# Patient Record
Sex: MALE | Race: WHITE | NOT HISPANIC OR LATINO | Employment: OTHER | URBAN - METROPOLITAN AREA
[De-identification: names, ages, dates, MRNs, and addresses within clinical notes are randomized per-mention and may not be internally consistent; named-entity substitution may affect disease eponyms.]

---

## 2017-02-21 DIAGNOSIS — I10 BENIGN ESSENTIAL HYPERTENSION: ICD-10-CM

## 2017-02-21 RX ORDER — LISINOPRIL AND HYDROCHLOROTHIAZIDE 20; 25 MG/1; MG/1
0.5 TABLET ORAL DAILY
Qty: 15 TABLET | Refills: 0 | Status: SHIPPED | OUTPATIENT
Start: 2017-02-21 | End: 2017-02-23

## 2017-02-21 RX ORDER — SIMVASTATIN 40 MG
20 TABLET ORAL AT BEDTIME
Qty: 15 TABLET | Refills: 0 | Status: SHIPPED | OUTPATIENT
Start: 2017-02-21 | End: 2017-02-23

## 2017-02-21 NOTE — TELEPHONE ENCOUNTER
Lisinopril hctz      Last Written Prescription Date: 1/22/16  Last Fill Quantity: 180, # refills: 0  Last Office Visit with [facility], [facility] or [facility] prescribing provider: 06/28/16       Potassium   Date Value Ref Range Status   01/22/2016 3.6 3.4 - 5.3 mmol/L Final     Creatinine   Date Value Ref Range Status   01/22/2016 0.90 0.66 - 1.25 mg/dL Final     BP Readings from Last 3 Encounters:   06/28/16 132/89   01/22/16 120/80       Simvastatin 40     Last Written Prescription Date: 1/22/16  Last Fill Quantity: 180, # refills: 0  Last Office Visit with [facility], [facility] or [facility] prescribing provider: 06/28/16       Lab Results   Component Value Date    CHOL 150 01/22/2016     Lab Results   Component Value Date    HDL 32 01/22/2016     Lab Results   Component Value Date    LDL 68 01/22/2016     Lab Results   Component Value Date    TRIG 248 01/22/2016     No results found for: CHOLRAMILA      Thank You!  Rowena Aponte  [facility]  P: [phone] F:[phone]

## 2017-02-21 NOTE — TELEPHONE ENCOUNTER
Due for labs and follow up, will refill x 1 and inform pt    Lucas Alvarez, Pharm D  [facility]  [phone]

## 2017-02-22 ENCOUNTER — TELEPHONE (OUTPATIENT)
Dept: FAMILY MEDICINE | Facility: CLINIC | Age: 67
End: 2017-02-22

## 2017-02-22 DIAGNOSIS — E78.5 HYPERLIPIDEMIA LDL GOAL <130: Primary | ICD-10-CM

## 2017-02-22 DIAGNOSIS — I10 BENIGN ESSENTIAL HYPERTENSION: ICD-10-CM

## 2017-02-22 NOTE — TELEPHONE ENCOUNTER
Pt has appointment tomorrow afternoon and he made apt for 8:00 tomorrow morning for fasting labs. He is asking if we can put in his lab orders. His appointment is for follow up on his BP and Cholesterol. He has not had labs since Jan 2016.  (No need to call him back)

## 2017-02-23 ENCOUNTER — OFFICE VISIT (OUTPATIENT)
Dept: FAMILY MEDICINE | Facility: CLINIC | Age: 67
End: 2017-02-23
Payer: MEDICARE

## 2017-02-23 VITALS
SYSTOLIC BLOOD PRESSURE: 127 MMHG | BODY MASS INDEX: 30.29 KG/M2 | HEIGHT: 67 IN | WEIGHT: 193 LBS | TEMPERATURE: 97.7 F | HEART RATE: 86 BPM | DIASTOLIC BLOOD PRESSURE: 87 MMHG

## 2017-02-23 DIAGNOSIS — E78.5 HYPERLIPIDEMIA LDL GOAL <130: ICD-10-CM

## 2017-02-23 DIAGNOSIS — I10 BENIGN ESSENTIAL HYPERTENSION: ICD-10-CM

## 2017-02-23 LAB
ALBUMIN SERPL-MCNC: 3.6 G/DL (ref 3.4–5)
ALP SERPL-CCNC: 68 U/L (ref 40–150)
ALT SERPL W P-5'-P-CCNC: 31 U/L (ref 0–70)
ANION GAP SERPL CALCULATED.3IONS-SCNC: 5 MMOL/L (ref 3–14)
AST SERPL W P-5'-P-CCNC: 19 U/L (ref 0–45)
BASOPHILS # BLD AUTO: 0 10E9/L (ref 0–0.2)
BASOPHILS NFR BLD AUTO: 0.6 %
BILIRUB SERPL-MCNC: 0.4 MG/DL (ref 0.2–1.3)
BUN SERPL-MCNC: 22 MG/DL (ref 7–30)
CALCIUM SERPL-MCNC: 9.4 MG/DL (ref 8.5–10.1)
CHLORIDE SERPL-SCNC: 101 MMOL/L (ref 94–109)
CHOLEST SERPL-MCNC: 152 MG/DL
CO2 SERPL-SCNC: 30 MMOL/L (ref 20–32)
CREAT SERPL-MCNC: 0.86 MG/DL (ref 0.66–1.25)
DIFFERENTIAL METHOD BLD: NORMAL
EOSINOPHIL # BLD AUTO: 0.1 10E9/L (ref 0–0.7)
EOSINOPHIL NFR BLD AUTO: 2.3 %
ERYTHROCYTE [DISTWIDTH] IN BLOOD BY AUTOMATED COUNT: 13.4 % (ref 10–15)
GFR SERPL CREATININE-BSD FRML MDRD: 88 ML/MIN/1.7M2
GLUCOSE SERPL-MCNC: 96 MG/DL (ref 70–99)
HCT VFR BLD AUTO: 42.2 % (ref 40–53)
HDLC SERPL-MCNC: 32 MG/DL
HGB BLD-MCNC: 14.6 G/DL (ref 13.3–17.7)
LDLC SERPL CALC-MCNC: 91 MG/DL
LYMPHOCYTES # BLD AUTO: 2 10E9/L (ref 0.8–5.3)
LYMPHOCYTES NFR BLD AUTO: 37.3 %
MCH RBC QN AUTO: 32.2 PG (ref 26.5–33)
MCHC RBC AUTO-ENTMCNC: 34.6 G/DL (ref 31.5–36.5)
MCV RBC AUTO: 93 FL (ref 78–100)
MONOCYTES # BLD AUTO: 0.5 10E9/L (ref 0–1.3)
MONOCYTES NFR BLD AUTO: 9.4 %
NEUTROPHILS # BLD AUTO: 2.7 10E9/L (ref 1.6–8.3)
NEUTROPHILS NFR BLD AUTO: 50.4 %
NONHDLC SERPL-MCNC: 120 MG/DL
PLATELET # BLD AUTO: 266 10E9/L (ref 150–450)
POTASSIUM SERPL-SCNC: 3.9 MMOL/L (ref 3.4–5.3)
PROT SERPL-MCNC: 7.4 G/DL (ref 6.8–8.8)
RBC # BLD AUTO: 4.54 10E12/L (ref 4.4–5.9)
SODIUM SERPL-SCNC: 136 MMOL/L (ref 133–144)
TRIGL SERPL-MCNC: 146 MG/DL
WBC # BLD AUTO: 5.3 10E9/L (ref 4–11)

## 2017-02-23 PROCEDURE — 36415 COLL VENOUS BLD VENIPUNCTURE: CPT | Performed by: PHYSICIAN ASSISTANT

## 2017-02-23 PROCEDURE — 80061 LIPID PANEL: CPT | Performed by: PHYSICIAN ASSISTANT

## 2017-02-23 PROCEDURE — 99213 OFFICE O/P EST LOW 20 MIN: CPT | Performed by: PHYSICIAN ASSISTANT

## 2017-02-23 PROCEDURE — 85025 COMPLETE CBC W/AUTO DIFF WBC: CPT | Performed by: PHYSICIAN ASSISTANT

## 2017-02-23 PROCEDURE — 80053 COMPREHEN METABOLIC PANEL: CPT | Performed by: PHYSICIAN ASSISTANT

## 2017-02-23 RX ORDER — SIMVASTATIN 40 MG
20 TABLET ORAL AT BEDTIME
Qty: 180 TABLET | Refills: 0 | Status: SHIPPED | OUTPATIENT
Start: 2017-02-23 | End: 2018-03-19

## 2017-02-23 RX ORDER — LISINOPRIL AND HYDROCHLOROTHIAZIDE 20; 25 MG/1; MG/1
0.5 TABLET ORAL DAILY
Qty: 180 TABLET | Refills: 0 | Status: SHIPPED | OUTPATIENT
Start: 2017-02-23 | End: 2018-03-19

## 2017-02-23 ASSESSMENT — ENCOUNTER SYMPTOMS
EYE DISCHARGE: 0
DYSURIA: 0
CHILLS: 0
NAUSEA: 0
EYE REDNESS: 0
HEMOPTYSIS: 0
WEIGHT LOSS: 0
EYE PAIN: 0
MYALGIAS: 0
ABDOMINAL PAIN: 0
DOUBLE VISION: 0
SENSORY CHANGE: 0
SORE THROAT: 0
DIZZINESS: 0
DIAPHORESIS: 0
PSYCHIATRIC NEGATIVE: 1
SPUTUM PRODUCTION: 0
HALLUCINATIONS: 0
CONSTIPATION: 0
DIARRHEA: 0
FREQUENCY: 0
TINGLING: 0
INSOMNIA: 0
HEARTBURN: 0
WEAKNESS: 0
FEVER: 0
NERVOUS/ANXIOUS: 0
LOSS OF CONSCIOUSNESS: 0
NECK PAIN: 0
PHOTOPHOBIA: 0
BACK PAIN: 0
VOMITING: 0
FOCAL WEAKNESS: 0
ORTHOPNEA: 0
SHORTNESS OF BREATH: 0
BLOOD IN STOOL: 0
WHEEZING: 0
SEIZURES: 0
BLURRED VISION: 0
NEUROLOGICAL NEGATIVE: 1
COUGH: 0
HEADACHES: 0
DEPRESSION: 0
PALPITATIONS: 0

## 2017-02-23 ASSESSMENT — LIFESTYLE VARIABLES: SUBSTANCE_ABUSE: 0

## 2017-02-23 NOTE — NURSING NOTE
"Chief Complaint   Patient presents with     Lipids     Hypertension       Initial /87 (BP Location: Right arm, Patient Position: Chair, Cuff Size: Adult Regular)  Pulse 86  Temp 97.7  F (36.5  C) (Tympanic)  Ht 5' 7.25\" (1.708 m)  Wt 193 lb (87.5 kg)  BMI 30 kg/m2 Estimated body mass index is 30 kg/(m^2) as calculated from the following:    Height as of this encounter: 5' 7.25\" (1.708 m).    Weight as of this encounter: 193 lb (87.5 kg).  Medication Reconciliation: complete    Health Maintenance that is potentially due pending provider review:  NONE    Naina ELLIOTT MA        "

## 2017-02-23 NOTE — MR AVS SNAPSHOT
"              After Visit Summary   2017    Duane Johnson    MRN: [id number]           Patient Information     Date Of Birth          1950        Visit Information        Provider Department      2017 1:40 PM Ronald Riley PA-C [facility]        Today's Diagnoses     Benign essential hypertension           Follow-ups after your visit        Follow-up notes from your care team     Return if symptoms worsen or fail to improve.      Who to contact     If you have questions or need follow up information about today's clinic visit or your schedule please contact [facility] directly at [phone].  Normal or non-critical lab and imaging results will be communicated to you by [employer], letter or phone within 4 business days after the clinic has received the results. If you do not hear from us within 7 days, please contact the clinic through [employer] or phone. If you have a critical or abnormal lab result, we will notify you by phone as soon as possible.  Submit refill requests through [facility] or call your pharmacy and they will forward the refill request to us. Please allow 3 business days for your refill to be completed.          Additional Information About Your Visit        MyChart Information     [facility] lets you send messages to your doctor, view your test results, renew your prescriptions, schedule appointments and more. To sign up, go to [email or web address] . Click on \"Log in\" on the left side of the screen, which will take you to the Welcome page. Then click on \"Sign up Now\" on the right side of the page.     You will be asked to enter the access code listed below, as well as some personal information. Please follow the directions to create your username and password.     Your access code is: [email or web address]  Expires: 2017  2:30 PM     Your access code will  in 90 days. If you need help or a new code, please call your [facility] or " "[phone].        Care EveryWhere ID     This is your Care EveryWhere ID. This could be used by other organizations to access your [facility] medical records  [id number]        Your Vitals Were     Pulse Temperature Height BMI (Body Mass Index)          86 97.7  F (36.5  C) (Tympanic) 5' 7.25\" (1.708 m) 30 kg/m2         Blood Pressure from Last 3 Encounters:   02/23/17 127/87   06/28/16 132/89   01/22/16 120/80    Weight from Last 3 Encounters:   02/23/17 193 lb (87.5 kg)   06/28/16 201 lb (91.2 kg)   01/22/16 211 lb (95.7 kg)              Today, you had the following     No orders found for display         Where to get your medicines      These medications were sent to [facility] [address] [address]     Phone:  [phone]     lisinopril-hydrochlorothiazide 20-25 MG per tablet    simvastatin 40 MG tablet          Primary Care Provider    None Specified       No primary provider on file.        Thank you!     Thank you for choosing [facility]  for your care. Our goal is always to provide you with excellent care. Hearing back from our patients is one way we can continue to improve our services. Please take a few minutes to complete the written survey that you may receive in the mail after your visit with us. Thank you!             Your Updated Medication List - Protect others around you: Learn how to safely use, store and throw away your medicines at www.disposemymeds.org.          This list is accurate as of: 2/23/17  2:30 PM.  Always use your most recent med list.                   Brand Name Dispense Instructions for use    lisinopril-hydrochlorothiazide 20-25 MG per tablet    PRINZIDE/ZESTORETIC    180 tablet    Take 0.5 tablets by mouth daily       simvastatin 40 MG tablet    ZOCOR    180 tablet    Take 0.5 tablets (20 mg) by mouth At Bedtime         "

## 2017-02-23 NOTE — PROGRESS NOTES
HPI    SUBJECTIVE:                                                    Duane Johnson is a 67 year old male who presents to clinic today for medication follow-up. He is taking mediation as scheduled with no side effects. He reports home blood pressures have been in normal range although he cant remember exact numbers. He endorses healthy diet and currently takes fish oils twice daily.       Hyperlipidemia Follow-Up      Rate your low fat/cholesterol diet?: good    Taking statin?  Yes, no muscle aches from statin    Other lipid medications/supplements?:  none     Hypertension Follow-up      Outpatient blood pressures are being checked at home.  Results are, can't remember them.    Low Salt Diet: no added salt       Amount of exercise or physical activity: 2-3 days/week for an average of 15-30 minutes    Problems taking medications regularly: No    Medication side effects: none  Diet: regular (no restrictions)      Problem list and histories reviewed & adjusted, as indicated.  Additional history: as documented    Patient Active Problem List   Diagnosis     Hyperlipidemia LDL goal <130     Benign essential hypertension     History reviewed. No pertinent past surgical history.    Social History   Substance Use Topics     Smoking status: Current Every Day Smoker     Packs/day: 0.25     Types: Cigarettes     Smokeless tobacco: Not on file     Alcohol use Yes     Family History   Problem Relation Age of Onset     Other Cancer Mother      Other Cancer Sister          Current Outpatient Prescriptions   Medication Sig Dispense Refill     lisinopril-hydrochlorothiazide (PRINZIDE/ZESTORETIC) 20-25 MG per tablet Take 0.5 tablets by mouth daily 180 tablet 0     simvastatin (ZOCOR) 40 MG tablet Take 0.5 tablets (20 mg) by mouth At Bedtime 180 tablet 0     [DISCONTINUED] lisinopril-hydrochlorothiazide (PRINZIDE/ZESTORETIC) 20-25 MG per tablet Take 0.5 tablets by mouth daily 15 tablet 0     [DISCONTINUED] simvastatin (ZOCOR) 40 MG  tablet Take 0.5 tablets (20 mg) by mouth At Bedtime 15 tablet 0     No Known Allergies  BP Readings from Last 3 Encounters:   02/23/17 127/87   06/28/16 132/89   01/22/16 120/80    Wt Readings from Last 3 Encounters:   02/23/17 193 lb (87.5 kg)   06/28/16 201 lb (91.2 kg)   01/22/16 211 lb (95.7 kg)            Labs reviewed in EPIC  Problem list, Medication list, Allergies, and Medical/Social/Surgical histories reviewed in [facility] and updated as appropriate.      Review of Systems   Constitutional: Negative for chills, diaphoresis, fever, malaise/fatigue and weight loss.   HENT: Negative for congestion, ear discharge, ear pain, hearing loss, nosebleeds, sore throat and tinnitus.    Eyes: Negative for blurred vision, double vision, photophobia, pain, discharge and redness.   Respiratory: Negative for cough, hemoptysis, sputum production, shortness of breath and wheezing.    Cardiovascular: Negative for chest pain, palpitations, orthopnea and leg swelling.   Gastrointestinal: Negative for abdominal pain, blood in stool, constipation, diarrhea, heartburn, melena, nausea and vomiting.   Genitourinary: Negative.  Negative for dysuria, frequency and urgency.   Musculoskeletal: Negative for back pain, joint pain, myalgias and neck pain.   Skin: Negative for itching and rash.   Neurological: Negative.  Negative for dizziness, tingling, sensory change, focal weakness, seizures, loss of consciousness, weakness and headaches.   Endo/Heme/Allergies: Negative.    Psychiatric/Behavioral: Negative.  Negative for depression, hallucinations, substance abuse and suicidal ideas. The patient is not nervous/anxious and does not have insomnia.        Physical Exam   Constitutional: He is oriented to person, place, and time and well-developed, well-nourished, and in no distress.   HENT:   Head: Normocephalic and atraumatic.   Right Ear: External ear normal.   Left Ear: External ear normal.   Nose: Nose normal.   Mouth/Throat: Oropharynx is  clear and moist.   Eyes: Conjunctivae and EOM are normal. Pupils are equal, round, and reactive to light. Right eye exhibits no discharge. Left eye exhibits no discharge. No scleral icterus.   Neck: Normal range of motion. Neck supple. No thyromegaly present.   Cardiovascular: Normal rate, regular rhythm, normal heart sounds and intact distal pulses.  Exam reveals no gallop and no friction rub.    No murmur heard.  Pulmonary/Chest: Effort normal and breath sounds normal. No respiratory distress. He has no wheezes. He has no rales. He exhibits no tenderness.   Abdominal: Soft. Bowel sounds are normal. He exhibits no distension and no mass. There is no tenderness. There is no rebound and no guarding.   Musculoskeletal: Normal range of motion. He exhibits no edema or tenderness.   Lymphadenopathy:     He has no cervical adenopathy.   Neurological: He is alert and oriented to person, place, and time. He has normal reflexes. No cranial nerve deficit. He exhibits normal muscle tone. Gait normal. Coordination normal.   Skin: Skin is warm and dry. No rash noted. No erythema.   Psychiatric: Mood, memory, affect and judgment normal.     (I10) Benign essential hypertension  Comment:   Plan: lisinopril-hydrochlorothiazide         (PRINZIDE/ZESTORETIC) 20-25 MG per tablet,         simvastatin (ZOCOR) 40 MG tablet         Labs within normal limits, continue taking medication as prescribed. Discussed exercise and heathly diet to increase HDL levels. Call or return to clinic with any questions or concerns.       Abbie Epley, PA-S     I have examined this patient and reviewed above note  Ronald Riley MS, PARUPAL

## 2018-03-19 DIAGNOSIS — I10 BENIGN ESSENTIAL HYPERTENSION: ICD-10-CM

## 2018-03-20 ENCOUNTER — OFFICE VISIT (OUTPATIENT)
Dept: FAMILY MEDICINE | Facility: CLINIC | Age: 68
End: 2018-03-20
Payer: MEDICARE

## 2018-03-20 VITALS
BODY MASS INDEX: 30.45 KG/M2 | SYSTOLIC BLOOD PRESSURE: 120 MMHG | HEART RATE: 62 BPM | WEIGHT: 194 LBS | HEIGHT: 67 IN | DIASTOLIC BLOOD PRESSURE: 82 MMHG

## 2018-03-20 DIAGNOSIS — I10 BENIGN ESSENTIAL HYPERTENSION: ICD-10-CM

## 2018-03-20 DIAGNOSIS — E78.5 HYPERLIPIDEMIA LDL GOAL <130: Primary | ICD-10-CM

## 2018-03-20 LAB
ALBUMIN SERPL-MCNC: 3.7 G/DL (ref 3.4–5)
ALP SERPL-CCNC: 82 U/L (ref 40–150)
ALT SERPL W P-5'-P-CCNC: 22 U/L (ref 0–70)
ANION GAP SERPL CALCULATED.3IONS-SCNC: 5 MMOL/L (ref 3–14)
AST SERPL W P-5'-P-CCNC: 20 U/L (ref 0–45)
BILIRUB SERPL-MCNC: 0.5 MG/DL (ref 0.2–1.3)
BUN SERPL-MCNC: 13 MG/DL (ref 7–30)
CALCIUM SERPL-MCNC: 9.1 MG/DL (ref 8.5–10.1)
CHLORIDE SERPL-SCNC: 102 MMOL/L (ref 94–109)
CHOLEST SERPL-MCNC: 126 MG/DL
CO2 SERPL-SCNC: 30 MMOL/L (ref 20–32)
CREAT SERPL-MCNC: 0.89 MG/DL (ref 0.66–1.25)
ERYTHROCYTE [DISTWIDTH] IN BLOOD BY AUTOMATED COUNT: 12.9 % (ref 10–15)
GFR SERPL CREATININE-BSD FRML MDRD: 85 ML/MIN/1.7M2
GLUCOSE SERPL-MCNC: 91 MG/DL (ref 70–99)
HCT VFR BLD AUTO: 40.1 % (ref 40–53)
HDLC SERPL-MCNC: 33 MG/DL
HGB BLD-MCNC: 13.9 G/DL (ref 13.3–17.7)
LDLC SERPL CALC-MCNC: 64 MG/DL
MCH RBC QN AUTO: 32 PG (ref 26.5–33)
MCHC RBC AUTO-ENTMCNC: 34.7 G/DL (ref 31.5–36.5)
MCV RBC AUTO: 92 FL (ref 78–100)
NONHDLC SERPL-MCNC: 93 MG/DL
PLATELET # BLD AUTO: 261 10E9/L (ref 150–450)
POTASSIUM SERPL-SCNC: 3.7 MMOL/L (ref 3.4–5.3)
PROT SERPL-MCNC: 7.2 G/DL (ref 6.8–8.8)
RBC # BLD AUTO: 4.34 10E12/L (ref 4.4–5.9)
SODIUM SERPL-SCNC: 137 MMOL/L (ref 133–144)
TRIGL SERPL-MCNC: 147 MG/DL
WBC # BLD AUTO: 6.3 10E9/L (ref 4–11)

## 2018-03-20 PROCEDURE — 85027 COMPLETE CBC AUTOMATED: CPT | Performed by: PHYSICIAN ASSISTANT

## 2018-03-20 PROCEDURE — 80053 COMPREHEN METABOLIC PANEL: CPT | Performed by: PHYSICIAN ASSISTANT

## 2018-03-20 PROCEDURE — 36415 COLL VENOUS BLD VENIPUNCTURE: CPT | Performed by: PHYSICIAN ASSISTANT

## 2018-03-20 PROCEDURE — 99214 OFFICE O/P EST MOD 30 MIN: CPT | Performed by: PHYSICIAN ASSISTANT

## 2018-03-20 PROCEDURE — 80061 LIPID PANEL: CPT | Performed by: PHYSICIAN ASSISTANT

## 2018-03-20 RX ORDER — LISINOPRIL AND HYDROCHLOROTHIAZIDE 20; 25 MG/1; MG/1
TABLET ORAL
Qty: 15 TABLET | Refills: 0 | Status: SHIPPED | OUTPATIENT
Start: 2018-03-20 | End: 2018-03-20

## 2018-03-20 RX ORDER — LISINOPRIL AND HYDROCHLOROTHIAZIDE 20; 25 MG/1; MG/1
0.5 TABLET ORAL DAILY
Qty: 183 TABLET | Refills: 0 | Status: SHIPPED | OUTPATIENT
Start: 2018-03-20 | End: 2019-04-22

## 2018-03-20 RX ORDER — SIMVASTATIN 40 MG
TABLET ORAL
Qty: 15 TABLET | Refills: 0 | Status: SHIPPED | OUTPATIENT
Start: 2018-03-20 | End: 2018-03-20

## 2018-03-20 RX ORDER — SIMVASTATIN 40 MG
TABLET ORAL
Qty: 189 TABLET | Refills: 0 | Status: SHIPPED | OUTPATIENT
Start: 2018-03-20 | End: 2019-04-22

## 2018-03-20 ASSESSMENT — ENCOUNTER SYMPTOMS
EYE PAIN: 0
MYALGIAS: 0
INSOMNIA: 0
ORTHOPNEA: 0
HEMOPTYSIS: 0
NAUSEA: 0
BLURRED VISION: 0
WEAKNESS: 0
CONSTIPATION: 0
VOMITING: 0
FEVER: 0
SEIZURES: 0
DIZZINESS: 0
WEIGHT LOSS: 0
DYSURIA: 0
PHOTOPHOBIA: 0
FOCAL WEAKNESS: 0
DEPRESSION: 0
HALLUCINATIONS: 0
COUGH: 0
DOUBLE VISION: 0
DIARRHEA: 0
HEADACHES: 0
SPUTUM PRODUCTION: 0
EYE DISCHARGE: 0
NEUROLOGICAL NEGATIVE: 1
NECK PAIN: 0
FREQUENCY: 0
SENSORY CHANGE: 0
PALPITATIONS: 0
BACK PAIN: 0
DIAPHORESIS: 0
TINGLING: 0
ABDOMINAL PAIN: 0
HEARTBURN: 0
LOSS OF CONSCIOUSNESS: 0
BLOOD IN STOOL: 0
NERVOUS/ANXIOUS: 0
SORE THROAT: 0
SHORTNESS OF BREATH: 0
WHEEZING: 0
EYE REDNESS: 0

## 2018-03-20 ASSESSMENT — LIFESTYLE VARIABLES: SUBSTANCE_ABUSE: 0

## 2018-03-20 ASSESSMENT — PAIN SCALES - GENERAL: PAINLEVEL: NO PAIN (0)

## 2018-03-20 NOTE — PROGRESS NOTES
HPI      SUBJECTIVE:   Duane Johnson is a 68 year old male who presents to clinic today for follow-up of his hypertension and hyperlipidemia.  He is doing very well his current medication and having no side effects or problems.      Hyperlipidemia Follow-Up      Rate your low fat/cholesterol diet?: not monitoring fat    Taking statin?  Yes, no muscle aches from statin    Other lipid medications/supplements?:  none    Hypertension Follow-up      Outpatient blood pressures are not being checked.    Low Salt Diet: no added salt      Amount of exercise or physical activity: 4-5 days/week for an average of 30-45 minutes    Problems taking medications regularly: No    Medication side effects: none    Diet: regular (no restrictions)            Problem list and histories reviewed & adjusted, as indicated.  Additional history: as documented    Patient Active Problem List   Diagnosis     Hyperlipidemia LDL goal <130     Benign essential hypertension     History reviewed. No pertinent surgical history.    Social History   Substance Use Topics     Smoking status: Current Every Day Smoker     Packs/day: 0.25     Types: Cigarettes     Smokeless tobacco: Never Used     Alcohol use Yes     Family History   Problem Relation Age of Onset     Other Cancer Mother      Other Cancer Sister          Current Outpatient Prescriptions   Medication Sig Dispense Refill     lisinopril-hydrochlorothiazide (PRINZIDE/ZESTORETIC) 20-25 MG per tablet Take 0.5 tablets by mouth daily 183 tablet 0     simvastatin (ZOCOR) 40 MG tablet TAKE ONE-HALF TABLET BY MOUTH EVERY NIGHT AT BEDTIME 189 tablet 0     [DISCONTINUED] lisinopril-hydrochlorothiazide (PRINZIDE/ZESTORETIC) 20-25 MG per tablet TAKE ONE-HALF TABLET BY MOUTH EVERY DAY 15 tablet 0     [DISCONTINUED] simvastatin (ZOCOR) 40 MG tablet TAKE ONE-HALF TABLET BY MOUTH EVERY NIGHT AT BEDTIME 15 tablet 0     No Known Allergies  Labs reviewed in EPIC    Reviewed and updated as needed this visit by  clinical staff       Reviewed and updated as needed this visit by Provider             Review of Systems   Constitutional: Negative for diaphoresis, fever, malaise/fatigue and weight loss.   HENT: Negative for congestion, ear discharge, ear pain, hearing loss, nosebleeds and sore throat.    Eyes: Negative for blurred vision, double vision, photophobia, pain, discharge and redness.   Respiratory: Negative for cough, hemoptysis, sputum production, shortness of breath and wheezing.    Cardiovascular: Negative for chest pain, palpitations, orthopnea and leg swelling.   Gastrointestinal: Negative for abdominal pain, blood in stool, constipation, diarrhea, heartburn, melena, nausea and vomiting.   Genitourinary: Negative.  Negative for dysuria, frequency and urgency.   Musculoskeletal: Negative for back pain, joint pain, myalgias and neck pain.   Skin: Negative for itching and rash.   Neurological: Negative.  Negative for dizziness, tingling, sensory change, focal weakness, seizures, loss of consciousness, weakness and headaches.   Endo/Heme/Allergies: Negative.    Psychiatric/Behavioral: Negative for depression, hallucinations, substance abuse and suicidal ideas. The patient is not nervous/anxious and does not have insomnia.          Physical Exam   Constitutional: He is oriented to person, place, and time and well-developed, well-nourished, and in no distress.   HENT:   Head: Normocephalic and atraumatic.   Right Ear: External ear normal.   Left Ear: External ear normal.   Nose: Nose normal.   Mouth/Throat: Oropharynx is clear and moist.   Eyes: Conjunctivae and EOM are normal. Pupils are equal, round, and reactive to light. Right eye exhibits no discharge. Left eye exhibits no discharge. No scleral icterus.   Neck: Normal range of motion. Neck supple. No thyromegaly present.   Cardiovascular: Normal rate, regular rhythm, normal heart sounds and intact distal pulses.  Exam reveals no gallop and no friction rub.    No  murmur heard.  Pulmonary/Chest: Effort normal and breath sounds normal. No respiratory distress. He has no wheezes. He has no rales. He exhibits no tenderness.   Abdominal: Soft. Bowel sounds are normal. He exhibits no distension and no mass. There is no tenderness. There is no rebound and no guarding.   Musculoskeletal: Normal range of motion. He exhibits no edema or tenderness.   Lymphadenopathy:     He has no cervical adenopathy.   Neurological: He is alert and oriented to person, place, and time. He has normal reflexes. No cranial nerve deficit. He exhibits normal muscle tone. Gait normal. Coordination normal.   Skin: Skin is warm and dry. No rash noted. No erythema.   Psychiatric: Mood, memory, affect and judgment normal.       .(E78.5) Hyperlipidemia LDL goal <130  (primary encounter diagnosis)  Comment:   Plan: Comprehensive metabolic panel (BMP + Alb, Alk         Phos, ALT, AST, Total. Bili, TP), Lipid panel         reflex to direct LDL Fasting, CBC with         platelets            (I10) Benign essential hypertension  Comment:   Plan: lisinopril-hydrochlorothiazide         (PRINZIDE/ZESTORETIC) 20-25 MG per tablet,         simvastatin (ZOCOR) 40 MG tablet, Comprehensive        metabolic panel (BMP + Alb, Alk Phos, ALT, AST,        Total. Bili, TP), Lipid panel reflex to direct         LDL Fasting, CBC with platelets             Labs were ordered and we will contact her with these results  Meds were refilled

## 2018-03-20 NOTE — MR AVS SNAPSHOT
"              After Visit Summary   3/20/2018    Duane Johnson    MRN: [id number]           Patient Information     Date Of Birth          1950        Visit Information        Provider Department      3/20/2018 11:20 AM Ronald Riley PA-C [facility]        Today's Diagnoses     Hyperlipidemia LDL goal <130    -  1    Benign essential hypertension           Follow-ups after your visit        Follow-up notes from your care team     Return if symptoms worsen or fail to improve.      Who to contact     If you have questions or need follow up information about today's clinic visit or your schedule please contact [facility] directly at [phone].  Normal or non-critical lab and imaging results will be communicated to you by [employer], letter or phone within 4 business days after the clinic has received the results. If you do not hear from us within 7 days, please contact the clinic through [employer] or phone. If you have a critical or abnormal lab result, we will notify you by phone as soon as possible.  Submit refill requests through [employer] or call your pharmacy and they will forward the refill request to us. Please allow 3 business days for your refill to be completed.          Additional Information About Your Visit        MyChart Information     [facility] [employer] lets you send messages to your doctor, view your test results, renew your prescriptions, schedule appointments and more. To sign up, go to [email or web address] . Click on \"Log in\" on the left side of the screen, which will take you to the Welcome page. Then click on \"Sign up Now\" on the right side of the page.     You will be asked to enter the access code listed below, as well as some personal information. Please follow the directions to create your username and password.     Your access code is: [email or web address]  Expires: 2018 12:34 PM     Your access code will  in 90 days. If you need help or a new code, please " "call your [facility] clinic or [phone].        Care EveryWhere ID     This is your Care EveryWhere ID. This could be used by other organizations to access your [facility] medical records  [id number]        Your Vitals Were     Pulse Height BMI (Body Mass Index)             62 5' 7.25\" (1.708 m) 30.16 kg/m2          Blood Pressure from Last 3 Encounters:   03/20/18 120/82   02/23/17 127/87   06/28/16 132/89    Weight from Last 3 Encounters:   03/20/18 194 lb (88 kg)   02/23/17 193 lb (87.5 kg)   06/28/16 201 lb (91.2 kg)              We Performed the Following     CBC with platelets     Comprehensive metabolic panel (BMP + Alb, Alk Phos, ALT, AST, Total. Bili, TP)     Lipid panel reflex to direct LDL Fasting          Today's Medication Changes          These changes are accurate as of 3/20/18 12:34 PM.  If you have any questions, ask your nurse or doctor.               These medicines have changed or have updated prescriptions.        Dose/Directions    lisinopril-hydrochlorothiazide 20-25 MG per tablet   Commonly known as:  PRINZIDE/ZESTORETIC   This may have changed:  See the new instructions.   Used for:  Benign essential hypertension   Changed by:  Ronald Riley PA-C        Dose:  0.5 tablet   Take 0.5 tablets by mouth daily   Quantity:  183 tablet   Refills:  0       simvastatin 40 MG tablet   Commonly known as:  ZOCOR   This may have changed:  See the new instructions.   Used for:  Benign essential hypertension   Changed by:  Ronald Riley PA-C        TAKE ONE-HALF TABLET BY MOUTH EVERY NIGHT AT BEDTIME   Quantity:  189 tablet   Refills:  0            Where to get your medicines      These medications were sent to [facility] [place] [id number] [place] [address]     Phone:  [phone]     lisinopril-hydrochlorothiazide 20-25 MG per tablet    simvastatin 40 MG tablet                Primary Care Provider Office Phone # Fax #    Ronald Riley, " NAZIA [phone]       [address] [address]        Equal Access to Services     MARIAN ROMAN : Nara elicia myers leobardo Islas, warosibelda stuart, adam kajhonnyda kelley, doreen tillman. So [facility] [phone].    ATENCIÓN: Si habla español, tiene a lopez disposición servicios gratuitos de asistencia lingüística. Llame al [phone].    We comply with applicable federal civil rights laws and [place] laws. We do not discriminate on the basis of race, color, national origin, age, disability, sex, sexual orientation, or gender identity.            Thank you!     Thank you for choosing [facility]  for your care. Our goal is always to provide you with excellent care. Hearing back from our patients is one way we can continue to improve our services. Please take a few minutes to complete the written survey that you may receive in the mail after your visit with us. Thank you!             Your Updated Medication List - Protect others around you: Learn how to safely use, store and throw away your medicines at www.disposemymeds.org.          This list is accurate as of 3/20/18 12:34 PM.  Always use your most recent med list.                   Brand Name Dispense Instructions for use Diagnosis    lisinopril-hydrochlorothiazide 20-25 MG per tablet    PRINZIDE/ZESTORETIC    183 tablet    Take 0.5 tablets by mouth daily    Benign essential hypertension       simvastatin 40 MG tablet    ZOCOR    189 tablet    TAKE ONE-HALF TABLET BY MOUTH EVERY NIGHT AT BEDTIME    Benign essential hypertension

## 2018-03-20 NOTE — NURSING NOTE
"Chief Complaint   Patient presents with     Hypertension     Lipids       Initial BP (!) 146/95 (BP Location: Right arm, Patient Position: Chair, Cuff Size: Adult Large)  Pulse 62  Ht 5' 7.25\" (1.708 m)  Wt 194 lb (88 kg)  BMI 30.16 kg/m2 Estimated body mass index is 30.16 kg/(m^2) as calculated from the following:    Height as of this encounter: 5' 7.25\" (1.708 m).    Weight as of this encounter: 194 lb (88 kg).      Health Maintenance that is potentially due pending provider review:  NONE        Is there anyone who you would like to be able to receive your results? No  If yes have patient fill out ANT      "

## 2019-04-22 ENCOUNTER — OFFICE VISIT (OUTPATIENT)
Dept: FAMILY MEDICINE | Facility: CLINIC | Age: 69
End: 2019-04-22
Payer: MEDICARE

## 2019-04-22 VITALS
TEMPERATURE: 97.7 F | SYSTOLIC BLOOD PRESSURE: 130 MMHG | HEART RATE: 68 BPM | DIASTOLIC BLOOD PRESSURE: 82 MMHG | BODY MASS INDEX: 29.32 KG/M2 | RESPIRATION RATE: 18 BRPM | WEIGHT: 186.8 LBS | HEIGHT: 67 IN

## 2019-04-22 DIAGNOSIS — E78.5 HYPERLIPIDEMIA LDL GOAL <130: ICD-10-CM

## 2019-04-22 DIAGNOSIS — I10 BENIGN ESSENTIAL HYPERTENSION: ICD-10-CM

## 2019-04-22 DIAGNOSIS — Z00.00 MEDICARE ANNUAL WELLNESS VISIT, SUBSEQUENT: Primary | ICD-10-CM

## 2019-04-22 DIAGNOSIS — Z12.11 SPECIAL SCREENING FOR MALIGNANT NEOPLASMS, COLON: ICD-10-CM

## 2019-04-22 LAB
ANION GAP SERPL CALCULATED.3IONS-SCNC: 4 MMOL/L (ref 3–14)
BUN SERPL-MCNC: 11 MG/DL (ref 7–30)
CALCIUM SERPL-MCNC: 9.5 MG/DL (ref 8.5–10.1)
CHLORIDE SERPL-SCNC: 100 MMOL/L (ref 94–109)
CHOLEST SERPL-MCNC: 148 MG/DL
CO2 SERPL-SCNC: 31 MMOL/L (ref 20–32)
CREAT SERPL-MCNC: 0.88 MG/DL (ref 0.66–1.25)
GFR SERPL CREATININE-BSD FRML MDRD: 87 ML/MIN/{1.73_M2}
GLUCOSE SERPL-MCNC: 92 MG/DL (ref 70–99)
HDLC SERPL-MCNC: 34 MG/DL
LDLC SERPL CALC-MCNC: 84 MG/DL
NONHDLC SERPL-MCNC: 114 MG/DL
POTASSIUM SERPL-SCNC: 4 MMOL/L (ref 3.4–5.3)
SODIUM SERPL-SCNC: 135 MMOL/L (ref 133–144)
TRIGL SERPL-MCNC: 151 MG/DL

## 2019-04-22 PROCEDURE — 80061 LIPID PANEL: CPT | Performed by: FAMILY MEDICINE

## 2019-04-22 PROCEDURE — 80048 BASIC METABOLIC PNL TOTAL CA: CPT | Performed by: FAMILY MEDICINE

## 2019-04-22 PROCEDURE — 36415 COLL VENOUS BLD VENIPUNCTURE: CPT | Performed by: FAMILY MEDICINE

## 2019-04-22 PROCEDURE — G0438 PPPS, INITIAL VISIT: HCPCS | Performed by: FAMILY MEDICINE

## 2019-04-22 RX ORDER — ASPIRIN 325 MG
325 TABLET, DELAYED RELEASE (ENTERIC COATED) ORAL 2 TIMES DAILY
Qty: 90 TABLET | Refills: 0 | COMMUNITY
Start: 2019-04-22 | End: 2023-03-13

## 2019-04-22 RX ORDER — SIMVASTATIN 40 MG
TABLET ORAL
Qty: 188 TABLET | Refills: 0 | Status: SHIPPED | OUTPATIENT
Start: 2019-04-22 | End: 2020-05-11

## 2019-04-22 RX ORDER — LISINOPRIL AND HYDROCHLOROTHIAZIDE 20; 25 MG/1; MG/1
0.5 TABLET ORAL DAILY
Qty: 188 TABLET | Refills: 0 | Status: SHIPPED | OUTPATIENT
Start: 2019-04-22 | End: 2020-05-11

## 2019-04-22 RX ORDER — SIMVASTATIN 40 MG
TABLET ORAL
Qty: 189 TABLET | Refills: 0 | Status: SHIPPED | OUTPATIENT
Start: 2019-04-22 | End: 2019-04-22

## 2019-04-22 RX ORDER — LISINOPRIL AND HYDROCHLOROTHIAZIDE 20; 25 MG/1; MG/1
0.5 TABLET ORAL DAILY
Qty: 183 TABLET | Refills: 0 | Status: SHIPPED | OUTPATIENT
Start: 2019-04-22 | End: 2019-04-22

## 2019-04-22 ASSESSMENT — ENCOUNTER SYMPTOMS
PARESTHESIAS: 0
HEMATURIA: 0
DIZZINESS: 0
MYALGIAS: 0
WEAKNESS: 0
JOINT SWELLING: 0
DYSURIA: 0
DIARRHEA: 0
CHILLS: 0
HEADACHES: 0
SHORTNESS OF BREATH: 0
FREQUENCY: 0
NAUSEA: 0
COUGH: 0
PALPITATIONS: 0
CONSTIPATION: 0
HEARTBURN: 0
HEMATOCHEZIA: 0
SORE THROAT: 0
ABDOMINAL PAIN: 0
FEVER: 0
EYE PAIN: 0
ARTHRALGIAS: 1
NERVOUS/ANXIOUS: 0

## 2019-04-22 ASSESSMENT — MIFFLIN-ST. JEOR: SCORE: 1570.95

## 2019-04-22 ASSESSMENT — ACTIVITIES OF DAILY LIVING (ADL): CURRENT_FUNCTION: NO ASSISTANCE NEEDED

## 2019-04-22 NOTE — PROGRESS NOTES
"SUBJECTIVE:   Duane Johnson is a 69 year old male who presents for Preventive Visit.  Are you in the first 12 months of your Medicare coverage?  No    Healthy Habits:     In general, how would you rate your overall health?  Excellent    Frequency of exercise:  2-3 days/week    Duration of exercise:  45-60 minutes    Do you usually eat at least 4 servings of fruit and vegetables a day, include whole grains    & fiber and avoid regularly eating high fat or \"junk\" foods?  No    Taking medications regularly:  Yes    Medication side effects:  None    Ability to successfully perform activities of daily living:  No assistance needed    Home Safety:  Lack of grab bars in the bathroom    Hearing Impairment:  No hearing concerns    In the past 6 months, have you been bothered by leaking of urine?  No    In general, how would you rate your overall mental or emotional health?  Excellent      PHQ-2 Total Score: 0    Do you feel safe in your environment? Yes    Do you have a Health Care Directive? Yes: Patient states has Advance Directive and will bring in a copy to clinic.      Fall risk - no falls       Cognitive Screening   1) Repeat 3 items (Leader, Season, Table)    2) Clock draw: NORMAL  3) 3 item recall: Recalls 2 objects   Results: NORMAL clock, 1-2 items recalled: COGNITIVE IMPAIRMENT LESS LIKELY    Mini-CogTM Copyright S Kathy. Licensed by the author for use in [facility]; reprinted with permission (sugey@.[email or web address]). All rights reserved.      Do you have sleep apnea, excessive snoring or daytime drowsiness?: no    Reviewed and updated as needed this visit by clinical staff         Reviewed and updated as needed this visit by Provider        Social History     Tobacco Use     Smoking status: Current Every Day Smoker     Packs/day: 0.25     Types: Cigarettes     Smokeless tobacco: Never Used   Substance Use Topics     Alcohol use: Yes       Alcohol Use 4/22/2019   Prescreen: >3 drinks/day or >7 drinks/week? No "   Prescreen: >3 drinks/day or >7 drinks/week? -   No flowsheet data found.      Htn: stable on meds  Hyperlipidemia: statin    Takes ASA daily.      Due for colonoscopy, willing to get.        Current providers sharing in care for this patient include:   Patient Care Team:  Jose Coles MD as PCP - General (Family Practice)  Susie Geller APRN CNP as Assigned PCP    The following health maintenance items are reviewed in Epic and correct as of today:  Health Maintenance   Topic Date Due     HEPATITIS C SCREENING  01/26/1968     DTAP/TDAP/TD IMMUNIZATION (1 - Tdap) 01/26/1975     ZOSTER IMMUNIZATION (1 of 2) 01/26/2000     ADVANCE DIRECTIVE PLANNING Q5 YRS  01/26/2005     AORTIC ANEURYSM SCREENING (SYSTEM ASSIGNED)  01/26/2015     MEDICARE ANNUAL WELLNESS VISIT  01/22/2017     FALL RISK ASSESSMENT  06/28/2017     COLON CANCER SCREEN (SYSTEM ASSIGNED)  08/19/2018     INFLUENZA VACCINE (1) 09/01/2018     PHQ-2  01/01/2019     LIPID SCREEN Q5 YR MALE (SYSTEM ASSIGNED)  03/20/2023     IPV IMMUNIZATION  Aged Out     MENINGITIS IMMUNIZATION  Aged Out         Review of Systems   Constitutional: Negative for chills and fever.   HENT: Negative for congestion, ear pain, hearing loss and sore throat.    Eyes: Negative for pain and visual disturbance.   Respiratory: Negative for cough and shortness of breath.    Cardiovascular: Negative for chest pain, palpitations and peripheral edema.   Gastrointestinal: Negative for abdominal pain, constipation, diarrhea, heartburn, hematochezia and nausea.   Genitourinary: Negative for discharge, dysuria, frequency, genital sores, hematuria, impotence and urgency.   Musculoskeletal: Positive for arthralgias. Negative for joint swelling and myalgias.   Skin: Positive for rash.   Neurological: Negative for dizziness, weakness, headaches and paresthesias.   Psychiatric/Behavioral: Negative for mood changes. The patient is not nervous/anxious.          OBJECTIVE:   /82 (BP  "Location: Right arm, Patient Position: Chair, Cuff Size: Adult Regular)   Pulse 68   Temp 97.7  F (36.5  C) (Tympanic)   Resp 18   Ht 1.702 m (5' 7\")   Wt 84.7 kg (186 lb 12.8 oz)   BMI 29.26 kg/m   Estimated body mass index is 30.16 kg/m  as calculated from the following:    Height as of 3/20/18: 1.708 m (5' 7.25\").    Weight as of 3/20/18: 88 kg (194 lb).  Physical Exam  Gen: alert and oriented, in no acute distress, affect within normal limits  Neck: supple with no masses or nodes  Throat: oropharynx clear, no exudate or tonsillar/palate asymmetry.    CV: RRR, no murmur  Lungs: clear bilaterally with good effort  Abd: nontender, no mass  Ext: no edema or lesions   Neuro: moving all extremities, gait normal, no focal deficts noted    ASSESSMENT / PLAN:   Wellness visit  Htn, stable  Hyperlipidemia, stable    Fills.  Labs.  Colonoscopy.  Doing well.       End of Life Planning:  Patient currently has an advanced directive: not addressed     COUNSELING:  Reviewed preventive health counseling, as reflected in patient instructions       Regular exercise       Healthy diet/nutrition    BP Readings from Last 1 Encounters:   03/20/18 120/82     Estimated body mass index is 30.16 kg/m  as calculated from the following:    Height as of 3/20/18: 1.708 m (5' 7.25\").    Weight as of 3/20/18: 88 kg (194 lb).           reports that he has been smoking cigarettes.  He has been smoking about 0.25 packs per day. He has never used smokeless tobacco.      Appropriate preventive services were discussed with this patient, including applicable screening as appropriate for cardiovascular disease, diabetes, osteopenia/osteoporosis, and glaucoma.  As appropriate for age/gender, discussed screening for colorectal cancer, prostate cancer, breast cancer, and cervical cancer. Checklist reviewing preventive services available has been given to the patient.    Reviewed patients plan of care and provided an AVS. The Basic Care Plan (routine " screening as documented in Health Maintenance) for Duane meets the Care Plan requirement. This Care Plan has been established and reviewed with the Patient.      Jose Coles MD  [facility]

## 2019-04-22 NOTE — PATIENT INSTRUCTIONS
Patient Education   Personalized Prevention Plan  You are due for the preventive services outlined below.  Your care team is available to assist you in scheduling these services.  If you have already completed any of these items, please share that information with your care team to update in your medical record.  Health Maintenance Due   Topic Date Due     Hepatitis C Screening  01/26/1968     Diptheria Tetanus Pertussis (DTAP/TDAP/TD) Vaccine (1 - Tdap) 01/26/1975     Zoster (Shingles) Vaccine (1 of 2) 01/26/2000     Discuss Advance Directive Planning  01/26/2005     AORTIC ANEURYSM SCREENING (SYSTEM ASSIGNED)  01/26/2015     Annual Wellness Visit  01/22/2017     FALL RISK ASSESSMENT  06/28/2017     Colon Cancer Screening - every 10 years.  08/19/2018     Flu Vaccine (1) 09/01/2018     PHQ-2  01/01/2019       Understanding [employer] MyPlate  The USDA (U.S. Department of Agriculture) has guidelines to help you make healthy food choices. These are called MyPlate. MyPlate shows the food groups that make up healthy meals using the image of a place setting. Before you eat, think about the healthiest choices for what to put onto your plate or into your cup or bowl. To learn more about building a healthy plate, visit www.choosemyplate.gov.    The food groups    Fruits. Any fruit or 100% fruit juice counts as part of the Fruit Group. Fruits may be fresh, canned, frozen, or dried, and may be whole, cut-up, or pureed. Make half your plate fruits and vegetables.    Vegetables. Any vegetable or 100% vegetable juice counts as a member of the Vegetable Group. Vegetables may be fresh, frozen, canned, or dried. They can be served raw or cooked and may be whole, cut-up, or mashed. Make half your plate fruits and vegetables.    Grains. All foods made from grains are part of the Grains Group. These include wheat, rice, oats, cornmeal, and barley such as bread, pasta, oatmeal, cereal, tortillas, and grits. Grains should be no more than a  quarter of your plate. At least half of your grains should be whole grains.    Protein. This group includes meat, poultry, seafood, beans and peas, eggs, processed soy products (like tofu), nuts (including nut butters), and seeds. Make protein choices no more than a quarter of your plate. Meat and poultry choices should be lean or low fat.    Dairy. All fluid milk products and foods made from milk that contain calcium, like yogurt and cheese, are part of the Dairy Group. (Foods that have little calcium, such as cream, butter, and cream cheese, are not part of the group.) Most dairy choices should be low-fat or fat-free.    Oils. These are fats that are liquid at room temperature. They include canola, corn, olive, soybean, and sunflower oil. Foods that are mainly oil include mayonnaise, certain salad dressings, and soft margarines. You should have only 5 to 7 teaspoons of oils a day. You probably already get this much from the food you eat.  Use [employer] to help build your meals  The [employer] can help you plan and track your meals and activity. You can look up individual foods to see or compare their nutritional value. You can get guidelines for what and how much you should eat. You can compare your food choices. And you can assess personal physical activities and see ways you can improve. Go to www.[employer] corporationmyplate.gov/supertracker/. For more eating tips and nutritional information, go to www.choosemyplate.gov/ten-tips.  Date Last Reviewed: 8/1/2017 2000-2018 [employer]. [address] [place] [address], [place] [address]. All rights reserved. This information is not intended as a substitute for professional medical care. Always follow your healthcare professional's instructions.

## 2019-04-22 NOTE — LETTER
April 24, 2019      Duane Johnson  [id number] [facility] [id number]        Dear ,    We are writing to inform you of your test results.    Labs look good.  I hope things are going well.    Resulted Orders   Basic metabolic panel  (Ca, Cl, CO2, Creat, Gluc, K, Na, BUN)   Result Value Ref Range    Sodium 135 133 - 144 mmol/L    Potassium 4.0 3.4 - 5.3 mmol/L    Chloride 100 94 - 109 mmol/L    Carbon Dioxide 31 20 - 32 mmol/L    Anion Gap 4 3 - 14 mmol/L    Glucose 92 70 - 99 mg/dL      Comment:      Fasting specimen    Urea Nitrogen 11 7 - 30 mg/dL    Creatinine 0.88 0.66 - 1.25 mg/dL    GFR Estimate 87 >60 mL/min/[1.73_m2]      Comment:      Non  GFR Calc  Starting 12/18/2018, serum creatinine based estimated GFR (eGFR) will be   calculated using the Chronic Kidney Disease Epidemiology Collaboration   (CKD-EPI) equation.      GFR Estimate If Black >90 >60 mL/min/[1.73_m2]      Comment:       GFR Calc  Starting 12/18/2018, serum creatinine based estimated GFR (eGFR) will be   calculated using the Chronic Kidney Disease Epidemiology Collaboration   (CKD-EPI) equation.      Calcium 9.5 8.5 - 10.1 mg/dL   Lipid panel reflex to direct LDL Fasting   Result Value Ref Range    Cholesterol 148 <200 mg/dL    Triglycerides 151 (H) <150 mg/dL      Comment:      Borderline high:  150-199 mg/dl  High:             200-499 mg/dl  Very high:       >499 mg/dl  Fasting specimen      HDL Cholesterol 34 (L) >39 mg/dL    LDL Cholesterol Calculated 84 <100 mg/dL      Comment:      Desirable:       <100 mg/dl    Non HDL Cholesterol 114 <130 mg/dL       If you have any questions or concerns, please call the clinic at the number listed above.       Sincerely,        Jose Coles MD/demetrio

## 2019-04-22 NOTE — NURSING NOTE
"Chief Complaint   Patient presents with     Physical       Initial /82 (BP Location: Right arm, Patient Position: Chair, Cuff Size: Adult Regular)   Pulse 68   Temp 97.7  F (36.5  C) (Tympanic)   Resp 18   Ht 1.702 m (5' 7\")   Wt 84.7 kg (186 lb 12.8 oz)   BMI 29.26 kg/m   Estimated body mass index is 29.26 kg/m  as calculated from the following:    Height as of this encounter: 1.702 m (5' 7\").    Weight as of this encounter: 84.7 kg (186 lb 12.8 oz).    Patient presents to the clinic using     Health Maintenance that is potentially due pending provider review:  Colonoscopy/FIT - pt informed and will schedule    Vania Koch MA  1:58 PM 4/22/2019  .        "

## 2019-06-29 ENCOUNTER — TELEPHONE (OUTPATIENT)
Dept: FAMILY MEDICINE | Facility: CLINIC | Age: 69
End: 2019-06-29

## 2019-06-29 NOTE — TELEPHONE ENCOUNTER
Panel Management Review      Composite cancer screening  Chart review shows that this patient is due/due soon for the following Colonoscopy  Summary:    Patient is due/failing the following:   COLONOSCOPY    Action needed:   Patient needs to complete a colonoscopy.     Type of outreach:    Phone, spoke to patient.  He has yet to schedule it. Mailing a letter of information about a colonoscopy and numbers he can call to schedule.     Questions for provider review:    None                                                                                                                                    Shanell Page, cma

## 2020-05-11 DIAGNOSIS — I10 BENIGN ESSENTIAL HYPERTENSION: ICD-10-CM

## 2020-05-11 RX ORDER — LISINOPRIL AND HYDROCHLOROTHIAZIDE 20; 25 MG/1; MG/1
TABLET ORAL
Qty: 45 TABLET | Refills: 0 | Status: SHIPPED | OUTPATIENT
Start: 2020-05-11 | End: 2023-03-13

## 2020-05-11 RX ORDER — SIMVASTATIN 40 MG
TABLET ORAL
Qty: 45 TABLET | Refills: 0 | Status: SHIPPED | OUTPATIENT
Start: 2020-05-11 | End: 2023-03-13

## 2020-12-25 NOTE — TELEPHONE ENCOUNTER
Pt states he is afraid to come in. States he knows he he is due. Has not checked his BP. Nanette Miranda RN  
s/p Lap Internal hernia reduction. POD#1

## 2021-05-26 ENCOUNTER — HOSPITAL ENCOUNTER (EMERGENCY)
Facility: CLINIC | Age: 71
Discharge: HOME | End: 2021-05-26
Attending: PHYSICIAN ASSISTANT | Admitting: PHYSICIAN ASSISTANT
Payer: MEDICARE

## 2021-05-26 ENCOUNTER — NURSE TRIAGE (OUTPATIENT)
Dept: NURSING | Facility: CLINIC | Age: 71
End: 2021-05-26

## 2021-05-26 ENCOUNTER — TELEPHONE (OUTPATIENT)
Dept: OTOLARYNGOLOGY | Facility: CLINIC | Age: 71
End: 2021-05-26

## 2021-05-26 VITALS
SYSTOLIC BLOOD PRESSURE: 121 MMHG | DIASTOLIC BLOOD PRESSURE: 76 MMHG | BODY MASS INDEX: 23.3 KG/M2 | OXYGEN SATURATION: 98 % | TEMPERATURE: 97.1 F | HEART RATE: 87 BPM | RESPIRATION RATE: 16 BRPM | HEIGHT: 66 IN | WEIGHT: 145 LBS

## 2021-05-26 DIAGNOSIS — R04.0 EPISTAXIS: ICD-10-CM

## 2021-05-26 DIAGNOSIS — R42 LIGHT HEADEDNESS: ICD-10-CM

## 2021-05-26 LAB
ANION GAP SERPL CALCULATED.3IONS-SCNC: 7 MMOL/L (ref 3–14)
BASOPHILS # BLD AUTO: 0 10E9/L (ref 0–0.2)
BASOPHILS NFR BLD AUTO: 0.3 %
BUN SERPL-MCNC: 41 MG/DL (ref 7–30)
CALCIUM SERPL-MCNC: 8.3 MG/DL (ref 8.5–10.1)
CHLORIDE SERPL-SCNC: 105 MMOL/L (ref 94–109)
CO2 SERPL-SCNC: 29 MMOL/L (ref 20–32)
CREAT SERPL-MCNC: 0.8 MG/DL (ref 0.66–1.25)
DIFFERENTIAL METHOD BLD: ABNORMAL
EOSINOPHIL # BLD AUTO: 0 10E9/L (ref 0–0.7)
EOSINOPHIL NFR BLD AUTO: 0.3 %
ERYTHROCYTE [DISTWIDTH] IN BLOOD BY AUTOMATED COUNT: 13.2 % (ref 10–15)
GFR SERPL CREATININE-BSD FRML MDRD: 90 ML/MIN/{1.73_M2}
GLUCOSE SERPL-MCNC: 125 MG/DL (ref 70–99)
HCT VFR BLD AUTO: 34.8 % (ref 40–53)
HGB BLD-MCNC: 12 G/DL (ref 13.3–17.7)
IMM GRANULOCYTES # BLD: 0.1 10E9/L (ref 0–0.4)
IMM GRANULOCYTES NFR BLD: 0.4 %
INR PPP: 1.15 (ref 0.86–1.14)
LYMPHOCYTES # BLD AUTO: 1.5 10E9/L (ref 0.8–5.3)
LYMPHOCYTES NFR BLD AUTO: 12.4 %
MCH RBC QN AUTO: 33.3 PG (ref 26.5–33)
MCHC RBC AUTO-ENTMCNC: 34.5 G/DL (ref 31.5–36.5)
MCV RBC AUTO: 97 FL (ref 78–100)
MONOCYTES # BLD AUTO: 0.5 10E9/L (ref 0–1.3)
MONOCYTES NFR BLD AUTO: 3.9 %
NEUTROPHILS # BLD AUTO: 10.1 10E9/L (ref 1.6–8.3)
NEUTROPHILS NFR BLD AUTO: 82.7 %
NRBC # BLD AUTO: 0 10*3/UL
NRBC BLD AUTO-RTO: 0 /100
PLATELET # BLD AUTO: 274 10E9/L (ref 150–450)
POTASSIUM SERPL-SCNC: 4.5 MMOL/L (ref 3.4–5.3)
RBC # BLD AUTO: 3.6 10E12/L (ref 4.4–5.9)
SODIUM SERPL-SCNC: 141 MMOL/L (ref 133–144)
WBC # BLD AUTO: 12.2 10E9/L (ref 4–11)

## 2021-05-26 PROCEDURE — 85610 PROTHROMBIN TIME: CPT | Performed by: PHYSICIAN ASSISTANT

## 2021-05-26 PROCEDURE — 30903 CONTROL OF NOSEBLEED: CPT | Mod: LT | Performed by: PHYSICIAN ASSISTANT

## 2021-05-26 PROCEDURE — 250N000013 HC RX MED GY IP 250 OP 250 PS 637: Performed by: PHYSICIAN ASSISTANT

## 2021-05-26 PROCEDURE — 85025 COMPLETE CBC W/AUTO DIFF WBC: CPT | Performed by: PHYSICIAN ASSISTANT

## 2021-05-26 PROCEDURE — 30903 CONTROL OF NOSEBLEED: CPT | Performed by: PHYSICIAN ASSISTANT

## 2021-05-26 PROCEDURE — 96360 HYDRATION IV INFUSION INIT: CPT | Performed by: PHYSICIAN ASSISTANT

## 2021-05-26 PROCEDURE — 99284 EMERGENCY DEPT VISIT MOD MDM: CPT | Mod: 25 | Performed by: PHYSICIAN ASSISTANT

## 2021-05-26 PROCEDURE — 80048 BASIC METABOLIC PNL TOTAL CA: CPT | Performed by: PHYSICIAN ASSISTANT

## 2021-05-26 PROCEDURE — 258N000003 HC RX IP 258 OP 636: Performed by: PHYSICIAN ASSISTANT

## 2021-05-26 PROCEDURE — 250N000009 HC RX 250: Performed by: PHYSICIAN ASSISTANT

## 2021-05-26 PROCEDURE — 96361 HYDRATE IV INFUSION ADD-ON: CPT | Performed by: PHYSICIAN ASSISTANT

## 2021-05-26 RX ORDER — SODIUM CHLORIDE 9 MG/ML
INJECTION, SOLUTION INTRAVENOUS CONTINUOUS
Status: DISCONTINUED | OUTPATIENT
Start: 2021-05-26 | End: 2021-05-26 | Stop reason: HOSPADM

## 2021-05-26 RX ORDER — TRANEXAMIC ACID 100 MG/ML
INJECTION, SOLUTION INTRAVENOUS ONCE
Status: COMPLETED | OUTPATIENT
Start: 2021-05-26 | End: 2021-05-26

## 2021-05-26 RX ADMIN — SODIUM CHLORIDE 1000 ML: 9 INJECTION, SOLUTION INTRAVENOUS at 08:29

## 2021-05-26 RX ADMIN — Medication 1 DROP: at 08:30

## 2021-05-26 RX ADMIN — TRANEXAMIC ACID 1000 MG: 1 INJECTION, SOLUTION INTRAVENOUS at 08:30

## 2021-05-26 ASSESSMENT — ENCOUNTER SYMPTOMS
FEVER: 0
FATIGUE: 0
LIGHT-HEADEDNESS: 1
WHEEZING: 0
SHORTNESS OF BREATH: 0
PSYCHIATRIC NEGATIVE: 1
GASTROINTESTINAL NEGATIVE: 1
COUGH: 0
RESPIRATORY NEGATIVE: 1
NAUSEA: 0
HEADACHES: 0
ABDOMINAL PAIN: 0
CONFUSION: 0
VOMITING: 0
DIZZINESS: 0
CARDIOVASCULAR NEGATIVE: 1
DIARRHEA: 0
CONSTITUTIONAL NEGATIVE: 1

## 2021-05-26 ASSESSMENT — MIFFLIN-ST. JEOR: SCORE: 1355.47

## 2021-05-26 NOTE — DISCHARGE INSTRUCTIONS
Increase fluids, rest, tylenol over the counter as needed for pain.   Keep nasal packing in until rechecked and removed by ENT or primary care provider in 2 days. If you can not get in with either return to the Emergency Room for nasal packing removal in 2 days     Follow up with ENT for recheck and nasal rocket/packing removal in 2 days.     Return to the Emergency Room if dizziness, persistent bleeding, or change/worsening of symptoms occur.

## 2021-05-26 NOTE — TELEPHONE ENCOUNTER
Reason for Call:  Other appointment    Detailed comments: Pt was just in ER for a bloody nose - needs recheck and nasal rocket removed on Fri.  No appts available.    Phone Number Patient can be reached at: Home number on file [phone] (home) Melissa - wife    Best Time:     Can we leave a detailed message on this number? Not Applicable    Call taken on 5/26/2021 at 1:11 PM by Beth Payne

## 2021-05-26 NOTE — TELEPHONE ENCOUNTER
"Patient calls in with nose bleed going on 12 hours. Wife states \"blood pouring out\". Patient intermittently able to slow bleeding down with direct pressure, but then will start bleeding after pressure gone. Patient states he feels weak/tired from \"loss of blood\". Disposition go to the ED now Patient verbalized understanding and agrees with plan.     Paulina Shepard RN  [facility] Nurse Advisor  7:09 AM 5/26/2021    Reason for Disposition    [1] Bleeding present > 30 minutes AND [2] using correct method of direct pressure    Dizziness or lightheadedness    Additional Information    Negative: Fainted or too weak to stand following large blood loss    Negative: Sounds like a life-threatening emergency to the triager    Protocols used: NOSEBLEED-A-AH      "

## 2021-05-26 NOTE — TELEPHONE ENCOUNTER
I spoke to Duane today. He was seen in the ER today for Epistaxis. He has a nasal Rocket. He was told that he could get it out on Friday. I told him that it is usually 72 hours but that I spoke to the ENT and that we will see him on Friday and do our best to remove the Nasal Rocket. If there is bleeding we will repack. He is in agreement with the plan. Joya WATERMAN Rn

## 2021-05-26 NOTE — ED PROVIDER NOTES
History     Chief Complaint   Patient presents with     Epistaxis     nose bleed started 6 pm last night after lifting heavy item. takes reg ASA daily     Generalized Weakness     HPI  Duane C Johnson is a 71 year old male with history of hyperlipidemia, hypertension and daily for aspirin use.  Patient states he was lifting a heavy item last night around 6:30 PM and shortly after he started noticing active heavy bleeding from the left nostril.  Patient states it has been constant since.  He has not been able to get it to stop bleeding for over 12 hours.  He states that at times he feels like his sinuses are full with blood and he is also noticing blood in the back of his throat.  Patient denies any shortness of breath, abdominal pain, nausea or vomiting, cough or known aspiration of the blood to the lungs.  Patient however does state that this morning he is feeling lightheaded with standing and slightly weak.  Patient denies any other blood thinners other than the full dose aspirin that he takes daily.     Allergies:  No Known Allergies    Problem List:    Patient Active Problem List    Diagnosis Date Noted     Hyperlipidemia LDL goal <130 01/26/2016     Priority: Medium     Benign essential hypertension 01/26/2016     Priority: Medium        Past Medical History:    Past Medical History:   Diagnosis Date     Hypertension        Past Surgical History:    No past surgical history on file.    Family History:    Family History   Problem Relation Age of Onset     Other Cancer Mother      Other Cancer Sister        Social History:  Marital Status:   [2]  Social History     Tobacco Use     Smoking status: Current Every Day Smoker     Packs/day: 0.25     Types: Cigarettes     Smokeless tobacco: Never Used   Substance Use Topics     Alcohol use: Yes     Drug use: No        Medications:    aspirin (ASA) 325 MG EC tablet  lisinopril-hydrochlorothiazide (ZESTORETIC) 20-25 MG tablet  simvastatin (ZOCOR) 40 MG  "tablet          Review of Systems   Constitutional: Negative.  Negative for fatigue and fever.   HENT: Positive for congestion and nosebleeds.    Respiratory: Negative.  Negative for cough, shortness of breath and wheezing.    Cardiovascular: Negative.  Negative for chest pain.   Gastrointestinal: Negative.  Negative for abdominal pain, diarrhea, nausea and vomiting.   Skin: Negative.    Neurological: Positive for light-headedness. Negative for dizziness and headaches.   Psychiatric/Behavioral: Negative.  Negative for confusion.   All other systems reviewed and are negative.      Physical Exam   BP: (!) 81/58  Pulse: 93  Temp: 97.1  F (36.2  C)  Resp: 18  Height: 167.6 cm (5' 6\")  Weight: 65.8 kg (145 lb)  SpO2: 98 %      Physical Exam  Vitals signs and nursing note reviewed.   Constitutional:       General: He is awake. He is not in acute distress.     Appearance: Normal appearance. He is well-developed, well-groomed and normal weight. He is not ill-appearing, toxic-appearing or diaphoretic.   HENT:      Head: Normocephalic and atraumatic.      Nose: No nasal deformity, septal deviation, signs of injury, laceration, nasal tenderness, mucosal edema or rhinorrhea.      Right Nostril: No foreign body, epistaxis, septal hematoma or occlusion.      Left Nostril: Epistaxis present. No foreign body, septal hematoma or occlusion.      Mouth/Throat:      Lips: Pink.      Mouth: Mucous membranes are moist. No oral lesions or angioedema.      Pharynx: Oropharynx is clear. Uvula midline. No oropharyngeal exudate or posterior oropharyngeal erythema.      Comments: Bleeding noted to the posterior pharynx when nasal clamp removed.   Eyes:      General: No scleral icterus.     Extraocular Movements: Extraocular movements intact.      Conjunctiva/sclera: Conjunctivae normal.      Pupils: Pupils are equal, round, and reactive to light.   Neck:      Musculoskeletal: Normal range of motion and neck supple.   Cardiovascular:      Rate " and Rhythm: Normal rate and regular rhythm.      Heart sounds: Normal heart sounds.   Pulmonary:      Effort: Pulmonary effort is normal.      Breath sounds: Normal breath sounds.   Skin:     General: Skin is warm.      Capillary Refill: Capillary refill takes less than 2 seconds.      Findings: No bruising, erythema or rash.   Neurological:      General: No focal deficit present.      Mental Status: He is alert and oriented to person, place, and time.      GCS: GCS eye subscore is 4. GCS verbal subscore is 5. GCS motor subscore is 6.      Cranial Nerves: Cranial nerves are intact. No cranial nerve deficit.      Sensory: Sensation is intact.      Motor: Motor function is intact. No weakness.      Gait: Gait normal.   Psychiatric:         Mood and Affect: Mood normal.         Behavior: Behavior normal. Behavior is cooperative.         Thought Content: Thought content normal.         Judgment: Judgment normal.         ED [facility]    -Epistaxis Mgmt    Date/Time: 5/26/2021 9:10 AM  Performed by: Collette Saravia PA-C  Authorized by: Collette Saravia PA-C       ANESTHESIA (see MAR for exact dosages)     Anesthesia method:  None  PROCEDURE DETAILS     Treatment site:  Unable to specify    Treatment method:  Nasal tampon (7.5cm nasal tampon placed into left nostril and balloon filled. )    Treatment complexity:  Limited    Treatment episode: no recurrence of recent bleed        POST PROCEDURE     Assessment:  Bleeding stopped  PROCEDURE   Patient Tolerance:  Patient tolerated the procedure well with no immediate complications  Describe Procedure: Nasal clamp was removed from nose and nasal passage visualized with no blood clots noted or specific anterior bleeding. Positive bleeding returned to the posterior pharynx once clamp was removed. Afrin sprayed into bilateral nostrils after patient blew nose well with no significant blot clots noted. 7.5cm nasal tampon was  soaked in TXA for 30 seconds and then placed into left nostril with no complications. Balloon inflated and taped to left cheek. On recheck of posterior pharynx bleeding had stopped. Patient rechecked several times prior to discharged with no active bleeding noted to the posterior pharynx or draining out of the left nostril.                 Critical Care time:  none                Results for orders placed or performed during the hospital encounter of 05/26/21 (from the past 24 hour(s))   CBC with platelets differential   Result Value Ref Range    WBC 12.2 (H) 4.0 - 11.0 10e9/L    RBC Count 3.60 (L) 4.4 - 5.9 10e12/L    Hemoglobin 12.0 (L) 13.3 - 17.7 g/dL    Hematocrit 34.8 (L) 40.0 - 53.0 %    MCV 97 78 - 100 fl    MCH 33.3 (H) 26.5 - 33.0 pg    MCHC 34.5 31.5 - 36.5 g/dL    RDW 13.2 10.0 - 15.0 %    Platelet Count 274 150 - 450 10e9/L    Diff Method Automated Method     % Neutrophils 82.7 %    % Lymphocytes 12.4 %    % Monocytes 3.9 %    % Eosinophils 0.3 %    % Basophils 0.3 %    % Immature Granulocytes 0.4 %    Nucleated RBCs 0 0 /100    Absolute Neutrophil 10.1 (H) 1.6 - 8.3 10e9/L    Absolute Lymphocytes 1.5 0.8 - 5.3 10e9/L    Absolute Monocytes 0.5 0.0 - 1.3 10e9/L    Absolute Eosinophils 0.0 0.0 - 0.7 10e9/L    Absolute Basophils 0.0 0.0 - 0.2 10e9/L    Abs Immature Granulocytes 0.1 0 - 0.4 10e9/L    Absolute Nucleated RBC 0.0    Basic metabolic panel   Result Value Ref Range    Sodium 141 133 - 144 mmol/L    Potassium 4.5 3.4 - 5.3 mmol/L    Chloride 105 94 - 109 mmol/L    Carbon Dioxide 29 20 - 32 mmol/L    Anion Gap 7 3 - 14 mmol/L    Glucose 125 (H) 70 - 99 mg/dL    Urea Nitrogen 41 (H) 7 - 30 mg/dL    Creatinine 0.80 0.66 - 1.25 mg/dL    GFR Estimate 90 >60 mL/min/[1.73_m2]    GFR Estimate If Black >90 >60 mL/min/[1.73_m2]    Calcium 8.3 (L) 8.5 - 10.1 mg/dL   INR   Result Value Ref Range    INR 1.15 (H) 0.86 - 1.14       Medications   0.9% sodium chloride BOLUS (0 mLs Intravenous Stopped 5/26/21 1012)      Followed by   sodium chloride 0.9% infusion (has no administration in time range)   phenylephrine (ASIA-SYNEPHRINE) 0.5 % spray 1 drop (1 drop Both Nostrils Given by Other 5/26/21 0830)   tranexamic acid (CYKLOKAPRON) TOPICAL (injection used topically) (1,000 mg Topical Given by Other 5/26/21 0830)       Assessments & Plan (with Medical Decision Making)     I have reviewed the nursing notes.    I have reviewed the findings, diagnosis, plan and need for follow up with the patient.  Duane C Johnson is a 71 year old male with history of hyperlipidemia, hypertension and daily for aspirin use.  Patient states he was lifting a heavy item last night around 6:30 PM and shortly after he started noticing active heavy bleeding from the left nostril.  Patient states it has been constant since.  He has not been able to get it to stop bleeding for over 12 hours.  He states that at times he feels like his sinuses are full with blood and he is also noticing blood in the back of his throat.  Patient denies any shortness of breath, abdominal pain, nausea or vomiting, cough or known aspiration of the blood to the lungs.  Patient however does state that this morning he is feeling lightheaded with standing and slightly weak.  Patient denies any other blood thinners other than the full dose aspirin that he takes daily.     See exam findings above. Patient cooperative and in no acute distress, but with mild hypotension noted on initial vitals. Patient was given 1 liter normal saline fluids and nasal tampon placed into the left nostril without complications. Patient's labs with no significant abnormal findings.  Platelets within normal limits.  Hemoglobin slightly low at 12.0.  WBC elevated 12.2 however no concern for infection at this time.  This is most likely reactive due to patient's nosebleed has been going on for more than 12 hours.  Patient tolerated procedure well with no complication and epistaxis stopped. Patient was feeling  much better with improvement of his light headedness after the litter of IV fluids. Patient discharged in stable condition and informed to have nasal tampon removed in 2 days by ENT. ENT referral given.  Did inform patient that if he cannot get into ENT respiratory care doctor for nasal tampon removal he can return the emergency department in 2 days.  Patient is in agreement this plan and discharged in stable condition.  Patient return the emergency department if bleeding returns, worsening lightheadedness or weakness, or change in symptoms occur.    Discharge Medication List as of 5/26/2021 10:13 AM          Final diagnoses:   Epistaxis   Light headedness       5/26/2021   [facility] EMERGENCY DEPT     Collette Saravia PA-C  05/26/21 1131

## 2021-05-27 NOTE — PROGRESS NOTES
Chief Complaint   Patient presents with     Epistaxis     seen in ER on 5/26/21 for nosebleed and left side packed- here to get the packing out     History of Present Illness   Duane C Johnson is a 71 year old male presents for nasal evaluation.  I am seeing this patient in consultation for epistaxis at the request of the provider Collette Saravia PA-C.  The patient developed left sided epistaxis requiring placement of a rapidrhinio on 5/26/2021. The patient really has not had a significant history of epistaxis until recently.  He was doing some heavy lifting and afterwards developed fairly persistent left-sided epistaxis.  He would get the bleeding to stop but then it would restart.  That is when he presented to urgent care and rapid Rhino was placed on 5/26/2021.  He does not take any blood thinners.  He does take a daily aspirin.  He is not previously required nasal packing or had to go to the ER for nosebleed prior to his most recent episode.  No prior history of nose or sinus surgery.  The patient has no personal or family history of bleeding disorders.     Past Medical History  Patient Active Problem List   Diagnosis     Hyperlipidemia LDL goal <130     Benign essential hypertension     Current Medications     Current Outpatient Medications:      aspirin (ASA) 325 MG EC tablet, Take 1 tablet (325 mg) by mouth daily, Disp: 90 tablet, Rfl: 0     lisinopril-hydrochlorothiazide (ZESTORETIC) 20-25 MG tablet, TAKE ONE-HALF TABLET BY MOUTH EVERY DAY (Patient not taking: Reported on 5/28/2021), Disp: 45 tablet, Rfl: 0     simvastatin (ZOCOR) 40 MG tablet, TAKE ONE-HALF TABLET BY MOUTH EVERY NIGHT AT BEDTIME (Patient not taking: Reported on 5/28/2021), Disp: 45 tablet, Rfl: 0    Allergies  No Known Allergies    Social History   Social History     Socioeconomic History     Marital status:      Spouse name: Not on file     Number of children: Not on file     Years of education: Not on file     Highest education  level: Not on file   Occupational History     Not on file   Social Needs     Financial resource strain: Not on file     Food insecurity     Worry: Not on file     Inability: Not on file     Transportation needs     Medical: Not on file     Non-medical: Not on file   Tobacco Use     Smoking status: Current Every Day Smoker     Packs/day: 0.25     Types: Cigarettes     Smokeless tobacco: Never Used   Substance and Sexual Activity     Alcohol use: Yes     Drug use: No     Sexual activity: Yes     Partners: Female   Lifestyle     Physical activity     Days per week: Not on file     Minutes per session: Not on file     Stress: Not on file   Relationships     Social connections     Talks on phone: Not on file     Gets together: Not on file     Attends Rastafari service: Not on file     Active member of club or organization: Not on file     Attends meetings of clubs or organizations: Not on file     Relationship status: Not on file     Intimate partner violence     Fear of current or ex partner: Not on file     Emotionally abused: Not on file     Physically abused: Not on file     Forced sexual activity: Not on file   Other Topics Concern     Parent/sibling w/ CABG, MI or angioplasty before 65F 55M? Not Asked   Social History Narrative     Not on file       Family History  Family History   Problem Relation Age of Onset     Other Cancer Mother      Cervical Cancer Sister      GI problems Father        Review of Systems  As per HPI and PMHx, otherwise 10+ comprehensive system review is negative.    Physical Exam  BP (!) 153/77 (BP Location: Right arm, Patient Position: Chair, Cuff Size: Adult Regular)   Pulse 75   Temp 97.4  F (36.3  C) (Tympanic)   Wt 68.5 kg (151 lb)   BMI 24.37 kg/m    GENERAL: Patient is a pleasant, cooperative 71 year old male in no acute distress.  HEAD: Normocephalic, atraumatic.  Hair and scalp are normal.  EYES: Pupils are equal, round, reactive to light and accommodation.  Extraocular movements  are intact.  The sclera nonicteric without injection.  The extraocular structures are normal.  EARS: Normal shape and symmetry.  No tenderness when palpating the mastoid or tragal areas bilaterally.    NOSE: There is a rapid Rhino in place on the left-hand side secured to the cheek with tape.  There is no obvious bleeding anteriorly.    NEUROLOGIC: Cranial nerves II through XII are grossly intact.  Voice is strong.  Patient is House-Brackmann I/VI bilaterally.  CARDIOVASCULAR: Extremities are warm and well-perfused.  No significant peripheral edema.  RESPIRATORY: Patient has nonlabored breathing without cough, wheeze, stridor.  PSYCHIATRIC: Patient is alert and oriented.  Mood and affect appear normal.  SKIN: Warm and dry.  No scalp, face, or neck lesions noted.    Procedure: Endoscopic control of left anterior nasal hemorrhage  Indication: Nasal packing removal, epistaxis     The patient had a rapid Rhino in place on the left-hand side.  The rapid Rhino was deflated and the nasal packing was removed.  The left nasal cavity was anesthetized and decongested with a mixture of lidocaine and neosynephrine both nasal spray and on pledgets.  The left nasal cavity was then examined using a rigid 30 degree nasal endoscope.  I was able to visualize several areas of punctate bleeding on the inferior turbinate.  I placed a sheet of Surgicel over these oozing areas with good hemostasis.  There are no other obvious areas of active bleeding to require cauterization.  Hemostasis was achieved.  Scope was removed, the patient tolerated the procedure well.      Assessment and Plan    ICD-10-CM    1. Epistaxis  R04.0 NASAL/SINUS SCOPE W CONTROL NASAL HEMORRHAGE   2. Encounter for removal of nasal packing  Z48.00 NASAL/SINUS SCOPE W CONTROL NASAL HEMORRHAGE   3. Essential hypertension  I10 NASAL/SINUS SCOPE W CONTROL NASAL HEMORRHAGE   4. Tobacco dependence syndrome  F17.200    5. Encounter for tobacco use cessation counseling  Z71.6        It was my pleasure seeing Duane C Johnson today in clinic.  The patient presented today for nasal packing removal.  We were able to remove his nasal packing.  We controlled his epistaxis endoscopically using Surgicel with good hemostatic effect.  We discussed restrictions on manipulation and picking of the nose.  Also, sleeping with the head elevated, and avoidance of strenuous activity, heavy lifting, and bending over until the septum heals. I explained using vaseline twice daily to the anterior septum, nasal saline spray 3-5 times per day, and a humidifier at the bedside at night.    We discussed using Afrin for severe nosebleeds.  I also explained applying digital pressure to the nasal tip for a minimum of 15-20 minutes to stop a nose bleed. If that doesn't stop the bleeding the patient needs to proceed to the nearest emergency department. I will see the patient back as needed with any problems or concerns.      Duane to follow up with Primary Care provider regarding elevated blood pressure.    Fantasma Moya MD  Department of Otolarygology-Head and Neck Surgery  -[facility]

## 2021-05-28 ENCOUNTER — OFFICE VISIT (OUTPATIENT)
Dept: OTOLARYNGOLOGY | Facility: CLINIC | Age: 71
End: 2021-05-28
Payer: MEDICARE

## 2021-05-28 VITALS
WEIGHT: 151 LBS | SYSTOLIC BLOOD PRESSURE: 153 MMHG | HEART RATE: 75 BPM | BODY MASS INDEX: 24.37 KG/M2 | TEMPERATURE: 97.4 F | DIASTOLIC BLOOD PRESSURE: 77 MMHG

## 2021-05-28 DIAGNOSIS — Z48.00 ENCOUNTER FOR REMOVAL OF NASAL PACKING: ICD-10-CM

## 2021-05-28 DIAGNOSIS — R04.0 EPISTAXIS: Primary | ICD-10-CM

## 2021-05-28 DIAGNOSIS — Z71.6 ENCOUNTER FOR TOBACCO USE CESSATION COUNSELING: ICD-10-CM

## 2021-05-28 DIAGNOSIS — F17.200 TOBACCO DEPENDENCE SYNDROME: ICD-10-CM

## 2021-05-28 DIAGNOSIS — I10 ESSENTIAL HYPERTENSION: ICD-10-CM

## 2021-05-28 PROCEDURE — 99203 OFFICE O/P NEW LOW 30 MIN: CPT | Mod: 25 | Performed by: OTOLARYNGOLOGY

## 2021-05-28 PROCEDURE — 31238 NSL/SINS NDSC SRG NSL HEMRRG: CPT | Mod: LT | Performed by: OTOLARYNGOLOGY

## 2021-05-28 SDOH — HEALTH STABILITY: MENTAL HEALTH: HOW OFTEN DO YOU HAVE 6 OR MORE DRINKS ON ONE OCCASION?: NOT ASKED

## 2021-05-28 SDOH — HEALTH STABILITY: MENTAL HEALTH: HOW MANY STANDARD DRINKS CONTAINING ALCOHOL DO YOU HAVE ON A TYPICAL DAY?: NOT ASKED

## 2021-05-28 SDOH — HEALTH STABILITY: MENTAL HEALTH: HOW OFTEN DO YOU HAVE A DRINK CONTAINING ALCOHOL?: NOT ASKED

## 2021-05-28 ASSESSMENT — PAIN SCALES - GENERAL: PAINLEVEL: NO PAIN (0)

## 2021-05-28 NOTE — NURSING NOTE
This laryngoscopy scope was used on this patient.    Rigid    Ab Storz Rigid #[id number] Pediatric/Adult/Post-op sinus

## 2021-05-28 NOTE — NURSING NOTE
"Initial BP (!) 153/77 (BP Location: Right arm, Patient Position: Chair, Cuff Size: Adult Regular)   Pulse 75   Temp 97.4  F (36.3  C) (Tympanic)   Wt 68.5 kg (151 lb)   BMI 24.37 kg/m   Estimated body mass index is 24.37 kg/m  as calculated from the following:    Height as of 5/26/21: 1.676 m (5' 6\").    Weight as of this encounter: 68.5 kg (151 lb). .    Eli Simon LPN    "

## 2021-05-28 NOTE — LETTER
5/28/2021         RE: Duane C Johnson  [address] [address] [place] [id number]        Dear Colleague,    Thank you for referring your patient, Duane C Johnson, to the [facility]. Please see a copy of my visit note below.    Chief Complaint   Patient presents with     Epistaxis     seen in ER on 5/26/21 for nosebleed and left side packed- here to get the packing out     History of Present Illness   Duane C Johnson is a 71 year old male presents for nasal evaluation.  I am seeing this patient in consultation for epistaxis at the request of the provider Collette Saravia PA-C.  The patient developed left sided epistaxis requiring placement of a rapidrhinio on 5/26/2021. The patient really has not had a significant history of epistaxis until recently.  He was doing some heavy lifting and afterwards developed fairly persistent left-sided epistaxis.  He would get the bleeding to stop but then it would restart.  That is when he presented to urgent care and rapid Rhino was placed on 5/26/2021.  He does not take any blood thinners.  He does take a daily aspirin.  He is not previously required nasal packing or had to go to the ER for nosebleed prior to his most recent episode.  No prior history of nose or sinus surgery.  The patient has no personal or family history of bleeding disorders.     Past Medical History  Patient Active Problem List   Diagnosis     Hyperlipidemia LDL goal <130     Benign essential hypertension     Current Medications     Current Outpatient Medications:      aspirin (ASA) 325 MG EC tablet, Take 1 tablet (325 mg) by mouth daily, Disp: 90 tablet, Rfl: 0     lisinopril-hydrochlorothiazide (ZESTORETIC) 20-25 MG tablet, TAKE ONE-HALF TABLET BY MOUTH EVERY DAY (Patient not taking: Reported on 5/28/2021), Disp: 45 tablet, Rfl: 0     simvastatin (ZOCOR) 40 MG tablet, TAKE ONE-HALF TABLET BY MOUTH EVERY NIGHT AT BEDTIME (Patient not taking: Reported on 5/28/2021), Disp: 45 tablet, Rfl:  0    Allergies  No Known Allergies    Social History   Social History     Socioeconomic History     Marital status:      Spouse name: Not on file     Number of children: Not on file     Years of education: Not on file     Highest education level: Not on file   Occupational History     Not on file   Social Needs     Financial resource strain: Not on file     Food insecurity     Worry: Not on file     Inability: Not on file     Transportation needs     Medical: Not on file     Non-medical: Not on file   Tobacco Use     Smoking status: Current Every Day Smoker     Packs/day: 0.25     Types: Cigarettes     Smokeless tobacco: Never Used   Substance and Sexual Activity     Alcohol use: Yes     Drug use: No     Sexual activity: Yes     Partners: Female   Lifestyle     Physical activity     Days per week: Not on file     Minutes per session: Not on file     Stress: Not on file   Relationships     Social connections     Talks on phone: Not on file     Gets together: Not on file     Attends Baptism service: Not on file     Active member of club or organization: Not on file     Attends meetings of clubs or organizations: Not on file     Relationship status: Not on file     Intimate partner violence     Fear of current or ex partner: Not on file     Emotionally abused: Not on file     Physically abused: Not on file     Forced sexual activity: Not on file   Other Topics Concern     Parent/sibling w/ CABG, MI or angioplasty before 65F 55M? Not Asked   Social History Narrative     Not on file       Family History  Family History   Problem Relation Age of Onset     Other Cancer Mother      Cervical Cancer Sister      GI problems Father        Review of Systems  As per HPI and PMHx, otherwise 10+ comprehensive system review is negative.    Physical Exam  BP (!) 153/77 (BP Location: Right arm, Patient Position: Chair, Cuff Size: Adult Regular)   Pulse 75   Temp 97.4  F (36.3  C) (Tympanic)   Wt 68.5 kg (151 lb)   BMI  24.37 kg/m    GENERAL: Patient is a pleasant, cooperative 71 year old male in no acute distress.  HEAD: Normocephalic, atraumatic.  Hair and scalp are normal.  EYES: Pupils are equal, round, reactive to light and accommodation.  Extraocular movements are intact.  The sclera nonicteric without injection.  The extraocular structures are normal.  EARS: Normal shape and symmetry.  No tenderness when palpating the mastoid or tragal areas bilaterally.    NOSE: There is a rapid Rhino in place on the left-hand side secured to the cheek with tape.  There is no obvious bleeding anteriorly.    NEUROLOGIC: Cranial nerves II through XII are grossly intact.  Voice is strong.  Patient is House-Brackmann I/VI bilaterally.  CARDIOVASCULAR: Extremities are warm and well-perfused.  No significant peripheral edema.  RESPIRATORY: Patient has nonlabored breathing without cough, wheeze, stridor.  PSYCHIATRIC: Patient is alert and oriented.  Mood and affect appear normal.  SKIN: Warm and dry.  No scalp, face, or neck lesions noted.    Procedure: Endoscopic control of left anterior nasal hemorrhage  Indication: Nasal packing removal, epistaxis     The patient had a rapid Rhino in place on the left-hand side.  The rapid Rhino was deflated and the nasal packing was removed.  The left nasal cavity was anesthetized and decongested with a mixture of lidocaine and neosynephrine both nasal spray and on pledgets.  The left nasal cavity was then examined using a rigid 30 degree nasal endoscope.  I was able to visualize several areas of punctate bleeding on the inferior turbinate.  I placed a sheet of Surgicel over these oozing areas with good hemostasis.  There are no other obvious areas of active bleeding to require cauterization.  Hemostasis was achieved.  Scope was removed, the patient tolerated the procedure well.      Assessment and Plan    ICD-10-CM    1. Epistaxis  R04.0 NASAL/SINUS SCOPE W CONTROL NASAL HEMORRHAGE   2. Encounter for removal  of nasal packing  Z48.00 NASAL/SINUS SCOPE W CONTROL NASAL HEMORRHAGE   3. Essential hypertension  I10 NASAL/SINUS SCOPE W CONTROL NASAL HEMORRHAGE   4. Tobacco dependence syndrome  F17.200    5. Encounter for tobacco use cessation counseling  Z71.6       It was my pleasure seeing Duane C Johnson today in clinic.  The patient presented today for nasal packing removal.  We were able to remove his nasal packing.  We controlled his epistaxis endoscopically using Surgicel with good hemostatic effect.  We discussed restrictions on manipulation and picking of the nose.  Also, sleeping with the head elevated, and avoidance of strenuous activity, heavy lifting, and bending over until the septum heals. I explained using vaseline twice daily to the anterior septum, nasal saline spray 3-5 times per day, and a humidifier at the bedside at night.    We discussed using Afrin for severe nosebleeds.  I also explained applying digital pressure to the nasal tip for a minimum of 15-20 minutes to stop a nose bleed. If that doesn't stop the bleeding the patient needs to proceed to the nearest emergency department. I will see the patient back as needed with any problems or concerns.      Duane to follow up with Primary Care provider regarding elevated blood pressure.    Fantasma Moya MD  Department of Otolarygology-Head and Neck Surgery  [facility]         Again, thank you for allowing me to participate in the care of your patient.        Sincerely,        Fantasma Moya MD     details…

## 2021-05-28 NOTE — PATIENT INSTRUCTIONS
"Per physician's instructions    Epistaxis (Nosebleed) Discharge Instructions     It is normal to have a small amount of pink/blood tinged mucous oozing after packing removal. Sleeping with the head elevated, avoidance of strenuous activity, heavy lifting, and bending over for 2-3 days until septum heals. You may use Vaseline/Aquaphor twice daily to the anterior septum and nasal saline spray 3-5 times daily to help with healing.      If you develop a nosebleed, you can spray Afrin (oxymetazoline nasal spray) in the side of his bleeding.  Apply pressure to the outside of the nose (over the flexible end of the nose) for 15 minutes and lean forward so that you do not swallow blood.  Hold constant, firm pressure for the entire duration without \"peeking\" to see if it has stopped as this will likely result in repeated bleeding.  If you continue to have a nose bleed or if the bleeding is very rapid or excessive, please present to the nearest emergency department emergently for medical evaluation.     In order to decrease your risk for future nosebleeds we recommend the following strategies:     1. Avoid aspirin or other similar medications, unless prescribed by your physician  2. If you are taking Coumadin or other blood thinners, we recommend tight management to avoid excessive blood thinning which can cause recurrent bleeding.  3. Avoid trauma to your nose.  This includes irritation from exploring digits (nose picking) and excessive nose blowing.  4. If you have elevated blood pressure, keeping your blood pressure controlled will decrease your risk for future bleeds.  5. Using a room humidifier at night, especially during the dry winter months will keep your nose moist and less susceptible to bleeding.  6. Use Vaseline or Aquaphor in the nose at nighttime to help with moisture.  7. For those with a history of many severe nose bleeds, it is helpful to irrigate the nose twice daily with normal saline (salt water rinses) to " keep the nasal mucosa healthy.  You can also use AYR saline gel in the nose.     Nosebleeds are very common and regularly occur in otherwise healthy people, however frequent or recurrent severe nose bleeds may be a sign of a serious underlying medical condition.  We recommend that you discuss this episode with your primary care physician so that, if warranted, further testing may be performed.     If you have questions or concerns on any instructions given to you by your provider today or if you need to schedule an appointment, you can reach us at [phone].

## 2023-03-13 ENCOUNTER — OFFICE VISIT (OUTPATIENT)
Dept: FAMILY MEDICINE | Facility: CLINIC | Age: 73
End: 2023-03-13
Payer: MEDICARE

## 2023-03-13 VITALS
SYSTOLIC BLOOD PRESSURE: 150 MMHG | HEIGHT: 66 IN | TEMPERATURE: 97.8 F | RESPIRATION RATE: 18 BRPM | WEIGHT: 148 LBS | HEART RATE: 59 BPM | OXYGEN SATURATION: 98 % | DIASTOLIC BLOOD PRESSURE: 98 MMHG | BODY MASS INDEX: 23.78 KG/M2

## 2023-03-13 DIAGNOSIS — D64.9 ANEMIA, UNSPECIFIED TYPE: Primary | ICD-10-CM

## 2023-03-13 DIAGNOSIS — R79.9 ABNORMAL FINDING OF BLOOD CHEMISTRY, UNSPECIFIED: ICD-10-CM

## 2023-03-13 DIAGNOSIS — I10 BENIGN ESSENTIAL HYPERTENSION: ICD-10-CM

## 2023-03-13 DIAGNOSIS — Z12.11 SPECIAL SCREENING FOR MALIGNANT NEOPLASMS, COLON: ICD-10-CM

## 2023-03-13 DIAGNOSIS — Z13.1 SCREENING FOR DIABETES MELLITUS: ICD-10-CM

## 2023-03-13 DIAGNOSIS — Z12.5 SCREENING FOR PROSTATE CANCER: ICD-10-CM

## 2023-03-13 DIAGNOSIS — F17.210 CIGARETTE NICOTINE DEPENDENCE WITHOUT COMPLICATION: ICD-10-CM

## 2023-03-13 DIAGNOSIS — R19.09 GROIN SWELLING: ICD-10-CM

## 2023-03-13 DIAGNOSIS — Z23 NEED FOR VACCINATION: ICD-10-CM

## 2023-03-13 DIAGNOSIS — Z13.6 SCREENING FOR AAA (ABDOMINAL AORTIC ANEURYSM): ICD-10-CM

## 2023-03-13 DIAGNOSIS — Z00.00 ENCOUNTER FOR MEDICARE ANNUAL WELLNESS EXAM: Primary | ICD-10-CM

## 2023-03-13 DIAGNOSIS — E78.5 HYPERLIPIDEMIA LDL GOAL <130: ICD-10-CM

## 2023-03-13 LAB
ANION GAP SERPL CALCULATED.3IONS-SCNC: 9 MMOL/L (ref 7–15)
BUN SERPL-MCNC: 7.3 MG/DL (ref 8–23)
CALCIUM SERPL-MCNC: 10.4 MG/DL (ref 8.8–10.2)
CHLORIDE SERPL-SCNC: 101 MMOL/L (ref 98–107)
CHOLEST SERPL-MCNC: 191 MG/DL
CREAT SERPL-MCNC: 1.05 MG/DL (ref 0.67–1.17)
DEPRECATED HCO3 PLAS-SCNC: 29 MMOL/L (ref 22–29)
ERYTHROCYTE [DISTWIDTH] IN BLOOD BY AUTOMATED COUNT: 12.9 % (ref 10–15)
GFR SERPL CREATININE-BSD FRML MDRD: 75 ML/MIN/1.73M2
GLUCOSE SERPL-MCNC: 100 MG/DL (ref 70–99)
HBA1C MFR BLD: 4.8 % (ref 0–5.6)
HCT VFR BLD AUTO: 37.4 % (ref 40–53)
HDLC SERPL-MCNC: 40 MG/DL
HGB BLD-MCNC: 12.6 G/DL (ref 13.3–17.7)
LDLC SERPL CALC-MCNC: 131 MG/DL
MCH RBC QN AUTO: 31.6 PG (ref 26.5–33)
MCHC RBC AUTO-ENTMCNC: 33.7 G/DL (ref 31.5–36.5)
MCV RBC AUTO: 94 FL (ref 78–100)
NONHDLC SERPL-MCNC: 151 MG/DL
PLATELET # BLD AUTO: 308 10E3/UL (ref 150–450)
POTASSIUM SERPL-SCNC: 4.8 MMOL/L (ref 3.4–5.3)
PSA SERPL DL<=0.01 NG/ML-MCNC: 4.61 NG/ML (ref 0–6.5)
RBC # BLD AUTO: 3.99 10E6/UL (ref 4.4–5.9)
SODIUM SERPL-SCNC: 139 MMOL/L (ref 136–145)
TRIGL SERPL-MCNC: 99 MG/DL
WBC # BLD AUTO: 4.9 10E3/UL (ref 4–11)

## 2023-03-13 PROCEDURE — G0009 ADMIN PNEUMOCOCCAL VACCINE: HCPCS | Performed by: FAMILY MEDICINE

## 2023-03-13 PROCEDURE — 80061 LIPID PANEL: CPT | Performed by: FAMILY MEDICINE

## 2023-03-13 PROCEDURE — 36415 COLL VENOUS BLD VENIPUNCTURE: CPT | Performed by: FAMILY MEDICINE

## 2023-03-13 PROCEDURE — G0439 PPPS, SUBSEQ VISIT: HCPCS | Performed by: FAMILY MEDICINE

## 2023-03-13 PROCEDURE — 80048 BASIC METABOLIC PNL TOTAL CA: CPT | Performed by: FAMILY MEDICINE

## 2023-03-13 PROCEDURE — 99204 OFFICE O/P NEW MOD 45 MIN: CPT | Mod: 25 | Performed by: FAMILY MEDICINE

## 2023-03-13 PROCEDURE — G0103 PSA SCREENING: HCPCS | Performed by: FAMILY MEDICINE

## 2023-03-13 PROCEDURE — 90677 PCV20 VACCINE IM: CPT | Performed by: FAMILY MEDICINE

## 2023-03-13 PROCEDURE — 83036 HEMOGLOBIN GLYCOSYLATED A1C: CPT | Performed by: FAMILY MEDICINE

## 2023-03-13 PROCEDURE — 85027 COMPLETE CBC AUTOMATED: CPT | Performed by: FAMILY MEDICINE

## 2023-03-13 RX ORDER — LISINOPRIL/HYDROCHLOROTHIAZIDE 10-12.5 MG
1 TABLET ORAL DAILY
Qty: 90 TABLET | Refills: 1 | Status: SHIPPED | OUTPATIENT
Start: 2023-03-13 | End: 2023-04-08

## 2023-03-13 RX ORDER — ATORVASTATIN CALCIUM 20 MG/1
20 TABLET, FILM COATED ORAL DAILY
Qty: 90 TABLET | Refills: 1 | Status: SHIPPED | OUTPATIENT
Start: 2023-03-13 | End: 2023-09-07

## 2023-03-13 RX ORDER — LISINOPRIL AND HYDROCHLOROTHIAZIDE 20; 25 MG/1; MG/1
0.5 TABLET ORAL DAILY
Qty: 90 TABLET | Refills: 1 | Status: SHIPPED | OUTPATIENT
Start: 2023-03-13 | End: 2023-03-13

## 2023-03-13 ASSESSMENT — ACTIVITIES OF DAILY LIVING (ADL): CURRENT_FUNCTION: NO ASSISTANCE NEEDED

## 2023-03-13 ASSESSMENT — PAIN SCALES - GENERAL: PAINLEVEL: NO PAIN (0)

## 2023-03-13 NOTE — PROGRESS NOTES
"SUBJECTIVE:   Duane is a 73 year old who presents for Preventive Visit.  Patient has been advised of split billing requirements and indicates understanding: Yes  Are you in the first 12 months of your Medicare coverage?  No    Healthy Habits:     In general, how would you rate your overall health?  Good    Frequency of exercise:  4-5 days/week    Duration of exercise:  30-45 minutes    Do you usually eat at least 4 servings of fruit and vegetables a day, include whole grains    & fiber and avoid regularly eating high fat or \"junk\" foods?  No    Taking medications regularly:  Not Applicable    Medication side effects:  Not applicable    Ability to successfully perform activities of daily living:  No assistance needed    Home Safety:  No safety concerns identified    Hearing Impairment:  No hearing concerns    In the past 6 months, have you been bothered by leaking of urine?  No    In general, how would you rate your overall mental or emotional health?  Excellent      PHQ-2 Total Score: 0    Additional concerns today:  No      Have you ever done Advance Care Planning? (For example, a Health Directive, POLST, or a discussion with a medical provider or your loved ones about your wishes): No, advance care planning information given to patient to review.  Patient plans to discuss their wishes with loved ones or provider.         Fall risk  Fallen 2 or more times in the past year?: No  Any fall with injury in the past year?: No    Cognitive Screening   1) Repeat 3 items (Leader, Season, Table)    2) Clock draw: NORMAL  3) 3 item recall: Recalls 1 object   Results: NORMAL clock, 1-2 items recalled: COGNITIVE IMPAIRMENT LESS LIKELY    Mini-CogTM Copyright ED Chavez. Licensed by the author for use in [facility]; reprinted with permission (sugey@.[facility]). All rights reserved.          Reviewed and updated as needed this visit by clinical staff   Tobacco  Allergies  Meds              Reviewed and updated as needed " "this visit by Provider                 Social History     Tobacco Use     Smoking status: Every Day     Packs/day: 0.25     Types: Cigarettes     Smokeless tobacco: Never   Substance Use Topics     Alcohol use: Yes     Comment: occas         Alcohol Use 3/13/2023   Prescreen: >3 drinks/day or >7 drinks/week? No         PROBLEMS TO ADD ON...  Blood pressure - 140-160/80-90 when he takes it at home     Current providers sharing in care for this patient include:   Patient Care Team:  Jose Coles MD as PCP - General (Family Practice)    The following health maintenance items are reviewed in Epic and correct as of today:  Health Maintenance   Topic Date Due     ADVANCE CARE PLANNING  Never done     HEPATITIS C SCREENING  Never done     DTAP/TDAP/TD IMMUNIZATION (1 - Tdap) Never done     ZOSTER IMMUNIZATION (1 of 2) Never done     LUNG CANCER SCREENING  Never done     Pneumococcal Vaccine: 65+ Years (2 - PCV) 05/02/2012     AORTIC ANEURYSM SCREENING (SYSTEM ASSIGNED)  Never done     COLORECTAL CANCER SCREENING  08/19/2018     MEDICARE ANNUAL WELLNESS VISIT  03/13/2024     ANNUAL REVIEW OF HM ORDERS  03/13/2024     FALL RISK ASSESSMENT  03/13/2024     LIPID  04/22/2024     PHQ-2 (once per calendar year)  Completed     INFLUENZA VACCINE  Completed     COVID-19 Vaccine  Completed     IPV IMMUNIZATION  Aged Out     MENINGITIS IMMUNIZATION  Aged Out           Review of Systems  Constitutional, HEENT, cardiovascular, pulmonary, GI, , musculoskeletal, neuro, skin, endocrine and psych systems are negative, except as otherwise noted.    OBJECTIVE:   BP (!) 150/98 (Cuff Size: Adult Regular)   Pulse 59   Temp 97.8  F (36.6  C) (Tympanic)   Resp 18   Ht 1.676 m (5' 6\")   Wt 67.1 kg (148 lb)   SpO2 98%   BMI 23.89 kg/m   Estimated body mass index is 23.89 kg/m  as calculated from the following:    Height as of this encounter: 1.676 m (5' 6\").    Weight as of this encounter: 67.1 kg (148 lb).  Physical Exam  GENERAL: " "healthy, alert and no distress  EYES: Eyes grossly normal to inspection, PERRL and conjunctivae and sclerae normal  HENT: nose and mouth without ulcers or lesions  NECK: no adenopathy, no asymmetry, masses, or scars and thyroid normal to palpation  RESP: lungs clear to auscultation - no rales, rhonchi or wheezes  CV: regular rate and rhythm, normal S1 S2, no S3 or S4, no murmur, click or rub, no peripheral edema and peripheral pulses strong  ABDOMEN: soft, nontender, without hepatosplenomegaly or masses, bowel sounds normal and umbilicus normal   (male): normal male genitalia without lesions or urethral discharge, no hernia palpated  MS: no gross musculoskeletal defects noted, no edema  SKIN: no suspicious lesions or rashes  NEURO:  mentation intact and speech normal  PSYCH: mentation appears normal, affect normal/bright    Please call  [phone] to schedule imaging.   Please call [phone] to schedule colonoscopy.  ASSESSMENT / PLAN:   (Z00.00) Encounter for Medicare annual wellness exam  (primary encounter diagnosis)  Comment: Seen for annual visit today. He reports L inguinal hernia present x 1.5 years. Physical exam as described with no pertinent findings. Discussed differentials and recommended US imaging, as ordered. Reviewed medications today, refills sent. He is taking 325mg Aspirin twice daily for \"heart arrhythmia,\" recommended he take 81mg once daily. Immunizations addressed today. Recommended daily walking, maintaining healthy diet, and smoking cessation.   Plan: REVIEW OF HEALTH MAINTENANCE PROTOCOL ORDERS            (R19.09) Groin swelling  Comment: Began after lifting a heavy box about a year and a half ago. No pain, urinary symptoms or bowel changes. Wears a hernia belt daily. Unable to palpate on exam today. US ordered to confirm. Will discuss need for general surgery referral at that time.   Plan: US Hernia Evaluation            (Z12.11) Special screening for malignant neoplasms, " "colon  Comment: Asymptomatic. No family history.  Plan: Colonoscopy Screening  Referral           (I10) Benign essential hypertension  Comment: Stopped taking Zestoretic \"at the time of Covid.\" Not regularly checking blood pressure at home. Restart Zestoretic today.  Plan: CBC with platelets, Basic metabolic panel  (Ca,        Cl, CO2, Creat, Gluc, K, Na, BUN), Lipid panel,        Basic metabolic panel  (Ca, Cl, CO2, Creat,         Gluc, K, Na, BUN), aspirin (ASA) 81 MG EC         tablet, lisinopril-hydrochlorothiazide         (ZESTORETIC) 10-12.5 MG tablet, DISCONTINUED:         lisinopril-hydrochlorothiazide (ZESTORETIC)         20-25 MG tablet            (Z13.1) Screening for diabetes mellitus  Comment: No family history  Plan: Hemoglobin A1c           (R79.9) Abnormal finding of blood chemistry, unspecified  Comment:   Plan: Hemoglobin A1c           (E78.5) Hyperlipidemia LDL goal <130  Comment: Recommended daily walking and healthy diet. Restart Atorvastatin today.   Plan: atorvastatin (LIPITOR) 20 MG tablet            (F17.210) Cigarette nicotine dependence without complication  Comment: Recommended quitting and referral for quit line, declines today  Plan: CT Chest Lung Cancer Scrn Low Dose wo            (Z12.5) Screening for prostate cancer  Comment: No family history. No lower urinary tract symptoms  Plan: PSA, screen          (Z13.6) Screening for AAA (abdominal aortic aneurysm)  Comment: Smokes 4-6 \"self-rolled\" cigarettes per day with no plan for quitting  Plan: US Abdominal Aorta Imaging            (Z23) Need for vaccination  Comment: Completed today  Plan: Pneumococcal 20 Valent Conjugate (Prevnar 20)           He reports that he has been smoking cigarettes. He has been smoking an average of .25 packs per day. He has never used smokeless tobacco.  Nicotine/Tobacco Cessation Plan:   Information offered: Patient not interested at this time      Appropriate preventive services were discussed with " this patient, including applicable screening as appropriate for cardiovascular disease, diabetes, osteopenia/osteoporosis, and glaucoma.  As appropriate for age/gender, discussed screening for colorectal cancer, prostate cancer, breast cancer, and cervical cancer. Checklist reviewing preventive services available has been given to the patient.        Davey Randolph MD  [facility]    Identified Health Risks:    The patient was counseled and encouraged to consider modifying their diet and eating habits. He was provided with information on recommended healthy diet options.

## 2023-03-13 NOTE — NURSING NOTE
"Chief Complaint   Patient presents with     Physical     BP (!) 150/98 (Cuff Size: Adult Regular)   Pulse 59   Temp 97.8  F (36.6  C) (Tympanic)   Resp 18   Ht 1.676 m (5' 6\")   Wt 67.1 kg (148 lb)   SpO2 98%   BMI 23.89 kg/m   Estimated body mass index is 23.89 kg/m  as calculated from the following:    Height as of this encounter: 1.676 m (5' 6\").    Weight as of this encounter: 67.1 kg (148 lb).  Patient presents to the clinic using No DME      Health Maintenance that is potentially due pending provider review:    Health Maintenance Due   Topic Date Due     ADVANCE CARE PLANNING  Never done     HEPATITIS C SCREENING  Never done     DTAP/TDAP/TD IMMUNIZATION (1 - Tdap) Never done     ZOSTER IMMUNIZATION (1 of 2) Never done     LUNG CANCER SCREENING  Never done     Pneumococcal Vaccine: 65+ Years (2 - PCV) 05/02/2012     AORTIC ANEURYSM SCREENING (SYSTEM ASSIGNED)  Never done     COLORECTAL CANCER SCREENING  08/19/2018                "

## 2023-03-13 NOTE — PATIENT INSTRUCTIONS
Patient Education   Personalized Prevention Plan  You are due for the preventive services outlined below.  Your care team is available to assist you in scheduling these services.  If you have already completed any of these items, please share that information with your care team to update in your medical record.  Health Maintenance Due   Topic Date Due     ANNUAL REVIEW OF HM ORDERS  Never done     Discuss Advance Care Planning  Never done     Hepatitis C Screening  Never done     Diptheria Tetanus Pertussis (DTAP/TDAP/TD) Vaccine (1 - Tdap) Never done     Zoster (Shingles) Vaccine (1 of 2) Never done     LUNG CANCER SCREENING  Never done     Pneumococcal Vaccine (2 - PCV) 05/02/2012     AORTIC ANEURYSM SCREENING (SYSTEM ASSIGNED)  Never done     Colorectal Cancer Screening  08/19/2018       Understanding USDA MyPlate  The USDA has guidelines to help you make healthy food choices. These are called MyPlate. MyPlate shows the food groups that make up healthy meals using the image of a place setting. Before you eat, think about the healthiest choices for what to put on your plate or in your cup or bowl. To learn more about building a healthy plate, visit www.choosemyplate.gov.    The food groups    Fruits. Any fruit or 100% fruit juice counts as part of the Fruit Group. Fruits may be fresh, canned, frozen, or dried, and may be whole, cut-up, or pureed. Make 1/2 of your plate fruits and vegetables.    Vegetables. Any vegetable or 100% vegetable juice counts as a member of the Vegetable Group. Vegetables may be fresh, frozen, canned, or dried. They can be served raw or cooked and may be whole, cut-up, or mashed. Make 1/2 of your plate fruits and vegetables.    Grains. All foods made from grains are part of the Grains Group. These include wheat, rice, oats, cornmeal, and barley. Grains are often used to make foods such as bread, pasta, oatmeal, cereal, tortillas, and grits. Grains should be no more than 1/4 of your  plate. At least half of your grains should be whole grains.    Protein. This group includes meat, poultry, seafood, beans and peas, eggs, processed soy products (such as tofu), nuts (including nut butters), and seeds. Make protein choices no more than 1/4 of your plate. Meat and poultry choices should be lean or low fat.    Dairy. The Dairy Group includes all fluid milk products and foods made from milk that contain calcium, such as yogurt and cheese. (Foods that have little calcium, such as cream, butter, and cream cheese, are not part of this group.) Most dairy choices should be low-fat or fat-free.    Oils. Oils aren't a food group, but they do contain essential nutrients. However it's important to watch your intake of oils. These are fats that are liquid at room temperature. They include canola, corn, olive, soybean, vegetable, and sunflower oil. Foods that are mainly oil include mayonnaise, certain salad dressings, and soft margarines. You likely already get your daily oil allowance from the foods you eat.  Things to limit  Eating healthy also means limiting these things in your diet:       Salt (sodium). Many processed foods have a lot of sodium. To keep sodium intake down, eat fresh vegetables, meats, poultry, and seafood when possible. Purchase low-sodium, reduced-sodium, or no-salt-added food products at the store. And don't add salt to your meals at home. Instead, season them with herbs and spices such as dill, oregano, cumin, and paprika. Or try adding flavor with lemon or lime zest and juice.    Saturated fat. Saturated fats are most often found in animal products such as beef, pork, and chicken. They are often solid at room temperature, such as butter. To reduce your saturated fat intake, choose leaner cuts of meat and poultry. And try healthier cooking methods such as grilling, broiling, roasting, or baking. For a simple lower-fat swap, use plain nonfat yogurt instead of mayonnaise when making potato  salad or macaroni salad.    Added sugars. These are sugars added to foods. They are in foods such as ice cream, candy, soda, fruit drinks, sports drinks, energy drinks, cookies, pastries, jams, and syrups. Cut down on added sugars by sharing sweet treats with a family member or friend. You can also choose fruit for dessert, and drink water or other unsweetened beverages.     [employer] last reviewed this educational content on 6/1/2020 2000-2021 [employer]. All rights reserved. This information is not intended as a substitute for professional medical care. Always follow your healthcare professional's instructions.

## 2023-03-15 ENCOUNTER — HOSPITAL ENCOUNTER (OUTPATIENT)
Dept: ULTRASOUND IMAGING | Facility: CLINIC | Age: 73
Discharge: HOME | End: 2023-03-15
Attending: FAMILY MEDICINE
Payer: MEDICARE

## 2023-03-15 ENCOUNTER — HOSPITAL ENCOUNTER (OUTPATIENT)
Dept: CT IMAGING | Facility: CLINIC | Age: 73
Discharge: HOME | End: 2023-03-15
Attending: FAMILY MEDICINE
Payer: MEDICARE

## 2023-03-15 DIAGNOSIS — R19.09 GROIN SWELLING: ICD-10-CM

## 2023-03-15 DIAGNOSIS — F17.210 CIGARETTE NICOTINE DEPENDENCE WITHOUT COMPLICATION: ICD-10-CM

## 2023-03-15 DIAGNOSIS — Z13.6 SCREENING FOR AAA (ABDOMINAL AORTIC ANEURYSM): ICD-10-CM

## 2023-03-15 PROCEDURE — 76705 ECHO EXAM OF ABDOMEN: CPT

## 2023-03-15 PROCEDURE — 76775 US EXAM ABDO BACK WALL LIM: CPT | Mod: 59

## 2023-03-15 PROCEDURE — 71271 CT THORAX LUNG CANCER SCR C-: CPT

## 2023-03-16 DIAGNOSIS — K40.90 UNILATERAL INGUINAL HERNIA WITHOUT OBSTRUCTION OR GANGRENE, RECURRENCE NOT SPECIFIED: Primary | ICD-10-CM

## 2023-03-18 ENCOUNTER — LAB (OUTPATIENT)
Dept: LAB | Facility: CLINIC | Age: 73
End: 2023-03-18
Payer: MEDICARE

## 2023-03-18 DIAGNOSIS — D64.9 ANEMIA, UNSPECIFIED TYPE: ICD-10-CM

## 2023-03-18 LAB
FERRITIN SERPL-MCNC: 19 NG/ML (ref 31–409)
FOLATE SERPL-MCNC: >40 NG/ML (ref 4.6–34.8)
IRON BINDING CAPACITY (ROCHE): 314 UG/DL (ref 240–430)
IRON SATN MFR SERPL: 13 % (ref 15–46)
IRON SERPL-MCNC: 42 UG/DL (ref 61–157)
VIT B12 SERPL-MCNC: 518 PG/ML (ref 232–1245)

## 2023-03-18 PROCEDURE — 82607 VITAMIN B-12: CPT

## 2023-03-18 PROCEDURE — 83550 IRON BINDING TEST: CPT

## 2023-03-18 PROCEDURE — 82746 ASSAY OF FOLIC ACID SERUM: CPT

## 2023-03-18 PROCEDURE — 36415 COLL VENOUS BLD VENIPUNCTURE: CPT

## 2023-03-18 PROCEDURE — 83540 ASSAY OF IRON: CPT

## 2023-03-18 PROCEDURE — 82728 ASSAY OF FERRITIN: CPT

## 2023-03-19 DIAGNOSIS — D64.9 ANEMIA, UNSPECIFIED TYPE: Primary | ICD-10-CM

## 2023-03-19 DIAGNOSIS — E61.1 IRON DEFICIENCY: ICD-10-CM

## 2023-03-19 NOTE — PROGRESS NOTES
Lab results discussed with patient, consistent with iron deficiency anemia.  Patient declined any abdominal pain, abnormal bleeding or other relevant systemic symptoms.      Recommended to schedule colonoscopy and start iron supplement regularly.  Will repeat CBC and iron stores in 2 to 3 months, order placed, patient, spouse understood and in agreement with above plan.  All questions answered.    Dr Randolph

## 2023-03-20 ENCOUNTER — TELEPHONE (OUTPATIENT)
Dept: SURGERY | Facility: CLINIC | Age: 73
End: 2023-03-20
Payer: MEDICARE

## 2023-03-20 RX ORDER — MULTIVIT WITH MINERALS/LUTEIN
1000 TABLET ORAL 2 TIMES DAILY
Status: ON HOLD | COMMUNITY
Start: 2010-05-06 | End: 2024-09-06

## 2023-03-20 RX ORDER — BISACODYL 5 MG/1
TABLET, DELAYED RELEASE ORAL
Qty: 4 TABLET | Refills: 0 | Status: SHIPPED | OUTPATIENT
Start: 2023-03-20 | End: 2023-04-06

## 2023-03-20 NOTE — TELEPHONE ENCOUNTER
Screening Questions  BLUE  KIND OF PREP RED  LOCATION [review exclusion criteria] GREEN  SEDATION TYPE        N Are you active on mychart?       Tomasa Ordering/Referring Provider?        Medicare/[employer] What type of coverage do you have?      N Have you had a positive covid test in the last 14 days?     23.89 1. BMI  [BMI 40+ - review exclusion criteria]    Y  2. Are you able to give consent for your medical care? [IF NO,RN REVIEW]          N  3. Are you taking any prescription pain medications on a routine schedule   (ex narcotics: oxycodone, roxicodone, oxycontin,  and percocet)? [RN Review]        N  3a. EXTENDED PREP What kind of prescription?     N 4. Do you have any chemical dependencies such as alcohol, street drugs, or methadone?        **If yes 3- 5 , please schedule with MAC sedation.**          IF YES TO ANY 6 - 10 - HOSPITAL SETTING ONLY.     N 6.   Do you need assistance transferring?     N 7.   Have you had a heart or lung transplant?    N 8.   Are you currently on dialysis?   N 9.   Do you use daily home oxygen?   N 10. Do you take nitroglycerin?   10a. N If yes, how often?     11. [FEMALES]  N Are you currently pregnant?    11a. N If yes, how many weeks? [ Greater than 12 weeks, OR NEEDED]    N 12. Do you have Pulmonary Hypertension? *NEED PAC APPT AT [facility] w/ MAC*     N 13. [review exclusion criteria]  Do you have any implantable devices in your body (pacemaker, defib, LVAD)?    N 14. In the past 6 months, have you had any heart related issues including cardiomyopathy or heart attack?     14a. N If yes, did it require cardiac stenting if so when?     N 15. Have you had a stroke or Transient ischemic attack (TIA - aka  mini stroke ) within 6 months?      N 16. Do you have mod to severe Obstructive Sleep Apnea?  [Hospital only]    N 17. Do you have SEVERE AND UNCONTROLLED asthma? *NEED PAC APPT AT [facility] w/MAC*     18. Are you currently taking any blood thinners?     18a. No. Continue to  "19.   18b. Yes/no Blood Thinner: No [CONTINUE TO #19]    N 19. Do you take the medication Phentermine?    19a. If yes, \"Hold for 7 days before procedure.  Please consult your prescribing provider if you have questions about holding this medication.\"     N  20. Do you have chronic kidney disease?      N  21. Do you have a diagnosis of diabetes?     N  22. On a regular basis do you go 3-5 days between bowel movements?     See below 23. Preferred LOCAL Pharmacy for Pre Prescription    [ LIST ONLY ONE PHARMACY]     [facility] [address]        - CLOSING REMINDERS -    Informed patient they will need an adult    Cannot take any type of public or medical transportation alone    Conscious Sedation- Needs  for 6 hours after the procedure       MAC/General-Needs  for 24 hours after procedure    Pre-Procedure Covid test to be completed [[facility] PCR Testing Required]    Confirmed Nurse will call to complete assessment       - SCHEDULING DETAILS -  N Hospital Setting Required? If yes, what is the exclusion?: FRANCO Faustin  Surgeon    3-23-23  Date of Procedure  Lower Endoscopy [Colonoscopy]  Type of Procedure [place]uled  [facility] [place] answer yes to questions #8, #20, #21Which Colonoscopy Prep was Sent?     GEN Sedation Type     N PAC / Pre-op Required                 "

## 2023-03-22 ENCOUNTER — ANESTHESIA EVENT (OUTPATIENT)
Dept: GASTROENTEROLOGY | Facility: CLINIC | Age: 73
End: 2023-03-22
Payer: MEDICARE

## 2023-03-22 NOTE — ANESTHESIA PREPROCEDURE EVALUATION
Anesthesia Pre-Procedure Evaluation    Patient: Duane C Johnson   MRN: [id number] : 1950        Procedure : Procedure(s):  Colonoscopy          Past Medical History:   Diagnosis Date     Hypertension       No past surgical history on file.   No Known Allergies   Social History     Tobacco Use     Smoking status: Every Day     Packs/day: 0.25     Types: Cigarettes     Smokeless tobacco: Never   Substance Use Topics     Alcohol use: Yes     Comment: occas      Wt Readings from Last 1 Encounters:   23 67.1 kg (148 lb)        Anesthesia Evaluation            ROS/MED HX  ENT/Pulmonary:  - neg pulmonary ROS   (+) tobacco use, Current use,     Neurologic:  - neg neurologic ROS     Cardiovascular:     (+) Dyslipidemia hypertension-----    METS/Exercise Tolerance:     Hematologic:  - neg hematologic  ROS     Musculoskeletal:       GI/Hepatic:  - neg GI/hepatic ROS     Renal/Genitourinary:       Endo:       Psychiatric/Substance Use:  - neg psychiatric ROS     Infectious Disease:       Malignancy:       Other:            Physical Exam    Airway  airway exam normal      Mallampati: I   TM distance: > 3 FB   Neck ROM: full     Respiratory Devices and Support         Dental       (+) Modest Abnormalities - crowns, retainers, 1 or 2 missing teeth      Cardiovascular          Rhythm and rate: regular and normal     Pulmonary           breath sounds clear to auscultation           OUTSIDE LABS:  CBC:   Lab Results   Component Value Date    WBC 4.9 2023    WBC 12.2 (H) 2021    HGB 12.6 (L) 2023    HGB 12.0 (L) 2021    HCT 37.4 (L) 2023    HCT 34.8 (L) 2021     2023     2021     BMP:   Lab Results   Component Value Date     2023     2021    POTASSIUM 4.8 2023    POTASSIUM 4.5 2021    CHLORIDE 101 2023    CHLORIDE 105 2021    CO2 29 2023    CO2 29 2021    BUN 7.3 (L) 2023    BUN 41 (H)  05/26/2021    CR 1.05 03/13/2023    CR 0.80 05/26/2021     (H) 03/13/2023     (H) 05/26/2021     COAGS:   Lab Results   Component Value Date    INR 1.15 (H) 05/26/2021     POC: No results found for: BGM, HCG, HCGS  HEPATIC:   Lab Results   Component Value Date    ALBUMIN 3.7 03/20/2018    PROTTOTAL 7.2 03/20/2018    ALT 22 03/20/2018    AST 20 03/20/2018    ALKPHOS 82 03/20/2018    BILITOTAL 0.5 03/20/2018     OTHER:   Lab Results   Component Value Date    A1C 4.8 03/13/2023    PRANAV 10.4 (H) 03/13/2023       Anesthesia Plan    ASA Status:  2   NPO Status:  NPO Appropriate    Anesthesia Type: General.     - Airway: Native airway   Induction: Propofol.   Maintenance: TIVA.        Consents    Anesthesia Plan(s) and associated risks, benefits, and realistic alternatives discussed. Questions answered and patient/representative(s) expressed understanding.     - Discussed: Risks, Benefits and Alternatives for BOTH SEDATION and the PROCEDURE were discussed     - Discussed with:  Patient      - Extended Intubation/Ventilatory Support Discussed: No.      - Patient is DNR/DNI Status: No    Use of blood products discussed: No .     Postoperative Care            Comments:                NADEGE Nevarez CRNA

## 2023-03-23 ENCOUNTER — ANESTHESIA (OUTPATIENT)
Dept: GASTROENTEROLOGY | Facility: CLINIC | Age: 73
End: 2023-03-23
Payer: MEDICARE

## 2023-03-23 ENCOUNTER — HOSPITAL ENCOUNTER (OUTPATIENT)
Facility: CLINIC | Age: 73
Discharge: HOME | End: 2023-03-23
Attending: SURGERY | Admitting: SURGERY
Payer: MEDICARE

## 2023-03-23 VITALS
RESPIRATION RATE: 16 BRPM | WEIGHT: 148 LBS | HEART RATE: 50 BPM | DIASTOLIC BLOOD PRESSURE: 94 MMHG | TEMPERATURE: 97 F | OXYGEN SATURATION: 99 % | HEIGHT: 66 IN | SYSTOLIC BLOOD PRESSURE: 162 MMHG | BODY MASS INDEX: 23.78 KG/M2

## 2023-03-23 DIAGNOSIS — I10 BENIGN ESSENTIAL HYPERTENSION: Primary | ICD-10-CM

## 2023-03-23 LAB — COLONOSCOPY: NORMAL

## 2023-03-23 PROCEDURE — 250N000009 HC RX 250: Performed by: NURSE ANESTHETIST, CERTIFIED REGISTERED

## 2023-03-23 PROCEDURE — 88305 TISSUE EXAM BY PATHOLOGIST: CPT | Mod: TC | Performed by: SURGERY

## 2023-03-23 PROCEDURE — 45385 COLONOSCOPY W/LESION REMOVAL: CPT | Performed by: SURGERY

## 2023-03-23 PROCEDURE — 258N000003 HC RX IP 258 OP 636: Performed by: NURSE ANESTHETIST, CERTIFIED REGISTERED

## 2023-03-23 PROCEDURE — 370N000017 HC ANESTHESIA TECHNICAL FEE, PER MIN: Performed by: SURGERY

## 2023-03-23 PROCEDURE — 258N000003 HC RX IP 258 OP 636: Performed by: SURGERY

## 2023-03-23 PROCEDURE — 250N000011 HC RX IP 250 OP 636: Performed by: NURSE ANESTHETIST, CERTIFIED REGISTERED

## 2023-03-23 RX ORDER — ONDANSETRON 4 MG/1
4 TABLET, ORALLY DISINTEGRATING ORAL EVERY 6 HOURS PRN
Status: DISCONTINUED | OUTPATIENT
Start: 2023-03-23 | End: 2023-03-23 | Stop reason: HOSPADM

## 2023-03-23 RX ORDER — NALOXONE HYDROCHLORIDE 0.4 MG/ML
0.2 INJECTION, SOLUTION INTRAMUSCULAR; INTRAVENOUS; SUBCUTANEOUS
Status: DISCONTINUED | OUTPATIENT
Start: 2023-03-23 | End: 2023-03-23 | Stop reason: HOSPADM

## 2023-03-23 RX ORDER — NALOXONE HYDROCHLORIDE 0.4 MG/ML
0.4 INJECTION, SOLUTION INTRAMUSCULAR; INTRAVENOUS; SUBCUTANEOUS
Status: DISCONTINUED | OUTPATIENT
Start: 2023-03-23 | End: 2023-03-23 | Stop reason: HOSPADM

## 2023-03-23 RX ORDER — OXYCODONE HYDROCHLORIDE 5 MG/1
5 TABLET ORAL
Status: CANCELLED | OUTPATIENT
Start: 2023-03-23

## 2023-03-23 RX ORDER — HYDROMORPHONE HCL IN WATER/PF 6 MG/30 ML
0.2 PATIENT CONTROLLED ANALGESIA SYRINGE INTRAVENOUS EVERY 5 MIN PRN
Status: DISCONTINUED | OUTPATIENT
Start: 2023-03-23 | End: 2023-03-23 | Stop reason: HOSPADM

## 2023-03-23 RX ORDER — HYDROMORPHONE HCL IN WATER/PF 6 MG/30 ML
0.4 PATIENT CONTROLLED ANALGESIA SYRINGE INTRAVENOUS EVERY 5 MIN PRN
Status: DISCONTINUED | OUTPATIENT
Start: 2023-03-23 | End: 2023-03-23 | Stop reason: HOSPADM

## 2023-03-23 RX ORDER — ONDANSETRON 4 MG/1
4 TABLET, ORALLY DISINTEGRATING ORAL EVERY 30 MIN PRN
Status: DISCONTINUED | OUTPATIENT
Start: 2023-03-23 | End: 2023-03-23 | Stop reason: HOSPADM

## 2023-03-23 RX ORDER — FENTANYL CITRATE 50 UG/ML
50 INJECTION, SOLUTION INTRAMUSCULAR; INTRAVENOUS EVERY 5 MIN PRN
Status: DISCONTINUED | OUTPATIENT
Start: 2023-03-23 | End: 2023-03-23 | Stop reason: HOSPADM

## 2023-03-23 RX ORDER — SODIUM CHLORIDE, SODIUM LACTATE, POTASSIUM CHLORIDE, CALCIUM CHLORIDE 600; 310; 30; 20 MG/100ML; MG/100ML; MG/100ML; MG/100ML
INJECTION, SOLUTION INTRAVENOUS CONTINUOUS
Status: DISCONTINUED | OUTPATIENT
Start: 2023-03-23 | End: 2023-03-23 | Stop reason: HOSPADM

## 2023-03-23 RX ORDER — ONDANSETRON 2 MG/ML
4 INJECTION INTRAMUSCULAR; INTRAVENOUS EVERY 30 MIN PRN
Status: DISCONTINUED | OUTPATIENT
Start: 2023-03-23 | End: 2023-03-23 | Stop reason: HOSPADM

## 2023-03-23 RX ORDER — PROPOFOL 10 MG/ML
INJECTION, EMULSION INTRAVENOUS CONTINUOUS PRN
Status: DISCONTINUED | OUTPATIENT
Start: 2023-03-23 | End: 2023-03-23

## 2023-03-23 RX ORDER — FENTANYL CITRATE 50 UG/ML
25 INJECTION, SOLUTION INTRAMUSCULAR; INTRAVENOUS EVERY 5 MIN PRN
Status: DISCONTINUED | OUTPATIENT
Start: 2023-03-23 | End: 2023-03-23 | Stop reason: HOSPADM

## 2023-03-23 RX ORDER — LIDOCAINE 40 MG/G
CREAM TOPICAL
Status: DISCONTINUED | OUTPATIENT
Start: 2023-03-23 | End: 2023-03-23 | Stop reason: HOSPADM

## 2023-03-23 RX ORDER — LIDOCAINE HYDROCHLORIDE 20 MG/ML
INJECTION, SOLUTION INFILTRATION; PERINEURAL PRN
Status: DISCONTINUED | OUTPATIENT
Start: 2023-03-23 | End: 2023-03-23

## 2023-03-23 RX ORDER — ONDANSETRON 2 MG/ML
4 INJECTION INTRAMUSCULAR; INTRAVENOUS EVERY 6 HOURS PRN
Status: DISCONTINUED | OUTPATIENT
Start: 2023-03-23 | End: 2023-03-23 | Stop reason: HOSPADM

## 2023-03-23 RX ORDER — OXYCODONE HYDROCHLORIDE 5 MG/1
10 TABLET ORAL
Status: CANCELLED | OUTPATIENT
Start: 2023-03-23

## 2023-03-23 RX ORDER — PROCHLORPERAZINE MALEATE 5 MG
5 TABLET ORAL EVERY 6 HOURS PRN
Status: DISCONTINUED | OUTPATIENT
Start: 2023-03-23 | End: 2023-03-23 | Stop reason: HOSPADM

## 2023-03-23 RX ORDER — FLUMAZENIL 0.1 MG/ML
0.2 INJECTION, SOLUTION INTRAVENOUS
Status: DISCONTINUED | OUTPATIENT
Start: 2023-03-23 | End: 2023-03-23 | Stop reason: HOSPADM

## 2023-03-23 RX ADMIN — SODIUM CHLORIDE, POTASSIUM CHLORIDE, SODIUM LACTATE AND CALCIUM CHLORIDE: 600; 310; 30; 20 INJECTION, SOLUTION INTRAVENOUS at 11:21

## 2023-03-23 RX ADMIN — LIDOCAINE HYDROCHLORIDE 50 MG: 20 INJECTION, SOLUTION INFILTRATION; PERINEURAL at 11:35

## 2023-03-23 RX ADMIN — PROPOFOL 150 MCG/KG/MIN: 10 INJECTION, EMULSION INTRAVENOUS at 11:35

## 2023-03-23 RX ADMIN — SODIUM CHLORIDE, POTASSIUM CHLORIDE, SODIUM LACTATE AND CALCIUM CHLORIDE: 600; 310; 30; 20 INJECTION, SOLUTION INTRAVENOUS at 11:34

## 2023-03-23 RX ADMIN — LIDOCAINE HYDROCHLORIDE 0.1 ML: 10 INJECTION, SOLUTION EPIDURAL; INFILTRATION; INTRACAUDAL; PERINEURAL at 11:22

## 2023-03-23 ASSESSMENT — LIFESTYLE VARIABLES: TOBACCO_USE: 1

## 2023-03-23 ASSESSMENT — ACTIVITIES OF DAILY LIVING (ADL)
ADLS_ACUITY_SCORE: 35
ADLS_ACUITY_SCORE: 35

## 2023-03-23 NOTE — LETTER
[facility]           [address] [place] [address]           Tel [phone]  Duane CYRUS Mingo  [address] [address] [place] [address]      March 28, 2023    Dear Duane,  This letter is to inform you of the results of your pathology report on your colonoscopy.  If you have questions please feel free to call:  Please call [phone], for  [facility] or  for [facility], to schedule a follow up appointment in 2 weeks.   Your pathology report was:  Normal, please follow up by having a repeat colonoscopy in 10 years.  If you do have further questions please don t hesitate to call the below number.    To make an appointment call:  Please call [phone], for  [facility] or  for [facility], to schedule a follow up appointment in 2 weeks.     Sincerely,     Jose Faustin M.D.  ___

## 2023-03-23 NOTE — ANESTHESIA POSTPROCEDURE EVALUATION
Patient: Duane C Johnson    Procedure: Procedure(s):  COLONOSCOPY, FLEXIBLE, WITH LESION REMOVAL USING SNARE       Anesthesia Type:  General    Note:  Disposition: Outpatient   Postop Pain Control: Uneventful            Sign Out: Well controlled pain   PONV: No   Neuro/Psych: Uneventful            Sign Out: Acceptable/Baseline neuro status   Airway/Respiratory: Uneventful            Sign Out: Acceptable/Baseline resp. status   CV/Hemodynamics: Uneventful            Sign Out: Acceptable CV status; No obvious hypovolemia; No obvious fluid overload   Other NRE: NONE   DID A NON-ROUTINE EVENT OCCUR? No           Last vitals:  Vitals Value Taken Time   /70 03/23/23 1215   Temp 36.1  C (97  F) 03/23/23 1203   Pulse 49 03/23/23 1215   Resp 16 03/23/23 1215   SpO2 99 % 03/23/23 1243   Vitals shown include unvalidated device data.    Electronically Signed By: NADEGE Olivarez CRNA  March 23, 2023  12:44 PM

## 2023-03-23 NOTE — ANESTHESIA CARE TRANSFER NOTE
Patient: Duane C Johnson    Procedure: Procedure(s):  COLONOSCOPY, FLEXIBLE, WITH LESION REMOVAL USING SNARE       Diagnosis: Iron deficiency [E61.1]  Diagnosis Additional Information: No value filed.    Anesthesia Type:   General     Note:    Oropharynx: oropharynx clear of all foreign objects  Level of Consciousness: awake  Oxygen Supplementation: room air    Independent Airway: airway patency satisfactory and stable  Dentition: dentition unchanged      Patient transferred to: Phase II    Handoff Report: Identifed the Patient, Identified the Reponsible Provider, Reviewed the pertinent medical history, Discussed the surgical course, Reviewed Intra-OP anesthesia mangement and issues during anesthesia, Set expectations for post-procedure period and Allowed opportunity for questions and acknowledgement of understanding      Vitals:  Vitals Value Taken Time   BP 98/62 03/23/23 1203   Temp     Pulse 51 03/23/23 1203   Resp     SpO2 97 % 03/23/23 1207   Vitals shown include unvalidated device data.    Electronically Signed By: NADEGE Olivarez CRNA  March 23, 2023  12:08 PM

## 2023-03-23 NOTE — H&P
ENDOSCOPY PRE-SEDATION H&P FOR OUTPATIENT PROCEDURES    Duane C Johnson  [id number]  1950    Procedure:   Colonoscopy possible biopsy, possible polypectomy, with MAC sedation.     Pre-procedure diagnosis: iron def and screen    Past medical history:   Past Medical History:   Diagnosis Date     Hypertension        Past surgical history: History reviewed. No pertinent surgical history.    Current Facility-Administered Medications   Medication     fentaNYL (PF) (SUBLIMAZE) injection 25 mcg     fentaNYL (PF) (SUBLIMAZE) injection 50 mcg     flumazenil (ROMAZICON) injection 0.2 mg     HYDROmorphone (DILAUDID) injection 0.2 mg     HYDROmorphone (DILAUDID) injection 0.4 mg     lactated ringers infusion     lactated ringers infusion     lactated ringers infusion     lidocaine (LMX4) kit     lidocaine (LMX4) kit     lidocaine 1 % 0.1-1 mL     lidocaine 1 % 0.1-1 mL     naloxone (NARCAN) injection 0.2 mg     naloxone (NARCAN) injection 0.2 mg     naloxone (NARCAN) injection 0.4 mg     naloxone (NARCAN) injection 0.4 mg     ondansetron (ZOFRAN ODT) ODT tab 4 mg    Or     ondansetron (ZOFRAN) injection 4 mg     ondansetron (ZOFRAN ODT) ODT tab 4 mg    Or     ondansetron (ZOFRAN) injection 4 mg     prochlorperazine (COMPAZINE) injection 5 mg    Or     prochlorperazine (COMPAZINE) tablet 5 mg     prochlorperazine (COMPAZINE) injection 5 mg     sodium chloride (PF) 0.9% PF flush 3 mL     sodium chloride (PF) 0.9% PF flush 3 mL     sodium chloride (PF) 0.9% PF flush 3 mL     sodium chloride (PF) 0.9% PF flush 3 mL       No Known Allergies    History of Anesthesia/Sedation Problems: no    Physical Exam:    Mental status: alert  Heart: Normal  Lung: Normal  Assessment of patient's airway: Normal  Other as pertinent for procedure: None     ASA Score: See Provation note    Mallampati score:  II - Faucial pillars and soft palate may be seen, but uvula is masked by the base of the tongue    Assessment/Plan:     The patient is an  appropriate candidate to receive sedation.    Informed consent was discussed with the patient/family, including the risks, benefits, potential complications and any alternative options associated with sedation.    Patient assessment completed just prior to sedation and while under constant observation by the provider. Condition determined to be adequate for proceeding with sedation.    The specific risks for the procedure were discussed with the patient at the time of informed consent and include but are not limited to perforation which could require surgery, missing significant neoplasm or lesion, hemorrhage and adverse sedative complication.      Jose Faustin MD

## 2023-03-27 LAB
PATH REPORT.COMMENTS IMP SPEC: NORMAL
PATH REPORT.COMMENTS IMP SPEC: NORMAL
PATH REPORT.FINAL DX SPEC: NORMAL
PATH REPORT.GROSS SPEC: NORMAL
PATH REPORT.MICROSCOPIC SPEC OTHER STN: NORMAL
PATH REPORT.RELEVANT HX SPEC: NORMAL
PHOTO IMAGE: NORMAL

## 2023-03-27 PROCEDURE — 88305 TISSUE EXAM BY PATHOLOGIST: CPT | Mod: 26 | Performed by: PATHOLOGY

## 2023-04-04 ENCOUNTER — OFFICE VISIT (OUTPATIENT)
Dept: SURGERY | Facility: CLINIC | Age: 73
End: 2023-04-04
Attending: FAMILY MEDICINE
Payer: MEDICARE

## 2023-04-04 VITALS
TEMPERATURE: 97 F | HEART RATE: 64 BPM | BODY MASS INDEX: 23.77 KG/M2 | SYSTOLIC BLOOD PRESSURE: 154 MMHG | WEIGHT: 147.93 LBS | HEIGHT: 66 IN | DIASTOLIC BLOOD PRESSURE: 87 MMHG

## 2023-04-04 DIAGNOSIS — K40.90 UNILATERAL INGUINAL HERNIA WITHOUT OBSTRUCTION OR GANGRENE, RECURRENCE NOT SPECIFIED: ICD-10-CM

## 2023-04-04 PROCEDURE — 99213 OFFICE O/P EST LOW 20 MIN: CPT | Performed by: SURGERY

## 2023-04-04 NOTE — LETTER
4/4/2023         RE: Duane C Johnson  [address] [address] [place] [address]        Dear Colleague,    Thank you for referring your patient, Duane C Johnson, to the [facility]. Please see a copy of my visit note below.    Surgical Consultation/History and Physical  [facility]    Duane is seen in consultation for Hernia, at the request of Jose Coles MD.    Chief Complaint:  Hernia    History of Present Illness: Duane C Johnson is a 73 year old male presents with hernia.  Patient noted a bulge while moving last year.  He is wearing a truss at this time.  It has improved with belt.  Denies change in bowel habits.  He denies history of hernias in past.  He does smoke daily.    Patient Active Problem List   Diagnosis     Hyperlipidemia LDL goal <130     Benign essential hypertension     Past Medical History:   Diagnosis Date     Hypertension      Past Surgical History:   Procedure Laterality Date     COLONOSCOPY N/A 3/23/2023    Procedure: COLONOSCOPY, FLEXIBLE, WITH LESION REMOVAL USING SNARE;  Surgeon: Jose Faustin MD;  Location: [facility]     Family History   Problem Relation Age of Onset     Other Cancer Mother      Cervical Cancer Sister      GI problems Father      Social History     Tobacco Use     Smoking status: Every Day     Packs/day: 0.25     Types: Cigarettes     Smokeless tobacco: Never   Vaping Use     Vaping status: Never Used   Substance Use Topics     Alcohol use: Yes     Comment: occas      History   Drug Use No     Current Outpatient Medications   Medication Sig Dispense Refill     aspirin (ASA) 81 MG EC tablet Take 1 tablet (81 mg) by mouth daily 90 tablet 3     atorvastatin (LIPITOR) 20 MG tablet Take 1 tablet (20 mg) by mouth daily 90 tablet 1     bisacodyl (DULCOLAX) 5 MG EC tablet Take 2 tablets at 3 pm the day before your procedure. If your procedure is before 11 am, take 2 additional tablets at 11 pm. If your procedure is after 11 am, take  "2 additional tablets at 6 am. For additional instructions refer to your colonoscopy prep instructions. 4 tablet 0     lisinopril-hydrochlorothiazide (ZESTORETIC) 10-12.5 MG tablet Take 1 tablet by mouth daily 90 tablet 1     Multiple Vitamins-Minerals (MULTIVITAMIN ADULT EXTRA C) CHEW        polyethylene glycol (GOLYTELY) 236 g suspension The night before the exam at 6 pm drink an 8-ounce glass every 15 minutes until the jug is half empty. If you arrive before 11 AM: Drink the other half of the Golytely jug at 11 PM night before procedure. If you arrive after 11 AM: Drink the other half of the Golytely jug at 6 AM day of procedure. For additional instructions refer to your colonoscopy prep instructions. 4000 mL 0     vitamin C (ASCORBIC ACID) 1000 MG TABS Take 1,000 mg by mouth       No Known Allergies    Physical Exam:  BP (!) 154/87 (BP Location: Right arm, Patient Position: Sitting, Cuff Size: Adult Regular)   Pulse 64   Temp 97  F (36.1  C) (Tympanic)   Ht 1.676 m (5' 5.98\")   Wt 67.1 kg (147 lb 14.9 oz)   BMI 23.89 kg/m      Constitutional- No acute distress, well nourished, non-toxic  Eyes: Anicteric, no injection.  PERRL  ENT:  Normocephalic, atraumatic, Nose midline, moist mucus membranes  Neck - supple, no LAD  Abdomen - Soft, non-tender, +BS, no hepatosplenomegaly, no palpable masses  Groins - Small left sided inguinal hernia.  No right inguinal hernia  Rectal - good tone, no masses, guaiac negative  Neuro - No focal neuro deficits, Alert and oriented x 3  Psych: Appropriate mood and affect  Musculoskeletal: Normal gait, symmetric strength.  FROM upper and lower extremities.  Skin: Warm, Dry    US Hernia:  INDICATION:  Groin swelling  COMPARISON: None.     TECHNIQUE: Transabdominal ultrasound of the groin during rest and Valsalva.     FINDINGS:      There is a fat-containing left groin hernia with the opening of the hernia measuring approximately 1.6 cm.                                                 "                      IMPRESSION:  1.  Fat-containing left inguinal hernia.    Assessment:  1. Unilateral inguinal hernia without obstruction or gangrene, recurrence not specified      Plan:   Duane C Johnson presents with asymptomatic left inguinal hernia.  He is wearing a truss at this time.  We discussed the indications for hernia repair including discomfort and enlarging size.  I reviewed the ultrasound showing a fat containing hernia.  We discussed that hernias generally increase in size.  We reviewed the KRAMES guide for hernias in detail.  I discussed open inguinal hernia repair in detail with patient.  I discussed the relative risk of strangulation and signs/symptoms of such.  After our discussion he elects to continue conservative observation.  Should he become more symptomatic he will return for surgical planning.  I reviewed smoking increases risk of hernia recurrence.  I advised optimal hernia repair would involve smoking cessation.    Jamal Gomes DO on 4/4/2023 at 10:05 AM        Again, thank you for allowing me to participate in the care of your patient.        Sincerely,        Jamal Gomes DO

## 2023-04-04 NOTE — NURSING NOTE
"Initial BP (!) 154/87 (BP Location: Right arm, Patient Position: Sitting, Cuff Size: Adult Regular)   Pulse 64   Temp 97  F (36.1  C) (Tympanic)   Ht 1.676 m (5' 5.98\")   Wt 67.1 kg (147 lb 14.9 oz)   BMI 23.89 kg/m   Estimated body mass index is 23.89 kg/m  as calculated from the following:    Height as of this encounter: 1.676 m (5' 5.98\").    Weight as of this encounter: 67.1 kg (147 lb 14.9 oz). .    Rafaela Hale MA    "

## 2023-04-04 NOTE — PROGRESS NOTES
Surgical Consultation/History and Physical  [facility]    Duane is seen in consultation for Hernia, at the request of Jose Coles MD.    Chief Complaint:  Hernia    History of Present Illness: Duane C Johnson is a 73 year old male presents with hernia.  Patient noted a bulge while moving last year.  He is wearing a truss at this time.  It has improved with belt.  Denies change in bowel habits.  He denies history of hernias in past.  He does smoke daily.    Patient Active Problem List   Diagnosis     Hyperlipidemia LDL goal <130     Benign essential hypertension     Past Medical History:   Diagnosis Date     Hypertension      Past Surgical History:   Procedure Laterality Date     COLONOSCOPY N/A 3/23/2023    Procedure: COLONOSCOPY, FLEXIBLE, WITH LESION REMOVAL USING SNARE;  Surgeon: Jose Faustin MD;  Location: [facility]     Family History   Problem Relation Age of Onset     Other Cancer Mother      Cervical Cancer Sister      GI problems Father      Social History     Tobacco Use     Smoking status: Every Day     Packs/day: 0.25     Types: Cigarettes     Smokeless tobacco: Never   Vaping Use     Vaping status: Never Used   Substance Use Topics     Alcohol use: Yes     Comment: occas      History   Drug Use No     Current Outpatient Medications   Medication Sig Dispense Refill     aspirin (ASA) 81 MG EC tablet Take 1 tablet (81 mg) by mouth daily 90 tablet 3     atorvastatin (LIPITOR) 20 MG tablet Take 1 tablet (20 mg) by mouth daily 90 tablet 1     bisacodyl (DULCOLAX) 5 MG EC tablet Take 2 tablets at 3 pm the day before your procedure. If your procedure is before 11 am, take 2 additional tablets at 11 pm. If your procedure is after 11 am, take 2 additional tablets at 6 am. For additional instructions refer to your colonoscopy prep instructions. 4 tablet 0     lisinopril-hydrochlorothiazide (ZESTORETIC) 10-12.5 MG tablet Take 1 tablet by mouth daily 90 tablet 1     Multiple  "Vitamins-Minerals (MULTIVITAMIN ADULT EXTRA C) CHEW        polyethylene glycol (GOLYTELY) 236 g suspension The night before the exam at 6 pm drink an 8-ounce glass every 15 minutes until the jug is half empty. If you arrive before 11 AM: Drink the other half of the Golytely jug at 11 PM night before procedure. If you arrive after 11 AM: Drink the other half of the Golytely jug at 6 AM day of procedure. For additional instructions refer to your colonoscopy prep instructions. 4000 mL 0     vitamin C (ASCORBIC ACID) 1000 MG TABS Take 1,000 mg by mouth       No Known Allergies    Physical Exam:  BP (!) 154/87 (BP Location: Right arm, Patient Position: Sitting, Cuff Size: Adult Regular)   Pulse 64   Temp 97  F (36.1  C) (Tympanic)   Ht 1.676 m (5' 5.98\")   Wt 67.1 kg (147 lb 14.9 oz)   BMI 23.89 kg/m      Constitutional- No acute distress, well nourished, non-toxic  Eyes: Anicteric, no injection.  PERRL  ENT:  Normocephalic, atraumatic, Nose midline, moist mucus membranes  Neck - supple, no LAD  Abdomen - Soft, non-tender, +BS, no hepatosplenomegaly, no palpable masses  Groins - Small left sided inguinal hernia.  No right inguinal hernia  Rectal - good tone, no masses, guaiac negative  Neuro - No focal neuro deficits, Alert and oriented x 3  Psych: Appropriate mood and affect  Musculoskeletal: Normal gait, symmetric strength.  FROM upper and lower extremities.  Skin: Warm, Dry    US Hernia:  INDICATION:  Groin swelling  COMPARISON: None.     TECHNIQUE: Transabdominal ultrasound of the groin during rest and Valsalva.     FINDINGS:      There is a fat-containing left groin hernia with the opening of the hernia measuring approximately 1.6 cm.                                                                      IMPRESSION:  1.  Fat-containing left inguinal hernia.    Assessment:  1. Unilateral inguinal hernia without obstruction or gangrene, recurrence not specified      Plan:   Duane C Johnson presents with " asymptomatic left inguinal hernia.  He is wearing a truss at this time.  We discussed the indications for hernia repair including discomfort and enlarging size.  I reviewed the ultrasound showing a fat containing hernia.  We discussed that hernias generally increase in size.  We reviewed the KRAMES guide for hernias in detail.  I discussed open inguinal hernia repair in detail with patient.  I discussed the relative risk of strangulation and signs/symptoms of such.  After our discussion he elects to continue conservative observation.  Should he become more symptomatic he will return for surgical planning.  I reviewed smoking increases risk of hernia recurrence.  I advised optimal hernia repair would involve smoking cessation.    Jamal Gomes, DO on 4/4/2023 at 10:05 AM

## 2023-04-06 ENCOUNTER — OFFICE VISIT (OUTPATIENT)
Dept: FAMILY MEDICINE | Facility: CLINIC | Age: 73
End: 2023-04-06
Payer: MEDICARE

## 2023-04-06 VITALS
HEART RATE: 75 BPM | RESPIRATION RATE: 18 BRPM | SYSTOLIC BLOOD PRESSURE: 130 MMHG | WEIGHT: 147 LBS | OXYGEN SATURATION: 99 % | TEMPERATURE: 97.2 F | HEIGHT: 66 IN | BODY MASS INDEX: 23.63 KG/M2 | DIASTOLIC BLOOD PRESSURE: 90 MMHG

## 2023-04-06 DIAGNOSIS — N42.89 PROSTATE LUMP: Primary | ICD-10-CM

## 2023-04-06 DIAGNOSIS — I10 BENIGN ESSENTIAL HYPERTENSION: ICD-10-CM

## 2023-04-06 DIAGNOSIS — D64.9 ANEMIA, UNSPECIFIED TYPE: ICD-10-CM

## 2023-04-06 LAB
ANION GAP SERPL CALCULATED.3IONS-SCNC: 8 MMOL/L (ref 7–15)
BUN SERPL-MCNC: 5.7 MG/DL (ref 8–23)
CALCIUM SERPL-MCNC: 10 MG/DL (ref 8.8–10.2)
CHLORIDE SERPL-SCNC: 91 MMOL/L (ref 98–107)
CREAT SERPL-MCNC: 0.87 MG/DL (ref 0.67–1.17)
DEPRECATED HCO3 PLAS-SCNC: 30 MMOL/L (ref 22–29)
ERYTHROCYTE [DISTWIDTH] IN BLOOD BY AUTOMATED COUNT: 12.4 % (ref 10–15)
FERRITIN SERPL-MCNC: 40 NG/ML (ref 31–409)
GFR SERPL CREATININE-BSD FRML MDRD: >90 ML/MIN/1.73M2
GLUCOSE SERPL-MCNC: 95 MG/DL (ref 70–99)
HCT VFR BLD AUTO: 37.2 % (ref 40–53)
HGB BLD-MCNC: 12.9 G/DL (ref 13.3–17.7)
IRON BINDING CAPACITY (ROCHE): 296 UG/DL (ref 240–430)
IRON SATN MFR SERPL: 32 % (ref 15–46)
IRON SERPL-MCNC: 94 UG/DL (ref 61–157)
MCH RBC QN AUTO: 31.3 PG (ref 26.5–33)
MCHC RBC AUTO-ENTMCNC: 34.7 G/DL (ref 31.5–36.5)
MCV RBC AUTO: 90 FL (ref 78–100)
PLATELET # BLD AUTO: 331 10E3/UL (ref 150–450)
POTASSIUM SERPL-SCNC: 3.9 MMOL/L (ref 3.4–5.3)
RBC # BLD AUTO: 4.12 10E6/UL (ref 4.4–5.9)
SODIUM SERPL-SCNC: 129 MMOL/L (ref 136–145)
WBC # BLD AUTO: 6.7 10E3/UL (ref 4–11)

## 2023-04-06 PROCEDURE — 85027 COMPLETE CBC AUTOMATED: CPT | Performed by: FAMILY MEDICINE

## 2023-04-06 PROCEDURE — 80048 BASIC METABOLIC PNL TOTAL CA: CPT | Performed by: FAMILY MEDICINE

## 2023-04-06 PROCEDURE — 83550 IRON BINDING TEST: CPT | Performed by: FAMILY MEDICINE

## 2023-04-06 PROCEDURE — 99213 OFFICE O/P EST LOW 20 MIN: CPT | Performed by: FAMILY MEDICINE

## 2023-04-06 PROCEDURE — 36415 COLL VENOUS BLD VENIPUNCTURE: CPT | Performed by: FAMILY MEDICINE

## 2023-04-06 PROCEDURE — 82728 ASSAY OF FERRITIN: CPT | Performed by: FAMILY MEDICINE

## 2023-04-06 PROCEDURE — 83540 ASSAY OF IRON: CPT | Performed by: FAMILY MEDICINE

## 2023-04-06 ASSESSMENT — PAIN SCALES - GENERAL: PAINLEVEL: NO PAIN (0)

## 2023-04-06 NOTE — NURSING NOTE
"Chief Complaint   Patient presents with     Prostate Problem     BP (!) 130/90 (Cuff Size: Adult Regular)   Pulse 75   Temp 97.2  F (36.2  C) (Tympanic)   Resp 18   Ht 1.676 m (5' 6\")   Wt 66.7 kg (147 lb)   SpO2 99%   BMI 23.73 kg/m   Estimated body mass index is 23.73 kg/m  as calculated from the following:    Height as of this encounter: 1.676 m (5' 6\").    Weight as of this encounter: 66.7 kg (147 lb).  Patient presents to the clinic using No DME      Health Maintenance that is potentially due pending provider review:    Health Maintenance Due   Topic Date Due     HEPATITIS C SCREENING  Never done     DTAP/TDAP/TD IMMUNIZATION (1 - Tdap) Never done     ZOSTER IMMUNIZATION (1 of 2) Never done                "

## 2023-04-06 NOTE — PROGRESS NOTES
"  Assessment & Plan     Prostate lump  Patient had colonoscopy recently, found to have prostate nodule on digital rectal exam.  Recent PSA level was normal.  No urinary symptoms currently.  Differentials discussed in detail and urology referral placed for further review and recommendations.  All questions answered.  - Adult Urology  Referral; Future    Follow-up in 3 months or earlier if needed.      Davey Randolph MD  [facility]    Subjective Duane is a 73 year old, presenting for the following health issues:  Prostate Problem      History of Present Illness       Reason for visit:  Prostate exam  Symptoms include:  A lump was found during colonscopy     He eats 0-1 servings of fruits and vegetables daily.He consumes 4 sweetened beverage(s) daily.He exercises with enough effort to increase his heart rate 9 or less minutes per day.  He exercises with enough effort to increase his heart rate 6 days per week.   He is taking medications regularly.         Review of Systems   Constitutional, HEENT, cardiovascular, pulmonary, gi and gu systems are negative, except as otherwise noted.      Objective    BP (!) 130/90 (Cuff Size: Adult Regular)   Pulse 75   Temp 97.2  F (36.2  C) (Tympanic)   Resp 18   Ht 1.676 m (5' 6\")   Wt 66.7 kg (147 lb)   SpO2 99%   BMI 23.73 kg/m    Body mass index is 23.73 kg/m .  Physical Exam   GENERAL: alert and no distress  NECK: no adenopathy, no asymmetry, masses, or scars and thyroid normal to palpation  RESP: lungs clear to auscultation - no rales, rhonchi or wheezes  CV: regular rates and rhythm, normal S1 S2, no S3 or S4 and no murmur, click or rub  ABDOMEN: soft, nontender  MS: no gross musculoskeletal defects noted, no edema  NEURO: Normal strength and tone, mentation intact and speech normal  PSYCH: mentation appears normal, affect normal/bright                "

## 2023-04-08 DIAGNOSIS — E87.1 HYPONATREMIA: ICD-10-CM

## 2023-04-08 DIAGNOSIS — I10 BENIGN ESSENTIAL HYPERTENSION: Primary | ICD-10-CM

## 2023-04-08 RX ORDER — AMLODIPINE BESYLATE 5 MG/1
5 TABLET ORAL DAILY
Qty: 90 TABLET | Refills: 1 | Status: SHIPPED | OUTPATIENT
Start: 2023-04-08 | End: 2023-09-07

## 2023-04-08 RX ORDER — LISINOPRIL 10 MG/1
10 TABLET ORAL DAILY
Qty: 90 TABLET | Refills: 1 | Status: SHIPPED | OUTPATIENT
Start: 2023-04-08 | End: 2023-10-09

## 2023-05-03 ENCOUNTER — VIRTUAL VISIT (OUTPATIENT)
Dept: UROLOGY | Facility: CLINIC | Age: 73
End: 2023-05-03
Attending: FAMILY MEDICINE
Payer: MEDICARE

## 2023-05-03 DIAGNOSIS — R97.20 ELEVATED PROSTATE SPECIFIC ANTIGEN (PSA): Primary | ICD-10-CM

## 2023-05-03 DIAGNOSIS — N40.2 PROSTATE NODULE: ICD-10-CM

## 2023-05-03 PROCEDURE — 99204 OFFICE O/P NEW MOD 45 MIN: CPT | Mod: 95 | Performed by: STUDENT IN AN ORGANIZED HEALTH CARE EDUCATION/TRAINING PROGRAM

## 2023-05-03 NOTE — PROGRESS NOTES
Chief Complaint:   Prostate nodule          History of Present Illness:   Duane C Johnson is a 73 year old male with a history of HLD and HTN who presents for evaluation of prostate nodule.     The patient had a colonoscopy on 3/23/2023 and was noted to have a prostate nodule. His PSA was 4.61 ng/mL on 3/13/2023.     He denies any bothersome urinary symptoms, including gross hematuria and dysuria. He denies a family history of prostate cancer, though does not know much of his family history.          Past Medical History:     Past Medical History:   Diagnosis Date     Hypertension             Past Surgical History:     Past Surgical History:   Procedure Laterality Date     COLONOSCOPY N/A 3/23/2023    Procedure: COLONOSCOPY, FLEXIBLE, WITH LESION REMOVAL USING SNARE;  Surgeon: Jose Faustin MD;  Location: [facility]            Medications     Current Outpatient Medications   Medication     amLODIPine (NORVASC) 5 MG tablet     aspirin (ASA) 81 MG EC tablet     atorvastatin (LIPITOR) 20 MG tablet     lisinopril (ZESTRIL) 10 MG tablet     Multiple Vitamins-Minerals (MULTIVITAMIN ADULT EXTRA C) CHEW     vitamin C (ASCORBIC ACID) 1000 MG TABS     No current facility-administered medications for this visit.            Allergies:   Patient has no known allergies.         Review of Systems:  From intake questionnaire   Negative 14 system review except as noted on HPI, nurse's note.         Physical Exam:   Patient is a 73 year old male evaluated via video visit.       Labs and Pathology:    I personally reviewed all applicable laboratory data and went over findings with patient  Significant for:    CBC RESULTS:  Recent Labs   Lab Test 04/06/23  1410 03/13/23  0905 05/26/21  0824 03/20/18  1157   WBC 6.7 4.9 12.2* 6.3   HGB 12.9* 12.6* 12.0* 13.9    308 274 261        BMP RESULTS:  Recent Labs   Lab Test 04/06/23  1410 03/13/23  0905 05/26/21  0824 04/22/19  1412 03/20/18  1157 02/23/17  0759   *  139 141 135 137 136   POTASSIUM 3.9 4.8 4.5 4.0 3.7 3.9   CHLORIDE 91* 101 105 100 102 101   CO2 30* 29 29 31 30 30   ANIONGAP 8 9 7 4 5 5   GLC 95 100* 125* 92 91 96   BUN 5.7* 7.3* 41* 11 13 22   CR 0.87 1.05 0.80 0.88 0.89 0.86   GFRESTIMATED >90 75 90 87 85 88   GFRESTBLACK  --   --  >90 >90 >90 >90  African American GFR Calc     PRANAV 10.0 10.4* 8.3* 9.5 9.1 9.4       PSA RESULTS  Prostate Specific Antigen Screen   Date Value Ref Range Status   03/13/2023 4.61 0.00 - 6.50 ng/mL Final              Assessment and Plan:     Assessment: 73 year old male seen in evaluation for a prostate nodule discovered on recent colonoscopy. His PSA was 4.61 ng/mL on 3/13/2023. We discussed that his PSA is slightly elevated, though is in a reasonable range for a patient in their 70s. Given the palpable prostate nodule, I would recommend a prostate MRI for further evaluation. Differential diagnosis includes prostate cancer or BPH nodule. The patient is in agreement. We discussed that if the prostate MRI is rated as PI-RADS 3, 4, or 5, subsequent prostate biopsy will be recommended.     Plan:  Obtain prostate MRI.     JASON DELGADO PA-C  Department of Urology      Patient requests scanned copy of his note be sent to his email. Faxed on 5/3/2023 at 11:50 AM and mailed to his home address.

## 2023-06-01 ENCOUNTER — HOSPITAL ENCOUNTER (OUTPATIENT)
Dept: MRI IMAGING | Facility: CLINIC | Age: 73
Discharge: HOME | End: 2023-06-01
Attending: STUDENT IN AN ORGANIZED HEALTH CARE EDUCATION/TRAINING PROGRAM | Admitting: STUDENT IN AN ORGANIZED HEALTH CARE EDUCATION/TRAINING PROGRAM
Payer: MEDICARE

## 2023-06-01 ENCOUNTER — TELEPHONE (OUTPATIENT)
Dept: UROLOGY | Facility: CLINIC | Age: 73
End: 2023-06-01

## 2023-06-01 DIAGNOSIS — R97.20 ELEVATED PROSTATE SPECIFIC ANTIGEN (PSA): ICD-10-CM

## 2023-06-01 DIAGNOSIS — N40.2 PROSTATE NODULE: ICD-10-CM

## 2023-06-01 PROCEDURE — G1010 CDSM STANSON: HCPCS

## 2023-06-01 PROCEDURE — A9585 GADOBUTROL INJECTION: HCPCS | Performed by: STUDENT IN AN ORGANIZED HEALTH CARE EDUCATION/TRAINING PROGRAM

## 2023-06-01 PROCEDURE — 72197 MRI PELVIS W/O & W/DYE: CPT | Mod: 26 | Performed by: RADIOLOGY

## 2023-06-01 PROCEDURE — G1010 CDSM STANSON: HCPCS | Performed by: RADIOLOGY

## 2023-06-01 PROCEDURE — 255N000002 HC RX 255 OP 636: Performed by: STUDENT IN AN ORGANIZED HEALTH CARE EDUCATION/TRAINING PROGRAM

## 2023-06-01 RX ORDER — GADOBUTROL 604.72 MG/ML
7.5 INJECTION INTRAVENOUS ONCE
Status: COMPLETED | OUTPATIENT
Start: 2023-06-01 | End: 2023-06-01

## 2023-06-01 RX ADMIN — GADOBUTROL 7.5 ML: 604.72 INJECTION INTRAVENOUS at 11:36

## 2023-06-01 NOTE — TELEPHONE ENCOUNTER
Called patient to review prostate MRI results. Message left with his wife for patient to return call. No consent to communicate with wife on file.     I will be out of clinic tomorrow, but will call the patient on Monday if he has questions. Ok to tell patient prostate MRI looks suspicious for prostate cancer and he will need a prostate biopsy for further evaluation.     Aranza Jiménez PA-C  Department of Urology

## 2023-06-02 NOTE — TELEPHONE ENCOUNTER
Patient called back and writer read note from provider below. Patient would like like a prostate biopsy scheduled asap. Please call patient back to discuss results.    Jyoti Prince  [facility]

## 2023-06-07 ENCOUNTER — TELEPHONE (OUTPATIENT)
Dept: UROLOGY | Facility: CLINIC | Age: 73
End: 2023-06-07
Payer: MEDICARE

## 2023-06-07 NOTE — TELEPHONE ENCOUNTER
----- Message from Deana Ge LPN sent at 6/6/2023  2:32 PM CDT -----  Regarding: FW: Prostate biopsy  Please help patient schedule bx with next available Ainsley/Yeyo/Marium and a week follow up for results.    Thank you.  Deana MORFIN LPN  Urology Clinic Service   [facility]  ----- Message -----  From: Aranza Jiménez PA-C  Sent: 6/5/2023   8:26 AM CDT  To: Christen Santillan LPN; Deana Ge LPN  Subject: Prostate biopsy                                  Patient with PI-RADS 4 MRI. Could you please schedule for prostate biopsy?

## 2023-09-05 ENCOUNTER — PRE VISIT (OUTPATIENT)
Dept: UROLOGY | Facility: CLINIC | Age: 73
End: 2023-09-05
Payer: MEDICARE

## 2023-09-05 NOTE — TELEPHONE ENCOUNTER
Reason for visit: bx results     Dx/Hx/Sx: bladder cancer    Records/imaging/labs/orders: in epic    At Rooming: virtual visit

## 2023-09-07 DIAGNOSIS — I10 BENIGN ESSENTIAL HYPERTENSION: ICD-10-CM

## 2023-09-07 DIAGNOSIS — E78.5 HYPERLIPIDEMIA LDL GOAL <130: ICD-10-CM

## 2023-09-07 RX ORDER — ATORVASTATIN CALCIUM 20 MG/1
20 TABLET, FILM COATED ORAL DAILY
Qty: 90 TABLET | Refills: 1 | Status: ON HOLD | OUTPATIENT
Start: 2023-09-07 | End: 2023-11-03

## 2023-09-07 NOTE — TELEPHONE ENCOUNTER
Routing refill request to provider for review/approval because:  Labs out of range:    BP Readings from Last 3 Encounters:   04/06/23 (!) 130/90   04/04/23 (!) 154/87   03/23/23 (!) 162/94     Last Written Prescription Date:  4/8/23  Last Fill Quantity: 90,  # refills: 1   Last office visit: 4/6/2023 ; last virtual visit: Visit date not found with prescribing provider:     Future Office Visit:      Julie Behrendt RN

## 2023-09-11 RX ORDER — AMLODIPINE BESYLATE 5 MG/1
5 TABLET ORAL DAILY
Qty: 90 TABLET | Refills: 1 | Status: ON HOLD | OUTPATIENT
Start: 2023-09-11 | End: 2023-11-03

## 2023-09-12 ENCOUNTER — OFFICE VISIT (OUTPATIENT)
Dept: UROLOGY | Facility: CLINIC | Age: 73
End: 2023-09-12
Payer: MEDICARE

## 2023-09-12 VITALS
WEIGHT: 135 LBS | SYSTOLIC BLOOD PRESSURE: 114 MMHG | DIASTOLIC BLOOD PRESSURE: 69 MMHG | HEART RATE: 50 BPM | BODY MASS INDEX: 21.69 KG/M2 | HEIGHT: 66 IN

## 2023-09-12 DIAGNOSIS — R97.20 ELEVATED PROSTATE SPECIFIC ANTIGEN (PSA): Primary | ICD-10-CM

## 2023-09-12 PROCEDURE — 88305 TISSUE EXAM BY PATHOLOGIST: CPT | Mod: TC | Performed by: UROLOGY

## 2023-09-12 PROCEDURE — 76872 US TRANSRECTAL: CPT | Performed by: UROLOGY

## 2023-09-12 PROCEDURE — 55700 PR BIOPSY OF PROSTATE,NEEDLE/PUNCH: CPT | Performed by: UROLOGY

## 2023-09-12 PROCEDURE — G0416 PROSTATE BIOPSY, ANY MTHD: HCPCS | Mod: 26 | Performed by: PATHOLOGY

## 2023-09-12 PROCEDURE — 76999 ECHO EXAMINATION PROCEDURE: CPT | Mod: 26 | Performed by: UROLOGY

## 2023-09-12 RX ORDER — GENTAMICIN 40 MG/ML
80 INJECTION, SOLUTION INTRAMUSCULAR; INTRAVENOUS ONCE
Status: COMPLETED | OUTPATIENT
Start: 2023-09-12 | End: 2023-09-12

## 2023-09-12 RX ADMIN — GENTAMICIN 80 MG: 40 INJECTION, SOLUTION INTRAMUSCULAR; INTRAVENOUS at 09:57

## 2023-09-12 ASSESSMENT — PAIN SCALES - GENERAL: PAINLEVEL: NO PAIN (0)

## 2023-09-12 NOTE — LETTER
9/12/2023       RE: Duane C Johnson  [id number] [address] [place] [id number]     Dear Colleague,    Thank you for referring your patient, Duane C Johnson, to the [facility] [place] at [facility]. Please see a copy of my visit note below.    PREPROCEDURE DIAGNOSIS: Elevated PSA  POSTPROCEDURE DIAGNOSIS: Elevated PSA.   PROCEDURE: Transrectal ultrasound sizing and transrectal ultrasound guided prostatic needle biopsy, including MRI fusion targeting  for Duane Johnson who is a 73 year old male.   SURGEON: Blas Causey  ANESTHESIA: 5 mL of 1% periprostatic block bilaterally   We previously obtained an MRI of the prostate and identified all PIRADS 3-5 lesions for targeting    Target Lesion #1 PIRADS 4 - right mid gland peripheral zone  Target Lesion #2 PIRADS 4 - left mid gland peripheral zone  Target Lesion #3 PIRADS 4 - left apex peripheral zone    DESCRIPTION OF PROCEDURE: The procedure, the outcome, the anesthesia, and the risks were discussed with the patient. Informed consent was obtained and signed and a timeout was completed prior to the procedure. Digital rectal examination was performed with the below findings noted. Anesthesia was administered as noted above and the transrectal ultrasound probe was inserted, sizing was performed, and the below findings were noted. Two core biopsies were taken from each of the MRI targets, and 12 core biopsies were taken as described below. The probe was then withdrawn. Patient tolerated the procedure well.   FINDINGS: Digital rectal exam reveals normal prostate. Total volume is 20 mL. Hypoechoic lesion bilaterally. US images were obtained and then fused with the MRI images.  The fused images were then used to guide the biopsy of the targeted lesions with 2 cores taken of each lesion.  We then performed random biopsies.  12 cores were taken with 6 on each side, base, mid and apex.  PLAN: Will follow-up  next week to review results.  Blas Causey MD  Urology  [facility]

## 2023-09-12 NOTE — NURSING NOTE
"Chief Complaint   Patient presents with    Prostate Biopsy       Blood pressure 114/69, pulse 50, height 1.676 m (5' 6\"), weight 61.2 kg (135 lb). Body mass index is 21.79 kg/m .    Patient Active Problem List   Diagnosis    Hyperlipidemia LDL goal <130    Benign essential hypertension       No Known Allergies    Current Outpatient Medications   Medication Sig Dispense Refill    amLODIPine (NORVASC) 5 MG tablet Take 1 tablet (5 mg) by mouth daily 90 tablet 1    aspirin (ASA) 81 MG EC tablet Take 1 tablet (81 mg) by mouth daily 90 tablet 3    atorvastatin (LIPITOR) 20 MG tablet Take 1 tablet (20 mg) by mouth daily 90 tablet 1    Multiple Vitamins-Minerals (MULTIVITAMIN ADULT EXTRA C) CHEW       vitamin C (ASCORBIC ACID) 1000 MG TABS Take 1,000 mg by mouth      lisinopril (ZESTRIL) 10 MG tablet Take 1 tablet (10 mg) by mouth daily (Patient not taking: Reported on 2023) 90 tablet 1       Social History     Tobacco Use    Smoking status: Every Day     Packs/day: 0.25     Types: Cigarettes     Passive exposure: Current    Smokeless tobacco: Never   Vaping Use    Vaping Use: Never used   Substance Use Topics    Alcohol use: Yes     Comment: occas    Drug use: No       Invasive Procedure Safety Checklist:    Procedure: MRI fusion TRUS prostate biopsy    Action: Complete sections and checkboxes as appropriate.    Pre-procedure:  1. Patient ID Verified with 2 identifiers (Paty and  or MRN) : YES    2. Procedure and site verified with patient/designee (when able) : YES    3. Accurate consent documentation in medical record : YES    4. H&P (or appropriate assessment) documented in medical record : N/A  H&P must be up to 30 days prior to procedure an updated within 24 hours of                 Procedure as applicable.     5. Relevant diagnostic and radiology test results appropriately labeled and displayed as applicable : YES    6. Blood products, implants, devices, and/or special equipment available for the procedure as " applicable : YES    7. Procedure site(s) marked with provider initials [Exclusions: none] : NO    8. Marking not required. Reason : Yes  Procedure does not require site marking    Time Out:     Time-Out performed immediately prior to starting procedure, including verbal and active participation of all team members addressing: YES    1. Correct patient identity.  2. Confirmed that the correct side and site are marked.  3. An accurate procedure to be done.  4. Agreement on the procedure to be done.  5. Correct patient position.  6. Relevant images and results are properly labeled and appropriately displayed.  7. The need to administer antibiotics or fluids for irrigation purposes during the procedure as applicable.  8. Safety precautions based on patient history or medication use.    During Procedure: Verification of correct person, site, and procedure occurs any time the responsibility for care of the patient is transferred to another member of the care team.    The following medication was given:     MEDICATION:  Lidocaine without epinephrine 1%  ROUTE: administered by physician - prostate nerve block   SITE: administered by physician - prostate nerve block    DOSE: 10 mL  LOT #: [id number]   : [employer]  EXPIRATION DATE: 12/26  NDC#: 78916-048-11   Was there drug waste? Yes  Amount of drug waste (mL): 20 mL.  Reason for waste:  Single use vial    Prior to injection, verified patient identity using patient's name and date of birth.  Due to injection administration, patient instructed to remain in clinic for 15 minutes  afterwards, and to report any adverse reaction to me immediately.    Drug Amount Wasted:  Yes: 20 mL (10 mg/ml )  Vial/Syringe: Single dose vial    Nellie Barney  9/12/2023  9:22 AM

## 2023-09-12 NOTE — PROGRESS NOTES
PREPROCEDURE DIAGNOSIS: Elevated PSA  POSTPROCEDURE DIAGNOSIS: Elevated PSA.   PROCEDURE: Transrectal ultrasound sizing and transrectal ultrasound guided prostatic needle biopsy, including MRI fusion targeting  for Duane Johnson who is a 73 year old male.   SURGEON: Blas Causey  ANESTHESIA: 5 mL of 1% periprostatic block bilaterally   We previously obtained an MRI of the prostate and identified all PIRADS 3-5 lesions for targeting    Target Lesion #1 PIRADS 4 - right mid gland peripheral zone  Target Lesion #2 PIRADS 4 - left mid gland peripheral zone  Target Lesion #3 PIRADS 4 - left apex peripheral zone    DESCRIPTION OF PROCEDURE: The procedure, the outcome, the anesthesia, and the risks were discussed with the patient. Informed consent was obtained and signed and a timeout was completed prior to the procedure. Digital rectal examination was performed with the below findings noted. Anesthesia was administered as noted above and the transrectal ultrasound probe was inserted, sizing was performed, and the below findings were noted. Two core biopsies were taken from each of the MRI targets, and 12 core biopsies were taken as described below. The probe was then withdrawn. Patient tolerated the procedure well.   FINDINGS: Digital rectal exam reveals normal prostate. Total volume is 20 mL. Hypoechoic lesion bilaterally. US images were obtained and then fused with the MRI images.  The fused images were then used to guide the biopsy of the targeted lesions with 2 cores taken of each lesion.  We then performed random biopsies.  12 cores were taken with 6 on each side, base, mid and apex.  PLAN: Will follow-up next week to review results.  Blas Causey MD  Urology  [facility]

## 2023-09-12 NOTE — PATIENT INSTRUCTIONS
[facility]  Precautions Following a Prostate Biopsy    There are four conditions that you should watch for after a prostate biopsy:    Excessive pain  Bleeding irregularities (passing numerous  dime sized  clots or if your urine looks like cranberry juice)  Fever of 100 degrees or more  If you are unable to urinate      If any of these occur, call the Urology Clinic during normal business hours (M-F, 8:00-4:30) at [phone].  If you experience a problem after normal business hours, call our 24-hour phone number at [phone] and ask for the Urology Resident on call to be paged.    If you experience any discomfort following the biopsy, you may take Tylenol.  DO NOT TAKE ASPIRIN unless specified by your physician.   If the discomfort becomes severe or uncontrolled by medication, contact the Urology Clinic or Urology Resident (after normal business hours).    Do not be alarmed if you have some blood in your stool, in your urine, or ejaculate (semen).  This occurrence is normal and may last up to three (3) or four (4) days, usually intermittently.  Blood in the ejaculate (semen) may last several weeks, up to about a dozen ejaculations.  The blood in your ejaculate may appear as brown streaks, blood tinged, and immediately following a biopsy, it may appear bright red.    If you run a fever above 100 degrees, call the Urology Clinic or Urology Resident (after normal business hours) immediately.  If you are unable to reach your physician or the Resident on call, go to the nearest emergency room.  Explain that you have had a transrectal biopsy of your prostate and what problems you are experiencing.      You should attempt to urinate following your biopsy before you leave the clinic.  If you are unable to urinate four (4) to six (6) hours after you leave the clinic, you will need to contact the Urology Clinic or the Resident on call.  If you are unable to reach your physician or the  Resident on call, go to the nearest emergency room.            If you have any questions or concerns after your biopsy, feel free to contact the Urology Clinic at [phone] during M-F, 8:00-5pm business hours.  If you need to speak with someone after normal business hours, call [phone] and ask for the Resident on call to be paged.

## 2023-09-12 NOTE — NURSING NOTE
Injection Nursing Note:  Duane presents today for a prostate biopsy.      Gentamicin   Injection Given: Left deltoid.    HowDo  Lot: [id number]  EXP: 07/24  NDC: 98973-003-31     Post Injection Assessment:  Patient tolerated injection without incident.  Site free from redness, edema or discomfort.       Discharge Plan:   Patient discharged in stable condition accompanied by: Self.  Departure Mode: Ambulatory.     Eve Sosa RN

## 2023-09-13 LAB
PATH REPORT.COMMENTS IMP SPEC: ABNORMAL
PATH REPORT.COMMENTS IMP SPEC: YES
PATH REPORT.FINAL DX SPEC: ABNORMAL
PATH REPORT.GROSS SPEC: ABNORMAL
PATH REPORT.MICROSCOPIC SPEC OTHER STN: ABNORMAL
PATH REPORT.RELEVANT HX SPEC: ABNORMAL
PHOTO IMAGE: ABNORMAL

## 2023-09-19 ENCOUNTER — VIRTUAL VISIT (OUTPATIENT)
Dept: UROLOGY | Facility: CLINIC | Age: 73
End: 2023-09-19
Payer: MEDICARE

## 2023-09-19 DIAGNOSIS — C61 PROSTATE CANCER (H): Primary | ICD-10-CM

## 2023-09-19 PROCEDURE — 99214 OFFICE O/P EST MOD 30 MIN: CPT | Mod: VID | Performed by: UROLOGY

## 2023-09-19 NOTE — PROGRESS NOTES
Duane C Johnson is a 73 year old male who is being evaluated via a billable video visit.      How would you like to obtain your AVS? MyChart  If the video visit is dropped, the invitation should be resent by: Text to cell phone: [phone]  Will anyone else be joining your video visit? No    Video Start Time: 2:44 PM    CHIEF COMPLAINT   It was my pleasure to see Duane C Johnson who is a 73 year old male for follow-up of Prostate Cancer.      HPI   Duane C Johnson is a very pleasant 73 year old male who presents with a history of Prostate Cancer.  PSA 4.61 on 3/13/2023. MRI with PIRADS 4 lesion. Now with Sarah 4+3=7 disease on fusion biopsy. No complications from biopsy.    He reports no significant urinary symptoms at baseline. He reports normal erectile function.    Fusion Biopsy 9/12/2023  Final Diagnosis   A.  Prostate, target 1, biopsy:  - Prostatic adenocarcinoma  - [place] score 6 (3+3)  - Grade Group 1  - Extent: Involves 2 of 2 cores (7 mm, 70% and 7 mm, 70%)  - Perineural invasion is present     B.  Prostate, target 2, biopsy:   - Prostatic adenocarcinoma  - Sarah score 7 (4+3)  - Grade group 3  - Extent: Involves 1 of 2 cores (3 mm, 20%)     C.  Prostate, target 3, biopsy:  - Benign prostate tissue     D.  Prostate, right base, biopsy:  - Prostatic adenocarcinoma  - Sarah score 6 (3+3)  - Grade Group 1  - Extent: Involves 1 of 2 cores (1 mm, 10%)     E.  Prostate, right mid, biopsy:  - Prostatic adenocarcinoma  - [place] score 6 (3+3)  - Grade Group 1  - Extent: Involves 2 of 2 cores (7 mm, 70% and 3 mm, 25%)     F.  Prostate, right apex, biopsy:  - Prostatic adenocarcinoma  - [place] score 6 (3+3)  - Grade Group 1  - Extent: Involves 2 of 2 cores (5 mm, 40% and 2 mm, 20%)     G.  Prostate, left base, biopsy:  - Prostatic adenocarcinoma  - [place] score 7 (4+3)  - Grade group 3  - Extent: Involves 2 of 2 cores (2 mm, 10% and 1 mm, 10%)     H.  Prostate, left mid, biopsy:  - Prostatic  adenocarcinoma  - Sarah score 7 (4+3)  - Grade group 3  - Extent: Involves 2 of 2 cores (2 mm, 20% and 3 mm, 20%)     I.  Prostate, left apex, biopsy:  - Prostatic adenocarcinoma  - [place] score 7 (4+3)  - Grade group 3  - Extent: Involves 1 of 2 cores (3 mm, 10%)     MRI Prostate 6/1/2023  Size: 4.1 x 4.2 x 2.9 cm, 26 grams   IMPRESSION:  1.  Based on the most suspicious abnormality, this exam is  characterized as PIRADS 4 - Clinically significant cancer is likely to  be present.  The most suspicious abnormality is located at the right  mid gland peripheral zone at 7:00 and there is short segment capsular  abutment with low likelihood of minimal extraprostatic extension.  2.  There are 2 additional PIRADS 4 lesions as detailed above.  3.  The lesions approach the neurovascular bundles without direct  invasion.  4.  No suspicious adenopathy or evidence of pelvic metastases.         Allergies:   Patient has no known allergies.         Review of Systems:  From intake questionnaire     Skin: negative  Eyes: negative  Ears/Nose/Throat: negative  Respiratory: No shortness of breath, dyspnea on exertion, cough, or hemoptysis  Cardiovascular: No chest pain or palpitations  Gastrointestinal: negative; no nausea/vomiting, constipation or diarrhea  Genitourinary: as per HPI  Musculoskeletal: negative  Neurologic: negative  Psychiatric: negative  Hematologic/Lymphatic/Immunologic: negative  Endocrine: negative         Physical Exam:     Vitals:  No vitals were obtained today due to virtual visit.    Physical Exam   GENERAL: Healthy, alert and no distress  EYES: Eyes grossly normal to inspection.  No discharge or erythema, or obvious scleral/conjunctival abnormalities.  RESP: No audible wheeze, cough, or visible cyanosis.  No visible retractions or increased work of breathing.    SKIN: Visible skin clear. No significant rash, abnormal pigmentation or lesions.  NEURO: Cranial nerves grossly intact.  Mentation and speech  appropriate for age.  PSYCH: Mentation appears normal, affect normal/bright, judgement and insight intact, normal speech and appearance well-groomed.      Outside and Past Medical records:    Review of the result(s) of each unique test - PSA, MRI, pathology         Assessment and Plan:     Assessment: 73 year old male with Sarah 4+3=7 prostate cancer, PIRADS 4 on MRI, PSA 4.61. We had an extensive discussion about the significance of localized, prostate cancer. We discussed the options for treatment of a localized prostate cancer including active surveillance, brachytherapy, external beam radiation therapy, and surgical removal. We discussed that based on his Sarah score he would not be an ideal candidate for active surveillance.       We discussed the relative merits of robotic assisted radical prostatectomy. We also discussed the advantages and disadvantages and roles of open surgery vs. laparoscopic (and Da Kami assisted) surgery. The anticipated post-operative course was explained, including an anticipated 1-2 day hospital stay. Catheter will remain in place 7-10 days.      We discussed that there was no clear evidence of advantage between surgery and radiation with regard to risk of recurrence. Regarding radiation, we discussed risks including but not limited to cancer recurrence, exacerbation of voiding symptoms or hematuria, urinary retention, incontinence, stricture of the urinary tract, induction of second malignancy, and risks of rectal symptoms or bleeding.  We discussed fact that rectal symptoms may be more common with radiation than with other treatment modalities. With radiation therapy, we also discussed the difficulties in diagnosing recurrent disease at an early stage due to variability in PSA levels and the fact that most patients are not candidates for local salvage therapy when biochemical recurrence is declared.  We also discussed the significant morbidity of post-radiation local salvage  therapy in terms of perioperative complications, erectile dysfunction and urinary incontinence. With surgery, we also discussed the potential advantage over radiation therapy in that biochemical recurrence can be detected at a relatively earlier stage and that salvage radiotherapy is successful in controlling recurrent disease in a substantial proportion of patients.  We also discussed that salvage radiotherapy was associated with a considerably more favorable morbidity profile compared to local salvage therapies for recurrent disease post-radiation.     We discussed option for further discussion with radiation oncology, but patient is interested in surgery and would prefer to defer at this time.      The risks, benefits, alternatives, and personnel involved with robotic prostatectomy were discussed in detail. Specifically, we discussed that risks include but are not limited to anesthetic complications including stroke, MI, DVT/PE, as well as risk of bleeding requiring transfusion, bowel injury, infection, lymphocele, ureteral injury, nerve injury,urine leak and other potentially unforseen complications. Also discussed the risk of bladder neck contracture and other urinary symptoms after procedure. In addition, we discussed risk long-term of urinary incontinence and erectile dysfunction following the procedure. Finally, we discussed risk for adverse pathologic features, including positive surgical margins and potential for post-surgical radiation, hormone ablation and long-term need for PSA monitoring.  All questions were answered in detail.  A written informed consent will be finalized on the morning of the procedure.     Plan:  Schedule for robotic prostatectomy with pelvic lymphadenectomy    Orders  Orders Placed This Encounter   Procedures    Case Request: ROBOTIC-ASSISTED LAPAROSCOPIC RADICAL PROSTATECTOMY WITH PELVIC LYMPHADENECTOMY     Video-Visit Details    Type of service:  Video Visit    Video End Time:  3:15 PM    Originating Location (pt. Location): Home    Distant Location (provider location):  On-site    Platform used for Video Visit: [employer]    31 minutes spent on the date of the encounter including direct interaction with the patient, performing chart review, history and exam, documentation and further activities as noted above.    Blas Causey MD  Urology  [facility]

## 2023-09-19 NOTE — NURSING NOTE
Is the patient currently in the state of [place]? YES    Visit mode:VIDEO    If the visit is dropped, the patient can be reconnected by: VIDEO VISIT: Send to e-mail at: [email or web address]    Will anyone else be joining the visit? NO  (If patient encounters technical issues they should call [phone]    How would you like to obtain your AVS? MyChart    Are changes needed to the allergy or medication list? No    Reason for visit: Follow Up    Geovanna JOHNSTON

## 2023-09-19 NOTE — LETTER
9/19/2023       RE: Duane C Johnson  [id number] [facility] [address]     Dear Colleague,    Thank you for referring your patient, Duane C Johnson, to the [facility] [place] at [facility]. Please see a copy of my visit note below.    Duane C Johnson is a 73 year old male who is being evaluated via a billable video visit.      How would you like to obtain your AVS? MyChart  If the video visit is dropped, the invitation should be resent by: Text to cell phone: [phone]  Will anyone else be joining your video visit? No    Video Start Time: 2:44 PM    CHIEF COMPLAINT   It was my pleasure to see Duane C Johnson who is a 73 year old male for follow-up of Prostate Cancer.      HPI   Duane C Johnson is a very pleasant 73 year old male who presents with a history of Prostate Cancer.  PSA 4.61 on 3/13/2023. MRI with PIRADS 4 lesion. Now with Sarah 4+3=7 disease on fusion biopsy. No complications from biopsy.    He reports no significant urinary symptoms at baseline. He reports normal erectile function.    Fusion Biopsy 9/12/2023  Final Diagnosis   A.  Prostate, target 1, biopsy:  - Prostatic adenocarcinoma  - [place] score 6 (3+3)  - Grade Group 1  - Extent: Involves 2 of 2 cores (7 mm, 70% and 7 mm, 70%)  - Perineural invasion is present     B.  Prostate, target 2, biopsy:   - Prostatic adenocarcinoma  - [place] score 7 (4+3)  - Grade group 3  - Extent: Involves 1 of 2 cores (3 mm, 20%)     C.  Prostate, target 3, biopsy:  - Benign prostate tissue     D.  Prostate, right base, biopsy:  - Prostatic adenocarcinoma  - [place] score 6 (3+3)  - Grade Group 1  - Extent: Involves 1 of 2 cores (1 mm, 10%)     E.  Prostate, right mid, biopsy:  - Prostatic adenocarcinoma  - [place] score 6 (3+3)  - Grade Group 1  - Extent: Involves 2 of 2 cores (7 mm, 70% and 3 mm, 25%)     F.  Prostate, right apex, biopsy:  - Prostatic adenocarcinoma  - Sarah score 6  (3+3)  - Grade Group 1  - Extent: Involves 2 of 2 cores (5 mm, 40% and 2 mm, 20%)     G.  Prostate, left base, biopsy:  - Prostatic adenocarcinoma  - [place] score 7 (4+3)  - Grade group 3  - Extent: Involves 2 of 2 cores (2 mm, 10% and 1 mm, 10%)     H.  Prostate, left mid, biopsy:  - Prostatic adenocarcinoma  - [place] score 7 (4+3)  - Grade group 3  - Extent: Involves 2 of 2 cores (2 mm, 20% and 3 mm, 20%)     I.  Prostate, left apex, biopsy:  - Prostatic adenocarcinoma  - [place] score 7 (4+3)  - Grade group 3  - Extent: Involves 1 of 2 cores (3 mm, 10%)     MRI Prostate 6/1/2023  Size: 4.1 x 4.2 x 2.9 cm, 26 grams   IMPRESSION:  1.  Based on the most suspicious abnormality, this exam is  characterized as PIRADS 4 - Clinically significant cancer is likely to  be present.  The most suspicious abnormality is located at the right  mid gland peripheral zone at 7:00 and there is short segment capsular  abutment with low likelihood of minimal extraprostatic extension.  2.  There are 2 additional PIRADS 4 lesions as detailed above.  3.  The lesions approach the neurovascular bundles without direct  invasion.  4.  No suspicious adenopathy or evidence of pelvic metastases.         Allergies:   Patient has no known allergies.         Review of Systems:  From intake questionnaire     Skin: negative  Eyes: negative  Ears/Nose/Throat: negative  Respiratory: No shortness of breath, dyspnea on exertion, cough, or hemoptysis  Cardiovascular: No chest pain or palpitations  Gastrointestinal: negative; no nausea/vomiting, constipation or diarrhea  Genitourinary: as per HPI  Musculoskeletal: negative  Neurologic: negative  Psychiatric: negative  Hematologic/Lymphatic/Immunologic: negative  Endocrine: negative         Physical Exam:     Vitals:  No vitals were obtained today due to virtual visit.    Physical Exam   GENERAL: Healthy, alert and no distress  EYES: Eyes grossly normal to inspection.  No discharge or erythema, or obvious  scleral/conjunctival abnormalities.  RESP: No audible wheeze, cough, or visible cyanosis.  No visible retractions or increased work of breathing.    SKIN: Visible skin clear. No significant rash, abnormal pigmentation or lesions.  NEURO: Cranial nerves grossly intact.  Mentation and speech appropriate for age.  PSYCH: Mentation appears normal, affect normal/bright, judgement and insight intact, normal speech and appearance well-groomed.      Outside and Past Medical records:    Review of the result(s) of each unique test - PSA, MRI, pathology         Assessment and Plan:     Assessment: 73 year old male with Sarah 4+3=7 prostate cancer, PIRADS 4 on MRI, PSA 4.61. We had an extensive discussion about the significance of localized, prostate cancer. We discussed the options for treatment of a localized prostate cancer including active surveillance, brachytherapy, external beam radiation therapy, and surgical removal. We discussed that based on his Sarah score he would not be an ideal candidate for active surveillance.       We discussed the relative merits of robotic assisted radical prostatectomy. We also discussed the advantages and disadvantages and roles of open surgery vs. laparoscopic (and Da Kami assisted) surgery. The anticipated post-operative course was explained, including an anticipated 1-2 day hospital stay. Catheter will remain in place 7-10 days.      We discussed that there was no clear evidence of advantage between surgery and radiation with regard to risk of recurrence. Regarding radiation, we discussed risks including but not limited to cancer recurrence, exacerbation of voiding symptoms or hematuria, urinary retention, incontinence, stricture of the urinary tract, induction of second malignancy, and risks of rectal symptoms or bleeding.  We discussed fact that rectal symptoms may be more common with radiation than with other treatment modalities. With radiation therapy, we also discussed the  difficulties in diagnosing recurrent disease at an early stage due to variability in PSA levels and the fact that most patients are not candidates for local salvage therapy when biochemical recurrence is declared.  We also discussed the significant morbidity of post-radiation local salvage therapy in terms of perioperative complications, erectile dysfunction and urinary incontinence. With surgery, we also discussed the potential advantage over radiation therapy in that biochemical recurrence can be detected at a relatively earlier stage and that salvage radiotherapy is successful in controlling recurrent disease in a substantial proportion of patients.  We also discussed that salvage radiotherapy was associated with a considerably more favorable morbidity profile compared to local salvage therapies for recurrent disease post-radiation.     We discussed option for further discussion with radiation oncology, but patient is interested in surgery and would prefer to defer at this time.      The risks, benefits, alternatives, and personnel involved with robotic prostatectomy were discussed in detail. Specifically, we discussed that risks include but are not limited to anesthetic complications including stroke, MI, DVT/PE, as well as risk of bleeding requiring transfusion, bowel injury, infection, lymphocele, ureteral injury, nerve injury,urine leak and other potentially unforseen complications. Also discussed the risk of bladder neck contracture and other urinary symptoms after procedure. In addition, we discussed risk long-term of urinary incontinence and erectile dysfunction following the procedure. Finally, we discussed risk for adverse pathologic features, including positive surgical margins and potential for post-surgical radiation, hormone ablation and long-term need for PSA monitoring.  All questions were answered in detail.  A written informed consent will be finalized on the morning of the procedure.      Plan:  Schedule for robotic prostatectomy with pelvic lymphadenectomy    Orders  Orders Placed This Encounter   Procedures    Case Request: ROBOTIC-ASSISTED LAPAROSCOPIC RADICAL PROSTATECTOMY WITH PELVIC LYMPHADENECTOMY     Video-Visit Details    Type of service:  Video Visit    Video End Time: 3:15 PM    Originating Location (pt. Location): Home    Distant Location (provider location):  On-site    Platform used for Video Visit: [employer]    31 minutes spent on the date of the encounter including direct interaction with the patient, performing chart review, history and exam, documentation and further activities as noted above.    Blas Causey MD  Urology  [facility]

## 2023-09-20 RX ORDER — CEFAZOLIN SODIUM 2 G/50ML
2 SOLUTION INTRAVENOUS
Status: CANCELLED | OUTPATIENT
Start: 2023-09-20

## 2023-09-20 RX ORDER — CEFAZOLIN SODIUM 2 G/50ML
2 SOLUTION INTRAVENOUS SEE ADMIN INSTRUCTIONS
Status: CANCELLED | OUTPATIENT
Start: 2023-09-20

## 2023-09-20 RX ORDER — HEPARIN SODIUM 5000 [USP'U]/.5ML
5000 INJECTION, SOLUTION INTRAVENOUS; SUBCUTANEOUS
Status: CANCELLED | OUTPATIENT
Start: 2023-09-20

## 2023-09-22 ENCOUNTER — TELEPHONE (OUTPATIENT)
Dept: UROLOGY | Facility: CLINIC | Age: 73
End: 2023-09-22
Payer: MEDICARE

## 2023-09-22 NOTE — TELEPHONE ENCOUNTER
Patient is scheduled for surgery with Dr. martel    Spoke with: duane and his wife    Date of Surgery: 1027    Location: [facility]    Informed patient they will need an adult  y    Pre op with Provider y    H&P: Scheduled with pt to sched with PMD    Additional imaging/appointments: n    Surgery packet: I mailed packet     Additional comments: post ops scheduled        Maribeth Mi on 9/22/2023 at 3:14 PM

## 2023-09-23 PROBLEM — C61 PROSTATE CANCER (H): Status: ACTIVE | Noted: 2023-09-23

## 2023-10-09 ENCOUNTER — OFFICE VISIT (OUTPATIENT)
Dept: FAMILY MEDICINE | Facility: CLINIC | Age: 73
End: 2023-10-09
Payer: MEDICARE

## 2023-10-09 VITALS
OXYGEN SATURATION: 99 % | HEIGHT: 66 IN | RESPIRATION RATE: 16 BRPM | DIASTOLIC BLOOD PRESSURE: 74 MMHG | SYSTOLIC BLOOD PRESSURE: 120 MMHG | WEIGHT: 148 LBS | HEART RATE: 76 BPM | BODY MASS INDEX: 23.78 KG/M2

## 2023-10-09 DIAGNOSIS — I10 BENIGN ESSENTIAL HYPERTENSION: ICD-10-CM

## 2023-10-09 DIAGNOSIS — E78.5 HYPERLIPIDEMIA LDL GOAL <130: ICD-10-CM

## 2023-10-09 DIAGNOSIS — C61 PROSTATE CANCER (H): ICD-10-CM

## 2023-10-09 DIAGNOSIS — E87.1 HYPONATREMIA: ICD-10-CM

## 2023-10-09 DIAGNOSIS — Z01.818 PREOP GENERAL PHYSICAL EXAM: Primary | ICD-10-CM

## 2023-10-09 DIAGNOSIS — Z23 ENCOUNTER FOR ADMINISTRATION OF VACCINE: ICD-10-CM

## 2023-10-09 LAB
ANION GAP SERPL CALCULATED.3IONS-SCNC: 10 MMOL/L (ref 7–15)
BUN SERPL-MCNC: 6.9 MG/DL (ref 8–23)
CALCIUM SERPL-MCNC: 9.8 MG/DL (ref 8.8–10.2)
CHLORIDE SERPL-SCNC: 102 MMOL/L (ref 98–107)
CREAT SERPL-MCNC: 0.92 MG/DL (ref 0.67–1.17)
DEPRECATED HCO3 PLAS-SCNC: 28 MMOL/L (ref 22–29)
EGFRCR SERPLBLD CKD-EPI 2021: 88 ML/MIN/1.73M2
ERYTHROCYTE [DISTWIDTH] IN BLOOD BY AUTOMATED COUNT: 12.4 % (ref 10–15)
GLUCOSE SERPL-MCNC: 104 MG/DL (ref 70–99)
HCT VFR BLD AUTO: 38.2 % (ref 40–53)
HGB BLD-MCNC: 13.2 G/DL (ref 13.3–17.7)
MCH RBC QN AUTO: 32.6 PG (ref 26.5–33)
MCHC RBC AUTO-ENTMCNC: 34.6 G/DL (ref 31.5–36.5)
MCV RBC AUTO: 94 FL (ref 78–100)
PLATELET # BLD AUTO: 296 10E3/UL (ref 150–450)
POTASSIUM SERPL-SCNC: 3.8 MMOL/L (ref 3.4–5.3)
RBC # BLD AUTO: 4.05 10E6/UL (ref 4.4–5.9)
SODIUM SERPL-SCNC: 140 MMOL/L (ref 135–145)
WBC # BLD AUTO: 5.8 10E3/UL (ref 4–11)

## 2023-10-09 PROCEDURE — 93000 ELECTROCARDIOGRAM COMPLETE: CPT | Performed by: FAMILY MEDICINE

## 2023-10-09 PROCEDURE — 85027 COMPLETE CBC AUTOMATED: CPT | Performed by: FAMILY MEDICINE

## 2023-10-09 PROCEDURE — 36415 COLL VENOUS BLD VENIPUNCTURE: CPT | Performed by: FAMILY MEDICINE

## 2023-10-09 PROCEDURE — 90662 IIV NO PRSV INCREASED AG IM: CPT | Performed by: FAMILY MEDICINE

## 2023-10-09 PROCEDURE — 99214 OFFICE O/P EST MOD 30 MIN: CPT | Mod: 25 | Performed by: FAMILY MEDICINE

## 2023-10-09 PROCEDURE — 80048 BASIC METABOLIC PNL TOTAL CA: CPT | Performed by: FAMILY MEDICINE

## 2023-10-09 PROCEDURE — G0008 ADMIN INFLUENZA VIRUS VAC: HCPCS | Performed by: FAMILY MEDICINE

## 2023-10-09 PROCEDURE — 91320 SARSCV2 VAC 30MCG TRS-SUC IM: CPT | Performed by: FAMILY MEDICINE

## 2023-10-09 PROCEDURE — 90480 ADMN SARSCOV2 VAC 1/ONLY CMP: CPT | Performed by: FAMILY MEDICINE

## 2023-10-09 ASSESSMENT — PAIN SCALES - GENERAL: PAINLEVEL: NO PAIN (0)

## 2023-10-09 NOTE — PROGRESS NOTES
[facility]  [address] [place] [address]  Phone: [phone]  Fax: [phone]  Primary Provider: Jose Coles  Pre-op Performing Provider: DWAYNE VILLANUEVA      PREOPERATIVE EVALUATION:  Today's date: 10/9/2023    Duane is a 73 year old male who presents for a preoperative evaluation.      10/9/2023     9:02 AM   Additional Questions   Roomed by Linh KIRBY   Accompanied by Self       Surgical Information:  Surgery/Procedure: ROBOTIC-ASSISTED LAPAROSCOPIC RADICAL PROSTATECTOMY WITH PELVIC LYMPHADENECTOMY   Surgery Location: [facility]  Surgeon:   Surgery Date: 10/27/2023  Time of Surgery: TBD  Where patient plans to recover: Other: stay 1 night  Fax number for surgical facility: Note does not need to be faxed, will be available electronically in Epic.    Assessment & Plan     The proposed surgical procedure is considered INTERMEDIATE risk.      ICD-10-CM    1. Preop general physical exam  Z01.818       2. Prostate cancer (H)  C61       3. Benign essential hypertension  I10 CBC with platelets     Basic metabolic panel  (Ca, Cl, CO2, Creat, Gluc, K, Na, BUN)     EKG 12-lead complete w/read - Clinics      4. Hyperlipidemia LDL goal <130  E78.5       5. Encounter for administration of vaccine  Z23            - No identified additional risk factors other than previously addressed    Antiplatelet or Anticoagulation Medication Instructions:   - aspirin: Discontinue aspirin 7 days prior to procedure to reduce bleeding risk. It should be resumed postoperatively.     Additional Medication Instructions:  Patient is to take all scheduled medications on the day of surgery    RECOMMENDATION:  APPROVAL GIVEN to proceed with proposed procedure, without further diagnostic evaluation.      Subjective   HPI related to upcoming procedure:   73-year-old male presents for a preop physical exam.  Patient is scheduled to have radical prostatectomy on October 27, 2023. He requires  evaluation and anesthesia risk assessment prior to undergoing surgery/procedure.  Patient denies any fever, chills, sore throat, cough, shortness of breath, chest pain, palpitation, diarrhea, constipation, abdominal pain, headache or other relevant systemic symptoms.           10/3/2023     6:21 PM   Preop Questions   1. Have you ever had a heart attack or stroke? No   2. Have you ever had surgery on your heart or blood vessels, such as a stent placement, a coronary artery bypass, or surgery on an artery in your head, neck, heart, or legs? No   3. Do you have chest pain with activity? No   4. Do you have a history of  heart failure? No   5. Do you currently have a cold, bronchitis or symptoms of other infection? No   6. Do you have a cough, shortness of breath, or wheezing? No   7. Do you or anyone in your family have previous history of blood clots? No   8. Do you or does anyone in your family have a serious bleeding problem such as prolonged bleeding following surgeries or cuts? No   9. Have you ever had problems with anemia or been told to take iron pills? YES - taking iron pills   10. Have you had any abnormal blood loss such as black, tarry or bloody stools? No   11. Have you ever had a blood transfusion? No   12. Are you willing to have a blood transfusion if it is medically needed before, during, or after your surgery? Yes   13. Have you or any of your relatives ever had problems with anesthesia? No   14. Do you have sleep apnea, excessive snoring or daytime drowsiness? No   15. Do you have any artifical heart valves or other implanted medical devices like a pacemaker, defibrillator, or continuous glucose monitor? No   16. Do you have artificial joints? No   17. Are you allergic to latex? No       Preoperative Review of :   reviewed - no record of controlled substances prescribed.      Status of Chronic Conditions:  See problem list for active medical problems.  Problems all longstanding and stable,  except as noted/documented.  See ROS for pertinent symptoms related to these conditions.    Review of Systems  CONSTITUTIONAL: NEGATIVE for fever, chills, change in weight  INTEGUMENTARY/SKIN: NEGATIVE for worrisome rashes, moles or lesions  EYES: NEGATIVE for vision changes or irritation  ENT/MOUTH: NEGATIVE for ear, mouth and throat problems  RESP: NEGATIVE for significant cough or SOB  CV: NEGATIVE for chest pain, palpitations or peripheral edema  GI: NEGATIVE for nausea, abdominal pain, heartburn, or change in bowel habits  : NEGATIVE for frequency, dysuria, or hematuria  MUSCULOSKELETAL: NEGATIVE for significant arthralgias or myalgia  NEURO: NEGATIVE for weakness, dizziness or paresthesias  ENDOCRINE: NEGATIVE for temperature intolerance, skin/hair changes  HEME: NEGATIVE for bleeding problems  PSYCHIATRIC: NEGATIVE for changes in mood or affect    Patient Active Problem List    Diagnosis Date Noted    Prostate cancer (H) 09/23/2023     Priority: Medium    Hyperlipidemia LDL goal <130 01/26/2016     Priority: Medium    Benign essential hypertension 01/26/2016     Priority: Medium      Past Medical History:   Diagnosis Date    Hypertension      Past Surgical History:   Procedure Laterality Date    COLONOSCOPY N/A 3/23/2023    Procedure: COLONOSCOPY, FLEXIBLE, WITH LESION REMOVAL USING SNARE;  Surgeon: Jose Faustin MD;  Location: [facility]     Current Outpatient Medications   Medication Sig Dispense Refill    amLODIPine (NORVASC) 5 MG tablet Take 1 tablet (5 mg) by mouth daily 90 tablet 1    aspirin (ASA) 81 MG EC tablet Take 1 tablet (81 mg) by mouth daily 90 tablet 3    atorvastatin (LIPITOR) 20 MG tablet Take 1 tablet (20 mg) by mouth daily 90 tablet 1    Multiple Vitamins-Minerals (MULTIVITAMIN ADULT EXTRA C) CHEW       vitamin C (ASCORBIC ACID) 1000 MG TABS Take 1,000 mg by mouth         No Known Allergies     Social History     Tobacco Use    Smoking status: Every Day     Packs/day: 0.25      "Types: Cigarettes     Passive exposure: Current    Smokeless tobacco: Never   Substance Use Topics    Alcohol use: Yes     Comment: occas     Family History   Problem Relation Age of Onset    Other Cancer Mother     Obesity Mother     Cervical Cancer Sister     GI problems Father     Other Cancer Sister      History   Drug Use No         Objective     /74 (BP Location: Right arm, Patient Position: Sitting, Cuff Size: Adult Regular)   Pulse 76   Resp 16   Ht 1.676 m (5' 6\")   Wt 67.1 kg (148 lb)   SpO2 99%   BMI 23.89 kg/m      Physical Exam    GENERAL APPEARANCE: alert, active, and no distress     EYES: EOMI,  PERRL     HENT: ear canals and TM's normal and nose and mouth without ulcers or lesions     NECK: no adenopathy, no asymmetry, masses, or scars and thyroid normal to palpation     RESP: lungs clear to auscultation - no rales, rhonchi or wheezes     CV: regular rates and rhythm, normal S1 S2, no S3 or S4 and no murmur, click or rub     ABDOMEN:  soft, nontender, no HSM or masses and bowel sounds normal     MS: extremities normal- no gross deformities noted, no evidence of inflammation in joints, FROM in all extremities.     SKIN: no suspicious lesions or rashes     NEURO: Normal strength and tone, sensory exam grossly normal, mentation intact and speech normal     PSYCH: mentation appears normal. and affect normal/bright     LYMPHATICS: No cervical adenopathy    Recent Labs   Lab Test 04/06/23  1410 03/13/23  0905   HGB 12.9* 12.6*    308   * 139   POTASSIUM 3.9 4.8   CR 0.87 1.05   A1C  --  4.8        Diagnostics:  Labs pending at this time.  Results will be reviewed when available.   EKG: appears normal, NSR, normal axis, normal intervals, no acute ST/T changes c/w ischemia, no LVH by voltage criteria    Revised Cardiac Risk Index (RCRI):  The patient has the following serious cardiovascular risks for perioperative complications:   - No serious cardiac risks = 0 points     RCRI " Interpretation: 0 points: Class I (very low risk - 0.4% complication rate)         Signed Electronically by: Davey Randolph MD  Copy of this evaluation report is provided to requesting physician.

## 2023-10-09 NOTE — PATIENT INSTRUCTIONS
- aspirin: Discontinue aspirin 7 days prior to procedure to reduce bleeding risk. It should be resumed postoperatively.

## 2023-10-09 NOTE — LETTER
October 16, 2023      Duane C Johnson  [address] [address] [place] [id number]        Dear ,    We are writing to inform you of your test results.    Lab results came back unremarkable.     Let us know if there are any questions.    Resulted Orders   CBC with platelets   Result Value Ref Range    WBC Count 5.8 4.0 - 11.0 10e3/uL    RBC Count 4.05 (L) 4.40 - 5.90 10e6/uL    Hemoglobin 13.2 (L) 13.3 - 17.7 g/dL    Hematocrit 38.2 (L) 40.0 - 53.0 %    MCV 94 78 - 100 fL    MCH 32.6 26.5 - 33.0 pg    MCHC 34.6 31.5 - 36.5 g/dL    RDW 12.4 10.0 - 15.0 %    Platelet Count 296 150 - 450 10e3/uL       If you have any questions or concerns, please call the clinic at the number listed above.       Sincerely,      Davey Randolph MD

## 2023-10-11 ENCOUNTER — CARE COORDINATION (OUTPATIENT)
Dept: UROLOGY | Facility: CLINIC | Age: 73
End: 2023-10-11
Payer: MEDICARE

## 2023-10-26 ENCOUNTER — HOSPITAL ENCOUNTER (INPATIENT)
Facility: CLINIC | Age: 73
LOS: 8 days | Discharge: NURSING, REHAB OR LONG-TERM CARE | DRG: 321 | End: 2023-11-03
Attending: EMERGENCY MEDICINE | Admitting: INTERNAL MEDICINE
Payer: MEDICARE

## 2023-10-26 ENCOUNTER — APPOINTMENT (OUTPATIENT)
Dept: OCCUPATIONAL THERAPY | Facility: CLINIC | Age: 73
DRG: 321 | End: 2023-10-26
Attending: STUDENT IN AN ORGANIZED HEALTH CARE EDUCATION/TRAINING PROGRAM
Payer: MEDICARE

## 2023-10-26 ENCOUNTER — APPOINTMENT (OUTPATIENT)
Dept: GENERAL RADIOLOGY | Facility: CLINIC | Age: 73
DRG: 321 | End: 2023-10-26
Attending: EMERGENCY MEDICINE
Payer: MEDICARE

## 2023-10-26 ENCOUNTER — APPOINTMENT (OUTPATIENT)
Dept: CARDIOLOGY | Facility: CLINIC | Age: 73
DRG: 321 | End: 2023-10-26
Attending: STUDENT IN AN ORGANIZED HEALTH CARE EDUCATION/TRAINING PROGRAM
Payer: MEDICARE

## 2023-10-26 DIAGNOSIS — I10 ESSENTIAL HYPERTENSION, MALIGNANT: ICD-10-CM

## 2023-10-26 DIAGNOSIS — I21.02 ST ELEVATION MYOCARDIAL INFARCTION INVOLVING LEFT ANTERIOR DESCENDING (LAD) CORONARY ARTERY (H): Primary | ICD-10-CM

## 2023-10-26 DIAGNOSIS — I46.9 CARDIAC ARREST (H): ICD-10-CM

## 2023-10-26 DIAGNOSIS — I25.750 CORONARY ARTERY DISEASE INVOLVING NATIVE ARTERY OF TRANSPLANTED HEART WITH UNSTABLE ANGINA PECTORIS (H): ICD-10-CM

## 2023-10-26 DIAGNOSIS — F17.210 CIGARETTE SMOKER: ICD-10-CM

## 2023-10-26 DIAGNOSIS — I21.3 ST ELEVATION MYOCARDIAL INFARCTION (STEMI), UNSPECIFIED ARTERY (H): ICD-10-CM

## 2023-10-26 DIAGNOSIS — I21.3 STEMI (ST ELEVATION MYOCARDIAL INFARCTION) (H): ICD-10-CM

## 2023-10-26 DIAGNOSIS — I21.3 ST ELEVATION MI (STEMI) (H): ICD-10-CM

## 2023-10-26 DIAGNOSIS — I50.21 ACUTE SYSTOLIC HEART FAILURE (H): ICD-10-CM

## 2023-10-26 DIAGNOSIS — G47.00 INSOMNIA, UNSPECIFIED TYPE: ICD-10-CM

## 2023-10-26 LAB
ACT BLD: 214 SECONDS (ref 74–150)
ACT BLD: 251 SECONDS (ref 74–150)
ALBUMIN SERPL BCG-MCNC: 3.5 G/DL (ref 3.5–5.2)
ALP SERPL-CCNC: 85 U/L (ref 40–129)
ALP SERPL-CCNC: 86 U/L (ref 40–129)
ALP SERPL-CCNC: 89 U/L (ref 40–129)
ALP SERPL-CCNC: 91 U/L (ref 40–129)
ALT SERPL W P-5'-P-CCNC: 29 U/L (ref 0–70)
ALT SERPL W P-5'-P-CCNC: 59 U/L (ref 0–70)
ALT SERPL W P-5'-P-CCNC: 70 U/L (ref 0–70)
ALT SERPL W P-5'-P-CCNC: 71 U/L (ref 0–70)
ANION GAP SERPL CALCULATED.3IONS-SCNC: 10 MMOL/L (ref 7–15)
ANION GAP SERPL CALCULATED.3IONS-SCNC: 11 MMOL/L (ref 7–15)
ANION GAP SERPL CALCULATED.3IONS-SCNC: 9 MMOL/L (ref 7–15)
APTT PPP: 29 SECONDS (ref 22–38)
AST SERPL W P-5'-P-CCNC: 211 U/L (ref 0–45)
AST SERPL W P-5'-P-CCNC: 537 U/L (ref 0–45)
AST SERPL W P-5'-P-CCNC: 559 U/L (ref 0–45)
AST SERPL W P-5'-P-CCNC: 586 U/L (ref 0–45)
ATRIAL RATE - MUSE: 78 BPM
BASOPHILS # BLD AUTO: 0.1 10E3/UL (ref 0–0.2)
BASOPHILS NFR BLD AUTO: 1 %
BI-PLANE LVEF ECHO: NORMAL
BILIRUB SERPL-MCNC: 0.3 MG/DL
BILIRUB SERPL-MCNC: 0.5 MG/DL
BILIRUB SERPL-MCNC: 0.6 MG/DL
BILIRUB SERPL-MCNC: 0.6 MG/DL
BUN SERPL-MCNC: 11.2 MG/DL (ref 8–23)
BUN SERPL-MCNC: 6.7 MG/DL (ref 8–23)
BUN SERPL-MCNC: 6.8 MG/DL (ref 8–23)
BUN SERPL-MCNC: 7.3 MG/DL (ref 8–23)
BUN SERPL-MCNC: 9.2 MG/DL (ref 8–23)
CALCIUM SERPL-MCNC: 8.4 MG/DL (ref 8.8–10.2)
CALCIUM SERPL-MCNC: 8.5 MG/DL (ref 8.8–10.2)
CALCIUM SERPL-MCNC: 8.6 MG/DL (ref 8.8–10.2)
CALCIUM SERPL-MCNC: 8.7 MG/DL (ref 8.8–10.2)
CALCIUM SERPL-MCNC: 9.5 MG/DL (ref 8.8–10.2)
CHLORIDE SERPL-SCNC: 101 MMOL/L (ref 98–107)
CHLORIDE SERPL-SCNC: 103 MMOL/L (ref 98–107)
CHLORIDE SERPL-SCNC: 103 MMOL/L (ref 98–107)
CHLORIDE SERPL-SCNC: 104 MMOL/L (ref 98–107)
CHLORIDE SERPL-SCNC: 97 MMOL/L (ref 98–107)
CHOLEST SERPL-MCNC: 117 MG/DL
CHOLEST SERPL-MCNC: 96 MG/DL
CREAT BLD-MCNC: 0.8 MG/DL (ref 0.7–1.3)
CREAT SERPL-MCNC: 0.79 MG/DL (ref 0.67–1.17)
CREAT SERPL-MCNC: 0.84 MG/DL (ref 0.67–1.17)
CREAT SERPL-MCNC: 0.86 MG/DL (ref 0.67–1.17)
CREAT SERPL-MCNC: 0.91 MG/DL (ref 0.67–1.17)
CREAT SERPL-MCNC: 0.91 MG/DL (ref 0.67–1.17)
DEPRECATED HCO3 PLAS-SCNC: 23 MMOL/L (ref 22–29)
DEPRECATED HCO3 PLAS-SCNC: 24 MMOL/L (ref 22–29)
DEPRECATED HCO3 PLAS-SCNC: 28 MMOL/L (ref 22–29)
DIASTOLIC BLOOD PRESSURE - MUSE: NORMAL MMHG
EGFRCR SERPLBLD CKD-EPI 2021: 89 ML/MIN/1.73M2
EGFRCR SERPLBLD CKD-EPI 2021: 89 ML/MIN/1.73M2
EGFRCR SERPLBLD CKD-EPI 2021: >60 ML/MIN/1.73M2
EGFRCR SERPLBLD CKD-EPI 2021: >90 ML/MIN/1.73M2
EOSINOPHIL # BLD AUTO: 0.2 10E3/UL (ref 0–0.7)
EOSINOPHIL NFR BLD AUTO: 1 %
ERYTHROCYTE [DISTWIDTH] IN BLOOD BY AUTOMATED COUNT: 12.6 % (ref 10–15)
ERYTHROCYTE [DISTWIDTH] IN BLOOD BY AUTOMATED COUNT: 12.7 % (ref 10–15)
ERYTHROCYTE [DISTWIDTH] IN BLOOD BY AUTOMATED COUNT: 12.8 % (ref 10–15)
GLUCOSE BLDC GLUCOMTR-MCNC: 125 MG/DL (ref 70–99)
GLUCOSE BLDC GLUCOMTR-MCNC: 149 MG/DL (ref 70–99)
GLUCOSE BLDC GLUCOMTR-MCNC: 161 MG/DL (ref 70–99)
GLUCOSE SERPL-MCNC: 130 MG/DL (ref 70–99)
GLUCOSE SERPL-MCNC: 131 MG/DL (ref 70–99)
GLUCOSE SERPL-MCNC: 136 MG/DL (ref 70–99)
GLUCOSE SERPL-MCNC: 158 MG/DL (ref 70–99)
GLUCOSE SERPL-MCNC: 161 MG/DL (ref 70–99)
HCT VFR BLD AUTO: 32.7 % (ref 40–53)
HCT VFR BLD AUTO: 35.8 % (ref 40–53)
HCT VFR BLD AUTO: 41.1 % (ref 40–53)
HDLC SERPL-MCNC: 30 MG/DL
HDLC SERPL-MCNC: 32 MG/DL
HGB BLD-MCNC: 11.7 G/DL (ref 13.3–17.7)
HGB BLD-MCNC: 12.5 G/DL (ref 13.3–17.7)
HGB BLD-MCNC: 14.4 G/DL (ref 13.3–17.7)
IMM GRANULOCYTES # BLD: 0 10E3/UL
IMM GRANULOCYTES NFR BLD: 0 %
INR PPP: 1.1 (ref 0.85–1.15)
INTERPRETATION ECG - MUSE: NORMAL
LACTATE SERPL-SCNC: 1 MMOL/L (ref 0.7–2)
LACTATE SERPL-SCNC: 1.3 MMOL/L (ref 0.7–2)
LACTATE SERPL-SCNC: 1.4 MMOL/L (ref 0.7–2)
LACTATE SERPL-SCNC: 1.8 MMOL/L (ref 0.7–2)
LDLC SERPL CALC-MCNC: 60 MG/DL
LDLC SERPL CALC-MCNC: 67 MG/DL
LVEF ECHO: NORMAL
LYMPHOCYTES # BLD AUTO: 2.3 10E3/UL (ref 0.8–5.3)
LYMPHOCYTES NFR BLD AUTO: 18 %
MAGNESIUM SERPL-MCNC: 1.8 MG/DL (ref 1.7–2.3)
MAGNESIUM SERPL-MCNC: 1.9 MG/DL (ref 1.7–2.3)
MCH RBC QN AUTO: 32.3 PG (ref 26.5–33)
MCH RBC QN AUTO: 32.6 PG (ref 26.5–33)
MCH RBC QN AUTO: 33.1 PG (ref 26.5–33)
MCHC RBC AUTO-ENTMCNC: 34.9 G/DL (ref 31.5–36.5)
MCHC RBC AUTO-ENTMCNC: 35 G/DL (ref 31.5–36.5)
MCHC RBC AUTO-ENTMCNC: 35.8 G/DL (ref 31.5–36.5)
MCV RBC AUTO: 92 FL (ref 78–100)
MCV RBC AUTO: 93 FL (ref 78–100)
MCV RBC AUTO: 93 FL (ref 78–100)
MONOCYTES # BLD AUTO: 0.6 10E3/UL (ref 0–1.3)
MONOCYTES NFR BLD AUTO: 5 %
NEUTROPHILS # BLD AUTO: 9.6 10E3/UL (ref 1.6–8.3)
NEUTROPHILS NFR BLD AUTO: 75 %
NONHDLC SERPL-MCNC: 66 MG/DL
NONHDLC SERPL-MCNC: 85 MG/DL
NRBC # BLD AUTO: 0 10E3/UL
NRBC BLD AUTO-RTO: 0 /100
P AXIS - MUSE: 52 DEGREES
PHOSPHATE SERPL-MCNC: 2.6 MG/DL (ref 2.5–4.5)
PHOSPHATE SERPL-MCNC: 2.8 MG/DL (ref 2.5–4.5)
PLATELET # BLD AUTO: 278 10E3/UL (ref 150–450)
PLATELET # BLD AUTO: 348 10E3/UL (ref 150–450)
PLATELET # BLD AUTO: 350 10E3/UL (ref 150–450)
POTASSIUM SERPL-SCNC: 3 MMOL/L (ref 3.4–5.3)
POTASSIUM SERPL-SCNC: 3.7 MMOL/L (ref 3.4–5.3)
POTASSIUM SERPL-SCNC: 4.2 MMOL/L (ref 3.4–5.3)
POTASSIUM SERPL-SCNC: 4.4 MMOL/L (ref 3.4–5.3)
POTASSIUM SERPL-SCNC: 4.5 MMOL/L (ref 3.4–5.3)
PR INTERVAL - MUSE: 204 MS
PROT SERPL-MCNC: 6.2 G/DL (ref 6.4–8.3)
PROT SERPL-MCNC: 6.2 G/DL (ref 6.4–8.3)
PROT SERPL-MCNC: 6.3 G/DL (ref 6.4–8.3)
PROT SERPL-MCNC: 6.6 G/DL (ref 6.4–8.3)
QRS DURATION - MUSE: 160 MS
QT - MUSE: 480 MS
QTC - MUSE: 547 MS
R AXIS - MUSE: 62 DEGREES
RBC # BLD AUTO: 3.54 10E6/UL (ref 4.4–5.9)
RBC # BLD AUTO: 3.87 10E6/UL (ref 4.4–5.9)
RBC # BLD AUTO: 4.42 10E6/UL (ref 4.4–5.9)
SODIUM SERPL-SCNC: 135 MMOL/L (ref 135–145)
SODIUM SERPL-SCNC: 136 MMOL/L (ref 135–145)
SODIUM SERPL-SCNC: 136 MMOL/L (ref 135–145)
SYSTOLIC BLOOD PRESSURE - MUSE: NORMAL MMHG
T AXIS - MUSE: -9 DEGREES
TRIGL SERPL-MCNC: 29 MG/DL
TRIGL SERPL-MCNC: 89 MG/DL
TROPONIN T SERPL HS-MCNC: 19 NG/L
TROPONIN T SERPL HS-MCNC: 2436 NG/L
TROPONIN T SERPL HS-MCNC: ABNORMAL NG/L
VENTRICULAR RATE- MUSE: 78 BPM
WBC # BLD AUTO: 12.2 10E3/UL (ref 4–11)
WBC # BLD AUTO: 12.8 10E3/UL (ref 4–11)
WBC # BLD AUTO: 15.1 10E3/UL (ref 4–11)

## 2023-10-26 PROCEDURE — 93458 L HRT ARTERY/VENTRICLE ANGIO: CPT | Mod: 26 | Performed by: INTERNAL MEDICINE

## 2023-10-26 PROCEDURE — 250N000011 HC RX IP 250 OP 636

## 2023-10-26 PROCEDURE — 250N000013 HC RX MED GY IP 250 OP 250 PS 637: Performed by: INTERNAL MEDICINE

## 2023-10-26 PROCEDURE — 84100 ASSAY OF PHOSPHORUS: CPT | Performed by: INTERNAL MEDICINE

## 2023-10-26 PROCEDURE — 80053 COMPREHEN METABOLIC PANEL: CPT | Performed by: STUDENT IN AN ORGANIZED HEALTH CARE EDUCATION/TRAINING PROGRAM

## 2023-10-26 PROCEDURE — 83735 ASSAY OF MAGNESIUM: CPT | Performed by: INTERNAL MEDICINE

## 2023-10-26 PROCEDURE — 99153 MOD SED SAME PHYS/QHP EA: CPT | Performed by: INTERNAL MEDICINE

## 2023-10-26 PROCEDURE — 36556 INSERT NON-TUNNEL CV CATH: CPT | Mod: GC | Performed by: INTERNAL MEDICINE

## 2023-10-26 PROCEDURE — 85610 PROTHROMBIN TIME: CPT | Performed by: EMERGENCY MEDICINE

## 2023-10-26 PROCEDURE — 99291 CRITICAL CARE FIRST HOUR: CPT | Mod: 25 | Performed by: EMERGENCY MEDICINE

## 2023-10-26 PROCEDURE — 71045 X-RAY EXAM CHEST 1 VIEW: CPT | Mod: 26 | Performed by: STUDENT IN AN ORGANIZED HEALTH CARE EDUCATION/TRAINING PROGRAM

## 2023-10-26 PROCEDURE — 250N000013 HC RX MED GY IP 250 OP 250 PS 637: Performed by: STUDENT IN AN ORGANIZED HEALTH CARE EDUCATION/TRAINING PROGRAM

## 2023-10-26 PROCEDURE — 85025 COMPLETE CBC W/AUTO DIFF WBC: CPT | Performed by: EMERGENCY MEDICINE

## 2023-10-26 PROCEDURE — 92941 PRQ TRLML REVSC TOT OCCL AMI: CPT | Mod: LD | Performed by: INTERNAL MEDICINE

## 2023-10-26 PROCEDURE — 99152 MOD SED SAME PHYS/QHP 5/>YRS: CPT | Performed by: INTERNAL MEDICINE

## 2023-10-26 PROCEDURE — 93458 L HRT ARTERY/VENTRICLE ANGIO: CPT | Performed by: INTERNAL MEDICINE

## 2023-10-26 PROCEDURE — 85027 COMPLETE CBC AUTOMATED: CPT | Performed by: STUDENT IN AN ORGANIZED HEALTH CARE EDUCATION/TRAINING PROGRAM

## 2023-10-26 PROCEDURE — 97165 OT EVAL LOW COMPLEX 30 MIN: CPT | Mod: GO

## 2023-10-26 PROCEDURE — C1769 GUIDE WIRE: HCPCS | Performed by: INTERNAL MEDICINE

## 2023-10-26 PROCEDURE — 84484 ASSAY OF TROPONIN QUANT: CPT | Performed by: STUDENT IN AN ORGANIZED HEALTH CARE EDUCATION/TRAINING PROGRAM

## 2023-10-26 PROCEDURE — C1751 CATH, INF, PER/CENT/MIDLINE: HCPCS | Performed by: INTERNAL MEDICINE

## 2023-10-26 PROCEDURE — 272N000001 HC OR GENERAL SUPPLY STERILE: Performed by: INTERNAL MEDICINE

## 2023-10-26 PROCEDURE — 85730 THROMBOPLASTIN TIME PARTIAL: CPT | Performed by: EMERGENCY MEDICINE

## 2023-10-26 PROCEDURE — 84450 TRANSFERASE (AST) (SGOT): CPT | Performed by: STUDENT IN AN ORGANIZED HEALTH CARE EDUCATION/TRAINING PROGRAM

## 2023-10-26 PROCEDURE — C1894 INTRO/SHEATH, NON-LASER: HCPCS | Performed by: INTERNAL MEDICINE

## 2023-10-26 PROCEDURE — 80061 LIPID PANEL: CPT | Performed by: STUDENT IN AN ORGANIZED HEALTH CARE EDUCATION/TRAINING PROGRAM

## 2023-10-26 PROCEDURE — 93306 TTE W/DOPPLER COMPLETE: CPT | Mod: 26 | Performed by: INTERNAL MEDICINE

## 2023-10-26 PROCEDURE — 93005 ELECTROCARDIOGRAM TRACING: CPT

## 2023-10-26 PROCEDURE — C1874 STENT, COATED/COV W/DEL SYS: HCPCS | Performed by: INTERNAL MEDICINE

## 2023-10-26 PROCEDURE — 85347 COAGULATION TIME ACTIVATED: CPT

## 2023-10-26 PROCEDURE — C1887 CATHETER, GUIDING: HCPCS | Performed by: INTERNAL MEDICINE

## 2023-10-26 PROCEDURE — 200N000002 HC R&B ICU UMMC

## 2023-10-26 PROCEDURE — 250N000009 HC RX 250: Performed by: INTERNAL MEDICINE

## 2023-10-26 PROCEDURE — 999N000208 ECHOCARDIOGRAM COMPLETE

## 2023-10-26 PROCEDURE — 97535 SELF CARE MNGMENT TRAINING: CPT | Mod: GO

## 2023-10-26 PROCEDURE — C1725 CATH, TRANSLUMIN NON-LASER: HCPCS | Performed by: INTERNAL MEDICINE

## 2023-10-26 PROCEDURE — 255N000002 HC RX 255 OP 636: Performed by: INTERNAL MEDICINE

## 2023-10-26 PROCEDURE — 97530 THERAPEUTIC ACTIVITIES: CPT | Mod: GO

## 2023-10-26 PROCEDURE — 84484 ASSAY OF TROPONIN QUANT: CPT | Performed by: EMERGENCY MEDICINE

## 2023-10-26 PROCEDURE — 250N000011 HC RX IP 250 OP 636: Mod: JZ | Performed by: STUDENT IN AN ORGANIZED HEALTH CARE EDUCATION/TRAINING PROGRAM

## 2023-10-26 PROCEDURE — 250N000013 HC RX MED GY IP 250 OP 250 PS 637: Performed by: EMERGENCY MEDICINE

## 2023-10-26 PROCEDURE — 250N000011 HC RX IP 250 OP 636: Performed by: EMERGENCY MEDICINE

## 2023-10-26 PROCEDURE — 36556 INSERT NON-TUNNEL CV CATH: CPT | Performed by: INTERNAL MEDICINE

## 2023-10-26 PROCEDURE — 80053 COMPREHEN METABOLIC PANEL: CPT | Performed by: EMERGENCY MEDICINE

## 2023-10-26 PROCEDURE — 250N000011 HC RX IP 250 OP 636: Performed by: INTERNAL MEDICINE

## 2023-10-26 PROCEDURE — 99152 MOD SED SAME PHYS/QHP 5/>YRS: CPT | Mod: GC | Performed by: INTERNAL MEDICINE

## 2023-10-26 PROCEDURE — C9606 PERC D-E COR REVASC W AMI S: HCPCS | Mod: LD | Performed by: INTERNAL MEDICINE

## 2023-10-26 PROCEDURE — 027034Z DILATION OF CORONARY ARTERY, ONE ARTERY WITH DRUG-ELUTING INTRALUMINAL DEVICE, PERCUTANEOUS APPROACH: ICD-10-PCS | Performed by: INTERNAL MEDICINE

## 2023-10-26 PROCEDURE — 93010 ELECTROCARDIOGRAM REPORT: CPT | Performed by: EMERGENCY MEDICINE

## 2023-10-26 PROCEDURE — 82565 ASSAY OF CREATININE: CPT

## 2023-10-26 PROCEDURE — 4A023N7 MEASUREMENT OF CARDIAC SAMPLING AND PRESSURE, LEFT HEART, PERCUTANEOUS APPROACH: ICD-10-PCS | Performed by: INTERNAL MEDICINE

## 2023-10-26 PROCEDURE — 84100 ASSAY OF PHOSPHORUS: CPT | Performed by: STUDENT IN AN ORGANIZED HEALTH CARE EDUCATION/TRAINING PROGRAM

## 2023-10-26 PROCEDURE — 84155 ASSAY OF PROTEIN SERUM: CPT | Performed by: STUDENT IN AN ORGANIZED HEALTH CARE EDUCATION/TRAINING PROGRAM

## 2023-10-26 PROCEDURE — 258N000003 HC RX IP 258 OP 636: Performed by: STUDENT IN AN ORGANIZED HEALTH CARE EDUCATION/TRAINING PROGRAM

## 2023-10-26 PROCEDURE — 83605 ASSAY OF LACTIC ACID: CPT | Performed by: STUDENT IN AN ORGANIZED HEALTH CARE EDUCATION/TRAINING PROGRAM

## 2023-10-26 PROCEDURE — 93005 ELECTROCARDIOGRAM TRACING: CPT | Performed by: EMERGENCY MEDICINE

## 2023-10-26 PROCEDURE — 83735 ASSAY OF MAGNESIUM: CPT | Performed by: STUDENT IN AN ORGANIZED HEALTH CARE EDUCATION/TRAINING PROGRAM

## 2023-10-26 PROCEDURE — 71045 X-RAY EXAM CHEST 1 VIEW: CPT

## 2023-10-26 PROCEDURE — B2111ZZ FLUOROSCOPY OF MULTIPLE CORONARY ARTERIES USING LOW OSMOLAR CONTRAST: ICD-10-PCS | Performed by: INTERNAL MEDICINE

## 2023-10-26 PROCEDURE — 36415 COLL VENOUS BLD VENIPUNCTURE: CPT | Performed by: EMERGENCY MEDICINE

## 2023-10-26 PROCEDURE — 250N000011 HC RX IP 250 OP 636: Mod: JZ | Performed by: INTERNAL MEDICINE

## 2023-10-26 DEVICE — STENT CORONARY DES SYNERGY XD MR US 2.50X28MM H7493941828250: Type: IMPLANTABLE DEVICE | Status: FUNCTIONAL

## 2023-10-26 DEVICE — CATH CENTRAL LINE MULTI LUMEN 7FRX20CM CDC-45703-XP1A: Type: IMPLANTABLE DEVICE | Status: FUNCTIONAL

## 2023-10-26 RX ORDER — POTASSIUM CHLORIDE 750 MG/1
40 TABLET, EXTENDED RELEASE ORAL ONCE
Status: COMPLETED | OUTPATIENT
Start: 2023-10-26 | End: 2023-10-26

## 2023-10-26 RX ORDER — FUROSEMIDE 10 MG/ML
INJECTION INTRAMUSCULAR; INTRAVENOUS
Status: DISCONTINUED | OUTPATIENT
Start: 2023-10-26 | End: 2023-10-26 | Stop reason: HOSPADM

## 2023-10-26 RX ORDER — NOREPINEPHRINE BITARTRATE 0.06 MG/ML
.01-.6 INJECTION, SOLUTION INTRAVENOUS CONTINUOUS
Status: DISCONTINUED | OUTPATIENT
Start: 2023-10-26 | End: 2023-10-27

## 2023-10-26 RX ORDER — SODIUM CHLORIDE 9 MG/ML
INJECTION, SOLUTION INTRAVENOUS CONTINUOUS
Status: DISCONTINUED | OUTPATIENT
Start: 2023-10-26 | End: 2023-10-26

## 2023-10-26 RX ORDER — MAGNESIUM OXIDE 400 MG/1
400 TABLET ORAL EVERY 4 HOURS
Status: COMPLETED | OUTPATIENT
Start: 2023-10-26 | End: 2023-10-26

## 2023-10-26 RX ORDER — NALOXONE HYDROCHLORIDE 0.4 MG/ML
0.2 INJECTION, SOLUTION INTRAMUSCULAR; INTRAVENOUS; SUBCUTANEOUS
Status: DISCONTINUED | OUTPATIENT
Start: 2023-10-26 | End: 2023-11-03 | Stop reason: HOSPADM

## 2023-10-26 RX ORDER — METOPROLOL TARTRATE 1 MG/ML
5 INJECTION, SOLUTION INTRAVENOUS
Status: DISCONTINUED | OUTPATIENT
Start: 2023-10-26 | End: 2023-10-27

## 2023-10-26 RX ORDER — FENTANYL CITRATE 50 UG/ML
INJECTION, SOLUTION INTRAMUSCULAR; INTRAVENOUS
Status: DISCONTINUED | OUTPATIENT
Start: 2023-10-26 | End: 2023-10-26 | Stop reason: HOSPADM

## 2023-10-26 RX ORDER — NALOXONE HYDROCHLORIDE 0.4 MG/ML
0.2 INJECTION, SOLUTION INTRAMUSCULAR; INTRAVENOUS; SUBCUTANEOUS
Status: DISCONTINUED | OUTPATIENT
Start: 2023-10-26 | End: 2023-10-26

## 2023-10-26 RX ORDER — ONDANSETRON 2 MG/ML
4 INJECTION INTRAMUSCULAR; INTRAVENOUS EVERY 6 HOURS PRN
Status: DISCONTINUED | OUTPATIENT
Start: 2023-10-26 | End: 2023-11-03 | Stop reason: HOSPADM

## 2023-10-26 RX ORDER — HYDRALAZINE HYDROCHLORIDE 20 MG/ML
10 INJECTION INTRAMUSCULAR; INTRAVENOUS EVERY 4 HOURS PRN
Status: DISCONTINUED | OUTPATIENT
Start: 2023-10-26 | End: 2023-10-26

## 2023-10-26 RX ORDER — HEPARIN SODIUM 5000 [USP'U]/.5ML
5000 INJECTION, SOLUTION INTRAVENOUS; SUBCUTANEOUS EVERY 12 HOURS
Status: DISCONTINUED | OUTPATIENT
Start: 2023-10-26 | End: 2023-10-27

## 2023-10-26 RX ORDER — NICARDIPINE HYDROCHLORIDE 2.5 MG/ML
INJECTION INTRAVENOUS
Status: DISCONTINUED | OUTPATIENT
Start: 2023-10-26 | End: 2023-10-26 | Stop reason: HOSPADM

## 2023-10-26 RX ORDER — MULTIPLE VITAMINS W/ MINERALS TAB 9MG-400MCG
1 TAB ORAL 2 TIMES DAILY
Status: ON HOLD | COMMUNITY

## 2023-10-26 RX ORDER — NALOXONE HYDROCHLORIDE 0.4 MG/ML
0.4 INJECTION, SOLUTION INTRAMUSCULAR; INTRAVENOUS; SUBCUTANEOUS
Status: DISCONTINUED | OUTPATIENT
Start: 2023-10-26 | End: 2023-10-26

## 2023-10-26 RX ORDER — NALOXONE HYDROCHLORIDE 0.4 MG/ML
0.4 INJECTION, SOLUTION INTRAMUSCULAR; INTRAVENOUS; SUBCUTANEOUS
Status: ACTIVE | OUTPATIENT
Start: 2023-10-26 | End: 2023-10-26

## 2023-10-26 RX ORDER — CETIRIZINE HYDROCHLORIDE 10 MG/1
10 TABLET ORAL DAILY
Status: ON HOLD | COMMUNITY
End: 2024-10-05

## 2023-10-26 RX ORDER — NALOXONE HYDROCHLORIDE 0.4 MG/ML
0.2 INJECTION, SOLUTION INTRAMUSCULAR; INTRAVENOUS; SUBCUTANEOUS
Status: ACTIVE | OUTPATIENT
Start: 2023-10-26 | End: 2023-10-26

## 2023-10-26 RX ORDER — MAGNESIUM OXIDE 400 MG/1
400 TABLET ORAL EVERY 4 HOURS
Status: COMPLETED | OUTPATIENT
Start: 2023-10-26 | End: 2023-10-27

## 2023-10-26 RX ORDER — ATORVASTATIN CALCIUM 80 MG/1
80 TABLET, FILM COATED ORAL DAILY
Status: DISCONTINUED | OUTPATIENT
Start: 2023-10-26 | End: 2023-11-03 | Stop reason: HOSPADM

## 2023-10-26 RX ORDER — LIDOCAINE 40 MG/G
CREAM TOPICAL
Status: DISCONTINUED | OUTPATIENT
Start: 2023-10-26 | End: 2023-11-03 | Stop reason: HOSPADM

## 2023-10-26 RX ORDER — MAGNESIUM HYDROXIDE/ALUMINUM HYDROXICE/SIMETHICONE 120; 1200; 1200 MG/30ML; MG/30ML; MG/30ML
30 SUSPENSION ORAL EVERY 4 HOURS PRN
Status: DISCONTINUED | OUTPATIENT
Start: 2023-10-26 | End: 2023-11-03 | Stop reason: HOSPADM

## 2023-10-26 RX ORDER — NALOXONE HYDROCHLORIDE 0.4 MG/ML
0.4 INJECTION, SOLUTION INTRAMUSCULAR; INTRAVENOUS; SUBCUTANEOUS
Status: DISCONTINUED | OUTPATIENT
Start: 2023-10-26 | End: 2023-11-03 | Stop reason: HOSPADM

## 2023-10-26 RX ORDER — ATORVASTATIN CALCIUM 80 MG/1
80 TABLET, FILM COATED ORAL DAILY
Qty: 90 TABLET | Refills: 3 | Status: SHIPPED | OUTPATIENT
Start: 2023-10-26 | End: 2023-11-03

## 2023-10-26 RX ORDER — ATROPINE SULFATE 0.1 MG/ML
0.5 INJECTION INTRAVENOUS
Status: DISCONTINUED | OUTPATIENT
Start: 2023-10-26 | End: 2023-10-26

## 2023-10-26 RX ORDER — OXYCODONE HYDROCHLORIDE 5 MG/1
5 TABLET ORAL EVERY 4 HOURS PRN
Status: DISCONTINUED | OUTPATIENT
Start: 2023-10-26 | End: 2023-10-26

## 2023-10-26 RX ORDER — ASPIRIN 81 MG/1
81 TABLET, CHEWABLE ORAL ONCE
Status: DISCONTINUED | OUTPATIENT
Start: 2023-10-26 | End: 2023-10-26

## 2023-10-26 RX ORDER — FLUMAZENIL 0.1 MG/ML
0.2 INJECTION, SOLUTION INTRAVENOUS
Status: DISCONTINUED | OUTPATIENT
Start: 2023-10-26 | End: 2023-10-26

## 2023-10-26 RX ORDER — METOPROLOL TARTRATE 1 MG/ML
5 INJECTION, SOLUTION INTRAVENOUS
Status: DISCONTINUED | OUTPATIENT
Start: 2023-10-26 | End: 2023-10-26

## 2023-10-26 RX ORDER — OXYCODONE HYDROCHLORIDE 10 MG/1
10 TABLET ORAL EVERY 4 HOURS PRN
Status: DISCONTINUED | OUTPATIENT
Start: 2023-10-26 | End: 2023-11-03 | Stop reason: HOSPADM

## 2023-10-26 RX ORDER — MIDODRINE HYDROCHLORIDE 5 MG/1
10 TABLET ORAL
Status: DISCONTINUED | OUTPATIENT
Start: 2023-10-26 | End: 2023-10-26

## 2023-10-26 RX ORDER — MIDODRINE HYDROCHLORIDE 5 MG/1
10 TABLET ORAL
Status: DISCONTINUED | OUTPATIENT
Start: 2023-10-26 | End: 2023-10-28

## 2023-10-26 RX ORDER — ACETAMINOPHEN 325 MG/1
650 TABLET ORAL EVERY 4 HOURS PRN
Status: DISCONTINUED | OUTPATIENT
Start: 2023-10-26 | End: 2023-11-03 | Stop reason: HOSPADM

## 2023-10-26 RX ORDER — ONDANSETRON 4 MG/1
4 TABLET, ORALLY DISINTEGRATING ORAL EVERY 6 HOURS PRN
Status: DISCONTINUED | OUTPATIENT
Start: 2023-10-26 | End: 2023-11-03 | Stop reason: HOSPADM

## 2023-10-26 RX ORDER — ONDANSETRON 2 MG/ML
4 INJECTION INTRAMUSCULAR; INTRAVENOUS EVERY 6 HOURS PRN
Status: DISCONTINUED | OUTPATIENT
Start: 2023-10-26 | End: 2023-10-26

## 2023-10-26 RX ORDER — FENTANYL CITRATE 50 UG/ML
25 INJECTION, SOLUTION INTRAMUSCULAR; INTRAVENOUS
Status: DISCONTINUED | OUTPATIENT
Start: 2023-10-26 | End: 2023-10-27

## 2023-10-26 RX ORDER — HYDRALAZINE HYDROCHLORIDE 20 MG/ML
10 INJECTION INTRAMUSCULAR; INTRAVENOUS EVERY 4 HOURS PRN
Status: DISCONTINUED | OUTPATIENT
Start: 2023-10-26 | End: 2023-10-27

## 2023-10-26 RX ORDER — ACETAMINOPHEN 650 MG/1
650 SUPPOSITORY RECTAL EVERY 4 HOURS PRN
Status: DISCONTINUED | OUTPATIENT
Start: 2023-10-26 | End: 2023-11-03 | Stop reason: HOSPADM

## 2023-10-26 RX ORDER — FLUMAZENIL 0.1 MG/ML
0.2 INJECTION, SOLUTION INTRAVENOUS
Status: ACTIVE | OUTPATIENT
Start: 2023-10-26 | End: 2023-10-26

## 2023-10-26 RX ORDER — NITROGLYCERIN 0.4 MG/1
0.4 TABLET SUBLINGUAL EVERY 5 MIN PRN
Status: DISCONTINUED | OUTPATIENT
Start: 2023-10-26 | End: 2023-10-26

## 2023-10-26 RX ORDER — POTASSIUM CHLORIDE 29.8 MG/ML
INJECTION INTRAVENOUS CONTINUOUS PRN
Status: COMPLETED | OUTPATIENT
Start: 2023-10-26 | End: 2023-10-26

## 2023-10-26 RX ORDER — NITROGLYCERIN 0.4 MG/1
0.4 TABLET SUBLINGUAL EVERY 5 MIN PRN
Status: DISCONTINUED | OUTPATIENT
Start: 2023-10-26 | End: 2023-11-03 | Stop reason: HOSPADM

## 2023-10-26 RX ORDER — ASPIRIN 81 MG/1
81 TABLET ORAL DAILY
Status: DISCONTINUED | OUTPATIENT
Start: 2023-10-27 | End: 2023-10-27

## 2023-10-26 RX ORDER — METHOCARBAMOL 750 MG/1
750 TABLET, FILM COATED ORAL 3 TIMES DAILY PRN
Status: COMPLETED | OUTPATIENT
Start: 2023-10-26 | End: 2023-10-28

## 2023-10-26 RX ORDER — ATROPINE SULFATE 0.1 MG/ML
0.5 INJECTION INTRAVENOUS
Status: ACTIVE | OUTPATIENT
Start: 2023-10-26 | End: 2023-10-26

## 2023-10-26 RX ORDER — OXYCODONE HYDROCHLORIDE 10 MG/1
10 TABLET ORAL EVERY 4 HOURS PRN
Status: DISCONTINUED | OUTPATIENT
Start: 2023-10-26 | End: 2023-10-26

## 2023-10-26 RX ORDER — OXYCODONE HYDROCHLORIDE 5 MG/1
5 TABLET ORAL EVERY 4 HOURS PRN
Status: DISCONTINUED | OUTPATIENT
Start: 2023-10-26 | End: 2023-11-03 | Stop reason: HOSPADM

## 2023-10-26 RX ORDER — ASPIRIN 81 MG/1
81 TABLET, CHEWABLE ORAL DAILY
Qty: 30 TABLET | Refills: 3 | Status: SHIPPED | OUTPATIENT
Start: 2023-10-26 | End: 2023-11-03

## 2023-10-26 RX ORDER — ASPIRIN 81 MG/1
81 TABLET ORAL DAILY
Status: DISCONTINUED | OUTPATIENT
Start: 2023-10-26 | End: 2023-10-26

## 2023-10-26 RX ORDER — FENTANYL CITRATE 50 UG/ML
25 INJECTION, SOLUTION INTRAMUSCULAR; INTRAVENOUS
Status: DISCONTINUED | OUTPATIENT
Start: 2023-10-26 | End: 2023-10-26

## 2023-10-26 RX ORDER — IOPAMIDOL 755 MG/ML
INJECTION, SOLUTION INTRAVASCULAR
Status: DISCONTINUED | OUTPATIENT
Start: 2023-10-26 | End: 2023-10-26 | Stop reason: HOSPADM

## 2023-10-26 RX ORDER — ATORVASTATIN CALCIUM 20 MG/1
20 TABLET, FILM COATED ORAL DAILY
Status: DISCONTINUED | OUTPATIENT
Start: 2023-10-26 | End: 2023-10-26

## 2023-10-26 RX ORDER — HEPARIN SODIUM 1000 [USP'U]/ML
INJECTION, SOLUTION INTRAVENOUS; SUBCUTANEOUS
Status: DISCONTINUED | OUTPATIENT
Start: 2023-10-26 | End: 2023-10-26 | Stop reason: HOSPADM

## 2023-10-26 RX ORDER — CHLORAL HYDRATE 500 MG
1 CAPSULE ORAL 2 TIMES DAILY
Status: ON HOLD | COMMUNITY
End: 2024-09-06

## 2023-10-26 RX ORDER — ONDANSETRON 4 MG/1
4 TABLET, ORALLY DISINTEGRATING ORAL EVERY 6 HOURS PRN
Status: DISCONTINUED | OUTPATIENT
Start: 2023-10-26 | End: 2023-10-26

## 2023-10-26 RX ORDER — AMLODIPINE BESYLATE 5 MG/1
5 TABLET ORAL DAILY
Status: DISCONTINUED | OUTPATIENT
Start: 2023-10-26 | End: 2023-10-28

## 2023-10-26 RX ADMIN — ACETAMINOPHEN 650 MG: 325 TABLET, FILM COATED ORAL at 18:42

## 2023-10-26 RX ADMIN — TICAGRELOR 90 MG: 90 TABLET ORAL at 20:10

## 2023-10-26 RX ADMIN — POTASSIUM CHLORIDE 40 MEQ: 750 TABLET, EXTENDED RELEASE ORAL at 05:10

## 2023-10-26 RX ADMIN — MIDODRINE HYDROCHLORIDE 10 MG: 5 TABLET ORAL at 18:42

## 2023-10-26 RX ADMIN — HEPARIN SODIUM 4500 UNITS: 1000 INJECTION INTRAVENOUS; SUBCUTANEOUS at 01:05

## 2023-10-26 RX ADMIN — SODIUM CHLORIDE: 9 INJECTION, SOLUTION INTRAVENOUS at 03:15

## 2023-10-26 RX ADMIN — SODIUM CHLORIDE 1 MG/MIN: 0.9 INJECTION, SOLUTION INTRAVENOUS at 04:36

## 2023-10-26 RX ADMIN — MIDODRINE HYDROCHLORIDE 10 MG: 5 TABLET ORAL at 16:01

## 2023-10-26 RX ADMIN — ATORVASTATIN CALCIUM 80 MG: 80 TABLET, FILM COATED ORAL at 08:08

## 2023-10-26 RX ADMIN — POTASSIUM & SODIUM PHOSPHATES POWDER PACK 280-160-250 MG 1 PACKET: 280-160-250 PACK at 08:08

## 2023-10-26 RX ADMIN — MAGNESIUM OXIDE TAB 400 MG (241.3 MG ELEMENTAL MG) 400 MG: 400 (241.3 MG) TAB at 05:10

## 2023-10-26 RX ADMIN — HEPARIN SODIUM 5000 UNITS: 5000 INJECTION, SOLUTION INTRAVENOUS; SUBCUTANEOUS at 11:55

## 2023-10-26 RX ADMIN — METHOCARBAMOL 750 MG: 750 TABLET, FILM COATED ORAL at 23:00

## 2023-10-26 RX ADMIN — MAGNESIUM OXIDE TAB 400 MG (241.3 MG ELEMENTAL MG) 400 MG: 400 (241.3 MG) TAB at 23:00

## 2023-10-26 RX ADMIN — POTASSIUM & SODIUM PHOSPHATES POWDER PACK 280-160-250 MG 1 PACKET: 280-160-250 PACK at 05:10

## 2023-10-26 RX ADMIN — NITROGLYCERIN 0.4 MG: 0.4 TABLET SUBLINGUAL at 18:31

## 2023-10-26 RX ADMIN — MAGNESIUM OXIDE TAB 400 MG (241.3 MG ELEMENTAL MG) 400 MG: 400 (241.3 MG) TAB at 08:08

## 2023-10-26 RX ADMIN — HUMAN ALBUMIN MICROSPHERES AND PERFLUTREN 5 ML: 10; .22 INJECTION, SOLUTION INTRAVENOUS at 07:44

## 2023-10-26 RX ADMIN — HEPARIN SODIUM 5000 UNITS: 5000 INJECTION, SOLUTION INTRAVENOUS; SUBCUTANEOUS at 23:00

## 2023-10-26 RX ADMIN — AMIODARONE HYDROCHLORIDE 150 MG: 1.5 INJECTION, SOLUTION INTRAVENOUS at 04:35

## 2023-10-26 RX ADMIN — TICAGRELOR 180 MG: 90 TABLET ORAL at 01:02

## 2023-10-26 ASSESSMENT — ACTIVITIES OF DAILY LIVING (ADL)
ADLS_ACUITY_SCORE: 33
ADLS_ACUITY_SCORE: 24
ADLS_ACUITY_SCORE: 33
ADLS_ACUITY_SCORE: 35
PREVIOUS_RESPONSIBILITIES: MEDICATION MANAGEMENT;FINANCES;DRIVING
ADLS_ACUITY_SCORE: 33
ADLS_ACUITY_SCORE: 33
ADLS_ACUITY_SCORE: 35
ADLS_ACUITY_SCORE: 33
ADLS_ACUITY_SCORE: 33

## 2023-10-26 NOTE — Clinical Note
Called to Carmenza MURGUIA on 4A CIVCU. All questions answered. All belongings sent with patient. Patient transported to 4A CVICU via stretcher with SALEEM RN on all monitoring equipment.

## 2023-10-26 NOTE — PLAN OF CARE
Major Shift Events: Pt received from cath lab @0300. Neuro intact. A+Ox4. TORO. PERRL. Denies pain. Afebrile. SR/VT runs (with different morphologies) w/ PACs, 1st degree block (80s), prolonged QTc. Amio bolus given and gtt started. Met MAP>65 and SBP<150 goals with titrations to levo. TR band deflated. CMS intact in RUE. 4L NC. LS clear. Last BM PTA. Lasix given in cath lab. Currently +.4L, BS for 411mL at 0600. Electrolytes: K 3.7 (40mEq R). Mag 1.8 (R). Phos 2.6 (R). Trops 2436. CICU fellow notified about critical values and arrhythmias.     Plan: Wean levo and manage arrhythmias. Continue to follow POC.    For vital signs and complete assessments, please see documentation flowsheets.     Goal Outcome Evaluation:      Plan of Care Reviewed With: patient    Overall Patient Progress: improvingOverall Patient Progress: improving

## 2023-10-26 NOTE — Clinical Note
The first balloon was inserted into the left anterior descending.Max pressure = 12 chyna.     Max pressure = 12 chyna.    Balloon reinflated a second time: Max pressure = 12 chyna. Balloon reinflated a third time: Max pressure = 12 chyna.

## 2023-10-26 NOTE — ED PROVIDER NOTES
[place] EMERGENCY DEPARTMENT ([facility])    10/26/23       ED PROVIDER NOTE    History     Chief Complaint   Patient presents with    Chest Pain     Left sided cp and left arm pain , EKG shows STEMI.      HPI  Duane C Johnson is a 73 year old male with past medical history significant for prostate cancer, tobacco use, HTN, and HLD presenting to the emergency department for evaluation of chest pain. Patient describes chest pain that radiates into his left arm. The chest pain began at around 11 PM. The patient does not report sweats or shortness of breath. Patient denies any lower extremity pain or edema. Patient does not have history of cardiac problems. EMS gave the patient 324 mg of aspirin and fentanyl. No recent fevers or illnesses. Patient rates the pain at a 3/10.     Past Medical History  Past Medical History:   Diagnosis Date    Hypertension      Past Surgical History:   Procedure Laterality Date    COLONOSCOPY N/A 3/23/2023    Procedure: COLONOSCOPY, FLEXIBLE, WITH LESION REMOVAL USING SNARE;  Surgeon: Jose Faustin MD;  Location: [facility]     amLODIPine (NORVASC) 5 MG tablet  aspirin (ASA) 81 MG EC tablet  atorvastatin (LIPITOR) 20 MG tablet  Multiple Vitamins-Minerals (MULTIVITAMIN ADULT EXTRA C) CHEW  vitamin C (ASCORBIC ACID) 1000 MG TABS      No Known Allergies  Family History  Family History   Problem Relation Age of Onset    Other Cancer Mother     Obesity Mother     Cervical Cancer Sister     GI problems Father     Other Cancer Sister      Social History   Social History     Tobacco Use    Smoking status: Every Day     Packs/day: .25     Types: Cigarettes     Passive exposure: Current    Smokeless tobacco: Never   Vaping Use    Vaping Use: Never used   Substance Use Topics    Alcohol use: Yes     Comment: occas    Drug use: No      Past medical history, past surgical history, medications, allergies, family history, and social history were reviewed with the patient. No additional  "pertinent items.      A medically appropriate review of systems was performed with pertinent positives and negatives noted in the HPI, and all other systems negative.    Physical Exam     BP: 97/70  Pulse: 72  Temp: 97.7  F (36.5  C)  Resp: 16  Height: 167.6 cm (5' 6\")  Weight: 63.5 kg (140 lb)  SpO2: 100 %    Physical Exam  Physical Exam   Constitutional: oriented to person, place, and time. appears well-developed and well-nourished.   HENT:   Head: Normocephalic and atraumatic.   Neck: Normal range of motion.   Pulmonary/Chest: Effort normal. No respiratory distress.  Clear lungs bilaterally.  Cardiac: No murmurs, rubs, gallops. RRR.  Radial pulses 2+ and symmetric.  Abdominal: Abdomen soft, nontender, nondistended. No rebound tenderness.  MSK: Long bones without deformity or evidence of trauma  Neurological: alert and oriented to person, place, and time.   Skin: Skin is warm and dry.   Psychiatric:  normal mood and affect.  behavior is normal. Thought content normal.       ED Course, Procedures, & Data        Procedures            EKG Interpretation:      Interpreted by Jamal Maguire MD  Time reviewed: 0100  Symptoms at time of EKG: STEMI   Rhythm: normal sinus   Rate: Normal  Axis: Normal  Ectopy: premature ventricular contractions (unifocal)  Conduction: right bundle branch block (complete)  ST Segments/ T Waves: ST elevation in V1, V2, 1 and aVL  Q Waves: atero-lateral leads  Comparison to prior: STEMI now present    Clinical Impression: Anterior lateral STEMI present                   Results for orders placed or performed during the hospital encounter of 10/26/23   CBC with platelets differential     Status: None ()    Narrative    The following orders were created for panel order CBC with platelets differential.  Procedure                               Abnormality         Status                     ---------                               -----------         ------                     CBC with platelets " and d...[[id number]]                                                   Please view results for these tests on the individual orders.     Medications   ticagrelor (BRILINTA) tablet 180 mg (has no administration in time range)   heparin (porcine) injection 1000 units/mL (rounds in 500 unit increments) (has no administration in time range)     Labs Ordered and Resulted from Time of ED Arrival to Time of ED Departure - No data to display  XR Chest Port 1 View    (Results Pending)          Critical care was performed.   Critical Care Addendum  My initial assessment, based on my review of prehospital provider report, focused history, physical exam, and 12 lead ECG analysis, established a high suspicion that Duane C Johnson has  STEMI , which requires immediate intervention, and therefore he is critically ill.     After the initial assessment, the care team initiated medication therapy with ticagrelor, heparin  to provide stabilization care. Due to the critical nature of this patient, I reassessed vital signs and physical exam multiple times prior to his disposition.     Time also spent performing documentation, reviewing test results, and discussion with consultants.     Critical care time (excluding teaching time and procedures): 30 minutes.     Assessment & Plan      MDM  Patient presenting with chest pain and left arm pain.  Prehospital EKG has anterior lateral STEMI, prior EKG normal sinus.  EKG here also consistent with anterolateral STEMI.  Patient is otherwise stable.  Unlikely PE, dissection or other cause for chest pain.  STEMI activated on arrival.  Apparently they tried to activate the Cath Lab prior to arrival but unfortunately this was not done potentially delaying care.  Cardiology present shortly after arrival.  Patient transferred to the catheter lab with cardiology    I have reviewed the nursing notes. I have reviewed the findings, diagnosis, plan and need for follow up with the patient.    New  Prescriptions    No medications on file       Final diagnoses:   ST elevation myocardial infarction (STEMI), unspecified artery (H)     I, Lynne Koo, am serving as a trained medical scribe to document services personally performed by Jamal Maguire MD based on the provider's statements to me on October 26, 2023.  This document has been checked and approved by the attending provider.    IJamal MD, was physically present and have reviewed and verified the accuracy of this note documented by Lynne Koo, medical scribe.      Jamal Maguire MD  [facility] EMERGENCY DEPARTMENT  10/26/2023     Jamal Maguire MD  10/26/23 0115

## 2023-10-26 NOTE — Clinical Note
dry, intact, no bleeding and no hematoma. 4fr RFA sheath removed, manual pressure applied , hemostasis achieved, bandage placed.

## 2023-10-26 NOTE — PROGRESS NOTES
CLINICAL NUTRITION SERVICES - BRIEF NOTE    Received provider consult for nutrition education with comments Dietitian to see for Heart Healthy Diet education (automatic consult). Nutrition education to be completed as able/appropriate.    RD will follow per LOS protocol or if re-consulted.     Holly Davidson MS, RD, LD  4A (CVICU) RD pager: [phone]  Ascom: [id number]  Weekend/Holiday RD pager: [phone]

## 2023-10-26 NOTE — PROGRESS NOTES
Admitted/transferred from: Cath Lab  Reason for admission/transfer: Hypotension s/p PCI  2 RN skin assessment: completed by Fabiana HARPER and Carmenza WARD RN  Result of skin assessment and interventions/actions: R rad intervention site with TR band. No other overt concerns  Height, weight, drug calc weight: Done  Patient belongings (see Flowsheet)  MDRO education added to care planN/A  Carmenza WARD RN  ?

## 2023-10-26 NOTE — Clinical Note
Family (Melissa, wife) has been updated by MD fellow, Dr. Davis. All questions and concerns were addressed.

## 2023-10-26 NOTE — LETTER
Recipient:  Stacy Palomo  ATTN: Aranza        Sender:  Carmen Webb [facility]  Ph: [phone]      Date: November 3, 2023  Patient Name:  Duane C Johnson  Patient YOB: 1950  Routing Message:    Please see discharge orders for the above patient attached. Please call or Teams with any concerns or questions.       The documents accompanying this notice contain confidential information belonging to the sender.  This information is intended only for the use of the individual or entity named above.  The authorized recipient of this information is prohibited from disclosing this information to any other party and is required to destroy the information after its stated need has been fulfilled, unless otherwise required by state law.    If you are not the intended recipient, you are hereby notified that any disclosure, copy, distribution or action taken in reliance on the contents of these documents is strictly prohibited.  If you have received this document in error, please return it by fax to [phone] with a note on the cover sheet explaining why you are returning it (e.g. not your patient, not your provider, etc.).  If you need further assistance, please call .  Documents may also be returned by mail to [employer], , [facility] [address] [place], [place] [address].

## 2023-10-26 NOTE — Clinical Note
The first balloon was inserted into the left anterior descending.Max pressure = 6 chyna.     Max pressure = 6 chyna.    Balloon reinflated a second time: Max pressure = 6 chyna.[id number]

## 2023-10-26 NOTE — PHARMACY-ADMISSION MEDICATION HISTORY
Pharmacist Admission Medication History    Admission medication history is complete. The information provided in this note is only as accurate as the sources available at the time of the update.    Information Source(s): Patient via in-person    Changes made to PTA medication list:  Added: fish oil (also contains another supplement he cannot recall), cetirizine  Deleted: None  Changed: Multivitamin from chew to tablet    Allergies reviewed with patient and updates made in EHR: yes    Medication History Completed By: Fariba Lazaro [facility] 10/26/2023 11:15 AM    PTA Med List   Medication Sig Last Dose    amLODIPine (NORVASC) 5 MG tablet Take 1 tablet (5 mg) by mouth daily 10/25/2023    aspirin (ASA) 81 MG chewable tablet Take 1 tablet (81 mg) by mouth daily Starting tomorrow.     aspirin (ASA) 81 MG EC tablet Take 1 tablet (81 mg) by mouth daily 10/25/2023    atorvastatin (LIPITOR) 20 MG tablet Take 1 tablet (20 mg) by mouth daily 10/25/2023    atorvastatin (LIPITOR) 80 MG tablet Take 1 tablet (80 mg) by mouth daily     cetirizine (ZYRTEC) 10 MG tablet Take 10 mg by mouth daily 10/25/2023    fish oil-omega-3 fatty acids 1000 MG capsule Take 1 g by mouth 2 times daily Also contains another supplement 10/25/2023    multivitamin w/minerals (THERA-VIT-M) tablet Take 1 tablet by mouth daily 10/25/2023    [START ON 10/27/2023] ticagrelor (BRILINTA) 90 MG tablet Take 1 tablet (90 mg) by mouth 2 times daily Dose to start tomorrow morning.     vitamin C (ASCORBIC ACID) 1000 MG TABS Take 1,000 mg by mouth 10/25/2023

## 2023-10-26 NOTE — Clinical Note
The first balloon was inserted into the left anterior descending and left anterior descending diagonal.Max pressure = 6 chyna.     Max pressure = 6 chyna.    Balloon reinflated a second time: Max pressure = 6 chyna. Balloon reinflated a third time: Max pressure = 12 chyna.

## 2023-10-26 NOTE — ED TRIAGE NOTES
Triage Assessment (Adult)       Row Name 10/26/23 0053          Triage Assessment    Airway WDL WDL        Respiratory WDL    Respiratory WDL WDL        Skin Circulation/Temperature WDL    Skin Circulation/Temperature WDL WDL        Cardiac WDL    Cardiac WDL WDL        Peripheral/Neurovascular WDL    Peripheral Neurovascular WDL WDL        Cognitive/Neuro/Behavioral WDL    Cognitive/Neuro/Behavioral WDL WDL

## 2023-10-26 NOTE — PROGRESS NOTES
Cardiology ICU Progress Note    Brief HPI:  Mr Duane C Johnson is a 73 year old gentleman with a history of HTN, HLD, and Tobacco Use who is admitted with acute chest pain and EKG demonstrating anterior STEMI. He is s/p PCI of proximal LAD, requiring pressors for hemodynamic support but improving.     Subjective and Interval:  -Remains chest pain free  -Still with frequent runs of non-sustained VT, still on amio drip  -TTE done, EF 25-30%, anterior and anteroseptal akinesis  -Still on levo  -Will need AC for 3-6 months given anterior wall akinesis predisposing to LV thrombus    Assessment and plan by system:    ===Neuro===  #Pain/Agitation/Sedation  Acetaminophen and Oxycodone PRN     ===Cardiovascular===  #Anterior STEMI s/p PCI  #Cardiogenic Shock  #Coronary Artery Disease  #HTN  #HLD  Onset of chest pain at rest 10/25 at approximately 2330, called 911 and EMS EKG demonstrated STEMI. Cath lab activated and angiography demonstrated diffuse coronary artery disease with occlusion of the proximal LAD. S/p PCI to the LAD. Required moderate dose vasopressor during the procedure concerning for cardiogenic shock in the setting of acute MI. LVEDP measured in cath lab notably elevated to 30 mmHg.   -TTE done, EF 25-30%, anterior and anteroseptal akinesis  - Continue DAPT ASA and Ticagrelor, plan for 12 months of therapy   - Continue home Atorvastatin  - Defer beta blocker in setting of hypotension requiring vasopressor   - Holding off GDMT while on pressors  - Cardiac Rehab referral  - Telemetry monitoring   - Wean vasopressors as able  - S/p furosemide 40mg IV, trend UOP     ===Pulmonary===  No current concerns         ===Renal===  -JIE        ===Gastroenterology===  #Nutrition  Heart Healthy Diet        ===Infectious Disease===  #Leukocytosis  Likely reactive in s/o STEMI. Trend and monitor for infectious symptoms.      ===Hematology===  #Leukocytosis  As above.      FEN: HHD  Analgesia: APAP and Oxy PRN  Sedation:  "None  Anticoagulants: None  Ulcer PPX: None  Glucose: Consider sliding scale insulin   SBT/SAT: NA  Bowel Regimen: PRN  Tubes/Lines/Drains:  - RIJ CVC  - RR arterial   - PIV    Patient seen and discussed with staff physician.    Objective:  Most recent vital signs:  BP 94/58 (BP Location: Left arm)   Pulse 67   Temp 98.5  F (36.9  C) (Oral)   Resp 19   Ht 1.676 m (5' 5.98\")   Wt 65.9 kg (145 lb 4.5 oz)   SpO2 100%   BMI 23.46 kg/m    Temp:  [97.6  F (36.4  C)-98.5  F (36.9  C)] 98.5  F (36.9  C)  Pulse:  [65-98] 67  Resp:  [13-25] 19  BP: ()/(54-96) 94/58  SpO2:  [92 %-100 %] 100 %  Wt Readings from Last 2 Encounters:   10/26/23 65.9 kg (145 lb 4.5 oz)   10/09/23 67.1 kg (148 lb)       Intake/Output Summary (Last 24 hours) at 10/26/2023 0952  Last data filed at 10/26/2023 0900  Gross per 24 hour   Intake 798.59 ml   Output --   Net 798.59 ml     Physical exam:  General: In bed, in NAD  HEENT: PERRL, no scleral icterus or injection  CARDIAC: RRR, no m/r/g appreciated. Peripheral pulses dopplered  RESP: Mechanical ventilation; CTAB, no wheezes, rhonchi or crackles appreciated.  GI: soft, BS hypoactive  : Michael  EXTREMITIES: NO LE edema, pulses 2+. Femoral access site w/o bleeding, dressing c/d/i.   SKIN: No acute lesions appreciated  NEURO: Intubated and sedated    Labs (Past three days):  CBC  Recent Labs   Lab 10/26/23  0336 10/26/23  0059   WBC 15.1* 12.8*   RBC 3.87* 4.42   HGB 12.5* 14.4   HCT 35.8* 41.1   MCV 93 93   MCH 32.3 32.6   MCHC 34.9 35.0   RDW 12.6 12.7    348     BMP  Recent Labs   Lab 10/26/23  0950 10/26/23  0341 10/26/23  0336 10/26/23  0300 10/26/23  0101 10/26/23  0059     --  135  --   --  136   POTASSIUM 4.4  --  3.7  --   --  3.0*   CHLORIDE 103  --  101  --   --  97*   CO2 23  --  24  --   --  28   ANIONGAP 9  --  10  --   --  11   * 161* 158* 125*  --  136*   BUN 7.3*  --  6.8*  --   --  6.7*   CR 0.84  --  0.79  --  0.8 0.86   GFRESTIMATED >90  --  >90  -- "  >60 >90   PRANAV 8.4*  --  8.5*  --   --  9.5   MAG  --   --  1.8  --   --   --    PHOS  --   --  2.6  --   --   --      Troponins:     INR  Recent Labs   Lab 10/26/23  0059   INR 1.10     Liver panel  Recent Labs   Lab 10/26/23  0950 10/26/23  0336   PROTTOTAL 6.3* 6.6   ALBUMIN 3.5 3.5   BILITOTAL 0.3 0.5   ALKPHOS 89 91   * 211*   ALT 59 29       Imaging/procedure results:  Echocardiogram Complete  [id number]  [id number]  [id number]  [id number]^HEMANTH^RITESH     [facility],[place]  Echocardiography Laboratory  [address] [place] [id number]     Name: JOHNSON, DUANE C  MRN: [id number]  : 1950  Study Date: 10/26/2023 07:24 AM  Age: 73 yrs  Gender: Male  Patient [facility]: [facility]  Reason For Study: CAD, Myocardial Infarction  Ordering Physician: RITESH SAXENA  Performed By: Josefina Wakefield RDCS     BSA: 1.7 m2  Height: 66 in  Weight: 140 lb  BP: 92/68 mmHg  ______________________________________________________________________________  Procedure  Echocardiogram with two-dimensional, color and spectral Doppler performed.  Contrast Optison. Optison (NDC #3218-9463-11) given intravenously. Patient was  given 5 ml mixture of 3 ml Optison and 6 ml saline. 4 ml wasted.  ______________________________________________________________________________  Interpretation Summary  Left ventricular function is decreased. Biplane LVEF is 29%. Severe  hypokinesis of all segments except basal inferior and inferolateral. Anterior  and anteroseptal segments are akinetic. No LV thrombus noted.  Global right ventricular function is borderline reduced.  No significant valve abnormalities.  IVC diameter and respiratory changes fall into an intermediate range  suggesting an RA pressure of 8 mmHg.  There is no prior study for direct comparison.  ______________________________________________________________________________  Left Ventricle  Left ventricular wall thickness is normal. Left ventricular size  is normal.  Biplane LVEF is 29%. Left ventricular function is decreased. The ejection  fraction is 25-30% (severely reduced). Diastolic function not assessed due to  tachycardia. Severe hypokinesis of all segments except basal inferior and  inferolateral. Anterior and anteroseptal segments are akinetic. No LV thrombus  noted.     Right Ventricle  The right ventricle is normal size. Global right ventricular function is  borderline reduced.     Atria  Both atria appear normal.     Mitral Valve  The mitral valve is normal. Mild mitral insufficiency is present.     Aortic Valve  Trileaflet aortic sclerosis without stenosis.     Tricuspid Valve  The tricuspid valve is normal. Mild tricuspid insufficiency is present.  Estimated pulmonary artery systolic pressure is 22 mmHg plus right atrial  pressure.     Pulmonic Valve  The pulmonic valve is normal. Trace pulmonic insufficiency is present.     Vessels  Normal diameter aortic root and proximal ascending aorta. Sinuses of Valsalva  3.5 cm. Ascending aorta 3.5 cm. IVC diameter and respiratory changes fall into  an intermediate range suggesting an RA pressure of 8 mmHg.     Pericardium  No pericardial effusion is present.     Compared to Previous Study  There is no prior study for direct comparison.     Attestation  I have personally viewed the imaging and agree with the interpretation and  report as documented by the fellow, Stanislaw Brannon, and/or edited by me.  ______________________________________________________________________________  MMode/2D Measurements & Calculations  IVSd: 0.97 cm  LVIDd: 5.2 cm  LVIDs: 3.8 cm  LVPWd: 0.90 cm  FS: 27.6 %  LV mass(C)d: 177.2 grams  LV mass(C)dI: 103.1 grams/m2  Ao root diam: 3.5 cm  asc Aorta Diam: 3.5 cm  LVOT diam: 2.0 cm  LVOT area: 3.2 cm2     EF(MOD-bp): 29.4 %  Ao root diam index Ht(cm/m): 2.1  Ao root diam index BSA (cm/m2): 2.0  Asc Ao diam index BSA (cm/m2): 2.1  Asc Ao diam index Ht(cm/m): 2.1     LA Volume (BP): 55.7  ml  LA Volume Index (BP): 32.4 ml/m2  RV Base: 3.7 cm  RWT: 0.35     Doppler Measurements & Calculations  MV E max ivan: 59.7 cm/sec  MV A max ivan: 48.2 cm/sec  MV E/A: 1.2  MV dec slope: 278.1 cm/sec2  MV dec time: 0.21 sec  LV V1 max P.1 mmHg  LV V1 max: 71.5 cm/sec  LV V1 VTI: 13.7 cm  SV(LVOT): 43.6 ml  SI(LVOT): 25.4 ml/m2     TR max ivan: 236.5 cm/sec  TR max P.4 mmHg  Medial E/e': 12.5  RV S Ivan: 9.9 cm/sec     ______________________________________________________________________________  Report approved by: Eusebio Posey 10/26/2023 10:06 AM        XR Chest Port 1 View  Narrative: Exam: XR CHEST PORT 1 VIEW, 10/26/2023 1:21 AM    Indication: stemi    Comparison: CT chest 3/15/2023    Findings:   Single portable AP 60 degrees upright view of the chest was obtained.  Defibrillator pads overlie the left hemithorax. Midline trachea.  Normal cardiomediastinal silhouette. Atherosclerotic calcifications of  the aortic arch. No appreciable pneumothorax. Clear costophrenic  angles. Mildly prominent and somewhat indistinct pulmonary  vasculature. No acute osseous abnormality.  Impression: Impression:   Mild pulmonary vascular congestion. No acute consolidation.    I have personally reviewed the examination and initial interpretation  and I agree with the findings.    KHLOE FRANCISCO DO         SYSTEM ID:  [id number]  Cardiac Catheterization    1st Mrg lesion is 20% stenosed.    Prox LAD to Mid LAD lesion is 100% stenosed.    1st Diag-1 lesion is 80% stenosed.    1st Diag-2 lesion is 50% stenosed.    Dist LAD lesion is 100% stenosed.    RPDA lesion is 70% stenosed.    Dist RCA lesion is 40% stenosed.    Anterior STEMI  Two vessel obstructive CAD (100% pLAD occlusion, 70% rPDA stenosis, 80%   diagonal 1 stenosis)  PCI of pLAD to mLAD with BRENDA x 1 (Synergy 2.50x 28 mm) post dilated to   2.75 vessel  CVC placement in RIJ  LVEDP of 26 mmHg  Hemostasis of RRA with TR band

## 2023-10-26 NOTE — Clinical Note
The first balloon was inserted into the left anterior descending and proximal left anterior descending.Max pressure = 12 chyna.     Max pressure = 12 chyna.    Balloon reinflated a second time: Max pressure = 12 chyna. Balloon reinflated a third time: Max pressure = 12 chyna. Balloon reinflated a fourth time: Max pressure = 12 chyna.

## 2023-10-27 ENCOUNTER — APPOINTMENT (OUTPATIENT)
Dept: GENERAL RADIOLOGY | Facility: CLINIC | Age: 73
DRG: 321 | End: 2023-10-27
Attending: NURSE PRACTITIONER
Payer: MEDICARE

## 2023-10-27 ENCOUNTER — APPOINTMENT (OUTPATIENT)
Dept: OCCUPATIONAL THERAPY | Facility: CLINIC | Age: 73
DRG: 321 | End: 2023-10-27
Payer: MEDICARE

## 2023-10-27 LAB
ALBUMIN SERPL BCG-MCNC: 3.3 G/DL (ref 3.5–5.2)
ALBUMIN SERPL BCG-MCNC: 3.5 G/DL (ref 3.5–5.2)
ALBUMIN SERPL BCG-MCNC: 3.5 G/DL (ref 3.5–5.2)
ALBUMIN SERPL BCG-MCNC: 3.6 G/DL (ref 3.5–5.2)
ALLEN'S TEST: NO
ALP SERPL-CCNC: 80 U/L (ref 40–129)
ALP SERPL-CCNC: 84 U/L (ref 40–129)
ALP SERPL-CCNC: 85 U/L (ref 40–129)
ALP SERPL-CCNC: 86 U/L (ref 40–129)
ALT SERPL W P-5'-P-CCNC: 66 U/L (ref 0–70)
ALT SERPL W P-5'-P-CCNC: 67 U/L (ref 0–70)
ALT SERPL W P-5'-P-CCNC: 68 U/L (ref 0–70)
ALT SERPL W P-5'-P-CCNC: 68 U/L (ref 0–70)
ANION GAP BLD CALC-SCNC: 8 MMOL/L (ref 5–18)
ANION GAP SERPL CALCULATED.3IONS-SCNC: 11 MMOL/L (ref 7–15)
ANION GAP SERPL CALCULATED.3IONS-SCNC: 11 MMOL/L (ref 7–15)
ANION GAP SERPL CALCULATED.3IONS-SCNC: 12 MMOL/L (ref 7–15)
ANION GAP SERPL CALCULATED.3IONS-SCNC: 9 MMOL/L (ref 7–15)
APTT PPP: 31 SECONDS (ref 22–38)
AST SERPL W P-5'-P-CCNC: 331 U/L (ref 0–45)
AST SERPL W P-5'-P-CCNC: 362 U/L (ref 0–45)
AST SERPL W P-5'-P-CCNC: 431 U/L (ref 0–45)
AST SERPL W P-5'-P-CCNC: 481 U/L (ref 0–45)
ATRIAL RATE - MUSE: 0 BPM
ATRIAL RATE - MUSE: 70 BPM
BASE EXCESS BLDA CALC-SCNC: 1.4 MMOL/L (ref -9–1.8)
BILIRUB SERPL-MCNC: 0.6 MG/DL
BILIRUB SERPL-MCNC: 0.6 MG/DL
BILIRUB SERPL-MCNC: 0.7 MG/DL
BILIRUB SERPL-MCNC: 0.7 MG/DL
BUN SERPL-MCNC: 15.8 MG/DL (ref 8–23)
BUN SERPL-MCNC: 16.8 MG/DL (ref 8–23)
BUN SERPL-MCNC: 18.3 MG/DL (ref 8–23)
BUN SERPL-MCNC: 18.8 MG/DL (ref 8–23)
CA-I BLD-MCNC: 4.7 MG/DL (ref 4.4–5.2)
CALCIUM SERPL-MCNC: 8.1 MG/DL (ref 8.8–10.2)
CALCIUM SERPL-MCNC: 8.6 MG/DL (ref 8.8–10.2)
CALCIUM SERPL-MCNC: 9 MG/DL (ref 8.8–10.2)
CALCIUM SERPL-MCNC: 9 MG/DL (ref 8.8–10.2)
CHLORIDE BLD-SCNC: 103 MMOL/L (ref 94–109)
CHLORIDE SERPL-SCNC: 100 MMOL/L (ref 98–107)
CHLORIDE SERPL-SCNC: 102 MMOL/L (ref 98–107)
CHLORIDE SERPL-SCNC: 103 MMOL/L (ref 98–107)
CHLORIDE SERPL-SCNC: 106 MMOL/L (ref 98–107)
CO2 SERPL-SCNC: 27 MMOL/L (ref 20–32)
CREAT SERPL-MCNC: 0.79 MG/DL (ref 0.67–1.17)
CREAT SERPL-MCNC: 0.9 MG/DL (ref 0.67–1.17)
CREAT SERPL-MCNC: 0.93 MG/DL (ref 0.67–1.17)
CREAT SERPL-MCNC: 1 MG/DL (ref 0.67–1.17)
DEPRECATED HCO3 PLAS-SCNC: 23 MMOL/L (ref 22–29)
DEPRECATED HCO3 PLAS-SCNC: 24 MMOL/L (ref 22–29)
DIASTOLIC BLOOD PRESSURE - MUSE: NORMAL MMHG
DIASTOLIC BLOOD PRESSURE - MUSE: NORMAL MMHG
EGFRCR SERPLBLD CKD-EPI 2021: 79 ML/MIN/1.73M2
EGFRCR SERPLBLD CKD-EPI 2021: 87 ML/MIN/1.73M2
EGFRCR SERPLBLD CKD-EPI 2021: 90 ML/MIN/1.73M2
EGFRCR SERPLBLD CKD-EPI 2021: >90 ML/MIN/1.73M2
ERYTHROCYTE [DISTWIDTH] IN BLOOD BY AUTOMATED COUNT: 13 % (ref 10–15)
ERYTHROCYTE [DISTWIDTH] IN BLOOD BY AUTOMATED COUNT: 13.1 % (ref 10–15)
GLUCOSE BLDC GLUCOMTR-MCNC: 113 MG/DL (ref 70–99)
GLUCOSE SERPL-MCNC: 100 MG/DL (ref 70–99)
GLUCOSE SERPL-MCNC: 109 MG/DL (ref 70–99)
GLUCOSE SERPL-MCNC: 120 MG/DL (ref 70–99)
GLUCOSE SERPL-MCNC: 121 MG/DL (ref 70–99)
HCO3 BLD-SCNC: 26 MMOL/L (ref 21–28)
HCT VFR BLD AUTO: 31.4 % (ref 40–53)
HCT VFR BLD AUTO: 33.6 % (ref 40–53)
HGB BLD-MCNC: 11 G/DL (ref 13.3–17.7)
HGB BLD-MCNC: 11.9 G/DL (ref 13.3–17.7)
HOLD SPECIMEN: NORMAL
INR PPP: 1.23 (ref 0.85–1.15)
INTERPRETATION ECG - MUSE: NORMAL
INTERPRETATION ECG - MUSE: NORMAL
LACTATE SERPL-SCNC: 0.8 MMOL/L (ref 0.7–2)
LACTATE SERPL-SCNC: 1.5 MMOL/L (ref 0.7–2)
LACTATE SERPL-SCNC: 2.1 MMOL/L (ref 0.7–2)
LACTATE SERPL-SCNC: 2.1 MMOL/L (ref 0.7–2)
LACTATE SERPL-SCNC: 2.6 MMOL/L (ref 0.7–2)
LACTATE SERPL-SCNC: 3 MMOL/L (ref 0.7–2)
MAGNESIUM SERPL-MCNC: 2.1 MG/DL (ref 1.7–2.3)
MAGNESIUM SERPL-MCNC: 2.3 MG/DL (ref 1.7–2.3)
MCH RBC QN AUTO: 32.7 PG (ref 26.5–33)
MCH RBC QN AUTO: 33 PG (ref 26.5–33)
MCHC RBC AUTO-ENTMCNC: 35 G/DL (ref 31.5–36.5)
MCHC RBC AUTO-ENTMCNC: 35.4 G/DL (ref 31.5–36.5)
MCV RBC AUTO: 93 FL (ref 78–100)
MCV RBC AUTO: 94 FL (ref 78–100)
O2/TOTAL GAS SETTING VFR VENT: 2 %
OXYHGB MFR BLD: 76 % (ref 92–100)
P AXIS - MUSE: 63 DEGREES
P AXIS - MUSE: NORMAL DEGREES
PCO2 BLD: 40 MM HG (ref 35–45)
PH BLD: 7.42 [PH] (ref 7.35–7.45)
PHOSPHATE SERPL-MCNC: 2.1 MG/DL (ref 2.5–4.5)
PHOSPHATE SERPL-MCNC: 3.1 MG/DL (ref 2.5–4.5)
PLATELET # BLD AUTO: 247 10E3/UL (ref 150–450)
PLATELET # BLD AUTO: 274 10E3/UL (ref 150–450)
PO2 BLD: 42 MM HG (ref 80–105)
POTASSIUM BLD-SCNC: 4.1 MMOL/L (ref 3.4–5.3)
POTASSIUM SERPL-SCNC: 4 MMOL/L (ref 3.4–5.3)
POTASSIUM SERPL-SCNC: 4.1 MMOL/L (ref 3.4–5.3)
POTASSIUM SERPL-SCNC: 4.2 MMOL/L (ref 3.4–5.3)
POTASSIUM SERPL-SCNC: 4.4 MMOL/L (ref 3.4–5.3)
PR INTERVAL - MUSE: 196 MS
PR INTERVAL - MUSE: NORMAL MS
PROT SERPL-MCNC: 6.2 G/DL (ref 6.4–8.3)
PROT SERPL-MCNC: 6.5 G/DL (ref 6.4–8.3)
PROT SERPL-MCNC: 6.6 G/DL (ref 6.4–8.3)
PROT SERPL-MCNC: 6.6 G/DL (ref 6.4–8.3)
QRS DURATION - MUSE: 140 MS
QRS DURATION - MUSE: 90 MS
QT - MUSE: 462 MS
QT - MUSE: 474 MS
QTC - MUSE: 511 MS
QTC - MUSE: 532 MS
R AXIS - MUSE: -20 DEGREES
R AXIS - MUSE: 113 DEGREES
RBC # BLD AUTO: 3.36 10E6/UL (ref 4.4–5.9)
RBC # BLD AUTO: 3.61 10E6/UL (ref 4.4–5.9)
SODIUM SERPL-SCNC: 136 MMOL/L (ref 135–145)
SODIUM SERPL-SCNC: 137 MMOL/L (ref 135–145)
SODIUM SERPL-SCNC: 138 MMOL/L (ref 135–145)
SYSTOLIC BLOOD PRESSURE - MUSE: NORMAL MMHG
SYSTOLIC BLOOD PRESSURE - MUSE: NORMAL MMHG
T AXIS - MUSE: 100 DEGREES
T AXIS - MUSE: 2 DEGREES
TROPONIN T SERPL HS-MCNC: 7354 NG/L
TROPONIN T SERPL HS-MCNC: 7637 NG/L
TROPONIN T SERPL HS-MCNC: 8255 NG/L
TROPONIN T SERPL HS-MCNC: 8624 NG/L
VENTRICULAR RATE- MUSE: 70 BPM
VENTRICULAR RATE- MUSE: 80 BPM
WBC # BLD AUTO: 12.2 10E3/UL (ref 4–11)
WBC # BLD AUTO: 14.4 10E3/UL (ref 4–11)

## 2023-10-27 PROCEDURE — 80051 ELECTROLYTE PANEL: CPT | Performed by: STUDENT IN AN ORGANIZED HEALTH CARE EDUCATION/TRAINING PROGRAM

## 2023-10-27 PROCEDURE — 250N000013 HC RX MED GY IP 250 OP 250 PS 637: Performed by: STUDENT IN AN ORGANIZED HEALTH CARE EDUCATION/TRAINING PROGRAM

## 2023-10-27 PROCEDURE — 93454 CORONARY ARTERY ANGIO S&I: CPT | Mod: 26 | Performed by: INTERNAL MEDICINE

## 2023-10-27 PROCEDURE — 99291 CRITICAL CARE FIRST HOUR: CPT | Mod: 25 | Performed by: INTERNAL MEDICINE

## 2023-10-27 PROCEDURE — 36415 COLL VENOUS BLD VENIPUNCTURE: CPT | Performed by: NURSE PRACTITIONER

## 2023-10-27 PROCEDURE — 71045 X-RAY EXAM CHEST 1 VIEW: CPT | Mod: 26 | Performed by: RADIOLOGY

## 2023-10-27 PROCEDURE — 999N000034 HC STATISTIC CODE BLUE ACCESS REQUIRED

## 2023-10-27 PROCEDURE — 272N000001 HC OR GENERAL SUPPLY STERILE: Performed by: INTERNAL MEDICINE

## 2023-10-27 PROCEDURE — 83605 ASSAY OF LACTIC ACID: CPT | Performed by: STUDENT IN AN ORGANIZED HEALTH CARE EDUCATION/TRAINING PROGRAM

## 2023-10-27 PROCEDURE — 84155 ASSAY OF PROTEIN SERUM: CPT | Performed by: STUDENT IN AN ORGANIZED HEALTH CARE EDUCATION/TRAINING PROGRAM

## 2023-10-27 PROCEDURE — 36591 DRAW BLOOD OFF VENOUS DEVICE: CPT

## 2023-10-27 PROCEDURE — 93010 ELECTROCARDIOGRAM REPORT: CPT | Performed by: INTERNAL MEDICINE

## 2023-10-27 PROCEDURE — 250N000011 HC RX IP 250 OP 636: Mod: JZ | Performed by: INTERNAL MEDICINE

## 2023-10-27 PROCEDURE — 250N000011 HC RX IP 250 OP 636: Mod: JZ | Performed by: NURSE PRACTITIONER

## 2023-10-27 PROCEDURE — 82330 ASSAY OF CALCIUM: CPT | Performed by: INTERNAL MEDICINE

## 2023-10-27 PROCEDURE — 80051 ELECTROLYTE PANEL: CPT | Performed by: INTERNAL MEDICINE

## 2023-10-27 PROCEDURE — 84100 ASSAY OF PHOSPHORUS: CPT | Performed by: INTERNAL MEDICINE

## 2023-10-27 PROCEDURE — 250N000009 HC RX 250: Performed by: INTERNAL MEDICINE

## 2023-10-27 PROCEDURE — 85610 PROTHROMBIN TIME: CPT | Performed by: INTERNAL MEDICINE

## 2023-10-27 PROCEDURE — 80053 COMPREHEN METABOLIC PANEL: CPT | Performed by: STUDENT IN AN ORGANIZED HEALTH CARE EDUCATION/TRAINING PROGRAM

## 2023-10-27 PROCEDURE — 85027 COMPLETE CBC AUTOMATED: CPT | Performed by: INTERNAL MEDICINE

## 2023-10-27 PROCEDURE — 97530 THERAPEUTIC ACTIVITIES: CPT | Mod: GO | Performed by: OCCUPATIONAL THERAPIST

## 2023-10-27 PROCEDURE — 93005 ELECTROCARDIOGRAM TRACING: CPT

## 2023-10-27 PROCEDURE — 83735 ASSAY OF MAGNESIUM: CPT | Performed by: INTERNAL MEDICINE

## 2023-10-27 PROCEDURE — C1894 INTRO/SHEATH, NON-LASER: HCPCS | Performed by: INTERNAL MEDICINE

## 2023-10-27 PROCEDURE — 999N000099 HC STATISTIC MODERATE SEDATION < 10 MIN: Performed by: INTERNAL MEDICINE

## 2023-10-27 PROCEDURE — 250N000011 HC RX IP 250 OP 636: Performed by: INTERNAL MEDICINE

## 2023-10-27 PROCEDURE — 83605 ASSAY OF LACTIC ACID: CPT | Performed by: INTERNAL MEDICINE

## 2023-10-27 PROCEDURE — 36415 COLL VENOUS BLD VENIPUNCTURE: CPT | Performed by: STUDENT IN AN ORGANIZED HEALTH CARE EDUCATION/TRAINING PROGRAM

## 2023-10-27 PROCEDURE — 82805 BLOOD GASES W/O2 SATURATION: CPT | Performed by: INTERNAL MEDICINE

## 2023-10-27 PROCEDURE — 5A12012 PERFORMANCE OF CARDIAC OUTPUT, SINGLE, MANUAL: ICD-10-PCS | Performed by: INTERNAL MEDICINE

## 2023-10-27 PROCEDURE — 84450 TRANSFERASE (AST) (SGOT): CPT | Performed by: STUDENT IN AN ORGANIZED HEALTH CARE EDUCATION/TRAINING PROGRAM

## 2023-10-27 PROCEDURE — 97535 SELF CARE MNGMENT TRAINING: CPT | Mod: GO | Performed by: OCCUPATIONAL THERAPIST

## 2023-10-27 PROCEDURE — 84484 ASSAY OF TROPONIN QUANT: CPT | Performed by: STUDENT IN AN ORGANIZED HEALTH CARE EDUCATION/TRAINING PROGRAM

## 2023-10-27 PROCEDURE — 250N000011 HC RX IP 250 OP 636

## 2023-10-27 PROCEDURE — 258N000003 HC RX IP 258 OP 636: Performed by: STUDENT IN AN ORGANIZED HEALTH CARE EDUCATION/TRAINING PROGRAM

## 2023-10-27 PROCEDURE — 250N000011 HC RX IP 250 OP 636: Performed by: STUDENT IN AN ORGANIZED HEALTH CARE EDUCATION/TRAINING PROGRAM

## 2023-10-27 PROCEDURE — B2111ZZ FLUOROSCOPY OF MULTIPLE CORONARY ARTERIES USING LOW OSMOLAR CONTRAST: ICD-10-PCS | Performed by: INTERNAL MEDICINE

## 2023-10-27 PROCEDURE — 96374 THER/PROPH/DIAG INJ IV PUSH: CPT | Performed by: EMERGENCY MEDICINE

## 2023-10-27 PROCEDURE — 99152 MOD SED SAME PHYS/QHP 5/>YRS: CPT | Performed by: INTERNAL MEDICINE

## 2023-10-27 PROCEDURE — 250N000013 HC RX MED GY IP 250 OP 250 PS 637: Performed by: NURSE PRACTITIONER

## 2023-10-27 PROCEDURE — 84484 ASSAY OF TROPONIN QUANT: CPT | Performed by: INTERNAL MEDICINE

## 2023-10-27 PROCEDURE — 85730 THROMBOPLASTIN TIME PARTIAL: CPT | Performed by: INTERNAL MEDICINE

## 2023-10-27 PROCEDURE — 99292 CRITICAL CARE ADDL 30 MIN: CPT | Mod: 25 | Performed by: INTERNAL MEDICINE

## 2023-10-27 PROCEDURE — 85027 COMPLETE CBC AUTOMATED: CPT | Performed by: STUDENT IN AN ORGANIZED HEALTH CARE EDUCATION/TRAINING PROGRAM

## 2023-10-27 PROCEDURE — 200N000002 HC R&B ICU UMMC

## 2023-10-27 PROCEDURE — 83605 ASSAY OF LACTIC ACID: CPT | Performed by: NURSE PRACTITIONER

## 2023-10-27 PROCEDURE — 999N000248 HC STATISTIC IV INSERT WITH US BY RN

## 2023-10-27 PROCEDURE — 93454 CORONARY ARTERY ANGIO S&I: CPT | Performed by: INTERNAL MEDICINE

## 2023-10-27 PROCEDURE — 71045 X-RAY EXAM CHEST 1 VIEW: CPT

## 2023-10-27 PROCEDURE — 250N000013 HC RX MED GY IP 250 OP 250 PS 637: Performed by: INTERNAL MEDICINE

## 2023-10-27 RX ORDER — LIDOCAINE HYDROCHLORIDE ANHYDROUS AND DEXTROSE MONOHYDRATE .8; 5 G/100ML; G/100ML
0.5 INJECTION, SOLUTION INTRAVENOUS CONTINUOUS
Status: DISCONTINUED | OUTPATIENT
Start: 2023-10-27 | End: 2023-10-29

## 2023-10-27 RX ORDER — FUROSEMIDE 10 MG/ML
20 INJECTION INTRAMUSCULAR; INTRAVENOUS ONCE
Status: COMPLETED | OUTPATIENT
Start: 2023-10-27 | End: 2023-10-27

## 2023-10-27 RX ORDER — IOPAMIDOL 755 MG/ML
INJECTION, SOLUTION INTRAVASCULAR
Status: DISCONTINUED | OUTPATIENT
Start: 2023-10-27 | End: 2023-10-27 | Stop reason: HOSPADM

## 2023-10-27 RX ORDER — ONDANSETRON 2 MG/ML
INJECTION INTRAMUSCULAR; INTRAVENOUS
Status: DISCONTINUED | OUTPATIENT
Start: 2023-10-27 | End: 2023-10-27 | Stop reason: HOSPADM

## 2023-10-27 RX ADMIN — LIDOCAINE HYDROCHLORIDE 0.5 MG/MIN: 8 INJECTION, SOLUTION INTRAVENOUS at 23:03

## 2023-10-27 RX ADMIN — ASPIRIN 81 MG CHEWABLE TABLET 81 MG: 81 TABLET CHEWABLE at 08:15

## 2023-10-27 RX ADMIN — POTASSIUM & SODIUM PHOSPHATES POWDER PACK 280-160-250 MG 1 PACKET: 280-160-250 PACK at 13:28

## 2023-10-27 RX ADMIN — APIXABAN 5 MG: 5 TABLET, FILM COATED ORAL at 22:19

## 2023-10-27 RX ADMIN — MIDODRINE HYDROCHLORIDE 10 MG: 5 TABLET ORAL at 17:18

## 2023-10-27 RX ADMIN — FUROSEMIDE 20 MG: 10 INJECTION, SOLUTION INTRAMUSCULAR; INTRAVENOUS at 04:10

## 2023-10-27 RX ADMIN — AMIODARONE HYDROCHLORIDE 1 MG/MIN: 50 INJECTION, SOLUTION INTRAVENOUS at 21:39

## 2023-10-27 RX ADMIN — NICOTINE 1 PATCH: 7 PATCH, EXTENDED RELEASE TRANSDERMAL at 08:22

## 2023-10-27 RX ADMIN — TICAGRELOR 90 MG: 90 TABLET ORAL at 08:17

## 2023-10-27 RX ADMIN — MAGNESIUM OXIDE TAB 400 MG (241.3 MG ELEMENTAL MG) 400 MG: 400 (241.3 MG) TAB at 04:10

## 2023-10-27 RX ADMIN — POTASSIUM & SODIUM PHOSPHATES POWDER PACK 280-160-250 MG 1 PACKET: 280-160-250 PACK at 08:59

## 2023-10-27 RX ADMIN — Medication 12.5 MG: at 22:19

## 2023-10-27 RX ADMIN — POTASSIUM & SODIUM PHOSPHATES POWDER PACK 280-160-250 MG 1 PACKET: 280-160-250 PACK at 05:42

## 2023-10-27 RX ADMIN — MIDODRINE HYDROCHLORIDE 10 MG: 5 TABLET ORAL at 08:15

## 2023-10-27 RX ADMIN — HEPARIN SODIUM 5000 UNITS: 5000 INJECTION, SOLUTION INTRAVENOUS; SUBCUTANEOUS at 11:53

## 2023-10-27 RX ADMIN — MIDODRINE HYDROCHLORIDE 10 MG: 5 TABLET ORAL at 11:52

## 2023-10-27 RX ADMIN — FUROSEMIDE 20 MG: 10 INJECTION, SOLUTION INTRAMUSCULAR; INTRAVENOUS at 16:21

## 2023-10-27 RX ADMIN — Medication 12.5 MG: at 14:57

## 2023-10-27 RX ADMIN — ATORVASTATIN CALCIUM 80 MG: 80 TABLET, FILM COATED ORAL at 08:17

## 2023-10-27 RX ADMIN — TICAGRELOR 90 MG: 90 TABLET ORAL at 22:19

## 2023-10-27 ASSESSMENT — ACTIVITIES OF DAILY LIVING (ADL)
ADLS_ACUITY_SCORE: 34
ADLS_ACUITY_SCORE: 33
ADLS_ACUITY_SCORE: 34
ADLS_ACUITY_SCORE: 33
ADLS_ACUITY_SCORE: 33
ADLS_ACUITY_SCORE: 34
ADLS_ACUITY_SCORE: 33
ADLS_ACUITY_SCORE: 34

## 2023-10-27 NOTE — PROGRESS NOTES
I Professor Lyles, saw and evaluated the patient as part of a shared APRN/PA visit. I have seen and examined the patient with the CSI team and reviewed the pertinent laboratory results. I agree with the assessment and plan of the progress note above. I spent 40 minutes face-to-face and/or discussing and coordinating care.   Rob Lyles MD  Interventional Cardiology  Pager: [phone]    Interventional Cardiology Progress Note    Assessment & Plan:  Mr Duane C Johnson is a 73 year old gentleman with a history of HTN, HLD, and tobacco use presented to ED 10/26 with acute chest pain and EKG demonstrating anterior STEMI.     # STEMI s/p PCI LAD  # Acute Systolic Heart Failure (25-30%) 2/2 ICM  # NSVT  # HTN  # HLD   Patient presented to ED via EMS with acute chest pain that started the evening of 10/25. ECG at that time demonstrated anterior STEMI. Cath lab was activated and patient received BRENDA x1 to LAD. Post procedure patient briefly on pressors. Patient's blood pressures continue to be soft with SBP in 80s.     - TTE 10/26: EF 25-30% with anterior and anteroseptal akinesis  - DAPT: Eliquis (given akinetic WMA and risk for thrombus), Brilinta 90 mg BID x 1 year  - Stop 81 mg ASA, does not need triple therapy  - Volume status: mildly hypervolemic on exam, xray with mild congestion, give Lasix 20 mg IV x 1  - BB: Start Lopressor 12.5 mg BID  - ACEi/ARB: not yet started given HOTN, if able start prior to discharge  - Continue Midodrine for now, expect should be able to decrease   - Statin: Continue PTA Atorvastatin 80mg daily  - Per Dr. Lyles, repeat echo in 1 month, if EF improved, and no LV thrombus can consider stopping Eliquis, but will need to resume a 81 mg ASA daily for stent  - Ok to stop Amiodarone, pt with frequent runs of NSVT, starting BB as listed above  - Cardiac Rehab as OP  - Telemetry    # Tobacco use  Patient currently a 0.25 ppd smoker  - NRT ordered with patch in place  - Encourage  "cessation    Prophylaxis: DOAC   Diet: Low Sat Fat  Activity: as tolerated  Code Status: full code  Disposition: 1-2 days pending stable BP    Patient seen and discussed w/ Dr. Lyles .    NADEGE Munoz, CNP  [facility] Structural/Interventional Cardiology   Pager [phone]      Interval History:  - No acute events overnight  - Patient continues to have frequent runs of PVC's, at times NSVT  - Pt reports intermittently feeling palpitations and \"congestion\" with movement  - His BP remains soft, but he denies any dizziness    Most recent vital signs:  BP (!) 86/66 (BP Location: Left arm, Patient Position: Supine, Cuff Size: Adult Regular)   Pulse 72   Temp 99  F (37.2  C) (Oral)   Resp 18   Ht 1.676 m (5' 5.98\")   Wt 65.4 kg (144 lb 2.9 oz)   SpO2 96%   BMI 23.28 kg/m    Temp:  [97.7  F (36.5  C)-99  F (37.2  C)] 99  F (37.2  C)  Pulse:  [50-77] 72  Resp:  [10-37] 18  BP: ()/(48-87) 86/66  SpO2:  [94 %-100 %] 96 %  Wt Readings from Last 2 Encounters:   10/27/23 65.4 kg (144 lb 2.9 oz)   10/09/23 67.1 kg (148 lb)       Intake/Output Summary (Last 24 hours) at 10/27/2023 1348  Last data filed at 10/27/2023 1328  Gross per 24 hour   Intake 583.58 ml   Output 990 ml   Net -406.42 ml       Physical exam:  General: In bed, in NAD  HEENT: EOMI, PERRLA, no scleral icterus or injection  CARDIAC: RRR, no m/r/g appreciated. Peripheral pulses 2+  RESP: CTAB, fine crackles in bases  GI: NABS, NT/ND, no guarding or rebound  EXTREMITIES: NO LE edema, pulses 2+. Radial access site w/o bleeding, dressing c/d/i. No bruits appreciated.   SKIN: No acute lesions appreciated  NEURO: Alert and oriented X3    Labs (Past three days):  CBC  Recent Labs   Lab 10/27/23  0418 10/26/23  1704 10/26/23  0336 10/26/23  0059   WBC 12.2* 12.2* 15.1* 12.8*   RBC 3.36* 3.54* 3.87* 4.42   HGB 11.0* 11.7* 12.5* 14.4   HCT 31.4* 32.7* 35.8* 41.1   MCV 94 92 93 93   MCH 32.7 33.1* 32.3 32.6   MCHC 35.0 35.8 34.9 35.0   RDW 13.0 12.8 12.6 12.7   PLT " 247 278 350 348     BMP  Recent Labs   Lab 10/27/23  1046 10/27/23  0828 10/27/23  0418 10/26/23  2015 10/26/23  2014 10/26/23  1704 10/26/23  0341 10/26/23  0336     --  138 135  --  136   < > 135   POTASSIUM 4.2  --  4.0 4.5  --  4.2   < > 3.7   CHLORIDE 103  --  106 103  --  104   < > 101   CO2 24  --  23 23  --  23   < > 24   ANIONGAP 11  --  9 9  --  9   < > 10   * 113* 100* 161*   < > 130*   < > 158*   BUN 16.8  --  15.8 11.2  --  9.2   < > 6.8*   CR 0.90  --  0.79 0.91  --  0.91   < > 0.79   GFRESTIMATED 90  --  >90 89  --  89   < > >90   PRANAV 9.0  --  8.1* 8.7*  --  8.6*   < > 8.5*   MAG  --   --  2.1 1.9  --   --   --  1.8   PHOS  --   --  2.1* 2.8  --   --   --  2.6    < > = values in this interval not displayed.     Troponins:   Lab Results   Component Value Date    CTROPT 7,637 () 10/27/2023    CTROPT 8,624 () 10/27/2023    CTROPT >10,000 () 10/26/2023    CTROPT >10,000 () 10/26/2023    CTROPT >10,000 () 10/26/2023        INR  Recent Labs   Lab 10/26/23  0059   INR 1.10     Liver panel  Recent Labs   Lab 10/27/23  1046 10/27/23  0418 10/26/23  2015 10/26/23  1704   PROTTOTAL 6.5 6.2* 6.2* 6.2*   ALBUMIN 3.6 3.3* 3.5 3.5   BILITOTAL 0.6 0.6 0.6 0.6   ALKPHOS 84 80 85 86   * 481* 559* 586*   ALT 67 68 70 71*       Imaging/procedure results:  EKG 12 Lead 10/27/2023: NSR HR 70    ECHO 10/26/23:  Interpretation Summary  Left ventricular function is decreased. Biplane LVEF is 29%. Severe  hypokinesis of all segments except basal inferior and inferolateral. Anterior  and anteroseptal segments are akinetic. No LV thrombus noted.  Global right ventricular function is borderline reduced.  No significant valve abnormalities.  IVC diameter and respiratory changes fall into an intermediate range  suggesting an RA pressure of 8 mmHg.  There is no prior study for direct comparison.    Coronary Angiogram 10/26/23:  Left Main   The vessel is large and is angiographically normal.      Left  Anterior Descending   The vessel is large.   Prox LAD to Mid LAD lesion is 100% stenosed.   Dist LAD lesion is 100% stenosed.      First Diagonal Branch   1st Diag-1 lesion is 80% stenosed.   1st Diag-2 lesion is 50% stenosed.      Lateral First Diagonal Branch   The vessel is moderate in size. The vessel exhibits minimal luminal irregularities.      Left Circumflex   The vessel is large.      First Obtuse Marginal Branch   The vessel is moderate in size and is angiographically normal.   1st Mrg lesion is 20% stenosed.      Second Obtuse Marginal Branch   The vessel is moderate in size and is angiographically normal.      Third Obtuse Marginal Branch   The vessel is small and is angiographically normal.      Right Coronary Artery   The vessel is large.   Dist RCA lesion is 40% stenosed.      Right Posterior Descending Artery   The vessel is large. The vessel exhibits minimal luminal irregularities.   RPDA lesion is 70% stenosed.      First Right Posterolateral Branch   The vessel is large. The vessel exhibits minimal luminal irregularities.      Second Right Posterolateral Branch   The vessel is large. The vessel exhibits minimal luminal irregularities.         Intervention   Prox LAD to Mid LAD lesion   Stent   Lesion length: 20 mm. There is no pre-interventional antegrade distal flow (RAIZA 0). A stent was successfully placed. The post-interventional distal flow is normal (RAIZA 3). Left main was engaged with a 6 Fr XB 3.0 GC. Heparin was given to maintain ACT > 250 s throughout the procedure. A 0.014''  50 wire was advanced across the p LAD occlusion to the diagonal 1. pLAD was predilated with an Emerge 2.50x8mm and an Emerge 2.50x20 mm balloon. A 0.014'' Runthrough wire was advanced to the distal LAD. Ostium of diagonal 1 was predilated with an Emerge 2.50x8 mm balloon.  50 wire was removed and pLAD to mLAD was stented with a Synergy 2.87l87pp BRENDA. BRENDA was post dilated with an NC Emerge 2.75x12 mm  balloon. Post intervention angiogram showed no dissection or other complication.   There is a 0% residual stenosis post intervention.             Medical Decision Making   60 MINUTES SPENT BY ME on the date of service doing chart review, history, exam, documentation & further activities per the note.

## 2023-10-27 NOTE — PLAN OF CARE
Goal Outcome Evaluation:      Plan of Care Reviewed With: patient             Neuro: Alert and oriented X 4.   Cardiac: Sinus Rhythm with intermittent NSVT. Asymptomatic  Respiratory: O2 at 2 lpm via NC.   GI/: Adequate urine output.   Diet/appetite: Consumed share with good appetite.   Activity:  Assist of 1 gait belt.   Pain: No reported pain.   Skin: No new deficits noted.  Plan: Refer if NSVT > 30 seconds or if with symptoms. Notify primary team with changes.

## 2023-10-27 NOTE — PLAN OF CARE
ICU End of Shift Summary. See flowsheets for vital signs and detailed assessment.    Changes this shift: Alert, oriented, following all commands. Weaned to 2L NC. Poor appetite. Complained of pressure on chest around 1800, EKG done, provider at bedside, nitroglycerin given.     Plan: may require lasix per CSI    Goal Outcome Evaluation:      Plan of Care Reviewed With: patient, spouse    Overall Patient Progress: improvingOverall Patient Progress: improving    Outcome Evaluation: weaned off pressors, decreased ectopy throughout shift

## 2023-10-27 NOTE — PLAN OF CARE
ICU End of Shift Summary. Please see flowsheets for vital signs and detailed assessment data.    Changes this shift: Patient remains alert and oriented to person, place, time, and situation, moving all extremities, and following commands. Patient weaned to room air from NC. Patient denies chest pain and chest pressure. Patient with frequent premature ventricular and atrial contractions. Magnesium replaced at HS, with decrease in ectopy observed thereafter. Phosphorous replaced. Methocarbamol given for subjective reports of muscle tension. Patient bladder scanned at 00:00, yielding 91 mL. Furosemide 20 mg given once, with subsequent void of 475 mL. Patient largely awake overnight--suspected to be secondary to the frequency of blood pressure monitoring.     Plan: Continue with current plan of care promoting activity as able with anticipated transfer to [facility].         Moises Valdez RN  Critical Care Flex Team

## 2023-10-27 NOTE — PROGRESS NOTES
Report given to: Sanjay Sky    Time of transfer: 1005    Transferred to: 6C    Belongings sent:Yes    Family updated:Yes    Reviewed pertinent information from EPIC (EMAR/Clinical Summary/Flowsheets):Yes    Head-to-toe assessment with receiving RN:No    Recommendations (e.g. Family needs/recent issues/things to watch for): reported that pt's ectopy needed follow up- having increasing ectopy and runs of afib. Team was updated at 0900 and pt is asymptomatic.

## 2023-10-27 NOTE — CONSULTS
Discharge Pharmacy Test Claim    Patient does not have pharmacy benefits or a Medicare Part D plan. Cash price for 30 days of DOACs listed below.    Test Claim Price Free Trial Voucher   eliquis 717.76 Yes   xarelto 694.61 Yes     Resources  [facility] pharmacy can apply a 30-day free trial voucher for eliquis or xarelto available.     Voucher Link   Eliquis https://[employer]/6822/landingPage.html?src=RADHA#   Xarelto https://Catch ResourcesrvJeNaCell.[employer]/Coupon/xareltotrialoffer     Both eliquis and xarelto have income-based patient assistance programs that ships free drug directly to pt's home if eligible. Eligibilty information and applications can be found below    Eliquis    [facility] [phone]   Application http://www.DreamsCloudpaf.org       Xarelto    [employer] [phone]  https://www.Options Awayf.org   Application http://www.Options Awayf.org/resources/jjpaf-application.pdf     For comparison, 14 syringes of enoxaparin without insurance is $293.04 and 30 tablets of jantoven/warfarin is $16.     Patient can call [place]'s [facility] Linkage Line for Medicare Part D enrollment options at [phone].      Alexa Gaona  [facility] Pharmacy Liaison  Ph: [phone]  Fax: [phone]  Available on Vocera (learn more here)

## 2023-10-27 NOTE — PROGRESS NOTES
Admitted/transferred from:  4201  Reason for admission/transfer: decreased level of care  2 RN skin assessment: completed by Sanjay RN BLADE Gaona  Result of skin assessment and interventions/actions: R rad site wdl, old scars to Leg, scattered small scabs  Patient belongings (see Flowsheet)  MDRO education added to care plan Yes  ?

## 2023-10-28 LAB
ALBUMIN SERPL BCG-MCNC: 3.3 G/DL (ref 3.5–5.2)
ALBUMIN SERPL BCG-MCNC: 3.5 G/DL (ref 3.5–5.2)
ALP SERPL-CCNC: 75 U/L (ref 40–129)
ALP SERPL-CCNC: 83 U/L (ref 40–129)
ALT SERPL W P-5'-P-CCNC: 53 U/L (ref 0–70)
ALT SERPL W P-5'-P-CCNC: 63 U/L (ref 0–70)
ANION GAP SERPL CALCULATED.3IONS-SCNC: 11 MMOL/L (ref 7–15)
ANION GAP SERPL CALCULATED.3IONS-SCNC: 11 MMOL/L (ref 7–15)
APO A-I SERPL-MCNC: 26 MG/DL
AST SERPL W P-5'-P-CCNC: 203 U/L (ref 0–45)
AST SERPL W P-5'-P-CCNC: 261 U/L (ref 0–45)
ATRIAL RATE - MUSE: 62 BPM
ATRIAL RATE - MUSE: 63 BPM
ATRIAL RATE - MUSE: 66 BPM
BILIRUB SERPL-MCNC: 0.7 MG/DL
BILIRUB SERPL-MCNC: 0.9 MG/DL
BUN SERPL-MCNC: 22.9 MG/DL (ref 8–23)
BUN SERPL-MCNC: 27.2 MG/DL (ref 8–23)
CALCIUM SERPL-MCNC: 8.5 MG/DL (ref 8.8–10.2)
CALCIUM SERPL-MCNC: 8.5 MG/DL (ref 8.8–10.2)
CHLORIDE SERPL-SCNC: 103 MMOL/L (ref 98–107)
CHLORIDE SERPL-SCNC: 104 MMOL/L (ref 98–107)
CREAT SERPL-MCNC: 0.95 MG/DL (ref 0.67–1.17)
CREAT SERPL-MCNC: 1.14 MG/DL (ref 0.67–1.17)
DEPRECATED HCO3 PLAS-SCNC: 23 MMOL/L (ref 22–29)
DEPRECATED HCO3 PLAS-SCNC: 23 MMOL/L (ref 22–29)
DIASTOLIC BLOOD PRESSURE - MUSE: NORMAL MMHG
EGFRCR SERPLBLD CKD-EPI 2021: 68 ML/MIN/1.73M2
EGFRCR SERPLBLD CKD-EPI 2021: 85 ML/MIN/1.73M2
ERYTHROCYTE [DISTWIDTH] IN BLOOD BY AUTOMATED COUNT: 13.2 % (ref 10–15)
GLUCOSE BLDC GLUCOMTR-MCNC: 113 MG/DL (ref 70–99)
GLUCOSE SERPL-MCNC: 108 MG/DL (ref 70–99)
GLUCOSE SERPL-MCNC: 91 MG/DL (ref 70–99)
HCT VFR BLD AUTO: 35.6 % (ref 40–53)
HGB BLD-MCNC: 12 G/DL (ref 13.3–17.7)
INTERPRETATION ECG - MUSE: NORMAL
LACTATE SERPL-SCNC: 1.3 MMOL/L (ref 0.7–2)
LACTATE SERPL-SCNC: 1.8 MMOL/L (ref 0.7–2)
LACTATE SERPL-SCNC: 1.9 MMOL/L (ref 0.7–2)
LACTATE SERPL-SCNC: 2 MMOL/L (ref 0.7–2)
LACTATE SERPL-SCNC: 2.1 MMOL/L (ref 0.7–2)
MAGNESIUM SERPL-MCNC: 2.2 MG/DL (ref 1.7–2.3)
MCH RBC QN AUTO: 32.2 PG (ref 26.5–33)
MCHC RBC AUTO-ENTMCNC: 33.7 G/DL (ref 31.5–36.5)
MCV RBC AUTO: 95 FL (ref 78–100)
P AXIS - MUSE: 39 DEGREES
P AXIS - MUSE: 47 DEGREES
P AXIS - MUSE: 50 DEGREES
PLATELET # BLD AUTO: 213 10E3/UL (ref 150–450)
POTASSIUM SERPL-SCNC: 4.1 MMOL/L (ref 3.4–5.3)
POTASSIUM SERPL-SCNC: 4.3 MMOL/L (ref 3.4–5.3)
PR INTERVAL - MUSE: 196 MS
PR INTERVAL - MUSE: 198 MS
PR INTERVAL - MUSE: 220 MS
PROT SERPL-MCNC: 6.2 G/DL (ref 6.4–8.3)
PROT SERPL-MCNC: 6.5 G/DL (ref 6.4–8.3)
QRS DURATION - MUSE: 86 MS
QRS DURATION - MUSE: 88 MS
QRS DURATION - MUSE: 92 MS
QT - MUSE: 422 MS
QT - MUSE: 462 MS
QT - MUSE: 472 MS
QTC - MUSE: 442 MS
QTC - MUSE: 472 MS
QTC - MUSE: 479 MS
R AXIS - MUSE: -7 DEGREES
R AXIS - MUSE: 7 DEGREES
R AXIS - MUSE: 8 DEGREES
RBC # BLD AUTO: 3.73 10E6/UL (ref 4.4–5.9)
SODIUM SERPL-SCNC: 137 MMOL/L (ref 135–145)
SODIUM SERPL-SCNC: 138 MMOL/L (ref 135–145)
SYSTOLIC BLOOD PRESSURE - MUSE: NORMAL MMHG
T AXIS - MUSE: 27 DEGREES
T AXIS - MUSE: 38 DEGREES
T AXIS - MUSE: 55 DEGREES
TROPONIN T SERPL HS-MCNC: 6006 NG/L
VENTRICULAR RATE- MUSE: 62 BPM
VENTRICULAR RATE- MUSE: 63 BPM
VENTRICULAR RATE- MUSE: 66 BPM
WBC # BLD AUTO: 12.8 10E3/UL (ref 4–11)

## 2023-10-28 PROCEDURE — 84484 ASSAY OF TROPONIN QUANT: CPT | Performed by: STUDENT IN AN ORGANIZED HEALTH CARE EDUCATION/TRAINING PROGRAM

## 2023-10-28 PROCEDURE — 83605 ASSAY OF LACTIC ACID: CPT | Performed by: STUDENT IN AN ORGANIZED HEALTH CARE EDUCATION/TRAINING PROGRAM

## 2023-10-28 PROCEDURE — 36415 COLL VENOUS BLD VENIPUNCTURE: CPT | Performed by: STUDENT IN AN ORGANIZED HEALTH CARE EDUCATION/TRAINING PROGRAM

## 2023-10-28 PROCEDURE — 250N000013 HC RX MED GY IP 250 OP 250 PS 637: Performed by: STUDENT IN AN ORGANIZED HEALTH CARE EDUCATION/TRAINING PROGRAM

## 2023-10-28 PROCEDURE — 83695 ASSAY OF LIPOPROTEIN(A): CPT | Performed by: NURSE PRACTITIONER

## 2023-10-28 PROCEDURE — 83735 ASSAY OF MAGNESIUM: CPT | Performed by: STUDENT IN AN ORGANIZED HEALTH CARE EDUCATION/TRAINING PROGRAM

## 2023-10-28 PROCEDURE — 93005 ELECTROCARDIOGRAM TRACING: CPT

## 2023-10-28 PROCEDURE — 250N000011 HC RX IP 250 OP 636: Mod: JZ | Performed by: STUDENT IN AN ORGANIZED HEALTH CARE EDUCATION/TRAINING PROGRAM

## 2023-10-28 PROCEDURE — 200N000002 HC R&B ICU UMMC

## 2023-10-28 PROCEDURE — 250N000013 HC RX MED GY IP 250 OP 250 PS 637: Performed by: NURSE PRACTITIONER

## 2023-10-28 PROCEDURE — 84450 TRANSFERASE (AST) (SGOT): CPT | Performed by: STUDENT IN AN ORGANIZED HEALTH CARE EDUCATION/TRAINING PROGRAM

## 2023-10-28 PROCEDURE — 250N000011 HC RX IP 250 OP 636: Performed by: INTERNAL MEDICINE

## 2023-10-28 PROCEDURE — 250N000013 HC RX MED GY IP 250 OP 250 PS 637

## 2023-10-28 PROCEDURE — 85027 COMPLETE CBC AUTOMATED: CPT | Performed by: NURSE PRACTITIONER

## 2023-10-28 PROCEDURE — 99291 CRITICAL CARE FIRST HOUR: CPT | Mod: GC | Performed by: INTERNAL MEDICINE

## 2023-10-28 PROCEDURE — 84155 ASSAY OF PROTEIN SERUM: CPT | Performed by: STUDENT IN AN ORGANIZED HEALTH CARE EDUCATION/TRAINING PROGRAM

## 2023-10-28 PROCEDURE — 93010 ELECTROCARDIOGRAM REPORT: CPT | Performed by: INTERNAL MEDICINE

## 2023-10-28 PROCEDURE — 258N000003 HC RX IP 258 OP 636: Performed by: STUDENT IN AN ORGANIZED HEALTH CARE EDUCATION/TRAINING PROGRAM

## 2023-10-28 RX ORDER — ONDANSETRON 4 MG/1
4 TABLET, ORALLY DISINTEGRATING ORAL EVERY 6 HOURS PRN
Status: DISCONTINUED | OUTPATIENT
Start: 2023-10-28 | End: 2023-10-28

## 2023-10-28 RX ORDER — HYDRALAZINE HYDROCHLORIDE 20 MG/ML
10 INJECTION INTRAMUSCULAR; INTRAVENOUS EVERY 4 HOURS PRN
Status: DISCONTINUED | OUTPATIENT
Start: 2023-10-28 | End: 2023-10-28

## 2023-10-28 RX ORDER — MIDODRINE HYDROCHLORIDE 5 MG/1
10 TABLET ORAL EVERY 8 HOURS SCHEDULED
Status: DISCONTINUED | OUTPATIENT
Start: 2023-10-28 | End: 2023-10-29

## 2023-10-28 RX ORDER — SENNOSIDES 8.6 MG
8.6 TABLET ORAL 2 TIMES DAILY
Status: DISCONTINUED | OUTPATIENT
Start: 2023-10-28 | End: 2023-10-28

## 2023-10-28 RX ORDER — AMOXICILLIN 250 MG
2 CAPSULE ORAL 2 TIMES DAILY
Status: DISCONTINUED | OUTPATIENT
Start: 2023-10-28 | End: 2023-11-01

## 2023-10-28 RX ORDER — NITROGLYCERIN 0.4 MG/1
0.4 TABLET SUBLINGUAL EVERY 5 MIN PRN
Status: DISCONTINUED | OUTPATIENT
Start: 2023-10-28 | End: 2023-10-28

## 2023-10-28 RX ORDER — POLYETHYLENE GLYCOL 3350 17 G/17G
17 POWDER, FOR SOLUTION ORAL DAILY
Status: DISCONTINUED | OUTPATIENT
Start: 2023-10-28 | End: 2023-10-30

## 2023-10-28 RX ORDER — FUROSEMIDE 10 MG/ML
20 INJECTION INTRAMUSCULAR; INTRAVENOUS ONCE
Status: COMPLETED | OUTPATIENT
Start: 2023-10-28 | End: 2023-10-28

## 2023-10-28 RX ORDER — HYDROXYZINE HYDROCHLORIDE 25 MG/1
25 TABLET, FILM COATED ORAL EVERY 6 HOURS PRN
Status: DISCONTINUED | OUTPATIENT
Start: 2023-10-28 | End: 2023-11-03 | Stop reason: HOSPADM

## 2023-10-28 RX ORDER — OXYCODONE HYDROCHLORIDE 10 MG/1
10 TABLET ORAL EVERY 4 HOURS PRN
Status: DISCONTINUED | OUTPATIENT
Start: 2023-10-28 | End: 2023-10-28

## 2023-10-28 RX ORDER — HYDROXYZINE HYDROCHLORIDE 50 MG/1
50 TABLET, FILM COATED ORAL EVERY 6 HOURS PRN
Status: DISCONTINUED | OUTPATIENT
Start: 2023-10-28 | End: 2023-11-03 | Stop reason: HOSPADM

## 2023-10-28 RX ORDER — ONDANSETRON 2 MG/ML
4 INJECTION INTRAMUSCULAR; INTRAVENOUS EVERY 6 HOURS PRN
Status: DISCONTINUED | OUTPATIENT
Start: 2023-10-28 | End: 2023-10-28

## 2023-10-28 RX ORDER — METOPROLOL TARTRATE 1 MG/ML
5 INJECTION, SOLUTION INTRAVENOUS
Status: DISCONTINUED | OUTPATIENT
Start: 2023-10-28 | End: 2023-10-28

## 2023-10-28 RX ORDER — SODIUM CHLORIDE 9 MG/ML
INJECTION, SOLUTION INTRAVENOUS CONTINUOUS
Status: DISCONTINUED | OUTPATIENT
Start: 2023-10-28 | End: 2023-10-28

## 2023-10-28 RX ORDER — OXYCODONE HYDROCHLORIDE 5 MG/1
5 TABLET ORAL EVERY 4 HOURS PRN
Status: DISCONTINUED | OUTPATIENT
Start: 2023-10-28 | End: 2023-10-28

## 2023-10-28 RX ORDER — FUROSEMIDE 10 MG/ML
40 INJECTION INTRAMUSCULAR; INTRAVENOUS ONCE
Status: COMPLETED | OUTPATIENT
Start: 2023-10-28 | End: 2023-10-28

## 2023-10-28 RX ORDER — DOCUSATE SODIUM 100 MG/1
100 CAPSULE, LIQUID FILLED ORAL 2 TIMES DAILY
Status: DISCONTINUED | OUTPATIENT
Start: 2023-10-28 | End: 2023-10-28

## 2023-10-28 RX ADMIN — SENNOSIDES AND DOCUSATE SODIUM 2 TABLET: 8.6; 5 TABLET ORAL at 21:02

## 2023-10-28 RX ADMIN — ACETAMINOPHEN 650 MG: 325 TABLET, FILM COATED ORAL at 10:26

## 2023-10-28 RX ADMIN — METHOCARBAMOL 750 MG: 750 TABLET, FILM COATED ORAL at 00:15

## 2023-10-28 RX ADMIN — ACETAMINOPHEN 650 MG: 325 TABLET, FILM COATED ORAL at 04:32

## 2023-10-28 RX ADMIN — Medication 12.5 MG: at 08:14

## 2023-10-28 RX ADMIN — MIDODRINE HYDROCHLORIDE 10 MG: 5 TABLET ORAL at 21:02

## 2023-10-28 RX ADMIN — TICAGRELOR 90 MG: 90 TABLET ORAL at 21:03

## 2023-10-28 RX ADMIN — APIXABAN 5 MG: 5 TABLET, FILM COATED ORAL at 08:13

## 2023-10-28 RX ADMIN — MIDODRINE HYDROCHLORIDE 10 MG: 5 TABLET ORAL at 15:28

## 2023-10-28 RX ADMIN — ATORVASTATIN CALCIUM 80 MG: 80 TABLET, FILM COATED ORAL at 08:13

## 2023-10-28 RX ADMIN — APIXABAN 5 MG: 5 TABLET, FILM COATED ORAL at 21:03

## 2023-10-28 RX ADMIN — OXYCODONE HYDROCHLORIDE 5 MG: 5 TABLET ORAL at 21:59

## 2023-10-28 RX ADMIN — OXYCODONE HYDROCHLORIDE 5 MG: 5 TABLET ORAL at 00:15

## 2023-10-28 RX ADMIN — FUROSEMIDE 40 MG: 10 INJECTION, SOLUTION INTRAVENOUS at 10:26

## 2023-10-28 RX ADMIN — TICAGRELOR 90 MG: 90 TABLET ORAL at 08:13

## 2023-10-28 RX ADMIN — Medication 12.5 MG: at 21:03

## 2023-10-28 RX ADMIN — SODIUM CHLORIDE: 9 INJECTION, SOLUTION INTRAVENOUS at 04:32

## 2023-10-28 RX ADMIN — ACETAMINOPHEN 650 MG: 325 TABLET, FILM COATED ORAL at 00:15

## 2023-10-28 RX ADMIN — AMIODARONE HYDROCHLORIDE 1 MG/MIN: 50 INJECTION, SOLUTION INTRAVENOUS at 13:26

## 2023-10-28 RX ADMIN — HYALURONIDASE (HUMAN RECOMBINANT) 150 UNITS: 150 INJECTION, SOLUTION SUBCUTANEOUS at 13:29

## 2023-10-28 RX ADMIN — FUROSEMIDE 20 MG: 10 INJECTION, SOLUTION INTRAMUSCULAR; INTRAVENOUS at 06:03

## 2023-10-28 RX ADMIN — METHOCARBAMOL 750 MG: 750 TABLET, FILM COATED ORAL at 21:59

## 2023-10-28 RX ADMIN — SENNOSIDES AND DOCUSATE SODIUM 2 TABLET: 8.6; 5 TABLET ORAL at 10:25

## 2023-10-28 RX ADMIN — MIDODRINE HYDROCHLORIDE 10 MG: 5 TABLET ORAL at 08:14

## 2023-10-28 ASSESSMENT — ACTIVITIES OF DAILY LIVING (ADL)
DEPENDENT_IADLS:: INDEPENDENT
ADLS_ACUITY_SCORE: 32
ADLS_ACUITY_SCORE: 31
ADLS_ACUITY_SCORE: 31
ADLS_ACUITY_SCORE: 32
ADLS_ACUITY_SCORE: 31
ADLS_ACUITY_SCORE: 32
ADLS_ACUITY_SCORE: 32

## 2023-10-28 NOTE — CONSULTS
"Care Management Initial Consult    General Information  Assessment completed with: Patient, Spouse or significant other, Duane and Sharon  Type of CM/SW Visit: Initial Assessment    Primary Care Provider verified and updated as needed: Yes   Readmission within the last 30 days: no previous admission in last 30 days      Reason for Consult: discharge planning  Advance Care Planning: Advance Care Planning Reviewed: no concerns identified  patient is not interested in filling out a HCD nor having a form provided. He understands that the policy in the absence of a document would be to go to his NOK - this would be his wife. Encouraged patient to have conversation with his wife about his care wishes if he were not able to communicate.     Communication Assessment  Patient's communication style: spoken language (English or Bilingual)    Hearing Difficulty or Deaf: no   Wear Glasses or Blind: yes    Cognitive  Cognitive/Neuro/Behavioral: WDL  Level of Consciousness: alert  Arousal Level: opens eyes spontaneously  Orientation: oriented x 4  Mood/Behavior: calm, cooperative  Best Language: 0 - No aphasia  Speech: clear, spontaneous    Living Environment:   People in home: spouse  Melissa  Current living Arrangements: house      Able to return to prior arrangements: yes  Living Arrangement Comments: patient spends most time in basement (he is a \"pack rat\" per wife and contains all of his belongings down there or in his 2 pole francisca. His wife has the 1st floor and the bedrooms are on the 2nd floor.    Family/Social Support:  Care provided by: self  Provides care for: no one  Marital Status:   Wife (sister  from [place]) he has friends on the internet; Melissa       Description of Support System: Supportive, Involved    Support Assessment: Adequate family and caregiver support, Adequate social supports    Current Resources:   Patient receiving home care services: No     Community Resources: None  Equipment currently " used at home: wheelchair, manual (in one of his pole francisca)  Supplies currently used at home: None    Employment/Financial:  Employment Status: retired     Employment/ Comments: wife works FT for [employer]; M/Fr [facility] [place] and [employer] at main office in [facility]  Financial Concerns: none   Referral to Financial Worker: No     Does the patient's insurance plan have a 3 day qualifying hospital stay waiver?  No    Lifestyle & Psychosocial Needs:  Social Determinants of Health     Food Insecurity: Low Risk  (10/3/2023)    Food Insecurity     Within the past 12 months, did you worry that your food would run out before you got money to buy more?: No     Within the past 12 months, did the food you bought just not last and you didn t have money to get more?: No   Depression: Not at risk (3/13/2023)    PHQ-2     PHQ-2 Score: 0   Housing Stability: Low Risk  (10/3/2023)    Housing Stability     Do you have housing? : Yes     Are you worried about losing your housing?: No   Tobacco Use: High Risk (10/26/2023)    Patient History     Smoking Tobacco Use: Every Day     Smokeless Tobacco Use: Never     Passive Exposure: Current   Financial Resource Strain: Low Risk  (10/3/2023)    Financial Resource Strain     Within the past 12 months, have you or your family members you live with been unable to get utilities (heat, electricity) when it was really needed?: No   Alcohol Use: Not on file   Transportation Needs: Low Risk  (10/3/2023)    Transportation Needs     Within the past 12 months, has lack of transportation kept you from medical appointments, getting your medicines, non-medical meetings or appointments, work, or from getting things that you need?: No   Physical Activity: Not on file   Interpersonal Safety: Low Risk  (10/9/2023)    Interpersonal Safety     Do you feel physically and emotionally safe where you currently live?: Yes     Within the past 12 months, have you been hit, slapped, kicked or otherwise physically hurt  "by someone?: No     Within the past 12 months, have you been humiliated or emotionally abused in other ways by your partner or ex-partner?: No   Stress: Not on file   Social Connections: Not on file     Functional Status:  Prior to admission patient needed assistance:   Dependent ADLs:: Independent  Dependent IADLs:: Independent     Mental Health Status:  Mental Health Status: No Current Concerns       Chemical Dependency Status:  Chemical Dependency Status: No Current Concerns       He hand rolls his cigarettes and smokes 6-10 per day.         Values/Beliefs:  Spiritual, Cultural Beliefs, Taoist Practices, Values that affect care: no          Additional Information:    Spoke with patient and wife in room - patient lives in [place] in a 2 level hours with walkout basement. PTA he used no DME (except a WC in one of his pole francisca), had no services and was independent with all IADLs and ADLs. He is not interested in any discharge services unless medical staff can provide a \"legitimate\" reason for the services and then he will consider the option. His wife can provide a ride home and can WFH for the foreseeable future (she currently works M/Fr at home).     No needs identified at this time.    Ami Mcneil, [facility] MSW  10/28/2023       Social Work and Care Management Department     SEARCHABLE in [employer] - search SOCIAL WORK     [facility] (0800 - 1630) Saturday and Sunday     Units: 4A, 4C, & 4E Pager: [phone]     Units: 5A & 5C Pager: [phone]     Units: 6A & 6B   Pager: [phone]     Units: 6C Pager: [phone]     Units: 7A & 7B  Pager: [phone]     Units: 7C Pager: [phone]     Unit: [facility] ED Pager: [phone]      [facility] [place] (5840-3754) Saturday and Sunday      Units: 5 Ortho, 5 Med/Surg & WB ED  Pager:[phone]     Units: 6 Med/Surg, 8A, & 10A ICU  Pager: [phone]        After hours (1630 - 0000) Saturday & Sunday; (0431-7425) Mon-Fri; (8615-8055) [facility] Recognized " Holidays     Units: ALL  Pager: [phone]

## 2023-10-28 NOTE — PLAN OF CARE
"Goal Outcome Evaluation:      Plan of Care Reviewed With: patient, spouse          Outcome Evaluation: patient plans to go home at discharge - he says that is a medical professional provides a \"legitimate\" reason for homecare and he would consider it. Otherwise he will discharge home with his wife as caregiver. She is able to WF M-Fr      "

## 2023-10-28 NOTE — PROGRESS NOTES
Admitted/transferred from: 6C  Reason for admission/transfer: VF arrest while on 6C  Patient status upon admission/transfer: In multi-focal VF, arrested again in ICU (2 arrests today) subsequently brought down to cath lab. Previous stent patent.  Interventions: Started on amio and lidocaine gtt. Oxy, tylenol, robaxin for chest/back soreness. Lasix x1 for low UO.  Plan: Continue antiarrythmic's, monitor elevated Qtc  2 RN skin assessment: completed by Ag Gee  Sexual Orientation and Gender Identity (SOGI) smartfom completed: Done  Result of skin assessment and interventions/actions: R groin access site, abdominal bruising  Height, weight, drug calc weight: Done  Patient belongings (see Flowsheet - Adult Profile for details): Remains with patient

## 2023-10-28 NOTE — PROGRESS NOTES
[facility]    Cardiology Transfer Acceptance Note- Cardiology        Date of Admission:  10/26/2023     Assessment & Plan: HVSL   Mr Duane C Johnson is a 73 year old gentleman with a history of HTN, HLD, and tobacco use presented to ED 10/26 with acute chest pain and EKG demonstrating anterior STEMI. His course is complicated by subsequent VF/VT cardiac arrest x2 on 10/27/23. He is now transferred to the CICU.     ===Neuro===  #Pain/Agitation/Sedation  APAP and Oxycodone for pain, minimize sedatives in setting of post-arrest    #Post-Cardiac Arrest   #Risk for Anoxic Injury  Alert and responsive after DCCV x2 for VT/VF arrests, no focal neurologic findings. Defer cooling given intact neurologic status.   - Trend neurologic examinations, no role for therapeutic hypothermia or CNS imaging at this time    ===Cardiovascular===  #Ventricular Fibrillation Cardiac Arrest  #Anterior STEMI s/p PCI LAD  #Acute Systolic Heart Failure (25-30%) 2/2 ICM  #Cardiogenic Shock  #Coronary Artery Disease  #NSVT  #HTN  #HLD   Patient presented to ED via EMS with acute chest pain that started the evening of 10/25. ECG at that time demonstrated anterior STEMI. Cath lab was activated and patient received BRENDA x1 to LAD. Post-PCI echocardiogram with LVEF 30% and akinetic anterior wall.   Frequent runs of NSVT throughout hospital course. On 10/27/23, he developed ventricular fibrillation cardiac arrest with 1 round of compressions and 1x DCCV with ROSC. He had a subsequent VT arrest which was also responsive to 1x DCCV. Notably, the patient did not miss any DAPT doses. The patient was bolused amiodarone and lidocaine and transferred to the cardiac cath lab for angiography which demonstrated patent LAD stents, no   - Femoral access, bedrest per protocol   - Continue ASA and Ticagrelor   - Amiodarone and lidocaine infusions for management of ischemic ventricular tachycardia with cardiac  arrest    > Consider VT ablation evaluation if arrhythmia non-responsive to antiarrhythmics   - Hold home antihypertensives and trend blood pressures  - Continue Apixaban 5mg BID for LV thrombus prophylaxis   - Continue metoprolol tartrate 12.5 mg BID  - Additional GDMT pending stabilization       ===Pulmonary===  #Acute Hypoxic Respiratory Failure   On 5L supplemental oxygen after cardiac arrest. Likely combination of cath lab sedation and post-compression chest pain.   - Trend SPO2 and wean O2 as able  - Daily CXR, consider diuretics if evidence of pulmonary edema (currently euvolemic)       ===Renal===  #Lactic Acidosis  Lactate elevated to 3.0 during first cardiac arrest, slow down-trend to 2.1 since resuscitation.   - Trend to clearance       #Acute Hypokalemia   Resolved     ===Gastroenterology===  #Nutrition  Heart healthy diet pending bedside swallow study     #Ischemic Liver Injury  Mild LFT elevation in setting of STEMI and cardiac arrest.  - Trend with normal blood pressures       ===Infectious Disease===  No acute concerns     ===Hematology===  #Leukocytosis  Likely reactive in setting of cardiac arrest.  - Trend routine CBC and infectious symptoms     #Anemia  Suspected 2/2 procedural blood loss. No signs of bleeding.  - Trend routine CBC    FEN: HHD pending nursing bedside swallow evaluation   Analgesia: APAP and Oxycodone PRN  Sedation: NA  Anticoagulants: Apixaban  Ulcer PPX: NA  Glucose: sliding scale insulin   SBT/SAT: NA  Bowel Regimen: PRN  Tubes/Lines/Drains:  - PIV    This patient's care was discussed with Dr Ag Solorzano, attending cardiologist, who agrees with the assessment and plan unless otherwise indicated.    Juan Diego Weaver MD [facility], PGY-5  Fellow, Cardiovascular Disease           Clinically Significant Risk Factors        # Hypokalemia: Lowest K = 3 mmol/L in last 2 days, will replace as needed   # Hypocalcemia: Lowest Ca = 8.1 mg/dL in last 2 days, will monitor and replace as  appropriate     # Hypoalbuminemia: Lowest albumin = 3.3 g/dL at 10/27/2023  4:18 AM, will monitor as appropriate  # Coagulation Defect: INR = 1.23 (Ref range: 0.85 - 1.15) and/or PTT = 31 Seconds (Ref range: 22 - 38 Seconds), will monitor for bleeding    # Hypertension: Noted on problem list  # Acute heart failure with reduced ejection fraction: last echo with EF <40% and receiving IV diuretics                  Disposition Plan   TBD  Entered: Juan Diego Weaver MD 10/27/2023, 8:14 PM     ______________________________________________________________________    Interval History   2x VT/VF cardiac arrests, likely ischemia-mediated. S/p coronary angiography with patent LAD stents. Patient alert and neurologically intact. See code documentation for details.     Physical Exam   Vital Signs: Temp: 98.5  F (36.9  C) Temp src: Oral BP: 93/75 Pulse: 83 (Readings in monitor 's, 192 bpm. Provider was informed. Asymptomatic.)   Resp: 18 SpO2: 97 % O2 Device: Nasal cannula (Complained of SOB) Oxygen Delivery: 2 LPM  Weight: 144 lbs 2.89 oz  Exam:  Constitutional: Elderly appearing, alert, and no distress  Head: Normocephalic. No masses, lesions, or abnormalities  ENT: EOMI, no scleral icterus or injection, external nose and ears are normal  Cardiovascular: Regular rate and rhythm, no murmur appreciated, no rub, radial pulses 2+ and equal bilaterally  Respiratory: Clear to auscultation bilaterally, normal work of breathing, no wheeze, no rales  Gastrointestinal: Abdomen soft, non-tender, non-distended. No masses.   : Deferred  Musculoskeletal: extremities normal with no gross deformities noted, normal muscle tone  Skin: no suspicious lesions or rashes  Neurologic: Alert and responsive, grossly non-focal, moves all extremities, sensation grossly intact.   Psychiatric: mentation appears normal and affect normal/bright        Medical Decision Making             Data   I personally reviewed all laboratory and imaging data from  the last 24 hours in addition to all available cardiology data.

## 2023-10-28 NOTE — PROGRESS NOTES
"  Cardiology ICU Progress Note    Brief HPI:  Mr Duane C Johnson is a 73 year old gentleman with a history of HTN, HLD, and tobacco use presented to ED 10/26 with acute chest pain and EKG demonstrating anterior STEMI. His course is complicated by subsequent VF/VT cardiac arrest x2 on 10/27/23. He is now transferred to the CICU.    Subjective and Interval:  -No further episodes of sustained VT overnight  -Remains on amio at 1  -Will turn lidocaine gtt off  -Will give lasix 40 mg IV    Assessment and plan by system:    ===Neuro===  #Pain/Agitation/Sedation  Acetaminophen and Oxycodone PRN     ===Cardiovascular===  #Anterior STEMI s/p PCI  #VT/VF arrest on 10/28  #Coronary Artery Disease  #Cardiomyopathy with reduced EF 30-35%  #HTN  #HLD    - Continue amio gtt  - Will turn lidocaine gtt off  - Continue DAPT ASA and Ticagrelor, plan for 12 months of therapy   - Continue home Atorvastatin  - Continue metoprolol 12.5 mg BID  - Cardiac Rehab referral  - Telemetry monitoring   - Give IV lasix 40 mg today     ===Pulmonary===  No current concerns         ===Renal===  -JIE        ===Gastroenterology===  #Nutrition  Heart Healthy Diet        ===Infectious Disease===  #Leukocytosis  Likely reactive in s/o STEMI. Trend and monitor for infectious symptoms.      ===Hematology===  #Leukocytosis  As above.      FEN: HHD  Analgesia: Oxy PRN  Sedation: None  Anticoagulants: None  Ulcer PPX: None  Glucose: Consider sliding scale insulin   SBT/SAT: NA  Bowel Regimen: PRN    Patient seen and discussed with staff physician.    Objective:  Most recent vital signs:  BP 92/71   Pulse 63   Temp 98.3  F (36.8  C) (Oral)   Resp 21   Ht 1.676 m (5' 5.98\")   Wt 67.4 kg (148 lb 9.4 oz)   SpO2 95%   BMI 23.99 kg/m    Temp:  [97.8  F (36.6  C)-98.5  F (36.9  C)] 98.3  F (36.8  C)  Pulse:  [] 63  Resp:  [16-31] 21  BP: ()/(57-93) 92/71  SpO2:  [91 %-100 %] 95 %  Wt Readings from Last 2 Encounters:   10/28/23 67.4 kg (148 lb 9.4 oz) "   10/09/23 67.1 kg (148 lb)       Intake/Output Summary (Last 24 hours) at 10/26/2023 0952  Last data filed at 10/26/2023 0900  Gross per 24 hour   Intake 798.59 ml   Output --   Net 798.59 ml     Physical exam:  General: In bed, in NAD  HEENT: PERRL, no scleral icterus or injection  CARDIAC: RRR, no m/r/g appreciated. Peripheral pulses dopplered  RESP: Mechanical ventilation; CTAB, no wheezes, rhonchi or crackles appreciated.  GI: soft, BS hypoactive  : Michael  EXTREMITIES: NO LE edema, pulses 2+. Femoral access site w/o bleeding, dressing c/d/i.   SKIN: No acute lesions appreciated  NEURO: Intubated and sedated    Labs (Past three days):  CBC  Recent Labs   Lab 10/28/23  0550 10/27/23  1943 10/27/23  0418 10/26/23  1704   WBC 12.8* 14.4* 12.2* 12.2*   RBC 3.73* 3.61* 3.36* 3.54*   HGB 12.0* 11.9* 11.0* 11.7*   HCT 35.6* 33.6* 31.4* 32.7*   MCV 95 93 94 92   MCH 32.2 33.0 32.7 33.1*   MCHC 33.7 35.4 35.0 35.8   RDW 13.2 13.1 13.0 12.8    274 247 278     BMP  Recent Labs   Lab 10/28/23  0550 10/27/23  2210 10/27/23  2209 10/27/23  1944 10/27/23  1940 10/27/23  1046 10/27/23  0828 10/27/23  0418 10/26/23  2015 10/26/23  0341 10/26/23  0336    137  --  136 138 138  --  138 135   < > 135   POTASSIUM 4.1 4.4  --  4.1 4.1 4.2  --  4.0 4.5   < > 3.7   CHLORIDE 104 102  --  100 103 103  --  106 103   < > 101   CO2 23 24  --  24 27 24  --  23 23   < > 24   ANIONGAP 11 11  --  12  --  11  --  9 9   < > 10   GLC 91 109* 113* 120*  --  121*   < > 100* 161*   < > 158*   BUN 22.9 18.8  --  18.3  --  16.8  --  15.8 11.2   < > 6.8*   CR 0.95 0.93  --  1.00  --  0.90  --  0.79 0.91   < > 0.79   GFRESTIMATED 85 87  --  79  --  90  --  >90 89   < > >90   PRAANV 8.5* 8.6*  --  9.0  --  9.0  --  8.1* 8.7*   < > 8.5*   MAG 2.2  --   --  2.3  --   --   --  2.1 1.9  --  1.8   PHOS  --   --   --  3.1  --   --   --  2.1* 2.8  --  2.6    < > = values in this interval not displayed.     Troponins:     INR  Recent Labs   Lab  10/27/23  1943 10/26/23  0059   INR 1.23* 1.10     Liver panel  Recent Labs   Lab 10/28/23  0550 10/27/23  2210 10/27/23  1944 10/27/23  1046   PROTTOTAL 6.5 6.6 6.6 6.5   ALBUMIN 3.5 3.5 3.5 3.6   BILITOTAL 0.7 0.7 0.7 0.6   ALKPHOS 83 86 85 84   * 331* 362* 431*   ALT 63 68 66 67       Imaging/procedure results:  Cardiac Catheterization  Unchanged angiogram with patient stent and good distal flow in all   arteries  Most likely evolving injury related VT   Amiodarone loaded 75 mg and continue on the floor loading and consult EP   for possible vest and or EP study  Cardiac Catheterization    1st Mrg lesion is 20% stenosed.    Prox LAD to Mid LAD lesion is 100% stenosed.    1st Diag-1 lesion is 80% stenosed.    1st Diag-2 lesion is 50% stenosed.    Dist LAD lesion is 100% stenosed.    RPDA lesion is 70% stenosed.    Dist RCA lesion is 40% stenosed.    Anterior STEMI  Two vessel obstructive CAD (100% pLAD occlusion, 70% rPDA stenosis, 80%   diagonal 1 stenosis)  PCI of pLAD to mLAD with BRENDA x 1 (Synergy 2.50x 28 mm) post dilated to   2.75 vessel  CVC placement in RIJ  LVEDP of 26 mmHg  Hemostasis of RRA with TR band   XR Chest Port 1 View  Narrative: Exam: XR CHEST PORT 1 VIEW, 10/27/2023 3:51 PM    Indication: pulm congestion    Comparison: 10/26/2023    Findings:   The cardiomediastinal silhouette is within normal limits. The  pulmonary vasculature is mildly indistinct with mild bilateral  interstitial prominence. No appreciable pleural effusion or  pneumothorax. Patchy bibasilar opacities, right vertebral left.  Impression: Impression:   1. Findings suggestive of mild pulmonary edema.  2. New patchy bibasilar opacity, atelectasis versus infection.    PIERCE FERNANDEZ DO         SYSTEM ID:  [id number]

## 2023-10-28 NOTE — PROGRESS NOTES
Notified NP at 1320 PM regarding  pt has been hypotensive with maps of 65 byt SBP downtrending and currently in the 70's. Pt is asymptomatic. .      Spoke with: Shahab Solorzano NP    Orders were obtained. Lactic and CMP ordered    Comments: pt continuing to be asymptomatic.

## 2023-10-29 ENCOUNTER — APPOINTMENT (OUTPATIENT)
Dept: GENERAL RADIOLOGY | Facility: CLINIC | Age: 73
DRG: 321 | End: 2023-10-29
Attending: NURSE PRACTITIONER
Payer: MEDICARE

## 2023-10-29 ENCOUNTER — APPOINTMENT (OUTPATIENT)
Dept: OCCUPATIONAL THERAPY | Facility: CLINIC | Age: 73
DRG: 321 | End: 2023-10-29
Payer: MEDICARE

## 2023-10-29 LAB
ALBUMIN SERPL BCG-MCNC: 3.5 G/DL (ref 3.5–5.2)
ALBUMIN SERPL BCG-MCNC: 3.6 G/DL (ref 3.5–5.2)
ALP SERPL-CCNC: 73 U/L (ref 40–129)
ALP SERPL-CCNC: 80 U/L (ref 40–129)
ALT SERPL W P-5'-P-CCNC: 54 U/L (ref 0–70)
ALT SERPL W P-5'-P-CCNC: 57 U/L (ref 0–70)
ANION GAP SERPL CALCULATED.3IONS-SCNC: 12 MMOL/L (ref 7–15)
ANION GAP SERPL CALCULATED.3IONS-SCNC: 14 MMOL/L (ref 7–15)
AST SERPL W P-5'-P-CCNC: 123 U/L (ref 0–45)
AST SERPL W P-5'-P-CCNC: 154 U/L (ref 0–45)
BACTERIA SPT CULT: NORMAL
BILIRUB SERPL-MCNC: 0.7 MG/DL
BILIRUB SERPL-MCNC: 0.8 MG/DL
BUN SERPL-MCNC: 31.4 MG/DL (ref 8–23)
BUN SERPL-MCNC: 33.6 MG/DL (ref 8–23)
CALCIUM SERPL-MCNC: 8.7 MG/DL (ref 8.8–10.2)
CALCIUM SERPL-MCNC: 8.9 MG/DL (ref 8.8–10.2)
CHLORIDE SERPL-SCNC: 104 MMOL/L (ref 98–107)
CHLORIDE SERPL-SCNC: 105 MMOL/L (ref 98–107)
CREAT SERPL-MCNC: 1.05 MG/DL (ref 0.67–1.17)
CREAT SERPL-MCNC: 1.12 MG/DL (ref 0.67–1.17)
DEPRECATED HCO3 PLAS-SCNC: 21 MMOL/L (ref 22–29)
DEPRECATED HCO3 PLAS-SCNC: 25 MMOL/L (ref 22–29)
EGFRCR SERPLBLD CKD-EPI 2021: 69 ML/MIN/1.73M2
EGFRCR SERPLBLD CKD-EPI 2021: 75 ML/MIN/1.73M2
ERYTHROCYTE [DISTWIDTH] IN BLOOD BY AUTOMATED COUNT: 13.2 % (ref 10–15)
GLUCOSE SERPL-MCNC: 115 MG/DL (ref 70–99)
GLUCOSE SERPL-MCNC: 154 MG/DL (ref 70–99)
GRAM STAIN RESULT: NORMAL
HCT VFR BLD AUTO: 34.1 % (ref 40–53)
HGB BLD-MCNC: 11.3 G/DL (ref 13.3–17.7)
LACTATE SERPL-SCNC: 1.3 MMOL/L (ref 0.7–2)
LACTATE SERPL-SCNC: 2.7 MMOL/L (ref 0.7–2)
LACTATE SERPL-SCNC: 2.9 MMOL/L (ref 0.7–2)
LACTATE SERPL-SCNC: 2.9 MMOL/L (ref 0.7–2)
MAGNESIUM SERPL-MCNC: 2.1 MG/DL (ref 1.7–2.3)
MCH RBC QN AUTO: 32.1 PG (ref 26.5–33)
MCHC RBC AUTO-ENTMCNC: 33.1 G/DL (ref 31.5–36.5)
MCV RBC AUTO: 97 FL (ref 78–100)
PHOSPHATE SERPL-MCNC: 4.5 MG/DL (ref 2.5–4.5)
PLATELET # BLD AUTO: 236 10E3/UL (ref 150–450)
POTASSIUM SERPL-SCNC: 3.9 MMOL/L (ref 3.4–5.3)
POTASSIUM SERPL-SCNC: 3.9 MMOL/L (ref 3.4–5.3)
POTASSIUM SERPL-SCNC: 4.3 MMOL/L (ref 3.4–5.3)
PROT SERPL-MCNC: 6.7 G/DL (ref 6.4–8.3)
PROT SERPL-MCNC: 6.9 G/DL (ref 6.4–8.3)
RBC # BLD AUTO: 3.52 10E6/UL (ref 4.4–5.9)
SODIUM SERPL-SCNC: 139 MMOL/L (ref 135–145)
SODIUM SERPL-SCNC: 142 MMOL/L (ref 135–145)
WBC # BLD AUTO: 16.5 10E3/UL (ref 4–11)

## 2023-10-29 PROCEDURE — 97530 THERAPEUTIC ACTIVITIES: CPT | Mod: GO

## 2023-10-29 PROCEDURE — 250N000013 HC RX MED GY IP 250 OP 250 PS 637

## 2023-10-29 PROCEDURE — 250N000013 HC RX MED GY IP 250 OP 250 PS 637: Performed by: NURSE PRACTITIONER

## 2023-10-29 PROCEDURE — 84999 UNLISTED CHEMISTRY PROCEDURE: CPT

## 2023-10-29 PROCEDURE — 258N000003 HC RX IP 258 OP 636: Performed by: STUDENT IN AN ORGANIZED HEALTH CARE EDUCATION/TRAINING PROGRAM

## 2023-10-29 PROCEDURE — 80053 COMPREHEN METABOLIC PANEL: CPT | Performed by: STUDENT IN AN ORGANIZED HEALTH CARE EDUCATION/TRAINING PROGRAM

## 2023-10-29 PROCEDURE — 999N000128 HC STATISTIC PERIPHERAL IV START W/O US GUIDANCE

## 2023-10-29 PROCEDURE — 250N000011 HC RX IP 250 OP 636: Mod: JZ | Performed by: NURSE PRACTITIONER

## 2023-10-29 PROCEDURE — 93005 ELECTROCARDIOGRAM TRACING: CPT

## 2023-10-29 PROCEDURE — 36415 COLL VENOUS BLD VENIPUNCTURE: CPT | Performed by: STUDENT IN AN ORGANIZED HEALTH CARE EDUCATION/TRAINING PROGRAM

## 2023-10-29 PROCEDURE — 999N000157 HC STATISTIC RCP TIME EA 10 MIN

## 2023-10-29 PROCEDURE — 71045 X-RAY EXAM CHEST 1 VIEW: CPT | Mod: 26 | Performed by: STUDENT IN AN ORGANIZED HEALTH CARE EDUCATION/TRAINING PROGRAM

## 2023-10-29 PROCEDURE — 94799 UNLISTED PULMONARY SVC/PX: CPT

## 2023-10-29 PROCEDURE — 83735 ASSAY OF MAGNESIUM: CPT | Performed by: INTERNAL MEDICINE

## 2023-10-29 PROCEDURE — 83605 ASSAY OF LACTIC ACID: CPT | Performed by: STUDENT IN AN ORGANIZED HEALTH CARE EDUCATION/TRAINING PROGRAM

## 2023-10-29 PROCEDURE — 80176 ASSAY OF LIDOCAINE: CPT

## 2023-10-29 PROCEDURE — 250N000013 HC RX MED GY IP 250 OP 250 PS 637: Performed by: STUDENT IN AN ORGANIZED HEALTH CARE EDUCATION/TRAINING PROGRAM

## 2023-10-29 PROCEDURE — 250N000013 HC RX MED GY IP 250 OP 250 PS 637: Performed by: INTERNAL MEDICINE

## 2023-10-29 PROCEDURE — 87205 SMEAR GRAM STAIN: CPT | Performed by: NURSE PRACTITIONER

## 2023-10-29 PROCEDURE — 200N000002 HC R&B ICU UMMC

## 2023-10-29 PROCEDURE — 85027 COMPLETE CBC AUTOMATED: CPT | Performed by: NURSE PRACTITIONER

## 2023-10-29 PROCEDURE — 36415 COLL VENOUS BLD VENIPUNCTURE: CPT

## 2023-10-29 PROCEDURE — 36415 COLL VENOUS BLD VENIPUNCTURE: CPT | Performed by: INTERNAL MEDICINE

## 2023-10-29 PROCEDURE — 84100 ASSAY OF PHOSPHORUS: CPT | Performed by: INTERNAL MEDICINE

## 2023-10-29 PROCEDURE — 84155 ASSAY OF PROTEIN SERUM: CPT | Performed by: STUDENT IN AN ORGANIZED HEALTH CARE EDUCATION/TRAINING PROGRAM

## 2023-10-29 PROCEDURE — 71045 X-RAY EXAM CHEST 1 VIEW: CPT

## 2023-10-29 PROCEDURE — 99291 CRITICAL CARE FIRST HOUR: CPT | Performed by: INTERNAL MEDICINE

## 2023-10-29 PROCEDURE — 36415 COLL VENOUS BLD VENIPUNCTURE: CPT | Performed by: NURSE PRACTITIONER

## 2023-10-29 PROCEDURE — 84132 ASSAY OF SERUM POTASSIUM: CPT | Performed by: INTERNAL MEDICINE

## 2023-10-29 PROCEDURE — 250N000011 HC RX IP 250 OP 636: Performed by: STUDENT IN AN ORGANIZED HEALTH CARE EDUCATION/TRAINING PROGRAM

## 2023-10-29 RX ORDER — FUROSEMIDE 10 MG/ML
60 INJECTION INTRAMUSCULAR; INTRAVENOUS
Status: DISCONTINUED | OUTPATIENT
Start: 2023-10-29 | End: 2023-10-30

## 2023-10-29 RX ORDER — POTASSIUM CHLORIDE 750 MG/1
40 TABLET, EXTENDED RELEASE ORAL ONCE
Status: COMPLETED | OUTPATIENT
Start: 2023-10-29 | End: 2023-10-29

## 2023-10-29 RX ORDER — POTASSIUM CHLORIDE 750 MG/1
20 TABLET, EXTENDED RELEASE ORAL ONCE
Status: COMPLETED | OUTPATIENT
Start: 2023-10-29 | End: 2023-10-29

## 2023-10-29 RX ORDER — FUROSEMIDE 10 MG/ML
60 INJECTION INTRAMUSCULAR; INTRAVENOUS ONCE
Status: COMPLETED | OUTPATIENT
Start: 2023-10-29 | End: 2023-10-29

## 2023-10-29 RX ORDER — BISACODYL 10 MG
10 SUPPOSITORY, RECTAL RECTAL DAILY PRN
Status: DISCONTINUED | OUTPATIENT
Start: 2023-10-29 | End: 2023-10-30

## 2023-10-29 RX ORDER — MIDODRINE HYDROCHLORIDE 5 MG/1
5 TABLET ORAL EVERY 8 HOURS SCHEDULED
Status: DISCONTINUED | OUTPATIENT
Start: 2023-10-29 | End: 2023-10-30

## 2023-10-29 RX ADMIN — ATORVASTATIN CALCIUM 80 MG: 80 TABLET, FILM COATED ORAL at 08:12

## 2023-10-29 RX ADMIN — TICAGRELOR 90 MG: 90 TABLET ORAL at 08:33

## 2023-10-29 RX ADMIN — SENNOSIDES AND DOCUSATE SODIUM 2 TABLET: 8.6; 5 TABLET ORAL at 08:14

## 2023-10-29 RX ADMIN — Medication 5 MG: at 20:21

## 2023-10-29 RX ADMIN — POLYETHYLENE GLYCOL 3350 17 G: 17 POWDER, FOR SOLUTION ORAL at 08:15

## 2023-10-29 RX ADMIN — POTASSIUM CHLORIDE 40 MEQ: 750 TABLET, EXTENDED RELEASE ORAL at 08:32

## 2023-10-29 RX ADMIN — HYDROXYZINE HYDROCHLORIDE 25 MG: 25 TABLET ORAL at 08:15

## 2023-10-29 RX ADMIN — ACETAMINOPHEN 650 MG: 325 TABLET, FILM COATED ORAL at 20:08

## 2023-10-29 RX ADMIN — ACETAMINOPHEN 650 MG: 325 TABLET, FILM COATED ORAL at 08:13

## 2023-10-29 RX ADMIN — FUROSEMIDE 60 MG: 10 INJECTION, SOLUTION INTRAMUSCULAR; INTRAVENOUS at 09:13

## 2023-10-29 RX ADMIN — MIDODRINE HYDROCHLORIDE 5 MG: 5 TABLET ORAL at 15:12

## 2023-10-29 RX ADMIN — FUROSEMIDE 60 MG: 10 INJECTION, SOLUTION INTRAMUSCULAR; INTRAVENOUS at 15:14

## 2023-10-29 RX ADMIN — MIDODRINE HYDROCHLORIDE 10 MG: 5 TABLET ORAL at 05:42

## 2023-10-29 RX ADMIN — APIXABAN 5 MG: 5 TABLET, FILM COATED ORAL at 08:12

## 2023-10-29 RX ADMIN — SENNOSIDES AND DOCUSATE SODIUM 2 TABLET: 8.6; 5 TABLET ORAL at 20:20

## 2023-10-29 RX ADMIN — HYDROXYZINE HYDROCHLORIDE 50 MG: 25 TABLET ORAL at 23:26

## 2023-10-29 RX ADMIN — APIXABAN 5 MG: 5 TABLET, FILM COATED ORAL at 20:09

## 2023-10-29 RX ADMIN — POTASSIUM CHLORIDE 20 MEQ: 750 TABLET, EXTENDED RELEASE ORAL at 15:12

## 2023-10-29 RX ADMIN — MIDODRINE HYDROCHLORIDE 5 MG: 5 TABLET ORAL at 21:37

## 2023-10-29 RX ADMIN — AMIODARONE HYDROCHLORIDE 1 MG/MIN: 50 INJECTION, SOLUTION INTRAVENOUS at 05:34

## 2023-10-29 RX ADMIN — TICAGRELOR 90 MG: 90 TABLET ORAL at 20:09

## 2023-10-29 RX ADMIN — HYDROXYZINE HYDROCHLORIDE 25 MG: 25 TABLET ORAL at 16:46

## 2023-10-29 ASSESSMENT — ACTIVITIES OF DAILY LIVING (ADL)
ADLS_ACUITY_SCORE: 30

## 2023-10-29 NOTE — PROGRESS NOTES
Major Shift Events:  pt having some hypotension earlier in the shift that has since resolved after midodrine. Labs rechecked and were consistent/improving. Pt also having significant anxiety. Writer encouraged pt to talk through his feelings and worked on utilizing the TV for distraction which has been helpful.    Plan:  continue to monitor anxiety and rhythm  For vital signs and complete assessments, please see documentation flowsheets.

## 2023-10-29 NOTE — PLAN OF CARE
Neuro: Intact. A+Ox4. Generalized pain-robaxin, oxy given x 1. Intermittent anxiety/panic attacks- refused atarax. Robaxin and oxy for generalized pain   CV Afebrile. MAP>65. Amio at 1  Pulm: LS clear, dim. Oxymask at 4LNC- pt refused NC  GI: BS active. Poor appetite   : Voided x2 tonight = 225cc- urinal at bedside   Skin:L rad intevention site. R groin access site, Abdominal bruising  Drains: None   Drips: Amio @1.   Labs: Lactate 2.9 today     Goal Outcome Evaluation:      Plan of Care Reviewed With: patient    Overall Patient Progress: improvingOverall Patient Progress: improving    Outcome Evaluation: Intermittently increased axiety and panic attacks. Amio gtt

## 2023-10-29 NOTE — PROGRESS NOTES
Cardiology ICU Progress Note    Brief HPI:  Mr Duane C Johnson is a 73 year old gentleman with a history of HTN, HLD, and tobacco use presented to ED 10/26 with acute chest pain and EKG demonstrating anterior STEMI for which he underwent coronary angigoram and had PCI with BRENDA to pLAD. His course is complicated by subsequent VF/VT cardiac arrest x2 on 10/27/23 for which repeat angio performed revealing patent stents He is now transferred to the CICU.    Subjective and Interval:  - NAEO.WBC up this am without fever. Inteval events include lasix given yesterday, net + 260 ml yest, net +200 ml since midnight  - Lido stopped yesterday  - No further episodes of sustained VT    Changes today:  Decrease Amio to 0.5  lasix 60 mg IV once now  Additional potassium x 1 given 3.9 pre-diuresis  CXR for new baseline post repeat arrest and occasional SOB/WBC elevation  Decrease Midodrine  Cardiac rehab  Transfer to floor  Ramp up bowel regimen  Limited echo to reassess LV function    Assessment and plan by system:    ===Neuro===  #Pain/Agitation/Sedation  Acetaminophen and Oxycodone PRN     ===Cardiovascular===  #Anterior STEMI s/p PCI  #VT/VF arrest on 10/28  #Coronary Artery Disease  #Cardiomyopathy with reduced EF 30-35%  #HTN  #HLD  - Decrease amio gtt to 0.5  - Lido stopped 10/28  - Continue Ticagrelor plus Apixa   - Continue home Atorvastatin  - Continue metoprolol 12.5 mg BID  - Cardiac Rehab referral  - Telemetry monitoring      ===Pulmonary===  No current concerns         ===Renal===  -JIE        ===Gastroenterology===  #Nutrition  Heart Healthy Diet        ===Infectious Disease===  #Leukocytosis  Likely reactive in s/o STEMI. Trend and monitor for infectious symptoms.      ===Hematology===  #Leukocytosis  As above.      FEN: HHD  Analgesia: Oxy PRN  Sedation: None  Anticoagulants: None  Ulcer PPX: None  Glucose: Consider sliding scale insulin   SBT/SAT: NA  Bowel Regimen: PRN    Patient seen and discussed with staff  "physician.    Objective:  Most recent vital signs:  /82   Pulse 71   Temp 97.8  F (36.6  C) (Oral)   Resp 17   Ht 1.676 m (5' 5.98\")   Wt 65.8 kg (145 lb 1 oz)   SpO2 98%   BMI 23.43 kg/m    Temp:  [97.8  F (36.6  C)-98.3  F (36.8  C)] 97.8  F (36.6  C)  Pulse:  [53-78] 71  Resp:  [14-27] 17  BP: ()/(46-83) 119/82  SpO2:  [91 %-100 %] 98 %  Wt Readings from Last 2 Encounters:   10/29/23 65.8 kg (145 lb 1 oz)   10/09/23 67.1 kg (148 lb)       Intake/Output Summary (Last 24 hours) at 10/26/2023 0952  Last data filed at 10/26/2023 0900  Gross per 24 hour   Intake 798.59 ml   Output --   Net 798.59 ml     Physical exam:  General: In bed, in NAD  HEENT: PERRL, no scleral icterus or injection  CARDIAC: RRR, no m/r/g appreciated. Peripheral pulses dopplered  RESP: CTAB, no wheezes, rhonchi or crackles appreciated.  GI: soft, BS hypoactive  : Michael  EXTREMITIES: NO LE edema, pulses 2+. Femoral access site w/o bleeding, dressing c/d/i.   SKIN: No acute lesions appreciated  NEURO: Alert and interactive    Labs (Past three days):  CBC  Recent Labs   Lab 10/29/23  0623 10/28/23  0550 10/27/23  1943 10/27/23  0418   WBC 16.5* 12.8* 14.4* 12.2*   RBC 3.52* 3.73* 3.61* 3.36*   HGB 11.3* 12.0* 11.9* 11.0*   HCT 34.1* 35.6* 33.6* 31.4*   MCV 97 95 93 94   MCH 32.1 32.2 33.0 32.7   MCHC 33.1 33.7 35.4 35.0   RDW 13.2 13.2 13.1 13.0    213 274 247     BMP  Recent Labs   Lab 10/29/23  0348 10/28/23  1401 10/28/23  0550 10/27/23  2210 10/27/23  2209 10/27/23  1944 10/27/23  0828 10/27/23  0418 10/26/23  2015    137 138 137  --  136   < > 138 135   POTASSIUM 3.9 4.3 4.1 4.4  --  4.1   < > 4.0 4.5   CHLORIDE 104 103 104 102  --  100   < > 106 103   CO2 21* 23 23 24  --  24   < > 23 23   ANIONGAP 14 11 11 11  --  12   < > 9 9   * 108* 91 109*   < > 120*   < > 100* 161*   BUN 31.4* 27.2* 22.9 18.8  --  18.3   < > 15.8 11.2   CR 1.05 1.14 0.95 0.93  --  1.00   < > 0.79 0.91   GFRESTIMATED 75 68 85 87  " --  79   < > >90 89   PRANAV 8.7* 8.5* 8.5* 8.6*  --  9.0   < > 8.1* 8.7*   MAG 2.1  --  2.2  --   --  2.3  --  2.1 1.9   PHOS 4.5  --   --   --   --  3.1  --  2.1* 2.8    < > = values in this interval not displayed.     Troponins:     INR  Recent Labs   Lab 10/27/23  1943 10/26/23  0059   INR 1.23* 1.10     Liver panel  Recent Labs   Lab 10/29/23  0348 10/28/23  1401 10/28/23  0550 10/27/23  2210   PROTTOTAL 6.9 6.2* 6.5 6.6   ALBUMIN 3.6 3.3* 3.5 3.5   BILITOTAL 0.7 0.9 0.7 0.7   ALKPHOS 80 75 83 86   * 203* 261* 331*   ALT 57 53 63 68       Imaging/procedure results:  Cardiac Catheterization  Unchanged angiogram with patient stent and good distal flow in all   arteries  Most likely evolving injury related VT   Amiodarone loaded 75 mg and continue on the floor loading and consult EP   for possible vest and or EP study  Cardiac Catheterization    1st Mrg lesion is 20% stenosed.    Prox LAD to Mid LAD lesion is 100% stenosed.    1st Diag-1 lesion is 80% stenosed.    1st Diag-2 lesion is 50% stenosed.    Dist LAD lesion is 100% stenosed.    RPDA lesion is 70% stenosed.    Dist RCA lesion is 40% stenosed.    Anterior STEMI  Two vessel obstructive CAD (100% pLAD occlusion, 70% rPDA stenosis, 80%   diagonal 1 stenosis)  PCI of pLAD to mLAD with BRENDA x 1 (Synergy 2.50x 28 mm) post dilated to   2.75 vessel  CVC placement in RIJ  LVEDP of 26 mmHg  Hemostasis of RRA with TR band   XR Chest Port 1 View  Narrative: Exam: XR CHEST PORT 1 VIEW, 10/27/2023 3:51 PM    Indication: pulm congestion    Comparison: 10/26/2023    Findings:   The cardiomediastinal silhouette is within normal limits. The  pulmonary vasculature is mildly indistinct with mild bilateral  interstitial prominence. No appreciable pleural effusion or  pneumothorax. Patchy bibasilar opacities, right vertebral left.  Impression: Impression:   1. Findings suggestive of mild pulmonary edema.  2. New patchy bibasilar opacity, atelectasis versus  infection.    PIERCE FERNANDEZ DO         SYSTEM ID:  [id number]

## 2023-10-30 ENCOUNTER — APPOINTMENT (OUTPATIENT)
Dept: CARDIOLOGY | Facility: CLINIC | Age: 73
DRG: 321 | End: 2023-10-30
Attending: STUDENT IN AN ORGANIZED HEALTH CARE EDUCATION/TRAINING PROGRAM
Payer: MEDICARE

## 2023-10-30 ENCOUNTER — APPOINTMENT (OUTPATIENT)
Dept: GENERAL RADIOLOGY | Facility: CLINIC | Age: 73
DRG: 321 | End: 2023-10-30
Attending: NURSE PRACTITIONER
Payer: MEDICARE

## 2023-10-30 LAB
ALBUMIN SERPL BCG-MCNC: 3.2 G/DL (ref 3.5–5.2)
ALBUMIN SERPL BCG-MCNC: 3.4 G/DL (ref 3.5–5.2)
ALP SERPL-CCNC: 71 U/L (ref 40–129)
ALP SERPL-CCNC: 78 U/L (ref 40–129)
ALT SERPL W P-5'-P-CCNC: 42 U/L (ref 0–70)
ALT SERPL W P-5'-P-CCNC: 45 U/L (ref 0–70)
ANION GAP SERPL CALCULATED.3IONS-SCNC: 11 MMOL/L (ref 7–15)
ANION GAP SERPL CALCULATED.3IONS-SCNC: 15 MMOL/L (ref 7–15)
AST SERPL W P-5'-P-CCNC: 81 U/L (ref 0–45)
AST SERPL W P-5'-P-CCNC: 89 U/L (ref 0–45)
ATRIAL RATE - MUSE: 66 BPM
ATRIAL RATE - MUSE: 85 BPM
BACTERIA SPT CULT: NORMAL
BI-PLANE LVEF ECHO: NORMAL
BILIRUB SERPL-MCNC: 0.8 MG/DL
BILIRUB SERPL-MCNC: 0.8 MG/DL
BUN SERPL-MCNC: 32.5 MG/DL (ref 8–23)
BUN SERPL-MCNC: 34.7 MG/DL (ref 8–23)
CALCIUM SERPL-MCNC: 8.6 MG/DL (ref 8.8–10.2)
CALCIUM SERPL-MCNC: 8.9 MG/DL (ref 8.8–10.2)
CHLORIDE SERPL-SCNC: 103 MMOL/L (ref 98–107)
CHLORIDE SERPL-SCNC: 104 MMOL/L (ref 98–107)
CREAT SERPL-MCNC: 0.98 MG/DL (ref 0.67–1.17)
CREAT SERPL-MCNC: 1.08 MG/DL (ref 0.67–1.17)
DEPRECATED HCO3 PLAS-SCNC: 23 MMOL/L (ref 22–29)
DEPRECATED HCO3 PLAS-SCNC: 24 MMOL/L (ref 22–29)
DIASTOLIC BLOOD PRESSURE - MUSE: NORMAL MMHG
DIASTOLIC BLOOD PRESSURE - MUSE: NORMAL MMHG
EGFRCR SERPLBLD CKD-EPI 2021: 72 ML/MIN/1.73M2
EGFRCR SERPLBLD CKD-EPI 2021: 81 ML/MIN/1.73M2
ERYTHROCYTE [DISTWIDTH] IN BLOOD BY AUTOMATED COUNT: 13.2 % (ref 10–15)
GLUCOSE BLDC GLUCOMTR-MCNC: 110 MG/DL (ref 70–99)
GLUCOSE SERPL-MCNC: 112 MG/DL (ref 70–99)
GLUCOSE SERPL-MCNC: 114 MG/DL (ref 70–99)
GRAM STAIN RESULT: NORMAL
HCT VFR BLD AUTO: 33.2 % (ref 40–53)
HGB BLD-MCNC: 11.3 G/DL (ref 13.3–17.7)
INTERPRETATION ECG - MUSE: NORMAL
INTERPRETATION ECG - MUSE: NORMAL
LACTATE SERPL-SCNC: 1.2 MMOL/L (ref 0.7–2)
LACTATE SERPL-SCNC: 1.4 MMOL/L (ref 0.7–2)
LACTATE SERPL-SCNC: 1.6 MMOL/L (ref 0.7–2)
LACTATE SERPL-SCNC: 2 MMOL/L (ref 0.7–2)
LVEF ECHO: NORMAL
MAGNESIUM SERPL-MCNC: 1.9 MG/DL (ref 1.7–2.3)
MCH RBC QN AUTO: 32.7 PG (ref 26.5–33)
MCHC RBC AUTO-ENTMCNC: 34 G/DL (ref 31.5–36.5)
MCV RBC AUTO: 96 FL (ref 78–100)
P AXIS - MUSE: 48 DEGREES
P AXIS - MUSE: 50 DEGREES
PHOSPHATE SERPL-MCNC: 1.7 MG/DL (ref 2.5–4.5)
PLATELET # BLD AUTO: 263 10E3/UL (ref 150–450)
POTASSIUM SERPL-SCNC: 3.8 MMOL/L (ref 3.4–5.3)
POTASSIUM SERPL-SCNC: 3.8 MMOL/L (ref 3.4–5.3)
PR INTERVAL - MUSE: 196 MS
PR INTERVAL - MUSE: 234 MS
PROCALCITONIN SERPL IA-MCNC: 0.12 NG/ML
PROT SERPL-MCNC: 6.3 G/DL (ref 6.4–8.3)
PROT SERPL-MCNC: 6.9 G/DL (ref 6.4–8.3)
QRS DURATION - MUSE: 82 MS
QRS DURATION - MUSE: 92 MS
QT - MUSE: 400 MS
QT - MUSE: 422 MS
QTC - MUSE: 442 MS
QTC - MUSE: 476 MS
R AXIS - MUSE: -11 DEGREES
R AXIS - MUSE: 8 DEGREES
RBC # BLD AUTO: 3.46 10E6/UL (ref 4.4–5.9)
SCANNED LAB RESULT: ABNORMAL
SODIUM SERPL-SCNC: 139 MMOL/L (ref 135–145)
SODIUM SERPL-SCNC: 141 MMOL/L (ref 135–145)
SYSTOLIC BLOOD PRESSURE - MUSE: NORMAL MMHG
SYSTOLIC BLOOD PRESSURE - MUSE: NORMAL MMHG
T AXIS - MUSE: 55 DEGREES
T AXIS - MUSE: 97 DEGREES
VENTRICULAR RATE- MUSE: 66 BPM
VENTRICULAR RATE- MUSE: 85 BPM
WBC # BLD AUTO: 14 10E3/UL (ref 4–11)

## 2023-10-30 PROCEDURE — 83735 ASSAY OF MAGNESIUM: CPT | Performed by: INTERNAL MEDICINE

## 2023-10-30 PROCEDURE — 250N000013 HC RX MED GY IP 250 OP 250 PS 637: Performed by: NURSE PRACTITIONER

## 2023-10-30 PROCEDURE — 71045 X-RAY EXAM CHEST 1 VIEW: CPT

## 2023-10-30 PROCEDURE — 84450 TRANSFERASE (AST) (SGOT): CPT | Performed by: STUDENT IN AN ORGANIZED HEALTH CARE EDUCATION/TRAINING PROGRAM

## 2023-10-30 PROCEDURE — 84100 ASSAY OF PHOSPHORUS: CPT | Performed by: INTERNAL MEDICINE

## 2023-10-30 PROCEDURE — 83605 ASSAY OF LACTIC ACID: CPT | Performed by: STUDENT IN AN ORGANIZED HEALTH CARE EDUCATION/TRAINING PROGRAM

## 2023-10-30 PROCEDURE — 85027 COMPLETE CBC AUTOMATED: CPT | Performed by: NURSE PRACTITIONER

## 2023-10-30 PROCEDURE — 255N000002 HC RX 255 OP 636: Performed by: INTERNAL MEDICINE

## 2023-10-30 PROCEDURE — 93308 TTE F-UP OR LMTD: CPT | Mod: 26 | Performed by: INTERNAL MEDICINE

## 2023-10-30 PROCEDURE — 999N000208 ECHOCARDIOGRAM LIMITED

## 2023-10-30 PROCEDURE — 93325 DOPPLER ECHO COLOR FLOW MAPG: CPT | Mod: 26 | Performed by: INTERNAL MEDICINE

## 2023-10-30 PROCEDURE — 250N000011 HC RX IP 250 OP 636: Mod: JZ | Performed by: NURSE PRACTITIONER

## 2023-10-30 PROCEDURE — 250N000013 HC RX MED GY IP 250 OP 250 PS 637: Performed by: STUDENT IN AN ORGANIZED HEALTH CARE EDUCATION/TRAINING PROGRAM

## 2023-10-30 PROCEDURE — 71045 X-RAY EXAM CHEST 1 VIEW: CPT | Mod: 26 | Performed by: RADIOLOGY

## 2023-10-30 PROCEDURE — 84145 PROCALCITONIN (PCT): CPT | Performed by: NURSE PRACTITIONER

## 2023-10-30 PROCEDURE — 99233 SBSQ HOSP IP/OBS HIGH 50: CPT | Mod: 25 | Performed by: NURSE PRACTITIONER

## 2023-10-30 PROCEDURE — 250N000013 HC RX MED GY IP 250 OP 250 PS 637

## 2023-10-30 PROCEDURE — 80053 COMPREHEN METABOLIC PANEL: CPT | Performed by: STUDENT IN AN ORGANIZED HEALTH CARE EDUCATION/TRAINING PROGRAM

## 2023-10-30 PROCEDURE — 250N000013 HC RX MED GY IP 250 OP 250 PS 637: Performed by: INTERNAL MEDICINE

## 2023-10-30 PROCEDURE — 93321 DOPPLER ECHO F-UP/LMTD STD: CPT | Mod: 26 | Performed by: INTERNAL MEDICINE

## 2023-10-30 PROCEDURE — 999N000157 HC STATISTIC RCP TIME EA 10 MIN

## 2023-10-30 PROCEDURE — 87205 SMEAR GRAM STAIN: CPT | Performed by: INTERNAL MEDICINE

## 2023-10-30 PROCEDURE — 200N000002 HC R&B ICU UMMC

## 2023-10-30 PROCEDURE — 84155 ASSAY OF PROTEIN SERUM: CPT | Performed by: STUDENT IN AN ORGANIZED HEALTH CARE EDUCATION/TRAINING PROGRAM

## 2023-10-30 PROCEDURE — 36415 COLL VENOUS BLD VENIPUNCTURE: CPT | Performed by: STUDENT IN AN ORGANIZED HEALTH CARE EDUCATION/TRAINING PROGRAM

## 2023-10-30 PROCEDURE — 99222 1ST HOSP IP/OBS MODERATE 55: CPT | Performed by: PSYCHIATRY & NEUROLOGY

## 2023-10-30 RX ORDER — FUROSEMIDE 10 MG/ML
40 INJECTION INTRAMUSCULAR; INTRAVENOUS
Status: COMPLETED | OUTPATIENT
Start: 2023-10-30 | End: 2023-10-30

## 2023-10-30 RX ORDER — POLYETHYLENE GLYCOL 3350 17 G/17G
17 POWDER, FOR SOLUTION ORAL 2 TIMES DAILY
Status: DISCONTINUED | OUTPATIENT
Start: 2023-10-30 | End: 2023-11-01

## 2023-10-30 RX ORDER — IPRATROPIUM BROMIDE AND ALBUTEROL SULFATE 2.5; .5 MG/3ML; MG/3ML
3 SOLUTION RESPIRATORY (INHALATION) 4 TIMES DAILY PRN
Status: DISCONTINUED | OUTPATIENT
Start: 2023-10-30 | End: 2023-11-03 | Stop reason: HOSPADM

## 2023-10-30 RX ORDER — BISACODYL 10 MG
10 SUPPOSITORY, RECTAL RECTAL DAILY
Status: DISCONTINUED | OUTPATIENT
Start: 2023-10-30 | End: 2023-11-01

## 2023-10-30 RX ORDER — MAGNESIUM OXIDE 400 MG/1
400 TABLET ORAL EVERY 4 HOURS
Status: COMPLETED | OUTPATIENT
Start: 2023-10-30 | End: 2023-10-30

## 2023-10-30 RX ORDER — POTASSIUM CHLORIDE 750 MG/1
20 TABLET, EXTENDED RELEASE ORAL ONCE
Status: COMPLETED | OUTPATIENT
Start: 2023-10-30 | End: 2023-10-30

## 2023-10-30 RX ORDER — IPRATROPIUM BROMIDE AND ALBUTEROL SULFATE 2.5; .5 MG/3ML; MG/3ML
3 SOLUTION RESPIRATORY (INHALATION)
Status: DISCONTINUED | OUTPATIENT
Start: 2023-10-30 | End: 2023-10-30

## 2023-10-30 RX ORDER — POTASSIUM CHLORIDE 750 MG/1
40 TABLET, EXTENDED RELEASE ORAL ONCE
Status: COMPLETED | OUTPATIENT
Start: 2023-10-30 | End: 2023-10-30

## 2023-10-30 RX ORDER — CEFTRIAXONE 1 G/1
1 INJECTION, POWDER, FOR SOLUTION INTRAMUSCULAR; INTRAVENOUS EVERY 24 HOURS
Status: COMPLETED | OUTPATIENT
Start: 2023-10-30 | End: 2023-11-03

## 2023-10-30 RX ORDER — AMIODARONE HYDROCHLORIDE 200 MG/1
400 TABLET ORAL 2 TIMES DAILY
Status: DISCONTINUED | OUTPATIENT
Start: 2023-10-30 | End: 2023-11-01

## 2023-10-30 RX ADMIN — SENNOSIDES AND DOCUSATE SODIUM 2 TABLET: 8.6; 5 TABLET ORAL at 09:13

## 2023-10-30 RX ADMIN — POLYETHYLENE GLYCOL 3350 17 G: 17 POWDER, FOR SOLUTION ORAL at 20:00

## 2023-10-30 RX ADMIN — FUROSEMIDE 40 MG: 10 INJECTION, SOLUTION INTRAVENOUS at 15:42

## 2023-10-30 RX ADMIN — HYDROXYZINE HYDROCHLORIDE 50 MG: 25 TABLET ORAL at 22:13

## 2023-10-30 RX ADMIN — POLYETHYLENE GLYCOL 3350 17 G: 17 POWDER, FOR SOLUTION ORAL at 09:12

## 2023-10-30 RX ADMIN — BISACODYL 10 MG: 10 SUPPOSITORY RECTAL at 15:52

## 2023-10-30 RX ADMIN — APIXABAN 5 MG: 5 TABLET, FILM COATED ORAL at 09:13

## 2023-10-30 RX ADMIN — AMIODARONE HYDROCHLORIDE 400 MG: 200 TABLET ORAL at 09:13

## 2023-10-30 RX ADMIN — POTASSIUM CHLORIDE 40 MEQ: 750 TABLET, EXTENDED RELEASE ORAL at 09:13

## 2023-10-30 RX ADMIN — MAGNESIUM OXIDE TAB 400 MG (241.3 MG ELEMENTAL MG) 400 MG: 400 (241.3 MG) TAB at 05:47

## 2023-10-30 RX ADMIN — TICAGRELOR 90 MG: 90 TABLET ORAL at 09:13

## 2023-10-30 RX ADMIN — HUMAN ALBUMIN MICROSPHERES AND PERFLUTREN 5 ML: 10; .22 INJECTION, SOLUTION INTRAVENOUS at 07:47

## 2023-10-30 RX ADMIN — APIXABAN 5 MG: 5 TABLET, FILM COATED ORAL at 20:00

## 2023-10-30 RX ADMIN — MAGNESIUM OXIDE TAB 400 MG (241.3 MG ELEMENTAL MG) 400 MG: 400 (241.3 MG) TAB at 09:13

## 2023-10-30 RX ADMIN — POTASSIUM CHLORIDE 20 MEQ: 750 TABLET, EXTENDED RELEASE ORAL at 20:00

## 2023-10-30 RX ADMIN — CEFTRIAXONE SODIUM 1 G: 1 INJECTION, POWDER, FOR SOLUTION INTRAMUSCULAR; INTRAVENOUS at 10:16

## 2023-10-30 RX ADMIN — HYDROXYZINE HYDROCHLORIDE 25 MG: 25 TABLET ORAL at 12:53

## 2023-10-30 RX ADMIN — AMIODARONE HYDROCHLORIDE 400 MG: 200 TABLET ORAL at 20:00

## 2023-10-30 RX ADMIN — TICAGRELOR 90 MG: 90 TABLET ORAL at 20:00

## 2023-10-30 RX ADMIN — ATORVASTATIN CALCIUM 80 MG: 80 TABLET, FILM COATED ORAL at 09:14

## 2023-10-30 RX ADMIN — SENNOSIDES AND DOCUSATE SODIUM 2 TABLET: 8.6; 5 TABLET ORAL at 19:58

## 2023-10-30 RX ADMIN — MIDODRINE HYDROCHLORIDE 5 MG: 5 TABLET ORAL at 05:47

## 2023-10-30 RX ADMIN — Medication 5 MG: at 19:59

## 2023-10-30 ASSESSMENT — ACTIVITIES OF DAILY LIVING (ADL)
ADLS_ACUITY_SCORE: 30

## 2023-10-30 NOTE — PROGRESS NOTES
Major Shift Events:  SR. Very rare PVCs noted on tele. weaned to RA. Atarax given for anxiety which seems to be helping. CXR done and Sputum sample sent and declined by lab d/t oral decontaminants- will reorder. Afebrile. Lasix given x2 with good UOP. Pt has no appetite- will discuss with team tomorrow.    Plan: send another sputum sample. Monitor rhythm.  For vital signs and complete assessments, please see documentation flowsheets.

## 2023-10-30 NOTE — PROGRESS NOTES
Cardiology ICU Progress Note    Brief HPI:  Mr Duane C Johnson is a 73 year old gentleman with a history of HTN, HLD, and tobacco use presented to ED 10/26 with acute chest pain and EKG demonstrating anterior STEMI for which he underwent coronary angigoram and had PCI with BRENDA to pLAD. His course is complicated by subsequent VF/VT cardiac arrest x2 on 10/27/23 for which repeat angio performed revealing patent stents He is now transferred to the CICU.    Subjective and Interval:  - NAEO.WBC decreased 14 (16)afebrile. Yesterday, sputum sample for potential pneumonia if able to collect, lasix given yesterday, net -323 ml, Amio decreased to 0.5 mg/mn, no further episodes of sustained VT. Lactate improved now normal    Changes today:  Transition to PO amio, stop Amio infusion  lasix 40 mg IV BID today  Additional potassium with diuresis  CXR for occasional SOB/WBC elevation  Stop Midodrine  Cardiac rehab  Transfer to floor  Ramp up bowel regimen, add suppository  Follow up limited echo to reassess LV function  Psych consult for anxiety  Duonebs for wheezing  Start abx for presumed pna  Add on procal    Assessment and plan by system:    ===Neuro===  #Pain/Agitation/Sedation  Acetaminophen and Oxycodone PRN  - psych consult for anxiety per pt request     ===Cardiovascular===  #Anterior STEMI s/p PCI  #VT/VF arrest on 10/28  #Coronary Artery Disease  #Cardiomyopathy with reduced EF 30-35%  #HTN  #HLD  - Apixaban for anterior wall hypokinesis per team  - Transition to PO amio 10/30/23  - Lido stopped 10/28  - Continue Ticagrelor (plus Apixa)   - Continue home Atorvastatin  - Continue to hold metoprolol 12.5 mg BID for now  - Cardiac Rehab referral  - Telemetry monitoring   - Intermittent diuresis     ===Pulmonary===  # Hypoxia   # Probable TABATHA  - Still requesting supplmenetal o2 for comfort.   - CXR pending      ===Renal===  -JIE  - intermittent diuresis as above      ===Gastroenterology===  #Nutrition  #  "Constipation  Heart Healthy Diet        ===Infectious Disease===  #Leukocytosis  Improving  Temp (24hrs), Av.2  F (36.8  C), Min:97.7  F (36.5  C), Max:99.1  F (37.3  C)   Likely reactive in s/o STEMI. Trend and monitor for infectious symptoms. Possible component of pneumonia  NGTD     ===Hematology===  #Leukocytosis  As above.      FEN: HHD  Analgesia: Oxy PRN  Sedation: None  Anticoagulants: None  Ulcer PPX: None  Glucose: Consider sliding scale insulin if needed  SBT/SAT: NA  Bowel Regimen: PRN    Patient seen and discussed with staff physician.    Objective:  Most recent vital signs:  /82   Pulse 83   Temp 98.1  F (36.7  C) (Oral)   Resp 23   Ht 1.676 m (5' 5.98\")   Wt 65.8 kg (145 lb 1 oz)   SpO2 96%   BMI 23.43 kg/m    Temp:  [97.7  F (36.5  C)-99.1  F (37.3  C)] 98.1  F (36.7  C)  Pulse:  [72-87] 83  Resp:  [14-23] 23  BP: ()/(43-87) 120/82  SpO2:  [88 %-100 %] 96 %  Wt Readings from Last 2 Encounters:   10/29/23 65.8 kg (145 lb 1 oz)   10/09/23 67.1 kg (148 lb)         Intake/Output Summary (Last 24 hours) at 10/30/2023 0738  Last data filed at 10/30/2023 0600  Gross per 24 hour   Intake 2053.4 ml   Output 2225 ml   Net -171.6 ml       Physical exam:  General: In bed, in NAD  HEENT: PERRL, no scleral icterus or injection  CARDIAC: RRR, no m/r/g appreciated. Peripheral pulses dopplered  RESP: CTAB, no wheezes, rhonchi or crackles appreciated.  GI: soft, BS hypoactive  EXTREMITIES: NO LE edema, pulses 2+. Femoral access site w/o bleeding, dressing c/d/i.   SKIN: No acute lesions appreciated  NEURO: Alert and interactive    Labs (Past three days):  CBC  Recent Labs   Lab 10/30/23  0407 10/29/23  0623 10/28/23  0550 10/27/23  1943   WBC 14.0* 16.5* 12.8* 14.4*   RBC 3.46* 3.52* 3.73* 3.61*   HGB 11.3* 11.3* 12.0* 11.9*   HCT 33.2* 34.1* 35.6* 33.6*   MCV 96 97 95 93   MCH 32.7 32.1 32.2 33.0   MCHC 34.0 33.1 33.7 35.4   RDW 13.2 13.2 13.2 13.1    236 213 274     [facility]  Recent Labs "   Lab 10/30/23  0407 10/29/23  2145 10/29/23  1341 10/29/23  0348 10/28/23  1401 10/28/23  0550 10/27/23  2209 10/27/23  1944 10/27/23  0828 10/27/23  0418     --  142 139 137 138   < > 136   < > 138   POTASSIUM 3.8 4.3 3.9 3.9 4.3 4.1   < > 4.1   < > 4.0   CHLORIDE 104  --  105 104 103 104   < > 100   < > 106   CO2 24  --  25 21* 23 23   < > 24   < > 23   ANIONGAP 11  --  12 14 11 11   < > 12   < > 9   *  --  154* 115* 108* 91   < > 120*   < > 100*   BUN 34.7*  --  33.6* 31.4* 27.2* 22.9   < > 18.3   < > 15.8   CR 1.08  --  1.12 1.05 1.14 0.95   < > 1.00   < > 0.79   GFRESTIMATED 72  --  69 75 68 85   < > 79   < > >90   PRANAV 8.6*  --  8.9 8.7* 8.5* 8.5*   < > 9.0   < > 8.1*   MAG 1.9  --   --  2.1  --  2.2  --  2.3  --  2.1   PHOS 1.7*  --   --  4.5  --   --   --  3.1  --  2.1*    < > = values in this interval not displayed.     Troponins:     INR  Recent Labs   Lab 10/27/23  1943 10/26/23  0059   INR 1.23* 1.10     Liver panel  Recent Labs   Lab 10/30/23  0407 10/29/23  1341 10/29/23  0348 10/28/23  1401   PROTTOTAL 6.3* 6.7 6.9 6.2*   ALBUMIN 3.2* 3.5 3.6 3.3*   BILITOTAL 0.8 0.8 0.7 0.9   ALKPHOS 71 73 80 75   AST 89* 123* 154* 203*   ALT 42 54 57 53       Imaging/procedure results:  XR Chest Port 1 View  Narrative: EXAM: XR CHEST PORT 1 VIEW 10/29/2023 12:54 PM      HISTORY: eval s/p cardiac arrest.    COMPARISON: Chest radiograph 10/27/2023.     TECHNIQUE: Frontal view of the chest.    FINDINGS: Trachea is midline. Cardiac silhouette is within normal  limits. Pulmonary vasculature is distinct. Blunted right costophrenic  angle. Left costophrenic angle is clear. No appreciable pneumothorax.  Reticular opacity of the right lung, increased compared to prior.  Aortic arch atherosclerotic calcifications. Bibasilar streaky  opacities.    No acute osseous abnormality. Visualized soft tissues and upper  abdomen are unremarkable.  Impression: IMPRESSION:   1. Increased reticular opacity particularly of the  right upper and  midlung, may represent aspiration and/or edema.  2. Bibasilar streaky opacities likely representing atelectasis.    I have personally reviewed the examination and initial interpretation  and I agree with the findings.    KHLOE FRANCISCO DO         SYSTEM ID:  [id number]

## 2023-10-30 NOTE — CONSULTS
Initial Psychiatric Consult   Consult date: October 30, 2023    Labs and imaging reviewed. Patient seen and evaluated by Pérez Johnson MD cxr with possible pneumonia     I.D. Mr. Duane Johnson is a 73-year-old male admitted with an and STEMI followed by 2 arrests.  He has developed very frequent panic attacks.  I am asked to evaluate his panic by Elizabeth LIGHT total time 65 minutes      HPI:   Prior to interviewing this patient I had an opportunity to review the electronic medical record and reviewed the case with the patient's primary nurse.  Nursing is very concerned that this patient's panic attacks have become a serious safety issue.  Nursing reports that the patient will jump up and perhaps run about the room or simply jump from his chair into his bed.  This is an issue because the patient has several lines IVs etc.    On interviewing the patient he reports he has up to 7 episodes per day in which she has difficulty breathing, feeling that he is going to die and butterflies in his stomach.  He finds these episodes very troubling and they can last from 5 minutes to 20 minutes.  He reports that he had panic attacks once before during the hospitalization but not when at home.  He thinks he has panic episodes started at around age 50 which would be quite atypical for panic disorder but not unusual if the panic is secondary to medical illness.  The patient does receive some benefit from hydroxyzine.  The patient does have an elevated QTc with recent cardiac arrests requiring CPR.  I did discuss this case with the cardiology team, and they would like to avoid medications that might increase QTc.  Typically the first line medication for panic might be an SSRI, but given the acuity and potential dangerousness of this luigi panic.  I am going to recommend a long acting benzodiazepine, Klonopin 0.5 mg p.o. twice a day.  This could be titrated as necessary.        Past Psychiatric History:   he patient  "reports he is very perfectionistic\" it is his numbers\" he reports very few friends in childhood.  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      Substance Use and History:   The patient reports that as a young man he experimented with drugs and alcohol but none for years        Past Medical History:   PAST MEDICAL HISTORY:   Past Medical History:   Diagnosis Date    Hypertension        PAST SURGICAL HISTORY:   Past Surgical History:   Procedure Laterality Date    COLONOSCOPY N/A 3/23/2023    Procedure: COLONOSCOPY, FLEXIBLE, WITH LESION REMOVAL USING SNARE;  Surgeon: Jose Faustin MD;  Location: [facility]    CV CORONARY ANGIOGRAM N/A 10/27/2023    Procedure: Coronary Angiogram;  Surgeon: Rob Lyles MD;  Location: [facility] CARDIAC CATH LAB    CV PCI N/A 10/27/2023    Procedure: Percutaneous Coronary Intervention;  Surgeon: Rob Lyles MD;  Location: [facility] CARDIAC CATH LAB             Family History:   FAMILY HISTORY:   Family History   Problem Relation Age of Onset    Other Cancer Mother     Obesity Mother     Cervical Cancer Sister     GI problems Father     Other Cancer Sister        Family Psychiatric History: Patient is unaware of a family psych history QTC       Social History:   SOCIAL HISTORY:   Social History     Tobacco Use    Smoking status: Every Day     Packs/day: .25     Types: Cigarettes     Passive exposure: Current    Smokeless tobacco: Never   Substance Use Topics    Alcohol use: Yes     Comment: occas   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  Limited urinary output, panic symptoms as mentioned above, intermittent shortness of breath           Medications:      amiodarone  400 mg Oral BID    apixaban " ANTICOAGULANT  5 mg Oral BID    atorvastatin  80 mg Oral Daily    bisacodyl  10 mg Rectal Daily    cefTRIAXone  1 g Intravenous Q24H    furosemide  40 mg Intravenous BID    ipratropium - albuterol 0.5 mg/2.5 mg/3 mL  3 mL Nebulization 4x daily    magnesium oxide  400 mg Oral Q4H    melatonin  5 mg Oral QPM    [Held by provider] metoprolol tartrate  12.5 mg Oral BID    nicotine  1 patch Transdermal Daily    nicotine   Transdermal Q8H    polyethylene glycol  17 g Oral BID    potassium chloride  40 mEq Oral Once    senna-docusate  2 tablet Oral BID    sodium chloride (PF)  3 mL Intracatheter Q8H    ticagrelor  90 mg Oral Q12H              Allergies:   No Known Allergies       Labs:     Recent Results (from the past 48 hour(s))   Lactic acid whole blood    Collection Time: 10/28/23 10:08 AM   Result Value Ref Range    Lactic Acid 2.1 (H) 0.7 - 2.0 mmol/L   Troponin T, High Sensitivity    Collection Time: 10/28/23 10:08 AM   Result Value Ref Range    Troponin T, High Sensitivity 6,006 (HH) <=22 ng/L   EKG 12-lead, tracing only    Collection Time: 10/28/23 10:21 AM   Result Value Ref Range    Systolic Blood Pressure  mmHg    Diastolic Blood Pressure  mmHg    Ventricular Rate 66 BPM    Atrial Rate 66 BPM    MO Interval 196 ms    QRS Duration 92 ms     ms    QTc 442 ms    P Axis 50 degrees    R AXIS 8 degrees    T Axis 55 degrees    Interpretation ECG       Sinus rhythm  Low voltage QRS  Cannot rule out Anteroseptal infarct (cited on or before 26-OCT-2023)  T wave abnormality, consider lateral ischemia  Abnormal ECG  When compared with ECG of 28-OCT-2023 04:30, (unconfirmed)  Nonspecific T wave abnormality now evident in Inferior leads  QT has shortened     EKG 12-lead, tracing only    Collection Time: 10/28/23 10:21 AM   Result Value Ref Range    Systolic Blood Pressure  mmHg    Diastolic Blood Pressure  mmHg    Ventricular Rate 66 BPM    Atrial Rate 66 BPM    MO Interval 196 ms    QRS Duration 92 ms     ms     QTc 442 ms    P Axis 50 degrees    R AXIS 8 degrees    T Axis 55 degrees    Interpretation ECG       Sinus rhythm  Low voltage QRS  Cannot rule out Anteroseptal infarct (cited on or before 26-OCT-2023)  T wave abnormality, consider lateral ischemia  Abnormal ECG  When compared with ECG of 28-OCT-2023 04:30, (unconfirmed)  Nonspecific T wave abnormality now evident in Inferior leads  QT has shortened     Comprehensive metabolic panel    Collection Time: 10/28/23  2:01 PM   Result Value Ref Range    Sodium 137 135 - 145 mmol/L    Potassium 4.3 3.4 - 5.3 mmol/L    Carbon Dioxide (CO2) 23 22 - 29 mmol/L    Anion Gap 11 7 - 15 mmol/L    Urea Nitrogen 27.2 (H) 8.0 - 23.0 mg/dL    Creatinine 1.14 0.67 - 1.17 mg/dL    GFR Estimate 68 >60 mL/min/1.73m2    Calcium 8.5 (L) 8.8 - 10.2 mg/dL    Chloride 103 98 - 107 mmol/L    Glucose 108 (H) 70 - 99 mg/dL    Alkaline Phosphatase 75 40 - 129 U/L     (H) 0 - 45 U/L    ALT 53 0 - 70 U/L    Protein Total 6.2 (L) 6.4 - 8.3 g/dL    Albumin 3.3 (L) 3.5 - 5.2 g/dL    Bilirubin Total 0.9 <=1.2 mg/dL   Lactic acid whole blood    Collection Time: 10/28/23  2:01 PM   Result Value Ref Range    Lactic Acid 1.3 0.7 - 2.0 mmol/L   Lipoprotein (a)    Collection Time: 10/28/23  2:01 PM   Result Value Ref Range    Lipoprotein (a) 26 <30 mg/dL   Lactic acid whole blood    Collection Time: 10/28/23  3:48 PM   Result Value Ref Range    Lactic Acid 1.8 0.7 - 2.0 mmol/L   Lactic acid whole blood    Collection Time: 10/28/23  9:49 PM   Result Value Ref Range    Lactic Acid 2.0 0.7 - 2.0 mmol/L   Comprehensive metabolic panel    Collection Time: 10/29/23  3:48 AM   Result Value Ref Range    Sodium 139 135 - 145 mmol/L    Potassium 3.9 3.4 - 5.3 mmol/L    Carbon Dioxide (CO2) 21 (L) 22 - 29 mmol/L    Anion Gap 14 7 - 15 mmol/L    Urea Nitrogen 31.4 (H) 8.0 - 23.0 mg/dL    Creatinine 1.05 0.67 - 1.17 mg/dL    GFR Estimate 75 >60 mL/min/1.73m2    Calcium 8.7 (L) 8.8 - 10.2 mg/dL    Chloride 104 98 -  107 mmol/L    Glucose 115 (H) 70 - 99 mg/dL    Alkaline Phosphatase 80 40 - 129 U/L     (H) 0 - 45 U/L    ALT 57 0 - 70 U/L    Protein Total 6.9 6.4 - 8.3 g/dL    Albumin 3.6 3.5 - 5.2 g/dL    Bilirubin Total 0.7 <=1.2 mg/dL   Lactic acid whole blood    Collection Time: 10/29/23  3:48 AM   Result Value Ref Range    Lactic Acid 2.9 (H) 0.7 - 2.0 mmol/L   Magnesium    Collection Time: 10/29/23  3:48 AM   Result Value Ref Range    Magnesium 2.1 1.7 - 2.3 mg/dL   Other Laboratory; Medtox; Lidocaine test code 60 (Laboratory Miscellaneous Order)    Collection Time: 10/29/23  3:48 AM   Result Value Ref Range    See Scanned Result LABORATORY MISCELLANEOUS ORDER-Scanned (A)    Phosphorus    Collection Time: 10/29/23  3:48 AM   Result Value Ref Range    Phosphorus 4.5 2.5 - 4.5 mg/dL   CBC with platelets    Collection Time: 10/29/23  6:23 AM   Result Value Ref Range    WBC Count 16.5 (H) 4.0 - 11.0 10e3/uL    RBC Count 3.52 (L) 4.40 - 5.90 10e6/uL    Hemoglobin 11.3 (L) 13.3 - 17.7 g/dL    Hematocrit 34.1 (L) 40.0 - 53.0 %    MCV 97 78 - 100 fL    MCH 32.1 26.5 - 33.0 pg    MCHC 33.1 31.5 - 36.5 g/dL    RDW 13.2 10.0 - 15.0 %    Platelet Count 236 150 - 450 10e3/uL   Lactic acid whole blood    Collection Time: 10/29/23  9:35 AM   Result Value Ref Range    Lactic Acid 2.7 (H) 0.7 - 2.0 mmol/L   Comprehensive metabolic panel    Collection Time: 10/29/23  1:41 PM   Result Value Ref Range    Sodium 142 135 - 145 mmol/L    Potassium 3.9 3.4 - 5.3 mmol/L    Carbon Dioxide (CO2) 25 22 - 29 mmol/L    Anion Gap 12 7 - 15 mmol/L    Urea Nitrogen 33.6 (H) 8.0 - 23.0 mg/dL    Creatinine 1.12 0.67 - 1.17 mg/dL    GFR Estimate 69 >60 mL/min/1.73m2    Calcium 8.9 8.8 - 10.2 mg/dL    Chloride 105 98 - 107 mmol/L    Glucose 154 (H) 70 - 99 mg/dL    Alkaline Phosphatase 73 40 - 129 U/L     (H) 0 - 45 U/L    ALT 54 0 - 70 U/L    Protein Total 6.7 6.4 - 8.3 g/dL    Albumin 3.5 3.5 - 5.2 g/dL    Bilirubin Total 0.8 <=1.2 mg/dL    Lactic acid whole blood    Collection Time: 10/29/23  1:41 PM   Result Value Ref Range    Lactic Acid 2.9 (H) 0.7 - 2.0 mmol/L   Respiratory Aerobic Bacterial Culture    Collection Time: 10/29/23  3:51 PM    Specimen: Expectorate; Sputum   Result Value Ref Range    Culture       >10 Squamous epithelial cells/low power field indicates oral contamination. Please recollect.    Gram Stain Result >10 Squamous epithelial cells/low power field     Gram Stain Result >25 PMNs/low power field     Gram Stain Result 3+ Mixed rakesh    EKG 12-lead, complete    Collection Time: 10/29/23  5:00 PM   Result Value Ref Range    Systolic Blood Pressure  mmHg    Diastolic Blood Pressure  mmHg    Ventricular Rate 76 BPM    Atrial Rate 76 BPM    AK Interval 172 ms    QRS Duration 92 ms     ms    QTc 551 ms    P Axis 32 degrees    R AXIS -15 degrees    T Axis -6 degrees    Interpretation ECG       Sinus rhythm  Low voltage QRS  Cannot rule out Anteroseptal infarct (cited on or before 26-OCT-2023)  Prolonged QT  Abnormal ECG  When compared with ECG of 28-OCT-2023 10:21, (unconfirmed)  Serial changes of Anteroseptal infarct Present     EKG 12-lead, complete    Collection Time: 10/29/23  9:37 PM   Result Value Ref Range    Systolic Blood Pressure  mmHg    Diastolic Blood Pressure  mmHg    Ventricular Rate 79 BPM    Atrial Rate 79 BPM    AK Interval 180 ms    QRS Duration 92 ms     ms    QTc 481 ms    P Axis 34 degrees    R AXIS -25 degrees    T Axis 10 degrees    Interpretation ECG       Sinus rhythm  Low voltage QRS  Cannot rule out Anteroseptal infarct (cited on or before 26-OCT-2023)  Abnormal ECG  When compared with ECG of 29-OCT-2023 17:00, (unconfirmed)  QT has shortened     Lactic acid whole blood    Collection Time: 10/29/23  9:45 PM   Result Value Ref Range    Lactic Acid 1.3 0.7 - 2.0 mmol/L   Potassium    Collection Time: 10/29/23  9:45 PM   Result Value Ref Range    Potassium 4.3 3.4 - 5.3 mmol/L   Respiratory Aerobic  Bacterial Culture with Gram Stain    Collection Time: 10/30/23  2:57 AM    Specimen: Expectorate; Sputum   Result Value Ref Range    Culture       >10 Squamous epithelial cells/low power field indicates oral contamination. Please recollect.    Gram Stain Result >10 Squamous epithelial cells/low power field     Gram Stain Result >25 PMNs/low power field     Gram Stain Result 4+ Mixed rakesh    Comprehensive metabolic panel    Collection Time: 10/30/23  4:07 AM   Result Value Ref Range    Sodium 139 135 - 145 mmol/L    Potassium 3.8 3.4 - 5.3 mmol/L    Carbon Dioxide (CO2) 24 22 - 29 mmol/L    Anion Gap 11 7 - 15 mmol/L    Urea Nitrogen 34.7 (H) 8.0 - 23.0 mg/dL    Creatinine 1.08 0.67 - 1.17 mg/dL    GFR Estimate 72 >60 mL/min/1.73m2    Calcium 8.6 (L) 8.8 - 10.2 mg/dL    Chloride 104 98 - 107 mmol/L    Glucose 112 (H) 70 - 99 mg/dL    Alkaline Phosphatase 71 40 - 129 U/L    AST 89 (H) 0 - 45 U/L    ALT 42 0 - 70 U/L    Protein Total 6.3 (L) 6.4 - 8.3 g/dL    Albumin 3.2 (L) 3.5 - 5.2 g/dL    Bilirubin Total 0.8 <=1.2 mg/dL   Lactic acid whole blood    Collection Time: 10/30/23  4:07 AM   Result Value Ref Range    Lactic Acid 1.2 0.7 - 2.0 mmol/L   Magnesium    Collection Time: 10/30/23  4:07 AM   Result Value Ref Range    Magnesium 1.9 1.7 - 2.3 mg/dL   CBC with platelets    Collection Time: 10/30/23  4:07 AM   Result Value Ref Range    WBC Count 14.0 (H) 4.0 - 11.0 10e3/uL    RBC Count 3.46 (L) 4.40 - 5.90 10e6/uL    Hemoglobin 11.3 (L) 13.3 - 17.7 g/dL    Hematocrit 33.2 (L) 40.0 - 53.0 %    MCV 96 78 - 100 fL    MCH 32.7 26.5 - 33.0 pg    MCHC 34.0 31.5 - 36.5 g/dL    RDW 13.2 10.0 - 15.0 %    Platelet Count 263 150 - 450 10e3/uL   Phosphorus    Collection Time: 10/30/23  4:07 AM   Result Value Ref Range    Phosphorus 1.7 (L) 2.5 - 4.5 mg/dL   Echocardiogram Limited    Collection Time: 10/30/23  8:35 AM   Result Value Ref Range    Biplane LVEF 30%     LVEF  20-30% (severely reduced)           Physical and  "Psychiatric Examination:     /82   Pulse 83   Temp 98.1  F (36.7  C) (Oral)   Resp 23   Ht 1.676 m (5' 5.98\")   Wt 65.8 kg (145 lb 1 oz)   SpO2 96%   BMI 23.43 kg/m    Weight is 145 lbs 1 oz  Body mass index is 23.43 kg/m .    Mental Status Exam:    On my interview the patient was dressed in hospital garb and appeared in no current distress. Patient was pleasant and cooperative, mood neutral, affect full, speech was coherent and goal oriented. Associations tight although nursing reports occasional tangential speech. Thought process was logical and linear. Content of thought without psychosis or suicidal ideation. Patient alert and oriented x 4.  Recent and remote memory, nursing notes occasional short-term memory deficits concentration, fund of knowledge, and use of language were intact. Insight and judgement intact.                Assessment   Panic attacks secondary to general medical condition (cardiac arrest x 2) consider obsessive-compulsive personality disorder          Plan:   Klonopin 0.5 mg p.o. twice a day    Case was discussed with cardiology team and primary nurse        Pérez Johnson MD                "

## 2023-10-30 NOTE — PLAN OF CARE
Major Shift Events:  AOx4. Occasional panic attacks. Redirection and reassurance provided. PRN atarax given. 4L NC. SR with occasional PVCs. BPs WDL. Due to void this AM. Last BM pta, bowel meds given. Amio gtt 0.5  Plan: transfer to floor when ready  For vital signs and complete assessments, please see documentation flowsheets.

## 2023-10-31 LAB
ALBUMIN SERPL BCG-MCNC: 3.1 G/DL (ref 3.5–5.2)
ALBUMIN SERPL BCG-MCNC: 3.2 G/DL (ref 3.5–5.2)
ALP SERPL-CCNC: 71 U/L (ref 40–129)
ALP SERPL-CCNC: 73 U/L (ref 40–129)
ALT SERPL W P-5'-P-CCNC: 41 U/L (ref 0–70)
ALT SERPL W P-5'-P-CCNC: 42 U/L (ref 0–70)
ANION GAP SERPL CALCULATED.3IONS-SCNC: 11 MMOL/L (ref 7–15)
ANION GAP SERPL CALCULATED.3IONS-SCNC: 14 MMOL/L (ref 7–15)
AST SERPL W P-5'-P-CCNC: 64 U/L (ref 0–45)
AST SERPL W P-5'-P-CCNC: 67 U/L (ref 0–45)
BACTERIA SPT CULT: NORMAL
BILIRUB SERPL-MCNC: 0.8 MG/DL
BILIRUB SERPL-MCNC: 1.1 MG/DL
BUN SERPL-MCNC: 31.3 MG/DL (ref 8–23)
BUN SERPL-MCNC: 31.3 MG/DL (ref 8–23)
CALCIUM SERPL-MCNC: 8.6 MG/DL (ref 8.8–10.2)
CALCIUM SERPL-MCNC: 8.7 MG/DL (ref 8.8–10.2)
CHLORIDE SERPL-SCNC: 102 MMOL/L (ref 98–107)
CHLORIDE SERPL-SCNC: 105 MMOL/L (ref 98–107)
CREAT SERPL-MCNC: 0.96 MG/DL (ref 0.67–1.17)
CREAT SERPL-MCNC: 1.05 MG/DL (ref 0.67–1.17)
DEPRECATED HCO3 PLAS-SCNC: 23 MMOL/L (ref 22–29)
DEPRECATED HCO3 PLAS-SCNC: 25 MMOL/L (ref 22–29)
EGFRCR SERPLBLD CKD-EPI 2021: 75 ML/MIN/1.73M2
EGFRCR SERPLBLD CKD-EPI 2021: 83 ML/MIN/1.73M2
ERYTHROCYTE [DISTWIDTH] IN BLOOD BY AUTOMATED COUNT: 13.1 % (ref 10–15)
GLUCOSE SERPL-MCNC: 81 MG/DL (ref 70–99)
GLUCOSE SERPL-MCNC: 92 MG/DL (ref 70–99)
GRAM STAIN RESULT: NORMAL
HCT VFR BLD AUTO: 33.4 % (ref 40–53)
HGB BLD-MCNC: 11.4 G/DL (ref 13.3–17.7)
LACTATE SERPL-SCNC: 1 MMOL/L (ref 0.7–2)
LACTATE SERPL-SCNC: 1.2 MMOL/L (ref 0.7–2)
MAGNESIUM SERPL-MCNC: 2 MG/DL (ref 1.7–2.3)
MCH RBC QN AUTO: 32.8 PG (ref 26.5–33)
MCHC RBC AUTO-ENTMCNC: 34.1 G/DL (ref 31.5–36.5)
MCV RBC AUTO: 96 FL (ref 78–100)
PLATELET # BLD AUTO: 270 10E3/UL (ref 150–450)
POTASSIUM SERPL-SCNC: 3.9 MMOL/L (ref 3.4–5.3)
POTASSIUM SERPL-SCNC: 3.9 MMOL/L (ref 3.4–5.3)
PROT SERPL-MCNC: 6.3 G/DL (ref 6.4–8.3)
PROT SERPL-MCNC: 6.6 G/DL (ref 6.4–8.3)
RBC # BLD AUTO: 3.48 10E6/UL (ref 4.4–5.9)
SODIUM SERPL-SCNC: 139 MMOL/L (ref 135–145)
SODIUM SERPL-SCNC: 141 MMOL/L (ref 135–145)
WBC # BLD AUTO: 10.7 10E3/UL (ref 4–11)

## 2023-10-31 PROCEDURE — 36415 COLL VENOUS BLD VENIPUNCTURE: CPT | Performed by: STUDENT IN AN ORGANIZED HEALTH CARE EDUCATION/TRAINING PROGRAM

## 2023-10-31 PROCEDURE — 250N000013 HC RX MED GY IP 250 OP 250 PS 637: Performed by: NURSE PRACTITIONER

## 2023-10-31 PROCEDURE — 250N000011 HC RX IP 250 OP 636: Mod: JZ | Performed by: NURSE PRACTITIONER

## 2023-10-31 PROCEDURE — 200N000002 HC R&B ICU UMMC

## 2023-10-31 PROCEDURE — 250N000013 HC RX MED GY IP 250 OP 250 PS 637: Performed by: STUDENT IN AN ORGANIZED HEALTH CARE EDUCATION/TRAINING PROGRAM

## 2023-10-31 PROCEDURE — 84155 ASSAY OF PROTEIN SERUM: CPT | Performed by: STUDENT IN AN ORGANIZED HEALTH CARE EDUCATION/TRAINING PROGRAM

## 2023-10-31 PROCEDURE — 93005 ELECTROCARDIOGRAM TRACING: CPT

## 2023-10-31 PROCEDURE — 99233 SBSQ HOSP IP/OBS HIGH 50: CPT | Mod: FS

## 2023-10-31 PROCEDURE — 250N000013 HC RX MED GY IP 250 OP 250 PS 637

## 2023-10-31 PROCEDURE — 85027 COMPLETE CBC AUTOMATED: CPT | Performed by: NURSE PRACTITIONER

## 2023-10-31 PROCEDURE — 87205 SMEAR GRAM STAIN: CPT | Performed by: INTERNAL MEDICINE

## 2023-10-31 PROCEDURE — 84450 TRANSFERASE (AST) (SGOT): CPT | Performed by: STUDENT IN AN ORGANIZED HEALTH CARE EDUCATION/TRAINING PROGRAM

## 2023-10-31 PROCEDURE — 83605 ASSAY OF LACTIC ACID: CPT

## 2023-10-31 PROCEDURE — 250N000013 HC RX MED GY IP 250 OP 250 PS 637: Performed by: INTERNAL MEDICINE

## 2023-10-31 PROCEDURE — 83605 ASSAY OF LACTIC ACID: CPT | Performed by: STUDENT IN AN ORGANIZED HEALTH CARE EDUCATION/TRAINING PROGRAM

## 2023-10-31 PROCEDURE — 83735 ASSAY OF MAGNESIUM: CPT | Performed by: INTERNAL MEDICINE

## 2023-10-31 RX ORDER — CARVEDILOL 3.12 MG/1
3.12 TABLET ORAL 2 TIMES DAILY WITH MEALS
Status: DISCONTINUED | OUTPATIENT
Start: 2023-10-31 | End: 2023-11-03

## 2023-10-31 RX ORDER — MAGNESIUM OXIDE 400 MG/1
400 TABLET ORAL EVERY 4 HOURS
Status: COMPLETED | OUTPATIENT
Start: 2023-10-31 | End: 2023-10-31

## 2023-10-31 RX ADMIN — TICAGRELOR 90 MG: 90 TABLET ORAL at 20:03

## 2023-10-31 RX ADMIN — TICAGRELOR 90 MG: 90 TABLET ORAL at 07:41

## 2023-10-31 RX ADMIN — AMIODARONE HYDROCHLORIDE 400 MG: 200 TABLET ORAL at 07:40

## 2023-10-31 RX ADMIN — APIXABAN 5 MG: 5 TABLET, FILM COATED ORAL at 20:03

## 2023-10-31 RX ADMIN — SENNOSIDES AND DOCUSATE SODIUM 2 TABLET: 8.6; 5 TABLET ORAL at 07:40

## 2023-10-31 RX ADMIN — CARVEDILOL 3.12 MG: 3.12 TABLET, FILM COATED ORAL at 18:18

## 2023-10-31 RX ADMIN — ATORVASTATIN CALCIUM 80 MG: 80 TABLET, FILM COATED ORAL at 07:40

## 2023-10-31 RX ADMIN — POLYETHYLENE GLYCOL 3350 17 G: 17 POWDER, FOR SOLUTION ORAL at 07:40

## 2023-10-31 RX ADMIN — MAGNESIUM OXIDE TAB 400 MG (241.3 MG ELEMENTAL MG) 400 MG: 400 (241.3 MG) TAB at 06:11

## 2023-10-31 RX ADMIN — Medication 5 MG: at 22:18

## 2023-10-31 RX ADMIN — NICOTINE 1 PATCH: 7 PATCH, EXTENDED RELEASE TRANSDERMAL at 07:40

## 2023-10-31 RX ADMIN — MAGNESIUM OXIDE TAB 400 MG (241.3 MG ELEMENTAL MG) 400 MG: 400 (241.3 MG) TAB at 09:57

## 2023-10-31 RX ADMIN — APIXABAN 5 MG: 5 TABLET, FILM COATED ORAL at 07:40

## 2023-10-31 RX ADMIN — CEFTRIAXONE SODIUM 1 G: 1 INJECTION, POWDER, FOR SOLUTION INTRAMUSCULAR; INTRAVENOUS at 08:59

## 2023-10-31 RX ADMIN — AMIODARONE HYDROCHLORIDE 400 MG: 200 TABLET ORAL at 20:03

## 2023-10-31 ASSESSMENT — ACTIVITIES OF DAILY LIVING (ADL)
ADLS_ACUITY_SCORE: 30
ADLS_ACUITY_SCORE: 30
ADLS_ACUITY_SCORE: 31
ADLS_ACUITY_SCORE: 31
ADLS_ACUITY_SCORE: 30
ADLS_ACUITY_SCORE: 31
ADLS_ACUITY_SCORE: 30
ADLS_ACUITY_SCORE: 31
ADLS_ACUITY_SCORE: 30
ADLS_ACUITY_SCORE: 30
ADLS_ACUITY_SCORE: 31
ADLS_ACUITY_SCORE: 30

## 2023-10-31 NOTE — PLAN OF CARE
Major Shift Events:  Alert and oriented x4, episodes of intermittent anxiety/panic attacks, PRN Atarax given. RORY OCONNOR for transfers. Sinus rhythm, 1 episode of a-fib around 0000, lasting approx 1 hour. Asymptomatic. Team notified, EKG obtained, no intervention needed. LS clear, 2L NC while sleeping, RA when awake. Uses urinal independently.   Plan: Transfer to 6th floor pending bed availability.   For vital signs and complete assessments, please see documentation flowsheets.

## 2023-10-31 NOTE — PROGRESS NOTES
Cardiology ICU Progress Note    Brief HPI:  Mr Duane C Johnson is a 73 year old gentleman with a history of HTN, HLD, and tobacco use presented to ED 10/26 with acute chest pain and EKG demonstrating anterior STEMI for which he underwent coronary angigoram and had PCI with BRENDA to pLAD. His course is complicated by subsequent VF/VT cardiac arrest x2 on 10/27/23 for which repeat angio performed revealing patent stents He is now transferred to the CICU.    Subjective and Interval:  Feeling good this morning. Slept part of the night. Denies SOB on 2 L NC, will place on RA and see if his SOB returns. LS clear and diminished, productive cough,  Sputum culture NGTD, CXR with continued opacity of R lung, ceftriaxone started yesterday due to rising WBC, BC decreasing today. ECHO continues to show apical wall akinesis, on DOAC. EF 20-30%. Eucolemic. Unable to start GDMT due to hypotension and LFT have not normalized.     Changes today:  -Start coreg 3.125 for GDMT  -PT OT eval for discharge  -FBG even, no diureses today  -At discharge switch ceftriaxone to Augmentin  -Does not have prescription coverage on Medicaid-Ticagrelor for one month with coupon card, then can switch to plavix  -Wean O2  -transfer to floor    Assessment and plan by system:    ===Neuro===  #Pain/Agitation/Sedation  Acetaminophen and Oxycodone PRN  - psych consult for anxiety per pt request     ===Cardiovascular===  #Anterior STEMI s/p PCI  #VT/VF arrest on 10/28  #Coronary Artery Disease  #Cardiomyopathy with reduced EF 30-35%  #HTN  #HLD  - Apixaban for anterior wall hypokinesis per team  - Transition to PO amio 10/30/23  - Lido stopped 10/28  - Continue Ticagrelor (plus Apixa)   - Continue home Atorvastatin  - Continue to hold metoprolol 12.5 mg BID for now  - Cardiac Rehab referral  - Telemetry monitoring   - Intermittent diuresis     ===Pulmonary===  # Hypoxia   # Probable TABATHA  - Still requesting supplmenetal o2 for comfort.   - CXR R lung opacity,  "productive cough     ===Renal===  -JIE  - intermittent diuresis as above      ===Gastroenterology===  #Nutrition  # Constipation  Heart Healthy Diet        ===Infectious Disease===  #Leukocytosis  Improving  Temp (24hrs), Av.2  F (36.8  C), Min:97.7  F (36.5  C), Max:99.1  F (37.3  C)   Likely reactive in s/o STEMI. Trend and monitor for infectious symptoms. Possible component of pneumonia  NGTD     ===Hematology===  #Leukocytosis  As above.      FEN: HHD  Analgesia: Oxy PRN  Sedation: None  Anticoagulants: Apixaban for apical wall akinesis  Ulcer PPX: None  Glucose: Consider sliding scale insulin if needed  SBT/SAT: NA  Bowel Regimen: Scheduled    Patient seen and discussed with staff physician.    Objective:  Most recent vital signs:  /65 (BP Location: Left arm, Cuff Size: Adult Small)   Pulse 82   Temp 98.3  F (36.8  C) (Oral)   Resp 22   Ht 1.676 m (5' 5.98\")   Wt 70.1 kg (154 lb 8.7 oz)   SpO2 95%   BMI 24.96 kg/m    Temp:  [97.3  F (36.3  C)-99.4  F (37.4  C)] 98.3  F (36.8  C)  Pulse:  [80-95] 82  Resp:  [14-33] 22  BP: ()/(52-81) 101/65  SpO2:  [91 %-96 %] 95 %  Wt Readings from Last 2 Encounters:   10/31/23 70.1 kg (154 lb 8.7 oz)   10/09/23 67.1 kg (148 lb)           Intake/Output Summary (Last 24 hours) at 10/31/2023 1448  Last data filed at 10/31/2023 1200  Gross per 24 hour   Intake 1160 ml   Output 1425 ml   Net -265 ml         Physical exam:  General: In bed, in NAD  HEENT: PERRL, no scleral icterus or injection  CARDIAC: RRR, no m/r/g appreciated. Peripheral pulses dopplered  RESP: CTAB, no wheezes, rhonchi or crackles appreciated.  GI: soft, BS hypoactive  EXTREMITIES: NO LE edema, pulses 2+. Femoral access site w/o bleeding, dressing c/d/i.   SKIN: No acute lesions appreciated  NEURO: Alert and interactive    Labs (Past three days):  CBC  Recent Labs   Lab 10/31/23  0431 10/30/23  0407 10/29/23  0623 10/28/23  0550   WBC 10.7 14.0* 16.5* 12.8*   RBC 3.48* 3.46* 3.52* 3.73* "   HGB 11.4* 11.3* 11.3* 12.0*   HCT 33.4* 33.2* 34.1* 35.6*   MCV 96 96 97 95   MCH 32.8 32.7 32.1 32.2   MCHC 34.1 34.0 33.1 33.7   RDW 13.1 13.2 13.2 13.2    263 236 213     BMP  Recent Labs   Lab 10/31/23  0431 10/30/23  1833 10/30/23  1550 10/30/23  0407 10/29/23  2145 10/29/23  1341 10/29/23  0348 10/28/23  1401 10/28/23  0550 10/27/23  2209 10/27/23  1944 10/27/23  0828 10/27/23  0418     --  141 139  --  142 139   < > 138   < > 136   < > 138   POTASSIUM 3.9  --  3.8 3.8 4.3 3.9 3.9   < > 4.1   < > 4.1   < > 4.0   CHLORIDE 105  --  103 104  --  105 104   < > 104   < > 100   < > 106   CO2 25  --  23 24  --  25 21*   < > 23   < > 24   < > 23   ANIONGAP 11  --  15 11  --  12 14   < > 11   < > 12   < > 9   GLC 92 110* 114* 112*  --  154* 115*   < > 91   < > 120*   < > 100*   BUN 31.3*  --  32.5* 34.7*  --  33.6* 31.4*   < > 22.9   < > 18.3   < > 15.8   CR 1.05  --  0.98 1.08  --  1.12 1.05   < > 0.95   < > 1.00   < > 0.79   GFRESTIMATED 75  --  81 72  --  69 75   < > 85   < > 79   < > >90   PRANAV 8.6*  --  8.9 8.6*  --  8.9 8.7*   < > 8.5*   < > 9.0   < > 8.1*   MAG 2.0  --   --  1.9  --   --  2.1  --  2.2  --  2.3  --  2.1   PHOS  --   --   --  1.7*  --   --  4.5  --   --   --  3.1  --  2.1*    < > = values in this interval not displayed.     Troponins:     INR  Recent Labs   Lab 10/27/23  1943 10/26/23  0059   INR 1.23* 1.10     Liver panel  Recent Labs   Lab 10/31/23  0431 10/30/23  1550 10/30/23  0407 10/29/23  1341   PROTTOTAL 6.3* 6.9 6.3* 6.7   ALBUMIN 3.1* 3.4* 3.2* 3.5   BILITOTAL 0.8 0.8 0.8 0.8   ALKPHOS 71 78 71 73   AST 67* 81* 89* 123*   ALT 41 45 42 54       Imaging/procedure results:  Echocardiogram Limited  [id number]  [id number]  [id number]  [id number]^[email or web address]     [facility],[place]  Echocardiography Laboratory  [address] [place] [id number]     Name: JOHNSON, DUANE C  MRN: [id number]  : 1950  Study Date: 10/30/2023 07:37 AM  Age: 73 yrs  Gender:  Male  Patient Location: [facility]  Reason For Study: Heart Failure  Ordering Physician: FERNANDA HALEY  Performed By: Josefina Wakefield RDCS     BSA: 1.7 m2  Height: 66 in  Weight: 145 lb  BP: 108/67 mmHg  ______________________________________________________________________________  Procedure  Limited Echocardiogram with portions of two-dimensional, color and spectral  Doppler performed. Contrast Optison. Optison (NDC #1423-1368-63) given  intravenously. Patient was given 5 ml mixture of 3 ml Optison and 6 ml saline.  4 ml wasted.  ______________________________________________________________________________  Interpretation Summary  Ischemic CM. Large LAD MI. Left ventricular function is decreased. The  ejection fraction is 20-30% (severely reduced).  Global right ventricular function is normal.  No significant valvular abnormalities present.  IVC diameter <2.1 cm collapsing >50% with sniff suggests a normal RA pressure  of 3 mmHg.  No pericardial effusion is present.  No significant changes noted.  ______________________________________________________________________________  Left Ventricle  Biplane LVEF is 30%. Moderate to severe left ventricular dilation is present.  Left ventricular function is decreased. The ejection fraction is 20-30%  (severely reduced). Apical wall akinesis is present. There is no thrombus seen  in the left ventricle.     Right Ventricle  The right ventricle is normal size. Global right ventricular function is  normal.     Mitral Valve  Mild mitral insufficiency is present.     Aortic Valve  On Doppler interrogation, there is no significant stenosis or regurgitation.     Tricuspid Valve  The tricuspid valve is normal. The peak velocity of the tricuspid regurgitant  jet is not obtainable.     Pulmonic Valve  On Doppler interrogation, there is no significant stenosis or regurgitation.     Vessels  IVC diameter <2.1 cm collapsing >50% with sniff suggests a normal RA pressure  of 3 mmHg.      Pericardium  No pericardial effusion is present.     Miscellaneous  No significant valvular abnormalities present.     Compared to Previous Study  No significant changes noted.  ______________________________________________________________________________  Report approved by: Eusebio Antonio 10/30/2023 09:25 AM     ______________________________________________________________________________     XR Chest Port 1 View  Narrative: Portable chest    INDICATION: Follow-up. Short of breath.    COMPARISON: Yesterday    FINDINGS: Diffuse patchy opacities throughout the right hemithorax and  also in the left upper lung zone appears similar. Relative left mid  and lower lung zone well-preserved aeration is unchanged. Heart size  normal. No definite pneumothorax. There is atherosclerotic  calcification again at the aortic knob.  Impression: IMPRESSION: Diffuse opacities throughout the right lung and left upper  quadrant which may represent aspiration pneumonitis or other edema.  Not significantly changed from previous.    ANGELICA PRATHER MD         SYSTEM ID:  [id number]  Cardiac Catheterization  Unchanged angiogram with patient stent and good distal flow in all   arteries  Most likely evolving injury related VT   Amiodarone loaded 75 mg and continue on the floor loading and consult EP   for possible vest and or EP study

## 2023-11-01 ENCOUNTER — APPOINTMENT (OUTPATIENT)
Dept: PHYSICAL THERAPY | Facility: CLINIC | Age: 73
DRG: 321 | End: 2023-11-01
Payer: MEDICARE

## 2023-11-01 ENCOUNTER — APPOINTMENT (OUTPATIENT)
Dept: CT IMAGING | Facility: CLINIC | Age: 73
DRG: 321 | End: 2023-11-01
Attending: NURSE PRACTITIONER
Payer: MEDICARE

## 2023-11-01 ENCOUNTER — APPOINTMENT (OUTPATIENT)
Dept: OCCUPATIONAL THERAPY | Facility: CLINIC | Age: 73
DRG: 321 | End: 2023-11-01
Payer: MEDICARE

## 2023-11-01 LAB
ALBUMIN SERPL BCG-MCNC: 2.8 G/DL (ref 3.5–5.2)
ALBUMIN UR-MCNC: 30 MG/DL
ALP SERPL-CCNC: 72 U/L (ref 40–129)
ALT SERPL W P-5'-P-CCNC: 44 U/L (ref 0–70)
ANION GAP SERPL CALCULATED.3IONS-SCNC: 10 MMOL/L (ref 7–15)
APPEARANCE UR: CLEAR
AST SERPL W P-5'-P-CCNC: 72 U/L (ref 0–45)
ATRIAL RATE - MUSE: 67 BPM
ATRIAL RATE - MUSE: 76 BPM
ATRIAL RATE - MUSE: 79 BPM
ATRIAL RATE - MUSE: 99 BPM
ATRIAL RATE - MUSE: NORMAL BPM
BILIRUB SERPL-MCNC: 0.7 MG/DL
BILIRUB UR QL STRIP: NEGATIVE
BUN SERPL-MCNC: 34.2 MG/DL (ref 8–23)
CALCIUM SERPL-MCNC: 8.4 MG/DL (ref 8.8–10.2)
CHLORIDE SERPL-SCNC: 105 MMOL/L (ref 98–107)
COLOR UR AUTO: YELLOW
CREAT SERPL-MCNC: 0.88 MG/DL (ref 0.67–1.17)
DEPRECATED HCO3 PLAS-SCNC: 23 MMOL/L (ref 22–29)
DIASTOLIC BLOOD PRESSURE - MUSE: NORMAL MMHG
EGFRCR SERPLBLD CKD-EPI 2021: >90 ML/MIN/1.73M2
ERYTHROCYTE [DISTWIDTH] IN BLOOD BY AUTOMATED COUNT: 13.2 % (ref 10–15)
GLUCOSE BLDC GLUCOMTR-MCNC: 121 MG/DL (ref 70–99)
GLUCOSE SERPL-MCNC: 105 MG/DL (ref 70–99)
GLUCOSE UR STRIP-MCNC: NEGATIVE MG/DL
HCT VFR BLD AUTO: 32.1 % (ref 40–53)
HGB BLD-MCNC: 10.9 G/DL (ref 13.3–17.7)
HGB UR QL STRIP: ABNORMAL
INTERPRETATION ECG - MUSE: NORMAL
KETONES UR STRIP-MCNC: NEGATIVE MG/DL
LACTATE SERPL-SCNC: 1 MMOL/L (ref 0.7–2)
LEUKOCYTE ESTERASE UR QL STRIP: NEGATIVE
MAGNESIUM SERPL-MCNC: 2 MG/DL (ref 1.7–2.3)
MCH RBC QN AUTO: 32.5 PG (ref 26.5–33)
MCHC RBC AUTO-ENTMCNC: 34 G/DL (ref 31.5–36.5)
MCV RBC AUTO: 96 FL (ref 78–100)
MUCOUS THREADS #/AREA URNS LPF: PRESENT /LPF
NITRATE UR QL: NEGATIVE
P AXIS - MUSE: 32 DEGREES
P AXIS - MUSE: 34 DEGREES
P AXIS - MUSE: 46 DEGREES
P AXIS - MUSE: 52 DEGREES
P AXIS - MUSE: NORMAL DEGREES
PH UR STRIP: 5.5 [PH] (ref 5–7)
PHOSPHATE SERPL-MCNC: 2.6 MG/DL (ref 2.5–4.5)
PLATELET # BLD AUTO: 289 10E3/UL (ref 150–450)
POTASSIUM SERPL-SCNC: 3.7 MMOL/L (ref 3.4–5.3)
PR INTERVAL - MUSE: 172 MS
PR INTERVAL - MUSE: 174 MS
PR INTERVAL - MUSE: 180 MS
PR INTERVAL - MUSE: 190 MS
PR INTERVAL - MUSE: NORMAL MS
PROT SERPL-MCNC: 6 G/DL (ref 6.4–8.3)
QRS DURATION - MUSE: 88 MS
QRS DURATION - MUSE: 90 MS
QRS DURATION - MUSE: 92 MS
QT - MUSE: 284 MS
QT - MUSE: 348 MS
QT - MUSE: 420 MS
QT - MUSE: 484 MS
QT - MUSE: 490 MS
QTC - MUSE: 401 MS
QTC - MUSE: 446 MS
QTC - MUSE: 481 MS
QTC - MUSE: 511 MS
QTC - MUSE: 551 MS
R AXIS - MUSE: -15 DEGREES
R AXIS - MUSE: -25 DEGREES
R AXIS - MUSE: 11 DEGREES
R AXIS - MUSE: 4 DEGREES
R AXIS - MUSE: 7 DEGREES
RBC # BLD AUTO: 3.35 10E6/UL (ref 4.4–5.9)
RBC URINE: <1 /HPF
SODIUM SERPL-SCNC: 138 MMOL/L (ref 135–145)
SP GR UR STRIP: 1.03 (ref 1–1.03)
SYSTOLIC BLOOD PRESSURE - MUSE: NORMAL MMHG
T AXIS - MUSE: -6 DEGREES
T AXIS - MUSE: 10 DEGREES
T AXIS - MUSE: 13 DEGREES
T AXIS - MUSE: 65 DEGREES
T AXIS - MUSE: 94 DEGREES
UROBILINOGEN UR STRIP-MCNC: NORMAL MG/DL
VENTRICULAR RATE- MUSE: 120 BPM
VENTRICULAR RATE- MUSE: 67 BPM
VENTRICULAR RATE- MUSE: 76 BPM
VENTRICULAR RATE- MUSE: 79 BPM
VENTRICULAR RATE- MUSE: 99 BPM
WBC # BLD AUTO: 11.5 10E3/UL (ref 4–11)
WBC URINE: 3 /HPF

## 2023-11-01 PROCEDURE — 250N000013 HC RX MED GY IP 250 OP 250 PS 637: Performed by: NURSE PRACTITIONER

## 2023-11-01 PROCEDURE — 250N000013 HC RX MED GY IP 250 OP 250 PS 637: Performed by: STUDENT IN AN ORGANIZED HEALTH CARE EDUCATION/TRAINING PROGRAM

## 2023-11-01 PROCEDURE — 83605 ASSAY OF LACTIC ACID: CPT

## 2023-11-01 PROCEDURE — 80053 COMPREHEN METABOLIC PANEL: CPT | Performed by: STUDENT IN AN ORGANIZED HEALTH CARE EDUCATION/TRAINING PROGRAM

## 2023-11-01 PROCEDURE — 250N000011 HC RX IP 250 OP 636: Mod: JZ | Performed by: NURSE PRACTITIONER

## 2023-11-01 PROCEDURE — 99233 SBSQ HOSP IP/OBS HIGH 50: CPT | Performed by: NURSE PRACTITIONER

## 2023-11-01 PROCEDURE — 97535 SELF CARE MNGMENT TRAINING: CPT | Mod: GO

## 2023-11-01 PROCEDURE — 81001 URINALYSIS AUTO W/SCOPE: CPT | Performed by: NURSE PRACTITIONER

## 2023-11-01 PROCEDURE — 85027 COMPLETE CBC AUTOMATED: CPT | Performed by: NURSE PRACTITIONER

## 2023-11-01 PROCEDURE — 83735 ASSAY OF MAGNESIUM: CPT | Performed by: INTERNAL MEDICINE

## 2023-11-01 PROCEDURE — 97162 PT EVAL MOD COMPLEX 30 MIN: CPT | Mod: GP | Performed by: PHYSICAL THERAPIST

## 2023-11-01 PROCEDURE — G1010 CDSM STANSON: HCPCS | Mod: GC | Performed by: RADIOLOGY

## 2023-11-01 PROCEDURE — 84100 ASSAY OF PHOSPHORUS: CPT | Performed by: INTERNAL MEDICINE

## 2023-11-01 PROCEDURE — 120N000003 HC R&B IMCU UMMC

## 2023-11-01 PROCEDURE — G1010 CDSM STANSON: HCPCS

## 2023-11-01 PROCEDURE — 70450 CT HEAD/BRAIN W/O DYE: CPT | Mod: 26 | Performed by: RADIOLOGY

## 2023-11-01 PROCEDURE — 250N000013 HC RX MED GY IP 250 OP 250 PS 637: Performed by: INTERNAL MEDICINE

## 2023-11-01 PROCEDURE — 97530 THERAPEUTIC ACTIVITIES: CPT | Mod: GP | Performed by: PHYSICAL THERAPIST

## 2023-11-01 PROCEDURE — 36415 COLL VENOUS BLD VENIPUNCTURE: CPT

## 2023-11-01 PROCEDURE — 97530 THERAPEUTIC ACTIVITIES: CPT | Mod: GO

## 2023-11-01 PROCEDURE — 97116 GAIT TRAINING THERAPY: CPT | Mod: GP | Performed by: PHYSICAL THERAPIST

## 2023-11-01 PROCEDURE — 93005 ELECTROCARDIOGRAM TRACING: CPT

## 2023-11-01 PROCEDURE — 93010 ELECTROCARDIOGRAM REPORT: CPT | Performed by: INTERNAL MEDICINE

## 2023-11-01 RX ORDER — CLONAZEPAM 0.5 MG/1
0.5 TABLET ORAL 2 TIMES DAILY
Status: DISCONTINUED | OUTPATIENT
Start: 2023-11-01 | End: 2023-11-02

## 2023-11-01 RX ORDER — AMOXICILLIN 250 MG
2 CAPSULE ORAL DAILY
Status: DISCONTINUED | OUTPATIENT
Start: 2023-11-02 | End: 2023-11-03 | Stop reason: HOSPADM

## 2023-11-01 RX ORDER — AMIODARONE HYDROCHLORIDE 200 MG/1
400 TABLET ORAL 2 TIMES DAILY
Status: DISCONTINUED | OUTPATIENT
Start: 2023-11-01 | End: 2023-11-03 | Stop reason: HOSPADM

## 2023-11-01 RX ORDER — POTASSIUM CHLORIDE 750 MG/1
20 TABLET, EXTENDED RELEASE ORAL ONCE
Status: COMPLETED | OUTPATIENT
Start: 2023-11-01 | End: 2023-11-01

## 2023-11-01 RX ORDER — LIDOCAINE 4 G/G
1 PATCH TOPICAL
Status: DISCONTINUED | OUTPATIENT
Start: 2023-11-01 | End: 2023-11-03 | Stop reason: HOSPADM

## 2023-11-01 RX ORDER — POLYETHYLENE GLYCOL 3350 17 G/17G
17 POWDER, FOR SOLUTION ORAL 2 TIMES DAILY PRN
Status: DISCONTINUED | OUTPATIENT
Start: 2023-11-01 | End: 2023-11-03 | Stop reason: HOSPADM

## 2023-11-01 RX ORDER — MAGNESIUM OXIDE 400 MG/1
400 TABLET ORAL EVERY 4 HOURS
Status: COMPLETED | OUTPATIENT
Start: 2023-11-01 | End: 2023-11-01

## 2023-11-01 RX ORDER — AMIODARONE HYDROCHLORIDE 200 MG/1
200 TABLET ORAL DAILY
Status: DISCONTINUED | OUTPATIENT
Start: 2023-11-08 | End: 2023-11-03 | Stop reason: HOSPADM

## 2023-11-01 RX ORDER — QUETIAPINE FUMARATE 25 MG/1
25 TABLET, FILM COATED ORAL AT BEDTIME
Status: DISCONTINUED | OUTPATIENT
Start: 2023-11-01 | End: 2023-11-03 | Stop reason: HOSPADM

## 2023-11-01 RX ORDER — BISACODYL 10 MG
10 SUPPOSITORY, RECTAL RECTAL DAILY PRN
Status: DISCONTINUED | OUTPATIENT
Start: 2023-11-01 | End: 2023-11-03 | Stop reason: HOSPADM

## 2023-11-01 RX ADMIN — APIXABAN 5 MG: 5 TABLET, FILM COATED ORAL at 19:42

## 2023-11-01 RX ADMIN — NICOTINE 1 PATCH: 7 PATCH, EXTENDED RELEASE TRANSDERMAL at 09:11

## 2023-11-01 RX ADMIN — Medication 10 MG: at 19:42

## 2023-11-01 RX ADMIN — MAGNESIUM OXIDE TAB 400 MG (241.3 MG ELEMENTAL MG) 400 MG: 400 (241.3 MG) TAB at 05:14

## 2023-11-01 RX ADMIN — CLONAZEPAM 0.5 MG: 0.5 TABLET ORAL at 09:11

## 2023-11-01 RX ADMIN — CARVEDILOL 3.12 MG: 3.12 TABLET, FILM COATED ORAL at 09:10

## 2023-11-01 RX ADMIN — AMIODARONE HYDROCHLORIDE 400 MG: 200 TABLET ORAL at 09:11

## 2023-11-01 RX ADMIN — CARVEDILOL 3.12 MG: 3.12 TABLET, FILM COATED ORAL at 18:43

## 2023-11-01 RX ADMIN — CEFTRIAXONE SODIUM 1 G: 1 INJECTION, POWDER, FOR SOLUTION INTRAMUSCULAR; INTRAVENOUS at 09:54

## 2023-11-01 RX ADMIN — TICAGRELOR 90 MG: 90 TABLET ORAL at 09:10

## 2023-11-01 RX ADMIN — QUETIAPINE FUMARATE 25 MG: 25 TABLET ORAL at 22:28

## 2023-11-01 RX ADMIN — TICAGRELOR 90 MG: 90 TABLET ORAL at 19:42

## 2023-11-01 RX ADMIN — AMIODARONE HYDROCHLORIDE 400 MG: 200 TABLET ORAL at 19:42

## 2023-11-01 RX ADMIN — POTASSIUM CHLORIDE 20 MEQ: 750 TABLET, EXTENDED RELEASE ORAL at 05:14

## 2023-11-01 RX ADMIN — ATORVASTATIN CALCIUM 80 MG: 80 TABLET, FILM COATED ORAL at 09:11

## 2023-11-01 RX ADMIN — APIXABAN 5 MG: 5 TABLET, FILM COATED ORAL at 09:11

## 2023-11-01 RX ADMIN — MAGNESIUM OXIDE TAB 400 MG (241.3 MG ELEMENTAL MG) 400 MG: 400 (241.3 MG) TAB at 09:11

## 2023-11-01 RX ADMIN — LIDOCAINE 1 PATCH: 4 PATCH TOPICAL at 11:13

## 2023-11-01 ASSESSMENT — ACTIVITIES OF DAILY LIVING (ADL)
ADLS_ACUITY_SCORE: 31

## 2023-11-01 NOTE — PLAN OF CARE
"Goal Outcome Evaluation:                 Outcome Evaluation: Vitals stable, eating is improved, increased movement throughout the shift.  Pt does have increased confusion noted by writer and his wife though.      Problem: Adult Inpatient Plan of Care  Goal: Plan of Care Review  Description: The Plan of Care Review/Shift note should be completed every shift.  The Outcome Evaluation is a brief statement about your assessment that the patient is improving, declining, or no change.  This information will be displayed automatically on your shift  note.  Outcome: Progressing  Flowsheets (Taken 11/1/2023 1838)  Outcome Evaluation: Vitals stable, eating is improved, increased movement throughout the shift.  Pt does have increased confusion noted by writer and his wife though.  Goal: Patient-Specific Goal (Individualized)  Description: You can add care plan individualizations to a care plan. Examples of Individualization might be:  \"Parent requests to be called daily at 9am for status\", \"I have a hard time hearing out of my right ear\", or \"Do not touch me to wake me up as it startles  me\".  Outcome: Progressing  Goal: Absence of Hospital-Acquired Illness or Injury  Outcome: Progressing  Intervention: Identify and Manage Fall Risk  Recent Flowsheet Documentation  Taken 11/1/2023 1600 by Dianna Joshua, RN  Safety Promotion/Fall Prevention:   activity supervised   clutter free environment maintained   increase visualization of patient   lighting adjusted   nonskid shoes/slippers when out of bed   room door open   room near nurse's station   treat underlying cause  Taken 11/1/2023 1200 by Dianna Joshua, RN  Safety Promotion/Fall Prevention:   activity supervised   clutter free environment maintained   increase visualization of patient   lighting adjusted   nonskid shoes/slippers when out of bed   room door open   room near nurse's station   treat underlying cause  Taken 11/1/2023 0800 by Dianna Joshua, RN  Safety Promotion/Fall " Prevention:   activity supervised   clutter free environment maintained   increase visualization of patient   lighting adjusted   nonskid shoes/slippers when out of bed   room door open   room near nurse's station   treat underlying cause  Intervention: Prevent Skin Injury  Recent Flowsheet Documentation  Taken 11/1/2023 1600 by Dianna Joshua RN  Body Position:   position changed independently   turned   supine   weight shifting   legs elevated   heels elevated   foot of bed elevated  Taken 11/1/2023 1522 by Dianna Joshua RN  Body Position: position changed independently  Taken 11/1/2023 1330 by Dianna Joshua RN  Body Position:   position changed independently   weight shifting   sitting up in bed  Taken 11/1/2023 1210 by Dianna Joshua RN  Body Position:   position changed independently   weight shifting  Taken 11/1/2023 1200 by Dianna Joshua RN  Body Position:   position changed independently   turned   supine, legs elevated   weight shifting   legs elevated  Taken 11/1/2023 1020 by Dianna Joshua RN  Body Position: (Pt setting off  the bed alarm multiple times, redirected, but pt is confused and making odd statements.)   position changed independently   sitting up in bed   weight shifting   foot of bed elevated   legs elevated  Taken 11/1/2023 0930 by Dianna Joshua RN  Body Position: position changed independently  Taken 11/1/2023 0920 by Dianna Joshua RN  Body Position: position changed independently  Taken 11/1/2023 0825 by iDanna Joshua RN  Body Position: position changed independently  Taken 11/1/2023 0800 by Dianna Joshua RN  Body Position:   position changed independently   turned   supine, legs elevated   weight shifting   legs elevated  Intervention: Prevent and Manage VTE (Venous Thromboembolism) Risk  Recent Flowsheet Documentation  Taken 11/1/2023 1600 by Dianna Joshua RN  VTE Prevention/Management: SCDs (sequential compression devices) off  Taken 11/1/2023 1200 by Dianna Joshua RN  VTE Prevention/Management: SCDs  (sequential compression devices) off  Taken 11/1/2023 0800 by Dianna Joshua RN  VTE Prevention/Management: SCDs (sequential compression devices) off  Intervention: Prevent Infection  Recent Flowsheet Documentation  Taken 11/1/2023 1600 by Dianna Joshua RN  Infection Prevention:   equipment surfaces disinfected   rest/sleep promoted   single patient room provided   hand hygiene promoted  Taken 11/1/2023 1200 by Dianna Joshua RN  Infection Prevention:   equipment surfaces disinfected   rest/sleep promoted   single patient room provided   hand hygiene promoted  Taken 11/1/2023 0800 by Dianna Joshua RN  Infection Prevention:   equipment surfaces disinfected   rest/sleep promoted   single patient room provided   hand hygiene promoted  Goal: Optimal Comfort and Wellbeing  Outcome: Progressing  Intervention: Provide Person-Centered Care  Recent Flowsheet Documentation  Taken 11/1/2023 1600 by Dianna Joshua RN  Trust Relationship/Rapport: care explained  Taken 11/1/2023 1200 by Dianna Joshua RN  Trust Relationship/Rapport: care explained  Taken 11/1/2023 0800 by Dianna Joshua RN  Trust Relationship/Rapport: care explained  Goal: Readiness for Transition of Care  Outcome: Progressing     Problem: Risk for Delirium  Goal: Optimal Coping  Outcome: Progressing  Intervention: Optimize Psychosocial Adjustment to Delirium  Recent Flowsheet Documentation  Taken 11/1/2023 1600 by Dianna Joshua RN  Supportive Measures:   active listening utilized   counseling provided   relaxation techniques promoted  Taken 11/1/2023 1200 by Dianna Joshua RN  Supportive Measures:   active listening utilized   counseling provided   relaxation techniques promoted  Taken 11/1/2023 0800 by Dianna Joshua RN  Supportive Measures:   active listening utilized   counseling provided   relaxation techniques promoted  Goal: Improved Behavioral Control  Outcome: Progressing  Intervention: Prevent and Manage Agitation  Recent Flowsheet Documentation  Taken 11/1/2023 1600 by  Dianna Joshua RN  Environment Familiarity/Consistency: daily routine followed  Taken 11/1/2023 1200 by Dianna Joshua RN  Environment Familiarity/Consistency: daily routine followed  Taken 11/1/2023 0800 by Dianna Joshua RN  Environment Familiarity/Consistency: daily routine followed  Intervention: Minimize Safety Risk  Recent Flowsheet Documentation  Taken 11/1/2023 1600 by Dianna Joshua RN  Communication Enhancement Strategies: call light answered in person  Enhanced Safety Measures: room near unit station  Trust Relationship/Rapport: care explained  Taken 11/1/2023 1200 by Dianna Joshua RN  Communication Enhancement Strategies: call light answered in person  Enhanced Safety Measures: room near unit station  Trust Relationship/Rapport: care explained  Taken 11/1/2023 0800 by Dianna Joshua RN  Communication Enhancement Strategies: call light answered in person  Enhanced Safety Measures: room near unit station  Trust Relationship/Rapport: care explained  Goal: Improved Attention and Thought Clarity  Outcome: Progressing  Intervention: Maximize Cognitive Function  Recent Flowsheet Documentation  Taken 11/1/2023 1600 by Dianna Joshua RN  Reorientation Measures:   calendar in view   clock in view   glasses use encouraged  Taken 11/1/2023 1200 by Dianna Joshua RN  Reorientation Measures:   calendar in view   clock in view   glasses use encouraged  Taken 11/1/2023 0800 by Dianna Joshua RN  Reorientation Measures:   calendar in view   clock in view   glasses use encouraged  Goal: Improved Sleep  Outcome: Progressing     Problem: Heart Failure  Goal: Optimal Coping  Outcome: Progressing  Intervention: Support Psychosocial Response  Recent Flowsheet Documentation  Taken 11/1/2023 1600 by Dianna Joshua RN  Supportive Measures:   active listening utilized   counseling provided   relaxation techniques promoted  Taken 11/1/2023 1200 by Dianna Joshua RN  Supportive Measures:   active listening utilized   counseling provided   relaxation  techniques promoted  Taken 11/1/2023 0800 by Dianna Joshua RN  Supportive Measures:   active listening utilized   counseling provided   relaxation techniques promoted  Goal: Optimal Cardiac Output  Outcome: Progressing  Intervention: Optimize Cardiac Output  Recent Flowsheet Documentation  Taken 11/1/2023 1600 by Dianna Joshua RN  Environmental Support: calm environment promoted  Taken 11/1/2023 1200 by Dianna Joshua RN  Environmental Support: calm environment promoted  Taken 11/1/2023 0800 by Dianna Joshua RN  Environmental Support: calm environment promoted  Goal: Stable Heart Rate and Rhythm  Outcome: Progressing  Goal: Optimal Functional Ability  Outcome: Progressing  Intervention: Optimize Functional Ability  Recent Flowsheet Documentation  Taken 11/1/2023 1650 by Dianna Joshua RN  Activity Management: (Pt working with occupational therapy)   activity adjusted per tolerance   activity encouraged   ambulated outside room  Taken 11/1/2023 1600 by Dianna Joshua RN  Activity Management:   activity adjusted per tolerance   back to bed  Taken 11/1/2023 1522 by Dianna Joshua RN  Activity Management:   activity adjusted per tolerance   activity encouraged   up in chair  Taken 11/1/2023 1512 by Dianna Joshua RN  Activity Management: (Pt up into the wheelchair)   activity adjusted per tolerance   ambulated in room   other (see comments)  Taken 11/1/2023 1330 by Dianna Joshua RN  Activity Management:   activity adjusted per tolerance   back to bed  Taken 11/1/2023 1210 by Dianna Joshua RN  Activity Management:   activity adjusted per tolerance   activity encouraged   ambulated outside room   up in chair  Taken 11/1/2023 1200 by Dianna Joshua RN  Activity Management:   activity adjusted per tolerance   activity encouraged  Taken 11/1/2023 1020 by Dianna Joshua RN  Activity Management:   activity adjusted per tolerance   activity encouraged   up in chair   back to bed  Taken 11/1/2023 0930 by Dianna Joshua RN  Activity Management:    activity adjusted per tolerance   activity encouraged   ambulated in room   ambulated outside room   up in chair  Taken 11/1/2023 0920 by Dianna Joshua RN  Activity Management: (Pt walking with physical therapy)   activity adjusted per tolerance   activity encouraged   ambulated in room   ambulated outside room  Taken 11/1/2023 0825 by Dianna Joshua RN  Activity Management:   activity adjusted per tolerance   activity encouraged   ambulated in room   up in chair  Taken 11/1/2023 0800 by Dianna Joshua RN  Activity Management:   activity adjusted per tolerance   activity encouraged  Goal: Fluid and Electrolyte Balance  Outcome: Progressing  Goal: Improved Oral Intake  Outcome: Progressing  Goal: Effective Oxygenation and Ventilation  Outcome: Progressing  Intervention: Promote Airway Secretion Clearance  Recent Flowsheet Documentation  Taken 11/1/2023 1650 by Dianna Joshua RN  Activity Management: (Pt working with occupational therapy)   activity adjusted per tolerance   activity encouraged   ambulated outside room  Taken 11/1/2023 1600 by Dianna Joshua RN  Breathing Techniques/Airway Clearance: deep/controlled cough encouraged  Cough And Deep Breathing: done independently per patient  Activity Management:   activity adjusted per tolerance   back to bed  Taken 11/1/2023 1522 by Dianna Joshua RN  Activity Management:   activity adjusted per tolerance   activity encouraged   up in chair  Taken 11/1/2023 1512 by Dianna Joshua RN  Activity Management: (Pt up into the wheelchair)   activity adjusted per tolerance   ambulated in room   other (see comments)  Taken 11/1/2023 1330 by Dianna Joshua RN  Activity Management:   activity adjusted per tolerance   back to bed  Taken 11/1/2023 1210 by Dianna Joshua RN  Activity Management:   activity adjusted per tolerance   activity encouraged   ambulated outside room   up in chair  Taken 11/1/2023 1200 by Dianna Joshua RN  Breathing Techniques/Airway Clearance: deep/controlled cough  encouraged  Cough And Deep Breathing: done independently per patient  Activity Management:   activity adjusted per tolerance   activity encouraged  Taken 11/1/2023 1020 by Dianna Joshua RN  Activity Management:   activity adjusted per tolerance   activity encouraged   up in chair   back to bed  Taken 11/1/2023 0930 by Dianna Joshua RN  Activity Management:   activity adjusted per tolerance   activity encouraged   ambulated in room   ambulated outside room   up in chair  Taken 11/1/2023 0920 by Dianna Joshua RN  Activity Management: (Pt walking with physical therapy)   activity adjusted per tolerance   activity encouraged   ambulated in room   ambulated outside room  Taken 11/1/2023 0825 by Dianna Joshua RN  Activity Management:   activity adjusted per tolerance   activity encouraged   ambulated in room   up in chair  Taken 11/1/2023 0800 by Dianna Joshua RN  Breathing Techniques/Airway Clearance: deep/controlled cough encouraged  Cough And Deep Breathing: done independently per patient  Activity Management:   activity adjusted per tolerance   activity encouraged  Intervention: Optimize Oxygenation and Ventilation  Recent Flowsheet Documentation  Taken 11/1/2023 1600 by Dianna Joshua RN  Airway/Ventilation Management:   calming measures promoted   pulmonary hygiene promoted  Head of Bed (HOB) Positioning: HOB at 60 degrees  Taken 11/1/2023 1522 by Dianna Joshua RN  Head of Bed (HOB) Positioning: HOB at 90 degrees  Taken 11/1/2023 1330 by Dianna Joshua RN  Head of Bed (HOB) Positioning: HOB at 60 degrees  Taken 11/1/2023 1210 by Dianna Joshua RN  Head of Bed (HOB) Positioning: HOB at 90 degrees  Taken 11/1/2023 1200 by Dianna Joshua RN  Airway/Ventilation Management:   calming measures promoted   pulmonary hygiene promoted  Head of Bed (HOB) Positioning: HOB at 30-45 degrees  Taken 11/1/2023 1020 by Dianna Joshua RN  Head of Bed (HOB) Positioning: HOB at 30-45 degrees  Taken 11/1/2023 0930 by Dianna Joshua RN  Head of Bed ([facility])  Positioning: HOB at 90 degrees  Taken 11/1/2023 0825 by Dianna Joshua, RN  Head of Bed (HOB) Positioning: HOB at 90 degrees  Taken 11/1/2023 0800 by Dianna Joshua, RN  Airway/Ventilation Management:   calming measures promoted   pulmonary hygiene promoted  Head of Bed (HOB) Positioning: HOB at 30-45 degrees  Goal: Effective Breathing Pattern During Sleep  Outcome: Progressing  Intervention: Monitor and Manage Obstructive Sleep Apnea  Recent Flowsheet Documentation  Taken 11/1/2023 1600 by Dianna Joshua, RN  Medication Review/Management: medications reviewed  Taken 11/1/2023 1200 by Dianna Joshua, RN  Medication Review/Management: medications reviewed  Taken 11/1/2023 0800 by Dianna Joshua, RN  Medication Review/Management: medications reviewed

## 2023-11-01 NOTE — PROGRESS NOTES
End of shift note:    Neuro: A&Ox4, PERRLA, does have anxiety, using the call light as needed   Cardiac: SR with HR 80's. VSS, but does have an increased temp to 99.3 then 99.7 and the primary temp is updated with no interventions.    Respiratory: Sating 94%-98% on RA. Wean to RA. IS and acapella are encouraged.  GI/: Urine output is low.  BM X 1  Diet/appetite: Tolerates low Na diet but having decreased appetite and refusing breakfast and lunch.   Activity:  Assist of x 1, up to chair and bathroom  Pain: At acceptable level. Denies pain most of the shift and does report some pain to the left side of the chest that is reproducible with palpation and does not radiate.    Skin: No new deficits noted. Dressing remains intact to the right groin  LDA's: PIV x 2 to the right FA, SL    Plan: Continue with POC. Notify primary team with changes.

## 2023-11-01 NOTE — PROGRESS NOTES
Interventional Cardiology Progress Note    Assessment & Plan:  Duane C Johnson is a 73 year old male with a history of HTN, HLD, and tobacco use presented to ED 10/26 with acute chest pain and EKG demonstrating anterior STEMI for which he underwent coronary angigoram and had PCI with BRENDA to pLAD. His course is complicated by subsequent VF/VT cardiac arrest x2 on 10/27/23 for which repeat angio performed revealing patent stents. Patient has now been stable on PO Amio.     Today's Changes:  - Cardiac Rehab  - Wean O2  - decrease frequency lab checks  - start Klonopin     # Anterior STEMI s/p PCI LAD 10/26  # VT/VF arrest on 10/27  # Coronary Artery Disease  # Acute Systolic Heart Failure (EF 20-30%) ischemic cardiomyopathy  # Sternal wall pain 2/2 CPR  # HTN  # HLD  Patient admitted 10/26 with chest pain that radiated to left arm, worse with movement, so came into ED, initial EKG with anterior STEMI. Cath lab activated. Patient s/p PCI pLAD.  LVEDP at that time 26. Post procedure pt briefly on pressors for HOTN, was weaned off with use of Midodrine (now also off). Transferred out of ICU 10/27. Pt also having frequent runs of NSVT, 10/27 evening, went back into sustained VT and cardiac arrest x 2. Patient started on Lidocaine and amiodarone gtt. Lido off 10/28, amio transitioned to PO. Cors repeated, patent stents, no additional intervention needed. Transferred to floor 10/31.    - Most recent Echo 10/30: EF 20-30%, large LAD territory akinesis, normal IVC  - DAPT: Eliquis (given anterior wall akinesis) x 3 months, Brilinta 90 mg BID x 1 month then change to Plavix for 1 year (cost)  - Get a cardiac MRI in 1 month, at that time, pending EF and thrombus, could consider stopping Eliquis and replacing with 81 mg ASA daily  - BB: Coreg 3.125 mg BID  - ACEi/ARB/ARNI: not started due to HOTN  - AA: not started due to HOTN  - SGLT2i: cost prohibitive   - Statin: Lipitor 80 mg daily  - Volume status: euvolemic, no diuretic  "needed at this time  - Rhythm control: Amiodarone 400 mg BID through 11/7 (for 10g load), then reduce to 200 mg daily  - SCD prophy: no ICD needed at this time given VT occurred within 48 hours of PCI, if EF remains less than 35% on GDMT in 3 months, consider referral for ICD  - Start Lidocaine patch for sternal wall pain  - Cardiac Rehab following, prior SW notes state patient uninterested in therapies or TCU at discharge unless \"medically necessary,\" this am, pt seems more open to TCU or home services if recommended  - Daily BMP, Mg, replace lytes per protocol     # Aspiration Pneumonia   # Leukocytosis, improving  # Acute respiratory failure with hypoxia, improving  # Probable TABATHA  WBC peak 14.4. CXR with increased R)lung opacity, pt with productive cough. Started on IV Rocephin for probable aspiration pneumonia. Patient afebrile  - Wean O2 as able  - IV Rocephin started 10/30, continue through 11/3 for pna treatment, can switch to PO Augmentin at time of discharge  - WBC 11.5 (10.7)  - Increased O2 needs at night, likely TABATHA, sleep study referral at time of discharge    # Panic Attacks 2/2 cardiac arrest  # Possible OCD  Psych consulted 10/29, 10/30 per patient request for increased anxiety after cardiac arrest.   - Per psych recs start Klonopin 0.5 mg BID       Prophylaxis:  DVT: PO Eliquis    Diet: Cardiac  Activity: up as tolerated  Code Status: Full  Disposition: possible discharge home tomorrow pending CR recs, stable BP on GDMT    Patient discussed w/ Dr. Braden.      Samara Wren, APRN, CNP  [facility] Structural/Interventional Cardiology   Pager [phone]      Interval History:  - No acute events overnight  - This am, pt reports feeling better this am, endorses some rib and sternal wall pain 2/2 CPR, otherwise denies chest pain  - Currently denies SOB, but reports has fears of getting SOB with activity, and needing to take breaks, states has not ambulated in hallways for several days  - O2 weaned off while in room, " "O2 sats 96% on room air      Most recent vital signs:  /73   Pulse 75   Temp 98.6  F (37  C) (Oral)   Resp 20   Ht 1.676 m (5' 5.98\")   Wt 67.3 kg (148 lb 5.9 oz)   SpO2 95%   BMI 23.96 kg/m    Temp:  [98.3  F (36.8  C)-99.8  F (37.7  C)] 98.6  F (37  C)  Pulse:  [75-90] 75  Resp:  [11-28] 20  BP: ()/(61-81) 102/73  Cuff Mean (mmHg):  [82] 82  SpO2:  [90 %-98 %] 95 %  Wt Readings from Last 2 Encounters:   11/01/23 67.3 kg (148 lb 5.9 oz)   10/09/23 67.1 kg (148 lb)       Intake/Output Summary (Last 24 hours) at 11/1/2023 0753  Last data filed at 10/31/2023 2100  Gross per 24 hour   Intake 1240 ml   Output 400 ml   Net 840 ml       Physical exam:  General: In bed, in NAD  HEENT: EOMI, PERRLA, no scleral icterus or injection  CARDIAC: RRR, no m/r/g appreciated. Peripheral pulses 2+  RESP: CTAB, no wheezes, rhonchi or crackles appreciated.  GI: NABS, NT/ND, no guarding or rebound  EXTREMITIES: NO LE edema, pulses 2+. Radial access site CDI, soft, non-tender to touch, mild bruising around site, no swelling, no hematoma. Femoral access site dressing in place, CDI, soft, non-tender to touch, no swelling around site, no bruising, no signs of hematoma.   SKIN: No acute lesions appreciated; scattered bruising on BUE  NEURO: Alert and oriented X3    Labs (Past three days):  CBC  Recent Labs   Lab 11/01/23  0356 10/31/23  0431 10/30/23  0407 10/29/23  0623   WBC 11.5* 10.7 14.0* 16.5*   RBC 3.35* 3.48* 3.46* 3.52*   HGB 10.9* 11.4* 11.3* 11.3*   HCT 32.1* 33.4* 33.2* 34.1*   MCV 96 96 96 97   MCH 32.5 32.8 32.7 32.1   MCHC 34.0 34.1 34.0 33.1   RDW 13.2 13.1 13.2 13.2    270 263 236     BMP  Recent Labs   Lab 11/01/23  0356 10/31/23  1632 10/31/23  0431 10/30/23  1833 10/30/23  1550 10/30/23  0407 10/29/23  1341 10/29/23  0348 10/27/23  2209 10/27/23  1944    139 141  --  141 139   < > 139   < > 136   POTASSIUM 3.7 3.9 3.9  --  3.8 3.8   < > 3.9   < > 4.1   CHLORIDE 105 102 105  --  103 104   < " "> 104   < > 100   CO2 23 23 25  --  23 24   < > 21*   < > 24   ANIONGAP 10 14 11  --  15 11   < > 14   < > 12   * 81 92 110* 114* 112*   < > 115*   < > 120*   BUN 34.2* 31.3* 31.3*  --  32.5* 34.7*   < > 31.4*   < > 18.3   CR 0.88 0.96 1.05  --  0.98 1.08   < > 1.05   < > 1.00   GFRESTIMATED >90 83 75  --  81 72   < > 75   < > 79   PRANAV 8.4* 8.7* 8.6*  --  8.9 8.6*   < > 8.7*   < > 9.0   MAG 2.0  --  2.0  --   --  1.9  --  2.1   < > 2.3   PHOS 2.6  --   --   --   --  1.7*  --  4.5  --  3.1    < > = values in this interval not displayed.     Troponins:   Lab Results   Component Value Date    CTROPT 6,006 () 10/28/2023    CTROPT 7,354 () 10/27/2023    CTROPT 8,255 () 10/27/2023    CTROPT 7,637 () 10/27/2023    CTROPT 8,624 () 10/27/2023        INR  Recent Labs   Lab 10/27/23  1943 10/26/23  0059   INR 1.23* 1.10     Liver panel  Recent Labs   Lab 11/01/23  0356 10/31/23  1632 10/31/23  0431 10/30/23  1550   PROTTOTAL 6.0* 6.6 6.3* 6.9   ALBUMIN 2.8* 3.2* 3.1* 3.4*   BILITOTAL 0.7 1.1 0.8 0.8   ALKPHOS 72 73 71 78   AST 72* 64* 67* 81*   ALT 44 42 41 45       Troponin T High Sensitivity No results found for: \"TROPI\", \"TROPONIN\", \"TROPR\", \"TROPN\"    Imaging/procedure results:  EKG 12 Lead 11/1/2023: NSR HR 85       ECHO:  Interpretation Summary  Ischemic CM. Large LAD MI. Left ventricular function is decreased. The  ejection fraction is 20-30% (severely reduced).  Global right ventricular function is normal.  No significant valvular abnormalities present.  IVC diameter <2.1 cm collapsing >50% with sniff suggests a normal RA pressure  of 3 mmHg.  No pericardial effusion is present.  No significant changes noted.    Coronary Angiogram 10/26/2023:  Comments/Patient Narrative  74 y/o M with PMH of HTN, HLD, prostate Ca presents with anterolateral STEMI.     Pre Procedure Diagnosis  STEMI       Conclusion      1st Mrg lesion is 20% stenosed.    Prox LAD to Mid LAD lesion is 100% stenosed.    1st Diag-1 lesion " is 80% stenosed.    1st Diag-2 lesion is 50% stenosed.    Dist LAD lesion is 100% stenosed.    RPDA lesion is 70% stenosed.    Dist RCA lesion is 40% stenosed.     Anterior STEMI  Two vessel obstructive CAD (100% pLAD occlusion, 70% rPDA stenosis, 80% diagonal 1 stenosis)  PCI of pLAD to mLAD with BRENDA x 1 (Synergy 2.50x 28 mm) post dilated to 2.75 vessel  CVC placement in RIJ  LVEDP of 26 mmHg  Hemostasis of RRA with TR band      Coronary Findings  Diagnostic  Dominance: Right  Left Main   The vessel is large and is angiographically normal.      Left Anterior Descending   The vessel is large.   Prox LAD to Mid LAD lesion is 100% stenosed.   Dist LAD lesion is 100% stenosed.      First Diagonal Branch   1st Diag-1 lesion is 80% stenosed.   1st Diag-2 lesion is 50% stenosed.      Lateral First Diagonal Branch   The vessel is moderate in size. The vessel exhibits minimal luminal irregularities.      Left Circumflex   The vessel is large.      First Obtuse Marginal Branch   The vessel is moderate in size and is angiographically normal.   1st Mrg lesion is 20% stenosed.      Second Obtuse Marginal Branch   The vessel is moderate in size and is angiographically normal.      Third Obtuse Marginal Branch   The vessel is small and is angiographically normal.      Right Coronary Artery   The vessel is large.   Dist RCA lesion is 40% stenosed.      Right Posterior Descending Artery   The vessel is large. The vessel exhibits minimal luminal irregularities.   RPDA lesion is 70% stenosed.      First Right Posterolateral Branch   The vessel is large. The vessel exhibits minimal luminal irregularities.      Second Right Posterolateral Branch   The vessel is large. The vessel exhibits minimal luminal irregularities.         Intervention   Prox LAD to Mid LAD lesion   Stent   Lesion length: 20 mm. There is no pre-interventional antegrade distal flow (RAIZA 0). A stent was successfully placed. The post-interventional distal flow is  normal (RAIZA 3). Left main was engaged with a 6 Fr XB 3.0 GC. Heparin was given to maintain ACT > 250 s throughout the procedure. A 0.014''  50 wire was advanced across the p LAD occlusion to the diagonal 1. pLAD was predilated with an Emerge 2.50x8mm and an Emerge 2.50x20 mm balloon. A 0.014'' Runthrough wire was advanced to the distal LAD. Ostium of diagonal 1 was predilated with an Emerge 2.50x8 mm balloon.  50 wire was removed and pLAD to mLAD was stented with a Synergy 2.24p75uw BRENDA. BRENDA was post dilated with an NC Emerge 2.75x12 mm balloon. Post intervention angiogram showed no dissection or other complication.   There is a 0% residual stenosis post intervention.           Hemodynamics  LVEDP of 26 mmHg Left ventricular filling pressures are moderately elevated. Hemodynamic data has been modified in [facility] per physician review.     Coronary Angiogram 10/27/2023:  Coronary Findings    Diagnostic  Dominance: Right  Left Main   The vessel was visualized by selective angiography and is moderate in size. There was 0% vessel disease. Pressure wire/FFR was not performed on the vessel. IVUS was not performed on the vessel.      Left Anterior Descending   The vessel was visualized by selective angiography. There was 20% calcified vessel disease.      Ramus Intermedius   The vessel was visualized by selective angiography and is moderate in size. There was 0% vessel disease.      Left Circumflex   The vessel was visualized by selective angiography and is moderate in size. There was 15% vessel disease.      Right Coronary Artery   The vessel was visualized by selective angiography. There was 20% vessel disease.      Intervention     No interventions have been documented.       Medical Decision Making   50 MINUTES SPENT BY ME on the date of service doing chart review, history, exam, documentation & further activities per the note.

## 2023-11-01 NOTE — CONSULTS
"73 year old male presenting with STEMI/VT arrest. CORE consult requested. Duane is currently admitted with STEMI    Duane was provided with a CHF book.  I reviewed the importance of daily weights, 2 gm sodium diet, 2L fluid restriction and compliance with medications upon hospital discharge.  CORE contact phone numbers provided and patient is encouraged to call with any questions or concerns, including any weight gain or loss of 2 or more pounds in 24 hours or 5 or more pounds in 1 week.      Duane was a bit confused during the meeting; Called pt wife Melissa and left VM with appt date/time and asked that she call back to confirm appt. Message sent to Chelsie Bocanegra to get pt in with [facility] as well.     Appointment made in CORE clinic on Jody Medina at 2pm with labs prior.   I will follow-up with the patient at that time, sooner if needed.  Thank you for the consult.    Eduarda Jeronimo RN   Cardiology Care Coordinator [facility]  Questions and [phone]  First press #1 for the [employer] and then press #3 for \"Medical Advise\" to reach us Cardiology Nurses.     "

## 2023-11-01 NOTE — PROGRESS NOTES
End of shift note:    Events:  Pt developed increased confusion during the shift. Provider Nayely Wren NP is updated at 1329 and she orders a CT of the head, glucose check (which was WNL), and an UA (which pt has not been able to void yet for). Pt's wife states that he is slurring his speech, but writer does not appreciate this.  Writer does note that the pt is impulsive, confused with his sentences when questions are asked.      Neuro: A&Ox4, PERRLA, does have anxiety, using the call light as needed  Cardiac: SR with sl ST elevation HR 80's. VSS   Respiratory: Sating 94%-98% on RA. Wean to RA. IS and acapella are encouraged  GI/: Pt voiding at the beginning of the shift and then not since. Bladder scan performed.  Large BM with void at the beginning of the shift. Pt needing to have a UA collected.   Diet/appetite: Tolerates low Na diet but having decreased appetite and taking all day to eat his breakfast tray.  Activity:  Assist of x 1, up to chair and ambulating in the hallway.  Walking x 3 in the hallway with staff assist x 1 and tolerating well with some dyspnea.   Pain: Denies any pain throughout the shift.  Lidocaine patch applied to the left chest.   Skin: No new deficits noted. Scattered bruising.   LDA's: PIV x 1 to the right FA, SL    Plan: Continue with POC. Notify primary team with changes.

## 2023-11-01 NOTE — PLAN OF CARE
Patient arrived from  via ICU bed without incident.  Two RN skin assessment completed by myself and Jacqui Weiner RN. Right groin site, scattered bruising, and a slightly reddened coccyx that was blanchable and had a mepilex on it.

## 2023-11-01 NOTE — PLAN OF CARE
ICU End of Shift Summary. See flowsheets for vital signs and detailed assessment.    Major Changes: Transferred to  at 0600, belongings sent with pt    Neuro: Aox4, TORO, follows commands, PERRLA, some soreness from CPR  Cardiac: SR 80-90, BP stable, tmax 99.5  Respiratory: 2L NC overnight, lungs clear, dry cough  GI/: Voids spontaneously, stool softeners held per pt request  Skin: No changes, redness on sacrum  Drips: NA  Labs: Replaced Mg/K    Plan: Continue plan of care      BLADE Jose  November 1, 2023        Goal Outcome Evaluation:      Plan of Care Reviewed With: patient    Overall Patient Progress: improvingOverall Patient Progress: improving    Outcome Evaluation: VSS, awaiting transfer

## 2023-11-01 NOTE — PROVIDER NOTIFICATION
Brief Cardiology Progress Note    RN called to notify patient having increased confusion, weakness this afternoon. Wife had also reported some slurred speech.     At bedside, no slurred speech, no obvious neuro deficits. Per wife, confusion is already improving. Patient did report prior hallucinations, feeling very tired, and agreed he noticed some more confusion. Was at times referencing hospitalization 40 years ago (per wife when he broke his leg).     We did start klonopin this am for panic attacks, does have common reaction of confusion. However, after talking with wife and patient, more likely delirium due to lack of sleep.     - Will check Head CT to rule out CVA given prior reported slurred speech  - UA  - Melatonin and Seroquel at bedtime  - Hold Klonopin for now  - If worsens, would re involve psychiatry     NADEGE Munoz, CNP  [facility]

## 2023-11-01 NOTE — PROGRESS NOTES
CLINICAL NUTRITION SERVICES    Reason for Assessment:  Heart-healthy nutrition education, received consult previously.     Diet History:  Pt not known to this service PTA.     Pt reports no history of receiving heart-healthy nutrition education in the past. Pt and wife in room today. Per pt, he does the majority of the cooking while his wife shops. Pt likes to watch YouTube videos for recipe ideas, and then he modifies them. Uses salt, but not in all of his cooking. Generally avoids processed foods. Uses olive oil and coconut oil. Agreeable to nutrition education.     Nutrition Diagnosis:  Food- and nutrition-related knowledge deficit r/t no previous knowledge of heart-healthy diet AEB pt report of no previous formal heart-healthy nutrition education.    Nutrition Prescription/Recs:  Continue heart-healthy diet.      Interventions:  Nutrition Education: Provided verbal instruction on a heart-healthy diet. Discussed the various types of fats (preference for olive oil over coconut oil), limiting sodium, eating adequate fruits and vegetables, limiting simple carbohydrate (and sweets), choosing whole grains, and keeping a healthy weight. Emphasized low-sodium eating and rationale. Rec avoid adding salt and instead use herbs and spices. Explained classifications of low-sodium and high-sodium foods when reading labels. Answered questions about ideal wt (pt's ideal body wt is 64.5 kg). Provided handouts: How to Read Nutrition Labels and Nutrition Care Manual Handouts on Heart-Healthy Nutrition Therapy.     Goals:    Pt will list at least two interventions to make current meal plan more heart-healthy.     Follow-up:   Patient to ask any further nutrition-related questions before discharge. In addition, pt may request outpatient RD appointment.     Opal Esposito, MS, RD, LD, [facility]   6C (beds [id number] and [id number]) RD pgr: [phone]  Saturday/Sunday/Holiday RD pgr: [phone]

## 2023-11-01 NOTE — PROGRESS NOTES
Notified NP at 1329 PM regarding  Pt having confusing and wife concerned as she feels he is having slurred speech to him .      Spoke with: Nayely Wren NP      Orders were obtained.    Comments: Nayely Wren NP is updated with pt being able to answer orientation questions this am and afternoon during assessment, but then being confused with answering other questions with odd answers.  Pt's wife stating that she feels the pt is slurring his speech, which the writer does not appreciate.     CT of the head to be ordered. UA ordered. Glucose ordered

## 2023-11-01 NOTE — PHARMACY-CONSULT NOTE
Pharmacy Delirium Chart Review    Upon chart review, the following medications may contribute to possible patient delirium: clonazepam (unsure of when delirium started as first dose was at 9am today).  No other medications that patient is taking have significant delirium side effects. Please consult unit pharmacist with further questions.    Eve Urias RPH

## 2023-11-01 NOTE — PLAN OF CARE
"Goal Outcome Evaluation:                 Outcome Evaluation: Pt meeting goals; VSS; Up in the chair throughout the shift; PT/OT evaluation; Poor appetite and intake.        Problem: Adult Inpatient Plan of Care  Goal: Plan of Care Review  Description: The Plan of Care Review/Shift note should be completed every shift.  The Outcome Evaluation is a brief statement about your assessment that the patient is improving, declining, or no change.  This information will be displayed automatically on your shift  note.  Outcome: Progressing  Flowsheets (Taken 10/31/2023 1958)  Outcome Evaluation:   Pt meeting goals   VSS   Up in the chair throughout the shift   PT/OT evaluation   Poor appetite and intake.  Goal: Patient-Specific Goal (Individualized)  Description: You can add care plan individualizations to a care plan. Examples of Individualization might be:  \"Parent requests to be called daily at 9am for status\", \"I have a hard time hearing out of my right ear\", or \"Do not touch me to wake me up as it startles  me\".  Outcome: Progressing  Goal: Absence of Hospital-Acquired Illness or Injury  Outcome: Progressing  Intervention: Identify and Manage Fall Risk  Recent Flowsheet Documentation  Taken 10/31/2023 1600 by Dianna Joshua, RN  Safety Promotion/Fall Prevention:   activity supervised   clutter free environment maintained   increase visualization of patient   lighting adjusted   nonskid shoes/slippers when out of bed   room door open   room near nurse's station   treat underlying cause  Taken 10/31/2023 1200 by Dianna Joshua, RN  Safety Promotion/Fall Prevention:   activity supervised   clutter free environment maintained   increase visualization of patient   lighting adjusted   nonskid shoes/slippers when out of bed   room door open   room near nurse's station   treat underlying cause  Intervention: Prevent Skin Injury  Recent Flowsheet Documentation  Taken 10/31/2023 1700 by Dianna Joshua, RN  Body Position:   position changed " independently   weight shifting   upper extremity elevated   heels elevated   legs elevated  Taken 10/31/2023 1500 by Dianna Joshua RN  Body Position:   position changed independently   heels elevated   legs elevated  Taken 10/31/2023 1300 by Dianna Joshua RN  Body Position:   position changed independently   weight shifting   legs elevated   heels elevated  Taken 10/31/2023 1120 by Dianna Joshua RN  Body Position:   position changed independently   weight shifting   upper extremity elevated   heels elevated   legs elevated   foot of bed elevated  Taken 10/31/2023 1000 by Dianna Joshua RN  Body Position:   position changed independently   supine   weight shifting   legs elevated   heels elevated   foot of bed elevated  Intervention: Prevent and Manage VTE (Venous Thromboembolism) Risk  Recent Flowsheet Documentation  Taken 10/31/2023 1600 by Dianna Joshua RN  VTE Prevention/Management:   SCDs (sequential compression devices) off   patient refused intervention  Taken 10/31/2023 1200 by Dianna Joshua RN  VTE Prevention/Management:   SCDs (sequential compression devices) off   patient refused intervention  Intervention: Prevent Infection  Recent Flowsheet Documentation  Taken 10/31/2023 1600 by Dianna Joshua RN  Infection Prevention:   equipment surfaces disinfected   rest/sleep promoted   single patient room provided   hand hygiene promoted  Taken 10/31/2023 1200 by Dianna Joshua RN  Infection Prevention:   equipment surfaces disinfected   rest/sleep promoted   single patient room provided   hand hygiene promoted  Goal: Optimal Comfort and Wellbeing  Outcome: Progressing  Intervention: Provide Person-Centered Care  Recent Flowsheet Documentation  Taken 10/31/2023 1600 by Dianna Joshua RN  Trust Relationship/Rapport:   care explained   choices provided   emotional support provided   empathic listening provided   questions answered   questions encouraged   reassurance provided   thoughts/feelings acknowledged  Taken 10/31/2023  1200 by Dianna Joshua RN  Trust Relationship/Rapport:   care explained   choices provided   emotional support provided   empathic listening provided   questions answered   questions encouraged   reassurance provided   thoughts/feelings acknowledged  Goal: Readiness for Transition of Care  Outcome: Progressing     Problem: Risk for Delirium  Goal: Optimal Coping  Outcome: Progressing  Intervention: Optimize Psychosocial Adjustment to Delirium  Recent Flowsheet Documentation  Taken 10/31/2023 1600 by Dianna Joshua RN  Supportive Measures:   active listening utilized   counseling provided   relaxation techniques promoted  Taken 10/31/2023 1200 by Dianna Joshua RN  Supportive Measures:   active listening utilized   counseling provided   relaxation techniques promoted  Goal: Improved Behavioral Control  Outcome: Progressing  Intervention: Prevent and Manage Agitation  Recent Flowsheet Documentation  Taken 10/31/2023 1600 by Dianna Joshua RN  Environment Familiarity/Consistency: daily routine followed  Taken 10/31/2023 1200 by Dianna Joshua RN  Environment Familiarity/Consistency: daily routine followed  Intervention: Minimize Safety Risk  Recent Flowsheet Documentation  Taken 10/31/2023 1600 by Dianna Joshua RN  Communication Enhancement Strategies: call light answered in person  Enhanced Safety Measures: room near unit station  Trust Relationship/Rapport:   care explained   choices provided   emotional support provided   empathic listening provided   questions answered   questions encouraged   reassurance provided   thoughts/feelings acknowledged  Taken 10/31/2023 1200 by Dianna Joshua RN  Communication Enhancement Strategies: call light answered in person  Enhanced Safety Measures: room near unit station  Trust Relationship/Rapport:   care explained   choices provided   emotional support provided   empathic listening provided   questions answered   questions encouraged   reassurance provided   thoughts/feelings  acknowledged  Goal: Improved Attention and Thought Clarity  Outcome: Progressing  Intervention: Maximize Cognitive Function  Recent Flowsheet Documentation  Taken 10/31/2023 1600 by Dianna Joshua RN  Reorientation Measures:   calendar in view   clock in view   glasses use encouraged  Taken 10/31/2023 1200 by Dianna Joshua RN  Reorientation Measures:   calendar in view   clock in view   glasses use encouraged  Goal: Improved Sleep  Outcome: Progressing     Problem: Heart Failure  Goal: Optimal Coping  Outcome: Progressing  Intervention: Support Psychosocial Response  Recent Flowsheet Documentation  Taken 10/31/2023 1600 by Dianna Joshua RN  Supportive Measures:   active listening utilized   counseling provided   relaxation techniques promoted  Taken 10/31/2023 1200 by Dianna Joshua RN  Supportive Measures:   active listening utilized   counseling provided   relaxation techniques promoted  Goal: Optimal Cardiac Output  Outcome: Progressing  Intervention: Optimize Cardiac Output  Recent Flowsheet Documentation  Taken 10/31/2023 1600 by Dianna Joshua RN  Environmental Support: calm environment promoted  Taken 10/31/2023 1200 by Dianna Joshua RN  Environmental Support: calm environment promoted  Goal: Stable Heart Rate and Rhythm  Outcome: Progressing  Goal: Optimal Functional Ability  Outcome: Progressing  Intervention: Optimize Functional Ability  Recent Flowsheet Documentation  Taken 10/31/2023 1700 by Dianna Joshua RN  Activity Management:   activity adjusted per tolerance   activity encouraged   up in chair  Taken 10/31/2023 1500 by Dianna Joshua RN  Activity Management:   activity adjusted per tolerance   activity encouraged   up in chair  Taken 10/31/2023 1300 by Dianna Joshua RN  Activity Management:   activity adjusted per tolerance   activity encouraged   up in chair  Taken 10/31/2023 1120 by Dianna Joshua RN  Activity Management:   activity adjusted per tolerance   activity encouraged   ambulated in room   ambulated to  bathroom   up in chair  Taken 10/31/2023 1000 by Dianna Joshua RN  Activity Management:   activity adjusted per tolerance   activity encouraged  Goal: Fluid and Electrolyte Balance  Outcome: Progressing  Goal: Improved Oral Intake  Outcome: Progressing  Goal: Effective Oxygenation and Ventilation  Outcome: Progressing  Intervention: Promote Airway Secretion Clearance  Recent Flowsheet Documentation  Taken 10/31/2023 1700 by Dianna Joshua RN  Activity Management:   activity adjusted per tolerance   activity encouraged   up in chair  Taken 10/31/2023 1600 by Dianna Joshua RN  Breathing Techniques/Airway Clearance: deep/controlled cough encouraged  Cough And Deep Breathing: done independently per patient  Taken 10/31/2023 1500 by Dianna Joshua RN  Activity Management:   activity adjusted per tolerance   activity encouraged   up in chair  Taken 10/31/2023 1300 by Dianna Joshua RN  Activity Management:   activity adjusted per tolerance   activity encouraged   up in chair  Taken 10/31/2023 1200 by Dianna Joshua RN  Breathing Techniques/Airway Clearance: deep/controlled cough encouraged  Cough And Deep Breathing: done independently per patient  Taken 10/31/2023 1120 by Dianna Joshua RN  Activity Management:   activity adjusted per tolerance   activity encouraged   ambulated in room   ambulated to bathroom   up in chair  Taken 10/31/2023 1000 by Dianna Joshua RN  Activity Management:   activity adjusted per tolerance   activity encouraged  Intervention: Optimize Oxygenation and Ventilation  Recent Flowsheet Documentation  Taken 10/31/2023 1700 by Dianna Joshua RN  Head of Bed (HOB) Positioning: HOB at 60-90 degrees  Taken 10/31/2023 1600 by Dianna Joshua RN  Airway/Ventilation Management:   calming measures promoted   pulmonary hygiene promoted  Taken 10/31/2023 1500 by Dianna Joshua RN  Head of Bed (HOB) Positioning: HOB at 60-90 degrees  Taken 10/31/2023 1300 by Dianna Joshua RN  Head of Bed (HOB) Positioning: HOB at 60-90  degrees  Taken 10/31/2023 1200 by Dianna Joshua RN  Airway/Ventilation Management:   calming measures promoted   pulmonary hygiene promoted  Taken 10/31/2023 1120 by Dianna Joshua RN  Head of Bed (HOB) Positioning: HOB at 60-90 degrees  Taken 10/31/2023 1000 by Dianna Joshua RN  Head of Bed (HOB) Positioning: HOB at 30-45 degrees  Goal: Effective Breathing Pattern During Sleep  Outcome: Progressing  Intervention: Monitor and Manage Obstructive Sleep Apnea  Recent Flowsheet Documentation  Taken 10/31/2023 1600 by Dianna Joshua RN  Medication Review/Management: medications reviewed  Taken 10/31/2023 1200 by Dianna Joshua RN  Medication Review/Management: medications reviewed

## 2023-11-01 NOTE — DISCHARGE SUMMARY
[address] [address]  p: [phone]    Discharge Summary: Cardiology Service    Duane C Johnson MRN# [id number]   YOB: 1950 Age: 73 year old       Admission Date: 10/26/2023  Discharge Date: 11/03/2023    Discharge Diagnoses:  # Anterior STEMI s/p PCI LAD 10/26  # VT/VF arrest on 10/27  # Coronary Artery Disease  # Acute Systolic Heart Failure (EF 20-30%) ischemic cardiomyopathy  # Sternal wall pain 2/2 CPR  # HTN  # HLD  # Aspiration Pneumonia   # Leukocytosis, resolved  # Acute respiratory failure with hypoxia, resolved  # Probable TABATHA  # Panic Attacks 2/2 cardiac arrest  # Possible OCD    Brief HPI:  Duane C Johnson is a 73 year old male with a history of HTN, HLD, and tobacco use presented to ED 10/26 with acute chest pain and EKG demonstrating anterior STEMI for which he underwent coronary angigoram and had PCI with BRENDA to pLAD. His course is complicated by subsequent VF/VT cardiac arrest x2 on 10/27/23 for which repeat angio performed revealing patent stents. Patient has now been stable on PO Amio     Hospital Course by Diagnosis:  # Anterior STEMI s/p PCI LAD 10/26  # VT/VF arrest on 10/27  # Coronary Artery Disease  # Acute Systolic Heart Failure (EF 20-30%) ischemic cardiomyopathy  # Sternal wall pain 2/2 CPR  # HTN  # HLD  Patient admitted 10/26 with chest pain that radiated to left arm, worse with movement, so came into ED, initial EKG with anterior STEMI. Cath lab activated. Patient s/p PCI pLAD.  LVEDP at that time 26. Post procedure pt briefly on pressors for HOTN, was weaned off with use of Midodrine (now also off). Transferred out of ICU 10/27. Pt also having frequent runs of NSVT, 10/27 evening, went back into sustained VT and cardiac arrest x 2. Patient started on Lidocaine and amiodarone gtt. Lido off 10/28, amio transitioned to PO. Cors repeated, patent stents, no additional intervention needed. Transferred to  floor 10/31.    - Most recent Echo 10/30: EF 20-30%, large LAD territory akinesis, normal IVC  - DAPT: Eliquis (given anterior wall akinesis) x 3 months, Brilinta 90 mg BID x 1 month then change to Plavix for 1 year (cost)  - Cardiac MRI in 1 month, pending EF and no thrombus, consider stopping Eliquis and replacing with 81 mg ASA daily  - BB: Toprol 25 mg daily  - ACEi/ARB/ARNI: Losartan 12.5 mg every evening  - AA: not started due to HOTN; consider starting as OP  - SGLT2i: cost prohibitive   - Statin: Lipitor 80 mg daily  - Volume status: euvolemic, no diuretic needed at this time  - Rhythm control: Amiodarone 400 mg BID through 11/7 (for 10g load), then reduce to 200 mg daily  - SCD prophylaxis: no ICD needed at this time given VT occurred within 48 hours of PCI, if EF remains less than 35% on GDMT in 3 months, consider referral for ICD  - Cardiology follow up 1 week     # Aspiration Pneumonia   # Leukocytosis, resolved  # Acute respiratory failure with hypoxia, resolved  # Probable TABATHA  WBC peak 14.4. CXR with increased R)lung opacity, pt with productive cough. Started on IV Rocephin for probable aspiration pneumonia. Patient afebrile. Patient likely has TABATHA given O2 needs at night, during daytime O2 sats > 94% on room air  - IV Rocephin started 10/30, continued through 11/3 for pna treatment  - WBC 9.9 on day of discharge  - Increased O2 needs at night, likely TABATHA, sleep study referral at time of discharge     # Panic Attacks 2/2 cardiac arrest  # Possible OCD  Psych consulted 10/29, 10/30 per patient request for increased anxiety after cardiac arrest.   - Per psych recs start Klonopin 0.5 mg BID     # Acute on Chronic Anemia  - No overt signs of bleeding  - Hgb 9.5    Pertinent Procedures:  1. Coronary Angiogram 10/26 PCI LAD  2. Coronary Angiogram 10/27 no intervention    Consults:  PT/OT    Medication Changes:  START Eliquis 5 mg BID  START Brilinta 90 mg BID  START Toprol 25 mg daily  START Losartan 12.5  mg daily  START Amiodarone 400 mg BID through 11/7, then decrease to 200 mg daily  START Melatonin 10 mg at bedtime  INCREASE Lipitor 80 mg daily  STOP Norvasc 5 mg daily  STOP 81 mg ASA daily    Discharge medications:   Current Discharge Medication List        START taking these medications    Details   !! amiodarone (PACERONE) 200 MG tablet Take 1 tablet (200 mg) by mouth daily    Associated Diagnoses: Cardiac arrest (H)      !! amiodarone (PACERONE) 400 MG tablet Take 1 tablet (400 mg) by mouth 2 times daily for 4 days    Associated Diagnoses: Cardiac arrest (H)      apixaban ANTICOAGULANT (ELIQUIS) 5 MG tablet Take 1 tablet (5 mg) by mouth 2 times daily    Associated Diagnoses: ST elevation myocardial infarction involving left anterior descending (LAD) coronary artery (H); Acute systolic heart failure (H)      losartan (COZAAR) 25 MG tablet Take 0.5 tablets (12.5 mg) by mouth every evening    Associated Diagnoses: Acute systolic heart failure (H)      melatonin 10 MG TABS tablet Take 1 tablet (10 mg) by mouth at bedtime    Associated Diagnoses: Insomnia, unspecified type      metoprolol succinate ER (TOPROL XL) 25 MG 24 hr tablet Take 1 tablet (25 mg) by mouth daily    Associated Diagnoses: Acute systolic heart failure (H)      ticagrelor (BRILINTA) 90 MG tablet Take 1 tablet (90 mg) by mouth every 12 hours    Associated Diagnoses: ST elevation myocardial infarction involving left anterior descending (LAD) coronary artery (H)       !! - Potential duplicate medications found. Please discuss with provider.        CONTINUE these medications which have CHANGED    Details   atorvastatin (LIPITOR) 80 MG tablet Take 1 tablet (80 mg) by mouth daily  Qty: 90 tablet, Refills: 3    Associated Diagnoses: ST elevation myocardial infarction involving left anterior descending (LAD) coronary artery (H)           CONTINUE these medications which have NOT CHANGED    Details   cetirizine (ZYRTEC) 10 MG tablet Take 10 mg by mouth  daily      fish oil-omega-3 fatty acids 1000 MG capsule Take 1 g by mouth 2 times daily Also contains another supplement      multivitamin w/minerals (THERA-VIT-M) tablet Take 1 tablet by mouth daily      vitamin C (ASCORBIC ACID) 1000 MG TABS Take 1,000 mg by mouth           STOP taking these medications       amLODIPine (NORVASC) 5 MG tablet Comments:   Reason for Stopping:         aspirin (ASA) 81 MG EC tablet Comments:   Reason for Stopping:               Follow-up:  - PCP 7-10 days for post hospitalization visit  - Cardiology [facility] 1 week for STEMI follow up  - [facility] 2-3 weeks to establish care for HFrEF  - Sleep Medicine Referral     Labs or imaging requiring follow-up after discharge:  cMRI in 1 month    Code status:  Full    Condition on discharge  Temp:  [98  F (36.7  C)-99.4  F (37.4  C)] 99.2  F (37.3  C)  Pulse:  [64-81] 66  Resp:  [15-24] 19  BP: ()/(59-70) 99/66  SpO2:  [93 %-99 %] 99 %    General: Alert, interactive, NAD  Eyes: sclera anicteric, EOMI  Neck: no JVD, carotid 2+ bilaterally  Cardiovascular: regular rate and rhythm, normal S1 and S2, no murmurs, gallops, or rubs  Resp: clear to auscultation bilaterally, no rales, wheezes, or rhonchi  GI: Soft, nontender, nondistended. +BS.  No HSM or masses, no rebound or guarding.  Extremities: no edema, no cyanosis or clubbing, dorsalis pedis and posterior tibialis pulses 2+ bilaterally, right radial and right femoral access site CDI, soft, non-tender to touch, no swelling, no bruising  Skin: Warm and dry, no jaundice or rash; scattered bruising from IV lines  Neuro: CN 2-12 intact, moves all extremities equally  Psych: Alert & oriented x 3    Imaging with results:  EKG 12 Lead 11/1/2023: NSR HR 85        ECHO:  Interpretation Summary  Ischemic CM. Large LAD MI. Left ventricular function is decreased. The  ejection fraction is 20-30% (severely reduced).  Global right ventricular function is normal.  No significant valvular abnormalities  present.  IVC diameter <2.1 cm collapsing >50% with sniff suggests a normal RA pressure  of 3 mmHg.  No pericardial effusion is present.  No significant changes noted.     Coronary Angiogram 10/26/2023:  Comments/Patient Narrative  74 y/o M with PMH of HTN, HLD, prostate Ca presents with anterolateral STEMI.      Pre Procedure Diagnosis  STEMI        Conclusion       1st Mrg lesion is 20% stenosed.    Prox LAD to Mid LAD lesion is 100% stenosed.    1st Diag-1 lesion is 80% stenosed.    1st Diag-2 lesion is 50% stenosed.    Dist LAD lesion is 100% stenosed.    RPDA lesion is 70% stenosed.    Dist RCA lesion is 40% stenosed.     Anterior STEMI  Two vessel obstructive CAD (100% pLAD occlusion, 70% rPDA stenosis, 80% diagonal 1 stenosis)  PCI of pLAD to mLAD with BRENDA x 1 (Synergy 2.50x 28 mm) post dilated to 2.75 vessel  CVC placement in RIJ  LVEDP of 26 mmHg  Hemostasis of RRA with TR band      Coronary Findings  Diagnostic  Dominance: Right  Left Main   The vessel is large and is angiographically normal.      Left Anterior Descending   The vessel is large.   Prox LAD to Mid LAD lesion is 100% stenosed.   Dist LAD lesion is 100% stenosed.      First Diagonal Branch   1st Diag-1 lesion is 80% stenosed.   1st Diag-2 lesion is 50% stenosed.      Lateral First Diagonal Branch   The vessel is moderate in size. The vessel exhibits minimal luminal irregularities.      Left Circumflex   The vessel is large.      First Obtuse Marginal Branch   The vessel is moderate in size and is angiographically normal.   1st Mrg lesion is 20% stenosed.      Second Obtuse Marginal Branch   The vessel is moderate in size and is angiographically normal.      Third Obtuse Marginal Branch   The vessel is small and is angiographically normal.      Right Coronary Artery   The vessel is large.   Dist RCA lesion is 40% stenosed.      Right Posterior Descending Artery   The vessel is large. The vessel exhibits minimal luminal irregularities.   RPDA  lesion is 70% stenosed.      First Right Posterolateral Branch   The vessel is large. The vessel exhibits minimal luminal irregularities.      Second Right Posterolateral Branch   The vessel is large. The vessel exhibits minimal luminal irregularities.         Intervention   Prox LAD to Mid LAD lesion   Stent   Lesion length: 20 mm. There is no pre-interventional antegrade distal flow (RAIZA 0). A stent was successfully placed. The post-interventional distal flow is normal (RAIZA 3). Left main was engaged with a 6 Fr XB 3.0 GC. Heparin was given to maintain ACT > 250 s throughout the procedure. A 0.014''  50 wire was advanced across the p LAD occlusion to the diagonal 1. pLAD was predilated with an Emerge 2.50x8mm and an Emerge 2.50x20 mm balloon. A 0.014'' Runthrough wire was advanced to the distal LAD. Ostium of diagonal 1 was predilated with an Emerge 2.50x8 mm balloon.  50 wire was removed and pLAD to mLAD was stented with a Synergy [id number] BRENDA. BRENDA was post dilated with an NC Emerge 2.75x12 mm balloon. Post intervention angiogram showed no dissection or other complication.   There is a 0% residual stenosis post intervention.            Hemodynamics  LVEDP of 26 mmHg Left ventricular filling pressures are moderately elevated. Hemodynamic data has been modified in [facility] per physician review.      Coronary Angiogram 10/27/2023:  Coronary Findings  Diagnostic  Dominance: Right  Left Main   The vessel was visualized by selective angiography and is moderate in size. There was 0% vessel disease. Pressure wire/FFR was not performed on the vessel. IVUS was not performed on the vessel.      Left Anterior Descending   The vessel was visualized by selective angiography. There was 20% calcified vessel disease.      Ramus Intermedius   The vessel was visualized by selective angiography and is moderate in size. There was 0% vessel disease.      Left Circumflex   The vessel was visualized by selective angiography and  is moderate in size. There was 15% vessel disease.      Right Coronary Artery   The vessel was visualized by selective angiography. There was 20% vessel disease.      Intervention      No interventions have been documented.       Patient Care Team:  Jose Coles MD as PCP - General (Family Practice)  Davey Randolph MD as Assigned PCP  Aranza Jiménez PA-C as Physician Assistant (Urology)  Aranza Jiménez PA-C as Assigned Surgical Provider  Blas Causey MD as Assigned Cancer Care Provider  Davey Randolph MD as Assigned Pain Medication Provider    NADEGE Munoz CNP  [facility] Card[facility]  Patient discussed with staff cardiologist, Dr. Graham, who agrees with the above documentation and plan. Documentation represents joint decision making.     Time Spent on this Encounter   I, Nayely Wren CNP, personally saw the patient today and spent greater than 30 minutes discharging this patient.   Physician Attestation   I have reviewed and discussed with the advanced practice provider their history, physical and plan for Duane C Johnson. I did not participate in a shared visit by interviewing or examining the patient and this should be billed as an advanced practice provider only visit.    Grant Graham MD  Interventional Cardiology

## 2023-11-01 NOTE — PROGRESS NOTES
11/01/23 0840   Appointment Info   Signing Clinician's Name / Credentials (PT) Chelsey Esparza, PT, DPT   Living Environment   People in Home spouse   Current Living Arrangements house   Home Accessibility stairs within home;stairs to enter home   Number of Stairs, Main Entrance 5   Stair Railings, Main Entrance railings safe and in good condition   Number of Stairs, Within Home, Primary greater than 10 stairs   Stair Railings, Within Home, Primary railings safe and in good condition   Transportation Anticipated family or friend will provide   Living Environment Comments Pt lives in multi-level home with stairs and railings. Has a walk-in shower, needs met on multiple floors.   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 Pt likes cars, electronics, welding etc. Notes he can normally go up stairs taking 2-3 at a time.   General Information   Onset of Illness/Injury or Date of Surgery 10/26/23   Referring Physician Maye Valentine, APRN CNP   Patient/Family Therapy Goals Statement (PT) Return home.   Pertinent History of Current Problem (include personal factors and/or comorbidities that impact the POC) Mr Duane C Johnson is a 73 year old gentleman with a history of HTN, HLD, and tobacco use presented to ED 10/26 with acute chest pain and EKG demonstrating anterior STEMI for which he underwent coronary angigoram and [facility]. His course is complicated by subsequent VF/VT cardiac arrest x2 on 10/27/23 for which repeat angio performed revealing patent stents He is now transferred to the CICU.   Existing Precautions/Restrictions fall;cardiac   Weight-Bearing Status - RUE    (per provider no restriction for R UE WB presently given several days after PCI)   General Observations activity: ambulate as tolerated, per nursing discretion   Cognition   Cognitive Status Comments cooperative   Pain Assessment   Patient Currently in Pain Yes, see Vital Sign flowsheet  (sternal, from chest compressions)   Posture    Posture Comments slightly reduced upright posture   Range of Motion (ROM)   ROM Comment no concerns noted   Bed Mobility   Comment, (Bed Mobility) SBA-IND supine<>sit   Transfers   Comment, (Transfers) SBA sit<>stand, upon sitting down from standing 1x, pt reports feeling quite SOB   Gait/Stairs (Locomotion)   Comment, (Gait/Stairs) CGA to brief GORAN for gait no AD, pt with slowed pace, SOB and fatigue x 20 ft; occasionally reaching for wall railing to steady self. SBA with FWW, but reported dislike; step-to gait on stairs   Balance   Balance Comments below baseline standing static and dynamic balance, benefits from CGA for standing dynamic balance   Clinical Impression   Criteria for Skilled Therapeutic Intervention Yes, treatment indicated   PT Diagnosis (PT) impaired mobility   Influenced by the following impairments fatigue, pain, reduced activity tolerance, balance, strength, ROM   Functional limitations due to impairments below baseline bed mobility, transfers, gait, stairs   Clinical Presentation (PT Evaluation Complexity) evolving   Clinical Presentation Rationale clinical judgement, [facility]   Clinical Decision Making (Complexity) moderate complexity   Planned Therapy Interventions (PT) balance training;bed mobility training;gait training;home exercise program;neuromuscular re-education;patient/family education;postural re-education;ROM (range of motion);stair training;strengthening;stretching;transfer training;risk factor education;home program guidelines;progressive activity/exercise   Risk & Benefits of therapy have been explained evaluation/treatment results reviewed;care  "plan/treatment goals reviewed;risks/benefits reviewed;current/potential barriers reviewed;participants voiced agreement with care plan;participants included;patient   PT Total Evaluation Time   PT Marbin, Moderate Complexity Minutes (67880) 7   Physical Therapy Goals   PT Frequency Daily   PT Predicted Duration/Target Date for Goal Attainment 11/08/23   PT Goals Bed Mobility;Gait;Transfers;Stairs   PT: Bed Mobility Independent;Supine to/from sit;Rolling;Bridging;Within precautions   PT: Transfers Independent;Modified independent;Bed to/from chair;Sit to/from stand;Assistive device;Within precautions   PT: Gait Modified independent;Independent;Within precautions;150 feet   PT Discharge Planning   PT Plan progress mobility, focus on energy conservation, re-assess gait, if needed trial 4WW; revisit stairs   PT Discharge Recommendation (DC Rec) Transitional Care Facility   PT Rationale for DC Rec At this time, advise TCU stay (likely short term) to progress pt's activity tolerance, improve gait stability and IND and reduce his fall risk. Pending progress and LOS, may progress to home discharge w/OP CR. Anticipate pt would tend to overdo initial activities at home so would need to be pretty IND to be discharging home safely to reduce fall risk and allow him to perform daily activities. Would also likely benefit from a walker (pt likely to prefer 4WW with brakes). Pt unsure how much spouse would be able to \"keep up\" with him and assist at home per her status. Pt however may refuse TCU based on discussion.   PT Brief overview of current status Ax1 to mobilize, bed/chair alarms to encourage staff assistance due to increased fall risk   PT Equipment Needed at Discharge walker, rolling  (4WW)   Total Session Time   Total Session Time (sum of timed and untimed services) 7       "

## 2023-11-02 ENCOUNTER — APPOINTMENT (OUTPATIENT)
Dept: PHYSICAL THERAPY | Facility: CLINIC | Age: 73
DRG: 321 | End: 2023-11-02
Payer: MEDICARE

## 2023-11-02 ENCOUNTER — APPOINTMENT (OUTPATIENT)
Dept: OCCUPATIONAL THERAPY | Facility: CLINIC | Age: 73
DRG: 321 | End: 2023-11-02
Payer: MEDICARE

## 2023-11-02 LAB
ANION GAP SERPL CALCULATED.3IONS-SCNC: 11 MMOL/L (ref 7–15)
ANION GAP SERPL CALCULATED.3IONS-SCNC: 9 MMOL/L (ref 7–15)
ATRIAL RATE - MUSE: 85 BPM
BUN SERPL-MCNC: 35.5 MG/DL (ref 8–23)
BUN SERPL-MCNC: 39.4 MG/DL (ref 8–23)
CALCIUM SERPL-MCNC: 8.5 MG/DL (ref 8.8–10.2)
CALCIUM SERPL-MCNC: 8.5 MG/DL (ref 8.8–10.2)
CHLORIDE SERPL-SCNC: 101 MMOL/L (ref 98–107)
CHLORIDE SERPL-SCNC: 104 MMOL/L (ref 98–107)
CREAT SERPL-MCNC: 0.96 MG/DL (ref 0.67–1.17)
CREAT SERPL-MCNC: 0.98 MG/DL (ref 0.67–1.17)
DEPRECATED HCO3 PLAS-SCNC: 22 MMOL/L (ref 22–29)
DEPRECATED HCO3 PLAS-SCNC: 23 MMOL/L (ref 22–29)
DIASTOLIC BLOOD PRESSURE - MUSE: NORMAL MMHG
EGFRCR SERPLBLD CKD-EPI 2021: 81 ML/MIN/1.73M2
EGFRCR SERPLBLD CKD-EPI 2021: 83 ML/MIN/1.73M2
ERYTHROCYTE [DISTWIDTH] IN BLOOD BY AUTOMATED COUNT: 13.2 % (ref 10–15)
GLUCOSE SERPL-MCNC: 106 MG/DL (ref 70–99)
GLUCOSE SERPL-MCNC: 94 MG/DL (ref 70–99)
HCT VFR BLD AUTO: 30.2 % (ref 40–53)
HGB BLD-MCNC: 10.2 G/DL (ref 13.3–17.7)
INTERPRETATION ECG - MUSE: NORMAL
MAGNESIUM SERPL-MCNC: 2.1 MG/DL (ref 1.7–2.3)
MCH RBC QN AUTO: 32.7 PG (ref 26.5–33)
MCHC RBC AUTO-ENTMCNC: 33.8 G/DL (ref 31.5–36.5)
MCV RBC AUTO: 97 FL (ref 78–100)
P AXIS - MUSE: 44 DEGREES
PHOSPHATE SERPL-MCNC: 2.9 MG/DL (ref 2.5–4.5)
PLATELET # BLD AUTO: 264 10E3/UL (ref 150–450)
POTASSIUM SERPL-SCNC: 3.8 MMOL/L (ref 3.4–5.3)
POTASSIUM SERPL-SCNC: 4 MMOL/L (ref 3.4–5.3)
PR INTERVAL - MUSE: 180 MS
QRS DURATION - MUSE: 88 MS
QT - MUSE: 348 MS
QTC - MUSE: 414 MS
R AXIS - MUSE: -19 DEGREES
RBC # BLD AUTO: 3.12 10E6/UL (ref 4.4–5.9)
SODIUM SERPL-SCNC: 133 MMOL/L (ref 135–145)
SODIUM SERPL-SCNC: 137 MMOL/L (ref 135–145)
SYSTOLIC BLOOD PRESSURE - MUSE: NORMAL MMHG
T AXIS - MUSE: 27 DEGREES
VENTRICULAR RATE- MUSE: 85 BPM
WBC # BLD AUTO: 8.3 10E3/UL (ref 4–11)

## 2023-11-02 PROCEDURE — 36415 COLL VENOUS BLD VENIPUNCTURE: CPT

## 2023-11-02 PROCEDURE — 97129 THER IVNTJ 1ST 15 MIN: CPT | Mod: GO

## 2023-11-02 PROCEDURE — 250N000013 HC RX MED GY IP 250 OP 250 PS 637: Performed by: NURSE PRACTITIONER

## 2023-11-02 PROCEDURE — 93005 ELECTROCARDIOGRAM TRACING: CPT

## 2023-11-02 PROCEDURE — 99233 SBSQ HOSP IP/OBS HIGH 50: CPT

## 2023-11-02 PROCEDURE — 250N000011 HC RX IP 250 OP 636: Mod: JZ | Performed by: NURSE PRACTITIONER

## 2023-11-02 PROCEDURE — 97116 GAIT TRAINING THERAPY: CPT | Mod: GP

## 2023-11-02 PROCEDURE — 97130 THER IVNTJ EA ADDL 15 MIN: CPT | Mod: GO

## 2023-11-02 PROCEDURE — 82310 ASSAY OF CALCIUM: CPT | Performed by: NURSE PRACTITIONER

## 2023-11-02 PROCEDURE — 97530 THERAPEUTIC ACTIVITIES: CPT | Mod: GP

## 2023-11-02 PROCEDURE — 85027 COMPLETE CBC AUTOMATED: CPT | Performed by: NURSE PRACTITIONER

## 2023-11-02 PROCEDURE — 250N000013 HC RX MED GY IP 250 OP 250 PS 637: Performed by: INTERNAL MEDICINE

## 2023-11-02 PROCEDURE — 80048 BASIC METABOLIC PNL TOTAL CA: CPT

## 2023-11-02 PROCEDURE — 36415 COLL VENOUS BLD VENIPUNCTURE: CPT | Performed by: NURSE PRACTITIONER

## 2023-11-02 PROCEDURE — 84100 ASSAY OF PHOSPHORUS: CPT

## 2023-11-02 PROCEDURE — 93010 ELECTROCARDIOGRAM REPORT: CPT | Performed by: INTERNAL MEDICINE

## 2023-11-02 PROCEDURE — 250N000013 HC RX MED GY IP 250 OP 250 PS 637: Performed by: STUDENT IN AN ORGANIZED HEALTH CARE EDUCATION/TRAINING PROGRAM

## 2023-11-02 PROCEDURE — 83735 ASSAY OF MAGNESIUM: CPT

## 2023-11-02 PROCEDURE — 120N000003 HC R&B IMCU UMMC

## 2023-11-02 RX ORDER — POTASSIUM CHLORIDE 750 MG/1
20 TABLET, EXTENDED RELEASE ORAL ONCE
Status: COMPLETED | OUTPATIENT
Start: 2023-11-02 | End: 2023-11-02

## 2023-11-02 RX ADMIN — CARVEDILOL 3.12 MG: 3.12 TABLET, FILM COATED ORAL at 18:35

## 2023-11-02 RX ADMIN — CARVEDILOL 3.12 MG: 3.12 TABLET, FILM COATED ORAL at 08:57

## 2023-11-02 RX ADMIN — TICAGRELOR 90 MG: 90 TABLET ORAL at 08:59

## 2023-11-02 RX ADMIN — QUETIAPINE FUMARATE 25 MG: 25 TABLET ORAL at 22:23

## 2023-11-02 RX ADMIN — AMIODARONE HYDROCHLORIDE 400 MG: 200 TABLET ORAL at 08:58

## 2023-11-02 RX ADMIN — TICAGRELOR 90 MG: 90 TABLET ORAL at 19:50

## 2023-11-02 RX ADMIN — CEFTRIAXONE SODIUM 1 G: 1 INJECTION, POWDER, FOR SOLUTION INTRAMUSCULAR; INTRAVENOUS at 09:06

## 2023-11-02 RX ADMIN — APIXABAN 5 MG: 5 TABLET, FILM COATED ORAL at 08:57

## 2023-11-02 RX ADMIN — ATORVASTATIN CALCIUM 80 MG: 80 TABLET, FILM COATED ORAL at 08:59

## 2023-11-02 RX ADMIN — Medication 10 MG: at 19:50

## 2023-11-02 RX ADMIN — APIXABAN 5 MG: 5 TABLET, FILM COATED ORAL at 19:50

## 2023-11-02 RX ADMIN — POTASSIUM CHLORIDE 20 MEQ: 750 TABLET, EXTENDED RELEASE ORAL at 03:14

## 2023-11-02 RX ADMIN — AMIODARONE HYDROCHLORIDE 400 MG: 200 TABLET ORAL at 19:50

## 2023-11-02 RX ADMIN — LIDOCAINE 1 PATCH: 4 PATCH TOPICAL at 08:59

## 2023-11-02 ASSESSMENT — ACTIVITIES OF DAILY LIVING (ADL)
ADLS_ACUITY_SCORE: 31
ADLS_ACUITY_SCORE: 30
ADLS_ACUITY_SCORE: 31
ADLS_ACUITY_SCORE: 30
ADLS_ACUITY_SCORE: 31

## 2023-11-02 NOTE — PLAN OF CARE
Pt is a 73 year old male with a history of HTN, HLD, and tobacco use presented to ED 10/26 with acute chest pain and EKG demonstrating anterior STEMI. Hospitalization complicated by VT/Vfib arrest.     Activity: Standby assist  Pain: Lidocaine patch on check  Neuro: wnl  Cardiac: sinus rhythm   Respiratory: wnl  GI/: needs encouragement with PO intake  Diet: Cardiac  Lines: PIV on the R    Continue to assess. Changes and concerns will be reported to provider.         Plan of Care Reviewed With: patient    Overall Patient Progress: improvingOverall Patient Progress: improving

## 2023-11-02 NOTE — PROGRESS NOTES
Interventional Cardiology Progress Note      Assessment & Plan:  Duane C Johnson is a 73 year old male with a history of HTN, HLD, and tobacco use presented to ED 10/26 with acute chest pain and EKG demonstrating anterior STEMI for which he underwent coronary angigoram and had PCI with BRENDA to pLAD. His course is complicated by subsequent VF/VT cardiac arrest x2 on 10/27/23 for which repeat angio performed revealing patent stents. Patient has now been stable on PO Amio.      Today's Changes:  - Stop Klonopin due to increased confusion  - PT to see for additional discharge recs     # Anterior STEMI s/p PCI LAD 10/26  # VT/VF arrest on 10/27  # Coronary Artery Disease  # Acute Systolic Heart Failure (EF 20-30%) ischemic cardiomyopathy  # Sternal wall pain 2/2 CPR  # HTN  # HLD  Patient admitted 10/26 with chest pain that radiated to left arm, worse with movement, so came into ED, initial EKG with anterior STEMI. Cath lab activated. Patient s/p PCI pLAD.  LVEDP at that time 26. Post procedure pt briefly on pressors for HOTN, was weaned off with use of Midodrine (now also off). Transferred out of ICU 10/27. Pt also having frequent runs of NSVT, 10/27 evening, went back into sustained VT and cardiac arrest x 2. Patient started on Lidocaine and amiodarone gtt. Lido off 10/28, amio transitioned to PO. Cors repeated, patent stents, no additional intervention needed. Transferred to floor 10/31.  - Most recent Echo 10/30: EF 20-30%, large LAD territory akinesis, normal IVC  - DAPT: Eliquis (given anterior wall akinesis) x 3 months, Brilinta 90 mg BID x 1 month then change to Plavix for 1 year (cost)  - Get cardiac MRI in 1 month, at that time, pending EF and thrombus, could consider stopping Eliquis and replacing with 81 mg ASA daily  - BB: Coreg 3.125 mg BID  - ACEi/ARB/ARNI: not started due to HOTN; consider starting as OP  - AA: not started due to HOTN; Consider starting as OP  - SGLT2i: cost prohibitive   - Statin:  "Lipitor 80 mg daily  - Volume status: euvolemic, no diuretic needed at this time  - Rhythm control: Amiodarone 400 mg BID through 11/7 (for 10g load), then reduce to 200 mg daily  - SCD prophy: no ICD needed at this time given VT occurred within 48 hours of PCI, if EF remains less than 35% on GDMT in 3 months, consider referral for ICD  - Lidocaine patches available for sternal wall pain  - Cardiac Rehab following, pt seems more open to TCU or home services if recommended  - Daily BMP, Mg, replace lytes per protocol     # Aspiration Pneumonia   # Leukocytosis, resolved  # Acute respiratory failure with hypoxia, improving  # Probable TABATHA  WBC peak 14.4. CXR with increased R lung opacity, pt with productive cough. Started on IV Rocephin for probable aspiration pneumonia. Patient afebrile.  - Wean O2 as able  - IV Rocephin started 10/30, continue through 11/3 for pna treatment, can switch to PO Augmentin at time of discharge  - WBC 8.3 (11.5)  - Increased O2 needs at night, likely TABATHA, sleep study referral at time of discharge     # Panic Attacks 2/2 cardiac arrest  # Possible OCD  Psych consulted 10/29, 10/30 per patient request for increased anxiety after cardiac arrest.   - Per psych recs, Klonopin 0.5 mg given x1 yesterday with increased confusion/delirium  - Discontinue Klonopin     Prophylaxis:  DVT: PO Eliquis  Diet: Cardiac  Activity: up as tolerated  Code Status: Full  Disposition: possible discharge home tomorrow vs TCU pending CR recs     Patient discussed w/ Dr. Braden.    Nanette Aleman MS, PA-C  [facility] Structural/Interventional Cardiology   Pager [phone]    Interval History:  9-10 beat run of NSVT overnight, ECG without changes and patient asymptomatic. Patient reports feeling well and \"more feisty\" today. He is eager to be discharged, but appears more open to TCU if recommended by PT/OT.     Most recent vital signs:  BP 93/66 (BP Location: Left arm)   Pulse 73   Temp 98.7  F (37.1  C) (Oral)   " "Resp 18   Ht 1.676 m (5' 5.98\")   Wt 66.5 kg (146 lb 11.2 oz)   SpO2 93%   BMI 23.69 kg/m    Temp:  [97.5  F (36.4  C)-98.7  F (37.1  C)] 98.7  F (37.1  C)  Pulse:  [72-86] 73  Resp:  [16-31] 18  BP: (90-99)/(58-72) 93/66  SpO2:  [93 %-99 %] 93 %  Wt Readings from Last 2 Encounters:   11/02/23 66.5 kg (146 lb 11.2 oz)   10/09/23 67.1 kg (148 lb)       Intake/Output Summary (Last 24 hours) at 11/2/2023 0958  Last data filed at 11/1/2023 2231  Gross per 24 hour   Intake 1340 ml   Output 280 ml   Net 1060 ml       Physical exam:  General: In bed, in NAD; easily distracted  HEENT: EOMI, PERRLA, no scleral icterus or injection  CARDIAC: RRR, no m/r/g appreciated. Peripheral pulses 2+  RESP: CTAB, no wheezes, rhonchi or crackles appreciated.  GI: NABS, NT/ND, no guarding or rebound  EXTREMITIES: NO LE edema, pulses 2+.   SKIN: No acute lesions appreciated      Labs (Past three days):  CBC  Recent Labs   Lab 11/02/23  0653 11/01/23  0356 10/31/23  0431 10/30/23  0407   WBC 8.3 11.5* 10.7 14.0*   RBC 3.12* 3.35* 3.48* 3.46*   HGB 10.2* 10.9* 11.4* 11.3*   HCT 30.2* 32.1* 33.4* 33.2*   MCV 97 96 96 96   MCH 32.7 32.5 32.8 32.7   MCHC 33.8 34.0 34.1 34.0   RDW 13.2 13.2 13.1 13.2    289 270 263     BMP  Recent Labs   Lab 11/02/23  0653 11/02/23  0039 11/01/23  1402 11/01/23  0356 10/31/23  1632 10/31/23  0431 10/30/23  1550 10/30/23  0407 10/29/23  1341 10/29/23  0348    133*  --  138 139 141   < > 139   < > 139   POTASSIUM 4.0 3.8  --  3.7 3.9 3.9   < > 3.8   < > 3.9   CHLORIDE 104 101  --  105 102 105   < > 104   < > 104   CO2 22 23  --  23 23 25   < > 24   < > 21*   ANIONGAP 11 9  --  10 14 11   < > 11   < > 14   GLC 94 106* 121* 105* 81 92   < > 112*   < > 115*   BUN 35.5* 39.4*  --  34.2* 31.3* 31.3*   < > 34.7*   < > 31.4*   CR 0.96 0.98  --  0.88 0.96 1.05   < > 1.08   < > 1.05   GFRESTIMATED 83 81  --  >90 83 75   < > 72   < > 75   PRANAV 8.5* 8.5*  --  8.4* 8.7* 8.6*   < > 8.6*   < > 8.7*   MAG  --  2.1 "  --  2.0  --  2.0  --  1.9  --  2.1   PHOS  --  2.9  --  2.6  --   --   --  1.7*  --  4.5    < > = values in this interval not displayed.     Troponins:   Lab Results   Component Value Date    CTROPT 6,006 () 10/28/2023    CTROPT 7,354 () 10/27/2023    CTROPT 8,255 () 10/27/2023    CTROPT 7,637 () 10/27/2023    CTROPT 8,624 () 10/27/2023        INR  Recent Labs   Lab 10/27/23  1943   INR 1.23*     Liver panel  Recent Labs   Lab 11/01/23  0356 10/31/23  1632 10/31/23  0431 10/30/23  1550   PROTTOTAL 6.0* 6.6 6.3* 6.9   ALBUMIN 2.8* 3.2* 3.1* 3.4*   BILITOTAL 0.7 1.1 0.8 0.8   ALKPHOS 72 73 71 78   AST 72* 64* 67* 81*   ALT 44 42 41 45       Imaging/procedure results:       ECHO:  Interpretation Summary  Ischemic CM. Large LAD MI. Left ventricular function is decreased. The  ejection fraction is 20-30% (severely reduced).  Global right ventricular function is normal.  No significant valvular abnormalities present.  IVC diameter <2.1 cm collapsing >50% with sniff suggests a normal RA pressure  of 3 mmHg.  No pericardial effusion is present.  No significant changes noted.     Coronary Angiogram 10/26/2023:  Comments/Patient Narrative  74 y/o M with PMH of HTN, HLD, prostate Ca presents with anterolateral STEMI.      Pre Procedure Diagnosis  STEMI        Conclusion       1st Mrg lesion is 20% stenosed.    Prox LAD to Mid LAD lesion is 100% stenosed.    1st Diag-1 lesion is 80% stenosed.    1st Diag-2 lesion is 50% stenosed.    Dist LAD lesion is 100% stenosed.    RPDA lesion is 70% stenosed.    Dist RCA lesion is 40% stenosed.     Anterior STEMI  Two vessel obstructive CAD (100% pLAD occlusion, 70% rPDA stenosis, 80% diagonal 1 stenosis)  PCI of pLAD to mLAD with BRENDA x 1 (Synergy 2.50x 28 mm) post dilated to 2.75 vessel  [facility] placement in [facility]  LVEDP of 26 mmHg  Hemostasis of RRA with TR band      Coronary Findings  Diagnostic  Dominance: Right  Left Main   The vessel is large and is angiographically normal.       Left Anterior Descending   The vessel is large.   Prox LAD to Mid LAD lesion is 100% stenosed.   Dist LAD lesion is 100% stenosed.      First Diagonal Branch   1st Diag-1 lesion is 80% stenosed.   1st Diag-2 lesion is 50% stenosed.      Lateral First Diagonal Branch   The vessel is moderate in size. The vessel exhibits minimal luminal irregularities.      Left Circumflex   The vessel is large.      First Obtuse Marginal Branch   The vessel is moderate in size and is angiographically normal.   1st Mrg lesion is 20% stenosed.      Second Obtuse Marginal Branch   The vessel is moderate in size and is angiographically normal.      Third Obtuse Marginal Branch   The vessel is small and is angiographically normal.      Right Coronary Artery   The vessel is large.   Dist RCA lesion is 40% stenosed.      Right Posterior Descending Artery   The vessel is large. The vessel exhibits minimal luminal irregularities.   RPDA lesion is 70% stenosed.      First Right Posterolateral Branch   The vessel is large. The vessel exhibits minimal luminal irregularities.      Second Right Posterolateral Branch   The vessel is large. The vessel exhibits minimal luminal irregularities.         Intervention   Prox LAD to Mid LAD lesion   Stent   Lesion length: 20 mm. There is no pre-interventional antegrade distal flow (RAIZA 0). A stent was successfully placed. The post-interventional distal flow is normal (RAIZA 3). Left main was engaged with a 6 Fr XB 3.0 GC. Heparin was given to maintain ACT > 250 s throughout the procedure. A 0.014''  50 wire was advanced across the p LAD occlusion to the diagonal 1. pLAD was predilated with an Emerge 2.50x8mm and an Emerge 2.50x20 mm balloon. A 0.014'' Runthrough wire was advanced to the distal LAD. Ostium of diagonal 1 was predilated with an Emerge 2.50x8 mm balloon.  50 wire was removed and pLAD to mLAD was stented with a Synergy 2.84a40ev BRENDA. BRENDA was post dilated with an NC Emerge 2.75x12 mm  balloon. Post intervention angiogram showed no dissection or other complication.   There is a 0% residual stenosis post intervention.            Hemodynamics  LVEDP of 26 mmHg Left ventricular filling pressures are moderately elevated. Hemodynamic data has been modified in [facility] per physician review.      Coronary Angiogram 10/27/2023:  Coronary Findings  Diagnostic  Dominance: Right  Left Main   The vessel was visualized by selective angiography and is moderate in size. There was 0% vessel disease. Pressure wire/FFR was not performed on the vessel. IVUS was not performed on the vessel.      Left Anterior Descending   The vessel was visualized by selective angiography. There was 20% calcified vessel disease.      Ramus Intermedius   The vessel was visualized by selective angiography and is moderate in size. There was 0% vessel disease.      Left Circumflex   The vessel was visualized by selective angiography and is moderate in size. There was 15% vessel disease.      Right Coronary Artery   The vessel was visualized by selective angiography. There was 20% vessel disease          Medical Decision Making       60 MINUTES SPENT BY ME on the date of service doing chart review, history, exam, documentation & further activities per the note.

## 2023-11-02 NOTE — PROGRESS NOTES
"Hours of Care:  1900 - 0700    Events: One incidence of significant confusion/agitation. Pt requested to be walked around unit to \"prove to him that this was a legitimate facility\" and that he was not being \"held against his will for organ harvesting.\" After walk, pt calmed down and even apologized to writer for the behavior.     Neuro: A/O x 4. Pt had incident of significant confusion, agitation, and mistrustful behavior. Calmed pt with walk around unit and listening to concerns.   Respiratory: 1-2 L NC overnight. Lung sounds clear. DIOP.   Cardiac: VSS. One 10 beat run of VT/Afib overnight. Pt asymptomatic, EKG unchanged, Mg and Phos within range, K replaced.      GI: Last BM 11/1. No report of nausea or vomiting.  : total UOP: 280, bladder scan q4h if not voiding  Skin: See PCS for assessment and treatment of wounds and surgical incisions.  LDA: R PIV   Antibiotics: q24h ceftriaxone   Electrolytes: RN managed Mg, K.   Pain: Denies   Diet: Low saturated fat, Na <2400 mg     P: Continue to follow POC and report changes to [facility].         "

## 2023-11-02 NOTE — PROGRESS NOTES
Care Management Follow Up    Length of Stay (days): 7    Expected Discharge Date: 11/4/23     Concerns to be Addressed: Discharge planning  Patient plan of care discussed at interdisciplinary rounds: Yes    Anticipated Discharge Disposition: TCU     Anticipated Discharge Services: Therapies  Anticipated Discharge DME: TBD    Patient/family educated on Medicare website which has current facility and service quality ratings: Yes  Education Provided on the Discharge Plan: Yes  Patient/Family in Agreement with the Plan:  Yes    Referrals Placed by CM/SW:    [facility]  [address] [place]  [address]  P: [phone]  P: [phone]  F: [phone]     [facility]  [address] [place]  [address]  P: [phone]  P: [phone] - Admissions  F: [phone]     [facility]  [address] [place]  [address]  P: [phone]  F: [phone]     Private pay costs discussed: Not applicable    Additional Information:  SW met with Pt to provide TCU list from Medicare.gov. Pt is open to TCU and Pt and family identified agreement with the above three TCU's. SW sent initial referrals. SW will continue to follow for safe discharge planning and placement.     ________________    MONTRELL Jacinto, LICSW  [address]  [facility]  Phone: [phone]  Pager: [phone]  Fax: [phone]

## 2023-11-03 ENCOUNTER — APPOINTMENT (OUTPATIENT)
Dept: OCCUPATIONAL THERAPY | Facility: CLINIC | Age: 73
DRG: 321 | End: 2023-11-03
Payer: MEDICARE

## 2023-11-03 VITALS
RESPIRATION RATE: 19 BRPM | OXYGEN SATURATION: 99 % | HEIGHT: 66 IN | DIASTOLIC BLOOD PRESSURE: 66 MMHG | HEART RATE: 66 BPM | TEMPERATURE: 99.2 F | SYSTOLIC BLOOD PRESSURE: 99 MMHG | BODY MASS INDEX: 23.63 KG/M2 | WEIGHT: 147.05 LBS

## 2023-11-03 LAB
ANION GAP SERPL CALCULATED.3IONS-SCNC: 9 MMOL/L (ref 7–15)
BUN SERPL-MCNC: 26.8 MG/DL (ref 8–23)
CALCIUM SERPL-MCNC: 8.5 MG/DL (ref 8.8–10.2)
CHLORIDE SERPL-SCNC: 104 MMOL/L (ref 98–107)
CREAT SERPL-MCNC: 1 MG/DL (ref 0.67–1.17)
DEPRECATED HCO3 PLAS-SCNC: 23 MMOL/L (ref 22–29)
EGFRCR SERPLBLD CKD-EPI 2021: 79 ML/MIN/1.73M2
ERYTHROCYTE [DISTWIDTH] IN BLOOD BY AUTOMATED COUNT: 13.2 % (ref 10–15)
GLUCOSE SERPL-MCNC: 95 MG/DL (ref 70–99)
HCT VFR BLD AUTO: 28.8 % (ref 40–53)
HGB BLD-MCNC: 9.5 G/DL (ref 13.3–17.7)
MAGNESIUM SERPL-MCNC: 2.2 MG/DL (ref 1.7–2.3)
MCH RBC QN AUTO: 32 PG (ref 26.5–33)
MCHC RBC AUTO-ENTMCNC: 33 G/DL (ref 31.5–36.5)
MCV RBC AUTO: 97 FL (ref 78–100)
PLATELET # BLD AUTO: 300 10E3/UL (ref 150–450)
POTASSIUM SERPL-SCNC: 4 MMOL/L (ref 3.4–5.3)
RBC # BLD AUTO: 2.97 10E6/UL (ref 4.4–5.9)
SODIUM SERPL-SCNC: 136 MMOL/L (ref 135–145)
WBC # BLD AUTO: 9.9 10E3/UL (ref 4–11)

## 2023-11-03 PROCEDURE — 80048 BASIC METABOLIC PNL TOTAL CA: CPT | Performed by: NURSE PRACTITIONER

## 2023-11-03 PROCEDURE — 250N000013 HC RX MED GY IP 250 OP 250 PS 637: Performed by: STUDENT IN AN ORGANIZED HEALTH CARE EDUCATION/TRAINING PROGRAM

## 2023-11-03 PROCEDURE — 36415 COLL VENOUS BLD VENIPUNCTURE: CPT | Performed by: NURSE PRACTITIONER

## 2023-11-03 PROCEDURE — 85027 COMPLETE CBC AUTOMATED: CPT | Performed by: NURSE PRACTITIONER

## 2023-11-03 PROCEDURE — 83735 ASSAY OF MAGNESIUM: CPT | Performed by: INTERNAL MEDICINE

## 2023-11-03 PROCEDURE — 999N000248 HC STATISTIC IV INSERT WITH US BY RN

## 2023-11-03 PROCEDURE — 250N000011 HC RX IP 250 OP 636: Mod: JZ | Performed by: NURSE PRACTITIONER

## 2023-11-03 PROCEDURE — 97535 SELF CARE MNGMENT TRAINING: CPT | Mod: GO

## 2023-11-03 PROCEDURE — 250N000013 HC RX MED GY IP 250 OP 250 PS 637: Performed by: NURSE PRACTITIONER

## 2023-11-03 PROCEDURE — 99239 HOSP IP/OBS DSCHRG MGMT >30: CPT | Performed by: NURSE PRACTITIONER

## 2023-11-03 RX ORDER — ATORVASTATIN CALCIUM 80 MG/1
80 TABLET, FILM COATED ORAL DAILY
Qty: 90 TABLET | Refills: 3 | DISCHARGE
Start: 2023-11-04 | End: 2023-12-08

## 2023-11-03 RX ORDER — AMIODARONE HYDROCHLORIDE 400 MG/1
400 TABLET ORAL 2 TIMES DAILY
Status: ON HOLD | DISCHARGE
Start: 2023-11-03 | End: 2023-11-12

## 2023-11-03 RX ORDER — METOPROLOL SUCCINATE 25 MG/1
25 TABLET, EXTENDED RELEASE ORAL DAILY
Status: ON HOLD | DISCHARGE
Start: 2023-11-04 | End: 2023-11-12

## 2023-11-03 RX ORDER — PHENOL 1.4 %
10 AEROSOL, SPRAY (ML) MUCOUS MEMBRANE AT BEDTIME
DISCHARGE
Start: 2023-11-03 | End: 2023-12-25

## 2023-11-03 RX ORDER — METOPROLOL SUCCINATE 25 MG/1
25 TABLET, EXTENDED RELEASE ORAL DAILY
Status: DISCONTINUED | OUTPATIENT
Start: 2023-11-03 | End: 2023-11-03 | Stop reason: HOSPADM

## 2023-11-03 RX ORDER — AMIODARONE HYDROCHLORIDE 200 MG/1
200 TABLET ORAL DAILY
DISCHARGE
Start: 2023-11-08 | End: 2023-11-12

## 2023-11-03 RX ORDER — LOSARTAN POTASSIUM 25 MG/1
12.5 TABLET ORAL EVERY EVENING
Status: ON HOLD | DISCHARGE
Start: 2023-11-03 | End: 2023-11-12

## 2023-11-03 RX ADMIN — CEFTRIAXONE SODIUM 1 G: 1 INJECTION, POWDER, FOR SOLUTION INTRAMUSCULAR; INTRAVENOUS at 09:39

## 2023-11-03 RX ADMIN — ATORVASTATIN CALCIUM 80 MG: 80 TABLET, FILM COATED ORAL at 07:47

## 2023-11-03 RX ADMIN — LIDOCAINE 1 PATCH: 4 PATCH TOPICAL at 07:48

## 2023-11-03 RX ADMIN — SENNOSIDES AND DOCUSATE SODIUM 2 TABLET: 8.6; 5 TABLET ORAL at 07:47

## 2023-11-03 RX ADMIN — TICAGRELOR 90 MG: 90 TABLET ORAL at 07:47

## 2023-11-03 RX ADMIN — METOPROLOL SUCCINATE 25 MG: 25 TABLET, EXTENDED RELEASE ORAL at 09:48

## 2023-11-03 RX ADMIN — AMIODARONE HYDROCHLORIDE 400 MG: 200 TABLET ORAL at 07:47

## 2023-11-03 RX ADMIN — APIXABAN 5 MG: 5 TABLET, FILM COATED ORAL at 07:46

## 2023-11-03 ASSESSMENT — ACTIVITIES OF DAILY LIVING (ADL)
ADLS_ACUITY_SCORE: 30

## 2023-11-03 NOTE — PROGRESS NOTES
Interventional Cardiology Progress Note    Assessment & Plan:  Duane C Johnson is a 73 year old male with a history of HTN, HLD, and tobacco use presented to ED 10/26 with acute chest pain and EKG demonstrating anterior STEMI for which he underwent coronary angigoram and had PCI with BRENDA to pLAD. His course is complicated by subsequent VF/VT cardiac arrest x2 on 10/27/23 for which repeat angio performed revealing patent stents. Patient has now been stable on PO Amio.      Today's Changes:  - Change Coreg to Toprol  - Start Losartan 12.5 mg every evening (night time dosing due to soft BP)  - ongoing therapies      # Anterior STEMI s/p PCI LAD 10/26  # VT/VF arrest on 10/27  # Coronary Artery Disease  # Acute Systolic Heart Failure (EF 20-30%) ischemic cardiomyopathy  # Sternal wall pain 2/2 CPR  # HTN  # HLD  Patient admitted 10/26 with chest pain that radiated to left arm, worse with movement, so came into ED, initial EKG with anterior STEMI. Cath lab activated. Patient s/p PCI pLAD.  LVEDP at that time 26. Post procedure pt briefly on pressors for HOTN, was weaned off with use of Midodrine (now also off). Transferred out of ICU 10/27. Pt also having frequent runs of NSVT, 10/27 evening, went back into sustained VT and cardiac arrest x 2. Patient started on Lidocaine and amiodarone gtt. Lido off 10/28, amio transitioned to PO. Cors repeated, patent stents, no additional intervention needed. Transferred to floor 10/31.    - Most recent Echo 10/30: EF 20-30%, large LAD territory akinesis, normal IVC  - DAPT: Eliquis (given anterior wall akinesis) x 3 months, Brilinta 90 mg BID x 1 month then change to Plavix for 1 year (cost)  - Get cardiac MRI in 1 month, at that time, pending EF and thrombus, could consider stopping Eliquis and replacing with 81 mg ASA daily  - BB: Switch Coreg to Toprol for more BP room  - ACEi/ARB/ARNI: Start 12.5 mg Losartan at bedtime (given HOTN), holding parameters in place  - AA: not  started due to HOTN; Consider starting as OP  - SGLT2i: cost prohibitive   - Statin: Lipitor 80 mg daily  - Volume status: euvolemic, no diuretic needed at this time  - Rhythm control: Amiodarone 400 mg BID through 11/7 (for 10g load), then reduce to 200 mg daily  - SCD prophy: no ICD needed at this time given VT occurred within 48 hours of PCI, if EF remains less than 35% on GDMT in 3 months, consider referral for ICD  - Lidocaine patches available for sternal wall pain  - Cardiac Rehab following, pt seems more open to TCU or home services if recommended  - Daily BMP, Mg, replace lytes per protocol     # Aspiration Pneumonia   # Leukocytosis, resolved  # Acute respiratory failure with hypoxia, improving  # Probable TABATHA  WBC peak 14.4. CXR with increased R lung opacity, pt with productive cough. Started on IV Rocephin for probable aspiration pneumonia. Patient afebrile.  - IV Rocephin started 10/30, continue through 11/3 for pna treatment  - WBC 9.9 (8.3)  - Increased O2 needs at night, likely TABATHA, sleep study referral at time of discharge     # Delirium, improving  # Panic Attacks 2/2 cardiac arrest  # Possible OCD  Psych consulted 10/29, 10/30 per patient request for increased anxiety after cardiac arrest.   - Per psych recs, Klonopin 0.5 mg BID; however after 1 dose, patient with increased confusion, ?medication related vs hospital [facility]  - Klonopin discontinued 11/2  - delirium prevention protocol in place    Prophylaxis:  DVT: PO Eliquis    Diet: Low Saturated Fat  Activity: Up as tolerated  Code Status: Full  Disposition: Medically ready for discharge to TCU    Patient discussed w/ Dr. Graham.      Samara Wren, APRN, CNP  [facility] Structural/Interventional Cardiology   Pager [phone]      Interval History:  - No acute events overnight  - patient reports slept well overnight, feels that he is getting stronger as he is allowed to move around in room more  - He denies any chest pain, does have some sternal wall  "pain when coughing  - He also denies any lightheadedness, dizziness, SOB, or DIOP  - Supplemental O2 taken off while in room with O2 sats 94-97% on room air    Most recent vital signs:  /63   Pulse 66   Temp 98.2  F (36.8  C) (Oral)   Resp 20   Ht 1.676 m (5' 5.98\")   Wt 66.7 kg (147 lb 0.8 oz)   SpO2 99%   BMI 23.75 kg/m    Temp:  [98  F (36.7  C)-99.4  F (37.4  C)] 98.2  F (36.8  C)  Pulse:  [64-81] 66  Resp:  [15-24] 20  BP: ()/(59-70) 106/63  SpO2:  [93 %-99 %] 99 %  Wt Readings from Last 2 Encounters:   11/03/23 66.7 kg (147 lb 0.8 oz)   10/09/23 67.1 kg (148 lb)       Intake/Output Summary (Last 24 hours) at 11/3/2023 0953  Last data filed at 11/3/2023 0451  Gross per 24 hour   Intake 650 ml   Output 675 ml   Net -25 ml       Physical exam:  General: In bed, in NAD  HEENT: EOMI, PERRLA, no scleral icterus or injection  CARDIAC: RRR, no m/r/g appreciated. Peripheral pulses 2+  RESP: CTAB, no wheezes, rhonchi or crackles appreciated.  GI: NABS, NT/ND, no guarding or rebound  EXTREMITIES: NO LE edema, pulses 2+. Radial and Femoral access site w/o bleeding, dressing c/d/i. No swelling, no signs of hematoma.   SKIN: No acute lesions appreciated  NEURO: Alert and oriented X3    Labs (Past three days):  CBC  Recent Labs   Lab 11/03/23  0604 11/02/23  0653 11/01/23  0356 10/31/23  0431   WBC 9.9 8.3 11.5* 10.7   RBC 2.97* 3.12* 3.35* 3.48*   HGB 9.5* 10.2* 10.9* 11.4*   HCT 28.8* 30.2* 32.1* 33.4*   MCV 97 97 96 96   MCH 32.0 32.7 32.5 32.8   MCHC 33.0 33.8 34.0 34.1   RDW 13.2 13.2 13.2 13.1    264 289 270     BMP  Recent Labs   Lab 11/03/23  0604 11/02/23  0653 11/02/23  0039 11/01/23  1402 11/01/23  0356 10/31/23  1632 10/31/23  0431 10/30/23  1550 10/30/23  0407 10/29/23  1341 10/29/23  0348    137 133*  --  138   < > 141   < > 139   < > 139   POTASSIUM 4.0 4.0 3.8  --  3.7   < > 3.9   < > 3.8   < > 3.9   CHLORIDE 104 104 101  --  105   < > 105   < > 104   < > 104   CO2 23 22 23  --  " "23   < > 25   < > 24   < > 21*   ANIONGAP 9 11 9  --  10   < > 11   < > 11   < > 14   GLC 95 94 106* 121* 105*   < > 92   < > 112*   < > 115*   BUN 26.8* 35.5* 39.4*  --  34.2*   < > 31.3*   < > 34.7*   < > 31.4*   CR 1.00 0.96 0.98  --  0.88   < > 1.05   < > 1.08   < > 1.05   GFRESTIMATED 79 83 81  --  >90   < > 75   < > 72   < > 75   PRANAV 8.5* 8.5* 8.5*  --  8.4*   < > 8.6*   < > 8.6*   < > 8.7*   MAG 2.2  --  2.1  --  2.0  --  2.0  --  1.9  --  2.1   PHOS  --   --  2.9  --  2.6  --   --   --  1.7*  --  4.5    < > = values in this interval not displayed.     Troponins:   Lab Results   Component Value Date    CTROPT 6,006 () 10/28/2023    CTROPT 7,354 () 10/27/2023    CTROPT 8,255 () 10/27/2023    CTROPT 7,637 () 10/27/2023    CTROPT 8,624 () 10/27/2023        INR  Recent Labs   Lab 10/27/23  1943   INR 1.23*     Liver panel  Recent Labs   Lab 11/01/23  0356 10/31/23  1632 10/31/23  0431 10/30/23  1550   PROTTOTAL 6.0* 6.6 6.3* 6.9   ALBUMIN 2.8* 3.2* 3.1* 3.4*   BILITOTAL 0.7 1.1 0.8 0.8   ALKPHOS 72 73 71 78   AST 72* 64* 67* 81*   ALT 44 42 41 45       Troponin T High Sensitivity No results found for: \"TROPI\", \"TROPONIN\", \"TROPR\", \"TROPN\"    Imaging/procedure results:  EKG 12 Lead 11/2/2023: NSR HR 74       ECHO 10/30/2023:  Interpretation Summary  Ischemic CM. Large LAD MI. Left ventricular function is decreased. The  ejection fraction is 20-30% (severely reduced).  Global right ventricular function is normal.  No significant valvular abnormalities present.  IVC diameter <2.1 cm collapsing >50% with sniff suggests a normal RA pressure  of 3 mmHg.  No pericardial effusion is present.  No significant changes noted.     Coronary Angiogram 10/26/2023:  Comments/Patient Narrative  74 y/o M with PMH of HTN, HLD, prostate Ca presents with anterolateral STEMI.      Pre Procedure Diagnosis  STEMI        Conclusion       1st Mrg lesion is 20% stenosed.    Prox LAD to Mid LAD lesion is 100% stenosed.    1st " Diag-1 lesion is 80% stenosed.    1st Diag-2 lesion is 50% stenosed.    Dist LAD lesion is 100% stenosed.    RPDA lesion is 70% stenosed.    Dist RCA lesion is 40% stenosed.     Anterior STEMI  Two vessel obstructive CAD (100% pLAD occlusion, 70% rPDA stenosis, 80% diagonal 1 stenosis)  PCI of pLAD to mLAD with BRENDA x 1 (Synergy 2.50x 28 mm) post dilated to 2.75 vessel  CVC placement in RIJ  LVEDP of 26 mmHg  Hemostasis of RRA with TR band      Coronary Findings  Diagnostic  Dominance: Right  Left Main   The vessel is large and is angiographically normal.      Left Anterior Descending   The vessel is large.   Prox LAD to Mid LAD lesion is 100% stenosed.   Dist LAD lesion is 100% stenosed.      First Diagonal Branch   1st Diag-1 lesion is 80% stenosed.   1st Diag-2 lesion is 50% stenosed.      Lateral First Diagonal Branch   The vessel is moderate in size. The vessel exhibits minimal luminal irregularities.      Left Circumflex   The vessel is large.      First Obtuse Marginal Branch   The vessel is moderate in size and is angiographically normal.   1st Mrg lesion is 20% stenosed.      Second Obtuse Marginal Branch   The vessel is moderate in size and is angiographically normal.      Third Obtuse Marginal Branch   The vessel is small and is angiographically normal.      Right Coronary Artery   The vessel is large.   Dist RCA lesion is 40% stenosed.      Right Posterior Descending Artery   The vessel is large. The vessel exhibits minimal luminal irregularities.   RPDA lesion is 70% stenosed.      First Right Posterolateral Branch   The vessel is large. The vessel exhibits minimal luminal irregularities.      Second Right Posterolateral Branch   The vessel is large. The vessel exhibits minimal luminal irregularities.         Intervention   Prox LAD to Mid LAD lesion   Stent   Lesion length: 20 mm. There is no pre-interventional antegrade distal flow (RAIZA 0). A stent was successfully placed. The post-interventional  distal flow is normal (RAIZA 3). Left main was engaged with a 6 Fr XB 3.0 GC. Heparin was given to maintain ACT > 250 s throughout the procedure. A 0.014''  50 wire was advanced across the p LAD occlusion to the diagonal 1. pLAD was predilated with an Emerge 2.50x8mm and an Emerge 2.50x20 mm balloon. A 0.014'' Runthrough wire was advanced to the distal LAD. Ostium of diagonal 1 was predilated with an Emerge 2.50x8 mm balloon.  50 wire was removed and pLAD to mLAD was stented with a Synergy 2.98m37gw BRENDA. BRENDA was post dilated with an NC Emerge 2.75x12 mm balloon. Post intervention angiogram showed no dissection or other complication.   There is a 0% residual stenosis post intervention.            Hemodynamics  LVEDP of 26 mmHg Left ventricular filling pressures are moderately elevated. Hemodynamic data has been modified in [facility] per physician review.      Coronary Angiogram 10/27/2023:  Coronary Findings  Diagnostic  Dominance: Right  Left Main   The vessel was visualized by selective angiography and is moderate in size. There was 0% vessel disease. Pressure wire/FFR was not performed on the vessel. IVUS was not performed on the vessel.      Left Anterior Descending   The vessel was visualized by selective angiography. There was 20% calcified vessel disease.      Ramus Intermedius   The vessel was visualized by selective angiography and is moderate in size. There was 0% vessel disease.      Left Circumflex   The vessel was visualized by selective angiography and is moderate in size. There was 15% vessel disease.      Right Coronary Artery   The vessel was visualized by selective angiography. There was 20% vessel disease           Medical Decision Making   50 MINUTES SPENT BY ME on the date of service doing chart review, history, exam, documentation & further activities per the note.

## 2023-11-03 NOTE — PROGRESS NOTES
Care Management Discharge Note    Discharge Date: 11/03/2023       Discharge Disposition: TCU    Discharge Services: None    Discharge DME: None    Discharge Transportation: family or friend will provide    Private pay costs discussed: Not applicable    Does the patient's insurance plan have a 3 day qualifying hospital stay waiver?  Yes     Which insurance plan 3 day waiver is available? [employer] REACH    Will the waiver be used for post-acute placement? Undetermined at this time    PAS Confirmation Code: [id number]  Patient/family educated on Medicare website which has current facility and service quality ratings: no (n/a)    Education Provided on the Discharge Plan: Yes  Persons Notified of Discharge Plans: patient, wife and nurse  Patient/Family in Agreement with the Plan:      Handoff Referral Completed: Yes    Additional Information:     Patient has discharge order in place.   Called wife for update on location, address and phone number of the facility. Wife was appreciative. She mentioned she will be coming around 2 pm to  patient.     Met with patient patient at beside to discuss discharge planning. Patient asked if writer can trust him and his wife at home.  Writer ask if he is open to go to the TCU and explain the reasons why he is going to the TCU and it is ment for a short stay. 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 Writer mentioned options for a chair lift at home and staying on the main level until he is stronger. Writer reinforced that the goal is for him to get stronger at the TCU with daily rehab so he gets to go home.  Patient  is in agreement with the plan.  Patient is currently on room air and will not need oxygen for the ride.      Updated bedside Nurse as well.   No further discharge need identified.    Destination to TCU   [facility]  [address] [facility] [address]    Nurse to Nurse #  [phone]    Hand off referral sent.     Elizabeth Cooper RN, PHN, BSN   [facility] Nurse Care Coordinator  Covering for [address]  Phone [phone]

## 2023-11-03 NOTE — PROGRESS NOTES
Care Management Follow Up    Length of Stay (days): 8    Expected Discharge Date: 11/03/2023     Concerns to be Addressed: discharge planning   Patient plan of care discussed at interdisciplinary rounds: Yes    Anticipated Discharge Disposition: TCU    [facility]  [address] [place]  [address]  P: [phone]  P: [phone] - Admissions  F: [phone]   nurse to nurse # [phone]     Anticipated Discharge Services: post-acute rehab services    Anticipated Discharge DME: None    Patient/family educated on Medicare website which has current facility and service quality ratings: no (n/a)  Education Provided on the Discharge Plan: Yes  Patient/Family in Agreement with the Plan:  Yes    Referrals Placed by CM/SW:     ACCEPTED:   [facility]  [address]  [place]  [address]  P: [phone]  P: [phone] - Admissions  F: [phone]   11/3: SW spoke with [facility]. They can accept pt today. Ideally would like pt there by 1600.      PENDING:  [facility]  [address] [place]  [address]  P: [phone]  P: [phone]  F: [phone]   11/3: SW left VM with Anne in Admissions requesting a call back.     [facility]  [address] [place]  [address]  P: [phone]  F: [phone]   11/3: SW left VM for  requesting call back.       Private pay costs discussed: Not applicable    Additional Information:  SW following for discharge planning. PT/OT currently recommending TCU. Per Cards CSI, pt remains medically ready for placement.     SW met with pt at bedside to discuss discharge plan. Pt's spouse (Melissa) was on speaker phone and involved during the conversation. SW shared that [facility] able [facility] pt. Pt agreeable. Pt and Melissa confirmed that Melissa could transport pt to [facility]. Pt wearing O2 during the visit and asking questions about how he would transport with O2.  STACIA to look into this. Melissa plans to be at hospital around 1400 and leave with pt around 1500 so pt will be at TCU around 1600. STACIA provided Melissa with address for [facility]. Melissa also requested phone number for facility and this was also provided to Melissa. STACIA to keep Melissa and pt updated should anything change.    STACIA messaged [facility] ([facility] with [facility] [address]) on discharge plan. [facility] agreeable and requesting discharge paperwork two hours prior to pt arrival.     CHW (Angi) completed PAS: [id number]. RNCC (Elizabeth) also assisted with discharge. Please see RNCC note from today for more details. Cards CSI (Samara Wren, TEMITOPE) notified about discharge plan via Vocera and reported that pt does not need O2 at discharge. Bedside RN notified by phone call of discharge plan.    STACIA faxed discharge orders to [facility] at 1327. Confirmed with [facility] that they were received.    Carmen Webb, MARTIN  [facility]   [facility]   6C  Ph: [phone]

## 2023-11-03 NOTE — PLAN OF CARE
Occupational Therapy Discharge Summary    Reason for therapy discharge:    Discharged to home.    Progress towards therapy goal(s). See goals on Care Plan in [facility] electronic health record for goal details.  Goals partially met.  Barriers to achieving goals:   limited tolerance for therapy.    Therapy recommendation(s):    Continued therapy is recommended.  Rationale/Recommendations:  [facility] OT.

## 2023-11-03 NOTE — PROGRESS NOTES
[facility] GERIATRICS  INITIAL VISIT NOTE      PRIMARY CARE PROVIDER AND CLINIC: Jose Coles [address]  / [facility] [address]    [facility] Medical Record Number: [id number]  [facility] of [facility] where encounter took [facility]) [[id number]]    Chief Complaint   Patient presents with    Hospital F/U     [facility] 10/26/2023 - 11/3/2023     HPI:    Duane C Johnson is a 73 year old (1950) male was admitted to the above facility from [facility]. Hospital stay 10/26/23 through 11/3/23 where they were admitted for NSTEMI. Now admitted to this facility for rehab, medical management, and nursing care.      History obtained from: facility chart records, facility staff, patient report, and [facility] chart review.      Brief Hospital Course: PMH of HTN, HLD, tobacco use, who presented with chest pain. EKG consistent with STEMI, underwent coronary angiogram with placement of BRENDA to LAD on 10/26. Developed VF/VT with cardiac x2 on 10/27. Had repeat angiogram on 10/27 with patent stents. Was started on amiodarone gtt, changed to PO. Echo on 10/30 showed EF of 20-30%, large LAD akinesis, normal IVC. Was started on Eliquis x 3 months, Brilinta x 1 month then change to plavix 1 year. Also started on metoprolol and losartan, Lipitor increased. ASA and Norvasc stopped. Developed productive cough, leukocytosis. Started on Rocephin 10/30-11/3 for probable aspiration PNA. Some concern for underlying TABATHA. Psych consulted for possible OCD/panic attacks, started on Klonopin BID and melatonin for sleep. When medically stable was discharged to TCU for further rehab and medical management.        TCU Course: Staff report has been having lower BP 90-100s since admission, from chart review appears this is consistent to BP while in hospital. Has been asymptomatic with this.  During visit he was drowsy, frequently falling asleep. Conversation was  appropriate. He did receive Klonipin about an hour before provider visit. Some concern that this may have caused some confusion while IP. Staff report he had some anxiety yesterday, and has not been sleeping well. Was apparently asking for Ativan. He report some chest discomfort form in his sternum and ribs from CPR. No SOB. Feels like he is eating ok. Is having bowel movements. SLUMS done and was 20/30.     CODE STATUS/ADVANCE DIRECTIVES: CPR/Full code     ALLERGIES:  No Known Allergies    PAST MEDICAL HISTORY:   Past Medical History:   Diagnosis Date    Hypertension      PAST SURGICAL HISTORY:   Past Surgical History:   Procedure Laterality Date    COLONOSCOPY N/A 3/23/2023    Procedure: COLONOSCOPY, FLEXIBLE, WITH LESION REMOVAL USING SNARE;  Surgeon: Jose Faustin MD;  Location: [facility]    CV CENTRAL VENOUS CATHETER PLACEMENT N/A 10/26/2023    Procedure: Central Venous Catheter Placement;  Surgeon: Rob Lyles MD;  Location:  HEART CARDIAC CATH LAB    CV CORONARY ANGIOGRAM N/A 10/27/2023    Procedure: Coronary Angiogram;  Surgeon: Rob Lyles MD;  Location:  HEART CARDIAC CATH LAB    CV CORONARY ANGIOGRAM N/A 10/26/2023    Procedure: Coronary Angiogram;  Surgeon: Rob Lyles MD;  Location:  HEART CARDIAC CATH LAB    CV LEFT HEART CATH N/A 10/26/2023    Procedure: Left Heart Catheterization;  Surgeon: Rob Lyles MD;  Location:  HEART CARDIAC CATH LAB    CV PCI N/A 10/27/2023    Procedure: Percutaneous Coronary Intervention;  Surgeon: Rob Lyles MD;  Location:  HEART CARDIAC CATH LAB    CV PCI STENT DRUG ELUTING N/A 10/26/2023    Procedure: Percutaneous Coronary Intervention Stent;  Surgeon: Rob Lyles MD;  Location:  HEART CARDIAC CATH LAB     FAMILY HISTORY:   Family History   Problem Relation Age of Onset    Other Cancer Mother     Obesity Mother     Cervical Cancer Sister     GI problems Father     Other Cancer Sister   "        SOCIAL HISTORY:   Patient's living condition: lives with spouse    MEDICATIONS  Post Discharge Medication Reconciliation Status: discharge medications reconciled and changed, per note/orders.  Current Outpatient Medications   Medication Sig Dispense Refill    acetaminophen (TYLENOL) 325 MG tablet Take 2 tablets (650 mg) by mouth every 6 hours as needed      clonazePAM (KLONOPIN) 0.5 MG tablet Take 1 tablet (0.5 mg) by mouth at bedtime. May also take 1 tablet (0.5 mg) daily as needed for anxiety.      [START ON 11/8/2023] amiodarone (PACERONE) 200 MG tablet Take 1 tablet (200 mg) by mouth daily      amiodarone (PACERONE) 400 MG tablet Take 1 tablet (400 mg) by mouth 2 times daily for 4 days      apixaban ANTICOAGULANT (ELIQUIS) 5 MG tablet Take 1 tablet (5 mg) by mouth 2 times daily      atorvastatin (LIPITOR) 80 MG tablet Take 1 tablet (80 mg) by mouth daily 90 tablet 3    cetirizine (ZYRTEC) 10 MG tablet Take 10 mg by mouth daily      fish oil-omega-3 fatty acids 1000 MG capsule Take 1 g by mouth 2 times daily Also contains another supplement      losartan (COZAAR) 25 MG tablet Take 0.5 tablets (12.5 mg) by mouth every evening      melatonin 10 MG TABS tablet Take 1 tablet (10 mg) by mouth at bedtime      metoprolol succinate ER (TOPROL XL) 25 MG 24 hr tablet Take 1 tablet (25 mg) by mouth daily      multivitamin w/minerals (THERA-VIT-M) tablet Take 1 tablet by mouth daily      ticagrelor (BRILINTA) 90 MG tablet Take 1 tablet (90 mg) by mouth every 12 hours      vitamin C (ASCORBIC ACID) 1000 MG TABS Take 1,000 mg by mouth       ROS:  10 point ROS neg other than the symptoms noted above in the HPI.      PHYSICAL EXAM:  /63   Pulse 64   Temp 98.3  F (36.8  C)   Resp 18   Ht 1.676 m (5' 6\")   Wt 67.1 kg (148 lb)   SpO2 95%   BMI 23.89 kg/m    Physical Exam  Cardiovascular:      Rate and Rhythm: Normal rate and regular rhythm.      Heart sounds: Normal heart sounds.   Pulmonary:      Effort: " Pulmonary effort is normal.      Breath sounds: Normal breath sounds.      Comments: On 1L NC, occasional cough  Musculoskeletal:      Right lower leg: No edema.      Left lower leg: No edema.   Neurological:      General: No focal deficit present.      Mental Status: He is alert.   Psychiatric:         Mood and Affect: Mood normal.      Comments: Drowsy; memory and judgement fair          LABORATORY/IMAGING DATA:  Reviewed as per [facility] and/or [facility]    ASSESSMENT/PLAN:  ST elevation myocardial infarction involving left anterior descending (LAD) coronary artery (H)  S/P drug eluting coronary stent placement  Acute respiratory failure  S/p BRENDA to LAD, Vfib/Vtach arrest x2; Was weaned to RA during PT today. Denies any chest pain.  Discussed with patient, nursing staff.   - continue amiodarone 400mg daily through 11/7, then start 200mg daily  - continue metoprolol, atorvastatin, apixaban, Brilinta  - follow-up with cardiology on 11/20    Primary hypertension  Hyperlipidemia LDL goal <130  Systolic heart failure, unspecified HF chronicity (H)  Echo showed EF of 20-30%, new on metoprolol, losartan.  Discussed with patient, nursing staff. BP 90s-100s.   - continue metoprolol and losartan will add hold parameters;   - low salt diet  - daily weights  - follow-up with [facility] on 11/8    Panic attack  Anxiety  Drowsy on visit today, suspect this is due to Klonipin, which is new from the hospital. VSS.  Discussed with patient, nursing staff.   - decreased Klonipin to at 0.5mg bedtime and 0.5mg daily PRN     Acute on chronic anemia  - check Hgb for stability on 11/9    Insomnia, unspecified type  Sleep variable since TCU admission; could be contributing to drowsiness this AM  - encourage normal sleep patterns, melatonin at bedtime   - continue Klonipin just at HS  - monitor and adjust     Physical deconditioning  Was able to do stairs with therapy today but needs safety cues. Discussed with therapy.   - PT/OT  -   to assist with discharge planning.         Orders:   Hold metoprolol for SBP <95, HR <55  Hold losartan for SBP <100  BMP, Hgb on 11/9 DX CHF, anemia  When O2 to RA  Change Klonipin to 0.5mg at bedtime and 0.5mg daily Prn x 14 days    Total time spent with patient visit at the skilled nursing facility was 50 minutes including patient visit and review of past records. Total time spent reviewing records from hospitalization within my organization including review of labs, review of TCU facility records, medication reconciliation discussion of plan of care with nursing staff and therapy, time spent on documentation as well as discussion with patient including review of medications, discussion of plan of care and patient education as stated above.       Electronically signed by:  NADEGE Haq CNP

## 2023-11-03 NOTE — PROGRESS NOTES
Physical Therapy Discharge Summary    Reason for therapy discharge:    Discharged to transitional care facility.    Progress towards therapy goal(s). See goals on Care Plan in [facility] electronic health record for goal details.  Goals not met.  Barriers to achieving goals:   discharge from facility.    Therapy recommendation(s):    Continued therapy is recommended.  Rationale/Recommendations:  progress functional I, activity tolerance and safety.

## 2023-11-03 NOTE — PROGRESS NOTES
CHW completed [id number].    Angi Simmons   [address] Community Health Worker   Phone: [phone]

## 2023-11-03 NOTE — PLAN OF CARE
Neuro: A&Ox4. Make calls appropriately  Cardiac: NSR   Respiratory: RA  GI/: Voiding spontaneously. BM x1  Diet/appetite: Tolerating low sat fat, low Na diet. Fair appetite.   Activity: Up with standby assist.   Pain: midsternum with exertion  Skin: No new deficits noted.  Lines: RPIV Saline locked.   Plan: TCU placement once room is available.   Cont to monitor and update Primary Team with changes.

## 2023-11-03 NOTE — PLAN OF CARE
Goal Outcome Evaluation:      Plan of Care Reviewed With: patient    Overall Patient Progress: improvingOverall Patient Progress: improving    Pain: Denies pain  Neuro: A&Ox3, disoriented to time.    Cardiac: SR. HR and BP stable.   Respiratory: On 2L NC at overnight. Dyspnea upon exertion and SOB with activities.    Diet/Appetite: Low saturated fat, Na <2400 mg.   /GI: Total UOP: 275, bladder scan q4h if not voiding. Pt refused bladder scan this morning. No BM this shift.    LDA's: Left forearm PIV saline locked.   Skin: No new deficits noted.    Activity: Up with stand by assist     Plan: TCU placement

## 2023-11-05 ENCOUNTER — LAB REQUISITION (OUTPATIENT)
Dept: LAB | Facility: CLINIC | Age: 73
End: 2023-11-05

## 2023-11-05 ENCOUNTER — TELEPHONE (OUTPATIENT)
Dept: GERIATRICS | Facility: CLINIC | Age: 73
End: 2023-11-05
Payer: MEDICARE

## 2023-11-05 DIAGNOSIS — F41.9 ANXIETY: Primary | ICD-10-CM

## 2023-11-05 DIAGNOSIS — R76.12 NONSPECIFIC REACTION TO CELL MEDIATED IMMUNITY MEASUREMENT OF GAMMA INTERFERON ANTIGEN RESPONSE WITHOUT ACTIVE TUBERCULOSIS: ICD-10-CM

## 2023-11-05 RX ORDER — CLONAZEPAM 0.5 MG/1
0.5 TABLET ORAL 2 TIMES DAILY
Qty: 4 TABLET | Refills: 0 | Status: SHIPPED | OUTPATIENT
Start: 2023-11-05 | End: 2023-11-06

## 2023-11-05 NOTE — CONFIDENTIAL NOTE
Patient having extreme anxiety  Was evaluated by psych in hospital and started on Klonopin 0.5 mg BID. No reason for discontinuing at time of discharge.     Plan:  start Klonopin 0.5 mg BID

## 2023-11-06 ENCOUNTER — TRANSITIONAL CARE UNIT VISIT (OUTPATIENT)
Dept: GERIATRICS | Facility: CLINIC | Age: 73
End: 2023-11-06
Payer: MEDICARE

## 2023-11-06 ENCOUNTER — PATIENT OUTREACH (OUTPATIENT)
Dept: CARE COORDINATION | Facility: CLINIC | Age: 73
End: 2023-11-06

## 2023-11-06 ENCOUNTER — HOSPITAL ENCOUNTER (INPATIENT)
Facility: CLINIC | Age: 73
LOS: 5 days | Discharge: HOME HEALTH | DRG: 196 | End: 2023-11-12
Attending: EMERGENCY MEDICINE | Admitting: INTERNAL MEDICINE
Payer: MEDICARE

## 2023-11-06 ENCOUNTER — DOCUMENTATION ONLY (OUTPATIENT)
Dept: CARDIOLOGY | Facility: CLINIC | Age: 73
End: 2023-11-06

## 2023-11-06 ENCOUNTER — APPOINTMENT (OUTPATIENT)
Dept: GENERAL RADIOLOGY | Facility: CLINIC | Age: 73
DRG: 196 | End: 2023-11-06
Attending: EMERGENCY MEDICINE
Payer: MEDICARE

## 2023-11-06 ENCOUNTER — APPOINTMENT (OUTPATIENT)
Dept: CT IMAGING | Facility: CLINIC | Age: 73
DRG: 196 | End: 2023-11-06
Attending: EMERGENCY MEDICINE
Payer: MEDICARE

## 2023-11-06 VITALS
RESPIRATION RATE: 18 BRPM | DIASTOLIC BLOOD PRESSURE: 63 MMHG | OXYGEN SATURATION: 95 % | BODY MASS INDEX: 23.78 KG/M2 | SYSTOLIC BLOOD PRESSURE: 101 MMHG | TEMPERATURE: 98.3 F | WEIGHT: 148 LBS | HEIGHT: 66 IN | HEART RATE: 64 BPM

## 2023-11-06 DIAGNOSIS — Z95.5 S/P DRUG ELUTING CORONARY STENT PLACEMENT: ICD-10-CM

## 2023-11-06 DIAGNOSIS — I21.02 ST ELEVATION MYOCARDIAL INFARCTION INVOLVING LEFT ANTERIOR DESCENDING (LAD) CORONARY ARTERY (H): ICD-10-CM

## 2023-11-06 DIAGNOSIS — R53.81 PHYSICAL DECONDITIONING: ICD-10-CM

## 2023-11-06 DIAGNOSIS — J18.9 MULTIFOCAL PNEUMONIA: Primary | ICD-10-CM

## 2023-11-06 DIAGNOSIS — G47.00 INSOMNIA, UNSPECIFIED TYPE: ICD-10-CM

## 2023-11-06 DIAGNOSIS — I50.21 ACUTE SYSTOLIC HEART FAILURE (H): ICD-10-CM

## 2023-11-06 DIAGNOSIS — J96.01 ACUTE RESPIRATORY FAILURE WITH HYPOXIA (H): ICD-10-CM

## 2023-11-06 DIAGNOSIS — R06.02 SOB (SHORTNESS OF BREATH): ICD-10-CM

## 2023-11-06 DIAGNOSIS — I25.10 CORONARY ARTERY DISEASE DUE TO CALCIFIED CORONARY LESION: ICD-10-CM

## 2023-11-06 DIAGNOSIS — D64.9 ACUTE ON CHRONIC ANEMIA: ICD-10-CM

## 2023-11-06 DIAGNOSIS — J18.9 PNEUMONIA OF LEFT UPPER LOBE DUE TO INFECTIOUS ORGANISM: ICD-10-CM

## 2023-11-06 DIAGNOSIS — F41.9 ANXIETY: ICD-10-CM

## 2023-11-06 DIAGNOSIS — I46.9 CARDIAC ARREST (H): ICD-10-CM

## 2023-11-06 DIAGNOSIS — I10 PRIMARY HYPERTENSION: ICD-10-CM

## 2023-11-06 DIAGNOSIS — I50.9 CONGESTIVE HEART FAILURE, UNSPECIFIED HF CHRONICITY, UNSPECIFIED HEART FAILURE TYPE (H): ICD-10-CM

## 2023-11-06 DIAGNOSIS — E78.5 HYPERLIPIDEMIA LDL GOAL <130: ICD-10-CM

## 2023-11-06 DIAGNOSIS — I25.84 CORONARY ARTERY DISEASE DUE TO CALCIFIED CORONARY LESION: ICD-10-CM

## 2023-11-06 DIAGNOSIS — F41.0 PANIC ATTACK: ICD-10-CM

## 2023-11-06 DIAGNOSIS — I10 BENIGN ESSENTIAL HYPERTENSION: ICD-10-CM

## 2023-11-06 DIAGNOSIS — I21.02 ST ELEVATION MYOCARDIAL INFARCTION INVOLVING LEFT ANTERIOR DESCENDING (LAD) CORONARY ARTERY (H): Primary | ICD-10-CM

## 2023-11-06 DIAGNOSIS — F17.200 TOBACCO DEPENDENCE SYNDROME: ICD-10-CM

## 2023-11-06 DIAGNOSIS — I50.20 SYSTOLIC HEART FAILURE, UNSPECIFIED HF CHRONICITY (H): ICD-10-CM

## 2023-11-06 DIAGNOSIS — I21.3 ST ELEVATION MYOCARDIAL INFARCTION (STEMI), UNSPECIFIED ARTERY (H): Primary | ICD-10-CM

## 2023-11-06 LAB
ANION GAP SERPL CALCULATED.3IONS-SCNC: 11 MMOL/L (ref 7–15)
ATRIAL RATE - MUSE: 74 BPM
BASE EXCESS BLDV CALC-SCNC: 0.6 MMOL/L (ref -7.7–1.9)
BASOPHILS # BLD AUTO: 0.1 10E3/UL (ref 0–0.2)
BASOPHILS NFR BLD AUTO: 0 %
BUN SERPL-MCNC: 16.6 MG/DL (ref 8–23)
CALCIUM SERPL-MCNC: 8.3 MG/DL (ref 8.8–10.2)
CHLORIDE SERPL-SCNC: 102 MMOL/L (ref 98–107)
CREAT SERPL-MCNC: 0.9 MG/DL (ref 0.67–1.17)
DEPRECATED HCO3 PLAS-SCNC: 21 MMOL/L (ref 22–29)
DIASTOLIC BLOOD PRESSURE - MUSE: NORMAL MMHG
EGFRCR SERPLBLD CKD-EPI 2021: 90 ML/MIN/1.73M2
EOSINOPHIL # BLD AUTO: 0.2 10E3/UL (ref 0–0.7)
EOSINOPHIL NFR BLD AUTO: 1 %
ERYTHROCYTE [DISTWIDTH] IN BLOOD BY AUTOMATED COUNT: 12.7 % (ref 10–15)
GLUCOSE SERPL-MCNC: 122 MG/DL (ref 70–99)
HCO3 BLDV-SCNC: 26 MMOL/L (ref 21–28)
HCT VFR BLD AUTO: 32.2 % (ref 40–53)
HGB BLD-MCNC: 10.8 G/DL (ref 13.3–17.7)
IMM GRANULOCYTES # BLD: 0.1 10E3/UL
IMM GRANULOCYTES NFR BLD: 0 %
INTERPRETATION ECG - MUSE: NORMAL
LYMPHOCYTES # BLD AUTO: 1.1 10E3/UL (ref 0.8–5.3)
LYMPHOCYTES NFR BLD AUTO: 8 %
MCH RBC QN AUTO: 32.5 PG (ref 26.5–33)
MCHC RBC AUTO-ENTMCNC: 33.5 G/DL (ref 31.5–36.5)
MCV RBC AUTO: 97 FL (ref 78–100)
MONOCYTES # BLD AUTO: 0.7 10E3/UL (ref 0–1.3)
MONOCYTES NFR BLD AUTO: 5 %
NEUTROPHILS # BLD AUTO: 11.6 10E3/UL (ref 1.6–8.3)
NEUTROPHILS NFR BLD AUTO: 86 %
NRBC # BLD AUTO: 0 10E3/UL
NRBC BLD AUTO-RTO: 0 /100
NT-PROBNP SERPL-MCNC: ABNORMAL PG/ML (ref 0–900)
O2/TOTAL GAS SETTING VFR VENT: 36 %
P AXIS - MUSE: 42 DEGREES
PCO2 BLDV: 47 MM HG (ref 40–50)
PH BLDV: 7.36 [PH] (ref 7.32–7.43)
PLATELET # BLD AUTO: 406 10E3/UL (ref 150–450)
PO2 BLDV: 19 MM HG (ref 25–47)
POTASSIUM SERPL-SCNC: 4.6 MMOL/L (ref 3.4–5.3)
PR INTERVAL - MUSE: 180 MS
QRS DURATION - MUSE: 92 MS
QT - MUSE: 386 MS
QTC - MUSE: 428 MS
R AXIS - MUSE: -18 DEGREES
RBC # BLD AUTO: 3.32 10E6/UL (ref 4.4–5.9)
SODIUM SERPL-SCNC: 134 MMOL/L (ref 135–145)
SYSTOLIC BLOOD PRESSURE - MUSE: NORMAL MMHG
T AXIS - MUSE: -21 DEGREES
VENTRICULAR RATE- MUSE: 74 BPM
WBC # BLD AUTO: 13.6 10E3/UL (ref 4–11)

## 2023-11-06 PROCEDURE — 36415 COLL VENOUS BLD VENIPUNCTURE: CPT | Performed by: EMERGENCY MEDICINE

## 2023-11-06 PROCEDURE — G1010 CDSM STANSON: HCPCS

## 2023-11-06 PROCEDURE — 86481 TB AG RESPONSE T-CELL SUSP: CPT | Performed by: NURSE PRACTITIONER

## 2023-11-06 PROCEDURE — 99285 EMERGENCY DEPT VISIT HI MDM: CPT | Mod: 25 | Performed by: EMERGENCY MEDICINE

## 2023-11-06 PROCEDURE — 99310 SBSQ NF CARE HIGH MDM 45: CPT | Performed by: NURSE PRACTITIONER

## 2023-11-06 PROCEDURE — 99285 EMERGENCY DEPT VISIT HI MDM: CPT | Mod: 25

## 2023-11-06 PROCEDURE — P9604 ONE-WAY ALLOW PRORATED TRIP: HCPCS | Performed by: NURSE PRACTITIONER

## 2023-11-06 PROCEDURE — 96365 THER/PROPH/DIAG IV INF INIT: CPT

## 2023-11-06 PROCEDURE — 80048 BASIC METABOLIC PNL TOTAL CA: CPT | Performed by: EMERGENCY MEDICINE

## 2023-11-06 PROCEDURE — 85025 COMPLETE CBC W/AUTO DIFF WBC: CPT | Performed by: EMERGENCY MEDICINE

## 2023-11-06 PROCEDURE — 93010 ELECTROCARDIOGRAM REPORT: CPT | Performed by: EMERGENCY MEDICINE

## 2023-11-06 PROCEDURE — 93005 ELECTROCARDIOGRAM TRACING: CPT

## 2023-11-06 PROCEDURE — 82803 BLOOD GASES ANY COMBINATION: CPT | Performed by: EMERGENCY MEDICINE

## 2023-11-06 PROCEDURE — 83880 ASSAY OF NATRIURETIC PEPTIDE: CPT | Performed by: EMERGENCY MEDICINE

## 2023-11-06 PROCEDURE — 96366 THER/PROPH/DIAG IV INF ADDON: CPT

## 2023-11-06 PROCEDURE — 71046 X-RAY EXAM CHEST 2 VIEWS: CPT

## 2023-11-06 PROCEDURE — 36415 COLL VENOUS BLD VENIPUNCTURE: CPT | Performed by: NURSE PRACTITIONER

## 2023-11-06 RX ORDER — CLONAZEPAM 0.5 MG/1
TABLET ORAL
Status: ON HOLD
Start: 2023-11-06 | End: 2023-11-12

## 2023-11-06 RX ORDER — ACETAMINOPHEN 325 MG/1
650 TABLET ORAL EVERY 6 HOURS PRN
COMMUNITY
Start: 2023-11-06 | End: 2024-04-04

## 2023-11-06 ASSESSMENT — ACTIVITIES OF DAILY LIVING (ADL)
ADLS_ACUITY_SCORE: 35
DEPENDENT_IADLS:: INDEPENDENT

## 2023-11-06 NOTE — LETTER
11/6/2023        RE: Duane C Johnson  [address] [facility] [address]        [facility] GERIATRICS  INITIAL VISIT NOTE      PRIMARY CARE PROVIDER AND CLINIC: Jose Coles [address] [facility] [address]    [facility] Medical Record Number: [id number]  Place of Service where encounter took place: [facility]) [[id number]]    Chief Complaint   Patient presents with     Hospital F/U     [facility] 10/26/2023 - 11/3/2023     HPI:    Duane C Johnson is a 73 year old (1950) male was admitted to the above facility from [facility]. Hospital stay 10/26/23 through 11/3/23 where they were admitted for NSTEMI. Now admitted to this facility for rehab, medical management, and nursing care.      History obtained from: facility chart records, facility staff, patient report, and [facility] chart review.      Brief Hospital Course: PMH of HTN, HLD, tobacco use, who presented with chest pain. EKG consistent with STEMI, underwent coronary angiogram with placement of BRENDA to LAD on 10/26. Developed VF/VT with cardiac x2 on 10/27. Had repeat angiogram on 10/27 with patent stents. Was started on amiodarone gtt, changed to PO. Echo on 10/30 showed EF of 20-30%, large LAD akinesis, normal IVC. Was started on Eliquis x 3 months, Brilinta x 1 month then change to plavix 1 year. Also started on metoprolol and losartan, Lipitor increased. ASA and Norvasc stopped. Developed productive cough, leukocytosis. Started on Rocephin 10/30-11/3 for probable aspiration PNA. Some concern for underlying TABATHA. Psych consulted for possible OCD/panic attacks, started on Klonopin BID and melatonin for sleep. When medically stable was discharged to TCU for further rehab and medical management.        TCU Course: Staff report has been having lower BP 90-100s since admission, from chart review appears this is consistent to BP while in hospital. Has been  asymptomatic with this.  During visit he was drowsy, frequently falling asleep. Conversation was appropriate. He did receive Klonipin about an hour before provider visit. Some concern that this may have caused some confusion while IP. Staff report he had some anxiety yesterday, and has not been sleeping well. Was apparently asking for Ativan. He report some chest discomfort form in his sternum and ribs from CPR. No SOB. Feels like he is eating ok. Is having bowel movements. SLUMS done and was 20/30.     CODE STATUS/ADVANCE DIRECTIVES: CPR/Full code     ALLERGIES:  No Known Allergies    PAST MEDICAL HISTORY:   Past Medical History:   Diagnosis Date     Hypertension      PAST SURGICAL HISTORY:   Past Surgical History:   Procedure Laterality Date     COLONOSCOPY N/A 3/23/2023    Procedure: COLONOSCOPY, FLEXIBLE, WITH LESION REMOVAL USING SNARE;  Surgeon: Jose Faustin MD;  Location: [facility]     CV CENTRAL VENOUS CATHETER PLACEMENT N/A 10/26/2023    Procedure: Central Venous Catheter Placement;  Surgeon: Rob Lyles MD;  Location: [facility] CARDIAC CATH LAB     CV CORONARY ANGIOGRAM N/A 10/27/2023    Procedure: Coronary Angiogram;  Surgeon: Rob Lyles MD;  Location: [facility] CARDIAC CATH LAB     CV CORONARY ANGIOGRAM N/A 10/26/2023    Procedure: Coronary Angiogram;  Surgeon: Rob Lyles MD;  Location: [facility] CARDIAC CATH LAB     CV LEFT HEART CATH N/A 10/26/2023    Procedure: Left Heart Catheterization;  Surgeon: Rob Lyles MD;  Location: [facility] CARDIAC CATH LAB     CV PCI N/A 10/27/2023    Procedure: Percutaneous Coronary Intervention;  Surgeon: Rob Lyles MD;  Location: [facility] CARDIAC CATH LAB     CV PCI STENT DRUG ELUTING N/A 10/26/2023    Procedure: Percutaneous Coronary Intervention Stent;  Surgeon: Rob Lyles MD;  Location: [facility] CARDIAC CATH LAB     FAMILY HISTORY:   Family History   Problem Relation Age of Onset     Other  "Cancer Mother      Obesity Mother      Cervical Cancer Sister      GI problems Father      Other Cancer Sister          SOCIAL HISTORY:   Patient's living condition: lives with spouse    MEDICATIONS  Post Discharge Medication Reconciliation Status: discharge medications reconciled and changed, per note/orders.  Current Outpatient Medications   Medication Sig Dispense Refill     acetaminophen (TYLENOL) 325 MG tablet Take 2 tablets (650 mg) by mouth every 6 hours as needed       clonazePAM (KLONOPIN) 0.5 MG tablet Take 1 tablet (0.5 mg) by mouth at bedtime. May also take 1 tablet (0.5 mg) daily as needed for anxiety.       [START ON 11/8/2023] amiodarone (PACERONE) 200 MG tablet Take 1 tablet (200 mg) by mouth daily       amiodarone (PACERONE) 400 MG tablet Take 1 tablet (400 mg) by mouth 2 times daily for 4 days       apixaban ANTICOAGULANT (ELIQUIS) 5 MG tablet Take 1 tablet (5 mg) by mouth 2 times daily       atorvastatin (LIPITOR) 80 MG tablet Take 1 tablet (80 mg) by mouth daily 90 tablet 3     cetirizine (ZYRTEC) 10 MG tablet Take 10 mg by mouth daily       fish oil-omega-3 fatty acids 1000 MG capsule Take 1 g by mouth 2 times daily Also contains another supplement       losartan (COZAAR) 25 MG tablet Take 0.5 tablets (12.5 mg) by mouth every evening       melatonin 10 MG TABS tablet Take 1 tablet (10 mg) by mouth at bedtime       metoprolol succinate ER (TOPROL XL) 25 MG 24 hr tablet Take 1 tablet (25 mg) by mouth daily       multivitamin w/minerals (THERA-VIT-M) tablet Take 1 tablet by mouth daily       ticagrelor (BRILINTA) 90 MG tablet Take 1 tablet (90 mg) by mouth every 12 hours       vitamin C (ASCORBIC ACID) 1000 MG TABS Take 1,000 mg by mouth       ROS:  10 point ROS neg other than the symptoms noted above in the HPI.      PHYSICAL EXAM:  /63   Pulse 64   Temp 98.3  F (36.8  C)   Resp 18   Ht 1.676 m (5' 6\")   Wt 67.1 kg (148 lb)   SpO2 95%   BMI 23.89 kg/m    Physical Exam  Cardiovascular: "      Rate and Rhythm: Normal rate and regular rhythm.      Heart sounds: Normal heart sounds.   Pulmonary:      Effort: Pulmonary effort is normal.      Breath sounds: Normal breath sounds.      Comments: On 1L NC, occasional cough  Musculoskeletal:      Right lower leg: No edema.      Left lower leg: No edema.   Neurological:      General: No focal deficit present.      Mental Status: He is alert.   Psychiatric:         Mood and Affect: Mood normal.      Comments: Drowsy; memory and judgement fair          LABORATORY/IMAGING DATA:  Reviewed as per [facility] and/or [facility]    ASSESSMENT/PLAN:  ST elevation myocardial infarction involving left anterior descending (LAD) coronary artery (H)  S/P drug eluting coronary stent placement  Acute respiratory failure  S/p BRENDA to LAD, Vfib/Vtach arrest x2; Was weaned to RA during PT today. Denies any chest pain.  Discussed with patient, nursing staff.   - continue amiodarone 400mg daily through 11/7, then start 200mg daily  - continue metoprolol, atorvastatin, apixaban, Brilinta  - follow-up with cardiology on 11/20    Primary hypertension  Hyperlipidemia LDL goal <130  Systolic heart failure, unspecified HF chronicity (H)  Echo showed EF of 20-30%, new on metoprolol, losartan.  Discussed with patient, nursing staff. BP 90s-100s.   - continue metoprolol and losartan will add hold parameters;   - low salt diet  - daily weights  - follow-up with [facility] on 11/8    Panic attack  Anxiety  Drowsy on visit today, suspect this is due to Klonipin, which is new from the hospital. VSS.  Discussed with patient, nursing staff.   - decreased Klonipin to at 0.5mg bedtime and 0.5mg daily PRN     Acute on chronic anemia  - check Hgb for stability on 11/9    Insomnia, unspecified type  Sleep variable since TCU admission; could be contributing to drowsiness this AM  - encourage normal sleep patterns, melatonin at bedtime   - continue Klonipin just at HS  - monitor and adjust     Physical  deconditioning  Was able to do stairs with therapy today but needs safety cues. Discussed with therapy.   - PT/OT  -  to assist with discharge planning.         Orders:   Hold metoprolol for SBP <95, HR <55  Hold losartan for SBP <100  BMP, Hgb on 11/9 DX CHF, anemia  When O2 to RA  Change Klonipin to 0.5mg at bedtime and 0.5mg daily Prn x 14 days    Total time spent with patient visit at the skilled nursing facility was 50 minutes including patient visit and review of past records. Total time spent reviewing records from hospitalization within my organization including review of labs, review of TCU facility records, medication reconciliation discussion of plan of care with nursing staff and therapy, time spent on documentation as well as discussion with patient including review of medications, discussion of plan of care and patient education as stated above.       Electronically signed by:  NADEGE Haq CNP       Sincerely,        NADEGE Haq CNP

## 2023-11-06 NOTE — PROGRESS NOTES
Clinic Care Coordination Contact  Care Coordination Transition Communication    Clinical Data: Patient was hospitalized at [facility] from 10/26/23 to 11/3/23 with diagnosis of heart attack, cardiac arrest.     Assessment: Patient has transitioned to TCU for short term rehabilitation:    [facility]  Transition Communication:  Notified facility of Primary Care- Care Coordination support via TCU hand in email.    Plan: RN Care Coordinator will await notification from facility staff informing of patient's discharge plans/needs. RN Care Coordinator will review chart and outreach to facility staff every 2-4 weeks and as needed.     DMITRY Cagle   Social Work Primary Care Clinic Care Coordinator   [facility]  Covering for RN ALVIN Paiz

## 2023-11-06 NOTE — PROGRESS NOTES
Hello,   In order to offer our patients the best possible experience, the lab schedule is reviewed to ensure patients have lab orders in Epic prior to arriving at the lab.    Please have one of your team members place a lab order in Epic for this patient prior to the lab appointment date.     Thank you for your attention,   [facility] [phone]

## 2023-11-07 ENCOUNTER — PATIENT OUTREACH (OUTPATIENT)
Dept: CARE COORDINATION | Facility: CLINIC | Age: 73
End: 2023-11-07
Payer: MEDICARE

## 2023-11-07 ENCOUNTER — APPOINTMENT (OUTPATIENT)
Dept: PHYSICAL THERAPY | Facility: CLINIC | Age: 73
DRG: 196 | End: 2023-11-07
Payer: MEDICARE

## 2023-11-07 ENCOUNTER — APPOINTMENT (OUTPATIENT)
Dept: CT IMAGING | Facility: CLINIC | Age: 73
DRG: 196 | End: 2023-11-07
Payer: MEDICARE

## 2023-11-07 ENCOUNTER — APPOINTMENT (OUTPATIENT)
Dept: OCCUPATIONAL THERAPY | Facility: CLINIC | Age: 73
DRG: 196 | End: 2023-11-07
Payer: MEDICARE

## 2023-11-07 PROBLEM — R79.89 ELEVATED BRAIN NATRIURETIC PEPTIDE (BNP) LEVEL: Status: ACTIVE | Noted: 2023-11-07

## 2023-11-07 PROBLEM — J18.9 MULTIFOCAL PNEUMONIA: Status: ACTIVE | Noted: 2023-11-07

## 2023-11-07 PROBLEM — I10 PRIMARY HYPERTENSION: Status: ACTIVE | Noted: 2023-11-07

## 2023-11-07 PROBLEM — I21.3 ST ELEVATION MYOCARDIAL INFARCTION (STEMI), UNSPECIFIED ARTERY (H): Status: RESOLVED | Noted: 2023-10-26 | Resolved: 2023-11-07

## 2023-11-07 PROBLEM — J96.01 ACUTE RESPIRATORY FAILURE WITH HYPOXIA (H): Status: ACTIVE | Noted: 2023-11-07

## 2023-11-07 PROBLEM — I25.10 CORONARY ARTERY DISEASE DUE TO CALCIFIED CORONARY LESION: Status: ACTIVE | Noted: 2023-11-07

## 2023-11-07 PROBLEM — R06.02 SOB (SHORTNESS OF BREATH): Status: ACTIVE | Noted: 2023-11-07

## 2023-11-07 PROBLEM — I25.84 CORONARY ARTERY DISEASE DUE TO CALCIFIED CORONARY LESION: Status: ACTIVE | Noted: 2023-11-07

## 2023-11-07 PROBLEM — J18.9 PNEUMONIA OF LEFT UPPER LOBE DUE TO INFECTIOUS ORGANISM: Status: ACTIVE | Noted: 2023-11-07

## 2023-11-07 PROBLEM — I50.20 SYSTOLIC CHF (H): Status: ACTIVE | Noted: 2023-11-07

## 2023-11-07 PROBLEM — I50.9 CONGESTIVE HEART FAILURE, UNSPECIFIED HF CHRONICITY, UNSPECIFIED HEART FAILURE TYPE (H): Status: ACTIVE | Noted: 2023-11-07

## 2023-11-07 LAB
ALBUMIN UR-MCNC: NEGATIVE MG/DL
ANION GAP SERPL CALCULATED.3IONS-SCNC: 6 MMOL/L (ref 7–15)
APPEARANCE UR: CLEAR
BILIRUB UR QL STRIP: NEGATIVE
BUN SERPL-MCNC: 14.3 MG/DL (ref 8–23)
CALCIUM SERPL-MCNC: 8.5 MG/DL (ref 8.8–10.2)
CHLORIDE SERPL-SCNC: 106 MMOL/L (ref 98–107)
COLOR UR AUTO: COLORLESS
CREAT SERPL-MCNC: 0.88 MG/DL (ref 0.67–1.17)
DEPRECATED HCO3 PLAS-SCNC: 26 MMOL/L (ref 22–29)
EGFRCR SERPLBLD CKD-EPI 2021: >90 ML/MIN/1.73M2
ERYTHROCYTE [DISTWIDTH] IN BLOOD BY AUTOMATED COUNT: 12.8 % (ref 10–15)
GLUCOSE SERPL-MCNC: 102 MG/DL (ref 70–99)
GLUCOSE UR STRIP-MCNC: NEGATIVE MG/DL
HCT VFR BLD AUTO: 31.3 % (ref 40–53)
HGB BLD-MCNC: 10.5 G/DL (ref 13.3–17.7)
HGB UR QL STRIP: NEGATIVE
HOLD SPECIMEN: NORMAL
INR PPP: 1.39 (ref 0.85–1.15)
KETONES UR STRIP-MCNC: NEGATIVE MG/DL
L PNEUMO1 AG UR QL IA: NEGATIVE
LEUKOCYTE ESTERASE UR QL STRIP: NEGATIVE
MAGNESIUM SERPL-MCNC: 1.9 MG/DL (ref 1.7–2.3)
MCH RBC QN AUTO: 32.4 PG (ref 26.5–33)
MCHC RBC AUTO-ENTMCNC: 33.5 G/DL (ref 31.5–36.5)
MCV RBC AUTO: 97 FL (ref 78–100)
MRSA DNA SPEC QL NAA+PROBE: NEGATIVE
MUCOUS THREADS #/AREA URNS LPF: PRESENT /LPF
NITRATE UR QL: NEGATIVE
PH UR STRIP: 5 [PH] (ref 5–7)
PLATELET # BLD AUTO: 368 10E3/UL (ref 150–450)
POTASSIUM SERPL-SCNC: 4 MMOL/L (ref 3.4–5.3)
RBC # BLD AUTO: 3.24 10E6/UL (ref 4.4–5.9)
RBC URINE: <1 /HPF
S PNEUM AG SPEC QL: NEGATIVE
SA TARGET DNA: NEGATIVE
SODIUM SERPL-SCNC: 138 MMOL/L (ref 135–145)
SP GR UR STRIP: 1 (ref 1–1.03)
TROPONIN T SERPL HS-MCNC: 1570 NG/L
TROPONIN T SERPL HS-MCNC: 1697 NG/L
UROBILINOGEN UR STRIP-MCNC: NORMAL MG/DL
WBC # BLD AUTO: 12.3 10E3/UL (ref 4–11)
WBC URINE: <1 /HPF

## 2023-11-07 PROCEDURE — 250N000011 HC RX IP 250 OP 636: Mod: JZ | Performed by: EMERGENCY MEDICINE

## 2023-11-07 PROCEDURE — 250N000013 HC RX MED GY IP 250 OP 250 PS 637: Performed by: HOSPITALIST

## 2023-11-07 PROCEDURE — 84484 ASSAY OF TROPONIN QUANT: CPT | Performed by: EMERGENCY MEDICINE

## 2023-11-07 PROCEDURE — G1010 CDSM STANSON: HCPCS

## 2023-11-07 PROCEDURE — 250N000011 HC RX IP 250 OP 636: Mod: JZ | Performed by: INTERNAL MEDICINE

## 2023-11-07 PROCEDURE — 84484 ASSAY OF TROPONIN QUANT: CPT | Mod: 91 | Performed by: EMERGENCY MEDICINE

## 2023-11-07 PROCEDURE — 83735 ASSAY OF MAGNESIUM: CPT | Performed by: HOSPITALIST

## 2023-11-07 PROCEDURE — 97530 THERAPEUTIC ACTIVITIES: CPT | Mod: GO

## 2023-11-07 PROCEDURE — 250N000011 HC RX IP 250 OP 636: Performed by: HOSPITALIST

## 2023-11-07 PROCEDURE — 71250 CT THORAX DX C-: CPT | Mod: MG

## 2023-11-07 PROCEDURE — 97165 OT EVAL LOW COMPLEX 30 MIN: CPT | Mod: GO

## 2023-11-07 PROCEDURE — 258N000003 HC RX IP 258 OP 636: Performed by: HOSPITALIST

## 2023-11-07 PROCEDURE — 36415 COLL VENOUS BLD VENIPUNCTURE: CPT | Performed by: INTERNAL MEDICINE

## 2023-11-07 PROCEDURE — 99207 PR APP CREDIT; MD BILLING SHARED VISIT: CPT | Mod: FS

## 2023-11-07 PROCEDURE — 85027 COMPLETE CBC AUTOMATED: CPT | Performed by: INTERNAL MEDICINE

## 2023-11-07 PROCEDURE — 85610 PROTHROMBIN TIME: CPT | Performed by: INTERNAL MEDICINE

## 2023-11-07 PROCEDURE — 97161 PT EVAL LOW COMPLEX 20 MIN: CPT | Mod: GP | Performed by: PHYSICAL THERAPIST

## 2023-11-07 PROCEDURE — 87899 AGENT NOS ASSAY W/OPTIC: CPT | Performed by: HOSPITALIST

## 2023-11-07 PROCEDURE — 250N000013 HC RX MED GY IP 250 OP 250 PS 637

## 2023-11-07 PROCEDURE — 120N000001 HC R&B MED SURG/OB

## 2023-11-07 PROCEDURE — 87641 MR-STAPH DNA AMP PROBE: CPT

## 2023-11-07 PROCEDURE — 81001 URINALYSIS AUTO W/SCOPE: CPT | Performed by: HOSPITALIST

## 2023-11-07 PROCEDURE — 99233 SBSQ HOSP IP/OBS HIGH 50: CPT | Performed by: HOSPITALIST

## 2023-11-07 PROCEDURE — 36415 COLL VENOUS BLD VENIPUNCTURE: CPT | Performed by: EMERGENCY MEDICINE

## 2023-11-07 PROCEDURE — 80048 BASIC METABOLIC PNL TOTAL CA: CPT | Performed by: INTERNAL MEDICINE

## 2023-11-07 PROCEDURE — 99207 PR NOT IN PERSON INPATIENT CONSULT STATISTICAL MARKER: CPT | Performed by: INTERNAL MEDICINE

## 2023-11-07 RX ORDER — PIPERACILLIN SODIUM, TAZOBACTAM SODIUM 4; .5 G/20ML; G/20ML
4.5 INJECTION, POWDER, LYOPHILIZED, FOR SOLUTION INTRAVENOUS EVERY 6 HOURS
Status: DISCONTINUED | OUTPATIENT
Start: 2023-11-07 | End: 2023-11-07

## 2023-11-07 RX ORDER — CETIRIZINE HYDROCHLORIDE 10 MG/1
10 TABLET ORAL DAILY
Status: DISCONTINUED | OUTPATIENT
Start: 2023-11-07 | End: 2023-11-12 | Stop reason: HOSPADM

## 2023-11-07 RX ORDER — AMPICILLIN AND SULBACTAM 2; 1 G/1; G/1
3 INJECTION, POWDER, FOR SOLUTION INTRAMUSCULAR; INTRAVENOUS EVERY 6 HOURS
Status: DISCONTINUED | OUTPATIENT
Start: 2023-11-07 | End: 2023-11-12 | Stop reason: HOSPADM

## 2023-11-07 RX ORDER — AMIODARONE HYDROCHLORIDE 200 MG/1
400 TABLET ORAL 2 TIMES DAILY
Status: COMPLETED | OUTPATIENT
Start: 2023-11-07 | End: 2023-11-07

## 2023-11-07 RX ORDER — ACETAMINOPHEN 325 MG/1
650 TABLET ORAL EVERY 6 HOURS PRN
Status: DISCONTINUED | OUTPATIENT
Start: 2023-11-07 | End: 2023-11-12 | Stop reason: HOSPADM

## 2023-11-07 RX ORDER — FUROSEMIDE 10 MG/ML
20 INJECTION INTRAMUSCULAR; INTRAVENOUS EVERY 12 HOURS
Status: DISCONTINUED | OUTPATIENT
Start: 2023-11-08 | End: 2023-11-10

## 2023-11-07 RX ORDER — CLONAZEPAM 0.5 MG/1
0.5 TABLET ORAL DAILY PRN
Status: DISCONTINUED | OUTPATIENT
Start: 2023-11-07 | End: 2023-11-07

## 2023-11-07 RX ORDER — POLYETHYLENE GLYCOL 3350 17 G/17G
17 POWDER, FOR SOLUTION ORAL DAILY
Status: DISCONTINUED | OUTPATIENT
Start: 2023-11-07 | End: 2023-11-07

## 2023-11-07 RX ORDER — AMIODARONE HYDROCHLORIDE 200 MG/1
200 TABLET ORAL DAILY
Status: DISCONTINUED | OUTPATIENT
Start: 2023-11-08 | End: 2023-11-12 | Stop reason: HOSPADM

## 2023-11-07 RX ORDER — ASCORBIC ACID 500 MG
1000 TABLET ORAL DAILY
Status: DISCONTINUED | OUTPATIENT
Start: 2023-11-07 | End: 2023-11-12 | Stop reason: HOSPADM

## 2023-11-07 RX ORDER — AMIODARONE HYDROCHLORIDE 200 MG/1
200 TABLET ORAL DAILY
Status: DISCONTINUED | OUTPATIENT
Start: 2023-11-08 | End: 2023-11-07 | Stop reason: DRUGHIGH

## 2023-11-07 RX ORDER — ACETAMINOPHEN 325 MG/1
650 TABLET ORAL EVERY 6 HOURS PRN
Status: DISCONTINUED | OUTPATIENT
Start: 2023-11-07 | End: 2023-11-07

## 2023-11-07 RX ORDER — BISACODYL 10 MG
10 SUPPOSITORY, RECTAL RECTAL DAILY PRN
Status: DISCONTINUED | OUTPATIENT
Start: 2023-11-07 | End: 2023-11-12 | Stop reason: HOSPADM

## 2023-11-07 RX ORDER — HYDROXYZINE HYDROCHLORIDE 10 MG/1
10 TABLET, FILM COATED ORAL EVERY 4 HOURS PRN
Status: DISCONTINUED | OUTPATIENT
Start: 2023-11-07 | End: 2023-11-12 | Stop reason: HOSPADM

## 2023-11-07 RX ORDER — ONDANSETRON 4 MG/1
4 TABLET, ORALLY DISINTEGRATING ORAL EVERY 6 HOURS PRN
Status: DISCONTINUED | OUTPATIENT
Start: 2023-11-07 | End: 2023-11-07

## 2023-11-07 RX ORDER — ATORVASTATIN CALCIUM 80 MG/1
80 TABLET, FILM COATED ORAL AT BEDTIME
Status: DISCONTINUED | OUTPATIENT
Start: 2023-11-07 | End: 2023-11-12 | Stop reason: HOSPADM

## 2023-11-07 RX ORDER — ONDANSETRON 2 MG/ML
4 INJECTION INTRAMUSCULAR; INTRAVENOUS EVERY 6 HOURS PRN
Status: DISCONTINUED | OUTPATIENT
Start: 2023-11-07 | End: 2023-11-07

## 2023-11-07 RX ORDER — FUROSEMIDE 10 MG/ML
40 INJECTION INTRAMUSCULAR; INTRAVENOUS EVERY 12 HOURS
Status: DISCONTINUED | OUTPATIENT
Start: 2023-11-07 | End: 2023-11-07

## 2023-11-07 RX ORDER — METOPROLOL SUCCINATE 25 MG/1
25 TABLET, EXTENDED RELEASE ORAL DAILY
Status: DISCONTINUED | OUTPATIENT
Start: 2023-11-07 | End: 2023-11-07

## 2023-11-07 RX ORDER — IPRATROPIUM BROMIDE AND ALBUTEROL SULFATE 2.5; .5 MG/3ML; MG/3ML
3 SOLUTION RESPIRATORY (INHALATION) EVERY 4 HOURS PRN
Status: DISCONTINUED | OUTPATIENT
Start: 2023-11-07 | End: 2023-11-12 | Stop reason: HOSPADM

## 2023-11-07 RX ORDER — CLONAZEPAM 0.5 MG/1
0.5 TABLET ORAL AT BEDTIME
Status: DISCONTINUED | OUTPATIENT
Start: 2023-11-07 | End: 2023-11-07

## 2023-11-07 RX ADMIN — TICAGRELOR 90 MG: 90 TABLET ORAL at 20:49

## 2023-11-07 RX ADMIN — PIPERACILLIN AND TAZOBACTAM 4.5 G: 4; .5 INJECTION, POWDER, FOR SOLUTION INTRAVENOUS at 06:45

## 2023-11-07 RX ADMIN — AMPICILLIN SODIUM AND SULBACTAM SODIUM 3 G: 2; 1 INJECTION, POWDER, FOR SOLUTION INTRAMUSCULAR; INTRAVENOUS at 18:02

## 2023-11-07 RX ADMIN — ATORVASTATIN CALCIUM 80 MG: 80 TABLET, FILM COATED ORAL at 21:22

## 2023-11-07 RX ADMIN — AMIODARONE HYDROCHLORIDE 400 MG: 200 TABLET ORAL at 22:53

## 2023-11-07 RX ADMIN — FUROSEMIDE 40 MG: 10 INJECTION, SOLUTION INTRAMUSCULAR; INTRAVENOUS at 06:10

## 2023-11-07 RX ADMIN — APIXABAN 5 MG: 5 TABLET, FILM COATED ORAL at 20:49

## 2023-11-07 RX ADMIN — VANCOMYCIN HYDROCHLORIDE 750 MG: 5 INJECTION, POWDER, LYOPHILIZED, FOR SOLUTION INTRAVENOUS at 11:45

## 2023-11-07 RX ADMIN — CETIRIZINE HYDROCHLORIDE 10 MG: 10 TABLET, FILM COATED ORAL at 14:29

## 2023-11-07 RX ADMIN — AMPICILLIN SODIUM AND SULBACTAM SODIUM 3 G: 2; 1 INJECTION, POWDER, FOR SOLUTION INTRAMUSCULAR; INTRAVENOUS at 15:00

## 2023-11-07 RX ADMIN — METOPROLOL SUCCINATE 25 MG: 25 TABLET, EXTENDED RELEASE ORAL at 14:29

## 2023-11-07 RX ADMIN — HYDROXYZINE HYDROCHLORIDE 10 MG: 10 TABLET, FILM COATED ORAL at 21:22

## 2023-11-07 RX ADMIN — AMIODARONE HYDROCHLORIDE 400 MG: 200 TABLET ORAL at 14:28

## 2023-11-07 RX ADMIN — TICAGRELOR 90 MG: 90 TABLET ORAL at 14:30

## 2023-11-07 RX ADMIN — OXYCODONE HYDROCHLORIDE AND ACETAMINOPHEN 1000 MG: 500 TABLET ORAL at 14:29

## 2023-11-07 RX ADMIN — PIPERACILLIN AND TAZOBACTAM 4.5 G: 4; .5 INJECTION, POWDER, FOR SOLUTION INTRAVENOUS at 01:02

## 2023-11-07 RX ADMIN — APIXABAN 5 MG: 5 TABLET, FILM COATED ORAL at 14:30

## 2023-11-07 ASSESSMENT — ACTIVITIES OF DAILY LIVING (ADL)
ADLS_ACUITY_SCORE: 20
ADLS_ACUITY_SCORE: 20
DEPENDENT_IADLS:: INDEPENDENT
ADLS_ACUITY_SCORE: 22
PREVIOUS_RESPONSIBILITIES: DRIVING;MEAL PREP
ADLS_ACUITY_SCORE: 22
DEPENDENT_IADLS:: INDEPENDENT
ADLS_ACUITY_SCORE: 35
ADLS_ACUITY_SCORE: 20
ADLS_ACUITY_SCORE: 22
ADLS_ACUITY_SCORE: 35

## 2023-11-07 NOTE — CONSULTS
Care Management Initial Consult    General Information  Assessment completed with: Patient,    Type of CM/SW Visit: Initial Assessment    Primary Care Provider verified and updated as needed: Yes   Readmission within the last 30 days: unable to assess      Reason for Consult: discharge planning  Advance Care Planning: Advance Care Planning Reviewed: no concerns identified        Communication Assessment  Patient's communication style: spoken language (English or Bilingual)    Hearing Difficulty or Deaf: no   Wear Glasses or Blind: yes    Cognitive  Cognitive/Neuro/Behavioral: WDL                      Living Environment:   People in home: spouse     Current living Arrangements: house      Able to return to prior arrangements: yes     Family/Social Support:  Care provided by: self  Provides care for: no one  Marital Status:   Wife          Description of Support System: Supportive, Involved    Support Assessment: Adequate social supports    Current Resources:   Patient receiving home care services: No     Community Resources: None  Equipment currently used at home: wheelchair, manual (in one of his pole [place])  Supplies currently used at home: None    Employment/Financial:  Employment Status: retired     Financial Concerns: none   Does the patient's insurance plan have a 3 day qualifying hospital stay waiver?  No    Lifestyle & Psychosocial Needs:  Social Determinants of Health     Food Insecurity: Low Risk  (10/3/2023)    Food Insecurity     Within the past 12 months, did you worry that your food would run out before you got money to buy more?: No     Within the past 12 months, did the food you bought just not last and you didn t have money to get more?: No   Depression: Not at risk (3/13/2023)    PHQ-2     PHQ-2 Score: 0   Housing Stability: Low Risk  (10/3/2023)    Housing Stability     Do you have housing? : Yes     Are you worried about losing your housing?: No   Tobacco Use: High Risk (11/7/2023)     Patient History     Smoking Tobacco Use: Every Day     Smokeless Tobacco Use: Never     Passive Exposure: Current   Financial Resource Strain: Low Risk  (10/3/2023)    Financial Resource Strain     Within the past 12 months, have you or your family members you live with been unable to get utilities (heat, electricity) when it was really needed?: No   Alcohol Use: Not on file   Transportation Needs: Low Risk  (10/3/2023)    Transportation Needs     Within the past 12 months, has lack of transportation kept you from medical appointments, getting your medicines, non-medical meetings or appointments, work, or from getting things that you need?: No   Physical Activity: Not on file   Interpersonal Safety: Low Risk  (10/9/2023)    Interpersonal Safety     Do you feel physically and emotionally safe where you currently live?: Yes     Within the past 12 months, have you been hit, slapped, kicked or otherwise physically hurt by someone?: No     Within the past 12 months, have you been humiliated or emotionally abused in other ways by your partner or ex-partner?: No   Stress: Not on file   Social Connections: Not on file     Functional Status:  Prior to admission patient needed assistance:   Dependent ADLs:: Independent  Dependent IADLs:: Independent    Mental Health Status:  Mental Health Status: No Current Concerns       Chemical Dependency Status:  Chemical Dependency Status: No Current Concerns           Values/Beliefs:  Spiritual, Cultural Beliefs, Bahai Practices, Values that affect care: no             Additional Information:    CM received referral to assist with discharge planning. Met with patient at bedside and introduced self and role. Patient lives with spouse in a multilevel level home in [place]. At baseline patient is independent with ADLs and IADLs. 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     Patient discharged to [facility]  TCU (Phone: [phone] Fax: [phone]) on 11/3/23 from a hospital stay at the  of . He discharged AMA from [facility] yesterday morning and by yesterday evening he was back in the ED with shortness of breath. CM discussed these events with patient. He reports that he left [facility] because he has not slept in 8 days. He appears to be a confused as to how long he was at [facility] for.  He also reported having a lot of anxiety while at [facility] and would prefer to not return.     Spoke with Aranza @ [facility] to confirm the above. Aranza did confirm that they likely would not accept him back into their TCU as he was moving with A and appears to need more psych support then TCU cares.     Discussed potential home care services with patient. He is unsure at this time if he is interested. CM will re approach tomorrow.     PLAN: Home with possible HC    LACI MELO RN

## 2023-11-07 NOTE — PROGRESS NOTES
"[facility]    Medicine Progress Note - Hospitalist Service    Date of Admission:  11/6/2023    Assessment & Plan      Duane C Johnson is a 73 year old male admitted on 11/6/2023. He presented to the ED with shortness of breath and is being admitted for further evaluation and treatment.     Acute respiratory failure with hypoxia        Bilateral upper lobe hospital acquired pneumonia        Possible COPD  Patient presented on 11/6 with SOB and was found to be hypoxic down to 81% in EMS. Improved to high 90s with 4L NC. CXR on 11/6 shows \"Upper lobe predominant bilateral pulmonary infiltrates. Mild interstitial prominence in the remainder of lungs. Infiltrate right upper lobe improved with worsening infiltrate left upper lobe. Normal heart size. Tiny effusions.\" He also has elevated ProBNP to 11k. No fever in ER or on floor. Low concern for sepsis.   Current O2 high 90s without supplemental oxygen.  - Start on Vancomyicn for HCAP  - Switch from Zosyn to Unasyn due to risk of renal injury with Zosyn and Vancomycin  - MRSA, strep, legionella, sputum cx pending  - Supplemental oxygen as needed  - Continue PRN duonebs  - Chest CT pending  - Consider starting methylprednisolone    HFrEF  His last LVEF on 10/30 was 20-30% in the aftermath of VT/VF arrest on 10/27. Will probably improve but he possibly might have some pulmonary vascular congestion due to this that is contributing to his hypoxia. BNP in ED 11k. Furosemide started in ED. EKG in ED shows NSR without acute changes. Taking Metoprolol, Losartan, and Atorvastatin outpatient.   - I/O, daily weights, cardiac monitoring, fluid and salt restriction  - Continue Atorvastatin, Metoprolol, Furosemide  - Hold Losartan due to BP in 100s    CAD s/p PCI         History of Ventricular Fibrillation and Cardiac Arrest  He was admitted for a STEMI from 10/26/23 to 11/3/23 and had a stent placed to his LAD on 10/27. He had a complicated course with VT/VF " arrest  on 10/27. Troponin in ED 1697 and 1570, but trending down from >10k 12 days ago and 6k 10 days ago. Taking Ticagrelor, Apixaban, Atorvastatin outpatient.  - Continue Ticagrelor, Apixaban, Atorvastatin    4. Prostate Cancer  PSA 4.61 on 3/13/23. MRI with PIRADS 4 lesion. Sarah 4+3=7. Planned for robotic prostatectomy with pelvic lymphadenopathy.     5. Anxiety  History of panic attacks. Wants to see a therapist or psychiatrist.  - Continue clonazepam    6. Hypertension  - Continue Metoprolol  - Hold Losartan due to BP in 100s and giving Furosemide    7. Tobacco Dependence  - Patient is interested in stopping smoking. Declines patch today.        Diet: Combination Diet 2 gm NA Diet; No Caffeine Diet (and additional linked orders)  Fluid restriction 1200 ML FLUID (and additional linked orders)    DVT Prophylaxis: Enoxaparin (Lovenox) SQ  Michael Catheter: Not present  Lines: Peripheral IV     Cardiac Monitoring: ACTIVE order. Indication: Acute decompensated heart failure (48 hours)  Code Status: Full Code      Clinically Significant Risk Factors Present on Admission               # Drug Induced Coagulation Defect: home medication list includes an anticoagulant medication  # Drug Induced Platelet Defect: home medication list includes an antiplatelet medication   # Hypertension: Noted on problem list  # Acute heart failure with reduced ejection fraction: last echo with EF <40% and receiving IV diuretics         # Financial/Environmental Concerns:           Disposition Plan      Expected Discharge Date: 11/09/2023                The patient's care was discussed with the Attending Physician, Dr. Drew .    Elgin Madrigal PA-C  Hospitalist Service  [facility]  Securely message with [employer] (more info)  Text page via [facility] Paging/Directory   ______________________________________________________________________    Interval History   Feels well this morning. Less shortness of breath than  "when he arrived in the ED. States he has issues sleeping in hospitals. States he wants to see a psychiatrist due to the panic attacks, and he \"can't keep living like this.\" Endorses non-productive cough, mild shortness of breath without nasal cannula, occasional chills, mild dysuria. Denies abdominal pain, chest pain, n/v, edema, confusion, fatigue, suicidal ideations.    Physical Exam   Vital Signs: Temp: 98.3  F (36.8  C) Temp src: Oral BP: 117/68 Pulse: 82   Resp: 18 SpO2: 97 % O2 Device: Nasal cannula Oxygen Delivery: 3 LPM  Weight: 146 lbs 0 oz    Constitutional: awake, alert, cooperative, no apparent distress lying in bed  Eyes: pupils equal, round and reactive to light  Respiratory: No increased work of breathing. Scattered crackles in all lung fields  Cardiovascular: regular rate and rhythm, no murmurs, rubs, or gallops  GI: normal bowel sounds, non-distended, non-tender, and no masses palpated  Skin: no redness, warmth, or swelling  Musculoskeletal: no lower extremity edema present. Full range of motion noted  Neurologic: Awake, alert, oriented.  Cranial nerves II-XII are grossly intact.  Neuropsychiatric: Memory and insight: normal, memory for past and recent events intact, and thought process normal    Medical Decision Making       60 MINUTES SPENT BY ME on the date of service doing chart review, history, exam, documentation & further activities per the note.  Tests ORDERED & REVIEWED in the past 24 hours:  Tests ORDERED in the past 24 hours:   Tests REVIEWED in the past 24 hours:  Tests personally interpreted in the past 24 hours:  - EKG tracing showing NSR with no acute changes  - CHEST XRAY showing Upper lobe predominant bilateral pulmonary infiltrates. Mild interstitial prominence in the remainder of lungs. Infiltrate right upper lobe improved with worsening infiltrate left upper lobe. Normal heart size. Tiny effusions.  - CHEST CT showing preliminary result of \"Interval development of large right and " "moderate left pleural effusions. Likely combination of volume overload and pneumonia through both lungs.\"   SUPPLEMENTAL HISTORY, in addition to the patient's history, over the past 24 hours obtained from:   - Spouse or significant other  Medical complexity over the past 24 hours:  - Prescription DRUG MANAGEMENT performed      Data     I have personally reviewed the following data over the past 24 hrs:    12.3 (H)  \   10.5 (L)   / 368     138 106 14.3 /  102 (H)   4.0 26 0.88 \     Trop: 1,570 (HH) BNP: 11,149 (H)     INR:  1.39 (H) PTT:  N/A   D-dimer:  N/A Fibrinogen:  N/A       Imaging results reviewed over the past 24 hrs:   Recent Results (from the past 24 hour(s))   Head CT w/o contrast    Narrative    EXAM: CT HEAD W/O CONTRAST  LOCATION: [facility]  DATE: 11/6/2023    INDICATION: confusion.  recent cardiac stenting on blood thinners.  COMPARISON: 11/01/2023  TECHNIQUE: Routine CT Head without IV contrast. Multiplanar reformats. Dose reduction techniques were used.    FINDINGS:  INTRACRANIAL CONTENTS: No intracranial hemorrhage, extraaxial collection, or mass effect.  No CT evidence of acute infarct. Mild presumed chronic small vessel ischemic changes. Chronic lacunar infarct left internal capsule. Mild generalized volume loss.   No hydrocephalus.     VISUALIZED ORBITS/SINUSES/MASTOIDS: No intraorbital abnormality. Chronic opacification left maxillary sinus. Moderate mucosal thickening right maxillary sinus. Scattered fluid/membrane thickening in the left mastoid air cells. No apparent mass in the   posterior nasopharynx or skull base.    BONES/SOFT TISSUES: No acute abnormality.      Impression    IMPRESSION:  1.  No acute intracranial process.  2.  Chronic maxillary sinusitis.     Chest XR,  PA & LAT    Narrative    EXAM: XR CHEST 2 VIEWS  LOCATION: [facility]  DATE: 11/6/2023    INDICATION: Recent pneumonia. Recent stemi requiring stent.  COMPARISON: " 10/30/2023.      Impression    IMPRESSION: Upper lobe predominant bilateral pulmonary infiltrates. Mild interstitial prominence in the remainder of the lungs. Infiltrate right upper lobe improved with worsening infiltrate left upper lobe. Normal heart size. Tiny effusions.

## 2023-11-07 NOTE — PROGRESS NOTES
"Occupational Therapy     11/07/23 4830   Appointment Info   Signing Clinician's Name / Credentials (OT) Fabiana Widman, OTR/L   Living Environment   People in Home spouse   Current Living Arrangements house   Home Accessibility stairs within home;stairs to enter home   Number of Stairs, Main Entrance 5   Stair Railings, Main Entrance railings safe and in good condition   Number of Stairs, Within Home, Primary greater than 10 stairs   Stair Railings, Within Home, Primary railings safe and in good condition   Self-Care   Fall history within last six months yes  (Reports some falls \"tripping in the woods\")   Activity/Exercise/Self-Care Comment Pt reports indep with ADLs, does not use walking device at baseline.   Instrumental Activities of Daily Living (IADL)   Previous Responsibilities driving;meal prep   IADL Comments Pt reports can drives but doesn't much these days. Pt's wife completes his med set-up, pt administers indep. Pt's wife completes housekeeping tasks.   General Information   Onset of Illness/Injury or Date of Surgery 11/06/23   Referring Physician Thomas Flanagan MD   Patient/Family Therapy Goal Statement (OT) To breathe better at night   Additional Occupational Profile Info/Pertinent History of Current Problem Per H&P: Duane C Johnson is a 73 year old male who presented to the ED with SOB. He was recently admitted for a STEMI and had V-fib arrests during a complicated course. He was eventually discharged and and went to a TCU for rehabilitation. He states that he left because he did not feel like he was receiving the type of care he needed and he started to have worsening SOB after leaving. He notes that it feels like he cannot breath the air out of his lungs and take another breath. He also has coughing fits with some wheezing and reports scant sputum production. He has decreased exercise tolerance as well.   Existing Precautions/Restrictions fall;oxygen therapy device and L/min   General Observations " and Info Pt currently on 1L O2 via nasal cannula, reports not on oxygen at baseline.   Cognitive Status Examination   Orientation Status orientation to person, place and time   Cognitive Status Comments Pt alert and oriented though requires reorientation to specific hospital he is in. Pt able to follow 1-2 step directions, conversation and communicate needs. Throughout OT eval, pt expressing concern about his sleep terrors as they are causing elevated anxiety and breathing issues.   Pain Assessment   Patient Currently in Pain No   Range of Motion Comprehensive   General Range of Motion no range of motion deficits identified   Strength Comprehensive (MMT)   General Manual Muscle Testing (MMT) Assessment no strength deficits identified   Bed Mobility   Bed Mobility supine-sit   Supine-Sit [place] (Bed Mobility) supervision   Transfers   Transfers sit-stand transfer   Sit-Stand Transfer   Sit-Stand [place] (Transfers) supervision   Assistive Device (Sit-Stand Transfers) walker, front-wheeled   Balance   Balance Assessment other (see comments)   Balance Comments Pt ambulates in room w/ FWW ~30 ft SBA, sturdy with no LOB, denies dizziness.   Toileting   [place] Level (Toileting) supervision   Position (Toileting) unsupported sitting   Comment, (Toileting) Pt demonstrates ability to complete toilet transfer SBA.   Clinical Impression   Criteria for Skilled Therapeutic Interventions Met (OT) Yes, treatment indicated   OT Diagnosis Decreased ADL indep/safety   Influenced by the following impairments Multifocal pneumonia, SOB   OT Problem List-Impairments impacting ADL problems related to;activity tolerance impaired;strength   Assessment of Occupational Performance 3-5 Performance Deficits   Identified Performance Deficits transfers, functional mobility, standing tolerance, cognition   Planned Therapy Interventions (OT) ADL retraining;strengthening;home program guidelines;progressive  activity/exercise;cognition   Clinical Decision Making Complexity (OT) problem focused assessment/low complexity   Risk & Benefits of therapy have been explained evaluation/treatment results reviewed;care plan/treatment goals reviewed;risks/benefits reviewed;current/potential barriers reviewed;participants voiced agreement with care plan;participants included;patient   OT Total Evaluation Time   OT Eval, Low Complexity Minutes (05724) 15   OT Goals   Therapy Frequency (OT) 3 times/week   OT Predicted Duration/Target Date for Goal Attainment 11/14/23   OT Goals Hygiene/Grooming;Toilet Transfer/Toileting;Cognition;OT Goal 1   OT: Hygiene/Grooming supervision/stand-by assist;while standing  (10 minutes)   OT: Toilet Transfer/Toileting Supervision/stand-by assist;toilet transfer;cleaning and garment management   OT: Cognitive Patient/caregiver will verbalize understanding of cognitive assessment results/recommendations as needed for safe discharge planning   OT: Goal 1 Pt will demonstrate understanding of UB HEP for maintenance/improvement of UB functional strength by discharge.   Therapeutic Activities   Therapeutic Activity Minutes (52390) 10   Symptoms noted during/after treatment fatigue;shortness of breath   Treatment Detail/Skilled Intervention Education provided on role of OT while in hospital and within discharge planning. Education provided on importance of exercise on cardiovascular health with pt verbalizing understanding. Pt provided with handout of walking and UB HEPs. Education provided on home care OT recommendation with pt and spouse in agreement. Pt remains seated EOB with call light and needs in reach, wife in room.   OT Discharge Planning   OT Plan POC Tues 1/3: standing g/h, toileting, UB HEP, monitor cognition - possible SLUMS   OT Discharge Recommendation (DC Rec) home with home care occupational therapy;home with assist   OT Rationale for DC Rec Pt presenting near baseline funcitoning though with  some decreased strength, lives with wife in house with many stairs. Recommend home care occupational therapy to ensure I/ADL indep/safety within home environment. Pt and spouse in agreement.   OT Brief overview of current status SBA supine<>sit, sit<>stand, ambulating in room ~30 ft w/ FWW   Total Session Time   Timed Code Treatment Minutes 10   Total Session Time (sum of timed and untimed services) 25

## 2023-11-07 NOTE — PROGRESS NOTES
11/07/23 1231   Appointment Info   Signing Clinician's Name / Credentials (PT) Nanette Duran, PT   Living Environment   People in Home spouse   Current Living Arrangements house   Home Accessibility stairs within home;stairs to enter home   Number of Stairs, Main Entrance 5   Stair Railings, Main Entrance railings safe and in good condition   Number of Stairs, Within Home, Primary greater than 10 stairs   Stair Railings, Within Home, Primary railings safe and in good condition   Transportation Anticipated family or friend will provide   Living Environment Comments multilevel home, walk in shower, was planning to get stair lifts eventually for him and his wife   Self-Care   Activity/Exercise/Self-Care Comment uses a manual wc when out in his pole barn doing things otherwise no AD and tries to walk daily, reports since being home was not moving much and went up the stairs and then was not able to move around or go down the stairs   General Information   Onset of Illness/Injury or Date of Surgery 11/06/23   Referring Physician Elgin Madrigal, NAZIA   Patient/Family Therapy Goals Statement (PT) to return home   Pertinent History of Current Problem (include personal factors and/or comorbidities that impact the POC) left TCU and was having increased SOB and difficulty moving around at home   Existing Precautions/Restrictions oxygen therapy device and L/min  (0.5L via NC)   Cognition   Follows Commands (Cognition) follows one-step commands   Pain Assessment   Patient Currently in Pain No   Range of Motion (ROM)   Range of Motion ROM is WFL   Strength (Manual Muscle Testing)   Strength (Manual Muscle Testing) Deficits observed during functional mobility   Bed Mobility   Comment, (Bed Mobility) indep supine to sit   Transfers   Comment, (Transfers) S sit to stand   Gait/Stairs (Locomotion)   [place] Level (Gait) supervision   Assistive Device (Gait)   (no AD)   Distance in Feet (Gait) 20'   Pattern (Gait) step-through    Clinical Impression   Criteria for Skilled Therapeutic Intervention Yes, treatment indicated   PT Diagnosis (PT) impaired functional mobility   Influenced by the following impairments fatigue, DIOP   Functional limitations due to impairments decreased activity tolerance   Clinical Presentation (PT Evaluation Complexity) stable   Clinical Presentation Rationale clinical judgement   Clinical Decision Making (Complexity) low complexity   Planned Therapy Interventions (PT) strengthening;progressive activity/exercise;stair training   Risk & Benefits of therapy have been explained evaluation/treatment results reviewed;care plan/treatment goals reviewed;risks/benefits reviewed;current/potential barriers reviewed;participants voiced agreement with care plan;patient   PT Total Evaluation Time   PT Eval, Low Complexity Minutes (59963) 8   Physical Therapy Goals   PT Frequency 6x/week   PT Predicted Duration/Target Date for Goal Attainment 11/11/23   PT Goals Stairs   PT: Transfers Independent;Sit to/from stand;Bed to/from chair   PT: Gait Independent;Modified independent;150 feet   PT: Stairs Modified independent;Greater than 10 stairs   PT Discharge Planning   PT Plan Tues 1/6- increase gait distance without AD as able monitor O2, stairs   PT Discharge Recommendation (DC Rec) home with assist;home with home care physical therapy   PT Rationale for DC Rec moving with supervision but limited eval today, will further assess but anticipate home PT would be appropriate   PT Brief overview of current status S to stand and ambulate 20'

## 2023-11-07 NOTE — ED PROVIDER NOTES
ED Provider Note  [facility]      History     Chief Complaint   Patient presents with    Shortness of Breath     Left AMA from [facility] today.      HPI  Duane C Johnson is a 73 year old male who has medical history significant for recent hospitalization from October 26 through November 3 after patient had been admitted for ST elevation myocardial infarction.  Patient also has history of hypertension, hyperlipidemia, tobacco abuse, and underwent coronary angiography and had stenting that was placed to the LAD on October 26.  Patient did have V-fib arrest x2.  Had repeat angiogram performed on October 27 that showed patent stents.  Started on amiodarone drip, ultimately found to have decreased ejection fraction, and started on Eliquis x3 months, Brilinta x1 month which was then changed to Plavix x1 year.  Patient treated with Rocephin for probable aspiration pneumonia.  There was some concerns regarding sleep apnea.  Started on Klonopin twice daily, in addition to melatonin for sleep.  Had been discharged to his transitional care unit.    Patient reportedly left his transitional care unit from [facility] earlier today.  He had been on oxygen therapy, however uncertain exact amount of oxygen.  Later, wife clarifies that he was on 1 L nasal cannula oxygen, however he had been told reportedly that this was not necessary.    Patient presents to the emergency department this evening after his wife called ambulance after patient was complaining of increased amounts of shortness of breath.  He was given duo nebulizer after  EMS reported low oxygen saturations.  With duo nebulizer, lung sounds had reportedly improved, and oxygen saturation had improved as well.    Patient denies any chest pain.  However, difficult historian, with some confusion noted      Independent Historian:    Wife states that patient had severe shortness of breath, and was actually saying his goodbyes to his wife feeling as if he  would not make it this afternoon.    Review of External Notes:  I did review transitional care unit note.  I also reviewed recent hospitalization discharge summary.  Notably, did have Klonopin amount decreased.  Also had borderline low blood pressures typically around  systolic.      Allergies:  No Known Allergies    Problem List:    Patient Active Problem List    Diagnosis Date Noted    Acute respiratory failure with hypoxia (H) 11/07/2023     Priority: Medium    Multifocal pneumonia 11/07/2023     Priority: Medium    Elevated brain natriuretic peptide (BNP) level 11/07/2023     Priority: Medium    Primary hypertension 11/07/2023     Priority: Medium    SOB (shortness of breath) 11/07/2023     Priority: Medium    Coronary artery disease due to calcified coronary lesion 11/07/2023     Priority: Medium    Pneumonia of left upper lobe due to infectious organism 11/07/2023     Priority: Medium    Congestive heart failure, unspecified HF chronicity, unspecified heart failure type (H) 11/07/2023     Priority: Medium    ST elevation MI (STEMI) (H) 10/26/2023     Priority: Medium    ST elevation myocardial infarction (STEMI), unspecified artery (H) 10/26/2023     Priority: Medium    Prostate cancer (H) 09/23/2023     Priority: Medium    Hyperlipidemia LDL goal <130 01/26/2016     Priority: Medium    Benign essential hypertension 01/26/2016     Priority: Medium    Adiposity 05/06/2010     Priority: Medium    Tobacco dependence syndrome 01/23/2008     Priority: Medium    Hypertension 01/23/2008     Priority: Medium        Past Medical History:    Past Medical History:   Diagnosis Date    Hypertension        Past Surgical History:    Past Surgical History:   Procedure Laterality Date    COLONOSCOPY N/A 3/23/2023    Procedure: COLONOSCOPY, FLEXIBLE, WITH LESION REMOVAL USING SNARE;  Surgeon: Jose Faustin MD;  Location: [facility]    CV CENTRAL VENOUS CATHETER PLACEMENT N/A 10/26/2023    Procedure: Central Venous  Catheter Placement;  Surgeon: Rob Lyles MD;  Location:  HEART CARDIAC CATH LAB    CV CORONARY ANGIOGRAM N/A 10/27/2023    Procedure: Coronary Angiogram;  Surgeon: Rob Lyles MD;  Location: [facility] CARDIAC CATH LAB    CV CORONARY ANGIOGRAM N/A 10/26/2023    Procedure: Coronary Angiogram;  Surgeon: Rob Lyles MD;  Location: [facility] CARDIAC CATH LAB    CV LEFT HEART CATH N/A 10/26/2023    Procedure: Left Heart Catheterization;  Surgeon: Rob Lyles MD;  Location: [facility] CARDIAC CATH LAB    CV PCI N/A 10/27/2023    Procedure: Percutaneous Coronary Intervention;  Surgeon: Rob Lyles MD;  Location: [facility] CARDIAC CATH LAB    CV PCI STENT DRUG ELUTING N/A 10/26/2023    Procedure: Percutaneous Coronary Intervention Stent;  Surgeon: Rob Lyles MD;  Location: [facility] CARDIAC CATH LAB       Family History:    Family History   Problem Relation Age of Onset    Other Cancer Mother     Obesity Mother     Cervical Cancer Sister     GI problems Father     Other Cancer Sister        Social History:  Marital Status:   [2]  Social History     Tobacco Use    Smoking status: Every Day     Packs/day: .25     Types: Cigarettes     Passive exposure: Current    Smokeless tobacco: Never   Vaping Use    Vaping Use: Never used   Substance Use Topics    Alcohol use: Yes     Comment: occas    Drug use: No        Medications:    acetaminophen (TYLENOL) 325 MG tablet  [START ON 11/8/2023] amiodarone (PACERONE) 200 MG tablet  amiodarone (PACERONE) 400 MG tablet  apixaban ANTICOAGULANT (ELIQUIS) 5 MG tablet  atorvastatin (LIPITOR) 80 MG tablet  cetirizine (ZYRTEC) 10 MG tablet  clonazePAM (KLONOPIN) 0.5 MG tablet  fish oil-omega-3 fatty acids 1000 MG capsule  losartan (COZAAR) 25 MG tablet  melatonin 10 MG TABS tablet  metoprolol succinate ER (TOPROL XL) 25 MG 24 hr tablet  multivitamin w/minerals (THERA-VIT-M) tablet  ticagrelor (BRILINTA) 90 MG tablet  vitamin C  "(ASCORBIC ACID) 1000 MG TABS          Review of Systems  A medically appropriate review of systems was performed with pertinent positives and negatives noted in the HPI, and all other systems negative.    Physical Exam   Patient Vitals for the past 24 hrs:   BP Temp Temp src Pulse Resp SpO2 Height Weight   11/07/23 0300 109/74 -- -- 74 21 97 % -- --   11/07/23 0245 109/64 -- -- 71 16 97 % -- --   11/07/23 0237 106/74 -- -- 75 24 97 % -- --   11/07/23 0204 106/69 -- -- 71 21 97 % -- --   11/07/23 0149 101/60 -- -- 70 25 97 % -- --   11/07/23 0125 91/63 -- -- 65 18 97 % -- --   11/07/23 0110 101/59 -- -- 75 23 95 % -- --   11/07/23 0104 97/62 -- -- 71 26 96 % -- --   11/07/23 0049 96/65 -- -- 71 22 96 % -- --   11/07/23 0034 106/60 -- -- 72 26 95 % -- --   11/07/23 0019 93/59 -- -- 70 26 95 % -- --   11/07/23 0015 99/65 -- -- 77 18 95 % -- --   11/06/23 2249 120/76 98.3  F (36.8  C) Oral 85 22 96 % 1.702 m (5' 7\") 66.2 kg (146 lb)          Physical Exam  General: alert and in no acute distress on arrival  Head: atraumatic, normocephalic  Lungs:  nonlabored  CV:  extremities warm and perfused  Abd: nondistended  Skin: no rashes, no diaphoresis and skin color normal  Neuro: Patient awake, alert, confused.  Oriented to person.  Psychiatric: affect/mood normal,        ED Course                 Procedures         EKG, reviewed by myself shows normal sinus rhythm.  Rate 78 bpm.  Nonspecific FZ-B-ymwslex changes.  No acute ischemic appearing changes.                 Results for orders placed or performed during the hospital encounter of 11/06/23 (from the past 24 hour(s))   [place] Draw    Narrative    The following orders were created for panel order [place] Draw.  Procedure                               Abnormality         Status                     ---------                               -----------         ------                     Extra Blue Top Tube[[id number]]                              Final result             "   Extra Red Top Tube[[id number]]                               Final result               Extra Green Top (Lithium...[[id number]]                      Final result               Extra Purple Top Tube[[id number]]                            Final result                 Please view results for these tests on the individual orders.   Blood gas venous   Result Value Ref Range    pH Venous 7.36 7.32 - 7.43    pCO2 Venous 47 40 - 50 mm Hg    pO2 Venous 19 (L) 25 - 47 mm Hg    Bicarbonate Venous 26 21 - 28 mmol/L    Base Excess/Deficit 0.6 -7.7 - 1.9 mmol/L    FIO2 36    Extra Blue Top Tube   Result Value Ref Range    Hold Specimen JIC    Extra Red Top Tube   Result Value Ref Range    Hold Specimen JIC    Extra Green Top (Lithium Heparin) Tube   Result Value Ref Range    Hold Specimen JIC    Extra Purple Top Tube   Result Value Ref Range    Hold Specimen JIC    CBC with platelets differential    Narrative    The following orders were created for panel order CBC with platelets differential.  Procedure                               Abnormality         Status                     ---------                               -----------         ------                     CBC with platelets and d...[[id number]]  Abnormal            Final result                 Please view results for these tests on the individual orders.   Basic metabolic panel   Result Value Ref Range    Sodium 134 (L) 135 - 145 mmol/L    Potassium 4.6 3.4 - 5.3 mmol/L    Chloride 102 98 - 107 mmol/L    Carbon Dioxide (CO2) 21 (L) 22 - 29 mmol/L    Anion Gap 11 7 - 15 mmol/L    Urea Nitrogen 16.6 8.0 - 23.0 mg/dL    Creatinine 0.90 0.67 - 1.17 mg/dL    GFR Estimate 90 >60 mL/min/1.73m2    Calcium 8.3 (L) 8.8 - 10.2 mg/dL    Glucose 122 (H) 70 - 99 mg/dL   Nt probnp inpatient (BNP)   Result Value Ref Range    N terminal Pro BNP Inpatient 11,149 (H) 0 - 900 pg/mL   CBC with platelets and differential   Result Value Ref Range    WBC Count 13.6 (H) 4.0 - 11.0 10e3/uL    RBC  Count 3.32 (L) 4.40 - 5.90 10e6/uL    Hemoglobin 10.8 (L) 13.3 - 17.7 g/dL    Hematocrit 32.2 (L) 40.0 - 53.0 %    MCV 97 78 - 100 fL    MCH 32.5 26.5 - 33.0 pg    MCHC 33.5 31.5 - 36.5 g/dL    RDW 12.7 10.0 - 15.0 %    Platelet Count 406 150 - 450 10e3/uL    % Neutrophils 86 %    % Lymphocytes 8 %    % Monocytes 5 %    % Eosinophils 1 %    % Basophils 0 %    % Immature Granulocytes 0 %    NRBCs per 100 WBC 0 <1 /100    Absolute Neutrophils 11.6 (H) 1.6 - 8.3 10e3/uL    Absolute Lymphocytes 1.1 0.8 - 5.3 10e3/uL    Absolute Monocytes 0.7 0.0 - 1.3 10e3/uL    Absolute Eosinophils 0.2 0.0 - 0.7 10e3/uL    Absolute Basophils 0.1 0.0 - 0.2 10e3/uL    Absolute Immature Granulocytes 0.1 <=0.4 10e3/uL    Absolute NRBCs 0.0 10e3/uL   Head CT w/o contrast    Narrative    EXAM: CT HEAD W/O CONTRAST  LOCATION: [facility]  DATE: 11/6/2023    INDICATION: confusion.  recent cardiac stenting on blood thinners.  COMPARISON: 11/01/2023  TECHNIQUE: Routine CT Head without IV contrast. Multiplanar reformats. Dose reduction techniques were used.    FINDINGS:  INTRACRANIAL CONTENTS: No intracranial hemorrhage, extraaxial collection, or mass effect.  No CT evidence of acute infarct. Mild presumed chronic small vessel ischemic changes. Chronic lacunar infarct left internal capsule. Mild generalized volume loss.   No hydrocephalus.     VISUALIZED ORBITS/SINUSES/MASTOIDS: No intraorbital abnormality. Chronic opacification left maxillary sinus. Moderate mucosal thickening right maxillary sinus. Scattered fluid/membrane thickening in the left mastoid air cells. No apparent mass in the   posterior nasopharynx or skull base.    BONES/SOFT TISSUES: No acute abnormality.      Impression    IMPRESSION:  1.  No acute intracranial process.  2.  Chronic maxillary sinusitis.     Chest XR,  PA & LAT    Narrative    EXAM: XR CHEST 2 VIEWS  LOCATION: [facility]  DATE: 11/6/2023    INDICATION: Recent  pneumonia. Recent stemi requiring stent.  COMPARISON: 10/30/2023.      Impression    IMPRESSION: Upper lobe predominant bilateral pulmonary infiltrates. Mild interstitial prominence in the remainder of the lungs. Infiltrate right upper lobe improved with worsening infiltrate left upper lobe. Normal heart size. Tiny effusions.   Troponin T, High Sensitivity   Result Value Ref Range    Troponin T, High Sensitivity 1,697 (HH) <=22 ng/L   Troponin T, High Sensitivity   Result Value Ref Range    Troponin T, High Sensitivity 1,570 (HH) <=22 ng/L       MEDICATIONS GIVEN IN THE EMERGENCY DEPARTMENT:  Medications   piperacillin-tazobactam (ZOSYN) 4.5 g vial to attach to  mL bag (4.5 g Intravenous $New Bag 11/7/23 0102)           Independent Interpretation (X-rays, CTs, rhythm strip):  CT head without acute findings.  Images personally reviewed  Chest x-ray with slight bilateral upper infiltrates present based on my personal review    Consultations/Discussion of Management or Tests:  None       Social Determinants of Health affecting care:         Assessments & Plan (with Medical Decision Making)  73 year old male who presents to the Emergency Department for evaluation of increased amounts of shortness of breath.  Upon EMS arrival, patient had oxygen saturations of 81% on room air.  This improved with duo nebulizer, in addition to oxygen therapy.  Arrived on 4 L nasal cannula oxygen.  Did have recent cardiac arrest x2 at the end of October, October 26, and October 27.  Patient did require repeat angiogram that showed patent stents on October 27.  Did have subsequent decreased ejection fraction as a result of the anterior myocardial infarction.  Patient also does have underlying tobacco use, with likely COPD, and sleep apnea.  Has anxiety as well.  Wife reports patient has had increased amounts of confusion, especially at night.  Patient has been on dual antiplatelet therapy secondary to his recent myocardial infarction,  and therefore CT scan of the head is performed to rule out other potential causes such as head bleed.  CT scan unremarkable.  Chest x-ray does show slight worsening of left upper lobe infiltrate.  Based on elevated white blood cell count, with increased oxygen requirements, I will treat as hospital-acquired pneumonia with Zosyn.  Patient has been on approximately 2 to 3 L nasal cannula oxygen during ED course in order to maintain oxygen saturations.  He does have some waxing and waning confusion.  This was also noted during his last TCU visit note.  Likely medication side effect.    Patient remains hemodynamically stable on supplemental nasal cannula oxygen, and therefore requires hospitalization.  I discussed with hospitalist, Dr. Flanagan, who accepted patient for hospitalization.       I have reviewed the nursing notes.    I have reviewed the findings, diagnosis, plan and need for follow up with the patient.       Critical Care time:  none      NEW PRESCRIPTIONS STARTED AT TODAY'S ER VISIT  New Prescriptions    No medications on file       Final diagnoses:   Pneumonia of left upper lobe due to infectious organism   SOB (shortness of breath)   Acute respiratory failure with hypoxia (H)   Tobacco dependence syndrome   Benign essential hypertension   Primary hypertension   Coronary artery disease due to calcified coronary lesion   Congestive heart failure, unspecified HF chronicity, unspecified heart failure type (H)       11/6/2023   [facility] EMERGENCY DEPT       Guero Vivar MD  11/07/23 [id number]

## 2023-11-07 NOTE — MEDICATION SCRIBE - ADMISSION MEDICATION HISTORY
Medication Scribe Admission Medication History    Admission medication history is complete. The information provided in this note is only as accurate as the sources available at the time of the update.    Information Source(s): [facility] ([facility] medication list/MAR and    via  by phone with spouse.    Pertinent Information: Wife states that patient reacted to Clonazepam where he acted like a zombie and he was shaky and could barely talk. He does not want to take that med again.  Amiodarone 200 mg was not on MAR.  It was increased to 400 mg for 4 days.  He did not have any medicines after he left [facility] yesterday.    Changes made to PTA medication list:  Added: None  Deleted: None  Changed: Atorvastatin from daily to bedtime    Medication Affordability:  Not including over the counter (OTC) medications, was there a time in the past 3 months when you did not take your medications as prescribed because of cost?: Unable to Assess (nursing home patient)    Allergies reviewed with patient [facility] MAR and updates made in EHR: yes, added Clonazepam.    Medication History Completed By: Brisa Pyle 11/7/2023 12:19 PM    PTA Med List   Medication Sig Last Dose    acetaminophen (TYLENOL) 325 MG tablet Take 2 tablets (650 mg) by mouth every 6 hours as needed 11/5/2023 at 0230    amiodarone (PACERONE) 400 MG tablet Take 1 tablet (400 mg) by mouth 2 times daily for 4 days 11/6/2023 at am    apixaban ANTICOAGULANT (ELIQUIS) 5 MG tablet Take 1 tablet (5 mg) by mouth 2 times daily 11/6/2023 at am    atorvastatin (LIPITOR) 80 MG tablet Take 1 tablet (80 mg) by mouth daily 11/5/2023 at hs    cetirizine (ZYRTEC) 10 MG tablet Take 10 mg by mouth daily 11/6/2023 at am    clonazePAM (KLONOPIN) 0.5 MG tablet Take 1 tablet (0.5 mg) by mouth at bedtime. May also take 1 tablet (0.5 mg) daily as needed for anxiety. 11/6/2023 at am, patient does not want to take this anymore    fish oil-omega-3 fatty acids 1000 MG capsule Take  1 g by mouth 2 times daily Also contains another supplement 11/6/2023 at am    losartan (COZAAR) 25 MG tablet Take 0.5 tablets (12.5 mg) by mouth every evening 11/5/2023 at pm    melatonin 10 MG TABS tablet Take 1 tablet (10 mg) by mouth at bedtime 11/5/2023 at hs    metoprolol succinate ER (TOPROL XL) 25 MG 24 hr tablet Take 1 tablet (25 mg) by mouth daily 11/6/2023 at am    multivitamin w/minerals (THERA-VIT-M) tablet Take 1 tablet by mouth daily 11/6/2023 at am    ticagrelor (BRILINTA) 90 MG tablet Take 1 tablet (90 mg) by mouth every 12 hours 11/6/2023 at 0800    vitamin C (ASCORBIC ACID) 1000 MG TABS Take 1,000 mg by mouth daily 11/6/2023 at am

## 2023-11-07 NOTE — ED NOTES
"[facility]   Admission Handoff    The patient is Duane C Johnson, 73 year old who arrived in the ED by AMBULANCE from home with a complaint of Shortness of Breath (Left AMA from jasso today. )  . The patient's current symptoms are a recurrence of a past episode and during this time the symptoms have decreased. In the ED, patient was diagnosed with   Final diagnoses:   None         Needed?: No    Allergies:  No Known Allergies    Past Medical Hx:   Past Medical History:   Diagnosis Date    Hypertension        Initial vitals were: BP: 120/76  Pulse: 85  Temp: 98.3  F (36.8  C)  Resp: 22  Height: 170.2 cm (5' 7\")  Weight: 66.2 kg (146 lb)  SpO2: 96 %   Recent vital Signs: BP 97/62   Pulse 71   Temp 98.3  F (36.8  C) (Oral)   Resp 26   Ht 1.702 m (5' 7\")   Wt 66.2 kg (146 lb)   SpO2 96%   BMI 22.87 kg/m      Elimination Status: Continent: Yes     Activity Level: 1 assist and walker    Fall Status: Reason for falls risk:  Mobility  bed/chair alarm on, activity supervised, and room door open    Baseline Mental status: WDL- possible STML   Current Mental Status changes: at basesline    Infection present or suspected this encounter: yes respiratory  Sepsis suspected: No    Isolation type: droplet    Bariatric equipment needed?: No    In the ED these meds were given:   Medications   piperacillin-tazobactam (ZOSYN) 4.5 g vial to attach to  mL bag (4.5 g Intravenous $New Bag 11/7/23 0102)       Drips running?  No    Home pump  No    Current LDAs: Peripheral IV: Site Left AC; Gauge 20g  none     Results:   Labs/Imaging  Ordered and Resulted from Time of ED Arrival Up to the Time of Departure from the ED  Results for orders placed or performed during the hospital encounter of 11/06/23 (from the past 24 hour(s))   Gilman Draw    Narrative    The following orders were created for panel order [place]man Draw.  Procedure                               Abnormality         Status      "                ---------                               -----------         ------                     Extra Blue Top Tube[[id number]]                              Final result               Extra Red Top Tube[[id number]]                               Final result               Extra Green Top (Lithium...[[id number]]                      Final result               Extra Purple Top Tube[[id number]]                            Final result                 Please view results for these tests on the individual orders.   Blood gas venous   Result Value Ref Range    pH Venous 7.36 7.32 - 7.43    pCO2 Venous 47 40 - 50 mm Hg    pO2 Venous 19 (L) 25 - 47 mm Hg    Bicarbonate Venous 26 21 - 28 mmol/L    Base Excess/Deficit 0.6 -7.7 - 1.9 mmol/L    FIO2 36    Extra Blue Top Tube   Result Value Ref Range    Hold Specimen JIC    Extra Red Top Tube   Result Value Ref Range    Hold Specimen JIC    Extra Green Top (Lithium Heparin) Tube   Result Value Ref Range    Hold Specimen JIC    Extra Purple Top Tube   Result Value Ref Range    Hold Specimen JIC    CBC with platelets differential    Narrative    The following orders were created for panel order CBC with platelets differential.  Procedure                               Abnormality         Status                     ---------                               -----------         ------                     CBC with platelets and d...[[id number]]  Abnormal            Final result                 Please view results for these tests on the individual orders.   Basic metabolic panel   Result Value Ref Range    Sodium 134 (L) 135 - 145 mmol/L    Potassium 4.6 3.4 - 5.3 mmol/L    Chloride 102 98 - 107 mmol/L    Carbon Dioxide (CO2) 21 (L) 22 - 29 mmol/L    Anion Gap 11 7 - 15 mmol/L    Urea Nitrogen 16.6 8.0 - 23.0 mg/dL    Creatinine 0.90 0.67 - 1.17 mg/dL    GFR Estimate 90 >60 mL/min/1.73m2    Calcium 8.3 (L) 8.8 - 10.2 mg/dL    Glucose 122 (H) 70 - 99 mg/dL   Nt probnp inpatient (BNP)    Result Value Ref Range    N terminal Pro BNP Inpatient 11,149 (H) 0 - 900 pg/mL   CBC with platelets and differential   Result Value Ref Range    WBC Count 13.6 (H) 4.0 - 11.0 10e3/uL    RBC Count 3.32 (L) 4.40 - 5.90 10e6/uL    Hemoglobin 10.8 (L) 13.3 - 17.7 g/dL    Hematocrit 32.2 (L) 40.0 - 53.0 %    MCV 97 78 - 100 fL    MCH 32.5 26.5 - 33.0 pg    MCHC 33.5 31.5 - 36.5 g/dL    RDW 12.7 10.0 - 15.0 %    Platelet Count 406 150 - 450 10e3/uL    % Neutrophils 86 %    % Lymphocytes 8 %    % Monocytes 5 %    % Eosinophils 1 %    % Basophils 0 %    % Immature Granulocytes 0 %    NRBCs per 100 WBC 0 <1 /100    Absolute Neutrophils 11.6 (H) 1.6 - 8.3 10e3/uL    Absolute Lymphocytes 1.1 0.8 - 5.3 10e3/uL    Absolute Monocytes 0.7 0.0 - 1.3 10e3/uL    Absolute Eosinophils 0.2 0.0 - 0.7 10e3/uL    Absolute Basophils 0.1 0.0 - 0.2 10e3/uL    Absolute Immature Granulocytes 0.1 <=0.4 10e3/uL    Absolute NRBCs 0.0 10e3/uL   Head CT w/o contrast    Narrative    EXAM: CT HEAD W/O CONTRAST  LOCATION: [facility]  DATE: 11/6/2023    INDICATION: confusion.  recent cardiac stenting on blood thinners.  COMPARISON: 11/01/2023  TECHNIQUE: Routine CT Head without IV contrast. Multiplanar reformats. Dose reduction techniques were used.    FINDINGS:  INTRACRANIAL CONTENTS: No intracranial hemorrhage, extraaxial collection, or mass effect.  No CT evidence of acute infarct. Mild presumed chronic small vessel ischemic changes. Chronic lacunar infarct left internal capsule. Mild generalized volume loss.   No hydrocephalus.     VISUALIZED ORBITS/SINUSES/MASTOIDS: No intraorbital abnormality. Chronic opacification left maxillary sinus. Moderate mucosal thickening right maxillary sinus. Scattered fluid/membrane thickening in the left mastoid air cells. No apparent mass in the   posterior nasopharynx or skull base.    BONES/SOFT TISSUES: No acute abnormality.      Impression    IMPRESSION:  1.  No acute intracranial  process.  2.  Chronic maxillary sinusitis.     Chest XR,  PA & LAT    Narrative    EXAM: XR CHEST 2 VIEWS  LOCATION: [facility]  DATE: 11/6/2023    INDICATION: Recent pneumonia. Recent stemi requiring stent.  COMPARISON: 10/30/2023.      Impression    IMPRESSION: Upper lobe predominant bilateral pulmonary infiltrates. Mild interstitial prominence in the remainder of the lungs. Infiltrate right upper lobe improved with worsening infiltrate left upper lobe. Normal heart size. Tiny effusions.   Troponin T, High Sensitivity   Result Value Ref Range    Troponin T, High Sensitivity 1,697 () <=22 ng/L       For the majority of the shift this patient's behavior was Green     Cardiac Rhythm: Normal Sinus  Pt needs tele? Yes  Skin/wound Issues: None    Code Status: Full Code    Pain control: good    Nausea control: good    Abnormal labs/tests/findings requiring intervention: Trop elevated - repeat at 0230    Patient tested for COVID 19 prior to admission: NO     OBS brochure/video discussed/provided to patient/family: No     Family present during ED course? Yes     Family Comments/Social Situation comments: spouse at bedside    Tasks needing completion: None    Deana Moreno RN

## 2023-11-07 NOTE — CONSULTS
Pharmacy Vancomycin Initial Note  Date of Service 2023  Patient's  1950  73 year old, male    Indication: Healthcare-Associated Pneumonia    Current estimated CrCl = Estimated Creatinine Clearance: 70 mL/min (based on SCr of 0.88 mg/dL).    Creatinine for last 3 days  2023: 10:49 PM Creatinine 0.90 mg/dL  2023:  6:06 AM Creatinine 0.88 mg/dL    Recent Vancomycin Level(s) for last 3 days  No results found for requested labs within last 3 days.      Vancomycin IV Administrations (past 72 hours)        No vancomycin orders with administrations in past 72 hours.                    Nephrotoxins and other renal medications (From now, onward)      Start     Dose/Rate Route Frequency Ordered Stop    23 1000  vancomycin (VANCOCIN) 750 mg in 0.9% NaCl 250 mL intermittent infusion         750 mg  over 60 Minutes Intravenous EVERY 12 HOURS 23 0929      23 0930  ampicillin-sulbactam (UNASYN) 3 g vial to attach to  mL bag         3 g  over 15-30 Minutes Intravenous EVERY 6 HOURS 23 0920      23 0500  furosemide (LASIX) injection 40 mg         40 mg  over 1-3 Minutes Intravenous EVERY 12 HOURS 23 0430              Contrast Orders - past 72 hours (72h ago, onward)      None            Insight[employer] Prediction of Planned Initial Vancomycin Regimen  Loading dose: N/A  Regimen: 750 mg IV every 12 hours.  Start time: 10:08 on 2023  Exposure target: AUC24 (range)400-600 mg/L.hr   AUC24,ss: 487 mg/L.hr  Probability of AUC24 > 400: 72 %  Ctrough,ss: 15.9 mg/L  Probability of Ctrough,ss > 20: 28 %  Probability of nephrotoxicity (Lodise KARLA ): 11 %        Plan:  Start vancomycin  750 mg IV q12h.   Vancomycin monitoring method: AUC  Vancomycin therapeutic monitoring goal: 400-600 mg*h/L  Pharmacy will check vancomycin levels as appropriate in 1-3 Days.    Serum creatinine levels will be ordered daily for the first week of therapy and at least twice weekly for  subsequent weeks.      Chelsey Aguila, PharmD

## 2023-11-07 NOTE — ED TRIAGE NOTES
Patient was at [facility] for transitional care, left AMA today and became very SOA, spouse called 911.  On arrival EMS reports sats 81%  given duoneb and placed on 4L02 - sats 98%.  Patient had recent bout of pneumonia (abx completed), also reports having recent stent placement post cardiac arrest. (States the stents that were placed are experimental).  Patient has been on oxygen at [facility], is unsure how much.  Is on Apixiban since stent placement.   Triage Assessment (Adult)       Row Name 11/06/23 0649          Triage Assessment    Airway WDL X     Additional Documentation Breath Sounds (Group);Heart Sounds (Row)        Respiratory WDL    Respiratory WDL rhythm/pattern     Rhythm/Pattern, Respiratory shallow;shortness of breath;tachypneic        Breath Sounds    Breath Sounds All Fields     All Lung Fields Breath Sounds Anterior:;diminished;clear        Skin Circulation/Temperature WDL    Skin Circulation/Temperature WDL WDL        Cardiac WDL    Cardiac WDL WDL        Peripheral/Neurovascular WDL    Peripheral Neurovascular WDL WDL        Cognitive/Neuro/Behavioral WDL    Cognitive/Neuro/Behavioral WDL WDL

## 2023-11-07 NOTE — H&P
[facility]    History and Physical - Hospitalist Service       Date of Admission:  11/6/2023    Assessment & Plan      Duane C Johnson is a 73 year old male admitted on 11/6/2023. He presented to the ED with shortness of breath.     Acute respiratory failure with hypoxia        Bilateral upper lobe pneumonia        Possible COPD  The patient presented with SOB and was found to be hypoxic and imaging shows bilateral upper lobe infiltrates. He also has elevated ProBNP. His symptoms sound like air trapping from COPD/emphysema and he is a long-time smoker.   - admit to hospitalist service  - start on cefepime and vancomyicn for HCAP  - blood cultures and sputum culture  - supplemental oxygen  - c/w duonebs  - consider starting methylprednisolone    HFrEF  His last EF was 20-30% in the aftermath of V-fib arrest. Will probably improve but he possibly might have some pulmonary vascular congestion due to this that is contributing to his hypoxia.   - I/O, daily weights  - c/w atorvastatin, metoprolol  - consider starting furosemide    CAD s/p PCI   He had a STEMI on 10/26/2023 and had a stent placed to his LAD on the same day. He had a complicated course with V-fib arrest.   - c/w aspirin and ticagrelor  - c/w apixaban  - c/w atorvastatin  - patient is interested in stopping smoking    History of Ventricular Fibrillation   - c/w amiodarone and metoprolol    Anxiety  - c/w clonazepam    - c/w amiodarone and metoprolol       Diet: Combination Diet 2 gm NA Diet; No Caffeine Diet (and additional linked orders)  Fluid restriction 1800 ML FLUID (and additional linked orders)    DVT Prophylaxis: DOAC  Michael Catheter: Not present  Lines: None     Code Status: Full Code      Clinically Significant Risk Factors Present on Admission               # Drug Induced Coagulation Defect: home medication list includes an anticoagulant medication  # Drug Induced Platelet Defect: home medication list includes an  antiplatelet medication   # Hypertension: Noted on problem list  # Chronic heart failure with reduced ejection fraction: last echo with EF <40%         # Financial/Environmental Concerns:           Disposition Plan      Expected Discharge Date: 11/09/2023                The patient's care was discussed with the Bedside Nurse, Patient, and Patient's Family.        Thmoas Flanagan MD  [facility]  Securely message with the Vocera Web Console (learn more here)  Text page via trivago Paging/Directory      Visit/Communication Style   Virtual (Video) communication was used to evaluate Duane.  Duane consented to the use of video communication: yes  Video START time: 0605, 11/7/2023  Video STOP time: 0635, 11/7/2023   Patient's location: [facility]   Provider's location during the visit: [facility] Tele-medicine site        ______________________________________________________________________    Chief Complaint   Shortness of breath    History is obtained from the patient    History of Present Illness   Duane C Johnson is a 73 year old male who presented to the ED with SOB. He was recently admitted for a STEMI and had V-fib arrests during a complicated course. He was eventually discharged and and went to a TCU for rehabilitation. He states that he left because he did not feel like he was receiving the type of care he needed and he started to have worsening SOB after leaving. He notes that it feels like he cannot breath the air out of his lungs and take another breath. He also has coughing fits with some wheezing and reports scant sputum production. He has decreased exercise tolerance as well. He denies any chest pain, pleuritic pain, fevers, chills, N/V/D/C, abdominal pain, LE edema. He does have orthopnea, however. He has been compliant with his medications. He is very much interested in knowing what he should do to prevent a future heart attack or cardiac arrest. He  smokes roughly 1ppd and is interested in stopping.      Review of Systems    General: negative for fever, chills, sweats, weakness  Eyes: negative for blurred vision, loss of vision  Ear Nose and Throat: negative for pharyngitis, speech or swallowing difficulties  Respiratory:  Positive for cough, wheezing, SOB and DIOP. Negative for pleuritic pain.   Cardiology:  negative for chest pain, palpitations, PND, edema, syncope . Positive for orthopnea  Gastrointestinal: negative for abdominal pain, nausea, vomiting, diarrhea, constipation, hematemesis, melena or hematochezia  Genitourinary: negative for frequency, urgency, dysuria, hematuria   Neurological: negative for focal weakness, paresthesia    Past Medical History    I have reviewed this patient's medical history and updated it with pertinent information if needed.   Past Medical History:   Diagnosis Date    Hypertension        Past Surgical History   I have reviewed this patient's surgical history and updated it with pertinent information if needed.  Past Surgical History:   Procedure Laterality Date    COLONOSCOPY N/A 3/23/2023    Procedure: COLONOSCOPY, FLEXIBLE, WITH LESION REMOVAL USING SNARE;  Surgeon: Jose Faustin MD;  Location: [facility]    CV CENTRAL VENOUS CATHETER PLACEMENT N/A 10/26/2023    Procedure: Central Venous Catheter Placement;  Surgeon: Rob Lyles MD;  Location: [facility] CARDIAC CATH LAB    CV CORONARY ANGIOGRAM N/A 10/27/2023    Procedure: Coronary Angiogram;  Surgeon: Rob Lyles MD;  Location: [facility] CARDIAC CATH LAB    CV CORONARY ANGIOGRAM N/A 10/26/2023    Procedure: Coronary Angiogram;  Surgeon: Rob Lyles MD;  Location: [facility] CARDIAC CATH LAB    CV LEFT HEART CATH N/A 10/26/2023    Procedure: Left Heart Catheterization;  Surgeon: Rob Lyles MD;  Location: [facility] CARDIAC CATH LAB    CV PCI N/A 10/27/2023    Procedure: Percutaneous Coronary Intervention;  Surgeon: Trupti  MD Rob;  Location: [facility] CARDIAC CATH LAB    CV PCI STENT DRUG ELUTING N/A 10/26/2023    Procedure: Percutaneous Coronary Intervention Stent;  Surgeon: Rob Lyles MD;  Location: [facility] CARDIAC CATH LAB       Social History   I have reviewed this patient's social history and updated it with pertinent information if needed.  Social History     Tobacco Use    Smoking status: Every Day     Packs/day: .25     Types: Cigarettes     Passive exposure: Current    Smokeless tobacco: Never   Vaping Use    Vaping Use: Never used   Substance Use Topics    Alcohol use: Yes     Comment: occas    Drug use: No       Family History   I have reviewed this patient's family history and updated it with pertinent information if needed.  Family History   Problem Relation Age of Onset    Other Cancer Mother     Obesity Mother     Cervical Cancer Sister     GI problems Father     Other Cancer Sister        Prior to Admission Medications   Prior to Admission Medications   Prescriptions Last Dose Informant Patient Reported? Taking?   acetaminophen (TYLENOL) 325 MG tablet   Yes No   Sig: Take 2 tablets (650 mg) by mouth every 6 hours as needed   amiodarone (PACERONE) 200 MG tablet   No No   Sig: Take 1 tablet (200 mg) by mouth daily   amiodarone (PACERONE) 400 MG tablet   No No   Sig: Take 1 tablet (400 mg) by mouth 2 times daily for 4 days   apixaban ANTICOAGULANT (ELIQUIS) 5 MG tablet   No No   Sig: Take 1 tablet (5 mg) by mouth 2 times daily   atorvastatin (LIPITOR) 80 MG tablet   No No   Sig: Take 1 tablet (80 mg) by mouth daily   cetirizine (ZYRTEC) 10 MG tablet   Yes No   Sig: Take 10 mg by mouth daily   clonazePAM (KLONOPIN) 0.5 MG tablet   No No   Sig: Take 1 tablet (0.5 mg) by mouth at bedtime. May also take 1 tablet (0.5 mg) daily as needed for anxiety.   fish oil-omega-3 fatty acids 1000 MG capsule   Yes No   Sig: Take 1 g by mouth 2 times daily Also contains another supplement   losartan (COZAAR) 25 MG tablet    No No   Sig: Take 0.5 tablets (12.5 mg) by mouth every evening   melatonin 10 MG TABS tablet   No No   Sig: Take 1 tablet (10 mg) by mouth at bedtime   metoprolol succinate ER (TOPROL XL) 25 MG 24 hr tablet   No No   Sig: Take 1 tablet (25 mg) by mouth daily   multivitamin w/minerals (THERA-VIT-M) tablet   Yes No   Sig: Take 1 tablet by mouth daily   ticagrelor (BRILINTA) 90 MG tablet   No No   Sig: Take 1 tablet (90 mg) by mouth every 12 hours   vitamin C (ASCORBIC ACID) 1000 MG TABS   Yes No   Sig: Take 1,000 mg by mouth      Facility-Administered Medications: None     Allergies   No Known Allergies    Physical Exam   Vital Signs: Temp: 98.3  F (36.8  C) Temp src: Oral BP: 117/68 Pulse: 82   Resp: 18 SpO2: 97 % O2 Device: Nasal cannula Oxygen Delivery: 3 LPM  Weight: 146 lbs 0 oz    Gen:  Well-developed, well-nourished, in no acute distress, lying semi-supine in hospital stretcher  HEENT:  Anicteric sclera, PER, hearing intact to voice  Resp:  Expiratory wheezing with some rhonchi in the upper lobes bilaterally.   Card:  No murmur, normal S1, S2, no LE edema  Abd:  Soft per RN exam, no TTP, non-distended, normoactive bowel sounds are present  Musc:  Normal strength and movement of the major muscle groups without obvious deformity  Psych:  Good insight, oriented to person, place and time, not anxious, not agitated    Data     Recent Labs   Lab 11/06/23  2249 11/03/23  0604 11/02/23  0653 11/01/23  1402 11/01/23  0356 10/31/23  1632 10/31/23  1632   WBC 13.6* 9.9 8.3  --  11.5*   < >  --    HGB 10.8* 9.5* 10.2*  --  10.9*   < >  --    MCV 97 97 97  --  96   < >  --     300 264  --  289   < >  --    * 136 137   < > 138  --  139   POTASSIUM 4.6 4.0 4.0   < > 3.7  --  3.9   CHLORIDE 102 104 104   < > 105  --  102   CO2 21* 23 22   < > 23  --  23   BUN 16.6 26.8* 35.5*   < > 34.2*  --  31.3*   CR 0.90 1.00 0.96   < > 0.88  --  0.96   ANIONGAP 11 9 11   < > 10  --  14   PRANAV 8.3* 8.5* 8.5*   < > 8.4*  --   8.7*   * 95 94   < > 105*  --  81   ALBUMIN  --   --   --   --  2.8*  --  3.2*   PROTTOTAL  --   --   --   --  6.0*  --  6.6   BILITOTAL  --   --   --   --  0.7  --  1.1   ALKPHOS  --   --   --   --  72  --  73   ALT  --   --   --   --  44  --  42   AST  --   --   --   --  72*  --  64*    < > = values in this interval not displayed.         Recent Results (from the past 24 hour(s))   Head CT w/o contrast    Narrative    EXAM: CT HEAD W/O CONTRAST  LOCATION: [facility]  DATE: 11/6/2023    INDICATION: confusion.  recent cardiac stenting on blood thinners.  COMPARISON: 11/01/2023  TECHNIQUE: Routine CT Head without IV contrast. Multiplanar reformats. Dose reduction techniques were used.    FINDINGS:  INTRACRANIAL CONTENTS: No intracranial hemorrhage, extraaxial collection, or mass effect.  No CT evidence of acute infarct. Mild presumed chronic small vessel ischemic changes. Chronic lacunar infarct left internal capsule. Mild generalized volume loss.   No hydrocephalus.     VISUALIZED ORBITS/SINUSES/MASTOIDS: No intraorbital abnormality. Chronic opacification left maxillary sinus. Moderate mucosal thickening right maxillary sinus. Scattered fluid/membrane thickening in the left mastoid air cells. No apparent mass in the   posterior nasopharynx or skull base.    BONES/SOFT TISSUES: No acute abnormality.      Impression    IMPRESSION:  1.  No acute intracranial process.  2.  Chronic maxillary sinusitis.     Chest XR,  PA & LAT    Narrative    EXAM: XR CHEST 2 VIEWS  LOCATION: [facility]  DATE: 11/6/2023    INDICATION: Recent pneumonia. Recent stemi requiring stent.  COMPARISON: 10/30/2023.      Impression    IMPRESSION: Upper lobe predominant bilateral pulmonary infiltrates. Mild interstitial prominence in the remainder of the lungs. Infiltrate right upper lobe improved with worsening infiltrate left upper lobe. Normal heart size. Tiny effusions.

## 2023-11-07 NOTE — PROGRESS NOTES
Clinic Care Coordination Contact  Ambulatory Care Coordination to Inpatient Care Management   Hand-In Communication    Date:  November 7, 2023  Name: Duane C Johnson is enrolled in Ambulatory Care Coordination program and I am the Lead Care Coordinator.  CC Contact Information: Epic InEnteGreatsket + phone  Payor Source: Payor: MEDICARE / Plan: MEDICARE / Product Type: Medicare /   Current services in place: None   Please see the CC Snaphot and Care Management Flowsheets for specific  details of this Duane C Johnson care plan.   Additional details/specific concerns r/t this admission:    New to clinic care coordination  No additional information to share     I will follow this admission in Epic. Please feel free to contact me with questions or for further collaboration in discharge planning.   [facility]   Ruthy Paiz RN, Care Coordinator   [facility]'s   E-mail [email or web address]   [phone]

## 2023-11-07 NOTE — PROGRESS NOTES
"[facility] [place]    Patient admitted to room 2315 at approximately 0430 via cart from emergency room. Patient was accompanied by spouse.     Verbal SBAR report received from BLADE Murrell prior to patient arrival.     Patient ambulated to bed with stand-by assist. Patient alert and oriented X 1. The patient is not having any pain.  . Admission vital signs: Blood pressure 117/68, pulse 82, temperature 98.3  F (36.8  C), temperature source Oral, resp. rate 18, height 1.702 m (5' 7\"), weight 66.2 kg (146 lb), SpO2 97%. Patient and spouse were oriented to plan of care, bed controls, tv, telephone, bathroom, and visiting hours.     Risk Assessment    The following safety risks were identified during admission: fall. Yellow risk band applied: YES.     Skin Initial Assessment    This writer admitted this patient and completed a full skin assessment and Mehdi score in the Adult PCS flowsheet. Appropriate interventions initiated as needed.     Secondary skin check completed by BLADE Arnold.    Mehdi Risk Assessment  Sensory Perception: 4-->no impairment  Moisture: 4-->rarely moist  Activity: 4-->walks frequently  Mobility: 4-->no limitation  Nutrition: 3-->adequate  Friction and Shear: 2-->potential problem  Mehdi Score: 21  Friction/Shear Interventions: HOB 30 degrees or less  Mattress: Standard gel/foam mattress (IsoFlex, Atmos Air, etc.)  Bed Frame: Standard width and length    Education    Patient has a [employer] to Observation order: No  Observation education completed and documented: N/A      Nelly García RN      "

## 2023-11-07 NOTE — PROGRESS NOTES
Internal Medicine Progress Note     Service Date: 11/07/2023  [facility] - Admission Date: 11/6/2023     Problems:     Patient Active Problem List   Diagnosis    Hyperlipidemia LDL goal <130    Prostate cancer (H)    ST elevation MI (STEMI) (H)    Tobacco dependence syndrome    Adiposity    Acute respiratory failure with hypoxia (H)    Multifocal pneumonia    Elevated brain natriuretic peptide (BNP) level    Primary hypertension    SOB (shortness of breath)    Coronary artery disease due to calcified coronary lesion    Pneumonia of left upper lobe due to infectious organism    Systolic CHF (H)    Hx of cardiac arrest       Assessments and Plans:     A 74 yo male with a h/o HTN, HLD and recent admission to the [facility] (10/26-11/3) with VF/VT cardiac arrest X2, anterior STEMI s/p PCI to LAD, acute systolic CHF and respiratory failure 2/2 aspiration pneumonia (weaned off 02 prior to discharge) who p/w SOB, found to have respiratory failure 2/2 pneumonia and acute on chronic systolic CHF.    Acute hypoxic respiratory failure 2/2 pneumonia and acute on chronic systolic CHF: Likely has undiagnosed COPD given heavy smoking hx , but no e/o COPD exacerbation currently. Patient was suspected to have aspiration pneumonia during his recent admission and completed a course of ceftriaxone for same. BNP 11,149. CXR showed upper lobe predominant b/l pulmonary infiltrates (RUL infiltrate has improved compared to prior, but LILA infiltrate has worsened compared to prior). CT chest showed large right and moderate left pleural effusions, likely 2/2 combination of volume OL and pneumonia per radiology read. Echo from 10/30/23 showed EF 20-30% and apical wall akinesis; no LV thrombus. Afebrile but has leukocytosis which is improving.    -Cont lasix at 20mg BID from tmrw (received lasix 40mg IV this AM and has dropped BP as a result, so will hold off on further lasix diuresis until tmrw AM)  -Cont toprol (dose  reduced as below)  -Hold losartan for now as below  -Switch zosyn to unasyn and start vancomycin pending MRSA PCR  -Strict I/O  -Fluid restriction  -Daily weights  -IS   -Sputum cx if able to expectorate  -Urine legionella/strep pneumo ag  -MRSA swab - will stop vanc if this is neg  -Bcx X 2 if patient spikes a fever  -SLP consulted to evaluate for possible aspiration  -02 prn to maintains sats >88%  -May need thoracentesis if effusions are not improving with diuresis and if so, will need to speak with cardiology to see if we can transiently hold his eliquis + brilinta and place patient on heparin gtt in place of these temporarily    Borderline hypotension: Patient asymptomatic. SBP low 90's currently.   -Hold losartan  -Reduce toprol to 12.5mg daily  -Reduce lasix from 40mg IV BID to 20mg IV BID  -Monitor BP    ?Dysuria: UA neg for infection.     CAD, recent anterior STEMI s/p PCI/BRENDA to LAD (10/26/23):  -Cont brilinta (plan for 90D of brilinta, then plavix thereafter to complete the 1 year given cost of brilinta), toprol, statin   -Plan for cardiac MRI in 1 month    Recent VT/VF cardiac arrest (10/27/23), NSVT: S/p amio gtt at [facility].  -Cont amiodarone 400mg BID today, then continue at 200mg daily from tmrw  -Cont toprol    Apical wall akinesis: Echo with apical wall akinesis as above.   -Per cardiology at [facility], willl need AC for 3 months  -Cont eliquis    Chest wall pain 2/2 recent CPR for VT/VF cardiac arrest as below: Troponin is trending down from recent admission. Per patient, his chest discomfort has been improving since his recent admission.     Suspected TABATHA: Patient was noted to have nocturnal hypoxia at [facility] during his recent hospitalization.  -Needs o/p sleep study    Anxiety, panic d/o, possible OCD:  -Patient does not like the way klonipin makes him feel, so this was discontinued  -Start prn atarax    Nicotine dependence:  -Patient declined nicotine replacement therapy  -Encourage smoking  "cessation    Chronic anemia:  -Trend H/H    DVT Prophylaxis: Eliquis     Code Status: Full      Subjective:      Patient denies SOB at rest but is SOB with minimal activity. States that he is unsure if he's been having orthopnea. Productive cough continues, but patient unclear on color of sputum as he has been swallowing this. Reports that he's been having chest wall pain since his recent admission due to CPR at that time and states that this has been improving. States that he may have had some mild dysuria this AM. No c/o abd pain, N/V/D, leg swelling, chills, diaphoresis or PND.    Objective:      Vitals: /68 (BP Location: Right arm, Patient Position: Supine, Cuff Size: Adult Regular)   Pulse 82   Temp 98.3  F (36.8  C) (Oral)   Resp 18   Ht 1.702 m (5' 7\")   Wt 66.2 kg (146 lb)   SpO2 95%   BMI 22.87 kg/m  Temp (24hrs), Av.3  F (36.8  C), Min:98.3  F (36.8  C), Max:98.3  F (36.8  C)    Gen: A+Ox3, no acute distress  Eyes: No scleral icterus  Neck: +JVD  ENT: MMM  Heart: RRR, clear S1S2, no rubs or gallops, no murmurs  Lungs: Reduced a/e to midzones b/l, no wheeze/rhonchi  Abdomen: Normal bowel sounds, soft, no tenderness to palpation  Extremities: No lower extremity edema  Skin: No rash  Neuro: Cranial nerves grossly intact, no focal deficits    I have reviewed all labs, imaging and other investigations.     Labs/Imaging:     Results for orders placed or performed during the hospital encounter of 23 (from the past 24 hour(s))   [place] Draw    Narrative    The following orders were created for panel order [place] Draw.  Procedure                               Abnormality         Status                     ---------                               -----------         ------                     Extra Blue Top Tube[[id number]]                              Final result               Extra Red Top Tube[[id number]]                               Final result               Extra Green Top " (Lithium...[[id number]]                      Final result               Extra Purple Top Tube[[id number]]                            Final result                 Please view results for these tests on the individual orders.   Blood gas venous   Result Value Ref Range    pH Venous 7.36 7.32 - 7.43    pCO2 Venous 47 40 - 50 mm Hg    pO2 Venous 19 (L) 25 - 47 mm Hg    Bicarbonate Venous 26 21 - 28 mmol/L    Base Excess/Deficit 0.6 -7.7 - 1.9 mmol/L    FIO2 36    Extra Blue Top Tube   Result Value Ref Range    Hold Specimen JIC    Extra Red Top Tube   Result Value Ref Range    Hold Specimen JIC    Extra Green Top (Lithium Heparin) Tube   Result Value Ref Range    Hold Specimen JIC    Extra Purple Top Tube   Result Value Ref Range    Hold Specimen JIC    CBC with platelets differential    Narrative    The following orders were created for panel order CBC with platelets differential.  Procedure                               Abnormality         Status                     ---------                               -----------         ------                     CBC with platelets and d...[[id number]]  Abnormal            Final result                 Please view results for these tests on the individual orders.   Basic metabolic panel   Result Value Ref Range    Sodium 134 (L) 135 - 145 mmol/L    Potassium 4.6 3.4 - 5.3 mmol/L    Chloride 102 98 - 107 mmol/L    Carbon Dioxide (CO2) 21 (L) 22 - 29 mmol/L    Anion Gap 11 7 - 15 mmol/L    Urea Nitrogen 16.6 8.0 - 23.0 mg/dL    Creatinine 0.90 0.67 - 1.17 mg/dL    GFR Estimate 90 >60 mL/min/1.73m2    Calcium 8.3 (L) 8.8 - 10.2 mg/dL    Glucose 122 (H) 70 - 99 mg/dL   Nt probnp inpatient (BNP)   Result Value Ref Range    N terminal Pro BNP Inpatient 11,149 (H) 0 - 900 pg/mL   CBC with platelets and differential   Result Value Ref Range    WBC Count 13.6 (H) 4.0 - 11.0 10e3/uL    RBC Count 3.32 (L) 4.40 - 5.90 10e6/uL    Hemoglobin 10.8 (L) 13.3 - 17.7 g/dL    Hematocrit 32.2 (L) 40.0 -  53.0 %    MCV 97 78 - 100 fL    MCH 32.5 26.5 - 33.0 pg    MCHC 33.5 31.5 - 36.5 g/dL    RDW 12.7 10.0 - 15.0 %    Platelet Count 406 150 - 450 10e3/uL    % Neutrophils 86 %    % Lymphocytes 8 %    % Monocytes 5 %    % Eosinophils 1 %    % Basophils 0 %    % Immature Granulocytes 0 %    NRBCs per 100 WBC 0 <1 /100    Absolute Neutrophils 11.6 (H) 1.6 - 8.3 10e3/uL    Absolute Lymphocytes 1.1 0.8 - 5.3 10e3/uL    Absolute Monocytes 0.7 0.0 - 1.3 10e3/uL    Absolute Eosinophils 0.2 0.0 - 0.7 10e3/uL    Absolute Basophils 0.1 0.0 - 0.2 10e3/uL    Absolute Immature Granulocytes 0.1 <=0.4 10e3/uL    Absolute NRBCs 0.0 10e3/uL   Head CT w/o contrast    Narrative    EXAM: CT HEAD W/O CONTRAST  LOCATION: [facility]  DATE: 11/6/2023    INDICATION: confusion.  recent cardiac stenting on blood thinners.  COMPARISON: 11/01/2023  TECHNIQUE: Routine CT Head without IV contrast. Multiplanar reformats. Dose reduction techniques were used.    FINDINGS:  INTRACRANIAL CONTENTS: No intracranial hemorrhage, extraaxial collection, or mass effect.  No CT evidence of acute infarct. Mild presumed chronic small vessel ischemic changes. Chronic lacunar infarct left internal capsule. Mild generalized volume loss.   No hydrocephalus.     VISUALIZED ORBITS/SINUSES/MASTOIDS: No intraorbital abnormality. Chronic opacification left maxillary sinus. Moderate mucosal thickening right maxillary sinus. Scattered fluid/membrane thickening in the left mastoid air cells. No apparent mass in the   posterior nasopharynx or skull base.    BONES/SOFT TISSUES: No acute abnormality.      Impression    IMPRESSION:  1.  No acute intracranial process.  2.  Chronic maxillary sinusitis.     Chest XR,  PA & LAT    Narrative    EXAM: XR CHEST 2 VIEWS  LOCATION: [facility]  DATE: 11/6/2023    INDICATION: Recent pneumonia. Recent stemi requiring stent.  COMPARISON: 10/30/2023.      Impression    IMPRESSION: Upper  lobe predominant bilateral pulmonary infiltrates. Mild interstitial prominence in the remainder of the lungs. Infiltrate right upper lobe improved with worsening infiltrate left upper lobe. Normal heart size. Tiny effusions.   Troponin T, High Sensitivity   Result Value Ref Range    Troponin T, High Sensitivity 1,697 (HH) <=22 ng/L   Troponin T, High Sensitivity   Result Value Ref Range    Troponin T, High Sensitivity 1,570 (HH) <=22 ng/L   Basic metabolic panel   Result Value Ref Range    Sodium 138 135 - 145 mmol/L    Potassium 4.0 3.4 - 5.3 mmol/L    Chloride 106 98 - 107 mmol/L    Carbon Dioxide (CO2) 26 22 - 29 mmol/L    Anion Gap 6 (L) 7 - 15 mmol/L    Urea Nitrogen 14.3 8.0 - 23.0 mg/dL    Creatinine 0.88 0.67 - 1.17 mg/dL    GFR Estimate >90 >60 mL/min/1.73m2    Calcium 8.5 (L) 8.8 - 10.2 mg/dL    Glucose 102 (H) 70 - 99 mg/dL   CBC with platelets   Result Value Ref Range    WBC Count 12.3 (H) 4.0 - 11.0 10e3/uL    RBC Count 3.24 (L) 4.40 - 5.90 10e6/uL    Hemoglobin 10.5 (L) 13.3 - 17.7 g/dL    Hematocrit 31.3 (L) 40.0 - 53.0 %    MCV 97 78 - 100 fL    MCH 32.4 26.5 - 33.0 pg    MCHC 33.5 31.5 - 36.5 g/dL    RDW 12.8 10.0 - 15.0 %    Platelet Count 368 150 - 450 10e3/uL   INR   Result Value Ref Range    INR 1.39 (H) 0.85 - 1.15   Magnesium   Result Value Ref Range    Magnesium 1.9 1.7 - 2.3 mg/dL   UA with Microscopic reflex to Culture    Specimen: Urine, Midstream   Result Value Ref Range    Color Urine Colorless Colorless, Straw, Light Yellow, Yellow    Appearance Urine Clear Clear    Glucose Urine Negative Negative mg/dL    Bilirubin Urine Negative Negative    Ketones Urine Negative Negative mg/dL    Specific Gravity Urine 1.005 1.003 - 1.035    Blood Urine Negative Negative    pH Urine 5.0 5.0 - 7.0    Protein Albumin Urine Negative Negative mg/dL    Urobilinogen Urine Normal Normal, 2.0 mg/dL    Nitrite Urine Negative Negative    Leukocyte Esterase Urine Negative Negative    Mucus Urine Present (A)  None Seen /LPF    RBC Urine <1 <=2 /HPF    WBC Urine <1 <=5 /HPF    Narrative    Urine Culture not indicated   CT Chest w/o contrast    Narrative    CT CHEST WITHOUT CONTRAST  11/7/2023 11:13 AM    CLINICAL HISTORY: Recent STEMI with PCI and resulting cardiac arrest  and aspiration pneumonia 10/26. Worsening PNA and worsening SOB 11/6.    TECHNIQUE: CT chest without IV contrast. Multiplanar reformats were  obtained. Dose reduction techniques were used.  CONTRAST: None.    COMPARISON: 3/15/2023    FINDINGS:   LUNGS AND PLEURA: Since the previous exam, large right and moderate  left pleural effusions have developed. There is interstitial  thickening and groundglass infiltrate seen through both upper lobes  and to a lesser extent the lower lobes. No dense consolidation. Some  of this could be related to fluid overload, but likely infectious or  inflammatory process is present as well. The central airways are  patent.    MEDIASTINUM/AXILLAE: The aorta is normal in caliber. There are several  prominent, but still normal-sized mediastinal lymph nodes. No  pericardial effusion.    CORONARY ARTERY CALCIFICATION: Previous intervention.    UPPER ABDOMEN: Multiple calcified stones are seen within the  gallbladder.    MUSCULOSKELETAL: Degenerative changes are noted through the spine.      Impression    IMPRESSION:   1.  Interval development of large right and moderate left pleural  effusions.  2.  Likely combination of volume overload and pneumonia through both  lungs.  3.  Cholelithiasis.         Adeline Drew MD  11/07/2023 1:37 PM

## 2023-11-07 NOTE — PROGRESS NOTES
Patient alert and oriented x3. Standby Assist with transfers and cares. Used commode and urinal at bedside. Patient has order for Zosyn every 6 hours for possible pneumonia. Has new orders for inhalers. Patient denies pain or discomfort at this time. Patient noted he has a small hernia to left groin that pops out from time to time.Will continue to monitor.    Nelly García RN

## 2023-11-08 ENCOUNTER — APPOINTMENT (OUTPATIENT)
Dept: SPEECH THERAPY | Facility: CLINIC | Age: 73
DRG: 196 | End: 2023-11-08
Payer: MEDICARE

## 2023-11-08 ENCOUNTER — APPOINTMENT (OUTPATIENT)
Dept: PHYSICAL THERAPY | Facility: CLINIC | Age: 73
DRG: 196 | End: 2023-11-08
Payer: MEDICARE

## 2023-11-08 ENCOUNTER — APPOINTMENT (OUTPATIENT)
Dept: OCCUPATIONAL THERAPY | Facility: CLINIC | Age: 73
DRG: 196 | End: 2023-11-08
Payer: MEDICARE

## 2023-11-08 LAB
ANION GAP SERPL CALCULATED.3IONS-SCNC: 10 MMOL/L (ref 7–15)
BUN SERPL-MCNC: 15.8 MG/DL (ref 8–23)
CALCIUM SERPL-MCNC: 8.7 MG/DL (ref 8.8–10.2)
CHLORIDE SERPL-SCNC: 105 MMOL/L (ref 98–107)
CREAT SERPL-MCNC: 0.98 MG/DL (ref 0.67–1.17)
DEPRECATED HCO3 PLAS-SCNC: 25 MMOL/L (ref 22–29)
EGFRCR SERPLBLD CKD-EPI 2021: 81 ML/MIN/1.73M2
ERYTHROCYTE [DISTWIDTH] IN BLOOD BY AUTOMATED COUNT: 13.1 % (ref 10–15)
GAMMA INTERFERON BACKGROUND BLD IA-ACNC: 0 IU/ML
GLUCOSE SERPL-MCNC: 101 MG/DL (ref 70–99)
HCT VFR BLD AUTO: 33.8 % (ref 40–53)
HGB BLD-MCNC: 11.3 G/DL (ref 13.3–17.7)
M TB IFN-G BLD-IMP: ABNORMAL
M TB IFN-G CD4+ BCKGRND COR BLD-ACNC: 0.16 IU/ML
MAGNESIUM SERPL-MCNC: 1.9 MG/DL (ref 1.7–2.3)
MCH RBC QN AUTO: 32.5 PG (ref 26.5–33)
MCHC RBC AUTO-ENTMCNC: 33.4 G/DL (ref 31.5–36.5)
MCV RBC AUTO: 97 FL (ref 78–100)
MITOGEN IGNF BCKGRD COR BLD-ACNC: 0 IU/ML
MITOGEN IGNF BCKGRD COR BLD-ACNC: 0 IU/ML
PLATELET # BLD AUTO: 441 10E3/UL (ref 150–450)
POTASSIUM SERPL-SCNC: 3.9 MMOL/L (ref 3.4–5.3)
QUANTIFERON MITOGEN: 0.16 IU/ML
QUANTIFERON NIL TUBE: 0 IU/ML
QUANTIFERON TB1 TUBE: 0 IU/ML
QUANTIFERON TB2 TUBE: 0
RBC # BLD AUTO: 3.48 10E6/UL (ref 4.4–5.9)
SODIUM SERPL-SCNC: 140 MMOL/L (ref 135–145)
WBC # BLD AUTO: 12.6 10E3/UL (ref 4–11)

## 2023-11-08 PROCEDURE — 36415 COLL VENOUS BLD VENIPUNCTURE: CPT | Performed by: HOSPITALIST

## 2023-11-08 PROCEDURE — 250N000013 HC RX MED GY IP 250 OP 250 PS 637

## 2023-11-08 PROCEDURE — 120N000001 HC R&B MED SURG/OB

## 2023-11-08 PROCEDURE — 97110 THERAPEUTIC EXERCISES: CPT | Mod: GP

## 2023-11-08 PROCEDURE — 92610 EVALUATE SWALLOWING FUNCTION: CPT | Mod: GN | Performed by: SPEECH-LANGUAGE PATHOLOGIST

## 2023-11-08 PROCEDURE — 85027 COMPLETE CBC AUTOMATED: CPT | Performed by: HOSPITALIST

## 2023-11-08 PROCEDURE — 80048 BASIC METABOLIC PNL TOTAL CA: CPT | Performed by: HOSPITALIST

## 2023-11-08 PROCEDURE — 250N000011 HC RX IP 250 OP 636: Performed by: HOSPITALIST

## 2023-11-08 PROCEDURE — 97530 THERAPEUTIC ACTIVITIES: CPT | Mod: GP

## 2023-11-08 PROCEDURE — 99232 SBSQ HOSP IP/OBS MODERATE 35: CPT | Performed by: HOSPITALIST

## 2023-11-08 PROCEDURE — 97530 THERAPEUTIC ACTIVITIES: CPT | Mod: GO

## 2023-11-08 PROCEDURE — 97116 GAIT TRAINING THERAPY: CPT | Mod: GP

## 2023-11-08 PROCEDURE — 83735 ASSAY OF MAGNESIUM: CPT | Performed by: HOSPITALIST

## 2023-11-08 PROCEDURE — 250N000013 HC RX MED GY IP 250 OP 250 PS 637: Performed by: HOSPITALIST

## 2023-11-08 RX ADMIN — CETIRIZINE HYDROCHLORIDE 10 MG: 10 TABLET, FILM COATED ORAL at 08:47

## 2023-11-08 RX ADMIN — AMPICILLIN SODIUM AND SULBACTAM SODIUM 3 G: 2; 1 INJECTION, POWDER, FOR SOLUTION INTRAMUSCULAR; INTRAVENOUS at 20:47

## 2023-11-08 RX ADMIN — METOPROLOL SUCCINATE 12.5 MG: 25 TABLET, FILM COATED, EXTENDED RELEASE ORAL at 08:47

## 2023-11-08 RX ADMIN — AMPICILLIN SODIUM AND SULBACTAM SODIUM 3 G: 2; 1 INJECTION, POWDER, FOR SOLUTION INTRAMUSCULAR; INTRAVENOUS at 15:02

## 2023-11-08 RX ADMIN — AMPICILLIN SODIUM AND SULBACTAM SODIUM 3 G: 2; 1 INJECTION, POWDER, FOR SOLUTION INTRAMUSCULAR; INTRAVENOUS at 01:56

## 2023-11-08 RX ADMIN — APIXABAN 5 MG: 5 TABLET, FILM COATED ORAL at 08:47

## 2023-11-08 RX ADMIN — HYDROXYZINE HYDROCHLORIDE 10 MG: 10 TABLET, FILM COATED ORAL at 12:57

## 2023-11-08 RX ADMIN — OXYCODONE HYDROCHLORIDE AND ACETAMINOPHEN 1000 MG: 500 TABLET ORAL at 08:47

## 2023-11-08 RX ADMIN — ATORVASTATIN CALCIUM 80 MG: 80 TABLET, FILM COATED ORAL at 21:59

## 2023-11-08 RX ADMIN — FUROSEMIDE 20 MG: 10 INJECTION, SOLUTION INTRAMUSCULAR; INTRAVENOUS at 08:48

## 2023-11-08 RX ADMIN — APIXABAN 5 MG: 5 TABLET, FILM COATED ORAL at 20:46

## 2023-11-08 RX ADMIN — AMIODARONE HYDROCHLORIDE 200 MG: 200 TABLET ORAL at 08:47

## 2023-11-08 RX ADMIN — TICAGRELOR 90 MG: 90 TABLET ORAL at 20:55

## 2023-11-08 RX ADMIN — AMPICILLIN SODIUM AND SULBACTAM SODIUM 3 G: 2; 1 INJECTION, POWDER, FOR SOLUTION INTRAMUSCULAR; INTRAVENOUS at 08:48

## 2023-11-08 RX ADMIN — TICAGRELOR 90 MG: 90 TABLET ORAL at 08:48

## 2023-11-08 RX ADMIN — FUROSEMIDE 20 MG: 10 INJECTION, SOLUTION INTRAMUSCULAR; INTRAVENOUS at 20:45

## 2023-11-08 ASSESSMENT — ACTIVITIES OF DAILY LIVING (ADL)
ADLS_ACUITY_SCORE: 22

## 2023-11-08 NOTE — PLAN OF CARE
Problem: Risk for Delirium  Goal: Optimal Coping  Outcome: Progressing  Goal: Improved Behavioral Control  Outcome: Progressing  Intervention: Minimize Safety Risk  Recent Flowsheet Documentation  Taken 11/7/2023 1700 by Burton Cr, RN  Enhanced Safety Measures: room near unit station  Taken 11/7/2023 0955 by Burton Cr, RN  Enhanced Safety Measures: room near unit station  Goal: Improved Attention and Thought Clarity  Outcome: Progressing   Goal Outcome Evaluation: Patient participating with cares.

## 2023-11-08 NOTE — PLAN OF CARE
"Goal Outcome Evaluation:      Plan of Care Reviewed With: patient    Overall Patient Progress: improvingOverall Patient Progress: improving         Patient is up with stand by assist.  Pt oxygen saturations remain greater than 90% on room air but pt will intermittent request oxygen for comfort.  Occasional dyspnea with exertion.  Has harsh productive cough but keeps forgetting to collect sputum in cup.  Pt is alert and oriented but forgetful.  Speech is rambling and illogical at times.  IV saline locked.  On telemetry.  Denying pain.  Pt has been anxious stating \"I have no control of anything and even the remote and table won't move how I want them to\".  Pt was given prn hydroxyzine and has remained calm since.  On 1200mL fluid restriction.    "

## 2023-11-08 NOTE — PLAN OF CARE
Goal Outcome Evaluation: Readiness for Transition of Care:    Inability to care for self: Patient up through out most of nite with a few lon naps here and there.  Patient talking nonsense, asking to see  @ 0300 to see if he was better  or worse in his lung capacity. Patient on lite frequently, does not always follow commands, patient told numerous times to pull BATHROOM cord when he was done, not to get up without us, etc  1x he pulled cord and walked back to bed himself.

## 2023-11-08 NOTE — CONSULTS
CLINICAL NUTRITION SERVICES - EDUCATION NOTE    Received consult for 2 gm Na diet    Currently on same diet, at 100% of breakfast.     NUTRITION HISTORY:  Information obtained from patient:    Pt reports he cooks and creates new foods. He received heart healthy education at [facility] 11/123 but, says he has not had time to read.  He says his wife will not have any questions.  He says the food here is very bland. Pt asked if there is a twin to salt that would have no affect on the body (biologic molecular twin). Writer is not aware of anything like this on the market.  ?  NUTRITION DIAGNOSIS:  Food- and nutrition-related knowledge deficit related to low sodium diet as evidenced by CHF    INTERVENTIONS:  Provided instruction on 2 gm Sodium diet, we reviewed label reading for sodium content, pt encouraged to avoid the salt shaker and eat fresh foods as able, otherwise consider sodium content of processed foods purchased and choose lower sodium.    We discussed rationale for low sodium when eating well and for fluid restriction.    Provided  the following handouts: Low Sodium Nutrition Therapy    Pt offered magic cup as he says he is not eating well. He would like to try. Writer will order with dinner.    Goals:  Patient verbalizes understanding of diet by stating his a high level of understanding.     Follow Up:  Patient to ask any further nutrition-related questions before discharge. In addition, pt may request outpatient RD appointment.     RD contact information provided.

## 2023-11-08 NOTE — PROGRESS NOTES
Internal Medicine Progress Note     Service Date: 11/08/2023  [facility] - Admission Date: 11/6/2023     Problems:     Patient Active Problem List   Diagnosis    Hyperlipidemia LDL goal <130    Prostate cancer (H)    ST elevation MI (STEMI) (H)    Tobacco dependence syndrome    Adiposity    Acute respiratory failure with hypoxia (H)    Multifocal pneumonia    Elevated brain natriuretic peptide (BNP) level    Primary hypertension    SOB (shortness of breath)    Coronary artery disease due to calcified coronary lesion    Pneumonia of left upper lobe due to infectious organism    Systolic CHF (H)    Hx of cardiac arrest       Assessments and Plans:     A 72 yo male with a h/o HTN, HLD and recent admission to the [facility] (10/26-11/3) with VF/VT cardiac arrest X2, anterior STEMI s/p PCI to LAD, acute systolic CHF and respiratory failure 2/2 aspiration pneumonia (weaned off 02 prior to discharge) who p/w SOB, found to have respiratory failure 2/2 pneumonia and acute on chronic systolic CHF.    Acute hypoxic respiratory failure 2/2 pneumonia and acute on chronic systolic CHF: Likely has undiagnosed COPD given heavy smoking hx, but no e/o COPD exacerbation currently. Patient was suspected to have aspiration pneumonia during his recent admission and completed a course of ceftriaxone for same. BNP 11,149. CXR showed upper lobe predominant b/l pulmonary infiltrates (RUL infiltrate has improved compared to prior, but LILA infiltrate has worsened compared to prior). CT chest showed large right and moderate left pleural effusions, likely 2/2 combination of volume OL and pneumonia per radiology read. Echo from 10/30/23 showed EF 20-30% and apical wall akinesis; no LV thrombus. Urine legionella and strep pneumo ag neg. MRSA swab neg. Afebrile but has persistent, stable leukocytosis.    -Cont lasix at 20mg IV BID   -Cont toprol  -Hold losartan for now as below  -Cont unasyn  -Stopped vancomycin given neg  MRSA PCR  -Strict I/O  -Fluid restriction  -Daily weights  -IS   -Sputum cx if able to expectorate  -Bcx X 2 if patient spikes a fever  -SLP consulted appreciated. No e/o aspiration on bedside swallow eval.   -02 prn to maintains sats >88%  -May need thoracentesis if effusions are not improving with diuresis. Will plan for repeat CXR in AM with plan for thoracentesis if he still has significant effusions. Per d/w radiology, no need to hold either eliquis or brilinta for thoracentesis.     Borderline hypotension: Patient asymptomatic. SBP high 90's currently.  -Hold losartan  -Cont toprol at reduced dose of 12.5mg daily  -Monitor BP    ?Dysuria: UA neg for infection.     CAD, recent anterior STEMI s/p PCI/BRENDA to LAD (10/26/23):  -Cont brilinta (plan for 90D of brilinta, then plavix thereafter to complete the 1 year given cost of brilinta), toprol, statin   -Plan for cardiac MRI in 1 month    Recent VT/VF cardiac arrest (10/27/23), NSVT: S/p amio gtt at [facility].  -Cont amiodarone, toprol    Apical wall akinesis: Echo with apical wall akinesis as above.   -Per cardiology at [facility], willl need AC for 3 months  -Cont eliquis    Chest wall pain 2/2 recent CPR for VT/VF cardiac arrest as below: Troponin is trending down from recent admission. Per patient, his chest discomfort has been improving since his recent admission.     Suspected TABATHA: Patient was noted to have nocturnal hypoxia at [facility] during his recent hospitalization.  -Needs o/p sleep study    Anxiety, panic d/o, possible OCD:  -Patient does not like the way klonipin makes him feel, so this was discontinued  -Cont prn atarax    Nicotine dependence:  -Patient declined nicotine replacement therapy  -Encourage smoking cessation    Chronic anemia:  -Trend H/H    DVT Prophylaxis: Eliquis     Code Status: Full      Subjective:      Patient denies SOB at rest but is SOB with minimal activity. States that he slept sitting upright due to SOB with laying down. Cough  "continues. Reports that he's been having chest wall pain since his recent admission due to CPR at that time and states that this continues to improve.No c/o abd pain, N/V/D, urinary symptoms, leg swelling, chills, diaphoresis or PND.    Objective:      Vitals: BP 97/61 (BP Location: Left arm)   Pulse 76   Temp 97.3  F (36.3  C) (Oral)   Resp 20   Ht 1.702 m (5' 7\")   Wt 66.5 kg (146 lb 11.2 oz)   SpO2 100%   BMI 22.98 kg/m  Temp (24hrs), Av.3  F (36.8  C), Min:98.3  F (36.8  C), Max:98.3  F (36.8  C)    Gen: A+Ox3, no acute distress  Eyes: No scleral icterus  Neck: +JVD  ENT: MMM  Heart: RRR, clear S1S2, no rubs or gallops, no murmurs  Lungs: Reduced a/e at b/l bases, no wheeze/rhonchi noted this AM  Abdomen: Normal bowel sounds, soft, no tenderness to palpation  Extremities: No lower extremity edema  Skin: No rash  Neuro: Cranial nerves grossly intact, no focal deficits    I have reviewed all labs, imaging and other investigations.     Labs/Imaging:     Results for orders placed or performed during the hospital encounter of 23 (from the past 24 hour(s))   CBC with platelets   Result Value Ref Range    WBC Count 12.6 (H) 4.0 - 11.0 10e3/uL    RBC Count 3.48 (L) 4.40 - 5.90 10e6/uL    Hemoglobin 11.3 (L) 13.3 - 17.7 g/dL    Hematocrit 33.8 (L) 40.0 - 53.0 %    MCV 97 78 - 100 fL    MCH 32.5 26.5 - 33.0 pg    MCHC 33.4 31.5 - 36.5 g/dL    RDW 13.1 10.0 - 15.0 %    Platelet Count 441 150 - 450 10e3/uL   Basic metabolic panel   Result Value Ref Range    Sodium 140 135 - 145 mmol/L    Potassium 3.9 3.4 - 5.3 mmol/L    Chloride 105 98 - 107 mmol/L    Carbon Dioxide (CO2) 25 22 - 29 mmol/L    Anion Gap 10 7 - 15 mmol/L    Urea Nitrogen 15.8 8.0 - 23.0 mg/dL    Creatinine 0.98 0.67 - 1.17 mg/dL    GFR Estimate 81 >60 mL/min/1.73m2    Calcium 8.7 (L) 8.8 - 10.2 mg/dL    Glucose 101 (H) 70 - 99 mg/dL   Magnesium   Result Value Ref Range    Magnesium 1.9 1.7 - 2.3 mg/dL         Adeline Drew MD  2023 " 11:52 AM

## 2023-11-08 NOTE — PROGRESS NOTES
Impression:   Oral and pharyngeal phase appear WNL.  Pt has poor dentition but states he manages regular solids well with extra time.  Plan: continue regular diet consistency.         Bedside Swallow Evaluation  11/08/23    Appointment Info   Signing Clinician's Name / Credentials (SLP) Sophia Vazquez MA, CCC/SLP   General Information   Onset of Illness/Injury or Date of Surgery 11/07/23   Referring Physician Adeline Drew MD   Patient/Family Therapy Goal Statement (SLP) Did not state when asked.   Pertinent History of Current Problem Per H&P: Duane C Johnson is a 73 year old male who presented to the ED with SOB. He was recently admitted for a STEMI and had V-fib arrests during a complicated course. He was eventually discharged and and went to a TCU for rehabilitation. He states that he left because he did not feel like he was receiving the type of care he needed and he started to have worsening SOB after leaving. He notes that it feels like he cannot breath the air out of his lungs and take another breath. He also has coughing fits with some wheezing and reports scant sputum production. He has decreased exercise tolerance as well.  Pt referred to ST to assess for possible dysphagia.   General Observations Pt seen during breakfast with a regular consistency diet.  Pt is very talkative.  Reports occasional gag with solids but feels due to 'quality of the food'.   Pain Assessment   Patient Currently in Pain No   Type of Evaluation   Type of Evaluation Swallow Evaluation   Oral Motor   Oral Musculature generally intact   Structural Abnormalities none present   Mucosal Quality good   Dentition (Oral Motor)   Dentition (Oral Motor) natural dentition;significant number of missing teeth   Facial Symmetry (Oral Motor)   Facial Symmetry (Oral Motor) WNL   Lip Function (Oral Motor)   Lip Range of Motion (Oral Motor) WNL   Lip Strength (Oral Motor) WNL   Tongue Function (Oral Motor)   Tongue Strength (Oral Motor) WNL    Tongue Coordination/Speed (Oral Motor) WNL   Tongue ROM (Oral Motor) WNL   Jaw Function (Oral Motor)   Jaw Function (Oral Motor) WNL   Cough/Swallow/Gag Reflex (Oral Motor)   Volitional Throat Clear/Cough (Oral Motor) WNL   Volitional Swallow (Oral Motor) WNL   Vocal Quality/Secretion Management (Oral Motor)   Vocal Quality (Oral Motor) WNL   General Swallowing Observations   Past History of Dysphagia No   Comment, General Swallowing Observations Appropriate pace and feeding behaviors.   Respiratory Support nasal cannula   Current Diet/Method of Nutritional Intake (General Swallowing Observations, NIS) regular diet   Swallowing Evaluation Clinical swallow evaluation   Clinical Swallow Evaluation   Feeding Assistance no assistance needed   Clinical Swallow Evaluation Textures Trialed thin liquids;pureed   Clinical Swallow Eval: Thin Liquid Texture Trial   Mode of Presentation, Thin Liquids straw;self-fed   Volume of Liquid or Food Presented single and consecutive sips   Oral Phase of Swallow WFL   Pharyngeal Phase of Swallow intact   Diagnostic Statement WNL   Clinical Swallow Evaluation: Puree Solid Texture Trial   Mode of Presentation, Puree self-fed   Volume of Puree Presented appropriate bite sizes, slow pace   Oral Phase, Puree WFL   Pharyngeal Phase, Puree intact   Diagnostic Statement WNL   Clinical Swallow Evaluation: Solid Food Texture Trial   Mode of Presentation self-fed   Oral Phase WFL   Pharyngeal Phase intact   Diagnostic Statement WNL   Esophageal Phase of Swallow   Patient reports or presents with symptoms of esophageal dysphagia No   Swallowing Recommendations   Diet Consistency Recommendations regular diet   Comment, Swallowing Recommendations Oral and pharyngeal phase appear WNL.  Pt has poor dentition but states he manages regular solids well with extra time.  Plan: continue regular diet consistency.   Clinical Impression   Criteria for Skilled Therapeutic Interventions Met (SLP Eval)  Evaluation only   SLP Total Evaluation Time   Eval: oral/pharyngeal swallow function, clinical swallow Minutes (36800) 30   Total Session Time   Total Session Time (sum of timed and untimed services) 30

## 2023-11-09 ENCOUNTER — APPOINTMENT (OUTPATIENT)
Dept: GENERAL RADIOLOGY | Facility: CLINIC | Age: 73
DRG: 196 | End: 2023-11-09
Attending: HOSPITALIST
Payer: MEDICARE

## 2023-11-09 ENCOUNTER — APPOINTMENT (OUTPATIENT)
Dept: PHYSICAL THERAPY | Facility: CLINIC | Age: 73
DRG: 196 | End: 2023-11-09
Payer: MEDICARE

## 2023-11-09 ENCOUNTER — APPOINTMENT (OUTPATIENT)
Dept: OCCUPATIONAL THERAPY | Facility: CLINIC | Age: 73
DRG: 196 | End: 2023-11-09
Payer: MEDICARE

## 2023-11-09 LAB
ANION GAP SERPL CALCULATED.3IONS-SCNC: 9 MMOL/L (ref 7–15)
BUN SERPL-MCNC: 18.6 MG/DL (ref 8–23)
CALCIUM SERPL-MCNC: 8.3 MG/DL (ref 8.8–10.2)
CHLORIDE SERPL-SCNC: 104 MMOL/L (ref 98–107)
CREAT SERPL-MCNC: 1.08 MG/DL (ref 0.67–1.17)
DEPRECATED HCO3 PLAS-SCNC: 26 MMOL/L (ref 22–29)
EGFRCR SERPLBLD CKD-EPI 2021: 72 ML/MIN/1.73M2
ERYTHROCYTE [DISTWIDTH] IN BLOOD BY AUTOMATED COUNT: 13.2 % (ref 10–15)
GLUCOSE SERPL-MCNC: 99 MG/DL (ref 70–99)
HCT VFR BLD AUTO: 30.8 % (ref 40–53)
HGB BLD-MCNC: 10 G/DL (ref 13.3–17.7)
MAGNESIUM SERPL-MCNC: 1.8 MG/DL (ref 1.7–2.3)
MCH RBC QN AUTO: 31.7 PG (ref 26.5–33)
MCHC RBC AUTO-ENTMCNC: 32.5 G/DL (ref 31.5–36.5)
MCV RBC AUTO: 98 FL (ref 78–100)
PLATELET # BLD AUTO: 401 10E3/UL (ref 150–450)
POTASSIUM SERPL-SCNC: 3.5 MMOL/L (ref 3.4–5.3)
RBC # BLD AUTO: 3.15 10E6/UL (ref 4.4–5.9)
SODIUM SERPL-SCNC: 139 MMOL/L (ref 135–145)
WBC # BLD AUTO: 10 10E3/UL (ref 4–11)

## 2023-11-09 PROCEDURE — 97110 THERAPEUTIC EXERCISES: CPT | Mod: GP

## 2023-11-09 PROCEDURE — 85027 COMPLETE CBC AUTOMATED: CPT | Performed by: HOSPITALIST

## 2023-11-09 PROCEDURE — 97530 THERAPEUTIC ACTIVITIES: CPT | Mod: GO

## 2023-11-09 PROCEDURE — 120N000001 HC R&B MED SURG/OB

## 2023-11-09 PROCEDURE — 250N000013 HC RX MED GY IP 250 OP 250 PS 637: Performed by: HOSPITALIST

## 2023-11-09 PROCEDURE — 97116 GAIT TRAINING THERAPY: CPT | Mod: GP

## 2023-11-09 PROCEDURE — 80048 BASIC METABOLIC PNL TOTAL CA: CPT | Performed by: HOSPITALIST

## 2023-11-09 PROCEDURE — 97129 THER IVNTJ 1ST 15 MIN: CPT | Mod: GO

## 2023-11-09 PROCEDURE — 83735 ASSAY OF MAGNESIUM: CPT | Performed by: HOSPITALIST

## 2023-11-09 PROCEDURE — 250N000013 HC RX MED GY IP 250 OP 250 PS 637: Performed by: INTERNAL MEDICINE

## 2023-11-09 PROCEDURE — 36415 COLL VENOUS BLD VENIPUNCTURE: CPT | Performed by: HOSPITALIST

## 2023-11-09 PROCEDURE — 99232 SBSQ HOSP IP/OBS MODERATE 35: CPT | Performed by: HOSPITALIST

## 2023-11-09 PROCEDURE — 250N000013 HC RX MED GY IP 250 OP 250 PS 637

## 2023-11-09 PROCEDURE — 71045 X-RAY EXAM CHEST 1 VIEW: CPT

## 2023-11-09 PROCEDURE — 250N000011 HC RX IP 250 OP 636: Mod: JZ | Performed by: HOSPITALIST

## 2023-11-09 RX ADMIN — FUROSEMIDE 20 MG: 10 INJECTION, SOLUTION INTRAMUSCULAR; INTRAVENOUS at 08:15

## 2023-11-09 RX ADMIN — HYDROXYZINE HYDROCHLORIDE 10 MG: 10 TABLET, FILM COATED ORAL at 03:10

## 2023-11-09 RX ADMIN — AMIODARONE HYDROCHLORIDE 200 MG: 200 TABLET ORAL at 08:15

## 2023-11-09 RX ADMIN — FUROSEMIDE 20 MG: 10 INJECTION, SOLUTION INTRAMUSCULAR; INTRAVENOUS at 21:38

## 2023-11-09 RX ADMIN — APIXABAN 5 MG: 5 TABLET, FILM COATED ORAL at 08:15

## 2023-11-09 RX ADMIN — OXYCODONE HYDROCHLORIDE AND ACETAMINOPHEN 1000 MG: 500 TABLET ORAL at 08:15

## 2023-11-09 RX ADMIN — METOPROLOL SUCCINATE 12.5 MG: 25 TABLET, FILM COATED, EXTENDED RELEASE ORAL at 08:15

## 2023-11-09 RX ADMIN — AMPICILLIN SODIUM AND SULBACTAM SODIUM 3 G: 2; 1 INJECTION, POWDER, FOR SOLUTION INTRAMUSCULAR; INTRAVENOUS at 01:58

## 2023-11-09 RX ADMIN — APIXABAN 5 MG: 5 TABLET, FILM COATED ORAL at 21:39

## 2023-11-09 RX ADMIN — AMPICILLIN SODIUM AND SULBACTAM SODIUM 3 G: 2; 1 INJECTION, POWDER, FOR SOLUTION INTRAMUSCULAR; INTRAVENOUS at 13:57

## 2023-11-09 RX ADMIN — AMPICILLIN SODIUM AND SULBACTAM SODIUM 3 G: 2; 1 INJECTION, POWDER, FOR SOLUTION INTRAMUSCULAR; INTRAVENOUS at 21:37

## 2023-11-09 RX ADMIN — CETIRIZINE HYDROCHLORIDE 10 MG: 10 TABLET, FILM COATED ORAL at 08:15

## 2023-11-09 RX ADMIN — AMPICILLIN SODIUM AND SULBACTAM SODIUM 3 G: 2; 1 INJECTION, POWDER, FOR SOLUTION INTRAMUSCULAR; INTRAVENOUS at 08:14

## 2023-11-09 RX ADMIN — Medication 3 MG: at 21:46

## 2023-11-09 RX ADMIN — ATORVASTATIN CALCIUM 80 MG: 80 TABLET, FILM COATED ORAL at 21:39

## 2023-11-09 RX ADMIN — TICAGRELOR 90 MG: 90 TABLET ORAL at 21:40

## 2023-11-09 RX ADMIN — TICAGRELOR 90 MG: 90 TABLET ORAL at 09:25

## 2023-11-09 RX ADMIN — APIXABAN 5 MG: 5 TABLET, FILM COATED ORAL at 21:40

## 2023-11-09 ASSESSMENT — ACTIVITIES OF DAILY LIVING (ADL)
ADLS_ACUITY_SCORE: 22
ADLS_ACUITY_SCORE: 23
ADLS_ACUITY_SCORE: 22

## 2023-11-09 NOTE — PROGRESS NOTES
Internal Medicine Progress Note     Service Date: 11/09/2023  [facility] - Admission Date: 11/6/2023     Problems:     Patient Active Problem List   Diagnosis    Hyperlipidemia LDL goal <130    Prostate cancer (H)    ST elevation MI (STEMI) (H)    Tobacco dependence syndrome    Adiposity    Acute respiratory failure with hypoxia (H)    Multifocal pneumonia    Elevated brain natriuretic peptide (BNP) level    Primary hypertension    SOB (shortness of breath)    Coronary artery disease due to calcified coronary lesion    Pneumonia of left upper lobe due to infectious organism    Systolic CHF (H)    Hx of cardiac arrest       Assessments and Plans:     A 74 yo male with a h/o HTN, HLD and recent admission to the [facility] (10/26-11/3) with VF/VT cardiac arrest X2, anterior STEMI s/p PCI to LAD, acute systolic CHF and respiratory failure 2/2 aspiration pneumonia (weaned off 02 prior to discharge) who p/w SOB, found to have respiratory failure 2/2 pneumonia and acute on chronic systolic CHF.    Acute hypoxic respiratory failure 2/2 pneumonia and acute on chronic systolic CHF: Likely has undiagnosed COPD given heavy smoking hx, but no e/o COPD exacerbation currently. Patient was suspected to have aspiration pneumonia during his recent admission and completed a course of ceftriaxone for same. BNP 11,149. CXR showed upper lobe predominant b/l pulmonary infiltrates (RUL infiltrate has improved compared to prior, but LILA infiltrate has worsened compared to prior). CT chest showed large right and moderate left pleural effusions, likely 2/2 combination of volume OL and pneumonia per radiology read. Echo from 10/30/23 showed EF 20-30% and apical wall akinesis; no LV thrombus. Urine legionella and strep pneumo ag neg. MRSA swab neg. Repeat CXR 11/9 with pulmonary edema, possible superimposed interstitial pneumonitis, and small volume pleural effusions. Afebrile and leukocytosis has resolved. Output is  not accurate per nursing as patient is frequently urinating in the toilet despite repeated education.    -Cont lasix at 20mg IV BID   -Cont toprol  -Hold losartan for now as below  -Cont unasyn  -Strict I/O  -Fluid restriction  -Daily weights  -IS   -Sputum cx if able to expectorate  -Bcx X 2 if patient spikes a fever  -SLP consulted appreciated. No e/o aspiration on bedside swallow eval.   -02 prn to maintains sats >88%    Borderline hypotension: Patient asymptomatic.    -Hold losartan  -Cont toprol at reduced dose of 12.5mg daily  -Monitor BP    ?Dysuria: UA neg for infection.     CAD, recent anterior STEMI s/p PCI/BRENDA to LAD (10/26/23):  -Cont brilinta (plan for 90D of brilinta, then plavix thereafter to complete the 1 year given cost of brilinta), toprol, statin   -Plan for cardiac MRI in 1 month    Recent VT/VF cardiac arrest (10/27/23), NSVT: S/p amio gtt at [facility].  -Cont amiodarone, toprol    Apical wall akinesis: Echo with apical wall akinesis as above.   -Per cardiology at [facility], willl need AC for 3 months  -Cont eliquis    Chest wall pain 2/2 recent CPR for VT/VF cardiac arrest as below: Troponin is trending down from recent admission. Per patient, his chest discomfort has been improving since his recent admission.     Suspected TABATHA: Patient was noted to have nocturnal hypoxia at [facility] during his recent hospitalization.  -Needs o/p sleep study    Anxiety, panic d/o, possible OCD, insomnia: Klonopin was discontinued previously as patient did not like the way this medication makes him feel.   -Cont prn atarax  -Start qhs prn trazodone     Nicotine dependence:  -Patient declined nicotine replacement therapy  -Encourage smoking cessation    Chronic anemia: H/H stable overall.   -Trend H/H    DVT Prophylaxis: Eliquis     Code Status: Full      Subjective:      Patient denies SOB at rest but is SOB with minimal activity and laying flat. Cough is a little better per patient. Reports that he's been having  "chest wall pain since his recent admission due to CPR at that time and states that this continues to improve.No c/o abd pain, N/V/D, urinary symptoms, leg swelling, chills, diaphoresis or PND.    Objective:      Vitals: /69 (BP Location: Left arm)   Pulse 70   Temp 98.6  F (37  C) (Oral)   Resp 20   Ht 1.702 m (5' 7\")   Wt 66.5 kg (146 lb 11.2 oz)   SpO2 95%   BMI 22.98 kg/m  Temp (24hrs), Av.3  F (36.8  C), Min:98.3  F (36.8  C), Max:98.3  F (36.8  C)    Gen: A+Ox3, no acute distress  Eyes: No scleral icterus  Neck: No JVD  ENT: MMM  Heart: RRR, clear S1S2, no rubs or gallops, no murmurs  Lungs: Reduced a/e at b/l bases   Abdomen: Normal bowel sounds, soft, no tenderness to palpation  Extremities: No lower extremity edema  Skin: No rash  Neuro: Cranial nerves grossly intact, no focal deficits    I have reviewed all labs, imaging and other investigations.     Labs/Imaging:     Results for orders placed or performed during the hospital encounter of 23 (from the past 24 hour(s))   CBC with platelets   Result Value Ref Range    WBC Count 10.0 4.0 - 11.0 10e3/uL    RBC Count 3.15 (L) 4.40 - 5.90 10e6/uL    Hemoglobin 10.0 (L) 13.3 - 17.7 g/dL    Hematocrit 30.8 (L) 40.0 - 53.0 %    MCV 98 78 - 100 fL    MCH 31.7 26.5 - 33.0 pg    MCHC 32.5 31.5 - 36.5 g/dL    RDW 13.2 10.0 - 15.0 %    Platelet Count 401 150 - 450 10e3/uL   Basic metabolic panel   Result Value Ref Range    Sodium 139 135 - 145 mmol/L    Potassium 3.5 3.4 - 5.3 mmol/L    Chloride 104 98 - 107 mmol/L    Carbon Dioxide (CO2) 26 22 - 29 mmol/L    Anion Gap 9 7 - 15 mmol/L    Urea Nitrogen 18.6 8.0 - 23.0 mg/dL    Creatinine 1.08 0.67 - 1.17 mg/dL    GFR Estimate 72 >60 mL/min/1.73m2    Calcium 8.3 (L) 8.8 - 10.2 mg/dL    Glucose 99 70 - 99 mg/dL   Magnesium   Result Value Ref Range    Magnesium 1.8 1.7 - 2.3 mg/dL   XR Chest Port 1 View    Narrative    CHEST ONE VIEW PORTABLE   2023 9:02 AM    HISTORY: Reevaluate pleural " effusions.    COMPARISON: 11/17/2023, 11/6/2023.    FINDINGS/    Impression    IMPRESSION: There is central vascular prominence and diffuse  interstitial thickening, suggestive of pulmonary edema with possible  superimposed interstitial pneumonitis. There is blunting of the  costophrenic angles, suggestive of small volume pleural effusions,  left greater than right. No sign of pneumothorax. The heart size is  within normal limits. Moderate thoracic aortic atherosclerosis is  present. No acute osseous abnormality.    JOANNE REID MD         SYSTEM ID:  [id number]         Adeline Drew MD  11/09/2023 12:17 PM

## 2023-11-09 NOTE — PLAN OF CARE
Goal Outcome Evaluation:      Plan of Care Reviewed With: patient    Overall Patient Progress: improvingOverall Patient Progress: improving         Patient maintaining oxygen saturations greater than 90% on room air.  Pt has an infrequent congested cough.  Pt does experience some shortness of breath with exertion but denies any shortness of breath at rest.  Continuing to educate and encourage use of incentive spirometer.  Pt does place oxygen on for comfort at times due to his anxiety.  IV saline locked receiving IV antibiotics.  On telemetry.  Pt has intermittent confusion and carries on illogical conversations.  Encouraging ambulation and weight shifting as patient prefers to sit in the chair most of the day.  Attempting to keep track of I& O but pt continually forgetting to use urinal so unable to measure output accurately.

## 2023-11-09 NOTE — PROGRESS NOTES
Care Management Follow Up    Length of Stay (days): 2    Expected Discharge Date: 11/10/2023     Concerns to be Addressed: discharge planning     Patient plan of care discussed at interdisciplinary rounds: Yes    Anticipated Discharge Disposition: Home Care     Anticipated Discharge Services: None  Anticipated Discharge DME: None    Patient/family educated on Medicare website which has current facility and service quality ratings: yes (n/a)  Education Provided on the Discharge Plan: Yes  Patient/Family in Agreement with the Plan: yes    Referrals Placed by CM/SW: Internal Clinic Care Coordination, Homecare (n/a)  Private pay costs discussed: Not applicable    Additional Information:  Per MD at IDT rounds pt is not medically stable for discharge today. Per therapy notes, recommending homecare. CM met bedside with pt to discuss discharge planning and informed pt therapy recommending homecare. Pt in agreement with homecare, educated about home bound status, and pt given choice. Pt denied preference on agency. Referral for homecare sent through the [facility]. Pt concerned about the steps in his home, CM relayed message to PT. PT to meet with pt to work on the steps with anticipation of pt going home in the next few days.    CM received call from pts ophelia Torres at [phone] who asked about a hospital bed as wife is concerned with pt doing the steps at home. CM educated that PT is working with pt on the steps and would not recommend pt going home with homecare if pt unable to do steps. CM educated wife on medicare criteria for paying for hospital bed, wife interested in paying for bed privately. CM provided wife with name of DME companies nearby. Wife to contact DME companies about pricing and availability for DME. CM updated wife regarding homecare upon discharge, wife in agreement and CM informed her that pt was accepted with [facility] (Phone: [phone]) RN,PT,OT. Wife educated that homecare agency  will reach out to pt. Wife verified she will provide transportation on day of discharge.    [facility] (Phone: [phone])  RN,PT,OT    Transport: wife    Pallavi Carlos RNCM  Care Transitions Registered Nurse  Tele: [phone]

## 2023-11-09 NOTE — PLAN OF CARE
Patient is constantly changing his mind, talking illogically, asking hypothetical questions, patient not sleeping, asking to go see all sides and parts of this building, giving patient choices with no positive outcomes. Administered as needed Vistaril with no change in behavior, patient is on lite non-stop.

## 2023-11-09 NOTE — PLAN OF CARE
Problem: Adult Inpatient Plan of Care  Goal: Plan of Care Review  Description: The Plan of Care Review/Shift note should be completed every shift.  The Outcome Evaluation is a brief statement about your assessment that the patient is improving, declining, or no change.  This information will be displayed automatically on your shift  note.  Goal: Patient-Specific Goal (Individualized)  Outcome: Not Progressing  Goal: Absence of Hospital-Acquired Illness or Injury  Outcome: Not Progressing  Goal: Optimal Comfort and Wellbeing  Outcome: Not Progressing  Intervention: Provide Person-Centered Care  Recent Flowsheet Documentation  Taken 11/8/2023 2240 by Brigid Tovar RN  Trust Relationship/Rapport:   care explained   choices provided  Goal: Readiness for Transition of Care  Outcome: Not Progressing     Problem: Risk for Delirium  Goal: Optimal Coping  Outcome: Not Progressing  Intervention: Optimize Psychosocial Adjustment to Delirium  Flowsheets (Taken 11/8/2023 2240)  Supportive Measures:   active listening utilized   positive reinforcement provided   decision-making supported  Family/Support System Care:   caregiver stress acknowledged   involvement promoted   presence promoted   self-care encouraged   support provided  Goal: Improved Behavioral Control  Outcome: Not Progressing  Intervention: Prevent and Manage Agitation  Flowsheets (Taken 11/8/2023 2240)  Environment Familiarity/Consistency: daily routine followed  Intervention: Minimize Safety Risk  Flowsheets (Taken 11/8/2023 2240)  Communication Enhancement Strategies:   repetition utilized   call light answered in person   extra time allowed for response  Enhanced Safety Measures: room near unit station  Trust Relationship/Rapport:   care explained   choices provided  Goal: Improved Attention and Thought Clarity  Outcome: Not Progressing  Goal: Improved Sleep  Outcome: Not Progressing  Intervention: Promote Sleep  Flowsheets (Taken 11/8/2023  8609)  Sleep/Rest Enhancement:   noise level reduced   room darkened     Problem: Gas Exchange Impaired  Goal: Optimal Gas Exchange  Outcome: Not Progressing   Goal Outcome Evaluation:      Plan of Care Reviewed With: patient    Overall Patient Progress: no changeOverall Patient Progress: no change    Outcome Evaluation: Patient remains confused with paranoia at times. Oxygenation needs remain the same as well as lack of pain.

## 2023-11-10 ENCOUNTER — APPOINTMENT (OUTPATIENT)
Dept: PHYSICAL THERAPY | Facility: CLINIC | Age: 73
DRG: 196 | End: 2023-11-10
Payer: MEDICARE

## 2023-11-10 LAB
ANION GAP SERPL CALCULATED.3IONS-SCNC: 8 MMOL/L (ref 7–15)
BUN SERPL-MCNC: 21 MG/DL (ref 8–23)
CALCIUM SERPL-MCNC: 8.1 MG/DL (ref 8.8–10.2)
CHLORIDE SERPL-SCNC: 105 MMOL/L (ref 98–107)
CREAT SERPL-MCNC: 1.21 MG/DL (ref 0.67–1.17)
DEPRECATED HCO3 PLAS-SCNC: 27 MMOL/L (ref 22–29)
EGFRCR SERPLBLD CKD-EPI 2021: 63 ML/MIN/1.73M2
ERYTHROCYTE [DISTWIDTH] IN BLOOD BY AUTOMATED COUNT: 13.1 % (ref 10–15)
GLUCOSE SERPL-MCNC: 93 MG/DL (ref 70–99)
HCT VFR BLD AUTO: 29.8 % (ref 40–53)
HGB BLD-MCNC: 10 G/DL (ref 13.3–17.7)
MAGNESIUM SERPL-MCNC: 1.8 MG/DL (ref 1.7–2.3)
MCH RBC QN AUTO: 32.7 PG (ref 26.5–33)
MCHC RBC AUTO-ENTMCNC: 33.6 G/DL (ref 31.5–36.5)
MCV RBC AUTO: 97 FL (ref 78–100)
PLATELET # BLD AUTO: 370 10E3/UL (ref 150–450)
POTASSIUM SERPL-SCNC: 3.8 MMOL/L (ref 3.4–5.3)
RBC # BLD AUTO: 3.06 10E6/UL (ref 4.4–5.9)
SODIUM SERPL-SCNC: 140 MMOL/L (ref 135–145)
WBC # BLD AUTO: 8.1 10E3/UL (ref 4–11)

## 2023-11-10 PROCEDURE — 120N000001 HC R&B MED SURG/OB

## 2023-11-10 PROCEDURE — 99232 SBSQ HOSP IP/OBS MODERATE 35: CPT | Performed by: HOSPITALIST

## 2023-11-10 PROCEDURE — 97116 GAIT TRAINING THERAPY: CPT | Mod: GP | Performed by: PHYSICAL THERAPIST

## 2023-11-10 PROCEDURE — 93005 ELECTROCARDIOGRAM TRACING: CPT

## 2023-11-10 PROCEDURE — 36415 COLL VENOUS BLD VENIPUNCTURE: CPT | Performed by: HOSPITALIST

## 2023-11-10 PROCEDURE — 250N000013 HC RX MED GY IP 250 OP 250 PS 637: Performed by: HOSPITALIST

## 2023-11-10 PROCEDURE — 250N000013 HC RX MED GY IP 250 OP 250 PS 637

## 2023-11-10 PROCEDURE — 83735 ASSAY OF MAGNESIUM: CPT | Performed by: HOSPITALIST

## 2023-11-10 PROCEDURE — 250N000011 HC RX IP 250 OP 636: Mod: JZ | Performed by: HOSPITALIST

## 2023-11-10 PROCEDURE — 85027 COMPLETE CBC AUTOMATED: CPT | Performed by: HOSPITALIST

## 2023-11-10 PROCEDURE — 80048 BASIC METABOLIC PNL TOTAL CA: CPT | Performed by: HOSPITALIST

## 2023-11-10 PROCEDURE — 250N000013 HC RX MED GY IP 250 OP 250 PS 637: Performed by: INTERNAL MEDICINE

## 2023-11-10 RX ORDER — FUROSEMIDE 40 MG
40 TABLET ORAL DAILY
Status: DISCONTINUED | OUTPATIENT
Start: 2023-11-11 | End: 2023-11-12

## 2023-11-10 RX ORDER — FUROSEMIDE 10 MG/ML
20 INJECTION INTRAMUSCULAR; INTRAVENOUS EVERY 12 HOURS
Status: COMPLETED | OUTPATIENT
Start: 2023-11-10 | End: 2023-11-10

## 2023-11-10 RX ORDER — MAGNESIUM OXIDE 400 MG/1
400 TABLET ORAL EVERY 4 HOURS
Status: COMPLETED | OUTPATIENT
Start: 2023-11-10 | End: 2023-11-10

## 2023-11-10 RX ADMIN — AMPICILLIN SODIUM AND SULBACTAM SODIUM 3 G: 2; 1 INJECTION, POWDER, FOR SOLUTION INTRAMUSCULAR; INTRAVENOUS at 20:52

## 2023-11-10 RX ADMIN — AMIODARONE HYDROCHLORIDE 200 MG: 200 TABLET ORAL at 08:05

## 2023-11-10 RX ADMIN — Medication 400 MG: at 11:46

## 2023-11-10 RX ADMIN — Medication 400 MG: at 08:05

## 2023-11-10 RX ADMIN — APIXABAN 5 MG: 5 TABLET, FILM COATED ORAL at 20:53

## 2023-11-10 RX ADMIN — APIXABAN 5 MG: 5 TABLET, FILM COATED ORAL at 08:05

## 2023-11-10 RX ADMIN — TICAGRELOR 90 MG: 90 TABLET ORAL at 20:53

## 2023-11-10 RX ADMIN — OXYCODONE HYDROCHLORIDE AND ACETAMINOPHEN 1000 MG: 500 TABLET ORAL at 08:04

## 2023-11-10 RX ADMIN — AMPICILLIN SODIUM AND SULBACTAM SODIUM 3 G: 2; 1 INJECTION, POWDER, FOR SOLUTION INTRAMUSCULAR; INTRAVENOUS at 02:40

## 2023-11-10 RX ADMIN — TRAZODONE HYDROCHLORIDE 25 MG: 50 TABLET ORAL at 23:11

## 2023-11-10 RX ADMIN — TICAGRELOR 90 MG: 90 TABLET ORAL at 10:09

## 2023-11-10 RX ADMIN — AMPICILLIN SODIUM AND SULBACTAM SODIUM 3 G: 2; 1 INJECTION, POWDER, FOR SOLUTION INTRAMUSCULAR; INTRAVENOUS at 14:56

## 2023-11-10 RX ADMIN — Medication 3 MG: at 23:11

## 2023-11-10 RX ADMIN — FUROSEMIDE 20 MG: 10 INJECTION, SOLUTION INTRAMUSCULAR; INTRAVENOUS at 20:52

## 2023-11-10 RX ADMIN — AMPICILLIN SODIUM AND SULBACTAM SODIUM 3 G: 2; 1 INJECTION, POWDER, FOR SOLUTION INTRAMUSCULAR; INTRAVENOUS at 08:04

## 2023-11-10 RX ADMIN — ATORVASTATIN CALCIUM 80 MG: 80 TABLET, FILM COATED ORAL at 22:25

## 2023-11-10 RX ADMIN — METOPROLOL SUCCINATE 12.5 MG: 25 TABLET, FILM COATED, EXTENDED RELEASE ORAL at 08:05

## 2023-11-10 RX ADMIN — CETIRIZINE HYDROCHLORIDE 10 MG: 10 TABLET, FILM COATED ORAL at 08:05

## 2023-11-10 RX ADMIN — FUROSEMIDE 20 MG: 10 INJECTION, SOLUTION INTRAMUSCULAR; INTRAVENOUS at 08:05

## 2023-11-10 ASSESSMENT — ACTIVITIES OF DAILY LIVING (ADL)
ADLS_ACUITY_SCORE: 22

## 2023-11-10 NOTE — PROVIDER NOTIFICATION
Paged Dr. Drew patient has new onset A.flutter, asymptomatic at this time. ECG released per orders.

## 2023-11-10 NOTE — PLAN OF CARE
Goal Outcome Evaluation:  Patient expressed concern about anxiety during sleep, melatonin given to help with sleep, verbalizes anxiety and questions answered, patient seemed calmer and less anxious, slept well.

## 2023-11-10 NOTE — PROGRESS NOTES
Internal Medicine Progress Note     Service Date: 11/10/2023  [facility] - Admission Date: 11/6/2023     Problems:     Patient Active Problem List   Diagnosis    Hyperlipidemia LDL goal <130    Prostate cancer (H)    ST elevation MI (STEMI) (H)    Tobacco dependence syndrome    Adiposity    Acute respiratory failure with hypoxia (H)    Multifocal pneumonia    Elevated brain natriuretic peptide (BNP) level    Primary hypertension    SOB (shortness of breath)    Coronary artery disease due to calcified coronary lesion    Pneumonia of left upper lobe due to infectious organism    Systolic CHF (H)    Hx of cardiac arrest       Assessments and Plans:     A 74 yo male with a h/o HTN, HLD and recent admission to the [facility] (10/26-11/3) with VF/VT cardiac arrest X2, anterior STEMI s/p PCI to LAD, acute systolic CHF and respiratory failure 2/2 aspiration pneumonia (weaned off 02 prior to discharge) who p/w SOB, found to have respiratory failure 2/2 pneumonia and acute on chronic systolic CHF.    Acute hypoxic respiratory failure 2/2 pneumonia and acute on chronic systolic CHF: Likely has undiagnosed COPD given heavy smoking hx, but no e/o COPD exacerbation currently. Patient was suspected to have aspiration pneumonia during his recent admission and completed a course of ceftriaxone for same. BNP 11,149. CXR showed upper lobe predominant b/l pulmonary infiltrates (RUL infiltrate has improved compared to prior, but LILA infiltrate has worsened compared to prior). CT chest showed large right and moderate left pleural effusions, likely 2/2 combination of volume OL and pneumonia per radiology read. Echo from 10/30/23 showed EF 20-30% and apical wall akinesis; no LV thrombus. Urine legionella and strep pneumo ag neg. MRSA swab neg. Repeat CXR 11/9 with pulmonary edema, possible superimposed interstitial pneumonitis, and small volume pleural effusions. Afebrile and leukocytosis has resolved. Output is  not accurate per nursing as patient is frequently urinating in the toilet despite repeated education. Currently on 1L NC.    -Cont lasix at 20mg IV BID today, will transition to po in AM  -Cont toprol  -Hold losartan for now as below  -Cont unasyn  -Strict I/O  -Fluid restriction  -Daily weights  -IS   -Sputum cx if able to expectorate  -Bcx X 2 if patient spikes a fever  -SLP consulted appreciated. No e/o aspiration on bedside swallow eval.   -02 prn to maintains sats >88%    Borderline hypotension: Patient asymptomatic with SBP in 90's.    -Hold losartan  -Cont toprol at reduced dose of 12.5mg daily  -Transitioning lasix to po in AM as above  -Monitor BP    Very mild DOMENICA:  -Holding losartan  -Transition lasix to po in AM  -Trend cr    ?Dysuria: UA neg for infection.     CAD, recent anterior STEMI s/p PCI/BRENDA to LAD (10/26/23):  -Cont brilinta (plan for 90D of brilinta, then plavix thereafter to complete the 1 year given cost of brilinta), toprol, statin   -Plan for cardiac MRI in 1 month  -Starting cardiac rehab on Monday per patient    Recent VT/VF cardiac arrest (10/27/23), NSVT: S/p amio gtt at [facility].  -Cont amiodarone, toprol    Apical wall akinesis: Echo with apical wall akinesis as above.   -Per cardiology at [facility], willl need AC for 3 months  -Cont eliquis    Chest wall pain 2/2 recent CPR for VT/VF cardiac arrest as below: Troponin is trending down from recent admission. Per patient, his chest discomfort has been improving since his recent admission.     Suspected TABATHA: Patient was noted to have nocturnal hypoxia at [facility] during his recent hospitalization.  -Needs o/p sleep study    Anxiety, panic d/o, possible OCD, insomnia: Klonopin was discontinued previously as patient did not like the way this medication makes him feel.   -Cont prn atarax  -Cont prn trazodone qhs    Nicotine dependence:  -Patient declined nicotine replacement therapy  -Encourage smoking cessation    Chronic anemia: H/H stable.  "  -Trend H/H    DVT Prophylaxis: Eliquis     Code Status: Full      Subjective:      Patient denies SOB and SOB with activity is improving though has not resolved. Cough is resolving. Chest wall pain since his recent admission due to CPR at that time is getting better. No c/o abd pain, N/V/D, urinary symptoms, leg swelling, chills, diaphoresis or PND. States that he slept well last night.     Objective:      Vitals: BP 93/55 (BP Location: Right arm)   Pulse 61   Temp 98.3  F (36.8  C) (Oral)   Resp 18   Ht 1.702 m (5' 7\")   Wt 66.5 kg (146 lb 11.2 oz)   SpO2 98%   BMI 22.98 kg/m  Temp (24hrs), Av.3  F (36.8  C), Min:98.3  F (36.8  C), Max:98.3  F (36.8  C)    Gen: A+Ox3, no acute distress  Eyes: No scleral icterus  Neck: No JVD  ENT: MMM  Heart: RRR, clear S1S2, no rubs or gallops, no murmurs  Lungs: Reduced a/e at b/l bases   Abdomen: Normal bowel sounds, soft, no tenderness to palpation  Extremities: No lower extremity edema  Skin: No rash  Neuro: Cranial nerves grossly intact, no focal deficits    I have reviewed all labs, imaging and other investigations.     Labs/Imaging:     Results for orders placed or performed during the hospital encounter of 23 (from the past 24 hour(s))   CBC with platelets   Result Value Ref Range    WBC Count 8.1 4.0 - 11.0 10e3/uL    RBC Count 3.06 (L) 4.40 - 5.90 10e6/uL    Hemoglobin 10.0 (L) 13.3 - 17.7 g/dL    Hematocrit 29.8 (L) 40.0 - 53.0 %    MCV 97 78 - 100 fL    MCH 32.7 26.5 - 33.0 pg    MCHC 33.6 31.5 - 36.5 g/dL    RDW 13.1 10.0 - 15.0 %    Platelet Count 370 150 - 450 10e3/uL   Basic metabolic panel   Result Value Ref Range    Sodium 140 135 - 145 mmol/L    Potassium 3.8 3.4 - 5.3 mmol/L    Chloride 105 98 - 107 mmol/L    Carbon Dioxide (CO2) 27 22 - 29 mmol/L    Anion Gap 8 7 - 15 mmol/L    Urea Nitrogen 21.0 8.0 - 23.0 mg/dL    Creatinine 1.21 (H) 0.67 - 1.17 mg/dL    GFR Estimate 63 >60 mL/min/1.73m2    Calcium 8.1 (L) 8.8 - 10.2 mg/dL    Glucose 93 70 " - 99 mg/dL   Magnesium   Result Value Ref Range    Magnesium 1.8 1.7 - 2.3 mg/dL         Adeline Drew MD  11/10/2023 4:54 PM

## 2023-11-10 NOTE — PLAN OF CARE
"  Problem: Adult Inpatient Plan of Care  Goal: Optimal Comfort and Wellbeing  Intervention: Provide Person-Centered Care  Recent Flowsheet Documentation  Taken 11/9/2023 1723 by Doug Morrissey RN  Trust Relationship/Rapport:   care explained   choices provided   Patient is alert, but confused. He often rambles on when speaking and is a poor historian. Patient has expressed concern about the his diet and what foods he should eat when he discharges from the hospital. Writer covered some dieting options for seasoning his food and which foods to avoid.     Patient reports a concern stating that when around bed time and sometimes into the night he can wake up and experience shortness of breath and states that he is afraid he will not be able to catch his breath again. He reports that this had happened leading to one of his previous heart attacks. Writer explained that this could be anxiety that he is experiencing and that he has PRN medication ordered to manage this.     He remains on one liter of oxygen in the mid nineties for oxygen saturation.    BP 94/56 (BP Location: Left arm)   Pulse 72   Temp 98.2  F (36.8  C) (Oral)   Resp 18   Ht 1.702 m (5' 7\")   Wt 66.5 kg (146 lb 11.2 oz)   SpO2 95%   BMI 22.98 kg/m     "

## 2023-11-11 ENCOUNTER — APPOINTMENT (OUTPATIENT)
Dept: PHYSICAL THERAPY | Facility: CLINIC | Age: 73
DRG: 196 | End: 2023-11-11
Payer: MEDICARE

## 2023-11-11 LAB
ANION GAP SERPL CALCULATED.3IONS-SCNC: 8 MMOL/L (ref 7–15)
BUN SERPL-MCNC: 25.6 MG/DL (ref 8–23)
CALCIUM SERPL-MCNC: 8.2 MG/DL (ref 8.8–10.2)
CHLORIDE SERPL-SCNC: 105 MMOL/L (ref 98–107)
CREAT SERPL-MCNC: 1.25 MG/DL (ref 0.67–1.17)
DEPRECATED HCO3 PLAS-SCNC: 26 MMOL/L (ref 22–29)
EGFRCR SERPLBLD CKD-EPI 2021: 61 ML/MIN/1.73M2
GLUCOSE SERPL-MCNC: 85 MG/DL (ref 70–99)
HOLD SPECIMEN: NORMAL
MAGNESIUM SERPL-MCNC: 1.9 MG/DL (ref 1.7–2.3)
POTASSIUM SERPL-SCNC: 3.7 MMOL/L (ref 3.4–5.3)
SODIUM SERPL-SCNC: 139 MMOL/L (ref 135–145)

## 2023-11-11 PROCEDURE — 250N000013 HC RX MED GY IP 250 OP 250 PS 637

## 2023-11-11 PROCEDURE — 97116 GAIT TRAINING THERAPY: CPT | Mod: GP

## 2023-11-11 PROCEDURE — 97110 THERAPEUTIC EXERCISES: CPT | Mod: GP

## 2023-11-11 PROCEDURE — 82374 ASSAY BLOOD CARBON DIOXIDE: CPT | Performed by: HOSPITALIST

## 2023-11-11 PROCEDURE — 250N000013 HC RX MED GY IP 250 OP 250 PS 637: Performed by: INTERNAL MEDICINE

## 2023-11-11 PROCEDURE — 250N000011 HC RX IP 250 OP 636: Mod: JZ | Performed by: HOSPITALIST

## 2023-11-11 PROCEDURE — 36415 COLL VENOUS BLD VENIPUNCTURE: CPT | Performed by: HOSPITALIST

## 2023-11-11 PROCEDURE — 250N000013 HC RX MED GY IP 250 OP 250 PS 637: Performed by: HOSPITALIST

## 2023-11-11 PROCEDURE — 83735 ASSAY OF MAGNESIUM: CPT | Performed by: HOSPITALIST

## 2023-11-11 PROCEDURE — 99232 SBSQ HOSP IP/OBS MODERATE 35: CPT | Performed by: HOSPITALIST

## 2023-11-11 PROCEDURE — 120N000001 HC R&B MED SURG/OB

## 2023-11-11 RX ADMIN — APIXABAN 5 MG: 5 TABLET, FILM COATED ORAL at 08:28

## 2023-11-11 RX ADMIN — METOPROLOL SUCCINATE 12.5 MG: 25 TABLET, FILM COATED, EXTENDED RELEASE ORAL at 08:26

## 2023-11-11 RX ADMIN — AMPICILLIN SODIUM AND SULBACTAM SODIUM 3 G: 2; 1 INJECTION, POWDER, FOR SOLUTION INTRAMUSCULAR; INTRAVENOUS at 21:48

## 2023-11-11 RX ADMIN — Medication 3 MG: at 21:47

## 2023-11-11 RX ADMIN — TICAGRELOR 90 MG: 90 TABLET ORAL at 08:31

## 2023-11-11 RX ADMIN — AMPICILLIN SODIUM AND SULBACTAM SODIUM 3 G: 2; 1 INJECTION, POWDER, FOR SOLUTION INTRAMUSCULAR; INTRAVENOUS at 02:09

## 2023-11-11 RX ADMIN — TRAZODONE HYDROCHLORIDE 25 MG: 50 TABLET ORAL at 22:43

## 2023-11-11 RX ADMIN — CETIRIZINE HYDROCHLORIDE 10 MG: 10 TABLET, FILM COATED ORAL at 08:26

## 2023-11-11 RX ADMIN — HYDROXYZINE HYDROCHLORIDE 10 MG: 10 TABLET, FILM COATED ORAL at 12:55

## 2023-11-11 RX ADMIN — ATORVASTATIN CALCIUM 80 MG: 80 TABLET, FILM COATED ORAL at 21:46

## 2023-11-11 RX ADMIN — OXYCODONE HYDROCHLORIDE AND ACETAMINOPHEN 1000 MG: 500 TABLET ORAL at 08:26

## 2023-11-11 RX ADMIN — AMPICILLIN SODIUM AND SULBACTAM SODIUM 3 G: 2; 1 INJECTION, POWDER, FOR SOLUTION INTRAMUSCULAR; INTRAVENOUS at 08:25

## 2023-11-11 RX ADMIN — AMIODARONE HYDROCHLORIDE 200 MG: 200 TABLET ORAL at 08:28

## 2023-11-11 RX ADMIN — APIXABAN 5 MG: 5 TABLET, FILM COATED ORAL at 19:33

## 2023-11-11 RX ADMIN — AMPICILLIN SODIUM AND SULBACTAM SODIUM 3 G: 2; 1 INJECTION, POWDER, FOR SOLUTION INTRAMUSCULAR; INTRAVENOUS at 16:37

## 2023-11-11 RX ADMIN — TICAGRELOR 90 MG: 90 TABLET ORAL at 20:50

## 2023-11-11 ASSESSMENT — ACTIVITIES OF DAILY LIVING (ADL)
ADLS_ACUITY_SCORE: 22

## 2023-11-11 NOTE — PROGRESS NOTES
Internal Medicine Progress Note     Service Date: 11/11/2023  [facility] - Admission Date: 11/6/2023     Problems:     Patient Active Problem List   Diagnosis    Hyperlipidemia LDL goal <130    Prostate cancer (H)    ST elevation MI (STEMI) (H)    Tobacco dependence syndrome    Adiposity    Acute respiratory failure with hypoxia (H)    Multifocal pneumonia    Elevated brain natriuretic peptide (BNP) level    Primary hypertension    SOB (shortness of breath)    Coronary artery disease due to calcified coronary lesion    Pneumonia of left upper lobe due to infectious organism    Systolic CHF (H)    Hx of cardiac arrest       Assessments and Plans:     A 74 yo male with a h/o HTN, HLD and recent admission to the [facility] (10/26-11/3) with VF/VT cardiac arrest X2, anterior STEMI s/p PCI to LAD, acute systolic CHF and respiratory failure 2/2 aspiration pneumonia (weaned off 02 prior to discharge) who p/w SOB, found to have respiratory failure 2/2 pneumonia and acute on chronic systolic CHF.    Acute hypoxic respiratory failure 2/2 pneumonia and acute on chronic systolic CHF: Likely has undiagnosed COPD given heavy smoking hx, but no e/o COPD exacerbation currently. Patient was suspected to have aspiration pneumonia during his recent admission and completed a course of ceftriaxone for same. BNP 11,149. CXR showed upper lobe predominant b/l pulmonary infiltrates (RUL infiltrate has improved compared to prior, but LILA infiltrate has worsened compared to prior). CT chest showed large right and moderate left pleural effusions, likely 2/2 combination of volume OL and pneumonia per radiology read. Echo from 10/30/23 showed EF 20-30% and apical wall akinesis; no LV thrombus. Urine legionella and strep pneumo ag neg. MRSA swab neg. Repeat CXR 11/9 with pulmonary edema, possible superimposed interstitial pneumonitis, and small volume pleural effusions. Afebrile and leukocytosis resolved. Output is not  accurate per nursing as patient is frequently urinating in the toilet despite repeated education. Currently on 1L NC.    -Hold lasix today as below  -Cont toprol  -Hold losartan for now as below  -Cont unasyn  -Strict I/O  -Fluid restriction  -Daily weights  -IS   -Sputum cx if able to expectorate  -Bcx X 2 if patient spikes a fever  -SLP consulted appreciated. No e/o aspiration on bedside swallow eval.   -02 prn to maintains sats >88%    Borderline hypotension:   -Hold losartan  -Cont toprol at reduced dose of 12.5mg daily  -Monitor BP    Lightheadedness:  -Check OVS    Mild DOMENICA: Cr trending up a little further this AM.   -Holding losartan  -Hold lasix today  -Trend cr    ?A-flutter: Possible a-flutter on tele yesterday, but not captured on EKG.  -Patient is already on toprol and eliquis   -Will plan for 30 day cardiac event monitor at discharge    ?Dysuria: UA neg for infection.     CAD, recent anterior STEMI s/p PCI/BRENDA to LAD (10/26/23):  -Cont brilinta (plan for 90D of brilinta, then plavix thereafter to complete the 1 year given cost of brilinta), toprol, statin   -Plan for cardiac MRI in 1 month  -Starting cardiac rehab on Monday per patient    Recent VT/VF cardiac arrest (10/27/23), NSVT: S/p amio gtt at [facility].  -Cont amiodarone, toprol    Apical wall akinesis: Echo with apical wall akinesis as above.   -Per cardiology at [facility], willl need AC for 3 months  -Cont eliquis    Chest wall pain 2/2 recent CPR for VT/VF cardiac arrest as below: Troponin is trending down from recent admission. Per patient, his chest discomfort has been improving since his recent admission.     Suspected TABATHA: Patient was noted to have nocturnal hypoxia at [facility] during his recent hospitalization.  -Needs o/p sleep study    Anxiety, panic d/o, possible OCD, insomnia: Klonopin was discontinued previously as patient did not like the way this medication makes him feel.   -Cont prn atarax  -Cont prn trazodone qhs    Nicotine  "dependence:  -Patient declined nicotine replacement therapy  -Encourage smoking cessation    Chronic anemia: H/H stable.   -Trend H/H periodically    DVT Prophylaxis: Eliquis     Code Status: Full      Subjective:      Patient denied SOB this AM. Cough has resolved. Chest wall pain since his recent admission due to CPR at that time is getting better. No c/o abd pain, N/V/D, urinary symptoms, leg swelling, chills, diaphoresis or PND. Reported having some lightheadedness to nursing.     Objective:      Vitals: BP 90/51 (BP Location: Right arm)   Pulse 70   Temp 97.6  F (36.4  C) (Oral)   Resp 18   Ht 1.702 m (5' 7\")   Wt 66.5 kg (146 lb 11.2 oz)   SpO2 95%   BMI 22.98 kg/m  Temp (24hrs), Av.3  F (36.8  C), Min:98.3  F (36.8  C), Max:98.3  F (36.8  C)    Gen: A+Ox3, no acute distress  Eyes: No scleral icterus  Neck: No JVD  ENT: MMM  Heart: RRR, clear S1S2, no rubs or gallops, no murmurs  Lungs: CTAB  Abdomen: Normal bowel sounds, soft, no tenderness to palpation  Extremities: No lower extremity edema  Skin: No rash  Neuro: Cranial nerves grossly intact, no focal deficits    I have reviewed all labs, imaging and other investigations.     Labs/Imaging:     Results for orders placed or performed during the hospital encounter of 23 (from the past 24 hour(s))   Basic metabolic panel   Result Value Ref Range    Sodium 139 135 - 145 mmol/L    Potassium 3.7 3.4 - 5.3 mmol/L    Chloride 105 98 - 107 mmol/L    Carbon Dioxide (CO2) 26 22 - 29 mmol/L    Anion Gap 8 7 - 15 mmol/L    Urea Nitrogen 25.6 (H) 8.0 - 23.0 mg/dL    Creatinine 1.25 (H) 0.67 - 1.17 mg/dL    GFR Estimate 61 >60 mL/min/1.73m2    Calcium 8.2 (L) 8.8 - 10.2 mg/dL    Glucose 85 70 - 99 mg/dL   Magnesium   Result Value Ref Range    Magnesium 1.9 1.7 - 2.3 mg/dL   Extra Tube    Narrative    The following orders were created for panel order Extra Tube.  Procedure                               Abnormality         Status                   "   ---------                               -----------         ------                     Extra Purple Top Tube[[id number]]                            Final result                 Please view results for these tests on the individual orders.   Extra Purple Top Tube   Result Value Ref Range    Hold Specimen [facility]          Adeline Drew MD  11/11/2023 1:40 PM

## 2023-11-11 NOTE — PROGRESS NOTES
Patient is restless and anxious pulling at heart monitor and electrodes.  Writer talked with patient about taking Atarax for anxiety and patient agreed and then refused medication when brought into room.  Denies any chest discomfort, does report some dyspnea with exertion and some dizziness.  Advised patient to call for assistance when getting out of bed.  Patient agreed.

## 2023-11-11 NOTE — PLAN OF CARE
Problem: Gas Exchange Impaired  Goal: Optimal Gas Exchange  Outcome: Progressing   Goal Outcome Evaluation:      Plan of Care Reviewed With: patient    Overall Patient Progress: improvingOverall Patient Progress: improving    Outcome Evaluation: Patient is A&O with forgetfulness. dnies pain, 1l/min via NC. Denies pain. intermittent cough.

## 2023-11-11 NOTE — PLAN OF CARE
Problem: Adult Inpatient Plan of Care  Goal: Plan of Care Review  Description: The Plan of Care Review/Shift note should be completed every shift.  The Outcome Evaluation is a brief statement about your assessment that the patient is improving, declining, or no change.  This information will be displayed automatically on your shift  note.  Outcome: Progressing  Flowsheets (Taken 11/11/2023 0412)  Outcome Evaluation: Patient A/Ox4, with intermittent confusion, denies pain throoughout shift, pt on O2 at 1L via NC.  Plan of Care Reviewed With: patient  Overall Patient Progress: improving  Goal: Readiness for Transition of Care  Outcome: Progressing     Problem: Risk for Delirium  Goal: Improved Sleep  Outcome: Progressing  Intervention: Promote Sleep  Flowsheets (Taken 11/11/2023 0412)  Sleep/Rest Enhancement:   medication   noise level reduced     Problem: Gas Exchange Impaired  Goal: Optimal Gas Exchange  Outcome: Progressing  Intervention: Optimize Oxygenation and Ventilation  Flowsheets  Taken 11/11/2023 0412  Airway/Ventilation Management: airway patency maintained  Head of Bed (HOB) Positioning: HOB at 20-30 degrees  Taken 11/11/2023 0213  Head of Bed (HOB) Positioning: HOB at 15 degrees

## 2023-11-11 NOTE — PLAN OF CARE
Problem: Adult Inpatient Plan of Care  Goal: Absence of Hospital-Acquired Illness or Injury  Intervention: Prevent and Manage VTE (Venous Thromboembolism) Risk  Recent Flowsheet Documentation  Taken 11/11/2023 0830 by Sirisha Vang RN  VTE Prevention/Management: SCDs (sequential compression devices) off     Problem: Adult Inpatient Plan of Care  Goal: Absence of Hospital-Acquired Illness or Injury  Intervention: Identify and Manage Fall Risk  Recent Flowsheet Documentation  Taken 11/11/2023 0830 by Sirisha Vang RN  Safety Promotion/Fall Prevention:   activity supervised   assistive device/personal items within reach   clutter free environment maintained   nonskid shoes/slippers when out of bed   patient and family education   mobility aid in reach     Problem: Gas Exchange Impaired  Goal: Optimal Gas Exchange  Outcome: Progressing   Goal Outcome Evaluation:      Plan of Care Reviewed With: patient    Overall Patient Progress: improvingOverall Patient Progress: improving    Outcome Evaluation: pt A&O x4 with intermittent forgetfulness. Denies pain. on RA. SpO2 %. Atarax given once for anxiety with relief. Tele monitor on.

## 2023-11-11 NOTE — PROVIDER NOTIFICATION
Paged Dr. Drew, patient reports feeling like he can't take a good breath. SpO2 at % lung sounds are clear. No palor.

## 2023-11-12 ENCOUNTER — HOSPITAL ENCOUNTER (INPATIENT)
Facility: CLINIC | Age: 73
LOS: 2 days | Discharge: HOME | DRG: 291 | End: 2023-11-15
Attending: EMERGENCY MEDICINE | Admitting: STUDENT IN AN ORGANIZED HEALTH CARE EDUCATION/TRAINING PROGRAM
Payer: MEDICARE

## 2023-11-12 ENCOUNTER — APPOINTMENT (OUTPATIENT)
Dept: PHYSICAL THERAPY | Facility: CLINIC | Age: 73
DRG: 196 | End: 2023-11-12
Payer: MEDICARE

## 2023-11-12 ENCOUNTER — APPOINTMENT (OUTPATIENT)
Dept: GENERAL RADIOLOGY | Facility: CLINIC | Age: 73
DRG: 291 | End: 2023-11-12
Attending: EMERGENCY MEDICINE
Payer: MEDICARE

## 2023-11-12 VITALS
SYSTOLIC BLOOD PRESSURE: 100 MMHG | WEIGHT: 146.7 LBS | TEMPERATURE: 97.3 F | HEIGHT: 67 IN | HEART RATE: 66 BPM | OXYGEN SATURATION: 96 % | BODY MASS INDEX: 23.02 KG/M2 | DIASTOLIC BLOOD PRESSURE: 65 MMHG | RESPIRATION RATE: 18 BRPM

## 2023-11-12 DIAGNOSIS — F41.9 ANXIETY: ICD-10-CM

## 2023-11-12 DIAGNOSIS — I25.10 CORONARY ARTERY DISEASE DUE TO CALCIFIED CORONARY LESION: ICD-10-CM

## 2023-11-12 DIAGNOSIS — R06.02 SOB (SHORTNESS OF BREATH): ICD-10-CM

## 2023-11-12 DIAGNOSIS — I21.02 ST ELEVATION MYOCARDIAL INFARCTION INVOLVING LEFT ANTERIOR DESCENDING (LAD) CORONARY ARTERY (H): ICD-10-CM

## 2023-11-12 DIAGNOSIS — I50.9 CONGESTIVE HEART FAILURE, UNSPECIFIED HF CHRONICITY, UNSPECIFIED HEART FAILURE TYPE (H): ICD-10-CM

## 2023-11-12 DIAGNOSIS — Z86.74 HX OF CARDIAC ARREST: Primary | ICD-10-CM

## 2023-11-12 DIAGNOSIS — I50.23 ACUTE ON CHRONIC SYSTOLIC CONGESTIVE HEART FAILURE (H): ICD-10-CM

## 2023-11-12 DIAGNOSIS — I25.84 CORONARY ARTERY DISEASE DUE TO CALCIFIED CORONARY LESION: ICD-10-CM

## 2023-11-12 DIAGNOSIS — I50.21 ACUTE SYSTOLIC HEART FAILURE (H): ICD-10-CM

## 2023-11-12 LAB
ALBUMIN SERPL BCG-MCNC: 3.7 G/DL (ref 3.5–5.2)
ALP SERPL-CCNC: 121 U/L (ref 40–129)
ALT SERPL W P-5'-P-CCNC: 69 U/L (ref 0–70)
ANION GAP SERPL CALCULATED.3IONS-SCNC: 11 MMOL/L (ref 7–15)
ANION GAP SERPL CALCULATED.3IONS-SCNC: 13 MMOL/L (ref 7–15)
AST SERPL W P-5'-P-CCNC: 53 U/L (ref 0–45)
BASE EXCESS BLDV CALC-SCNC: 1.5 MMOL/L (ref -7.7–1.9)
BASOPHILS # BLD AUTO: 0.1 10E3/UL (ref 0–0.2)
BASOPHILS NFR BLD AUTO: 1 %
BILIRUB SERPL-MCNC: 0.4 MG/DL
BUN SERPL-MCNC: 19.3 MG/DL (ref 8–23)
BUN SERPL-MCNC: 21.8 MG/DL (ref 8–23)
CALCIUM SERPL-MCNC: 8.4 MG/DL (ref 8.8–10.2)
CALCIUM SERPL-MCNC: 9.1 MG/DL (ref 8.8–10.2)
CHLORIDE SERPL-SCNC: 103 MMOL/L (ref 98–107)
CHLORIDE SERPL-SCNC: 106 MMOL/L (ref 98–107)
CREAT SERPL-MCNC: 1.14 MG/DL (ref 0.67–1.17)
CREAT SERPL-MCNC: 1.31 MG/DL (ref 0.67–1.17)
DEPRECATED HCO3 PLAS-SCNC: 24 MMOL/L (ref 22–29)
DEPRECATED HCO3 PLAS-SCNC: 24 MMOL/L (ref 22–29)
EGFRCR SERPLBLD CKD-EPI 2021: 57 ML/MIN/1.73M2
EGFRCR SERPLBLD CKD-EPI 2021: 68 ML/MIN/1.73M2
EOSINOPHIL # BLD AUTO: 0.1 10E3/UL (ref 0–0.7)
EOSINOPHIL NFR BLD AUTO: 1 %
ERYTHROCYTE [DISTWIDTH] IN BLOOD BY AUTOMATED COUNT: 13.2 % (ref 10–15)
GLUCOSE SERPL-MCNC: 101 MG/DL (ref 70–99)
GLUCOSE SERPL-MCNC: 147 MG/DL (ref 70–99)
HCO3 BLDV-SCNC: 28 MMOL/L (ref 21–28)
HCT VFR BLD AUTO: 34.7 % (ref 40–53)
HGB BLD-MCNC: 11.5 G/DL (ref 13.3–17.7)
IMM GRANULOCYTES # BLD: 0.1 10E3/UL
IMM GRANULOCYTES NFR BLD: 0 %
LACTATE SERPL-SCNC: 1.6 MMOL/L (ref 0.7–2)
LYMPHOCYTES # BLD AUTO: 1.3 10E3/UL (ref 0.8–5.3)
LYMPHOCYTES NFR BLD AUTO: 8 %
MAGNESIUM SERPL-MCNC: 1.9 MG/DL (ref 1.7–2.3)
MCH RBC QN AUTO: 32.2 PG (ref 26.5–33)
MCHC RBC AUTO-ENTMCNC: 33.1 G/DL (ref 31.5–36.5)
MCV RBC AUTO: 97 FL (ref 78–100)
MONOCYTES # BLD AUTO: 0.6 10E3/UL (ref 0–1.3)
MONOCYTES NFR BLD AUTO: 4 %
NEUTROPHILS # BLD AUTO: 13.3 10E3/UL (ref 1.6–8.3)
NEUTROPHILS NFR BLD AUTO: 86 %
NRBC # BLD AUTO: 0 10E3/UL
NRBC BLD AUTO-RTO: 0 /100
NT-PROBNP SERPL-MCNC: ABNORMAL PG/ML (ref 0–900)
O2/TOTAL GAS SETTING VFR VENT: 0 %
PCO2 BLDV: 49 MM HG (ref 40–50)
PH BLDV: 7.36 [PH] (ref 7.32–7.43)
PLATELET # BLD AUTO: 511 10E3/UL (ref 150–450)
PO2 BLDV: 42 MM HG (ref 25–47)
POTASSIUM SERPL-SCNC: 3.7 MMOL/L (ref 3.4–5.3)
POTASSIUM SERPL-SCNC: 3.8 MMOL/L (ref 3.4–5.3)
PROT SERPL-MCNC: 7.4 G/DL (ref 6.4–8.3)
RBC # BLD AUTO: 3.57 10E6/UL (ref 4.4–5.9)
SODIUM SERPL-SCNC: 140 MMOL/L (ref 135–145)
SODIUM SERPL-SCNC: 141 MMOL/L (ref 135–145)
TROPONIN T SERPL HS-MCNC: 317 NG/L
WBC # BLD AUTO: 15.5 10E3/UL (ref 4–11)

## 2023-11-12 PROCEDURE — 99285 EMERGENCY DEPT VISIT HI MDM: CPT | Mod: 25 | Performed by: EMERGENCY MEDICINE

## 2023-11-12 PROCEDURE — 250N000013 HC RX MED GY IP 250 OP 250 PS 637: Performed by: HOSPITALIST

## 2023-11-12 PROCEDURE — 93010 ELECTROCARDIOGRAM REPORT: CPT | Performed by: EMERGENCY MEDICINE

## 2023-11-12 PROCEDURE — 71045 X-RAY EXAM CHEST 1 VIEW: CPT

## 2023-11-12 PROCEDURE — 250N000013 HC RX MED GY IP 250 OP 250 PS 637

## 2023-11-12 PROCEDURE — 36415 COLL VENOUS BLD VENIPUNCTURE: CPT | Performed by: HOSPITALIST

## 2023-11-12 PROCEDURE — 93005 ELECTROCARDIOGRAM TRACING: CPT

## 2023-11-12 PROCEDURE — 97116 GAIT TRAINING THERAPY: CPT | Mod: GP

## 2023-11-12 PROCEDURE — 250N000011 HC RX IP 250 OP 636: Performed by: HOSPITALIST

## 2023-11-12 PROCEDURE — 83880 ASSAY OF NATRIURETIC PEPTIDE: CPT | Performed by: EMERGENCY MEDICINE

## 2023-11-12 PROCEDURE — 250N000011 HC RX IP 250 OP 636: Performed by: EMERGENCY MEDICINE

## 2023-11-12 PROCEDURE — 80053 COMPREHEN METABOLIC PANEL: CPT | Performed by: HOSPITALIST

## 2023-11-12 PROCEDURE — 83605 ASSAY OF LACTIC ACID: CPT | Performed by: EMERGENCY MEDICINE

## 2023-11-12 PROCEDURE — 99239 HOSP IP/OBS DSCHRG MGMT >30: CPT | Performed by: HOSPITALIST

## 2023-11-12 PROCEDURE — 85025 COMPLETE CBC W/AUTO DIFF WBC: CPT | Performed by: EMERGENCY MEDICINE

## 2023-11-12 PROCEDURE — 250N000009 HC RX 250: Performed by: INTERNAL MEDICINE

## 2023-11-12 PROCEDURE — 97110 THERAPEUTIC EXERCISES: CPT | Mod: GP

## 2023-11-12 PROCEDURE — 999N000157 HC STATISTIC RCP TIME EA 10 MIN

## 2023-11-12 PROCEDURE — 94660 CPAP INITIATION&MGMT: CPT

## 2023-11-12 PROCEDURE — 87637 SARSCOV2&INF A&B&RSV AMP PRB: CPT | Performed by: EMERGENCY MEDICINE

## 2023-11-12 PROCEDURE — 82803 BLOOD GASES ANY COMBINATION: CPT | Performed by: EMERGENCY MEDICINE

## 2023-11-12 PROCEDURE — 99291 CRITICAL CARE FIRST HOUR: CPT | Mod: 25

## 2023-11-12 PROCEDURE — 83735 ASSAY OF MAGNESIUM: CPT | Performed by: HOSPITALIST

## 2023-11-12 PROCEDURE — 36415 COLL VENOUS BLD VENIPUNCTURE: CPT | Performed by: EMERGENCY MEDICINE

## 2023-11-12 PROCEDURE — 96374 THER/PROPH/DIAG INJ IV PUSH: CPT

## 2023-11-12 PROCEDURE — 84484 ASSAY OF TROPONIN QUANT: CPT | Performed by: EMERGENCY MEDICINE

## 2023-11-12 RX ORDER — LOSARTAN POTASSIUM 25 MG/1
12.5 TABLET ORAL DAILY
Start: 2023-11-13 | End: 2023-11-12

## 2023-11-12 RX ORDER — AMIODARONE HYDROCHLORIDE 200 MG/1
200 TABLET ORAL DAILY
Qty: 90 TABLET | Refills: 0 | Status: SHIPPED | OUTPATIENT
Start: 2023-11-12 | End: 2023-11-30

## 2023-11-12 RX ORDER — MAGNESIUM OXIDE 400 MG/1
400 TABLET ORAL EVERY 4 HOURS
Status: COMPLETED | OUTPATIENT
Start: 2023-11-12 | End: 2023-11-12

## 2023-11-12 RX ORDER — FUROSEMIDE 20 MG
20 TABLET ORAL DAILY
Status: DISCONTINUED | OUTPATIENT
Start: 2023-11-12 | End: 2023-11-12 | Stop reason: HOSPADM

## 2023-11-12 RX ORDER — LOSARTAN POTASSIUM 25 MG/1
12.5 TABLET ORAL DAILY
Qty: 90 TABLET | Refills: 0 | Status: SHIPPED | OUTPATIENT
Start: 2023-11-13 | End: 2023-11-20

## 2023-11-12 RX ORDER — METOPROLOL SUCCINATE 25 MG/1
12.5 TABLET, EXTENDED RELEASE ORAL DAILY
Qty: 60 TABLET | Refills: 0 | Status: SHIPPED | OUTPATIENT
Start: 2023-11-13 | End: 2023-11-20

## 2023-11-12 RX ORDER — TRAZODONE HYDROCHLORIDE 50 MG/1
25 TABLET, FILM COATED ORAL
Qty: 30 TABLET | Refills: 0 | Status: SHIPPED | OUTPATIENT
Start: 2023-11-12 | End: 2023-11-20

## 2023-11-12 RX ORDER — FUROSEMIDE 20 MG
20 TABLET ORAL DAILY
Qty: 90 TABLET | Refills: 0 | Status: ON HOLD | OUTPATIENT
Start: 2023-11-13 | End: 2023-11-15

## 2023-11-12 RX ORDER — HYDROXYZINE HYDROCHLORIDE 10 MG/1
10 TABLET, FILM COATED ORAL EVERY 8 HOURS PRN
Qty: 30 TABLET | Refills: 0 | Status: SHIPPED | OUTPATIENT
Start: 2023-11-12 | End: 2023-11-20

## 2023-11-12 RX ORDER — METOPROLOL SUCCINATE 25 MG/1
12.5 TABLET, EXTENDED RELEASE ORAL DAILY
Start: 2023-11-13 | End: 2023-11-12

## 2023-11-12 RX ADMIN — MIDAZOLAM 1.5 MG: 1 INJECTION INTRAMUSCULAR; INTRAVENOUS at 22:51

## 2023-11-12 RX ADMIN — APIXABAN 5 MG: 5 TABLET, FILM COATED ORAL at 09:11

## 2023-11-12 RX ADMIN — HYDROXYZINE HYDROCHLORIDE 10 MG: 10 TABLET, FILM COATED ORAL at 00:07

## 2023-11-12 RX ADMIN — METOPROLOL SUCCINATE 12.5 MG: 25 TABLET, FILM COATED, EXTENDED RELEASE ORAL at 09:12

## 2023-11-12 RX ADMIN — AMPICILLIN SODIUM AND SULBACTAM SODIUM 3 G: 2; 1 INJECTION, POWDER, FOR SOLUTION INTRAMUSCULAR; INTRAVENOUS at 04:12

## 2023-11-12 RX ADMIN — AMIODARONE HYDROCHLORIDE 200 MG: 200 TABLET ORAL at 09:12

## 2023-11-12 RX ADMIN — CETIRIZINE HYDROCHLORIDE 10 MG: 10 TABLET, FILM COATED ORAL at 09:11

## 2023-11-12 RX ADMIN — OXYCODONE HYDROCHLORIDE AND ACETAMINOPHEN 1000 MG: 500 TABLET ORAL at 09:08

## 2023-11-12 RX ADMIN — FUROSEMIDE 20 MG: 20 TABLET ORAL at 09:10

## 2023-11-12 RX ADMIN — IPRATROPIUM BROMIDE AND ALBUTEROL SULFATE 3 ML: 2.5; .5 SOLUTION RESPIRATORY (INHALATION) at 00:07

## 2023-11-12 RX ADMIN — TICAGRELOR 90 MG: 90 TABLET ORAL at 09:10

## 2023-11-12 RX ADMIN — Medication 400 MG: at 09:09

## 2023-11-12 RX ADMIN — LOSARTAN POTASSIUM 12.5 MG: 25 TABLET, FILM COATED ORAL at 09:13

## 2023-11-12 RX ADMIN — AMPICILLIN SODIUM AND SULBACTAM SODIUM 3 G: 2; 1 INJECTION, POWDER, FOR SOLUTION INTRAMUSCULAR; INTRAVENOUS at 09:19

## 2023-11-12 ASSESSMENT — ENCOUNTER SYMPTOMS
PALPITATIONS: 0
WEAKNESS: 0
APPETITE CHANGE: 0
CHILLS: 0
MYALGIAS: 0
FEVER: 0
FATIGUE: 0
NUMBNESS: 0
SHORTNESS OF BREATH: 1
COUGH: 0
HEADACHES: 0
LIGHT-HEADEDNESS: 0
NERVOUS/ANXIOUS: 1
CHEST TIGHTNESS: 0
DIAPHORESIS: 0
ABDOMINAL PAIN: 0

## 2023-11-12 ASSESSMENT — ACTIVITIES OF DAILY LIVING (ADL)
ADLS_ACUITY_SCORE: 22
ADLS_ACUITY_SCORE: 35
ADLS_ACUITY_SCORE: 22
ADLS_ACUITY_SCORE: 22

## 2023-11-12 NOTE — PROGRESS NOTES
Patient continues to be anxious and unable to sleep this shift. Reports dyspnea. Given PRN duoneb which was effective. Also given PRN Atarax. Forgetful at times but overall pleasant. Continues with ABX. Lung sounds diminished in left lower lobe. Frequent congested cough.

## 2023-11-12 NOTE — PROGRESS NOTES
Care Management Discharge Note    Discharge Date: 11/12/23     Discharge Disposition: Home Care; [facility] (Phone: [phone])  RN, PT/OT & HHA    Discharge Services: None    Discharge DME: None    Discharge Transportation: family or friend will provide    Private pay costs discussed: Not applicable    Does the patient's insurance plan have a 3 day qualifying hospital stay waiver?  No    PAS Confirmation Code:    Patient/family educated on Medicare website which has current facility and service quality ratings: yes (n/a)    Education Provided on the Discharge Plan: Yes  Persons Notified of Discharge Plans: Patient & RN called the wife  Patient/Family in Agreement with the Plan: yes    Handoff Referral Completed: Yes    Additional Information:  Per IDT rounds, MD states that pt is medically stable for discharge today.    PT/OT recommends home with home care RN, PT/OT & HHA services.  Pt & wife are in agreement. Pt is accepted for cares with [facility] (Phone: [phone]).    Transportation discussed and pt's wife to transport.       DMITRY Wright

## 2023-11-12 NOTE — PROGRESS NOTES
[facility] DISCHARGE NOTE    Patient discharged to home at 12:47 PM via wheel chair. Accompanied by spouse and staff. Discharge instructions reviewed with patient and spouse, opportunity offered to ask questions. Prescriptions filled and sent with patient upon discharge. All belongings sent with patient.    Burton Cr RN

## 2023-11-12 NOTE — DISCHARGE SUMMARY
Internal Medicine Discharge Summary     Name: Duane C Johnson  YOB: 1950 (Age: 73 year old)  [facility]  Admitting physician: Thomas Flanagan MD   Discharging Physician: Adeline Drew MD   Date of Admission: 11/6/2023  Date of Discharge: 11/12/2023     Primary Discharge Diagnosis:      Acute hypoxic respiratory failure 2/2 pneumonia and acute on chronic systolic CHF    HPI (per admitting physician):      Duane C Johnson is a 73 year old male who presented to the ED with SOB. He was recently admitted for a STEMI and had V-fib arrests during a complicated course. He was eventually discharged and and went to a TCU for rehabilitation. He states that he left because he did not feel like he was receiving the type of care he needed and he started to have worsening SOB after leaving. He notes that it feels like he cannot breath the air out of his lungs and take another breath. He also has coughing fits with some wheezing and reports scant sputum production. He has decreased exercise tolerance as well. He denies any chest pain, pleuritic pain, fevers, chills, N/V/D/C, abdominal pain, LE edema. He does have orthopnea, however. He has been compliant with his medications. He is very much interested in knowing what he should do to prevent a future heart attack or cardiac arrest. He smokes roughly 1ppd and is interested in stopping.        Hospital Course by Main Problems:      A 74 yo male with a h/o HTN, HLD and recent admission to the [facility] (10/26-11/3) with VF/VT cardiac arrest X2, anterior STEMI s/p PCI to LAD, acute systolic CHF and respiratory failure 2/2 aspiration pneumonia (weaned off 02 prior to discharge) who p/w SOB, found to have respiratory failure 2/2 pneumonia and acute on chronic systolic CHF.     Acute hypoxic respiratory failure 2/2 pneumonia and acute on chronic systolic CHF: Likely has undiagnosed COPD given heavy smoking hx, but there was no e/o COPD exacerbation  during this admission. Patient was suspected to have aspiration pneumonia during his recent admission and completed a course of ceftriaxone for same. BNP was 11,149 on admission. CXR showed upper lobe predominant b/l pulmonary infiltrates (RUL infiltrate has improved compared to prior, but LILA infiltrate has worsened compared to prior). CT chest showed large right and moderate left pleural effusions, likely 2/2 combination of volume OL and pneumonia per radiology read. Echo from 10/30/23 showed EF 20-30% and apical wall akinesis; no LV thrombus. Urine legionella and strep pneumo ag neg. MRSA swab neg. Repeat CXR 11/9 following some lasix diuresis showed persistent pulmonary edema, possible superimposed interstitial pneumonitis, and small volume pleural effusions. The patient was diuresed with IV lasix until he was euvolemic and his pneumonia was treated with unasyn. The patient improved considerably with these measures and he was weaned off of supplemental oxygen prior to discharge home. He was continued on lasix 20mg daily and will complete the antibiotic course with augmentin at discharge. The patient was educated on the importance of compliance with medications and fluid/salt restrictions at home. He will need a repeat CXR with his PCP in 4-6 weeks and he will f/u with cardiology as an outpatient.      Hypotension: The patient's SBP was running in the high 80's to low 90's earlier in his hospital course and he c/o mild lightheadedness with this although his OVS were normal. The patient's SBP improved to 100 after his toprol dose was reduced to 12.5mg daily from 25mg daily PTA. The patient was asymptomatic while ambulating the guo prior to discharge home.      Mild DOMENICA: The patient developed mild DOMENICA while hospitalized and this was likely 2/2 IV lasix diuresis. Lasix and losartan were transiently held with resolution of his DOMENICA.      ?A-flutter: Possible rate controlled a-flutter on tele earlier in his hospital  "course, but this could not be captured on EKG. A 30 day cardiac event monitor was arranged for patient at discharge. He was continued on his PTA toprol and eliquis.     CAD, recent anterior STEMI s/p PCI/BRENDA to LAD (10/26/23): Cont brilinta (plan for 90D of brilinta, then plavix thereafter to complete the 1 year given cost of brilinta), toprol, statin. The patient will f/ with cardiology at discharge with plan for cardiac MRI in 1 month. The patient was referred for cardiac rehab during this recent hospitalization.      Recent VT/VF cardiac arrest (10/27/23), NSVT: The patient completed the amiodarone load prior to discharge. He was continued on amiodarone and toprol at discharge.      Apical wall akinesis: Echo with apical wall akinesis as above. Per cardiology at [facility], he willl need AC for 3 months and his PTA eliquis was continued at discharge.      Chest wall pain 2/2 recent CPR for VT/VF cardiac arrest as below: Troponin is trending down from recent admission. Per patient, his chest discomfort has been improving since his recent admission and had nearly resolved at the time of discharge.      Suspected TABATHA: Patient was noted to have nocturnal hypoxia at [facility] during his recent hospitalization and o/p sleep study was recommended.      Anxiety, panic d/o, possible OCD, insomnia: Klonopin was discontinued previously as patient did not like the way this medication makes him feel. The patient was started on prn trazodone and atarax.      Nicotine dependence: Patient declined nicotine replacement therapy during this admission. He was strongly encouraged to stop smoking.      Chronic anemia: H/H was stable at the time of discharge home.     Discharge Exam:     /65 (BP Location: Left arm)   Pulse 66   Temp 97.3  F (36.3  C) (Oral)   Resp 18   Ht 1.702 m (5' 7\")   Wt 66.5 kg (146 lb 11.2 oz)   SpO2 96%   BMI 22.98 kg/m    Gen: A+Ox3, no acute distress  Eyes: No scleral icterus  Neck: No JVD  ENT: " MMM  Heart: RRR, clear S1S2, no rubs or gallops, no murmurs  Lungs: CTAB  Abdomen: Normal bowel sounds, soft, no tenderness to palpation  Extremities: No lower extremity edema  Skin: No rash  Neuro: Cranial nerves grossly intact, no focal deficits      Discharge/Recent Laboratory Results:     Results for orders placed or performed during the hospital encounter of 11/06/23 (from the past 24 hour(s))   Basic metabolic panel   Result Value Ref Range    Sodium 141 135 - 145 mmol/L    Potassium 3.7 3.4 - 5.3 mmol/L    Chloride 106 98 - 107 mmol/L    Carbon Dioxide (CO2) 24 22 - 29 mmol/L    Anion Gap 11 7 - 15 mmol/L    Urea Nitrogen 21.8 8.0 - 23.0 mg/dL    Creatinine 1.14 0.67 - 1.17 mg/dL    GFR Estimate 68 >60 mL/min/1.73m2    Calcium 8.4 (L) 8.8 - 10.2 mg/dL    Glucose 101 (H) 70 - 99 mg/dL   Magnesium   Result Value Ref Range    Magnesium 1.9 1.7 - 2.3 mg/dL       Condition on Discharge:   -Good    Discharge Medications:      Current Discharge Medication List        START taking these medications    Details   amoxicillin-clavulanate (AUGMENTIN) 875-125 MG tablet Take 1 tablet by mouth 2 times daily for 2 days  Qty: 4 tablet, Refills: 0    Associated Diagnoses: Multifocal pneumonia      furosemide (LASIX) 20 MG tablet Take 1 tablet (20 mg) by mouth daily  Qty: 90 tablet, Refills: 0    Associated Diagnoses: Acute systolic heart failure (H)      hydrOXYzine (ATARAX) 10 MG tablet Take 1 tablet (10 mg) by mouth every 8 hours as needed for anxiety  Qty: 30 tablet, Refills: 0    Associated Diagnoses: Anxiety      traZODone (DESYREL) 50 MG tablet Take 0.5 tablets (25 mg) by mouth nightly as needed for sleep  Qty: 30 tablet, Refills: 0    Associated Diagnoses: Insomnia, unspecified type           CONTINUE these medications which have CHANGED    Details   losartan (COZAAR) 25 MG tablet Take 0.5 tablets (12.5 mg) by mouth daily    Associated Diagnoses: Acute systolic heart failure (H)      metoprolol succinate ER (TOPROL XL)  25 MG 24 hr tablet Take 0.5 tablets (12.5 mg) by mouth daily    Associated Diagnoses: Acute systolic heart failure (H)           CONTINUE these medications which have NOT CHANGED    Details   acetaminophen (TYLENOL) 325 MG tablet Take 2 tablets (650 mg) by mouth every 6 hours as needed      apixaban ANTICOAGULANT (ELIQUIS) 5 MG tablet Take 1 tablet (5 mg) by mouth 2 times daily    Associated Diagnoses: ST elevation myocardial infarction involving left anterior descending (LAD) coronary artery (H); Acute systolic heart failure (H)      atorvastatin (LIPITOR) 80 MG tablet Take 1 tablet (80 mg) by mouth daily  Qty: 90 tablet, Refills: 3    Associated Diagnoses: ST elevation myocardial infarction involving left anterior descending (LAD) coronary artery (H)      cetirizine (ZYRTEC) 10 MG tablet Take 10 mg by mouth daily      fish oil-omega-3 fatty acids 1000 MG capsule Take 1 g by mouth 2 times daily Also contains another supplement      melatonin 10 MG TABS tablet Take 1 tablet (10 mg) by mouth at bedtime    Associated Diagnoses: Insomnia, unspecified type      multivitamin w/minerals (THERA-VIT-M) tablet Take 1 tablet by mouth daily      ticagrelor (BRILINTA) 90 MG tablet Take 1 tablet (90 mg) by mouth every 12 hours    Associated Diagnoses: ST elevation myocardial infarction involving left anterior descending (LAD) coronary artery (H)      vitamin C (ASCORBIC ACID) 1000 MG TABS Take 1,000 mg by mouth daily      amiodarone (PACERONE) 200 MG tablet Take 1 tablet (200 mg) by mouth daily    Associated Diagnoses: Cardiac arrest (H)           STOP taking these medications       clonazePAM (KLONOPIN) 0.5 MG tablet Comments:   Reason for Stopping:               Discharge Instructions:        Primary Care - Care Coordination Referral      Reason for your hospital stay    Congestive heart failure and pneumonia     Follow-up and recommended labs and tests     Follow up with your PCP within 1 week if possible; please call for an  appointment if you do not already have one. You will need to have labs at your follow up PCP appointment (called BMP and magnesium level) given that we have started you on lasix. Please also ask your PCP to refer you for an outpatient sleep study. You will also need to have a repeat xray of your chest in 4-6 weeks and this can be ordered by your PCP. We have sent a referral for you to have a heart monitor at home and the office that deals with this will call you this week with a time to get this set up. Follow up with cardiology and cardiac rehab.     Activity    Your activity upon discharge: activity as tolerated     Discharge Instructions    Please ensure that all medications are taken as prescribed. It is very important that you follow the salt and fluid restrictions as outlined in your paperwork. Please check your weight at the same time every day and bring these readings to your follow up cardiology appointment. If you develop intractable pain / vomiting, shortness of breath, fever or if you have any other concerning symptoms, please seek medical attention.     Adult Cardiac Event Monitor     Diet    Follow this diet upon discharge: Cardiac, low fat, no more than 2000 milligrams of sodium/salt per day, fluid restriction of no more than 1.2 liters (40 ounces) per day        Dispo:   -Home    Recommended PCP Follow Up:   -Needs BMP and mg level at follow up  -Needs sleep study  -Needs CXR in 4-6 weeks      Signed:     Adeline Drew MD  11/12/2023 11:42 AM      40 minutes was spent evaluating the patient, writing orders, discharge planning, and providing patient discharge education and counseling in preparation for discharge.

## 2023-11-12 NOTE — PLAN OF CARE
Occupational Therapy Discharge Summary    Reason for therapy discharge:    Discharged to home.    Progress towards therapy goal(s). See goals on Care Plan in [facility] electronic health record for goal details.  Goals met    Therapy recommendation(s):    No further therapy is recommended.    Goal Outcome Evaluation:

## 2023-11-13 ENCOUNTER — APPOINTMENT (OUTPATIENT)
Dept: OCCUPATIONAL THERAPY | Facility: CLINIC | Age: 73
DRG: 291 | End: 2023-11-13
Payer: MEDICARE

## 2023-11-13 ENCOUNTER — PATIENT OUTREACH (OUTPATIENT)
Dept: CARE COORDINATION | Facility: CLINIC | Age: 73
End: 2023-11-13

## 2023-11-13 ENCOUNTER — APPOINTMENT (OUTPATIENT)
Dept: GENERAL RADIOLOGY | Facility: CLINIC | Age: 73
DRG: 291 | End: 2023-11-13
Payer: MEDICARE

## 2023-11-13 ENCOUNTER — APPOINTMENT (OUTPATIENT)
Dept: SPEECH THERAPY | Facility: CLINIC | Age: 73
DRG: 291 | End: 2023-11-13
Payer: MEDICARE

## 2023-11-13 PROBLEM — I50.23 ACUTE ON CHRONIC SYSTOLIC CONGESTIVE HEART FAILURE (H): Status: ACTIVE | Noted: 2023-11-13

## 2023-11-13 PROBLEM — I50.9 CONGESTIVE HEART FAILURE, UNSPECIFIED HF CHRONICITY, UNSPECIFIED HEART FAILURE TYPE (H): Status: ACTIVE | Noted: 2023-11-13

## 2023-11-13 PROBLEM — F41.9 ANXIETY: Status: ACTIVE | Noted: 2023-11-13

## 2023-11-13 PROBLEM — I21.02 ST ELEVATION MYOCARDIAL INFARCTION INVOLVING LEFT ANTERIOR DESCENDING (LAD) CORONARY ARTERY (H): Status: ACTIVE | Noted: 2023-11-13

## 2023-11-13 LAB
ALBUMIN SERPL BCG-MCNC: 3 G/DL (ref 3.5–5.2)
ALP SERPL-CCNC: 99 U/L (ref 40–129)
ALT SERPL W P-5'-P-CCNC: 56 U/L (ref 0–70)
ANION GAP SERPL CALCULATED.3IONS-SCNC: 9 MMOL/L (ref 7–15)
AST SERPL W P-5'-P-CCNC: 46 U/L (ref 0–45)
BASOPHILS # BLD AUTO: 0 10E3/UL (ref 0–0.2)
BASOPHILS NFR BLD AUTO: 0 %
BILIRUB SERPL-MCNC: 0.3 MG/DL
BUN SERPL-MCNC: 21.2 MG/DL (ref 8–23)
CALCIUM SERPL-MCNC: 8.5 MG/DL (ref 8.8–10.2)
CHLORIDE SERPL-SCNC: 106 MMOL/L (ref 98–107)
CREAT SERPL-MCNC: 1.19 MG/DL (ref 0.67–1.17)
DEPRECATED HCO3 PLAS-SCNC: 25 MMOL/L (ref 22–29)
EGFRCR SERPLBLD CKD-EPI 2021: 64 ML/MIN/1.73M2
EOSINOPHIL # BLD AUTO: 0 10E3/UL (ref 0–0.7)
EOSINOPHIL NFR BLD AUTO: 0 %
ERYTHROCYTE [DISTWIDTH] IN BLOOD BY AUTOMATED COUNT: 13.2 % (ref 10–15)
FLUAV RNA SPEC QL NAA+PROBE: NEGATIVE
FLUBV RNA RESP QL NAA+PROBE: NEGATIVE
GLUCOSE SERPL-MCNC: 96 MG/DL (ref 70–99)
HCT VFR BLD AUTO: 29.2 % (ref 40–53)
HGB BLD-MCNC: 9.6 G/DL (ref 13.3–17.7)
HOLD SPECIMEN: NORMAL
IMM GRANULOCYTES # BLD: 0 10E3/UL
IMM GRANULOCYTES NFR BLD: 0 %
LYMPHOCYTES # BLD AUTO: 0.8 10E3/UL (ref 0.8–5.3)
LYMPHOCYTES NFR BLD AUTO: 8 %
MCH RBC QN AUTO: 32.1 PG (ref 26.5–33)
MCHC RBC AUTO-ENTMCNC: 32.9 G/DL (ref 31.5–36.5)
MCV RBC AUTO: 98 FL (ref 78–100)
MONOCYTES # BLD AUTO: 0.4 10E3/UL (ref 0–1.3)
MONOCYTES NFR BLD AUTO: 4 %
NEUTROPHILS # BLD AUTO: 9.3 10E3/UL (ref 1.6–8.3)
NEUTROPHILS NFR BLD AUTO: 88 %
NRBC # BLD AUTO: 0 10E3/UL
NRBC BLD AUTO-RTO: 0 /100
NT-PROBNP SERPL-MCNC: ABNORMAL PG/ML (ref 0–900)
PLATELET # BLD AUTO: 398 10E3/UL (ref 150–450)
POTASSIUM SERPL-SCNC: 3.7 MMOL/L (ref 3.4–5.3)
PROT SERPL-MCNC: 6.1 G/DL (ref 6.4–8.3)
RBC # BLD AUTO: 2.99 10E6/UL (ref 4.4–5.9)
RSV RNA SPEC NAA+PROBE: NEGATIVE
SARS-COV-2 RNA RESP QL NAA+PROBE: NEGATIVE
SODIUM SERPL-SCNC: 140 MMOL/L (ref 135–145)
TROPONIN T SERPL HS-MCNC: 285 NG/L
TROPONIN T SERPL HS-MCNC: 330 NG/L
TROPONIN T SERPL HS-MCNC: 358 NG/L
WBC # BLD AUTO: 10.6 10E3/UL (ref 4–11)

## 2023-11-13 PROCEDURE — 96375 TX/PRO/DX INJ NEW DRUG ADDON: CPT

## 2023-11-13 PROCEDURE — 250N000013 HC RX MED GY IP 250 OP 250 PS 637: Performed by: INTERNAL MEDICINE

## 2023-11-13 PROCEDURE — 120N000001 HC R&B MED SURG/OB

## 2023-11-13 PROCEDURE — 84484 ASSAY OF TROPONIN QUANT: CPT

## 2023-11-13 PROCEDURE — 250N000013 HC RX MED GY IP 250 OP 250 PS 637

## 2023-11-13 PROCEDURE — 80053 COMPREHEN METABOLIC PANEL: CPT | Performed by: INTERNAL MEDICINE

## 2023-11-13 PROCEDURE — 92611 MOTION FLUOROSCOPY/SWALLOW: CPT | Mod: GN | Performed by: SPEECH-LANGUAGE PATHOLOGIST

## 2023-11-13 PROCEDURE — 36415 COLL VENOUS BLD VENIPUNCTURE: CPT | Performed by: EMERGENCY MEDICINE

## 2023-11-13 PROCEDURE — 83880 ASSAY OF NATRIURETIC PEPTIDE: CPT | Performed by: INTERNAL MEDICINE

## 2023-11-13 PROCEDURE — 36415 COLL VENOUS BLD VENIPUNCTURE: CPT | Performed by: INTERNAL MEDICINE

## 2023-11-13 PROCEDURE — 99232 SBSQ HOSP IP/OBS MODERATE 35: CPT

## 2023-11-13 PROCEDURE — 74230 X-RAY XM SWLNG FUNCJ C+: CPT

## 2023-11-13 PROCEDURE — 36415 COLL VENOUS BLD VENIPUNCTURE: CPT

## 2023-11-13 PROCEDURE — G0378 HOSPITAL OBSERVATION PER HR: HCPCS

## 2023-11-13 PROCEDURE — 250N000011 HC RX IP 250 OP 636: Mod: JZ

## 2023-11-13 PROCEDURE — 84484 ASSAY OF TROPONIN QUANT: CPT | Performed by: EMERGENCY MEDICINE

## 2023-11-13 PROCEDURE — 85025 COMPLETE CBC W/AUTO DIFF WBC: CPT | Performed by: INTERNAL MEDICINE

## 2023-11-13 PROCEDURE — 97530 THERAPEUTIC ACTIVITIES: CPT | Mod: GO

## 2023-11-13 PROCEDURE — 84484 ASSAY OF TROPONIN QUANT: CPT | Performed by: STUDENT IN AN ORGANIZED HEALTH CARE EDUCATION/TRAINING PROGRAM

## 2023-11-13 PROCEDURE — 96376 TX/PRO/DX INJ SAME DRUG ADON: CPT

## 2023-11-13 PROCEDURE — 97165 OT EVAL LOW COMPLEX 30 MIN: CPT | Mod: GO

## 2023-11-13 PROCEDURE — 250N000011 HC RX IP 250 OP 636: Performed by: EMERGENCY MEDICINE

## 2023-11-13 RX ORDER — FUROSEMIDE 10 MG/ML
20 INJECTION INTRAMUSCULAR; INTRAVENOUS ONCE
Status: COMPLETED | OUTPATIENT
Start: 2023-11-13 | End: 2023-11-13

## 2023-11-13 RX ORDER — ONDANSETRON 2 MG/ML
4 INJECTION INTRAMUSCULAR; INTRAVENOUS EVERY 6 HOURS PRN
Status: DISCONTINUED | OUTPATIENT
Start: 2023-11-13 | End: 2023-11-15 | Stop reason: HOSPADM

## 2023-11-13 RX ORDER — AMIODARONE HYDROCHLORIDE 200 MG/1
200 TABLET ORAL DAILY
Status: DISCONTINUED | OUTPATIENT
Start: 2023-11-13 | End: 2023-11-15 | Stop reason: HOSPADM

## 2023-11-13 RX ORDER — ACETAMINOPHEN 325 MG/1
650 TABLET ORAL EVERY 6 HOURS PRN
Status: DISCONTINUED | OUTPATIENT
Start: 2023-11-13 | End: 2023-11-15 | Stop reason: HOSPADM

## 2023-11-13 RX ORDER — FUROSEMIDE 40 MG
40 TABLET ORAL DAILY
Status: DISCONTINUED | OUTPATIENT
Start: 2023-11-14 | End: 2023-11-15 | Stop reason: HOSPADM

## 2023-11-13 RX ORDER — ONDANSETRON 4 MG/1
4 TABLET, ORALLY DISINTEGRATING ORAL EVERY 6 HOURS PRN
Status: DISCONTINUED | OUTPATIENT
Start: 2023-11-13 | End: 2023-11-15 | Stop reason: HOSPADM

## 2023-11-13 RX ORDER — MULTIPLE VITAMINS W/ MINERALS TAB 9MG-400MCG
1 TAB ORAL DAILY
Status: DISCONTINUED | OUTPATIENT
Start: 2023-11-13 | End: 2023-11-15 | Stop reason: HOSPADM

## 2023-11-13 RX ORDER — CETIRIZINE HYDROCHLORIDE 10 MG/1
10 TABLET ORAL DAILY
Status: DISCONTINUED | OUTPATIENT
Start: 2023-11-13 | End: 2023-11-15 | Stop reason: HOSPADM

## 2023-11-13 RX ORDER — ALBUTEROL SULFATE 0.83 MG/ML
2.5 SOLUTION RESPIRATORY (INHALATION) EVERY 4 HOURS PRN
Status: DISCONTINUED | OUTPATIENT
Start: 2023-11-13 | End: 2023-11-15 | Stop reason: HOSPADM

## 2023-11-13 RX ORDER — BARIUM SULFATE 400 MG/ML
SUSPENSION ORAL ONCE
Status: COMPLETED | OUTPATIENT
Start: 2023-11-13 | End: 2023-11-13

## 2023-11-13 RX ORDER — FUROSEMIDE 10 MG/ML
60 INJECTION INTRAMUSCULAR; INTRAVENOUS EVERY 12 HOURS
Status: COMPLETED | OUTPATIENT
Start: 2023-11-13 | End: 2023-11-13

## 2023-11-13 RX ORDER — FUROSEMIDE 10 MG/ML
80 INJECTION INTRAMUSCULAR; INTRAVENOUS EVERY 8 HOURS
Status: DISCONTINUED | OUTPATIENT
Start: 2023-11-13 | End: 2023-11-13

## 2023-11-13 RX ORDER — HYDROXYZINE HYDROCHLORIDE 10 MG/1
10 TABLET, FILM COATED ORAL EVERY 8 HOURS PRN
Status: DISCONTINUED | OUTPATIENT
Start: 2023-11-13 | End: 2023-11-15 | Stop reason: HOSPADM

## 2023-11-13 RX ORDER — LORAZEPAM 2 MG/ML
1 INJECTION INTRAMUSCULAR ONCE
Status: COMPLETED | OUTPATIENT
Start: 2023-11-13 | End: 2023-11-13

## 2023-11-13 RX ORDER — ESCITALOPRAM OXALATE 10 MG/1
10 TABLET ORAL DAILY
Status: DISCONTINUED | OUTPATIENT
Start: 2023-11-13 | End: 2023-11-15 | Stop reason: HOSPADM

## 2023-11-13 RX ORDER — ATORVASTATIN CALCIUM 80 MG/1
80 TABLET, FILM COATED ORAL DAILY
Status: DISCONTINUED | OUTPATIENT
Start: 2023-11-13 | End: 2023-11-15 | Stop reason: HOSPADM

## 2023-11-13 RX ORDER — FUROSEMIDE 10 MG/ML
60 INJECTION INTRAMUSCULAR; INTRAVENOUS EVERY 12 HOURS
Status: DISCONTINUED | OUTPATIENT
Start: 2023-11-13 | End: 2023-11-13

## 2023-11-13 RX ORDER — ASCORBIC ACID 500 MG
1000 TABLET ORAL DAILY
Status: DISCONTINUED | OUTPATIENT
Start: 2023-11-13 | End: 2023-11-15 | Stop reason: HOSPADM

## 2023-11-13 RX ADMIN — FUROSEMIDE 60 MG: 10 INJECTION, SOLUTION INTRAMUSCULAR; INTRAVENOUS at 15:39

## 2023-11-13 RX ADMIN — LOSARTAN POTASSIUM 12.5 MG: 25 TABLET, FILM COATED ORAL at 10:05

## 2023-11-13 RX ADMIN — AMIODARONE HYDROCHLORIDE 200 MG: 200 TABLET ORAL at 09:57

## 2023-11-13 RX ADMIN — APIXABAN 5 MG: 5 TABLET, FILM COATED ORAL at 09:36

## 2023-11-13 RX ADMIN — ATORVASTATIN CALCIUM 80 MG: 80 TABLET, FILM COATED ORAL at 20:57

## 2023-11-13 RX ADMIN — BARIUM SULFATE 50 ML: 400 SUSPENSION ORAL at 12:40

## 2023-11-13 RX ADMIN — MULTIPLE VITAMINS W/ MINERALS TAB 1 TABLET: TAB at 09:36

## 2023-11-13 RX ADMIN — TICAGRELOR 90 MG: 90 TABLET ORAL at 10:05

## 2023-11-13 RX ADMIN — CETIRIZINE HYDROCHLORIDE 10 MG: 10 TABLET, FILM COATED ORAL at 09:36

## 2023-11-13 RX ADMIN — AMOXICILLIN AND CLAVULANATE POTASSIUM 1 TABLET: 875; 125 TABLET, COATED ORAL at 09:36

## 2023-11-13 RX ADMIN — ESCITALOPRAM OXALATE 10 MG: 10 TABLET ORAL at 18:05

## 2023-11-13 RX ADMIN — OXYCODONE HYDROCHLORIDE AND ACETAMINOPHEN 1000 MG: 500 TABLET ORAL at 09:36

## 2023-11-13 RX ADMIN — FUROSEMIDE 60 MG: 10 INJECTION, SOLUTION INTRAMUSCULAR; INTRAVENOUS at 09:40

## 2023-11-13 RX ADMIN — TICAGRELOR 90 MG: 90 TABLET ORAL at 20:46

## 2023-11-13 RX ADMIN — FUROSEMIDE 20 MG: 10 INJECTION, SOLUTION INTRAMUSCULAR; INTRAVENOUS at 02:30

## 2023-11-13 RX ADMIN — METOPROLOL SUCCINATE 12.5 MG: 25 TABLET, EXTENDED RELEASE ORAL at 09:36

## 2023-11-13 RX ADMIN — AMOXICILLIN AND CLAVULANATE POTASSIUM 1 TABLET: 875; 125 TABLET, COATED ORAL at 17:59

## 2023-11-13 RX ADMIN — APIXABAN 5 MG: 5 TABLET, FILM COATED ORAL at 17:59

## 2023-11-13 RX ADMIN — LORAZEPAM 1 MG: 2 INJECTION INTRAMUSCULAR; INTRAVENOUS at 02:28

## 2023-11-13 ASSESSMENT — ACTIVITIES OF DAILY LIVING (ADL)
ADLS_ACUITY_SCORE: 22
DEPENDENT_IADLS:: INDEPENDENT
PREVIOUS_RESPONSIBILITIES: DRIVING;MEAL PREP
DEPENDENT_IADLS:: INDEPENDENT
ADLS_ACUITY_SCORE: 22
ADLS_ACUITY_SCORE: 35
ADLS_ACUITY_SCORE: 35
ADLS_ACUITY_SCORE: 20
ADLS_ACUITY_SCORE: 22

## 2023-11-13 NOTE — PROGRESS NOTES
[facility] Hospital Medicine Progress Note  Date of Service: 11/13/2023    Assessment & Plan           A 74 yo male with a history of HTN, HLD and multiple recent admissions: 10/26-11/3 with VF/VT cardiac arrest X2, anterior STEMI s/p PCI to LAD; readmission 11/6-11/12/23 with systolic CHF and respiratory failure 2/2 aspiration pneumonia. Now presents later on 11/12 for ongoing dyspnea.     Acute hypoxic respiratory failure  Multifactorial. Ongoing issue since MI Oct 26, 2023. EF is newly reduced following MI which is probably contributing. Long time smoker so probably a component of COPD although not previously diagnosed. TABATHA suspected based on nocturnal hypoxemia noted during previous admission. Also recently treated for aspiration pneumonia (currently on Augmentin) and probably has ongoing aspiration / related pneumonitis based on imaging.   Notably, CXR does look worse on presentation 11/12 compared to previous: increasing bilateral groundglass density infiltrates more apparent on right, likely representing infectious or inflammatory process. Increasing venous hypertension. Small bilateral pleural effusions, more apparent on left.   -Put back on 2L O2 NC on admission (not on O2 at home), but weaned off AM 11/13/23  -OT consult for eval for need for home O2   -Management of contributing factors, below    Heart failure with reduced ejection fraction  Apical wall akinesis  BNP elevated from previous (02810 on 11/6, now 80604 on 11/12). CXR shows increasing venous hypertension and bilateral pleural effusions. Mild JVD but no edema. Near baseline weight (148lbs).  Echo from 10/30/23 showed EF 20-30% and apical wall akinesis; no LV thrombus. Per cardiology at [facility], he willl need anticoagulation for 3 months. Managed PTA with furosemide 20mg daily, metoprolol, losartan.   -Furosemide 60mg [id number]  -Continue PTA metoprolol, losartan with hold parameters  -Would probably benefit from  increased dose home furosemide on discharge (something around 40mg daily compared to current 20mg daily).     Recent pneumonia  Possible aspiration pneumonitis  Patient was suspected to have aspiration pneumonia during his Oct 2023 admission and completed a course of ceftriaxone. During admission 11/6-11/12 he was treated with Ampicillin-sulbactam, then discharged on Augmentin. Urine legionella, strep pneumo ag neg, MRSA swab neg 11/7/23. Bedside swallow without signs of aspiration, cannot find formal swallow eval. Covid, RSV, influenza A&B negative on admission 11/12/23.   CXR 11/12 shows worsening groundglass infiltrates concerning for inflammatory process (infectious less likely as WBC is improving, no other signs of infection & infectious workup is unremarkable).   -Continue Augmentin  -Speech eval for swallow     Mild DOMENICA  Baseline creatinine around 0.95, creatinine on presentation 1.31. Improved with diuresis in ER suggesting elevation was due to vascular congestion likely related to CHF.   -Continue diuresis as per CHF, above  -BMP in AM if remains in hospital     CAD, recent anterior STEMI s/p PCI/BRENDA to LAD (10/26/23)  Recent VT/VF cardiac arrest (10/27/23), NSV   Continue brilinta (plan for 90D of brilinta, then plavix thereafter to complete the 1 year given cost of brilinta), metoprolol, statin. Hospital course for MI complicated by VT/VF arrest, but completed amiodarone load for this prior to discharge & now in sinus rhythm. The patient will f/u with cardiology at discharge with plan for cardiac MRI in 1 month. The patient was referred for cardiac rehab during this recent hospitalization.  There was concern regarding possible a-flutter during hospitalization so arranged for 30 day event monitor. Auscultated to be sinus rhythm on admission 11/13.  -Continue PTA amiodarone  -Continue PTA metoprolol  -Continue PTA Brilinta    Elevated troponin  Overall downtrending from MI 10/26/23, but now plateaued; since  admission 358 <-- 330 <-- 317.   Suspect plateau elated to cardiac stress from increasing venous congestion & volume overload. No chest pain or other symptoms of ACS.   -Management of CHF, above    Suspected COPD  Nicotine dependence  Probably has a component of COPD given long time smoking history, but not previously diagnosed. Lungs with mild expiratory wheezing AM 11/13. Declines nicotine replacement. Strongly encouraged tobacco cessation.   -Albuterol available Q4hrs PRN     Anxiety  Panic disorder   Possible OCD  Insomnia  Psych consulted during [facility] admission 10/29 for increased anxiety after MI, recommended Klonopin; this has since been discontinued since patient did not like how it made him feel. Currently on prn trazodone and atarax.   Making comments 11/13 about 'enemies everywhere' and appears anxious.   -Continue trazodone & atarax    Moderate cognitive impairment  SLUMS 20/30 during recent previous hospitalization. Patient is a generally poor historian.     Intermittent hypotension  Asymptomatic, improved since metoprolol dose reduced from 25mg to 12.5mg daily during last hospitalization.     Suspected TABATHA  Patient was noted to have nocturnal hypoxia at [facility] during his recent hospitalization and o/p sleep study was recommended.     Chest wall pain 2/2 recent CPR for VT/VF cardiac arrest  Pain is only with palpation of sternum & is improving. Does not sound like cardiac pain (no radiation, isolated to chest wall exacerbated with palpation).      Chronic anemia  Chronic, normocytic; baseline hemoglobin around 10.5, since admission 9.6 <-- 11.5. No signs of acute brisk bleeding.   -CBC in AM if remains in hospital overnight    Diet: Consistent Carbohydrate Diet Moderate Consistent Carb (60 g CHO per Meal) Diet  Fluid restriction 2000 ML FLUID (and additional linked orders)    DVT Prophylaxis: DOAC  Michael Catheter: Not present  Lines: None     Code Status: Full Code       Discussion: denies complaints  today and was weaned off O2 this AM. Lungs continue to sound a bit coarse. Is near baseline weight.     Disposition: Anticipate discharge likely this afternoon or tomorrow morning if able to continue weaning off oxygen.     Attestation:  I have reviewed today's vital signs, notes, medications, labs and imaging.    I have discussed, or will be discussing, the patient with hospitalist physician, Dr. Bearden.     Verónica Leija PA-C     Interval History   Multiple recent hospital admissions:   -Admitted 10/26-11/3/23 to [facility] for STEMI now s/p PCI to LAD 10/26. Course complicated by recurrent VT & cardiac arrest x2 on 10/27.   -Discharged to [facility] 11/3  -Admitted to [facility] 11/6-11/12/23  after presenting following leaving [facility] AMA because he felt he was not receiving the care he needed. Treated for pneumonia & acute exacerbation of CHF, then discharged 11/12 on course of Augmentin & lasix 20mg daily.     Patient is a poor historian and cannot recall why he returned to the hospital after discharge. Says he felt like he 'lost control' and 'was not in charge of decisions'. Upon further questioning he endorses feeling a little lightheaded in ER.     AM 11/13 patient initially has no complaints. Denies chest pain aside from some chest wall pain when he pushes hard on his sternum which he says is improving. No deeper chest pain or pain radiating to neck, back, or shoulders. Denies fever, chills, abdominal pain, nausea, vomiting. Denies LE edema. Feels his fingers are stiff but no other discomfort.   Initially feels breathing is ok, but then begins hyperventilating & noting the wheezing he feels with expending his expiratory reserve volume.   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 Reassured patient that we are here to help him.    Upon arrival to ER patient received lab and imaging workup. Received 20mg IV  furosemide, 1mg IV lorazepam, and 1.5mg midazolam.     Physical Exam   Temp:  [97.3  F (36.3  C)-98  F (36.7  C)] 98  F (36.7  C)  Pulse:  [64-92] 75  Resp:  [18-50] 27  BP: (100-160)/() 117/69  SpO2:  [90 %-100 %] 97 %    Weights:   Vitals:    11/13/23 0400   Weight: 67.4 kg (148 lb 9.4 oz)    Body mass index is 23.27 kg/m .    Constitutional: alert, laying in bed, appears comfortable, nontoxic  CV: regular rate & rhythm, no murmurs, rubs, or gallops. Radial & dorsalis pedis pulses 2+ bilaterally. No pitting LE edema. JVP is mildly elevated.   Respiratory: turned O2 off during interview & patient remained >90% SPO2. Respirations unlabored. Course lung sounds in peripheral lung fields. Mild expiratory wheezing with deep breaths. No rhonchi.   GI: Bowel sounds present throughout. Abdomen soft, nontender to palpation, non-distended.   Skin: Warm and dry  Musculoskeletal: muscle bulk as expected, no obvious joint deformities.   Neuro: distal sensation intact to lower extremities bilaterally, no tremor.     Data   Recent Labs   Lab 11/13/23  0505 11/12/23  2245 11/12/23  0436 11/11/23  0500 11/10/23  0502 11/08/23  0642 11/07/23  0606   WBC 10.6 15.5*  --   --  8.1   < > 12.3*   HGB 9.6* 11.5*  --   --  10.0*   < > 10.5*   MCV 98 97  --   --  97   < > 97    511*  --   --  370   < > 368   INR  --   --   --   --   --   --  1.39*    140 141   < > 140   < > 138   POTASSIUM 3.7 3.8 3.7   < > 3.8   < > 4.0   CHLORIDE 106 103 106   < > 105   < > 106   CO2 25 24 24   < > 27   < > 26   BUN 21.2 19.3 21.8   < > 21.0   < > 14.3   CR 1.19* 1.31* 1.14   < > 1.21*   < > 0.88   ANIONGAP 9 13 11   < > 8   < > 6*   PRANAV 8.5* 9.1 8.4*   < > 8.1*   < > 8.5*   GLC 96 147* 101*   < > 93   < > 102*   ALBUMIN 3.0* 3.7  --   --   --   --   --    PROTTOTAL 6.1* 7.4  --   --   --   --   --    BILITOTAL 0.3 0.4  --   --   --   --   --    ALKPHOS 99 121  --   --   --   --   --    ALT 56 69  --   --   --   --   --    AST 46* 53*   --   --   --   --   --     < > = values in this interval not displayed.     Imaging  Recent Results (from the past 24 hour(s))   XR Chest Port 1 View    Narrative    EXAM: XR CHEST PORT 1 VIEW  LOCATION: [facility]  DATE: 11/12/2023    INDICATION: Shortness of breath.  COMPARISON: Portable chest single view 11/09/2023 at 0853 hours.      Impression    IMPRESSION: Increasing bilateral groundglass density infiltrates, more apparent on the right, likely representing an infectious or an inflammatory process. Differential diagnostic possibilities would include COVID-19 infection. Cardiac size approaches   upper limits of normal. Increasing venous hypertension. Small bilateral pleural effusions, more apparent on the left. Mildly atherosclerotic thoracic aorta. Degenerative changes both shoulders and the spine. Monitoring leads have been placed overlying   the chest.      I reviewed all new labs and imaging results over the last 24 hours.    Medications    - MEDICATION INSTRUCTIONS -        amiodarone  200 mg Oral Daily    amoxicillin-clavulanate  1 tablet Oral BID    apixaban ANTICOAGULANT  5 mg Oral BID    atorvastatin  80 mg Oral Daily    cetirizine  10 mg Oral Daily    furosemide  80 mg Intravenous Q8H    losartan  12.5 mg Oral Daily    metoprolol succinate ER  12.5 mg Oral Daily    multivitamin w/minerals  1 tablet Oral Daily    ticagrelor  90 mg Oral Q12H    vitamin C  1,000 mg Oral Daily     Verónica Leija PA-C

## 2023-11-13 NOTE — H&P
"CC : Shortness of breath     HPI : Duane C Johnson is an 73 year old male who was just released from the hospital after admission for treatment of congestive heart failure exacerbation.  Patient stated that it was some systemic with his medications, he became very short of breath to come back to emergency room for evaluation.  Patient recently had a stimulation myocardial infarction complicated by V-fib arrest.  Patient undergo PCI to LAD.  He also developed aspiration pneumonia.  Patient has a history of COPD.  Echocardiogram done on October 30 this year showed ejection fraction of 20-30% with apical wall akinesis.  No thrombus identified.    Past Medical History:   Diagnosis Date    Benign essential hypertension     CAD (coronary artery disease)     Chronic systolic heart failure (H)     Hypertension     ST elevation myocardial infarction (STEMI), unspecified artery (H)     Ventricular fibrillation (H)        Allergies:   Allergies   Allergen Reactions    Clonazepam Other (See Comments)     Per spouse, acted like a zombie and he was shaky, could barely talk.       Principal Problem:    Anxiety  Active Problems:    Congestive heart failure, unspecified HF chronicity, unspecified heart failure type (H)    Blood pressure 117/69, pulse 75, temperature 98  F (36.7  C), temperature source Oral, resp. rate 27, height 1.702 m (5' 7.01\"), weight 67.4 kg (148 lb 9.4 oz), SpO2 97%.    Review of Systems  ROS:    1.General: no fever, chills, not in distress  2.HEENT: no HA, no blurry vision, no swallow problems, no nasal congestion, no sore throat  3.Pulmonary: no cough, SOB, wheezes  4.CVS: See above  5.GI: no nausea, vomiting or diarrhea, no abdominal pain, no constipation, no hematemesis or hematochezia  6.: no renal colic, no hematuria, urinary frequency or urgency  7.Extremities: no edema  8.Neurological: no dizziness, vertigo, double or blurry vision, no no focal weakness, no paresthesia, no swallow or speech " problems  9.Musculosceletal: no joint pains, no joint swelling, no back pain  10.Dermatological: no skin rashes, no lesions, no pruritus  11.Hematological: no bleeding, no hx/o clots  12.Endocrinological: no heat/cold intolerance, no hx/o diabetes  13.Psychiatric: no suicidal or homicidal thoughts  Physical Exam  Physical Exam:    Not in distress, pleasant, lucid, cooperative,  Head - atraumatic, eyes - pupils equal, round, reactive to light, extra ocular movement intact, MMM  Neck - supple, thyroid not enlarged, LN not palpated  Lungs - clear to auscultation, no dullness on percussion  CVS - heart sounds S1, S2, no additional murmurs gallop, regular rate and rhythm  Gastrointestinal-abdomen is soft, non-tender, non-distended, no organomegaly, positive bowel sounds  Extremities no clubbing, cyanosis or edema  Neurological-cranial nerve II-XII grossly intact, no meningeal signs, no cerebellar signs, no sensory deficit  Musculoskeletal - joints, no effusions, ROM preserved  Dermatological - the skin dry, warm, no rashes  Psychiatric-patient is AAO X3, patient has normal affect    Lab work results and results of ancillary studies reviewed independently by me    Assessment:  Shortness of breath  CHF exacerbation  Heart failure with reduced ejection fraction (EF 20 to 30%)  CAD  Status post PCI to LAD  Recent ST elevation myocardial infarction  History of V. Tach  Anxiety  COPD    Plan:  Monitor on medical floor  Strict I's and O's and daily weight  Home regiment including Lasix and beta-blockers were started  Currently pain-free  Wean off oxygen  Hopefully can be released (again) in the next 24 hours    END:    As the provider for the telehealth service, I attest that I introduced myself to the patient, provided my credentials, disclosed by location and determined that based on a review of the patient's chart and discussion with members of the patient's treatment team, telemedicine via real-time, 2 way, and interactive  audio and video platform is an appropriate and effective means of providing the service.  The patient and I mutually agree this visit is appropriate for telemedicine.  The virtual encounter was taken place from  [place], [place].  The encounter took approximately 35 minutes.  The nurse was present during the entire time and I was able to move the stethoscope in appropriate directions.  The patient was evaluated at the Hospital         Portions of this note may be dictated using Dragon voice recognition software. Variances in spelling and vocabulary are possible and unintentional. Not all errors may be caught and/or corrected. Please notify the author if any discrepancies are noted and/or if the meaning of any statement is unclear.          Patient verbally consented for treatment via video visit with patient currently located at [facility] and provider located in [place].         Time Spent: 40 minutes     Bert Marinelli MD  11/13/2023

## 2023-11-13 NOTE — PROGRESS NOTES
Clinic Care Coordination Contact  Ambulatory Care Coordination to Inpatient Care Management   Hand-In Communication    Date:  November 13, 2023  Name: Duane C Johnson is enrolled in Ambulatory Care Coordination program and I am the Lead Care Coordinator.  CC Contact Information: Epic InBasket + phone  Payor Source: Payor: MEDICARE / Plan: MEDICARE / Product Type: Medicare /   Current services in place: None   Please see the CC Snaphot and Care Management Flowsheets for specific  details of this Duane C Johnson care plan.   Additional details/specific concerns r/t this admission:   Patient was hospitalized at [facility] from 10/26/23 to 11/3/23 with diagnosis of heart attack, cardiac arrest.      Assessment: Patient has transitioned to TCU for short term rehabilitation:     Facility Name: [facility]   Patient is new to clinic care coordination   No additional information to share     I will follow this admission in Epic. Please feel free to contact me with questions or for further collaboration in discharge planning.     [facility]   Ruthy Paiz RN, Care Coordinator   [facility]'s   E-mail [email or web address]   [phone]

## 2023-11-13 NOTE — PROGRESS NOTES
Impression:  Oral and pharyngeal stage of swallow WFL for regular consistency diet.  Pt noted to have mild pharyngeal dysphagia characterized by reduced laryngeal vestibular closure and mild pharyngeal residue after the swallow.  There was no penetration or aspiration across consistencies noted.  Pt able to effectively clear all pharyngeal residue with a dry swallow.   Recommend continue regular diet consistency.       Video Swallow Study  11/13/23    Appointment Info   Signing Clinician's Name / Credentials (SLP) Sophia Vazquez MA, CCC/SLP   General Information   Onset of Illness/Injury or Date of Surgery 11/13/23   Referring Physician Verónica Leija PA-C   Patient/Family Therapy Goal Statement (SLP) Pt reports no significant dysphagia in regards to goals.   Pertinent History of Current Problem A 74 yo male with a history of HTN, HLD and multiple recent admissions: 10/26-11/3 with VF/VT cardiac arrest X2, anterior STEMI s/p PCI to LAD; readmission 11/6-11/12/23 with systolic CHF and respiratory failure 2/2 aspiration pneumonia. Now presents later on 11/12 for ongoing dyspnea. Referred for ST evaluation due to concern for worsening aspiration pneumonitis   General Observations Pt alert and very talkative.  Feels swallow may be slow sometimes but feels any difficulty is related to his MI.   Pain Assessment   Patient Currently in Pain No   Type of Evaluation   Type of Evaluation Swallow Evaluation   Oral Motor   Oral Musculature generally intact   Structural Abnormalities none present   Mucosal Quality good   Dentition (Oral Motor)   Dentition (Oral Motor) natural dentition;significant number of missing teeth   Facial Symmetry (Oral Motor)   Facial Symmetry (Oral Motor) WNL   Lip Function (Oral Motor)   Lip Range of Motion (Oral Motor) WNL   Tongue Function (Oral Motor)   Tongue ROM (Oral Motor) WNL   Jaw Function (Oral Motor)   Jaw Function (Oral Motor) WNL   Vocal Quality/Secretion Management (Oral Motor)    Vocal Quality (Oral Motor) WNL   General Swallowing Observations   Current Diet/Method of Nutritional Intake (General Swallowing Observations, NIS) regular diet   Past History of Dysphagia No   Swallowing Evaluation Videofluoroscopic swallow study (VFSS)   Clinical Swallow Evaluation   Feeding Assistance no assistance needed   VFSS Evaluation   Radiologist Dr. Tsai   Views Taken left lateral;A/P   Physical Location of Procedure [facility]   VFSS Textures Trialed thin liquids;mildly thick liquids;pureed;solid foods   VFSS Eval: Thin Liquid Texture Trial   Mode of Presentation, Thin Liquid cup;straw;self-fed   Order of Presentation 1,2,3   Preparatory Phase WFL   Oral Phase, Thin Liquid WFL   Bolus Location When Swallow Triggered posterior angle of ramus   Pharyngeal Phase, Thin Liquid WFL;residue in vallecula   Rosenbek's Penetration Aspiration Scale: Thin Liquid Trial Results 1 - no aspiration, contrast does not enter airway   Diagnostic Statement WFL.  Mild post swallow residue cleared by dry swallow.  Mildly incomplee laryngeal vestibular closure.   VFSS Eval: Mildly Thick Liquids   Mode of Presentation self-fed;cup   Order of Presentation 4   Preparatory Phase WFL   Oral Phase WFL   Bolus Location When Swallow Triggered posterior angle of ramus   Pharyngeal Phase WFL;residue in vallecula   Rosenbek's Penetration Aspiration Scale 1 - no aspiration, contrast does not enter airway   Diagnostic Statement WFL. Mild post swallow residue cleared by dry swallow. Mildly incomplee laryngeal vestibular closure.   VFSS Evaluation: Puree Solid Texture Trial   Mode of Presentation, Puree spoon;self-fed   Order of Presentation 5, 7 (AP)   Preparatory Phase WFL   Oral Phase, Puree WFL   Bolus Location When Swallow Triggered posterior angle of ramus   Pharyngeal Phase, Puree WFL   Rosenbek's Penetration Aspiration Scale: Puree Food Trial Results 1 - no aspiration, contrast does not enter airway    Diagnostic Statement WFL, on AP view More contraction on left side.   VFSS Evaluation: Solid Food Texture Trial   Mode of Presentation, Solid self-fed   Order of Presentation 6   Preparatory Phase WFL   Oral Phase, Solid WFL   Bolus Location When Swallow Triggered posterior angle of ramus   Pharyngeal Phase, Solid WFL;residue in vallecula   Rosenbek's Penetration Aspiration Scale: Solid Food Trial Results 1 - no aspiration, contrast does not enter airway   Diagnostic Statement WFL.  No penetration or aspiration.  Pt swallows 2x/bite. Mild mod pharyngeal residue prior to dry swallow to clear.   Esophageal Phase of Swallow   Patient reports or presents with symptoms of esophageal dysphagia No   Esophageal sweep performed during today s vidofluoroscopic exam  Yes   Swallowing Recommendations   Diet Consistency Recommendations regular diet   Clinical Impression   Criteria for Skilled Therapeutic Interventions Met (SLP Eval) Evaluation only   Clinical Impression Comments Oral and pharyngeal stage of swallow WFL for regular consistency diet.  Pt noted to have mild pharyngeal dysphagia characterized by reduced laryngeal vestibular closure and mild pharyngeal residue after the swallow.  There was no penetration or aspiration across consistencies noted.  Pt able to effectively clear all pharyngeal residue with a dry swallow.   Recommend continue regular diet consistency.   SLP Total Evaluation Time   Evaluation, videofluoroscopic eval of swallow function Minutes (07186) 20   Total Session Time   Total Session Time (sum of timed and untimed services) 20

## 2023-11-13 NOTE — ED NOTES
Pt sating 100% on Bipap O2 30%. Writer removed bipap and placed pt on RA. Currently sating at 95% RA. Pt is tolerating well and no respiratory distress noted.

## 2023-11-13 NOTE — PROGRESS NOTES
"Occupational Therapy     11/13/23 1047   Appointment Info   Signing Clinician's Name / Credentials (OT) Fabiana Calvillo, OTR/L   Quick Adds   Quick Adds Certification   Living Environment   People in Home spouse   Current Living Arrangements house   Home Accessibility stairs within home;stairs to enter home   Number of Stairs, Main Entrance 5   Stair Railings, Main Entrance railings safe and in good condition   Number of Stairs, Within Home, Primary greater than 10 stairs   Stair Railings, Within Home, Primary railings safe and in good condition   Self-Care   Fall history within last six months yes   Activity/Exercise/Self-Care Comment Pt reports indep with ADLs, does not use walking device at baseline though does have walker and manual wheelchair at home.   Instrumental Activities of Daily Living (IADL)   Previous Responsibilities driving;meal prep   IADL Comments Pt's wife completes his med set-up, pt administers indep. Pt's wife completes housekeeping tasks.   General Information   Onset of Illness/Injury or Date of Surgery 11/12/23   Referring Physician Bert Marinelli MD   Patient/Family Therapy Goal Statement (OT) Pt would like an \"oxygen concentrator\" to return home with so he does not experience a panic attack again.   Additional Occupational Profile Info/Pertinent History of Current Problem Per HPI: Duane C Johnson is an 73 year old male who was just released from the hospital after admission for treatment of congestive heart failure exacerbation.  Patient stated that it was some systemic with his medications, he became very short of breath to come back to emergency room for evaluation.  Patient recently had a stimulation myocardial infarction complicated by V-fib arrest.  Patient undergo PCI to LAD.  He also developed aspiration pneumonia.  Patient has a history of COPD.  Echocardiogram done on October 30 this year showed ejection fraction of 20-30% with apical wall akinesis.  No thrombus identified. "   Existing Precautions/Restrictions fall;oxygen therapy device and L/min   General Observations and Info Pt currently on 0.5L O2 via nasal cannula, does not use at baseline.   Cognitive Status Examination   Orientation Status orientation to person, place and time   Affect/Mental Status (Cognitive) anxious   Cognitive Status Comments Pt A&O x4, able to follow 1-2 step directions, conversation and communicate needs. Pt perseverating on need for home oxygen throughout OT eval/treatment today.   Pain Assessment   Patient Currently in Pain No   Range of Motion Comprehensive   General Range of Motion no range of motion deficits identified   Strength Comprehensive (MMT)   General Manual Muscle Testing (MMT) Assessment no strength deficits identified   Transfers   Transfers sit-stand transfer   Sit-Stand Transfer   Sit-Stand [place] (Transfers) supervision   Sit/Stand Transfer Comments Pt completes sit>stand from chair SBA, sturdy upon standing, denies dizziness.   Clinical Impression   Criteria for Skilled Therapeutic Interventions Met (OT) Yes, treatment indicated   OT Diagnosis Decreased ADL indep/safety   Influenced by the following impairments CHF, anxiety   OT Problem List-Impairments impacting ADL problems related to;activity tolerance impaired;cognition   Assessment of Occupational Performance 1-3 Performance Deficits   Identified Performance Deficits functional mobility, standing tolerance, cognition   Planned Therapy Interventions (OT) ADL retraining;cognition;home program guidelines;progressive activity/exercise   Clinical Decision Making Complexity (OT) problem focused assessment/low complexity   Risk & Benefits of therapy have been explained evaluation/treatment results reviewed;care plan/treatment goals reviewed;risks/benefits reviewed;current/potential barriers reviewed;participants voiced agreement with care plan;participants included;patient   OT Total Evaluation Time   OT Eval, Low Complexity Minutes  (46094) 8   Therapy Certification   Medical Diagnosis CHF, anxiety   Start of Care Date 11/13/23   Certification date from 11/13/23   Certification date to 11/13/23   OT Goals   Therapy Frequency (OT) One time eval and treatment   OT Predicted Duration/Target Date for Goal Attainment 11/13/23   OT Goals OT Goal 1   OT: Goal 1 Pt will verbalize/demonstrate understanding of CHF home mgmt strategies including daily symptom checks and HEP by discharge.  (Goal Met)   Therapeutic Activities   Therapeutic Activity Minutes (46145) 15   Symptoms noted during/after treatment fatigue   Treatment Detail/Skilled Intervention Education provided on CHF home mgmt strategies including daily symptom checks using stoplight tool and HEP (walking and UB). Pt verbalizes understanding. Pt completes UB calesthenics while standing with demo provided. Education and demo of breathing techniques during exercises with pt demonstrating understanding. Rest breaks provided as needed. Pt oxygen sats remain between 94 and 96% throughout exercises while pt on .5L O2. Pt remains seated in chair with call light and needs in reach, chair alarm activated.   OT Discharge Planning   OT Plan One time eval and treatment   OT Discharge Recommendation (DC Rec) home with home care occupational therapy;home with assist   OT Rationale for DC Rec Pt presenting near baseline level of functioning completing mobility SBA. Pt verbalizes/demonstrates understanding of CHF home mgmt strategies as well as breathing techniques to use when experiencing anxiety or SOB.   OT Brief overview of current status SBA ADLs; completes UB exercises while standing with oxygen 94-96%   Total Session Time   Timed Code Treatment Minutes 15   Total Session Time (sum of timed and untimed services) 23      [facility]  OUTPATIENT OCCUPATIONAL THERAPY  EVALUATION  PLAN OF TREATMENT FOR OUTPATIENT REHABILITATION  (COMPLETE FOR INITIAL CLAIMS ONLY)  Patient's Last  Name, First Name, MPhilipI.  YOB: 1950  Johnson,Duane C                          Provider's Name  [facility] Medical Record No.  [id number]                             Onset Date:  11/12/23   Start of Care Date:  11/13/23   Type:     ___PT   _X_OT   ___SLP Medical Diagnosis:  CHF, anxiety                    OT Diagnosis:  Decreased ADL indep/safety Visits from SOC:  1     See note for plan of treatment, functional goals and certification details    I CERTIFY THE NEED FOR THESE SERVICES FURNISHED UNDER        THIS PLAN OF TREATMENT AND WHILE UNDER MY CARE     (Physician co-signature of this document indicates review and certification of the therapy plan).

## 2023-11-13 NOTE — PLAN OF CARE
Problem: Heart Failure  Goal: Optimal Coping  Intervention: Support Psychosocial Response  Recent Flowsheet Documentation  Taken 11/13/2023 1654 by Vania Reyes RN  Supportive Measures:   active listening utilized   goal-setting facilitated  Family/Support System Care: support provided     Problem: Heart Failure  Goal: Effective Oxygenation and Ventilation  Intervention: Promote Airway Secretion Clearance  Recent Flowsheet Documentation  Taken 11/13/2023 1100 by Vania Reyes RN  Cough And Deep Breathing: done independently per patient    Outcome: Progressing    Goal Outcome Evaluation:  Patient has been on 0.5 mL of supplemental oxygen via nasal cannula this shift, although patient's oxygen saturation has been 94-95% without supplemental oxygen. Patient has displayed persistence regarding the need for at home oxygen. Per Chetna MOSHER home oxygen assessment should be completed and pulse oximetry throughout the night. Patient's lung sounds are diminished but otherwise clear, patient has infrequent, dry non-productive cough. Patient has adequate intake and output and is on 1,800 mL fluid restriction. Patient received 2 doses of 60 mg of IV lasix, tolerated well.

## 2023-11-13 NOTE — PROVIDER NOTIFICATION
Pt placed on BiPAP 18/8, 30% in ED for support. Upon placing BiPAP on pts RR decreased and was less anxious.  Pt leak is 70 due to facial hair. VBG was done prior to BiPAP.    BiPAP settings/Parameters:        11/12/23 2302   Tech Time   $Tech Time (10 minute increments) 2   Mode: CPAP/ BiPAP/ AVAPS/ AVAPS AE   CPAP/BiPAP/ AVAPS/ AVAPS AE Mode BiPAP S/T   Equipment   Device V-60   CPAP/BiPAP/Settings   $CPAP/BiPAP Initial completed   BIPAP/CPAP On Standby On   IPAP/EPAP (cmH2O) 18/8   Rate (breaths/min) 14   Oxygen (%) 30   Timed Inspiration (sec) 0.8   IPAP RISE  Settings (V60) 2   CPAP/BiPAP Patient Parameters   IPAP (cm H2O) 18 cmH2O   EPAP (cm H2O) 8 cmH2O   Pressure Support (cm H2O) 10 cmH2O   RR Total (breaths/min) 27 breaths/min   Vt (mL) 403 mL   Minute Ventilation (L/min) 11.4 L/min   Peak Inspiratory Pressure (cm H2O) 24 cmH2O   Pt.  Leak (L/min) 70 L/min   CPAP/BiPAP/AVAPS/AVAPS AE Alarms   High Pressure (cm H2O) 25 cmH2O   Low Pressure (cm H2O) 5   Low Pressure Delay (sec) 20 sec   Lo Min Vent 2   High Rate (breaths/min) 50 breaths/min   Low Rate (breaths/min) 12   RT Device Skin Assessment   Oxygen Delivery Device CPAP/BiPAP Mask   Interface Face Mask - Medium   Site Appearance neck circumference Clean and dry   Site Appearance bridge of nose Clean and dry   Site Appearance occiput Clean and dry   Strap Tightness Finger Allowance between head and device strap   Device Skin Interventions Taken No adjustments needed

## 2023-11-13 NOTE — ED NOTES
[facility]   Admission Handoff    The patient is Duane C Johnson, 73 year old who arrived in the ED by AMBULANCE from home with a complaint of Shortness of Breath  . The patient's current symptoms are a recurrence of a past episode and during this time the symptoms have decreased. In the ED, patient was diagnosed with   Final diagnoses:   Congestive heart failure, unspecified HF chronicity, unspecified heart failure type (H)   Anxiety         Needed?: No    Allergies:    Allergies   Allergen Reactions    Clonazepam Other (See Comments)     Per spouse, acted like a zombie and he was shaky, could barely talk.       Past Medical Hx:   Past Medical History:   Diagnosis Date    Benign essential hypertension     CAD (coronary artery disease)     Chronic systolic heart failure (H)     Hypertension     ST elevation myocardial infarction (STEMI), unspecified artery (H)     Ventricular fibrillation (H)        Initial vitals were: BP: (!) 160/113  Pulse: 92  Temp: 97.8  F (36.6  C)  Resp: 27  SpO2: 90 %   Recent vital Signs: /83   Pulse 84   Temp 97.8  F (36.6  C) (Axillary)   Resp 21   SpO2 90%     Elimination Status: Continent: Yes     Activity Level: SBA    Fall Status: Reason for falls risk: High Risk Medications  nonskid shoes/slippers when out of bed, arm band in place, patient and family education, assistive device/personal items within reach, and activity supervised    Baseline Mental status: WDL  Current Mental Status changes: at basesline    Infection present or suspected this encounter: no  Sepsis suspected: Yes    Isolation type: NA.    Bariatric equipment needed?: No    In the ED these meds were given:   Medications   furosemide (LASIX) injection 20 mg (has no administration in time range)   LORazepam (ATIVAN) injection 1 mg (has no administration in time range)   midazolam (VERSED) injection 1.5 mg (1.5 mg Intravenous $Given 11/12/23 9810)       Drips running?   No    Home pump  No    Current LDAs: Peripheral IV: Site right ac; Gauge 20  none     Results:   Labs/Imaging  Ordered and Resulted from Time of ED Arrival Up to the Time of Departure from the ED  Results for orders placed or performed during the hospital encounter of 11/12/23 (from the past 24 hour(s))   Blood gas venous   Result Value Ref Range    pH Venous 7.36 7.32 - 7.43    pCO2 Venous 49 40 - 50 mm Hg    pO2 Venous 42 25 - 47 mm Hg    Bicarbonate Venous 28 21 - 28 mmol/L    Base Excess/Deficit 1.5 -7.7 - 1.9 mmol/L    FIO2 0    CBC with platelets, differential    Narrative    The following orders were created for panel order CBC with platelets, differential.  Procedure                               Abnormality         Status                     ---------                               -----------         ------                     CBC with platelets and d...[[id number]]  Abnormal            Final result                 Please view results for these tests on the individual orders.   Comprehensive metabolic panel   Result Value Ref Range    Sodium 140 135 - 145 mmol/L    Potassium 3.8 3.4 - 5.3 mmol/L    Carbon Dioxide (CO2) 24 22 - 29 mmol/L    Anion Gap 13 7 - 15 mmol/L    Urea Nitrogen 19.3 8.0 - 23.0 mg/dL    Creatinine 1.31 (H) 0.67 - 1.17 mg/dL    GFR Estimate 57 (L) >60 mL/min/1.73m2    Calcium 9.1 8.8 - 10.2 mg/dL    Chloride 103 98 - 107 mmol/L    Glucose 147 (H) 70 - 99 mg/dL    Alkaline Phosphatase 121 40 - 129 U/L    AST 53 (H) 0 - 45 U/L    ALT 69 0 - 70 U/L    Protein Total 7.4 6.4 - 8.3 g/dL    Albumin 3.7 3.5 - 5.2 g/dL    Bilirubin Total 0.4 <=1.2 mg/dL   [place] Draw    Narrative    The following orders were created for panel order [place] Draw.  Procedure                               Abnormality         Status                     ---------                               -----------         ------                     Extra Blue Top Tube[[id number]]                              Final result                  Please view results for these tests on the individual orders.   CBC with platelets and differential   Result Value Ref Range    WBC Count 15.5 (H) 4.0 - 11.0 10e3/uL    RBC Count 3.57 (L) 4.40 - 5.90 10e6/uL    Hemoglobin 11.5 (L) 13.3 - 17.7 g/dL    Hematocrit 34.7 (L) 40.0 - 53.0 %    MCV 97 78 - 100 fL    MCH 32.2 26.5 - 33.0 pg    MCHC 33.1 31.5 - 36.5 g/dL    RDW 13.2 10.0 - 15.0 %    Platelet Count 511 (H) 150 - 450 10e3/uL    % Neutrophils 86 %    % Lymphocytes 8 %    % Monocytes 4 %    % Eosinophils 1 %    % Basophils 1 %    % Immature Granulocytes 0 %    NRBCs per 100 WBC 0 <1 /100    Absolute Neutrophils 13.3 (H) 1.6 - 8.3 10e3/uL    Absolute Lymphocytes 1.3 0.8 - 5.3 10e3/uL    Absolute Monocytes 0.6 0.0 - 1.3 10e3/uL    Absolute Eosinophils 0.1 0.0 - 0.7 10e3/uL    Absolute Basophils 0.1 0.0 - 0.2 10e3/uL    Absolute Immature Granulocytes 0.1 <=0.4 10e3/uL    Absolute NRBCs 0.0 10e3/uL   Extra Blue Top Tube   Result Value Ref Range    Hold Specimen JIC    Troponin T, High Sensitivity   Result Value Ref Range    Troponin T, High Sensitivity 317 (HH) <=22 ng/L   Nt probnp inpatient (BNP)   Result Value Ref Range    N terminal Pro BNP Inpatient 16,038 (H) 0 - 900 pg/mL   Lactic Acid STAT   Result Value Ref Range    Lactic Acid 1.6 0.7 - 2.0 mmol/L   XR Chest Port 1 View    Narrative    EXAM: XR CHEST PORT 1 VIEW  LOCATION: [facility]  DATE: 11/12/2023    INDICATION: Shortness of breath.  COMPARISON: Portable chest single view 11/09/2023 at 0853 hours.      Impression    IMPRESSION: Increasing bilateral groundglass density infiltrates, more apparent on the right, likely representing an infectious or an inflammatory process. Differential diagnostic possibilities would include COVID-19 infection. Cardiac size approaches   upper limits of normal. Increasing venous hypertension. Small bilateral pleural effusions, more apparent on the left. Mildly atherosclerotic thoracic  aorta. Degenerative changes both shoulders and the spine. Monitoring leads have been placed overlying   the chest.   Symptomatic Influenza A/B, RSV, & SARS-CoV2 PCR (COVID-19) Nasopharyngeal    Specimen: Nasopharyngeal; Swab   Result Value Ref Range    Influenza A PCR Negative Negative    Influenza B PCR Negative Negative    RSV PCR Negative Negative    SARS CoV2 PCR Negative Negative    Narrative    Testing was performed using the Xpert Xpress CoV2/Flu/RSV Assay on the [employer] GeneXpert Instrument. This test should be ordered for the detection of SARS-CoV-2, influenza, and RSV viruses in individuals who meet clinical and/or epidemiological criteria. Test performance is unknown in asymptomatic patients. This test is for in vitro diagnostic use under the FDA EUA for laboratories certified under CLIA to perform high or moderate complexity testing. This test has not been FDA cleared or approved. A negative result does not rule out the presence of PCR inhibitors in the specimen or target RNA in concentration below the limit of detection for the assay. If only one viral target is positive but coinfection with multiple targets is suspected, the sample should be re-tested with another FDA cleared, approved, or authorized test, if coinfection would change clinical management. This test was validated by the [facility]. These laboratories are certified under the Clinical Laboratory Improvement Amendments of 1988 (CLIA-88) as qualified to perform high complexity laboratory testing.   Troponin T, High Sensitivity   Result Value Ref Range    Troponin T, High Sensitivity 330 (HH) <=22 ng/L       For the majority of the shift this patient's behavior was Green     Cardiac Rhythm: Normal Sinus  Pt needs tele? Yes  Skin/wound Issues:  None noted in limited ER focused assessment.     Code Status: Full Code    Pain control: good    Nausea control: pt had none    Abnormal labs/tests/findings requiring intervention:  see results. Placed on 2L NC.     Patient tested for COVID 19 prior to admission: YES     OBS brochure/video discussed/provided to patient/family: Yes     Family present during ED course? Yes     Family Comments/Social Situation comments: None.     Tasks needing completion: None    Hortencia Zendejas RN

## 2023-11-13 NOTE — PROGRESS NOTES
Skin affirmation note    Admitting nurse completed full skin assessment, Mehdi score and Mehdi interventions. This writer agrees with the initial skin assessment findings.    Holly Geller RN on 11/13/2023 at 4:43 AM

## 2023-11-13 NOTE — ED PROVIDER NOTES
History     Chief Complaint   Patient presents with    Shortness of Breath     HPI  Duane C Johnson is a 73 year old male  with history of coronary artery disease and recent STEMI who was just hospitalized for CHF exacerbation presenting for evaluation of dyspnea tonight.  He was discharged earlier today and was feeling relatively well although not back to normal.  Tonight around 10:30 PM he reports relatively abrupt onset of dyspnea.  Eventually called 911 who brought him in for evaluation.  Patient denies any chest pain with this.  Patient admits to feeling anxious and tried a dose of hydroxyzine that was prescribed for his anxiety.  Reports occasional cough.  No fevers or chills.  Denies nausea or sweatiness.    Allergies:  Allergies   Allergen Reactions    Clonazepam Other (See Comments)     Per spouse, acted like a zombie and he was shaky, could barely talk.       Problem List:    Patient Active Problem List    Diagnosis Date Noted    Acute respiratory failure with hypoxia (H) 11/07/2023     Priority: Medium    Multifocal pneumonia 11/07/2023     Priority: Medium    Elevated brain natriuretic peptide (BNP) level 11/07/2023     Priority: Medium    Primary hypertension 11/07/2023     Priority: Medium    SOB (shortness of breath) 11/07/2023     Priority: Medium    Coronary artery disease due to calcified coronary lesion 11/07/2023     Priority: Medium    Pneumonia of left upper lobe due to infectious organism 11/07/2023     Priority: Medium    Systolic CHF (H) 11/07/2023     Priority: Medium    Hx of cardiac arrest 10/27/2023     Priority: Medium    ST elevation MI (STEMI) (H) 10/26/2023     Priority: Medium    Prostate cancer (H) 09/23/2023     Priority: Medium    Hyperlipidemia LDL goal <130 01/26/2016     Priority: Medium    Adiposity 05/06/2010     Priority: Medium    Tobacco dependence syndrome 01/23/2008     Priority: Medium        Past Medical History:    Past Medical History:   Diagnosis Date    Benign  essential hypertension     CAD (coronary artery disease)     Chronic systolic heart failure (H)     Hypertension     ST elevation myocardial infarction (STEMI), unspecified artery (H)     Ventricular fibrillation (H)        Past Surgical History:    Past Surgical History:   Procedure Laterality Date    COLONOSCOPY N/A 3/23/2023    Procedure: COLONOSCOPY, FLEXIBLE, WITH LESION REMOVAL USING SNARE;  Surgeon: Jose Faustin MD;  Location: [facility]    CV CENTRAL VENOUS CATHETER PLACEMENT N/A 10/26/2023    Procedure: Central Venous Catheter Placement;  Surgeon: Rob Lyles MD;  Location: [facility] CARDIAC CATH LAB    CV CORONARY ANGIOGRAM N/A 10/27/2023    Procedure: Coronary Angiogram;  Surgeon: Rob Lyles MD;  Location:  HEART CARDIAC CATH LAB    CV CORONARY ANGIOGRAM N/A 10/26/2023    Procedure: Coronary Angiogram;  Surgeon: Rob Lyles MD;  Location:  HEART CARDIAC CATH LAB    CV LEFT HEART CATH N/A 10/26/2023    Procedure: Left Heart Catheterization;  Surgeon: Rob Lyles MD;  Location: [facility] CARDIAC CATH LAB    CV PCI N/A 10/27/2023    Procedure: Percutaneous Coronary Intervention;  Surgeon: Rob Lyles MD;  Location:  HEART CARDIAC CATH LAB    CV PCI STENT DRUG ELUTING N/A 10/26/2023    Procedure: Percutaneous Coronary Intervention Stent;  Surgeon: Rob Lyles MD;  Location:  HEART CARDIAC CATH LAB       Family History:    Family History   Problem Relation Age of Onset    Other Cancer Mother     Obesity Mother     Cervical Cancer Sister     GI problems Father     Other Cancer Sister        Social History:  Marital Status:   [2]  Social History     Tobacco Use    Smoking status: Every Day     Packs/day: .25     Types: Cigarettes     Passive exposure: Current    Smokeless tobacco: Never   Vaping Use    Vaping Use: Never used   Substance Use Topics    Alcohol use: Yes     Comment: occas    Drug use: No        Medications:     acetaminophen (TYLENOL) 325 MG tablet  amiodarone (PACERONE) 200 MG tablet  amoxicillin-clavulanate (AUGMENTIN) 875-125 MG tablet  apixaban ANTICOAGULANT (ELIQUIS) 5 MG tablet  atorvastatin (LIPITOR) 80 MG tablet  cetirizine (ZYRTEC) 10 MG tablet  fish oil-omega-3 fatty acids 1000 MG capsule  furosemide (LASIX) 20 MG tablet  hydrOXYzine (ATARAX) 10 MG tablet  losartan (COZAAR) 25 MG tablet  melatonin 10 MG TABS tablet  metoprolol succinate ER (TOPROL XL) 25 MG 24 hr tablet  multivitamin w/minerals (THERA-VIT-M) tablet  ticagrelor (BRILINTA) 90 MG tablet  traZODone (DESYREL) 50 MG tablet  vitamin C (ASCORBIC ACID) 1000 MG TABS          Review of Systems   Constitutional:  Negative for appetite change, chills, diaphoresis, fatigue and fever.   HENT:  Negative for congestion.    Respiratory:  Positive for shortness of breath. Negative for cough and chest tightness.    Cardiovascular:  Negative for chest pain, palpitations and leg swelling.   Gastrointestinal:  Negative for abdominal pain.   Musculoskeletal:  Negative for myalgias.   Skin:  Negative for rash.   Neurological:  Negative for weakness, light-headedness, numbness and headaches.   Psychiatric/Behavioral:  The patient is nervous/anxious.    All other systems reviewed and are negative.      Physical Exam   BP: (!) 160/113  Pulse: 92  Temp: 97.8  F (36.6  C)  Resp: 27  SpO2: 90 %      Physical Exam  Vitals and nursing note reviewed.   Constitutional:       Appearance: He is well-developed. He is diaphoretic (slight). He is not ill-appearing.   HENT:      Head: Atraumatic.      Mouth/Throat:      Mouth: Mucous membranes are moist.   Cardiovascular:      Rate and Rhythm: Normal rate and regular rhythm.   Pulmonary:      Breath sounds: No decreased breath sounds, wheezing or rhonchi.      Comments: Tachypneic with some increased work of breathing  Abdominal:      Palpations: Abdomen is soft.      Comments: Protuberant   Musculoskeletal:      Cervical back: Normal  range of motion.      Right lower leg: No tenderness. No edema.      Left lower leg: No tenderness. No edema.   Skin:     General: Skin is warm.      Capillary Refill: Capillary refill takes less than 2 seconds.   Neurological:      Mental Status: He is alert and oriented to person, place, and time.   Psychiatric:         Mood and Affect: Mood normal.         ED Course             EKG Interpretation:      Interpreted by Raymond Oden MD  Time reviewed:1053   Symptoms at time of EKG: dyspnea   Rhythm: sinus rhythm with PVCs and motion artifact  Rate: Normal  Axis: Normal  Ectopy: PVCs  Conduction: Low voltage QRS  ST Segments/ T Waves: No acute ischemic changes  Q Waves: v1, v2, and v3  Comparison to prior: New motion artifact, otherwise similar to previous    Clinical Impression: Sinus rhythm with motion artifact, old anterior infarct similar to previous        ED Course as of 11/13/23 0204   Sun Nov 12, 2023   2318 Troponin T, High Sensitivity(!!): 317  Noted elevated troponin, appears to be trending down from recent. WIll trend here to be sure this is not acute     Procedures                Results for orders placed or performed during the hospital encounter of 11/12/23 (from the past 24 hour(s))   Blood gas venous   Result Value Ref Range    pH Venous 7.36 7.32 - 7.43    pCO2 Venous 49 40 - 50 mm Hg    pO2 Venous 42 25 - 47 mm Hg    Bicarbonate Venous 28 21 - 28 mmol/L    Base Excess/Deficit 1.5 -7.7 - 1.9 mmol/L    FIO2 0    CBC with platelets, differential    Narrative    The following orders were created for panel order CBC with platelets, differential.  Procedure                               Abnormality         Status                     ---------                               -----------         ------                     CBC with platelets and d...[[id number]]  Abnormal            Final result                 Please view results for these tests on the individual orders.   Comprehensive metabolic  panel   Result Value Ref Range    Sodium 140 135 - 145 mmol/L    Potassium 3.8 3.4 - 5.3 mmol/L    Carbon Dioxide (CO2) 24 22 - 29 mmol/L    Anion Gap 13 7 - 15 mmol/L    Urea Nitrogen 19.3 8.0 - 23.0 mg/dL    Creatinine 1.31 (H) 0.67 - 1.17 mg/dL    GFR Estimate 57 (L) >60 mL/min/1.73m2    Calcium 9.1 8.8 - 10.2 mg/dL    Chloride 103 98 - 107 mmol/L    Glucose 147 (H) 70 - 99 mg/dL    Alkaline Phosphatase 121 40 - 129 U/L    AST 53 (H) 0 - 45 U/L    ALT 69 0 - 70 U/L    Protein Total 7.4 6.4 - 8.3 g/dL    Albumin 3.7 3.5 - 5.2 g/dL    Bilirubin Total 0.4 <=1.2 mg/dL   [place] Draw    Narrative    The following orders were created for panel order [place] Draw.  Procedure                               Abnormality         Status                     ---------                               -----------         ------                     Extra Blue Top Tube[[id number]]                              Final result                 Please view results for these tests on the individual orders.   CBC with platelets and differential   Result Value Ref Range    WBC Count 15.5 (H) 4.0 - 11.0 10e3/uL    RBC Count 3.57 (L) 4.40 - 5.90 10e6/uL    Hemoglobin 11.5 (L) 13.3 - 17.7 g/dL    Hematocrit 34.7 (L) 40.0 - 53.0 %    MCV 97 78 - 100 fL    MCH 32.2 26.5 - 33.0 pg    MCHC 33.1 31.5 - 36.5 g/dL    RDW 13.2 10.0 - 15.0 %    Platelet Count 511 (H) 150 - 450 10e3/uL    % Neutrophils 86 %    % Lymphocytes 8 %    % Monocytes 4 %    % Eosinophils 1 %    % Basophils 1 %    % Immature Granulocytes 0 %    NRBCs per 100 WBC 0 <1 /100    Absolute Neutrophils 13.3 (H) 1.6 - 8.3 10e3/uL    Absolute Lymphocytes 1.3 0.8 - 5.3 10e3/uL    Absolute Monocytes 0.6 0.0 - 1.3 10e3/uL    Absolute Eosinophils 0.1 0.0 - 0.7 10e3/uL    Absolute Basophils 0.1 0.0 - 0.2 10e3/uL    Absolute Immature Granulocytes 0.1 <=0.4 10e3/uL    Absolute NRBCs 0.0 10e3/uL   Extra Blue Top Tube   Result Value Ref Range    Hold Specimen JIC    Troponin T, High Sensitivity    Result Value Ref Range    Troponin T, High Sensitivity 317 (HH) <=22 ng/L   Nt probnp inpatient (BNP)   Result Value Ref Range    N terminal Pro BNP Inpatient 16,038 (H) 0 - 900 pg/mL   Lactic Acid STAT   Result Value Ref Range    Lactic Acid 1.6 0.7 - 2.0 mmol/L   XR Chest Port 1 View    Narrative    EXAM: XR CHEST PORT 1 VIEW  LOCATION: [facility]  DATE: 11/12/2023    INDICATION: Shortness of breath.  COMPARISON: Portable chest single view 11/09/2023 at 0853 hours.      Impression    IMPRESSION: Increasing bilateral groundglass density infiltrates, more apparent on the right, likely representing an infectious or an inflammatory process. Differential diagnostic possibilities would include COVID-19 infection. Cardiac size approaches   upper limits of normal. Increasing venous hypertension. Small bilateral pleural effusions, more apparent on the left. Mildly atherosclerotic thoracic aorta. Degenerative changes both shoulders and the spine. Monitoring leads have been placed overlying   the chest.   Symptomatic Influenza A/B, RSV, & SARS-CoV2 PCR (COVID-19) Nasopharyngeal    Specimen: Nasopharyngeal; Swab   Result Value Ref Range    Influenza A PCR Negative Negative    Influenza B PCR Negative Negative    RSV PCR Negative Negative    SARS CoV2 PCR Negative Negative    Narrative    Testing was performed using the Xpert Xpress CoV2/Flu/RSV Assay on the [employer] Gene[employer]pert Instrument. This test should be ordered for the detection of SARS-CoV-2, influenza, and RSV viruses in individuals who meet clinical and/or epidemiological criteria. Test performance is unknown in asymptomatic patients. This test is for in vitro diagnostic use under the FDA EUA for laboratories certified under CLIA to perform high or moderate complexity testing. This test has not been FDA cleared or approved. A negative result does not rule out the presence of PCR inhibitors in the specimen or target RNA in concentration  below the limit of detection for the assay. If only one viral target is positive but coinfection with multiple targets is suspected, the sample should be re-tested with another FDA cleared, approved, or authorized test, if coinfection would change clinical management. This test was validated by the [facility] [employer]. These laboratories are certified under the Clinical Laboratory Improvement Amendments of 1988 (CLIA-88) as qualified to perform high complexity laboratory testing.   Troponin T, High Sensitivity   Result Value Ref Range    Troponin T, High Sensitivity 330 (HH) <=22 ng/L       Medications   furosemide (LASIX) injection 20 mg (has no administration in time range)   LORazepam (ATIVAN) injection 1 mg (has no administration in time range)   midazolam (VERSED) injection 1.5 mg (1.5 mg Intravenous $Given 11/12/23 6785)     12:34 AM; patient able be titrated off of BiPAP and now is on room air breathing comfortably.  Awaiting second troponin which will be drawn shortly.    1:33 AM Patient re-assessed: Patient resting relatively comfortably but breathing is slightly increased.  Oxygen saturation is 89 to 91% on room air.  Patient advised of findings so far.  BNP has gone up and his second opponent is slightly increased.  He does appear to have some degree of mild fluid overload especially given his tenuous heart, recommended observation overnight and further diuresis.  We will also treat his anxiety as he has been dealing with significant anxiety/PTSD after his recent cardiac arrest.    2:04 AM: Discussed with Dr Marinelli. Accepts for obs admission.      Assessments & Plan (with Medical Decision Making)  73-year-old with recent STEMI and cardiac arrest who was recently hospitalized for CHF and pneumonia just discharged today presenting for evaluation of increasing difficulty breathing and anxiety.  Very anxious upon arrival.  Symptoms subsided quickly with BiPAP.  Patient was able to be transition  back to home oxygen but did have lower normal oxygen levels from 89 to 91% on room air.  Put on nasal cannula oxygen.  Work-up showed an increasing BNP compared to his most recent admission from several days ago.  Troponin also remains elevated and did trend slightly upwards on repeat although clinically does not appear to be an acute coronary event.  Admitted for observation for diuresis and further treatment of his anxiety.     I have reviewed the nursing notes.    I have reviewed the findings, diagnosis, plan and need for follow up with the patient.        New Prescriptions    No medications on file       Final diagnoses:   Congestive heart failure, unspecified HF chronicity, unspecified heart failure type (H)   Anxiety       11/12/2023   [facility] EMERGENCY DEPT       Oden, Raymond Vogel MD  11/13/23 9745

## 2023-11-13 NOTE — MEDICATION SCRIBE - ADMISSION MEDICATION HISTORY
Medication Scribe Admission Medication History    Admission medication history is complete. The information provided in this note is only as accurate as the sources available at the time of the update.    Information Source(s): Patient, Family member, and CareEverywhere/SureScripts via  With spouse by phone and with hospital med dispense record .    Pertinent Information: Patient took 1 dose of each of these last night before coming into hospital, Augmentin, Apixaban, Brilinta.  Had no other medicines while at home yesterday.  The rest of the PTA last doses are recorded from hospital discharge paperwork.    Changes made to PTA medication list:  Added: None  Deleted: None  Changed: None    Medication Affordability:  Not including over the counter (OTC) medications, was there a time in the past 3 months when you did not take your medications as prescribed because of cost?: No      Allergies reviewed with patient's spouse and updates made in EHR: yes    Medication History Completed By: Brisa Pyle 11/13/2023 12:23 PM    PTA Med List   Medication Sig Last Dose    acetaminophen (TYLENOL) 325 MG tablet Take 2 tablets (650 mg) by mouth every 6 hours as needed Unknown    amiodarone (PACERONE) 200 MG tablet Take 1 tablet (200 mg) by mouth daily 11/12/2023 at am    amoxicillin-clavulanate (AUGMENTIN) 875-125 MG tablet Take 1 tablet by mouth 2 times daily for 2 days 11/12/2023 at pm    apixaban ANTICOAGULANT (ELIQUIS) 5 MG tablet Take 1 tablet (5 mg) by mouth 2 times daily 11/12/2023 at pm    atorvastatin (LIPITOR) 80 MG tablet Take 1 tablet (80 mg) by mouth daily (Patient taking differently: Take 80 mg by mouth at bedtime) 11/11/2023 at 2146    cetirizine (ZYRTEC) 10 MG tablet Take 10 mg by mouth daily 11/12/2023 at 0911    fish oil-omega-3 fatty acids 1000 MG capsule Take 1 g by mouth 2 times daily Also contains another supplement 10/25/2023 at am    furosemide (LASIX) 20 MG tablet Take 1 tablet (20 mg) by mouth daily  11/12/2023 at am    hydrOXYzine (ATARAX) 10 MG tablet Take 1 tablet (10 mg) by mouth every 8 hours as needed for anxiety 11/12/2023 at 0007    losartan (COZAAR) 25 MG tablet Take 0.5 tablets (12.5 mg) by mouth daily new, not on yet at new med    melatonin 10 MG TABS tablet Take 1 tablet (10 mg) by mouth at bedtime (Patient taking differently: Take 10 mg by mouth nightly as needed for sleep) 11/11/2023 at 2147    metoprolol succinate ER (TOPROL XL) 25 MG 24 hr tablet Take 0.5 tablets (12.5 mg) by mouth daily new med at not on yet    multivitamin w/minerals (THERA-VIT-M) tablet Take 1 tablet by mouth daily 10/25/2023 at am    ticagrelor (BRILINTA) 90 MG tablet Take 1 tablet (90 mg) by mouth every 12 hours 11/12/2023 at pm    traZODone (DESYREL) 50 MG tablet Take 0.5 tablets (25 mg) by mouth nightly as needed for sleep not on yet at new med    vitamin C (ASCORBIC ACID) 1000 MG TABS Take 1,000 mg by mouth daily 11/12/2023 at 0908

## 2023-11-13 NOTE — CONSULTS
Care Management Initial Consult    General Information  Assessment completed with: Spouse or significant other, VM-chart review,    Type of CM/SW Visit: Initial Assessment    Primary Care Provider verified and updated as needed: Yes   Readmission within the last 30 days: previous discharge plan unsuccessful      Reason for Consult: discharge planning  Advance Care Planning: Advance Care Planning Reviewed: no concerns identified          Communication Assessment  Patient's communication style: spoken language (English or Bilingual)    Hearing Difficulty or Deaf: no   Wear Glasses or Blind: yes    Cognitive  Cognitive/Neuro/Behavioral: WDL  Level of Consciousness: alert  Arousal Level: opens eyes spontaneously  Orientation: oriented x 4             Living Environment:   People in home: spouse     Current living Arrangements: house      Able to return to prior arrangements: yes       Family/Social Support:  Care provided by: self  Provides care for: no one                Description of Support System:           Current Resources:   Patient receiving home care services: Yes  Skilled Home Care Services: Skilled Nursing, Home Health Aid, Physical Therapy, Occupational Therapy  Community Resources: Home Care  Equipment currently used at home: walker, standard, wheelchair, manual  Supplies currently used at home: None    Employment/Financial:  Employment Status: retired        Financial Concerns: none           Does the patient's insurance plan have a 3 day qualifying hospital stay waiver?  No    Lifestyle & Psychosocial Needs:  Social Determinants of Health     Food Insecurity: Low Risk  (10/3/2023)    Food Insecurity     Within the past 12 months, did you worry that your food would run out before you got money to buy more?: No     Within the past 12 months, did the food you bought just not last and you didn t have money to get more?: No   Depression: Not at risk (3/13/2023)    PHQ-2     PHQ-2 Score: 0   Housing Stability:  Low Risk  (10/3/2023)    Housing Stability     Do you have housing? : Yes     Are you worried about losing your housing?: No   Tobacco Use: High Risk (11/7/2023)    Patient History     Smoking Tobacco Use: Every Day     Smokeless Tobacco Use: Never     Passive Exposure: Current   Financial Resource Strain: Low Risk  (10/3/2023)    Financial Resource Strain     Within the past 12 months, have you or your family members you live with been unable to get utilities (heat, electricity) when it was really needed?: No   Alcohol Use: Not on file   Transportation Needs: Low Risk  (10/3/2023)    Transportation Needs     Within the past 12 months, has lack of transportation kept you from medical appointments, getting your medicines, non-medical meetings or appointments, work, or from getting things that you need?: No   Physical Activity: Not on file   Interpersonal Safety: Low Risk  (10/9/2023)    Interpersonal Safety     Do you feel physically and emotionally safe where you currently live?: Yes     Within the past 12 months, have you been hit, slapped, kicked or otherwise physically hurt by someone?: No     Within the past 12 months, have you been humiliated or emotionally abused in other ways by your partner or ex-partner?: No   Stress: Not on file   Social Connections: Not on file       Functional Status:  Prior to admission patient needed assistance:   Dependent ADLs:: Independent  Dependent IADLs:: Independent       Mental Health Status:  Mental Health Status: No Current Concerns       Chemical Dependency Status:  Chemical Dependency Status: No Current Concerns             Values/Beliefs:  Spiritual, Cultural Beliefs, Worship Practices, Values that affect care: no                 Care Management Discharge Note    Discharge Date: 11/13/2023       Discharge Disposition: Home Care    Discharge Services: None    Discharge DME: Oxygen, Other (see comment) (Pt private pay O2)    Discharge Transportation: family or friend will  provide    Private pay costs discussed: Not applicable    Does the patient's insurance plan have a 3 day qualifying hospital stay waiver?  No    PAS Confirmation Code:    Patient/family educated on Medicare website which has current facility and service quality ratings: no    Education Provided on the Discharge Plan: Yes  Persons Notified of Discharge Plans: Patient, wife melissa, HC ACFV  Patient/Family in Agreement with the Plan: yes    Handoff Referral Completed: Yes    Additional Information:  Per MD at IDT rounds pt is receiving a swallow study and then is likely ready for discharge today. CM consulted due to elevated risk score and discharge planning. Pt is a readmission to the hospital, pt was discharged 11/12 AM and was readmitted 11/12 PM.     CM received call from pts wife melissa regarding discharge planning. Mleissa inquiring about getting home O2, CM informed Melissa CM would share her request with the MD. MD updated during rounds with this information, MD to have OT assess for O2 qualification and needs, but pt has been on RA per clinical team and may not qualify for home O2. Wife to discuss clinical needs with clinical team. CM discussed resuming [facility] (Phone: [phone]) RN,PT,OT,HHA orders, melissa in agreement with this for discharge plan.    CM met bedside with pt to discuss discharge planning, pt also in agreement to resume HC orders, CM provided pt with brochure and educated about homebound status and homecare services. Pt in agreement. Pt inquired to CM about home O2 and stairlift, CM informed pt that CM does not write orders for either, pt insistent that CM talk with his wife. Pt called his wife Melissa on speaker phone and CM discussed both with Osman Torres understanding that chair lift will not be paid for by medicare and will be private pay, CM informed her that CM does not write orders for O2. Pt stated he was going to privately pay for his O2 and has been speaking  with O2 companies. CM updated pts Bedside RN. Wife and pt denied further needs regarding discharge planning and home care needs.     CM discussed transport with wife who is available to transport pt. CM discussed with pts bedside RN that wife will provide transport, RN to contact wife when MD discharges pt.    Plan: Home with homecare [facility] (Phone: [phone])  RN,PT,OT,HHA    Transport: wife       Pallavi Carlos RNCM  Care Transitions Registered Nurse  Tele: [phone]

## 2023-11-13 NOTE — UTILIZATION REVIEW
Admission Status; Secondary Review Determination       Under the authority of the Utilization Management Committee, the utilization review process indicated a secondary review on the above patient. The review outcome is based on review of the medical records, discussions with staff, and applying clinical experience noted on the date of the review.     (x) Inpatient Status Appropriate - This patient's medical care is consistent with medical management for inpatient care and reasonable inpatient medical practice.     RATIONALE FOR DETERMINATION     Patient is a 74 yo male with a history of HTN, HLD and multiple recent admissions: 10/26-11/3 with VF/VT cardiac arrest X2, anterior STEMI s/p PCI to LAD and 11/6-11/12/23 with systolic CHF and respiratory failure 2/2 aspiration pneumonia.  He presented to the emergency department on 11/12/2023 after having been discharged earlier in the day.  He was short of breath and anxious.  Emergency department evaluation showed hypoxia with need of supplemental oxygen at 8 L/min.  This escalated to requiring BiPAP at 100%, per nursing notes.  Laboratory evaluation showed BNP 16,038, creatinine 1.31, troponin 317 (330 and 358 on repeat), white blood cells 15.5, hemoglobin 11.5, negative testing for COVID/influenza/RSV, and chest x-ray suggestive of pulmonary edema.  He was started on IV diuresis with Lasix 60 mg IV.  He was admitted for further cares.  He was in quite a bit of distress for a while, requiring BiPAP as discussed above.  He remains in the hospital being diuresed with Lasix 60 mg IV twice daily.  Inpatient admission is appropriate for acute hypoxic respiratory failure and respiratory distress due to congestive heart failure exacerbation with need of IV diuresis.    At the time of admission with the information available to the attending physician more than 2 nights Hospital complex care was anticipated, based on patient risk of adverse outcome if treated as outpatient  and complex care required. Inpatient admission is appropriate based on the Medicare guidelines.     This document was produced using voice recognition software       The information on this document is developed by the utilization review team in order for the business office to ensure compliance. This only denotes the appropriateness of proper admission status and does not reflect the quality of care rendered.   The definitions of Inpatient Status and Observation Status used in making the determination above are those provided in the CMS Coverage Manual, Chapter 1 and Chapter 6, section 70.4.   Sincerely,     Cristobal Foley MD    Utilization Review  Physician Advisor  [facility].

## 2023-11-13 NOTE — ED TRIAGE NOTES
Pt arrived via EMS for SOB. Pt was discharged from [facility] for pneumonia today. Pt was placed on 3L NC by EMS. Per EMS pt was 95% on RA at home and decomp upon arrival. O2 sats 90% on 3L. Pt placed on 8L oxymask. O2 sats 97%. Pt has labored breathing, tachypnea. Pt appears very anxious. Breathing exercises performed with little success. Pt has hx of recent cardiac arrest x2 and stent placement for MI in 10/26-27th. Writer paged RT for bipap and MD Oden to assess pt. Pt denied CP.     Triage Assessment (Adult)       Row Name 11/12/23 0139          Triage Assessment    Airway WDL WDL        Respiratory WDL    Respiratory WDL X  SOB, hyperventilation        Skin Circulation/Temperature WDL    Skin Circulation/Temperature WDL WDL        Cardiac WDL    Cardiac WDL WDL        Peripheral/Neurovascular WDL    Peripheral Neurovascular WDL WDL        Cognitive/Neuro/Behavioral WDL    Cognitive/Neuro/Behavioral WDL WDL

## 2023-11-13 NOTE — CONSULTS
CLINICAL NUTRITION SERVICES NOTE    Received consult to provide 2 gm Sodium diet education as part of standard order set.  Pt received 2 gm Sodium diet education during prior admission last week 11/8/23.

## 2023-11-13 NOTE — ED NOTES
Pt placed on bipap. O2 30%, Ipap 18, Epap 8, rate 14. Pt is tolerating Bipap well and has an improvement in breathing.

## 2023-11-14 DIAGNOSIS — I21.3 ST ELEVATION MYOCARDIAL INFARCTION (STEMI), UNSPECIFIED ARTERY (H): Primary | ICD-10-CM

## 2023-11-14 LAB
ANION GAP SERPL CALCULATED.3IONS-SCNC: 12 MMOL/L (ref 7–15)
BUN SERPL-MCNC: 28.2 MG/DL (ref 8–23)
CALCIUM SERPL-MCNC: 8.7 MG/DL (ref 8.8–10.2)
CHLORIDE SERPL-SCNC: 105 MMOL/L (ref 98–107)
CREAT SERPL-MCNC: 1.34 MG/DL (ref 0.67–1.17)
DEPRECATED HCO3 PLAS-SCNC: 26 MMOL/L (ref 22–29)
EGFRCR SERPLBLD CKD-EPI 2021: 56 ML/MIN/1.73M2
ERYTHROCYTE [DISTWIDTH] IN BLOOD BY AUTOMATED COUNT: 13.4 % (ref 10–15)
GLUCOSE SERPL-MCNC: 90 MG/DL (ref 70–99)
HCT VFR BLD AUTO: 30.6 % (ref 40–53)
HGB BLD-MCNC: 10.2 G/DL (ref 13.3–17.7)
MAGNESIUM SERPL-MCNC: 2.1 MG/DL (ref 1.7–2.3)
MCH RBC QN AUTO: 32.5 PG (ref 26.5–33)
MCHC RBC AUTO-ENTMCNC: 33.3 G/DL (ref 31.5–36.5)
MCV RBC AUTO: 98 FL (ref 78–100)
PLATELET # BLD AUTO: 429 10E3/UL (ref 150–450)
POTASSIUM SERPL-SCNC: 3.4 MMOL/L (ref 3.4–5.3)
POTASSIUM SERPL-SCNC: 3.8 MMOL/L (ref 3.4–5.3)
RBC # BLD AUTO: 3.14 10E6/UL (ref 4.4–5.9)
SODIUM SERPL-SCNC: 143 MMOL/L (ref 135–145)
WBC # BLD AUTO: 7 10E3/UL (ref 4–11)

## 2023-11-14 PROCEDURE — 85027 COMPLETE CBC AUTOMATED: CPT

## 2023-11-14 PROCEDURE — 36415 COLL VENOUS BLD VENIPUNCTURE: CPT | Performed by: STUDENT IN AN ORGANIZED HEALTH CARE EDUCATION/TRAINING PROGRAM

## 2023-11-14 PROCEDURE — 250N000013 HC RX MED GY IP 250 OP 250 PS 637: Performed by: STUDENT IN AN ORGANIZED HEALTH CARE EDUCATION/TRAINING PROGRAM

## 2023-11-14 PROCEDURE — 250N000013 HC RX MED GY IP 250 OP 250 PS 637

## 2023-11-14 PROCEDURE — 84132 ASSAY OF SERUM POTASSIUM: CPT | Performed by: STUDENT IN AN ORGANIZED HEALTH CARE EDUCATION/TRAINING PROGRAM

## 2023-11-14 PROCEDURE — 99233 SBSQ HOSP IP/OBS HIGH 50: CPT | Performed by: STUDENT IN AN ORGANIZED HEALTH CARE EDUCATION/TRAINING PROGRAM

## 2023-11-14 PROCEDURE — 83735 ASSAY OF MAGNESIUM: CPT | Performed by: STUDENT IN AN ORGANIZED HEALTH CARE EDUCATION/TRAINING PROGRAM

## 2023-11-14 PROCEDURE — 250N000013 HC RX MED GY IP 250 OP 250 PS 637: Performed by: INTERNAL MEDICINE

## 2023-11-14 PROCEDURE — 120N000001 HC R&B MED SURG/OB

## 2023-11-14 PROCEDURE — 80048 BASIC METABOLIC PNL TOTAL CA: CPT

## 2023-11-14 PROCEDURE — 36415 COLL VENOUS BLD VENIPUNCTURE: CPT

## 2023-11-14 RX ORDER — POTASSIUM CHLORIDE 1500 MG/1
40 TABLET, EXTENDED RELEASE ORAL ONCE
Status: COMPLETED | OUTPATIENT
Start: 2023-11-14 | End: 2023-11-14

## 2023-11-14 RX ORDER — CLOPIDOGREL BISULFATE 75 MG/1
300 TABLET ORAL ONCE
Status: COMPLETED | OUTPATIENT
Start: 2023-11-14 | End: 2023-11-14

## 2023-11-14 RX ORDER — CLOPIDOGREL BISULFATE 75 MG/1
75 TABLET ORAL DAILY
Status: DISCONTINUED | OUTPATIENT
Start: 2023-11-15 | End: 2023-11-15 | Stop reason: HOSPADM

## 2023-11-14 RX ADMIN — AMOXICILLIN AND CLAVULANATE POTASSIUM 1 TABLET: 875; 125 TABLET, COATED ORAL at 08:41

## 2023-11-14 RX ADMIN — MULTIPLE VITAMINS W/ MINERALS TAB 1 TABLET: TAB at 08:42

## 2023-11-14 RX ADMIN — CLOPIDOGREL BISULFATE 300 MG: 75 TABLET ORAL at 08:42

## 2023-11-14 RX ADMIN — ESCITALOPRAM OXALATE 10 MG: 10 TABLET ORAL at 08:41

## 2023-11-14 RX ADMIN — AMIODARONE HYDROCHLORIDE 200 MG: 200 TABLET ORAL at 08:41

## 2023-11-14 RX ADMIN — FUROSEMIDE 40 MG: 40 TABLET ORAL at 08:41

## 2023-11-14 RX ADMIN — POTASSIUM CHLORIDE 40 MEQ: 1500 TABLET, EXTENDED RELEASE ORAL at 08:41

## 2023-11-14 RX ADMIN — OXYCODONE HYDROCHLORIDE AND ACETAMINOPHEN 1000 MG: 500 TABLET ORAL at 08:41

## 2023-11-14 RX ADMIN — APIXABAN 5 MG: 5 TABLET, FILM COATED ORAL at 18:20

## 2023-11-14 RX ADMIN — CETIRIZINE HYDROCHLORIDE 10 MG: 10 TABLET, FILM COATED ORAL at 08:41

## 2023-11-14 RX ADMIN — APIXABAN 5 MG: 5 TABLET, FILM COATED ORAL at 08:42

## 2023-11-14 RX ADMIN — ATORVASTATIN CALCIUM 80 MG: 80 TABLET, FILM COATED ORAL at 20:01

## 2023-11-14 ASSESSMENT — ACTIVITIES OF DAILY LIVING (ADL)
ADLS_ACUITY_SCORE: 22

## 2023-11-14 NOTE — PLAN OF CARE
Goal Outcome Evaluation:    Plan of Care Reviewed With: patient    Overall Patient Progress: no change Overall Patient Progress: no change    Outcome Evaluation: Patient's O2 sats have been in the mid 90s on 1/2 L O2 via nasal cannula. He denies shortness of breath. No dyspnea noted on exertion. He has multiple nonverbal indictors of anxiety, which per partially relieved with discussion and reassurance. He ambulated frequently in his room this evening with SBA.    Problem: Anxiety  Goal: Anxiety Reduction or Resolution  Outcome: Not Progressing     Problem: Heart Failure  Goal: Optimal Functional Ability  Outcome: Progressing    Problem: Heart Failure  Goal: Effective Oxygenation and Ventilation  Outcome: Progressing  Intervention: Promote Airway Secretion Clearance  Recent Flowsheet Documentation  Taken 11/13/2023 1640 by Verónica Lebron RN  Cough And Deep Breathing: done independently per patient  Activity Management: ambulated to bathroom

## 2023-11-14 NOTE — PLAN OF CARE
Problem: Adult Inpatient Plan of Care  Goal: Plan of Care Review  Description: The Plan of Care Review/Shift note should be completed every shift.  The Outcome Evaluation is a brief statement about your assessment that the patient is improving, declining, or no change.  This information will be displayed automatically on your shift  note.  Outcome: Progressing  Flowsheets (Taken 11/14/2023 1208)  Outcome Evaluation: Patients O2 saturations have been in the high 90's on room air. He denies any shortness of breath or dyspnea with exertion. Wife at bedside.  Plan of Care Reviewed With: patient  Overall Patient Progress: improving  Outcome: Progressing  Flowsheets (Taken 11/14/2023 1208)  Anxieties, Fears or Concerns: Anxiety related to oxygenation  Goal: Absence of Hospital-Acquired Illness or Injury  Outcome: Progressing  Intervention: Identify and Manage Fall Risk  Flowsheets  Taken 11/14/2023 1208  Safety Promotion/Fall Prevention:   clutter free environment maintained   lighting adjusted   mobility aid in reach   nonskid shoes/slippers when out of bed   patient and family education  Taken 11/14/2023 1133  Safety Promotion/Fall Prevention:   clutter free environment maintained   lighting adjusted   mobility aid in reach   nonskid shoes/slippers when out of bed   patient and family education  Intervention: Prevent Skin Injury  Flowsheets (Taken 11/14/2023 1208)  Body Position: position changed independently  Intervention: Prevent and Manage VTE (Venous Thromboembolism) Risk  Flowsheets (Taken 11/14/2023 1208)  VTE Prevention/Management: (Independently ambulating)   SCDs (sequential compression devices) off   other (see comments)  Intervention: Prevent Infection  Flowsheets (Taken 11/14/2023 1208)  Infection Prevention:   hand hygiene promoted   rest/sleep promoted  Goal: Optimal Comfort and Wellbeing  Outcome: Progressing  Intervention: Monitor Pain and Promote Comfort  Flowsheets (Taken 11/14/2023 1208)  Pain  Management Interventions: declines  Intervention: Provide Person-Centered Care  Flowsheets (Taken 11/14/2023 1208)  Trust Relationship/Rapport:   care explained   choices provided   emotional support provided   empathic listening provided   questions answered  Goal: Readiness for Transition of Care  Outcome: Progressing   Goal Outcome Evaluation:      Plan of Care Reviewed With: patient    Overall Patient Progress: improving    Outcome Evaluation: Patients O2 saturations have been in the high 90's on room air. He denies any shortness of breath or dyspnea with exertion. Wife at bedside.

## 2023-11-14 NOTE — PLAN OF CARE
"  Problem: Adult Inpatient Plan of Care  Goal: Plan of Care Review  Outcome: Progressing   Goal Outcome Evaluation:    Reason for Admission: Acute hypoxic respiratory failure and anxiety  Activity: SBA  Neuro: Alert to self, place, and situation. Disorientated to time.  Cardiac: Denies any chest pain or palpations.   Respiratory: Still has dyspnea with activity. Lung sounds are diminished. Pt's O2 sat 90 -94% on 0.5 lpm via NC, pt noted to frequently being taking off oxygen. Pt refused to test trial on room air during the night.   GI/: Pt used urinal at bedside.   Skin: Has scattered bruises and scabs.   Diet: 1800 ml fluid restriction; Pt has consumed approximately 280 ml as of 0600.  IV Access: Saline locked; 20 gauge right forearm.   Vitals: /62 (BP Location: Left arm, Patient Position: Semi-Velázquez's, Cuff Size: Adult Regular)   Pulse 72   Temp 98.3  F (36.8  C) (Oral)   Resp 18   Ht 1.702 m (5' 7.01\")   Wt 67.4 kg (148 lb 9.4 oz)   SpO2 92%   BMI 23.27 kg/m    Pain: Denies any pain.   Plan: Possible discharge home.       "

## 2023-11-14 NOTE — PROGRESS NOTES
[facility] Medicine Progress Note  Date of Service: 11/14/2023    Assessment & Plan           A 72 yo male with a history of HTN, HLD and multiple recent admissions: 10/26-11/3 with VF/VT cardiac arrest X2, anterior STEMI s/p PCI to LAD; readmission 11/6-11/12/23 with systolic CHF and respiratory failure 2/2 aspiration pneumonia. Now presents later on 11/12 for ongoing dyspnea.     Acute hypoxic respiratory failure, resolved  Multifactorial. Ongoing issue since MI Oct 26, 2023. EF is newly reduced following MI which is probably contributing. Long time smoker so probably a component of COPD although not previously diagnosed. TABATHA suspected based on nocturnal hypoxemia noted during previous admission. Also recently treated for aspiration pneumonia (currently on Augmentin) and probably has ongoing aspiration / related pneumonitis based on imaging.   Notably, CXR does look worse on presentation 11/12 compared to previous: increasing bilateral groundglass density infiltrates more apparent on right, likely representing infectious or inflammatory process. Increasing venous hypertension. Small bilateral pleural effusions, more apparent on left.   -No supplemental oxygen today and overnight, patient likely have sensation of dyspnea with   Brilinta. Discussed switching to Plavix, increasing PO lasix, and also starting medications to address underlying anxiety.   -Patient did not qualify for home O2 and nocturnal oximetry was hard to determine given 1/2 L O2 supplementation. Please avoid O2 even if patient requesting it.     Heart failure with reduced ejection fraction  Apical wall akinesis  BNP elevated from previous (92018 on 11/6, now 96830 on 11/12). CXR shows increasing venous hypertension and bilateral pleural effusions. Mild JVD but no edema. Near baseline weight (148lbs).  Echo from 10/30/23 showed EF 20-30% and apical wall akinesis; no LV thrombus. Per cardiology at [facility], he  willl need anticoagulation for 3 months. Managed PTA with furosemide 20mg daily, metoprolol, losartan. Patient with -2.6 L UOP yesterday.   -Started patient on increased dose of PO furosemide 40 mg daily (previously 20 mg)  -Continue PTA metoprolol, losartan with hold parameters  -Planning on C, RN, PT, OT with transition to cardiac rehab at a later date.     Recent pneumonia  Possible aspiration pneumonitis  Patient was suspected to have aspiration pneumonia during his Oct 2023 admission and completed a course of ceftriaxone. During admission 11/6-11/12 he was treated with Ampicillin-sulbactam, then discharged on Augmentin. Urine legionella, strep pneumo ag neg, MRSA swab neg 11/7/23. Bedside swallow without signs of aspiration, cannot find formal swallow eval. Covid, RSV, influenza A&B negative on admission 11/12/23. CXR 11/12 shows worsening groundglass infiltrates concerning for inflammatory process (infectious less likely as WBC is improving, no other signs of infection & infectious workup is unremarkable). Patient's swallow study with evidence of mild pharyngeal dysphagia characterized by reduced laryngeal vestibular closure and mild pharyngeal residue after the swallow, however no aspiration risk with any consistency.   -Continue Augmentin for 5 day course, improved leukocytosis and symptoms during hospitalization.     Mild DOMENICA  Baseline creatinine around 0.95, creatinine on presentation 1.31. Improved with diuresis in ER suggesting elevation was due to vascular congestion likely related to CHF. Slight bump in Cr with aggressive diuresis. Plan to switch to maintenance dosing today.   -Continue diuresis as per CHF, above  -BMP in AM     CAD, recent anterior STEMI s/p PCI/BRENDA to LAD (10/26/23)  Recent VT/VF cardiac arrest (10/27/23), NSV   Continue metoprolol and statin. Hospital course for MI complicated by VT/VF arrest, but completed amiodarone load for this prior to discharge & now in sinus rhythm. The  patient will f/u with cardiology at discharge with plan for cardiac MRI in 1 month. The patient was referred for cardiac rehab during this recent hospitalization.  There was concern regarding possible a-flutter during hospitalization so arranged for 30 day event monitor. Auscultated to be sinus rhythm on admission 11/13.  -Continue PTA amiodarone  -Continue PTA metoprolol  -Switching Brilinta to Plavix given side effect of dyspnea (~20% experience this side effect and is severe enough to be intolerable). Loaded with 300 mg today and then transition to 75 mg daily for total duration from PCI of 1 year.    Elevated troponin  Overall downtrending from MI 10/26/23, but now plateaued; since admission 358 <-- 330 <-- 317.   Suspect plateau elated to cardiac stress from increasing venous congestion & volume overload. No chest pain or other symptoms of ACS.   -Management of CHF, above    Suspected COPD  Nicotine dependence  Probably has a component of COPD given long time smoking history, but not previously diagnosed. Lungs with mild expiratory wheezing AM 11/13. Declines nicotine replacement. Strongly encouraged tobacco cessation.   -Albuterol available Q4hrs PRN     Anxiety  Panic disorder   Possible OCD  Insomnia  Psych consulted during [facility] admission 10/29 for increased anxiety after MI, recommended Klonopin; this has since been discontinued since patient did not like how it made him feel. Currently on prn trazodone and atarax.   Making comments 11/13 about 'enemies everywhere' and appears anxious.   -Continue trazodone & atarax  -Initiated on Lexapro 10 mg, discussed timing of maximal efficacy of 2-4 weeks prior to titration.     Moderate cognitive impairment  SLUMS 20/30 during recent previous hospitalization. Patient is a generally poor historian.     Intermittent hypotension  Asymptomatic, improved since metoprolol dose reduced from 25mg to 12.5mg daily during last hospitalization.     Suspected TABATHA  Patient was  noted to have nocturnal hypoxia at [facility] during his recent hospitalization and o/p sleep study was recommended.     Chest wall pain 2/2 recent CPR for VT/VF cardiac arrest  Pain is only with palpation of sternum & is improving. Does not sound like cardiac pain (no radiation, isolated to chest wall exacerbated with palpation).      Chronic anemia  Chronic, normocytic; baseline hemoglobin around 10.5, since admission 9.6 <-- 11.5. No signs of acute brisk bleeding.   -CBC in AM if remains in hospital overnight    Diet: Consistent Carbohydrate Diet Moderate Consistent Carb (60 g CHO per Meal) Diet  Fluid restriction 1800 ML FLUID (and additional linked orders)    DVT Prophylaxis: DOAC  Michael Catheter: Not present  Lines: None     Code Status: Full Code         Disposition: Patient's wife concerned since patient was placed on 0.5 L supplemental O2 that he may experience the anxiety and dyspnea again at discharge. Discussed additional night with recent changes to reassure patient and his wife that he does not require supplemental O2. Patient with 2 recent hospitalizations so given high risks of re-hospitalization would be reasonable to observe one more night.     Interval History   No acute events overnight. Patient was on 0.5 L NC this morning and satting 100%. Discussed changes made to his medications. Discussed that he should get up and walk around more today. Plan to discharge tomorrow morning.     Physical Exam   Temp:  [98.1  F (36.7  C)-99  F (37.2  C)] 98.3  F (36.8  C)  Pulse:  [67-78] 67  Resp:  [18-21] 18  BP: ()/(52-69) 94/57  SpO2:  [92 %-96 %] 92 %    Weights:   Vitals:    11/13/23 0400 11/14/23 0608   Weight: 67.4 kg (148 lb 9.4 oz) 67.4 kg (148 lb 9.4 oz)    Body mass index is 23.27 kg/m .    Constitutional: alert, laying in bed, appears comfortable, nontoxic  CV: regular rate & rhythm, no murmurs, rubs, or gallops.   Respiratory: Respirations unlabored. Course lung sounds in peripheral lung  fields. Mild expiratory wheezing with deep breaths. No rhonchi.   GI: Bowel sounds present throughout. Abdomen soft, nontender to palpation, non-distended.   Skin: Warm and dry  Musculoskeletal: muscle bulk as expected, no obvious joint deformities.   Neuro: distal sensation intact to lower extremities bilaterally, no tremor.     Data   Recent Labs   Lab 11/14/23  0454 11/13/23  0505 11/12/23  2245   WBC 7.0 10.6 15.5*   HGB 10.2* 9.6* 11.5*   MCV 98 98 97    398 511*    140 140   POTASSIUM 3.4 3.7 3.8   CHLORIDE 105 106 103   CO2 26 25 24   BUN 28.2* 21.2 19.3   CR 1.34* 1.19* 1.31*   ANIONGAP 12 9 13   PRANAV 8.7* 8.5* 9.1   GLC 90 96 147*   ALBUMIN  --  3.0* 3.7   PROTTOTAL  --  6.1* 7.4   BILITOTAL  --  0.3 0.4   ALKPHOS  --  99 121   ALT  --  56 69   AST  --  46* 53*     Imaging  No results found for this or any previous visit (from the past 24 hour(s)).     I reviewed all new labs and imaging results over the last 24 hours.    Medications    - MEDICATION INSTRUCTIONS -        amiodarone  200 mg Oral Daily    apixaban ANTICOAGULANT  5 mg Oral BID    atorvastatin  80 mg Oral Daily    cetirizine  10 mg Oral Daily    [START ON 11/15/2023] clopidogrel  75 mg Oral Daily    escitalopram  10 mg Oral Daily    furosemide  40 mg Oral Daily    losartan  12.5 mg Oral Daily    metoprolol succinate ER  12.5 mg Oral Daily    multivitamin w/minerals  1 tablet Oral Daily    vitamin C  1,000 mg Oral Daily     Vincent Bearden, [facility] Medicine

## 2023-11-14 NOTE — PROGRESS NOTES
Care Management Discharge Note    Discharge Date: 11/14/2023     Discharge Disposition: Home Care; [facility] (Phone: [phone]) RN, PT/OT & HHA    Discharge Services: None    Discharge DME:  None    Discharge Transportation: family or friend will provide    Private pay costs discussed: Not applicable    Does the patient's insurance plan have a 3 day qualifying hospital stay waiver?  No    PAS Confirmation Code:  NA  Patient/family educated on Medicare website which has current facility and service quality ratings: no    Education Provided on the Discharge Plan: Yes  Persons Notified of Discharge Plans: Patient  Patient/Family in Agreement with the Plan: yes    Handoff Referral Completed: Yes    Additional Information:  Per IDT rounds, MD states that pt is medically stable for discharge today.    Plan remains unchanged & pt will return to home with his spouse & [facility] (Phone: [phone]) for RN, PT/OT & HHA services.    Pt shared that his wife is having someone install a stair lift installed today or this week, but until then, he will remain on the main level of the home.     Transportation discussed and pt's spouse will transport home.    Follow up with PCP has been made & will show in AVS.      DMITRY Wright

## 2023-11-15 VITALS
DIASTOLIC BLOOD PRESSURE: 57 MMHG | TEMPERATURE: 98.5 F | SYSTOLIC BLOOD PRESSURE: 100 MMHG | BODY MASS INDEX: 23.32 KG/M2 | HEART RATE: 65 BPM | WEIGHT: 148.59 LBS | OXYGEN SATURATION: 96 % | RESPIRATION RATE: 16 BRPM | HEIGHT: 67 IN

## 2023-11-15 LAB
HOLD SPECIMEN: NORMAL
MAGNESIUM SERPL-MCNC: 2.2 MG/DL (ref 1.7–2.3)
POTASSIUM SERPL-SCNC: 3.9 MMOL/L (ref 3.4–5.3)

## 2023-11-15 PROCEDURE — 83735 ASSAY OF MAGNESIUM: CPT | Performed by: STUDENT IN AN ORGANIZED HEALTH CARE EDUCATION/TRAINING PROGRAM

## 2023-11-15 PROCEDURE — 36415 COLL VENOUS BLD VENIPUNCTURE: CPT | Performed by: STUDENT IN AN ORGANIZED HEALTH CARE EDUCATION/TRAINING PROGRAM

## 2023-11-15 PROCEDURE — 99239 HOSP IP/OBS DSCHRG MGMT >30: CPT | Performed by: STUDENT IN AN ORGANIZED HEALTH CARE EDUCATION/TRAINING PROGRAM

## 2023-11-15 PROCEDURE — 250N000013 HC RX MED GY IP 250 OP 250 PS 637

## 2023-11-15 PROCEDURE — 84132 ASSAY OF SERUM POTASSIUM: CPT | Performed by: STUDENT IN AN ORGANIZED HEALTH CARE EDUCATION/TRAINING PROGRAM

## 2023-11-15 PROCEDURE — 250N000013 HC RX MED GY IP 250 OP 250 PS 637: Performed by: STUDENT IN AN ORGANIZED HEALTH CARE EDUCATION/TRAINING PROGRAM

## 2023-11-15 PROCEDURE — 250N000013 HC RX MED GY IP 250 OP 250 PS 637: Performed by: INTERNAL MEDICINE

## 2023-11-15 RX ORDER — CLOPIDOGREL BISULFATE 75 MG/1
75 TABLET ORAL DAILY
Qty: 30 TABLET | Refills: 0 | Status: SHIPPED | OUTPATIENT
Start: 2023-11-15 | End: 2023-11-20

## 2023-11-15 RX ORDER — ESCITALOPRAM OXALATE 10 MG/1
10 TABLET ORAL DAILY
Qty: 30 TABLET | Refills: 0 | Status: SHIPPED | OUTPATIENT
Start: 2023-11-15 | End: 2023-12-04

## 2023-11-15 RX ORDER — FUROSEMIDE 40 MG
40 TABLET ORAL DAILY
Qty: 30 TABLET | Refills: 0 | Status: SHIPPED | OUTPATIENT
Start: 2023-11-15 | End: 2023-11-20

## 2023-11-15 RX ADMIN — CLOPIDOGREL BISULFATE 75 MG: 75 TABLET ORAL at 08:37

## 2023-11-15 RX ADMIN — APIXABAN 5 MG: 5 TABLET, FILM COATED ORAL at 08:37

## 2023-11-15 RX ADMIN — FUROSEMIDE 40 MG: 40 TABLET ORAL at 08:37

## 2023-11-15 RX ADMIN — METOPROLOL SUCCINATE 12.5 MG: 25 TABLET, EXTENDED RELEASE ORAL at 08:44

## 2023-11-15 RX ADMIN — AMIODARONE HYDROCHLORIDE 200 MG: 200 TABLET ORAL at 08:37

## 2023-11-15 RX ADMIN — CETIRIZINE HYDROCHLORIDE 10 MG: 10 TABLET, FILM COATED ORAL at 08:37

## 2023-11-15 RX ADMIN — ESCITALOPRAM OXALATE 10 MG: 10 TABLET ORAL at 08:37

## 2023-11-15 RX ADMIN — MULTIPLE VITAMINS W/ MINERALS TAB 1 TABLET: TAB at 08:37

## 2023-11-15 RX ADMIN — OXYCODONE HYDROCHLORIDE AND ACETAMINOPHEN 1000 MG: 500 TABLET ORAL at 08:37

## 2023-11-15 ASSESSMENT — ACTIVITIES OF DAILY LIVING (ADL)
ADLS_ACUITY_SCORE: 22

## 2023-11-15 NOTE — PLAN OF CARE
Problem: Risk for Delirium  Goal: Optimal Coping  Intervention: Optimize Psychosocial Adjustment to Delirium  Recent Flowsheet Documentation  Taken 11/14/2023 2359 by Vania Reyes RN  Supportive Measures:   active listening utilized   verbalization of feelings encouraged  Family/Support System Care: support provided     Problem: Anxiety  Goal: Anxiety Reduction or Resolution  Outcome: Progressing  Intervention: Promote Anxiety Reduction  Flowsheets (Taken 11/14/2023 2356)  Supportive Measures:   active listening utilized   verbalization of feelings encouraged  Complementary Therapy: other (see comments)  Family/Support System Care: support provided     Goal Outcome Evaluation:      Plan of Care Reviewed With: patient    Overall Patient Progress: improving    Patient's overall mood has improved by the end of this shift. Patient exhibited more understanding and less anxiety regarding his situation. This author sat down and spoke with patient throughout this shift to explain to him different ways he can manage his anxiety; educating on use of incentive spirometry, PRN medications, pursed-lip breathing, and aromatherapy. Patient voiced acceptance of all these things, aside from the aromatherapy as he says it does not work for him. By end of shift, patient was resting peacefully in his bed.

## 2023-11-15 NOTE — PLAN OF CARE
"  Problem: Anxiety  Goal: Anxiety Reduction or Resolution  Intervention: Promote Anxiety Reduction  Recent Flowsheet Documentation  Taken 11/15/2023 0100 by Schirmers, Mary, RN  Supportive Measures: active listening utilized   Goal Outcome Evaluation: Not progressing  Problem: Anxiety  Goal: Anxiety Reduction or Resolution  Intervention: Promote Anxiety Reduction  Recent Flowsheet Documentation  Taken 11/15/2023 0100 by Schirmers, Mary, RN  Supportive Measures: active listening utilized     Patient asked for \"something for anxiety.\"  Writer/Nurse brought in immediately Atarax ((ON MAR for \"ANXIETY\") Patient decided he did not want to take further pills.                      "

## 2023-11-15 NOTE — PROGRESS NOTES
Care Management Discharge Note    Discharge Date: 11/15/2023       Discharge Disposition: Home Care    Discharge Services: None    Discharge DME:  None    Discharge Transportation: family or friend will provide    Private pay costs discussed: Not applicable    Does the patient's insurance plan have a 3 day qualifying hospital stay waiver?  No    PAS Confirmation Code:  No  Patient/family educated on Medicare website which has current facility and service quality ratings: no    Education Provided on the Discharge Plan: Yes  Persons Notified of Discharge Plans: Patient  Patient/Family in Agreement with the Plan: yes    Handoff Referral Completed: Yes    Additional Information:  Per IDT rounds, MD states that pt is medically stable for discharge today.     Plan remains unchanged & pt will return to home with his spouse & [facility] (Phone: [phone]) for RN, PT/OT & HHA services.     Transportation discussed and pt's spouse will transport home.     Follow up with PCP has been made & will show in AVS.    DMITRY Wright

## 2023-11-15 NOTE — PLAN OF CARE
[place] [facility] DISCHARGE NOTE    Patient discharged to home at 11:34 AM via wheel chair. Accompanied by staff. Discharge instructions reviewed with patient, opportunity offered to ask questions. Prescriptions sent to patients preferred pharmacy. All belongings sent with patient.    Nellie Grant RN

## 2023-11-16 ENCOUNTER — PATIENT OUTREACH (OUTPATIENT)
Dept: CARE COORDINATION | Facility: CLINIC | Age: 73
End: 2023-11-16
Payer: MEDICARE

## 2023-11-16 ENCOUNTER — TELEPHONE (OUTPATIENT)
Dept: FAMILY MEDICINE | Facility: CLINIC | Age: 73
End: 2023-11-16
Payer: MEDICARE

## 2023-11-16 SDOH — HEALTH STABILITY: PHYSICAL HEALTH: ON AVERAGE, HOW MANY MINUTES DO YOU ENGAGE IN EXERCISE AT THIS LEVEL?: 0 MIN

## 2023-11-16 SDOH — HEALTH STABILITY: PHYSICAL HEALTH: ON AVERAGE, HOW MANY DAYS PER WEEK DO YOU ENGAGE IN MODERATE TO STRENUOUS EXERCISE (LIKE A BRISK WALK)?: 0 DAYS

## 2023-11-16 ASSESSMENT — ACTIVITIES OF DAILY LIVING (ADL): DEPENDENT_IADLS:: INDEPENDENT

## 2023-11-16 NOTE — TELEPHONE ENCOUNTER
Home Care is calling regarding an established patient with [facility].       Requesting orders from: Jose Coles  Provider is following patient: Yes  Is this a 60-day recertification request?  No    Orders Requested    Skilled Nursing  Request for initial certification (first set of orders)   Frequency:  1x/wk for 3 wks, then every other week x5 weeks    Physical Therapy  Request for initial evaluation and treatment (one time)   Verbal order given for the evaluation only    Social Work  Request for initial evaluation and treatment (one time)   For community resources, advance directive & long term planning    Verbal orders given for PT to evaluate.  Information was gathered for .  Provider review needed.  RN will contact Home Care with information after provider review.  Confirmed ok to leave a detailed message with call back.  Contact information confirmed and updated as needed.    Hayde Carvalho RN

## 2023-11-16 NOTE — PROGRESS NOTES
Clinic Care Coordination Contact  Clinic Care Coordination Contact  OUTREACH    Referral Information:  Referral Source: IP Handoff    Primary Diagnosis: CHF    Chief Complaint   Patient presents with    Clinic Care Coordination - Post Hospital     Clinic Care Coordination RN         Universal Utilization: Hospital admission 11/12-11/15/2023  Reason for your hospital stay  Dyspnea (likely medication side effect), however patient with  recent MI and resulting ischemic cardiomyopathy  Clinic Utilization  Difficulty keeping appointments:: No  Compliance Concerns: No  No-Show Concerns: No  No PCP office visit in Past Year: No  Utilization      No Show Count (past year)  1             ED Visits  3             Hospital Admissions  4                    Current as of: 11/16/2023  9:04 AM                Clinical Concerns:  Current Medical Concerns:  CC RN spoke to wife and she states the patient is having a home care RN visit right now.  Ml RN will follow up later today and see if there is any further questions     Current Behavioral Concerns: No    Education Provided to patient: CC introductory letter care plan and Heart action plan mailed       Health Maintenance Reviewed: Not assessed  Clinical Pathway: Clinic Care Coordination CHF Assessment    Discharge:      Day of hospital discharge: 11/15/23  What recommendations were made for follow up after your recent hospitalization? Added Plavix and Lexapro  Have the follow up appointments been scheduled? Yes  If not, can I help you set up these appointments? No       CHF:    Home scale available:  Yes  Home scale weight this morning: Didn't check weight today but will start checking daily   Heart Failure Zones sheet on refrigerator or available: No Mailed to patient   Any increased SOB since hospital discharge:  No  Any increased edema since hospital discharge:  No  What number to call for YELLOW zones: [phone]    Symptom Review:   Respiratory  Shortness of breath::  "Yes  Shortness of breath symptom course:: Stable  Waking up at night short of breath?: No  Shortness of breath with:: Activities of daily living  Prop up on pillows to breathe easier? : No  Wheezing or noisy breathing?: No  Cough: Yes  Is your cough:: Dry  Increased sputum: No  Fever  Fever: No  Cardiovascular  Chest pain: : No  Heartbeat: Regular  Heartbeat: Regular  Dizzy or Lightheaded: No  Swelling:: No  Bloating:: None  Fatigue: Yes  Weakness (Heaviness in limbs):: No  Diet/Education/Medication  Checking weight daily? : No  Medication adherence problem (GOAL):: No  Knowledgeable about how to use meds:: Yes  Medication side effects suspected:: No  Does the patient have understanding of Diuretic self-management?: Yes  Diet:: No added salt  GI/  Appetite:: Normal  Appetite:: Normal  Urination:: Normal  Urination:: Normal     Patient Perception of Heart Failure  What Heart Failure zone are you currently in?: Green  Overall your CHF symptoms are (GOAL):: Stable  How confident are you with the plan we have identified?: Somewhat confident    Medications:  \"How many new medications are you on since your hospitalization?\"  2 or more -- [facility] MTM referral needed  \"How many of your current medicines changed (dose, timing, name, etc.) while you were in the hospital?\"  2 or more -- [facility] MTM referral needed  \"Do you have questions about your medications?\"  No  Is Ejection Fraction <40%: No               Medication Management:  Medication review status: Medications reviewed.  Changes noted per patient report.         Living Situation:  Current living arrangement:: I live in a private home with spouse    Lifestyle & Psychosocial Needs:    Social Determinants of Health     Food Insecurity: Low Risk  (10/3/2023)    Food Insecurity     Within the past 12 months, did you worry that your food would run out before you got money to buy more?: No     Within the past 12 months, did the food you bought just not last and you didn t have " money to get more?: No   Depression: Not at risk (3/13/2023)    PHQ-2     PHQ-2 Score: 0   Housing Stability: Low Risk  (10/3/2023)    Housing Stability     Do you have housing? : Yes     Are you worried about losing your housing?: No   Tobacco Use: High Risk (11/7/2023)    Patient History     Smoking Tobacco Use: Every Day     Smokeless Tobacco Use: Never     Passive Exposure: Current   Financial Resource Strain: Low Risk  (10/3/2023)    Financial Resource Strain     Within the past 12 months, have you or your family members you live with been unable to get utilities (heat, electricity) when it was really needed?: No   Alcohol Use: Not on file   Transportation Needs: Low Risk  (10/3/2023)    Transportation Needs     Within the past 12 months, has lack of transportation kept you from medical appointments, getting your medicines, non-medical meetings or appointments, work, or from getting things that you need?: No   Physical Activity: Not on file   Interpersonal Safety: Low Risk  (10/9/2023)    Interpersonal Safety     Do you feel physically and emotionally safe where you currently live?: Yes     Within the past 12 months, have you been hit, slapped, kicked or otherwise physically hurt by someone?: No     Within the past 12 months, have you been humiliated or emotionally abused in other ways by your partner or ex-partner?: No   Stress: Not on file   Social Connections: Not on file              Mental health DX:: Yes  Mental health DX how managed:: Medication  Mental health management concern (GOAL):: No  Chemical Dependency Status: No Current Concerns  Informal Support system:: Spouse           Resources and Interventions:  Current Resources:   Skilled Home Care Services: Skilled Nursing, Home Health Aid, Physical Therapy, Occupational Therapy  Community Resources: Home Care                       Referrals Placed: None         Care Plan:  Care Plan: Heart Failure       Problem: Heart Failure Management Needs  Improvement       Goal: Demonstrate improved heart failure management       Start Date: 11/16/2023 Expected End Date: 1/16/2024    This Visit's Progress: 30%    Priority: High    Note:     Barriers: Deconditioned   Strengths: supportive wife   Patient expressed understanding of goal: Yes   Action steps to achieve this goal:  1. I will check my weight daily and call if weight gain is 2-3# in a day or 5# in a week,increased shortness of breath episodes or leg swelling   2. I will take my medications as directed and keep future provider appointments   3. I will keep future Cardiac Rehab appointments   4. I will continue home care services                                Patient/Caregiver understanding: Patient and wife express good understanding of discharge instructions     Outreach Frequency: weekly, more frequently as needed  Future Appointments                In 4 days  CRITICAL CARE [facility]    In 1 week Katiana Reyes APRN CNP [facility]    In 2 weeks [facility] 2 [facility]    In 3 months Steve Dixon DO [facility]            Plan:   Patient will keep hospital follow up 11/24/2023 and CR 12/6/2023  CC RN will follow up in 3-5 business days     [facility]   Ruthy Paiz RN, Care Coordinator   [facility]'s   E-mail [email or web address]   [phone]

## 2023-11-16 NOTE — LETTER
[facility] CARE COORDINATION  [address] [facility] [address]     November 16, 2023    Duane C Johnson  [address] [facility] [address]      Dear Duane,    I am a clinic care coordinator who works with Jose Coles MD with the [facility]. I wanted to thank you for spending the time to talk with me.  Below is a description of clinic care coordination and how I can further assist you.       The clinic care coordination team is made up of a registered nurse, , financial resource worker and community health worker who understand the health care system. The goal of clinic care coordination is to help you manage your health and improve access to the health care system. Our team works alongside your provider to assist you in determining your health and social needs. We can help you obtain health care and community resources, providing you with necessary information and education. We can work with you through any barriers and develop a care plan that helps coordinate and strengthen the communication between you and your care team.  Our services are voluntary and are offered without charge to you personally.    Please feel free to contact me with any questions or concerns regarding care coordination and what we can offer.      We are focused on providing you with the highest-quality healthcare experience possible.    Sincerely,     [facility]   Ruthy Paiz RN, Care Coordinator   [facility]'s   E-mail [email or web address]   [phone]     Enclosed: I have enclosed a copy of the Patient Centered Plan of Care. This has helpful information and goals that we have talked about. Please keep this in an easy to access place to use as needed. and Heart Failure Action Plan

## 2023-11-16 NOTE — LETTER
[facility]  Patient Centered Plan of Care  About Me:        Patient Name:  Duane C Johnson    YOB: 1950  Age:         73 year old   Stacia MRN:    [id number] Telephone Information:  Home Phone [phone]   Mobile [phone]       Address:  [address] [address] [place] [address] Email address:  [email or web address]      Emergency Contact(s)    Name Relationship Lgl Grd Work Phone Home Phone Mobile Phone   PARADISE TAVARES Spouse   [phone]            Primary language:  English     needed? No   [employer] [employer]:  [phone] op. 1  Other communication barriers:None    Preferred Method of Communication:  Mail  Current living arrangement: I live in a private home with spouse    Mobility Status/ Medical Equipment: Independent        Health Maintenance  Health Maintenance Reviewed:   Health Maintenance Due   Topic Date Due    HF ACTION PLAN  Never done    TSH W/FREE T4 REFLEX  Never done    HEPATITIS C SCREENING  Never done    ZOSTER IMMUNIZATION (1 of 2) Never done    RSV VACCINE (Pregnancy & 60+) (1 - 1-dose 60+ series) Never done    DTAP/TDAP/TD IMMUNIZATION (2 - Td or Tdap) 01/23/2018          My Access Plan  Medical Emergency 911   Primary Clinic Line [facility] - [phone]   24 Hour Appointment Line [phone] or  [phone]) (toll-free)   24 Hour Nurse Line [phone] (toll-free)   Preferred Urgent Care [facility], [phone]     Preferred Hospital [place], [place]  [phone]     Preferred Pharmacy [facility] [place], [place] - [address] [place] [address]     Behavioral Health Crisis Line The National Suicide Prevention Lifeline at 1-223.576.9826 or Text/Call 510           My Care Team Members  Patient Care Team         Relationship Specialty Notifications Start End    Jose Coles MD PCP - General Family Practice  4/22/19     Phone:  [phone] Fax: [phone]         [address] [place] [address]    Davey Randolph MD Assigned PCP   3/25/23     Phone: [phone] Fax: [phone]         [address] [place] [address]    Aranza Jiménez PA-C Physician Assistant Urology  4/7/23     Phone: [phone] Fax: [phone]         [address] [facility] [address]    Aranza Jiménez PA-C Assigned Surgical Provider   5/6/23     Phone: [phone] Fax: [phone]         [address] [facility] [address]    Blas Causey MD Assigned Cancer Care Provider   9/23/23     Phone: [phone] Pager: [phone] Fax: [phone]        [address] [place] [address]    Davey Randolph MD Assigned Pain Medication Provider   10/28/23     Phone: [phone] Fax: [phone]         [address] [place] [address]    Ruthy Paiz RN Lead Care Coordinator Primary Care - CC Admissions 11/6/23     Phone: [phone]                     My Care Plans  Self Management and Treatment Plan    Care Plan  Care Plan: Heart Failure       Problem: Heart Failure Management Needs Improvement       Goal: Demonstrate improved heart failure management       Start Date: 11/16/2023 Expected End Date: 1/16/2024    This Visit's Progress: 30%    Priority: High    Note:     Barriers: Deconditioned   Strengths: supportive wife   Patient expressed understanding of goal: Yes   Action steps to achieve this goal:  1. I will check my weight daily and call if weight gain is 2-3# in a day or 5# in a week,increased shortness of breath episodes or leg swelling   2. I will take my medications as directed and keep future provider appointments   3. I will keep future Cardiac Rehab appointments   4. I will continue home care services                                Action Plans on File:                       Advance Care Plans/Directives:   Advanced Care Plan/Directives on file:   No    Discussed with patient/caregiver(s): No  data recorded           My Medical and Care Information  Problem List   Patient Active Problem List   Diagnosis    Hyperlipidemia LDL goal <130    Prostate cancer (H)    ST elevation MI (STEMI) (H)    Tobacco dependence syndrome    Adiposity    Acute respiratory failure with hypoxia (H)    Multifocal pneumonia    Elevated brain natriuretic peptide (BNP) level    Primary hypertension    SOB (shortness of breath)    Coronary artery disease due to calcified coronary lesion    Pneumonia of left upper lobe due to infectious organism    Systolic CHF (H)    Hx of cardiac arrest    Anxiety    Congestive heart failure, unspecified HF chronicity, unspecified heart failure type (H)    Acute on chronic systolic congestive heart failure (H)    ST elevation myocardial infarction involving left anterior descending (LAD) coronary artery (H)      Current Medications and Allergies:    Current Outpatient Medications   Medication    acetaminophen (TYLENOL) 325 MG tablet    amiodarone (PACERONE) 200 MG tablet    apixaban ANTICOAGULANT (ELIQUIS) 5 MG tablet    atorvastatin (LIPITOR) 80 MG tablet    cetirizine (ZYRTEC) 10 MG tablet    clopidogrel (PLAVIX) 75 MG tablet    escitalopram (LEXAPRO) 10 MG tablet    fish oil-omega-3 fatty acids 1000 MG capsule    furosemide (LASIX) 40 MG tablet    hydrOXYzine (ATARAX) 10 MG tablet    losartan (COZAAR) 25 MG tablet    melatonin 10 MG TABS tablet    metoprolol succinate ER (TOPROL XL) 25 MG 24 hr tablet    multivitamin w/minerals (THERA-VIT-M) tablet    traZODone (DESYREL) 50 MG tablet    vitamin C (ASCORBIC ACID) 1000 MG TABS     No current facility-administered medications for this visit.        Care Coordination Start Date: 11/6/2023   Frequency of Care Coordination: weekly, more frequently as needed     Form Last Updated: 11/16/2023

## 2023-11-20 ENCOUNTER — OFFICE VISIT (OUTPATIENT)
Dept: CARDIOLOGY | Facility: CLINIC | Age: 73
End: 2023-11-20
Attending: INTERNAL MEDICINE
Payer: MEDICARE

## 2023-11-20 ENCOUNTER — PRE VISIT (OUTPATIENT)
Dept: CARDIOLOGY | Facility: CLINIC | Age: 73
End: 2023-11-20
Payer: MEDICARE

## 2023-11-20 ENCOUNTER — TELEPHONE (OUTPATIENT)
Dept: CARDIOLOGY | Facility: CLINIC | Age: 73
End: 2023-11-20

## 2023-11-20 ENCOUNTER — LAB (OUTPATIENT)
Dept: LAB | Facility: CLINIC | Age: 73
End: 2023-11-20
Payer: MEDICARE

## 2023-11-20 VITALS
HEIGHT: 67 IN | HEART RATE: 54 BPM | WEIGHT: 143.5 LBS | OXYGEN SATURATION: 97 % | DIASTOLIC BLOOD PRESSURE: 60 MMHG | SYSTOLIC BLOOD PRESSURE: 96 MMHG | BODY MASS INDEX: 22.52 KG/M2

## 2023-11-20 DIAGNOSIS — I46.9 CARDIAC ARREST (H): ICD-10-CM

## 2023-11-20 DIAGNOSIS — Z11.59 NEED FOR HEPATITIS C SCREENING TEST: Primary | ICD-10-CM

## 2023-11-20 DIAGNOSIS — I50.20 HEART FAILURE WITH REDUCED EJECTION FRACTION, NYHA CLASS I (H): Primary | ICD-10-CM

## 2023-11-20 DIAGNOSIS — I50.20 HEART FAILURE WITH REDUCED EJECTION FRACTION, NYHA CLASS I (H): ICD-10-CM

## 2023-11-20 DIAGNOSIS — I21.02 ST ELEVATION MYOCARDIAL INFARCTION INVOLVING LEFT ANTERIOR DESCENDING (LAD) CORONARY ARTERY (H): ICD-10-CM

## 2023-11-20 DIAGNOSIS — I50.21 ACUTE SYSTOLIC HEART FAILURE (H): ICD-10-CM

## 2023-11-20 DIAGNOSIS — I21.3 ST ELEVATION MYOCARDIAL INFARCTION (STEMI), UNSPECIFIED ARTERY (H): ICD-10-CM

## 2023-11-20 LAB
ANION GAP SERPL CALCULATED.3IONS-SCNC: 8 MMOL/L (ref 7–15)
BUN SERPL-MCNC: 16.9 MG/DL (ref 8–23)
CALCIUM SERPL-MCNC: 8.9 MG/DL (ref 8.8–10.2)
CHLORIDE SERPL-SCNC: 100 MMOL/L (ref 98–107)
CREAT SERPL-MCNC: 1.12 MG/DL (ref 0.67–1.17)
DEPRECATED HCO3 PLAS-SCNC: 29 MMOL/L (ref 22–29)
EGFRCR SERPLBLD CKD-EPI 2021: 69 ML/MIN/1.73M2
ERYTHROCYTE [DISTWIDTH] IN BLOOD BY AUTOMATED COUNT: 13.9 % (ref 10–15)
GLUCOSE SERPL-MCNC: 91 MG/DL (ref 70–99)
HCT VFR BLD AUTO: 36.7 % (ref 40–53)
HGB BLD-MCNC: 12.3 G/DL (ref 13.3–17.7)
MCH RBC QN AUTO: 32.3 PG (ref 26.5–33)
MCHC RBC AUTO-ENTMCNC: 33.5 G/DL (ref 31.5–36.5)
MCV RBC AUTO: 96 FL (ref 78–100)
NT-PROBNP SERPL-MCNC: 8049 PG/ML (ref 0–900)
PLATELET # BLD AUTO: 329 10E3/UL (ref 150–450)
POTASSIUM SERPL-SCNC: 4.1 MMOL/L (ref 3.4–5.3)
RBC # BLD AUTO: 3.81 10E6/UL (ref 4.4–5.9)
SODIUM SERPL-SCNC: 137 MMOL/L (ref 135–145)
WBC # BLD AUTO: 6.4 10E3/UL (ref 4–11)

## 2023-11-20 PROCEDURE — 83880 ASSAY OF NATRIURETIC PEPTIDE: CPT | Performed by: PATHOLOGY

## 2023-11-20 PROCEDURE — 85027 COMPLETE CBC AUTOMATED: CPT | Performed by: PATHOLOGY

## 2023-11-20 PROCEDURE — 80048 BASIC METABOLIC PNL TOTAL CA: CPT | Performed by: PATHOLOGY

## 2023-11-20 PROCEDURE — 99205 OFFICE O/P NEW HI 60 MIN: CPT

## 2023-11-20 PROCEDURE — 36415 COLL VENOUS BLD VENIPUNCTURE: CPT | Performed by: PATHOLOGY

## 2023-11-20 PROCEDURE — 99000 SPECIMEN HANDLING OFFICE-LAB: CPT | Performed by: PATHOLOGY

## 2023-11-20 PROCEDURE — G0463 HOSPITAL OUTPT CLINIC VISIT: HCPCS

## 2023-11-20 PROCEDURE — 86803 HEPATITIS C AB TEST: CPT | Performed by: FAMILY MEDICINE

## 2023-11-20 RX ORDER — METOPROLOL SUCCINATE 25 MG/1
12.5 TABLET, EXTENDED RELEASE ORAL DAILY
Qty: 60 TABLET | Refills: 3 | Status: SHIPPED | OUTPATIENT
Start: 2023-11-20 | End: 2024-01-15

## 2023-11-20 RX ORDER — FUROSEMIDE 40 MG
40 TABLET ORAL DAILY
Qty: 30 TABLET | Refills: 0 | Status: SHIPPED | OUTPATIENT
Start: 2023-11-20 | End: 2023-11-21

## 2023-11-20 RX ORDER — CLOPIDOGREL BISULFATE 75 MG/1
75 TABLET ORAL DAILY
Qty: 30 TABLET | Refills: 3 | Status: SHIPPED | OUTPATIENT
Start: 2023-11-20 | End: 2024-04-08

## 2023-11-20 RX ORDER — LOSARTAN POTASSIUM 25 MG/1
12.5 TABLET ORAL DAILY
Qty: 90 TABLET | Refills: 3 | Status: SHIPPED | OUTPATIENT
Start: 2023-11-20 | End: 2024-01-10 | Stop reason: ALTCHOICE

## 2023-11-20 ASSESSMENT — PAIN SCALES - GENERAL: PAINLEVEL: NO PAIN (0)

## 2023-11-20 NOTE — TELEPHONE ENCOUNTER
[facility] Call [facility]    Phone Message    May a detailed message be left on voicemail: yes     Reason for Call: Other: Please call pt to schedule appt. There is a hold spot for Dec 15. Thank you     Action Taken: Message routed to:  Other: Cardiology    Travel Screening: Not Applicable      Thank you!  Specialty Access Center

## 2023-11-20 NOTE — DISCHARGE SUMMARY
[facility]  Hospitalist Discharge Summary      Date of Admission:  11/12/2023  Date of Discharge:  11/15/2023 11:48 AM  Discharging Provider: Vincent Mcbride DO  Discharge Service: Hospitalist Service    Discharge Diagnoses   Acute hypoxic respiratory failure, resolved  Heart failure with reduced ejection fraction (EF 20-30%)  Recent hx of CAP  DOMENICA, KDIGO stage 1  CAD s/p PCI to LAD (10/26/23)  Recent VT/VF cardiac arrest (10/27/23)  NSTEMI type II  COPD  Tobacco use disorder   Anxiety  Medication side effect  Insomnia   Moderate cognitive impairment  Chronic anemia     Clinically Significant Risk Factors          Follow-ups Needed After Discharge   Follow-up Appointments     Follow-up and recommended labs and tests       Follow up with primary care provider, Jose Coles, within 7 days to   evaluate medication change and for hospital follow- up.  No follow up labs   or test are needed.          Discharge Disposition   Discharged to home  Condition at discharge: Stable    Hospital Course   Duane Johnson is a 72 yo male with a history of HTN, HLD and multiple recent admissions: 10/26-11/3 with VF/VT cardiac arrest X2, anterior STEMI s/p PCI to LAD; readmission 11/6-11/12/23 with systolic CHF and respiratory failure 2/2 aspiration pneumonia. Now presents later on 11/12 for ongoing dyspnea.      Acute hypoxic respiratory failure, resolved  Multifactorial. Ongoing issue since MI Oct 26, 2023. EF is newly reduced following MI which is probably contributing. Long time smoker so probably a component of COPD although not previously diagnosed. TABATHA suspected based on nocturnal hypoxemia noted during previous admission. Also recently treated for aspiration pneumonia (finished course of Augmentin).  Notably, CXR does look worse on presentation 11/12 compared to previous: increasing bilateral groundglass density infiltrates more apparent on right, likely representing infectious or inflammatory process.  Increasing venous hypertension. Small bilateral pleural effusions, more apparent on left.   Patient likely experiencing dyspnea from Brilinta (known side effect that can affect ~20% of patients). Patient was switched off Brilinta to Plavix and patient's symptoms resolved. We continued to observe the patient overnight given recent admissions and patient had no additional hypoxia or sensations of hypoxia. Exercise and nocturnal oximetry were negative for requiring supplemental oxygen.   - Continue Plavix 75 mg daily  - Increased Furosemide dose from 20 mg to 40 mg, discussed weight gain of greater than 5 lbs and would recommend patient call his cardiology or PCP office for additional instructions for diuretic adjustments.      Heart failure with reduced ejection fraction  Apical wall akinesis  BNP elevated from previous (11,149 on 11/6, now 16,038 on 11/12). CXR shows increasing venous hypertension and bilateral pleural effusions. Mild JVD but no edema. Near baseline weight (148lbs).  Echo from 10/30/23 showed EF 20-30% and apical wall akinesis; no LV thrombus. Per cardiology at [facility], he willl need anticoagulation for 3 months. Managed PTA with furosemide 20mg daily, metoprolol, losartan. Patient with -2.6 L UOP yesterday.   -Continue on increased dose of PO furosemide 40 mg daily (previously 20 mg)  -Continue PTA metoprolol, losartan with hold parameters  -Continue Apixaban 5 mg for 3 months and will need follow-up with cardiology for apical wall hypokinesis.   -Planning on [facility], RN, PT, OT with transition to cardiac rehab at a later date.      Recent pneumonia  Possible aspiration pneumonitis  Patient was suspected to have aspiration pneumonia during his Oct 2023 admission and completed a course of ceftriaxone. During admission 11/6-11/12 he was treated with Ampicillin-sulbactam, then discharged on Augmentin. Urine legionella, strep pneumo ag neg, MRSA swab neg 11/7/23. Bedside swallow without signs of aspiration,  cannot find formal swallow eval. Covid, RSV, influenza A&B negative on admission 11/12/23. CXR 11/12 shows worsening groundglass infiltrates concerning for inflammatory process (infectious less likely as WBC is improving, no other signs of infection & infectious workup is unremarkable). Patient's swallow study with evidence of mild pharyngeal dysphagia characterized by reduced laryngeal vestibular closure and mild pharyngeal residue after the swallow, however no aspiration risk with any consistency.   -Finished course of Augmentin (5 day course), improved leukocytosis and symptoms during hospitalization.      Mild DOMENICA, resolved  Baseline creatinine around 0.95, creatinine on presentation 1.31. Improved with diuresis in ER suggesting elevation was due to vascular congestion likely related to CHF. Slight bump in Cr with aggressive diuresis. Plan to switch to maintenance dosing today.      CAD, recent anterior STEMI s/p PCI/BRENDA to LAD (10/26/23)  Recent VT/VF cardiac arrest (10/27/23), NSV   Continue metoprolol and statin. Hospital course for MI complicated by VT/VF arrest, but completed amiodarone load for this prior to discharge & now in sinus rhythm. The patient will f/u with cardiology at discharge with plan for cardiac MRI in 1 month. The patient was referred for cardiac rehab during this recent hospitalization.  There was concern regarding possible a-flutter during hospitalization so arranged for 30 day event monitor. Auscultated to be sinus rhythm on admission 11/13.  -Continue PTA amiodarone  -Continue PTA metoprolol  -See above for full details of switching Brilinta to Plavix.     NSTEMI type II  Overall downtrending from MI 10/26/23, but now plateaued; since admission 358 <-- 330 <-- 317.   Suspect plateau elated to cardiac stress from increasing venous congestion & volume overload. No chest pain or other symptoms of ACS.      Suspected COPD  Nicotine dependence  Probably has a component of COPD given long  time smoking history, but not previously diagnosed. Lungs with mild expiratory wheezing AM 11/13. Declines nicotine replacement. Strongly encouraged tobacco cessation.      Anxiety  Panic disorder   Possible OCD  Insomnia  Psych consulted during [facility] admission 10/29 for increased anxiety after MI, recommended Klonopin; this has since been discontinued since patient did not like how it made him feel. Currently on prn trazodone and atarax.   Making comments 11/13 about 'enemies everywhere' and appears anxious.   -Continue trazodone & atarax  -Initiated on Lexapro 10 mg, discussed timing of maximal efficacy of 2-4 weeks prior to titration. Will need ongoing management as an outpatient to maximize efficacy and dosage.     Moderate cognitive impairment  SLUMS 20/30 during recent previous hospitalization. Patient is a generally poor historian.      Intermittent hypotension  Asymptomatic, improved since metoprolol dose reduced from 25mg to 12.5mg daily during last hospitalization.      Suspected TABATHA  Patient was noted to have nocturnal hypoxia at [facility] during his recent hospitalization and o/p sleep study was recommended.      Chest wall pain 2/2 recent CPR for VT/VF cardiac arrest  Pain is only with palpation of sternum & is improving. Does not sound like cardiac pain (no radiation, isolated to chest wall exacerbated with palpation).      Chronic anemia  Chronic, normocytic; baseline hemoglobin around 10.5, since admission 9.6 <-- 11.5. No signs of acute brisk bleeding. Continued apixaban 5 mg BID.  - Recommend repeat CBC at PCP follow-up    Consultations This Hospital Stay   CORE CLINIC EVALUATION IP CONSULT  OCCUPATIONAL THERAPY ADULT IP CONSULT  NUTRITION SERVICES ADULT IP CONSULT  CARE MANAGEMENT / SOCIAL WORK IP CONSULT  SPEECH LANGUAGE PATH ADULT IP CONSULT  CARE MANAGEMENT / SOCIAL WORK IP CONSULT    Code Status   Prior    Time Spent on this Encounter   Vincent CURIEL DO, personally saw the patient today and  spent greater than 30 minutes discharging this patient.       Vincent Mcbride [facility] MEDICAL SURGICAL  [address] [facility] [address]  Phone: [phone]  Fax: [phone]  ______________________________________________________________________    Physical Exam   Vital Signs:                    Weight: 148 lbs 9.44 oz    Constitutional: alert, laying in bed, appears comfortable, nontoxic  CV: regular rate & rhythm, no murmurs, rubs, or gallops.   Respiratory: Respirations unlabored. Course lung sounds in peripheral lung fields. Mild expiratory wheezing with deep breaths. No rhonchi.   GI: Bowel sounds present throughout. Abdomen soft, nontender to palpation, non-distended.   Skin: Warm and dry  Musculoskeletal: muscle bulk as expected, no obvious joint deformities.   Neuro: distal sensation intact to lower extremities bilaterally, no tremor.    Primary Care Physician   Jose Coles    Discharge Orders      Home Care Referral      Primary Care - Care Coordination Referral      Cardiac Rehab Referral      Home Care Referral      Primary Care - Care Coordination Referral      Resume Home Care Services     Reason for your hospital stay    Dyspnea (likely medication side effect), however patient with recent MI and resulting ischemic cardiomyopathy.     Follow-up and recommended labs and tests     Follow up with primary care provider, Jose Coles, within 7 days to evaluate medication change and for hospital follow- up.  No follow up labs or test are needed.     Activity    Your activity upon discharge: activity as tolerated     Diet    Follow this diet upon discharge:     Fluid restriction 1800 ML FLUID      Consistent Carbohydrate Diet Moderate Consistent Carb (60 g CHO per Meal) Diet       Significant Results and Procedures   Most Recent 3 CBC's:  Recent Labs   Lab Test 11/14/23  0454 11/13/23  0505 11/12/23  2245   WBC 7.0 10.6 15.5*   HGB 10.2* 9.6* 11.5*   MCV 98 98 97    398  511*     Most Recent 3 BMP's:  Recent Labs   Lab Test 11/15/23  0458 11/14/23  1223 11/14/23  0454 11/13/23  0505 11/12/23  2245   NA  --   --  143 140 140   POTASSIUM 3.9 3.8 3.4 3.7 3.8   CHLORIDE  --   --  105 106 103   CO2  --   --  26 25 24   BUN  --   --  28.2* 21.2 19.3   CR  --   --  1.34* 1.19* 1.31*   ANIONGAP  --   --  12 9 13   PRANAV  --   --  8.7* 8.5* 9.1   GLC  --   --  90 96 147*       Discharge Medications   Discharge Medication List as of 11/15/2023  9:59 AM        START taking these medications    Details   clopidogrel (PLAVIX) 75 MG tablet Take 1 tablet (75 mg) by mouth daily for 30 days, Disp-30 tablet, R-0, E-Prescribe      escitalopram (LEXAPRO) 10 MG tablet Take 1 tablet (10 mg) by mouth daily for 30 days, Disp-30 tablet, R-0, E-Prescribe           CONTINUE these medications which have CHANGED    Details   furosemide (LASIX) 40 MG tablet Take 1 tablet (40 mg) by mouth daily for 30 days, Disp-30 tablet, R-0, E-Prescribe           CONTINUE these medications which have NOT CHANGED    Details   acetaminophen (TYLENOL) 325 MG tablet Take 2 tablets (650 mg) by mouth every 6 hours as needed, Historical      amiodarone (PACERONE) 200 MG tablet Take 1 tablet (200 mg) by mouth daily, Disp-90 tablet, R-0, E-Prescribe      apixaban ANTICOAGULANT (ELIQUIS) 5 MG tablet Take 1 tablet (5 mg) by mouth 2 times daily, Disp-180 tablet, R-0, E-Prescribe      atorvastatin (LIPITOR) 80 MG tablet Take 1 tablet (80 mg) by mouth daily, Disp-90 tablet, R-3, Transitional      cetirizine (ZYRTEC) 10 MG tablet Take 10 mg by mouth daily, Historical      fish oil-omega-3 fatty acids 1000 MG capsule Take 1 g by mouth 2 times daily Also contains another supplement, Historical      hydrOXYzine (ATARAX) 10 MG tablet Take 1 tablet (10 mg) by mouth every 8 hours as needed for anxiety, Disp-30 tablet, R-0, E-Prescribe      losartan (COZAAR) 25 MG tablet Take 0.5 tablets (12.5 mg) by mouth daily, Disp-90 tablet, R-0, E-Prescribe       melatonin 10 MG TABS tablet Take 1 tablet (10 mg) by mouth at bedtime, Transitional      metoprolol succinate ER (TOPROL XL) 25 MG 24 hr tablet Take 0.5 tablets (12.5 mg) by mouth daily, Disp-60 tablet, R-0, E-Prescribe      multivitamin w/minerals (THERA-VIT-M) tablet Take 1 tablet by mouth daily, Historical      traZODone (DESYREL) 50 MG tablet Take 0.5 tablets (25 mg) by mouth nightly as needed for sleep, Disp-30 tablet, R-0, E-Prescribe      vitamin C (ASCORBIC ACID) 1000 MG TABS Take 1,000 mg by mouth daily, Historical           STOP taking these medications       amoxicillin-clavulanate (AUGMENTIN) 875-125 MG tablet Comments:   Reason for Stopping:         ticagrelor (BRILINTA) 90 MG tablet Comments:   Reason for Stopping:             Allergies   Allergies   Allergen Reactions    Clonazepam Other (See Comments)     Per spouse, acted like a zombie and he was shaky, could barely talk.

## 2023-11-20 NOTE — PROGRESS NOTES
[facility]  11/20/2023        History of Presenting Illness:  Duane C Johnson is a  74 yo  with a hx of anterior STEMI s.p PCI to the pLAD on 10/26 with a VT/VF arrest the next day (10/27) with patent stents and unchanged anatomy on repeat angiogram. Placed on amiodarone and discharged 11/03/23, ICD was not indicated as this was within 48h of his MI. His EF prior do discharge was 20-30% with a large area of akinesis in the LAD placed on apixaban due to this.     Since then he has been admitted 11/6-20 with AHFD and treated with IV lasix and unasyn. ?Aflutter in that hospitalization discharged with an event monitor that has not been completed. He had some lightheadedness and his metoprolol dose was adjusted. He represented and they changed his brillinta to the plavix. Since then he has had no further issues with shortness of breath. He tells me that he does not taste buds is eating but less than usual and tells me he has been losing weight.     He has been walking twice a day. He has not tolerated his metoprolol and his losartan given his SBP is <110mmHg and therefore has not taken it. They take his bp at least once a day bp~/60's with hr in the 50's. He has not had lightheadedness or dizziness.     He stopped smoking oct 27th. His labs today show a bicarb of 29 and SCr stable 1.12 normal CBC.    Home medications:   Eliquis 5 mg BID  Clopidogrel 75mg/d  toprol 12.5 mg daily-is only given when SBP>110mmHg   Losartan 12.5 mg daily- is only given when SBP>110mmHg   Amiodarone 200 mg daily  Melatonin 10 mg at bedtime  lipitor 80 mg daily  Lasix 40mg/d       Past Medical History:  Prostate ca- was going to have prostatectomy the day after the STEMI-   Hernia    Family History:  No family hx of CAD or SCD    Social History:  Ex smoker     ROS:  A complete 10-point ROS was negative except as above.    Physical Exam:  BP 96/60 (BP Location: Right arm, Patient Position: Sitting, Cuff Size:  "Adult Regular)   Pulse 54   Ht 1.702 m (5' 7.01\")   Wt 65.1 kg (143 lb 8 oz)   SpO2 97%   BMI 22.47 kg/m      Gen: alert, interactive, NAD, wlaking with a cane (new since his STEMI)  HEENT: atraumatic, EOMI, MMM  Neck: supple, no JVP elevation appreciated.  CV: RRR, no murmurs, rubs.  Chest: CTAB, no wheezes or crackles  Abd: soft, NT, ND, +BS  Ext: no LE edema, 2+ peripheral pulses  Skin: warm and dry, no rashes on exposed surfaces  Neuro: alert and oriented, no focal deficits    Labs:   Labs and cardiac data reviewed personally in clinic.    CMP:  Sodium   Date Value Ref Range Status   11/14/2023 143 135 - 145 mmol/L Final     Comment:     Reference intervals for this test were updated on 09/26/2023 to more accurately reflect our healthy population. There may be differences in the flagging of prior results with similar values performed with this method. Interpretation of those prior results can be made in the context of the updated reference intervals.    05/26/2021 141 133 - 144 mmol/L Final     Sodium Whole Blood   Date Value Ref Range Status   10/27/2023 138 135 - 145 mmol/L Final     Potassium   Date Value Ref Range Status   11/15/2023 3.9 3.4 - 5.3 mmol/L Final   05/26/2021 4.5 3.4 - 5.3 mmol/L Final     Potassium Whole Blood   Date Value Ref Range Status   10/27/2023 4.1 3.4 - 5.3 mmol/L Final     Chloride   Date Value Ref Range Status   11/14/2023 105 98 - 107 mmol/L Final   05/26/2021 105 94 - 109 mmol/L Final     Chloride Whole Blood   Date Value Ref Range Status   10/27/2023 103 94 - 109 mmol/L Final     Carbon Dioxide   Date Value Ref Range Status   05/26/2021 29 20 - 32 mmol/L Final     Carbon Dioxide (CO2)   Date Value Ref Range Status   11/14/2023 26 22 - 29 mmol/L Final     Carbon Dioxide Whole Blood   Date Value Ref Range Status   10/27/2023 27 20 - 32 mmol/L Final     Anion Gap   Date Value Ref Range Status   11/14/2023 12 7 - 15 mmol/L Final   05/26/2021 7 3 - 14 mmol/L Final     Glucose "   Date Value Ref Range Status   11/14/2023 90 70 - 99 mg/dL Final   05/26/2021 125 (H) 70 - 99 mg/dL Final     GLUCOSE BY METER POCT   Date Value Ref Range Status   11/01/2023 121 (H) 70 - 99 mg/dL Final     Urea Nitrogen   Date Value Ref Range Status   11/14/2023 28.2 (H) 8.0 - 23.0 mg/dL Final   05/26/2021 41 (H) 7 - 30 mg/dL Final     Creatinine   Date Value Ref Range Status   11/14/2023 1.34 (H) 0.67 - 1.17 mg/dL Final   05/26/2021 0.80 0.66 - 1.25 mg/dL Final     GFR Estimate   Date Value Ref Range Status   11/14/2023 56 (L) >60 mL/min/1.73m2 Final   05/26/2021 90 >60 mL/min/[1.73_m2] Final     Comment:     Non  GFR Calc  Starting 12/18/2018, serum creatinine based estimated GFR (eGFR) will be   calculated using the Chronic Kidney Disease Epidemiology Collaboration   (CKD-EPI) equation.       GFR, ESTIMATED POCT   Date Value Ref Range Status   10/26/2023 >60 >60 mL/min/1.73m2 Final     Calcium   Date Value Ref Range Status   11/14/2023 8.7 (L) 8.8 - 10.2 mg/dL Final   05/26/2021 8.3 (L) 8.5 - 10.1 mg/dL Final     Bilirubin Total   Date Value Ref Range Status   11/13/2023 0.3 <=1.2 mg/dL Final   03/20/2018 0.5 0.2 - 1.3 mg/dL Final     Alkaline Phosphatase   Date Value Ref Range Status   11/13/2023 99 40 - 129 U/L Final   03/20/2018 82 40 - 150 U/L Final     ALT   Date Value Ref Range Status   11/13/2023 56 0 - 70 U/L Final     Comment:     Reference intervals for this test were updated on 6/12/2023 to more accurately reflect our healthy population. There may be differences in the flagging of prior results with similar values performed with this method. Interpretation of those prior results can be made in the context of the updated reference intervals.     03/20/2018 22 0 - 70 U/L Final     AST   Date Value Ref Range Status   11/13/2023 46 (H) 0 - 45 U/L Final     Comment:     Reference intervals for this test were updated on 6/12/2023 to more accurately reflect our healthy population. There may  "be differences in the flagging of prior results with similar values performed with this method. Interpretation of those prior results can be made in the context of the updated reference intervals.   03/20/2018 20 0 - 45 U/L Final       CBC  CBC RESULTS:   Recent Labs   Lab Test 11/14/23  0454   WBC 7.0   RBC 3.14*   HGB 10.2*   HCT 30.6*   MCV 98   MCH 32.5   MCHC 33.3   RDW 13.4          LIPIDS  Recent Labs   Lab Test 10/26/23  0336 10/26/23  0059   CHOL 96 117   HDL 30* 32*   LDL 60 67   TRIG 29 89       TSH  No results found for: \"TSH\"    HBA1C  Lab Results   Component Value Date    A1C 4.8 03/13/2023       Cardiac Data:   Echo 10/29/23  Ischemic CM. Large LAD MI. Left ventricular function is decreased. The  ejection fraction is 20-30% (severely reduced).  Global right ventricular function is normal.  No significant valvular abnormalities present.  IVC diameter <2.1 cm collapsing >50% with sniff suggests a normal RA pressure  of 3 mmHg.  No pericardial effusion is present.  No significant changes noted.    Cath 10/26/23 10/27/23    1st Mrg lesion is 20% stenosed.    Prox LAD to Mid LAD lesion is 100% stenosed.    1st Diag-1 lesion is 80% stenosed.    1st Diag-2 lesion is 50% stenosed.    Dist LAD lesion is 100% stenosed.    RPDA lesion is 70% stenosed.    Dist RCA lesion is 40% stenosed.     Anterior STEMI  Two vessel obstructive CAD (100% pLAD occlusion, 70% rPDA stenosis, 80% diagonal 1 stenosis)  PCI of pLAD to mLAD with BRENDA x 1 (Synergy 2.50x 28 mm) post dilated to 2.75 vessel      Assessment and plan    74 yo  with a hx of anterior STEMI s.p PCI to the pLAD on 10/26 with a VT/VF arrest the next day (10/27) with patent stents and unchanged anatomy on repeat angiogram. Placed on amiodarone and discharged 11/03/23    Refractory VF VT  cardiac arrest date 10/27/23    Etiology: Ischemic s/p PCI to LAD (arrest was within 48h post revasc)  Fully revasc:  no- residual 70% rPDA stenosis, 80% diagonal 1 " stenosis  LVEF:  TTE 10/29/23 LVEF: 20-30%,  RV function: normal  ICD:  Not indicated     Neuropsych referral (send message to neuro scheduling pool: P [facility] [id number] )  Behavioral health/psychology referral- offered and info given - they will think about this   Send [place] sudden cardiac arrest  group details ([email or web address]; [phone]).  Ordered cardiac rehab.  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   Reviewed med list; eliminated unnecessary meds as mentioned above/below-will assess reduction/elimination of lasix given he looks dry on exam and has no bp room for GDMT    Ischemic cardiomyopathy   Stage C  NYHA Class II  ACEi/ARB ordered with holding parameters currently not being given- losartan 12.5mg/d  BB being held given hypotension- toprol 12.5mg d  Aldosterone antagonist deferred while other medical therapy is prioritized  SCD prophylaxis decision deferred during medication uptitration  % BiV pacing: N/A  Fluid status hypovolemic- decrease lasix from 40mg/d to 20mg/d  - Cardiac MRI has been ordered to determine whether there is apical thrombi - is currently on apixaban further   - not tolerating GDMT - labs today, decreasing diuretic to allow room for meds  - core clinic referral for uptitration    History of possible AF during prior hospitalization  - no clear record  - will follow up on discharge event monitor - if unavailable -consider zio for evaluation of further arrhythmia determination of AF burden and discontinuation of amio which was prescribed for 3m post event   - is currently on apixaban given low EF which we will continue for now     CAD  - STEMI- s/p LAD PCI  - denies chest pain  - on plavix and apixaban     Sharon Meza MD

## 2023-11-20 NOTE — LETTER
11/20/2023      RE: Duane C Johnson  [address] [address] [place] [id number]       Dear Colleague,    Thank you for the opportunity to participate in the care of your patient, Duane C Johnson, at the [facility] [place] [facility]. Please see a copy of my visit note below.    [facility]iology [facility]  11/20/2023        History of Presenting Illness:  Duane C Johnson is a  72 yo  with a hx of anterior STEMI s.p PCI to the pLAD on 10/26 with a VT/VF arrest the next day (10/27) with patent stents and unchanged anatomy on repeat angiogram. Placed on amiodarone and discharged 11/03/23, ICD was not indicated as this was within 48h of his MI. His EF prior do discharge was 20-30% with a large area of akinesis in the LAD placed on apixaban due to this.     Since then he has been admitted 11/6-20 with AHFD and treated with IV lasix and unasyn. ?Aflutter in that hospitalization discharged with an event monitor that has not been completed. He had some lightheadedness and his metoprolol dose was adjusted. He represented and they changed his brillinta to the plavix. Since then he has had no further issues with shortness of breath. He tells me that he does not taste buds is eating but less than usual and tells me he has been losing weight.     He has been walking twice a day. He has not tolerated his metoprolol and his losartan given his SBP is <110mmHg and therefore has not taken it. They take his bp at least once a day bp~/60's with hr in the 50's. He has not had lightheadedness or dizziness.     He stopped smoking oct 27th. His labs today show a bicarb of 29 and SCr stable 1.12 normal CBC.    Home medications:   Eliquis 5 mg BID  Clopidogrel 75mg/d  toprol 12.5 mg daily-is only given when SBP>110mmHg   Losartan 12.5 mg daily- is only given when SBP>110mmHg   Amiodarone 200 mg daily  Melatonin 10 mg at bedtime  lipitor 80 mg daily  Lasix  "40mg/d       Past Medical History:  Prostate ca- was going to have prostatectomy the day after the STEMI-   Hernia    Family History:  No family hx of CAD or SCD    Social History:  Ex smoker     ROS:  A complete 10-point ROS was negative except as above.    Physical Exam:  BP 96/60 (BP Location: Right arm, Patient Position: Sitting, Cuff Size: Adult Regular)   Pulse 54   Ht 1.702 m (5' 7.01\")   Wt 65.1 kg (143 lb 8 oz)   SpO2 97%   BMI 22.47 kg/m      Gen: alert, interactive, NAD, wlaking with a cane (new since his STEMI)  HEENT: atraumatic, EOMI, MMM  Neck: supple, no JVP elevation appreciated.  CV: RRR, no murmurs, rubs.  Chest: CTAB, no wheezes or crackles  Abd: soft, NT, ND, +BS  Ext: no LE edema, 2+ peripheral pulses  Skin: warm and dry, no rashes on exposed surfaces  Neuro: alert and oriented, no focal deficits    Labs:   Labs and cardiac data reviewed personally in clinic.    CMP:  Sodium   Date Value Ref Range Status   11/14/2023 143 135 - 145 mmol/L Final     Comment:     Reference intervals for this test were updated on 09/26/2023 to more accurately reflect our healthy population. There may be differences in the flagging of prior results with similar values performed with this method. Interpretation of those prior results can be made in the context of the updated reference intervals.    05/26/2021 141 133 - 144 mmol/L Final     Sodium Whole Blood   Date Value Ref Range Status   10/27/2023 138 135 - 145 mmol/L Final     Potassium   Date Value Ref Range Status   11/15/2023 3.9 3.4 - 5.3 mmol/L Final   05/26/2021 4.5 3.4 - 5.3 mmol/L Final     Potassium Whole Blood   Date Value Ref Range Status   10/27/2023 4.1 3.4 - 5.3 mmol/L Final     Chloride   Date Value Ref Range Status   11/14/2023 105 98 - 107 mmol/L Final   05/26/2021 105 94 - 109 mmol/L Final     Chloride Whole Blood   Date Value Ref Range Status   10/27/2023 103 94 - 109 mmol/L Final     Carbon Dioxide   Date Value Ref Range Status "   05/26/2021 29 20 - 32 mmol/L Final     Carbon Dioxide (CO2)   Date Value Ref Range Status   11/14/2023 26 22 - 29 mmol/L Final     Carbon Dioxide Whole Blood   Date Value Ref Range Status   10/27/2023 27 20 - 32 mmol/L Final     Anion Gap   Date Value Ref Range Status   11/14/2023 12 7 - 15 mmol/L Final   05/26/2021 7 3 - 14 mmol/L Final     Glucose   Date Value Ref Range Status   11/14/2023 90 70 - 99 mg/dL Final   05/26/2021 125 (H) 70 - 99 mg/dL Final     GLUCOSE BY METER POCT   Date Value Ref Range Status   11/01/2023 121 (H) 70 - 99 mg/dL Final     Urea Nitrogen   Date Value Ref Range Status   11/14/2023 28.2 (H) 8.0 - 23.0 mg/dL Final   05/26/2021 41 (H) 7 - 30 mg/dL Final     Creatinine   Date Value Ref Range Status   11/14/2023 1.34 (H) 0.67 - 1.17 mg/dL Final   05/26/2021 0.80 0.66 - 1.25 mg/dL Final     GFR Estimate   Date Value Ref Range Status   11/14/2023 56 (L) >60 mL/min/1.73m2 Final   05/26/2021 90 >60 mL/min/[1.73_m2] Final     Comment:     Non  GFR Calc  Starting 12/18/2018, serum creatinine based estimated GFR (eGFR) will be   calculated using the Chronic Kidney Disease Epidemiology Collaboration   (CKD-EPI) equation.       GFR, ESTIMATED POCT   Date Value Ref Range Status   10/26/2023 >60 >60 mL/min/1.73m2 Final     Calcium   Date Value Ref Range Status   11/14/2023 8.7 (L) 8.8 - 10.2 mg/dL Final   05/26/2021 8.3 (L) 8.5 - 10.1 mg/dL Final     Bilirubin Total   Date Value Ref Range Status   11/13/2023 0.3 <=1.2 mg/dL Final   03/20/2018 0.5 0.2 - 1.3 mg/dL Final     Alkaline Phosphatase   Date Value Ref Range Status   11/13/2023 99 40 - 129 U/L Final   03/20/2018 82 40 - 150 U/L Final     ALT   Date Value Ref Range Status   11/13/2023 56 0 - 70 U/L Final     Comment:     Reference intervals for this test were updated on 6/12/2023 to more accurately reflect our healthy population. There may be differences in the flagging of prior results with similar values performed with this  "method. Interpretation of those prior results can be made in the context of the updated reference intervals.     03/20/2018 22 0 - 70 U/L Final     AST   Date Value Ref Range Status   11/13/2023 46 (H) 0 - 45 U/L Final     Comment:     Reference intervals for this test were updated on 6/12/2023 to more accurately reflect our healthy population. There may be differences in the flagging of prior results with similar values performed with this method. Interpretation of those prior results can be made in the context of the updated reference intervals.   03/20/2018 20 0 - 45 U/L Final       CBC  CBC RESULTS:   Recent Labs   Lab Test 11/14/23  0454   WBC 7.0   RBC 3.14*   HGB 10.2*   HCT 30.6*   MCV 98   MCH 32.5   MCHC 33.3   RDW 13.4          LIPIDS  Recent Labs   Lab Test 10/26/23  0336 10/26/23  0059   CHOL 96 117   HDL 30* 32*   LDL 60 67   TRIG 29 89       TSH  No results found for: \"TSH\"    HBA1C  Lab Results   Component Value Date    A1C 4.8 03/13/2023       Cardiac Data:   Echo 10/29/23  Ischemic CM. Large LAD MI. Left ventricular function is decreased. The  ejection fraction is 20-30% (severely reduced).  Global right ventricular function is normal.  No significant valvular abnormalities present.  IVC diameter <2.1 cm collapsing >50% with sniff suggests a normal RA pressure  of 3 mmHg.  No pericardial effusion is present.  No significant changes noted.    Cath 10/26/23 10/27/23    1st Mrg lesion is 20% stenosed.    Prox LAD to Mid LAD lesion is 100% stenosed.    1st Diag-1 lesion is 80% stenosed.    1st Diag-2 lesion is 50% stenosed.    Dist LAD lesion is 100% stenosed.    RPDA lesion is 70% stenosed.    Dist RCA lesion is 40% stenosed.     Anterior STEMI  Two vessel obstructive CAD (100% pLAD occlusion, 70% rPDA stenosis, 80% diagonal 1 stenosis)  PCI of pLAD to mLAD with BRENDA x 1 (Synergy 2.50x 28 mm) post dilated to 2.75 vessel      Assessment and plan    74 yo  with a hx of anterior STEMI s.p PCI to " the pLAD on 10/26 with a VT/VF arrest the next day (10/27) with patent stents and unchanged anatomy on repeat angiogram. Placed on amiodarone and discharged 11/03/23    Refractory VF VT  cardiac arrest date 10/27/23    Etiology: Ischemic s/p PCI to LAD (arrest was within 48h post revasc)  Fully revasc:  no- residual 70% rPDA stenosis, 80% diagonal 1 stenosis  LVEF:  TTE 10/29/23 LVEF: 20-30%,  RV function: normal  ICD:  Not indicated     Neuropsych referral (send message to neuro scheduling pool: P CLINIC [id number] )  Behavioral health/psychology referral- offered and info given - they will think about this   Send [place] sudden cardiac arrest  group details ([email or web address]; [phone]).  Ordered cardiac rehab.  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   Reviewed med list; eliminated unnecessary meds as mentioned above/below-will assess reduction/elimination of lasix given he looks dry on exam and has no bp room for GDMT    Ischemic cardiomyopathy   Stage C  NYHA Class II  ACEi/ARB ordered with holding parameters currently not being given- losartan 12.5mg/d  BB being held given hypotension- toprol 12.5mg d  Aldosterone antagonist deferred while other medical therapy is prioritized  SCD prophylaxis decision deferred during medication uptitration  % BiV pacing: N/A  Fluid status hypovolemic- decrease lasix from 40mg/d to 20mg/d  - Cardiac MRI has been ordered to determine whether there is apical thrombi - is currently on apixaban further   - not tolerating GDMT - labs today, decreasing diuretic to allow room for meds  - core clinic referral for uptitration    History of possible AF during prior hospitalization  - no clear record  - will follow up on discharge event monitor - if unavailable -consider zio for evaluation of further arrhythmia determination of AF burden and discontinuation of amio which  was prescribed for 3m post event   - is currently on apixaban given low EF which we will continue for now     CAD  - STEMI- s/p LAD PCI  - denies chest pain  - on plavix and apixaban     Sharon Meza MD        Please do not hesitate to contact me if you have any questions/concerns.     Sincerely,     Critical Care

## 2023-11-20 NOTE — PATIENT INSTRUCTIONS
Critical Care Cardiology Survivor Clinic                                                                You were seen today in the Critical Care Cardiology Survivor Clinic at the [facility]:       Dr. Ag Mcintyre        Your visit summary and instructions are as follows:    HOLD LASIX  Labs today  CORE clinic referral, see in one month  Cardiac rehab  Neuropsych referral   Cardiac MRI  Continue to work toward the recommendation of 150 minutes of moderate intensity exercise/week and maintain a healthy lifestyle (avoid illicit drugs, smoking and moderate alcohol consumption)    Return to Critical Care Cardiology Survivor Clinic with device check prior (if applicable) in three months     -Support group: This is not required but can be helpful to connect to other survivors.   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     -What is Health Psychology?  Health Psychology is a specialty that helps people cope with the stress and anxiety that often occurs with illness, emotional and psychological issues, and preparation for medical treatment including surgical procedures.    Please contact our psychologists directly with questions or to make an appointment.    Ruby Lindsey, Ph.D.,                 [phone]  Ashley White, Ph.D.                      [phone]  Tamiko Alonso, Ph.D.,                    [phone]          Pérez Faustin, Ph.D., [facility],   [phone]       Thank you for your visit today!     Please MyChart message me or call Chelsie Bocanegra RN or Angi Schaefer RN if you have any questions or concerns.      During Business Hours:  [phone], option # 1 (University) then option # 4 (medical questions) and ask to speak with my nurse.     After hours, weekends or holidays:   [phone], Option #4  Ask to speak to  the On-Call Cardiologist. Inform them you are a heart failure patient at the [place].

## 2023-11-20 NOTE — NURSING NOTE
Chief Complaint   Patient presents with    New Patient     NEW cardiac arrest       Vitals were taken, medications reviewed.    Sirisha Tolliver, EMT   12:30 PM

## 2023-11-21 ENCOUNTER — TELEPHONE (OUTPATIENT)
Dept: CARDIOLOGY | Facility: CLINIC | Age: 73
End: 2023-11-21
Payer: MEDICARE

## 2023-11-21 DIAGNOSIS — I50.21 ACUTE SYSTOLIC HEART FAILURE (H): ICD-10-CM

## 2023-11-21 LAB — HCV AB SERPL QL IA: NONREACTIVE

## 2023-11-21 RX ORDER — FUROSEMIDE 20 MG
20 TABLET ORAL DAILY
Qty: 90 TABLET | Refills: 1 | Status: SHIPPED | OUTPATIENT
Start: 2023-11-21 | End: 2024-01-12

## 2023-11-21 NOTE — CONFIDENTIAL NOTE
Date: 11/21/2023    Time of Call: 3:07 PM     Diagnosis:  systolic CHF     [ TORB ] Ordering provider: Dr. Sweeney  Order: reduce lasix to 20 mg every day     Order received by: Chelsie Bocanegra RN     Follow-up/additional notes: wife and Duane understand and agree to the plan.

## 2023-11-22 ENCOUNTER — HOSPITAL ENCOUNTER (EMERGENCY)
Facility: CLINIC | Age: 73
Discharge: HOME | End: 2023-11-22
Attending: FAMILY MEDICINE | Admitting: FAMILY MEDICINE
Payer: MEDICARE

## 2023-11-22 ENCOUNTER — HOSPITAL ENCOUNTER (OUTPATIENT)
Dept: CARDIOLOGY | Facility: CLINIC | Age: 73
Discharge: HOME | End: 2023-11-22
Attending: HOSPITALIST | Admitting: HOSPITALIST
Payer: MEDICARE

## 2023-11-22 ENCOUNTER — TELEPHONE (OUTPATIENT)
Dept: FAMILY MEDICINE | Facility: CLINIC | Age: 73
End: 2023-11-22
Payer: MEDICARE

## 2023-11-22 ENCOUNTER — NURSE TRIAGE (OUTPATIENT)
Dept: CARDIOLOGY | Facility: CLINIC | Age: 73
End: 2023-11-22

## 2023-11-22 ENCOUNTER — DOCUMENTATION ONLY (OUTPATIENT)
Dept: FAMILY MEDICINE | Facility: CLINIC | Age: 73
End: 2023-11-22
Payer: MEDICARE

## 2023-11-22 VITALS
WEIGHT: 138 LBS | DIASTOLIC BLOOD PRESSURE: 74 MMHG | RESPIRATION RATE: 10 BRPM | BODY MASS INDEX: 21.66 KG/M2 | SYSTOLIC BLOOD PRESSURE: 114 MMHG | HEIGHT: 67 IN | HEART RATE: 58 BPM | TEMPERATURE: 98.6 F | OXYGEN SATURATION: 93 %

## 2023-11-22 DIAGNOSIS — I50.9 CONGESTIVE HEART FAILURE, UNSPECIFIED HF CHRONICITY, UNSPECIFIED HEART FAILURE TYPE (H): ICD-10-CM

## 2023-11-22 DIAGNOSIS — I95.9 TRANSIENT HYPOTENSION: ICD-10-CM

## 2023-11-22 LAB
ALBUMIN SERPL BCG-MCNC: 3.9 G/DL (ref 3.5–5.2)
ALP SERPL-CCNC: 123 U/L (ref 40–150)
ALT SERPL W P-5'-P-CCNC: 63 U/L (ref 0–70)
ANION GAP SERPL CALCULATED.3IONS-SCNC: 12 MMOL/L (ref 7–15)
AST SERPL W P-5'-P-CCNC: 40 U/L (ref 0–45)
BASOPHILS # BLD AUTO: 0.1 10E3/UL (ref 0–0.2)
BASOPHILS NFR BLD AUTO: 1 %
BILIRUB DIRECT SERPL-MCNC: <0.2 MG/DL (ref 0–0.3)
BILIRUB SERPL-MCNC: 0.4 MG/DL
BUN SERPL-MCNC: 14.9 MG/DL (ref 8–23)
CALCIUM SERPL-MCNC: 9.2 MG/DL (ref 8.8–10.2)
CHLORIDE SERPL-SCNC: 96 MMOL/L (ref 98–107)
CREAT SERPL-MCNC: 1.06 MG/DL (ref 0.67–1.17)
DEPRECATED HCO3 PLAS-SCNC: 27 MMOL/L (ref 22–29)
EGFRCR SERPLBLD CKD-EPI 2021: 74 ML/MIN/1.73M2
EOSINOPHIL # BLD AUTO: 0.1 10E3/UL (ref 0–0.7)
EOSINOPHIL NFR BLD AUTO: 2 %
ERYTHROCYTE [DISTWIDTH] IN BLOOD BY AUTOMATED COUNT: 13.9 % (ref 10–15)
GLUCOSE SERPL-MCNC: 86 MG/DL (ref 70–99)
HCT VFR BLD AUTO: 36.8 % (ref 40–53)
HGB BLD-MCNC: 12.5 G/DL (ref 13.3–17.7)
HOLD SPECIMEN: NORMAL
HOLD SPECIMEN: NORMAL
IMM GRANULOCYTES # BLD: 0 10E3/UL
IMM GRANULOCYTES NFR BLD: 0 %
LIPASE SERPL-CCNC: 30 U/L (ref 13–60)
LYMPHOCYTES # BLD AUTO: 1.8 10E3/UL (ref 0.8–5.3)
LYMPHOCYTES NFR BLD AUTO: 32 %
MCH RBC QN AUTO: 33.1 PG (ref 26.5–33)
MCHC RBC AUTO-ENTMCNC: 34 G/DL (ref 31.5–36.5)
MCV RBC AUTO: 97 FL (ref 78–100)
MONOCYTES # BLD AUTO: 0.4 10E3/UL (ref 0–1.3)
MONOCYTES NFR BLD AUTO: 8 %
NEUTROPHILS # BLD AUTO: 3.3 10E3/UL (ref 1.6–8.3)
NEUTROPHILS NFR BLD AUTO: 57 %
NRBC # BLD AUTO: 0 10E3/UL
NRBC BLD AUTO-RTO: 0 /100
PLATELET # BLD AUTO: 309 10E3/UL (ref 150–450)
POTASSIUM SERPL-SCNC: 3.8 MMOL/L (ref 3.4–5.3)
PROT SERPL-MCNC: 6.9 G/DL (ref 6.4–8.3)
RBC # BLD AUTO: 3.78 10E6/UL (ref 4.4–5.9)
SODIUM SERPL-SCNC: 135 MMOL/L (ref 135–145)
TROPONIN T SERPL HS-MCNC: 57 NG/L
TROPONIN T SERPL HS-MCNC: 58 NG/L
WBC # BLD AUTO: 5.7 10E3/UL (ref 4–11)

## 2023-11-22 PROCEDURE — 93270 REMOTE 30 DAY ECG REV/REPORT: CPT

## 2023-11-22 PROCEDURE — 93010 ELECTROCARDIOGRAM REPORT: CPT | Performed by: FAMILY MEDICINE

## 2023-11-22 PROCEDURE — 93228 REMOTE 30 DAY ECG REV/REPORT: CPT | Performed by: INTERNAL MEDICINE

## 2023-11-22 PROCEDURE — 258N000003 HC RX IP 258 OP 636: Performed by: FAMILY MEDICINE

## 2023-11-22 PROCEDURE — 84484 ASSAY OF TROPONIN QUANT: CPT | Performed by: FAMILY MEDICINE

## 2023-11-22 PROCEDURE — 96360 HYDRATION IV INFUSION INIT: CPT | Performed by: FAMILY MEDICINE

## 2023-11-22 PROCEDURE — 93005 ELECTROCARDIOGRAM TRACING: CPT | Performed by: FAMILY MEDICINE

## 2023-11-22 PROCEDURE — 93005 ELECTROCARDIOGRAM TRACING: CPT | Mod: 76 | Performed by: FAMILY MEDICINE

## 2023-11-22 PROCEDURE — 99284 EMERGENCY DEPT VISIT MOD MDM: CPT | Mod: 25 | Performed by: FAMILY MEDICINE

## 2023-11-22 PROCEDURE — 85004 AUTOMATED DIFF WBC COUNT: CPT | Performed by: FAMILY MEDICINE

## 2023-11-22 PROCEDURE — 80053 COMPREHEN METABOLIC PANEL: CPT | Performed by: FAMILY MEDICINE

## 2023-11-22 PROCEDURE — 83690 ASSAY OF LIPASE: CPT | Performed by: FAMILY MEDICINE

## 2023-11-22 PROCEDURE — 82248 BILIRUBIN DIRECT: CPT | Performed by: FAMILY MEDICINE

## 2023-11-22 PROCEDURE — 36415 COLL VENOUS BLD VENIPUNCTURE: CPT | Performed by: FAMILY MEDICINE

## 2023-11-22 RX ADMIN — SODIUM CHLORIDE 500 ML: 9 INJECTION, SOLUTION INTRAVENOUS at 17:55

## 2023-11-22 ASSESSMENT — ACTIVITIES OF DAILY LIVING (ADL): ADLS_ACUITY_SCORE: 35

## 2023-11-22 NOTE — PROGRESS NOTES
Discussed with patient via phone.  Hepatitis C screening test negative.  Patient going to ER for low blood pressure.  Clinically feeling well.  Has an appointment with Katiana on Friday.  All questions answered.    Dr Randolph  
none

## 2023-11-22 NOTE — ED TRIAGE NOTES
Pt arrives via private car with spouse with concerns  for low BP. Pt had Zio patch placed today, pt's home health care nurse was out for visit, was told his BP and HR was low, called cardiology clinic who told pt to come to ED for eval.      Triage Assessment (Adult)       Row Name 11/22/23 0857          Triage Assessment    Airway WDL WDL        Respiratory WDL    Respiratory WDL WDL        Skin Circulation/Temperature WDL    Skin Circulation/Temperature WDL WDL        Cardiac WDL    Cardiac WDL X;rhythm     Pulse Rate & Regularity bradycardic     Cardiac Rhythm SB        Peripheral/Neurovascular WDL    Peripheral Neurovascular WDL WDL        Cognitive/Neuro/Behavioral WDL    Cognitive/Neuro/Behavioral WDL WDL

## 2023-11-22 NOTE — TELEPHONE ENCOUNTER
"Call from BLADE Arroyo with [facility]. Patient and his wife are there on speaker with her. She called his primary clinic and they told her to call Cardiology. Patient's first Home Care visit. Patient has very low BP and pulse. He is very tired. Wife says he probably isn't drinking enough. Drinking decaffeinated coffee. Skin tents when pinched. Denies headache, dizziness, lightheadedness, chest pain. Color is very pale. BP 78/60 in left arm, 80/56 in right arm with HR 53. I told them he needs to go back to the hospital. Patient and his wife are in agreement and will return to the hospital.    Reason for Disposition   Systolic BP < 80 and NOT dizzy, lightheaded or weak (feels normal)   Drinking very little and dehydration suspected (e.g., no urine > 12 hours, very dry mouth, very lightheaded)    Additional Information   Negative: Systolic BP < 90 and feeling dizzy, lightheaded, or weak   Negative: Started suddenly after an allergic medicine, an allergic food, or bee sting   Negative: Shock suspected (e.g., cold/pale/clammy skin, too weak to stand, low BP, rapid pulse)   Negative: Difficult to awaken or acting confused (e.g., disoriented, slurred speech)   Negative: Fainted   Negative: Chest pain   Negative: Bleeding (e.g., vomiting blood, rectal bleeding or tarry stools, severe vaginal bleeding)   Negative: Extra heartbeats, irregular heart beating, or heart is beating very fast (i.e., 'palpitations')   Negative: Sounds like a life-threatening emergency to the triager   Negative: Abdominal pain   Negative: Major surgery in the past month   Negative: Fever > 100.4 F (38.0 C)    Answer Assessment - Initial Assessment Questions  1. BLOOD PRESSURE: \"What is your blood pressure?\" \"Did you take at least two measurements 5 minutes apart?\"      BP 80/56 right arm; 78/60 Left arm at 4:23 PM with manual BP cuff by [facility].   2. ONSET: \"When did you take your blood pressure?\"      4:23 PM  3. HOW: \"How did you " "take your blood pressure?\" (e.g., visiting nurse, automatic home BP monitor)      Manual cuff  4. HISTORY: \"Do you have a history of low blood pressure?\" \"What is your blood pressure normally?\"      Don't know.   5. MEDICINES: \"Are you taking any medicines for blood pressure?\" If Yes, ask: \"Have they been changed recently?\"      Has not been taking his BP meds because BP is too low.   6. PULSE RATE: \"Do you know what your pulse rate is?\"       53  7. OTHER SYMPTOMS: \"Have you been sick recently?\" \"Have you had a recent injury?\"      Multiple admissions: 10/26-11/3 VT/VF arrest x 2, angiogram with stents 10/26; 11/6-11/12 admitted for CHF, aspiration pneumonia-acute hypoxic respiratory failure.   8. PREGNANCY: \"Is there any chance you are pregnant?\" \"When was your last menstrual period?\"      N/A    Protocols used: Blood Pressure - Low-A-OH    "

## 2023-11-22 NOTE — ED PROVIDER NOTES
History     Chief Complaint   Patient presents with    Hypotension   Low heart rate, low blood pressure, dehydration concerns  HPI  Duane C Johnson is a 73 year old male, past medical history is significant for anxiety, congestive heart failure, history of ST elevation MI, multifocal pneumonia, history of respiratory failure, hypertension, shortness of breath, ASCVD, history of cardiac arrest, prostate cancer, hyperlipidemia, tobacco dependence syndrome, presents to the emergency department concerns of low heart rate, low blood pressure and dehydration concerns.  History is obtained from the patient and his wife as they present at the direction apparently of both home health nurse and his cardiology nurse with concerns of a low heart rate and low blood pressure and possible dehydration concerns today.  History from the patient's wife raises the concerns of MI x 1 about a month ago and cardiac arrest x 2 subsequently, he had a Zio patch monitor put on today and specialty services at the direction of cardiology.  The patient states he knows he does not eat or drink very well, he has felt otherwise his usual self he has not been lightheaded or dizzy or weak feeling he has had no chest pain tightness shortness of breath.  When he had his ST elevation MI on 10/26/2023 he had significant chest pain.  The patient is currently asymptomatic and has had no symptoms during the course of today with low blood pressures as discussed.  His wife reports blood pressures at home of 78/60 in the left arm and 80/56 on the right arm with a heart rate of 53 bpm.  They were directed to the emergency department.    Reviewed the EHR findings ST elevation MI record from 10/26/2023 with PTCA with BRENDA to LAD, his course was complicated by subsequent V-fib/V. tach arrest x 2 on 10/27/2020 3 repeat angio performed revealing patent stents    Allergies:  Allergies   Allergen Reactions    Brilinta [Ticagrelor]      Per pt and spouse,  hyperventilation, difficulty breathing    Clonazepam Other (See Comments)     Per spouse, acted like a zombie and he was shaky, could barely talk.       Problem List:    Patient Active Problem List    Diagnosis Date Noted    Anxiety 11/13/2023     Priority: Medium    Congestive heart failure, unspecified HF chronicity, unspecified heart failure type (H) 11/13/2023     Priority: Medium    Acute on chronic systolic congestive heart failure (H) 11/13/2023     Priority: Medium    ST elevation myocardial infarction involving left anterior descending (LAD) coronary artery (H) 11/13/2023     Priority: Medium    Acute respiratory failure with hypoxia (H) 11/07/2023     Priority: Medium    Multifocal pneumonia 11/07/2023     Priority: Medium    Elevated brain natriuretic peptide (BNP) level 11/07/2023     Priority: Medium    Primary hypertension 11/07/2023     Priority: Medium    SOB (shortness of breath) 11/07/2023     Priority: Medium    Coronary artery disease due to calcified coronary lesion 11/07/2023     Priority: Medium    Pneumonia of left upper lobe due to infectious organism 11/07/2023     Priority: Medium    Systolic CHF (H) 11/07/2023     Priority: Medium    Hx of cardiac arrest 10/27/2023     Priority: Medium    ST elevation MI (STEMI) (H) 10/26/2023     Priority: Medium    Prostate cancer (H) 09/23/2023     Priority: Medium    Hyperlipidemia LDL goal <130 01/26/2016     Priority: Medium    Adiposity 05/06/2010     Priority: Medium    Tobacco dependence syndrome 01/23/2008     Priority: Medium        Past Medical History:    Past Medical History:   Diagnosis Date    Benign essential hypertension     CAD (coronary artery disease)     Chronic systolic heart failure (H)     Hypertension     ST elevation myocardial infarction (STEMI), unspecified artery (H)     Ventricular fibrillation (H)        Past Surgical History:    Past Surgical History:   Procedure Laterality Date    COLONOSCOPY N/A 3/23/2023    Procedure:  COLONOSCOPY, FLEXIBLE, WITH LESION REMOVAL USING SNARE;  Surgeon: Jose Faustin MD;  Location: [facility]    CV CENTRAL VENOUS CATHETER PLACEMENT N/A 10/26/2023    Procedure: Central Venous Catheter Placement;  Surgeon: Rob Lyles MD;  Location: [facility] CARDIAC CATH LAB    CV CORONARY ANGIOGRAM N/A 10/27/2023    Procedure: Coronary Angiogram;  Surgeon: Rob Lyles MD;  Location: [facility] CARDIAC CATH LAB    CV CORONARY ANGIOGRAM N/A 10/26/2023    Procedure: Coronary Angiogram;  Surgeon: Rob Lyles MD;  Location: [facility] CARDIAC CATH LAB    CV LEFT HEART CATH N/A 10/26/2023    Procedure: Left Heart Catheterization;  Surgeon: Rob Lyles MD;  Location: [facility] CARDIAC CATH LAB    CV PCI N/A 10/27/2023    Procedure: Percutaneous Coronary Intervention;  Surgeon: Rob Lyles MD;  Location: [facility] CARDIAC CATH LAB    CV PCI STENT DRUG ELUTING N/A 10/26/2023    Procedure: Percutaneous Coronary Intervention Stent;  Surgeon: Rob Lyles MD;  Location:  HEART CARDIAC CATH LAB       Family History:    Family History   Problem Relation Age of Onset    Other Cancer Mother     Obesity Mother     Cervical Cancer Sister     GI problems Father     Other Cancer Sister        Social History:  Marital Status:   [2]  Social History     Tobacco Use    Smoking status: Former     Packs/day: .25     Types: Cigarettes     Passive exposure: Current    Smokeless tobacco: Never    Tobacco comments:     Quit 10/26/2023   Vaping Use    Vaping Use: Never used   Substance Use Topics    Alcohol use: Yes     Comment: occas    Drug use: No        Medications:    acetaminophen (TYLENOL) 325 MG tablet  amiodarone (PACERONE) 200 MG tablet  apixaban ANTICOAGULANT (ELIQUIS) 5 MG tablet  atorvastatin (LIPITOR) 80 MG tablet  cetirizine (ZYRTEC) 10 MG tablet  clopidogrel (PLAVIX) 75 MG tablet  escitalopram (LEXAPRO) 10 MG tablet  fish oil-omega-3 fatty acids 1000 MG  "capsule  furosemide (LASIX) 20 MG tablet  losartan (COZAAR) 25 MG tablet  melatonin 10 MG TABS tablet  metoprolol succinate ER (TOPROL XL) 25 MG 24 hr tablet  multivitamin w/minerals (THERA-VIT-M) tablet  vitamin C (ASCORBIC ACID) 1000 MG TABS          Review of Systems   All other systems reviewed and are negative.      Physical Exam   BP: 115/75  Pulse: 56  Temp: 98.6  F (37  C)  Resp: 20  Height: 170.2 cm (5' 7\")  Weight: 62.6 kg (138 lb)  SpO2: 95 %      Physical Exam  Vitals and nursing note reviewed.   Constitutional:       General: He is not in acute distress.     Appearance: Normal appearance. He is normal weight. He is not ill-appearing.   HENT:      Head: Normocephalic and atraumatic.      Right Ear: Tympanic membrane normal.      Left Ear: Tympanic membrane normal.      Nose: Nose normal.      Mouth/Throat:      Mouth: Mucous membranes are dry.      Pharynx: Oropharynx is clear.   Eyes:      Extraocular Movements: Extraocular movements intact.      Conjunctiva/sclera: Conjunctivae normal.      Pupils: Pupils are equal, round, and reactive to light.   Cardiovascular:      Rate and Rhythm: Normal rate and regular rhythm.      Pulses: Normal pulses.      Heart sounds: Normal heart sounds.   Pulmonary:      Effort: Pulmonary effort is normal.      Breath sounds: Normal breath sounds.   Abdominal:      General: Bowel sounds are normal.      Palpations: Abdomen is soft.   Musculoskeletal:         General: Normal range of motion.      Cervical back: Normal range of motion and neck supple.   Skin:     General: Skin is warm and dry.      Capillary Refill: Capillary refill takes less than 2 seconds.   Neurological:      General: No focal deficit present.      Mental Status: He is alert and oriented to person, place, and time.   Psychiatric:         Mood and Affect: Mood normal.         Behavior: Behavior normal.         ED Course                 Procedures              EKG Interpretation:      Interpreted by Chava" Eddy Emmanuel MD  Time reviewed: Time obtained 520 time interpreted same sinus bradycardia 55 bpm concave up ST segments V2 V3 which are comparable to elevation seen on cardiogram dated 11/7/2023 however there is T wave inversion in V1 to V3 and V4 which was not present on cardiogram reviewed previously.  The patient has no symptoms at the time of this EKG.    Critical Care time:  none               Results for orders placed or performed during the hospital encounter of 11/22/23 (from the past 24 hour(s))   CBC with platelets differential    Narrative    The following orders were created for panel order CBC with platelets differential.  Procedure                               Abnormality         Status                     ---------                               -----------         ------                     CBC with platelets and d...[[id number]]  Abnormal            Final result                 Please view results for these tests on the individual orders.   Basic metabolic panel   Result Value Ref Range    Sodium 135 135 - 145 mmol/L    Potassium 3.8 3.4 - 5.3 mmol/L    Chloride 96 (L) 98 - 107 mmol/L    Carbon Dioxide (CO2) 27 22 - 29 mmol/L    Anion Gap 12 7 - 15 mmol/L    Urea Nitrogen 14.9 8.0 - 23.0 mg/dL    Creatinine 1.06 0.67 - 1.17 mg/dL    GFR Estimate 74 >60 mL/min/1.73m2    Calcium 9.2 8.8 - 10.2 mg/dL    Glucose 86 70 - 99 mg/dL   Troponin T, High Sensitivity   Result Value Ref Range    Troponin T, High Sensitivity 58 (H) <=22 ng/L   Elizabeth Draw    Narrative    The following orders were created for panel order Elizabeth Draw.  Procedure                               Abnormality         Status                     ---------                               -----------         ------                     Extra Blue Top Tube[[id number]]                              Final result               Extra Red Top Tube[[id number]]                               Final result                 Please view results for these  tests on the individual orders.   CBC with platelets and differential   Result Value Ref Range    WBC Count 5.7 4.0 - 11.0 10e3/uL    RBC Count 3.78 (L) 4.40 - 5.90 10e6/uL    Hemoglobin 12.5 (L) 13.3 - 17.7 g/dL    Hematocrit 36.8 (L) 40.0 - 53.0 %    MCV 97 78 - 100 fL    MCH 33.1 (H) 26.5 - 33.0 pg    MCHC 34.0 31.5 - 36.5 g/dL    RDW 13.9 10.0 - 15.0 %    Platelet Count 309 150 - 450 10e3/uL    % Neutrophils 57 %    % Lymphocytes 32 %    % Monocytes 8 %    % Eosinophils 2 %    % Basophils 1 %    % Immature Granulocytes 0 %    NRBCs per 100 WBC 0 <1 /100    Absolute Neutrophils 3.3 1.6 - 8.3 10e3/uL    Absolute Lymphocytes 1.8 0.8 - 5.3 10e3/uL    Absolute Monocytes 0.4 0.0 - 1.3 10e3/uL    Absolute Eosinophils 0.1 0.0 - 0.7 10e3/uL    Absolute Basophils 0.1 0.0 - 0.2 10e3/uL    Absolute Immature Granulocytes 0.0 <=0.4 10e3/uL    Absolute NRBCs 0.0 10e3/uL   Extra Blue Top Tube   Result Value Ref Range    Hold Specimen Critical [facility] hospital    Extra Red Top Tube   Result Value Ref Range    Hold Specimen Critical [facility] hospital    Hepatic panel   Result Value Ref Range    Protein Total 6.9 6.4 - 8.3 g/dL    Albumin 3.9 3.5 - 5.2 g/dL    Bilirubin Total 0.4 <=1.2 mg/dL    Alkaline Phosphatase 123 40 - 150 U/L    AST 40 0 - 45 U/L    ALT 63 0 - 70 U/L    Bilirubin Direct <0.20 0.00 - 0.30 mg/dL   Lipase   Result Value Ref Range    Lipase 30 13 - 60 U/L   Troponin T, High Sensitivity   Result Value Ref Range    Troponin T, High Sensitivity 57 (H) <=22 ng/L   8:44 PM  Troponin steady every 2 hourly interval, significantly decreased from the patient's recent hospitalization which is reassuring.  He has no ischemic features at presentation.  I suspect that he was likely over diuresed while in hospital hence the hypotension.  I very cautiously gave him 500 cc of normal saline here and he has not been hypotensive either prior to this and he remains comfortable afterwards with acceptable blood pressure and heart rate.  His wife informs me that he has not taken  his beta-blocker in a week because the parameters that they gave him at discharge were not to take it if his heart rate was below 60 or if his systolic blood pressure was below 110.  I think this is reasonable.  For now.  In the long-term this gentleman should be on a beta-blocker in addition to his ACE/ARB.  Fluid restrict to 1.5 L/day.  Continue all other medications with the exception of the beta-blocker with the provisions above.  I spoke with Dr. Thomas Pat for cardiology at [facility] he recommended the above and close follow-up with cardiology i.e. telemetry, early next week.  The patient already has an appointment this coming Friday with his primary which I have encouraged him to keep.  They have already celebrated Thanksgiving and so it might dietary recommendations with salt restriction and avoidance of salty ham and turkey and associated side dishes is somewhat irrelevant as they will not be celebrating Thanksgiving and he is very careful about his salt intake.  Disposition is to home.      Medications   sodium chloride 0.9% BOLUS 500 mL (0 mLs Intravenous Stopped 11/22/23 1924)       Assessments & Plan (with Medical Decision Making)   Assessment and plan with medical decision making at the time stamp above.    Disclaimer: This note consists of symbols derived from keyboarding, dictation and/or voice recognition software. As a result, there may be errors in the script that have gone undetected. Please consider this when interpreting information found in this chart.    I have reviewed the nursing notes.    I have reviewed the findings, diagnosis, plan and need for follow up with the patient.             New Prescriptions    No medications on file       Final diagnoses:   Transient hypotension - Resolved; suspect related to overdiuresis with recent hospitalization.       11/22/2023   [facility] EMERGENCY DEPT       Chava Emmanuel MD  11/22/23 2048

## 2023-11-22 NOTE — TELEPHONE ENCOUNTER
Called Duane and wife to let them know that the cMRI has been moved up to 12/6 on WEdnesday ,  December 6, arrive at noon.    Pre-procedure instructions - Cardiac MRI with Contrast, Stress, and Flow  Patient Education    Your arrival time is December 6 at 12 noon.  Location is [address] Waiting Room    How do I prepare for my exam? (Food and drink instructions)  Stop all caffeine 12 hours before the test. This includes coffee, tea, soda, chocolate and certain medicines (such as Anacin, Excedrin and NoDoz). Also avoid decaf coffee and tea, as these contain small amounts of caffeine.  You may drink water and take your morning medicines.  (Other Instructions)  You may need to stop some medicines before the test. Follow your doctor's orders.  You will need to arrive 1 hour early if you will be taking an anxiety medication for the test.     *2 days before:  Stop taking dipyridamole (Aggrenox, Persantine, Permole)  Stop taking Sildenafil (Viagra), Tadalafil (Cialis) and Vardenafil (Levitra).  If you are taking the medication for Pulmonary Hypertesnion DO NOT hold.     12 hours before:  Stop taking Theophylline (Theolair) & Aminophylline     What Should I wear: The MRI machine uses a strong magnet. Due to increased risk of metallic materials/threading in clothing, for your safety, you will be requested to change into a hospital gown. Please remove any body piercings and hair or magnetic eyelash extensions before you arrive. You will also remove watches, jewelry, hairpins, wallets, dentures, partial dental plates and hearing aids. You may wear contact lenses, and you may be able to wear your rings. We have a safe place to keep your personal items, but it is safer to leave them at home.     How long does the Exam Take: Most tests take up to 90 minutes.       What Should I Bring: If you have any implants please bring a card or surgical  report with the implant information.  We may be unable to scan you if we do not have this information. Bring the results of similar scans you may have had previously. If you are a minor (under age 18) you will need to bring a parent or legal.     Do I need a : No     What Should I do after the Exam:  No restrictions, you may resume normal activities.     What is this test:  MRI (magnetic resonance imaging) uses a strong magnet and radio waves to look inside the body. An MRA (magnetic resonance angiogram) does the same thing, but it lets us look at your blood vessels. A computer turns the radio waves into pictures showing cross sections of the body, much like slices of bread. This helps us see any problems more clearly. You may receive fluid (called  contrast ) before or during your scan. The fluid helps us see the pictures better. We give the fluid through an IV (small needle in your arm).

## 2023-11-22 NOTE — TELEPHONE ENCOUNTER
"  Home Care is calling regarding an established patient with [facility].       Requesting orders from: Jose Coles  Provider is following patient: Patient has seen Dr Randolph recently and scheduled to see Katiana Reyes 11/24/23.   Is this a 60-day recertification request?  No    Orders Requested  Home Care RN Dina calling to report low BP readings.   He has parameters to call his provider if SBP <90 or >160 and heart rate <60 OR > 110.   Home readings in AM prior to taking meds: 11/12/23: 102/61, 11/18/23: 90/50, 11/20/23: 96/60, 11/21/23: 102/66,   11/22/23: 88/62 . He has parameters to hold his metoprolol and losartan if SBP < 110 which has has been doing. Now per home care nurse via manual cuff: 80/56 P 54 and vitals machine: 106/63 P 54. Duane states he \"feels pretty good\", Not really lightheaded or dizzy or short of breath. Does not feel different with position changes. He has some shortness of breath with activity. He is speaking in full sentences on speaker phone, says he does feel a little sluggish and sleepy the past couple of days. Unsure if he is drinking enough, skin turgor per home care nurse is slow.Is also on furosemide 20 mg daily. No lower extremity swelling.   Has had multiple recent hospitalizations for MI, reduced EF, Cardiac arrest, CHF, Respiratory failure and pneumonia. Had Event Monitor placed today.   Advised to reach out to cardiology.  Regarding medications and BP readings. Recheck another BP while home care nurse there. ER for continued low BP. His wife asked what they would do. That would be determined after he is evaluated, vitals are taken. I cannot say if he would needs labs, IV fluids? Encouraged to increase his fluid intake in the meantime as well.   Will route to cardiology pool as well.    Cici Saavedra, BLADE    "

## 2023-11-23 NOTE — DISCHARGE INSTRUCTIONS
Fluid restrict to less than 1.5 L/day.  Continue all current medications with the exception of your beta-blocker as we discussed and has been discussed with you previously with cardiology; do not take this medication if you are heart rate is less than 60 or your systolic (top number) is less than 110.  Cardiology follow-up, telemetry, early next week.  Please keep the appointment with your primary care provider.  Return to the emergency department if any concerns over the holiday weekend.  I hope you have a wonderful Thanksgiving.

## 2023-11-24 ENCOUNTER — OFFICE VISIT (OUTPATIENT)
Dept: FAMILY MEDICINE | Facility: CLINIC | Age: 73
End: 2023-11-24
Payer: MEDICARE

## 2023-11-24 ENCOUNTER — PATIENT OUTREACH (OUTPATIENT)
Dept: CARE COORDINATION | Facility: CLINIC | Age: 73
End: 2023-11-24

## 2023-11-24 VITALS
RESPIRATION RATE: 14 BRPM | DIASTOLIC BLOOD PRESSURE: 62 MMHG | TEMPERATURE: 97 F | SYSTOLIC BLOOD PRESSURE: 98 MMHG | WEIGHT: 142 LBS | HEART RATE: 57 BPM | BODY MASS INDEX: 22.29 KG/M2 | HEIGHT: 67 IN | OXYGEN SATURATION: 97 %

## 2023-11-24 DIAGNOSIS — I95.9 TRANSIENT HYPOTENSION: Primary | ICD-10-CM

## 2023-11-24 DIAGNOSIS — I50.20 HEART FAILURE WITH REDUCED EJECTION FRACTION, NYHA CLASS I (H): ICD-10-CM

## 2023-11-24 PROBLEM — I50.23 ACUTE ON CHRONIC SYSTOLIC CONGESTIVE HEART FAILURE (H): Status: RESOLVED | Noted: 2023-11-13 | Resolved: 2023-11-24

## 2023-11-24 PROCEDURE — 99214 OFFICE O/P EST MOD 30 MIN: CPT | Performed by: NURSE PRACTITIONER

## 2023-11-24 RX ORDER — RESPIRATORY SYNCYTIAL VIRUS VACCINE 120MCG/0.5
0.5 KIT INTRAMUSCULAR ONCE
Qty: 1 EACH | Refills: 0 | Status: CANCELLED | OUTPATIENT
Start: 2023-11-24 | End: 2023-11-24

## 2023-11-24 ASSESSMENT — PATIENT HEALTH QUESTIONNAIRE - PHQ9
SUM OF ALL RESPONSES TO PHQ QUESTIONS 1-9: 1
10. IF YOU CHECKED OFF ANY PROBLEMS, HOW DIFFICULT HAVE THESE PROBLEMS MADE IT FOR YOU TO DO YOUR WORK, TAKE CARE OF THINGS AT HOME, OR GET ALONG WITH OTHER PEOPLE: NOT DIFFICULT AT ALL
SUM OF ALL RESPONSES TO PHQ QUESTIONS 1-9: 1

## 2023-11-24 ASSESSMENT — ACTIVITIES OF DAILY LIVING (ADL): DEPENDENT_IADLS:: INDEPENDENT

## 2023-11-24 ASSESSMENT — ANXIETY QUESTIONNAIRES
3. WORRYING TOO MUCH ABOUT DIFFERENT THINGS: NOT AT ALL
6. BECOMING EASILY ANNOYED OR IRRITABLE: NOT AT ALL
5. BEING SO RESTLESS THAT IT IS HARD TO SIT STILL: NOT AT ALL
1. FEELING NERVOUS, ANXIOUS, OR ON EDGE: NOT AT ALL
GAD7 TOTAL SCORE: 0
2. NOT BEING ABLE TO STOP OR CONTROL WORRYING: NOT AT ALL
GAD7 TOTAL SCORE: 0
IF YOU CHECKED OFF ANY PROBLEMS ON THIS QUESTIONNAIRE, HOW DIFFICULT HAVE THESE PROBLEMS MADE IT FOR YOU TO DO YOUR WORK, TAKE CARE OF THINGS AT HOME, OR GET ALONG WITH OTHER PEOPLE: NOT DIFFICULT AT ALL
7. FEELING AFRAID AS IF SOMETHING AWFUL MIGHT HAPPEN: NOT AT ALL
4. TROUBLE RELAXING: NOT AT ALL

## 2023-11-24 ASSESSMENT — PAIN SCALES - GENERAL: PAINLEVEL: NO PAIN (0)

## 2023-11-24 NOTE — PROGRESS NOTES
Clinic Care Coordination Contact    Situation: Patient chart reviewed by care coordinator.    Background: 11/22/2023  ED visit Low blood pressure and pulse dehydration due to being overduresis     Assessment: Patient had an appointment with PCP this morning    Plan/Recommendations: No outreach done today  Will follow up in 1-2 business days     [facility]   Ruthy Paiz RN, Care Coordinator   [facility]'s   E-mail [email or web address]   [phone]

## 2023-11-24 NOTE — TELEPHONE ENCOUNTER
Called Duane and Sharon. He was seen by his PCP today. BP 98/62 with a HR of 57. He is feeling better today but doesn't know what to do for his medications. Currently no dizziness, lightheadedness. Blood pressure's still a little bit lower. Still hasn't taken Metoprolol or Losartan given parameters, Advised by ER to hold Furosemide for a week. He is sticking to his 1500mL fluid restriction. Will reach out to Dr. Sweeney for recommendations.

## 2023-11-24 NOTE — PATIENT INSTRUCTIONS
Looking and feeling better today    Continue to hold Metoprolol and Losartan as advised by Cardiology for parameters     Hold Furosemide as advised until next week    Stay hydrated ~ 1500 ml/day     Call Cardiology clinic regarding medications ~ due to not taking them at all ~ guidance/visit?      During Business Hours:  [phone], option # 1 (University) then option # 4 (medical questions) and ask to speak with my nurse.

## 2023-11-24 NOTE — PROGRESS NOTES
Assessment & Plan     Transient hypotension  Patient recently seen in the emergency room 2 days ago with low blood pressures, significant recent history of cardiac arrest, ST elevation MI and congestive heart failure.  Being managed by cardiology.  Recent visit on 11/20/2023.  Was given 500 cc bolus of fluids in ER, low blood pressures thought to be secondary to dehydration.  Patient has not been taking at home metoprolol or losartan due to blood pressure and heart rate parameters given by cardiology.  Blood pressures back within previous range, still soft.  Patient denies any symptoms of dizziness or lightheadedness.  Recommend continued management by cardiology regarding blood pressures, advised to reach out to determine further medication management as patient has not been taking metoprolol or losartan since discharge due to blood pressures.  Encouraged to continue 1500 mL fluid restriction at home to maintain hydration.    Heart failure with reduced ejection fraction, NYHA class I (H)  Recent hospitalization with cardiac arrest and ST elevation MI.  Followed by cardiology.  Follow-up as above.           MED REC REQUIRED  Post Medication Reconciliation Status: discharge medications reconciled, continue medications without change  See Patient Instructions    Katiana Reyes DNP, APRN-CNP   [facility]    Subjective Duane is a 73 year old, presenting for the following health issues:  ER F/U        11/24/2023     8:36 AM   Additional Questions   Roomed by Fariba LYNNE CMA       [facility]       ED/UC Followup:    Facility:  [facility] ED  Date of visit: 11/22/2023  Reason for visit: Hypotension  Current Status: Feels to be doing better. Checking BP every day to manage medications.    No dizziness, lightheadedness   Blood pressure's still a little bit lower   Still hasn't taken Metoprolol or Losartan given parameters   Advised by ER to hold Furosemide for a week  Is staying  "hydrated       Review of Systems   Constitutional, HEENT, cardiovascular, pulmonary, gi and gu systems are negative, except as otherwise noted.      Objective    BP 98/62 (BP Location: Right arm, Patient Position: Sitting, Cuff Size: Adult Regular)   Pulse 57   Temp 97  F (36.1  C) (Tympanic)   Resp 14   Ht 1.702 m (5' 7\")   Wt 64.4 kg (142 lb)   SpO2 97%   BMI 22.24 kg/m    Body mass index is 22.24 kg/m .  Physical Exam   GENERAL APPEARANCE: healthy, alert, and no distress  NECK: no adenopathy, no asymmetry, masses, or scars, and thyroid normal to palpation  RESP: lungs clear to auscultation - no rales, rhonchi or wheezes  CV: regular rates and rhythm, normal S1 S2, no S3 or S4, and no murmur, click or rub  ABDOMEN: soft, nontender, without hepatosplenomegaly or masses and bowel sounds normal  NEURO: Normal strength and tone, mentation intact, and speech normal  PSYCH: mentation appears normal and affect normal/bright    Diagnostic Test Results:  Labs reviewed in Epic  none          Chart documentation with Dragon Voice recognition Software. Although reviewed after completion, some words and grammatical errors may remain.         "

## 2023-11-27 ENCOUNTER — TELEPHONE (OUTPATIENT)
Dept: CARDIOLOGY | Facility: CLINIC | Age: 73
End: 2023-11-27
Payer: MEDICARE

## 2023-11-27 NOTE — TELEPHONE ENCOUNTER
Called Duane and Melissa. Duane felt pretty good over the weekend with some fatigue. Today HR 59, /65. Took his losartan and metoprolol but continuning to hold the lasix. Sent message to schedulers to get Duane seen with [facility] first available for continued close monitoring with medications. Duane on board with plan. They will call with any concerns in the meantime.

## 2023-11-28 ENCOUNTER — PATIENT OUTREACH (OUTPATIENT)
Dept: CARE COORDINATION | Facility: CLINIC | Age: 73
End: 2023-11-28
Payer: MEDICARE

## 2023-11-28 ASSESSMENT — ACTIVITIES OF DAILY LIVING (ADL): DEPENDENT_IADLS:: INDEPENDENT

## 2023-11-28 NOTE — PROGRESS NOTES
Clinic Care Coordination Contact  Clinic Care Coordination Contact  OUTREACH    Referral Information:  Referral Source: IP Handoff    Primary Diagnosis: CHF    Chief Complaint   Patient presents with    Clinic Care Coordination - Post Hospital     Clinic Care Coordination RN         Universal Utilization: 11/22/2023 ED visit Hypotension PCP visit 11/24/2023  Clinic Utilization  Difficulty keeping appointments:: No  Compliance Concerns: No  No-Show Concerns: No  No PCP office visit in Past Year: No  Utilization      No Show Count (past year)  1             ED Visits  4             Hospital Admissions  4                    Current as of: 11/28/2023  8:55 AM                Clinical Concerns:  Current Medical Concerns:  Patient and wife on the phone.  Patient is doing well and reports he walks around his property outside  First CR scheduled 12/13   weight stable within 1# and no leg swelling  Fluid restriction to 1500 cc a day CORE clinic appointment 11/30  Wife will ask when he can be released for his future prostrate surgery Patient continues home care services   Current Behavioral Concerns: No    Education Provided to patient: Continue to hydrate by drinking plenty of water    Pain  Pain (GOAL):: No  Health Maintenance Reviewed: Not assessed  Clinical Pathway: None    Medication Management:  Medication review status:   Not discussed today      Functional Status:  Dependent ADLs:: Independent  Dependent IADLs:: Independent  Bed or wheelchair confined:: No  Mobility Status: Independent    Living Situation:  Current living arrangement:: I live in a private home with spouse    Lifestyle & Psychosocial Needs:    Social Determinants of Health     Food Insecurity: Low Risk  (11/23/2023)    Food Insecurity     Within the past 12 months, did you worry that your food would run out before you got money to buy more?: No     Within the past 12 months, did the food you bought just not last and you didn t have money to get more?:  No   Depression: Not at risk (11/24/2023)    PHQ-2     PHQ-2 Score: 0   Housing Stability: Low Risk  (11/23/2023)    Housing Stability     Do you have housing? : Yes     Are you worried about losing your housing?: No   Tobacco Use: Medium Risk (11/24/2023)    Patient History     Smoking Tobacco Use: Former     Smokeless Tobacco Use: Never     Passive Exposure: Current   Financial Resource Strain: Low Risk  (11/23/2023)    Financial Resource Strain     Within the past 12 months, have you or your family members you live with been unable to get utilities (heat, electricity) when it was really needed?: No   Alcohol Use: Not on file   Transportation Needs: Low Risk  (11/23/2023)    Transportation Needs     Within the past 12 months, has lack of transportation kept you from medical appointments, getting your medicines, non-medical meetings or appointments, work, or from getting things that you need?: No   Physical Activity: Inactive (11/16/2023)    Exercise Vital Sign     Days of Exercise per Week: 0 days     Minutes of Exercise per Session: 0 min   Interpersonal Safety: Low Risk  (10/9/2023)    Interpersonal Safety     Do you feel physically and emotionally safe where you currently live?: Yes     Within the past 12 months, have you been hit, slapped, kicked or otherwise physically hurt by someone?: No     Within the past 12 months, have you been humiliated or emotionally abused in other ways by your partner or ex-partner?: No   Stress: Not on file   Social Connections: Unknown (11/16/2023)    Social Connection and Isolation Panel [NHANES]     Frequency of Communication with Friends and Family: Not on file     Frequency of Social Gatherings with Friends and Family: Not on file     Attends Mandaen Services: Not on file     Active Member of Clubs or Organizations: Not on file     Attends Club or Organization Meetings: Not on file     Marital Status:      Diet:: No added salt  Inadequate nutrition (GOAL):: No  Tube  Feeding: No  Inadequate activity/exercise (GOAL):: No  Significant changes in sleep pattern (GOAL): No  Transportation means:: Family     Yazidi or spiritual beliefs that impact treatment:: No  Mental health DX:: Yes  Mental health DX how managed:: Medication  Mental health management concern (GOAL):: No  Chemical Dependency Status: No Current Concerns  Informal Support system:: Spouse             Resources and Interventions:  Current Resources:   Skilled Home Care Services: Skilled Nursing, Home Health Aid, Physical Therapy, Occupational Therapy  Community Resources: Home Care  Supplies Currently Used at Home: None                    Referrals Placed: None         Care Plan:  Care Plan: Heart Failure       Problem: Heart Failure Management Needs Improvement       Goal: Demonstrate improved heart failure management       Start Date: 11/16/2023 Expected End Date: 1/16/2024    This Visit's Progress: 40% Recent Progress: 30%    Priority: High    Note:     Barriers: Deconditioned   Strengths: supportive wife   Patient expressed understanding of goal: Yes   Action steps to achieve this goal:  1. I will check my weight daily and call if weight gain is 2-3# in a day or 5# in a week,increased shortness of breath episodes or leg swelling   2. I will take my medications as directed and keep future provider appointments   3. I will keep future Cardiac Rehab appointments   4. I will continue home care services                                Patient/Caregiver understanding: Patient expresses good understanding of discharge instructions     Outreach Frequency: 2 weeks, more frequently as needed  Future Appointments                In 2 days  LAB [facility]    In 2 days Sirisha Arias, NADEGE CNP M [facility]    In 1 week UUM[facility]4 M [facility], [facility]    In 2 weeks [facility] 2 [facility] Cardiac and Pulmonary Rehabilitation [place],  [place] DEONNA    In 1 month Blas Causey MD [facility] Urology Clinic [place], [facility] GENESISY DMITRI    In 2 months [facility] CRITICAL CARE [facility] Heart [facility]    In 3 months Steve Dixon DO [facility] Specialty [facility] [place], [facility]    In 4 months Sedrick Lehman, PhD [facility] Neuropsychology [facility]            Plan: CC RN will follow up in 2-4  weeks         [facility]   Ruthy Paiz RN, Care Coordinator   [facility]'s   E-mail [email or web address]   [phone]

## 2023-11-30 ENCOUNTER — LAB (OUTPATIENT)
Dept: LAB | Facility: CLINIC | Age: 73
End: 2023-11-30
Attending: INTERNAL MEDICINE
Payer: MEDICARE

## 2023-11-30 ENCOUNTER — OFFICE VISIT (OUTPATIENT)
Dept: CARDIOLOGY | Facility: CLINIC | Age: 73
End: 2023-11-30
Attending: INTERNAL MEDICINE
Payer: MEDICARE

## 2023-11-30 VITALS
HEIGHT: 66 IN | WEIGHT: 141.1 LBS | DIASTOLIC BLOOD PRESSURE: 72 MMHG | SYSTOLIC BLOOD PRESSURE: 111 MMHG | OXYGEN SATURATION: 96 % | BODY MASS INDEX: 22.68 KG/M2 | HEART RATE: 50 BPM

## 2023-11-30 DIAGNOSIS — I50.20 HEART FAILURE WITH REDUCED EJECTION FRACTION, NYHA CLASS I (H): ICD-10-CM

## 2023-11-30 DIAGNOSIS — I50.21 ACUTE SYSTOLIC HEART FAILURE (H): ICD-10-CM

## 2023-11-30 DIAGNOSIS — I21.02 ST ELEVATION MYOCARDIAL INFARCTION INVOLVING LEFT ANTERIOR DESCENDING (LAD) CORONARY ARTERY (H): ICD-10-CM

## 2023-11-30 DIAGNOSIS — I50.9 CONGESTIVE HEART FAILURE, UNSPECIFIED HF CHRONICITY, UNSPECIFIED HEART FAILURE TYPE (H): ICD-10-CM

## 2023-11-30 DIAGNOSIS — I50.9 CONGESTIVE HEART FAILURE, UNSPECIFIED HF CHRONICITY, UNSPECIFIED HEART FAILURE TYPE (H): Primary | ICD-10-CM

## 2023-11-30 DIAGNOSIS — I46.9 CARDIAC ARREST (H): ICD-10-CM

## 2023-11-30 LAB
ANION GAP SERPL CALCULATED.3IONS-SCNC: 9 MMOL/L (ref 7–15)
BUN SERPL-MCNC: 10.7 MG/DL (ref 8–23)
CALCIUM SERPL-MCNC: 9 MG/DL (ref 8.8–10.2)
CHLORIDE SERPL-SCNC: 100 MMOL/L (ref 98–107)
CREAT SERPL-MCNC: 1.07 MG/DL (ref 0.67–1.17)
DEPRECATED HCO3 PLAS-SCNC: 26 MMOL/L (ref 22–29)
EGFRCR SERPLBLD CKD-EPI 2021: 73 ML/MIN/1.73M2
GLUCOSE SERPL-MCNC: 86 MG/DL (ref 70–99)
NT-PROBNP SERPL-MCNC: 5864 PG/ML (ref 0–900)
POTASSIUM SERPL-SCNC: 4.7 MMOL/L (ref 3.4–5.3)
SODIUM SERPL-SCNC: 135 MMOL/L (ref 135–145)

## 2023-11-30 PROCEDURE — 99214 OFFICE O/P EST MOD 30 MIN: CPT | Performed by: NURSE PRACTITIONER

## 2023-11-30 PROCEDURE — 36415 COLL VENOUS BLD VENIPUNCTURE: CPT | Performed by: PATHOLOGY

## 2023-11-30 PROCEDURE — 80048 BASIC METABOLIC PNL TOTAL CA: CPT | Performed by: PATHOLOGY

## 2023-11-30 PROCEDURE — 83880 ASSAY OF NATRIURETIC PEPTIDE: CPT | Performed by: PATHOLOGY

## 2023-11-30 PROCEDURE — G0463 HOSPITAL OUTPT CLINIC VISIT: HCPCS | Performed by: NURSE PRACTITIONER

## 2023-11-30 RX ORDER — AMIODARONE HYDROCHLORIDE 200 MG/1
200 TABLET ORAL DAILY
Qty: 90 TABLET | Refills: 3 | Status: ON HOLD | OUTPATIENT
Start: 2023-11-30

## 2023-11-30 ASSESSMENT — PAIN SCALES - GENERAL: PAINLEVEL: NO PAIN (0)

## 2023-11-30 NOTE — LETTER
11/30/2023      RE: Duane C Johnson  [id number] [address] [id number]       Dear Colleague,    Thank you for the opportunity to participate in the care of your patient, Duane C Johnson, at the [facility] [place] at [facility]. Please see a copy of my visit note below.      [facility] Cardiology   [facility]      HPI:   Mr. Aguila is a 73 year old male with medical history pertinent for anterior STEMI s/p PCI to the pLAD on 10/26/23 with a VT/VF arrest the next day (10/27) with patent stents and unchanged anatomy on repeat angiogram. Placed on amiodarone and discharged 11/03/23, ICD was not indicated as this was within 48h of his MI. His EF prior do discharge was 20-30% with a large area of akinesis in the LAD placed on apixaban due to this. He presents to CORE clinic for enrollment.      Mr. Aguila was readmitted 11/6-11/20/23 with ADHF and treated with IV lasix. He had some lightheadedness and his metoprolol dose was adjusted. Brillinta changed to the plavix due to SOB. Seen in the ED on 11/22/23 for hypotension with BPs 80s/60s. Holding lasix, metoprolol, and losartan. Of note, he was asymptomatic with hypotension.      Today, Mr. Aguila presents to clinic with his wife. He reports feeling well. Since hospital discharge he has been gradually increasing his activity. [place] and takes daily walks. He has a short loop and a long loop that he walks. He estimates that he can walk 20-25 minutes continuously with only mild DIOP at the end. Denies CP, palpitations, lightheadedness, syncope. Denies orthopnea, PND, LE edema. Does note that he's sleeping more. Naps anywhere from 1-3 hours and sleeps well throughout the night.   Wife closely montioors sodium and fluid intake.     Monitors daily BPs, 100-112/60s  Holding losartan for SBP < 110   Holding Metoprolol for SBP < 100  Weight 138-140 lb    Cardiac Medications   - Amiodarone 200  mg daily   - Eliquis 5 mg BID  - Atorvastatin 80 mg daily   - Plavix 75 mg daily   - Lasix 20 mg daily-holding  - Losartan 12.5 mg daily-holding   - Metoprolol succinate 12.5 mg daily-holding       PAST MEDICAL HISTORY:  Past Medical History:   Diagnosis Date    Benign essential hypertension     CAD (coronary artery disease)     Chronic systolic heart failure (H)     Hypertension     ST elevation myocardial infarction (STEMI), unspecified artery (H)     Ventricular fibrillation (H)        FAMILY HISTORY:  Family History   Problem Relation Age of Onset    Other Cancer Mother     Obesity Mother     Cervical Cancer Sister     GI problems Father     Other Cancer Sister        SOCIAL HISTORY:  Social History     Socioeconomic History    Marital status:      Spouse name: None    Number of children: None    Years of education: None    Highest education level: None   Tobacco Use    Smoking status: Former     Packs/day: .25     Types: Cigarettes     Passive exposure: Current    Smokeless tobacco: Never    Tobacco comments:     Quit 10/26/2023   Vaping Use    Vaping Use: Never used   Substance and Sexual Activity    Alcohol use: Yes     Comment: occas    Drug use: No    Sexual activity: Yes     Partners: Female     Birth control/protection: None   Other Topics Concern    Parent/sibling w/ CABG, MI or angioplasty before 65F 55M? No     Social Determinants of Health     Financial Resource Strain: Low Risk  (11/23/2023)    Financial Resource Strain     Within the past 12 months, have you or your family members you live with been unable to get utilities (heat, electricity) when it was really needed?: No   Food Insecurity: Low Risk  (11/23/2023)    Food Insecurity     Within the past 12 months, did you worry that your food would run out before you got money to buy more?: No     Within the past 12 months, did the food you bought just not last and you didn t have money to get more?: No   Transportation Needs: Low Risk   (11/23/2023)    Transportation Needs     Within the past 12 months, has lack of transportation kept you from medical appointments, getting your medicines, non-medical meetings or appointments, work, or from getting things that you need?: No   Physical Activity: Inactive (11/16/2023)    Exercise Vital Sign     Days of Exercise per Week: 0 days     Minutes of Exercise per Session: 0 min   Social Connections: Unknown (11/16/2023)    Social Connection and Isolation Panel [NHANES]     Marital Status:    Interpersonal Safety: Low Risk  (10/9/2023)    Interpersonal Safety     Do you feel physically and emotionally safe where you currently live?: Yes     Within the past 12 months, have you been hit, slapped, kicked or otherwise physically hurt by someone?: No     Within the past 12 months, have you been humiliated or emotionally abused in other ways by your partner or ex-partner?: No   Housing Stability: Low Risk  (11/23/2023)    Housing Stability     Do you have housing? : Yes     Are you worried about losing your housing?: No       CURRENT MEDICATIONS:  losartan (COZAAR) 25 MG tablet, Take 0.5 tablets (12.5 mg) by mouth daily  metoprolol succinate ER (TOPROL XL) 25 MG 24 hr tablet, Take 0.5 tablets (12.5 mg) by mouth daily  acetaminophen (TYLENOL) 325 MG tablet, Take 2 tablets (650 mg) by mouth every 6 hours as needed (Patient not taking: Reported on 11/24/2023)  amiodarone (PACERONE) 200 MG tablet, Take 1 tablet (200 mg) by mouth daily  apixaban ANTICOAGULANT (ELIQUIS) 5 MG tablet, Take 1 tablet (5 mg) by mouth 2 times daily  atorvastatin (LIPITOR) 80 MG tablet, Take 1 tablet (80 mg) by mouth daily (Patient taking differently: Take 80 mg by mouth at bedtime)  cetirizine (ZYRTEC) 10 MG tablet, Take 10 mg by mouth daily  clopidogrel (PLAVIX) 75 MG tablet, Take 1 tablet (75 mg) by mouth daily  escitalopram (LEXAPRO) 10 MG tablet, Take 1 tablet (10 mg) by mouth daily for 30 days  fish oil-omega-3 fatty acids 1000 MG  "capsule, Take 1 g by mouth 2 times daily Also contains another supplement  furosemide (LASIX) 20 MG tablet, Take 1 tablet (20 mg) by mouth daily (Patient not taking: Reported on 11/24/2023)  melatonin 10 MG TABS tablet, Take 1 tablet (10 mg) by mouth at bedtime  multivitamin w/minerals (THERA-VIT-M) tablet, Take 1 tablet by mouth daily  vitamin C (ASCORBIC ACID) 1000 MG TABS, Take 1,000 mg by mouth daily    No current facility-administered medications on file prior to visit.      ROS:   Refer to HPI    EXAM:  Ht 1.671 m (5' 5.79\")   Wt 64 kg (141 lb 1.6 oz)   BMI 22.92 kg/m    /72 (BP Location: Right arm, Patient Position: Sitting, Cuff Size: Adult Small)   Pulse 50   Ht 1.671 m (5' 5.79\")   Wt 64 kg (141 lb 1.6 oz)   SpO2 96%   BMI 22.92 kg/m     GENERAL: Appears comfortable, in no acute distress.   HEENT: Eye symmetrical, no discharge or icterus bilaterally. Mucous membranes moist and without lesions.  CV: RRR, +S1S2, no murmur, rub, or gallop. JVP < 6cm.   RESPIRATORY: Respirations regular, even, and unlabored. Lungs CTA throughout.   GI: Soft and non distended with normoactive bowel sounds present in all quadrants. No tenderness, rebound, guarding. No hepatomegaly.   EXTREMITIES: no peripheral edema. 2+ bilateral pedal pulses.   NEUROLOGIC: Alert and oriented x 3. No focal deficits.   MUSCULOSKELETAL: No joint swelling or tenderness.   SKIN: No jaundice. No rashes or lesions.     Labs, reviewed with patient in clinic today:  CBC RESULTS:  Lab Results   Component Value Date    WBC 5.7 11/22/2023    WBC 12.2 (H) 05/26/2021    RBC 3.78 (L) 11/22/2023    RBC 3.60 (L) 05/26/2021    HGB 12.5 (L) 11/22/2023    HGB 12.0 (L) 05/26/2021    HCT 36.8 (L) 11/22/2023    HCT 34.8 (L) 05/26/2021    MCV 97 11/22/2023    MCV 97 05/26/2021    MCH 33.1 (H) 11/22/2023    MCH 33.3 (H) 05/26/2021    MCHC 34.0 11/22/2023    MCHC 34.5 05/26/2021    RDW 13.9 11/22/2023    RDW 13.2 05/26/2021     11/22/2023     " 05/26/2021       CMP RESULTS:  Lab Results   Component Value Date     11/22/2023     10/27/2023     05/26/2021    POTASSIUM 3.8 11/22/2023    POTASSIUM 4.1 10/27/2023    POTASSIUM 4.5 05/26/2021    CHLORIDE 96 (L) 11/22/2023    CHLORIDE 103 10/27/2023    CHLORIDE 105 05/26/2021    CO2 27 11/22/2023    CO2 27 10/27/2023    CO2 29 05/26/2021    ANIONGAP 12 11/22/2023    ANIONGAP 7 05/26/2021    GLC 86 11/22/2023     (H) 11/01/2023     (H) 05/26/2021    BUN 14.9 11/22/2023    BUN 41 (H) 05/26/2021    CR 1.06 11/22/2023    CR 0.80 05/26/2021    GFRESTIMATED 74 11/22/2023    GFRESTIMATED >60 10/26/2023    GFRESTIMATED 90 05/26/2021    GFRESTBLACK >90 05/26/2021    PRANAV 9.2 11/22/2023    PRNAAV 8.3 (L) 05/26/2021    BILITOTAL 0.4 11/22/2023    BILITOTAL 0.5 03/20/2018    ALBUMIN 3.9 11/22/2023    ALBUMIN 3.7 03/20/2018    ALKPHOS 123 11/22/2023    ALKPHOS 82 03/20/2018    ALT 63 11/22/2023    ALT 22 03/20/2018    AST 40 11/22/2023    AST 20 03/20/2018        INR RESULTS:  Lab Results   Component Value Date    INR 1.39 (H) 11/07/2023    INR 1.15 (H) 05/26/2021       Lab Results   Component Value Date    MAG 2.2 11/15/2023     Lab Results   Component Value Date    NTBNPI 14,566 (H) 11/13/2023     Lab Results   Component Value Date    NTBNP 8,049 (H) 11/20/2023       Cardiac Diagnostics:    10/30/2023 Echo  Interpretation Summary  Ischemic CM. Large LAD MI. Left ventricular function is decreased. The  ejection fraction is 20-30% (severely reduced).  Global right ventricular function is normal.  No significant valvular abnormalities present.  IVC diameter <2.1 cm collapsing >50% with sniff suggests a normal RA pressure  of 3 mmHg.  No pericardial effusion is present.  No significant changes noted.      Assessment and Plan:   Mr. Aguila is a 73 year old male with medical history pertinent for anterior STEMI s/p PCI to the pLAD on 10/26/23 with a VT/VF arrest the next day (10/27) with patent stents  and unchanged anatomy on repeat angiogram. Placed on amiodarone and discharged 11/03/23, ICD was not indicated as this was within 48h of his MI. His EF prior do discharge was 20-30% with a large area of akinesis in the LAD placed on apixaban due to this. He presents to [facility] clinic for enrollment.     Appearing and feeling well. Euvolemic by exam. Blood pressures are low stable with SBPs ranging mid-90s to 110s. Has been following hold parameters for losartan and metoprolol. Often he does not take losartan for SBP < 110 and some days he will also hold metoprolol for SBP < 100. Despite low BPs, he feels completely fine. Labs are stable with normal lytes and renal function. NTproBNP still elevated at 5k, however trending down. We discussed the goals of CORE clinic, as well as the goals of GDMT. Due to low BP, we have been limited in up-titration of his GDMT. For today, I will liberalize hold parameters for losartan and metoprolol. Plan to hold losartan for SBP < 100 and hold metoprolol for SBP < 90. Hopeful that with liberalization we may be able to get Duane to take meds more consistently.       # Chronic systolic heart failure/HFrEF secondary to ICM    Stage C. NYHA Class II.    Fluid status: euvolemic  ACEi/ARB/ARNi/afterload reduction: losartan 12.5 mg daily, adjusted hold parameter for SBP < 100  BB: metoprolol succinate 12.5 mg daily, adjust hold parameters for SBP < 90, HR < 50  Aldosterone antagonist: deferred while other medical therapy is prioritized  SGLT2i: deferred while other medical therapy is prioritized  SCD prophylaxis: decision deferred during medication uptitration  NSAID use: contraindicated  Sleep apnea evaluation:  eval with sleep medicine 3/12/24    # CAD s/p PCI to LAD  # STEMI  # VT/VF arrest  - on plavix and apixaban   - cardiac MRI; pending EF and no thrombus can consider stopping eliquis and replacing with ASA 81 mg daily         Follow up:  - CORE in 4-6 weeks         Sirisha Arias DNP,  NP-C  Advance Heart Failure  11/30/23

## 2023-11-30 NOTE — PROGRESS NOTES
[facility]   [facility] Clinic      HPI:   Mr. Aguila is a 73 year old male with medical history pertinent for anterior STEMI s/p PCI to the pLAD on 10/26/23 with a VT/VF arrest the next day (10/27) with patent stents and unchanged anatomy on repeat angiogram. Placed on amiodarone and discharged 11/03/23, ICD was not indicated as this was within 48h of his MI. His EF prior do discharge was 20-30% with a large area of akinesis in the LAD placed on apixaban due to this. He presents to CORE clinic for enrollment.      Mr. Aguila was readmitted 11/6-11/20/23 with ADHF and treated with IV lasix. He had some lightheadedness and his metoprolol dose was adjusted. Brillinta changed to the plavix due to SOB. Seen in the ED on 11/22/23 for hypotension with BPs 80s/60s. Holding lasix, metoprolol, and losartan. Of note, he was asymptomatic with hypotension.      Today, Mr. Aguila presents to clinic with his wife. He reports feeling well. Since hospital discharge he has been gradually increasing his activity. [place] and takes daily walks. He has a short loop and a long loop that he walks. He estimates that he can walk 20-25 minutes continuously with only mild DIOP at the end. Denies CP, palpitations, lightheadedness, syncope. Denies orthopnea, PND, LE edema. Does note that he's sleeping more. Naps anywhere from 1-3 hours and sleeps well throughout the night.   Wife closely montioors sodium and fluid intake.     Monitors daily BPs, 100-112/60s  Holding losartan for SBP < 110   Holding Metoprolol for SBP < 100  Weight 138-140 lb    Cardiac Medications   - Amiodarone 200 mg daily   - Eliquis 5 mg BID  - Atorvastatin 80 mg daily   - Plavix 75 mg daily   - Lasix 20 mg daily-holding  - Losartan 12.5 mg daily-holding   - Metoprolol succinate 12.5 mg daily-holding       PAST MEDICAL HISTORY:  Past Medical History:   Diagnosis Date    Benign essential hypertension     CAD (coronary artery disease)     Chronic  systolic heart failure (H)     Hypertension     ST elevation myocardial infarction (STEMI), unspecified artery (H)     Ventricular fibrillation (H)        FAMILY HISTORY:  Family History   Problem Relation Age of Onset    Other Cancer Mother     Obesity Mother     Cervical Cancer Sister     GI problems Father     Other Cancer Sister        SOCIAL HISTORY:  Social History     Socioeconomic History    Marital status:      Spouse name: None    Number of children: None    Years of education: None    Highest education level: None   Tobacco Use    Smoking status: Former     Packs/day: .25     Types: Cigarettes     Passive exposure: Current    Smokeless tobacco: Never    Tobacco comments:     Quit 10/26/2023   Vaping Use    Vaping Use: Never used   Substance and Sexual Activity    Alcohol use: Yes     Comment: occas    Drug use: No    Sexual activity: Yes     Partners: Female     Birth control/protection: None   Other Topics Concern    Parent/sibling w/ CABG, MI or angioplasty before 65F 55M? No     Social Determinants of Health     Financial Resource Strain: Low Risk  (11/23/2023)    Financial Resource Strain     Within the past 12 months, have you or your family members you live with been unable to get utilities (heat, electricity) when it was really needed?: No   Food Insecurity: Low Risk  (11/23/2023)    Food Insecurity     Within the past 12 months, did you worry that your food would run out before you got money to buy more?: No     Within the past 12 months, did the food you bought just not last and you didn t have money to get more?: No   Transportation Needs: Low Risk  (11/23/2023)    Transportation Needs     Within the past 12 months, has lack of transportation kept you from medical appointments, getting your medicines, non-medical meetings or appointments, work, or from getting things that you need?: No   Physical Activity: Inactive (11/16/2023)    Exercise Vital Sign     Days of Exercise per Week: 0 days      Minutes of Exercise per Session: 0 min   Social Connections: Unknown (11/16/2023)    Social Connection and Isolation Panel [NHANES]     Marital Status:    Interpersonal Safety: Low Risk  (10/9/2023)    Interpersonal Safety     Do you feel physically and emotionally safe where you currently live?: Yes     Within the past 12 months, have you been hit, slapped, kicked or otherwise physically hurt by someone?: No     Within the past 12 months, have you been humiliated or emotionally abused in other ways by your partner or ex-partner?: No   Housing Stability: Low Risk  (11/23/2023)    Housing Stability     Do you have housing? : Yes     Are you worried about losing your housing?: No       CURRENT MEDICATIONS:  losartan (COZAAR) 25 MG tablet, Take 0.5 tablets (12.5 mg) by mouth daily  metoprolol succinate ER (TOPROL XL) 25 MG 24 hr tablet, Take 0.5 tablets (12.5 mg) by mouth daily  acetaminophen (TYLENOL) 325 MG tablet, Take 2 tablets (650 mg) by mouth every 6 hours as needed (Patient not taking: Reported on 11/24/2023)  amiodarone (PACERONE) 200 MG tablet, Take 1 tablet (200 mg) by mouth daily  apixaban ANTICOAGULANT (ELIQUIS) 5 MG tablet, Take 1 tablet (5 mg) by mouth 2 times daily  atorvastatin (LIPITOR) 80 MG tablet, Take 1 tablet (80 mg) by mouth daily (Patient taking differently: Take 80 mg by mouth at bedtime)  cetirizine (ZYRTEC) 10 MG tablet, Take 10 mg by mouth daily  clopidogrel (PLAVIX) 75 MG tablet, Take 1 tablet (75 mg) by mouth daily  escitalopram (LEXAPRO) 10 MG tablet, Take 1 tablet (10 mg) by mouth daily for 30 days  fish oil-omega-3 fatty acids 1000 MG capsule, Take 1 g by mouth 2 times daily Also contains another supplement  furosemide (LASIX) 20 MG tablet, Take 1 tablet (20 mg) by mouth daily (Patient not taking: Reported on 11/24/2023)  melatonin 10 MG TABS tablet, Take 1 tablet (10 mg) by mouth at bedtime  multivitamin w/minerals (THERA-VIT-M) tablet, Take 1 tablet by mouth daily  vitamin  "C (ASCORBIC ACID) 1000 MG TABS, Take 1,000 mg by mouth daily    No current facility-administered medications on file prior to visit.      ROS:   Refer to HPI    EXAM:  Ht 1.671 m (5' 5.79\")   Wt 64 kg (141 lb 1.6 oz)   BMI 22.92 kg/m    /72 (BP Location: Right arm, Patient Position: Sitting, Cuff Size: Adult Small)   Pulse 50   Ht 1.671 m (5' 5.79\")   Wt 64 kg (141 lb 1.6 oz)   SpO2 96%   BMI 22.92 kg/m     GENERAL: Appears comfortable, in no acute distress.   HEENT: Eye symmetrical, no discharge or icterus bilaterally. Mucous membranes moist and without lesions.  CV: RRR, +S1S2, no murmur, rub, or gallop. JVP < 6cm.   RESPIRATORY: Respirations regular, even, and unlabored. Lungs CTA throughout.   GI: Soft and non distended with normoactive bowel sounds present in all quadrants. No tenderness, rebound, guarding. No hepatomegaly.   EXTREMITIES: no peripheral edema. 2+ bilateral pedal pulses.   NEUROLOGIC: Alert and oriented x 3. No focal deficits.   MUSCULOSKELETAL: No joint swelling or tenderness.   SKIN: No jaundice. No rashes or lesions.     Labs, reviewed with patient in clinic today:  CBC RESULTS:  Lab Results   Component Value Date    WBC 5.7 11/22/2023    WBC 12.2 (H) 05/26/2021    RBC 3.78 (L) 11/22/2023    RBC 3.60 (L) 05/26/2021    HGB 12.5 (L) 11/22/2023    HGB 12.0 (L) 05/26/2021    HCT 36.8 (L) 11/22/2023    HCT 34.8 (L) 05/26/2021    MCV 97 11/22/2023    MCV 97 05/26/2021    MCH 33.1 (H) 11/22/2023    MCH 33.3 (H) 05/26/2021    MCHC 34.0 11/22/2023    MCHC 34.5 05/26/2021    RDW 13.9 11/22/2023    RDW 13.2 05/26/2021     11/22/2023     05/26/2021       CMP RESULTS:  Lab Results   Component Value Date     11/22/2023     10/27/2023     05/26/2021    POTASSIUM 3.8 11/22/2023    POTASSIUM 4.1 10/27/2023    POTASSIUM 4.5 05/26/2021    CHLORIDE 96 (L) 11/22/2023    CHLORIDE 103 10/27/2023    CHLORIDE 105 05/26/2021    CO2 27 11/22/2023    CO2 27 10/27/2023    CO2 " 29 05/26/2021    ANIONGAP 12 11/22/2023    ANIONGAP 7 05/26/2021    GLC 86 11/22/2023     (H) 11/01/2023     (H) 05/26/2021    BUN 14.9 11/22/2023    BUN 41 (H) 05/26/2021    CR 1.06 11/22/2023    CR 0.80 05/26/2021    GFRESTIMATED 74 11/22/2023    GFRESTIMATED >60 10/26/2023    GFRESTIMATED 90 05/26/2021    GFRESTBLACK >90 05/26/2021    PRANAV 9.2 11/22/2023    PRANAV 8.3 (L) 05/26/2021    BILITOTAL 0.4 11/22/2023    BILITOTAL 0.5 03/20/2018    ALBUMIN 3.9 11/22/2023    ALBUMIN 3.7 03/20/2018    ALKPHOS 123 11/22/2023    ALKPHOS 82 03/20/2018    ALT 63 11/22/2023    ALT 22 03/20/2018    AST 40 11/22/2023    AST 20 03/20/2018        INR RESULTS:  Lab Results   Component Value Date    INR 1.39 (H) 11/07/2023    INR 1.15 (H) 05/26/2021       Lab Results   Component Value Date    MAG 2.2 11/15/2023     Lab Results   Component Value Date    NTBNPI 14,566 (H) 11/13/2023     Lab Results   Component Value Date    NTBNP 8,049 (H) 11/20/2023       Cardiac Diagnostics:    10/30/2023 Echo  Interpretation Summary  Ischemic CM. Large LAD MI. Left ventricular function is decreased. The  ejection fraction is 20-30% (severely reduced).  Global right ventricular function is normal.  No significant valvular abnormalities present.  IVC diameter <2.1 cm collapsing >50% with sniff suggests a normal RA pressure  of 3 mmHg.  No pericardial effusion is present.  No significant changes noted.      Assessment and Plan:   Mr. Aguila is a 73 year old male with medical history pertinent for anterior STEMI s/p PCI to the pLAD on 10/26/23 with a VT/VF arrest the next day (10/27) with patent stents and unchanged anatomy on repeat angiogram. Placed on amiodarone and discharged 11/03/23, ICD was not indicated as this was within 48h of his MI. His EF prior do discharge was 20-30% with a large area of akinesis in the LAD placed on apixaban due to this. He presents to C[facility] clinic for enrollment.     Appearing and feeling well. Euvolemic by  exam. Blood pressures are low stable with SBPs ranging mid-90s to 110s. Has been following hold parameters for losartan and metoprolol. Often he does not take losartan for SBP < 110 and some days he will also hold metoprolol for SBP < 100. Despite low BPs, he feels completely fine. Labs are stable with normal lytes and renal function. NTproBNP still elevated at 5k, however trending down. We discussed the goals of CORE clinic, as well as the goals of GDMT. Due to low BP, we have been limited in up-titration of his GDMT. For today, I will liberalize hold parameters for losartan and metoprolol. Plan to hold losartan for SBP < 100 and hold metoprolol for SBP < 90. Hopeful that with liberalization we may be able to get Duane to take meds more consistently.       # Chronic systolic heart failure/HFrEF secondary to ICM    Stage C. NYHA Class II.    Fluid status: euvolemic  ACEi/ARB/ARNi/afterload reduction: losartan 12.5 mg daily, adjusted hold parameter for SBP < 100  BB: metoprolol succinate 12.5 mg daily, adjust hold parameters for SBP < 90, HR < 50  Aldosterone antagonist: deferred while other medical therapy is prioritized  SGLT2i: deferred while other medical therapy is prioritized  SCD prophylaxis: decision deferred during medication uptitration  NSAID use: contraindicated  Sleep apnea evaluation:  eval with sleep medicine 3/12/24    # CAD s/p PCI to LAD  # STEMI  # VT/VF arrest  - on plavix and apixaban   - cardiac MRI; pending EF and no thrombus can consider stopping eliquis and replacing with ASA 81 mg daily         Follow up:  - CORE in 4-6 weeks         Sirisha Arias DNP, NP-C  Advance Heart Failure  11/30/23

## 2023-11-30 NOTE — NURSING NOTE
Chief Complaint   Patient presents with    New Patient     NEW core enrollment for HF with severely reduced EF (25-30%) for medical management of medications with labs prior.       Vitals were taken, medications reviewed.    Sirisha Tolliver, EMT   3:36 PM

## 2023-11-30 NOTE — PATIENT INSTRUCTIONS
Take your medicines every day, as directed    Changes made today:  Hold metoprolol succinate for systolic blood pressure < 90 and or HR < 45, switch metoprolol to bedtime  Hold losartan for systolic blood pressure < 100   Monitor Your Weight and Symptoms    Contact us if you:    Gain 2 pounds in one day or 5 pounds in one week  Feel more short of breath  Notice more leg swelling  Feel lightheadeded   Change your lifestyle    Limit Salt or Sodium:  2000 mg  Limit Fluids:  2000 mL or approximately 64 ounces  Eat a Heart Healthy Diet  Low in saturated fats  Stay Active:  Aim to move at least 150 minutes every  week         To Contact us    During Business Hours:  [phone], option # 1      After hours, weekends or holidays:   [phone], Option #4  Ask to speak to the On-Call Cardiologist. Inform them you are a CORE/heart failure patient at the [facility].     Use [employer] allows you to communicate directly with your heart team through secure messaging.  [employer] can be accessed any time on your phone, computer, or tablet.  If you need assistance, we'd be happy to help!         Keep your Heart Appointments:    CORE in 4-6 weeks         Please consider attending our virtual support group which is held monthly. Please reach out to Rajesh at [phone] for more information if you are interested in attending.       2023 dates:      - Monday, December 4th, 1-2pm

## 2023-12-04 DIAGNOSIS — F41.9 ANXIETY: ICD-10-CM

## 2023-12-06 ENCOUNTER — HOSPITAL ENCOUNTER (OUTPATIENT)
Dept: MRI IMAGING | Facility: CLINIC | Age: 73
Discharge: HOME | End: 2023-12-06
Attending: NURSE PRACTITIONER | Admitting: NURSE PRACTITIONER
Payer: MEDICARE

## 2023-12-06 DIAGNOSIS — I50.21 ACUTE SYSTOLIC HEART FAILURE (H): ICD-10-CM

## 2023-12-06 PROCEDURE — A9585 GADOBUTROL INJECTION: HCPCS | Mod: JZ | Performed by: NURSE PRACTITIONER

## 2023-12-06 PROCEDURE — 75561 CARDIAC MRI FOR MORPH W/DYE: CPT | Mod: 26 | Performed by: INTERNAL MEDICINE

## 2023-12-06 PROCEDURE — 255N000002 HC RX 255 OP 636: Mod: JZ | Performed by: NURSE PRACTITIONER

## 2023-12-06 PROCEDURE — G1010 CDSM STANSON: HCPCS

## 2023-12-06 PROCEDURE — G1010 CDSM STANSON: HCPCS | Performed by: INTERNAL MEDICINE

## 2023-12-06 RX ORDER — ESCITALOPRAM OXALATE 10 MG/1
10 TABLET ORAL DAILY
Qty: 30 TABLET | Refills: 1 | Status: SHIPPED | OUTPATIENT
Start: 2023-12-06 | End: 2024-01-02

## 2023-12-06 RX ORDER — GADOBUTROL 604.72 MG/ML
10 INJECTION INTRAVENOUS ONCE
Status: COMPLETED | OUTPATIENT
Start: 2023-12-06 | End: 2023-12-06

## 2023-12-06 RX ADMIN — GADOBUTROL 9 ML: 604.72 INJECTION INTRAVENOUS at 13:22

## 2023-12-08 DIAGNOSIS — I21.02 ST ELEVATION MYOCARDIAL INFARCTION INVOLVING LEFT ANTERIOR DESCENDING (LAD) CORONARY ARTERY (H): ICD-10-CM

## 2023-12-11 ENCOUNTER — TELEPHONE (OUTPATIENT)
Dept: CARDIOLOGY | Facility: CLINIC | Age: 73
End: 2023-12-11
Payer: MEDICARE

## 2023-12-11 RX ORDER — ATORVASTATIN CALCIUM 80 MG/1
80 TABLET, FILM COATED ORAL DAILY
Qty: 90 TABLET | Refills: 3 | Status: ON HOLD | OUTPATIENT
Start: 2023-12-11

## 2023-12-11 NOTE — TELEPHONE ENCOUNTER
"Spoke with Duane and Melissa and let them know that Dr. Hobbs has reviewed the cMRI and states. \" Looked at the MRI and that segment looks almost aneurysmal to me so I would err on keeping him on the apixaban.\"    Duane and Shanita are in agreement with the plan to keep him on apixaban.  "

## 2023-12-13 ENCOUNTER — HOSPITAL ENCOUNTER (OUTPATIENT)
Dept: CARDIAC REHAB | Facility: CLINIC | Age: 73
Discharge: HOME | End: 2023-12-13
Attending: STUDENT IN AN ORGANIZED HEALTH CARE EDUCATION/TRAINING PROGRAM
Payer: MEDICARE

## 2023-12-13 DIAGNOSIS — I46.9 CARDIAC ARREST (H): ICD-10-CM

## 2023-12-13 DIAGNOSIS — I50.20 HEART FAILURE WITH REDUCED EJECTION FRACTION, NYHA CLASS I (H): ICD-10-CM

## 2023-12-13 DIAGNOSIS — I25.750 CORONARY ARTERY DISEASE INVOLVING NATIVE ARTERY OF TRANSPLANTED HEART WITH UNSTABLE ANGINA PECTORIS (H): ICD-10-CM

## 2023-12-13 PROCEDURE — 93797 PHYS/QHP OP CAR RHAB WO ECG: CPT | Mod: 59

## 2023-12-13 PROCEDURE — 93798 PHYS/QHP OP CAR RHAB W/ECG: CPT

## 2023-12-18 ENCOUNTER — HOSPITAL ENCOUNTER (EMERGENCY)
Facility: CLINIC | Age: 73
Discharge: HOME | End: 2023-12-18
Attending: EMERGENCY MEDICINE | Admitting: EMERGENCY MEDICINE
Payer: MEDICARE

## 2023-12-18 ENCOUNTER — HOSPITAL ENCOUNTER (OUTPATIENT)
Dept: CARDIAC REHAB | Facility: CLINIC | Age: 73
Discharge: HOME | End: 2023-12-18
Attending: INTERNAL MEDICINE
Payer: MEDICARE

## 2023-12-18 ENCOUNTER — APPOINTMENT (OUTPATIENT)
Dept: GENERAL RADIOLOGY | Facility: CLINIC | Age: 73
End: 2023-12-18
Attending: EMERGENCY MEDICINE
Payer: MEDICARE

## 2023-12-18 VITALS
DIASTOLIC BLOOD PRESSURE: 79 MMHG | OXYGEN SATURATION: 92 % | TEMPERATURE: 97.8 F | HEART RATE: 60 BPM | RESPIRATION RATE: 18 BRPM | SYSTOLIC BLOOD PRESSURE: 123 MMHG | HEIGHT: 65 IN | WEIGHT: 141.1 LBS | BODY MASS INDEX: 23.51 KG/M2

## 2023-12-18 DIAGNOSIS — R06.02 SOB (SHORTNESS OF BREATH): ICD-10-CM

## 2023-12-18 LAB
ANION GAP SERPL CALCULATED.3IONS-SCNC: 12 MMOL/L (ref 7–15)
BASOPHILS # BLD AUTO: 0.1 10E3/UL (ref 0–0.2)
BASOPHILS NFR BLD AUTO: 1 %
BUN SERPL-MCNC: 13 MG/DL (ref 8–23)
CALCIUM SERPL-MCNC: 8.9 MG/DL (ref 8.8–10.2)
CHLORIDE SERPL-SCNC: 94 MMOL/L (ref 98–107)
CREAT SERPL-MCNC: 0.92 MG/DL (ref 0.67–1.17)
DEPRECATED HCO3 PLAS-SCNC: 24 MMOL/L (ref 22–29)
EGFRCR SERPLBLD CKD-EPI 2021: 88 ML/MIN/1.73M2
EOSINOPHIL # BLD AUTO: 0.1 10E3/UL (ref 0–0.7)
EOSINOPHIL NFR BLD AUTO: 2 %
ERYTHROCYTE [DISTWIDTH] IN BLOOD BY AUTOMATED COUNT: 13.4 % (ref 10–15)
GLUCOSE SERPL-MCNC: 107 MG/DL (ref 70–99)
HCT VFR BLD AUTO: 39.2 % (ref 40–53)
HGB BLD-MCNC: 13.1 G/DL (ref 13.3–17.7)
HOLD SPECIMEN: NORMAL
IMM GRANULOCYTES # BLD: 0 10E3/UL
IMM GRANULOCYTES NFR BLD: 0 %
LYMPHOCYTES # BLD AUTO: 0.7 10E3/UL (ref 0.8–5.3)
LYMPHOCYTES NFR BLD AUTO: 10 %
MCH RBC QN AUTO: 31.4 PG (ref 26.5–33)
MCHC RBC AUTO-ENTMCNC: 33.4 G/DL (ref 31.5–36.5)
MCV RBC AUTO: 94 FL (ref 78–100)
MONOCYTES # BLD AUTO: 0.4 10E3/UL (ref 0–1.3)
MONOCYTES NFR BLD AUTO: 6 %
NEUTROPHILS # BLD AUTO: 5.3 10E3/UL (ref 1.6–8.3)
NEUTROPHILS NFR BLD AUTO: 81 %
NRBC # BLD AUTO: 0 10E3/UL
NRBC BLD AUTO-RTO: 0 /100
NT-PROBNP SERPL-MCNC: 8310 PG/ML (ref 0–900)
PLATELET # BLD AUTO: 288 10E3/UL (ref 150–450)
POTASSIUM SERPL-SCNC: 4 MMOL/L (ref 3.4–5.3)
RBC # BLD AUTO: 4.17 10E6/UL (ref 4.4–5.9)
SODIUM SERPL-SCNC: 130 MMOL/L (ref 135–145)
TROPONIN T SERPL HS-MCNC: 43 NG/L
WBC # BLD AUTO: 6.6 10E3/UL (ref 4–11)

## 2023-12-18 PROCEDURE — 80048 BASIC METABOLIC PNL TOTAL CA: CPT | Performed by: EMERGENCY MEDICINE

## 2023-12-18 PROCEDURE — 93010 ELECTROCARDIOGRAM REPORT: CPT | Performed by: EMERGENCY MEDICINE

## 2023-12-18 PROCEDURE — 99285 EMERGENCY DEPT VISIT HI MDM: CPT | Mod: 25 | Performed by: EMERGENCY MEDICINE

## 2023-12-18 PROCEDURE — 83880 ASSAY OF NATRIURETIC PEPTIDE: CPT | Performed by: EMERGENCY MEDICINE

## 2023-12-18 PROCEDURE — 93005 ELECTROCARDIOGRAM TRACING: CPT | Performed by: EMERGENCY MEDICINE

## 2023-12-18 PROCEDURE — 85025 COMPLETE CBC W/AUTO DIFF WBC: CPT | Performed by: EMERGENCY MEDICINE

## 2023-12-18 PROCEDURE — 93798 PHYS/QHP OP CAR RHAB W/ECG: CPT

## 2023-12-18 PROCEDURE — 36415 COLL VENOUS BLD VENIPUNCTURE: CPT | Performed by: EMERGENCY MEDICINE

## 2023-12-18 PROCEDURE — 71046 X-RAY EXAM CHEST 2 VIEWS: CPT

## 2023-12-18 PROCEDURE — 84484 ASSAY OF TROPONIN QUANT: CPT | Performed by: EMERGENCY MEDICINE

## 2023-12-18 PROCEDURE — 99284 EMERGENCY DEPT VISIT MOD MDM: CPT | Mod: 25 | Performed by: EMERGENCY MEDICINE

## 2023-12-18 ASSESSMENT — ACTIVITIES OF DAILY LIVING (ADL): ADLS_ACUITY_SCORE: 35

## 2023-12-18 NOTE — DISCHARGE INSTRUCTIONS
Continue home medications.    Labs and x-ray looked okay.    Follow-up in clinic as previously planned.

## 2023-12-18 NOTE — ED TRIAGE NOTES
Patient reports increasing shortness of breath for the past week. Hx of cardiac arrest and mi in October.     Triage Assessment (Adult)       Row Name 12/18/23 0141          Triage Assessment    Airway WDL WDL        Respiratory WDL    Respiratory WDL X;rhythm/pattern     Rhythm/Pattern, Respiratory shortness of breath        Skin Circulation/Temperature WDL    Skin Circulation/Temperature WDL WDL        Cardiac WDL    Cardiac WDL WDL        Peripheral/Neurovascular WDL    Peripheral Neurovascular WDL WDL        Cognitive/Neuro/Behavioral WDL    Cognitive/Neuro/Behavioral WDL WDL

## 2023-12-18 NOTE — ED PROVIDER NOTES
ED Provider Note  [facility]      History     Chief Complaint   Patient presents with    Shortness of Breath     HPI  Duane C Johnson is a 73 year old male who has medical history significant for coronary artery disease with STEMI on October 26, 2023 with V-fib/V. tach arrest the following day.  His ejection fraction prior to discharge was 20 to 30%, and patient was placed on Eliquis due to a large area of akinesis in the LAD.  Patient has been referred to the core clinic.  He does have cardiac rehab starting this week.    Patient has been increasing his activity, however is concerned because he has ongoing shortness of breath.  Upon chart review, on November 30, 2023, patient had estimated that he can walk 20 to 25 minutes continuously with only mild dyspnea on exertion at the end.  No orthopnea.  Patient reports that he is shortness of breath is not significantly changed compared to prior.  However, does have some ongoing shortness of breath, and slightly different type symptoms, and therefore he presents to the emergency department this evening.  No severe chest pains.  No lower extremity swelling.  No lightheadedness, dizziness.        Independent Historian:    Wife stating that patient's symptoms were slightly different this evening, and given the recent happenings requests evaluation.    Review of External Notes:  As above      Allergies:  Allergies   Allergen Reactions    Brilinta [Ticagrelor]      Per pt and spouse, hyperventilation, difficulty breathing    Clonazepam Other (See Comments)     Per spouse, acted like a zombie and he was shaky, could barely talk.       Problem List:    Patient Active Problem List    Diagnosis Date Noted    Anxiety 11/13/2023     Priority: Medium    Congestive heart failure, unspecified HF chronicity, unspecified heart failure type (H) 11/13/2023     Priority: Medium    ST elevation myocardial infarction involving left anterior descending (LAD)  coronary artery (H) 11/13/2023     Priority: Medium    Acute respiratory failure with hypoxia (H) 11/07/2023     Priority: Medium    Multifocal pneumonia 11/07/2023     Priority: Medium    Elevated brain natriuretic peptide (BNP) level 11/07/2023     Priority: Medium    Primary hypertension 11/07/2023     Priority: Medium    SOB (shortness of breath) 11/07/2023     Priority: Medium    Coronary artery disease due to calcified coronary lesion 11/07/2023     Priority: Medium    Pneumonia of left upper lobe due to infectious organism 11/07/2023     Priority: Medium    Systolic CHF (H) 11/07/2023     Priority: Medium    Hx of cardiac arrest 10/27/2023     Priority: Medium    ST elevation MI (STEMI) (H) 10/26/2023     Priority: Medium    Prostate cancer (H) 09/23/2023     Priority: Medium    Hyperlipidemia LDL goal <130 01/26/2016     Priority: Medium    Adiposity 05/06/2010     Priority: Medium    Tobacco dependence syndrome 01/23/2008     Priority: Medium        Past Medical History:    Past Medical History:   Diagnosis Date    Benign essential hypertension     CAD (coronary artery disease)     Chronic systolic heart failure (H)     Hypertension     ST elevation myocardial infarction (STEMI), unspecified artery (H)     Ventricular fibrillation (H)        Past Surgical History:    Past Surgical History:   Procedure Laterality Date    COLONOSCOPY N/A 3/23/2023    Procedure: COLONOSCOPY, FLEXIBLE, WITH LESION REMOVAL USING SNARE;  Surgeon: Jose Faustin MD;  Location: [facility]    CV CENTRAL VENOUS CATHETER PLACEMENT N/A 10/26/2023    Procedure: Central Venous Catheter Placement;  Surgeon: Rob Lyles MD;  Location: [facility] CARDIAC CATH LAB    CV CORONARY ANGIOGRAM N/A 10/27/2023    Procedure: Coronary Angiogram;  Surgeon: Rob Lyles MD;  Location: [facility] CARDIAC CATH LAB    CV CORONARY ANGIOGRAM N/A 10/26/2023    Procedure: Coronary Angiogram;  Surgeon: Rob Lyles MD;  Location:   HEART CARDIAC CATH LAB    CV LEFT HEART CATH N/A 10/26/2023    Procedure: Left Heart Catheterization;  Surgeon: Rob Lyles MD;  Location:  HEART CARDIAC CATH LAB    CV PCI N/A 10/27/2023    Procedure: Percutaneous Coronary Intervention;  Surgeon: Rob Lyles MD;  Location: [facility] CARDIAC CATH LAB    CV PCI STENT DRUG ELUTING N/A 10/26/2023    Procedure: Percutaneous Coronary Intervention Stent;  Surgeon: Rob Lyles MD;  Location:  HEART CARDIAC CATH LAB       Family History:    Family History   Problem Relation Age of Onset    Other Cancer Mother     Obesity Mother     Cervical Cancer Sister     GI problems Father     Other Cancer Sister        Social History:  Marital Status:   [2]  Social History     Tobacco Use    Smoking status: Former     Packs/day: .25     Types: Cigarettes     Passive exposure: Current    Smokeless tobacco: Never    Tobacco comments:     Quit 10/26/2023   Vaping Use    Vaping Use: Never used   Substance Use Topics    Alcohol use: Yes     Comment: occas    Drug use: No        Medications:    acetaminophen (TYLENOL) 325 MG tablet  amiodarone (PACERONE) 200 MG tablet  apixaban ANTICOAGULANT (ELIQUIS) 5 MG tablet  atorvastatin (LIPITOR) 80 MG tablet  cetirizine (ZYRTEC) 10 MG tablet  clopidogrel (PLAVIX) 75 MG tablet  escitalopram (LEXAPRO) 10 MG tablet  fish oil-omega-3 fatty acids 1000 MG capsule  furosemide (LASIX) 20 MG tablet  losartan (COZAAR) 25 MG tablet  melatonin 10 MG TABS tablet  metoprolol succinate ER (TOPROL XL) 25 MG 24 hr tablet  multivitamin w/minerals (THERA-VIT-M) tablet  vitamin C (ASCORBIC ACID) 1000 MG TABS          Review of Systems  A medically appropriate review of systems was performed with pertinent positives and negatives noted in the HPI, and all other systems negative.    Physical Exam   Patient Vitals for the past 24 hrs:   BP Temp Temp src Pulse Resp SpO2 Height Weight   12/18/23 0230 123/79 -- -- 60 -- 92 % -- --  "  12/18/23 0200 131/89 -- -- 66 -- 92 % -- --   12/18/23 0141 137/88 97.8  F (36.6  C) Oral 71 18 91 % 1.651 m (5' 5\") 64 kg (141 lb 1.6 oz)          Physical Exam  General: alert and in no acute distress on arrival  Head: atraumatic, normocephalic  Lungs:  nonlabored  CV:  extremities warm and perfused  Abd: nondistended  Skin: no rashes, no diaphoresis and skin color normal  Neuro: Patient awake, alert, speech is fluent,   Psychiatric: affect/mood normal,        ED Course                 Procedures         EKG, reviewed by myself shows normal sinus rhythm.  Rate 65 bpm.  Q waves anteriorly.  Nonspecific ST elevation in anterior leads.  No ectopy.  Normal intervals.  Unchanged compared to November 22, 2023                 Results for orders placed or performed during the hospital encounter of 12/18/23 (from the past 24 hour(s))   [place] Draw    Narrative    The following orders were created for panel order [place] Draw.  Procedure                               Abnormality         Status                     ---------                               -----------         ------                     Extra Blue Top Tube[[id number]]                              Final result               Extra Red Top Tube[[id number]]                               Final result               Extra Green Top (Lithium...[[id number]]                      Final result               Extra Purple Top Tube[[id number]]                            Final result                 Please view results for these tests on the individual orders.   Extra Blue Top Tube   Result Value Ref Range    Hold Specimen JIC    Extra Red Top Tube   Result Value Ref Range    Hold Specimen JIC    Extra Green Top (Lithium Heparin) Tube   Result Value Ref Range    Hold Specimen JIC    Extra Purple Top Tube   Result Value Ref Range    Hold Specimen JIC    CBC with platelets, differential    Narrative    The following orders were created for panel order CBC with platelets, " differential.  Procedure                               Abnormality         Status                     ---------                               -----------         ------                     CBC with platelets and d...[[id number]]  Abnormal            Final result                 Please view results for these tests on the individual orders.   Basic metabolic panel   Result Value Ref Range    Sodium 130 (L) 135 - 145 mmol/L    Potassium 4.0 3.4 - 5.3 mmol/L    Chloride 94 (L) 98 - 107 mmol/L    Carbon Dioxide (CO2) 24 22 - 29 mmol/L    Anion Gap 12 7 - 15 mmol/L    Urea Nitrogen 13.0 8.0 - 23.0 mg/dL    Creatinine 0.92 0.67 - 1.17 mg/dL    GFR Estimate 88 >60 mL/min/1.73m2    Calcium 8.9 8.8 - 10.2 mg/dL    Glucose 107 (H) 70 - 99 mg/dL   Troponin T, High Sensitivity   Result Value Ref Range    Troponin T, High Sensitivity 43 (H) <=22 ng/L   Nt probnp inpatient   Result Value Ref Range    N terminal Pro BNP Inpatient 8,310 (H) 0 - 900 pg/mL   CBC with platelets and differential   Result Value Ref Range    WBC Count 6.6 4.0 - 11.0 10e3/uL    RBC Count 4.17 (L) 4.40 - 5.90 10e6/uL    Hemoglobin 13.1 (L) 13.3 - 17.7 g/dL    Hematocrit 39.2 (L) 40.0 - 53.0 %    MCV 94 78 - 100 fL    MCH 31.4 26.5 - 33.0 pg    MCHC 33.4 31.5 - 36.5 g/dL    RDW 13.4 10.0 - 15.0 %    Platelet Count 288 150 - 450 10e3/uL    % Neutrophils 81 %    % Lymphocytes 10 %    % Monocytes 6 %    % Eosinophils 2 %    % Basophils 1 %    % Immature Granulocytes 0 %    NRBCs per 100 WBC 0 <1 /100    Absolute Neutrophils 5.3 1.6 - 8.3 10e3/uL    Absolute Lymphocytes 0.7 (L) 0.8 - 5.3 10e3/uL    Absolute Monocytes 0.4 0.0 - 1.3 10e3/uL    Absolute Eosinophils 0.1 0.0 - 0.7 10e3/uL    Absolute Basophils 0.1 0.0 - 0.2 10e3/uL    Absolute Immature Granulocytes 0.0 <=0.4 10e3/uL    Absolute NRBCs 0.0 10e3/uL   Chest XR,  PA & LAT    Narrative    EXAM: XR CHEST 2 VIEWS  LOCATION: [facility]  DATE: 12/18/2023    INDICATION:  sob  COMPARISON: 11/12/2023      Impression    IMPRESSION: Again noted bilateral interstitial and groundglass density infiltrates. Pulmonary vascular congestion. Small bilateral pleural effusions posteriorly, more apparent on the left. Mildly atherosclerotic thoracic aorta. Degenerative changes both   shoulders and the spine.        MEDICATIONS GIVEN IN THE EMERGENCY DEPARTMENT:  Medications - No data to display        Independent Interpretation (X-rays, CTs, rhythm strip):  X-ray with no infiltrate    Consultations/Discussion of Management or Tests:  None       Social Determinants of Health affecting care:         Assessments & Plan (with Medical Decision Making)  73 year old male who presents to the Emergency Department for evaluation of shortness of breath.  Patient has had some slight increase shortness of breath over the past 1 week.  Notable history significant for cardiac arrest after patient had STEMI in October of this year.  Had anterior wall akinesis upon discharge, with decreased ejection fraction and therefore patient had been started on Eliquis.  Therefore, I do feel that this is less likely pulmonary embolism since patient is currently anticoagulated with dual antiplatelet therapy.  X-ray is otherwise clear, with no signs of pneumonia.  Laboratory workup does have elevated BNP, however this is actually improved compared to prior.  Basic metabolic panel with slight hyponatremia, and otherwise normal electrolytes.  CBC without significant anemia to contribute to his shortness of breath.    Patient has acute exacerbation of his chronic shortness of breath.  Consideration for additional hospitalization for observation, however patient is feeling somewhat improved, and feels like he can manage at home.  He does begin with cardiac rehab tomorrow, and I feel that patient is recovering from his new norm given his severe cardiac arrest and complications from a couple months ago.  Patient encouraged to return  or be seen if new or worsening symptoms develop.       I have reviewed the nursing notes.    I have reviewed the findings, diagnosis, plan and need for follow up with the patient.       Critical Care time:  none      NEW PRESCRIPTIONS STARTED AT TODAY'S ER VISIT  Discharge Medication List as of 12/18/2023  3:32 AM          Final diagnoses:   SOB (shortness of breath)       12/18/2023   [facility] EMERGENCY DEPT       Guero Vivar MD  12/18/23 0354

## 2023-12-19 ENCOUNTER — PATIENT OUTREACH (OUTPATIENT)
Dept: CARDIOLOGY | Facility: CLINIC | Age: 73
End: 2023-12-19
Payer: MEDICARE

## 2023-12-19 ENCOUNTER — PATIENT OUTREACH (OUTPATIENT)
Dept: CARE COORDINATION | Facility: CLINIC | Age: 73
End: 2023-12-19
Payer: MEDICARE

## 2023-12-19 NOTE — TELEPHONE ENCOUNTER
"Called Duane to follow up on his ED visit for being short of breath.   He was worked up in the ER and sent home with reassuring testing.      He woke up at 1 am \"quite short of breath and with cold sweats.\"    He took a lexapro last night and slept great and had no trouble breathing.      Denies weight gain, swelling, PND and describes shortness of breath just as a need to take a deeper breath.  O2 Sats are generally \"in the 90's\"- measured at 90-91 while talking to me.      He was able to go to cardiac rehab- although was quite tired and unable to finish.  He also reports sleeping a lot during the day.      Duane thinks this was likely anxiety.        "

## 2023-12-19 NOTE — PROGRESS NOTES
Clinic Care Coordination Contact    Situation: Patient chart reviewed by care coordinator.    Background: 12/18/2023 (2 hours)  [facility] Emergency Dept     Guero Vivar MD  Last attending  Treatment team SOB (shortness of breath)  Clinical impression Shortness of Breath  Chief complaint   Patient has acute exacerbation of his chronic shortness of breath.  Consideration for additional hospitalization for observation, however patient is feeling somewhat improved, and feels like he can manage at home.  He does begin with cardiac rehab tomorrow, and I feel that patient is recovering from his new norm given his severe cardiac arrest and complications from a couple months ago.  Patient encouraged to return or be seen if new or worsening symptoms develop.       Assessment: ED follow up call made by Cardiology today   Patient reports reassuring testing results Admits to some anxiety    Plan/Recommendations: No outreach made     [facility]   Ruthy Paiz RN, Care Coordinator   [facility]'s   E-mail [email or web address]   [phone]

## 2023-12-20 ENCOUNTER — TELEPHONE (OUTPATIENT)
Dept: CARDIOLOGY | Facility: CLINIC | Age: 73
End: 2023-12-20
Payer: MEDICARE

## 2023-12-20 ENCOUNTER — NURSE TRIAGE (OUTPATIENT)
Dept: CARDIOLOGY | Facility: CLINIC | Age: 73
End: 2023-12-20

## 2023-12-20 ENCOUNTER — APPOINTMENT (OUTPATIENT)
Dept: GENERAL RADIOLOGY | Facility: CLINIC | Age: 73
End: 2023-12-20
Attending: EMERGENCY MEDICINE
Payer: MEDICARE

## 2023-12-20 ENCOUNTER — HOSPITAL ENCOUNTER (EMERGENCY)
Facility: CLINIC | Age: 73
Discharge: HOME | End: 2023-12-20
Attending: EMERGENCY MEDICINE | Admitting: EMERGENCY MEDICINE
Payer: MEDICARE

## 2023-12-20 VITALS
SYSTOLIC BLOOD PRESSURE: 121 MMHG | HEART RATE: 60 BPM | OXYGEN SATURATION: 94 % | BODY MASS INDEX: 22.02 KG/M2 | DIASTOLIC BLOOD PRESSURE: 85 MMHG | HEIGHT: 66 IN | WEIGHT: 137 LBS | TEMPERATURE: 97.6 F | RESPIRATION RATE: 18 BRPM

## 2023-12-20 DIAGNOSIS — I50.9 CONGESTIVE HEART FAILURE, UNSPECIFIED HF CHRONICITY, UNSPECIFIED HEART FAILURE TYPE (H): ICD-10-CM

## 2023-12-20 DIAGNOSIS — Z86.74 HX OF CARDIAC ARREST: ICD-10-CM

## 2023-12-20 DIAGNOSIS — I50.9 CONGESTIVE HEART FAILURE, UNSPECIFIED HF CHRONICITY, UNSPECIFIED HEART FAILURE TYPE (H): Primary | ICD-10-CM

## 2023-12-20 DIAGNOSIS — I21.02 ST ELEVATION MYOCARDIAL INFARCTION INVOLVING LEFT ANTERIOR DESCENDING (LAD) CORONARY ARTERY (H): ICD-10-CM

## 2023-12-20 DIAGNOSIS — R07.9 ACUTE CHEST PAIN: ICD-10-CM

## 2023-12-20 DIAGNOSIS — I50.20 HEART FAILURE WITH REDUCED EJECTION FRACTION, NYHA CLASS I (H): ICD-10-CM

## 2023-12-20 LAB
ALBUMIN SERPL BCG-MCNC: 3.6 G/DL (ref 3.5–5.2)
ALP SERPL-CCNC: 115 U/L (ref 40–150)
ALT SERPL W P-5'-P-CCNC: 20 U/L (ref 0–70)
ANION GAP SERPL CALCULATED.3IONS-SCNC: 14 MMOL/L (ref 7–15)
AST SERPL W P-5'-P-CCNC: 23 U/L (ref 0–45)
BASOPHILS # BLD AUTO: 0.1 10E3/UL (ref 0–0.2)
BASOPHILS NFR BLD AUTO: 1 %
BILIRUB DIRECT SERPL-MCNC: <0.2 MG/DL (ref 0–0.3)
BILIRUB SERPL-MCNC: 0.6 MG/DL
BUN SERPL-MCNC: 9 MG/DL (ref 8–23)
CALCIUM SERPL-MCNC: 9.2 MG/DL (ref 8.8–10.2)
CHLORIDE SERPL-SCNC: 97 MMOL/L (ref 98–107)
CREAT SERPL-MCNC: 0.89 MG/DL (ref 0.67–1.17)
DEPRECATED HCO3 PLAS-SCNC: 22 MMOL/L (ref 22–29)
EGFRCR SERPLBLD CKD-EPI 2021: 90 ML/MIN/1.73M2
EOSINOPHIL # BLD AUTO: 0.1 10E3/UL (ref 0–0.7)
EOSINOPHIL NFR BLD AUTO: 1 %
ERYTHROCYTE [DISTWIDTH] IN BLOOD BY AUTOMATED COUNT: 13.4 % (ref 10–15)
GLUCOSE SERPL-MCNC: 94 MG/DL (ref 70–99)
HCT VFR BLD AUTO: 37.6 % (ref 40–53)
HGB BLD-MCNC: 12.9 G/DL (ref 13.3–17.7)
HOLD SPECIMEN: NORMAL
HOLD SPECIMEN: NORMAL
IMM GRANULOCYTES # BLD: 0 10E3/UL
IMM GRANULOCYTES NFR BLD: 0 %
LIPASE SERPL-CCNC: 21 U/L (ref 13–60)
LYMPHOCYTES # BLD AUTO: 1 10E3/UL (ref 0.8–5.3)
LYMPHOCYTES NFR BLD AUTO: 17 %
MCH RBC QN AUTO: 32 PG (ref 26.5–33)
MCHC RBC AUTO-ENTMCNC: 34.3 G/DL (ref 31.5–36.5)
MCV RBC AUTO: 93 FL (ref 78–100)
MONOCYTES # BLD AUTO: 0.6 10E3/UL (ref 0–1.3)
MONOCYTES NFR BLD AUTO: 10 %
NEUTROPHILS # BLD AUTO: 4.1 10E3/UL (ref 1.6–8.3)
NEUTROPHILS NFR BLD AUTO: 71 %
NRBC # BLD AUTO: 0 10E3/UL
NRBC BLD AUTO-RTO: 0 /100
PLATELET # BLD AUTO: 296 10E3/UL (ref 150–450)
POTASSIUM SERPL-SCNC: 3.9 MMOL/L (ref 3.4–5.3)
PROT SERPL-MCNC: 7.1 G/DL (ref 6.4–8.3)
RBC # BLD AUTO: 4.03 10E6/UL (ref 4.4–5.9)
SODIUM SERPL-SCNC: 133 MMOL/L (ref 135–145)
TROPONIN T SERPL HS-MCNC: 44 NG/L
TROPONIN T SERPL HS-MCNC: 44 NG/L
WBC # BLD AUTO: 5.7 10E3/UL (ref 4–11)

## 2023-12-20 PROCEDURE — 85025 COMPLETE CBC W/AUTO DIFF WBC: CPT | Performed by: EMERGENCY MEDICINE

## 2023-12-20 PROCEDURE — 93005 ELECTROCARDIOGRAM TRACING: CPT | Performed by: EMERGENCY MEDICINE

## 2023-12-20 PROCEDURE — 99285 EMERGENCY DEPT VISIT HI MDM: CPT | Mod: 25 | Performed by: EMERGENCY MEDICINE

## 2023-12-20 PROCEDURE — 36415 COLL VENOUS BLD VENIPUNCTURE: CPT | Performed by: EMERGENCY MEDICINE

## 2023-12-20 PROCEDURE — 82248 BILIRUBIN DIRECT: CPT | Performed by: EMERGENCY MEDICINE

## 2023-12-20 PROCEDURE — 82310 ASSAY OF CALCIUM: CPT | Performed by: EMERGENCY MEDICINE

## 2023-12-20 PROCEDURE — 71046 X-RAY EXAM CHEST 2 VIEWS: CPT

## 2023-12-20 PROCEDURE — 93010 ELECTROCARDIOGRAM REPORT: CPT | Performed by: EMERGENCY MEDICINE

## 2023-12-20 PROCEDURE — 83690 ASSAY OF LIPASE: CPT | Performed by: EMERGENCY MEDICINE

## 2023-12-20 PROCEDURE — 84484 ASSAY OF TROPONIN QUANT: CPT | Performed by: EMERGENCY MEDICINE

## 2023-12-20 ASSESSMENT — ACTIVITIES OF DAILY LIVING (ADL)
ADLS_ACUITY_SCORE: 35
ADLS_ACUITY_SCORE: 35

## 2023-12-20 NOTE — TELEPHONE ENCOUNTER
Called Duane back.   Had been on Lipitor 20 mg prior to hospitalization for a couple years. When he got home from the hospital on 11/16 he didn't realize he should increase to 80 mg daily, which is new dose.  Just realized now that he should be on 80 mg daily when he went to get a refill. Will clarify if needs any follow up with this increase.     He also wanted to let us know he went to charge his cardiac monitor he's wearing and it won't charge, and has stopped transmitting. Is a 30 day monitor put on by [facility] after a hospitalization there from 11/6 - 11/13. He called [facility], the , and they aren't much help. He has 2 days left to wear it. I told him it has a good amount of data on it and to just mail it in per the instructions.

## 2023-12-20 NOTE — TELEPHONE ENCOUNTER
Reviewed with Sirisha Arias. Ok to take lipitor 80 mg daily. Will plan to check lipids in February. Order placed.

## 2023-12-20 NOTE — TELEPHONE ENCOUNTER
Health Call Center    Phone Message    May a detailed message be left on voicemail: yes     Reason for Call: Medication Question or concern regarding medication   Prescription Clarification  Name of Medication: atorvastatin (LIPITOR) 80 MG tablet   Prescribing Provider: Dr Jose Coles PMD   Pharmacy:    What on the order needs clarification? Since November 11/16 th the patient has only been taking 20 MG of this medication and he now knows he should be taking 80 MG But concerned if he needs to do anything else? Please call to discuss.      Action Taken: Other: Cardiology    Travel Screening: Not Applicable    Thank you!  [facility]

## 2023-12-20 NOTE — ED TRIAGE NOTES
Onset of CP x 1 hour ago. Pt was diaphoretic.  Pain has lessened in severity and no longer diaphoretic. H/o MI and PTCA w stent x 1 in October 2023     Triage Assessment (Adult)       Row Name 12/20/23 1713          Triage Assessment    Airway WDL WDL        Cognitive/Neuro/Behavioral WDL    Cognitive/Neuro/Behavioral WDL WDL

## 2023-12-20 NOTE — TELEPHONE ENCOUNTER
"Received a call from the call center to discuss chest pain and shortness of breath.  Spoke to Duane, who says he had a heart attack about 2 months ago and has had others in the past. He says he was sitting \"doing nothing\" and had chest pain to the left of the sternum that goes into his back. He says he is also sweating and having shortness of breath. He says this has been going on for 15-30 minutes. He says he does not know if it gets worse with exertion, because he has not tried to do any exertional activity. He says he tries to take deep breaths, but feels like he can't get in any more of a deep breath. He denies dizziness. He says he has had a few heart attacks in the past. He expresses worry and concern about his symptoms.   Advised it is recommended to go to the ED for an evaluation. He verbalized agreement and understanding.  Will send a message to [facility] for an update.  1. LOCATION: \"Where does it hurt?\" Sternal area  2. RADIATION: \"Does the pain go anywhere else?\" (e.g., into neck, jaw, arms, back) back  3. ONSET: \"When did the chest pain begin?\" (Minutes, hours or days) 15-30 minutes  4. PATTERN: \"Does the pain come and go, or has it been constant since it started?\" \"Does it get worse with exertion?\" Having it now  5. DURATION: \"How long does it last\" (e.g., seconds, minutes, hours)  6. SEVERITY: \"How bad is the pain?\" Moderate(e.g., Scale 1-10; mild, moderate, or severe)  - MILD (1-3): doesn't interfere with normal activities  - MODERATE (4-7): interferes with normal activities or awakens from sleep  - SEVERE (8-10): excruciating pain, unable to do any normal activities  7. CARDIAC RISK FACTORS: \"Do you have any history of heart problems or risk factors for heart disease?\" (e.g., angina, prior heart attack; diabetes, high blood pressure, high cholesterol, smoker, or strong family history of heart disease)Hx of STEMI  8. PULMONARY RISK FACTORS: \"Do you have any history of lung disease?\" (e.g., " "blood clots in lung, asthma, emphysema, birth control pills)  9. CAUSE: \"What do you think is causing the chest pain?\"  10. OTHER SYMPTOMS: \"Do you have any other symptoms?\" (e.g., dizziness, nausea, vomiting, sweating, fever, difficulty breathing, cough) sweating, short of breath  11. PREGNANCY: \"Is there any chance you are pregnant?\" \"When was your last menstrual period?\"  Reason for Disposition   [1] Chest pain (or \"angina\") comes and goes AND [2] is happening more often (increasing in frequency) or getting worse (increasing in severity)  (Exception: Chest pains that last only a few seconds.)   Difficulty breathing   [1] Chest pain lasts > 5 minutes AND [2] occurred in past 3 days (72 hours) (Exception: Feels exactly the same as previously diagnosed heartburn and has accompanying sour taste in mouth.)    Protocols used: Chest Pain-A-AH    "

## 2023-12-21 ENCOUNTER — PATIENT OUTREACH (OUTPATIENT)
Dept: CARE COORDINATION | Facility: CLINIC | Age: 73
End: 2023-12-21
Payer: MEDICARE

## 2023-12-21 ENCOUNTER — CARE COORDINATION (OUTPATIENT)
Dept: CARDIOLOGY | Facility: CLINIC | Age: 73
End: 2023-12-21
Payer: MEDICARE

## 2023-12-21 ASSESSMENT — ACTIVITIES OF DAILY LIVING (ADL): DEPENDENT_IADLS:: INDEPENDENT

## 2023-12-21 NOTE — PROGRESS NOTES
Clinic Care Coordination Contact  Clinic Care Coordination Contact  OUTREACH    Referral Information:  Referral Source: ED Follow-Up    Primary Diagnosis: CHF    Chief Complaint   Patient presents with    Clinic Care Coordination - Post Hospital     Clinic Care Coordination RN         Universal Utilization: ED visit 12/20/2023 Chest pain   Clinic Utilization  Difficulty keeping appointments:: No  Compliance Concerns: No  No-Show Concerns: No  No PCP office visit in Past Year: No  Utilization      No Show Count (past year)  1             ED Visits  6             Hospital Admissions  4                    Current as of: 12/21/2023  8:17 AM                Clinical Concerns:  Current Medical Concerns:  Patient reports chest pain has resolved  patient was informed by the ED provider the pain was Possible irritation between rib and spine .  Patient has several future CR appointments scheduled   Current Behavioral Concerns: No    Education Provided to patient: Continue to monitor chest pain reoccurrence    Pain  Pain (GOAL):: No  Health Maintenance Reviewed: Not assessed  Clinical Pathway: None    Medication Management:  Medication review status: Medications reviewed and no changes reported per patient.             Functional Status:  Dependent ADLs:: Independent  Dependent IADLs:: Independent  Bed or wheelchair confined:: No  Mobility Status: Independent    Living Situation:  Current living arrangement:: I live in a private home with spouse    Lifestyle & Psychosocial Needs:    Social Determinants of Health     Food Insecurity: Low Risk  (11/23/2023)    Food Insecurity     Within the past 12 months, did you worry that your food would run out before you got money to buy more?: No     Within the past 12 months, did the food you bought just not last and you didn t have money to get more?: No   Depression: Not at risk (11/24/2023)    PHQ-2     PHQ-2 Score: 0   Housing Stability: Low Risk  (11/23/2023)    Housing Stability      Do you have housing? : Yes     Are you worried about losing your housing?: No   Tobacco Use: Medium Risk (12/20/2023)    Patient History     Smoking Tobacco Use: Former     Smokeless Tobacco Use: Never     Passive Exposure: Current   Financial Resource Strain: Low Risk  (11/23/2023)    Financial Resource Strain     Within the past 12 months, have you or your family members you live with been unable to get utilities (heat, electricity) when it was really needed?: No   Alcohol Use: Not on file   Transportation Needs: Low Risk  (11/23/2023)    Transportation Needs     Within the past 12 months, has lack of transportation kept you from medical appointments, getting your medicines, non-medical meetings or appointments, work, or from getting things that you need?: No   Physical Activity: Inactive (11/16/2023)    Exercise Vital Sign     Days of Exercise per Week: 0 days     Minutes of Exercise per Session: 0 min   Interpersonal Safety: Low Risk  (10/9/2023)    Interpersonal Safety     Do you feel physically and emotionally safe where you currently live?: Yes     Within the past 12 months, have you been hit, slapped, kicked or otherwise physically hurt by someone?: No     Within the past 12 months, have you been humiliated or emotionally abused in other ways by your partner or ex-partner?: No   Stress: Not on file   Social Connections: Unknown (11/16/2023)    Social Connection and Isolation Panel [NHANES]     Frequency of Communication with Friends and Family: Not on file     Frequency of Social Gatherings with Friends and Family: Not on file     Attends Rastafari Services: Not on file     Active Member of Clubs or Organizations: Not on file     Attends Club or Organization Meetings: Not on file     Marital Status:      Diet:: No added salt  Inadequate nutrition (GOAL):: No  Tube Feeding: No  Inadequate activity/exercise (GOAL):: No  Significant changes in sleep pattern (GOAL): No  Transportation means:: Family      Nondenominational or spiritual beliefs that impact treatment:: No  Mental health DX:: Yes  Mental health DX how managed:: Medication  Mental health management concern (GOAL):: No  Chemical Dependency Status: No Current Concerns  Informal Support system:: Spouse             Resources and Interventions:  Current Resources:   Skilled Home Care Services: Skilled Nursing, Home Health Aid, Physical Therapy, Occupational Therapy  Community Resources: Home Care  Supplies Currently Used at Home: None                    Referrals Placed: None         Care Plan:  Care Plan: Heart Failure       Problem: Heart Failure Management Needs Improvement       Goal: Demonstrate improved heart failure management       Start Date: 11/16/2023 Expected End Date: 1/16/2024    This Visit's Progress: 50% Recent Progress: 40%    Priority: High    Note:     Barriers: Deconditioned   Strengths: supportive wife   Patient expressed understanding of goal: Yes   Action steps to achieve this goal:  1. I will check my weight daily and call if weight gain is 2-3# in a day or 5# in a week,increased shortness of breath episodes or leg swelling   2. I will take my medications as directed and keep future provider appointments   3. I will keep future Cardiac Rehab appointments   4. I will continue home care services                                Patient/Caregiver understanding: Patient expresses good understanding of discharge instructions     Outreach Frequency: 2 weeks, more frequently as needed  Future Appointments                Tomorrow [place] CARD RHB 1 M [facility] Cardiac and Pulmonary Rehabilitation [facility]    In 6 days [place] C[place] RHB 1 M [facility] and Pulmonary Rehabilitation [facility]    In 1 week [place] CARD RHB 1 M [facility] Cardiac and Pulmonary Rehabilitation [facility]    In 1 week [place] C[place] RHB 1 M [facility] Cardiac and Pulmonary Rehabilitation [facility]    In 2 weeks [place] CARD RHB  1 M [facility] Cardiac and Pulmonary Rehabilitation [place], [place] [facility]    In 2 weeks [place] CARD RHB 1 M [facility] Rehabilitation [place] [place] [facility]    In 2 weeks UC LAB [facility]    In 2 weeks [place] CARD RHB 1 M [facility] [place] [place] [facility]    In 2 weeks Sirisha Arias, NADEGE CNP [facility] Heart [facility]    In 3 weeks [place] CARD RHB 1 [facility] [place] [place] [facility]    In 3 weeks [place] C[place] RHB 1 M [facility] [place] [place] [facility]    In 3 weeks [place] CARD RHB 1 M [facility]monary Rehabilitation [place] [place] [facility]    In 4 weeks [place] C[place] RHB 1 M [facility] [place] [place] [facility]    In 1 month [place] CARD RHB 1 M [facility] [place] [place] [facility]    In 1 month [place] CARD RHB 1 M [facility] [place] [place] [facility]    In 1 month Blas Causey MD [facility] [place]    In 1 month [place] CARD RHB 1 M [facility] [place] [place]    In 1 month [place] CARD RHB 1 M [facility] [place] [facility]    In 1 month [place] CARD RHB 1 M [facility] [place] [place] [facility]    In 1 month [place] RHB 1 M [facility] [place] [place] [facility]    In 1 month [place] CARD RHB 1 M [facility] [place] [place] [facility]    In 1 month [place] C[place] RHB 1 M [facility] [place] [place] [facility]    In 1 month [place] CARD RHB 1 M [facility] Rehabilitation [place]    In 1 month [place] C[place] RHB 1 M [facility]  Cardiac and Pulmonary Rehabilitation [facility]    In 1 month [place] [facility] RHB 1 [facility] Cardiac and Pulmonary Rehabilitation [facility]    In 2 months  CRITICAL CARE [facility] Heart Clinic [facility]    In 2 months Steve Dixon DO [facility] Specialty Clinic [facility]    In 3 months Sedrick Lehman, PhD [facility] Neuropsychology [facility]            Plan: CC RN will follow up in 1-2 weeks     [facility]   Ruthy Paiz RN, Care Coordinator   [facility]'s   E-mail [email or web address]   [phone]

## 2023-12-21 NOTE — ED PROVIDER NOTES
"  History     Chief Complaint   Patient presents with    Chest Pain     HPI  History per patient, review of [facility] EMR and Care Everywhere EMR, including extensive chart review of multiple prior imaging studies, multiple clinic notes and multiple prior laboratory evaluations.   Duane C Johnson is a 73 year old male with history of CAD and anterior ST elevation MI with PCI and LAD stenting with VF/VT cardiac arrest x 2 in late October of this year followed by admission in early November of this year for CHF and respiratory failure secondary to aspiration pneumonia who presents emergency department for evaluation of chest pain which began at rest at approximately 3 PM this afternoon, approximately 2-1/2 hours ago.  He was at rest, sitting on the couch, when he started feeling short of breath, intermittently occurs on a chronic basis, and was \"trying to control my breathing\" and then developed left chest pain just left of midline which radiates towards the back and was initially associated with feeling mildly sweaty.  Pain is mild, \"it's not really a pain\", a vague discomfort. He has been having intermittent chest pains since CPR and rib fractures after cardiac arrest and chest compressions following STEMI in late October/this fall.  No recent SOA, URI symptoms, cough, hemoptysis or leg pain or leg swelling.  No abdominal or flank pain.  He is chronically anticoagulated on Eliquis and Plavix, didn't tolerate Brilinta and he has maintained compliance with these.  Echocardiogram 10/30/2023 showed EF 20-30% and apical wall akinesis.    Previous Records Reviewed:  10/26/2023 repeat coronary angiogram after post ST elevation MI followed by cardiac arrest:    Indications    Cardiac arrest (H) [I46.9 (ICD-10-CM)]     Comments/Patient Narrative    Patient with a recent AMI ofhte LAD and PCI two nights ago has had two VT/VF arrests over the last 2 hours with successful ROSC with discontinue shocks. ECG had ST elevation and q " waves likely from evolution of the infarct but an angiogram was needed to exclude ASThrombosis.     Conclusion    Unchanged angiogram with patient stent and good distal flow in all arteries  Most likely evolving injury related VT   Amiodarone loaded 75 mg and continue on the floor loading and consult EP for possible vest and or EP study         Coronary Findings    Diagnostic  Dominance: Right  Left Main   The vessel was visualized by selective angiography and is moderate in size. There was 0% vessel disease. Pressure wire/FFR was not performed on the vessel. IVUS was not performed on the vessel.      Left Anterior Descending   The vessel was visualized by selective angiography. There was 20% calcified vessel disease.      Ramus Intermedius   The vessel was visualized by selective angiography and is moderate in size. There was 0% vessel disease.      Left Circumflex   The vessel was visualized by selective angiography and is moderate in size. There was 15% vessel disease.      Right Coronary Artery   The vessel was visualized by selective angiography. There was 20% vessel disease.      Intervention     No interventions have been documented.       [facility]  Hospitalist Discharge Summary       Date of Admission:  11/12/2023  Date of Discharge:  11/15/2023 11:48 AM     Discharge Diagnoses  Acute hypoxic respiratory failure, resolved  Heart failure with reduced ejection fraction (EF 20-30%)  Recent hx of CAP  DOMENICA, KDIGO stage 1  CAD s/p PCI to LAD (10/26/23)  Recent VT/VF cardiac arrest (10/27/23)  NSTEMI type II  COPD  Tobacco use disorder   Anxiety  Medication side effect  Insomnia   Moderate cognitive impairment  Chronic anemia     Hospital Course  Duane Johnson is a 72 yo male with a history of HTN, HLD and multiple recent admissions: 10/26-11/3 with VF/VT cardiac arrest X2, anterior STEMI s/p PCI to LAD; readmission 11/6-11/12/23 with systolic CHF and respiratory failure 2/2 aspiration  pneumonia. Now presents later on 11/12 for ongoing dyspnea.      Acute hypoxic respiratory failure, resolved  Multifactorial. Ongoing issue since MI Oct 26, 2023. EF is newly reduced following MI which is probably contributing. Long time smoker so probably a component of COPD although not previously diagnosed. TABATHA suspected based on nocturnal hypoxemia noted during previous admission. Also recently treated for aspiration pneumonia (finished course of Augmentin).  Notably, CXR does look worse on presentation 11/12 compared to previous: increasing bilateral groundglass density infiltrates more apparent on right, likely representing infectious or inflammatory process. Increasing venous hypertension. Small bilateral pleural effusions, more apparent on left.   Patient likely experiencing dyspnea from Brilinta (known side effect that can affect ~20% of patients). Patient was switched off Brilinta to Plavix and patient's symptoms resolved. We continued to observe the patient overnight given recent admissions and patient had no additional hypoxia or sensations of hypoxia. Exercise and nocturnal oximetry were negative for requiring supplemental oxygen.   - Continue Plavix 75 mg daily  - Increased Furosemide dose from 20 mg to 40 mg, discussed weight gain of greater than 5 lbs and would recommend patient call his cardiology or PCP office for additional instructions for diuretic adjustments.      Heart failure with reduced ejection fraction  Apical wall akinesis  BNP elevated from previous (11,149 on 11/6, now 16,038 on 11/12). CXR shows increasing venous hypertension and bilateral pleural effusions. Mild JVD but no edema. Near baseline weight (148lbs).  Echo from 10/30/23 showed EF 20-30% and apical wall akinesis; no LV thrombus. Per cardiology at [facility], he willl need anticoagulation for 3 months. Managed PTA with furosemide 20mg daily, metoprolol, losartan. Patient with -2.6 L UOP yesterday.   -Continue on increased dose  of PO furosemide 40 mg daily (previously 20 mg)  -Continue PTA metoprolol, losartan with hold parameters  -Continue Apixaban 5 mg for 3 months and will need follow-up with cardiology for apical wall hypokinesis.   -Planning on [facility], RN, PT, OT with transition to cardiac rehab at a later date.   Allergies:  Allergies   Allergen Reactions    Brilinta [Ticagrelor]      Per pt and spouse, hyperventilation, difficulty breathing    Clonazepam Other (See Comments)     Per spouse, acted like a zombie and he was shaky, could barely talk.       Problem List:    Patient Active Problem List    Diagnosis Date Noted    Anxiety 11/13/2023     Priority: Medium    Congestive heart failure, unspecified HF chronicity, unspecified heart failure type (H) 11/13/2023     Priority: Medium    ST elevation myocardial infarction involving left anterior descending (LAD) coronary artery (H) 11/13/2023     Priority: Medium    Acute respiratory failure with hypoxia (H) 11/07/2023     Priority: Medium    Multifocal pneumonia 11/07/2023     Priority: Medium    Elevated brain natriuretic peptide (BNP) level 11/07/2023     Priority: Medium    Primary hypertension 11/07/2023     Priority: Medium    SOB (shortness of breath) 11/07/2023     Priority: Medium    Coronary artery disease due to calcified coronary lesion 11/07/2023     Priority: Medium    Pneumonia of left upper lobe due to infectious organism 11/07/2023     Priority: Medium    Systolic CHF (H) 11/07/2023     Priority: Medium    Hx of cardiac arrest 10/27/2023     Priority: Medium    ST elevation MI (STEMI) (H) 10/26/2023     Priority: Medium    Prostate cancer (H) 09/23/2023     Priority: Medium    Hyperlipidemia LDL goal <130 01/26/2016     Priority: Medium    Adiposity 05/06/2010     Priority: Medium    Tobacco dependence syndrome 01/23/2008     Priority: Medium        Past Medical History:    Past Medical History:   Diagnosis Date    Benign essential hypertension     CAD (coronary artery  disease)     Chronic systolic heart failure (H)     Hypertension     ST elevation myocardial infarction (STEMI), unspecified artery (H)     Ventricular fibrillation (H)        Past Surgical History:    Past Surgical History:   Procedure Laterality Date    COLONOSCOPY N/A 3/23/2023    Procedure: COLONOSCOPY, FLEXIBLE, WITH LESION REMOVAL USING SNARE;  Surgeon: Jose Faustin MD;  Location: [facility]    CV CENTRAL VENOUS CATHETER PLACEMENT N/A 10/26/2023    Procedure: Central Venous Catheter Placement;  Surgeon: Rob Lyles MD;  Location:  HEART CARDIAC CATH LAB    CV CORONARY ANGIOGRAM N/A 10/27/2023    Procedure: Coronary Angiogram;  Surgeon: Rob Lyles MD;  Location:  HEART CARDIAC CATH LAB    CV CORONARY ANGIOGRAM N/A 10/26/2023    Procedure: Coronary Angiogram;  Surgeon: Rob Lyles MD;  Location:  HEART CARDIAC CATH LAB    CV LEFT HEART CATH N/A 10/26/2023    Procedure: Left Heart Catheterization;  Surgeon: Rob Lyles MD;  Location: [facility] CARDIAC CATH LAB    CV PCI N/A 10/27/2023    Procedure: Percutaneous Coronary Intervention;  Surgeon: Rob Lyles MD;  Location:  HEART CARDIAC CATH LAB    CV PCI STENT DRUG ELUTING N/A 10/26/2023    Procedure: Percutaneous Coronary Intervention Stent;  Surgeon: Rob Lyles MD;  Location:  HEART CARDIAC CATH LAB       Family History:    Family History   Problem Relation Age of Onset    Other Cancer Mother     Obesity Mother     Cervical Cancer Sister     GI problems Father     Other Cancer Sister        Social History:  Marital Status:   [2]  Social History     Tobacco Use    Smoking status: Former     Packs/day: .25     Types: Cigarettes     Passive exposure: Current    Smokeless tobacco: Never    Tobacco comments:     Quit 10/26/2023   Vaping Use    Vaping Use: Never used   Substance Use Topics    Alcohol use: Yes     Comment: occas    Drug use: No        Medications:    acetaminophen  "(TYLENOL) 325 MG tablet  amiodarone (PACERONE) 200 MG tablet  apixaban ANTICOAGULANT (ELIQUIS) 5 MG tablet  atorvastatin (LIPITOR) 80 MG tablet  cetirizine (ZYRTEC) 10 MG tablet  clopidogrel (PLAVIX) 75 MG tablet  escitalopram (LEXAPRO) 10 MG tablet  fish oil-omega-3 fatty acids 1000 MG capsule  furosemide (LASIX) 20 MG tablet  losartan (COZAAR) 25 MG tablet  melatonin 10 MG TABS tablet  metoprolol succinate ER (TOPROL XL) 25 MG 24 hr tablet  multivitamin w/minerals (THERA-VIT-M) tablet  vitamin C (ASCORBIC ACID) 1000 MG TABS        Review of Systems  As mentioned in the HPI, in addition focused review of systems was negative.    Physical Exam   BP: 104/65  Pulse: 62  Temp: 97.6  F (36.4  C)  Resp: 18  Height: 167.6 cm (5' 6\")  Weight: 62.1 kg (137 lb)  SpO2: 94 %      Physical Exam  Vitals and nursing note reviewed.   Constitutional:       General: He is not in acute distress.     Appearance: Normal appearance. He is well-developed. He is not ill-appearing or diaphoretic.   Eyes:      General: No scleral icterus.     Extraocular Movements: Extraocular movements intact.      Conjunctiva/sclera: Conjunctivae normal.   Neck:      Trachea: No tracheal deviation.   Cardiovascular:      Rate and Rhythm: Normal rate and regular rhythm.      Pulses: Normal pulses.      Heart sounds: Normal heart sounds. No murmur heard.     No friction rub. No gallop.   Pulmonary:      Effort: Pulmonary effort is normal. No tachypnea or respiratory distress.      Breath sounds: Normal breath sounds. No stridor. No decreased breath sounds, wheezing, rhonchi or rales.   Chest:      Chest wall: Tenderness (Somewhat reproducible chest wall tenderness in the area of his chest pain) present. No deformity, swelling, crepitus or edema.       Abdominal:      General: There is no distension.      Palpations: Abdomen is soft.      Tenderness: There is no abdominal tenderness.   Musculoskeletal:         General: No swelling or tenderness. Normal range " of motion.      Cervical back: Normal range of motion and neck supple.      Right lower leg: No edema.      Left lower leg: No edema.   Skin:     General: Skin is warm and dry.      Coloration: Skin is not pale.      Findings: No erythema or rash.   Neurological:      General: No focal deficit present.      Mental Status: He is alert and oriented to person, place, and time.   Psychiatric:         Mood and Affect: Mood normal.         Behavior: Behavior normal.         ED Course                 Procedures              EKG Interpretation:      Interpreted by Frederick Chapin MD  Time reviewed: Upon completion  Symptoms at time of EKG: Chest pain  Rhythm: normal sinus   Rate: normal  Axis: normal  Ectopy: PVCs 6 2  Conduction: normal  ST Segments/ T Waves: Non-specific ST-T wave changes  Q Waves: Old anterior infarct  Comparison to prior: Unchanged from 12/18/2023  Clinical Impression: normal EKG           Results for orders placed or performed during the hospital encounter of 12/20/23 (from the past 24 hour(s))   CBC with Platelets & Differential    Narrative    The following orders were created for panel order CBC with Platelets & Differential.  Procedure                               Abnormality         Status                     ---------                               -----------         ------                     CBC with platelets and d...[[id number]]  Abnormal            Final result                 Please view results for these tests on the individual orders.   Basic metabolic panel   Result Value Ref Range    Sodium 133 (L) 135 - 145 mmol/L    Potassium 3.9 3.4 - 5.3 mmol/L    Chloride 97 (L) 98 - 107 mmol/L    Carbon Dioxide (CO2) 22 22 - 29 mmol/L    Anion Gap 14 7 - 15 mmol/L    Urea Nitrogen 9.0 8.0 - 23.0 mg/dL    Creatinine 0.89 0.67 - 1.17 mg/dL    GFR Estimate 90 >60 mL/min/1.73m2    Calcium 9.2 8.8 - 10.2 mg/dL    Glucose 94 70 - 99 mg/dL   Troponin T, High Sensitivity   Result Value Ref Range     Troponin T, High Sensitivity 44 (H) <=22 ng/L   [place] Draw    Narrative    The following orders were created for panel order [place] Draw.  Procedure                               Abnormality         Status                     ---------                               -----------         ------                     Extra Blue Top Tube[[id number]]                              Final result               Extra Red Top Tube[[id number]]                               Final result                 Please view results for these tests on the individual orders.   CBC with platelets and differential   Result Value Ref Range    WBC Count 5.7 4.0 - 11.0 10e3/uL    RBC Count 4.03 (L) 4.40 - 5.90 10e6/uL    Hemoglobin 12.9 (L) 13.3 - 17.7 g/dL    Hematocrit 37.6 (L) 40.0 - 53.0 %    MCV 93 78 - 100 fL    MCH 32.0 26.5 - 33.0 pg    MCHC 34.3 31.5 - 36.5 g/dL    RDW 13.4 10.0 - 15.0 %    Platelet Count 296 150 - 450 10e3/uL    % Neutrophils 71 %    % Lymphocytes 17 %    % Monocytes 10 %    % Eosinophils 1 %    % Basophils 1 %    % Immature Granulocytes 0 %    NRBCs per 100 WBC 0 <1 /100    Absolute Neutrophils 4.1 1.6 - 8.3 10e3/uL    Absolute Lymphocytes 1.0 0.8 - 5.3 10e3/uL    Absolute Monocytes 0.6 0.0 - 1.3 10e3/uL    Absolute Eosinophils 0.1 0.0 - 0.7 10e3/uL    Absolute Basophils 0.1 0.0 - 0.2 10e3/uL    Absolute Immature Granulocytes 0.0 <=0.4 10e3/uL    Absolute NRBCs 0.0 10e3/uL   Extra Blue Top Tube   Result Value Ref Range    Hold Specimen [facility]    Extra Red Top Tube   Result Value Ref Range    Hold Specimen [facility]    Hepatic panel   Result Value Ref Range    Protein Total 7.1 6.4 - 8.3 g/dL    Albumin 3.6 3.5 - 5.2 g/dL    Bilirubin Total 0.6 <=1.2 mg/dL    Alkaline Phosphatase 115 40 - 150 U/L    AST 23 0 - 45 U/L    ALT 20 0 - 70 U/L    Bilirubin Direct <0.20 0.00 - 0.30 mg/dL   Lipase   Result Value Ref Range    Lipase 21 13 - 60 U/L   Troponin T, High Sensitivity   Result Value Ref Range    Troponin T, High Sensitivity 44  "(H) <=22 ng/L     Previous Records Reviewed:  Last troponin 2 days ago, 12/20/2023: 44.    Medications - No data to display        Assessments & Plan (with Medical Decision Making)   73 year old male with history of CAD and anterior ST elevation MI with PCI and LAD stenting with VF/VT cardiac arrest x 2 in late October of this year with chest pain which began at rest at approximately 3 PM this afternoon, approximately 2-1/2 hours ago.  He was at rest, sitting on the couch, when he started feeling short of breath, intermittently occurs on a chronic basis, and was \"trying to control my breathing\" and then developed left chest pain just left of midline which radiates towards the back and was initially associated with feeling mildly sweaty.  Pain is mild, \"it's not really a pain\", a vague discomfort. He has been having intermittent chest pains since CPR and rib fractures after cardiac arrest and chest compressions following STEMI in late October/this fall.  He is chronically anticoagulated on Eliquis and Plavix.  Echocardiogram 10/30/2023 showed EF 20-30% and apical wall akinesis. Post cardiac arrest CPR caused rib fractures and I suspect his CP is non-cardiac and musculoskeletal in etiology. A post cardiac arrest repeat coronary angiogram 10/26/23 was reassuring. Doubt ACS/atypical ACS, stent occlusion, aneurysm/dissection, pericarditis, PE/DVT, PTX, pneumonia or emergent GI or hepatobiliary disease process as a cause of the pain.   No acute EKG changes, initial troponin is essentially unchanged from most recent troponin at 44, versus 43 when he was seen in the ED 2 days ago complaining of dyspnea and had an unremarkable evaluation. Repeat troponin unchanged, 44. I will make a Cardiology referral to help get him in before his scheduled appointment 1/10/24. He understands need to continue strict compliance with his anticoagulation therapy. He and his wife were provided instructions for supportive care and will return as " needed for worsened condition or worsening symptoms, or new problems or concerns.      I have reviewed the nursing notes.    I have reviewed the findings, diagnosis, plan and need for follow up with the patient and his significant other.    Medical Decision Making: High Complexity  Hospitalization for observation, cardiac monitoring and serial troponins was considered and deferred. Currently appears stable and appropriate for outpatient management with close f/u.       New Prescriptions    No medications on file       Final diagnoses:   Acute chest pain   ST elevation myocardial infarction involving left anterior descending (LAD) coronary artery (H) - 10/26/2023   Hx of cardiac arrest - After ST elevation MI   Congestive heart failure, unspecified HF chronicity, unspecified heart failure type (H)       12/20/2023   [facility] EMERGENCY DEPT       Frederick Chapin MD  12/21/23 [id number]

## 2023-12-21 NOTE — PROGRESS NOTES
"Duane went back in for some pain in his chest- he was worked up again- troponin was stable from previous values.  He was having some shortness of breath- but could settle it down on his own but still having a little pain.  Had a cold sweat, but nothing since.      Has not been on lasix for \"quite awhile,\"  weight remains stable, swelling stable.       He is sending his heart monitor back.      Reviewed with Sirisha SANDHU, who recommended following up with PCP for anxiety management.  Duane and Melissa stated understanding and will try that.    "

## 2023-12-22 ENCOUNTER — HOSPITAL ENCOUNTER (OUTPATIENT)
Dept: CARDIAC REHAB | Facility: CLINIC | Age: 73
Discharge: HOME | End: 2023-12-22
Attending: INTERNAL MEDICINE
Payer: MEDICARE

## 2023-12-22 PROCEDURE — 93798 PHYS/QHP OP CAR RHAB W/ECG: CPT | Performed by: REHABILITATION PRACTITIONER

## 2023-12-25 ENCOUNTER — APPOINTMENT (OUTPATIENT)
Dept: CT IMAGING | Facility: CLINIC | Age: 73
DRG: 291 | End: 2023-12-25
Attending: EMERGENCY MEDICINE
Payer: MEDICARE

## 2023-12-25 ENCOUNTER — HOSPITAL ENCOUNTER (INPATIENT)
Facility: CLINIC | Age: 73
LOS: 3 days | Discharge: HOME HEALTH | DRG: 291 | End: 2023-12-28
Attending: EMERGENCY MEDICINE | Admitting: STUDENT IN AN ORGANIZED HEALTH CARE EDUCATION/TRAINING PROGRAM
Payer: MEDICARE

## 2023-12-25 ENCOUNTER — TELEPHONE (OUTPATIENT)
Dept: CARDIOLOGY | Facility: CLINIC | Age: 73
End: 2023-12-25
Payer: MEDICARE

## 2023-12-25 DIAGNOSIS — E83.42 HYPOMAGNESEMIA: ICD-10-CM

## 2023-12-25 DIAGNOSIS — F41.9 ANXIETY: Primary | ICD-10-CM

## 2023-12-25 DIAGNOSIS — I50.9 ACUTE DECOMPENSATED HEART FAILURE (H): ICD-10-CM

## 2023-12-25 DIAGNOSIS — I50.9 CONGESTIVE HEART FAILURE, UNSPECIFIED HF CHRONICITY, UNSPECIFIED HEART FAILURE TYPE (H): ICD-10-CM

## 2023-12-25 DIAGNOSIS — I50.9 ACUTE ON CHRONIC CONGESTIVE HEART FAILURE, UNSPECIFIED HEART FAILURE TYPE (H): ICD-10-CM

## 2023-12-25 LAB
ALBUMIN SERPL BCG-MCNC: 3.4 G/DL (ref 3.5–5.2)
ALP SERPL-CCNC: 107 U/L (ref 40–150)
ALT SERPL W P-5'-P-CCNC: 23 U/L (ref 0–70)
ANION GAP SERPL CALCULATED.3IONS-SCNC: 9 MMOL/L (ref 7–15)
AST SERPL W P-5'-P-CCNC: 25 U/L (ref 0–45)
BASOPHILS # BLD AUTO: 0.1 10E3/UL (ref 0–0.2)
BASOPHILS NFR BLD AUTO: 1 %
BILIRUB SERPL-MCNC: 0.7 MG/DL
BUN SERPL-MCNC: 6.6 MG/DL (ref 8–23)
CALCIUM SERPL-MCNC: 8.7 MG/DL (ref 8.8–10.2)
CHLORIDE SERPL-SCNC: 99 MMOL/L (ref 98–107)
CREAT SERPL-MCNC: 0.85 MG/DL (ref 0.67–1.17)
D DIMER PPP FEU-MCNC: 0.87 UG/ML FEU (ref 0–0.5)
DEPRECATED HCO3 PLAS-SCNC: 25 MMOL/L (ref 22–29)
EGFRCR SERPLBLD CKD-EPI 2021: >90 ML/MIN/1.73M2
EOSINOPHIL # BLD AUTO: 0.2 10E3/UL (ref 0–0.7)
EOSINOPHIL NFR BLD AUTO: 3 %
ERYTHROCYTE [DISTWIDTH] IN BLOOD BY AUTOMATED COUNT: 13.5 % (ref 10–15)
FERRITIN SERPL-MCNC: 61 NG/ML (ref 31–409)
GLUCOSE SERPL-MCNC: 92 MG/DL (ref 70–99)
HCT VFR BLD AUTO: 34.4 % (ref 40–53)
HGB BLD-MCNC: 11.6 G/DL (ref 13.3–17.7)
IMM GRANULOCYTES # BLD: 0 10E3/UL
IMM GRANULOCYTES NFR BLD: 0 %
IRON BINDING CAPACITY (ROCHE): 193 UG/DL (ref 240–430)
IRON SATN MFR SERPL: 16 % (ref 15–46)
IRON SERPL-MCNC: 30 UG/DL (ref 61–157)
LYMPHOCYTES # BLD AUTO: 0.9 10E3/UL (ref 0.8–5.3)
LYMPHOCYTES NFR BLD AUTO: 16 %
MCH RBC QN AUTO: 31.7 PG (ref 26.5–33)
MCHC RBC AUTO-ENTMCNC: 33.7 G/DL (ref 31.5–36.5)
MCV RBC AUTO: 94 FL (ref 78–100)
MONOCYTES # BLD AUTO: 0.5 10E3/UL (ref 0–1.3)
MONOCYTES NFR BLD AUTO: 10 %
NEUTROPHILS # BLD AUTO: 3.8 10E3/UL (ref 1.6–8.3)
NEUTROPHILS NFR BLD AUTO: 70 %
NRBC # BLD AUTO: 0 10E3/UL
NRBC BLD AUTO-RTO: 0 /100
NT-PROBNP SERPL-MCNC: 7970 PG/ML (ref 0–900)
PLATELET # BLD AUTO: 271 10E3/UL (ref 150–450)
POTASSIUM SERPL-SCNC: 3.6 MMOL/L (ref 3.4–5.3)
PROT SERPL-MCNC: 6.4 G/DL (ref 6.4–8.3)
RBC # BLD AUTO: 3.66 10E6/UL (ref 4.4–5.9)
RETICS # AUTO: 0.06 10E6/UL (ref 0.03–0.1)
RETICS/RBC NFR AUTO: 1.6 % (ref 0.5–2)
SODIUM SERPL-SCNC: 133 MMOL/L (ref 135–145)
TROPONIN T SERPL HS-MCNC: 35 NG/L
TROPONIN T SERPL HS-MCNC: 36 NG/L
VIT B12 SERPL-MCNC: 414 PG/ML (ref 232–1245)
WBC # BLD AUTO: 5.5 10E3/UL (ref 4–11)

## 2023-12-25 PROCEDURE — 93010 ELECTROCARDIOGRAM REPORT: CPT | Performed by: EMERGENCY MEDICINE

## 2023-12-25 PROCEDURE — 84484 ASSAY OF TROPONIN QUANT: CPT | Performed by: EMERGENCY MEDICINE

## 2023-12-25 PROCEDURE — 250N000011 HC RX IP 250 OP 636: Mod: JZ | Performed by: EMERGENCY MEDICINE

## 2023-12-25 PROCEDURE — 82040 ASSAY OF SERUM ALBUMIN: CPT | Performed by: EMERGENCY MEDICINE

## 2023-12-25 PROCEDURE — 36415 COLL VENOUS BLD VENIPUNCTURE: CPT | Performed by: EMERGENCY MEDICINE

## 2023-12-25 PROCEDURE — 250N000011 HC RX IP 250 OP 636: Performed by: EMERGENCY MEDICINE

## 2023-12-25 PROCEDURE — 36415 COLL VENOUS BLD VENIPUNCTURE: CPT | Performed by: STUDENT IN AN ORGANIZED HEALTH CARE EDUCATION/TRAINING PROGRAM

## 2023-12-25 PROCEDURE — 250N000013 HC RX MED GY IP 250 OP 250 PS 637: Performed by: STUDENT IN AN ORGANIZED HEALTH CARE EDUCATION/TRAINING PROGRAM

## 2023-12-25 PROCEDURE — 120N000001 HC R&B MED SURG/OB

## 2023-12-25 PROCEDURE — 99285 EMERGENCY DEPT VISIT HI MDM: CPT | Mod: 25 | Performed by: EMERGENCY MEDICINE

## 2023-12-25 PROCEDURE — 85379 FIBRIN DEGRADATION QUANT: CPT | Performed by: EMERGENCY MEDICINE

## 2023-12-25 PROCEDURE — 82607 VITAMIN B-12: CPT | Performed by: STUDENT IN AN ORGANIZED HEALTH CARE EDUCATION/TRAINING PROGRAM

## 2023-12-25 PROCEDURE — 250N000011 HC RX IP 250 OP 636: Performed by: STUDENT IN AN ORGANIZED HEALTH CARE EDUCATION/TRAINING PROGRAM

## 2023-12-25 PROCEDURE — 85025 COMPLETE CBC W/AUTO DIFF WBC: CPT | Performed by: EMERGENCY MEDICINE

## 2023-12-25 PROCEDURE — 258N000003 HC RX IP 258 OP 636: Performed by: STUDENT IN AN ORGANIZED HEALTH CARE EDUCATION/TRAINING PROGRAM

## 2023-12-25 PROCEDURE — G0378 HOSPITAL OBSERVATION PER HR: HCPCS

## 2023-12-25 PROCEDURE — 85045 AUTOMATED RETICULOCYTE COUNT: CPT | Performed by: STUDENT IN AN ORGANIZED HEALTH CARE EDUCATION/TRAINING PROGRAM

## 2023-12-25 PROCEDURE — 99221 1ST HOSP IP/OBS SF/LOW 40: CPT | Mod: AI | Performed by: STUDENT IN AN ORGANIZED HEALTH CARE EDUCATION/TRAINING PROGRAM

## 2023-12-25 PROCEDURE — 82728 ASSAY OF FERRITIN: CPT | Performed by: STUDENT IN AN ORGANIZED HEALTH CARE EDUCATION/TRAINING PROGRAM

## 2023-12-25 PROCEDURE — 250N000009 HC RX 250: Performed by: EMERGENCY MEDICINE

## 2023-12-25 PROCEDURE — 83550 IRON BINDING TEST: CPT | Performed by: STUDENT IN AN ORGANIZED HEALTH CARE EDUCATION/TRAINING PROGRAM

## 2023-12-25 PROCEDURE — 71275 CT ANGIOGRAPHY CHEST: CPT | Mod: ME

## 2023-12-25 PROCEDURE — 93005 ELECTROCARDIOGRAM TRACING: CPT | Performed by: EMERGENCY MEDICINE

## 2023-12-25 PROCEDURE — 96374 THER/PROPH/DIAG INJ IV PUSH: CPT | Mod: 59 | Performed by: EMERGENCY MEDICINE

## 2023-12-25 PROCEDURE — 83880 ASSAY OF NATRIURETIC PEPTIDE: CPT | Performed by: EMERGENCY MEDICINE

## 2023-12-25 PROCEDURE — 250N000013 HC RX MED GY IP 250 OP 250 PS 637: Performed by: INTERNAL MEDICINE

## 2023-12-25 RX ORDER — ATORVASTATIN CALCIUM 80 MG/1
80 TABLET, FILM COATED ORAL DAILY
Status: DISCONTINUED | OUTPATIENT
Start: 2023-12-25 | End: 2023-12-28 | Stop reason: HOSPADM

## 2023-12-25 RX ORDER — ESCITALOPRAM OXALATE 10 MG/1
10 TABLET ORAL DAILY
Status: DISCONTINUED | OUTPATIENT
Start: 2023-12-25 | End: 2023-12-28 | Stop reason: HOSPADM

## 2023-12-25 RX ORDER — HYDROXYZINE HYDROCHLORIDE 10 MG/1
10 TABLET, FILM COATED ORAL 3 TIMES DAILY PRN
Status: ON HOLD | COMMUNITY
End: 2023-12-28

## 2023-12-25 RX ORDER — AMIODARONE HYDROCHLORIDE 200 MG/1
200 TABLET ORAL DAILY
Status: DISCONTINUED | OUTPATIENT
Start: 2023-12-25 | End: 2023-12-28 | Stop reason: HOSPADM

## 2023-12-25 RX ORDER — METHYLPREDNISOLONE SODIUM SUCCINATE 125 MG/2ML
125 INJECTION, POWDER, LYOPHILIZED, FOR SOLUTION INTRAMUSCULAR; INTRAVENOUS
Status: DISCONTINUED | OUTPATIENT
Start: 2023-12-25 | End: 2023-12-28 | Stop reason: HOSPADM

## 2023-12-25 RX ORDER — CALCIUM CARBONATE 500 MG/1
1000 TABLET, CHEWABLE ORAL 4 TIMES DAILY PRN
Status: DISCONTINUED | OUTPATIENT
Start: 2023-12-25 | End: 2023-12-28 | Stop reason: HOSPADM

## 2023-12-25 RX ORDER — CLOPIDOGREL BISULFATE 75 MG/1
75 TABLET ORAL DAILY
Status: DISCONTINUED | OUTPATIENT
Start: 2023-12-25 | End: 2023-12-28 | Stop reason: HOSPADM

## 2023-12-25 RX ORDER — LIDOCAINE 40 MG/G
CREAM TOPICAL
Status: DISCONTINUED | OUTPATIENT
Start: 2023-12-25 | End: 2023-12-28 | Stop reason: HOSPADM

## 2023-12-25 RX ORDER — TRAZODONE HYDROCHLORIDE 50 MG/1
25 TABLET, FILM COATED ORAL
COMMUNITY
End: 2024-02-02

## 2023-12-25 RX ORDER — FUROSEMIDE 10 MG/ML
40 INJECTION INTRAMUSCULAR; INTRAVENOUS ONCE
Status: COMPLETED | OUTPATIENT
Start: 2023-12-25 | End: 2023-12-25

## 2023-12-25 RX ORDER — IOPAMIDOL 755 MG/ML
67 INJECTION, SOLUTION INTRAVASCULAR ONCE
Status: COMPLETED | OUTPATIENT
Start: 2023-12-25 | End: 2023-12-25

## 2023-12-25 RX ORDER — AMOXICILLIN 250 MG
2 CAPSULE ORAL 2 TIMES DAILY PRN
Status: DISCONTINUED | OUTPATIENT
Start: 2023-12-25 | End: 2023-12-28 | Stop reason: HOSPADM

## 2023-12-25 RX ORDER — DIPHENHYDRAMINE HYDROCHLORIDE 50 MG/ML
50 INJECTION INTRAMUSCULAR; INTRAVENOUS
Status: DISCONTINUED | OUTPATIENT
Start: 2023-12-25 | End: 2023-12-28 | Stop reason: HOSPADM

## 2023-12-25 RX ORDER — AMOXICILLIN 250 MG
1 CAPSULE ORAL 2 TIMES DAILY PRN
Status: DISCONTINUED | OUTPATIENT
Start: 2023-12-25 | End: 2023-12-28 | Stop reason: HOSPADM

## 2023-12-25 RX ADMIN — IRON SUCROSE 300 MG: 20 INJECTION, SOLUTION INTRAVENOUS at 17:53

## 2023-12-25 RX ADMIN — Medication 10 MG: at 22:24

## 2023-12-25 RX ADMIN — IOPAMIDOL 67 ML: 755 INJECTION, SOLUTION INTRAVENOUS at 09:47

## 2023-12-25 RX ADMIN — FUROSEMIDE 40 MG: 10 INJECTION, SOLUTION INTRAMUSCULAR; INTRAVENOUS at 17:10

## 2023-12-25 RX ADMIN — APIXABAN 5 MG: 5 TABLET, FILM COATED ORAL at 20:50

## 2023-12-25 RX ADMIN — SODIUM CHLORIDE 94 ML: 9 INJECTION, SOLUTION INTRAVENOUS at 09:47

## 2023-12-25 RX ADMIN — FUROSEMIDE 40 MG: 10 INJECTION, SOLUTION INTRAMUSCULAR; INTRAVENOUS at 10:42

## 2023-12-25 ASSESSMENT — ACTIVITIES OF DAILY LIVING (ADL)
ADLS_ACUITY_SCORE: 20
ADLS_ACUITY_SCORE: 35
ADLS_ACUITY_SCORE: 20
ADLS_ACUITY_SCORE: 35
ADLS_ACUITY_SCORE: 20
ADLS_ACUITY_SCORE: 35

## 2023-12-25 NOTE — MEDICATION SCRIBE - ADMISSION MEDICATION HISTORY
Medication Scribe Admission Medication History    Admission medication history is complete. The information provided in this note is only as accurate as the sources available at the time of the update.    Information Source(s): Patient, Family member, Clinic records, and CareEverywhere/SureScripts via  in patient room with spouse and finished at desk.    Pertinent Information: Patient had Trazodone 25 mg prn for sleep and Hydroxyzine 10 mg prn for anxiety on PTA med list.  It was recently removed as patient stated that it didn't work that well.  He took both of them last night.  Will take Losartan if BP is 100 or over.  Will take Metoprolol if BP is 100 or over.     Changes made to PTA medication list:  Added: Trazodone 25 mg, Hydroxyzine 10 mg, Melatonin 5 mg.  Deleted: Melatonin 10 mg.  Changed: None    Medication Affordability:  Not including over the counter (OTC) medications, was there a time in the past 3 months when you did not take your medications as prescribed because of cost?: No    Allergies reviewed with patient and spouse and updates made in EHR: yes, no change.    Medication History Completed By: Brisa Pyle 12/25/2023 2:35 PM    PTA Med List   Medication Sig Note Last Dose    acetaminophen (TYLENOL) 325 MG tablet Take 650 mg by mouth every 6 hours as needed  More than a month at Unknown    amiodarone (PACERONE) 200 MG tablet Take 1 tablet (200 mg) by mouth daily  12/25/2023 at am    apixaban ANTICOAGULANT (ELIQUIS) 5 MG tablet Take 1 tablet (5 mg) by mouth 2 times daily  12/25/2023 at am    atorvastatin (LIPITOR) 80 MG tablet Take 1 tablet (80 mg) by mouth daily  12/24/2023 at pm    cetirizine (ZYRTEC) 10 MG tablet Take 10 mg by mouth daily  12/25/2023 at am    clopidogrel (PLAVIX) 75 MG tablet Take 1 tablet (75 mg) by mouth daily  12/25/2023 at am    escitalopram (LEXAPRO) 10 MG tablet Take 1 tablet (10 mg) by mouth daily  12/25/2023 at am    fish oil-omega-3 fatty acids 1000 MG capsule Take 1 g  by mouth 2 times daily Also contains another supplement  12/25/2023 at am    furosemide (LASIX) 20 MG tablet Take 1 tablet (20 mg) by mouth daily  More than a month at on hold    hydrOXYzine HCl (ATARAX) 10 MG tablet Take 10 mg by mouth 3 times daily as needed for anxiety  12/24/2023 at pm    losartan (COZAAR) 25 MG tablet Take 0.5 tablets (12.5 mg) by mouth daily 12/25/2023: Only takes if BP is 100 or over. 12/25/2023 at am    melatonin 5 MG tablet Take 5 mg by mouth at bedtime  12/24/2023 at hs    metoprolol succinate ER (TOPROL XL) 25 MG 24 hr tablet Take 0.5 tablets (12.5 mg) by mouth daily 12/25/2023: Only takes if BP is 90 or above. 12/24/2023 at pm    multivitamin w/minerals (THERA-VIT-M) tablet Take 1 tablet by mouth daily  12/25/2023 at am    traZODone (DESYREL) 50 MG tablet Take 25 mg by mouth nightly as needed for sleep  12/24/2023 at hs    vitamin C (ASCORBIC ACID) 1000 MG TABS Take 1,000 mg by mouth daily  12/25/2023 at am

## 2023-12-25 NOTE — ED TRIAGE NOTES
Pt here with SOB for 5 day, pt is unable to sleep because he can't breathe. Pt has a strong cardiac hx. Pt had a recent cardiac arrest with cracked ribs from the denisha device.   O2 sats 95% on RA. Denies chest pain.    Triage Assessment (Adult)       Row Name 12/25/23 0736          Triage Assessment    Airway WDL WDL        Respiratory WDL    Respiratory WDL X;rhythm/pattern     Rhythm/Pattern, Respiratory shortness of breath        Cardiac WDL    Cardiac WDL WDL        Cognitive/Neuro/Behavioral WDL    Cognitive/Neuro/Behavioral WDL WDL

## 2023-12-25 NOTE — ED PROVIDER NOTES
History     Chief Complaint   Patient presents with    Shortness of Breath     SOB 5 days, can't sleep because he can't breathe     HPI  Duane C Johnson is a 73 year old male with past medical history significant for recent stenting on 2 9/26/2023 for ST elevation MI followed by cardiac arrest on 1027 with history of heart failure COPD abuse anxiety who presents emergency department complaining of shortness of breath and difficulty sleeping.  Patient states over the past 4-5 nights he has not been able to sleep because he has to concentrate on his breathing to breathe well.  States when he stops trying to concentrate he becomes short of breath.  States it does not matter whether he sitting up or lying down he just cannot get sleep.  Denies any recent fevers or chills has not had any headache or visual changes.  He is not having any chest pain at this time.  Denies any nausea vomiting diarrhea or abdominal pain has not had any bowel or bladder dysfunction denies any leg swelling has not had any focal numbness weakness in extremity.  He denies any chest pain or back pain.  Denies any swelling in his legs.  He is on Plavix and Eliquis.    Allergies:  Allergies   Allergen Reactions    Brilinta [Ticagrelor]      Per pt and spouse, hyperventilation, difficulty breathing    Clonazepam Other (See Comments)     Per spouse, acted like a zombie and he was shaky, could barely talk.       Problem List:    Patient Active Problem List    Diagnosis Date Noted    Anxiety 11/13/2023     Priority: Medium    Congestive heart failure, unspecified HF chronicity, unspecified heart failure type (H) 11/13/2023     Priority: Medium    ST elevation myocardial infarction involving left anterior descending (LAD) coronary artery (H) 11/13/2023     Priority: Medium    Acute respiratory failure with hypoxia (H) 11/07/2023     Priority: Medium    Multifocal pneumonia 11/07/2023     Priority: Medium    Elevated brain natriuretic peptide (BNP)  level 11/07/2023     Priority: Medium    Primary hypertension 11/07/2023     Priority: Medium    SOB (shortness of breath) 11/07/2023     Priority: Medium    Coronary artery disease due to calcified coronary lesion 11/07/2023     Priority: Medium    Pneumonia of left upper lobe due to infectious organism 11/07/2023     Priority: Medium    Systolic CHF (H) 11/07/2023     Priority: Medium    Hx of cardiac arrest 10/27/2023     Priority: Medium    ST elevation MI (STEMI) (H) 10/26/2023     Priority: Medium    Prostate cancer (H) 09/23/2023     Priority: Medium    Hyperlipidemia LDL goal <130 01/26/2016     Priority: Medium    Adiposity 05/06/2010     Priority: Medium    Tobacco dependence syndrome 01/23/2008     Priority: Medium        Past Medical History:    Past Medical History:   Diagnosis Date    Benign essential hypertension     CAD (coronary artery disease)     Chronic systolic heart failure (H)     Hypertension     ST elevation myocardial infarction (STEMI), unspecified artery (H)     Ventricular fibrillation (H)        Past Surgical History:    Past Surgical History:   Procedure Laterality Date    COLONOSCOPY N/A 3/23/2023    Procedure: COLONOSCOPY, FLEXIBLE, WITH LESION REMOVAL USING SNARE;  Surgeon: Jose Faustin MD;  Location: [facility]    CV CENTRAL VENOUS CATHETER PLACEMENT N/A 10/26/2023    Procedure: Central Venous Catheter Placement;  Surgeon: Rob Lyles MD;  Location: [facility] CARDIAC CATH LAB    CV CORONARY ANGIOGRAM N/A 10/27/2023    Procedure: Coronary Angiogram;  Surgeon: Rob Lyles MD;  Location: [facility] CARDIAC CATH LAB    CV CORONARY ANGIOGRAM N/A 10/26/2023    Procedure: Coronary Angiogram;  Surgeon: Rob Lyles MD;  Location: [facility] CARDIAC CATH LAB    CV LEFT HEART CATH N/A 10/26/2023    Procedure: Left Heart Catheterization;  Surgeon: Rob Lyles MD;  Location: [facility] CARDIAC CATH LAB    CV PCI N/A 10/27/2023    Procedure:  "Percutaneous Coronary Intervention;  Surgeon: Rob Lyles MD;  Location:  HEART CARDIAC CATH LAB    CV PCI STENT DRUG ELUTING N/A 10/26/2023    Procedure: Percutaneous Coronary Intervention Stent;  Surgeon: Rob Lyles MD;  Location:  HEART CARDIAC CATH LAB       Family History:    Family History   Problem Relation Age of Onset    Other Cancer Mother     Obesity Mother     Cervical Cancer Sister     GI problems Father     Other Cancer Sister        Social History:  Marital Status:   [2]  Social History     Tobacco Use    Smoking status: Former     Packs/day: .25     Types: Cigarettes     Passive exposure: Current    Smokeless tobacco: Never    Tobacco comments:     Quit 10/26/2023   Vaping Use    Vaping Use: Never used   Substance Use Topics    Alcohol use: Yes     Comment: occas    Drug use: No        Medications:    acetaminophen (TYLENOL) 325 MG tablet  amiodarone (PACERONE) 200 MG tablet  apixaban ANTICOAGULANT (ELIQUIS) 5 MG tablet  atorvastatin (LIPITOR) 80 MG tablet  cetirizine (ZYRTEC) 10 MG tablet  clopidogrel (PLAVIX) 75 MG tablet  escitalopram (LEXAPRO) 10 MG tablet  fish oil-omega-3 fatty acids 1000 MG capsule  furosemide (LASIX) 20 MG tablet  losartan (COZAAR) 25 MG tablet  melatonin 10 MG TABS tablet  metoprolol succinate ER (TOPROL XL) 25 MG 24 hr tablet  multivitamin w/minerals (THERA-VIT-M) tablet  vitamin C (ASCORBIC ACID) 1000 MG TABS          Review of Systems  As per HPI.  Physical Exam   BP: 121/78  Pulse: 52  Temp: 97.6  F (36.4  C)  Resp: 21  Height: 167.6 cm (5' 6\")  Weight: 62.6 kg (138 lb)  SpO2: 95 %      Physical Exam  Vitals and nursing note reviewed.   Constitutional:       Appearance: He is not ill-appearing, toxic-appearing or diaphoretic.      Comments: Thin frail male who appears older than stated age.   HENT:      Head: Normocephalic and atraumatic.      Nose: Nose normal.      Mouth/Throat:      Mouth: Mucous membranes are moist.      Pharynx: " Oropharynx is clear.   Eyes:      Conjunctiva/sclera: Conjunctivae normal.   Cardiovascular:      Rate and Rhythm: Normal rate and regular rhythm.      Pulses: Normal pulses.      Heart sounds: Normal heart sounds. No murmur heard.     No friction rub.   Pulmonary:      Comments: Breath sounds slightly decreased at bases with faint crackles bilateral lower lung fields.  No rhonchi wheezes or rales are heard.  Abdominal:      General: Abdomen is flat.      Palpations: Abdomen is soft.      Tenderness: There is no abdominal tenderness. There is no right CVA tenderness or left CVA tenderness.   Musculoskeletal:         General: No swelling or tenderness. Normal range of motion.      Cervical back: Normal range of motion and neck supple.      Right lower leg: No edema.      Left lower leg: No edema.   Skin:     General: Skin is warm and dry.      Capillary Refill: Capillary refill takes less than 2 seconds.      Findings: No rash.   Neurological:      General: No focal deficit present.      Mental Status: He is alert and oriented to person, place, and time.      Sensory: No sensory deficit.      Motor: No weakness.      Coordination: Coordination normal.   Psychiatric:         Mood and Affect: Mood normal.         ED Course                 Procedures              EKG Interpretation:      Interpreted by Guero Gardner MD  Rhythm: sinus bradycardia  Rate: 50-60  Axis: Normal  Ectopy: none  Conduction: normal low voltage in precordial leads  ST Segments/ T Waves: Non-specific ST-T wave changes  Q Waves: v1 and v2  Comparison to prior: Unchanged from 12/20/23 except frequent PVCs probably 3    Clinical Impression: Sinus bradycardia  with decreased voltage in precordial leads with nonspecific ST-T changes.    Critical Care time:  none               Results for orders placed or performed during the hospital encounter of 12/25/23 (from the past 24 hour(s))   CBC with platelets differential    Narrative    The following  orders were created for panel order CBC with platelets differential.  Procedure                               Abnormality         Status                     ---------                               -----------         ------                     CBC with platelets and d...[[id number]]  Abnormal            Final result                 Please view results for these tests on the individual orders.   Comprehensive metabolic panel   Result Value Ref Range    Sodium 133 (L) 135 - 145 mmol/L    Potassium 3.6 3.4 - 5.3 mmol/L    Carbon Dioxide (CO2) 25 22 - 29 mmol/L    Anion Gap 9 7 - 15 mmol/L    Urea Nitrogen 6.6 (L) 8.0 - 23.0 mg/dL    Creatinine 0.85 0.67 - 1.17 mg/dL    GFR Estimate >90 >60 mL/min/1.73m2    Calcium 8.7 (L) 8.8 - 10.2 mg/dL    Chloride 99 98 - 107 mmol/L    Glucose 92 70 - 99 mg/dL    Alkaline Phosphatase 107 40 - 150 U/L    AST 25 0 - 45 U/L    ALT 23 0 - 70 U/L    Protein Total 6.4 6.4 - 8.3 g/dL    Albumin 3.4 (L) 3.5 - 5.2 g/dL    Bilirubin Total 0.7 <=1.2 mg/dL   Troponin T, High Sensitivity   Result Value Ref Range    Troponin T, High Sensitivity 36 (H) <=22 ng/L   D dimer quantitative   Result Value Ref Range    D-Dimer Quantitative 0.87 (H) 0.00 - 0.50 ug/mL FEU    Narrative    This D-dimer assay is intended for use in conjunction with a clinical pretest probability assessment model to exclude pulmonary embolism (PE) and deep venous thrombosis (DVT) in outpatients suspected of PE or DVT. The cut-off value is 0.50 ug/mL FEU.    For patients 50 years of age or older, the application of age-adjusted cut-off values for D-Dimer may increase the specificity without significant effect on sensitivity. The literature suggested calculation age adjusted cut-off in ug/L = age in years x 10 ug/L. The results in this laboratory are reported as ug/mL rather than ug/L. The calculation for age adjusted cut off in ug/mL= age in years x 0.01 ug/mL. For example, the cut off for a 76 year old male is 76 x 0.01 ug/mL =  0.76 ug/mL (760 ug/L).    M Jose Carlos et al. Age adjusted D-dimer cut-off levels to rule out pulmonary embolism: The ADJUST-PE Study. RONNELL 2014;311:8045-2598.; HJ Chel et al. Diagnostic accuracy of conventional or age adjusted D-dimer cutoff values in older patients with suspected venous thromboembolism. Systemic review and meta-analysis. BMJ 2013:346:f2492.   Nt probnp inpatient (BNP)   Result Value Ref Range    N terminal Pro BNP Inpatient 7,970 (H) 0 - 900 pg/mL   CBC with platelets and differential   Result Value Ref Range    WBC Count 5.5 4.0 - 11.0 10e3/uL    RBC Count 3.66 (L) 4.40 - 5.90 10e6/uL    Hemoglobin 11.6 (L) 13.3 - 17.7 g/dL    Hematocrit 34.4 (L) 40.0 - 53.0 %    MCV 94 78 - 100 fL    MCH 31.7 26.5 - 33.0 pg    MCHC 33.7 31.5 - 36.5 g/dL    RDW 13.5 10.0 - 15.0 %    Platelet Count 271 150 - 450 10e3/uL    % Neutrophils 70 %    % Lymphocytes 16 %    % Monocytes 10 %    % Eosinophils 3 %    % Basophils 1 %    % Immature Granulocytes 0 %    NRBCs per 100 WBC 0 <1 /100    Absolute Neutrophils 3.8 1.6 - 8.3 10e3/uL    Absolute Lymphocytes 0.9 0.8 - 5.3 10e3/uL    Absolute Monocytes 0.5 0.0 - 1.3 10e3/uL    Absolute Eosinophils 0.2 0.0 - 0.7 10e3/uL    Absolute Basophils 0.1 0.0 - 0.2 10e3/uL    Absolute Immature Granulocytes 0.0 <=0.4 10e3/uL    Absolute NRBCs 0.0 10e3/uL   CT Chest Pulmonary Embolism w Contrast    Narrative    EXAM: CT CHEST PULMONARY EMBOLISM WITH CONTRAST  LOCATION: [facility]  DATE: 12/25/2023    INDICATION: Shortness of breath. History of recent stenting. Cardiac arrest.  COMPARISON: None.  TECHNIQUE: CT chest pulmonary angiogram during arterial phase injection of IV contrast. Multiplanar reformats and MIP reconstructions were performed. Dose reduction techniques were used.   CONTRAST: 67 mL Isovue 370.    FINDINGS:  ANGIOGRAM CHEST: Pulmonary arteries are normal caliber and negative for pulmonary emboli. Thoracic aorta is negative for dissection. No  CT evidence of right heart strain.    LUNGS AND PLEURA: Moderate bilateral layering pleural effusions. Increased interstitial markings, particularly at the lung apices likely reflecting underlying interstitial pulmonary edema. Mild perihilar groundglass opacity also likely reflecting   interstitial pulmonary edema. Bilateral dependent atelectasis.    MEDIASTINUM/AXILLAE: Mildly enlarged heart. No pericardial effusion. No hilar or mediastinal lymphadenopathy.    CORONARY ARTERY CALCIFICATION: Moderate with a stent.    UPPER ABDOMEN: Cholelithiasis.    MUSCULOSKELETAL: Mild multilevel discogenic degenerative change.      Impression    IMPRESSION:  1.  No pulmonary embolus, aortic dissection, or aneurysm.  2.  Mildly enlarged heart with coronary artery disease and pulmonary edema.  3.  Moderate bilateral layering pleural effusions with associated compressive atelectasis.  4.  Cholelithiasis.     Troponin T, High Sensitivity   Result Value Ref Range    Troponin T, High Sensitivity 35 (H) <=22 ng/L   Iron & Iron Binding Capacity   Result Value Ref Range    Iron 30 (L) 61 - 157 ug/dL    Iron Binding Capacity 193 (L) 240 - 430 ug/dL    Iron Sat Index 16 15 - 46 %       Medications - No data to display    Assessments & Plan (with Medical Decision Making) records were reviewed.  Past medical history medications and allergies were reviewed.  ED visit from 12/20/2023 was reviewed extensively as well as ED visit from 12/18/2023 admission from 11/12/2023.  EKG revealed a normal sinus rhythm with nonspecific ST-T wave abnormalities no significant change from previous.  Labs were obtained I independently reviewed and interpreted labs.  Patient's white count was 5.5 hemoglobin this 11.6 platelet count 271.  Comprehensive metabolic panel significant for sodium of 133 otherwise unremarkable.  D-dimer was elevated 0.87 proBNP was elevated at 7970 which is actually improved from previous.  Initial troponin was 86-second troponin was  35.  Due to elevated D-dimer CT PE protocol was obtained.  I independently reviewed this.  This revealed no pulmonary embolism aortic dissection or aneurysm.  Mildly enlarged heart with coronary artery disease and pulmonary edema.  There was moderate bilateral layering pleural effusions with associated compressive atelectatic changes.  Due to these findings patient was given IV Lasix.  He had been at on Lasix prior to being discharged from the hospital but has been off of this since then.  He did had a large amount of urine but still felt short of breath with ambulation although his sats remained between 91 and 95%.  When he was lying down he felt short of breath also I feel patient would best off diuresing over the next evening and tomorrow and seeing if his symptoms improved if not improving an echo may be warranted.  Findings discussed with Dr. Rg with hospitalist service and he is agree with admission.  Patient feels comfortable being admitted at this time.     I have reviewed the nursing notes.    I have reviewed the findings, diagnosis, plan and need for follow up with the patient.         Current Discharge Medication List          Final diagnoses:   Acute on chronic congestive heart failure, unspecified heart failure type (H)       12/25/2023   [facility] EMERGENCY DEPT       Guero Gardner MD  12/26/23 [id number]

## 2023-12-25 NOTE — ED NOTES
Pt was able to ambulate hallway and maintained O2 saturation above 94% RA and HR in 60's. MD notified on how pt tolerated ambulation.

## 2023-12-25 NOTE — TELEPHONE ENCOUNTER
I was paged by the patient due to concern for new onset dyspnea. Has been occurring for the past five days. Mostly stable over that time period. Dyspnea is predominantly noticed while trying to lay flat at night. Has been having to use a recliner to try and get some sleep though this has remained difficult due to the profound nature of his dyspnea. He does also note requiring some amount of pursed lip breathing which he states helps his symptoms. Denies other associated symptoms such as chest pain or exertional intolerance beyond his baseline. Also reports checking weights and while these have only had mild fluctuation he does report softer than normal blood pressures (90s systolics) and a pulse ox reading that can vary between 85 - 95. Given his constellation of vital sign changes in addition to his described orthopnea in the setting of known ischemic cardiomyopathy with a recent STEMI and VT/VF arrest I advised he present to the ER for formal evaluation particularly given the holiday which would likely prolong his evaluation on an outpatient basis. This would enable rule out of a cardiopulmonary process such as ADHF, ACS, or COPD exacerbation. The patient was agreeable to this plan.     Maxwell Green MD  Cardiology Fellow

## 2023-12-25 NOTE — PLAN OF CARE
"[facility] ADMISSION NOTE    Patient admitted to room 2308 at approximately 1545 via wheel chair from emergency room. Patient was accompanied by transport tech.     Verbal SBAR report received from ED RN prior to patient arrival.     Patient trasferred to bed via self. Patient alert and oriented X 3. The patient is not having any pain.  . Admission vital signs: Blood pressure 115/77, pulse 57, temperature 98.7  F (37.1  C), temperature source Oral, resp. rate 20, height 1.676 m (5' 6\"), weight 61.1 kg (134 lb 11.2 oz), SpO2 96%. Patient was oriented to plan of care, call light, bed controls, tv, telephone, bathroom, and visiting hours.     Risk Assessment    The following safety risks were identified during admission: none. Yellow risk band applied: NO.     Skin Initial Assessment    This writer admitted this patient and completed a full skin assessment and Mehdi score in the Adult PCS flowsheet. Appropriate interventions initiated as needed.     Secondary skin check completed by Fabiana.    Mehdi Risk Assessment  Sensory Perception: 4-->no impairment  Moisture: 4-->rarely moist  Activity: 3-->walks occasionally  Mobility: 4-->no limitation  Nutrition: 3-->adequate  Friction and Shear: 3-->no apparent problem  Mehdi Score: 21  Mattress: Standard gel/foam mattress (IsoFlex, Atmos Air, etc.)  Bed Frame: Standard width and length    Education    Patient has a [place] to Observation order: Yes  Observation education completed and documented: Yes      Josefina Kirkland RN                          "

## 2023-12-25 NOTE — ED NOTES
"[facility]   Admission Handoff    The patient is Duane C Johnson, 73 year old who arrived in the ED by CAR from home with a complaint of Shortness of Breath (SOB 5 days, can't sleep because he can't breathe)  . The patient's current symptoms are new and during this time the symptoms have remained the same. In the ED, patient was diagnosed with   Final diagnoses:   Acute on chronic congestive heart failure, unspecified heart failure type (H)         Needed?: No    Allergies:    Allergies   Allergen Reactions    Brilinta [Ticagrelor] Other (See Comments) and Difficulty breathing     Per pt and spouse, hyperventilation.    Clonazepam Other (See Comments)     Per spouse, acted like a zombie and he was shaky, could barely talk.       Past Medical Hx:   Past Medical History:   Diagnosis Date    Benign essential hypertension     CAD (coronary artery disease)     Chronic systolic heart failure (H)     Hypertension     ST elevation myocardial infarction (STEMI), unspecified artery (H)     Ventricular fibrillation (H)        Initial vitals were: BP: 121/78  Pulse: 52  Temp: 97.6  F (36.4  C)  Resp: 21  Height: 167.6 cm (5' 6\")  Weight: 62.6 kg (138 lb)  SpO2: 95 %   Recent vital Signs: /72   Pulse 55   Temp 97.6  F (36.4  C) (Tympanic)   Resp 17   Ht 1.676 m (5' 6\")   Wt 62.6 kg (138 lb)   SpO2 91%   BMI 22.27 kg/m      Elimination Status: Continent: Yes     Activity Level: Independent    Fall Status: Reason for falls risk: Elimination  nonskid shoes/slippers when out of bed and patient and family education    Baseline Mental status: WDL  Current Mental Status changes: at basesline    Infection present or suspected this encounter: no  Sepsis suspected: No    Isolation type: NA    Bariatric equipment needed?: No    In the ED these meds were given:   Medications   iopamidol (ISOVUE-370) solution 67 mL (67 mLs Intravenous $Given 12/25/23 0682)   sodium chloride 0.9 % bag 500mL " for CT scan flush use (94 mLs Intravenous $Given 12/25/23 3202)   furosemide (LASIX) injection 40 mg (40 mg Intravenous $Given 12/25/23 1046)       Drips running?  No    Home pump  No    Current LDAs: Peripheral IV: Site L AC; Gauge 20  none     Results:   Labs/Imaging  Ordered and Resulted from Time of ED Arrival Up to the Time of Departure from the ED  Results for orders placed or performed during the hospital encounter of 12/25/23 (from the past 24 hour(s))   CBC with platelets differential    Narrative    The following orders were created for panel order CBC with platelets differential.  Procedure                               Abnormality         Status                     ---------                               -----------         ------                     CBC with platelets and d...[[id number]]  Abnormal            Final result                 Please view results for these tests on the individual orders.   Comprehensive metabolic panel   Result Value Ref Range    Sodium 133 (L) 135 - 145 mmol/L    Potassium 3.6 3.4 - 5.3 mmol/L    Carbon Dioxide (CO2) 25 22 - 29 mmol/L    Anion Gap 9 7 - 15 mmol/L    Urea Nitrogen 6.6 (L) 8.0 - 23.0 mg/dL    Creatinine 0.85 0.67 - 1.17 mg/dL    GFR Estimate >90 >60 mL/min/1.73m2    Calcium 8.7 (L) 8.8 - 10.2 mg/dL    Chloride 99 98 - 107 mmol/L    Glucose 92 70 - 99 mg/dL    Alkaline Phosphatase 107 40 - 150 U/L    AST 25 0 - 45 U/L    ALT 23 0 - 70 U/L    Protein Total 6.4 6.4 - 8.3 g/dL    Albumin 3.4 (L) 3.5 - 5.2 g/dL    Bilirubin Total 0.7 <=1.2 mg/dL   Troponin T, High Sensitivity   Result Value Ref Range    Troponin T, High Sensitivity 36 (H) <=22 ng/L   D dimer quantitative   Result Value Ref Range    D-Dimer Quantitative 0.87 (H) 0.00 - 0.50 ug/mL FEU    Narrative    This D-dimer assay is intended for use in conjunction with a clinical pretest probability assessment model to exclude pulmonary embolism (PE) and deep venous thrombosis (DVT) in outpatients suspected of  PE or DVT. The cut-off value is 0.50 ug/mL FEU.    For patients 50 years of age or older, the application of age-adjusted cut-off values for D-Dimer may increase the specificity without significant effect on sensitivity. The literature suggested calculation age adjusted cut-off in ug/L = age in years x 10 ug/L. The results in this laboratory are reported as ug/mL rather than ug/L. The calculation for age adjusted cut off in ug/mL= age in years x 0.01 ug/mL. For example, the cut off for a 76 year old male is 76 x 0.01 ug/mL = 0.76 ug/mL (760 ug/L).    M Jose Carlos et al. Age adjusted D-dimer cut-off levels to rule out pulmonary embolism: The ADJUST-PE Study. RONNELL 2014;311:5116-1995.; HJ Chel et al. Diagnostic accuracy of conventional or age adjusted D-dimer cutoff values in older patients with suspected venous thromboembolism. Systemic review and meta-analysis. BMJ 2013:346:f2492.   Nt probnp inpatient (BNP)   Result Value Ref Range    N terminal Pro BNP Inpatient 7,970 (H) 0 - 900 pg/mL   CBC with platelets and differential   Result Value Ref Range    WBC Count 5.5 4.0 - 11.0 10e3/uL    RBC Count 3.66 (L) 4.40 - 5.90 10e6/uL    Hemoglobin 11.6 (L) 13.3 - 17.7 g/dL    Hematocrit 34.4 (L) 40.0 - 53.0 %    MCV 94 78 - 100 fL    MCH 31.7 26.5 - 33.0 pg    MCHC 33.7 31.5 - 36.5 g/dL    RDW 13.5 10.0 - 15.0 %    Platelet Count 271 150 - 450 10e3/uL    % Neutrophils 70 %    % Lymphocytes 16 %    % Monocytes 10 %    % Eosinophils 3 %    % Basophils 1 %    % Immature Granulocytes 0 %    NRBCs per 100 WBC 0 <1 /100    Absolute Neutrophils 3.8 1.6 - 8.3 10e3/uL    Absolute Lymphocytes 0.9 0.8 - 5.3 10e3/uL    Absolute Monocytes 0.5 0.0 - 1.3 10e3/uL    Absolute Eosinophils 0.2 0.0 - 0.7 10e3/uL    Absolute Basophils 0.1 0.0 - 0.2 10e3/uL    Absolute Immature Granulocytes 0.0 <=0.4 10e3/uL    Absolute NRBCs 0.0 10e3/uL   CT Chest Pulmonary Embolism w Contrast    Narrative    EXAM: CT CHEST PULMONARY EMBOLISM WITH  CONTRAST  LOCATION: [facility]  DATE: 12/25/2023    INDICATION: Shortness of breath. History of recent stenting. Cardiac arrest.  COMPARISON: None.  TECHNIQUE: CT chest pulmonary angiogram during arterial phase injection of IV contrast. Multiplanar reformats and MIP reconstructions were performed. Dose reduction techniques were used.   CONTRAST: 67 mL Isovue 370.    FINDINGS:  ANGIOGRAM CHEST: Pulmonary arteries are normal caliber and negative for pulmonary emboli. Thoracic aorta is negative for dissection. No CT evidence of right heart strain.    LUNGS AND PLEURA: Moderate bilateral layering pleural effusions. Increased interstitial markings, particularly at the lung apices likely reflecting underlying interstitial pulmonary edema. Mild perihilar groundglass opacity also likely reflecting   interstitial pulmonary edema. Bilateral dependent atelectasis.    MEDIASTINUM/AXILLAE: Mildly enlarged heart. No pericardial effusion. No hilar or mediastinal lymphadenopathy.    CORONARY ARTERY CALCIFICATION: Moderate with a stent.    UPPER ABDOMEN: Cholelithiasis.    MUSCULOSKELETAL: Mild multilevel discogenic degenerative change.      Impression    IMPRESSION:  1.  No pulmonary embolus, aortic dissection, or aneurysm.  2.  Mildly enlarged heart with coronary artery disease and pulmonary edema.  3.  Moderate bilateral layering pleural effusions with associated compressive atelectasis.  4.  Cholelithiasis.     Troponin T, High Sensitivity   Result Value Ref Range    Troponin T, High Sensitivity 35 (H) <=22 ng/L       For the majority of the shift this patient's behavior was Green     Cardiac Rhythm: N/A  Pt needs tele? No  Skin/wound Issues: None    Code Status: Full Code    Pain control: pt had none    Nausea control: pt had none    Abnormal labs/tests/findings requiring intervention: BNP 7970    Patient tested for COVID 19 prior to admission: NO     OBS brochure/video discussed/provided to  patient/family: Yes     Family present during ED course? Yes     Family Comments/Social Situation comments: home ind with wife    Tasks needing completion: None    Aby Walls, RN

## 2023-12-25 NOTE — PROGRESS NOTES
Skin affirmation note    Admitting nurse completed full skin assessment, Mehdi score and Mehdi interventions. This writer agrees with the initial skin assessment findings.

## 2023-12-25 NOTE — ED NOTES
Pt states he was seen 12/20 with same s/s, not sure what he was dx with , was told at one point he had a cracked rib. Pt cont with SOB unable to sleep, unable to lie flat. Pt spouse called cardiologist who told him to come in for fluid in lungs. No edema.

## 2023-12-25 NOTE — H&P
[facility]    History and Physical - Hospitalist Service       Date of Admission:  12/25/2023    Assessment & Plan      Duane C Johnson is a 73 year old male admitted on 12/25/2023. He Has past medical history significant for recent NSTEMI status post BRENDA on dual therapy (Eliquis plus Plavix) was complicated by cardiac arrest.  He presents with shortness of breath onset since October when he had his coronary event and has progressively gotten worse.  This is the patient's 3rd ER visit for similar complaints. Initial workup revealed pleural effusions patient was admitted for diuresis for presumable acute decompensated heart failure.    SOB  Bilateral Pleural Effusions  Acute Decompensated Heart Failure  Shortness of breath likely related to acute decompensated heart failure with pulmonary edema and bilateral pleural effusions although due to patient habitus does not look grossly volume overload  Patient endorses shortness of breath that he thinks is related to anxiety and states it only occurs at night when he lays flat  CTPE: moderate bilateral layering pleural effusions. Interstitial pulmonary edema. (Effusions present in 11/2023 after AMI but on 03/15/23 before AMI had CT without effusions and small nodules in lungs)  Exam: Bibasilar Rales and diminished breath sounds bilaterally, no appreciable JVD (1 cm above clavicle with hepatojugular reflux), no lower extremity edema or abdominal distention  ER Course: Gave 1 dose of 40mg IV lasix with patient reporting 3 episodes of urination  EKG: sinus bradycardia. No acute ischemic changes and unchanged from prior grossly.  NTproBNP: 7.9k (was 8.3k 12/18_  Not requiring supplemental O2    Cardiology Heart Failure focused order set utilized  Will give single repeat dose of 40 mg IV Lasix redose as needed  Continue GDMT: ARB, beta-blocker with hold parameters  Consider PT/OT    CAD  History of AMI status post BRENDA (10/2023)  History of cardiac  arrest (VT/VF arrest around time of heart attack)  [facility] (10/26/2023): BRENDA of proximal to mid LAD.  100% distal LAD lesion, 80% first diagonal, 70% RPDA, 40% distal RCA  TTE: LVEF 30%  [facility] (11/3/2023): Enlarged LV with wall thinning and akinesis.  LGE in LAD territory consistent with large infarction.  Bilateral pleural effusions.  Consistent with ischemic cardiomyopathy and severely reduced LVEF.  Home meds: Plavix and Eliquis, Lipitor 80 mg, metoprolol 12.5 mg daily (Toprol-XL), Amiodarone    Patient on Eliquis due to large area of akinesis in the LAD territory (presumably to prevent LV thrombus formation)    Continue dual therapy and statin  Continue beta-blocker with hold parameters and continue amiodarone    Pelvic LAD  Prostate Cancer  Subjective left inguinal enlarged lymph node patient noticed today (I am unable to feel anything on exam)  Endorses prostate cancer diagnosis that was going to be surgically treated but the day before surgery was when patient had AMI    OP urology follow up recommended  Monitor of LAD          Diet:  Cardiac Diet 2g Na restriction  DVT Prophylaxis: DOAC  Michael Catheter: Not present  Lines: None     Cardiac Monitoring: None  Code Status:  Full Code- Discussed with patient and wife    Clinically Significant Risk Factors Present on Admission              # Hypoalbuminemia: Lowest albumin = 3.4 g/dL at 12/25/2023  8:07 AM, will monitor as appropriate  # Drug Induced Coagulation Defect: home medication list includes an anticoagulant medication  # Drug Induced Platelet Defect: home medication list includes an antiplatelet medication   # Hypertension: Noted on problem list  # Acute heart failure with reduced ejection fraction: last echo with EF <40% and receiving IV diuretics         # Financial/Environmental Concerns:           Disposition Plan      Expected Discharge Date: 12/26/2023                  Alton Rg DO  Hospitalist Service  [facility]  Securely  "message with Rhett (more info)  Text page via [facility] Paging/Directory     ______________________________________________________________________    Chief Complaint   SOB    History is obtained from the patient    History of Present Illness   Duane C Johnson is a 73 year old male who presents to [facility] ER with shortness of breath. PMHX prostate cancer not treated, CAD s/p BRENDA, Cardiac arrest, HTN, HLD. Patient states shortness of breath started in October of this year when patient had a acute myocardial infarction requiring drug-eluting stent and complicated by cardiac arrest with ventricular fib.  Patient states that after that hospitalization he continued to have shortness of breath that progressively got worse and resulted in him coming to the ER today.  Denies weight gain, lower extremity edema, decrease in urination however does endorse shortness of breath that primarily bothers him at night that he thinks is secondary to anxiety and only occurs when he lays flat.  Patient states that he was on a diuretic but after \"losing too much water\" he was taken off of this.  States that he has chronic chest pain since then however denies chest pain at this time.  Has been engaging in cardiac rehab and recently completed his second visit but endorses his exertion is limited by dyspnea.      Per chart review this is patient's third visit to the ER for similar complaints (12/18 and 12/20).    During visit patient is tangential and focuses on unrelated problems such as how he felt an enlarged lymph node in his right inguinal region that wasn't there the day before because \"they told me to check\" but is unable to tell me who asks him to check. He then proceeded to talk about different breathing strategies he uses at home (purse lip vs prolong exhale) and states he can increase his O2 sat from 95 to 96% while on room air. He is redirectable but easily goes off track.     Social:  Tobacco: former, quit the day he had his " heart attack  Alcohol: occasional  Illicit drugs: Denies    ER Course: CTPE showing moderate bilateral pleural effusions and pulmonary edema. Given 1 dose of 40mg IV lasix and patient reported going to urinate 3 times.    Patient admitted for SOB and volume overload suspicious for acute decompensated heart failure.       Past Medical History    Past Medical History:   Diagnosis Date    Benign essential hypertension     CAD (coronary artery disease)     Chronic systolic heart failure (H)     Hypertension     ST elevation myocardial infarction (STEMI), unspecified artery (H)     Ventricular fibrillation (H)        Past Surgical History   Past Surgical History:   Procedure Laterality Date    COLONOSCOPY N/A 3/23/2023    Procedure: COLONOSCOPY, FLEXIBLE, WITH LESION REMOVAL USING SNARE;  Surgeon: Jose Faustin MD;  Location: [facility]    CV CENTRAL VENOUS CATHETER PLACEMENT N/A 10/26/2023    Procedure: Central Venous Catheter Placement;  Surgeon: Rob Lyles MD;  Location: [facility] CARDIAC CATH LAB    CV CORONARY ANGIOGRAM N/A 10/27/2023    Procedure: Coronary Angiogram;  Surgeon: Rob Lyles MD;  Location: [facility] CARDIAC CATH LAB    CV CORONARY ANGIOGRAM N/A 10/26/2023    Procedure: Coronary Angiogram;  Surgeon: Rob Lyles MD;  Location: [facility] CARDIAC CATH LAB    CV LEFT HEART CATH N/A 10/26/2023    Procedure: Left Heart Catheterization;  Surgeon: Rob Lyles MD;  Location: [facility] CARDIAC CATH LAB    CV PCI N/A 10/27/2023    Procedure: Percutaneous Coronary Intervention;  Surgeon: Rob Lyles MD;  Location: [facility] CARDIAC CATH LAB    CV PCI STENT DRUG ELUTING N/A 10/26/2023    Procedure: Percutaneous Coronary Intervention Stent;  Surgeon: Rob Lyles MD;  Location: [facility] CARDIAC CATH LAB       Prior to Admission Medications   Prior to Admission Medications   Prescriptions Last Dose Informant Patient Reported? Taking?   acetaminophen  (TYLENOL) 325 MG tablet More than a month at Unknown Spouse/Significant Other Yes Yes   Sig: Take 650 mg by mouth every 6 hours as needed   amiodarone (PACERONE) 200 MG tablet 12/25/2023 at am Spouse/Significant Other No Yes   Sig: Take 1 tablet (200 mg) by mouth daily   apixaban ANTICOAGULANT (ELIQUIS) 5 MG tablet 12/25/2023 at am Spouse/Significant Other No Yes   Sig: Take 1 tablet (5 mg) by mouth 2 times daily   atorvastatin (LIPITOR) 80 MG tablet 12/24/2023 at pm Spouse/Significant Other No Yes   Sig: Take 1 tablet (80 mg) by mouth daily   cetirizine (ZYRTEC) 10 MG tablet 12/25/2023 at am Spouse/Significant Other Yes Yes   Sig: Take 10 mg by mouth daily   clopidogrel (PLAVIX) 75 MG tablet 12/25/2023 at am Spouse/Significant Other No Yes   Sig: Take 1 tablet (75 mg) by mouth daily   escitalopram (LEXAPRO) 10 MG tablet 12/25/2023 at am Spouse/Significant Other No Yes   Sig: Take 1 tablet (10 mg) by mouth daily   fish oil-omega-3 fatty acids 1000 MG capsule 12/25/2023 at am Spouse/Significant Other Yes Yes   Sig: Take 1 g by mouth 2 times daily Also contains another supplement   furosemide (LASIX) 20 MG tablet More than a month at on hold Spouse/Significant Other No Yes   Sig: Take 1 tablet (20 mg) by mouth daily   hydrOXYzine HCl (ATARAX) 10 MG tablet 12/24/2023 at pm Spouse/Significant Other Yes Yes   Sig: Take 10 mg by mouth 3 times daily as needed for anxiety   losartan (COZAAR) 25 MG tablet 12/25/2023 at am Spouse/Significant Other No Yes   Sig: Take 0.5 tablets (12.5 mg) by mouth daily   melatonin 5 MG tablet 12/24/2023 at hs Spouse/Significant Other Yes Yes   Sig: Take 5 mg by mouth at bedtime   metoprolol succinate ER (TOPROL XL) 25 MG 24 hr tablet 12/24/2023 at pm Spouse/Significant Other No Yes   Sig: Take 0.5 tablets (12.5 mg) by mouth daily   multivitamin w/minerals (THERA-VIT-M) tablet 12/25/2023 at am Spouse/Significant Other Yes Yes   Sig: Take 1 tablet by mouth daily   traZODone (DESYREL) 50 MG  tablet 12/24/2023 at hs Spouse/Significant Other Yes Yes   Sig: Take 25 mg by mouth nightly as needed for sleep   vitamin C (ASCORBIC ACID) 1000 MG TABS 12/25/2023 at am Spouse/Significant Other Yes Yes   Sig: Take 1,000 mg by mouth daily      Facility-Administered Medications: None        Review of Systems    The 10 point Review of Systems is negative other than noted in the HPI or here.     Social History   I have reviewed this patient's social history and updated it with pertinent information if needed.  Social History     Tobacco Use    Smoking status: Former     Packs/day: .25     Types: Cigarettes     Passive exposure: Current    Smokeless tobacco: Never    Tobacco comments:     Quit 10/26/2023   Vaping Use    Vaping Use: Never used   Substance Use Topics    Alcohol use: Yes     Comment: occas    Drug use: No         Family History   I have reviewed this patient's family history and updated it with pertinent information if needed.  Family History   Problem Relation Age of Onset    Other Cancer Mother     Obesity Mother     Cervical Cancer Sister     GI problems Father     Other Cancer Sister          Allergies   Allergies   Allergen Reactions    Brilinta [Ticagrelor] Other (See Comments) and Difficulty breathing     Per pt and spouse, hyperventilation.    Clonazepam Other (See Comments)     Per spouse, acted like a zombie and he was shaky, could barely talk.        Physical Exam   Vital Signs: Temp: 97.6  F (36.4  C) Temp src: Tympanic BP: 115/72 Pulse: 55   Resp: 17 SpO2: 91 % O2 Device: None (Room air)    Weight: 138 lbs 0 oz    Physical Exam  Constitutional:       Comments: Thin appearing   HENT:      Head: Normocephalic.      Nose: Nose normal.   Eyes:      General: No scleral icterus.  Cardiovascular:      Rate and Rhythm: Regular rhythm. Bradycardia present.      Pulses: Normal pulses.      Heart sounds: Normal heart sounds. No murmur heard.     No friction rub. No gallop.      Comments: No JVD  Negative  HJR  Arm veins collapse when raised to level of heart  Pulmonary:      Effort: Pulmonary effort is normal.      Breath sounds: Rales present. Wheezes: bibasilar.     Comments: Diminished bilaterally at bases  Abdominal:      General: Abdomen is flat. Bowel sounds are normal.      Palpations: Abdomen is soft.      Tenderness: There is no abdominal tenderness.   Musculoskeletal:      Right lower leg: No edema.      Left lower leg: No edema.   Skin:     General: Skin is warm.   Neurological:      Mental Status: He is alert.           Medical Decision Making       45 MINUTES SPENT BY ME on the date of service doing chart review, history, exam, documentation & further activities per the note.      Data     I have personally reviewed the following data over the past 24 hrs:    5.5  \   11.6 (L)   / 271     133 (L) 99 6.6 (L) /  92   3.6 25 0.85 \     ALT: 23 AST: 25 AP: 107 TBILI: 0.7   ALB: 3.4 (L) TOT PROTEIN: 6.4 LIPASE: N/A     Trop: 35 (H) BNP: 7,970 (H)     INR:  N/A PTT:  N/A   D-dimer:  0.87 (H) Fibrinogen:  N/A       Imaging results reviewed over the past 24 hrs:   Recent Results (from the past 24 hour(s))   CT Chest Pulmonary Embolism w Contrast    Narrative    EXAM: CT CHEST PULMONARY EMBOLISM WITH CONTRAST  LOCATION: [facility]  DATE: 12/25/2023    INDICATION: Shortness of breath. History of recent stenting. Cardiac arrest.  COMPARISON: None.  TECHNIQUE: CT chest pulmonary angiogram during arterial phase injection of IV contrast. Multiplanar reformats and MIP reconstructions were performed. Dose reduction techniques were used.   CONTRAST: 67 mL Isovue 370.    FINDINGS:  ANGIOGRAM CHEST: Pulmonary arteries are normal caliber and negative for pulmonary emboli. Thoracic aorta is negative for dissection. No CT evidence of right heart strain.    LUNGS AND PLEURA: Moderate bilateral layering pleural effusions. Increased interstitial markings, particularly at the lung apices likely  reflecting underlying interstitial pulmonary edema. Mild perihilar groundglass opacity also likely reflecting   interstitial pulmonary edema. Bilateral dependent atelectasis.    MEDIASTINUM/AXILLAE: Mildly enlarged heart. No pericardial effusion. No hilar or mediastinal lymphadenopathy.    CORONARY ARTERY CALCIFICATION: Moderate with a stent.    UPPER ABDOMEN: Cholelithiasis.    MUSCULOSKELETAL: Mild multilevel discogenic degenerative change.      Impression    IMPRESSION:  1.  No pulmonary embolus, aortic dissection, or aneurysm.  2.  Mildly enlarged heart with coronary artery disease and pulmonary edema.  3.  Moderate bilateral layering pleural effusions with associated compressive atelectasis.  4.  Cholelithiasis.

## 2023-12-26 ENCOUNTER — PATIENT OUTREACH (OUTPATIENT)
Dept: CARE COORDINATION | Facility: CLINIC | Age: 73
End: 2023-12-26
Payer: MEDICARE

## 2023-12-26 LAB
ALBUMIN SERPL BCG-MCNC: 3.2 G/DL (ref 3.5–5.2)
ANION GAP SERPL CALCULATED.3IONS-SCNC: 13 MMOL/L (ref 7–15)
BASOPHILS # BLD AUTO: 0.1 10E3/UL (ref 0–0.2)
BASOPHILS NFR BLD AUTO: 1 %
BUN SERPL-MCNC: 10.1 MG/DL (ref 8–23)
CALCIUM SERPL-MCNC: 8.3 MG/DL (ref 8.8–10.2)
CHLORIDE SERPL-SCNC: 96 MMOL/L (ref 98–107)
CREAT SERPL-MCNC: 0.92 MG/DL (ref 0.67–1.17)
DEPRECATED HCO3 PLAS-SCNC: 23 MMOL/L (ref 22–29)
EGFRCR SERPLBLD CKD-EPI 2021: 88 ML/MIN/1.73M2
EOSINOPHIL # BLD AUTO: 0.2 10E3/UL (ref 0–0.7)
EOSINOPHIL NFR BLD AUTO: 4 %
ERYTHROCYTE [DISTWIDTH] IN BLOOD BY AUTOMATED COUNT: 13.3 % (ref 10–15)
FOLATE SERPL-MCNC: 15.2 NG/ML (ref 4.6–34.8)
GLUCOSE SERPL-MCNC: 76 MG/DL (ref 70–99)
HCT VFR BLD AUTO: 34.9 % (ref 40–53)
HGB BLD-MCNC: 11.8 G/DL (ref 13.3–17.7)
IMM GRANULOCYTES # BLD: 0 10E3/UL
IMM GRANULOCYTES NFR BLD: 0 %
LYMPHOCYTES # BLD AUTO: 0.9 10E3/UL (ref 0.8–5.3)
LYMPHOCYTES NFR BLD AUTO: 18 %
MCH RBC QN AUTO: 31.3 PG (ref 26.5–33)
MCHC RBC AUTO-ENTMCNC: 33.8 G/DL (ref 31.5–36.5)
MCV RBC AUTO: 93 FL (ref 78–100)
MONOCYTES # BLD AUTO: 0.5 10E3/UL (ref 0–1.3)
MONOCYTES NFR BLD AUTO: 10 %
NEUTROPHILS # BLD AUTO: 3.3 10E3/UL (ref 1.6–8.3)
NEUTROPHILS NFR BLD AUTO: 67 %
NRBC # BLD AUTO: 0 10E3/UL
NRBC BLD AUTO-RTO: 0 /100
PHOSPHATE SERPL-MCNC: 3.8 MG/DL (ref 2.5–4.5)
PLATELET # BLD AUTO: 265 10E3/UL (ref 150–450)
POTASSIUM SERPL-SCNC: 3.2 MMOL/L (ref 3.4–5.3)
POTASSIUM SERPL-SCNC: 3.5 MMOL/L (ref 3.4–5.3)
RBC # BLD AUTO: 3.77 10E6/UL (ref 4.4–5.9)
SODIUM SERPL-SCNC: 132 MMOL/L (ref 135–145)
WBC # BLD AUTO: 5 10E3/UL (ref 4–11)

## 2023-12-26 PROCEDURE — 99233 SBSQ HOSP IP/OBS HIGH 50: CPT | Performed by: STUDENT IN AN ORGANIZED HEALTH CARE EDUCATION/TRAINING PROGRAM

## 2023-12-26 PROCEDURE — 999N000111 HC STATISTIC OT IP EVAL DEFER

## 2023-12-26 PROCEDURE — 120N000001 HC R&B MED SURG/OB

## 2023-12-26 PROCEDURE — 80069 RENAL FUNCTION PANEL: CPT | Performed by: STUDENT IN AN ORGANIZED HEALTH CARE EDUCATION/TRAINING PROGRAM

## 2023-12-26 PROCEDURE — 84132 ASSAY OF SERUM POTASSIUM: CPT | Performed by: STUDENT IN AN ORGANIZED HEALTH CARE EDUCATION/TRAINING PROGRAM

## 2023-12-26 PROCEDURE — 82746 ASSAY OF FOLIC ACID SERUM: CPT | Performed by: STUDENT IN AN ORGANIZED HEALTH CARE EDUCATION/TRAINING PROGRAM

## 2023-12-26 PROCEDURE — G0378 HOSPITAL OBSERVATION PER HR: HCPCS

## 2023-12-26 PROCEDURE — 250N000013 HC RX MED GY IP 250 OP 250 PS 637: Performed by: STUDENT IN AN ORGANIZED HEALTH CARE EDUCATION/TRAINING PROGRAM

## 2023-12-26 PROCEDURE — 250N000011 HC RX IP 250 OP 636: Performed by: STUDENT IN AN ORGANIZED HEALTH CARE EDUCATION/TRAINING PROGRAM

## 2023-12-26 PROCEDURE — 36415 COLL VENOUS BLD VENIPUNCTURE: CPT | Performed by: STUDENT IN AN ORGANIZED HEALTH CARE EDUCATION/TRAINING PROGRAM

## 2023-12-26 PROCEDURE — 85025 COMPLETE CBC W/AUTO DIFF WBC: CPT | Performed by: STUDENT IN AN ORGANIZED HEALTH CARE EDUCATION/TRAINING PROGRAM

## 2023-12-26 RX ORDER — HYDROXYZINE HYDROCHLORIDE 50 MG/1
50 TABLET, FILM COATED ORAL EVERY 6 HOURS PRN
Status: DISCONTINUED | OUTPATIENT
Start: 2023-12-26 | End: 2023-12-28 | Stop reason: HOSPADM

## 2023-12-26 RX ORDER — HYDROXYZINE HYDROCHLORIDE 25 MG/1
25 TABLET, FILM COATED ORAL EVERY 6 HOURS PRN
Status: DISCONTINUED | OUTPATIENT
Start: 2023-12-26 | End: 2023-12-28 | Stop reason: HOSPADM

## 2023-12-26 RX ORDER — FUROSEMIDE 10 MG/ML
40 INJECTION INTRAMUSCULAR; INTRAVENOUS ONCE
Status: COMPLETED | OUTPATIENT
Start: 2023-12-26 | End: 2023-12-26

## 2023-12-26 RX ORDER — POTASSIUM CHLORIDE 1500 MG/1
40 TABLET, EXTENDED RELEASE ORAL ONCE
Status: COMPLETED | OUTPATIENT
Start: 2023-12-26 | End: 2023-12-26

## 2023-12-26 RX ADMIN — METOPROLOL SUCCINATE 12.5 MG: 25 TABLET, EXTENDED RELEASE ORAL at 08:43

## 2023-12-26 RX ADMIN — HYDROXYZINE HYDROCHLORIDE 25 MG: 25 TABLET, FILM COATED ORAL at 22:25

## 2023-12-26 RX ADMIN — LOSARTAN POTASSIUM 12.5 MG: 25 TABLET, FILM COATED ORAL at 08:43

## 2023-12-26 RX ADMIN — Medication 10 MG: at 22:25

## 2023-12-26 RX ADMIN — FUROSEMIDE 40 MG: 10 INJECTION, SOLUTION INTRAMUSCULAR; INTRAVENOUS at 11:46

## 2023-12-26 RX ADMIN — POTASSIUM CHLORIDE 40 MEQ: 1500 TABLET, EXTENDED RELEASE ORAL at 12:30

## 2023-12-26 RX ADMIN — CLOPIDOGREL BISULFATE 75 MG: 75 TABLET ORAL at 08:43

## 2023-12-26 RX ADMIN — APIXABAN 5 MG: 5 TABLET, FILM COATED ORAL at 08:43

## 2023-12-26 RX ADMIN — ATORVASTATIN CALCIUM 80 MG: 80 TABLET, FILM COATED ORAL at 08:42

## 2023-12-26 RX ADMIN — AMIODARONE HYDROCHLORIDE 200 MG: 200 TABLET ORAL at 08:43

## 2023-12-26 ASSESSMENT — ACTIVITIES OF DAILY LIVING (ADL)
DEPENDENT_IADLS:: INDEPENDENT
ADLS_ACUITY_SCORE: 20

## 2023-12-26 NOTE — CONSULTS
Care Management Initial Consult    General Information  Assessment completed with: VM-chart review, Patient,  Spouse Melissa     Primary Care Provider verified and updated as needed:     Readmission within the last 30 days: previous discharge plan unsuccessful     11/12/2023 - 11/15/2023 (3 days)  [facility]       Reason for Consult: discharge planning    Advance Care Planning: Advance Care Planning Reviewed: no concerns identified        Communication Assessment  Patient's communication style: spoken language (English or Bilingual)    Hearing Difficulty or Deaf: no   Wear Glasses or Blind: yes    Cognitive  Cognitive/Neuro/Behavioral: WDL                      Living Environment:   People in home: spouse     Current living Arrangements: house      Able to return to prior arrangements: yes       Family/Social Support:  Care provided by: self  Provides care for: no one  Marital Status:              Description of Support System:       Supportive and involved in care needs    Current Resources:   Patient receiving home care services: Yes  Skilled Home Care Services: Skilled Nursing, Physical Therapy  Community Resources: Home Care  Equipment currently used at home: O2  Supplies currently used at home: None    Employment/Financial:  Employment Status: retired        Financial Concerns: none      Does the patient's insurance plan have a 3 day qualifying hospital stay waiver?  No    Lifestyle & Psychosocial Needs:  Social Determinants of Health     Food Insecurity: Low Risk  (11/23/2023)    Food Insecurity     Within the past 12 months, did you worry that your food would run out before you got money to buy more?: No     Within the past 12 months, did the food you bought just not last and you didn t have money to get more?: No   Depression: Not at risk (11/24/2023)    PHQ-2     PHQ-2 Score: 0   Housing Stability: Low Risk  (11/23/2023)    Housing Stability     Do you have housing? : Yes      Are you worried about losing your housing?: No   Tobacco Use: Medium Risk (12/20/2023)    Patient History     Smoking Tobacco Use: Former     Smokeless Tobacco Use: Never     Passive Exposure: Current   Financial Resource Strain: Low Risk  (11/23/2023)    Financial Resource Strain     Within the past 12 months, have you or your family members you live with been unable to get utilities (heat, electricity) when it was really needed?: No   Alcohol Use: Not on file   Transportation Needs: Low Risk  (11/23/2023)    Transportation Needs     Within the past 12 months, has lack of transportation kept you from medical appointments, getting your medicines, non-medical meetings or appointments, work, or from getting things that you need?: No   Physical Activity: Inactive (11/16/2023)    Exercise Vital Sign     Days of Exercise per Week: 0 days     Minutes of Exercise per Session: 0 min   Interpersonal Safety: Low Risk  (10/9/2023)    Interpersonal Safety     Do you feel physically and emotionally safe where you currently live?: Yes     Within the past 12 months, have you been hit, slapped, kicked or otherwise physically hurt by someone?: No     Within the past 12 months, have you been humiliated or emotionally abused in other ways by your partner or ex-partner?: No   Stress: Not on file   Social Connections: Unknown (11/16/2023)    Social Connection and Isolation Panel [NHANES]     Frequency of Communication with Friends and Family: Not on file     Frequency of Social Gatherings with Friends and Family: Not on file     Attends Pentec[employer]al Services: Not on file     Active Member of Clubs or Organizations: Not on file     Attends Club or Organization Meetings: Not on file     Marital Status:        Functional Status:  Prior to admission patient needed assistance:   Dependent ADLs:: Independent  Dependent IADLs:: Independent       Mental Health Status:  Mental Health Status: No Current Concerns       Chemical Dependency  "Status:  Chemical Dependency Status: No Current Concerns             Values/Beliefs:  Spiritual, Cultural Beliefs, Jainism Practices, Values that affect care: no             Additional Information:  Referral received due to hospital re-admission and current  [facility] client.     Pt is currently open to [facility] for RN and PT services.       Was recently hospitalized at [facility] 11/12/2023 - 11/15/2023 (3 days)  [facility]    CM placed call to the Pt to introduce self/role, complete assessment and offer support as needed. Pt states he feels equipped to return back home when medically stable. Receives support from spouse Melissa and has all needed equipment at home. Pt acknowledged that he currently receives  [facility] but states \"they do need to do much for me.\"     Pt requested to have CM also place call to his spouse Melissa to confirm no concerns/questions.   Call made to wife who voiced no questions at this time.     States the Pt will return home when medically stable and resume services through  [facility] as previous.       PLAN: Return back to home with resumption of [facility]  RN and PT    [facility] referral placed for follow up PCP visit  CM to send Handoff to Clinic Care Coordination    Spouse Melissa to assist with transportation         DMITRY Olmstead  [facility] [phone]   [facility]  [phone]      "

## 2023-12-26 NOTE — CONSULTS
Nutrition Note Brief    RD consulted for Heart failure diet education. Per chart review patient has received multiple educations over the last 2 months. Patient is not a new dx of HF. RD deferred consult at this time. Please re consult if further needs arise.    Alexa Remy RDN, LD  Clinical Dietitian  Office: [phone]  Weekend pager: [phone]\

## 2023-12-26 NOTE — PLAN OF CARE
"  Problem: Adult Inpatient Plan of Care  Goal: Plan of Care Review  Description: The Plan of Care Review/Shift note should be completed every shift.  The Outcome Evaluation is a brief statement about your assessment that the patient is improving, declining, or no change.  This information will be displayed automatically on your shift  note.  Outcome: Progressing  Flowsheets (Taken 12/26/2023 1457)  Outcome Evaluation: Pt continues to be stable on room air. Staff will continue to monitor oxygenation needs and fluid restriction.  Plan of Care Reviewed With: patient  Overall Patient Progress: improving  Goal: Patient-Specific Goal (Individualized)  Description: You can add care plan individualizations to a care plan. Examples of Individualization might be:  \"Parent requests to be called daily at 9am for status\", \"I have a hard time hearing out of my right ear\", or \"Do not touch me to wake me up as it startles  me\".  Outcome: Progressing  Goal: Absence of Hospital-Acquired Illness or Injury  Outcome: Progressing  Intervention: Identify and Manage Fall Risk  Flowsheets  Taken 12/26/2023 1457  Safety Promotion/Fall Prevention:   clutter free environment maintained   lighting adjusted   nonskid shoes/slippers when out of bed   patient and family education  Taken 12/26/2023 0845  Safety Promotion/Fall Prevention:   clutter free environment maintained   lighting adjusted   nonskid shoes/slippers when out of bed   patient and family education  Intervention: Prevent Skin Injury  Flowsheets (Taken 12/26/2023 1457)  Body Position: position changed independently  Intervention: Prevent and Manage VTE (Venous Thromboembolism) Risk  Flowsheets (Taken 12/26/2023 1457)  VTE Prevention/Management: (Ambulating independently)   SCDs (sequential compression devices) off   other (see comments)  Intervention: Prevent Infection  Flowsheets (Taken 12/26/2023 1457)  Infection Prevention:   hand hygiene promoted   rest/sleep promoted  Goal: " Optimal Comfort and Wellbeing  Outcome: Progressing  Intervention: Monitor Pain and Promote Comfort  Flowsheets (Taken 12/26/2023 5897)  Pain Management Interventions: care clustered  Intervention: Provide Person-Centered Care  Flowsheets (Taken 12/26/2023 7447)  Trust Relationship/Rapport:   care explained   choices provided  Goal: Readiness for Transition of Care  Outcome: Progressing   Goal Outcome Evaluation:      Plan of Care Reviewed With: patient    Overall Patient Progress: improving    Outcome Evaluation: Pt continues to be stable on room air. Staff will continue to monitor oxygenation needs and fluid restriction.

## 2023-12-26 NOTE — PROGRESS NOTES
[facility]    Medicine Progress Note - Hospitalist Service    Date of Admission:  12/25/2023    Assessment & Plan      Duane C Johnson is a 73 year old male admitted on 12/25/2023. He Has past medical history significant for recent NSTEMI status post BRENDA on dual therapy (Eliquis plus Plavix) was complicated by cardiac arrest.  He presents with shortness of breath onset since October when he had his coronary event and has progressively gotten worse. His initial dyspnea was resolved secondary to Brillinta usage, but has recurred since with persistent bilateral pleural effusions and HFrEF. This is the patient's 3rd ER visit for similar complaints. Initial workup revealed pleural effusions patient was admitted for diuresis for presumable acute decompensated heart failure.     SOB  Orthopnea/PND  Bilateral Pleural Effusions  Acute Decompensated Heart Failure  Shortness of breath likely related t/o acute decompensated heart failure with pulmonary edema and bilateral pleural effusions although due to patient habitus does not look grossly volume overload. Patient endorses shortness of breath that he thinks is related to anxiety and states it only occurs at night when he lays flat. CTPE: moderate bilateral layering pleural effusions. Interstitial pulmonary edema. (Effusions present in 11/2023 after AMI but on 03/15/23 before AMI had CT without effusions and small nodules in lungs) so have been chronic despite diuretic therapy. Diuretics were stopped shortly after discharging from the hospital 11/22/23. He has not resumed diuretics and has improved BNP on admission (8310 -> 7970). Exam: Bibasilar Rales and diminished breath sounds bilaterally, no appreciable JVD (1 cm above clavicle with hepatojugular reflux), no lower extremity edema or abdominal distention. ER Course: Gave 1 dose of 40mg IV lasix with patient reporting 3 episodes of urination. EKG: sinus bradycardia. No acute ischemic changes and  unchanged from prior grossly.  - Will plan for thoracentesis, given apixaban is prescribed due to LV thrombus/BRENDA (DAPT alternative) prevention and previously did not have one present will hold given risk vs benefit of procedural bleeding/complication. Will require 2 doses to be held prior to thoracentesis so planning on 12/27. Will likely require additional thoracentesis of contralateral side in the future.   - Cardiology Heart Failure focused order set utilized  - Give single repeat dose of 40 mg IV Lasix redose   - Continue GDMT: ARB, beta-blocker with hold parameters    CAD  History of AMI status post BRENDA (10/2023)  History of cardiac arrest (VT/VF arrest around time of heart attack)  [facility] (10/26/2023): BRENDA of proximal to mid LAD.  100% distal LAD lesion, 80% first diagonal, 70% RPDA, 40% distal RCA  TTE: LVEF 30%  [facility] (11/3/2023): Enlarged LV with wall thinning and akinesis (LVEF 28%).  LGE in LAD territory consistent with large infarction.  Bilateral pleural effusions.  Consistent with ischemic cardiomyopathy and severely reduced LVEF.  Home meds: Plavix and Eliquis, Lipitor 80 mg, metoprolol 12.5 mg daily (Toprol-XL), Amiodarone    Patient on Eliquis due to large area of akinesis in the LAD territory (presumably to prevent LV thrombus formation)  - Continue Plavix and statin  - Holding eliquis per above  - Continue beta-blocker with hold parameters and continue amiodarone    Pelvic LAD  Prostate Cancer  Subjective left inguinal enlarged lymph node patient noticed today (I am unable to feel anything on exam)  Endorses prostate cancer diagnosis that was going to be surgically treated but the day before surgery was when patient had AMI  - OP urology follow up recommended  - Monitor of LAD    Generalized anxiety disorder  Previously started on escitalopram 10 mg during previous hospitalization for MAULIK. He also was taking trazodone for insomnia and hydroxyzine for PRN anxiety. On admission he reportedly stopped  both trazodone and hydroxyzine due to ineffectiveness.    - Continue Lexapro 10 mg daily and hydroxyzine PRN  - Melatonin 10 mg nightly for insomnia          Diet: Combination Diet 2 gm NA Diet; No Caffeine Diet (and additional linked orders)  Fluid restriction 2000 ML FLUID (and additional linked orders)    DVT Prophylaxis: DOAC  Michael Catheter: Not present  Lines: None     Cardiac Monitoring: ACTIVE order. Indication: Acute decompensated heart failure (48 hours)  Code Status: Full Code      Clinically Significant Risk Factors Present on Admission        # Hypokalemia: Lowest K = 3.2 mmol/L in last 2 days, will replace as needed   # Hypocalcemia: Lowest Ca = 8.3 mg/dL in last 2 days, will monitor and replace as appropriate     # Hypoalbuminemia: Lowest albumin = 3.2 g/dL at 12/26/2023  5:18 AM, will monitor as appropriate  # Drug Induced Coagulation Defect: home medication list includes an anticoagulant medication  # Drug Induced Platelet Defect: home medication list includes an antiplatelet medication   # Hypertension: Noted on problem list  # Acute heart failure with reduced ejection fraction: last echo with EF <40% and receiving IV diuretics         # Financial/Environmental Concerns: none         Disposition Plan      Expected Discharge Date: 12/27/2023      Destination: home with help/services              Vincent Mcbride DO  Hospitalist Service  [facility]  Securely message with [employer] (more info)  Text page via [employer] Paging/Directory   ______________________________________________________________________    Interval History   No acute events overnight. Patient report restful sleep and improved dyspnea. He was sleeping at an incline and thinks some benefit was partially from the angle. Discussed thoracentesis and logistics of anticoagulation with procedure.     Physical Exam   Vital Signs: Temp: 97.6  F (36.4  C) Temp src: Oral BP: 93/57 Pulse: 54   Resp: 18 SpO2: 93 % O2 Device:  None (Room air)    Weight: 134 lbs 11.22 oz    Constitutional: awake, alert, cooperative, no apparent distress, and appears stated age  Respiratory: No increased work of breathing, diminished breath sounds bibasilar, no crackles or wheezing  Cardiovascular: Normal apical impulse, regular rate and rhythm, normal S1 and S2, no S3 or S4, and no murmur noted  GI: No scars, normal bowel sounds, soft, non-distended, non-tender, no masses palpated, no hepatosplenomegally  Skin: normal skin color, texture, turgor  Musculoskeletal: There is no redness, warmth, or swelling of the joints.  Full range of motion noted.  Motor strength is 5 out of 5 all extremities bilaterally.  Tone is normal.    Medical Decision Making       50 MINUTES SPENT BY ME on the date of service doing chart review, history, exam, documentation & further activities per the note.      Data     I have personally reviewed the following data over the past 24 hrs:    5.0  \   11.8 (L)   / 265     132 (L) 96 (L) 10.1 /  76   3.5 23 0.92 \     ALT: N/A AST: N/A AP: N/A TBILI: N/A   ALB: 3.2 (L) TOT PROTEIN: N/A LIPASE: N/A       Imaging results reviewed over the past 24 hrs:   No results found for this or any previous visit (from the past 24 hour(s)).

## 2023-12-26 NOTE — PLAN OF CARE
OT: CHF orders received. CHF is not a new diagnosis for patient and worked with OT on 11/2023 for CHF protocol. Based on chart review no new IP OT needs identified; MD in agreement. Will discontinue IP OT orders at this time.

## 2023-12-26 NOTE — PROGRESS NOTES
[facility]  Patient is currently open to home care services with [facility]. The patient has  RN PT services.  [facility]  and team have been notified of patient admission.  [facility] liaison will continue to follow patient during stay.  If appropriate provide orders to resume home care at time of discharge.

## 2023-12-26 NOTE — PLAN OF CARE
Problem: Adult Inpatient Plan of Care  Goal: Plan of Care Review  Description: The Plan of Care Review/Shift note should be completed every shift.  The Outcome Evaluation is a brief statement about your assessment that the patient is improving, declining, or no change.  This information will be displayed automatically on your shift  note.  Flowsheets (Taken 12/26/2023 0604)  Outcome Evaluation: Patient alert and oriented, able to make needs known. Independent in room, Dyspnea on exertion.is concerned with night terrors that he has been having since his MI around october.  Following fluid restiction of 2000cc.  Fluid yesterday was 1900cc and so far overnight he has had 150cc in  Plan of Care Reviewed With: patient  Overall Patient Progress: improving     Problem: Adult Inpatient Plan of Care  Goal: Absence of Hospital-Acquired Illness or Injury  Intervention: Identify and Manage Fall Risk  Recent Flowsheet Documentation  Taken 12/26/2023 0003 by Holly Geller RN  Safety Promotion/Fall Prevention:   assistive device/personal items within reach   clutter free environment maintained   increased rounding and observation   increase visualization of patient   lighting adjusted   mobility aid in reach   nonskid shoes/slippers when out of bed   room door open   room near nurse's station   safety round/check completed     Problem: Heart Failure  Goal: Fluid and Electrolyte Balance  Intervention: Monitor and Manage Fluid and Electrolyte Balance  Recent Flowsheet Documentation  Taken 12/26/2023 0003 by Holly Geller RN  Fluid/Electrolyte Management: fluids restricted     Problem: Risk for Delirium  Goal: Improved Behavioral Control  Intervention: Minimize Safety Risk  Recent Flowsheet Documentation  Taken 12/26/2023 0003 by Holly Geller, RN  Enhanced Safety Measures: room near unit station     Problem: Heart Failure  Goal: Effective Oxygenation and Ventilation  Intervention: Promote Airway Secretion  Clearance  Recent Flowsheet Documentation  Taken 12/26/2023 0003 by Holly Geller, RN  Cough And Deep Breathing: done independently per patient   Goal Outcome Evaluation:      Plan of Care Reviewed With: patient    Overall Patient Progress: improvingOverall Patient Progress: improving    Outcome Evaluation: Patient alert and oriented, able to make needs known. Independent in room, Dyspnea on exertion.is concerned with night terrors that he has been having since his MI around october.  Following fluid restiction of 2000cc.  Fluid yesterday was 1900cc and so far overnight he has had 150cc in

## 2023-12-26 NOTE — PROGRESS NOTES
Clinic Care Coordination Contact  Ambulatory Care Coordination to Inpatient Care Management   Hand-In Communication    Date:  December 26, 2023  Name: Duane C Johnson is enrolled in Ambulatory Care Coordination program and I am the Lead Care Coordinator.  CC Contact Information: Epic InBasket + phone  Payor Source: Payor: MEDICARE / Plan: MEDICARE / Product Type: Medicare /   Current services in place:     Please see the CC Snaphot and Care Management Flowsheets for specific  details of this Duane C Johnson care plan.   Additional details/specific concerns r/t this admission:    Goals of Care Demonstrate improved heart failure management  Barriers: Deconditioned   Strengths: supportive wife   Patient expressed understanding of goal: Yes   Action steps to achieve this goal:  1. I will check my weight daily and call if weight gain is 2-3# in a day or 5# in a week,increased shortness of breath episodes or leg swelling   2. I will take my medications as directed and keep future provider appointments   3. I will keep future Cardiac Rehab appointments   4. I will continue home care services      I will follow this admission in Epic. Please feel free to contact me with questions or for further collaboration in discharge planning.   [facility]   Ruthy Paiz RN, Care Coordinator   [facility]   E-mail [email or web address]   [phone]

## 2023-12-27 ENCOUNTER — APPOINTMENT (OUTPATIENT)
Dept: ULTRASOUND IMAGING | Facility: CLINIC | Age: 73
DRG: 291 | End: 2023-12-27
Attending: STUDENT IN AN ORGANIZED HEALTH CARE EDUCATION/TRAINING PROGRAM
Payer: MEDICARE

## 2023-12-27 LAB
ANION GAP SERPL CALCULATED.3IONS-SCNC: 13 MMOL/L (ref 7–15)
APPEARANCE FLD: CLEAR
BUN SERPL-MCNC: 10.4 MG/DL (ref 8–23)
CALCIUM SERPL-MCNC: 8.9 MG/DL (ref 8.8–10.2)
CELL COUNT BODY FLUID SOURCE: NORMAL
CHLORIDE SERPL-SCNC: 95 MMOL/L (ref 98–107)
COLOR FLD: YELLOW
CREAT SERPL-MCNC: 0.91 MG/DL (ref 0.67–1.17)
DEPRECATED HCO3 PLAS-SCNC: 23 MMOL/L (ref 22–29)
EGFRCR SERPLBLD CKD-EPI 2021: 89 ML/MIN/1.73M2
ERYTHROCYTE [DISTWIDTH] IN BLOOD BY AUTOMATED COUNT: 13.2 % (ref 10–15)
GLUCOSE BODY FLUID SOURCE: NORMAL
GLUCOSE FLD-MCNC: 107 MG/DL
GLUCOSE SERPL-MCNC: 93 MG/DL (ref 70–99)
HCT VFR BLD AUTO: 34.6 % (ref 40–53)
HGB BLD-MCNC: 11.8 G/DL (ref 13.3–17.7)
INR PPP: 1.44 (ref 0.85–1.15)
LD BODY BODY FLUID SOURCE: NORMAL
LDH FLD L TO P-CCNC: 84 U/L
LDH SERPL L TO P-CCNC: 212 U/L (ref 0–250)
MAGNESIUM SERPL-MCNC: 1.6 MG/DL (ref 1.7–2.3)
MCH RBC QN AUTO: 31.6 PG (ref 26.5–33)
MCHC RBC AUTO-ENTMCNC: 34.1 G/DL (ref 31.5–36.5)
MCV RBC AUTO: 93 FL (ref 78–100)
PLATELET # BLD AUTO: 264 10E3/UL (ref 150–450)
POTASSIUM SERPL-SCNC: 3.9 MMOL/L (ref 3.4–5.3)
PROT FLD-MCNC: 2.7 G/DL
PROT SERPL-MCNC: 6.5 G/DL (ref 6.4–8.3)
PROTEIN BODY FLUID SOURCE: NORMAL
RBC # BLD AUTO: 3.74 10E6/UL (ref 4.4–5.9)
SODIUM SERPL-SCNC: 131 MMOL/L (ref 135–145)
WBC # BLD AUTO: 6.2 10E3/UL (ref 4–11)
WBC COUNT FROM INSTRUMENT (BODY FLUID): 575 /UL

## 2023-12-27 PROCEDURE — 120N000001 HC R&B MED SURG/OB

## 2023-12-27 PROCEDURE — 83615 LACTATE (LD) (LDH) ENZYME: CPT | Performed by: STUDENT IN AN ORGANIZED HEALTH CARE EDUCATION/TRAINING PROGRAM

## 2023-12-27 PROCEDURE — 250N000013 HC RX MED GY IP 250 OP 250 PS 637: Performed by: STUDENT IN AN ORGANIZED HEALTH CARE EDUCATION/TRAINING PROGRAM

## 2023-12-27 PROCEDURE — 32555 ASPIRATE PLEURA W/ IMAGING: CPT

## 2023-12-27 PROCEDURE — 250N000009 HC RX 250: Performed by: RADIOLOGY

## 2023-12-27 PROCEDURE — 36415 COLL VENOUS BLD VENIPUNCTURE: CPT | Performed by: STUDENT IN AN ORGANIZED HEALTH CARE EDUCATION/TRAINING PROGRAM

## 2023-12-27 PROCEDURE — 85610 PROTHROMBIN TIME: CPT | Performed by: STUDENT IN AN ORGANIZED HEALTH CARE EDUCATION/TRAINING PROGRAM

## 2023-12-27 PROCEDURE — 84155 ASSAY OF PROTEIN SERUM: CPT | Performed by: STUDENT IN AN ORGANIZED HEALTH CARE EDUCATION/TRAINING PROGRAM

## 2023-12-27 PROCEDURE — 89050 BODY FLUID CELL COUNT: CPT | Performed by: STUDENT IN AN ORGANIZED HEALTH CARE EDUCATION/TRAINING PROGRAM

## 2023-12-27 PROCEDURE — 84157 ASSAY OF PROTEIN OTHER: CPT | Performed by: STUDENT IN AN ORGANIZED HEALTH CARE EDUCATION/TRAINING PROGRAM

## 2023-12-27 PROCEDURE — 83735 ASSAY OF MAGNESIUM: CPT | Performed by: STUDENT IN AN ORGANIZED HEALTH CARE EDUCATION/TRAINING PROGRAM

## 2023-12-27 PROCEDURE — 0W993ZZ DRAINAGE OF RIGHT PLEURAL CAVITY, PERCUTANEOUS APPROACH: ICD-10-PCS | Performed by: RADIOLOGY

## 2023-12-27 PROCEDURE — 85027 COMPLETE CBC AUTOMATED: CPT | Performed by: STUDENT IN AN ORGANIZED HEALTH CARE EDUCATION/TRAINING PROGRAM

## 2023-12-27 PROCEDURE — 99233 SBSQ HOSP IP/OBS HIGH 50: CPT | Performed by: STUDENT IN AN ORGANIZED HEALTH CARE EDUCATION/TRAINING PROGRAM

## 2023-12-27 PROCEDURE — 80048 BASIC METABOLIC PNL TOTAL CA: CPT | Performed by: STUDENT IN AN ORGANIZED HEALTH CARE EDUCATION/TRAINING PROGRAM

## 2023-12-27 PROCEDURE — 82945 GLUCOSE OTHER FLUID: CPT | Performed by: STUDENT IN AN ORGANIZED HEALTH CARE EDUCATION/TRAINING PROGRAM

## 2023-12-27 RX ORDER — LIDOCAINE 40 MG/G
CREAM TOPICAL
Status: DISCONTINUED | OUTPATIENT
Start: 2023-12-27 | End: 2023-12-28 | Stop reason: HOSPADM

## 2023-12-27 RX ADMIN — AMIODARONE HYDROCHLORIDE 200 MG: 200 TABLET ORAL at 08:24

## 2023-12-27 RX ADMIN — LIDOCAINE HYDROCHLORIDE 7.5 ML: 10 INJECTION, SOLUTION EPIDURAL; INFILTRATION; INTRACAUDAL; PERINEURAL at 14:58

## 2023-12-27 RX ADMIN — LOSARTAN POTASSIUM 12.5 MG: 25 TABLET, FILM COATED ORAL at 08:27

## 2023-12-27 RX ADMIN — CLOPIDOGREL BISULFATE 75 MG: 75 TABLET ORAL at 08:26

## 2023-12-27 RX ADMIN — METOPROLOL SUCCINATE 12.5 MG: 25 TABLET, EXTENDED RELEASE ORAL at 08:24

## 2023-12-27 RX ADMIN — ATORVASTATIN CALCIUM 80 MG: 80 TABLET, FILM COATED ORAL at 08:24

## 2023-12-27 RX ADMIN — HYDROXYZINE HYDROCHLORIDE 25 MG: 25 TABLET, FILM COATED ORAL at 22:31

## 2023-12-27 RX ADMIN — ESCITALOPRAM OXALATE 10 MG: 10 TABLET ORAL at 08:26

## 2023-12-27 RX ADMIN — Medication 10 MG: at 22:31

## 2023-12-27 ASSESSMENT — ACTIVITIES OF DAILY LIVING (ADL)
ADLS_ACUITY_SCORE: 20

## 2023-12-27 NOTE — PROGRESS NOTES
Right sided thoracentesis performed by radiologist. 1200 ml of clear yellow fluid removed. Pressure held at site after catheter removed.  Band aid applied. No bleeding noted. Band aide can be removed after 24 hours. The pts BP was a bit low before and after the procedure. The pt states that it is his normal BP and denies any complaints of lightheadedness.     The pt was returned to M/S room 2308 via wheel chair. Report was given to the floor RN.

## 2023-12-27 NOTE — PROGRESS NOTES
PRIMARY DIAGNOSIS: CONGESTIVE HEART FAILURE  OUTPATIENT/OBSERVATION GOALS TO BE MET BEFORE DISCHARGE:  Dyspnea improved and O2 sats >88% at RA or at prior home O2 therapy level: Yes        SpO2: 93 %, O2 Device: None (Room air)  Vitals:    12/25/23 0734 12/25/23 1554   Weight: 62.6 kg (138 lb) 61.1 kg (134 lb 11.2 oz)        ECHO and other diagnostic testing complete (if applicable):  tests and procedures in process    Return to near baseline physical activity: Yes    Discharge Planner Nurse   Safe discharge environment identified: Yes  Barriers to discharge: Yes. Pending procedures and test completion.       Entered by: Milena Gutierrez RN 12/27/2023 5:29 AM

## 2023-12-27 NOTE — PLAN OF CARE
Goal Outcome Evaluation:      Plan of Care Reviewed With: patient    Overall Patient Progress: improvingOverall Patient Progress: improving.  Patient SpO2 >90% on room air.  Patient tolerates ambulating around med/surg unit.

## 2023-12-27 NOTE — PROGRESS NOTES
[facility]    Medicine Progress Note - Hospitalist Service    Date of Admission:  12/25/2023    Assessment & Plan      Duane C Johnson is a 73 year old male admitted on 12/25/2023. He Has past medical history significant for recent NSTEMI status post BRENDA on dual therapy (Eliquis plus Plavix) was complicated by cardiac arrest.  He presents with shortness of breath onset since October when he had his coronary event and has progressively gotten worse. His initial dyspnea was resolved secondary to Brillinta usage, but has recurred since with persistent bilateral pleural effusions and HFrEF. This is the patient's 3rd ER visit for similar complaints. Initial workup revealed pleural effusions patient was admitted for diuresis for presumable acute decompensated heart failure.     SOB  Orthopnea/PND  Bilateral Pleural Effusions  Acute Decompensated Heart Failure  Shortness of breath likely related t/o acute decompensated heart failure with pulmonary edema and bilateral pleural effusions although due to patient habitus does not look grossly volume overload. Patient endorses shortness of breath that he thinks is related to anxiety and states it only occurs at night when he lays flat. CTPE: moderate bilateral layering pleural effusions. Interstitial pulmonary edema. (Effusions present in 11/2023 after AMI but on 03/15/23 before AMI had CT without effusions and small nodules in lungs) so have been chronic despite diuretic therapy. Diuretics were stopped shortly after discharging from the hospital 11/22/23. He has not resumed diuretics and has improved BNP on admission (8310 -> 7970). Exam: Bibasilar Rales and diminished breath sounds bilaterally, no appreciable JVD (1 cm above clavicle with hepatojugular reflux), no lower extremity edema or abdominal distention. ER Course: Gave 1 dose of 40mg IV lasix with patient reporting 3 episodes of urination. EKG: sinus bradycardia. No acute ischemic changes and  unchanged from prior grossly.  - Will plan for thoracentesis, given apixaban is prescribed due to LV thrombus/BRENDA (DAPT alternative) prevention and previously did not have one present will hold given risk vs benefit of procedural bleeding/complication. Will require 2 doses to be held prior to thoracentesis so planning on 12/27. Will likely require additional thoracentesis of contralateral side in the future after discharge.   - Cardiology Heart Failure focused order set utilized  - Recommended obtaining wedge pillow at home to place patient about 30 degrees for symptom relief  - US Thoracentesis today, studies pending  - Holding lasix dose given thora  - Planning for contralateral thoracentesis in the morning since his anticoagulation has been stopped and will likely monitor patient following the procedure for a complications and likely discharge home with close follow-up  - Continue GDMT: ARB, beta-blocker with hold parameters    CAD  History of AMI status post BRENDA (10/2023)  History of cardiac arrest (VT/VF arrest around time of heart attack)  [facility] (10/26/2023): BRENDA of proximal to mid LAD.  100% distal LAD lesion, 80% first diagonal, 70% RPDA, 40% distal RCA  TTE: LVEF 30%  [facility] (11/3/2023): Enlarged LV with wall thinning and akinesis (LVEF 28%).  LGE in LAD territory consistent with large infarction.  Bilateral pleural effusions.  Consistent with ischemic cardiomyopathy and severely reduced LVEF.  Home meds: Plavix and Eliquis, Lipitor 80 mg, metoprolol 12.5 mg daily (Toprol-XL), Amiodarone    Patient on Eliquis due to large area of akinesis in the LAD territory (presumably to prevent LV thrombus formation)  - Continue Plavix and statin  - Holding eliquis per above  - Continue beta-blocker with hold parameters and continue amiodarone    Pelvic LAD  Prostate Cancer  Subjective left inguinal enlarged lymph node patient noticed today (I am unable to feel anything on exam)  Endorses prostate cancer diagnosis that was  going to be surgically treated but the day before surgery was when patient had AMI  - OP urology follow up recommended  - Monitor of LAD    Generalized anxiety disorder  Previously started on escitalopram 10 mg during previous hospitalization for MAULIK. He also was taking trazodone for insomnia and hydroxyzine for PRN anxiety. On admission he reportedly stopped both trazodone and hydroxyzine due to ineffectiveness.    - Continue Lexapro 10 mg daily and hydroxyzine PRN  - Melatonin 10 mg nightly for insomnia    Hypomagnesemia  Mg 1.6, likely from diuretics.  - RN replacement protocol ordered    Hyponatremia  Likely hypervolemic hyponatremia secondary to congestive heart failure and current diuretic usage.   - Will check urine chloride for assessment of dehydration since patient has been off diuretics today.  Holding diuretics and additional IVF since patient has adequate oral intake.  - Repeat BMP a.m.          Diet: Combination Diet 2 gm NA Diet; No Caffeine Diet (and additional linked orders)  Fluid restriction 2000 ML FLUID (and additional linked orders)    DVT Prophylaxis: DOAC  Michael Catheter: Not present  Lines: None     Cardiac Monitoring: ACTIVE order. Indication: Acute decompensated heart failure (48 hours)  Code Status: Full Code      Clinically Significant Risk Factors Present on Admission        # Hypokalemia: Lowest K = 3.2 mmol/L in last 2 days, will replace as needed   # Hypocalcemia: Lowest Ca = 8.3 mg/dL in last 2 days, will monitor and replace as appropriate   # Hypomagnesemia: Lowest Mg = 1.6 mg/dL in last 2 days, will replace as needed   # Hypoalbuminemia: Lowest albumin = 3.2 g/dL at 12/26/2023  5:18 AM, will monitor as appropriate    # Drug Induced Coagulation Defect: home medication list includes an anticoagulant medication  # Drug Induced Platelet Defect: home medication list includes an antiplatelet medication   # Hypertension: Noted on problem list  # Acute heart failure with reduced ejection  fraction: last echo with EF <40% and receiving IV diuretics         # Financial/Environmental Concerns: none         Disposition Plan      Expected Discharge Date: 12/28/2023      Destination: home with help/services              Vincent Mcbride DO  Hospitalist Service  [facility]  Securely message with [employer] (more info)  Text page via [facility] Paging/Directory   ______________________________________________________________________    Interval History   No acute events overnight. Patient reported sleeping well again last night with bed at an incline.  Patient with continued perception of shortness of breath without any recorded hypoxic events lower than 88% SpO2.    Physical Exam   Vital Signs: Temp: 97.4  F (36.3  C) Temp src: Oral BP: 96/61 Pulse: 77   Resp: 15 SpO2: 94 % O2 Device: None (Room air)    Weight: 135 lbs 5.8 oz    Constitutional: awake, alert, cooperative, no apparent distress, and appears stated age  Respiratory: Diminished breath sounds bibasilar, no crackles or wheezing  Cardiovascular: Normal apical impulse, regular rate and rhythm, normal S1 and S2, no S3 or S4, and no murmur noted  GI: No scars, normal bowel sounds, soft, non-distended, non-tender, no masses palpated, no hepatosplenomegally  Skin: normal skin color, texture, turgor    Medical Decision Making       50 MINUTES SPENT BY ME on the date of service doing chart review, history, exam, documentation & further activities per the note.      Data     I have personally reviewed the following data over the past 24 hrs:    6.2  \   11.8 (L)   / 264     131 (L) 95 (L) 10.4 /  93   3.9 23 0.91 \     ALT: N/A AST: N/A AP: N/A TBILI: N/A   ALB: N/A TOT PROTEIN: 6.5 LIPASE: N/A     INR:  1.44 (H) PTT:  N/A   D-dimer:  N/A Fibrinogen:  N/A       Imaging results reviewed over the past 24 hrs:   No results found for this or any previous visit (from the past 24 hour(s)).

## 2023-12-27 NOTE — PLAN OF CARE
Problem: Adult Inpatient Plan of Care  Goal: Optimal Comfort and Wellbeing  Outcome: Progressing  Intervention: Monitor Pain and Promote Comfort  Recent Flowsheet Documentation  Taken 12/26/2023 1700 by Angi Maciel RN  Pain Management Interventions: care clustered  Intervention: Provide Person-Centered Care  Recent Flowsheet Documentation  Taken 12/26/2023 1725 by Angi Maciel, RN  Trust Relationship/Rapport:   care explained   choices provided  Goal: Readiness for Transition of Care  Outcome: Progressing     Problem: Risk for Delirium  Goal: Improved Sleep  Outcome: Progressing   Goal Outcome Evaluation:         Pt A & O x 4, denies pain, on room air, able to make needs known, anxious about being able to sleep, had writer call wife to clarify HS medications. Steady gait, ate 100 % of evening meal, fluid restriction, PIV saline locked, flushed and capped, decline HS cares.

## 2023-12-27 NOTE — UTILIZATION REVIEW
Admission Status; Secondary Review Determination    Under the authority of the Utilization Management Committee, the utilization review process indicated a secondary review on the above patient. The review outcome is based on review of the medical records, discussions with staff, and applying clinical experience noted on the date of the review.    (x) Inpatient Status Appropriate - This patient's medical care is consistent with medical management for inpatient care and reasonable inpatient medical practice.    RATIONALE FOR DETERMINATION: 73-year-old male with significant medical history of CAD including ST segment elevated MI with cardiac arrest September 2023, heart failure, COPD, anxiety who now presents with worsening shortness of breath and orthopnea.  Patient with decreased breath sounds at the bases with bilateral crackles with significant elevated N-terminal proBNP level of 8000 and CT imaging of the chest revealing mildly enlarged heart with pulmonary edema and moderate bilateral layering pleural effusions associated with compressive atelectasis.  Due to patient's acute heart failure complicated by significant bilateral pleural effusions and need for patient to hold anticoagulation prior to thoracentesis.  Patient will require multiple nights in the hospital for acute management appropriate for inpatient care.    At the time of admission with the information available to the attending physician more than 2 nights Hospital complex care was anticipated, based on patient risk of adverse outcome if treated as outpatient and complex care required. Inpatient admission is appropriate based on the Medicare guidelines.    This document was produced using voice recognition software    The information on this document is developed by the utilization review team in order for the business office to ensure compliance. This only denotes the appropriateness of proper admission status and does not reflect the quality of  care rendered.    The definitions of Inpatient Status and Observation Status used in making the determination above are those provided in the CMS Coverage Manual, Chapter 1 and Chapter 6, section 70.4.    Sincerely,    Jose Antonio Gonzalez MD  Utilization Review  Physician Advisor  [facility].

## 2023-12-27 NOTE — PROGRESS NOTES
PRIMARY DIAGNOSIS: CONGESTIVE HEART FAILURE  OUTPATIENT/OBSERVATION GOALS TO BE MET BEFORE DISCHARGE:  Dyspnea improved and O2 sats >88% at RA or at prior home O2 therapy level: Yes        SpO2: 93 %, O2 Device: None (Room air)  Vitals:    12/25/23 0734 12/25/23 1554   Weight: 62.6 kg (138 lb) 61.1 kg (134 lb 11.2 oz)        ECHO and other diagnostic testing complete (if applicable):  tests and procedures in process    Return to near baseline physical activity: Yes    Discharge Planner Nurse   Safe discharge environment identified: Yes  Barriers to discharge: Yes. Pending procedures and test completion.       Entered by: Milena Gutierrez RN 12/27/2023 2:37 AM

## 2023-12-27 NOTE — PROGRESS NOTES
Patient transported to US for thoracentesis via wheel chair by BLADE Davison.  Spouse Melissa waiting at bedside.

## 2023-12-28 ENCOUNTER — APPOINTMENT (OUTPATIENT)
Dept: ULTRASOUND IMAGING | Facility: CLINIC | Age: 73
DRG: 291 | End: 2023-12-28
Attending: STUDENT IN AN ORGANIZED HEALTH CARE EDUCATION/TRAINING PROGRAM
Payer: MEDICARE

## 2023-12-28 VITALS
RESPIRATION RATE: 18 BRPM | DIASTOLIC BLOOD PRESSURE: 72 MMHG | OXYGEN SATURATION: 93 % | BODY MASS INDEX: 21.75 KG/M2 | WEIGHT: 135.36 LBS | SYSTOLIC BLOOD PRESSURE: 106 MMHG | HEIGHT: 66 IN | TEMPERATURE: 98.3 F | HEART RATE: 56 BPM

## 2023-12-28 LAB
ANION GAP SERPL CALCULATED.3IONS-SCNC: 12 MMOL/L (ref 7–15)
BUN SERPL-MCNC: 10.2 MG/DL (ref 8–23)
CALCIUM SERPL-MCNC: 8.9 MG/DL (ref 8.8–10.2)
CHLORIDE SERPL-SCNC: 93 MMOL/L (ref 98–107)
CHLORIDE UR-SCNC: <20 MMOL/L
CREAT SERPL-MCNC: 0.88 MG/DL (ref 0.67–1.17)
DEPRECATED HCO3 PLAS-SCNC: 24 MMOL/L (ref 22–29)
EGFRCR SERPLBLD CKD-EPI 2021: >90 ML/MIN/1.73M2
ERYTHROCYTE [DISTWIDTH] IN BLOOD BY AUTOMATED COUNT: 13.2 % (ref 10–15)
GLUCOSE SERPL-MCNC: 96 MG/DL (ref 70–99)
HCT VFR BLD AUTO: 36.5 % (ref 40–53)
HGB BLD-MCNC: 12.4 G/DL (ref 13.3–17.7)
MAGNESIUM SERPL-MCNC: 1.6 MG/DL (ref 1.7–2.3)
MCH RBC QN AUTO: 31.1 PG (ref 26.5–33)
MCHC RBC AUTO-ENTMCNC: 34 G/DL (ref 31.5–36.5)
MCV RBC AUTO: 92 FL (ref 78–100)
PLATELET # BLD AUTO: 297 10E3/UL (ref 150–450)
POTASSIUM SERPL-SCNC: 4.2 MMOL/L (ref 3.4–5.3)
RBC # BLD AUTO: 3.99 10E6/UL (ref 4.4–5.9)
SODIUM SERPL-SCNC: 129 MMOL/L (ref 135–145)
WBC # BLD AUTO: 8 10E3/UL (ref 4–11)

## 2023-12-28 PROCEDURE — 32555 ASPIRATE PLEURA W/ IMAGING: CPT

## 2023-12-28 PROCEDURE — 250N000013 HC RX MED GY IP 250 OP 250 PS 637: Performed by: STUDENT IN AN ORGANIZED HEALTH CARE EDUCATION/TRAINING PROGRAM

## 2023-12-28 PROCEDURE — 85027 COMPLETE CBC AUTOMATED: CPT | Performed by: STUDENT IN AN ORGANIZED HEALTH CARE EDUCATION/TRAINING PROGRAM

## 2023-12-28 PROCEDURE — 82436 ASSAY OF URINE CHLORIDE: CPT | Performed by: STUDENT IN AN ORGANIZED HEALTH CARE EDUCATION/TRAINING PROGRAM

## 2023-12-28 PROCEDURE — 250N000009 HC RX 250: Performed by: STUDENT IN AN ORGANIZED HEALTH CARE EDUCATION/TRAINING PROGRAM

## 2023-12-28 PROCEDURE — 80048 BASIC METABOLIC PNL TOTAL CA: CPT | Performed by: STUDENT IN AN ORGANIZED HEALTH CARE EDUCATION/TRAINING PROGRAM

## 2023-12-28 PROCEDURE — 36415 COLL VENOUS BLD VENIPUNCTURE: CPT | Performed by: STUDENT IN AN ORGANIZED HEALTH CARE EDUCATION/TRAINING PROGRAM

## 2023-12-28 PROCEDURE — 0W9B3ZZ DRAINAGE OF LEFT PLEURAL CAVITY, PERCUTANEOUS APPROACH: ICD-10-PCS | Performed by: RADIOLOGY

## 2023-12-28 PROCEDURE — 83735 ASSAY OF MAGNESIUM: CPT | Performed by: STUDENT IN AN ORGANIZED HEALTH CARE EDUCATION/TRAINING PROGRAM

## 2023-12-28 PROCEDURE — 99239 HOSP IP/OBS DSCHRG MGMT >30: CPT | Performed by: STUDENT IN AN ORGANIZED HEALTH CARE EDUCATION/TRAINING PROGRAM

## 2023-12-28 RX ORDER — PHENOL 1.4 %
10 AEROSOL, SPRAY (ML) MUCOUS MEMBRANE AT BEDTIME
Qty: 30 TABLET | Refills: 0 | Status: SHIPPED | OUTPATIENT
Start: 2023-12-28 | End: 2023-12-28

## 2023-12-28 RX ORDER — MAGNESIUM OXIDE 400 MG/1
400 TABLET ORAL DAILY
Qty: 30 TABLET | Refills: 0 | Status: SHIPPED | OUTPATIENT
Start: 2023-12-28 | End: 2024-01-25

## 2023-12-28 RX ORDER — HYDROXYZINE HYDROCHLORIDE 25 MG/1
25 TABLET, FILM COATED ORAL EVERY 8 HOURS PRN
Qty: 30 TABLET | Refills: 0 | Status: SHIPPED | OUTPATIENT
Start: 2023-12-28 | End: 2024-04-04

## 2023-12-28 RX ORDER — LIDOCAINE 40 MG/G
CREAM TOPICAL
Status: CANCELLED | OUTPATIENT
Start: 2023-12-28

## 2023-12-28 RX ADMIN — LIDOCAINE HYDROCHLORIDE 10 ML: 10 INJECTION, SOLUTION EPIDURAL; INFILTRATION; INTRACAUDAL; PERINEURAL at 08:26

## 2023-12-28 RX ADMIN — METOPROLOL SUCCINATE 12.5 MG: 25 TABLET, EXTENDED RELEASE ORAL at 08:48

## 2023-12-28 RX ADMIN — ATORVASTATIN CALCIUM 80 MG: 80 TABLET, FILM COATED ORAL at 08:48

## 2023-12-28 RX ADMIN — ESCITALOPRAM OXALATE 10 MG: 10 TABLET ORAL at 08:48

## 2023-12-28 RX ADMIN — AMIODARONE HYDROCHLORIDE 200 MG: 200 TABLET ORAL at 08:48

## 2023-12-28 RX ADMIN — CLOPIDOGREL BISULFATE 75 MG: 75 TABLET ORAL at 08:48

## 2023-12-28 RX ADMIN — LOSARTAN POTASSIUM 12.5 MG: 25 TABLET, FILM COATED ORAL at 08:52

## 2023-12-28 RX ADMIN — APIXABAN 5 MG: 5 TABLET, FILM COATED ORAL at 13:46

## 2023-12-28 ASSESSMENT — ACTIVITIES OF DAILY LIVING (ADL)
ADLS_ACUITY_SCORE: 20

## 2023-12-28 NOTE — DISCHARGE SUMMARY
[facility]  Hospitalist Discharge Summary      Date of Admission:  12/25/2023  Date of Discharge:  12/28/2023  Discharging Provider: Vincent Mcbride DO  Discharge Service: Hospitalist Service    Discharge Diagnoses   SOB  Orthopnea/PND  Bilateral Pleural Effusions  Acute Decompensated Congestive Heart Failure with reduced ejection fraction  CAD  History of AMI status post BRENDA (10/2023)  History of cardiac arrest (VT/VF arrest around time of heart attack)  Ischemic cardiomyopathy  Pelvic LAD  Prostate Cancer  Generalized anxiety disorder   Hypomagnesemia   Hyponatremia     Clinically Significant Risk Factors          Follow-ups Needed After Discharge   Follow-up Appointments     Follow-up and recommended labs and tests       Follow up with primary care provider, Jose Coles, within 7 days for   hospital follow- up.  The following labs/tests are recommended: BMP.            Discharge Disposition   Discharged to home  Condition at discharge: Stable    Hospital Course   Duane C Johnson is a 73 year old male admitted on 12/25/2023. He Has past medical history significant for recent NSTEMI status post BRENDA on dual therapy (Eliquis plus Plavix) was complicated by cardiac arrest.  He presents with shortness of breath onset since October when he had his coronary event and has progressively gotten worse. His initial dyspnea was resolved secondary to Brillinta usage, but has recurred since with persistent bilateral pleural effusions and HFrEF. This is the patient's 3rd ER visit for similar complaints. Initial workup revealed pleural effusions patient was admitted for diuresis for presumable acute decompensated heart failure.     SOB  Orthopnea/PND  Bilateral Pleural Effusions  Acute Decompensated Heart Failure  Shortness of breath likely related t/o acute decompensated heart failure with pulmonary edema and bilateral pleural effusions although due to patient habitus does not look grossly volume  overload. Patient endorses shortness of breath that he thinks is related to anxiety and states it only occurs at night when he lays flat. CTPE: moderate bilateral layering pleural effusions. Interstitial pulmonary edema. (Effusions present in 11/2023 after AMI but on 03/15/23 before AMI had CT without effusions and small nodules in lungs) so have been chronic despite diuretic therapy. Diuretics were stopped shortly after discharging from the hospital 11/22/23. He has not resumed diuretics and has improved BNP on admission (8310 -> 7970). Exam: Bibasilar Rales and diminished breath sounds bilaterally, no appreciable JVD (1 cm above clavicle with hepatojugular reflux), no lower extremity edema or abdominal distention. ER Course: Gave 1 dose of 40mg IV lasix with patient reporting 3 episodes of urination. EKG: sinus bradycardia. No acute ischemic changes and unchanged from prior grossly. Patient had bilateral thoracentesis right (12/27) and left (12/28) with 1200 mL and 800 mL removed respectively. Patient with improved shortness of breath following procedures.   - Cardiology Heart Failure focused order set utilized  - Recommended obtaining wedge pillow at home to place patient about 30 degrees for symptom relief  - Continue Lasix 20 mg daily for maintenance diuresis, patient appears to not show overt signs of hypervolemia but has had chronic bilateral pleural effusions that have contributed to multiple ER visits and likely needs to daily Lasix to reduce re accumulation. Discussed with patient and his wife that diuresis is a moving target and to pay attention to daily weight and other symptoms (orthopnea, PND, DIOP)  - Continue GDMT: ARB, beta-blocker   - Follow up with PCP 01/02 and cardiology 01/10    CAD  History of AMI status post BRENDA (10/2023)  History of cardiac arrest (VT/VF arrest around time of heart attack)  [facility] (10/26/2023): BRENDA of proximal to mid LAD.  100% distal LAD lesion, 80% first diagonal, 70% RPDA,  40% distal RCA  TTE: LVEF 30%  [facility] (11/3/2023): Enlarged LV with wall thinning and akinesis (LVEF 28%).  LGE in LAD territory consistent with large infarction.  Bilateral pleural effusions.  Consistent with ischemic cardiomyopathy and severely reduced LVEF.  Home meds: Plavix and Eliquis, Lipitor 80 mg, metoprolol 12.5 mg daily (Toprol-XL), Amiodarone    Patient on Eliquis due to large area of akinesis in the LAD territory (presumably to prevent LV thrombus formation)  - Continue Plavix and statin  - Continue eliquis 5 mg twice daily  - Continue PTA metoprolol 12.5 mg and continue amiodarone    Pelvic LAD  Prostate Cancer  Subjective left inguinal enlarged lymph node patient noticed today (I am unable to feel anything on exam)  Endorses prostate cancer diagnosis that was going to be surgically treated but the day before surgery was when patient had AMI  - OP urology follow up recommended    Generalized anxiety disorder  Previously started on escitalopram 10 mg during previous hospitalization for MAULIK. He also was taking trazodone for insomnia and hydroxyzine for PRN anxiety. On admission he reportedly stopped both trazodone and hydroxyzine due to ineffectiveness.    - Continue Lexapro 10 mg daily and hydroxyzine 25 mg PRN  - Melatonin 5-10 mg and trazodone nightly for insomnia    Hypomagnesemia  Mg 1.6, likely from diuretics.  - RN replacement protocol ordered    Hyponatremia, stable  Likely hypervolemic hyponatremia secondary to congestive heart failure and current diuretic usage. Stable on discharge 129. Urine chloride <20 so possibly intravascularly dry but also harder to interpret with recent diuretic usage.   - Recommend repeat BMP in outpatient setting    Consultations This Hospital Stay   CORE CLINIC EVALUATION IP CONSULT  OCCUPATIONAL THERAPY ADULT IP CONSULT  NUTRITION SERVICES ADULT IP CONSULT  CARE MANAGEMENT / SOCIAL WORK IP CONSULT    Code Status   Full Code    Time Spent on this Encounter   Vincent CURIEL  DO Rae, personally saw the patient today and spent greater than 30 minutes discharging this patient.       Vincent Mcbride DO  [facility] SURGICAL  [address] [facility] [address]  Phone: [phone]  Fax: [phone]  ______________________________________________________________________    Physical Exam   Vital Signs: Temp: 98.3  F (36.8  C) Temp src: Tympanic BP: 106/72 Pulse: 56   Resp: 18 SpO2: 93 % O2 Device: None (Room air)    Weight: 135 lbs 5.8 oz    Constitutional: awake, alert, cooperative, no apparent distress, and appears stated age  Respiratory: No increased work of breathing, good air exchange, clear to auscultation bilaterally, no crackles or wheezing  Cardiovascular: Normal apical impulse, regular rate and rhythm, normal S1 and S2, no S3 or S4, and no murmur noted  GI: No scars, normal bowel sounds, soft, non-distended, non-tender, no masses palpated, no hepatosplenomegally  Skin: normal skin color, texture, turgor  Musculoskeletal: There is no redness, warmth, or swelling of the joints.  Full range of motion noted.  Motor strength is 5 out of 5 all extremities bilaterally.  Tone is normal.       Primary Care Physician   Jose Coles    Discharge Orders      Home Care Referral      Medication Therapy Management Referral      Reason for your hospital stay    Acute on chronic congestive heart failure with bilateral pleural effusions     Follow-up and recommended labs and tests     Follow up with primary care provider, Jose Coles, within 7 days for hospital follow- up.  The following labs/tests are recommended: BMP.     Activity    Your activity upon discharge: activity as tolerated     Diet    Follow this diet upon discharge:  Fluid restriction 2000 ML FLUID      Combination Diet 2 gm NA Diet; No Caffeine Diet       Significant Results and Procedures   Most Recent 3 CBC's:  Recent Labs   Lab Test 12/28/23  0542 12/27/23  0543 12/26/23  0518   WBC 8.0 6.2 5.0    HGB 12.4* 11.8* 11.8*   MCV 92 93 93    264 265     Most Recent 3 BMP's:  Recent Labs   Lab Test 12/28/23  0542 12/27/23  0543 12/26/23  1725 12/26/23  0518   * 131*  --  132*   POTASSIUM 4.2 3.9 3.5 3.2*   CHLORIDE 93* 95*  --  96*   CO2 24 23  --  23   BUN 10.2 10.4  --  10.1   CR 0.88 0.91  --  0.92   ANIONGAP 12 13  --  13   PRANAV 8.9 8.9  --  8.3*   GLC 96 93  --  76   ,   Results for orders placed or performed during the hospital encounter of 12/25/23   CT Chest Pulmonary Embolism w Contrast    Narrative    EXAM: CT CHEST PULMONARY EMBOLISM WITH CONTRAST  LOCATION: [facility]  DATE: 12/25/2023    INDICATION: Shortness of breath. History of recent stenting. Cardiac arrest.  COMPARISON: None.  TECHNIQUE: CT chest pulmonary angiogram during arterial phase injection of IV contrast. Multiplanar reformats and MIP reconstructions were performed. Dose reduction techniques were used.   CONTRAST: 67 mL Isovue 370.    FINDINGS:  ANGIOGRAM CHEST: Pulmonary arteries are normal caliber and negative for pulmonary emboli. Thoracic aorta is negative for dissection. No CT evidence of right heart strain.    LUNGS AND PLEURA: Moderate bilateral layering pleural effusions. Increased interstitial markings, particularly at the lung apices likely reflecting underlying interstitial pulmonary edema. Mild perihilar groundglass opacity also likely reflecting   interstitial pulmonary edema. Bilateral dependent atelectasis.    MEDIASTINUM/AXILLAE: Mildly enlarged heart. No pericardial effusion. No hilar or mediastinal lymphadenopathy.    CORONARY ARTERY CALCIFICATION: Moderate with a stent.    UPPER ABDOMEN: Cholelithiasis.    MUSCULOSKELETAL: Mild multilevel discogenic degenerative change.      Impression    IMPRESSION:  1.  No pulmonary embolus, aortic dissection, or aneurysm.  2.  Mildly enlarged heart with coronary artery disease and pulmonary edema.  3.  Moderate bilateral layering pleural  effusions with associated compressive atelectasis.  4.  Cholelithiasis.     US Thoracentesis    Narrative    US THORACENTESIS 12/27/2023 3:30 PM    CLINICAL HISTORY: Bilateral pleural effusions    PROCEDURE: Informed consent obtained. Time out performed. The chest  was prepped and draped in sterile fashion. Less than 10 mL of 1%  lidocaine was infused into the local soft tissues. Under direct  ultrasound guidance, a 5 Latvian catheter system was placed into the  pleural effusion.     1.2 liters of clear yellow fluid were removed and sent to lab, if  requested.    Patient tolerated procedure well.    Ultrasound imaging was obtained and placed in the patient's permanent  medical record.      Impression    IMPRESSION:  1.  Status post right ultrasound-guided thoracentesis.    Reference CPT Code: 93574    JOANNE REID MD         SYSTEM ID:  [id number]   US Thoracentesis    Narrative    US THORACENTESIS 12/28/2023 8:57 AM    CLINICAL HISTORY: Pleural effusion.    PROCEDURE: Informed consent obtained. Time out performed. The chest  was prepped and draped in sterile fashion. 10 mL of 1% Xylocaine was  infused into the local soft tissues. Under direct ultrasound guidance,  a 5 Latvian catheter system was placed into the pleural effusion.     ______ 0.8 liters of clear yellow fluid were removed and sent to lab,  if requested.    Patient tolerated procedure well.    Ultrasound imaging was obtained and placed in the patient's permanent  medical record.      Impression    IMPRESSION:  Status post left ultrasound-guided thoracentesis.    Reference CPT Code: 03587       Discharge Medications   Current Discharge Medication List        START taking these medications    Details   magnesium oxide (MAG-OX) 400 MG tablet Take 1 tablet (400 mg) by mouth daily for 30 days  Qty: 30 tablet, Refills: 0    Associated Diagnoses: Hypomagnesemia           CONTINUE these medications which have CHANGED    Details   hydrOXYzine HCl (ATARAX) 25 MG  tablet Take 1 tablet (25 mg) by mouth every 8 hours as needed for anxiety  Qty: 30 tablet, Refills: 0    Associated Diagnoses: Anxiety      melatonin 5 MG tablet Take 1-2 tablets (5-10 mg) by mouth at bedtime  Qty: 60 tablet, Refills: 0    Associated Diagnoses: Anxiety           CONTINUE these medications which have NOT CHANGED    Details   acetaminophen (TYLENOL) 325 MG tablet Take 650 mg by mouth every 6 hours as needed      amiodarone (PACERONE) 200 MG tablet Take 1 tablet (200 mg) by mouth daily  Qty: 90 tablet, Refills: 3    Associated Diagnoses: Cardiac arrest (H)      apixaban ANTICOAGULANT (ELIQUIS) 5 MG tablet Take 1 tablet (5 mg) by mouth 2 times daily  Qty: 180 tablet, Refills: 3    Associated Diagnoses: Acute systolic heart failure (H); ST elevation myocardial infarction involving left anterior descending (LAD) coronary artery (H)      atorvastatin (LIPITOR) 80 MG tablet Take 1 tablet (80 mg) by mouth daily  Qty: 90 tablet, Refills: 3    Associated Diagnoses: ST elevation myocardial infarction involving left anterior descending (LAD) coronary artery (H)      cetirizine (ZYRTEC) 10 MG tablet Take 10 mg by mouth daily      clopidogrel (PLAVIX) 75 MG tablet Take 1 tablet (75 mg) by mouth daily  Qty: 30 tablet, Refills: 3    Associated Diagnoses: ST elevation myocardial infarction involving left anterior descending (LAD) coronary artery (H)      escitalopram (LEXAPRO) 10 MG tablet Take 1 tablet (10 mg) by mouth daily  Qty: 30 tablet, Refills: 1    Comments: Due for visit  Associated Diagnoses: Anxiety      fish oil-omega-3 fatty acids 1000 MG capsule Take 1 g by mouth 2 times daily Also contains another supplement      furosemide (LASIX) 20 MG tablet Take 1 tablet (20 mg) by mouth daily  Qty: 90 tablet, Refills: 1    Associated Diagnoses: Acute systolic heart failure (H)      losartan (COZAAR) 25 MG tablet Take 0.5 tablets (12.5 mg) by mouth daily  Qty: 90 tablet, Refills: 3    Associated Diagnoses: Acute  systolic heart failure (H)      metoprolol succinate ER (TOPROL XL) 25 MG 24 hr tablet Take 0.5 tablets (12.5 mg) by mouth daily  Qty: 60 tablet, Refills: 3    Associated Diagnoses: Acute systolic heart failure (H)      multivitamin w/minerals (THERA-VIT-M) tablet Take 1 tablet by mouth daily      traZODone (DESYREL) 50 MG tablet Take 25 mg by mouth nightly as needed for sleep      vitamin C (ASCORBIC ACID) 1000 MG TABS Take 1,000 mg by mouth daily           Allergies   Allergies   Allergen Reactions    Brilinta [Ticagrelor] Other (See Comments) and Difficulty breathing     Per pt and spouse, hyperventilation.    Clonazepam Other (See Comments)     Per spouse, acted like a zombie and he was shaky, could barely talk.

## 2023-12-28 NOTE — PROGRESS NOTES
[facility] [facility] DISCHARGE NOTE    Patient discharged to home at 1400 via wheel chair. Accompanied by spouse and staff. Discharge instructions reviewed with patient and spouse, opportunity offered to ask questions. Prescriptions sent to patients preferred pharmacy. All belongings sent with patient.    Vania Reyes RN

## 2023-12-28 NOTE — PLAN OF CARE
Goal Outcome Evaluation:      Plan of Care Reviewed With: patient, spouse    Patient improving overall. Patient states he is able to breath easier after having thoracentesis. Plan is to return home with wife and home care  Problem: Adult Inpatient Plan of Care  Goal: Plan of Care Review  Description: The Plan of Care Review/Shift note should be completed every shift.  The Outcome Evaluation is a brief statement about your assessment that the patient is improving, declining, or no change.  This information will be displayed automatically on your shift  note.  Outcome: Progressing  Flowsheets (Taken 12/27/2023 2241)  Plan of Care Reviewed With:   patient   spouse    tomorrow.

## 2023-12-28 NOTE — PROGRESS NOTES
Right sided thoracentesis performed by radiologist.    800 ml of clear yellow fluid removed. Pressure held at site after catheter removed.  Band aid applied. No bleeding noted.

## 2023-12-28 NOTE — PLAN OF CARE
Problem: Adult Inpatient Plan of Care  Goal: Optimal Comfort and Wellbeing  Outcome: Progressing  Intervention: Provide Person-Centered Care  Recent Flowsheet Documentation  Taken 12/27/2023 9849 by Stacie Salinas RN  Trust Relationship/Rapport:   care explained   choices provided   questions answered   thoughts/feelings acknowledged   reassurance provided   Goal Outcome Evaluation:      Plan of Care Reviewed With: patient    Overall Patient Progress: improvingOverall Patient Progress: improving         Patient is alert and oriented.  Pt reports feeling anxious due to frequent hospitalizations and recent health issues.  Pt requested 1L of oxygen via nasal cannula for comfort due to anxiety.  IV saline locked.  Pt reports his breathing has improved since thoracentesis yesterday.

## 2023-12-28 NOTE — PROGRESS NOTES
Care Management Discharge Note    Discharge Date: 12/28/2023       Discharge Disposition: Home, Home Care    Discharge Services: None    Discharge DME: Oxygen    Discharge Transportation: family or friend will provide    Private pay costs discussed: Not applicable    Does the patient's insurance plan have a 3 day qualifying hospital stay waiver?  No    PAS Confirmation Code:  N/A  Patient/family educated on Medicare website which has current facility and service quality ratings: no    Education Provided on the Discharge Plan: Yes  Persons Notified of Discharge Plans: Patient  Patient/Family in Agreement with the Plan: yes    Handoff Referral Completed: Yes    Additional Information:    Plan:  Home with resumption of [facility] RN and PT.      Stephanie Prince, RN

## 2023-12-29 ENCOUNTER — PATIENT OUTREACH (OUTPATIENT)
Dept: CARE COORDINATION | Facility: CLINIC | Age: 73
End: 2023-12-29
Payer: MEDICARE

## 2023-12-29 LAB
LYMPHOCYTES NFR FLD MANUAL: 48 %
MONOS+MACROS NFR FLD MANUAL: 28 %
NEUTS BAND NFR FLD MANUAL: 8 %
OTHER CELLS FLD MANUAL: 16 %

## 2023-12-29 ASSESSMENT — ACTIVITIES OF DAILY LIVING (ADL): DEPENDENT_IADLS:: INDEPENDENT

## 2023-12-29 NOTE — PROGRESS NOTES
Clinic Care Coordination Contact  Clinic Care Coordination Contact  OUTREACH    Referral Information:  Referral Source: IP Handoff    Primary Diagnosis: CHF    Chief Complaint   Patient presents with    Clinic Care Coordination - Post Hospital     Clinic care Coordination RN         [place] Utilization: [facility]  Hospitalist Discharge Summary       Date of Admission:  12/25/2023  Date of Discharge:  12/28/2023  Discharging Provider: Vincent Mcbride DO  Discharge Service: Hospitalist Service        Discharge Diagnoses  SOB  Orthopnea/PND  Bilateral Pleural Effusions  Acute Decompensated Congestive Heart Failure with reduced ejection fraction  CAD  History of AMI status post BRENDA (10/2023)  History of cardiac arrest (VT/VF arrest around time of heart attack)  Ischemic cardiomyopathy  Pelvic LAD  Prostate Cancer  Generalized anxiety disorder   Hypomagnesemia   Hyponatremia   Clinic Utilization  Difficulty keeping appointments:: No  Compliance Concerns: No  No-Show Concerns: No  No PCP office visit in Past Year: No  Utilization      No Show Count (past year)  1             ED Visits  7             Hospital Admissions  5                    Current as of: 12/29/2023  8:36 AM                Clinical Concerns:  Current Medical Concerns:  CC Rn spoke to Melissa C2 communicate on file . Melissa reports the patient is napping.  Patient slept well last nigh  Went to CR appointment this morning and he was informed they will resume CR after PCP appointment .  Patient has home care services     Current Behavioral Concerns: No    Education Provided to patient: call with increased symptoms of concern    Pain  Pain (GOAL):: No  Health Maintenance Reviewed: Not assessed  Clinical Pathway: Clinic Care Coordination CHF Assessment    Discharge:      Day of hospital discharge: 12/28/23  What recommendations were made for follow up after your recent hospitalization? PCP and Cardiology follow up  Have the follow up  "appointments been scheduled? Yes  If not, can I help you set up these appointments? No       CHF:    Home scale available:  Yes  Home scale weight this mornin was 138 had 2 liters of fluid removed in the hospital  Heart Failure Zones sheet on refrigerator or available: No  Any increased SOB since hospital discharge:  No  Any increased edema since hospital discharge:  No  What number to call for YELLOW zones: [phone]    Symptom Review:   Respiratory  Shortness of breath:: No  Shortness of breath symptom course:: Stable  Waking up at night short of breath?: No  Prop up on pillows to breathe easier? : No  Wheezing or noisy breathing?: No  Cough: No  Increased sputum: No  Fever  Fever: No  Cardiovascular  Chest pain: : No  Does patient have an implanted device?: No  Is patient on remote monitoring?: No  Dizzy or Lightheaded: No  Fatigue: Yes  Fatigue:: Yes, at baseline  Weakness (Heaviness in limbs):: No  Diet/Education/Medication  Checking weight daily? : Yes  Weight?: Loss  Today's Weight?:  (132#)  Weight increase more than 2 lbs in 24 hours?: No  Weight Increase more than 5 lbs in 1 week? : No  Medication adherence problem (GOAL):: No  Knowledgeable about how to use meds:: Yes  Medication side effects suspected:: No  Does the patient have understanding of Diuretic self-management?: Yes  Diet:: No added salt (fluid restriction)  GI/  Appetite:: Normal  Appetite:: Normal  Urination:: Normal  Urination:: Normal     Patient Perception of Heart Failure  What Heart Failure zone are you currently in?: Green  Overall your CHF symptoms are (GOAL):: Stable    Medications:  \"How many new medications are you on since your hospitalization?\"  2 or more -- Epic MTM referral needed  \"How many of your current medicines changed (dose, timing, name, etc.) while you were in the hospital?\"  2 or more -- Epic MTM referral needed  \"Do you have questions about your medications?\"  No    Is patient on Warfarin?  No  Is Ejection " Fraction <40%: Yes:   Patient on ARB and one of the following beta blockers: Metoprolol succinate    When is the first appointment with the CHF clinic: 1/10/2024               Medication Management:  Medication review status: Medications reviewed.  Changes noted per patient report.       Functional Status:  Dependent ADLs:: Independent  Dependent IADLs:: Independent  Bed or wheelchair confined:: No  Mobility Status: Independent    Living Situation:  Current living arrangement:: I live in a private home with spouse    Lifestyle & Psychosocial Needs:    Social Determinants of Health     Food Insecurity: Low Risk  (11/23/2023)    Food Insecurity     Within the past 12 months, did you worry that your food would run out before you got money to buy more?: No     Within the past 12 months, did the food you bought just not last and you didn t have money to get more?: No   Depression: Not at risk (11/24/2023)    PHQ-2     PHQ-2 Score: 0   Housing Stability: Low Risk  (11/23/2023)    Housing Stability     Do you have housing? : Yes     Are you worried about losing your housing?: No   Tobacco Use: Medium Risk (12/20/2023)    Patient History     Smoking Tobacco Use: Former     Smokeless Tobacco Use: Never     Passive Exposure: Current   Financial Resource Strain: Low Risk  (11/23/2023)    Financial Resource Strain     Within the past 12 months, have you or your family members you live with been unable to get utilities (heat, electricity) when it was really needed?: No   Alcohol Use: Not on file   Transportation Needs: Low Risk  (11/23/2023)    Transportation Needs     Within the past 12 months, has lack of transportation kept you from medical appointments, getting your medicines, non-medical meetings or appointments, work, or from getting things that you need?: No   Physical Activity: Inactive (11/16/2023)    Exercise Vital Sign     Days of Exercise per Week: 0 days     Minutes of Exercise per Session: 0 min   Interpersonal  Safety: Low Risk  (10/9/2023)    Interpersonal Safety     Do you feel physically and emotionally safe where you currently live?: Yes     Within the past 12 months, have you been hit, slapped, kicked or otherwise physically hurt by someone?: No     Within the past 12 months, have you been humiliated or emotionally abused in other ways by your partner or ex-partner?: No   Stress: Not on file   Social Connections: Unknown (11/16/2023)    Social Connection and Isolation Panel [NHANES]     Frequency of Communication with Friends and Family: Not on file     Frequency of Social Gatherings with Friends and Family: Not on file     Attends Baptism Services: Not on file     Active Member of Clubs or Organizations: Not on file     Attends Club or Organization Meetings: Not on file     Marital Status:      Diet:: No added salt  Inadequate nutrition (GOAL):: No  Tube Feeding: No  Inadequate activity/exercise (GOAL):: No  Significant changes in sleep pattern (GOAL): No  Transportation means:: Family     Baptism or spiritual beliefs that impact treatment:: No  Mental health DX:: Yes  Mental health DX how managed:: Medication  Mental health management concern (GOAL):: No  Chemical Dependency Status: No Current Concerns  Informal Support system:: Spouse             Resources and Interventions:  Current Resources:   Skilled Home Care Services: Skilled Nursing, Home Health Aid, Physical Therapy, Occupational Therapy  Community Resources: Home Care  Supplies Currently Used at Home: None                    Referrals Placed: None         Care Plan:  Care Plan: Heart Failure       Problem: Heart Failure Management Needs Improvement       Goal: Demonstrate improved heart failure management       Start Date: 11/16/2023 Expected End Date: 2/23/2024    This Visit's Progress: 60% Recent Progress: 50%    Priority: High    Note:     Barriers: Deconditioned   Strengths: supportive wife   Patient expressed understanding of goal: Yes    Action steps to achieve this goal:  1. I will check my weight daily and call if weight gain is 2-3# in a day or 5# in a week,increased shortness of breath episodes or leg swelling   2. I will take my medications as directed and keep future provider appointments   3. I will keep future Cardiac Rehab appointments   4. I will continue home care services                                Patient/Caregiver understanding: Melissa wife expresses good understanding of discharge instructions     Outreach Frequency: weekly, more frequently as needed  Future Appointments                In 4 days Jacqui Nicole MD [facility]    In 5 days [place] CARD RHB 1 M [facility] [place] [place] [facility]    In 1 week [place] CARD RHB 1 M [facility] [place] [facility]    In 1 week [place] C[place] RHB 1 M [facility] [place] [facility]    In 1 week  LAB [facility]    In 1 week [place] C[place] RHB 1 M [facility]    In 1 week Sirisha Arias, NADEGE CNP [facility]    In 2 weeks [place] CARD RHB 1 M [facility] [place] [facility]    In 2 weeks [place] CARD RHB 1 M [facility]    In 2 weeks [place] C[place] RHB 1 M [facility] [place] [facility]    In 3 weeks [place] C[place] RHB 1 M [facility] [place] [facility]    In 3 weeks [place] CARD RHB 1 M [facility] [place] [facility]    In 3 weeks [place] CARD RHB 1 M [facility] [place] [facility]    In 3 weeks Blas Causey MD [facility] Estela  ALLEN RIZVI     In 4 weeks [place] CARD RHB 1 M [facility] [place] [place] [facility]    In 1 month [place] CARD RHB 1 M [facility] [place] [facility]    In 1 month [place] CARD RHB 1 M [facility] [place] [facility]    In 1 month [place] CARD RHB 1 M [facility] [place] [facility]    In 1 month [place] CARD RHB 1 M [facility] [place] [facility]    In 1 month [place] CARD RHB 1 M [facility] [place] [facility]    In 1 month [place] CARD RHB 1 M [facility] [place] [facility]    In 1 month [place] C[place] RHB 1 M [facility] [place] [facility]    In 1 month [place] C[place] RHB 1 M [facility] [place] [facility]    In 1 month  CRITICAL CARE [facility] Heart [facility]    In 2 months Steve Dixon DO [facility] Son[facility]    In 3 months Sedrick Lehman, PhD [facility] Neuropsychology [facility]            Plan:   Patient will start home care services   Patient will keep hospital follow up scheduled 1/2/2024  CC RN will follow up in 3-5 business days      [facility]   Ruthy Paiz RN, Care Coordinator   [facility]'s   E-mail [email or web address]   [phone]

## 2023-12-30 ENCOUNTER — APPOINTMENT (OUTPATIENT)
Dept: GENERAL RADIOLOGY | Facility: CLINIC | Age: 73
End: 2023-12-30
Attending: EMERGENCY MEDICINE
Payer: MEDICARE

## 2023-12-30 ENCOUNTER — TELEPHONE (OUTPATIENT)
Dept: CARDIOLOGY | Facility: CLINIC | Age: 73
End: 2023-12-30
Payer: MEDICARE

## 2023-12-30 ENCOUNTER — HOSPITAL ENCOUNTER (EMERGENCY)
Facility: CLINIC | Age: 73
Discharge: HOME | End: 2023-12-30
Attending: EMERGENCY MEDICINE | Admitting: EMERGENCY MEDICINE
Payer: MEDICARE

## 2023-12-30 VITALS
HEART RATE: 54 BPM | RESPIRATION RATE: 19 BRPM | WEIGHT: 132 LBS | TEMPERATURE: 98.3 F | SYSTOLIC BLOOD PRESSURE: 107 MMHG | OXYGEN SATURATION: 96 % | BODY MASS INDEX: 21.21 KG/M2 | DIASTOLIC BLOOD PRESSURE: 65 MMHG | HEIGHT: 66 IN

## 2023-12-30 DIAGNOSIS — R06.02 SHORTNESS OF BREATH: ICD-10-CM

## 2023-12-30 LAB
ALBUMIN SERPL BCG-MCNC: 3.6 G/DL (ref 3.5–5.2)
ALBUMIN UR-MCNC: NEGATIVE MG/DL
ALP SERPL-CCNC: 107 U/L (ref 40–150)
ALT SERPL W P-5'-P-CCNC: 24 U/L (ref 0–70)
ANION GAP SERPL CALCULATED.3IONS-SCNC: 10 MMOL/L (ref 7–15)
APPEARANCE UR: CLEAR
AST SERPL W P-5'-P-CCNC: 24 U/L (ref 0–45)
BASE EXCESS BLDV CALC-SCNC: 4.2 MMOL/L (ref -7.7–1.9)
BILIRUB SERPL-MCNC: 0.5 MG/DL
BILIRUB UR QL STRIP: NEGATIVE
BUN SERPL-MCNC: 13.8 MG/DL (ref 8–23)
CALCIUM SERPL-MCNC: 9 MG/DL (ref 8.8–10.2)
CHLORIDE SERPL-SCNC: 97 MMOL/L (ref 98–107)
COLOR UR AUTO: YELLOW
CREAT SERPL-MCNC: 1 MG/DL (ref 0.67–1.17)
DEPRECATED HCO3 PLAS-SCNC: 27 MMOL/L (ref 22–29)
EGFRCR SERPLBLD CKD-EPI 2021: 79 ML/MIN/1.73M2
FLUAV RNA SPEC QL NAA+PROBE: NEGATIVE
FLUBV RNA RESP QL NAA+PROBE: NEGATIVE
GLUCOSE SERPL-MCNC: 86 MG/DL (ref 70–99)
GLUCOSE UR STRIP-MCNC: NEGATIVE MG/DL
HCO3 BLDV-SCNC: 30 MMOL/L (ref 21–28)
HGB UR QL STRIP: NEGATIVE
HOLD SPECIMEN: NORMAL
KETONES UR STRIP-MCNC: NEGATIVE MG/DL
LACTATE SERPL-SCNC: 0.7 MMOL/L (ref 0.7–2)
LEUKOCYTE ESTERASE UR QL STRIP: NEGATIVE
LIPASE SERPL-CCNC: 28 U/L (ref 13–60)
MAGNESIUM SERPL-MCNC: 1.9 MG/DL (ref 1.7–2.3)
NITRATE UR QL: NEGATIVE
NT-PROBNP SERPL-MCNC: 6246 PG/ML (ref 0–900)
O2/TOTAL GAS SETTING VFR VENT: 0 %
PCO2 BLDV: 49 MM HG (ref 40–50)
PH BLDV: 7.39 [PH] (ref 7.32–7.43)
PH UR STRIP: 5 [PH] (ref 5–7)
PO2 BLDV: 25 MM HG (ref 25–47)
POTASSIUM SERPL-SCNC: 3.7 MMOL/L (ref 3.4–5.3)
PROCALCITONIN SERPL IA-MCNC: 0.07 NG/ML
PROT SERPL-MCNC: 6.8 G/DL (ref 6.4–8.3)
RBC URINE: 1 /HPF
RSV RNA SPEC NAA+PROBE: NEGATIVE
SARS-COV-2 RNA RESP QL NAA+PROBE: NEGATIVE
SODIUM SERPL-SCNC: 134 MMOL/L (ref 135–145)
SP GR UR STRIP: 1.01 (ref 1–1.03)
T4 FREE SERPL-MCNC: 1.49 NG/DL (ref 0.9–1.7)
TROPONIN T SERPL HS-MCNC: 43 NG/L
TROPONIN T SERPL HS-MCNC: 47 NG/L
TSH SERPL DL<=0.005 MIU/L-ACNC: 5.24 UIU/ML (ref 0.3–4.2)
UROBILINOGEN UR STRIP-MCNC: NORMAL MG/DL
WBC URINE: <1 /HPF

## 2023-12-30 PROCEDURE — 84443 ASSAY THYROID STIM HORMONE: CPT | Performed by: EMERGENCY MEDICINE

## 2023-12-30 PROCEDURE — 96361 HYDRATE IV INFUSION ADD-ON: CPT | Performed by: EMERGENCY MEDICINE

## 2023-12-30 PROCEDURE — 83690 ASSAY OF LIPASE: CPT | Performed by: EMERGENCY MEDICINE

## 2023-12-30 PROCEDURE — 83880 ASSAY OF NATRIURETIC PEPTIDE: CPT | Performed by: EMERGENCY MEDICINE

## 2023-12-30 PROCEDURE — 87637 SARSCOV2&INF A&B&RSV AMP PRB: CPT | Performed by: EMERGENCY MEDICINE

## 2023-12-30 PROCEDURE — 81001 URINALYSIS AUTO W/SCOPE: CPT | Performed by: EMERGENCY MEDICINE

## 2023-12-30 PROCEDURE — 84484 ASSAY OF TROPONIN QUANT: CPT | Performed by: EMERGENCY MEDICINE

## 2023-12-30 PROCEDURE — 80053 COMPREHEN METABOLIC PANEL: CPT | Performed by: EMERGENCY MEDICINE

## 2023-12-30 PROCEDURE — 99285 EMERGENCY DEPT VISIT HI MDM: CPT | Mod: 25 | Performed by: EMERGENCY MEDICINE

## 2023-12-30 PROCEDURE — 71046 X-RAY EXAM CHEST 2 VIEWS: CPT

## 2023-12-30 PROCEDURE — 93308 TTE F-UP OR LMTD: CPT | Performed by: EMERGENCY MEDICINE

## 2023-12-30 PROCEDURE — 96374 THER/PROPH/DIAG INJ IV PUSH: CPT | Performed by: EMERGENCY MEDICINE

## 2023-12-30 PROCEDURE — 93005 ELECTROCARDIOGRAM TRACING: CPT | Performed by: EMERGENCY MEDICINE

## 2023-12-30 PROCEDURE — 83605 ASSAY OF LACTIC ACID: CPT | Performed by: EMERGENCY MEDICINE

## 2023-12-30 PROCEDURE — 83735 ASSAY OF MAGNESIUM: CPT | Performed by: EMERGENCY MEDICINE

## 2023-12-30 PROCEDURE — 258N000003 HC RX IP 258 OP 636: Performed by: EMERGENCY MEDICINE

## 2023-12-30 PROCEDURE — 36415 COLL VENOUS BLD VENIPUNCTURE: CPT | Performed by: EMERGENCY MEDICINE

## 2023-12-30 PROCEDURE — 93010 ELECTROCARDIOGRAM REPORT: CPT | Performed by: EMERGENCY MEDICINE

## 2023-12-30 PROCEDURE — 93308 TTE F-UP OR LMTD: CPT | Mod: 26 | Performed by: EMERGENCY MEDICINE

## 2023-12-30 PROCEDURE — 82803 BLOOD GASES ANY COMBINATION: CPT | Performed by: EMERGENCY MEDICINE

## 2023-12-30 PROCEDURE — 250N000011 HC RX IP 250 OP 636: Performed by: EMERGENCY MEDICINE

## 2023-12-30 PROCEDURE — 84145 PROCALCITONIN (PCT): CPT | Performed by: EMERGENCY MEDICINE

## 2023-12-30 PROCEDURE — 84439 ASSAY OF FREE THYROXINE: CPT | Performed by: EMERGENCY MEDICINE

## 2023-12-30 RX ORDER — BUMETANIDE 1 MG/1
1 TABLET ORAL DAILY
Qty: 30 TABLET | Refills: 3 | Status: SHIPPED | OUTPATIENT
Start: 2023-12-30 | End: 2024-05-01

## 2023-12-30 RX ORDER — MAGNESIUM SULFATE HEPTAHYDRATE 40 MG/ML
2 INJECTION, SOLUTION INTRAVENOUS ONCE
Status: COMPLETED | OUTPATIENT
Start: 2023-12-30 | End: 2023-12-30

## 2023-12-30 RX ORDER — BUMETANIDE 0.25 MG/ML
1 INJECTION INTRAMUSCULAR; INTRAVENOUS ONCE
Status: COMPLETED | OUTPATIENT
Start: 2023-12-30 | End: 2023-12-30

## 2023-12-30 RX ADMIN — SODIUM CHLORIDE, POTASSIUM CHLORIDE, SODIUM LACTATE AND CALCIUM CHLORIDE 250 ML: 600; 310; 30; 20 INJECTION, SOLUTION INTRAVENOUS at 15:40

## 2023-12-30 RX ADMIN — MAGNESIUM SULFATE HEPTAHYDRATE 2 G: 2 INJECTION, SOLUTION INTRAVENOUS at 18:19

## 2023-12-30 RX ADMIN — BUMETANIDE 1 MG: 0.25 INJECTION INTRAMUSCULAR; INTRAVENOUS at 20:24

## 2023-12-30 ASSESSMENT — ACTIVITIES OF DAILY LIVING (ADL)
ADLS_ACUITY_SCORE: 35

## 2023-12-30 NOTE — TELEPHONE ENCOUNTER
Telephone Visit Note:    Called by Mr. Aguila given low blood pressure. Had three consecutive readings with SBP in the mid 70s. Is feeling increasingly short of breath today. I instructed him to stop taking his losartan (already took it this morning) and be evaluated at either [facility] ED or a local ED. He and his wife were agreeable with this plan.     Jamal Mcduffie MD  Cardiology Fellow

## 2023-12-30 NOTE — ED PROVIDER NOTES
History     Chief Complaint   Patient presents with    Shortness of Breath    Hypotension     HPI  Duane C Johnson is a 73 year old male who is with shortness of breath and low blood pressure.    Patient was discharged from this hospital yesterday. I reviewed the ED note and the discharge summary.     From that discharge summary:    Duane C Johnson is a 73 year old male admitted on 12/25/2023. He Has past medical history significant for recent NSTEMI status post BRENDA on dual therapy (Eliquis plus Plavix) was complicated by cardiac arrest.  He presents with shortness of breath onset since October when he had his coronary event and has progressively gotten worse. His initial dyspnea was resolved secondary to Brillinta usage, but has recurred since with persistent bilateral pleural effusions and HFrEF. This is the patient's 3rd ER visit for similar complaints. Initial workup revealed pleural effusions patient was admitted for diuresis for presumable acute decompensated heart failure.     Pleural effusions were drained on both sides during that admission.    Has no fevers but this morning he felt short of breath and his wife checked his blood pressure.  He has systolic pressure in the 70s and on arrival it was 54/37.    No new cough or chest pain.    He is on blood thinners.    Was diuresed while admitted and he was discharged on Lasix 20 mg daily.  I reviewed he was not hypervolemic on exam.     No new leg swelling.  He is taking Lexapro 10 mg and hydroxyzine 25 mg as needed for anxiety.  Magnesium was 1.6 during his time in the hospital.  He had some hyponatremia as well.  This has been stable.    Wife is at bedside and relays that she called the triage line and was told to come to the ER.    Says she is on metoprolol.  He is taking the Lasix but he has not been urinating very much per his wife.    Allergies:  Allergies   Allergen Reactions    Brilinta [Ticagrelor] Other (See Comments) and Difficulty breathing      Per pt and spouse, hyperventilation.    Clonazepam Other (See Comments)     Per spouse, acted like a zombie and he was shaky, could barely talk.       Problem List:    Patient Active Problem List    Diagnosis Date Noted    Acute on chronic congestive heart failure, unspecified heart failure type (H) 12/25/2023     Priority: Medium    Acute decompensated heart failure (H) 12/25/2023     Priority: Medium    Anxiety 11/13/2023     Priority: Medium    Congestive heart failure, unspecified HF chronicity, unspecified heart failure type (H) 11/13/2023     Priority: Medium    ST elevation myocardial infarction involving left anterior descending (LAD) coronary artery (H) 11/13/2023     Priority: Medium    Acute respiratory failure with hypoxia (H) 11/07/2023     Priority: Medium    Multifocal pneumonia 11/07/2023     Priority: Medium    Elevated brain natriuretic peptide (BNP) level 11/07/2023     Priority: Medium    Primary hypertension 11/07/2023     Priority: Medium    SOB (shortness of breath) 11/07/2023     Priority: Medium    Coronary artery disease due to calcified coronary lesion 11/07/2023     Priority: Medium    Pneumonia of left upper lobe due to infectious organism 11/07/2023     Priority: Medium    Systolic CHF (H) 11/07/2023     Priority: Medium    Hx of cardiac arrest 10/27/2023     Priority: Medium    ST elevation MI (STEMI) (H) 10/26/2023     Priority: Medium    Prostate cancer (H) 09/23/2023     Priority: Medium    Hyperlipidemia LDL goal <130 01/26/2016     Priority: Medium    Adiposity 05/06/2010     Priority: Medium    Tobacco dependence syndrome 01/23/2008     Priority: Medium        Past Medical History:    Past Medical History:   Diagnosis Date    Benign essential hypertension     CAD (coronary artery disease)     Chronic systolic heart failure (H)     Hypertension     ST elevation myocardial infarction (STEMI), unspecified artery (H)     Ventricular fibrillation (H)        Past Surgical History:     Past Surgical History:   Procedure Laterality Date    COLONOSCOPY N/A 3/23/2023    Procedure: COLONOSCOPY, FLEXIBLE, WITH LESION REMOVAL USING SNARE;  Surgeon: Jose Faustin MD;  Location: [facility]    CV CENTRAL VENOUS CATHETER PLACEMENT N/A 10/26/2023    Procedure: Central Venous Catheter Placement;  Surgeon: Rob Lyles MD;  Location:  [facility]    CV CORONARY ANGIOGRAM N/A 10/27/2023    Procedure: Coronary Angiogram;  Surgeon: Rob Lyles MD;  Location:  [facility]    CV CORONARY ANGIOGRAM N/A 10/26/2023    Procedure: Coronary Angiogram;  Surgeon: Rob Lyles MD;  Location:  [facility]    CV LEFT HEART CATH N/A 10/26/2023    Procedure: Left Heart Catheterization;  Surgeon: Rob Lyles MD;  Location:  [facility]    CV PCI N/A 10/27/2023    Procedure: Percutaneous Coronary Intervention;  Surgeon: Rob Lyles MD;  Location:  [facility]    CV PCI STENT DRUG ELUTING N/A 10/26/2023    Procedure: Percutaneous Coronary Intervention Stent;  Surgeon: Rob Lyles MD;  Location:  [facility]       Family History:    Family History   Problem Relation Age of Onset    Other Cancer Mother     Obesity Mother     Cervical Cancer Sister     GI problems Father     Other Cancer Sister        Social History:  Marital Status:   [2]  Social History     Tobacco Use    Smoking status: Former     Packs/day: .25     Types: Cigarettes     Passive exposure: Current    Smokeless tobacco: Never    Tobacco comments:     Quit 10/26/2023   Vaping Use    Vaping Use: Never used   Substance Use Topics    Alcohol use: Yes     Comment: occas    Drug use: No        Medications:    bumetanide (BUMEX) 1 MG tablet  acetaminophen (TYLENOL) 325 MG tablet  amiodarone (PACERONE) 200 MG tablet  apixaban ANTICOAGULANT (ELIQUIS) 5 MG tablet  atorvastatin (LIPITOR) 80 MG tablet  cetirizine (ZYRTEC) 10  "MG tablet  clopidogrel (PLAVIX) 75 MG tablet  escitalopram (LEXAPRO) 10 MG tablet  fish oil-omega-3 fatty acids 1000 MG capsule  furosemide (LASIX) 20 MG tablet  hydrOXYzine HCl (ATARAX) 25 MG tablet  losartan (COZAAR) 25 MG tablet  magnesium oxide (MAG-OX) 400 MG tablet  melatonin 5 MG tablet  metoprolol succinate ER (TOPROL XL) 25 MG 24 hr tablet  multivitamin w/minerals (THERA-VIT-M) tablet  traZODone (DESYREL) 50 MG tablet  vitamin C (ASCORBIC ACID) 1000 MG TABS          Review of Systems    Physical Exam   BP: (!) 54/37  Pulse: 51  Temp: 98.3  F (36.8  C)  Resp: 20  Height: 167.6 cm (5' 6\")  Weight: 59.9 kg (132 lb)  SpO2: 96 %      Physical Exam  Constitutional:       General: He is not in acute distress.     Appearance: He is not toxic-appearing.   HENT:      Head: Normocephalic and atraumatic.      Right Ear: External ear normal.      Nose: No congestion.      Mouth/Throat:      Mouth: Mucous membranes are moist.      Comments: Lips appear dry  Eyes:      General:         Right eye: No discharge.         Left eye: No discharge.   Neck:      Comments: No JVD, unable to appreciate JVP  Cardiovascular:      Rate and Rhythm: Bradycardia present.      Comments: Bradycardic in the 50s  S1, S2, no appreciable S3  Pulmonary:      Comments: Crackles at base of lungs, bilateral breath sounds  Thoracentesis sites are dressed bilaterally, no surrounding erythema  Abdominal:      Tenderness: There is no right CVA tenderness or left CVA tenderness.   Skin:     Capillary Refill: Capillary refill takes less than 2 seconds.      Coloration: Skin is not jaundiced.   Neurological:      General: No focal deficit present.      Cranial Nerves: No cranial nerve deficit.      Comments: Awake and alert   Psychiatric:      Comments: Very nice         ED Course              ED Course as of 12/31/23 1320   Sat Dec 30, 2023   1603 Gases reassuring.  Sodium is 134.  Creatinine is within normal limits.  His TSH is rather high at 5.24.  " Pending free T4.  Procalcitonin is reassuring at 0.7.  Troponin slightly high at 47 and I will recheck this so the patient does have heart disease and I doubt he is having an ischemic event.   1604 The patient's EKG is reassuring for ischemia and shows low voltage and bradycardia at 48 BPM.    1713 BP: 94/62  Patient's BNP is 6246   1713 Free T4 is within normal limits.   1757 Pressure has improved greatly here.   1757 Based on my chart review, the patient's BNP is only slightly less than it was when he was admitted 25th.   1842 I updated the patient and his wife.  The patient would like to go home if possible.  I think this is all due to fluid shifts after his thoracenteses.   1923 I independently interpreted the chest x-ray, the pleural effusions are resolved.   1959 Chest x-ray was read as having a trace effusion and some small amount off pulmonary edema.   2019 I spoke with the patient his wife.  They would like to go home.  I think this is probably reasonable.  He has an appointment on Tuesday.  It sounds like he is not urinating very much and his wife does not think the furosemide is working.  Given the chest x-ray and his elevated BNP, I think that my initial assessment that he was over diuresed was incorrect.  I think that he needs to be shifted on the Constantino-Starling curve optimize his hemodynamics.  I am going to give him IV Bumex here.  I prescribed some oral Bumex, which is better absorbed in the setting of gut edema, to his preferred pharmacy.  Will continue the Lasix tomorrow and  the Bumex on Tuesday when he has an appointment.  Been stable here and feels well.  He says his breathing is improved.  He says that the sensation of feeling short of breath was similar to when he was admitted for CHF.  I do not think he needs admission right now and he does not either.  His procalcitonin is reassuring.  Gave ER precautions and they expressed understanding.  I will discharge him at their request at this  time.     Procedures    Results for orders placed during the hospital encounter of 12/30/23    POC US ECHO LIMITED    Impression  Poor ef, ?small effusion, ?small IVC            EKG Interpretation:      Interpreted by Craig Gale MD  Time reviewed: 404  Symptoms at time of EKG: hypotension   Rhythm: sinus bradycardia  Rate: 48  Axis: Left Axis Deviation  Ectopy: none  Conduction: 1st degree AV block, low voltage  ST Segments/ T Waves: Non-specific ST-T wave changes  Q Waves: v1, v2, v3, v4, v5, and v6  Comparison to prior: no ischemic changes from 12/25    Clinical Impression: non-specific EKG                 Limited Bedside Cardiac Ultrasound, performed and interpreted by me.   Indication: Shortness of Breath.  Parasternal long axis and subcostal views were acquired.   Image quality was satisfactory.    Findings:    Poor EF  No large effusion  Slightly flat IVC    IMPRESSION:   Poor EF  No large effusion  Slightly flat IVC        Results for orders placed or performed during the hospital encounter of 12/30/23 (from the past 24 hour(s))   [place] Draw    Narrative    The following orders were created for panel order [place] Draw.  Procedure                               Abnormality         Status                     ---------                               -----------         ------                     Extra Blue Top Tube[[id number]]                              Final result               Extra Red Top Tube[[id number]]                               Final result               Extra Green Top (Lithium...[[id number]]                      Final result               Extra Blood Bank Purple ...[[id number]]                      Final result                 Please view results for these tests on the individual orders.   Extra Blue Top Tube   Result Value Ref Range    Hold Specimen [facility]    Extra Red Top Tube   Result Value Ref Range    Hold Specimen [facility]    Extra Green Top (Lithium Heparin) Tube   Result Value Ref Range    Hold  Specimen [facility]    Extra Blood Bank Purple Top Tube   Result Value Ref Range    Hold Specimen [facility]    Comprehensive metabolic panel   Result Value Ref Range    Sodium 134 (L) 135 - 145 mmol/L    Potassium 3.7 3.4 - 5.3 mmol/L    Carbon Dioxide (CO2) 27 22 - 29 mmol/L    Anion Gap 10 7 - 15 mmol/L    Urea Nitrogen 13.8 8.0 - 23.0 mg/dL    Creatinine 1.00 0.67 - 1.17 mg/dL    GFR Estimate 79 >60 mL/min/1.73m2    Calcium 9.0 8.8 - 10.2 mg/dL    Chloride 97 (L) 98 - 107 mmol/L    Glucose 86 70 - 99 mg/dL    Alkaline Phosphatase 107 40 - 150 U/L    AST 24 0 - 45 U/L    ALT 24 0 - 70 U/L    Protein Total 6.8 6.4 - 8.3 g/dL    Albumin 3.6 3.5 - 5.2 g/dL    Bilirubin Total 0.5 <=1.2 mg/dL   Lipase   Result Value Ref Range    Lipase 28 13 - 60 U/L   Procalcitonin   Result Value Ref Range    Procalcitonin 0.07 <0.50 ng/mL   Troponin T, High Sensitivity   Result Value Ref Range    Troponin T, High Sensitivity 47 (H) <=22 ng/L   Magnesium   Result Value Ref Range    Magnesium 1.9 1.7 - 2.3 mg/dL   TSH with free T4 reflex   Result Value Ref Range    TSH 5.24 (H) 0.30 - 4.20 uIU/mL   Nt probnp inpatient (BNP)   Result Value Ref Range    N terminal Pro BNP Inpatient 6,246 (H) 0 - 900 pg/mL   T4 free   Result Value Ref Range    Free T4 1.49 0.90 - 1.70 ng/dL   POC US ECHO LIMITED    Impression    Poor ef, ?small effusion, ?small IVC   CBC with platelets differential *Canceled*    Narrative    The following orders were created for panel order CBC with platelets differential.  Procedure                               Abnormality         Status                     ---------                               -----------         ------                       Please view results for these tests on the individual orders.   Lactic acid whole blood   Result Value Ref Range    Lactic Acid 0.7 0.7 - 2.0 mmol/L   Blood gas venous   Result Value Ref Range    pH Venous 7.39 7.32 - 7.43    pCO2 Venous 49 40 - 50 mm Hg    pO2 Venous 25 25 - 47 mm Hg     Bicarbonate Venous 30 (H) 21 - 28 mmol/L    Base Excess/Deficit 4.2 (H) -7.7 - 1.9 mmol/L    FIO2 0    Asymptomatic Influenza A/B, RSV, & SARS-CoV2 PCR (COVID-19) Nose    Specimen: Nose; Swab   Result Value Ref Range    Influenza A PCR Negative Negative    Influenza B PCR Negative Negative    RSV PCR Negative Negative    SARS CoV2 PCR Negative Negative    Narrative    Testing was performed using the Xpert Xpress CoV2/Flu/RSV Assay on the [employer] GeneXpert Instrument. This test should be ordered for the detection of SARS-CoV-2, influenza, and RSV viruses in individuals who meet clinical and/or epidemiological criteria. Test performance is unknown in asymptomatic patients. This test is for in vitro diagnostic use under the FDA EUA for laboratories certified under CLIA to perform high or moderate complexity testing. This test has not been FDA cleared or approved. A negative result does not rule out the presence of PCR inhibitors in the specimen or target RNA in concentration below the limit of detection for the assay. If only one viral target is positive but coinfection with multiple targets is suspected, the sample should be re-tested with another FDA cleared, approved, or authorized test, if coinfection would change clinical management. This test was validated by the [facility]. These laboratories are certified under the Clinical Laboratory Improvement Amendments of 1988 (CLIA-88) as qualified to perform high complexity laboratory testing.   UA with Microscopic reflex to Culture    Specimen: Urine, NOS   Result Value Ref Range    Color Urine Yellow Colorless, Straw, Light Yellow, Yellow    Appearance Urine Clear Clear    Glucose Urine Negative Negative mg/dL    Bilirubin Urine Negative Negative    Ketones Urine Negative Negative mg/dL    Specific Gravity Urine 1.009 1.003 - 1.035    Blood Urine Negative Negative    pH Urine 5.0 5.0 - 7.0    Protein Albumin Urine Negative Negative mg/dL     Urobilinogen Urine Normal Normal, 2.0 mg/dL    Nitrite Urine Negative Negative    Leukocyte Esterase Urine Negative Negative    RBC Urine 1 <=2 /HPF    WBC Urine <1 <=5 /HPF    Narrative    Urine Culture not indicated   XR Chest 2 Views    Narrative    EXAM: XR CHEST 2 VIEWS  LOCATION: [facility]  DATE: 12/30/2023    INDICATION: Shortness of breath. Low blood pressures.  COMPARISON: 12/20/2023      Impression    IMPRESSION:     Unchanged diffuse interstitial prominence, likely mild interstitial pulmonary edema.    No focal airspace consolidation. Trace left pleural effusion. No right pleural effusion. No pneumothorax.    The cardiomediastinal silhouette is unremarkable. Aortic calcifications.    Multilevel degenerative changes of the spine.   Troponin T, High Sensitivity   Result Value Ref Range    Troponin T, High Sensitivity 43 (H) <=22 ng/L       Medications   lactated ringers BOLUS 250 mL (0 mLs Intravenous Stopped 12/30/23 1644)   magnesium sulfate 2 g in 50 mL sterile water intermittent infusion (0 g Intravenous Stopped 12/30/23 1947)   bumetanide (BUMEX) injection 1 mg (1 mg Intravenous $Given 12/30/23 2024)       Assessments & Plan (with Medical Decision Making)     Right now I think the patient is over diuresed.  He looks dry.  His IVC is slightly flat ultrasound.  He may have had some fluid shifts as well after the pleural effusions were drained.  They may have reaccumulated and this could be causing his shortness of breath.  I will get a chest x-ray.  Has a poor EF but I am going to give him a small amount of fluid and see how he responds.  At this time is heart rate is in the 40s and 50s.  He says he has history of low heart rates.  pulse rate on the day of discharge yesterday was 56.  I will check a TSH.  I will send blood cultures as well.  Will check a urinalysis, CBC, LFTs, BMP, magnesium.  Will check a procalcitonin and COVID swab as well.  The differential is broad for  this patient and I would have a low threshold to escalate care and start antibiotics.    I have reviewed the nursing notes.    I have reviewed the findings, diagnosis, plan and need for follow up with the patient.        Medical Decision Making  The patient's presentation was of high complexity (an acute health issue posing potential threat to life or bodily function).    The patient's evaluation involved:  an assessment requiring an independent historian (see separate area of note for details)  review of external note(s) from 2 sources (see separate area of note for details)  review of 1 test result(s) ordered prior to this encounter (see separate area of note for details)  ordering and/or review of 3+ test(s) in this encounter (see separate area of note for details)  independent interpretation of testing performed by another health professional (see separate area of note for details)    The patient's management necessitated moderate risk (prescription drug management including medications given in the ED) and high risk (a decision regarding hospitalization).        Discharge Medication List as of 12/30/2023  8:31 PM        START taking these medications    Details   bumetanide (BUMEX) 1 MG tablet Take 1 tablet (1 mg) by mouth daily, Disp-30 tablet, R-3, E-Prescribe             Final diagnoses:   Shortness of breath       12/30/2023   [facility] EMERGENCY DEPT       Craig Gale MD  12/31/23 9710

## 2023-12-31 ENCOUNTER — TELEPHONE (OUTPATIENT)
Dept: FAMILY MEDICINE | Facility: CLINIC | Age: 73
End: 2023-12-31
Payer: MEDICARE

## 2023-12-31 NOTE — DISCHARGE INSTRUCTIONS
I think you needed some additional diuresis.  I prescribed a diuretic that I want you to take instead of Lasix.  Can take the Lasix tomorrow and Monday.  I would double the dose.    After that, switch to the Bumex that I prescribed.    Hopefully, this will help your blood pressure stay normal.    Please follow-up with your clinic appointment on Tuesday.    If you feel worse, please come back.

## 2023-12-31 NOTE — TELEPHONE ENCOUNTER
orders    Who is Calling: nurse, [facility]    Update: calling in for orders.     Does caller want a call/response back: Yes.     Resumption of care skilled nursing once a week for two weeks.     Could we send this information to you in [employer] or would you prefer to receive a phone call?:   Patient would prefer a phone call   Okay to leave a detailed message?: Yes at Other phone number:      Call jodi- [phone]

## 2023-12-31 NOTE — ED NOTES
"[facility]   Admission Handoff    The patient is Duane C Johnson, 73 year old who arrived in the ED by CAR from home with a complaint of Shortness of Breath and Hypotension  . The patient's current symptoms are a recurrence of a past episode and during this time the symptoms have remained the same. In the ED, patient was diagnosed with   Final diagnoses:   None         Needed?: No    Allergies:    Allergies   Allergen Reactions    Brilinta [Ticagrelor] Other (See Comments) and Difficulty breathing     Per pt and spouse, hyperventilation.    Clonazepam Other (See Comments)     Per spouse, acted like a zombie and he was shaky, could barely talk.       Past Medical Hx:   Past Medical History:   Diagnosis Date    Benign essential hypertension     CAD (coronary artery disease)     Chronic systolic heart failure (H)     Hypertension     ST elevation myocardial infarction (STEMI), unspecified artery (H)     Ventricular fibrillation (H)        Initial vitals were: BP: (!) 54/37  Pulse: 51  Temp: 98.3  F (36.8  C)  Resp: 20  Height: 167.6 cm (5' 6\")  Weight: 59.9 kg (132 lb)  SpO2: 96 %   Recent vital Signs: /72   Pulse 53   Temp 98.3  F (36.8  C) (Oral)   Resp 12   Ht 1.676 m (5' 6\")   Wt 59.9 kg (132 lb)   SpO2 94%   BMI 21.31 kg/m      Elimination Status: Continent: Yes     Activity Level: SBA    Fall Status: Reason for falls risk: Other- n/a  nonskid shoes/slippers when out of bed    Baseline Mental status: WDL  Current Mental Status changes: at basesline    Infection present or suspected this encounter: no  Sepsis suspected: No    Isolation type: none    Bariatric equipment needed?: No    In the ED these meds were given:   Medications   magnesium sulfate 2 g in 50 mL sterile water intermittent infusion (2 g Intravenous $New Bag 12/30/23 4109)   lactated ringers BOLUS 250 mL (0 mLs Intravenous Stopped 12/30/23 1644)       Drips running?  Yes - mag replacement should be " completed shortly    Home pump  No    Current LDAs: Peripheral IV: Site JAJA; Gauge 18g  none     Results:   Labs/Imaging  Ordered and Resulted from Time of ED Arrival Up to the Time of Departure from the ED  Results for orders placed or performed during the hospital encounter of 12/30/23 (from the past 24 hour(s))   Scott Draw    Narrative    The following orders were created for panel order Scott Draw.  Procedure                               Abnormality         Status                     ---------                               -----------         ------                     Extra Blue Top Tube[[id number]]                              Final result               Extra Red Top Tube[[id number]]                               Final result               Extra Green Top (Lithium...[[id number]]                      Final result               Extra Blood Bank Purple ...[[id number]]                      Final result                 Please view results for these tests on the individual orders.   Extra Blue Top Tube   Result Value Ref Range    Hold Specimen JIC    Extra Red Top Tube   Result Value Ref Range    Hold Specimen JIC    Extra Green Top (Lithium Heparin) Tube   Result Value Ref Range    Hold Specimen JIC    Extra Blood Bank Purple Top Tube   Result Value Ref Range    Hold Specimen JIC    Comprehensive metabolic panel   Result Value Ref Range    Sodium 134 (L) 135 - 145 mmol/L    Potassium 3.7 3.4 - 5.3 mmol/L    Carbon Dioxide (CO2) 27 22 - 29 mmol/L    Anion Gap 10 7 - 15 mmol/L    Urea Nitrogen 13.8 8.0 - 23.0 mg/dL    Creatinine 1.00 0.67 - 1.17 mg/dL    GFR Estimate 79 >60 mL/min/1.73m2    Calcium 9.0 8.8 - 10.2 mg/dL    Chloride 97 (L) 98 - 107 mmol/L    Glucose 86 70 - 99 mg/dL    Alkaline Phosphatase 107 40 - 150 U/L    AST 24 0 - 45 U/L    ALT 24 0 - 70 U/L    Protein Total 6.8 6.4 - 8.3 g/dL    Albumin 3.6 3.5 - 5.2 g/dL    Bilirubin Total 0.5 <=1.2 mg/dL   Lipase   Result Value Ref Range    Lipase 28 13 - 60  U/L   Procalcitonin   Result Value Ref Range    Procalcitonin 0.07 <0.50 ng/mL   Troponin T, High Sensitivity   Result Value Ref Range    Troponin T, High Sensitivity 47 (H) <=22 ng/L   Magnesium   Result Value Ref Range    Magnesium 1.9 1.7 - 2.3 mg/dL   TSH with free T4 reflex   Result Value Ref Range    TSH 5.24 (H) 0.30 - 4.20 uIU/mL   Nt probnp inpatient (BNP)   Result Value Ref Range    N terminal Pro BNP Inpatient 6,246 (H) 0 - 900 pg/mL   T4 free   Result Value Ref Range    Free T4 1.49 0.90 - 1.70 ng/dL   POC US ECHO LIMITED    Impression    Poor ef, ?small effusion, ?small IVC   Lactic acid whole blood   Result Value Ref Range    Lactic Acid 0.7 0.7 - 2.0 mmol/L   Blood gas venous   Result Value Ref Range    pH Venous 7.39 7.32 - 7.43    pCO2 Venous 49 40 - 50 mm Hg    pO2 Venous 25 25 - 47 mm Hg    Bicarbonate Venous 30 (H) 21 - 28 mmol/L    Base Excess/Deficit 4.2 (H) -7.7 - 1.9 mmol/L    FIO2 0    Asymptomatic Influenza A/B, RSV, & SARS-CoV2 PCR (COVID-19) Nose    Specimen: Nose; Swab   Result Value Ref Range    Influenza A PCR Negative Negative    Influenza B PCR Negative Negative    RSV PCR Negative Negative    SARS CoV2 PCR Negative Negative    Narrative    Testing was performed using the Xpert Xpress CoV2/Flu/RSV Assay on the [employer] GeneXpert Instrument. This test should be ordered for the detection of SARS-CoV-2, influenza, and RSV viruses in individuals who meet clinical and/or epidemiological criteria. Test performance is unknown in asymptomatic patients. This test is for in vitro diagnostic use under the FDA EUA for laboratories certified under CLIA to perform high or moderate complexity testing. This test has not been FDA cleared or approved. A negative result does not rule out the presence of PCR inhibitors in the specimen or target RNA in concentration below the limit of detection for the assay. If only one viral target is positive but coinfection with multiple targets is suspected, the  sample should be re-tested with another FDA cleared, approved, or authorized test, if coinfection would change clinical management. This test was validated by the [facility]. These laboratories are certified under the Clinical Laboratory Improvement Amendments of 1988 (CLIA-88) as qualified to perform high complexity laboratory testing.   UA with Microscopic reflex to Culture    Specimen: Urine, NOS   Result Value Ref Range    Color Urine Yellow Colorless, Straw, Light Yellow, Yellow    Appearance Urine Clear Clear    Glucose Urine Negative Negative mg/dL    Bilirubin Urine Negative Negative    Ketones Urine Negative Negative mg/dL    Specific Gravity Urine 1.009 1.003 - 1.035    Blood Urine Negative Negative    pH Urine 5.0 5.0 - 7.0    Protein Albumin Urine Negative Negative mg/dL    Urobilinogen Urine Normal Normal, 2.0 mg/dL    Nitrite Urine Negative Negative    Leukocyte Esterase Urine Negative Negative    RBC Urine 1 <=2 /HPF    WBC Urine <1 <=5 /HPF    Narrative    Urine Culture not indicated       For the majority of the shift this patient's behavior was Green     Cardiac Rhythm: Bradycardia  Pt needs tele? Yes  Skin/wound Issues: None    Code Status: Full Code    Pain control: good    Nausea control: good    Abnormal labs/tests/findings requiring intervention:     Patient tested for COVID 19 prior to admission: YES     OBS brochure/video discussed/provided to patient/family: No     Family present during ED course? Yes     Family Comments/Social Situation comments: spouse pleasant, at bedside    Tasks needing completion: None    Deana Moreno, RN

## 2024-01-02 ENCOUNTER — PATIENT OUTREACH (OUTPATIENT)
Dept: CARE COORDINATION | Facility: CLINIC | Age: 74
End: 2024-01-02

## 2024-01-02 ENCOUNTER — TELEPHONE (OUTPATIENT)
Dept: FAMILY MEDICINE | Facility: CLINIC | Age: 74
End: 2024-01-02

## 2024-01-02 ENCOUNTER — OFFICE VISIT (OUTPATIENT)
Dept: FAMILY MEDICINE | Facility: CLINIC | Age: 74
End: 2024-01-02
Payer: MEDICARE

## 2024-01-02 VITALS
WEIGHT: 136 LBS | RESPIRATION RATE: 20 BRPM | HEIGHT: 67 IN | TEMPERATURE: 97.7 F | SYSTOLIC BLOOD PRESSURE: 102 MMHG | BODY MASS INDEX: 21.35 KG/M2 | OXYGEN SATURATION: 93 % | HEART RATE: 54 BPM | DIASTOLIC BLOOD PRESSURE: 64 MMHG

## 2024-01-02 DIAGNOSIS — R06.02 SOB (SHORTNESS OF BREATH): ICD-10-CM

## 2024-01-02 DIAGNOSIS — I95.9 TRANSIENT HYPOTENSION: ICD-10-CM

## 2024-01-02 DIAGNOSIS — I50.20 HEART FAILURE WITH REDUCED EJECTION FRACTION, NYHA CLASS I (H): ICD-10-CM

## 2024-01-02 DIAGNOSIS — F41.9 ANXIETY: ICD-10-CM

## 2024-01-02 DIAGNOSIS — J90 BILATERAL PLEURAL EFFUSION: ICD-10-CM

## 2024-01-02 DIAGNOSIS — Z09 HOSPITAL DISCHARGE FOLLOW-UP: Primary | ICD-10-CM

## 2024-01-02 PROBLEM — J96.01 ACUTE RESPIRATORY FAILURE WITH HYPOXIA (H): Status: RESOLVED | Noted: 2023-11-07 | Resolved: 2024-01-02

## 2024-01-02 PROBLEM — I25.119 ATHEROSCLEROSIS OF NATIVE CORONARY ARTERY OF NATIVE HEART WITH ANGINA PECTORIS (H): Status: ACTIVE | Noted: 2024-01-02

## 2024-01-02 PROCEDURE — 99495 TRANSJ CARE MGMT MOD F2F 14D: CPT | Performed by: FAMILY MEDICINE

## 2024-01-02 RX ORDER — RESPIRATORY SYNCYTIAL VIRUS VACCINE 120MCG/0.5
0.5 KIT INTRAMUSCULAR ONCE
Qty: 1 EACH | Refills: 0 | Status: CANCELLED | OUTPATIENT
Start: 2024-01-02 | End: 2024-01-02

## 2024-01-02 RX ORDER — ESCITALOPRAM OXALATE 10 MG/1
10 TABLET ORAL DAILY
Qty: 90 TABLET | Refills: 0 | Status: SHIPPED | OUTPATIENT
Start: 2024-01-02 | End: 2024-02-02

## 2024-01-02 ASSESSMENT — PAIN SCALES - GENERAL: PAINLEVEL: NO PAIN (0)

## 2024-01-02 ASSESSMENT — ACTIVITIES OF DAILY LIVING (ADL): DEPENDENT_IADLS:: INDEPENDENT

## 2024-01-02 NOTE — TELEPHONE ENCOUNTER
Noted - thank you!    Sindy Akins, PharmD, [facility]  Medication Therapy Management Pharmacist

## 2024-01-02 NOTE — TELEPHONE ENCOUNTER
Joya from [facility] is calling to check if provider responded to phone encounter.   Paulette can be reached at [phone].    Will route to Dr. Amaral.    Jacqui Dyson RN

## 2024-01-02 NOTE — PROGRESS NOTES
Clinic Care Coordination Contact    Situation: Patient chart reviewed by care coordinator.    Background: 12/30/2023   ED visit SOB    Assessment: Patient has a follow up with PCP today    Plan/Recommendations: CC RN will follow up in 1-2 business days     [facility]   Ruthy Paiz RN, Care Coordinator   [facility]'s   E-mail [email or web address]   [phone]

## 2024-01-02 NOTE — PROGRESS NOTES
Assessment & Plan     Hospital discharge follow-up  improved    Transient hypotension  Improved, they will continue to monitor blood pressures    Heart failure with reduced ejection fraction, NYHA class I (H)  Followed by [facility]  New referral given for cardiac rehab  - Cardiac Rehab Referral; Future    Bilateral pleural effusion  S/P thoracentesis. Will  Bumex planned  Will monitor    Anxiety  We had a discussion about his anxiety, he doesn't think her really is anxious, just when he can't breath  Uses hydroxyzine prn. We decided to keep on the Lexapro for now  - escitalopram (LEXAPRO) 10 MG tablet; Take 1 tablet (10 mg) by mouth daily    SOB (shortness of breath)  improved  - Cardiac Rehab Referral; Future    Patient Instructions   Ok to resume cardiac rehab    Follow up with cardiology as planned            MED REC REQUIRED  Post Medication Reconciliation Status: discharge medications reconciled, continue medications without change      Jacqui Bahena MD  [facility]    Subjective Duane is a 73 year old, presenting for the following health issues:  Hospital F/U        1/2/2024     8:56 AM   Additional Questions   Roomed by Jacque   Accompanied by wife - Melissa       \[facility]\""       Hospital Follow-up Visit:    Hospital/Nursing Home/IP Rehab Facility: [facility]  Date of Admission: 12/25/2023  Date of Discharge: 12/28/2023  Reason(s) for Admission: SOB  Orthopnea/PND  Bilateral Pleural Effusions  Acute Decompensated Heart Failure    Was your hospitalization related to COVID-19? No   Problems taking medications regularly:  None  Medication changes since discharge: None  Problems adhering to non-medication therapy:  None    Summary of hospitalization:  [facility] discharge summary reviewed  Diagnostic Tests/Treatments reviewed.  Follow up needed: BMP (done in emergency department a few days later)  Other Healthcare Providers Involved in  Patient s Care:         Specialist appointment - cardiology and cardiac rehab  Update since discharge: improved.         Plan of care communicated with patient and family         From the Hospital note:   Duane C Johnson is a 73 year old male admitted on 12/25/2023. He Has past medical history significant for recent NSTEMI status post BRENDA on dual therapy (Eliquis plus Plavix) was complicated by cardiac arrest.  He presents with shortness of breath onset since October when he had his coronary event and has progressively gotten worse. His initial dyspnea was resolved secondary to Brillinta usage, but has recurred since with persistent bilateral pleural effusions and HFrEF. This is the patient's 3rd ER visit for similar complaints. Initial workup revealed pleural effusions patient was admitted for diuresis for presumable acute decompensated heart failure.     CAD  History of AMI status post BRENDA (10/2023)  History of cardiac arrest (VT/VF arrest around time of heart attack)  [facility] (10/26/2023): BRENDA of proximal to mid LAD.  100% distal LAD lesion, 80% first diagonal, 70% RPDA, 40% distal RCA  TTE: LVEF 30%  [facility] (11/3/2023): Enlarged LV with wall thinning and akinesis (LVEF 28%).  LGE in LAD territory consistent with large infarction.  Bilateral pleural effusions.  Consistent with ischemic cardiomyopathy and severely reduced LVEF.  Home meds: Plavix and Eliquis, Lipitor 80 mg, metoprolol 12.5 mg daily (Toprol-XL), Amiodarone    Patient on Eliquis due to large area of akinesis in the LAD territory (presumably to prevent LV thrombus formation)    He had thoracentesis 2 liters removed  He went back in to the emergency department 12/30/23 with shortness of breath. His blood pressure was low at 54/37  Thought maybe from all the fluid shifts.   They started Bumex to help with diuresis instead of lasix, but was not in stock, and magnesium was not either, so will start today  Has apt with cardiology 1/10/23  Goes to [facility]  He does  "cardiac rehab MWF, they had stopped it due to his hospitalization, and will need to have a doctors ok to resume.     Brilinta causes worsening of his shortness of breath and it was stopped. Got worse again, but much improved after bilateral thoracentesis     He feels better, still some shortness of breath. Feels like limited amount of volume exchange.       Review of Systems   shortness of breath better        Objective    /64 (BP Location: Right arm)   Pulse 54   Temp 97.7  F (36.5  C) (Tympanic)   Resp 20   Ht 1.702 m (5' 7\")   Wt 61.7 kg (136 lb)   SpO2 93%   BMI 21.30 kg/m    Body mass index is 21.3 kg/m .  Physical Exam   GENERAL: thin, pleasant white male, alert and no distress  NECK: no adenopathy, masses, and thyroid normal to palpation  RESP: lungs clear to auscultation with reduced breath sounds - no rales, rhonchi or wheezes  CV: regular rate and rhythm, normal S1 S2, no S3 or S4, no murmur, click or rub  ABDOMEN: soft, nontender, no hepatosplenomegaly, no masses and bowel sounds normal  MS: no gross musculoskeletal defects noted, no edema                  "

## 2024-01-02 NOTE — TELEPHONE ENCOUNTER
MTM referral from: Transitions of Care (recent hospital discharge or ED visit)    MTM referral outreach attempt #1 on January 2, 2024 at 11:32 AM      Outcome: Patient is not interested at this time because he just went over all his meds with his pcp, will route to MTM Pharmacist/Provider as an FYI. Thank you for the referral.     Use vbc ethan for the carrier/Plan on the flowsheet          Nanette Cabral CPhT  MTM

## 2024-01-03 ENCOUNTER — HOSPITAL ENCOUNTER (OUTPATIENT)
Dept: CARDIAC REHAB | Facility: CLINIC | Age: 74
Discharge: HOME | End: 2024-01-03
Attending: INTERNAL MEDICINE
Payer: MEDICARE

## 2024-01-03 DIAGNOSIS — I50.20 HEART FAILURE WITH REDUCED EJECTION FRACTION, NYHA CLASS I (H): Primary | ICD-10-CM

## 2024-01-03 PROCEDURE — 93798 PHYS/QHP OP CAR RHAB W/ECG: CPT

## 2024-01-03 ASSESSMENT — ACTIVITIES OF DAILY LIVING (ADL): DEPENDENT_IADLS:: INDEPENDENT

## 2024-01-03 NOTE — PROGRESS NOTES
Clinic Care Coordination Contact  Clinic Care Coordination Contact  OUTREACH    Referral Information:  Referral Source: IP Handoff    Primary Diagnosis: CHF    Chief Complaint   Patient presents with    Chart Review Please     Clinic Care Coordination RN     Clinic Care Coordination - Post Hospital     Clinic Care Coordination RN         Universal Utilization: 12/30/2023   ED visit SOB   Clinic Utilization  Difficulty keeping appointments:: No  Compliance Concerns: No  No-Show Concerns: No  No PCP office visit in Past Year: No  Utilization      No Show Count (past year)  1             ED Visits  8             Hospital Admissions  5                    Current as of: 1/3/2024  8:03 AM                Clinical Concerns:  Current Medical Concerns:  Patient had a hospital follow up with PCP yesterday.  A wedge pillow was recommended.  Patient usually uses a bedroll for a pillow and so it will take sometime to get adjusted to a Wedge.   Patient has Cardiac Rehabilitation for the next 3 weeks     Current Behavioral Concerns: No    Education Provided to patient: Call with increased symptoms of concern   Pain  Pain (GOAL):: No  Health Maintenance Reviewed: Not assessed  Clinical Pathway: None    Medication Management:  Medication review status: Medications reviewed and no changes reported per patient.             Functional Status:  Dependent ADLs:: Independent  Dependent IADLs:: Independent  Bed or wheelchair confined:: No  Mobility Status: Independent    Living Situation:  Current living arrangement:: I live in a private home with spouse    Lifestyle & Psychosocial Needs:    Social Determinants of Health     Food Insecurity: Low Risk  (1/2/2024)    Food Insecurity     Within the past 12 months, did you worry that your food would run out before you got money to buy more?: No     Within the past 12 months, did the food you bought just not last and you didn t have money to get more?: No   Depression: Not at risk (1/2/2024)     PHQ-2     PHQ-2 Score: 0   Housing Stability: Low Risk  (1/2/2024)    Housing Stability     Do you have housing? : Yes     Are you worried about losing your housing?: No   Tobacco Use: Medium Risk (1/2/2024)    Patient History     Smoking Tobacco Use: Former     Smokeless Tobacco Use: Never     Passive Exposure: Current   Financial Resource Strain: Low Risk  (1/2/2024)    Financial Resource Strain     Within the past 12 months, have you or your family members you live with been unable to get utilities (heat, electricity) when it was really needed?: No   Alcohol Use: Not on file   Transportation Needs: Low Risk  (1/2/2024)    Transportation Needs     Within the past 12 months, has lack of transportation kept you from medical appointments, getting your medicines, non-medical meetings or appointments, work, or from getting things that you need?: No   Physical Activity: Inactive (11/16/2023)    Exercise Vital Sign     Days of Exercise per Week: 0 days     Minutes of Exercise per Session: 0 min   Interpersonal Safety: Low Risk  (10/9/2023)    Interpersonal Safety     Do you feel physically and emotionally safe where you currently live?: Yes     Within the past 12 months, have you been hit, slapped, kicked or otherwise physically hurt by someone?: No     Within the past 12 months, have you been humiliated or emotionally abused in other ways by your partner or ex-partner?: No   Stress: Not on file   Social Connections: Unknown (11/16/2023)    Social Connection and Isolation Panel [NHANES]     Frequency of Communication with Friends and Family: Not on file     Frequency of Social Gatherings with Friends and Family: Not on file     Attends Christian Services: Not on file     Active Member of Clubs or Organizations: Not on file     Attends Club or Organization Meetings: Not on file     Marital Status:      Diet:: No added salt  Inadequate nutrition (GOAL):: No  Tube Feeding: No  Inadequate activity/exercise (GOAL)::  No  Significant changes in sleep pattern (GOAL): No  Transportation means:: Family     Yazidism or spiritual beliefs that impact treatment:: No  Mental health DX:: Yes  Mental health DX how managed:: Medication  Mental health management concern (GOAL):: No  Chemical Dependency Status: No Current Concerns  Informal Support system:: Spouse             Resources and Interventions:  Current Resources:   Skilled Home Care Services: Skilled Nursing, Home Health Aid, Physical Therapy, Occupational Therapy  Community Resources: Home Care  Supplies Currently Used at Home: None               Advance Care Plan/Directive  Advanced Care Plans/Directives on file:: No    Referrals Placed: None         Care Plan:  Care Plan: Heart Failure       Problem: Heart Failure Management Needs Improvement       Goal: Demonstrate improved heart failure management       Start Date: 11/16/2023 Expected End Date: 2/23/2024    This Visit's Progress: 60% Recent Progress: 60%    Priority: High    Note:     Barriers: Deconditioned   Strengths: supportive wife   Patient expressed understanding of goal: Yes   Action steps to achieve this goal:  1. I will check my weight daily and call if weight gain is 2-3# in a day or 5# in a week,increased shortness of breath episodes or leg swelling   2. I will take my medications as directed and keep future provider appointments   3. I will keep future Cardiac Rehab appointments   4. I will continue home care services                                Patient/Caregiver understanding: Patient expresses good understanding of discharge instructions     Outreach Frequency: 2 weeks, more frequently as needed  Future Appointments                Today [place] CARD RHB 1 M [facility] Cardiac and Pulmonary Rehabilitation [facility]    In 2 days [place] CARD RHB 1 M [facility] Cardiac and Pulmonary Rehabilitation [facility]    In 5 days [place] CARD RHB 1 M [facility] Cardiac and Pulmonary Rehabilitation  [place], [place]    In 1 week UC LAB M [facility] Lab [facility]    In 1 week [place] CARD RHB 1 M [facility] Cardiac and Pulmonary Rehabilitation [place], [place] [facility]    In 1 week Sirisha Arias APRN CNP M [facility]    In 1 week [place] CARD RHB 1 M [facility] [place] [facility]    In 1 week [place] CARD RHB 1 M [facility] [place] [facility]    In 2 weeks [place] CARD RHB 1 M [facility] Cardiac and Pulmonary Rehabilitation [place], [place] [facility]    In 2 weeks [place] CARD RHB 1 M [facility] Cardiac and Pulmonary Rehabilitation [place], [place] [facility]    In 2 weeks [place] CARD RHB 1 M [facility] Cardiac and Pulmonary Rehabilitation [place], [place] [facility]    In 3 weeks [place] CARD RHB 1 M [facility] Cardiac and Pulmonary Rehabilitation [place], [place] [facility]    In 3 weeks Blas Causey MD M [facility] [place],  [place]    In 3 weeks [place] CARD RHB 1 M [facility] Cardiac and Pulmonary Rehabilitation [place], [place] [facility]    In 3 weeks [place] CARD RHB 1 M [facility] Cardiac and Pulmonary Rehabilitation [place], [place] [facility]    In 4 weeks [place] CARD RHB 1 M [facility] Cardiac and Pulmonary Rehabilitation [place], [place] [facility]    In 1 month [place] CARD RHB 1 M [facility] [place] [facility]    In 1 month [place] CARD RHB 1 M [facility] Cardiac and Pulmonary Rehabilitation [place], [place] [facility]    In 1 month [place] CARD RHB 1 M [facility] Cardiac and Pulmonary Rehabilitation [place], [place] [facility]    In 1 month [place] CARD RHB 1 M [facility] Cardiac and Pulmonary Rehabilitation [place], [place] [facility]    In 1 month [place] CARD RHB 1 M [facility] Cardiac and Pulmonary Rehabilitation [place], [place] [facility]    In 1 month [place] CARD RHB 1 M [facility] Cardiac and Pulmonary Rehabilitation [place], [place] [facility]     In 1 month  CRITICAL CARE [facility]    In 2 months Steve Dixon DO [facility]    In 3 months Sedrick Lehman, PhD [facility] Neuropsychology [facility]            Plan:   Patient will keep future Cardiac Rehabilitation appointments   CC RN will follow up in 1-2 weeks   [facility]   Ruthy Paiz RN, Care Coordinator   [facility]'s   E-mail [email or web address]   [phone]

## 2024-01-05 ENCOUNTER — HOSPITAL ENCOUNTER (OUTPATIENT)
Dept: CARDIAC REHAB | Facility: CLINIC | Age: 74
Discharge: HOME | End: 2024-01-05
Attending: INTERNAL MEDICINE
Payer: MEDICARE

## 2024-01-05 PROCEDURE — 93798 PHYS/QHP OP CAR RHAB W/ECG: CPT | Performed by: REHABILITATION PRACTITIONER

## 2024-01-08 ENCOUNTER — HOSPITAL ENCOUNTER (OUTPATIENT)
Dept: CARDIAC REHAB | Facility: CLINIC | Age: 74
Discharge: HOME | End: 2024-01-08
Attending: INTERNAL MEDICINE
Payer: MEDICARE

## 2024-01-08 PROCEDURE — 93798 PHYS/QHP OP CAR RHAB W/ECG: CPT

## 2024-01-10 ENCOUNTER — LAB (OUTPATIENT)
Dept: LAB | Facility: CLINIC | Age: 74
End: 2024-01-10
Attending: NURSE PRACTITIONER
Payer: MEDICARE

## 2024-01-10 ENCOUNTER — OFFICE VISIT (OUTPATIENT)
Dept: CARDIOLOGY | Facility: CLINIC | Age: 74
End: 2024-01-10
Attending: NURSE PRACTITIONER
Payer: MEDICARE

## 2024-01-10 VITALS
HEART RATE: 61 BPM | SYSTOLIC BLOOD PRESSURE: 103 MMHG | BODY MASS INDEX: 21.07 KG/M2 | WEIGHT: 134.5 LBS | OXYGEN SATURATION: 95 % | DIASTOLIC BLOOD PRESSURE: 69 MMHG

## 2024-01-10 DIAGNOSIS — I50.20 HEART FAILURE WITH REDUCED EJECTION FRACTION, NYHA CLASS I (H): ICD-10-CM

## 2024-01-10 DIAGNOSIS — I50.21 ACUTE SYSTOLIC HEART FAILURE (H): ICD-10-CM

## 2024-01-10 DIAGNOSIS — I46.9 CARDIAC ARREST (H): ICD-10-CM

## 2024-01-10 LAB
ANION GAP SERPL CALCULATED.3IONS-SCNC: 8 MMOL/L (ref 7–15)
BUN SERPL-MCNC: 7.8 MG/DL (ref 8–23)
CALCIUM SERPL-MCNC: 9.1 MG/DL (ref 8.8–10.2)
CHLORIDE SERPL-SCNC: 94 MMOL/L (ref 98–107)
CREAT SERPL-MCNC: 0.95 MG/DL (ref 0.67–1.17)
DEPRECATED HCO3 PLAS-SCNC: 32 MMOL/L (ref 22–29)
EGFRCR SERPLBLD CKD-EPI 2021: 85 ML/MIN/1.73M2
GLUCOSE SERPL-MCNC: 92 MG/DL (ref 70–99)
NT-PROBNP SERPL-MCNC: 4886 PG/ML (ref 0–900)
POTASSIUM SERPL-SCNC: 4 MMOL/L (ref 3.4–5.3)
SODIUM SERPL-SCNC: 134 MMOL/L (ref 135–145)

## 2024-01-10 PROCEDURE — 99214 OFFICE O/P EST MOD 30 MIN: CPT | Performed by: NURSE PRACTITIONER

## 2024-01-10 PROCEDURE — 36415 COLL VENOUS BLD VENIPUNCTURE: CPT | Performed by: PATHOLOGY

## 2024-01-10 PROCEDURE — 80048 BASIC METABOLIC PNL TOTAL CA: CPT | Performed by: PATHOLOGY

## 2024-01-10 PROCEDURE — 83880 ASSAY OF NATRIURETIC PEPTIDE: CPT | Performed by: PATHOLOGY

## 2024-01-10 PROCEDURE — G0463 HOSPITAL OUTPT CLINIC VISIT: HCPCS | Performed by: NURSE PRACTITIONER

## 2024-01-10 RX ORDER — LISINOPRIL 2.5 MG/1
2.5 TABLET ORAL DAILY
Qty: 30 TABLET | Refills: 11 | Status: SHIPPED | OUTPATIENT
Start: 2024-01-10 | End: 2024-01-15

## 2024-01-10 ASSESSMENT — PAIN SCALES - GENERAL: PAINLEVEL: NO PAIN (0)

## 2024-01-10 NOTE — NURSING NOTE
Chief Complaint   Patient presents with    Follow Up     [facility] Return for HF with severely reduced EF (25-30%) for medical management of medications with labs prior     Vitals were taken and medications reconciled.    Raymond Velasquez, EMT  3:04 PM

## 2024-01-10 NOTE — NURSING NOTE
New Medication:  -Stop losartan  -Start lisinopril 2.5 mg daily, hold for systolic BP < 100  -Resume metoprolol 12.5 mg at bedtime   Patient was educated regarding newly prescribed medication, including discussion of  the indication, administration, side effects, and when to report to MD or RN. Patient demonstrated understanding of this information and agreed to call with further questions or concerns.    Return Appointment:   -Referral to Heart Failure Cardiology   -[facility] in ~ 4 weeks   Patient given instructions regarding scheduling next clinic visit. Patient demonstrated understanding of this information and agreed to call with further questions or concerns.    Patient stated he understood all health information given and agreed to call with further questions or concerns.     Eduarda Jeronimo RN

## 2024-01-10 NOTE — PROGRESS NOTES
[facility] Cardiology   [facility]      HPI:   Mr. Aguila is a 73 year old male with medical history pertinent for anterior STEMI s/p PCI to the pLAD on 10/26/23 with a VT/VF arrest the next day (10/27) with patent stents and unchanged anatomy on repeat angiogram. Placed on amiodarone and discharged 11/03/23, ICD was not indicated as this was within 48h of his MI. His EF prior do discharge was 20-30% with a large area of akinesis in the LAD placed on apixaban due to this. He presents to [facility] for routine follow up.      Mr. Aguila was readmitted 11/6-11/20/23 with ADHF and treated with IV lasix. He had some lightheadedness and his metoprolol dose was adjusted. Brillinta changed to the plavix due to SOB. Seen in the ED on 11/22/23 for hypotension with BPs 80s/60s. Holding lasix, metoprolol, and losartan. Of note, he was asymptomatic with hypotension.     At initial [facility] visit in 11/2023, reported feeling well. Had been frequently holding losartan and metoprolol because of prescribed hold parameters. While BPs were lower with systolic pressures in the 90s-100s, he was asymptomatic. We liberalized hold parameters in order to allow for more consistency with taking GDMT.      Since our initial visit, Mr. Aguila was seen in the ED on 12/20 for chest pain with unremarkable work-up, and then admitted 12/25-12/28 for shortness of breath with bilateral pleural effusions s/p thoracentesis and ADHF exacerbation. He returned to the ED on 12/30 with SOB and was IV diuresed with bumex and his outpatient regimen was switched to PO bumex. He was instructed to hold losartan and metoprolol for hypotension.     Today, Mr. Aguila presents to clinic with his wife. He reports feeling well. Since hospital discharge he has been gradually increasing his activity. [place] and takes daily walks. He has also been attending cardiac rehab. He finds that he is able to exercise for longer periods of time and is recovering more  quickly. Denies CP, palpitations, lightheadedness, syncope. Denies orthopnea, PND, LE edema. Breathing has been much improve since thora and denies any acute SOB. DIOP is in proportion to exertion. He has continued to hold losartan and lisinopril.    Wife closely monitors sodium and fluid intake. They have been adhering to a 1.5 L FR and 1500 mg sodium restriction.     Monitors daily BPs, /60s, HR 50-65  Holding Metoprolol for SBP < 90  Weight 138-140 lb    Cardiac Medications   - Amiodarone 200 mg daily   - Eliquis 5 mg BID  - Atorvastatin 80 mg daily   - Plavix 75 mg daily   - Bumex 1 mg daily   - Losartan 12.5 mg daily  - Metoprolol succinate 12.5 mg daily      PAST MEDICAL HISTORY:  Past Medical History:   Diagnosis Date    Benign essential hypertension     CAD (coronary artery disease)     Chronic systolic heart failure (H)     Hypertension     ST elevation myocardial infarction (STEMI), unspecified artery (H)     Ventricular fibrillation (H)        FAMILY HISTORY:  Family History   Problem Relation Age of Onset    Other Cancer Mother     Obesity Mother     Cervical Cancer Sister     GI problems Father     Other Cancer Sister        SOCIAL HISTORY:  Social History     Socioeconomic History    Marital status:      Spouse name: None    Number of children: None    Years of education: None    Highest education level: None   Tobacco Use    Smoking status: Former     Packs/day: .25     Types: Cigarettes     Passive exposure: Current    Smokeless tobacco: Never    Tobacco comments:     Quit 10/26/2023   Vaping Use    Vaping Use: Never used   Substance and Sexual Activity    Alcohol use: Yes     Comment: occas    Drug use: No    Sexual activity: Yes     Partners: Female     Birth control/protection: None   Other Topics Concern    Parent/sibling w/ CABG, MI or angioplasty before 65F 55M? No     Social Determinants of Health     Financial Resource Strain: Low Risk  (11/23/2023)    Financial Resource Strain      Within the past 12 months, have you or your family members you live with been unable to get utilities (heat, electricity) when it was really needed?: No   Food Insecurity: Low Risk  (11/23/2023)    Food Insecurity     Within the past 12 months, did you worry that your food would run out before you got money to buy more?: No     Within the past 12 months, did the food you bought just not last and you didn t have money to get more?: No   Transportation Needs: Low Risk  (11/23/2023)    Transportation Needs     Within the past 12 months, has lack of transportation kept you from medical appointments, getting your medicines, non-medical meetings or appointments, work, or from getting things that you need?: No   Physical Activity: Inactive (11/16/2023)    Exercise Vital Sign     Days of Exercise per Week: 0 days     Minutes of Exercise per Session: 0 min   Social Connections: Unknown (11/16/2023)    Social Connection and Isolation Panel [NHANES]     Marital Status:    Interpersonal Safety: Low Risk  (10/9/2023)    Interpersonal Safety     Do you feel physically and emotionally safe where you currently live?: Yes     Within the past 12 months, have you been hit, slapped, kicked or otherwise physically hurt by someone?: No     Within the past 12 months, have you been humiliated or emotionally abused in other ways by your partner or ex-partner?: No   Housing Stability: Low Risk  (11/23/2023)    Housing Stability     Do you have housing? : Yes     Are you worried about losing your housing?: No       CURRENT MEDICATIONS:  acetaminophen (TYLENOL) 325 MG tablet, Take 650 mg by mouth every 6 hours as needed  amiodarone (PACERONE) 200 MG tablet, Take 1 tablet (200 mg) by mouth daily  apixaban ANTICOAGULANT (ELIQUIS) 5 MG tablet, Take 1 tablet (5 mg) by mouth 2 times daily  atorvastatin (LIPITOR) 80 MG tablet, Take 1 tablet (80 mg) by mouth daily  bumetanide (BUMEX) 1 MG tablet, Take 1 tablet (1 mg) by mouth  daily  cetirizine (ZYRTEC) 10 MG tablet, Take 10 mg by mouth daily  clopidogrel (PLAVIX) 75 MG tablet, Take 1 tablet (75 mg) by mouth daily  escitalopram (LEXAPRO) 10 MG tablet, Take 1 tablet (10 mg) by mouth daily  fish oil-omega-3 fatty acids 1000 MG capsule, Take 1 g by mouth 2 times daily Also contains another supplement  furosemide (LASIX) 20 MG tablet, Take 1 tablet (20 mg) by mouth daily  hydrOXYzine HCl (ATARAX) 25 MG tablet, Take 1 tablet (25 mg) by mouth every 8 hours as needed for anxiety  losartan (COZAAR) 25 MG tablet, Take 0.5 tablets (12.5 mg) by mouth daily  magnesium oxide (MAG-OX) 400 MG tablet, Take 1 tablet (400 mg) by mouth daily for 30 days  melatonin 5 MG tablet, Take 1-2 tablets (5-10 mg) by mouth at bedtime  metoprolol succinate ER (TOPROL XL) 25 MG 24 hr tablet, Take 0.5 tablets (12.5 mg) by mouth daily  multivitamin w/minerals (THERA-VIT-M) tablet, Take 1 tablet by mouth daily  traZODone (DESYREL) 50 MG tablet, Take 25 mg by mouth nightly as needed for sleep (Patient not taking: Reported on 1/2/2024)  vitamin C (ASCORBIC ACID) 1000 MG TABS, Take 1,000 mg by mouth daily    No current facility-administered medications on file prior to visit.      ROS:   Refer to HPI    EXAM:  /69 (BP Location: Right arm, Patient Position: Chair, Cuff Size: Adult Small)   Pulse 61   Wt 61 kg (134 lb 8 oz)   SpO2 95%   BMI 21.07 kg/m       GENERAL: Appears comfortable, in no acute distress.   HEENT: Eye symmetrical, no discharge or icterus bilaterally. Mucous membranes moist and without lesions.  CV: RRR, +S1S2, no murmur, rub, or gallop. JVP < 6cm.   RESPIRATORY: Respirations regular, even, and unlabored. Lungs CTA throughout.   GI: Soft and non distended with normoactive bowel sounds present in all quadrants. No tenderness, rebound, guarding. No hepatomegaly.   EXTREMITIES: no peripheral edema. 2+ bilateral pedal pulses.   NEUROLOGIC: Alert and oriented x 3. No focal deficits.   MUSCULOSKELETAL: No  joint swelling or tenderness.   SKIN: No jaundice. No rashes or lesions.     Labs, reviewed with patient in clinic today:  CBC RESULTS:  Lab Results   Component Value Date    WBC 8.0 12/28/2023    WBC 12.2 (H) 05/26/2021    RBC 3.99 (L) 12/28/2023    RBC 3.60 (L) 05/26/2021    HGB 12.4 (L) 12/28/2023    HGB 12.0 (L) 05/26/2021    HCT 36.5 (L) 12/28/2023    HCT 34.8 (L) 05/26/2021    MCV 92 12/28/2023    MCV 97 05/26/2021    MCH 31.1 12/28/2023    MCH 33.3 (H) 05/26/2021    MCHC 34.0 12/28/2023    MCHC 34.5 05/26/2021    RDW 13.2 12/28/2023    RDW 13.2 05/26/2021     12/28/2023     05/26/2021       CMP RESULTS:  Lab Results   Component Value Date     (L) 12/30/2023     10/27/2023     05/26/2021    POTASSIUM 3.7 12/30/2023    POTASSIUM 4.1 10/27/2023    POTASSIUM 4.5 05/26/2021    CHLORIDE 97 (L) 12/30/2023    CHLORIDE 103 10/27/2023    CHLORIDE 105 05/26/2021    CO2 27 12/30/2023    CO2 27 10/27/2023    CO2 29 05/26/2021    ANIONGAP 10 12/30/2023    ANIONGAP 7 05/26/2021    GLC 86 12/30/2023     (H) 11/01/2023     (H) 05/26/2021    BUN 13.8 12/30/2023    BUN 41 (H) 05/26/2021    CR 1.00 12/30/2023    CR 0.80 05/26/2021    GFRESTIMATED 79 12/30/2023    GFRESTIMATED >60 10/26/2023    GFRESTIMATED 90 05/26/2021    GFRESTBLACK >90 05/26/2021    PRANAV 9.0 12/30/2023    PRANAV 8.3 (L) 05/26/2021    BILITOTAL 0.5 12/30/2023    BILITOTAL 0.5 03/20/2018    ALBUMIN 3.6 12/30/2023    ALBUMIN 3.7 03/20/2018    ALKPHOS 107 12/30/2023    ALKPHOS 82 03/20/2018    ALT 24 12/30/2023    ALT 22 03/20/2018    AST 24 12/30/2023    AST 20 03/20/2018        INR RESULTS:  Lab Results   Component Value Date    INR 1.44 (H) 12/27/2023    INR 1.15 (H) 05/26/2021       Lab Results   Component Value Date    MAG 1.9 12/30/2023     Lab Results   Component Value Date    NTBNPI 6,246 (H) 12/30/2023     Lab Results   Component Value Date    NTBNP 5,864 (H) 11/30/2023       Cardiac Diagnostics:    12/6/2023  CMR  Clinical history: 73 year old with anterior STEMI, cardiac arrest in Oct 2023     Comparison CMR: none     1. The left ventricle is severely enlarged. There is wall thinning and akinesis of the mid-distal anterior,  mid anteroseptum, distal septum and the apex  The global systolic function is severely reduced. The LVEF is  28%.     2. The right ventricle is normal in cavity size. The global systolic function is normal. The RVEF is 52%.      3. The right atrium is normal and the left atrium is severely enlarged.     4. There is mild mitral regurgitation.      5. There is transmural late gadolinium enhancement in mid-distal anterior, mid anteroseptum, distal  sepum  and the apex consistent with a large infarction in the LAD territory.      6. There is no pericardial effusion.     7. There is no intracardiac thrombus.     8. There are bilateral pleural effusions.        CONCLUSIONS:   Ischemic cardiomyopathy with a large anteroapical infarction.   Severely reduced left ventricular function and normal right ventricular function, LVEF 28% and RVEF 52%.       10/30/2023 Echo  Interpretation Summary  Ischemic CM. Large LAD MI. Left ventricular function is decreased. The  ejection fraction is 20-30% (severely reduced).  Global right ventricular function is normal.  No significant valvular abnormalities present.  IVC diameter <2.1 cm collapsing >50% with sniff suggests a normal RA pressure  of 3 mmHg.  No pericardial effusion is present.  No significant changes noted.      Assessment and Plan:   Mr. Aguila is a 73 year old male with medical history pertinent for anterior STEMI s/p PCI to the pLAD on 10/26/23 with a VT/VF arrest the next day (10/27) with patent stents and unchanged anatomy on repeat angiogram. Placed on amiodarone and discharged 11/03/23, ICD was not indicated as this was within 48h of his MI. His EF prior to discharge was 20-30% with a large area of akinesis in the LAD placed on apixaban due to this.  He presents to [facility] clinic for routine follow up.     Appearing and feeling well. Euvolemic by exam. Blood pressures are low stable with SBPs ranging mid-90s to 110s. He has been holding losartan and metoprolol. For today we will try switching losartan to lisinopril 2.5 mg daily. Resume metoprolol at bedtime. We will continue bumex at 1 mg daily. Review of today's labs demonstrate normal lytes and stable end organ function. Continue plavix and eliquis.       # Chronic systolic heart failure/HFrEF secondary to ICM (EF 20-30% by TTE 10/23)  Stage C. NYHA Class II.    Fluid status: euvolemic  ACEi/ARB/ARNi/afterload reduction: start lisinopril 2.5 mg daily, hold for SBP < 20  BB: metoprolol succinate 12.5 mg HS, hold for SBP < 90, HR < 50  Aldosterone antagonist: deferred while other medical therapy is prioritized  SGLT2i: deferred while other medical therapy is prioritized  SCD prophylaxis: decision deferred during medication uptitration  NSAID use: contraindicated  Sleep apnea evaluation: eval with sleep medicine 3/12/24    # CAD s/p PCI to LAD  # STEMI  # VT/VF arrest  - on plavix and apixaban (akinesis of the mid-distal anterior,mid anteroseptum, distal septum and the apex)      Follow up:  - [facility] 1 month   - Critical Care 2/19/24      Sirisha Arias DNP, NP-C  Advance Heart Failure  1/10/2024

## 2024-01-10 NOTE — LETTER
1/10/2024      RE: Duane C Johnson  [id number] [address] [place] [id number]       Dear Colleague,    Thank you for the opportunity to participate in the care of your patient, Duane C Johnson, at the [facility] [place] at [facility]. Please see a copy of my visit note below.      [facility] Cardiology   CORE Clinic      HPI:   Mr. Aguila is a 73 year old male with medical history pertinent for anterior STEMI s/p PCI to the pLAD on 10/26/23 with a VT/VF arrest the next day (10/27) with patent stents and unchanged anatomy on repeat angiogram. Placed on amiodarone and discharged 11/03/23, ICD was not indicated as this was within 48h of his MI. His EF prior do discharge was 20-30% with a large area of akinesis in the LAD placed on apixaban due to this. He presents to CORE for routine follow up.      Mr. Aguila was readmitted 11/6-11/20/23 with ADHF and treated with IV lasix. He had some lightheadedness and his metoprolol dose was adjusted. Brillinta changed to the plavix due to SOB. Seen in the ED on 11/22/23 for hypotension with BPs 80s/60s. Holding lasix, metoprolol, and losartan. Of note, he was asymptomatic with hypotension.     At initial CORE visit in 11/2023, reported feeling well. Had been frequently holding losartan and metoprolol because of prescribed hold parameters. While BPs were lower with systolic pressures in the 90s-100s, he was asymptomatic. We liberalized hold parameters in order to allow for more consistency with taking GDMT.      Since our initial visit, Mr. Aguila was seen in the ED on 12/20 for chest pain with unremarkable work-up, and then admitted 12/25-12/28 for shortness of breath with bilateral pleural effusions s/p thoracentesis and ADHF exacerbation. He returned to the ED on 12/30 with SOB and was IV diuresed with bumex and his outpatient regimen was switched to PO bumex. He was instructed to hold losartan and metoprolol for  hypotension.     Today, Mr. Aguila presents to clinic with his wife. He reports feeling well. Since hospital discharge he has been gradually increasing his activity. [place] and takes daily walks. He has also been attending cardiac rehab. He finds that he is able to exercise for longer periods of time and is recovering more quickly. Denies CP, palpitations, lightheadedness, syncope. Denies orthopnea, PND, LE edema. Breathing has been much improve since thora and denies any acute SOB. DIOP is in proportion to exertion. He has continued to hold losartan and lisinopril.    Wife closely monitors sodium and fluid intake. They have been adhering to a 1.5 L FR and 1500 mg sodium restriction.     Monitors daily BPs, /60s, HR 50-65  Holding Metoprolol for SBP < 90  Weight 138-140 lb    Cardiac Medications   - Amiodarone 200 mg daily   - Eliquis 5 mg BID  - Atorvastatin 80 mg daily   - Plavix 75 mg daily   - Bumex 1 mg daily   - Losartan 12.5 mg daily  - Metoprolol succinate 12.5 mg daily      PAST MEDICAL HISTORY:  Past Medical History:   Diagnosis Date    Benign essential hypertension     CAD (coronary artery disease)     Chronic systolic heart failure (H)     Hypertension     ST elevation myocardial infarction (STEMI), unspecified artery (H)     Ventricular fibrillation (H)        FAMILY HISTORY:  Family History   Problem Relation Age of Onset    Other Cancer Mother     Obesity Mother     Cervical Cancer Sister     GI problems Father     Other Cancer Sister        SOCIAL HISTORY:  Social History     Socioeconomic History    Marital status:      Spouse name: None    Number of children: None    Years of education: None    Highest education level: None   Tobacco Use    Smoking status: Former     Packs/day: .25     Types: Cigarettes     Passive exposure: Current    Smokeless tobacco: Never    Tobacco comments:     Quit 10/26/2023   Vaping Use    Vaping Use: Never used   Substance and Sexual  Activity    Alcohol use: Yes     Comment: occas    Drug use: No    Sexual activity: Yes     Partners: Female     Birth control/protection: None   Other Topics Concern    Parent/sibling w/ CABG, MI or angioplasty before 65F 55M? No     Social Determinants of Health     Financial Resource Strain: Low Risk  (11/23/2023)    Financial Resource Strain     Within the past 12 months, have you or your family members you live with been unable to get utilities (heat, electricity) when it was really needed?: No   Food Insecurity: Low Risk  (11/23/2023)    Food Insecurity     Within the past 12 months, did you worry that your food would run out before you got money to buy more?: No     Within the past 12 months, did the food you bought just not last and you didn t have money to get more?: No   Transportation Needs: Low Risk  (11/23/2023)    Transportation Needs     Within the past 12 months, has lack of transportation kept you from medical appointments, getting your medicines, non-medical meetings or appointments, work, or from getting things that you need?: No   Physical Activity: Inactive (11/16/2023)    Exercise Vital Sign     Days of Exercise per Week: 0 days     Minutes of Exercise per Session: 0 min   Social Connections: Unknown (11/16/2023)    Social Connection and Isolation Panel [NHANES]     Marital Status:    Interpersonal Safety: Low Risk  (10/9/2023)    Interpersonal Safety     Do you feel physically and emotionally safe where you currently live?: Yes     Within the past 12 months, have you been hit, slapped, kicked or otherwise physically hurt by someone?: No     Within the past 12 months, have you been humiliated or emotionally abused in other ways by your partner or ex-partner?: No   Housing Stability: Low Risk  (11/23/2023)    Housing Stability     Do you have housing? : Yes     Are you worried about losing your housing?: No       CURRENT MEDICATIONS:  acetaminophen (TYLENOL) 325 MG tablet, Take 650 mg by  mouth every 6 hours as needed  amiodarone (PACERONE) 200 MG tablet, Take 1 tablet (200 mg) by mouth daily  apixaban ANTICOAGULANT (ELIQUIS) 5 MG tablet, Take 1 tablet (5 mg) by mouth 2 times daily  atorvastatin (LIPITOR) 80 MG tablet, Take 1 tablet (80 mg) by mouth daily  bumetanide (BUMEX) 1 MG tablet, Take 1 tablet (1 mg) by mouth daily  cetirizine (ZYRTEC) 10 MG tablet, Take 10 mg by mouth daily  clopidogrel (PLAVIX) 75 MG tablet, Take 1 tablet (75 mg) by mouth daily  escitalopram (LEXAPRO) 10 MG tablet, Take 1 tablet (10 mg) by mouth daily  fish oil-omega-3 fatty acids 1000 MG capsule, Take 1 g by mouth 2 times daily Also contains another supplement  furosemide (LASIX) 20 MG tablet, Take 1 tablet (20 mg) by mouth daily  hydrOXYzine HCl (ATARAX) 25 MG tablet, Take 1 tablet (25 mg) by mouth every 8 hours as needed for anxiety  losartan (COZAAR) 25 MG tablet, Take 0.5 tablets (12.5 mg) by mouth daily  magnesium oxide (MAG-OX) 400 MG tablet, Take 1 tablet (400 mg) by mouth daily for 30 days  melatonin 5 MG tablet, Take 1-2 tablets (5-10 mg) by mouth at bedtime  metoprolol succinate ER (TOPROL XL) 25 MG 24 hr tablet, Take 0.5 tablets (12.5 mg) by mouth daily  multivitamin w/minerals (THERA-VIT-M) tablet, Take 1 tablet by mouth daily  traZODone (DESYREL) 50 MG tablet, Take 25 mg by mouth nightly as needed for sleep (Patient not taking: Reported on 1/2/2024)  vitamin C (ASCORBIC ACID) 1000 MG TABS, Take 1,000 mg by mouth daily    No current facility-administered medications on file prior to visit.      ROS:   Refer to HPI    EXAM:  /69 (BP Location: Right arm, Patient Position: Chair, Cuff Size: Adult Small)   Pulse 61   Wt 61 kg (134 lb 8 oz)   SpO2 95%   BMI 21.07 kg/m       GENERAL: Appears comfortable, in no acute distress.   HEENT: Eye symmetrical, no discharge or icterus bilaterally. Mucous membranes moist and without lesions.  CV: RRR, +S1S2, no murmur, rub, or gallop. JVP < 6cm.   RESPIRATORY:  Respirations regular, even, and unlabored. Lungs CTA throughout.   GI: Soft and non distended with normoactive bowel sounds present in all quadrants. No tenderness, rebound, guarding. No hepatomegaly.   EXTREMITIES: no peripheral edema. 2+ bilateral pedal pulses.   NEUROLOGIC: Alert and oriented x 3. No focal deficits.   MUSCULOSKELETAL: No joint swelling or tenderness.   SKIN: No jaundice. No rashes or lesions.     Labs, reviewed with patient in clinic today:  CBC RESULTS:  Lab Results   Component Value Date    WBC 8.0 12/28/2023    WBC 12.2 (H) 05/26/2021    RBC 3.99 (L) 12/28/2023    RBC 3.60 (L) 05/26/2021    HGB 12.4 (L) 12/28/2023    HGB 12.0 (L) 05/26/2021    HCT 36.5 (L) 12/28/2023    HCT 34.8 (L) 05/26/2021    MCV 92 12/28/2023    MCV 97 05/26/2021    MCH 31.1 12/28/2023    MCH 33.3 (H) 05/26/2021    MCHC 34.0 12/28/2023    MCHC 34.5 05/26/2021    RDW 13.2 12/28/2023    RDW 13.2 05/26/2021     12/28/2023     05/26/2021       CMP RESULTS:  Lab Results   Component Value Date     (L) 12/30/2023     10/27/2023     05/26/2021    POTASSIUM 3.7 12/30/2023    POTASSIUM 4.1 10/27/2023    POTASSIUM 4.5 05/26/2021    CHLORIDE 97 (L) 12/30/2023    CHLORIDE 103 10/27/2023    CHLORIDE 105 05/26/2021    CO2 27 12/30/2023    CO2 27 10/27/2023    CO2 29 05/26/2021    ANIONGAP 10 12/30/2023    ANIONGAP 7 05/26/2021    GLC 86 12/30/2023     (H) 11/01/2023     (H) 05/26/2021    BUN 13.8 12/30/2023    BUN 41 (H) 05/26/2021    CR 1.00 12/30/2023    CR 0.80 05/26/2021    GFRESTIMATED 79 12/30/2023    GFRESTIMATED >60 10/26/2023    GFRESTIMATED 90 05/26/2021    GFRESTBLACK >90 05/26/2021    PRANAV 9.0 12/30/2023    PRANAV 8.3 (L) 05/26/2021    BILITOTAL 0.5 12/30/2023    BILITOTAL 0.5 03/20/2018    ALBUMIN 3.6 12/30/2023    ALBUMIN 3.7 03/20/2018    ALKPHOS 107 12/30/2023    ALKPHOS 82 03/20/2018    ALT 24 12/30/2023    ALT 22 03/20/2018    AST 24 12/30/2023    AST 20 03/20/2018        INR  RESULTS:  Lab Results   Component Value Date    INR 1.44 (H) 12/27/2023    INR 1.15 (H) 05/26/2021       Lab Results   Component Value Date    MAG 1.9 12/30/2023     Lab Results   Component Value Date    NTBNPI 6,246 (H) 12/30/2023     Lab Results   Component Value Date    NTBNP 5,864 (H) 11/30/2023       Cardiac Diagnostics:    12/6/2023 CMR  Clinical history: 73 year old with anterior STEMI, cardiac arrest in Oct 2023     Comparison CMR: none     1. The left ventricle is severely enlarged. There is wall thinning and akinesis of the mid-distal anterior,  mid anteroseptum, distal septum and the apex  The global systolic function is severely reduced. The LVEF is  28%.     2. The right ventricle is normal in cavity size. The global systolic function is normal. The RVEF is 52%.      3. The right atrium is normal and the left atrium is severely enlarged.     4. There is mild mitral regurgitation.      5. There is transmural late gadolinium enhancement in mid-distal anterior, mid anteroseptum, distal  sepum  and the apex consistent with a large infarction in the LAD territory.      6. There is no pericardial effusion.     7. There is no intracardiac thrombus.     8. There are bilateral pleural effusions.        CONCLUSIONS:   Ischemic cardiomyopathy with a large anteroapical infarction.   Severely reduced left ventricular function and normal right ventricular function, LVEF 28% and RVEF 52%.       10/30/2023 Echo  Interpretation Summary  Ischemic CM. Large LAD MI. Left ventricular function is decreased. The  ejection fraction is 20-30% (severely reduced).  Global right ventricular function is normal.  No significant valvular abnormalities present.  IVC diameter <2.1 cm collapsing >50% with sniff suggests a normal RA pressure  of 3 mmHg.  No pericardial effusion is present.  No significant changes noted.      Assessment and Plan:   Mr. Aguila is a 73 year old male with medical history pertinent for anterior STEMI s/p PCI  to the pLAD on 10/26/23 with a VT/VF arrest the next day (10/27) with patent stents and unchanged anatomy on repeat angiogram. Placed on amiodarone and discharged 11/03/23, ICD was not indicated as this was within 48h of his MI. His EF prior to discharge was 20-30% with a large area of akinesis in the LAD placed on apixaban due to this. He presents to CORE clinic for routine follow up.     Appearing and feeling well. Euvolemic by exam. Blood pressures are low stable with SBPs ranging mid-90s to 110s. He has been holding losartan and metoprolol. For today we will try switching losartan to lisinopril 2.5 mg daily. Resume metoprolol at bedtime. We will continue bumex at 1 mg daily. Review of today's labs demonstrate normal lytes and stable end organ function. Continue plavix and eliquis.       # Chronic systolic heart failure/HFrEF secondary to ICM (EF 20-30% by TTE 10/23)  Stage C. NYHA Class II.    Fluid status: euvolemic  ACEi/ARB/ARNi/afterload reduction: start lisinopril 2.5 mg daily, hold for SBP < 20  BB: metoprolol succinate 12.5 mg HS, hold for SBP < 90, HR < 50  Aldosterone antagonist: deferred while other medical therapy is prioritized  SGLT2i: deferred while other medical therapy is prioritized  SCD prophylaxis: decision deferred during medication uptitration  NSAID use: contraindicated  Sleep apnea evaluation: eval with sleep medicine 3/12/24    # CAD s/p PCI to LAD  # STEMI  # VT/VF arrest  - on plavix and apixaban (akinesis of the mid-distal anterior,mid anteroseptum, distal septum and the apex)      Follow up:  - CORE 1 month   - Critical Care 2/19/24      Sirisha Arias DNP, NP-C  Advance Heart Failure  1/10/2024

## 2024-01-10 NOTE — PATIENT INSTRUCTIONS
Take your medicines every day, as directed    Changes made today:  Stop losartan  Start lisinopril 2.5 mg daily, hold for systolic BP < 100  Resume metoprolol 12.5 mg at bedtime   Monitor Your Weight and Symptoms    Contact us if you:    Gain 2 pounds in one day or 5 pounds in one week  Feel more short of breath  Notice more leg swelling  Feel lightheadeded   Change your lifestyle    Limit Salt or Sodium:  2000 mg  Limit Fluids:  2000 mL or approximately 64 ounces  Eat a Heart Healthy Diet  Low in saturated fats  Stay Active:  Aim to move at least 150 minutes every  week         To Contact us    During Business Hours:  [phone], option # 1      After hours, weekends or holidays:   [phone], Option #4  Ask to speak to the On-Call Cardiologist. Inform them you are a CORE/heart failure patient at the [facility].     Use [facility] allows you to communicate directly with your heart team through secure messaging.  [place] can be accessed any time on your phone, computer, or tablet.  If you need assistance, we'd be happy to help!         Keep your Heart Appointments:    Referral to Heart Failure Cardiology     CORE in ~ 4 weeks      Please consider attending our virtual support group which is held monthly. Please reach out to Rajesh at [phone] for more information if you are interested in attending.     2024 dates:    Monday, January 8th, 1-2pm     Monday, February 5th , 1-2pm     Monday, March 4th , 1-2pm     Monday, April 1st, 1-2pm     Monday, May 6th, 1-2pm     Monday, June 3rd, 1-2pm     Monday, July 1st, 1-2pm     Monday, August 5th, 1-2pm     Monday, September 9th, 1-2pm     Monday, October 7th, 1-2pm     Monday, November 4th, 1-2pm     Monday, December 2nd, 1-2pm

## 2024-01-11 ENCOUNTER — CARE COORDINATION (OUTPATIENT)
Dept: CARDIOLOGY | Facility: CLINIC | Age: 74
End: 2024-01-11
Payer: MEDICARE

## 2024-01-11 NOTE — PROGRESS NOTES
New Heart Failure Referral     Demographics:  [address] [address] [facility] [address]   Home Phone [phone]   Mobile [phone]        Referred By:   Sirisha Arias CNP    Background:  Was seen in [facility] yesterday 1/10/24. Didn't see a note stating being referred to [facility] HF but there is a referral for HF in [facility].   Sending to Heart Failure RN to Review.    Plan:   Patient contacted and appointment made with Dr. Cruz on 2/7 with labs prior

## 2024-01-12 ENCOUNTER — TELEPHONE (OUTPATIENT)
Dept: CARDIOLOGY | Facility: CLINIC | Age: 74
End: 2024-01-12
Payer: MEDICARE

## 2024-01-12 ENCOUNTER — PATIENT OUTREACH (OUTPATIENT)
Dept: CARDIOLOGY | Facility: CLINIC | Age: 74
End: 2024-01-12
Payer: MEDICARE

## 2024-01-12 ENCOUNTER — HOSPITAL ENCOUNTER (EMERGENCY)
Facility: CLINIC | Age: 74
Discharge: HOME | End: 2024-01-13
Attending: FAMILY MEDICINE | Admitting: FAMILY MEDICINE
Payer: MEDICARE

## 2024-01-12 ENCOUNTER — HOSPITAL ENCOUNTER (OUTPATIENT)
Dept: CARDIAC REHAB | Facility: CLINIC | Age: 74
Discharge: HOME | End: 2024-01-12
Attending: INTERNAL MEDICINE
Payer: MEDICARE

## 2024-01-12 DIAGNOSIS — I25.5 ISCHEMIC CARDIOMYOPATHY: ICD-10-CM

## 2024-01-12 DIAGNOSIS — E87.6 HYPOKALEMIA: ICD-10-CM

## 2024-01-12 DIAGNOSIS — I50.20 HEART FAILURE WITH REDUCED EJECTION FRACTION, NYHA CLASS II (H): ICD-10-CM

## 2024-01-12 LAB
BASOPHILS # BLD AUTO: 0.1 10E3/UL (ref 0–0.2)
BASOPHILS NFR BLD AUTO: 1 %
EOSINOPHIL # BLD AUTO: 0.1 10E3/UL (ref 0–0.7)
EOSINOPHIL NFR BLD AUTO: 2 %
ERYTHROCYTE [DISTWIDTH] IN BLOOD BY AUTOMATED COUNT: 13.8 % (ref 10–15)
HCT VFR BLD AUTO: 36.3 % (ref 40–53)
HGB BLD-MCNC: 12 G/DL (ref 13.3–17.7)
IMM GRANULOCYTES # BLD: 0 10E3/UL
IMM GRANULOCYTES NFR BLD: 0 %
LYMPHOCYTES # BLD AUTO: 1.4 10E3/UL (ref 0.8–5.3)
LYMPHOCYTES NFR BLD AUTO: 27 %
MCH RBC QN AUTO: 30.9 PG (ref 26.5–33)
MCHC RBC AUTO-ENTMCNC: 33.1 G/DL (ref 31.5–36.5)
MCV RBC AUTO: 94 FL (ref 78–100)
MONOCYTES # BLD AUTO: 0.5 10E3/UL (ref 0–1.3)
MONOCYTES NFR BLD AUTO: 9 %
NEUTROPHILS # BLD AUTO: 3.2 10E3/UL (ref 1.6–8.3)
NEUTROPHILS NFR BLD AUTO: 61 %
NRBC # BLD AUTO: 0 10E3/UL
NRBC BLD AUTO-RTO: 0 /100
PLATELET # BLD AUTO: 304 10E3/UL (ref 150–450)
RBC # BLD AUTO: 3.88 10E6/UL (ref 4.4–5.9)
WBC # BLD AUTO: 5.3 10E3/UL (ref 4–11)

## 2024-01-12 PROCEDURE — 99284 EMERGENCY DEPT VISIT MOD MDM: CPT | Mod: 25 | Performed by: FAMILY MEDICINE

## 2024-01-12 PROCEDURE — 93798 PHYS/QHP OP CAR RHAB W/ECG: CPT

## 2024-01-12 PROCEDURE — 99284 EMERGENCY DEPT VISIT MOD MDM: CPT

## 2024-01-12 PROCEDURE — 80053 COMPREHEN METABOLIC PANEL: CPT | Performed by: FAMILY MEDICINE

## 2024-01-12 PROCEDURE — 93005 ELECTROCARDIOGRAM TRACING: CPT

## 2024-01-12 PROCEDURE — 93010 ELECTROCARDIOGRAM REPORT: CPT | Performed by: FAMILY MEDICINE

## 2024-01-12 PROCEDURE — 83880 ASSAY OF NATRIURETIC PEPTIDE: CPT | Performed by: FAMILY MEDICINE

## 2024-01-12 PROCEDURE — 85025 COMPLETE CBC W/AUTO DIFF WBC: CPT | Performed by: FAMILY MEDICINE

## 2024-01-12 PROCEDURE — 36415 COLL VENOUS BLD VENIPUNCTURE: CPT | Performed by: FAMILY MEDICINE

## 2024-01-12 ASSESSMENT — ACTIVITIES OF DAILY LIVING (ADL): ADLS_ACUITY_SCORE: 35

## 2024-01-12 NOTE — TELEPHONE ENCOUNTER
Received message from Cardiac that Duane's blood pressure was low (78/48).  He's feeling fine and continuing to work out.      He reports that he is feeling pretty good- no dizziness, not more tired than usual    Reports that at home is blood pressure is around 80-90 or a bit higher/50-60's but asked that I call his wife for exact numbers:     1/12   A 102/64 48    1/11   P 93/59 57  A 88/57 56    1/10  P 87/45 58  A 103/69 61    1/9  P 102/61 57  A 107/63 58      He hasn't started lisinopril, did take the metoprolol last night  but hadn't taken it since 1/4 before that.  Is not taking losartan - last dose on 12/30.      On 1/5 his blood pressure was erratic  and was a low as 74/50/59

## 2024-01-12 NOTE — PROGRESS NOTES
Patient is referred by Sirisha Arias NP/CORE. Pt has h/o AMI with BRENDA in 10/2023.  Readmitted 12/2023 in acute heart failure.  EF 28%.  All notes/imaging in chart.    1/12/24 - Spoke with patient and wife this morning.  Offered appt.

## 2024-01-13 VITALS
HEIGHT: 66 IN | BODY MASS INDEX: 21.23 KG/M2 | HEART RATE: 47 BPM | DIASTOLIC BLOOD PRESSURE: 56 MMHG | RESPIRATION RATE: 16 BRPM | SYSTOLIC BLOOD PRESSURE: 89 MMHG | WEIGHT: 132.1 LBS | TEMPERATURE: 98 F | OXYGEN SATURATION: 96 %

## 2024-01-13 LAB
ALBUMIN SERPL BCG-MCNC: 3.3 G/DL (ref 3.5–5.2)
ALP SERPL-CCNC: 98 U/L (ref 40–150)
ALT SERPL W P-5'-P-CCNC: 33 U/L (ref 0–70)
ANION GAP SERPL CALCULATED.3IONS-SCNC: 9 MMOL/L (ref 7–15)
AST SERPL W P-5'-P-CCNC: 32 U/L (ref 0–45)
BILIRUB SERPL-MCNC: 0.4 MG/DL
BUN SERPL-MCNC: 15 MG/DL (ref 8–23)
CALCIUM SERPL-MCNC: 8.6 MG/DL (ref 8.8–10.2)
CHLORIDE SERPL-SCNC: 94 MMOL/L (ref 98–107)
CREAT SERPL-MCNC: 1.05 MG/DL (ref 0.67–1.17)
DEPRECATED HCO3 PLAS-SCNC: 28 MMOL/L (ref 22–29)
EGFRCR SERPLBLD CKD-EPI 2021: 75 ML/MIN/1.73M2
GLUCOSE SERPL-MCNC: 121 MG/DL (ref 70–99)
NT-PROBNP SERPL-MCNC: 4102 PG/ML (ref 0–900)
POTASSIUM SERPL-SCNC: 3.3 MMOL/L (ref 3.4–5.3)
PROT SERPL-MCNC: 6.1 G/DL (ref 6.4–8.3)
SODIUM SERPL-SCNC: 131 MMOL/L (ref 135–145)

## 2024-01-13 RX ORDER — POTASSIUM CHLORIDE 1500 MG/1
20 TABLET, EXTENDED RELEASE ORAL DAILY
Qty: 30 TABLET | Refills: 0 | Status: SHIPPED | OUTPATIENT
Start: 2024-01-13 | End: 2024-02-07

## 2024-01-13 NOTE — ED TRIAGE NOTES
Pt arrives with concerns for ongoing hypotension since his heart attacks in October. Pt has been checking his BP at home and has had SBP in the 70's and 80's. Pts cardiologist wanted him to come in to be evaluated. Pt denies sx, no chest pain, SOB, dizziness, headache, or vision changes.     Triage Assessment (Adult)       Row Name 01/12/24 [id number]          Triage Assessment    Airway WDL WDL        Respiratory WDL    Respiratory WDL WDL        Skin Circulation/Temperature WDL    Skin Circulation/Temperature WDL WDL        Cardiac WDL    Cardiac WDL WDL        Peripheral/Neurovascular WDL    Peripheral Neurovascular WDL WDL        Cognitive/Neuro/Behavioral WDL    Cognitive/Neuro/Behavioral WDL WDL

## 2024-01-13 NOTE — ED PROVIDER NOTES
History     Chief Complaint   Patient presents with    Hypotension     HPI  Duane C Johnson is a 73 year old male who comes into the emergency department due to low blood pressure readings.  He has been measuring his blood pressure with a wrist cuff.  His lowest reading was 77/44 with a heart rate of about 50.  He has been variably using his medications because of intolerance or because of low blood pressure readings.  He had not taken metoprolol for a week but then took a dose yesterday of 12.5 mg but did not take it today.  He has not had his losartan today and has not started the lisinopril.  He went to cardiac rehab today where he had readings of 78 systolic but completed rehab.  He is asymptomatic with the apparently low readings.  He is on a 1500 cc fluid restriction.  He is on a 1500 mg sodium restriction.  He says he is adhering to this.  His appetite is poor because he has no taste.  He is not having any irritative or obstructive voiding symptoms.  He has troubles with constipation but no diarrhea.    His weight fluctuates around 131 pounds.  He weighs himself at least once daily.  He has not had any consistent increase or decrease in his weight.  He does not smoke and rarely consumes alcohol and has not done so recently.    He had an anterior ST segment elevation MI in October 2023 complicated by V-fib arrest on the day following percutaneous coronary intervention and was found to have patent stents and unchanged anatomy.  He was placed on amiodarone and discharged.  An ICD was not indicated.  His EF is 25 to 30% and he has a large area of akinesis in the LAD distribution and was put on apixaban due to this.  He has had numerous recurrent visits for acute decompensated heart failure and episodes of hypotension.  He was seen in the cardiology clinic 2 days ago and advised to stop losartan and start lisinopril, resume metoprolol 12.5 mg once daily.  He was advised to continue on bumetanide 1 mg daily and  continue Plavix and Eliquis.     He was hospitalized December 25 to 28 for shortness of breath and orthopnea with bilateral pleural effusions and acute decompensated congestive heart failure.  Pleural effusions were drained December 27 and December 28 during that hospitalization    Allergies:  Allergies   Allergen Reactions    Brilinta [Ticagrelor] Other (See Comments) and Difficulty breathing     Per pt and spouse, hyperventilation.    Clonazepam Other (See Comments)     Per spouse, acted like a zombie and he was shaky, could barely talk.       Problem List:    Patient Active Problem List    Diagnosis Date Noted    Atherosclerosis of native coronary artery of native heart with angina pectoris (H24) 01/02/2024     Priority: Medium    Acute on chronic congestive heart failure, unspecified heart failure type (H) 12/25/2023     Priority: Medium    Acute decompensated heart failure (H) 12/25/2023     Priority: Medium    Anxiety 11/13/2023     Priority: Medium    Congestive heart failure, unspecified HF chronicity, unspecified heart failure type (H) 11/13/2023     Priority: Medium    ST elevation myocardial infarction involving left anterior descending (LAD) coronary artery (H) 11/13/2023     Priority: Medium    Multifocal pneumonia 11/07/2023     Priority: Medium    Elevated brain natriuretic peptide (BNP) level 11/07/2023     Priority: Medium    Primary hypertension 11/07/2023     Priority: Medium    SOB (shortness of breath) 11/07/2023     Priority: Medium    Coronary artery disease due to calcified coronary lesion 11/07/2023     Priority: Medium    Pneumonia of left upper lobe due to infectious organism 11/07/2023     Priority: Medium    Systolic CHF (H) 11/07/2023     Priority: Medium    Hx of cardiac arrest 10/27/2023     Priority: Medium    ST elevation MI (STEMI) (H) 10/26/2023     Priority: Medium    Prostate cancer (H) 09/23/2023     Priority: Medium    Hyperlipidemia LDL goal <130 01/26/2016     Priority:  Medium    Adiposity 05/06/2010     Priority: Medium    Tobacco dependence syndrome 01/23/2008     Priority: Medium        Past Medical History:    Past Medical History:   Diagnosis Date    Benign essential hypertension     CAD (coronary artery disease)     Chronic systolic heart failure (H)     Hypertension     ST elevation myocardial infarction (STEMI), unspecified artery (H)     Ventricular fibrillation (H)        Past Surgical History:    Past Surgical History:   Procedure Laterality Date    COLONOSCOPY N/A 3/23/2023    Procedure: COLONOSCOPY, FLEXIBLE, WITH LESION REMOVAL USING SNARE;  Surgeon: Jose Faustin MD;  Location: [facility]    CV CENTRAL VENOUS CATHETER PLACEMENT N/A 10/26/2023    Procedure: Central Venous Catheter Placement;  Surgeon: Rob Lyles MD;  Location:  [facility]    CV CORONARY ANGIOGRAM N/A 10/27/2023    Procedure: Coronary Angiogram;  Surgeon: Rob Lyles MD;  Location:  [facility]    CV CORONARY ANGIOGRAM N/A 10/26/2023    Procedure: Coronary Angiogram;  Surgeon: Rob Lyles MD;  [facility]    CV LEFT HEART CATH N/A 10/26/2023    Procedure: Left Heart Catheterization;  Surgeon: Rob Lyles MD;  [facility]    CV PCI N/A 10/27/2023    Procedure: Percutaneous Coronary Intervention;  Surgeon: Rob Lyles MD;  [facility]    CV PCI STENT DRUG ELUTING N/A 10/26/2023    Procedure: Percutaneous Coronary Intervention Stent;  Surgeon: Rob Lyles MD;  [facility]       Family History:    Family History   Problem Relation Age of Onset    Other Cancer Mother     Obesity Mother     Cervical Cancer Sister     GI problems Father     Other Cancer Sister        Social History:  Marital Status:   [2]  Social History     Tobacco Use    Smoking status: Former     Packs/day: .25     Types: Cigarettes      "Passive exposure: Current    Smokeless tobacco: Never    Tobacco comments:     Quit 10/26/2023   Vaping Use    Vaping Use: Never used   Substance Use Topics    Alcohol use: Yes     Comment: occas    Drug use: No        Medications:    amiodarone (PACERONE) 200 MG tablet  apixaban ANTICOAGULANT (ELIQUIS) 5 MG tablet  atorvastatin (LIPITOR) 80 MG tablet  bumetanide (BUMEX) 1 MG tablet  potassium chloride ER (KLOR-CON M) 20 MEQ CR tablet  acetaminophen (TYLENOL) 325 MG tablet  cetirizine (ZYRTEC) 10 MG tablet  clopidogrel (PLAVIX) 75 MG tablet  escitalopram (LEXAPRO) 10 MG tablet  fish oil-omega-3 fatty acids 1000 MG capsule  hydrOXYzine HCl (ATARAX) 25 MG tablet  lisinopril (ZESTRIL) 2.5 MG tablet  magnesium oxide (MAG-OX) 400 MG tablet  melatonin 5 MG tablet  metoprolol succinate ER (TOPROL XL) 25 MG 24 hr tablet  multivitamin w/minerals (THERA-VIT-M) tablet  traZODone (DESYREL) 50 MG tablet  vitamin C (ASCORBIC ACID) 1000 MG TABS          Review of Systems  All other systems are reviewed and are negative    Physical Exam   BP: 90/56  Pulse: 51  Temp: 98  F (36.7  C)  Resp: 12  Height: 167.6 cm (5' 6\")  Weight: 59.9 kg (132 lb 1.6 oz)  SpO2: 96 %      Physical Exam    Nursing note and vitals were reviewed.  Constitutional: Awake and alert, chronically ill and poorly nourished, pale appearing 73-year-old in no apparent discomfort, who does not appear acutely ill, and who answers questions appropriately and cooperates with examination.  HEENT: Voice quality is normal.  PERRL.  EOMI.   Neck: Freely mobile.  Cardiovascular: Cardiac examination reveals bradycardic heart rate and regular rhythm without murmur.  Pulmonary/Chest: Breathing is unlabored.  Breath sounds are clear and equal bilaterally.  There no retractions, tachypnea, rales, wheezes, or rhonchi.  Abdomen: Soft, nontender, no HSM or masses rebound or guarding.  Musculoskeletal: Extremities are warm and well-perfused and without edema  Neurological: Alert, " oriented, thought content logical, coherent   Skin: Warm, dry, no rashes.  Psychiatric: Affect broad and appropriate.    ED Course                 Procedures              EKG Interpretation:      Interpreted by Blas Betts MD  Time reviewed: 22:13  Symptoms at time of EKG: none   Rhythm: sinus   Rate: 49  Axis: normal  Ectopy: none  Conduction: Diffusely low voltage; first-degree AV block  ST Segments/ T Waves: No ST-T wave changes  Q Waves: none  Comparison to prior: Unchanged from 12/30/23    Clinical Impression: Sinus bradycardia with first-degree AV block unchanged from previous EKG and without signs of acute ischemia      Critical Care time:  none               Results for orders placed or performed during the hospital encounter of 01/12/24 (from the past 24 hour(s))   [place] Draw *Canceled*    Narrative    The following orders were created for panel order [place] Draw.  Procedure                               Abnormality         Status                     ---------                               -----------         ------                       Please view results for these tests on the individual orders.   CBC with platelets differential    Narrative    The following orders were created for panel order CBC with platelets differential.  Procedure                               Abnormality         Status                     ---------                               -----------         ------                     CBC with platelets and d...[[id number]]  Abnormal            Final result                 Please view results for these tests on the individual orders.   CBC with platelets and differential   Result Value Ref Range    WBC Count 5.3 4.0 - 11.0 10e3/uL    RBC Count 3.88 (L) 4.40 - 5.90 10e6/uL    Hemoglobin 12.0 (L) 13.3 - 17.7 g/dL    Hematocrit 36.3 (L) 40.0 - 53.0 %    MCV 94 78 - 100 fL    MCH 30.9 26.5 - 33.0 pg    MCHC 33.1 31.5 - 36.5 g/dL    RDW 13.8 10.0 - 15.0 %    Platelet Count 304 150 - 450  10e3/uL    % Neutrophils 61 %    % Lymphocytes 27 %    % Monocytes 9 %    % Eosinophils 2 %    % Basophils 1 %    % Immature Granulocytes 0 %    NRBCs per 100 WBC 0 <1 /100    Absolute Neutrophils 3.2 1.6 - 8.3 10e3/uL    Absolute Lymphocytes 1.4 0.8 - 5.3 10e3/uL    Absolute Monocytes 0.5 0.0 - 1.3 10e3/uL    Absolute Eosinophils 0.1 0.0 - 0.7 10e3/uL    Absolute Basophils 0.1 0.0 - 0.2 10e3/uL    Absolute Immature Granulocytes 0.0 <=0.4 10e3/uL    Absolute NRBCs 0.0 10e3/uL   Comprehensive metabolic panel   Result Value Ref Range    Sodium 131 (L) 135 - 145 mmol/L    Potassium 3.3 (L) 3.4 - 5.3 mmol/L    Carbon Dioxide (CO2) 28 22 - 29 mmol/L    Anion Gap 9 7 - 15 mmol/L    Urea Nitrogen 15.0 8.0 - 23.0 mg/dL    Creatinine 1.05 0.67 - 1.17 mg/dL    GFR Estimate 75 >60 mL/min/1.73m2    Calcium 8.6 (L) 8.8 - 10.2 mg/dL    Chloride 94 (L) 98 - 107 mmol/L    Glucose 121 (H) 70 - 99 mg/dL    Alkaline Phosphatase 98 40 - 150 U/L    AST 32 0 - 45 U/L    ALT 33 0 - 70 U/L    Protein Total 6.1 (L) 6.4 - 8.3 g/dL    Albumin 3.3 (L) 3.5 - 5.2 g/dL    Bilirubin Total 0.4 <=1.2 mg/dL   Nt probnp inpatient (BNP)   Result Value Ref Range    N terminal Pro BNP Inpatient 4,102 (H) 0 - 900 pg/mL       Medications - No data to display    Assessments & Plan (with Medical Decision Making)     73-year-old male with a severe ischemic cardiomyopathy with chronic congestive heart failure presented because of low blood pressure readings on his wrist cuff as in the telephone notes.  His blood pressure in the emergency department was typical of his recent readings over the past several months hovering around 90/60.  He has been entirely asymptomatic with this.  He has been persistently bradycardic despite not being on rate lowering drugs.  He is having only 1 dose of his metoprolol in the last week.  His laboratory studies are reassuring other than mildly low potassium and sodium likely related to his Bumex therapy.  His NT proBNP is  improved from his baseline.  His albumin is low and I suspect his nutrition is poor and he admits that he does not eat much because he has no taste.  Low albumin is causing calcium to look low but the corrected calcium is normal.  I have asked him to work on improving his nutrition and he should follow-up in primary care for further assistance in this regard.  I have asked him to take a potassium supplement 20 mill equivalents once daily until he is seen next week for recheck of all this.  I have asked him not to take metoprolol and to hold off on his transition from losartan to lisinopril until he follows up in primary care and cardiology.  He may continue his Bumex, amiodarone, Eliquis, Plavix.  He should be seen if he develops symptoms of lightheadedness, dyspnea, or chest pain.  He and his wife expressed understanding of the recommendations and their questions were answered.    I have reviewed the nursing notes.    I have reviewed the findings, diagnosis, plan and need for follow up with the patient.         New Prescriptions    POTASSIUM CHLORIDE ER (KLOR-CON M) 20 MEQ CR TABLET    Take 1 tablet (20 mEq) by mouth daily       Final diagnoses:   Heart failure with reduced ejection fraction, NYHA class II (H)   Ischemic cardiomyopathy   Hypokalemia       1/12/2024   [facility] EMERGENCY DEPT       Blas Betts MD  01/13/24 0026

## 2024-01-13 NOTE — DISCHARGE INSTRUCTIONS
Do not initiate lisinopril therapy and stay off of your metoprolol until you see cardiology or primary care next week.  Follow-up next week in primary care for recheck of your laboratory studies and symptoms.  Return to the emergency department if you develop symptoms of lightheadedness, breathing difficulty, chest pain or other worrisome symptoms.  Begin taking potassium supplements 20 mill equivalents once daily.  Work on improving your nutrition and consider taking a nutrition supplement such as Ensure  You may continue your Bumex, Eliquis, Plavix, amiodarone.

## 2024-01-13 NOTE — TELEPHONE ENCOUNTER
I was paged by the pt about a blood pressure of 77/44 mmHg and a heart rate of 50 bpm at 10:18pm. These numbers were obtained with a wrist BP cuff and the pt repeated his measurements 6 times (all were consistent). He feels asymptomatic though (no dizziness, dyspnea, cp, palpitations). I explained to the pt that he should obtain an upper arm BP cuff, the pt verbalized understanding and dismissed my recommendation. The pt then wondered if he should go to the [facility] ED for further eval. I agreed with his plan. The pt then reported that his wife will be driving him to the [facility] ED soon.     Stanislaw Mendoza MD, PhD  Cardiology Fellow  Click to text page [phone]

## 2024-01-15 ENCOUNTER — HOSPITAL ENCOUNTER (OUTPATIENT)
Dept: CARDIAC REHAB | Facility: CLINIC | Age: 74
Discharge: HOME | End: 2024-01-15
Attending: INTERNAL MEDICINE
Payer: MEDICARE

## 2024-01-15 PROCEDURE — 93798 PHYS/QHP OP CAR RHAB W/ECG: CPT

## 2024-01-15 NOTE — PROGRESS NOTES
Reviewed with Sirisha Arias NP. Will hold off on GDMT for now. Has follow up with [facility] provider Dr Cruz 2/7.   Took Metoprolol and Lisinopril off medication list for now.   Called Duane and updated his wife with this information.   Will continue with cardiac rehab and follow up 2/7

## 2024-01-15 NOTE — PROGRESS NOTES
Reviewed with Sirisha Arias NP.     Date: 1/15/2024    Time of Call: 11:08 AM     Diagnosis:  HF     [ VORB ] Ordering provider: Sirisha Arias NP    Order: Hold off on Losartan or Lisinopril. Continue Metoprolol 12.5 mg at bedtime.      Order received by: Chelsey Castillo RN        Called Duane to review. As we were talking noted he went to the ER on Friday night for low BP with systolic in 70s. Had called fellow with low BP readings and instructed to go to ER. Per notes they sent him home with instructions to hold Metoprolol and not take Losartan or Lisinopril.   Reports the whole time he never had any symptoms, no dizziness or lightheadedness.   Reports he checked his BP this morning and was 111/69 with pulse 59.   Reports it was a little lower still over the weekend.   He has cardiac rehab this afternoon so will follow up on those numbers.     Will update provider.

## 2024-01-16 ENCOUNTER — PATIENT OUTREACH (OUTPATIENT)
Dept: CARE COORDINATION | Facility: CLINIC | Age: 74
End: 2024-01-16

## 2024-01-16 ENCOUNTER — OFFICE VISIT (OUTPATIENT)
Dept: FAMILY MEDICINE | Facility: CLINIC | Age: 74
End: 2024-01-16
Payer: MEDICARE

## 2024-01-16 VITALS
HEIGHT: 66 IN | WEIGHT: 135 LBS | HEART RATE: 59 BPM | OXYGEN SATURATION: 97 % | TEMPERATURE: 97.3 F | BODY MASS INDEX: 21.69 KG/M2 | RESPIRATION RATE: 20 BRPM | SYSTOLIC BLOOD PRESSURE: 120 MMHG | DIASTOLIC BLOOD PRESSURE: 80 MMHG

## 2024-01-16 DIAGNOSIS — K40.90 LEFT INGUINAL HERNIA: ICD-10-CM

## 2024-01-16 DIAGNOSIS — I50.20 HEART FAILURE WITH REDUCED EJECTION FRACTION, NYHA CLASS I (H): Primary | ICD-10-CM

## 2024-01-16 DIAGNOSIS — I25.119 ATHEROSCLEROSIS OF NATIVE CORONARY ARTERY OF NATIVE HEART WITH ANGINA PECTORIS (H): ICD-10-CM

## 2024-01-16 DIAGNOSIS — C61 PROSTATE CANCER (H): ICD-10-CM

## 2024-01-16 LAB
ANION GAP SERPL CALCULATED.3IONS-SCNC: 11 MMOL/L (ref 7–15)
BUN SERPL-MCNC: 14.8 MG/DL (ref 8–23)
CALCIUM SERPL-MCNC: 9.3 MG/DL (ref 8.8–10.2)
CHLORIDE SERPL-SCNC: 96 MMOL/L (ref 98–107)
CREAT SERPL-MCNC: 0.99 MG/DL (ref 0.67–1.17)
DEPRECATED HCO3 PLAS-SCNC: 27 MMOL/L (ref 22–29)
EGFRCR SERPLBLD CKD-EPI 2021: 80 ML/MIN/1.73M2
GLUCOSE SERPL-MCNC: 88 MG/DL (ref 70–99)
POTASSIUM SERPL-SCNC: 4.7 MMOL/L (ref 3.4–5.3)
SODIUM SERPL-SCNC: 134 MMOL/L (ref 135–145)

## 2024-01-16 PROCEDURE — 80048 BASIC METABOLIC PNL TOTAL CA: CPT | Performed by: FAMILY MEDICINE

## 2024-01-16 PROCEDURE — 36415 COLL VENOUS BLD VENIPUNCTURE: CPT | Performed by: FAMILY MEDICINE

## 2024-01-16 PROCEDURE — 99215 OFFICE O/P EST HI 40 MIN: CPT | Performed by: FAMILY MEDICINE

## 2024-01-16 RX ORDER — LISINOPRIL 2.5 MG/1
1.25 TABLET ORAL DAILY
Start: 2024-01-16 | End: 2024-02-07

## 2024-01-16 RX ORDER — RESPIRATORY SYNCYTIAL VIRUS VACCINE 120MCG/0.5
0.5 KIT INTRAMUSCULAR ONCE
Qty: 1 EACH | Refills: 0 | Status: CANCELLED | OUTPATIENT
Start: 2024-01-16 | End: 2024-01-16

## 2024-01-16 ASSESSMENT — ACTIVITIES OF DAILY LIVING (ADL): DEPENDENT_IADLS:: INDEPENDENT

## 2024-01-16 ASSESSMENT — PAIN SCALES - GENERAL: PAINLEVEL: NO PAIN (0)

## 2024-01-16 NOTE — PATIENT INSTRUCTIONS
Restart lisinopril, can start with 1/2 tab of the 2.5 mg (let me know if you need any ordered)  Then can report how that is going with the cardiologist  Continue holding off on the metoprolol, carvedilol may be preferred, we'll defer to cardiology    I like the idea of protein powders, protein drinks    Talk to the urologist about the hernia

## 2024-01-16 NOTE — PROGRESS NOTES
Assessment & Plan     Heart failure with reduced ejection fraction, NYHA class I (H)  Well compensated  Continue diuretics, recommend he restart lisinopril, smaller dose ok  - Basic metabolic panel  (Ca, Cl, CO2, Creat, Gluc, K, Na, BUN); Future  - lisinopril (ZESTRIL) 2.5 MG tablet; Take 0.5 tablets (1.25 mg) by mouth daily HOLD for systolic blood pressure < 100  - Basic metabolic panel  (Ca, Cl, CO2, Creat, Gluc, K, Na, BUN)    Prostate cancer (H)  Will contact urology to see when he should reschedule his surgery    Atherosclerosis of native coronary artery of native heart with angina pectoris (H24)  Stable  Will follow up with cardiology    Left inguinal hernia  Discuss with urology    Patient Instructions   Restart lisinopril, can start with 1/2 tab of the 2.5 mg (let me know if you need any ordered)  Then can report how that is going with the cardiologist  Continue holding off on the metoprolol, carvedilol may be preferred, we'll defer to cardiology    I like the idea of protein powders, protein drinks    Talk to the urologist about the hernia             I spent a total of 46 minutes on the day of the visit.   Time spent by me doing chart review, history and exam, documentation and further activities per the note    MED REC REQUIRED  Post Medication Reconciliation Status: discharge medications reconciled and changed, per note/orders      Subjective Duane is a 73 year old, presenting for the following health issues:  ER F/U      1/16/2024    12:59 PM   Additional Questions   Roomed by Jacque       [facility]       ED/UC Followup:    Facility:  [place]  Date of visit: 1/12/2024  Reason for visit: heart failure  Current Status: feels good    73 year old male with medical history pertinent for anterior STEMI s/p PCI to the pLAD on 10/26/23 with a VT/VF arrest the next day (10/27) with patent stents and unchanged anatomy on repeat angiogram. Placed on amiodarone and discharged 11/03/23, ICD was not indicated as this  "was within 48h of his MI. His EF prior do discharge was 20-30% with a large area of akinesis in the LAD placed on apixaban due to this.    He is followed [facility]. They stopped the losartan and started lisinopril and wanted him to resume metoprolol 12.5 mg daily, and keep on bumetanide 1 mg, Plavix and Eliquis.     Had low blood pressure, and went in to emergency department. They recommended he wait to transition to lisinopril and resume metoprolol until he sees me or the cardiologist. Rate there was 47. He did not restart either.       Wants to discuss hernia   Has a small left inguinal hernia, can pop out now and then. Is sore.     Prostate follow up  Has prostate cancer  Was scheduled to have prostate surgery, but had his MI and that was postponed.     Wt Readings from Last 5 Encounters:   01/16/24 61.2 kg (135 lb)   01/12/24 59.9 kg (132 lb 1.6 oz)   01/10/24 61 kg (134 lb 8 oz)   01/02/24 61.7 kg (136 lb)   12/30/23 59.9 kg (132 lb)        Review of Systems   ROS: 5 point ROS negative except as noted above in HPI, including Gen., Resp., CV, GI &  system review.       Objective    /80 (BP Location: Right arm)   Pulse 59   Temp 97.3  F (36.3  C) (Tympanic)   Resp 20   Ht 1.676 m (5' 6\")   Wt 61.2 kg (135 lb)   SpO2 97%   BMI 21.79 kg/m    Body mass index is 21.79 kg/m .  Physical Exam   GENERAL: alert and no distress  NECK: no adenopathy, no mass, no JVD  RESP: lungs clear to auscultation - no rales, rhonchi or wheezes  CV: regular rate and rhythm, normal S1 S2, no S3 or S4, no murmur, click or rub, no peripheral edema  ABDOMEN: soft, nontender, 3 cm soft tissue mass left inguinal area that is reducible  MS: no gross musculoskeletal defects noted, no edema                  "

## 2024-01-17 ENCOUNTER — HOSPITAL ENCOUNTER (OUTPATIENT)
Dept: CARDIAC REHAB | Facility: CLINIC | Age: 74
Discharge: HOME | End: 2024-01-17
Attending: INTERNAL MEDICINE
Payer: MEDICARE

## 2024-01-17 PROBLEM — K40.90 LEFT INGUINAL HERNIA: Status: ACTIVE | Noted: 2024-01-17

## 2024-01-17 PROCEDURE — 93798 PHYS/QHP OP CAR RHAB W/ECG: CPT

## 2024-01-18 ENCOUNTER — PATIENT OUTREACH (OUTPATIENT)
Dept: CARE COORDINATION | Facility: CLINIC | Age: 74
End: 2024-01-18
Payer: MEDICARE

## 2024-01-18 ASSESSMENT — ACTIVITIES OF DAILY LIVING (ADL): DEPENDENT_IADLS:: INDEPENDENT

## 2024-01-18 NOTE — PROGRESS NOTES
Clinic Care Coordination Contact  Follow Up Progress Note     [facility] Emergency Dept     Blas Betts MD  Last attending  Treatment team Heart failure with reduced ejection fraction, NYHA class II (H) +2 more  Clinical impression Hypotension  Chief complaint        Assessment: Patient reports blood pressure this morning 106/57 and pulse 56 (wrist monitor getting an arm blood pressure monitor soon)    Taking Lisinopril 2.5 mg half a tablet daily Metoprolol on hold     Care Gaps:    Health Maintenance Due   Topic Date Due    HF ACTION PLAN  Never done    ZOSTER IMMUNIZATION (1 of 2) Never done    RSV VACCINE (Pregnancy & 60+) (1 - 1-dose 60+ series) Never done    DTAP/TDAP/TD IMMUNIZATION (2 - Td or Tdap) 01/23/2018       Will discuss at next PCP visit     Care Plans  Care Plan: Heart Failure       Problem: Heart Failure Management Needs Improvement       Goal: Demonstrate improved heart failure management       Start Date: 11/16/2023 Expected End Date: 2/23/2024    This Visit's Progress: 70% Recent Progress: 60%    Priority: High    Note:     Barriers: Deconditioned   Strengths: supportive wife   Patient expressed understanding of goal: Yes   Action steps to achieve this goal:  1. I will check my weight daily and call if weight gain is 2-3# in a day or 5# in a week,increased shortness of breath episodes or leg swelling   2. I will take my medications as directed and keep future provider appointments   3. I will keep future Cardiac Rehab appointments   4. I will continue home care services                                Intervention/Education provided during outreach: Continue daily blood pressure checks      Outreach Frequency: 2 weeks, more frequently as needed        Plan:     Care Coordinator will follow up in 1-2 weeks   [facility]   Ruthy Paiz RN, Care Coordinator   [facility]   E-mail [email or web address]   [phone]

## 2024-01-19 ENCOUNTER — HOSPITAL ENCOUNTER (OUTPATIENT)
Dept: CARDIAC REHAB | Facility: CLINIC | Age: 74
Discharge: HOME | End: 2024-01-19
Attending: INTERNAL MEDICINE
Payer: MEDICARE

## 2024-01-19 PROCEDURE — 93798 PHYS/QHP OP CAR RHAB W/ECG: CPT | Performed by: REHABILITATION PRACTITIONER

## 2024-01-22 ENCOUNTER — HOSPITAL ENCOUNTER (OUTPATIENT)
Dept: CARDIAC REHAB | Facility: CLINIC | Age: 74
Discharge: HOME | End: 2024-01-22
Attending: INTERNAL MEDICINE
Payer: MEDICARE

## 2024-01-22 PROCEDURE — 93798 PHYS/QHP OP CAR RHAB W/ECG: CPT

## 2024-01-23 NOTE — TELEPHONE ENCOUNTER
Called Duane to check in on his blood pressures.  Taking lisinopril 1.25 mg, most days, hold SBP if <110 (but maybe it is SBP <100, he wasn't sure, his wife manages that).      1/23- 109/69 HR 48  1/22- 102/63 59  1/21- 95/58 52    Feeling lethargic, but go to the gym three days/week.    Will review with provider and call him back if changes are recommended otherwise he is aware of his appointment with Dr Cruz on 2/7

## 2024-01-24 ENCOUNTER — HOSPITAL ENCOUNTER (OUTPATIENT)
Dept: CARDIAC REHAB | Facility: CLINIC | Age: 74
Discharge: HOME | End: 2024-01-24
Attending: INTERNAL MEDICINE
Payer: MEDICARE

## 2024-01-24 PROCEDURE — 93798 PHYS/QHP OP CAR RHAB W/ECG: CPT

## 2024-01-25 ENCOUNTER — PATIENT OUTREACH (OUTPATIENT)
Dept: ONCOLOGY | Facility: CLINIC | Age: 74
End: 2024-01-25

## 2024-01-25 ENCOUNTER — OFFICE VISIT (OUTPATIENT)
Dept: UROLOGY | Facility: CLINIC | Age: 74
End: 2024-01-25
Payer: MEDICARE

## 2024-01-25 VITALS
BODY MASS INDEX: 21.24 KG/M2 | OXYGEN SATURATION: 96 % | DIASTOLIC BLOOD PRESSURE: 60 MMHG | HEART RATE: 57 BPM | WEIGHT: 132.2 LBS | HEIGHT: 66 IN | SYSTOLIC BLOOD PRESSURE: 90 MMHG

## 2024-01-25 DIAGNOSIS — C61 PROSTATE CANCER (H): Primary | ICD-10-CM

## 2024-01-25 DIAGNOSIS — E83.42 HYPOMAGNESEMIA: ICD-10-CM

## 2024-01-25 LAB — PSA SERPL-MCNC: 3.8 UG/L (ref 0–4)

## 2024-01-25 PROCEDURE — 99213 OFFICE O/P EST LOW 20 MIN: CPT | Performed by: UROLOGY

## 2024-01-25 PROCEDURE — 84153 ASSAY OF PSA TOTAL: CPT | Performed by: UROLOGY

## 2024-01-25 PROCEDURE — 36415 COLL VENOUS BLD VENIPUNCTURE: CPT | Performed by: UROLOGY

## 2024-01-25 NOTE — Clinical Note
1/25/2024       RE: Duane C Johnson  [address] [address] [place] [id number]     Dear Colleague,    Thank you for referring your patient, Duane C Johnson, to the [facility] at [facility]. Please see a copy of my visit note below.      CHIEF COMPLAINT   It was my pleasure to see Duane C Johnson who is a 73 year old male for follow-up of ***.      HPI  Duane C Johnson is a very pleasant 73 year old male who presents with a history of Prostate Cancer.  PSA 4.61 on 3/13/2023. MRI with PIRADS 4 lesion. Now with Sarah 4+3=7 disease on fusion biopsy. No complications from biopsy.     He reports no significant urinary symptoms at baseline. He reports normal erectile function.    PSA  3/13/2023 - 4.61     Fusion Biopsy 9/12/2023  Final Diagnosis   A.  Prostate, target 1, biopsy:  - Prostatic adenocarcinoma  - Aurora score 6 (3+3)  - Grade Group 1  - Extent: Involves 2 of 2 cores (7 mm, 70% and 7 mm, 70%)  - Perineural invasion is present     B.  Prostate, target 2, biopsy:   - Prostatic adenocarcinoma  - Aurora score 7 (4+3)  - Grade group 3  - Extent: Involves 1 of 2 cores (3 mm, 20%)     C.  Prostate, target 3, biopsy:  - Benign prostate tissue     D.  Prostate, right base, biopsy:  - Prostatic adenocarcinoma  - Sarah score 6 (3+3)  - Grade Group 1  - Extent: Involves 1 of 2 cores (1 mm, 10%)     E.  Prostate, right mid, biopsy:  - Prostatic adenocarcinoma  - Sarah score 6 (3+3)  - Grade Group 1  - Extent: Involves 2 of 2 cores (7 mm, 70% and 3 mm, 25%)     F.  Prostate, right apex, biopsy:  - Prostatic adenocarcinoma  - Sarah score 6 (3+3)  - Grade Group 1  - Extent: Involves 2 of 2 cores (5 mm, 40% and 2 mm, 20%)     G.  Prostate, left base, biopsy:  - Prostatic adenocarcinoma  - Sarah score 7 (4+3)  - Grade group 3  - Extent: Involves 2 of 2 cores (2 mm, 10% and 1 mm, 10%)     H.  Prostate, left mid, biopsy:  - Prostatic  "adenocarcinoma  - Sarah score 7 (4+3)  - Grade group 3  - Extent: Involves 2 of 2 cores (2 mm, 20% and 3 mm, 20%)     I.  Prostate, left apex, biopsy:  - Prostatic adenocarcinoma  - Sarah score 7 (4+3)  - Grade group 3  - Extent: Involves 1 of 2 cores (3 mm, 10%)      MRI Prostate 6/1/2023  Size: 4.1 x 4.2 x 2.9 cm, 26 grams   IMPRESSION:  1.  Based on the most suspicious abnormality, this exam is  characterized as PIRADS 4 - Clinically significant cancer is likely to  be present.  The most suspicious abnormality is located at the right  mid gland peripheral zone at 7:00 and there is short segment capsular  abutment with low likelihood of minimal extraprostatic extension.  2.  There are 2 additional PIRADS 4 lesions as detailed above.  3.  The lesions approach the neurovascular bundles without direct  invasion.  4.  No suspicious adenopathy or evidence of pelvic metastases.    PHYSICAL EXAM  Patient is a 73 year old  male   Vitals: Blood pressure 90/60, pulse 57, height 1.676 m (5' 6\"), weight 60 kg (132 lb 3.2 oz), SpO2 96%.  General Appearance Adult: Body mass index is 21.34 kg/m .  Alert, no acute distress, oriented  Lungs: no respiratory distress, or pursed lip breathing  Abdomen: soft, nontender, no organomegaly or masses; ***  Back: *** CVAT  Neuro: Alert, oriented, speech and mentation normal  Psych: affect and mood normal  : {DELMY EXAM MALE GENITAL:811916}    Outside and Past Medical records:  Assessment requiring an independent historian(s) - {:943948}  Review of prior external note(s) from - {note reviewed source:382755}  Review of the result(s) of each unique test - ***    ASSESSMENT and PLAN  73 year old male with Sarah 4+3=7 prostate cancer, PIRADS 4 on MRI, PSA 4.61.       *** minutes spent on the date of the encounter doing chart review, history and exam, documentation and further activities as noted above.    Blas Causey MD  Urology  [facility]        CHIEF " COMPLAINT   It was my pleasure to see Duane C Johnson who is a 73 year old male for follow-up of prostate cancer.      HPI  Duane C Johnson is a very pleasant 73 year old male who presents with a history of Prostate Cancer.  PSA 4.61 on 3/13/2023. MRI with PIRADS 4 lesion. Now with Nash 4+3=7 disease on fusion biopsy.      He initially elected prostatectomy, however had MI on the day before surgery. Thus, he has yet to be treated and returns to discuss options. He has continued to have CHF with multiple hospitalizations and ED visits since.     PSA  1/25/2024 - 3.80  3/13/2023 - 4.61     Fusion Biopsy 9/12/2023  Final Diagnosis   A.  Prostate, target 1, biopsy:  - Prostatic adenocarcinoma  - Sarah score 6 (3+3)  - Grade Group 1  - Extent: Involves 2 of 2 cores (7 mm, 70% and 7 mm, 70%)  - Perineural invasion is present     B.  Prostate, target 2, biopsy:   - Prostatic adenocarcinoma  - Sarah score 7 (4+3)  - Grade group 3  - Extent: Involves 1 of 2 cores (3 mm, 20%)     C.  Prostate, target 3, biopsy:  - Benign prostate tissue     D.  Prostate, right base, biopsy:  - Prostatic adenocarcinoma  - Sarah score 6 (3+3)  - Grade Group 1  - Extent: Involves 1 of 2 cores (1 mm, 10%)     E.  Prostate, right mid, biopsy:  - Prostatic adenocarcinoma  - Sarah score 6 (3+3)  - Grade Group 1  - Extent: Involves 2 of 2 cores (7 mm, 70% and 3 mm, 25%)     F.  Prostate, right apex, biopsy:  - Prostatic adenocarcinoma  - Sarah score 6 (3+3)  - Grade Group 1  - Extent: Involves 2 of 2 cores (5 mm, 40% and 2 mm, 20%)     G.  Prostate, left base, biopsy:  - Prostatic adenocarcinoma  - Nash score 7 (4+3)  - Grade group 3  - Extent: Involves 2 of 2 cores (2 mm, 10% and 1 mm, 10%)     H.  Prostate, left mid, biopsy:  - Prostatic adenocarcinoma  - Sarah score 7 (4+3)  - Grade group 3  - Extent: Involves 2 of 2 cores (2 mm, 20% and 3 mm, 20%)     I.  Prostate, left apex, biopsy:  - Prostatic adenocarcinoma  - Nash score  "7 (4+3)  - Grade group 3  - Extent: Involves 1 of 2 cores (3 mm, 10%)      MRI Prostate 6/1/2023  Size: 4.1 x 4.2 x 2.9 cm, 26 grams   IMPRESSION:  1.  Based on the most suspicious abnormality, this exam is  characterized as PIRADS 4 - Clinically significant cancer is likely to  be present.  The most suspicious abnormality is located at the right  mid gland peripheral zone at 7:00 and there is short segment capsular  abutment with low likelihood of minimal extraprostatic extension.  2.  There are 2 additional PIRADS 4 lesions as detailed above.  3.  The lesions approach the neurovascular bundles without direct  invasion.  4.  No suspicious adenopathy or evidence of pelvic metastases.    PHYSICAL EXAM  Patient is a 73 year old  male   Vitals: Blood pressure 90/60, pulse 57, height 1.676 m (5' 6\"), weight 60 kg (132 lb 3.2 oz), SpO2 96%.  General Appearance Adult: Body mass index is 21.34 kg/m .  Alert, no acute distress, oriented  Lungs: no respiratory distress, or pursed lip breathing  Abdomen: soft, nontender, no organomegaly or masses  Back: no CVAT  Neuro: Alert, oriented, speech and mentation normal  Psych: affect and mood normal    Outside and Past Medical records:  Review of the result(s) of each unique test - PSA, pathology    ASSESSMENT and PLAN  73 year old male with Sarah 4+3=7 prostate cancer, PIRADS 4 on MRI, PSA 4.61 at diagnosis. We reviewed his diagnosis and treatment options. Given his recent cardiac history and risks of surgery, I would not advise prostatectomy at this point. With that in mind, we reviewed the option of radiotherapy and that overall this provides excellent disease control rates with lower risk of up-front complications. Will plan radiation oncology consultation and he will contact me if he has questions after this has been completed.    - Radiation oncology referral    20 minutes spent on the date of the encounter doing chart review, history and exam, documentation and further " activities as noted above.    Blas Causey MD  Urology  [facility]        Again, thank you for allowing me to participate in the care of your patient.      Sincerely,    Blas Causey MD

## 2024-01-25 NOTE — PROGRESS NOTES
CHIEF COMPLAINT   It was my pleasure to see Duane C Johnson who is a 73 year old male for follow-up of prostate cancer.      HPI  Duane C Johnson is a very pleasant 73 year old male who presents with a history of Prostate Cancer.  PSA 4.61 on 3/13/2023. MRI with PIRADS 4 lesion. Now with Sarah 4+3=7 disease on fusion biopsy.      He initially elected prostatectomy, however had MI on the day before surgery. Thus, he has yet to be treated and returns to discuss options. He has continued to have CHF with multiple hospitalizations and ED visits since.     PSA  1/25/2024 - 3.80  3/13/2023 - 4.61     Fusion Biopsy 9/12/2023  Final Diagnosis   A.  Prostate, target 1, biopsy:  - Prostatic adenocarcinoma  - Sarah score 6 (3+3)  - Grade Group 1  - Extent: Involves 2 of 2 cores (7 mm, 70% and 7 mm, 70%)  - Perineural invasion is present     B.  Prostate, target 2, biopsy:   - Prostatic adenocarcinoma  - Sarah score 7 (4+3)  - Grade group 3  - Extent: Involves 1 of 2 cores (3 mm, 20%)     C.  Prostate, target 3, biopsy:  - Benign prostate tissue     D.  Prostate, right base, biopsy:  - Prostatic adenocarcinoma  - [place] score 6 (3+3)  - Grade Group 1  - Extent: Involves 1 of 2 cores (1 mm, 10%)     E.  Prostate, right mid, biopsy:  - Prostatic adenocarcinoma  - [place] score 6 (3+3)  - Grade Group 1  - Extent: Involves 2 of 2 cores (7 mm, 70% and 3 mm, 25%)     F.  Prostate, right apex, biopsy:  - Prostatic adenocarcinoma  - Sarah score 6 (3+3)  - Grade Group 1  - Extent: Involves 2 of 2 cores (5 mm, 40% and 2 mm, 20%)     G.  Prostate, left base, biopsy:  - Prostatic adenocarcinoma  - [place] score 7 (4+3)  - Grade group 3  - Extent: Involves 2 of 2 cores (2 mm, 10% and 1 mm, 10%)     H.  Prostate, left mid, biopsy:  - Prostatic adenocarcinoma  - [place] score 7 (4+3)  - Grade group 3  - Extent: Involves 2 of 2 cores (2 mm, 20% and 3 mm, 20%)     I.  Prostate, left apex, biopsy:  - Prostatic adenocarcinoma  -  "Joes score 7 (4+3)  - Grade group 3  - Extent: Involves 1 of 2 cores (3 mm, 10%)      MRI Prostate 6/1/2023  Size: 4.1 x 4.2 x 2.9 cm, 26 grams   IMPRESSION:  1.  Based on the most suspicious abnormality, this exam is  characterized as PIRADS 4 - Clinically significant cancer is likely to  be present.  The most suspicious abnormality is located at the right  mid gland peripheral zone at 7:00 and there is short segment capsular  abutment with low likelihood of minimal extraprostatic extension.  2.  There are 2 additional PIRADS 4 lesions as detailed above.  3.  The lesions approach the neurovascular bundles without direct  invasion.  4.  No suspicious adenopathy or evidence of pelvic metastases.    PHYSICAL EXAM  Patient is a 73 year old  male   Vitals: Blood pressure 90/60, pulse 57, height 1.676 m (5' 6\"), weight 60 kg (132 lb 3.2 oz), SpO2 96%.  General Appearance Adult: Body mass index is 21.34 kg/m .  Alert, no acute distress, oriented  Lungs: no respiratory distress, or pursed lip breathing  Abdomen: soft, nontender, no organomegaly or masses  Back: no CVAT  Neuro: Alert, oriented, speech and mentation normal  Psych: affect and mood normal    Outside and Past Medical records:  Review of the result(s) of each unique test - PSA, pathology    ASSESSMENT and PLAN  73 year old male with Sarah 4+3=7 prostate cancer, PIRADS 4 on MRI, PSA 4.61 at diagnosis. We reviewed his diagnosis and treatment options. Given his recent cardiac history and risks of surgery, I would not advise prostatectomy at this point. With that in mind, we reviewed the option of radiotherapy and that overall this provides excellent disease control rates with lower risk of up-front complications. Will plan radiation oncology consultation and he will contact me if he has questions after this has been completed.    - Radiation oncology referral    20 minutes spent on the date of the encounter doing chart review, history and exam, documentation " and further activities as noted above.    Blas Causey MD  Urology  [facility]

## 2024-01-25 NOTE — PROGRESS NOTES
New Patient Radiation Oncology Nurse Navigator Note     Referring provider:   Blas Causey MD  [facility] [place] [phone]     Referred to (specialty): Radiation Oncology    Requested provider (if applicable):   [facility] [phone]     Date Referral Received:   24     Evaluation for :   prostate cancer     Clinical History (per Nurse review of records provided):    Patient with PSA 4.61 on 3/13/2023. MRI with PIRADS 4 lesion. Now with Sarah 4+3=7 disease on fusion biopsy.    Prior plan had been made for Schedule for robotic prostatectomy with pelvic lymphadenectomy, however this was never done due to cardiac issues/arrest and hospitalization just prior to planned surgery in 2023.    Pertinent urology notes, pathology/labs & imaging bookmarked**        Records Location (Care Everywhere, Media, etc.):   Epic     Previous radiation treatment:   No    I called Duane, to discuss referral to oncology.  I introduced my role and reviewed what this consult visit will entail/what to expect.  I reviewed the location and gave contact numbers including new patient scheduling and clinic phone numbers.   Duane, has no other questions at this time.    I forwarded on referral with scheduling instructions for the following     PLAN: SCHEDULE: First available with rad onc for prostate cancer @ [facility], in person    I transferred call to our new patient scheduling to finalize appointment.    Dee Dee Gutierrez, RN, BSN  Oncology New Patient Nurse Navigator   [facility]  [phone]

## 2024-01-26 ENCOUNTER — HOSPITAL ENCOUNTER (OUTPATIENT)
Dept: CARDIAC REHAB | Facility: CLINIC | Age: 74
Discharge: HOME | End: 2024-01-26
Attending: INTERNAL MEDICINE
Payer: MEDICARE

## 2024-01-26 PROCEDURE — 93798 PHYS/QHP OP CAR RHAB W/ECG: CPT

## 2024-01-26 RX ORDER — MAGNESIUM OXIDE 400 MG/1
400 TABLET ORAL DAILY
Qty: 90 TABLET | Refills: 3 | Status: ON HOLD | OUTPATIENT
Start: 2024-01-26

## 2024-01-29 ENCOUNTER — HOSPITAL ENCOUNTER (OUTPATIENT)
Dept: CARDIAC REHAB | Facility: CLINIC | Age: 74
Discharge: HOME | End: 2024-01-29
Attending: INTERNAL MEDICINE
Payer: MEDICARE

## 2024-01-29 PROCEDURE — 93798 PHYS/QHP OP CAR RHAB W/ECG: CPT

## 2024-01-29 NOTE — TELEPHONE ENCOUNTER
MEDICAL RECORDS REQUEST   Radiation Oncology  [address] [place] [address]  Fax: [phone]          FUTURE VISIT INFORMATION                                                   Duane C Johnson, : 1950 scheduled for future visit at [facility]    RECORDS REQUESTED FOR VISIT                                                     PROSTATE     OFFICE NOTE from urologist [facility] 24: Dr. Blas Causey   COLONOSCOPY [facility] 23   OPERATIVE/BIOPSY REPORTS (include if prostate was removed) Epic 23: prostate biopsy   PSA LABS (within 5 years) Epic Most recent 24   MEDICATION LIST [facility]    LABS     PATHOLOGY REPORTS Report in EPIC 23: [id number]   ANYTHING RELATED TO DIAGNOSIS Epic Most recent 24   IMAGING (NEED IMAGES & REPORT)     MRI PACS 23: MR Prostate

## 2024-01-31 ENCOUNTER — HOSPITAL ENCOUNTER (OUTPATIENT)
Dept: CARDIAC REHAB | Facility: CLINIC | Age: 74
Discharge: HOME | End: 2024-01-31
Attending: INTERNAL MEDICINE
Payer: MEDICARE

## 2024-01-31 PROCEDURE — 93797 PHYS/QHP OP CAR RHAB WO ECG: CPT | Mod: 59

## 2024-01-31 PROCEDURE — 93798 PHYS/QHP OP CAR RHAB W/ECG: CPT

## 2024-02-01 DIAGNOSIS — I50.20 HEART FAILURE WITH REDUCED EJECTION FRACTION, NYHA CLASS I (H): Primary | ICD-10-CM

## 2024-02-02 ENCOUNTER — OFFICE VISIT (OUTPATIENT)
Dept: RADIATION THERAPY | Facility: OUTPATIENT CENTER | Age: 74
End: 2024-02-02
Attending: UROLOGY
Payer: MEDICARE

## 2024-02-02 ENCOUNTER — PRE VISIT (OUTPATIENT)
Dept: RADIATION THERAPY | Facility: OUTPATIENT CENTER | Age: 74
End: 2024-02-02

## 2024-02-02 VITALS
TEMPERATURE: 97.8 F | HEIGHT: 66 IN | SYSTOLIC BLOOD PRESSURE: 88 MMHG | BODY MASS INDEX: 21.89 KG/M2 | WEIGHT: 136.2 LBS | DIASTOLIC BLOOD PRESSURE: 60 MMHG | RESPIRATION RATE: 18 BRPM | HEART RATE: 54 BPM | OXYGEN SATURATION: 97 %

## 2024-02-02 DIAGNOSIS — C61 PROSTATE CANCER (H): ICD-10-CM

## 2024-02-02 RX ORDER — ESCITALOPRAM OXALATE 10 MG/1
10 TABLET ORAL DAILY
Status: ON HOLD | COMMUNITY
End: 2024-09-06

## 2024-02-02 ASSESSMENT — PAIN SCALES - GENERAL: PAINLEVEL: NO PAIN (0)

## 2024-02-02 NOTE — NURSING NOTE
"REASON FOR APPOINTMENT   Type of Cancer: Prostate Cancer  Location: Prostate  Date of Symptom Onset: 3/13/23     TREATMENT TO-DATE FOR THIS CANCER  Surgery ? no   Chemotherapy ? no   Other Treatments for this Cancer ? no    PERSONAL HISTORY OF CANCER   Previous Cancer ? no   Prior Radiation ? no   Prior Chemotherapy ? no   Prior Hormonal Therapy ? no     REFERRALS NEEDED  no    VITALS  BP (!) 88/60   Pulse 54   Temp 97.8  F (36.6  C) (Oral)   Resp 18   Ht 1.676 m (5' 6\")   Wt 61.8 kg (136 lb 3.2 oz)   SpO2 97%   BMI 21.98 kg/m      PACEMAKER/IMPLANTED CARDIAC DEVICE no    PAIN  Denies    PSYCHOSOCIAL  Marital Status:   Patient lives in home with spouse.  Number of children: unknown  Working status: Retired  Do you feel safe in your home? Yes    REVIEW OF SYSTEMS  Skin: negative  Eyes: positive for reading glasses  Ears/Nose/Throat: negative  Respiratory: Dyspnea on exertion, denies cough  Cardiovascular: positive for hypotension  Gastrointestinal: negative  Genitourinary: negative  Musculoskeletal: negative  Neurologic: negative  Psychiatric: negative  Hematologic/Lymphatic/Immunologic: negative  Endocrine: negative    Radiation Oncology Patient Teaching    Current Concern: Prostate Cancer    Person involved with teaching: Patient and Wife  Patient asked Questions: Yes  Patient was cooperative: Yes  Patient was receptive (willing to accept information given): Yes    Education Assessment  Comprehension ability: High  Knowledge level: Medium  Factors affecting teaching: None    Education Materials Given  Radiation Therapy and You, bowel/bladder prep, nutrition/diet, fiducial markers, spaceOAR, side effects-fatigue, diarrhea    Educational Topics Discussed  Side effects- see above    Response To Teaching  Verbalizes understanding    Do you have an advanced directive or living will? No  Are you DNR/DNI? No    Latoya Vuong RN   "

## 2024-02-02 NOTE — PROGRESS NOTES
IDENTIFICATION: Duane C Johnson is a 74 year old gentleman with significant cardiovascular disease diagnosed with [place] 4+3, PSA 4.61 unfavorable intermediate risk prostate cancer referred for definitive management with radiation therapy.     HISTORY OF PRESENT ILLNESS:  Duane C Johnson is a 74 year old gentleman with significant smoking history (.25ppd x 30years, quit 10/26/23) and multiple cardiac comorbidities, recently diagnosed with high volume intermediate risk prostate cancer referred to us by Dr. Ruiz.  He was initially seen by Dr. Ruiz back in 2023 for having an slightly elevated PSA.  Relevant PSA findings include the following:     Prostate Specific Antigen Screen   Date Value Ref Range Status   03/13/2023 4.61 0.00 - 6.50 ng/mL Final     PSA Tumor Marker   Date Value Ref Range Status   01/25/2024 3.80 0.00 - 4.00 ug/L Final       On June 1, 2023 prostate MRI showed 26 gram prostate. PIRADS 4 lesion located at the right mid gland peripheral zone at 7:00 and there is short segment capsular abutment with low likelihood of minimal extraprostatic extension. There are 2 additional PIRADS 4 lesions.  The lesions approach the neurovascular bundles without direct invasion. No suspicious adenopathy or evidence of pelvic metastases.    On September 12, 2023, Dr. Ruiz completed a biopsy revealing Sarah 4+3 high-volume disease. Per Urology NATHALIA revealed normal prostate 20ml prostate.     He initially elected prostatectomy, however had MI on 10/26/23, the day before scheduled surgery.  His surgery was canceled and he has continued to have CHF with multiple hospitalizations and ED visits since. He also has had aspiration pneumonia. Currently he is going to cardiac rehab. En light of recent events deemed to be a poor surgical candidate and recommended to have external beam radiation therapy.     Today he states that he is feeling in his normal state of health.  AUA score is 1; frequency x 1.  Hanna score is 3.  He  denies any pain. He has normal bowel movements without blood or diarrhea. He had a colonoscopy March 2023.      REVIEW OF SYSTEMS: A 10-point review of systems was obtained. Pertinent findings are noted in the HPI and are otherwise unremarkable.      CHEMOTHERAPY HISTORY: none     RADIATION THERAPY HISTORY:  none     IBD/PACEMAKER/CTD: none.     Blood disorders/thinners:  On Eliquis      Past Medical History:   Diagnosis Date    Benign essential hypertension     CAD (coronary artery disease)     Chronic systolic heart failure (H)     Hypertension     ST elevation myocardial infarction (STEMI), unspecified artery (H)     Ventricular fibrillation (H)        Past Surgical History:   Procedure Laterality Date    COLONOSCOPY N/A 3/23/2023    Procedure: COLONOSCOPY, FLEXIBLE, WITH LESION REMOVAL USING SNARE;  Surgeon: Jose Faustin MD;  [facility]: [facility]    CV CENTRAL VENOUS CATHETER PLACEMENT N/A 10/26/2023    Procedure: Central Venous Catheter Placement;  Surgeon: Rob Lyles MD;  Location:  [facility] CARDIAC CATH LAB    CV CORONARY ANGIOGRAM N/A 10/27/2023    Procedure: Coronary Angiogram;  Surgeon: Rob Lyles MD;  Location:  HEART CARDIAC CATH LAB    CV CORONARY ANGIOGRAM N/A 10/26/2023    Procedure: Coronary Angiogram;  Surgeon: Rob Lyles MD;  Location:  HEART CARDIAC CATH LAB    CV LEFT HEART CATH N/A 10/26/2023    Procedure: Left Heart Catheterization;  Surgeon: Rob Lyles MD;  Location:  HEART CARDIAC CATH LAB    CV PCI N/A 10/27/2023    Procedure: Percutaneous Coronary Intervention;  Surgeon: Rob Lyles MD;  Location:  HEART CARDIAC CATH LAB    CV PCI STENT DRUG ELUTING N/A 10/26/2023    Procedure: Percutaneous Coronary Intervention Stent;  Surgeon: Rob Lyles MD;  Location:  HEART CARDIAC CATH LAB       Family History   Problem Relation Age of Onset    Other Cancer Mother     Obesity Mother     GI problems Father     Uterine  "Cancer Sister        Social History     Tobacco Use    Smoking status: Former     Packs/day: 0.25     Years: 30.00     Additional pack years: 0.00     Total pack years: 7.50     Types: Cigarettes     Quit date: 10/26/2023     Years since quittin.2     Passive exposure: Current    Smokeless tobacco: Never    Tobacco comments:     Quit 10/26/2023   Substance Use Topics    Alcohol use: Yes     Comment: 1-2 beers per day     Current Outpatient Medications   Medication    amiodarone (PACERONE) 200 MG tablet    apixaban ANTICOAGULANT (ELIQUIS) 5 MG tablet    atorvastatin (LIPITOR) 80 MG tablet    bumetanide (BUMEX) 1 MG tablet    cetirizine (ZYRTEC) 10 MG tablet    clopidogrel (PLAVIX) 75 MG tablet    escitalopram (LEXAPRO) 10 MG tablet    fish oil-omega-3 fatty acids 1000 MG capsule    lisinopril (ZESTRIL) 2.5 MG tablet    melatonin 5 MG tablet    multivitamin w/minerals (THERA-VIT-M) tablet    potassium chloride ER (KLOR-CON M) 20 MEQ CR tablet    vitamin C (ASCORBIC ACID) 1000 MG TABS    acetaminophen (TYLENOL) 325 MG tablet    hydrOXYzine HCl (ATARAX) 25 MG tablet    magnesium oxide (MAG-OX) 400 MG tablet     No current facility-administered medications for this visit.        Allergies   Allergen Reactions    Brilinta [Ticagrelor] Other (See Comments) and Difficulty breathing     Per pt and spouse, hyperventilation.    Clonazepam Other (See Comments)     Per spouse, acted like a zombie and he was shaky, could barely talk.     PHYSICAL EXAM:  BP (!) 88/60   Pulse 54   Temp 97.8  F (36.6  C) (Oral)   Resp 18   Ht 1.676 m (5' 6\")   Wt 61.8 kg (136 lb 3.2 oz)   SpO2 97%   BMI 21.98 kg/m    GEN: appears well, in no acute distress  HEENT: normocephalic and atraumatic, EOMI, anicteric sclerae  CV: RRR  RESP: normal respiration on room air, no stridor, CTAB  ABDOMEN: soft,  SKIN: normal color and turgor  MSK: moving all extremities well  RECTAL: Per Urology 20cc prostate, NATHALIA revealed normal prostate.   LYMPH: No " supraclavicular, infraclavicular, or inguinal lymphadenopathy appreciated bilaterally  NEURO: no gross neurologic deficit  PSYCH: appropriate mood, affect, and judgment     ECOG PERFORMANCE STATUS: 0     All pertinent laboratory, imaging, and pathology findings have been reviewed.      IMPRESSION AND RECOMMENDATIONS: Duane C Johnson is a 74 year old gentleman with significant cardiovascular disease diagnosed with Sarah 4+3, PSA 4.61 high volume unfavorable intermediate risk prostate cancer referred for definitive management with radiation therapy.  [facility] nomogram estimates organ confined disease 20%, extracapsular extension 78%, lymph node involvement 24%, seminal vesicle invasion 31%.    He is no longer surgical candidate given his MI and comorbidities. Therefore today I thoroughly discussed the logistics, risks, and benefits of EBRT, which would involve approximately 5.5 to 8 weeks of daily treatments, with the possibility of fiducial marker placement prior to treatment. I also discussed ADT and the potential length of ADT.  I will refer him to medical oncology for further discussion given his cardiovascular disease.  As he has high risk features, I did not recommend surveillance.      He was previously seen by urology, and deemed to be a poor candidate for surgery. I agree with EBRT   ADT is an appropriate treatment option for him. I explained that having gold seed fiducial markers helps us greatly in the delivery of radiation and enables us to use high quality image guided radiation therapy and smaller margins of error to decrease the dose of radiation to normal tissue, which should translate to a better side effects profile. Space OAR helps to decrease dose to the rectum which would be particularly helpful in him as he is on Eloquis.  Anticoagulation tends to increase the chance of radiation proctitis.     The risks, benefits, alternatives, and logistics to radiation therapy were discussed in detail with the  patient and his wife. He is aware that the side effects of radiation therapy may be severe and permanent, although we expect that such risks would be low and that they are outweighed by the benefit of treatment. He was given the opportunity to ask questions, which were answered. After this discussion, he felt comfortable proceeding with EBRT.  Informed consent was obtained    Additional problem list to be addressed in the following manner:   Patient also needs further staging which will include abdomen pelvic MRI, bone scan.  Ordered today  Will also refer to medical oncology for consideration of ADT.  Will refer back to urology for fiducial and SpaceOAR when appropriate.  CT simulation to be completed 7 to 10 days after fiducial placement.      There was ample time for questions and all were answered to the patient's satisfaction. Thank you for allowing me to participate in the care of this pleasant patient. If you have any questions, please do not hesitate to contact my office.    Sincerely,  Ryan Martínez MD

## 2024-02-02 NOTE — LETTER
2/2/2024         RE: Duane C Johnson  [address] [address] [place] [address]        Dear Colleague,    Thank you for referring your patient, Duane C Johnson, to the [facility] RADI[facility]. Please see a copy of my visit note below.    IDENTIFICATION: Duane C Johnson is a 74 year old gentleman with significant cardiovascular disease diagnosed with [place] 4+3, PSA 4.61 unfavorable intermediate risk prostate cancer referred for definitive management with radiation therapy.     HISTORY OF PRESENT ILLNESS:  Duane C Johnson is a 74 year old gentleman with significant smoking history (.25ppd x 30years, quit 10/26/23) and multiple cardiac comorbidities, recently diagnosed with high volume intermediate risk prostate cancer referred to us by Dr. Ruiz.  He was initially seen by Dr. Ruiz back in 2023 for having an slightly elevated PSA.  Relevant PSA findings include the following:     Prostate Specific Antigen Screen   Date Value Ref Range Status   03/13/2023 4.61 0.00 - 6.50 ng/mL Final     PSA Tumor Marker   Date Value Ref Range Status   01/25/2024 3.80 0.00 - 4.00 ug/L Final       On June 1, 2023 prostate MRI showed 26 gram prostate. PIRADS 4 lesion located at the right mid gland peripheral zone at 7:00 and there is short segment capsular abutment with low likelihood of minimal extraprostatic extension. There are 2 additional PIRADS 4 lesions.  The lesions approach the neurovascular bundles without direct invasion. No suspicious adenopathy or evidence of pelvic metastases.    On September 12, 2023, Dr. Ruiz completed a biopsy revealing Sarah 4+3 high-volume disease. Per Urology NATHALIA revealed normal prostate 20ml prostate.     He initially elected prostatectomy, however had MI on 10/26/23, the day before scheduled surgery.  His surgery was canceled and he has continued to have CHF with multiple hospitalizations and ED visits since. He also has had aspiration pneumonia. Currently he is going to cardiac  rehab. En light of recent events deemed to be a poor surgical candidate and recommended to have external beam radiation therapy.     Today he states that he is feeling in his normal state of health.  AUA score is 1; frequency x 1.  Hanna score is 3.  He denies any pain. He has normal bowel movements without blood or diarrhea. He had a colonoscopy March 2023.      REVIEW OF SYSTEMS: A 10-point review of systems was obtained. Pertinent findings are noted in the HPI and are otherwise unremarkable.      CHEMOTHERAPY HISTORY: none     RADIATION THERAPY HISTORY:  none     IBD/PACEMAKER/CTD: none.     Blood disorders/thinners:  On Eliquis      Past Medical History:   Diagnosis Date     Benign essential hypertension      CAD (coronary artery disease)      Chronic systolic heart failure (H)      Hypertension      ST elevation myocardial infarction (STEMI), unspecified artery (H)      Ventricular fibrillation (H)        Past Surgical History:   Procedure Laterality Date     COLONOSCOPY N/A 3/23/2023    Procedure: COLONOSCOPY, FLEXIBLE, WITH LESION REMOVAL USING SNARE;  Surgeon: Jose Faustin MD;  [facility]: [facility]     CV CENTRAL VENOUS CATHETER PLACEMENT N/A 10/26/2023    Procedure: Central Venous Catheter Placement;  Surgeon: Rob Lyles MD;  Location: [facility] CARDIAC CATH LAB     CV CORONARY ANGIOGRAM N/A 10/27/2023    Procedure: Coronary Angiogram;  Surgeon: Rob Lyles MD;  Location: [facility] CARDIAC CATH LAB     CV CORONARY ANGIOGRAM N/A 10/26/2023    Procedure: Coronary Angiogram;  Surgeon: Rob Lyles MD;  Location: [facility] CARDIAC CATH LAB     CV LEFT HEART CATH N/A 10/26/2023    Procedure: Left Heart Catheterization;  Surgeon: Rob Lyles MD;  Location: [facility] CARDIAC CATH LAB     CV PCI N/A 10/27/2023    Procedure: Percutaneous Coronary Intervention;  Surgeon: Rob Lyles MD;  Location: [facility] CARDIAC CATH LAB     CV PCI STENT DRUG ELUTING N/A  "10/26/2023    Procedure: Percutaneous Coronary Intervention Stent;  Surgeon: Rob Lyles MD;  Location:  HEART CARDIAC CATH LAB       Family History   Problem Relation Age of Onset     Other Cancer Mother      Obesity Mother      GI problems Father      Uterine Cancer Sister        Social History     Tobacco Use     Smoking status: Former     Packs/day: 0.25     Years: 30.00     Additional pack years: 0.00     Total pack years: 7.50     Types: Cigarettes     Quit date: 10/26/2023     Years since quittin.2     Passive exposure: Current     Smokeless tobacco: Never     Tobacco comments:     Quit 10/26/2023   Substance Use Topics     Alcohol use: Yes     Comment: 1-2 beers per day     Current Outpatient Medications   Medication     amiodarone (PACERONE) 200 MG tablet     apixaban ANTICOAGULANT (ELIQUIS) 5 MG tablet     atorvastatin (LIPITOR) 80 MG tablet     bumetanide (BUMEX) 1 MG tablet     cetirizine (ZYRTEC) 10 MG tablet     clopidogrel (PLAVIX) 75 MG tablet     escitalopram (LEXAPRO) 10 MG tablet     fish oil-omega-3 fatty acids 1000 MG capsule     lisinopril (ZESTRIL) 2.5 MG tablet     melatonin 5 MG tablet     multivitamin w/minerals (THERA-VIT-M) tablet     potassium chloride ER (KLOR-CON M) 20 MEQ CR tablet     vitamin C (ASCORBIC ACID) 1000 MG TABS     acetaminophen (TYLENOL) 325 MG tablet     hydrOXYzine HCl (ATARAX) 25 MG tablet     magnesium oxide (MAG-OX) 400 MG tablet     No current facility-administered medications for this visit.        Allergies   Allergen Reactions     Brilinta [Ticagrelor] Other (See Comments) and Difficulty breathing     Per pt and spouse, hyperventilation.     Clonazepam Other (See Comments)     Per spouse, acted like a zombie and he was shaky, could barely talk.     PHYSICAL EXAM:  BP (!) 88/60   Pulse 54   Temp 97.8  F (36.6  C) (Oral)   Resp 18   Ht 1.676 m (5' 6\")   Wt 61.8 kg (136 lb 3.2 oz)   SpO2 97%   BMI 21.98 kg/m    GEN: appears well, in no acute " distress  HEENT: normocephalic and atraumatic, EOMI, anicteric sclerae  CV: RRR  RESP: normal respiration on room air, no stridor, CTAB  ABDOMEN: soft,  SKIN: normal color and turgor  MSK: moving all extremities well  RECTAL: Per Urology 20cc prostate, NATHALIA revealed normal prostate.   LYMPH: No supraclavicular, infraclavicular, or inguinal lymphadenopathy appreciated bilaterally  NEURO: no gross neurologic deficit  PSYCH: appropriate mood, affect, and judgment     ECOG PERFORMANCE STATUS: 0     All pertinent laboratory, imaging, and pathology findings have been reviewed.      IMPRESSION AND RECOMMENDATIONS: Duane C Johnson is a 74 year old gentleman with significant cardiovascular disease diagnosed with Sarah 4+3, PSA 4.61 high volume unfavorable intermediate risk prostate cancer referred for definitive management with radiation therapy.  [facility] nomogram estimates organ confined disease 20%, extracapsular extension 78%, lymph node involvement 24%, seminal vesicle invasion 31%.    He is no longer surgical candidate given his MI and comorbidities. Therefore today I thoroughly discussed the logistics, risks, and benefits of EBRT, which would involve approximately 5.5 to 8 weeks of daily treatments, with the possibility of fiducial marker placement prior to treatment. I also discussed ADT and the potential length of ADT.  I will refer him to medical oncology for further discussion given his cardiovascular disease.  As he has high risk features, I did not recommend surveillance.      He was previously seen by urology, and deemed to be a poor candidate for surgery. I agree with EBRT   ADT is an appropriate treatment option for him. I explained that having gold seed fiducial markers helps us greatly in the delivery of radiation and enables us to use high quality image guided radiation therapy and smaller margins of error to decrease the dose of radiation to normal tissue, which should translate to a better side effects  profile. Space OAR helps to decrease dose to the rectum which would be particularly helpful in him as he is on Eloquis.  Anticoagulation tends to increase the chance of radiation proctitis.     The risks, benefits, alternatives, and logistics to radiation therapy were discussed in detail with the patient and his wife. He is aware that the side effects of radiation therapy may be severe and permanent, although we expect that such risks would be low and that they are outweighed by the benefit of treatment. He was given the opportunity to ask questions, which were answered. After this discussion, he felt comfortable proceeding with EBRT.  Informed consent was obtained    Additional problem list to be addressed in the following manner:   Patient also needs further staging which will include abdomen pelvic MRI, bone scan.  Ordered today  Will also refer to medical oncology for consideration of ADT.  Will refer back to urology for fiducial and SpaceOAR when appropriate.  CT simulation to be completed 7 to 10 days after fiducial placement.      There was ample time for questions and all were answered to the patient's satisfaction. Thank you for allowing me to participate in the care of this pleasant patient. If you have any questions, please do not hesitate to contact my office.    Sincerely,  Ryan Martínez MD

## 2024-02-05 ENCOUNTER — HOSPITAL ENCOUNTER (OUTPATIENT)
Dept: CARDIAC REHAB | Facility: CLINIC | Age: 74
Discharge: HOME | End: 2024-02-05
Attending: INTERNAL MEDICINE
Payer: MEDICARE

## 2024-02-05 PROCEDURE — 93798 PHYS/QHP OP CAR RHAB W/ECG: CPT

## 2024-02-06 ENCOUNTER — PATIENT OUTREACH (OUTPATIENT)
Dept: ONCOLOGY | Facility: CLINIC | Age: 74
End: 2024-02-06
Payer: MEDICARE

## 2024-02-06 NOTE — PROGRESS NOTES
[facility]: Cancer Care                                                                   Hem/Onc  Referral reviewed    Adult Oncology/Hematology  Referral [[id number]]  Ordering user: Nanette Martínez MD 02/02/24 1321     Referral Details    Referred By  Referred To   Nanette Martínez MD   [address]   [facility] [address]   Phone: [phone]   Fax: [phone]    Diagnoses: Prostate cancer (H)   Order: Adult Oncology/Hematology  Referral    Medical Oncology     Order Questions    Question Answer   My Clinical Question Is: 75yo M w/ significant cardiovascular disease diagnosed with Sarah 4+3, PSA 4.61 high volume unfavorable intermediate risk prostate cancer referred for ADT        ASSESSMENT      Clinical History (per Nurse review of records provided):    74 year old male patient referred as above  Records Location: Epic   Referring provider note(s)-- BOOKMARKED    INTERVENTION(S)                                                      -Referral Triage Scheduling Instructions added to Hem/Onc  referral for Patient Access rep    February 8, 2024 OUTGOING CALL to pt:  Introduced my role as nurse navigator [email or web address] and that we have recd the referral  from Dr Martínez  Pt confirms they are aware of the referral and ready to schedule    PLAN                                                      Medical Oncology consult after upcoming MRI and total bone scan, scheduled later this month.    Future Appointments   Date Time Provider Department Center   2/9/2024  3:00 PM [facility] 1 [facility]   2/12/2024  3:00 PM [facility]   2/14/2024  3:00 PM [facility]   2/15/2024  8:15 PM [facility]   2/15/2024  9:00 PM [facility]   2/16/2024  3:00 PM [facility] 1 [facility]   2/19/2024  1:30 PM  CRITICAL CARE [facility]   2/19/2024  3:00 PM [facility]   2/21/2024  3:00 PM [facility] 1 [facility]   2/22/2024  8:00 AM [id number] [facility] [place] [facility]   2/23/2024  3:00 PM [facility] 1 [facility]   2/26/2024  3:00 PM [facility]   3/5/2024  1:00 PM Adama Youngblood MD [facility]   3/6/2024  1:15 PM  LAB [facility]   3/6/2024  1:45 PM Sirisha Arias APRN [facility]   3/12/2024 10:00 AM Steve Dixon DO [facility]   4/12/2024 11:00 AM [facility]   4/15/2024  4:15 PM Ham Cruz MD [facility]   4/18/2024  8:00 AM Sedrick Lehman, PhD [facility]         See records team's Pre-Visit encounter documentation for additional records retrieval information.    Allison Thomson, RN, BSN, OCN  Hematology/Oncology New Patient Nurse Navigator   [facility] Cancer [facility]  [phone]

## 2024-02-07 ENCOUNTER — PATIENT OUTREACH (OUTPATIENT)
Dept: CARE COORDINATION | Facility: CLINIC | Age: 74
End: 2024-02-07

## 2024-02-07 ENCOUNTER — OFFICE VISIT (OUTPATIENT)
Dept: CARDIOLOGY | Facility: CLINIC | Age: 74
End: 2024-02-07
Attending: INTERNAL MEDICINE
Payer: MEDICARE

## 2024-02-07 ENCOUNTER — LAB (OUTPATIENT)
Dept: LAB | Facility: CLINIC | Age: 74
End: 2024-02-07
Payer: MEDICARE

## 2024-02-07 VITALS
BODY MASS INDEX: 22.37 KG/M2 | HEART RATE: 61 BPM | WEIGHT: 138.6 LBS | DIASTOLIC BLOOD PRESSURE: 60 MMHG | SYSTOLIC BLOOD PRESSURE: 92 MMHG | OXYGEN SATURATION: 96 %

## 2024-02-07 DIAGNOSIS — I10 PRIMARY HYPERTENSION: Primary | ICD-10-CM

## 2024-02-07 DIAGNOSIS — E78.2 MIXED HYPERLIPIDEMIA: ICD-10-CM

## 2024-02-07 DIAGNOSIS — I50.20 HEART FAILURE WITH REDUCED EJECTION FRACTION, NYHA CLASS I (H): ICD-10-CM

## 2024-02-07 DIAGNOSIS — I25.10 CORONARY ARTERY DISEASE INVOLVING NATIVE CORONARY ARTERY OF NATIVE HEART WITHOUT ANGINA PECTORIS: ICD-10-CM

## 2024-02-07 DIAGNOSIS — I50.42 CHRONIC COMBINED SYSTOLIC AND DIASTOLIC HEART FAILURE (H): Primary | ICD-10-CM

## 2024-02-07 DIAGNOSIS — I25.5 ISCHEMIC CARDIOMYOPATHY: ICD-10-CM

## 2024-02-07 DIAGNOSIS — I95.9 HYPOTENSION, UNSPECIFIED HYPOTENSION TYPE: ICD-10-CM

## 2024-02-07 DIAGNOSIS — I46.9 CARDIAC ARREST (H): ICD-10-CM

## 2024-02-07 LAB
ALBUMIN SERPL BCG-MCNC: 4.1 G/DL (ref 3.5–5.2)
ALP SERPL-CCNC: 95 U/L (ref 40–150)
ALT SERPL W P-5'-P-CCNC: 29 U/L (ref 0–70)
ANION GAP SERPL CALCULATED.3IONS-SCNC: 9 MMOL/L (ref 7–15)
AST SERPL W P-5'-P-CCNC: 35 U/L (ref 0–45)
BILIRUB SERPL-MCNC: 0.6 MG/DL
BUN SERPL-MCNC: 20 MG/DL (ref 8–23)
CALCIUM SERPL-MCNC: 9.3 MG/DL (ref 8.8–10.2)
CHLORIDE SERPL-SCNC: 96 MMOL/L (ref 98–107)
CHOLEST SERPL-MCNC: 110 MG/DL
CREAT SERPL-MCNC: 1.01 MG/DL (ref 0.67–1.17)
DEPRECATED HCO3 PLAS-SCNC: 29 MMOL/L (ref 22–29)
EGFRCR SERPLBLD CKD-EPI 2021: 78 ML/MIN/1.73M2
ERYTHROCYTE [DISTWIDTH] IN BLOOD BY AUTOMATED COUNT: 14.4 % (ref 10–15)
FASTING STATUS PATIENT QL REPORTED: YES
GLUCOSE SERPL-MCNC: 104 MG/DL (ref 70–99)
HCT VFR BLD AUTO: 37.1 % (ref 40–53)
HDLC SERPL-MCNC: 47 MG/DL
HGB BLD-MCNC: 12.4 G/DL (ref 13.3–17.7)
LDLC SERPL CALC-MCNC: 51 MG/DL
MAGNESIUM SERPL-MCNC: 2.1 MG/DL (ref 1.7–2.3)
MCH RBC QN AUTO: 30.6 PG (ref 26.5–33)
MCHC RBC AUTO-ENTMCNC: 33.4 G/DL (ref 31.5–36.5)
MCV RBC AUTO: 92 FL (ref 78–100)
NONHDLC SERPL-MCNC: 63 MG/DL
NT-PROBNP SERPL-MCNC: 3994 PG/ML (ref 0–900)
PLATELET # BLD AUTO: 289 10E3/UL (ref 150–450)
POTASSIUM SERPL-SCNC: 4.5 MMOL/L (ref 3.4–5.3)
PROT SERPL-MCNC: 7.4 G/DL (ref 6.4–8.3)
RBC # BLD AUTO: 4.05 10E6/UL (ref 4.4–5.9)
SODIUM SERPL-SCNC: 134 MMOL/L (ref 135–145)
TRIGL SERPL-MCNC: 60 MG/DL
WBC # BLD AUTO: 5.3 10E3/UL (ref 4–11)

## 2024-02-07 PROCEDURE — 93010 ELECTROCARDIOGRAM REPORT: CPT | Performed by: INTERNAL MEDICINE

## 2024-02-07 PROCEDURE — 83880 ASSAY OF NATRIURETIC PEPTIDE: CPT | Performed by: PATHOLOGY

## 2024-02-07 PROCEDURE — 93005 ELECTROCARDIOGRAM TRACING: CPT

## 2024-02-07 PROCEDURE — 80053 COMPREHEN METABOLIC PANEL: CPT | Performed by: PATHOLOGY

## 2024-02-07 PROCEDURE — 85027 COMPLETE CBC AUTOMATED: CPT | Performed by: PATHOLOGY

## 2024-02-07 PROCEDURE — 99215 OFFICE O/P EST HI 40 MIN: CPT | Performed by: INTERNAL MEDICINE

## 2024-02-07 PROCEDURE — 36415 COLL VENOUS BLD VENIPUNCTURE: CPT | Performed by: PATHOLOGY

## 2024-02-07 PROCEDURE — G0463 HOSPITAL OUTPT CLINIC VISIT: HCPCS | Performed by: INTERNAL MEDICINE

## 2024-02-07 PROCEDURE — 83735 ASSAY OF MAGNESIUM: CPT | Performed by: PATHOLOGY

## 2024-02-07 PROCEDURE — 80061 LIPID PANEL: CPT | Performed by: PATHOLOGY

## 2024-02-07 RX ORDER — POTASSIUM CHLORIDE 1500 MG/1
20 TABLET, EXTENDED RELEASE ORAL DAILY
Qty: 30 TABLET | Refills: 5 | Status: SHIPPED | OUTPATIENT
Start: 2024-02-07 | End: 2024-08-07

## 2024-02-07 RX ORDER — LISINOPRIL 2.5 MG/1
1.25 TABLET ORAL DAILY
Qty: 45 TABLET | Refills: 1 | Status: SHIPPED | OUTPATIENT
Start: 2024-02-07 | End: 2024-05-20

## 2024-02-07 RX ORDER — METOPROLOL SUCCINATE 25 MG/1
12.5 TABLET, EXTENDED RELEASE ORAL DAILY
Qty: 45 TABLET | Refills: 1 | Status: SHIPPED | OUTPATIENT
Start: 2024-02-07 | End: 2024-07-05

## 2024-02-07 ASSESSMENT — PAIN SCALES - GENERAL: PAINLEVEL: NO PAIN (0)

## 2024-02-07 ASSESSMENT — ACTIVITIES OF DAILY LIVING (ADL): DEPENDENT_IADLS:: INDEPENDENT

## 2024-02-07 NOTE — PATIENT INSTRUCTIONS
Cardiology Providers you saw during your visit:  Dr. Cruz     Medication changes:  1- No changes right now. Will call your wife to get your current med list and blood pressures.         Follow up:  1- Critical Care Clinic 2/19/24 as scheduled  2- CORE Clinic with Sriisha Arias 3/6/24 as scheduled   3- Dr. Cruz in 2 months - will call your wife to schedule        Please call if you have:  1. Weight gain of more than 2 pounds in a day or 5 pounds in a week  2. Increased shortness of breath, swelling or bloating  3. Dizziness, lightheadedness   4. Any questions or concerns.        Follow the American Heart Association Diet and Lifestyle recommendations:  Limit saturated fat, trans fat, sodium, red meat, sweets and sugar-sweetened beverages. If you choose to eat red meat, compare labels and select the leanest cuts available.  Aim for at least 150 minutes of moderate physical activity or 75 minutes of vigorous physical activity - or an equal combination of both - each week.     During business hours: [phone], press option # 1 to schedule an appointment or send a message to your care team     After hours, weekends or holidays: On Call Cardiologist- [phone]   option #4 and ask to speak to the on-call Cardiologist. Inform them you are a CORE/heart failure patient at the [place] [facility].     Chelsey Castillo RN BSN CHFN  Cardiology Nurse Care Coordinator (Heart Failure / C.O.R.E.)

## 2024-02-07 NOTE — TELEPHONE ENCOUNTER
Received fax request for the following.     Potassium Chloride TITI ER 20 MEQ tablet      Preferred Pharmacy:   [facility] [id number] [place] [address]  Phone: [phone] Fax: [phone]        Could we send this information to you in [employer] or would you prefer to receive a phone call?:   Patient would prefer a phone call   Okay to leave a detailed message?: Yes at Home number on file [phone] (home)

## 2024-02-07 NOTE — PROGRESS NOTES
[facility]  Advanced Heart Failure Clinic    Patient Name: Duane C Johnson   : 1950   Date of Visit: 2024    Primary Care Physician: Jose Coles MD     Referring Physician: NADEGE Grier, CNP  Reason for Visit: ischemic cardiomyopathy    Dear Sirisha LIGHT, and Dr. Coles,     I had the pleasure of seeing Duane C Johnson today in the [facility] Advanced Heart Failure Clinic. As you know Duane C Johnson is a pleasant 74 year old year old male, who presents today for further evaluation of ischemic cardiomyopathy.    Mr. Aguila is a 73 year old male with medical history pertinent for anterior STEMI s/p PCI to the pLAD on 10/26/23 with a VT/VF arrest the next day (10/27) with patent stents and unchanged anatomy on repeat angiogram. Placed on amiodarone and discharged 23, ICD was not indicated as this was within 48h of his MI. His EF prior do discharge was 20-30% with a large area of akinesis in the LAD placed on apixaban due to this. He has since been seen in critical care clinic and CORE clinic and presents to establish HF care with me.    Mr. Aguila was readmitted -23 with ADHF and treated with IV lasix. He had some lightheadedness and his metoprolol dose was adjusted. Brillinta changed to the plavix due to SOB. Seen in the ED on 23 for hypotension with BPs 80s/60s. Holding lasix, metoprolol, and losartan. Of note, he was asymptomatic with hypotension. He was seen in the ED on  for chest pain with unremarkable work-up,  admitted - for shortness of breath with bilateral pleural effusions s/p thoracentesis and ADHF exacerbation. He returned to the ED on  with SOB and was IV diuresed with bumex and his outpatient regimen was switched to PO bumex. He was instructed to hold losartan and metoprolol for hypotension.      He was last seen in CORE clinic in January when he was doing better. His losartan was changed to lisinopril 2.5 mg  once daily. Since December, no ER visits or hospitalization for HF.     Today he reports feeling well. He goes to cardiac rehab 3 times per week - exercising for about an hour there, on treadmill, stair climber, arm ergometer, weights. He also goes for walks - walking from the post [place] to his house is uphill and was getting short of breath. He has stopped doing that, and walks through the woods to his basement. He uses a stair lift at home, and does not take stairs, as it makes him out of breath. He holds medications for low BP but is unable to tell me more - his wife manages them for him. He tells me he ends up holding medications many days.    He denies chest pain, PND/orthopnea, palpitations, lightheadedness, syncope, leg swelling.      Wife closely monitors sodium and fluid intake. They have been adhering to a 1.5 L FR and 1500 mg sodium restriction.      Home BPs, /60s, HR 50-65  Weight 138-140 lb      ROS:  A complete 10-point ROS was negative except as above.    PAST MEDICAL HISTORY:  Past Medical History:   Diagnosis Date    Benign essential hypertension     CAD (coronary artery disease)     Chronic systolic heart failure (H)     Hypertension     ST elevation myocardial infarction (STEMI), unspecified artery (H)     Ventricular fibrillation (H)        PAST SURGICAL HISTORY:  Past Surgical History:   Procedure Laterality Date    COLONOSCOPY N/A 3/23/2023    Procedure: COLONOSCOPY, FLEXIBLE, WITH LESION REMOVAL USING SNARE;  Surgeon: Jose Faustin MD;  [facility]    CV CENTRAL VENOUS CATHETER PLACEMENT N/A 10/26/2023    Procedure: Central Venous Catheter Placement;  Surgeon: Rob Lyles MD;  Location: [facility] CARDIAC CATH LAB    CV CORONARY ANGIOGRAM N/A 10/27/2023    Procedure: Coronary Angiogram;  Surgeon: Rob Lyles MD;  Location: [facility] CARDIAC CATH LAB    CV CORONARY ANGIOGRAM N/A 10/26/2023    Procedure: Coronary Angiogram;  Surgeon: Rob Lyles,  MD;  Location: [facility] CARDIAC CATH LAB    CV LEFT HEART CATH N/A 10/26/2023    Procedure: Left Heart Catheterization;  Surgeon: Rob Lyles MD;  Location: [facility] CARDIAC CATH LAB    CV PCI N/A 10/27/2023    Procedure: Percutaneous Coronary Intervention;  Surgeon: Rob Lyles MD;  Location: [facility] CARDIAC CATH LAB    CV PCI STENT DRUG ELUTING N/A 10/26/2023    Procedure: Percutaneous Coronary Intervention Stent;  Surgeon: Rob Lyles MD;  Location: [facility] CARDIAC CATH LAB       FAMILY HISTORY:  Family History   Problem Relation Age of Onset    Other Cancer Mother     Obesity Mother     GI problems Father     Uterine Cancer Sister        SOCIAL HISTORY:  Social History     Socioeconomic History    Marital status:      Spouse name: None    Number of children: None    Years of education: None    Highest education level: None   Tobacco Use    Smoking status: Former     Packs/day: 0.25     Years: 30.00     Additional pack years: 0.00     Total pack years: 7.50     Types: Cigarettes     Quit date: 10/26/2023     Years since quittin.2     Passive exposure: Current    Smokeless tobacco: Never    Tobacco comments:     Quit 10/26/2023   Vaping Use    Vaping Use: Never used   Substance and Sexual Activity    Alcohol use: Yes     Comment: 1-2 beers per day    Drug use: No    Sexual activity: Yes     Partners: Female     Birth control/protection: None   Other Topics Concern    Parent/sibling w/ CABG, MI or angioplasty before 65F 55M? No     Social Determinants of Health     Financial Resource Strain: Low Risk  (2024)    Financial Resource Strain     Within the past 12 months, have you or your family members you live with been unable to get utilities (heat, electricity) when it was really needed?: No   Food Insecurity: Low Risk  (2024)    Food Insecurity     Within the past 12 months, did you worry that your food would run out before you got money to buy more?: No      Within the past 12 months, did the food you bought just not last and you didn t have money to get more?: No   Transportation Needs: Low Risk  (1/2/2024)    Transportation Needs     Within the past 12 months, has lack of transportation kept you from medical appointments, getting your medicines, non-medical meetings or appointments, work, or from getting things that you need?: No   Physical Activity: Inactive (11/16/2023)    Exercise Vital Sign     Days of Exercise per Week: 0 days     Minutes of Exercise per Session: 0 min   Social Connections: Unknown (11/16/2023)    Social Connection and Isolation Panel [NHANES]     Marital Status:    Interpersonal Safety: Low Risk  (10/9/2023)    Interpersonal Safety     Do you feel physically and emotionally safe where you currently live?: Yes     Within the past 12 months, have you been hit, slapped, kicked or otherwise physically hurt by someone?: No     Within the past 12 months, have you been humiliated or emotionally abused in other ways by your partner or ex-partner?: No   Housing Stability: Low Risk  (1/2/2024)    Housing Stability     Do you have housing? : Yes     Are you worried about losing your housing?: No       MEDICATIONS:  Current Outpatient Medications   Medication Sig    amiodarone (PACERONE) 200 MG tablet Take 1 tablet (200 mg) by mouth daily    apixaban ANTICOAGULANT (ELIQUIS) 5 MG tablet Take 1 tablet (5 mg) by mouth 2 times daily    atorvastatin (LIPITOR) 80 MG tablet Take 1 tablet (80 mg) by mouth daily    bumetanide (BUMEX) 1 MG tablet Take 1 tablet (1 mg) by mouth daily    cetirizine (ZYRTEC) 10 MG tablet Take 10 mg by mouth daily    clopidogrel (PLAVIX) 75 MG tablet Take 1 tablet (75 mg) by mouth daily    escitalopram (LEXAPRO) 10 MG tablet Take 10 mg by mouth daily    fish oil-omega-3 fatty acids 1000 MG capsule Take 1 g by mouth 2 times daily Also contains another supplement    lisinopril (ZESTRIL) 2.5 MG tablet Take 0.5 tablets (1.25 mg) by  mouth daily HOLD for systolic blood pressure < 100    magnesium oxide (MAG-OX) 400 MG tablet Take 1 tablet (400 mg) by mouth daily    melatonin 5 MG tablet Take 1-2 tablets (5-10 mg) by mouth at bedtime    multivitamin w/minerals (THERA-VIT-M) tablet Take 1 tablet by mouth daily    potassium chloride ER (KLOR-CON M) 20 MEQ CR tablet Take 1 tablet (20 mEq) by mouth daily    vitamin C (ASCORBIC ACID) 1000 MG TABS Take 1,000 mg by mouth daily    acetaminophen (TYLENOL) 325 MG tablet Take 650 mg by mouth every 6 hours as needed (Patient not taking: Reported on 2/2/2024)    hydrOXYzine HCl (ATARAX) 25 MG tablet Take 1 tablet (25 mg) by mouth every 8 hours as needed for anxiety (Patient not taking: Reported on 2/2/2024)     No current facility-administered medications for this visit.        ALLERGIES:     Allergies   Allergen Reactions    Brilinta [Ticagrelor] Other (See Comments) and Difficulty breathing     Per pt and spouse, hyperventilation.    Clonazepam Other (See Comments)     Per spouse, acted like a zombie and he was shaky, could barely talk.       PHYSICAL EXAM:  BP 92/60 (BP Location: Right arm, Patient Position: Sitting, Cuff Size: Adult Regular)   Pulse 61   Wt 62.9 kg (138 lb 9.6 oz)   SpO2 96%   BMI 22.37 kg/m    Gen: alert, interactive, NAD  HEENT: atraumatic, EOMI, MMM  Neck: supple, no JVD  CV: RRR, no m/r/g  Chest: CTAB, no wheezes or crackles  Abd: soft, NT, ND, +BS  Ext: no LE edema, 2+ peripheral pulses  Skin: warm and dry, no rashes on exposed surfaces  Neuro: alert and oriented, no focal deficits    LABS:    CMP  Last Comprehensive Metabolic Panel:  Sodium   Date Value Ref Range Status   02/07/2024 134 (L) 135 - 145 mmol/L Final     Comment:     Reference intervals for this test were updated on 09/26/2023 to more accurately reflect our healthy population. There may be differences in the flagging of prior results with similar values performed with this method. Interpretation of those prior  results can be made in the context of the updated reference intervals.    05/26/2021 141 133 - 144 mmol/L Final     Sodium Whole Blood   Date Value Ref Range Status   10/27/2023 138 135 - 145 mmol/L Final     Potassium   Date Value Ref Range Status   02/07/2024 4.5 3.4 - 5.3 mmol/L Final   05/26/2021 4.5 3.4 - 5.3 mmol/L Final     Potassium Whole Blood   Date Value Ref Range Status   10/27/2023 4.1 3.4 - 5.3 mmol/L Final     Chloride   Date Value Ref Range Status   02/07/2024 96 (L) 98 - 107 mmol/L Final   05/26/2021 105 94 - 109 mmol/L Final     Chloride Whole Blood   Date Value Ref Range Status   10/27/2023 103 94 - 109 mmol/L Final     Carbon Dioxide   Date Value Ref Range Status   05/26/2021 29 20 - 32 mmol/L Final     Carbon Dioxide (CO2)   Date Value Ref Range Status   02/07/2024 29 22 - 29 mmol/L Final     Carbon Dioxide Whole Blood   Date Value Ref Range Status   10/27/2023 27 20 - 32 mmol/L Final     Anion Gap   Date Value Ref Range Status   02/07/2024 9 7 - 15 mmol/L Final   05/26/2021 7 3 - 14 mmol/L Final     Glucose   Date Value Ref Range Status   02/07/2024 104 (H) 70 - 99 mg/dL Final   05/26/2021 125 (H) 70 - 99 mg/dL Final     GLUCOSE BY METER POCT   Date Value Ref Range Status   11/01/2023 121 (H) 70 - 99 mg/dL Final     Urea Nitrogen   Date Value Ref Range Status   02/07/2024 20.0 8.0 - 23.0 mg/dL Final   05/26/2021 41 (H) 7 - 30 mg/dL Final     Creatinine   Date Value Ref Range Status   02/07/2024 1.01 0.67 - 1.17 mg/dL Final   05/26/2021 0.80 0.66 - 1.25 mg/dL Final     GFR Estimate   Date Value Ref Range Status   02/07/2024 78 >60 mL/min/1.73m2 Final   05/26/2021 90 >60 mL/min/[1.73_m2] Final     Comment:     Non  GFR Calc  Starting 12/18/2018, serum creatinine based estimated GFR (eGFR) will be   calculated using the Chronic Kidney Disease Epidemiology Collaboration   (CKD-EPI) equation.       GFR, ESTIMATED POCT   Date Value Ref Range Status   10/26/2023 >60 >60 mL/min/1.73m2  "Final     Calcium   Date Value Ref Range Status   02/07/2024 9.3 8.8 - 10.2 mg/dL Final   05/26/2021 8.3 (L) 8.5 - 10.1 mg/dL Final     Bilirubin Total   Date Value Ref Range Status   02/07/2024 0.6 <=1.2 mg/dL Final   03/20/2018 0.5 0.2 - 1.3 mg/dL Final     Alkaline Phosphatase   Date Value Ref Range Status   02/07/2024 95 40 - 150 U/L Final     Comment:     Reference intervals for this test were updated on 11/14/2023 to more accurately reflect our healthy population. There may be differences in the flagging of prior results with similar values performed with this method. Interpretation of those prior results can be made in the context of the updated reference intervals.   03/20/2018 82 40 - 150 U/L Final     ALT   Date Value Ref Range Status   02/07/2024 29 0 - 70 U/L Final     Comment:     Reference intervals for this test were updated on 6/12/2023 to more accurately reflect our healthy population. There may be differences in the flagging of prior results with similar values performed with this method. Interpretation of those prior results can be made in the context of the updated reference intervals.     03/20/2018 22 0 - 70 U/L Final     AST   Date Value Ref Range Status   02/07/2024 35 0 - 45 U/L Final     Comment:     Reference intervals for this test were updated on 6/12/2023 to more accurately reflect our healthy population. There may be differences in the flagging of prior results with similar values performed with this method. Interpretation of those prior results can be made in the context of the updated reference intervals.   03/20/2018 20 0 - 45 U/L Final       NT-proBNP       TROP  No results found for: \"TROPI\", \"TROPONIN\", \"TROPR\", \"TROPN\"    CBC  CBC RESULTS:   Recent Labs   Lab Test 02/07/24  1122   WBC 5.3   RBC 4.05*   HGB 12.4*   HCT 37.1*   MCV 92   MCH 30.6   MCHC 33.4   RDW 14.4          LIPIDS  Recent Labs   Lab Test 10/26/23  0336 10/26/23  0059   CHOL 96 117   HDL 30* 32*   LDL 60 " 67   TRIG 29 89       TSH  TSH   Date Value Ref Range Status   12/30/2023 5.24 (H) 0.30 - 4.20 uIU/mL Final       HBA1C  Lab Results   Component Value Date    A1C 4.8 03/13/2023         CARDIAC DATA:    EKG:  Today, 2/7/2024: Heart rate 56 bpm, sinus bradycardia, anteroseptal infarct age-indeterminate, prolonged QT    Echo:  10/30/2023  Interpretation Summary  Ischemic CM. Large LAD MI. Left ventricular function is decreased. The  ejection fraction is 20-30% (severely reduced).  Global right ventricular function is normal.  No significant valvular abnormalities present.  IVC diameter <2.1 cm collapsing >50% with sniff suggests a normal RA pressure  of 3 mmHg.  No pericardial effusion is present.  No significant changes noted.    Stress Test:  None recent    Cardiac MRI:  12/6/2023 CMR  Clinical history: 73 year old with anterior STEMI, cardiac arrest in Oct 2023  Comparison CMR: none  1. The left ventricle is severely enlarged. There is wall thinning and akinesis of the mid-distal anterior,  mid anteroseptum, distal septum and the apex  The global systolic function is severely reduced. The LVEF is  28%.  2. The right ventricle is normal in cavity size. The global systolic function is normal. The RVEF is 52%.   3. The right atrium is normal and the left atrium is severely enlarged.  4. There is mild mitral regurgitation.   5. There is transmural late gadolinium enhancement in mid-distal anterior, mid anteroseptum, distal  sepum  and the apex consistent with a large infarction in the LAD territory.   6. There is no pericardial effusion.  7. There is no intracardiac thrombus.  8. There are bilateral pleural effusions.  CONCLUSIONS:   Ischemic cardiomyopathy with a large anteroapical infarction.   Severely reduced left ventricular function and normal right ventricular function, LVEF 28% and RVEF 52%.    Cardiac Catheterization:  10/26/23    1st Mrg lesion is 20% stenosed.    Prox LAD to Mid LAD lesion is 100%  stenosed.    1st Diag-1 lesion is 80% stenosed.    1st Diag-2 lesion is 50% stenosed.    Dist LAD lesion is 100% stenosed.    RPDA lesion is 70% stenosed.    Dist RCA lesion is 40% stenosed.     Anterior STEMI  Two vessel obstructive CAD (100% pLAD occlusion, 70% rPDA stenosis, 80% diagonal 1 stenosis)  PCI of pLAD to mLAD with BRENDA x 1 (Synergy 2.50x 28 mm) post dilated to 2.75 vessel  CVC placement in RIJ  LVEDP of 26 mmHg  Hemostasis of RRA with TR band     10/26/23:   Unchanged angiogram with patient stent and good distal flow in all arteries  Most likely evolving injury related VT   Amiodarone loaded 75 mg and continue on the floor loading and consult EP for possible vest and or EP study    ASSESSMENT/PLAN:  In summary, Duane C Johnson is here today with the following -      Chronic combined systolic diastolic heart failure, ACC/AHA stage C, NYHA class III, EF 28%  2.  Ischemic cardiomyopathy  3.  Coronary artery disease, history of anterior STEMI in October 2023, s/p PCI of LAD with drug-eluting stents  4. History of VT within 48 hours of his MI, did not receive an ICD  5.  Ongoing issues with hypotension and hospitalizations for heart failure, most recently in December 2023    Plans -  Duane has ischemic cardiomyopathy after an anterior STEMI.  His LVEF has been quite low at 28%.  He also has limited blood pressure at home although is asymptomatic from this.    Goal Directed Medical Therapies for Heart Failure:     These are indicated irrespective of the etiology of heart failure.  We discussed the primary medications, their side effects, the care plan including frequent follow-ups  for optimization of therapies with monitoring of blood pressures, weights and lab results       Beta blocker: he is not taking one, stopped during recent ER visit. Will start metoprolol XL 12.5 mg once daily at bedtime, hold for SBP < 90.  Will also monitor his heart rate on this  ACE/ARB/ARNI: on lisinopril 2.5 mg once daily with  instructions to hold for SBP <90  MRA: yet to start it with significant hypotension  SGLT2 inhibitor: yet to start it with significant hypotension    Will plan to repeat echocardiogram now since it has been several months since his family and there has been limited room and optimizing goal-directed medical therapies due to hypotension.  If his EF remains poor, can consider further risk stratification with right heart cath and cardiopulmonary stress test.  I did not discuss advanced heart failure therapies evaluation with him today but if needed in the future we can discuss durable LVAD evaluation.  He would not be a heart transplant candidate currently due to his age.    Diuretics: Continue Bumex  Cardiac Rehab: Ongoing  ICD/ CRT-D: Will repeat echo now to determine candidacy for ICD  Labs: At follow-up  Follow up: 2 to 3 weeks with core clinic and after that with me  CV Genetic testing: Deferred in the setting of ischemic cardiomyopathy  Sleep medicine: evaluation with sleep medicine scheduled for 3/12/24     Advised on daily home BP and weight monitoring  Advised on observing caution with salt and fluid intake    # CAD s/p PCI to LAD  # STEMI  # VT/VF arrest  - on plavix and apixaban (akinesis of the mid-distal anterior,mid anteroseptum, distal septum and the apex)      I spent 61 minutes in care of the patient today including obtaining recent medical history, personally reviewing recent cardiac testing and/or lab results, today's examination, discussion of testing results and care recommendations with patient.       Thank you for the opportunity to participate in this patient's care. Please feel free to reach out with any questions or concerns.      Ham Cruz MD, [facility]  Associate Professor of Medicine  Advanced Heart Failure, LVAD & Cardiac Transplant  Director, Cardio-Obstetrics  Cardio-Oncology  [facility]

## 2024-02-07 NOTE — PROGRESS NOTES
Clinic Care Coordination Contact  Follow Up Progress Note     1/12/2024 - 1/13/2024 (1 hours)  [facility] Emergency Dept     Blas Betts MD  Last attending  Treatment team Heart failure with reduced ejection fraction, NYHA class II (H) +2 more  Clinical impression Hypotension  Chief complain        Assessment: CC RN spoke to patients wife and she states the patient is not available   Wife reports the patient is doing well with Cardiac Rehabilitation and looks forward to it.  CC Informed wife to let the patient know RN CC called and will make another follow up call in 2 weeks     Care Gaps:    Health Maintenance Due   Topic Date Due    HF ACTION PLAN  Never done    ZOSTER IMMUNIZATION (1 of 2) Never done    RSV VACCINE (Pregnancy & 60+) (1 - 1-dose 60+ series) Never done    DTAP/TDAP/TD IMMUNIZATION (2 - Td or Tdap) 01/23/2018       Not discussed today     Care Plans  Care Plan: Heart Failure       Problem: Heart Failure Management Needs Improvement       Goal: Demonstrate improved heart failure management       Start Date: 11/16/2023 Expected End Date: 2/29/2024    This Visit's Progress: 80% Recent Progress: 70%    Priority: High    Note:     Barriers: Deconditioned   Strengths: supportive wife   Patient expressed understanding of goal: Yes   Action steps to achieve this goal:  1. I will check my weight daily and call if weight gain is 2-3# in a day or 5# in a week,increased shortness of breath episodes or leg swelling   2. I will take my medications as directed and keep future provider appointments   3. I will keep future Cardiac Rehab appointments   4. I will continue home care services                                Intervention/Education provided during outreach: Not discussed      Outreach Frequency: 2 weeks, more frequently as needed        Plan:     Care Coordinator will follow up in 1-2 weeks   [facility]   Ruthy Paiz RN, Care Coordinator   [facility]  [facility]'s   E-mail [email or web address]   [phone]

## 2024-02-07 NOTE — LETTER
2024      RE: Duane C Johnson  [id number] [address] [address]       Dear Colleague,    Thank you for the opportunity to participate in the care of your patient, Duane C Johnson, at the [facility] HEART [facility]IN[facility] [place] at [facility]. Please see a copy of my visit note below.    [facility]  Advanced Heart Failure Clinic    Patient Name: Duane C Johnson   : 1950   Date of Visit: 2024    Primary Care Physician: Jose Coles MD     Referring Physician: NADEGE Grier, CNP  Reason for Visit: ischemic cardiomyopathy    Dear Sirisha LIGHT, and Dr. Coles,     I had the pleasure of seeing Duane C Johnson today in the [facility] Advanced Heart Failure Clinic. As you know Duane C Johnson is a pleasant 74 year old year old male, who presents today for further evaluation of ischemic cardiomyopathy.    Mr. Aguila is a 73 year old male with medical history pertinent for anterior STEMI s/p PCI to the pLAD on 10/26/23 with a VT/VF arrest the next day (10/27) with patent stents and unchanged anatomy on repeat angiogram. Placed on amiodarone and discharged 23, ICD was not indicated as this was within 48h of his MI. His EF prior do discharge was 20-30% with a large area of akinesis in the LAD placed on apixaban due to this. He has since been seen in critical care clinic and CORE clinic and presents to establish HF care with me.    Mr. Aguila was readmitted -23 with ADHF and treated with IV lasix. He had some lightheadedness and his metoprolol dose was adjusted. Brillinta changed to the plavix due to SOB. Seen in the ED on 23 for hypotension with BPs 80s/60s. Holding lasix, metoprolol, and losartan. Of note, he was asymptomatic with hypotension. He was seen in the ED on  for chest pain with unremarkable work-up,  admitted - for shortness of breath with bilateral pleural effusions s/p  thoracentesis and ADHF exacerbation. He returned to the ED on 12/30 with SOB and was IV diuresed with bumex and his outpatient regimen was switched to PO bumex. He was instructed to hold losartan and metoprolol for hypotension.      He was last seen in CORE clinic in January when he was doing better. His losartan was changed to lisinopril 2.5 mg once daily. Since December, no ER visits or hospitalization for HF.     Today he reports feeling well. He goes to cardiac rehab 3 times per week - exercising for about an hour there, on treadmill, stair climber, arm ergometer, weights. He also goes for walks - walking from the post [place] to his house is uphill and was getting short of breath. He has stopped doing that, and walks through the woods to his basement. He uses a stair lift at home, and does not take stairs, as it makes him out of breath. He holds medications for low BP but is unable to tell me more - his wife manages them for him. He tells me he ends up holding medications many days.    He denies chest pain, PND/orthopnea, palpitations, lightheadedness, syncope, leg swelling.      Wife closely monitors sodium and fluid intake. They have been adhering to a 1.5 L FR and 1500 mg sodium restriction.      Home BPs, /60s, HR 50-65  Weight 138-140 lb      ROS:  A complete 10-point ROS was negative except as above.    PAST MEDICAL HISTORY:  Past Medical History:   Diagnosis Date     Benign essential hypertension      CAD (coronary artery disease)      Chronic systolic heart failure (H)      Hypertension      ST elevation myocardial infarction (STEMI), unspecified artery (H)      Ventricular fibrillation (H)        PAST SURGICAL HISTORY:  Past Surgical History:   Procedure Laterality Date     COLONOSCOPY N/A 3/23/2023    Procedure: COLONOSCOPY, FLEXIBLE, WITH LESION REMOVAL USING SNARE;  Surgeon: Jose Faustin MD;  Location: [facility]     CV CENTRAL VENOUS CATHETER PLACEMENT N/A 10/26/2023    Procedure: Central  Venous Catheter Placement;  Surgeon: Rob Lyles MD;  Location:  HEART CARDIAC CATH LAB     CV CORONARY ANGIOGRAM N/A 10/27/2023    Procedure: Coronary Angiogram;  Surgeon: Rob Lyles MD;  [facility]     CV CORONARY ANGIOGRAM N/A 10/26/2023    Procedure: Coronary Angiogram;  Surgeon: Rob Lyles MD;  Location: [facility] CARDIAC CATH LAB     CV LEFT HEART CATH N/A 10/26/2023    Procedure: Left Heart Catheterization;  Surgeon: Rob Lyles MD;  Location: [facility] CARDIAC CATH LAB     CV PCI N/A 10/27/2023    Procedure: Percutaneous Coronary Intervention;  Surgeon: Rob Lyles MD;  Location: [facility] CARDIAC CATH LAB     CV PCI STENT DRUG ELUTING N/A 10/26/2023    Procedure: Percutaneous Coronary Intervention Stent;  Surgeon: Rob Lyles MD;  Location: [facility] CARDIAC CATH LAB       FAMILY HISTORY:  Family History   Problem Relation Age of Onset     Other Cancer Mother      Obesity Mother      GI problems Father      Uterine Cancer Sister        SOCIAL HISTORY:  Social History     Socioeconomic History     Marital status:      Spouse name: None     Number of children: None     Years of education: None     Highest education level: None   Tobacco Use     Smoking status: Former     Packs/day: 0.25     Years: 30.00     Additional pack years: 0.00     Total pack years: 7.50     Types: Cigarettes     Quit date: 10/26/2023     Years since quittin.2     Passive exposure: Current     Smokeless tobacco: Never     Tobacco comments:     Quit 10/26/2023   Vaping Use     Vaping Use: Never used   Substance and Sexual Activity     Alcohol use: Yes     Comment: 1-2 beers per day     Drug use: No     Sexual activity: Yes     Partners: Female     Birth control/protection: None   Other Topics Concern     Parent/sibling w/ CABG, MI or angioplasty before 65F 55M? No     Social Determinants of Health     Financial Resource Strain: Low Risk   (1/2/2024)    Financial Resource Strain      Within the past 12 months, have you or your family members you live with been unable to get utilities (heat, electricity) when it was really needed?: No   Food Insecurity: Low Risk  (1/2/2024)    Food Insecurity      Within the past 12 months, did you worry that your food would run out before you got money to buy more?: No      Within the past 12 months, did the food you bought just not last and you didn t have money to get more?: No   Transportation Needs: Low Risk  (1/2/2024)    Transportation Needs      Within the past 12 months, has lack of transportation kept you from medical appointments, getting your medicines, non-medical meetings or appointments, work, or from getting things that you need?: No   Physical Activity: Inactive (11/16/2023)    Exercise Vital Sign      Days of Exercise per Week: 0 days      Minutes of Exercise per Session: 0 min   Social Connections: Unknown (11/16/2023)    Social Connection and Isolation Panel [NHANES]      Marital Status:    Interpersonal Safety: Low Risk  (10/9/2023)    Interpersonal Safety      Do you feel physically and emotionally safe where you currently live?: Yes      Within the past 12 months, have you been hit, slapped, kicked or otherwise physically hurt by someone?: No      Within the past 12 months, have you been humiliated or emotionally abused in other ways by your partner or ex-partner?: No   Housing Stability: Low Risk  (1/2/2024)    Housing Stability      Do you have housing? : Yes      Are you worried about losing your housing?: No       MEDICATIONS:  Current Outpatient Medications   Medication Sig     amiodarone (PACERONE) 200 MG tablet Take 1 tablet (200 mg) by mouth daily     apixaban ANTICOAGULANT (ELIQUIS) 5 MG tablet Take 1 tablet (5 mg) by mouth 2 times daily     atorvastatin (LIPITOR) 80 MG tablet Take 1 tablet (80 mg) by mouth daily     bumetanide (BUMEX) 1 MG tablet Take 1 tablet (1 mg) by mouth  daily     cetirizine (ZYRTEC) 10 MG tablet Take 10 mg by mouth daily     clopidogrel (PLAVIX) 75 MG tablet Take 1 tablet (75 mg) by mouth daily     escitalopram (LEXAPRO) 10 MG tablet Take 10 mg by mouth daily     fish oil-omega-3 fatty acids 1000 MG capsule Take 1 g by mouth 2 times daily Also contains another supplement     lisinopril (ZESTRIL) 2.5 MG tablet Take 0.5 tablets (1.25 mg) by mouth daily HOLD for systolic blood pressure < 100     magnesium oxide (MAG-OX) 400 MG tablet Take 1 tablet (400 mg) by mouth daily     melatonin 5 MG tablet Take 1-2 tablets (5-10 mg) by mouth at bedtime     multivitamin w/minerals (THERA-VIT-M) tablet Take 1 tablet by mouth daily     potassium chloride ER (KLOR-CON M) 20 MEQ CR tablet Take 1 tablet (20 mEq) by mouth daily     vitamin C (ASCORBIC ACID) 1000 MG TABS Take 1,000 mg by mouth daily     acetaminophen (TYLENOL) 325 MG tablet Take 650 mg by mouth every 6 hours as needed (Patient not taking: Reported on 2/2/2024)     hydrOXYzine HCl (ATARAX) 25 MG tablet Take 1 tablet (25 mg) by mouth every 8 hours as needed for anxiety (Patient not taking: Reported on 2/2/2024)     No current facility-administered medications for this visit.        ALLERGIES:     Allergies   Allergen Reactions     Brilinta [Ticagrelor] Other (See Comments) and Difficulty breathing     Per pt and spouse, hyperventilation.     Clonazepam Other (See Comments)     Per spouse, acted like a zombie and he was shaky, could barely talk.       PHYSICAL EXAM:  BP 92/60 (BP Location: Right arm, Patient Position: Sitting, Cuff Size: Adult Regular)   Pulse 61   Wt 62.9 kg (138 lb 9.6 oz)   SpO2 96%   BMI 22.37 kg/m    Gen: alert, interactive, NAD  HEENT: atraumatic, EOMI, MMM  Neck: supple, no JVD  CV: RRR, no m/r/g  Chest: CTAB, no wheezes or crackles  Abd: soft, NT, ND, +BS  Ext: no LE edema, 2+ peripheral pulses  Skin: warm and dry, no rashes on exposed surfaces  Neuro: alert and oriented, no focal  deficits    LABS:    CMP  Last Comprehensive Metabolic Panel:  Sodium   Date Value Ref Range Status   02/07/2024 134 (L) 135 - 145 mmol/L Final     Comment:     Reference intervals for this test were updated on 09/26/2023 to more accurately reflect our healthy population. There may be differences in the flagging of prior results with similar values performed with this method. Interpretation of those prior results can be made in the context of the updated reference intervals.    05/26/2021 141 133 - 144 mmol/L Final     Sodium Whole Blood   Date Value Ref Range Status   10/27/2023 138 135 - 145 mmol/L Final     Potassium   Date Value Ref Range Status   02/07/2024 4.5 3.4 - 5.3 mmol/L Final   05/26/2021 4.5 3.4 - 5.3 mmol/L Final     Potassium Whole Blood   Date Value Ref Range Status   10/27/2023 4.1 3.4 - 5.3 mmol/L Final     Chloride   Date Value Ref Range Status   02/07/2024 96 (L) 98 - 107 mmol/L Final   05/26/2021 105 94 - 109 mmol/L Final     Chloride Whole Blood   Date Value Ref Range Status   10/27/2023 103 94 - 109 mmol/L Final     Carbon Dioxide   Date Value Ref Range Status   05/26/2021 29 20 - 32 mmol/L Final     Carbon Dioxide (CO2)   Date Value Ref Range Status   02/07/2024 29 22 - 29 mmol/L Final     Carbon Dioxide Whole Blood   Date Value Ref Range Status   10/27/2023 27 20 - 32 mmol/L Final     Anion Gap   Date Value Ref Range Status   02/07/2024 9 7 - 15 mmol/L Final   05/26/2021 7 3 - 14 mmol/L Final     Glucose   Date Value Ref Range Status   02/07/2024 104 (H) 70 - 99 mg/dL Final   05/26/2021 125 (H) 70 - 99 mg/dL Final     GLUCOSE BY METER POCT   Date Value Ref Range Status   11/01/2023 121 (H) 70 - 99 mg/dL Final     Urea Nitrogen   Date Value Ref Range Status   02/07/2024 20.0 8.0 - 23.0 mg/dL Final   05/26/2021 41 (H) 7 - 30 mg/dL Final     Creatinine   Date Value Ref Range Status   02/07/2024 1.01 0.67 - 1.17 mg/dL Final   05/26/2021 0.80 0.66 - 1.25 mg/dL Final     GFR Estimate   Date Value  Ref Range Status   02/07/2024 78 >60 mL/min/1.73m2 Final   05/26/2021 90 >60 mL/min/[1.73_m2] Final     Comment:     Non  GFR Calc  Starting 12/18/2018, serum creatinine based estimated GFR (eGFR) will be   calculated using the Chronic Kidney Disease Epidemiology Collaboration   (CKD-EPI) equation.       GFR, ESTIMATED POCT   Date Value Ref Range Status   10/26/2023 >60 >60 mL/min/1.73m2 Final     Calcium   Date Value Ref Range Status   02/07/2024 9.3 8.8 - 10.2 mg/dL Final   05/26/2021 8.3 (L) 8.5 - 10.1 mg/dL Final     Bilirubin Total   Date Value Ref Range Status   02/07/2024 0.6 <=1.2 mg/dL Final   03/20/2018 0.5 0.2 - 1.3 mg/dL Final     Alkaline Phosphatase   Date Value Ref Range Status   02/07/2024 95 40 - 150 U/L Final     Comment:     Reference intervals for this test were updated on 11/14/2023 to more accurately reflect our healthy population. There may be differences in the flagging of prior results with similar values performed with this method. Interpretation of those prior results can be made in the context of the updated reference intervals.   03/20/2018 82 40 - 150 U/L Final     ALT   Date Value Ref Range Status   02/07/2024 29 0 - 70 U/L Final     Comment:     Reference intervals for this test were updated on 6/12/2023 to more accurately reflect our healthy population. There may be differences in the flagging of prior results with similar values performed with this method. Interpretation of those prior results can be made in the context of the updated reference intervals.     03/20/2018 22 0 - 70 U/L Final     AST   Date Value Ref Range Status   02/07/2024 35 0 - 45 U/L Final     Comment:     Reference intervals for this test were updated on 6/12/2023 to more accurately reflect our healthy population. There may be differences in the flagging of prior results with similar values performed with this method. Interpretation of those prior results can be made in the context of the updated  "reference intervals.   03/20/2018 20 0 - 45 U/L Final       NT-proBNP       TROP  No results found for: \"TROPI\", \"TROPONIN\", \"TROPR\", \"TROPN\"    CBC  CBC RESULTS:   Recent Labs   Lab Test 02/07/24  1122   WBC 5.3   RBC 4.05*   HGB 12.4*   HCT 37.1*   MCV 92   MCH 30.6   MCHC 33.4   RDW 14.4          LIPIDS  Recent Labs   Lab Test 10/26/23  0336 10/26/23  0059   CHOL 96 117   HDL 30* 32*   LDL 60 67   TRIG 29 89       TSH  TSH   Date Value Ref Range Status   12/30/2023 5.24 (H) 0.30 - 4.20 uIU/mL Final       HBA1C  Lab Results   Component Value Date    A1C 4.8 03/13/2023         CARDIAC DATA:    EKG:  Today, 2/7/2024: Heart rate 56 bpm, sinus bradycardia, anteroseptal infarct age-indeterminate, prolonged QT    Echo:  10/30/2023  Interpretation Summary  Ischemic CM. Large LAD MI. Left ventricular function is decreased. The  ejection fraction is 20-30% (severely reduced).  Global right ventricular function is normal.  No significant valvular abnormalities present.  IVC diameter <2.1 cm collapsing >50% with sniff suggests a normal RA pressure  of 3 mmHg.  No pericardial effusion is present.  No significant changes noted.    Stress Test:  None recent    Cardiac MRI:  12/6/2023 CMR  Clinical history: 73 year old with anterior STEMI, cardiac arrest in Oct 2023  Comparison CMR: none  1. The left ventricle is severely enlarged. There is wall thinning and akinesis of the mid-distal anterior,  mid anteroseptum, distal septum and the apex  The global systolic function is severely reduced. The LVEF is  28%.  2. The right ventricle is normal in cavity size. The global systolic function is normal. The RVEF is 52%.   3. The right atrium is normal and the left atrium is severely enlarged.  4. There is mild mitral regurgitation.   5. There is transmural late gadolinium enhancement in mid-distal anterior, mid anteroseptum, distal  sepum  and the apex consistent with a large infarction in the LAD territory.   6. There is no " pericardial effusion.  7. There is no intracardiac thrombus.  8. There are bilateral pleural effusions.  CONCLUSIONS:   Ischemic cardiomyopathy with a large anteroapical infarction.   Severely reduced left ventricular function and normal right ventricular function, LVEF 28% and RVEF 52%.    Cardiac Catheterization:  10/26/23     1st Mrg lesion is 20% stenosed.     Prox LAD to Mid LAD lesion is 100% stenosed.     1st Diag-1 lesion is 80% stenosed.     1st Diag-2 lesion is 50% stenosed.     Dist LAD lesion is 100% stenosed.     RPDA lesion is 70% stenosed.     Dist RCA lesion is 40% stenosed.     Anterior STEMI  Two vessel obstructive CAD (100% pLAD occlusion, 70% rPDA stenosis, 80% diagonal 1 stenosis)  PCI of pLAD to mLAD with BRENDA x 1 (Synergy 2.50x 28 mm) post dilated to 2.75 vessel  CVC placement in RIJ  LVEDP of 26 mmHg  Hemostasis of RRA with TR band     10/26/23:   Unchanged angiogram with patient stent and good distal flow in all arteries  Most likely evolving injury related VT   Amiodarone loaded 75 mg and continue on the floor loading and consult EP for possible vest and or EP study    ASSESSMENT/PLAN:  In summary, Duane C Johnson is here today with the following -      Chronic combined systolic diastolic heart failure, ACC/AHA stage C, NYHA class III, EF 28%  2.  Ischemic cardiomyopathy  3.  Coronary artery disease, history of anterior STEMI in October 2023, s/p PCI of LAD with drug-eluting stents  4. History of VT within 48 hours of his MI, did not receive an ICD  5.  Ongoing issues with hypotension and hospitalizations for heart failure, most recently in December 2023    Plans -  Duane has ischemic cardiomyopathy after an anterior STEMI.  His LVEF has been quite low at 28%.  He also has limited blood pressure at home although is asymptomatic from this.    Goal Directed Medical Therapies for Heart Failure:     These are indicated irrespective of the etiology of heart failure.  We discussed the primary  medications, their side effects, the care plan including frequent follow-ups  for optimization of therapies with monitoring of blood pressures, weights and lab results       Beta blocker: he is not taking one, stopped during recent ER visit. Will start metoprolol XL 12.5 mg once daily at bedtime, hold for SBP < 90.  Will also monitor his heart rate on this  ACE/ARB/ARNI: on lisinopril 2.5 mg once daily with instructions to hold for SBP <90  MRA: yet to start it with significant hypotension  SGLT2 inhibitor: yet to start it with significant hypotension    Will plan to repeat echocardiogram now since it has been several months since his family and there has been limited room and optimizing goal-directed medical therapies due to hypotension.  If his EF remains poor, can consider further risk stratification with right heart cath and cardiopulmonary stress test.  I did not discuss advanced heart failure therapies evaluation with him today but if needed in the future we can discuss durable LVAD evaluation.  He would not be a heart transplant candidate currently due to his age.    Diuretics: Continue Bumex  Cardiac Rehab: Ongoing  ICD/ CRT-D: Will repeat echo now to determine candidacy for ICD  Labs: At follow-up  Follow up: 2 to 3 weeks with core clinic and after that with me  CV Genetic testing: Deferred in the setting of ischemic cardiomyopathy  Sleep medicine: evaluation with sleep medicine scheduled for 3/12/24     Advised on daily home BP and weight monitoring  Advised on observing caution with salt and fluid intake    # CAD s/p PCI to LAD  # STEMI  # VT/VF arrest  - on plavix and apixaban (akinesis of the mid-distal anterior,mid anteroseptum, distal septum and the apex)      I spent 61 minutes in care of the patient today including obtaining recent medical history, personally reviewing recent cardiac testing and/or lab results, today's examination, discussion of testing results and care recommendations with patient.        Thank you for the opportunity to participate in this patient's care. Please feel free to reach out with any questions or concerns.      Ham Cruz MD, [facility]  Associate Professor of Medicine  Advanced Heart Failure, LVAD & Cardiac Transplant  Director, Cardio-Obstetrics  Cardio-Oncology  [facility]    Please do not hesitate to contact me if you have any questions/concerns.     Sincerely,     Ham Cruz MD

## 2024-02-07 NOTE — NURSING NOTE
Called Duane's wife to clarify medications he is currently taking, blood pressures at home and how often meds are help due to blood pressure.     Amiodarone   When BP greater than 100. Holding it almost all the time. Since January 29th has only been above 100 twice       2/3 - 92/56 62 and 105/60 61  2/4 - 93/58 59 and 91/55 61  2/5 - 93/55 61 and 87/59 67  2/6 - 91/55 63 and 107/52 55     No complaints of dizziness or lightheadedness    Date: 2/7/2024    Time of Call: 12:30 PM     Diagnosis:  HF     [ VORB ] Ordering provider: Dr Cruz  Order: Start Metoprolol XL 12.5 mg at bedtime. Hold for systolic less than 90.      Order received by: Chelsey Castillo RN      Follow-up/additional notes: if pressures are low but not symptomatic, does not need to go to ER, instructed to call us.   Should also change parameters of lisinopril to hold for systolic less than 90.     Reviewed plan with wife who verbalized understanding.

## 2024-02-07 NOTE — NURSING NOTE
Chief Complaint   Patient presents with    New Patient     New HF; 74 year old with HFrEF here for initial visit with labs prior     Vitals were taken, medications reconciled, and EKG was performed.    Mary James CNA  11:36 AM

## 2024-02-08 ENCOUNTER — TELEPHONE (OUTPATIENT)
Dept: CARDIOLOGY | Facility: CLINIC | Age: 74
End: 2024-02-08
Payer: MEDICARE

## 2024-02-08 LAB
ATRIAL RATE - MUSE: 56 BPM
DIASTOLIC BLOOD PRESSURE - MUSE: NORMAL MMHG
INTERPRETATION ECG - MUSE: NORMAL
P AXIS - MUSE: 21 DEGREES
PR INTERVAL - MUSE: 206 MS
QRS DURATION - MUSE: 86 MS
QT - MUSE: 544 MS
QTC - MUSE: 524 MS
R AXIS - MUSE: -22 DEGREES
SYSTOLIC BLOOD PRESSURE - MUSE: NORMAL MMHG
T AXIS - MUSE: -30 DEGREES
VENTRICULAR RATE- MUSE: 56 BPM

## 2024-02-09 ENCOUNTER — PATIENT OUTREACH (OUTPATIENT)
Dept: CARDIOLOGY | Facility: CLINIC | Age: 74
End: 2024-02-09
Payer: MEDICARE

## 2024-02-09 ENCOUNTER — HOSPITAL ENCOUNTER (OUTPATIENT)
Dept: CARDIAC REHAB | Facility: CLINIC | Age: 74
Discharge: HOME | End: 2024-02-09
Attending: INTERNAL MEDICINE
Payer: MEDICARE

## 2024-02-09 ENCOUNTER — TELEPHONE (OUTPATIENT)
Dept: CARDIOLOGY | Facility: CLINIC | Age: 74
End: 2024-02-09
Payer: MEDICARE

## 2024-02-09 DIAGNOSIS — I50.42 CHRONIC COMBINED SYSTOLIC AND DIASTOLIC HEART FAILURE (H): Primary | ICD-10-CM

## 2024-02-09 PROCEDURE — 93798 PHYS/QHP OP CAR RHAB W/ECG: CPT

## 2024-02-09 NOTE — PROGRESS NOTES
Date: 2/9/2024    Time of Call: 9:21 AM     Diagnosis:  HF     [ VORB ] Ordering provider: Dr Cruz  Order: echo complete     Order received by: Chelsey Castillo RN      Follow-up/additional notes: will get sometime in next few weeks. Message sent to schedulers. Updated duane and wife.

## 2024-02-10 NOTE — TELEPHONE ENCOUNTER
RECORDS STATUS - ALL OTHER DIAGNOSIS      RECORDS RECEIVED FROM: Epic   DATE RECEIVED:    NOTES STATUS DETAILS   OFFICE NOTE from referring provider Epic 02/02/24: Dr. Nanette Martínez   OFFICE NOTE from other specialist Epic 01/25/24: Dr. Blas Causey   OPERATIVE REPORT [facility] 09/12/23: prostate biopsy   MEDICATION LIST [facility]    LABS     PATHOLOGY REPORTS Report in EPIC 09/12/23: [id number]    ANYTHING RELATED TO DIAGNOSIS Epic Most recent 02/07/24   IMAGING (NEED IMAGES & REPORT)     MRI PACS 06/01/23: MR Prostate

## 2024-02-12 ENCOUNTER — HOSPITAL ENCOUNTER (OUTPATIENT)
Dept: CARDIAC REHAB | Facility: CLINIC | Age: 74
Discharge: HOME | End: 2024-02-12
Attending: INTERNAL MEDICINE
Payer: MEDICARE

## 2024-02-12 PROCEDURE — 93798 PHYS/QHP OP CAR RHAB W/ECG: CPT

## 2024-02-14 ENCOUNTER — HOSPITAL ENCOUNTER (OUTPATIENT)
Dept: CARDIAC REHAB | Facility: CLINIC | Age: 74
Discharge: HOME | End: 2024-02-14
Attending: INTERNAL MEDICINE
Payer: MEDICARE

## 2024-02-14 PROCEDURE — 93798 PHYS/QHP OP CAR RHAB W/ECG: CPT

## 2024-02-15 ENCOUNTER — HOSPITAL ENCOUNTER (OUTPATIENT)
Dept: MRI IMAGING | Facility: CLINIC | Age: 74
Discharge: HOME | End: 2024-02-15
Attending: RADIOLOGY
Payer: MEDICARE

## 2024-02-15 DIAGNOSIS — C61 PROSTATE CANCER (H): ICD-10-CM

## 2024-02-15 PROCEDURE — 74183 MRI ABD W/O CNTR FLWD CNTR: CPT | Mod: MG

## 2024-02-15 PROCEDURE — 72197 MRI PELVIS W/O & W/DYE: CPT | Mod: MG

## 2024-02-15 PROCEDURE — 255N000002 HC RX 255 OP 636: Performed by: RADIOLOGY

## 2024-02-15 PROCEDURE — A9585 GADOBUTROL INJECTION: HCPCS | Performed by: RADIOLOGY

## 2024-02-15 RX ORDER — GADOBUTROL 604.72 MG/ML
6 INJECTION INTRAVENOUS ONCE
Status: COMPLETED | OUTPATIENT
Start: 2024-02-15 | End: 2024-02-15

## 2024-02-15 RX ADMIN — GADOBUTROL 6 ML: 604.72 INJECTION INTRAVENOUS at 20:47

## 2024-02-16 ENCOUNTER — HOSPITAL ENCOUNTER (OUTPATIENT)
Dept: CARDIAC REHAB | Facility: CLINIC | Age: 74
Discharge: HOME | End: 2024-02-16
Attending: FAMILY MEDICINE
Payer: MEDICARE

## 2024-02-16 PROCEDURE — 93798 PHYS/QHP OP CAR RHAB W/ECG: CPT

## 2024-02-18 NOTE — PROGRESS NOTES
[facility]  02/19/2024        History of Presenting Illness:  Duane C Johnson is a  72 yo with a hx of anterior STEMI s/p PCI to the pLAD on 10/26 with a VT/VF arrest the next day (10/27) with patent stents and unchanged anatomy on repeat angiogram. Placed on amiodarone and discharged 11/03/23, ICD was not indicated as this was within 48h of his MI. His EF prior do discharge was 20-30% with a large area of akinesis in the LAD placed on apixaban due to this. He presents to clinic today for follow up.    Since last visit (11/20/23), the patient has followed with HF (Dr. Cruz) and [facility] clinic. CMR 12/6/23 with dilated LV and LVEF of 28% as well as akinesis of mid-distal anterior, mid anteroseptum, distal septum. Normal RV function. and the apex. Admitted to [facility] on 12/25/23 with ADHF and was treated with IV diuresis. He has been participating in cardiac rehab.     Today, patient presents alone. He is doing well from a CV standpoint. No chest pain ever. He has some fatigue and SOB after walking various distances, but no symptoms ever doing his stairs at home. He denies any orthopnea or leg swelling. He notes that he will often have lower blood pressures, but he almost never has symptoms of lightheadedness, dizziness, pre syncope. He is taking medications as prescribed. He has not seen Neuropsych or [facility] due to patient preference. Sleep has been adequate, but notes that his memory (short term) has been somewhat limited.     Home medications:   - Eliquis 5 mg BID  - Clopidogrel 75mg/d  - Metoprolol succinate 12.5 mg daily  - Lisinopirl 1.25 mg daily  - Amiodarone 200 mg daily  - Atorvastatin 80 mg daily  - Bumex 1 mg daily  - Melatonin 5 mg nightly    Past Medical History:  Prostate ca- was going to have prostatectomy the day after the STEMI-   Hernia    Family History:  No family hx of CAD or SCD    Social History:  Ex smoker     ROS:  A complete 10-point ROS was negative except as  above.    Physical Exam:  BP 90/59 (BP Location: Left arm, Patient Position: Chair, Cuff Size: Adult Small)   Pulse 50   Wt 62 kg (136 lb 11.2 oz)   SpO2 94%   BMI 22.06 kg/m      Gen: alert, interactive, NAD  HEENT: atraumatic, EOMI, MMM  Neck: supple, no JVP elevation appreciated.  CV: RRR, no murmurs, rubs.  Chest: CTAB, no wheezes or crackles  Abd: soft, NT, ND, +BS  Ext: no LE edema, 2+ peripheral pulses  Skin: warm and dry, no rashes on exposed surfaces  Neuro: alert and oriented, no focal deficits    Labs:   Labs and cardiac data reviewed personally in clinic.    CMP:  Sodium   Date Value Ref Range Status   02/07/2024 134 (L) 135 - 145 mmol/L Final     Comment:     Reference intervals for this test were updated on 09/26/2023 to more accurately reflect our healthy population. There may be differences in the flagging of prior results with similar values performed with this method. Interpretation of those prior results can be made in the context of the updated reference intervals.    05/26/2021 141 133 - 144 mmol/L Final     Sodium Whole Blood   Date Value Ref Range Status   10/27/2023 138 135 - 145 mmol/L Final     Potassium   Date Value Ref Range Status   02/07/2024 4.5 3.4 - 5.3 mmol/L Final   05/26/2021 4.5 3.4 - 5.3 mmol/L Final     Potassium Whole Blood   Date Value Ref Range Status   10/27/2023 4.1 3.4 - 5.3 mmol/L Final     Chloride   Date Value Ref Range Status   02/07/2024 96 (L) 98 - 107 mmol/L Final   05/26/2021 105 94 - 109 mmol/L Final     Chloride Whole Blood   Date Value Ref Range Status   10/27/2023 103 94 - 109 mmol/L Final     Carbon Dioxide   Date Value Ref Range Status   05/26/2021 29 20 - 32 mmol/L Final     Carbon Dioxide (CO2)   Date Value Ref Range Status   02/07/2024 29 22 - 29 mmol/L Final     Carbon Dioxide Whole Blood   Date Value Ref Range Status   10/27/2023 27 20 - 32 mmol/L Final     Anion Gap   Date Value Ref Range Status   02/07/2024 9 7 - 15 mmol/L Final   05/26/2021 7 3 -  14 mmol/L Final     Glucose   Date Value Ref Range Status   02/07/2024 104 (H) 70 - 99 mg/dL Final   05/26/2021 125 (H) 70 - 99 mg/dL Final     GLUCOSE BY METER POCT   Date Value Ref Range Status   11/01/2023 121 (H) 70 - 99 mg/dL Final     Urea Nitrogen   Date Value Ref Range Status   02/07/2024 20.0 8.0 - 23.0 mg/dL Final   05/26/2021 41 (H) 7 - 30 mg/dL Final     Creatinine   Date Value Ref Range Status   02/07/2024 1.01 0.67 - 1.17 mg/dL Final   05/26/2021 0.80 0.66 - 1.25 mg/dL Final     GFR Estimate   Date Value Ref Range Status   02/07/2024 78 >60 mL/min/1.73m2 Final   05/26/2021 90 >60 mL/min/[1.73_m2] Final     Comment:     Non  GFR Calc  Starting 12/18/2018, serum creatinine based estimated GFR (eGFR) will be   calculated using the Chronic Kidney Disease Epidemiology Collaboration   (CKD-EPI) equation.       GFR, ESTIMATED POCT   Date Value Ref Range Status   10/26/2023 >60 >60 mL/min/1.73m2 Final     Calcium   Date Value Ref Range Status   02/07/2024 9.3 8.8 - 10.2 mg/dL Final   05/26/2021 8.3 (L) 8.5 - 10.1 mg/dL Final     Bilirubin Total   Date Value Ref Range Status   02/07/2024 0.6 <=1.2 mg/dL Final   03/20/2018 0.5 0.2 - 1.3 mg/dL Final     Alkaline Phosphatase   Date Value Ref Range Status   02/07/2024 95 40 - 150 U/L Final     Comment:     Reference intervals for this test were updated on 11/14/2023 to more accurately reflect our healthy population. There may be differences in the flagging of prior results with similar values performed with this method. Interpretation of those prior results can be made in the context of the updated reference intervals.   03/20/2018 82 40 - 150 U/L Final     ALT   Date Value Ref Range Status   02/07/2024 29 0 - 70 U/L Final     Comment:     Reference intervals for this test were updated on 6/12/2023 to more accurately reflect our healthy population. There may be differences in the flagging of prior results with similar values performed with this  method. Interpretation of those prior results can be made in the context of the updated reference intervals.     03/20/2018 22 0 - 70 U/L Final     AST   Date Value Ref Range Status   02/07/2024 35 0 - 45 U/L Final     Comment:     Reference intervals for this test were updated on 6/12/2023 to more accurately reflect our healthy population. There may be differences in the flagging of prior results with similar values performed with this method. Interpretation of those prior results can be made in the context of the updated reference intervals.   03/20/2018 20 0 - 45 U/L Final     CBC  CBC RESULTS:   Recent Labs   Lab Test 11/14/23  0454   WBC 7.0   RBC 3.14*   HGB 10.2*   HCT 30.6*   MCV 98   MCH 32.5   MCHC 33.3   RDW 13.4        LIPIDS  Recent Labs   Lab Test 10/26/23  0336 10/26/23  0059   CHOL 96 117   HDL 30* 32*   LDL 60 67   TRIG 29 89       TSH  TSH   Date Value Ref Range Status   12/30/2023 5.24 (H) 0.30 - 4.20 uIU/mL Final       HBA1C  Lab Results   Component Value Date    A1C 4.8 03/13/2023       Cardiac Data:   CMR 12/6/23:  Clinical history: 73 year old with anterior STEMI, cardiac arrest in Oct 2023     Comparison CMR: none     1. The left ventricle is severely enlarged. There is wall thinning and akinesis of the mid-distal anterior,  mid anteroseptum, distal septum and the apex  The global systolic function is severely reduced. The LVEF is  28%.  2. The right ventricle is normal in cavity size. The global systolic function is normal. The RVEF is 52%.   3. The right atrium is normal and the left atrium is severely enlarged.  4. There is mild mitral regurgitation.   5. There is transmural late gadolinium enhancement in mid-distal anterior, mid anteroseptum, distal  sepum  and the apex consistent with a large infarction in the LAD territory.   6. There is no pericardial effusion.  7. There is no intracardiac thrombus.  8. There are bilateral pleural effusions.     CONCLUSIONS:   Ischemic  cardiomyopathy with a large anteroapical infarction.   Severely reduced left ventricular function and normal right ventricular function, LVEF 28% and RVEF 52%.    Echo 2/19/24:  Interpretation Summary     1. The left ventricle is moderately dilated. Left ventricular hypertrophy is  noted by two-dimensional echocardiography. Proximal septal thickening is  noted. Left ventricular systolic function is moderate to severely reduced. The  visual ejection fraction is 25-30%. Biplane LVEF is 25%. Diastolic Doppler  findings (E/E' ratio and/or other parameters) suggest left ventricular filling  pressures are increased. There is severe hypokinesis to akinesis of the mid  anterior/anterolateral/anteroseptal/inferoseptal walls and all apical segments  of the left ventricle. There is no thrombus seen in the left ventricle.  2. The right ventricle is normal size. The right ventricular systolic function  is normal.  3. The left atrium is moderately dilated. Right atrial size is normal. There  is no color Doppler evidence of an atrial shunt.  4. There is moderate to mod-severe (2-3+) mitral regurgitation.  5. There is mild to moderate (1-2+) tricuspid regurgitation.Right ventricular  systolic pressure is elevated, consistent with moderate pulmonary  hypertension.  6. No pericardial effusion.  7. In direct comparison to the previous study dated 10/30/2023, there has been  an interval increase in the degree of mitral regurgitation. The other findings  are similar.    Echo 10/29/23  Ischemic CM. Large LAD MI. Left ventricular function is decreased. The  ejection fraction is 20-30% (severely reduced).  Global right ventricular function is normal.  No significant valvular abnormalities present.  IVC diameter <2.1 cm collapsing >50% with sniff suggests a normal RA pressure  of 3 mmHg.  No pericardial effusion is present.  No significant changes noted.    Cath 10/26/23 10/27/23    1st Mrg lesion is 20% stenosed.    Prox LAD to Mid LAD  lesion is 100% stenosed.    1st Diag-1 lesion is 80% stenosed.    1st Diag-2 lesion is 50% stenosed.    Dist LAD lesion is 100% stenosed.    RPDA lesion is 70% stenosed.    Dist RCA lesion is 40% stenosed.     Anterior STEMI  Two vessel obstructive CAD (100% pLAD occlusion, 70% rPDA stenosis, 80% diagonal 1 stenosis)  PCI of pLAD to mLAD with BRENDA x 1 (Synergy 2.50x 28 mm) post dilated to 2.75 vessel    Assessment and plan    72 yo  with a hx of anterior STEMI s.p PCI to the pLAD on 10/26 with a VT/VF arrest the next day (10/27) with patent stents and unchanged anatomy on repeat angiogram. Placed on amiodarone and discharged 11/03/23. Has been following with HF/CORE clinic. He has required hospital evaluations/admissions for hypotension and ADHF.     Refractory VF VT  cardiac arrest date 10/27/23    Etiology: Ischemic s/p PCI to LAD (arrest was within 48h post revasc)  Fully revasc:  no- residual 70% rPDA stenosis, 80% diagonal 1 stenosis  LVEF:  TTE 10/29/23 LVEF: 20-30%,  RV function: normal. CMR with LVEF 28%, RVEF 52%  ICD:  Indicated given LVEF <35% while on maximally tolerated GDMT    Neuropsych referral, patient referred today  Behavioral health/psychology referral- offered and info given, patient currently not interested  Send [place] sudden cardiac arrest  group details ([email or web address]; [phone]).  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   Reviewed med list; no changes at this time    #Ischemic cardiomyopathy, LVEF 28% on CMR  - Stage C  - NYHA Class II  - ACEi/ARB: Lisinopril 1.25 mg daily  - BB: Metoprolol succinate 12.5 mg daily  - Aldosterone antagonist: Limited due to hypotension  - SGLT2i: Limited due to hypotension  - SCD prophylaxis: Referred to EP for primary prevent ICD  - Fluid status: Euvolemic  - Following with HF/CORE  clinic. Repeat TTE was ordered. Consideration of further mgmt per HF including cardiopulmoanry stress testing, RHC, and or ICD placement for secondary prevention. QRS is <120 ms.     #History of possible AF during prior hospitalization  - no clear record. Prior EKGs and ziopatches have failed to demonstrated AFib  - is currently on apixaban given low EF which we will continue for now     #CAD  - STEMI- s/p LAD PCI  - denies chest pain  - on plavix and apixaban     Follow up in 6 months.     Seen and discussed with Dr. Sweeney.    Eric Dixon, PGY-5  Cardiovascular Disease Fellow

## 2024-02-19 ENCOUNTER — OFFICE VISIT (OUTPATIENT)
Dept: CARDIOLOGY | Facility: CLINIC | Age: 74
End: 2024-02-19
Attending: INTERNAL MEDICINE
Payer: MEDICARE

## 2024-02-19 ENCOUNTER — HOSPITAL ENCOUNTER (OUTPATIENT)
Dept: CARDIOLOGY | Facility: CLINIC | Age: 74
Discharge: HOME | End: 2024-02-19
Attending: INTERNAL MEDICINE | Admitting: INTERNAL MEDICINE
Payer: MEDICARE

## 2024-02-19 VITALS
SYSTOLIC BLOOD PRESSURE: 90 MMHG | DIASTOLIC BLOOD PRESSURE: 59 MMHG | WEIGHT: 136.7 LBS | OXYGEN SATURATION: 94 % | BODY MASS INDEX: 22.06 KG/M2 | HEART RATE: 50 BPM

## 2024-02-19 DIAGNOSIS — I46.9 CARDIAC ARREST (H): ICD-10-CM

## 2024-02-19 DIAGNOSIS — I50.20 HEART FAILURE WITH REDUCED EJECTION FRACTION, NYHA CLASS I (H): ICD-10-CM

## 2024-02-19 DIAGNOSIS — I50.42 CHRONIC COMBINED SYSTOLIC AND DIASTOLIC HEART FAILURE (H): ICD-10-CM

## 2024-02-19 LAB
BI-PLANE LVEF ECHO: NORMAL
LVEF ECHO: NORMAL

## 2024-02-19 PROCEDURE — 99215 OFFICE O/P EST HI 40 MIN: CPT | Mod: GC | Performed by: STUDENT IN AN ORGANIZED HEALTH CARE EDUCATION/TRAINING PROGRAM

## 2024-02-19 PROCEDURE — 255N000002 HC RX 255 OP 636: Performed by: INTERNAL MEDICINE

## 2024-02-19 PROCEDURE — C8929 TTE W OR WO FOL WCON,DOPPLER: HCPCS

## 2024-02-19 PROCEDURE — 93306 TTE W/DOPPLER COMPLETE: CPT | Mod: 26 | Performed by: INTERNAL MEDICINE

## 2024-02-19 PROCEDURE — G0463 HOSPITAL OUTPT CLINIC VISIT: HCPCS | Performed by: STUDENT IN AN ORGANIZED HEALTH CARE EDUCATION/TRAINING PROGRAM

## 2024-02-19 PROCEDURE — 999N000208 ECHOCARDIOGRAM COMPLETE

## 2024-02-19 RX ADMIN — HUMAN ALBUMIN MICROSPHERES AND PERFLUTREN 2 ML: 10; .22 INJECTION, SOLUTION INTRAVENOUS at 11:18

## 2024-02-19 ASSESSMENT — PAIN SCALES - GENERAL: PAINLEVEL: NO PAIN (0)

## 2024-02-19 NOTE — LETTER
2/19/2024      RE: Duane C Johnson  [address] [address] [address]       Dear Colleague,    Thank you for the opportunity to participate in the care of your patient, Duane C Johnson, at the [facility]. Please see a copy of my visit note below.    Critical Care Cardiology Survivor Clinic  02/19/2024        History of Presenting Illness:  Duane C Johnson is a  74 yo with a hx of anterior STEMI s/p PCI to the pLAD on 10/26 with a VT/VF arrest the next day (10/27) with patent stents and unchanged anatomy on repeat angiogram. Placed on amiodarone and discharged 11/03/23, ICD was not indicated as this was within 48h of his MI. His EF prior do discharge was 20-30% with a large area of akinesis in the LAD placed on apixaban due to this. He presents to clinic today for follow up.    Since last visit (11/20/23), the patient has followed with HF (Dr. Cruz) and CORE clinic. CMR 12/6/23 with dilated LV and LVEF of 28% as well as akinesis of mid-distal anterior, mid anteroseptum, distal septum. Normal RV function. and the apex. Admitted to [facility] on 12/25/23 with ADHF and was treated with IV diuresis. He has been participating in cardiac rehab.     Today, patient presents alone. He is doing well from a CV standpoint. No chest pain ever. He has some fatigue and SOB after walking various distances, but no symptoms ever doing his stairs at home. He denies any orthopnea or leg swelling. He notes that he will often have lower blood pressures, but he almost never has symptoms of lightheadedness, dizziness, pre syncope. He is taking medications as prescribed. He has not seen Neuropsych or Health Psych due to patient preference. Sleep has been adequate, but notes that his memory (short term) has been somewhat limited.     Home medications:   - Eliquis 5 mg BID  - Clopidogrel 75mg/d  - Metoprolol succinate 12.5 mg daily  - Lisinopirl 1.25 mg  daily  - Amiodarone 200 mg daily  - Atorvastatin 80 mg daily  - Bumex 1 mg daily  - Melatonin 5 mg nightly    Past Medical History:  Prostate ca- was going to have prostatectomy the day after the STEMI-   Hernia    Family History:  No family hx of CAD or SCD    Social History:  Ex smoker     ROS:  A complete 10-point ROS was negative except as above.    Physical Exam:  BP 90/59 (BP Location: Left arm, Patient Position: Chair, Cuff Size: Adult Small)   Pulse 50   Wt 62 kg (136 lb 11.2 oz)   SpO2 94%   BMI 22.06 kg/m      Gen: alert, interactive, NAD  HEENT: atraumatic, EOMI, MMM  Neck: supple, no JVP elevation appreciated.  CV: RRR, no murmurs, rubs.  Chest: CTAB, no wheezes or crackles  Abd: soft, NT, ND, +BS  Ext: no LE edema, 2+ peripheral pulses  Skin: warm and dry, no rashes on exposed surfaces  Neuro: alert and oriented, no focal deficits    Labs:   Labs and cardiac data reviewed personally in clinic.    CMP:  Sodium   Date Value Ref Range Status   02/07/2024 134 (L) 135 - 145 mmol/L Final     Comment:     Reference intervals for this test were updated on 09/26/2023 to more accurately reflect our healthy population. There may be differences in the flagging of prior results with similar values performed with this method. Interpretation of those prior results can be made in the context of the updated reference intervals.    05/26/2021 141 133 - 144 mmol/L Final     Sodium Whole Blood   Date Value Ref Range Status   10/27/2023 138 135 - 145 mmol/L Final     Potassium   Date Value Ref Range Status   02/07/2024 4.5 3.4 - 5.3 mmol/L Final   05/26/2021 4.5 3.4 - 5.3 mmol/L Final     Potassium Whole Blood   Date Value Ref Range Status   10/27/2023 4.1 3.4 - 5.3 mmol/L Final     Chloride   Date Value Ref Range Status   02/07/2024 96 (L) 98 - 107 mmol/L Final   05/26/2021 105 94 - 109 mmol/L Final     Chloride Whole Blood   Date Value Ref Range Status   10/27/2023 103 94 - 109 mmol/L Final     Carbon Dioxide   Date  Value Ref Range Status   05/26/2021 29 20 - 32 mmol/L Final     Carbon Dioxide (CO2)   Date Value Ref Range Status   02/07/2024 29 22 - 29 mmol/L Final     Carbon Dioxide Whole Blood   Date Value Ref Range Status   10/27/2023 27 20 - 32 mmol/L Final     Anion Gap   Date Value Ref Range Status   02/07/2024 9 7 - 15 mmol/L Final   05/26/2021 7 3 - 14 mmol/L Final     Glucose   Date Value Ref Range Status   02/07/2024 104 (H) 70 - 99 mg/dL Final   05/26/2021 125 (H) 70 - 99 mg/dL Final     GLUCOSE BY METER POCT   Date Value Ref Range Status   11/01/2023 121 (H) 70 - 99 mg/dL Final     Urea Nitrogen   Date Value Ref Range Status   02/07/2024 20.0 8.0 - 23.0 mg/dL Final   05/26/2021 41 (H) 7 - 30 mg/dL Final     Creatinine   Date Value Ref Range Status   02/07/2024 1.01 0.67 - 1.17 mg/dL Final   05/26/2021 0.80 0.66 - 1.25 mg/dL Final     GFR Estimate   Date Value Ref Range Status   02/07/2024 78 >60 mL/min/1.73m2 Final   05/26/2021 90 >60 mL/min/[1.73_m2] Final     Comment:     Non  GFR Calc  Starting 12/18/2018, serum creatinine based estimated GFR (eGFR) will be   calculated using the Chronic Kidney Disease Epidemiology Collaboration   (CKD-EPI) equation.       GFR, ESTIMATED POCT   Date Value Ref Range Status   10/26/2023 >60 >60 mL/min/1.73m2 Final     Calcium   Date Value Ref Range Status   02/07/2024 9.3 8.8 - 10.2 mg/dL Final   05/26/2021 8.3 (L) 8.5 - 10.1 mg/dL Final     Bilirubin Total   Date Value Ref Range Status   02/07/2024 0.6 <=1.2 mg/dL Final   03/20/2018 0.5 0.2 - 1.3 mg/dL Final     Alkaline Phosphatase   Date Value Ref Range Status   02/07/2024 95 40 - 150 U/L Final     Comment:     Reference intervals for this test were updated on 11/14/2023 to more accurately reflect our healthy population. There may be differences in the flagging of prior results with similar values performed with this method. Interpretation of those prior results can be made in the context of the updated reference  intervals.   03/20/2018 82 40 - 150 U/L Final     ALT   Date Value Ref Range Status   02/07/2024 29 0 - 70 U/L Final     Comment:     Reference intervals for this test were updated on 6/12/2023 to more accurately reflect our healthy population. There may be differences in the flagging of prior results with similar values performed with this method. Interpretation of those prior results can be made in the context of the updated reference intervals.     03/20/2018 22 0 - 70 U/L Final     AST   Date Value Ref Range Status   02/07/2024 35 0 - 45 U/L Final     Comment:     Reference intervals for this test were updated on 6/12/2023 to more accurately reflect our healthy population. There may be differences in the flagging of prior results with similar values performed with this method. Interpretation of those prior results can be made in the context of the updated reference intervals.   03/20/2018 20 0 - 45 U/L Final     CBC  CBC RESULTS:   Recent Labs   Lab Test 11/14/23  0454   WBC 7.0   RBC 3.14*   HGB 10.2*   HCT 30.6*   MCV 98   MCH 32.5   MCHC 33.3   RDW 13.4        LIPIDS  Recent Labs   Lab Test 10/26/23  0336 10/26/23  0059   CHOL 96 117   HDL 30* 32*   LDL 60 67   TRIG 29 89       TSH  TSH   Date Value Ref Range Status   12/30/2023 5.24 (H) 0.30 - 4.20 uIU/mL Final       HBA1C  Lab Results   Component Value Date    A1C 4.8 03/13/2023       Cardiac Data:   CMR 12/6/23:  Clinical history: 73 year old with anterior STEMI, cardiac arrest in Oct 2023     Comparison CMR: none     1. The left ventricle is severely enlarged. There is wall thinning and akinesis of the mid-distal anterior,  mid anteroseptum, distal septum and the apex  The global systolic function is severely reduced. The LVEF is  28%.  2. The right ventricle is normal in cavity size. The global systolic function is normal. The RVEF is 52%.   3. The right atrium is normal and the left atrium is severely enlarged.  4. There is mild mitral  regurgitation.   5. There is transmural late gadolinium enhancement in mid-distal anterior, mid anteroseptum, distal  sepum  and the apex consistent with a large infarction in the LAD territory.   6. There is no pericardial effusion.  7. There is no intracardiac thrombus.  8. There are bilateral pleural effusions.     CONCLUSIONS:   Ischemic cardiomyopathy with a large anteroapical infarction.   Severely reduced left ventricular function and normal right ventricular function, LVEF 28% and RVEF 52%.    Echo 2/19/24:  Interpretation Summary     1. The left ventricle is moderately dilated. Left ventricular hypertrophy is  noted by two-dimensional echocardiography. Proximal septal thickening is  noted. Left ventricular systolic function is moderate to severely reduced. The  visual ejection fraction is 25-30%. Biplane LVEF is 25%. Diastolic Doppler  findings (E/E' ratio and/or other parameters) suggest left ventricular filling  pressures are increased. There is severe hypokinesis to akinesis of the mid  anterior/anterolateral/anteroseptal/inferoseptal walls and all apical segments  of the left ventricle. There is no thrombus seen in the left ventricle.  2. The right ventricle is normal size. The right ventricular systolic function  is normal.  3. The left atrium is moderately dilated. Right atrial size is normal. There  is no color Doppler evidence of an atrial shunt.  4. There is moderate to mod-severe (2-3+) mitral regurgitation.  5. There is mild to moderate (1-2+) tricuspid regurgitation.Right ventricular  systolic pressure is elevated, consistent with moderate pulmonary  hypertension.  6. No pericardial effusion.  7. In direct comparison to the previous study dated 10/30/2023, there has been  an interval increase in the degree of mitral regurgitation. The other findings  are similar.    Echo 10/29/23  Ischemic CM. Large LAD MI. Left ventricular function is decreased. The  ejection fraction is 20-30% (severely  reduced).  Global right ventricular function is normal.  No significant valvular abnormalities present.  IVC diameter <2.1 cm collapsing >50% with sniff suggests a normal RA pressure  of 3 mmHg.  No pericardial effusion is present.  No significant changes noted.    Cath 10/26/23 10/27/23     1st Mrg lesion is 20% stenosed.     Prox LAD to Mid LAD lesion is 100% stenosed.     1st Diag-1 lesion is 80% stenosed.     1st Diag-2 lesion is 50% stenosed.     Dist LAD lesion is 100% stenosed.     RPDA lesion is 70% stenosed.     Dist RCA lesion is 40% stenosed.     Anterior STEMI  Two vessel obstructive CAD (100% pLAD occlusion, 70% rPDA stenosis, 80% diagonal 1 stenosis)  PCI of pLAD to mLAD with BRENDA x 1 (Synergy 2.50x 28 mm) post dilated to 2.75 vessel    Assessment and plan    72 yo  with a hx of anterior STEMI s.p PCI to the pLAD on 10/26 with a VT/VF arrest the next day (10/27) with patent stents and unchanged anatomy on repeat angiogram. Placed on amiodarone and discharged 11/03/23. Has been following with HF/CORE clinic. He has required hospital evaluations/admissions for hypotension and ADHF.     Refractory VF VT  cardiac arrest date 10/27/23    Etiology: Ischemic s/p PCI to LAD (arrest was within 48h post revasc)  Fully revasc:  no- residual 70% rPDA stenosis, 80% diagonal 1 stenosis  LVEF:  TTE 10/29/23 LVEF: 20-30%,  RV function: normal. CMR with LVEF 28%, RVEF 52%  ICD:  Indicated given LVEF <35% while on maximally tolerated GDMT    Neuropsych referral, patient referred today  Behavioral health/psychology referral- offered and info given, patient currently not interested  Send [place] sudden cardiac arrest  group details ([email or web address]; [phone]).  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   Reviewed med list; no changes at this  time    #Ischemic cardiomyopathy, LVEF 28% on CMR  - Stage C  - NYHA Class II  - ACEi/ARB: Lisinopril 1.25 mg daily  - BB: Metoprolol succinate 12.5 mg daily  - Aldosterone antagonist: Limited due to hypotension  - SGLT2i: Limited due to hypotension  - SCD prophylaxis: Referred to EP for primary prevent ICD  - Fluid status: Euvolemic  - Following with HF/CORE clinic. Repeat TTE was ordered. Consideration of further mgmt per HF including cardiopulmoanry stress testing, RHC, and or ICD placement for secondary prevention. QRS is <120 ms.     #History of possible AF during prior hospitalization  - no clear record. Prior EKGs and ziopatches have failed to demonstrated AFib  - is currently on apixaban given low EF which we will continue for now     #CAD  - STEMI- s/p LAD PCI  - denies chest pain  - on plavix and apixaban     Follow up in 6 months.     Seen and discussed with Dr. Sweeney.    Eric Dixon, PGY-5  Cardiovascular Disease Fellow      Attestation signed by Sharon Conner MD at 2/19/2024  4:19 PM:      Physician Attestation  I saw this patient with the resident and agree with the resident/fellow's findings and plan of care as documented in the note.      Regalado findings: Briefly Duane is a 74 yo  with a hx of anterior STEMI s.p PCI to the pLAD on 10/26 with a VT/VF arrest the next day (10/27) with patent stents and unchanged anatomy on repeat angiogram. Placed on amiodarone and discharged 11/03/23  His EF prior to discharge was 20-30% with a large area of akinesis in the LAD and therefore discharged on apixaban. He saw us and CORE clinic and had a hospital admission 11/6-20 with ADHF requiring IV diuretics he went back to the ED 11/22 with hypotension and they held his GDMT he was readmitted in december with ADHF. His losartan was switched to lisinopril which he is only taking at very limited doses due to hypotension. He established care with our HF team (Dr Cheek) on 2/7/24. Plan for reapeat  echo which was performed today showed no change in  EF sent referral to EP for ICD (QRS is <120ms).   He discussed he is having some episodes at night where he forgets where he is at and it takes him a wile to figure it out. We discussed neuropsych referral which he is open to do.   No changes were made to his medications.       Sharon Meza MD  Date of Service (when I saw the patient): 02/19/24      Please do not hesitate to contact me if you have any questions/concerns.     Sincerely,     Sharon Meza MD

## 2024-02-19 NOTE — PATIENT INSTRUCTIONS
Critical Care Cardiology Survivor Clinic                                                                You were seen today in the Critical Care Cardiology Survivor Clinic at the [facility]:       Dr. Ag Mcintyre        Your visit summary and instructions are as follows:    Ep referral to discuss ICD  Referral for neuropsychology to assess whether there are issues with memory or cognition.   Continue to work toward the recommendation of 150 minutes of moderate intensity exercise/week and maintain a healthy lifestyle (avoid illicit drugs, smoking and moderate alcohol consumption)    Return to Critical Care Cardiology Survivor Clinic with device check prior (if applicable) in 6 months      -Support group: This is not required but can be helpful to connect to other survivors.   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     -What is Health Psychology?  Health Psychology is a specialty that helps people cope with the stress and anxiety that often occurs with illness, emotional and psychological issues, and preparation for medical treatment including surgical procedures.    Please contact our psychologists directly with questions or to make an appointment.    Ruby Lindsey, Ph.D.,                 [phone]  Ashley White, Ph.D.                      [phone]  Tamiko Alonso, Ph.D.,                    [phone]          Pérez Faustin, Ph.D., [facility],   [phone]       Thank you for your visit today!     Please MyChart message me or call Chelsie Bocanegra RN or Angi Schaefer RN if you have any questions or concerns.      During Business Hours:  [phone], option # 1 (University) then option # 4 (medical questions) and ask to speak with my nurse.     After hours, weekends or holidays:   [phone], Option #4  Ask to speak  to the On-Call Cardiologist. Inform them you are a heart failure patient at the [facility].

## 2024-02-19 NOTE — NURSING NOTE
Chief Complaint   Patient presents with    Follow Up     02/19/24: reason for visit: follow-up for cardiac arrest       Vitals were taken and medications reconciled.    William Gonzalez, EMT  12:42 PM

## 2024-02-21 ENCOUNTER — HOSPITAL ENCOUNTER (OUTPATIENT)
Dept: CARDIAC REHAB | Facility: CLINIC | Age: 74
Discharge: HOME | End: 2024-02-21
Attending: FAMILY MEDICINE
Payer: MEDICARE

## 2024-02-21 PROCEDURE — 93798 PHYS/QHP OP CAR RHAB W/ECG: CPT

## 2024-02-22 ENCOUNTER — HOSPITAL ENCOUNTER (OUTPATIENT)
Dept: NUCLEAR MEDICINE | Facility: CLINIC | Age: 74
Setting detail: NUCLEAR MEDICINE
Discharge: HOME | End: 2024-02-22
Attending: RADIOLOGY
Payer: MEDICARE

## 2024-02-22 ENCOUNTER — PATIENT OUTREACH (OUTPATIENT)
Dept: CARE COORDINATION | Facility: CLINIC | Age: 74
End: 2024-02-22
Payer: MEDICARE

## 2024-02-22 DIAGNOSIS — C61 PROSTATE CANCER (H): ICD-10-CM

## 2024-02-22 PROCEDURE — 343N000001 HC RX 343: Performed by: RADIOLOGY

## 2024-02-22 PROCEDURE — A9561 TC99M OXIDRONATE: HCPCS | Performed by: RADIOLOGY

## 2024-02-22 PROCEDURE — 78306 BONE IMAGING WHOLE BODY: CPT | Mod: MG

## 2024-02-22 RX ADMIN — Medication 25 MILLICURIE: at 08:05

## 2024-02-22 ASSESSMENT — ACTIVITIES OF DAILY LIVING (ADL): DEPENDENT_IADLS:: INDEPENDENT

## 2024-02-22 NOTE — LETTER
[facility] COORDIN[facility]  [address] [address]     February 22, 2024    Duane C Johnson  [address] [address] [place] [address]    Dear Duane,  Your Care Team congratulates you on your journey to maintain wellness. This document will help guide you on your journey to maintain a healthy lifestyle.  You can use this to help you overcome any barriers you may encounter.  If you should have any questions or concerns, you can contact the members of your Care Team or contact your Primary Care Clinic for assistance.     Health Maintenance  Health Maintenance Reviewed: Not assessed    My Access Plan  Medical Emergency 911   Primary Clinic Line [facility] - [phone]   24 Hour Appointment Line [phone] or  [phone]) (toll-free)   24 Hour Nurse Line [phone] (toll-free)   Preferred Urgent Care [facility], [phone]   Preferred Hospital [place], [place]  [phone]   Preferred Pharmacy [facility] [address] Behavioral Health Crisis Line The National Suicide Prevention Lifeline at 1-362.584.4778 or 911     My Care Team Members  Patient Care Team         Relationship Specialty Notifications Start End    Jose Coles MD PCP - General Family Practice  4/22/19     Phone: [phone] Fax: [phone]         [address] [place] [address]    Davey Randolph MD Assigned PCP   3/25/23     Phone: [phone] Fax: [phone]         [address] [place] [address]    Aranza Jiménez PA-C Physician Assistant Urology  4/7/23     Phone: [phone] Fax: [phone] [address] [facility] [address]    Blas Causey MD Assigned Cancer Care Provider   9/23/23     Phone: [phone] Pager: [phone] Fax: [phone]        [address] [place] [address]    Ruthy Paiz RN Lead Care  Coordinator Primary Care - CC Admissions 11/6/23 2/22/24    Phone: [phone]         Dai Aguila, RN Specialty Care Coordinator Cardiology Admissions 11/28/23     Sirisha Arias APRN CNP Assigned Surgical Provider   12/9/23     Phone: [phone] Fax: [phone]         [address] [facility] [address]    Nanette Martínez MD MD Radiation Oncology  1/25/24     Phone: [phone] Fax: [phone]         [facility] [address] [facility] [address] [facility] [address]    Sharon Conner MD Assigned Heart and Vascular Provider   1/25/24     Phone: [phone] Fax: [phone]         [address] [facility] [address]    Nanette Martínez MD MD Radiation Oncology  2/7/24     Phone: [phone] Fax: [phone]         [address] [place] [address] [address] [facility] [address]    Adama Youngblood MD MD Medical Oncology  2/7/24     Phone: [phone] Fax: [phone]         [address] [facility] [address]    Guero Black MD MD Cardiovascular Disease  2/20/24     Phone: [phone] Fax: [phone]         [address] [facility] [address] [facility] [place] [address]                  Advance Care Plans/Directives Type:      We notice that you do not have an Advance Directive on file. Upon completion of your Health Care Directive, please bring a copy with you to your next office visit.    It has been your Clinic Care Team's pleasure to work with you on your goals.    Regards,  [facility]   Ruthy Paiz RN, Care Coordinator   [facility]'s   E-mail [email or web address]   [phone]   Your Clinic Care Team

## 2024-02-22 NOTE — PROGRESS NOTES
Clinic Care Coordination Contact  Follow Up Progress Note     1/12/2024 - 1/13/2024 (1 hours)  [facility] Emergency Dept     Blas Betts MD  Last attending  Treatment team Heart failure with reduced ejection fraction, NYHA class II (H) +2 more  Clinical impression Hypotension  Chief complain         Assessment: Patient reports he continues Cardiac Rehabilitation Mon-Wednesday and Friday and is getting stronger .  Patient will continue to follow Cardiology CORE clinic and call with concerns     Care Gaps:    Health Maintenance Due   Topic Date Due    HF ACTION PLAN  Never done    ZOSTER IMMUNIZATION (1 of 2) Never done    RSV VACCINE (Pregnancy & 60+) (1 - 1-dose 60+ series) Never done    DTAP/TDAP/TD IMMUNIZATION (2 - Td or Tdap) 01/23/2018    MEDICARE ANNUAL WELLNESS VISIT  03/13/2024    FALL RISK ASSESSMENT  03/13/2024       We discuss at next PCP visit       Intervention/Education provided during outreach: Graduation letter sent via My Chart               Plan:   No unmet needs, no further care coordination is needed at this time     [facility]   Ruthy Paiz RN, Care Coordinator   [facility]'s   E-mail [email or web address]   [phone]

## 2024-02-23 ENCOUNTER — HOSPITAL ENCOUNTER (OUTPATIENT)
Dept: CARDIAC REHAB | Facility: CLINIC | Age: 74
Discharge: HOME | End: 2024-02-23
Attending: FAMILY MEDICINE
Payer: MEDICARE

## 2024-02-23 ENCOUNTER — TELEPHONE (OUTPATIENT)
Dept: RADIATION ONCOLOGY | Facility: CLINIC | Age: 74
End: 2024-02-23

## 2024-02-23 ENCOUNTER — TELEPHONE (OUTPATIENT)
Dept: RADIATION ONCOLOGY | Facility: CLINIC | Age: 74
End: 2024-02-23
Payer: MEDICARE

## 2024-02-23 PROCEDURE — 93798 PHYS/QHP OP CAR RHAB W/ECG: CPT

## 2024-02-23 PROCEDURE — 99207 PR NO CHARGE LOS: CPT | Performed by: RADIOLOGY

## 2024-02-23 NOTE — TELEPHONE ENCOUNTER
74 year old gentleman with significant cardiovascular disease diagnosed with Sarah 4+3, PSA 4.61 high volume unfavorable intermediate risk prostate cancer referred for definitive management with radiation therapy. He was seen by me in consultation on 2/2/24. [facility] nomogram estimates organ confined disease 20%, extracapsular extension 78%, lymph node involvement 24%, seminal vesicle invasion 31%. Today I called the patient and informed him of his bone scan and abdomen/pelvic MRI results.  He will continue to follow up with Dr. Youngblood on 3/5/24, Urology for fiducials and spaceOAR and then us for definitive XRT.  I also informed him that given the timing he will likely be seen by my colleague Dr. Vallejo moving forward. He was in agreement with this plan. There was ample time for questions and all were answered to the patient (and wifes) satisfaction.    Ryan Martínez MD

## 2024-02-26 ENCOUNTER — HOSPITAL ENCOUNTER (OUTPATIENT)
Dept: CARDIAC REHAB | Facility: CLINIC | Age: 74
Discharge: HOME | End: 2024-02-26
Attending: FAMILY MEDICINE
Payer: MEDICARE

## 2024-02-26 PROCEDURE — 93798 PHYS/QHP OP CAR RHAB W/ECG: CPT

## 2024-02-28 ENCOUNTER — HOSPITAL ENCOUNTER (OUTPATIENT)
Dept: CARDIAC REHAB | Facility: CLINIC | Age: 74
Discharge: HOME | End: 2024-02-28
Attending: INTERNAL MEDICINE
Payer: MEDICARE

## 2024-02-28 DIAGNOSIS — I50.20 HEART FAILURE WITH REDUCED EJECTION FRACTION, NYHA CLASS I (H): Primary | ICD-10-CM

## 2024-02-28 PROCEDURE — 93798 PHYS/QHP OP CAR RHAB W/ECG: CPT

## 2024-03-05 ENCOUNTER — ONCOLOGY VISIT (OUTPATIENT)
Dept: ONCOLOGY | Facility: CLINIC | Age: 74
End: 2024-03-05
Attending: RADIOLOGY
Payer: MEDICARE

## 2024-03-05 ENCOUNTER — PRE VISIT (OUTPATIENT)
Dept: ONCOLOGY | Facility: CLINIC | Age: 74
End: 2024-03-05
Payer: MEDICARE

## 2024-03-05 VITALS
HEART RATE: 66 BPM | BODY MASS INDEX: 21.86 KG/M2 | SYSTOLIC BLOOD PRESSURE: 102 MMHG | WEIGHT: 136 LBS | RESPIRATION RATE: 12 BRPM | HEIGHT: 66 IN | OXYGEN SATURATION: 96 % | TEMPERATURE: 97.6 F | DIASTOLIC BLOOD PRESSURE: 59 MMHG

## 2024-03-05 DIAGNOSIS — C61 PROSTATE CANCER (H): Primary | ICD-10-CM

## 2024-03-05 PROCEDURE — 99204 OFFICE O/P NEW MOD 45 MIN: CPT | Performed by: INTERNAL MEDICINE

## 2024-03-05 PROCEDURE — G0463 HOSPITAL OUTPT CLINIC VISIT: HCPCS | Performed by: INTERNAL MEDICINE

## 2024-03-05 ASSESSMENT — PAIN SCALES - GENERAL: PAINLEVEL: NO PAIN (0)

## 2024-03-05 NOTE — LETTER
3/5/2024         RE: Duane C Johnson  [address] [address] [place] [id number]        Dear Colleague,    Thank you for referring your patient, Duane C Johnson, to the [facility]. Please see a copy of my visit note below.    [facility] Hematology and Oncology Consult Note    Patient: Duane C Johnson  MRN: [id number]  Date of Service: 03/05/2024      Reason for Visit    Chief Complaint   Patient presents with     Oncology Clinic Visit     Prostate cancer - New consult         Assessment/Plan    Problem List Items Addressed This Visit          Urinary    Prostate cancer (H)     Prostate cancer  Sarah 4+3 = 7  Grade group 3  Unfavorable intermediate risk  I reviewed his pathology report and MRI/CT scan images and report personally.  I also independently interpreted the results to my ability.     Recently diagnosed localized prostate cancer.  PSA is not significantly elevated.  On the biopsy his [place] score was 4+3=7 which belongs to grade group 3.  No evidence of any distant mets on the available imaging studies so far.  So he falls into unfavorable intermediate risk category.  Not a candidate for surgery due to significant cardiovascular disease history.  Plan is to proceed with EBRT.  I think in this setting there is a role for ADT which has shown to decrease risk of local and distant recurrence when combined with the radiation therapy.  Reviewed the rationale behind this.  Reviewed the potential side effects and complications associated with it.  Total duration of treatment will be up to 6 months.  I will touch base with radiation oncology.  I provided him the information regarding Lupron.  He still needs fiducials and SpaceOAR placement by urology.  Will be starting treatment pretty soon.    He did mention to me that at the time of bone scan technetium 99 contrast did spill into the soft tissue in his left cubital fossa.  He has a small bump there.  No significant pain but it  is a bit tender to touch.  He thinks the swelling has come down but has become more harder now.  No discoloration noted.  He wanted us to be aware of this.    ECOG Performance    1 - Can't do physically strenuous work, but fully ambyulatory and can do light sedentary work    Problem List    Patient Active Problem List   Diagnosis     Hyperlipidemia LDL goal <130     Prostate cancer (H)     ST elevation MI (STEMI) (H)     Tobacco dependence syndrome     Adiposity     Multifocal pneumonia     Elevated brain natriuretic peptide (BNP) level     Primary hypertension     SOB (shortness of breath)     Coronary artery disease due to calcified coronary lesion     Pneumonia of left upper lobe due to infectious organism     Systolic CHF (H)     Hx of cardiac arrest     Anxiety     Congestive heart failure, unspecified HF chronicity, unspecified heart failure type (H)     ST elevation myocardial infarction involving left anterior descending (LAD) coronary artery (H)     Acute on chronic congestive heart failure, unspecified heart failure type (H)     Acute decompensated heart failure (H)     Atherosclerosis of native coronary artery of native heart with angina pectoris (H24)     Left inguinal hernia     ______________________________________________________________________________    Staging History     Cancer Staging   No matching staging information was found for the patient.        History of presenting illness:  Duane C Johnson is a 74-year-old male with history of coronary artery disease, congestive heart failure, hypertension, STEMI among other medical problems with a new diagnosis of locally advanced prostate cancer is seen in the oncology clinic to discuss role of ADT in this setting.    He had elevated PSA at 4.61 last year for which she was seen in the urology clinic.  He had an MRI of the prostate in June 2023 which showed a PI RADS 4 lesion located in the right mid gland peripheral zone at the 7 o'clock position.   There was low likelihood of minimal extraprostatic extension minimal extraprostatic extension.  There were 2 other additional PI RADS 4 lesions.  No evidence of direct invasion of the neurovascular bundle.  No evidence of any pelvic adenopathy.  He had a biopsy in September 2023 which showed prostatic adenocarcinoma [place] 4+3 equal 7.  He was initially scheduled to undergo prostatectomy but this before the surgery he had a STEMI and had complications with CHF.  Has had multiple hospitalizations since then.  He was deemed not a candidate for any surgical resection and was referred to radiation oncology who has already met.  Based on the documentation from rad unk it looks like the plan was to proceed with EBRT and we have been asked to evaluate for the role of ADT in this setting.    He had a repeat MRI and bone scan done recently.  Bone scan negative for any metastatic disease.  No evidence of distant soft tissue metastatic disease either.    Denies any chest pain.  Does have some shortness of with on exertion.    Previous smoker but quit in 2023.  Has a 30-pack-year smoking history.  Family history of uterine cancer in his sister.    Pathology:  Final Diagnosis   A.  Prostate, target 1, biopsy:  - Prostatic adenocarcinoma  - Sarah score 6 (3+3)  - Grade Group 1  - Extent: Involves 2 of 2 cores (7 mm, 70% and 7 mm, 70%)  - Perineural invasion is present     B.  Prostate, target 2, biopsy:   - Prostatic adenocarcinoma  - Sarah score 7 (4+3)  - Grade group 3  - Extent: Involves 1 of 2 cores (3 mm, 20%)     C.  Prostate, target 3, biopsy:  - Benign prostate tissue     D.  Prostate, right base, biopsy:  - Prostatic adenocarcinoma  - Sarah score 6 (3+3)  - Grade Group 1  - Extent: Involves 1 of 2 cores (1 mm, 10%)     E.  Prostate, right mid, biopsy:  - Prostatic adenocarcinoma  - Sarah score 6 (3+3)  - Grade Group 1  - Extent: Involves 2 of 2 cores (7 mm, 70% and 3 mm, 25%)     F.  Prostate, right apex,  biopsy:  - Prostatic adenocarcinoma  - Sarah score 6 (3+3)  - Grade Group 1  - Extent: Involves 2 of 2 cores (5 mm, 40% and 2 mm, 20%)     G.  Prostate, left base, biopsy:  - Prostatic adenocarcinoma  - Sarah score 7 (4+3)  - Grade group 3  - Extent: Involves 2 of 2 cores (2 mm, 10% and 1 mm, 10%)     H.  Prostate, left mid, biopsy:  - Prostatic adenocarcinoma  - [place] score 7 (4+3)  - Grade group 3  - Extent: Involves 2 of 2 cores (2 mm, 20% and 3 mm, 20%)     I.  Prostate, left apex, biopsy:  - Prostatic adenocarcinoma  - [place] score 7 (4+3)  - Grade group 3  - Extent: Involves 1 of 2 cores (3 mm, 10%)         Past History    Past Medical History:   Diagnosis Date     Benign essential hypertension      CAD (coronary artery disease)      Chronic systolic heart failure (H)      Hypertension      ST elevation myocardial infarction (STEMI), unspecified artery (H)      Ventricular fibrillation (H)     Family History   Problem Relation Age of Onset     Other Cancer Mother      Obesity Mother      GI problems Father      Uterine Cancer Sister       Past Surgical History:   Procedure Laterality Date     COLONOSCOPY N/A 3/23/2023    Procedure: COLONOSCOPY, FLEXIBLE, WITH LESION REMOVAL USING SNARE;  Surgeon: Jose Faustin MD;  Location: [facility]     CV CENTRAL VENOUS CATHETER PLACEMENT N/A 10/26/2023    Procedure: Central Venous Catheter Placement;  Surgeon: Rob Lyles MD;  Location: [facility] CARDIAC CATH LAB     CV CORONARY ANGIOGRAM N/A 10/27/2023    Procedure: Coronary Angiogram;  Surgeon: Rob Lyles MD;  Location: [facility] CARDIAC CATH LAB     CV CORONARY ANGIOGRAM N/A 10/26/2023    Procedure: Coronary Angiogram;  Surgeon: Rob Lyles MD;  Location: [facility] CARDIAC CATH LAB     CV LEFT HEART CATH N/A 10/26/2023    Procedure: Left Heart Catheterization;  Surgeon: Rob Lyles MD;  Location: [facility] CARDIAC CATH LAB     CV PCI N/A 10/27/2023    Procedure:  Percutaneous Coronary Intervention;  Surgeon: Rob Lyles MD;  Location: [facility] CARDIAC CATH LAB     CV PCI STENT DRUG ELUTING N/A 10/26/2023    Procedure: Percutaneous Coronary Intervention Stent;  Surgeon: Rob Lyles MD;  Location: [facility] CARDIAC CATH LAB    Social History     Socioeconomic History     Marital status:      Spouse name: Not on file     Number of children: Not on file     Years of education: Not on file     Highest education level: Not on file   Occupational History     Not on file   Tobacco Use     Smoking status: Former     Packs/day: 0.25     Years: 30.00     Additional pack years: 0.00     Total pack years: 7.50     Types: Cigarettes     Quit date: 10/26/2023     Years since quittin.3     Passive exposure: Current     Smokeless tobacco: Never     Tobacco comments:     Quit 10/26/2023   Vaping Use     Vaping Use: Never used   Substance and Sexual Activity     Alcohol use: Yes     Comment: 1-2 beers per day     Drug use: No     Sexual activity: Yes     Partners: Female     Birth control/protection: None   Other Topics Concern     Parent/sibling w/ CABG, MI or angioplasty before 65F 55M? No   Social History Narrative     Not on file     Social Determinants of Health     Financial Resource Strain: Low Risk  (2024)    Financial Resource Strain      Within the past 12 months, have you or your family members you live with been unable to get utilities (heat, electricity) when it was really needed?: No   Food Insecurity: Low Risk  (2024)    Food Insecurity      Within the past 12 months, did you worry that your food would run out before you got money to buy more?: No      Within the past 12 months, did the food you bought just not last and you didn t have money to get more?: No   Transportation Needs: Low Risk  (2024)    Transportation Needs      Within the past 12 months, has lack of transportation kept you from medical appointments, getting your  medicines, non-medical meetings or appointments, work, or from getting things that you need?: No   Physical Activity: Inactive (11/16/2023)    Exercise Vital Sign      Days of Exercise per Week: 0 days      Minutes of Exercise per Session: 0 min   Stress: Not on file   Social Connections: Unknown (11/16/2023)    Social Connection and Isolation Panel [NHANES]      Frequency of Communication with Friends and Family: Not on file      Frequency of Social Gatherings with Friends and Family: Not on file      Attends [employer] Services: Not on file      Active Member of Clubs or Organizations: Not on file      Attends Club or Organization Meetings: Not on file      Marital Status:    Interpersonal Safety: Low Risk  (10/9/2023)    Interpersonal Safety      Do you feel physically and emotionally safe where you currently live?: Yes      Within the past 12 months, have you been hit, slapped, kicked or otherwise physically hurt by someone?: No      Within the past 12 months, have you been humiliated or emotionally abused in other ways by your partner or ex-partner?: No   Housing Stability: Low Risk  (1/2/2024)    Housing Stability      Do you have housing? : Yes      Are you worried about losing your housing?: No        Allergies    Allergies   Allergen Reactions     Brilinta [Ticagrelor] Other (See Comments) and Difficulty breathing     Per pt and spouse, hyperventilation.     Clonazepam Other (See Comments)     Per spouse, acted like a zombie and he was shaky, could barely talk.       Review of Systems    Pertinent items are noted in HPI.      Physical Exam        3/5/2024    12:57 PM   Oncology Vitals   Height 168 cm   Weight 61.689 kg   BSA (m2) 1.7 m2   /59   Pulse 66   Temp 97.6  F (36.4  C)   Temp src Tympanic   SpO2 96 %   Pain Score 0 (None)       General: alert and cooperative  HEENT: Head: Normal, normocephalic, atraumatic.  Eye: Normal external eye, conjunctiva, lids cornea, DOMINICK.  Chest: Clear to  auscultation bilaterally  Cardiac: S1, S2 normal, regular rate and rhythm  Abdomen: abdomen is soft without significant tenderness, masses, organomegaly or guarding  Extremities: atraumatic, no peripheral edema  CNS: Alert and oriented x3, neurologic exam grossly normal.  Lymphatics: No bilateral cervical, axillary, supraclavicular or inguinal adenopathy noted      Lab Results    No results found for this or any previous visit (from the past 168 hour(s)).    Imaging Results    NM Bone Scan Whole Body    Result Date: 2024  EXAM: NM BONE SCAN WHOLE BODY LOCATION: [facility] DATE: 2024 INDICATION: 75yo M w  significant cardiovascular disease diagnosed with [place] 4+3, PSA 4.61 high volume unfavorable intermediate risk prostate cancer COMPARISON: CT chest 2023. TECHNIQUE: 25.0 mCi technetium-99m MDP, IV. Anterior and posterior delayed whole-body images at 3 hours with additional spot images of the ribs. FINDINGS: No specific scintigraphic findings for osteoblastic metastatic disease. Uptake within the bilateral anterior third, fourth, fifth, and sixth ribs corresponds to sites of prior fractures on prior CTs and is compatible with posttraumatic uptake. Degenerative uptake of the right greater than left shoulders as well as the bilateral wrists, knees, and right greater than left feet.     IMPRESSION: No evidence of osteoblastic metastatic disease.    Echocardiogram Complete    Result Date: 2024  [id number]^[email or web address]^VALDO  [facility] [address] [place] [address]  Name: JOHNSON, DUANE C MRN: [id number] : 1950 Study Date: 2024 10:34 AM Age: 74 yrs Gender: Male Patient Location: [facility] Reason For Study: Chronic combined systolic and diastolic heart failure (H) Ordering Physician: VALDO BOONE Referring Physician: Jose Coles Performed By: Annabelle Shin RDCS  BSA: 1.7 m2  Height: 66 in Weight: 138 lb HR: 51 BP: 95/60 mmHg ______________________________________________________________________________ Procedure Complete Echo Adult. Optison (NDC #[id number]) given intravenously. ______________________________________________________________________________ Interpretation Summary  1. The left ventricle is moderately dilated. Left ventricular hypertrophy is noted by two-dimensional echocardiography. Proximal septal thickening is noted. Left ventricular systolic function is moderate to severely reduced. The visual ejection fraction is 25-30%. Biplane LVEF is 25%. Diastolic Doppler findings (E/E' ratio and/or other parameters) suggest left ventricular filling pressures are increased. There is severe hypokinesis to akinesis of the mid anterior/anterolateral/anteroseptal/inferoseptal walls and all apical segments of the left ventricle. There is no thrombus seen in the left ventricle. 2. The right ventricle is normal size. The right ventricular systolic function is normal. 3. The left atrium is moderately dilated. Right atrial size is normal. There is no color Doppler evidence of an atrial shunt. 4. There is moderate to mod-severe (2-3+) mitral regurgitation. 5. There is mild to moderate (1-2+) tricuspid regurgitation.Right ventricular systolic pressure is elevated, consistent with moderate pulmonary hypertension. 6. No pericardial effusion. 7. In direct comparison to the previous study dated 10/30/2023, there has been an interval increase in the degree of mitral regurgitation. The other findings are similar. ______________________________________________________________________________ Left Ventricle The left ventricle is moderately dilated. Left ventricular hypertrophy is noted by two-dimensional echocardiography. Proximal septal thickening is noted. Left ventricular systolic function is moderate to severely reduced. The visual ejection fraction is 25-30%. Biplane LVEF is 25%. Diastolic  Doppler findings (E/E' ratio and/or other parameters) suggest left ventricular filling pressures are increased. There is severe hypokinesis to akinesis of the mid anterior/anterolateral/anteroseptal/inferoseptal walls and all apical segments of the left ventricle. There is no thrombus seen in the left ventricle.  Right Ventricle The right ventricle is normal size. The right ventricular systolic function is normal.  Atria The left atrium is moderately dilated. Right atrial size is normal. There is no color Doppler evidence of an atrial shunt.  Mitral Valve There is moderate to mod-severe (2-3+) mitral regurgitation.  Tricuspid Valve There is mild to moderate (1-2+) tricuspid regurgitation. The right ventricular systolic pressure is approximated at 51.6 mmHg plus the right atrial pressure. IVC diameter <2.1 cm collapsing >50% with sniff suggests a normal RA pressure of 3 mmHg. Right ventricular systolic pressure is elevated, consistent with moderate pulmonary hypertension.  Aortic Valve There is mild trileaflet aortic sclerosis. No aortic regurgitation is present. No aortic stenosis is present.  Pulmonic Valve There is mild (1+) pulmonic valvular regurgitation. There is no pulmonic valvular stenosis.  Vessels The aortic root is normal size. Normal size ascending aorta. The inferior vena cava is normal.  Pericardium There is no pericardial effusion. Moderate left pleural effusion.  Rhythm Sinus rhythm was noted. ______________________________________________________________________________ MMode/2D Measurements & Calculations IVSd: 1.3 cm  LVIDd: 6.8 cm LVIDs: 5.8 cm LVPWd: 0.83 cm FS: 14.4 % LV mass(C)d: 333.5 grams LV mass(C)dI: 195.2 grams/m2 Ao root diam: 3.3 cm asc Aorta Diam: 3.5 cm Ao root diam index Ht(cm/m): 2.0 Ao root diam index BSA (cm/m2): 1.9 Asc Ao diam index BSA (cm/m2): 2.0 Asc Ao diam index Ht(cm/m): 2.1 LA Volume (BP): 78.2 ml  LA Volume Index (BP): 45.7 ml/m2 RV Base: 3.5 cm RWT: 0.24 TAPSE: 1.9 cm   Doppler Measurements & Calculations MV E max ivan: 83.3 cm/sec MV A max ivan: 28.7 cm/sec MV E/A: 2.9 MV dec time: 0.15 sec MR PISA: 1.6 cm2 MR ERO: 0.16 cm2 MR volume: 23.9 ml PA acc time: 0.15 sec TR max ivan: 359.0 cm/sec TR max P.6 mmHg E/E' av.5 Lateral E/e': 7.5 Medial E/e': 33.6 RV S Ivan: 14.1 cm/sec  ______________________________________________________________________________ Report approved by: Eusebio Maria 2024 12:39 PM       MR Pelvis (Intrapelvic Organs) wo&w Contrast    Result Date: 2024  EXAM: MR ABDOMEN W/O and W CONTRAST, MR PELVIS (INTRAPELVIC ORGANS) W/O and W CONTRAST LOCATION: [facility] DATE: 2/15/2024 INDICATION: 73 yo male with significant cardiovascular disease diagnosed with Sarah 4+3, PSA 4.61 high volume unfavorable intermediate risk prostate cancer.  This is a staging MRI. COMPARISON: MRI prostate 2023. TECHNIQUE: Routine MRI abdomen and pelvis protocol including T1 in/out phase, diffusion, multiplane T2, and dynamic T1 without and with IV contrast. CONTRAST: 6mL Gadavist. FINDINGS: LOWER CHEST: Small bibasilar pleural effusions. HEPATOBILIARY: No fat or iron deposition identified. No worrisome hepatic observation. Cholelithiasis. No biliary ductal dilatation. PANCREAS: Normal. SPLEEN: Normal. ADRENAL GLANDS: 1.4 cm left adrenal nodule. This appears to show signal dropout on out-of-phase series 9 image 43 suggestive of an adenoma. Normal right adrenal. KIDNEYS/BLADDER: No hydronephrosis. No significant renal abnormality. A few bladder diverticula. BOWEL: No acute abnormality. Right inguinal hernia contains a herniated small bowel loop. Hernia sac is approximately 3.9 cm series 18 image 100. No associated obstruction or inflammation at this time. Fat-containing left inguinal hernia measuring 4.1 cm. Colonic diverticula. LYMPH NODES: No visualized enlarged lymph nodes. VASCULATURE: Unremarkable. PELVIC ORGANS: This is not a formal  prostate specific MRI. However, again seen is an 11 mm right posterior 7:00 prostate low T2 signal lesion series 40 image 28. This appeared to represent a high-grade lesion on the prior prostate MRI from 06/01/2023. The additional previously described 5:00 and 1:00 lesions on the prior exam are not able to be well-visualized on this exam. No clear mass effect from the prostate. MUSCULOSKELETAL: No visible lesion, but correlate with bone scanning for more sensitive assessment.     IMPRESSION: 1.  No convincing metastatic disease within the limits of the exam. 2.  The previously described prostate lesions on the dedicated prostate MRI from 06/01/2023 are limited in view. The right posterior 7:00 position nodule appears stable in size while the other lesions cannot be visualized. Please note that this is not a dedicated prostate specific MRI examination. 3.  Small bibasilar pleural effusions. 4.  Right inguinal hernia contains a herniated small bowel loop, but there is no bowel obstruction at this time. There is also fat herniating into the left inguinal canal.    MR Abdomen w/o & w Contrast    Result Date: 2/16/2024  EXAM: MR ABDOMEN W/O and W CONTRAST, MR PELVIS (INTRAPELVIC ORGANS) W/O and W CONTRAST LOCATION: [facility] DATE: 2/15/2024 INDICATION: 73 yo male with significant cardiovascular disease diagnosed with [place] 4+3, PSA 4.61 high volume unfavorable intermediate risk prostate cancer.  This is a staging MRI. COMPARISON: MRI prostate 06/01/2023. TECHNIQUE: Routine MRI abdomen and pelvis protocol including T1 in/out phase, diffusion, multiplane T2, and dynamic T1 without and with IV contrast. CONTRAST: 6mL Gadavist. FINDINGS: LOWER CHEST: Small bibasilar pleural effusions. HEPATOBILIARY: No fat or iron deposition identified. No worrisome hepatic observation. Cholelithiasis. No biliary ductal dilatation. PANCREAS: Normal. SPLEEN: Normal. ADRENAL GLANDS: 1.4 cm left adrenal nodule.  This appears to show signal dropout on out-of-phase series 9 image 43 suggestive of an adenoma. Normal right adrenal. KIDNEYS/BLADDER: No hydronephrosis. No significant renal abnormality. A few bladder diverticula. BOWEL: No acute abnormality. Right inguinal hernia contains a herniated small bowel loop. Hernia sac is approximately 3.9 cm series 18 image 100. No associated obstruction or inflammation at this time. Fat-containing left inguinal hernia measuring 4.1 cm. Colonic diverticula. LYMPH NODES: No visualized enlarged lymph nodes. VASCULATURE: Unremarkable. PELVIC ORGANS: This is not a formal prostate specific MRI. However, again seen is an 11 mm right posterior 7:00 prostate low T2 signal lesion series 40 image 28. This appeared to represent a high-grade lesion on the prior prostate MRI from 06/01/2023. The additional previously described 5:00 and 1:00 lesions on the prior exam are not able to be well-visualized on this exam. No clear mass effect from the prostate. MUSCULOSKELETAL: No visible lesion, but correlate with bone scanning for more sensitive assessment.     IMPRESSION: 1.  No convincing metastatic disease within the limits of the exam. 2.  The previously described prostate lesions on the dedicated prostate MRI from 06/01/2023 are limited in view. The right posterior 7:00 position nodule appears stable in size while the other lesions cannot be visualized. Please note that this is not a dedicated prostate specific MRI examination. 3.  Small bibasilar pleural effusions. 4.  Right inguinal hernia contains a herniated small bowel loop, but there is no bowel obstruction at this time. There is also fat herniating into the left inguinal canal.     A total of 45 minutes was spent today on this visit including face to face conversation with the patient, EMR review (labs, imaging studies, pathology reports and outside records), counseling and care co-ordination and documentation.    Signed by: Adama  "MD Ford      Oncology Rooming Note    March 5, 2024 12:57 PM   Duane C Johnson is a 74 year old male who presents for:    Chief Complaint   Patient presents with     Oncology Clinic Visit     Prostate cancer - New consult     Initial Vitals: /59 (BP Location: Right arm, Patient Position: Sitting, Cuff Size: Adult Regular)   Pulse 66   Temp 97.6  F (36.4  C) (Tympanic)   Resp 12   Ht 1.683 m (5' 6.25\")   Wt 61.7 kg (136 lb)   SpO2 96%   BMI 21.79 kg/m   Estimated body mass index is 21.79 kg/m  as calculated from the following:    Height as of this encounter: 1.683 m (5' 6.25\").    Weight as of this encounter: 61.7 kg (136 lb). Body surface area is 1.7 meters squared.  No Pain (0) Comment: Data Unavailable   No LMP for male patient.  Allergies reviewed: Yes  Medications reviewed: No, wife takes care of medication    Medications: Medication refills not needed today.  Pharmacy name entered into [facility]: [facility], [place] - [address] [place] [address]    Frailty Screening:   Is the patient here for a new oncology consult visit in cancer care? 2. No      Clinical concerns:  None      Anna Marie Velez CMA                Again, thank you for allowing me to participate in the care of your patient.        Sincerely,        Adama Youngblood MD  "

## 2024-03-05 NOTE — PROGRESS NOTES
[facility] Cardiology   [facility]      HPI:   Mr. Aguila is a 74 year old male with medical history pertinent for anterior STEMI s/p PCI to the pLAD on 10/26/23 with a VT/VF arrest the next day (10/27) with patent stents and unchanged anatomy on repeat angiogram. Placed on amiodarone and discharged 11/03/23, ICD was not indicated as this was within 48h of his MI. His EF prior do discharge was 20-30% with a large area of akinesis in the LAD placed on apixaban due to this. He presents to CORE for routine follow up.      Mr. Aguila was readmitted 11/6-11/20/23 with ADHF and treated with IV lasix. He had some lightheadedness and his metoprolol dose was adjusted. Brillinta changed to the plavix due to SOB. Seen in the ED on 11/22/23 for hypotension with BPs 80s/60s. Holding lasix, metoprolol, and losartan. Of note, he was asymptomatic with hypotension.     At initial [facility] visit in 11/2023, reported feeling well. Had been frequently holding losartan and metoprolol because of prescribed hold parameters. While BPs were lower with systolic pressures in the 90s-100s, he was asymptomatic. We liberalized hold parameters in order to allow for more consistency with taking GDMT.      Since our initial visit, Mr. Aguila was seen in the ED on 12/20 for chest pain with unremarkable work-up, and then admitted 12/25-12/28 for shortness of breath with bilateral pleural effusions s/p thoracentesis and ADHF exacerbation. He returned to the ED on 12/30 with SOB and was IV diuresed with bumex and his outpatient regimen was switched to PO bumex. He was instructed to hold losartan and metoprolol for hypotension.     Established with Dr. Cruz on 2/7/24. At this visit he was re-started on metoprolol succinate 12.5 mg daily. Only tolerating very small dose of lisinopril 1.25 mg daily. GDMT limited d/t hypotension. Most recently seen in the critical care survivor clinic on 2/19/24. No medication changes. TTE 2/19 showed persistently low EF  25-30%, mod-severe MR, mild-mod TR. Referred to EP. At this visit Duane also reported some forgetfulness. Referred to neuropysch.    Today, Mr. Aguila presents to clinic alone. He reports feeling well. He has complete cardiac rehab. [place] and takes daily walks. Will have occasional lightheadedness, finds it improves with activity. Denies CP, palpitations, lightheadedness, syncope. Denies orthopnea, PND, LE edema. Breathing has been much improve since thora and denies any acute SOB. DIOP is in proportion to exertion.     Wife closely monitors sodium and fluid intake. They have been adhering to a 1.5 L FR and 1500 mg sodium restriction.     Monitors daily BPs, /50s-60s, HR 45-60s  Holding Metoprolol for SBP < 90  Weight 138-140 lb    Cardiac Medications   - Amiodarone 200 mg daily   - Eliquis 5 mg BID  - Atorvastatin 80 mg daily   - Plavix 75 mg daily   - Bumex 1 mg daily   - Lisinopril 1.25 mg daily   - Metoprolol succinate 12.5 mg daily      PAST MEDICAL HISTORY:  Past Medical History:   Diagnosis Date    Benign essential hypertension     CAD (coronary artery disease)     Chronic systolic heart failure (H)     Hypertension     ST elevation myocardial infarction (STEMI), unspecified artery (H)     Ventricular fibrillation (H)        FAMILY HISTORY:  Family History   Problem Relation Age of Onset    Other Cancer Mother     Obesity Mother     GI problems Father     Uterine Cancer Sister        SOCIAL HISTORY:  Social History     Socioeconomic History    Marital status:      Spouse name: None    Number of children: None    Years of education: None    Highest education level: None   Tobacco Use    Smoking status: Former     Packs/day: .25     Types: Cigarettes     Passive exposure: Current    Smokeless tobacco: Never    Tobacco comments:     Quit 10/26/2023   Vaping Use    Vaping Use: Never used   Substance and Sexual Activity    Alcohol use: Yes     Comment: occas    Drug use: No     Sexual activity: Yes     Partners: Female     Birth control/protection: None   Other Topics Concern    Parent/sibling w/ CABG, MI or angioplasty before 65F 55M? No     Social Determinants of Health     Financial Resource Strain: Low Risk  (11/23/2023)    Financial Resource Strain     Within the past 12 months, have you or your family members you live with been unable to get utilities (heat, electricity) when it was really needed?: No   Food Insecurity: Low Risk  (11/23/2023)    Food Insecurity     Within the past 12 months, did you worry that your food would run out before you got money to buy more?: No     Within the past 12 months, did the food you bought just not last and you didn t have money to get more?: No   Transportation Needs: Low Risk  (11/23/2023)    Transportation Needs     Within the past 12 months, has lack of transportation kept you from medical appointments, getting your medicines, non-medical meetings or appointments, work, or from getting things that you need?: No   Physical Activity: Inactive (11/16/2023)    Exercise Vital Sign     Days of Exercise per Week: 0 days     Minutes of Exercise per Session: 0 min   Social Connections: Unknown (11/16/2023)    Social Connection and Isolation Panel [NHANES]     Marital Status:    Interpersonal Safety: Low Risk  (10/9/2023)    Interpersonal Safety     Do you feel physically and emotionally safe where you currently live?: Yes     Within the past 12 months, have you been hit, slapped, kicked or otherwise physically hurt by someone?: No     Within the past 12 months, have you been humiliated or emotionally abused in other ways by your partner or ex-partner?: No   Housing Stability: Low Risk  (11/23/2023)    Housing Stability     Do you have housing? : Yes     Are you worried about losing your housing?: No       CURRENT MEDICATIONS:  acetaminophen (TYLENOL) 325 MG tablet, Take 650 mg by mouth every 6 hours as needed  amiodarone (PACERONE) 200 MG tablet,  Take 1 tablet (200 mg) by mouth daily  apixaban ANTICOAGULANT (ELIQUIS) 5 MG tablet, Take 1 tablet (5 mg) by mouth 2 times daily  atorvastatin (LIPITOR) 80 MG tablet, Take 1 tablet (80 mg) by mouth daily  bumetanide (BUMEX) 1 MG tablet, Take 1 tablet (1 mg) by mouth daily  cetirizine (ZYRTEC) 10 MG tablet, Take 10 mg by mouth daily  clopidogrel (PLAVIX) 75 MG tablet, Take 1 tablet (75 mg) by mouth daily  escitalopram (LEXAPRO) 10 MG tablet, Take 10 mg by mouth daily  fish oil-omega-3 fatty acids 1000 MG capsule, Take 1 g by mouth 2 times daily Also contains another supplement  lisinopril (ZESTRIL) 2.5 MG tablet, Take 0.5 tablets (1.25 mg) by mouth daily HOLD for systolic blood pressure < 90  magnesium oxide (MAG-OX) 400 MG tablet, Take 1 tablet (400 mg) by mouth daily  melatonin 5 MG tablet, Take 1-2 tablets (5-10 mg) by mouth at bedtime  metoprolol succinate ER (TOPROL XL) 25 MG 24 hr tablet, Take 0.5 tablets (12.5 mg) by mouth daily Hold for systolic blood pressure less than 90.  multivitamin w/minerals (THERA-VIT-M) tablet, Take 1 tablet by mouth daily  potassium chloride ER (KLOR-CON M) 20 MEQ CR tablet, Take 1 tablet (20 mEq) by mouth daily  vitamin C (ASCORBIC ACID) 1000 MG TABS, Take 1,000 mg by mouth daily  hydrOXYzine HCl (ATARAX) 25 MG tablet, Take 1 tablet (25 mg) by mouth every 8 hours as needed for anxiety (Patient not taking: Reported on 2/2/2024)    No current facility-administered medications on file prior to visit.      ROS:   Refer to HPI    EXAM:  BP 99/67 (BP Location: Right arm, Patient Position: Chair, Cuff Size: Adult Regular)   Pulse (!) 48   Wt 63.1 kg (139 lb 1.6 oz)   SpO2 99%   BMI 22.28 kg/m       GENERAL: Appears comfortable, in no acute distress.   HEENT: Eye symmetrical, no discharge or icterus bilaterally. Mucous membranes moist and without lesions.  CV: RRR, +S1S2, no murmur, rub, or gallop. JVP < 6cm.   RESPIRATORY: Respirations regular, even, and unlabored. Lungs CTA  throughout.   GI: Soft and non distended with normoactive bowel sounds present in all quadrants. No tenderness, rebound, guarding. No hepatomegaly.   EXTREMITIES: no peripheral edema. 2+ bilateral pedal pulses.   NEUROLOGIC: Alert and oriented x 3. No focal deficits.   MUSCULOSKELETAL: No joint swelling or tenderness.   SKIN: No jaundice. No rashes or lesions.     Labs, reviewed with patient in clinic today:  CBC RESULTS:  Lab Results   Component Value Date    WBC 5.3 02/07/2024    WBC 12.2 (H) 05/26/2021    RBC 4.05 (L) 02/07/2024    RBC 3.60 (L) 05/26/2021    HGB 12.4 (L) 02/07/2024    HGB 12.0 (L) 05/26/2021    HCT 37.1 (L) 02/07/2024    HCT 34.8 (L) 05/26/2021    MCV 92 02/07/2024    MCV 97 05/26/2021    MCH 30.6 02/07/2024    MCH 33.3 (H) 05/26/2021    MCHC 33.4 02/07/2024    MCHC 34.5 05/26/2021    RDW 14.4 02/07/2024    RDW 13.2 05/26/2021     02/07/2024     05/26/2021       CMP RESULTS:  Lab Results   Component Value Date     (L) 03/06/2024     10/27/2023     05/26/2021    POTASSIUM 3.9 03/06/2024    POTASSIUM 4.1 10/27/2023    POTASSIUM 4.5 05/26/2021    CHLORIDE 95 (L) 03/06/2024    CHLORIDE 103 10/27/2023    CHLORIDE 105 05/26/2021    CO2 30 (H) 03/06/2024    CO2 27 10/27/2023    CO2 29 05/26/2021    ANIONGAP 8 03/06/2024    ANIONGAP 7 05/26/2021     (H) 03/06/2024     (H) 11/01/2023     (H) 05/26/2021    BUN 25.0 (H) 03/06/2024    BUN 41 (H) 05/26/2021    CR 1.01 03/06/2024    CR 0.80 05/26/2021    GFRESTIMATED 78 03/06/2024    GFRESTIMATED >60 10/26/2023    GFRESTIMATED 90 05/26/2021    GFRESTBLACK >90 05/26/2021    PRANAV 9.2 03/06/2024    PRANAV 8.3 (L) 05/26/2021    BILITOTAL 0.6 02/07/2024    BILITOTAL 0.5 03/20/2018    ALBUMIN 4.1 02/07/2024    ALBUMIN 3.7 03/20/2018    ALKPHOS 95 02/07/2024    ALKPHOS 82 03/20/2018    ALT 29 02/07/2024    ALT 22 03/20/2018    AST 35 02/07/2024    AST 20 03/20/2018        INR RESULTS:  Lab Results   Component Value  Date    INR 1.44 (H) 12/27/2023    INR 1.15 (H) 05/26/2021       Lab Results   Component Value Date    MAG 2.1 02/07/2024     Lab Results   Component Value Date    NTBNPI 4,102 (H) 01/12/2024     Lab Results   Component Value Date    NTBNP 3,994 (H) 02/07/2024       Cardiac Diagnostics:    2/19/2024 Echo  Interpretation Summary     1. The left ventricle is moderately dilated. Left ventricular hypertrophy is  noted by two-dimensional echocardiography. Proximal septal thickening is  noted. Left ventricular systolic function is moderate to severely reduced. The  visual ejection fraction is 25-30%. Biplane LVEF is 25%. Diastolic Doppler  findings (E/E' ratio and/or other parameters) suggest left ventricular filling  pressures are increased. There is severe hypokinesis to akinesis of the mid  anterior/anterolateral/anteroseptal/inferoseptal walls and all apical segments  of the left ventricle. There is no thrombus seen in the left ventricle.  2. The right ventricle is normal size. The right ventricular systolic function  is normal.  3. The left atrium is moderately dilated. Right atrial size is normal. There  is no color Doppler evidence of an atrial shunt.  4. There is moderate to mod-severe (2-3+) mitral regurgitation.  5. There is mild to moderate (1-2+) tricuspid regurgitation.Right ventricular  systolic pressure is elevated, consistent with moderate pulmonary  hypertension.  6. No pericardial effusion.  7. In direct comparison to the previous study dated 10/30/2023, there has been  an interval increase in the degree of mitral regurgitation. The other findings  are similar.    12/6/2023 CMR  Clinical history: 73 year old with anterior STEMI, cardiac arrest in Oct 2023     Comparison CMR: none     1. The left ventricle is severely enlarged. There is wall thinning and akinesis of the mid-distal anterior,  mid anteroseptum, distal septum and the apex  The global systolic function is severely reduced. The LVEF is  28%.      2. The right ventricle is normal in cavity size. The global systolic function is normal. The RVEF is 52%.      3. The right atrium is normal and the left atrium is severely enlarged.     4. There is mild mitral regurgitation.      5. There is transmural late gadolinium enhancement in mid-distal anterior, mid anteroseptum, distal  sepum  and the apex consistent with a large infarction in the LAD territory.      6. There is no pericardial effusion.     7. There is no intracardiac thrombus.     8. There are bilateral pleural effusions.        CONCLUSIONS:   Ischemic cardiomyopathy with a large anteroapical infarction.   Severely reduced left ventricular function and normal right ventricular function, LVEF 28% and RVEF 52%.       10/30/2023 Echo  Interpretation Summary  Ischemic CM. Large LAD MI. Left ventricular function is decreased. The  ejection fraction is 20-30% (severely reduced).  Global right ventricular function is normal.  No significant valvular abnormalities present.  IVC diameter <2.1 cm collapsing >50% with sniff suggests a normal RA pressure  of 3 mmHg.  No pericardial effusion is present.  No significant changes noted.      Assessment and Plan:   Mr. Aguila is a 73 year old male with medical history pertinent for anterior STEMI s/p PCI to the pLAD on 10/26/23 with a VT/VF arrest the next day (10/27) with patent stents and unchanged anatomy on repeat angiogram. Placed on amiodarone and discharged 11/03/23, ICD was not indicated as this was within 48h of his MI. His EF prior to discharge was 20-30% with a large area of akinesis in the LAD placed on apixaban due to this. He presents to [facility] clinic for routine follow up.     Euvolemic by exam. BPs are low stable. No room for further up titration of GDMT. Review of labs are stable. Chronic hyponatremia, Na 133. Renal function normal. By most recent echo, EF remains poor. Will defer to Dr. Cruz about further risk stratification with right heart cath and  cardiopulmonary stress test. He has already been referred to EP for consideration of ICD for SCD prevention. No medication changes today.    # Chronic systolic heart failure/HFrEF secondary to ICM (EF 20-30% by TTE 10/23)  Stage C. NYHA Class II-III.    Fluid status: euvolemic  ACEi/ARB/ARNi/afterload reduction: lisinopril 1.25 mg daily, hold for SBP < 90  BB: metoprolol succinate 12.5 mg HS, hold for SBP < 90, HR < 50  Aldosterone antagonist: deferred while other medical therapy is prioritized, unable to start d/t hypotension   SGLT2i: deferred while other medical therapy is prioritized  SCD prophylaxis: persistently reduced EF <35%, referred to EP   NSAID use: contraindicated  Sleep apnea evaluation: eval with sleep medicine 3/12/24    # CAD s/p PCI to LAD  # STEMI  # VT/VF arrest  - on plavix and apixaban (akinesis of the mid-distal anterior,mid anteroseptum, distal septum and the apex)      Follow up:  - EP with Dr. Black 4/4/24  - Dr. Cruz 4/15/24  - C[facility] 3 months       Sirisha Arias DNP, NP-C  Advance Heart Failure  3/6/24

## 2024-03-05 NOTE — PROGRESS NOTES
[facility] Hematology and Oncology Consult Note    Patient: Duane C Johnson  MRN: [id number]  Date of Service: 03/05/2024      Reason for Visit    Chief Complaint   Patient presents with    Oncology Clinic Visit     Prostate cancer - New consult         Assessment/Plan    Problem List Items Addressed This Visit          Urinary    Prostate cancer (H)     Prostate cancer  [place] 4+3 = 7  Grade group 3  Unfavorable intermediate risk  I reviewed his pathology report and MRI/CT scan images and report personally.  I also independently interpreted the results to my ability.     Recently diagnosed localized prostate cancer.  PSA is not significantly elevated.  On the biopsy his [place] score was 4+3=7 which belongs to grade group 3.  No evidence of any distant mets on the available imaging studies so far.  So he falls into unfavorable intermediate risk category.  Not a candidate for surgery due to significant cardiovascular disease history.  Plan is to proceed with EBRT.  I think in this setting there is a role for ADT which has shown to decrease risk of local and distant recurrence when combined with the radiation therapy.  Reviewed the rationale behind this.  Reviewed the potential side effects and complications associated with it.  Total duration of treatment will be up to 6 months.  I will touch base with radiation oncology.  I provided him the information regarding Lupron.  He still needs fiducials and SpaceOAR placement by urology.  Will be starting treatment pretty soon.    He did mention to me that at the time of bone scan technetium 99 contrast did spill into the soft tissue in his left cubital fossa.  He has a small bump there.  No significant pain but it is a bit tender to touch.  He thinks the swelling has come down but has become more harder now.  No discoloration noted.  He wanted us to be aware of this.    ECOG Performance    1 - Can't do physically strenuous work, but fully ambyulatory and can do  light sedentary work    Problem List    Patient Active Problem List   Diagnosis    Hyperlipidemia LDL goal <130    Prostate cancer (H)    ST elevation MI (STEMI) (H)    Tobacco dependence syndrome    Adiposity    Multifocal pneumonia    Elevated brain natriuretic peptide (BNP) level    Primary hypertension    SOB (shortness of breath)    Coronary artery disease due to calcified coronary lesion    Pneumonia of left upper lobe due to infectious organism    Systolic CHF (H)    Hx of cardiac arrest    Anxiety    Congestive heart failure, unspecified HF chronicity, unspecified heart failure type (H)    ST elevation myocardial infarction involving left anterior descending (LAD) coronary artery (H)    Acute on chronic congestive heart failure, unspecified heart failure type (H)    Acute decompensated heart failure (H)    Atherosclerosis of native coronary artery of native heart with angina pectoris (H24)    Left inguinal hernia     ______________________________________________________________________________    Staging History     Cancer Staging   No matching staging information was found for the patient.        History of presenting illness:  Duane C Johnson is a 74-year-old male with history of coronary artery disease, congestive heart failure, hypertension, STEMI among other medical problems with a new diagnosis of locally advanced prostate cancer is seen in the oncology clinic to discuss role of ADT in this setting.    He had elevated PSA at 4.61 last year for which she was seen in the urology clinic.  He had an MRI of the prostate in June 2023 which showed a PI RADS 4 lesion located in the right mid gland peripheral zone at the 7 o'clock position.  There was low likelihood of minimal extraprostatic extension minimal extraprostatic extension.  There were 2 other additional PI RADS 4 lesions.  No evidence of direct invasion of the neurovascular bundle.  No evidence of any pelvic adenopathy.  He had a biopsy in  September 2023 which showed prostatic adenocarcinoma Sarah 4+3 equal 7.  He was initially scheduled to undergo prostatectomy but this before the surgery he had a STEMI and had complications with CHF.  Has had multiple hospitalizations since then.  He was deemed not a candidate for any surgical resection and was referred to radiation oncology who has already met.  Based on the documentation from rad unk it looks like the plan was to proceed with EBRT and we have been asked to evaluate for the role of ADT in this setting.    He had a repeat MRI and bone scan done recently.  Bone scan negative for any metastatic disease.  No evidence of distant soft tissue metastatic disease either.    Denies any chest pain.  Does have some shortness of with on exertion.    Previous smoker but quit in 2023.  Has a 30-pack-year smoking history.  Family history of uterine cancer in his sister.    Pathology:  Final Diagnosis   A.  Prostate, target 1, biopsy:  - Prostatic adenocarcinoma  - Ashley score 6 (3+3)  - Grade Group 1  - Extent: Involves 2 of 2 cores (7 mm, 70% and 7 mm, 70%)  - Perineural invasion is present     B.  Prostate, target 2, biopsy:   - Prostatic adenocarcinoma  - Sarah score 7 (4+3)  - Grade group 3  - Extent: Involves 1 of 2 cores (3 mm, 20%)     C.  Prostate, target 3, biopsy:  - Benign prostate tissue     D.  Prostate, right base, biopsy:  - Prostatic adenocarcinoma  - Sarah score 6 (3+3)  - Grade Group 1  - Extent: Involves 1 of 2 cores (1 mm, 10%)     E.  Prostate, right mid, biopsy:  - Prostatic adenocarcinoma  - Ashley score 6 (3+3)  - Grade Group 1  - Extent: Involves 2 of 2 cores (7 mm, 70% and 3 mm, 25%)     F.  Prostate, right apex, biopsy:  - Prostatic adenocarcinoma  - Sarah score 6 (3+3)  - Grade Group 1  - Extent: Involves 2 of 2 cores (5 mm, 40% and 2 mm, 20%)     G.  Prostate, left base, biopsy:  - Prostatic adenocarcinoma  - Sarah score 7 (4+3)  - Grade group 3  - Extent: Involves 2 of 2  cores (2 mm, 10% and 1 mm, 10%)     H.  Prostate, left mid, biopsy:  - Prostatic adenocarcinoma  - Sarah score 7 (4+3)  - Grade group 3  - Extent: Involves 2 of 2 cores (2 mm, 20% and 3 mm, 20%)     I.  Prostate, left apex, biopsy:  - Prostatic adenocarcinoma  - [place] score 7 (4+3)  - Grade group 3  - Extent: Involves 1 of 2 cores (3 mm, 10%)         Past History    Past Medical History:   Diagnosis Date    Benign essential hypertension     CAD (coronary artery disease)     Chronic systolic heart failure (H)     Hypertension     ST elevation myocardial infarction (STEMI), unspecified artery (H)     Ventricular fibrillation (H)     Family History   Problem Relation Age of Onset    Other Cancer Mother     Obesity Mother     GI problems Father     Uterine Cancer Sister       Past Surgical History:   Procedure Laterality Date    COLONOSCOPY N/A 3/23/2023    Procedure: COLONOSCOPY, FLEXIBLE, WITH LESION REMOVAL USING SNARE;  Surgeon: Jose Faustin MD;  Location: [facility]    CV CENTRAL VENOUS CATHETER PLACEMENT N/A 10/26/2023    Procedure: Central Venous Catheter Placement;  Surgeon: Rob Lyles MD;  Location: [facility] CARDIAC CATH LAB    CV CORONARY ANGIOGRAM N/A 10/27/2023    Procedure: Coronary Angiogram;  Surgeon: Rob Lyles MD;  Location: [facility] CARDIAC CATH LAB    CV CORONARY ANGIOGRAM N/A 10/26/2023    Procedure: Coronary Angiogram;  Surgeon: Rob Lyles MD;  Location: [facility] CARDIAC CATH LAB    CV LEFT HEART CATH N/A 10/26/2023    Procedure: Left Heart Catheterization;  Surgeon: Rob Lyles MD;  Location: [facility] CARDIAC CATH LAB    CV PCI N/A 10/27/2023    Procedure: Percutaneous Coronary Intervention;  Surgeon: Rob Lyles MD;  Location: [facility] CARDIAC CATH LAB    CV PCI STENT DRUG ELUTING N/A 10/26/2023    Procedure: Percutaneous Coronary Intervention Stent;  Surgeon: Rob Lyles MD;  Location: [facility] CARDIAC CATH LAB     Social History     Socioeconomic History    Marital status:      Spouse name: Not on file    Number of children: Not on file    Years of education: Not on file    Highest education level: Not on file   Occupational History    Not on file   Tobacco Use    Smoking status: Former     Packs/day: 0.25     Years: 30.00     Additional pack years: 0.00     Total pack years: 7.50     Types: Cigarettes     Quit date: 10/26/2023     Years since quittin.3     Passive exposure: Current    Smokeless tobacco: Never    Tobacco comments:     Quit 10/26/2023   Vaping Use    Vaping Use: Never used   Substance and Sexual Activity    Alcohol use: Yes     Comment: 1-2 beers per day    Drug use: No    Sexual activity: Yes     Partners: Female     Birth control/protection: None   Other Topics Concern    Parent/sibling w/ CABG, MI or angioplasty before 65F 55M? No   Social History Narrative    Not on file     Social Determinants of Health     Financial Resource Strain: Low Risk  (2024)    Financial Resource Strain     Within the past 12 months, have you or your family members you live with been unable to get utilities (heat, electricity) when it was really needed?: No   Food Insecurity: Low Risk  (2024)    Food Insecurity     Within the past 12 months, did you worry that your food would run out before you got money to buy more?: No     Within the past 12 months, did the food you bought just not last and you didn t have money to get more?: No   Transportation Needs: Low Risk  (2024)    Transportation Needs     Within the past 12 months, has lack of transportation kept you from medical appointments, getting your medicines, non-medical meetings or appointments, work, or from getting things that you need?: No   Physical Activity: Inactive (2023)    Exercise Vital Sign     Days of Exercise per Week: 0 days     Minutes of Exercise per Session: 0 min   Stress: Not on file   Social Connections: Unknown (2023)     Social Connection and Isolation Panel [NHANES]     Frequency of Communication with Friends and Family: Not on file     Frequency of Social Gatherings with Friends and Family: Not on file     Attends Sabianist Services: Not on file     Active Member of Clubs or Organizations: Not on file     Attends Club or Organization Meetings: Not on file     Marital Status:    Interpersonal Safety: Low Risk  (10/9/2023)    Interpersonal Safety     Do you feel physically and emotionally safe where you currently live?: Yes     Within the past 12 months, have you been hit, slapped, kicked or otherwise physically hurt by someone?: No     Within the past 12 months, have you been humiliated or emotionally abused in other ways by your partner or ex-partner?: No   Housing Stability: Low Risk  (1/2/2024)    Housing Stability     Do you have housing? : Yes     Are you worried about losing your housing?: No        Allergies    Allergies   Allergen Reactions    Brilinta [Ticagrelor] Other (See Comments) and Difficulty breathing     Per pt and spouse, hyperventilation.    Clonazepam Other (See Comments)     Per spouse, acted like a zombie and he was shaky, could barely talk.       Review of Systems    Pertinent items are noted in HPI.      Physical Exam        3/5/2024    12:57 PM   Oncology Vitals   Height 168 cm   Weight 61.689 kg   BSA (m2) 1.7 m2   /59   Pulse 66   Temp 97.6  F (36.4  C)   Temp src Tympanic   SpO2 96 %   Pain Score 0 (None)       General: alert and cooperative  HEENT: Head: Normal, normocephalic, atraumatic.  Eye: Normal external eye, conjunctiva, lids cornea, DOMINICK.  Chest: Clear to auscultation bilaterally  Cardiac: S1, S2 normal, regular rate and rhythm  Abdomen: abdomen is soft without significant tenderness, masses, organomegaly or guarding  Extremities: atraumatic, no peripheral edema  CNS: Alert and oriented x3, neurologic exam grossly normal.  Lymphatics: No bilateral cervical, axillary,  supraclavicular or inguinal adenopathy noted      Lab Results    No results found for this or any previous visit (from the past 168 hour(s)).    Imaging Results    NM Bone Scan Whole Body    Result Date: 2024  EXAM: NM BONE SCAN WHOLE BODY LOCATION: [facility] DATE: 2024 INDICATION: 73yo M w  significant cardiovascular disease diagnosed with [place] 4+3, PSA 4.61 high volume unfavorable intermediate risk prostate cancer COMPARISON: CT chest 2023. TECHNIQUE: 25.0 mCi technetium-99m MDP, IV. Anterior and posterior delayed whole-body images at 3 hours with additional spot images of the ribs. FINDINGS: No specific scintigraphic findings for osteoblastic metastatic disease. Uptake within the bilateral anterior third, fourth, fifth, and sixth ribs corresponds to sites of prior fractures on prior CTs and is compatible with posttraumatic uptake. Degenerative uptake of the right greater than left shoulders as well as the bilateral wrists, knees, and right greater than left feet.     IMPRESSION: No evidence of osteoblastic metastatic disease.    Echocardiogram Complete    Result Date: 2024  [id number]^[id number]^VALDO  [facility] [address] [place] [address]  Name: JOHNSON, DUANE C MRN: [id number] : 1950 Study Date: 2024 10:34 AM Age: 74 yrs Gender: Male Patient Location: [facility] Reason For Study: Chronic combined systolic and diastolic heart failure (H) Ordering Physician: VALDO BOONE Referring Physician: Jose Coles Performed By: Annabelle Shin RDCS  BSA: 1.7 m2 Height: 66 in Weight: 138 lb HR: 51 BP: 95/60 mmHg ______________________________________________________________________________ Procedure Complete Echo Adult. Optison (NDC #1700-7162) given intravenously. ______________________________________________________________________________ Interpretation Summary  1. The left  ventricle is moderately dilated. Left ventricular hypertrophy is noted by two-dimensional echocardiography. Proximal septal thickening is noted. Left ventricular systolic function is moderate to severely reduced. The visual ejection fraction is 25-30%. Biplane LVEF is 25%. Diastolic Doppler findings (E/E' ratio and/or other parameters) suggest left ventricular filling pressures are increased. There is severe hypokinesis to akinesis of the mid anterior/anterolateral/anteroseptal/inferoseptal walls and all apical segments of the left ventricle. There is no thrombus seen in the left ventricle. 2. The right ventricle is normal size. The right ventricular systolic function is normal. 3. The left atrium is moderately dilated. Right atrial size is normal. There is no color Doppler evidence of an atrial shunt. 4. There is moderate to mod-severe (2-3+) mitral regurgitation. 5. There is mild to moderate (1-2+) tricuspid regurgitation.Right ventricular systolic pressure is elevated, consistent with moderate pulmonary hypertension. 6. No pericardial effusion. 7. In direct comparison to the previous study dated 10/30/2023, there has been an interval increase in the degree of mitral regurgitation. The other findings are similar. ______________________________________________________________________________ Left Ventricle The left ventricle is moderately dilated. Left ventricular hypertrophy is noted by two-dimensional echocardiography. Proximal septal thickening is noted. Left ventricular systolic function is moderate to severely reduced. The visual ejection fraction is 25-30%. Biplane LVEF is 25%. Diastolic Doppler findings (E/E' ratio and/or other parameters) suggest left ventricular filling pressures are increased. There is severe hypokinesis to akinesis of the mid anterior/anterolateral/anteroseptal/inferoseptal walls and all apical segments of the left ventricle. There is no thrombus seen in the left ventricle.  Right  Ventricle The right ventricle is normal size. The right ventricular systolic function is normal.  Atria The left atrium is moderately dilated. Right atrial size is normal. There is no color Doppler evidence of an atrial shunt.  Mitral Valve There is moderate to mod-severe (2-3+) mitral regurgitation.  Tricuspid Valve There is mild to moderate (1-2+) tricuspid regurgitation. The right ventricular systolic pressure is approximated at 51.6 mmHg plus the right atrial pressure. IVC diameter <2.1 cm collapsing >50% with sniff suggests a normal RA pressure of 3 mmHg. Right ventricular systolic pressure is elevated, consistent with moderate pulmonary hypertension.  Aortic Valve There is mild trileaflet aortic sclerosis. No aortic regurgitation is present. No aortic stenosis is present.  Pulmonic Valve There is mild (1+) pulmonic valvular regurgitation. There is no pulmonic valvular stenosis.  Vessels The aortic root is normal size. Normal size ascending aorta. The inferior vena cava is normal.  Pericardium There is no pericardial effusion. Moderate left pleural effusion.  Rhythm Sinus rhythm was noted. ______________________________________________________________________________ MMode/2D Measurements & Calculations IVSd: 1.3 cm  LVIDd: 6.8 cm LVIDs: 5.8 cm LVPWd: 0.83 cm FS: 14.4 % LV mass(C)d: 333.5 grams LV mass(C)dI: 195.2 grams/m2 Ao root diam: 3.3 cm asc Aorta Diam: 3.5 cm Ao root diam index Ht(cm/m): 2.0 Ao root diam index BSA (cm/m2): 1.9 Asc Ao diam index BSA (cm/m2): 2.0 Asc Ao diam index Ht(cm/m): 2.1 LA Volume (BP): 78.2 ml  LA Volume Index (BP): 45.7 ml/m2 RV Base: 3.5 cm RWT: 0.24 TAPSE: 1.9 cm  Doppler Measurements & Calculations MV E max ivan: 83.3 cm/sec MV A max ivan: 28.7 cm/sec MV E/A: 2.9 MV dec time: 0.15 sec MR PISA: 1.6 cm2 MR ERO: 0.16 cm2 MR volume: 23.9 ml PA acc time: 0.15 sec TR max ivan: 359.0 cm/sec TR max P.6 mmHg E/E' av.5 Lateral E/e': 7.5 Medial E/e': 33.6 RV S Ivan: 14.1 cm/sec   ______________________________________________________________________________ Report approved by: Eusebio Maria 02/19/2024 12:39 PM       MR Pelvis (Intrapelvic Organs) wo&w Contrast    Result Date: 2/16/2024  EXAM: MR ABDOMEN W/O and W CONTRAST, MR PELVIS (INTRAPELVIC ORGANS) W/O and W CONTRAST LOCATION: [facility] DATE: 2/15/2024 INDICATION: 75 yo male with significant cardiovascular disease diagnosed with [place] 4+3, PSA 4.61 high volume unfavorable intermediate risk prostate cancer.  This is a staging MRI. COMPARISON: MRI prostate 06/01/2023. TECHNIQUE: Routine MRI abdomen and pelvis protocol including T1 in/out phase, diffusion, multiplane T2, and dynamic T1 without and with IV contrast. CONTRAST: 6mL Gadavist. FINDINGS: LOWER CHEST: Small bibasilar pleural effusions. HEPATOBILIARY: No fat or iron deposition identified. No worrisome hepatic observation. Cholelithiasis. No biliary ductal dilatation. PANCREAS: Normal. SPLEEN: Normal. ADRENAL GLANDS: 1.4 cm left adrenal nodule. This appears to show signal dropout on out-of-phase series 9 image 43 suggestive of an adenoma. Normal right adrenal. KIDNEYS/BLADDER: No hydronephrosis. No significant renal abnormality. A few bladder diverticula. BOWEL: No acute abnormality. Right inguinal hernia contains a herniated small bowel loop. Hernia sac is approximately 3.9 cm series 18 image 100. No associated obstruction or inflammation at this time. Fat-containing left inguinal hernia measuring 4.1 cm. Colonic diverticula. LYMPH NODES: No visualized enlarged lymph nodes. VASCULATURE: Unremarkable. PELVIC ORGANS: This is not a formal prostate specific MRI. However, again seen is an 11 mm right posterior 7:00 prostate low T2 signal lesion series 40 image 28. This appeared to represent a high-grade lesion on the prior prostate MRI from 06/01/2023. The additional previously described 5:00 and 1:00 lesions on the prior exam are not able to be  well-visualized on this exam. No clear mass effect from the prostate. MUSCULOSKELETAL: No visible lesion, but correlate with bone scanning for more sensitive assessment.     IMPRESSION: 1.  No convincing metastatic disease within the limits of the exam. 2.  The previously described prostate lesions on the dedicated prostate MRI from 06/01/2023 are limited in view. The right posterior 7:00 position nodule appears stable in size while the other lesions cannot be visualized. Please note that this is not a dedicated prostate specific MRI examination. 3.  Small bibasilar pleural effusions. 4.  Right inguinal hernia contains a herniated small bowel loop, but there is no bowel obstruction at this time. There is also fat herniating into the left inguinal canal.    MR Abdomen w/o & w Contrast    Result Date: 2/16/2024  EXAM: MR ABDOMEN W/O and W CONTRAST, MR PELVIS (INTRAPELVIC ORGANS) W/O and W CONTRAST LOCATION: [facility] DATE: 2/15/2024 INDICATION: 75 yo male with significant cardiovascular disease diagnosed with [place] 4+3, PSA 4.61 high volume unfavorable intermediate risk prostate cancer.  This is a staging MRI. COMPARISON: MRI prostate 06/01/2023. TECHNIQUE: Routine MRI abdomen and pelvis protocol including T1 in/out phase, diffusion, multiplane T2, and dynamic T1 without and with IV contrast. CONTRAST: 6mL Gadavist. FINDINGS: LOWER CHEST: Small bibasilar pleural effusions. HEPATOBILIARY: No fat or iron deposition identified. No worrisome hepatic observation. Cholelithiasis. No biliary ductal dilatation. PANCREAS: Normal. SPLEEN: Normal. ADRENAL GLANDS: 1.4 cm left adrenal nodule. This appears to show signal dropout on out-of-phase series 9 image 43 suggestive of an adenoma. Normal right adrenal. KIDNEYS/BLADDER: No hydronephrosis. No significant renal abnormality. A few bladder diverticula. BOWEL: No acute abnormality. Right inguinal hernia contains a herniated small bowel loop. Hernia  sac is approximately 3.9 cm series 18 image 100. No associated obstruction or inflammation at this time. Fat-containing left inguinal hernia measuring 4.1 cm. Colonic diverticula. LYMPH NODES: No visualized enlarged lymph nodes. VASCULATURE: Unremarkable. PELVIC ORGANS: This is not a formal prostate specific MRI. However, again seen is an 11 mm right posterior 7:00 prostate low T2 signal lesion series 40 image 28. This appeared to represent a high-grade lesion on the prior prostate MRI from 06/01/2023. The additional previously described 5:00 and 1:00 lesions on the prior exam are not able to be well-visualized on this exam. No clear mass effect from the prostate. MUSCULOSKELETAL: No visible lesion, but correlate with bone scanning for more sensitive assessment.     IMPRESSION: 1.  No convincing metastatic disease within the limits of the exam. 2.  The previously described prostate lesions on the dedicated prostate MRI from 06/01/2023 are limited in view. The right posterior 7:00 position nodule appears stable in size while the other lesions cannot be visualized. Please note that this is not a dedicated prostate specific MRI examination. 3.  Small bibasilar pleural effusions. 4.  Right inguinal hernia contains a herniated small bowel loop, but there is no bowel obstruction at this time. There is also fat herniating into the left inguinal canal.     A total of 45 minutes was spent today on this visit including face to face conversation with the patient, EMR review (labs, imaging studies, pathology reports and outside records), counseling and care co-ordination and documentation.    Signed by: Adama Youngblood MD

## 2024-03-05 NOTE — PROGRESS NOTES
"Oncology Rooming Note    March 5, 2024 12:57 PM   Duane C Johnson is a 74 year old male who presents for:    Chief Complaint   Patient presents with    Oncology Clinic Visit     Prostate cancer - New consult     Initial Vitals: /59 (BP Location: Right arm, Patient Position: Sitting, Cuff Size: Adult Regular)   Pulse 66   Temp 97.6  F (36.4  C) (Tympanic)   Resp 12   Ht 1.683 m (5' 6.25\")   Wt 61.7 kg (136 lb)   SpO2 96%   BMI 21.79 kg/m   Estimated body mass index is 21.79 kg/m  as calculated from the following:    Height as of this encounter: 1.683 m (5' 6.25\").    Weight as of this encounter: 61.7 kg (136 lb). Body surface area is 1.7 meters squared.  No Pain (0) Comment: Data Unavailable   No LMP for male patient.  Allergies reviewed: Yes  Medications reviewed: No, wife takes care of medication    Medications: Medication refills not needed today.  Pharmacy name entered into [facility]: [facility] [place], [place] - [address] [place] [address]    Frailty Screening:   Is the patient here for a new oncology consult visit in cancer care? 2. No      Clinical concerns:  None      Anna Marie Velez CMA              "

## 2024-03-06 ENCOUNTER — OFFICE VISIT (OUTPATIENT)
Dept: CARDIOLOGY | Facility: CLINIC | Age: 74
End: 2024-03-06
Attending: INTERNAL MEDICINE
Payer: MEDICARE

## 2024-03-06 ENCOUNTER — LAB (OUTPATIENT)
Dept: LAB | Facility: CLINIC | Age: 74
End: 2024-03-06
Attending: INTERNAL MEDICINE
Payer: MEDICARE

## 2024-03-06 VITALS
DIASTOLIC BLOOD PRESSURE: 67 MMHG | SYSTOLIC BLOOD PRESSURE: 99 MMHG | BODY MASS INDEX: 22.28 KG/M2 | WEIGHT: 139.1 LBS | OXYGEN SATURATION: 99 % | HEART RATE: 48 BPM

## 2024-03-06 DIAGNOSIS — I50.20 HEART FAILURE WITH REDUCED EJECTION FRACTION, NYHA CLASS I (H): ICD-10-CM

## 2024-03-06 LAB
ANION GAP SERPL CALCULATED.3IONS-SCNC: 8 MMOL/L (ref 7–15)
BUN SERPL-MCNC: 25 MG/DL (ref 8–23)
CALCIUM SERPL-MCNC: 9.2 MG/DL (ref 8.8–10.2)
CHLORIDE SERPL-SCNC: 95 MMOL/L (ref 98–107)
CREAT SERPL-MCNC: 1.01 MG/DL (ref 0.67–1.17)
DEPRECATED HCO3 PLAS-SCNC: 30 MMOL/L (ref 22–29)
EGFRCR SERPLBLD CKD-EPI 2021: 78 ML/MIN/1.73M2
GLUCOSE SERPL-MCNC: 100 MG/DL (ref 70–99)
POTASSIUM SERPL-SCNC: 3.9 MMOL/L (ref 3.4–5.3)
SODIUM SERPL-SCNC: 133 MMOL/L (ref 135–145)

## 2024-03-06 PROCEDURE — 36415 COLL VENOUS BLD VENIPUNCTURE: CPT | Performed by: PATHOLOGY

## 2024-03-06 PROCEDURE — 99214 OFFICE O/P EST MOD 30 MIN: CPT | Performed by: NURSE PRACTITIONER

## 2024-03-06 PROCEDURE — G0463 HOSPITAL OUTPT CLINIC VISIT: HCPCS | Performed by: NURSE PRACTITIONER

## 2024-03-06 PROCEDURE — 80048 BASIC METABOLIC PNL TOTAL CA: CPT | Performed by: PATHOLOGY

## 2024-03-06 ASSESSMENT — PAIN SCALES - GENERAL: PAINLEVEL: NO PAIN (0)

## 2024-03-06 NOTE — NURSING NOTE
Chief Complaint   Patient presents with    Follow Up     Return CORE, HFrEF, labs prior       Vitals were taken, medications reconciled.    Broderick Ibrahim, Facilitator   1:36 PM

## 2024-03-06 NOTE — PROGRESS NOTES
Optimal Vascular Metrics    Blood Pressure   BP < 140/90 Yes    On Aspirin  No: Contraindicated due to: currently on eliquis and plavix    On Statin  Yes    Tobacco use  No

## 2024-03-06 NOTE — LETTER
3/6/2024      RE: Duane C Johnson  [id number] [address] [place] [id number]       Dear Colleague,    Thank you for the opportunity to participate in the care of your patient, Duane C Johnson, at the [facility] [place] [facility]. Please see a copy of my visit note below.      [facility] Cardiology   CORE Clinic      HPI:   Mr. Aguila is a 74 year old male with medical history pertinent for anterior STEMI s/p PCI to the pLAD on 10/26/23 with a VT/VF arrest the next day (10/27) with patent stents and unchanged anatomy on repeat angiogram. Placed on amiodarone and discharged 11/03/23, ICD was not indicated as this was within 48h of his MI. His EF prior do discharge was 20-30% with a large area of akinesis in the LAD placed on apixaban due to this. He presents to CORE for routine follow up.      Mr. Aguila was readmitted 11/6-11/20/23 with ADHF and treated with IV lasix. He had some lightheadedness and his metoprolol dose was adjusted. Brillinta changed to the plavix due to SOB. Seen in the ED on 11/22/23 for hypotension with BPs 80s/60s. Holding lasix, metoprolol, and losartan. Of note, he was asymptomatic with hypotension.     At initial CORE visit in 11/2023, reported feeling well. Had been frequently holding losartan and metoprolol because of prescribed hold parameters. While BPs were lower with systolic pressures in the 90s-100s, he was asymptomatic. We liberalized hold parameters in order to allow for more consistency with taking GDMT.      Since our initial visit, Mr. Aguila was seen in the ED on 12/20 for chest pain with unremarkable work-up, and then admitted 12/25-12/28 for shortness of breath with bilateral pleural effusions s/p thoracentesis and ADHF exacerbation. He returned to the ED on 12/30 with SOB and was IV diuresed with bumex and his outpatient regimen was switched to PO bumex. He was instructed to hold losartan and metoprolol for  hypotension.     Established with Dr. Cruz on 2/7/24. At this visit he was re-started on metoprolol succinate 12.5 mg daily. Only tolerating very small dose of lisinopril 1.25 mg daily. GDMT limited d/t hypotension. Most recently seen in the critical care survivor clinic on 2/19/24. No medication changes. TTE 2/19 showed persistently low EF 25-30%, mod-severe MR, mild-mod TR. Referred to EP. At this visit Duane also reported some forgetfulness. Referred to neuropysch.    Today, Mr. Aguila presents to clinic alone. He reports feeling well. He has complete cardiac rehab. [place] and takes daily walks. Will have occasional lightheadedness, finds it improves with activity. Denies CP, palpitations, lightheadedness, syncope. Denies orthopnea, PND, LE edema. Breathing has been much improve since thora and denies any acute SOB. DIOP is in proportion to exertion.     Wife closely monitors sodium and fluid intake. They have been adhering to a 1.5 L FR and 1500 mg sodium restriction.     Monitors daily BPs, /50s-60s, HR 45-60s  Holding Metoprolol for SBP < 90  Weight 138-140 lb    Cardiac Medications   - Amiodarone 200 mg daily   - Eliquis 5 mg BID  - Atorvastatin 80 mg daily   - Plavix 75 mg daily   - Bumex 1 mg daily   - Lisinopril 1.25 mg daily   - Metoprolol succinate 12.5 mg daily      PAST MEDICAL HISTORY:  Past Medical History:   Diagnosis Date    Benign essential hypertension     CAD (coronary artery disease)     Chronic systolic heart failure (H)     Hypertension     ST elevation myocardial infarction (STEMI), unspecified artery (H)     Ventricular fibrillation (H)        FAMILY HISTORY:  Family History   Problem Relation Age of Onset    Other Cancer Mother     Obesity Mother     GI problems Father     Uterine Cancer Sister        SOCIAL HISTORY:  Social History     Socioeconomic History    Marital status:      Spouse name: None    Number of children: None    Years of education: None     Highest education level: None   Tobacco Use    Smoking status: Former     Packs/day: .25     Types: Cigarettes     Passive exposure: Current    Smokeless tobacco: Never    Tobacco comments:     Quit 10/26/2023   Vaping Use    Vaping Use: Never used   Substance and Sexual Activity    Alcohol use: Yes     Comment: occas    Drug use: No    Sexual activity: Yes     Partners: Female     Birth control/protection: None   Other Topics Concern    Parent/sibling w/ CABG, MI or angioplasty before 65F 55M? No     Social Determinants of Health     Financial Resource Strain: Low Risk  (11/23/2023)    Financial Resource Strain     Within the past 12 months, have you or your family members you live with been unable to get utilities (heat, electricity) when it was really needed?: No   Food Insecurity: Low Risk  (11/23/2023)    Food Insecurity     Within the past 12 months, did you worry that your food would run out before you got money to buy more?: No     Within the past 12 months, did the food you bought just not last and you didn t have money to get more?: No   Transportation Needs: Low Risk  (11/23/2023)    Transportation Needs     Within the past 12 months, has lack of transportation kept you from medical appointments, getting your medicines, non-medical meetings or appointments, work, or from getting things that you need?: No   Physical Activity: Inactive (11/16/2023)    Exercise Vital Sign     Days of Exercise per Week: 0 days     Minutes of Exercise per Session: 0 min   Social Connections: Unknown (11/16/2023)    Social Connection and Isolation Panel [NHANES]     Marital Status:    Interpersonal Safety: Low Risk  (10/9/2023)    Interpersonal Safety     Do you feel physically and emotionally safe where you currently live?: Yes     Within the past 12 months, have you been hit, slapped, kicked or otherwise physically hurt by someone?: No     Within the past 12 months, have you been humiliated or emotionally abused in  other ways by your partner or ex-partner?: No   Housing Stability: Low Risk  (11/23/2023)    Housing Stability     Do you have housing? : Yes     Are you worried about losing your housing?: No       CURRENT MEDICATIONS:  acetaminophen (TYLENOL) 325 MG tablet, Take 650 mg by mouth every 6 hours as needed  amiodarone (PACERONE) 200 MG tablet, Take 1 tablet (200 mg) by mouth daily  apixaban ANTICOAGULANT (ELIQUIS) 5 MG tablet, Take 1 tablet (5 mg) by mouth 2 times daily  atorvastatin (LIPITOR) 80 MG tablet, Take 1 tablet (80 mg) by mouth daily  bumetanide (BUMEX) 1 MG tablet, Take 1 tablet (1 mg) by mouth daily  cetirizine (ZYRTEC) 10 MG tablet, Take 10 mg by mouth daily  clopidogrel (PLAVIX) 75 MG tablet, Take 1 tablet (75 mg) by mouth daily  escitalopram (LEXAPRO) 10 MG tablet, Take 10 mg by mouth daily  fish oil-omega-3 fatty acids 1000 MG capsule, Take 1 g by mouth 2 times daily Also contains another supplement  lisinopril (ZESTRIL) 2.5 MG tablet, Take 0.5 tablets (1.25 mg) by mouth daily HOLD for systolic blood pressure < 90  magnesium oxide (MAG-OX) 400 MG tablet, Take 1 tablet (400 mg) by mouth daily  melatonin 5 MG tablet, Take 1-2 tablets (5-10 mg) by mouth at bedtime  metoprolol succinate ER (TOPROL XL) 25 MG 24 hr tablet, Take 0.5 tablets (12.5 mg) by mouth daily Hold for systolic blood pressure less than 90.  multivitamin w/minerals (THERA-VIT-M) tablet, Take 1 tablet by mouth daily  potassium chloride ER (KLOR-CON M) 20 MEQ CR tablet, Take 1 tablet (20 mEq) by mouth daily  vitamin C (ASCORBIC ACID) 1000 MG TABS, Take 1,000 mg by mouth daily  hydrOXYzine HCl (ATARAX) 25 MG tablet, Take 1 tablet (25 mg) by mouth every 8 hours as needed for anxiety (Patient not taking: Reported on 2/2/2024)    No current facility-administered medications on file prior to visit.      ROS:   Refer to HPI    EXAM:  BP 99/67 (BP Location: Right arm, Patient Position: Chair, Cuff Size: Adult Regular)   Pulse (!) 48   Wt 63.1 kg  (139 lb 1.6 oz)   SpO2 99%   BMI 22.28 kg/m       GENERAL: Appears comfortable, in no acute distress.   HEENT: Eye symmetrical, no discharge or icterus bilaterally. Mucous membranes moist and without lesions.  CV: RRR, +S1S2, no murmur, rub, or gallop. JVP < 6cm.   RESPIRATORY: Respirations regular, even, and unlabored. Lungs CTA throughout.   GI: Soft and non distended with normoactive bowel sounds present in all quadrants. No tenderness, rebound, guarding. No hepatomegaly.   EXTREMITIES: no peripheral edema. 2+ bilateral pedal pulses.   NEUROLOGIC: Alert and oriented x 3. No focal deficits.   MUSCULOSKELETAL: No joint swelling or tenderness.   SKIN: No jaundice. No rashes or lesions.     Labs, reviewed with patient in clinic today:  CBC RESULTS:  Lab Results   Component Value Date    WBC 5.3 02/07/2024    WBC 12.2 (H) 05/26/2021    RBC 4.05 (L) 02/07/2024    RBC 3.60 (L) 05/26/2021    HGB 12.4 (L) 02/07/2024    HGB 12.0 (L) 05/26/2021    HCT 37.1 (L) 02/07/2024    HCT 34.8 (L) 05/26/2021    MCV 92 02/07/2024    MCV 97 05/26/2021    MCH 30.6 02/07/2024    MCH 33.3 (H) 05/26/2021    MCHC 33.4 02/07/2024    MCHC 34.5 05/26/2021    RDW 14.4 02/07/2024    RDW 13.2 05/26/2021     02/07/2024     05/26/2021       CMP RESULTS:  Lab Results   Component Value Date     (L) 03/06/2024     10/27/2023     05/26/2021    POTASSIUM 3.9 03/06/2024    POTASSIUM 4.1 10/27/2023    POTASSIUM 4.5 05/26/2021    CHLORIDE 95 (L) 03/06/2024    CHLORIDE 103 10/27/2023    CHLORIDE 105 05/26/2021    CO2 30 (H) 03/06/2024    CO2 27 10/27/2023    CO2 29 05/26/2021    ANIONGAP 8 03/06/2024    ANIONGAP 7 05/26/2021     (H) 03/06/2024     (H) 11/01/2023     (H) 05/26/2021    BUN 25.0 (H) 03/06/2024    BUN 41 (H) 05/26/2021    CR 1.01 03/06/2024    CR 0.80 05/26/2021    GFRESTIMATED 78 03/06/2024    GFRESTIMATED >60 10/26/2023    GFRESTIMATED 90 05/26/2021    GFRESTBLACK >90 05/26/2021    PRANAV 9.2  03/06/2024    PRANAV 8.3 (L) 05/26/2021    BILITOTAL 0.6 02/07/2024    BILITOTAL 0.5 03/20/2018    ALBUMIN 4.1 02/07/2024    ALBUMIN 3.7 03/20/2018    ALKPHOS 95 02/07/2024    ALKPHOS 82 03/20/2018    ALT 29 02/07/2024    ALT 22 03/20/2018    AST 35 02/07/2024    AST 20 03/20/2018        INR RESULTS:  Lab Results   Component Value Date    INR 1.44 (H) 12/27/2023    INR 1.15 (H) 05/26/2021       Lab Results   Component Value Date    MAG 2.1 02/07/2024     Lab Results   Component Value Date    NTBNPI 4,102 (H) 01/12/2024     Lab Results   Component Value Date    NTBNP 3,994 (H) 02/07/2024       Cardiac Diagnostics:    2/19/2024 Echo  Interpretation Summary     1. The left ventricle is moderately dilated. Left ventricular hypertrophy is  noted by two-dimensional echocardiography. Proximal septal thickening is  noted. Left ventricular systolic function is moderate to severely reduced. The  visual ejection fraction is 25-30%. Biplane LVEF is 25%. Diastolic Doppler  findings (E/E' ratio and/or other parameters) suggest left ventricular filling  pressures are increased. There is severe hypokinesis to akinesis of the mid  anterior/anterolateral/anteroseptal/inferoseptal walls and all apical segments  of the left ventricle. There is no thrombus seen in the left ventricle.  2. The right ventricle is normal size. The right ventricular systolic function  is normal.  3. The left atrium is moderately dilated. Right atrial size is normal. There  is no color Doppler evidence of an atrial shunt.  4. There is moderate to mod-severe (2-3+) mitral regurgitation.  5. There is mild to moderate (1-2+) tricuspid regurgitation.Right ventricular  systolic pressure is elevated, consistent with moderate pulmonary  hypertension.  6. No pericardial effusion.  7. In direct comparison to the previous study dated 10/30/2023, there has been  an interval increase in the degree of mitral regurgitation. The other findings  are similar.    12/6/2023  CMR  Clinical history: 73 year old with anterior STEMI, cardiac arrest in Oct 2023     Comparison CMR: none     1. The left ventricle is severely enlarged. There is wall thinning and akinesis of the mid-distal anterior,  mid anteroseptum, distal septum and the apex  The global systolic function is severely reduced. The LVEF is  28%.     2. The right ventricle is normal in cavity size. The global systolic function is normal. The RVEF is 52%.      3. The right atrium is normal and the left atrium is severely enlarged.     4. There is mild mitral regurgitation.      5. There is transmural late gadolinium enhancement in mid-distal anterior, mid anteroseptum, distal  sepum  and the apex consistent with a large infarction in the LAD territory.      6. There is no pericardial effusion.     7. There is no intracardiac thrombus.     8. There are bilateral pleural effusions.        CONCLUSIONS:   Ischemic cardiomyopathy with a large anteroapical infarction.   Severely reduced left ventricular function and normal right ventricular function, LVEF 28% and RVEF 52%.       10/30/2023 Echo  Interpretation Summary  Ischemic CM. Large LAD MI. Left ventricular function is decreased. The  ejection fraction is 20-30% (severely reduced).  Global right ventricular function is normal.  No significant valvular abnormalities present.  IVC diameter <2.1 cm collapsing >50% with sniff suggests a normal RA pressure  of 3 mmHg.  No pericardial effusion is present.  No significant changes noted.      Assessment and Plan:   Mr. Aguila is a 73 year old male with medical history pertinent for anterior STEMI s/p PCI to the pLAD on 10/26/23 with a VT/VF arrest the next day (10/27) with patent stents and unchanged anatomy on repeat angiogram. Placed on amiodarone and discharged 11/03/23, ICD was not indicated as this was within 48h of his MI. His EF prior to discharge was 20-30% with a large area of akinesis in the LAD placed on apixaban due to this.  He presents to CORE clinic for routine follow up.     Euvolemic by exam. BPs are low stable. No room for further up titration of GDMT. Review of labs are stable. Chronic hyponatremia, Na 133. Renal function normal. By most recent echo, EF remains poor. Will defer to Dr. Cruz about further risk stratification with right heart cath and cardiopulmonary stress test. He has already been referred to EP for consideration of ICD for SCD prevention. No medication changes today.    # Chronic systolic heart failure/HFrEF secondary to ICM (EF 20-30% by TTE 10/23)  Stage C. NYHA Class II-III.    Fluid status: euvolemic  ACEi/ARB/ARNi/afterload reduction: lisinopril 1.25 mg daily, hold for SBP < 90  BB: metoprolol succinate 12.5 mg HS, hold for SBP < 90, HR < 50  Aldosterone antagonist: deferred while other medical therapy is prioritized, unable to start d/t hypotension   SGLT2i: deferred while other medical therapy is prioritized  SCD prophylaxis: persistently reduced EF <35%, referred to EP   NSAID use: contraindicated  Sleep apnea evaluation: eval with sleep medicine 3/12/24    # CAD s/p PCI to LAD  # STEMI  # VT/VF arrest  - on plavix and apixaban (akinesis of the mid-distal anterior,mid anteroseptum, distal septum and the apex)      Follow up:  - EP with Dr. Black 4/4/24  - Dr. Cruz 4/15/24  - CORE 3 months           Optimal Vascular Metrics    Blood Pressure   BP < 140/90 Yes    On Aspirin  No: Contraindicated due to: currently on eliquis and plavix    On Statin  Yes    Tobacco use  No      Please do not hesitate to contact me if you have any questions/concerns.     Sincerely,     NADEGE Putnam CNP

## 2024-03-06 NOTE — PATIENT INSTRUCTIONS
CORE: Cardiomyopathy, Optimization, Rehabilitation, Education      Take your medicines every day, as directed    Changes/Recommendations made today:  No medication changes today     Monitor Your Weight and Symptoms    Contact us if you:    Gain 2 pounds in one day or 5 pounds in one week  Feel more short of breath  Notice more leg swelling  Feel lightheadeded   Change your lifestyle    Limit Salt or Sodium:  2000 mg  Limit Fluids:  2000 mL or approximately 64 ounces  Eat a Heart Healthy Diet  Low in saturated fats  Stay Active:  Aim to move at least 150 minutes every  week         To Contact us    During Business Hours:  [phone], option # 1      After hours, weekends or holidays:   [phone], Option #4  Ask to speak to the On-Call Cardiologist. Inform them you are a CORE/heart failure patient at the [facility].     Use [employer] allows you to communicate directly with your heart team through secure messaging.  [employer] can be accessed any time on your phone, computer, or tablet.  If you need assistance, we'd be happy to help!         Keep your Heart Appointments:    Dr. Black 4/4/24    Dr. Cruz 4/15/24    CORE in 3 months

## 2024-03-19 ENCOUNTER — INFUSION THERAPY VISIT (OUTPATIENT)
Dept: INFUSION THERAPY | Facility: CLINIC | Age: 74
End: 2024-03-19
Attending: INTERNAL MEDICINE
Payer: MEDICARE

## 2024-03-19 VITALS — SYSTOLIC BLOOD PRESSURE: 101 MMHG | DIASTOLIC BLOOD PRESSURE: 62 MMHG

## 2024-03-19 DIAGNOSIS — C61 PROSTATE CANCER (H): Primary | ICD-10-CM

## 2024-03-19 PROCEDURE — 250N000011 HC RX IP 250 OP 636: Mod: JZ | Performed by: INTERNAL MEDICINE

## 2024-03-19 PROCEDURE — 96402 CHEMO HORMON ANTINEOPL SQ/IM: CPT

## 2024-03-19 RX ADMIN — LEUPROLIDE ACETATE 22.5 MG: KIT at 14:05

## 2024-03-19 NOTE — PROGRESS NOTES
Infusion Nursing Note:  Duane C Johnson presents today for Lupron.    Patient seen by provider today: No   present during visit today: Not Applicable.    Note: [facility] handout given to pt on side effects of Lupron.      Intravenous Access:  No Intravenous access/labs at this visit.    Treatment Conditions:  Not Applicable.      Post Infusion Assessment:  Patient tolerated injection without incident.  No evidence of extravasations.  Access discontinued per protocol.       Discharge Plan:   Copy of AVS reviewed with patient and/or family.  Patient will return 6/11/24 for next appointment.  Patient discharged in stable condition accompanied by: self.  Departure Mode: Ambulatory.      GREG GARAY RN

## 2024-03-22 ENCOUNTER — TELEPHONE (OUTPATIENT)
Dept: UROLOGY | Facility: CLINIC | Age: 74
End: 2024-03-22

## 2024-03-22 ENCOUNTER — PREP FOR PROCEDURE (OUTPATIENT)
Dept: UROLOGY | Facility: CLINIC | Age: 74
End: 2024-03-22

## 2024-03-22 ENCOUNTER — VIRTUAL VISIT (OUTPATIENT)
Dept: UROLOGY | Facility: CLINIC | Age: 74
End: 2024-03-22
Payer: MEDICARE

## 2024-03-22 DIAGNOSIS — C61 PROSTATE CANCER (H): Primary | ICD-10-CM

## 2024-03-22 PROCEDURE — 99213 OFFICE O/P EST LOW 20 MIN: CPT | Mod: 95 | Performed by: UROLOGY

## 2024-03-22 NOTE — TELEPHONE ENCOUNTER
I spoke with patient to schedule surgery, spouse noted that Dr. Barros wanted patient to wait 6 weeks from today to schedule surgery because of a shot. Nothing is noted in the surgery order. Please advise if we need to wait to schedule 6 weeks. Thank you

## 2024-03-22 NOTE — TELEPHONE ENCOUNTER
----- Message from Adela Olvera RN sent at 3/22/2024 10:20 AM CDT -----  Good morning,    We have another patient we would like to connect with your team.  I talked with Duane and his wife Melissa this morning about a visit with Dr. Barros to discuss the spaceOAR and fiducial placement procedure.    He had his lupron/eligard shot on 3/19/24.  We are hoping your team could aim for the week of 4/22/24 for the same day surgery procedure.    Please let me know if I can help with anything else.    Thank you very much  Adela

## 2024-03-22 NOTE — TELEPHONE ENCOUNTER
Forwarding to surgery schedulers on date.    Kayla WARD RN   [facility], Urology   3/22/2024 3:31 PM

## 2024-03-22 NOTE — PROGRESS NOTES
Duane is a 74 year old who is being evaluated via a billable video visit.    What phone number would you like to be contacted at?    How would you like to obtain your AVS? [facility]      Assessment & Plan   Problem List Items Addressed This Visit       Prostate cancer (H) - Primary        Review of external notes as documented elsewhere in note  Review of the result(s) of each unique test - MEDICAL RECORDS/MRI  Ordering of each unique test  20 minutes spent by me on the date of the encounter doing chart review, history and exam, documentation and further activities per the note         No follow-ups on file.      Subjective   Duane is a 74 year old, presenting for the following health issues:  No chief complaint on file.    Video Start Time: 1200    HPI     Patient is a pleasant 74-year-old gentleman with history of group 3 prostate cancer plan to undergo radiation treatment.  Patient is here to discuss fiducial marker and SpaceOAR placement.      Review of Systems  Constitutional, neuro, ENT, endocrine, pulmonary, cardiac, gastrointestinal, genitourinary, musculoskeletal, integument and psychiatric systems are negative, except as otherwise noted.      Objective           Vitals:  No vitals were obtained today due to virtual visit.    Physical Exam   GENERAL: alert and no distress     RESP: No audible wheeze, cough, or visible cyanosis.       NEURO: Cranial nerves grossly intact.  Mentation and speech appropriate for age.  PSYCH: Appropriate affect, tone, and pace of words    Prostate cancer: Discussed SpaceOAR and fiducial marker placement.  Pros and cons discussed.  Will send patient information about SpaceOAR placement.  Schedule elective procedure in near future.      Video-Visit Details    Type of service:  Video Visit   Video End Time: 1210  Originating Location (pt. Location): Home    Distant Location (provider location):  On-site  Platform used for Video Visit: Telephone  Signed Electronically by: Stanislaw Lemon  MD Fiorella

## 2024-03-25 NOTE — TELEPHONE ENCOUNTER
Type of surgery: INJECTION, HYDROGEL SPACER AND FIDUCIAL MARKER PLACEMENT (N/A)   Location of surgery: [facility] OR  Date and time of surgery: 04/22/2024  Surgeon: EDGAR  Pre-Op Appt Date: 04/15/2024  Post-Op Appt Date: NONE   Packet sent out: Yes  Pre-cert/Authorization completed:  No  Date:

## 2024-03-28 DIAGNOSIS — C61 PROSTATE CANCER (H): Primary | ICD-10-CM

## 2024-04-04 ENCOUNTER — VIRTUAL VISIT (OUTPATIENT)
Dept: CARDIOLOGY | Facility: CLINIC | Age: 74
End: 2024-04-04
Attending: STUDENT IN AN ORGANIZED HEALTH CARE EDUCATION/TRAINING PROGRAM
Payer: MEDICARE

## 2024-04-04 VITALS
BODY MASS INDEX: 21.53 KG/M2 | SYSTOLIC BLOOD PRESSURE: 103 MMHG | HEART RATE: 52 BPM | HEIGHT: 66 IN | DIASTOLIC BLOOD PRESSURE: 65 MMHG | WEIGHT: 134 LBS

## 2024-04-04 DIAGNOSIS — I46.9 CARDIAC ARREST (H): ICD-10-CM

## 2024-04-04 DIAGNOSIS — I50.20 HEART FAILURE WITH REDUCED EJECTION FRACTION, NYHA CLASS I (H): ICD-10-CM

## 2024-04-04 DIAGNOSIS — I25.5 ISCHEMIC CARDIOMYOPATHY: Primary | ICD-10-CM

## 2024-04-04 DIAGNOSIS — Z95.810 ICD (IMPLANTABLE CARDIOVERTER-DEFIBRILLATOR) IN PLACE: ICD-10-CM

## 2024-04-04 PROCEDURE — 99215 OFFICE O/P EST HI 40 MIN: CPT | Mod: 95 | Performed by: INTERNAL MEDICINE

## 2024-04-04 RX ORDER — SODIUM CHLORIDE 9 MG/ML
INJECTION, SOLUTION INTRAVENOUS CONTINUOUS
Status: CANCELLED | OUTPATIENT
Start: 2024-04-04

## 2024-04-04 RX ORDER — CEFAZOLIN SODIUM 2 G/50ML
2 SOLUTION INTRAVENOUS
Status: CANCELLED | OUTPATIENT
Start: 2024-04-04

## 2024-04-04 RX ORDER — LIDOCAINE 40 MG/G
CREAM TOPICAL
Status: CANCELLED | OUTPATIENT
Start: 2024-04-04

## 2024-04-04 ASSESSMENT — PAIN SCALES - GENERAL: PAINLEVEL: NO PAIN (0)

## 2024-04-04 NOTE — PATIENT INSTRUCTIONS
You were seen in the Electrophysiology Clinic today by: Dr Black    Plan:     ICD implant- procedure  to contact patient  Follow up 3 months afterwards     If you have further questions, please utilize [employer] to contact us.     Your Care Team:    EP Cardiology   Telephone Number     Nurse Line  Perla Ralph, RN   Dina Lynch, RN  Deric Guerra, BLADE   [phone]     For scheduling appointments:   Jay   [phone]   For procedure scheduling:    Felisha Nicolas     [phone]   For the Device Clinic (Pacemakers, ICDs, Loop Recorders)    During business hours: [phone]  After business hours:   [phone]- select option 4 and ask for job code [id number].       On-call cardiologist for after hours or on weekends:   [phone], option #4, and ask to speak to the on-call cardiologist.     Cardiovascular Clinic:   [address] [place] [address]. [place] [address]      As always, Thank you for trusting us with your health care needs!

## 2024-04-04 NOTE — NURSING NOTE
Is the patient currently in the state of [place]? YES    Visit mode:VIDEO    If the visit is dropped, the patient can be reconnected by: VIDEO VISIT: Text to cell phone:   Telephone Information:   Mobile [phone]       Will anyone else be joining the visit? Pts spouse  (If patient encounters technical issues they should call [phone])    How would you like to obtain your AVS? MyChart    Are changes needed to the allergy or medication list? No    Patient denies any changes since echeck-in completion and states all information entered during echeck-in remains accurate.    Are refills needed on medications prescribed by this physician? NA    Reason for visit: Consult    PATTI BolesF

## 2024-04-04 NOTE — LETTER
4/4/2024      RE: Duane C Johnson  [address] [address] [place] [id number]       Dear Colleague,    Thank you for the opportunity to participate in the care of your patient, Duane C Johnson, at the [facility] HEART [facility]. Please see a copy of my visit note below.    Virtual Visit Details    Type of service:  Video Visit     HPI:   Mr. Aguila is a 74-year-old male who is previously seen by the cardiology consult service at [facility] in October 2023.  At that time he had undergone coronary revascularization procedure and sustained a cardiac arrest with documented rapid ventricular tachycardia.  Resuscitation was successful.  Subsequent evaluation called for his reassessing left ventricular function after several months in order to ascertain whether he was a candidate for ICD.    Patient did undergo echocardiography in February which showed persistent ejection fraction lowering of 25 to 30%..  He is being treated with beta-blocker and ACE inhibitor along with diuretics and amiodarone.    Patient has no current cardiac complaints.  He has not been experiencing any exertional chest pain or shortness of breath or palpitations.  He is being treated for prostate cancer and will be undergoing radiation in a month or so.  I indicated that this should not be a contraindication to proceeding with ICD implant.    At today's visit Mr. Aguila indicated that he had been doing a lot of reading regarding ICDs and was familiar with the device and the procedure.  We did discuss the nature of the procedure including risks of bleeding, infection, pulmonary injury, and even death.  Patient was in the clinic with his spouse and they both agreed that given his low ejection fraction they would like to proceed with ICD implant.  We did discuss whether he had a preference and he indicated that it was not strong but he would prefer a [employer] device.   We will arrange that.    I indicated to  and Mrs. Aguila that they would receive a call from scheduling.  They voiced understanding and agreement.        From Discharge summary 10/23  Brief HPI:  Duane C Johnson is a 73 year old male with a history of HTN, HLD, and tobacco use presented to ED 10/26 with acute chest pain and EKG demonstrating anterior STEMI for which he underwent coronary angigoram and had PCI with BRENDA to pLAD. His course is complicated by subsequent VF/VT cardiac arrest x2 on 10/27/23 for which repeat angio performed revealing patent stents. Patient has now been stable on PO Amio        PAST MEDICAL HISTORY:  Past Medical History:   Diagnosis Date     Benign essential hypertension      CAD (coronary artery disease)      Chronic systolic heart failure (H)      Hypertension      ST elevation myocardial infarction (STEMI), unspecified artery (H)      Ventricular fibrillation (H)        CURRENT MEDICATIONS:  Current Outpatient Medications   Medication Sig Dispense Refill     acetaminophen (TYLENOL) 325 MG tablet Take 650 mg by mouth every 6 hours as needed (Patient not taking: Reported on 3/22/2024)       amiodarone (PACERONE) 200 MG tablet Take 1 tablet (200 mg) by mouth daily 90 tablet 3     apixaban ANTICOAGULANT (ELIQUIS) 5 MG tablet Take 1 tablet (5 mg) by mouth 2 times daily 180 tablet 3     atorvastatin (LIPITOR) 80 MG tablet Take 1 tablet (80 mg) by mouth daily 90 tablet 3     bumetanide (BUMEX) 1 MG tablet Take 1 tablet (1 mg) by mouth daily 30 tablet 3     cetirizine (ZYRTEC) 10 MG tablet Take 10 mg by mouth daily       clopidogrel (PLAVIX) 75 MG tablet Take 1 tablet (75 mg) by mouth daily 30 tablet 3     escitalopram (LEXAPRO) 10 MG tablet Take 10 mg by mouth daily       fish oil-omega-3 fatty acids 1000 MG capsule Take 1 g by mouth 2 times daily Also contains another supplement       hydrOXYzine HCl (ATARAX) 25 MG tablet Take 1 tablet (25 mg) by mouth every 8 hours as needed for anxiety  (Patient not taking: Reported on 2/2/2024) 30 tablet 0     lisinopril (ZESTRIL) 2.5 MG tablet Take 0.5 tablets (1.25 mg) by mouth daily HOLD for systolic blood pressure < 90 45 tablet 1     magnesium oxide (MAG-OX) 400 MG tablet Take 1 tablet (400 mg) by mouth daily 90 tablet 3     melatonin 5 MG tablet Take 1-2 tablets (5-10 mg) by mouth at bedtime 60 tablet 0     metoprolol succinate ER (TOPROL XL) 25 MG 24 hr tablet Take 0.5 tablets (12.5 mg) by mouth daily Hold for systolic blood pressure less than 90. 45 tablet 1     multivitamin w/minerals (THERA-VIT-M) tablet Take 1 tablet by mouth daily       potassium chloride ER (KLOR-CON M) 20 MEQ CR tablet Take 1 tablet (20 mEq) by mouth daily 30 tablet 5     vitamin C (ASCORBIC ACID) 1000 MG TABS Take 1,000 mg by mouth daily         PAST SURGICAL HISTORY:  Past Surgical History:   Procedure Laterality Date     COLONOSCOPY N/A 3/23/2023    Procedure: COLONOSCOPY, FLEXIBLE, WITH LESION REMOVAL USING SNARE;  Surgeon: Jose Faustin MD;  Location: [facility]     CV CENTRAL VENOUS CATHETER PLACEMENT N/A 10/26/2023    Procedure: Central Venous Catheter Placement;  Surgeon: Rob Lyles MD;  Location: [facility] CARDIAC CATH LAB     CV CORONARY ANGIOGRAM N/A 10/27/2023    Procedure: Coronary Angiogram;  Surgeon: Rob Lyles MD;  Location: [facility] CARDIAC CATH LAB     CV CORONARY ANGIOGRAM N/A 10/26/2023    Procedure: Coronary Angiogram;  Surgeon: Rob Lyles MD;  Location: [facility] CARDIAC CATH LAB     CV LEFT HEART CATH N/A 10/26/2023    Procedure: Left Heart Catheterization;  Surgeon: Rob Lyles MD;  Location: [facility] CARDIAC CATH LAB     CV PCI N/A 10/27/2023    Procedure: Percutaneous Coronary Intervention;  Surgeon: Rob Lyles MD;  Location: [facility] CARDIAC CATH LAB     CV PCI STENT DRUG ELUTING N/A 10/26/2023    Procedure: Percutaneous Coronary Intervention Stent;  Surgeon: Rob Lyles MD;  Location:  [facility] CARD[facility]       ALLERGIES:     Allergies   Allergen Reactions     Brilinta [Ticagrelor] Other (See Comments) and Difficulty breathing     Per pt and spouse, hyperventilation.     Clonazepam Other (See Comments)     Per spouse, acted like a zombie and he was shaky, could barely talk.       FAMILY HISTORY:  Family History   Problem Relation Age of Onset     Other Cancer Mother      Obesity Mother      GI problems Father      Uterine Cancer Sister      SOCIAL HISTORY:  Social History     Tobacco Use     Smoking status: Former     Packs/day: 0.25     Years: 30.00     Additional pack years: 0.00     Total pack years: 7.50     Types: Cigarettes     Quit date: 10/26/2023     Years since quittin.4     Passive exposure: Current     Smokeless tobacco: Never     Tobacco comments:     Quit 10/26/2023   Vaping Use     Vaping Use: Never used   Substance Use Topics     Alcohol use: Yes     Comment: 1-2 beers per day     Drug use: No       ROS:   Constitutional: No fever, chills, or sweats. Weight stable.   ENT: No visual disturbance,    Cardiovascular: As per HPI.   Respiratory: No cough, hemoptysis.    GI: No nausea, vomiting,    : No hematuria.   Integument: Negative.   Psychiatric: Negative.   Hematologic:   no easy bleeding.  Neuro: Negative.   Endocrinology: No significant heat or cold intolerance   Musculoskeletal: No myalgia.    Exam:  /65   Pulse 52   Ht 1.676 m (5' 6\")   Wt 60.8 kg (134 lb)   BMI 21.63 kg/m    GENERAL APPEARANCE: healthy, alert and no distress  HEENT: no icterus,  no central cyanosis  NECK: , JVP not elevated  RESPIRATORY:  no rales, rhonchi or wheezes, no use of accessory muscles, no retractions, respirations are unlabored, normal respiratory rate  NEURO: alert and oriented to person/place/time, normal speech, gait and affect  SKIN: no ecchymoses, no rashes    Labs:  CBC RESULTS:   Lab Results   Component Value Date    WBC 5.3 2024    WBC 12.2 (H) 2021    RBC " 4.05 (L) 02/07/2024    RBC 3.60 (L) 05/26/2021    HGB 12.4 (L) 02/07/2024    HGB 12.0 (L) 05/26/2021    HCT 37.1 (L) 02/07/2024    HCT 34.8 (L) 05/26/2021    MCV 92 02/07/2024    MCV 97 05/26/2021    MCH 30.6 02/07/2024    MCH 33.3 (H) 05/26/2021    MCHC 33.4 02/07/2024    MCHC 34.5 05/26/2021    RDW 14.4 02/07/2024    RDW 13.2 05/26/2021     02/07/2024     05/26/2021       BMP RESULTS:  Lab Results   Component Value Date     (L) 03/06/2024     10/27/2023     05/26/2021    POTASSIUM 3.9 03/06/2024    POTASSIUM 4.1 10/27/2023    POTASSIUM 4.5 05/26/2021    CHLORIDE 95 (L) 03/06/2024    CHLORIDE 103 10/27/2023    CHLORIDE 105 05/26/2021    CO2 30 (H) 03/06/2024    CO2 27 10/27/2023    CO2 29 05/26/2021    ANIONGAP 8 03/06/2024    ANIONGAP 7 05/26/2021     (H) 03/06/2024     (H) 11/01/2023     (H) 05/26/2021    BUN 25.0 (H) 03/06/2024    BUN 41 (H) 05/26/2021    CR 1.01 03/06/2024    CR 0.80 05/26/2021    GFRESTIMATED 78 03/06/2024    GFRESTIMATED >60 10/26/2023    GFRESTIMATED 90 05/26/2021    GFRESTBLACK >90 05/26/2021    PRANAV 9.2 03/06/2024    PRANAV 8.3 (L) 05/26/2021        INR RESULTS:  Lab Results   Component Value Date    INR 1.44 (H) 12/27/2023    INR 1.39 (H) 11/07/2023    INR 1.23 (H) 10/27/2023    INR 1.10 10/26/2023    INR 1.15 (H) 05/26/2021       Procedures:  PULMONARY FUNCTION TESTS:        No data to display                  ECHOCARDIOGRAM:   No results found for this or any previous visit (from the past 8760 hour(s)).      Assessment and Plan:   1.  Coronary artery disease status postcardiac arrest after PCI.  2.  Persistent low ejection fraction (25-30%)  3.  Clinically stable from symptomatic perspective    Plan  1.  Schedule patient for dual-chamber ICD ([employer]) in the next couple weeks.  Patient will have to hold Eliquis 2 days before procedure.  He is also taking Plavix but we will not be able to hold that adequately.  2.  Arrange for  routine ICD follow-up  3.  Clinic follow-up video 6 months    Total elapsed time today with chart review, clinic visit and documentation 60 minutes    Video on 11: 30 AM off 12: 05  Platform Doximity  Patient at home; clinic [facility] heart    I very much appreciated the opportunity to see and assess Duane C Johnson in the clinic today. Please do not hesitate to contact my office if you have any questions or concerns.      Guero Black MD  Cardiac Arrhythmia Service  [facility]  [phone]       CC  ANIYAH CARBONE

## 2024-04-04 NOTE — PROGRESS NOTES
Virtual Visit Details    Type of service:  Video Visit     HPI:   Mr. Aguila is a 74-year-old male who is previously seen by the cardiology consult service at [facility] in October 2023.  At that time he had undergone coronary revascularization procedure and sustained a cardiac arrest with documented rapid ventricular tachycardia.  Resuscitation was successful.  Subsequent evaluation called for his reassessing left ventricular function after several months in order to ascertain whether he was a candidate for ICD.    Patient did undergo echocardiography in February which showed persistent ejection fraction lowering of 25 to 30%..  He is being treated with beta-blocker and ACE inhibitor along with diuretics and amiodarone.    Patient has no current cardiac complaints.  He has not been experiencing any exertional chest pain or shortness of breath or palpitations.  He is being treated for prostate cancer and will be undergoing radiation in a month or so.  I indicated that this should not be a contraindication to proceeding with ICD implant.    At today's visit Mr. Aguila indicated that he had been doing a lot of reading regarding ICDs and was familiar with the device and the procedure.  We did discuss the nature of the procedure including risks of bleeding, infection, pulmonary injury, and even death.  Patient was in the clinic with his spouse and they both agreed that given his low ejection fraction they would like to proceed with ICD implant.  We did discuss whether he had a preference and he indicated that it was not strong but he would prefer a [employer] device.  We will arrange that.    I indicated to  and Mrs. Aguila that they would receive a call from scheduling.  They voiced understanding and agreement.        From Discharge summary 10/23  Brief HPI:  Duane C Johnson is a 73 year old male with a history of HTN, HLD, and tobacco use presented to ED 10/26 with acute chest pain and EKG  demonstrating anterior STEMI for which he underwent coronary angigoram and had PCI with BRENDA to pLAD. His course is complicated by subsequent VF/VT cardiac arrest x2 on 10/27/23 for which repeat angio performed revealing patent stents. Patient has now been stable on PO Amio        PAST MEDICAL HISTORY:  Past Medical History:   Diagnosis Date    Benign essential hypertension     CAD (coronary artery disease)     Chronic systolic heart failure (H)     Hypertension     ST elevation myocardial infarction (STEMI), unspecified artery (H)     Ventricular fibrillation (H)        CURRENT MEDICATIONS:  Current Outpatient Medications   Medication Sig Dispense Refill    acetaminophen (TYLENOL) 325 MG tablet Take 650 mg by mouth every 6 hours as needed (Patient not taking: Reported on 3/22/2024)      amiodarone (PACERONE) 200 MG tablet Take 1 tablet (200 mg) by mouth daily 90 tablet 3    apixaban ANTICOAGULANT (ELIQUIS) 5 MG tablet Take 1 tablet (5 mg) by mouth 2 times daily 180 tablet 3    atorvastatin (LIPITOR) 80 MG tablet Take 1 tablet (80 mg) by mouth daily 90 tablet 3    bumetanide (BUMEX) 1 MG tablet Take 1 tablet (1 mg) by mouth daily 30 tablet 3    cetirizine (ZYRTEC) 10 MG tablet Take 10 mg by mouth daily      clopidogrel (PLAVIX) 75 MG tablet Take 1 tablet (75 mg) by mouth daily 30 tablet 3    escitalopram (LEXAPRO) 10 MG tablet Take 10 mg by mouth daily      fish oil-omega-3 fatty acids 1000 MG capsule Take 1 g by mouth 2 times daily Also contains another supplement      hydrOXYzine HCl (ATARAX) 25 MG tablet Take 1 tablet (25 mg) by mouth every 8 hours as needed for anxiety (Patient not taking: Reported on 2/2/2024) 30 tablet 0    lisinopril (ZESTRIL) 2.5 MG tablet Take 0.5 tablets (1.25 mg) by mouth daily HOLD for systolic blood pressure < 90 45 tablet 1    magnesium oxide (MAG-OX) 400 MG tablet Take 1 tablet (400 mg) by mouth daily 90 tablet 3    melatonin 5 MG tablet Take 1-2 tablets (5-10 mg) by mouth at bedtime  60 tablet 0    metoprolol succinate ER (TOPROL XL) 25 MG 24 hr tablet Take 0.5 tablets (12.5 mg) by mouth daily Hold for systolic blood pressure less than 90. 45 tablet 1    multivitamin w/minerals (THERA-VIT-M) tablet Take 1 tablet by mouth daily      potassium chloride ER (KLOR-CON M) 20 MEQ CR tablet Take 1 tablet (20 mEq) by mouth daily 30 tablet 5    vitamin C (ASCORBIC ACID) 1000 MG TABS Take 1,000 mg by mouth daily         PAST SURGICAL HISTORY:  Past Surgical History:   Procedure Laterality Date    COLONOSCOPY N/A 3/23/2023    Procedure: COLONOSCOPY, FLEXIBLE, WITH LESION REMOVAL USING SNARE;  Surgeon: Jose Faustin MD;  Location: [facility]    CV CENTRAL VENOUS CATHETER PLACEMENT N/A 10/26/2023    Procedure: Central Venous Catheter Placement;  Surgeon: Rob Lyles MD;  Location: [facility] CARDIAC CATH LAB    CV CORONARY ANGIOGRAM N/A 10/27/2023    Procedure: Coronary Angiogram;  Surgeon: Rob Lyles MD;  Location: [facility] CARDIAC CATH LAB    CV CORONARY ANGIOGRAM N/A 10/26/2023    Procedure: Coronary Angiogram;  Surgeon: Rob Lyles MD;  Location: [facility] CARDIAC CATH LAB    CV LEFT HEART CATH N/A 10/26/2023    Procedure: Left Heart Catheterization;  Surgeon: Rob Lyles MD;  Location: [facility] CARDIAC CATH LAB    CV PCI N/A 10/27/2023    Procedure: Percutaneous Coronary Intervention;  Surgeon: Rob Lyles MD;  Location: [facility] CARDIAC CATH LAB    CV PCI STENT DRUG ELUTING N/A 10/26/2023    Procedure: Percutaneous Coronary Intervention Stent;  Surgeon: Rob Lyles MD;  Location: [facility] CARDIAC CATH LAB       ALLERGIES:     Allergies   Allergen Reactions    Brilinta [Ticagrelor] Other (See Comments) and Difficulty breathing     Per pt and spouse, hyperventilation.    Clonazepam Other (See Comments)     Per spouse, acted like a zombie and he was shaky, could barely talk.       FAMILY HISTORY:  Family History   Problem Relation Age of  "Onset    Other Cancer Mother     Obesity Mother     GI problems Father     Uterine Cancer Sister      SOCIAL HISTORY:  Social History     Tobacco Use    Smoking status: Former     Packs/day: 0.25     Years: 30.00     Additional pack years: 0.00     Total pack years: 7.50     Types: Cigarettes     Quit date: 10/26/2023     Years since quittin.4     Passive exposure: Current    Smokeless tobacco: Never    Tobacco comments:     Quit 10/26/2023   Vaping Use    Vaping Use: Never used   Substance Use Topics    Alcohol use: Yes     Comment: 1-2 beers per day    Drug use: No       ROS:   Constitutional: No fever, chills, or sweats. Weight stable.   ENT: No visual disturbance,    Cardiovascular: As per HPI.   Respiratory: No cough, hemoptysis.    GI: No nausea, vomiting,    : No hematuria.   Integument: Negative.   Psychiatric: Negative.   Hematologic:   no easy bleeding.  Neuro: Negative.   Endocrinology: No significant heat or cold intolerance   Musculoskeletal: No myalgia.    Exam:  /65   Pulse 52   Ht 1.676 m (5' 6\")   Wt 60.8 kg (134 lb)   BMI 21.63 kg/m    GENERAL APPEARANCE: healthy, alert and no distress  HEENT: no icterus,  no central cyanosis  NECK: , JVP not elevated  RESPIRATORY:  no rales, rhonchi or wheezes, no use of accessory muscles, no retractions, respirations are unlabored, normal respiratory rate  NEURO: alert and oriented to person/place/time, normal speech, gait and affect  SKIN: no ecchymoses, no rashes    Labs:  CBC RESULTS:   Lab Results   Component Value Date    WBC 5.3 2024    WBC 12.2 (H) 2021    RBC 4.05 (L) 2024    RBC 3.60 (L) 2021    HGB 12.4 (L) 2024    HGB 12.0 (L) 2021    HCT 37.1 (L) 2024    HCT 34.8 (L) 2021    MCV 92 2024    MCV 97 2021    MCH 30.6 2024    MCH 33.3 (H) 2021    MCHC 33.4 2024    MCHC 34.5 2021    RDW 14.4 2024    RDW 13.2 2021     2024     " 05/26/2021       BMP RESULTS:  Lab Results   Component Value Date     (L) 03/06/2024     10/27/2023     05/26/2021    POTASSIUM 3.9 03/06/2024    POTASSIUM 4.1 10/27/2023    POTASSIUM 4.5 05/26/2021    CHLORIDE 95 (L) 03/06/2024    CHLORIDE 103 10/27/2023    CHLORIDE 105 05/26/2021    CO2 30 (H) 03/06/2024    CO2 27 10/27/2023    CO2 29 05/26/2021    ANIONGAP 8 03/06/2024    ANIONGAP 7 05/26/2021     (H) 03/06/2024     (H) 11/01/2023     (H) 05/26/2021    BUN 25.0 (H) 03/06/2024    BUN 41 (H) 05/26/2021    CR 1.01 03/06/2024    CR 0.80 05/26/2021    GFRESTIMATED 78 03/06/2024    GFRESTIMATED >60 10/26/2023    GFRESTIMATED 90 05/26/2021    GFRESTBLACK >90 05/26/2021    PRANAV 9.2 03/06/2024    PRANAV 8.3 (L) 05/26/2021        INR RESULTS:  Lab Results   Component Value Date    INR 1.44 (H) 12/27/2023    INR 1.39 (H) 11/07/2023    INR 1.23 (H) 10/27/2023    INR 1.10 10/26/2023    INR 1.15 (H) 05/26/2021       Procedures:  PULMONARY FUNCTION TESTS:        No data to display                  ECHOCARDIOGRAM:   No results found for this or any previous visit (from the past 8760 hour(s)).      Assessment and Plan:   1.  Coronary artery disease status postcardiac arrest after PCI.  2.  Persistent low ejection fraction (25-30%)  3.  Clinically stable from symptomatic perspective    Plan  1.  Schedule patient for dual-chamber ICD ([employer]) in the next couple weeks.  Patient will have to hold Eliquis 2 days before procedure.  He is also taking Plavix but we will not be able to hold that adequately.  2.  Arrange for routine ICD follow-up  3.  Clinic follow-up video 6 months    Total elapsed time today with chart review, clinic visit and documentation 60 minutes    Video on 11: 30 AM off 12: 05  Platform Doximity  Patient at home; clinic [facility] heart    I very much appreciated the opportunity to see and assess Duane C Johnson in the clinic today. Please do not hesitate to contact my  office if you have any questions or concerns.      Guero Black MD  Cardiac Arrhythmia Service  [facility]  [phone]       CC  ANIYAH CARBONE

## 2024-04-08 DIAGNOSIS — I21.02 ST ELEVATION MYOCARDIAL INFARCTION INVOLVING LEFT ANTERIOR DESCENDING (LAD) CORONARY ARTERY (H): ICD-10-CM

## 2024-04-09 RX ORDER — CLOPIDOGREL BISULFATE 75 MG/1
75 TABLET ORAL DAILY
Qty: 30 TABLET | Refills: 3 | Status: SHIPPED | OUTPATIENT
Start: 2024-04-09 | End: 2024-08-06

## 2024-04-09 NOTE — PROGRESS NOTES
Hello, Duane has an upcoming appointment with us. He doesn't have any orders. Please advise.  Thank you,  [facility]

## 2024-04-10 DIAGNOSIS — I50.20 HEART FAILURE WITH REDUCED EJECTION FRACTION, NYHA CLASS I (H): Primary | ICD-10-CM

## 2024-04-12 ENCOUNTER — LAB (OUTPATIENT)
Dept: LAB | Facility: CLINIC | Age: 74
End: 2024-04-12
Payer: MEDICARE

## 2024-04-12 DIAGNOSIS — I50.20 HEART FAILURE WITH REDUCED EJECTION FRACTION, NYHA CLASS I (H): ICD-10-CM

## 2024-04-12 LAB
ANION GAP SERPL CALCULATED.3IONS-SCNC: 10 MMOL/L (ref 7–15)
BUN SERPL-MCNC: 33.1 MG/DL (ref 8–23)
CALCIUM SERPL-MCNC: 9.3 MG/DL (ref 8.8–10.2)
CHLORIDE SERPL-SCNC: 96 MMOL/L (ref 98–107)
CREAT SERPL-MCNC: 1.05 MG/DL (ref 0.67–1.17)
DEPRECATED HCO3 PLAS-SCNC: 30 MMOL/L (ref 22–29)
EGFRCR SERPLBLD CKD-EPI 2021: 74 ML/MIN/1.73M2
GLUCOSE SERPL-MCNC: 90 MG/DL (ref 70–99)
MAGNESIUM SERPL-MCNC: 2 MG/DL (ref 1.7–2.3)
NT-PROBNP SERPL-MCNC: 2606 PG/ML (ref 0–900)
POTASSIUM SERPL-SCNC: 3.9 MMOL/L (ref 3.4–5.3)
SODIUM SERPL-SCNC: 136 MMOL/L (ref 135–145)

## 2024-04-12 PROCEDURE — 83880 ASSAY OF NATRIURETIC PEPTIDE: CPT | Performed by: INTERNAL MEDICINE

## 2024-04-12 PROCEDURE — 80048 BASIC METABOLIC PNL TOTAL CA: CPT | Performed by: INTERNAL MEDICINE

## 2024-04-12 PROCEDURE — 36415 COLL VENOUS BLD VENIPUNCTURE: CPT | Performed by: INTERNAL MEDICINE

## 2024-04-12 PROCEDURE — 83735 ASSAY OF MAGNESIUM: CPT | Performed by: INTERNAL MEDICINE

## 2024-04-15 ENCOUNTER — OFFICE VISIT (OUTPATIENT)
Dept: CARDIOLOGY | Facility: CLINIC | Age: 74
End: 2024-04-15
Attending: INTERNAL MEDICINE
Payer: MEDICARE

## 2024-04-15 ENCOUNTER — OFFICE VISIT (OUTPATIENT)
Dept: FAMILY MEDICINE | Facility: CLINIC | Age: 74
End: 2024-04-15
Payer: MEDICARE

## 2024-04-15 VITALS
OXYGEN SATURATION: 96 % | HEART RATE: 54 BPM | DIASTOLIC BLOOD PRESSURE: 66 MMHG | WEIGHT: 137 LBS | BODY MASS INDEX: 22.11 KG/M2 | SYSTOLIC BLOOD PRESSURE: 107 MMHG

## 2024-04-15 VITALS
OXYGEN SATURATION: 97 % | HEART RATE: 48 BPM | RESPIRATION RATE: 14 BRPM | WEIGHT: 138 LBS | BODY MASS INDEX: 22.18 KG/M2 | DIASTOLIC BLOOD PRESSURE: 52 MMHG | SYSTOLIC BLOOD PRESSURE: 88 MMHG | HEIGHT: 66 IN | TEMPERATURE: 97.5 F

## 2024-04-15 DIAGNOSIS — E78.2 MIXED HYPERLIPIDEMIA: ICD-10-CM

## 2024-04-15 DIAGNOSIS — I25.119 ATHEROSCLEROSIS OF NATIVE CORONARY ARTERY OF NATIVE HEART WITH ANGINA PECTORIS (H): ICD-10-CM

## 2024-04-15 DIAGNOSIS — Z79.01 ANTICOAGULATED: ICD-10-CM

## 2024-04-15 DIAGNOSIS — I25.5 ISCHEMIC CARDIOMYOPATHY: ICD-10-CM

## 2024-04-15 DIAGNOSIS — I25.10 CORONARY ARTERY DISEASE INVOLVING NATIVE CORONARY ARTERY OF NATIVE HEART WITHOUT ANGINA PECTORIS: ICD-10-CM

## 2024-04-15 DIAGNOSIS — I95.9 HYPOTENSION, UNSPECIFIED HYPOTENSION TYPE: ICD-10-CM

## 2024-04-15 DIAGNOSIS — I50.42 CHRONIC COMBINED SYSTOLIC AND DIASTOLIC HEART FAILURE (H): Primary | ICD-10-CM

## 2024-04-15 DIAGNOSIS — I50.22 CHRONIC SYSTOLIC CONGESTIVE HEART FAILURE (H): ICD-10-CM

## 2024-04-15 DIAGNOSIS — I51.89 OTHER ILL-DEFINED HEART DISEASES: ICD-10-CM

## 2024-04-15 DIAGNOSIS — Z01.818 PRE-OP EXAM: Primary | ICD-10-CM

## 2024-04-15 DIAGNOSIS — C61 PROSTATE CANCER (H): ICD-10-CM

## 2024-04-15 PROBLEM — I21.3 ST ELEVATION MI (STEMI) (H): Status: RESOLVED | Noted: 2023-10-26 | Resolved: 2024-04-15

## 2024-04-15 PROBLEM — I50.9 CONGESTIVE HEART FAILURE, UNSPECIFIED HF CHRONICITY, UNSPECIFIED HEART FAILURE TYPE (H): Status: RESOLVED | Noted: 2023-11-13 | Resolved: 2024-04-15

## 2024-04-15 PROBLEM — I50.9 ACUTE DECOMPENSATED HEART FAILURE (H): Status: RESOLVED | Noted: 2023-12-25 | Resolved: 2024-04-15

## 2024-04-15 PROBLEM — J18.9 MULTIFOCAL PNEUMONIA: Status: RESOLVED | Noted: 2023-11-07 | Resolved: 2024-04-15

## 2024-04-15 PROBLEM — I50.9 ACUTE ON CHRONIC CONGESTIVE HEART FAILURE, UNSPECIFIED HEART FAILURE TYPE (H): Status: RESOLVED | Noted: 2023-12-25 | Resolved: 2024-04-15

## 2024-04-15 PROBLEM — I10 PRIMARY HYPERTENSION: Status: RESOLVED | Noted: 2023-11-07 | Resolved: 2024-04-15

## 2024-04-15 PROBLEM — R79.89 ELEVATED BRAIN NATRIURETIC PEPTIDE (BNP) LEVEL: Status: RESOLVED | Noted: 2023-11-07 | Resolved: 2024-04-15

## 2024-04-15 PROBLEM — I25.84 CORONARY ARTERY DISEASE DUE TO CALCIFIED CORONARY LESION: Status: RESOLVED | Noted: 2023-11-07 | Resolved: 2024-04-15

## 2024-04-15 PROBLEM — J18.9 PNEUMONIA OF LEFT UPPER LOBE DUE TO INFECTIOUS ORGANISM: Status: RESOLVED | Noted: 2023-11-07 | Resolved: 2024-04-15

## 2024-04-15 PROBLEM — Z86.74 HX OF CARDIAC ARREST: Status: ACTIVE | Noted: 2023-10-27

## 2024-04-15 PROBLEM — R06.02 SOB (SHORTNESS OF BREATH): Status: RESOLVED | Noted: 2023-11-07 | Resolved: 2024-04-15

## 2024-04-15 PROBLEM — I21.02 ST ELEVATION MYOCARDIAL INFARCTION INVOLVING LEFT ANTERIOR DESCENDING (LAD) CORONARY ARTERY (H): Status: RESOLVED | Noted: 2023-11-13 | Resolved: 2024-04-15

## 2024-04-15 PROBLEM — Z86.74 HX OF CARDIAC ARREST: Status: RESOLVED | Noted: 2023-10-27 | Resolved: 2024-04-15

## 2024-04-15 PROCEDURE — 99215 OFFICE O/P EST HI 40 MIN: CPT | Mod: 25 | Performed by: INTERNAL MEDICINE

## 2024-04-15 PROCEDURE — G0463 HOSPITAL OUTPT CLINIC VISIT: HCPCS | Performed by: INTERNAL MEDICINE

## 2024-04-15 PROCEDURE — 99214 OFFICE O/P EST MOD 30 MIN: CPT | Performed by: FAMILY MEDICINE

## 2024-04-15 RX ORDER — RESPIRATORY SYNCYTIAL VIRUS VACCINE 120MCG/0.5
0.5 KIT INTRAMUSCULAR ONCE
Qty: 1 EACH | Refills: 0 | Status: CANCELLED | OUTPATIENT
Start: 2024-04-15 | End: 2024-04-15

## 2024-04-15 RX ORDER — LIDOCAINE 40 MG/G
CREAM TOPICAL
Status: CANCELLED | OUTPATIENT
Start: 2024-04-15

## 2024-04-15 ASSESSMENT — PAIN SCALES - GENERAL
PAINLEVEL: NO PAIN (0)
PAINLEVEL: NO PAIN (0)

## 2024-04-15 NOTE — PROGRESS NOTES
"[employer] [facility] Metrics    Blood Pressure   {BP < 140/90:0591505::\"BP < 140/90 Yes\"}    On Aspirin  {On Aspirin Yes/No:7757008::\"Yes\"}    On Statin  {On Statin Yes/No:1832069::\"Yes\"}    Tobacco use  {Tobacco use Yes/No:9413324::\"No\"},  "

## 2024-04-15 NOTE — NURSING NOTE
"Chief Complaint   Patient presents with    Pre-Op Exam       Initial BP (!) 88/52   Pulse (!) 48   Temp 97.5  F (36.4  C) (Tympanic)   Resp 14   Ht 1.676 m (5' 6\")   Wt 62.6 kg (138 lb)   SpO2 97%   BMI 22.27 kg/m   Estimated body mass index is 22.27 kg/m  as calculated from the following:    Height as of this encounter: 1.676 m (5' 6\").    Weight as of this encounter: 62.6 kg (138 lb).    Patient presents to the clinic using No DME    Is there anyone who you would like to be able to receive your results? No  If yes have patient fill out ANT      "

## 2024-04-15 NOTE — PROGRESS NOTES
[facility]  Advanced Heart Failure Clinic    Patient Name: Duane C Johnson   : 1950   Date of Visit: 04/15/2024    Primary Care Physician: Jose Coles MD     Referring Physician: NADEGE Grier, CNP  Reason for Visit: ischemic cardiomyopathy    Dear Sirisha LIGHT, and Dr. Coles,     I had the pleasure of seeing Duane C Johnson today in the [facility]. As you know Duane C Johnson is a pleasant 74 year old year old male, who presents today for further evaluation of ischemic cardiomyopathy.    Mr. Aguila is a 73 year old male with medical history pertinent for anterior STEMI s/p PCI to the pLAD on 10/26/23 with a VT/VF arrest the next day (10/27) with patent stents and unchanged anatomy on repeat angiogram. Placed on amiodarone and discharged 23, ICD was not indicated as this was within 48h of his MI. His EF prior do discharge was 20-30% with a large area of akinesis in the LAD placed on apixaban due to this. He has since been seen in critical care clinic and CORE clinic and established HF care with me in 2024.    Mr. Aguila was readmitted -23 with ADHF and treated with IV lasix. He had some lightheadedness and his metoprolol dose was adjusted. Brillinta changed to the plavix due to SOB. Seen in the ED on 23 for hypotension with BPs 80s/60s. Holding lasix, metoprolol, and losartan. Of note, he was asymptomatic with hypotension. He was seen in the ED on  for chest pain with unremarkable work-up,  admitted - for shortness of breath with bilateral pleural effusions s/p thoracentesis and ADHF exacerbation. He returned to the ED on  with SOB and was IV diuresed with bumex and his outpatient regimen was switched to PO bumex. He was instructed to hold losartan and metoprolol for hypotension.      He was last seen in CORE clinic in March when no changes were made due to low BP. Since December, no ER visits or  hospitalization for HF.     Today he reports feeling well. He completed cardiac rehab. He has a large property and goes for walks - walking from the post [place] to his house is 0.25 miles and if walking up a path that  is uphill - he was getting short of breath but can do so on a different path that is flatter. He uses a stair lift at home, and does not take stairs, as it makes him out of breath. He gets lightheaded if he gets up quickly and this resolves quickly. He holds medications for low BP but is unable to tell me more - his wife manages them for him. He tells me he ends up holding medications many days.    He denies chest pain, PND/orthopnea, palpitations, lightheadedness, syncope, leg swelling.      Wife closely monitors sodium and fluid intake. They have been adhering to a 1.5 L FR and 1500 mg sodium restriction.      Home Bps -  / 50-60s, HR 50-65  Weight 138-140 lb      ROS:  A complete 10-point ROS was negative except as above.    PAST MEDICAL HISTORY:  Past Medical History:   Diagnosis Date    Benign essential hypertension     CAD (coronary artery disease)     Chronic systolic heart failure (H)     Hypertension     ST elevation myocardial infarction (STEMI), unspecified artery (H)     Ventricular fibrillation (H)        PAST SURGICAL HISTORY:  Past Surgical History:   Procedure Laterality Date    COLONOSCOPY N/A 3/23/2023    Procedure: COLONOSCOPY, FLEXIBLE, WITH LESION REMOVAL USING SNARE;  Surgeon: Jose Faustin MD;  Location: [facility]    CV CENTRAL VENOUS CATHETER PLACEMENT N/A 10/26/2023    Procedure: Central Venous Catheter Placement;  Surgeon: Rob Lyles MD;  Location: [facility] CARDIAC CATH LAB    CV CORONARY ANGIOGRAM N/A 10/27/2023    Procedure: Coronary Angiogram;  Surgeon: Rob Lyles MD;  Location: [facility] CARDIAC CATH LAB    CV CORONARY ANGIOGRAM N/A 10/26/2023    Procedure: Coronary Angiogram;  Surgeon: Rob Lyles MD;  Location: [facility] CARDIAC  CATH LAB    CV LEFT HEART CATH N/A 10/26/2023    Procedure: Left Heart Catheterization;  Surgeon: Rob Lylse MD;  Location:  HEART CARDIAC CATH LAB    CV PCI N/A 10/27/2023    Procedure: Percutaneous Coronary Intervention;  Surgeon: Rob Lyles MD;  Location: [facility] CARDIAC CATH LAB    CV PCI STENT DRUG ELUTING N/A 10/26/2023    Procedure: Percutaneous Coronary Intervention Stent;  Surgeon: Rob Lyles MD;  Location: [facility] CARDIAC CATH LAB       FAMILY HISTORY:  Family History   Problem Relation Age of Onset    Other Cancer Mother     Obesity Mother     GI problems Father     Uterine Cancer Sister        SOCIAL HISTORY:  Social History     Socioeconomic History    Marital status:      Spouse name: None    Number of children: None    Years of education: None    Highest education level: None   Tobacco Use    Smoking status: Former     Packs/day: 0.25     Years: 30.00     Additional pack years: 0.00     Total pack years: 7.50     Types: Cigarettes     Quit date: 10/26/2023     Years since quittin.2     Passive exposure: Current    Smokeless tobacco: Never    Tobacco comments:     Quit 10/26/2023   Vaping Use    Vaping Use: Never used   Substance and Sexual Activity    Alcohol use: Yes     Comment: 1-2 beers per day    Drug use: No    Sexual activity: Yes     Partners: Female     Birth control/protection: None   Other Topics Concern    Parent/sibling w/ CABG, MI or angioplasty before 65F 55M? No     Social Determinants of Health     Financial Resource Strain: Low Risk  (2024)    Financial Resource Strain     Within the past 12 months, have you or your family members you live with been unable to get utilities (heat, electricity) when it was really needed?: No   Food Insecurity: Low Risk  (2024)    Food Insecurity     Within the past 12 months, did you worry that your food would run out before you got money to buy more?: No     Within the past 12 months, did the  food you bought just not last and you didn t have money to get more?: No   Transportation Needs: Low Risk  (1/2/2024)    Transportation Needs     Within the past 12 months, has lack of transportation kept you from medical appointments, getting your medicines, non-medical meetings or appointments, work, or from getting things that you need?: No   Physical Activity: Inactive (11/16/2023)    Exercise Vital Sign     Days of Exercise per Week: 0 days     Minutes of Exercise per Session: 0 min   Social Connections: Unknown (11/16/2023)    Social Connection and Isolation Panel [NHANES]     Marital Status:    Interpersonal Safety: Low Risk  (10/9/2023)    Interpersonal Safety     Do you feel physically and emotionally safe where you currently live?: Yes     Within the past 12 months, have you been hit, slapped, kicked or otherwise physically hurt by someone?: No     Within the past 12 months, have you been humiliated or emotionally abused in other ways by your partner or ex-partner?: No   Housing Stability: Low Risk  (1/2/2024)    Housing Stability     Do you have housing? : Yes     Are you worried about losing your housing?: No       MEDICATIONS:  Current Outpatient Medications   Medication Sig Dispense Refill    amiodarone (PACERONE) 200 MG tablet Take 1 tablet (200 mg) by mouth daily 90 tablet 3    apixaban ANTICOAGULANT (ELIQUIS) 5 MG tablet Take 1 tablet (5 mg) by mouth 2 times daily 180 tablet 3    atorvastatin (LIPITOR) 80 MG tablet Take 1 tablet (80 mg) by mouth daily 90 tablet 3    bumetanide (BUMEX) 1 MG tablet Take 1 tablet (1 mg) by mouth daily 30 tablet 3    cetirizine (ZYRTEC) 10 MG tablet Take 10 mg by mouth daily      clopidogrel (PLAVIX) 75 MG tablet Take 1 tablet (75 mg) by mouth daily 30 tablet 3    escitalopram (LEXAPRO) 10 MG tablet Take 10 mg by mouth daily      fish oil-omega-3 fatty acids 1000 MG capsule Take 1 g by mouth 2 times daily Also contains another supplement      lisinopril (ZESTRIL)  2.5 MG tablet Take 0.5 tablets (1.25 mg) by mouth daily HOLD for systolic blood pressure < 90 45 tablet 1    magnesium oxide (MAG-OX) 400 MG tablet Take 1 tablet (400 mg) by mouth daily 90 tablet 3    melatonin 5 MG tablet Take 1-2 tablets (5-10 mg) by mouth at bedtime 60 tablet 0    metoprolol succinate ER (TOPROL XL) 25 MG 24 hr tablet Take 0.5 tablets (12.5 mg) by mouth daily Hold for systolic blood pressure less than 90. 45 tablet 1    multivitamin w/minerals (THERA-VIT-M) tablet Take 1 tablet by mouth daily      potassium chloride ER (KLOR-CON M) 20 MEQ CR tablet Take 1 tablet (20 mEq) by mouth daily 30 tablet 5    vitamin C (ASCORBIC ACID) 1000 MG TABS Take 1,000 mg by mouth daily       No current facility-administered medications for this visit.        ALLERGIES:     Allergies   Allergen Reactions    Brilinta [Ticagrelor] Other (See Comments) and Difficulty breathing     Per pt and spouse, hyperventilation.    Clonazepam Other (See Comments)     Per spouse, acted like a zombie and he was shaky, could barely talk.       PHYSICAL EXAM:  /66 (BP Location: Right arm, Patient Position: Sitting, Cuff Size: Adult Small)   Pulse 54   Wt 62.1 kg (137 lb)   SpO2 96%   BMI 22.11 kg/m    Gen: alert, interactive, NAD  Neck: supple, no JVD  CV: RRR, no m/r/g  Chest: CTAB, no wheezes or crackles  Abd: soft, NT, ND, +BS  Ext: no LE edema, 2+ peripheral pulses      LABS:    CMP  Last Comprehensive Metabolic Panel:  Sodium   Date Value Ref Range Status   04/12/2024 136 135 - 145 mmol/L Final     Comment:     Reference intervals for this test were updated on 09/26/2023 to more accurately reflect our healthy population. There may be differences in the flagging of prior results with similar values performed with this method. Interpretation of those prior results can be made in the context of the updated reference intervals.    05/26/2021 141 133 - 144 mmol/L Final     Sodium Whole Blood   Date Value Ref Range Status    10/27/2023 138 135 - 145 mmol/L Final     Potassium   Date Value Ref Range Status   04/12/2024 3.9 3.4 - 5.3 mmol/L Final   05/26/2021 4.5 3.4 - 5.3 mmol/L Final     Potassium Whole Blood   Date Value Ref Range Status   10/27/2023 4.1 3.4 - 5.3 mmol/L Final     Chloride   Date Value Ref Range Status   04/12/2024 96 (L) 98 - 107 mmol/L Final   05/26/2021 105 94 - 109 mmol/L Final     Chloride Whole Blood   Date Value Ref Range Status   10/27/2023 103 94 - 109 mmol/L Final     Carbon Dioxide   Date Value Ref Range Status   05/26/2021 29 20 - 32 mmol/L Final     Carbon Dioxide (CO2)   Date Value Ref Range Status   04/12/2024 30 (H) 22 - 29 mmol/L Final     Carbon Dioxide Whole Blood   Date Value Ref Range Status   10/27/2023 27 20 - 32 mmol/L Final     Anion Gap   Date Value Ref Range Status   04/12/2024 10 7 - 15 mmol/L Final   05/26/2021 7 3 - 14 mmol/L Final     Glucose   Date Value Ref Range Status   04/12/2024 90 70 - 99 mg/dL Final   05/26/2021 125 (H) 70 - 99 mg/dL Final     GLUCOSE BY METER POCT   Date Value Ref Range Status   11/01/2023 121 (H) 70 - 99 mg/dL Final     Urea Nitrogen   Date Value Ref Range Status   04/12/2024 33.1 (H) 8.0 - 23.0 mg/dL Final   05/26/2021 41 (H) 7 - 30 mg/dL Final     Creatinine   Date Value Ref Range Status   04/12/2024 1.05 0.67 - 1.17 mg/dL Final   05/26/2021 0.80 0.66 - 1.25 mg/dL Final     GFR Estimate   Date Value Ref Range Status   04/12/2024 74 >60 mL/min/1.73m2 Final   05/26/2021 90 >60 mL/min/[1.73_m2] Final     Comment:     Non  GFR Calc  Starting 12/18/2018, serum creatinine based estimated GFR (eGFR) will be   calculated using the Chronic Kidney Disease Epidemiology Collaboration   (CKD-EPI) equation.       GFR, ESTIMATED POCT   Date Value Ref Range Status   10/26/2023 >60 >60 mL/min/1.73m2 Final     Calcium   Date Value Ref Range Status   04/12/2024 9.3 8.8 - 10.2 mg/dL Final   05/26/2021 8.3 (L) 8.5 - 10.1 mg/dL Final     Bilirubin Total   Date  "Value Ref Range Status   02/07/2024 0.6 <=1.2 mg/dL Final   03/20/2018 0.5 0.2 - 1.3 mg/dL Final     Alkaline Phosphatase   Date Value Ref Range Status   02/07/2024 95 40 - 150 U/L Final     Comment:     Reference intervals for this test were updated on 11/14/2023 to more accurately reflect our healthy population. There may be differences in the flagging of prior results with similar values performed with this method. Interpretation of those prior results can be made in the context of the updated reference intervals.   03/20/2018 82 40 - 150 U/L Final     ALT   Date Value Ref Range Status   02/07/2024 29 0 - 70 U/L Final     Comment:     Reference intervals for this test were updated on 6/12/2023 to more accurately reflect our healthy population. There may be differences in the flagging of prior results with similar values performed with this method. Interpretation of those prior results can be made in the context of the updated reference intervals.     03/20/2018 22 0 - 70 U/L Final     AST   Date Value Ref Range Status   02/07/2024 35 0 - 45 U/L Final     Comment:     Reference intervals for this test were updated on 6/12/2023 to more accurately reflect our healthy population. There may be differences in the flagging of prior results with similar values performed with this method. Interpretation of those prior results can be made in the context of the updated reference intervals.   03/20/2018 20 0 - 45 U/L Final       NT-proBNP       TROP  No results found for: \"TROPI\", \"TROPONIN\", \"TROPR\", \"TROPN\"    CBC  CBC RESULTS:   Recent Labs   Lab Test 02/07/24  1122   WBC 5.3   RBC 4.05*   HGB 12.4*   HCT 37.1*   MCV 92   MCH 30.6   MCHC 33.4   RDW 14.4          LIPIDS  Recent Labs   Lab Test 10/26/23  0336 10/26/23  0059   CHOL 96 117   HDL 30* 32*   LDL 60 67   TRIG 29 89       TSH  TSH   Date Value Ref Range Status   12/30/2023 5.24 (H) 0.30 - 4.20 uIU/mL Final       HBA1C  Lab Results   Component Value Date    " A1C 4.8 2023         CARDIAC DATA:    EK2024: Heart rate 56 bpm, sinus bradycardia, anteroseptal infarct age-indeterminate, prolonged QT    Echo:  10/30/2023  Interpretation Summary  Ischemic CM. Large LAD MI. Left ventricular function is decreased. The  ejection fraction is 20-30% (severely reduced).  Global right ventricular function is normal.  No significant valvular abnormalities present.  IVC diameter <2.1 cm collapsing >50% with sniff suggests a normal RA pressure  of 3 mmHg.  No pericardial effusion is present.  No significant changes noted.    2024  1. The left ventricle is moderately dilated. Left ventricular hypertrophy is noted by two-dimensional echocardiography. Proximal septal thickening is noted. Left ventricular systolic function is moderate to severely reduced. The visual ejection fraction is 25-30%. Biplane LVEF is 25%. Diastolic Doppler findings (E/E' ratio and/or other parameters) suggest left ventricular filling pressures are increased. There is severe hypokinesis to akinesis of the mid anterior/anterolateral/anteroseptal/inferoseptal walls and all apical segments of the left ventricle. There is no thrombus seen in the left ventricle.  2. The right ventricle is normal size. The right ventricular systolic function is normal.  3. The left atrium is moderately dilated. Right atrial size is normal. There is no color Doppler evidence of an atrial shunt.  4. There is moderate to mod-severe (2-3+) mitral regurgitation.  5. There is mild to moderate (1-2+) tricuspid regurgitation.Right ventricular systolic pressure is elevated, consistent with moderate pulmonary hypertension.  6. No pericardial effusion.  7. In direct comparison to the previous study dated 10/30/2023, there has been an interval increase in the degree of mitral regurgitation. The other findings are similar.    Stress Test:  None recent    Cardiac MRI:  2023 CMR  Clinical history: 73 year old with anterior STEMI,  cardiac arrest in Oct 2023  Comparison CMR: none  1. The left ventricle is severely enlarged. There is wall thinning and akinesis of the mid-distal anterior,  mid anteroseptum, distal septum and the apex  The global systolic function is severely reduced. The LVEF is  28%.  2. The right ventricle is normal in cavity size. The global systolic function is normal. The RVEF is 52%.   3. The right atrium is normal and the left atrium is severely enlarged.  4. There is mild mitral regurgitation.   5. There is transmural late gadolinium enhancement in mid-distal anterior, mid anteroseptum, distal  sepum  and the apex consistent with a large infarction in the LAD territory.   6. There is no pericardial effusion.  7. There is no intracardiac thrombus.  8. There are bilateral pleural effusions.  CONCLUSIONS:   Ischemic cardiomyopathy with a large anteroapical infarction.   Severely reduced left ventricular function and normal right ventricular function, LVEF 28% and RVEF 52%.    Cardiac Catheterization:  10/26/23    1st Mrg lesion is 20% stenosed.    Prox LAD to Mid LAD lesion is 100% stenosed.    1st Diag-1 lesion is 80% stenosed.    1st Diag-2 lesion is 50% stenosed.    Dist LAD lesion is 100% stenosed.    RPDA lesion is 70% stenosed.    Dist RCA lesion is 40% stenosed.     Anterior STEMI  Two vessel obstructive CAD (100% pLAD occlusion, 70% rPDA stenosis, 80% diagonal 1 stenosis)  PCI of pLAD to mLAD with BRENDA x 1 (Synergy 2.50x 28 mm) post dilated to 2.75 vessel  CVC placement in RIJ  LVEDP of 26 mmHg  Hemostasis of RRA with TR band     10/26/23:   Unchanged angiogram with patient stent and good distal flow in all arteries  Most likely evolving injury related VT   Amiodarone loaded 75 mg and continue on the floor loading and consult EP for possible vest and or EP study    ASSESSMENT/PLAN:  In summary, Duane C Johnson is here today with the following -      Chronic combined systolic diastolic heart failure, ACC/AHA stage C,  NYHA class III, EF 25%  2.  Ischemic cardiomyopathy  3.  Coronary artery disease, history of anterior STEMI in October 2023, s/p PCI of LAD with drug-eluting stents  4. History of VT within 48 hours of his MI, did not receive an ICD, now being scheduled for primary prevention ICD  5.  Ongoing issues with hypotension and hospitalizations for heart failure, most recently in December 2023    Plans - Duane has ischemic cardiomyopathy after an anterior STEMI.  His LVEF has been quite low at 25%.  He also has limited blood pressure although is asymptomatic from this.    Goal Directed Medical Therapies for Heart Failure:     These are indicated irrespective of the etiology of heart failure.  We discussed the primary medications, their side effects, the care plan including frequent follow-ups  for optimization of therapies with monitoring of blood pressures, weights and lab results     No changes today  Beta blocker: he is not taking one, stopped during recent ER visit. Will start metoprolol XL 12.5 mg once daily at bedtime, hold for SBP < 90.  Will also monitor his heart rate on this  ACE/ARB/ARNI: on lisinopril 1.25 mg once daily with instructions to hold for SBP <90  MRA: yet to start it with significant hypotension  SGLT2 inhibitor: yet to start it with significant hypotension    With persistently reduced ejection fraction and significant hypotension limiting GDMT, can consider further risk stratification with right heart cath and cardiopulmonary stress test.  I discussed advanced heart failure therapies evaluation with him today - durable LVAD as an option if there is objective evidence that he is sick enough for it.  He would not be a heart transplant candidate currently due to his age.  He is amenable to getting this testing done now.    Diuretics: Continue Bumex  Cardiac Rehab: completed  ICD/ CRT-D: With persistently reduced EF despite greater than 3 months since revascularization and with limited blood pressure  room for heart failure therapies, he has been referred to EP for consideration of ICD for primary prevention of sudden cardiac death.  He saw Dr. Black recently and will be scheduled for ICD implantation in May 2024  Labs: At follow-up  Follow up: After RHC/CPTX  CV Genetic testing: Deferred in the setting of ischemic cardiomyopathy  Sleep medicine: evaluation with sleep medicine in March 2024     Advised on daily home BP and weight monitoring  Advised on observing caution with salt intake    # CAD s/p PCI to LAD  # STEMI  # VT/VF arrest  - on plavix and apixaban (akinesis of the mid-distal anterior,mid anteroseptum, distal septum and the apex)      I spent 41 minutes in care of the patient today including obtaining recent medical history, personally reviewing recent cardiac testing and/or lab results, today's examination, discussion of testing results and care recommendations with patient.       Thank you for the opportunity to participate in this patient's care. Please feel free to reach out with any questions or concerns.      Ham Cruz MD, FACC  Associate Professor of Medicine  Advanced Heart Failure, LVAD & Cardiac Transplant  Director, Cardio-Obstetrics  Cardio-Oncology  [facility]

## 2024-04-15 NOTE — PROGRESS NOTES
Preoperative Evaluation  [facility]  [address] [place] [id number]  Phone: [phone]  Fax: [phone]  Primary Provider: Jose Phillips  Pre-op Performing Provider: JOSE PHILLIPS  Apr 15, 2024       Duane is a 74 year old, presenting for the following:  Pre-Op Exam    Ischemic cardiomyopathy.  25% EF.  Having implantable defibrillator placed  CAD: stent  to LAD 2023  Systolic CHF: currently asymptomatic.  On medical therapy  Anticoagulated: eliquis and plavix.  Eliquis due to poor anterior wall motion          4/15/2024    10:36 AM   Additional Questions   Roomed by Dee Dee EPPS   Accompanied by self     Surgical Information  Surgery/Procedure: INJECTION, HYDROGEL SPACER AND FIDUCIAL MARKER PLACEMENT   Surgery Location:  OR  Surgeon: Dr. Barros  Surgery Date: 04/22/2024  Time of Surgery:   Where patient plans to recover: At home with family  Fax number for surgical facility: Note does not need to be faxed, will be available electronically in Epic.    Assessment & Plan   Pre op  Ischemic cardiomyopathy.  25% EF.  CAD: stent  to LAD 2023  Systolic CHF: currently asymptomatic.    Anticoagulated: eliquis and plavix    Stop eliquis 2days before.  Stay on plavix per cardiology.  Continue other meds.    The proposed surgical procedure is considered LOW risk.                - No identified additional risk factors other than previously addressed    Antiplatelet or Anticoagulation Medication Instructions   - apixaban (Eliquis), edoxaban (Savaysa), rivaroxaban (Xarelto): Bleeding risk is moderate or high for this procedure AND CrCl  (>=) 50 mL/min. HOLD 2 days before surgery.     Additional Medication Instructions  Patient is to take all scheduled medications on the day of surgery except eliquis     Recommendation  APPROVAL GIVEN to proceed with proposed procedure, without further diagnostic evaluation.                4/15/2024    10:32 AM   Preop Questions   1. Have you ever had a  heart attack or stroke? YES - LAD stent 10/23   2. Have you ever had surgery on your heart or blood vessels, such as a stent placement, a coronary artery bypass, or surgery on an artery in your head, neck, heart, or legs? YES - see above    3. Do you have chest pain with activity? No   4. Do you have a history of  heart failure? No   5. Do you currently have a cold, bronchitis or symptoms of other infection? No   6. Do you have a cough, shortness of breath, or wheezing? No   7. Do you or anyone in your family have previous history of blood clots? No   8. Do you or does anyone in your family have a serious bleeding problem such as prolonged bleeding following surgeries or cuts? No   9. Have you ever had problems with anemia or been told to take iron pills? No   10. Have you had any abnormal blood loss such as black, tarry or bloody stools? No   11. Have you ever had a blood transfusion? YES - in past    11a. Have you ever had a transfusion reaction? No   12. Are you willing to have a blood transfusion if it is medically needed before, during, or after your surgery? Yes   13. Have you or any of your relatives ever had problems with anesthesia? No   14. Do you have sleep apnea, excessive snoring or daytime drowsiness? No   15. Do you have any artifical heart valves or other implanted medical devices like a pacemaker, defibrillator, or continuous glucose monitor? No   16. Do you have artificial joints? No   17. Are you allergic to latex? No     Health Care Directive  Patient does not have a Health Care Directive or Living Will: Discussed advance care planning with patient; however, patient declined at this time.    Preoperative Review of    reviewed - no record of controlled substances prescribed.          Patient Active Problem List    Diagnosis Date Noted    Ischemic cardiomyopathy 04/15/2024     Priority: Medium     25% EF      Anticoagulated 04/15/2024     Priority: Medium     Due to large akinetic area  anterior heart       Left inguinal hernia 01/17/2024     Priority: Medium    Atherosclerosis of native coronary artery of native heart with angina pectoris (H24) 01/02/2024     Priority: Medium     anterior STEMI s/p PCI to the pLAD on 10/26/23 with a VT/VF arrest the next day (10/27) with patent stents and unchanged anatomy on repeat angiogram       Anxiety 11/13/2023     Priority: Medium    Primary hypertension 11/07/2023     Priority: Medium    Systolic CHF (H) 11/07/2023     Priority: Medium    Prostate cancer (H) 09/23/2023     Priority: Medium    Hyperlipidemia LDL goal <130 01/26/2016     Priority: Medium    Tobacco dependence syndrome 01/23/2008     Priority: Medium      Past Medical History:   Diagnosis Date    Benign essential hypertension     CAD (coronary artery disease)     Chronic systolic heart failure (H)     Hypertension     ST elevation myocardial infarction (STEMI), unspecified artery (H)     Ventricular fibrillation (H)      Past Surgical History:   Procedure Laterality Date    COLONOSCOPY N/A 3/23/2023    Procedure: COLONOSCOPY, FLEXIBLE, WITH LESION REMOVAL USING SNARE;  Surgeon: Jose Faustin MD;  Location: [facility]    CV CENTRAL VENOUS CATHETER PLACEMENT N/A 10/26/2023    Procedure: Central Venous Catheter Placement;  Surgeon: Rob Lyles MD;  Location: [facility] CARDIAC CATH LAB    CV CORONARY ANGIOGRAM N/A 10/27/2023    Procedure: Coronary Angiogram;  Surgeon: Rob Lyles MD;  Location: [facility] CARDIAC CATH LAB    CV CORONARY ANGIOGRAM N/A 10/26/2023    Procedure: Coronary Angiogram;  Surgeon: Rob Lyles MD;  Location: [facility] CARDIAC CATH LAB    CV LEFT HEART CATH N/A 10/26/2023    Procedure: Left Heart Catheterization;  Surgeon: Rob Lyles MD;  Location: [facility] CARDIAC CATH LAB    CV PCI N/A 10/27/2023    Procedure: Percutaneous Coronary Intervention;  Surgeon: Rob Lyles MD;  Location: [facility] CARDIAC CATH LAB    CV PCI  STENT DRUG ELUTING N/A 10/26/2023    Procedure: Percutaneous Coronary Intervention Stent;  Surgeon: Rob Lyles MD;  Location:  [facility] CARDIAC CATH LAB     Current Outpatient Medications   Medication Sig Dispense Refill    amiodarone (PACERONE) 200 MG tablet Take 1 tablet (200 mg) by mouth daily 90 tablet 3    apixaban ANTICOAGULANT (ELIQUIS) 5 MG tablet Take 1 tablet (5 mg) by mouth 2 times daily 180 tablet 3    atorvastatin (LIPITOR) 80 MG tablet Take 1 tablet (80 mg) by mouth daily 90 tablet 3    bumetanide (BUMEX) 1 MG tablet Take 1 tablet (1 mg) by mouth daily 30 tablet 3    cetirizine (ZYRTEC) 10 MG tablet Take 10 mg by mouth daily      clopidogrel (PLAVIX) 75 MG tablet Take 1 tablet (75 mg) by mouth daily 30 tablet 3    escitalopram (LEXAPRO) 10 MG tablet Take 10 mg by mouth daily      fish oil-omega-3 fatty acids 1000 MG capsule Take 1 g by mouth 2 times daily Also contains another supplement      lisinopril (ZESTRIL) 2.5 MG tablet Take 0.5 tablets (1.25 mg) by mouth daily HOLD for systolic blood pressure < 90 45 tablet 1    magnesium oxide (MAG-OX) 400 MG tablet Take 1 tablet (400 mg) by mouth daily 90 tablet 3    melatonin 5 MG tablet Take 1-2 tablets (5-10 mg) by mouth at bedtime 60 tablet 0    metoprolol succinate ER (TOPROL XL) 25 MG 24 hr tablet Take 0.5 tablets (12.5 mg) by mouth daily Hold for systolic blood pressure less than 90. 45 tablet 1    multivitamin w/minerals (THERA-VIT-M) tablet Take 1 tablet by mouth daily      potassium chloride ER (KLOR-CON M) 20 MEQ CR tablet Take 1 tablet (20 mEq) by mouth daily 30 tablet 5    vitamin C (ASCORBIC ACID) 1000 MG TABS Take 1,000 mg by mouth daily         Allergies   Allergen Reactions    Brilinta [Ticagrelor] Other (See Comments) and Difficulty breathing     Per pt and spouse, hyperventilation.    Clonazepam Other (See Comments)     Per spouse, acted like a zombie and he was shaky, could barely talk.        Social History     Tobacco Use     "Smoking status: Former     Current packs/day: 0.00     Average packs/day: 0.3 packs/day for 30.0 years (7.5 ttl pk-yrs)     Types: Cigarettes     Start date: 10/26/1993     Quit date: 10/26/2023     Years since quittin.4     Passive exposure: Current    Smokeless tobacco: Never    Tobacco comments:     Quit 10/26/2023   Substance Use Topics    Alcohol use: Yes     Comment: 1-2 beers per day     History   Drug Use No         Review of Systems    Review of Systems  Constitutional, HEENT, cardiovascular, pulmonary, gi and gu systems are negative, except as otherwise noted.    Objective    BP (!) 88/52   Pulse (!) 48   Temp 97.5  F (36.4  C) (Tympanic)   Resp 14   Ht 1.676 m (5' 6\")   Wt 62.6 kg (138 lb)   SpO2 97%   BMI 22.27 kg/m     Estimated body mass index is 22.27 kg/m  as calculated from the following:    Height as of this encounter: 1.676 m (5' 6\").    Weight as of this encounter: 62.6 kg (138 lb).  Physical Exam  GEN: Alert and oriented, in no acute distress  Bharathi, regular, no murmur  EXT: no edema or lesions noted in lower extremities  Neck: neck supple without mass or lymphadenopathy  ENT: oropharynx clear, no exudate or palate/tonsil asymmetry, poor dentition.  RESP: lungs clear bilaterally, good effort  Normal gait      Recent Labs   Lab Test 24  1110 24  1316 24  1122 24  2343 24  2313 23  0542 23  0942 23  0642 23  0606 23  1410 23  0905   HGB  --   --  12.4*  --  12.0*   < >  --    < > 10.5*   < > 12.6*   PLT  --   --  289  --  304   < >  --    < > 368   < > 308   INR  --   --   --   --   --   --  1.44*  --  1.39*   < >  --     133* 134*   < >  --    < >  --    < > 138   < > 139   POTASSIUM 3.9 3.9 4.5   < >  --    < >  --    < > 4.0   < > 4.8   CR 1.05 1.01 1.01   < >  --    < >  --    < > 0.88   < > 1.05   A1C  --   --   --   --   --   --   --   --   --   --  4.8    < > = values in this interval not displayed.    "     Diagnostics  EKG on hold per cardiology day of implantation  Other labs as above     Revised Cardiac Risk Index (RCRI)  The patient has the following serious cardiovascular risks for perioperative complications:   - Coronary Artery Disease (MI, positive stress test, angina, Qs on EKG) = 1 point   - Congestive Heart Failure (pulmonary edema, PND, s3 loki, CXR with pulmonary congestion, basilar rales) = 1 point     RCRI Interpretation: 2 points: Class III (moderate risk - 6.6% complication rate)     Estimated Functional Capacity: Performs 4 METS exercise without symptoms (e.g., light housework, stairs, 4 mph walk, 7 mph bike, slow step dance)           Signed Electronically by: Jose Coles MD  Copy of this evaluation report is provided to requesting physician.

## 2024-04-15 NOTE — PATIENT INSTRUCTIONS
Cardiology Providers you saw during your visit:  Dr. Cruz     Medication changes:  1- None         Follow up:  1- We will schedule you for a cardiopulmonary stress test and Right Heart Cath. This heart cath should be scheduled with a heart failure doctor and should be done before your ICD implant.     2- Follow up with Dr. Cruz after testing     CardioPulmonary Stress Test (CPX)  CPX is a maximal (meaning you exercise to exhaustion, not to achieve a heart rate) exercise test where we measure how well your heart/body uses oxygen or energy. It is the gold standard for measuring functional capacity and helps us differentiate limitations due to lungs, heart, or fitness.   A CPX is NOT a typical stress test. You will NOT be asked to hold your Beta Blocker medication.      Pre Procedure Instructions - CardioPulmonary Stress Test     Follow these instructions:    1. Report to the [facility] waiting room in the [facility] hospital    2. Nothing to eat for 3 hours prior to your test. You may have clear liquids up to the time of your test    3. Please wear loose, two-piece clothing and comfortable, rubber soled shoes for walking     Pre-procedure instructions - Right heart catheterization  Patient Education    Your arrival time is TBD.  [place] [address] - [facility] Waiting Room  Please plan on being at the hospital all day.  At any time, emergencies and/or urgent cases may come up which could delay the start of your procedure.      No solid food for 8 hours prior  Nothing to drink 2 hours prior to arrival time  You can take your morning medications (except diabetic and blood thinners) with sips of water  We recommend you arrange for a ride to drop you off and pick you up, in the instance, you are unable to drive home, however you should be able to function as you normally would after the procedure              Anticoagulation Medication Instructions    apixaban (ELIQUIS) - Hold 48 hours prior to procedure    You will need to follow up with one of our cardiology APPs 1-2 weeks after your procedure. If you need help scheduling or rescheduling your appointment, please call [phone]        Please call if you have:  1. Weight gain of more than 2 pounds in a day or 5 pounds in a week  2. Increased shortness of breath, swelling or bloating  3. Dizziness, lightheadedness   4. Any questions or concerns.        Follow the American Heart Association Diet and Lifestyle recommendations:  Limit saturated fat, trans fat, sodium, red meat, sweets and sugar-sweetened beverages. If you choose to eat red meat, compare labels and select the leanest cuts available.  Aim for at least 150 minutes of moderate physical activity or 75 minutes of vigorous physical activity - or an equal combination of both - each week.     During business hours: [phone], press option # 1 to schedule an appointment or send a message to your care team     After hours, weekends or holidays: On Call Cardiologist- [phone]   option #4 and ask to speak to the on-call Cardiologist. Inform them you are a CORE/heart failure patient at the [facility].     Chelsey Castillo RN BSN CHFN  Cardiology Nurse Care Coordinator (Heart Failure / C.O.R.E.)

## 2024-04-15 NOTE — LETTER
4/15/2024      RE: Duane C Johnson  [id number] [address] [address]       Dear Colleague,    Thank you for the opportunity to participate in the care of your patient, Duane C Johnson, at the [facility] [place] [facility]. Please see a copy of my visit note below.    [facility]  Advanced Heart Failure Clinic    Patient Name: Duane C Johnson   : 1950   Date of Visit: 04/15/2024    Primary Care Physician: Jose Coles MD     Referring Physician: NADEGE Grier, CNP  Reason for Visit: ischemic cardiomyopathy    Dear Sirisha LIGHT, and Dr. Coles,     I had the pleasure of seeing Duane C Johnson today in the [facility] Advanced Heart Failure Clinic. As you know Duane C Johnson is a pleasant 74 year old year old male, who presents today for further evaluation of ischemic cardiomyopathy.    Mr. Aguila is a 73 year old male with medical history pertinent for anterior STEMI s/p PCI to the pLAD on 10/26/23 with a VT/VF arrest the next day (10/27) with patent stents and unchanged anatomy on repeat angiogram. Placed on amiodarone and discharged 23, ICD was not indicated as this was within 48h of his MI. His EF prior do discharge was 20-30% with a large area of akinesis in the LAD placed on apixaban due to this. He has since been seen in critical care clinic and CORE clinic and established HF care with me in 2024.    Mr. Aguila was readmitted -23 with ADHF and treated with IV lasix. He had some lightheadedness and his metoprolol dose was adjusted. Brillinta changed to the plavix due to SOB. Seen in the ED on 23 for hypotension with BPs 80s/60s. Holding lasix, metoprolol, and losartan. Of note, he was asymptomatic with hypotension. He was seen in the ED on  for chest pain with unremarkable work-up,  admitted - for shortness of breath with bilateral pleural effusions s/p  thoracentesis and ADHF exacerbation. He returned to the ED on 12/30 with SOB and was IV diuresed with bumex and his outpatient regimen was switched to PO bumex. He was instructed to hold losartan and metoprolol for hypotension.      He was last seen in CORE clinic in March when no changes were made due to low BP. Since December, no ER visits or hospitalization for HF.     Today he reports feeling well. He completed cardiac rehab. He has a large property and goes for walks - walking from the post box to his house is 0.25 miles and if walking up a path that  is uphill - he was getting short of breath but can do so on a different path that is flatter. He uses a stair lift at home, and does not take stairs, as it makes him out of breath. He gets lightheaded if he gets up quickly and this resolves quickly. He holds medications for low BP but is unable to tell me more - his wife manages them for him. He tells me he ends up holding medications many days.    He denies chest pain, PND/orthopnea, palpitations, lightheadedness, syncope, leg swelling.      Wife closely monitors sodium and fluid intake. They have been adhering to a 1.5 L FR and 1500 mg sodium restriction.      Home Bps -  / 50-60s, HR 50-65  Weight 138-140 lb      ROS:  A complete 10-point ROS was negative except as above.    PAST MEDICAL HISTORY:  Past Medical History:   Diagnosis Date    Benign essential hypertension     CAD (coronary artery disease)     Chronic systolic heart failure (H)     Hypertension     ST elevation myocardial infarction (STEMI), unspecified artery (H)     Ventricular fibrillation (H)        PAST SURGICAL HISTORY:  Past Surgical History:   Procedure Laterality Date    COLONOSCOPY N/A 3/23/2023    Procedure: COLONOSCOPY, FLEXIBLE, WITH LESION REMOVAL USING SNARE;  Surgeon: Jose Faustin MD;  Location: [facility]    CV CENTRAL VENOUS CATHETER PLACEMENT N/A 10/26/2023    Procedure: Central Venous Catheter Placement;  Surgeon:  Rob Lyles MD;  [facility]    CV CORONARY ANGIOGRAM N/A 10/27/2023    Procedure: Coronary Angiogram;  Surgeon: Rob Lyles MD;  [facility]    CV CORONARY ANGIOGRAM N/A 10/26/2023    Procedure: Coronary Angiogram;  Surgeon: Rob Lyles MD;  [facility]    CV LEFT HEART CATH N/A 10/26/2023    Procedure: Left Heart Catheterization;  Surgeon: Rob Lyles MD;  [facility]    CV PCI N/A 10/27/2023    Procedure: Percutaneous Coronary Intervention;  Surgeon: Rob Lyles MD;  Location:  HEART CARDIAC CATH LAB    CV PCI STENT DRUG ELUTING N/A 10/26/2023    Procedure: Percutaneous Coronary Intervention Stent;  Surgeon: Rob Lyles MD;  Location:  HEART CARDIAC CATH LAB       FAMILY HISTORY:  Family History   Problem Relation Age of Onset    Other Cancer Mother     Obesity Mother     GI problems Father     Uterine Cancer Sister        SOCIAL HISTORY:  Social History     Socioeconomic History    Marital status:      Spouse name: None    Number of children: None    Years of education: None    Highest education level: None   Tobacco Use    Smoking status: Former     Packs/day: 0.25     Years: 30.00     Additional pack years: 0.00     Total pack years: 7.50     Types: Cigarettes     Quit date: 10/26/2023     Years since quittin.2     Passive exposure: Current    Smokeless tobacco: Never    Tobacco comments:     Quit 10/26/2023   Vaping Use    Vaping Use: Never used   Substance and Sexual Activity    Alcohol use: Yes     Comment: 1-2 beers per day    Drug use: No    Sexual activity: Yes     Partners: Female     Birth control/protection: None   Other Topics Concern    Parent/sibling w/ CABG, MI or angioplasty before 65F 55M? No     Social Determinants of Health     Financial Resource Strain: Low Risk  (2024)    Financial Resource Strain     Within the  past 12 months, have you or your family members you live with been unable to get utilities (heat, electricity) when it was really needed?: No   Food Insecurity: Low Risk  (1/2/2024)    Food Insecurity     Within the past 12 months, did you worry that your food would run out before you got money to buy more?: No     Within the past 12 months, did the food you bought just not last and you didn t have money to get more?: No   Transportation Needs: Low Risk  (1/2/2024)    Transportation Needs     Within the past 12 months, has lack of transportation kept you from medical appointments, getting your medicines, non-medical meetings or appointments, work, or from getting things that you need?: No   Physical Activity: Inactive (11/16/2023)    Exercise Vital Sign     Days of Exercise per Week: 0 days     Minutes of Exercise per Session: 0 min   Social Connections: Unknown (11/16/2023)    Social Connection and Isolation Panel [NHANES]     Marital Status:    Interpersonal Safety: Low Risk  (10/9/2023)    Interpersonal Safety     Do you feel physically and emotionally safe where you currently live?: Yes     Within the past 12 months, have you been hit, slapped, kicked or otherwise physically hurt by someone?: No     Within the past 12 months, have you been humiliated or emotionally abused in other ways by your partner or ex-partner?: No   Housing Stability: Low Risk  (1/2/2024)    Housing Stability     Do you have housing? : Yes     Are you worried about losing your housing?: No       MEDICATIONS:  Current Outpatient Medications   Medication Sig Dispense Refill    amiodarone (PACERONE) 200 MG tablet Take 1 tablet (200 mg) by mouth daily 90 tablet 3    apixaban ANTICOAGULANT (ELIQUIS) 5 MG tablet Take 1 tablet (5 mg) by mouth 2 times daily 180 tablet 3    atorvastatin (LIPITOR) 80 MG tablet Take 1 tablet (80 mg) by mouth daily 90 tablet 3    bumetanide (BUMEX) 1 MG tablet Take 1 tablet (1 mg) by mouth daily 30 tablet 3     cetirizine (ZYRTEC) 10 MG tablet Take 10 mg by mouth daily      clopidogrel (PLAVIX) 75 MG tablet Take 1 tablet (75 mg) by mouth daily 30 tablet 3    escitalopram (LEXAPRO) 10 MG tablet Take 10 mg by mouth daily      fish oil-omega-3 fatty acids 1000 MG capsule Take 1 g by mouth 2 times daily Also contains another supplement      lisinopril (ZESTRIL) 2.5 MG tablet Take 0.5 tablets (1.25 mg) by mouth daily HOLD for systolic blood pressure < 90 45 tablet 1    magnesium oxide (MAG-OX) 400 MG tablet Take 1 tablet (400 mg) by mouth daily 90 tablet 3    melatonin 5 MG tablet Take 1-2 tablets (5-10 mg) by mouth at bedtime 60 tablet 0    metoprolol succinate ER (TOPROL XL) 25 MG 24 hr tablet Take 0.5 tablets (12.5 mg) by mouth daily Hold for systolic blood pressure less than 90. 45 tablet 1    multivitamin w/minerals (THERA-VIT-M) tablet Take 1 tablet by mouth daily      potassium chloride ER (KLOR-CON M) 20 MEQ CR tablet Take 1 tablet (20 mEq) by mouth daily 30 tablet 5    vitamin C (ASCORBIC ACID) 1000 MG TABS Take 1,000 mg by mouth daily       No current facility-administered medications for this visit.        ALLERGIES:     Allergies   Allergen Reactions    Brilinta [Ticagrelor] Other (See Comments) and Difficulty breathing     Per pt and spouse, hyperventilation.    Clonazepam Other (See Comments)     Per spouse, acted like a zombie and he was shaky, could barely talk.       PHYSICAL EXAM:  /66 (BP Location: Right arm, Patient Position: Sitting, Cuff Size: Adult Small)   Pulse 54   Wt 62.1 kg (137 lb)   SpO2 96%   BMI 22.11 kg/m    Gen: alert, interactive, NAD  Neck: supple, no JVD  CV: RRR, no m/r/g  Chest: CTAB, no wheezes or crackles  Abd: soft, NT, ND, +BS  Ext: no LE edema, 2+ peripheral pulses      LABS:    CMP  Last Comprehensive Metabolic Panel:  Sodium   Date Value Ref Range Status   04/12/2024 136 135 - 145 mmol/L Final     Comment:     Reference intervals for this test were updated on 09/26/2023 to  more accurately reflect our healthy population. There may be differences in the flagging of prior results with similar values performed with this method. Interpretation of those prior results can be made in the context of the updated reference intervals.    05/26/2021 141 133 - 144 mmol/L Final     Sodium Whole Blood   Date Value Ref Range Status   10/27/2023 138 135 - 145 mmol/L Final     Potassium   Date Value Ref Range Status   04/12/2024 3.9 3.4 - 5.3 mmol/L Final   05/26/2021 4.5 3.4 - 5.3 mmol/L Final     Potassium Whole Blood   Date Value Ref Range Status   10/27/2023 4.1 3.4 - 5.3 mmol/L Final     Chloride   Date Value Ref Range Status   04/12/2024 96 (L) 98 - 107 mmol/L Final   05/26/2021 105 94 - 109 mmol/L Final     Chloride Whole Blood   Date Value Ref Range Status   10/27/2023 103 94 - 109 mmol/L Final     Carbon Dioxide   Date Value Ref Range Status   05/26/2021 29 20 - 32 mmol/L Final     Carbon Dioxide (CO2)   Date Value Ref Range Status   04/12/2024 30 (H) 22 - 29 mmol/L Final     Carbon Dioxide Whole Blood   Date Value Ref Range Status   10/27/2023 27 20 - 32 mmol/L Final     Anion Gap   Date Value Ref Range Status   04/12/2024 10 7 - 15 mmol/L Final   05/26/2021 7 3 - 14 mmol/L Final     Glucose   Date Value Ref Range Status   04/12/2024 90 70 - 99 mg/dL Final   05/26/2021 125 (H) 70 - 99 mg/dL Final     GLUCOSE BY METER POCT   Date Value Ref Range Status   11/01/2023 121 (H) 70 - 99 mg/dL Final     Urea Nitrogen   Date Value Ref Range Status   04/12/2024 33.1 (H) 8.0 - 23.0 mg/dL Final   05/26/2021 41 (H) 7 - 30 mg/dL Final     Creatinine   Date Value Ref Range Status   04/12/2024 1.05 0.67 - 1.17 mg/dL Final   05/26/2021 0.80 0.66 - 1.25 mg/dL Final     GFR Estimate   Date Value Ref Range Status   04/12/2024 74 >60 mL/min/1.73m2 Final   05/26/2021 90 >60 mL/min/[1.73_m2] Final     Comment:     Non  GFR Calc  Starting 12/18/2018, serum creatinine based estimated GFR (eGFR) will be  "  calculated using the Chronic Kidney Disease Epidemiology Collaboration   (CKD-EPI) equation.       GFR, ESTIMATED POCT   Date Value Ref Range Status   10/26/2023 >60 >60 mL/min/1.73m2 Final     Calcium   Date Value Ref Range Status   04/12/2024 9.3 8.8 - 10.2 mg/dL Final   05/26/2021 8.3 (L) 8.5 - 10.1 mg/dL Final     Bilirubin Total   Date Value Ref Range Status   02/07/2024 0.6 <=1.2 mg/dL Final   03/20/2018 0.5 0.2 - 1.3 mg/dL Final     Alkaline Phosphatase   Date Value Ref Range Status   02/07/2024 95 40 - 150 U/L Final     Comment:     Reference intervals for this test were updated on 11/14/2023 to more accurately reflect our healthy population. There may be differences in the flagging of prior results with similar values performed with this method. Interpretation of those prior results can be made in the context of the updated reference intervals.   03/20/2018 82 40 - 150 U/L Final     ALT   Date Value Ref Range Status   02/07/2024 29 0 - 70 U/L Final     Comment:     Reference intervals for this test were updated on 6/12/2023 to more accurately reflect our healthy population. There may be differences in the flagging of prior results with similar values performed with this method. Interpretation of those prior results can be made in the context of the updated reference intervals.     03/20/2018 22 0 - 70 U/L Final     AST   Date Value Ref Range Status   02/07/2024 35 0 - 45 U/L Final     Comment:     Reference intervals for this test were updated on 6/12/2023 to more accurately reflect our healthy population. There may be differences in the flagging of prior results with similar values performed with this method. Interpretation of those prior results can be made in the context of the updated reference intervals.   03/20/2018 20 0 - 45 U/L Final       NT-proBNP       TROP  No results found for: \"TROPI\", \"TROPONIN\", \"TROPR\", \"TROPN\"    CBC  CBC RESULTS:   Recent Labs   Lab Test 02/07/24  1122   WBC 5.3   RBC " 4.05*   HGB 12.4*   HCT 37.1*   MCV 92   MCH 30.6   MCHC 33.4   RDW 14.4          LIPIDS  Recent Labs   Lab Test 10/26/23  0336 10/26/23  0059   CHOL 96 117   HDL 30* 32*   LDL 60 67   TRIG 29 89       TSH  TSH   Date Value Ref Range Status   2023 5.24 (H) 0.30 - 4.20 uIU/mL Final       HBA1C  Lab Results   Component Value Date    A1C 4.8 2023         CARDIAC DATA:    EK2024: Heart rate 56 bpm, sinus bradycardia, anteroseptal infarct age-indeterminate, prolonged QT    Echo:  10/30/2023  Interpretation Summary  Ischemic CM. Large LAD MI. Left ventricular function is decreased. The  ejection fraction is 20-30% (severely reduced).  Global right ventricular function is normal.  No significant valvular abnormalities present.  IVC diameter <2.1 cm collapsing >50% with sniff suggests a normal RA pressure  of 3 mmHg.  No pericardial effusion is present.  No significant changes noted.    2024  1. The left ventricle is moderately dilated. Left ventricular hypertrophy is noted by two-dimensional echocardiography. Proximal septal thickening is noted. Left ventricular systolic function is moderate to severely reduced. The visual ejection fraction is 25-30%. Biplane LVEF is 25%. Diastolic Doppler findings (E/E' ratio and/or other parameters) suggest left ventricular filling pressures are increased. There is severe hypokinesis to akinesis of the mid anterior/anterolateral/anteroseptal/inferoseptal walls and all apical segments of the left ventricle. There is no thrombus seen in the left ventricle.  2. The right ventricle is normal size. The right ventricular systolic function is normal.  3. The left atrium is moderately dilated. Right atrial size is normal. There is no color Doppler evidence of an atrial shunt.  4. There is moderate to mod-severe (2-3+) mitral regurgitation.  5. There is mild to moderate (1-2+) tricuspid regurgitation.Right ventricular systolic pressure is elevated, consistent with  moderate pulmonary hypertension.  6. No pericardial effusion.  7. In direct comparison to the previous study dated 10/30/2023, there has been an interval increase in the degree of mitral regurgitation. The other findings are similar.    Stress Test:  None recent    Cardiac MRI:  12/6/2023 [facility]  Clinical history: 73 year old with anterior STEMI, cardiac arrest in Oct 2023  Comparison CMR: none  1. The left ventricle is severely enlarged. There is wall thinning and akinesis of the mid-distal anterior,  mid anteroseptum, distal septum and the apex  The global systolic function is severely reduced. The LVEF is  28%.  2. The right ventricle is normal in cavity size. The global systolic function is normal. The RVEF is 52%.   3. The right atrium is normal and the left atrium is severely enlarged.  4. There is mild mitral regurgitation.   5. There is transmural late gadolinium enhancement in mid-distal anterior, mid anteroseptum, distal  sepum  and the apex consistent with a large infarction in the LAD territory.   6. There is no pericardial effusion.  7. There is no intracardiac thrombus.  8. There are bilateral pleural effusions.  CONCLUSIONS:   Ischemic cardiomyopathy with a large anteroapical infarction.   Severely reduced left ventricular function and normal right ventricular function, LVEF 28% and RVEF 52%.    Cardiac Catheterization:  10/26/23    1st Mrg lesion is 20% stenosed.    Prox LAD to Mid LAD lesion is 100% stenosed.    1st Diag-1 lesion is 80% stenosed.    1st Diag-2 lesion is 50% stenosed.    Dist LAD lesion is 100% stenosed.    RPDA lesion is 70% stenosed.    Dist RCA lesion is 40% stenosed.     Anterior STEMI  Two vessel obstructive CAD (100% pLAD occlusion, 70% rPDA stenosis, 80% diagonal 1 stenosis)  PCI of pLAD to mLAD with BRENDA x 1 (Synergy 2.50x 28 mm) post dilated to 2.75 vessel  CVC placement in RIJ  LVEDP of 26 mmHg  Hemostasis of RRA with TR band     10/26/23:   Unchanged angiogram with patient  stent and good distal flow in all arteries  Most likely evolving injury related VT   Amiodarone loaded 75 mg and continue on the floor loading and consult EP for possible vest and or EP study    ASSESSMENT/PLAN:  In summary, Duane C Johnson is here today with the following -      Chronic combined systolic diastolic heart failure, ACC/AHA stage C, NYHA class III, EF 25%  2.  Ischemic cardiomyopathy  3.  Coronary artery disease, history of anterior STEMI in October 2023, s/p PCI of LAD with drug-eluting stents  4. History of VT within 48 hours of his MI, did not receive an ICD, now being scheduled for primary prevention ICD  5.  Ongoing issues with hypotension and hospitalizations for heart failure, most recently in December 2023    Plans -  Duane has ischemic cardiomyopathy after an anterior STEMI.  His LVEF has been quite low at 25%.  He also has limited blood pressure although is asymptomatic from this.    Goal Directed Medical Therapies for Heart Failure:     These are indicated irrespective of the etiology of heart failure.  We discussed the primary medications, their side effects, the care plan including frequent follow-ups  for optimization of therapies with monitoring of blood pressures, weights and lab results     No changes today  Beta blocker: he is not taking one, stopped during recent ER visit. Will start metoprolol XL 12.5 mg once daily at bedtime, hold for SBP < 90.  Will also monitor his heart rate on this  ACE/ARB/ARNI: on lisinopril 1.25 mg once daily with instructions to hold for SBP <90  MRA: yet to start it with significant hypotension  SGLT2 inhibitor: yet to start it with significant hypotension    With persistently reduced ejection fraction and significant hypotension limiting GDMT, can consider further risk stratification with right heart cath and cardiopulmonary stress test.  I discussed advanced heart failure therapies evaluation with him today - durable LVAD as an option if there is  objective evidence that he is sick enough for it.  He would not be a heart transplant candidate currently due to his age.  He is amenable to getting this testing done now.    Diuretics: Continue Bumex  Cardiac Rehab: completed  ICD/ CRT-D: With persistently reduced EF despite greater than 3 months since revascularization and with limited blood pressure room for heart failure therapies, he has been referred to EP for consideration of ICD for primary prevention of sudden cardiac death.  He saw Dr. Black recently and will be scheduled for ICD implantation in May 2024  Labs: At follow-up  Follow up: After RHC/CPTX  CV Genetic testing: Deferred in the setting of ischemic cardiomyopathy  Sleep medicine: evaluation with sleep medicine in March 2024     Advised on daily home BP and weight monitoring  Advised on observing caution with salt intake    # CAD s/p PCI to LAD  # STEMI  # VT/VF arrest  - on plavix and apixaban (akinesis of the mid-distal anterior,mid anteroseptum, distal septum and the apex)      I spent 41 minutes in care of the patient today including obtaining recent medical history, personally reviewing recent cardiac testing and/or lab results, today's examination, discussion of testing results and care recommendations with patient.       Thank you for the opportunity to participate in this patient's care. Please feel free to reach out with any questions or concerns.      Ham Cruz MD, FACC  Associate Professor of Medicine  Advanced Heart Failure, LVAD & Cardiac Transplant  Director, Cardio-Obstetrics  Cardio-Oncology  [facility]

## 2024-04-15 NOTE — NURSING NOTE
Chief Complaint   Patient presents with    Follow Up     Return HF; 74 year old with chronic systolic heart failure here for follow up with labs prior     Vitals were taken and medications reconciled.    Mary James CNA  4:06 PM

## 2024-04-16 ENCOUNTER — ONCOLOGY VISIT (OUTPATIENT)
Dept: ONCOLOGY | Facility: CLINIC | Age: 74
End: 2024-04-16
Attending: NURSE PRACTITIONER
Payer: MEDICARE

## 2024-04-16 VITALS
HEIGHT: 66 IN | BODY MASS INDEX: 22.02 KG/M2 | HEART RATE: 50 BPM | RESPIRATION RATE: 12 BRPM | SYSTOLIC BLOOD PRESSURE: 102 MMHG | DIASTOLIC BLOOD PRESSURE: 61 MMHG | OXYGEN SATURATION: 98 % | TEMPERATURE: 97.6 F | WEIGHT: 137 LBS

## 2024-04-16 DIAGNOSIS — C61 PROSTATE CANCER (H): Primary | ICD-10-CM

## 2024-04-16 PROCEDURE — 99214 OFFICE O/P EST MOD 30 MIN: CPT | Performed by: NURSE PRACTITIONER

## 2024-04-16 PROCEDURE — G2211 COMPLEX E/M VISIT ADD ON: HCPCS | Performed by: NURSE PRACTITIONER

## 2024-04-16 PROCEDURE — G0463 HOSPITAL OUTPT CLINIC VISIT: HCPCS | Performed by: NURSE PRACTITIONER

## 2024-04-16 ASSESSMENT — PAIN SCALES - GENERAL: PAINLEVEL: NO PAIN (0)

## 2024-04-16 NOTE — LETTER
4/16/2024         RE: Duane C Johnson  [address] [address] [place] [id number]        Dear Colleague,    Thank you for referring your patient, Duane C Johnson, to the [facility]. Please see a copy of my visit note below.    [facility] Hematology and Oncology Consult Note    Patient: Duane C Johnson  MRN: [id number]  Date of Service: Apr 16, 2024        Reason for Visit    Prostate cancer    Primary Oncologist: Dr. Youngblood      Assessment/Plan    # Localized prostate cancer, [place] 4+3 = 7, Grade group 3, Unfavorable intermediate risk  Not a surgical candidate with underlying cardiac comorbidities.     Initiated Lupron (every 3 mth depot) last month and will start concurrent RT next month.     Tolerating Lupron very well, no side effects.    Plan:  -Will start definitive RT soon (Dr. Vallejo)  -Return ~6/11 with Deana/Dr. Youngblood for next Lupron injection. Planning a total of 6 mths duration, so this will be last planned injection  -Plan baseline PSA post-RT for surveillance purposes    # Hx CAD, STEMI, ischemic cardiomyopathy with CHF (EF 25%), HTN  Followed in Cardiology. S/p stent to LAD 2023, on Eliquis and Plavix. Will have an implantable defibrillator placed.     ______________________________________________________________________________    Oncologic History     Cancer Staging   No matching staging information was found for the patient.    9/2023: Stage IIC (zY1w-kD4- cM0) prostate cancer, unfavorable intermediate risk  -Elevated PSA 4.61  -6/2023 MRI prostate: PI RADS 4 lesion in R mid gland peripheral zone; low likelihood of minimal extraprostatic extension. 2 other additional PI RADS 4 lesions.  No evidence of direct invasion of the neurovascular bundle.  No evidence of any pelvic adenopathy.   -9/2023 prostate biopsy: prostatic adenocarcinoma Sarah 4+3 equal 7. Cores from bilateral prostate sites.     -2/2024 restaging MR abd/pelvis and bone scan: no mets. PSA  3.8    Treatment:  -initial plan was for prostatectomy, but he had STEMI/CHF complications ahead of surgery delaying treatment and was then deemed a poor surgical candidate.     -3/19/2024: Lupron every 12 weeks (planning 6 mths). Concurrent RT to start 5/2024    History of presenting illness:  Duane is a 75 yo with localized prostate cancer, poor surgical candidate (cardiac history). He initiated Lupron last month and will be preparing to start concurrent RT in May.     Tolerating Lupron well, has noted no side effects at all.    No  symptoms. No new pain.       ECOG Performance  1 - Can't do physically strenuous work, but fully ambyulatory and can do light sedentary work      Physical Exam        4/15/2024     4:03 PM   Oncology Vitals   Weight 62.143 kg   BSA (m2) 1.7 m2   /66   Pulse 54   SpO2 96 %   Pain Score 0 (None)       General: alert and cooperative. Alone.   HEENT: Head: Normal, normocephalic, atraumatic.  Eye: Normal external eye, conjunctiva, lids cornea, DOMINICK.  Chest: Regular respiratory rate  Extremities: atraumatic, no peripheral edema  CNS: Alert and oriented x3, neurologic exam grossly normal.    Lab Results    Recent Results (from the past 168 hour(s))   Basic metabolic panel   Result Value Ref Range    Sodium 136 135 - 145 mmol/L    Potassium 3.9 3.4 - 5.3 mmol/L    Chloride 96 (L) 98 - 107 mmol/L    Carbon Dioxide (CO2) 30 (H) 22 - 29 mmol/L    Anion Gap 10 7 - 15 mmol/L    Urea Nitrogen 33.1 (H) 8.0 - 23.0 mg/dL    Creatinine 1.05 0.67 - 1.17 mg/dL    GFR Estimate 74 >60 mL/min/1.73m2    Calcium 9.3 8.8 - 10.2 mg/dL    Glucose 90 70 - 99 mg/dL   N terminal pro BNP outpatient   Result Value Ref Range    N Terminal Pro BNP Outpatient 2,606 (H) 0 - 900 pg/mL   Magnesium   Result Value Ref Range    Magnesium 2.0 1.7 - 2.3 mg/dL       Imaging Results    No results found.    Total time 25 minutes, to include face to face visit, review of EMR, ordering, documentation and coordination of care  "on date of service.    complexity modifier for longitudinal care.       Signed by: Deana Schuster NP      Oncology Rooming Note    April 16, 2024 2:53 PM   Duane C Johnson is a 74 year old male who presents for:    Chief Complaint   Patient presents with     Oncology Clinic Visit     Prostate cancer - provider visit only     Initial Vitals: /61 (BP Location: Right arm, Patient Position: Sitting, Cuff Size: Adult Regular)   Pulse 50   Temp 97.6  F (36.4  C) (Tympanic)   Resp 12   Ht 1.676 m (5' 6\")   Wt 62.1 kg (137 lb)   SpO2 98%   BMI 22.11 kg/m   Estimated body mass index is 22.11 kg/m  as calculated from the following:    Height as of this encounter: 1.676 m (5' 6\").    Weight as of this encounter: 62.1 kg (137 lb). Body surface area is 1.7 meters squared.  No Pain (0) Comment: Data Unavailable   No LMP for male patient.  Allergies reviewed: Yes  Medications reviewed: No, Patient states his wife takes care of his medications.    Medications: Medication refills not needed today.  Pharmacy name entered into [employer]: [facility] [place], [place] - [address] [place] [address]    Frailty Screening:   Is the patient here for a new oncology consult visit in cancer care? 2. No      Clinical concerns:  None      Anna Marie Velez CMA                Again, thank you for allowing me to participate in the care of your patient.        Sincerely,        Deana Schuster NP  "

## 2024-04-16 NOTE — PROGRESS NOTES
"Oncology Rooming Note    April 16, 2024 2:53 PM   Duane C Johnson is a 74 year old male who presents for:    Chief Complaint   Patient presents with    Oncology Clinic Visit     Prostate cancer - provider visit only     Initial Vitals: /61 (BP Location: Right arm, Patient Position: Sitting, Cuff Size: Adult Regular)   Pulse 50   Temp 97.6  F (36.4  C) (Tympanic)   Resp 12   Ht 1.676 m (5' 6\")   Wt 62.1 kg (137 lb)   SpO2 98%   BMI 22.11 kg/m   Estimated body mass index is 22.11 kg/m  as calculated from the following:    Height as of this encounter: 1.676 m (5' 6\").    Weight as of this encounter: 62.1 kg (137 lb). Body surface area is 1.7 meters squared.  No Pain (0) Comment: Data Unavailable   No LMP for male patient.  Allergies reviewed: Yes  Medications reviewed: No, Patient states his wife takes care of his medications.    Medications: Medication refills not needed today.  Pharmacy name entered into [facility]: [facility] [place], [place] - [address] [place] [address]    Frailty Screening:   Is the patient here for a new oncology consult visit in cancer care? 2. No      Clinical concerns:  None      Anna Marie Velez CMA              "

## 2024-04-16 NOTE — NURSING NOTE
Diet: Patient instructed regarding a heart healthy diet, including discussion of reduced fat and sodium intake. Patient demonstrated understanding of this information and agreed to call with further questions or concerns.  Labs: Patient was given results of the laboratory testing obtained today. Patient was instructed to return for the next laboratory testing with right heart cath. Patient demonstrated understanding of this information and agreed to call with further questions or concerns.   Return Appointment: Patient given instructions regarding scheduling next clinic visit. Patient demonstrated understanding of this information and agreed to call with further questions or concerns.  Needs CPX and RHC with Dr. Anthony ashford after this. Testing needs to be done prior to ICD implant. Message sent to  to call wife directly as she does the scheduling.     I spoke with Duane's wife over phone and reviewed the indications for these test. I also told her that we really recommend that she be at his follow up visit to go through his test results and next steps. She verbalized understanding.     Patient stated he understood all health information given and agreed to call with further questions or concerns.

## 2024-04-16 NOTE — PROGRESS NOTES
[facility] Hematology and Oncology Consult Note    Patient: Duane C Johnson  MRN: [id number]  Date of Service: Apr 16, 2024        Reason for Visit    Prostate cancer    Primary Oncologist: Dr. Youngblood      Assessment/Plan    # Localized prostate cancer, [place] 4+3 = 7, Grade group 3, Unfavorable intermediate risk  Not a surgical candidate with underlying cardiac comorbidities.     Initiated Lupron (every 3 mth depot) last month and will start concurrent RT next month.     Tolerating Lupron very well, no side effects.    Plan:  -Will start definitive RT soon (Dr. Vallejo)  -Return ~6/11 with Deana/Dr. Youngblood for next Lupron injection. Planning a total of 6 mths duration, so this will be last planned injection  -Plan baseline PSA post-RT for surveillance purposes    # Hx CAD, STEMI, ischemic cardiomyopathy with CHF (EF 25%), HTN  Followed in Cardiology. S/p stent to LAD 2023, on Eliquis and Plavix. Will have an implantable defibrillator placed.     ______________________________________________________________________________    Oncologic History     Cancer Staging   No matching staging information was found for the patient.    9/2023: Stage IIC (fU9d-pR4- cM0) prostate cancer, unfavorable intermediate risk  -Elevated PSA 4.61  -6/2023 MRI prostate: PI RADS 4 lesion in R mid gland peripheral zone; low likelihood of minimal extraprostatic extension. 2 other additional PI RADS 4 lesions.  No evidence of direct invasion of the neurovascular bundle.  No evidence of any pelvic adenopathy.   -9/2023 prostate biopsy: prostatic adenocarcinoma [place] 4+3 equal 7. Cores from bilateral prostate sites.     -2/2024 restaging MR abd/pelvis and bone scan: no mets. PSA 3.8    Treatment:  -initial plan was for prostatectomy, but he had STEMI/CHF complications ahead of surgery delaying treatment and was then deemed a poor surgical candidate.     -3/19/2024: Lupron every 12 weeks (planning 6 mths). Concurrent RT to start  [___] : [unfilled] 5/2024    History of presenting illness:  Duane is a 73 yo with localized prostate cancer, poor surgical candidate (cardiac history). He initiated Lupron last month and will be preparing to start concurrent RT in May.     Tolerating Lupron well, has noted no side effects at all.    No  symptoms. No new pain.       ECOG Performance  1 - Can't do physically strenuous work, but fully ambyulatory and can do light sedentary work      Physical Exam        4/15/2024     4:03 PM   Oncology Vitals   Weight 62.143 kg   BSA (m2) 1.7 m2   /66   Pulse 54   SpO2 96 %   Pain Score 0 (None)       General: alert and cooperative. Alone.   HEENT: Head: Normal, normocephalic, atraumatic.  Eye: Normal external eye, conjunctiva, lids cornea, DOMINICK.  Chest: Regular respiratory rate  Extremities: atraumatic, no peripheral edema  CNS: Alert and oriented x3, neurologic exam grossly normal.    Lab Results    Recent Results (from the past 168 hour(s))   Basic metabolic panel   Result Value Ref Range    Sodium 136 135 - 145 mmol/L    Potassium 3.9 3.4 - 5.3 mmol/L    Chloride 96 (L) 98 - 107 mmol/L    Carbon Dioxide (CO2) 30 (H) 22 - 29 mmol/L    Anion Gap 10 7 - 15 mmol/L    Urea Nitrogen 33.1 (H) 8.0 - 23.0 mg/dL    Creatinine 1.05 0.67 - 1.17 mg/dL    GFR Estimate 74 >60 mL/min/1.73m2    Calcium 9.3 8.8 - 10.2 mg/dL    Glucose 90 70 - 99 mg/dL   N terminal pro BNP outpatient   Result Value Ref Range    N Terminal Pro BNP Outpatient 2,606 (H) 0 - 900 pg/mL   Magnesium   Result Value Ref Range    Magnesium 2.0 1.7 - 2.3 mg/dL       Imaging Results    No results found.    Total time 25 minutes, to include face to face visit, review of EMR, ordering, documentation and coordination of care on date of service.    complexity modifier for longitudinal care.       Signed by: Deana Schuster NP

## 2024-04-17 ENCOUNTER — CARE COORDINATION (OUTPATIENT)
Dept: NEUROPSYCHOLOGY | Facility: CLINIC | Age: 74
End: 2024-04-17
Payer: MEDICARE

## 2024-04-17 NOTE — PROGRESS NOTES
Patient arrived mistakenly arrived this morning for his 4/18/2024 appointment with Dr. Sedrick Lehman.  He was asking why he needed to come for this appointment and could it be done today or cancelled.  I explained that his appointment was part of his surgical team's protocol for his heart issues.  He did not want to come back tomorrow due to the distance of his drive.  I explained that he should contact his transplant coordinator to try scheduling an appointment when he would be in town for another appointment, or he would need to come in tomorrow for the appointment.  He stated his wife takes care of his appointments.  He seemed satisfied with the options I had given him.  Dr. Sedrick Lehman will be notified of this encounter.

## 2024-04-21 ENCOUNTER — ANESTHESIA EVENT (OUTPATIENT)
Dept: SURGERY | Facility: CLINIC | Age: 74
End: 2024-04-21
Payer: MEDICARE

## 2024-04-21 ASSESSMENT — LIFESTYLE VARIABLES: TOBACCO_USE: 1

## 2024-04-21 NOTE — ANESTHESIA PREPROCEDURE EVALUATION
Anesthesia Pre-Procedure Evaluation    Patient: Duane C Johnson   MRN: [id number] : 1950        Procedure : Procedure(s):  INJECTION, HYDROGEL SPACER AND FIDUCIAL MARKER PLACEMENT (Rectum)           Past Medical History:   Diagnosis Date     Benign essential hypertension      CAD (coronary artery disease)      Chronic systolic heart failure (H)      Hypertension      ST elevation myocardial infarction (STEMI), unspecified artery (H)      Ventricular fibrillation (H)       Past Surgical History:   Procedure Laterality Date     COLONOSCOPY N/A 3/23/2023    Procedure: COLONOSCOPY, FLEXIBLE, WITH LESION REMOVAL USING SNARE;  Surgeon: Jose Faustin MD;  Location: [facility]     CV CENTRAL VENOUS CATHETER PLACEMENT N/A 10/26/2023    Procedure: Central Venous Catheter Placement;  Surgeon: Rob Lyles MD;  Location: [facility] CARDIAC CATH LAB     CV CORONARY ANGIOGRAM N/A 10/27/2023    Procedure: Coronary Angiogram;  Surgeon: Rob Lyles MD;  Location: [facility] CARDIAC CATH LAB     CV CORONARY ANGIOGRAM N/A 10/26/2023    Procedure: Coronary Angiogram;  Surgeon: Rob Lyles MD;  Location: [facility] CARDIAC CATH LAB     CV LEFT HEART CATH N/A 10/26/2023    Procedure: Left Heart Catheterization;  Surgeon: Rob Lyles MD;  Location: [facility] CARDIAC CATH LAB     CV PCI N/A 10/27/2023    Procedure: Percutaneous Coronary Intervention;  Surgeon: Rob Lyles MD;  Location: [facility] CARDIAC CATH LAB     CV PCI STENT DRUG ELUTING N/A 10/26/2023    Procedure: Percutaneous Coronary Intervention Stent;  Surgeon: Rob Lyles MD;  Location: [facility] CARDIAC CATH LAB      Allergies   Allergen Reactions     Brilinta [Ticagrelor] Other (See Comments) and Difficulty breathing     Per pt and spouse, hyperventilation.     Clonazepam Other (See Comments)     Per spouse, acted like a zombie and he was shaky, could barely talk.      Social History     Tobacco Use      Smoking status: Former     Current packs/day: 0.00     Average packs/day: 0.3 packs/day for 30.0 years (7.5 ttl pk-yrs)     Types: Cigarettes     Start date: 10/26/1993     Quit date: 10/26/2023     Years since quittin.4     Passive exposure: Current     Smokeless tobacco: Never     Tobacco comments:     Quit 10/26/2023   Substance Use Topics     Alcohol use: Yes     Comment: 1-2 beers per day      Wt Readings from Last 1 Encounters:   24 62.1 kg (137 lb)        Anesthesia Evaluation   Pt has had prior anesthetic. Type: MAC.        ROS/MED HX  ENT/Pulmonary:  - neg pulmonary ROS   (+)                tobacco use, Current use,                       Neurologic:  - neg neurologic ROS     Cardiovascular: Comment: anterior STEMI s/p PCI to the pLAD on 10/26/23 with a VT/VF arrest the next day    Ischemic cardiomyopathy.  25% EF.  CAD: stent  to LAD   Systolic CHF: currently asymptomatic.    Anticoagulated: eliquis and plavix    2024 Implantable Cardioverter Defibrillator Device & Lead Implant Dual          (+) Dyslipidemia hypertension- -  CAD angina- past MI - stent-. 1  Taking blood thinners   CHF etiology: Chronic combined systolic and diastolic heart failure                    valvular problems/murmurs type: MR     Previous cardiac testing   Echo: Date: 2024 Results:   The left ventricle is moderately dilated. Left ventricular hypertrophy is  noted by two-dimensional echocardiography. Proximal septal thickening is  noted. Left ventricular systolic function is moderate to severely reduced. The  visual ejection fraction is 25-30%. Biplane LVEF is 25%. Diastolic Doppler  findings (E/E' ratio and/or other parameters) suggest left ventricular filling  pressures are increased. There is severe hypokinesis to akinesis of the mid  anterior/anterolateral/anteroseptal/inferoseptal walls and all apical segments  of the left ventricle. There is no thrombus seen in the left ventricle.  2. The right  ventricle is normal size. The right ventricular systolic function  is normal.  3. The left atrium is moderately dilated. Right atrial size is normal. There  is no color Doppler evidence of an atrial shunt.  4. There is moderate to mod-severe (2-3+) mitral regurgitation.  5. There is mild to moderate (1-2+) tricuspid regurgitation.Right ventricular  systolic pressure is elevated, consistent with moderate pulmonary  hypertension.  6. No pericardial effusion.  7. In direct comparison to the previous study dated 10/30/2023, there has been  an interval increase in the degree of mitral regurgitation. The other findings  are similar.  ______________________________________________________________________________  Left Ventricle  The left ventricle is moderately dilated. Left ventricular hypertrophy is  noted by two-dimensional echocardiography. Proximal septal thickening is  noted. Left ventricular systolic function is moderate to severely reduced. The  visual ejection fraction is 25-30%. Biplane LVEF is 25%. Diastolic Doppler  findings (E/E' ratio and/or other parameters) suggest left ventricular filling  pressures are increased. There is severe hypokinesis to akinesis of the mid  anterior/anterolateral/anteroseptal/inferoseptal walls and all apical segments  of the left ventricle. There is no thrombus seen in the left ventricle.     Right Ventricle  The right ventricle is normal size. The right ventricular systolic function is  normal.     Atria  The left atrium is moderately dilated. Right atrial size is normal. There is  no color Doppler evidence of an atrial shunt.     Mitral Valve  There is moderate to mod-severe (2-3+) mitral regurgitation.     Tricuspid Valve  There is mild to moderate (1-2+) tricuspid regurgitation. The right  ventricular systolic pressure is approximated at 51.6 mmHg plus the right  atrial pressure. IVC diameter <2.1 cm collapsing >50% with sniff suggests a  normal RA pressure of 3 mmHg. Right  ventricular systolic pressure is elevated,  consistent with moderate pulmonary hypertension.     Aortic Valve  There is mild trileaflet aortic sclerosis. No aortic regurgitation is present.  No aortic stenosis is present.     Pulmonic Valve  There is mild (1+) pulmonic valvular regurgitation. There is no pulmonic  valvular stenosis.     Vessels  The aortic root is normal size. Normal size ascending aorta. The inferior vena  cava is normal.     Pericardium  There is no pericardial effusion. Moderate left pleural effusion.     Rhythm  Sinus rhythm was noted.    Stress Test:  Date: Results:    ECG Reviewed:  Date: 2/9/2024 Results:  SR  Cath:  Date: 4/16/2024 Results:      METS/Exercise Tolerance:     Hematologic:  - neg hematologic  ROS     Musculoskeletal:       GI/Hepatic:  - neg GI/hepatic ROS     Renal/Genitourinary: Comment: Prostate cancer       Endo:  - neg endo ROS     Psychiatric/Substance Use:     (+) psychiatric history anxiety       Infectious Disease:  - neg infectious disease ROS     Malignancy:   (+) Malignancy, History of Prostate.Prostate CA Active status post.      Other:            Physical Exam    Airway  airway exam normal      Mallampati: I   TM distance: > 3 FB   Neck ROM: full     Respiratory Devices and Support         Dental       (+) Modest Abnormalities - crowns, retainers, 1 or 2 missing teeth      Cardiovascular          Rhythm and rate: regular and normal     Pulmonary           breath sounds clear to auscultation         OUTSIDE LABS:  CBC:   Lab Results   Component Value Date    WBC 5.3 02/07/2024    WBC 5.3 01/12/2024    HGB 12.4 (L) 02/07/2024    HGB 12.0 (L) 01/12/2024    HCT 37.1 (L) 02/07/2024    HCT 36.3 (L) 01/12/2024     02/07/2024     01/12/2024     BMP:   Lab Results   Component Value Date     04/12/2024     (L) 03/06/2024    POTASSIUM 3.9 04/12/2024    POTASSIUM 3.9 03/06/2024    CHLORIDE 96 (L) 04/12/2024    CHLORIDE 95 (L) 03/06/2024    CO2 30 (H)  "04/12/2024    CO2 30 (H) 03/06/2024    BUN 33.1 (H) 04/12/2024    BUN 25.0 (H) 03/06/2024    CR 1.05 04/12/2024    CR 1.01 03/06/2024    GLC 90 04/12/2024     (H) 03/06/2024     COAGS:   Lab Results   Component Value Date    PTT 31 10/27/2023    INR 1.44 (H) 12/27/2023     POC: No results found for: \"BGM\", \"HCG\", \"HCGS\"  HEPATIC:   Lab Results   Component Value Date    ALBUMIN 4.1 02/07/2024    PROTTOTAL 7.4 02/07/2024    ALT 29 02/07/2024    AST 35 02/07/2024    ALKPHOS 95 02/07/2024    BILITOTAL 0.6 02/07/2024     OTHER:   Lab Results   Component Value Date    PH 7.42 10/27/2023    LACT 0.7 12/30/2023    A1C 4.8 03/13/2023    PRANAV 9.3 04/12/2024    PHOS 3.8 12/26/2023    MAG 2.0 04/12/2024    LIPASE 28 12/30/2023    TSH 5.24 (H) 12/30/2023    T4 1.49 12/30/2023       Anesthesia Plan    ASA Status:  3    NPO Status:  NPO Appropriate    Anesthesia Type: MAC.   Induction: Propofol, Intravenous.   Maintenance: TIVA.        Consents    Anesthesia Plan(s) and associated risks, benefits, and realistic alternatives discussed. Questions answered and patient/representative(s) expressed understanding.     - Discussed: Risks, Benefits and Alternatives for BOTH SEDATION and the PROCEDURE were discussed     - Discussed with:  Patient      - Extended Intubation/Ventilatory Support Discussed: No.      - Patient is DNR/DNI Status: No     Use of blood products discussed: No .     Postoperative Care       PONV prophylaxis: Ondansetron (or other 5HT-3), Background Propofol Infusion     Comments:    Other Comments: The risks and benefits of anesthesia, and the alternatives where applicable, have been discussed with the patient, and they wish to proceed.               NADEGE Haider CRNA  "

## 2024-04-22 ENCOUNTER — HOSPITAL ENCOUNTER (OUTPATIENT)
Facility: CLINIC | Age: 74
Discharge: HOME | End: 2024-04-22
Attending: UROLOGY | Admitting: UROLOGY
Payer: MEDICARE

## 2024-04-22 ENCOUNTER — ANESTHESIA (OUTPATIENT)
Dept: SURGERY | Facility: CLINIC | Age: 74
End: 2024-04-22
Payer: MEDICARE

## 2024-04-22 VITALS
RESPIRATION RATE: 18 BRPM | SYSTOLIC BLOOD PRESSURE: 95 MMHG | BODY MASS INDEX: 21.58 KG/M2 | DIASTOLIC BLOOD PRESSURE: 63 MMHG | WEIGHT: 133.7 LBS | OXYGEN SATURATION: 94 % | TEMPERATURE: 97.8 F | HEART RATE: 49 BPM

## 2024-04-22 DIAGNOSIS — C61 PROSTATE CANCER (H): Primary | ICD-10-CM

## 2024-04-22 PROCEDURE — 250N000011 HC RX IP 250 OP 636: Performed by: NURSE ANESTHETIST, CERTIFIED REGISTERED

## 2024-04-22 PROCEDURE — 370N000017 HC ANESTHESIA TECHNICAL FEE, PER MIN: Performed by: UROLOGY

## 2024-04-22 PROCEDURE — 250N000009 HC RX 250: Performed by: UROLOGY

## 2024-04-22 PROCEDURE — 76942 ECHO GUIDE FOR BIOPSY: CPT | Mod: 26 | Performed by: UROLOGY

## 2024-04-22 PROCEDURE — 258N000003 HC RX IP 258 OP 636: Performed by: NURSE ANESTHETIST, CERTIFIED REGISTERED

## 2024-04-22 PROCEDURE — 55876 PLACE RT DEVICE/MARKER PROS: CPT | Performed by: UROLOGY

## 2024-04-22 PROCEDURE — A4648 IMPLANTABLE TISSUE MARKER: HCPCS | Performed by: UROLOGY

## 2024-04-22 PROCEDURE — 250N000011 HC RX IP 250 OP 636: Performed by: UROLOGY

## 2024-04-22 PROCEDURE — 360N000074 HC SURGERY LEVEL 1, PER MIN: Performed by: UROLOGY

## 2024-04-22 PROCEDURE — 250N000009 HC RX 250: Performed by: NURSE ANESTHETIST, CERTIFIED REGISTERED

## 2024-04-22 PROCEDURE — 272N000001 HC OR GENERAL SUPPLY STERILE: Performed by: UROLOGY

## 2024-04-22 PROCEDURE — 999N000141 HC STATISTIC PRE-PROCEDURE NURSING ASSESSMENT: Performed by: UROLOGY

## 2024-04-22 PROCEDURE — 710N000012 HC RECOVERY PHASE 2, PER MINUTE: Performed by: UROLOGY

## 2024-04-22 DEVICE — IMPLANTABLE DEVICE: Type: IMPLANTABLE DEVICE | Site: PROSTATE | Status: FUNCTIONAL

## 2024-04-22 RX ORDER — FENTANYL CITRATE 50 UG/ML
INJECTION, SOLUTION INTRAMUSCULAR; INTRAVENOUS PRN
Status: DISCONTINUED | OUTPATIENT
Start: 2024-04-22 | End: 2024-04-22

## 2024-04-22 RX ORDER — EPHEDRINE SULFATE 50 MG/ML
INJECTION, SOLUTION INTRAMUSCULAR; INTRAVENOUS; SUBCUTANEOUS PRN
Status: DISCONTINUED | OUTPATIENT
Start: 2024-04-22 | End: 2024-04-22

## 2024-04-22 RX ORDER — CEFAZOLIN SODIUM/WATER 2 G/20 ML
2 SYRINGE (ML) INTRAVENOUS SEE ADMIN INSTRUCTIONS
Status: DISCONTINUED | OUTPATIENT
Start: 2024-04-22 | End: 2024-04-22 | Stop reason: HOSPADM

## 2024-04-22 RX ORDER — SODIUM CHLORIDE, SODIUM LACTATE, POTASSIUM CHLORIDE, CALCIUM CHLORIDE 600; 310; 30; 20 MG/100ML; MG/100ML; MG/100ML; MG/100ML
INJECTION, SOLUTION INTRAVENOUS CONTINUOUS
Status: DISCONTINUED | OUTPATIENT
Start: 2024-04-22 | End: 2024-04-22 | Stop reason: HOSPADM

## 2024-04-22 RX ORDER — CEPHALEXIN 500 MG/1
500 CAPSULE ORAL 3 TIMES DAILY
Qty: 9 CAPSULE | Refills: 0 | Status: SHIPPED | OUTPATIENT
Start: 2024-04-22 | End: 2024-04-25

## 2024-04-22 RX ORDER — CEFAZOLIN SODIUM/WATER 2 G/20 ML
2 SYRINGE (ML) INTRAVENOUS
Status: COMPLETED | OUTPATIENT
Start: 2024-04-22 | End: 2024-04-22

## 2024-04-22 RX ORDER — PROPOFOL 10 MG/ML
INJECTION, EMULSION INTRAVENOUS CONTINUOUS PRN
Status: DISCONTINUED | OUTPATIENT
Start: 2024-04-22 | End: 2024-04-22

## 2024-04-22 RX ORDER — SODIUM CHLORIDE, SODIUM LACTATE, POTASSIUM CHLORIDE, CALCIUM CHLORIDE 600; 310; 30; 20 MG/100ML; MG/100ML; MG/100ML; MG/100ML
INJECTION, SOLUTION INTRAVENOUS CONTINUOUS PRN
Status: DISCONTINUED | OUTPATIENT
Start: 2024-04-22 | End: 2024-04-22

## 2024-04-22 RX ORDER — LIDOCAINE HYDROCHLORIDE 20 MG/ML
INJECTION, SOLUTION INFILTRATION; PERINEURAL PRN
Status: DISCONTINUED | OUTPATIENT
Start: 2024-04-22 | End: 2024-04-22

## 2024-04-22 RX ADMIN — PROPOFOL 100 MCG/KG/MIN: 10 INJECTION, EMULSION INTRAVENOUS at 12:49

## 2024-04-22 RX ADMIN — Medication 2 G: at 12:48

## 2024-04-22 RX ADMIN — LIDOCAINE HYDROCHLORIDE 40 MG: 20 INJECTION, SOLUTION INFILTRATION; PERINEURAL at 12:48

## 2024-04-22 RX ADMIN — Medication 5 MG: at 13:10

## 2024-04-22 RX ADMIN — MIDAZOLAM 1 MG: 1 INJECTION INTRAMUSCULAR; INTRAVENOUS at 12:46

## 2024-04-22 RX ADMIN — SODIUM CHLORIDE, POTASSIUM CHLORIDE, SODIUM LACTATE AND CALCIUM CHLORIDE: 600; 310; 30; 20 INJECTION, SOLUTION INTRAVENOUS at 12:18

## 2024-04-22 RX ADMIN — FENTANYL CITRATE 50 MCG: 50 INJECTION INTRAMUSCULAR; INTRAVENOUS at 12:53

## 2024-04-22 RX ADMIN — SODIUM CHLORIDE, POTASSIUM CHLORIDE, SODIUM LACTATE AND CALCIUM CHLORIDE: 600; 310; 30; 20 INJECTION, SOLUTION INTRAVENOUS at 12:48

## 2024-04-22 RX ADMIN — Medication 10 MG: at 13:18

## 2024-04-22 ASSESSMENT — ACTIVITIES OF DAILY LIVING (ADL)
ADLS_ACUITY_SCORE: 37

## 2024-04-22 NOTE — OP NOTE
Preop diagnosis: Prostate cancer    Postop diagnosis: Same    Procedure done:  #1 attempted SpaceOAR placement  #2 fiducial marker placement.    Procedure    Patient brought to the open room and placed in a dorsolithotomy position after sedation has been induced.  He was draped and prepped in the usual surgical fashion.  Patient received preop antibiotics.  Prostate ultrasound was placed.  Prostate identified.  Fiducial marker was then placed at the right base, right apex, and left mid gland.  After this is done SpaceOAR solution was then prepared.  Attempted to place the needle in the appropriate space for SpaceOAR injection with not possible due to patient's anatomy.  SpaceOAR was not placed.

## 2024-04-22 NOTE — BRIEF OP NOTE
[facility]    Brief Operative Note    Pre-operative diagnosis: Prostate cancer (H) [C61]  Post-operative diagnosis Same as pre-operative diagnosis    Procedure: INJECTION, HYDROGEL SPACER AND FIDUCIAL MARKER PLACEMENT, N/A - Rectum    Surgeon: Surgeons and Role:     * Stanislaw Barros MD - Primary  Anesthesia: MAC   Estimated Blood Loss: 0 mL from 4/22/2024 12:47 PM to 4/22/2024  1:32 PM      Drains: None  Specimens: * No specimens in log *  Findings:   None.  Complications: None  .  Implants:   Implant Name Type Inv. Item Serial No.  Lot No. LRB No. Used Action   KIT MARKER FIDUCIAL [id number] - [id number] Other KIT MARKER FIDUCIAL [id number]  [employer] [id number] N/A 1 Implanted

## 2024-04-22 NOTE — ANESTHESIA POSTPROCEDURE EVALUATION
Patient: Duane C Johnson    Procedure: Procedure(s):  INJECTION, HYDROGEL SPACER AND FIDUCIAL MARKER PLACEMENT       Anesthesia Type:  MAC    Note:  Disposition: Outpatient   Postop Pain Control: Uneventful            Sign Out: Well controlled pain   PONV: No   Neuro/Psych: Uneventful            Sign Out: Acceptable/Baseline neuro status   Airway/Respiratory: Uneventful            Sign Out: Acceptable/Baseline resp. status   CV/Hemodynamics: Uneventful            Sign Out: Acceptable CV status   Other NRE: NONE   DID A NON-ROUTINE EVENT OCCUR? No    Event details/Postop Comments:  Pt was happy with anesthesia care.  No complications.  I will follow up with the pt if needed.       Last vitals:  Vitals Value Taken Time   BP 99/64 04/22/24 1415   Temp     Pulse 54 04/22/24 1415   Resp 18 04/22/24 1345   SpO2 93 % 04/22/24 1415   Vitals shown include unfiled device data.    Electronically Signed By: NADEGE Haider CRNA  April 22, 2024  2:16 PM

## 2024-04-22 NOTE — ANESTHESIA CARE TRANSFER NOTE
Patient: Duane C Johnson    Procedure: Procedure(s):  INJECTION, HYDROGEL SPACER AND FIDUCIAL MARKER PLACEMENT       Diagnosis: Prostate cancer (H) [C61]  Diagnosis Additional Information: No value filed.    Anesthesia Type:   MAC     Note:    Oropharynx: oropharynx clear of all foreign objects and spontaneously breathing  Level of Consciousness: drowsy  Oxygen Supplementation: room air    Independent Airway: airway patency satisfactory and stable  Dentition: dentition unchanged  Vital Signs Stable: post-procedure vital signs reviewed and stable  Report to RN Given: handoff report given  Patient transferred to: Phase II    Handoff Report: Identifed the Patient, Identified the Reponsible Provider, Reviewed the pertinent medical history, Discussed the surgical course, Reviewed Intra-OP anesthesia mangement and issues during anesthesia, Set expectations for post-procedure period and Allowed opportunity for questions and acknowledgement of understanding  Vitals:  Vitals Value Taken Time   BP     Temp     Pulse     Resp     SpO2         Electronically Signed By: NADEGE Haider CRNA  April 22, 2024  1:36 PM

## 2024-04-29 ENCOUNTER — OFFICE VISIT (OUTPATIENT)
Dept: RADIATION THERAPY | Facility: OUTPATIENT CENTER | Age: 74
End: 2024-04-29
Payer: MEDICARE

## 2024-04-29 DIAGNOSIS — C61 PROSTATE CANCER (H): Primary | ICD-10-CM

## 2024-04-29 NOTE — PROGRESS NOTES
The patient presents for CT simulation.     In the interval, the patient underwent prostate fiducial placement.  SpaceOAR gel was attempted but unable to be placed due to anatomy.    Consent already obtained.    Jsaper Vallejo M.D.  Department of Radiation Oncology  [facility]

## 2024-04-29 NOTE — LETTER
4/29/2024         RE: Duane C Johnson  [address] [address] [place] [id number]        Dear Colleague,    Thank you for referring your patient, Duane C Johnson, to the [facility]ATION [facility]. Please see a copy of my visit note below.    The patient presents for CT simulation.     In the interval, the patient underwent prostate fiducial placement.  SpaceOAR gel was attempted but unable to be placed due to anatomy.    Consent already obtained.    Jasper Vallejo M.D.  Department of Radiation Oncology  [facility]

## 2024-04-30 DIAGNOSIS — I50.22 CHRONIC SYSTOLIC CONGESTIVE HEART FAILURE (H): Primary | ICD-10-CM

## 2024-05-01 ENCOUNTER — TELEPHONE (OUTPATIENT)
Dept: CARDIOLOGY | Facility: CLINIC | Age: 74
End: 2024-05-01
Payer: MEDICARE

## 2024-05-01 RX ORDER — BUMETANIDE 1 MG/1
1 TABLET ORAL DAILY
Qty: 30 TABLET | Refills: 3 | Status: SHIPPED | OUTPATIENT
Start: 2024-05-01 | End: 2024-07-05

## 2024-05-01 NOTE — TELEPHONE ENCOUNTER
Manpreet Meza,     Our mutual patient (Duane Johnson) arrived for his scheduled neuropsychological evaluation a day early, and he spoke with one of our technicians (Riky Freeman). Our technician asked him to come back tomorrow for his scheduled appointment (8 AM on 4/18), as there was no availability to see him today. The patient stated that he no longer wanted to participate in testing, noting that his thinking has improved. 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 The patient was encouraged to return for testing tomorrow, and our technician specifically stated that this evaluation is a required component of a work-up for LVAD/transplant. The patient was encouraged to reach out to you or his other providers with questions. He apparently canceled his appointment later this morning.         S-(situation): Dr. Patel Meza had asked that we call Duane and educate him about the need for a neuro psych evaluation. Duane was in good spirits when I called him and we revisited his event (VT/VF arrest) and his recovery. Duane states he is a very positive person and that being evaluated by neuro psych isn't necessary for him. I let him know that the testing isn't about his mood or emotional status. It is about gauging his ability for understanding, mental concentration and processing information.  Duane informed me that he understands quite a bit and is very well read. He then went on to tell me he is prepared and waiting to get his ICD.    B-(background): Briefly Duane is a 72 yo  with a hx of anterior STEMI s.p PCI to the pLAD on 10/26 with a VT/VF arrest the next day (10/27) with patent stents and unchanged anatomy on repeat angiogram. Placed on amiodarone and discharged 11/03/23  His EF prior to discharge was 20-30% with a large area of akinesis in the LAD and therefore discharged on apixaban. He saw us and [facility] and had a hospital admission 11/6-20 with ADHF  requiring IV diuretics he went back to the ED 11/22 with hypotension and they held his GDMT he was readmitted in december with ADHF. His losartan was switched to lisinopril which he is only taking at very limited doses due to hypotension. He established care with our HF team (Dr Cheek) on 2/7/24. Plan for reapeat echo which was performed today showed no change in  EF sent referral to EP for ICD (QRS is <120ms).   He discussed he is having some episodes at night where he forgets where he is at and it takes him a wile to figure it out. We discussed neuropsych referral which he is open to do.   No changes were made to his medications    A-(assessment): not yet willing to take in information    R-(recommendations): will need to reintroduce at future meetings.

## 2024-05-02 ENCOUNTER — TELEPHONE (OUTPATIENT)
Dept: CARDIOLOGY | Facility: CLINIC | Age: 74
End: 2024-05-02
Payer: MEDICARE

## 2024-05-02 NOTE — TELEPHONE ENCOUNTER
Called Duane and his wife Melissa to review prep instructions for CPX and Right heart cath next week on Monday. Reviewed all instructions below with them over the phone, and sent in a [employer] message. They both verbalized understanding.       CardioPulmonary Stress Test (CPX)  CPX is a maximal (meaning you exercise to exhaustion, not to achieve a heart rate) exercise test where we measure how well your heart/body uses oxygen or energy. It is the gold standard for measuring functional capacity and helps us differentiate limitations due to lungs, heart, or fitness.   A CPX is NOT a typical stress test. You will NOT be asked to hold your Beta Blocker medication.       Pre Procedure Instructions - CardioPulmonary Stress Test      Follow these instructions:     1. Report to the [facility] waiting room in the [facility] hospital     2. Nothing to eat for 3 hours prior to your test. You may have clear liquids up to the time of your test    3. Please wear loose, two-piece clothing and comfortable, rubber soled shoes for walking      Pre-procedure instructions - Right heart catheterization  Patient Education     Your arrival time is 8:00 on Monday May 6th.  Location is [address] Waiting Room  Please plan on being at the hospital all day.  At any time, emergencies and/or urgent cases may come up which could delay the start of your procedure.        No solid food for 8 hours prior  Nothing to drink 2 hours prior to arrival time  You can take your morning medications (except diabetic and blood thinners) with sips of water  We recommend you arrange for a ride to drop you off and pick you up, in the instance, you are unable to drive home, however you should be able to function as you normally would after the procedure              Anticoagulation Medication Instructions   apixaban (ELIQUIS) - Hold 48 hours prior to procedure

## 2024-05-04 ENCOUNTER — HEALTH MAINTENANCE LETTER (OUTPATIENT)
Age: 74
End: 2024-05-04

## 2024-05-06 ENCOUNTER — APPOINTMENT (OUTPATIENT)
Dept: MEDSURG UNIT | Facility: CLINIC | Age: 74
End: 2024-05-06
Attending: INTERNAL MEDICINE
Payer: MEDICARE

## 2024-05-06 ENCOUNTER — APPOINTMENT (OUTPATIENT)
Dept: LAB | Facility: CLINIC | Age: 74
End: 2024-05-06
Attending: INTERNAL MEDICINE
Payer: MEDICARE

## 2024-05-06 ENCOUNTER — HOSPITAL ENCOUNTER (OUTPATIENT)
Facility: CLINIC | Age: 74
Discharge: HOME | End: 2024-05-06
Attending: INTERNAL MEDICINE | Admitting: INTERNAL MEDICINE
Payer: MEDICARE

## 2024-05-06 ENCOUNTER — HOSPITAL ENCOUNTER (OUTPATIENT)
Dept: CARDIOLOGY | Facility: CLINIC | Age: 74
Discharge: HOME | End: 2024-05-06
Attending: INTERNAL MEDICINE | Admitting: INTERNAL MEDICINE
Payer: MEDICARE

## 2024-05-06 VITALS
RESPIRATION RATE: 18 BRPM | WEIGHT: 135 LBS | BODY MASS INDEX: 21.79 KG/M2 | HEART RATE: 57 BPM | DIASTOLIC BLOOD PRESSURE: 72 MMHG | TEMPERATURE: 97.9 F | OXYGEN SATURATION: 99 % | SYSTOLIC BLOOD PRESSURE: 107 MMHG

## 2024-05-06 VITALS — WEIGHT: 136.47 LBS | BODY MASS INDEX: 22.03 KG/M2

## 2024-05-06 DIAGNOSIS — I51.89 OTHER ILL-DEFINED HEART DISEASES: ICD-10-CM

## 2024-05-06 DIAGNOSIS — I50.42 CHRONIC COMBINED SYSTOLIC AND DIASTOLIC HEART FAILURE (H): ICD-10-CM

## 2024-05-06 LAB
ANION GAP SERPL CALCULATED.3IONS-SCNC: 11 MMOL/L (ref 7–15)
BASOPHILS # BLD AUTO: 0.1 10E3/UL (ref 0–0.2)
BASOPHILS NFR BLD AUTO: 1 %
BUN SERPL-MCNC: 22.5 MG/DL (ref 8–23)
CALCIUM SERPL-MCNC: 9.3 MG/DL (ref 8.8–10.2)
CHLORIDE SERPL-SCNC: 97 MMOL/L (ref 98–107)
CREAT SERPL-MCNC: 0.97 MG/DL (ref 0.67–1.17)
DEPRECATED HCO3 PLAS-SCNC: 27 MMOL/L (ref 22–29)
EGFRCR SERPLBLD CKD-EPI 2021: 82 ML/MIN/1.73M2
EOSINOPHIL # BLD AUTO: 0.2 10E3/UL (ref 0–0.7)
EOSINOPHIL NFR BLD AUTO: 3 %
ERYTHROCYTE [DISTWIDTH] IN BLOOD BY AUTOMATED COUNT: 14.6 % (ref 10–15)
GLUCOSE SERPL-MCNC: 76 MG/DL (ref 70–99)
HCT VFR BLD AUTO: 34.3 % (ref 40–53)
HGB BLD-MCNC: 11.2 G/DL (ref 13.3–17.7)
HGB BLD-MCNC: 11.6 G/DL (ref 13.3–17.7)
IMM GRANULOCYTES # BLD: 0 10E3/UL
IMM GRANULOCYTES NFR BLD: 0 %
LYMPHOCYTES # BLD AUTO: 0.8 10E3/UL (ref 0.8–5.3)
LYMPHOCYTES NFR BLD AUTO: 18 %
MCH RBC QN AUTO: 31.5 PG (ref 26.5–33)
MCHC RBC AUTO-ENTMCNC: 33.8 G/DL (ref 31.5–36.5)
MCV RBC AUTO: 93 FL (ref 78–100)
MONOCYTES # BLD AUTO: 0.4 10E3/UL (ref 0–1.3)
MONOCYTES NFR BLD AUTO: 9 %
NEUTROPHILS # BLD AUTO: 3.1 10E3/UL (ref 1.6–8.3)
NEUTROPHILS NFR BLD AUTO: 69 %
NRBC # BLD AUTO: 0 10E3/UL
NRBC BLD AUTO-RTO: 0 /100
PLATELET # BLD AUTO: 282 10E3/UL (ref 150–450)
POTASSIUM SERPL-SCNC: 3.7 MMOL/L (ref 3.4–5.3)
RBC # BLD AUTO: 3.68 10E6/UL (ref 4.4–5.9)
SODIUM SERPL-SCNC: 135 MMOL/L (ref 135–145)
WBC # BLD AUTO: 4.6 10E3/UL (ref 4–11)

## 2024-05-06 PROCEDURE — 85018 HEMOGLOBIN: CPT

## 2024-05-06 PROCEDURE — 93451 RIGHT HEART CATH: CPT | Mod: 26 | Performed by: INTERNAL MEDICINE

## 2024-05-06 PROCEDURE — 999N000132 HC STATISTIC PP CARE STAGE 1

## 2024-05-06 PROCEDURE — 94621 CARDIOPULM EXERCISE TESTING: CPT | Mod: 26 | Performed by: INTERNAL MEDICINE

## 2024-05-06 PROCEDURE — 93451 RIGHT HEART CATH: CPT | Performed by: INTERNAL MEDICINE

## 2024-05-06 PROCEDURE — 85025 COMPLETE CBC W/AUTO DIFF WBC: CPT | Performed by: INTERNAL MEDICINE

## 2024-05-06 PROCEDURE — 250N000009 HC RX 250: Performed by: INTERNAL MEDICINE

## 2024-05-06 PROCEDURE — C1751 CATH, INF, PER/CENT/MIDLINE: HCPCS | Performed by: INTERNAL MEDICINE

## 2024-05-06 PROCEDURE — 272N000001 HC OR GENERAL SUPPLY STERILE: Performed by: INTERNAL MEDICINE

## 2024-05-06 PROCEDURE — 999N000142 HC STATISTIC PROCEDURE PREP ONLY

## 2024-05-06 PROCEDURE — 94621 CARDIOPULM EXERCISE TESTING: CPT

## 2024-05-06 PROCEDURE — 36415 COLL VENOUS BLD VENIPUNCTURE: CPT | Performed by: INTERNAL MEDICINE

## 2024-05-06 PROCEDURE — 80048 BASIC METABOLIC PNL TOTAL CA: CPT | Performed by: INTERNAL MEDICINE

## 2024-05-06 RX ORDER — LIDOCAINE 40 MG/G
CREAM TOPICAL
Status: COMPLETED | OUTPATIENT
Start: 2024-05-06 | End: 2024-05-06

## 2024-05-06 RX ADMIN — LIDOCAINE: 40 CREAM TOPICAL at 10:34

## 2024-05-06 ASSESSMENT — ACTIVITIES OF DAILY LIVING (ADL)
ADLS_ACUITY_SCORE: 37

## 2024-05-06 NOTE — PROGRESS NOTES
2A prep for RHC. Pt alert and oriented. VSS> Denies pain. Appropriately NPO. Lidocaine cream on right neck. Wife Melissa at bedside.   Held Eliquis x 48 hrs

## 2024-05-06 NOTE — PRE-PROCEDURE
GENERAL PRE-PROCEDURE:   Procedure:  Right heart catheterization  Date/Time:  5/6/2024 10:30 AM    Written consent obtained?: Yes    Risks and benefits: Risks, benefits and alternatives were discussed    DC Plan: Appropriate discharge home plan in place for patients who are going home after procedure   Consent given by:  Patient  Patient states understanding of procedure being performed: Yes    Patient's understanding of procedure matches consent: Yes    Procedure consent matches procedure scheduled: Yes    Expected level of sedation:  Minimal  Appropriately NPO:  Yes  ASA Class:  2  Mallampati  :  Grade 1- soft palate, uvula, tonsillar pillars, and posterior pharyngeal wall visible  Lungs:  Lungs clear with good breath sounds bilaterally  Heart:  Normal heart sounds and rate  History & Physical reviewed:  History and physical reviewed and no updates needed  Statement of review:  I have reviewed the lab findings, diagnostic data, medications, and the plan for sedation

## 2024-05-06 NOTE — PROGRESS NOTES
Returned from CCL s/p right heart catheterization.  VSS.  Denies pain.  RIJ site flat, clean and dry.  Declined to eat or drink.  Reviewed discharge instructions with Duane and his wife.  Discharge to home with wife.

## 2024-05-06 NOTE — DISCHARGE INSTRUCTIONS
[facility]                        Interventional Cardiology  Discharge Instructions   Post Right Heart Cath      AFTER YOU GO HOME:  DO drink plenty of fluids  DO resume your regular diet and medications unless otherwise instructed by your Primary Physician  Do Not scrub the procedure site vigorously  No lotion or powder to the puncture site for 3 days    CALL YOUR PRIMARY PHYSICIAN IF: You may resume all normal activity.  Monitor neck site for bleeding, swelling, or voice changes. If you notice bleeding or swelling immediately apply pressure to the site and call number below to speak with Cardiology Fellow.  If you experience any changes in your breathing you should call your doctor immediately or come to the closest Emergency Department.  Do not drive yourself.    ADDITIONAL INSTRUCTIONS: Medications: You are to resume all home medications including anticoagulation therapy unless otherwise advised by your primary cardiologist or nurse coordinator.    Follow Up: Per your primary cardiology team    If you have any questions or concerns regarding your procedure site please call [phone] at anytime and ask for Cardiology Fellow on call.  They are available 24 hours a day.  You may also contact the Cardiology Clinic after hours number at [phone].                                                       Telephone Numbers [phone] Monday-Friday 8:00 am to 4:30 pm    [phone] After 4:30 pm Monday-Friday, Weekends & Holidays  Ask for Interventional Cardiologist on call. Someone is on call 24 hours/day   [facility] toll free number [phone] Monday-Friday 8:00 am to 4:30 pm   [facility] Emergency Dept [phone]

## 2024-05-07 LAB
CARDIOPULMONARY ANAEROBIC THRESHOLD PREDICTED PEAK: 62 %
CARDIOPULMONARY ANAEROBIC THRESHOLD VO2: 16.6 ML/KG/MIN
CARDIOPULMONARY BLOOD PRESSURE REST: NORMAL MMHG
CARDIOPULMONARY BREATHING RESERVE REST: 81.9
CARDIOPULMONARY BREATHING RESERVE V02MAX: 20
CARDIOPULMONARY CO2 OUTPUT REST: 260 ML/MIN
CARDIOPULMONARY CO2 OUTPUT VO2MAX: 1528 ML/MIN
CARDIOPULMONARY FEV 1.0 (L) ACTUAL: 1.88
CARDIOPULMONARY FEV 1.0 (L) PRECENT: 72 %
CARDIOPULMONARY FEV 1.0 (L) PREDICTED: 2.61
CARDIOPULMONARY FEV 1.0 FVC (%) ACTUAL: 71.6
CARDIOPULMONARY FEV 1.0 FVC (%) PERCENT: 98 %
CARDIOPULMONARY FEV 1.0 FVC (%) PREDICTED: 72.8
CARDIOPULMONARY FUNCTIONAL CAPACITY MAX ML/KG/MIN: 18.5 ML/KG/MIN
CARDIOPULMONARY FUNCTIONAL CAPACITY PERCENT: 69 %
CARDIOPULMONARY FUNCTIONAL CAPACITY PREDICTED: 26.8 ML/KG/MIN
CARDIOPULMONARY FVC (L) ACTUAL: 2.63
CARDIOPULMONARY FVC (L) PERCENT: 73 %
CARDIOPULMONARY FVC (L) PREDICTED: 3.62
CARDIOPULMONARY HEART RATE REST: 60 BPM
CARDIOPULMONARY MET'S REST: 1.4
CARDIOPULMONARY MINUTE VENTILATION REST: 11.9 L/MIN
CARDIOPULMONARY MINUTE VENTILATION VO2MAX: 52.7 L/MIN
CARDIOPULMONARY MYOCARDIAC O2 DEMAND MAX: NORMAL
CARDIOPULMONARY OXYGEN CONSUMPTION REST: 5 ML/KG/MIN
CARDIOPULMONARY OXYGEN CONSUMPTION VO2MAX: 18.5 ML/KG/MIN
CARDIOPULMONARY OXYGEN PULSE REST: 5 ML/BEAT
CARDIOPULMONARY OXYGEN PULSE VO2MAX: 10.5 ML/BEAT
CARDIOPULMONARY OXYGEN SATURATION- OXIMETRY REST: 99 %
CARDIOPULMONARY OXYGEN SATURATION- OXIMETRY VO2MAX: 98 %
CARDIOPULMONARY PET C02 REST: 32
CARDIOPULMONARY PET C02 VO2MAX: 34
CARDIOPULMONARY PET02 REST: 109
CARDIOPULMONARY PET02 V02 MAX: 116
CARDIOPULMONARY RER: 1.31
CARDIOPULMONARY RESPIRALORY EXCHANGE RATIO VO2MAX: 1.31
CARDIOPULMONARY RESPIRALORY EXCHANGE RATIO: 0.83
CARDIOPULMONARY RESPIRATORY RATE REST: 24 BR/MIN
CARDIOPULMONARY RESPIRATORY RATE VO2MAX: 34 BR/MIN
CARDIOPULMONARY STRESS BASE 1 BP MMHG: NORMAL MMHG
CARDIOPULMONARY STRESS BASE 1 BPA: 89 BPM
CARDIOPULMONARY STRESS BASE 1 SPO2: 98 % SPO2
CARDIOPULMONARY STRESS BASE 1 TIME SEC: 0 SEC
CARDIOPULMONARY STRESS BASE 1 TIME: 1 MINS
CARDIOPULMONARY STRESS BASE 2 BP MMHG: NORMAL MMHG
CARDIOPULMONARY STRESS BASE 2 BPA: 63 BPM
CARDIOPULMONARY STRESS BASE 2 SPO2: 100 % SPO2
CARDIOPULMONARY STRESS BASE 2 TIME SEC: 0 SEC
CARDIOPULMONARY STRESS BASE 2 TIME: 3 MINS
CARDIOPULMONARY STRESS BASE 3 BP MMHG: NORMAL MMHG
CARDIOPULMONARY STRESS BASE 3 BPA: 60 BPM
CARDIOPULMONARY STRESS BASE 3 SPO2: 100 % SPO2
CARDIOPULMONARY STRESS BASE 3 TIME SEC: 0 SEC
CARDIOPULMONARY STRESS BASE 3 TIME: 5 MINS
CARDIOPULMONARY STRESS PHASE 1 BP MMHG: NORMAL MMHG
CARDIOPULMONARY STRESS PHASE 1 BPM: 77 BPM
CARDIOPULMONARY STRESS PHASE 1 SPO2: 98 % SPO2
CARDIOPULMONARY STRESS PHASE 1 TIME SEC: 0 SEC
CARDIOPULMONARY STRESS PHASE 1 TIME: 3 MINS
CARDIOPULMONARY STRESS PHASE 2 BP MMHG: NORMAL MMHG
CARDIOPULMONARY STRESS PHASE 2 BPM: 93 BPM
CARDIOPULMONARY STRESS PHASE 2 SPO2: 98 % SPO2
CARDIOPULMONARY STRESS PHASE 2 TIME SEC: 0 SEC
CARDIOPULMONARY STRESS PHASE 2 TIME: 6 MINS
CARDIOPULMONARY STRESS PHASE 3 BPM: 109 BPM
CARDIOPULMONARY STRESS PHASE 3 TIME SEC: 15 SEC
CARDIOPULMONARY STRESS PHASE 3 TIME: 6 MINS
CARDIOPULMONARY SVC (L) ACTUAL: 2.84
CARDIOPULMONARY SVC (L) PERCENT: 78 %
CARDIOPULMONARY SVC (L) PREDICTED: 3.62
CARDIOPULMONARY TIDAL VOLUME REST: 491 ML
CARDIOPULMONARY TIDAL VOLUME VO2MAX: 1554 ML
CARDIOPULMONARY VE/VCO2 SLOPE: 33.98
CARDIOPULMONARY VENTILATORY EQUIVALENT 02 REST: 38
CARDIOPULMONARY VENTILATORY EQUIVALENT 02 V02: 43
CARDIOPULMONARY VENTILATORY EQUIVALENT C02 REST: 46
CARDIOPULMONARY VENTILATORY EQUIVALENT C02 SLOPE VO2MAX: 33.98
CARDIOPULMONARY VENTILATORY EQUIVALENT C02 VO2MAX: 34
CV STRESS MAX HR HE: 109
PREDICTED VO2MAX: 26.8
RATED PERCEIVED EXERTION: 17
STRESS ECHO BASELINE BP: NORMAL MMHG
STRESS ECHO BASELINE HR: 60 BPM
STRESS ECHO CALCULATED PERCENT HR: 75 %
STRESS ECHO LAST STRESS BP: NORMAL MMHG
STRESS ECHO POST ESTIMATED WORKLOAD: 5.3 METS
STRESS ECHO POST EXERCISE DUR MIN: 6 MIN
STRESS ECHO POST EXERCISE DUR SEC: 15 SEC
STRESS ECHO TARGET HR: 146

## 2024-05-08 ENCOUNTER — APPOINTMENT (OUTPATIENT)
Dept: LAB | Facility: CLINIC | Age: 74
End: 2024-05-08
Attending: INTERNAL MEDICINE
Payer: MEDICARE

## 2024-05-08 ENCOUNTER — HOSPITAL ENCOUNTER (OUTPATIENT)
Facility: CLINIC | Age: 74
Discharge: HOME | End: 2024-05-08
Attending: INTERNAL MEDICINE | Admitting: INTERNAL MEDICINE
Payer: MEDICARE

## 2024-05-08 ENCOUNTER — ANCILLARY PROCEDURE (OUTPATIENT)
Dept: CARDIOLOGY | Facility: CLINIC | Age: 74
End: 2024-05-08
Payer: MEDICARE

## 2024-05-08 ENCOUNTER — APPOINTMENT (OUTPATIENT)
Dept: GENERAL RADIOLOGY | Facility: CLINIC | Age: 74
End: 2024-05-08
Payer: MEDICARE

## 2024-05-08 ENCOUNTER — APPOINTMENT (OUTPATIENT)
Dept: MEDSURG UNIT | Facility: CLINIC | Age: 74
End: 2024-05-08
Attending: INTERNAL MEDICINE
Payer: MEDICARE

## 2024-05-08 VITALS
SYSTOLIC BLOOD PRESSURE: 95 MMHG | HEART RATE: 60 BPM | BODY MASS INDEX: 21.86 KG/M2 | DIASTOLIC BLOOD PRESSURE: 62 MMHG | WEIGHT: 136.02 LBS | RESPIRATION RATE: 17 BRPM | OXYGEN SATURATION: 94 % | TEMPERATURE: 97.9 F | HEIGHT: 66 IN

## 2024-05-08 DIAGNOSIS — I50.20 HEART FAILURE WITH REDUCED EJECTION FRACTION, NYHA CLASS I (H): ICD-10-CM

## 2024-05-08 DIAGNOSIS — I25.5 ISCHEMIC CARDIOMYOPATHY: ICD-10-CM

## 2024-05-08 DIAGNOSIS — I46.9 CARDIAC ARREST (H): ICD-10-CM

## 2024-05-08 DIAGNOSIS — Z95.810 ICD (IMPLANTABLE CARDIOVERTER-DEFIBRILLATOR) IN PLACE: Primary | ICD-10-CM

## 2024-05-08 LAB
ANION GAP SERPL CALCULATED.3IONS-SCNC: 11 MMOL/L (ref 7–15)
BUN SERPL-MCNC: 28.8 MG/DL (ref 8–23)
CALCIUM SERPL-MCNC: 9.6 MG/DL (ref 8.8–10.2)
CHLORIDE SERPL-SCNC: 100 MMOL/L (ref 98–107)
CREAT SERPL-MCNC: 0.93 MG/DL (ref 0.67–1.17)
DEPRECATED HCO3 PLAS-SCNC: 23 MMOL/L (ref 22–29)
EGFRCR SERPLBLD CKD-EPI 2021: 86 ML/MIN/1.73M2
ERYTHROCYTE [DISTWIDTH] IN BLOOD BY AUTOMATED COUNT: 14.6 % (ref 10–15)
GLUCOSE SERPL-MCNC: 91 MG/DL (ref 70–99)
HCT VFR BLD AUTO: 34.4 % (ref 40–53)
HGB BLD-MCNC: 11.4 G/DL (ref 13.3–17.7)
MCH RBC QN AUTO: 30.7 PG (ref 26.5–33)
MCHC RBC AUTO-ENTMCNC: 33.1 G/DL (ref 31.5–36.5)
MCV RBC AUTO: 93 FL (ref 78–100)
PLATELET # BLD AUTO: 289 10E3/UL (ref 150–450)
POTASSIUM SERPL-SCNC: 4.9 MMOL/L (ref 3.4–5.3)
RBC # BLD AUTO: 3.71 10E6/UL (ref 4.4–5.9)
SODIUM SERPL-SCNC: 134 MMOL/L (ref 135–145)
WBC # BLD AUTO: 5.6 10E3/UL (ref 4–11)

## 2024-05-08 PROCEDURE — 36415 COLL VENOUS BLD VENIPUNCTURE: CPT | Performed by: INTERNAL MEDICINE

## 2024-05-08 PROCEDURE — C1894 INTRO/SHEATH, NON-LASER: HCPCS | Performed by: INTERNAL MEDICINE

## 2024-05-08 PROCEDURE — 99152 MOD SED SAME PHYS/QHP 5/>YRS: CPT | Mod: GC | Performed by: INTERNAL MEDICINE

## 2024-05-08 PROCEDURE — 250N000011 HC RX IP 250 OP 636: Performed by: INTERNAL MEDICINE

## 2024-05-08 PROCEDURE — 71045 X-RAY EXAM CHEST 1 VIEW: CPT | Mod: 26 | Performed by: RADIOLOGY

## 2024-05-08 PROCEDURE — 80048 BASIC METABOLIC PNL TOTAL CA: CPT | Performed by: INTERNAL MEDICINE

## 2024-05-08 PROCEDURE — 33249 INSJ/RPLCMT DEFIB W/LEAD(S): CPT | Mod: GC | Performed by: INTERNAL MEDICINE

## 2024-05-08 PROCEDURE — 999N000142 HC STATISTIC PROCEDURE PREP ONLY

## 2024-05-08 PROCEDURE — C1898 LEAD, PMKR, OTHER THAN TRANS: HCPCS | Performed by: INTERNAL MEDICINE

## 2024-05-08 PROCEDURE — C1777 LEAD, AICD, ENDO SINGLE COIL: HCPCS | Performed by: INTERNAL MEDICINE

## 2024-05-08 PROCEDURE — 999N000054 HC STATISTIC EKG NON-CHARGEABLE

## 2024-05-08 PROCEDURE — 93283 PRGRMG EVAL IMPLANTABLE DFB: CPT

## 2024-05-08 PROCEDURE — 99152 MOD SED SAME PHYS/QHP 5/>YRS: CPT | Performed by: INTERNAL MEDICINE

## 2024-05-08 PROCEDURE — C1721 AICD, DUAL CHAMBER: HCPCS | Performed by: INTERNAL MEDICINE

## 2024-05-08 PROCEDURE — 999N000133 HC STATISTIC PP CARE STAGE 2

## 2024-05-08 PROCEDURE — 999N000065 XR CHEST PORT 1 VIEW

## 2024-05-08 PROCEDURE — 93005 ELECTROCARDIOGRAM TRACING: CPT

## 2024-05-08 PROCEDURE — 250N000009 HC RX 250: Performed by: INTERNAL MEDICINE

## 2024-05-08 PROCEDURE — 99207 CARDIAC DEVICE CHECK - INPATIENT: CPT | Mod: 26 | Performed by: INTERNAL MEDICINE

## 2024-05-08 PROCEDURE — 258N000003 HC RX IP 258 OP 636: Performed by: INTERNAL MEDICINE

## 2024-05-08 PROCEDURE — 85014 HEMATOCRIT: CPT | Performed by: INTERNAL MEDICINE

## 2024-05-08 PROCEDURE — 93010 ELECTROCARDIOGRAM REPORT: CPT | Performed by: INTERNAL MEDICINE

## 2024-05-08 PROCEDURE — 33249 INSJ/RPLCMT DEFIB W/LEAD(S): CPT | Performed by: INTERNAL MEDICINE

## 2024-05-08 PROCEDURE — 99153 MOD SED SAME PHYS/QHP EA: CPT | Performed by: INTERNAL MEDICINE

## 2024-05-08 PROCEDURE — 272N000001 HC OR GENERAL SUPPLY STERILE: Performed by: INTERNAL MEDICINE

## 2024-05-08 DEVICE — ICD LEAD RLNC 4-FRONT DF4 SGL COIL 59CM L ACTFX 0672: Type: IMPLANTABLE DEVICE | Site: RIGHT VENTRICLE | Status: FUNCTIONAL

## 2024-05-08 DEVICE — ICD RESONATE HF DR DF4 D533: Type: IMPLANTABLE DEVICE | Site: CHEST | Status: FUNCTIONAL

## 2024-05-08 DEVICE — LEAD INGEVITY+ AF IS1 7841 52CM: Type: IMPLANTABLE DEVICE | Site: RIGHT ATRIUM | Status: FUNCTIONAL

## 2024-05-08 RX ORDER — FENTANYL CITRATE 50 UG/ML
INJECTION, SOLUTION INTRAMUSCULAR; INTRAVENOUS
Status: DISCONTINUED | OUTPATIENT
Start: 2024-05-08 | End: 2024-05-08 | Stop reason: HOSPADM

## 2024-05-08 RX ORDER — NALOXONE HYDROCHLORIDE 0.4 MG/ML
0.4 INJECTION, SOLUTION INTRAMUSCULAR; INTRAVENOUS; SUBCUTANEOUS
Status: DISCONTINUED | OUTPATIENT
Start: 2024-05-08 | End: 2024-05-08 | Stop reason: HOSPADM

## 2024-05-08 RX ORDER — LIDOCAINE HYDROCHLORIDE 20 MG/ML
INJECTION, SOLUTION INFILTRATION; PERINEURAL
Status: DISCONTINUED | OUTPATIENT
Start: 2024-05-08 | End: 2024-05-08 | Stop reason: HOSPADM

## 2024-05-08 RX ORDER — CEFAZOLIN SODIUM 2 G/100ML
2 INJECTION, SOLUTION INTRAVENOUS
Status: COMPLETED | OUTPATIENT
Start: 2024-05-08 | End: 2024-05-08

## 2024-05-08 RX ORDER — SODIUM CHLORIDE 9 MG/ML
INJECTION, SOLUTION INTRAVENOUS CONTINUOUS
Status: DISCONTINUED | OUTPATIENT
Start: 2024-05-08 | End: 2024-05-08 | Stop reason: HOSPADM

## 2024-05-08 RX ORDER — NALOXONE HYDROCHLORIDE 0.4 MG/ML
0.2 INJECTION, SOLUTION INTRAMUSCULAR; INTRAVENOUS; SUBCUTANEOUS
Status: DISCONTINUED | OUTPATIENT
Start: 2024-05-08 | End: 2024-05-08 | Stop reason: HOSPADM

## 2024-05-08 RX ORDER — CEPHALEXIN 500 MG/1
500 TABLET ORAL 3 TIMES DAILY
Qty: 15 TABLET | Refills: 0 | Status: SHIPPED | OUTPATIENT
Start: 2024-05-08 | End: 2024-05-13

## 2024-05-08 RX ORDER — LIDOCAINE 40 MG/G
CREAM TOPICAL
Status: DISCONTINUED | OUTPATIENT
Start: 2024-05-08 | End: 2024-05-08 | Stop reason: HOSPADM

## 2024-05-08 RX ADMIN — SODIUM CHLORIDE: 9 INJECTION, SOLUTION INTRAVENOUS at 09:58

## 2024-05-08 RX ADMIN — CEFAZOLIN SODIUM 2 G: 10 INJECTION, POWDER, FOR SOLUTION INTRAVENOUS at 10:33

## 2024-05-08 ASSESSMENT — ACTIVITIES OF DAILY LIVING (ADL)
ADLS_ACUITY_SCORE: 37

## 2024-05-08 NOTE — DISCHARGE INSTRUCTIONS
Home Care after an ICD implant  Wound care:  Keep your incision (surgery wound) dry for 3 days.  After 3 days, you may remove the outer bandage.  Keep the strips of tape on.  They will be removed at your clinic visit.  Check for signs of infection each day.  These include increased redness, swelling, drainage or a fever over 101 F (38.3 C).  Call us immediately if you see any of these signs.  If there are no signs of infection, you may shower in 3 days.  Do not submerge the incision (in a bath tub, hot tub, or swimming pool) until fully healed.  Pain:   You may have mild to moderate pain for 3 to 5 days.  Take acetaminophen (Tylenol) or ibuprofen (Advil) for the pain.  Call us if the pain is severe or lasts more than 5 days.  Activity:  You should slowly go back to your normal activities after 24 hours.  Healing will take 4 to 6 weeks.  No driving for 3 days  Avoid climbing a ladder alone.  It is best to stay within 4 feet of the ground.  Avoid anything that may cause rough contact or a hard hit to your chest.  This includes football, hockey, and other contact sports.  Do not go swimming or boating alone.    For at least 4 weeks:  Do not raise your affected arm above your shoulder.  Do not use your affected arm to push, pull, or lift anything over 10 pounds.  Avoid repetitive upper body activities for 6 weeks (ie: golf, swimming, and weight lifting)    Telling others about your device:  Before you have any medical tests or treatments, tell the doctors, dentists, and other care providers about your device.  There are a few tests and treatments that may interfere with your device.  (These include MRI, radiation therapy, electrocautery, and others.)  Your care team may need to take special steps to keep you safe.  Before you leave the hospital, you will receive a temporary ID card.  A permanent card will be mailed to you about 6 to 8 weeks later.  Always carry the ID card with you.  It has important details about  your device.  You should also get a MedicAlert ID.  Please ask us for a MedicAlert brochure, or go to [email or web address].  Safety near electrical equipment:  All of these are safe to use when in good repair:  Microwaves  Radios  Cordless phone  Remote controls  Small electrical tools  Cell phones: Keep cell phones at least 6 inches from your device.  Do not carry the phone in a pocket near your device.  Security srinivasan: It is okay to walk through security srinivasan at the airports and department stores.  Tell airport security that you have a defibrillator.  They should keep the screening wand at least 6 inches from your device.  Full-body scanners are safe.  Avoid the following:   MRI tests in the hospital unless you have a MRI safe defibrillator.   Arc welding, chain saws and high-powered industrial or commercial tools.   Power lines, power plants and large power generators.   Electric body fat scales.   Magnetic mattress pads or pillow.    What to do after a shock from your ICD:  Stop what you re doing and rest.  If you feel fine before and after the shock, call the device clinic.  If you feel unwell or receive more than one shock, call 911 or go to the emergency room.  A shock could mean that your condition has changed and you may need to see a doctor.    Follow-Up Visits:  Return to the clinic in 7 to 10 days to have your device and wound checked.    Device follow-up after your initial clinic visit will take place every 3 months and as needed until your battery reaches end of service. The device follow up will take place in person or remotely utilizing your home monitor.    Questions?  Please call [facility]   Device Nurse:          Business Hours:  [phone]    After Hours:  [phone]   Choose option 4, then ask for the on-call device nurse at job code [id number].    Your next device clinic appointment is scheduled on:    Thursday, May 16th, 2024 at 1:30 PM.                                                  [facility]  [facility] - [address]  [address] [place]  [address]

## 2024-05-08 NOTE — H&P
Electrophysiology Pre-Procedure History and Physical    Duane C Johnson MRN# [id number]   Age: 74 year old YOB: 1950      Date of Procedure: 5/8/24 [facility]      Date of Exam 5/8/2024 Facility Same day       HPI:  Duane C Johnson is a 74 year old male with past medical history significant for anterior STEMI s/p PCI to pLAD 10/26/23 with a VT/VF arrest the next day, repeat angiogram with patent stents. He was started on amiodarone and discharged 11/33/23. ICD was not indicated since VT/VF occurred within 48 hours of revascularization. Echo done prior to discharge with LVEF 20-30%, with a large area of akinesis in the LAD placed on apixaban due to this. He was referred to EP and seen by Dr Black 4/4/24. He had repeat echo done 2/19/24, LVEF remains 25-30%. He is on lisinopril and metoprolol, has had significant hypotension limiting further titration of GDMT. Discussed indication for primary prevention ICD, he was agreeable to proceeding.          Active problem list:     Patient Active Problem List    Diagnosis Date Noted    Ischemic cardiomyopathy 04/15/2024     Priority: Medium     25% EF      Anticoagulated 04/15/2024     Priority: Medium     Due to large akinetic area anterior heart       Left inguinal hernia 01/17/2024     Priority: Medium    Atherosclerosis of native coronary artery of native heart with angina pectoris (H24) 01/02/2024     Priority: Medium     anterior STEMI s/p PCI to the pLAD on 10/26/23 with a VT/VF arrest the next day (10/27) with patent stents and unchanged anatomy on repeat angiogram       Anxiety 11/13/2023     Priority: Medium    Systolic CHF (H) 11/07/2023     Priority: Medium    Prostate cancer (H) 09/23/2023     Priority: Medium    Hyperlipidemia LDL goal <130 01/26/2016     Priority: Medium            Medications (include herbals and vitamins):      No current facility-administered medications for this encounter.      Current Outpatient Medications   Medication Sig Dispense Refill    amiodarone (PACERONE) 200 MG tablet Take 1 tablet (200 mg) by mouth daily 90 tablet 3    apixaban ANTICOAGULANT (ELIQUIS) 5 MG tablet Take 1 tablet (5 mg) by mouth 2 times daily 180 tablet 3    atorvastatin (LIPITOR) 80 MG tablet Take 1 tablet (80 mg) by mouth daily 90 tablet 3    bumetanide (BUMEX) 1 MG tablet TAKE 1 TABLET BY MOUTH ONCE A DAY 30 tablet 3    cetirizine (ZYRTEC) 10 MG tablet Take 10 mg by mouth daily      clopidogrel (PLAVIX) 75 MG tablet Take 1 tablet (75 mg) by mouth daily 30 tablet 3    escitalopram (LEXAPRO) 10 MG tablet Take 10 mg by mouth daily      fish oil-omega-3 fatty acids 1000 MG capsule Take 1 g by mouth 2 times daily Also contains another supplement      lisinopril (ZESTRIL) 2.5 MG tablet Take 0.5 tablets (1.25 mg) by mouth daily HOLD for systolic blood pressure < 90 45 tablet 1    magnesium oxide (MAG-OX) 400 MG tablet Take 1 tablet (400 mg) by mouth daily 90 tablet 3    melatonin 5 MG tablet Take 1-2 tablets (5-10 mg) by mouth at bedtime 60 tablet 0    metoprolol succinate ER (TOPROL XL) 25 MG 24 hr tablet Take 0.5 tablets (12.5 mg) by mouth daily Hold for systolic blood pressure less than 90. 45 tablet 1    multivitamin w/minerals (THERA-VIT-M) tablet Take 1 tablet by mouth daily      potassium chloride ER (KLOR-CON M) 20 MEQ CR tablet Take 1 tablet (20 mEq) by mouth daily 30 tablet 5    vitamin C (ASCORBIC ACID) 1000 MG TABS Take 1,000 mg by mouth daily             Medication List         Notice    Cannot display discharge medications because the patient has not yet been admitted.              Allergies:      Allergies   Allergen Reactions    Brilinta [Ticagrelor] Other (See Comments) and Difficulty breathing     Per pt and spouse, hyperventilation.    Clonazepam Other (See Comments)     Per spouse, acted like a zombie and he was shaky, could barely talk.             Social History:     Social History      Tobacco Use    Smoking status: Former     Current packs/day: 0.00     Average packs/day: 0.3 packs/day for 30.0 years (7.5 ttl pk-yrs)     Types: Cigarettes     Start date: 10/26/1993     Quit date: 10/26/2023     Years since quittin.5     Passive exposure: Current    Smokeless tobacco: Never    Tobacco comments:     Quit 10/26/2023   Substance Use Topics    Alcohol use: Yes     Comment: 1-2 beers per day            Physical Exam:   All vitals have been reviewed    Airway assessment:   Patient is able to open mouth wide  Patient is able to stick out tongue}      ENT:   normocephalic, without obvious abnormality     Neck:   supple, symmetrical, trachea midline     Lungs:   No increased work of breathing, good air exchange, clear to auscultation bilaterally, no crackles or wheezing     Cardiovascular:   normal S1 and S2 and no edema             Lab / Radiology Results:     Lab Results   Component Value Date    WBC 4.6 2024    WBC 12.2 2021    RBC 3.68 2024    RBC 3.60 2021    HGB 11.2 2024    HGB 11.6 2024    HGB 12.0 2021    HCT 34.3 2024    HCT 34.8 2021    MCV 93 2024    MCV 97 2021    RDW 14.6 2024    RDW 13.2 2021     2024     2021      Lab Results   Component Value Date    WBC 4.6 2024    WBC 12.2 2021     Lab Results   Component Value Date     2024     2021     Lab Results   Component Value Date    HGB 11.2 2024    HGB 11.6 2024    HGB 12.0 2021    HCT 34.3 2024    HCT 34.8 2021     Lab Results   Component Value Date     2024     10/27/2023     2021    CO2 27 2024    CO2 27 10/27/2023    CO2 29 2021    BUN 22.5 2024    BUN 41 2021     Lab Results   Component Value Date     2024     10/27/2023     2021    CO2 27 2024    CO2 27 10/27/2023    CO2 29  05/26/2021    BUN 22.5 05/06/2024    BUN 41 05/26/2021     Lab Results   Component Value Date    TSH 5.24 12/30/2023            Plan:   Patient's active problems diagnostically and therapeutically optimized for the planned procedure. Patient here for ICD implant. We discussed with the patient the rationale for ICD placement, alternative therapies,  technical aspects of the surgical procedure, risks/benefits of therapy and need for long-term follow-up in the Device Clinic.  The risk of defibrillator placement include: over sedation, reaction to local anesthetic, reaction to narcotics or benzodiazipines used for moderate secation, localized bleeding, internal bleeding, collapsed lung, and acute or late infections. There is the possibilty of unforseen complications as well such as device or lead failure, lead dislodgement, and inappropriate shocks from the defibrillator. Patient states understanding and wishes to procced.       Hawa Keita PA-C  [facility]  Electrophysiology Consult Service  Pager: [phone]

## 2024-05-08 NOTE — PROGRESS NOTES
Duane has ambulated, voided, and tolerated a regular diet. He is alert and oriented x 4 and able to make needs known. Left upper chest incision is covered with bandage that is CDI. EKG and chest x-ray completed and resulted. Device nurse and device rep both saw patient and wife. Patient and wife have no questions. PIV removed. He was assisted to the main entrance via wheelchair with all belongings, wife picked up prescription from pharmacy. Patient discharged to home with wife.

## 2024-05-08 NOTE — PROGRESS NOTES
Pt prepped for ICD placement. PIV infusing fluids per MAR. Labs in process. Consent currently being signed. IV abx hung on IV pole but not infusing. No shaving of chest needed.

## 2024-05-08 NOTE — PROGRESS NOTES
Arrived to 3C post ICD. Awake and alert. Food and fluids provided. Family at bedside. No further needs.

## 2024-05-09 LAB
ATRIAL RATE - MUSE: 53 BPM
ATRIAL RATE - MUSE: 60 BPM
DIASTOLIC BLOOD PRESSURE - MUSE: NORMAL MMHG
DIASTOLIC BLOOD PRESSURE - MUSE: NORMAL MMHG
INTERPRETATION ECG - MUSE: NORMAL
INTERPRETATION ECG - MUSE: NORMAL
P AXIS - MUSE: -8 DEGREES
P AXIS - MUSE: 43 DEGREES
PR INTERVAL - MUSE: 238 MS
PR INTERVAL - MUSE: 302 MS
QRS DURATION - MUSE: 90 MS
QRS DURATION - MUSE: 94 MS
QT - MUSE: 486 MS
QT - MUSE: 552 MS
QTC - MUSE: 486 MS
QTC - MUSE: 517 MS
R AXIS - MUSE: -25 DEGREES
R AXIS - MUSE: 6 DEGREES
SYSTOLIC BLOOD PRESSURE - MUSE: NORMAL MMHG
SYSTOLIC BLOOD PRESSURE - MUSE: NORMAL MMHG
T AXIS - MUSE: -20 DEGREES
T AXIS - MUSE: 264 DEGREES
VENTRICULAR RATE- MUSE: 53 BPM
VENTRICULAR RATE- MUSE: 60 BPM

## 2024-05-10 ENCOUNTER — CARE COORDINATION (OUTPATIENT)
Dept: CARDIOLOGY | Facility: CLINIC | Age: 74
End: 2024-05-10
Payer: MEDICARE

## 2024-05-10 ENCOUNTER — HOSPITAL ENCOUNTER (EMERGENCY)
Facility: CLINIC | Age: 74
Discharge: HOME | End: 2024-05-10
Attending: STUDENT IN AN ORGANIZED HEALTH CARE EDUCATION/TRAINING PROGRAM | Admitting: STUDENT IN AN ORGANIZED HEALTH CARE EDUCATION/TRAINING PROGRAM
Payer: MEDICARE

## 2024-05-10 ENCOUNTER — ANCILLARY PROCEDURE (OUTPATIENT)
Dept: CARDIOLOGY | Facility: CLINIC | Age: 74
End: 2024-05-10
Attending: INTERNAL MEDICINE
Payer: MEDICARE

## 2024-05-10 VITALS
WEIGHT: 134.2 LBS | RESPIRATION RATE: 13 BRPM | HEIGHT: 66 IN | SYSTOLIC BLOOD PRESSURE: 115 MMHG | TEMPERATURE: 97.9 F | BODY MASS INDEX: 21.57 KG/M2 | DIASTOLIC BLOOD PRESSURE: 68 MMHG | HEART RATE: 60 BPM | OXYGEN SATURATION: 94 %

## 2024-05-10 DIAGNOSIS — I50.20 HEART FAILURE WITH REDUCED EJECTION FRACTION, NYHA CLASS I (H): ICD-10-CM

## 2024-05-10 DIAGNOSIS — Z95.810 ICD (IMPLANTABLE CARDIOVERTER-DEFIBRILLATOR) IN PLACE: ICD-10-CM

## 2024-05-10 DIAGNOSIS — I46.9 CARDIAC ARREST (H): ICD-10-CM

## 2024-05-10 DIAGNOSIS — I25.5 ISCHEMIC CARDIOMYOPATHY: ICD-10-CM

## 2024-05-10 DIAGNOSIS — Z71.1 FEARED COMPLAINT WITHOUT DIAGNOSIS: ICD-10-CM

## 2024-05-10 PROCEDURE — 99283 EMERGENCY DEPT VISIT LOW MDM: CPT | Performed by: STUDENT IN AN ORGANIZED HEALTH CARE EDUCATION/TRAINING PROGRAM

## 2024-05-10 PROCEDURE — 99283 EMERGENCY DEPT VISIT LOW MDM: CPT | Mod: 25 | Performed by: STUDENT IN AN ORGANIZED HEALTH CARE EDUCATION/TRAINING PROGRAM

## 2024-05-10 PROCEDURE — 93296 REM INTERROG EVL PM/IDS: CPT | Performed by: STUDENT IN AN ORGANIZED HEALTH CARE EDUCATION/TRAINING PROGRAM

## 2024-05-10 ASSESSMENT — COLUMBIA-SUICIDE SEVERITY RATING SCALE - C-SSRS
2. HAVE YOU ACTUALLY HAD ANY THOUGHTS OF KILLING YOURSELF IN THE PAST MONTH?: NO
6. HAVE YOU EVER DONE ANYTHING, STARTED TO DO ANYTHING, OR PREPARED TO DO ANYTHING TO END YOUR LIFE?: NO
1. IN THE PAST MONTH, HAVE YOU WISHED YOU WERE DEAD OR WISHED YOU COULD GO TO SLEEP AND NOT WAKE UP?: NO

## 2024-05-10 ASSESSMENT — ACTIVITIES OF DAILY LIVING (ADL)
ADLS_ACUITY_SCORE: 37
ADLS_ACUITY_SCORE: 37

## 2024-05-10 NOTE — PROGRESS NOTES
Called Duane to follow up on ER visit last night. Went to ER as BP was in 140s, typically has been low 100s or lower.     Per Duane, prior to this ER vist BP had been running 110-120, BP was up to 142 systolic last night.     Did update device nurse who advised can reassure patient he is not the first one that this has happened to where BP can come up a little after implant. Reviewed this with Duane.     Now this morning is back around 110 systolic.   Reviewed with him that he had previously been a lot lower than this. Has parameters to hold his metoprolol and lisinopril for SBP less than 90. Per his wife, he is able to take his metoprolol and lisinopril pretty much every day now.    Will update Dr. Cruz

## 2024-05-10 NOTE — ED TRIAGE NOTES
"Pt states he had a defibrillator placed Wednesday and was sleeping and \"something woke him up out of sleep to tell him to check his blood pressure\". When checking he noticed the systolic to be in the 140's and states his hands feel cold and just feels \"his blood pressure his high\".      Triage Assessment (Adult)       Row Name 05/10/24 0438          Triage Assessment    Airway WDL WDL        Respiratory WDL    Respiratory WDL WDL        Peripheral/Neurovascular WDL    Peripheral Neurovascular WDL WDL        Cognitive/Neuro/Behavioral WDL    Cognitive/Neuro/Behavioral WDL WDL                     "

## 2024-05-10 NOTE — DISCHARGE INSTRUCTIONS
Your blood pressure was normal here.  The interrogation of your device was reassuring.  Call your cardiologist later to discuss and see if they want to change any of your blood pressure medications.  Everything was reassuring here.  Return to the ER with new or worsening symptoms.

## 2024-05-10 NOTE — ED PROVIDER NOTES
"  History     Chief Complaint   Patient presents with    Hypertension     Pt states he had a defibrillator placed Wednesday and was sleeping and \"something woke him up out of sleep to tell him to check his blood pressure\". When checking he noticed the systolic to be in the 140's and states his hands feel cold and just feels \"his blood pressure his high\".      HPI  Duane C Johnson is a 74 year old male who is a history of coronary artery disease with history of STEMI status post PCI and V-fib arrest back in October 2023, 2 days status post AICD placement, history of CHF, hyperlipidemia, history of prostate cancer, who presents to the emergency department for evaluation of his blood pressure.  Patient states that when he woke up this morning he felt \"off\" and something was telling him he needed to check his blood pressure.  He noted his systolic to be in the 140s and he stated that this was quite high for him and he became concerned.  He states normally his blood pressure runs in the low 100s.  He was worried so he came into the ER for evaluation.  He currently denies having any complaints.  States he feels normal.  Denies chest pain or trouble breathing.  No headache.  No vision issues.  He denies feeling any shocks from his AICD.  He has been taking all medications as prescribed.    Allergies:  Allergies   Allergen Reactions    Brilinta [Ticagrelor] Other (See Comments) and Difficulty breathing     Per pt and spouse, hyperventilation.    Clonazepam Other (See Comments)     Per spouse, acted like a zombie and he was shaky, could barely talk.       Problem List:    Patient Active Problem List    Diagnosis Date Noted    Ischemic cardiomyopathy 04/15/2024     Priority: Medium     25% EF      Anticoagulated 04/15/2024     Priority: Medium     Due to large akinetic area anterior heart       Left inguinal hernia 01/17/2024     Priority: Medium    Atherosclerosis of native coronary artery of native heart with angina " pectoris (H24) 01/02/2024     Priority: Medium     anterior STEMI s/p PCI to the pLAD on 10/26/23 with a VT/VF arrest the next day (10/27) with patent stents and unchanged anatomy on repeat angiogram       Anxiety 11/13/2023     Priority: Medium    Systolic CHF (H) 11/07/2023     Priority: Medium    Prostate cancer (H) 09/23/2023     Priority: Medium    Hyperlipidemia LDL goal <130 01/26/2016     Priority: Medium        Past Medical History:    Past Medical History:   Diagnosis Date    Benign essential hypertension     CAD (coronary artery disease)     Chronic systolic heart failure (H)     Hypertension     ST elevation myocardial infarction (STEMI), unspecified artery (H)     Ventricular fibrillation (H)        Past Surgical History:    Past Surgical History:   Procedure Laterality Date    COLONOSCOPY N/A 3/23/2023    Procedure: COLONOSCOPY, FLEXIBLE, WITH LESION REMOVAL USING SNARE;  Surgeon: Jose Faustin MD;  Location: [facility]    CV CENTRAL VENOUS CATHETER PLACEMENT N/A 10/26/2023    Procedure: Central Venous Catheter Placement;  Surgeon: Rob Lyles MD;  Location:  HEART CARDIAC CATH LAB    CV CORONARY ANGIOGRAM N/A 10/27/2023    Procedure: Coronary Angiogram;  Surgeon: Rob Lyles MD;  Location:  HEART CARDIAC CATH LAB    CV CORONARY ANGIOGRAM N/A 10/26/2023    Procedure: Coronary Angiogram;  Surgeon: Rob Lyles MD;  Location:  HEART CARDIAC CATH LAB    CV LEFT HEART CATH N/A 10/26/2023    Procedure: Left Heart Catheterization;  Surgeon: Rob Lyles MD;  Location:  HEART CARDIAC CATH LAB    CV PCI N/A 10/27/2023    Procedure: Percutaneous Coronary Intervention;  Surgeon: Rob Lyles MD;  Location: [facility] CARDIAC CATH LAB    CV PCI STENT DRUG ELUTING N/A 10/26/2023    Procedure: Percutaneous Coronary Intervention Stent;  Surgeon: Rob Lyles MD;  Location: [facility] CARDIAC CATH LAB    CV RIGHT HEART CATH MEASUREMENTS RECORDED  N/A 2024    Procedure: Heart Cath Right Heart Cath;  Surgeon: Rob Lyles MD;  Location:  HEART CARDIAC CATH LAB    EP ICD INSERT SINGLE N/A 2024    Procedure: Implantable Cardioverter Defibrillator Device & Lead Implant Dual;  Surgeon: Guero Black MD;  Location:  HEART CARDIAC CATH LAB    INJECTION, HYDROGEL SPACER N/A 2024    Procedure: INJECTION, HYDROGEL SPACER AND FIDUCIAL MARKER PLACEMENT;  Surgeon: Stanislaw Barros MD;  Location: [facility]       Family History:    Family History   Problem Relation Age of Onset    Other Cancer Mother     Obesity Mother     GI problems Father     Uterine Cancer Sister        Social History:  Marital Status:   [2]  Social History     Tobacco Use    Smoking status: Former     Current packs/day: 0.00     Average packs/day: 0.3 packs/day for 30.0 years (7.5 ttl pk-yrs)     Types: Cigarettes     Start date: 10/26/1993     Quit date: 10/26/2023     Years since quittin.5     Passive exposure: Current    Smokeless tobacco: Never    Tobacco comments:     Quit 10/26/2023   Vaping Use    Vaping status: Never Used   Substance Use Topics    Alcohol use: Yes     Comment: 1-2 beers per day    Drug use: No        Medications:    amiodarone (PACERONE) 200 MG tablet  apixaban ANTICOAGULANT (ELIQUIS) 5 MG tablet  atorvastatin (LIPITOR) 80 MG tablet  bumetanide (BUMEX) 1 MG tablet  cephalexin 500 MG tablet  cetirizine (ZYRTEC) 10 MG tablet  clopidogrel (PLAVIX) 75 MG tablet  escitalopram (LEXAPRO) 10 MG tablet  fish oil-omega-3 fatty acids 1000 MG capsule  lisinopril (ZESTRIL) 2.5 MG tablet  magnesium oxide (MAG-OX) 400 MG tablet  melatonin 5 MG tablet  metoprolol succinate ER (TOPROL XL) 25 MG 24 hr tablet  multivitamin w/minerals (THERA-VIT-M) tablet  potassium chloride ER (KLOR-CON M) 20 MEQ CR tablet  vitamin C (ASCORBIC ACID) 1000 MG TABS          Review of Systems  See HPI  Physical Exam   BP: 126/88  Pulse: 61  Temp: 97.9  F (36.6  C)  Resp:  "16  Height: 167.6 cm (5' 6\")  Weight: 60.9 kg (134 lb 3.2 oz)  SpO2: 98 %      Physical Exam  /88   Pulse 60   Temp 97.9  F (36.6  C) (Oral)   Resp 15   Ht 1.676 m (5' 6\")   Wt 60.9 kg (134 lb 3.2 oz)   SpO2 97%   BMI 21.66 kg/m    General: alert, interactive, in no apparent distress  Head: atraumatic  Nose: no rhinorrhea or epistaxis  Ears: no external auditory canal discharge or bleeding.    Eyes: Sclera nonicteric. Conjunctiva noninjected. PERRL, EOMI  Mouth: no tonsillar erythema, edema, or exudate.  Moist mucous membranes  Neck: supple, moving spontaneously no midline cervical tenderness  Lungs: No increased work of breathing.  Clear to auscultation bilaterally.  CV: RRR, peripheral pulses palpable and symmetric.  Pacemaker noted in the left upper chest.  Incision is clean and dry and appears to be healing well  Abdomen: soft, nt, nd, no guarding or rebound.   Extremities: Warm and well-perfused.  No edema or calf tenderness bilaterally.  Skin: no rash or diaphoresis  Neuro: CN II-XII grossly intact, strength 5/5 in UE and LEs bilaterally, sensation intact to light touch in UE and LEs bilaterally;     ED Course        Procedures             Critical Care time:  none             No results found for this or any previous visit (from the past 24 hour(s)).    Medications - No data to display    Assessments & Plan (with Medical Decision Making)     I have reviewed the nursing notes.    I have reviewed the findings, diagnosis, plan and need for follow up with the patient.          Medical Decision Making  Duane C Johnson is a 74 year old male who is a history of coronary artery disease with history of STEMI status post PCI and V-fib arrest back in October 2023, 2 days status post AICD placement, history of CHF, hyperlipidemia, history of prostate cancer, who presents to the emergency department for evaluation of his blood pressure.  Vital signs reviewed and are reassuring here.  Blood pressure is " reassuring and normal here.  He is concerned about systolic blood pressure in the 140s at home.  He is asymptomatic at this time and not having any symptoms.  Doubt this has any significance with his recent AICD placement.  Will interrogated to ensure he did not receive any shocks or any other concerning findings.    Patient's device was interrogated and there were no shocks or other concerning findings.  Patient's blood pressure remains within normal limits.  He still does not have any complaints.  I provided the patient reassurance that I do not think his elevated blood pressure at home is anything that he needs to worry about.  He can call his regular doctor or cardiologist to discuss and see if there is any medication changes they want to make.  He is in agreement with this plan.  Return precautions were discussed.        New Prescriptions    No medications on file       Final diagnoses:   Feared complaint without diagnosis       5/10/2024   [facility] EMERGENCY DEPT       Vincent Ramos MD  05/10/24 0614

## 2024-05-13 ENCOUNTER — HOSPITAL ENCOUNTER (EMERGENCY)
Facility: CLINIC | Age: 74
Discharge: HOME | End: 2024-05-13
Attending: FAMILY MEDICINE | Admitting: FAMILY MEDICINE
Payer: MEDICARE

## 2024-05-13 ENCOUNTER — TELEPHONE (OUTPATIENT)
Dept: CARDIOLOGY | Facility: CLINIC | Age: 74
End: 2024-05-13
Payer: MEDICARE

## 2024-05-13 VITALS
HEART RATE: 60 BPM | WEIGHT: 132.7 LBS | HEIGHT: 66 IN | DIASTOLIC BLOOD PRESSURE: 52 MMHG | SYSTOLIC BLOOD PRESSURE: 83 MMHG | BODY MASS INDEX: 21.33 KG/M2 | RESPIRATION RATE: 16 BRPM | TEMPERATURE: 97 F | OXYGEN SATURATION: 97 %

## 2024-05-13 DIAGNOSIS — T14.8XXA HEMATOMA: ICD-10-CM

## 2024-05-13 PROCEDURE — 99283 EMERGENCY DEPT VISIT LOW MDM: CPT | Performed by: FAMILY MEDICINE

## 2024-05-13 RX ORDER — CEPHALEXIN 500 MG/1
500 CAPSULE ORAL 2 TIMES DAILY
Qty: 10 CAPSULE | Refills: 0 | Status: SHIPPED | OUTPATIENT
Start: 2024-05-13 | End: 2024-05-18

## 2024-05-13 ASSESSMENT — COLUMBIA-SUICIDE SEVERITY RATING SCALE - C-SSRS
6. HAVE YOU EVER DONE ANYTHING, STARTED TO DO ANYTHING, OR PREPARED TO DO ANYTHING TO END YOUR LIFE?: NO
1. IN THE PAST MONTH, HAVE YOU WISHED YOU WERE DEAD OR WISHED YOU COULD GO TO SLEEP AND NOT WAKE UP?: NO
2. HAVE YOU ACTUALLY HAD ANY THOUGHTS OF KILLING YOURSELF IN THE PAST MONTH?: NO

## 2024-05-13 ASSESSMENT — ACTIVITIES OF DAILY LIVING (ADL): ADLS_ACUITY_SCORE: 37

## 2024-05-13 NOTE — ED TRIAGE NOTES
Pt had defibrillator implanted last Wednesday.  States he is concerned for infection now, has swelling near site. Redness and warmth.       Triage Assessment (Adult)       Row Name 05/13/24 1014          Triage Assessment    Airway WDL WDL        Respiratory WDL    Respiratory WDL WDL        Skin Circulation/Temperature WDL    Skin Circulation/Temperature WDL WDL        Cardiac WDL    Cardiac WDL WDL        Peripheral/Neurovascular WDL    Peripheral Neurovascular WDL WDL        Cognitive/Neuro/Behavioral WDL    Cognitive/Neuro/Behavioral WDL WDL

## 2024-05-13 NOTE — TELEPHONE ENCOUNTER
Patient called reporting his incision area for his implanted [employer] ICD is red, hot to the touch and swollen. [facility] to have the incision looked at. Patient prefers to go to [place] and reports he is headed that way now.

## 2024-05-13 NOTE — DISCHARGE INSTRUCTIONS
RETURN TO THE EMERGENCY ROOM FOR THE FOLLOWING:    Severely worsened pain as discussed, increasing pain with fever, increasing local tenderness/redness/swelling, or at anytime for any concern.    FOLLOW UP:    With your surgeon as scheduled.    TREATMENT RECOMMENDATIONS:    Keflex 500 mg twice a day for 5 more days.  Prescription sent to the pharmacy.    NURSE ADVICE LINE:  [phone] or [phone]

## 2024-05-13 NOTE — ED PROVIDER NOTES
HPI   Patient is a 74-year-old male presenting with concern for swelling at the site of his ICD placement.  Per my chart review, the patient had an ICD placement performed on 5/8, 5 days ago.  He has a known history of ischemic cardiomyopathy, systolic heart failure, atrial fibrillation.  He takes amiodarone, Eliquis, Lipitor, Bumex, lisinopril, Toprol.  No recent alcohol.  He has been using Keflex twice a day over the past 5 days.    The patient awoke this morning with new pressure and obvious swelling at the site of his ICD placement in the left upper chest wall.  No increasing pain or tenderness.  No fever or systemic illness.  No drainage from the surgical site.  Eating and drinking normally.  No shortness of breath.  No peripheral edema or abdominal distention/swelling.      Allergies:  Allergies   Allergen Reactions    Brilinta [Ticagrelor] Other (See Comments) and Difficulty breathing     Per pt and spouse, hyperventilation.    Clonazepam Other (See Comments)     Per spouse, acted like a zombie and he was shaky, could barely talk.     Problem List:    Patient Active Problem List    Diagnosis Date Noted    Ischemic cardiomyopathy 04/15/2024     Priority: Medium     25% EF      Anticoagulated 04/15/2024     Priority: Medium     Due to large akinetic area anterior heart       Left inguinal hernia 01/17/2024     Priority: Medium    Atherosclerosis of native coronary artery of native heart with angina pectoris (H24) 01/02/2024     Priority: Medium     anterior STEMI s/p PCI to the pLAD on 10/26/23 with a VT/VF arrest the next day (10/27) with patent stents and unchanged anatomy on repeat angiogram       Anxiety 11/13/2023     Priority: Medium    Systolic CHF (H) 11/07/2023     Priority: Medium    Prostate cancer (H) 09/23/2023     Priority: Medium    Hyperlipidemia LDL goal <130 01/26/2016     Priority: Medium      Past Medical History:    Past Medical History:   Diagnosis Date    Benign essential hypertension      CAD (coronary artery disease)     Chronic systolic heart failure (H)     Hypertension     ST elevation myocardial infarction (STEMI), unspecified artery (H)     Ventricular fibrillation (H)      Past Surgical History:    Past Surgical History:   Procedure Laterality Date    COLONOSCOPY N/A 3/23/2023    Procedure: COLONOSCOPY, FLEXIBLE, WITH LESION REMOVAL USING SNARE;  Surgeon: Jose Faustin MD;  Location: [facility]    CV CENTRAL VENOUS CATHETER PLACEMENT N/A 10/26/2023    Procedure: Central Venous Catheter Placement;  Surgeon: Rob Lyles MD;  Location: [facility] CARDIAC CATH LAB    CV CORONARY ANGIOGRAM N/A 10/27/2023    Procedure: Coronary Angiogram;  Surgeon: Rob Lyles MD;  Location: [facility] CARDIAC CATH LAB    CV CORONARY ANGIOGRAM N/A 10/26/2023    Procedure: Coronary Angiogram;  Surgeon: Rob Lyles MD;  Location: [facility] CARDIAC CATH LAB    CV LEFT HEART CATH N/A 10/26/2023    Procedure: Left Heart Catheterization;  Surgeon: Rob Lyles MD;  Location: [facility] CARDIAC CATH LAB    CV PCI N/A 10/27/2023    Procedure: Percutaneous Coronary Intervention;  Surgeon: Rob Lyles MD;  Location: [facility] CARDIAC CATH LAB    CV PCI STENT DRUG ELUTING N/A 10/26/2023    Procedure: Percutaneous Coronary Intervention Stent;  Surgeon: Rob Lyles MD;  Location: [facility] CARDIAC CATH LAB    CV RIGHT HEART CATH MEASUREMENTS RECORDED N/A 5/6/2024    Procedure: Heart Cath Right Heart Cath;  Surgeon: Rob Lyles MD;  Location: [facility] CARDIAC CATH LAB    EP ICD INSERT SINGLE N/A 5/8/2024    Procedure: Implantable Cardioverter Defibrillator Device & Lead Implant Dual;  Surgeon: Guero Black MD;  Location: [facility] CARDIAC CATH LAB    INJECTION, HYDROGEL SPACER N/A 4/22/2024    Procedure: INJECTION, HYDROGEL SPACER AND FIDUCIAL MARKER PLACEMENT;  Surgeon: Stanislaw Barros MD;  Location: [facility]     Family History:    Family History  "  Problem Relation Age of Onset    Other Cancer Mother     Obesity Mother     GI problems Father     Uterine Cancer Sister      Social History:  Marital Status:   [2]  Social History     Tobacco Use    Smoking status: Former     Current packs/day: 0.00     Average packs/day: 0.3 packs/day for 30.0 years (7.5 ttl pk-yrs)     Types: Cigarettes     Start date: 10/26/1993     Quit date: 10/26/2023     Years since quittin.5     Passive exposure: Current    Smokeless tobacco: Never    Tobacco comments:     Quit 10/26/2023   Vaping Use    Vaping status: Never Used   Substance Use Topics    Alcohol use: Yes     Comment: 1-2 beers per day    Drug use: No      Medications:    cephALEXin (KEFLEX) 500 MG capsule  amiodarone (PACERONE) 200 MG tablet  apixaban ANTICOAGULANT (ELIQUIS) 5 MG tablet  atorvastatin (LIPITOR) 80 MG tablet  bumetanide (BUMEX) 1 MG tablet  cetirizine (ZYRTEC) 10 MG tablet  clopidogrel (PLAVIX) 75 MG tablet  escitalopram (LEXAPRO) 10 MG tablet  fish oil-omega-3 fatty acids 1000 MG capsule  lisinopril (ZESTRIL) 2.5 MG tablet  magnesium oxide (MAG-OX) 400 MG tablet  melatonin 5 MG tablet  metoprolol succinate ER (TOPROL XL) 25 MG 24 hr tablet  multivitamin w/minerals (THERA-VIT-M) tablet  potassium chloride ER (KLOR-CON M) 20 MEQ CR tablet  vitamin C (ASCORBIC ACID) 1000 MG TABS      Review of Systems   All other systems reviewed and are negative.      PE   BP: (!) 83/52  Pulse: 60  Temp: 97  F (36.1  C)  Resp: 16  Height: 167.6 cm (5' 6\")  Weight: 60.2 kg (132 lb 11.2 oz)  SpO2: 97 %  Physical Exam  Vitals and nursing note reviewed.   Constitutional:       General: He is not in acute distress.  HENT:      Head: Atraumatic.      Right Ear: External ear normal.      Left Ear: External ear normal.      Nose: Nose normal.      Mouth/Throat:      Mouth: Mucous membranes are moist.      Pharynx: Oropharynx is clear.   Eyes:      General: No scleral icterus.     Extraocular Movements: Extraocular " movements intact.      Conjunctiva/sclera: Conjunctivae normal.      Pupils: Pupils are equal, round, and reactive to light.   Cardiovascular:      Rate and Rhythm: Normal rate.   Pulmonary:      Effort: Pulmonary effort is normal. No respiratory distress.   Musculoskeletal:         General: Normal range of motion.      Cervical back: Normal range of motion.   Skin:     General: Skin is warm and dry.      Comments: His ICD site is swollen.  It is minimally warm to the touch compared to surrounding skin.  No local redness.  No local drainage.  The incision wound is intact and without dehiscence.   Neurological:      Mental Status: He is alert and oriented to person, place, and time.   Psychiatric:         Behavior: Behavior normal.         ED COURSE and MDM   1134.  The patient has local swelling at the ICD site.  I suspect this is a hematoma with bleeding into the surgical space.  Low concern for complicating infection as he is without local tenderness, redness of the skin, increasing pain, fever or other systemic symptoms.  He does not have tachycardia.  His blood pressure on arrival is in question but I think this is in context with his other medical problems and not related to septic shock.  No further workup at this time.  The patient is animated and asking many questions.  I am reassuring him on most of these.  I did suggest that his healing time may be prolonged given the local bleeding.  Because of his concern for infection and because of his increased risk of infection given the delayed healing time I will provide another 5 days of Keflex.    Electronic medical chart reviewed, including medical problems, medications, medical allergies, social history.  Recent hospitalizations and surgical procedures reviewed.  Recent clinic visits and consultations reviewed.  Recent labs and test results reviewed.  Nursing notes reviewed.    The patient, their parent if applicable, and/or their medical decision maker(s) and  I have reviewed all of the available historical information, applicable PMH, physical exam findings, and objective diagnostic data gathered during this ED visit.  We then discussed all work-up options and then together agreed upon the course taken during this visit.  The ultimate disposition and plan was a cooperative decision made between myself and the patient, their parent if applicable, and/or their legal decision maker(s).  The risks and benefits of all decisions made during this visit were discussed to the best of my abilities given the circumstances, and all parties are understanding of the pertinent ramifications of these decisions.      LABS  Labs Ordered and Resulted from Time of ED Arrival to Time of ED Departure - No data to display    IMAGING  Images reviewed by me.  Radiology report also reviewed.  No orders to display       Procedures    Medications - No data to display      IMPRESSION       ICD-10-CM    1. Hematoma  T14.8XXA     postoperative hematoma at the surgical site               Medication List        Started      cephALEXin 500 MG capsule  Commonly known as: KEFLEX  500 mg, Oral, 2 TIMES DAILY  Replaces: cephalexin 500 MG tablet            Discontinued      cephalexin 500 MG tablet  Replaced by: cephALEXin 500 MG capsule                                  Rowdy Olvera MD  05/13/24 1136

## 2024-05-14 ENCOUNTER — APPOINTMENT (OUTPATIENT)
Dept: RADIATION THERAPY | Facility: OUTPATIENT CENTER | Age: 74
End: 2024-05-14
Payer: MEDICARE

## 2024-05-14 ENCOUNTER — PATIENT OUTREACH (OUTPATIENT)
Dept: CARE COORDINATION | Facility: CLINIC | Age: 74
End: 2024-05-14
Payer: MEDICARE

## 2024-05-14 ENCOUNTER — TELEPHONE (OUTPATIENT)
Dept: CARDIOLOGY | Facility: CLINIC | Age: 74
End: 2024-05-14
Payer: MEDICARE

## 2024-05-14 NOTE — TELEPHONE ENCOUNTER
Pt called again today reporting 'the bump is getting bigger'. Yesterday he was encouraged to come to the [facility] device clinic for evaluation of the site. He declined, instead going to the [facility] ER. Pt currently has daily radiation treatments at [facility]. ER note denies redness and drainage. Pt states this is still true today, but that it's bigger than yesterday. He will come to clinic 5/15/24 at 1400 for hematoma assessment. Pt was instructed to hold his apixaban until this is evaluated tomorrow, per Lu Gonzalez NP's orders. Pt and wife verbalized understanding.

## 2024-05-14 NOTE — PROGRESS NOTES
[facility] Care Resource [place]  Community Health Worker Initial Outreach    CHW Initial Information Gathering:  Referral Source: ED Follow-Up  CHW Additional Questions  If ED/Hospital discharge, follow-up appointment scheduled as recommended?: Other (Pt has specialist follow-up tomorrow)  Patient agreeable to assistance with scheduling?: No  Patient declined (specify): Patient declined to schedule follow-up with PCP at this time.  MyChart active?: Yes    Patient accepts CC: No, patient declined at this time. Patient will be sent Care Coordination introduction letter for future reference.       Nellie Mariscal  Community Health Worker  Connected Care Resource [place], [facility]    *Connected Care Resource Team does NOT follow patient ongoing. Referrals are identified based on internal discharge reports and the outreach is to ensure patient has an understanding of their discharge instructions.

## 2024-05-14 NOTE — LETTER
Duane C Johnson  [id number] [address] [place] [id number]    Dear Duane C Johnson,      I am a team member within the [facility] Care Resource Center with [facility]. I recently contacted you to ensure you are doing well following a visit within our health system. I also wanted to take this chance to introduce Clinic Care Coordination should you have any interest in this program in the future.    Below is a description of Clinic Care Coordination and how this team can further assist you:       The Clinic Care Coordination team is made up of a Registered Nurse, , Financial Resource Worker, and a Community Health Worker who understand and can help navigate the health care system. The goal of clinic care coordination is to help you manage your health, improve access to care, and achieve optimal health outcomes. They work alongside your provider to assist you in determining your health and social needs, obtain health care and community resources, and provide you with necessary information and education. Clinic Care Coordination can work with you through any barriers and develop a care plan that helps coordinate and strengthen the relationship between you and your care team.    If you wish to connect with the Clinic Care Coordination Team, please let your [facility] Primary Care Provider or Clinic Care Team know and they can place a referral. The Clinic Care Coordination team will then reach out by phone to further support you.    We are focused on providing you with the highest-quality healthcare experience possible.    Sincerely,   [facility]

## 2024-05-15 ENCOUNTER — APPOINTMENT (OUTPATIENT)
Dept: RADIATION THERAPY | Facility: OUTPATIENT CENTER | Age: 74
End: 2024-05-15
Payer: MEDICARE

## 2024-05-15 ENCOUNTER — ANCILLARY PROCEDURE (OUTPATIENT)
Dept: CARDIOLOGY | Facility: CLINIC | Age: 74
End: 2024-05-15
Attending: INTERNAL MEDICINE
Payer: MEDICARE

## 2024-05-15 ENCOUNTER — OFFICE VISIT (OUTPATIENT)
Dept: RADIATION THERAPY | Facility: OUTPATIENT CENTER | Age: 74
End: 2024-05-15
Payer: MEDICARE

## 2024-05-15 VITALS
WEIGHT: 139.4 LBS | SYSTOLIC BLOOD PRESSURE: 94 MMHG | DIASTOLIC BLOOD PRESSURE: 56 MMHG | BODY MASS INDEX: 22.5 KG/M2 | HEART RATE: 60 BPM

## 2024-05-15 DIAGNOSIS — I50.20 HEART FAILURE WITH REDUCED EJECTION FRACTION, NYHA CLASS I (H): ICD-10-CM

## 2024-05-15 DIAGNOSIS — Z95.810 ICD (IMPLANTABLE CARDIOVERTER-DEFIBRILLATOR) IN PLACE: ICD-10-CM

## 2024-05-15 DIAGNOSIS — I25.5 ISCHEMIC CARDIOMYOPATHY: ICD-10-CM

## 2024-05-15 DIAGNOSIS — I46.9 CARDIAC ARREST (H): ICD-10-CM

## 2024-05-15 DIAGNOSIS — C61 PROSTATE CANCER (H): Primary | ICD-10-CM

## 2024-05-15 PROCEDURE — 93283 PRGRMG EVAL IMPLANTABLE DFB: CPT | Performed by: INTERNAL MEDICINE

## 2024-05-15 NOTE — PROGRESS NOTES
[facility]  SPECIALIZING IN BREAKTHROUGHS  Radiation Oncology    On Treatment Visit Note      Duane C Johnson      Date: 5/15/2024   MRN: [id number]   : 1950  Diagnosis: prostate cancer      Reason for Visit:  On Radiation Treatment Visit     Treatment Summary to Date  Treatment Site: pelvis Current Dose: 500/7000 cGy Fractions:       Chemotherapy  Chemo concurrent with radx?: No    Subjective:   Doing okay with respect to radiation treatment thus far.  No  bother.  No  bother.  Energy is okay.      Will meet cardiac team today due to noted swelling/hematoma at defibrillator placement site.    Nursing ROS:   Nutrition Alteration  Diet Type: Patient's Preference  Skin  Skin Reaction: 0 - No changes     ENT and Mouth Exam  ENT/Mouth Note: no oral issues  Cardiovascular  Cardio/Resp Note: ? infected defibrillator?  f/u today with cardiology  Gastrointestinal  GI Note: bowels regular, no issues  Genitourinary   Note: no gu concerns,  rare nocturia     Pain Assessment  Pain Note: no  pain issues      Objective:   BP 94/56   Pulse 60   Wt 63.2 kg (139 lb 6.4 oz)   BMI 22.50 kg/m    No apparent distress    Labs:  CBC RESULTS:   Recent Labs   Lab Test 24  0909   WBC 5.6   RBC 3.71*   HGB 11.4*   HCT 34.4*   MCV 93   MCH 30.7   MCHC 33.1   RDW 14.6        ELECTROLYTES:  Recent Labs   Lab Test 24  0909   *   POTASSIUM 4.9   CHLORIDE 100   PRANAV 9.6   CO2 23   BUN 28.8*   CR 0.93   GLC 91       Assessment:  Mr. Aguila is a 74 year old male with significant cardiovascular disease diagnosed with [place] 4+3, PSA 4.61 unfavorable intermediate risk prostate cancer. He is undergoing definitive radiation therapy.    Tolerating radiation therapy well.  All questions and concerns addressed.    Plan:   Continue current therapy.        Mosaiq chart and setup information reviewed  Ports checked    Medication Review  Med list reviewed with patient?: Yes  Med Note: Lupron # 1 6 month  dose, given 3/19/2024           Jasper Vallejo MD

## 2024-05-15 NOTE — LETTER
5/15/2024         RE: Duane C Johnson  [id number] [address] [place] [address]        Dear Colleague,    Thank you for referring your patient, Duane C Johnson, to the  [facility]. Please see a copy of my visit note below.    [facility]  SPECIALIZING IN BREAKTHROUGHS  Radiation Oncology    On Treatment Visit Note      Duane C Johnson      Date: 5/15/2024   MRN: [id number]   : 1950  Diagnosis: prostate cancer      Reason for Visit:  On Radiation Treatment Visit     Treatment Summary to Date  Treatment Site: pelvis Current Dose: 500/7000 cGy Fractions:       Chemotherapy  Chemo concurrent with radx?: No    Subjective:   Doing okay with respect to radiation treatment thus far.  No  bother.  No  bother.  Energy is okay.      Will meet cardiac team today due to noted swelling/hematoma at defibrillator placement site.    Nursing ROS:   Nutrition Alteration  Diet Type: Patient's Preference  Skin  Skin Reaction: 0 - No changes     ENT and Mouth Exam  ENT/Mouth Note: no oral issues  Cardiovascular  Cardio/Resp Note: ? infected defibrillator?  f/u today with cardiology  Gastrointestinal  GI Note: bowels regular, no issues  Genitourinary   Note: no gu concerns,  rare nocturia     Pain Assessment  Pain Note: no  pain issues      Objective:   BP 94/56   Pulse 60   Wt 63.2 kg (139 lb 6.4 oz)   BMI 22.50 kg/m    No apparent distress    Labs:  CBC RESULTS:   Recent Labs   Lab Test 24  0909   WBC 5.6   RBC 3.71*   HGB 11.4*   HCT 34.4*   MCV 93   MCH 30.7   MCHC 33.1   RDW 14.6        ELECTROLYTES:  Recent Labs   Lab Test 24  0909   *   POTASSIUM 4.9   CHLORIDE 100   PRANAV 9.6   CO2 23   BUN 28.8*   CR 0.93   GLC 91       Assessment:  Mr. Aguila is a 74 year old male with significant cardiovascular disease diagnosed with [place] 4+3, PSA 4.61 unfavorable intermediate risk prostate cancer. He is undergoing definitive radiation therapy.    Tolerating  radiation therapy well.  All questions and concerns addressed.    Plan:   Continue current therapy.        Mosaiq chart and setup information reviewed  Ports checked    Medication Review  Med list reviewed with patient?: Yes  Med Note: Lupron # 1 6 month dose, given 3/19/2024           Jasper Vallejo MD

## 2024-05-15 NOTE — PATIENT INSTRUCTIONS
It was a pleasure to see you in clinic today, please do not hesitate to call us for questions or concerns.  Thank you for coming to clinic today, we look forward to seeing you back in clinic on 5/21/24 at 11:30 AM for an incision re-check.    Lu Conteh RN    Electrophysiology Nurse Clinician  [facility]    During Business Hours Please Call:  [phone]  After Hours Please Call:  [phone] - select option #[phone] and ask for job code [phone]

## 2024-05-16 ENCOUNTER — APPOINTMENT (OUTPATIENT)
Dept: RADIATION THERAPY | Facility: OUTPATIENT CENTER | Age: 74
End: 2024-05-16
Payer: MEDICARE

## 2024-05-16 LAB
MDC_IDC_LEAD_CONNECTION_STATUS: NORMAL
MDC_IDC_LEAD_CONNECTION_STATUS: NORMAL
MDC_IDC_LEAD_IMPLANT_DT: NORMAL
MDC_IDC_LEAD_IMPLANT_DT: NORMAL
MDC_IDC_LEAD_LOCATION: NORMAL
MDC_IDC_LEAD_LOCATION: NORMAL
MDC_IDC_LEAD_LOCATION_DETAIL_1: NORMAL
MDC_IDC_LEAD_LOCATION_DETAIL_1: NORMAL
MDC_IDC_LEAD_MFG: NORMAL
MDC_IDC_LEAD_MFG: NORMAL
MDC_IDC_LEAD_MODEL: NORMAL
MDC_IDC_LEAD_MODEL: NORMAL
MDC_IDC_LEAD_POLARITY_TYPE: NORMAL
MDC_IDC_LEAD_POLARITY_TYPE: NORMAL
MDC_IDC_LEAD_SERIAL: NORMAL
MDC_IDC_LEAD_SERIAL: NORMAL
MDC_IDC_LEAD_SPECIAL_FUNCTION: NORMAL
MDC_IDC_LEAD_SPECIAL_FUNCTION: NORMAL
MDC_IDC_MSMT_BATTERY_DTM: NORMAL
MDC_IDC_MSMT_BATTERY_REMAINING_LONGEVITY: 156 MO
MDC_IDC_MSMT_BATTERY_STATUS: NORMAL
MDC_IDC_MSMT_CAP_CHARGE_DTM: NORMAL
MDC_IDC_MSMT_CAP_CHARGE_ENERGY: 0 J
MDC_IDC_MSMT_CAP_CHARGE_TIME: 0 S
MDC_IDC_MSMT_CAP_CHARGE_TIME: 9.39
MDC_IDC_MSMT_CAP_CHARGE_TYPE: NORMAL
MDC_IDC_MSMT_CAP_CHARGE_TYPE: NORMAL
MDC_IDC_MSMT_LEADCHNL_RA_IMPEDANCE_VALUE: 590 OHM
MDC_IDC_MSMT_LEADCHNL_RA_PACING_THRESHOLD_AMPLITUDE: 1 V
MDC_IDC_MSMT_LEADCHNL_RA_PACING_THRESHOLD_PULSEWIDTH: 0.4 MS
MDC_IDC_MSMT_LEADCHNL_RA_SENSING_INTR_AMPL: 4.3 MV
MDC_IDC_MSMT_LEADCHNL_RV_IMPEDANCE_VALUE: 327 OHM
MDC_IDC_MSMT_LEADCHNL_RV_PACING_THRESHOLD_AMPLITUDE: 0.8 V
MDC_IDC_MSMT_LEADCHNL_RV_PACING_THRESHOLD_PULSEWIDTH: 0.4 MS
MDC_IDC_MSMT_LEADCHNL_RV_SENSING_INTR_AMPL: 12.5 MV
MDC_IDC_PG_IMPLANT_DTM: NORMAL
MDC_IDC_PG_MFG: NORMAL
MDC_IDC_PG_MODEL: NORMAL
MDC_IDC_PG_SERIAL: NORMAL
MDC_IDC_PG_TYPE: NORMAL
MDC_IDC_SESS_CLINIC_NAME: NORMAL
MDC_IDC_SESS_DTM: NORMAL
MDC_IDC_SESS_TYPE: NORMAL
MDC_IDC_SET_BRADY_AT_MODE_SWITCH_MODE: NORMAL
MDC_IDC_SET_BRADY_AT_MODE_SWITCH_RATE: 170 {BEATS}/MIN
MDC_IDC_SET_BRADY_HYSTRATE: NORMAL
MDC_IDC_SET_BRADY_LOWRATE: 60 {BEATS}/MIN
MDC_IDC_SET_BRADY_MAX_SENSOR_RATE: 130 {BEATS}/MIN
MDC_IDC_SET_BRADY_MAX_TRACKING_RATE: 130 {BEATS}/MIN
MDC_IDC_SET_BRADY_MODE: NORMAL
MDC_IDC_SET_BRADY_PAV_DELAY_HIGH: 200 MS
MDC_IDC_SET_BRADY_PAV_DELAY_LOW: 300 MS
MDC_IDC_SET_BRADY_SAV_DELAY_HIGH: 200 MS
MDC_IDC_SET_BRADY_SAV_DELAY_LOW: 300 MS
MDC_IDC_SET_LEADCHNL_RA_PACING_AMPLITUDE: 2 V
MDC_IDC_SET_LEADCHNL_RA_PACING_CAPTURE_MODE: NORMAL
MDC_IDC_SET_LEADCHNL_RA_PACING_POLARITY: NORMAL
MDC_IDC_SET_LEADCHNL_RA_PACING_PULSEWIDTH: 0.4 MS
MDC_IDC_SET_LEADCHNL_RA_SENSING_ADAPTATION_MODE: NORMAL
MDC_IDC_SET_LEADCHNL_RA_SENSING_POLARITY: NORMAL
MDC_IDC_SET_LEADCHNL_RA_SENSING_SENSITIVITY: 0.25 MV
MDC_IDC_SET_LEADCHNL_RV_PACING_AMPLITUDE: 2 V
MDC_IDC_SET_LEADCHNL_RV_PACING_CAPTURE_MODE: NORMAL
MDC_IDC_SET_LEADCHNL_RV_PACING_POLARITY: NORMAL
MDC_IDC_SET_LEADCHNL_RV_PACING_PULSEWIDTH: 0.4 MS
MDC_IDC_SET_LEADCHNL_RV_SENSING_ADAPTATION_MODE: NORMAL
MDC_IDC_SET_LEADCHNL_RV_SENSING_POLARITY: NORMAL
MDC_IDC_SET_LEADCHNL_RV_SENSING_SENSITIVITY: 0.6 MV
MDC_IDC_SET_ZONE_DETECTION_INTERVAL: 250 MS
MDC_IDC_SET_ZONE_DETECTION_INTERVAL: 300 MS
MDC_IDC_SET_ZONE_DETECTION_INTERVAL: 353 MS
MDC_IDC_SET_ZONE_STATUS: NORMAL
MDC_IDC_SET_ZONE_TYPE: NORMAL
MDC_IDC_SET_ZONE_VENDOR_TYPE: NORMAL
MDC_IDC_STAT_AT_BURDEN_PERCENT: 0 %
MDC_IDC_STAT_BRADY_DTM_END: NORMAL
MDC_IDC_STAT_BRADY_DTM_START: NORMAL
MDC_IDC_STAT_BRADY_RA_PERCENT_PACED: 47 %
MDC_IDC_STAT_BRADY_RV_PERCENT_PACED: 1 %
MDC_IDC_STAT_EPISODE_RECENT_COUNT: 0
MDC_IDC_STAT_EPISODE_RECENT_COUNT_DTM_END: NORMAL
MDC_IDC_STAT_EPISODE_RECENT_COUNT_DTM_START: NORMAL
MDC_IDC_STAT_EPISODE_TOTAL_COUNT: 0
MDC_IDC_STAT_EPISODE_TOTAL_COUNT_DTM_END: NORMAL
MDC_IDC_STAT_EPISODE_TYPE: NORMAL
MDC_IDC_STAT_EPISODE_VENDOR_TYPE: NORMAL
MDC_IDC_STAT_TACHYTHERAPY_ATP_DELIVERED_RECENT: 0
MDC_IDC_STAT_TACHYTHERAPY_ATP_DELIVERED_TOTAL: 0
MDC_IDC_STAT_TACHYTHERAPY_RECENT_DTM_END: NORMAL
MDC_IDC_STAT_TACHYTHERAPY_RECENT_DTM_START: NORMAL
MDC_IDC_STAT_TACHYTHERAPY_SHOCKS_ABORTED_RECENT: 0
MDC_IDC_STAT_TACHYTHERAPY_SHOCKS_ABORTED_TOTAL: 0
MDC_IDC_STAT_TACHYTHERAPY_SHOCKS_DELIVERED_RECENT: 0
MDC_IDC_STAT_TACHYTHERAPY_SHOCKS_DELIVERED_TOTAL: 0
MDC_IDC_STAT_TACHYTHERAPY_TOTAL_DTM_END: NORMAL

## 2024-05-17 ENCOUNTER — LAB (OUTPATIENT)
Dept: LAB | Facility: CLINIC | Age: 74
End: 2024-05-17
Payer: MEDICARE

## 2024-05-17 ENCOUNTER — APPOINTMENT (OUTPATIENT)
Dept: RADIATION THERAPY | Facility: OUTPATIENT CENTER | Age: 74
End: 2024-05-17
Payer: MEDICARE

## 2024-05-17 DIAGNOSIS — I50.20 HEART FAILURE WITH REDUCED EJECTION FRACTION, NYHA CLASS I (H): Primary | ICD-10-CM

## 2024-05-17 DIAGNOSIS — I50.20 HEART FAILURE WITH REDUCED EJECTION FRACTION, NYHA CLASS I (H): ICD-10-CM

## 2024-05-17 LAB
ANION GAP SERPL CALCULATED.3IONS-SCNC: 14 MMOL/L (ref 7–15)
BUN SERPL-MCNC: 29.1 MG/DL (ref 8–23)
CALCIUM SERPL-MCNC: 9.2 MG/DL (ref 8.8–10.2)
CHLORIDE SERPL-SCNC: 94 MMOL/L (ref 98–107)
CREAT SERPL-MCNC: 0.92 MG/DL (ref 0.67–1.17)
DEPRECATED HCO3 PLAS-SCNC: 25 MMOL/L (ref 22–29)
EGFRCR SERPLBLD CKD-EPI 2021: 87 ML/MIN/1.73M2
GLUCOSE SERPL-MCNC: 112 MG/DL (ref 70–99)
MAGNESIUM SERPL-MCNC: 2 MG/DL (ref 1.7–2.3)
NT-PROBNP SERPL-MCNC: 2045 PG/ML (ref 0–900)
POTASSIUM SERPL-SCNC: 4.2 MMOL/L (ref 3.4–5.3)
SODIUM SERPL-SCNC: 133 MMOL/L (ref 135–145)

## 2024-05-17 PROCEDURE — 36415 COLL VENOUS BLD VENIPUNCTURE: CPT

## 2024-05-17 PROCEDURE — 83880 ASSAY OF NATRIURETIC PEPTIDE: CPT

## 2024-05-17 PROCEDURE — 80048 BASIC METABOLIC PNL TOTAL CA: CPT

## 2024-05-17 PROCEDURE — 83735 ASSAY OF MAGNESIUM: CPT

## 2024-05-17 NOTE — PROGRESS NOTES
Called to schedule labs prior to video visit on Monday. Duane can go to [place] this afternoon to get labs, which should result for Monday's appt.

## 2024-05-20 ENCOUNTER — MYC MEDICAL ADVICE (OUTPATIENT)
Dept: CARDIOLOGY | Facility: CLINIC | Age: 74
End: 2024-05-20
Payer: MEDICARE

## 2024-05-20 ENCOUNTER — VIRTUAL VISIT (OUTPATIENT)
Dept: CARDIOLOGY | Facility: CLINIC | Age: 74
End: 2024-05-20
Attending: INTERNAL MEDICINE
Payer: MEDICARE

## 2024-05-20 VITALS
WEIGHT: 132.6 LBS | BODY MASS INDEX: 21.31 KG/M2 | DIASTOLIC BLOOD PRESSURE: 73 MMHG | SYSTOLIC BLOOD PRESSURE: 107 MMHG | HEIGHT: 66 IN | HEART RATE: 61 BPM

## 2024-05-20 DIAGNOSIS — Z95.810 ICD (IMPLANTABLE CARDIOVERTER-DEFIBRILLATOR) IN PLACE: ICD-10-CM

## 2024-05-20 DIAGNOSIS — E78.2 MIXED HYPERLIPIDEMIA: ICD-10-CM

## 2024-05-20 DIAGNOSIS — I25.10 CORONARY ARTERY DISEASE INVOLVING NATIVE CORONARY ARTERY OF NATIVE HEART WITHOUT ANGINA PECTORIS: ICD-10-CM

## 2024-05-20 DIAGNOSIS — I25.5 ISCHEMIC CARDIOMYOPATHY: ICD-10-CM

## 2024-05-20 DIAGNOSIS — I50.42 CHRONIC COMBINED SYSTOLIC AND DIASTOLIC HEART FAILURE (H): Primary | ICD-10-CM

## 2024-05-20 PROCEDURE — 99214 OFFICE O/P EST MOD 30 MIN: CPT | Mod: 95 | Performed by: INTERNAL MEDICINE

## 2024-05-20 RX ORDER — LISINOPRIL 2.5 MG/1
2.5 TABLET ORAL DAILY
Qty: 90 TABLET | Refills: 1 | Status: ON HOLD | OUTPATIENT
Start: 2024-05-20 | End: 2024-10-05

## 2024-05-20 ASSESSMENT — PAIN SCALES - GENERAL: PAINLEVEL: NO PAIN (0)

## 2024-05-20 NOTE — PROGRESS NOTES
[facility]  Advanced Heart Failure Clinic    Patient Name: Duane C Johnson   : 1950   Date of Visit: 2024    Primary Care Physician: Jose Coles MD     Referring Physician: NADEGE Grier, CNP  Reason for Visit: ischemic cardiomyopathy    Virtual Visit Details     Type of service:  Video Visit   Video Start Time: 4:15 pm  Video End Time:4:34 PM    Originating Location (pt. Location): Home    Distant Location (provider location):  On-site  Platform used for Video Visit: AmWell       Dear Sirisha LIGHT, and Dr. Coles,     I had the pleasure of seeing Duane C Johnson today in the [facility]. As you know Duane C Johnson is a pleasant 74 year old year old male, who presents today for further evaluation of ischemic cardiomyopathy.    Mr. Aguila is a 73 year old male with medical history pertinent for anterior STEMI s/p PCI to the pLAD on 10/26/23 with a VT/VF arrest the next day (10/27) with patent stents and unchanged anatomy on repeat angiogram. Placed on amiodarone and discharged 23, ICD was not indicated as this was within 48h of his MI. His EF prior do discharge was 20-30% with a large area of akinesis in the LAD placed on apixaban due to this. He has since been seen in critical care clinic and CORE clinic and established HF care with me in 2024.    Mr. Aguila was readmitted -23 with ADHF and treated with IV lasix. He had some lightheadedness and his metoprolol dose was adjusted. Brillinta changed to the plavix due to SOB. Seen in the ED on 23 for hypotension with BPs 80s/60s. Holding lasix, metoprolol, and losartan. Of note, he was asymptomatic with hypotension. He was seen in the ED on  for chest pain with unremarkable work-up,  admitted - for shortness of breath with bilateral pleural effusions s/p thoracentesis and ADHF exacerbation. He returned to the ED on  with SOB and was IV diuresed  with bumex and his outpatient regimen was switched to PO bumex. He was instructed to hold losartan and metoprolol for hypotension.      Since December, no ER visits or hospitalization for HF. He underwent ICD implantation recently and is healing well. He reports no worsening symptoms and feels things are stable. He went to ER for ICD site concerns that have resolved.    Today he reports feeling well. He completed cardiac rehab. He reports no worsening symptoms and feels things are stable.  He has a large property and goes for walks - walking from the post box to his house is 0.25 miles and if walking up a path that is uphill - he was getting short of breath but can do so on a different path that is flatter. He uses a stair lift at home, and does not take stairs, as it makes him out of breath. He denies lightheadedness unless he gets up quickly. Overall he feels like he is doing okay    They have not had to hold medications for low BP -  since March 2024    He denies chest pain, PND/orthopnea, palpitations, lightheadedness, syncope, leg swelling.      Wife closely monitors sodium and fluid intake. They have been adhering to a 1.5 L FR and 1500 mg sodium restriction.      Home BPs - 90s-100s / 50s-60s (once was in the 120s)  HR 50-65  Weight 132-133 lbs      ROS:  A complete 10-point ROS was negative except as above.    PAST MEDICAL HISTORY:  Past Medical History:   Diagnosis Date    Benign essential hypertension     CAD (coronary artery disease)     Chronic systolic heart failure (H)     Hypertension     ST elevation myocardial infarction (STEMI), unspecified artery (H)     Ventricular fibrillation (H)        PAST SURGICAL HISTORY:  Past Surgical History:   Procedure Laterality Date    COLONOSCOPY N/A 3/23/2023    Procedure: COLONOSCOPY, FLEXIBLE, WITH LESION REMOVAL USING SNARE;  Surgeon: Jose Faustin MD;  Location: [facility]    CV CENTRAL VENOUS CATHETER PLACEMENT N/A 10/26/2023    Procedure: Central Venous  Catheter Placement;  Surgeon: Rob Lyles MD;  Location:  HEART CARDIAC CATH LAB    CV CORONARY ANGIOGRAM N/A 10/27/2023    Procedure: Coronary Angiogram;  Surgeon: Rob Lyles MD;  Location:  HEART CARDIAC CATH LAB    CV CORONARY ANGIOGRAM N/A 10/26/2023    Procedure: Coronary Angiogram;  Surgeon: Rob Lyles MD;  Location:  HEART CARDIAC CATH LAB    CV LEFT HEART CATH N/A 10/26/2023    Procedure: Left Heart Catheterization;  Surgeon: Rob Lyles MD;  Location:  HEART CARDIAC CATH LAB    CV PCI N/A 10/27/2023    Procedure: Percutaneous Coronary Intervention;  Surgeon: Rob Lyles MD;  Location:  HEART CARDIAC CATH LAB    CV PCI STENT DRUG ELUTING N/A 10/26/2023    Procedure: Percutaneous Coronary Intervention Stent;  Surgeon: Rob Lyles MD;  Location:  HEART CARDIAC CATH LAB    CV RIGHT HEART CATH MEASUREMENTS RECORDED N/A 5/6/2024    Procedure: Heart Cath Right Heart Cath;  Surgeon: Rob Lyles MD;  Location:  HEART CARDIAC CATH LAB    EP ICD INSERT SINGLE N/A 5/8/2024    Procedure: Implantable Cardioverter Defibrillator Device & Lead Implant Dual;  Surgeon: Guero Black MD;  Location:  HEART CARDIAC CATH LAB    INJECTION, HYDROGEL SPACER N/A 4/22/2024    Procedure: INJECTION, HYDROGEL SPACER AND FIDUCIAL MARKER PLACEMENT;  Surgeon: Stanislaw Barros MD;  Location: [facility]       FAMILY HISTORY:  Family History   Problem Relation Age of Onset    Other Cancer Mother     Obesity Mother     GI problems Father     Uterine Cancer Sister        SOCIAL HISTORY:  Social History     Socioeconomic History    Marital status:      Spouse name: None    Number of children: None    Years of education: None    Highest education level: None   Tobacco Use    Smoking status: Former     Packs/day: 0.25     Years: 30.00     Additional pack years: 0.00     Total pack years: 7.50     Types: Cigarettes     Quit date: 10/26/2023      Years since quittin.2     Passive exposure: Current    Smokeless tobacco: Never    Tobacco comments:     Quit 10/26/2023   Vaping Use    Vaping Use: Never used   Substance and Sexual Activity    Alcohol use: Yes     Comment: 1-2 beers per day    Drug use: No    Sexual activity: Yes     Partners: Female     Birth control/protection: None   Other Topics Concern    Parent/sibling w/ CABG, MI or angioplasty before 65F 55M? No     Social Determinants of Health     Financial Resource Strain: Low Risk  (2024)    Financial Resource Strain     Within the past 12 months, have you or your family members you live with been unable to get utilities (heat, electricity) when it was really needed?: No   Food Insecurity: Low Risk  (2024)    Food Insecurity     Within the past 12 months, did you worry that your food would run out before you got money to buy more?: No     Within the past 12 months, did the food you bought just not last and you didn t have money to get more?: No   Transportation Needs: Low Risk  (2024)    Transportation Needs     Within the past 12 months, has lack of transportation kept you from medical appointments, getting your medicines, non-medical meetings or appointments, work, or from getting things that you need?: No   Physical Activity: Inactive (2023)    Exercise Vital Sign     Days of Exercise per Week: 0 days     Minutes of Exercise per Session: 0 min   Social Connections: Unknown (2023)    Social Connection and Isolation Panel [NHANES]     Marital Status:    Interpersonal Safety: Low Risk  (10/9/2023)    Interpersonal Safety     Do you feel physically and emotionally safe where you currently live?: Yes     Within the past 12 months, have you been hit, slapped, kicked or otherwise physically hurt by someone?: No     Within the past 12 months, have you been humiliated or emotionally abused in other ways by your partner or ex-partner?: No   Housing Stability: Low Risk   (1/2/2024)    Housing Stability     Do you have housing? : Yes     Are you worried about losing your housing?: No       MEDICATIONS:  Current Outpatient Medications   Medication Sig Dispense Refill    amiodarone (PACERONE) 200 MG tablet Take 1 tablet (200 mg) by mouth daily 90 tablet 3    apixaban ANTICOAGULANT (ELIQUIS) 5 MG tablet Take 1 tablet (5 mg) by mouth 2 times daily 180 tablet 3    atorvastatin (LIPITOR) 80 MG tablet Take 1 tablet (80 mg) by mouth daily 90 tablet 3    bumetanide (BUMEX) 1 MG tablet TAKE 1 TABLET BY MOUTH ONCE A DAY 30 tablet 3    cetirizine (ZYRTEC) 10 MG tablet Take 10 mg by mouth daily      clopidogrel (PLAVIX) 75 MG tablet Take 1 tablet (75 mg) by mouth daily 30 tablet 3    escitalopram (LEXAPRO) 10 MG tablet Take 10 mg by mouth daily      fish oil-omega-3 fatty acids 1000 MG capsule Take 1 g by mouth 2 times daily Also contains another supplement      lisinopril (ZESTRIL) 2.5 MG tablet Take 0.5 tablets (1.25 mg) by mouth daily HOLD for systolic blood pressure < 90 45 tablet 1    magnesium oxide (MAG-OX) 400 MG tablet Take 1 tablet (400 mg) by mouth daily 90 tablet 3    melatonin 5 MG tablet Take 1-2 tablets (5-10 mg) by mouth at bedtime 60 tablet 0    metoprolol succinate ER (TOPROL XL) 25 MG 24 hr tablet Take 0.5 tablets (12.5 mg) by mouth daily Hold for systolic blood pressure less than 90. 45 tablet 1    multivitamin w/minerals (THERA-VIT-M) tablet Take 1 tablet by mouth daily      potassium chloride ER (KLOR-CON M) 20 MEQ CR tablet Take 1 tablet (20 mEq) by mouth daily 30 tablet 5    vitamin C (ASCORBIC ACID) 1000 MG TABS Take 1,000 mg by mouth daily       No current facility-administered medications for this visit.        ALLERGIES:     Allergies   Allergen Reactions    Brilinta [Ticagrelor] Other (See Comments) and Difficulty breathing     Per pt and spouse, hyperventilation.    Clonazepam Other (See Comments)     Per spouse, acted like a zombie and he was shaky, could barely  "talk.       PHYSICAL EXAM:  /73   Pulse 61   Ht 1.676 m (5' 6\")   Wt 60.1 kg (132 lb 9.6 oz)   BMI 21.40 kg/m    Exam - limited by virtual visit      LABS:    CMP  Last Comprehensive Metabolic Panel:  Sodium   Date Value Ref Range Status   05/17/2024 133 (L) 135 - 145 mmol/L Final     Comment:     Reference intervals for this test were updated on 09/26/2023 to more accurately reflect our healthy population. There may be differences in the flagging of prior results with similar values performed with this method. Interpretation of those prior results can be made in the context of the updated reference intervals.    05/26/2021 141 133 - 144 mmol/L Final     Sodium Whole Blood   Date Value Ref Range Status   10/27/2023 138 135 - 145 mmol/L Final     Potassium   Date Value Ref Range Status   05/17/2024 4.2 3.4 - 5.3 mmol/L Final   05/26/2021 4.5 3.4 - 5.3 mmol/L Final     Potassium Whole Blood   Date Value Ref Range Status   10/27/2023 4.1 3.4 - 5.3 mmol/L Final     Chloride   Date Value Ref Range Status   05/17/2024 94 (L) 98 - 107 mmol/L Final   05/26/2021 105 94 - 109 mmol/L Final     Chloride Whole Blood   Date Value Ref Range Status   10/27/2023 103 94 - 109 mmol/L Final     Carbon Dioxide   Date Value Ref Range Status   05/26/2021 29 20 - 32 mmol/L Final     Carbon Dioxide (CO2)   Date Value Ref Range Status   05/17/2024 25 22 - 29 mmol/L Final     Carbon Dioxide Whole Blood   Date Value Ref Range Status   10/27/2023 27 20 - 32 mmol/L Final     Anion Gap   Date Value Ref Range Status   05/17/2024 14 7 - 15 mmol/L Final   05/26/2021 7 3 - 14 mmol/L Final     Glucose   Date Value Ref Range Status   05/17/2024 112 (H) 70 - 99 mg/dL Final   05/26/2021 125 (H) 70 - 99 mg/dL Final     GLUCOSE BY METER POCT   Date Value Ref Range Status   11/01/2023 121 (H) 70 - 99 mg/dL Final     Urea Nitrogen   Date Value Ref Range Status   05/17/2024 29.1 (H) 8.0 - 23.0 mg/dL Final   05/26/2021 41 (H) 7 - 30 mg/dL Final "     Creatinine   Date Value Ref Range Status   05/17/2024 0.92 0.67 - 1.17 mg/dL Final   05/26/2021 0.80 0.66 - 1.25 mg/dL Final     GFR Estimate   Date Value Ref Range Status   05/17/2024 87 >60 mL/min/1.73m2 Final   05/26/2021 90 >60 mL/min/[1.73_m2] Final     Comment:     Non  GFR Calc  Starting 12/18/2018, serum creatinine based estimated GFR (eGFR) will be   calculated using the Chronic Kidney Disease Epidemiology Collaboration   (CKD-EPI) equation.       GFR, ESTIMATED POCT   Date Value Ref Range Status   10/26/2023 >60 >60 mL/min/1.73m2 Final     Calcium   Date Value Ref Range Status   05/17/2024 9.2 8.8 - 10.2 mg/dL Final   05/26/2021 8.3 (L) 8.5 - 10.1 mg/dL Final     Bilirubin Total   Date Value Ref Range Status   02/07/2024 0.6 <=1.2 mg/dL Final   03/20/2018 0.5 0.2 - 1.3 mg/dL Final     Alkaline Phosphatase   Date Value Ref Range Status   02/07/2024 95 40 - 150 U/L Final     Comment:     Reference intervals for this test were updated on 11/14/2023 to more accurately reflect our healthy population. There may be differences in the flagging of prior results with similar values performed with this method. Interpretation of those prior results can be made in the context of the updated reference intervals.   03/20/2018 82 40 - 150 U/L Final     ALT   Date Value Ref Range Status   02/07/2024 29 0 - 70 U/L Final     Comment:     Reference intervals for this test were updated on 6/12/2023 to more accurately reflect our healthy population. There may be differences in the flagging of prior results with similar values performed with this method. Interpretation of those prior results can be made in the context of the updated reference intervals.     03/20/2018 22 0 - 70 U/L Final     AST   Date Value Ref Range Status   02/07/2024 35 0 - 45 U/L Final     Comment:     Reference intervals for this test were updated on 6/12/2023 to more accurately reflect our healthy population. There may be differences  "in the flagging of prior results with similar values performed with this method. Interpretation of those prior results can be made in the context of the updated reference intervals.   2018 20 0 - 45 U/L Final       NT-proBNP       TROP  No results found for: \"TROPI\", \"TROPONIN\", \"TROPR\", \"TROPN\"    CBC  CBC RESULTS:   Recent Labs   Lab Test 24  1122   WBC 5.3   RBC 4.05*   HGB 12.4*   HCT 37.1*   MCV 92   MCH 30.6   MCHC 33.4   RDW 14.4          LIPIDS  Recent Labs   Lab Test 10/26/23  0336 10/26/23  0059   CHOL 96 117   HDL 30* 32*   LDL 60 67   TRIG 29 89       TSH  TSH   Date Value Ref Range Status   2023 5.24 (H) 0.30 - 4.20 uIU/mL Final       HBA1C  Lab Results   Component Value Date    A1C 4.8 2023         CARDIAC DATA:    EK2024: Heart rate 56 bpm, sinus bradycardia, anteroseptal infarct age-indeterminate, prolonged QT    Echo:  10/30/2023  Interpretation Summary  Ischemic CM. Large LAD MI. Left ventricular function is decreased. The  ejection fraction is 20-30% (severely reduced).  Global right ventricular function is normal.  No significant valvular abnormalities present.  IVC diameter <2.1 cm collapsing >50% with sniff suggests a normal RA pressure  of 3 mmHg.  No pericardial effusion is present.  No significant changes noted.    2024  1. The left ventricle is moderately dilated. Left ventricular hypertrophy is noted by two-dimensional echocardiography. Proximal septal thickening is noted. Left ventricular systolic function is moderate to severely reduced. The visual ejection fraction is 25-30%. Biplane LVEF is 25%. Diastolic Doppler findings (E/E' ratio and/or other parameters) suggest left ventricular filling pressures are increased. There is severe hypokinesis to akinesis of the mid anterior/anterolateral/anteroseptal/inferoseptal walls and all apical segments of the left ventricle. There is no thrombus seen in the left ventricle.  2. The right ventricle is " normal size. The right ventricular systolic function is normal.  3. The left atrium is moderately dilated. Right atrial size is normal. There is no color Doppler evidence of an atrial shunt.  4. There is moderate to mod-severe (2-3+) mitral regurgitation.  5. There is mild to moderate (1-2+) tricuspid regurgitation.Right ventricular systolic pressure is elevated, consistent with moderate pulmonary hypertension.  6. No pericardial effusion.  7. In direct comparison to the previous study dated 10/30/2023, there has been an interval increase in the degree of mitral regurgitation. The other findings are similar.    Stress Test:  None recent    Cardiac MRI:  12/6/2023 C[facility]  Clinical history: 73 year old with anterior STEMI, cardiac arrest in Oct 2023  Comparison CMR: none  1. The left ventricle is severely enlarged. There is wall thinning and akinesis of the mid-distal anterior,  mid anteroseptum, distal septum and the apex  The global systolic function is severely reduced. The LVEF is  28%.  2. The right ventricle is normal in cavity size. The global systolic function is normal. The RVEF is 52%.   3. The right atrium is normal and the left atrium is severely enlarged.  4. There is mild mitral regurgitation.   5. There is transmural late gadolinium enhancement in mid-distal anterior, mid anteroseptum, distal  sepum  and the apex consistent with a large infarction in the LAD territory.   6. There is no pericardial effusion.  7. There is no intracardiac thrombus.  8. There are bilateral pleural effusions.  CONCLUSIONS:   Ischemic cardiomyopathy with a large anteroapical infarction.   Severely reduced left ventricular function and normal right ventricular function, LVEF 28% and RVEF 52%.    Cardiac Catheterization:  10/26/23    1st Mrg lesion is 20% stenosed.    Prox LAD to Mid LAD lesion is 100% stenosed.    1st Diag-1 lesion is 80% stenosed.    1st Diag-2 lesion is 50% stenosed.    Dist LAD lesion is 100% stenosed.    RPDA  lesion is 70% stenosed.    Dist RCA lesion is 40% stenosed.     Anterior STEMI  Two vessel obstructive CAD (100% pLAD occlusion, 70% rPDA stenosis, 80% diagonal 1 stenosis)  PCI of pLAD to mLAD with BRENDA x 1 (Synergy 2.50x 28 mm) post dilated to 2.75 vessel  CVC placement in RIJ  LVEDP of 26 mmHg  Hemostasis of RRA with TR band     10/26/23:   Unchanged angiogram with patient stent and good distal flow in all arteries  Most likely evolving injury related VT   Amiodarone loaded 75 mg and continue on the floor loading and consult EP for possible vest and or EP study    RHC 5/6/24  RA: 10/9/6 mmHg  RV: 61/11 mmHg  PA: 63/24/38 mmHg  PCWP: Unable to obtain accurate measurement  CO/CI (Goldy): 3.89/2.3 L/min/m2    PA sat: 64%  MAP: 85 mmHg      Right sided filling pressures are normal.    Mild elevated pulmonary hypertension.    Normal cardiac output level.    CPTX May 2024    ASSESSMENT/PLAN:  In summary, Duane C Johnson is here today with the following -      Chronic combined systolic diastolic heart failure, ACC/AHA stage C, NYHA class III, EF 25%  2.  Ischemic cardiomyopathy  3.  Coronary artery disease, history of anterior STEMI in October 2023, s/p PCI of LAD with drug-eluting stents  4. History of VT within 48 hours of his MI, did not receive an ICD, now being scheduled for primary prevention ICD  5.  Ongoing issues with hypotension and hospitalizations for heart failure, most recently in December 2023    Plans -  Duane has ischemic cardiomyopathy after an anterior STEMI.  His LVEF has been quite low at 25%.  He also has limited blood pressure although is asymptomatic from this.    His right heart cath and cardiopulmonary stress test -  look reasonable and does not need consideration for LVAD now, but can consider repeating 6 months - 1 year to monitor his cardiac status.    Goal Directed Medical Therapies for Heart Failure:     These are indicated irrespective of the etiology of heart failure.  We discussed the  primary medications, their side effects, the care plan including frequent follow-ups  for optimization of therapies with monitoring of blood pressures, weights and lab results     He has previously been on minimal doses of GDMT due to significant hypotension.  With recent slight improvement in blood pressure, can consider slowly uptitrating these as tolerated.    Beta blocker: Continue metoprolol XL 12.5 mg once daily at bedtime, hold for SBP < 90.  Will also monitor his heart rate on this  ACE/ARB/ARNI: on lisinopril 1.25 mg once daily with instructions to hold for SBP <90 --> since he is tolerating this now without need to hold doses, we can increase to 2.5 mg once daily - can reduce it back to 1.25 mg if he has SBP < 90 consistently or symptoms of hypotension  MRA: yet to start it with significant hypotension  SGLT2 inhibitor: yet to start it with significant hypotension      Diuretics: Continue Bumex  Cardiac Rehab: completed  ICD/ CRT-D: Status post ICD implantation in May 2024  Labs: At follow-up  Follow up: CORE clinic or me in 1-2 months  CV Genetic testing: Deferred in the setting of ischemic cardiomyopathy  Sleep medicine: evaluation with sleep medicine in March 2024     Advised on daily home BP and weight monitoring  Advised on observing caution with salt intake    # CAD s/p PCI to LAD  # STEMI  # VT/VF arrest  - on plavix and apixaban (akinesis of the mid-distal anterior,mid anteroseptum, distal septum and the apex)      I spent 31 minutes in care of the patient today including obtaining recent medical history, personally reviewing recent cardiac testing and/or lab results, today's examination, discussion of testing results and care recommendations with patient.       Thank you for the opportunity to participate in this patient's care. Please feel free to reach out with any questions or concerns.      Ham Cruz MD, [facility]  Associate Professor of Medicine  Advanced Heart Failure, LVAD & Cardiac  Transplant  Director, Cardio-Obstetrics  Cardio-Oncology  [facility]

## 2024-05-20 NOTE — PATIENT INSTRUCTIONS
Cardiology Providers you saw during your visit:  Dr. Cruz     Medication changes:  1- Increase Lisinopril to 2.5 mg once daily. Can hold for systolic blood pressure less than 90.       Follow up:  1- [facility] or Dr. Cruz in 6-8 weeks - we will call to schedule this   2- CORE again in August        Please call if you have:  1. Weight gain of more than 2 pounds in a day or 5 pounds in a week  2. Increased shortness of breath, swelling or bloating  3. Dizziness, lightheadedness   4. Any questions or concerns.        Follow the American Heart Association Diet and Lifestyle recommendations:  Limit saturated fat, trans fat, sodium, red meat, sweets and sugar-sweetened beverages. If you choose to eat red meat, compare labels and select the leanest cuts available.  Aim for at least 150 minutes of moderate physical activity or 75 minutes of vigorous physical activity - or an equal combination of both - each week.     During business hours: [phone], press option # 1 to schedule an appointment or send a message to your care team     After hours, weekends or holidays: On Call Cardiologist- [phone]   option #4 and ask to speak to the on-call Cardiologist. Inform them you are a CORE/heart failure patient at the [facility].     Chelsey Castillo RN BSN CHFN  Cardiology Nurse Care Coordinator (Heart Failure / C.O.R.E.)

## 2024-05-20 NOTE — LETTER
2024      RE: Duane C Johnson  [id number] [facility] [address]       Dear Colleague,    Thank you for the opportunity to participate in the care of your patient, Duane C Johnson, at the [facility] HEART [facility]. Please see a copy of my visit note below.    [facility]  Advanced Heart Failure Clinic    Patient Name: Duane C Johnson   : 1950   Date of Visit: 2024    Primary Care Physician: Jose Coles MD     Referring Physician: NADEGE Grier, CNP  Reason for Visit: ischemic cardiomyopathy    Virtual Visit Details     Type of service:  Video Visit   Video Start Time: 4:15 pm  Video End Time:4:34 PM    Originating Location (pt. Location): Home    Distant Location (provider location):  On-site  Platform used for Video Visit: Liev       Dear Sirisha LIGHT, and Dr. Coles,     I had the pleasure of seeing Duane C Johnson today in the [facility] Advanced Heart Failure Clinic. As you know Duane C Johnson is a pleasant 74 year old year old male, who presents today for further evaluation of ischemic cardiomyopathy.    Mr. Aguila is a 73 year old male with medical history pertinent for anterior STEMI s/p PCI to the pLAD on 10/26/23 with a VT/VF arrest the next day (10/27) with patent stents and unchanged anatomy on repeat angiogram. Placed on amiodarone and discharged 23, ICD was not indicated as this was within 48h of his MI. His EF prior do discharge was 20-30% with a large area of akinesis in the LAD placed on apixaban due to this. He has since been seen in critical care clinic and CORE clinic and established HF care with me in 2024.    Mr. Aguila was readmitted -23 with ADHF and treated with IV lasix. He had some lightheadedness and his metoprolol dose was adjusted. Brillinta changed to the plavix due to SOB. Seen in the ED on 23 for hypotension with BPs 80s/60s.  Holding lasix, metoprolol, and losartan. Of note, he was asymptomatic with hypotension. He was seen in the ED on 12/20 for chest pain with unremarkable work-up,  admitted 12/25-12/28 for shortness of breath with bilateral pleural effusions s/p thoracentesis and ADHF exacerbation. He returned to the ED on 12/30 with SOB and was IV diuresed with bumex and his outpatient regimen was switched to PO bumex. He was instructed to hold losartan and metoprolol for hypotension.      Since December, no ER visits or hospitalization for HF. He underwent ICD implantation recently and is healing well. He reports no worsening symptoms and feels things are stable. He went to ER for ICD site concerns that have resolved.    Today he reports feeling well. He completed cardiac rehab. He reports no worsening symptoms and feels things are stable.  He has a large property and goes for walks - walking from the post box to his house is 0.25 miles and if walking up a path that is uphill - he was getting short of breath but can do so on a different path that is flatter. He uses a stair lift at home, and does not take stairs, as it makes him out of breath. He denies lightheadedness unless he gets up quickly. Overall he feels like he is doing okay    They have not had to hold medications for low BP -  since March 2024    He denies chest pain, PND/orthopnea, palpitations, lightheadedness, syncope, leg swelling.      Wife closely monitors sodium and fluid intake. They have been adhering to a 1.5 L FR and 1500 mg sodium restriction.      Home BPs - 90s-100s / 50s-60s (once was in the 120s)  HR 50-65  Weight 132-133 lbs      ROS:  A complete 10-point ROS was negative except as above.    PAST MEDICAL HISTORY:  Past Medical History:   Diagnosis Date     Benign essential hypertension      CAD (coronary artery disease)      Chronic systolic heart failure (H)      Hypertension      ST elevation myocardial infarction (STEMI), unspecified artery (H)       Ventricular fibrillation (H)        PAST SURGICAL HISTORY:  Past Surgical History:   Procedure Laterality Date     COLONOSCOPY N/A 3/23/2023    Procedure: COLONOSCOPY, FLEXIBLE, WITH LESION REMOVAL USING SNARE;  Surgeon: Jose Faustin MD;  Location: [facility]     CV CENTRAL VENOUS CATHETER PLACEMENT N/A 10/26/2023    Procedure: Central Venous Catheter Placement;  Surgeon: Rob Lyles MD;  Location:  HEART CARDIAC CATH LAB     CV CORONARY ANGIOGRAM N/A 10/27/2023    Procedure: Coronary Angiogram;  Surgeon: Rob Lyles MD;  Location:  HEART CARDIAC CATH LAB     CV CORONARY ANGIOGRAM N/A 10/26/2023    Procedure: Coronary Angiogram;  Surgeon: Rob Lyles MD;  Location:  HEART CARDIAC CATH LAB     CV LEFT HEART CATH N/A 10/26/2023    Procedure: Left Heart Catheterization;  Surgeon: Rob Lyles MD;  Location:  HEART CARDIAC CATH LAB     CV PCI N/A 10/27/2023    Procedure: Percutaneous Coronary Intervention;  Surgeon: Rob yLles MD;  Location:  HEART CARDIAC CATH LAB     CV PCI STENT DRUG ELUTING N/A 10/26/2023    Procedure: Percutaneous Coronary Intervention Stent;  Surgeon: Rob Lyles MD;  Location:  HEART CARDIAC CATH LAB     CV RIGHT HEART CATH MEASUREMENTS RECORDED N/A 5/6/2024    Procedure: Heart Cath Right Heart Cath;  Surgeon: Rob Lyles MD;  Location:  HEART CARDIAC CATH LAB     EP ICD INSERT SINGLE N/A 5/8/2024    Procedure: Implantable Cardioverter Defibrillator Device & Lead Implant Dual;  Surgeon: Guero Black MD;  Location:  HEART CARDIAC CATH LAB     INJECTION, HYDROGEL SPACER N/A 4/22/2024    Procedure: INJECTION, HYDROGEL SPACER AND FIDUCIAL MARKER PLACEMENT;  Surgeon: Stanislaw Barros MD;  Location: [facility]       FAMILY HISTORY:  Family History   Problem Relation Age of Onset     Other Cancer Mother      Obesity Mother      GI problems Father      Uterine Cancer Sister        SOCIAL  HISTORY:  Social History     Socioeconomic History     Marital status:      Spouse name: None     Number of children: None     Years of education: None     Highest education level: None   Tobacco Use     Smoking status: Former     Packs/day: 0.25     Years: 30.00     Additional pack years: 0.00     Total pack years: 7.50     Types: Cigarettes     Quit date: 10/26/2023     Years since quittin.2     Passive exposure: Current     Smokeless tobacco: Never     Tobacco comments:     Quit 10/26/2023   Vaping Use     Vaping Use: Never used   Substance and Sexual Activity     Alcohol use: Yes     Comment: 1-2 beers per day     Drug use: No     Sexual activity: Yes     Partners: Female     Birth control/protection: None   Other Topics Concern     Parent/sibling w/ CABG, MI or angioplasty before 65F 55M? No     Social Determinants of Health     Financial Resource Strain: Low Risk  (2024)    Financial Resource Strain      Within the past 12 months, have you or your family members you live with been unable to get utilities (heat, electricity) when it was really needed?: No   Food Insecurity: Low Risk  (2024)    Food Insecurity      Within the past 12 months, did you worry that your food would run out before you got money to buy more?: No      Within the past 12 months, did the food you bought just not last and you didn t have money to get more?: No   Transportation Needs: Low Risk  (2024)    Transportation Needs      Within the past 12 months, has lack of transportation kept you from medical appointments, getting your medicines, non-medical meetings or appointments, work, or from getting things that you need?: No   Physical Activity: Inactive (2023)    Exercise Vital Sign      Days of Exercise per Week: 0 days      Minutes of Exercise per Session: 0 min   Social Connections: Unknown (2023)    Social Connection and Isolation Panel [NHANES]      Marital Status:    Interpersonal Safety: Low  Risk  (10/9/2023)    Interpersonal Safety      Do you feel physically and emotionally safe where you currently live?: Yes      Within the past 12 months, have you been hit, slapped, kicked or otherwise physically hurt by someone?: No      Within the past 12 months, have you been humiliated or emotionally abused in other ways by your partner or ex-partner?: No   Housing Stability: Low Risk  (1/2/2024)    Housing Stability      Do you have housing? : Yes      Are you worried about losing your housing?: No       MEDICATIONS:  Current Outpatient Medications   Medication Sig Dispense Refill     amiodarone (PACERONE) 200 MG tablet Take 1 tablet (200 mg) by mouth daily 90 tablet 3     apixaban ANTICOAGULANT (ELIQUIS) 5 MG tablet Take 1 tablet (5 mg) by mouth 2 times daily 180 tablet 3     atorvastatin (LIPITOR) 80 MG tablet Take 1 tablet (80 mg) by mouth daily 90 tablet 3     bumetanide (BUMEX) 1 MG tablet TAKE 1 TABLET BY MOUTH ONCE A DAY 30 tablet 3     cetirizine (ZYRTEC) 10 MG tablet Take 10 mg by mouth daily       clopidogrel (PLAVIX) 75 MG tablet Take 1 tablet (75 mg) by mouth daily 30 tablet 3     escitalopram (LEXAPRO) 10 MG tablet Take 10 mg by mouth daily       fish oil-omega-3 fatty acids 1000 MG capsule Take 1 g by mouth 2 times daily Also contains another supplement       lisinopril (ZESTRIL) 2.5 MG tablet Take 0.5 tablets (1.25 mg) by mouth daily HOLD for systolic blood pressure < 90 45 tablet 1     magnesium oxide (MAG-OX) 400 MG tablet Take 1 tablet (400 mg) by mouth daily 90 tablet 3     melatonin 5 MG tablet Take 1-2 tablets (5-10 mg) by mouth at bedtime 60 tablet 0     metoprolol succinate ER (TOPROL XL) 25 MG 24 hr tablet Take 0.5 tablets (12.5 mg) by mouth daily Hold for systolic blood pressure less than 90. 45 tablet 1     multivitamin w/minerals (THERA-VIT-M) tablet Take 1 tablet by mouth daily       potassium chloride ER (KLOR-CON M) 20 MEQ CR tablet Take 1 tablet (20 mEq) by mouth daily 30 tablet 5  "    vitamin C (ASCORBIC ACID) 1000 MG TABS Take 1,000 mg by mouth daily       No current facility-administered medications for this visit.        ALLERGIES:     Allergies   Allergen Reactions     Brilinta [Ticagrelor] Other (See Comments) and Difficulty breathing     Per pt and spouse, hyperventilation.     Clonazepam Other (See Comments)     Per spouse, acted like a zombie and he was shaky, could barely talk.       PHYSICAL EXAM:  /73   Pulse 61   Ht 1.676 m (5' 6\")   Wt 60.1 kg (132 lb 9.6 oz)   BMI 21.40 kg/m    Exam - limited by virtual visit      LABS:    CMP  Last Comprehensive Metabolic Panel:  Sodium   Date Value Ref Range Status   05/17/2024 133 (L) 135 - 145 mmol/L Final     Comment:     Reference intervals for this test were updated on 09/26/2023 to more accurately reflect our healthy population. There may be differences in the flagging of prior results with similar values performed with this method. Interpretation of those prior results can be made in the context of the updated reference intervals.    05/26/2021 141 133 - 144 mmol/L Final     Sodium Whole Blood   Date Value Ref Range Status   10/27/2023 138 135 - 145 mmol/L Final     Potassium   Date Value Ref Range Status   05/17/2024 4.2 3.4 - 5.3 mmol/L Final   05/26/2021 4.5 3.4 - 5.3 mmol/L Final     Potassium Whole Blood   Date Value Ref Range Status   10/27/2023 4.1 3.4 - 5.3 mmol/L Final     Chloride   Date Value Ref Range Status   05/17/2024 94 (L) 98 - 107 mmol/L Final   05/26/2021 105 94 - 109 mmol/L Final     Chloride Whole Blood   Date Value Ref Range Status   10/27/2023 103 94 - 109 mmol/L Final     Carbon Dioxide   Date Value Ref Range Status   05/26/2021 29 20 - 32 mmol/L Final     Carbon Dioxide (CO2)   Date Value Ref Range Status   05/17/2024 25 22 - 29 mmol/L Final     Carbon Dioxide Whole Blood   Date Value Ref Range Status   10/27/2023 27 20 - 32 mmol/L Final     Anion Gap   Date Value Ref Range Status   05/17/2024 14 7 - " 15 mmol/L Final   05/26/2021 7 3 - 14 mmol/L Final     Glucose   Date Value Ref Range Status   05/17/2024 112 (H) 70 - 99 mg/dL Final   05/26/2021 125 (H) 70 - 99 mg/dL Final     GLUCOSE BY METER POCT   Date Value Ref Range Status   11/01/2023 121 (H) 70 - 99 mg/dL Final     Urea Nitrogen   Date Value Ref Range Status   05/17/2024 29.1 (H) 8.0 - 23.0 mg/dL Final   05/26/2021 41 (H) 7 - 30 mg/dL Final     Creatinine   Date Value Ref Range Status   05/17/2024 0.92 0.67 - 1.17 mg/dL Final   05/26/2021 0.80 0.66 - 1.25 mg/dL Final     GFR Estimate   Date Value Ref Range Status   05/17/2024 87 >60 mL/min/1.73m2 Final   05/26/2021 90 >60 mL/min/[1.73_m2] Final     Comment:     Non  GFR Calc  Starting 12/18/2018, serum creatinine based estimated GFR (eGFR) will be   calculated using the Chronic Kidney Disease Epidemiology Collaboration   (CKD-EPI) equation.       GFR, ESTIMATED POCT   Date Value Ref Range Status   10/26/2023 >60 >60 mL/min/1.73m2 Final     Calcium   Date Value Ref Range Status   05/17/2024 9.2 8.8 - 10.2 mg/dL Final   05/26/2021 8.3 (L) 8.5 - 10.1 mg/dL Final     Bilirubin Total   Date Value Ref Range Status   02/07/2024 0.6 <=1.2 mg/dL Final   03/20/2018 0.5 0.2 - 1.3 mg/dL Final     Alkaline Phosphatase   Date Value Ref Range Status   02/07/2024 95 40 - 150 U/L Final     Comment:     Reference intervals for this test were updated on 11/14/2023 to more accurately reflect our healthy population. There may be differences in the flagging of prior results with similar values performed with this method. Interpretation of those prior results can be made in the context of the updated reference intervals.   03/20/2018 82 40 - 150 U/L Final     ALT   Date Value Ref Range Status   02/07/2024 29 0 - 70 U/L Final     Comment:     Reference intervals for this test were updated on 6/12/2023 to more accurately reflect our healthy population. There may be differences in the flagging of prior results with  "similar values performed with this method. Interpretation of those prior results can be made in the context of the updated reference intervals.     2018 22 0 - 70 U/L Final     AST   Date Value Ref Range Status   2024 35 0 - 45 U/L Final     Comment:     Reference intervals for this test were updated on 2023 to more accurately reflect our healthy population. There may be differences in the flagging of prior results with similar values performed with this method. Interpretation of those prior results can be made in the context of the updated reference intervals.   2018 20 0 - 45 U/L Final       NT-proBNP       TROP  No results found for: \"TROPI\", \"TROPONIN\", \"TROPR\", \"TROPN\"    CBC  CBC RESULTS:   Recent Labs   Lab Test 24  1122   WBC 5.3   RBC 4.05*   HGB 12.4*   HCT 37.1*   MCV 92   MCH 30.6   MCHC 33.4   RDW 14.4          LIPIDS  Recent Labs   Lab Test 10/26/23  0336 10/26/23  0059   CHOL 96 117   HDL 30* 32*   LDL 60 67   TRIG 29 89       TSH  TSH   Date Value Ref Range Status   2023 5.24 (H) 0.30 - 4.20 uIU/mL Final       HBA1C  Lab Results   Component Value Date    A1C 4.8 2023         CARDIAC DATA:    EK2024: Heart rate 56 bpm, sinus bradycardia, anteroseptal infarct age-indeterminate, prolonged QT    Echo:  10/30/2023  Interpretation Summary  Ischemic CM. Large LAD MI. Left ventricular function is decreased. The  ejection fraction is 20-30% (severely reduced).  Global right ventricular function is normal.  No significant valvular abnormalities present.  IVC diameter <2.1 cm collapsing >50% with sniff suggests a normal RA pressure  of 3 mmHg.  No pericardial effusion is present.  No significant changes noted.    2024  1. The left ventricle is moderately dilated. Left ventricular hypertrophy is noted by two-dimensional echocardiography. Proximal septal thickening is noted. Left ventricular systolic function is moderate to severely reduced. The visual " ejection fraction is 25-30%. Biplane LVEF is 25%. Diastolic Doppler findings (E/E' ratio and/or other parameters) suggest left ventricular filling pressures are increased. There is severe hypokinesis to akinesis of the mid anterior/anterolateral/anteroseptal/inferoseptal walls and all apical segments of the left ventricle. There is no thrombus seen in the left ventricle.  2. The right ventricle is normal size. The right ventricular systolic function is normal.  3. The left atrium is moderately dilated. Right atrial size is normal. There is no color Doppler evidence of an atrial shunt.  4. There is moderate to mod-severe (2-3+) mitral regurgitation.  5. There is mild to moderate (1-2+) tricuspid regurgitation.Right ventricular systolic pressure is elevated, consistent with moderate pulmonary hypertension.  6. No pericardial effusion.  7. In direct comparison to the previous study dated 10/30/2023, there has been an interval increase in the degree of mitral regurgitation. The other findings are similar.    Stress Test:  None recent    Cardiac MRI:  12/6/2023 CMR  Clinical history: 73 year old with anterior STEMI, cardiac arrest in Oct 2023  Comparison CMR: none  1. The left ventricle is severely enlarged. There is wall thinning and akinesis of the mid-distal anterior,  mid anteroseptum, distal septum and the apex  The global systolic function is severely reduced. The LVEF is  28%.  2. The right ventricle is normal in cavity size. The global systolic function is normal. The RVEF is 52%.   3. The right atrium is normal and the left atrium is severely enlarged.  4. There is mild mitral regurgitation.   5. There is transmural late gadolinium enhancement in mid-distal anterior, mid anteroseptum, distal  sepum  and the apex consistent with a large infarction in the LAD territory.   6. There is no pericardial effusion.  7. There is no intracardiac thrombus.  8. There are bilateral pleural effusions.  CONCLUSIONS:   Ischemic  cardiomyopathy with a large anteroapical infarction.   Severely reduced left ventricular function and normal right ventricular function, LVEF 28% and RVEF 52%.    Cardiac Catheterization:  10/26/23     1st Mrg lesion is 20% stenosed.     Prox LAD to Mid LAD lesion is 100% stenosed.     1st Diag-1 lesion is 80% stenosed.     1st Diag-2 lesion is 50% stenosed.     Dist LAD lesion is 100% stenosed.     RPDA lesion is 70% stenosed.     Dist RCA lesion is 40% stenosed.     Anterior STEMI  Two vessel obstructive CAD (100% pLAD occlusion, 70% rPDA stenosis, 80% diagonal 1 stenosis)  PCI of pLAD to mLAD with BRENDA x 1 (Synergy 2.50x 28 mm) post dilated to 2.75 vessel  CVC placement in RIJ  LVEDP of 26 mmHg  Hemostasis of RRA with TR band     10/26/23:   Unchanged angiogram with patient stent and good distal flow in all arteries  Most likely evolving injury related VT   Amiodarone loaded 75 mg and continue on the floor loading and consult EP for possible vest and or EP study    RHC 5/6/24  RA: 10/9/6 mmHg  RV: 61/11 mmHg  PA: 63/24/38 mmHg  PCWP: Unable to obtain accurate measurement  CO/CI (Goldy): 3.89/2.3 L/min/m2    PA sat: 64%  MAP: 85 mmHg       Right sided filling pressures are normal.     Mild elevated pulmonary hypertension.     Normal cardiac output level.    CPTX May 2024    ASSESSMENT/PLAN:  In summary, Duane C Johnson is here today with the following -      Chronic combined systolic diastolic heart failure, ACC/AHA stage C, NYHA class III, EF 25%  2.  Ischemic cardiomyopathy  3.  Coronary artery disease, history of anterior STEMI in October 2023, s/p PCI of LAD with drug-eluting stents  4. History of VT within 48 hours of his MI, did not receive an ICD, now being scheduled for primary prevention ICD  5.  Ongoing issues with hypotension and hospitalizations for heart failure, most recently in December 2023    Plans -  Duane has ischemic cardiomyopathy after an anterior STEMI.  His LVEF has been quite low at 25%.  He  also has limited blood pressure although is asymptomatic from this.    His right heart cath and cardiopulmonary stress test -  look reasonable and does not need consideration for LVAD now, but can consider repeating 6 months - 1 year to monitor his cardiac status.    Goal Directed Medical Therapies for Heart Failure:     These are indicated irrespective of the etiology of heart failure.  We discussed the primary medications, their side effects, the care plan including frequent follow-ups  for optimization of therapies with monitoring of blood pressures, weights and lab results     He has previously been on minimal doses of GDMT due to significant hypotension.  With recent slight improvement in blood pressure, can consider slowly uptitrating these as tolerated.    Beta blocker: Continue metoprolol XL 12.5 mg once daily at bedtime, hold for SBP < 90.  Will also monitor his heart rate on this  ACE/ARB/ARNI: on lisinopril 1.25 mg once daily with instructions to hold for SBP <90 --> since he is tolerating this now without need to hold doses, we can increase to 2.5 mg once daily - can reduce it back to 1.25 mg if he has SBP < 90 consistently or symptoms of hypotension  MRA: yet to start it with significant hypotension  SGLT2 inhibitor: yet to start it with significant hypotension      Diuretics: Continue Bumex  Cardiac Rehab: completed  ICD/ CRT-D: Status post ICD implantation in May 2024  Labs: At follow-up  Follow up: CORE clinic or me in 1-2 months  CV Genetic testing: Deferred in the setting of ischemic cardiomyopathy  Sleep medicine: evaluation with sleep medicine in March 2024     Advised on daily home BP and weight monitoring  Advised on observing caution with salt intake    # CAD s/p PCI to LAD  # STEMI  # VT/VF arrest  - on plavix and apixaban (akinesis of the mid-distal anterior,mid anteroseptum, distal septum and the apex)      I spent 31 minutes in care of the patient today including obtaining recent medical  history, personally reviewing recent cardiac testing and/or lab results, today's examination, discussion of testing results and care recommendations with patient.       Thank you for the opportunity to participate in this patient's care. Please feel free to reach out with any questions or concerns.      Ham Cruz MD, [facility]  Associate Professor of Medicine  Advanced Heart Failure, LVAD & Cardiac Transplant  Director, Cardio-Obstetrics  Cardio-Oncology  [facility]

## 2024-05-20 NOTE — PROGRESS NOTES
"Virtual Visit Details    Type of service:  Video Visit   Video Start Time: {video visit start/end time for provider to select:400962}  Video End Time:{video visit start/end time for provider to select:699351}    Originating Location (pt. Location): {video visit patient location:452712::\"Home\"}  {PROVIDER LOCATION On-site should be selected for visits conducted from your clinic location or adjoining [facility] hospital, academic office, or other nearby [facility] building. Off-site should be selected for all other provider locations, including home:537271}  Distant Location (provider location):  {virtual location provider:[id number]}  Platform used for Video Visit: {Virtual Visit Platforms:070120::\"[employer]\"}  "

## 2024-05-20 NOTE — NURSING NOTE
Is the patient currently in the state of [place]? YES    Visit mode:VIDEO    If the visit is dropped, the patient can be reconnected by: VIDEO VISIT: Text to cell phone:   Telephone Information:   Mobile [phone]      Will anyone else be joining the visit? Pts spouse Melissa  (If patient encounters technical issues they should call [phone])    How would you like to obtain your AVS? MyChart    Are changes needed to the allergy or medication list?  States Eliquis is on hold, no other changes since last reveiwed    Are refills needed on medications prescribed by this physician?  will need Eliquis when given the ok to resume     Reason for visit: SANTANA Machado MA VVF

## 2024-05-21 ENCOUNTER — APPOINTMENT (OUTPATIENT)
Dept: RADIATION THERAPY | Facility: OUTPATIENT CENTER | Age: 74
End: 2024-05-21
Payer: MEDICARE

## 2024-05-21 ENCOUNTER — ANCILLARY PROCEDURE (OUTPATIENT)
Dept: CARDIOLOGY | Facility: CLINIC | Age: 74
End: 2024-05-21
Attending: INTERNAL MEDICINE
Payer: MEDICARE

## 2024-05-21 DIAGNOSIS — I25.5 ISCHEMIC CARDIOMYOPATHY: ICD-10-CM

## 2024-05-21 DIAGNOSIS — I50.20 HEART FAILURE WITH REDUCED EJECTION FRACTION, NYHA CLASS I (H): ICD-10-CM

## 2024-05-21 DIAGNOSIS — Z95.810 ICD (IMPLANTABLE CARDIOVERTER-DEFIBRILLATOR) IN PLACE: ICD-10-CM

## 2024-05-21 DIAGNOSIS — I46.9 CARDIAC ARREST (H): ICD-10-CM

## 2024-05-21 NOTE — TELEPHONE ENCOUNTER
Yolanda not read. Called patient to confirm med changes from visit 5/20 and spoke with him and wife. They have already started the increased Lisinopril this morning and verbalized understanding. 6-8 week follow up scheduled.

## 2024-05-22 ENCOUNTER — APPOINTMENT (OUTPATIENT)
Dept: RADIATION THERAPY | Facility: OUTPATIENT CENTER | Age: 74
End: 2024-05-22
Payer: MEDICARE

## 2024-05-22 ENCOUNTER — OFFICE VISIT (OUTPATIENT)
Dept: RADIATION THERAPY | Facility: OUTPATIENT CENTER | Age: 74
End: 2024-05-22
Payer: MEDICARE

## 2024-05-22 VITALS
OXYGEN SATURATION: 98 % | DIASTOLIC BLOOD PRESSURE: 54 MMHG | RESPIRATION RATE: 18 BRPM | HEART RATE: 61 BPM | BODY MASS INDEX: 21.89 KG/M2 | WEIGHT: 135.6 LBS | SYSTOLIC BLOOD PRESSURE: 98 MMHG

## 2024-05-22 DIAGNOSIS — C61 PROSTATE CANCER (H): Primary | ICD-10-CM

## 2024-05-22 LAB
MDC_IDC_EPISODE_DTM: NORMAL
MDC_IDC_EPISODE_DURATION: 540 S
MDC_IDC_EPISODE_DURATION: 57 S
MDC_IDC_EPISODE_DURATION: 58 S
MDC_IDC_EPISODE_DURATION: 96 S
MDC_IDC_EPISODE_ID: NORMAL
MDC_IDC_EPISODE_TYPE: NORMAL
MDC_IDC_LEAD_CONNECTION_STATUS: NORMAL
MDC_IDC_LEAD_IMPLANT_DT: NORMAL
MDC_IDC_LEAD_LOCATION: NORMAL
MDC_IDC_LEAD_LOCATION_DETAIL_1: NORMAL
MDC_IDC_LEAD_MFG: NORMAL
MDC_IDC_LEAD_MODEL: NORMAL
MDC_IDC_LEAD_POLARITY_TYPE: NORMAL
MDC_IDC_LEAD_SERIAL: NORMAL
MDC_IDC_LEAD_SPECIAL_FUNCTION: NORMAL
MDC_IDC_MSMT_BATTERY_DTM: NORMAL
MDC_IDC_MSMT_BATTERY_REMAINING_LONGEVITY: 120 MO
MDC_IDC_MSMT_BATTERY_REMAINING_PERCENTAGE: 100 %
MDC_IDC_MSMT_BATTERY_STATUS: NORMAL
MDC_IDC_MSMT_BATTERY_STATUS: NORMAL
MDC_IDC_MSMT_CAP_CHARGE_DTM: NORMAL
MDC_IDC_MSMT_CAP_CHARGE_DTM: NORMAL
MDC_IDC_MSMT_CAP_CHARGE_ENERGY: 0 J
MDC_IDC_MSMT_CAP_CHARGE_TIME: 0 S
MDC_IDC_MSMT_CAP_CHARGE_TIME: 9.39
MDC_IDC_MSMT_CAP_CHARGE_TIME: 9.4 S
MDC_IDC_MSMT_CAP_CHARGE_TYPE: NORMAL
MDC_IDC_MSMT_LEADCHNL_RA_IMPEDANCE_VALUE: 552 OHM
MDC_IDC_MSMT_LEADCHNL_RA_PACING_THRESHOLD_AMPLITUDE: 0.4 V
MDC_IDC_MSMT_LEADCHNL_RA_PACING_THRESHOLD_AMPLITUDE: 0.5 V
MDC_IDC_MSMT_LEADCHNL_RA_PACING_THRESHOLD_PULSEWIDTH: 0.4 MS
MDC_IDC_MSMT_LEADCHNL_RA_PACING_THRESHOLD_PULSEWIDTH: 0.4 MS
MDC_IDC_MSMT_LEADCHNL_RA_SENSING_INTR_AMPL: 4 MV
MDC_IDC_MSMT_LEADCHNL_RV_IMPEDANCE_VALUE: 440 OHM
MDC_IDC_MSMT_LEADCHNL_RV_PACING_THRESHOLD_AMPLITUDE: 0.4 V
MDC_IDC_MSMT_LEADCHNL_RV_PACING_THRESHOLD_AMPLITUDE: 0.4 V
MDC_IDC_MSMT_LEADCHNL_RV_PACING_THRESHOLD_PULSEWIDTH: 0.4 MS
MDC_IDC_MSMT_LEADCHNL_RV_PACING_THRESHOLD_PULSEWIDTH: 0.4 MS
MDC_IDC_MSMT_LEADCHNL_RV_SENSING_INTR_AMPL: 19.7 MV
MDC_IDC_PG_IMPLANT_DTM: NORMAL
MDC_IDC_PG_MFG: NORMAL
MDC_IDC_PG_MODEL: NORMAL
MDC_IDC_PG_SERIAL: NORMAL
MDC_IDC_PG_TYPE: NORMAL
MDC_IDC_SESS_CLINIC_NAME: NORMAL
MDC_IDC_SESS_DTM: NORMAL
MDC_IDC_SESS_TYPE: NORMAL
MDC_IDC_SET_BRADY_AT_MODE_SWITCH_MODE: NORMAL
MDC_IDC_SET_BRADY_AT_MODE_SWITCH_MODE: NORMAL
MDC_IDC_SET_BRADY_AT_MODE_SWITCH_RATE: 170 {BEATS}/MIN
MDC_IDC_SET_BRADY_AT_MODE_SWITCH_RATE: 170 {BEATS}/MIN
MDC_IDC_SET_BRADY_HYSTRATE: NORMAL
MDC_IDC_SET_BRADY_LOWRATE: 60 {BEATS}/MIN
MDC_IDC_SET_BRADY_LOWRATE: 60 {BEATS}/MIN
MDC_IDC_SET_BRADY_MAX_SENSOR_RATE: 130 {BEATS}/MIN
MDC_IDC_SET_BRADY_MAX_SENSOR_RATE: 130 {BEATS}/MIN
MDC_IDC_SET_BRADY_MAX_TRACKING_RATE: 130 {BEATS}/MIN
MDC_IDC_SET_BRADY_MAX_TRACKING_RATE: 130 {BEATS}/MIN
MDC_IDC_SET_BRADY_MODE: NORMAL
MDC_IDC_SET_BRADY_MODE: NORMAL
MDC_IDC_SET_BRADY_PAV_DELAY_HIGH: 200 MS
MDC_IDC_SET_BRADY_PAV_DELAY_HIGH: 200 MS
MDC_IDC_SET_BRADY_PAV_DELAY_LOW: 300 MS
MDC_IDC_SET_BRADY_PAV_DELAY_LOW: 300 MS
MDC_IDC_SET_BRADY_SAV_DELAY_HIGH: 200 MS
MDC_IDC_SET_BRADY_SAV_DELAY_HIGH: 200 MS
MDC_IDC_SET_BRADY_SAV_DELAY_LOW: 300 MS
MDC_IDC_SET_BRADY_SAV_DELAY_LOW: 300 MS
MDC_IDC_SET_LEADCHNL_RA_PACING_AMPLITUDE: 2 V
MDC_IDC_SET_LEADCHNL_RA_PACING_AMPLITUDE: 2 V
MDC_IDC_SET_LEADCHNL_RA_PACING_CAPTURE_MODE: NORMAL
MDC_IDC_SET_LEADCHNL_RA_PACING_CAPTURE_MODE: NORMAL
MDC_IDC_SET_LEADCHNL_RA_PACING_POLARITY: NORMAL
MDC_IDC_SET_LEADCHNL_RA_PACING_POLARITY: NORMAL
MDC_IDC_SET_LEADCHNL_RA_PACING_PULSEWIDTH: 0.4 MS
MDC_IDC_SET_LEADCHNL_RA_PACING_PULSEWIDTH: 0.4 MS
MDC_IDC_SET_LEADCHNL_RA_SENSING_ADAPTATION_MODE: NORMAL
MDC_IDC_SET_LEADCHNL_RA_SENSING_ADAPTATION_MODE: NORMAL
MDC_IDC_SET_LEADCHNL_RA_SENSING_POLARITY: NORMAL
MDC_IDC_SET_LEADCHNL_RA_SENSING_POLARITY: NORMAL
MDC_IDC_SET_LEADCHNL_RA_SENSING_SENSITIVITY: 0.25 MV
MDC_IDC_SET_LEADCHNL_RA_SENSING_SENSITIVITY: 0.25 MV
MDC_IDC_SET_LEADCHNL_RV_PACING_AMPLITUDE: 2 V
MDC_IDC_SET_LEADCHNL_RV_PACING_AMPLITUDE: 2 V
MDC_IDC_SET_LEADCHNL_RV_PACING_CAPTURE_MODE: NORMAL
MDC_IDC_SET_LEADCHNL_RV_PACING_CAPTURE_MODE: NORMAL
MDC_IDC_SET_LEADCHNL_RV_PACING_POLARITY: NORMAL
MDC_IDC_SET_LEADCHNL_RV_PACING_POLARITY: NORMAL
MDC_IDC_SET_LEADCHNL_RV_PACING_PULSEWIDTH: 0.4 MS
MDC_IDC_SET_LEADCHNL_RV_PACING_PULSEWIDTH: 0.4 MS
MDC_IDC_SET_LEADCHNL_RV_SENSING_ADAPTATION_MODE: NORMAL
MDC_IDC_SET_LEADCHNL_RV_SENSING_ADAPTATION_MODE: NORMAL
MDC_IDC_SET_LEADCHNL_RV_SENSING_POLARITY: NORMAL
MDC_IDC_SET_LEADCHNL_RV_SENSING_POLARITY: NORMAL
MDC_IDC_SET_LEADCHNL_RV_SENSING_SENSITIVITY: 0.6 MV
MDC_IDC_SET_LEADCHNL_RV_SENSING_SENSITIVITY: 0.6 MV
MDC_IDC_SET_ZONE_DETECTION_INTERVAL: 250 MS
MDC_IDC_SET_ZONE_DETECTION_INTERVAL: 250 MS
MDC_IDC_SET_ZONE_DETECTION_INTERVAL: 300 MS
MDC_IDC_SET_ZONE_DETECTION_INTERVAL: 300 MS
MDC_IDC_SET_ZONE_DETECTION_INTERVAL: 353 MS
MDC_IDC_SET_ZONE_DETECTION_INTERVAL: 353 MS
MDC_IDC_SET_ZONE_STATUS: NORMAL
MDC_IDC_SET_ZONE_TYPE: NORMAL
MDC_IDC_SET_ZONE_VENDOR_TYPE: NORMAL
MDC_IDC_STAT_AT_BURDEN_PERCENT: 0 %
MDC_IDC_STAT_AT_DTM_START: NORMAL
MDC_IDC_STAT_BRADY_DTM_END: NORMAL
MDC_IDC_STAT_BRADY_DTM_START: NORMAL
MDC_IDC_STAT_BRADY_RA_PERCENT_PACED: 31 %
MDC_IDC_STAT_BRADY_RV_PERCENT_PACED: 1 %
MDC_IDC_STAT_EPISODE_RECENT_COUNT: 0
MDC_IDC_STAT_EPISODE_RECENT_COUNT_DTM_END: NORMAL
MDC_IDC_STAT_EPISODE_RECENT_COUNT_DTM_START: NORMAL
MDC_IDC_STAT_EPISODE_TOTAL_COUNT: 0
MDC_IDC_STAT_EPISODE_TOTAL_COUNT_DTM_END: NORMAL
MDC_IDC_STAT_EPISODE_TYPE: NORMAL
MDC_IDC_STAT_EPISODE_VENDOR_TYPE: NORMAL
MDC_IDC_STAT_TACHYTHERAPY_ATP_DELIVERED_RECENT: 0
MDC_IDC_STAT_TACHYTHERAPY_ATP_DELIVERED_RECENT: 0
MDC_IDC_STAT_TACHYTHERAPY_ATP_DELIVERED_TOTAL: 0
MDC_IDC_STAT_TACHYTHERAPY_ATP_DELIVERED_TOTAL: 0
MDC_IDC_STAT_TACHYTHERAPY_RECENT_DTM_END: NORMAL
MDC_IDC_STAT_TACHYTHERAPY_RECENT_DTM_END: NORMAL
MDC_IDC_STAT_TACHYTHERAPY_RECENT_DTM_START: NORMAL
MDC_IDC_STAT_TACHYTHERAPY_RECENT_DTM_START: NORMAL
MDC_IDC_STAT_TACHYTHERAPY_SHOCKS_ABORTED_RECENT: 0
MDC_IDC_STAT_TACHYTHERAPY_SHOCKS_ABORTED_RECENT: 0
MDC_IDC_STAT_TACHYTHERAPY_SHOCKS_ABORTED_TOTAL: 0
MDC_IDC_STAT_TACHYTHERAPY_SHOCKS_ABORTED_TOTAL: 0
MDC_IDC_STAT_TACHYTHERAPY_SHOCKS_DELIVERED_RECENT: 0
MDC_IDC_STAT_TACHYTHERAPY_SHOCKS_DELIVERED_RECENT: 0
MDC_IDC_STAT_TACHYTHERAPY_SHOCKS_DELIVERED_TOTAL: 0
MDC_IDC_STAT_TACHYTHERAPY_SHOCKS_DELIVERED_TOTAL: 0
MDC_IDC_STAT_TACHYTHERAPY_TOTAL_DTM_END: NORMAL
MDC_IDC_STAT_TACHYTHERAPY_TOTAL_DTM_END: NORMAL
MDC_IDC_STAT_TACHYTHERAPY_TOTAL_DTM_START: NORMAL

## 2024-05-22 ASSESSMENT — PAIN SCALES - GENERAL: PAINLEVEL: NO PAIN (0)

## 2024-05-22 NOTE — LETTER
2024         RE: Duane C Johnson  [address] [address] [place] [id number]        Dear Colleague,    Thank you for referring your patient, Duane C Johnson, to the  [facility]. Please see a copy of my visit note below.    [facility]  SPECIALIZING IN BREAKTHROUGHS  Radiation Oncology    On Treatment Visit Note      Duane C Johnson      Date: 2024   MRN: [id number]   : 1950  Diagnosis: prostate cancer      Reason for Visit:  On Radiation Treatment Visit     Treatment Summary to Date  Treatment Site: pelvis Current Dose: 1500/7000 cGy Fractions:       Chemotherapy  Chemo concurrent with radx?: No    Subjective:   Doing okay with respect to radiation treatment thus far.  No  bother.  No  bother.  Energy is okay.       Nursing ROS:   Nutrition Alteration  Diet Type: Patient's Preference  Skin  Skin Reaction: 0 - No changes     ENT and Mouth Exam  ENT/Mouth Note: no oral issues  Cardiovascular  Cardio/Resp Note: ? infected defibrillator?  f/u today with cardiology  Gastrointestinal  GI Note: bowels regular, no issues  Genitourinary   Note: no gu concerns,  rare nocturia     Pain Assessment  Pain Note: no  pain issues      Objective:   BP 98/54   Pulse 61   Resp 18   Wt 61.5 kg (135 lb 9.6 oz)   SpO2 98%   BMI 21.89 kg/m    No apparent distress    Labs:  CBC RESULTS:   Recent Labs   Lab Test 24  0909   WBC 5.6   RBC 3.71*   HGB 11.4*   HCT 34.4*   MCV 93   MCH 30.7   MCHC 33.1   RDW 14.6        ELECTROLYTES:  Recent Labs   Lab Test 24  0909   *   POTASSIUM 4.9   CHLORIDE 100   PRANAV 9.6   CO2 23   BUN 28.8*   CR 0.93   GLC 91       Assessment:  Mr. Aguila is a 74 year old male with significant cardiovascular disease diagnosed with [place] 4+3, PSA 4.61 unfavorable intermediate risk prostate cancer. He is undergoing definitive radiation therapy.    Tolerating radiation therapy well.  All questions and concerns addressed.    Plan:    Continue current therapy.        Mosaiq chart and setup information reviewed  Ports checked    Medication Review  Med list reviewed with patient?: Yes  Med Note: Lupron # 1 6 month dose, given 3/19/2024           Jasper Vallejo MD

## 2024-05-22 NOTE — PROGRESS NOTES
[facility]  SPECIALIZING IN BREAKTHROUGHS  Radiation Oncology    On Treatment Visit Note      Duane C Johnson      Date: 2024   MRN: [id number]   : 1950  Diagnosis: prostate cancer      Reason for Visit:  On Radiation Treatment Visit     Treatment Summary to Date  Treatment Site: pelvis Current Dose: 1500/7000 cGy Fractions:       Chemotherapy  Chemo concurrent with radx?: No    Subjective:   Doing okay with respect to radiation treatment thus far.  No  bother.  No  bother.  Energy is okay.       Nursing ROS:   Nutrition Alteration  Diet Type: Patient's Preference  Skin  Skin Reaction: 0 - No changes     ENT and Mouth Exam  ENT/Mouth Note: no oral issues  Cardiovascular  Cardio/Resp Note: ? infected defibrillator?  f/u today with cardiology  Gastrointestinal  GI Note: bowels regular, no issues  Genitourinary   Note: no gu concerns,  rare nocturia     Pain Assessment  Pain Note: no  pain issues      Objective:   BP 98/54   Pulse 61   Resp 18   Wt 61.5 kg (135 lb 9.6 oz)   SpO2 98%   BMI 21.89 kg/m    No apparent distress    Labs:  CBC RESULTS:   Recent Labs   Lab Test 24  0909   WBC 5.6   RBC 3.71*   HGB 11.4*   HCT 34.4*   MCV 93   MCH 30.7   MCHC 33.1   RDW 14.6        ELECTROLYTES:  Recent Labs   Lab Test 24  0909   *   POTASSIUM 4.9   CHLORIDE 100   PRANAV 9.6   CO2 23   BUN 28.8*   CR 0.93   GLC 91       Assessment:  Mr. Aguila is a 74 year old male with significant cardiovascular disease diagnosed with [place] 4+3, PSA 4.61 unfavorable intermediate risk prostate cancer. He is undergoing definitive radiation therapy.    Tolerating radiation therapy well.  All questions and concerns addressed.    Plan:   Continue current therapy.        Mosaiq chart and setup information reviewed  Ports checked    Medication Review  Med list reviewed with patient?: Yes  Med Note: Lupron # 1 6 month dose, given 3/19/2024           Jasper Vallejo MD

## 2024-05-23 ENCOUNTER — APPOINTMENT (OUTPATIENT)
Dept: RADIATION THERAPY | Facility: OUTPATIENT CENTER | Age: 74
End: 2024-05-23
Payer: MEDICARE

## 2024-05-24 ENCOUNTER — APPOINTMENT (OUTPATIENT)
Dept: RADIATION THERAPY | Facility: OUTPATIENT CENTER | Age: 74
End: 2024-05-24
Payer: MEDICARE

## 2024-05-30 ENCOUNTER — ANCILLARY PROCEDURE (OUTPATIENT)
Dept: CARDIOLOGY | Facility: CLINIC | Age: 74
End: 2024-05-30
Attending: INTERNAL MEDICINE
Payer: MEDICARE

## 2024-05-30 DIAGNOSIS — Z95.810 ICD (IMPLANTABLE CARDIOVERTER-DEFIBRILLATOR) IN PLACE: ICD-10-CM

## 2024-05-30 DIAGNOSIS — I46.9 CARDIAC ARREST (H): ICD-10-CM

## 2024-05-30 DIAGNOSIS — I50.20 HEART FAILURE WITH REDUCED EJECTION FRACTION, NYHA CLASS I (H): ICD-10-CM

## 2024-05-30 DIAGNOSIS — I25.5 ISCHEMIC CARDIOMYOPATHY: ICD-10-CM

## 2024-05-31 ENCOUNTER — APPOINTMENT (OUTPATIENT)
Dept: RADIATION THERAPY | Facility: OUTPATIENT CENTER | Age: 74
End: 2024-05-31
Payer: MEDICARE

## 2024-06-03 ENCOUNTER — APPOINTMENT (OUTPATIENT)
Dept: RADIATION THERAPY | Facility: OUTPATIENT CENTER | Age: 74
End: 2024-06-03
Payer: MEDICARE

## 2024-06-03 LAB
MDC_IDC_LEAD_CONNECTION_STATUS: NORMAL
MDC_IDC_LEAD_CONNECTION_STATUS: NORMAL
MDC_IDC_LEAD_IMPLANT_DT: NORMAL
MDC_IDC_LEAD_IMPLANT_DT: NORMAL
MDC_IDC_LEAD_LOCATION: NORMAL
MDC_IDC_LEAD_LOCATION: NORMAL
MDC_IDC_LEAD_LOCATION_DETAIL_1: NORMAL
MDC_IDC_LEAD_LOCATION_DETAIL_1: NORMAL
MDC_IDC_LEAD_MFG: NORMAL
MDC_IDC_LEAD_MFG: NORMAL
MDC_IDC_LEAD_MODEL: NORMAL
MDC_IDC_LEAD_MODEL: NORMAL
MDC_IDC_LEAD_POLARITY_TYPE: NORMAL
MDC_IDC_LEAD_POLARITY_TYPE: NORMAL
MDC_IDC_LEAD_SERIAL: NORMAL
MDC_IDC_LEAD_SERIAL: NORMAL
MDC_IDC_LEAD_SPECIAL_FUNCTION: NORMAL
MDC_IDC_LEAD_SPECIAL_FUNCTION: NORMAL
MDC_IDC_PG_IMPLANT_DTM: NORMAL
MDC_IDC_PG_MFG: NORMAL
MDC_IDC_PG_MODEL: NORMAL
MDC_IDC_PG_SERIAL: NORMAL
MDC_IDC_PG_TYPE: NORMAL
MDC_IDC_SESS_CLINIC_NAME: NORMAL
MDC_IDC_SESS_DTM: NORMAL
MDC_IDC_SESS_TYPE: NORMAL

## 2024-06-04 ENCOUNTER — APPOINTMENT (OUTPATIENT)
Dept: RADIATION THERAPY | Facility: OUTPATIENT CENTER | Age: 74
End: 2024-06-04
Payer: MEDICARE

## 2024-06-05 ENCOUNTER — APPOINTMENT (OUTPATIENT)
Dept: RADIATION THERAPY | Facility: OUTPATIENT CENTER | Age: 74
End: 2024-06-05
Payer: MEDICARE

## 2024-06-05 ENCOUNTER — OFFICE VISIT (OUTPATIENT)
Dept: RADIATION THERAPY | Facility: OUTPATIENT CENTER | Age: 74
End: 2024-06-05
Payer: MEDICARE

## 2024-06-05 VITALS
HEART RATE: 61 BPM | BODY MASS INDEX: 22.27 KG/M2 | SYSTOLIC BLOOD PRESSURE: 93 MMHG | DIASTOLIC BLOOD PRESSURE: 56 MMHG | WEIGHT: 138 LBS

## 2024-06-05 DIAGNOSIS — C61 PROSTATE CANCER (H): Primary | ICD-10-CM

## 2024-06-05 NOTE — LETTER
2024      Duane C Johnson  [address] [address] [place] [address]      Dear Colleague,    Thank you for referring your patient, Duane C Johnson, to the  [facility]. Please see a copy of my visit note below.    [facility]  SPECIALIZING IN BREAKTHROUGHS  Radiation Oncology    On Treatment Visit Note      Duane C Johnson      Date: 2024   MRN: [id number]   : 1950  Diagnosis: prostate cancer      Reason for Visit:  On Radiation Treatment Visit     Treatment Summary to Date  Treatment Site: pelvis Current Dose: 3000/7000 cGy Fractions:       Chemotherapy  Chemo concurrent with radx?: No    Subjective:   Doing okay with respect to radiation treatment thus far.  No  bother.  No  bother.  Energy is okay.       Nursing ROS:   Nutrition Alteration  Diet Type: Patient's Preference  Skin  Skin Reaction: 0 - No changes     ENT and Mouth Exam  ENT/Mouth Note: no oral issues  Cardiovascular  Cardio/Resp Note: ? infected defibrillator?  f/u today with cardiology  Gastrointestinal  GI Note: bowels regular, no issues  Genitourinary   Note: no gu concerns,  rare nocturia     Pain Assessment  Pain Note: no  pain issues      Objective:   BP 93/56   Pulse 61   Wt 62.6 kg (138 lb)   BMI 22.27 kg/m    No apparent distress    Labs:  CBC RESULTS:   Recent Labs   Lab Test 24  0909   WBC 5.6   RBC 3.71*   HGB 11.4*   HCT 34.4*   MCV 93   MCH 30.7   MCHC 33.1   RDW 14.6        ELECTROLYTES:  Recent Labs   Lab Test 24  0909   *   POTASSIUM 4.9   CHLORIDE 100   PRANAV 9.6   CO2 23   BUN 28.8*   CR 0.93   GLC 91       Assessment:  Mr. Aguila is a 74 year old male with significant cardiovascular disease diagnosed with [place] 4+3, PSA 4.61 unfavorable intermediate risk prostate cancer. He is undergoing definitive radiation therapy.    Tolerating radiation therapy well.  All questions and concerns addressed.    Plan:   Continue current therapy.        Mosaiq chart  and setup information reviewed  Ports checked    Medication Review  Med list reviewed with patient?: Yes  Med Note: Lupron # 1 6 month dose, given 3/19/2024           Jasper Vallejo MD

## 2024-06-05 NOTE — PROGRESS NOTES
[facility]  SPECIALIZING IN BREAKTHROUGHS  Radiation Oncology    On Treatment Visit Note      Duane C Johnson      Date: 2024   MRN: [id number]   : 1950  Diagnosis: prostate cancer      Reason for Visit:  On Radiation Treatment Visit     Treatment Summary to Date  Treatment Site: pelvis Current Dose: 3000/7000 cGy Fractions:       Chemotherapy  Chemo concurrent with radx?: No    Subjective:   Doing okay with respect to radiation treatment thus far.  No  bother.  No  bother.  Energy is okay.       Nursing ROS:   Nutrition Alteration  Diet Type: Patient's Preference  Skin  Skin Reaction: 0 - No changes     ENT and Mouth Exam  ENT/Mouth Note: no oral issues  Cardiovascular  Cardio/Resp Note: ? infected defibrillator?  f/u today with cardiology  Gastrointestinal  GI Note: bowels regular, no issues  Genitourinary   Note: no gu concerns,  rare nocturia     Pain Assessment  Pain Note: no  pain issues      Objective:   BP 93/56   Pulse 61   Wt 62.6 kg (138 lb)   BMI 22.27 kg/m    No apparent distress    Labs:  CBC RESULTS:   Recent Labs   Lab Test 24  0909   WBC 5.6   RBC 3.71*   HGB 11.4*   HCT 34.4*   MCV 93   MCH 30.7   MCHC 33.1   RDW 14.6        ELECTROLYTES:  Recent Labs   Lab Test 24  0909   *   POTASSIUM 4.9   CHLORIDE 100   PRANAV 9.6   CO2 23   BUN 28.8*   CR 0.93   GLC 91       Assessment:  Mr. Aguila is a 74 year old male with significant cardiovascular disease diagnosed with [place] 4+3, PSA 4.61 unfavorable intermediate risk prostate cancer. He is undergoing definitive radiation therapy.    Tolerating radiation therapy well.  All questions and concerns addressed.    Plan:   Continue current therapy.        Mosaiq chart and setup information reviewed  Ports checked    Medication Review  Med list reviewed with patient?: Yes  Med Note: Lupron # 1 6 month dose, given 3/19/2024           Jasper Vallejo MD

## 2024-06-06 ENCOUNTER — APPOINTMENT (OUTPATIENT)
Dept: RADIATION THERAPY | Facility: OUTPATIENT CENTER | Age: 74
End: 2024-06-06
Payer: MEDICARE

## 2024-06-07 ENCOUNTER — APPOINTMENT (OUTPATIENT)
Dept: RADIATION THERAPY | Facility: OUTPATIENT CENTER | Age: 74
End: 2024-06-07
Payer: MEDICARE

## 2024-06-10 ENCOUNTER — APPOINTMENT (OUTPATIENT)
Dept: RADIATION THERAPY | Facility: OUTPATIENT CENTER | Age: 74
End: 2024-06-10
Payer: MEDICARE

## 2024-06-11 ENCOUNTER — APPOINTMENT (OUTPATIENT)
Dept: RADIATION THERAPY | Facility: OUTPATIENT CENTER | Age: 74
End: 2024-06-11
Payer: MEDICARE

## 2024-06-11 ENCOUNTER — ONCOLOGY VISIT (OUTPATIENT)
Dept: ONCOLOGY | Facility: CLINIC | Age: 74
End: 2024-06-11
Attending: NURSE PRACTITIONER
Payer: MEDICARE

## 2024-06-11 ENCOUNTER — INFUSION THERAPY VISIT (OUTPATIENT)
Dept: INFUSION THERAPY | Facility: CLINIC | Age: 74
End: 2024-06-11
Attending: INTERNAL MEDICINE
Payer: MEDICARE

## 2024-06-11 VITALS
DIASTOLIC BLOOD PRESSURE: 47 MMHG | TEMPERATURE: 97.8 F | BODY MASS INDEX: 22.28 KG/M2 | SYSTOLIC BLOOD PRESSURE: 82 MMHG | HEART RATE: 60 BPM | HEIGHT: 66 IN | OXYGEN SATURATION: 99 % | WEIGHT: 138.6 LBS

## 2024-06-11 DIAGNOSIS — C61 PROSTATE CANCER (H): Primary | ICD-10-CM

## 2024-06-11 DIAGNOSIS — I95.89 OTHER SPECIFIED HYPOTENSION: ICD-10-CM

## 2024-06-11 PROBLEM — I95.9 HYPOTENSION: Status: ACTIVE | Noted: 2024-06-11

## 2024-06-11 PROCEDURE — 96402 CHEMO HORMON ANTINEOPL SQ/IM: CPT

## 2024-06-11 PROCEDURE — G0463 HOSPITAL OUTPT CLINIC VISIT: HCPCS | Mod: 25 | Performed by: NURSE PRACTITIONER

## 2024-06-11 PROCEDURE — 99214 OFFICE O/P EST MOD 30 MIN: CPT | Performed by: NURSE PRACTITIONER

## 2024-06-11 PROCEDURE — G2211 COMPLEX E/M VISIT ADD ON: HCPCS | Performed by: NURSE PRACTITIONER

## 2024-06-11 PROCEDURE — 250N000011 HC RX IP 250 OP 636: Mod: JZ | Performed by: NURSE PRACTITIONER

## 2024-06-11 RX ADMIN — LEUPROLIDE ACETATE 22.5 MG: KIT at 14:19

## 2024-06-11 ASSESSMENT — PAIN SCALES - GENERAL: PAINLEVEL: NO PAIN (0)

## 2024-06-11 NOTE — PROGRESS NOTES
"Oncology Rooming Note    June 11, 2024 1:40 PM   Duane C Johnson is a 74 year old male who presents for:    Chief Complaint   Patient presents with    Oncology Clinic Visit     Prostate cancer - provider and infusion visit.     Initial Vitals: BP (!) 82/47 (BP Location: Right arm, Patient Position: Sitting, Cuff Size: Adult Regular)   Pulse 60   Temp 97.8  F (36.6  C) (Tympanic)   Ht 1.676 m (5' 6\")   Wt 62.9 kg (138 lb 9.6 oz)   SpO2 99%   BMI 22.37 kg/m   Estimated body mass index is 22.37 kg/m  as calculated from the following:    Height as of this encounter: 1.676 m (5' 6\").    Weight as of this encounter: 62.9 kg (138 lb 9.6 oz). Body surface area is 1.71 meters squared.  No Pain (0) Comment: Data Unavailable   No LMP for male patient.  Allergies reviewed: Yes  Medications reviewed: Yes    Medications: Medication refills not needed today.  Pharmacy name entered into [facility]: [facility] [place], [place] - [address] [place] [address]    Frailty Screening:   Is the patient here for a new oncology consult visit in cancer care? 2. No      Clinical concerns: None today.       Chelsey Steward MA              "

## 2024-06-11 NOTE — PROGRESS NOTES
[facility] H[facility]ology and Oncology Consult Note    Patient: Duane C Johnson  MRN: [id number]  Date of Service: Jun 11, 2024        Reason for Visit    Prostate cancer    Primary Oncologist: Dr. Youngblood      Assessment/Plan    # Localized prostate cancer, [place] 4+3 = 7, Grade group 3, Unfavorable intermediate risk  Not a surgical candidate with underlying cardiac comorbidities.     Initiated Lupron (every 3 mth depot) in March and receiving concurrent RT.    Tolerating Lupron very well, no side effects aside from some fatigue/slight brain fog.    Plan:  -Proceed with next Lupron injection. This is last planned injection to complete 6-mths duration.  -Continue surveillance with Dr. Vallejo in Rad Onc upon completion of radiation.  We can see as needed    # Hx CAD, STEMI, ischemic cardiomyopathy with CHF (EF 25%), HTN  # Hypotension  Followed in Cardiology. S/p stent to LAD 2023, on Eliquis and Plavix. Had implantable defibrillator placed.     BP 82/47 today, intermittently dizzy. Well-hydrated.   Base has been 90-100s/50-60s.     Plan:  -Monitor at home today. If BP<90, he will need to hold his metoprolol and lisinopril and update Cardiology (per the 5/20 visit recs)    ______________________________________________________________________________    Oncologic History     Cancer Staging   Prostate cancer (H)  Staging form: Prostate, AJCC 8th Edition  - Clinical: Stage IIC (cT2c, cN0, cM0, PSA: 4.6, Grade Group: 3) - Signed by Deana Schuster NP on 4/16/2024 9/2023: Stage IIC ([id number]-gW6- cM0) prostate cancer, unfavorable intermediate risk  -Elevated PSA 4.61  -6/2023 MRI prostate: PI RADS 4 lesion in R mid gland peripheral zone; low likelihood of minimal extraprostatic extension. 2 other additional PI RADS 4 lesions.  No evidence of direct invasion of the neurovascular bundle.  No evidence of any pelvic adenopathy.   -9/2023 prostate biopsy: prostatic adenocarcinoma [place] 4+3 equal 7. Cores from bilateral  prostate sites.     -2/2024 restaging MR abd/pelvis and bone scan: no mets. PSA 3.8    Treatment:  -initial plan was for prostatectomy, but he had STEMI/CHF complications ahead of surgery delaying treatment and was then deemed a poor surgical candidate.     -3/19/2024: Lupron every 12 weeks (planning 6 mths). Concurrent RT to start 5/2024    History of presenting illness:  Duane is a 75 yo with localized prostate cancer, poor surgical candidate (cardiac history). He initiated Lupron in March and returns ahead of next injection.   He's in the middle of concurrent RT.     Tolerating Lupron well, has noted no side effects at all. No hot flashes.   Mild fatigue.     Some mild urinary incontinence since on radiation. No other  symptoms.  No new pain.     Intermittent dizziness. BP at home has typically been 100s/60s. Drinking fluids well.       ECOG Performance  1 - Can't do physically strenuous work, but fully ambyulatory and can do light sedentary work      Physical Exam        6/11/2024     1:47 PM   Oncology Vitals   Height 168 cm   Weight 62.869 kg   BSA (m2) 1.71 m2   BP 82/47   Pulse 60   Temp 97.8  F (36.6  C)   Temp src Tympanic   SpO2 99 %   Pain Score 0 (None)       General: alert and cooperative. Alone.   HEENT: Head: Normal, normocephalic, atraumatic.  Eye: Normal external eye, conjunctiva, lids cornea, DOMINICK.  Chest: Regular respiratory rate  Extremities: atraumatic, no peripheral edema  CNS: Alert and oriented x3, neurologic exam grossly normal.    Lab Results    No results found for this or any previous visit (from the past 168 hour(s)).      Imaging Results    Cardiac Device Check - In Clinic    Result Date: 6/3/2024  Patient came to clinic for a 1 week follow up after restarting his anticoagulant. Hematoma measured the same size as before and has not grown. Coloring is also similar. Please see attached picture in EPIC. Patient told to call the clinic if he notices the hematoma grow in size. EH.      Cardiac Device Check - In Clinic    Result Date: 5/22/2024  [place] [facility] [place] to assess incision site. No drainage or redness, incision site intact. Hematoma still present, soft to the touch, and has not changed in size since last assessment on 5/15/2024. Patient was instructed to restarted his anticoagulant and agreed to return to clinic on 5/30/2024 for follow-up w/ a Device RN to ensure hematoma is not growing while on his anticoagulant.     Cardiac Device Check - In Clinic    Result Date: 5/16/2024  [place] [facility] [place] evaluation and iterative programming of their ICD per MD orders. Patient is s/p ICD implant on 5/8/2024. Steristrips removed without difficulty. Dermabond is intact. Incision is well approximated without redness or drainage, he does however have a moderate hematoma measuring 11 cm x 9.5 cm x 4 cm.  He denies drainage, fevers, or pain.  The hematoma is taught but the edges are soft and non tender to the touch.  Oral temp today 98.6 F. Device: [employer] D533 RESONATE HF ICD Normal Device Function Mode: DDD  bpm AP: 47% : 1% Intrinsic rhythm: AS-VS 55 bpm Lead Trends Appear Stable. Estimated battery longevity to RRT = 13 years. Atrial Arrhythmia: None Ventricular Arrhythmia: None Setting Changes: Eliquis Patient presented to the [place] ED on 5/13/24 to have his hematoma checked, his antibiotics were extended for 5 more days at that visit.  He called yesterday, wondering what he should do next, Jacqui Snell RN/ Lu Parrish, had him hold his Eliquis until his appt today.  EP fellow, Renee Reynolds MD agrees with the plan. Plan: RTC 5/21/24 @ 11:30 AM, he will come directly from his radiation therapy appointment in [place] that day.  We will have him hold his Eliquis until we see him back in clinic on 5/21/24. Lu Conteh RN Dual lead ICD I have reviewed and interpreted the device interrogation, settings, programming and nurse's summary. The device is functioning  within normal device parameters. I agree with the current findings, assessment and plan.     Total time 30 minutes, to include face to face visit, review of EMR, ordering, documentation and coordination of care on date of service.    complexity modifier for longitudinal care.       Signed by: Deana Schuster, NP

## 2024-06-11 NOTE — LETTER
6/11/2024      Duane C Johnson  [address] [address] [place] [address]      Dear Colleague,    Thank you for referring your patient, Duane C Johnson, to the [facility] [place]. Please see a copy of my visit note below.    [facility] Hematology and Oncology Consult Note    Patient: Duane C Johnson  MRN: [id number]  Date of Service: Jun 11, 2024        Reason for Visit    Prostate cancer    Primary Oncologist: Dr. Youngblood      Assessment/Plan    # Localized prostate cancer, [place] 4+3 = 7, Grade group 3, Unfavorable intermediate risk  Not a surgical candidate with underlying cardiac comorbidities.     Initiated Lupron (every 3 mth depot) in March and receiving concurrent RT.    Tolerating Lupron very well, no side effects aside from some fatigue/slight brain fog.    Plan:  -Proceed with next Lupron injection. This is last planned injection to complete 6-mths duration.  -Continue surveillance with Dr. Vallejo in Rad Onc upon completion of radiation.  We can see as needed    # Hx CAD, STEMI, ischemic cardiomyopathy with CHF (EF 25%), HTN  # Hypotension  Followed in Cardiology. S/p stent to LAD 2023, on Eliquis and Plavix. Had implantable defibrillator placed.     BP 82/47 today, intermittently dizzy. Well-hydrated.   Base has been 90-100s/50-60s.     Plan:  -Monitor at home today. If BP<90, he will need to hold his metoprolol and lisinopril and update Cardiology (per the 5/20 visit recs)    ______________________________________________________________________________    Oncologic History     Cancer Staging   Prostate cancer (H)  Staging form: Prostate, AJCC 8th Edition  - Clinical: Stage IIC (cT2c, cN0, cM0, PSA: 4.6, Grade Group: 3) - Signed by Deana Schuster NP on 4/16/2024 9/2023: Stage IIC ([id number]-zO0- cM0) prostate cancer, unfavorable intermediate risk  -Elevated PSA 4.61  -6/2023 MRI prostate: PI RADS 4 lesion in R mid gland peripheral zone; low likelihood of minimal extraprostatic  extension. 2 other additional PI RADS 4 lesions.  No evidence of direct invasion of the neurovascular bundle.  No evidence of any pelvic adenopathy.   -9/2023 prostate biopsy: prostatic adenocarcinoma [place] 4+3 equal 7. Cores from bilateral prostate sites.     -2/2024 restaging MR abd/pelvis and bone scan: no mets. PSA 3.8    Treatment:  -initial plan was for prostatectomy, but he had STEMI/CHF complications ahead of surgery delaying treatment and was then deemed a poor surgical candidate.     -3/19/2024: Lupron every 12 weeks (planning 6 mths). Concurrent RT to start 5/2024    History of presenting illness:  Duane is a 75 yo with localized prostate cancer, poor surgical candidate (cardiac history). He initiated Lupron in March and returns ahead of next injection.   He's in the middle of concurrent RT.     Tolerating Lupron well, has noted no side effects at all. No hot flashes.   Mild fatigue.     Some mild urinary incontinence since on radiation. No other  symptoms.  No new pain.     Intermittent dizziness. BP at home has typically been 100s/60s. Drinking fluids well.       ECOG Performance  1 - Can't do physically strenuous work, but fully ambyulatory and can do light sedentary work      Physical Exam        6/11/2024     1:47 PM   Oncology Vitals   Height 168 cm   Weight 62.869 kg   BSA (m2) 1.71 m2   BP 82/47   Pulse 60   Temp 97.8  F (36.6  C)   Temp src Tympanic   SpO2 99 %   Pain Score 0 (None)       General: alert and cooperative. Alone.   HEENT: Head: Normal, normocephalic, atraumatic.  Eye: Normal external eye, conjunctiva, lids cornea, DOMINICK.  Chest: Regular respiratory rate  Extremities: atraumatic, no peripheral edema  CNS: Alert and oriented x3, neurologic exam grossly normal.    Lab Results    No results found for this or any previous visit (from the past 168 hour(s)).      Imaging Results    Cardiac Device Check - In Clinic    Result Date: 6/3/2024  Patient came to clinic for a 1 week follow up  after restarting his anticoagulant. Hematoma measured the same size as before and has not grown. Coloring is also similar. Please see attached picture in EPIC. Patient told to call the clinic if he notices the hematoma grow in size. EH.     Cardiac Device Check - In Clinic    Result Date: 5/22/2024  Pt seen in [facility] clinic to assess incision site. No drainage or redness, incision site intact. Hematoma still present, soft to the touch, and has not changed in size since last assessment on 5/15/2024. Patient was instructed to restarted his anticoagulant and agreed to return to clinic on 5/30/2024 for follow-up w/ a Device RN to ensure hematoma is not growing while on his anticoagulant.     Cardiac Device Check - In Clinic    Result Date: 5/16/2024  Patient seen in [facility] for evaluation and iterative programming of their ICD per MD orders. Patient is s/p ICD implant on 5/8/2024. Steristrips removed without difficulty. Dermabond is intact. Incision is well approximated without redness or drainage, he does however have a moderate hematoma measuring 11 cm x 9.5 cm x 4 cm.  He denies drainage, fevers, or pain.  The hematoma is taught but the edges are soft and non tender to the touch.  Oral temp today 98.6 F. Device: [employer] D533 RESONATE HF ICD Normal Device Function Mode: DDD  bpm AP: 47% : 1% Intrinsic rhythm: AS-VS 55 bpm Lead Trends Appear Stable. Estimated battery longevity to RRT = 13 years. Atrial Arrhythmia: None Ventricular Arrhythmia: None Setting Changes: Eliquis Patient presented to the [place] ED on 5/13/24 to have his hematoma checked, his antibiotics were extended for 5 more days at that visit.  He called yesterday, wondering what he should do next, Jacqui Snell RN/ Lu Gonzalez APRN, had him hold his Eliquis until his appt today.  EP fellow, Renee Reynolds MD agrees with the plan. Plan: RTC 5/21/24 @ 11:30 AM, he will come directly from his radiation therapy appointment in [place] that day.   "We will have him hold his Eliquis until we see him back in clinic on 5/21/24. Lu Conteh, RN Dual lead ICD I have reviewed and interpreted the device interrogation, settings, programming and nurse's summary. The device is functioning within normal device parameters. I agree with the current findings, assessment and plan.     Total time 30 minutes, to include face to face visit, review of EMR, ordering, documentation and coordination of care on date of service.    complexity modifier for longitudinal care.       Signed by: Deana Schuster NP      Oncology Rooming Note    June 11, 2024 1:40 PM   Duane C Johnson is a 74 year old male who presents for:    Chief Complaint   Patient presents with     Oncology Clinic Visit     Prostate cancer - provider and infusion visit.     Initial Vitals: BP (!) 82/47 (BP Location: Right arm, Patient Position: Sitting, Cuff Size: Adult Regular)   Pulse 60   Temp 97.8  F (36.6  C) (Tympanic)   Ht 1.676 m (5' 6\")   Wt 62.9 kg (138 lb 9.6 oz)   SpO2 99%   BMI 22.37 kg/m   Estimated body mass index is 22.37 kg/m  as calculated from the following:    Height as of this encounter: 1.676 m (5' 6\").    Weight as of this encounter: 62.9 kg (138 lb 9.6 oz). Body surface area is 1.71 meters squared.  No Pain (0) Comment: Data Unavailable   No LMP for male patient.  Allergies reviewed: Yes  Medications reviewed: Yes    Medications: Medication refills not needed today.  Pharmacy name entered into [facility]: [facility] [place], [place] - [address] [place] [address]    Frailty Screening:   Is the patient here for a new oncology consult visit in cancer care? 2. No      Clinical concerns: None today.       Chelsey Steward MA                Again, thank you for allowing me to participate in the care of your patient.        Sincerely,        Deana Schuster NP  "

## 2024-06-11 NOTE — PROGRESS NOTES
Infusion Nursing Note:  Duane C Johnson presents today for Lupron injection.    Patient seen by provider today: Yes: Deana Schuster NP   present during visit today: Not Applicable.    Note: N/A.      Intravenous Access:  No Intravenous access/labs at this visit.    Treatment Conditions:  Not Applicable.      Post Infusion Assessment:  Patient tolerated injection without incident.       Discharge Plan:   Patient discharged in stable condition accompanied by: self.  Departure Mode: Ambulatory.      Namrata Vaca RN

## 2024-06-12 ENCOUNTER — APPOINTMENT (OUTPATIENT)
Dept: RADIATION THERAPY | Facility: OUTPATIENT CENTER | Age: 74
End: 2024-06-12
Payer: MEDICARE

## 2024-06-12 ENCOUNTER — OFFICE VISIT (OUTPATIENT)
Dept: RADIATION THERAPY | Facility: OUTPATIENT CENTER | Age: 74
End: 2024-06-12
Payer: MEDICARE

## 2024-06-12 VITALS
WEIGHT: 140 LBS | BODY MASS INDEX: 22.6 KG/M2 | DIASTOLIC BLOOD PRESSURE: 59 MMHG | HEART RATE: 99 BPM | SYSTOLIC BLOOD PRESSURE: 102 MMHG

## 2024-06-12 DIAGNOSIS — C61 PROSTATE CANCER (H): Primary | ICD-10-CM

## 2024-06-12 NOTE — PROGRESS NOTES
[facility]  SPECIALIZING IN BREAKTHROUGHS  Radiation Oncology    On Treatment Visit Note      Duane C Johnson      Date: 2024   MRN: [id number]   : 1950  Diagnosis: prostate cancer      Reason for Visit:  On Radiation Treatment Visit     Treatment Summary to Date  Treatment Site: pelvis Current Dose: 6250/7000 cGy Fractions:       Chemotherapy  Chemo concurrent with radx?: No    Subjective:   Doing okay with respect to radiation treatment thus far.  No  bother.  No  bother.  Energy is okay.       Nursing ROS:   Nutrition Alteration  Diet Type: Patient's Preference  Skin  Skin Reaction: 0 - No changes     ENT and Mouth Exam  ENT/Mouth Note: no oral issues  Cardiovascular  Cardio/Resp Note: ? infected defibrillator?  f/u today with cardiology  Gastrointestinal  GI Note: bowels regular, no issues  Genitourinary   Note: no gu concerns,  rare nocturia     Pain Assessment  Pain Note: no  pain issues      Objective:   /59   Pulse 99   Wt 63.5 kg (140 lb)   BMI 22.60 kg/m    No apparent distress    Labs:  CBC RESULTS:   Recent Labs   Lab Test 24  0909   WBC 5.6   RBC 3.71*   HGB 11.4*   HCT 34.4*   MCV 93   MCH 30.7   MCHC 33.1   RDW 14.6        ELECTROLYTES:  Recent Labs   Lab Test 24  0909   *   POTASSIUM 4.9   CHLORIDE 100   PRANAV 9.6   CO2 23   BUN 28.8*   CR 0.93   GLC 91       Assessment:  Mr. Aguila is a 74 year old male with significant cardiovascular disease diagnosed with [place] 4+3, PSA 4.61 unfavorable intermediate risk prostate cancer. He is undergoing definitive radiation therapy.    Tolerating radiation therapy well.  All questions and concerns addressed.    Plan:   Continue current therapy.        Mosaiq chart and setup information reviewed  Ports checked    Medication Review  Med list reviewed with patient?: Yes  Med Note: Lupron # 1 6 month dose, given 3/19/2024           Jasper Vallejo MD

## 2024-06-12 NOTE — LETTER
2024      Duane C Johnson  [address] [address] [place] [id number]      Dear Colleague,    Thank you for referring your patient, Duane C Johnson, to the  [facility]. Please see a copy of my visit note below.    [facility]  SPECIALIZING IN BREAKTHROUGHS  Radiation Oncology    On Treatment Visit Note      Duane C Johnson      Date: 2024   MRN: [id number]   : 1950  Diagnosis: prostate cancer      Reason for Visit:  On Radiation Treatment Visit     Treatment Summary to Date  Treatment Site: pelvis Current Dose: 6250/7000 cGy Fractions:       Chemotherapy  Chemo concurrent with radx?: No    Subjective:   Doing okay with respect to radiation treatment thus far.  No  bother.  No  bother.  Energy is okay.       Nursing ROS:   Nutrition Alteration  Diet Type: Patient's Preference  Skin  Skin Reaction: 0 - No changes     ENT and Mouth Exam  ENT/Mouth Note: no oral issues  Cardiovascular  Cardio/Resp Note: ? infected defibrillator?  f/u today with cardiology  Gastrointestinal  GI Note: bowels regular, no issues  Genitourinary   Note: no gu concerns,  rare nocturia     Pain Assessment  Pain Note: no  pain issues      Objective:   /59   Pulse 99   Wt 63.5 kg (140 lb)   BMI 22.60 kg/m    No apparent distress    Labs:  CBC RESULTS:   Recent Labs   Lab Test 24  0909   WBC 5.6   RBC 3.71*   HGB 11.4*   HCT 34.4*   MCV 93   MCH 30.7   MCHC 33.1   RDW 14.6        ELECTROLYTES:  Recent Labs   Lab Test 24  0909   *   POTASSIUM 4.9   CHLORIDE 100   PRANAV 9.6   CO2 23   BUN 28.8*   CR 0.93   GLC 91       Assessment:  Mr. Aguila is a 74 year old male with significant cardiovascular disease diagnosed with [place] 4+3, PSA 4.61 unfavorable intermediate risk prostate cancer. He is undergoing definitive radiation therapy.    Tolerating radiation therapy well.  All questions and concerns addressed.    Plan:   Continue current therapy.        Kaz  chart and setup information reviewed  Ports checked    Medication Review  Med list reviewed with patient?: Yes  Med Note: Lupron # 1 6 month dose, given 3/19/2024           Jasper Vallejo MD

## 2024-06-13 ENCOUNTER — APPOINTMENT (OUTPATIENT)
Dept: RADIATION THERAPY | Facility: OUTPATIENT CENTER | Age: 74
End: 2024-06-13
Payer: MEDICARE

## 2024-06-14 ENCOUNTER — APPOINTMENT (OUTPATIENT)
Dept: RADIATION THERAPY | Facility: OUTPATIENT CENTER | Age: 74
End: 2024-06-14
Payer: MEDICARE

## 2024-06-17 ENCOUNTER — APPOINTMENT (OUTPATIENT)
Dept: RADIATION THERAPY | Facility: OUTPATIENT CENTER | Age: 74
End: 2024-06-17
Payer: MEDICARE

## 2024-06-18 ENCOUNTER — APPOINTMENT (OUTPATIENT)
Dept: RADIATION THERAPY | Facility: OUTPATIENT CENTER | Age: 74
End: 2024-06-18
Payer: MEDICARE

## 2024-06-19 ENCOUNTER — APPOINTMENT (OUTPATIENT)
Dept: RADIATION THERAPY | Facility: OUTPATIENT CENTER | Age: 74
End: 2024-06-19
Payer: MEDICARE

## 2024-06-20 ENCOUNTER — OFFICE VISIT (OUTPATIENT)
Dept: RADIATION THERAPY | Facility: OUTPATIENT CENTER | Age: 74
End: 2024-06-20
Payer: MEDICARE

## 2024-06-20 ENCOUNTER — APPOINTMENT (OUTPATIENT)
Dept: RADIATION THERAPY | Facility: OUTPATIENT CENTER | Age: 74
End: 2024-06-20
Payer: MEDICARE

## 2024-06-20 DIAGNOSIS — C61 PROSTATE CANCER (H): Primary | ICD-10-CM

## 2024-06-20 NOTE — PROGRESS NOTES
[facility]  SPECIALIZING IN BREAKTHROUGHS  Radiation Oncology    On Treatment Visit Note      Duane C Johnson      Date: 2024   MRN: [id number]   : 1950  Diagnosis: prostate cancer      Reason for Visit:  On Radiation Treatment Visit     Treatment Summary to Date  Treatment Site: pelvis Current Dose: 5750/7000 cGy Fractions:       Chemotherapy  Chemo concurrent with radx?: No    Subjective:   Doing okay with respect to radiation treatment thus far.  Mild GI  bother.  Mild  bother.  Energy is okay.       Nursing ROS:   Nutrition Alteration  Diet Type: Patient's Preference  Skin  Skin Reaction: 0 - No changes     ENT and Mouth Exam  ENT/Mouth Note: no oral issues  Cardiovascular  Cardio/Resp Note: ? infected defibrillator?  f/u today with cardiology  Gastrointestinal  GI Note: bowels regular, no issues  Genitourinary   Note: no gu concerns,  rare nocturia     Pain Assessment  Pain Note: no  pain issues      Objective:   There were no vitals taken for this visit.  No apparent distress    Labs:  CBC RESULTS:   Recent Labs   Lab Test 24  0909   WBC 5.6   RBC 3.71*   HGB 11.4*   HCT 34.4*   MCV 93   MCH 30.7   MCHC 33.1   RDW 14.6        ELECTROLYTES:  Recent Labs   Lab Test 24  0909   *   POTASSIUM 4.9   CHLORIDE 100   PRANAV 9.6   CO2 23   BUN 28.8*   CR 0.93   GLC 91       Assessment:  Mr. Aguila is a 74 year old male with significant cardiovascular disease diagnosed with [place] 4+3, PSA 4.61 unfavorable intermediate risk prostate cancer. He is undergoing definitive radiation therapy.    Tolerating radiation therapy well.  All questions and concerns addressed.    Plan:   Continue current therapy.    2.   GI  bother. Low fiber diet. Imodium as needed.    Mosaiq chart and setup information reviewed  Ports checked    Medication Review  Med list reviewed with patient?: Yes  Med Note: Lupron # 1 6 month dose, given 3/19/2024           Jasper Vallejo MD

## 2024-06-20 NOTE — LETTER
2024      Duane C Johnson  [address] [address] [place] [id number]      Dear Colleague,    Thank you for referring your patient, Duane C Johnson, to the  [facility]. Please see a copy of my visit note below.    [facility]  SPECIALIZING IN BREAKTHROUGHS  Radiation Oncology    On Treatment Visit Note      Duane C Johnson      Date: 2024   MRN: [id number]   : 1950  Diagnosis: prostate cancer      Reason for Visit:  On Radiation Treatment Visit     Treatment Summary to Date  Treatment Site: pelvis Current Dose: 5750/7000 cGy Fractions:       Chemotherapy  Chemo concurrent with radx?: No    Subjective:   Doing okay with respect to radiation treatment thus far.  Mild GI  bother.  Mild  bother.  Energy is okay.       Nursing ROS:   Nutrition Alteration  Diet Type: Patient's Preference  Skin  Skin Reaction: 0 - No changes     ENT and Mouth Exam  ENT/Mouth Note: no oral issues  Cardiovascular  Cardio/Resp Note: ? infected defibrillator?  f/u today with cardiology  Gastrointestinal  GI Note: bowels regular, no issues  Genitourinary   Note: no gu concerns,  rare nocturia     Pain Assessment  Pain Note: no  pain issues      Objective:   There were no vitals taken for this visit.  No apparent distress    Labs:  CBC RESULTS:   Recent Labs   Lab Test 24  0909   WBC 5.6   RBC 3.71*   HGB 11.4*   HCT 34.4*   MCV 93   MCH 30.7   MCHC 33.1   RDW 14.6        ELECTROLYTES:  Recent Labs   Lab Test 24  0909   *   POTASSIUM 4.9   CHLORIDE 100   PRANAV 9.6   CO2 23   BUN 28.8*   CR 0.93   GLC 91       Assessment:  Mr. Aguila is a 74 year old male with significant cardiovascular disease diagnosed with [place] 4+3, PSA 4.61 unfavorable intermediate risk prostate cancer. He is undergoing definitive radiation therapy.    Tolerating radiation therapy well.  All questions and concerns addressed.    Plan:   Continue current therapy.    2.   GI  bother. Low fiber  diet. Imodium as needed.    Mosaiq chart and setup information reviewed  Ports checked    Medication Review  Med list reviewed with patient?: Yes  Med Note: Lupron # 1 6 month dose, given 3/19/2024          Again, thank you for allowing me to participate in the care of your patient.      Sincerely,    Jasper Vallejo MD

## 2024-06-21 ENCOUNTER — APPOINTMENT (OUTPATIENT)
Dept: RADIATION THERAPY | Facility: OUTPATIENT CENTER | Age: 74
End: 2024-06-21
Payer: MEDICARE

## 2024-06-24 ENCOUNTER — APPOINTMENT (OUTPATIENT)
Dept: RADIATION THERAPY | Facility: OUTPATIENT CENTER | Age: 74
End: 2024-06-24
Payer: MEDICARE

## 2024-06-25 ENCOUNTER — APPOINTMENT (OUTPATIENT)
Dept: RADIATION THERAPY | Facility: OUTPATIENT CENTER | Age: 74
End: 2024-06-25
Payer: MEDICARE

## 2024-06-26 ENCOUNTER — APPOINTMENT (OUTPATIENT)
Dept: RADIATION THERAPY | Facility: OUTPATIENT CENTER | Age: 74
End: 2024-06-26
Payer: MEDICARE

## 2024-06-26 ENCOUNTER — OFFICE VISIT (OUTPATIENT)
Dept: RADIATION THERAPY | Facility: OUTPATIENT CENTER | Age: 74
End: 2024-06-26
Payer: MEDICARE

## 2024-06-26 VITALS
RESPIRATION RATE: 18 BRPM | BODY MASS INDEX: 22.53 KG/M2 | HEART RATE: 59 BPM | WEIGHT: 139.6 LBS | OXYGEN SATURATION: 99 % | SYSTOLIC BLOOD PRESSURE: 105 MMHG | DIASTOLIC BLOOD PRESSURE: 67 MMHG

## 2024-06-26 DIAGNOSIS — I50.20 HEART FAILURE WITH REDUCED EJECTION FRACTION, NYHA CLASS I (H): Primary | ICD-10-CM

## 2024-06-26 DIAGNOSIS — C61 PROSTATE CANCER (H): Primary | ICD-10-CM

## 2024-06-26 ASSESSMENT — PAIN SCALES - GENERAL: PAINLEVEL: NO PAIN (0)

## 2024-06-26 NOTE — PROGRESS NOTES
[facility]  SPECIALIZING IN BREAKTHROUGHS  Radiation Oncology    On Treatment Visit Note      Duane C Johnson      Date: 2024   MRN: [id number]   : 1950  Diagnosis: prostate cancer      Reason for Visit:  On Radiation Treatment Visit     Treatment Summary to Date  Treatment Site: pelvis Current Dose: 6750/7000 cGy Fractions:       Chemotherapy  Chemo concurrent with radx?: No    Subjective:   Doing okay with respect to radiation treatment thus far.  Mild GI  bother.  Mild  bother.  Energy is okay.       Nursing ROS:   Nutrition Alteration  Diet Type: Patient's Preference  Skin  Skin Reaction: 0 - No changes     ENT and Mouth Exam  ENT/Mouth Note: no oral issues  Cardiovascular  Cardio/Resp Note: ? infected defibrillator?  f/u today with cardiology  Gastrointestinal  GI Note: bowels regular, no issues  Genitourinary   Note: no gu concerns,  rare nocturia     Pain Assessment  Pain Note: no  pain issues      Objective:   There were no vitals taken for this visit.  No apparent distress    Labs:  CBC RESULTS:   Recent Labs   Lab Test 24  0909   WBC 5.6   RBC 3.71*   HGB 11.4*   HCT 34.4*   MCV 93   MCH 30.7   MCHC 33.1   RDW 14.6        ELECTROLYTES:  Recent Labs   Lab Test 24  0909   *   POTASSIUM 4.9   CHLORIDE 100   PRANAV 9.6   CO2 23   BUN 28.8*   CR 0.93   GLC 91       Assessment:  Mr. Aguila is a 74 year old male with significant cardiovascular disease diagnosed with [place] 4+3, PSA 4.61 unfavorable intermediate risk prostate cancer. He is undergoing definitive radiation therapy.    Tolerating radiation therapy well.  All questions and concerns addressed.    Plan:   Continue current therapy.  EOT tomorrow.  Return to clinic in 1 month with PSA.  Will see PA.  2.   GI  bother. Low fiber diet. Imodium as needed.    Mosaiq chart and setup information reviewed  Ports checked    Medication Review  Med list reviewed with patient?: Yes  Med Note: Lupron # 1 6 month  dose, given 3/19/2024           Jasper Vallejo MD

## 2024-06-26 NOTE — LETTER
2024      Duane C Johnson  [address] [address] [place] [id number]      Dear Colleague,    Thank you for referring your patient, Duane C Johnson, to the  [facility]. Please see a copy of my visit note below.    [facility]  SPECIALIZING IN BREAKTHROUGHS  Radiation Oncology    On Treatment Visit Note      Duane C Johnson      Date: 2024   MRN: [id number]   : 1950  Diagnosis: prostate cancer      Reason for Visit:  On Radiation Treatment Visit     Treatment Summary to Date  Treatment Site: pelvis Current Dose: 6750/7000 cGy Fractions:       Chemotherapy  Chemo concurrent with radx?: No    Subjective:   Doing okay with respect to radiation treatment thus far.  Mild GI  bother.  Mild  bother.  Energy is okay.       Nursing ROS:   Nutrition Alteration  Diet Type: Patient's Preference  Skin  Skin Reaction: 0 - No changes     ENT and Mouth Exam  ENT/Mouth Note: no oral issues  Cardiovascular  Cardio/Resp Note: ? infected defibrillator?  f/u today with cardiology  Gastrointestinal  GI Note: bowels regular, no issues  Genitourinary   Note: no gu concerns,  rare nocturia     Pain Assessment  Pain Note: no  pain issues      Objective:   There were no vitals taken for this visit.  No apparent distress    Labs:  CBC RESULTS:   Recent Labs   Lab Test 24  0909   WBC 5.6   RBC 3.71*   HGB 11.4*   HCT 34.4*   MCV 93   MCH 30.7   MCHC 33.1   RDW 14.6        ELECTROLYTES:  Recent Labs   Lab Test 24  0909   *   POTASSIUM 4.9   CHLORIDE 100   PRANAV 9.6   CO2 23   BUN 28.8*   CR 0.93   GLC 91       Assessment:  Mr. Aguila is a 74 year old male with significant cardiovascular disease diagnosed with [place] 4+3, PSA 4.61 unfavorable intermediate risk prostate cancer. He is undergoing definitive radiation therapy.    Tolerating radiation therapy well.  All questions and concerns addressed.    Plan:   Continue current therapy.  EOT tomorrow.  Return to clinic  in 1 month with PSA.  Will see PA.  2.   GI  bother. Low fiber diet. Imodium as needed.    Mosaiq chart and setup information reviewed  Ports checked    Medication Review  Med list reviewed with patient?: Yes  Med Note: Lupron # 1 6 month dose, given 3/19/2024           Jasper Vallejo MD

## 2024-06-27 ENCOUNTER — TELEPHONE (OUTPATIENT)
Dept: CARDIOLOGY | Facility: CLINIC | Age: 74
End: 2024-06-27
Payer: MEDICARE

## 2024-06-27 ENCOUNTER — APPOINTMENT (OUTPATIENT)
Dept: RADIATION THERAPY | Facility: OUTPATIENT CENTER | Age: 74
End: 2024-06-27
Payer: MEDICARE

## 2024-06-27 NOTE — TELEPHONE ENCOUNTER
6/27/2024 5:43PM Eve Maguire  Patient confirmed scheduled appointment:  Date: 6/29/2024  Time: 4:15PM  Visit type: labs  Provider: per Dr. Cruz  Location: [address] [place] [id number]   Testing/imaging: NA  Additional notes: 6/27 Scheduled labs in [place] 6/29 per Dr. Cruz. JEAN PIERRE Maguire 6/27/2024 5:43PM

## 2024-06-27 NOTE — TELEPHONE ENCOUNTER
----- Message from Chelsey WATERMAN sent at 6/26/2024  8:53 AM CDT -----  Regarding: lab appt  Hi - patient has a virtual appt with Dr Cruz 7/5, can you call patient or wife (she does most the scheduling, but calling the home line usually gets both of them) to schedule a lab appt a day or two prior? Either [place] or [place]. If [place], could be that same morning, they result labs the same day.   Thanks,   Nette

## 2024-06-29 ENCOUNTER — LAB (OUTPATIENT)
Dept: LAB | Facility: CLINIC | Age: 74
End: 2024-06-29
Payer: MEDICARE

## 2024-06-29 DIAGNOSIS — I50.20 HEART FAILURE WITH REDUCED EJECTION FRACTION, NYHA CLASS I (H): ICD-10-CM

## 2024-06-29 LAB
ANION GAP SERPL CALCULATED.3IONS-SCNC: 9 MMOL/L (ref 7–15)
BUN SERPL-MCNC: 28.5 MG/DL (ref 8–23)
CALCIUM SERPL-MCNC: 9.4 MG/DL (ref 8.8–10.2)
CHLORIDE SERPL-SCNC: 100 MMOL/L (ref 98–107)
CREAT SERPL-MCNC: 0.98 MG/DL (ref 0.67–1.17)
DEPRECATED HCO3 PLAS-SCNC: 27 MMOL/L (ref 22–29)
EGFRCR SERPLBLD CKD-EPI 2021: 81 ML/MIN/1.73M2
GLUCOSE SERPL-MCNC: 96 MG/DL (ref 70–99)
MAGNESIUM SERPL-MCNC: 1.9 MG/DL (ref 1.7–2.3)
NT-PROBNP SERPL-MCNC: 2451 PG/ML (ref 0–900)
POTASSIUM SERPL-SCNC: 3.8 MMOL/L (ref 3.4–5.3)
SODIUM SERPL-SCNC: 136 MMOL/L (ref 135–145)

## 2024-06-29 PROCEDURE — 83880 ASSAY OF NATRIURETIC PEPTIDE: CPT

## 2024-06-29 PROCEDURE — 36415 COLL VENOUS BLD VENIPUNCTURE: CPT

## 2024-06-29 PROCEDURE — 80048 BASIC METABOLIC PNL TOTAL CA: CPT

## 2024-06-29 PROCEDURE — 83735 ASSAY OF MAGNESIUM: CPT

## 2024-07-05 ENCOUNTER — VIRTUAL VISIT (OUTPATIENT)
Dept: CARDIOLOGY | Facility: CLINIC | Age: 74
End: 2024-07-05
Attending: INTERNAL MEDICINE
Payer: MEDICARE

## 2024-07-05 VITALS — WEIGHT: 132.1 LBS | BODY MASS INDEX: 21.32 KG/M2

## 2024-07-05 DIAGNOSIS — I25.5 ISCHEMIC CARDIOMYOPATHY: ICD-10-CM

## 2024-07-05 DIAGNOSIS — I50.42 CHRONIC COMBINED SYSTOLIC AND DIASTOLIC HEART FAILURE (H): Primary | ICD-10-CM

## 2024-07-05 DIAGNOSIS — Z95.810 ICD (IMPLANTABLE CARDIOVERTER-DEFIBRILLATOR) IN PLACE: ICD-10-CM

## 2024-07-05 PROCEDURE — 99215 OFFICE O/P EST HI 40 MIN: CPT | Mod: 24 | Performed by: INTERNAL MEDICINE

## 2024-07-05 RX ORDER — METOPROLOL SUCCINATE 25 MG/1
12.5 TABLET, EXTENDED RELEASE ORAL DAILY
Qty: 45 TABLET | Refills: 1 | Status: ON HOLD | OUTPATIENT
Start: 2024-07-05 | End: 2024-10-05

## 2024-07-05 RX ORDER — BUMETANIDE 1 MG/1
1 TABLET ORAL DAILY PRN
Qty: 30 TABLET | Refills: 3 | Status: SHIPPED | OUTPATIENT
Start: 2024-07-05 | End: 2024-08-27

## 2024-07-05 ASSESSMENT — PAIN SCALES - GENERAL: PAINLEVEL: NO PAIN (0)

## 2024-07-05 NOTE — LETTER
2024      RE: Duane C Johnson  [id number] [id number]       Dear Colleague,    Thank you for the opportunity to participate in the care of your patient, Duane C Johnson, at the [facility] [place] at [facility]. Please see a copy of my visit note below.    [facility]  Advanced Heart Failure Clinic    Patient Name: Duane C Johnson   : 1950   Date of Visit: 2024    Primary Care Physician: Jose Coles MD     Referring Physician: NADEGE Grier, CNP  Reason for Visit: ischemic cardiomyopathy    Dear Sirisha LIGHT, and Dr. Coles,     I had the pleasure of seeing Duane C Johnson today in the [facility] Advanced Heart Failure Clinic. As you know Duane C Johnson is a pleasant 74 year old year old male, who presents today for further evaluation of ischemic cardiomyopathy.    Mr. Aguila is a 73 year old male with medical history pertinent for anterior STEMI s/p PCI to the pLAD on 10/26/23 with a VT/VF arrest the next day (10/27) with patent stents and unchanged anatomy on repeat angiogram. Placed on amiodarone and discharged 23, ICD was not indicated as this was within 48h of his MI. His EF prior do discharge was 20-30% with a large area of akinesis in the LAD placed on apixaban due to this. He has since been seen in critical care clinic and CORE clinic and established HF care with me in 2024.    Mr. Aguila was readmitted -23 with ADHF and treated with IV lasix. He had some lightheadedness and his metoprolol dose was adjusted. Brillinta changed to the plavix due to SOB. Seen in the ED on 23 for hypotension with BPs 80s/60s. Holding lasix, metoprolol, and losartan. Of note, he was asymptomatic with hypotension. He was seen in the ED on  for chest pain with unremarkable work-up,  admitted - for shortness of breath with bilateral pleural effusions s/p  thoracentesis and ADHF exacerbation. He returned to the ED on 12/30 with SOB and was IV diuresed with bumex and his outpatient regimen was switched to PO bumex. He was instructed to hold losartan and metoprolol for hypotension.      Since December, no ER visits or hospitalization for HF. He underwent ICD implantation in May 2024. He completed cardiac rehab.    I last saw him as a virtual visit in June 2024 when I increased the dose of his lisinopril from 1.25 mg daily to 2.5 mg daily.  He presents today for follow-up as a virtual visit.    Today he reports feeling well.  He is tolerating this change well. Once on 7/1/24 his SBP was 76 mm Hg and he did not take the metoprolol that day. Otherwise he has taken all doses. He takes lisinopril 2.5 mg in the morning, and the metoprolol in the PM.   He reports no worsening symptoms and feels things are stable. He feels more lethargic the last couple of days, having to take a nap in the afternoon, and slept in the morning as well, which he says is unusual. His BP has not changed over these few days.     He has had diarrhea for a few weeks, but this is improving.     They have not had to hold medications for low BP -  since March 2024 except for once on 7/1/24.     He denies chest pain, shortness of breath, PND/orthopnea, palpitations, syncope, leg swelling.      Wife closely monitors sodium and fluid intake. They have been adhering to a 1.5 L FR and 1500 mg sodium restriction.      Home BPs - 90s / 50s-60s (once was in the 70s)  Home heart rates - 60-61  Weight 132-133 lbs      ROS:  A complete 10-point ROS was negative except as above.    PAST MEDICAL HISTORY:  Past Medical History:   Diagnosis Date    Benign essential hypertension     CAD (coronary artery disease)     Chronic systolic heart failure (H)     Hypertension     ST elevation myocardial infarction (STEMI), unspecified artery (H)     Ventricular fibrillation (H)        PAST SURGICAL HISTORY:  Past Surgical  History:   Procedure Laterality Date    COLONOSCOPY N/A 3/23/2023    Procedure: COLONOSCOPY, FLEXIBLE, WITH LESION REMOVAL USING SNARE;  Surgeon: Jose Faustin MD;  Location: [facility]    CV CENTRAL VENOUS CATHETER PLACEMENT N/A 10/26/2023    Procedure: Central Venous Catheter Placement;  Surgeon: Rob Lyles MD;  Location:  HEART CARDIAC CATH LAB    CV CORONARY ANGIOGRAM N/A 10/27/2023    Procedure: Coronary Angiogram;  Surgeon: Rob Lyles MD;  Location:  HEART CARDIAC CATH LAB    CV CORONARY ANGIOGRAM N/A 10/26/2023    Procedure: Coronary Angiogram;  Surgeon: Rob Lyles MD;  Location:  HEART CARDIAC CATH LAB    CV LEFT HEART CATH N/A 10/26/2023    Procedure: Left Heart Catheterization;  Surgeon: Rob Lyles MD;  Location:  HEART CARDIAC CATH LAB    CV PCI N/A 10/27/2023    Procedure: Percutaneous Coronary Intervention;  Surgeon: Rob Lyles MD;  Location:  HEART CARDIAC CATH LAB    CV PCI STENT DRUG ELUTING N/A 10/26/2023    Procedure: Percutaneous Coronary Intervention Stent;  Surgeon: Rob Lyles MD;  Location:  HEART CARDIAC CATH LAB    CV RIGHT HEART CATH MEASUREMENTS RECORDED N/A 5/6/2024    Procedure: Heart Cath Right Heart Cath;  Surgeon: Rob Lyles MD;  Location:  HEART CARDIAC CATH LAB    EP ICD INSERT SINGLE N/A 5/8/2024    Procedure: Implantable Cardioverter Defibrillator Device & Lead Implant Dual;  Surgeon: Guero Black MD;  Location:  HEART CARDIAC CATH LAB    INJECTION, HYDROGEL SPACER N/A 4/22/2024    Procedure: INJECTION, HYDROGEL SPACER AND FIDUCIAL MARKER PLACEMENT;  Surgeon: Stanislaw Barros MD;  Location: [facility]       FAMILY HISTORY:  Family History   Problem Relation Age of Onset    Other Cancer Mother     Obesity Mother     GI problems Father     Uterine Cancer Sister        SOCIAL HISTORY:  Social History     Socioeconomic History    Marital status:      Spouse name: None     Number of children: None    Years of education: None    Highest education level: None   Tobacco Use    Smoking status: Former     Packs/day: 0.25     Years: 30.00     Additional pack years: 0.00     Total pack years: 7.50     Types: Cigarettes     Quit date: 10/26/2023     Years since quittin.2     Passive exposure: Current    Smokeless tobacco: Never    Tobacco comments:     Quit 10/26/2023   Vaping Use    Vaping Use: Never used   Substance and Sexual Activity    Alcohol use: Yes     Comment: 1-2 beers per day    Drug use: No    Sexual activity: Yes     Partners: Female     Birth control/protection: None   Other Topics Concern    Parent/sibling w/ CABG, MI or angioplasty before 65F 55M? No     Social Determinants of Health     Financial Resource Strain: Low Risk  (2024)    Financial Resource Strain     Within the past 12 months, have you or your family members you live with been unable to get utilities (heat, electricity) when it was really needed?: No   Food Insecurity: Low Risk  (2024)    Food Insecurity     Within the past 12 months, did you worry that your food would run out before you got money to buy more?: No     Within the past 12 months, did the food you bought just not last and you didn t have money to get more?: No   Transportation Needs: Low Risk  (2024)    Transportation Needs     Within the past 12 months, has lack of transportation kept you from medical appointments, getting your medicines, non-medical meetings or appointments, work, or from getting things that you need?: No   Physical Activity: Inactive (2023)    Exercise Vital Sign     Days of Exercise per Week: 0 days     Minutes of Exercise per Session: 0 min   Social Connections: Unknown (2023)    Social Connection and Isolation Panel [NHANES]     Marital Status:    Interpersonal Safety: Low Risk  (10/9/2023)    Interpersonal Safety     Do you feel physically and emotionally safe where you currently live?:  Yes     Within the past 12 months, have you been hit, slapped, kicked or otherwise physically hurt by someone?: No     Within the past 12 months, have you been humiliated or emotionally abused in other ways by your partner or ex-partner?: No   Housing Stability: Low Risk  (1/2/2024)    Housing Stability     Do you have housing? : Yes     Are you worried about losing your housing?: No       MEDICATIONS:  Current Outpatient Medications   Medication Sig Dispense Refill    amiodarone (PACERONE) 200 MG tablet Take 1 tablet (200 mg) by mouth daily 90 tablet 3    apixaban ANTICOAGULANT (ELIQUIS) 5 MG tablet Take 1 tablet (5 mg) by mouth 2 times daily 180 tablet 3    atorvastatin (LIPITOR) 80 MG tablet Take 1 tablet (80 mg) by mouth daily 90 tablet 3    bumetanide (BUMEX) 1 MG tablet TAKE 1 TABLET BY MOUTH ONCE A DAY 30 tablet 3    cetirizine (ZYRTEC) 10 MG tablet Take 10 mg by mouth daily      clopidogrel (PLAVIX) 75 MG tablet Take 1 tablet (75 mg) by mouth daily 30 tablet 3    escitalopram (LEXAPRO) 10 MG tablet Take 10 mg by mouth daily      fish oil-omega-3 fatty acids 1000 MG capsule Take 1 g by mouth 2 times daily Also contains another supplement      lisinopril (ZESTRIL) 2.5 MG tablet Take 1 tablet (2.5 mg) by mouth daily HOLD for systolic blood pressure < 90 90 tablet 1    magnesium oxide (MAG-OX) 400 MG tablet Take 1 tablet (400 mg) by mouth daily 90 tablet 3    melatonin 5 MG tablet Take 1-2 tablets (5-10 mg) by mouth at bedtime 60 tablet 0    metoprolol succinate ER (TOPROL XL) 25 MG 24 hr tablet Take 0.5 tablets (12.5 mg) by mouth daily Hold for systolic blood pressure less than 90. 45 tablet 1    multivitamin w/minerals (THERA-VIT-M) tablet Take 1 tablet by mouth 2 times daily      potassium chloride ER (KLOR-CON M) 20 MEQ CR tablet Take 1 tablet (20 mEq) by mouth daily 30 tablet 5    vitamin C (ASCORBIC ACID) 1000 MG TABS Take 1,000 mg by mouth daily       No current facility-administered medications for this  visit.        ALLERGIES:     Allergies   Allergen Reactions    Brilinta [Ticagrelor] Other (See Comments) and Difficulty breathing     Per pt and spouse, hyperventilation.    Clonazepam Other (See Comments)     Per spouse, acted like a zombie and he was shaky, could barely talk.       PHYSICAL EXAM:  Wt 59.9 kg (132 lb 1.6 oz)   BMI 21.32 kg/m    Exam - limited by virtual visit      LABS:    CMP  Last Comprehensive Metabolic Panel:  Sodium   Date Value Ref Range Status   06/29/2024 136 135 - 145 mmol/L Final   05/26/2021 141 133 - 144 mmol/L Final     Sodium Whole Blood   Date Value Ref Range Status   10/27/2023 138 135 - 145 mmol/L Final     Potassium   Date Value Ref Range Status   06/29/2024 3.8 3.4 - 5.3 mmol/L Final   05/26/2021 4.5 3.4 - 5.3 mmol/L Final     Potassium Whole Blood   Date Value Ref Range Status   10/27/2023 4.1 3.4 - 5.3 mmol/L Final     Chloride   Date Value Ref Range Status   06/29/2024 100 98 - 107 mmol/L Final   05/26/2021 105 94 - 109 mmol/L Final     Chloride Whole Blood   Date Value Ref Range Status   10/27/2023 103 94 - 109 mmol/L Final     Carbon Dioxide   Date Value Ref Range Status   05/26/2021 29 20 - 32 mmol/L Final     Carbon Dioxide (CO2)   Date Value Ref Range Status   06/29/2024 27 22 - 29 mmol/L Final     Carbon Dioxide Whole Blood   Date Value Ref Range Status   10/27/2023 27 20 - 32 mmol/L Final     Anion Gap   Date Value Ref Range Status   06/29/2024 9 7 - 15 mmol/L Final   05/26/2021 7 3 - 14 mmol/L Final     Glucose   Date Value Ref Range Status   06/29/2024 96 70 - 99 mg/dL Final   05/26/2021 125 (H) 70 - 99 mg/dL Final     GLUCOSE BY METER POCT   Date Value Ref Range Status   11/01/2023 121 (H) 70 - 99 mg/dL Final     Urea Nitrogen   Date Value Ref Range Status   06/29/2024 28.5 (H) 8.0 - 23.0 mg/dL Final   05/26/2021 41 (H) 7 - 30 mg/dL Final     Creatinine   Date Value Ref Range Status   06/29/2024 0.98 0.67 - 1.17 mg/dL Final   05/26/2021 0.80 0.66 - 1.25 mg/dL Final      GFR Estimate   Date Value Ref Range Status   06/29/2024 81 >60 mL/min/1.73m2 Final     Comment:     eGFR calculated using 2021 CKD-EPI equation.   05/26/2021 90 >60 mL/min/[1.73_m2] Final     Comment:     Non  GFR Calc  Starting 12/18/2018, serum creatinine based estimated GFR (eGFR) will be   calculated using the Chronic Kidney Disease Epidemiology Collaboration   (CKD-EPI) equation.       GFR, ESTIMATED POCT   Date Value Ref Range Status   10/26/2023 >60 >60 mL/min/1.73m2 Final     Calcium   Date Value Ref Range Status   06/29/2024 9.4 8.8 - 10.2 mg/dL Final   05/26/2021 8.3 (L) 8.5 - 10.1 mg/dL Final     Bilirubin Total   Date Value Ref Range Status   02/07/2024 0.6 <=1.2 mg/dL Final   03/20/2018 0.5 0.2 - 1.3 mg/dL Final     Alkaline Phosphatase   Date Value Ref Range Status   02/07/2024 95 40 - 150 U/L Final     Comment:     Reference intervals for this test were updated on 11/14/2023 to more accurately reflect our healthy population. There may be differences in the flagging of prior results with similar values performed with this method. Interpretation of those prior results can be made in the context of the updated reference intervals.   03/20/2018 82 40 - 150 U/L Final     ALT   Date Value Ref Range Status   02/07/2024 29 0 - 70 U/L Final     Comment:     Reference intervals for this test were updated on 6/12/2023 to more accurately reflect our healthy population. There may be differences in the flagging of prior results with similar values performed with this method. Interpretation of those prior results can be made in the context of the updated reference intervals.     03/20/2018 22 0 - 70 U/L Final     AST   Date Value Ref Range Status   02/07/2024 35 0 - 45 U/L Final     Comment:     Reference intervals for this test were updated on 6/12/2023 to more accurately reflect our healthy population. There may be differences in the flagging of prior results with similar values performed with  "this method. Interpretation of those prior results can be made in the context of the updated reference intervals.   2018 20 0 - 45 U/L Final       NT-proBNP       TROP  No results found for: \"TROPI\", \"TROPONIN\", \"TROPR\", \"TROPN\"    CBC  CBC RESULTS:   Recent Labs   Lab Test 24  1122   WBC 5.3   RBC 4.05*   HGB 12.4*   HCT 37.1*   MCV 92   MCH 30.6   MCHC 33.4   RDW 14.4          LIPIDS  Recent Labs   Lab Test 10/26/23  0336 10/26/23  0059   CHOL 96 117   HDL 30* 32*   LDL 60 67   TRIG 29 89       TSH  TSH   Date Value Ref Range Status   2023 5.24 (H) 0.30 - 4.20 uIU/mL Final       HBA1C  Lab Results   Component Value Date    A1C 4.8 2023         CARDIAC DATA:    EK2024: Heart rate 56 bpm, sinus bradycardia, anteroseptal infarct age-indeterminate, prolonged QT    Echo:  10/30/2023  Interpretation Summary  Ischemic CM. Large LAD MI. Left ventricular function is decreased. The ejection fraction is 20-30% (severely reduced).  Global right ventricular function is normal.  No significant valvular abnormalities present.  IVC diameter <2.1 cm collapsing >50% with sniff suggests a normal RA pressure  of 3 mmHg.  No pericardial effusion is present.  No significant changes noted.    2024  1. The left ventricle is moderately dilated. Left ventricular hypertrophy is noted by two-dimensional echocardiography. Proximal septal thickening is noted. Left ventricular systolic function is moderate to severely reduced. The visual ejection fraction is 25-30%. Biplane LVEF is 25%. Diastolic Doppler findings (E/E' ratio and/or other parameters) suggest left ventricular filling pressures are increased. There is severe hypokinesis to akinesis of the mid anterior/anterolateral/anteroseptal/inferoseptal walls and all apical segments of the left ventricle. There is no thrombus seen in the left ventricle.  2. The right ventricle is normal size. The right ventricular systolic function is normal.  3. The " left atrium is moderately dilated. Right atrial size is normal. There is no color Doppler evidence of an atrial shunt.  4. There is moderate to mod-severe (2-3+) mitral regurgitation.  5. There is mild to moderate (1-2+) tricuspid regurgitation.Right ventricular systolic pressure is elevated, consistent with moderate pulmonary hypertension.  6. No pericardial effusion.  7. In direct comparison to the previous study dated 10/30/2023, there has been an interval increase in the degree of mitral regurgitation. The other findings are similar.    Stress Test:  None recent    Cardiac MRI:  12/6/2023 CMR  Clinical history: 73 year old with anterior STEMI, cardiac arrest in Oct 2023  Comparison CMR: none  1. The left ventricle is severely enlarged. There is wall thinning and akinesis of the mid-distal anterior,  mid anteroseptum, distal septum and the apex  The global systolic function is severely reduced. The LVEF is 28%.  2. The right ventricle is normal in cavity size. The global systolic function is normal. The RVEF is 52%.   3. The right atrium is normal and the left atrium is severely enlarged.  4. There is mild mitral regurgitation.   5. There is transmural late gadolinium enhancement in mid-distal anterior, mid anteroseptum, distal  septum and the apex consistent with a large infarction in the LAD territory.   6. There is no pericardial effusion.  7. There is no intracardiac thrombus.  8. There are bilateral pleural effusions.  CONCLUSIONS:   Ischemic cardiomyopathy with a large anteroapical infarction.   Severely reduced left ventricular function and normal right ventricular function, LVEF 28% and RVEF 52%.    Cardiac Catheterization:  10/26/23    1st Mrg lesion is 20% stenosed.    Prox LAD to Mid LAD lesion is 100% stenosed.    1st Diag-1 lesion is 80% stenosed.    1st Diag-2 lesion is 50% stenosed.    Dist LAD lesion is 100% stenosed.    RPDA lesion is 70% stenosed.    Dist RCA lesion is 40% stenosed.     Anterior  STEMI  Two vessel obstructive CAD (100% pLAD occlusion, 70% rPDA stenosis, 80% diagonal 1 stenosis)  PCI of pLAD to mLAD with BRENDA x 1 (Synergy 2.50x 28 mm) post dilated to 2.75 vessel  CVC placement in RIJ  LVEDP of 26 mmHg  Hemostasis of RRA with TR band     10/26/23:   Unchanged angiogram with patient stent and good distal flow in all arteries  Most likely evolving injury related VT   Amiodarone loaded 75 mg and continue on the floor loading and consult EP for possible vest and or EP study    RHC 5/6/24  RA: 10/9/6 mmHg  RV: 61/11 mmHg  PA: 63/24/38 mmHg  PCWP: Unable to obtain accurate measurement  CO/CI (Goldy): 3.89/2.3 L/min/m2    PA sat: 64%  MAP: 85 mmHg      Right sided filling pressures are normal.    Mild elevated pulmonary hypertension.    Normal cardiac output level.    CPTX May 2024    ASSESSMENT/PLAN:  In summary, Duane C Johnson is here today with the following -      Chronic combined systolic diastolic heart failure, ACC/AHA stage C, NYHA class III, EF 25%  2.  Ischemic cardiomyopathy  3.  Coronary artery disease, history of anterior STEMI in October 2023, s/p PCI of LAD with drug-eluting stents  4. History of VT within 48 hours of his MI, did not receive an ICD, now s/p primary prevention ICD  5.  Ongoing issues with hypotension and hospitalizations for heart failure, most recently in December 2023  6.  Prostate cancer - completed radiation therapy    Plans -  Duane has ischemic cardiomyopathy after an anterior STEMI.  His LVEF has been quite low at 25%.  He also has limited blood pressure although is asymptomatic from this.    His right heart cath and cardiopulmonary stress test -  look reasonable and does not need consideration for LVAD now, but can consider repeating 6 months - 1 year to monitor his cardiac status.    Goal Directed Medical Therapies for Heart Failure:     These are indicated irrespective of the etiology of heart failure.  We discussed the primary medications, their side effects,  the care plan including frequent follow-ups  for optimization of therapies with monitoring of blood pressures, weights and lab results     He has previously been on minimal doses of GDMT due to significant hypotension.  With recent slight improvement in blood pressure, can consider slowly uptitrating these as tolerated and not intentionally being very slow with this.    Beta blocker: Continue metoprolol XL 12.5 mg once daily at bedtime, hold for SBP < 90.  Will also monitor his heart rate on this  ACE/ARB/ARNI: on lisinopril 2.5 mg once daily with instructions to hold for SBP <90   MRA: yet to start it with significant hypotension  SGLT2 inhibitor: no h/o UTIs. Yet to start it with significant hypotension --> check costs, if affordable can start empagliflozin 10 mg once daily and at the same time have him stop scheduled Bumex and changed to as needed.     Diuretics: On Bumex 1 mg once daily --> changed to as needed with starting SGLT2 inhibitor  Cardiac Rehab: completed  ICD/ CRT-D: Status post ICD implantation in May 2024  Labs: At follow-up  Follow up: CORE clinic in August and see me in Sept/Oct  CV Genetic testing: Deferred in the setting of ischemic cardiomyopathy  Sleep medicine: evaluation with sleep medicine in March 2024     Advised on daily home BP and weight monitoring  Advised on observing caution with salt intake, encourage fluid intake    # CAD s/p PCI to LAD  # STEMI  # VT/VF arrest  - on plavix and apixaban (akinesis of the mid-distal anterior,mid anteroseptum, distal septum and the apex)  - on atorvastatin 80 mg daily    #History of possible AF during prior hospitalization  - no clear record. Prior EKGs and ziopatches have failed to demonstrated AFib  - he is currently on apixaban given low EF.  However his EF has been reduced since October 2023 and in the absence of documented LV thrombus by both cardiac MRI (December 2023) and echocardiogram (February 2024) - there is no current indication to  continue apixaban and will thus stop apixaban.      I spent 46 minutes in care of the patient today including obtaining recent medical history, personally reviewing recent cardiac testing and/or lab results, today's examination, discussion of testing results and care recommendations with patient.       Thank you for the opportunity to participate in this patient's care. Please feel free to reach out with any questions or concerns.      Ham Cruz MD, [facility]  Associate Professor of Medicine  Advanced Heart Failure, LVAD & Cardiac Transplant  Director, Cardio-Obstetrics  Cardio-Oncology  [facility]      Please do not hesitate to contact me if you have any questions/concerns.     Sincerely,     Ham Cruz MD

## 2024-07-05 NOTE — PATIENT INSTRUCTIONS
Cardiology Providers you saw during your visit:  Dr. Cruz     Medication changes:  1- Stop DAILY Bumex  2-Stop Abixiban   3-Bumex 1 mg as needed for increase in weight, increased shortness of breath or swelling. Please let us  know if you are using it more than twice per week.   4-Start Jardiance 10 mg daily; this will be about $70 for a 3 month supply.   5-Increase Fluid intake to 70 oz per day to help maintain blood pressures.     Please check your BP daily, about 90 minutes after taking your AM meds. If BP are consistently less than 90/50 or you have persistent lightheadedness or dizziness please let us know. Some brief lightheadedness with position changes (going from sitting to standing) is expected and should improve.         Follow up:  1- Follow-up with Dr. Curz in September, can be virtual if pt prefers. Needs to have labs prior.        Please call if you have:  1. Weight gain of more than 2 pounds in a day or 5 pounds in a week  2. Increased shortness of breath, swelling or bloating  3. Dizziness, lightheadedness   4. Any questions or concerns.        Follow the American Heart Association Diet and Lifestyle recommendations:  Limit saturated fat, trans fat, sodium, red meat, sweets and sugar-sweetened beverages. If you choose to eat red meat, compare labels and select the leanest cuts available.  Aim for at least 150 minutes of moderate physical activity or 75 minutes of vigorous physical activity - or an equal combination of both - each week.     During business hours: [phone], press option # 1 to schedule an appointment or send a message to your care team     After hours, weekends or holidays: On Call Cardiologist- [phone]   option #4 and ask to speak to the on-call Cardiologist. Inform them you are a CORE/heart failure patient at the [facility].     Chelsey Castillo RN BSN CHFN  Cardiology Nurse Care Coordinator (Heart Failure / C.O.R.E.)

## 2024-07-05 NOTE — PROGRESS NOTES
"Virtual Visit Details    Type of service:  Video Visit   Video Start Time: {video visit start/end time for provider to select:990281}  Video End Time:{video visit start/end time for provider to select:497567}    Originating Location (pt. Location): {video visit patient location:321831::\"Home\"}  {PROVIDER LOCATION On-site should be selected for visits conducted from your clinic location or adjoining [facility] hospital, academic office, or other nearby [facility] building. Off-site should be selected for all other provider locations, including home:[id number]}  Distant Location (provider location):  {virtual location provider:573790}  Platform used for Video Visit: {Virtual Visit Platforms:872899::\"[email or web address]\"}  "

## 2024-07-05 NOTE — PROGRESS NOTES
[facility]  Advanced Heart Failure Clinic    Patient Name: Duane C Johnson   : 1950   Date of Visit: 2024    Primary Care Physician: Jose Coles MD     Referring Physician: NADEGE Grier, CNP  Reason for Visit: ischemic cardiomyopathy    Virtual Visit Details     Type of service:  Video Visit   Video Start Time: 1:48 PM  Video End Time:2:07 PM    Originating Location (pt. Location): Home    Distant Location (provider location):  On-site  Platform used for Video Visit: AmWell    Dear Sirisha LIGHT, and Dr. Coles,     I had the pleasure of seeing Duane C Johnson today in the [facility] Advanced Heart Failure Clinic. As you know Duane C Johnson is a pleasant 74 year old year old male, who presents today for further evaluation of ischemic cardiomyopathy.    Mr. Aguila is a 73 year old male with medical history pertinent for anterior STEMI s/p PCI to the pLAD on 10/26/23 with a VT/VF arrest the next day (10/27) with patent stents and unchanged anatomy on repeat angiogram. Placed on amiodarone and discharged 23, ICD was not indicated as this was within 48h of his MI. His EF prior do discharge was 20-30% with a large area of akinesis in the LAD placed on apixaban due to this. He has since been seen in critical care clinic and CORE clinic and established HF care with me in 2024.    Mr. Aguila was readmitted -23 with ADHF and treated with IV lasix. He had some lightheadedness and his metoprolol dose was adjusted. Brillinta changed to the plavix due to SOB. Seen in the ED on 23 for hypotension with BPs 80s/60s. Holding lasix, metoprolol, and losartan. Of note, he was asymptomatic with hypotension. He was seen in the ED on  for chest pain with unremarkable work-up,  admitted - for shortness of breath with bilateral pleural effusions s/p thoracentesis and ADHF exacerbation. He returned to the ED on  with SOB and was IV diuresed with  bumex and his outpatient regimen was switched to PO bumex. He was instructed to hold losartan and metoprolol for hypotension.      Since December, no ER visits or hospitalization for HF. He underwent ICD implantation in May 2024. He completed cardiac rehab.    I last saw him as a virtual visit in June 2024 when I increased the dose of his lisinopril from 1.25 mg daily to 2.5 mg daily.  He presents today for follow-up as a virtual visit.    Today he reports feeling well.  He is tolerating this change well. Once on 7/1/24 his SBP was 76 mm Hg and he did not take the metoprolol that day. Otherwise he has taken all doses. He takes lisinopril 2.5 mg in the morning, and the metoprolol in the PM.   He reports no worsening symptoms and feels things are stable. He feels more lethargic the last couple of days, having to take a nap in the afternoon, and slept in the morning as well, which he says is unusual. His BP has not changed over these few days.     He has had diarrhea for a few weeks, but this is improving.     They have not had to hold medications for low BP -  since March 2024 except for once on 7/1/24.     He denies chest pain, shortness of breath, PND/orthopnea, palpitations, syncope, leg swelling.      Wife closely monitors sodium and fluid intake. They have been adhering to a 1.5 L FR and 1500 mg sodium restriction.      Home BPs - 90s / 50s-60s (once was in the 70s)  Home heart rates - 60-61  Weight 132-133 lbs      ROS:  A complete 10-point ROS was negative except as above.    PAST MEDICAL HISTORY:  Past Medical History:   Diagnosis Date    Benign essential hypertension     CAD (coronary artery disease)     Chronic systolic heart failure (H)     Hypertension     ST elevation myocardial infarction (STEMI), unspecified artery (H)     Ventricular fibrillation (H)        PAST SURGICAL HISTORY:  Past Surgical History:   Procedure Laterality Date    COLONOSCOPY N/A 3/23/2023    Procedure: COLONOSCOPY, FLEXIBLE, WITH  LESION REMOVAL USING SNARE;  Surgeon: Jose Faustin MD;  Location: [facility]    CV CENTRAL VENOUS CATHETER PLACEMENT N/A 10/26/2023    Procedure: Central Venous Catheter Placement;  Surgeon: Rob Lyles MD;  Location:  HEART CARDIAC CATH LAB    CV CORONARY ANGIOGRAM N/A 10/27/2023    Procedure: Coronary Angiogram;  Surgeon: Rob Lyles MD;  Location:  HEART CARDIAC CATH LAB    CV CORONARY ANGIOGRAM N/A 10/26/2023    Procedure: Coronary Angiogram;  Surgeon: Rob Lyles MD;  Location:  HEART CARDIAC CATH LAB    CV LEFT HEART CATH N/A 10/26/2023    Procedure: Left Heart Catheterization;  Surgeon: Rob Lyles MD;  Location: [facility] CARDIAC CATH LAB    CV PCI N/A 10/27/2023    Procedure: Percutaneous Coronary Intervention;  Surgeon: Rbo Lyles MD;  Location:  HEART CARDIAC CATH LAB    CV PCI STENT DRUG ELUTING N/A 10/26/2023    Procedure: Percutaneous Coronary Intervention Stent;  Surgeon: Rob Lyles MD;  Location: [facility] CARDIAC CATH LAB    CV RIGHT HEART CATH MEASUREMENTS RECORDED N/A 5/6/2024    Procedure: Heart Cath Right Heart Cath;  Surgeon: Rob Lyles MD;  Location: [facility] CARDIAC CATH LAB    EP ICD INSERT SINGLE N/A 5/8/2024    Procedure: Implantable Cardioverter Defibrillator Device & Lead Implant Dual;  Surgeon: Guero Black MD;  Location: [facility] CARDIAC CATH LAB    INJECTION, HYDROGEL SPACER N/A 4/22/2024    Procedure: INJECTION, HYDROGEL SPACER AND FIDUCIAL MARKER PLACEMENT;  Surgeon: Stanislaw Barros MD;  Location: [facility]       FAMILY HISTORY:  Family History   Problem Relation Age of Onset    Other Cancer Mother     Obesity Mother     GI problems Father     Uterine Cancer Sister        SOCIAL HISTORY:  Social History     Socioeconomic History    Marital status:      Spouse name: None    Number of children: None    Years of education: None    Highest education level: None   Tobacco Use     Smoking status: Former     Packs/day: 0.25     Years: 30.00     Additional pack years: 0.00     Total pack years: 7.50     Types: Cigarettes     Quit date: 10/26/2023     Years since quittin.2     Passive exposure: Current    Smokeless tobacco: Never    Tobacco comments:     Quit 10/26/2023   Vaping Use    Vaping Use: Never used   Substance and Sexual Activity    Alcohol use: Yes     Comment: 1-2 beers per day    Drug use: No    Sexual activity: Yes     Partners: Female     Birth control/protection: None   Other Topics Concern    Parent/sibling w/ CABG, MI or angioplasty before 65F 55M? No     Social Determinants of Health     Financial Resource Strain: Low Risk  (2024)    Financial Resource Strain     Within the past 12 months, have you or your family members you live with been unable to get utilities (heat, electricity) when it was really needed?: No   Food Insecurity: Low Risk  (2024)    Food Insecurity     Within the past 12 months, did you worry that your food would run out before you got money to buy more?: No     Within the past 12 months, did the food you bought just not last and you didn t have money to get more?: No   Transportation Needs: Low Risk  (2024)    Transportation Needs     Within the past 12 months, has lack of transportation kept you from medical appointments, getting your medicines, non-medical meetings or appointments, work, or from getting things that you need?: No   Physical Activity: Inactive (2023)    Exercise Vital Sign     Days of Exercise per Week: 0 days     Minutes of Exercise per Session: 0 min   Social Connections: Unknown (2023)    Social Connection and Isolation Panel [NHANES]     Marital Status:    Interpersonal Safety: Low Risk  (10/9/2023)    Interpersonal Safety     Do you feel physically and emotionally safe where you currently live?: Yes     Within the past 12 months, have you been hit, slapped, kicked or otherwise physically hurt by  someone?: No     Within the past 12 months, have you been humiliated or emotionally abused in other ways by your partner or ex-partner?: No   Housing Stability: Low Risk  (1/2/2024)    Housing Stability     Do you have housing? : Yes     Are you worried about losing your housing?: No       MEDICATIONS:  Current Outpatient Medications   Medication Sig Dispense Refill    amiodarone (PACERONE) 200 MG tablet Take 1 tablet (200 mg) by mouth daily 90 tablet 3    apixaban ANTICOAGULANT (ELIQUIS) 5 MG tablet Take 1 tablet (5 mg) by mouth 2 times daily 180 tablet 3    atorvastatin (LIPITOR) 80 MG tablet Take 1 tablet (80 mg) by mouth daily 90 tablet 3    bumetanide (BUMEX) 1 MG tablet TAKE 1 TABLET BY MOUTH ONCE A DAY 30 tablet 3    cetirizine (ZYRTEC) 10 MG tablet Take 10 mg by mouth daily      clopidogrel (PLAVIX) 75 MG tablet Take 1 tablet (75 mg) by mouth daily 30 tablet 3    escitalopram (LEXAPRO) 10 MG tablet Take 10 mg by mouth daily      fish oil-omega-3 fatty acids 1000 MG capsule Take 1 g by mouth 2 times daily Also contains another supplement      lisinopril (ZESTRIL) 2.5 MG tablet Take 1 tablet (2.5 mg) by mouth daily HOLD for systolic blood pressure < 90 90 tablet 1    magnesium oxide (MAG-OX) 400 MG tablet Take 1 tablet (400 mg) by mouth daily 90 tablet 3    melatonin 5 MG tablet Take 1-2 tablets (5-10 mg) by mouth at bedtime 60 tablet 0    metoprolol succinate ER (TOPROL XL) 25 MG 24 hr tablet Take 0.5 tablets (12.5 mg) by mouth daily Hold for systolic blood pressure less than 90. 45 tablet 1    multivitamin w/minerals (THERA-VIT-M) tablet Take 1 tablet by mouth 2 times daily      potassium chloride ER (KLOR-CON M) 20 MEQ CR tablet Take 1 tablet (20 mEq) by mouth daily 30 tablet 5    vitamin C (ASCORBIC ACID) 1000 MG TABS Take 1,000 mg by mouth daily       No current facility-administered medications for this visit.        ALLERGIES:     Allergies   Allergen Reactions    Brilinta [Ticagrelor] Other (See  Comments) and Difficulty breathing     Per pt and spouse, hyperventilation.    Clonazepam Other (See Comments)     Per spouse, acted like a zombie and he was shaky, could barely talk.       PHYSICAL EXAM:  Wt 59.9 kg (132 lb 1.6 oz)   BMI 21.32 kg/m    Exam - limited by virtual visit      LABS:    CMP  Last Comprehensive Metabolic Panel:  Sodium   Date Value Ref Range Status   06/29/2024 136 135 - 145 mmol/L Final   05/26/2021 141 133 - 144 mmol/L Final     Sodium Whole Blood   Date Value Ref Range Status   10/27/2023 138 135 - 145 mmol/L Final     Potassium   Date Value Ref Range Status   06/29/2024 3.8 3.4 - 5.3 mmol/L Final   05/26/2021 4.5 3.4 - 5.3 mmol/L Final     Potassium Whole Blood   Date Value Ref Range Status   10/27/2023 4.1 3.4 - 5.3 mmol/L Final     Chloride   Date Value Ref Range Status   06/29/2024 100 98 - 107 mmol/L Final   05/26/2021 105 94 - 109 mmol/L Final     Chloride Whole Blood   Date Value Ref Range Status   10/27/2023 103 94 - 109 mmol/L Final     Carbon Dioxide   Date Value Ref Range Status   05/26/2021 29 20 - 32 mmol/L Final     Carbon Dioxide (CO2)   Date Value Ref Range Status   06/29/2024 27 22 - 29 mmol/L Final     Carbon Dioxide Whole Blood   Date Value Ref Range Status   10/27/2023 27 20 - 32 mmol/L Final     Anion Gap   Date Value Ref Range Status   06/29/2024 9 7 - 15 mmol/L Final   05/26/2021 7 3 - 14 mmol/L Final     Glucose   Date Value Ref Range Status   06/29/2024 96 70 - 99 mg/dL Final   05/26/2021 125 (H) 70 - 99 mg/dL Final     GLUCOSE BY METER POCT   Date Value Ref Range Status   11/01/2023 121 (H) 70 - 99 mg/dL Final     Urea Nitrogen   Date Value Ref Range Status   06/29/2024 28.5 (H) 8.0 - 23.0 mg/dL Final   05/26/2021 41 (H) 7 - 30 mg/dL Final     Creatinine   Date Value Ref Range Status   06/29/2024 0.98 0.67 - 1.17 mg/dL Final   05/26/2021 0.80 0.66 - 1.25 mg/dL Final     GFR Estimate   Date Value Ref Range Status   06/29/2024 81 >60 mL/min/1.73m2 Final      Comment:     eGFR calculated using 2021 CKD-EPI equation.   05/26/2021 90 >60 mL/min/[1.73_m2] Final     Comment:     Non  GFR Calc  Starting 12/18/2018, serum creatinine based estimated GFR (eGFR) will be   calculated using the Chronic Kidney Disease Epidemiology Collaboration   (CKD-EPI) equation.       GFR, ESTIMATED POCT   Date Value Ref Range Status   10/26/2023 >60 >60 mL/min/1.73m2 Final     Calcium   Date Value Ref Range Status   06/29/2024 9.4 8.8 - 10.2 mg/dL Final   05/26/2021 8.3 (L) 8.5 - 10.1 mg/dL Final     Bilirubin Total   Date Value Ref Range Status   02/07/2024 0.6 <=1.2 mg/dL Final   03/20/2018 0.5 0.2 - 1.3 mg/dL Final     Alkaline Phosphatase   Date Value Ref Range Status   02/07/2024 95 40 - 150 U/L Final     Comment:     Reference intervals for this test were updated on 11/14/2023 to more accurately reflect our healthy population. There may be differences in the flagging of prior results with similar values performed with this method. Interpretation of those prior results can be made in the context of the updated reference intervals.   03/20/2018 82 40 - 150 U/L Final     ALT   Date Value Ref Range Status   02/07/2024 29 0 - 70 U/L Final     Comment:     Reference intervals for this test were updated on 6/12/2023 to more accurately reflect our healthy population. There may be differences in the flagging of prior results with similar values performed with this method. Interpretation of those prior results can be made in the context of the updated reference intervals.     03/20/2018 22 0 - 70 U/L Final     AST   Date Value Ref Range Status   02/07/2024 35 0 - 45 U/L Final     Comment:     Reference intervals for this test were updated on 6/12/2023 to more accurately reflect our healthy population. There may be differences in the flagging of prior results with similar values performed with this method. Interpretation of those prior results can be made in the context of the  "updated reference intervals.   2018 20 0 - 45 U/L Final       NT-proBNP       TROP  No results found for: \"TROPI\", \"TROPONIN\", \"TROPR\", \"TROPN\"    CBC  CBC RESULTS:   Recent Labs   Lab Test 24  1122   WBC 5.3   RBC 4.05*   HGB 12.4*   HCT 37.1*   MCV 92   MCH 30.6   MCHC 33.4   RDW 14.4          LIPIDS  Recent Labs   Lab Test 10/26/23  0336 10/26/23  0059   CHOL 96 117   HDL 30* 32*   LDL 60 67   TRIG 29 89       TSH  TSH   Date Value Ref Range Status   2023 5.24 (H) 0.30 - 4.20 uIU/mL Final       HBA1C  Lab Results   Component Value Date    A1C 4.8 2023         CARDIAC DATA:    EK2024: Heart rate 56 bpm, sinus bradycardia, anteroseptal infarct age-indeterminate, prolonged QT    Echo:  10/30/2023  Interpretation Summary  Ischemic CM. Large LAD MI. Left ventricular function is decreased. The ejection fraction is 20-30% (severely reduced).  Global right ventricular function is normal.  No significant valvular abnormalities present.  IVC diameter <2.1 cm collapsing >50% with sniff suggests a normal RA pressure  of 3 mmHg.  No pericardial effusion is present.  No significant changes noted.    2024  1. The left ventricle is moderately dilated. Left ventricular hypertrophy is noted by two-dimensional echocardiography. Proximal septal thickening is noted. Left ventricular systolic function is moderate to severely reduced. The visual ejection fraction is 25-30%. Biplane LVEF is 25%. Diastolic Doppler findings (E/E' ratio and/or other parameters) suggest left ventricular filling pressures are increased. There is severe hypokinesis to akinesis of the mid anterior/anterolateral/anteroseptal/inferoseptal walls and all apical segments of the left ventricle. There is no thrombus seen in the left ventricle.  2. The right ventricle is normal size. The right ventricular systolic function is normal.  3. The left atrium is moderately dilated. Right atrial size is normal. There is no color " Doppler evidence of an atrial shunt.  4. There is moderate to mod-severe (2-3+) mitral regurgitation.  5. There is mild to moderate (1-2+) tricuspid regurgitation.Right ventricular systolic pressure is elevated, consistent with moderate pulmonary hypertension.  6. No pericardial effusion.  7. In direct comparison to the previous study dated 10/30/2023, there has been an interval increase in the degree of mitral regurgitation. The other findings are similar.    Stress Test:  None recent    Cardiac MRI:  12/6/2023 CMR  Clinical history: 73 year old with anterior STEMI, cardiac arrest in Oct 2023  Comparison CMR: none  1. The left ventricle is severely enlarged. There is wall thinning and akinesis of the mid-distal anterior,  mid anteroseptum, distal septum and the apex  The global systolic function is severely reduced. The LVEF is 28%.  2. The right ventricle is normal in cavity size. The global systolic function is normal. The RVEF is 52%.   3. The right atrium is normal and the left atrium is severely enlarged.  4. There is mild mitral regurgitation.   5. There is transmural late gadolinium enhancement in mid-distal anterior, mid anteroseptum, distal  septum and the apex consistent with a large infarction in the LAD territory.   6. There is no pericardial effusion.  7. There is no intracardiac thrombus.  8. There are bilateral pleural effusions.  CONCLUSIONS:   Ischemic cardiomyopathy with a large anteroapical infarction.   Severely reduced left ventricular function and normal right ventricular function, LVEF 28% and RVEF 52%.    Cardiac Catheterization:  10/26/23    1st Mrg lesion is 20% stenosed.    Prox LAD to Mid LAD lesion is 100% stenosed.    1st Diag-1 lesion is 80% stenosed.    1st Diag-2 lesion is 50% stenosed.    Dist LAD lesion is 100% stenosed.    RPDA lesion is 70% stenosed.    Dist RCA lesion is 40% stenosed.     Anterior STEMI  Two vessel obstructive CAD (100% pLAD occlusion, 70% rPDA stenosis, 80%  diagonal 1 stenosis)  PCI of pLAD to mLAD with BRENDA x 1 (Synergy 2.50x 28 mm) post dilated to 2.75 vessel  CVC placement in RIJ  LVEDP of 26 mmHg  Hemostasis of RRA with TR band     10/26/23:   Unchanged angiogram with patient stent and good distal flow in all arteries  Most likely evolving injury related VT   Amiodarone loaded 75 mg and continue on the floor loading and consult EP for possible vest and or EP study    RHC 5/6/24  RA: 10/9/6 mmHg  RV: 61/11 mmHg  PA: 63/24/38 mmHg  PCWP: Unable to obtain accurate measurement  CO/CI (Goldy): 3.89/2.3 L/min/m2    PA sat: 64%  MAP: 85 mmHg      Right sided filling pressures are normal.    Mild elevated pulmonary hypertension.    Normal cardiac output level.    CPTX May 2024    ASSESSMENT/PLAN:  In summary, Duane C Johnson is here today with the following -      Chronic combined systolic diastolic heart failure, ACC/AHA stage C, NYHA class III, EF 25%  2.  Ischemic cardiomyopathy  3.  Coronary artery disease, history of anterior STEMI in October 2023, s/p PCI of LAD with drug-eluting stents  4. History of VT within 48 hours of his MI, did not receive an ICD, now s/p primary prevention ICD  5.  Ongoing issues with hypotension and hospitalizations for heart failure, most recently in December 2023  6.  Prostate cancer - completed radiation therapy    Plans -  Duane has ischemic cardiomyopathy after an anterior STEMI.  His LVEF has been quite low at 25%.  He also has limited blood pressure although is asymptomatic from this.    His right heart cath and cardiopulmonary stress test -  look reasonable and does not need consideration for LVAD now, but can consider repeating 6 months - 1 year to monitor his cardiac status.    Goal Directed Medical Therapies for Heart Failure:     These are indicated irrespective of the etiology of heart failure.  We discussed the primary medications, their side effects, the care plan including frequent follow-ups  for optimization of therapies with  monitoring of blood pressures, weights and lab results     He has previously been on minimal doses of GDMT due to significant hypotension.  With recent slight improvement in blood pressure, can consider slowly uptitrating these as tolerated and not intentionally being very slow with this.    Beta blocker: Continue metoprolol XL 12.5 mg once daily at bedtime, hold for SBP < 90.  Will also monitor his heart rate on this  ACE/ARB/ARNI: on lisinopril 2.5 mg once daily with instructions to hold for SBP <90   MRA: yet to start it with significant hypotension  SGLT2 inhibitor: no h/o UTIs. Yet to start it with significant hypotension --> check costs, if affordable can start empagliflozin 10 mg once daily and at the same time have him stop scheduled Bumex and changed to as needed.     Diuretics: On Bumex 1 mg once daily --> changed to as needed with starting SGLT2 inhibitor  Cardiac Rehab: completed  ICD/ CRT-D: Status post ICD implantation in May 2024  Labs: At follow-up  Follow up: CORE clinic in August and see me in Sept/Oct  CV Genetic testing: Deferred in the setting of ischemic cardiomyopathy  Sleep medicine: evaluation with sleep medicine in March 2024     Advised on daily home BP and weight monitoring  Advised on observing caution with salt intake, encourage fluid intake    # CAD s/p PCI to LAD  # STEMI  # VT/VF arrest  - on plavix and apixaban (akinesis of the mid-distal anterior,mid anteroseptum, distal septum and the apex)  - on atorvastatin 80 mg daily    #History of possible AF during prior hospitalization  - no clear record. Prior EKGs and ziopatches have failed to demonstrated AFib  - he is currently on apixaban given low EF.  However his EF has been reduced since October 2023 and in the absence of documented LV thrombus by both cardiac MRI (December 2023) and echocardiogram (February 2024) - there is no current indication to continue apixaban and will thus stop apixaban.      I spent 46 minutes in care of  the patient today including obtaining recent medical history, personally reviewing recent cardiac testing and/or lab results, today's examination, discussion of testing results and care recommendations with patient.       Thank you for the opportunity to participate in this patient's care. Please feel free to reach out with any questions or concerns.      Ham Cruz MD, [facility]  Associate Professor of Medicine  Advanced Heart Failure, LVAD & Cardiac Transplant  Director, Cardio-Obstetrics  Cardio-Oncology  [facility]

## 2024-07-05 NOTE — NURSING NOTE
New Medication:   1- Stop DAILY Bumex  2-Stop Abixiban   3-Bumex 1 mg as needed for increase in weight, increased shortness of breath or swelling. Please let us  know if you are using it more than twice per week.   4-Start Jardiance 10 mg daily; this will be about $70 for a 3 month supply.   5-Increase Fluid intake to 70 oz per day to help maintain blood pressures.   Patient was educated regarding newly prescribed medication, including discussion of  the indication, administration, side effects, and when to report to MD or RN. Patient demonstrated understanding of this information and agreed to call with further questions or concerns.    Return Appointment:   1- Follow-up with Dr. Cruz in September, can be virtual if pt prefers. Needs to have labs prior.   Patient given instructions regarding scheduling next clinic visit. Patient demonstrated understanding of this information and agreed to call with further questions or concerns.    Patient stated he understood all health information given and agreed to call with further questions or concerns.     Eduarda Jeronimo RN

## 2024-07-05 NOTE — NURSING NOTE
Current patient location: [address] [place] [address]    Is the patient currently in the state of [place]? YES    Visit mode:VIDEO    If the visit is dropped, the patient can be reconnected by: VIDEO VISIT: Text to cell phone:   Telephone Information:   Mobile [phone]       Will anyone else be joining the visit? Yes  (If patient encounters technical issues they should call [phone]    How would you like to obtain your AVS? MyChart    Are changes needed to the allergy or medication list? No    Are refills needed on medications prescribed by this physician? YES    Reason for visit: SANTANA JOHNSTON

## 2024-07-08 ENCOUNTER — TELEPHONE (OUTPATIENT)
Dept: CARDIOLOGY | Facility: CLINIC | Age: 74
End: 2024-07-08
Payer: MEDICARE

## 2024-07-08 NOTE — TELEPHONE ENCOUNTER
Patient confirmed scheduled appointment:  Date: 09/16/24  Time: 215pm  Visit type: rt heart failure   Provider: david  Location: [facility]  Testing/imaging: labs   Additional notes: n/a

## 2024-07-09 ENCOUNTER — DOCUMENTATION ONLY (OUTPATIENT)
Dept: RADIATION THERAPY | Facility: OUTPATIENT CENTER | Age: 74
End: 2024-07-09

## 2024-07-09 NOTE — PROGRESS NOTES
Radiotherapy Treatment Summary              PATIENT: Duane C Johnson  MEDICAL RECORD NO: [id number]   : 1950    DIAGNOSIS: Localized prostate adenocarcinoma, [place] 4+3, PSA 4.61, unfavorable intermediate risk, grade group 3  INTENT OF RADIOTHERAPY: Definitive  PATHOLOGY:    Prostate, target 2, biopsy:   - Prostatic adenocarcinoma  - [place] score 7 (4+3)  - Grade group 3  - Extent: Involves 1 of 2 cores (3 mm, 20%)                                 CONCURRENT SYSTEMIC THERAPY:    No     ONCOLOGIC HISTORY:          Duane C Johnson is a 74 year old gentleman with significant cardiovascular disease diagnosed with [place] 4+3, PSA 4.61 unfavorable intermediate risk prostate cancer referred for definitive management with radiation therapy.     HISTORY OF PRESENT ILLNESS:  Duane C Johnson is a 74 year old gentleman with significant smoking history (.25ppd x 30years, quit 10/26/23) and multiple cardiac comorbidities, recently diagnosed with high volume intermediate risk prostate cancer referred to us by Dr. Ruiz.  He was initially seen by Dr. Ruiz back in  for having an slightly elevated PSA.  Relevant PSA findings of 4.61 on 3/13/23 and  of 3.80 on 24. On 2023 prostate MRI showed 26 gram prostate. PIRADS 4 lesion located at the right mid gland peripheral zone at 7:00 and there is short segment capsular abutment with low likelihood of minimal extraprostatic extension. There are 2 additional PIRADS 4 lesions.  The lesions approach the neurovascular bundles without direct invasion. No suspicious adenopathy or evidence of pelvic metastases.     On 2023, Dr. Ruiz completed a biopsy revealing [place] 4+3 high-volume disease. Per Urology NATHALIA revealed normal prostate 20ml prostate.     He initially elected prostatectomy, however had MI on 10/26/23, the day before scheduled surgery.  His surgery was canceled and he has continued to have CHF with multiple hospitalizations and ED visits  since. He also has had aspiration pneumonia. Currently he is going to cardiac rehab. In light of recent events deemed to be a poor surgical candidate and recommended to have external beam radiation therapy.    Seen by Dr Youngblood  on 3/5/24. Initiated ADT every 3 months on 3/19/24, second dose 6/11/24.    He completed radiation to the  prostate on 6/27/24    SITE OF TREATMENT: Prostate     DATES  OF TREATMENT: 5/14/24 to 6/27/24     TOTAL DOSE OF TREATMENT: 7, 000 cGy    DOSE PER FRACTION OF TREATMENT: 250 cGy x 28 fractions       COMMENT/TOXICITY:   mild GI bother, low fiber diet, Imodium as needed                 PAIN MANAGEMENT:  None                           FOLLOW UP PLAN:One month    CC  Patient Care Team:  Jose Coles MD as PCP - General (Family Practice)  Davey Randolph MD as Assigned PCP  Aranza Jiménez PA-C as Physician Assistant (Urology)  Dai Aguila RN as Specialty Care Coordinator (Cardiology)  Sirisha Arias, APRN CNP as Assigned Surgical Provider  Nanette Martínez MD as MD (Radiation Oncology)  Nanette Martínez MD as MD (Radiation Oncology)  Adama Youngblood MD as MD (Medical Oncology)  Guero Black MD as MD (Cardiovascular Disease)  Ham Cruz MD as Assigned Heart and Vascular Provider  Chelsey Castillo, RN as Specialty Care Coordinator (Cardiology)  Jasper Vallejo MD as Assigned Cancer Care Provider       VIDHI Flores  Department of Radiation Oncology  [facility]

## 2024-07-10 ENCOUNTER — PATIENT OUTREACH (OUTPATIENT)
Dept: CARDIOLOGY | Facility: CLINIC | Age: 74
End: 2024-07-10
Payer: MEDICARE

## 2024-07-10 NOTE — PROGRESS NOTES
Duane was seen in clinic virtually last week and following changes made:      1- Stop DAILY Bumex. Take Bumex 1 mg as needed for increase in weight, increased shortness of breath or swelling. Please let us  know if you are using it more than twice per week.   2-Stop Abixiban   3-Start Jardiance 10 mg daily; this will be about $70 for a 3 month supply-this was affordable for him  5-Increase Fluid intake to 70 oz per day to help maintain blood pressures.     RN to call him sometime next week and see how he is feeling and how BP's are with starting Jardiance.    Called to check in, no voicemail. Will send [employer] message.

## 2024-07-10 NOTE — PROGRESS NOTES
Tallked to duane. He's on just his second day of the jardiance. Confirmed the rest of the med changes. He has been working on drinking more fluids.   Reviewed to call for lower BP or worsening dizziness.   Lately has been consistently around 90-99 systolic. Closer to 100 than 90. 58-61 diastolic.   I told him would give him some more time to adjust to the Jardiance and check in early next week.   He verbalized understanding

## 2024-07-16 NOTE — PROGRESS NOTES
"Called Duane to follow up on blood pressures since starting Jardiance. He is feeling pretty well.   BP - maybe \"a tad lower\" that what he was. Almost no readings below 90 systolic. One reading in the last week at 88. Never above 110. Seldom above 105.   Notices maybe a little dizziness and lightheadedness, maybe noticing it more in the last week. Gave me an example that he used to be able to go out in the dark in his yard, look up at the stars and be stable with his balance. Now, he goes out and tries to look up at the stars and feels a little more unsteady.   Advised to be careful with doing this, and make positional changes slowly.   He has been drinking around 1500 ml. Reminded him that he should try to increase this fluid intake a bit to closer to 70 oz (2070 ml). He will work on this.           "

## 2024-07-29 ENCOUNTER — LAB (OUTPATIENT)
Dept: LAB | Facility: CLINIC | Age: 74
End: 2024-07-29
Payer: MEDICARE

## 2024-07-29 DIAGNOSIS — C61 PROSTATE CANCER (H): ICD-10-CM

## 2024-07-29 LAB — PSA SERPL DL<=0.01 NG/ML-MCNC: <0.01 NG/ML (ref 0–6.5)

## 2024-07-29 PROCEDURE — 36415 COLL VENOUS BLD VENIPUNCTURE: CPT | Performed by: INTERNAL MEDICINE

## 2024-07-29 PROCEDURE — 84153 ASSAY OF PSA TOTAL: CPT | Performed by: INTERNAL MEDICINE

## 2024-07-30 ENCOUNTER — OFFICE VISIT (OUTPATIENT)
Dept: RADIATION THERAPY | Facility: OUTPATIENT CENTER | Age: 74
End: 2024-07-30
Payer: MEDICARE

## 2024-07-30 DIAGNOSIS — C61 PROSTATE CANCER (H): Primary | ICD-10-CM

## 2024-07-30 NOTE — PROGRESS NOTES
Department of Radiation Oncology  Radiation Therapy Center  [facility] [place] [address]  [phone]       Radiation Oncology Follow-up Visit  2024      Duane C Johnson  MRN: [id number]   : 1950     Radiation Oncologist: Jasper Vallejo MD  Provider: VIDHI Flores    DIAGNOSIS: Localized prostate adenocarcinoma, [place] 4+3, PSA 4.61, unfavorable intermediate risk, grade group 3  INTENT OF RADIOTHERAPY: Definitive  PATHOLOGY:    Prostate, target 2, biopsy:   - Prostatic adenocarcinoma  - [place] score 7 (4+3)  - Grade group 3  - Extent: Involves 1 of 2 cores (3 mm, 20%)                                 CONCURRENT SYSTEMIC THERAPY:    No      ONCOLOGIC HISTORY:          Duane C Johnson is a 74 year old gentleman with significant cardiovascular disease diagnosed with [place] 4+3, PSA 4.61 unfavorable intermediate risk prostate cancer referred for definitive management with radiation therapy.     HISTORY OF PRESENT ILLNESS:  Duane C Johnson is a 74 year old gentleman with significant smoking history (.25ppd x 30years, quit 10/26/23) and multiple cardiac comorbidities, recently diagnosed with high volume intermediate risk prostate cancer referred to us by Dr. Ruiz.  He was initially seen by Dr. Ruiz back in  for having an slightly elevated PSA.  Relevant PSA findings of 4.61 on 3/13/23 and  of 3.80 on 24. On 2023 prostate MRI showed 26 gram prostate. PIRADS 4 lesion located at the right mid gland peripheral zone at 7:00 and there is short segment capsular abutment with low likelihood of minimal extraprostatic extension. There are 2 additional PIRADS 4 lesions.  The lesions approach the neurovascular bundles without direct invasion. No suspicious adenopathy or evidence of pelvic metastases.     On 2023, Dr. Ruiz completed a biopsy revealing Sarah 4+3 high-volume disease. Per Urology NATHALIA revealed normal prostate 20ml prostate.     He  "initially elected prostatectomy, however had MI on 10/26/23, the day before scheduled surgery.  His surgery was canceled and he has continued to have CHF with multiple hospitalizations and ED visits since. He also has had aspiration pneumonia. Currently he is going to cardiac rehab. In light of recent events deemed to be a poor surgical candidate and recommended to have external beam radiation therapy.     Seen by Dr Youngblood  on 3/5/24. Initiated ADT every 3 months on 3/19/24, second dose 24. He completed short term ADT. Will see Dr Youngblood prn.      He completed radiation to the  prostate on 24     SITE OF TREATMENT: Prostate      DATES  OF TREATMENT: 24 to 24      TOTAL DOSE OF TREATMENT: 7, 000 cGy     DOSE PER FRACTION OF TREATMENT: 250 cGy x 28 fractions    INTERVAL SINCE COMPLETION OF RADIATION THERAPY:   One month    SUBJECTIVE:   Duane returns for one month follow up after completing radiation therapy. No hot flashes or night sweats. No blood in urine. Has loose stool, but getting better. No blood in stool. Energy is good. Feels \"great\"    PHYSICAL EXAM:  There were no vitals taken for this visit.  Gen: Alert, in NAD  Eyes: PERRL, EOMI, sclera anicteric  HENT     Head: NC/AT     Ears: No external auricular lesions     Nose/sinus: No rhinorrhea or epistaxis     Oral Cavity/Oropharynx: MMM  Neck: Supple, full ROM, no LAD  Pulm: No wheezing, stridor or respiratory distress  CV: Well-perfused, no cyanosis, no pedal edema  Abdominal: Soft, nontender, nondistended, no hepatomegaly  Back: No step-offs or pain to palpation along the thoracolumbar spine, no CVA tenderness  Musculoskeletal: Normal bulk and tone   Skin: Normal color and turgor  Neurologic: A/Ox3, CN II-XII intact  Psychiatric: Appropriate mood and affect    LABS AND IMAGIN24  <0.01  24   Lupron #2  3/19/24  Lupron #1  24  3.80  3/13/23  4.61      IMPRESSION:   Mr. Aguila is a 74 year old male with a localized " prostate adenocarcinoma, Sarah 4+3, PSA 4.61, unfavorable intermediate risk, grade group 3, was scheduled for prostatectomy but had an MI and thus was deemed a poor surgical candidate. Underwent RT and ST ADT. PSA is undetectable. Tolerated radiation well. Here for one month follow up.     PLAN:   6 months with me with PSA    VIDHI Flores  Department of Radiation Oncology  [facility]

## 2024-07-30 NOTE — LETTER
2024      Duane C Johnson  [address] [address] [place] [address]      Dear Colleague,    Thank you for referring your patient, Duane C Johnson, to the [facility] RADIATION THERAPY CLINIC. Please see a copy of my visit note below.       Department of Radiation Oncology  Radiation Therapy Center  [facility]  [place] [address]  [phone]       Radiation Oncology Follow-up Visit  2024      Duane C Johnson  MRN: [id number]   : 1950     Radiation Oncologist: Jasper Vallejo MD  Provider: VIDHI Flores    DIAGNOSIS: Localized prostate adenocarcinoma, [place] 4+3, PSA 4.61, unfavorable intermediate risk, grade group 3  INTENT OF RADIOTHERAPY: Definitive  PATHOLOGY:    Prostate, target 2, biopsy:   - Prostatic adenocarcinoma  - Sarah score 7 (4+3)  - Grade group 3  - Extent: Involves 1 of 2 cores (3 mm, 20%)                                 CONCURRENT SYSTEMIC THERAPY:    No      ONCOLOGIC HISTORY:          Duane C Johnson is a 74 year old gentleman with significant cardiovascular disease diagnosed with Sarah 4+3, PSA 4.61 unfavorable intermediate risk prostate cancer referred for definitive management with radiation therapy.     HISTORY OF PRESENT ILLNESS:  Duane C Johnson is a 74 year old gentleman with significant smoking history (.25ppd x 30years, quit 10/26/23) and multiple cardiac comorbidities, recently diagnosed with high volume intermediate risk prostate cancer referred to us by Dr. Ruiz.  He was initially seen by Dr. Ruiz back in  for having an slightly elevated PSA.  Relevant PSA findings of 4.61 on 3/13/23 and  of 3.80 on 24. On 2023 prostate MRI showed 26 gram prostate. PIRADS 4 lesion located at the right mid gland peripheral zone at 7:00 and there is short segment capsular abutment with low likelihood of minimal extraprostatic extension. There are 2 additional PIRADS 4 lesions.  The lesions approach the neurovascular bundles without  "direct invasion. No suspicious adenopathy or evidence of pelvic metastases.     On September 12, 2023, Dr. Ruiz completed a biopsy revealing [place] 4+3 high-volume disease. Per Urology NATHALIA revealed normal prostate 20ml prostate.     He initially elected prostatectomy, however had MI on 10/26/23, the day before scheduled surgery.  His surgery was canceled and he has continued to have CHF with multiple hospitalizations and ED visits since. He also has had aspiration pneumonia. Currently he is going to cardiac rehab. In light of recent events deemed to be a poor surgical candidate and recommended to have external beam radiation therapy.     Seen by Dr Youngblood  on 3/5/24. Initiated ADT every 3 months on 3/19/24, second dose 6/11/24. He completed short term ADT. Will see Dr Youngblood prn.      He completed radiation to the  prostate on 6/27/24     SITE OF TREATMENT: Prostate      DATES  OF TREATMENT: 5/14/24 to 6/27/24      TOTAL DOSE OF TREATMENT: 7, 000 cGy     DOSE PER FRACTION OF TREATMENT: 250 cGy x 28 fractions    INTERVAL SINCE COMPLETION OF RADIATION THERAPY:   One month    SUBJECTIVE:   Duane returns for one month follow up after completing radiation therapy. No hot flashes or night sweats. No blood in urine. Has loose stool, but getting better. No blood in stool. Energy is good. Feels \"great\"    PHYSICAL EXAM:  There were no vitals taken for this visit.  Gen: Alert, in NAD  Eyes: PERRL, EOMI, sclera anicteric  HENT     Head: NC/AT     Ears: No external auricular lesions     Nose/sinus: No rhinorrhea or epistaxis     Oral Cavity/Oropharynx: MMM  Neck: Supple, full ROM, no LAD  Pulm: No wheezing, stridor or respiratory distress  CV: Well-perfused, no cyanosis, no pedal edema  Abdominal: Soft, nontender, nondistended, no hepatomegaly  Back: No step-offs or pain to palpation along the thoracolumbar spine, no CVA tenderness  Musculoskeletal: Normal bulk and tone   Skin: Normal color and turgor  Neurologic: A/Ox3, CN " II-XII intact  Psychiatric: Appropriate mood and affect    LABS AND IMAGIN24  <0.01  24   Lupron #2  3/19/24  Lupron #1  24  3.80  3/13/23  4.61      IMPRESSION:   Mr. Aguila is a 74 year old male with a localized prostate adenocarcinoma, [place] 4+3, PSA 4.61, unfavorable intermediate risk, grade group 3, was scheduled for prostatectomy but had an MI and thus was deemed a poor surgical candidate. Underwent RT and ST ADT. PSA is undetectable. Tolerated radiation well. Here for one month follow up.     PLAN:   6 months with me with PSA    VIDHI Flores  Department of Radiation Oncology  [facility]          Again, thank you for allowing me to participate in the care of your patient.        Sincerely,        Yara Nicolas PA-C

## 2024-08-06 DIAGNOSIS — I21.02 ST ELEVATION MYOCARDIAL INFARCTION INVOLVING LEFT ANTERIOR DESCENDING (LAD) CORONARY ARTERY (H): ICD-10-CM

## 2024-08-06 DIAGNOSIS — I10 PRIMARY HYPERTENSION: ICD-10-CM

## 2024-08-07 RX ORDER — POTASSIUM CHLORIDE 1500 MG/1
20 TABLET, EXTENDED RELEASE ORAL DAILY
Qty: 30 TABLET | Refills: 2 | Status: ON HOLD | OUTPATIENT
Start: 2024-08-07 | End: 2024-10-05

## 2024-08-07 NOTE — TELEPHONE ENCOUNTER
Prescription approved per [facility] Refill Protocol.  Julie Behrendt RN

## 2024-08-08 ENCOUNTER — OFFICE VISIT (OUTPATIENT)
Dept: CARDIOLOGY | Facility: CLINIC | Age: 74
End: 2024-08-08
Attending: NURSE PRACTITIONER
Payer: MEDICARE

## 2024-08-08 ENCOUNTER — ANCILLARY PROCEDURE (OUTPATIENT)
Dept: CARDIOLOGY | Facility: CLINIC | Age: 74
End: 2024-08-08
Attending: INTERNAL MEDICINE
Payer: MEDICARE

## 2024-08-08 VITALS
OXYGEN SATURATION: 97 % | SYSTOLIC BLOOD PRESSURE: 95 MMHG | BODY MASS INDEX: 22.89 KG/M2 | HEART RATE: 60 BPM | WEIGHT: 141.8 LBS | DIASTOLIC BLOOD PRESSURE: 63 MMHG

## 2024-08-08 DIAGNOSIS — I46.9 CARDIAC ARREST (H): ICD-10-CM

## 2024-08-08 DIAGNOSIS — Z95.810 ICD (IMPLANTABLE CARDIOVERTER-DEFIBRILLATOR) IN PLACE: Primary | ICD-10-CM

## 2024-08-08 DIAGNOSIS — Z95.810 ICD (IMPLANTABLE CARDIOVERTER-DEFIBRILLATOR) IN PLACE: ICD-10-CM

## 2024-08-08 DIAGNOSIS — I25.5 ISCHEMIC CARDIOMYOPATHY: ICD-10-CM

## 2024-08-08 DIAGNOSIS — I50.20 HEART FAILURE WITH REDUCED EJECTION FRACTION, NYHA CLASS I (H): ICD-10-CM

## 2024-08-08 PROCEDURE — 93283 PRGRMG EVAL IMPLANTABLE DFB: CPT | Performed by: INTERNAL MEDICINE

## 2024-08-08 PROCEDURE — 99214 OFFICE O/P EST MOD 30 MIN: CPT | Performed by: NURSE PRACTITIONER

## 2024-08-08 PROCEDURE — G0463 HOSPITAL OUTPT CLINIC VISIT: HCPCS | Performed by: NURSE PRACTITIONER

## 2024-08-08 ASSESSMENT — PAIN SCALES - GENERAL: PAINLEVEL: NO PAIN (0)

## 2024-08-08 NOTE — NURSING NOTE
Chief Complaint   Patient presents with    Follow Up     Return EP post ICD implant     Vitals were taken and medications reconciled.    Raymond Velasquez, EMT  11:35 AM

## 2024-08-08 NOTE — PATIENT INSTRUCTIONS
You were seen in the Electrophysiology Clinic today by: Sirisha Arias NP    Plan:   Medication Changes: No medication changes    Follow up Visit: Dr. Cruz 9/16/24, EP URIAH in 1 year    Device Follow up: routine device checks every 3 months      If you have further questions, please utilize [employer] to contact us.     Your Care Team:    EP Cardiology   Telephone Number     Nurse Line  Perla Ralph, RN   Dina Lynch, RN  Deric Guerra RN   [phone]     For scheduling appointments:   Jay   [phone]   For procedure scheduling:    Felisha Nicolas     [phone]   For the Device Clinic (Pacemakers, ICDs, Loop Recorders)    During business hours: [phone]  After business hours:   [phone]- select option 4 and ask for job code [id number].       On-call cardiologist for after hours or on weekends:   [phone], option #4, and ask to speak to the on-call cardiologist.     Cardiovascular Clinic:   [address] [place] [address]. [place] [address]      As always, Thank you for trusting us with your health care needs!

## 2024-08-08 NOTE — LETTER
8/8/2024      RE: Duane C Johnson  [id number] [address] [place] [id number]       Dear Colleague,    Thank you for the opportunity to participate in the care of your patient, Duane C Johnson, at the [facility] [place] at . Please see a copy of my visit note below.        ELECTROPHYSIOLOGY CLINIC VISIT    Assessment/Recommendations   Assessment/Plan:  Mr. Duane C Johnson is a 74 year old male who comes in today for EP follow-up of ICD.    # Chronic systolic heart failure/HFrEF secondary to ICM (EF 20-30% by TTE 10/23)  # s/p primary prevention ICD  Stage C. NYHA Class II-III.     Fluid status: euvolemic,   ACEi/ARB/ARNi/afterload reduction: lisinopril 1.25 mg daily, hold for SBP < 90  BB: metoprolol succinate 12.5 mg HS, hold for SBP < 90, HR < 50  Aldosterone antagonist: deferred while other medical therapy is prioritized, unable to start d/t hypotension   SGLT2i: deferred while other medical therapy is prioritized  SCD prophylaxis: s/p ICD 5/2024  NSAID use: contraindicated  Sleep apnea evaluation: eval with sleep medicine 3/12/24     # CAD s/p PCI to LAD  # STEMI  # VT/VF arrest  - on plavix and apixaban (akinesis of the mid-distal anterior,mid anteroseptum, distal septum and the apex)       Follow up with EP [facility] in 6 months, sooner if needed        History of Present Illness/Subjective    Mr. Duane C Johnson is a 74 year old male who comes in today for EP follow-up of ICD.    Mr. Aguila is a 74 year old male with medical history pertinent for anterior STEMI s/p PCI to the pLAD on 10/26/23 with a VT/VF arrest the next day (10/27) with patent stents and unchanged anatomy on repeat angiogram. Placed on amiodarone and discharged 11/03/23, ICD was not indicated as this was within 48h of his MI. His EF prior do discharge was 20-30% with a large area of akinesis in the LAD placed on apixaban due to this.      Mr. Aguila was readmitted  11/6-11/20/23 with ADHF and treated with IV lasix. He had some lightheadedness and his metoprolol dose was adjusted. Brillinta changed to the plavix due to SOB. Seen in the ED on 11/22/23 for hypotension with BPs 80s/60s. Holding lasix, metoprolol, and losartan. Of note, he was asymptomatic with hypotension.      At initial CORE visit in 11/2023, reported feeling well. Had been frequently holding losartan and metoprolol because of prescribed hold parameters. While BPs were lower with systolic pressures in the 90s-100s, he was asymptomatic. We liberalized hold parameters in order to allow for more consistency with taking GDMT.      Since our initial visit, Mr. Aguila was seen in the ED on 12/20 for chest pain with unremarkable work-up, and then admitted 12/25-12/28 for shortness of breath with bilateral pleural effusions s/p thoracentesis and ADHF exacerbation. He returned to the ED on 12/30 with SOB and was IV diuresed with bumex and his outpatient regimen was switched to PO bumex. He was instructed to hold losartan and metoprolol for hypotension.      Established with Dr. Cruz on 2/7/24. At this visit he was re-started on metoprolol succinate 12.5 mg daily. Only tolerating very small dose of lisinopril 1.25 mg daily. GDMT limited d/t hypotension. Most recently seen in the critical care survivor clinic on 2/19/24. No medication changes. TTE 2/19 showed persistently low EF 25-30%, mod-severe MR, mild-mod TR. Referred to EP for ICD evaluation and ultimately underwent ICD placement 5/2024.     Seen by Dr. Cruz 7/2024. At that time feeling well. Continues to have variably low BPs and occasionally will hold lisinopril.      Today, Mr. Aguila reports feeling well. No acute concerns. Staying active. Goes for daily walks, estimates he walks ~ 1/3 mile without stopping. Also doing some woodworking and mowed his 3 acre lawn on his riding mower. He denies chest discomfort, palpitations, abdominal fullness/bloating or  peripheral edema, shortness of breath, paroxysmal nocturnal dyspnea, orthopnea. Will have occasional episodes of lightheadedness, dizziness, pre-syncope, or syncope.     Device interrogation shows AP: 80% : 6%, Intrinsic rhythm: SB 50 bpm, Estimated battery longevity to RRT = 13 years.   Atrial Arrhythmia: None.  Anticoagulant: None.  Ventricular Arrhythmia: None.  Setting Changes: None.    Cardiac Medications   - Amiodarone 200 mg daily   - Eliquis 5 mg BID  - Atorvastatin 80 mg daily   - Plavix 75 mg daily   - Bumex 1 mg daily PRN  - Lisinopril 2.5 mg daily   - Metoprolol succinate 12.5 mg daily    I have reviewed and updated the patient's Past Medical History, Social History, Family History and Medication List.     Cardiographics (Personally Reviewed) :   2/19/2024 Echo  Interpretation Summary     1. The left ventricle is moderately dilated. Left ventricular hypertrophy is  noted by two-dimensional echocardiography. Proximal septal thickening is  noted. Left ventricular systolic function is moderate to severely reduced. The  visual ejection fraction is 25-30%. Biplane LVEF is 25%. Diastolic Doppler  findings (E/E' ratio and/or other parameters) suggest left ventricular filling  pressures are increased. There is severe hypokinesis to akinesis of the mid  anterior/anterolateral/anteroseptal/inferoseptal walls and all apical segments  of the left ventricle. There is no thrombus seen in the left ventricle.  2. The right ventricle is normal size. The right ventricular systolic function  is normal.  3. The left atrium is moderately dilated. Right atrial size is normal. There  is no color Doppler evidence of an atrial shunt.  4. There is moderate to mod-severe (2-3+) mitral regurgitation.  5. There is mild to moderate (1-2+) tricuspid regurgitation.Right ventricular  systolic pressure is elevated, consistent with moderate pulmonary  hypertension.  6. No pericardial effusion.  7. In direct comparison to the previous  study dated 10/30/2023, there has been  an interval increase in the degree of mitral regurgitation. The other findings  are similar.     12/6/2023 CMR  Clinical history: 73 year old with anterior STEMI, cardiac arrest in Oct 2023     Comparison CMR: none     1. The left ventricle is severely enlarged. There is wall thinning and akinesis of the mid-distal anterior,  mid anteroseptum, distal septum and the apex  The global systolic function is severely reduced. The LVEF is  28%.     2. The right ventricle is normal in cavity size. The global systolic function is normal. The RVEF is 52%.      3. The right atrium is normal and the left atrium is severely enlarged.     4. There is mild mitral regurgitation.      5. There is transmural late gadolinium enhancement in mid-distal anterior, mid anteroseptum, distal  sepum  and the apex consistent with a large infarction in the LAD territory.      6. There is no pericardial effusion.     7. There is no intracardiac thrombus.     8. There are bilateral pleural effusions.        CONCLUSIONS:   Ischemic cardiomyopathy with a large anteroapical infarction.   Severely reduced left ventricular function and normal right ventricular function, LVEF 28% and RVEF 52%.        10/30/2023 Echo  Interpretation Summary  Ischemic CM. Large LAD MI. Left ventricular function is decreased. The  ejection fraction is 20-30% (severely reduced).  Global right ventricular function is normal.  No significant valvular abnormalities present.  IVC diameter <2.1 cm collapsing >50% with sniff suggests a normal RA pressure  of 3 mmHg.  No pericardial effusion is present.  No significant changes noted.          Physical Examination   There were no vitals taken for this visit.  Wt Readings from Last 3 Encounters:   07/05/24 59.9 kg (132 lb 1.6 oz)   06/26/24 63.3 kg (139 lb 9.6 oz)   06/19/24 59.9 kg (132 lb)     General Appearance:   Alert, well-appearing and in no acute distress.   HEENT: Atraumatic,  normocephalic. PERRL.  MMM.   Chest/Lungs:   Respirations unlabored.  Lungs are clear to auscultation.   Cardiovascular:   Regular rate and rhythm.  S1/S2. No murmur.    Abdomen:  Soft, nontender, nondistended.   Extremities: No cyanosis or clubbing. No edema.    Musculoskeletal: Moves all extremities.  Gait wnl.   Skin: Warm, dry, intact.    Neurologic: Mood and affect are appropriate.  Alert and oriented to person, place, time, and situation.          Medications  Allergies   Current Outpatient Medications   Medication Sig Dispense Refill     amiodarone (PACERONE) 200 MG tablet Take 1 tablet (200 mg) by mouth daily 90 tablet 3     atorvastatin (LIPITOR) 80 MG tablet Take 1 tablet (80 mg) by mouth daily 90 tablet 3     bumetanide (BUMEX) 1 MG tablet Take 1 tablet (1 mg) by mouth daily as needed (weight gain, swelling or increased shortness of breath.) 30 tablet 3     cetirizine (ZYRTEC) 10 MG tablet Take 10 mg by mouth daily       clopidogrel (PLAVIX) 75 MG tablet Take 1 tablet (75 mg) by mouth daily 30 tablet 3     empagliflozin (JARDIANCE) 10 MG TABS tablet Take 1 tablet (10 mg) by mouth daily 90 tablet 3     escitalopram (LEXAPRO) 10 MG tablet Take 10 mg by mouth daily       fish oil-omega-3 fatty acids 1000 MG capsule Take 1 g by mouth 2 times daily Also contains another supplement       lisinopril (ZESTRIL) 2.5 MG tablet Take 1 tablet (2.5 mg) by mouth daily HOLD for systolic blood pressure < 90 90 tablet 1     magnesium oxide (MAG-OX) 400 MG tablet Take 1 tablet (400 mg) by mouth daily 90 tablet 3     melatonin 5 MG tablet Take 1-2 tablets (5-10 mg) by mouth at bedtime 60 tablet 0     metoprolol succinate ER (TOPROL XL) 25 MG 24 hr tablet Take 0.5 tablets (12.5 mg) by mouth daily Hold for systolic blood pressure less than 90. 45 tablet 1     multivitamin w/minerals (THERA-VIT-M) tablet Take 1 tablet by mouth 2 times daily       potassium chloride tray ER (KLOR-CON M20) 20 MEQ CR tablet TAKE ONE TABLET BY  MOUTH ONCE DAILY 30 tablet 2     vitamin C (ASCORBIC ACID) 1000 MG TABS Take 1,000 mg by mouth daily      Allergies   Allergen Reactions     Brilinta [Ticagrelor] Other (See Comments) and Difficulty breathing     Per pt and spouse, hyperventilation.     Clonazepam Other (See Comments)     Per spouse, acted like a zombie and he was shaky, could barely talk.         Lab Results (Personally Reviewed)    Chemistry/lipid CBC Cardiac Enzymes/BNP/TSH/INR   Lab Results   Component Value Date    BUN 28.5 (H) 06/29/2024     06/29/2024    CO2 27 06/29/2024     Creatinine   Date Value Ref Range Status   06/29/2024 0.98 0.67 - 1.17 mg/dL Final   05/26/2021 0.80 0.66 - 1.25 mg/dL Final       Lab Results   Component Value Date    CHOL 110 02/07/2024    HDL 47 02/07/2024    LDL 51 02/07/2024      Lab Results   Component Value Date    WBC 5.6 05/08/2024    HGB 11.4 (L) 05/08/2024    HCT 34.4 (L) 05/08/2024    MCV 93 05/08/2024     05/08/2024    Lab Results   Component Value Date    TSH 5.24 (H) 12/30/2023    INR 1.44 (H) 12/27/2023        The patient states understanding and is agreeable with the plan.   Sirisha Arias DNP, NP-C  Cardiac Electrophysiology   08/08/24      Total time spent on patient visit, reviewing notes, imaging, labs, orders, and completing necessary documentation: 30 minutes.       Please do not hesitate to contact me if you have any questions/concerns.     Sincerely,     NADEGE Putnam CNP

## 2024-08-08 NOTE — PATIENT INSTRUCTIONS
It was a pleasure to see you in clinic today.  Please do not hesitate to call with any questions or concerns.  You are scheduled for a remote transmission from home on 11/18/2024.      Adia Hawley RN.  Electrophysiology Nurse Clinician  [facility]    During Business Hours Please Call:  [phone]  After Hours Please Call:  [phone] - select option #[phone] and ask for job code [phone]

## 2024-08-08 NOTE — PROGRESS NOTES
ELECTROPHYSIOLOGY CLINIC VISIT    Assessment/Recommendations   Assessment/Plan:  Mr. Duane C Johnson is a 74 year old male who comes in today for EP follow-up of ICD.    # Chronic systolic heart failure/HFrEF secondary to ICM (EF 20-30% by TTE 10/23)  # s/p primary prevention ICD  Stage C. NYHA Class II-III.     Fluid status: euvolemic,   ACEi/ARB/ARNi/afterload reduction: lisinopril 1.25 mg daily, hold for SBP < 90  BB: metoprolol succinate 12.5 mg HS, hold for SBP < 90, HR < 50  Aldosterone antagonist: deferred while other medical therapy is prioritized, unable to start d/t hypotension   SGLT2i: deferred while other medical therapy is prioritized  SCD prophylaxis: s/p ICD 5/2024  NSAID use: contraindicated  Sleep apnea evaluation: eval with sleep medicine 3/12/24     # CAD s/p PCI to LAD  # STEMI  # VT/VF arrest  - on plavix and apixaban (akinesis of the mid-distal anterior,mid anteroseptum, distal septum and the apex)       Follow up with EP [facility] in 6 months, sooner if needed        History of Present Illness/Subjective    Mr. Duane C Johnson is a 74 year old male who comes in today for EP follow-up of ICD.    Mr. Aguila is a 74 year old male with medical history pertinent for anterior STEMI s/p PCI to the pLAD on 10/26/23 with a VT/VF arrest the next day (10/27) with patent stents and unchanged anatomy on repeat angiogram. Placed on amiodarone and discharged 11/03/23, ICD was not indicated as this was within 48h of his MI. His EF prior do discharge was 20-30% with a large area of akinesis in the LAD placed on apixaban due to this.      Mr. Aguila was readmitted 11/6-11/20/23 with ADHF and treated with IV lasix. He had some lightheadedness and his metoprolol dose was adjusted. Brillinta changed to the plavix due to SOB. Seen in the ED on 11/22/23 for hypotension with BPs 80s/60s. Holding lasix, metoprolol, and losartan. Of note, he was asymptomatic with hypotension.      At initial CORE visit in 11/2023,  reported feeling well. Had been frequently holding losartan and metoprolol because of prescribed hold parameters. While BPs were lower with systolic pressures in the 90s-100s, he was asymptomatic. We liberalized hold parameters in order to allow for more consistency with taking GDMT.      Since our initial visit, Mr. Aguila was seen in the ED on 12/20 for chest pain with unremarkable work-up, and then admitted 12/25-12/28 for shortness of breath with bilateral pleural effusions s/p thoracentesis and ADHF exacerbation. He returned to the ED on 12/30 with SOB and was IV diuresed with bumex and his outpatient regimen was switched to PO bumex. He was instructed to hold losartan and metoprolol for hypotension.      Established with Dr. Cruz on 2/7/24. At this visit he was re-started on metoprolol succinate 12.5 mg daily. Only tolerating very small dose of lisinopril 1.25 mg daily. GDMT limited d/t hypotension. Most recently seen in the critical care survivor clinic on 2/19/24. No medication changes. TTE 2/19 showed persistently low EF 25-30%, mod-severe MR, mild-mod TR. Referred to EP for ICD evaluation and ultimately underwent ICD placement 5/2024.     Seen by Dr. Cruz 7/2024. At that time feeling well. Continues to have variably low BPs and occasionally will hold lisinopril.      Today, Mr. Aguila reports feeling well. No acute concerns. Staying active. Goes for daily walks, estimates he walks ~ 1/3 mile without stopping. Also doing some woodworking and mowed his 3 acre lawn on his riding mower. He denies chest discomfort, palpitations, abdominal fullness/bloating or peripheral edema, shortness of breath, paroxysmal nocturnal dyspnea, orthopnea. Will have occasional episodes of lightheadedness, dizziness, pre-syncope, or syncope.     Device interrogation shows AP: 80% : 6%, Intrinsic rhythm: SB 50 bpm, Estimated battery longevity to RRT = 13 years.   Atrial Arrhythmia: None.  Anticoagulant: None.  Ventricular  Arrhythmia: None.  Setting Changes: None.    Cardiac Medications   - Amiodarone 200 mg daily   - Eliquis 5 mg BID  - Atorvastatin 80 mg daily   - Plavix 75 mg daily   - Bumex 1 mg daily PRN  - Lisinopril 2.5 mg daily   - Metoprolol succinate 12.5 mg daily    I have reviewed and updated the patient's Past Medical History, Social History, Family History and Medication List.     Cardiographics (Personally Reviewed) :   2/19/2024 Echo  Interpretation Summary     1. The left ventricle is moderately dilated. Left ventricular hypertrophy is  noted by two-dimensional echocardiography. Proximal septal thickening is  noted. Left ventricular systolic function is moderate to severely reduced. The  visual ejection fraction is 25-30%. Biplane LVEF is 25%. Diastolic Doppler  findings (E/E' ratio and/or other parameters) suggest left ventricular filling  pressures are increased. There is severe hypokinesis to akinesis of the mid  anterior/anterolateral/anteroseptal/inferoseptal walls and all apical segments  of the left ventricle. There is no thrombus seen in the left ventricle.  2. The right ventricle is normal size. The right ventricular systolic function  is normal.  3. The left atrium is moderately dilated. Right atrial size is normal. There  is no color Doppler evidence of an atrial shunt.  4. There is moderate to mod-severe (2-3+) mitral regurgitation.  5. There is mild to moderate (1-2+) tricuspid regurgitation.Right ventricular  systolic pressure is elevated, consistent with moderate pulmonary  hypertension.  6. No pericardial effusion.  7. In direct comparison to the previous study dated 10/30/2023, there has been  an interval increase in the degree of mitral regurgitation. The other findings  are similar.     12/6/2023 CMR  Clinical history: 73 year old with anterior STEMI, cardiac arrest in Oct 2023     Comparison CMR: none     1. The left ventricle is severely enlarged. There is wall thinning and akinesis of the  mid-distal anterior,  mid anteroseptum, distal septum and the apex  The global systolic function is severely reduced. The LVEF is  28%.     2. The right ventricle is normal in cavity size. The global systolic function is normal. The RVEF is 52%.      3. The right atrium is normal and the left atrium is severely enlarged.     4. There is mild mitral regurgitation.      5. There is transmural late gadolinium enhancement in mid-distal anterior, mid anteroseptum, distal  sepum  and the apex consistent with a large infarction in the LAD territory.      6. There is no pericardial effusion.     7. There is no intracardiac thrombus.     8. There are bilateral pleural effusions.        CONCLUSIONS:   Ischemic cardiomyopathy with a large anteroapical infarction.   Severely reduced left ventricular function and normal right ventricular function, LVEF 28% and RVEF 52%.        10/30/2023 Echo  Interpretation Summary  Ischemic CM. Large LAD MI. Left ventricular function is decreased. The  ejection fraction is 20-30% (severely reduced).  Global right ventricular function is normal.  No significant valvular abnormalities present.  IVC diameter <2.1 cm collapsing >50% with sniff suggests a normal RA pressure  of 3 mmHg.  No pericardial effusion is present.  No significant changes noted.          Physical Examination   There were no vitals taken for this visit.  Wt Readings from Last 3 Encounters:   07/05/24 59.9 kg (132 lb 1.6 oz)   06/26/24 63.3 kg (139 lb 9.6 oz)   06/19/24 59.9 kg (132 lb)     General Appearance:   Alert, well-appearing and in no acute distress.   HEENT: Atraumatic, normocephalic. PERRL.  MMM.   Chest/Lungs:   Respirations unlabored.  Lungs are clear to auscultation.   Cardiovascular:   Regular rate and rhythm.  S1/S2. No murmur.    Abdomen:  Soft, nontender, nondistended.   Extremities: No cyanosis or clubbing. No edema.    Musculoskeletal: Moves all extremities.  Gait wnl.   Skin: Warm, dry, intact.     Neurologic: Mood and affect are appropriate.  Alert and oriented to person, place, time, and situation.          Medications  Allergies   Current Outpatient Medications   Medication Sig Dispense Refill    amiodarone (PACERONE) 200 MG tablet Take 1 tablet (200 mg) by mouth daily 90 tablet 3    atorvastatin (LIPITOR) 80 MG tablet Take 1 tablet (80 mg) by mouth daily 90 tablet 3    bumetanide (BUMEX) 1 MG tablet Take 1 tablet (1 mg) by mouth daily as needed (weight gain, swelling or increased shortness of breath.) 30 tablet 3    cetirizine (ZYRTEC) 10 MG tablet Take 10 mg by mouth daily      clopidogrel (PLAVIX) 75 MG tablet Take 1 tablet (75 mg) by mouth daily 30 tablet 3    empagliflozin (JARDIANCE) 10 MG TABS tablet Take 1 tablet (10 mg) by mouth daily 90 tablet 3    escitalopram (LEXAPRO) 10 MG tablet Take 10 mg by mouth daily      fish oil-omega-3 fatty acids 1000 MG capsule Take 1 g by mouth 2 times daily Also contains another supplement      lisinopril (ZESTRIL) 2.5 MG tablet Take 1 tablet (2.5 mg) by mouth daily HOLD for systolic blood pressure < 90 90 tablet 1    magnesium oxide (MAG-OX) 400 MG tablet Take 1 tablet (400 mg) by mouth daily 90 tablet 3    melatonin 5 MG tablet Take 1-2 tablets (5-10 mg) by mouth at bedtime 60 tablet 0    metoprolol succinate ER (TOPROL XL) 25 MG 24 hr tablet Take 0.5 tablets (12.5 mg) by mouth daily Hold for systolic blood pressure less than 90. 45 tablet 1    multivitamin w/minerals (THERA-VIT-M) tablet Take 1 tablet by mouth 2 times daily      potassium chloride tray ER (KLOR-CON M20) 20 MEQ CR tablet TAKE ONE TABLET BY MOUTH ONCE DAILY 30 tablet 2    vitamin C (ASCORBIC ACID) 1000 MG TABS Take 1,000 mg by mouth daily      Allergies   Allergen Reactions    Brilinta [Ticagrelor] Other (See Comments) and Difficulty breathing     Per pt and spouse, hyperventilation.    Clonazepam Other (See Comments)     Per spouse, acted like a zombie and he was shaky, could barely talk.          Lab Results (Personally Reviewed)    Chemistry/lipid CBC Cardiac Enzymes/BNP/TSH/INR   Lab Results   Component Value Date    BUN 28.5 (H) 06/29/2024     06/29/2024    CO2 27 06/29/2024     Creatinine   Date Value Ref Range Status   06/29/2024 0.98 0.67 - 1.17 mg/dL Final   05/26/2021 0.80 0.66 - 1.25 mg/dL Final       Lab Results   Component Value Date    CHOL 110 02/07/2024    HDL 47 02/07/2024    LDL 51 02/07/2024      Lab Results   Component Value Date    WBC 5.6 05/08/2024    HGB 11.4 (L) 05/08/2024    HCT 34.4 (L) 05/08/2024    MCV 93 05/08/2024     05/08/2024    Lab Results   Component Value Date    TSH 5.24 (H) 12/30/2023    INR 1.44 (H) 12/27/2023        The patient states understanding and is agreeable with the plan.   Sirisha Arias DNP, NP-C  Cardiac Electrophysiology   08/08/24      Total time spent on patient visit, reviewing notes, imaging, labs, orders, and completing necessary documentation: 30 minutes.

## 2024-08-12 RX ORDER — CLOPIDOGREL BISULFATE 75 MG/1
75 TABLET ORAL DAILY
Qty: 30 TABLET | Refills: 3 | Status: ON HOLD | OUTPATIENT
Start: 2024-08-12 | End: 2024-10-05

## 2024-08-21 LAB
MDC_IDC_LEAD_CONNECTION_STATUS: NORMAL
MDC_IDC_LEAD_CONNECTION_STATUS: NORMAL
MDC_IDC_LEAD_IMPLANT_DT: NORMAL
MDC_IDC_LEAD_IMPLANT_DT: NORMAL
MDC_IDC_LEAD_LOCATION: NORMAL
MDC_IDC_LEAD_LOCATION: NORMAL
MDC_IDC_LEAD_LOCATION_DETAIL_1: NORMAL
MDC_IDC_LEAD_LOCATION_DETAIL_1: NORMAL
MDC_IDC_LEAD_MFG: NORMAL
MDC_IDC_LEAD_MFG: NORMAL
MDC_IDC_LEAD_MODEL: NORMAL
MDC_IDC_LEAD_MODEL: NORMAL
MDC_IDC_LEAD_POLARITY_TYPE: NORMAL
MDC_IDC_LEAD_POLARITY_TYPE: NORMAL
MDC_IDC_LEAD_SERIAL: NORMAL
MDC_IDC_LEAD_SERIAL: NORMAL
MDC_IDC_LEAD_SPECIAL_FUNCTION: NORMAL
MDC_IDC_LEAD_SPECIAL_FUNCTION: NORMAL
MDC_IDC_MSMT_BATTERY_DTM: NORMAL
MDC_IDC_MSMT_BATTERY_REMAINING_LONGEVITY: 150 MO
MDC_IDC_MSMT_BATTERY_REMAINING_PERCENTAGE: 100 %
MDC_IDC_MSMT_BATTERY_STATUS: NORMAL
MDC_IDC_MSMT_CAP_CHARGE_DTM: NORMAL
MDC_IDC_MSMT_CAP_CHARGE_TIME: 9.4 S
MDC_IDC_MSMT_CAP_CHARGE_TYPE: NORMAL
MDC_IDC_MSMT_LEADCHNL_RA_IMPEDANCE_VALUE: 640 OHM
MDC_IDC_MSMT_LEADCHNL_RA_PACING_THRESHOLD_AMPLITUDE: 0.9 V
MDC_IDC_MSMT_LEADCHNL_RA_PACING_THRESHOLD_PULSEWIDTH: 0.4 MS
MDC_IDC_MSMT_LEADCHNL_RV_IMPEDANCE_VALUE: 340 OHM
MDC_IDC_MSMT_LEADCHNL_RV_PACING_THRESHOLD_AMPLITUDE: 0.7 V
MDC_IDC_MSMT_LEADCHNL_RV_PACING_THRESHOLD_PULSEWIDTH: 0.4 MS
MDC_IDC_PG_IMPLANT_DTM: NORMAL
MDC_IDC_PG_MFG: NORMAL
MDC_IDC_PG_MODEL: NORMAL
MDC_IDC_PG_SERIAL: NORMAL
MDC_IDC_PG_TYPE: NORMAL
MDC_IDC_SESS_CLINIC_NAME: NORMAL
MDC_IDC_SESS_DTM: NORMAL
MDC_IDC_SESS_TYPE: NORMAL
MDC_IDC_SET_BRADY_AT_MODE_SWITCH_MODE: NORMAL
MDC_IDC_SET_BRADY_AT_MODE_SWITCH_RATE: 170 {BEATS}/MIN
MDC_IDC_SET_BRADY_LOWRATE: 60 {BEATS}/MIN
MDC_IDC_SET_BRADY_MAX_SENSOR_RATE: 130 {BEATS}/MIN
MDC_IDC_SET_BRADY_MAX_TRACKING_RATE: 130 {BEATS}/MIN
MDC_IDC_SET_BRADY_MODE: NORMAL
MDC_IDC_SET_BRADY_PAV_DELAY_HIGH: 200 MS
MDC_IDC_SET_BRADY_PAV_DELAY_LOW: 300 MS
MDC_IDC_SET_BRADY_SAV_DELAY_HIGH: 200 MS
MDC_IDC_SET_BRADY_SAV_DELAY_LOW: 300 MS
MDC_IDC_SET_LEADCHNL_RA_PACING_AMPLITUDE: 2 V
MDC_IDC_SET_LEADCHNL_RA_PACING_CAPTURE_MODE: NORMAL
MDC_IDC_SET_LEADCHNL_RA_PACING_POLARITY: NORMAL
MDC_IDC_SET_LEADCHNL_RA_PACING_PULSEWIDTH: 0.4 MS
MDC_IDC_SET_LEADCHNL_RA_SENSING_ADAPTATION_MODE: NORMAL
MDC_IDC_SET_LEADCHNL_RA_SENSING_POLARITY: NORMAL
MDC_IDC_SET_LEADCHNL_RA_SENSING_SENSITIVITY: 0.25 MV
MDC_IDC_SET_LEADCHNL_RV_PACING_AMPLITUDE: 2 V
MDC_IDC_SET_LEADCHNL_RV_PACING_CAPTURE_MODE: NORMAL
MDC_IDC_SET_LEADCHNL_RV_PACING_POLARITY: NORMAL
MDC_IDC_SET_LEADCHNL_RV_PACING_PULSEWIDTH: 0.4 MS
MDC_IDC_SET_LEADCHNL_RV_SENSING_ADAPTATION_MODE: NORMAL
MDC_IDC_SET_LEADCHNL_RV_SENSING_POLARITY: NORMAL
MDC_IDC_SET_LEADCHNL_RV_SENSING_SENSITIVITY: 0.6 MV
MDC_IDC_SET_ZONE_DETECTION_INTERVAL: 250 MS
MDC_IDC_SET_ZONE_DETECTION_INTERVAL: 300 MS
MDC_IDC_SET_ZONE_DETECTION_INTERVAL: 353 MS
MDC_IDC_SET_ZONE_STATUS: NORMAL
MDC_IDC_SET_ZONE_TYPE: NORMAL
MDC_IDC_SET_ZONE_VENDOR_TYPE: NORMAL
MDC_IDC_STAT_AT_BURDEN_PERCENT: 0 %
MDC_IDC_STAT_AT_DTM_END: NORMAL
MDC_IDC_STAT_AT_DTM_START: NORMAL
MDC_IDC_STAT_BRADY_DTM_END: NORMAL
MDC_IDC_STAT_BRADY_DTM_START: NORMAL
MDC_IDC_STAT_BRADY_RA_PERCENT_PACED: 80 %
MDC_IDC_STAT_BRADY_RV_PERCENT_PACED: 6 %
MDC_IDC_STAT_EPISODE_RECENT_COUNT: 0
MDC_IDC_STAT_EPISODE_RECENT_COUNT_DTM_END: NORMAL
MDC_IDC_STAT_EPISODE_RECENT_COUNT_DTM_START: NORMAL
MDC_IDC_STAT_EPISODE_TYPE: NORMAL
MDC_IDC_STAT_EPISODE_VENDOR_TYPE: NORMAL
MDC_IDC_STAT_TACHYTHERAPY_ATP_DELIVERED_RECENT: 0
MDC_IDC_STAT_TACHYTHERAPY_ATP_DELIVERED_TOTAL: 0
MDC_IDC_STAT_TACHYTHERAPY_RECENT_DTM_END: NORMAL
MDC_IDC_STAT_TACHYTHERAPY_RECENT_DTM_START: NORMAL
MDC_IDC_STAT_TACHYTHERAPY_SHOCKS_ABORTED_RECENT: 0
MDC_IDC_STAT_TACHYTHERAPY_SHOCKS_ABORTED_TOTAL: 0
MDC_IDC_STAT_TACHYTHERAPY_SHOCKS_DELIVERED_RECENT: 0
MDC_IDC_STAT_TACHYTHERAPY_SHOCKS_DELIVERED_TOTAL: 0
MDC_IDC_STAT_TACHYTHERAPY_TOTAL_DTM_END: NORMAL
MDC_IDC_STAT_TACHYTHERAPY_TOTAL_DTM_START: NORMAL

## 2024-08-24 ENCOUNTER — HOSPITAL ENCOUNTER (EMERGENCY)
Facility: CLINIC | Age: 74
Discharge: HOME | End: 2024-08-24
Attending: FAMILY MEDICINE | Admitting: FAMILY MEDICINE
Payer: MEDICARE

## 2024-08-24 ENCOUNTER — APPOINTMENT (OUTPATIENT)
Dept: GENERAL RADIOLOGY | Facility: CLINIC | Age: 74
End: 2024-08-24
Attending: FAMILY MEDICINE
Payer: MEDICARE

## 2024-08-24 VITALS
HEIGHT: 66 IN | OXYGEN SATURATION: 96 % | RESPIRATION RATE: 16 BRPM | HEART RATE: 65 BPM | TEMPERATURE: 100.3 F | BODY MASS INDEX: 22.89 KG/M2 | DIASTOLIC BLOOD PRESSURE: 58 MMHG | SYSTOLIC BLOOD PRESSURE: 99 MMHG

## 2024-08-24 DIAGNOSIS — R53.83 FATIGUE, UNSPECIFIED TYPE: ICD-10-CM

## 2024-08-24 LAB
ALBUMIN SERPL BCG-MCNC: 3.4 G/DL (ref 3.5–5.2)
ALBUMIN UR-MCNC: 100 MG/DL
ALP SERPL-CCNC: 85 U/L (ref 40–150)
ALT SERPL W P-5'-P-CCNC: 36 U/L (ref 0–70)
ANION GAP SERPL CALCULATED.3IONS-SCNC: 8 MMOL/L (ref 7–15)
APPEARANCE UR: ABNORMAL
AST SERPL W P-5'-P-CCNC: 51 U/L (ref 0–45)
BACTERIA #/AREA URNS HPF: ABNORMAL /HPF
BASOPHILS # BLD AUTO: 0 10E3/UL (ref 0–0.2)
BASOPHILS NFR BLD AUTO: 0 %
BILIRUB SERPL-MCNC: 0.6 MG/DL
BILIRUB UR QL STRIP: NEGATIVE
BUN SERPL-MCNC: 29.1 MG/DL (ref 8–23)
CALCIUM SERPL-MCNC: 8.5 MG/DL (ref 8.8–10.4)
CHLORIDE SERPL-SCNC: 100 MMOL/L (ref 98–107)
COLOR UR AUTO: YELLOW
CREAT SERPL-MCNC: 1.55 MG/DL (ref 0.67–1.17)
EGFRCR SERPLBLD CKD-EPI 2021: 47 ML/MIN/1.73M2
EOSINOPHIL # BLD AUTO: 0 10E3/UL (ref 0–0.7)
EOSINOPHIL NFR BLD AUTO: 0 %
ERYTHROCYTE [DISTWIDTH] IN BLOOD BY AUTOMATED COUNT: 14.7 % (ref 10–15)
FLUAV RNA SPEC QL NAA+PROBE: NEGATIVE
FLUBV RNA RESP QL NAA+PROBE: NEGATIVE
GLUCOSE SERPL-MCNC: 124 MG/DL (ref 70–99)
GLUCOSE UR STRIP-MCNC: >499 MG/DL
GRANULAR CAST: 5 /LPF
HCO3 SERPL-SCNC: 26 MMOL/L (ref 22–29)
HCT VFR BLD AUTO: 32.3 % (ref 40–53)
HGB BLD-MCNC: 11.1 G/DL (ref 13.3–17.7)
HGB UR QL STRIP: ABNORMAL
HOLD SPECIMEN: NORMAL
IMM GRANULOCYTES # BLD: 0 10E3/UL
IMM GRANULOCYTES NFR BLD: 0 %
KETONES UR STRIP-MCNC: NEGATIVE MG/DL
LACTATE SERPL-SCNC: 1.3 MMOL/L (ref 0.7–2)
LEUKOCYTE ESTERASE UR QL STRIP: NEGATIVE
LYMPHOCYTES # BLD AUTO: 0.3 10E3/UL (ref 0.8–5.3)
LYMPHOCYTES NFR BLD AUTO: 6 %
MAGNESIUM SERPL-MCNC: 2.2 MG/DL (ref 1.7–2.3)
MCH RBC QN AUTO: 32.5 PG (ref 26.5–33)
MCHC RBC AUTO-ENTMCNC: 34.4 G/DL (ref 31.5–36.5)
MCV RBC AUTO: 94 FL (ref 78–100)
MONOCYTES # BLD AUTO: 0.6 10E3/UL (ref 0–1.3)
MONOCYTES NFR BLD AUTO: 11 %
MUCOUS THREADS #/AREA URNS LPF: PRESENT /LPF
NEUTROPHILS # BLD AUTO: 4.4 10E3/UL (ref 1.6–8.3)
NEUTROPHILS NFR BLD AUTO: 83 %
NITRATE UR QL: NEGATIVE
NRBC # BLD AUTO: 0 10E3/UL
NRBC BLD AUTO-RTO: 0 /100
PH UR STRIP: 5 [PH] (ref 5–7)
PLATELET # BLD AUTO: 156 10E3/UL (ref 150–450)
POTASSIUM SERPL-SCNC: 4.1 MMOL/L (ref 3.4–5.3)
PROT SERPL-MCNC: 6.8 G/DL (ref 6.4–8.3)
RBC # BLD AUTO: 3.42 10E6/UL (ref 4.4–5.9)
RBC URINE: 12 /HPF
RSV RNA SPEC NAA+PROBE: NEGATIVE
SARS-COV-2 RNA RESP QL NAA+PROBE: NEGATIVE
SODIUM SERPL-SCNC: 134 MMOL/L (ref 135–145)
SP GR UR STRIP: 1.03 (ref 1–1.03)
TSH SERPL DL<=0.005 MIU/L-ACNC: 1.61 UIU/ML (ref 0.3–4.2)
UROBILINOGEN UR STRIP-MCNC: NORMAL MG/DL
WBC # BLD AUTO: 5.3 10E3/UL (ref 4–11)
WBC URINE: 2 /HPF

## 2024-08-24 PROCEDURE — 87040 BLOOD CULTURE FOR BACTERIA: CPT | Performed by: FAMILY MEDICINE

## 2024-08-24 PROCEDURE — 83605 ASSAY OF LACTIC ACID: CPT | Performed by: FAMILY MEDICINE

## 2024-08-24 PROCEDURE — 80053 COMPREHEN METABOLIC PANEL: CPT | Performed by: FAMILY MEDICINE

## 2024-08-24 PROCEDURE — 85004 AUTOMATED DIFF WBC COUNT: CPT | Performed by: FAMILY MEDICINE

## 2024-08-24 PROCEDURE — 36415 COLL VENOUS BLD VENIPUNCTURE: CPT | Performed by: FAMILY MEDICINE

## 2024-08-24 PROCEDURE — 87637 SARSCOV2&INF A&B&RSV AMP PRB: CPT | Performed by: FAMILY MEDICINE

## 2024-08-24 PROCEDURE — 71046 X-RAY EXAM CHEST 2 VIEWS: CPT

## 2024-08-24 PROCEDURE — 96361 HYDRATE IV INFUSION ADD-ON: CPT

## 2024-08-24 PROCEDURE — 93010 ELECTROCARDIOGRAM REPORT: CPT | Performed by: FAMILY MEDICINE

## 2024-08-24 PROCEDURE — 258N000003 HC RX IP 258 OP 636: Performed by: FAMILY MEDICINE

## 2024-08-24 PROCEDURE — 99285 EMERGENCY DEPT VISIT HI MDM: CPT | Mod: 25

## 2024-08-24 PROCEDURE — 93005 ELECTROCARDIOGRAM TRACING: CPT

## 2024-08-24 PROCEDURE — 99284 EMERGENCY DEPT VISIT MOD MDM: CPT | Performed by: FAMILY MEDICINE

## 2024-08-24 PROCEDURE — 81001 URINALYSIS AUTO W/SCOPE: CPT | Performed by: FAMILY MEDICINE

## 2024-08-24 PROCEDURE — 83735 ASSAY OF MAGNESIUM: CPT | Performed by: FAMILY MEDICINE

## 2024-08-24 PROCEDURE — 84443 ASSAY THYROID STIM HORMONE: CPT | Performed by: FAMILY MEDICINE

## 2024-08-24 PROCEDURE — 96365 THER/PROPH/DIAG IV INF INIT: CPT

## 2024-08-24 RX ORDER — DEXTROSE MONOHYDRATE AND SODIUM CHLORIDE 5; .9 G/100ML; G/100ML
INJECTION, SOLUTION INTRAVENOUS CONTINUOUS
Status: DISCONTINUED | OUTPATIENT
Start: 2024-08-24 | End: 2024-08-24 | Stop reason: HOSPADM

## 2024-08-24 RX ADMIN — SODIUM CHLORIDE 1000 ML: 9 INJECTION, SOLUTION INTRAVENOUS at 13:06

## 2024-08-24 RX ADMIN — DEXTROSE AND SODIUM CHLORIDE: 5; 900 INJECTION, SOLUTION INTRAVENOUS at 14:02

## 2024-08-24 ASSESSMENT — ACTIVITIES OF DAILY LIVING (ADL)
ADLS_ACUITY_SCORE: 37

## 2024-08-24 ASSESSMENT — COLUMBIA-SUICIDE SEVERITY RATING SCALE - C-SSRS
1. IN THE PAST MONTH, HAVE YOU WISHED YOU WERE DEAD OR WISHED YOU COULD GO TO SLEEP AND NOT WAKE UP?: NO
2. HAVE YOU ACTUALLY HAD ANY THOUGHTS OF KILLING YOURSELF IN THE PAST MONTH?: NO
6. HAVE YOU EVER DONE ANYTHING, STARTED TO DO ANYTHING, OR PREPARED TO DO ANYTHING TO END YOUR LIFE?: NO

## 2024-08-24 NOTE — ED PROVIDER NOTES
My assessment, based on my focused history, established that Duane C Johnson has a potential emergent condition, which requires further testing and/or treatment beyond the capabilities of the current urgent care setting.  This condition was discussed with the patient as being several days of increasing fatigue and weakness. Patient has a hx of BESSIE and stent placement. Vitals significant for hypotension and fever.  Testing may require imaging, and/or blood testing.  As such, I have recommended that the patient be evaluated and treated in the  ED.      Disclaimer: This note consists of symbols derived from keyboarding, dictation, and/or voice recognition software. As a result, there may be errors in the script that have gone undetected.  Please consider this when interpreting information found in the chart.         Ester Abarca PA-C  08/24/24 [id number]

## 2024-08-24 NOTE — DISCHARGE INSTRUCTIONS
RETURN TO THE EMERGENCY ROOM FOR THE FOLLOWING:    New trouble with breathing, new chest pain, fever greater than 101, fainting, or at anytime for any concern.    FOLLOW UP:    Consider follow-up with your primary clinic for further discussion.  Referral order was placed at the time of discharge, expect a phone call within the next few days to help with scheduling.    TREATMENT RECOMMENDATIONS:    There have been no new medications provided and there are no prescription medication changes recommended.     NURSE ADVICE LINE:  [phone] or [phone]

## 2024-08-24 NOTE — ED PROVIDER NOTES
"  HPI   Patient is a 74-year-old male presenting with his wife by private car for fatigue and change in blood pressure.  Per my chart review, the patient has a known history of ischemic cardiomyopathy, EF 25%.  He has a typical systolic blood pressure of 90s-low 100s.  Known history of prostate cancer and a designation of, \"being cancer free,\" per his wife.  No recent alcohol.  Taking medications as directed.  His wife is acting as an independent historian.    The patient describes generalized weakness over the past week or so.  It has worsened with time.  He has had a decreased appetite.  He has been sleeping 12+ hours a day.  He denies fever symptoms.  No headache.  No sore throat.  No nasal congestion or facial pain/pressure.  No dental pain.  No neck pain or stiffness.  No chest pain.  No trouble with breathing.  No abdominal, pelvic, or flank pain.  No nausea or vomiting.  He endorses decreased appetite.  No dysuria, urgency, frequency that is new or different.  He does have some loose stool now and again but he denies any changes recently.  No hematochezia or melena.  No joint pain, redness, swelling.  No tick bites.  No rash or open sores.  No other known sick contacts.      Allergies:  Allergies   Allergen Reactions    Brilinta [Ticagrelor] Other (See Comments) and Difficulty breathing     Per pt and spouse, hyperventilation.    Clonazepam Other (See Comments)     Per spouse, acted like a zombie and he was shaky, could barely talk.     Problem List:    Patient Active Problem List    Diagnosis Date Noted    Hypotension 06/11/2024     Priority: Medium    Ischemic cardiomyopathy 04/15/2024     Priority: Medium     25% EF      Anticoagulated 04/15/2024     Priority: Medium     Due to large akinetic area anterior heart       Left inguinal hernia 01/17/2024     Priority: Medium    Atherosclerosis of native coronary artery of native heart with angina pectoris (H24) 01/02/2024     Priority: Medium     anterior STEMI " s/p PCI to the pLAD on 10/26/23 with a VT/VF arrest the next day (10/27) with patent stents and unchanged anatomy on repeat angiogram       Anxiety 11/13/2023     Priority: Medium    Systolic CHF (H) 11/07/2023     Priority: Medium    Prostate cancer (H) 09/23/2023     Priority: Medium    Hyperlipidemia LDL goal <130 01/26/2016     Priority: Medium      Past Medical History:    Past Medical History:   Diagnosis Date    Benign essential hypertension     CAD (coronary artery disease)     Chronic systolic heart failure (H)     Hypertension     ST elevation myocardial infarction (STEMI), unspecified artery (H)     Ventricular fibrillation (H)      Past Surgical History:    Past Surgical History:   Procedure Laterality Date    COLONOSCOPY N/A 3/23/2023    Procedure: COLONOSCOPY, FLEXIBLE, WITH LESION REMOVAL USING SNARE;  Surgeon: Jose Faustin MD;  Location: [facility]    CV CENTRAL VENOUS CATHETER PLACEMENT N/A 10/26/2023    Procedure: Central Venous Catheter Placement;  Surgeon: Rob Lyles MD;  Location:  [facility]    CV CORONARY ANGIOGRAM N/A 10/27/2023    Procedure: Coronary Angiogram;  Surgeon: Rob Lyles MD;  Location:  [facility]    CV CORONARY ANGIOGRAM N/A 10/26/2023    Procedure: Coronary Angiogram;  Surgeon: Rob Lyles MD;  Location:  [facility]    CV LEFT HEART CATH N/A 10/26/2023    Procedure: Left Heart Catheterization;  Surgeon: Rob Lyles MD;  Location:  [facility]ATH [facility]    CV PCI N/A 10/27/2023    Procedure: Percutaneous Coronary Intervention;  Surgeon: Rob Lyles MD;  Location:  [facility]ATH [facility]    CV PCI STENT DRUG ELUTING N/A 10/26/2023    Procedure: Percutaneous Coronary Intervention Stent;  Surgeon: Rob Lyles MD;  Location:  [facility]ARD[facility] CATH [facility]    CV RIGHT HEART CATH MEASUREMENTS RECORDED N/A 5/6/2024    Procedure: Heart Cath Right Heart Cath;  Surgeon:  "Rob Lyles MD;  Location:  [facility] CARDI[facility] CATH LAB    EP ICD INSERT SINGLE N/A 2024    Procedure: Implantable Cardioverter Defibrillator Device & Lead Implant Dual;  Surgeon: Guero Black MD;  Location:  [facility] CARDI[facility] CATH LAB    INJECTION, HYDROGEL SPACER N/A 2024    Procedure: INJECTION, HYDROGEL SPACER AND FIDUCIAL MARKER PLACEMENT;  Surgeon: Stanislaw Barros MD;  Location: [facility]     Family History:    Family History   Problem Relation Age of Onset    Other Cancer Mother     Obesity Mother     GI problems Father     Uterine Cancer Sister      Social History:  Marital Status:   [2]  Social History     Tobacco Use    Smoking status: Former     Current packs/day: 0.00     Average packs/day: 0.3 packs/day for 30.0 years (7.5 ttl pk-yrs)     Types: Cigarettes     Start date: 10/26/1993     Quit date: 10/26/2023     Years since quittin.8     Passive exposure: Past    Smokeless tobacco: Never    Tobacco comments:     Quit 10/26/2023   Vaping Use    Vaping status: Never Used   Substance Use Topics    Alcohol use: Yes     Comment: 1-2 beers per day    Drug use: No      Medications:    amiodarone (PACERONE) 200 MG tablet  atorvastatin (LIPITOR) 80 MG tablet  bumetanide (BUMEX) 1 MG tablet  cetirizine (ZYRTEC) 10 MG tablet  clopidogrel (PLAVIX) 75 MG tablet  empagliflozin (JARDIANCE) 10 MG TABS tablet  escitalopram (LEXAPRO) 10 MG tablet  fish oil-omega-3 fatty acids 1000 MG capsule  lisinopril (ZESTRIL) 2.5 MG tablet  magnesium oxide (MAG-OX) 400 MG tablet  melatonin 5 MG tablet  metoprolol succinate ER (TOPROL XL) 25 MG 24 hr tablet  multivitamin w/minerals (THERA-VIT-M) tablet  potassium chloride tray ER (KLOR-CON M20) 20 MEQ CR tablet  vitamin C (ASCORBIC ACID) 1000 MG TABS      Review of Systems   All other systems reviewed and are negative.      PE   BP: (!) 89/55  Pulse: 72  Temp: 100.3  F (37.9  C)  Resp: 16  Height: 167.6 cm (5' 6\")  SpO2: 95 %  Physical Exam  Vitals " and nursing note reviewed.   Constitutional:       General: He is not in acute distress.     Comments: Cachectic.  Cooperative, answering questions well.   HENT:      Head: Atraumatic.      Right Ear: External ear normal.      Left Ear: External ear normal.      Nose: Nose normal.      Mouth/Throat:      Mouth: Mucous membranes are dry.      Pharynx: Oropharynx is clear.   Eyes:      General: No scleral icterus.     Extraocular Movements: Extraocular movements intact.      Conjunctiva/sclera: Conjunctivae normal.      Pupils: Pupils are equal, round, and reactive to light.   Cardiovascular:      Rate and Rhythm: Normal rate.   Pulmonary:      Effort: Pulmonary effort is normal. No respiratory distress.   Abdominal:      Palpations: Abdomen is soft.      Tenderness: There is no abdominal tenderness.   Musculoskeletal:         General: No swelling, tenderness or signs of injury. Normal range of motion.      Cervical back: Normal range of motion.      Right lower leg: No edema.      Left lower leg: No edema.   Skin:     General: Skin is warm and dry.   Neurological:      Mental Status: He is alert and oriented to person, place, and time.   Psychiatric:         Behavior: Behavior normal.         ED COURSE and MDM   1320.  Patient with generalized weakness, fatigue, sleepiness, decreased appetite.  The patient's wife tells me that his blood pressure has been in the 80s off-and-on over the past week.  His blood pressure has normalized here but it was low on presentation.  Pulse normal throughout.  EKG pending.  Lab values pending.  Chest x-ray.  Viral swab.    1513.  Right heart catheterization performed in May, 2024.  This showed normal filling pressures.  There was mild pulmonary hypertension.  There was normal right sided cardiac output reported.  This is in bernal contrast to the EF of 25 to 30% on echocardiogram in February, 2024.  Recent cardiac visit from 7/5/2024 reviewed.    1525.  Patient with mostly unremarkable  workup here in the ED.  He does have some DOMENICA, likely related to dehydration.  The patient admits that he has not been drinking fluid over the past few days.  When asked why he did not have a very good answer.  He admits to sleeping most of the day.  He admits to weight loss.  He admits to decreased appetite.  He does report staying active with his computers.  He denies feeling sad or depressed.  Still, I suspect depression may play a role in his presentation today.  Primary care follow-up recommended.  No evidence for heart failure.  He has not been using his Bumex as there has been no shortness of breath, swelling, etc.  I reeducated him on when to use the Bumex.  He is understanding.  His wife is present throughout.  No emergent need for hospitalization.    EKG  (1352)   Interpretation performed by me.  Rate: 69     Rhythm: ns     Axis: nl  Intervals: DE (12-2) 224, QRS (<12) 101, QTc (>5) 457  P wave: nl     QRS complex: poor R wave progression   ST segment / T-wave: J-pt elevation  Conclusion: PVC, first-degree AV block, poor R wave progression.    Electronic medical chart reviewed, including medical problems, medications, medical allergies, social history.  Recent hospitalizations and surgical procedures reviewed.  Recent clinic visits and consultations reviewed.  Recent labs and test results reviewed.  Nursing notes reviewed.    The patient, their parent if applicable, and/or their medical decision maker(s) and I have reviewed all of the available historical information, applicable PMH, physical exam findings, and objective diagnostic data gathered during this ED visit.  We then discussed all work-up options and then together agreed upon the course taken during this visit.  The ultimate disposition and plan was a cooperative decision made between myself and the patient, their parent if applicable, and/or their legal decision maker(s).  The risks and benefits of all decisions made during this visit were  discussed to the best of my abilities given the circumstances, and all parties are understanding of the pertinent ramifications of these decisions.      LABS  Labs Ordered and Resulted from Time of ED Arrival to Time of ED Departure   COMPREHENSIVE METABOLIC PANEL - Abnormal       Result Value    Sodium 134 (*)     Potassium 4.1      Carbon Dioxide (CO2) 26      Anion Gap 8      Urea Nitrogen 29.1 (*)     Creatinine 1.55 (*)     GFR Estimate 47 (*)     Calcium 8.5 (*)     Chloride 100      Glucose 124 (*)     Alkaline Phosphatase 85      AST 51 (*)     ALT 36      Protein Total 6.8      Albumin 3.4 (*)     Bilirubin Total 0.6     ROUTINE UA WITH MICROSCOPIC REFLEX TO CULTURE - Abnormal    Color Urine Yellow      Appearance Urine Slightly Cloudy (*)     Glucose Urine >499 (*)     Bilirubin Urine Negative      Ketones Urine Negative      Specific Gravity Urine 1.027      Blood Urine Moderate (*)     pH Urine 5.0      Protein Albumin Urine 100 (*)     Urobilinogen Urine Normal      Nitrite Urine Negative      Leukocyte Esterase Urine Negative      Bacteria Urine Few (*)     Mucus Urine Present (*)     RBC Urine 12 (*)     WBC Urine 2      Granular Casts Urine 5 (*)    CBC WITH PLATELETS AND DIFFERENTIAL - Abnormal    WBC Count 5.3      RBC Count 3.42 (*)     Hemoglobin 11.1 (*)     Hematocrit 32.3 (*)     MCV 94      MCH 32.5      MCHC 34.4      RDW 14.7      Platelet Count 156      % Neutrophils 83      % Lymphocytes 6      % Monocytes 11      % Eosinophils 0      % Basophils 0      % Immature Granulocytes 0      NRBCs per 100 WBC 0      Absolute Neutrophils 4.4      Absolute Lymphocytes 0.3 (*)     Absolute Monocytes 0.6      Absolute Eosinophils 0.0      Absolute Basophils 0.0      Absolute Immature Granulocytes 0.0      Absolute NRBCs 0.0     LACTIC ACID WHOLE BLOOD - Normal    Lactic Acid 1.3     MAGNESIUM - Normal    Magnesium 2.2     TSH WITH FREE T4 REFLEX - Normal    TSH 1.61     INFLUENZA A/B, RSV, &  SARS-COV2 PCR - Normal    Influenza A PCR Negative      Influenza B PCR Negative      RSV PCR Negative      SARS CoV2 PCR Negative     BLOOD CULTURE       IMAGING  Images reviewed by me.  Radiology report also reviewed.  XR Chest 2 Views   Final Result   IMPRESSION:       Left subclavian approach pacer defibrillator has right atrial appendage and right ventricular leads. The cardiac silhouette remains normal in size. Unchanged aortic atheromatous calcifications.      The lungs are symmetrically inflated and clear. No interstitial or alveolar opacities. No pleural effusion or pneumothorax.          Procedures    Medications   dextrose 5% and 0.9% NaCl infusion (0 mLs Intravenous Stopped 8/24/24 1526)   sodium chloride 0.9% BOLUS 1,000 mL (0 mLs Intravenous Stopped 8/24/24 1402)         IMPRESSION       ICD-10-CM    1. Fatigue, unspecified type  R53.83 Primary Care Referral               Medication List      There are no discharge medications for this visit.                             Rowdy Olvera MD  08/24/24 1524

## 2024-08-24 NOTE — ED TRIAGE NOTES
Pt has been tried for the last few days, gets off balance at times     Triage Assessment (Adult)       Row Name 08/24/24 1234          Triage Assessment    Airway WDL WDL        Respiratory WDL    Respiratory WDL WDL        Peripheral/Neurovascular WDL    Peripheral Neurovascular WDL WDL

## 2024-08-26 ENCOUNTER — HOSPITAL ENCOUNTER (EMERGENCY)
Facility: CLINIC | Age: 74
Discharge: HOME | End: 2024-08-26
Attending: FAMILY MEDICINE | Admitting: FAMILY MEDICINE
Payer: MEDICARE

## 2024-08-26 ENCOUNTER — NURSE TRIAGE (OUTPATIENT)
Dept: CARDIOLOGY | Facility: CLINIC | Age: 74
End: 2024-08-26
Payer: MEDICARE

## 2024-08-26 VITALS
SYSTOLIC BLOOD PRESSURE: 99 MMHG | RESPIRATION RATE: 14 BRPM | TEMPERATURE: 99.1 F | WEIGHT: 142 LBS | BODY MASS INDEX: 22.92 KG/M2 | OXYGEN SATURATION: 100 % | DIASTOLIC BLOOD PRESSURE: 76 MMHG | HEART RATE: 70 BPM

## 2024-08-26 DIAGNOSIS — R53.83 FATIGUE, UNSPECIFIED TYPE: ICD-10-CM

## 2024-08-26 LAB
ALBUMIN SERPL BCG-MCNC: 3.3 G/DL (ref 3.5–5.2)
ALBUMIN UR-MCNC: 100 MG/DL
ALP SERPL-CCNC: 106 U/L (ref 40–150)
ALT SERPL W P-5'-P-CCNC: 62 U/L (ref 0–70)
ANION GAP SERPL CALCULATED.3IONS-SCNC: 8 MMOL/L (ref 7–15)
APPEARANCE UR: ABNORMAL
AST SERPL W P-5'-P-CCNC: 91 U/L (ref 0–45)
BACTERIA #/AREA URNS HPF: ABNORMAL /HPF
BASOPHILS # BLD AUTO: 0 10E3/UL (ref 0–0.2)
BASOPHILS NFR BLD AUTO: 0 %
BILIRUB SERPL-MCNC: 1.1 MG/DL
BILIRUB UR QL STRIP: NEGATIVE
BUN SERPL-MCNC: 26.1 MG/DL (ref 8–23)
CALCIUM SERPL-MCNC: 8.6 MG/DL (ref 8.8–10.4)
CHLORIDE SERPL-SCNC: 104 MMOL/L (ref 98–107)
COLOR UR AUTO: YELLOW
CREAT SERPL-MCNC: 1.36 MG/DL (ref 0.67–1.17)
EGFRCR SERPLBLD CKD-EPI 2021: 55 ML/MIN/1.73M2
EOSINOPHIL # BLD AUTO: 0 10E3/UL (ref 0–0.7)
EOSINOPHIL NFR BLD AUTO: 0 %
ERYTHROCYTE [DISTWIDTH] IN BLOOD BY AUTOMATED COUNT: 15.1 % (ref 10–15)
GLUCOSE SERPL-MCNC: 110 MG/DL (ref 70–99)
GLUCOSE UR STRIP-MCNC: >499 MG/DL
HCO3 SERPL-SCNC: 24 MMOL/L (ref 22–29)
HCT VFR BLD AUTO: 32.7 % (ref 40–53)
HGB BLD-MCNC: 11.2 G/DL (ref 13.3–17.7)
HGB UR QL STRIP: ABNORMAL
HOLD SPECIMEN: NORMAL
IMM GRANULOCYTES # BLD: 0 10E3/UL
IMM GRANULOCYTES NFR BLD: 1 %
KETONES UR STRIP-MCNC: NEGATIVE MG/DL
LACTATE SERPL-SCNC: 0.8 MMOL/L (ref 0.7–2)
LEUKOCYTE ESTERASE UR QL STRIP: NEGATIVE
LYMPHOCYTES # BLD AUTO: 0.4 10E3/UL (ref 0.8–5.3)
LYMPHOCYTES NFR BLD AUTO: 6 %
MCH RBC QN AUTO: 32.1 PG (ref 26.5–33)
MCHC RBC AUTO-ENTMCNC: 34.3 G/DL (ref 31.5–36.5)
MCV RBC AUTO: 94 FL (ref 78–100)
MONOCYTES # BLD AUTO: 0.3 10E3/UL (ref 0–1.3)
MONOCYTES NFR BLD AUTO: 5 %
MUCOUS THREADS #/AREA URNS LPF: PRESENT /LPF
NEUTROPHILS # BLD AUTO: 5.6 10E3/UL (ref 1.6–8.3)
NEUTROPHILS NFR BLD AUTO: 88 %
NITRATE UR QL: NEGATIVE
NRBC # BLD AUTO: 0 10E3/UL
NRBC BLD AUTO-RTO: 0 /100
PH UR STRIP: 5 [PH] (ref 5–7)
PLATELET # BLD AUTO: 165 10E3/UL (ref 150–450)
POTASSIUM SERPL-SCNC: 4.3 MMOL/L (ref 3.4–5.3)
PROT SERPL-MCNC: 6.7 G/DL (ref 6.4–8.3)
RBC # BLD AUTO: 3.49 10E6/UL (ref 4.4–5.9)
RBC URINE: 3 /HPF
SARS-COV-2 RNA RESP QL NAA+PROBE: NEGATIVE
SODIUM SERPL-SCNC: 136 MMOL/L (ref 135–145)
SP GR UR STRIP: 1.02 (ref 1–1.03)
SQUAMOUS EPITHELIAL: 1 /HPF
TROPONIN T SERPL HS-MCNC: 31 NG/L
UROBILINOGEN UR STRIP-MCNC: NORMAL MG/DL
WBC # BLD AUTO: 6.4 10E3/UL (ref 4–11)
WBC URINE: 3 /HPF

## 2024-08-26 PROCEDURE — 258N000003 HC RX IP 258 OP 636: Performed by: FAMILY MEDICINE

## 2024-08-26 PROCEDURE — 87635 SARS-COV-2 COVID-19 AMP PRB: CPT | Performed by: FAMILY MEDICINE

## 2024-08-26 PROCEDURE — 99283 EMERGENCY DEPT VISIT LOW MDM: CPT | Performed by: FAMILY MEDICINE

## 2024-08-26 PROCEDURE — 99284 EMERGENCY DEPT VISIT MOD MDM: CPT | Mod: 25 | Performed by: FAMILY MEDICINE

## 2024-08-26 PROCEDURE — 96361 HYDRATE IV INFUSION ADD-ON: CPT | Performed by: FAMILY MEDICINE

## 2024-08-26 PROCEDURE — 93005 ELECTROCARDIOGRAM TRACING: CPT | Performed by: FAMILY MEDICINE

## 2024-08-26 PROCEDURE — 93010 ELECTROCARDIOGRAM REPORT: CPT | Performed by: FAMILY MEDICINE

## 2024-08-26 PROCEDURE — 96360 HYDRATION IV INFUSION INIT: CPT | Performed by: FAMILY MEDICINE

## 2024-08-26 PROCEDURE — 83605 ASSAY OF LACTIC ACID: CPT | Performed by: FAMILY MEDICINE

## 2024-08-26 PROCEDURE — 84484 ASSAY OF TROPONIN QUANT: CPT | Performed by: FAMILY MEDICINE

## 2024-08-26 PROCEDURE — 85025 COMPLETE CBC W/AUTO DIFF WBC: CPT | Performed by: FAMILY MEDICINE

## 2024-08-26 PROCEDURE — 82040 ASSAY OF SERUM ALBUMIN: CPT | Performed by: FAMILY MEDICINE

## 2024-08-26 PROCEDURE — 81001 URINALYSIS AUTO W/SCOPE: CPT | Performed by: FAMILY MEDICINE

## 2024-08-26 PROCEDURE — 36415 COLL VENOUS BLD VENIPUNCTURE: CPT | Performed by: FAMILY MEDICINE

## 2024-08-26 RX ADMIN — SODIUM CHLORIDE 1000 ML: 9 INJECTION, SOLUTION INTRAVENOUS at 12:00

## 2024-08-26 RX ADMIN — SODIUM CHLORIDE 1000 ML: 9 INJECTION, SOLUTION INTRAVENOUS at 14:52

## 2024-08-26 ASSESSMENT — ACTIVITIES OF DAILY LIVING (ADL)
ADLS_ACUITY_SCORE: 37

## 2024-08-26 ASSESSMENT — COLUMBIA-SUICIDE SEVERITY RATING SCALE - C-SSRS
1. IN THE PAST MONTH, HAVE YOU WISHED YOU WERE DEAD OR WISHED YOU COULD GO TO SLEEP AND NOT WAKE UP?: NO
6. HAVE YOU EVER DONE ANYTHING, STARTED TO DO ANYTHING, OR PREPARED TO DO ANYTHING TO END YOUR LIFE?: NO
2. HAVE YOU ACTUALLY HAD ANY THOUGHTS OF KILLING YOURSELF IN THE PAST MONTH?: NO

## 2024-08-26 NOTE — ED NOTES
Walked patient around ER doing two laps. PT started with a 72 bpm and 99% O2, throughout the walk Heart rate was from 72-81bpm and oxygen stayed about 96%. Pt was not dizzy or off balance.

## 2024-08-26 NOTE — ED PROVIDER NOTES
"     Emergency Department Patient Sign-out     ED Summary and Plan at Sign Out:  Patient is a 74-year-old male presenting with fatigue, poor appetite, weakness.  Per my chart review, the patient has a known history of ischemic cardiomyopathy, EF 25%. He has a typical systolic blood pressure of 90s-low 100s. Known history of prostate cancer and a designation of, \"being cancer free,\" per his wife. No recent alcohol. Taking medications as directed.  Visits with GI workup thus far has been unremarkable compared to recent.  When blood pressure found to be on the low side, 85/60.  Presumably related to hydration.  He has not yet able to provide a urine sample for study.    ED MDM:  1454.  Patient with poor appetite, weakness, fatigue.  Patient thought to have a component of depression during his last visit.  His workup today is similar, relatively unremarkable.  Patient still unable to provide a urine.  He drank a large cup of water.  He had a liter of fluid.  A second liter added and a second cup of water provided.  Regular diet will be ordered.    1701.  Patient has no evidence of fatigue here now.  He is able to get up and ambulate around the department quickly and without assist.  No hypoxemia.  No clinical shortness of breath.  Workup was unremarkable/similar to previous.  No indication for CT head, chest, abdomen, or pelvis.  He denies medication changes.  He denies caffeine changes.  He denies shortness of breath as in the source of symptoms.  He again repeats that his fatigue, that he feels tired and wants to sleep.  He apparently snores at night, not a typical presentation for sleep apnea.  Primary care referral order placed.      IMPRESSION:    ICD-10-CM    1. Fatigue, unspecified type  R53.83 Primary Care Referral               Rowdy Olvera MD  08/26/24 1703    "

## 2024-08-26 NOTE — DISCHARGE INSTRUCTIONS
RETURN TO THE EMERGENCY ROOM FOR THE FOLLOWING:    Severely worsened breathing, fever greater than 101, or at anytime for any concern.    FOLLOW UP:    Primary care referral order placed at the time of discharge, expect a phone call within the next few days to help with scheduling.    TREATMENT RECOMMENDATIONS:    There have been no new medications provided and there are no prescription medication changes recommended.     NURSE ADVICE LINE:  [phone] or [phone]

## 2024-08-26 NOTE — ED PROVIDER NOTES
History     Chief Complaint   Patient presents with    Fatigue     HPI  Duane C Johnson is a 74 year old male, past medical history significant for ischemic cardiomyopathy, ASCVD, anxiety, systolic congestive heart failure, prostate cancer, hyperlipidemia, benign essential tremor, hypertension, presents to the emergency department by EMS with concerns of fatigue, poor appetite, lethargy.  History is obtained from the patient who I note upon reviewing the EHR was here 2 days ago with similar concerns.  The patient did not remember the visit.  He identifies 1 or 2 weeks perhaps longer of fatigue, no appetite, poor intake for fluids and solids.  Apparently his blood pressure has been a little bit lower than usual at home he typically runs in the  range systolic with his ischemic cardiomyopathy and 25% EF.  He notes no fever chills or sweats.  No cough.  He denies any chest pain or abdominal pain no nausea or vomiting.  No change in bowel habit and his last bowel movement was this morning.  He notes his urine appears more concentrated than usual but he denies any urinary frequency, urgency or dysuria.      Allergies:  Allergies   Allergen Reactions    Brilinta [Ticagrelor] Other (See Comments) and Difficulty breathing     Per pt and spouse, hyperventilation.    Clonazepam Other (See Comments)     Per spouse, acted like a zombie and he was shaky, could barely talk.       Problem List:    Patient Active Problem List    Diagnosis Date Noted    Hypotension 06/11/2024     Priority: Medium    Ischemic cardiomyopathy 04/15/2024     Priority: Medium     25% EF      Anticoagulated 04/15/2024     Priority: Medium     Due to large akinetic area anterior heart       Left inguinal hernia 01/17/2024     Priority: Medium    Atherosclerosis of native coronary artery of native heart with angina pectoris (H24) 01/02/2024     Priority: Medium     anterior STEMI s/p PCI to the pLAD on 10/26/23 with a VT/VF arrest the next day  (10/27) with patent stents and unchanged anatomy on repeat angiogram       Anxiety 11/13/2023     Priority: Medium    Systolic CHF (H) 11/07/2023     Priority: Medium    Prostate cancer (H) 09/23/2023     Priority: Medium    Hyperlipidemia LDL goal <130 01/26/2016     Priority: Medium        Past Medical History:    Past Medical History:   Diagnosis Date    Benign essential hypertension     CAD (coronary artery disease)     Chronic systolic heart failure (H)     Hypertension     ST elevation myocardial infarction (STEMI), unspecified artery (H)     Ventricular fibrillation (H)        Past Surgical History:    Past Surgical History:   Procedure Laterality Date    COLONOSCOPY N/A 3/23/2023    Procedure: COLONOSCOPY, FLEXIBLE, WITH LESION REMOVAL USING SNARE;  Surgeon: Jose Faustin MD;  Location: [facility]    CV CENTRAL VENOUS CATHETER PLACEMENT N/A 10/26/2023    Procedure: Central Venous Catheter Placement;  Surgeon: Rob Lyles MD;  Location: [facility] CARDIAC CATH LAB    CV CORONARY ANGIOGRAM N/A 10/27/2023    Procedure: Coronary Angiogram;  Surgeon: Rob Lyles MD;  Location: [facility] CARDIAC CATH LAB    CV CORONARY ANGIOGRAM N/A 10/26/2023    Procedure: Coronary Angiogram;  Surgeon: Rob Lyles MD;  Location: [facility] CARDIAC CATH LAB    CV LEFT HEART CATH N/A 10/26/2023    Procedure: Left Heart Catheterization;  Surgeon: Rob Lyles MD;  Location: [facility] CARDIAC CATH LAB    CV PCI N/A 10/27/2023    Procedure: Percutaneous Coronary Intervention;  Surgeon: Rob Lyles MD;  Location:  HEART CARDIAC CATH LAB    CV PCI STENT DRUG ELUTING N/A 10/26/2023    Procedure: Percutaneous Coronary Intervention Stent;  Surgeon: Rob Lyles MD;  Location: [facility] CARDIAC CATH LAB    CV RIGHT HEART CATH MEASUREMENTS RECORDED N/A 5/6/2024    Procedure: Heart Cath Right Heart Cath;  Surgeon: Rob Lyles MD;  Location: [facility] CARDIAC CATH LAB     EP ICD INSERT SINGLE N/A 2024    Procedure: Implantable Cardioverter Defibrillator Device & Lead Implant Dual;  Surgeon: Guero Black MD;  Location:  HEART CARDIAC CATH LAB    INJECTION, HYDROGEL SPACER N/A 2024    Procedure: INJECTION, HYDROGEL SPACER AND FIDUCIAL MARKER PLACEMENT;  Surgeon: Stanislaw Barros MD;  Location: [facility]       Family History:    Family History   Problem Relation Age of Onset    Other Cancer Mother     Obesity Mother     GI problems Father     Uterine Cancer Sister        Social History:  Marital Status:   [2]  Social History     Tobacco Use    Smoking status: Former     Current packs/day: 0.00     Average packs/day: 0.3 packs/day for 30.0 years (7.5 ttl pk-yrs)     Types: Cigarettes     Start date: 10/26/1993     Quit date: 10/26/2023     Years since quittin.8     Passive exposure: Past    Smokeless tobacco: Never    Tobacco comments:     Quit 10/26/2023   Vaping Use    Vaping status: Never Used   Substance Use Topics    Alcohol use: Yes     Comment: 1-2 beers per day    Drug use: No        Medications:    amiodarone (PACERONE) 200 MG tablet  atorvastatin (LIPITOR) 80 MG tablet  cetirizine (ZYRTEC) 10 MG tablet  clopidogrel (PLAVIX) 75 MG tablet  empagliflozin (JARDIANCE) 10 MG TABS tablet  escitalopram (LEXAPRO) 10 MG tablet  fish oil-omega-3 fatty acids 1000 MG capsule  ibuprofen (ADVIL/MOTRIN) 400 MG tablet  lisinopril (ZESTRIL) 2.5 MG tablet  magnesium oxide (MAG-OX) 400 MG tablet  melatonin 5 MG tablet  metoprolol succinate ER (TOPROL XL) 25 MG 24 hr tablet  multivitamin w/minerals (THERA-VIT-M) tablet  potassium chloride tray ER (KLOR-CON M20) 20 MEQ CR tablet  vitamin C (ASCORBIC ACID) 1000 MG TABS          Review of Systems   All other systems reviewed and are negative.      Physical Exam   BP: 101/62  Pulse: 74  Temp: 99.1  F (37.3  C)  Resp: 14  Weight: 64.4 kg (142 lb)  SpO2: 96 %      Physical Exam  Vitals and nursing note reviewed.    Constitutional:       General: He is not in acute distress.     Appearance: Normal appearance. He is not ill-appearing.   HENT:      Head: Normocephalic and atraumatic.      Right Ear: Tympanic membrane, ear canal and external ear normal.      Left Ear: Tympanic membrane, ear canal and external ear normal.      Nose: Nose normal.      Mouth/Throat:      Mouth: Mucous membranes are dry.      Pharynx: Oropharynx is clear.   Eyes:      Extraocular Movements: Extraocular movements intact.      Conjunctiva/sclera: Conjunctivae normal.      Pupils: Pupils are equal, round, and reactive to light.   Cardiovascular:      Rate and Rhythm: Normal rate and regular rhythm.      Pulses: Normal pulses.      Heart sounds: Normal heart sounds.   Pulmonary:      Effort: Pulmonary effort is normal.      Breath sounds: Normal breath sounds.   Abdominal:      General: Bowel sounds are normal.      Palpations: Abdomen is soft.   Musculoskeletal:         General: Normal range of motion.      Cervical back: Normal range of motion and neck supple.   Skin:     General: Skin is warm and dry.      Capillary Refill: Capillary refill takes less than 2 seconds.   Neurological:      General: No focal deficit present.      Mental Status: He is alert and oriented to person, place, and time.         ED Course        Procedures              EKG Interpretation:      Interpreted by Chava Emmanuel MD  Time reviewed: Time obtained 1235 time interpreted same 70 bpm sinus rhythm nonspecific T wave abnormality, no definite acute ischemia or infarct.  Poor R wave progression.         No results found for this or any previous visit (from the past 24 hour(s)).  2:24 PM  Patient was discussed at shift change with Dr. Rowdy Cervantes with diagnostic evaluation pending.  Please see Dr. Cervantes's note for disposition.    Medications   sodium chloride 0.9% BOLUS 1,000 mL (0 mLs Intravenous Stopped 8/26/24 1329)   sodium chloride 0.9% BOLUS 1,000 mL (0 mLs  Intravenous Stopped 8/26/24 1545)       Assessments & Plan (with Medical Decision Making)     I have reviewed the nursing notes.    I have reviewed the findings, diagnosis, plan and need for follow up with the patient.        Discharge Medication List as of 8/26/2024  5:02 PM          Final diagnoses:   Fatigue, unspecified type       8/26/2024   [facility] EMERGENCY DEPT       Chava Emmanuel MD  08/30/24 1542

## 2024-08-26 NOTE — ED TRIAGE NOTES
"Feeling \"lethargic\" for last week, poor appetite and not drinking enough fluids, denies any illness recently, urine is dark and has some urgency but only small amounts     Triage Assessment (Adult)       Row Name 08/26/24 1100          Triage Assessment    Airway WDL WDL        Respiratory WDL    Respiratory WDL WDL        Peripheral/Neurovascular WDL    Peripheral Neurovascular WDL WDL        Cognitive/Neuro/Behavioral WDL    Cognitive/Neuro/Behavioral WDL WDL                     "

## 2024-08-26 NOTE — TELEPHONE ENCOUNTER
"Received a call from the [facility] to discuss  husbands inability to get out of bed concern.    [facility] ED visit 8/24/24 for fatigue. Please review notes.    HX: ICD, chronic systolic CHF, HFrEF, CAD, STEMI s/p PCI to LAD, V tach/V fib arrest    Spoke to wife Melissa, who says Duane has been lethargic and weak since last week. He was seen in the ED on 8/24/24 and \"didn't find anything\".  She says this morning he is \"not able to get out of bed\", and if he is able to get out of bed, he barely can make it to the bathroom because he is so shaky.  Advised it is recommended to call 911.  She verbalized agreement and understanding.  Will send a message to [place] [facility] for an update.    1. DESCRIPTION: \"Describe how you are feeling.\" weak  2. SEVERITY: \"How bad is it?\" \"Can you stand and walk?\" Moderate to severe  - MILD (0-3): Feels weak or tired, but does not interfere with work, school or normal activities.  - MODERATE (4-7): Able to stand and walk; weakness interferes with work, school, or normal activities.  - SEVERE (8-10): Unable to stand or walk; unable to do usual activities.  3. ONSET: \"When did these symptoms begin?\" (e.g., hours, days, weeks, months) since last week  4. CAUSE: \"What do you think is causing the weakness or fatigue?\" (e.g., not drinking enough fluids, medical problem, trouble sleeping)  5. NEW MEDICINES: \"Have you started on any new medicines recently?\" (e.g., opioid pain medicines, benzodiazepines, muscle relaxants, antidepressants, antihistamines, neuroleptics, beta blockers)  6. OTHER SYMPTOMS: \"Do you have any other symptoms?\" (e.g., chest pain, fever, cough, SOB, vomiting, diarrhea, bleeding, other areas of pain) lethargic  7. PREGNANCY: \"Is there any chance you are pregnant?\" \"When was your last menstrual period?\"  Reason for Disposition   SEVERE weakness (i.e., unable to walk or barely able to walk, requires support) and new-onset or worsening    Protocols used: Weakness " (Generalized) and Fatigue-A-OH

## 2024-08-27 ENCOUNTER — PATIENT OUTREACH (OUTPATIENT)
Dept: CARE COORDINATION | Facility: CLINIC | Age: 74
End: 2024-08-27
Payer: MEDICARE

## 2024-08-27 ENCOUNTER — PATIENT OUTREACH (OUTPATIENT)
Dept: CARDIOLOGY | Facility: CLINIC | Age: 74
End: 2024-08-27
Payer: MEDICARE

## 2024-08-27 ENCOUNTER — MYC MEDICAL ADVICE (OUTPATIENT)
Dept: CARDIOLOGY | Facility: CLINIC | Age: 74
End: 2024-08-27
Payer: MEDICARE

## 2024-08-27 DIAGNOSIS — I50.20 HEART FAILURE WITH REDUCED EJECTION FRACTION, NYHA CLASS I (H): Primary | ICD-10-CM

## 2024-08-27 NOTE — PROGRESS NOTES
Called Duane to follow up. Seen twice in ER in last 3 days for increased fatigue.   At first ER visit 8/24, wife reports BP has been on and off n the 80s over last week, had not been drinking much fluds and sleeping most of day.    BP at second visit is 85/60 (has historically run low). Given some IV fluids and referred to primary care.   Last seen in heart failure clinic 7/5 and started Jardiance.     Spoke with Duane. He's feeling better this morning. He's had a lot of fatigue and lethargy the last few weeks and doesn't want to do anything. That's unusual for him. Melissa confirms his blood pressure has been high 80s the last few weeks which is a little lower than it had been.    He thought he was drinking enough fluids. He tells me he has a measured amount that he is supposed to drink. I asked him what that amount is and he told me he drinks 1500 ml daily (wife wanted us to know Mountain Dew is maybe a third of it).   I reminded him that when we started the Jardiance, we advised to increase his fluid intake to around 70 oz, which is about 2100 ml.     I asked him to really try and focus on increasing fluid. Told him specifically to add 1 cup water in the AM and 1 cup in the afternoon. I will call him next week to make sure he's doing this and feeling better.   He does confirm he is not taking any Bumex currently. Is ordered as PRN.   Advised to call us in the meantime if symptoms worsen again.   Has follow up 9/16 with provider.   Will update provider.     Reviewed with provider, confirmed patient not taking any bumex and will remove from med list. Updated patient via [employer].

## 2024-08-27 NOTE — PROGRESS NOTES
[facility] Care [facility]  Community Health Worker Initial Outreach    Background: Primary Care - Care Coordination program identified per system criteria based on ED discharge report and reviewed for possible outreach.    CHW will not proceed with patient outreach due to the following reason:    Cardiology has contacted patient for post ED care coordination. Please see Clinic Care Coordination - Post Hospital encounter on 08/27/2024.    Plan: Primary Care - Care Coordination episode addressed appropriately per reason noted above.        Nellie Mariscal  Community Health Worker  [facility] Care Resource [place], [facility]    *Connected Care Resource Team does NOT follow patient ongoing. Referrals are identified based on internal discharge reports and the outreach is to ensure patient has an understanding of their discharge instructions.

## 2024-08-28 ENCOUNTER — TELEPHONE (OUTPATIENT)
Dept: FAMILY MEDICINE | Facility: CLINIC | Age: 74
End: 2024-08-28
Payer: MEDICARE

## 2024-08-28 NOTE — TELEPHONE ENCOUNTER
Reason for Call:  Appointment Request    Patient requesting this type of appt:  Hospital/ED Follow-Up     Requested provider: Jose Coles    Reason patient unable to be scheduled: Not within requested timeframe    When does patient want to be seen/preferred time: 3-7 days    Comments: Need a hospital follow up appointment, was seen at [facility] ED 8/24/24 & 8/26/24. heart issues. Appointments are too far out.  Was told by ED to schedule as soon as possible. Would like PCP.    Could we send this information to you in [facility]Chart or would you prefer to receive a phone call?:   Patient would prefer a phone call and a [facility]Chart.   Okay to leave a detailed message?: Yes at Cell number on file:    Telephone Information:   Mobile [phone]       Call taken on 8/28/2024 at 5:33 PM by Audrey Morgan

## 2024-08-29 ENCOUNTER — VIRTUAL VISIT (OUTPATIENT)
Dept: FAMILY MEDICINE | Facility: CLINIC | Age: 74
End: 2024-08-29
Payer: MEDICARE

## 2024-08-29 ENCOUNTER — LAB (OUTPATIENT)
Dept: LAB | Facility: CLINIC | Age: 74
End: 2024-08-29
Payer: MEDICARE

## 2024-08-29 ENCOUNTER — TELEPHONE (OUTPATIENT)
Dept: CARDIOLOGY | Facility: CLINIC | Age: 74
End: 2024-08-29

## 2024-08-29 DIAGNOSIS — I50.20 HEART FAILURE WITH REDUCED EJECTION FRACTION, NYHA CLASS I (H): ICD-10-CM

## 2024-08-29 DIAGNOSIS — I50.22 CHRONIC SYSTOLIC CONGESTIVE HEART FAILURE (H): ICD-10-CM

## 2024-08-29 DIAGNOSIS — Z79.01 ANTICOAGULATED: ICD-10-CM

## 2024-08-29 DIAGNOSIS — R53.83 FATIGUE, UNSPECIFIED TYPE: Primary | ICD-10-CM

## 2024-08-29 LAB
ALBUMIN SERPL BCG-MCNC: 3.1 G/DL (ref 3.5–5.2)
ALP SERPL-CCNC: 120 U/L (ref 40–150)
ALT SERPL W P-5'-P-CCNC: 42 U/L (ref 0–70)
ANION GAP SERPL CALCULATED.3IONS-SCNC: 7 MMOL/L (ref 7–15)
AST SERPL W P-5'-P-CCNC: 56 U/L (ref 0–45)
BACTERIA BLD CULT: NO GROWTH
BILIRUB SERPL-MCNC: 0.4 MG/DL
BUN SERPL-MCNC: 21.7 MG/DL (ref 8–23)
CALCIUM SERPL-MCNC: 8.5 MG/DL (ref 8.8–10.4)
CHLORIDE SERPL-SCNC: 104 MMOL/L (ref 98–107)
CREAT SERPL-MCNC: 1.21 MG/DL (ref 0.67–1.17)
EGFRCR SERPLBLD CKD-EPI 2021: 63 ML/MIN/1.73M2
GLUCOSE SERPL-MCNC: 105 MG/DL (ref 70–99)
HCO3 SERPL-SCNC: 24 MMOL/L (ref 22–29)
NT-PROBNP SERPL-MCNC: ABNORMAL PG/ML (ref 0–900)
POTASSIUM SERPL-SCNC: 4.3 MMOL/L (ref 3.4–5.3)
PROT SERPL-MCNC: 6.4 G/DL (ref 6.4–8.3)
SODIUM SERPL-SCNC: 135 MMOL/L (ref 135–145)

## 2024-08-29 PROCEDURE — 36415 COLL VENOUS BLD VENIPUNCTURE: CPT

## 2024-08-29 PROCEDURE — 80053 COMPREHEN METABOLIC PANEL: CPT

## 2024-08-29 PROCEDURE — 99213 OFFICE O/P EST LOW 20 MIN: CPT | Mod: 95 | Performed by: PHYSICIAN ASSISTANT

## 2024-08-29 PROCEDURE — 83880 ASSAY OF NATRIURETIC PEPTIDE: CPT

## 2024-08-29 NOTE — TELEPHONE ENCOUNTER
8/29/2024 3:48PM Eve Maguire  Patient confirmed scheduled appointment:  Date: 9/30/2024  Time: 2PM  Visit type: Echo  Provider: Keon Cruz  Location: [address], [place] [address]  Testing/imaging: NA  Additional notes: 8/29 Scheduled ECHO 9/30 in [facility]. JEAN PIERRE Maguire 8/29/2024 3:48PM

## 2024-08-29 NOTE — TELEPHONE ENCOUNTER
Date: 8/29/2024    Time of Call: 3:19 PM     Diagnosis:  HF      [ TORB ] Ordering provider: Dr. Cruz   Order:   -CMP, BNP  -Echo  -Follow-up with PCP regarding UA results   -Hold Jardiance until pt speaks to PCP about UA results      Order received by: Eduarda Jeronimo RN       Follow-up/additional notes: Called and talked to pt and his wife Melissa; relayed recs. Pt will hold Jardiance and reach out to PCP now to review UA and make recommendations. Pt will go today to alk in lab at [facility] to have labs drawn. Message sent to schedulers to call pt to schedule Echo next available.

## 2024-08-29 NOTE — TELEPHONE ENCOUNTER
Scheduled      Sep 11, 2024 9:00 AM  (Arrive by 8:40 AM)  Pre-Operative Physical with Jose Coles MD  [facility] ([facility] ) [address] [place] [id number] [phone]

## 2024-08-29 NOTE — TELEPHONE ENCOUNTER
----- Message from Eduarda BENITEZ sent at 8/29/2024  3:29 PM CDT -----  Hello-    Can you please call pt wife Sasha today and get pt scheduled for Echo next available?     Thanks,   Eduarda

## 2024-08-29 NOTE — PROGRESS NOTES
Duane is a 74 year old who is being evaluated via a billable video visit.    How would you like to obtain your AVS? MyChart  If the video visit is dropped, the invitation should be resent by: Text to cell phone: [phone]  Will anyone else be joining your video visit? No      Assessment & Plan     Fatigue, unspecified type  Very well could be multifactorial.  Seemed quite dry on ER visit 8/24 and hydration has been helping.  Wife was concerned about URINARY TRACT INFECTION and I do not think the case with neg leuk esterase, min WBC.      Chronic systolic congestive heart failure (H)  Getting bnp, comp today from cardio along with echo tomorrow    Anticoagulated          MED REC REQUIRED  Post Medication Reconciliation Status:  Patient was not discharged from an inpatient facility or TCU        Subjective   Duane is a 74 year old, presenting for the following health issues:  Results (Review lab results - particularly urine tests) and Emergency Department Follow Up        4/15/2024    10:36 AM   Additional Questions   Roomed by Dee Dee EPPS   Accompanied by self       Video Start Time: 4:25 PM    HPI     ED/UC Followup:    Facility:  [facility] Emergency Dept   Date of visit: 8/26/2024  Reason for visit: Fatigue  Current Status: Today he is feeling improved. Achiness continues.     Duane was not present for visit, as he was off getting labs done that cardiology ordered today.  Wife admits that he is actually quite a bit better today, better than he has been in about 2 weeks.  Mind more clear, more steady and more energy.  They have been increasing fluids.  They did stop jardiance on recommendation from cardio.      Review of Systems  Constitutional, HEENT, cardiovascular, pulmonary, gi and gu systems are negative, except as otherwise noted.      Objective           Vitals:  No vitals were obtained today due to virtual visit.    Physical Exam   No exam, patient not present          Video-Visit  Details    Type of service:  Video Visit   Video End Time:4:49 PM  Originating Location (pt. Location): Home    Distant Location (provider location):  On-site  Platform used for Video Visit: Ynes  Signed Electronically by: Juan C Wakefield PA-C

## 2024-08-30 ENCOUNTER — ANCILLARY PROCEDURE (OUTPATIENT)
Dept: CARDIOLOGY | Facility: CLINIC | Age: 74
DRG: 001 | End: 2024-08-30
Attending: INTERNAL MEDICINE
Payer: MEDICARE

## 2024-08-30 ENCOUNTER — APPOINTMENT (OUTPATIENT)
Dept: GENERAL RADIOLOGY | Facility: CLINIC | Age: 74
DRG: 001 | End: 2024-08-30
Attending: INTERNAL MEDICINE
Payer: MEDICARE

## 2024-08-30 ENCOUNTER — HOSPITAL ENCOUNTER (INPATIENT)
Facility: CLINIC | Age: 74
LOS: 36 days | Discharge: NURSING, REHAB OR LONG-TERM CARE | DRG: 001 | End: 2024-10-05
Attending: INTERNAL MEDICINE | Admitting: INTERNAL MEDICINE
Payer: MEDICARE

## 2024-08-30 DIAGNOSIS — I50.9 ACUTE EXACERBATION OF CHF (CONGESTIVE HEART FAILURE) (H): Primary | ICD-10-CM

## 2024-08-30 DIAGNOSIS — Z95.810 ICD (IMPLANTABLE CARDIOVERTER-DEFIBRILLATOR) IN PLACE: ICD-10-CM

## 2024-08-30 DIAGNOSIS — Z85.46 PERSONAL HISTORY OF MALIGNANT NEOPLASM OF PROSTATE: ICD-10-CM

## 2024-08-30 DIAGNOSIS — I25.5 ISCHEMIC CARDIOMYOPATHY: ICD-10-CM

## 2024-08-30 DIAGNOSIS — Z87.891 HISTORY OF TOBACCO USE: ICD-10-CM

## 2024-08-30 DIAGNOSIS — I50.23 ACUTE ON CHRONIC SYSTOLIC CONGESTIVE HEART FAILURE (H): ICD-10-CM

## 2024-08-30 DIAGNOSIS — I50.22 CHRONIC SYSTOLIC CONGESTIVE HEART FAILURE (H): ICD-10-CM

## 2024-08-30 DIAGNOSIS — I46.9 CARDIAC ARREST (H): ICD-10-CM

## 2024-08-30 DIAGNOSIS — I50.20 HEART FAILURE WITH REDUCED EJECTION FRACTION, NYHA CLASS I (H): ICD-10-CM

## 2024-08-30 DIAGNOSIS — Z95.811 LVAD (LEFT VENTRICULAR ASSIST DEVICE) PRESENT (H): ICD-10-CM

## 2024-08-30 DIAGNOSIS — Z95.810 AICD (AUTOMATIC CARDIOVERTER/DEFIBRILLATOR) PRESENT: ICD-10-CM

## 2024-08-30 DIAGNOSIS — J90 PLEURAL EFFUSION: ICD-10-CM

## 2024-08-30 DIAGNOSIS — I25.5 ISCHEMIC CARDIOMYOPATHY: Primary | ICD-10-CM

## 2024-08-30 DIAGNOSIS — I48.91 ATRIAL FIBRILLATION, UNSPECIFIED TYPE (H): ICD-10-CM

## 2024-08-30 LAB
ALBUMIN MFR UR ELPH: <6 MG/DL
ALBUMIN SERPL BCG-MCNC: 3.2 G/DL (ref 3.5–5.2)
ALP SERPL-CCNC: 113 U/L (ref 40–150)
ALT SERPL W P-5'-P-CCNC: 39 U/L (ref 0–70)
ANION GAP SERPL CALCULATED.3IONS-SCNC: 8 MMOL/L (ref 7–15)
AST SERPL W P-5'-P-CCNC: 43 U/L (ref 0–45)
ATRIAL RATE - MUSE: 267 BPM
BASOPHILS # BLD AUTO: 0 10E3/UL (ref 0–0.2)
BASOPHILS NFR BLD AUTO: 0 %
BILIRUB SERPL-MCNC: 0.4 MG/DL
BUN SERPL-MCNC: 21.9 MG/DL (ref 8–23)
CALCIUM SERPL-MCNC: 8.7 MG/DL (ref 8.8–10.4)
CHLORIDE SERPL-SCNC: 104 MMOL/L (ref 98–107)
CREAT SERPL-MCNC: 1.28 MG/DL (ref 0.67–1.17)
CREAT UR-MCNC: 6.7 MG/DL
DIASTOLIC BLOOD PRESSURE - MUSE: NORMAL MMHG
EGFRCR SERPLBLD CKD-EPI 2021: 59 ML/MIN/1.73M2
EOSINOPHIL # BLD AUTO: 0.1 10E3/UL (ref 0–0.7)
EOSINOPHIL NFR BLD AUTO: 1 %
ERYTHROCYTE [DISTWIDTH] IN BLOOD BY AUTOMATED COUNT: 15.8 % (ref 10–15)
GLUCOSE SERPL-MCNC: 100 MG/DL (ref 70–99)
HCO3 SERPL-SCNC: 24 MMOL/L (ref 22–29)
HCT VFR BLD AUTO: 31.5 % (ref 40–53)
HGB BLD-MCNC: 10.2 G/DL (ref 13.3–17.7)
IMM GRANULOCYTES # BLD: 0.1 10E3/UL
IMM GRANULOCYTES NFR BLD: 1 %
INR PPP: 1.24 (ref 0.85–1.15)
INTERPRETATION ECG - MUSE: NORMAL
LYMPHOCYTES # BLD AUTO: 0.6 10E3/UL (ref 0.8–5.3)
LYMPHOCYTES NFR BLD AUTO: 6 %
MAGNESIUM SERPL-MCNC: 2.2 MG/DL (ref 1.7–2.3)
MCH RBC QN AUTO: 31.3 PG (ref 26.5–33)
MCHC RBC AUTO-ENTMCNC: 32.4 G/DL (ref 31.5–36.5)
MCV RBC AUTO: 97 FL (ref 78–100)
MONOCYTES # BLD AUTO: 0.2 10E3/UL (ref 0–1.3)
MONOCYTES NFR BLD AUTO: 2 %
NEUTROPHILS # BLD AUTO: 8.8 10E3/UL (ref 1.6–8.3)
NEUTROPHILS NFR BLD AUTO: 90 %
NRBC # BLD AUTO: 0 10E3/UL
NRBC BLD AUTO-RTO: 0 /100
NT-PROBNP SERPL-MCNC: ABNORMAL PG/ML (ref 0–900)
P AXIS - MUSE: NORMAL DEGREES
PLATELET # BLD AUTO: 256 10E3/UL (ref 150–450)
POTASSIUM SERPL-SCNC: 4.3 MMOL/L (ref 3.4–5.3)
PR INTERVAL - MUSE: NORMAL MS
PROT SERPL-MCNC: 6.6 G/DL (ref 6.4–8.3)
PROT/CREAT 24H UR: NORMAL MG/G{CREAT}
QRS DURATION - MUSE: 96 MS
QT - MUSE: 396 MS
QTC - MUSE: 481 MS
R AXIS - MUSE: -26 DEGREES
RBC # BLD AUTO: 3.26 10E6/UL (ref 4.4–5.9)
SODIUM SERPL-SCNC: 136 MMOL/L (ref 135–145)
SYSTOLIC BLOOD PRESSURE - MUSE: NORMAL MMHG
T AXIS - MUSE: 173 DEGREES
TROPONIN T SERPL HS-MCNC: 24 NG/L
URATE SERPL-MCNC: 2.2 MG/DL (ref 3.4–7)
VENTRICULAR RATE- MUSE: 89 BPM
WBC # BLD AUTO: 9.8 10E3/UL (ref 4–11)

## 2024-08-30 PROCEDURE — 84156 ASSAY OF PROTEIN URINE: CPT

## 2024-08-30 PROCEDURE — 93283 PRGRMG EVAL IMPLANTABLE DFB: CPT

## 2024-08-30 PROCEDURE — 250N000011 HC RX IP 250 OP 636

## 2024-08-30 PROCEDURE — 93010 ELECTROCARDIOGRAM REPORT: CPT | Performed by: INTERNAL MEDICINE

## 2024-08-30 PROCEDURE — 99285 EMERGENCY DEPT VISIT HI MDM: CPT | Mod: 25 | Performed by: INTERNAL MEDICINE

## 2024-08-30 PROCEDURE — 120N000005 HC R&B MS OVERFLOW UMMC

## 2024-08-30 PROCEDURE — 83735 ASSAY OF MAGNESIUM: CPT | Performed by: INTERNAL MEDICINE

## 2024-08-30 PROCEDURE — 84550 ASSAY OF BLOOD/URIC ACID: CPT

## 2024-08-30 PROCEDURE — 83880 ASSAY OF NATRIURETIC PEPTIDE: CPT | Performed by: INTERNAL MEDICINE

## 2024-08-30 PROCEDURE — 71046 X-RAY EXAM CHEST 2 VIEWS: CPT | Mod: 26 | Performed by: RADIOLOGY

## 2024-08-30 PROCEDURE — 93283 PRGRMG EVAL IMPLANTABLE DFB: CPT | Mod: 26 | Performed by: INTERNAL MEDICINE

## 2024-08-30 PROCEDURE — 82040 ASSAY OF SERUM ALBUMIN: CPT | Performed by: INTERNAL MEDICINE

## 2024-08-30 PROCEDURE — 36415 COLL VENOUS BLD VENIPUNCTURE: CPT | Performed by: INTERNAL MEDICINE

## 2024-08-30 PROCEDURE — 84484 ASSAY OF TROPONIN QUANT: CPT | Performed by: INTERNAL MEDICINE

## 2024-08-30 PROCEDURE — 99285 EMERGENCY DEPT VISIT HI MDM: CPT | Performed by: INTERNAL MEDICINE

## 2024-08-30 PROCEDURE — 250N000013 HC RX MED GY IP 250 OP 250 PS 637

## 2024-08-30 PROCEDURE — 85025 COMPLETE CBC W/AUTO DIFF WBC: CPT | Performed by: INTERNAL MEDICINE

## 2024-08-30 PROCEDURE — 71046 X-RAY EXAM CHEST 2 VIEWS: CPT

## 2024-08-30 PROCEDURE — 85610 PROTHROMBIN TIME: CPT | Performed by: INTERNAL MEDICINE

## 2024-08-30 PROCEDURE — 93005 ELECTROCARDIOGRAM TRACING: CPT | Performed by: INTERNAL MEDICINE

## 2024-08-30 PROCEDURE — 99223 1ST HOSP IP/OBS HIGH 75: CPT | Mod: 25 | Performed by: INTERNAL MEDICINE

## 2024-08-30 PROCEDURE — 93295 DEV INTERROG REMOTE 1/2/MLT: CPT | Performed by: INTERNAL MEDICINE

## 2024-08-30 PROCEDURE — 93296 REM INTERROG EVL PM/IDS: CPT

## 2024-08-30 RX ORDER — POTASSIUM CHLORIDE 750 MG/1
20 TABLET, EXTENDED RELEASE ORAL DAILY
Status: DISCONTINUED | OUTPATIENT
Start: 2024-08-30 | End: 2024-09-13

## 2024-08-30 RX ORDER — MAGNESIUM OXIDE 400 MG/1
400 TABLET ORAL DAILY
Status: DISCONTINUED | OUTPATIENT
Start: 2024-08-31 | End: 2024-09-18

## 2024-08-30 RX ORDER — IBUPROFEN 400 MG/1
400 TABLET, FILM COATED ORAL EVERY 6 HOURS PRN
Status: ON HOLD | COMMUNITY
End: 2024-09-06

## 2024-08-30 RX ORDER — CETIRIZINE HYDROCHLORIDE 10 MG/1
10 TABLET ORAL DAILY PRN
Status: DISCONTINUED | OUTPATIENT
Start: 2024-08-30 | End: 2024-09-18

## 2024-08-30 RX ORDER — FUROSEMIDE 10 MG/ML
40 INJECTION INTRAMUSCULAR; INTRAVENOUS ONCE
Status: COMPLETED | OUTPATIENT
Start: 2024-08-30 | End: 2024-08-30

## 2024-08-30 RX ORDER — ATORVASTATIN CALCIUM 80 MG/1
80 TABLET, FILM COATED ORAL DAILY
Status: DISCONTINUED | OUTPATIENT
Start: 2024-08-31 | End: 2024-09-18

## 2024-08-30 RX ORDER — CLOPIDOGREL BISULFATE 75 MG/1
75 TABLET ORAL DAILY
Status: DISCONTINUED | OUTPATIENT
Start: 2024-08-31 | End: 2024-09-11

## 2024-08-30 RX ORDER — MULTIVIT WITH MINERALS/LUTEIN
1000 TABLET ORAL DAILY
Status: DISCONTINUED | OUTPATIENT
Start: 2024-08-31 | End: 2024-09-18

## 2024-08-30 RX ORDER — ESCITALOPRAM OXALATE 10 MG/1
10 TABLET ORAL DAILY
Status: DISCONTINUED | OUTPATIENT
Start: 2024-08-31 | End: 2024-09-18

## 2024-08-30 RX ORDER — LISINOPRIL 2.5 MG/1
2.5 TABLET ORAL DAILY
Status: DISCONTINUED | OUTPATIENT
Start: 2024-08-30 | End: 2024-09-04

## 2024-08-30 RX ORDER — MULTIPLE VITAMINS W/ MINERALS TAB 9MG-400MCG
1 TAB ORAL 2 TIMES DAILY
Status: DISCONTINUED | OUTPATIENT
Start: 2024-08-30 | End: 2024-09-18

## 2024-08-30 RX ORDER — AMIODARONE HYDROCHLORIDE 200 MG/1
200 TABLET ORAL DAILY
Status: DISCONTINUED | OUTPATIENT
Start: 2024-08-31 | End: 2024-09-18

## 2024-08-30 RX ADMIN — FUROSEMIDE 40 MG: 10 INJECTION, SOLUTION INTRAVENOUS at 18:58

## 2024-08-30 RX ADMIN — POTASSIUM CHLORIDE 20 MEQ: 750 TABLET, EXTENDED RELEASE ORAL at 18:58

## 2024-08-30 RX ADMIN — Medication 1 TABLET: at 18:59

## 2024-08-30 ASSESSMENT — ACTIVITIES OF DAILY LIVING (ADL)
ADLS_ACUITY_SCORE: 37
ADLS_ACUITY_SCORE: 35
ADLS_ACUITY_SCORE: 37
ADLS_ACUITY_SCORE: 37

## 2024-08-30 NOTE — LETTER
"  Mechanical Circulatory Support Program  [place] [address], [facility] [address] [place] [address]  [phone] Office Phone  [phone] Fax Number  VAD EDUCATION PLAN for Duane C Johnson  Caregiver: Melissa    Pre-Study  Please go to this website to watch the HeartMate 3 education videos. There are 17 short videos. Watching these videos give you an excellent foundation for your VAD knowledge and expedites your VAD education.   Go to: HeartCloudJay  Click:  I am a patient   Click:  Living with a left ventricular heart device   Scroll down to  Heartmate 3 LVAD Patient Education Program   Watch each of the HeartMate 3 videos. (Do not watch the HeartMate 2 videos.)  You may also sign up for the TEXT videos. Text the word  LIFE    to [phone]. A new short video will be sent to your phone daily for 2 weeks, with reminder videos at 6 months. These short videos mirror the videos above, and are delivered directly to your mobile device each day.  The VAD binder and the caregiver folder will be delivered to Duane C Johnson's room once they are out of the ICU. Please review these materials   Please review the large patient manual. There are two of these manuals in Duane C Johnson's room.   Please watch the dressing change video by scanning the QR code below or by visiting [email or web address]          VAD Education Session  We will plan to do 5-7 in person, 2 hour education sessions once Duane C Johnson is transferred to [address]. Depending on how education is going we may need to add sessions to ensure everyone understands the information.  We will set up times for education during normal business hours (Monday- Friday, 8am-3:30pm).   In-Person, we will educate up to two people to be trained caregivers. If other people want to participate they can join virtually through the E[employer] Video or another video phone courtney, but will not be \"trained caregivers\".     Who Will Learn What:  All of the VAD Material " including the Emergency Controller Change Procedure: the patient and the two 30 day caregivers.  The Sterile Dressing Change: Melissa  Who will take the test: the patient and the 30 day caregivers     Franchesca Haddad RN  RN VAD Coordinator  [email or web address]   [phone]    24/7 On-Call VAD Coordinator  [phone], option 4, ask to talk to the VAD Coordinator on-Call. The  will page the on-Call VAD Coordinator. The On-call VAD Coordinator will call you back within 5 or 10 minutes.

## 2024-08-30 NOTE — TELEPHONE ENCOUNTER
Message from Dr Cruz: Eduarda had connected with patient and wife yesterday and we ordered more labs - CMP which is quite stable. BNP is higher. He has had low BP and has not been taking diuretics. He has known severe HF and it maybe best to get hospitalized to cards2 team to work on diuresis and further management. Can either go to ER or direct admit. With low BP, would be difficult to diurese effectively as out patient. UA was abnormal in ER and unclear if further work up was done for UTI   Dr Sloan is on service and I will connect with him once we have spoken to patient/ wife.     Called Duane and Melissa and reviewed above recommendations with them.  After some discussion, they were willing to head to the ER now to start treatment/diuresis.  Canceled scheduled echo at  per discussion- can be done inpatient if needed.  Updated Dr Cruz that they were heading into the UER.

## 2024-08-30 NOTE — LETTER
"Faxed to:  [facility]  Fax Number:  [phone]                                                                                                                                                                        [email or web address]   Email Order Form to [email or web address]  Phone: [phone] Fax: [phone]  Patient Name: Duane C Johnson Date: 09/20/24   Facility Name: [facility]  Fax Number: [phone]    VAD Coordinator/ :   Franchesca Haddad RN Phone: [phone]    Email Address:         [email or web address]   Patient's Primary Insurance Upon Receiving the VAD unit:          Dressing Change Frequency: Daily X Every Other Day   Other (Specify)              Duration of Need:  DT (Lifetime) X BTT (until transplant)   Other (Specify)      NEW ORDERS ONLY: PLEASE SUBMIT PATIENT DEMOGRAPHICS, POST OP NOTES & NOTES FROM MOST RECENT CLINICAL VISIT WITH ORDER FORM.                  Fax: [phone]  Or Email Order Form to   [email or web address]     ck Product Size/ Description Quantity    Kit Options     X Medline Driveline Management Tray [id number]    Daily Up to 30    Medline Driveline Management Tray [id number]  Sensitive Up to 30    Medline Driveline Management Tray [id number]  Weekly Up to 10          [place] Options     X [facility] [place] /  Michael [place] [id number] / [id number] Up to 30   X CathGrip / CathGrip Low Profile Securement [place] Sm/1 strap  Med/2 Strap    Large/2 Strap Up to 30    [facility] [place] Cath Secure Dual Tab [place] [id number] Up to 30    Abdominal Binder Sm              Med              Lg   Up to 1          Adhesive Options     X Medipore Tape 1\"     2             cloth Up to 5 rolls    3M Micropore S Surgical Tape (Kind Removal Silicone Tape) 1\"     2\" Up to 5 rolls    Safe N Simple Blue silicone tape 1\"     2\" Up to 5 rolls    Blenderm Surgical Tape / Transpore Tape 1\"     " "2\" Up to 5 rolls    Simpurity Silicone Transparent Film 4\" x 5\" Up to 30    Smith and Nephew IV 3000 Transparent Film 4\" x 5.5\" Up to 30          Alternate Cleansers      Betadine Swabsticks (Povidone Iodine) 3/pack Up to 30          Individual Supplies      Aquaguard/Shower Shield 7 x 7       9  x 9      10  x 12  Up to 35   X FreeDerm Adhesive Remover Wipes Wipes Up to 60    FreeDerm Adhesive Remover Spray 1 oz  3oz  Spray             Up to 4    Uni-Solve Adhesive Remover Wipes Box Up to 2    BioPlus Skin Barrier Wipes Box  Up to 2    3M Cavilon No-sting Barrier Swabstick Swabstick Up to 60   X Sterile Vinyl PF gloves Sizes   6   7   8   9                Pair Up to 30    Non Sterile Gloves 100/box        Size: S    M    L   XL Up to 3    Biopatch 1       1.5\" Up to 30    Silverlon 1.5cm Square disc [id number] Up to 30    Alcohol Prep Pads Box Up to 1 box     My signature below certifies the medical necessity of the above approved products and I certify the patient has been trained in the use of all products. The patient is aware that [facility] will contact him/her regarding the shipment of ordered dressing supplies.     Provider Name: Ham Cruz [facility]#: [id number]   Provider Signature:  Digitally signed by   Ham Cruz 09/20/24 11:22 AM Provider Number: [phone]   [facility] will confirm receipt for all initial orders by sending a fax to the provider listed above.      "

## 2024-08-30 NOTE — LETTER
"  Mechanical Circulatory Support Program  [place] [address], [facility] [address]  [address] [place] [address]  [phone] Office Phone  [phone] Fax Number  Faxed to:  [facility]  Fax Number:  [phone]        [facility]  DOPPLER / BP CUFF ORDER FORM  Please submit this document along with patient's supply order. one doppler and one BP cuff per patient  Date: 10/02/24  Facility Name: [facility]      Patient: Duane C Johnson  YOB: 1950    VAD Team Member:  Franchesca Haddad, RN  Phone Number:  [phone]  Fax Number: [phone]    Check products desired:  _x___        Louis Sonotrax Vascular Doppler (w/ 8 MHz probe)  _x___       Clear Ultrasound Gel  _x___        [id number] Blood Pressure Cuff w/ lifetime calibration  Please measure to ensure correct size as equipment cannot be returned.   __x__    Adult circumference 9.8\" x 15.7\" (24.9 cm x 39.8 cm) overall length 20.9\" (53.9 cm)          ____    Pediatric Circumference 7.5\" X 9.8\" (19 cm x 24.9 cm) overall length 15\" (38 cm)          ____    Adult XL circumference 13.4\" X 19.7\" (34 cm x 50 cm) overall length 25.6\" (65 cm)      Signed,      Ham Cruz MD  Heart Failure, Mechanical Circulatory Support and Transplant Cardiology   of Medicine, Division of Cardiology, [facility]    "

## 2024-08-30 NOTE — ED TRIAGE NOTES
74 year old male with history of heart failure and AICD, presents to ED after being told to come in for further assessment of elevated BMP (03851) which was obtained yesterday. Patient denies any edema or shortness of breath at rest, but does note increasing fatigue.      Triage Assessment (Adult)       Row Name 08/30/24 1359          Triage Assessment    Airway WDL WDL        Respiratory WDL    Respiratory WDL WDL        Skin Circulation/Temperature WDL    Skin Circulation/Temperature WDL WDL        Cardiac WDL    Cardiac WDL X  heart failure with ICD        Peripheral/Neurovascular WDL    Peripheral Neurovascular WDL WDL

## 2024-08-30 NOTE — H&P
Cardiology    History and Physical         Date of Admission:  8/30/2024  Date of Service (when I saw the patient): 8/30/24    Assessment & Plan   Duane C Johnson is a 74 year old male pmhx of anterior STEMI s/p PCI to the pLAD on 10/26/23 with a VT/VF arrest the next day (10/27) with patent stents and unchanged anatomy on repeat angiogram. Placed on amiodarone and discharged 11/03/23, ICD was not indicated as this was within 48h of his MI. His EF prior do discharge was 20-30% with a large area of akinesis in the LAD placed on apixaban due to this. Has had multiple admissions for HF exacerbation last year.  He presents for 2 weeks of worsening fatigue; found to have BNP of 16k and L pleural effusion.    # Acute heart failure exacerbation  # HFrEf 2/2 ICM (EF 20-30% by TTE 10/23)  #mod-severe MR   #Afib  Fluid status: Hypervolemic,   ACEi/ARB/ARNi/afterload reduction: lisinopril 2.5 mg daily, hold for SBP < 90  BB: metoprolol succinate 12.5 mg HS, hold for SBP < 90, HR < 50  Aldosterone antagonist: unable to start d/t hypotension   SGLT2i: Empagliflozin 10mg  SCD prophylaxis: s/p ICD 5/2024  NSAID use: contraindicated  Antiarrhythmic: Amiodarone 200mg   Diuretic: Was on Bumex 1mg daily; this was changed to PRN on 7/5/24 when Empagliflozin was started   - BNP of 16k on admission with L sided pleural effusion   - IV lasix 40mg ordered; evaluate response and redose as needed   - Holding GDMT while diuresing because of soft pressures.    - low dose heparin for anticoagulation   - TTE in the morning; if severe MR plan for MELBA and RHC on Tuesday to evaluate if candidate for mitral clip.     # CAD s/p PCI to LAD  # STEMI  # VT/VF arrest  # ICD placed on 5/24  - on plavix   - Was on apixaban for low EF (akinesis of the mid-distal anterior,mid anteroseptum, distal septum and the apex) and questionable history of Afib. Since there was no episode of Afib captured apixaban was stopped on 7/5/24.   - Has been     #L inguinal  hernia  Pt reports this has been present for some time. Not causing any pain. Not a surgical candidate with current HF status so will not consult surgery at this time.     FEN: 2L fluid and 2G sodium restriction.  DVT Prophylaxis: Heparin SQ  Code Status: Full Code  Disposition: Expected discharge in 2-3 days once HF exacerbation is controlled and he has been evaluated for treatments for probable severe MR.    Pt seen and discussed with the staff attending Dr. Sloan, who agrees with the assessment and plan.    Cristobal Fisher MD  IM PGY 1    Primary Care Physician   Jose Coles    Primary Cardiologist Dr. Cruz    Chief Complaint   Fatigue    History is obtained from the patient    History of Present Illness   Duane C Johnson is a 74 year old male with pmhx of anterior STEMI s/p PCI to the pLAD on 10/26/23 with a VT/VF arrest the next day (10/27) with patent stents and unchanged anatomy on repeat angiogram. Placed on amiodarone and discharged 11/03/23, ICD was not indicated as this was within 48h of his MI. His EF prior do discharge was 20-30% with a large area of akinesis in the LAD placed on apixaban due to this. Has had multiple admissions for HF exacerbation last year. 2 ED visits in the last 2 weeks for fatigue.      He presents with 2 week history of fatigue and an elevated BNP in clinic (16k). Pt reports that he has Denies any SOB at rest or with exertion but states that whenever he exerts himself at all he gets extremely tired. To the point that walking from the bedroom to the living room means that he has to lay down on the couch to nap. Denies any LE edema, SOB, DIOP, or othopnea.     Past Medical History    I have reviewed this patient's medical history and updated it with pertinent information if needed.   Past Medical History:   Diagnosis Date    Benign essential hypertension     CAD (coronary artery disease)     Chronic systolic heart failure (H)     Hypertension     ST elevation myocardial  infarction (STEMI), unspecified artery (H)     Ventricular fibrillation (H)        Past Surgical History   I have reviewed this patient's surgical history and updated it with pertinent information if needed.  Past Surgical History:   Procedure Laterality Date    COLONOSCOPY N/A 3/23/2023    Procedure: COLONOSCOPY, FLEXIBLE, WITH LESION REMOVAL USING SNARE;  Surgeon: Jose Faustin MD;  Location: [facility]    CV CENTRAL VENOUS CATHETER PLACEMENT N/A 10/26/2023    Procedure: Central Venous Catheter Placement;  Surgeon: Rob Lyles MD;  Location: [facility] CARDIAC CATH LAB    CV CORONARY ANGIOGRAM N/A 10/27/2023    Procedure: Coronary Angiogram;  Surgeon: Rob Lyles MD;  Location: [facility] CARDIAC CATH LAB    CV CORONARY ANGIOGRAM N/A 10/26/2023    Procedure: Coronary Angiogram;  Surgeon: Rob Lyles MD;  Location: [facility] CARDIAC CATH LAB    CV LEFT HEART CATH N/A 10/26/2023    Procedure: Left Heart Catheterization;  Surgeon: Rob Lyles MD;  Location: [facility] CARDIAC CATH LAB    CV PCI N/A 10/27/2023    Procedure: Percutaneous Coronary Intervention;  Surgeon: Rob Lyles MD;  Location: [facility] CARDIAC CATH LAB    CV PCI STENT DRUG ELUTING N/A 10/26/2023    Procedure: Percutaneous Coronary Intervention Stent;  Surgeon: Rob Lyles MD;  Location: [facility] CARDIAC CATH LAB    CV RIGHT HEART CATH MEASUREMENTS RECORDED N/A 5/6/2024    Procedure: Heart Cath Right Heart Cath;  Surgeon: Rob Lyles MD;  Location: [facility] CARDIAC CATH LAB    EP ICD INSERT SINGLE N/A 5/8/2024    Procedure: Implantable Cardioverter Defibrillator Device & Lead Implant Dual;  Surgeon: Guero Black MD;  Location: [facility] CARDIAC CATH LAB    INJECTION, HYDROGEL SPACER N/A 4/22/2024    Procedure: INJECTION, HYDROGEL SPACER AND FIDUCIAL MARKER PLACEMENT;  Surgeon: Stanislaw Barros MD;  Location: [facility]       Prior to Admission Medications   Prior to  Admission Medications   Prescriptions Last Dose Informant Patient Reported? Taking?   amiodarone (PACERONE) 200 MG tablet 8/30/2024 Spouse/Significant Other No Yes   Sig: Take 1 tablet (200 mg) by mouth daily   atorvastatin (LIPITOR) 80 MG tablet 8/30/2024 Spouse/Significant Other No Yes   Sig: Take 1 tablet (80 mg) by mouth daily   cetirizine (ZYRTEC) 10 MG tablet 8/30/2024 Spouse/Significant Other Yes Yes   Sig: Take 10 mg by mouth daily   clopidogrel (PLAVIX) 75 MG tablet 8/30/2024  No Yes   Sig: Take 1 tablet (75 mg) by mouth daily   empagliflozin (JARDIANCE) 10 MG TABS tablet 8/29/2024  No Yes   Sig: Take 1 tablet (10 mg) by mouth daily   escitalopram (LEXAPRO) 10 MG tablet Unknown  Yes Yes   Sig: Take 10 mg by mouth daily   fish oil-omega-3 fatty acids 1000 MG capsule 8/30/2024 Spouse/Significant Other Yes Yes   Sig: Take 1 g by mouth 2 times daily Also contains another supplement   ibuprofen (ADVIL/MOTRIN) 400 MG tablet 8/30/2024  Yes Yes   Sig: Take 400 mg by mouth every 6 hours as needed for moderate pain.   lisinopril (ZESTRIL) 2.5 MG tablet 8/30/2024  No Yes   Sig: Take 1 tablet (2.5 mg) by mouth daily HOLD for systolic blood pressure < 90   magnesium oxide (MAG-OX) 400 MG tablet 8/30/2024  No Yes   Sig: Take 1 tablet (400 mg) by mouth daily   melatonin 5 MG tablet 8/29/2024  No Yes   Sig: Take 1-2 tablets (5-10 mg) by mouth at bedtime   metoprolol succinate ER (TOPROL XL) 25 MG 24 hr tablet 8/29/2024  No Yes   Sig: Take 0.5 tablets (12.5 mg) by mouth daily Hold for systolic blood pressure less than 90.   multivitamin w/minerals (THERA-VIT-M) tablet 8/30/2024 Spouse/Significant Other Yes Yes   Sig: Take 1 tablet by mouth 2 times daily   potassium chloride tray ER (KLOR-CON M20) 20 MEQ CR tablet 8/29/2024  No Yes   Sig: TAKE ONE TABLET BY MOUTH ONCE DAILY   vitamin C (ASCORBIC ACID) 1000 MG TABS 8/30/2024 Spouse/Significant Other Yes Yes   Sig: Take 1,000 mg by mouth daily      Facility-Administered  Medications: None     Allergies   Allergies   Allergen Reactions    Brilinta [Ticagrelor] Other (See Comments) and Difficulty breathing     Per pt and spouse, hyperventilation.    Clonazepam Other (See Comments)     Per spouse, acted like a zombie and he was shaky, could barely talk.       Social History   I have reviewed this patient's social history and updated it with pertinent information if needed. Duane C Johnson  reports that he quit smoking about 10 months ago. His smoking use included cigarettes. He started smoking about 30 years ago. He has a 7.5 pack-year smoking history. He has been exposed to tobacco smoke. He has never used smokeless tobacco. He reports current alcohol use. He reports that he does not use drugs.    Family History   I have reviewed this patient's family history and updated it with pertinent information if needed.   Family History   Problem Relation Age of Onset    Other Cancer Mother     Obesity Mother     GI problems Father     Uterine Cancer Sister        Review of Systems   The 5 point Review of Systems is negative other than noted in the HPI or here.     Physical Exam   Temp: 97.2  F (36.2  C) Temp src: Oral BP: 94/59 Pulse: 80   Resp: 16 SpO2: 98 % O2 Device: None (Room air)    Vital Signs with Ranges  Temp:  [97.2  F (36.2  C)] 97.2  F (36.2  C)  Pulse:  [80] 80  Resp:  [16] 16  BP: (94)/(59) 94/59  SpO2:  [98 %] 98 %  145 lbs 8 oz    GEN:  Alert, interactive, appears comfortable, NAD.  HEENT:  Normocephalic/atraumatic, no scleral icterus, no nasal discharge, OP clear with MMM.  CV:  Regular rate and rhythm, no murmur or JVD.   LUNGS:  Crackles in left base. Otherwise clear to auscultation.   ABD:  Active bowel sounds, soft, non-tender/non-distended.  No rebound/guarding/rigidity.  EXT:  No edema or cyanosis.  Hands/feet warm to touch with good signs of peripheral perfusion.   SKIN:  Dry to touch, no exanthems noted in the visualized areas.  NEURO:  Alert and oriented, no new  focal deficits appreciated.  PSCYH: Normal mood and affect    Data   Data reviewed today:  I personally reviewed:    Examination: XR CHEST 2 VIEWS 8/30/2024 3:06 PM    Indication: Shortness of breath    Comparison: Radiograph 8/24/2024. CT 12/25/2023.    Findings:  PA and lateral views of the chest. Left chest wall implantable cardiac  defibrillator with grossly intact leads/shock coil. Coronary stenting.  Trachea is midline. Stable mild cardiomegaly. Mild blunting of the  left costophrenic angle, likely representing small pleural effusion.  No pneumothorax. No focal consolidation or any definite abnormal  airspace opacities. No acute or suspicious osseous abnormality.  Unremarkable visualized abdomen.     Impression   Impression:   1. Stable mild cardiomegaly with implantable cardiac defibrillator and  coronary stenting.  2. Small left pleural effusion.    I have personally reviewed the examination and initial interpretation  and I agree with the findings.    PANCHITO EID MD         SYSTEM ID:  [id number]     12/6/2023 CMR  Clinical history: 73 year old with anterior STEMI, cardiac arrest in Oct 2023  Comparison CMR: none  1. The left ventricle is severely enlarged. There is wall thinning and akinesis of the mid-distal anterior,  mid anteroseptum, distal septum and the apex  The global systolic function is severely reduced. The LVEF is 28%.  2. The right ventricle is normal in cavity size. The global systolic function is normal. The RVEF is 52%.   3. The right atrium is normal and the left atrium is severely enlarged.  4. There is mild mitral regurgitation.   5. There is transmural late gadolinium enhancement in mid-distal anterior, mid anteroseptum, distal  septum and the apex consistent with a large infarction in the LAD territory.   6. There is no pericardial effusion.  7. There is no intracardiac thrombus.  8. There are bilateral pleural effusions.  CONCLUSIONS:   Ischemic cardiomyopathy with a large  anteroapical infarction.   Severely reduced left ventricular function and normal right ventricular function, LVEF 28% and RVEF 52%.    Cardiac Catheterization:  10/26/23    1st Mrg lesion is 20% stenosed.    Prox LAD to Mid LAD lesion is 100% stenosed.    1st Diag-1 lesion is 80% stenosed.    1st Diag-2 lesion is 50% stenosed.    Dist LAD lesion is 100% stenosed.    RPDA lesion is 70% stenosed.    Dist RCA lesion is 40% stenosed.     Anterior STEMI  Two vessel obstructive CAD (100% pLAD occlusion, 70% rPDA stenosis, 80% diagonal 1 stenosis)  PCI of pLAD to mLAD with BRENDA x 1 (Synergy 2.50x 28 mm) post dilated to 2.75 vessel  CVC placement in RIJ  LVEDP of 26 mmHg  Hemostasis of RRA with TR band     2/19/2024 Echo  Interpretation Summary     1. The left ventricle is moderately dilated. Left ventricular hypertrophy is  noted by two-dimensional echocardiography. Proximal septal thickening is  noted. Left ventricular systolic function is moderate to severely reduced. The  visual ejection fraction is 25-30%. Biplane LVEF is 25%. Diastolic Doppler  findings (E/E' ratio and/or other parameters) suggest left ventricular filling  pressures are increased. There is severe hypokinesis to akinesis of the mid  anterior/anterolateral/anteroseptal/inferoseptal walls and all apical segments  of the left ventricle. There is no thrombus seen in the left ventricle.  2. The right ventricle is normal size. The right ventricular systolic function  is normal.  3. The left atrium is moderately dilated. Right atrial size is normal. There  is no color Doppler evidence of an atrial shunt.  4. There is moderate to mod-severe (2-3+) mitral regurgitation.  5. There is mild to moderate (1-2+) tricuspid regurgitation.Right ventricular  systolic pressure is elevated, consistent with moderate pulmonary  hypertension.  6. No pericardial effusion.  7. In direct comparison to the previous study dated 10/30/2023, there has been  an interval increase in  the degree of mitral regurgitation. The other findings  are similar.    RHC 5/6/24  RA: 10/9/6 mmHg  RV: 61/11 mmHg  PA: 63/24/38 mmHg  PCWP: Unable to obtain accurate measurement  CO/CI (Goldy): 3.89/2.3 L/min/m2    PA sat: 64%  MAP: 85 mmHg       Right sided filling pressures are normal.    Mild elevated pulmonary hypertension.    Normal cardiac output level.      Device interrogated on 8/30/24  Device: SofGenie [id number] RESONATE HF ICD  Normal device function.  Mode: DDDR  bpm  AP: 33%  : 3%  Intrinsic rhythm: Afib w/ VS  bpm  Thoracic Impedance: Below reference line, suggests possible intrathoracic fluid accumulation.  Lead Trends Appear Stable.  Estimated battery longevity to RRT = 12.5 years.    Atrial Arrhythmia: AF episode began on 8/28/24  AF [place]: 11%  Anticoagulant: Eliquis  Ventricular Arrhythmia: None  Setting Changes: Rate response turned ON in device, pacing mode changed to DDDR from DDD, fallback rate during AT/AF episodes 70 bpm.      Recent Labs   Lab 08/30/24  1455 08/29/24  1632 08/26/24  1112 08/24/24  1302   WBC 9.8  --  6.4 5.3   HGB 10.2*  --  11.2* 11.1*   MCV 97  --  94 94     --  165 156   INR 1.24*  --   --   --     135 136 134*   POTASSIUM 4.3 4.3 4.3 4.1   CHLORIDE 104 104 104 100   CO2 24 24 24 26   BUN 21.9 21.7 26.1* 29.1*   CR 1.28* 1.21* 1.36* 1.55*   ANIONGAP 8 7 8 8   PRANAV 8.7* 8.5* 8.6* 8.5*   * 105* 110* 124*   ALBUMIN 3.2* 3.1* 3.3* 3.4*   PROTTOTAL 6.6 6.4 6.7 6.8   BILITOTAL 0.4 0.4 1.1 0.6   ALKPHOS 113 120 106 85   ALT 39 42 62 36   AST 43 56* 91* 51*

## 2024-08-30 NOTE — MEDICATION SCRIBE - ADMISSION MEDICATION HISTORY
Medication Scribe Admission Medication History    Admission medication history is complete. The information provided in this note is only as accurate as the sources available at the time of the update.    Information Source(s): Patient and Spouse  via in-person    Pertinent Information: N/A    Changes made to PTA medication list:  Added: None  Deleted: None  Changed: None    Allergies reviewed with patient and updates made in EHR: no    Medication History Completed By: Octavia Simpson 8/30/2024 3:26 PM    PTA Med List   Medication Sig Last Dose    amiodarone (PACERONE) 200 MG tablet Take 1 tablet (200 mg) by mouth daily 8/30/2024    atorvastatin (LIPITOR) 80 MG tablet Take 1 tablet (80 mg) by mouth daily 8/30/2024    cetirizine (ZYRTEC) 10 MG tablet Take 10 mg by mouth daily 8/30/2024    clopidogrel (PLAVIX) 75 MG tablet Take 1 tablet (75 mg) by mouth daily 8/30/2024    empagliflozin (JARDIANCE) 10 MG TABS tablet Take 1 tablet (10 mg) by mouth daily 8/29/2024    escitalopram (LEXAPRO) 10 MG tablet Take 10 mg by mouth daily Unknown    fish oil-omega-3 fatty acids 1000 MG capsule Take 1 g by mouth 2 times daily Also contains another supplement 8/30/2024    ibuprofen (ADVIL/MOTRIN) 400 MG tablet Take 400 mg by mouth every 6 hours as needed for moderate pain. 8/30/2024    lisinopril (ZESTRIL) 2.5 MG tablet Take 1 tablet (2.5 mg) by mouth daily HOLD for systolic blood pressure < 90 8/30/2024    magnesium oxide (MAG-OX) 400 MG tablet Take 1 tablet (400 mg) by mouth daily 8/30/2024    melatonin 5 MG tablet Take 1-2 tablets (5-10 mg) by mouth at bedtime 8/29/2024    metoprolol succinate ER (TOPROL XL) 25 MG 24 hr tablet Take 0.5 tablets (12.5 mg) by mouth daily Hold for systolic blood pressure less than 90. 8/29/2024    multivitamin w/minerals (THERA-VIT-M) tablet Take 1 tablet by mouth 2 times daily 8/30/2024    potassium chloride tray ER (KLOR-CON M20) 20 MEQ CR tablet TAKE ONE TABLET BY MOUTH ONCE DAILY 8/29/2024     vitamin C (ASCORBIC ACID) 1000 MG TABS Take 1,000 mg by mouth daily 8/30/2024

## 2024-08-30 NOTE — ED PROVIDER NOTES
[facility] [place] EMERGENCY DEPARTMENT ([facility])    8/30/24       ED PROVIDER NOTE    History     Chief Complaint   Patient presents with    Abnormal Labs     HPI  Duane C Johnson is a 74 year old male with a past medical history of CAD, ischemic cardiomyopathy, systolic CHF, AICD, pneumonia, hypotension, prostate cancer, and who presents to the ED for further evaluation [place] [address] as well as worsening fatigue over the past 1.5 weeks.     Patient denies any edema or shortness of breath at rest.  Denies abnormal weight gain. Patient was taken off Jardiance yesterday.     Patient had been seen yesterday and had labs ordered which came back today. UA was abnormal in ER and unclear if further work up was done for UTI. CMP which is quite stable. BNP is higher. He has had low BP and has not been taking diuretics. He has known severe HF.    Per Chart Review:  IMPRESSION XR CHEST 2 VIEWS 8/24/2024: Left subclavian approach pacer defibrillator has right atrial appendage and right ventricular leads. The cardiac silhouette remains normal in size. Unchanged aortic atheromatous calcifications.    The lungs are symmetrically inflated and clear. No interstitial or alveolar opacities. No pleural effusion or pneumothorax.    Past Medical History  Past Medical History:   Diagnosis Date    Benign essential hypertension     CAD (coronary artery disease)     Chronic systolic heart failure (H)     Hypertension     ST elevation myocardial infarction (STEMI), unspecified artery (H)     Ventricular fibrillation (H)      Past Surgical History:   Procedure Laterality Date    COLONOSCOPY N/A 3/23/2023    Procedure: COLONOSCOPY, FLEXIBLE, WITH LESION REMOVAL USING SNARE;  Surgeon: Jose Faustin MD;  Location: [facility]    CV CENTRAL VENOUS CATHETER PLACEMENT N/A 10/26/2023    Procedure: Central Venous Catheter Placement;  Surgeon: Rob Lyles MD;  Location:  HEART CARDIAC CATH LAB    CV CORONARY ANGIOGRAM  N/A 10/27/2023    Procedure: Coronary Angiogram;  Surgeon: Rob Lyles MD;  [facility]    CV CORONARY ANGIOGRAM N/A 10/26/2023    Procedure: Coronary Angiogram;  Surgeon: Rob Lyles MD;  Location: [facility] C[facility]    CV LEFT HEART CATH N/A 10/26/2023    Procedure: Left Heart Catheterization;  Surgeon: Rob Lyles MD;  Location:  HEART CARDIAC CATH LAB    CV PCI N/A 10/27/2023    Procedure: Percutaneous Coronary Intervention;  Surgeon: Rob Lyles MD;  Location: [facility]    CV PCI STENT DRUG ELUTING N/A 10/26/2023    Procedure: Percutaneous Coronary Intervention Stent;  Surgeon: Rob Lyles MD;  Location:  HEART CARDIAC CATH LAB    CV RIGHT HEART CATH MEASUREMENTS RECORDED N/A 5/6/2024    Procedure: Heart Cath Right Heart Cath;  Surgeon: Rob Lyles MD;  Location:  HEART CARDIAC CATH LAB    EP ICD INSERT SINGLE N/A 5/8/2024    Procedure: Implantable Cardioverter Defibrillator Device & Lead Implant Dual;  Surgeon: Guero Black MD;  Location:  HEART CARDIAC CATH LAB    INJECTION, HYDROGEL SPACER N/A 4/22/2024    Procedure: INJECTION, HYDROGEL SPACER AND FIDUCIAL MARKER PLACEMENT;  Surgeon: Stanislaw Barros MD;  Location: [facility]     amiodarone (PACERONE) 200 MG tablet  atorvastatin (LIPITOR) 80 MG tablet  cetirizine (ZYRTEC) 10 MG tablet  clopidogrel (PLAVIX) 75 MG tablet  empagliflozin (JARDIANCE) 10 MG TABS tablet  escitalopram (LEXAPRO) 10 MG tablet  fish oil-omega-3 fatty acids 1000 MG capsule  ibuprofen (ADVIL/MOTRIN) 400 MG tablet  lisinopril (ZESTRIL) 2.5 MG tablet  magnesium oxide (MAG-OX) 400 MG tablet  melatonin 5 MG tablet  metoprolol succinate ER (TOPROL XL) 25 MG 24 hr tablet  multivitamin w/minerals (THERA-VIT-M) tablet  potassium chloride tray ER (KLOR-CON M20) 20 MEQ CR tablet  vitamin C (ASCORBIC ACID) 1000 MG TABS      Allergies   Allergen Reactions    Brilinta  "[Ticagrelor] Other (See Comments) and Difficulty breathing     Per pt and spouse, hyperventilation.    Clonazepam Other (See Comments)     Per spouse, acted like a zombie and he was shaky, could barely talk.     Family History  Family History   Problem Relation Age of Onset    Other Cancer Mother     Obesity Mother     GI problems Father     Uterine Cancer Sister      Social History   Social History     Tobacco Use    Smoking status: Former     Current packs/day: 0.00     Average packs/day: 0.3 packs/day for 30.0 years (7.5 ttl pk-yrs)     Types: Cigarettes     Start date: 10/26/1993     Quit date: 10/26/2023     Years since quittin.8     Passive exposure: Past    Smokeless tobacco: Never    Tobacco comments:     Quit 10/26/2023   Vaping Use    Vaping status: Never Used   Substance Use Topics    Alcohol use: Yes     Comment: 1-2 beers per day    Drug use: No      A complete review of systems was performed with pertinent positives and negatives noted in the HPI, and all other systems negative.    Physical Exam   BP: 94/59  Pulse: 80  Temp: 97.2  F (36.2  C)  Resp: 16  Height: 167.6 cm (5' 6\")  Weight: 66 kg (145 lb 8 oz)  SpO2: 98 %  Physical Exam  Constitutional:       General: He is not in acute distress.     Appearance: Normal appearance. He is not toxic-appearing or diaphoretic.   HENT:      Head: Atraumatic.   Eyes:      General: No scleral icterus.     Conjunctiva/sclera: Conjunctivae normal.      Pupils: Pupils are equal, round, and reactive to light.   Cardiovascular:      Rate and Rhythm: Normal rate and regular rhythm.      Heart sounds: Normal heart sounds. No murmur heard.     No friction rub. No gallop.   Pulmonary:      Effort: Pulmonary effort is normal. No respiratory distress.      Breath sounds: Normal breath sounds. No stridor. No wheezing, rhonchi or rales.   Chest:      Chest wall: No tenderness.   Abdominal:      General: Abdomen is flat. Bowel sounds are normal. There is no distension.      " Palpations: Abdomen is soft. There is no mass.      Tenderness: There is no abdominal tenderness. There is no right CVA tenderness, left CVA tenderness, guarding or rebound.      Hernia: No hernia is present.   Musculoskeletal:         General: No tenderness or deformity.      Cervical back: Neck supple.      Right lower leg: No edema.      Left lower leg: No edema.   Skin:     General: Skin is warm.      Findings: No rash.   Neurological:      General: No focal deficit present.      Mental Status: He is alert.      Cranial Nerves: No cranial nerve deficit.      Motor: Weakness (diffuse) present.      Gait: Gait normal.           ED Course, Procedures, & Data      Procedures            EKG Interpretation:      Interpreted by Brian George MD, MD  Time reviewed: on arrival  Symptoms at time of EKG: weakness   Rhythm: atrial fibrillation - controlled paced  Rate: Normal  Axis: Normal  Ectopy: none  Conduction: normal  ST Segments/ T Waves: No ST-T wave changes  Q Waves: none  Comparison to prior: Unchanged from 5/2024    Clinical Impression: no acute changes                  Results for orders placed or performed during the hospital encounter of 08/30/24   XR Chest 2 Views     Status: None    Narrative    Examination: XR CHEST 2 VIEWS 8/30/2024 3:06 PM    Indication: Shortness of breath    Comparison: Radiograph 8/24/2024. CT 12/25/2023.    Findings:  PA and lateral views of the chest. Left chest wall implantable cardiac  defibrillator with grossly intact leads/shock coil. Coronary stenting.  Trachea is midline. Stable mild cardiomegaly. Mild blunting of the  left costophrenic angle, likely representing small pleural effusion.  No pneumothorax. No focal consolidation or any definite abnormal  airspace opacities. No acute or suspicious osseous abnormality.  Unremarkable visualized abdomen.      Impression    Impression:   1. Stable mild cardiomegaly with implantable cardiac defibrillator and  coronary stenting.  2.  Small left pleural effusion.    I have personally reviewed the examination and initial interpretation  and I agree with the findings.    PANCHITO EID MD         SYSTEM ID:  [id number]   INR     Status: Abnormal   Result Value Ref Range    INR 1.24 (H) 0.85 - 1.15   Comprehensive metabolic panel     Status: Abnormal   Result Value Ref Range    Sodium 136 135 - 145 mmol/L    Potassium 4.3 3.4 - 5.3 mmol/L    Carbon Dioxide (CO2) 24 22 - 29 mmol/L    Anion Gap 8 7 - 15 mmol/L    Urea Nitrogen 21.9 8.0 - 23.0 mg/dL    Creatinine 1.28 (H) 0.67 - 1.17 mg/dL    GFR Estimate 59 (L) >60 mL/min/1.73m2    Calcium 8.7 (L) 8.8 - 10.4 mg/dL    Chloride 104 98 - 107 mmol/L    Glucose 100 (H) 70 - 99 mg/dL    Alkaline Phosphatase 113 40 - 150 U/L    AST 43 0 - 45 U/L    ALT 39 0 - 70 U/L    Protein Total 6.6 6.4 - 8.3 g/dL    Albumin 3.2 (L) 3.5 - 5.2 g/dL    Bilirubin Total 0.4 <=1.2 mg/dL   Troponin T, High Sensitivity     Status: Abnormal   Result Value Ref Range    Troponin T, High Sensitivity 24 (H) <=22 ng/L   Magnesium     Status: Normal   Result Value Ref Range    Magnesium 2.2 1.7 - 2.3 mg/dL   Nt probnp inpatient (BNP)     Status: Abnormal   Result Value Ref Range    N terminal Pro BNP Inpatient 17,448 (H) 0 - 900 pg/mL   CBC with platelets and differential     Status: Abnormal   Result Value Ref Range    WBC Count 9.8 4.0 - 11.0 10e3/uL    RBC Count 3.26 (L) 4.40 - 5.90 10e6/uL    Hemoglobin 10.2 (L) 13.3 - 17.7 g/dL    Hematocrit 31.5 (L) 40.0 - 53.0 %    MCV 97 78 - 100 fL    MCH 31.3 26.5 - 33.0 pg    MCHC 32.4 31.5 - 36.5 g/dL    RDW 15.8 (H) 10.0 - 15.0 %    Platelet Count 256 150 - 450 10e3/uL    % Neutrophils 90 %    % Lymphocytes 6 %    % Monocytes 2 %    % Eosinophils 1 %    % Basophils 0 %    % Immature Granulocytes 1 %    NRBCs per 100 WBC 0 <1 /100    Absolute Neutrophils 8.8 (H) 1.6 - 8.3 10e3/uL    Absolute Lymphocytes 0.6 (L) 0.8 - 5.3 10e3/uL    Absolute Monocytes 0.2 0.0 - 1.3 10e3/uL    Absolute  Eosinophils 0.1 0.0 - 0.7 10e3/uL    Absolute Basophils 0.0 0.0 - 0.2 10e3/uL    Absolute Immature Granulocytes 0.1 <=0.4 10e3/uL    Absolute NRBCs 0.0 10e3/uL   EKG 12-lead, tracing only     Status: None   Result Value Ref Range    Systolic Blood Pressure  mmHg    Diastolic Blood Pressure  mmHg    Ventricular Rate 89 BPM    Atrial Rate 267 BPM    ME Interval  ms    QRS Duration 96 ms     ms    QTc 481 ms    P Axis  degrees    R AXIS -26 degrees    T Axis 173 degrees    Interpretation ECG       Atrial fibrillation with occasional ventricular-paced complexes and with premature ventricular or aberrantly conducted complexes  Anterolateral infarct , age undetermined  Abnormal ECG  Unconfirmed report - interpretation of this ECG is computer generated - see medical record for final interpretation    Confirmed by - EMERGENCY ROOM, PHYSICIAN ([id number]),  BUDDY ARREDONDO ([id number]) on 8/30/2024 3:18:22 PM     CBC with platelets differential     Status: Abnormal    Narrative    The following orders were created for panel order CBC with platelets differential.  Procedure                               Abnormality         Status                     ---------                               -----------         ------                     CBC with platelets and d...[[id number]]  Abnormal            Final result                 Please view results for these tests on the individual orders.   Results for orders placed or performed in visit on 08/30/24   Cardiac Device Check - Inpatient     Status: None (In process)   Result Value Ref Range    Date Time Interrogation Session [id number]     Implantable Pulse Generator  [employer]     Implantable Pulse Generator Model D533 RESONATE HF ICD     Implantable Pulse Generator Serial Number [id number]     Type Interrogation Session In Clinic     Clinic Name [facility]     Implantable Pulse Generator Type Defibrillator     Implantable Pulse Generator  Implant Date 20240508     Implantable Lead  [employer]     Implantable Lead Model 0672 [place] 4-Front S     Implantable Lead Serial Number [id number]     Implantable Lead Implant Date 20240508     Implantable Lead Polarity Type Bipolar Lead     Implantable Lead Location Detail 1 UNKNOWN     Implantable Lead Special Function 59cm length     Implantable Lead Location Right Ventricle     Implantable Lead Connection Status Connected     Implantable Lead  [employer]     Implantable Lead Model 7841 Ingevity+ MRI     Implantable Lead Serial Number [id number]     Implantable Lead Implant Date 20240508     Implantable Lead Polarity Type Bipolar Lead     Implantable Lead Location Detail 1 UNKNOWN     Implantable Lead Special Function 52cm length     Implantable Lead Location Right Atrium     Implantable Lead Connection Status Connected     Bharathi Setting Mode (NBG Code) DDDR     Bharathi Setting Lower Rate Limit 60 [beats]/min    Bharathi Setting Maximum Tracking Rate 130 [beats]/min    Bharathi Setting Maximum Sensor Rate 130 [beats]/min    Bharathi Setting JUANITO Delay Low 300 ms    Bharathi Setting PAV Delay Low 300 ms    Bharathi Setting PAV Delay High 200 ms    Bharathi Setting JUANITO Delay High 200 ms    Bharathi Setting AT Mode Switch Rate 170 [beats]/min    Bharathi Setting AT Mode Switch Mode VDIR     Lead Channel Setting Sensing Polarity Bipolar     Lead Channel Setting Sensing Sensitivity 0.25 mV    Lead Channel Setting Sensing Adaptation Mode Adaptive     Lead Channel Setting Sensing Polarity Bipolar     Lead Channel Setting Sensing Sensitivity 0.6 mV    Lead Channel Setting Sensing Adaptation Mode Adaptive     Lead Channel Setting Pacing Polarity Bipolar     Lead Channel Setting Pacing Pulse Width 0.4 ms    Lead Channel Setting Pacing Amplitude 2.0 V    Lead Channel Setting Pacing Capture Mode Adaptive     Lead Channel Setting Pacing Polarity Bipolar     Lead Channel Setting Pacing Pulse Width 0.4 ms    Lead  Channel Setting Pacing Amplitude 2.0 V    Lead Channel Setting Pacing Capture Mode Adaptive     Zone Setting Type Category VF     Zone Setting Vendor Type Category VF     Zone Setting Status Active     Zone Setting Detection Interval 250 ms    Zone Setting Type Category VT     Zone Setting Vendor Type Category VT     Zone Setting Status Active     Zone Setting Detection Interval 300 ms    Zone Setting Type Category VT     Zone Setting Vendor Type Category VT-1     Zone Setting Status Monitor     Zone Setting Detection Interval 353 ms    Lead Channel Impedance Value 604 ohm    Lead Channel Impedance Value 320 ohm    Lead Channel Pacing Threshold Amplitude 0.7 V    Lead Channel Pacing Threshold Pulse Width 0.4 ms    Battery Date Time of Measurements [id number]     Battery Status Beginning of Service     Battery Remaining Longevity 150 mo    Battery Remaining Percentage 100 %    Capacitor Charge Type Reformation     Capacitor Last Charge Date Time 20240508112200     Capacitor Charge Time 9.4 s    Bharathi Statistic Date Time Start 20240808000000     Bharathi Statistic Date Time End 21013648602838     Bharathi Statistic RA Percent Paced 33 %    Bharathi Statistic RV Percent Paced 3 %    Atrial Tachy Statistic Date Time Start 30468868673179     Atrial Tachy Statistic Date Time End 76509758227465     Atrial Tachy Statistic AT/AF [place] Percent 11 %    Therapy Statistic Recent Shocks Delivered 0     Therapy Statistic Recent Shocks Aborted 0     Therapy Statistic Recent ATP Delivered 0     Therapy Statistic Recent Date Time Start 35257156216481     Therapy Statistic Recent Date Time End 04774824984643     Therapy Statistic Total Shocks Delivered 0     Therapy Statistic Total Shocks Aborted 0     Therapy Statistic Total ATP Delivered 0     Therapy Statistic Total  Date Time Start 46466287815502     Therapy Statistic Total  Date Time End 74002515174963     Episode Statistic Recent Count 0     Episode Statistic Type Category Other      Episode Statistic Recent Count 0     Episode Statistic Type Category VT     Episode Statistic Vendor Type Category NSVT     Episode Statistic Recent Count 0     Episode Statistic Type Category VT     Episode Statistic Vendor Type Category VT     Episode Statistic Recent Count 0     Episode Statistic Type Category VT     Episode Statistic Vendor Type Category VT-1     Episode Statistic Recent Date Time Start 03920803911723     Episode Statistic Recent Date Time End 91452370550222     Episode Statistic Recent Date Time Start 96999445473474     Episode Statistic Recent Date Time End 07465721729795     Episode Statistic Recent Date Time Start 55091513824091     Episode Statistic Recent Date Time End 72169555166675     Episode Statistic Recent Date Time Start 86409767255644     Episode Statistic Recent Date Time End 96899114817362     Narrative    Patient seen in ED 4 [facility] for evaluation and iterative programming of their ICD per MD orders.     Device: [employer] D533 RESONATE HF ICD  Normal device function.  Mode: DDDR  bpm  AP: 33%  : 3%  Intrinsic rhythm: Afib w/ VS  bpm  Thoracic Impedance: Below reference line, suggests possible intrathoracic fluid accumulation.  Lead Trends Appear Stable.  Estimated battery longevity to RRT = 12.5 years.    Atrial Arrhythmia: AF episode began on 8/28/24  AF [place]: 11%  Anticoagulant: Eliquis  Ventricular Arrhythmia: None  Setting Changes: Rate response turned ON in device, pacing mode changed to DDDR from DDD, fallback rate during AT/AF episodes 70 bpm.     Plan: Follow patient PRN during hospitalization.   Device follow-up every 3 months.    Lu Conteh RN    Dual lead ICD    I have reviewed and interpreted the device interrogation, settings, programming and nurse's summary. The device is functioning within normal device parameters. I agree with the current findings, assessment and plan.   Results for orders placed or performed in visit on 08/30/24    Cardiac Device Check - Remote     Status: None (In process)   Result Value Ref Range    Date Time Interrogation Session [id number]     Implantable Pulse Generator  [employer]     Implantable Pulse Generator Model D533 RESONATE HF ICD     Implantable Pulse Generator Serial Number [id number]     Type Interrogation Session Remote Device Initiated     Clinic Name [facility]     Implantable Pulse Generator Type Defibrillator     Implantable Pulse Generator Implant Date 20240508     Implantable Lead  [employer]     Implantable Lead Model 0672 [place] 4-Front S     Implantable Lead Serial Number [id number]     Implantable Lead Implant Date 20240508     Implantable Lead Polarity Type Bipolar Lead     Implantable Lead Location Detail 1 UNKNOWN     Implantable Lead Special Function 59cm length     Implantable Lead Location Right Ventricle     Implantable Lead Connection Status Connected     Implantable Lead  [employer]     Implantable Lead Model 7841 Ingevity+ MRI     Implantable Lead Serial Number [id number]     Implantable Lead Implant Date 20240508     Implantable Lead Polarity Type Bipolar Lead     Implantable Lead Location Detail 1 UNKNOWN     Implantable Lead Special Function 52cm length     Implantable Lead Location Right Atrium     Implantable Lead Connection Status Connected     Bharathi Setting Mode (NBG Code) DDD     Bharathi Setting Lower Rate Limit 60 [beats]/min    Bharathi Setting Maximum Tracking Rate 130 [beats]/min    Bharathi Setting Maximum Sensor Rate 130 [beats]/min    Bharathi Setting JUANITO Delay Low 300 ms    Bharathi Setting PAV Delay Low 300 ms    Bharathi Setting PAV Delay High 200 ms    Bharathi Setting JUANITO Delay High 200 ms    Bharathi Setting AT Mode Switch Rate 170 [beats]/min    Bharathi Setting AT Mode Switch Mode VDIR     Lead Channel Setting Sensing Polarity Bipolar     Lead Channel Setting Sensing Sensitivity 0.25 mV    Lead Channel Setting  Sensing Adaptation Mode Adaptive     Lead Channel Setting Sensing Polarity Bipolar     Lead Channel Setting Sensing Sensitivity 0.6 mV    Lead Channel Setting Sensing Adaptation Mode Adaptive     Lead Channel Setting Pacing Polarity Bipolar     Lead Channel Setting Pacing Pulse Width 0.4 ms    Lead Channel Setting Pacing Amplitude 2.0 V    Lead Channel Setting Pacing Capture Mode Adaptive     Lead Channel Setting Pacing Polarity Bipolar     Lead Channel Setting Pacing Pulse Width 0.4 ms    Lead Channel Setting Pacing Amplitude 2.0 V    Lead Channel Setting Pacing Capture Mode Adaptive     Zone Setting Type Category VF     Zone Setting Vendor Type Category VF     Zone Setting Status Active     Zone Setting Detection Interval 250 ms    Zone Setting Type Category VT     Zone Setting Vendor Type Category VT     Zone Setting Status Active     Zone Setting Detection Interval 300 ms    Zone Setting Type Category VT     Zone Setting Vendor Type Category VT-1     Zone Setting Status Monitor     Zone Setting Detection Interval 353 ms    Lead Channel Impedance Value 570 ohm    Lead Channel Impedance Value 318 ohm    Lead Channel Pacing Threshold Amplitude 0.7 V    Lead Channel Pacing Threshold Pulse Width 0.4 ms    Battery Date Time of Measurements 76155430701943     Battery Status Beginning of Service     Battery Remaining Longevity 156 mo    Battery Remaining Percentage 100 %    Capacitor Charge Type Reformation     Capacitor Last Charge Date Time 63831267288953     Capacitor Charge Time 9.4 s    Bharathi Statistic Date Time Start 20240808000000     Bharathi Statistic Date Time End 20240830000000     Bharathi Statistic RA Percent Paced 36 %    Bharathi Statistic RV Percent Paced 3 %    Atrial Tachy Statistic Date Time Start 20240809000000     Atrial Tachy Statistic Date Time End 20240829000000     Atrial Tachy Statistic AT/AF [place] Percent 7 %    Therapy Statistic Recent Shocks Delivered 0     Therapy Statistic Recent Shocks Aborted 0      Therapy Statistic Recent ATP Delivered 0     Therapy Statistic Recent Date Time Start 07231586925615     Therapy Statistic Recent Date Time End 13744690840725     Therapy Statistic Total Shocks Delivered 0     Therapy Statistic Total Shocks Aborted 0     Therapy Statistic Total ATP Delivered 0     Therapy Statistic Total  Date Time Start 78353455854550     Therapy Statistic Total  Date Time End 59550440658000     Episode Statistic Recent Count 0     Episode Statistic Type Category Other     Episode Statistic Recent Count 0     Episode Statistic Type Category VT     Episode Statistic Vendor Type Category NSVT     Episode Statistic Recent Count 0     Episode Statistic Type Category VT     Episode Statistic Vendor Type Category VT     Episode Statistic Recent Count 0     Episode Statistic Type Category VT     Episode Statistic Vendor Type Category VT-1     Episode Statistic Recent Date Time Start 26535845455999     Episode Statistic Recent Date Time End 32178618338796     Episode Statistic Recent Date Time Start 10980443412994     Episode Statistic Recent Date Time End 13069024860299     Episode Statistic Recent Date Time Start 47895493517792     Episode Statistic Recent Date Time End 93464928741515     Episode Statistic Recent Date Time Start 22870822969842     Episode Statistic Recent Date Time End 30203043401964     Episode Identifier [email or web address]     Episode Type Category Periodic EGM     Episode Date Time [id number]     Episode Identifier [email or web address]     Episode Type Category Other     Episode Date Time [id number]     Episode Identifier ATR-11     Episode Type Category AT/AF     Episode Date Time [id number]     Episode Identifier ATR-10     Episode Type Category AT/AF     Episode Date Time [id number]     Episode Duration 41 s    Episode Identifier ATR-9     Episode Type Category AT/AF     Episode Date Time [id number]     Episode Duration 1 s    Episode Identifier ATR-8     Episode Type Category AT/AF      Episode Date Time [id number]     Episode Duration 8 s    Episode Identifier ATR-7     Episode Type Category AT/AF     Episode Date Time [id number]     Episode Duration 1 s    Episode Identifier ATR-6     Episode Type Category AT/AF     Episode Date Time [id number]     Episode Duration 5 s    Episode Identifier ATR-5     Episode Type Category AT/AF     Episode Date Time [id number]     Episode Duration 2 s    Episode Identifier ATR-4     Episode Type Category AT/AF     Episode Date Time [id number]     Episode Duration 5 s    Episode Identifier ATR-3     Episode Type Category AT/AF     Episode Date Time [id number]     Episode Duration 3 s    Episode Identifier ATR-2     Episode Type Category AT/AF     Episode Date Time [id number]     Episode Duration 3 s    Episode Identifier RAAT-127     Episode Type Category Other     Episode Date Time [id number]     Episode Identifier ATR-1     Episode Type Category AT/AF     Episode Date Time [id number]     Episode Identifier [id number]-76740     Episode Type Category Other     Episode Date Time [id number]     Episode Duration 51 s    Episode Identifier RYTHMIQ-60938     Episode Type Category Other     Episode Date Time [id number]     Episode Duration 52 s    Episode Identifier RYTHMIQ-11920     Episode Type Category Other     Episode Date Time [id number]     Episode Duration 50 s    Episode Identifier RYTHMIQ-20584     Episode Type Category Other     Episode Date Time [id number]     Episode Duration 58 s    Episode Identifier RYTHMIQ-42509     Episode Type Category Other     Episode Date Time [id number]     Episode Duration 57 s    Episode Identifier RYTHMIQ-53858     Episode Type Category Other     Episode Date Time [id number]     Episode Duration 58 s    Episode Identifier RYTHMIQ-83354     Episode Type Category Other     Episode Date Time [id number]     Episode Duration 58 s    Episode Identifier RYTHMIQ-71824      Episode Type Category Other     Episode Date Time [id number]     Episode Duration 58 s    Episode Identifier RYTHMIQ-92754     Episode Type Category Other     Episode Date Time [id number]     Episode Duration 57 s    Episode Identifier RYTHMIQ-46206     Episode Type Category Other     Episode Date Time [id number]     Episode Duration 58 s    Episode Identifier APM-20     Episode Type Category Periodic EGM     Episode Date Time [id number]     Episode Identifier RAAT-122     Episode Type Category Other     Episode Date Time [id number]     Episode Identifier RVAT-122     Episode Type Category Other     Episode Date Time [id number]     Episode Identifier RYTHMIQ-07175     Episode Type Category Other     Episode Date Time [id number]     Episode Duration 51 s    Episode Identifier RYTHMIQ-52772     Episode Type Category Other     Episode Date Time [id number]     Episode Duration 56 s    Episode Identifier RYTHMIQ-08931     Episode Type Category Other     Episode Date Time [id number]     Episode Duration 55 s    Episode Identifier RYTHMIQ-02447     Episode Type Category Other     Episode Date Time [id number]     Episode Duration 55 s    Episode Identifier RYTHMIQ-93223     Episode Type Category Other     Episode Date Time [id number]     Episode Duration 55 s    Episode Identifier RYTHMIQ-59760     Episode Type Category Other     Episode Date Time [id number]     Episode Duration 54 s    Episode Identifier RYTHMIQ-33293     Episode Type Category Other     Episode Date Time [id number]     Episode Duration 53 s    Episode Identifier RYTHMIQ-40548     Episode Type Category Other     Episode Date Time [id number]     Episode Duration 52 s    Episode Identifier RYTHMIQ-08848     Episode Type Category Other     Episode Date Time [id number]     Episode Duration 57 s    Episode Identifier RYTHMIQ-16718     Episode Type Category Other     Episode Date Time [id number]      Episode Duration 53 s    Episode Identifier APM-18     Episode Type Category Periodic EGM     Episode Date Time [id number]     Episode Identifier RYTHMIQ-24814     Episode Type Category Other     Episode Date Time [id number]     Episode Duration 57 s    [facility]     Episode Type Category Other     Episode Date Time [id number]     Episode Duration 58 s    Episode Identifier RYTHMIQ-91271     Episode Type Category Other     Episode Date Time [id number]     Episode Duration 58 s    Episode Identifier RYMIQ-17990     Episode Type Category Other     Episode Date Time [id number]     Episode Duration 58 s    Episode Identifier RYMIQ-28822     Episode Type Category Other     Episode Date Time [id number]     Episode Duration 58 s    Episode Identifier RAAT-114     Episode Type Category Other     Episode Date Time [id number]     Episode Identifier RVAT-114     Episode Type Category Other     Episode Date Time [id number]     Episode Identifier RYMIQ-50627     Episode Type Category Other     Episode Date Time [id number]     Episode Duration 58 s    Episode Identifier RYMIQ-18251     Episode Type Category Other     Episode Date Time [id number]     Episode Duration 58 s    Episode Identifier RYMIQ-15091     Episode Type Category Other     Episode Date Time [id number]     Episode Duration 103 s    Episode Identifier RYTHMIQ-69369     Episode Type Category Other     Episode Date Time [id number]     Episode Duration 55 s    [facility]     Episode Type Category Other     Episode Date Time [id number]     Episode Duration 55 s    Episode Identifier APM-17     Episode Type Category Periodic EGM     Episode Date Time [id number]     Episode Identifier RYTHMIQ-66976     Episode Type Category Other     Episode Date Time [id number]     Episode Duration 98 s    Episode Identifier RYTHMIQ-22236     Episode Type Category Other      Episode Date Time [id number]     Episode Duration 58 s    Episode Identifier RYTHMIQ-43287     Episode Type Category Other     Episode Date Time 20240809035700     Episode Duration 58 s    Episode Identifier RYTHMIQ-71460     Episode Type Category Other     Episode Date Time 20240809035100     Episode Duration 58 s    Episode Identifier RYTHMIQ-94130     Episode Type Category Other     Episode Date Time [id number]     Episode Duration 59 s    Episode Identifier RYSOMMIQ-86300     Episode Type Category Other     Episode Date Time 20240809025600     Episode Duration 58 s    Episode Identifier RYSOMMIQ-39685     Episode Type Category Other     Episode Date Time 20240809022500     Episode Duration 58 s    Episode Identifier RAAT-106     Episode Type Category Other     Episode Date Time 20240809022200     Episode Identifier RYTHMIQ-09779     Episode Type Category Other     Episode Date Time 20240809021800     Episode Duration 58 s    Episode Identifier RVAT-106     Episode Type Category Other     Episode Date Time 20240809021700     Episode Identifier RYTHMIQ-09752     Episode Type Category Other     Episode Date Time 20240809020400     Episode Duration 57 s    Episode Identifier RYSOMMIQ-36532     Episode Type Category Other     Episode Date Time 20240809020000     Episode Duration 58 s    Narrative    Remote ICD transmission received and reviewed. Device transmission sent per MD orders.     Device: [employer] D533 RESONATE HF ICD  Normal Device Function  Mode: DDD  bpm  AP: 36%  : 3%  Presenting EGM: Afib w/ VS 70-95 bpm  Thoracic Impedance: Below reference line, suggesting possible intrathoracic fluid accumulation.  Also worsening respiratory rate.  Lead Trends Appear Stable.  Estimated battery longevity to RRT = 13 years.   Atrial Arrhythmia: AF episode began on 8/28/24  AF [place]: 7%  Anticoagulant: Eliquis  Ventricular Arrhythmia: None  Pt Notified of Transmission Results: Patient was called to  see how he is feeling, he has been in the ED twice in the past week for severe fatigue.  According to his device diagnostics, it appears he has worsening heart failure.  His heart rate histograms also show that he may benefit from having rate response turned ON.     Plan: Results will be sent to [facility].  Device follow-up every 3 months.  Lu Conteh RN    Remote ICD Transmission    I have reviewed and interpreted the device interrogation, settings, programming and nurse's summary. The device is functioning within normal device parameters. I agree with the current findings, assessment and plan.     Medications - No data to display  Labs Ordered and Resulted from Time of ED Arrival to Time of ED Departure   INR - Abnormal       Result Value    INR 1.24 (*)    COMPREHENSIVE METABOLIC PANEL - Abnormal    Sodium 136      Potassium 4.3      Carbon Dioxide (CO2) 24      Anion Gap 8      Urea Nitrogen 21.9      Creatinine 1.28 (*)     GFR Estimate 59 (*)     Calcium 8.7 (*)     Chloride 104      Glucose 100 (*)     Alkaline Phosphatase 113      AST 43      ALT 39      Protein Total 6.6      Albumin 3.2 (*)     Bilirubin Total 0.4     TROPONIN T, HIGH SENSITIVITY - Abnormal    Troponin T, High Sensitivity 24 (*)    NT PROBNP INPATIENT - Abnormal    N terminal Pro BNP Inpatient 17,448 (*)    CBC WITH PLATELETS AND DIFFERENTIAL - Abnormal    WBC Count 9.8      RBC Count 3.26 (*)     Hemoglobin 10.2 (*)     Hematocrit 31.5 (*)     MCV 97      MCH 31.3      MCHC 32.4      RDW 15.8 (*)     Platelet Count 256      % Neutrophils 90      % Lymphocytes 6      % Monocytes 2      % Eosinophils 1      % Basophils 0      % Immature Granulocytes 1      NRBCs per 100 WBC 0      Absolute Neutrophils 8.8 (*)     Absolute Lymphocytes 0.6 (*)     Absolute Monocytes 0.2      Absolute Eosinophils 0.1      Absolute Basophils 0.0      Absolute Immature Granulocytes 0.1      Absolute NRBCs 0.0     MAGNESIUM - Normal    Magnesium 2.2        XR Chest 2 Views   Final Result   Impression:    1. Stable mild cardiomegaly with implantable cardiac defibrillator and   coronary stenting.   2. Small left pleural effusion.      I have personally reviewed the examination and initial interpretation   and I agree with the findings.      PANCHITO EID MD            SYSTEM ID:  [id number]             Critical care was not performed.     Medical Decision Making  The patient's presentation was of high complexity (a chronic illness severe exacerbation, progression, or side effect of treatment).    The patient's evaluation involved:  ordering and/or review of 3+ test(s) in this encounter (see separate area of note for details)    The patient's management necessitated high risk (a decision regarding hospitalization).    Assessment & Plan    Acute on chronic systolic CHF, not hypoxic and not much leg edema but very elevated BNP from baseline, weakness and soft BP, EKG and CXR without acute change, D/W Cards 2-admit as planned.    I have reviewed the nursing notes. I have reviewed the findings, diagnosis, plan and need for follow up with the patient.    New Prescriptions    No medications on file       Final diagnoses:   Acute on chronic systolic congestive heart failure (H)       IRao, am serving as a trained medical scribe to document services personally performed by Brian George MD based on the provider's statements to me on August 30, 2024.  This document has been checked and approved by the attending provider.    Brian CURIEL MD, was physically present and have reviewed and verified the accuracy of this note documented by Rao Zhou medical scribe.      Brian George MD  [facility] EMERGENCY DEPARTMENT  8/30/2024     Brian George MD  08/30/24 1745

## 2024-08-30 NOTE — LETTER
Mechanical Circulatory Support Program  [place] [address], [facility] [address] [place] [address]  [phone] Office Phone  [phone] Fax Number    NOTIFICATION OF SPECIAL MEDICAL EQUIPMENT  To Whom it May Concern,    I attest to the existence of life sustaining equipment at the home of the below individual. I recommend that power never be interrupted from the below residence and that special attention be given to this customer in the event of disruption of power service. Lack of power could result in patient injury or death. If additional information or paperwork is needed, please contact the Ventricular Assist Device (VAD) office: phone [phone], fax [phone].     Name of Patient:   Duane C Johnson    Address:   [address] [address] [place] [address]    Type of medical equipment:  Ventricular Assist Device  Length of time equipment will be necessary: Indefinitely   Is this equipment life supporting?   Yes   Physician Name:  Ham Cruz MD     Thank you for your consideration in this important matter,    Dr. Cruz        For the purpose of the form and its possible verification, I hereby authorize release of medical information to [facility] or its representative.     Signature of patient:         ______________________________________________Date:__________

## 2024-08-30 NOTE — LETTER
Mechanical Circulatory Support Program  [place] [address], [facility] [address] [place] [address]  [phone] Office Phone  [phone] Fax Number    NOTIFICATION OF SPECIAL MEDICAL EQUIPMENT  To Whom it May Concern,    I attest to the existence of life sustaining equipment at the home of the below individual. I recommend that power never be interrupted from the below residence and that special attention be given to this customer in the event of disruption of power service. Lack of power could result in patient injury or death. If additional information or paperwork is needed, please contact the Ventricular Assist Device (VAD) office: phone [phone], fax [phone].     Name of Patient:   Duane C Johnson    Address:   [address] [address] [place] [address]    Type of medical equipment:  Ventricular Assist Device  Length of time equipment will be necessary: Indefinitely   Is this equipment life supporting?   Yes   Physician Name:  Ham Cruz MD     Thank you for your consideration in this important matter,    Dr. Cruz    For the purpose of the form and its possible verification, I hereby authorize release of medical information to [facility] or its representative.     Signature of patient:         ______________________________________________Date:__________

## 2024-08-31 ENCOUNTER — APPOINTMENT (OUTPATIENT)
Dept: CARDIOLOGY | Facility: CLINIC | Age: 74
DRG: 001 | End: 2024-08-31
Payer: MEDICARE

## 2024-08-31 LAB
ANION GAP SERPL CALCULATED.3IONS-SCNC: 10 MMOL/L (ref 7–15)
BASOPHILS # BLD AUTO: 0 10E3/UL (ref 0–0.2)
BASOPHILS NFR BLD AUTO: 0 %
BUN SERPL-MCNC: 23.8 MG/DL (ref 8–23)
CALCIUM SERPL-MCNC: 8.4 MG/DL (ref 8.8–10.4)
CHLORIDE SERPL-SCNC: 104 MMOL/L (ref 98–107)
CHOLEST SERPL-MCNC: 83 MG/DL
CREAT SERPL-MCNC: 1.28 MG/DL (ref 0.67–1.17)
EGFRCR SERPLBLD CKD-EPI 2021: 59 ML/MIN/1.73M2
EOSINOPHIL # BLD AUTO: 0.1 10E3/UL (ref 0–0.7)
EOSINOPHIL NFR BLD AUTO: 1 %
ERYTHROCYTE [DISTWIDTH] IN BLOOD BY AUTOMATED COUNT: 15.9 % (ref 10–15)
GLUCOSE SERPL-MCNC: 116 MG/DL (ref 70–99)
HCO3 SERPL-SCNC: 21 MMOL/L (ref 22–29)
HCT VFR BLD AUTO: 30.2 % (ref 40–53)
HDLC SERPL-MCNC: 25 MG/DL
HGB BLD-MCNC: 9.8 G/DL (ref 13.3–17.7)
IMM GRANULOCYTES # BLD: 0.1 10E3/UL
IMM GRANULOCYTES NFR BLD: 1 %
INR PPP: 1.26 (ref 0.85–1.15)
LDLC SERPL CALC-MCNC: 46 MG/DL
LVEF ECHO: NORMAL
LYMPHOCYTES # BLD AUTO: 0.4 10E3/UL (ref 0.8–5.3)
LYMPHOCYTES NFR BLD AUTO: 4 %
MAGNESIUM SERPL-MCNC: 1.9 MG/DL (ref 1.7–2.3)
MCH RBC QN AUTO: 30.6 PG (ref 26.5–33)
MCHC RBC AUTO-ENTMCNC: 32.5 G/DL (ref 31.5–36.5)
MCV RBC AUTO: 94 FL (ref 78–100)
MDC_IDC_EPISODE_DTM: NORMAL
MDC_IDC_EPISODE_DURATION: 1 S
MDC_IDC_EPISODE_DURATION: 1 S
MDC_IDC_EPISODE_DURATION: 103 S
MDC_IDC_EPISODE_DURATION: 2 S
MDC_IDC_EPISODE_DURATION: 3 S
MDC_IDC_EPISODE_DURATION: 3 S
MDC_IDC_EPISODE_DURATION: 41 S
MDC_IDC_EPISODE_DURATION: 5 S
MDC_IDC_EPISODE_DURATION: 5 S
MDC_IDC_EPISODE_DURATION: 50 S
MDC_IDC_EPISODE_DURATION: 51 S
MDC_IDC_EPISODE_DURATION: 51 S
MDC_IDC_EPISODE_DURATION: 52 S
MDC_IDC_EPISODE_DURATION: 52 S
MDC_IDC_EPISODE_DURATION: 53 S
MDC_IDC_EPISODE_DURATION: 53 S
MDC_IDC_EPISODE_DURATION: 54 S
MDC_IDC_EPISODE_DURATION: 55 S
MDC_IDC_EPISODE_DURATION: 56 S
MDC_IDC_EPISODE_DURATION: 57 S
MDC_IDC_EPISODE_DURATION: 58 S
MDC_IDC_EPISODE_DURATION: 59 S
MDC_IDC_EPISODE_DURATION: 8 S
MDC_IDC_EPISODE_DURATION: 98 S
MDC_IDC_EPISODE_ID: NORMAL
MDC_IDC_EPISODE_TYPE: NORMAL
MDC_IDC_LEAD_CONNECTION_STATUS: NORMAL
MDC_IDC_LEAD_IMPLANT_DT: NORMAL
MDC_IDC_LEAD_LOCATION: NORMAL
MDC_IDC_LEAD_LOCATION_DETAIL_1: NORMAL
MDC_IDC_LEAD_MFG: NORMAL
MDC_IDC_LEAD_MODEL: NORMAL
MDC_IDC_LEAD_POLARITY_TYPE: NORMAL
MDC_IDC_LEAD_SERIAL: NORMAL
MDC_IDC_LEAD_SPECIAL_FUNCTION: NORMAL
MDC_IDC_MSMT_BATTERY_DTM: NORMAL
MDC_IDC_MSMT_BATTERY_DTM: NORMAL
MDC_IDC_MSMT_BATTERY_REMAINING_LONGEVITY: 150 MO
MDC_IDC_MSMT_BATTERY_REMAINING_LONGEVITY: 156 MO
MDC_IDC_MSMT_BATTERY_REMAINING_PERCENTAGE: 100 %
MDC_IDC_MSMT_BATTERY_REMAINING_PERCENTAGE: 100 %
MDC_IDC_MSMT_BATTERY_STATUS: NORMAL
MDC_IDC_MSMT_BATTERY_STATUS: NORMAL
MDC_IDC_MSMT_CAP_CHARGE_DTM: NORMAL
MDC_IDC_MSMT_CAP_CHARGE_DTM: NORMAL
MDC_IDC_MSMT_CAP_CHARGE_TIME: 9.4 S
MDC_IDC_MSMT_CAP_CHARGE_TIME: 9.4 S
MDC_IDC_MSMT_CAP_CHARGE_TYPE: NORMAL
MDC_IDC_MSMT_CAP_CHARGE_TYPE: NORMAL
MDC_IDC_MSMT_LEADCHNL_RA_IMPEDANCE_VALUE: 570 OHM
MDC_IDC_MSMT_LEADCHNL_RA_IMPEDANCE_VALUE: 604 OHM
MDC_IDC_MSMT_LEADCHNL_RV_IMPEDANCE_VALUE: 318 OHM
MDC_IDC_MSMT_LEADCHNL_RV_IMPEDANCE_VALUE: 320 OHM
MDC_IDC_MSMT_LEADCHNL_RV_PACING_THRESHOLD_AMPLITUDE: 0.7 V
MDC_IDC_MSMT_LEADCHNL_RV_PACING_THRESHOLD_AMPLITUDE: 0.7 V
MDC_IDC_MSMT_LEADCHNL_RV_PACING_THRESHOLD_PULSEWIDTH: 0.4 MS
MDC_IDC_MSMT_LEADCHNL_RV_PACING_THRESHOLD_PULSEWIDTH: 0.4 MS
MDC_IDC_PG_IMPLANT_DTM: NORMAL
MDC_IDC_PG_IMPLANT_DTM: NORMAL
MDC_IDC_PG_MFG: NORMAL
MDC_IDC_PG_MFG: NORMAL
MDC_IDC_PG_MODEL: NORMAL
MDC_IDC_PG_MODEL: NORMAL
MDC_IDC_PG_SERIAL: NORMAL
MDC_IDC_PG_SERIAL: NORMAL
MDC_IDC_PG_TYPE: NORMAL
MDC_IDC_PG_TYPE: NORMAL
MDC_IDC_SESS_CLINIC_NAME: NORMAL
MDC_IDC_SESS_CLINIC_NAME: NORMAL
MDC_IDC_SESS_DTM: NORMAL
MDC_IDC_SESS_DTM: NORMAL
MDC_IDC_SESS_TYPE: NORMAL
MDC_IDC_SESS_TYPE: NORMAL
MDC_IDC_SET_BRADY_AT_MODE_SWITCH_MODE: NORMAL
MDC_IDC_SET_BRADY_AT_MODE_SWITCH_MODE: NORMAL
MDC_IDC_SET_BRADY_AT_MODE_SWITCH_RATE: 170 {BEATS}/MIN
MDC_IDC_SET_BRADY_AT_MODE_SWITCH_RATE: 170 {BEATS}/MIN
MDC_IDC_SET_BRADY_LOWRATE: 60 {BEATS}/MIN
MDC_IDC_SET_BRADY_LOWRATE: 60 {BEATS}/MIN
MDC_IDC_SET_BRADY_MAX_SENSOR_RATE: 130 {BEATS}/MIN
MDC_IDC_SET_BRADY_MAX_SENSOR_RATE: 130 {BEATS}/MIN
MDC_IDC_SET_BRADY_MAX_TRACKING_RATE: 130 {BEATS}/MIN
MDC_IDC_SET_BRADY_MAX_TRACKING_RATE: 130 {BEATS}/MIN
MDC_IDC_SET_BRADY_MODE: NORMAL
MDC_IDC_SET_BRADY_MODE: NORMAL
MDC_IDC_SET_BRADY_PAV_DELAY_HIGH: 200 MS
MDC_IDC_SET_BRADY_PAV_DELAY_HIGH: 200 MS
MDC_IDC_SET_BRADY_PAV_DELAY_LOW: 300 MS
MDC_IDC_SET_BRADY_PAV_DELAY_LOW: 300 MS
MDC_IDC_SET_BRADY_SAV_DELAY_HIGH: 200 MS
MDC_IDC_SET_BRADY_SAV_DELAY_HIGH: 200 MS
MDC_IDC_SET_BRADY_SAV_DELAY_LOW: 300 MS
MDC_IDC_SET_BRADY_SAV_DELAY_LOW: 300 MS
MDC_IDC_SET_LEADCHNL_RA_PACING_AMPLITUDE: 2 V
MDC_IDC_SET_LEADCHNL_RA_PACING_AMPLITUDE: 2 V
MDC_IDC_SET_LEADCHNL_RA_PACING_CAPTURE_MODE: NORMAL
MDC_IDC_SET_LEADCHNL_RA_PACING_CAPTURE_MODE: NORMAL
MDC_IDC_SET_LEADCHNL_RA_PACING_POLARITY: NORMAL
MDC_IDC_SET_LEADCHNL_RA_PACING_POLARITY: NORMAL
MDC_IDC_SET_LEADCHNL_RA_PACING_PULSEWIDTH: 0.4 MS
MDC_IDC_SET_LEADCHNL_RA_PACING_PULSEWIDTH: 0.4 MS
MDC_IDC_SET_LEADCHNL_RA_SENSING_ADAPTATION_MODE: NORMAL
MDC_IDC_SET_LEADCHNL_RA_SENSING_ADAPTATION_MODE: NORMAL
MDC_IDC_SET_LEADCHNL_RA_SENSING_POLARITY: NORMAL
MDC_IDC_SET_LEADCHNL_RA_SENSING_POLARITY: NORMAL
MDC_IDC_SET_LEADCHNL_RA_SENSING_SENSITIVITY: 0.25 MV
MDC_IDC_SET_LEADCHNL_RA_SENSING_SENSITIVITY: 0.25 MV
MDC_IDC_SET_LEADCHNL_RV_PACING_AMPLITUDE: 2 V
MDC_IDC_SET_LEADCHNL_RV_PACING_AMPLITUDE: 2 V
MDC_IDC_SET_LEADCHNL_RV_PACING_CAPTURE_MODE: NORMAL
MDC_IDC_SET_LEADCHNL_RV_PACING_CAPTURE_MODE: NORMAL
MDC_IDC_SET_LEADCHNL_RV_PACING_POLARITY: NORMAL
MDC_IDC_SET_LEADCHNL_RV_PACING_POLARITY: NORMAL
MDC_IDC_SET_LEADCHNL_RV_PACING_PULSEWIDTH: 0.4 MS
MDC_IDC_SET_LEADCHNL_RV_PACING_PULSEWIDTH: 0.4 MS
MDC_IDC_SET_LEADCHNL_RV_SENSING_ADAPTATION_MODE: NORMAL
MDC_IDC_SET_LEADCHNL_RV_SENSING_ADAPTATION_MODE: NORMAL
MDC_IDC_SET_LEADCHNL_RV_SENSING_POLARITY: NORMAL
MDC_IDC_SET_LEADCHNL_RV_SENSING_POLARITY: NORMAL
MDC_IDC_SET_LEADCHNL_RV_SENSING_SENSITIVITY: 0.6 MV
MDC_IDC_SET_LEADCHNL_RV_SENSING_SENSITIVITY: 0.6 MV
MDC_IDC_SET_ZONE_DETECTION_INTERVAL: 250 MS
MDC_IDC_SET_ZONE_DETECTION_INTERVAL: 250 MS
MDC_IDC_SET_ZONE_DETECTION_INTERVAL: 300 MS
MDC_IDC_SET_ZONE_DETECTION_INTERVAL: 300 MS
MDC_IDC_SET_ZONE_DETECTION_INTERVAL: 353 MS
MDC_IDC_SET_ZONE_DETECTION_INTERVAL: 353 MS
MDC_IDC_SET_ZONE_STATUS: NORMAL
MDC_IDC_SET_ZONE_TYPE: NORMAL
MDC_IDC_SET_ZONE_VENDOR_TYPE: NORMAL
MDC_IDC_STAT_AT_BURDEN_PERCENT: 11 %
MDC_IDC_STAT_AT_BURDEN_PERCENT: 7 %
MDC_IDC_STAT_AT_DTM_END: NORMAL
MDC_IDC_STAT_AT_DTM_END: NORMAL
MDC_IDC_STAT_AT_DTM_START: NORMAL
MDC_IDC_STAT_AT_DTM_START: NORMAL
MDC_IDC_STAT_BRADY_DTM_END: NORMAL
MDC_IDC_STAT_BRADY_DTM_END: NORMAL
MDC_IDC_STAT_BRADY_DTM_START: NORMAL
MDC_IDC_STAT_BRADY_DTM_START: NORMAL
MDC_IDC_STAT_BRADY_RA_PERCENT_PACED: 33 %
MDC_IDC_STAT_BRADY_RA_PERCENT_PACED: 36 %
MDC_IDC_STAT_BRADY_RV_PERCENT_PACED: 3 %
MDC_IDC_STAT_BRADY_RV_PERCENT_PACED: 3 %
MDC_IDC_STAT_EPISODE_RECENT_COUNT: 0
MDC_IDC_STAT_EPISODE_RECENT_COUNT_DTM_END: NORMAL
MDC_IDC_STAT_EPISODE_RECENT_COUNT_DTM_START: NORMAL
MDC_IDC_STAT_EPISODE_TYPE: NORMAL
MDC_IDC_STAT_EPISODE_VENDOR_TYPE: NORMAL
MDC_IDC_STAT_TACHYTHERAPY_ATP_DELIVERED_RECENT: 0
MDC_IDC_STAT_TACHYTHERAPY_ATP_DELIVERED_RECENT: 0
MDC_IDC_STAT_TACHYTHERAPY_ATP_DELIVERED_TOTAL: 0
MDC_IDC_STAT_TACHYTHERAPY_ATP_DELIVERED_TOTAL: 0
MDC_IDC_STAT_TACHYTHERAPY_RECENT_DTM_END: NORMAL
MDC_IDC_STAT_TACHYTHERAPY_RECENT_DTM_END: NORMAL
MDC_IDC_STAT_TACHYTHERAPY_RECENT_DTM_START: NORMAL
MDC_IDC_STAT_TACHYTHERAPY_RECENT_DTM_START: NORMAL
MDC_IDC_STAT_TACHYTHERAPY_SHOCKS_ABORTED_RECENT: 0
MDC_IDC_STAT_TACHYTHERAPY_SHOCKS_ABORTED_RECENT: 0
MDC_IDC_STAT_TACHYTHERAPY_SHOCKS_ABORTED_TOTAL: 0
MDC_IDC_STAT_TACHYTHERAPY_SHOCKS_ABORTED_TOTAL: 0
MDC_IDC_STAT_TACHYTHERAPY_SHOCKS_DELIVERED_RECENT: 0
MDC_IDC_STAT_TACHYTHERAPY_SHOCKS_DELIVERED_RECENT: 0
MDC_IDC_STAT_TACHYTHERAPY_SHOCKS_DELIVERED_TOTAL: 0
MDC_IDC_STAT_TACHYTHERAPY_SHOCKS_DELIVERED_TOTAL: 0
MDC_IDC_STAT_TACHYTHERAPY_TOTAL_DTM_END: NORMAL
MDC_IDC_STAT_TACHYTHERAPY_TOTAL_DTM_END: NORMAL
MDC_IDC_STAT_TACHYTHERAPY_TOTAL_DTM_START: NORMAL
MDC_IDC_STAT_TACHYTHERAPY_TOTAL_DTM_START: NORMAL
MONOCYTES # BLD AUTO: 0.2 10E3/UL (ref 0–1.3)
MONOCYTES NFR BLD AUTO: 2 %
NEUTROPHILS # BLD AUTO: 9.7 10E3/UL (ref 1.6–8.3)
NEUTROPHILS NFR BLD AUTO: 92 %
NONHDLC SERPL-MCNC: 58 MG/DL
NRBC # BLD AUTO: 0 10E3/UL
NRBC BLD AUTO-RTO: 0 /100
PLATELET # BLD AUTO: 288 10E3/UL (ref 150–450)
POTASSIUM SERPL-SCNC: 4.6 MMOL/L (ref 3.4–5.3)
RBC # BLD AUTO: 3.2 10E6/UL (ref 4.4–5.9)
SODIUM SERPL-SCNC: 135 MMOL/L (ref 135–145)
TRIGL SERPL-MCNC: 59 MG/DL
UFH PPP CHRO-ACNC: 0.17 IU/ML
WBC # BLD AUTO: 10.6 10E3/UL (ref 4–11)

## 2024-08-31 PROCEDURE — 255N000002 HC RX 255 OP 636: Performed by: INTERNAL MEDICINE

## 2024-08-31 PROCEDURE — 85610 PROTHROMBIN TIME: CPT

## 2024-08-31 PROCEDURE — 85025 COMPLETE CBC W/AUTO DIFF WBC: CPT

## 2024-08-31 PROCEDURE — 99233 SBSQ HOSP IP/OBS HIGH 50: CPT | Mod: 25 | Performed by: INTERNAL MEDICINE

## 2024-08-31 PROCEDURE — 80048 BASIC METABOLIC PNL TOTAL CA: CPT

## 2024-08-31 PROCEDURE — 36415 COLL VENOUS BLD VENIPUNCTURE: CPT

## 2024-08-31 PROCEDURE — 999N000208 ECHOCARDIOGRAM COMPLETE

## 2024-08-31 PROCEDURE — 250N000011 HC RX IP 250 OP 636

## 2024-08-31 PROCEDURE — 80061 LIPID PANEL: CPT

## 2024-08-31 PROCEDURE — 120N000005 HC R&B MS OVERFLOW UMMC

## 2024-08-31 PROCEDURE — 250N000013 HC RX MED GY IP 250 OP 250 PS 637

## 2024-08-31 PROCEDURE — 82310 ASSAY OF CALCIUM: CPT

## 2024-08-31 PROCEDURE — 83735 ASSAY OF MAGNESIUM: CPT

## 2024-08-31 PROCEDURE — 93306 TTE W/DOPPLER COMPLETE: CPT | Mod: 26 | Performed by: INTERNAL MEDICINE

## 2024-08-31 PROCEDURE — 85520 HEPARIN ASSAY: CPT

## 2024-08-31 RX ORDER — MAGNESIUM SULFATE HEPTAHYDRATE 40 MG/ML
2 INJECTION, SOLUTION INTRAVENOUS ONCE
Status: COMPLETED | OUTPATIENT
Start: 2024-08-31 | End: 2024-08-31

## 2024-08-31 RX ORDER — HEPARIN SODIUM 10000 [USP'U]/100ML
0-5000 INJECTION, SOLUTION INTRAVENOUS CONTINUOUS
Status: DISCONTINUED | OUTPATIENT
Start: 2024-08-31 | End: 2024-09-18

## 2024-08-31 RX ORDER — ACETAMINOPHEN 325 MG/1
975 TABLET ORAL EVERY 6 HOURS PRN
Status: DISCONTINUED | OUTPATIENT
Start: 2024-08-31 | End: 2024-09-05

## 2024-08-31 RX ORDER — FUROSEMIDE 10 MG/ML
10 INJECTION INTRAMUSCULAR; INTRAVENOUS DAILY
Status: DISCONTINUED | OUTPATIENT
Start: 2024-08-31 | End: 2024-09-01

## 2024-08-31 RX ADMIN — FUROSEMIDE 10 MG: 10 INJECTION, SOLUTION INTRAVENOUS at 14:28

## 2024-08-31 RX ADMIN — Medication 1 TABLET: at 07:26

## 2024-08-31 RX ADMIN — HEPARIN SODIUM 800 UNITS/HR: 10000 INJECTION, SOLUTION INTRAVENOUS at 13:31

## 2024-08-31 RX ADMIN — ESCITALOPRAM OXALATE 10 MG: 10 TABLET ORAL at 07:26

## 2024-08-31 RX ADMIN — CLOPIDOGREL BISULFATE 75 MG: 75 TABLET ORAL at 07:27

## 2024-08-31 RX ADMIN — MAGNESIUM SULFATE HEPTAHYDRATE 2 G: 2 INJECTION, SOLUTION INTRAVENOUS at 11:57

## 2024-08-31 RX ADMIN — POTASSIUM CHLORIDE 20 MEQ: 750 TABLET, EXTENDED RELEASE ORAL at 07:27

## 2024-08-31 RX ADMIN — AMIODARONE HYDROCHLORIDE 200 MG: 200 TABLET ORAL at 07:28

## 2024-08-31 RX ADMIN — OXYCODONE HYDROCHLORIDE AND ACETAMINOPHEN 1000 MG: 500 TABLET ORAL at 07:27

## 2024-08-31 RX ADMIN — ACETAMINOPHEN 975 MG: 325 TABLET ORAL at 03:49

## 2024-08-31 RX ADMIN — HUMAN ALBUMIN MICROSPHERES AND PERFLUTREN 6 ML: 10; .22 INJECTION, SOLUTION INTRAVENOUS at 08:33

## 2024-08-31 RX ADMIN — MAGNESIUM OXIDE TAB 400 MG (241.3 MG ELEMENTAL MG) 400 MG: 400 (241.3 MG) TAB at 07:26

## 2024-08-31 RX ADMIN — Medication 1 TABLET: at 19:00

## 2024-08-31 RX ADMIN — ATORVASTATIN CALCIUM 80 MG: 80 TABLET, FILM COATED ORAL at 07:27

## 2024-08-31 ASSESSMENT — ACTIVITIES OF DAILY LIVING (ADL)
ADLS_ACUITY_SCORE: 37

## 2024-08-31 NOTE — PROGRESS NOTES
CLINICAL NUTRITION SERVICES     Acknowledging provider consult: Congestive Heart Failure (CHF) - Dietitian to instruct patient on 2 gram sodium diet     Pt currently boarding in the ED. Will address when pt transfers to inpatient unit.  Per chart review, patient has had HF diet education in the past.     Unit RD will follow  Maia Anton RD/LIOR  Weekend/Holiday: Vocera -> (Weekend Clinical Dietitian)

## 2024-08-31 NOTE — PROGRESS NOTES
[facility]  Cardiology II Service / Advanced Heart Failure  Daily Progress Note    Patient: Duane C Johnson      : 1950      MRN: [id number]    Assessment/Plan:   Duane C Johnson is a 74 year old male pmhx of anterior STEMI s/p PCI to the pLAD on 10/26/23 with a VT/VF arrest the next day (10/27) with patent stents and unchanged anatomy on repeat angiogram. Placed on amiodarone and discharged 23, ICD was not indicated as this was within 48h of his MI. His EF prior to discharge was 20-30% with a large area of akinesis in the LAD, placed on apixaban due to this (Apixaban dcd on 24). Has had multiple admissions for HF exacerbation last year.  Admitted on 24. He presents for 2 weeks of worsening fatigue; found to have BNP of 16k and L pleural effusion.     Today's changes:   -- Started heparin gtt   -- Lasix 10mg IV daily   -- Continue to hold GDMT   -- Plan for RHC and cardiopulmonary stress test (CPX) on 9/3    # Acute heart failure exacerbation  #Possible ambulatory shock  # HFrEf 2/2 ICM (EF 20-30% by TTE 10/23)  #mod-severe MR   #Afib  Fluid status: Hypervolemic  ACEi/ARB/ARNi/afterload reduction: lisinopril 2.5 mg daily, hold for SBP < 90  BB: metoprolol succinate 12.5 mg HS, hold for SBP < 90, HR < 50  Aldosterone antagonist: unable to start d/t hypotension   SGLT2i: Empagliflozin 10mg  SCD prophylaxis: s/p ICD 2024  NSAID use: contraindicated  Antiarrhythmic: Amiodarone 200mg   Diuretic: Was on Bumex 1mg daily; this was changed to PRN on 24 when Empagliflozin was started    Echo from  with EF 15-20% and trace MR. See below for full report.    BP as low as 84/62 (MAP 69), has remained asymptomatic other than fatigue.   Reports having 2L of urine output yesterday after 40mg IV lasix, nothing charted.    - BNP of 16k on admission with L sided pleural effusion   - Holding GDMT while diuresing because of soft pressures.    - Low intensity  heparin gtt    - Lasix 10mg IV daily   - Plan for RHC and CPX on 9/3       # CAD s/p PCI to LAD  # STEMI  # VT/VF arrest  # ICD placed on 5/24  - PTA Plavix, Amio 200, Magnesium 400, Potassium 20  - Mag and Potassium replacement per nursing protocol  - Was on apixaban for low EF (akinesis of the mid-distal anterior,mid anteroseptum, distal septum and the apex) and questionable history of Afib. Since there was no episode of Afib captured apixaban was stopped on 7/5/24.     #DOMENICA  Baseline Cr normal at ~1. Cr of 1.55 on 8/24 with this acute episode of HF. Concern for type 1 cardiorenal syndrome. uPCR negative. Improving.    - Diuresis as above  - avoid nephrotoxins     ================================  Chronic Problems:    #Prostate Cancer  Diagnosed in March 2023. Underwent Leuprolide on 3/19/24 and 6/11/24. Completed radiation treatment on 6/27/24     #L inguinal hernia  Pt reports this has been present for some time. Not causing any pain. Not a surgical candidate with current HF status so will not consult surgery at this time.     Hospital Checklist:  DVT Prophylaxis: Heparin gtt  Code Status: Full Code  Bowel regimen: PRN  Diet/Nutrition: 2L fluid and 2G sodium restriction  Lines: Left PIV    Disposition Planning:  Pending RHC results on 9/3    Staffed w/ Dr. Sloan.    Cristobal Fisher MD  Internal Medicine PGY-1  Cardiology II Service     08/31/2024  ==================================    Subjective:   NAEO. Pt reports urinating 2L yesterday without much oral intake. He reports feeling unchanged from a fatigue standpoint. Continuing to have no significant SOB at rest. Has not gotten up much. Discussed staying until 9/3 for evaluation with a RHC to determine if advanced therapies might be needed. He is comfortable with this plan.     Objective:     Vitals:    08/30/24 1358   Weight: 66 kg (145 lb 8 oz)     Vital Signs with Ranges  Temp:  [99  F (37.2  C)] 99  F (37.2  C)  Pulse:  [] 103  Resp:  [15-19]  19  BP: ()/(60-84) 102/84  SpO2:  [95 %-100 %] 99 %  No intake/output data recorded.    Exam:  General: No acute distress,   HEENT: MMM  Neck: No apparent JVD visualized  CV: Irregularly irregular, no murmurs,   Lungs: On RA, Minimal crackles in L base. Otherwise clear w/o wheezes or crackles, no increased WOB  Abd: Soft, non-tender, non-distended  Ext: Warm and well-perfused, no lower extremity edema  Neuro: Awake, alert, conversational, moving all extremities spontaneously throughout examination    Medications   Current Facility-Administered Medications   Medication Dose Route Frequency Provider Last Rate Last Admin    heparin 25,000 units in 0.45% NaCl 250 mL ANTICOAGULANT infusion  0-5,000 Units/hr Intravenous Continuous Cristobal Fisher MD 8 mL/hr at 08/31/24 1331 800 Units/hr at 08/31/24 1331     Current Facility-Administered Medications   Medication Dose Route Frequency Provider Last Rate Last Admin    amiodarone (PACERONE) tablet 200 mg  200 mg Oral Daily Cristobal Fisher MD   200 mg at 08/31/24 0728    atorvastatin (LIPITOR) tablet 80 mg  80 mg Oral Daily Cristobal Fisher MD   80 mg at 08/31/24 0727    clopidogrel (PLAVIX) tablet 75 mg  75 mg Oral Daily Cristobal Fisher MD   75 mg at 08/31/24 0727    empagliflozin (JARDIANCE) tablet 10 mg  10 mg Oral Daily Cristobal Fisher MD        escitalopram (LEXAPRO) tablet 10 mg  10 mg Oral Daily Cristobal Fisher MD   10 mg at 08/31/24 0726    furosemide (LASIX) injection 10 mg  10 mg Intravenous Daily Cristobal Fisher MD   10 mg at 08/31/24 1428    [Held by provider] lisinopril (ZESTRIL) tablet 2.5 mg  2.5 mg Oral Daily Cristobal Fisher MD        magnesium oxide (MAG-OX) tablet 400 mg  400 mg Oral Daily Cristobal Fisher MD   400 mg at 08/31/24 0726    [Held by provider] metoprolol succinate ER (TOPROL-XL) 24 hr half-tab 12.5 mg  12.5 mg Oral Daily Cristobal Fisher MD        multivitamin w/minerals (THERA-VIT-M) tablet 1 tablet  1 tablet Oral BID Cristobal Fisher MD   1  tablet at 24 0726    potassium chloride tray ER (KLOR-CON M10) CR tablet 20 mEq  20 mEq Oral Daily Cristobal Fisher MD   20 mEq at 24 0727    vitamin C (ASCORBIC ACID) tablet 1,000 mg  1,000 mg Oral Daily Cristobal Fisher MD   1,000 mg at 24 0727       Data   Recent Labs   Lab 24  0414 24  1455 24  1632 24  1112   WBC 10.6 9.8  --  6.4   HGB 9.8* 10.2*  --  11.2*   MCV 94 97  --  94    256  --  165   INR 1.26* 1.24*  --   --     136 135 136   POTASSIUM 4.6 4.3 4.3 4.3   CHLORIDE 104 104 104 104   CO2 * 24 24 24   BUN 23.8* 21.9 21.7 26.1*   CR 1.28* 1.28* 1.21* 1.36*   ANIONGAP 10 8 7 8   PRANAV 8.4* 8.7* 8.5* 8.6*   * 100* 105* 110*   ALBUMIN  --  3.2* 3.1* 3.3*   PROTTOTAL  --  6.6 6.4 6.7   BILITOTAL  --  0.4 0.4 1.1   ALKPHOS  --  113 120 106   ALT  --  39 42 62   AST  --  43 56* 91*       Echocardiogram Complete   Result Value    LVEF  15-20% (severely reduced)    Narrative    [id number]  [id number]  [id number]  [id number]^ROXANA^CRISTOBAL     [facility],[place]  Echocardiography Laboratory  [address] [place] [id number]     Name: JOHNSON, DUANE C  MRN: [id number]  : 1950  Study Date: 2024 08:20 AM  Age: 74 yrs  Gender: Male  Patient Location: [facility]  Reason For Study: CHF  Ordering Physician: CRISTOBAL FISHER  Performed By: Yessenia Cano MEGHANA     BSA: 1.7 m2  Height: 66 in  Weight: 145 lb  HR: 71  BP: 94/73 mmHg  ______________________________________________________________________________  Procedure  Complete Portable Echo Adult. Contrast Optison. Optison (NDC #4735-1670-25)  given intravenously. Patient was given 6 ml mixture of 3 ml Optison and 6 ml  saline. 3 ml wasted.  ______________________________________________________________________________  Interpretation Summary  Left ventricular function is decreased. The ejection fraction is 15-20%  (severely reduced).  Moderate left ventricular dilation is  present.  Apical wall akinesis is present.  Severe diffuse hypokinesis is present.  There is no thrombus seen in the left ventricle.  The right ventricle is normal size.  Global right ventricular function is normal.  Mild functional mitral insufficiency is present.  Mild to moderate tricuspid functional insufficiency is present.  Pulmonary hypertension is present.The right ventricular systolic pressure is  40mmHg above the right atrial pressure.  IVC diameter and respiratory changes fall into an intermediate range  suggesting an RA pressure of 8 mmHg.     This study was compared with the study from 2/19/2024 .MR, TR improved. LVEF  similar in both studies compared side to side. So overall no change in LVEF  ______________________________________________________________________________  Left Ventricle  Left ventricular wall thickness is normal. Moderate left ventricular dilation  is present. Left ventricular diastolic function is not assessable. Left  ventricular function is decreased. The ejection fraction is 15-20% (severely  reduced). Apical wall akinesis is present. Severe diffuse hypokinesis is  present. There is no thrombus seen in the left ventricle.     Right Ventricle  The right ventricle is normal size. Global right ventricular function is  normal. A pacemaker lead is noted in the right ventricle.     Atria  Moderate left atrial enlargement is present. Moderate right atrial enlargement  is present.     Mitral Valve  Mild mitral insufficiency is present.     Aortic Valve  Trileaflet aortic sclerosis without stenosis. On Doppler interrogation, there  is no significant stenosis or regurgitation.     Tricuspid Valve  Mild to moderate tricuspid insufficiency is present. The right ventricular  systolic pressure is approximated at 39.7 mmHg plus the right atrial pressure.  Pulmonary hypertension is present. The right ventricular systolic pressure is  40mmHg above the right atrial pressure.     Pulmonic Valve  On  Doppler interrogation, there is no significant stenosis or regurgitation.     Vessels  The aorta root is normal. IVC diameter and respiratory changes fall into an  intermediate range suggesting an RA pressure of 8 mmHg.     Pericardium  No pericardial effusion is present.     Compared to Previous Study  This study was compared with the study from 2024 . MR, TR improved. LVEF  similar in both studies compared side to side. So overall no change in LVEF.  ______________________________________________________________________________  MMode/2D Measurements & Calculations     IVSd: 0.89 cm  LVIDd: 6.0 cm  LVIDs: 5.5 cm  LVPWd: 0.79 cm  FS: 7.1 %  LV mass(C)d: 197.0 grams  LV mass(C)dI: 112.9 grams/m2  LVOT diam: 2.0 cm  LVOT area: 3.2 cm2  EF Biplane: 16.8 %  LA Volume (BP): 71.3 ml  LA Volume Index (BP): 41.0 ml/m2  RV Base: 4.6 cm  RWT: 0.27     TAPSE: 1.0 cm     Doppler Measurements & Calculations  MV E max ivan: 82.7 cm/sec  LV V1 max P.9 mmHg  LV V1 max: 84.2 cm/sec  LV V1 VTI: 13.8 cm  MR PISA: 4.2 cm2  MR ERO: 0.33 cm2  MR volume: 35.7 ml  SV(LVOT): 43.8 ml  SI(LVOT): 25.1 ml/m2  PA acc time: 0.08 sec  TR max ivan: 315.1 cm/sec  TR max P.7 mmHg  RV S Ivan: 10.7 cm/sec     ______________________________________________________________________________  Report approved by: Jeanine MEJIA 2024 11:05 AM            Examination: XR CHEST 2 VIEWS 2024 3:06 PM     Indication: Shortness of breath     Comparison: Radiograph 2024. CT 2023.     Findings:  PA and lateral views of the chest. Left chest wall implantable cardiac  defibrillator with grossly intact leads/shock coil. Coronary stenting.  Trachea is midline. Stable mild cardiomegaly. Mild blunting of the  left costophrenic angle, likely representing small pleural effusion.  No pneumothorax. No focal consolidation or any definite abnormal  airspace opacities. No acute or suspicious osseous abnormality.  Unremarkable visualized  abdomen.      Impression   Impression:   1. Stable mild cardiomegaly with implantable cardiac defibrillator and  coronary stenting.  2. Small left pleural effusion.     I have personally reviewed the examination and initial interpretation  and I agree with the findings.     PANCHITO EID MD         SYSTEM ID:  [id number]      12/6/2023 CMR  Clinical history: 73 year old with anterior STEMI, cardiac arrest in Oct 2023  Comparison CMR: none  1. The left ventricle is severely enlarged. There is wall thinning and akinesis of the mid-distal anterior,  mid anteroseptum, distal septum and the apex  The global systolic function is severely reduced. The LVEF is 28%.  2. The right ventricle is normal in cavity size. The global systolic function is normal. The RVEF is 52%.   3. The right atrium is normal and the left atrium is severely enlarged.  4. There is mild mitral regurgitation.   5. There is transmural late gadolinium enhancement in mid-distal anterior, mid anteroseptum, distal  septum and the apex consistent with a large infarction in the LAD territory.   6. There is no pericardial effusion.  7. There is no intracardiac thrombus.  8. There are bilateral pleural effusions.  CONCLUSIONS:   Ischemic cardiomyopathy with a large anteroapical infarction.   Severely reduced left ventricular function and normal right ventricular function, LVEF 28% and RVEF 52%.     Cardiac Catheterization:  10/26/23    1st Mrg lesion is 20% stenosed.    Prox LAD to Mid LAD lesion is 100% stenosed.    1st Diag-1 lesion is 80% stenosed.    1st Diag-2 lesion is 50% stenosed.    Dist LAD lesion is 100% stenosed.    RPDA lesion is 70% stenosed.    Dist RCA lesion is 40% stenosed.     Anterior STEMI  Two vessel obstructive CAD (100% pLAD occlusion, 70% rPDA stenosis, 80% diagonal 1 stenosis)  PCI of pLAD to mLAD with BRENDA x 1 (Synergy 2.50x 28 mm) post dilated to 2.75 vessel  CVC placement in RIJ  LVEDP of 26 mmHg  Hemostasis of RRA with TR band       2/19/2024 Echo  Interpretation Summary     1. The left ventricle is moderately dilated. Left ventricular hypertrophy is  noted by two-dimensional echocardiography. Proximal septal thickening is  noted. Left ventricular systolic function is moderate to severely reduced. The  visual ejection fraction is 25-30%. Biplane LVEF is 25%. Diastolic Doppler  findings (E/E' ratio and/or other parameters) suggest left ventricular filling  pressures are increased. There is severe hypokinesis to akinesis of the mid  anterior/anterolateral/anteroseptal/inferoseptal walls and all apical segments  of the left ventricle. There is no thrombus seen in the left ventricle.  2. The right ventricle is normal size. The right ventricular systolic function  is normal.  3. The left atrium is moderately dilated. Right atrial size is normal. There  is no color Doppler evidence of an atrial shunt.  4. There is moderate to mod-severe (2-3+) mitral regurgitation.  5. There is mild to moderate (1-2+) tricuspid regurgitation.Right ventricular  systolic pressure is elevated, consistent with moderate pulmonary  hypertension.  6. No pericardial effusion.  7. In direct comparison to the previous study dated 10/30/2023, there has been  an interval increase in the degree of mitral regurgitation. The other findings  are similar.     RHC 5/6/24  RA: 10/9/6 mmHg  RV: 61/11 mmHg  PA: 63/24/38 mmHg  PCWP: Unable to obtain accurate measurement  CO/CI (Goldy): 3.89/2.3 L/min/m2    PA sat: 64%  MAP: 85 mmHg       Right sided filling pressures are normal.    Mild elevated pulmonary hypertension.    Normal cardiac output level.        Device interrogated on 8/30/24  Device: [email or web address] [id number] RESONATE HF ICD  Normal device function.  Mode: DDDR  bpm  AP: 33%  : 3%  Intrinsic rhythm: Afib w/ VS  bpm  Thoracic Impedance: Below reference line, suggests possible intrathoracic fluid accumulation.  Lead Trends Appear Stable.  Estimated battery  longevity to RRT = 12.5 years.    Atrial Arrhythmia: AF episode began on 8/28/24  AF [place]: 11%  Anticoagulant: Eliquis  Ventricular Arrhythmia: None  Setting Changes: Rate response turned ON in device, pacing mode changed to DDDR from DDD, fallback rate during AT/AF episodes 70 bpm.

## 2024-09-01 ENCOUNTER — APPOINTMENT (OUTPATIENT)
Dept: OCCUPATIONAL THERAPY | Facility: CLINIC | Age: 74
DRG: 001 | End: 2024-09-01
Payer: MEDICARE

## 2024-09-01 LAB
ALBUMIN SERPL BCG-MCNC: 3 G/DL (ref 3.5–5.2)
ALP SERPL-CCNC: 101 U/L (ref 40–150)
ALT SERPL W P-5'-P-CCNC: 31 U/L (ref 0–70)
ANION GAP SERPL CALCULATED.3IONS-SCNC: 9 MMOL/L (ref 7–15)
AST SERPL W P-5'-P-CCNC: 88 U/L (ref 0–45)
BILIRUB SERPL-MCNC: 0.4 MG/DL
BUN SERPL-MCNC: 30.7 MG/DL (ref 8–23)
CALCIUM SERPL-MCNC: 8.4 MG/DL (ref 8.8–10.4)
CHLORIDE SERPL-SCNC: 104 MMOL/L (ref 98–107)
CREAT SERPL-MCNC: 1.21 MG/DL (ref 0.67–1.17)
EGFRCR SERPLBLD CKD-EPI 2021: 63 ML/MIN/1.73M2
GLUCOSE SERPL-MCNC: 95 MG/DL (ref 70–99)
HCO3 SERPL-SCNC: 24 MMOL/L (ref 22–29)
INR PPP: 1.3 (ref 0.85–1.15)
MAGNESIUM SERPL-MCNC: 2.3 MG/DL (ref 1.7–2.3)
POTASSIUM SERPL-SCNC: 4.6 MMOL/L (ref 3.4–5.3)
PROT SERPL-MCNC: 6.5 G/DL (ref 6.4–8.3)
SODIUM SERPL-SCNC: 137 MMOL/L (ref 135–145)
UFH PPP CHRO-ACNC: 0.2 IU/ML
UFH PPP CHRO-ACNC: 0.23 IU/ML
UFH PPP CHRO-ACNC: 0.42 IU/ML
UFH PPP CHRO-ACNC: 0.46 IU/ML

## 2024-09-01 PROCEDURE — 85520 HEPARIN ASSAY: CPT | Performed by: FAMILY MEDICINE

## 2024-09-01 PROCEDURE — 36415 COLL VENOUS BLD VENIPUNCTURE: CPT | Performed by: FAMILY MEDICINE

## 2024-09-01 PROCEDURE — 83735 ASSAY OF MAGNESIUM: CPT

## 2024-09-01 PROCEDURE — 36415 COLL VENOUS BLD VENIPUNCTURE: CPT

## 2024-09-01 PROCEDURE — 250N000013 HC RX MED GY IP 250 OP 250 PS 637

## 2024-09-01 PROCEDURE — 85610 PROTHROMBIN TIME: CPT

## 2024-09-01 PROCEDURE — 97165 OT EVAL LOW COMPLEX 30 MIN: CPT | Mod: GO

## 2024-09-01 PROCEDURE — 85520 HEPARIN ASSAY: CPT

## 2024-09-01 PROCEDURE — 97530 THERAPEUTIC ACTIVITIES: CPT | Mod: GO

## 2024-09-01 PROCEDURE — 84460 ALANINE AMINO (ALT) (SGPT): CPT

## 2024-09-01 PROCEDURE — 99233 SBSQ HOSP IP/OBS HIGH 50: CPT | Mod: GC | Performed by: INTERNAL MEDICINE

## 2024-09-01 PROCEDURE — 120N000005 HC R&B MS OVERFLOW UMMC

## 2024-09-01 PROCEDURE — 250N000011 HC RX IP 250 OP 636

## 2024-09-01 RX ORDER — FUROSEMIDE 10 MG/ML
20 INJECTION INTRAMUSCULAR; INTRAVENOUS DAILY
Status: DISCONTINUED | OUTPATIENT
Start: 2024-09-02 | End: 2024-09-02

## 2024-09-01 RX ADMIN — ACETAMINOPHEN 975 MG: 325 TABLET ORAL at 15:25

## 2024-09-01 RX ADMIN — ATORVASTATIN CALCIUM 80 MG: 80 TABLET, FILM COATED ORAL at 07:58

## 2024-09-01 RX ADMIN — MAGNESIUM OXIDE TAB 400 MG (241.3 MG ELEMENTAL MG) 400 MG: 400 (241.3 MG) TAB at 07:57

## 2024-09-01 RX ADMIN — ESCITALOPRAM OXALATE 10 MG: 10 TABLET ORAL at 07:58

## 2024-09-01 RX ADMIN — OXYCODONE HYDROCHLORIDE AND ACETAMINOPHEN 1000 MG: 500 TABLET ORAL at 07:57

## 2024-09-01 RX ADMIN — AMIODARONE HYDROCHLORIDE 200 MG: 200 TABLET ORAL at 07:58

## 2024-09-01 RX ADMIN — Medication 1 TABLET: at 19:56

## 2024-09-01 RX ADMIN — CLOPIDOGREL BISULFATE 75 MG: 75 TABLET ORAL at 07:58

## 2024-09-01 RX ADMIN — POTASSIUM CHLORIDE 20 MEQ: 750 TABLET, EXTENDED RELEASE ORAL at 07:57

## 2024-09-01 RX ADMIN — Medication 1 TABLET: at 07:57

## 2024-09-01 RX ADMIN — EMPAGLIFLOZIN 10 MG: 10 TABLET, FILM COATED ORAL at 07:58

## 2024-09-01 RX ADMIN — HEPARIN SODIUM 1100 UNITS/HR: 10000 INJECTION, SOLUTION INTRAVENOUS at 15:11

## 2024-09-01 RX ADMIN — FUROSEMIDE 10 MG: 10 INJECTION, SOLUTION INTRAVENOUS at 07:58

## 2024-09-01 ASSESSMENT — ACTIVITIES OF DAILY LIVING (ADL)
ADLS_ACUITY_SCORE: 38
ADLS_ACUITY_SCORE: 37
ADLS_ACUITY_SCORE: 23
ADLS_ACUITY_SCORE: 38
ADLS_ACUITY_SCORE: 38
DEPENDENT_IADLS:: INDEPENDENT
ADLS_ACUITY_SCORE: 37
ADLS_ACUITY_SCORE: 38
ADLS_ACUITY_SCORE: 38
ADLS_ACUITY_SCORE: 37
ADLS_ACUITY_SCORE: 37
ADLS_ACUITY_SCORE: 23
PREVIOUS_RESPONSIBILITIES: MEAL PREP;HOUSEKEEPING;LAUNDRY;SHOPPING;YARDWORK;MEDICATION MANAGEMENT;FINANCES;DRIVING
ADLS_ACUITY_SCORE: 38
ADLS_ACUITY_SCORE: 38
ADLS_ACUITY_SCORE: 23
ADLS_ACUITY_SCORE: 23
ADLS_ACUITY_SCORE: 38
ADLS_ACUITY_SCORE: 37
ADLS_ACUITY_SCORE: 23
ADLS_ACUITY_SCORE: 38

## 2024-09-01 NOTE — PROGRESS NOTES
[facility]  Cardiology II Service / Advanced Heart Failure  Daily Progress Note    Patient: Duane C Johnson      : 1950      MRN: [id number]    Assessment/Plan:   Duane C Johnson is a 74 year old male pmhx of anterior STEMI s/p PCI to the pLAD on 10/26/23 with a VT/VF arrest the next day (10/27) with patent stents and unchanged anatomy on repeat angiogram. Placed on amiodarone and discharged 23, ICD was not indicated as this was within 48h of his MI. His EF prior to discharge was 20-30% with a large area of akinesis in the LAD, placed on apixaban due to this (Apixaban dcd on 24). Has had multiple admissions for HF exacerbation last year.  Admitted on 24. He presents for 2 weeks of worsening fatigue; found to have BNP of 16k and L pleural effusion.     Today's changes:   -- Continue heparin gtt   -- Increase to Lasix 20mg IV daily   -- Continue to hold GDMT   -- Plan for RHC and cardiopulmonary stress test (CPX) on 9/3    # Acute heart failure exacerbation  #Possible ambulatory shock  # HFrEf 2/2 ICM (EF 20-30% by TTE 10/23)  #mod-severe MR   #Afib  Fluid status: Hypervolemic  ACEi/ARB/ARNi/afterload reduction: lisinopril 2.5 mg daily, hold for SBP < 90  BB: metoprolol succinate 12.5 mg HS, hold for SBP < 90, HR < 50  Aldosterone antagonist: unable to start d/t hypotension   SGLT2i: Empagliflozin 10mg  SCD prophylaxis: s/p ICD 2024  NSAID use: contraindicated  Antiarrhythmic: Amiodarone 200mg   Diuretic: Was on Bumex 1mg daily; this was changed to PRN on 24 when Empagliflozin was started    Echo from  with EF 15-20% and trace MR. See below for full report.    BP as low as 84/62 (MAP 69), has remained asymptomatic other than fatigue.   Reports having 2L of urine output yesterday after 40mg IV lasix, nothing charted.    - BNP of 16k on admission with L sided pleural effusion   - Holding GDMT while diuresing because of soft pressures.    -  Low intensity heparin gtt    - Reporting 500mL of output yesterday, increasing Lasix to 20mg IV daily   - Plan for RHC and CPX on 9/3       # CAD s/p PCI to LAD  # STEMI  # VT/VF arrest  # ICD placed on 5/24  - PTA Plavix, Amio 200, Magnesium 400, Potassium 20  - Mag and Potassium replacement per nursing protocol  - Was on apixaban for low EF (akinesis of the mid-distal anterior,mid anteroseptum, distal septum and the apex) and questionable history of Afib. Since there was no episode of Afib captured apixaban was stopped on 7/5/24.     #DOMENICA  Baseline Cr normal at ~1. Cr of 1.55 on 8/24 with this acute episode of HF. Concern for type 1 cardiorenal syndrome. uPCR negative. Improving.    - Diuresis as above  - avoid nephrotoxins     ================================  Chronic Problems:    #Prostate Cancer  Diagnosed in March 2023. Underwent Leuprolide on 3/19/24 and 6/11/24. Completed radiation treatment on 6/27/24     #L inguinal hernia  Pt reports this has been present for some time. Not causing any pain. Not a surgical candidate with current HF status so will not consult surgery at this time.     Hospital Checklist:  DVT Prophylaxis: Heparin gtt  Code Status: Full Code  Bowel regimen: PRN  Diet/Nutrition: 2L fluid and 2G sodium restriction  Lines: Left PIV    Disposition Planning:  Pending RHC results on 9/3    Staffed w/ Dr. Sloan.    Cristobal Fisher MD  Internal Medicine PGY-1  Cardiology II Service     09/01/2024  ==================================    Subjective:   NAEO. Pt believes that he urinated ~500mL yesterday. Has been able to walk to and from restroom without assistance. Denies SOB at rest but is requesting 1L of O2 via NC for comfort. While walking did not feel any dizziness, chest pain, increased SOB.     Objective:     Vitals:    08/30/24 1358 09/01/24 1009   Weight: 66 kg (145 lb 8 oz) 61.9 kg (136 lb 6.4 oz)     Vital Signs with Ranges  Temp:  [98.8  F (37.1  C)-99.7  F (37.6  C)] 99.7  F (37.6   C)  Pulse:  [] 106  Resp:  [16-18] 16  BP: ()/() 95/70  SpO2:  [95 %-100 %] 95 %  I/O last 3 completed shifts:  In: 240 [P.O.:240]  Out: 700 [Urine:700]    Exam:  General: No acute distress,   HEENT: MMM  Neck: No apparent JVD visualized  CV: Irregularly irregular, no murmurs,   Lungs: On RA, Minimal crackles in bases. Otherwise clear w/o wheezes or crackles, no increased WOB  Abd: Soft, non-tender, non-distended  Ext: Warm and well-perfused, no lower extremity edema  Neuro: Awake, alert, conversational, moving all extremities spontaneously throughout examination    Medications   Current Facility-Administered Medications   Medication Dose Route Frequency Provider Last Rate Last Admin    heparin 25,000 units in 0.45% NaCl 250 mL ANTICOAGULANT infusion  0-5,000 Units/hr Intravenous Continuous Cristobal Fisher MD 11 mL/hr at 09/01/24 1012 1,100 Units/hr at 09/01/24 1012     Current Facility-Administered Medications   Medication Dose Route Frequency Provider Last Rate Last Admin    amiodarone (PACERONE) tablet 200 mg  200 mg Oral Daily Cristobal Fisher MD   200 mg at 09/01/24 0758    atorvastatin (LIPITOR) tablet 80 mg  80 mg Oral Daily Cristobal Fisher MD   80 mg at 09/01/24 0758    clopidogrel (PLAVIX) tablet 75 mg  75 mg Oral Daily Cristobal Fisher MD   75 mg at 09/01/24 0758    empagliflozin (JARDIANCE) tablet 10 mg  10 mg Oral Daily Cristobal Fisher MD   10 mg at 09/01/24 0758    escitalopram (LEXAPRO) tablet 10 mg  10 mg Oral Daily Cristobal Fisher MD   10 mg at 09/01/24 0758    furosemide (LASIX) injection 10 mg  10 mg Intravenous Daily Cristobal Fisher MD   10 mg at 09/01/24 0758    [Held by provider] lisinopril (ZESTRIL) tablet 2.5 mg  2.5 mg Oral Daily Cristobal Fisher MD        magnesium oxide (MAG-OX) tablet 400 mg  400 mg Oral Daily Cristobal Fisher MD   400 mg at 09/01/24 0757    [Held by provider] metoprolol succinate ER (TOPROL-XL) 24 hr half-tab 12.5 mg  12.5 mg Oral Daily Cristobal Fisher MD         multivitamin w/minerals (THERA-VIT-M) tablet 1 tablet  1 tablet Oral BID Cristobal Fisher MD   1 tablet at 24 0757    potassium chloride tray ER (KLOR-CON M10) CR tablet 20 mEq  20 mEq Oral Daily Cristobal Fisher MD   20 mEq at 24 0757    vitamin C (ASCORBIC ACID) tablet 1,000 mg  1,000 mg Oral Daily Cristobal Fisher MD   1,000 mg at 24 0757       Data   Recent Labs   Lab 24  0709 24  0414 24  1455 24  1632 24  1112   WBC  --  10.6 9.8  --  6.4   HGB  --  9.8* 10.2*  --  11.2*   MCV  --  94 97  --  94   PLT  --  288 256  --  165   INR 1.30* 1.26* 1.24*  --   --     135 136   < > 136   POTASSIUM 4.6 4.6 4.3   < > 4.3   CHLORIDE 104 104 104   < > 104   CO2 24 21* 24   < > 24   BUN 30.7* 23.8* 21.9   < > 26.1*   CR 1.21* 1.28* 1.28*   < > 1.36*   ANIONGAP 9 10 8   < > 8   PRANAV 8.4* 8.4* 8.7*   < > 8.6*   GLC 95 116* 100*   < > 110*   ALBUMIN 3.0*  --  3.2*   < > 3.3*   PROTTOTAL 6.5  --  6.6   < > 6.7   BILITOTAL 0.4  --  0.4   < > 1.1   ALKPHOS 101  --  113   < > 106   ALT 31  --  39   < > 62   AST 88*  --  43   < > 91*    < > = values in this interval not displayed.       Echocardiogram Complete   Result Value    LVEF  15-20% (severely reduced)    [facility]    [id number]  [id number]  [id number]  [id number]^ROXANA^CRISTOBAL     [facility],[place]  Echocardiography Laboratory  [address] [place] [id number]     Name: JOHNSON, DUANE C  MRN: [id number]  : 1950  Study Date: 2024 08:20 AM  Age: 74 yrs  Gender: Male  Patient Location: [facility]  Reason For Study: CHF  Ordering Physician: CRISTOBAL FISHER  Performed By: Yessenia Cano RDCS     BSA: 1.7 m2  Height: 66 in  Weight: 145 lb  HR: 71  BP: 94/73 mmHg  ______________________________________________________________________________  Procedure  Complete Portable Echo Adult. Contrast Optison. Optison (NDC #0525-5169-23)  given intravenously. Patient was given 6 ml mixture of 3 ml  Optison and 6 ml  saline. 3 ml wasted.  ______________________________________________________________________________  Interpretation Summary  Left ventricular function is decreased. The ejection fraction is 15-20%  (severely reduced).  Moderate left ventricular dilation is present.  Apical wall akinesis is present.  Severe diffuse hypokinesis is present.  There is no thrombus seen in the left ventricle.  The right ventricle is normal size.  Global right ventricular function is normal.  Mild functional mitral insufficiency is present.  Mild to moderate tricuspid functional insufficiency is present.  Pulmonary hypertension is present.The right ventricular systolic pressure is  40mmHg above the right atrial pressure.  IVC diameter and respiratory changes fall into an intermediate range  suggesting an RA pressure of 8 mmHg.     This study was compared with the study from 2/19/2024 .MR, TR improved. LVEF  similar in both studies compared side to side. So overall no change in LVEF  ______________________________________________________________________________  Left Ventricle  Left ventricular wall thickness is normal. Moderate left ventricular dilation  is present. Left ventricular diastolic function is not assessable. Left  ventricular function is decreased. The ejection fraction is 15-20% (severely  reduced). Apical wall akinesis is present. Severe diffuse hypokinesis is  present. There is no thrombus seen in the left ventricle.     Right Ventricle  The right ventricle is normal size. Global right ventricular function is  normal. A pacemaker lead is noted in the right ventricle.     Atria  Moderate left atrial enlargement is present. Moderate right atrial enlargement  is present.     Mitral Valve  Mild mitral insufficiency is present.     Aortic Valve  Trileaflet aortic sclerosis without stenosis. On Doppler interrogation, there  is no significant stenosis or regurgitation.     Tricuspid Valve  Mild to moderate  tricuspid insufficiency is present. The right ventricular  systolic pressure is approximated at 39.7 mmHg plus the right atrial pressure.  Pulmonary hypertension is present. The right ventricular systolic pressure is  40mmHg above the right atrial pressure.     Pulmonic Valve  On Doppler interrogation, there is no significant stenosis or regurgitation.     Vessels  The aorta root is normal. IVC diameter and respiratory changes fall into an  intermediate range suggesting an RA pressure of 8 mmHg.     Pericardium  No pericardial effusion is present.     Compared to Previous Study  This study was compared with the study from 2024 . MR, TR improved. LVEF  similar in both studies compared side to side. So overall no change in LVEF.  ______________________________________________________________________________  MMode/2D Measurements & Calculations     IVSd: 0.89 cm  LVIDd: 6.0 cm  LVIDs: 5.5 cm  LVPWd: 0.79 cm  FS: 7.1 %  LV mass(C)d: 197.0 grams  LV mass(C)dI: 112.9 grams/m2  LVOT diam: 2.0 cm  LVOT area: 3.2 cm2  EF Biplane: 16.8 %  LA Volume (BP): 71.3 ml  LA Volume Index (BP): 41.0 ml/m2  RV Base: 4.6 cm  RWT: 0.27     TAPSE: 1.0 cm     Doppler Measurements & Calculations  MV E max ivan: 82.7 cm/sec  LV V1 max P.9 mmHg  LV V1 max: 84.2 cm/sec  LV V1 VTI: 13.8 cm  MR PISA: 4.2 cm2  MR ERO: 0.33 cm2  MR volume: 35.7 ml  SV(LVOT): 43.8 ml  SI(LVOT): 25.1 ml/m2  PA acc time: 0.08 sec  TR max ivan: 315.1 cm/sec  TR max P.7 mmHg  RV S Ivan: 10.7 cm/sec     ______________________________________________________________________________  Report approved by: Jeanine MEJIA 2024 11:05 AM            Examination: XR CHEST 2 VIEWS 2024 3:06 PM     Indication: Shortness of breath     Comparison: Radiograph 2024. CT 2023.     Findings:  PA and lateral views of the chest. Left chest wall implantable cardiac  defibrillator with grossly intact leads/shock coil. Coronary stenting.  Trachea is midline.  Stable mild cardiomegaly. Mild blunting of the  left costophrenic angle, likely representing small pleural effusion.  No pneumothorax. No focal consolidation or any definite abnormal  airspace opacities. No acute or suspicious osseous abnormality.  Unremarkable visualized abdomen.      Impression   Impression:   1. Stable mild cardiomegaly with implantable cardiac defibrillator and  coronary stenting.  2. Small left pleural effusion.     I have personally reviewed the examination and initial interpretation  and I agree with the findings.     PANCHITO EID MD         SYSTEM ID:  [id number]      12/6/2023 CMR  Clinical history: 73 year old with anterior STEMI, cardiac arrest in Oct 2023  Comparison CMR: none  1. The left ventricle is severely enlarged. There is wall thinning and akinesis of the mid-distal anterior,  mid anteroseptum, distal septum and the apex  The global systolic function is severely reduced. The LVEF is 28%.  2. The right ventricle is normal in cavity size. The global systolic function is normal. The RVEF is 52%.   3. The right atrium is normal and the left atrium is severely enlarged.  4. There is mild mitral regurgitation.   5. There is transmural late gadolinium enhancement in mid-distal anterior, mid anteroseptum, distal  septum and the apex consistent with a large infarction in the LAD territory.   6. There is no pericardial effusion.  7. There is no intracardiac thrombus.  8. There are bilateral pleural effusions.  CONCLUSIONS:   Ischemic cardiomyopathy with a large anteroapical infarction.   Severely reduced left ventricular function and normal right ventricular function, LVEF 28% and RVEF 52%.     Cardiac Catheterization:  10/26/23    1st Mrg lesion is 20% stenosed.    Prox LAD to Mid LAD lesion is 100% stenosed.    1st Diag-1 lesion is 80% stenosed.    1st Diag-2 lesion is 50% stenosed.    Dist LAD lesion is 100% stenosed.    RPDA lesion is 70% stenosed.    Dist RCA lesion is 40%  stenosed.     Anterior STEMI  Two vessel obstructive CAD (100% pLAD occlusion, 70% rPDA stenosis, 80% diagonal 1 stenosis)  PCI of pLAD to mLAD with BRENDA x 1 (Synergy 2.50x 28 mm) post dilated to 2.75 vessel  CVC placement in RIJ  LVEDP of 26 mmHg  Hemostasis of RRA with TR band      2/19/2024 Echo  Interpretation Summary     1. The left ventricle is moderately dilated. Left ventricular hypertrophy is  noted by two-dimensional echocardiography. Proximal septal thickening is  noted. Left ventricular systolic function is moderate to severely reduced. The  visual ejection fraction is 25-30%. Biplane LVEF is 25%. Diastolic Doppler  findings (E/E' ratio and/or other parameters) suggest left ventricular filling  pressures are increased. There is severe hypokinesis to akinesis of the mid  anterior/anterolateral/anteroseptal/inferoseptal walls and all apical segments  of the left ventricle. There is no thrombus seen in the left ventricle.  2. The right ventricle is normal size. The right ventricular systolic function  is normal.  3. The left atrium is moderately dilated. Right atrial size is normal. There  is no color Doppler evidence of an atrial shunt.  4. There is moderate to mod-severe (2-3+) mitral regurgitation.  5. There is mild to moderate (1-2+) tricuspid regurgitation.Right ventricular  systolic pressure is elevated, consistent with moderate pulmonary  hypertension.  6. No pericardial effusion.  7. In direct comparison to the previous study dated 10/30/2023, there has been  an interval increase in the degree of mitral regurgitation. The other findings  are similar.     RHC 5/6/24  RA: 10/9/6 mmHg  RV: 61/11 mmHg  PA: 63/24/38 mmHg  PCWP: Unable to obtain accurate measurement  CO/CI (Goldy): 3.89/2.3 L/min/m2    PA sat: 64%  MAP: 85 mmHg       Right sided filling pressures are normal.    Mild elevated pulmonary hypertension.    Normal cardiac output level.        Device interrogated on 8/30/24  Device: [email or web address]  Scientific D533 RESONATE HF ICD  Normal device function.  Mode: DDDR  bpm  AP: 33%  : 3%  Intrinsic rhythm: Afib w/ VS  bpm  Thoracic Impedance: Below reference line, suggests possible intrathoracic fluid accumulation.  Lead Trends Appear Stable.  Estimated battery longevity to RRT = 12.5 years.    Atrial Arrhythmia: AF episode began on 8/28/24  AF [place]: 11%  Anticoagulant: Eliquis  Ventricular Arrhythmia: None  Setting Changes: Rate response turned ON in device, pacing mode changed to DDDR from DDD, fallback rate during AT/AF episodes 70 bpm.

## 2024-09-01 NOTE — PLAN OF CARE
Highlights/changes today:   Pt sitting upright in bed and eating breakfast this am.   Up to bathroom with 1 BM.   1 L NC for comfort per pt request, pt sating in the high 90's.   Worked with therapy this am, walked hallway x 4.     Neuro: A&Ox4. Calm, cooperative, and pleasant.   Cardiac: SR/ST, HR 's. Afebrile and soft BP, team aware per previous RN.   Respiratory: Sating in the high 90's on 1 L NC for comfort per pt request.  GI/: Urinal bedside with adequate urine output and 1 BM during shift.   Diet/appetite: Tolerating 2 g Na diet with 2000 ml fluid restriction, eating well.  Activity: Up ad mackenzie, up to bathroom. Worked with therapy, walked hallway, currently back in bed.   Pain: Denies.   Skin: No new deficits noted.  LDA's: L PIV, infusing heparin gtt 1100 units/hr.     Plan: Continue with POC. Notify primary team with changes.

## 2024-09-01 NOTE — PROGRESS NOTES
"   09/01/24 1250   Appointment Info   Signing Clinician's Name / Credentials (OT) KUSHAL Newberry   Student Supervision Line of sight supervision provided   Living Environment   People in Home spouse   Current Living Arrangements house   Home Accessibility stairs within home   Number of Stairs, Within Home, Primary greater than 10 stairs   Stair Railings, Within Home, Primary railings safe and in good condition   Transportation Anticipated car, drives self;family or friend will provide   Living Environment Comments Pt lives with wife on \"[place]\" with 2 cats. Uses [employer] cart for transportation around land. Has walk-in shower, no grab bars.   Self-Care   Usual Activity Tolerance moderate   Current Activity Tolerance fair   Regular Exercise No   Equipment Currently Used at Home other (see comments)  (2 stair lifts)   Fall history within last six months no   Activity/Exercise/Self-Care Comment Pt goes on walks in the woods. Pt has a manual wchr at home that he could use if needed. IND in ADLs/IADLs.   Instrumental Activities of Daily Living (IADL)   Previous Responsibilities meal prep;housekeeping;laundry;shopping;yardwork;medication management;finances;driving   IADL Comments IND in ADLs/IADLs. Pt responsible for most home management.   General Information   Onset of Illness/Injury or Date of Surgery 08/30/24   Referring Physician Cristobal Fisher MD   Patient/Family Therapy Goal Statement (OT) return home   Additional Occupational Profile Info/Pertinent History of Current Problem per chart: Duane C Johnson is a 74 year old male pmhx of anterior STEMI s/p PCI to the pLAD on 10/26/23 with a VT/VF arrest the next day (10/27) with patent stents and unchanged anatomy on repeat angiogram. Placed on amiodarone and discharged 11/03/23, ICD was not indicated as this was within 48h of his MI. His EF prior to discharge was 20-30% with a large area of akinesis in the LAD, placed on apixaban due to this (Apixaban dcd on " 7/5/24). Has had multiple admissions for HF exacerbation last year.  Admitted on 8/30/24. He presents for 2 weeks of worsening fatigue; found to have BNP of 16k and L pleural effusion.   Existing Precautions/Restrictions cardiac   Left Upper Extremity (Weight-bearing Status) full weight-bearing (FWB)   Right Upper Extremity (Weight-bearing Status) full weight-bearing (FWB)   Left Lower Extremity (Weight-bearing Status) full weight-bearing (FWB)   Right Lower Extremity (Weight-bearing Status) full weight-bearing (FWB)   Cognitive Status Examination   Orientation Status orientation to person, place and time   Visual Perception   Visual Impairment/Limitations WFL;corrective lenses for reading   Sensory   Sensory Quick Adds sensation intact   Pain Assessment   Patient Currently in Pain No   Posture   Posture protracted shoulders   Range of Motion Comprehensive   General Range of Motion no range of motion deficits identified   Strength Comprehensive (MMT)   General Manual Muscle Testing (MMT) Assessment no strength deficits identified   Muscle Tone Assessment   Muscle Tone Quick Adds No deficits were identified   Coordination   Upper Extremity Coordination No deficits were identified   Gross Motor Coordination No deficits identified   Bed Mobility   Bed Mobility No deficits identified   Transfers   Transfers sit-stand transfer   Sit-Stand Transfer   Sit-Stand [place] (Transfers) independent   Balance   Balance Comments Pt with occasional path deviations. Pt reported this is how he walks at baseline   Clinical Impression   Criteria for Skilled Therapeutic Interventions Met (OT) Yes, treatment indicated   OT Diagnosis increased risk for deconditioning   OT Problem List-Impairments impacting ADL problems related to;activity tolerance impaired;strength   Assessment of Occupational Performance 1-3 Performance Deficits   Identified Performance Deficits community mobility, home management   Planned Therapy Interventions  (OT) strengthening;progressive activity/exercise   Clinical Decision Making Complexity (OT) problem focused assessment/low complexity   Risk & Benefits of therapy have been explained evaluation/treatment results reviewed;care plan/treatment goals reviewed;risks/benefits reviewed;current/potential barriers reviewed;participants voiced agreement with care plan;participants included;patient   OT Total Evaluation Time   OT Eval, Low Complexity Minutes (34425) 5   OT Goals   Therapy Frequency (OT) 3 times/week   OT Predicted Duration/Target Date for Goal Attainment 09/08/24   OT Goals Cardiac Phase 1   OT: Understanding of cardiac education to maximize quality of life, condition management, and health outcomes Patient;Verbalize   OT: Perform aerobic activity with stable cardiovascular response ambulation;NuStep;20 minutes   OT: Functional/aerobic ambulation tolerance with stable cardiovascular response in order to return to home and community environment Independent   OT: Navigation of stairs simulating home set up with stable cardiovascular response in order to return to home and community environment Modified independent;Greater than 10 stairs   Interventions   Interventions Quick Adds Therapeutic Activity   Therapeutic Activities   Therapeutic Activity Minutes (95985) 21   Symptoms noted during/after treatment none   Treatment Detail/Skilled Intervention OT: Pt greeted supine in bed, pleasant and agreeable to therapy. Pt performed bed mobility IND, and STS IND. Pt ambulated ~500' with SBA and unilateral grasp of IV pole.  Th challenged pt to ambulate without IV pole.  Pt completed additional ~500' with SBA only.  Pt with keller gait and occasional path deviations which were self-corrected.  Pt reports this is how he walks at baseline. Pt returned to room, completed STS and bed mobility with SBA. Pt very conversational and somewhat tangential. Additional time needed for vital sign monitoring and evironmental set-up to  ensure safety with all task completion. Pt left supine in bed with all needs met, VSS, on RA.   OT Discharge Planning   OT Plan ambulation to prog activity duong, educ on cardiac condition management   OT Discharge Recommendation (DC Rec) home with assist;home with outpatient cardiac rehab   OT Rationale for DC Rec Pt IND with I/ADLs. Anticipate pt will progress with IP therapies to safely d/c home with assist once medically ready.   OT Brief overview of current status SBA ambulation, STS, and bed mobility   Total Session Time   Timed Code Treatment Minutes 21   Total Session Time (sum of timed and untimed services) 26

## 2024-09-01 NOTE — CONSULTS
Care Management Initial Consult    General Information  Assessment completed with: Patient, VM-chart review,    Type of CM/SW Visit: Initial Assessment    Primary Care Provider verified and updated as needed:     Readmission within the last 30 days: no previous admission in last 30 days (ED visits 8/24 and 8/26/2024 for fatigue)      Reason for Consult: utilization management concerns (elevated readmission risk score)  Advance Care Planning: Advance Care Planning Reviewed: no concerns identified (per chart review pt is not interested in completing an health care directive)        Communication Assessment  Patient's communication style: spoken language (English or Bilingual) (Simultaneous filing. User may not have seen previous data.)    Hearing Difficulty or Deaf: no   Wear Glasses or Blind: yes    Cognitive  Cognitive/Neuro/Behavioral: WDL                      Living Environment:   People in home: spouse  Duane and Melissa  Current living Arrangements: house      Able to return to prior arrangements: yes  Living Arrangement Comments: Residence is a 2-story home with a walk-out basement. Bedrooms are on the 2nd story and there are bathrooms on eac level    Family/Social Support:  Care provided by: self  Provides care for: no one  Marital Status:   Support system: Wife, Friend  Melissa       Description of Support System: Supportive, Involved    Support Assessment: Adequate family and caregiver support, Adequate social supports    Current Resources:   Patient receiving home care services: No      Community Resources:    Equipment currently used at home: other (see comments) (2 chair lifts for the stairs)  Supplies currently used at home: None    Employment/Financial:  Employment Status: retired        Financial Concerns: none   Referral to Financial Worker: No    Does the patient's insurance plan have a 3 day qualifying hospital stay waiver?  No    Lifestyle & Psychosocial Needs:  Social Determinants of Health      Food Insecurity: Low Risk  (9/1/2024)    Food Insecurity     Within the past 12 months, did you worry that your food would run out before you got money to buy more?: No     Within the past 12 months, did the food you bought just not last and you didn t have money to get more?: No   Depression: Not at risk (7/5/2024)    PHQ-2     PHQ-2 Score: 0   Housing Stability: High Risk (9/1/2024)    Housing Stability     Do you have housing? : No     Are you worried about losing your housing?: No   Tobacco Use: Medium Risk (8/30/2024)    Patient History     Smoking Tobacco Use: Former     Smokeless Tobacco Use: Never     Passive Exposure: Past   Financial Resource Strain: Low Risk  (9/1/2024)    Financial Resource Strain     Within the past 12 months, have you or your family members you live with been unable to get utilities (heat, electricity) when it was really needed?: No   Alcohol Use: Not on file   Transportation Needs: Low Risk  (9/1/2024)    Transportation Needs     Within the past 12 months, has lack of transportation kept you from medical appointments, getting your medicines, non-medical meetings or appointments, work, or from getting things that you need?: No   Physical Activity: Inactive (11/16/2023)    Exercise Vital Sign     Days of Exercise per Week: 0 days     Minutes of Exercise per Session: 0 min   Interpersonal Safety: Low Risk  (9/1/2024)    Interpersonal Safety     Do you feel physically and emotionally safe where you currently live?: Yes     Within the past 12 months, have you been hit, slapped, kicked or otherwise physically hurt by someone?: No     Within the past 12 months, have you been humiliated or emotionally abused in other ways by your partner or ex-partner?: No   Stress: Not on file   Social Connections: Unknown (11/16/2023)    Social Connection and Isolation Panel [NHANES]     Frequency of Communication with Friends and Family: Not on file     Frequency of Social Gatherings with Friends and  "Family: Not on file     Attends Restorationism Services: Not on file     Active Member of Clubs or Organizations: Not on file     Attends Club or Organization Meetings: Not on file     Marital Status:    Health Literacy: Not on file     Functional Status:  Prior to admission patient needed assistance:   Dependent ADLs:: Independent  Dependent IADLs:: Independent    Mental Health Status:  Mental Health Status: No Current Concerns       Chemical Dependency Status:  Chemical Dependency Status: No Current Concerns        Values/Beliefs:  Spiritual, Cultural Beliefs, Restorationism Practices, Values that affect care: no               Discussed  Partnership in Safe Discharge Planning  document with patient/family: Not at this time    Additional Information:    Per chart review: \"Duane C Johnson is a 74 year old male pmhx of anterior STEMI s/p PCI to the pLAD on 10/26/23 with a VT/VF arrest the next day (10/27) with patent stents and unchanged anatomy on repeat angiogram. Placed on amiodarone and discharged 11/03/23, ICD was not indicated as this was within 48h of his MI. His EF prior do discharge was 20-30% with a large area of akinesis in the LAD placed on apixaban due to this. Has had multiple admissions for HF exacerbation last year.  He presents for 2 weeks of worsening fatigue; found to have BNP of 16k and L pleural effusion.\" (Cristobal Fisher MD; 8/30/2024)    Care Management/Social Work Consult placed due to patient's complex medical history and elevated unplanned readmission risk score and for discharge planning. STACIA performed chart review to begin assessment.     1700 SW met with Duane at bedside to complete assessment and confirm/update information in previous assessment (10/28/2023). STACIA introduced self and explained the reason for the visit. Duane agreed to speak with SW.    Duane lives with his wife Melissa in a 2-story [place] There is a bathroom on each level and the " "bedrooms are on the 2nd story. He reports that after one of his admissions he had a chair lift installed in his home to assist with going up and down the stairs. He said he used it for quite a while and was eventually able to discontinue using it. He said that he has had to use it again for the last month or so.    Duane uses a 2nd-hand manual wheelchair for getting around his pole barn. He said \"I can scoot around faster in it than walking\". He also uses a golf cart to navigate his 5 acre property. He said that he used to walk all the time and has multiple walking paths [place], but is currently unable to walk as much as previously. He is otherwise independent in his I/ADLs. He anticipates no needs at discharge and that his wife will provide discharge transportation. No additional questions or concerns were reported. SW provided availability and contact information and excused self from Duane's bedside.    Next Steps:    TBD  STACIA will continue to follow as needed.    MONTRELL Hansen, LG  ED/OBS   [facility]  Phone: [phone]  Pager: [phone]  Fax: [phone]     After hours [employer] and After Hours [employer] [place]     On-call pager, [phone], 4:00 pm to midnight                "

## 2024-09-01 NOTE — PROGRESS NOTES
Soft BP, afebrile, lower back pain 1/10, requested and was given Tylenol, on RA, on cardiac monitor, denies nausea/vomiting, up ad mackenzie, voiding spontaneously, Hep Xa 0.20, rate adjusted per order, next draw at 2239, pt transferred to 6C

## 2024-09-02 LAB
ALBUMIN SERPL BCG-MCNC: 2.8 G/DL (ref 3.5–5.2)
ALP SERPL-CCNC: 110 U/L (ref 40–150)
ALT SERPL W P-5'-P-CCNC: 29 U/L (ref 0–70)
ANION GAP SERPL CALCULATED.3IONS-SCNC: 9 MMOL/L (ref 7–15)
AST SERPL W P-5'-P-CCNC: 63 U/L (ref 0–45)
BASOPHILS # BLD AUTO: 0 10E3/UL (ref 0–0.2)
BASOPHILS NFR BLD AUTO: 0 %
BILIRUB SERPL-MCNC: 0.4 MG/DL
BUN SERPL-MCNC: 36.3 MG/DL (ref 8–23)
CALCIUM SERPL-MCNC: 8.4 MG/DL (ref 8.8–10.4)
CHLORIDE SERPL-SCNC: 104 MMOL/L (ref 98–107)
CREAT SERPL-MCNC: 1.19 MG/DL (ref 0.67–1.17)
EGFRCR SERPLBLD CKD-EPI 2021: 64 ML/MIN/1.73M2
EOSINOPHIL # BLD AUTO: 0.1 10E3/UL (ref 0–0.7)
EOSINOPHIL NFR BLD AUTO: 1 %
ERYTHROCYTE [DISTWIDTH] IN BLOOD BY AUTOMATED COUNT: 15.9 % (ref 10–15)
GLUCOSE SERPL-MCNC: 108 MG/DL (ref 70–99)
HCO3 SERPL-SCNC: 21 MMOL/L (ref 22–29)
HCT VFR BLD AUTO: 29.7 % (ref 40–53)
HGB BLD-MCNC: 9.6 G/DL (ref 13.3–17.7)
IMM GRANULOCYTES # BLD: 0.1 10E3/UL
IMM GRANULOCYTES NFR BLD: 1 %
INR PPP: 1.26 (ref 0.85–1.15)
LYMPHOCYTES # BLD AUTO: 0.5 10E3/UL (ref 0.8–5.3)
LYMPHOCYTES NFR BLD AUTO: 4 %
MAGNESIUM SERPL-MCNC: 2.2 MG/DL (ref 1.7–2.3)
MCH RBC QN AUTO: 31.1 PG (ref 26.5–33)
MCHC RBC AUTO-ENTMCNC: 32.3 G/DL (ref 31.5–36.5)
MCV RBC AUTO: 96 FL (ref 78–100)
MONOCYTES # BLD AUTO: 0.2 10E3/UL (ref 0–1.3)
MONOCYTES NFR BLD AUTO: 2 %
NEUTROPHILS # BLD AUTO: 11.8 10E3/UL (ref 1.6–8.3)
NEUTROPHILS NFR BLD AUTO: 92 %
NRBC # BLD AUTO: 0 10E3/UL
NRBC BLD AUTO-RTO: 0 /100
PLATELET # BLD AUTO: 329 10E3/UL (ref 150–450)
POTASSIUM SERPL-SCNC: 4.7 MMOL/L (ref 3.4–5.3)
PROT SERPL-MCNC: 6.3 G/DL (ref 6.4–8.3)
RBC # BLD AUTO: 3.09 10E6/UL (ref 4.4–5.9)
SODIUM SERPL-SCNC: 134 MMOL/L (ref 135–145)
UFH PPP CHRO-ACNC: 0.2 IU/ML
UFH PPP CHRO-ACNC: 0.31 IU/ML
UFH PPP CHRO-ACNC: 0.53 IU/ML
WBC # BLD AUTO: 12.7 10E3/UL (ref 4–11)

## 2024-09-02 PROCEDURE — 250N000013 HC RX MED GY IP 250 OP 250 PS 637

## 2024-09-02 PROCEDURE — 36415 COLL VENOUS BLD VENIPUNCTURE: CPT

## 2024-09-02 PROCEDURE — 85520 HEPARIN ASSAY: CPT

## 2024-09-02 PROCEDURE — 85025 COMPLETE CBC W/AUTO DIFF WBC: CPT

## 2024-09-02 PROCEDURE — 83735 ASSAY OF MAGNESIUM: CPT

## 2024-09-02 PROCEDURE — 82040 ASSAY OF SERUM ALBUMIN: CPT

## 2024-09-02 PROCEDURE — 250N000011 HC RX IP 250 OP 636

## 2024-09-02 PROCEDURE — 85610 PROTHROMBIN TIME: CPT

## 2024-09-02 PROCEDURE — 120N000005 HC R&B MS OVERFLOW UMMC

## 2024-09-02 RX ORDER — FUROSEMIDE 10 MG/ML
20 INJECTION INTRAMUSCULAR; INTRAVENOUS ONCE
Status: COMPLETED | OUTPATIENT
Start: 2024-09-02 | End: 2024-09-02

## 2024-09-02 RX ORDER — FUROSEMIDE 10 MG/ML
40 INJECTION INTRAMUSCULAR; INTRAVENOUS DAILY
Status: DISCONTINUED | OUTPATIENT
Start: 2024-09-03 | End: 2024-09-04

## 2024-09-02 RX ADMIN — HEPARIN SODIUM 1050 UNITS/HR: 10000 INJECTION, SOLUTION INTRAVENOUS at 13:34

## 2024-09-02 RX ADMIN — AMIODARONE HYDROCHLORIDE 200 MG: 200 TABLET ORAL at 09:05

## 2024-09-02 RX ADMIN — ATORVASTATIN CALCIUM 80 MG: 80 TABLET, FILM COATED ORAL at 09:05

## 2024-09-02 RX ADMIN — EMPAGLIFLOZIN 10 MG: 10 TABLET, FILM COATED ORAL at 09:04

## 2024-09-02 RX ADMIN — MAGNESIUM OXIDE TAB 400 MG (241.3 MG ELEMENTAL MG) 400 MG: 400 (241.3 MG) TAB at 09:05

## 2024-09-02 RX ADMIN — OXYCODONE HYDROCHLORIDE AND ACETAMINOPHEN 1000 MG: 500 TABLET ORAL at 09:04

## 2024-09-02 RX ADMIN — FUROSEMIDE 20 MG: 10 INJECTION, SOLUTION INTRAMUSCULAR; INTRAVENOUS at 13:34

## 2024-09-02 RX ADMIN — Medication 1 TABLET: at 19:31

## 2024-09-02 RX ADMIN — FUROSEMIDE 20 MG: 10 INJECTION, SOLUTION INTRAMUSCULAR; INTRAVENOUS at 09:05

## 2024-09-02 RX ADMIN — CLOPIDOGREL BISULFATE 75 MG: 75 TABLET ORAL at 09:05

## 2024-09-02 RX ADMIN — ACETAMINOPHEN 975 MG: 325 TABLET ORAL at 09:04

## 2024-09-02 RX ADMIN — Medication 5 MG: at 22:00

## 2024-09-02 RX ADMIN — ESCITALOPRAM OXALATE 10 MG: 10 TABLET ORAL at 09:04

## 2024-09-02 RX ADMIN — Medication 1 TABLET: at 09:04

## 2024-09-02 RX ADMIN — POTASSIUM CHLORIDE 20 MEQ: 750 TABLET, EXTENDED RELEASE ORAL at 09:05

## 2024-09-02 ASSESSMENT — ACTIVITIES OF DAILY LIVING (ADL)
ADLS_ACUITY_SCORE: 23

## 2024-09-02 NOTE — PROGRESS NOTES
Focus:  Admission  Diagnosis: CHF  Admitted from: [facility]   Via: stretcher   Accompanied by:transport.  Belongings: Placed in closet; valuables sent home with family.   Admission paperwork: complete.   Teaching: Call don't fall, use of console, meal times, visiting hours, orientation to unit, when to call for the RN (angina/sob/dizzyness, etc.), and stressed the importance of safety.   Access: PIV   Telemetry: Placed on pt   Ht./Wt.: complete

## 2024-09-02 NOTE — PLAN OF CARE
Goal Outcome Evaluation:      Plan of Care Reviewed With: patient    Overall Patient Progress: no changeOverall Patient Progress: no change    Outcome Evaluation: discharge home when medically stable    SW will continue to follow as needed.    MONTRELL Hansen, [facility]  ED/OBS   [facility]  Phone: [phone]  Pager: [phone]  Fax: [phone]     After hours [employer] and After Hours [employer] [place]     On-call pager, [phone], 4:00 pm to midnight

## 2024-09-02 NOTE — PLAN OF CARE
Goal Outcome Evaluation:      Plan of Care Reviewed With: patient    Overall Patient Progress: improvingOverall Patient Progress: improving     NURSING PROGRESS NOTE  Shift Summary      Date: September 2, 2024     Neuro/Musculoskeletal:  A&Ox4.   Cardiac:  A-fib.  VSS.     Respiratory:  Sating in the 90s on RA.  GI/:  Adequate urine output.  LBM: Yesterday  Diet/Appetite:  Tolerating Cardiac diet.  Activity: SBA   Pain:  Denies.   Skin:  No new deficits noted.   LDAs + Drips/IVF:  L PIV/ Heparin at 1250 units/hr  Protocols/Labs:  Anti Xa    Pertinent Shift Updates:  Uneventful night with no acute changes.       Plan:  [facility] 09/03      Derrick Taveras RN  .................................................... September 2, 2024   6:07 AM  [facility] [facility] [employer]): [facility] ICU (Unit 6D)

## 2024-09-02 NOTE — PROGRESS NOTES
[facility]  Cardiology II Service / Advanced Heart Failure  Daily Progress Note    Patient: Duane C Johnson      : 1950      MRN: [id number]    Assessment/Plan:   Duane C Johnson is a 74 year old male pmhx of anterior STEMI s/p PCI to the pLAD on 10/26/23 with a VT/VF arrest the next day (10/27) with patent stents and unchanged anatomy on repeat angiogram. Placed on amiodarone and discharged 23, ICD was not indicated as this was within 48h of his MI. His EF prior to discharge was 20-30% with a large area of akinesis in the LAD, placed on apixaban due to this (Apixaban dcd on 24). Has had multiple admissions for HF exacerbation last year.  Admitted on 24. He presents for 2 weeks of worsening fatigue; found to have BNP of 16k and L pleural effusion. Admitted for diuresis and RHC.     Today's changes:   -- Continue heparin gtt   -- Increase to Lasix 40mg IV daily   -- Continue to hold GDMT   -- Plan for RHC and cardiopulmonary stress test (CPX) on 9/3   - NPO at midnight     # Acute heart failure exacerbation  #Possible ambulatory shock  # HFrEf 2/2 ICM (EF 20-30% by TTE 10/23)  #Afib  Fluid status: Hypervolemic  ACEi/ARB/ARNi/afterload reduction: lisinopril 2.5 mg daily, hold for SBP < 90  BB: metoprolol succinate 12.5 mg HS, hold for SBP < 90, HR < 50  Aldosterone antagonist: unable to start d/t hypotension   SGLT2i: Empagliflozin 10mg  SCD prophylaxis: s/p ICD 2024  NSAID use: contraindicated  Antiarrhythmic: Amiodarone 200mg   Diuretic: Was on Bumex 1mg daily; this was changed to PRN on 24 when Empagliflozin was started    Echo from  with EF 15-20% and trace MR. See below for full report.    BP as low as 84/62 (MAP 69), has remained asymptomatic other than fatigue and mild SOB.    - BNP of 16k on admission with L sided pleural effusion   - Holding GDMT while diuresing because of soft pressures.    - Low intensity heparin gtt    -  Increasing to 40mg IV Lasix   - Plan for RHC and CPX on 9/3       # CAD s/p PCI to LAD  # STEMI  # VT/VF arrest  # ICD placed on 5/24  - PTA Plavix, Amio 200, Magnesium 400, Potassium 20  - Mag and Potassium replacement per nursing protocol  - Was on apixaban for low EF (akinesis of the mid-distal anterior,mid anteroseptum, distal septum and the apex) and questionable history of Afib. Since there was no episode of Afib captured apixaban was stopped on 7/5/24.     #DOMENICA  Baseline Cr normal at ~1. Cr of 1.55 on 8/24 with this acute episode of HF. Concern for type 1 cardiorenal syndrome. uPCR negative. Improving.    - Diuresis as above  - avoid nephrotoxins     ================================  Chronic Problems:    #Prostate Cancer  Diagnosed in March 2023. Underwent Leuprolide on 3/19/24 and 6/11/24. Completed radiation treatment on 6/27/24     #L inguinal hernia  Pt reports this has been present for some time. Not causing any pain. Not a surgical candidate with current HF status so will not consult surgery at this time.     Hospital Checklist:  DVT Prophylaxis: Heparin gtt  Code Status: Full Code  Bowel regimen: PRN  Diet/Nutrition: 2L fluid and 2G sodium restriction  Lines: Left PIV    Disposition Planning:  Pending RHC results on 9/3    Staffed w/ Dr. Sloan.    Cristobal Fisher MD  Internal Medicine PGY-1  Cardiology II Service     09/02/2024  ==================================    Subjective:   ZULLY Feels like his energy levels and SOB are unchanged today compared to admission.     Objective:     Vitals:    08/30/24 1358 09/01/24 1009 09/02/24 0533   Weight: 66 kg (145 lb 8 oz) 61.9 kg (136 lb 6.4 oz) 62.2 kg (137 lb 3.2 oz)     Vital Signs with Ranges  Temp:  [98.1  F (36.7  C)-98.5  F (36.9  C)] 98.2  F (36.8  C)  Pulse:  [] 95  Resp:  [16-20] 16  BP: ()/() 90/60  SpO2:  [95 %-98 %] 98 %  I/O last 3 completed shifts:  In: 960 [P.O.:960]  Out: 1000 [Urine:1000]    Exam:  General: No acute  distress,   HEENT: MMM  Neck: No apparent JVD visualized  CV: Irregular rhythm, regular rate, no murmurs,   Lungs: On RA, Crackles in bases. No wheezes, no increased WOB  Abd: Soft, non-tender, non-distended  Ext: Warm and well-perfused, no lower extremity edema  Neuro: Awake, alert, conversational, moving all extremities spontaneously throughout examination    Medications   Current Facility-Administered Medications   Medication Dose Route Frequency Provider Last Rate Last Admin    heparin 25,000 units in 0.45% NaCl 250 mL ANTICOAGULANT infusion  0-5,000 Units/hr Intravenous Continuous Cristobal Fisher MD 10.5 mL/hr at 09/02/24 1334 1,050 Units/hr at 09/02/24 1334     Current Facility-Administered Medications   Medication Dose Route Frequency Provider Last Rate Last Admin    amiodarone (PACERONE) tablet 200 mg  200 mg Oral Daily Cristobal Fisher MD   200 mg at 09/02/24 0905    atorvastatin (LIPITOR) tablet 80 mg  80 mg Oral Daily Cristobal Fisher MD   80 mg at 09/02/24 0905    clopidogrel (PLAVIX) tablet 75 mg  75 mg Oral Daily Cristobal Fisher MD   75 mg at 09/02/24 0905    empagliflozin (JARDIANCE) tablet 10 mg  10 mg Oral Daily Cristobal Fisher MD   10 mg at 09/02/24 0904    escitalopram (LEXAPRO) tablet 10 mg  10 mg Oral Daily Cristobal Fisher MD   10 mg at 09/02/24 0904    [START ON 9/3/2024] furosemide (LASIX) injection 40 mg  40 mg Intravenous Daily Danial Fofana MD        [Held by provider] lisinopril (ZESTRIL) tablet 2.5 mg  2.5 mg Oral Daily Cristobal Fisher MD        magnesium oxide (MAG-OX) tablet 400 mg  400 mg Oral Daily Cristobal Fisher MD   400 mg at 09/02/24 0905    [Held by provider] metoprolol succinate ER (TOPROL-XL) 24 hr half-tab 12.5 mg  12.5 mg Oral Daily Cristobal Fisher MD        multivitamin w/minerals (THERA-VIT-M) tablet 1 tablet  1 tablet Oral BID Cristobal Fisher MD   1 tablet at 09/02/24 0904    potassium chloride tray ER (KLOR-CON M10) CR tablet 20 mEq  20 mEq Oral Daily Cristobal Fisher MD    20 mEq at 24 0905    vitamin C (ASCORBIC ACID) tablet 1,000 mg  1,000 mg Oral Daily Cristobal Fisher MD   1,000 mg at 24 0904       Data   Recent Labs   Lab 24  0525 24  0709 24  0414 24  1455   WBC 12.7*  --  10.6 9.8   HGB 9.6*  --  9.8* 10.2*   MCV 96  --  94 97     --  288 256   INR 1.26* 1.30* 1.26* 1.24*   * 137 135 136   POTASSIUM 4.7 4.6 4.6 4.3   CHLORIDE 104 104 104 104   CO2 21* 24 21* 24   BUN 36.3* 30.7* 23.8* 21.9   CR 1.19* 1.21* 1.28* 1.28*   ANIONGAP 9 9 10 8   PRANAV 8.4* 8.4* 8.4* 8.7*   * 95 116* 100*   ALBUMIN 2.8* 3.0*  --  3.2*   PROTTOTAL 6.3* 6.5  --  6.6   BILITOTAL 0.4 0.4  --  0.4   ALKPHOS 110 101  --  113   ALT 29 31  --  39   AST 63* 88*  --  43       Echocardiogram Complete   Result Value    LVEF  15-20% (severely reduced)    Narrative    [id number]  [id number]  [id number]  [id number]^ROXANA^CRISTOBAL     [facility],[place]  Echocardiography Laboratory  [address] [place] [id number]     Name: JOHNSON, DUANE C  MRN: [id number]  : 1950  Study Date: 2024 08:20 AM  Age: 74 yrs  Gender: Male  Patient Location: [facility]  Reason For Study: CHF  Ordering Physician: CRISTOBAL FISHER  Performed By: Yessenia Cano RDCS     BSA: 1.7 m2  Height: 66 in  Weight: 145 lb  HR: 71  BP: 94/73 mmHg  ______________________________________________________________________________  Procedure  Complete Portable Echo Adult. Contrast Optison. Optison (NDC #5515-8288-66)  given intravenously. Patient was given 6 ml mixture of 3 ml Optison and 6 ml  saline. 3 ml wasted.  ______________________________________________________________________________  Interpretation Summary  Left ventricular function is decreased. The ejection fraction is 15-20%  (severely reduced).  Moderate left ventricular dilation is present.  Apical wall akinesis is present.  Severe diffuse hypokinesis is present.  There is no thrombus seen in the left  ventricle.  The right ventricle is normal size.  Global right ventricular function is normal.  Mild functional mitral insufficiency is present.  Mild to moderate tricuspid functional insufficiency is present.  Pulmonary hypertension is present.The right ventricular systolic pressure is  40mmHg above the right atrial pressure.  IVC diameter and respiratory changes fall into an intermediate range  suggesting an RA pressure of 8 mmHg.     This study was compared with the study from 2/19/2024 .MR, TR improved. LVEF  similar in both studies compared side to side. So overall no change in LVEF  ______________________________________________________________________________  Left Ventricle  Left ventricular wall thickness is normal. Moderate left ventricular dilation  is present. Left ventricular diastolic function is not assessable. Left  ventricular function is decreased. The ejection fraction is 15-20% (severely  reduced). Apical wall akinesis is present. Severe diffuse hypokinesis is  present. There is no thrombus seen in the left ventricle.     Right Ventricle  The right ventricle is normal size. Global right ventricular function is  normal. A pacemaker lead is noted in the right ventricle.     Atria  Moderate left atrial enlargement is present. Moderate right atrial enlargement  is present.     Mitral Valve  Mild mitral insufficiency is present.     Aortic Valve  Trileaflet aortic sclerosis without stenosis. On Doppler interrogation, there  is no significant stenosis or regurgitation.     Tricuspid Valve  Mild to moderate tricuspid insufficiency is present. The right ventricular  systolic pressure is approximated at 39.7 mmHg plus the right atrial pressure.  Pulmonary hypertension is present. The right ventricular systolic pressure is  40mmHg above the right atrial pressure.     Pulmonic Valve  On Doppler interrogation, there is no significant stenosis or regurgitation.     Vessels  The aorta root is normal. IVC  diameter and respiratory changes fall into an  intermediate range suggesting an RA pressure of 8 mmHg.     Pericardium  No pericardial effusion is present.     Compared to Previous Study  This study was compared with the study from 2024 . MR, TR improved. LVEF  similar in both studies compared side to side. So overall no change in LVEF.  ______________________________________________________________________________  MMode/2D Measurements & Calculations     IVSd: 0.89 cm  LVIDd: 6.0 cm  LVIDs: 5.5 cm  LVPWd: 0.79 cm  FS: 7.1 %  LV mass(C)d: 197.0 grams  LV mass(C)dI: 112.9 grams/m2  LVOT diam: 2.0 cm  LVOT area: 3.2 cm2  EF Biplane: 16.8 %  LA Volume (BP): 71.3 ml  LA Volume Index (BP): 41.0 ml/m2  RV Base: 4.6 cm  RWT: 0.27     TAPSE: 1.0 cm     Doppler Measurements & Calculations  MV E max ivan: 82.7 cm/sec  LV V1 max P.9 mmHg  LV V1 max: 84.2 cm/sec  LV V1 VTI: 13.8 cm  MR PISA: 4.2 cm2  MR ERO: 0.33 cm2  MR volume: 35.7 ml  SV(LVOT): 43.8 ml  SI(LVOT): 25.1 ml/m2  PA acc time: 0.08 sec  TR max ivan: 315.1 cm/sec  TR max P.7 mmHg  RV S Ivan: 10.7 cm/sec     ______________________________________________________________________________  Report approved by: Jeanine MEJIA 2024 11:05 AM            Examination: XR CHEST 2 VIEWS 2024 3:06 PM     Indication: Shortness of breath     Comparison: Radiograph 2024. CT 2023.     Findings:  PA and lateral views of the chest. Left chest wall implantable cardiac  defibrillator with grossly intact leads/shock coil. Coronary stenting.  Trachea is midline. Stable mild cardiomegaly. Mild blunting of the  left costophrenic angle, likely representing small pleural effusion.  No pneumothorax. No focal consolidation or any definite abnormal  airspace opacities. No acute or suspicious osseous abnormality.  Unremarkable visualized abdomen.      Impression   Impression:   1. Stable mild cardiomegaly with implantable cardiac defibrillator and  coronary  stenting.  2. Small left pleural effusion.     I have personally reviewed the examination and initial interpretation  and I agree with the findings.     PANCHITO EID MD         SYSTEM ID:  [id number]      12/6/2023 CMR  Clinical history: 73 year old with anterior STEMI, cardiac arrest in Oct 2023  Comparison CMR: none  1. The left ventricle is severely enlarged. There is wall thinning and akinesis of the mid-distal anterior,  mid anteroseptum, distal septum and the apex  The global systolic function is severely reduced. The LVEF is 28%.  2. The right ventricle is normal in cavity size. The global systolic function is normal. The RVEF is 52%.   3. The right atrium is normal and the left atrium is severely enlarged.  4. There is mild mitral regurgitation.   5. There is transmural late gadolinium enhancement in mid-distal anterior, mid anteroseptum, distal  septum and the apex consistent with a large infarction in the LAD territory.   6. There is no pericardial effusion.  7. There is no intracardiac thrombus.  8. There are bilateral pleural effusions.  CONCLUSIONS:   Ischemic cardiomyopathy with a large anteroapical infarction.   Severely reduced left ventricular function and normal right ventricular function, LVEF 28% and RVEF 52%.     Cardiac Catheterization:  10/26/23    1st Mrg lesion is 20% stenosed.    Prox LAD to Mid LAD lesion is 100% stenosed.    1st Diag-1 lesion is 80% stenosed.    1st Diag-2 lesion is 50% stenosed.    Dist LAD lesion is 100% stenosed.    RPDA lesion is 70% stenosed.    Dist RCA lesion is 40% stenosed.     Anterior STEMI  Two vessel obstructive CAD (100% pLAD occlusion, 70% rPDA stenosis, 80% diagonal 1 stenosis)  PCI of pLAD to mLAD with BRENDA x 1 (Synergy 2.50x 28 mm) post dilated to 2.75 vessel  CVC placement in RIJ  LVEDP of 26 mmHg  Hemostasis of RRA with TR band      2/19/2024 Echo  Interpretation Summary     1. The left ventricle is moderately dilated. Left ventricular hypertrophy  is  noted by two-dimensional echocardiography. Proximal septal thickening is  noted. Left ventricular systolic function is moderate to severely reduced. The  visual ejection fraction is 25-30%. Biplane LVEF is 25%. Diastolic Doppler  findings (E/E' ratio and/or other parameters) suggest left ventricular filling  pressures are increased. There is severe hypokinesis to akinesis of the mid  anterior/anterolateral/anteroseptal/inferoseptal walls and all apical segments  of the left ventricle. There is no thrombus seen in the left ventricle.  2. The right ventricle is normal size. The right ventricular systolic function  is normal.  3. The left atrium is moderately dilated. Right atrial size is normal. There  is no color Doppler evidence of an atrial shunt.  4. There is moderate to mod-severe (2-3+) mitral regurgitation.  5. There is mild to moderate (1-2+) tricuspid regurgitation.Right ventricular  systolic pressure is elevated, consistent with moderate pulmonary  hypertension.  6. No pericardial effusion.  7. In direct comparison to the previous study dated 10/30/2023, there has been  an interval increase in the degree of mitral regurgitation. The other findings  are similar.     RHC 5/6/24  RA: 10/9/6 mmHg  RV: 61/11 mmHg  PA: 63/24/38 mmHg  PCWP: Unable to obtain accurate measurement  CO/CI (Goldy): 3.89/2.3 L/min/m2    PA sat: 64%  MAP: 85 mmHg       Right sided filling pressures are normal.    Mild elevated pulmonary hypertension.    Normal cardiac output level.        Device interrogated on 8/30/24  Device: [employer] [id number] RESONATE HF ICD  Normal device function.  Mode: DDDR  bpm  AP: 33%  : 3%  Intrinsic rhythm: Afib w/ VS  bpm  Thoracic Impedance: Below reference line, suggests possible intrathoracic fluid accumulation.  Lead Trends Appear Stable.  Estimated battery longevity to RRT = 12.5 years.    Atrial Arrhythmia: AF episode began on 8/28/24  AF [place]: 11%  Anticoagulant:  Eliquis  Ventricular Arrhythmia: None  Setting Changes: Rate response turned ON in device, pacing mode changed to DDDR from DDD, fallback rate during AT/AF episodes 70 bpm.

## 2024-09-02 NOTE — PROGRESS NOTES
D: Pt admitted on 8/30/24 with 2 weeks of worsening fatigue; found to have BNP of 16k and L pleural effusion.      I/A:   Neuro: A&O x4.   Cardiac: afib, HR 90-100s.   Respiratory: Sats >90% on RA, denied SOB  GI/: Minimal UOP. No BM this shift. Good appetite.   IV/Drips: PIV x1. Heparin gtt infusing at 1,050 units/hr. Xa to be checked this evening.   PRNs: Tylenol x1 for back pain, which was effective.   Activity: Up ad mackenzie, steady gait. Used wheelchair to move around unit.        P: NPO at midnight for RHC and stress test tomorrow. Notifying Cards 2 team of changes.

## 2024-09-03 ENCOUNTER — APPOINTMENT (OUTPATIENT)
Dept: CT IMAGING | Facility: CLINIC | Age: 74
DRG: 001 | End: 2024-09-03
Payer: MEDICARE

## 2024-09-03 ENCOUNTER — APPOINTMENT (OUTPATIENT)
Dept: CARDIOLOGY | Facility: CLINIC | Age: 74
DRG: 001 | End: 2024-09-03
Payer: MEDICARE

## 2024-09-03 ENCOUNTER — APPOINTMENT (OUTPATIENT)
Dept: ULTRASOUND IMAGING | Facility: CLINIC | Age: 74
DRG: 001 | End: 2024-09-03
Payer: MEDICARE

## 2024-09-03 LAB
ALBUMIN SERPL BCG-MCNC: 2.9 G/DL (ref 3.5–5.2)
ALBUMIN UR-MCNC: 10 MG/DL
ALP SERPL-CCNC: 98 U/L (ref 40–150)
ALT SERPL W P-5'-P-CCNC: 30 U/L (ref 0–70)
AMPHETAMINES UR QL SCN: NORMAL
ANION GAP SERPL CALCULATED.3IONS-SCNC: 9 MMOL/L (ref 7–15)
APPEARANCE UR: CLEAR
AST SERPL W P-5'-P-CCNC: 44 U/L (ref 0–45)
ATRIAL RATE - MUSE: 68 BPM
BARBITURATES UR QL SCN: NORMAL
BASOPHILS # BLD AUTO: 0 10E3/UL (ref 0–0.2)
BASOPHILS NFR BLD AUTO: 0 %
BENZODIAZ UR QL SCN: NORMAL
BILIRUB SERPL-MCNC: 0.3 MG/DL
BILIRUB UR QL STRIP: NEGATIVE
BUN SERPL-MCNC: 35.4 MG/DL (ref 8–23)
BZE UR QL SCN: NORMAL
CALCIUM SERPL-MCNC: 8.1 MG/DL (ref 8.8–10.4)
CANNABINOIDS UR QL SCN: NORMAL
CHLORIDE SERPL-SCNC: 102 MMOL/L (ref 98–107)
COLOR UR AUTO: YELLOW
CREAT SERPL-MCNC: 1.19 MG/DL (ref 0.67–1.17)
DIASTOLIC BLOOD PRESSURE - MUSE: NORMAL MMHG
EGFRCR SERPLBLD CKD-EPI 2021: 64 ML/MIN/1.73M2
EOSINOPHIL # BLD AUTO: 0 10E3/UL (ref 0–0.7)
EOSINOPHIL NFR BLD AUTO: 0 %
ERYTHROCYTE [DISTWIDTH] IN BLOOD BY AUTOMATED COUNT: 15.7 % (ref 10–15)
FENTANYL UR QL: NORMAL
GLUCOSE SERPL-MCNC: 105 MG/DL (ref 70–99)
GLUCOSE UR STRIP-MCNC: >=1000 MG/DL
HCO3 SERPL-SCNC: 21 MMOL/L (ref 22–29)
HCT VFR BLD AUTO: 28 % (ref 40–53)
HGB BLD-MCNC: 9 G/DL (ref 13.3–17.7)
HGB BLD-MCNC: 9.4 G/DL (ref 13.3–17.7)
HGB UR QL STRIP: NEGATIVE
HOLD SPECIMEN: NORMAL
IMM GRANULOCYTES # BLD: 0.1 10E3/UL
IMM GRANULOCYTES NFR BLD: 1 %
INR PPP: 1.3 (ref 0.85–1.15)
INTERPRETATION ECG - MUSE: NORMAL
KETONES UR STRIP-MCNC: NEGATIVE MG/DL
LEUKOCYTE ESTERASE UR QL STRIP: NEGATIVE
LYMPHOCYTES # BLD AUTO: 0.5 10E3/UL (ref 0.8–5.3)
LYMPHOCYTES NFR BLD AUTO: 4 %
MAGNESIUM SERPL-MCNC: 2.2 MG/DL (ref 1.7–2.3)
MCH RBC QN AUTO: 30.7 PG (ref 26.5–33)
MCHC RBC AUTO-ENTMCNC: 32.1 G/DL (ref 31.5–36.5)
MCV RBC AUTO: 96 FL (ref 78–100)
MONOCYTES # BLD AUTO: 0.2 10E3/UL (ref 0–1.3)
MONOCYTES NFR BLD AUTO: 2 %
MUCOUS THREADS #/AREA URNS LPF: PRESENT /LPF
NEUTROPHILS # BLD AUTO: 11.9 10E3/UL (ref 1.6–8.3)
NEUTROPHILS NFR BLD AUTO: 93 %
NITRATE UR QL: NEGATIVE
NRBC # BLD AUTO: 0 10E3/UL
NRBC BLD AUTO-RTO: 0 /100
OPIATES UR QL SCN: NORMAL
OXYHGB MFR BLDV: 48 % (ref 70–75)
P AXIS - MUSE: NORMAL DEGREES
PCP QUAL URINE (ROCHE): NORMAL
PH UR STRIP: 5.5 [PH] (ref 5–7)
PLATELET # BLD AUTO: 290 10E3/UL (ref 150–450)
POTASSIUM SERPL-SCNC: 4.5 MMOL/L (ref 3.4–5.3)
POTASSIUM SERPL-SCNC: 4.5 MMOL/L (ref 3.4–5.3)
PR INTERVAL - MUSE: NORMAL MS
PROT SERPL-MCNC: 6.1 G/DL (ref 6.4–8.3)
QRS DURATION - MUSE: 168 MS
QT - MUSE: 414 MS
QTC - MUSE: 570 MS
R AXIS - MUSE: -87 DEGREES
RBC # BLD AUTO: 2.93 10E6/UL (ref 4.4–5.9)
RBC URINE: <1 /HPF
SODIUM SERPL-SCNC: 132 MMOL/L (ref 135–145)
SP GR UR STRIP: 1.02 (ref 1–1.03)
SYSTOLIC BLOOD PRESSURE - MUSE: NORMAL MMHG
T AXIS - MUSE: -7 DEGREES
TRANSITIONAL EPI: <1 /HPF
UFH PPP CHRO-ACNC: 0.35 IU/ML
UFH PPP CHRO-ACNC: 0.38 IU/ML
UROBILINOGEN UR STRIP-MCNC: NORMAL MG/DL
VENTRICULAR RATE- MUSE: 114 BPM
WBC # BLD AUTO: 12.7 10E3/UL (ref 4–11)
WBC URINE: 2 /HPF

## 2024-09-03 PROCEDURE — 84132 ASSAY OF SERUM POTASSIUM: CPT

## 2024-09-03 PROCEDURE — 93925 LOWER EXTREMITY STUDY: CPT | Mod: 26 | Performed by: STUDENT IN AN ORGANIZED HEALTH CARE EDUCATION/TRAINING PROGRAM

## 2024-09-03 PROCEDURE — 250N000011 HC RX IP 250 OP 636

## 2024-09-03 PROCEDURE — 70450 CT HEAD/BRAIN W/O DYE: CPT | Mod: 26 | Performed by: RADIOLOGY

## 2024-09-03 PROCEDURE — 250N000009 HC RX 250: Performed by: INTERNAL MEDICINE

## 2024-09-03 PROCEDURE — C1751 CATH, INF, PER/CENT/MIDLINE: HCPCS | Performed by: INTERNAL MEDICINE

## 2024-09-03 PROCEDURE — 93925 LOWER EXTREMITY STUDY: CPT

## 2024-09-03 PROCEDURE — 85520 HEPARIN ASSAY: CPT | Performed by: INTERNAL MEDICINE

## 2024-09-03 PROCEDURE — 250N000013 HC RX MED GY IP 250 OP 250 PS 637

## 2024-09-03 PROCEDURE — 4A023N6 MEASUREMENT OF CARDIAC SAMPLING AND PRESSURE, RIGHT HEART, PERCUTANEOUS APPROACH: ICD-10-PCS | Performed by: INTERNAL MEDICINE

## 2024-09-03 PROCEDURE — 71250 CT THORAX DX C-: CPT | Mod: 26 | Performed by: RADIOLOGY

## 2024-09-03 PROCEDURE — 94621 CARDIOPULM EXERCISE TESTING: CPT

## 2024-09-03 PROCEDURE — 71250 CT THORAX DX C-: CPT | Mod: MG

## 2024-09-03 PROCEDURE — G1010 CDSM STANSON: HCPCS

## 2024-09-03 PROCEDURE — 93451 RIGHT HEART CATH: CPT | Performed by: INTERNAL MEDICINE

## 2024-09-03 PROCEDURE — 93451 RIGHT HEART CATH: CPT | Mod: 26 | Performed by: INTERNAL MEDICINE

## 2024-09-03 PROCEDURE — 94621 CARDIOPULM EXERCISE TESTING: CPT | Mod: 26 | Performed by: INTERNAL MEDICINE

## 2024-09-03 PROCEDURE — 99233 SBSQ HOSP IP/OBS HIGH 50: CPT | Mod: 25 | Performed by: INTERNAL MEDICINE

## 2024-09-03 PROCEDURE — 70486 CT MAXILLOFACIAL W/O DYE: CPT | Mod: MG

## 2024-09-03 PROCEDURE — 74176 CT ABD & PELVIS W/O CONTRAST: CPT | Mod: 26 | Performed by: RADIOLOGY

## 2024-09-03 PROCEDURE — 83735 ASSAY OF MAGNESIUM: CPT

## 2024-09-03 PROCEDURE — 272N000001 HC OR GENERAL SUPPLY STERILE: Performed by: INTERNAL MEDICINE

## 2024-09-03 PROCEDURE — 80307 DRUG TEST PRSMV CHEM ANLYZR: CPT

## 2024-09-03 PROCEDURE — 36415 COLL VENOUS BLD VENIPUNCTURE: CPT

## 2024-09-03 PROCEDURE — 120N000005 HC R&B MS OVERFLOW UMMC

## 2024-09-03 PROCEDURE — 82810 BLOOD GASES O2 SAT ONLY: CPT

## 2024-09-03 PROCEDURE — G1010 CDSM STANSON: HCPCS | Performed by: RADIOLOGY

## 2024-09-03 PROCEDURE — 81001 URINALYSIS AUTO W/SCOPE: CPT

## 2024-09-03 PROCEDURE — 85610 PROTHROMBIN TIME: CPT

## 2024-09-03 PROCEDURE — 85520 HEPARIN ASSAY: CPT

## 2024-09-03 PROCEDURE — 70486 CT MAXILLOFACIAL W/O DYE: CPT | Mod: 26 | Performed by: RADIOLOGY

## 2024-09-03 PROCEDURE — 85025 COMPLETE CBC W/AUTO DIFF WBC: CPT

## 2024-09-03 PROCEDURE — 85018 HEMOGLOBIN: CPT

## 2024-09-03 RX ORDER — DIGOXIN 125 MCG
250 TABLET ORAL ONCE
Status: COMPLETED | OUTPATIENT
Start: 2024-09-03 | End: 2024-09-03

## 2024-09-03 RX ORDER — DIGOXIN 125 MCG
250 TABLET ORAL DAILY
Status: DISCONTINUED | OUTPATIENT
Start: 2024-09-04 | End: 2024-09-05

## 2024-09-03 RX ORDER — DIGOXIN 125 MCG
500 TABLET ORAL ONCE
Status: COMPLETED | OUTPATIENT
Start: 2024-09-03 | End: 2024-09-03

## 2024-09-03 RX ORDER — DIGOXIN 125 MCG
125 TABLET ORAL ONCE
Status: COMPLETED | OUTPATIENT
Start: 2024-09-04 | End: 2024-09-04

## 2024-09-03 RX ADMIN — ESCITALOPRAM OXALATE 10 MG: 10 TABLET ORAL at 09:51

## 2024-09-03 RX ADMIN — ATORVASTATIN CALCIUM 80 MG: 80 TABLET, FILM COATED ORAL at 09:00

## 2024-09-03 RX ADMIN — OXYCODONE HYDROCHLORIDE AND ACETAMINOPHEN 1000 MG: 500 TABLET ORAL at 09:00

## 2024-09-03 RX ADMIN — CLOPIDOGREL BISULFATE 75 MG: 75 TABLET ORAL at 09:51

## 2024-09-03 RX ADMIN — DIGOXIN 500 MCG: 125 TABLET ORAL at 17:59

## 2024-09-03 RX ADMIN — FUROSEMIDE 40 MG: 10 INJECTION, SOLUTION INTRAVENOUS at 08:59

## 2024-09-03 RX ADMIN — DIGOXIN 250 MCG: 125 TABLET ORAL at 23:22

## 2024-09-03 RX ADMIN — AMIODARONE HYDROCHLORIDE 200 MG: 200 TABLET ORAL at 09:51

## 2024-09-03 RX ADMIN — Medication 1 TABLET: at 20:29

## 2024-09-03 RX ADMIN — EMPAGLIFLOZIN 10 MG: 10 TABLET, FILM COATED ORAL at 09:50

## 2024-09-03 RX ADMIN — HEPARIN SODIUM 1200 UNITS/HR: 10000 INJECTION, SOLUTION INTRAVENOUS at 09:49

## 2024-09-03 RX ADMIN — MAGNESIUM OXIDE TAB 400 MG (241.3 MG ELEMENTAL MG) 400 MG: 400 (241.3 MG) TAB at 09:00

## 2024-09-03 RX ADMIN — Medication 6.25 MG: at 21:07

## 2024-09-03 RX ADMIN — Medication 1 TABLET: at 09:00

## 2024-09-03 RX ADMIN — Medication 5 MG: at 23:22

## 2024-09-03 RX ADMIN — POTASSIUM CHLORIDE 20 MEQ: 750 TABLET, EXTENDED RELEASE ORAL at 09:00

## 2024-09-03 ASSESSMENT — ACTIVITIES OF DAILY LIVING (ADL)
ADLS_ACUITY_SCORE: 24

## 2024-09-03 NOTE — PLAN OF CARE
Heart Failure Care Map  GOALS TO BE MET BEFORE DISCHARGE:    1. Decrease congestion and/or edema with diuretic therapy to achieve near optimal volume status.     Dyspnea improved: Yes, satisfactory for discharge.   Edema improved: Yes, satisfactory for discharge.        Last 24 hour I/O:   Intake/Output Summary (Last 24 hours) at 9/3/2024 1059  Last data filed at 9/3/2024 0949  Gross per 24 hour   Intake 1190.76 ml   Output 1625 ml   Net -434.24 ml           Net I/O and Weights since admission:   08/04 1500 - 09/03 1459  In: 2696.31 [P.O.:2156; I.V.:540.31]  Out: 3375 [Urine:3375]  Net: -678.69     Vitals:    08/30/24 1358 09/01/24 1009 09/02/24 0533 09/03/24 0600   Weight: 66 kg (145 lb 8 oz) 61.9 kg (136 lb 6.4 oz) 62.2 kg (137 lb 3.2 oz) 62.1 kg (136 lb 12.8 oz)       2.  O2 sats > 90% on room air, or at prior home O2 therapy level.      Able to wean O2 this shift to keep sats above 90%?: Yes, satisfactory for discharge.   Does patient use Home O2? No          Current oxygenation status:   SpO2: 98 %     O2 Device: None (Room air),      3.  Tolerates ambulation and mobility near baseline.     Ambulation: No, further care required to meet this goal. Please explain patient uses wheelchair to go to the toilet   Times patient ambulated with staff this shift: 0, patient has 2 test/procedures scheduled.     Please review the Heart Failure Care Map for additional HF goal outcomes.    Sara Tenorio RN  9/3/2024      Goal Outcome Evaluation:      Plan of Care Reviewed With: patient    Overall Patient Progress: no changeOverall Patient Progress: no change    Outcome Evaluation: Remains in Afib, denies chest pain and SOB, test/procedures to be done today

## 2024-09-03 NOTE — TELEPHONE ENCOUNTER
Called [facility] with the following message:        Please let them know that I am in touch with the inpatient cardiologist Dr. Sloan regarding Mr Aguila. Our mutual goal is to get fluid off, get RHC and reevaluate candidacy for next options. I will meet with them in hospital on Friday     She stated understanding and appreciation of the message.

## 2024-09-03 NOTE — PROGRESS NOTES
[facility]  Cardiology II Service / Advanced Heart Failure  Daily Progress Note    Patient: Duane C Johnson      : 1950      MRN: [id number]    Assessment/Plan:   Duane C Johnson is a 74 year old male pmhx of anterior STEMI s/p PCI to the pLAD on 10/26/23 with a VT/VF arrest the next day (10/27) with patent stents and unchanged anatomy on repeat angiogram. Placed on amiodarone and discharged 23, ICD was not indicated as this was within 48h of his MI. His EF prior to discharge was 20-30% with a large area of akinesis in the LAD, placed on apixaban due to this (Apixaban dcd on 24). Has had multiple admissions for HF exacerbation last year.  Admitted on 24. He presents for 2 weeks of worsening fatigue; found to have BNP of 16k and L pleural effusion. Admitted for diuresis and RHC.     Today's changes:   -Continue heparin gtt   - Continue Lasix 40mg IV daily   - RHC and CPX done today (see results)   - Starting Captopril 6.25 TID and Digoxin loading with 875mcg, then 250mcg a day   - Starting evaluation for LVAD   - EP consulted for possible cardioversion    # Acute heart failure exacerbation  #Possible ambulatory shock  # HFrEf 2/2 ICM (EF 20-30% by TTE 10/23)  #Afib  Fluid status: Hypervolemic  ACEi/ARB/ARNi/afterload reduction: lisinopril 2.5 mg daily, hold for SBP < 90  BB: metoprolol succinate 12.5 mg HS, hold for SBP < 90, HR < 50  Aldosterone antagonist: unable to start d/t hypotension   SGLT2i: Empagliflozin 10mg  SCD prophylaxis: s/p ICD 2024  NSAID use: contraindicated  Antiarrhythmic: Amiodarone 200mg   Diuretic: Was on Bumex 1mg daily; this was changed to PRN on 24 when Empagliflozin was started    Echo from  with EF 15-20% and trace MR. See below for full report.    BP as low as 84/62 (MAP 69), has remained asymptomatic other than fatigue and mild SOB.    - BNP of 16k on admission with L sided pleural effusion   - Holding GDMT  while diuresing because of soft pressures.    - Low intensity heparin gtt    - Continue 40mg IV Lasix   - RHC and CPX performed today    - Starting Captopril 6.25 TID, Digoxin loading with 875mcg    - Starting evaluation for LVAD      # CAD s/p PCI to LAD  # STEMI  # VT/VF arrest  # ICD placed on 5/24  - PTA Plavix, Amio 200, Magnesium 400, Potassium 20  - Mag and Potassium replacement per nursing protocol  - Was on apixaban for low EF (akinesis of the mid-distal anterior,mid anteroseptum, distal septum and the apex) and questionable history of Afib. Since there was no episode of Afib captured apixaban was stopped on 7/5/24.     #DOMENICA  Baseline Cr normal at ~1. Cr of 1.55 on 8/24 with this acute episode of HF. Concern for type 1 cardiorenal syndrome. uPCR negative. Improving.    - Diuresis as above  - avoid nephrotoxins     ================================  Chronic Problems:    #Prostate Cancer  Diagnosed in March 2023. Underwent Leuprolide on 3/19/24 and 6/11/24. Completed radiation treatment on 6/27/24     #L inguinal hernia  Pt reports this has been present for some time. Not causing any pain. Not a surgical candidate with current HF status so will not consult surgery at this time.     Hospital Checklist:  DVT Prophylaxis: Heparin gtt  Code Status: Full Code  Bowel regimen: PRN  Diet/Nutrition: 2L fluid and 2G sodium restriction  Lines: Left PIV    Disposition Planning:  3-5 days for LVAD evaluation    Staffed w/ Dr. Sloan.    Cristobal Fisher MD  Internal Medicine PGY-1  Cardiology II Service     09/03/2024  ==================================    Subjective:   ZULLY Feels like his energy levels and SOB are unchanged today compared to admission.     Objective:     Vitals:    09/02/24 0533 09/03/24 0600 09/03/24 1356   Weight: 62.2 kg (137 lb 3.2 oz) 62.1 kg (136 lb 12.8 oz) 62.1 kg (136 lb 14.5 oz)     Vital Signs with Ranges  Temp:  [97.7  F (36.5  C)-99.1  F (37.3  C)] 98.8  F (37.1  C)  Pulse:  []  96  Resp:  [16-21] 20  BP: (87-99)/(61-80) 99/61  SpO2:  [91 %-99 %] 91 %  I/O last 3 completed shifts:  In: 770.96 [P.O.:420; I.V.:350.96]  Out: 1625 [Urine:1625]    Exam:  General: No acute distress,   HEENT: MMM  Neck: No apparent JVD visualized  CV: Irregular rhythm, regular rate, no murmurs,   Lungs: On RA, Crackles improved. No wheezes, no increased WOB  Abd: Soft, non-tender, non-distended  Ext: Warm and well-perfused, no lower extremity edema  Neuro: Awake, alert, conversational, moving all extremities spontaneously throughout examination    Medications   Current Facility-Administered Medications   Medication Dose Route Frequency Provider Last Rate Last Admin    heparin 25,000 units in 0.45% NaCl 250 mL ANTICOAGULANT infusion  0-5,000 Units/hr Intravenous Continuous Cristobal Fisher MD 12 mL/hr at 09/03/24 1600 1,200 Units/hr at 09/03/24 1600     Current Facility-Administered Medications   Medication Dose Route Frequency Provider Last Rate Last Admin    amiodarone (PACERONE) tablet 200 mg  200 mg Oral Daily Cristobal Fisher MD   200 mg at 09/03/24 0951    atorvastatin (LIPITOR) tablet 80 mg  80 mg Oral Daily Cristobal Fisher MD   80 mg at 09/03/24 0900    captopril (CAPOTEN) half-tab 6.25 mg  6.25 mg Oral TID AC Cristobal Fisher MD        clopidogrel (PLAVIX) tablet 75 mg  75 mg Oral Daily Cristobal Fisher MD   75 mg at 09/03/24 0951    [START ON 9/4/2024] digoxin (LANOXIN) tablet 125 mcg  125 mcg Oral Once Cristobal Fisher MD        digoxin (LANOXIN) tablet 250 mcg  250 mcg Oral Once Cristobal Fisher MD        digoxin (LANOXIN) tablet 500 mcg  500 mcg Oral Once Cristobal Fisher MD        empagliflozin (JARDIANCE) tablet 10 mg  10 mg Oral Daily Cristobal Fisher MD   10 mg at 09/03/24 0950    escitalopram (LEXAPRO) tablet 10 mg  10 mg Oral Daily Cristobal Fisher MD   10 mg at 09/03/24 0951    furosemide (LASIX) injection 40 mg  40 mg Intravenous Daily Danial Fofana MD   40 mg at 09/03/24 0859    [Held by provider]  lisinopril (ZESTRIL) tablet 2.5 mg  2.5 mg Oral Daily Cristobal Fisher MD        magnesium oxide (MAG-OX) tablet 400 mg  400 mg Oral Daily Cristobal Fisher MD   400 mg at 24 0900    [Held by provider] metoprolol succinate ER (TOPROL-XL) 24 hr half-tab 12.5 mg  12.5 mg Oral Daily Cristobal Fisher MD        multivitamin w/minerals (THERA-VIT-M) tablet 1 tablet  1 tablet Oral BID Cristobal Fisher MD   1 tablet at 24 0900    potassium chloride tray ER (KLOR-CON M10) CR tablet 20 mEq  20 mEq Oral Daily Cristobal Fisher MD   20 mEq at 24 0900    vitamin C (ASCORBIC ACID) tablet 1,000 mg  1,000 mg Oral Daily Cristobal Fisher MD   1,000 mg at 24 0900       Data   Recent Labs   Lab 24  1458 24  0552 24  0440 24  0525 24  0709 24  0414   WBC  --  12.7*  --  12.7*  --  10.6   HGB 9.4* 9.0*  --  9.6*  --  9.8*   MCV  --  96  --  96  --  94   PLT  --  290  --  329  --  288   INR  --   --  1.30* 1.26* 1.30* 1.26*   NA  --  132*  --  134* 137 135   POTASSIUM  --  4.5 4.5 4.7 4.6 4.6   CHLORIDE  --  102  --  104 104 104   CO2  --  21*  --  21* 24 21*   BUN  --  35.4*  --  36.3* 30.7* 23.8*   CR  --  1.19*  --  1.19* 1.21* 1.28*   ANIONGAP  --  9  --  9 9 10   PRANAV  --  8.1*  --  8.4* 8.4* 8.4*   GLC  --  105*  --  108* 95 116*   ALBUMIN  --  2.9*  --  2.8* 3.0*  --    PROTTOTAL  --  6.1*  --  6.3* 6.5  --    BILITOTAL  --  0.3  --  0.4 0.4  --    ALKPHOS  --  98  --  110 101  --    ALT  --  30  --  29 31  --    AST  --  44  --  63* 88*  --        Echocardiogram Complete   Result Value    LVEF  15-20% (severely reduced)    Narrative    [id number]  [id number]  [id number]  [id number]^ROXANA^CRISTOBAL     [facility],[place]  Echocardiography Laboratory  [address] [place] [id number]     Name: JOHNSON, DUANE C  MRN: [id number]  : 1950  Study Date: 2024 08:20 AM  Age: 74 yrs  Gender: Male  Patient Location: [facility]  Reason For Study: CHF  Ordering  Physician: SONIA SCHMIDT  Performed By: Yessenia Caon RDCS     BSA: 1.7 m2  Height: 66 in  Weight: 145 lb  HR: 71  BP: 94/73 mmHg  ______________________________________________________________________________  Procedure  Complete Portable Echo Adult. Contrast Optison. Optison (NDC #4445-8467-86)  given intravenously. Patient was given 6 ml mixture of 3 ml Optison and 6 ml  saline. 3 ml wasted.  ______________________________________________________________________________  Interpretation Summary  Left ventricular function is decreased. The ejection fraction is 15-20%  (severely reduced).  Moderate left ventricular dilation is present.  Apical wall akinesis is present.  Severe diffuse hypokinesis is present.  There is no thrombus seen in the left ventricle.  The right ventricle is normal size.  Global right ventricular function is normal.  Mild functional mitral insufficiency is present.  Mild to moderate tricuspid functional insufficiency is present.  Pulmonary hypertension is present.The right ventricular systolic pressure is  40mmHg above the right atrial pressure.  IVC diameter and respiratory changes fall into an intermediate range  suggesting an RA pressure of 8 mmHg.     This study was compared with the study from 2/19/2024 .MR, TR improved. LVEF  similar in both studies compared side to side. So overall no change in LVEF  ______________________________________________________________________________  Left Ventricle  Left ventricular wall thickness is normal. Moderate left ventricular dilation  is present. Left ventricular diastolic function is not assessable. Left  ventricular function is decreased. The ejection fraction is 15-20% (severely  reduced). Apical wall akinesis is present. Severe diffuse hypokinesis is  present. There is no thrombus seen in the left ventricle.     Right Ventricle  The right ventricle is normal size. Global right ventricular function is  normal. A pacemaker lead is noted in the  right ventricle.     Atria  Moderate left atrial enlargement is present. Moderate right atrial enlargement  is present.     Mitral Valve  Mild mitral insufficiency is present.     Aortic Valve  Trileaflet aortic sclerosis without stenosis. On Doppler interrogation, there  is no significant stenosis or regurgitation.     Tricuspid Valve  Mild to moderate tricuspid insufficiency is present. The right ventricular  systolic pressure is approximated at 39.7 mmHg plus the right atrial pressure.  Pulmonary hypertension is present. The right ventricular systolic pressure is  40mmHg above the right atrial pressure.     Pulmonic Valve  On Doppler interrogation, there is no significant stenosis or regurgitation.     Vessels  The aorta root is normal. IVC diameter and respiratory changes fall into an  intermediate range suggesting an RA pressure of 8 mmHg.     Pericardium  No pericardial effusion is present.     Compared to Previous Study  This study was compared with the study from 2024 . MR, TR improved. LVEF  similar in both studies compared side to side. So overall no change in LVEF.  ______________________________________________________________________________  MMode/2D Measurements & Calculations     IVSd: 0.89 cm  LVIDd: 6.0 cm  LVIDs: 5.5 cm  LVPWd: 0.79 cm  FS: 7.1 %  LV mass(C)d: 197.0 grams  LV mass(C)dI: 112.9 grams/m2  LVOT diam: 2.0 cm  LVOT area: 3.2 cm2  EF Biplane: 16.8 %  LA Volume (BP): 71.3 ml  LA Volume Index (BP): 41.0 ml/m2  RV Base: 4.6 cm  RWT: 0.27     TAPSE: 1.0 cm     Doppler Measurements & Calculations  MV E max ivan: 82.7 cm/sec  LV V1 max P.9 mmHg  LV V1 max: 84.2 cm/sec  LV V1 VTI: 13.8 cm  MR PISA: 4.2 cm2  MR ERO: 0.33 cm2  MR volume: 35.7 ml  SV(LVOT): 43.8 ml  SI(LVOT): 25.1 ml/m2  PA acc time: 0.08 sec  TR max ivan: 315.1 cm/sec  TR max P.7 mmHg  RV S Ivan: 10.7 cm/sec     ______________________________________________________________________________  Report approved by: JAMES  Jeanine NEVAREZ 08/31/2024 11:05 AM            Examination: XR CHEST 2 VIEWS 8/30/2024 3:06 PM     Indication: Shortness of breath     Comparison: Radiograph 8/24/2024. CT 12/25/2023.     Findings:  PA and lateral views of the chest. Left chest wall implantable cardiac  defibrillator with grossly intact leads/shock coil. Coronary stenting.  Trachea is midline. Stable mild cardiomegaly. Mild blunting of the  left costophrenic angle, likely representing small pleural effusion.  No pneumothorax. No focal consolidation or any definite abnormal  airspace opacities. No acute or suspicious osseous abnormality.  Unremarkable visualized abdomen.      Impression   Impression:   1. Stable mild cardiomegaly with implantable cardiac defibrillator and  coronary stenting.  2. Small left pleural effusion.     I have personally reviewed the examination and initial interpretation  and I agree with the findings.     PANCHITO EID MD         SYSTEM ID:  [id number]      12/6/2023 CMR  Clinical history: 73 year old with anterior STEMI, cardiac arrest in Oct 2023  Comparison CMR: none  1. The left ventricle is severely enlarged. There is wall thinning and akinesis of the mid-distal anterior,  mid anteroseptum, distal septum and the apex  The global systolic function is severely reduced. The LVEF is 28%.  2. The right ventricle is normal in cavity size. The global systolic function is normal. The RVEF is 52%.   3. The right atrium is normal and the left atrium is severely enlarged.  4. There is mild mitral regurgitation.   5. There is transmural late gadolinium enhancement in mid-distal anterior, mid anteroseptum, distal  septum and the apex consistent with a large infarction in the LAD territory.   6. There is no pericardial effusion.  7. There is no intracardiac thrombus.  8. There are bilateral pleural effusions.  CONCLUSIONS:   Ischemic cardiomyopathy with a large anteroapical infarction.   Severely reduced left ventricular function and  normal right ventricular function, LVEF 28% and RVEF 52%.     Cardiac Catheterization:  10/26/23    1st Mrg lesion is 20% stenosed.    Prox LAD to Mid LAD lesion is 100% stenosed.    1st Diag-1 lesion is 80% stenosed.    1st Diag-2 lesion is 50% stenosed.    Dist LAD lesion is 100% stenosed.    RPDA lesion is 70% stenosed.    Dist RCA lesion is 40% stenosed.     Anterior STEMI  Two vessel obstructive CAD (100% pLAD occlusion, 70% rPDA stenosis, 80% diagonal 1 stenosis)  PCI of pLAD to mLAD with BRENDA x 1 (Synergy 2.50x 28 mm) post dilated to 2.75 vessel  CVC placement in RIJ  LVEDP of 26 mmHg  Hemostasis of RRA with TR band      2/19/2024 Echo  Interpretation Summary     1. The left ventricle is moderately dilated. Left ventricular hypertrophy is  noted by two-dimensional echocardiography. Proximal septal thickening is  noted. Left ventricular systolic function is moderate to severely reduced. The  visual ejection fraction is 25-30%. Biplane LVEF is 25%. Diastolic Doppler  findings (E/E' ratio and/or other parameters) suggest left ventricular filling  pressures are increased. There is severe hypokinesis to akinesis of the mid  anterior/anterolateral/anteroseptal/inferoseptal walls and all apical segments  of the left ventricle. There is no thrombus seen in the left ventricle.  2. The right ventricle is normal size. The right ventricular systolic function  is normal.  3. The left atrium is moderately dilated. Right atrial size is normal. There  is no color Doppler evidence of an atrial shunt.  4. There is moderate to mod-severe (2-3+) mitral regurgitation.  5. There is mild to moderate (1-2+) tricuspid regurgitation.Right ventricular  systolic pressure is elevated, consistent with moderate pulmonary  hypertension.  6. No pericardial effusion.  7. In direct comparison to the previous study dated 10/30/2023, there has been  an interval increase in the degree of mitral regurgitation. The other findings  are similar.      RHC 5/6/24  RA: 10/9/6 mmHg  RV: 61/11 mmHg  PA: 63/24/38 mmHg  PCWP: Unable to obtain accurate measurement  CO/CI (Goldy): 3.89/2.3 L/min/m2    PA sat: 64%  MAP: 85 mmHg       Right sided filling pressures are normal.    Mild elevated pulmonary hypertension.    Normal cardiac output level.        Device interrogated on 8/30/24  Device: [employer] [id number] RESONATE HF ICD  Normal device function.  Mode: DDDR  bpm  AP: 33%  : 3%  Intrinsic rhythm: Afib w/ VS  bpm  Thoracic Impedance: Below reference line, suggests possible intrathoracic fluid accumulation.  Lead Trends Appear Stable.  Estimated battery longevity to RRT = 12.5 years.    Atrial Arrhythmia: AF episode began on 8/28/24  AF [place]: 11%  Anticoagulant: Eliquis  Ventricular Arrhythmia: None  Setting Changes: Rate response turned ON in device, pacing mode changed to DDDR from DDD, fallback rate during AT/AF episodes 70 bpm.

## 2024-09-03 NOTE — PLAN OF CARE
Shift: [id number]  Neuro: A&O x4. Able to make needs known.   Respiratory: SpO2 >95% on RA. No cough. Denies SOB.   Cardiac: Afebrile. A-FIB HR 90'S-100's. Denies chest pain.   GI/: Voids spontaneously. No BM this shift.   Diet: 2gm Na, 2L FR. NPO @ midnight for RHC and Stress Test.  Pain:Denies.   IV Access: L arm PIV infusing heparin gtt at 1200 units/hr.   Labs: Next Anti Xa @ 0359  Activity: Independent/SBA.    New changes this shift:None.   Plan: Continue POC. Contact CARDS 2 with any concerns/questions.

## 2024-09-03 NOTE — PROGRESS NOTES
"I: Monitored vitals and assessed pt status.   Changes during this shift: stress test and RHC completed today, pending dietician consult, work up for LVAD, pending abdominal ultrasound    Running: Heparin 1200 unit(s)/hr     Vitals: BP 93/63 (BP Location: Left arm)   Pulse 98   Temp 98.8  F (37.1  C) (Oral)   Resp 20   Ht 1.676 m (5' 5.98\")   Wt 62.1 kg (136 lb 14.5 oz)   SpO2 (!) 85%   BMI 22.11 kg/m      A:   Neuro: Ax4 denies headache, dizziness, lightheadedness, numbness and tingling. calls appropriately   Cardiac: Afib 90-110s, SBPs 90  Afebrile, VSS. Denies chest pain.   Respiratory: sating >95 on room air. denies SOB. LS diminished on bases  Diet/appetite: NPO for procedures - 2 Gm Na diet 2L FR. Poor appetite, poor dentition. Placed nutritional consult. Encouraged to eat/order meal but patient wanted to wait for dietician to see him, explained about need for diet and eventually ordered food.   GI/:  No BM this shift. Denies abdominal pain. Adequate urine output.   Activity: SBA-A1  Skin: WNL, Warm, dry, normal color  Line:  PIV L forearm running heparin drip    "

## 2024-09-03 NOTE — PLAN OF CARE
[id number]:     Neuro: A/Ox4.  Cardiac: A Fib with HR . BP 95/62   Respiratory: O2 sats stable on RA  GI/: Voiding in urinal at bedside, no BM x 3 days  Diet/appetite: Fair po intake, pt states not hungry, but c/o not getting enough food. NPO after MN  Activity:  Up with SBA  Pain: Denies pain.   Skin: Intact, no new deficits noted.  LDA's: PIV- Heparin gtt at 1200 units/hr. Xa pending.     Plan: RHC and stress test today. Continue with POC. Notify primary team with changes.

## 2024-09-04 ENCOUNTER — APPOINTMENT (OUTPATIENT)
Dept: OCCUPATIONAL THERAPY | Facility: CLINIC | Age: 74
DRG: 001 | End: 2024-09-04
Payer: MEDICARE

## 2024-09-04 ENCOUNTER — APPOINTMENT (OUTPATIENT)
Dept: ULTRASOUND IMAGING | Facility: CLINIC | Age: 74
DRG: 001 | End: 2024-09-04
Payer: MEDICARE

## 2024-09-04 ENCOUNTER — PREP FOR PROCEDURE (OUTPATIENT)
Dept: CARDIOLOGY | Facility: CLINIC | Age: 74
End: 2024-09-04

## 2024-09-04 DIAGNOSIS — I25.5 ISCHEMIC CARDIOMYOPATHY: Primary | ICD-10-CM

## 2024-09-04 LAB
ABO/RH(D): NORMAL
ALBUMIN SERPL BCG-MCNC: 2.6 G/DL (ref 3.5–5.2)
ALP SERPL-CCNC: 91 U/L (ref 40–150)
ALT SERPL W P-5'-P-CCNC: 22 U/L (ref 0–70)
ANION GAP SERPL CALCULATED.3IONS-SCNC: 12 MMOL/L (ref 7–15)
ANTIBODY SCREEN: NEGATIVE
APTT PPP: 57 SECONDS (ref 22–38)
AST SERPL W P-5'-P-CCNC: 32 U/L (ref 0–45)
BASOPHILS # BLD AUTO: 0 10E3/UL (ref 0–0.2)
BASOPHILS NFR BLD AUTO: 0 %
BILIRUB SERPL-MCNC: 0.4 MG/DL
BUN SERPL-MCNC: 41.1 MG/DL (ref 8–23)
CALCIUM SERPL-MCNC: 8.4 MG/DL (ref 8.8–10.4)
CARDIOPULMONARY BLOOD PRESSURE REST: NORMAL MMHG
CARDIOPULMONARY BREATHING RESERVE REST: 82.5
CARDIOPULMONARY BREATHING RESERVE V02MAX: 41
CARDIOPULMONARY CO2 OUTPUT REST: 202 ML/MIN
CARDIOPULMONARY CO2 OUTPUT VO2MAX: 853 ML/MIN
CARDIOPULMONARY FEV 1.0 (L) ACTUAL: 2.04
CARDIOPULMONARY FEV 1.0 (L) PRECENT: 78 %
CARDIOPULMONARY FEV 1.0 (L) PREDICTED: 2.61
CARDIOPULMONARY FEV 1.0 FVC (%) ACTUAL: 69.3
CARDIOPULMONARY FEV 1.0 FVC (%) PERCENT: 95 %
CARDIOPULMONARY FEV 1.0 FVC (%) PREDICTED: 72.8
CARDIOPULMONARY FUNCTIONAL CAPACITY MAX ML/KG/MIN: 11.6 ML/KG/MIN
CARDIOPULMONARY FUNCTIONAL CAPACITY PERCENT: 43 %
CARDIOPULMONARY FUNCTIONAL CAPACITY PREDICTED: 26.8 ML/KG/MIN
CARDIOPULMONARY FVC (L) ACTUAL: 2.94
CARDIOPULMONARY FVC (L) PERCENT: 81 %
CARDIOPULMONARY FVC (L) PREDICTED: 3.62
CARDIOPULMONARY HEART RATE REST: 97 BPM
CARDIOPULMONARY MET'S REST: 1.1
CARDIOPULMONARY MINUTE VENTILATION REST: 12.5 L/MIN
CARDIOPULMONARY MINUTE VENTILATION VO2MAX: 41.6 L/MIN
CARDIOPULMONARY MYOCARDIAC O2 DEMAND MAX: NORMAL
CARDIOPULMONARY OXYGEN CONSUMPTION REST: 3.8 ML/KG/MIN
CARDIOPULMONARY OXYGEN CONSUMPTION VO2MAX: 11.16 ML/KG/MIN
CARDIOPULMONARY OXYGEN PULSE REST: 2 ML/BEAT
CARDIOPULMONARY OXYGEN PULSE VO2MAX: 5.9 ML/BEAT
CARDIOPULMONARY OXYGEN SATURATION- OXIMETRY REST: 99 %
CARDIOPULMONARY OXYGEN SATURATION- OXIMETRY VO2MAX: 98 %
CARDIOPULMONARY PET C02 REST: 25
CARDIOPULMONARY PET C02 VO2MAX: 25
CARDIOPULMONARY PET02 REST: 116
CARDIOPULMONARY PET02 V02 MAX: 119
CARDIOPULMONARY RER: 1.16
CARDIOPULMONARY RESPIRALORY EXCHANGE RATIO VO2MAX: 1.16
CARDIOPULMONARY RESPIRALORY EXCHANGE RATIO: 0.85
CARDIOPULMONARY RESPIRATORY RATE REST: 24 BR/MIN
CARDIOPULMONARY RESPIRATORY RATE VO2MAX: 34 BR/MIN
CARDIOPULMONARY STRESS BASE 1 BP MMHG: NORMAL MMHG
CARDIOPULMONARY STRESS BASE 1 BPA: 120 BPM
CARDIOPULMONARY STRESS BASE 1 SPO2: 98 % SPO2
CARDIOPULMONARY STRESS BASE 1 TIME SEC: 0 SEC
CARDIOPULMONARY STRESS BASE 1 TIME: 1 MINS
CARDIOPULMONARY STRESS BASE 2 BP MMHG: NORMAL MMHG
CARDIOPULMONARY STRESS BASE 2 BPA: 115 BPM
CARDIOPULMONARY STRESS BASE 2 SPO2: 100 % SPO2
CARDIOPULMONARY STRESS BASE 2 TIME SEC: 0 SEC
CARDIOPULMONARY STRESS BASE 2 TIME: 3 MINS
CARDIOPULMONARY STRESS BASE 3 BP MMHG: NORMAL MMHG
CARDIOPULMONARY STRESS BASE 3 BPA: 100 BPM
CARDIOPULMONARY STRESS BASE 3 SPO2: 100 % SPO2
CARDIOPULMONARY STRESS BASE 3 TIME SEC: 0 SEC
CARDIOPULMONARY STRESS BASE 3 TIME: 5 MINS
CARDIOPULMONARY STRESS PHASE 1 BP MMHG: NORMAL MMHG
CARDIOPULMONARY STRESS PHASE 1 BPM: 111 BPM
CARDIOPULMONARY STRESS PHASE 1 SPO2: 94 % SPO2
CARDIOPULMONARY STRESS PHASE 1 TIME SEC: 0 SEC
CARDIOPULMONARY STRESS PHASE 1 TIME: 3 MINS
CARDIOPULMONARY STRESS PHASE 2 BPM: 122 BPM
CARDIOPULMONARY STRESS PHASE 2 SPO2: 98 % SPO2
CARDIOPULMONARY STRESS PHASE 2 TIME SEC: 10 SEC
CARDIOPULMONARY STRESS PHASE 2 TIME: 3 MINS
CARDIOPULMONARY SVC (L) ACTUAL: 2.9
CARDIOPULMONARY SVC (L) PERCENT: 80 %
CARDIOPULMONARY SVC (L) PREDICTED: 3.62
CARDIOPULMONARY TIDAL VOLUME REST: 522 ML
CARDIOPULMONARY TIDAL VOLUME VO2MAX: 1230 ML
CARDIOPULMONARY VE/VCO2 SLOPE: 48.99
CARDIOPULMONARY VENTILATORY EQUIVALENT 02 REST: 53
CARDIOPULMONARY VENTILATORY EQUIVALENT 02 V02: 49
CARDIOPULMONARY VENTILATORY EQUIVALENT C02 REST: 62
CARDIOPULMONARY VENTILATORY EQUIVALENT C02 SLOPE VO2MAX: 48.99
CARDIOPULMONARY VENTILATORY EQUIVALENT C02 VO2MAX: 49
CHLORIDE SERPL-SCNC: 103 MMOL/L (ref 98–107)
CHOLEST SERPL-MCNC: 85 MG/DL
CREAT SERPL-MCNC: 1.35 MG/DL (ref 0.67–1.17)
CV STRESS MAX HR HE: 122
CYSTATIN C (ROCHE): 2.3 MG/L (ref 0.6–1)
EGFRCR SERPLBLD CKD-EPI 2021: 55 ML/MIN/1.73M2
EOSINOPHIL # BLD AUTO: 0 10E3/UL (ref 0–0.7)
EOSINOPHIL NFR BLD AUTO: 0 %
ERYTHROCYTE [DISTWIDTH] IN BLOOD BY AUTOMATED COUNT: 15.9 % (ref 10–15)
FERRITIN SERPL-MCNC: 391 NG/ML (ref 31–409)
GFR/BSA.PRED SERPLBLD CYS-BASED-ARV: 24 ML/MIN/1.73M2
GLUCOSE SERPL-MCNC: 95 MG/DL (ref 70–99)
HCO3 SERPL-SCNC: 19 MMOL/L (ref 22–29)
HCT VFR BLD AUTO: 28.8 % (ref 40–53)
HDLC SERPL-MCNC: 29 MG/DL
HGB BLD-MCNC: 9.1 G/DL (ref 13.3–17.7)
IMM GRANULOCYTES # BLD: 0.1 10E3/UL
IMM GRANULOCYTES NFR BLD: 1 %
INR PPP: 1.31 (ref 0.85–1.15)
IRON BINDING CAPACITY (ROCHE): 157 UG/DL (ref 240–430)
IRON SATN MFR SERPL: 22 % (ref 15–46)
IRON SERPL-MCNC: 34 UG/DL (ref 61–157)
LDLC SERPL CALC-MCNC: 45 MG/DL
LYMPHOCYTES # BLD AUTO: 0.5 10E3/UL (ref 0.8–5.3)
LYMPHOCYTES NFR BLD AUTO: 4 %
MAGNESIUM SERPL-MCNC: 2.2 MG/DL (ref 1.7–2.3)
MCH RBC QN AUTO: 30.4 PG (ref 26.5–33)
MCHC RBC AUTO-ENTMCNC: 31.6 G/DL (ref 31.5–36.5)
MCV RBC AUTO: 96 FL (ref 78–100)
MONOCYTES # BLD AUTO: 0.2 10E3/UL (ref 0–1.3)
MONOCYTES NFR BLD AUTO: 2 %
NEUTROPHILS # BLD AUTO: 11.9 10E3/UL (ref 1.6–8.3)
NEUTROPHILS NFR BLD AUTO: 93 %
NONHDLC SERPL-MCNC: 56 MG/DL
NRBC # BLD AUTO: 0 10E3/UL
NRBC BLD AUTO-RTO: 0 /100
NT-PROBNP SERPL-MCNC: ABNORMAL PG/ML (ref 0–900)
PHOSPHATE SERPL-MCNC: 3.5 MG/DL (ref 2.5–4.5)
PLATELET # BLD AUTO: 329 10E3/UL (ref 150–450)
POTASSIUM SERPL-SCNC: 4.4 MMOL/L (ref 3.4–5.3)
PREALB SERPL-MCNC: 7.3 MG/DL (ref 20–40)
PREDICTED VO2MAX: 26.8
PROT SERPL-MCNC: 6.2 G/DL (ref 6.4–8.3)
RBC # BLD AUTO: 2.99 10E6/UL (ref 4.4–5.9)
SODIUM SERPL-SCNC: 134 MMOL/L (ref 135–145)
SPECIMEN EXPIRATION DATE: NORMAL
STRESS ANGINA INDEX: 0
STRESS ECHO BASELINE BP: NORMAL MMHG
STRESS ECHO BASELINE HR: 100 BPM
STRESS ECHO CALCULATED PERCENT HR: 84 %
STRESS ECHO LAST STRESS BP: NORMAL MMHG
STRESS ECHO POST ESTIMATED WORKLOAD: 3.3 METS
STRESS ECHO POST EXERCISE DUR MIN: 3 MIN
STRESS ECHO POST EXERCISE DUR SEC: 10 SEC
STRESS ECHO TARGET HR: 146
TRANSFERRIN SERPL-MCNC: 125 MG/DL (ref 200–360)
TRIGL SERPL-MCNC: 56 MG/DL
TSH SERPL DL<=0.005 MIU/L-ACNC: 2.45 UIU/ML (ref 0.3–4.2)
UFH PPP CHRO-ACNC: 0.22 IU/ML
UFH PPP CHRO-ACNC: 0.4 IU/ML
UFH PPP CHRO-ACNC: 0.42 IU/ML
WBC # BLD AUTO: 12.7 10E3/UL (ref 4–11)

## 2024-09-04 PROCEDURE — 76700 US EXAM ABDOM COMPLETE: CPT | Mod: 26 | Performed by: RADIOLOGY

## 2024-09-04 PROCEDURE — 82728 ASSAY OF FERRITIN: CPT

## 2024-09-04 PROCEDURE — 85390 FIBRINOLYSINS SCREEN I&R: CPT | Mod: 26 | Performed by: PATHOLOGY

## 2024-09-04 PROCEDURE — 250N000011 HC RX IP 250 OP 636

## 2024-09-04 PROCEDURE — 99233 SBSQ HOSP IP/OBS HIGH 50: CPT | Mod: 25 | Performed by: INTERNAL MEDICINE

## 2024-09-04 PROCEDURE — 80323 ALKALOIDS NOS: CPT

## 2024-09-04 PROCEDURE — 250N000013 HC RX MED GY IP 250 OP 250 PS 637

## 2024-09-04 PROCEDURE — 85520 HEPARIN ASSAY: CPT

## 2024-09-04 PROCEDURE — 36415 COLL VENOUS BLD VENIPUNCTURE: CPT

## 2024-09-04 PROCEDURE — 83880 ASSAY OF NATRIURETIC PEPTIDE: CPT

## 2024-09-04 PROCEDURE — 80321 ALCOHOLS BIOMARKERS 1OR 2: CPT

## 2024-09-04 PROCEDURE — 97530 THERAPEUTIC ACTIVITIES: CPT | Mod: GO

## 2024-09-04 PROCEDURE — 93922 UPR/L XTREMITY ART 2 LEVELS: CPT

## 2024-09-04 PROCEDURE — 99222 1ST HOSP IP/OBS MODERATE 55: CPT

## 2024-09-04 PROCEDURE — 93005 ELECTROCARDIOGRAM TRACING: CPT

## 2024-09-04 PROCEDURE — 99222 1ST HOSP IP/OBS MODERATE 55: CPT | Mod: 25

## 2024-09-04 PROCEDURE — 80061 LIPID PANEL: CPT

## 2024-09-04 PROCEDURE — 82610 CYSTATIN C: CPT

## 2024-09-04 PROCEDURE — 84443 ASSAY THYROID STIM HORMONE: CPT

## 2024-09-04 PROCEDURE — 84466 ASSAY OF TRANSFERRIN: CPT

## 2024-09-04 PROCEDURE — 86900 BLOOD TYPING SEROLOGIC ABO: CPT

## 2024-09-04 PROCEDURE — 93880 EXTRACRANIAL BILAT STUDY: CPT

## 2024-09-04 PROCEDURE — 99223 1ST HOSP IP/OBS HIGH 75: CPT | Mod: FS | Performed by: NURSE PRACTITIONER

## 2024-09-04 PROCEDURE — 85610 PROTHROMBIN TIME: CPT

## 2024-09-04 PROCEDURE — 85730 THROMBOPLASTIN TIME PARTIAL: CPT

## 2024-09-04 PROCEDURE — 97535 SELF CARE MNGMENT TRAINING: CPT | Mod: GO

## 2024-09-04 PROCEDURE — 84100 ASSAY OF PHOSPHORUS: CPT

## 2024-09-04 PROCEDURE — 85520 HEPARIN ASSAY: CPT | Performed by: NURSE PRACTITIONER

## 2024-09-04 PROCEDURE — 83735 ASSAY OF MAGNESIUM: CPT

## 2024-09-04 PROCEDURE — 93010 ELECTROCARDIOGRAM REPORT: CPT | Mod: XE | Performed by: INTERNAL MEDICINE

## 2024-09-04 PROCEDURE — 120N000005 HC R&B MS OVERFLOW UMMC

## 2024-09-04 PROCEDURE — 84134 ASSAY OF PREALBUMIN: CPT

## 2024-09-04 PROCEDURE — 85025 COMPLETE CBC W/AUTO DIFF WBC: CPT

## 2024-09-04 PROCEDURE — 93922 UPR/L XTREMITY ART 2 LEVELS: CPT | Mod: 26 | Performed by: RADIOLOGY

## 2024-09-04 PROCEDURE — 93880 EXTRACRANIAL BILAT STUDY: CPT | Mod: 26 | Performed by: RADIOLOGY

## 2024-09-04 PROCEDURE — 86901 BLOOD TYPING SEROLOGIC RH(D): CPT

## 2024-09-04 PROCEDURE — 80053 COMPREHEN METABOLIC PANEL: CPT

## 2024-09-04 PROCEDURE — 83550 IRON BINDING TEST: CPT

## 2024-09-04 PROCEDURE — 85613 RUSSELL VIPER VENOM DILUTED: CPT

## 2024-09-04 PROCEDURE — 76700 US EXAM ABDOM COMPLETE: CPT

## 2024-09-04 PROCEDURE — 36415 COLL VENOUS BLD VENIPUNCTURE: CPT | Performed by: NURSE PRACTITIONER

## 2024-09-04 RX ORDER — POTASSIUM CHLORIDE 750 MG/1
20 TABLET, EXTENDED RELEASE ORAL
Status: DISCONTINUED | OUTPATIENT
Start: 2024-09-05 | End: 2024-09-09

## 2024-09-04 RX ORDER — LIDOCAINE 40 MG/G
CREAM TOPICAL
Status: DISCONTINUED | OUTPATIENT
Start: 2024-09-05 | End: 2024-09-18 | Stop reason: HOSPADM

## 2024-09-04 RX ORDER — SODIUM CHLORIDE 9 MG/ML
INJECTION, SOLUTION INTRAVENOUS CONTINUOUS
Status: DISCONTINUED | OUTPATIENT
Start: 2024-09-04 | End: 2024-09-07

## 2024-09-04 RX ORDER — POTASSIUM CHLORIDE 750 MG/1
40 TABLET, EXTENDED RELEASE ORAL
Status: DISCONTINUED | OUTPATIENT
Start: 2024-09-05 | End: 2024-09-09

## 2024-09-04 RX ORDER — MAGNESIUM SULFATE HEPTAHYDRATE 40 MG/ML
2 INJECTION, SOLUTION INTRAVENOUS
Status: DISCONTINUED | OUTPATIENT
Start: 2024-09-05 | End: 2024-09-09

## 2024-09-04 RX ADMIN — Medication 6.25 MG: at 13:16

## 2024-09-04 RX ADMIN — Medication 6.25 MG: at 10:10

## 2024-09-04 RX ADMIN — ESCITALOPRAM OXALATE 10 MG: 10 TABLET ORAL at 10:09

## 2024-09-04 RX ADMIN — OXYCODONE HYDROCHLORIDE AND ACETAMINOPHEN 1000 MG: 500 TABLET ORAL at 10:10

## 2024-09-04 RX ADMIN — HEPARIN SODIUM 1200 UNITS/HR: 10000 INJECTION, SOLUTION INTRAVENOUS at 07:01

## 2024-09-04 RX ADMIN — MAGNESIUM OXIDE TAB 400 MG (241.3 MG ELEMENTAL MG) 400 MG: 400 (241.3 MG) TAB at 10:10

## 2024-09-04 RX ADMIN — Medication 6.25 MG: at 17:36

## 2024-09-04 RX ADMIN — DIGOXIN 250 MCG: 125 TABLET ORAL at 12:05

## 2024-09-04 RX ADMIN — EMPAGLIFLOZIN 10 MG: 10 TABLET, FILM COATED ORAL at 10:10

## 2024-09-04 RX ADMIN — POTASSIUM CHLORIDE 20 MEQ: 750 TABLET, EXTENDED RELEASE ORAL at 10:10

## 2024-09-04 RX ADMIN — FUROSEMIDE 40 MG: 10 INJECTION, SOLUTION INTRAVENOUS at 10:11

## 2024-09-04 RX ADMIN — AMIODARONE HYDROCHLORIDE 200 MG: 200 TABLET ORAL at 10:09

## 2024-09-04 RX ADMIN — ATORVASTATIN CALCIUM 80 MG: 80 TABLET, FILM COATED ORAL at 10:10

## 2024-09-04 RX ADMIN — ACETAMINOPHEN 975 MG: 325 TABLET ORAL at 02:30

## 2024-09-04 RX ADMIN — ACETAMINOPHEN 650 MG: 325 TABLET ORAL at 16:58

## 2024-09-04 RX ADMIN — Medication 5 MG: at 21:18

## 2024-09-04 RX ADMIN — DIGOXIN 125 MCG: 125 TABLET ORAL at 05:11

## 2024-09-04 RX ADMIN — CLOPIDOGREL BISULFATE 75 MG: 75 TABLET ORAL at 10:39

## 2024-09-04 RX ADMIN — Medication 1 TABLET: at 10:09

## 2024-09-04 RX ADMIN — Medication 1 TABLET: at 21:18

## 2024-09-04 ASSESSMENT — ACTIVITIES OF DAILY LIVING (ADL)
ADLS_ACUITY_SCORE: 24

## 2024-09-04 NOTE — CONSULTS
Urology Consult History and Physical    Name: Duane C Johnson    MRN: [id number]   YOB: 1950       We were asked to see Duane C Johnson at the request of Dr. Gonzalez  for evaluation and treatment of the following chief complaint:          Chief Complaint:     Evaluation for LVAD placement, history of prostate cancer  History is obtained from chart review and patient report          History of Present Illness:     Duane C Johnson is a 74 year old male with a past medical history of CAD, ischemic cardiomyopathy, systolic CHF, ICD placed 5/24, pneumonia, hypotension, prostate cancer, and who was admitted for worsening fatigue, BNP of 16k and left pleural effusion. He is getting evaluated for LVAD.    Urologic history  Patient had Sarah 4+3=7 prostate cancer diagnosed by Dr Causey in Sep 2023. PIRADS 4 on MRI. PSA 4.61. he initially elected prostatectomy, but he had MI on the day before surgery. Therefore, he was no longer a good candidate for prostatectomy. In April 2024,  patient had fiducial marker placed by Dr aBrros. Attemped to place [employer] at the same time but not successful due to patient's anatomy. On 6/11, he had his last Leuprolide. He completed radiation treatment on 6/27/24. His PSA was <0.01 on 7/29/24.    Today, he reports no urinary concerns. No freq/urgency, no gross hematuria, no straining, no flank pain or abdominal pain. Sense of complete bladder emptying. UOP is good. He has left inguinal hernia for at least 2-3 years.         Baseline cr ~1. Today's creatinine is 1.35, improved from 1.55 on 8/24/24.            Past Medical History:     Past Medical History:   Diagnosis Date    Benign essential hypertension     CAD (coronary artery disease)     Chronic systolic heart failure (H)     Hypertension     ST elevation myocardial infarction (STEMI), unspecified artery (H)     Ventricular fibrillation (H)             Past Surgical History:     Past Surgical History:   Procedure Laterality  Date    COLONOSCOPY N/A 3/23/2023    Procedure: COLONOSCOPY, FLEXIBLE, WITH LESION REMOVAL USING SNARE;  Surgeon: Jose Faustin MD;  Location: [facility]    CV CENTRAL VENOUS CATHETER PLACEMENT N/A 10/26/2023    Procedure: Central Venous Catheter Placement;  Surgeon: Rob Lyles MD;  Location:  [facility]    CV CORONARY ANGIOGRAM N/A 10/27/2023    Procedure: Coronary Angiogram;  Surgeon: Rob Lyles MD;  Location:  [facility]    CV CORONARY ANGIOGRAM N/A 10/26/2023    Procedure: Coronary Angiogram;  Surgeon: oRb Lyles MD;  Location:  [facility]    CV LEFT HEART CATH N/A 10/26/2023    Procedure: Left Heart Catheterization;  Surgeon: Rob Lyles MD;  Location:  [facility]    CV PCI N/A 10/27/2023    Procedure: Percutaneous Coronary Intervention;  Surgeon: Rob Lyles MD;  Location:  [facility]    CV PCI STENT DRUG ELUTING N/A 10/26/2023    Procedure: Percutaneous Coronary Intervention Stent;  Surgeon: Rob Lyles MD;  Location:  [facility]    CV RIGHT HEART CATH MEASUREMENTS RECORDED N/A 5/6/2024    Procedure: Heart Cath Right Heart Cath;  Surgeon: Rob Lyles MD;  Location:  [facility]    CV RIGHT HEART CATH MEASUREMENTS RECORDED N/A 9/3/2024    Procedure: Right Heart Catheterization;  Surgeon: Aaron Mesa MD;  Location: [facility] CATH [facility]    EP ICD INSERT SINGLE N/A 5/8/2024    Procedure: Implantable Cardioverter Defibrillator Device & Lead Implant Dual;  Surgeon: Guero Black MD;  Location: [facility] CARDIAC CATH LAB    INJECTION, HYDROGEL SPACER N/A 4/22/2024    Procedure: INJECTION, HYDROGEL SPACER AND FIDUCIAL MARKER PLACEMENT;  Surgeon: Stanislaw Barros MD;  Location: [facility]            Social History:     Social History     Tobacco Use    Smoking status: Former     Current packs/day: 0.00     Average  packs/day: 0.3 packs/day for 30.0 years (7.5 ttl pk-yrs)     Types: Cigarettes     Start date: 10/26/1993     Quit date: 10/26/2023     Years since quittin.8     Passive exposure: Past    Smokeless tobacco: Never    Tobacco comments:     Quit 10/26/2023   Substance Use Topics    Alcohol use: Yes     Comment: 1-2 beers per day            Family History:     Family History   Problem Relation Age of Onset    Other Cancer Mother     Obesity Mother     GI problems Father     Uterine Cancer Sister             Allergies:     Allergies   Allergen Reactions    Brilinta [Ticagrelor] Other (See Comments) and Difficulty breathing     Per pt and spouse, hyperventilation.    Clonazepam Other (See Comments)     Per spouse, acted like a zombie and he was shaky, could barely talk.            Medications:     Current Facility-Administered Medications   Medication Dose Route Frequency Provider Last Rate Last Admin    acetaminophen (TYLENOL) tablet 975 mg  975 mg Oral Q6H PRN Cristobal Fisher MD   975 mg at 24 0230    amiodarone (PACERONE) tablet 200 mg  200 mg Oral Daily Cristobal Fisher MD   200 mg at 24 1009    atorvastatin (LIPITOR) tablet 80 mg  80 mg Oral Daily Cristobal Fisher MD   80 mg at 24 1010    captopril (CAPOTEN) half-tab 6.25 mg  6.25 mg Oral TID AC Cristobal Fisher MD   6.25 mg at 24 1010    cetirizine (zyrTEC) tablet 10 mg  10 mg Oral Daily PRN Cristobal Fisher MD        clopidogrel (PLAVIX) tablet 75 mg  75 mg Oral Daily Cristobal Fisher MD   75 mg at 24 1039    digoxin (LANOXIN) tablet 250 mcg  250 mcg Oral Daily Cristobal Fisher MD   250 mcg at 24 1205    empagliflozin (JARDIANCE) tablet 10 mg  10 mg Oral Daily Cristobal Fisher MD   10 mg at 24 1010    escitalopram (LEXAPRO) tablet 10 mg  10 mg Oral Daily Cristobal Fisher MD   10 mg at 24 1009    heparin 25,000 units in 0.45% NaCl 250 mL ANTICOAGULANT infusion  0-5,000 Units/hr Intravenous Continuous Cristobal Fisher MD  13.5 mL/hr at 09/04/24 0808 1,350 Units/hr at 09/04/24 0808    HOLD nitroGLYcerin IF   Does not apply HOLD Cristobal Fisher MD        [Held by provider] lisinopril (ZESTRIL) tablet 2.5 mg  2.5 mg Oral Daily Cristobal Fisher MD        magnesium oxide (MAG-OX) tablet 400 mg  400 mg Oral Daily Cristobal Fisher MD   400 mg at 09/04/24 1010    melatonin tablet 5 mg  5 mg Oral At Bedtime PRN Cesia Park MD   5 mg at 09/03/24 2322    [Held by provider] metoprolol succinate ER (TOPROL-XL) 24 hr half-tab 12.5 mg  12.5 mg Oral Daily Cristobal Fisher MD        multivitamin w/minerals (THERA-VIT-M) tablet 1 tablet  1 tablet Oral BID Cristobal Fisher MD   1 tablet at 09/04/24 1009    potassium chloride tray ER (KLOR-CON M10) CR tablet 20 mEq  20 mEq Oral Daily Cristobal Fisher MD   20 mEq at 09/04/24 1010    vitamin C (ASCORBIC ACID) tablet 1,000 mg  1,000 mg Oral Daily Cristobal Fisher MD   1,000 mg at 09/04/24 1010             Review of Systems:    ROS: See HPI for pertinent details.  Remainder of 10-point ROS negative.   REVIEW OF SYSTEMS:  Skin: negative  Eyes: negative  Ears/Nose/Throat: negative  Respiratory: No shortness of breath, dyspnea on exertion, cough, or hemoptysis  Cardiovascular: negative  Gastrointestinal: negative  Genitourinary: as above  Musculoskeletal: negative  Neurologic: negative  Psychiatric: negative  Hematologic/Lymphatic/Immunologic: negative  Endocrine: negative           Physical Exam:   VS:  T: 99.7    HR: 70    BP: 94/58    RR: 16   GEN:  AOx3.  NAD.  Pleasant.  HEENT:  Sclerae anicteric.  Conjunctivae pink.  Moist mucous membranes  NECK:  Supple.  No lymphadenopathy.  CV:  RRR  LUNGS: Non-labored breathing.  BACK:  No costoverterbral tenderness.  ABD:  Soft.  NT.  ND.  No rebound or guarding.  No surgical scars. No masses.    :  Phallus uncircumcised.  Meatus patent. Normal penile shaft without plaques.  Testicles descended bilaterally, no nodules or tenderness.  Epididymes non-tender. Left  inguinal hernia  EXT:  Warm, well perfused.  no lower extremity edema bilaterally  SKIN:  Warm.  Dry.  No rashes.  NEURO:  CN grossly intact.                Data:   All laboratory data reviewed:    Lab Results   Component Value Date    PSA <0.01 07/29/2024    PSA 3.80 01/25/2024    PSA 4.61 03/13/2023     Recent Labs   Lab 09/04/24  0626 09/03/24  1458 09/03/24  0552 09/02/24  0525 08/31/24  0414   WBC 12.7*  --  12.7* 12.7* 10.6   HGB 9.1* 9.4* 9.0* 9.6* 9.8*     --  290 329 288       Recent Labs   Lab 09/04/24  0626 09/03/24  0552 09/03/24  0440 09/02/24  0525 09/01/24  0709   * 132*  --  134* 137   POTASSIUM 4.4 4.5 4.5 4.7 4.6   CHLORIDE 103 102  --  104 104   CO2 19* 21*  --  21* 24   BUN 41.1* 35.4*  --  36.3* 30.7*   CR 1.35* 1.19*  --  1.19* 1.21*   GLC 95 105*  --  108* 95   PRANAV 8.4* 8.1*  --  8.4* 8.4*   MAG 2.2  --  2.2 2.2 2.3   PHOS 3.5  --   --   --   --        Recent Labs   Lab 09/03/24  2325   COLOR Yellow   APPEARANCE Clear   URINEGLC >=1000*   URINEBILI Negative   URINEKETONE Negative   SG 1.024   URINEPH 5.5   PROTEIN 10*   NITRITE Negative   LEUKEST Negative   RBCU <1   WBCU 2     No results found for this or any previous visit.    All pertinent imaging reviewed:  CTCAP yesterday  Chest:   Device: Left chest implantable cardiac defibrillator with leads in the  right atrium and right ventricle.     Mediastinum: Mildly enlarged heart. No pericardial effusion.  Multivessel coronary artery calcific disease. No mediastinal  lymphadenopathy by short axis size criteria. Thoracic aorta  calcifications. Normal caliber of the ascending thoracic aorta and  main pulmonary artery. Normal esophagus. Normal thyroid.     Lungs: Central airways are patent. Small bilateral pleural effusions  and dependent atelectasis bilaterally. Scattered subcentimeter  pulmonary nodules, some which are not definitely seen on prior  imaging, for example a 3 mm nodule in the right lower lobe, which may  have been  obscured by pulmonary opacity on prior CT scan (series 6  image 211). No pneumothorax.     Abdomen and Pelvis:  Liver: No mass. No intrahepatic biliary ductal dilation.     Biliary System: Cholelithiasis without evidence of cholecystitis.     Pancreas: No mass or pancreatic ductal dilation.     Adrenal glands: No mass or nodules     Spleen: Normal.     Kidneys: No suspicious mass, obstructing calculus or hydronephrosis.     Gastrointestinal tract: Normal appendix. No abnormally dilated loops  of bowel. Colonic diverticulosis without evidence of acute  diverticulitis.     Mesentery/peritoneum/retroperitoneum: Bowel containing left inguinal  hernia without evidence of inflammation/ischemia.     Lymph nodes: No significant lymphadenopathy.     Vasculature: The abdominal aorta is nonaneurysmal. Atherosclerotic  calcification of the abdominal aorta and its branches. Patency cannot  be assessed on this noncontrast exam.     Pelvis: Urinary bladder is normal.  Prostate fiducials.     Osseous structures: No aggressive or acute osseous lesion.  Mild  multilevel degenerative changes of the spine.      Soft tissues: Within normal limits.                                                                      IMPRESSION:   1. Mild cardiomegaly and multivessel coronary calcifications.  2. Small bilateral pleural effusions.  3. Scattered pulmonary nodules, some of which are not definitely seen  on prior imaging as described above. In this patient with prostate  cancer, metastatic disease cannot be entirely excluded. Recommend  attention on follow-up.  4. Cholelithiasis.  5. Bowel-containing left inguinal hernia without evidence of  inflammation/ischemia.           Impression and Plan:   Impression / Plan:   Duane C Johnson is a 74 year old male with systolic CHF, history of prostate cancer s/p radiation therapy and ADT who is getting evaluated for LVAD.     - given that his PSA is <0.01, okay to proceed with LVAD  - recommends  testosterone lab. The lab results will not affect the evaluation for LVAD, but it will show the status of testosterone recovery after ADT.        Urology will sign off    Discussed with Dr. Downey    Thank you for the opportunity to participate in the care of Duane C Johnson.     Audrey Acosta  Department of Urology      Please call Job Code:   [phone] to reach the Urology resident or PA on call - Weekdays  [phone] to reach the Urology resident or PA on call - Weeknights and weekends

## 2024-09-04 NOTE — CONSULTS
Electrophysiology Consultation Note   EP Attending: Dr. Paredes.   Reason for consultation: cardioversion for afib.   Provider requesting consultation: Cards II  Date of Service: 9/4/2024      HPI:   Duane C Johnson is a 74 year old male with past medical history significant for anterior STEMI s/p PCI to pLAD 10/26/23, with VT/VF arrest 10/27/23, angiogram with patent stents, he was started on amiodarone. ICD not indicated given VT/VF within 48 hours of stent placement. LVEF prior to discharge 20-30%. He was discharged 11/3/23. Since discharge, he had couple hospital visit for HF exacerbations. Up titration of GDMT limited by hypotension, referred to EP for consideration of ICD implant. Had primary prevention ICD placed 5/8/24.   Patient more recently presented 8/30/24 with two weeks of worsening SOB, [place] [address], CKR with left pleural effusion. Echo done 8/31/2024 with LVEF 15-20%, apical wall akinesis, normal RV function, mild MR, mild-moderate TR, moderate biatrial enlargement. RHC done yesterday with mildly increased left sided pressures, normal right sided pressures and reduced CO. Primary team starting LVAD evaluation.   Per device interrogation patient with AF episode starting 8/28/24. No prior history of AF noted in chart. He has remained in afib with adequate control of rates. He denies any palpitations or heart racing. He has remained on amiodarone 200 mg daily since prior discharge for VT/VF. EP consulted this admission for consideration of cardioversion.   Past Medical History:   Past Medical History:   Diagnosis Date    Benign essential hypertension     CAD (coronary artery disease)     Chronic systolic heart failure (H)     Hypertension     ST elevation myocardial infarction (STEMI), unspecified artery (H)     Ventricular fibrillation (H)      Past Surgical History:   Past Surgical History:   Procedure Laterality Date    COLONOSCOPY N/A 3/23/2023    Procedure: COLONOSCOPY, FLEXIBLE, WITH  LESION REMOVAL USING SNARE;  Surgeon: Jose Faustin MD;  Location: [facility]    CV CENTRAL VENOUS CATHETER PLACEMENT N/A 10/26/2023    Procedure: Central Venous Catheter Placement;  Surgeon: Rob Lyles MD;  Location: [facility]    CV CORONARY ANGIOGRAM N/A 10/27/2023    Procedure: Coronary Angiogram;  Surgeon: Rob Lyles MD;  Location: [facility]    CV CORONARY ANGIOGRAM N/A 10/26/2023    Procedure: Coronary Angiogram;  Surgeon: Rob Lyles MD;  Location: [facility]    CV LEFT HEART CATH N/A 10/26/2023    Procedure: Left Heart Catheterization;  Surgeon: Rob Lyles MD;  Location: [facility]    CV PCI N/A 10/27/2023    Procedure: Percutaneous Coronary Intervention;  Surgeon: Rob Lyles MD;  Location: [facility]    CV PCI STENT DRUG ELUTING N/A 10/26/2023    Procedure: Percutaneous Coronary Intervention Stent;  Surgeon: Rob Lyles MD;  Location: [facility]    CV RIGHT HEART CATH MEASUREMENTS RECORDED N/A 5/6/2024    Procedure: Heart Cath Right Heart Cath;  Surgeon: Rob Lyles MD;  Location: [facility]    CV RIGHT HEART CATH MEASUREMENTS RECORDED N/A 9/3/2024    Procedure: Right Heart Catheterization;  Surgeon: Aaron Mesa MD;  Location: [facility]    EP ICD INSERT SINGLE N/A 5/8/2024    Procedure: Implantable Cardioverter Defibrillator Device & Lead Implant Dual;  Surgeon: Guero Black MD;  Location: [facility]    INJECTION, HYDROGEL SPACER N/A 4/22/2024    Procedure: INJECTION, HYDROGEL SPACER AND FIDUCIAL MARKER PLACEMENT;  Surgeon: Stanislaw Barros MD;  Location: [facility]     Allergies: Per MAR     Allergies   Allergen Reactions    Brilinta [Ticagrelor] Other (See Comments) and Difficulty breathing     Per pt and spouse, hyperventilation.    Clonazepam Other (See Comments)     Per  spouse, acted like a zombie and he was shaky, could barely talk.     Medications:   Per MAR current outpatient cardiovascular medications include:   Medications Prior to Admission   Medication Sig Dispense Refill Last Dose    amiodarone (PACERONE) 200 MG tablet Take 1 tablet (200 mg) by mouth daily 90 tablet 3 8/30/2024    atorvastatin (LIPITOR) 80 MG tablet Take 1 tablet (80 mg) by mouth daily 90 tablet 3 8/30/2024    cetirizine (ZYRTEC) 10 MG tablet Take 10 mg by mouth daily   8/30/2024    clopidogrel (PLAVIX) 75 MG tablet Take 1 tablet (75 mg) by mouth daily 30 tablet 3 8/30/2024    empagliflozin (JARDIANCE) 10 MG TABS tablet Take 1 tablet (10 mg) by mouth daily 90 tablet 3 8/29/2024    escitalopram (LEXAPRO) 10 MG tablet Take 10 mg by mouth daily   Unknown    fish oil-omega-3 fatty acids 1000 MG capsule Take 1 g by mouth 2 times daily Also contains another supplement   8/30/2024    ibuprofen (ADVIL/MOTRIN) 400 MG tablet Take 400 mg by mouth every 6 hours as needed for moderate pain.   8/30/2024    lisinopril (ZESTRIL) 2.5 MG tablet Take 1 tablet (2.5 mg) by mouth daily HOLD for systolic blood pressure < 90 90 tablet 1 8/30/2024    magnesium oxide (MAG-OX) 400 MG tablet Take 1 tablet (400 mg) by mouth daily 90 tablet 3 8/30/2024    melatonin 5 MG tablet Take 1-2 tablets (5-10 mg) by mouth at bedtime 60 tablet 0 8/29/2024    metoprolol succinate ER (TOPROL XL) 25 MG 24 hr tablet Take 0.5 tablets (12.5 mg) by mouth daily Hold for systolic blood pressure less than 90. 45 tablet 1 8/29/2024    multivitamin w/minerals (THERA-VIT-M) tablet Take 1 tablet by mouth 2 times daily   8/30/2024    potassium chloride tray ER (KLOR-CON M20) 20 MEQ CR tablet TAKE ONE TABLET BY MOUTH ONCE DAILY 30 tablet 2 8/29/2024    vitamin C (ASCORBIC ACID) 1000 MG TABS Take 1,000 mg by mouth daily   8/30/2024     No current outpatient medications on file.     Current Facility-Administered Medications   Medication Dose Route Frequency  Provider Last Rate Last Admin    acetaminophen (TYLENOL) tablet 975 mg  975 mg Oral Q6H PRN Cristobal Fisher MD   975 mg at 09/04/24 0230    amiodarone (PACERONE) tablet 200 mg  200 mg Oral Daily Cristobal Fisher MD   200 mg at 09/04/24 1009    atorvastatin (LIPITOR) tablet 80 mg  80 mg Oral Daily Cristobal Fisher MD   80 mg at 09/04/24 1010    captopril (CAPOTEN) half-tab 6.25 mg  6.25 mg Oral TID AC Cristobal Fisher MD   6.25 mg at 09/04/24 1010    cetirizine (zyrTEC) tablet 10 mg  10 mg Oral Daily PRN Cristobal Fisher MD        clopidogrel (PLAVIX) tablet 75 mg  75 mg Oral Daily Cristobal Fisher MD   75 mg at 09/04/24 1039    digoxin (LANOXIN) tablet 250 mcg  250 mcg Oral Daily Cristobal Fisher MD        empagliflozin (JARDIANCE) tablet 10 mg  10 mg Oral Daily Cristobal Fisher MD   10 mg at 09/04/24 1010    escitalopram (LEXAPRO) tablet 10 mg  10 mg Oral Daily Cristobal Fisher MD   10 mg at 09/04/24 1009    furosemide (LASIX) injection 40 mg  40 mg Intravenous Daily Danial Fofana MD   40 mg at 09/04/24 1011    heparin 25,000 units in 0.45% NaCl 250 mL ANTICOAGULANT infusion  0-5,000 Units/hr Intravenous Continuous Cristobal Fisher MD 13.5 mL/hr at 09/04/24 0808 1,350 Units/hr at 09/04/24 0808    HOLD nitroGLYcerin IF   Does not apply HOLD Cristobal Fisher MD        [Held by provider] lisinopril (ZESTRIL) tablet 2.5 mg  2.5 mg Oral Daily Cristobal Fisher MD        magnesium oxide (MAG-OX) tablet 400 mg  400 mg Oral Daily Cristobal Fisher MD   400 mg at 09/04/24 1010    melatonin tablet 5 mg  5 mg Oral At Bedtime PRN Cesia Park MD   5 mg at 09/03/24 1022    [Held by provider] metoprolol succinate ER (TOPROL-XL) 24 hr half-tab 12.5 mg  12.5 mg Oral Daily Cristobal Fisher MD        multivitamin w/minerals (THERA-VIT-M) tablet 1 tablet  1 tablet Oral BID Cristobal Fisher MD   1 tablet at 09/04/24 1009    potassium chloride tray ER (KLOR-CON M10) CR tablet 20 mEq  20 mEq Oral Daily Cristobal Fisher MD   20 mEq at 09/04/24 1010     "vitamin C (ASCORBIC ACID) tablet 1,000 mg  1,000 mg Oral Daily Cristobal Fisher MD   1,000 mg at 24 1010     Family History:   Family History   Problem Relation Age of Onset    Other Cancer Mother     Obesity Mother     GI problems Father     Uterine Cancer Sister      Social History:   Social History     Tobacco Use    Smoking status: Former     Current packs/day: 0.00     Average packs/day: 0.3 packs/day for 30.0 years (7.5 ttl pk-yrs)     Types: Cigarettes     Start date: 10/26/1993     Quit date: 10/26/2023     Years since quittin.8     Passive exposure: Past    Smokeless tobacco: Never    Tobacco comments:     Quit 10/26/2023   Substance Use Topics    Alcohol use: Yes     Comment: 1-2 beers per day       ROS:   A comprehensive 10 point ROS was negative other than as mentioned in HPI.    Physical Examination:   VITALS: /69 (BP Location: Right arm)   Pulse 79   Temp 98.1  F (36.7  C) (Oral)   Resp 21   Ht 1.676 m (5' 5.98\")   Wt 61.8 kg (136 lb 3.2 oz)   SpO2 96%   BMI 21.99 kg/m      GENERAL APPEARANCE: AxO, NAD   HEENT: NCAT, MMM.   CHEST: CTAB   CARDIOVASCULAR: S1S2, Regular rate, irregular rhythm, No m/r/g.   ABDOMEN: soft, nontender, nondistended   EXTREMITIES: No pedal edema. Distal pulses intact.   NEURO: Grossly nonfocal.   PSYCH: Normal affect.  SKIN: Warm and dry.   Data:   Labs:  BMP  Recent Labs   Lab 24  0626 24  0552 24  0440 24  0525 24  0709   * 132*  --  134* 137   POTASSIUM 4.4 4.5 4.5 4.7 4.6   CHLORIDE 103 102  --  104 104   PRANAV 8.4* 8.1*  --  8.4* 8.4*   CO2 19* 21*  --  * 24   BUN 41.1* 35.4*  --  36.3* 30.7*   CR 1.35* 1.19*  --  1.19* 1.21*   GLC 95 105*  --  108* 95     CBC  Recent Labs   Lab 24  0626 24  1458 24  0552 24  0525 24  0414   WBC 12.7*  --  12.7* 12.7* 10.6   RBC 2.99*  --  2.93* 3.09* 3.20*   HGB 9.1* 9.4* 9.0* 9.6* 9.8*   HCT 28.8*  --  28.0* 29.7* 30.2*   MCV 96  --  96 96 94   MCH " "30.4  --  30.7 31.1 30.6   MCHC 31.6  --  32.1 32.3 32.5   RDW 15.9*  --  15.7* 15.9* 15.9*     --  290 329 288     INR  Recent Labs   Lab 24  0626 24  0440 24  0525 24  0709   INR 1.31* 1.30* 1.26* 1.30*     No results found for: \"CKTOTAL\", \"CKMB\", \"TROPN\"  Cholesterol (mg/dL)   Date Value   2024 85   2024 83   2024 110   10/26/2023 96   2019 148   2018 126   2017 152   2016 150     HDL Cholesterol (mg/dL)   Date Value   2019 34 (L)   2018 33 (L)   2017 32 (L)   2016 32 (L)     Direct Measure HDL (mg/dL)   Date Value   2024 29 (L)   2024 25 (L)   2024 47   10/26/2023 30 (L)     LDL Cholesterol Calculated (mg/dL)   Date Value   2024 45   2024 46   2024 51   10/26/2023 60   2019 84   2018 64   2017 91   2016 68   [id number]     RHC: 9/3/24    Right sided filling pressures are normal.    Left sided filling pressures are mildly elevated.    Mild elevated pulmonary hypertension.    Reduced cardiac output level.  Post capillary pulmonary hypertension   Reduced cardiac output.   ECHO: 2024   Left ventricular function is decreased. The ejection fraction is 15-20% (severely reduced). Moderate left ventricular dilation is present. Apical wall akinesis is present. Severe diffuse hypokinesis is present. There is no thrombus seen in the left ventricle.  The right ventricle is normal size. Global right ventricular function is normal.  Mild functional mitral insufficiency is present.  Mild to moderate tricuspid functional insufficiency is present.  Pulmonary hypertension is present.The right ventricular systolic pressure is 40mmHg above the right atrial pressure.  IVC diameter and respiratory changes fall into an intermediate range suggesting an RA pressure of 8 mmHg.    Assessment:   Duane C Johnson is a 74 year old male with past medical history significant for " anterior STEMI s/p PCI to pLAD 10/26/23, with VT/VF arrest 10/27/23, angiogram with patent stents, he was started on amiodarone. ICD not indicated given VT/VF within 48 hours of stent placement. LVEF prior to discharge 20-30%. He was discharged 11/3/23. Since discharge, he had couple hospital visit for HF exacerbations. Up titration of GDMT limited by hypotension, referred to EP for consideration of ICD implant. Had primary prevention ICD placed 5/8/24. Patient more recently presented 8/30/24 with two weeks of worsening SOB, [place] [address], CKR with left pleural effusion. Echo done 8/31/2024 with LVEF 15-20%, apical wall akinesis, normal RV function, mild MR, mild-moderate TR, moderate biatrial enlargement. RHC done yesterday with mildly increased left sided pressures, normal right sided pressures and reduced CO. Primary team starting LVAD evaluation.   Per device interrogation patient with AF episode starting 8/28/24. No prior history of AF noted in chart. He has remained in afib with adequate control of rates. He denies any palpitations or heart racing. He has remained on amiodarone 200 mg daily since prior discharge for VT/VF. EP consulted this admission for consideration of cardioversion.   EP Recommendations:  Atrial Fibrillation, new, persistent   1. Stroke Prophylaxis: CHADSVASC 3 +age, +HF, +CAD 3, corresponding to a 3.2% annual stroke / systemic embolism event rate. Continue heparin gtt, with transition to DOAC vs warfarin prior to discharge.   2. Rate Control: Continue metoprolol succinate.   3. Rhythm Control: Cardioversion, Antiarrhythmics and/or ablation are options for rhythm control. Patient has remained on amiodarone 200 mg daily since admission in Oct/Nov 2023. Now with persistent atrial fibrillation noted on device, recommend cardioversion attempt given this his initial known occurrence. It appears eliquis was stopped 7/5, and heparin was started on admission. Due to interruption, he would  need MELBA prior to cardioversion.     ICM LVEF 15-20%   1. ACEi/ARB/ARNi: Continue lisinopril.  2. BB: continue metoprolol  3. Aldosterone antagonist: limited d/t hypotension.  4. SGLT2i: continue jardiance  5. Statin: continue lipitor   6.  SCD prophylaxis: s/p ICD implant 5/8/24. Device with normal device function, stable lead trends.   7. Fluid status: euvolemic, lasix stopped management per primary team.       The patient states understanding and is agreeable with plan.   Thank you for allowing us to participate in the care of this patient.     The patient was discussed w/ Dr. Paredes.  The above note reflects our joint plan.    Hawa Keita PA-C  [facility]  Electrophysiology Consult Service  Pager: [phone]    EP STAFF NOTE  I have seen and examined the patient as part of a shared visit with .  I agree with the note above. I reviewed today's vital signs and medications. I have reviewed and discussed with the advanced practice provider their physical examination, assessment, and plan   Briefly, persistent AF,   My key history/exam findings are: AF.   The key management decisions made by me: DCCV.  Total time spent on patient visit, reviewing notes, imaging, labs, orders, and completing necessary documentation: 60 minutes.  >50% of visit spent on counseling patient and/or coordination of care.     Romaine Paredes MD [facility]  Cardiology - Electrophysiology

## 2024-09-04 NOTE — PROGRESS NOTES
CLINICAL NUTRITION SERVICES - ASSESSMENT NOTE      RECOMMENDATIONS FOR MDs/PROVIDERS TO ORDER:  None at this time    Malnutrition Status:    Unable to determine due to pt not in room during multiple attempts    Recommendations already ordered by Registered Dietitian (RD):  -Oral supplements  -Room service appropriate with assist    Future/Additional Recommendations:  -Diet order/advancement as per provider. Monitor need for sodium and fluid restrictions. Encourage pt to self-select tolerated foods/beverages (altered dentition). Rec small, frequent meals and use of oral supplements. Pt with increased protein needs.    -Continue multivitamin with minerals to help meet micronutrient needs.  -Consider iron supplementation if warranted.   -If pt needs TFs if/when post-op Tx, initiate TFs, Osmolite1.5 TF + Prosource TF 20, 2 pkts daily (concentrated, maintenance TF, or equivalent). Initiate TFs at 15 mL/hr, advancing rate by 10 mL Q 8 hrs (or per provider discretion, pending hemodynamic stability) to goal rate of 45 mL/hr. Osmolite 1.5 Vance (or equivalent) @ goal of 45ml/hr  (1080ml/day) + 2 pkts Prosource TF 20 provides: 1780 kcals, 107 g PRO, 822 ml free H20, 219 g CHO, and 0 g fiber daily.                            If begin tube feeds:                         - Flush FT with 30 mL water Q 4 hrs for patency. Rec provider adjust free water flushes as needed, pending fluid status.                        - Ensure K+/Mg++/Phos labs are ordered daily until TFs advance to goal infusion to evaluate for sx of refeeding with nutrition received. If lytes trend low, aggressively replace lytes.                            - If not already ordered, order a multivitamin/mineral (certavite 15 mL/day via FT) to help ensure micronutrient needs being met with suspected hypermetabolic demands and potential interruptions to TF infusions.                        - Monitor TF and possible need to adjust nutrition support regimen if necessary,  "pending medical course and nutrition status.        REASON FOR ASSESSMENT  Duane C Johnson is a/an 74 year old male assessed by the dietitian for Provider order for Heart Failure pre Ventricular Assist Device (VAD) planning, Frailty assessment. RN consult for poor appetite and limited food choices. patient currently on NPO for C     Malnutrition screening tool/admission nutrition risk screen on admit:     Have you recently lost weight without trying?: no   Have you been eating poorly because of a decreased appetite?: no    NUTRITION/ADDITIONAL HISTORY  Per RD note 11/8/2023, \" Pt reports he cooks and creates new foods. He received heart healthy education at [facility] 11/123 but, says he has not had time to read. He says his wife will not have any questions. He says the food here is very bland. Pt asked if there is a twin to salt that would have no affect on the body (biologic molecular twin). Writer is not aware of anything like this on the market. Provided instruction on 2 gm Sodium diet, we reviewed label reading for sodium content, pt encouraged to avoid the salt shaker and eat fresh foods as able, otherwise consider sodium content of processed foods purchased and choose lower sodium. We discussed rationale for low sodium when eating well and for fluid restriction. Provided  the following handouts: Low Sodium Nutrition Therapy. Pt offered magic cup as he says he is not eating well. He would like to try. Writer will order with dinner.\"    Per H & P, \"Pmhx of anterior STEMI s/p PCI to the pLAD on 10/26/23 with a VT/VF arrest the next day (10/27) with patent stents and unchanged anatomy on repeat angiogram. Placed on amiodarone and discharged 11/03/23, ICD was not indicated as this was within 48h of his MI. His EF prior do discharge was 20-30% with a large area of akinesis in the LAD placed on apixaban due to this. Has had multiple admissions for HF exacerbation last year.  He presents for 2 weeks of worsening fatigue; " "found to have BNP of 16k and L pleural effusion.Has had multiple admissions for HF exacerbation last year. 2 ED visits in the last 2 weeks for fatigue.\" Pt was ordered to take, noting, jardiance, fish oil, mag-ox, thera-vit-M, potassium, and vitamin C PTA.     Unable to obtain nutrition history. Pt not in room during multiple attempts to see.     CURRENT NUTRITION ORDERS  Diet: NPO for tests/procedures. Previously on a 2 g sodium diet and more recently on a level 7 easy to chew diet.   Intake/Tolerance: Tolerating diet. Per nursing flowsheets (% intake), pt consuming % so far this admission. However, NPO at times for tests/procedures    LABS  Labs reviewed    MEDICATIONS  Medications reviewed    ANTHROPOMETRICS  Height: 167.6 cm (5' 5.984\")  Most Recent Weight: 61.8 kg (136 lb 3.2 oz)    IBW: 64.5 kg. Pt currently at 96% of IBW  BMI: Normal BMI, but at the lower end of rec range due to 75 yo male.  Weight History: 84.7 kg (4/22/19), 61.2 kg (9/12/2023), 63.1 kg (3/6/2024), 62.6 kg (6/5/2024), 64.3 kg (8/8/2024), 66 kg (8/30/2024), 61.8 kg (9/4) - No significant/severe wt loss. Per provider, hypervolemic on admit. Wt likely affected by fluid status (diuresis this admission)   Dosing Weight: 62 kg (based on lowest wt so far this admission of 61.8 kg on 9/4)     ASSESSED NUTRITION NEEDS (for inpatient hospital stay)  Estimated Energy Needs: 3739-6711+ kcals/day (25 - 30+ kcals/kg)  Justification: Maintenance  Estimated Protein Needs: 68-87 grams protein/day (1.1 - 1.4 grams of pro/kg)  Justification: Increased needs with cardiac status. Estimated needs increase to 1.5-2 g protein/kg if/when LVAD is placed  Estimated Fluid Needs: 5388-9657 mL/day (25 - 30 mL/kg)   Justification: Maintenance and Per provider pending fluid status    PHYSICAL FINDINGS/OTHER FINDINGS  See malnutrition section below.  Regarding frailty, see malnutrition section below.   Resp: No O2  GI: Having zero to one stool/s daily on average. Last " "Bowel Movement: 09/01/24.   HEENT: Per CT dental 9/3, \" Extensive dental caries and periapical lucencies involving essentially all dentition. Chronic maxillary sinusitis.\"  WOC: Not following at this time     MALNUTRITION  % Intake: Decreased intake does not meet criteria  % Weight Loss: Weight loss does not meet criteria  Subcutaneous Fat Loss: Unable to assess  Muscle Loss: Unable to assess  Fluid Accumulation/Edema: None noted  Malnutrition Diagnosis: Unable to determine due to pt not in room during multiple attempts    NUTRITION DIAGNOSIS  Predicted inadequate nutrient intake (protein-energy) related to NPO for tests/procedures and per RN consult, poor appetite.     INTERVENTIONS  Implementation  Collaboration with other providers: Discussed pt with RN.  Medical food supplement therapy: Gave oral supplement menu. Ordered Ensure Enlive at 14:00.   Modify composition of meals/snacks: Gave saturated fat/sodium and easy to chew menus. Ordered room service appropriate with assist.  Nutrition education for nutrition relationship to health/disease: Unable to fully assess LVAD candidacy from a nutrition standpoint but nutrition education to be provided if/when able pending plan. Provided the following handouts: How to Read Nutrition Labels, Low-Sodium Foods and Drinks, Tips for a Low-Sodium Diet, Seasoning Your Foods Without Adding Salt, and Managing Fluid Restriction.      Goals  Patient to consume % of nutritionally adequate meal trays TID, or the equivalent with supplements/snacks.     Monitoring/Evaluation  Progress toward goals will be monitored and evaluated per protocol.       Opal Esposito, MS, RD, LD, CNSC      No longer available via pager  6C (beds 6950-6433 and [phone]): Vocera \"6C Clinical Dietitian\"   Weekend/Holiday: Vocera \"Weekend Clinical Dietitian\"   "

## 2024-09-04 NOTE — PLAN OF CARE
"I: Monitored vitals and assessed pt status.   Changes during this shift: sharp chest pain reported 1/10 reported by patient after ambulating with therapy in [facility], updated MD, EKG done: no acute changes, tylenol given for his back pain.    Running: Heparin 1350 units/hr, Anti-Xa recheck 2040     Vitals: BP 98/61 (BP Location: Right arm)   Pulse 89   Temp 98.7  F (37.1  C) (Oral)   Resp 20   Ht 1.676 m (5' 5.98\")   Wt 61.8 kg (136 lb 3.2 oz)   SpO2 97%   BMI 21.99 kg/m      A:   Neuro: Ax4 denies headache, dizziness, lightheadedness, numbness and tingling. calls appropriately   Cardiac: Afib 80-90s, SBP 80-90s, afebrile, VSS.  Respiratory: sating >95 on room air. denies SOB. LS diminished on bases.  Diet/appetite: Easy to chew, level 7, poor appetite. Patient only ate one meal today.  GI/:  No BM this shift. Denies abdominal pain. Adequate urine output.   Activity: independent - SBA  Skin: WNL, Warm, dry, normal color  Line:  PIV L arm running heparin drip  Goal Outcome Evaluation:      Plan of Care Reviewed With: patient, spouse    Overall Patient Progress: no changeOverall Patient Progress: no change    Outcome Evaluation: Continued evaluation for LVAD, denies chest pain and SOB      "

## 2024-09-04 NOTE — PROGRESS NOTES
Ultrasound requested patient made NPO @ 00:00 for anticipated abdominal ultrasound in the morning.  If ultrasound can't get to him in the morning, okay for patient to eat and then will be made NPO again @ 12:00 noon with anticipation of the abdominal ultrasound in the afternoon on 9/4.  Okay for patient to travel off monitor per order but should still be done with portable because of his heparin drip.  Wife requesting to be notified if the ultrasound will happen in the morning or afternoon.  It's their 48th wedding anniversary and she wants to bring him a Frosty from [facility].

## 2024-09-04 NOTE — CONSULTS
"Dental Hygiene Consult Service        Duane C Johnson MRN# [id number]   YOB: 1950 Age: 74 year old     Date of Admission: 8/30/2024  PCP is Jose Coles  Date of Service: 9/4/2024           Reason for Consult:   Referring MD & Reason for Visit: I was asked by Luther Sloan MD, to see Duane C Johnson for a COMPREHENSIVE oral assessment regarding Heart failure pre Ventricular Assist Device (VAD) planning, dental clearance for cardiac surgery     *Please note: dental hygiene services, including oral assessment, are not a replacement for examination by a licensed dentist. The patient has been informed of the oral assessment procedures and has provided verbal consent.       History of Present Illness:   This patient is a 74 year old male with a history of anterior STEMI s/p PCI to the pLAD on 10/26/23 with a VT/VF arrest the next day (10/27) with patent stents and unchanged anatomy on repeat angiogram. Placed on amiodarone and discharged 11/03/23, ICD was not indicated as this was within 48h of his MI. His EF prior to discharge was 20-30% with a large area of akinesis in the LAD, placed on apixaban due to this (Apixaban dcd on 7/5/24). Has had multiple admissions for HF exacerbation last year.  Admitted on 8/30/24. He presents for 2 weeks of worsening fatigue; found to have BNP of 16k and L pleural effusion. Admitted for diuresis and RHC.  Dental and oropharyngeal history is pertinent for ongoing VAD workup, requiring dental clearance - dental CT completed 9/3; pt does not have an established dental home - pt unable to recall when last dental visit was; social hx significant for previous tobacco smoking, alcohol use.  The patient reports no current oral/dental pain or concerns. Pt endorses occasional intraoral swelling, scattered t/o the dentition - when this occurs pt states, \"I take a sharp X-acto knife and take care of it myself.\" Pt expresses interest in having remaining dentition extracted and " "eventually replace them with dental implants.    EXAM: CT DENTAL WO CONTRAST 9/3/2024 6:06 PM     HISTORY:  Heart failure pre Ventricular Assist Device (VAD) planning,  dental clearance for cardiac surgery.     TECHNIQUE: 3-D reconstruction by the technologists, with curved  multiplanar reformat of thin section imaging through the mandible and  maxilla obtained without intravenous contrast.     FINDINGS:  No significant soft tissue swelling or mass. Normal facial bone  alignment. Extensive dental caries and periapical lucencies involving  essentially all dentition. Multiple missing and broken teeth.     Chronic circumferential maxillary osseous wall thickening bilaterally.  Chronic opacification of the left maxillary sinus including erosion of  the medial wall. Normal temporomandibular joints.                                                                      IMPRESSION:   1. Extensive dental caries and periapical lucencies involving  essentially all dentition.  2. Chronic maxillary sinusitis.     General Observations   Level of support Patient is fully independent   Patient currently in pain No   Patient wears dentures No   Current oral care routine Pt states \"that rarely happens\"   Patient has oral care products Patient does not have oral care products   Extraoral assessment   General appearance Symmetrical, Collapsed, and TMJ dysfuncction - severe popping of bilateral TMJ, pt states this does not cause pain or lock up   Lymph nodes Symmetrical, no abnormalities, non-tender   Oral Health Assessment Tool (OHAT)   Lips 1=Changes: (ie. dry, chapped, or red at corners )- dry, chapped   Tongue 1=Changes: (ie. patchy, fissured, red, coated ) - slight white coating on dorsal surface (consistent with dental biofilm)   Gums and Tissues 1=Changes: (ie. dry, shiny, rough, red, swollen, one ulcer/sore spot (under dentures)) - dry, mild gum inflammation localized to mandibular posterior remaining dentition/root tips   Saliva " 1=Changes: (ie. dry, sticky tissues, little saliva present, resident thinks they have dry mouth )- dry, sticky tissues (pt currently NPO); pt denies dry mouth   Natural Teeth Yes/No 2=Unhealthy: (ie. 4+ decayed or broken teeth/roots, or very worn down teeth, or less than 4 teeth) - remaining dentition with suspected severe caries, retained root tips    Dentures Yes/No Patient does not wear dentures   Oral Cleanliness 2=Unhealthy: ( ie. food particles/tartar/plaque in most areas of the mouth or on most of dentures or severe halitosis (bad breath)) - moderate dental biofilm covering remaining dentition   Dental Pain 0=Healthy: no behavioural, verbal, or physical signs of dental pain   OHAT Total Score (0-16) 8   Other Dental Concerns Suspicious for periapical lesions - see dental CT results, Suspicious for periodontitis, Patient does not have dental home, and Infrequent professional dental care   Care provided during assessment Oral Assessment, Connect with other provider, Oral care, and Patient education   Follow up DH assessments required? No   Connect with [facility] or [place] care? Yes: [facility] dental team will review   For Dental Team Service Requesting Consult:  [facility] 2 - HF  Clearance/Reason:  VAD planning  Dental CT status:  completed 9/3  Upcoming Sx date(s), if known:  TBD  Expected discharge date, if known:  TBD pending workup  Ability to transfer to [facility]:  yes  Pain reported, by pt:  none  Swelling present:  none  Current dental Rx regimen:  none   Oral Care Plan To optimize oral health during hospitalization and reduce risk of HAIs:  Brushing oral hard and soft tissues with soft-bristled toothbrush and fluoridated toothpaste at least BID (ideally up to QID)  Swish-and-spit with alcohol-free antiseptic rinse BID to reduce oral bacterial load  Continue to monitor dry mouth - start with frequent hydration using water and sucking on ice chips. If symptoms increase, consider Biotene (pt currently denies dry  mouth)  Frequent application of Vaseline to lips to prevent dryness/chapping    Following dental treatment, pt may need to follow a soft food/liquid diet.     will connect pt with hospital dental team; however, emphasized that follow-up care will need to occur at an established dental home. The hospital dental team does not fabricate dentures or place dental implants.             Assessment and Plan:   Assessment:  This patient is a 74 year old male with a history of anterior STEMI s/p PCI to the pLAD on 10/26/23 with a VT/VF arrest the next day (10/27) with patent stents and unchanged anatomy on repeat angiogram. Placed on amiodarone and discharged 11/03/23, ICD was not indicated as this was within 48h of his MI. His EF prior to discharge was 20-30% with a large area of akinesis in the LAD, placed on apixaban due to this (Apixaban dcd on 7/5/24). Has had multiple admissions for HF exacerbation last year.  Admitted on 8/30/24. He presents for 2 weeks of worsening fatigue; found to have BNP of 16k and L pleural effusion. Admitted for diuresis and RHC, oral exam concerning for poor dentition - remaining dentition with suspected severe caries, retained root tips; CT confirms generalized PARLs, dental caries - pt denies current pain, no swelling or drainage present; poor oral hygiene, suspected chronic periodontitis, dry mouth (which pt denies).      Plan:   [facility] will review DH note and dental CT results to provide further recommendations for patient's dental needs and dental clearance status.   Implement oral care plan as described above. Pt is currently independent in oral cares.    TARA Reardon  Message on [employer]  Pager: [phone]      Past Medical History   I have reviewed this patient's medical history and updated it with pertinent information if needed.  Past Medical History:   Diagnosis Date    Benign essential hypertension     CAD (coronary artery  disease)     Chronic systolic heart failure (H)     Hypertension     ST elevation myocardial infarction (STEMI), unspecified artery (H)     Ventricular fibrillation (H)        Past Surgical History   I have reviewed this patient's surgical history and updated it with pertinent information if needed.  Past Surgical History:   Procedure Laterality Date    COLONOSCOPY N/A 3/23/2023    Procedure: COLONOSCOPY, FLEXIBLE, WITH LESION REMOVAL USING SNARE;  Surgeon: Jose Faustin MD;  Location: [facility]    CV CENTRAL VENOUS CATHETER PLACEMENT N/A 10/26/2023    Procedure: Central Venous Catheter Placement;  Surgeon: Rob Lyles MD;  Location: [facility] CARDIAC CATH LAB    CV CORONARY ANGIOGRAM N/A 10/27/2023    Procedure: Coronary Angiogram;  Surgeon: Rob Lyles MD;  Location:  HEART CARDIAC CATH LAB    CV CORONARY ANGIOGRAM N/A 10/26/2023    Procedure: Coronary Angiogram;  Surgeon: Rob Lyles MD;  Location:  HEART CARDIAC CATH LAB    CV LEFT HEART CATH N/A 10/26/2023    Procedure: Left Heart Catheterization;  Surgeon: Rob Lyles MD;  Location: [facility] CARDIAC CATH LAB    CV PCI N/A 10/27/2023    Procedure: Percutaneous Coronary Intervention;  Surgeon: Rob Lyles MD;  Location:  HEART CARDIAC CATH LAB    CV PCI STENT DRUG ELUTING N/A 10/26/2023    Procedure: Percutaneous Coronary Intervention Stent;  Surgeon: Rob Lyles MD;  Location:  HEART CARDIAC CATH LAB    CV RIGHT HEART CATH MEASUREMENTS RECORDED N/A 5/6/2024    Procedure: Heart Cath Right Heart Cath;  Surgeon: Rob Lyles MD;  Location: [facility] CARDIAC CATH LAB    EP ICD INSERT SINGLE N/A 5/8/2024    Procedure: Implantable Cardioverter Defibrillator Device & Lead Implant Dual;  Surgeon: Guero Black MD;  Location: [facility] CARDIAC CATH LAB    INJECTION, HYDROGEL SPACER N/A 4/22/2024    Procedure: INJECTION, HYDROGEL SPACER AND FIDUCIAL MARKER PLACEMENT;  Surgeon:  Stanislaw Barros MD;  Location:  OR       Social History   I have reviewed this patient's social history and updated it with pertinent information if needed.  Social History     Tobacco Use    Smoking status: Former     Current packs/day: 0.00     Average packs/day: 0.3 packs/day for 30.0 years (7.5 ttl pk-yrs)     Types: Cigarettes     Start date: 10/26/1993     Quit date: 10/26/2023     Years since quittin.8     Passive exposure: Past    Smokeless tobacco: Never    Tobacco comments:     Quit 10/26/2023   Vaping Use    Vaping status: Never Used   Substance Use Topics    Alcohol use: Yes     Comment: 1-2 beers per day    Drug use: No     Prior to Admission Medications   Prior to Admission Medications   Prescriptions Last Dose Informant Patient Reported? Taking?   amiodarone (PACERONE) 200 MG tablet 2024 Spouse/Significant Other No Yes   Sig: Take 1 tablet (200 mg) by mouth daily   atorvastatin (LIPITOR) 80 MG tablet 2024 Spouse/Significant Other No Yes   Sig: Take 1 tablet (80 mg) by mouth daily   cetirizine (ZYRTEC) 10 MG tablet 2024 Spouse/Significant Other Yes Yes   Sig: Take 10 mg by mouth daily   clopidogrel (PLAVIX) 75 MG tablet 2024  No Yes   Sig: Take 1 tablet (75 mg) by mouth daily   empagliflozin (JARDIANCE) 10 MG TABS tablet 2024  No Yes   Sig: Take 1 tablet (10 mg) by mouth daily   escitalopram (LEXAPRO) 10 MG tablet Unknown  Yes Yes   Sig: Take 10 mg by mouth daily   fish oil-omega-3 fatty acids 1000 MG capsule 2024 Spouse/Significant Other Yes Yes   Sig: Take 1 g by mouth 2 times daily Also contains another supplement   ibuprofen (ADVIL/MOTRIN) 400 MG tablet 2024  Yes Yes   Sig: Take 400 mg by mouth every 6 hours as needed for moderate pain.   lisinopril (ZESTRIL) 2.5 MG tablet 2024  No Yes   Sig: Take 1 tablet (2.5 mg) by mouth daily HOLD for systolic blood pressure < 90   magnesium oxide (MAG-OX) 400 MG tablet 2024  No Yes   Sig: Take 1 tablet  (400 mg) by mouth daily   melatonin 5 MG tablet 8/29/2024  No Yes   Sig: Take 1-2 tablets (5-10 mg) by mouth at bedtime   metoprolol succinate ER (TOPROL XL) 25 MG 24 hr tablet 8/29/2024  No Yes   Sig: Take 0.5 tablets (12.5 mg) by mouth daily Hold for systolic blood pressure less than 90.   multivitamin w/minerals (THERA-VIT-M) tablet 8/30/2024 Spouse/Significant Other Yes Yes   Sig: Take 1 tablet by mouth 2 times daily   potassium chloride tray ER (KLOR-CON M20) 20 MEQ CR tablet 8/29/2024  No Yes   Sig: TAKE ONE TABLET BY MOUTH ONCE DAILY   vitamin C (ASCORBIC ACID) 1000 MG TABS 8/30/2024 Spouse/Significant Other Yes Yes   Sig: Take 1,000 mg by mouth daily      Facility-Administered Medications: None     Allergies   Allergies   Allergen Reactions    Brilinta [Ticagrelor] Other (See Comments) and Difficulty breathing     Per pt and spouse, hyperventilation.    Clonazepam Other (See Comments)     Per spouse, acted like a zombie and he was shaky, could barely talk.     Physical Exam

## 2024-09-04 NOTE — PROGRESS NOTES
[facility]  Cardiology II Service / Advanced Heart Failure  Daily Progress Note    Patient: Duane C Johnson      : 1950      MRN: [id number]    Assessment/Plan:   Duane C Johnson is a 74 year old male pmhx of anterior STEMI s/p PCI to the pLAD on 10/26/23 with a VT/VF arrest the next day (10/27) with patent stents and unchanged anatomy on repeat angiogram. Placed on amiodarone and discharged 23, ICD was not indicated as this was within 48h of his MI. His EF prior to discharge was 20-30% with a large area of akinesis in the LAD, placed on apixaban due to this (Apixaban dcd on 24). Has had multiple admissions for HF exacerbation last year.  Admitted on 24. He presents for 2 weeks of worsening fatigue; found to have BNP of 16k and L pleural effusion. Admitted for diuresis and RHC.     Today's changes:   - Continue heparin gtt. Hold per EP   - discontinue lasix   - Continuing Captopril at 6.25 TID. Tolerating this dose without dizziness   - Continue Digoxin 250mcg   - Continue evaluation for LVAD   - EP consulted for possible TTE and cardioversion tomorrow    # Acute heart failure exacerbation  #Possible ambulatory shock  # HFrEf 2/2 ICM (EF 15-20% by TTE 24)  Fluid status: Euvolemic  ACEi/ARB/ARNi/afterload reduction: lisinopril 2.5 mg daily, hold for SBP < 90  BB: metoprolol succinate 12.5 mg HS, hold for SBP < 90, HR < 50  Aldosterone antagonist: unable to start d/t hypotension   SGLT2i: Empagliflozin 10mg  SCD prophylaxis: s/p ICD 2024  NSAID use: contraindicated  Antiarrhythmic: Amiodarone 200mg   Diuretic: Was on Bumex 1mg daily; this was changed to PRN on 24 when Empagliflozin was started    Echo from  with EF 15-20% and trace MR. See below for full report.    RHC from 9/3 showing normal R sided pressures, mildly increased L sided pressures, reduced CO. See below for full report.  BP as low as 84/62 (MAP 69), has remained  asymptomatic other than fatigue and mild SOB.    - BNP of 16k on admission with L sided pleural effusion   - Continuing to hold Metoprolol, Jardiance was restarted   - Lasix Dcd as pt appears euvolemic and RHC showed normal right sided pressure   - Low intensity heparin gtt     - Continue Captopril 6.25 TID    - Continue Digoxin 250mcg    #AFib  # h/o VT/VF arrest in 2023  # ICD placed on 5/8/24  Was on apixaban for low EF (akinesis of the mid-distal anterior,mid anteroseptum, distal septum and the apex) and questionable history of Afib. Since there was no episode of Afib captured apixaban was stopped on 7/5/24. Device interrogation showing AF episode started on 8/28/24.  - PTA Amio 200, Magnesium 400, Potassium 20  - Holding PTA Metoprolol   - Mag and Potassium replacement per nursing protocol  - EP consulted for MELBA cardioversion    #LVAD evaluation   Pt has receiving financial clearance and LVAD evaluation is proceeding.  - Dental evaluation shows carries in all remaining teeth. Will need extraction  - Urology consulted for history of prostate cancer. Has completed therapy and last PSA was undetectable. This is not an obstacle to LVAD  - Abd US without evidence of chronic liver disease.   - No significant carotid stenosis   - ABIs normal    # CAD s/p PCI to LAD  # STEMI  Anterior STEMI on 10/26/23 with a VT/VF arrest the next day. Repeat angio showed patent stents and unchanged anatomy.   - PTA plavix, Atorvastatin 80    #DOMENICA  Baseline Cr normal at ~1. Cr of 1.55 on 8/24 with this acute episode of HF. Concern for type 1 cardiorenal syndrome. uPCR negative. Improving.    - Diuresis as above  - avoid nephrotoxins     ================================  Chronic Problems:    #Prostate Cancer  Diagnosed in March 2023. Underwent Leuprolide on 3/19/24 and 6/11/24. Completed radiation treatment on 6/27/24     #L inguinal hernia  Pt reports this has been present for some time. Not causing any pain. Not a surgical candidate  with current HF status so will not consult surgery at this time.     Hospital Checklist:  DVT Prophylaxis: Heparin gtt  Code Status: Full Code  Bowel regimen: PRN  Diet/Nutrition: 2L fluid and 2G sodium restriction. NPO at MN  Lines: Left PIV    Disposition Planning:  3-5 days for LVAD evaluation    Staffed w/ Dr. Sloan.    Cristobal Fisher MD  Internal Medicine PGY-1  Cardiology II Service     09/04/2024  ==================================    Subjective:   NAEO. Having some back pain from poor sleep position. Still feels tired from the CPX yesterday. States that he normally experiences palpitations but that has stopped since starting the digoxin yesterday. Has not been moving around much but is not having significant SOB at rest. Denies any chest pain, dizziness, headaches.     Objective:     Vitals:    09/03/24 0600 09/03/24 1356 09/04/24 0511   Weight: 62.1 kg (136 lb 12.8 oz) 62.1 kg (136 lb 14.5 oz) 61.8 kg (136 lb 3.2 oz)     Vital Signs with Ranges  Temp:  [97.7  F (36.5  C)-98.8  F (37.1  C)] 98.3  F (36.8  C)  Pulse:  [] 79  Resp:  [17-21] 18  BP: ()/(61-80) 97/67  SpO2:  [85 %-100 %] 100 %  I/O last 3 completed shifts:  In: 867.75 [P.O.:480; I.V.:387.75]  Out: 1250 [Urine:1250]    Exam:  General: No acute distress,   HEENT: MMM  Neck: No apparent JVD visualized  CV: Irregular rhythm, regular rate, no murmurs,   Lungs: On RA, Crackles improved. No wheezes, no increased WOB  Abd: Soft, non-tender, non-distended  Ext: Warm and well-perfused, no lower extremity edema  Neuro: Awake, alert, conversational, moving all extremities spontaneously throughout examination    Medications   Current Facility-Administered Medications   Medication Dose Route Frequency Provider Last Rate Last Admin    heparin 25,000 units in 0.45% NaCl 250 mL ANTICOAGULANT infusion  0-5,000 Units/hr Intravenous Continuous Cristobal Fisher MD 12 mL/hr at 09/04/24 0701 1,200 Units/hr at 09/04/24 0701     Current  Facility-Administered Medications   Medication Dose Route Frequency Provider Last Rate Last Admin    amiodarone (PACERONE) tablet 200 mg  200 mg Oral Daily Cristobal Fisher MD   200 mg at 09/03/24 0951    atorvastatin (LIPITOR) tablet 80 mg  80 mg Oral Daily Cristobal Fihser MD   80 mg at 09/03/24 0900    captopril (CAPOTEN) half-tab 6.25 mg  6.25 mg Oral TID AC Cristobal Fisher MD   6.25 mg at 09/03/24 2107    clopidogrel (PLAVIX) tablet 75 mg  75 mg Oral Daily Cristobal Fisher MD   75 mg at 09/03/24 0951    digoxin (LANOXIN) tablet 250 mcg  250 mcg Oral Daily Cristobal Fisher MD        empagliflozin (JARDIANCE) tablet 10 mg  10 mg Oral Daily Cristobal Fisher MD   10 mg at 09/03/24 0950    escitalopram (LEXAPRO) tablet 10 mg  10 mg Oral Daily Cristobal Fisher MD   10 mg at 09/03/24 0951    furosemide (LASIX) injection 40 mg  40 mg Intravenous Daily Danial Fofana MD   40 mg at 09/03/24 0859    [Held by provider] lisinopril (ZESTRIL) tablet 2.5 mg  2.5 mg Oral Daily Cristobal Fisher MD        magnesium oxide (MAG-OX) tablet 400 mg  400 mg Oral Daily Cristobal Fisher MD   400 mg at 09/03/24 0900    [Held by provider] metoprolol succinate ER (TOPROL-XL) 24 hr half-tab 12.5 mg  12.5 mg Oral Daily Cristobal Fisher MD        multivitamin w/minerals (THERA-VIT-M) tablet 1 tablet  1 tablet Oral BID Cristobal Fisher MD   1 tablet at 09/03/24 2029    potassium chloride tray ER (KLOR-CON M10) CR tablet 20 mEq  20 mEq Oral Daily Cristobal Fisher MD   20 mEq at 09/03/24 0900    vitamin C (ASCORBIC ACID) tablet 1,000 mg  1,000 mg Oral Daily Cristobal Fisher MD   1,000 mg at 09/03/24 0900       Data   Recent Labs   Lab 09/04/24  0626 09/03/24  1458 09/03/24  0552 09/03/24  0440 09/02/24  0525 09/01/24  0709   WBC 12.7*  --  12.7*  --  12.7*  --    HGB 9.1* 9.4* 9.0*  --  9.6*  --    MCV 96  --  96  --  96  --      --  290  --  329  --    INR  --   --   --  1.30* 1.26* 1.30*   NA  --   --  132*  --  134* 137   POTASSIUM  --   --  4.5  4.5 4.7 4.6   CHLORIDE  --   --  102  --  104 104   CO2  --   --  21*  --  21* 24   BUN  --   --  35.4*  --  36.3* 30.7*   CR  --   --  1.19*  --  1.19* 1.21*   ANIONGAP  --   --  9  --  9 9   PRANAV  --   --  8.1*  --  8.4* 8.4*   GLC  --   --  105*  --  108* 95   ALBUMIN  --   --  2.9*  --  2.8* 3.0*   PROTTOTAL  --   --  6.1*  --  6.3* 6.5   BILITOTAL  --   --  0.3  --  0.4 0.4   ALKPHOS  --   --  98  --  110 101   ALT  --   --  30  --  29 31   AST  --   --  44  --  63* 88*     RHC 9/3/24    RA:   RV:39/  PA:  PCWP:16/22/15    Pa Sat:48.2%  Goldy; CO/CI: 2.91/1.71  Thermodilution; CO/CI:3.53/2.08   Right sided filling pressures are normal.   Left sided filling pressures are mildly elevated. Mild elevated pulmonary hypertension.   Reduced cardiac output level.         Echocardiogram Complete   Result Value    LVEF  15-20% (severely reduced)    [facility]    [id number]  [id number]  [id number]  [id number]^ROXANA^SONIA     [facility],[place]  Echocardiography Laboratory  [address] [place] [id number]     Name: JOHNSON, DUANE C  MRN: [id number]  : 1950  Study Date: 2024 08:20 AM  Age: 74 yrs  Gender: Male  Patient Location: [facility]  Reason For Study: CHF  Ordering Physician: SONIA SCHMIDT  Performed By: Yessenia Cano RDCS     BSA: 1.7 m2  Height: 66 in  Weight: 145 lb  HR: 71  BP: 94/73 mmHg  ______________________________________________________________________________  Procedure  Complete Portable Echo Adult. Contrast Optison. Optison (NDC #2921-2409-78)  given intravenously. Patient was given 6 ml mixture of 3 ml Optison and 6 ml  saline. 3 ml wasted.  ______________________________________________________________________________  Interpretation Summary  Left ventricular function is decreased. The ejection fraction is 15-20%  (severely reduced).  Moderate left ventricular dilation is present.  Apical wall akinesis is present.  Severe diffuse hypokinesis is  present.  There is no thrombus seen in the left ventricle.  The right ventricle is normal size.  Global right ventricular function is normal.  Mild functional mitral insufficiency is present.  Mild to moderate tricuspid functional insufficiency is present.  Pulmonary hypertension is present.The right ventricular systolic pressure is  40mmHg above the right atrial pressure.  IVC diameter and respiratory changes fall into an intermediate range  suggesting an RA pressure of 8 mmHg.     This study was compared with the study from 2/19/2024 .MR, TR improved. LVEF  similar in both studies compared side to side. So overall no change in LVEF  ______________________________________________________________________________  Left Ventricle  Left ventricular wall thickness is normal. Moderate left ventricular dilation  is present. Left ventricular diastolic function is not assessable. Left  ventricular function is decreased. The ejection fraction is 15-20% (severely  reduced). Apical wall akinesis is present. Severe diffuse hypokinesis is  present. There is no thrombus seen in the left ventricle.     Right Ventricle  The right ventricle is normal size. Global right ventricular function is  normal. A pacemaker lead is noted in the right ventricle.     Atria  Moderate left atrial enlargement is present. Moderate right atrial enlargement  is present.     Mitral Valve  Mild mitral insufficiency is present.     Aortic Valve  Trileaflet aortic sclerosis without stenosis. On Doppler interrogation, there  is no significant stenosis or regurgitation.     Tricuspid Valve  Mild to moderate tricuspid insufficiency is present. The right ventricular  systolic pressure is approximated at 39.7 mmHg plus the right atrial pressure.  Pulmonary hypertension is present. The right ventricular systolic pressure is  40mmHg above the right atrial pressure.     Pulmonic Valve  On Doppler interrogation, there is no significant stenosis or regurgitation.      Vessels  The aorta root is normal. IVC diameter and respiratory changes fall into an  intermediate range suggesting an RA pressure of 8 mmHg.     Pericardium  No pericardial effusion is present.     Compared to Previous Study  This study was compared with the study from 2024 . MR, TR improved. LVEF  similar in both studies compared side to side. So overall no change in LVEF.  ______________________________________________________________________________  MMode/2D Measurements & Calculations     IVSd: 0.89 cm  LVIDd: 6.0 cm  LVIDs: 5.5 cm  LVPWd: 0.79 cm  FS: 7.1 %  LV mass(C)d: 197.0 grams  LV mass(C)dI: 112.9 grams/m2  LVOT diam: 2.0 cm  LVOT area: 3.2 cm2  EF Biplane: 16.8 %  LA Volume (BP): 71.3 ml  LA Volume Index (BP): 41.0 ml/m2  RV Base: 4.6 cm  RWT: 0.27     TAPSE: 1.0 cm     Doppler Measurements & Calculations  MV E max ivan: 82.7 cm/sec  LV V1 max P.9 mmHg  LV V1 max: 84.2 cm/sec  LV V1 VTI: 13.8 cm  MR PISA: 4.2 cm2  MR ERO: 0.33 cm2  MR volume: 35.7 ml  SV(LVOT): 43.8 ml  SI(LVOT): 25.1 ml/m2  PA acc time: 0.08 sec  TR max ivan: 315.1 cm/sec  TR max P.7 mmHg  RV S Ivan: 10.7 cm/sec     ______________________________________________________________________________  Report approved by: Jeanine MEJIA 2024 11:05 AM            Examination: XR CHEST 2 VIEWS 2024 3:06 PM     Indication: Shortness of breath     Comparison: Radiograph 2024. CT 2023.     Findings:  PA and lateral views of the chest. Left chest wall implantable cardiac  defibrillator with grossly intact leads/shock coil. Coronary stenting.  Trachea is midline. Stable mild cardiomegaly. Mild blunting of the  left costophrenic angle, likely representing small pleural effusion.  No pneumothorax. No focal consolidation or any definite abnormal  airspace opacities. No acute or suspicious osseous abnormality.  Unremarkable visualized abdomen.      Impression   Impression:   1. Stable mild cardiomegaly with  implantable cardiac defibrillator and  coronary stenting.  2. Small left pleural effusion.     I have personally reviewed the examination and initial interpretation  and I agree with the findings.     PANCHITO EID MD         SYSTEM ID:  [id number]      12/6/2023 CMR  Clinical history: 73 year old with anterior STEMI, cardiac arrest in Oct 2023  Comparison CMR: none  1. The left ventricle is severely enlarged. There is wall thinning and akinesis of the mid-distal anterior,  mid anteroseptum, distal septum and the apex  The global systolic function is severely reduced. The LVEF is 28%.  2. The right ventricle is normal in cavity size. The global systolic function is normal. The RVEF is 52%.   3. The right atrium is normal and the left atrium is severely enlarged.  4. There is mild mitral regurgitation.   5. There is transmural late gadolinium enhancement in mid-distal anterior, mid anteroseptum, distal  septum and the apex consistent with a large infarction in the LAD territory.   6. There is no pericardial effusion.  7. There is no intracardiac thrombus.  8. There are bilateral pleural effusions.  CONCLUSIONS:   Ischemic cardiomyopathy with a large anteroapical infarction.   Severely reduced left ventricular function and normal right ventricular function, LVEF 28% and RVEF 52%.     Cardiac Catheterization:  10/26/23    1st Mrg lesion is 20% stenosed.    Prox LAD to Mid LAD lesion is 100% stenosed.    1st Diag-1 lesion is 80% stenosed.    1st Diag-2 lesion is 50% stenosed.    Dist LAD lesion is 100% stenosed.    RPDA lesion is 70% stenosed.    Dist RCA lesion is 40% stenosed.     Anterior STEMI  Two vessel obstructive CAD (100% pLAD occlusion, 70% rPDA stenosis, 80% diagonal 1 stenosis)  PCI of pLAD to mLAD with BRENDA x 1 (Synergy 2.50x 28 mm) post dilated to 2.75 vessel  CVC placement in RIJ  LVEDP of 26 mmHg  Hemostasis of RRA with TR band      2/19/2024 Echo  Interpretation Summary     1. The left ventricle is  moderately dilated. Left ventricular hypertrophy is  noted by two-dimensional echocardiography. Proximal septal thickening is  noted. Left ventricular systolic function is moderate to severely reduced. The  visual ejection fraction is 25-30%. Biplane LVEF is 25%. Diastolic Doppler  findings (E/E' ratio and/or other parameters) suggest left ventricular filling  pressures are increased. There is severe hypokinesis to akinesis of the mid  anterior/anterolateral/anteroseptal/inferoseptal walls and all apical segments  of the left ventricle. There is no thrombus seen in the left ventricle.  2. The right ventricle is normal size. The right ventricular systolic function  is normal.  3. The left atrium is moderately dilated. Right atrial size is normal. There  is no color Doppler evidence of an atrial shunt.  4. There is moderate to mod-severe (2-3+) mitral regurgitation.  5. There is mild to moderate (1-2+) tricuspid regurgitation.Right ventricular  systolic pressure is elevated, consistent with moderate pulmonary  hypertension.  6. No pericardial effusion.  7. In direct comparison to the previous study dated 10/30/2023, there has been  an interval increase in the degree of mitral regurgitation. The other findings  are similar.     RHC 5/6/24  RA: 10/9/6 mmHg  RV: 61/11 mmHg  PA: 63/24/38 mmHg  PCWP: Unable to obtain accurate measurement  CO/CI (Goldy): 3.89/2.3 L/min/m2    PA sat: 64%  MAP: 85 mmHg       Right sided filling pressures are normal.    Mild elevated pulmonary hypertension.    Normal cardiac output level.        Device interrogated on 8/30/24  Device: [employer] D533 RESONATE HF ICD  Normal device function.  Mode: DDDR  bpm  AP: 33%  : 3%  Intrinsic rhythm: Afib w/ VS  bpm  Thoracic Impedance: Below reference line, suggests possible intrathoracic fluid accumulation.  Lead Trends Appear Stable.  Estimated battery longevity to RRT = 12.5 years.    Atrial Arrhythmia: AF episode began on  8/28/24  AF Wood: 11%  Anticoagulant: Eliquis  Ventricular Arrhythmia: None  Setting Changes: Rate response turned ON in device, pacing mode changed to DDDR from DDD, fallback rate during AT/AF episodes 70 bpm.

## 2024-09-05 ENCOUNTER — APPOINTMENT (OUTPATIENT)
Dept: OCCUPATIONAL THERAPY | Facility: CLINIC | Age: 74
DRG: 001 | End: 2024-09-05
Attending: STUDENT IN AN ORGANIZED HEALTH CARE EDUCATION/TRAINING PROGRAM
Payer: MEDICARE

## 2024-09-05 ENCOUNTER — APPOINTMENT (OUTPATIENT)
Dept: CARDIOLOGY | Facility: CLINIC | Age: 74
DRG: 001 | End: 2024-09-05
Attending: STUDENT IN AN ORGANIZED HEALTH CARE EDUCATION/TRAINING PROGRAM
Payer: MEDICARE

## 2024-09-05 ENCOUNTER — ANESTHESIA (OUTPATIENT)
Dept: SURGERY | Facility: CLINIC | Age: 74
DRG: 001 | End: 2024-09-05
Payer: MEDICARE

## 2024-09-05 ENCOUNTER — ANESTHESIA EVENT (OUTPATIENT)
Dept: SURGERY | Facility: CLINIC | Age: 74
DRG: 001 | End: 2024-09-05
Payer: MEDICARE

## 2024-09-05 LAB
ALBUMIN SERPL BCG-MCNC: 2.7 G/DL (ref 3.5–5.2)
ALP SERPL-CCNC: 132 U/L (ref 40–150)
ALT SERPL W P-5'-P-CCNC: 22 U/L (ref 0–70)
ANION GAP SERPL CALCULATED.3IONS-SCNC: 8 MMOL/L (ref 7–15)
AST SERPL W P-5'-P-CCNC: 27 U/L (ref 0–45)
BASOPHILS # BLD AUTO: 0 10E3/UL (ref 0–0.2)
BASOPHILS NFR BLD AUTO: 0 %
BILIRUB SERPL-MCNC: 0.3 MG/DL
BUN SERPL-MCNC: 42.1 MG/DL (ref 8–23)
CALCIUM SERPL-MCNC: 8.2 MG/DL (ref 8.8–10.4)
CHLORIDE SERPL-SCNC: 101 MMOL/L (ref 98–107)
CREAT SERPL-MCNC: 1.25 MG/DL (ref 0.67–1.17)
DIGOXIN SERPL-MCNC: 1.7 NG/ML (ref 0.6–2)
DRVVT CONFIRM NORMALIZED RATIO: 1.1
DRVVT SCREEN MIX RATIO: 0.99
DRVVT SCREEN RATIO: 1.13
EGFRCR SERPLBLD CKD-EPI 2021: 60 ML/MIN/1.73M2
EOSINOPHIL # BLD AUTO: 0 10E3/UL (ref 0–0.7)
EOSINOPHIL NFR BLD AUTO: 0 %
ERYTHROCYTE [DISTWIDTH] IN BLOOD BY AUTOMATED COUNT: 15.5 % (ref 10–15)
GLUCOSE SERPL-MCNC: 111 MG/DL (ref 70–99)
HCO3 SERPL-SCNC: 24 MMOL/L (ref 22–29)
HCT VFR BLD AUTO: 29.6 % (ref 40–53)
HEPZYMED PTT RATIO: 1.11
HEPZYMED PTT-LA: 40 SECONDS (ref 31–45)
HEPZYMED THROMBIN TIME: 21.1 SECONDS (ref 15.7–21.7)
HGB BLD-MCNC: 9.6 G/DL (ref 13.3–17.7)
IMM GRANULOCYTES # BLD: 0.1 10E3/UL
IMM GRANULOCYTES NFR BLD: 1 %
INR PPP: 1.35 (ref 0.85–1.15)
LA PPP-IMP: NEGATIVE
LOCATION OF TASK: ABNORMAL
LUPUS INTERPRETATION: ABNORMAL
LVEF ECHO: NORMAL
LYMPHOCYTES # BLD AUTO: 0.5 10E3/UL (ref 0.8–5.3)
LYMPHOCYTES NFR BLD AUTO: 3 %
MAGNESIUM SERPL-MCNC: 2.1 MG/DL (ref 1.7–2.3)
MAGNESIUM SERPL-MCNC: 2.1 MG/DL (ref 1.7–2.3)
MCH RBC QN AUTO: 31 PG (ref 26.5–33)
MCHC RBC AUTO-ENTMCNC: 32.4 G/DL (ref 31.5–36.5)
MCV RBC AUTO: 96 FL (ref 78–100)
MONOCYTES # BLD AUTO: 0.2 10E3/UL (ref 0–1.3)
MONOCYTES NFR BLD AUTO: 2 %
NEUTROPHILS # BLD AUTO: 12.6 10E3/UL (ref 1.6–8.3)
NEUTROPHILS NFR BLD AUTO: 94 %
NRBC # BLD AUTO: 0 10E3/UL
NRBC BLD AUTO-RTO: 0 /100
PATIENT PTT-LA: 72 SECONDS (ref 31–45)
PLATELET # BLD AUTO: 318 10E3/UL (ref 150–450)
POTASSIUM SERPL-SCNC: 4.4 MMOL/L (ref 3.4–5.3)
POTASSIUM SERPL-SCNC: 4.5 MMOL/L (ref 3.4–5.3)
PROT SERPL-MCNC: 6 G/DL (ref 6.4–8.3)
RBC # BLD AUTO: 3.1 10E6/UL (ref 4.4–5.9)
SODIUM SERPL-SCNC: 133 MMOL/L (ref 135–145)
THROMBIN TIME: 49.1 SECONDS (ref 13–19)
UFH PPP CHRO-ACNC: 0.43 IU/ML
WBC # BLD AUTO: 13.4 10E3/UL (ref 4–11)

## 2024-09-05 PROCEDURE — 250N000011 HC RX IP 250 OP 636

## 2024-09-05 PROCEDURE — 370N000017 HC ANESTHESIA TECHNICAL FEE, PER MIN

## 2024-09-05 PROCEDURE — 80162 ASSAY OF DIGOXIN TOTAL: CPT

## 2024-09-05 PROCEDURE — 93005 ELECTROCARDIOGRAM TRACING: CPT

## 2024-09-05 PROCEDURE — 84132 ASSAY OF SERUM POTASSIUM: CPT

## 2024-09-05 PROCEDURE — 97129 THER IVNTJ 1ST 15 MIN: CPT | Mod: GO

## 2024-09-05 PROCEDURE — 258N000003 HC RX IP 258 OP 636: Performed by: INTERNAL MEDICINE

## 2024-09-05 PROCEDURE — 85025 COMPLETE CBC W/AUTO DIFF WBC: CPT

## 2024-09-05 PROCEDURE — 83735 ASSAY OF MAGNESIUM: CPT | Performed by: STUDENT IN AN ORGANIZED HEALTH CARE EDUCATION/TRAINING PROGRAM

## 2024-09-05 PROCEDURE — 250N000013 HC RX MED GY IP 250 OP 250 PS 637

## 2024-09-05 PROCEDURE — 36415 COLL VENOUS BLD VENIPUNCTURE: CPT

## 2024-09-05 PROCEDURE — 85610 PROTHROMBIN TIME: CPT | Performed by: STUDENT IN AN ORGANIZED HEALTH CARE EDUCATION/TRAINING PROGRAM

## 2024-09-05 PROCEDURE — 93325 DOPPLER ECHO COLOR FLOW MAPG: CPT

## 2024-09-05 PROCEDURE — 93320 DOPPLER ECHO COMPLETE: CPT | Mod: 26 | Performed by: INTERNAL MEDICINE

## 2024-09-05 PROCEDURE — 92960 CARDIOVERSION ELECTRIC EXT: CPT | Performed by: NURSE ANESTHETIST, CERTIFIED REGISTERED

## 2024-09-05 PROCEDURE — 120N000005 HC R&B MS OVERFLOW UMMC

## 2024-09-05 PROCEDURE — 93325 DOPPLER ECHO COLOR FLOW MAPG: CPT | Mod: 26 | Performed by: INTERNAL MEDICINE

## 2024-09-05 PROCEDURE — 93312 ECHO TRANSESOPHAGEAL: CPT | Mod: 26 | Performed by: INTERNAL MEDICINE

## 2024-09-05 PROCEDURE — 85520 HEPARIN ASSAY: CPT | Performed by: INTERNAL MEDICINE

## 2024-09-05 PROCEDURE — 82040 ASSAY OF SERUM ALBUMIN: CPT

## 2024-09-05 PROCEDURE — 83735 ASSAY OF MAGNESIUM: CPT

## 2024-09-05 PROCEDURE — 92960 CARDIOVERSION ELECTRIC EXT: CPT | Performed by: NURSE PRACTITIONER

## 2024-09-05 PROCEDURE — 99233 SBSQ HOSP IP/OBS HIGH 50: CPT | Mod: 25 | Performed by: INTERNAL MEDICINE

## 2024-09-05 PROCEDURE — 92960 CARDIOVERSION ELECTRIC EXT: CPT

## 2024-09-05 PROCEDURE — 250N000009 HC RX 250: Performed by: INTERNAL MEDICINE

## 2024-09-05 PROCEDURE — 99152 MOD SED SAME PHYS/QHP 5/>YRS: CPT | Performed by: INTERNAL MEDICINE

## 2024-09-05 PROCEDURE — 99100 ANES PT EXTEME AGE<1 YR&>70: CPT | Performed by: NURSE ANESTHETIST, CERTIFIED REGISTERED

## 2024-09-05 PROCEDURE — 250N000009 HC RX 250: Performed by: NURSE ANESTHETIST, CERTIFIED REGISTERED

## 2024-09-05 PROCEDURE — 250N000011 HC RX IP 250 OP 636: Performed by: INTERNAL MEDICINE

## 2024-09-05 PROCEDURE — 999N000147 HC STATISTIC PT IP EVAL DEFER

## 2024-09-05 PROCEDURE — 93010 ELECTROCARDIOGRAM REPORT: CPT | Mod: XU | Performed by: INTERNAL MEDICINE

## 2024-09-05 RX ORDER — POTASSIUM CHLORIDE 750 MG/1
20 TABLET, EXTENDED RELEASE ORAL
Status: ACTIVE | OUTPATIENT
Start: 2024-09-05 | End: 2024-09-05

## 2024-09-05 RX ORDER — SODIUM CHLORIDE 9 MG/ML
1000 INJECTION, SOLUTION INTRAVENOUS CONTINUOUS
Status: DISCONTINUED | OUTPATIENT
Start: 2024-09-05 | End: 2024-09-07

## 2024-09-05 RX ORDER — FUROSEMIDE 40 MG
40 TABLET ORAL DAILY
Status: DISCONTINUED | OUTPATIENT
Start: 2024-09-05 | End: 2024-09-11

## 2024-09-05 RX ORDER — LIDOCAINE HYDROCHLORIDE 20 MG/ML
15 SOLUTION OROPHARYNGEAL ONCE
Status: COMPLETED | OUTPATIENT
Start: 2024-09-05 | End: 2024-09-05

## 2024-09-05 RX ORDER — FENTANYL CITRATE 50 UG/ML
25 INJECTION, SOLUTION INTRAMUSCULAR; INTRAVENOUS
Status: DISCONTINUED | OUTPATIENT
Start: 2024-09-05 | End: 2024-09-07

## 2024-09-05 RX ORDER — NALOXONE HYDROCHLORIDE 0.4 MG/ML
0.4 INJECTION, SOLUTION INTRAMUSCULAR; INTRAVENOUS; SUBCUTANEOUS
Status: ACTIVE | OUTPATIENT
Start: 2024-09-05 | End: 2024-09-07

## 2024-09-05 RX ORDER — MAGNESIUM HYDROXIDE/ALUMINUM HYDROXICE/SIMETHICONE 120; 1200; 1200 MG/30ML; MG/30ML; MG/30ML
30 SUSPENSION ORAL EVERY 8 HOURS PRN
Status: ACTIVE | OUTPATIENT
Start: 2024-09-05 | End: 2024-09-07

## 2024-09-05 RX ORDER — FLUMAZENIL 0.1 MG/ML
0.2 INJECTION, SOLUTION INTRAVENOUS
Status: ACTIVE | OUTPATIENT
Start: 2024-09-05 | End: 2024-09-07

## 2024-09-05 RX ORDER — METHOHEXITAL IN WATER/PF 100MG/10ML
SYRINGE (ML) INTRAVENOUS PRN
Status: DISCONTINUED | OUTPATIENT
Start: 2024-09-05 | End: 2024-09-05

## 2024-09-05 RX ORDER — ACETAMINOPHEN 325 MG/1
650 TABLET ORAL EVERY 4 HOURS PRN
Status: DISCONTINUED | OUTPATIENT
Start: 2024-09-05 | End: 2024-09-05

## 2024-09-05 RX ORDER — ACETAMINOPHEN 325 MG/1
650 TABLET ORAL EVERY 4 HOURS PRN
Status: DISCONTINUED | OUTPATIENT
Start: 2024-09-05 | End: 2024-09-13

## 2024-09-05 RX ORDER — LIDOCAINE 40 MG/G
CREAM TOPICAL
Status: DISCONTINUED | OUTPATIENT
Start: 2024-09-05 | End: 2024-09-07

## 2024-09-05 RX ORDER — MAGNESIUM SULFATE HEPTAHYDRATE 40 MG/ML
2 INJECTION, SOLUTION INTRAVENOUS
Status: DISCONTINUED | OUTPATIENT
Start: 2024-09-05 | End: 2024-09-07

## 2024-09-05 RX ORDER — NALOXONE HYDROCHLORIDE 0.4 MG/ML
0.2 INJECTION, SOLUTION INTRAMUSCULAR; INTRAVENOUS; SUBCUTANEOUS
Status: ACTIVE | OUTPATIENT
Start: 2024-09-05 | End: 2024-09-07

## 2024-09-05 RX ORDER — BENZOCAINE/MENTHOL 6 MG-10 MG
1 LOZENGE MUCOUS MEMBRANE 3 TIMES DAILY PRN
Status: ACTIVE | OUTPATIENT
Start: 2024-09-05 | End: 2024-09-07

## 2024-09-05 RX ORDER — DIGOXIN 125 MCG
125 TABLET ORAL DAILY
Status: DISCONTINUED | OUTPATIENT
Start: 2024-09-06 | End: 2024-09-17

## 2024-09-05 RX ORDER — ACYCLOVIR 200 MG/1
9.5 CAPSULE ORAL
Status: DISCONTINUED | OUTPATIENT
Start: 2024-09-05 | End: 2024-09-07

## 2024-09-05 RX ADMIN — POTASSIUM CHLORIDE 20 MEQ: 750 TABLET, EXTENDED RELEASE ORAL at 08:06

## 2024-09-05 RX ADMIN — ESCITALOPRAM OXALATE 10 MG: 10 TABLET ORAL at 08:00

## 2024-09-05 RX ADMIN — Medication 6.25 MG: at 17:32

## 2024-09-05 RX ADMIN — MIDAZOLAM 2 MG: 1 INJECTION INTRAMUSCULAR; INTRAVENOUS at 08:54

## 2024-09-05 RX ADMIN — ACETAMINOPHEN 975 MG: 325 TABLET ORAL at 00:11

## 2024-09-05 RX ADMIN — Medication 6.25 MG: at 08:09

## 2024-09-05 RX ADMIN — MAGNESIUM OXIDE TAB 400 MG (241.3 MG ELEMENTAL MG) 400 MG: 400 (241.3 MG) TAB at 08:01

## 2024-09-05 RX ADMIN — CLOPIDOGREL BISULFATE 75 MG: 75 TABLET ORAL at 08:00

## 2024-09-05 RX ADMIN — ACETAMINOPHEN 975 MG: 325 TABLET ORAL at 06:45

## 2024-09-05 RX ADMIN — FENTANYL CITRATE 25 MCG: 50 INJECTION, SOLUTION INTRAMUSCULAR; INTRAVENOUS at 08:55

## 2024-09-05 RX ADMIN — Medication 50 MG: at 09:45

## 2024-09-05 RX ADMIN — AMIODARONE HYDROCHLORIDE 200 MG: 200 TABLET ORAL at 08:00

## 2024-09-05 RX ADMIN — FUROSEMIDE 40 MG: 40 TABLET ORAL at 13:30

## 2024-09-05 RX ADMIN — OXYCODONE HYDROCHLORIDE AND ACETAMINOPHEN 1000 MG: 500 TABLET ORAL at 13:30

## 2024-09-05 RX ADMIN — ATORVASTATIN CALCIUM 80 MG: 80 TABLET, FILM COATED ORAL at 08:01

## 2024-09-05 RX ADMIN — Medication 6.25 MG: at 13:31

## 2024-09-05 RX ADMIN — LIDOCAINE HYDROCHLORIDE 30 ML: 20 SOLUTION ORAL at 08:40

## 2024-09-05 RX ADMIN — Medication 1 TABLET: at 20:27

## 2024-09-05 RX ADMIN — HEPARIN SODIUM 1350 UNITS/HR: 10000 INJECTION, SOLUTION INTRAVENOUS at 02:11

## 2024-09-05 RX ADMIN — SODIUM CHLORIDE 400 ML: 9 INJECTION, SOLUTION INTRAVENOUS at 08:35

## 2024-09-05 RX ADMIN — HEPARIN SODIUM 1350 UNITS/HR: 10000 INJECTION, SOLUTION INTRAVENOUS at 20:08

## 2024-09-05 RX ADMIN — EMPAGLIFLOZIN 10 MG: 10 TABLET, FILM COATED ORAL at 08:01

## 2024-09-05 RX ADMIN — Medication 1 TABLET: at 13:30

## 2024-09-05 RX ADMIN — TOPICAL ANESTHETIC 0.5 ML: 200 SPRAY DENTAL; PERIODONTAL at 08:46

## 2024-09-05 ASSESSMENT — ACTIVITIES OF DAILY LIVING (ADL)
ADLS_ACUITY_SCORE: 24

## 2024-09-05 ASSESSMENT — LIFESTYLE VARIABLES: TOBACCO_USE: 1

## 2024-09-05 NOTE — CONSULTS
Patient was seen inpatient for psychosocial assessment.   60 minutes spent with patient. 100% of visit consisted of counseling related to issues surrounding VAD.    Psychosocial Assessment   Name: Duane C Johnson     MRN:  [id number]        Patient Name / Age / Race: Duane C Johnson 74 year old    Source of Information: Patient   : Jessi Almendarez   Interview Date: 2024   Reason for Referral: Mechanical Circulatory Support     Current Living Situation   Location (City/State): [address] [place] [address]   With Whom: Living arrangements - the patient lives with their spouse.   Type of Home: single family   Working Phone? Yes    Three Pronged Outlet? Yes    Distance from Hospital: 48 miles - about 60-70 minutes pending traffic.   Need to Relocate Post Surgery? Yes    Discussed? Yes ; Pt reported finances are not an issue. Discussed relocating, Pt and wife open to relocation if needed. Requested discussion on if traffic interferes with time.     Vocational/Employment/Financial   Employment: Retired   Occupation: Working with computers at [employer]   Education: Graduated High school and 1 year of [employer]   [place]? No   Income: pension, spouse's income, and other: inheritance and stocks   Insurance: Medicare   Name of Private Insurance: [employer] Supplement   Medication Coverage: Yes    In current situation, pt. can afford medication and supply costs:  Yes    Other Financial Concerns/Issues: Patient denied financial concerns and indicated they are doing quite well.     Family/Social Support   Marital Status:    Partner Name: Melissa   Length of Time : 48 years   Partner s Employment: Senior Executive for [employer]   Relationship: stable/supportive   Children: None   Parents: Patient's parents are .   Siblings: None; spouse has a brother   Other Family or Friends Close by: Pt reports spouse has nieces and nephews within the  area.   Support System: available, helpful   Primary Support Person: Wife - Melissa   Issues: Patient uncertain if wife will be able to support 24/7 30-day caregiver plan. SW spoke with wife, Melissa, and she confirmed she will be able to support 24/7 for 30 day plan.     Activities/Functional Ability   Current Level: ambulatory   Daily Activities: Patient reports continuing to do research, go to his pull barn, collect items, walking, driving   Transportation: self      Medical Status   Patient s understanding of need for surgical intervention: Patient verbalized understanding rationale for VAD.   Advanced Directive? No;     Details: Patient felt he had started to work on this with his  and wife. Was receptive to SW leaving blank copy with him. NOK would be wife - Pt verbalized this being okay.   Adherence History: Patient reports following things asked of him, however noted occasionally being off an hour or two with meds. Per Chart Review, patient was referred to get neuropsych testing done 2x and did not get this completed.   Ability to Adhere to Complex Medical Regime: Yes     Behavioral   Chemical Dependency Issues: No - AUDIT-C: 1   Smoker? No; Patient reports smoking for about 30 years and reports stopping October 2023 following his heart attack.   Psychiatric impairment: No - MAULIK-7: 2, PHQ-9: 4   Coping Style/Strategies: Patient reported tinkering, relaxing, and problem solving.   Recent Legal History: No   Teaching Completed During Assessment Related To Mechanical Circulatory Support: Housing and relocation needs post surgery.  Caregiver needs post surgery.  Financial issues related to surgery.  Risks of alcohol use post surgery.  Common psychosocial stressors pre/post surgery.  Support group availability.   Psychosocial Risks Reviewed Related To Mechanical Circulatory Support: Increased stress related to your emotional, family, social, employment, or financial situation.  Affect on work and/or  disability benefits.  Affect on future health and life insurance.  Outcome expectations may not be met.  Mental Health risks: anxiety, depression, PTSD, guilt, grief and chronic fatigue.     Observed Behavior   Well informed? Yes  Angry? No   Process info well? Yes  Hostile? No   Evasive? No Oriented X3? Yes    Cautious/Suspicious? No Motivated? Yes    Appropriate Behavior? Yes  Depressed? No   Appropriate Affect? Yes  Anxious? No   Interview Observations: Patient was interactive and forthcoming throughout the assessment. He indicated wanting to share his knowledge with the world and presented motivated for VAD. He was observed to be frequently tangential throughout discussion.     Selection Criteria Met   Plan for Support Yes     Chemical Dependence Yes    Smoking Yes    Mental Health Yes    Adequate Finances Yes      Risk Issues:    -Pt reports he quit smoking in October 2023.   -No concerns with VAD implant    Final Evaluation/Assessment:     Social Work evaluation as part of LVAD evaluation. Pt endorses understanding of rationale for LVAD evaluation. Patient and wife reside in [place], [place] and have no children. Patient endorsed he and his wife discussing having their nephew come to the house to support on occasion with cooking meals, housework, etc. Pt is currently retired and enjoys tinkering in his pull barn and working on projects for a group he is a part of ([employer]). Pt had been independent and fairly active until recently and feeling more fatigued.    Pt's wife, Melissa, has been identified as the primary caregiver. SW discussed need to attend LVAD education and short and long term caregiver expectations and potential temporary relocation for 30 days w/24hr caregiver support during that period. Pt and wife denied questions and wife confirmed ability to provide 24hr care.    Social Work does not have concerns in the areas of chemical dependency, mental health, finances, or insurance. Pt  previously smoked, however quit in October of 2023.     From a psychosocial perspective, Pt appears to be an appropriate candidate to proceed with LVAD.    Frailty Score: 4    Patient understands risks and benefits of Mechanical Circulatory Support: Yes     Recommendation:    acceptable    Signature: Jessi Almendarez     Title: Licensed Clinical                Interview Date: September 5, 2024     Audit C Alcohol Screening Tool  AUDIT   Questions 0 1 2 3 4 Score   How often do you have a drink  containing alcohol? Never Monthly or less 2 to 4  times a  month 2 to 3  times a  week 4 or more  times a  week 1   How many drinks containing alcohol  do you have on a typical day when you are drinking? 1 or 2 3 or 4 5 or 6 7 to 9 10 or more 0   How often do you have more than five  or more drinks on one occasion? Never Less  than  monthly Monthly Weekly Daily or  almost  daily 0     Scoring: A score of 4 for adult men or 3 for adult women is an indication of hazardous drinking (risk for  physical or physiological harm). A score of 5 or more for adult men or 4 or more for adult women is an  indication of an alcohol use disorder.     MAULIK-7  0= Not at all  1=Several Days   2=Over half the days  3=Nearly every day    Feeling nervous, anxious, or on edge [ 0 ]  Not able to stop or control worrying [ 0 ]  Worrying too much about different things [ 0 ]  Trouble relaxing [ 1 ]  Being so restless that it's hard to sit still [ 0 ]  Becoming easily annoyed or irritable [ 1 ]  Feeling afraid as if something awful might happen [ 0 ]    Total Score    If you checked off any problems, how difficult have these made it for you to do your work, take care of things at home, or get along with other people?    Not difficult at all____X____  Somewhat difficult_______  Very difficult_______  Extremely difficult_______    Scoring  Scores of 5, 10, and 15 are taken as the cut-off points for mild, moderate and severe anxiety,  respectively. When using as a screening tool, further evaluation is recommended when the score is 10 or greater.       Source: Fredrick RL, Marge K, Guillermo ROMEOW, Hai B. A brief measure for assessing generalized anxiety  disorder. Arch Inern Med. 2006;166:3668-9957.    PATIENT HEALTH QUESTIONNAIRE-9 (PHQ - 9)      Over the last two weeks, how often have you been bothered by any the following symptoms?  Please use the following scale;  0 = Not at all  1 = Several days but less than half  2 = More than half of the days  3 = Nearly every day    1) Little interest or pleasure in doing things [  0  ]  2) Feeling down, depressed, or hopeless.[  0  ]  3) Trouble falling or staying asleep, or sleeping too much [  2  ]  4) Feeling tired or having little energy.[  1  ]  5) Poor appetite or overeating [  1  ]  6) Feeling bad about yourself - or that you are a failure or have let yourself or your family down [  0  ]  7) Trouble concentrating on things, such as reading the newspaper or watching television [  0  ]  8) Moving or speaking so slowly that other people could have noticed. Or the opposite-being fidgety or restless that you have been moving around a lot more than usual [  0  ]  9) Thoughts that you would be better off dead, or of hurting yourself in some way [  0  ]    Finally, how difficult have these problems made it for you to do your work, take care of things at home, or get along with other people?  Not difficult at all, somewhat difficult, very difficult or extremely difficult?  Somewhat difficult    Total Score: 4      Interpreting PHQ-9 Scores Actions Based on PH9 Score   Score Action  Minimal depression 0-4          < 4         The score suggests the patient may not need depression treatment   Mild depression 5-9                > 5 - 14  Physician uses clinical judgment about treatment, based on patient's duration of symptoms and functional impairment  Moderate depression 10-14   Moderately severe depression  15-19   > 15      Warrants treatment for depression, using antidepressant, psychotherapy and/or a combination of treatment.  Severe depression 20-27       * PHQ-9 is described in more detail at the [facility] on [facility] Primary [facility] website   [email or web address]/clinicians/toolkits/materials/forms/phq9/      MONTRELL Baig, LGSW, APSW  Heart Transplant/[facility]   [place]  Ph. [phone]

## 2024-09-05 NOTE — PROCEDURES
[facility]    Procedure: Cardioversion    Date/Time: 9/5/2024 10:03 AM    Performed by: Lu Arvizu APRN CNP  Authorized by: Vladimir Johnson MD      UNIVERSAL PROTOCOL   Site Marked: NA  Prior Images Obtained and Reviewed:  NA  Required items: Required blood products, implants, devices and special equipment available    Patient identity confirmed:  Arm band and verbally with patient  NA - No sedation, light sedation, or local anesthesia  Confirmation Checklist:  Patient's identity using two indicators and relevant allergies  Time out: Immediately prior to the procedure a time out was called    Universal Protocol: the Joint Commission Universal Protocol was followed       ANESTHESIA    Anesthesia was administered and monitored by anesthesiology.  See anesthesia documentation for details.    PROCEDURE DETAILS  Pre-procedure rhythm: atrial fibrillation  Patient position: patient was placed in a supine position  Chest area: chest area exposed  Electrodes: pads  Electrodes placed: anterior-posterior  Number of attempts: 1    Details of Attempts:  Echo staff performed MELBA prior and reported no intracardiac thrombus. 120J biphasic synchronized shock delivered with successful conversion.     Post-procedure rhythm: paced- a-v sequential  Complications: no complications      PROCEDURE    Patient Tolerance:  Patient tolerated the procedure well with no immediate complications      NADEGE Steiner CNP  Electrophysiology Consult Service  Securely message with [employer]   Text page via [facility] Paging/Directory

## 2024-09-05 NOTE — PLAN OF CARE
"[id number]  Patient remains hospitalized for acute CHF exacerbation. Cardioverted earlier today, remains in a-paced rhythm. Remains on hep gtt - Xa scheduled for tomorrow AM. Initiated on PO diuretics today. Complained of back pain - declined Tylenol this shift. Plan for LVAD education tomorrow with spouse. VSS. Ambulating with assist of 1.     Problem: Adult Inpatient Plan of Care  Goal: Plan of Care Review  Description: The Plan of Care Review/Shift note should be completed every shift.  The Outcome Evaluation is a brief statement about your assessment that the patient is improving, declining, or no change.  This information will be displayed automatically on your shift  note.  Outcome: Progressing  Goal: Patient-Specific Goal (Individualized)  Description: You can add care plan individualizations to a care plan. Examples of Individualization might be:  \"Parent requests to be called daily at 9am for status\", \"I have a hard time hearing out of my right ear\", or \"Do not touch me to wake me up as it startles  me\".  Outcome: Progressing  Goal: Absence of Hospital-Acquired Illness or Injury  Outcome: Progressing  Intervention: Identify and Manage Fall Risk  Recent Flowsheet Documentation  Taken 9/5/2024 1600 by Diane Aguila RN  Safety Promotion/Fall Prevention:   activity supervised   assistive device/personal items within reach   clutter free environment maintained   lighting adjusted   nonskid shoes/slippers when out of bed   patient and family education   room organization consistent   safety round/check completed   supervised activity  Intervention: Prevent and Manage VTE (Venous Thromboembolism) Risk  Recent Flowsheet Documentation  Taken 9/5/2024 1600 by Diane Aguila RN  VTE Prevention/Management: SCDs off (sequential compression devices)  Goal: Optimal Comfort and Wellbeing  Outcome: Progressing  Intervention: Monitor Pain and Promote Comfort  Recent Flowsheet Documentation  Taken 9/5/2024 1600 by " Mingo, Diane, RN  Pain Management Interventions: medication offered but refused  Goal: Readiness for Transition of Care  Outcome: Progressing     Problem: Risk for Delirium  Goal: Optimal Coping  Outcome: Progressing  Goal: Improved Behavioral Control  Outcome: Progressing  Goal: Improved Attention and Thought Clarity  Outcome: Progressing  Goal: Improved Sleep  Outcome: Progressing     Problem: Heart Failure  Goal: Optimal Coping  Outcome: Progressing  Goal: Optimal Cardiac Output  Outcome: Progressing  Goal: Stable Heart Rate and Rhythm  Outcome: Progressing  Goal: Optimal Functional Ability  Outcome: Progressing  Goal: Fluid and Electrolyte Balance  Outcome: Progressing  Goal: Improved Oral Intake  Outcome: Progressing  Goal: Effective Oxygenation and Ventilation  Outcome: Progressing  Intervention: Promote Airway Secretion Clearance  Recent Flowsheet Documentation  Taken 9/5/2024 1600 by Diane Aguila RN  Cough And Deep Breathing: done independently per patient  Goal: Effective Breathing Pattern During Sleep  Outcome: Progressing  Intervention: Monitor and Manage Obstructive Sleep Apnea  Recent Flowsheet Documentation  Taken 9/5/2024 1600 by Diane Aguila RN  Medication Review/Management:   medications reviewed   high-risk medications identified     Problem: Cardiovascular Surgery  Goal: Improved Activity Tolerance  Outcome: Progressing  Goal: Optimal Coping with Heart Surgery  Outcome: Progressing  Goal: Absence of Bleeding  Outcome: Progressing  Goal: Effective Bowel Elimination  Outcome: Progressing  Goal: Effective Cardiac Function  Outcome: Progressing  Goal: Optimal Cerebral Tissue Perfusion  Outcome: Progressing  Goal: Fluid and Electrolyte Balance  Outcome: Progressing  Goal: Blood Glucose Level Within Targeted Range  Outcome: Progressing  Goal: Absence of Infection Signs and Symptoms  Outcome: Progressing  Goal: Anesthesia/Sedation Recovery  Outcome: Progressing  Intervention: Optimize  Anesthesia Recovery  Recent Flowsheet Documentation  Taken 9/5/2024 1600 by Diane Aguila, RN  Safety Promotion/Fall Prevention:   activity supervised   assistive device/personal items within reach   clutter free environment maintained   lighting adjusted   nonskid shoes/slippers when out of bed   patient and family education   room organization consistent   safety round/check completed   supervised activity  Goal: Acceptable Pain Control  Outcome: Progressing  Intervention: Prevent or Manage Pain  Recent Flowsheet Documentation  Taken 9/5/2024 1600 by Diane Aguila RN  Pain Management Interventions: medication offered but refused  Goal: Nausea and Vomiting Relief  Outcome: Progressing  Goal: Effective Urinary Elimination  Outcome: Progressing  Goal: Effective Oxygenation and Ventilation  Outcome: Progressing  Intervention: Promote Airway Secretion Clearance  Recent Flowsheet Documentation  Taken 9/5/2024 1600 by Diane Aguila, RN  Cough And Deep Breathing: done independently per patient   Goal Outcome Evaluation:

## 2024-09-05 NOTE — PROGRESS NOTES
.Major Shift Events:  Successful MELBA/Cardioversion. Back in A paced rhythm. Soft BP's. Tolerating diet   Plan: Heparin therapeutic; Xa tomorrow   LVAD education tomorrow at 1000  For vital signs and complete assessments, please see documentation flowsheets

## 2024-09-05 NOTE — PLAN OF CARE
6C Physical Therapy - Per chart review and discussion with occupational therapy, Pt ambulating with SBA with SBA without AD, no LOB or unsteadiness, has been ambulating 500-700' at a time without rest rest breaks. Plan for OT to continue to follow to address functional mobility and cardiac rehab as needed. Pt with no skilled inpatient PT needs, Will complete consult and defer evaluation, please reconsult as appropriate if patient has decline in functional mobility requiring further skilled inpatient PT needs. Defer Discharge recommendations to occupational therapy.

## 2024-09-05 NOTE — CARE PLAN
[facility]     The SLUMS ([facility] Mental Status Exam) is a 30-point standardized cognitive screen used to identify the presence of cognitive deficits and/or to identify a change in cognition over time.  This screen assesses cognitive abilities in various domains.  (aging@\[facility]\"".[facility])     Patient's performance was as follows:     Total Score: 22/30        Scoring If High School Educated If Less than High School Educated   Normal 27-30 25-30   Mild Neurocognitive Disorder 21-26 20-24   Dementia 1-20 1-19         Score Interpretation: Please note that this examination is used to screen individuals to look for the presence of cognitive deficits and to identify changes in cognition over time.  This is not a diagnosis.  This examination can be followed by further cognitive assessments if appropriate and deemed necessary.    Patient had greatest difficulty on items assessing delayed recall with interference, visuospatial skills, and executive function plus extrapolation.

## 2024-09-05 NOTE — ANESTHESIA PREPROCEDURE EVALUATION
Anesthesia Pre-Procedure Evaluation    Patient: Duane C Johnson   MRN: [id number] : 1950        Procedure : Procedure(s):  Anesthesia cardioversion          Past Medical History:   Diagnosis Date    Benign essential hypertension     CAD (coronary artery disease)     Chronic systolic heart failure (H)     Hypertension     ST elevation myocardial infarction (STEMI), unspecified artery (H)     Ventricular fibrillation (H)       Past Surgical History:   Procedure Laterality Date    COLONOSCOPY N/A 3/23/2023    Procedure: COLONOSCOPY, FLEXIBLE, WITH LESION REMOVAL USING SNARE;  Surgeon: Jose Faustin MD;  Location: [facility]    CV CENTRAL VENOUS CATHETER PLACEMENT N/A 10/26/2023    Procedure: Central Venous Catheter Placement;  Surgeon: Rob Lyles MD;  Location:  [facility]    CV CORONARY ANGIOGRAM N/A 10/27/2023    Procedure: Coronary Angiogram;  Surgeon: Rob Lyles MD;  Location:  [facility]    CV CORONARY ANGIOGRAM N/A 10/26/2023    Procedure: Coronary Angiogram;  Surgeon: Rob Lyles MD;  Location:  [facility]    CV LEFT HEART CATH N/A 10/26/2023    Procedure: Left Heart Catheterization;  Surgeon: Rob Lyles MD;  Location:  [facility]    CV PCI N/A 10/27/2023    Procedure: Percutaneous Coronary Intervention;  Surgeon: Rob Lyles MD;  Location:  [facility]    CV PCI STENT DRUG ELUTING N/A 10/26/2023    Procedure: Percutaneous Coronary Intervention Stent;  Surgeon: Rob Lyles MD;  Location:  [facility]    CV RIGHT HEART CATH MEASUREMENTS RECORDED N/A 2024    Procedure: Heart Cath Right Heart Cath;  Surgeon: Rob Lyles MD;  Location:  [facility]    CV RIGHT HEART CATH MEASUREMENTS RECORDED N/A 9/3/2024    Procedure: Right Heart Catheterization;  Surgeon: Aaron Mesa MD;  Location:  [facility]    EP ICD  INSERT SINGLE N/A 2024    Procedure: Implantable Cardioverter Defibrillator Device & Lead Implant Dual;  Surgeon: Guero Black MD;  Location:  [facility]    INJECTION, HYDROGEL SPACER N/A 2024    Procedure: INJECTION, HYDROGEL SPACER AND FIDUCIAL MARKER PLACEMENT;  Surgeon: Stanislaw Barros MD;  Location: [facility]      Allergies   Allergen Reactions    Brilinta [Ticagrelor] Other (See Comments) and Difficulty breathing     Per pt and spouse, hyperventilation.    Clonazepam Other (See Comments)     Per spouse, acted like a zombie and he was shaky, could barely talk.      Social History     Tobacco Use    Smoking status: Former     Current packs/day: 0.00     Average packs/day: 0.3 packs/day for 30.0 years (7.5 ttl pk-yrs)     Types: Cigarettes     Start date: 10/26/1993     Quit date: 10/26/2023     Years since quittin.8     Passive exposure: Past    Smokeless tobacco: Never    Tobacco comments:     Quit 10/26/2023   Substance Use Topics    Alcohol use: Yes     Comment: 1-2 beers per day      Wt Readings from Last 1 Encounters:   24 61.9 kg (136 lb 8 oz)        Anesthesia Evaluation   Pt has had prior anesthetic. Type: MAC.        ROS/MED HX  ENT/Pulmonary:  - neg pulmonary ROS   (+)                tobacco use, Current use,                       Neurologic:  - neg neurologic ROS     Cardiovascular: Comment: anterior STEMI s/p PCI to the pLAD on 10/26/23 with a VT/VF arrest the next day    Ischemic cardiomyopathy.  25% EF.  CAD: stent  to LAD   Systolic CHF: currently asymptomatic.    Anticoagulated: eliquis and plavix    2024 Implantable Cardioverter Defibrillator Device & Lead Implant Dual          (+) Dyslipidemia hypertension- -  CAD angina- past MI - stent-. 1  Taking blood thinners   CHF etiology: Chronic combined systolic and diastolic heart failure                    valvular problems/murmurs type: MR     Previous cardiac testing   Echo: Date: 2024  Results:   The left ventricle is moderately dilated. Left ventricular hypertrophy is  noted by two-dimensional echocardiography. Proximal septal thickening is  noted. Left ventricular systolic function is moderate to severely reduced. The  visual ejection fraction is 25-30%. Biplane LVEF is 25%. Diastolic Doppler  findings (E/E' ratio and/or other parameters) suggest left ventricular filling  pressures are increased. There is severe hypokinesis to akinesis of the mid  anterior/anterolateral/anteroseptal/inferoseptal walls and all apical segments  of the left ventricle. There is no thrombus seen in the left ventricle.  2. The right ventricle is normal size. The right ventricular systolic function  is normal.  3. The left atrium is moderately dilated. Right atrial size is normal. There  is no color Doppler evidence of an atrial shunt.  4. There is moderate to mod-severe (2-3+) mitral regurgitation.  5. There is mild to moderate (1-2+) tricuspid regurgitation.Right ventricular  systolic pressure is elevated, consistent with moderate pulmonary  hypertension.  6. No pericardial effusion.  7. In direct comparison to the previous study dated 10/30/2023, there has been  an interval increase in the degree of mitral regurgitation. The other findings  are similar.  ______________________________________________________________________________  Left Ventricle  The left ventricle is moderately dilated. Left ventricular hypertrophy is  noted by two-dimensional echocardiography. Proximal septal thickening is  noted. Left ventricular systolic function is moderate to severely reduced. The  visual ejection fraction is 25-30%. Biplane LVEF is 25%. Diastolic Doppler  findings (E/E' ratio and/or other parameters) suggest left ventricular filling  pressures are increased. There is severe hypokinesis to akinesis of the mid  anterior/anterolateral/anteroseptal/inferoseptal walls and all apical segments  of the left ventricle. There is no  thrombus seen in the left ventricle.     Right Ventricle  The right ventricle is normal size. The right ventricular systolic function is  normal.     Atria  The left atrium is moderately dilated. Right atrial size is normal. There is  no color Doppler evidence of an atrial shunt.     Mitral Valve  There is moderate to mod-severe (2-3+) mitral regurgitation.     Tricuspid Valve  There is mild to moderate (1-2+) tricuspid regurgitation. The right  ventricular systolic pressure is approximated at 51.6 mmHg plus the right  atrial pressure. IVC diameter <2.1 cm collapsing >50% with sniff suggests a  normal RA pressure of 3 mmHg. Right ventricular systolic pressure is elevated,  consistent with moderate pulmonary hypertension.     Aortic Valve  There is mild trileaflet aortic sclerosis. No aortic regurgitation is present.  No aortic stenosis is present.     Pulmonic Valve  There is mild (1+) pulmonic valvular regurgitation. There is no pulmonic  valvular stenosis.     Vessels  The aortic root is normal size. Normal size ascending aorta. The inferior vena  cava is normal.     Pericardium  There is no pericardial effusion. Moderate left pleural effusion.     Rhythm  Sinus rhythm was noted.    Stress Test:  Date: Results:    ECG Reviewed:  Date: 2/9/2024 Results:  SR  Cath:  Date: 4/16/2024 Results:      METS/Exercise Tolerance:     Hematologic:  - neg hematologic  ROS     Musculoskeletal:       GI/Hepatic:  - neg GI/hepatic ROS     Renal/Genitourinary: Comment: Prostate cancer       Endo:  - neg endo ROS     Psychiatric/Substance Use:     (+) psychiatric history anxiety       Infectious Disease:  - neg infectious disease ROS     Malignancy:   (+) Malignancy, History of Prostate.Prostate CA Active status post.      Other:            Physical Exam    Airway        Mallampati: I   TM distance: > 3 FB   Neck ROM: full   Mouth opening: > 3 cm    Respiratory Devices and Support         Dental       (+) Multiple visibly decayed,  "broken teeth      Cardiovascular   cardiovascular exam normal          Pulmonary   pulmonary exam normal                OUTSIDE LABS:  CBC:   Lab Results   Component Value Date    WBC 13.4 (H) 09/05/2024    WBC 12.7 (H) 09/04/2024    HGB 9.6 (L) 09/05/2024    HGB 9.1 (L) 09/04/2024    HCT 29.6 (L) 09/05/2024    HCT 28.8 (L) 09/04/2024     09/05/2024     09/04/2024     BMP:   Lab Results   Component Value Date     (L) 09/05/2024     (L) 09/04/2024    POTASSIUM 4.4 09/05/2024    POTASSIUM 4.4 09/04/2024    CHLORIDE 101 09/05/2024    CHLORIDE 103 09/04/2024    CO2 24 09/05/2024    CO2 19 (L) 09/04/2024    BUN 42.1 (H) 09/05/2024    BUN 41.1 (H) 09/04/2024    CR 1.25 (H) 09/05/2024    CR 1.35 (H) 09/04/2024     (H) 09/05/2024    GLC 95 09/04/2024     COAGS:   Lab Results   Component Value Date    PTT 57 (H) 09/04/2024    INR 1.35 (H) 09/05/2024     POC: No results found for: \"BGM\", \"HCG\", \"HCGS\"  HEPATIC:   Lab Results   Component Value Date    ALBUMIN 2.7 (L) 09/05/2024    PROTTOTAL 6.0 (L) 09/05/2024    ALT 22 09/05/2024    AST 27 09/05/2024    ALKPHOS 132 09/05/2024    BILITOTAL 0.3 09/05/2024     OTHER:   Lab Results   Component Value Date    PH 7.42 10/27/2023    LACT 0.8 08/26/2024    A1C 4.8 03/13/2023    PRANAV 8.2 (L) 09/05/2024    PHOS 3.5 09/04/2024    MAG 2.1 09/05/2024    LIPASE 28 12/30/2023    TSH 2.45 09/04/2024    T4 1.49 12/30/2023       Anesthesia Plan    ASA Status:  3    NPO Status:  NPO Appropriate    Anesthesia Type: General.     - Airway: Native airway              Consents            Postoperative Care            Comments:               Marcelo Morales MD    I have reviewed the pertinent notes and labs in the chart from the past 30 days and (re)examined the patient.  Any updates or changes from those notes are reflected in this note.             "

## 2024-09-05 NOTE — PROGRESS NOTES
"CLINICAL NUTRITION SERVICES       Nutrition Prescription    RECOMMENDATIONS FOR MDs/PROVIDERS TO ORDER:  See prior RD note 9/4 for full assessment note    Malnutrition Status:    Moderate malnutrition (minimally) in the context of chronic illness    Recommendations already ordered by Registered Dietitian (RD):  None additionally at this time, see prior notes    Future/Additional Recommendations:  See prior RD notes     Updating malnutrition     Pt on the phone, then sleeping, busy with RN, and now busy with social work during attempts on 9/5    MALNUTRITION  % Intake: Decreased intake does not meet criteria   % Weight Loss: Weight loss does not meet criteria -Per previous nutrition note.   Subcutaneous Fat Loss: Facial region, Upper arms: Mild minimally per visualization while pt sleeping  Muscle Loss: Temporal, Thoracic region (clavicle, acromium bone, deltoid, trapezius, pectoral), Dorsal hand: Mild minimally per visualization while pt sleeping  Fluid Accumulation/Edema: None noted -Per previous nutrition note.   Malnutrition Diagnosis: Moderate malnutrition (minimally) in the context of chronic illness    INTERVENTIONS:  Implementation:  See prior RD notes  Message sent to [facility] coordinator (who requested RD revisit pt).     Monitoring/Evaluation  Progress toward goals will be monitored and evaluated per protocol. Pt may also have nutrition follow-up outpatient.     Nutrition will continue to follow inpatient.      Opal Esposito, MS, RD, LD, CNSC      No longer available via pager  6C (beds [id number] and [phone]): Vocera \"6C Clinical Dietitian\"   Weekend/Holiday: Vocera \"Weekend Clinical Dietitian\"   "

## 2024-09-05 NOTE — PROGRESS NOTES
[facility]  Cardiology II Service / Advanced Heart Failure  Daily Progress Note    Patient: Duane C Johnson      : 1950      MRN: [id number]    Assessment/Plan:   Duane C Johnson is a 74 year old male pmhx of anterior STEMI s/p PCI to the pLAD on 10/26/23 with a VT/VF arrest the next day (10/27) with patent stents and unchanged anatomy on repeat angiogram. Placed on amiodarone and discharged 23, ICD was not indicated as this was within 48h of his MI. His EF prior to discharge was 20-30% with a large area of akinesis in the LAD, placed on apixaban due to this (Apixaban dcd on 24). Has had multiple admissions for HF exacerbation last year.  Admitted on 24. He presents for 2 weeks of worsening fatigue; found to have BNP of 16k and L pleural effusion. Admitted for diuresis and RHC.     Today's changes:   - Successful cardioversion, now in A-paced rhythm   - MELBA without thrombus   - Start Lasix 40 mg daily, trial this as maintenance dose   - Continuing Captopril at 6.25 TID. Tolerating this dose without dizziness   - Continue Digoxin 250mcg   - Continue evaluation for LVAD   - Neuropsych inpatient eval   - Discontinued metoprolol due to low cardiac output    # Acute heart failure exacerbation  #Possible ambulatory shock  # HFrEf 2/2 ICM (EF 15-20% by TTE 24)  Fluid status: Euvolemic  ACEi/ARB/ARNi/afterload reduction: Captopril 6.25 mg TID, hold for SBP < 90  BB: Hold metoprolol due to reduced CO and borderline BP  Aldosterone antagonist: unable to start d/t hypotension   SGLT2i: Empagliflozin 10mg  SCD prophylaxis: s/p ICD 2024  NSAID use: contraindicated  Antiarrhythmic: Amiodarone 200mg   Diuretic: Start 40 mg daily Lasix for maintenance on     Echo from  with EF 15-20% and trace MR. See below for full report.    RHC from 9/3 showing normal R sided pressures, mildly increased L sided pressures, reduced CO. See below for full report.  BP  as low as 84/62 (MAP 69), has remained asymptomatic other than fatigue and mild SOB.    - BNP of 16k on admission with L sided pleural effusion   - Continuing to hold Metoprolol, Jardiance was restarted   - Lasix 40 mg daily for maintenance, started 9/5   - Low intensity heparin gtt     - Continue Captopril 6.25 TID    - Continue Digoxin 250mcg    #AFib  # h/o VT/VF arrest in 2023  # ICD placed on 5/8/24  Was on apixaban for low EF (akinesis of the mid-distal anterior,mid anteroseptum, distal septum and the apex) and questionable history of Afib. Since there was no episode of Afib captured apixaban was stopped on 7/5/24. Device interrogation showing AF episode started on 8/28/24. Converted to A-paced rhythm after cardioversion on 9/5.   - PTA Amio 200, Magnesium 400, Potassium 20  - Holding PTA Metoprolol   - Mag and Potassium replacement per nursing protocol  - Had successful cardioversion on 9/5    #LVAD evaluation   Pt has receiving financial clearance and LVAD evaluation is proceeding.  - Dental evaluation shows carries in all remaining teeth. Will need extraction  - Urology consulted for history of prostate cancer. Has completed therapy and last PSA was undetectable. This is not an obstacle to LVAD  - Abd US without evidence of chronic liver disease.   - No significant carotid stenosis   - ABIs normal   - Needs inpatient neuropsych eval (team contacted)    # CAD s/p PCI to LAD  # STEMI  Anterior STEMI on 10/26/23 with a VT/VF arrest the next day. Repeat angio showed patent stents and unchanged anatomy.   - PTA plavix, Atorvastatin 80    #DOMENICA  Baseline Cr normal at ~1. Cr of 1.55 on 8/24 with this acute episode of HF. Concern for type 1 cardiorenal syndrome. uPCR negative. Improving.    - Diuresis as above  - avoid nephrotoxins     ================================  Chronic Problems:    #Prostate Cancer  Diagnosed in March 2023. Underwent Leuprolide on 3/19/24 and 6/11/24. Completed radiation treatment on 6/27/24    - Consider testosterone level as outpatient to assess for recovery after ADT    #L inguinal hernia  Pt reports this has been present for some time. Not causing any pain. Not a surgical candidate with current HF status so will not consult surgery at this time.     Hospital Checklist:  DVT Prophylaxis: Heparin gtt  Code Status: Full Code  Bowel regimen: PRN  Diet/Nutrition: 2L fluid and 2G sodium restriction.   Lines: Left PIV    Disposition Planning:  3-5 days for LVAD evaluation    Staffed w/ Dr. Sloan.    Danial Fofana MD  PGY3, Internal Medicine  Cardiology 2 Service     09/05/2024  ==================================    Subjective:   No acute events. Went for cardioversion this AM. No acute concerns.     Objective:     Vitals:    09/03/24 1356 09/04/24 0511 09/05/24 0400   Weight: 62.1 kg (136 lb 14.5 oz) 61.8 kg (136 lb 3.2 oz) 61.9 kg (136 lb 8 oz)     Vital Signs with Ranges  Temp:  [97.6  F (36.4  C)-98.9  F (37.2  C)] 97.6  F (36.4  C)  Pulse:  [60-95] 65  Resp:  [7-26] 16  BP: ()/(47-72) 96/70  SpO2:  [90 %-100 %] 98 %  I/O last 3 completed shifts:  In: 1134.53 [P.O.:840; I.V.:294.53]  Out: 1500 [Urine:1500]    Exam:  General: No acute distress  HEENT: Normocephalic, atraumatic  CV: RRR, no murmurs   Lungs: On RA, minimal crackles. No wheezes, no increased WOB  Abd: Soft, non-tender, non-distended  Ext: Warm and well-perfused, no lower extremity edema  Neuro: Awake, alert, conversational, moving all extremities spontaneously throughout examination    Medications   Current Facility-Administered Medications   Medication Dose Route Frequency Provider Last Rate Last Admin    Give   of usual dose of LONG ACTING insulin AM of procedure IF diabetic   Does not apply Continuous PRN Vladimir Johnson MD        heparin 25,000 units in 0.45% NaCl 250 mL ANTICOAGULANT infusion  0-5,000 Units/hr Intravenous Continuous Cristobal Fisher MD 13.5 mL/hr at 09/05/24 0754 1,350 Units/hr at 09/05/24 0754    May take  oral meds with a sip of water, the morning of Cardioversion procedure.       Does not apply Continuous PRN Vladimir Johnson MD        sodium chloride 0.9 % infusion  1,000 mL Intravenous Continuous Narciso Liu  mL/hr at 09/05/24 0835 400 mL at 09/05/24 0835    sodium chloride 0.9 % infusion   Intravenous Continuous Hawa Keita PA-C         Current Facility-Administered Medications   Medication Dose Route Frequency Provider Last Rate Last Admin    amiodarone (PACERONE) tablet 200 mg  200 mg Oral Daily Cristobal Fisher MD   200 mg at 09/05/24 0800    atorvastatin (LIPITOR) tablet 80 mg  80 mg Oral Daily Cristobal Fisher MD   80 mg at 09/05/24 0801    captopril (CAPOTEN) half-tab 6.25 mg  6.25 mg Oral TID AC Cristobal Fisher MD   6.25 mg at 09/05/24 1331    clopidogrel (PLAVIX) tablet 75 mg  75 mg Oral Daily Cristobal Fisher MD   75 mg at 09/05/24 0800    digoxin (LANOXIN) tablet 250 mcg  250 mcg Oral Daily Cristobal Fisher MD   250 mcg at 09/04/24 1205    empagliflozin (JARDIANCE) tablet 10 mg  10 mg Oral Daily Cristobal Fisher MD   10 mg at 09/05/24 0801    escitalopram (LEXAPRO) tablet 10 mg  10 mg Oral Daily Cristobal Fisher MD   10 mg at 09/05/24 0800    furosemide (LASIX) tablet 40 mg  40 mg Oral Daily Danial Fofana MD   40 mg at 09/05/24 1330    magnesium oxide (MAG-OX) tablet 400 mg  400 mg Oral Daily Cristobal Fisher MD   400 mg at 09/05/24 0801    multivitamin w/minerals (THERA-VIT-M) tablet 1 tablet  1 tablet Oral BID Cristobal Fisher MD   1 tablet at 09/05/24 1330    potassium chloride tray ER (KLOR-CON M10) CR tablet 20 mEq  20 mEq Oral Daily Cristobal Fisher MD   20 mEq at 09/05/24 0806    sodium chloride (PF) 0.9% PF flush 3 mL  3 mL Intravenous Q8H Hawa Keita PA-C        sodium chloride (PF) 0.9% PF flush 3 mL  3 mL Intravenous Q8H Vladimir Johnson MD        vitamin C (ASCORBIC ACID) tablet 1,000 mg  1,000 mg Oral Daily Cristobal Fisher MD   1,000 mg at 09/05/24 1330       Data   Recent Labs    Lab 24  1047 24  0422 24  0626 24  1458 24  0552 24  0440   WBC  --  13.4* 12.7*  --  12.7*  --    HGB  --  9.6* 9.1* 9.4* 9.0*  --    MCV  --  96 96  --  96  --    PLT  --  318 329  --  290  --    INR  --  1.35* 1.31*  --   --  1.30*   NA  --  133* 134*  --  132*  --    POTASSIUM 4.5 4.4 4.4  --  4.5 4.5   CHLORIDE  --  101 103  --  102  --    CO2  --  24 19*  --  21*  --    BUN  --  42.1* 41.1*  --  35.4*  --    CR  --  1.25* 1.35*  --  1.19*  --    ANIONGAP  --  8 12  --  9  --    PRANAV  --  8.2* 8.4*  --  8.1*  --    GLC  --  111* 95  --  105*  --    ALBUMIN  --  2.7* 2.6*  --  2.9*  --    PROTTOTAL  --  6.0* 6.2*  --  6.1*  --    BILITOTAL  --  0.3 0.4  --  0.3  --    ALKPHOS  --  132 91  --  98  --    ALT  --  22 22  --  30  --    AST  --  27 32  --  44  --      RHC 9/3/24    RA:   RV:39/9  PA:  PCWP:16/22/15    Pa Sat:48.2%  Goldy; CO/CI: 2.91/1.71  Thermodilution; CO/CI:3.53/2.08   Right sided filling pressures are normal.   Left sided filling pressures are mildly elevated. Mild elevated pulmonary hypertension.   Reduced cardiac output level.         Echocardiogram Complete   Result Value    LVEF  15-20% (severely reduced)    [facility]    [id number]  [id number]  [id number]  [id number]^ROXANA^SONIA     [facility]  Echocardiography Laboratory  [address] [place] [id number]     Name: JOHNSON, DUANE C  MRN: [id number]  : 1950  Study Date: 2024 08:20 AM  Age: 74 yrs  Gender: Male  Patient Location: [facility]  Reason For Study: CHF  Ordering Physician: SONIA SCHMIDT  Performed By: Yessenia Cano RDCS     BSA: 1.7 m2  Height: 66 in  Weight: 145 lb  HR: 71  BP: 94/73 mmHg  ______________________________________________________________________________  Procedure  Complete Portable Echo Adult. Contrast Optison. Optison (NDC #7413-8248-00)  given intravenously. Patient was given 6 ml mixture of 3 ml Optison and 6  ml  saline. 3 ml wasted.  ______________________________________________________________________________  Interpretation Summary  Left ventricular function is decreased. The ejection fraction is 15-20%  (severely reduced).  Moderate left ventricular dilation is present.  Apical wall akinesis is present.  Severe diffuse hypokinesis is present.  There is no thrombus seen in the left ventricle.  The right ventricle is normal size.  Global right ventricular function is normal.  Mild functional mitral insufficiency is present.  Mild to moderate tricuspid functional insufficiency is present.  Pulmonary hypertension is present.The right ventricular systolic pressure is  40mmHg above the right atrial pressure.  IVC diameter and respiratory changes fall into an intermediate range  suggesting an RA pressure of 8 mmHg.     This study was compared with the study from 2/19/2024 .MR, TR improved. LVEF  similar in both studies compared side to side. So overall no change in LVEF  ______________________________________________________________________________  Left Ventricle  Left ventricular wall thickness is normal. Moderate left ventricular dilation  is present. Left ventricular diastolic function is not assessable. Left  ventricular function is decreased. The ejection fraction is 15-20% (severely  reduced). Apical wall akinesis is present. Severe diffuse hypokinesis is  present. There is no thrombus seen in the left ventricle.     Right Ventricle  The right ventricle is normal size. Global right ventricular function is  normal. A pacemaker lead is noted in the right ventricle.     Atria  Moderate left atrial enlargement is present. Moderate right atrial enlargement  is present.     Mitral Valve  Mild mitral insufficiency is present.     Aortic Valve  Trileaflet aortic sclerosis without stenosis. On Doppler interrogation, there  is no significant stenosis or regurgitation.     Tricuspid Valve  Mild to moderate tricuspid  insufficiency is present. The right ventricular  systolic pressure is approximated at 39.7 mmHg plus the right atrial pressure.  Pulmonary hypertension is present. The right ventricular systolic pressure is  40mmHg above the right atrial pressure.     Pulmonic Valve  On Doppler interrogation, there is no significant stenosis or regurgitation.     Vessels  The aorta root is normal. IVC diameter and respiratory changes fall into an  intermediate range suggesting an RA pressure of 8 mmHg.     Pericardium  No pericardial effusion is present.     Compared to Previous Study  This study was compared with the study from 2024 . MR, TR improved. LVEF  similar in both studies compared side to side. So overall no change in LVEF.  ______________________________________________________________________________  MMode/2D Measurements & Calculations     IVSd: 0.89 cm  LVIDd: 6.0 cm  LVIDs: 5.5 cm  LVPWd: 0.79 cm  FS: 7.1 %  LV mass(C)d: 197.0 grams  LV mass(C)dI: 112.9 grams/m2  LVOT diam: 2.0 cm  LVOT area: 3.2 cm2  EF Biplane: 16.8 %  LA Volume (BP): 71.3 ml  LA Volume Index (BP): 41.0 ml/m2  RV Base: 4.6 cm  RWT: 0.27     TAPSE: 1.0 cm     Doppler Measurements & Calculations  MV E max ivan: 82.7 cm/sec  LV V1 max P.9 mmHg  LV V1 max: 84.2 cm/sec  LV V1 VTI: 13.8 cm  MR PISA: 4.2 cm2  MR ERO: 0.33 cm2  MR volume: 35.7 ml  SV(LVOT): 43.8 ml  SI(LVOT): 25.1 ml/m2  PA acc time: 0.08 sec  TR max ivan: 315.1 cm/sec  TR max P.7 mmHg  RV S Ivan: 10.7 cm/sec     ______________________________________________________________________________  Report approved by: Jeanine MEJIA 2024 11:05 AM            Examination: XR CHEST 2 VIEWS 2024 3:06 PM     Indication: Shortness of breath     Comparison: Radiograph 2024. CT 2023.     Findings:  PA and lateral views of the chest. Left chest wall implantable cardiac  defibrillator with grossly intact leads/shock coil. Coronary stenting.  Trachea is midline. Stable  mild cardiomegaly. Mild blunting of the  left costophrenic angle, likely representing small pleural effusion.  No pneumothorax. No focal consolidation or any definite abnormal  airspace opacities. No acute or suspicious osseous abnormality.  Unremarkable visualized abdomen.      Impression   Impression:   1. Stable mild cardiomegaly with implantable cardiac defibrillator and  coronary stenting.  2. Small left pleural effusion.     I have personally reviewed the examination and initial interpretation  and I agree with the findings.     PANCHITO EID MD         SYSTEM ID:  [id number]      12/6/2023 CMR  Clinical history: 73 year old with anterior STEMI, cardiac arrest in Oct 2023  Comparison CMR: none  1. The left ventricle is severely enlarged. There is wall thinning and akinesis of the mid-distal anterior,  mid anteroseptum, distal septum and the apex  The global systolic function is severely reduced. The LVEF is 28%.  2. The right ventricle is normal in cavity size. The global systolic function is normal. The RVEF is 52%.   3. The right atrium is normal and the left atrium is severely enlarged.  4. There is mild mitral regurgitation.   5. There is transmural late gadolinium enhancement in mid-distal anterior, mid anteroseptum, distal  septum and the apex consistent with a large infarction in the LAD territory.   6. There is no pericardial effusion.  7. There is no intracardiac thrombus.  8. There are bilateral pleural effusions.  CONCLUSIONS:   Ischemic cardiomyopathy with a large anteroapical infarction.   Severely reduced left ventricular function and normal right ventricular function, LVEF 28% and RVEF 52%.     Cardiac Catheterization:  10/26/23    1st Mrg lesion is 20% stenosed.    Prox LAD to Mid LAD lesion is 100% stenosed.    1st Diag-1 lesion is 80% stenosed.    1st Diag-2 lesion is 50% stenosed.    Dist LAD lesion is 100% stenosed.    RPDA lesion is 70% stenosed.    Dist RCA lesion is 40% stenosed.      Anterior STEMI  Two vessel obstructive CAD (100% pLAD occlusion, 70% rPDA stenosis, 80% diagonal 1 stenosis)  PCI of pLAD to mLAD with BRENDA x 1 (Synergy 2.50x 28 mm) post dilated to 2.75 vessel  CVC placement in RIJ  LVEDP of 26 mmHg  Hemostasis of RRA with TR band      2/19/2024 Echo  Interpretation Summary     1. The left ventricle is moderately dilated. Left ventricular hypertrophy is  noted by two-dimensional echocardiography. Proximal septal thickening is  noted. Left ventricular systolic function is moderate to severely reduced. The  visual ejection fraction is 25-30%. Biplane LVEF is 25%. Diastolic Doppler  findings (E/E' ratio and/or other parameters) suggest left ventricular filling  pressures are increased. There is severe hypokinesis to akinesis of the mid  anterior/anterolateral/anteroseptal/inferoseptal walls and all apical segments  of the left ventricle. There is no thrombus seen in the left ventricle.  2. The right ventricle is normal size. The right ventricular systolic function  is normal.  3. The left atrium is moderately dilated. Right atrial size is normal. There  is no color Doppler evidence of an atrial shunt.  4. There is moderate to mod-severe (2-3+) mitral regurgitation.  5. There is mild to moderate (1-2+) tricuspid regurgitation.Right ventricular  systolic pressure is elevated, consistent with moderate pulmonary  hypertension.  6. No pericardial effusion.  7. In direct comparison to the previous study dated 10/30/2023, there has been  an interval increase in the degree of mitral regurgitation. The other findings  are similar.     RHC 5/6/24  RA: 10/9/6 mmHg  RV: 61/11 mmHg  PA: 63/24/38 mmHg  PCWP: Unable to obtain accurate measurement  CO/CI (Goldy): 3.89/2.3 L/min/m2    PA sat: 64%  MAP: 85 mmHg       Right sided filling pressures are normal.    Mild elevated pulmonary hypertension.    Normal cardiac output level.        Device interrogated on 8/30/24  Device: Mogotest [id number]  RESONATE HF ICD  Normal device function.  Mode: DDDR  bpm  AP: 33%  : 3%  Intrinsic rhythm: Afib w/ VS  bpm  Thoracic Impedance: Below reference line, suggests possible intrathoracic fluid accumulation.  Lead Trends Appear Stable.  Estimated battery longevity to RRT = 12.5 years.    Atrial Arrhythmia: AF episode began on 8/28/24  AF Avenel: 11%  Anticoagulant: Eliquis  Ventricular Arrhythmia: None  Setting Changes: Rate response turned ON in device, pacing mode changed to DDDR from DDD, fallback rate during AT/AF episodes 70 bpm.

## 2024-09-05 NOTE — PROGRESS NOTES
Spoke with patient and spouse Melissa (P: [phone]) will plan for pre vad education tomorrow at 10am.   Franchesca Haddad RN BSN   VAD Coordinator     24/7 On-Call VAD Pager: [phone] opt 4, ask to page VAD coordinator on call

## 2024-09-05 NOTE — PRE-PROCEDURE
GENERAL PRE-PROCEDURE:   Procedure:  Transesophageal echocardiogram with conscious sedation, cardioversion with anesthesia  Date/Time:  9/5/2024 8:33 AM    Verbal consent obtained?: Yes    Risks and benefits: Risks, benefits and alternatives were discussed    Consent given by:  Patient  Patient states understanding of procedure being performed: Yes    Patient's understanding of procedure matches consent: Yes    Procedure consent matches procedure scheduled: Yes    Expected level of sedation:  Moderate  Appropriately NPO:  Yes  ASA Class:  4  Mallampati  :  Grade 2- soft palate, base of uvula, tonsillar pillars, and portion of posterior pharyngeal wall visible  Lungs:  Lungs clear with good breath sounds bilaterally  Heart:  A-fib  History & Physical reviewed:  History and physical reviewed and no updates needed  Statement of review:  I have reviewed the lab findings, diagnostic data, medications, and the plan for sedation    Eric Dixon, PGY-6  Cardiovascular Disease Fellow

## 2024-09-05 NOTE — ANESTHESIA CARE TRANSFER NOTE
Patient: Duane C Johnson    Procedure: Procedure(s):  Anesthesia cardioversion       Diagnosis: Atrial fibrillation, unspecified type (H) [I48.91]  Diagnosis Additional Information: No value filed.    Anesthesia Type:   No value filed.     Note:    Oropharynx: oropharynx clear of all foreign objects  Level of Consciousness: drowsy  Oxygen Supplementation: nasal cannula    Independent Airway: airway patency satisfactory and stable  Dentition: dentition unchanged  Vital Signs Stable: post-procedure vital signs reviewed and stable  Report to RN Given: handoff report given  Patient transferred to: Telemetry/Step Down Unit    Handoff Report: Identifed the Patient, Identified the Reponsible Provider, Reviewed the pertinent medical history, Discussed the surgical course, Reviewed Intra-OP anesthesia mangement and issues during anesthesia, Set expectations for post-procedure period and Allowed opportunity for questions and acknowledgement of understanding    Vitals:  Vitals Value Taken Time   BP 76/55 09/05/24 0952   Temp     Pulse 61 09/05/24 0953   Resp 13 09/05/24 0953   SpO2 100 % 09/05/24 0953   Vitals shown include unfiled device data.    Electronically Signed By: NADEGE Jalloh CRNA  September 5, 2024  9:54 AM

## 2024-09-06 LAB
ALBUMIN SERPL BCG-MCNC: 2.7 G/DL (ref 3.5–5.2)
ALP SERPL-CCNC: 84 U/L (ref 40–150)
ALT SERPL W P-5'-P-CCNC: 17 U/L (ref 0–70)
ANION GAP SERPL CALCULATED.3IONS-SCNC: 9 MMOL/L (ref 7–15)
AST SERPL W P-5'-P-CCNC: 22 U/L (ref 0–45)
BASOPHILS # BLD AUTO: 0 10E3/UL (ref 0–0.2)
BASOPHILS NFR BLD AUTO: 0 %
BILIRUB SERPL-MCNC: 0.3 MG/DL
BUN SERPL-MCNC: 31.2 MG/DL (ref 8–23)
CALCIUM SERPL-MCNC: 8.4 MG/DL (ref 8.8–10.4)
CHLORIDE SERPL-SCNC: 103 MMOL/L (ref 98–107)
CREAT SERPL-MCNC: 1.1 MG/DL (ref 0.67–1.17)
CREAT SERPL-MCNC: 1.11 MG/DL (ref 0.67–1.17)
EGFRCR SERPLBLD CKD-EPI 2021: 70 ML/MIN/1.73M2
EGFRCR SERPLBLD CKD-EPI 2021: 70 ML/MIN/1.73M2
EOSINOPHIL # BLD AUTO: 0 10E3/UL (ref 0–0.7)
EOSINOPHIL NFR BLD AUTO: 0 %
ERYTHROCYTE [DISTWIDTH] IN BLOOD BY AUTOMATED COUNT: 15.8 % (ref 10–15)
GLUCOSE SERPL-MCNC: 104 MG/DL (ref 70–99)
HCO3 SERPL-SCNC: 23 MMOL/L (ref 22–29)
HCT VFR BLD AUTO: 29 % (ref 40–53)
HGB BLD-MCNC: 9.1 G/DL (ref 13.3–17.7)
IMM GRANULOCYTES # BLD: 0.1 10E3/UL
IMM GRANULOCYTES NFR BLD: 1 %
INR PPP: 1.3 (ref 0.85–1.15)
LABORATORY REPORT: NORMAL
LYMPHOCYTES # BLD AUTO: 0.5 10E3/UL (ref 0.8–5.3)
LYMPHOCYTES NFR BLD AUTO: 4 %
MAGNESIUM SERPL-MCNC: 2.1 MG/DL (ref 1.7–2.3)
MCH RBC QN AUTO: 30.5 PG (ref 26.5–33)
MCHC RBC AUTO-ENTMCNC: 31.4 G/DL (ref 31.5–36.5)
MCV RBC AUTO: 97 FL (ref 78–100)
MONOCYTES # BLD AUTO: 0.2 10E3/UL (ref 0–1.3)
MONOCYTES NFR BLD AUTO: 2 %
NEUTROPHILS # BLD AUTO: 10.9 10E3/UL (ref 1.6–8.3)
NEUTROPHILS NFR BLD AUTO: 93 %
NRBC # BLD AUTO: 0 10E3/UL
NRBC BLD AUTO-RTO: 0 /100
PETH INTERPRETATION: NORMAL
PLATELET # BLD AUTO: 318 10E3/UL (ref 150–450)
PLPETH BLD-MCNC: <10 NG/ML
POPETH BLD-MCNC: <10 NG/ML
POTASSIUM SERPL-SCNC: 4.3 MMOL/L (ref 3.4–5.3)
PROT SERPL-MCNC: 6.2 G/DL (ref 6.4–8.3)
RBC # BLD AUTO: 2.98 10E6/UL (ref 4.4–5.9)
SODIUM SERPL-SCNC: 135 MMOL/L (ref 135–145)
UFH PPP CHRO-ACNC: 0.31 IU/ML
UFH PPP CHRO-ACNC: 0.64 IU/ML
WBC # BLD AUTO: 11.8 10E3/UL (ref 4–11)

## 2024-09-06 PROCEDURE — 250N000013 HC RX MED GY IP 250 OP 250 PS 637

## 2024-09-06 PROCEDURE — 80053 COMPREHEN METABOLIC PANEL: CPT

## 2024-09-06 PROCEDURE — 85520 HEPARIN ASSAY: CPT

## 2024-09-06 PROCEDURE — 85520 HEPARIN ASSAY: CPT | Performed by: INTERNAL MEDICINE

## 2024-09-06 PROCEDURE — 99222 1ST HOSP IP/OBS MODERATE 55: CPT | Mod: AI | Performed by: INTERNAL MEDICINE

## 2024-09-06 PROCEDURE — 99233 SBSQ HOSP IP/OBS HIGH 50: CPT | Mod: GC | Performed by: INTERNAL MEDICINE

## 2024-09-06 PROCEDURE — 82565 ASSAY OF CREATININE: CPT | Performed by: STUDENT IN AN ORGANIZED HEALTH CARE EDUCATION/TRAINING PROGRAM

## 2024-09-06 PROCEDURE — 36415 COLL VENOUS BLD VENIPUNCTURE: CPT | Performed by: STUDENT IN AN ORGANIZED HEALTH CARE EDUCATION/TRAINING PROGRAM

## 2024-09-06 PROCEDURE — 85610 PROTHROMBIN TIME: CPT

## 2024-09-06 PROCEDURE — 85025 COMPLETE CBC W/AUTO DIFF WBC: CPT

## 2024-09-06 PROCEDURE — 36415 COLL VENOUS BLD VENIPUNCTURE: CPT

## 2024-09-06 PROCEDURE — 120N000005 HC R&B MS OVERFLOW UMMC

## 2024-09-06 PROCEDURE — 250N000011 HC RX IP 250 OP 636

## 2024-09-06 PROCEDURE — 83735 ASSAY OF MAGNESIUM: CPT | Performed by: INTERNAL MEDICINE

## 2024-09-06 RX ORDER — MULTIVIT WITH MINERALS/LUTEIN
1000 TABLET ORAL 2 TIMES DAILY
Status: ON HOLD | COMMUNITY
End: 2024-10-05

## 2024-09-06 RX ORDER — BUMETANIDE 1 MG/1
1 TABLET ORAL DAILY PRN
Status: ON HOLD | COMMUNITY
End: 2024-10-05

## 2024-09-06 RX ORDER — SENNOSIDES 8.6 MG
650-1300 CAPSULE ORAL EVERY 8 HOURS PRN
Status: ON HOLD | COMMUNITY
End: 2024-10-05

## 2024-09-06 RX ORDER — LIDOCAINE 4 G/G
1 PATCH TOPICAL
Status: DISCONTINUED | OUTPATIENT
Start: 2024-09-06 | End: 2024-09-18

## 2024-09-06 RX ADMIN — FUROSEMIDE 40 MG: 40 TABLET ORAL at 08:49

## 2024-09-06 RX ADMIN — ACETAMINOPHEN 650 MG: 325 TABLET ORAL at 22:45

## 2024-09-06 RX ADMIN — Medication 6.25 MG: at 08:54

## 2024-09-06 RX ADMIN — HEPARIN SODIUM 1150 UNITS/HR: 10000 INJECTION, SOLUTION INTRAVENOUS at 18:33

## 2024-09-06 RX ADMIN — Medication 6.25 MG: at 13:09

## 2024-09-06 RX ADMIN — ACETAMINOPHEN 650 MG: 325 TABLET ORAL at 09:03

## 2024-09-06 RX ADMIN — Medication 1 TABLET: at 08:50

## 2024-09-06 RX ADMIN — Medication 5 MG: at 00:17

## 2024-09-06 RX ADMIN — ACETAMINOPHEN 650 MG: 325 TABLET ORAL at 18:19

## 2024-09-06 RX ADMIN — MAGNESIUM OXIDE TAB 400 MG (241.3 MG ELEMENTAL MG) 400 MG: 400 (241.3 MG) TAB at 08:50

## 2024-09-06 RX ADMIN — LIDOCAINE 1 PATCH: 4 PATCH TOPICAL at 20:40

## 2024-09-06 RX ADMIN — ACETAMINOPHEN 650 MG: 325 TABLET ORAL at 00:17

## 2024-09-06 RX ADMIN — ESCITALOPRAM OXALATE 10 MG: 10 TABLET ORAL at 08:51

## 2024-09-06 RX ADMIN — Medication 1 TABLET: at 20:40

## 2024-09-06 RX ADMIN — ACETAMINOPHEN 650 MG: 325 TABLET ORAL at 13:08

## 2024-09-06 RX ADMIN — AMIODARONE HYDROCHLORIDE 200 MG: 200 TABLET ORAL at 08:51

## 2024-09-06 RX ADMIN — DIGOXIN 125 MCG: 125 TABLET ORAL at 08:52

## 2024-09-06 RX ADMIN — Medication 6.25 MG: at 18:19

## 2024-09-06 RX ADMIN — CLOPIDOGREL BISULFATE 75 MG: 75 TABLET ORAL at 08:50

## 2024-09-06 RX ADMIN — POTASSIUM CHLORIDE 20 MEQ: 750 TABLET, EXTENDED RELEASE ORAL at 08:49

## 2024-09-06 RX ADMIN — ACETAMINOPHEN 650 MG: 325 TABLET ORAL at 04:43

## 2024-09-06 RX ADMIN — ATORVASTATIN CALCIUM 80 MG: 80 TABLET, FILM COATED ORAL at 08:51

## 2024-09-06 RX ADMIN — EMPAGLIFLOZIN 10 MG: 10 TABLET, FILM COATED ORAL at 08:49

## 2024-09-06 RX ADMIN — OXYCODONE HYDROCHLORIDE AND ACETAMINOPHEN 1000 MG: 500 TABLET ORAL at 08:48

## 2024-09-06 ASSESSMENT — ACTIVITIES OF DAILY LIVING (ADL)
ADLS_ACUITY_SCORE: 24

## 2024-09-06 NOTE — PHARMACY-ADMISSION MEDICATION HISTORY
Pharmacy Intern Admission Medication History    Admission medication history is complete. The information provided in this note is only as accurate as the sources available at the time of the update.    Information Source(s): Patient, Family member, Patient's pharmacy, and CareEverywhere/SureScripts via in-person    Pertinent Information:  Jardiance - patient and wife report medication was discontinued by provider, chart review indicates medication was held for suspected UTI.  Left on PTA list and marked as not taking with note.  No Surescript fill records found in the past year for amiodarone or atorvastatin, but patient and wife report patient still taking both and has adequate supply at home.  Verified fill records with patient's pharmacy over the phone ([facility] [phone]) - 90-day supply for amiodarone filled 8/8/24 and 90-day supply for atorvastatin filled 6/7/24.    Changes made to PTA medication list:  Added: bumetanide, Tylenol.  Deleted:  Escitalopram - 90-day supply last filled 1/2/24; patient reports taking as needed for anxiety, with only 2-3 doses taken total.  Removed from PTA list for now.  Ibuprofen - patient reports taking Tylenol as needed for pain, not ibuprofen.  Removed from PTA list.  Changed: None    Allergies reviewed with patient and updates made in EHR: no    Medication History Completed By: Ruby Pompa 9/6/2024 3:17 PM    PTA Med List   Medication Sig Last Dose    acetaminophen (TYLENOL 8 HOUR ARTHRITIS PAIN) 650 MG CR tablet Take 650-1,300 mg by mouth every 8 hours as needed for pain. PRN    amiodarone (PACERONE) 200 MG tablet Take 1 tablet (200 mg) by mouth daily 8/30/2024    atorvastatin (LIPITOR) 80 MG tablet Take 1 tablet (80 mg) by mouth daily 8/30/2024    bumetanide (BUMEX) 1 MG tablet Take 1 mg by mouth daily as needed (for weight increase or leg swelling). PRN    cetirizine (ZYRTEC) 10 MG tablet Take 10 mg by mouth daily 8/30/2024    clopidogrel (PLAVIX) 75  MG tablet Take 1 tablet (75 mg) by mouth daily 8/30/2024    FISH OIL-VITAMIN D PO Take 1 capsule by mouth 2 times daily. 8/30/2024    lisinopril (ZESTRIL) 2.5 MG tablet Take 1 tablet (2.5 mg) by mouth daily HOLD for systolic blood pressure < 90 8/30/2024    magnesium oxide (MAG-OX) 400 MG tablet Take 1 tablet (400 mg) by mouth daily 8/30/2024    melatonin 5 MG tablet Take 1-2 tablets (5-10 mg) by mouth at bedtime 8/29/2024    metoprolol succinate ER (TOPROL XL) 25 MG 24 hr tablet Take 0.5 tablets (12.5 mg) by mouth daily Hold for systolic blood pressure less than 90. 8/29/2024    multivitamin w/minerals (THERA-VIT-M) tablet Take 1 tablet by mouth 2 times daily 8/30/2024    potassium chloride tray ER (KLOR-CON M20) 20 MEQ CR tablet TAKE ONE TABLET BY MOUTH ONCE DAILY 8/29/2024    vitamin C (ASCORBIC ACID) 1000 MG TABS Take 1,000 mg by mouth 2 times daily. 8/30/2024

## 2024-09-06 NOTE — PROGRESS NOTES
"Dental Consult: Dental Clearance for VAD Planning     Referring MD & Reason for Visit: [facility] was asked by Luther Sloan MD, to see Duane C Johnson for a COMPREHENSIVE oral assessment regarding Heart failure pre Ventricular Assist Device (VAD) planning, dental clearance for cardiac surgery      HPI:  This patient is a 74 year old male with a history of anterior STEMI s/p PCI to the pLAD on 10/26/23 with a VT/VF arrest the next day (10/27) with patent stents and unchanged anatomy on repeat angiogram. Placed on amiodarone and discharged 11/03/23, ICD was not indicated as this was within 48h of his MI. His EF prior to discharge was 20-30% with a large area of akinesis in the LAD, placed on apixaban due to this (Apixaban dcd on 7/5/24). Has had multiple admissions for HF exacerbation last year.  Admitted on 8/30/24. He presents for 2 weeks of worsening fatigue; found to have BNP of 16k and L pleural effusion. Admitted for diuresis and RHC.     Dental History and Findings:  Dental and oropharyngeal history is pertinent for ongoing VAD workup, requiring dental clearance - dental CT completed 9/3; pt does not have an established dental home - pt unable to recall when last dental visit was; social hx significant for previous tobacco smoking, alcohol use.  The patient reports no current oral/dental pain or concerns. Pt endorses occasional intraoral swelling, scattered t/o the dentition - when this occurs pt states, \"I take a sharp X-acto knife and take care of it myself.\" Pt expresses interest in having remaining dentition extracted and eventually replace them with dental implants.     Xerostomia with rampant dental caries and periodontal disease    EXAM: CT DENTAL WO CONTRAST 9/3/2024 6:06 PM     HISTORY:  Heart failure pre Ventricular Assist Device (VAD) planning,  dental clearance for cardiac surgery.     TECHNIQUE: 3-D reconstruction by the technologists, with curved  multiplanar reformat of thin " section imaging through the mandible and  maxilla obtained without intravenous contrast.     FINDINGS:  No significant soft tissue swelling or mass. Normal facial bone  alignment. Extensive dental caries and periapical lucencies involving  essentially all dentition. Multiple missing and broken teeth.     Chronic circumferential maxillary osseous wall thickening bilaterally.  Chronic opacification of the left maxillary sinus including erosion of  the medial wall. Normal temporomandibular joints.                                                                      IMPRESSION:   1. Extensive dental caries and periapical lucencies involving  essentially all dentition.  2. Chronic maxillary sinusitis.    Dental Service Review of Dental CT:   Poor dentition noted with no teeth restorable. 19-20 remaining teeth mostly just root tips remain with multiple periapical findings on CT.    Assessment:  Poor dentition - remaining dentition with severe caries, retained root tips; CT confirms generalized PARLs, dental caries - pt denies current pain, no swelling or drainage present; poor oral hygiene, suspected chronic periodontitis, dry mouth (which pt denies).      Dental Service Recommendations:  Total odontectomy extract the remaining 19-20 teeth in the Hospital dental clinic or at a clinic of his choosing. This will take multiple appointments.  Consider chlorhexidine oral rinse twice daily until the VAD surgery is done.  Continue to follow the dental hygiene oral care plan.    Maxwell Jean DDS  Staff Attending Dentist

## 2024-09-06 NOTE — PROGRESS NOTES
CLINICAL NUTRITION SERVICES       Nutrition Prescription    RECOMMENDATIONS FOR MDs/PROVIDERS TO ORDER:  -See prior RD notes.  -If pt consuming less than 1050 kcals and 45 g protein daily on average, rec TFs pre-op. See TF recs in nutrition assessment note 9/4 for all details.     Malnutrition Status:    Severe malnutrition    Recommendations already ordered by Registered Dietitian (RD):  Modifying oral supplements, added snack    Future/Additional Recommendations:  See prior RD notes     Updating malnutrition and checking nutrition questions, LVAD pre-op nutrition education.    Pt seen on second attempt today after ~10 failed prior attempts to see.     Nutrition hx: Pt states over the past 1.5 years, he has been eating differently (along with his wife) to be healthier. He is the  in the family and watches YouTube videos. States he weighed 220# and lost wt; ~145# is his usual wt by eating healthier. Typically has two scoops (one strawberry, one chocolate) in ~2 cups of milk as a shake in the morning. He skips lunch, and then cooks a larger meal at supper. He cooks his supper a little longer to be softer as pt states he has no teeth.     MALNUTRITION  % Intake: </=75% for >/= 1 month (severe)   % Weight Loss: Weight loss does not meet criteria -Per previous nutrition note.   Subcutaneous Fat Loss: Facial region: Moderate, Arms: Severe  Muscle Loss: Temporal:  Severe, Thoracic region (clavicle, acromium bone, deltoid, trapezius, pectoral):  Moderate, and Dorsal hand:  Mild  Fluid Accumulation/Edema: None noted -Per previous nutrition note.   Malnutrition Diagnosis: Severe malnutrition in the context of chronic illness    INTERVENTIONS:  Implementation:  -Nutrition education for nutrition relationship to health/disease: Discussed nutrition education for potential upcoming LVAD surgery (pt in the LVAD workup process, now scheduled for 9/11). Discussed the importance of adequate oral intake now and post-op.  "Emphasized protein intake and gave examples of high protein foods/beverages. Discussed and encouraged oral supplements post-op. Explained rec to avoid excessive salt/sodium. Pt seemed agreeable to nutrition plan if LVAD is placed in the future. In the short-term and long-term, rec pt try to eat more food (especially lunch or a snack for lunch if able). Rec goal is for weight gain. Commended him on his choices for breakfast (yogurt, Beneprotein, and Ensure Enlive) along with an omelet.  -Showed level 7 menu (left in room earlier) and explained these options are easier to chew.   -Pt mentioned he may want the same meals daily to be scheduled. Modified room service order.   -Modified oral supplements: Ensure Enlive @ 10 and 2, strawberry (preferred) or chocolate. Provided oral supplement list earlier this admission.   -Ordered HS snack, chocolate ice cream.     Monitoring/Evaluation  Progress toward goals will be monitored and evaluated per protocol.     Nutrition will continue to follow.      Opal Esposito, MS, RD, LD, CNSC      No longer available via pager  6C (beds [id number] and [id number]): Vocera \"6C Clinical Dietitian\"   Weekend/Holiday: Vocera \"Weekend Clinical Dietitian\"     "

## 2024-09-06 NOTE — PROGRESS NOTES
[facility]  Cardiology II Service / Advanced Heart Failure  Daily Progress Note    Patient: Duane C Johnson      : 1950      MRN: [id number]    Assessment/Plan:   Duane C Johnson is a 74 year old male pmhx of anterior STEMI s/p PCI to the pLAD on 10/26/23 with a VT/VF arrest the next day (10/27) with patent stents and unchanged anatomy on repeat angiogram. Placed on amiodarone and discharged 23, ICD was not indicated as this was within 48h of his MI. His EF prior to discharge was 20-30% with a large area of akinesis in the LAD, placed on apixaban due to this (Apixaban dcd on 24). Has had multiple admissions for HF exacerbation last year.  Admitted on 24. He presents for 2 weeks of worsening fatigue; found to have BNP of 16k and L pleural effusion. Admitted for diuresis and RHC.     Today's changes:   - Start Lasix 40 mg daily, trial this as maintenance dose   - Continuing Captopril at 6.25 TID. Tolerating this dose without dizziness   - Continue Digoxin 250mcg   - Ongoing evaluation for LVAD   > Awaiting dental eval   > Optimizing nutritional status   > cMRI likely next week to definitively rule out thrombus   - Neuropsych inpatient eval   - Discontinued metoprolol due to low cardiac output    # Acute heart failure exacerbation  #Possible ambulatory shock  # HFrEf 2/2 ICM (EF 15-20% by TTE 24)  Fluid status: Euvolemic  ACEi/ARB/ARNi/afterload reduction: Captopril 6.25 mg TID, hold for SBP < 90  BB: Hold metoprolol due to reduced CO and borderline BP  Aldosterone antagonist: unable to start d/t hypotension   SGLT2i: Empagliflozin 10mg  SCD prophylaxis: s/p ICD 2024  NSAID use: contraindicated  Antiarrhythmic: Amiodarone 200mg   Diuretic: Started 40 mg daily Lasix for maintenance on     Echo from  with EF 15-20% and trace MR. See below for full report.    RHC from 9/3 showing normal R sided pressures, mildly increased L sided pressures,  reduced CO. See below for full report.  BP as low as 84/62 (MAP 69), has remained asymptomatic other than fatigue and mild SOB.    - BNP of 16k on admission with L sided pleural effusion   - Continuing to hold Metoprolol, Jardiance was restarted   - Lasix 40 mg daily for maintenance, started 9/5   - Low intensity heparin gtt     - Continue Captopril 6.25 TID    - Continue Digoxin 250mcg    - cMRI as below for LVAD eval    #AFib  # h/o VT/VF arrest in 2023  # ICD placed on 5/8/24  Was on apixaban for low EF (akinesis of the mid-distal anterior,mid anteroseptum, distal septum and the apex) and questionable history of Afib. Since there was no episode of Afib captured apixaban was stopped on 7/5/24. Device interrogation showing AF episode started on 8/28/24. Converted to A-paced rhythm after cardioversion on 9/5.   - PTA Amio 200, Magnesium 400, Potassium 20  - Holding PTA Metoprolol   - Mag and Potassium replacement per nursing protocol  - Had successful cardioversion on 9/5    #LVAD evaluation   Pt has received financial clearance and LVAD evaluation is proceeding.  - Dental evaluation shows carries in all remaining teeth. Will need extraction   > Awaiting dental eval  - Urology consulted for history of prostate cancer. Has completed therapy and last PSA was undetectable. This is not an obstacle to LVAD  - Abd US without evidence of chronic liver disease.   - No significant carotid stenosis   - ABIs normal   - Inpatient neuropsych eval requested (not absolutely necessary)   - Optimizing nutritional status   - cMRI to definitively rule out thrombus given recent afib    # CAD s/p PCI to LAD  # STEMI  Anterior STEMI on 10/26/23 with a VT/VF arrest the next day. Repeat angio showed patent stents and unchanged anatomy.   - PTA plavix, Atorvastatin 80    #DOMENICA  Baseline Cr normal at ~1. Cr of 1.55 on 8/24 with this acute episode of HF. Concern for type 1 cardiorenal syndrome. uPCR negative. Improving.    - Diuresis as  above  - avoid nephrotoxins    # Moderate malnutrition  - Nutrition consulted to help manage  - Encourage high protein drinks     ================================  Chronic Problems:    #Prostate Cancer  Diagnosed in 2023. Underwent Leuprolide on 3/19/24 and 24. Completed radiation treatment on 24   - Consider testosterone level as outpatient to assess for recovery after ADT    #L inguinal hernia  Pt reports this has been present for some time. Not causing any pain. Not a surgical candidate with current HF status so will not consult surgery at this time.     Hospital Checklist:  DVT Prophylaxis: Heparin gtt  Code Status: Full Code  Bowel regimen: PRN  Diet/Nutrition: 2L fluid and 2G sodium restriction.   Lines: Left PIV    Disposition Plannin+ days for LVAD evaluation    Staffed w/ Dr. Cruz.    Danial Fofana MD  PGY3, Internal Medicine  Cardiology 2 Service     2024  ==================================    Subjective:   No acute events. Felt well this AM overall but has ongoing back pain due to his bed. He uses a different mattress at home and as a result has not been sleeping well here. Working on improving his diet with nutrition.     Objective:     Vitals:    24 0511 24 0400 24 0443   Weight: 61.8 kg (136 lb 3.2 oz) 61.9 kg (136 lb 8 oz) 61.7 kg (136 lb 1.6 oz)     Vital Signs with Ranges  Temp:  [97.6  F (36.4  C)-99  F (37.2  C)] 98.4  F (36.9  C)  Pulse:  [60-77] 74  Resp:  [7-26] 16  BP: ()/(47-72) 113/68  SpO2:  [96 %-100 %] 96 %  I/O last 3 completed shifts:  In: 439.78 [P.O.:170; I.V.:269.78]  Out: 550 [Urine:550]    Exam:  General: No acute distress  HEENT: Normocephalic, atraumatic  CV: RRR, no murmurs   Lungs: On RA, no crackles. No wheezes, no increased WOB  Abd: Soft, non-tender, non-distended  Ext: Warm and well-perfused, no lower extremity edema  Neuro: Awake, alert, conversational, moving all extremities spontaneously throughout  examination    Medications   Current Facility-Administered Medications   Medication Dose Route Frequency Provider Last Rate Last Admin    heparin 25,000 units in 0.45% NaCl 250 mL ANTICOAGULANT infusion  0-5,000 Units/hr Intravenous Continuous Cristobal Fisher MD 13.5 mL/hr at 09/06/24 0400 1,350 Units/hr at 09/06/24 0400    May take oral meds with a sip of water, the morning of Cardioversion procedure.       Does not apply Continuous PRN Vladimir Johnson MD        sodium chloride 0.9 % infusion  1,000 mL Intravenous Continuous Siddhartha'Narciso Navarro  mL/hr at 09/05/24 0835 400 mL at 09/05/24 0835    sodium chloride 0.9 % infusion   Intravenous Continuous Hawa Keita PA-C         Current Facility-Administered Medications   Medication Dose Route Frequency Provider Last Rate Last Admin    amiodarone (PACERONE) tablet 200 mg  200 mg Oral Daily Cristobal Fisher MD   200 mg at 09/05/24 0800    atorvastatin (LIPITOR) tablet 80 mg  80 mg Oral Daily Cristobal Fisher MD   80 mg at 09/05/24 0801    captopril (CAPOTEN) half-tab 6.25 mg  6.25 mg Oral TID AC Cristobal Fisher MD   6.25 mg at 09/05/24 1732    clopidogrel (PLAVIX) tablet 75 mg  75 mg Oral Daily Cristobal Fisher MD   75 mg at 09/05/24 0800    digoxin (LANOXIN) tablet 125 mcg  125 mcg Oral Daily Danial Fofana MD        empagliflozin (JARDIANCE) tablet 10 mg  10 mg Oral Daily Cristobal Fisher MD   10 mg at 09/05/24 0801    escitalopram (LEXAPRO) tablet 10 mg  10 mg Oral Daily Cristobal Fisher MD   10 mg at 09/05/24 0800    furosemide (LASIX) tablet 40 mg  40 mg Oral Daily Danial Fofana MD   40 mg at 09/05/24 1330    magnesium oxide (MAG-OX) tablet 400 mg  400 mg Oral Daily Cristobal Fisher MD   400 mg at 09/05/24 0801    multivitamin w/minerals (THERA-VIT-M) tablet 1 tablet  1 tablet Oral BID Cristobal Fisher MD   1 tablet at 09/05/24 2027    potassium chloride tray ER (KLOR-CON M10) CR tablet 20 mEq  20 mEq Oral Daily Cristobal Fisher MD   20 mEq at 09/05/24 0806     sodium chloride (PF) 0.9% PF flush 3 mL  3 mL Intravenous Q8H Hawa Keita PA-C        sodium chloride (PF) 0.9% PF flush 3 mL  3 mL Intravenous Q8H Vladimir Johnson MD        vitamin C (ASCORBIC ACID) tablet 1,000 mg  1,000 mg Oral Daily Cristobal Schmidt MD   1,000 mg at 24 1330       Data   Recent Labs   Lab 24  0548 24  1047 24  0422 24  0626   WBC 11.8*  --  13.4* 12.7*   HGB 9.1*  --  9.6* 9.1*   MCV 97  --  96 96     --  318 329   INR 1.30*  --  1.35* 1.31*     --  133* 134*   POTASSIUM 4.3 4.5 4.4 4.4   CHLORIDE 103  --  101 103   CO2 23  --  24 19*   BUN 31.2*  --  42.1* 41.1*   CR 1.11  --  1.25* 1.35*   ANIONGAP 9  --  8 12   PRANAV 8.4*  --  8.2* 8.4*   *  --  111* 95   ALBUMIN 2.7*  --  2.7* 2.6*   PROTTOTAL 6.2*  --  6.0* 6.2*   BILITOTAL 0.3  --  0.3 0.4   ALKPHOS 84  --  132 91   ALT 17  --  22 22   AST 22  --  27 32     RHC 9/3/24    RA:   RV:39/9  PA:  PCWP:16/22/15    Pa Sat:48.2%  Goldy; CO/CI: 2.91/1.71  Thermodilution; CO/CI:3.53/2.08   Right sided filling pressures are normal.   Left sided filling pressures are mildly elevated. Mild elevated pulmonary hypertension.   Reduced cardiac output level.         Echocardiogram Complete   Result Value    LVEF  15-20% (severely reduced)    [facility]    [id number]  [id number]  [id number]  [id number]^ROXANA^CRISTOBAL     [facility],[place]  Echocardiography Laboratory  [address] [place] [id number]     Name: JOHNSON, DUANE C  MRN: [id number]  : 1950  Study Date: 2024 08:20 AM  Age: 74 yrs  Gender: Male  Patient Location: [facility]  Reason For Study: CHF  Ordering Physician: CRISTOBAL SCHMIDT  Performed By: Yessenia Cano RDCS     BSA: 1.7 m2  Height: 66 in  Weight: 145 lb  HR: 71  BP: 94/73 mmHg  ______________________________________________________________________________  Procedure  Complete Portable Echo Adult. Contrast Optison. Optison (NDC  #1606-4991-97)  given intravenously. Patient was given 6 ml mixture of 3 ml Optison and 6 ml  saline. 3 ml wasted.  ______________________________________________________________________________  Interpretation Summary  Left ventricular function is decreased. The ejection fraction is 15-20%  (severely reduced).  Moderate left ventricular dilation is present.  Apical wall akinesis is present.  Severe diffuse hypokinesis is present.  There is no thrombus seen in the left ventricle.  The right ventricle is normal size.  Global right ventricular function is normal.  Mild functional mitral insufficiency is present.  Mild to moderate tricuspid functional insufficiency is present.  Pulmonary hypertension is present.The right ventricular systolic pressure is  40mmHg above the right atrial pressure.  IVC diameter and respiratory changes fall into an intermediate range  suggesting an RA pressure of 8 mmHg.     This study was compared with the study from 2/19/2024 .MR, TR improved. LVEF  similar in both studies compared side to side. So overall no change in LVEF  ______________________________________________________________________________  Left Ventricle  Left ventricular wall thickness is normal. Moderate left ventricular dilation  is present. Left ventricular diastolic function is not assessable. Left  ventricular function is decreased. The ejection fraction is 15-20% (severely  reduced). Apical wall akinesis is present. Severe diffuse hypokinesis is  present. There is no thrombus seen in the left ventricle.     Right Ventricle  The right ventricle is normal size. Global right ventricular function is  normal. A pacemaker lead is noted in the right ventricle.     Atria  Moderate left atrial enlargement is present. Moderate right atrial enlargement  is present.     Mitral Valve  Mild mitral insufficiency is present.     Aortic Valve  Trileaflet aortic sclerosis without stenosis. On Doppler interrogation, there  is no  significant stenosis or regurgitation.     Tricuspid Valve  Mild to moderate tricuspid insufficiency is present. The right ventricular  systolic pressure is approximated at 39.7 mmHg plus the right atrial pressure.  Pulmonary hypertension is present. The right ventricular systolic pressure is  40mmHg above the right atrial pressure.     Pulmonic Valve  On Doppler interrogation, there is no significant stenosis or regurgitation.     Vessels  The aorta root is normal. IVC diameter and respiratory changes fall into an  intermediate range suggesting an RA pressure of 8 mmHg.     Pericardium  No pericardial effusion is present.     Compared to Previous Study  This study was compared with the study from 2024 . MR, TR improved. LVEF  similar in both studies compared side to side. So overall no change in LVEF.  ______________________________________________________________________________  MMode/2D Measurements & Calculations     IVSd: 0.89 cm  LVIDd: 6.0 cm  LVIDs: 5.5 cm  LVPWd: 0.79 cm  FS: 7.1 %  LV mass(C)d: 197.0 grams  LV mass(C)dI: 112.9 grams/m2  LVOT diam: 2.0 cm  LVOT area: 3.2 cm2  EF Biplane: 16.8 %  LA Volume (BP): 71.3 ml  LA Volume Index (BP): 41.0 ml/m2  RV Base: 4.6 cm  RWT: 0.27     TAPSE: 1.0 cm     Doppler Measurements & Calculations  MV E max ivan: 82.7 cm/sec  LV V1 max P.9 mmHg  LV V1 max: 84.2 cm/sec  LV V1 VTI: 13.8 cm  MR PISA: 4.2 cm2  MR ERO: 0.33 cm2  MR volume: 35.7 ml  SV(LVOT): 43.8 ml  SI(LVOT): 25.1 ml/m2  PA acc time: 0.08 sec  TR max ivan: 315.1 cm/sec  TR max P.7 mmHg  RV S Ivan: 10.7 cm/sec     ______________________________________________________________________________  Report approved by: Jeanine MEJIA 2024 11:05 AM            Examination: XR CHEST 2 VIEWS 2024 3:06 PM     Indication: Shortness of breath     Comparison: Radiograph 2024. CT 2023.     Findings:  PA and lateral views of the chest. Left chest wall implantable cardiac  defibrillator  with grossly intact leads/shock coil. Coronary stenting.  Trachea is midline. Stable mild cardiomegaly. Mild blunting of the  left costophrenic angle, likely representing small pleural effusion.  No pneumothorax. No focal consolidation or any definite abnormal  airspace opacities. No acute or suspicious osseous abnormality.  Unremarkable visualized abdomen.      Impression   Impression:   1. Stable mild cardiomegaly with implantable cardiac defibrillator and  coronary stenting.  2. Small left pleural effusion.     I have personally reviewed the examination and initial interpretation  and I agree with the findings.     PANCHITO EID MD         SYSTEM ID:  [id number]      12/6/2023 CMR  Clinical history: 73 year old with anterior STEMI, cardiac arrest in Oct 2023  Comparison CMR: none  1. The left ventricle is severely enlarged. There is wall thinning and akinesis of the mid-distal anterior,  mid anteroseptum, distal septum and the apex  The global systolic function is severely reduced. The LVEF is 28%.  2. The right ventricle is normal in cavity size. The global systolic function is normal. The RVEF is 52%.   3. The right atrium is normal and the left atrium is severely enlarged.  4. There is mild mitral regurgitation.   5. There is transmural late gadolinium enhancement in mid-distal anterior, mid anteroseptum, distal  septum and the apex consistent with a large infarction in the LAD territory.   6. There is no pericardial effusion.  7. There is no intracardiac thrombus.  8. There are bilateral pleural effusions.  CONCLUSIONS:   Ischemic cardiomyopathy with a large anteroapical infarction.   Severely reduced left ventricular function and normal right ventricular function, LVEF 28% and RVEF 52%.     Cardiac Catheterization:  10/26/23    1st Mrg lesion is 20% stenosed.    Prox LAD to Mid LAD lesion is 100% stenosed.    1st Diag-1 lesion is 80% stenosed.    1st Diag-2 lesion is 50% stenosed.    Dist LAD lesion is  100% stenosed.    RPDA lesion is 70% stenosed.    Dist RCA lesion is 40% stenosed.     Anterior STEMI  Two vessel obstructive CAD (100% pLAD occlusion, 70% rPDA stenosis, 80% diagonal 1 stenosis)  PCI of pLAD to mLAD with BRENDA x 1 (Synergy 2.50x 28 mm) post dilated to 2.75 vessel  CVC placement in RIJ  LVEDP of 26 mmHg  Hemostasis of RRA with TR band      2/19/2024 Echo  Interpretation Summary     1. The left ventricle is moderately dilated. Left ventricular hypertrophy is  noted by two-dimensional echocardiography. Proximal septal thickening is  noted. Left ventricular systolic function is moderate to severely reduced. The  visual ejection fraction is 25-30%. Biplane LVEF is 25%. Diastolic Doppler  findings (E/E' ratio and/or other parameters) suggest left ventricular filling  pressures are increased. There is severe hypokinesis to akinesis of the mid  anterior/anterolateral/anteroseptal/inferoseptal walls and all apical segments  of the left ventricle. There is no thrombus seen in the left ventricle.  2. The right ventricle is normal size. The right ventricular systolic function  is normal.  3. The left atrium is moderately dilated. Right atrial size is normal. There  is no color Doppler evidence of an atrial shunt.  4. There is moderate to mod-severe (2-3+) mitral regurgitation.  5. There is mild to moderate (1-2+) tricuspid regurgitation.Right ventricular  systolic pressure is elevated, consistent with moderate pulmonary  hypertension.  6. No pericardial effusion.  7. In direct comparison to the previous study dated 10/30/2023, there has been  an interval increase in the degree of mitral regurgitation. The other findings  are similar.     RHC 5/6/24  RA: 10/9/6 mmHg  RV: 61/11 mmHg  PA: 63/24/38 mmHg  PCWP: Unable to obtain accurate measurement  CO/CI (Goldy): 3.89/2.3 L/min/m2    PA sat: 64%  MAP: 85 mmHg       Right sided filling pressures are normal.    Mild elevated pulmonary hypertension.    Normal cardiac  output level.        Device interrogated on 8/30/24  Device: [employer] D533 RESONATE HF ICD  Normal device function.  Mode: DDDR  bpm  AP: 33%  : 3%  Intrinsic rhythm: Afib w/ VS  bpm  Thoracic Impedance: Below reference line, suggests possible intrathoracic fluid accumulation.  Lead Trends Appear Stable.  Estimated battery longevity to RRT = 12.5 years.    Atrial Arrhythmia: AF episode began on 8/28/24  AF Disney: 11%  Anticoagulant: Eliquis  Ventricular Arrhythmia: None  Setting Changes: Rate response turned ON in device, pacing mode changed to DDDR from DDD, fallback rate during AT/AF episodes 70 bpm.

## 2024-09-06 NOTE — PLAN OF CARE
"[id number]    /68 (BP Location: Left arm, Cuff Size: Adult Small)   Pulse 74   Temp 98.4  F (36.9  C) (Oral)   Resp 16   Ht 1.676 m (5' 5.98\")   Wt 61.7 kg (136 lb 1.6 oz)   SpO2 96%   BMI 21.98 kg/m       Goal Outcome Evaluation:     Plan of Care Reviewed With: patient    Overall Patient Progress: no change  Problem: Risk for Delirium  Goal: Optimal Coping  Intervention: Optimize Psychosocial Adjustment to Delirium   active listening utilized   self-care encouraged     Problem: Risk for Delirium  Goal: Improved Sleep  Outcome: Progressing     Problem: Heart Failure  Goal: Optimal Coping  Outcome: Progressing  Intervention: Support Psychosocial Response   active listening utilized   self-care encouraged     Problem: Heart Failure  Goal: Fluid and Electrolyte Balance  Outcome: Progressing      Neuro: A&Ox4.   Cardiac: Afebrile, VSS.   Respiratory: RA   GI/: Voiding spontaneously. No BM this shift.   Diet/appetite: Tolerating 2g Na+ diet. Denies nausea   Activity: Up with assist x1  Pain: chronic back pain 3-4/10 Tylenol Q4h PRN  Skin: No new deficits noted.  Lines: 1 PIV infusing hep @1350 units/hour  Drains: None  Replacement: None    LVAD Training scheduled for today       "

## 2024-09-06 NOTE — ANESTHESIA POSTPROCEDURE EVALUATION
Patient: Duane C Johnson    Procedure: Procedure(s):  Anesthesia cardioversion       Anesthesia Type:  General    Note:  Disposition: Inpatient   Postop Pain Control: Uneventful            Sign Out: Well controlled pain   PONV: No   Neuro/Psych: Uneventful            Sign Out: Acceptable/Baseline neuro status   Airway/Respiratory: Uneventful            Sign Out: Acceptable/Baseline resp. status   CV/Hemodynamics: Uneventful            Sign Out: Acceptable CV status; No obvious hypovolemia; No obvious fluid overload   Other NRE: NONE   DID A NON-ROUTINE EVENT OCCUR? No           Last vitals:  Vitals:    09/05/24 2028 09/06/24 0007 09/06/24 0733   BP: 94/66 113/68 116/73   Pulse: 74 74 63   Resp: 16 16    Temp: 36.9  C (98.5  F) 36.9  C (98.4  F) 36.7  C (98  F)   SpO2: 97% 96% 98%       Electronically Signed By: Marcelo Morales MD  September 6, 2024  11:08 AM

## 2024-09-06 NOTE — PROGRESS NOTES
"Met with patient and wife to present the HM3 as a possible treatment option.      Discussed patient and caregiver responsibilities before and after VAD implant. Clarified that only 2 caregivers may be trained. Clarified the need for a caregiver to be present for education and to assist patient for at least first 30 days after a patient returns home.  Patient's designated caregiver is wife, Melissa .     Discussed basic overview of VAD equipment, on going management, need for anticoagulation, regular dressing change, grounded three-pronged outlet and functioning telephone. Also discussed frequency of follow-up clinic appointments and the need to stay locally for at least 2-4 weeks.  Reviewed restrictions of having a VAD such as no swimming, bathing, boats, MRI's, or arc welding.     Provided and discussed the VAD educational brochure, information regarding the VAD and transplant support groups, discussed the 4/2024 EOGO recall, and \"VAD What You Should Know\" with additional details. Patient and wife signed \"VAD What You Should Know\" document (or agreed to have VAD Coordinator sign on their behalf). VAD coordinator contact information provided.  Encouraged patient and wife to call with questions. Learners verbalized understanding of the above information.    "

## 2024-09-06 NOTE — CONSULTS
"  Palliative Care Consultation Note  [facility]      Patient: Duane C Johnson  Date of Admission:  8/30/2024    Requesting Clinician / Team: Prasanth 2  Reason for consult: LVAD assessment       Recommendations & Counseling     No concerns with proceeding with LVAD implantation at this time.  Patient had specific questions about machinery and tools that he may or may not be able to use with an LVAD in place, these were passed on to primary team for further discussion.    GOALS OF CARE:   Life-prolonging  See below under \"assessment\" for detailed answers to LVAD questionnaire    ADVANCE CARE PLANNING:  No health care directive on file. Per system policy, Surrogate Decision-makers for Patients With Diminished Decision-making Capacity offers guidance on possible decision-makers.  Patient's wife Melissa has been identified as a surrogate decision maker.  Patient notes that he is working on a healthcare directive, encouraged patient to complete this and have it notarized at the hospital.  There is no POLST form on file, defer to patient and/or next of kin for decisions   Code status: Full Code    MEDICAL MANAGEMENT:   We are not actively managing symptoms at this time.    PSYCHOSOCIAL/SPIRITUAL SUPPORT:  Family : Spouse Melissa, patient identifies extended family including a nephew that would be able to provide assistance and support to he and his spouse.    Palliative Care will sign off after today's visit. Thank you for the consult and allowing us to aid in the care of Duane C Johnson.    These recommendations have been discussed with primary team via Rhett franz.    Alexa Kumari MD  [facility], Palliative Care  Securely message with the Sp[employer] Web Console (learn more here) or  Text page via [facility] Paging/Directory         Assessment      Duane C Johnson is a 74 year old male with a past medical history that includes anterior STEMI status post PCI in October 2023 with " subsequent VT/VF arrest the following day, ultimately stabilized and discharged in November 2023 with EF prior to discharge at 20-30% with large area of akinesis in the LAD region.  Patient has had several readmissions for heart failure exacerbations over the last year, currently admitted with a couple weeks of worsening fatigue and found to have acute heart failure exacerbation, possible ambulatory shock and HFrEF secondary to ischemic cardiomyopathy with EF 15% by MELBA 9/5/2024.  Patient currently being evaluated for advanced cardiac therapies including LVAD.  Patient was seen today by palliative care as part of LVAD assessment.    Today, the patient was seen for:  Acute heart failure exacerbation  HFrEF due to ischemic cardiomyopathy (EF 15 to 20%)  Possible ambulatory shock  Consideration of advanced cardiac therapies  Goals of care  Advance care planning  Support with coping    Heart transplantation and/or LVAD preparedness plan    Patient's legally designated health care agent: Melissa Aguila (spouse)      Patient's description of how they make decisions (by themselves, in consultation with certain close loved ones, based on advice from medical professionals, etc): In consultation with spouse Melissa      Patient's thoughts about what constitutes a 'good' quality of life/what makes life worth living: Duane very much enjoys being busy, both mentally busy with things that he finds to be challenging puzzles to solve as well as with hobbies that require fabrication/repair/restoration work on things like cars, dwellings.  Patient very much values his cognition and current mentation and state of mind, and he would not find a baseline mental status that is far from his current mental status to be acceptable. Duane loves learning new things and notes that he reads up on lots of different topics simply for the curiosity of wanting to know more about them.      Patient's personal goals/hopes for receiving the LVAD and/or  "transplant and how they anticipate future with LVAD and/or transplant to be like: Patient is hoping to be able to have more time to continue doing activities he enjoys.  He is hopeful that he is able to do what he calls \"light duty work\" such as using a riding mower or a tractor to do work on his property, and that he is able to continue doing work on cars and a motorcycle that he owns, and continues to be active in an online science group that he is a part of that he describes as working on [employer]-inspired science questions/puzzles.      Patient's worries/concerns when considering receiving the LVAD and/or transplant: Patient did not express specific worries for me today about receiving LVAD, but more had questions about what types of machinery and building activities he may need to stop doing.  These questions were forwarded to his cardiology team.      Patient's thoughts about what health conditions would be unacceptable (situations the patient would want her/his doctors and loved ones to know that she/he would not want life prolonged)? Duane noted that if he were in a \"vegetative state\", or a state where he were not wakeful and able to interact with loved ones, that this would not be an acceptable quality of life for him.  He would not find a life where he were confined to a bed and bed ridden continuously for the rest of his life to be an acceptable quality of life for him.  He is not sure if he would be okay with living for the rest of his life in a care facility, but would be okay with a care facility short-term.      Serious surgeries like LVAD and/or Heart transplantation carries a small risk of perioperative stroke/brain injury, which for some people, may limit their ability to communicate their wishes to others.  After a stroke, what sort of functional outcomes would be acceptable vs unacceptable to the patient?  For Duane, his current cognition and mental status is very important to him, he enjoys " challenging his mind with puzzles and problems to solve.  He notes that if he were not near his current baseline cognition and mental status, that this would not be an unacceptable quality of life for him. He is not sure if he would be okay with living for the rest of his life in a care facility, but he would be okay with a TCU and/or care facility short-term.       The need to be on a ventilator/breathing machine through a tracheostomy (a tube in your neck) is a risk. What are with circumstances you would want your medical providers and family to keep you alive with long term mechanical ventilation? Duane thinks he would be okay with tracheostomy and ventilation by tracheostomy for a short time, and discharge to LTACH, if it were a step toward clinical improvement and eventual ability to wean from ventilator and decannulation from tracheostomy.  He is not sure that ventilation by tracheostomy would be okay if it were for the rest of his life.      There are risks for kidney failure in patients with advancing heart disease who need LVAD support.  The places/locations that offer dialysis and accept patients with LVADs may be limited in your community.  What do you know about dialysis? Would you be open to doing dialysis and if so what quality of life concerns would you have? Duane really does not like the idea of having to go to a dialysis center 3 times a week to complete hemodialysis, and notes that he would be okay with indefinite hemodialysis if he were able to set this up at home with a private-pay home dialysis set up (we discussed that this may be unlikely).  Duane would be okay with short-term dialysis.      LVAD's are sometimes placed with the possibility of later pursuing heart transplant. Some of our patients are determined not to be heart transplant candidates, or choose to forego heart transplant surgery for personal reasons.  If you had an LVAD placed inside of you for the remainder of your life, would  that be acceptable to you and what would be your concerns?  Duane did not express any concerns with living the remainder of his life with an LVAD in place.      What circumstances or events would lead you to decide or ask your health care agent to decide that you would want the LVAD turned off and be allowed to die a natural death?  Duane notes that he would want his LVAD turned off if he were in a vegetative state, or in a state where he were permanently not wakeful and unable to interact with loved ones, or if he were bed ridden continuously for the remainder of his life.      History of Present Illness   Met with patient bedside.  He did not report any uncontrolled symptoms at time of my visit.   I introduced our role as an extra layer of support and how we help patients and families dealing with serious, potentially life-limiting illnesses. I explained the composition of the palliative care team.  Palliative care helps patients and families navigate their care while focusing on the whole person; providing emotional, social and spiritual support  Palliative care often assists with symptom management, information sharing about what to expect from the illness, available treatment options and what effect those options may have on the disease course, and provide effective communication and caring support.    Prognosis, Goals, & Planning:   Functional Status just prior to this current hospitalization:  Patient was living independently in the community with his spouse    Prognosis, Goals, and/or Advance Care Planning:  See above for details    Code Status was addressed today:   No      Patient's decision making preferences: shared with support from loved ones        Patient has decision-making capacity today for complex decisions: Intact          Coping, Meaning, & Spirituality:   Mood, coping, and/or meaning in the context of serious illness were addressed today: Yes    Social:   Living situation:lives with  significant other/spouse  Important relationships/caregivers: Spouse Melissa  Areas of fulfillment/laury: Patient enjoys being busy with things that challenge him mentally and cognitively, he enjoys puzzles and learning new things and keeps busy with things like automobile repair and restoration.    Medications:  Reviewed this patient's medication profile and medications from this hospitalization. N    ROS:  Comprehensive ROS is reviewed and is negative except as here & per HPI:     Physical Exam   Vital Signs with Ranges  Temp:  [97.9  F (36.6  C)-99  F (37.2  C)] 97.9  F (36.6  C)  Pulse:  [63-74] 65  Resp:  [16] 16  BP: ()/(62-73) 102/66  SpO2:  [96 %-100 %] 96 %  Wt Readings from Last 10 Encounters:   09/06/24 61.7 kg (136 lb 1.6 oz)   08/26/24 64.4 kg (142 lb)   08/08/24 64.3 kg (141 lb 12.8 oz)   07/05/24 59.9 kg (132 lb 1.6 oz)   06/26/24 63.3 kg (139 lb 9.6 oz)   06/19/24 59.9 kg (132 lb)   06/12/24 63.5 kg (140 lb)   06/11/24 62.9 kg (138 lb 9.6 oz)   06/05/24 62.6 kg (138 lb)   05/22/24 61.5 kg (135 lb 9.6 oz)     136 lbs 1.6 oz    PHYSICAL EXAM:  Gen: NAD, sitting up comfortably in bed, pleasant and cooperative with interview and exam  HEENT: normocephalic, no scleral icterus, no perioral lesions, oral mucosa moist  CV: RRR at time of exam  Pulm: good air movement bilaterally without wheezing  Abd: soft, +bs, nontender to palpation diffusely, nondistended  Skin: no rash or jaundice on limited exam  Neuro: AOx3, no tremor  Psych: mood-affect congruent      Data reviewed:  Last Comprehensive Metabolic Panel:  Lab Results   Component Value Date     09/06/2024    POTASSIUM 4.3 09/06/2024    CHLORIDE 103 09/06/2024    CO2 23 09/06/2024    ANIONGAP 9 09/06/2024     (H) 09/06/2024    BUN 31.2 (H) 09/06/2024    CR 1.10 09/06/2024    GFRESTIMATED 70 09/06/2024    PRANAV 8.4 (L) 09/06/2024 9/5/24 MELBA  Interpretation Summary (per Cardiology):  Left ventricular function is severely reduced. The  ejection fraction is 10-  15%. Severe diffuse hypokinesis.  Global right ventricular function is mildly reduced.  Moderate biatrial enlargement.  The left atrial appendage is normal. It is free of spontaneous echo contrast  and thrombus.  No pericardial effusion.    Medical Decision Making       60 MINUTES SPENT BY ME on the date of service doing chart review, history, exam, documentation & further activities per the note.

## 2024-09-07 LAB
ALBUMIN SERPL BCG-MCNC: 2.8 G/DL (ref 3.5–5.2)
ALP SERPL-CCNC: 89 U/L (ref 40–150)
ALT SERPL W P-5'-P-CCNC: 23 U/L (ref 0–70)
ANION GAP SERPL CALCULATED.3IONS-SCNC: 11 MMOL/L (ref 7–15)
AST SERPL W P-5'-P-CCNC: 32 U/L (ref 0–45)
BASOPHILS # BLD AUTO: 0 10E3/UL (ref 0–0.2)
BASOPHILS NFR BLD AUTO: 0 %
BILIRUB SERPL-MCNC: 0.3 MG/DL
BUN SERPL-MCNC: 30.8 MG/DL (ref 8–23)
CALCIUM SERPL-MCNC: 8.4 MG/DL (ref 8.8–10.4)
CHLORIDE SERPL-SCNC: 102 MMOL/L (ref 98–107)
COTININE SERPL-MCNC: <5 NG/ML
CREAT SERPL-MCNC: 0.99 MG/DL (ref 0.67–1.17)
EGFRCR SERPLBLD CKD-EPI 2021: 80 ML/MIN/1.73M2
EOSINOPHIL # BLD AUTO: 0 10E3/UL (ref 0–0.7)
EOSINOPHIL NFR BLD AUTO: 0 %
ERYTHROCYTE [DISTWIDTH] IN BLOOD BY AUTOMATED COUNT: 15.7 % (ref 10–15)
GLUCOSE SERPL-MCNC: 118 MG/DL (ref 70–99)
HCO3 SERPL-SCNC: 22 MMOL/L (ref 22–29)
HCT VFR BLD AUTO: 30.8 % (ref 40–53)
HGB BLD-MCNC: 9.7 G/DL (ref 13.3–17.7)
IMM GRANULOCYTES # BLD: 0.1 10E3/UL
IMM GRANULOCYTES NFR BLD: 1 %
INR PPP: 1.28 (ref 0.85–1.15)
LYMPHOCYTES # BLD AUTO: 0.4 10E3/UL (ref 0.8–5.3)
LYMPHOCYTES NFR BLD AUTO: 4 %
MAGNESIUM SERPL-MCNC: 2.1 MG/DL (ref 1.7–2.3)
MCH RBC QN AUTO: 30.4 PG (ref 26.5–33)
MCHC RBC AUTO-ENTMCNC: 31.5 G/DL (ref 31.5–36.5)
MCV RBC AUTO: 97 FL (ref 78–100)
MONOCYTES # BLD AUTO: 0.2 10E3/UL (ref 0–1.3)
MONOCYTES NFR BLD AUTO: 2 %
NEUTROPHILS # BLD AUTO: 11.2 10E3/UL (ref 1.6–8.3)
NEUTROPHILS NFR BLD AUTO: 93 %
NICOTINE SERPL-MCNC: <5 NG/ML
NRBC # BLD AUTO: 0 10E3/UL
NRBC BLD AUTO-RTO: 0 /100
PLATELET # BLD AUTO: 324 10E3/UL (ref 150–450)
POTASSIUM SERPL-SCNC: 4.5 MMOL/L (ref 3.4–5.3)
PROT SERPL-MCNC: 6.4 G/DL (ref 6.4–8.3)
RBC # BLD AUTO: 3.19 10E6/UL (ref 4.4–5.9)
SODIUM SERPL-SCNC: 135 MMOL/L (ref 135–145)
UFH PPP CHRO-ACNC: 0.33 IU/ML
WBC # BLD AUTO: 12 10E3/UL (ref 4–11)

## 2024-09-07 PROCEDURE — 120N000003 HC R&B IMCU UMMC

## 2024-09-07 PROCEDURE — 85025 COMPLETE CBC W/AUTO DIFF WBC: CPT

## 2024-09-07 PROCEDURE — 250N000013 HC RX MED GY IP 250 OP 250 PS 637

## 2024-09-07 PROCEDURE — 36415 COLL VENOUS BLD VENIPUNCTURE: CPT

## 2024-09-07 PROCEDURE — 85520 HEPARIN ASSAY: CPT

## 2024-09-07 PROCEDURE — 99233 SBSQ HOSP IP/OBS HIGH 50: CPT | Mod: GC | Performed by: INTERNAL MEDICINE

## 2024-09-07 PROCEDURE — 83735 ASSAY OF MAGNESIUM: CPT

## 2024-09-07 PROCEDURE — 85610 PROTHROMBIN TIME: CPT

## 2024-09-07 PROCEDURE — 80053 COMPREHEN METABOLIC PANEL: CPT

## 2024-09-07 PROCEDURE — 250N000011 HC RX IP 250 OP 636

## 2024-09-07 RX ORDER — CAPSAICIN 0.025 %
CREAM (GRAM) TOPICAL 3 TIMES DAILY PRN
Status: DISCONTINUED | OUTPATIENT
Start: 2024-09-07 | End: 2024-09-18

## 2024-09-07 RX ORDER — GABAPENTIN 100 MG/1
100 CAPSULE ORAL AT BEDTIME
Status: DISCONTINUED | OUTPATIENT
Start: 2024-09-07 | End: 2024-09-18

## 2024-09-07 RX ADMIN — DIGOXIN 125 MCG: 125 TABLET ORAL at 07:52

## 2024-09-07 RX ADMIN — Medication 1 TABLET: at 20:00

## 2024-09-07 RX ADMIN — EMPAGLIFLOZIN 10 MG: 10 TABLET, FILM COATED ORAL at 07:52

## 2024-09-07 RX ADMIN — ACETAMINOPHEN 650 MG: 325 TABLET ORAL at 02:49

## 2024-09-07 RX ADMIN — POTASSIUM CHLORIDE 20 MEQ: 750 TABLET, EXTENDED RELEASE ORAL at 07:51

## 2024-09-07 RX ADMIN — ACETAMINOPHEN 650 MG: 325 TABLET ORAL at 07:51

## 2024-09-07 RX ADMIN — ESCITALOPRAM OXALATE 10 MG: 10 TABLET ORAL at 07:52

## 2024-09-07 RX ADMIN — MAGNESIUM OXIDE TAB 400 MG (241.3 MG ELEMENTAL MG) 400 MG: 400 (241.3 MG) TAB at 07:52

## 2024-09-07 RX ADMIN — Medication 1 TABLET: at 07:53

## 2024-09-07 RX ADMIN — FUROSEMIDE 40 MG: 40 TABLET ORAL at 07:53

## 2024-09-07 RX ADMIN — Medication 6.25 MG: at 17:13

## 2024-09-07 RX ADMIN — CLOPIDOGREL BISULFATE 75 MG: 75 TABLET ORAL at 07:52

## 2024-09-07 RX ADMIN — Medication 6.25 MG: at 07:52

## 2024-09-07 RX ADMIN — HEPARIN SODIUM 1150 UNITS/HR: 10000 INJECTION, SOLUTION INTRAVENOUS at 18:17

## 2024-09-07 RX ADMIN — Medication 5 MG: at 00:40

## 2024-09-07 RX ADMIN — LIDOCAINE 1 PATCH: 4 PATCH TOPICAL at 20:00

## 2024-09-07 RX ADMIN — ACETAMINOPHEN 650 MG: 325 TABLET ORAL at 13:04

## 2024-09-07 RX ADMIN — ACETAMINOPHEN 650 MG: 325 TABLET ORAL at 22:10

## 2024-09-07 RX ADMIN — Medication 5 MG: at 22:13

## 2024-09-07 RX ADMIN — Medication 6.25 MG: at 13:04

## 2024-09-07 RX ADMIN — OXYCODONE HYDROCHLORIDE AND ACETAMINOPHEN 1000 MG: 500 TABLET ORAL at 07:51

## 2024-09-07 RX ADMIN — GABAPENTIN 100 MG: 100 CAPSULE ORAL at 22:09

## 2024-09-07 RX ADMIN — ATORVASTATIN CALCIUM 80 MG: 80 TABLET, FILM COATED ORAL at 07:53

## 2024-09-07 RX ADMIN — AMIODARONE HYDROCHLORIDE 200 MG: 200 TABLET ORAL at 07:53

## 2024-09-07 RX ADMIN — ACETAMINOPHEN 650 MG: 325 TABLET ORAL at 17:13

## 2024-09-07 ASSESSMENT — ACTIVITIES OF DAILY LIVING (ADL)
ADLS_ACUITY_SCORE: 26
ADLS_ACUITY_SCORE: 24
ADLS_ACUITY_SCORE: 26

## 2024-09-07 NOTE — PLAN OF CARE
Neuro: A&Ox4.   Cardiac: A paced w/ underlying SR, prolonged AV conduction, BBB. VSS.   Respiratory: Sating upper 90s on RA.  GI/: Adequate urine output.   Diet/appetite: Tolerating regular diet. Eating well.  Activity:  SBA, up to chair and in halls w/ IV pole push  Pain: 650mg tylenol given about every 4hrs for chronic back pain, lidocaine patch applied at 2000.   Skin: No new deficits noted.  LDA's: L PIV infusing heparin @ 1150u/hr, next 10a ~0015    Plan: Continue with POC. Notify primary team with changes.     Ileana Norwood RN

## 2024-09-07 NOTE — PLAN OF CARE
"6C OT/Cardiac Rehab    Cardiac Rehab orders from 9/6 received and reviewed for consult reason: \"aggressive therapies, access to gym over weekend wtih tech.\"     Rehab department does not staff cardiac rehab technician over weekends for [facility] patients. Patient is scheduled for 3x/wk cardiac rehab and [facility] to perform walking/hallway HEP outside of therapy times. Pt is ambulating >1,000ft. Next appointment scheduled for Monday, 9/9.    Appreciate updated orders and please consider the above when ordering over weekend. Thank you.    Lluvia Solorzano, OTR/L  Available via Fanzter  "

## 2024-09-07 NOTE — PROGRESS NOTES
[facility]  Cardiology II Service / Advanced Heart Failure  Daily Progress Note    Patient: Duane C Johnson      : 1950      MRN: [id number]    Assessment/Plan:   Duane C Johnson is a 74 year old male pmhx of anterior STEMI s/p PCI to the pLAD on 10/26/23 with a VT/VF arrest the next day (10/27) with patent stents and unchanged anatomy on repeat angiogram. Placed on amiodarone and discharged 23, ICD was not indicated as this was within 48h of his MI. His EF prior to discharge was 20-30% with a large area of akinesis in the LAD, placed on apixaban due to this (Apixaban dcd on 24). Has had multiple admissions for HF exacerbation last year.  Admitted on 24. He presents for 2 weeks of worsening fatigue; found to have BNP of 16k and L pleural effusion. Admitted for diuresis and RHC.     Today's changes:   - Back in Afib today (). Rate controlled and asymptomatic   - Continuing to have back pain, Gabapentin 100mg at bedtime and capsicin cream ordered.   - Continue Lasix 40mg   - Continuing Captopril at 6.25 TID. Tolerating this dose without dizziness   - Continue Digoxin 125mcg   - Ongoing evaluation for LVAD   > Schedule for teeth extractions expected on Monday    > Optimizing nutritional status   > cMRI likely next week to definitively rule out thrombus   - Neuropsych inpatient eval    # Acute heart failure exacerbation  #Possible ambulatory shock  # HFrEf 2/2 ICM (EF 15-20% by TTE 24)  Fluid status: Euvolemic  ACEi/ARB/ARNi/afterload reduction: Captopril 6.25 mg TID, hold for SBP < 90  BB: Hold metoprolol due to reduced CO and borderline BP  Aldosterone antagonist: unable to start d/t hypotension   SGLT2i: Empagliflozin 10mg  SCD prophylaxis: s/p ICD 2024  NSAID use: contraindicated  Antiarrhythmic: Amiodarone 200mg   Diuretic: Started 40 mg daily Lasix for maintenance on     Echo from  with EF 15-20% and trace MR. See below for full  report.    RHC from 9/3 showing normal R sided pressures, mildly increased L sided pressures, reduced CO. See below for full report.  BP as low as 84/62 (MAP 69), has remained asymptomatic other than fatigue and mild SOB.    - BNP of 16k on admission with L sided pleural effusion   - Continuing to hold Metoprolol, Jardiance was restarted   - Lasix 40 mg daily for maintenance, started 9/5   - Low intensity heparin gtt     - Continue Captopril 6.25 TID    - Continue Digoxin 125mcg    - cMRI as below for LVAD eval    #AFib  # h/o VT/VF arrest in 2023  # ICD placed on 5/8/24  Was on apixaban for low EF (akinesis of the mid-distal anterior,mid anteroseptum, distal septum and the apex) and questionable history of Afib. Since there was no episode of Afib captured apixaban was stopped on 7/5/24. Device interrogation showing AF episode started on 8/28/24. Converted to A-paced rhythm after cardioversion on 9/5.   - PTA Amio 200, Magnesium 400, Potassium 20  - Holding PTA Metoprolol   - Mag and Potassium replacement per nursing protocol  - Had successful cardioversion on 9/5    #LVAD evaluation   Pt has received financial clearance and LVAD evaluation is proceeding.  - Dental evaluation shows carries in all remaining teeth. Will need extraction   > Awaiting dental eval  - Urology consulted for history of prostate cancer. Has completed therapy and last PSA was undetectable. This is not an obstacle to LVAD  - Abd US without evidence of chronic liver disease.   - No significant carotid stenosis   - ABIs normal   - Inpatient neuropsych eval requested (not absolutely necessary)   - Optimizing nutritional status   - cMRI to definitively rule out thrombus given recent afib    # CAD s/p PCI to LAD  # STEMI  Anterior STEMI on 10/26/23 with a VT/VF arrest the next day. Repeat angio showed patent stents and unchanged anatomy.   - PTA plavix, Atorvastatin 80    #DOMENICA  Baseline Cr normal at ~1. Cr of 1.55 on 8/24 with this acute episode of  HF. Concern for type 1 cardiorenal syndrome. uPCR negative. Improving.    - Diuresis as above  - avoid nephrotoxins    # Moderate malnutrition  - Nutrition consulted to help manage  - Encourage high protein drinks     ================================  Chronic Problems:    #Prostate Cancer  Diagnosed in 2023. Underwent Leuprolide on 3/19/24 and 24. Completed radiation treatment on 24   - Consider testosterone level as outpatient to assess for recovery after ADT    #L inguinal hernia  Pt reports this has been present for some time. Not causing any pain. Not a surgical candidate with current HF status so will not consult surgery at this time.     Hospital Checklist:  DVT Prophylaxis: Heparin gtt  Code Status: Full Code  Bowel regimen: PRN  Diet/Nutrition: 2L fluid and 2G sodium restriction.   Lines: Left PIV    Disposition Plannin+ days for LVAD evaluation    Staffed w/ Dr. Cruz.    Danial Fofana MD  PGY3, Internal Medicine  Cardiology 2 Service     2024  ==================================    Subjective:   No acute events. Felt well this AM overall but has ongoing back pain due to his bed. States that ylneol helps and is requesting more but is maxed on his dose. States that the Lidocaine patch doesn't help. Reports walking around the halls quite a bit with rehab yesterday. His wife plans on bringing some of their home protein powder today. Denies chest pain, SOB, dizziness, palpitations, abd pain, n/v/d, difficulty with meals.     Objective:     Vitals:    24 0511 24 0400 24 0443   Weight: 61.8 kg (136 lb 3.2 oz) 61.9 kg (136 lb 8 oz) 61.7 kg (136 lb 1.6 oz)     Vital Signs with Ranges  Temp:  [97.6  F (36.4  C)-98.7  F (37.1  C)] 98.1  F (36.7  C)  Pulse:  [63-66] 64  Resp:  [16-17] 16  BP: (102-116)/(64-73) 114/64  SpO2:  [96 %-100 %] 96 %  I/O last 3 completed shifts:  In: 50 [P.O.:50]  Out: 1350 [Urine:1100; Emesis/NG output:250]    Exam:  General: No acute  distress  HEENT: Normocephalic, atraumatic  CV: Irregular rhythm, regular rate, no murmurs   Lungs: On RA, no crackles. No wheezes, no increased WOB  Abd: Soft, non-tender, non-distended  Back: No midline or paraspinal tenderness.  Ext: Warm and well-perfused, no lower extremity edema  Neuro: Awake, alert, conversational, moving all extremities spontaneously throughout examination    Medications   Current Facility-Administered Medications   Medication Dose Route Frequency Provider Last Rate Last Admin    heparin 25,000 units in 0.45% NaCl 250 mL ANTICOAGULANT infusion  0-5,000 Units/hr Intravenous Continuous Cristobal Fisher MD 11.5 mL/hr at 09/07/24 0000 1,150 Units/hr at 09/07/24 0000    May take oral meds with a sip of water, the morning of Cardioversion procedure.       Does not apply Continuous PRN Vladimir Johnson MD        sodium chloride 0.9 % infusion  1,000 mL Intravenous Continuous Narciso Liu  mL/hr at 09/05/24 0835 400 mL at 09/05/24 0835    sodium chloride 0.9 % infusion   Intravenous Continuous Hawa Keita PA-C         Current Facility-Administered Medications   Medication Dose Route Frequency Provider Last Rate Last Admin    amiodarone (PACERONE) tablet 200 mg  200 mg Oral Daily Cristobal Fisehr MD   200 mg at 09/06/24 0851    atorvastatin (LIPITOR) tablet 80 mg  80 mg Oral Daily Cristobal Fisher MD   80 mg at 09/06/24 0851    captopril (CAPOTEN) half-tab 6.25 mg  6.25 mg Oral TID AC Cristobal Fisher MD   6.25 mg at 09/06/24 1819    clopidogrel (PLAVIX) tablet 75 mg  75 mg Oral Daily Cristobal Fisher MD   75 mg at 09/06/24 0850    digoxin (LANOXIN) tablet 125 mcg  125 mcg Oral Daily Danial Fofana MD   125 mcg at 09/06/24 0852    empagliflozin (JARDIANCE) tablet 10 mg  10 mg Oral Daily Cristobal Fisher MD   10 mg at 09/06/24 0849    escitalopram (LEXAPRO) tablet 10 mg  10 mg Oral Daily Cristobal Fisher MD   10 mg at 09/06/24 0851    furosemide (LASIX) tablet 40 mg  40 mg Oral Daily Ct,  MD Danial   40 mg at 09/06/24 0849    Lidocaine (LIDOCARE) 4 % Patch 1 patch  1 patch Transdermal Q24h Danial Fofana MD   1 patch at 09/06/24 2040    magnesium oxide (MAG-OX) tablet 400 mg  400 mg Oral Daily Cristobal Fisher MD   400 mg at 09/06/24 0850    multivitamin w/minerals (THERA-VIT-M) tablet 1 tablet  1 tablet Oral BID Cristobal Fisher MD   1 tablet at 09/06/24 2040    potassium chloride tray ER (KLOR-CON M10) CR tablet 20 mEq  20 mEq Oral Daily Cristobal Fisher MD   20 mEq at 09/06/24 0849    sodium chloride (PF) 0.9% PF flush 10 mL  10 mL Intravenous Once Vladimir Johnson MD        sodium chloride (PF) 0.9% PF flush 3 mL  3 mL Intravenous Q8H Hawa Keita PA-C   3 mL at 09/06/24 1310    sodium chloride (PF) 0.9% PF flush 3 mL  3 mL Intravenous Q8H Vladimir Johnson MD        vitamin C (ASCORBIC ACID) tablet 1,000 mg  1,000 mg Oral Daily Cristobal Fisher MD   1,000 mg at 09/06/24 0848       Data   Recent Labs   Lab 09/07/24  0628 09/06/24  0931 09/06/24  0548 09/05/24  1047 09/05/24  0422   WBC 12.0*  --  11.8*  --  13.4*   HGB 9.7*  --  9.1*  --  9.6*   MCV 97  --  97  --  96     --  318  --  318   INR 1.28*  --  1.30*  --  1.35*     --  135  --  133*   POTASSIUM 4.5  --  4.3 4.5 4.4   CHLORIDE 102  --  103  --  101   CO2 22  --  23  --  24   BUN 30.8*  --  31.2*  --  42.1*   CR 0.99 1.10 1.11  --  1.25*   ANIONGAP 11  --  9  --  8   PRANAV 8.4*  --  8.4*  --  8.2*   *  --  104*  --  111*   ALBUMIN 2.8*  --  2.7*  --  2.7*   PROTTOTAL 6.4  --  6.2*  --  6.0*   BILITOTAL 0.3  --  0.3  --  0.3   ALKPHOS 89  --  84  --  132   ALT 23  --  17  --  22   AST 32  --  22  --  27     RHC 9/3/24    RA: 11/11/9  RV:39/9  PA:39/16/26  PCWP:16/22/15    Pa Sat:48.2%  Goldy; CO/CI: 2.91/1.71  Thermodilution; CO/CI:3.53/2.08   Right sided filling pressures are normal.   Left sided filling pressures are mildly elevated. Mild elevated pulmonary hypertension.   Reduced cardiac output level.          Echocardiogram Complete   Result Value    LVEF  15-20% (severely reduced)    [facility]    [id number]  [id number]  [id number]  [id number]^ROXANA^SONIA     [facility]  Echocardiography Laboratory  [address] [place] [id number]     Name: JOHNSON, DUANE C  MRN: [id number]  : 1950  Study Date: 2024 08:20 AM  Age: 74 yrs  Gender: Male  Patient Location: [facility]  Reason For Study: CHF  Ordering Physician: SONIA SCHMIDT  Performed By: Yessenia Cano RDCS     BSA: 1.7 m2  Height: 66 in  Weight: 145 lb  HR: 71  BP: 94/73 mmHg  ______________________________________________________________________________  Procedure  Complete Portable Echo Adult. Contrast Optison. Optison (NDC #9760-8045-35)  given intravenously. Patient was given 6 ml mixture of 3 ml Optison and 6 ml  saline. 3 ml wasted.  ______________________________________________________________________________  Interpretation Summary  Left ventricular function is decreased. The ejection fraction is 15-20%  (severely reduced).  Moderate left ventricular dilation is present.  Apical wall akinesis is present.  Severe diffuse hypokinesis is present.  There is no thrombus seen in the left ventricle.  The right ventricle is normal size.  Global right ventricular function is normal.  Mild functional mitral insufficiency is present.  Mild to moderate tricuspid functional insufficiency is present.  Pulmonary hypertension is present.The right ventricular systolic pressure is  40mmHg above the right atrial pressure.  IVC diameter and respiratory changes fall into an intermediate range  suggesting an RA pressure of 8 mmHg.     This study was compared with the study from 2024 .MR, TR improved. LVEF  similar in both studies compared side to side. So overall no change in LVEF  ______________________________________________________________________________  Left Ventricle  Left ventricular wall thickness is normal.  Moderate left ventricular dilation  is present. Left ventricular diastolic function is not assessable. Left  ventricular function is decreased. The ejection fraction is 15-20% (severely  reduced). Apical wall akinesis is present. Severe diffuse hypokinesis is  present. There is no thrombus seen in the left ventricle.     Right Ventricle  The right ventricle is normal size. Global right ventricular function is  normal. A pacemaker lead is noted in the right ventricle.     Atria  Moderate left atrial enlargement is present. Moderate right atrial enlargement  is present.     Mitral Valve  Mild mitral insufficiency is present.     Aortic Valve  Trileaflet aortic sclerosis without stenosis. On Doppler interrogation, there  is no significant stenosis or regurgitation.     Tricuspid Valve  Mild to moderate tricuspid insufficiency is present. The right ventricular  systolic pressure is approximated at 39.7 mmHg plus the right atrial pressure.  Pulmonary hypertension is present. The right ventricular systolic pressure is  40mmHg above the right atrial pressure.     Pulmonic Valve  On Doppler interrogation, there is no significant stenosis or regurgitation.     Vessels  The aorta root is normal. IVC diameter and respiratory changes fall into an  intermediate range suggesting an RA pressure of 8 mmHg.     Pericardium  No pericardial effusion is present.     Compared to Previous Study  This study was compared with the study from 2/19/2024 . MR, TR improved. LVEF  similar in both studies compared side to side. So overall no change in LVEF.  ______________________________________________________________________________  MMode/2D Measurements & Calculations     IVSd: 0.89 cm  LVIDd: 6.0 cm  LVIDs: 5.5 cm  LVPWd: 0.79 cm  FS: 7.1 %  LV mass(C)d: 197.0 grams  LV mass(C)dI: 112.9 grams/m2  LVOT diam: 2.0 cm  LVOT area: 3.2 cm2  EF Biplane: 16.8 %  LA Volume (BP): 71.3 ml  LA Volume Index (BP): 41.0 ml/m2  RV Base: 4.6 cm  RWT: 0.27      TAPSE: 1.0 cm     Doppler Measurements & Calculations  MV E max ivan: 82.7 cm/sec  LV V1 max P.9 mmHg  LV V1 max: 84.2 cm/sec  LV V1 VTI: 13.8 cm  MR PISA: 4.2 cm2  MR ERO: 0.33 cm2  MR volume: 35.7 ml  SV(LVOT): 43.8 ml  SI(LVOT): 25.1 ml/m2  PA acc time: 0.08 sec  TR max ivan: 315.1 cm/sec  TR max P.7 mmHg  RV S Ivan: 10.7 cm/sec     ______________________________________________________________________________  Report approved by: Jeanine MEJIA 2024 11:05 AM            Examination: XR CHEST 2 VIEWS 2024 3:06 PM     Indication: Shortness of breath     Comparison: Radiograph 2024. CT 2023.     Findings:  PA and lateral views of the chest. Left chest wall implantable cardiac  defibrillator with grossly intact leads/shock coil. Coronary stenting.  Trachea is midline. Stable mild cardiomegaly. Mild blunting of the  left costophrenic angle, likely representing small pleural effusion.  No pneumothorax. No focal consolidation or any definite abnormal  airspace opacities. No acute or suspicious osseous abnormality.  Unremarkable visualized abdomen.      Impression   Impression:   1. Stable mild cardiomegaly with implantable cardiac defibrillator and  coronary stenting.  2. Small left pleural effusion.     I have personally reviewed the examination and initial interpretation  and I agree with the findings.     PANCHITO EID MD         SYSTEM ID:  [id number]      2023 CMR  Clinical history: 73 year old with anterior STEMI, cardiac arrest in Oct 2023  Comparison CMR: none  1. The left ventricle is severely enlarged. There is wall thinning and akinesis of the mid-distal anterior,  mid anteroseptum, distal septum and the apex  The global systolic function is severely reduced. The LVEF is 28%.  2. The right ventricle is normal in cavity size. The global systolic function is normal. The RVEF is 52%.   3. The right atrium is normal and the left atrium is severely enlarged.  4. There is  mild mitral regurgitation.   5. There is transmural late gadolinium enhancement in mid-distal anterior, mid anteroseptum, distal  septum and the apex consistent with a large infarction in the LAD territory.   6. There is no pericardial effusion.  7. There is no intracardiac thrombus.  8. There are bilateral pleural effusions.  CONCLUSIONS:   Ischemic cardiomyopathy with a large anteroapical infarction.   Severely reduced left ventricular function and normal right ventricular function, LVEF 28% and RVEF 52%.     Cardiac Catheterization:  10/26/23    1st Mrg lesion is 20% stenosed.    Prox LAD to Mid LAD lesion is 100% stenosed.    1st Diag-1 lesion is 80% stenosed.    1st Diag-2 lesion is 50% stenosed.    Dist LAD lesion is 100% stenosed.    RPDA lesion is 70% stenosed.    Dist RCA lesion is 40% stenosed.     Anterior STEMI  Two vessel obstructive CAD (100% pLAD occlusion, 70% rPDA stenosis, 80% diagonal 1 stenosis)  PCI of pLAD to mLAD with BRENDA x 1 (Synergy 2.50x 28 mm) post dilated to 2.75 vessel  CVC placement in RIJ  LVEDP of 26 mmHg  Hemostasis of RRA with TR band      2/19/2024 Echo  Interpretation Summary     1. The left ventricle is moderately dilated. Left ventricular hypertrophy is  noted by two-dimensional echocardiography. Proximal septal thickening is  noted. Left ventricular systolic function is moderate to severely reduced. The  visual ejection fraction is 25-30%. Biplane LVEF is 25%. Diastolic Doppler  findings (E/E' ratio and/or other parameters) suggest left ventricular filling  pressures are increased. There is severe hypokinesis to akinesis of the mid  anterior/anterolateral/anteroseptal/inferoseptal walls and all apical segments  of the left ventricle. There is no thrombus seen in the left ventricle.  2. The right ventricle is normal size. The right ventricular systolic function  is normal.  3. The left atrium is moderately dilated. Right atrial size is normal. There  is no color Doppler evidence  of an atrial shunt.  4. There is moderate to mod-severe (2-3+) mitral regurgitation.  5. There is mild to moderate (1-2+) tricuspid regurgitation.Right ventricular  systolic pressure is elevated, consistent with moderate pulmonary  hypertension.  6. No pericardial effusion.  7. In direct comparison to the previous study dated 10/30/2023, there has been  an interval increase in the degree of mitral regurgitation. The other findings  are similar.     RHC 5/6/24  RA: 10/9/6 mmHg  RV: 61/11 mmHg  PA: 63/24/38 mmHg  PCWP: Unable to obtain accurate measurement  CO/CI (Goldy): 3.89/2.3 L/min/m2    PA sat: 64%  MAP: 85 mmHg       Right sided filling pressures are normal.    Mild elevated pulmonary hypertension.    Normal cardiac output level.        Device interrogated on 8/30/24  Device: [employer] D533 RESONATE HF ICD  Normal device function.  Mode: DDDR  bpm  AP: 33%  : 3%  Intrinsic rhythm: Afib w/ VS  bpm  Thoracic Impedance: Below reference line, suggests possible intrathoracic fluid accumulation.  Lead Trends Appear Stable.  Estimated battery longevity to RRT = 12.5 years.    Atrial Arrhythmia: AF episode began on 8/28/24  AF [place]: 11%  Anticoagulant: Eliquis  Ventricular Arrhythmia: None  Setting Changes: Rate response turned ON in device, pacing mode changed to DDDR from DDD, fallback rate during AT/AF episodes 70 bpm.

## 2024-09-07 NOTE — PLAN OF CARE
"D: Acute exacerbation of CHF (congestive heart failure) (H)  (primary encounter diagnosis)  Acute on chronic systolic congestive heart failure (H)  Pleural effusion  Atrial fibrillation, unspecified type (H)  AICD (automatic cardioverter/defibrillator) present  Personal history of malignant neoplasm of prostate  History of tobacco use    I: Monitored vitals and assessed pt status.   Changed: Unchanged  Running: Heparin running @1150 unit(s)/kg/h and next Xa in AM  PRN: Tylenol prn for back pain and Aqua heating system  Neuro: A&Ox4.   Cardiac: A-Paced, Afebrile, VSS.   Respiratory: Lungs sound clear but diminished, denies cough, dyspnea with exercise present, Kelly>96% on RA  GI/: Active bowel sound, passing flatus, last BM on 9/7 and Voiding spontaneously.   Diet/appetite: Tolerating regular, 2LFR diet. Denies nausea.  Activity: Up independently in the room   Pain: Pain is well managed with prn Tylenol and aqua heating system  Skin: No new deficits noted.  Lines: Piv with heparin running @ 1150, next Xa in AM    I/O this shift:  In: 50 [P.O.:50]  Out: 250 [Emesis/NG output:250]    Temp:  [97.6  F (36.4  C)-98.7  F (37.1  C)] 98.1  F (36.7  C)  Pulse:  [63-66] 64  Resp:  [16-17] 16  BP: (102-116)/(64-73) 114/64  SpO2:  [96 %-100 %] 96 %      P: Continue to monitor Pt status and report changes to treatment team.  Problem: Adult Inpatient Plan of Care  Goal: Plan of Care Review  Description: The Plan of Care Review/Shift note should be completed every shift.  The Outcome Evaluation is a brief statement about your assessment that the patient is improving, declining, or no change.  This information will be displayed automatically on your shift  note.  Outcome: Progressing  Goal: Patient-Specific Goal (Individualized)  Description: You can add care plan individualizations to a care plan. Examples of Individualization might be:  \"Parent requests to be called daily at 9am for status\", \"I have a hard time hearing out of my " "right ear\", or \"Do not touch me to wake me up as it startles  me\".  Outcome: Progressing  Goal: Absence of Hospital-Acquired Illness or Injury  Outcome: Progressing  Intervention: Identify and Manage Fall Risk  Recent Flowsheet Documentation  Taken 9/7/2024 0400 by Rodney Sheehan RN  Safety Promotion/Fall Prevention:   activity supervised   clutter free environment maintained  Taken 9/7/2024 0000 by Rodney Sheehan RN  Safety Promotion/Fall Prevention:   activity supervised   clutter free environment maintained  Intervention: Prevent Skin Injury  Recent Flowsheet Documentation  Taken 9/7/2024 0400 by Rodney Sheehan RN  Body Position: position changed independently  Skin Protection: adhesive use limited  Device Skin Pressure Protection:   absorbent pad utilized/changed   adhesive use limited  Taken 9/7/2024 0000 by Rodney Sheehan RN  Body Position: position changed independently  Skin Protection: adhesive use limited  Device Skin Pressure Protection:   absorbent pad utilized/changed   adhesive use limited  Intervention: Prevent and Manage VTE (Venous Thromboembolism) Risk  Recent Flowsheet Documentation  Taken 9/7/2024 0400 by Rodney Sheehan RN  VTE Prevention/Management: SCDs off (sequential compression devices)  Taken 9/7/2024 0000 by Rodney Sheehan RN  VTE Prevention/Management: SCDs off (sequential compression devices)  Intervention: Prevent Infection  Recent Flowsheet Documentation  Taken 9/7/2024 0400 by Rodney Sheehan RN  Infection Prevention: cohorting utilized  Taken 9/7/2024 0000 by Rodney Sheehan RN  Infection Prevention: cohorting utilized  Goal: Optimal Comfort and Wellbeing  Outcome: Progressing  Intervention: Monitor Pain and Promote Comfort  Recent Flowsheet Documentation  Taken 9/7/2024 0515 by Rodney Sheehan RN  Pain Management Interventions: heat applied  Taken 9/7/2024 0336 by Rodney Sheehan RN  Pain Management Interventions: " heat applied  Taken 9/7/2024 0000 by Rodney Sheehan RN  Pain Management Interventions: heat applied  Taken 9/6/2024 2327 by Rodney Sheehan RN  Pain Management Interventions: declines  Goal: Readiness for Transition of Care  Outcome: Progressing     Problem: Risk for Delirium  Goal: Optimal Coping  Outcome: Progressing  Intervention: Optimize Psychosocial Adjustment to Delirium  Recent Flowsheet Documentation  Taken 9/7/2024 0400 by Rodney Sheehan RN  Supportive Measures: active listening utilized  Taken 9/7/2024 0000 by Rodney Sheehan RN  Supportive Measures: active listening utilized  Goal: Improved Behavioral Control  Outcome: Progressing  Intervention: Prevent and Manage Agitation  Recent Flowsheet Documentation  Taken 9/7/2024 0400 by Rodney Sheehan RN  Environment Familiarity/Consistency: daily routine followed  Taken 9/7/2024 0000 by Rodney Sheehan RN  Environment Familiarity/Consistency: daily routine followed  Intervention: Minimize Safety Risk  Recent Flowsheet Documentation  Taken 9/7/2024 0400 by Rodney Sheehan RN  Communication Enhancement Strategies: call light answered in person  Enhanced Safety Measures: monitor patients coagulation values  Taken 9/7/2024 0000 by Rodney Sheehan RN  Communication Enhancement Strategies: call light answered in person  Enhanced Safety Measures: monitor patients coagulation values  Goal: Improved Attention and Thought Clarity  Outcome: Progressing  Intervention: Maximize Cognitive Function  Recent Flowsheet Documentation  Taken 9/7/2024 0400 by Rodney Sheehan RN  Sensory Stimulation Regulation: care clustered  Reorientation Measures: clock in view  Taken 9/7/2024 0000 by Rodney Sheehan RN  Sensory Stimulation Regulation: care clustered  Reorientation Measures: clock in view  Goal: Improved Sleep  Outcome: Progressing     Problem: Heart Failure  Goal: Optimal Coping  Outcome: Progressing  Intervention:  Support Psychosocial Response  Recent Flowsheet Documentation  Taken 9/7/2024 0400 by Rodney Sheehan RN  Supportive Measures: active listening utilized  Taken 9/7/2024 0000 by Rodney Sheehan RN  Supportive Measures: active listening utilized  Goal: Optimal Cardiac Output  Outcome: Progressing  Intervention: Optimize Cardiac Output  Recent Flowsheet Documentation  Taken 9/7/2024 0400 by Rodney Sheehan RN  Stabilization Measures: legs elevated  Environmental Support: calm environment promoted  Taken 9/7/2024 0000 by Rodney Sheehan RN  Stabilization Measures: legs elevated  Environmental Support: calm environment promoted  Goal: Stable Heart Rate and Rhythm  Outcome: Progressing  Goal: Optimal Functional Ability  Outcome: Progressing  Intervention: Optimize Functional Ability  Recent Flowsheet Documentation  Taken 9/7/2024 0400 by Rodney Sheehan RN  Activity Management:   up ad mackenzie   activity adjusted per tolerance   activity encouraged  Taken 9/7/2024 0000 by Rodney Sheehan RN  Activity Management:   up ad mackenzie   activity adjusted per tolerance   activity encouraged  Goal: Fluid and Electrolyte Balance  Outcome: Progressing  Intervention: Monitor and Manage Fluid and Electrolyte Balance  Recent Flowsheet Documentation  Taken 9/7/2024 0400 by Rodney Sheehan RN  Fluid/Electrolyte Management: fluids provided  Taken 9/7/2024 0000 by Rodney Sheehan RN  Fluid/Electrolyte Management: fluids provided  Goal: Improved Oral Intake  Outcome: Progressing  Intervention: Promote and Optimize Nutrition Intake  Recent Flowsheet Documentation  Taken 9/7/2024 0400 by Rodney Sheehan RN  Oral Nutrition Promotion: physical activity promoted  Taken 9/7/2024 0000 by Rodney Sheehan RN  Oral Nutrition Promotion: physical activity promoted  Goal: Effective Oxygenation and Ventilation  Outcome: Progressing  Intervention: Promote Airway Secretion Clearance  Recent Flowsheet  Documentation  Taken 9/7/2024 0400 by Rodney Sheehan RN  Cough And Deep Breathing: done independently per patient  Activity Management:   up ad mackenzie   activity adjusted per tolerance   activity encouraged  Taken 9/7/2024 0000 by Rodney Sheehan RN  Cough And Deep Breathing: done independently per patient  Activity Management:   up ad mackenzie   activity adjusted per tolerance   activity encouraged  Intervention: Optimize Oxygenation and Ventilation  Recent Flowsheet Documentation  Taken 9/7/2024 0400 by Rodney Sheehan RN  Head of Bed (HOB) Positioning: HOB at 20 degrees  Taken 9/7/2024 0000 by Rodney Sheehan RN  Head of Bed (HOB) Positioning: HOB at 20 degrees  Goal: Effective Breathing Pattern During Sleep  Outcome: Progressing  Intervention: Monitor and Manage Obstructive Sleep Apnea  Recent Flowsheet Documentation  Taken 9/7/2024 0400 by Rodney Sheehan RN  Medication Review/Management: medications reviewed  Taken 9/7/2024 0000 by Rodney Sheehan RN  Medication Review/Management: medications reviewed     Problem: Cardiovascular Surgery  Goal: Improved Activity Tolerance  Outcome: Progressing  Intervention: Optimize Tolerance for Activity  Recent Flowsheet Documentation  Taken 9/7/2024 0400 by Rodney Sheehan RN  Environmental Support: calm environment promoted  Taken 9/7/2024 0000 by Rodney Sheehan RN  Environmental Support: calm environment promoted  Goal: Optimal Coping with Heart Surgery  Outcome: Progressing  Intervention: Support Psychosocial Response to Surgery  Recent Flowsheet Documentation  Taken 9/7/2024 0400 by Rodney Sheehan RN  Supportive Measures: active listening utilized  Taken 9/7/2024 0000 by Rodney Sheehan RN  Supportive Measures: active listening utilized  Goal: Absence of Bleeding  Outcome: Progressing  Goal: Effective Bowel Elimination  Outcome: Progressing  Goal: Effective Cardiac Function  Outcome: Progressing  Intervention:  Optimize Cardiac Output and Blood Flow  Recent Flowsheet Documentation  Taken 9/7/2024 0400 by Rodney Sheehan RN  Stabilization Measures: legs elevated  Taken 9/7/2024 0000 by Rodney Sheehan RN  Stabilization Measures: legs elevated  Goal: Optimal Cerebral Tissue Perfusion  Outcome: Progressing  Intervention: Protect and Optimize Cerebral Perfusion  Recent Flowsheet Documentation  Taken 9/7/2024 0400 by Rodney Sheehan RN  Sensory Stimulation Regulation: care clustered  Cerebral Perfusion Promotion: blood pressure monitored  Head of Bed (HOB) Positioning: HOB at 20 degrees  Taken 9/7/2024 0000 by Rodney Sheehan RN  Sensory Stimulation Regulation: care clustered  Cerebral Perfusion Promotion: blood pressure monitored  Head of Bed (HOB) Positioning: HOB at 20 degrees  Goal: Fluid and Electrolyte Balance  Outcome: Progressing  Intervention: Monitor and Manage Fluid and Electrolyte Balance  Recent Flowsheet Documentation  Taken 9/7/2024 0400 by Rodney Sheehan RN  Fluid/Electrolyte Management: fluids provided  Taken 9/7/2024 0000 by Rodney Sheehan RN  Fluid/Electrolyte Management: fluids provided  Goal: Blood Glucose Level Within Targeted Range  Outcome: Progressing  Goal: Absence of Infection Signs and Symptoms  Outcome: Progressing  Intervention: Prevent or Manage Infection  Recent Flowsheet Documentation  Taken 9/7/2024 0400 by Rodney Sheehan RN  Infection Prevention: cohorting utilized  Taken 9/7/2024 0000 by Rodney Sheehan RN  Infection Prevention: cohorting utilized  Goal: Anesthesia/Sedation Recovery  Outcome: Progressing  Intervention: Optimize Anesthesia Recovery  Recent Flowsheet Documentation  Taken 9/7/2024 0400 by Rodney Sheehan RN  Safety Promotion/Fall Prevention:   activity supervised   clutter free environment maintained  Reorientation Measures: clock in view  Stabilization Measures: legs elevated  Taken 9/7/2024 0000 by Rodney Sheehan  RN  Safety Promotion/Fall Prevention:   activity supervised   clutter free environment maintained  Reorientation Measures: clock in view  Stabilization Measures: legs elevated  Goal: Acceptable Pain Control  Outcome: Progressing  Intervention: Prevent or Manage Pain  Recent Flowsheet Documentation  Taken 9/7/2024 0515 by Rodney Sheehan RN  Pain Management Interventions: heat applied  Taken 9/7/2024 0336 by Rodney Sheehan RN  Pain Management Interventions: heat applied  Taken 9/7/2024 0000 by Rodney Sheehan RN  Pain Management Interventions: heat applied  Taken 9/6/2024 2327 by Rodney Sheehan RN  Pain Management Interventions: declines  Goal: Nausea and Vomiting Relief  Outcome: Progressing  Intervention: Prevent or Manage Nausea and Vomiting  Recent Flowsheet Documentation  Taken 9/7/2024 0400 by Rodney Sheehan RN  Nausea/Vomiting Interventions: (denies) other (see comments)  Taken 9/7/2024 0000 by Rodney Sheehan RN  Nausea/Vomiting Interventions: (denies) other (see comments)  Goal: Effective Urinary Elimination  Outcome: Progressing  Goal: Effective Oxygenation and Ventilation  Outcome: Progressing  Intervention: Promote Airway Secretion Clearance  Recent Flowsheet Documentation  Taken 9/7/2024 0400 by Rodney Sheehan RN  Cough And Deep Breathing: done independently per patient  Taken 9/7/2024 0000 by Rodney Sheehan RN  Cough And Deep Breathing: done independently per patient   Goal Outcome Evaluation: Progressing

## 2024-09-07 NOTE — PROGRESS NOTES
Calorie Count  Intake recorded for: 9/6  Total Kcals: 0 Total Protein: 0g  Kcals from Hospital Food: 0   Protein: 0g  Kcals from Outside Food (average):0 Protein: 0g  # Meals Ordered from Kitchen: 3  # Meals Recorded: 0  # Supplements Recorded: 0

## 2024-09-07 NOTE — PLAN OF CARE
"D: Pt presents for 2 weeks of worsening fatigue; found to have BNP of 16k and L pleural effusion. Admitted for diuresis and RHC. Now undergoing LVAD workup.    I: Monitored vitals and assessed pt status. Encouraged activity and PO intake. Repositioning and heat for back discomfort.      Running: Heparin @1150units/hr. Next Xa recheck tomorrow AM.   PRN: Tylenol q 4 hours for back pain  Tele: Converted to A-fib at 0758. A-fib with v-pacing.  O2: RA  Mobility: Up in room ad mackenzie.      A: A&Ox4. VSS. Continues to complain of chronic back pain, controlled with Tylenol. Up in halls ad mackenzie. Complained of some SOB/lethargy this afternoon, felt \"rejuvenated\" after walk in halls.      P: Patient to have teeth removed next week in OR, LVAD implant the following week (dates TBD). Continue to encourage PO intake and activity to improve strength. Continue to monitor pt status and report changes to Cards 2.     "

## 2024-09-08 LAB
ALBUMIN SERPL BCG-MCNC: 2.9 G/DL (ref 3.5–5.2)
ALP SERPL-CCNC: 94 U/L (ref 40–150)
ALT SERPL W P-5'-P-CCNC: 27 U/L (ref 0–70)
ANION GAP SERPL CALCULATED.3IONS-SCNC: 9 MMOL/L (ref 7–15)
AST SERPL W P-5'-P-CCNC: 34 U/L (ref 0–45)
ATRIAL RATE - MUSE: 375 BPM
ATRIAL RATE - MUSE: 60 BPM
ATRIAL RATE - MUSE: 72 BPM
BASOPHILS # BLD AUTO: 0 10E3/UL (ref 0–0.2)
BASOPHILS NFR BLD AUTO: 0 %
BILIRUB SERPL-MCNC: 0.3 MG/DL
BUN SERPL-MCNC: 31.7 MG/DL (ref 8–23)
CALCIUM SERPL-MCNC: 8.6 MG/DL (ref 8.8–10.4)
CHLORIDE SERPL-SCNC: 104 MMOL/L (ref 98–107)
CREAT SERPL-MCNC: 0.89 MG/DL (ref 0.67–1.17)
DIASTOLIC BLOOD PRESSURE - MUSE: NORMAL MMHG
EGFRCR SERPLBLD CKD-EPI 2021: 90 ML/MIN/1.73M2
EOSINOPHIL # BLD AUTO: 0 10E3/UL (ref 0–0.7)
EOSINOPHIL NFR BLD AUTO: 0 %
ERYTHROCYTE [DISTWIDTH] IN BLOOD BY AUTOMATED COUNT: 16.1 % (ref 10–15)
GLUCOSE SERPL-MCNC: 130 MG/DL (ref 70–99)
HCO3 SERPL-SCNC: 24 MMOL/L (ref 22–29)
HCT VFR BLD AUTO: 31.1 % (ref 40–53)
HGB BLD-MCNC: 9.8 G/DL (ref 13.3–17.7)
IMM GRANULOCYTES # BLD: 0.2 10E3/UL
IMM GRANULOCYTES NFR BLD: 1 %
INR PPP: 1.26 (ref 0.85–1.15)
INTERPRETATION ECG - MUSE: NORMAL
LYMPHOCYTES # BLD AUTO: 0.3 10E3/UL (ref 0.8–5.3)
LYMPHOCYTES NFR BLD AUTO: 2 %
MAGNESIUM SERPL-MCNC: 2 MG/DL (ref 1.7–2.3)
MCH RBC QN AUTO: 31 PG (ref 26.5–33)
MCHC RBC AUTO-ENTMCNC: 31.5 G/DL (ref 31.5–36.5)
MCV RBC AUTO: 98 FL (ref 78–100)
MONOCYTES # BLD AUTO: 0.2 10E3/UL (ref 0–1.3)
MONOCYTES NFR BLD AUTO: 2 %
NEUTROPHILS # BLD AUTO: 15.1 10E3/UL (ref 1.6–8.3)
NEUTROPHILS NFR BLD AUTO: 95 %
NRBC # BLD AUTO: 0 10E3/UL
NRBC BLD AUTO-RTO: 0 /100
P AXIS - MUSE: 35 DEGREES
P AXIS - MUSE: NORMAL DEGREES
P AXIS - MUSE: NORMAL DEGREES
PLATELET # BLD AUTO: 331 10E3/UL (ref 150–450)
POTASSIUM SERPL-SCNC: 4.3 MMOL/L (ref 3.4–5.3)
PR INTERVAL - MUSE: 280 MS
PR INTERVAL - MUSE: NORMAL MS
PR INTERVAL - MUSE: NORMAL MS
PROT SERPL-MCNC: 6.3 G/DL (ref 6.4–8.3)
QRS DURATION - MUSE: 172 MS
QRS DURATION - MUSE: 174 MS
QRS DURATION - MUSE: 174 MS
QT - MUSE: 446 MS
QT - MUSE: 456 MS
QT - MUSE: 494 MS
QTC - MUSE: 494 MS
QTC - MUSE: 511 MS
QTC - MUSE: 519 MS
R AXIS - MUSE: -76 DEGREES
R AXIS - MUSE: -88 DEGREES
R AXIS - MUSE: 265 DEGREES
RBC # BLD AUTO: 3.16 10E6/UL (ref 4.4–5.9)
SODIUM SERPL-SCNC: 137 MMOL/L (ref 135–145)
SYSTOLIC BLOOD PRESSURE - MUSE: NORMAL MMHG
T AXIS - MUSE: -20 DEGREES
T AXIS - MUSE: -20 DEGREES
T AXIS - MUSE: 10 DEGREES
UFH PPP CHRO-ACNC: 0.32 IU/ML
VENTRICULAR RATE- MUSE: 60 BPM
VENTRICULAR RATE- MUSE: 78 BPM
VENTRICULAR RATE- MUSE: 79 BPM
WBC # BLD AUTO: 15.8 10E3/UL (ref 4–11)

## 2024-09-08 PROCEDURE — 36415 COLL VENOUS BLD VENIPUNCTURE: CPT

## 2024-09-08 PROCEDURE — 85520 HEPARIN ASSAY: CPT | Performed by: INTERNAL MEDICINE

## 2024-09-08 PROCEDURE — 250N000013 HC RX MED GY IP 250 OP 250 PS 637

## 2024-09-08 PROCEDURE — 120N000003 HC R&B IMCU UMMC

## 2024-09-08 PROCEDURE — 250N000011 HC RX IP 250 OP 636

## 2024-09-08 PROCEDURE — 80053 COMPREHEN METABOLIC PANEL: CPT

## 2024-09-08 PROCEDURE — 83735 ASSAY OF MAGNESIUM: CPT | Performed by: INTERNAL MEDICINE

## 2024-09-08 PROCEDURE — 85025 COMPLETE CBC W/AUTO DIFF WBC: CPT

## 2024-09-08 PROCEDURE — 99233 SBSQ HOSP IP/OBS HIGH 50: CPT | Mod: GC | Performed by: INTERNAL MEDICINE

## 2024-09-08 PROCEDURE — 85610 PROTHROMBIN TIME: CPT

## 2024-09-08 RX ORDER — MAGNESIUM OXIDE 400 MG/1
400 TABLET ORAL EVERY 4 HOURS
Status: COMPLETED | OUTPATIENT
Start: 2024-09-08 | End: 2024-09-08

## 2024-09-08 RX ORDER — METHOCARBAMOL 500 MG/1
500 TABLET, FILM COATED ORAL
Status: COMPLETED | OUTPATIENT
Start: 2024-09-08 | End: 2024-09-09

## 2024-09-08 RX ADMIN — HEPARIN SODIUM 1150 UNITS/HR: 10000 INJECTION, SOLUTION INTRAVENOUS at 13:07

## 2024-09-08 RX ADMIN — ACETAMINOPHEN 650 MG: 325 TABLET ORAL at 03:51

## 2024-09-08 RX ADMIN — ACETAMINOPHEN 650 MG: 325 TABLET ORAL at 09:14

## 2024-09-08 RX ADMIN — EMPAGLIFLOZIN 10 MG: 10 TABLET, FILM COATED ORAL at 09:07

## 2024-09-08 RX ADMIN — Medication 1 TABLET: at 20:17

## 2024-09-08 RX ADMIN — OXYCODONE HYDROCHLORIDE AND ACETAMINOPHEN 1000 MG: 500 TABLET ORAL at 09:07

## 2024-09-08 RX ADMIN — ATORVASTATIN CALCIUM 80 MG: 80 TABLET, FILM COATED ORAL at 09:08

## 2024-09-08 RX ADMIN — GABAPENTIN 100 MG: 100 CAPSULE ORAL at 20:17

## 2024-09-08 RX ADMIN — Medication 6.25 MG: at 12:19

## 2024-09-08 RX ADMIN — DIGOXIN 125 MCG: 125 TABLET ORAL at 09:08

## 2024-09-08 RX ADMIN — ESCITALOPRAM OXALATE 10 MG: 10 TABLET ORAL at 09:08

## 2024-09-08 RX ADMIN — MAGNESIUM OXIDE TAB 400 MG (241.3 MG ELEMENTAL MG) 400 MG: 400 (241.3 MG) TAB at 09:09

## 2024-09-08 RX ADMIN — POTASSIUM CHLORIDE 20 MEQ: 750 TABLET, EXTENDED RELEASE ORAL at 09:07

## 2024-09-08 RX ADMIN — CLOPIDOGREL BISULFATE 75 MG: 75 TABLET ORAL at 09:09

## 2024-09-08 RX ADMIN — AMIODARONE HYDROCHLORIDE 200 MG: 200 TABLET ORAL at 09:07

## 2024-09-08 RX ADMIN — Medication 5 MG: at 20:17

## 2024-09-08 RX ADMIN — Medication 6.25 MG: at 09:14

## 2024-09-08 RX ADMIN — FUROSEMIDE 40 MG: 40 TABLET ORAL at 09:09

## 2024-09-08 RX ADMIN — Medication 1 TABLET: at 09:08

## 2024-09-08 RX ADMIN — Medication 6.25 MG: at 16:43

## 2024-09-08 RX ADMIN — MAGNESIUM OXIDE TAB 400 MG (241.3 MG ELEMENTAL MG) 400 MG: 400 (241.3 MG) TAB at 09:08

## 2024-09-08 RX ADMIN — ACETAMINOPHEN 650 MG: 325 TABLET ORAL at 21:06

## 2024-09-08 RX ADMIN — ACETAMINOPHEN 650 MG: 325 TABLET ORAL at 17:41

## 2024-09-08 RX ADMIN — MAGNESIUM OXIDE TAB 400 MG (241.3 MG ELEMENTAL MG) 400 MG: 400 (241.3 MG) TAB at 12:18

## 2024-09-08 ASSESSMENT — ACTIVITIES OF DAILY LIVING (ADL)
ADLS_ACUITY_SCORE: 26
ADLS_ACUITY_SCORE: 30
ADLS_ACUITY_SCORE: 26
ADLS_ACUITY_SCORE: 26
ADLS_ACUITY_SCORE: 30
ADLS_ACUITY_SCORE: 26
ADLS_ACUITY_SCORE: 26
ADLS_ACUITY_SCORE: 30
ADLS_ACUITY_SCORE: 26
ADLS_ACUITY_SCORE: 30
ADLS_ACUITY_SCORE: 26
ADLS_ACUITY_SCORE: 30
ADLS_ACUITY_SCORE: 26
ADLS_ACUITY_SCORE: 30
ADLS_ACUITY_SCORE: 27
ADLS_ACUITY_SCORE: 30
ADLS_ACUITY_SCORE: 26
ADLS_ACUITY_SCORE: 26
ADLS_ACUITY_SCORE: 30
ADLS_ACUITY_SCORE: 26
ADLS_ACUITY_SCORE: 26

## 2024-09-08 NOTE — PROGRESS NOTES
Calorie Count  Intake recorded for: 9/7  Total Kcals: 1160 Total Protein: 71g  Kcals from Hospital Food: 1160   Protein: 71g  Kcals from Outside Food (average):0 Protein: 0g  # Meals Ordered from Kitchen: 3  # Meals Recorded: 2  1: 100% greek yogurt, 75% chicken noodle soup, mashed potatoes   2: 100% puree chicken with gravy, puree peas, ice cream, 4 oz whole milk with beneprotein  # Supplements Recorded: 1  1: 100% ensure HP

## 2024-09-08 NOTE — PROGRESS NOTES
[facility]  Cardiology II Service / Advanced Heart Failure  Daily Progress Note    Patient: Duane C Johnson      : 1950      MRN: [id number]    Assessment/Plan:   Duane C Johnson is a 74 year old male pmhx of anterior STEMI s/p PCI to the pLAD on 10/26/23 with a VT/VF arrest the next day (10/27) with patent stents and unchanged anatomy on repeat angiogram. Placed on amiodarone and discharged 23, ICD was not indicated as this was within 48h of his MI. His EF prior to discharge was 20-30% with a large area of akinesis in the LAD, placed on apixaban due to this (Apixaban dcd on 24). Has had multiple admissions for HF exacerbation last year.  Admitted on 24. He presents for 2 weeks of worsening fatigue; found to have BNP of 16k and L pleural effusion. Admitted for diuresis and RHC. CPX and RHC showed significant functional limitations due to heart failue. Plan for DT VAD while inpatient.     Today's changes:   - Remains in Afib, rate controlled, asymptomatic   - Back pain improved with Aqua heating system   - Held Jardiance for possible dental surgeries this week   - Continue Lasix 40mg   - Continuing Captopril at 6.25 TID. Tolerating this dose without dizziness   - Continue Digoxin 125mcg   - Ongoing evaluation for LVAD   > Will have more information about plan for teeth extractions on Monday   > Optimizing nutritional status. Wife has brought home protein powder and shakes.    > cMRI likely this week to definitively rule out thrombus   - Neuropsych inpatient eval    # Acute heart failure exacerbation  #Possible ambulatory shock  # HFrEf 2/2 ICM (EF 15-20% by TTE 24)  Fluid status: Euvolemic  ACEi/ARB/ARNi/afterload reduction: Captopril 6.25 mg TID, hold for SBP < 90  BB: Hold metoprolol due to reduced CO and borderline BP  Aldosterone antagonist: unable to start d/t hypotension   SGLT2i: Empagliflozin 10mg (held on  for possible  surgeries)  SCD prophylaxis: s/p ICD 5/2024  NSAID use: contraindicated  Antiarrhythmic: Amiodarone 200mg   Diuretic: Started 40 mg daily Lasix for maintenance on 9/5    Echo from 8/31 with EF 15-20% and trace MR. See below for full report.    RHC from 9/3 showing normal R sided pressures, mildly increased L sided pressures, reduced CO. See below for full report.  BP as low as 84/62 (MAP 69), has remained asymptomatic other than fatigue and mild SOB.    - BNP of 16k on admission with L sided pleural effusion   - Continuing to hold Metoprolol   - Lasix 40 mg daily for maintenance, started 9/5   - Low intensity heparin gtt     - Continue Captopril 6.25 TID    - Continue Digoxin 125mcg    - cMRI as below for LVAD eval    #AFib  # h/o VT/VF arrest in 2023  # ICD placed on 5/8/24  Was on apixaban for low EF (akinesis of the mid-distal anterior,mid anteroseptum, distal septum and the apex) and questionable history of Afib. Since there was no episode of Afib captured apixaban was stopped on 7/5/24. Device interrogation showing AF episode started on 8/28/24. Converted to A-paced rhythm after cardioversion on 9/5. Back in AF on 9/7.   - PTA Amio 200, Magnesium 400, Potassium 20  - Holding PTA Metoprolol   - Mag and Potassium replacement per nursing protocol  - Had successful cardioversion on 9/5   > Back in Afib on 9/7. Maintaining regular rates and asymptomatic    #LVAD evaluation   Pt has received financial clearance and LVAD evaluation is proceeding.  - Dental evaluation shows carries in all remaining teeth. Will need extraction   > Awaiting schedule for extractions  - Urology consulted for history of prostate cancer. Has completed therapy and last PSA was undetectable. This is not an obstacle to LVAD  - Abd US without evidence of chronic liver disease.   - No significant carotid stenosis   - ABIs normal   - Inpatient neuropsych eval requested (not absolutely necessary)   - Optimizing nutritional status   - cMRI to  definitively rule out thrombus given recent afib    # CAD s/p PCI to LAD  # STEMI  Anterior STEMI on 10/26/23 with a VT/VF arrest the next day. Repeat angio showed patent stents and unchanged anatomy.   - PTA plavix, Atorvastatin 80    #DOMENICA  Baseline Cr normal at ~1. Cr of 1.55 on  with this acute episode of HF. Concern for type 1 cardiorenal syndrome. uPCR negative. Improving.    - Diuresis as above  - avoid nephrotoxins    # Moderate malnutrition  - Nutrition consulted to help manage  - Encourage high protein drinks     ================================  Chronic Problems:    #Prostate Cancer  Diagnosed in 2023. Underwent Leuprolide on 3/19/24 and 24. Completed radiation treatment on 24   - Consider testosterone level as outpatient to assess for recovery after ADT    #L inguinal hernia  Pt reports this has been present for some time. Not causing any pain. Not a surgical candidate with current HF status so will not consult surgery at this time.     Hospital Checklist:  DVT Prophylaxis: Heparin gtt  Code Status: Full Code  Bowel regimen: PRN  Diet/Nutrition: 2L fluid and 2G sodium restriction.   Lines: Left PIV    Disposition Plannin+ days for LVAD evaluation    Staffed w/ Dr. Cruz.    Cristobal Fisher MD  PGY1, Internal Medicine  Cardiology 2 Service     2024  ==================================    Subjective:   No acute events overnight. Reports that the aqua heating system helped significantly with his back pain last night. Slept great. Unfortunately it began leaking sometime this morning and soaked his clothes and the bed. A replacement was ordered.     His wife brought in some additional protein shakes and protein powders yesterday. Calorie count from yesterday only showed 1160 unfortunately. Continuing to encourage PO intake and ambulation. Pt reports that he took three trips around the unit yesterday; walked for ~10 minutes each time until his legs became too fatigued. Denies  experiencing any chest pain, dizziness, or SOB with walking.     Objective:     Vitals:    09/05/24 0400 09/06/24 0443 09/08/24 0400   Weight: 61.9 kg (136 lb 8 oz) 61.7 kg (136 lb 1.6 oz) 62 kg (136 lb 11.2 oz)     Vital Signs with Ranges  Temp:  [97.9  F (36.6  C)-98.6  F (37  C)] 98.3  F (36.8  C)  Pulse:  [69-84] 79  Resp:  [16] 16  BP: ()/(52-84) 91/62  SpO2:  [96 %-99 %] 97 %  I/O last 3 completed shifts:  In: 1045.67 [P.O.:660; I.V.:385.67]  Out: 1000 [Urine:1000]    Exam:  General: No acute distress  HEENT: Normocephalic, atraumatic  CV: Irregular rhythm, regular rate, no murmurs   Lungs: On RA, no crackles. No wheezes, no increased WOB  Abd: Soft, non-tender, non-distended  Back: No midline or paraspinal tenderness.  Ext: Warm and well-perfused, no lower extremity edema  Neuro: Awake, alert, conversational, moving all extremities spontaneously throughout examination    Medications   Current Facility-Administered Medications   Medication Dose Route Frequency Provider Last Rate Last Admin    heparin 25,000 units in 0.45% NaCl 250 mL ANTICOAGULANT infusion  0-5,000 Units/hr Intravenous Continuous Cristobal Fisher MD 11.5 mL/hr at 09/08/24 0000 1,150 Units/hr at 09/08/24 0000    May take oral meds with a sip of water, the morning of Cardioversion procedure.       Does not apply Continuous PRN Vladimir Johnson MD         Current Facility-Administered Medications   Medication Dose Route Frequency Provider Last Rate Last Admin    amiodarone (PACERONE) tablet 200 mg  200 mg Oral Daily Cristobal Fisher MD   200 mg at 09/07/24 0753    atorvastatin (LIPITOR) tablet 80 mg  80 mg Oral Daily Cristobal Fisher MD   80 mg at 09/07/24 0753    captopril (CAPOTEN) half-tab 6.25 mg  6.25 mg Oral TID AC Cristobal Fisher MD   6.25 mg at 09/07/24 1713    clopidogrel (PLAVIX) tablet 75 mg  75 mg Oral Daily Cristobal Fisher MD   75 mg at 09/07/24 0752    digoxin (LANOXIN) tablet 125 mcg  125 mcg Oral Daily Danial Fofana MD   125  mcg at 09/07/24 0752    empagliflozin (JARDIANCE) tablet 10 mg  10 mg Oral Daily Cristobal Fisher MD   10 mg at 09/07/24 0752    escitalopram (LEXAPRO) tablet 10 mg  10 mg Oral Daily Cristobal Fisher MD   10 mg at 09/07/24 0752    furosemide (LASIX) tablet 40 mg  40 mg Oral Daily Danial Fofana MD   40 mg at 09/07/24 0753    gabapentin (NEURONTIN) capsule 100 mg  100 mg Oral At Bedtime Cristobal Fisher MD   100 mg at 09/07/24 2209    Lidocaine (LIDOCARE) 4 % Patch 1 patch  1 patch Transdermal Q24h Danial Fofana MD   1 patch at 09/07/24 2000    magnesium oxide (MAG-OX) tablet 400 mg  400 mg Oral Daily Cristobal Fisher MD   400 mg at 09/07/24 0752    multivitamin w/minerals (THERA-VIT-M) tablet 1 tablet  1 tablet Oral BID Cristobal Fisher MD   1 tablet at 09/07/24 2000    potassium chloride tray ER (KLOR-CON M10) CR tablet 20 mEq  20 mEq Oral Daily Cristobal Fisher MD   20 mEq at 09/07/24 0751    sodium chloride (PF) 0.9% PF flush 10 mL  10 mL Intravenous Once Vladimir Johnson MD        sodium chloride (PF) 0.9% PF flush 3 mL  3 mL Intravenous Q8H Vladimir Johnson MD        vitamin C (ASCORBIC ACID) tablet 1,000 mg  1,000 mg Oral Daily Cristobal Fisher MD   1,000 mg at 09/07/24 0751       Data   Recent Labs   Lab 09/08/24  0536 09/07/24  0628 09/06/24  0931 09/06/24  0548   WBC 15.8* 12.0*  --  11.8*   HGB 9.8* 9.7*  --  9.1*   MCV 98 97  --  97    324  --  318   INR 1.26* 1.28*  --  1.30*    135  --  135   POTASSIUM 4.3 4.5  --  4.3   CHLORIDE 104 102  --  103   CO2 24 22  --  23   BUN 31.7* 30.8*  --  31.2*   CR 0.89 0.99 1.10 1.11   ANIONGAP 9 11  --  9   PRANAV 8.6* 8.4*  --  8.4*   * 118*  --  104*   ALBUMIN 2.9* 2.8*  --  2.7*   PROTTOTAL 6.3* 6.4  --  6.2*   BILITOTAL 0.3 0.3  --  0.3   ALKPHOS 94 89  --  84   ALT 27 23  --  17   AST 34 32  --  22     RHC 9/3/24    RA: 11/11/9  RV:39/9  PA:39/16/26  PCWP:16/22/15    Pa Sat:48.2%  Goldy; CO/CI: 2.91/1.71  Thermodilution; CO/CI:3.53/2.08   Right sided  filling pressures are normal.   Left sided filling pressures are mildly elevated. Mild elevated pulmonary hypertension.   Reduced cardiac output level.         Echocardiogram Complete   Result Value    LVEF  15-20% (severely reduced)    [facility]    [id number]  [facility]  [id number]  [id number]^ROXANA^SONIA     [facility]  Echocardiography Laboratory  [address] [place] [address]     Name: JOHNSON, DUANE C  MRN: [id number]  : 1950  Study Date: 2024 08:20 AM  Age: 74 yrs  Gender: Male  Patient Location: [facility]  Reason For Study: CHF  Ordering Physician: SONIA SCHMIDT  Performed By: Yessenia Cano RDCS     BSA: 1.7 m2  Height: 66 in  Weight: 145 lb  HR: 71  BP: 94/73 mmHg  ______________________________________________________________________________  Procedure  Complete Portable Echo Adult. Contrast Optison. Optison (NDC #9391-6937-98)  given intravenously. Patient was given 6 ml mixture of 3 ml Optison and 6 ml  saline. 3 ml wasted.  ______________________________________________________________________________  Interpretation Summary  Left ventricular function is decreased. The ejection fraction is 15-20%  (severely reduced).  Moderate left ventricular dilation is present.  Apical wall akinesis is present.  Severe diffuse hypokinesis is present.  There is no thrombus seen in the left ventricle.  The right ventricle is normal size.  Global right ventricular function is normal.  Mild functional mitral insufficiency is present.  Mild to moderate tricuspid functional insufficiency is present.  Pulmonary hypertension is present.The right ventricular systolic pressure is  40mmHg above the right atrial pressure.  IVC diameter and respiratory changes fall into an intermediate range  suggesting an RA pressure of 8 mmHg.     This study was compared with the study from 2024 .MR, TR improved. LVEF  similar in both studies compared side to side. So overall no change  in LVEF  ______________________________________________________________________________  Left Ventricle  Left ventricular wall thickness is normal. Moderate left ventricular dilation  is present. Left ventricular diastolic function is not assessable. Left  ventricular function is decreased. The ejection fraction is 15-20% (severely  reduced). Apical wall akinesis is present. Severe diffuse hypokinesis is  present. There is no thrombus seen in the left ventricle.     Right Ventricle  The right ventricle is normal size. Global right ventricular function is  normal. A pacemaker lead is noted in the right ventricle.     Atria  Moderate left atrial enlargement is present. Moderate right atrial enlargement  is present.     Mitral Valve  Mild mitral insufficiency is present.     Aortic Valve  Trileaflet aortic sclerosis without stenosis. On Doppler interrogation, there  is no significant stenosis or regurgitation.     Tricuspid Valve  Mild to moderate tricuspid insufficiency is present. The right ventricular  systolic pressure is approximated at 39.7 mmHg plus the right atrial pressure.  Pulmonary hypertension is present. The right ventricular systolic pressure is  40mmHg above the right atrial pressure.     Pulmonic Valve  On Doppler interrogation, there is no significant stenosis or regurgitation.     Vessels  The aorta root is normal. IVC diameter and respiratory changes fall into an  intermediate range suggesting an RA pressure of 8 mmHg.     Pericardium  No pericardial effusion is present.     Compared to Previous Study  This study was compared with the study from 2/19/2024 . MR, TR improved. LVEF  similar in both studies compared side to side. So overall no change in LVEF.  ______________________________________________________________________________  MMode/2D Measurements & Calculations     IVSd: 0.89 cm  LVIDd: 6.0 cm  LVIDs: 5.5 cm  LVPWd: 0.79 cm  FS: 7.1 %  LV mass(C)d: 197.0 grams  LV mass(C)dI: 112.9  grams/m2  LVOT diam: 2.0 cm  LVOT area: 3.2 cm2  EF Biplane: 16.8 %  LA Volume (BP): 71.3 ml  LA Volume Index (BP): 41.0 ml/m2  RV Base: 4.6 cm  RWT: 0.27     TAPSE: 1.0 cm     Doppler Measurements & Calculations  MV E max ivan: 82.7 cm/sec  LV V1 max P.9 mmHg  LV V1 max: 84.2 cm/sec  LV V1 VTI: 13.8 cm  MR PISA: 4.2 cm2  MR ERO: 0.33 cm2  MR volume: 35.7 ml  SV(LVOT): 43.8 ml  SI(LVOT): 25.1 ml/m2  PA acc time: 0.08 sec  TR max ivan: 315.1 cm/sec  TR max P.7 mmHg  RV S Ivan: 10.7 cm/sec     ______________________________________________________________________________  Report approved by: Jeanine MEJIA 2024 11:05 AM            Examination: XR CHEST 2 VIEWS 2024 3:06 PM     Indication: Shortness of breath     Comparison: Radiograph 2024. CT 2023.     Findings:  PA and lateral views of the chest. Left chest wall implantable cardiac  defibrillator with grossly intact leads/shock coil. Coronary stenting.  Trachea is midline. Stable mild cardiomegaly. Mild blunting of the  left costophrenic angle, likely representing small pleural effusion.  No pneumothorax. No focal consolidation or any definite abnormal  airspace opacities. No acute or suspicious osseous abnormality.  Unremarkable visualized abdomen.      Impression   Impression:   1. Stable mild cardiomegaly with implantable cardiac defibrillator and  coronary stenting.  2. Small left pleural effusion.     I have personally reviewed the examination and initial interpretation  and I agree with the findings.     PANCHITO EID MD         SYSTEM ID:  [id number]      2023 CMR  Clinical history: 73 year old with anterior STEMI, cardiac arrest in Oct 2023  Comparison CMR: none  1. The left ventricle is severely enlarged. There is wall thinning and akinesis of the mid-distal anterior,  mid anteroseptum, distal septum and the apex  The global systolic function is severely reduced. The LVEF is 28%.  2. The right ventricle is normal in  cavity size. The global systolic function is normal. The RVEF is 52%.   3. The right atrium is normal and the left atrium is severely enlarged.  4. There is mild mitral regurgitation.   5. There is transmural late gadolinium enhancement in mid-distal anterior, mid anteroseptum, distal  septum and the apex consistent with a large infarction in the LAD territory.   6. There is no pericardial effusion.  7. There is no intracardiac thrombus.  8. There are bilateral pleural effusions.  CONCLUSIONS:   Ischemic cardiomyopathy with a large anteroapical infarction.   Severely reduced left ventricular function and normal right ventricular function, LVEF 28% and RVEF 52%.     Cardiac Catheterization:  10/26/23    1st Mrg lesion is 20% stenosed.    Prox LAD to Mid LAD lesion is 100% stenosed.    1st Diag-1 lesion is 80% stenosed.    1st Diag-2 lesion is 50% stenosed.    Dist LAD lesion is 100% stenosed.    RPDA lesion is 70% stenosed.    Dist RCA lesion is 40% stenosed.     Anterior STEMI  Two vessel obstructive CAD (100% pLAD occlusion, 70% rPDA stenosis, 80% diagonal 1 stenosis)  PCI of pLAD to mLAD with BRENDA x 1 (Synergy 2.50x 28 mm) post dilated to 2.75 vessel  CVC placement in RIJ  LVEDP of 26 mmHg  Hemostasis of RRA with TR band      2/19/2024 Echo  Interpretation Summary     1. The left ventricle is moderately dilated. Left ventricular hypertrophy is  noted by two-dimensional echocardiography. Proximal septal thickening is  noted. Left ventricular systolic function is moderate to severely reduced. The  visual ejection fraction is 25-30%. Biplane LVEF is 25%. Diastolic Doppler  findings (E/E' ratio and/or other parameters) suggest left ventricular filling  pressures are increased. There is severe hypokinesis to akinesis of the mid  anterior/anterolateral/anteroseptal/inferoseptal walls and all apical segments  of the left ventricle. There is no thrombus seen in the left ventricle.  2. The right ventricle is normal size.  The right ventricular systolic function  is normal.  3. The left atrium is moderately dilated. Right atrial size is normal. There  is no color Doppler evidence of an atrial shunt.  4. There is moderate to mod-severe (2-3+) mitral regurgitation.  5. There is mild to moderate (1-2+) tricuspid regurgitation.Right ventricular  systolic pressure is elevated, consistent with moderate pulmonary  hypertension.  6. No pericardial effusion.  7. In direct comparison to the previous study dated 10/30/2023, there has been  an interval increase in the degree of mitral regurgitation. The other findings  are similar.     RHC 5/6/24  RA: 10/9/6 mmHg  RV: 61/11 mmHg  PA: 63/24/38 mmHg  PCWP: Unable to obtain accurate measurement  CO/CI (Goldy): 3.89/2.3 L/min/m2    PA sat: 64%  MAP: 85 mmHg       Right sided filling pressures are normal.    Mild elevated pulmonary hypertension.    Normal cardiac output level.        Device interrogated on 8/30/24  Device: [employer] D533 RESONATE HF ICD  Normal device function.  Mode: DDDR  bpm  AP: 33%  : 3%  Intrinsic rhythm: Afib w/ VS  bpm  Thoracic Impedance: Below reference line, suggests possible intrathoracic fluid accumulation.  Lead Trends Appear Stable.  Estimated battery longevity to RRT = 12.5 years.    Atrial Arrhythmia: AF episode began on 8/28/24  AF Cushman: 11%  Anticoagulant: Eliquis  Ventricular Arrhythmia: None  Setting Changes: Rate response turned ON in device, pacing mode changed to DDDR from DDD, fallback rate during AT/AF episodes 70 bpm.

## 2024-09-08 NOTE — PROGRESS NOTES
"D: Acute exacerbation of CHF (congestive heart failure) (H)  (primary encounter diagnosis)  Acute on chronic systolic congestive heart failure (H)  Pleural effusion  Atrial fibrillation, unspecified type (H)  AICD (automatic cardioverter/defibrillator) present  Personal history of malignant neoplasm of prostate  History of tobacco use     I: Monitored vitals and assessed pt status.   Changed: Unchanged  Running: Heparin running @1150 unit(s)/kg/h and next Xa in AM  PRN: Tylenol prn, Lidocaine patch, and Aqua heating system for back pain  Neuro: A&Ox4, able to verbalize needs, and calls appropriately.   Cardiac: A-Paced, Afebrile, VSS.       Respiratory: Lungs sound clear but diminished, denies cough, dyspnea with exercise present, Kelly>96% on RA  GI/: Active bowel sound, passing flatus, last BM on 9/7 and Voiding spontaneously.   Diet/appetite: Tolerating regular, 2LFR diet. Denies nausea.  Activity: Up independently in the room   Pain: Pain is well managed with prn Tylenol and aqua heating system  Skin: No new deficits noted.  Lines: Piv with heparin running @ 1150, next Xa in AM     I/O this shift:  In: 50 [P.O.:50]  Out: 250 [Emesis/NG output:250]     Temp:  [97.6  F (36.4  C)-98.7  F (37.1  C)] 98.1  F (36.7  C)  Pulse:  [63-66] 64  Resp:  [16-17] 16  BP: (102-116)/(64-73) 114/64  SpO2:  [96 %-100 %] 96 %       P: Continue to monitor Pt status and report changes to treatment team.  Problem: Adult Inpatient Plan of Care  Goal: Plan of Care Review  Description: The Plan of Care Review/Shift note should be completed every shift.  The Outcome Evaluation is a brief statement about your assessment that the patient is improving, declining, or no change.  This information will be displayed automatically on your shift  note.  Outcome: Progressing  Goal: Patient-Specific Goal (Individualized)  Description: You can add care plan individualizations to a care plan. Examples of Individualization might be:  \"Parent requests " "to be called daily at 9am for status\", \"I have a hard time hearing out of my right ear\", or \"Do not touch me to wake me up as it startles  me\".  Outcome: Progressing  Goal: Absence of Hospital-Acquired Illness or Injury  Outcome: Progressing  Intervention: Identify and Manage Fall Risk  Recent Flowsheet Documentation  Taken 9/7/2024 0400 by Rodney Sheehan RN  Safety Promotion/Fall Prevention:   activity supervised   clutter free environment maintained  Taken 9/7/2024 0000 by Rodney Sheehan RN  Safety Promotion/Fall Prevention:   activity supervised   clutter free environment maintained  Intervention: Prevent Skin Injury  Recent Flowsheet Documentation  Taken 9/7/2024 0400 by Rodney Sheehan RN  Body Position: position changed independently  Skin Protection: adhesive use limited  Device Skin Pressure Protection:   absorbent pad utilized/changed   adhesive use limited  Taken 9/7/2024 0000 by Rodney Sheehan RN  Body Position: position changed independently  Skin Protection: adhesive use limited  Device Skin Pressure Protection:   absorbent pad utilized/changed   adhesive use limited  Intervention: Prevent and Manage VTE (Venous Thromboembolism) Risk  Recent Flowsheet Documentation  Taken 9/7/2024 0400 by Rodney Sheehan RN  VTE Prevention/Management: SCDs off (sequential compression devices)  Taken 9/7/2024 0000 by Rodney Sheehan RN  VTE Prevention/Management: SCDs off (sequential compression devices)  Intervention: Prevent Infection  Recent Flowsheet Documentation  Taken 9/7/2024 0400 by Rodney Sheehan RN  Infection Prevention: cohorting utilized  Taken 9/7/2024 0000 by Rodney Sheehan RN  Infection Prevention: cohorting utilized  Goal: Optimal Comfort and Wellbeing  Outcome: Progressing  Intervention: Monitor Pain and Promote Comfort  Recent Flowsheet Documentation  Taken 9/7/2024 0515 by Rodney Sheehan RN  Pain Management Interventions: heat applied  Taken " 9/7/2024 0336 by Rodney Sheehan RN  Pain Management Interventions: heat applied  Taken 9/7/2024 0000 by Rodney Sheehan RN  Pain Management Interventions: heat applied  Taken 9/6/2024 2327 by Rodney Sheehan RN  Pain Management Interventions: declines  Goal: Readiness for Transition of Care  Outcome: Progressing     Problem: Risk for Delirium  Goal: Optimal Coping  Outcome: Progressing  Intervention: Optimize Psychosocial Adjustment to Delirium  Recent Flowsheet Documentation  Taken 9/7/2024 0400 by Rodney Sheehan RN  Supportive Measures: active listening utilized  Taken 9/7/2024 0000 by Rodney Sheehan RN  Supportive Measures: active listening utilized  Goal: Improved Behavioral Control  Outcome: Progressing  Intervention: Prevent and Manage Agitation  Recent Flowsheet Documentation  Taken 9/7/2024 0400 by Rodney Sheehan RN  Environment Familiarity/Consistency: daily routine followed  Taken 9/7/2024 0000 by Rodney Sheehan RN  Environment Familiarity/Consistency: daily routine followed  Intervention: Minimize Safety Risk  Recent Flowsheet Documentation  Taken 9/7/2024 0400 by Rodney Sheehan RN  Communication Enhancement Strategies: call light answered in person  Enhanced Safety Measures: monitor patients coagulation values  Taken 9/7/2024 0000 by oRdney Sheehan RN  Communication Enhancement Strategies: call light answered in person  Enhanced Safety Measures: monitor patients coagulation values  Goal: Improved Attention and Thought Clarity  Outcome: Progressing  Intervention: Maximize Cognitive Function  Recent Flowsheet Documentation  Taken 9/7/2024 0400 by Rodney Sheehan RN  Sensory Stimulation Regulation: care clustered  Reorientation Measures: clock in view  Taken 9/7/2024 0000 by Rodney Sheehan RN  Sensory Stimulation Regulation: care clustered  Reorientation Measures: clock in view  Goal: Improved Sleep  Outcome: Progressing     Problem:  Heart Failure  Goal: Optimal Coping  Outcome: Progressing  Intervention: Support Psychosocial Response  Recent Flowsheet Documentation  Taken 9/7/2024 0400 by Rodney Sheehan RN  Supportive Measures: active listening utilized  Taken 9/7/2024 0000 by Rodney Sheehan RN  Supportive Measures: active listening utilized  Goal: Optimal Cardiac Output  Outcome: Progressing  Intervention: Optimize Cardiac Output  Recent Flowsheet Documentation  Taken 9/7/2024 0400 by Rodney Sheehan RN  Stabilization Measures: legs elevated  Environmental Support: calm environment promoted  Taken 9/7/2024 0000 by Rodney Sheehan RN  Stabilization Measures: legs elevated  Environmental Support: calm environment promoted  Goal: Stable Heart Rate and Rhythm  Outcome: Progressing  Goal: Optimal Functional Ability  Outcome: Progressing  Intervention: Optimize Functional Ability  Recent Flowsheet Documentation  Taken 9/7/2024 0400 by Rodney Sheehan RN  Activity Management:   up ad mackenzie   activity adjusted per tolerance   activity encouraged  Taken 9/7/2024 0000 by Rodney Sheehan RN  Activity Management:   up ad mackenzie   activity adjusted per tolerance   activity encouraged  Goal: Fluid and Electrolyte Balance  Outcome: Progressing  Intervention: Monitor and Manage Fluid and Electrolyte Balance  Recent Flowsheet Documentation  Taken 9/7/2024 0400 by Rodney Sheehan RN  Fluid/Electrolyte Management: fluids provided  Taken 9/7/2024 0000 by Rodney Sheehan RN  Fluid/Electrolyte Management: fluids provided  Goal: Improved Oral Intake  Outcome: Progressing  Intervention: Promote and Optimize Nutrition Intake  Recent Flowsheet Documentation  Taken 9/7/2024 0400 by Rodney Sheehan RN  Oral Nutrition Promotion: physical activity promoted  Taken 9/7/2024 0000 by Rodney Sheehan RN  Oral Nutrition Promotion: physical activity promoted  Goal: Effective Oxygenation and Ventilation  Outcome:  Progressing  Intervention: Promote Airway Secretion Clearance  Recent Flowsheet Documentation  Taken 9/7/2024 0400 by Rodney Sheehan RN  Cough And Deep Breathing: done independently per patient  Activity Management:   up ad mackenzie   activity adjusted per tolerance   activity encouraged  Taken 9/7/2024 0000 by Rodney Sheehan RN  Cough And Deep Breathing: done independently per patient  Activity Management:   up ad mackenzie   activity adjusted per tolerance   activity encouraged  Intervention: Optimize Oxygenation and Ventilation  Recent Flowsheet Documentation  Taken 9/7/2024 0400 by Rodney Sheehan RN  Head of Bed (HOB) Positioning: HOB at 20 degrees  Taken 9/7/2024 0000 by Rodney Sheehan RN  Head of Bed (HOB) Positioning: HOB at 20 degrees  Goal: Effective Breathing Pattern During Sleep  Outcome: Progressing  Intervention: Monitor and Manage Obstructive Sleep Apnea  Recent Flowsheet Documentation  Taken 9/7/2024 0400 by Rodney Sheehan RN  Medication Review/Management: medications reviewed  Taken 9/7/2024 0000 by Rodney Sheehan RN  Medication Review/Management: medications reviewed     Problem: Cardiovascular Surgery  Goal: Improved Activity Tolerance  Outcome: Progressing  Intervention: Optimize Tolerance for Activity  Recent Flowsheet Documentation  Taken 9/7/2024 0400 by Rodney Sheehan RN  Environmental Support: calm environment promoted  Taken 9/7/2024 0000 by Rodney Sheehan RN  Environmental Support: calm environment promoted  Goal: Optimal Coping with Heart Surgery  Outcome: Progressing  Intervention: Support Psychosocial Response to Surgery  Recent Flowsheet Documentation  Taken 9/7/2024 0400 by Rodney Sheehan RN  Supportive Measures: active listening utilized  Taken 9/7/2024 0000 by Rodney Sheehan RN  Supportive Measures: active listening utilized  Goal: Absence of Bleeding  Outcome: Progressing  Goal: Effective Bowel Elimination  Outcome:  Progressing  Goal: Effective Cardiac Function  Outcome: Progressing  Intervention: Optimize Cardiac Output and Blood Flow  Recent Flowsheet Documentation  Taken 9/7/2024 0400 by Rodney Sheehan RN  Stabilization Measures: legs elevated  Taken 9/7/2024 0000 by Rodney Sheehan RN  Stabilization Measures: legs elevated  Goal: Optimal Cerebral Tissue Perfusion  Outcome: Progressing  Intervention: Protect and Optimize Cerebral Perfusion  Recent Flowsheet Documentation  Taken 9/7/2024 0400 by Rodney Sheehan RN  Sensory Stimulation Regulation: care clustered  Cerebral Perfusion Promotion: blood pressure monitored  Head of Bed (HOB) Positioning: HOB at 20 degrees  Taken 9/7/2024 0000 by Rodney Sheehan RN  Sensory Stimulation Regulation: care clustered  Cerebral Perfusion Promotion: blood pressure monitored  Head of Bed (HOB) Positioning: HOB at 20 degrees  Goal: Fluid and Electrolyte Balance  Outcome: Progressing  Intervention: Monitor and Manage Fluid and Electrolyte Balance  Recent Flowsheet Documentation  Taken 9/7/2024 0400 by Rodney Sheehan RN  Fluid/Electrolyte Management: fluids provided  Taken 9/7/2024 0000 by Rodney Sheehan RN  Fluid/Electrolyte Management: fluids provided  Goal: Blood Glucose Level Within Targeted Range  Outcome: Progressing  Goal: Absence of Infection Signs and Symptoms  Outcome: Progressing  Intervention: Prevent or Manage Infection  Recent Flowsheet Documentation  Taken 9/7/2024 0400 by Rodney Sheehan RN  Infection Prevention: cohorting utilized  Taken 9/7/2024 0000 by Rodney Sheehan RN  Infection Prevention: cohorting utilized  Goal: Anesthesia/Sedation Recovery  Outcome: Progressing  Intervention: Optimize Anesthesia Recovery  Recent Flowsheet Documentation  Taken 9/7/2024 0400 by Rodney Sheehan RN  Safety Promotion/Fall Prevention:   activity supervised   clutter free environment maintained  Reorientation Measures: clock in  view  Stabilization Measures: legs elevated  Taken 9/7/2024 0000 by Rodney Sheehan RN  Safety Promotion/Fall Prevention:   activity supervised   clutter free environment maintained  Reorientation Measures: clock in view  Stabilization Measures: legs elevated  Goal: Acceptable Pain Control  Outcome: Progressing  Intervention: Prevent or Manage Pain  Recent Flowsheet Documentation  Taken 9/7/2024 0515 by Rodney Sheehan RN  Pain Management Interventions: heat applied  Taken 9/7/2024 0336 by Rodney Sheehan RN  Pain Management Interventions: heat applied  Taken 9/7/2024 0000 by Rodney Sheehan RN  Pain Management Interventions: heat applied  Taken 9/6/2024 2327 by Rodney Sheehan RN  Pain Management Interventions: declines  Goal: Nausea and Vomiting Relief  Outcome: Progressing  Intervention: Prevent or Manage Nausea and Vomiting  Recent Flowsheet Documentation  Taken 9/7/2024 0400 by Rodney Sheehan RN  Nausea/Vomiting Interventions: (denies) other (see comments)  Taken 9/7/2024 0000 by Rodney Sheehan RN  Nausea/Vomiting Interventions: (denies) other (see comments)  Goal: Effective Urinary Elimination  Outcome: Progressing  Goal: Effective Oxygenation and Ventilation  Outcome: Progressing  Intervention: Promote Airway Secretion Clearance  Recent Flowsheet Documentation  Taken 9/7/2024 0400 by Rodney Sheehan RN  Cough And Deep Breathing: done independently per patient  Taken 9/7/2024 0000 by Rodney Sheehan RN  Cough And Deep Breathing: done independently per patient   Goal Outcome Evaluation: Progressing

## 2024-09-09 ENCOUNTER — APPOINTMENT (OUTPATIENT)
Dept: OCCUPATIONAL THERAPY | Facility: CLINIC | Age: 74
DRG: 001 | End: 2024-09-09
Attending: STUDENT IN AN ORGANIZED HEALTH CARE EDUCATION/TRAINING PROGRAM
Payer: MEDICARE

## 2024-09-09 ENCOUNTER — ANCILLARY PROCEDURE (OUTPATIENT)
Dept: CARDIOLOGY | Facility: CLINIC | Age: 74
DRG: 001 | End: 2024-09-09
Attending: INTERNAL MEDICINE
Payer: MEDICARE

## 2024-09-09 ENCOUNTER — PATIENT OUTREACH (OUTPATIENT)
Dept: CARE COORDINATION | Facility: CLINIC | Age: 74
End: 2024-09-09
Payer: MEDICARE

## 2024-09-09 ENCOUNTER — APPOINTMENT (OUTPATIENT)
Dept: MRI IMAGING | Facility: CLINIC | Age: 74
DRG: 001 | End: 2024-09-09
Attending: STUDENT IN AN ORGANIZED HEALTH CARE EDUCATION/TRAINING PROGRAM
Payer: MEDICARE

## 2024-09-09 DIAGNOSIS — I50.23 ACUTE ON CHRONIC SYSTOLIC CONGESTIVE HEART FAILURE (H): ICD-10-CM

## 2024-09-09 DIAGNOSIS — I50.23 ACUTE ON CHRONIC SYSTOLIC CONGESTIVE HEART FAILURE (H): Primary | ICD-10-CM

## 2024-09-09 LAB
ALBUMIN SERPL BCG-MCNC: 3.1 G/DL (ref 3.5–5.2)
ALP SERPL-CCNC: 94 U/L (ref 40–150)
ALT SERPL W P-5'-P-CCNC: 33 U/L (ref 0–70)
ANION GAP SERPL CALCULATED.3IONS-SCNC: 8 MMOL/L (ref 7–15)
AST SERPL W P-5'-P-CCNC: 35 U/L (ref 0–45)
BASOPHILS # BLD AUTO: 0 10E3/UL (ref 0–0.2)
BASOPHILS NFR BLD AUTO: 0 %
BILIRUB SERPL-MCNC: 0.3 MG/DL
BUN SERPL-MCNC: 36.4 MG/DL (ref 8–23)
CALCIUM SERPL-MCNC: 8.7 MG/DL (ref 8.8–10.4)
CHLORIDE SERPL-SCNC: 101 MMOL/L (ref 98–107)
CREAT SERPL-MCNC: 0.88 MG/DL (ref 0.67–1.17)
EGFRCR SERPLBLD CKD-EPI 2021: 90 ML/MIN/1.73M2
EOSINOPHIL # BLD AUTO: 0 10E3/UL (ref 0–0.7)
EOSINOPHIL NFR BLD AUTO: 0 %
ERYTHROCYTE [DISTWIDTH] IN BLOOD BY AUTOMATED COUNT: 16.4 % (ref 10–15)
GLUCOSE SERPL-MCNC: 113 MG/DL (ref 70–99)
HCO3 SERPL-SCNC: 26 MMOL/L (ref 22–29)
HCT VFR BLD AUTO: 29.6 % (ref 40–53)
HGB BLD-MCNC: 9.5 G/DL (ref 13.3–17.7)
IMM GRANULOCYTES # BLD: 0.2 10E3/UL
IMM GRANULOCYTES NFR BLD: 2 %
INR PPP: 1.17 (ref 0.85–1.15)
LYMPHOCYTES # BLD AUTO: 0.8 10E3/UL (ref 0.8–5.3)
LYMPHOCYTES NFR BLD AUTO: 6 %
MAGNESIUM SERPL-MCNC: 2 MG/DL (ref 1.7–2.3)
MCH RBC QN AUTO: 31 PG (ref 26.5–33)
MCHC RBC AUTO-ENTMCNC: 32.1 G/DL (ref 31.5–36.5)
MCV RBC AUTO: 97 FL (ref 78–100)
MONOCYTES # BLD AUTO: 0.3 10E3/UL (ref 0–1.3)
MONOCYTES NFR BLD AUTO: 2 %
NEUTROPHILS # BLD AUTO: 10.5 10E3/UL (ref 1.6–8.3)
NEUTROPHILS NFR BLD AUTO: 90 %
NRBC # BLD AUTO: 0 10E3/UL
NRBC BLD AUTO-RTO: 0 /100
PLATELET # BLD AUTO: 320 10E3/UL (ref 150–450)
POTASSIUM SERPL-SCNC: 4.5 MMOL/L (ref 3.4–5.3)
PREALB SERPL-MCNC: 13.6 MG/DL (ref 20–40)
PROT SERPL-MCNC: 6.7 G/DL (ref 6.4–8.3)
RBC # BLD AUTO: 3.06 10E6/UL (ref 4.4–5.9)
SODIUM SERPL-SCNC: 135 MMOL/L (ref 135–145)
UFH PPP CHRO-ACNC: 0.39 IU/ML
WBC # BLD AUTO: 11.7 10E3/UL (ref 4–11)

## 2024-09-09 PROCEDURE — 94060 EVALUATION OF WHEEZING: CPT

## 2024-09-09 PROCEDURE — 93287 PERI-PX DEVICE EVAL & PRGR: CPT

## 2024-09-09 PROCEDURE — 85610 PROTHROMBIN TIME: CPT

## 2024-09-09 PROCEDURE — 120N000003 HC R&B IMCU UMMC

## 2024-09-09 PROCEDURE — 80053 COMPREHEN METABOLIC PANEL: CPT

## 2024-09-09 PROCEDURE — 85520 HEPARIN ASSAY: CPT | Performed by: INTERNAL MEDICINE

## 2024-09-09 PROCEDURE — 99233 SBSQ HOSP IP/OBS HIGH 50: CPT | Mod: 25 | Performed by: INTERNAL MEDICINE

## 2024-09-09 PROCEDURE — 250N000011 HC RX IP 250 OP 636

## 2024-09-09 PROCEDURE — 250N000013 HC RX MED GY IP 250 OP 250 PS 637

## 2024-09-09 PROCEDURE — 75561 CARDIAC MRI FOR MORPH W/DYE: CPT | Mod: 26 | Performed by: RADIOLOGY

## 2024-09-09 PROCEDURE — 83735 ASSAY OF MAGNESIUM: CPT

## 2024-09-09 PROCEDURE — A9585 GADOBUTROL INJECTION: HCPCS | Performed by: STUDENT IN AN ORGANIZED HEALTH CARE EDUCATION/TRAINING PROGRAM

## 2024-09-09 PROCEDURE — 93283 PRGRMG EVAL IMPLANTABLE DFB: CPT

## 2024-09-09 PROCEDURE — 93287 PERI-PX DEVICE EVAL & PRGR: CPT | Mod: 26 | Performed by: INTERNAL MEDICINE

## 2024-09-09 PROCEDURE — 999N000157 HC STATISTIC RCP TIME EA 10 MIN

## 2024-09-09 PROCEDURE — 94618 PULMONARY STRESS TESTING: CPT

## 2024-09-09 PROCEDURE — G1010 CDSM STANSON: HCPCS

## 2024-09-09 PROCEDURE — G1010 CDSM STANSON: HCPCS | Performed by: RADIOLOGY

## 2024-09-09 PROCEDURE — 255N000002 HC RX 255 OP 636: Performed by: STUDENT IN AN ORGANIZED HEALTH CARE EDUCATION/TRAINING PROGRAM

## 2024-09-09 PROCEDURE — 36415 COLL VENOUS BLD VENIPUNCTURE: CPT

## 2024-09-09 PROCEDURE — 85025 COMPLETE CBC W/AUTO DIFF WBC: CPT

## 2024-09-09 PROCEDURE — 84134 ASSAY OF PREALBUMIN: CPT | Performed by: NURSE PRACTITIONER

## 2024-09-09 RX ORDER — GADOBUTROL 604.72 MG/ML
7.5 INJECTION INTRAVENOUS ONCE
Status: COMPLETED | OUTPATIENT
Start: 2024-09-09 | End: 2024-09-09

## 2024-09-09 RX ADMIN — Medication 6.25 MG: at 18:04

## 2024-09-09 RX ADMIN — OXYCODONE HYDROCHLORIDE AND ACETAMINOPHEN 1000 MG: 500 TABLET ORAL at 08:53

## 2024-09-09 RX ADMIN — ESCITALOPRAM OXALATE 10 MG: 10 TABLET ORAL at 08:53

## 2024-09-09 RX ADMIN — ACETAMINOPHEN 650 MG: 325 TABLET ORAL at 01:20

## 2024-09-09 RX ADMIN — MAGNESIUM OXIDE TAB 400 MG (241.3 MG ELEMENTAL MG) 400 MG: 400 (241.3 MG) TAB at 08:53

## 2024-09-09 RX ADMIN — Medication 1 TABLET: at 08:53

## 2024-09-09 RX ADMIN — DIGOXIN 125 MCG: 125 TABLET ORAL at 08:53

## 2024-09-09 RX ADMIN — POTASSIUM CHLORIDE 20 MEQ: 750 TABLET, EXTENDED RELEASE ORAL at 08:53

## 2024-09-09 RX ADMIN — Medication 5 MG: at 20:59

## 2024-09-09 RX ADMIN — Medication 1 TABLET: at 20:50

## 2024-09-09 RX ADMIN — GABAPENTIN 100 MG: 100 CAPSULE ORAL at 23:06

## 2024-09-09 RX ADMIN — FUROSEMIDE 40 MG: 40 TABLET ORAL at 08:53

## 2024-09-09 RX ADMIN — AMIODARONE HYDROCHLORIDE 200 MG: 200 TABLET ORAL at 08:53

## 2024-09-09 RX ADMIN — Medication 6.25 MG: at 08:53

## 2024-09-09 RX ADMIN — ACETAMINOPHEN 650 MG: 325 TABLET ORAL at 05:50

## 2024-09-09 RX ADMIN — ACETAMINOPHEN 650 MG: 325 TABLET ORAL at 20:50

## 2024-09-09 RX ADMIN — HEPARIN SODIUM 1150 UNITS/HR: 10000 INJECTION, SOLUTION INTRAVENOUS at 10:58

## 2024-09-09 RX ADMIN — CLOPIDOGREL BISULFATE 75 MG: 75 TABLET ORAL at 08:53

## 2024-09-09 RX ADMIN — Medication 6.25 MG: at 13:47

## 2024-09-09 RX ADMIN — ATORVASTATIN CALCIUM 80 MG: 80 TABLET, FILM COATED ORAL at 08:53

## 2024-09-09 RX ADMIN — GADOBUTROL 7.5 ML: 604.72 INJECTION INTRAVENOUS at 11:46

## 2024-09-09 RX ADMIN — METHOCARBAMOL 500 MG: 500 TABLET ORAL at 01:20

## 2024-09-09 RX ADMIN — METHOCARBAMOL 500 MG: 500 TABLET ORAL at 20:50

## 2024-09-09 ASSESSMENT — ACTIVITIES OF DAILY LIVING (ADL)
ADLS_ACUITY_SCORE: 26

## 2024-09-09 NOTE — PROGRESS NOTES
Calorie Count  Intake recorded for: 9/8  Total Kcals: 564 Total Protein: 41g  Kcals from Hospital Food: 564   Protein: 41g  Kcals from Outside Food (average): 0  Protein: 0g  # Meals Ordered from Kitchen: 3 meals  # Meals Recorded: 1 meal   Meal 1: 100% scrambled eggs, vanilla yogurt  # Supplements Recorded: 100% 1 Ensure Enlive   Note: per epic food report, at 11am pt consumed all scrambled eggs, one yogurt, and all of the Ensure Enlive.

## 2024-09-09 NOTE — PLAN OF CARE
"SIX MINUTE WALK TEST  Cardiac [facility]hab  9/9/2024    Duane C Johnson MRN# [id number]   YOB: 1950 Age: 74 year old     Height: 5' 5.984\"  Weight (lbs): 138 lbs 9.6 oz Weight (kg): 62.9 kg (actual weight)    Supplemental oxygen during the test: No    Oximetry: Finger probe    Gait Aid: IV pole     Pre-test Post-test   Time 1410 1421   Blood Pressure (mm Hg) 131/81 121/69   Heart rate (bpm) 78 87   Rated Perceived Dyspnea ([place] Scale) 1 6   Rated Perceived Exertion (R[place] Scale) 1 4    SpO2 (%) 98% 93%     Total distance walked in 6 minutes: 1200 feet    Paused during test? No    Stopped test before 6 minutes? No    Did the patient experience any pain or discomfort during the test? No    Oxygen Titration Required: No, pt not on oxygen during test    Performing Staff: OLIVIA Abraham       "

## 2024-09-09 NOTE — PLAN OF CARE
Shift Events:   MRI complete. 6 minute walk test done with PT. Up walking hallway with SBA multiple times. Calorie counts continue. Needs encouragement with eating but then pt eats well. Friend dropped off mattress pad to help pt with sleeping.     For vital signs and complete assessments, please see documentation flowsheets.     Goal Outcome Evaluation:  Plan of Care Reviewed With: patient  Overall Patient Progress: no change  Overall Patient Progress: no change

## 2024-09-09 NOTE — PROGRESS NOTES
[facility]  Cardiology II Service / Advanced Heart Failure  Daily Progress Note    Patient: Duane C Johnson      : 1950      MRN: [id number]    Assessment/Plan:   Duane C Johnson is a 74 year old male pmhx of anterior STEMI s/p PCI to the pLAD on 10/26/23 with a VT/VF arrest the next day (10/27) with patent stents and unchanged anatomy on repeat angiogram. Placed on amiodarone and discharged 23, ICD was not indicated as this was within 48h of his MI. His EF prior to discharge was 20-30% with a large area of akinesis in the LAD, placed on apixaban due to this (Apixaban dcd on 24). Has had multiple admissions for HF exacerbation last year.  Admitted on 24. He presents for 2 weeks of worsening fatigue; found to have BNP of 16k and L pleural effusion. Admitted for diuresis and RHC. CPX and RHC showed significant functional limitations due to heart failue. Plan for DT VAD while inpatient.     Today's changes:   - Worse back pain last night. Diclofenac cream ordered and PT consulted for TENS eval.    - Continue Lasix 40mg PO   - Continuing Captopril at 6.25 TID. Tolerating this dose without dizziness   - Continue Digoxin 125mcg   - Ongoing evaluation for LVAD  >  Reached out to Dental Clinic coordinator (Anne) to determine when he will have surgeries.   > Optimizing nutritional status. Wife has brought home protein powder and shakes. Kcal counts remain low, not sure if accurate. Will reach out to dietician to see if any additional optimization can be made.    > cMRI today   - Neuropsych inpatient eval    # Acute heart failure exacerbation  #Possible ambulatory shock  # HFrEf 2/2 ICM (EF 15-20% by TTE 24)  Fluid status: Euvolemic  ACEi/ARB/ARNi/afterload reduction: Captopril 6.25 mg TID, hold for SBP < 90  BB: Hold metoprolol due to reduced CO and borderline BP  Aldosterone antagonist: unable to start d/t hypotension   SGLT2i: Empagliflozin 10mg  (held on 9/8 for possible surgeries)  SCD prophylaxis: s/p ICD 5/2024  NSAID use: contraindicated  Antiarrhythmic: Amiodarone 200mg   Diuretic: Started 40 mg daily Lasix for maintenance on 9/5    Echo from 8/31 with EF 15-20% and trace MR. See below for full report.    RHC from 9/3 showing normal R sided pressures, mildly increased L sided pressures, reduced CO. See below for full report.  BP as low as 84/62 (MAP 69), has remained asymptomatic other than fatigue and mild SOB.    - BNP of 16k on admission with L sided pleural effusion   - Continuing to hold Metoprolol   - Lasix 40 mg daily for maintenance, started 9/5   - Low intensity heparin gtt     - Continue Captopril 6.25 TID    - Continue Digoxin 125mcg    - cMRI as below for LVAD eval    #AFib  # h/o VT/VF arrest in 2023  # ICD placed on 5/8/24  Was on apixaban for low EF (akinesis of the mid-distal anterior,mid anteroseptum, distal septum and the apex) and questionable history of Afib. Since there was no episode of Afib captured apixaban was stopped on 7/5/24. Device interrogation showing AF episode started on 8/28/24. Converted to A-paced rhythm after cardioversion on 9/5. Back in AF on 9/7.   - PTA Amio 200, Magnesium 400, Potassium 20  - Holding PTA Metoprolol   - Mag and Potassium replacement per nursing protocol  - Had successful cardioversion on 9/5   > Back in Afib on 9/7. Maintaining regular rates and asymptomatic    #LVAD evaluation   Pt has received financial clearance and LVAD evaluation is proceeding.  - Dental evaluation shows carries in all remaining teeth. Will need extraction   > Awaiting schedule for extractions  - Urology consulted for history of prostate cancer. Has completed therapy and last PSA was undetectable. This is not an obstacle to LVAD  - Abd US without evidence of chronic liver disease.   - No significant carotid stenosis   - ABIs normal   - Inpatient neuropsych eval requested (not absolutely necessary)   - Optimizing nutritional  status   - cMRI to definitively rule out thrombus given recent afib    # CAD s/p PCI to LAD  # STEMI  Anterior STEMI on 10/26/23 with a VT/VF arrest the next day. Repeat angio showed patent stents and unchanged anatomy.   - PTA plavix, Atorvastatin 80    #DOMENICA - Resolved  Baseline Cr normal at ~1. Cr of 1.55 on  with this acute episode of HF. Concern for type 1 cardiorenal syndrome. uPCR negative.   - Diuresis as above  - avoid nephrotoxins    # Moderate malnutrition  - Nutrition consulted to help manage  - Encourage high protein drinks     ================================  Chronic Problems:    #Prostate Cancer  Diagnosed in 2023. Underwent Leuprolide on 3/19/24 and 24. Completed radiation treatment on 24   - Consider testosterone level as outpatient to assess for recovery after ADT    #L inguinal hernia  Pt reports this has been present for some time. Not causing any pain. Not a surgical candidate with current HF status so will not consult surgery at this time.     Hospital Checklist:  DVT Prophylaxis: Heparin gtt  Code Status: Full Code  Bowel regimen: PRN  Diet/Nutrition: 2L fluid and 2G sodium restriction.   Lines: Left PIV    Disposition Plannin+ days for LVAD evaluation    Staffed w/ Dr. Cruz.    Cristobal Fisher MD  PGY1, Internal Medicine  Cardiology 2 Service     2024  ==================================    Subjective:   No acute events overnight. Pt states that he slept horribly last night, the worst since he was admitted. Was having significant mid and lower back pain. Got some Robaxin and Icy-Hot patches but doesn't feel like that did anything. Is concerned about going through the LVAD process and being stuck in bed for multiple days/weeks if he is unable to be comfortable in bed or get good sleep. He remains nontender on exam. Will pursue additional comfort and pain management strategies.     Attempted to reach out to his wife multiple times to see if there are any home  cushions or other comfort things that he may use at home that could be brought in but was unable to reach her.   Objective:     Vitals:    09/06/24 0443 09/08/24 0400 09/09/24 0415   Weight: 61.7 kg (136 lb 1.6 oz) 62 kg (136 lb 11.2 oz) 62.9 kg (138 lb 9.6 oz)     Vital Signs with Ranges  Temp:  [97.7  F (36.5  C)-98.7  F (37.1  C)] 97.7  F (36.5  C)  Pulse:  [71-84] 84  Resp:  [16] 16  BP: (100-128)/(63-87) 120/87  SpO2:  [95 %-98 %] 98 %  I/O last 3 completed shifts:  In: 1157 [P.O.:720; I.V.:437]  Out: 1100 [Urine:1100]    Exam:  General: No acute distress. Sitting up in chair.   HEENT: Normocephalic, atraumatic  CV: Irregular rhythm, regular rate, no murmurs   Lungs: On RA, no crackles. No wheezes, no increased WOB  Abd: Soft, non-tender, non-distended  Back: No midline or paraspinal tenderness.  Ext: Warm and well-perfused, no lower extremity edema  Neuro: Awake, alert, conversational, moving all extremities spontaneously throughout examination    Medications   Current Facility-Administered Medications   Medication Dose Route Frequency Provider Last Rate Last Admin    heparin 25,000 units in 0.45% NaCl 250 mL ANTICOAGULANT infusion  0-5,000 Units/hr Intravenous Continuous Cristobal Fisher MD 11.5 mL/hr at 09/09/24 0800 1,150 Units/hr at 09/09/24 0800    May take oral meds with a sip of water, the morning of Cardioversion procedure.       Does not apply Continuous PRN Vladimir Johnson MD         Current Facility-Administered Medications   Medication Dose Route Frequency Provider Last Rate Last Admin    amiodarone (PACERONE) tablet 200 mg  200 mg Oral Daily Cristobal Fisher MD   200 mg at 09/09/24 0853    atorvastatin (LIPITOR) tablet 80 mg  80 mg Oral Daily Cristobal Fisher MD   80 mg at 09/09/24 0853    captopril (CAPOTEN) half-tab 6.25 mg  6.25 mg Oral TID AC Cristobal Fisher MD   6.25 mg at 09/09/24 0853    clopidogrel (PLAVIX) tablet 75 mg  75 mg Oral Daily Cristobal Fisher MD   75 mg at 09/09/24 0853     digoxin (LANOXIN) tablet 125 mcg  125 mcg Oral Daily Danial Fofana MD   125 mcg at 09/09/24 0853    [Held by provider] empagliflozin (JARDIANCE) tablet 10 mg  10 mg Oral Daily Cristobal Fisher MD   10 mg at 09/08/24 0907    escitalopram (LEXAPRO) tablet 10 mg  10 mg Oral Daily Cristobal Fisher MD   10 mg at 09/09/24 0853    furosemide (LASIX) tablet 40 mg  40 mg Oral Daily Danial Fofana MD   40 mg at 09/09/24 0853    gabapentin (NEURONTIN) capsule 100 mg  100 mg Oral At Bedtime Cristobal Fisher MD   100 mg at 09/08/24 2017    Lidocaine (LIDOCARE) 4 % Patch 1 patch  1 patch Transdermal Q24h Danial Fofana MD   1 patch at 09/07/24 2000    magnesium oxide (MAG-OX) tablet 400 mg  400 mg Oral Daily Cristobal Fisher MD   400 mg at 09/09/24 0853    multivitamin w/minerals (THERA-VIT-M) tablet 1 tablet  1 tablet Oral BID Cristobal Fisher MD   1 tablet at 09/09/24 0853    potassium chloride tray ER (KLOR-CON M10) CR tablet 20 mEq  20 mEq Oral Daily Cristobal Fisher MD   20 mEq at 09/09/24 0853    sodium chloride (PF) 0.9% PF flush 10 mL  10 mL Intravenous Once Vladimir Johnson MD        sodium chloride (PF) 0.9% PF flush 3 mL  3 mL Intravenous Q8H Vladimir Johnson MD        vitamin C (ASCORBIC ACID) tablet 1,000 mg  1,000 mg Oral Daily Cristobal Fisher MD   1,000 mg at 09/09/24 0853       Data   Recent Labs   Lab 09/09/24  0545 09/08/24  0536 09/07/24  0628   WBC 11.7* 15.8* 12.0*   HGB 9.5* 9.8* 9.7*   MCV 97 98 97    331 324   INR 1.17* 1.26* 1.28*    137 135   POTASSIUM 4.5 4.3 4.5   CHLORIDE 101 104 102   CO2 26 24 22   BUN 36.4* 31.7* 30.8*   CR 0.88 0.89 0.99   ANIONGAP 8 9 11   PRANAV 8.7* 8.6* 8.4*   * 130* 118*   ALBUMIN 3.1* 2.9* 2.8*   PROTTOTAL 6.7 6.3* 6.4   BILITOTAL 0.3 0.3 0.3   ALKPHOS 94 94 89   ALT 33 27 23   AST 35 34 32     RHC 9/3/24    RA: 11/11/9  RV:39/9  PA:39/16/26  PCWP:16/22/15    Pa Sat:48.2%  Goldy; CO/CI: 2.91/1.71  Thermodilution; CO/CI:3.53/2.08   Right sided filling pressures  are normal.   Left sided filling pressures are mildly elevated. Mild elevated pulmonary hypertension.   Reduced cardiac output level.         Echocardiogram Complete   Result Value    LVEF  15-20% (severely reduced)    [facility]    [id number]  [facility]  [id number]  [id number]^ROXANA^SONIA     [facility],[place]  Echocardiography Laboratory  [address] [place] [address]     Name: JOHNSON, DUANE C  MRN: [id number]  : 1950  Study Date: 2024 08:20 AM  Age: 74 yrs  Gender: Male  Patient Location: [facility]  Reason For Study: CHF  Ordering Physician: SONIA SCHMIDT  Performed By: Yessenia Cano RDCS     BSA: 1.7 m2  Height: 66 in  Weight: 145 lb  HR: 71  BP: 94/73 mmHg  ______________________________________________________________________________  Procedure  Complete Portable Echo Adult. Contrast Optison. Optison (NDC #4087-6796-60)  given intravenously. Patient was given 6 ml mixture of 3 ml Optison and 6 ml  saline. 3 ml wasted.  ______________________________________________________________________________  Interpretation Summary  Left ventricular function is decreased. The ejection fraction is 15-20%  (severely reduced).  Moderate left ventricular dilation is present.  Apical wall akinesis is present.  Severe diffuse hypokinesis is present.  There is no thrombus seen in the left ventricle.  The right ventricle is normal size.  Global right ventricular function is normal.  Mild functional mitral insufficiency is present.  Mild to moderate tricuspid functional insufficiency is present.  Pulmonary hypertension is present.The right ventricular systolic pressure is  40mmHg above the right atrial pressure.  IVC diameter and respiratory changes fall into an intermediate range  suggesting an RA pressure of 8 mmHg.     This study was compared with the study from 2024 .MR, TR improved. LVEF  similar in both studies compared side to side. So overall no change in  LVEF  ______________________________________________________________________________  Left Ventricle  Left ventricular wall thickness is normal. Moderate left ventricular dilation  is present. Left ventricular diastolic function is not assessable. Left  ventricular function is decreased. The ejection fraction is 15-20% (severely  reduced). Apical wall akinesis is present. Severe diffuse hypokinesis is  present. There is no thrombus seen in the left ventricle.     Right Ventricle  The right ventricle is normal size. Global right ventricular function is  normal. A pacemaker lead is noted in the right ventricle.     Atria  Moderate left atrial enlargement is present. Moderate right atrial enlargement  is present.     Mitral Valve  Mild mitral insufficiency is present.     Aortic Valve  Trileaflet aortic sclerosis without stenosis. On Doppler interrogation, there  is no significant stenosis or regurgitation.     Tricuspid Valve  Mild to moderate tricuspid insufficiency is present. The right ventricular  systolic pressure is approximated at 39.7 mmHg plus the right atrial pressure.  Pulmonary hypertension is present. The right ventricular systolic pressure is  40mmHg above the right atrial pressure.     Pulmonic Valve  On Doppler interrogation, there is no significant stenosis or regurgitation.     Vessels  The aorta root is normal. IVC diameter and respiratory changes fall into an  intermediate range suggesting an RA pressure of 8 mmHg.     Pericardium  No pericardial effusion is present.     Compared to Previous Study  This study was compared with the study from 2/19/2024 . MR, TR improved. LVEF  similar in both studies compared side to side. So overall no change in LVEF.  ______________________________________________________________________________  MMode/2D Measurements & Calculations     IVSd: 0.89 cm  LVIDd: 6.0 cm  LVIDs: 5.5 cm  LVPWd: 0.79 cm  FS: 7.1 %  LV mass(C)d: 197.0 grams  LV mass(C)dI: 112.9  grams/m2  LVOT diam: 2.0 cm  LVOT area: 3.2 cm2  EF Biplane: 16.8 %  LA Volume (BP): 71.3 ml  LA Volume Index (BP): 41.0 ml/m2  RV Base: 4.6 cm  RWT: 0.27     TAPSE: 1.0 cm     Doppler Measurements & Calculations  MV E max ivan: 82.7 cm/sec  LV V1 max P.9 mmHg  LV V1 max: 84.2 cm/sec  LV V1 VTI: 13.8 cm  MR PISA: 4.2 cm2  MR ERO: 0.33 cm2  MR volume: 35.7 ml  SV(LVOT): 43.8 ml  SI(LVOT): 25.1 ml/m2  PA acc time: 0.08 sec  TR max ivan: 315.1 cm/sec  TR max P.7 mmHg  RV S Ivan: 10.7 cm/sec     ______________________________________________________________________________  Report approved by: Jeanine MEJIA 2024 11:05 AM            Examination: XR CHEST 2 VIEWS 2024 3:06 PM     Indication: Shortness of breath     Comparison: Radiograph 2024. CT 2023.     Findings:  PA and lateral views of the chest. Left chest wall implantable cardiac  defibrillator with grossly intact leads/shock coil. Coronary stenting.  Trachea is midline. Stable mild cardiomegaly. Mild blunting of the  left costophrenic angle, likely representing small pleural effusion.  No pneumothorax. No focal consolidation or any definite abnormal  airspace opacities. No acute or suspicious osseous abnormality.  Unremarkable visualized abdomen.      Impression   Impression:   1. Stable mild cardiomegaly with implantable cardiac defibrillator and  coronary stenting.  2. Small left pleural effusion.     I have personally reviewed the examination and initial interpretation  and I agree with the findings.     PANCHITO EID MD         SYSTEM ID:  [id number]      2023 CMR  Clinical history: 73 year old with anterior STEMI, cardiac arrest in Oct 2023  Comparison CMR: none  1. The left ventricle is severely enlarged. There is wall thinning and akinesis of the mid-distal anterior,  mid anteroseptum, distal septum and the apex  The global systolic function is severely reduced. The LVEF is 28%.  2. The right ventricle is normal in  cavity size. The global systolic function is normal. The RVEF is 52%.   3. The right atrium is normal and the left atrium is severely enlarged.  4. There is mild mitral regurgitation.   5. There is transmural late gadolinium enhancement in mid-distal anterior, mid anteroseptum, distal  septum and the apex consistent with a large infarction in the LAD territory.   6. There is no pericardial effusion.  7. There is no intracardiac thrombus.  8. There are bilateral pleural effusions.  CONCLUSIONS:   Ischemic cardiomyopathy with a large anteroapical infarction.   Severely reduced left ventricular function and normal right ventricular function, LVEF 28% and RVEF 52%.     Cardiac Catheterization:  10/26/23    1st Mrg lesion is 20% stenosed.    Prox LAD to Mid LAD lesion is 100% stenosed.    1st Diag-1 lesion is 80% stenosed.    1st Diag-2 lesion is 50% stenosed.    Dist LAD lesion is 100% stenosed.    RPDA lesion is 70% stenosed.    Dist RCA lesion is 40% stenosed.     Anterior STEMI  Two vessel obstructive CAD (100% pLAD occlusion, 70% rPDA stenosis, 80% diagonal 1 stenosis)  PCI of pLAD to mLAD with BRENDA x 1 (Synergy 2.50x 28 mm) post dilated to 2.75 vessel  CVC placement in RIJ  LVEDP of 26 mmHg  Hemostasis of RRA with TR band      2/19/2024 Echo  Interpretation Summary     1. The left ventricle is moderately dilated. Left ventricular hypertrophy is  noted by two-dimensional echocardiography. Proximal septal thickening is  noted. Left ventricular systolic function is moderate to severely reduced. The  visual ejection fraction is 25-30%. Biplane LVEF is 25%. Diastolic Doppler  findings (E/E' ratio and/or other parameters) suggest left ventricular filling  pressures are increased. There is severe hypokinesis to akinesis of the mid  anterior/anterolateral/anteroseptal/inferoseptal walls and all apical segments  of the left ventricle. There is no thrombus seen in the left ventricle.  2. The right ventricle is normal size.  The right ventricular systolic function  is normal.  3. The left atrium is moderately dilated. Right atrial size is normal. There  is no color Doppler evidence of an atrial shunt.  4. There is moderate to mod-severe (2-3+) mitral regurgitation.  5. There is mild to moderate (1-2+) tricuspid regurgitation.Right ventricular  systolic pressure is elevated, consistent with moderate pulmonary  hypertension.  6. No pericardial effusion.  7. In direct comparison to the previous study dated 10/30/2023, there has been  an interval increase in the degree of mitral regurgitation. The other findings  are similar.     RHC 5/6/24  RA: 10/9/6 mmHg  RV: 61/11 mmHg  PA: 63/24/38 mmHg  PCWP: Unable to obtain accurate measurement  CO/CI (Goldy): 3.89/2.3 L/min/m2    PA sat: 64%  MAP: 85 mmHg       Right sided filling pressures are normal.    Mild elevated pulmonary hypertension.    Normal cardiac output level.        Device interrogated on 8/30/24  Device: [employer]-IT D533 RESONATE HF ICD  Normal device function.  Mode: DDDR  bpm  AP: 33%  : 3%  Intrinsic rhythm: Afib w/ VS  bpm  Thoracic Impedance: Below reference line, suggests possible intrathoracic fluid accumulation.  Lead Trends Appear Stable.  Estimated battery longevity to RRT = 12.5 years.    Atrial Arrhythmia: AF episode began on 8/28/24  AF [place]: 11%  Anticoagulant: Eliquis  Ventricular Arrhythmia: None  Setting Changes: Rate response turned ON in device, pacing mode changed to DDDR from DDD, fallback rate during AT/AF episodes 70 bpm.

## 2024-09-09 NOTE — PROGRESS NOTES
Patient YOB: 1950    Patient age: 74    Patient gender: M    Patient height: 167 cm          Predicted values:     FEV1: 2.43    FVC: 3.11    PEF: 7.12    FEV1/FVC: 78%    Patient values:    FEV1: 1.95    FVC: 2.61    PEF: 4.02    FEV1/FVC: .74          Patient effort:    Good effort

## 2024-09-09 NOTE — PLAN OF CARE
Goal Outcome Evaluation:      Plan of Care Reviewed With: patient    Overall Patient Progress: no changeOverall Patient Progress: no change    Outcome Evaluation: Patient remains on room air with stable blood pressures. Continues to tolerate ambulating in hallways. LVAD workup in progress.    Major shift events: Patient A/Ox4, breathing comfortably on room air at rest with mild dyspnea on exertion. Ambulated in hallways x1 overnight. Remains in afib rates 70-80 with occasional paced beats. Permanent pacer DDDR . MAP > 65. Adequate urine output. No BM. Tolerating regular diet. Added robaxin overnight due to hip pain preventing patient from sleeping without full success. Plan for potential dental visit today. Continue to monitor patient and update team with further changes. See flowsheet for details and assessment.

## 2024-09-09 NOTE — PLAN OF CARE
Neuro: A&Ox4.   Cardiac: A paced w/ underlying afib. VSS.              Respiratory: Sating upper 90s on RA.  GI/: Adequate urine output.   Diet/appetite: Tolerating regular diet. Eating well.  Activity:  SBA, up to chair and in halls w/ IV pole push  Pain: Decreased pain today! Tylenol given for prevention x2.    Skin: No new deficits noted.  LDA's: L PIV infusing therapeutic heparin @ 1150u/hr     Plan: Continue with POC. Notify primary team with changes.      Ileana Norwood, R

## 2024-09-10 ENCOUNTER — TEAM CONFERENCE (OUTPATIENT)
Dept: CARDIOLOGY | Facility: CLINIC | Age: 74
End: 2024-09-10

## 2024-09-10 ENCOUNTER — APPOINTMENT (OUTPATIENT)
Dept: PHYSICAL THERAPY | Facility: CLINIC | Age: 74
DRG: 001 | End: 2024-09-10
Payer: MEDICARE

## 2024-09-10 ENCOUNTER — APPOINTMENT (OUTPATIENT)
Dept: OCCUPATIONAL THERAPY | Facility: CLINIC | Age: 74
DRG: 001 | End: 2024-09-10
Payer: MEDICARE

## 2024-09-10 LAB
ALBUMIN SERPL BCG-MCNC: 2.9 G/DL (ref 3.5–5.2)
ALP SERPL-CCNC: 107 U/L (ref 40–150)
ALT SERPL W P-5'-P-CCNC: 35 U/L (ref 0–70)
ANION GAP SERPL CALCULATED.3IONS-SCNC: 9 MMOL/L (ref 7–15)
AST SERPL W P-5'-P-CCNC: 38 U/L (ref 0–45)
BASOPHILS # BLD AUTO: 0 10E3/UL (ref 0–0.2)
BASOPHILS NFR BLD AUTO: 0 %
BILIRUB SERPL-MCNC: 0.3 MG/DL
BUN SERPL-MCNC: 41.7 MG/DL (ref 8–23)
CALCIUM SERPL-MCNC: 8.7 MG/DL (ref 8.8–10.4)
CHLORIDE SERPL-SCNC: 98 MMOL/L (ref 98–107)
CREAT SERPL-MCNC: 0.88 MG/DL (ref 0.67–1.17)
DIGOXIN SERPL-MCNC: 1.2 NG/ML (ref 0.6–2)
EGFRCR SERPLBLD CKD-EPI 2021: 90 ML/MIN/1.73M2
EOSINOPHIL # BLD AUTO: 0 10E3/UL (ref 0–0.7)
EOSINOPHIL NFR BLD AUTO: 0 %
ERYTHROCYTE [DISTWIDTH] IN BLOOD BY AUTOMATED COUNT: 16.7 % (ref 10–15)
GLUCOSE SERPL-MCNC: 117 MG/DL (ref 70–99)
HCO3 SERPL-SCNC: 26 MMOL/L (ref 22–29)
HCT VFR BLD AUTO: 29.9 % (ref 40–53)
HGB BLD-MCNC: 9.6 G/DL (ref 13.3–17.7)
IMM GRANULOCYTES # BLD: 0.2 10E3/UL
IMM GRANULOCYTES NFR BLD: 1 %
INR PPP: 1.15 (ref 0.85–1.15)
LYMPHOCYTES # BLD AUTO: 0.7 10E3/UL (ref 0.8–5.3)
LYMPHOCYTES NFR BLD AUTO: 7 %
MAGNESIUM SERPL-MCNC: 2 MG/DL (ref 1.7–2.3)
MCH RBC QN AUTO: 31 PG (ref 26.5–33)
MCHC RBC AUTO-ENTMCNC: 32.1 G/DL (ref 31.5–36.5)
MCV RBC AUTO: 97 FL (ref 78–100)
MDC_IDC_LEAD_CONNECTION_STATUS: NORMAL
MDC_IDC_LEAD_IMPLANT_DT: NORMAL
MDC_IDC_LEAD_LOCATION: NORMAL
MDC_IDC_LEAD_LOCATION_DETAIL_1: NORMAL
MDC_IDC_LEAD_MFG: NORMAL
MDC_IDC_LEAD_MODEL: NORMAL
MDC_IDC_LEAD_POLARITY_TYPE: NORMAL
MDC_IDC_LEAD_SERIAL: NORMAL
MDC_IDC_LEAD_SPECIAL_FUNCTION: NORMAL
MDC_IDC_MSMT_BATTERY_DTM: NORMAL
MDC_IDC_MSMT_BATTERY_DTM: NORMAL
MDC_IDC_MSMT_BATTERY_REMAINING_LONGEVITY: 132 MO
MDC_IDC_MSMT_BATTERY_REMAINING_LONGEVITY: 132 MO
MDC_IDC_MSMT_BATTERY_REMAINING_PERCENTAGE: 100 %
MDC_IDC_MSMT_BATTERY_REMAINING_PERCENTAGE: 100 %
MDC_IDC_MSMT_BATTERY_STATUS: NORMAL
MDC_IDC_MSMT_BATTERY_STATUS: NORMAL
MDC_IDC_MSMT_CAP_CHARGE_DTM: NORMAL
MDC_IDC_MSMT_CAP_CHARGE_DTM: NORMAL
MDC_IDC_MSMT_CAP_CHARGE_TIME: 9.4 S
MDC_IDC_MSMT_CAP_CHARGE_TIME: 9.4 S
MDC_IDC_MSMT_CAP_CHARGE_TYPE: NORMAL
MDC_IDC_MSMT_CAP_CHARGE_TYPE: NORMAL
MDC_IDC_MSMT_LEADCHNL_RA_IMPEDANCE_VALUE: 689 OHM
MDC_IDC_MSMT_LEADCHNL_RA_IMPEDANCE_VALUE: 692 OHM
MDC_IDC_MSMT_LEADCHNL_RA_LEAD_CHANNEL_STATUS: NORMAL
MDC_IDC_MSMT_LEADCHNL_RV_IMPEDANCE_VALUE: 338 OHM
MDC_IDC_MSMT_LEADCHNL_RV_IMPEDANCE_VALUE: 343 OHM
MDC_IDC_MSMT_LEADCHNL_RV_PACING_THRESHOLD_AMPLITUDE: 0.8 V
MDC_IDC_MSMT_LEADCHNL_RV_PACING_THRESHOLD_AMPLITUDE: 1 V
MDC_IDC_MSMT_LEADCHNL_RV_PACING_THRESHOLD_PULSEWIDTH: 0.4 MS
MDC_IDC_MSMT_LEADCHNL_RV_PACING_THRESHOLD_PULSEWIDTH: 0.4 MS
MDC_IDC_PG_IMPLANT_DTM: NORMAL
MDC_IDC_PG_IMPLANT_DTM: NORMAL
MDC_IDC_PG_MFG: NORMAL
MDC_IDC_PG_MFG: NORMAL
MDC_IDC_PG_MODEL: NORMAL
MDC_IDC_PG_MODEL: NORMAL
MDC_IDC_PG_SERIAL: NORMAL
MDC_IDC_PG_SERIAL: NORMAL
MDC_IDC_PG_TYPE: NORMAL
MDC_IDC_PG_TYPE: NORMAL
MDC_IDC_SESS_CLINIC_NAME: NORMAL
MDC_IDC_SESS_CLINIC_NAME: NORMAL
MDC_IDC_SESS_DTM: NORMAL
MDC_IDC_SESS_DTM: NORMAL
MDC_IDC_SESS_TYPE: NORMAL
MDC_IDC_SESS_TYPE: NORMAL
MDC_IDC_SET_BRADY_AT_MODE_SWITCH_MODE: NORMAL
MDC_IDC_SET_BRADY_AT_MODE_SWITCH_MODE: NORMAL
MDC_IDC_SET_BRADY_AT_MODE_SWITCH_RATE: 170 {BEATS}/MIN
MDC_IDC_SET_BRADY_AT_MODE_SWITCH_RATE: 170 {BEATS}/MIN
MDC_IDC_SET_BRADY_LOWRATE: 60 {BEATS}/MIN
MDC_IDC_SET_BRADY_LOWRATE: 60 {BEATS}/MIN
MDC_IDC_SET_BRADY_MAX_SENSOR_RATE: 130 {BEATS}/MIN
MDC_IDC_SET_BRADY_MAX_SENSOR_RATE: 130 {BEATS}/MIN
MDC_IDC_SET_BRADY_MAX_TRACKING_RATE: 130 {BEATS}/MIN
MDC_IDC_SET_BRADY_MAX_TRACKING_RATE: 130 {BEATS}/MIN
MDC_IDC_SET_BRADY_MODE: NORMAL
MDC_IDC_SET_BRADY_MODE: NORMAL
MDC_IDC_SET_BRADY_PAV_DELAY_HIGH: 200 MS
MDC_IDC_SET_BRADY_PAV_DELAY_HIGH: 200 MS
MDC_IDC_SET_BRADY_PAV_DELAY_LOW: 300 MS
MDC_IDC_SET_BRADY_PAV_DELAY_LOW: 300 MS
MDC_IDC_SET_BRADY_SAV_DELAY_HIGH: 200 MS
MDC_IDC_SET_BRADY_SAV_DELAY_HIGH: 200 MS
MDC_IDC_SET_BRADY_SAV_DELAY_LOW: 300 MS
MDC_IDC_SET_BRADY_SAV_DELAY_LOW: 300 MS
MDC_IDC_SET_LEADCHNL_RA_PACING_AMPLITUDE: 2 V
MDC_IDC_SET_LEADCHNL_RA_PACING_AMPLITUDE: 2 V
MDC_IDC_SET_LEADCHNL_RA_PACING_CAPTURE_MODE: NORMAL
MDC_IDC_SET_LEADCHNL_RA_PACING_CAPTURE_MODE: NORMAL
MDC_IDC_SET_LEADCHNL_RA_PACING_POLARITY: NORMAL
MDC_IDC_SET_LEADCHNL_RA_PACING_POLARITY: NORMAL
MDC_IDC_SET_LEADCHNL_RA_PACING_PULSEWIDTH: 0.4 MS
MDC_IDC_SET_LEADCHNL_RA_PACING_PULSEWIDTH: 0.4 MS
MDC_IDC_SET_LEADCHNL_RA_SENSING_ADAPTATION_MODE: NORMAL
MDC_IDC_SET_LEADCHNL_RA_SENSING_ADAPTATION_MODE: NORMAL
MDC_IDC_SET_LEADCHNL_RA_SENSING_POLARITY: NORMAL
MDC_IDC_SET_LEADCHNL_RA_SENSING_POLARITY: NORMAL
MDC_IDC_SET_LEADCHNL_RA_SENSING_SENSITIVITY: 0.25 MV
MDC_IDC_SET_LEADCHNL_RA_SENSING_SENSITIVITY: 0.25 MV
MDC_IDC_SET_LEADCHNL_RV_PACING_AMPLITUDE: 3.4 V
MDC_IDC_SET_LEADCHNL_RV_PACING_AMPLITUDE: 3.4 V
MDC_IDC_SET_LEADCHNL_RV_PACING_CAPTURE_MODE: NORMAL
MDC_IDC_SET_LEADCHNL_RV_PACING_CAPTURE_MODE: NORMAL
MDC_IDC_SET_LEADCHNL_RV_PACING_POLARITY: NORMAL
MDC_IDC_SET_LEADCHNL_RV_PACING_POLARITY: NORMAL
MDC_IDC_SET_LEADCHNL_RV_PACING_PULSEWIDTH: 0.4 MS
MDC_IDC_SET_LEADCHNL_RV_PACING_PULSEWIDTH: 0.4 MS
MDC_IDC_SET_LEADCHNL_RV_SENSING_ADAPTATION_MODE: NORMAL
MDC_IDC_SET_LEADCHNL_RV_SENSING_ADAPTATION_MODE: NORMAL
MDC_IDC_SET_LEADCHNL_RV_SENSING_POLARITY: NORMAL
MDC_IDC_SET_LEADCHNL_RV_SENSING_POLARITY: NORMAL
MDC_IDC_SET_LEADCHNL_RV_SENSING_SENSITIVITY: 0.6 MV
MDC_IDC_SET_LEADCHNL_RV_SENSING_SENSITIVITY: 0.6 MV
MDC_IDC_SET_ZONE_DETECTION_INTERVAL: 250 MS
MDC_IDC_SET_ZONE_DETECTION_INTERVAL: 250 MS
MDC_IDC_SET_ZONE_DETECTION_INTERVAL: 300 MS
MDC_IDC_SET_ZONE_DETECTION_INTERVAL: 300 MS
MDC_IDC_SET_ZONE_DETECTION_INTERVAL: 353 MS
MDC_IDC_SET_ZONE_DETECTION_INTERVAL: 353 MS
MDC_IDC_SET_ZONE_STATUS: NORMAL
MDC_IDC_SET_ZONE_TYPE: NORMAL
MDC_IDC_SET_ZONE_VENDOR_TYPE: NORMAL
MDC_IDC_STAT_AT_BURDEN_PERCENT: 76 %
MDC_IDC_STAT_AT_BURDEN_PERCENT: 76 %
MDC_IDC_STAT_AT_DTM_END: NORMAL
MDC_IDC_STAT_AT_DTM_END: NORMAL
MDC_IDC_STAT_AT_DTM_START: NORMAL
MDC_IDC_STAT_AT_DTM_START: NORMAL
MDC_IDC_STAT_BRADY_DTM_END: NORMAL
MDC_IDC_STAT_BRADY_DTM_END: NORMAL
MDC_IDC_STAT_BRADY_DTM_START: NORMAL
MDC_IDC_STAT_BRADY_DTM_START: NORMAL
MDC_IDC_STAT_BRADY_RA_PERCENT_PACED: 1 %
MDC_IDC_STAT_BRADY_RA_PERCENT_PACED: 1 %
MDC_IDC_STAT_BRADY_RV_PERCENT_PACED: 17 %
MDC_IDC_STAT_BRADY_RV_PERCENT_PACED: 17 %
MDC_IDC_STAT_EPISODE_RECENT_COUNT: 0
MDC_IDC_STAT_EPISODE_RECENT_COUNT: 1
MDC_IDC_STAT_EPISODE_RECENT_COUNT_DTM_END: NORMAL
MDC_IDC_STAT_EPISODE_RECENT_COUNT_DTM_START: NORMAL
MDC_IDC_STAT_EPISODE_TYPE: NORMAL
MDC_IDC_STAT_EPISODE_VENDOR_TYPE: NORMAL
MDC_IDC_STAT_TACHYTHERAPY_ATP_DELIVERED_RECENT: 0
MDC_IDC_STAT_TACHYTHERAPY_ATP_DELIVERED_RECENT: 0
MDC_IDC_STAT_TACHYTHERAPY_ATP_DELIVERED_TOTAL: 0
MDC_IDC_STAT_TACHYTHERAPY_ATP_DELIVERED_TOTAL: 0
MDC_IDC_STAT_TACHYTHERAPY_RECENT_DTM_END: NORMAL
MDC_IDC_STAT_TACHYTHERAPY_RECENT_DTM_END: NORMAL
MDC_IDC_STAT_TACHYTHERAPY_RECENT_DTM_START: NORMAL
MDC_IDC_STAT_TACHYTHERAPY_RECENT_DTM_START: NORMAL
MDC_IDC_STAT_TACHYTHERAPY_SHOCKS_ABORTED_RECENT: 0
MDC_IDC_STAT_TACHYTHERAPY_SHOCKS_ABORTED_RECENT: 0
MDC_IDC_STAT_TACHYTHERAPY_SHOCKS_ABORTED_TOTAL: 0
MDC_IDC_STAT_TACHYTHERAPY_SHOCKS_ABORTED_TOTAL: 0
MDC_IDC_STAT_TACHYTHERAPY_SHOCKS_DELIVERED_RECENT: 0
MDC_IDC_STAT_TACHYTHERAPY_SHOCKS_DELIVERED_RECENT: 0
MDC_IDC_STAT_TACHYTHERAPY_SHOCKS_DELIVERED_TOTAL: 0
MDC_IDC_STAT_TACHYTHERAPY_SHOCKS_DELIVERED_TOTAL: 0
MDC_IDC_STAT_TACHYTHERAPY_TOTAL_DTM_END: NORMAL
MDC_IDC_STAT_TACHYTHERAPY_TOTAL_DTM_END: NORMAL
MDC_IDC_STAT_TACHYTHERAPY_TOTAL_DTM_START: NORMAL
MDC_IDC_STAT_TACHYTHERAPY_TOTAL_DTM_START: NORMAL
MONOCYTES # BLD AUTO: 0.3 10E3/UL (ref 0–1.3)
MONOCYTES NFR BLD AUTO: 3 %
NEUTROPHILS # BLD AUTO: 9.3 10E3/UL (ref 1.6–8.3)
NEUTROPHILS NFR BLD AUTO: 89 %
NRBC # BLD AUTO: 0 10E3/UL
NRBC BLD AUTO-RTO: 0 /100
PLATELET # BLD AUTO: 303 10E3/UL (ref 150–450)
POTASSIUM SERPL-SCNC: 4.5 MMOL/L (ref 3.4–5.3)
PROT SERPL-MCNC: 6.5 G/DL (ref 6.4–8.3)
RBC # BLD AUTO: 3.1 10E6/UL (ref 4.4–5.9)
SODIUM SERPL-SCNC: 133 MMOL/L (ref 135–145)
UFH PPP CHRO-ACNC: 0.24 IU/ML
UFH PPP CHRO-ACNC: <0.1 IU/ML
WBC # BLD AUTO: 10.5 10E3/UL (ref 4–11)

## 2024-09-10 PROCEDURE — 83735 ASSAY OF MAGNESIUM: CPT

## 2024-09-10 PROCEDURE — 80162 ASSAY OF DIGOXIN TOTAL: CPT

## 2024-09-10 PROCEDURE — 250N000013 HC RX MED GY IP 250 OP 250 PS 637

## 2024-09-10 PROCEDURE — 85520 HEPARIN ASSAY: CPT

## 2024-09-10 PROCEDURE — 99233 SBSQ HOSP IP/OBS HIGH 50: CPT | Mod: GC | Performed by: INTERNAL MEDICINE

## 2024-09-10 PROCEDURE — 97530 THERAPEUTIC ACTIVITIES: CPT | Mod: GP | Performed by: PHYSICAL THERAPIST

## 2024-09-10 PROCEDURE — 97110 THERAPEUTIC EXERCISES: CPT | Mod: GP | Performed by: PHYSICAL THERAPIST

## 2024-09-10 PROCEDURE — 250N000011 HC RX IP 250 OP 636

## 2024-09-10 PROCEDURE — 85610 PROTHROMBIN TIME: CPT

## 2024-09-10 PROCEDURE — 36415 COLL VENOUS BLD VENIPUNCTURE: CPT

## 2024-09-10 PROCEDURE — 80053 COMPREHEN METABOLIC PANEL: CPT

## 2024-09-10 PROCEDURE — 97116 GAIT TRAINING THERAPY: CPT | Mod: GP | Performed by: PHYSICAL THERAPIST

## 2024-09-10 PROCEDURE — 97530 THERAPEUTIC ACTIVITIES: CPT | Mod: GO

## 2024-09-10 PROCEDURE — 85025 COMPLETE CBC W/AUTO DIFF WBC: CPT

## 2024-09-10 PROCEDURE — 120N000003 HC R&B IMCU UMMC

## 2024-09-10 PROCEDURE — 97161 PT EVAL LOW COMPLEX 20 MIN: CPT | Mod: GP | Performed by: PHYSICAL THERAPIST

## 2024-09-10 PROCEDURE — 85520 HEPARIN ASSAY: CPT | Performed by: INTERNAL MEDICINE

## 2024-09-10 RX ORDER — METHOCARBAMOL 500 MG/1
500 TABLET, FILM COATED ORAL
Status: DISCONTINUED | OUTPATIENT
Start: 2024-09-10 | End: 2024-09-12

## 2024-09-10 RX ORDER — SENNOSIDES 8.6 MG
8.6 TABLET ORAL 2 TIMES DAILY PRN
Status: DISCONTINUED | OUTPATIENT
Start: 2024-09-10 | End: 2024-09-18

## 2024-09-10 RX ORDER — MAGNESIUM OXIDE 400 MG/1
400 TABLET ORAL EVERY 4 HOURS
Status: COMPLETED | OUTPATIENT
Start: 2024-09-10 | End: 2024-09-10

## 2024-09-10 RX ORDER — POLYETHYLENE GLYCOL 3350 17 G/17G
17 POWDER, FOR SOLUTION ORAL DAILY PRN
Status: DISCONTINUED | OUTPATIENT
Start: 2024-09-10 | End: 2024-09-18

## 2024-09-10 RX ADMIN — Medication 6.25 MG: at 17:40

## 2024-09-10 RX ADMIN — Medication 1 TABLET: at 21:06

## 2024-09-10 RX ADMIN — OXYCODONE HYDROCHLORIDE AND ACETAMINOPHEN 1000 MG: 500 TABLET ORAL at 08:18

## 2024-09-10 RX ADMIN — ATORVASTATIN CALCIUM 80 MG: 80 TABLET, FILM COATED ORAL at 08:18

## 2024-09-10 RX ADMIN — Medication 5 MG: at 22:09

## 2024-09-10 RX ADMIN — HEPARIN SODIUM 1150 UNITS/HR: 10000 INJECTION, SOLUTION INTRAVENOUS at 05:34

## 2024-09-10 RX ADMIN — Medication 1 TABLET: at 08:19

## 2024-09-10 RX ADMIN — MAGNESIUM OXIDE TAB 400 MG (241.3 MG ELEMENTAL MG) 400 MG: 400 (241.3 MG) TAB at 11:26

## 2024-09-10 RX ADMIN — MAGNESIUM OXIDE TAB 400 MG (241.3 MG ELEMENTAL MG) 400 MG: 400 (241.3 MG) TAB at 09:16

## 2024-09-10 RX ADMIN — GABAPENTIN 100 MG: 100 CAPSULE ORAL at 22:09

## 2024-09-10 RX ADMIN — LIDOCAINE 1 PATCH: 4 PATCH TOPICAL at 06:54

## 2024-09-10 RX ADMIN — CLOPIDOGREL BISULFATE 75 MG: 75 TABLET ORAL at 08:19

## 2024-09-10 RX ADMIN — POTASSIUM CHLORIDE 20 MEQ: 750 TABLET, EXTENDED RELEASE ORAL at 08:19

## 2024-09-10 RX ADMIN — ACETAMINOPHEN 650 MG: 325 TABLET ORAL at 05:28

## 2024-09-10 RX ADMIN — MAGNESIUM OXIDE TAB 400 MG (241.3 MG ELEMENTAL MG) 400 MG: 400 (241.3 MG) TAB at 08:18

## 2024-09-10 RX ADMIN — METHOCARBAMOL 500 MG: 500 TABLET ORAL at 21:06

## 2024-09-10 RX ADMIN — ACETAMINOPHEN 650 MG: 325 TABLET ORAL at 11:29

## 2024-09-10 RX ADMIN — Medication 6.25 MG: at 11:26

## 2024-09-10 RX ADMIN — ESCITALOPRAM OXALATE 10 MG: 10 TABLET ORAL at 08:21

## 2024-09-10 RX ADMIN — Medication 6.25 MG: at 08:19

## 2024-09-10 RX ADMIN — AMIODARONE HYDROCHLORIDE 200 MG: 200 TABLET ORAL at 08:18

## 2024-09-10 RX ADMIN — ACETAMINOPHEN 650 MG: 325 TABLET ORAL at 21:06

## 2024-09-10 RX ADMIN — FUROSEMIDE 40 MG: 40 TABLET ORAL at 08:19

## 2024-09-10 RX ADMIN — ACETAMINOPHEN 650 MG: 325 TABLET ORAL at 01:16

## 2024-09-10 RX ADMIN — DIGOXIN 125 MCG: 125 TABLET ORAL at 08:18

## 2024-09-10 ASSESSMENT — ACTIVITIES OF DAILY LIVING (ADL)
ADLS_ACUITY_SCORE: 26

## 2024-09-10 ASSESSMENT — NEW YORK HEART ASSOCIATION (NYHA) CLASSIFICATION: NYHA FUNCTIONAL CLASS: IV

## 2024-09-10 ASSESSMENT — PULMONARY FUNCTION TESTS: FEV1 (%PREDICTED): 2

## 2024-09-10 NOTE — PROGRESS NOTES
Pre-VAD Social Work Services Progress Note      Date of Initial Social Work Evaluation: 9/5/24  Collaborated with: Patient    Data: Duane was evaluated for LVAD and is remaining inpatient until he receives his VAD implant. He continues to work with therapies and working towards increasing his nutrition. Duane was sitting in his chair eating breakfast upon SW arrival. Duane reported struggling to sleep and having lower back pain. He indicated getting foam pad on bed and utilizing heat pad to support with pain. He indicates this has been helpful.    Intervention: SW met with patient for supportive visit and to inquire about questions or concerns. Provided information on LVAD support group this afternoon.    Assessment: Duane was pleasant and interactive in discussion. He indicated previously utilizing zoom, so indicated he knows how to log on. He expressed uncertainty on logging on in his phone and may need tablet from home. Duane denied having questions or concerns at this time.   Education provided by SW: Ongoing SW availability and support group  Plan:    Discharge Plans in Progress: TBD    Barriers to d/c plan: VAD implant & medical condition    Follow up Plan: SW to continue to follow for any potential psychosocial concerns pre and post VAD implant.    Jessi Almendarez, MSW, LGSW, APSW  [facility] Transplant/[facility]   [facility]  Ph. [phone]

## 2024-09-10 NOTE — PROGRESS NOTES
Calorie Count  Intake recorded for: 9/9  Total Kcals: 1566 Total Protein: 95g  Kcals from Hospital Food: 1566  Protein: 95g  Kcals from Outside Food (average):0 Protein: 0g  # Meals Ordered from Kitchen: 3 meals + snack from nursing unit   # Meals Recorded: 2 meals (First - 100% whole milk, 50% puree beef, puree peas, mashed potatoes w. Gravy, less than 25% ice cream, puree mixed berries)      (Second - 50% 1% milk, ice cream - from nursing unit)   # Supplements Recorded: 100% 3 Ensure Enlives, 2 Beneprotein packets      Wife called checking status on this med. She stated his yeast infection has gotten worse and is requesting more of this med

## 2024-09-10 NOTE — PLAN OF CARE
Neuro: A&Ox4; reports improved sleep overnight since using foam mattress topper from home. Called several times overnight.  Cardiac: Afib 70s-80s; occasionally V-paced.   Respiratory: Sating well on RA. LS clear.   GI/: Good UOP via urinal. LBM 9/9/24.  Diet/Appetite: Regular diet; calorie counts.  Skin: Scattered ecchymosis.   LDA: L PIV infusing heparin.  Activity: SBA.  Pain: Back pain 2-3/10. Using heat pad, PRN acetaminophen. Woke up with worsened back pain, aching. Applied Lidocaine patch.  Lab: Mg, K protocols. RN managed heparin.

## 2024-09-10 NOTE — PROGRESS NOTES
[facility]  Cardiology II Service / Advanced Heart Failure  Daily Progress Note    Patient: Duane C Johnson      : 1950      MRN: [id number]    Assessment/Plan:   Duane C Johnson is a 74 year old male pmhx of anterior STEMI s/p PCI to the pLAD on 10/26/23 with a VT/VF arrest the next day (10/27) with patent stents and unchanged anatomy on repeat angiogram. Placed on amiodarone and discharged 23, ICD was not indicated as this was within 48h of his MI. His EF prior to discharge was 20-30% with a large area of akinesis in the LAD, placed on apixaban due to this (Apixaban dcd on 24). Has had multiple admissions for HF exacerbation last year.  Admitted on 24. He presents for 2 weeks of worsening fatigue; found to have BNP of 16k and L pleural effusion. Admitted for diuresis and RHC. CPX and RHC showed significant functional limitations due to heart failue. Plan for DT VAD while inpatient.     Today's changes:   - Back pain improved after friend brought a foam pad for him to use last night. Slept better. Is requesting Robaxin be available if continuing to have pain at night.     - Continue Lasix 40mg PO   - Continuing Captopril at 6.25 TID. Tolerating this dose without dizziness   - Continue Digoxin 125mcg   - Ongoing evaluation for LVAD  >  Pt went today for some extractions at 1300 and plan for remainder of extractions at 1300 on . Holding heparin until extractions are completed. Greatly appreciate Dent[employer] assistance in setting this up on short notice.   > Optimizing nutritional status. Pt eats all of his meals and drinks his protein supplements if somebody reminds him to.    > cMRI results pending   - LVAD tentatively scheduled for    - Neuropsych inpatient eval    # Acute heart failure exacerbation  #Possible ambulatory shock  # HFrEf 2/2 ICM (EF 15-20% by TTE 24)  Fluid status: Euvolemic  ACEi/ARB/ARNi/afterload reduction: Captopril  6.25 mg TID, hold for SBP < 90  BB: Hold metoprolol due to reduced CO and borderline BP  Aldosterone antagonist: unable to start d/t hypotension   SGLT2i: Empagliflozin 10mg (held on 9/8 for possible surgeries)  SCD prophylaxis: s/p ICD 5/2024  NSAID use: contraindicated  Antiarrhythmic: Amiodarone 200mg   Diuretic: Started 40 mg daily Lasix for maintenance on 9/5    Echo from 8/31 with EF 15-20% and trace MR. See below for full report.    RHC from 9/3 showing normal R sided pressures, mildly increased L sided pressures, reduced CO. See below for full report.  BP as low as 84/62 (MAP 69), has remained asymptomatic other than fatigue and mild SOB.    - BNP of 16k on admission with L sided pleural effusion   - Continuing to hold Metoprolol   - Lasix 40 mg daily for maintenance, started 9/5   - Low intensity heparin gtt.    - Holding for dental procedures    - Continue Captopril 6.25 TID    - Continue Digoxin 125mcg    - cMRI as below for LVAD eval    #AFib  # h/o VT/VF arrest in 2023  # ICD placed on 5/8/24  Was on apixaban for low EF (akinesis of the mid-distal anterior,mid anteroseptum, distal septum and the apex) and questionable history of Afib. Since there was no episode of Afib captured apixaban was stopped on 7/5/24. Device interrogation showing AF episode started on 8/28/24. Converted to A-paced rhythm after cardioversion on 9/5. Back in AF on 9/7.   - PTA Amio 200, Magnesium 400, Potassium 20  - Holding PTA Metoprolol   - Mag and Potassium replacement per nursing protocol  - Had successful cardioversion on 9/5   > Back in Afib on 9/7. Maintaining regular rates and asymptomatic    #LVAD evaluation   Pt has received financial clearance and LVAD evaluation is proceeding.  - Dental evaluation shows carries in all remaining teeth. Will need extraction   > Awaiting schedule for extractions  - Urology consulted for history of prostate cancer. Has completed therapy and last PSA was undetectable. This is not an  obstacle to LVAD  - Abd US without evidence of chronic liver disease.   - No significant carotid stenosis   - ABIs normal   - Inpatient neuropsych eval requested (not absolutely necessary)   - Optimizing nutritional status   - cMRI to definitively rule out thrombus given recent afib    # CAD s/p PCI to LAD  # STEMI  Anterior STEMI on 10/26/23 with a VT/VF arrest the next day. Repeat angio showed patent stents and unchanged anatomy.   - PTA plavix, Atorvastatin 80    #DOMENICA - Resolved  Baseline Cr normal at ~1. Cr of 1.55 on  with this acute episode of HF. Concern for type 1 cardiorenal syndrome. uPCR negative.   - Diuresis as above  - avoid nephrotoxins    # Moderate malnutrition  - Nutrition consulted to help manage  - Encourage high protein drinks     ================================  Chronic Problems:    #Prostate Cancer  Diagnosed in 2023. Underwent Leuprolide on 3/19/24 and 24. Completed radiation treatment on 24   - Consider testosterone level as outpatient to assess for recovery after ADT    #L inguinal hernia  Pt reports this has been present for some time. Not causing any pain. Not a surgical candidate with current HF status so will not consult surgery at this time.     Hospital Checklist:  DVT Prophylaxis: Heparin gtt  Code Status: Full Code  Bowel regimen: PRN  Diet/Nutrition: 2L fluid and 2G sodium restriction.   Lines: Left PIV    Disposition Plannin+ days for LVAD evaluation    Staffed w/ Dr. Cruz.    Cristobal Fisher MD  PGY1, Internal Medicine  Cardiology 2 Service     09/10/2024  ==================================    Subjective:   No acute events overnight. States that his back pain was better last night and he slept better due to having a foam pad brought in. Still having some amount of mid thoracic pain. Feels like the topical treatments aren't helping much. Walking around the halls multiple times a day and not having any chest pain, dizziness, or SOB with this. Also not  having any back pain or problems while walking.   Objective:     Vitals:    09/08/24 0400 09/09/24 0415 09/10/24 0400   Weight: 62 kg (136 lb 11.2 oz) 62.9 kg (138 lb 9.6 oz) 62.6 kg (137 lb 14.4 oz)     Vital Signs with Ranges  Temp:  [97.6  F (36.4  C)-98.9  F (37.2  C)] 98  F (36.7  C)  Pulse:  [70-83] 72  Resp:  [16] 16  BP: ()/(62-89) 104/74  SpO2:  [97 %-100 %] 100 %  I/O last 3 completed shifts:  In: 962 [P.O.:870; I.V.:92]  Out: 400 [Urine:400]    Exam:  General: No acute distress. Sitting up in chair.   HEENT: Normocephalic, atraumatic  CV: Irregular rhythm, regular rate, no murmurs   Lungs: On RA, no crackles. No wheezes, no increased WOB  Abd: Soft, non-tender, non-distended  Back: No midline or paraspinal tenderness.  Ext: Warm and well-perfused, no lower extremity edema  Neuro: Awake, alert, conversational, moving all extremities spontaneously throughout examination    Medications   Current Facility-Administered Medications   Medication Dose Route Frequency Provider Last Rate Last Admin    [Held by provider] heparin 25,000 units in 0.45% NaCl 250 mL ANTICOAGULANT infusion  0-5,000 Units/hr Intravenous Continuous Cristobal Fisher MD   Stopped at 09/10/24 1240     Current Facility-Administered Medications   Medication Dose Route Frequency Provider Last Rate Last Admin    amiodarone (PACERONE) tablet 200 mg  200 mg Oral Daily Cristobal Fisher MD   200 mg at 09/10/24 0818    atorvastatin (LIPITOR) tablet 80 mg  80 mg Oral Daily Cristobal Fisher MD   80 mg at 09/10/24 0818    captopril (CAPOTEN) half-tab 6.25 mg  6.25 mg Oral TID AC Cristobal Fisher MD   6.25 mg at 09/10/24 1126    clopidogrel (PLAVIX) tablet 75 mg  75 mg Oral Daily Cristobal Fisher MD   75 mg at 09/10/24 0819    digoxin (LANOXIN) tablet 125 mcg  125 mcg Oral Daily Danial Fofana MD   125 mcg at 09/10/24 0818    [Held by provider] empagliflozin (JARDIANCE) tablet 10 mg  10 mg Oral Daily Cristobal Fisher MD   10 mg at 09/08/24 0907     escitalopram (LEXAPRO) tablet 10 mg  10 mg Oral Daily Cristobal Fisher MD   10 mg at 09/10/24 0821    furosemide (LASIX) tablet 40 mg  40 mg Oral Daily Danial Fofana MD   40 mg at 09/10/24 0819    gabapentin (NEURONTIN) capsule 100 mg  100 mg Oral At Bedtime Cristobal Fisher MD   100 mg at 09/09/24 2306    Lidocaine (LIDOCARE) 4 % Patch 1 patch  1 patch Transdermal Q24h Danial Fofana MD   1 patch at 09/10/24 0654    magnesium oxide (MAG-OX) tablet 400 mg  400 mg Oral Daily Cristobal Fisher MD   400 mg at 09/10/24 0916    multivitamin w/minerals (THERA-VIT-M) tablet 1 tablet  1 tablet Oral BID Cristobal Fisher MD   1 tablet at 09/10/24 0819    potassium chloride tray ER (KLOR-CON M10) CR tablet 20 mEq  20 mEq Oral Daily Cristobal Fisher MD   20 mEq at 09/10/24 0819    sodium chloride (PF) 0.9% PF flush 10 mL  10 mL Intravenous Once Vladimir Johnson MD        vitamin C (ASCORBIC ACID) tablet 1,000 mg  1,000 mg Oral Daily Cristobal Fisher MD   1,000 mg at 09/10/24 0818       Data   Recent Labs   Lab 09/10/24  0530 09/09/24  0545 09/08/24  0536   WBC 10.5 11.7* 15.8*   HGB 9.6* 9.5* 9.8*   MCV 97 97 98    320 331   INR 1.15 1.17* 1.26*   * 135 137   POTASSIUM 4.5 4.5 4.3   CHLORIDE 98 101 104   CO2 26 26 24   BUN 41.7* 36.4* 31.7*   CR 0.88 0.88 0.89   ANIONGAP 9 8 9   PRANAV 8.7* 8.7* 8.6*   * 113* 130*   ALBUMIN 2.9* 3.1* 2.9*   PROTTOTAL 6.5 6.7 6.3*   BILITOTAL 0.3 0.3 0.3   ALKPHOS 107 94 94   ALT 35 33 27   AST 38 35 34     RHC 9/3/24    RA: 11/11/9  RV:39/9  PA:39/16/26  PCWP:16/22/15    Pa Sat:48.2%  Goldy; CO/CI: 2.91/1.71  Thermodilution; CO/CI:3.53/2.08   Right sided filling pressures are normal.   Left sided filling pressures are mildly elevated. Mild elevated pulmonary hypertension.   Reduced cardiac output level.         Echocardiogram Complete   Result Value    LVEF  15-20% (severely reduced)    [facility]    [id number]  [facility]  [id number]  [id number]^ROXANA^CRISTOBAL     [facility]  [facility]  Echocardiography Laboratory  [address] [place] [address]     Name: JOHNSON, DUANE C  MRN: [id number]  : 1950  Study Date: 2024 08:20 AM  Age: 74 yrs  Gender: Male  Patient Location: [facility]  Reason For Study: CHF  Ordering Physician: SONIA SCHMIDT  Performed By: Yessenia Cano MEGHANA     BSA: 1.7 m2  Height: 66 in  Weight: 145 lb  HR: 71  BP: 94/73 mmHg  ______________________________________________________________________________  Procedure  Complete Portable Echo Adult. Contrast Optison. Optison (NDC #2296-1514-73)  given intravenously. Patient was given 6 ml mixture of 3 ml Optison and 6 ml  saline. 3 ml wasted.  ______________________________________________________________________________  Interpretation Summary  Left ventricular function is decreased. The ejection fraction is 15-20%  (severely reduced).  Moderate left ventricular dilation is present.  Apical wall akinesis is present.  Severe diffuse hypokinesis is present.  There is no thrombus seen in the left ventricle.  The right ventricle is normal size.  Global right ventricular function is normal.  Mild functional mitral insufficiency is present.  Mild to moderate tricuspid functional insufficiency is present.  Pulmonary hypertension is present.The right ventricular systolic pressure is  40mmHg above the right atrial pressure.  IVC diameter and respiratory changes fall into an intermediate range  suggesting an RA pressure of 8 mmHg.     This study was compared with the study from 2024 .MR, TR improved. LVEF  similar in both studies compared side to side. So overall no change in LVEF  ______________________________________________________________________________  Left Ventricle  Left ventricular wall thickness is normal. Moderate left ventricular dilation  is present. Left ventricular diastolic function is not assessable. Left  ventricular function is decreased. The ejection fraction is 15-20%  (severely  reduced). Apical wall akinesis is present. Severe diffuse hypokinesis is  present. There is no thrombus seen in the left ventricle.     Right Ventricle  The right ventricle is normal size. Global right ventricular function is  normal. A pacemaker lead is noted in the right ventricle.     Atria  Moderate left atrial enlargement is present. Moderate right atrial enlargement  is present.     Mitral Valve  Mild mitral insufficiency is present.     Aortic Valve  Trileaflet aortic sclerosis without stenosis. On Doppler interrogation, there  is no significant stenosis or regurgitation.     Tricuspid Valve  Mild to moderate tricuspid insufficiency is present. The right ventricular  systolic pressure is approximated at 39.7 mmHg plus the right atrial pressure.  Pulmonary hypertension is present. The right ventricular systolic pressure is  40mmHg above the right atrial pressure.     Pulmonic Valve  On Doppler interrogation, there is no significant stenosis or regurgitation.     Vessels  The aorta root is normal. IVC diameter and respiratory changes fall into an  intermediate range suggesting an RA pressure of 8 mmHg.     Pericardium  No pericardial effusion is present.     Compared to Previous Study  This study was compared with the study from 2024 . MR, TR improved. LVEF  similar in both studies compared side to side. So overall no change in LVEF.  ______________________________________________________________________________  MMode/2D Measurements & Calculations     IVSd: 0.89 cm  LVIDd: 6.0 cm  LVIDs: 5.5 cm  LVPWd: 0.79 cm  FS: 7.1 %  LV mass(C)d: 197.0 grams  LV mass(C)dI: 112.9 grams/m2  LVOT diam: 2.0 cm  LVOT area: 3.2 cm2  EF Biplane: 16.8 %  LA Volume (BP): 71.3 ml  LA Volume Index (BP): 41.0 ml/m2  RV Base: 4.6 cm  RWT: 0.27     TAPSE: 1.0 cm     Doppler Measurements & Calculations  MV E max francisco j: 82.7 cm/sec  LV V1 max P.9 mmHg  LV V1 max: 84.2 cm/sec  LV V1 VTI: 13.8 cm  MR PISA: 4.2 cm2  MR  ERO: 0.33 cm2  MR volume: 35.7 ml  SV(LVOT): 43.8 ml  SI(LVOT): 25.1 ml/m2  PA acc time: 0.08 sec  TR max ivan: 315.1 cm/sec  TR max P.7 mmHg  RV S Ivan: 10.7 cm/sec     ______________________________________________________________________________  Report approved by: Jeanine MEJIA 2024 11:05 AM            Examination: XR CHEST 2 VIEWS 2024 3:06 PM     Indication: Shortness of breath     Comparison: Radiograph 2024. CT 2023.     Findings:  PA and lateral views of the chest. Left chest wall implantable cardiac  defibrillator with grossly intact leads/shock coil. Coronary stenting.  Trachea is midline. Stable mild cardiomegaly. Mild blunting of the  left costophrenic angle, likely representing small pleural effusion.  No pneumothorax. No focal consolidation or any definite abnormal  airspace opacities. No acute or suspicious osseous abnormality.  Unremarkable visualized abdomen.      Impression   Impression:   1. Stable mild cardiomegaly with implantable cardiac defibrillator and  coronary stenting.  2. Small left pleural effusion.     I have personally reviewed the examination and initial interpretation  and I agree with the findings.     PANCHITO EID MD         SYSTEM ID:  [id number]      2023 CMR  Clinical history: 73 year old with anterior STEMI, cardiac arrest in Oct 2023  Comparison CMR: none  1. The left ventricle is severely enlarged. There is wall thinning and akinesis of the mid-distal anterior,  mid anteroseptum, distal septum and the apex  The global systolic function is severely reduced. The LVEF is 28%.  2. The right ventricle is normal in cavity size. The global systolic function is normal. The RVEF is 52%.   3. The right atrium is normal and the left atrium is severely enlarged.  4. There is mild mitral regurgitation.   5. There is transmural late gadolinium enhancement in mid-distal anterior, mid anteroseptum, distal  septum and the apex consistent with a large  infarction in the LAD territory.   6. There is no pericardial effusion.  7. There is no intracardiac thrombus.  8. There are bilateral pleural effusions.  CONCLUSIONS:   Ischemic cardiomyopathy with a large anteroapical infarction.   Severely reduced left ventricular function and normal right ventricular function, LVEF 28% and RVEF 52%.     Cardiac Catheterization:  10/26/23    1st Mrg lesion is 20% stenosed.    Prox LAD to Mid LAD lesion is 100% stenosed.    1st Diag-1 lesion is 80% stenosed.    1st Diag-2 lesion is 50% stenosed.    Dist LAD lesion is 100% stenosed.    RPDA lesion is 70% stenosed.    Dist RCA lesion is 40% stenosed.     Anterior STEMI  Two vessel obstructive CAD (100% pLAD occlusion, 70% rPDA stenosis, 80% diagonal 1 stenosis)  PCI of pLAD to mLAD with BRENDA x 1 (Synergy 2.50x 28 mm) post dilated to 2.75 vessel  CVC placement in RIJ  LVEDP of 26 mmHg  Hemostasis of RRA with TR band      2/19/2024 Echo  Interpretation Summary     1. The left ventricle is moderately dilated. Left ventricular hypertrophy is  noted by two-dimensional echocardiography. Proximal septal thickening is  noted. Left ventricular systolic function is moderate to severely reduced. The  visual ejection fraction is 25-30%. Biplane LVEF is 25%. Diastolic Doppler  findings (E/E' ratio and/or other parameters) suggest left ventricular filling  pressures are increased. There is severe hypokinesis to akinesis of the mid  anterior/anterolateral/anteroseptal/inferoseptal walls and all apical segments  of the left ventricle. There is no thrombus seen in the left ventricle.  2. The right ventricle is normal size. The right ventricular systolic function  is normal.  3. The left atrium is moderately dilated. Right atrial size is normal. There  is no color Doppler evidence of an atrial shunt.  4. There is moderate to mod-severe (2-3+) mitral regurgitation.  5. There is mild to moderate (1-2+) tricuspid regurgitation.Right ventricular  systolic  pressure is elevated, consistent with moderate pulmonary  hypertension.  6. No pericardial effusion.  7. In direct comparison to the previous study dated 10/30/2023, there has been  an interval increase in the degree of mitral regurgitation. The other findings  are similar.     RHC 5/6/24  RA: 10/9/6 mmHg  RV: 61/11 mmHg  PA: 63/24/38 mmHg  PCWP: Unable to obtain accurate measurement  CO/CI (Goldy): 3.89/2.3 L/min/m2    PA sat: 64%  MAP: 85 mmHg       Right sided filling pressures are normal.    Mild elevated pulmonary hypertension.    Normal cardiac output level.        Device interrogated on 8/30/24  Device: [employer] D533 RESONATE HF ICD  Normal device function.  Mode: DDDR  bpm  AP: 33%  : 3%  Intrinsic rhythm: Afib w/ VS  bpm  Thoracic Impedance: Below reference line, suggests possible intrathoracic fluid accumulation.  Lead Trends Appear Stable.  Estimated battery longevity to RRT = 12.5 years.    Atrial Arrhythmia: AF episode began on 8/28/24  AF [place]: 11%  Anticoagulant: Eliquis  Ventricular Arrhythmia: None  Setting Changes: Rate response turned ON in device, pacing mode changed to DDDR from DDD, fallback rate during AT/AF episodes 70 bpm.

## 2024-09-10 NOTE — PROGRESS NOTES
CLINICAL NUTRITION SERVICES - BRIEF NOTE    9/7         Total Kcals: 1160     Total Protein: 71g   9/8         Total Kcals: 564       Total Protein: 41g   9/9         Total Kcals: 1566     Total Protein: 95g   On average pt consumed ~1097 kcals, 69 g protein  (~71% of estimated kcal needs and 100% of low end of estimated protein needs)  Please see note 9/4 for most recent full assessment. RD to monitor and continue to follow up per protocol.  INTERVENTIONS  Recommendations / Nutrition Prescription  If pt consuming less than 1050 kcals and 45 g protein daily on average, rec TFs pre-op.  -Continue oral supplements  -Room service appropriate with assist  -continue daily MVI     Future/Additional Recommendations:  -Monitor need for sodium and fluid restrictions. Encourage pt to self-select tolerated foods/beverages (altered dentition). Rec small, frequent meals and use of oral supplements. Pt with increased protein needs.    -Consider iron supplementation if warranted.   -If pt needs TFs if/when post-op Tx, initiate TFs, Osmolite1.5 TF + Prosource TF 20, 2 pkts daily (concentrated, maintenance TF, or equivalent). Initiate TFs at 15 mL/hr, advancing rate by 10 mL Q 8 hrs (or per provider discretion, pending hemodynamic stability) to goal rate of 45 mL/hr. Osmolite 1.5 Vance (or equivalent) @ goal of 45ml/hr  (1080ml/day) + 2 pkts Prosource TF 20 provides: 1780 kcals, 107 g PRO, 822 ml free H20, 219 g CHO, and 0 g fiber daily.                            If begin tube feeds:                         - Flush FT with 30 mL water Q 4 hrs for patency. Rec provider adjust free water flushes as needed, pending fluid status.                        - Ensure K+/Mg++/Phos labs are ordered daily until TFs advance to goal infusion to evaluate for sx of refeeding with nutrition received. If lytes trend low, aggressively replace lytes.                            - If not already ordered, order a multivitamin/mineral (certavite 15 mL/day  via FT) to help ensure micronutrient needs being met with suspected hypermetabolic demands and potential interruptions to TF infusions.                        - Monitor TF and possible need to adjust nutrition support regimen if necessary, pending medical course and nutrition status.       Implementation  Monitoring kcal counts    Malina Suresh MS, RD, LD, CNSC    6C (beds [id number]) + 7C (beds [id number]) + ED   Available in [facility] by name or unit dietitian

## 2024-09-10 NOTE — PLAN OF CARE
"%BP 95/79 (BP Location: Left arm, Cuff Size: Adult Small)   Pulse 72   Temp 97.7  F (36.5  C) (Axillary)   Resp 16   Ht 1.676 m (5' 5.98\")   Wt 62.6 kg (137 lb 14.4 oz)   SpO2 99%   BMI 22.27 kg/m       Neuro: A&Ox4. Forgetful.   Cardiac: V paced, occasional Afib and PVCs . Soft BP w/o symptoms   Respiratory: Sating  on RA.  GI/: Adequate urine output. No BM this shift.   Diet/appetite: Dental soft diet and calorie counts. Fair diet.   Activity:  SBA , up to chair and in halls.  Pain: At acceptable level on current regimen. Voltaren cream and prn robaxin available.   Skin: No new deficits noted.  LDA's: L PIV- SL (Hep gtt on hold- pt needs dental visit tmr)     Plan: Pt got 7 teeth removed today, will need to remove more tmr- Hep gtt is on hold. Continue with POC. Notify primary team with changes.       Problem: Adult Inpatient Plan of Care  Goal: Plan of Care Review  Description: The Plan of Care Review/Shift note should be completed every shift.  The Outcome Evaluation is a brief statement about your assessment that the patient is improving, declining, or no change.  This information will be displayed automatically on your shift  note.  Outcome: Progressing  Flowsheets (Taken 9/10/2024 1309)  Plan of Care Reviewed With: patient  Goal: Patient-Specific Goal (Individualized)  Description: You can add care plan individualizations to a care plan. Examples of Individualization might be:  \"Parent requests to be called daily at 9am for status\", \"I have a hard time hearing out of my right ear\", or \"Do not touch me to wake me up as it startles  me\".  Outcome: Progressing  Goal: Absence of Hospital-Acquired Illness or Injury  Outcome: Progressing  Intervention: Identify and Manage Fall Risk  Recent Flowsheet Documentation  Taken 9/10/2024 1211 by Fariba Johns, RN  Safety Promotion/Fall Prevention: activity supervised  Taken 9/10/2024 0807 by Fariba Johns, RN  Safety Promotion/Fall Prevention: activity " supervised  Intervention: Prevent Skin Injury  Recent Flowsheet Documentation  Taken 9/10/2024 1211 by Fariba Johns RN  Body Position: position changed independently  Taken 9/10/2024 0807 by Fariba Johns RN  Body Position: position changed independently  Intervention: Prevent and Manage VTE (Venous Thromboembolism) Risk  Recent Flowsheet Documentation  Taken 9/10/2024 1211 by Fariba Johns RN  VTE Prevention/Management: SCDs off (sequential compression devices)  Taken 9/10/2024 0807 by Fariba Johns RN  VTE Prevention/Management: SCDs off (sequential compression devices)  Intervention: Prevent Infection  Recent Flowsheet Documentation  Taken 9/10/2024 1211 by Fariba Johns RN  Infection Prevention:   environmental surveillance performed   equipment surfaces disinfected   hand hygiene promoted   rest/sleep promoted   single patient room provided  Taken 9/10/2024 0807 by Fariba Johns RN  Infection Prevention:   environmental surveillance performed   equipment surfaces disinfected   hand hygiene promoted   rest/sleep promoted   single patient room provided  Goal: Optimal Comfort and Wellbeing  Outcome: Progressing  Intervention: Monitor Pain and Promote Comfort  Recent Flowsheet Documentation  Taken 9/10/2024 1129 by Fariba Johns RN  Pain Management Interventions: medication (see MAR)  Taken 9/10/2024 0807 by Fariba Johns RN  Pain Management Interventions:   rest   repositioned  Goal: Readiness for Transition of Care  Outcome: Progressing     Problem: Risk for Delirium  Goal: Optimal Coping  Outcome: Progressing  Intervention: Optimize Psychosocial Adjustment to Delirium  Recent Flowsheet Documentation  Taken 9/10/2024 1211 by Fariba Johns RN  Supportive Measures:   active listening utilized   goal-setting facilitated   decision-making supported   positive reinforcement provided   problem-solving facilitated   relaxation techniques promoted  Taken 9/10/2024 0807 by Fariba Johns RN  Supportive Measures:   active  listening utilized   goal-setting facilitated   decision-making supported   positive reinforcement provided   problem-solving facilitated   relaxation techniques promoted  Goal: Improved Behavioral Control  Outcome: Progressing  Intervention: Prevent and Manage Agitation  Recent Flowsheet Documentation  Taken 9/10/2024 1211 by Fariba Johns RN  Environment Familiarity/Consistency: daily routine followed  Taken 9/10/2024 0807 by Fariba Johns RN  Environment Familiarity/Consistency: daily routine followed  Intervention: Minimize Safety Risk  Recent Flowsheet Documentation  Taken 9/10/2024 1211 by Fariba Johns RN  Communication Enhancement Strategies: call light answered in person  Enhanced Safety Measures: monitor patients coagulation values  Taken 9/10/2024 0807 by Fariba Johns RN  Communication Enhancement Strategies: call light answered in person  Enhanced Safety Measures: monitor patients coagulation values  Goal: Improved Attention and Thought Clarity  Outcome: Progressing  Goal: Improved Sleep  Outcome: Progressing  Intervention: Promote Sleep  Recent Flowsheet Documentation  Taken 9/10/2024 1211 by Fariba Johns RN  Sleep/Rest Enhancement:   comfort measures   consistent schedule promoted  Taken 9/10/2024 0807 by Fariba Johns RN  Sleep/Rest Enhancement:   comfort measures   consistent schedule promoted     Problem: Heart Failure  Goal: Optimal Coping  Outcome: Progressing  Intervention: Support Psychosocial Response  Recent Flowsheet Documentation  Taken 9/10/2024 1211 by Fariba Johns RN  Supportive Measures:   active listening utilized   goal-setting facilitated   decision-making supported   positive reinforcement provided   problem-solving facilitated   relaxation techniques promoted  Taken 9/10/2024 0807 by Fariba Johns RN  Supportive Measures:   active listening utilized   goal-setting facilitated   decision-making supported   positive reinforcement provided   problem-solving facilitated   relaxation  techniques promoted  Goal: Optimal Cardiac Output  Outcome: Progressing  Intervention: Optimize Cardiac Output  Recent Flowsheet Documentation  Taken 9/10/2024 1211 by Fariba Johns RN  Stabilization Measures: legs elevated  Environmental Support:   calm environment promoted   caregiver consistency promoted   rest periods encouraged  Taken 9/10/2024 0807 by Fariba Johns RN  Stabilization Measures: legs elevated  Environmental Support:   calm environment promoted   caregiver consistency promoted   rest periods encouraged  Goal: Stable Heart Rate and Rhythm  Outcome: Progressing  Goal: Optimal Functional Ability  Outcome: Progressing  Intervention: Optimize Functional Ability  Recent Flowsheet Documentation  Taken 9/10/2024 1211 by Fariba Johns RN  Activity Management: activity adjusted per tolerance  Taken 9/10/2024 0807 by Fariba Johns RN  Activity Management: activity adjusted per tolerance  Goal: Fluid and Electrolyte Balance  Outcome: Progressing  Intervention: Monitor and Manage Fluid and Electrolyte Balance  Recent Flowsheet Documentation  Taken 9/10/2024 1211 by Fariba Johns RN  Fluid/Electrolyte Management: fluids provided  Taken 9/10/2024 0807 by Fariba Johns RN  Fluid/Electrolyte Management: fluids provided  Goal: Improved Oral Intake  Outcome: Progressing  Intervention: Promote and Optimize Nutrition Intake  Recent Flowsheet Documentation  Taken 9/10/2024 1211 by Fariba Johns RN  Oral Nutrition Promotion:   calorie-dense liquids provided   rest periods promoted  Taken 9/10/2024 0807 by Fariba Johns RN  Oral Nutrition Promotion:   calorie-dense liquids provided   rest periods promoted  Goal: Effective Oxygenation and Ventilation  Outcome: Progressing  Intervention: Promote Airway Secretion Clearance  Recent Flowsheet Documentation  Taken 9/10/2024 1211 by Fariba Johns RN  Cough And Deep Breathing: done independently per patient  Activity Management: activity adjusted per tolerance  Taken 9/10/2024  0807 by Fariba Johns RN  Cough And Deep Breathing: done independently per patient  Activity Management: activity adjusted per tolerance  Intervention: Optimize Oxygenation and Ventilation  Recent Flowsheet Documentation  Taken 9/10/2024 1211 by Fariba Johns RN  Airway/Ventilation Management: pulmonary hygiene promoted  Head of Bed (HOB) Positioning: HOB at 20 degrees  Taken 9/10/2024 0807 by Fariba Johns RN  Airway/Ventilation Management: pulmonary hygiene promoted  Head of Bed (HOB) Positioning: HOB at 20 degrees  Goal: Effective Breathing Pattern During Sleep  Outcome: Progressing  Intervention: Monitor and Manage Obstructive Sleep Apnea  Recent Flowsheet Documentation  Taken 9/10/2024 1211 by Fariba Johns RN  Medication Review/Management: medications reviewed  Taken 9/10/2024 0807 by Fariba Johns RN  Medication Review/Management: medications reviewed     Problem: Cardiovascular Surgery  Goal: Improved Activity Tolerance  Outcome: Progressing  Intervention: Optimize Tolerance for Activity  Recent Flowsheet Documentation  Taken 9/10/2024 1211 by Fariba Johns RN  Environmental Support:   calm environment promoted   caregiver consistency promoted   rest periods encouraged  Taken 9/10/2024 0807 by Fariba Johns RN  Environmental Support:   calm environment promoted   caregiver consistency promoted   rest periods encouraged  Goal: Optimal Coping with Heart Surgery  Outcome: Progressing  Intervention: Support Psychosocial Response to Surgery  Recent Flowsheet Documentation  Taken 9/10/2024 1211 by Fariba Johns RN  Supportive Measures:   active listening utilized   goal-setting facilitated   decision-making supported   positive reinforcement provided   problem-solving facilitated   relaxation techniques promoted  Taken 9/10/2024 0807 by Fariba Johns RN  Supportive Measures:   active listening utilized   goal-setting facilitated   decision-making supported   positive reinforcement provided   problem-solving  facilitated   relaxation techniques promoted  Goal: Absence of Bleeding  Outcome: Progressing  Goal: Effective Bowel Elimination  Outcome: Progressing  Goal: Effective Cardiac Function  Outcome: Progressing  Intervention: Optimize Cardiac Output and Blood Flow  Recent Flowsheet Documentation  Taken 9/10/2024 1211 by Fariba Johns RN  Stabilization Measures: legs elevated  Taken 9/10/2024 0807 by Fariba Johns RN  Stabilization Measures: legs elevated  Goal: Optimal Cerebral Tissue Perfusion  Outcome: Progressing  Intervention: Protect and Optimize Cerebral Perfusion  Recent Flowsheet Documentation  Taken 9/10/2024 1211 by Fariba Johns RN  Head of Bed (HOB) Positioning: HOB at 20 degrees  Taken 9/10/2024 0807 by Fariba Johns RN  Head of Bed (HOB) Positioning: HOB at 20 degrees  Goal: Fluid and Electrolyte Balance  Outcome: Progressing  Intervention: Monitor and Manage Fluid and Electrolyte Balance  Recent Flowsheet Documentation  Taken 9/10/2024 1211 by Fariba Johns RN  Fluid/Electrolyte Management: fluids provided  Taken 9/10/2024 0807 by Fariba Johns RN  Fluid/Electrolyte Management: fluids provided  Goal: Blood Glucose Level Within Targeted Range  Outcome: Progressing  Goal: Absence of Infection Signs and Symptoms  Outcome: Progressing  Intervention: Prevent or Manage Infection  Recent Flowsheet Documentation  Taken 9/10/2024 1211 by Fariba Johns RN  Infection Prevention:   environmental surveillance performed   equipment surfaces disinfected   hand hygiene promoted   rest/sleep promoted   single patient room provided  Taken 9/10/2024 0807 by Fariba Johns RN  Infection Prevention:   environmental surveillance performed   equipment surfaces disinfected   hand hygiene promoted   rest/sleep promoted   single patient room provided  Goal: Anesthesia/Sedation Recovery  Outcome: Progressing  Intervention: Optimize Anesthesia Recovery  Recent Flowsheet Documentation  Taken 9/10/2024 1211 by Fariba Johns RN  Safety  Promotion/Fall Prevention: activity supervised  Stabilization Measures: legs elevated  Taken 9/10/2024 0807 by Fariba Johns RN  Safety Promotion/Fall Prevention: activity supervised  Stabilization Measures: legs elevated  Goal: Acceptable Pain Control  Outcome: Progressing  Intervention: Prevent or Manage Pain  Recent Flowsheet Documentation  Taken 9/10/2024 1129 by Fariba Johns RN  Pain Management Interventions: medication (see MAR)  Taken 9/10/2024 0807 by Fariba Johns RN  Pain Management Interventions:   rest   repositioned  Goal: Nausea and Vomiting Relief  Outcome: Progressing  Intervention: Prevent or Manage Nausea and Vomiting  Recent Flowsheet Documentation  Taken 9/10/2024 1211 by Fariba Johns RN  Nausea/Vomiting Interventions: (denies) other (see comments)  Taken 9/10/2024 0807 by Fariba Johns RN  Nausea/Vomiting Interventions: (denies) other (see comments)  Goal: Effective Urinary Elimination  Outcome: Progressing  Intervention: Monitor and Manage Urinary Retention  Recent Flowsheet Documentation  Taken 9/10/2024 1211 by Fariba Johns RN  Urinary Elimination Promotion: toileting device within reach  Taken 9/10/2024 0807 by Fariba Johns RN  Urinary Elimination Promotion: toileting device within reach  Goal: Effective Oxygenation and Ventilation  Outcome: Progressing  Intervention: Promote Airway Secretion Clearance  Recent Flowsheet Documentation  Taken 9/10/2024 1211 by Fariba Johns RN  Cough And Deep Breathing: done independently per patient  Airway/Ventilation Management: pulmonary hygiene promoted  Taken 9/10/2024 0807 by Fariba Johns RN  Cough And Deep Breathing: done independently per patient  Airway/Ventilation Management: pulmonary hygiene promoted         Plan of Care Reviewed With: patient

## 2024-09-10 NOTE — TELEPHONE ENCOUNTER
VAD Multidisciplinary Review  Multidisciplinary review date: 9/6/24  Inclusion Criteria  Discussed VAD with patient and/or decision makers. Reviewed anticipated survival benefit, anticipated functional improvement, risks, and benefits. Patient and/or decision maker verbalize understanding and agree to VAD implant?: Yes  VAD is elective procedure?: Yes  NYHA class: IV  INTERMACS score: 3  Patient has: failed to respond to optimal medical management for at least 45 of the last 60 days  Cardiopulmonary stress testing completed?: Yes  Cardiopulmonary stress test detail: MVO2 < 14 ml/kg/min, < 50% of predicted when RER is > 1.05, VE/VCO2 slope > 43  LVEF is: < 25%  Exclusion Criteria  Has condition, other than heart failure, which would limit survival to < 2 years?: No  Cardiomyopathy with restrictive physiology, unless severe LV systolic failure and LV dilation present?: No  Technical obstacles that pose and inordinately high surgical risk in the judgment of the MCS surgeon?: No  Active systemic infection? (unless ALL of following criteria met: negative blood cultures x 2 days, antibiotic treatment x 7 days and Infectious Disease clearance pre-operatively): No  Presence of active malignancy AND life expectancy < 2 years?: No  Stroke within the last six weeks, unless cleared by neurology team: No  Diagnosed with severe, irreversible pulmonary disease (supplemental O2 usage, FEV1 < 50% predicted): No  Pulmonary hypertension as a primary diagnosis: No  Chronic dialysis: No  Evidence of significant peripheral vascular disease accompanied by resting leg pain or ulceration unless treatment plan is in place: No  Carotid artery disease (>80% extracranial stenosis on Doppler) that could result in an adverse neurological event if left untreated: No  Evidence of an untreated abdominal aortic aneurysm >5 cm as measured by abdominal ultrasounds within the last 180 days unless treatment plan in place: No  Severe or irreversible  hepatic disease, evidence of shock liver, and/o biopsy-proven cirrhosis: No  Active contraindication to anticoagulation, INR > 2.5 in absence of concurrent anticoagulation therapy, platelet < 50,000, history of intolerance to anticoagulation, or other coagulopathy unless Hematology consulted and plan is in place: No  Acute valvular infective endocarditis with bacteremia: No  Neuromuscular disease, psychiatric diagnosis, dementia or other process that severely compromises ability to use and care for external system components or to ambulate/exercise: No  Inability to understand the procedure, risk involved, or to comply with follow-up evaluation by VAD team: No  For menstruating females: Current pregnancy or unable/unwilling to follow contraception plan: Not applicable  Relative Contraindications  Alcohol and/or recreational drug abuse within past six months: No  Significant history of depression or other mental illness, refractory to therapy or thought to be a risk to successful outcome with MCS, as per assessment of trained professional: No  Demonstrated on-going non-compliance with demonstrated inability to comply with medical recommendations on multiple occasions: No  Unsafe living environment or lack of adequate support system: No  Lack of adequate insurance coverage or waiver by hospital administration: No  Evidence of other ongoing physical, financial, or psychosocial issues that will preclude the intended goal of safety and improvements in quality and duration of life, unless a plan for resolution and support is in process: No  Multidisciplinary Decision  Decision: Approved Comment: Will plan for LVAD this admission once a little more optimized from nutrition and rehabing standpoint.   Implant as: Destination Therapy  Ineligible for transplant reason: Age

## 2024-09-11 ENCOUNTER — APPOINTMENT (OUTPATIENT)
Dept: OCCUPATIONAL THERAPY | Facility: CLINIC | Age: 74
DRG: 001 | End: 2024-09-11
Payer: MEDICARE

## 2024-09-11 LAB
ALBUMIN SERPL BCG-MCNC: 3.1 G/DL (ref 3.5–5.2)
ALP SERPL-CCNC: 109 U/L (ref 40–150)
ALT SERPL W P-5'-P-CCNC: 40 U/L (ref 0–70)
ANION GAP SERPL CALCULATED.3IONS-SCNC: 8 MMOL/L (ref 7–15)
AST SERPL W P-5'-P-CCNC: 37 U/L (ref 0–45)
BASOPHILS # BLD AUTO: 0 10E3/UL (ref 0–0.2)
BASOPHILS NFR BLD AUTO: 0 %
BILIRUB SERPL-MCNC: 0.3 MG/DL
BUN SERPL-MCNC: 43.9 MG/DL (ref 8–23)
CALCIUM SERPL-MCNC: 8.8 MG/DL (ref 8.8–10.4)
CHLORIDE SERPL-SCNC: 99 MMOL/L (ref 98–107)
CREAT SERPL-MCNC: 0.81 MG/DL (ref 0.67–1.17)
EGFRCR SERPLBLD CKD-EPI 2021: >90 ML/MIN/1.73M2
EOSINOPHIL # BLD AUTO: 0.1 10E3/UL (ref 0–0.7)
EOSINOPHIL NFR BLD AUTO: 1 %
ERYTHROCYTE [DISTWIDTH] IN BLOOD BY AUTOMATED COUNT: 17.1 % (ref 10–15)
GLUCOSE SERPL-MCNC: 106 MG/DL (ref 70–99)
HCO3 SERPL-SCNC: 27 MMOL/L (ref 22–29)
HCT VFR BLD AUTO: 31.4 % (ref 40–53)
HGB BLD-MCNC: 9.8 G/DL (ref 13.3–17.7)
IMM GRANULOCYTES # BLD: 0.1 10E3/UL
IMM GRANULOCYTES NFR BLD: 1 %
INR PPP: 1.15 (ref 0.85–1.15)
LYMPHOCYTES # BLD AUTO: 0.6 10E3/UL (ref 0.8–5.3)
LYMPHOCYTES NFR BLD AUTO: 7 %
MAGNESIUM SERPL-MCNC: 2 MG/DL (ref 1.7–2.3)
MCH RBC QN AUTO: 30.7 PG (ref 26.5–33)
MCHC RBC AUTO-ENTMCNC: 31.2 G/DL (ref 31.5–36.5)
MCV RBC AUTO: 98 FL (ref 78–100)
MONOCYTES # BLD AUTO: 0.3 10E3/UL (ref 0–1.3)
MONOCYTES NFR BLD AUTO: 4 %
NEUTROPHILS # BLD AUTO: 8 10E3/UL (ref 1.6–8.3)
NEUTROPHILS NFR BLD AUTO: 87 %
NRBC # BLD AUTO: 0 10E3/UL
NRBC BLD AUTO-RTO: 0 /100
PLATELET # BLD AUTO: 304 10E3/UL (ref 150–450)
POTASSIUM SERPL-SCNC: 4.6 MMOL/L (ref 3.4–5.3)
PROT SERPL-MCNC: 6.9 G/DL (ref 6.4–8.3)
RBC # BLD AUTO: 3.19 10E6/UL (ref 4.4–5.9)
SODIUM SERPL-SCNC: 134 MMOL/L (ref 135–145)
WBC # BLD AUTO: 9.2 10E3/UL (ref 4–11)

## 2024-09-11 PROCEDURE — 120N000003 HC R&B IMCU UMMC

## 2024-09-11 PROCEDURE — 85025 COMPLETE CBC W/AUTO DIFF WBC: CPT

## 2024-09-11 PROCEDURE — 250N000013 HC RX MED GY IP 250 OP 250 PS 637

## 2024-09-11 PROCEDURE — 80053 COMPREHEN METABOLIC PANEL: CPT

## 2024-09-11 PROCEDURE — 36415 COLL VENOUS BLD VENIPUNCTURE: CPT

## 2024-09-11 PROCEDURE — 97110 THERAPEUTIC EXERCISES: CPT | Mod: GO

## 2024-09-11 PROCEDURE — 85610 PROTHROMBIN TIME: CPT

## 2024-09-11 PROCEDURE — 99233 SBSQ HOSP IP/OBS HIGH 50: CPT | Mod: GC | Performed by: INTERNAL MEDICINE

## 2024-09-11 PROCEDURE — 83735 ASSAY OF MAGNESIUM: CPT

## 2024-09-11 RX ORDER — ASPIRIN 81 MG/1
81 TABLET ORAL DAILY
Status: DISCONTINUED | OUTPATIENT
Start: 2024-09-12 | End: 2024-09-18

## 2024-09-11 RX ORDER — MAGNESIUM OXIDE 400 MG/1
400 TABLET ORAL EVERY 4 HOURS
Status: DISPENSED | OUTPATIENT
Start: 2024-09-11 | End: 2024-09-11

## 2024-09-11 RX ORDER — FUROSEMIDE 20 MG
20 TABLET ORAL DAILY
Status: DISCONTINUED | OUTPATIENT
Start: 2024-09-12 | End: 2024-09-15

## 2024-09-11 RX ADMIN — ESCITALOPRAM OXALATE 10 MG: 10 TABLET ORAL at 08:20

## 2024-09-11 RX ADMIN — MAGNESIUM OXIDE TAB 400 MG (241.3 MG ELEMENTAL MG) 400 MG: 400 (241.3 MG) TAB at 08:20

## 2024-09-11 RX ADMIN — ACETAMINOPHEN 650 MG: 325 TABLET ORAL at 22:56

## 2024-09-11 RX ADMIN — POTASSIUM CHLORIDE 20 MEQ: 750 TABLET, EXTENDED RELEASE ORAL at 08:19

## 2024-09-11 RX ADMIN — DIGOXIN 125 MCG: 125 TABLET ORAL at 08:20

## 2024-09-11 RX ADMIN — LIDOCAINE 1 PATCH: 4 PATCH TOPICAL at 19:54

## 2024-09-11 RX ADMIN — CLOPIDOGREL BISULFATE 75 MG: 75 TABLET ORAL at 08:20

## 2024-09-11 RX ADMIN — ACETAMINOPHEN 650 MG: 325 TABLET ORAL at 05:17

## 2024-09-11 RX ADMIN — Medication 6.25 MG: at 15:58

## 2024-09-11 RX ADMIN — Medication 5 MG: at 19:54

## 2024-09-11 RX ADMIN — FUROSEMIDE 40 MG: 40 TABLET ORAL at 08:20

## 2024-09-11 RX ADMIN — ACETAMINOPHEN 650 MG: 325 TABLET ORAL at 09:27

## 2024-09-11 RX ADMIN — ATORVASTATIN CALCIUM 80 MG: 80 TABLET, FILM COATED ORAL at 08:20

## 2024-09-11 RX ADMIN — Medication 1 TABLET: at 19:54

## 2024-09-11 RX ADMIN — ACETAMINOPHEN 650 MG: 325 TABLET ORAL at 17:25

## 2024-09-11 RX ADMIN — Medication 1 TABLET: at 08:19

## 2024-09-11 RX ADMIN — MAGNESIUM OXIDE TAB 400 MG (241.3 MG ELEMENTAL MG) 400 MG: 400 (241.3 MG) TAB at 10:30

## 2024-09-11 RX ADMIN — OXYCODONE HYDROCHLORIDE AND ACETAMINOPHEN 1000 MG: 500 TABLET ORAL at 08:20

## 2024-09-11 RX ADMIN — GABAPENTIN 100 MG: 100 CAPSULE ORAL at 19:58

## 2024-09-11 RX ADMIN — Medication 6.25 MG: at 08:21

## 2024-09-11 RX ADMIN — Medication 6.25 MG: at 12:10

## 2024-09-11 RX ADMIN — ACETAMINOPHEN 650 MG: 325 TABLET ORAL at 01:03

## 2024-09-11 RX ADMIN — AMIODARONE HYDROCHLORIDE 200 MG: 200 TABLET ORAL at 08:20

## 2024-09-11 ASSESSMENT — ACTIVITIES OF DAILY LIVING (ADL)
ADLS_ACUITY_SCORE: 26
ADLS_ACUITY_SCORE: 25
ADLS_ACUITY_SCORE: 26
ADLS_ACUITY_SCORE: 26
ADLS_ACUITY_SCORE: 25
ADLS_ACUITY_SCORE: 26
ADLS_ACUITY_SCORE: 25
ADLS_ACUITY_SCORE: 26
ADLS_ACUITY_SCORE: 25
ADLS_ACUITY_SCORE: 26
ADLS_ACUITY_SCORE: 25
ADLS_ACUITY_SCORE: 26
ADLS_ACUITY_SCORE: 25

## 2024-09-11 NOTE — PROGRESS NOTES
Calorie Count  Intake recorded for: 9/10  Total Kcals: 2420 Total Protein: 195g  Kcals from Hospital Food: 1960  Protein: 111g  Kcals from Outside Food (average):460 Protein: 84g  # Meals Ordered from Kitchen: 3 meals   # Meals Recorded: 3 meals (First - 100% scrambled eggs, 2 Greek yogurts, whole milk)       (Second - 100% 2 puddings, fruit ice)       (Third - 100% Greek yogurt, ice cream)  # Supplements Recorded: 100% 2.75 Ensure Enlives; 2 [employer] protein shakes from outside the hospital

## 2024-09-11 NOTE — PROGRESS NOTES
[facility]  Cardiology II Service / Advanced Heart Failure  Daily Progress Note    Patient: Duane C Johnson      : 1950      MRN: [id number]    Assessment/Plan:   Duane C Johnson is a 74 year old male pmhx of anterior STEMI s/p PCI to the pLAD on 10/26/23 with a VT/VF arrest the next day (10/27) with patent stents and unchanged anatomy on repeat angiogram. Placed on amiodarone and discharged 23, ICD was not indicated as this was within 48h of his MI. His EF prior to discharge was 20-30% with a large area of akinesis in the LAD, placed on apixaban due to this (Apixaban dcd on 24). Has had multiple admissions for HF exacerbation last year.  Admitted on 24. He presents for 2 weeks of worsening fatigue; found to have BNP of 16k and L pleural effusion. Admitted for diuresis and RHC. CPX and RHC showed significant functional limitations due to heart failue. Plan for DT VAD while inpatient.     Today's changes:  - Discontinue clopidogrel   - Start aspirin 81mg   - Decrease diuresis to lasix 20mg daily based on euvolemia/uptrending BUN    # Acute heart failure exacerbation  #Possible ambulatory shock  # HFrEf 2/2 ICM (EF 15-20% by TTE 24)  Fluid status: Euvolemic  ACEi/ARB/ARNi/afterload reduction: Captopril 6.25 mg TID, hold for SBP < 90  BB: Hold metoprolol due to reduced CO and borderline BP  Aldosterone antagonist: unable to start d/t hypotension   SGLT2i: Empagliflozin 10mg (held on  for possible surgeries)  SCD prophylaxis: s/p ICD 2024  NSAID use: contraindicated  Antiarrhythmic: Amiodarone 200mg   Diuretic: Started 40 mg daily Lasix for maintenance on     Echo from  with EF 15-20% and trace MR. See below for full report.    RHC from 9/3 showing normal R sided pressures, mildly increased L sided pressures, reduced CO. See below for full report.  BP as low as 84/62 (MAP 69), has remained asymptomatic other than fatigue and mild SOB.     - BNP of 16k on admission with L sided pleural effusion   - Continuing to hold Metoprolol   - Reduce to PO Lasix 20 mg daily for maintenance   - Low intensity heparin gtt.    - Restarted after dental extractions    - Continue Captopril 6.25 TID    - Continue Digoxin 125mcg    - cMRI as below for LVAD eval    #AFib  # h/o VT/VF arrest in 2023  # ICD placed on 5/8/24  Was on apixaban for low EF (akinesis of the mid-distal anterior,mid anteroseptum, distal septum and the apex) and questionable history of Afib. Since there was no episode of Afib captured apixaban was stopped on 7/5/24. Device interrogation showing AF episode started on 8/28/24. Converted to A-paced rhythm after cardioversion on 9/5. Back in AF on 9/7.   - PTA Amio 200, Magnesium 400, Potassium 20  - Holding PTA Metoprolol   - Mag and Potassium replacement per nursing protocol  - Had successful cardioversion on 9/5   > Back in Afib on 9/7. Maintaining regular rates and asymptomatic    #LVAD evaluation   Pt has received financial clearance and LVAD evaluation is proceeding.  - Dental evaluation shows carries in all remaining teeth. Has completed extraction.  - Urology consulted for history of prostate cancer. Has completed therapy and last PSA was undetectable. This is not an obstacle to LVAD  - Abd US without evidence of chronic liver disease.   - No significant carotid stenosis   - ABIs normal   - Inpatient neuropsych eval requested (not absolutely necessary)   - Optimizing nutritional status   - cMRI to definitively rule out thrombus given recent afib    # CAD s/p PCI to LAD  # STEMI  Anterior STEMI on 10/26/23 with a VT/VF arrest the next day. Repeat angio showed patent stents and unchanged anatomy.   - Stop clopidogrel (finished course)  - Start aspirin 81mg  - Continue PTA Atorvastatin 80    #DOMENICA - Resolved  Baseline Cr normal at ~1. Cr of 1.55 on 8/24 with this acute episode of HF. Concern for type 1 cardiorenal syndrome. uPCR negative.   -  Diuresis as above  - avoid nephrotoxins    # Moderate malnutrition  - Nutrition consulted to help manage  - Encourage high protein drinks     ================================  Chronic Problems:    #Prostate Cancer  Diagnosed in 2023. Underwent Leuprolide on 3/19/24 and 24. Completed radiation treatment on 24   - Consider testosterone level as outpatient to assess for recovery after ADT    #L inguinal hernia  Pt reports this has been present for some time. Not causing any pain. Not a surgical candidate with current HF status so will not consult surgery at this time.     Hospital Checklist:  DVT Prophylaxis: Heparin gtt  Code Status: Full Code  Bowel regimen: PRN  Diet/Nutrition: 2L fluid and 2G sodium restriction.   Lines: Left PIV    Disposition Plannin+ days for LVAD evaluation    Staffed w/ Dr. Cruz.    Cesia Park MD   PGY-2, Internal Medicine  Cards 2 Service  [facility]    2024  ==================================    Subjective:   Feeling well today. Looking forward to visit to dental clinic today. Awaiting LVAD procedure.    Objective:     Vitals:    24 0415 09/10/24 0400 24 0517   Weight: 62.9 kg (138 lb 9.6 oz) 62.6 kg (137 lb 14.4 oz) 63.4 kg (139 lb 11.2 oz)     Vital Signs with Ranges  Temp:  [97.5  F (36.4  C)-98.9  F (37.2  C)] 98.8  F (37.1  C)  Pulse:  [63-76] 76  Resp:  [16-20] 18  BP: ()/(66-75) 119/75  SpO2:  [94 %-100 %] 100 %  I/O last 3 completed shifts:  In: 1076 [P.O.:1076]  Out: 1255 [Urine:1255]    GENERAL: Sitting in chair, no acute distress  HEENT: Atraumatic, moist mucus membranes, PERRL  RESP: Unlabored breathing on room air, no wheezes  CARDIO: Regular rate and rhythm, extremities warm and well perfused, no peripheral edema  ABD: Non-distended, non-tender, bowel sounds active  NEURO: AAOx3, cranial nerves grossly intact, no focal deficits      Medications   Current Facility-Administered Medications   Medication Dose Route  Frequency Provider Last Rate Last Admin    [Held by provider] heparin 25,000 units in 0.45% NaCl 250 mL ANTICOAGULANT infusion  0-5,000 Units/hr Intravenous Continuous Cristobal Fisher MD   Stopped at 09/10/24 1240     Current Facility-Administered Medications   Medication Dose Route Frequency Provider Last Rate Last Admin    amiodarone (PACERONE) tablet 200 mg  200 mg Oral Daily Cristobal Fisher MD   200 mg at 09/11/24 0820    [START ON 9/12/2024] aspirin EC tablet 81 mg  81 mg Oral Daily Vladimir Johnson MD        atorvastatin (LIPITOR) tablet 80 mg  80 mg Oral Daily Cristobal Fisher MD   80 mg at 09/11/24 0820    captopril (CAPOTEN) half-tab 6.25 mg  6.25 mg Oral TID AC Cristobal Fisher MD   6.25 mg at 09/11/24 1558    digoxin (LANOXIN) tablet 125 mcg  125 mcg Oral Daily Danial Fofana MD   125 mcg at 09/11/24 0820    escitalopram (LEXAPRO) tablet 10 mg  10 mg Oral Daily Cristobal Fisher MD   10 mg at 09/11/24 0820    [START ON 9/12/2024] furosemide (LASIX) tablet 20 mg  20 mg Oral Daily Vladimir Johnson MD        gabapentin (NEURONTIN) capsule 100 mg  100 mg Oral At Bedtime Cristobal Fisher MD   100 mg at 09/10/24 2209    Lidocaine (LIDOCARE) 4 % Patch 1 patch  1 patch Transdermal Q24h Danial Fofana MD   1 patch at 09/10/24 0654    magnesium oxide (MAG-OX) tablet 400 mg  400 mg Oral Q4H Cristobal Fisher MD   400 mg at 09/11/24 1030    magnesium oxide (MAG-OX) tablet 400 mg  400 mg Oral Daily Cristobal Fisher MD   400 mg at 09/11/24 0820    multivitamin w/minerals (THERA-VIT-M) tablet 1 tablet  1 tablet Oral BID Cristobal Fisher MD   1 tablet at 09/11/24 0819    potassium chloride tray ER (KLOR-CON M10) CR tablet 20 mEq  20 mEq Oral Daily Cristobal Fisher MD   20 mEq at 09/11/24 0819    sodium chloride (PF) 0.9% PF flush 10 mL  10 mL Intravenous Once Vladimir Johnson MD        vitamin C (ASCORBIC ACID) tablet 1,000 mg  1,000 mg Oral Daily Cristobal Fisher MD   1,000 mg at 09/11/24 0820       Data   Recent Labs   Lab  24  0539 09/10/24  0530 24  0545   WBC 9.2 10.5 11.7*   HGB 9.8* 9.6* 9.5*   MCV 98 97 97    303 320   INR 1.15 1.15 1.17*   * 133* 135   POTASSIUM 4.6 4.5 4.5   CHLORIDE 99 98 101   CO2 27 26 26   BUN 43.9* 41.7* 36.4*   CR 0.81 0.88 0.88   ANIONGAP 8 9 8   PRANAV 8.8 8.7* 8.7*   * 117* 113*   ALBUMIN 3.1* 2.9* 3.1*   PROTTOTAL 6.9 6.5 6.7   BILITOTAL 0.3 0.3 0.3   ALKPHOS 109 107 94   ALT 40 35 33   AST 37 38 35     RHC 9/3/24    RA:   RV:39/9  PA:  PCWP:16/22/15    Pa Sat:48.2%  Goldy; CO/CI: 2.91/1.71  Thermodilution; CO/CI:3.53/2.08   Right sided filling pressures are normal.   Left sided filling pressures are mildly elevated. Mild elevated pulmonary hypertension.   Reduced cardiac output level.         Echocardiogram Complete   Result Value    LVEF  15-20% (severely reduced)    [facility]    [id number]  [id number]  [id number]  [id number]^ROXANA^SONIA     [facility],[place]  Echocardiography Laboratory  [address] [place] [id number]     Name: JOHNSON, DUANE C  MRN: [id number]  : 1950  Study Date: 2024 08:20 AM  Age: 74 yrs  Gender: Male  Patient Location: [facility]  Reason For Study: CHF  Ordering Physician: SONIA SCHMIDT  Performed By: Yessenia Cano RDCS     BSA: 1.7 m2  Height: 66 in  Weight: 145 lb  HR: 71  BP: 94/73 mmHg  ______________________________________________________________________________  Procedure  Complete Portable Echo Adult. Contrast Optison. Optison (NDC #3714-2315-25)  given intravenously. Patient was given 6 ml mixture of 3 ml Optison and 6 ml  saline. 3 ml wasted.  ______________________________________________________________________________  Interpretation Summary  Left ventricular function is decreased. The ejection fraction is 15-20%  (severely reduced).  Moderate left ventricular dilation is present.  Apical wall akinesis is present.  Severe diffuse hypokinesis is present.  There is no thrombus  seen in the left ventricle.  The right ventricle is normal size.  Global right ventricular function is normal.  Mild functional mitral insufficiency is present.  Mild to moderate tricuspid functional insufficiency is present.  Pulmonary hypertension is present.The right ventricular systolic pressure is  40mmHg above the right atrial pressure.  IVC diameter and respiratory changes fall into an intermediate range  suggesting an RA pressure of 8 mmHg.     This study was compared with the study from 2/19/2024 .MR, TR improved. LVEF  similar in both studies compared side to side. So overall no change in LVEF  ______________________________________________________________________________  Left Ventricle  Left ventricular wall thickness is normal. Moderate left ventricular dilation  is present. Left ventricular diastolic function is not assessable. Left  ventricular function is decreased. The ejection fraction is 15-20% (severely  reduced). Apical wall akinesis is present. Severe diffuse hypokinesis is  present. There is no thrombus seen in the left ventricle.     Right Ventricle  The right ventricle is normal size. Global right ventricular function is  normal. A pacemaker lead is noted in the right ventricle.     Atria  Moderate left atrial enlargement is present. Moderate right atrial enlargement  is present.     Mitral Valve  Mild mitral insufficiency is present.     Aortic Valve  Trileaflet aortic sclerosis without stenosis. On Doppler interrogation, there  is no significant stenosis or regurgitation.     Tricuspid Valve  Mild to moderate tricuspid insufficiency is present. The right ventricular  systolic pressure is approximated at 39.7 mmHg plus the right atrial pressure.  Pulmonary hypertension is present. The right ventricular systolic pressure is  40mmHg above the right atrial pressure.     Pulmonic Valve  On Doppler interrogation, there is no significant stenosis or regurgitation.     Vessels  The aorta root is  normal. IVC diameter and respiratory changes fall into an  intermediate range suggesting an RA pressure of 8 mmHg.     Pericardium  No pericardial effusion is present.     Compared to Previous Study  This study was compared with the study from 2024 . MR, TR improved. LVEF  similar in both studies compared side to side. So overall no change in LVEF.  ______________________________________________________________________________  MMode/2D Measurements & Calculations     IVSd: 0.89 cm  LVIDd: 6.0 cm  LVIDs: 5.5 cm  LVPWd: 0.79 cm  FS: 7.1 %  LV mass(C)d: 197.0 grams  LV mass(C)dI: 112.9 grams/m2  LVOT diam: 2.0 cm  LVOT area: 3.2 cm2  EF Biplane: 16.8 %  LA Volume (BP): 71.3 ml  LA Volume Index (BP): 41.0 ml/m2  RV Base: 4.6 cm  RWT: 0.27     TAPSE: 1.0 cm     Doppler Measurements & Calculations  MV E max ivan: 82.7 cm/sec  LV V1 max P.9 mmHg  LV V1 max: 84.2 cm/sec  LV V1 VTI: 13.8 cm  MR PISA: 4.2 cm2  MR ERO: 0.33 cm2  MR volume: 35.7 ml  SV(LVOT): 43.8 ml  SI(LVOT): 25.1 ml/m2  PA acc time: 0.08 sec  TR max ivan: 315.1 cm/sec  TR max P.7 mmHg  RV S Ivan: 10.7 cm/sec     ______________________________________________________________________________  Report approved by: Jeanine MEJIA 2024 11:05 AM            Examination: XR CHEST 2 VIEWS 2024 3:06 PM     Indication: Shortness of breath     Comparison: Radiograph 2024. CT 2023.     Findings:  PA and lateral views of the chest. Left chest wall implantable cardiac  defibrillator with grossly intact leads/shock coil. Coronary stenting.  Trachea is midline. Stable mild cardiomegaly. Mild blunting of the  left costophrenic angle, likely representing small pleural effusion.  No pneumothorax. No focal consolidation or any definite abnormal  airspace opacities. No acute or suspicious osseous abnormality.  Unremarkable visualized abdomen.      Impression   Impression:   1. Stable mild cardiomegaly with implantable cardiac defibrillator  and  coronary stenting.  2. Small left pleural effusion.     I have personally reviewed the examination and initial interpretation  and I agree with the findings.     PANCHITO EID MD         SYSTEM ID:  [id number]      12/6/2023 CMR  Clinical history: 73 year old with anterior STEMI, cardiac arrest in Oct 2023  Comparison CMR: none  1. The left ventricle is severely enlarged. There is wall thinning and akinesis of the mid-distal anterior,  mid anteroseptum, distal septum and the apex  The global systolic function is severely reduced. The LVEF is 28%.  2. The right ventricle is normal in cavity size. The global systolic function is normal. The RVEF is 52%.   3. The right atrium is normal and the left atrium is severely enlarged.  4. There is mild mitral regurgitation.   5. There is transmural late gadolinium enhancement in mid-distal anterior, mid anteroseptum, distal  septum and the apex consistent with a large infarction in the LAD territory.   6. There is no pericardial effusion.  7. There is no intracardiac thrombus.  8. There are bilateral pleural effusions.  CONCLUSIONS:   Ischemic cardiomyopathy with a large anteroapical infarction.   Severely reduced left ventricular function and normal right ventricular function, LVEF 28% and RVEF 52%.     Cardiac Catheterization:  10/26/23    1st Mrg lesion is 20% stenosed.    Prox LAD to Mid LAD lesion is 100% stenosed.    1st Diag-1 lesion is 80% stenosed.    1st Diag-2 lesion is 50% stenosed.    Dist LAD lesion is 100% stenosed.    RPDA lesion is 70% stenosed.    Dist RCA lesion is 40% stenosed.     Anterior STEMI  Two vessel obstructive CAD (100% pLAD occlusion, 70% rPDA stenosis, 80% diagonal 1 stenosis)  PCI of pLAD to mLAD with BRENDA x 1 (Synergy 2.50x 28 mm) post dilated to 2.75 vessel  CVC placement in RIJ  LVEDP of 26 mmHg  Hemostasis of RRA with TR band      2/19/2024 Echo  Interpretation Summary     1. The left ventricle is moderately dilated. Left ventricular  hypertrophy is  noted by two-dimensional echocardiography. Proximal septal thickening is  noted. Left ventricular systolic function is moderate to severely reduced. The  visual ejection fraction is 25-30%. Biplane LVEF is 25%. Diastolic Doppler  findings (E/E' ratio and/or other parameters) suggest left ventricular filling  pressures are increased. There is severe hypokinesis to akinesis of the mid  anterior/anterolateral/anteroseptal/inferoseptal walls and all apical segments  of the left ventricle. There is no thrombus seen in the left ventricle.  2. The right ventricle is normal size. The right ventricular systolic function  is normal.  3. The left atrium is moderately dilated. Right atrial size is normal. There  is no color Doppler evidence of an atrial shunt.  4. There is moderate to mod-severe (2-3+) mitral regurgitation.  5. There is mild to moderate (1-2+) tricuspid regurgitation.Right ventricular  systolic pressure is elevated, consistent with moderate pulmonary  hypertension.  6. No pericardial effusion.  7. In direct comparison to the previous study dated 10/30/2023, there has been  an interval increase in the degree of mitral regurgitation. The other findings  are similar.     RHC 5/6/24  RA: 10/9/6 mmHg  RV: 61/11 mmHg  PA: 63/24/38 mmHg  PCWP: Unable to obtain accurate measurement  CO/CI (Goldy): 3.89/2.3 L/min/m2    PA sat: 64%  MAP: 85 mmHg       Right sided filling pressures are normal.    Mild elevated pulmonary hypertension.    Normal cardiac output level.        Device interrogated on 8/30/24  Device: [employer] D533 RESONATE HF ICD  Normal device function.  Mode: DDDR  bpm  AP: 33%  : 3%  Intrinsic rhythm: Afib w/ VS  bpm  Thoracic Impedance: Below reference line, suggests possible intrathoracic fluid accumulation.  Lead Trends Appear Stable.  Estimated battery longevity to RRT = 12.5 years.    Atrial Arrhythmia: AF episode began on 8/28/24  AF [place]: 11%  Anticoagulant:  Eliquis  Ventricular Arrhythmia: None  Setting Changes: Rate response turned ON in device, pacing mode changed to DDDR from DDD, fallback rate during AT/AF episodes 70 bpm.

## 2024-09-11 NOTE — PLAN OF CARE
Neuro: A&Ox4. Slept well overnight. Forgetful at times.  Cardiac: Afib 70s-80s; occasionally V-paced.   Respiratory: Sating well on RA. LS clear.   GI/: Good UOP via urinal. LBM 9/9/24.  Diet/Appetite: Regular diet; calorie counts.  Skin: Scattered ecchymosis.   LDA: L PIV, SL.  Activity: SBA.  Pain: Back pain 2-3/10. Using heat pad, PRN acetaminophen x 3.   Lab: Mg, K protocols. RN managed heparin - stopped for tooth extraction.    Plan: tooth extraction planned 9/11

## 2024-09-11 NOTE — PLAN OF CARE
"[id number] Goal Outcome Evaluation:    Overall Patient Progress: no change       VS:  BP 97/66 (BP Location: Right arm, Patient Position: Sitting, Cuff Size: Adult Small)   Pulse 71   Temp 97.8  F (36.6  C) (Oral)   Resp 20   Ht 1.676 m (5' 5.98\")   Wt 63.4 kg (139 lb 11.2 oz)   SpO2 100%   BMI 22.56 kg/m      Cardiac:  V-paced 75%, HR 70s, BP 90s-100s/60s-70s, denied CP or palpitations   Respiratory:  Sating >95% on RA, denied SOB, some DIOP w/ activity, LSCTA   GI/:  1 medium, formed, brown stool this shift; up to bathroom,  mL   Neuro:  A&O x4, denied new numbness or tingling, used call light appropriately   Pain:  Chronic lower back pain managed w/ PRN Tylenol Q4H, pt reported of new LUE arm pain 2/10 described as intermittent, aching pain   Activity:  SBA-Ind, steady on feet, walked becker this AM x2 w/ circulator RN & therapy   Skin:  WDL, no further skin issues noted   Dressing:  L PIV dressing CDI   Diet:  Mechanical/Dental soft w/ thin; denied N/V; 100% intake for breakfast;   PO intake 236 mL this shift   LDA:  L PIV SL   Equipment:  IV pole, IV pump, & personal belongings   Plan:  Con POC;   Per provider's note, LVAD tentatively scheduled for 9/18   Additional Info:  Pt had another teeth extraction scheduled for 1300 today w/ hep gtt held prior to procedure       "

## 2024-09-11 NOTE — PROGRESS NOTES
D/He insists the curved part of the straw goes in bottom of water cup. He is awake and alert. He says his mouth is okay, able to take sips of fluids and pills without difficulty  I/another RN gave him tylenol for reported back pain Heparin restarted without an initial bolus at previous rate post teeth extraction earlier today  P/recheck XA, monitor for changes, keep him and team updated

## 2024-09-12 ENCOUNTER — APPOINTMENT (OUTPATIENT)
Dept: PHYSICAL THERAPY | Facility: CLINIC | Age: 74
DRG: 001 | End: 2024-09-12
Payer: MEDICARE

## 2024-09-12 LAB
ALBUMIN SERPL BCG-MCNC: 3 G/DL (ref 3.5–5.2)
ALP SERPL-CCNC: 97 U/L (ref 40–150)
ALT SERPL W P-5'-P-CCNC: 38 U/L (ref 0–70)
ANION GAP SERPL CALCULATED.3IONS-SCNC: 8 MMOL/L (ref 7–15)
AST SERPL W P-5'-P-CCNC: 34 U/L (ref 0–45)
BASOPHILS # BLD AUTO: 0 10E3/UL (ref 0–0.2)
BASOPHILS NFR BLD AUTO: 0 %
BILIRUB SERPL-MCNC: 0.3 MG/DL
BUN SERPL-MCNC: 38.1 MG/DL (ref 8–23)
CALCIUM SERPL-MCNC: 8.7 MG/DL (ref 8.8–10.4)
CHLORIDE SERPL-SCNC: 97 MMOL/L (ref 98–107)
CREAT SERPL-MCNC: 0.79 MG/DL (ref 0.67–1.17)
EGFRCR SERPLBLD CKD-EPI 2021: >90 ML/MIN/1.73M2
EOSINOPHIL # BLD AUTO: 0 10E3/UL (ref 0–0.7)
EOSINOPHIL NFR BLD AUTO: 0 %
ERYTHROCYTE [DISTWIDTH] IN BLOOD BY AUTOMATED COUNT: 17.2 % (ref 10–15)
GLUCOSE SERPL-MCNC: 114 MG/DL (ref 70–99)
HCO3 SERPL-SCNC: 27 MMOL/L (ref 22–29)
HCT VFR BLD AUTO: 29.7 % (ref 40–53)
HGB BLD-MCNC: 9.4 G/DL (ref 13.3–17.7)
IMM GRANULOCYTES # BLD: 0.1 10E3/UL
IMM GRANULOCYTES NFR BLD: 1 %
INR PPP: 1.21 (ref 0.85–1.15)
LYMPHOCYTES # BLD AUTO: 0.5 10E3/UL (ref 0.8–5.3)
LYMPHOCYTES NFR BLD AUTO: 5 %
MAGNESIUM SERPL-MCNC: 1.9 MG/DL (ref 1.7–2.3)
MCH RBC QN AUTO: 30.7 PG (ref 26.5–33)
MCHC RBC AUTO-ENTMCNC: 31.6 G/DL (ref 31.5–36.5)
MCV RBC AUTO: 97 FL (ref 78–100)
MONOCYTES # BLD AUTO: 0.5 10E3/UL (ref 0–1.3)
MONOCYTES NFR BLD AUTO: 5 %
NEUTROPHILS # BLD AUTO: 8 10E3/UL (ref 1.6–8.3)
NEUTROPHILS NFR BLD AUTO: 89 %
NRBC # BLD AUTO: 0 10E3/UL
NRBC BLD AUTO-RTO: 0 /100
PLATELET # BLD AUTO: 250 10E3/UL (ref 150–450)
POTASSIUM SERPL-SCNC: 4.8 MMOL/L (ref 3.4–5.3)
PREALB SERPL-MCNC: 18 MG/DL (ref 20–40)
PROT SERPL-MCNC: 6.5 G/DL (ref 6.4–8.3)
RBC # BLD AUTO: 3.06 10E6/UL (ref 4.4–5.9)
SODIUM SERPL-SCNC: 132 MMOL/L (ref 135–145)
UFH PPP CHRO-ACNC: 0.25 IU/ML
UFH PPP CHRO-ACNC: 0.37 IU/ML
WBC # BLD AUTO: 9.1 10E3/UL (ref 4–11)

## 2024-09-12 PROCEDURE — 85520 HEPARIN ASSAY: CPT

## 2024-09-12 PROCEDURE — 272N000202 HC AEROBIKA WITH MANOMETER

## 2024-09-12 PROCEDURE — 36415 COLL VENOUS BLD VENIPUNCTURE: CPT

## 2024-09-12 PROCEDURE — 83735 ASSAY OF MAGNESIUM: CPT

## 2024-09-12 PROCEDURE — 97530 THERAPEUTIC ACTIVITIES: CPT | Mod: GP | Performed by: REHABILITATION PRACTITIONER

## 2024-09-12 PROCEDURE — 84134 ASSAY OF PREALBUMIN: CPT

## 2024-09-12 PROCEDURE — 80053 COMPREHEN METABOLIC PANEL: CPT

## 2024-09-12 PROCEDURE — 250N000013 HC RX MED GY IP 250 OP 250 PS 637

## 2024-09-12 PROCEDURE — 85025 COMPLETE CBC W/AUTO DIFF WBC: CPT

## 2024-09-12 PROCEDURE — 250N000011 HC RX IP 250 OP 636

## 2024-09-12 PROCEDURE — 94799 UNLISTED PULMONARY SVC/PX: CPT

## 2024-09-12 PROCEDURE — 97110 THERAPEUTIC EXERCISES: CPT | Mod: GP | Performed by: REHABILITATION PRACTITIONER

## 2024-09-12 PROCEDURE — 99233 SBSQ HOSP IP/OBS HIGH 50: CPT | Mod: GC | Performed by: INTERNAL MEDICINE

## 2024-09-12 PROCEDURE — 120N000003 HC R&B IMCU UMMC

## 2024-09-12 PROCEDURE — 250N000013 HC RX MED GY IP 250 OP 250 PS 637: Performed by: STUDENT IN AN ORGANIZED HEALTH CARE EDUCATION/TRAINING PROGRAM

## 2024-09-12 PROCEDURE — 85610 PROTHROMBIN TIME: CPT

## 2024-09-12 RX ORDER — METHOCARBAMOL 750 MG/1
750 TABLET, FILM COATED ORAL 3 TIMES DAILY PRN
Status: DISCONTINUED | OUTPATIENT
Start: 2024-09-12 | End: 2024-09-18

## 2024-09-12 RX ORDER — MAGNESIUM OXIDE 400 MG/1
400 TABLET ORAL EVERY 4 HOURS
Status: COMPLETED | OUTPATIENT
Start: 2024-09-12 | End: 2024-09-12

## 2024-09-12 RX ORDER — CHLORHEXIDINE GLUCONATE ORAL RINSE 1.2 MG/ML
15 SOLUTION DENTAL 2 TIMES DAILY
Status: DISCONTINUED | OUTPATIENT
Start: 2024-09-12 | End: 2024-09-18

## 2024-09-12 RX ADMIN — ATORVASTATIN CALCIUM 80 MG: 80 TABLET, FILM COATED ORAL at 08:11

## 2024-09-12 RX ADMIN — Medication 1 TABLET: at 08:12

## 2024-09-12 RX ADMIN — DIGOXIN 125 MCG: 125 TABLET ORAL at 08:10

## 2024-09-12 RX ADMIN — HEPARIN SODIUM 1300 UNITS/HR: 10000 INJECTION, SOLUTION INTRAVENOUS at 08:21

## 2024-09-12 RX ADMIN — LIDOCAINE 1 PATCH: 4 PATCH TOPICAL at 20:11

## 2024-09-12 RX ADMIN — Medication 5 MG: at 20:16

## 2024-09-12 RX ADMIN — ESCITALOPRAM OXALATE 10 MG: 10 TABLET ORAL at 08:09

## 2024-09-12 RX ADMIN — ASPIRIN 81 MG: 81 TABLET ORAL at 08:11

## 2024-09-12 RX ADMIN — Medication 6.25 MG: at 08:10

## 2024-09-12 RX ADMIN — ACETAMINOPHEN 650 MG: 325 TABLET ORAL at 11:25

## 2024-09-12 RX ADMIN — Medication 1 TABLET: at 20:11

## 2024-09-12 RX ADMIN — POTASSIUM CHLORIDE 20 MEQ: 750 TABLET, EXTENDED RELEASE ORAL at 08:09

## 2024-09-12 RX ADMIN — FUROSEMIDE 20 MG: 20 TABLET ORAL at 08:10

## 2024-09-12 RX ADMIN — CAPSAICIN: 0.25 CREAM TOPICAL at 08:25

## 2024-09-12 RX ADMIN — ACETAMINOPHEN 650 MG: 325 TABLET ORAL at 08:11

## 2024-09-12 RX ADMIN — Medication 6.25 MG: at 11:25

## 2024-09-12 RX ADMIN — MAGNESIUM OXIDE TAB 400 MG (241.3 MG ELEMENTAL MG) 400 MG: 400 (241.3 MG) TAB at 08:12

## 2024-09-12 RX ADMIN — ACETAMINOPHEN 650 MG: 325 TABLET ORAL at 20:14

## 2024-09-12 RX ADMIN — CHLORHEXIDINE GLUCONATE 0.12% ORAL RINSE 15 ML: 1.2 LIQUID ORAL at 20:11

## 2024-09-12 RX ADMIN — OXYCODONE HYDROCHLORIDE AND ACETAMINOPHEN 1000 MG: 500 TABLET ORAL at 08:11

## 2024-09-12 RX ADMIN — AMIODARONE HYDROCHLORIDE 200 MG: 200 TABLET ORAL at 08:11

## 2024-09-12 RX ADMIN — CAPSAICIN: 0.25 CREAM TOPICAL at 08:30

## 2024-09-12 RX ADMIN — MAGNESIUM OXIDE TAB 400 MG (241.3 MG ELEMENTAL MG) 400 MG: 400 (241.3 MG) TAB at 08:11

## 2024-09-12 RX ADMIN — METHOCARBAMOL 500 MG: 500 TABLET ORAL at 03:16

## 2024-09-12 RX ADMIN — Medication 6.25 MG: at 15:58

## 2024-09-12 RX ADMIN — GABAPENTIN 100 MG: 100 CAPSULE ORAL at 20:10

## 2024-09-12 RX ADMIN — MAGNESIUM OXIDE TAB 400 MG (241.3 MG ELEMENTAL MG) 400 MG: 400 (241.3 MG) TAB at 11:25

## 2024-09-12 RX ADMIN — ACETAMINOPHEN 650 MG: 325 TABLET ORAL at 03:16

## 2024-09-12 ASSESSMENT — ACTIVITIES OF DAILY LIVING (ADL)
ADLS_ACUITY_SCORE: 26

## 2024-09-12 NOTE — PROGRESS NOTES
Pre-VAD Social Work Services Progress Note      Date of Initial Social Work Evaluation: 09/05/2024  Collaborated with: Patient    Data: Duane was evaluated for LVAD and is remaining inpatient until he receives his VAD implant. He continues to work with therapies and working towards increasing his nutrition prior to LVAD implant. Duane had dental extractions the past 2 days. He was laying in his bed upon SW arrival and indicated feeling very tired.    Intervention: SW met with patient for supportive visit and to inquire about questions or concerns. Discussed 30-day care-giver support plan post VAD. SW informed Pt that he does not need to relocate from home due to being within the distance requirement.    Assessment: Duane was pleasant through discussion. He indicated finding the dental work the past couple days interesting . He discussed different entry points to the house (3 steps, 4 steps, and basement entrance with no steps and using electric chair to get upstairs). He indicated feeling extremely tired today and was catching himself from dozing off through conversation.  Education provided by SW: Ongoing SW availability and caregiver support/post VAD needs  Plan:    Discharge Plans in Progress: TBD    Barriers to d/c plan: VAD implant & medical condition    Follow up Plan: SW to continue to follow for any potential psychosocial concerns pre and post VAD implant.     Jessi Almendarez, MSW, LGSW, APSW  Heart Transplant/[facility]   [facility]  Ph. [phone]

## 2024-09-12 NOTE — PROGRESS NOTES
Calorie Count  Intake recorded for: 9/11  Total Kcals: 731 Total Protein: 55g  Kcals from Hospital Food: 731  Protein: 55g  Kcals from Outside Food (average):0 Protein: 0g  # Meals Ordered from Kitchen: 3 meals   # Meals Recorded: 2 meals (First - 100% scrambled eggs, 2 Greek yogurts, whole milk)       (Second - 100% cottage cheese, peaches, ice cream)   # Supplements Recorded: 0

## 2024-09-12 NOTE — PROGRESS NOTES
D/He says his mouth feels fine, and able to eat and drink, he has some chronic back pain and takes tylenol prn for this. He was napping but awoke easily. He continues on Heparin drip  A/monitor until VAD next week  P/monitor for changes, keep him and team updated

## 2024-09-12 NOTE — PROGRESS NOTES
[facility]  Cardiology II Service / Advanced Heart Failure  Daily Progress Note    Patient: Duane C Johnson      : 1950      MRN: [id number]    Assessment/Plan:   Duane C Johnson is a 74 year old male pmhx of anterior STEMI s/p PCI to the pLAD on 10/26/23 with a VT/VF arrest the next day (10/27) with patent stents and unchanged anatomy on repeat angiogram. Placed on amiodarone and discharged 23, ICD was not indicated as this was within 48h of his MI. His EF prior to discharge was 20-30% with a large area of akinesis in the LAD, placed on apixaban due to this (Apixaban dcd on 24). Has had multiple admissions for HF exacerbation last year.  Admitted on 24. He presents for 2 weeks of worsening fatigue; found to have BNP of 16k and L pleural effusion. Admitted for diuresis and RHC. CPX and RHC showed significant functional limitations due to heart failue. Plan for DT VAD while inpatient.     Today's changes:  - Wife will bring an air mattress tomorrow  - Increased Robaxin to 750mg TID  - Unfortunately we do not have providers that perform osteopathic manipulations currently that may be able to help with his back pain  - Added Chlorhexidine swish and spit BID until at least 3 weeks after cardiac surgery    # Acute heart failure exacerbation  #Possible ambulatory shock  # HFrEf 2/2 ICM (EF 15-20% by TTE 24)  Fluid status: Euvolemic  ACEi/ARB/ARNi/afterload reduction: Captopril 6.25 mg TID, hold for SBP < 90  BB: Hold metoprolol due to reduced CO and borderline BP  Aldosterone antagonist: unable to start d/t hypotension   SGLT2i: Empagliflozin 10mg (held on  for possible surgeries)  SCD prophylaxis: s/p ICD 2024  NSAID use: contraindicated  Antiarrhythmic: Amiodarone 200mg   Diuretic: 20mg Lasix Daily    Echo from  with EF 15-20% and trace MR. See below for full report.    RHC from 9/3 showing normal R sided pressures, mildly increased L  sided pressures, reduced CO. See below for full report.  BP as low as 84/62 (MAP 69), has remained asymptomatic other than fatigue and mild SOB.    - BNP of 16k on admission with L sided pleural effusion   - Continuing to hold Metoprolol   - Reduce to PO Lasix 20 mg daily for maintenance   - Low intensity heparin gtt.    - Restarted after dental extractions    - Continue Captopril 6.25 TID    - Continue Digoxin 125mcg    - cMRI as below for LVAD eval    #AFib  # h/o VT/VF arrest in 2023  # ICD placed on 5/8/24  Was on apixaban for low EF (akinesis of the mid-distal anterior,mid anteroseptum, distal septum and the apex) and questionable history of Afib. Since there was no episode of Afib captured apixaban was stopped on 7/5/24. Device interrogation showing AF episode started on 8/28/24. Converted to A-paced rhythm after cardioversion on 9/5. Back in AF on 9/7.   - PTA Amio 200, Magnesium 400, Potassium 20  - Holding PTA Metoprolol   - Mag and Potassium replacement per nursing protocol  - Had successful cardioversion on 9/5   > Back in Afib on 9/7. Maintaining regular rates and asymptomatic    #LVAD evaluation   Pt has received financial clearance and LVAD evaluation is proceeding.  - Dental evaluation shows carries in all remaining teeth. Has completed extraction.  - Urology consulted for history of prostate cancer. Has completed therapy and last PSA was undetectable. This is not an obstacle to LVAD  - Abd US without evidence of chronic liver disease.   - No significant carotid stenosis   - ABIs normal   - Inpatient neuropsych eval requested (not absolutely necessary)   - Optimizing nutritional status   - cMRI to definitively rule out thrombus given recent afib    # CAD s/p PCI to LAD  # STEMI  Anterior STEMI on 10/26/23 with a VT/VF arrest the next day. Repeat angio showed patent stents and unchanged anatomy.   - Stop clopidogrel (finished course)  - Start aspirin 81mg  - Continue PTA Atorvastatin 80    #DOMENICA -  Resolved  Baseline Cr normal at ~1. Cr of 1.55 on  with this acute episode of HF. Concern for type 1 cardiorenal syndrome. uPCR negative.   - Diuresis as above  - avoid nephrotoxins    # Moderate malnutrition  - Nutrition consulted to help manage  - Encourage high protein drinks     ================================  Chronic Problems:    #Prostate Cancer  Diagnosed in 2023. Underwent Leuprolide on 3/19/24 and 24. Completed radiation treatment on 24   - Consider testosterone level as outpatient to assess for recovery after ADT    #L inguinal hernia  Pt reports this has been present for some time. Not causing any pain. Not a surgical candidate with current HF status so will not consult surgery at this time.     Hospital Checklist:  DVT Prophylaxis: Heparin gtt  Code Status: Full Code  Bowel regimen: PRN  Diet/Nutrition: 2L fluid and 2G sodium restriction.   Lines: Left PIV    Disposition Plannin+ days for LVAD evaluation    Staffed w/ Dr. Cruz.    Cristobal Fisher MD   PGY-1 Internal Medicine  Cards 2 Service  [facility]    2024  ==================================    Subjective:   NAEO. Still had difficulty sleeping overnight due to back pain. Tylenol, Robaxin, and Icy-Hot helps somewhat. Wife is planning to bring an air mattress tomorrow. Continuing to walk the halls everyday and tolerating this well. No SOB, chest pain, or dizziness at rest.     Objective:     Vitals:    09/10/24 0400 24 0517 24 0435   Weight: 62.6 kg (137 lb 14.4 oz) 63.4 kg (139 lb 11.2 oz) 63 kg (138 lb 14.4 oz)     Vital Signs with Ranges  Temp:  [97.8  F (36.6  C)-98.8  F (37.1  C)] 97.9  F (36.6  C)  Pulse:  [71-76] 74  Resp:  [16-20] 16  BP: ()/(65-89) 103/89  SpO2:  [96 %-100 %] 100 %  I/O last 3 completed shifts:  In: 575.47 [P.O.:416; I.V.:159.47]  Out: 825 [Urine:825]    GENERAL: Sitting in chair, no acute distress  HEENT: Atraumatic, moist mucus membranes, PERRL  RESP: Unlabored  breathing on room air, no wheezes  CARDIO: Irregular rhythm, regular rate, extremities warm and well perfused, no peripheral edema  ABD: Non-distended, non-tender, bowel sounds active  NEURO: AAOx3, cranial nerves grossly intact, no focal deficits      Medications   Current Facility-Administered Medications   Medication Dose Route Frequency Provider Last Rate Last Admin    heparin 25,000 units in 0.45% NaCl 250 mL ANTICOAGULANT infusion  0-5,000 Units/hr Intravenous Continuous Cesia Park MD 13 mL/hr at 09/12/24 0000 1,300 Units/hr at 09/12/24 0000     Current Facility-Administered Medications   Medication Dose Route Frequency Provider Last Rate Last Admin    amiodarone (PACERONE) tablet 200 mg  200 mg Oral Daily Cristobal Fisher MD   200 mg at 09/11/24 0820    aspirin EC tablet 81 mg  81 mg Oral Daily Vladimir Johnson MD        atorvastatin (LIPITOR) tablet 80 mg  80 mg Oral Daily Cristobal Fisher MD   80 mg at 09/11/24 0820    captopril (CAPOTEN) half-tab 6.25 mg  6.25 mg Oral TID AC Cristobal Fisher MD   6.25 mg at 09/11/24 1558    digoxin (LANOXIN) tablet 125 mcg  125 mcg Oral Daily Danial Fofana MD   125 mcg at 09/11/24 0820    escitalopram (LEXAPRO) tablet 10 mg  10 mg Oral Daily Cristobal Fisher MD   10 mg at 09/11/24 0820    furosemide (LASIX) tablet 20 mg  20 mg Oral Daily Vladimir Johnson MD        gabapentin (NEURONTIN) capsule 100 mg  100 mg Oral At Bedtime Cristobal Fisher MD   100 mg at 09/11/24 1958    Lidocaine (LIDOCARE) 4 % Patch 1 patch  1 patch Transdermal Q24h Danial Fofana MD   1 patch at 09/11/24 1954    magnesium oxide (MAG-OX) tablet 400 mg  400 mg Oral Q4H Cristobal Fisher MD        magnesium oxide (MAG-OX) tablet 400 mg  400 mg Oral Daily Cristobal Fisher MD   400 mg at 09/11/24 0820    multivitamin w/minerals (THERA-VIT-M) tablet 1 tablet  1 tablet Oral BID Cristobal Fisher MD   1 tablet at 09/11/24 1954    potassium chloride tray ER (KLOR-CON M10) CR tablet 20 mEq  20 mEq Oral Daily Natalia,  MD Cristobal   20 mEq at 24 0819    sodium chloride (PF) 0.9% PF flush 10 mL  10 mL Intravenous Once Vladimir Johnson MD        vitamin C (ASCORBIC ACID) tablet 1,000 mg  1,000 mg Oral Daily Cristobal Schmidt MD   1,000 mg at 24 0820       Data   Recent Labs   Lab 24  0537 24  0539 09/10/24  0530   WBC 9.1 9.2 10.5   HGB 9.4* 9.8* 9.6*   MCV 97 98 97    304 303   INR 1.21* 1.15 1.15   * 134* 133*   POTASSIUM 4.8 4.6 4.5   CHLORIDE 97* 99 98   CO2 27 27 26   BUN 38.1* 43.9* 41.7*   CR 0.79 0.81 0.88   ANIONGAP 8 8 9   PRANAV 8.7* 8.8 8.7*   * 106* 117*   ALBUMIN 3.0* 3.1* 2.9*   PROTTOTAL 6.5 6.9 6.5   BILITOTAL 0.3 0.3 0.3   ALKPHOS 97 109 107   ALT 38 40 35   AST 34 37 38     RHC 9/3/24    RA:   RV:39/9  PA:  PCWP:16/22/15    Pa Sat:48.2%  Goldy; CO/CI: 2.91/1.71  Thermodilution; CO/CI:3.53/2.08   Right sided filling pressures are normal.   Left sided filling pressures are mildly elevated. Mild elevated pulmonary hypertension.   Reduced cardiac output level.         Echocardiogram Complete   Result Value    LVEF  15-20% (severely reduced)    [facility]    [id number]  [id number]  [id number]  [id number]^ROXANA^CRISTOBAL     [facility]  Echocardiography Laboratory  [address] [place] [id number]     Name: JOHNSON, DUANE C  MRN: [id number]  : 1950  Study Date: 2024 08:20 AM  Age: 74 yrs  Gender: Male  Patient Location: [facility]  Reason For Study: CHF  Ordering Physician: CRISTOBAL SCHMIDT  Performed By: Yessenia Cano RDCS     BSA: 1.7 m2  Height: 66 in  Weight: 145 lb  HR: 71  BP: 94/73 mmHg  ______________________________________________________________________________  Procedure  Complete Portable Echo Adult. Contrast Optison. Optison (NDC #6244-4959-49)  given intravenously. Patient was given 6 ml mixture of 3 ml Optison and 6 ml  saline. 3 ml  wasted.  ______________________________________________________________________________  Interpretation Summary  Left ventricular function is decreased. The ejection fraction is 15-20%  (severely reduced).  Moderate left ventricular dilation is present.  Apical wall akinesis is present.  Severe diffuse hypokinesis is present.  There is no thrombus seen in the left ventricle.  The right ventricle is normal size.  Global right ventricular function is normal.  Mild functional mitral insufficiency is present.  Mild to moderate tricuspid functional insufficiency is present.  Pulmonary hypertension is present.The right ventricular systolic pressure is  40mmHg above the right atrial pressure.  IVC diameter and respiratory changes fall into an intermediate range  suggesting an RA pressure of 8 mmHg.     This study was compared with the study from 2/19/2024 .MR, TR improved. LVEF  similar in both studies compared side to side. So overall no change in LVEF  ______________________________________________________________________________  Left Ventricle  Left ventricular wall thickness is normal. Moderate left ventricular dilation  is present. Left ventricular diastolic function is not assessable. Left  ventricular function is decreased. The ejection fraction is 15-20% (severely  reduced). Apical wall akinesis is present. Severe diffuse hypokinesis is  present. There is no thrombus seen in the left ventricle.     Right Ventricle  The right ventricle is normal size. Global right ventricular function is  normal. A pacemaker lead is noted in the right ventricle.     Atria  Moderate left atrial enlargement is present. Moderate right atrial enlargement  is present.     Mitral Valve  Mild mitral insufficiency is present.     Aortic Valve  Trileaflet aortic sclerosis without stenosis. On Doppler interrogation, there  is no significant stenosis or regurgitation.     Tricuspid Valve  Mild to moderate tricuspid insufficiency is present.  The right ventricular  systolic pressure is approximated at 39.7 mmHg plus the right atrial pressure.  Pulmonary hypertension is present. The right ventricular systolic pressure is  40mmHg above the right atrial pressure.     Pulmonic Valve  On Doppler interrogation, there is no significant stenosis or regurgitation.     Vessels  The aorta root is normal. IVC diameter and respiratory changes fall into an  intermediate range suggesting an RA pressure of 8 mmHg.     Pericardium  No pericardial effusion is present.     Compared to Previous Study  This study was compared with the study from 2024 . MR, TR improved. LVEF  similar in both studies compared side to side. So overall no change in LVEF.  ______________________________________________________________________________  MMode/2D Measurements & Calculations     IVSd: 0.89 cm  LVIDd: 6.0 cm  LVIDs: 5.5 cm  LVPWd: 0.79 cm  FS: 7.1 %  LV mass(C)d: 197.0 grams  LV mass(C)dI: 112.9 grams/m2  LVOT diam: 2.0 cm  LVOT area: 3.2 cm2  EF Biplane: 16.8 %  LA Volume (BP): 71.3 ml  LA Volume Index (BP): 41.0 ml/m2  RV Base: 4.6 cm  RWT: 0.27     TAPSE: 1.0 cm     Doppler Measurements & Calculations  MV E max ivan: 82.7 cm/sec  LV V1 max P.9 mmHg  LV V1 max: 84.2 cm/sec  LV V1 VTI: 13.8 cm  MR PISA: 4.2 cm2  MR ERO: 0.33 cm2  MR volume: 35.7 ml  SV(LVOT): 43.8 ml  SI(LVOT): 25.1 ml/m2  PA acc time: 0.08 sec  TR max ivan: 315.1 cm/sec  TR max P.7 mmHg  RV S Ivan: 10.7 cm/sec     ______________________________________________________________________________  Report approved by: Jeanine MEJIA 2024 11:05 AM            Examination: XR CHEST 2 VIEWS 2024 3:06 PM     Indication: Shortness of breath     Comparison: Radiograph 2024. CT 2023.     Findings:  PA and lateral views of the chest. Left chest wall implantable cardiac  defibrillator with grossly intact leads/shock coil. Coronary stenting.  Trachea is midline. Stable mild cardiomegaly. Mild  blunting of the  left costophrenic angle, likely representing small pleural effusion.  No pneumothorax. No focal consolidation or any definite abnormal  airspace opacities. No acute or suspicious osseous abnormality.  Unremarkable visualized abdomen.      Impression   Impression:   1. Stable mild cardiomegaly with implantable cardiac defibrillator and  coronary stenting.  2. Small left pleural effusion.     I have personally reviewed the examination and initial interpretation  and I agree with the findings.     PANCHITO EID MD         SYSTEM ID:  [id number]      12/6/2023 CMR  Clinical history: 73 year old with anterior STEMI, cardiac arrest in Oct 2023  Comparison CMR: none  1. The left ventricle is severely enlarged. There is wall thinning and akinesis of the mid-distal anterior,  mid anteroseptum, distal septum and the apex  The global systolic function is severely reduced. The LVEF is 28%.  2. The right ventricle is normal in cavity size. The global systolic function is normal. The RVEF is 52%.   3. The right atrium is normal and the left atrium is severely enlarged.  4. There is mild mitral regurgitation.   5. There is transmural late gadolinium enhancement in mid-distal anterior, mid anteroseptum, distal  septum and the apex consistent with a large infarction in the LAD territory.   6. There is no pericardial effusion.  7. There is no intracardiac thrombus.  8. There are bilateral pleural effusions.  CONCLUSIONS:   Ischemic cardiomyopathy with a large anteroapical infarction.   Severely reduced left ventricular function and normal right ventricular function, LVEF 28% and RVEF 52%.     Cardiac Catheterization:  10/26/23    1st Mrg lesion is 20% stenosed.    Prox LAD to Mid LAD lesion is 100% stenosed.    1st Diag-1 lesion is 80% stenosed.    1st Diag-2 lesion is 50% stenosed.    Dist LAD lesion is 100% stenosed.    RPDA lesion is 70% stenosed.    Dist RCA lesion is 40% stenosed.     Anterior STEMI  Two  vessel obstructive CAD (100% pLAD occlusion, 70% rPDA stenosis, 80% diagonal 1 stenosis)  PCI of pLAD to mLAD with BRENDA x 1 (Synergy 2.50x 28 mm) post dilated to 2.75 vessel  CVC placement in RIJ  LVEDP of 26 mmHg  Hemostasis of RRA with TR band      2/19/2024 Echo  Interpretation Summary     1. The left ventricle is moderately dilated. Left ventricular hypertrophy is  noted by two-dimensional echocardiography. Proximal septal thickening is  noted. Left ventricular systolic function is moderate to severely reduced. The  visual ejection fraction is 25-30%. Biplane LVEF is 25%. Diastolic Doppler  findings (E/E' ratio and/or other parameters) suggest left ventricular filling  pressures are increased. There is severe hypokinesis to akinesis of the mid  anterior/anterolateral/anteroseptal/inferoseptal walls and all apical segments  of the left ventricle. There is no thrombus seen in the left ventricle.  2. The right ventricle is normal size. The right ventricular systolic function  is normal.  3. The left atrium is moderately dilated. Right atrial size is normal. There  is no color Doppler evidence of an atrial shunt.  4. There is moderate to mod-severe (2-3+) mitral regurgitation.  5. There is mild to moderate (1-2+) tricuspid regurgitation.Right ventricular  systolic pressure is elevated, consistent with moderate pulmonary  hypertension.  6. No pericardial effusion.  7. In direct comparison to the previous study dated 10/30/2023, there has been  an interval increase in the degree of mitral regurgitation. The other findings  are similar.     RHC 5/6/24  RA: 10/9/6 mmHg  RV: 61/11 mmHg  PA: 63/24/38 mmHg  PCWP: Unable to obtain accurate measurement  CO/CI (Goldy): 3.89/2.3 L/min/m2    PA sat: 64%  MAP: 85 mmHg       Right sided filling pressures are normal.    Mild elevated pulmonary hypertension.    Normal cardiac output level.        Device interrogated on 8/30/24  Device: [employer] D533 RESONATE HF ICD  Normal  device function.  Mode: DDDR  bpm  AP: 33%  : 3%  Intrinsic rhythm: Afib w/ VS  bpm  Thoracic Impedance: Below reference line, suggests possible intrathoracic fluid accumulation.  Lead Trends Appear Stable.  Estimated battery longevity to RRT = 12.5 years.    Atrial Arrhythmia: AF episode began on 8/28/24  AF [place]: 11%  Anticoagulant: Eliquis  Ventricular Arrhythmia: None  Setting Changes: Rate response turned ON in device, pacing mode changed to DDDR from DDD, fallback rate during AT/AF episodes 70 bpm.

## 2024-09-12 NOTE — PLAN OF CARE
"[id number] Goal Outcome Evaluation:         Overall Patient Progress: improving    VS:  /72 (BP Location: Left arm)   Pulse 70   Temp 97.7  F (36.5  C) (Oral)   Resp 16   Ht 1.676 m (5' 5.98\")   Wt 63 kg (138 lb 14.4 oz)   SpO2 98%   BMI 22.43 kg/m      Cardiac:  V-paced 75%, HR 70s, BP 100s/70s-80s, denied CP or palpitations   Respiratory:  Sating >95% on RA, denied SOB, no DIOP reported or observed, LSCTA   GI/:  1 BM this shift, up to bathroom, voids adequately using urinal,  mL this shift   Neuro:  A&O x4, denied new numbness or tingling, used call light appropriately   Pain:  Chronic back pain managed w/ PRN Tylenol Q4H and ambulation/activity encouragement   Activity:  SBA-Ind, walked becker x2 this AM and x1 this PM    Skin:  WDL, no new deficits noted   Dressing:  L PIV dressing CDI   Diet:  Mechanical/Dental soft diet w/ thin; denied N/V; on raji count   100% intake for brunch, 355 mL PO intake this shift   LDA:  L PIV infusing hep gtt @ 1300 units/hr   Equipment:  IV pole, IV pump, & personal belongings   Plan:  Cont POC;   LVAD scheduled tentatively 9/18   Additional Info:  Mg replaced this shift, recheck in AM; 2nd Xa @ therapeutic level, recheck in AM       "

## 2024-09-12 NOTE — PROGRESS NOTES
CLINICAL NUTRITION SERVICES - REASSESSMENT NOTE     Nutrition Prescription    RECOMMENDATIONS FOR MDs/PROVIDERS TO ORDER:  If pt consuming less than 1050 kcals and 45 g protein daily on average, rec TFs pre-op.     Malnutrition Status:    Severe malnutrition in the context of chronic illness    Future/Additional Recommendations:  -Monitor need for sodium and fluid restrictions. Encourage pt to self-select tolerated foods/beverages (altered dentition). Rec small, frequent meals and use of oral supplements. Pt with increased protein needs.    -Consider iron supplementation if warranted.   -If pt needs TFs if/when post-op Tx, initiate TFs, Osmolite1.5 TF + Prosource TF 20, 2 pkts daily (concentrated, maintenance TF, or equivalent). Initiate TFs at 15 mL/hr, advancing rate by 10 mL Q 8 hrs (or per provider discretion, pending hemodynamic stability) to goal rate of 45 mL/hr. Osmolite 1.5 Vance (or equivalent) @ goal of 45ml/hr  (1080ml/day) + 2 pkts Prosource TF 20 provides: 1780 kcals, 107 g PRO, 822 ml free H20, 219 g CHO, and 0 g fiber daily.                            If begin tube feeds:                         - Flush FT with 30 mL water Q 4 hrs for patency. Rec provider adjust free water flushes as needed, pending fluid status.                        - Ensure K+/Mg++/Phos labs are ordered daily until TFs advance to goal infusion to evaluate for sx of refeeding with nutrition received. If lytes trend low, aggressively replace lytes.                            - If not already ordered, order a multivitamin/mineral (certavite 15 mL/day via FT) to help ensure micronutrient needs being met with suspected hypermetabolic demands and potential interruptions to TF infusions.                        - Monitor TF and possible need to adjust nutrition support regimen if necessary, pending medical course and nutrition status.        EVALUATION OF THE PROGRESS TOWARD GOALS   Diet: Mechanical/Dental Soft  with ensure enlive BID and  chocolate ice cream once daily   Intake: Duane reports his appetite is improving. He is taking the ensure enlive at least twice daily and eating everything on his tray per his report     Calorie counts (-) on average providin kcals (17 kcals/kg or 69% of estimated nutritional needs) and 95 gram protein (1.5 g/kg or 100+% of estimated nutritional needs) per dosing weight 62 kg      NEW FINDINGS   Labs (): Preablumin 18 mg/dL (L), Na 132 mmol/L (L), BUN 38.1 mg/dL (H), Cr 0.79 mg/dL     Meds:   Lipitor  Lasix  Mag-ox  Thera-vit-m   Klor-con M10  Vitamin C    GI: LBM ()    Weight: weight remains elevated since lowest weight this admission. Continue current dosing weight of 62 kg     MALNUTRITION  % Intake: </=75% for >/= 1 month (severe)   % Weight Loss: None noted  Subcutaneous Fat Loss: Facial region: Moderate, Arms: Severe   Muscle Loss: Temporal:  Severe, Thoracic region (clavicle, acromium bone, deltoid, trapezius, pectoral):  Moderate, and Dorsal hand:  Mild   Fluid Accumulation/Edema: None noted  Malnutrition Diagnosis: Severe malnutrition in the context of chronic illness    Previous Goals   Patient to consume % of nutritionally adequate meal trays TID, or the equivalent with supplements/snacks.  Evaluation: not met     Previous Nutrition Diagnosis  Predicted inadequate nutrient intake (protein-energy   Evaluation: No longer applicable, nutrition diagnosis changed below    CURRENT NUTRITION DIAGNOSIS  Malnutrition related to variable po intake as evidenced by NFPE       INTERVENTIONS  Implementation  Collaboration with other providers - discussed at interdisciplinary team rounds     Goals  Patient to consume % of nutritionally adequate meal trays TID, or the equivalent with supplements/snacks.    Monitoring/Evaluation  Progress toward goals will be monitored and evaluated per protocol.    Sindy Walker MS/RD/LD/CNSC  No longer available via pager  6C (beds 9171-5332 and  "[id number]): Rhett \"6C Clinical Dietitian\"   Weekend/Holiday: Vocera \"Weekend Clinical Dietitian\"                "

## 2024-09-12 NOTE — PLAN OF CARE
5754 - 2532      Neuro: A&Ox4. Able to make needs known.  Cardiac: Afebrile, VSS. 90% V paced with occ PVCs. Denies chest pain.   Respiratory: Sating >95% on RA. LS clear.  GI/: Voiding spontaneously. No BM this shift.  Diet/appetite: Mechanical soft diet, calorie count, 2L FR. Denies nausea.  Activity: Up with SBA.    Pain: Pt endorse chronic lower back pain. PRN tylenol and robaxin helped. Heat therapy in use.   Skin: Scattered bruises. No new deficits noted.  Lines: PIV on left forearm - infusing hep gtt at 1300 units/hr.  Replacements: Pot, mag, hep replacement protocol.     Plan: Continue with POC and update team with changes.

## 2024-09-13 LAB
ALBUMIN SERPL BCG-MCNC: 3.1 G/DL (ref 3.5–5.2)
ALP SERPL-CCNC: 108 U/L (ref 40–150)
ALT SERPL W P-5'-P-CCNC: 40 U/L (ref 0–70)
ANION GAP SERPL CALCULATED.3IONS-SCNC: 9 MMOL/L (ref 7–15)
AST SERPL W P-5'-P-CCNC: 35 U/L (ref 0–45)
BASOPHILS # BLD AUTO: 0 10E3/UL (ref 0–0.2)
BASOPHILS NFR BLD AUTO: 0 %
BILIRUB SERPL-MCNC: 0.3 MG/DL
BUN SERPL-MCNC: 45.9 MG/DL (ref 8–23)
CALCIUM SERPL-MCNC: 8.6 MG/DL (ref 8.8–10.4)
CHLORIDE SERPL-SCNC: 97 MMOL/L (ref 98–107)
CREAT SERPL-MCNC: 0.86 MG/DL (ref 0.67–1.17)
EGFRCR SERPLBLD CKD-EPI 2021: >90 ML/MIN/1.73M2
EOSINOPHIL # BLD AUTO: 0 10E3/UL (ref 0–0.7)
EOSINOPHIL NFR BLD AUTO: 0 %
ERYTHROCYTE [DISTWIDTH] IN BLOOD BY AUTOMATED COUNT: 17.5 % (ref 10–15)
GLUCOSE SERPL-MCNC: 114 MG/DL (ref 70–99)
HCO3 SERPL-SCNC: 25 MMOL/L (ref 22–29)
HCT VFR BLD AUTO: 31.5 % (ref 40–53)
HGB BLD-MCNC: 10.2 G/DL (ref 13.3–17.7)
IMM GRANULOCYTES # BLD: 0.1 10E3/UL
IMM GRANULOCYTES NFR BLD: 1 %
INR PPP: 1.17 (ref 0.85–1.15)
LYMPHOCYTES # BLD AUTO: 0.5 10E3/UL (ref 0.8–5.3)
LYMPHOCYTES NFR BLD AUTO: 5 %
MAGNESIUM SERPL-MCNC: 2 MG/DL (ref 1.7–2.3)
MCH RBC QN AUTO: 31.4 PG (ref 26.5–33)
MCHC RBC AUTO-ENTMCNC: 32.4 G/DL (ref 31.5–36.5)
MCV RBC AUTO: 97 FL (ref 78–100)
MONOCYTES # BLD AUTO: 0.6 10E3/UL (ref 0–1.3)
MONOCYTES NFR BLD AUTO: 5 %
NEUTROPHILS # BLD AUTO: 10 10E3/UL (ref 1.6–8.3)
NEUTROPHILS NFR BLD AUTO: 89 %
NRBC # BLD AUTO: 0 10E3/UL
NRBC BLD AUTO-RTO: 0 /100
PLATELET # BLD AUTO: 276 10E3/UL (ref 150–450)
POTASSIUM SERPL-SCNC: 5 MMOL/L (ref 3.4–5.3)
PROT SERPL-MCNC: 6.8 G/DL (ref 6.4–8.3)
RBC # BLD AUTO: 3.25 10E6/UL (ref 4.4–5.9)
SODIUM SERPL-SCNC: 131 MMOL/L (ref 135–145)
UFH PPP CHRO-ACNC: 0.27 IU/ML
UFH PPP CHRO-ACNC: 0.29 IU/ML
UFH PPP CHRO-ACNC: 0.52 IU/ML
WBC # BLD AUTO: 11.3 10E3/UL (ref 4–11)

## 2024-09-13 PROCEDURE — 85520 HEPARIN ASSAY: CPT

## 2024-09-13 PROCEDURE — 250N000011 HC RX IP 250 OP 636

## 2024-09-13 PROCEDURE — 83735 ASSAY OF MAGNESIUM: CPT

## 2024-09-13 PROCEDURE — 250N000013 HC RX MED GY IP 250 OP 250 PS 637

## 2024-09-13 PROCEDURE — 85610 PROTHROMBIN TIME: CPT

## 2024-09-13 PROCEDURE — 99233 SBSQ HOSP IP/OBS HIGH 50: CPT | Mod: GC | Performed by: INTERNAL MEDICINE

## 2024-09-13 PROCEDURE — 120N000003 HC R&B IMCU UMMC

## 2024-09-13 PROCEDURE — 36415 COLL VENOUS BLD VENIPUNCTURE: CPT

## 2024-09-13 PROCEDURE — 85004 AUTOMATED DIFF WBC COUNT: CPT

## 2024-09-13 PROCEDURE — 250N000013 HC RX MED GY IP 250 OP 250 PS 637: Performed by: STUDENT IN AN ORGANIZED HEALTH CARE EDUCATION/TRAINING PROGRAM

## 2024-09-13 PROCEDURE — 80053 COMPREHEN METABOLIC PANEL: CPT

## 2024-09-13 RX ORDER — MAGNESIUM OXIDE 400 MG/1
400 TABLET ORAL EVERY 4 HOURS
Status: COMPLETED | OUTPATIENT
Start: 2024-09-13 | End: 2024-09-13

## 2024-09-13 RX ORDER — ACETAMINOPHEN 500 MG
1000 TABLET ORAL EVERY 8 HOURS
Status: DISCONTINUED | OUTPATIENT
Start: 2024-09-13 | End: 2024-09-18

## 2024-09-13 RX ORDER — POTASSIUM CHLORIDE 750 MG/1
10 TABLET, EXTENDED RELEASE ORAL DAILY
Status: DISCONTINUED | OUTPATIENT
Start: 2024-09-14 | End: 2024-09-18

## 2024-09-13 RX ADMIN — Medication 6.25 MG: at 17:06

## 2024-09-13 RX ADMIN — METHOCARBAMOL 750 MG: 750 TABLET ORAL at 13:50

## 2024-09-13 RX ADMIN — CHLORHEXIDINE GLUCONATE 0.12% ORAL RINSE 15 ML: 1.2 LIQUID ORAL at 20:00

## 2024-09-13 RX ADMIN — OXYCODONE HYDROCHLORIDE AND ACETAMINOPHEN 1000 MG: 500 TABLET ORAL at 08:16

## 2024-09-13 RX ADMIN — MAGNESIUM OXIDE TAB 400 MG (241.3 MG ELEMENTAL MG) 400 MG: 400 (241.3 MG) TAB at 08:16

## 2024-09-13 RX ADMIN — HEPARIN SODIUM 1100 UNITS/HR: 10000 INJECTION, SOLUTION INTRAVENOUS at 23:49

## 2024-09-13 RX ADMIN — DIGOXIN 125 MCG: 125 TABLET ORAL at 08:15

## 2024-09-13 RX ADMIN — Medication 6.25 MG: at 13:28

## 2024-09-13 RX ADMIN — ATORVASTATIN CALCIUM 80 MG: 80 TABLET, FILM COATED ORAL at 08:17

## 2024-09-13 RX ADMIN — ACETAMINOPHEN 650 MG: 325 TABLET ORAL at 13:50

## 2024-09-13 RX ADMIN — MAGNESIUM OXIDE TAB 400 MG (241.3 MG ELEMENTAL MG) 400 MG: 400 (241.3 MG) TAB at 13:28

## 2024-09-13 RX ADMIN — METHOCARBAMOL 750 MG: 750 TABLET ORAL at 03:23

## 2024-09-13 RX ADMIN — Medication 6.25 MG: at 08:15

## 2024-09-13 RX ADMIN — ASPIRIN 81 MG: 81 TABLET ORAL at 08:15

## 2024-09-13 RX ADMIN — Medication 5 MG: at 22:10

## 2024-09-13 RX ADMIN — CAPSAICIN: 0.25 CREAM TOPICAL at 13:54

## 2024-09-13 RX ADMIN — ESCITALOPRAM OXALATE 10 MG: 10 TABLET ORAL at 08:17

## 2024-09-13 RX ADMIN — Medication 1 TABLET: at 20:00

## 2024-09-13 RX ADMIN — ACETAMINOPHEN 650 MG: 325 TABLET ORAL at 01:22

## 2024-09-13 RX ADMIN — DICLOFENAC SODIUM 2 G: 10 GEL TOPICAL at 15:55

## 2024-09-13 RX ADMIN — FUROSEMIDE 20 MG: 20 TABLET ORAL at 08:16

## 2024-09-13 RX ADMIN — CHLORHEXIDINE GLUCONATE 0.12% ORAL RINSE 15 ML: 1.2 LIQUID ORAL at 08:15

## 2024-09-13 RX ADMIN — LIDOCAINE 1 PATCH: 4 PATCH TOPICAL at 19:58

## 2024-09-13 RX ADMIN — MAGNESIUM OXIDE TAB 400 MG (241.3 MG ELEMENTAL MG) 400 MG: 400 (241.3 MG) TAB at 08:19

## 2024-09-13 RX ADMIN — ACETAMINOPHEN 1000 MG: 500 TABLET ORAL at 21:06

## 2024-09-13 RX ADMIN — ACETAMINOPHEN 650 MG: 325 TABLET ORAL at 08:15

## 2024-09-13 RX ADMIN — AMIODARONE HYDROCHLORIDE 200 MG: 200 TABLET ORAL at 08:17

## 2024-09-13 RX ADMIN — GABAPENTIN 100 MG: 100 CAPSULE ORAL at 19:59

## 2024-09-13 RX ADMIN — POTASSIUM CHLORIDE 20 MEQ: 750 TABLET, EXTENDED RELEASE ORAL at 08:15

## 2024-09-13 RX ADMIN — HEPARIN SODIUM 1300 UNITS/HR: 10000 INJECTION, SOLUTION INTRAVENOUS at 01:38

## 2024-09-13 RX ADMIN — Medication 1 TABLET: at 08:16

## 2024-09-13 ASSESSMENT — ACTIVITIES OF DAILY LIVING (ADL)
ADLS_ACUITY_SCORE: 26

## 2024-09-13 NOTE — PROGRESS NOTES
[facility]  Cardiology II Service / Advanced Heart Failure  Daily Progress Note    Patient: Duane C Johnson      : 1950      MRN: [id number]    Assessment/Plan:   Duane C Johnson is a 74 year old male pmhx of anterior STEMI s/p PCI to the pLAD on 10/26/23 with a VT/VF arrest the next day (10/27) with patent stents and unchanged anatomy on repeat angiogram. Placed on amiodarone and discharged 23, ICD was not indicated as this was within 48h of his MI. His EF prior to discharge was 20-30% with a large area of akinesis in the LAD, placed on apixaban due to this (Apixaban dcd on 24). Has had multiple admissions for HF exacerbation last year.  Admitted on 24. He presents for 2 weeks of worsening fatigue; found to have BNP of 16k and L pleural effusion. Admitted for diuresis and RHC. CPX and RHC showed significant functional limitations due to heart failue. Plan for DT VAD while inpatient.     Today's changes:  - Slept a little better last night  - Wife planning to bring air mattress today. If this does not help over the weekend plan to consult pain management.   - Discrepancy between calorie counts and pt report, he reports drinking 5 supplemental drinks yesterday. Discussed keeping track of what he is eating/drinking.   - Will need to hold captopril 48 hrs before LVAD surgery. Surgery planned for .     # Acute heart failure exacerbation  #Possible ambulatory shock  # HFrEf 2/2 ICM (EF 15-20% by TTE 24)  Fluid status: Euvolemic  ACEi/ARB/ARNi/afterload reduction: Captopril 6.25 mg TID, hold for SBP < 90  BB: Hold metoprolol due to reduced CO and borderline BP  Aldosterone antagonist: unable to start d/t hypotension   SGLT2i: Empagliflozin 10mg (held on  for possible surgeries)  SCD prophylaxis: s/p ICD 2024  NSAID use: contraindicated  Antiarrhythmic: Amiodarone 200mg   Diuretic: 20mg Lasix Daily    Echo from  with EF 15-20% and trace  . See below for full report.    RHC from 9/3 showing normal R sided pressures, mildly increased L sided pressures, reduced CO. See below for full report.  BP as low as 84/62 (MAP 69), has remained asymptomatic other than fatigue and mild SOB.    - BNP of 16k on admission with L sided pleural effusion   - Continuing to hold Metoprolol   - Reduce to PO Lasix 20 mg daily for maintenance   - Low intensity heparin gtt.    - Restarted after dental extractions    - Continue Captopril 6.25 TID    - Continue Digoxin 125mcg    - cMRI as below for LVAD eval    #AFib  # h/o VT/VF arrest in 2023  # ICD placed on 5/8/24  Was on apixaban for low EF (akinesis of the mid-distal anterior,mid anteroseptum, distal septum and the apex) and questionable history of Afib. Since there was no episode of Afib captured apixaban was stopped on 7/5/24. Device interrogation showing AF episode started on 8/28/24. Converted to A-paced rhythm after cardioversion on 9/5. Back in AF on 9/7.   - PTA Amio 200, Magnesium 400, Potassium 20  - Holding PTA Metoprolol   - Mag and Potassium replacement per nursing protocol  - Had successful cardioversion on 9/5   > Back in Afib on 9/7. Maintaining regular rates and asymptomatic    #LVAD evaluation   Pt has received financial clearance and LVAD evaluation is proceeding.  - Dental evaluation shows carries in all remaining teeth. Has completed extraction.  - Urology consulted for history of prostate cancer. Has completed therapy and last PSA was undetectable. This is not an obstacle to LVAD  - Abd US without evidence of chronic liver disease.   - No significant carotid stenosis   - ABIs normal   - Inpatient neuropsych eval requested (not absolutely necessary)   - Optimizing nutritional status   - cMRI to definitively rule out thrombus given recent afib    # CAD s/p PCI to LAD  # STEMI  Anterior STEMI on 10/26/23 with a VT/VF arrest the next day. Repeat angio showed patent stents and unchanged anatomy.   - Stop  clopidogrel (finished course)  - Start aspirin 81mg  - Continue PTA Atorvastatin 80    #DOMENICA - Resolved  Baseline Cr normal at ~1. Cr of 1.55 on  with this acute episode of HF. Concern for type 1 cardiorenal syndrome. uPCR negative.   - Diuresis as above  - avoid nephrotoxins    # Moderate malnutrition  - Nutrition consulted to help manage  - Encourage high protein drinks     ================================  Chronic Problems:    #Prostate Cancer  Diagnosed in 2023. Underwent Leuprolide on 3/19/24 and 24. Completed radiation treatment on 24   - Consider testosterone level as outpatient to assess for recovery after ADT    #L inguinal hernia  Pt reports this has been present for some time. Not causing any pain. Not a surgical candidate with current HF status so will not consult surgery at this time.     Hospital Checklist:  DVT Prophylaxis: Heparin gtt  Code Status: Full Code  Bowel regimen: PRN  Diet/Nutrition: 2L fluid and 2G sodium restriction.   Lines: Left PIV    Disposition Plannin+ days for LVAD evaluation    Staffed w/ Dr. Cruz.    Cristobal Fisher MD   PGY-1 Internal Medicine  Cards 2 Service  [facility]    2024  ==================================    Subjective:   NAEO. Still had difficulty sleeping overnight due to back pain. Tylenol, Robaxin, and Icy-Hot helps somewhat. Wife is planning to bring an air mattress tomorrow. Continuing to walk the halls everyday and tolerating this well. No SOB, chest pain, or dizziness at rest.     Objective:     Vitals:    24 0517 24 0435 24 0600   Weight: 63.4 kg (139 lb 11.2 oz) 63 kg (138 lb 14.4 oz) 65.3 kg (144 lb)     Vital Signs with Ranges  Temp:  [97.7  F (36.5  C)-98.5  F (36.9  C)] 98.2  F (36.8  C)  Pulse:  [70-80] 70  Resp:  [16-18] 18  BP: ()/(67-89) 114/79  SpO2:  [95 %-100 %] 96 %  I/O last 3 completed shifts:  In: 1080.55 [P.O.:855; I.V.:225.55]  Out: 1200 [Urine:1200]    GENERAL: Sitting in  chair, no acute distress  HEENT: Atraumatic, moist mucus membranes, PERRL  RESP: Unlabored breathing on room air, no wheezes  CARDIO: Irregular rhythm, regular rate, extremities warm and well perfused, no peripheral edema  ABD: Non-distended, non-tender, bowel sounds active  NEURO: AAOx3, cranial nerves grossly intact, no focal deficits      Medications   Current Facility-Administered Medications   Medication Dose Route Frequency Provider Last Rate Last Admin    heparin 25,000 units in 0.45% NaCl 250 mL ANTICOAGULANT infusion  0-5,000 Units/hr Intravenous Continuous Cesia Park MD   Paused at 09/13/24 0642     Current Facility-Administered Medications   Medication Dose Route Frequency Provider Last Rate Last Admin    amiodarone (PACERONE) tablet 200 mg  200 mg Oral Daily Cristobal Fisher MD   200 mg at 09/12/24 0811    aspirin EC tablet 81 mg  81 mg Oral Daily Vladimir Johnson MD   81 mg at 09/12/24 0811    atorvastatin (LIPITOR) tablet 80 mg  80 mg Oral Daily Cristobal iFsher MD   80 mg at 09/12/24 0811    captopril (CAPOTEN) half-tab 6.25 mg  6.25 mg Oral TID AC Cristobal Fisher MD   6.25 mg at 09/12/24 1558    chlorhexidine (PERIDEX) 0.12 % solution 15 mL  15 mL Swish & Spit BID Cristobal Fisher MD   15 mL at 09/12/24 2011    digoxin (LANOXIN) tablet 125 mcg  125 mcg Oral Daily Danial Fofana MD   125 mcg at 09/12/24 0810    escitalopram (LEXAPRO) tablet 10 mg  10 mg Oral Daily Cristobal Fisher MD   10 mg at 09/12/24 0809    furosemide (LASIX) tablet 20 mg  20 mg Oral Daily Vladimir Johnson MD   20 mg at 09/12/24 0810    gabapentin (NEURONTIN) capsule 100 mg  100 mg Oral At Bedtime Cristobal Fisher MD   100 mg at 09/12/24 2010    Lidocaine (LIDOCARE) 4 % Patch 1 patch  1 patch Transdermal Q24h Danial Fofana MD   1 patch at 09/12/24 2011    magnesium oxide (MAG-OX) tablet 400 mg  400 mg Oral Q4H Cesia Park MD        magnesium oxide (MAG-OX) tablet 400 mg  400 mg Oral Daily Cristobal Fisher MD   400 mg at 09/12/24  0812    multivitamin w/minerals (THERA-VIT-M) tablet 1 tablet  1 tablet Oral BID Cristobal Schmidt MD   1 tablet at 24    potassium chloride tray ER (KLOR-CON M10) CR tablet 20 mEq  20 mEq Oral Daily Cristobal Schmidt MD   20 mEq at 24 0809    sodium chloride (PF) 0.9% PF flush 10 mL  10 mL Intravenous Once Vladimir Johnson MD        vitamin C (ASCORBIC ACID) tablet 1,000 mg  1,000 mg Oral Daily Cristobal Schmidt MD   1,000 mg at 24 0811       Data   Recent Labs   Lab 24  0611 24  0537 24  0539   WBC 11.3* 9.1 9.2   HGB 10.2* 9.4* 9.8*   MCV 97 97 98    250 304   INR 1.17* 1.21* 1.15   * 132* 134*   POTASSIUM 5.0 4.8 4.6   CHLORIDE 97* 97* 99   CO2 25 27 27   BUN 45.9* 38.1* 43.9*   CR 0.86 0.79 0.81   ANIONGAP 9 8 8   PRANAV 8.6* 8.7* 8.8   * 114* 106*   ALBUMIN 3.1* 3.0* 3.1*   PROTTOTAL 6.8 6.5 6.9   BILITOTAL 0.3 0.3 0.3   ALKPHOS 108 97 109   ALT 40 38 40   AST 35 34 37     RHC 9/3/24    RA:   RV:39/9  PA:  PCWP:16/22/15    Pa Sat:48.2%  Goldy; CO/CI: 2.91/1.71  Thermodilution; CO/CI:3.53/2.08   Right sided filling pressures are normal.   Left sided filling pressures are mildly elevated. Mild elevated pulmonary hypertension.   Reduced cardiac output level.         Echocardiogram Complete   Result Value    LVEF  15-20% (severely reduced)    [facility]    [id number]  [id number]  [id number]  [id number]^ROXANA^CRISTOBAL     [facility],[place]  Echocardiography Laboratory  [address] [place] [id number]     Name: JOHNSON, DUANE C  MRN: [id number]  : 1950  Study Date: 2024 08:20 AM  Age: 74 yrs  Gender: Male  Patient Location: [facility]  Reason For Study: CHF  Ordering Physician: CRISTOBAL SCHMIDT  Performed By: Yessenia Cano RDCS     BSA: 1.7 m2  Height: 66 in  Weight: 145 lb  HR: 71  BP: 94/73 mmHg  ______________________________________________________________________________  Procedure  Complete Portable Echo  Adult. Contrast Optison. Optison (NDC #2109-0906-50)  given intravenously. Patient was given 6 ml mixture of 3 ml Optison and 6 ml  saline. 3 ml wasted.  ______________________________________________________________________________  Interpretation Summary  Left ventricular function is decreased. The ejection fraction is 15-20%  (severely reduced).  Moderate left ventricular dilation is present.  Apical wall akinesis is present.  Severe diffuse hypokinesis is present.  There is no thrombus seen in the left ventricle.  The right ventricle is normal size.  Global right ventricular function is normal.  Mild functional mitral insufficiency is present.  Mild to moderate tricuspid functional insufficiency is present.  Pulmonary hypertension is present.The right ventricular systolic pressure is  40mmHg above the right atrial pressure.  IVC diameter and respiratory changes fall into an intermediate range  suggesting an RA pressure of 8 mmHg.     This study was compared with the study from 2/19/2024 .MR, TR improved. LVEF  similar in both studies compared side to side. So overall no change in LVEF  ______________________________________________________________________________  Left Ventricle  Left ventricular wall thickness is normal. Moderate left ventricular dilation  is present. Left ventricular diastolic function is not assessable. Left  ventricular function is decreased. The ejection fraction is 15-20% (severely  reduced). Apical wall akinesis is present. Severe diffuse hypokinesis is  present. There is no thrombus seen in the left ventricle.     Right Ventricle  The right ventricle is normal size. Global right ventricular function is  normal. A pacemaker lead is noted in the right ventricle.     Atria  Moderate left atrial enlargement is present. Moderate right atrial enlargement  is present.     Mitral Valve  Mild mitral insufficiency is present.     Aortic Valve  Trileaflet aortic sclerosis without stenosis. On  Doppler interrogation, there  is no significant stenosis or regurgitation.     Tricuspid Valve  Mild to moderate tricuspid insufficiency is present. The right ventricular  systolic pressure is approximated at 39.7 mmHg plus the right atrial pressure.  Pulmonary hypertension is present. The right ventricular systolic pressure is  40mmHg above the right atrial pressure.     Pulmonic Valve  On Doppler interrogation, there is no significant stenosis or regurgitation.     Vessels  The aorta root is normal. IVC diameter and respiratory changes fall into an  intermediate range suggesting an RA pressure of 8 mmHg.     Pericardium  No pericardial effusion is present.     Compared to Previous Study  This study was compared with the study from 2024 . MR, TR improved. LVEF  similar in both studies compared side to side. So overall no change in LVEF.  ______________________________________________________________________________  MMode/2D Measurements & Calculations     IVSd: 0.89 cm  LVIDd: 6.0 cm  LVIDs: 5.5 cm  LVPWd: 0.79 cm  FS: 7.1 %  LV mass(C)d: 197.0 grams  LV mass(C)dI: 112.9 grams/m2  LVOT diam: 2.0 cm  LVOT area: 3.2 cm2  EF Biplane: 16.8 %  LA Volume (BP): 71.3 ml  LA Volume Index (BP): 41.0 ml/m2  RV Base: 4.6 cm  RWT: 0.27     TAPSE: 1.0 cm     Doppler Measurements & Calculations  MV E max ivan: 82.7 cm/sec  LV V1 max P.9 mmHg  LV V1 max: 84.2 cm/sec  LV V1 VTI: 13.8 cm  MR PISA: 4.2 cm2  MR ERO: 0.33 cm2  MR volume: 35.7 ml  SV(LVOT): 43.8 ml  SI(LVOT): 25.1 ml/m2  PA acc time: 0.08 sec  TR max ivan: 315.1 cm/sec  TR max P.7 mmHg  RV S Ivan: 10.7 cm/sec     ______________________________________________________________________________  Report approved by: Jeanine MEJIA 2024 11:05 AM            Examination: XR CHEST 2 VIEWS 2024 3:06 PM     Indication: Shortness of breath     Comparison: Radiograph 2024. CT 2023.     Findings:  PA and lateral views of the chest. Left chest wall  implantable cardiac  defibrillator with grossly intact leads/shock coil. Coronary stenting.  Trachea is midline. Stable mild cardiomegaly. Mild blunting of the  left costophrenic angle, likely representing small pleural effusion.  No pneumothorax. No focal consolidation or any definite abnormal  airspace opacities. No acute or suspicious osseous abnormality.  Unremarkable visualized abdomen.      Impression   Impression:   1. Stable mild cardiomegaly with implantable cardiac defibrillator and  coronary stenting.  2. Small left pleural effusion.     I have personally reviewed the examination and initial interpretation  and I agree with the findings.     PANCHITO EID MD         SYSTEM ID:  [id number]      12/6/2023 CMR  Clinical history: 73 year old with anterior STEMI, cardiac arrest in Oct 2023  Comparison CMR: none  1. The left ventricle is severely enlarged. There is wall thinning and akinesis of the mid-distal anterior,  mid anteroseptum, distal septum and the apex  The global systolic function is severely reduced. The LVEF is 28%.  2. The right ventricle is normal in cavity size. The global systolic function is normal. The RVEF is 52%.   3. The right atrium is normal and the left atrium is severely enlarged.  4. There is mild mitral regurgitation.   5. There is transmural late gadolinium enhancement in mid-distal anterior, mid anteroseptum, distal  septum and the apex consistent with a large infarction in the LAD territory.   6. There is no pericardial effusion.  7. There is no intracardiac thrombus.  8. There are bilateral pleural effusions.  CONCLUSIONS:   Ischemic cardiomyopathy with a large anteroapical infarction.   Severely reduced left ventricular function and normal right ventricular function, LVEF 28% and RVEF 52%.     Cardiac Catheterization:  10/26/23    1st Mrg lesion is 20% stenosed.    Prox LAD to Mid LAD lesion is 100% stenosed.    1st Diag-1 lesion is 80% stenosed.    1st Diag-2 lesion is 50%  stenosed.    Dist LAD lesion is 100% stenosed.    RPDA lesion is 70% stenosed.    Dist RCA lesion is 40% stenosed.     Anterior STEMI  Two vessel obstructive CAD (100% pLAD occlusion, 70% rPDA stenosis, 80% diagonal 1 stenosis)  PCI of pLAD to mLAD with BRENDA x 1 (Synergy 2.50x 28 mm) post dilated to 2.75 vessel  CVC placement in RIJ  LVEDP of 26 mmHg  Hemostasis of RRA with TR band      2/19/2024 Echo  Interpretation Summary     1. The left ventricle is moderately dilated. Left ventricular hypertrophy is  noted by two-dimensional echocardiography. Proximal septal thickening is  noted. Left ventricular systolic function is moderate to severely reduced. The  visual ejection fraction is 25-30%. Biplane LVEF is 25%. Diastolic Doppler  findings (E/E' ratio and/or other parameters) suggest left ventricular filling  pressures are increased. There is severe hypokinesis to akinesis of the mid  anterior/anterolateral/anteroseptal/inferoseptal walls and all apical segments  of the left ventricle. There is no thrombus seen in the left ventricle.  2. The right ventricle is normal size. The right ventricular systolic function  is normal.  3. The left atrium is moderately dilated. Right atrial size is normal. There  is no color Doppler evidence of an atrial shunt.  4. There is moderate to mod-severe (2-3+) mitral regurgitation.  5. There is mild to moderate (1-2+) tricuspid regurgitation.Right ventricular  systolic pressure is elevated, consistent with moderate pulmonary  hypertension.  6. No pericardial effusion.  7. In direct comparison to the previous study dated 10/30/2023, there has been  an interval increase in the degree of mitral regurgitation. The other findings  are similar.     RHC 5/6/24  RA: 10/9/6 mmHg  RV: 61/11 mmHg  PA: 63/24/38 mmHg  PCWP: Unable to obtain accurate measurement  CO/CI (Goldy): 3.89/2.3 L/min/m2    PA sat: 64%  MAP: 85 mmHg       Right sided filling pressures are normal.    Mild elevated pulmonary  hypertension.    Normal cardiac output level.        Device interrogated on 8/30/24  Device: [employer] D533 RESONATE HF ICD  Normal device function.  Mode: DDDR  bpm  AP: 33%  : 3%  Intrinsic rhythm: Afib w/ VS  bpm  Thoracic Impedance: Below reference line, suggests possible intrathoracic fluid accumulation.  Lead Trends Appear Stable.  Estimated battery longevity to RRT = 12.5 years.    Atrial Arrhythmia: AF episode began on 8/28/24  AF Gobler: 11%  Anticoagulant: Eliquis  Ventricular Arrhythmia: None  Setting Changes: Rate response turned ON in device, pacing mode changed to DDDR from DDD, fallback rate during AT/AF episodes 70 bpm.

## 2024-09-13 NOTE — PLAN OF CARE
[id number] - [id number]     Neuro: A&Ox4. Able to make needs known.  Cardiac: Afebrile, VSS. 75% V paced with OCC PVCs. Denies chest pain.    Respiratory: Sating >95% on RA. LS clear.  GI/: Voiding spontaneously. No BM this shift.  Diet/appetite: Mechanical soft diet, calorie count, 2L FR. Denies nausea.  Activity: Up with SBA.    Pain: Pt endorse chronic back pain. PRN tylenol, robaxin and heat therapy helped.   Skin: Scattered bruises.   Lines: PIV on left forearm - infusing hep gtt at 1300 units/hr.  Replacements: Pot, mag, hep replacement protocol.      Plan: Scheduled LVAD placement on 9/18. Continue with POC and update team with changes.

## 2024-09-13 NOTE — PROGRESS NOTES
Calorie Count  Intake recorded for: 9/12  Total Kcals: 450 Total Protein: 38g  Kcals from Hospital Food: 450  Protein: 38g  Kcals from Outside Food (average):0 Protein: 0g  # Meals Ordered from Kitchen: 3 meals   # Meals Recorded: 1 meal - 100% scrambled eggs, 2 Greek yogurts, whole milk  # Supplements Recorded: 0

## 2024-09-14 LAB
ALBUMIN SERPL BCG-MCNC: 3 G/DL (ref 3.5–5.2)
ALP SERPL-CCNC: 91 U/L (ref 40–150)
ALT SERPL W P-5'-P-CCNC: 42 U/L (ref 0–70)
ANION GAP SERPL CALCULATED.3IONS-SCNC: 7 MMOL/L (ref 7–15)
AST SERPL W P-5'-P-CCNC: 33 U/L (ref 0–45)
BASOPHILS # BLD AUTO: 0 10E3/UL (ref 0–0.2)
BASOPHILS NFR BLD AUTO: 0 %
BILIRUB SERPL-MCNC: 0.3 MG/DL
BUN SERPL-MCNC: 39.2 MG/DL (ref 8–23)
CALCIUM SERPL-MCNC: 8.2 MG/DL (ref 8.8–10.4)
CHLORIDE SERPL-SCNC: 96 MMOL/L (ref 98–107)
CREAT SERPL-MCNC: 0.85 MG/DL (ref 0.67–1.17)
EGFRCR SERPLBLD CKD-EPI 2021: >90 ML/MIN/1.73M2
EOSINOPHIL # BLD AUTO: 0.1 10E3/UL (ref 0–0.7)
EOSINOPHIL NFR BLD AUTO: 1 %
ERYTHROCYTE [DISTWIDTH] IN BLOOD BY AUTOMATED COUNT: 17.7 % (ref 10–15)
GLUCOSE SERPL-MCNC: 112 MG/DL (ref 70–99)
HCO3 SERPL-SCNC: 25 MMOL/L (ref 22–29)
HCT VFR BLD AUTO: 28.5 % (ref 40–53)
HGB BLD-MCNC: 9 G/DL (ref 13.3–17.7)
IMM GRANULOCYTES # BLD: 0.1 10E3/UL
IMM GRANULOCYTES NFR BLD: 1 %
INR PPP: 1.14 (ref 0.85–1.15)
LYMPHOCYTES # BLD AUTO: 0.5 10E3/UL (ref 0.8–5.3)
LYMPHOCYTES NFR BLD AUTO: 5 %
MAGNESIUM SERPL-MCNC: 2.1 MG/DL (ref 1.7–2.3)
MCH RBC QN AUTO: 31.1 PG (ref 26.5–33)
MCHC RBC AUTO-ENTMCNC: 31.6 G/DL (ref 31.5–36.5)
MCV RBC AUTO: 99 FL (ref 78–100)
MONOCYTES # BLD AUTO: 0.6 10E3/UL (ref 0–1.3)
MONOCYTES NFR BLD AUTO: 6 %
NEUTROPHILS # BLD AUTO: 8.4 10E3/UL (ref 1.6–8.3)
NEUTROPHILS NFR BLD AUTO: 87 %
NRBC # BLD AUTO: 0 10E3/UL
NRBC BLD AUTO-RTO: 0 /100
PLATELET # BLD AUTO: 258 10E3/UL (ref 150–450)
POTASSIUM SERPL-SCNC: 5 MMOL/L (ref 3.4–5.3)
PROT SERPL-MCNC: 6.3 G/DL (ref 6.4–8.3)
RBC # BLD AUTO: 2.89 10E6/UL (ref 4.4–5.9)
SODIUM SERPL-SCNC: 128 MMOL/L (ref 135–145)
UFH PPP CHRO-ACNC: 0.26 IU/ML
WBC # BLD AUTO: 9.6 10E3/UL (ref 4–11)

## 2024-09-14 PROCEDURE — 36415 COLL VENOUS BLD VENIPUNCTURE: CPT

## 2024-09-14 PROCEDURE — 80053 COMPREHEN METABOLIC PANEL: CPT

## 2024-09-14 PROCEDURE — 250N000013 HC RX MED GY IP 250 OP 250 PS 637

## 2024-09-14 PROCEDURE — 250N000011 HC RX IP 250 OP 636

## 2024-09-14 PROCEDURE — 83735 ASSAY OF MAGNESIUM: CPT

## 2024-09-14 PROCEDURE — 85025 COMPLETE CBC W/AUTO DIFF WBC: CPT

## 2024-09-14 PROCEDURE — 120N000003 HC R&B IMCU UMMC

## 2024-09-14 PROCEDURE — 85520 HEPARIN ASSAY: CPT | Performed by: INTERNAL MEDICINE

## 2024-09-14 PROCEDURE — 85610 PROTHROMBIN TIME: CPT

## 2024-09-14 PROCEDURE — 250N000013 HC RX MED GY IP 250 OP 250 PS 637: Performed by: STUDENT IN AN ORGANIZED HEALTH CARE EDUCATION/TRAINING PROGRAM

## 2024-09-14 PROCEDURE — 99233 SBSQ HOSP IP/OBS HIGH 50: CPT | Mod: GC | Performed by: INTERNAL MEDICINE

## 2024-09-14 RX ORDER — NALOXONE HYDROCHLORIDE 0.4 MG/ML
0.2 INJECTION, SOLUTION INTRAMUSCULAR; INTRAVENOUS; SUBCUTANEOUS
Status: DISCONTINUED | OUTPATIENT
Start: 2024-09-14 | End: 2024-09-18

## 2024-09-14 RX ORDER — NALOXONE HYDROCHLORIDE 0.4 MG/ML
0.4 INJECTION, SOLUTION INTRAMUSCULAR; INTRAVENOUS; SUBCUTANEOUS
Status: DISCONTINUED | OUTPATIENT
Start: 2024-09-14 | End: 2024-09-18

## 2024-09-14 RX ORDER — FUROSEMIDE 10 MG/ML
40 INJECTION INTRAMUSCULAR; INTRAVENOUS ONCE
Status: COMPLETED | OUTPATIENT
Start: 2024-09-14 | End: 2024-09-14

## 2024-09-14 RX ADMIN — ASPIRIN 81 MG: 81 TABLET ORAL at 09:11

## 2024-09-14 RX ADMIN — CHLORHEXIDINE GLUCONATE 0.12% ORAL RINSE 15 ML: 1.2 LIQUID ORAL at 20:27

## 2024-09-14 RX ADMIN — MAGNESIUM OXIDE TAB 400 MG (241.3 MG ELEMENTAL MG) 400 MG: 400 (241.3 MG) TAB at 09:10

## 2024-09-14 RX ADMIN — Medication 6.25 MG: at 16:25

## 2024-09-14 RX ADMIN — ATORVASTATIN CALCIUM 80 MG: 80 TABLET, FILM COATED ORAL at 09:10

## 2024-09-14 RX ADMIN — Medication 6.25 MG: at 09:07

## 2024-09-14 RX ADMIN — DIGOXIN 125 MCG: 125 TABLET ORAL at 09:11

## 2024-09-14 RX ADMIN — OXYCODONE HYDROCHLORIDE AND ACETAMINOPHEN 1000 MG: 500 TABLET ORAL at 09:10

## 2024-09-14 RX ADMIN — FUROSEMIDE 40 MG: 10 INJECTION, SOLUTION INTRAVENOUS at 11:22

## 2024-09-14 RX ADMIN — METHOCARBAMOL 750 MG: 750 TABLET ORAL at 22:50

## 2024-09-14 RX ADMIN — FUROSEMIDE 20 MG: 20 TABLET ORAL at 09:11

## 2024-09-14 RX ADMIN — ESCITALOPRAM OXALATE 10 MG: 10 TABLET ORAL at 09:10

## 2024-09-14 RX ADMIN — ACETAMINOPHEN 1000 MG: 500 TABLET ORAL at 05:21

## 2024-09-14 RX ADMIN — ACETAMINOPHEN 1000 MG: 500 TABLET ORAL at 21:14

## 2024-09-14 RX ADMIN — CHLORHEXIDINE GLUCONATE 0.12% ORAL RINSE 15 ML: 1.2 LIQUID ORAL at 09:10

## 2024-09-14 RX ADMIN — GABAPENTIN 100 MG: 100 CAPSULE ORAL at 20:27

## 2024-09-14 RX ADMIN — METHOCARBAMOL 750 MG: 750 TABLET ORAL at 09:11

## 2024-09-14 RX ADMIN — Medication 1 TABLET: at 09:11

## 2024-09-14 RX ADMIN — ACETAMINOPHEN 1000 MG: 500 TABLET ORAL at 14:15

## 2024-09-14 RX ADMIN — POTASSIUM CHLORIDE 10 MEQ: 750 TABLET, EXTENDED RELEASE ORAL at 09:10

## 2024-09-14 RX ADMIN — Medication 6.25 MG: at 11:22

## 2024-09-14 RX ADMIN — AMIODARONE HYDROCHLORIDE 200 MG: 200 TABLET ORAL at 09:11

## 2024-09-14 RX ADMIN — METHOCARBAMOL 750 MG: 750 TABLET ORAL at 14:16

## 2024-09-14 RX ADMIN — LIDOCAINE 1 PATCH: 4 PATCH TOPICAL at 20:28

## 2024-09-14 RX ADMIN — DICLOFENAC SODIUM 2 G: 10 GEL TOPICAL at 11:24

## 2024-09-14 RX ADMIN — HEPARIN SODIUM 1100 UNITS/HR: 10000 INJECTION, SOLUTION INTRAVENOUS at 21:39

## 2024-09-14 RX ADMIN — Medication 1 TABLET: at 20:27

## 2024-09-14 ASSESSMENT — ACTIVITIES OF DAILY LIVING (ADL)
ADLS_ACUITY_SCORE: 26

## 2024-09-14 NOTE — PROGRESS NOTES
"BP 93/65 (BP Location: Left arm)   Pulse 70   Temp 98.1  F (36.7  C) (Oral)   Resp 16   Ht 1.676 m (5' 5.98\")   Wt 66.5 kg (146 lb 8 oz)   SpO2 95%   BMI 23.66 kg/m      Neuro: A&Ox4.   Cardiac: Afebrile, VSS.   Respiratory: RA   GI/: Voiding spontaneously. Large BM this shift.   Diet/appetite: Tolerating diet. 2L FR. Denies nausea   Activity: Up SBA   Pain: robaxin and tylenol, pain is still not well controlled per pt.  Skin: No new deficits noted.  Lines:PIV hep gtt-therapeutic   Drains:None  No new complaints.Will continue to monitor and follow plan of care.    "

## 2024-09-14 NOTE — PLAN OF CARE
7p-7a     Neuro: pt alert and oriented x 4 in early shift. Appeared to make some illogical statements later in noc. Forgetfule.  Cardiac/Telemetry: Monitor with Vpacing with underlying afib.   Respiratory: Lungs CTA on R/A. No c/o SOA.  GI/: Voiding per urinal. Last BM on  9/11.  Endocrine: No BS.  Diet/Appetite: On 2gm NA mechanical soft diet, 2L FR, on a calorie count.  Skin: intact with scattered bruises.  LDA: LA PIV with Heparin at 1100u/hr. Therapeutic x 3.  Activity: Up in room and becker with standby. Turns self in bed.  Pain: C/o back pain treated with tylenol and muscle relaxers, capsacin. Paged cards 2 on call last night re:pt needing something stronger for back pain. No return call.     Plan:   Plan of Care Reviewed With: patient  Overall Patient Progress: no change  Outcome Evaluation: Pt continues with LVAD work up for placement scheduled on 9/18.

## 2024-09-14 NOTE — PROGRESS NOTES
Calorie Count  Intake recorded for: 9/13  Total Kcals: 1184 Total Protein: 57g  Kcals from Hospital Food: 1184   Protein: 57g  Kcals from Outside Food (average):0 Protein: 0g  # Meals Ordered from Kitchen: 3 meals ordered  # Meals Recorded: 2 meals recorded  Meal 1: 100% scrambled eggs, 2 greek yogurts. 8oz whole milk  Meal 2: 100% Penne w/ meat sauce, corn, lemon fruit ice, 75% ice cream, 50% broccoli  # Supplements Recorded: none recorded

## 2024-09-14 NOTE — PLAN OF CARE
7p-7a    Neuro: Alert and oriented x 4. TORO. Uses call light appropriately to make needs known. Forgetful at times with some illogical statements later in noc.  Cardiac/Telemetry: Monitor with Vpacing with underlying afib, intrinsic beats. Denies CP, palpitations.  Respiratory: Lungs CTA on R/A. No cough noted.   GI/: Voiding per urinal. Pt asked for constipation remedy. Offered prn senokot or miralax, decided to wait until morning. Last BM 9/11.  Endocrine: No BS.   Diet/Appetite: Pt on mech soft regular diet. 2L fluid restriction and carb ct.   Skin: intact, scattered bruising.   LDA: LA PIV with heparin at 1100u/hr. Therapeutic x 2. Next lab draw this am.   Activity: Pt up in room and becker during the day. Has remained in bed tonight. Repositions himself.  Pain: C/o back pain. Tylenol given and lidocaine patch. Pt stated pain was well relieved until later when he c/o back hurting. Asked for lidocaine patch which he already had on. Melatonin and robaxin given. Pt returned to sleep.  Labs:drawn     Plan:  LVAD on 9/18.     Goal Outcome Evaluation:      Plan of Care Reviewed With: patient  Overall Patient Progress: no change  Outcome Evaluation: Pt continues with LVAD work up for placement scheduled on 9/18.    Update:  at 0600 pt c/o back pain despite capsacin and ES Tylenol and repositioning. Text paged cards on call. No return call.

## 2024-09-14 NOTE — PROGRESS NOTES
[facility]  Cardiology II Service / Advanced Heart Failure  Daily Progress Note    Patient: Duane C Johnson      : 1950      MRN: [id number]    Assessment/Plan:   Duane C Johnson is a 74 year old male pmhx of anterior STEMI s/p PCI to the pLAD on 10/26/23 with a VT/VF arrest the next day (10/27) with patent stents and unchanged anatomy on repeat angiogram. Placed on amiodarone and discharged 23, ICD was not indicated as this was within 48h of his MI. His EF prior to discharge was 20-30% with a large area of akinesis in the LAD, placed on apixaban due to this (Apixaban dcd on 24). Has had multiple admissions for HF exacerbation last year.  Admitted on 24. He presents for 2 weeks of worsening fatigue; found to have BNP of 16k and L pleural effusion. Admitted for diuresis and RHC. CPX and RHC showed significant functional limitations due to heart failue. Plan for DT VAD while inpatient.     Today's changes:  - Pt continuing to have significant mid back pain which is interfering with his sleep. His wife brought an air mattress yesterday however was told that it was not allowed due to safety concerns. Starting 2.5mg of oxycodone at night to help with pain control and sleep.   - Continues to be discrepancy between calorie counts and pt reports. He reports drinking 5 ensures, none are charted.   - Appears hypervolemic on exam with JVD up to mid neck and fine crackles bilaterally. Gave 40mg IV lasix, goal net negative 0.5-1L. Plan for RHC with swan placement on monday  - Will need to hold captopril 48 hrs before LVAD surgery. Surgery planned for .     #Acute heart failure exacerbation  # HFrEf 2/2 ICM (EF 15-20% by TTE 24)  Fluid status: Hypervolemic  ACEi/ARB/ARNi/afterload reduction: Captopril 6.25 mg TID, hold for SBP < 90  BB: Hold metoprolol due to reduced CO and borderline BP  Aldosterone antagonist: unable to start d/t hypotension   SGLT2i:  Empagliflozin 10mg (held on 9/8 for possible surgeries)  SCD prophylaxis: s/p ICD 5/2024  NSAID use: contraindicated  Antiarrhythmic: Amiodarone 200mg   Diuretic: 20mg Lasix Daily   - Gave additional 40mg IV    Echo from 8/31 with EF 15-20% and trace MR. See below for full report.    RHC from 9/3 showing normal R sided pressures, mildly increased L sided pressures, reduced CO. See below for full report.  BP as low as 84/62 (MAP 69), has remained asymptomatic other than fatigue and mild SOB.    - BNP of 16k on admission with L sided pleural effusion   - Continuing to hold Metoprolol   - Reduce to PO Lasix 20 mg daily for maintenance   - Low intensity heparin gtt.    - Restarted after dental extractions    - Continue Captopril 6.25 TID    - Continue Digoxin 125mcg    - cMRI as below for LVAD eval    #AFib  # h/o VT/VF arrest in 2023  # ICD placed on 5/8/24  Was on apixaban for low EF (akinesis of the mid-distal anterior,mid anteroseptum, distal septum and the apex) and questionable history of Afib. Since there was no episode of Afib captured apixaban was stopped on 7/5/24. Device interrogation showing AF episode started on 8/28/24. Converted to A-paced rhythm after cardioversion on 9/5. Back in AF on 9/7.   - PTA Amio 200, Magnesium 400, Potassium 20  - Holding PTA Metoprolol   - Mag and Potassium replacement per nursing protocol  - Had successful cardioversion on 9/5. Was back in afib afterwards for some time however now back to Vpaced rhythm.     #LVAD evaluation   Pt has received financial clearance and LVAD evaluation is proceeding.  - Dental evaluation shows carries in all remaining teeth. Has completed extraction.  - Urology consulted for history of prostate cancer. Has completed therapy and last PSA was undetectable. This is not an obstacle to LVAD  - Abd US without evidence of chronic liver disease.   - No significant carotid stenosis   - ABIs normal   - Optimizing nutritional status   - cMRI with no  thrombus    # CAD s/p PCI to LAD  # STEMI  Anterior STEMI on 10/26/23 with a VT/VF arrest the next day. Repeat angio showed patent stents and unchanged anatomy.   - Stop clopidogrel (finished course)  - Start aspirin 81mg  - Continue PTA Atorvastatin 80    #DOMENICA - Resolved  Baseline Cr normal at ~1. Cr of 1.55 on  with this acute episode of HF. Concern for type 1 cardiorenal syndrome. uPCR negative.   - Diuresis as above  - avoid nephrotoxins    # Moderate malnutrition  There is a discrepancy between pt reported intake and charted calorie counts. He reports eating/drinking more than is being charted.   - Nutrition consulted to help manage  - Encourage high protein drinks     ================================  Chronic Problems:    #Prostate Cancer  Diagnosed in 2023. Underwent Leuprolide on 3/19/24 and 24. Completed radiation treatment on 24   - Consider testosterone level as outpatient to assess for recovery after ADT    #L inguinal hernia  Pt reports this has been present for some time. Not causing any pain. Not a surgical candidate with current HF status so will not consult surgery at this time.     Hospital Checklist:  DVT Prophylaxis: Heparin gtt  Code Status: Full Code  Bowel regimen: PRN  Diet/Nutrition: 2L fluid and 2G sodium restriction.   Lines: Left PIV    Disposition Plannin+ days for LVAD evaluation    Staffed w/ Dr. Cruz.    Cristobal Fisher MD   PGY-1 Internal Medicine  Cards 2 Service  [facility]    2024  ==================================    Subjective:   NAEO. Still had difficulty sleeping overnight due to back pain. Tylenol, Robaxin, and Icy-Hot helps somewhat but he feels that they are not enough to control his pain and allow him to get restful sleep. His wife brought the air mattress yesterday, unfortunately due to safety concerns he is unable to use it.     Objective:     Vitals:    24 0435 24 0600 24 0114   Weight: 63 kg (138 lb 14.4 oz)  65.3 kg (144 lb) 66.5 kg (146 lb 8 oz)     Vital Signs with Ranges  Temp:  [97.5  F (36.4  C)-98.4  F (36.9  C)] 97.8  F (36.6  C)  Pulse:  [69-78] 71  Resp:  [18-20] 18  BP: ()/(55-77) 97/75  SpO2:  [95 %-99 %] 98 %  I/O last 3 completed shifts:  In: 1482.57 [P.O.:1270; I.V.:212.57]  Out: 1175 [Urine:1175]    GENERAL: Sitting in chair, no acute distress  HEENT: Atraumatic, moist mucus membranes, PERRL  NECK: JVD up to mid neck.   RESP: Unlabored breathing on room air, no wheezes. Fine crackles in bases bilaterally.  CARDIO: Regular rate and rhythm. extremities warm and well perfused, no peripheral edema  ABD: Non-distended, non-tender, bowel sounds active  NEURO: AAOx3, cranial nerves grossly intact, no focal deficits      Medications   Current Facility-Administered Medications   Medication Dose Route Frequency Provider Last Rate Last Admin    heparin 25,000 units in 0.45% NaCl 250 mL ANTICOAGULANT infusion  0-5,000 Units/hr Intravenous Continuous Cesia Park MD 11 mL/hr at 09/13/24 2349 1,100 Units/hr at 09/13/24 2349     Current Facility-Administered Medications   Medication Dose Route Frequency Provider Last Rate Last Admin    acetaminophen (TYLENOL) tablet 1,000 mg  1,000 mg Oral Q8H Cristobal Fisher MD   1,000 mg at 09/14/24 0521    amiodarone (PACERONE) tablet 200 mg  200 mg Oral Daily Cristobal Fisher MD   200 mg at 09/13/24 0817    aspirin EC tablet 81 mg  81 mg Oral Daily Vladimir Johnson MD   81 mg at 09/13/24 0815    atorvastatin (LIPITOR) tablet 80 mg  80 mg Oral Daily Cristobal Fisher MD   80 mg at 09/13/24 0817    captopril (CAPOTEN) half-tab 6.25 mg  6.25 mg Oral TID AC Cristobal Fisher MD   6.25 mg at 09/13/24 1706    chlorhexidine (PERIDEX) 0.12 % solution 15 mL  15 mL Swish & Spit BID Cristobal Fisher MD   15 mL at 09/13/24 2000    digoxin (LANOXIN) tablet 125 mcg  125 mcg Oral Daily Danial Fofana MD   125 mcg at 09/13/24 0815    escitalopram (LEXAPRO) tablet 10 mg  10 mg Oral Daily Natalia  MD Cristobal   10 mg at 09/13/24 0817    furosemide (LASIX) tablet 20 mg  20 mg Oral Daily Vladimir Johnson MD   20 mg at 09/13/24 0816    gabapentin (NEURONTIN) capsule 100 mg  100 mg Oral At Bedtime Cristobal Fisher MD   100 mg at 09/13/24 1959    Lidocaine (LIDOCARE) 4 % Patch 1 patch  1 patch Transdermal Q24h Danial Fofana MD   1 patch at 09/13/24 1958    magnesium oxide (MAG-OX) tablet 400 mg  400 mg Oral Daily Cristobal Fisher MD   400 mg at 09/13/24 0819    multivitamin w/minerals (THERA-VIT-M) tablet 1 tablet  1 tablet Oral BID Cristobal Fsiher MD   1 tablet at 09/13/24 2000    potassium chloride tray ER (KLOR-CON M10) CR tablet 10 mEq  10 mEq Oral Daily Cristobal Fisher MD        sodium chloride (PF) 0.9% PF flush 10 mL  10 mL Intravenous Once Vladimir Johnson MD        vitamin C (ASCORBIC ACID) tablet 1,000 mg  1,000 mg Oral Daily Cristobal Fisher MD   1,000 mg at 09/13/24 0816       Data   Recent Labs   Lab 09/14/24  0544 09/13/24  0611 09/12/24  0537   WBC 9.6 11.3* 9.1   HGB 9.0* 10.2* 9.4*   MCV 99 97 97    276 250   INR 1.14 1.17* 1.21*   * 131* 132*   POTASSIUM 5.0 5.0 4.8   CHLORIDE 96* 97* 97*   CO2 25 25 27   BUN 39.2* 45.9* 38.1*   CR 0.85 0.86 0.79   ANIONGAP 7 9 8   PRANAV 8.2* 8.6* 8.7*   * 114* 114*   ALBUMIN 3.0* 3.1* 3.0*   PROTTOTAL 6.3* 6.8 6.5   BILITOTAL 0.3 0.3 0.3   ALKPHOS 91 108 97   ALT 42 40 38   AST 33 35 34     RHC 9/3/24    RA: 11/11/9  RV:39/9  PA:39/16/26  PCWP:16/22/15    Pa Sat:48.2%  Goldy; CO/CI: 2.91/1.71  Thermodilution; CO/CI:3.53/2.08   Right sided filling pressures are normal.   Left sided filling pressures are mildly elevated. Mild elevated pulmonary hypertension.   Reduced cardiac output level.         Echocardiogram Complete   Result Value    LVEF  15-20% (severely reduced)    [facility]    [id number]  [id number]  [id number]  [id number]^ROXANA^CRISTOBAL     [facility],[place]  Echocardiography Laboratory  [address] [place] [address],  [place] [id number]     Name: JOHNSON, DUANE C  MRN: [id number]  : 1950  Study Date: 2024 08:20 AM  Age: 74 yrs  Gender: Male  Patient Location: [facility]  Reason For Study: CHF  Ordering Physician: SONIA SCHMIDT  Performed By: Yessenia Cano MEGHANA     BSA: 1.7 m2  Height: 66 in  Weight: 145 lb  HR: 71  BP: 94/73 mmHg  ______________________________________________________________________________  Procedure  Complete Portable Echo Adult. Contrast Optison. Optison (NDC #7990-5595-70)  given intravenously. Patient was given 6 ml mixture of 3 ml Optison and 6 ml  saline. 3 ml wasted.  ______________________________________________________________________________  Interpretation Summary  Left ventricular function is decreased. The ejection fraction is 15-20%  (severely reduced).  Moderate left ventricular dilation is present.  Apical wall akinesis is present.  Severe diffuse hypokinesis is present.  There is no thrombus seen in the left ventricle.  The right ventricle is normal size.  Global right ventricular function is normal.  Mild functional mitral insufficiency is present.  Mild to moderate tricuspid functional insufficiency is present.  Pulmonary hypertension is present.The right ventricular systolic pressure is  40mmHg above the right atrial pressure.  IVC diameter and respiratory changes fall into an intermediate range  suggesting an RA pressure of 8 mmHg.     This study was compared with the study from 2024 .MR, TR improved. LVEF  similar in both studies compared side to side. So overall no change in LVEF  ______________________________________________________________________________  Left Ventricle  Left ventricular wall thickness is normal. Moderate left ventricular dilation  is present. Left ventricular diastolic function is not assessable. Left  ventricular function is decreased. The ejection fraction is 15-20% (severely  reduced). Apical wall akinesis is present. Severe diffuse hypokinesis  is  present. There is no thrombus seen in the left ventricle.     Right Ventricle  The right ventricle is normal size. Global right ventricular function is  normal. A pacemaker lead is noted in the right ventricle.     Atria  Moderate left atrial enlargement is present. Moderate right atrial enlargement  is present.     Mitral Valve  Mild mitral insufficiency is present.     Aortic Valve  Trileaflet aortic sclerosis without stenosis. On Doppler interrogation, there  is no significant stenosis or regurgitation.     Tricuspid Valve  Mild to moderate tricuspid insufficiency is present. The right ventricular  systolic pressure is approximated at 39.7 mmHg plus the right atrial pressure.  Pulmonary hypertension is present. The right ventricular systolic pressure is  40mmHg above the right atrial pressure.     Pulmonic Valve  On Doppler interrogation, there is no significant stenosis or regurgitation.     Vessels  The aorta root is normal. IVC diameter and respiratory changes fall into an  intermediate range suggesting an RA pressure of 8 mmHg.     Pericardium  No pericardial effusion is present.     Compared to Previous Study  This study was compared with the study from 2024 . MR, TR improved. LVEF  similar in both studies compared side to side. So overall no change in LVEF.  ______________________________________________________________________________  MMode/2D Measurements & Calculations     IVSd: 0.89 cm  LVIDd: 6.0 cm  LVIDs: 5.5 cm  LVPWd: 0.79 cm  FS: 7.1 %  LV mass(C)d: 197.0 grams  LV mass(C)dI: 112.9 grams/m2  LVOT diam: 2.0 cm  LVOT area: 3.2 cm2  EF Biplane: 16.8 %  LA Volume (BP): 71.3 ml  LA Volume Index (BP): 41.0 ml/m2  RV Base: 4.6 cm  RWT: 0.27     TAPSE: 1.0 cm     Doppler Measurements & Calculations  MV E max francisco j: 82.7 cm/sec  LV V1 max P.9 mmHg  LV V1 max: 84.2 cm/sec  LV V1 VTI: 13.8 cm  MR PISA: 4.2 cm2  MR ERO: 0.33 cm2  MR volume: 35.7 ml  SV(LVOT): 43.8 ml  SI(LVOT): 25.1 ml/m2  PA acc  time: 0.08 sec  TR max ivan: 315.1 cm/sec  TR max P.7 mmHg  RV S Ivan: 10.7 cm/sec     ______________________________________________________________________________  Report approved by: Jeanine MEJIA 2024 11:05 AM            Examination: XR CHEST 2 VIEWS 2024 3:06 PM     Indication: Shortness of breath     Comparison: Radiograph 2024. CT 2023.     Findings:  PA and lateral views of the chest. Left chest wall implantable cardiac  defibrillator with grossly intact leads/shock coil. Coronary stenting.  Trachea is midline. Stable mild cardiomegaly. Mild blunting of the  left costophrenic angle, likely representing small pleural effusion.  No pneumothorax. No focal consolidation or any definite abnormal  airspace opacities. No acute or suspicious osseous abnormality.  Unremarkable visualized abdomen.      Impression   Impression:   1. Stable mild cardiomegaly with implantable cardiac defibrillator and  coronary stenting.  2. Small left pleural effusion.     I have personally reviewed the examination and initial interpretation  and I agree with the findings.     PANCHITO EID MD         SYSTEM ID:  [id number]      2023 CMR  Clinical history: 73 year old with anterior STEMI, cardiac arrest in Oct 2023  Comparison CMR: none  1. The left ventricle is severely enlarged. There is wall thinning and akinesis of the mid-distal anterior,  mid anteroseptum, distal septum and the apex  The global systolic function is severely reduced. The LVEF is 28%.  2. The right ventricle is normal in cavity size. The global systolic function is normal. The RVEF is 52%.   3. The right atrium is normal and the left atrium is severely enlarged.  4. There is mild mitral regurgitation.   5. There is transmural late gadolinium enhancement in mid-distal anterior, mid anteroseptum, distal  septum and the apex consistent with a large infarction in the LAD territory.   6. There is no pericardial effusion.  7. There is  no intracardiac thrombus.  8. There are bilateral pleural effusions.  CONCLUSIONS:   Ischemic cardiomyopathy with a large anteroapical infarction.   Severely reduced left ventricular function and normal right ventricular function, LVEF 28% and RVEF 52%.     Cardiac Catheterization:  10/26/23    1st Mrg lesion is 20% stenosed.    Prox LAD to Mid LAD lesion is 100% stenosed.    1st Diag-1 lesion is 80% stenosed.    1st Diag-2 lesion is 50% stenosed.    Dist LAD lesion is 100% stenosed.    RPDA lesion is 70% stenosed.    Dist RCA lesion is 40% stenosed.     Anterior STEMI  Two vessel obstructive CAD (100% pLAD occlusion, 70% rPDA stenosis, 80% diagonal 1 stenosis)  PCI of pLAD to mLAD with BRENDA x 1 (Synergy 2.50x 28 mm) post dilated to 2.75 vessel  CVC placement in RIJ  LVEDP of 26 mmHg  Hemostasis of RRA with TR band      2/19/2024 Echo  Interpretation Summary     1. The left ventricle is moderately dilated. Left ventricular hypertrophy is  noted by two-dimensional echocardiography. Proximal septal thickening is  noted. Left ventricular systolic function is moderate to severely reduced. The  visual ejection fraction is 25-30%. Biplane LVEF is 25%. Diastolic Doppler  findings (E/E' ratio and/or other parameters) suggest left ventricular filling  pressures are increased. There is severe hypokinesis to akinesis of the mid  anterior/anterolateral/anteroseptal/inferoseptal walls and all apical segments  of the left ventricle. There is no thrombus seen in the left ventricle.  2. The right ventricle is normal size. The right ventricular systolic function  is normal.  3. The left atrium is moderately dilated. Right atrial size is normal. There  is no color Doppler evidence of an atrial shunt.  4. There is moderate to mod-severe (2-3+) mitral regurgitation.  5. There is mild to moderate (1-2+) tricuspid regurgitation.Right ventricular  systolic pressure is elevated, consistent with moderate pulmonary  hypertension.  6. No  pericardial effusion.  7. In direct comparison to the previous study dated 10/30/2023, there has been  an interval increase in the degree of mitral regurgitation. The other findings  are similar.     RHC 5/6/24  RA: 10/9/6 mmHg  RV: 61/11 mmHg  PA: 63/24/38 mmHg  PCWP: Unable to obtain accurate measurement  CO/CI (Goldy): 3.89/2.3 L/min/m2    PA sat: 64%  MAP: 85 mmHg       Right sided filling pressures are normal.    Mild elevated pulmonary hypertension.    Normal cardiac output level.        Device interrogated on 8/30/24  Device: [employer] D533 RESONATE HF ICD  Normal device function.  Mode: DDDR  bpm  AP: 33%  : 3%  Intrinsic rhythm: Afib w/ VS  bpm  Thoracic Impedance: Below reference line, suggests possible intrathoracic fluid accumulation.  Lead Trends Appear Stable.  Estimated battery longevity to RRT = 12.5 years.    Atrial Arrhythmia: AF episode began on 8/28/24  AF [place]: 11%  Anticoagulant: Eliquis  Ventricular Arrhythmia: None  Setting Changes: Rate response turned ON in device, pacing mode changed to DDDR from DDD, fallback rate during AT/AF episodes 70 bpm.

## 2024-09-15 LAB
ALBUMIN SERPL BCG-MCNC: 2.9 G/DL (ref 3.5–5.2)
ALP SERPL-CCNC: 94 U/L (ref 40–150)
ALT SERPL W P-5'-P-CCNC: 40 U/L (ref 0–70)
ANION GAP SERPL CALCULATED.3IONS-SCNC: 7 MMOL/L (ref 7–15)
ANION GAP SERPL CALCULATED.3IONS-SCNC: 7 MMOL/L (ref 7–15)
ANION GAP SERPL CALCULATED.3IONS-SCNC: 8 MMOL/L (ref 7–15)
AST SERPL W P-5'-P-CCNC: 34 U/L (ref 0–45)
BASOPHILS # BLD AUTO: 0 10E3/UL (ref 0–0.2)
BASOPHILS NFR BLD AUTO: 0 %
BILIRUB SERPL-MCNC: 0.4 MG/DL
BUN SERPL-MCNC: 40.3 MG/DL (ref 8–23)
BUN SERPL-MCNC: 40.6 MG/DL (ref 8–23)
BUN SERPL-MCNC: 47.8 MG/DL (ref 8–23)
CALCIUM SERPL-MCNC: 8.3 MG/DL (ref 8.8–10.4)
CALCIUM SERPL-MCNC: 8.4 MG/DL (ref 8.8–10.4)
CALCIUM SERPL-MCNC: 8.5 MG/DL (ref 8.8–10.4)
CHLORIDE SERPL-SCNC: 89 MMOL/L (ref 98–107)
CHLORIDE SERPL-SCNC: 91 MMOL/L (ref 98–107)
CHLORIDE SERPL-SCNC: 92 MMOL/L (ref 98–107)
CREAT SERPL-MCNC: 0.88 MG/DL (ref 0.67–1.17)
CREAT SERPL-MCNC: 0.9 MG/DL (ref 0.67–1.17)
CREAT SERPL-MCNC: 0.95 MG/DL (ref 0.67–1.17)
EGFRCR SERPLBLD CKD-EPI 2021: 84 ML/MIN/1.73M2
EGFRCR SERPLBLD CKD-EPI 2021: 90 ML/MIN/1.73M2
EGFRCR SERPLBLD CKD-EPI 2021: 90 ML/MIN/1.73M2
EOSINOPHIL # BLD AUTO: 0.1 10E3/UL (ref 0–0.7)
EOSINOPHIL NFR BLD AUTO: 1 %
ERYTHROCYTE [DISTWIDTH] IN BLOOD BY AUTOMATED COUNT: 17.7 % (ref 10–15)
GLUCOSE SERPL-MCNC: 102 MG/DL (ref 70–99)
GLUCOSE SERPL-MCNC: 105 MG/DL (ref 70–99)
GLUCOSE SERPL-MCNC: 91 MG/DL (ref 70–99)
HCO3 SERPL-SCNC: 26 MMOL/L (ref 22–29)
HCO3 SERPL-SCNC: 27 MMOL/L (ref 22–29)
HCO3 SERPL-SCNC: 29 MMOL/L (ref 22–29)
HCT VFR BLD AUTO: 28.9 % (ref 40–53)
HGB BLD-MCNC: 9.4 G/DL (ref 13.3–17.7)
IMM GRANULOCYTES # BLD: 0.1 10E3/UL
IMM GRANULOCYTES NFR BLD: 1 %
INR PPP: 1.13 (ref 0.85–1.15)
LYMPHOCYTES # BLD AUTO: 0.5 10E3/UL (ref 0.8–5.3)
LYMPHOCYTES NFR BLD AUTO: 6 %
MAGNESIUM SERPL-MCNC: 2 MG/DL (ref 1.7–2.3)
MCH RBC QN AUTO: 31.5 PG (ref 26.5–33)
MCHC RBC AUTO-ENTMCNC: 32.5 G/DL (ref 31.5–36.5)
MCV RBC AUTO: 97 FL (ref 78–100)
MONOCYTES # BLD AUTO: 0.6 10E3/UL (ref 0–1.3)
MONOCYTES NFR BLD AUTO: 7 %
NEUTROPHILS # BLD AUTO: 7.6 10E3/UL (ref 1.6–8.3)
NEUTROPHILS NFR BLD AUTO: 85 %
NRBC # BLD AUTO: 0 10E3/UL
NRBC BLD AUTO-RTO: 0 /100
PLATELET # BLD AUTO: 252 10E3/UL (ref 150–450)
POTASSIUM SERPL-SCNC: 4.6 MMOL/L (ref 3.4–5.3)
POTASSIUM SERPL-SCNC: 4.8 MMOL/L (ref 3.4–5.3)
POTASSIUM SERPL-SCNC: 4.9 MMOL/L (ref 3.4–5.3)
PROT SERPL-MCNC: 6.4 G/DL (ref 6.4–8.3)
RBC # BLD AUTO: 2.98 10E6/UL (ref 4.4–5.9)
SODIUM SERPL-SCNC: 125 MMOL/L (ref 135–145)
SODIUM SERPL-SCNC: 125 MMOL/L (ref 135–145)
SODIUM SERPL-SCNC: 126 MMOL/L (ref 135–145)
UFH PPP CHRO-ACNC: 0.24 IU/ML
UFH PPP CHRO-ACNC: 0.33 IU/ML
UFH PPP CHRO-ACNC: 0.35 IU/ML
WBC # BLD AUTO: 8.8 10E3/UL (ref 4–11)

## 2024-09-15 PROCEDURE — 80048 BASIC METABOLIC PNL TOTAL CA: CPT

## 2024-09-15 PROCEDURE — 250N000013 HC RX MED GY IP 250 OP 250 PS 637: Performed by: STUDENT IN AN ORGANIZED HEALTH CARE EDUCATION/TRAINING PROGRAM

## 2024-09-15 PROCEDURE — 250N000011 HC RX IP 250 OP 636

## 2024-09-15 PROCEDURE — 250N000013 HC RX MED GY IP 250 OP 250 PS 637

## 2024-09-15 PROCEDURE — 250N000013 HC RX MED GY IP 250 OP 250 PS 637: Performed by: INTERNAL MEDICINE

## 2024-09-15 PROCEDURE — 85610 PROTHROMBIN TIME: CPT

## 2024-09-15 PROCEDURE — 36415 COLL VENOUS BLD VENIPUNCTURE: CPT

## 2024-09-15 PROCEDURE — 85004 AUTOMATED DIFF WBC COUNT: CPT

## 2024-09-15 PROCEDURE — 82040 ASSAY OF SERUM ALBUMIN: CPT

## 2024-09-15 PROCEDURE — 83735 ASSAY OF MAGNESIUM: CPT

## 2024-09-15 PROCEDURE — 99233 SBSQ HOSP IP/OBS HIGH 50: CPT | Mod: GC | Performed by: INTERNAL MEDICINE

## 2024-09-15 PROCEDURE — 85520 HEPARIN ASSAY: CPT | Performed by: INTERNAL MEDICINE

## 2024-09-15 PROCEDURE — 120N000003 HC R&B IMCU UMMC

## 2024-09-15 RX ORDER — OXYCODONE HYDROCHLORIDE 5 MG/1
5 TABLET ORAL
Status: DISCONTINUED | OUTPATIENT
Start: 2024-09-15 | End: 2024-09-18

## 2024-09-15 RX ORDER — HYDROXYZINE HYDROCHLORIDE 50 MG/1
50 TABLET, FILM COATED ORAL EVERY 6 HOURS PRN
Status: DISCONTINUED | OUTPATIENT
Start: 2024-09-15 | End: 2024-09-18

## 2024-09-15 RX ORDER — FUROSEMIDE 10 MG/ML
60 INJECTION INTRAMUSCULAR; INTRAVENOUS ONCE
Status: COMPLETED | OUTPATIENT
Start: 2024-09-15 | End: 2024-09-15

## 2024-09-15 RX ORDER — MAGNESIUM OXIDE 400 MG/1
400 TABLET ORAL EVERY 4 HOURS
Status: COMPLETED | OUTPATIENT
Start: 2024-09-15 | End: 2024-09-15

## 2024-09-15 RX ORDER — HYDROXYZINE HYDROCHLORIDE 25 MG/1
25 TABLET, FILM COATED ORAL EVERY 6 HOURS PRN
Status: DISCONTINUED | OUTPATIENT
Start: 2024-09-15 | End: 2024-09-18

## 2024-09-15 RX ADMIN — MAGNESIUM OXIDE TAB 400 MG (241.3 MG ELEMENTAL MG) 400 MG: 400 (241.3 MG) TAB at 14:00

## 2024-09-15 RX ADMIN — ATORVASTATIN CALCIUM 80 MG: 80 TABLET, FILM COATED ORAL at 08:28

## 2024-09-15 RX ADMIN — GABAPENTIN 100 MG: 100 CAPSULE ORAL at 19:32

## 2024-09-15 RX ADMIN — METHOCARBAMOL 750 MG: 750 TABLET ORAL at 08:27

## 2024-09-15 RX ADMIN — CHLORHEXIDINE GLUCONATE 0.12% ORAL RINSE 15 ML: 1.2 LIQUID ORAL at 08:28

## 2024-09-15 RX ADMIN — OXYCODONE HYDROCHLORIDE 2.5 MG: 5 TABLET ORAL at 00:31

## 2024-09-15 RX ADMIN — OXYCODONE HYDROCHLORIDE AND ACETAMINOPHEN 1000 MG: 500 TABLET ORAL at 08:27

## 2024-09-15 RX ADMIN — FUROSEMIDE 60 MG: 10 INJECTION, SOLUTION INTRAMUSCULAR; INTRAVENOUS at 10:51

## 2024-09-15 RX ADMIN — HEPARIN SODIUM 1250 UNITS/HR: 10000 INJECTION, SOLUTION INTRAVENOUS at 16:35

## 2024-09-15 RX ADMIN — Medication 6.25 MG: at 08:30

## 2024-09-15 RX ADMIN — MAGNESIUM OXIDE TAB 400 MG (241.3 MG ELEMENTAL MG) 400 MG: 400 (241.3 MG) TAB at 09:25

## 2024-09-15 RX ADMIN — MAGNESIUM OXIDE TAB 400 MG (241.3 MG ELEMENTAL MG) 400 MG: 400 (241.3 MG) TAB at 08:28

## 2024-09-15 RX ADMIN — ACETAMINOPHEN 1000 MG: 500 TABLET ORAL at 05:18

## 2024-09-15 RX ADMIN — POTASSIUM CHLORIDE 10 MEQ: 750 TABLET, EXTENDED RELEASE ORAL at 08:27

## 2024-09-15 RX ADMIN — Medication 1 TABLET: at 19:32

## 2024-09-15 RX ADMIN — CHLORHEXIDINE GLUCONATE 0.12% ORAL RINSE 15 ML: 1.2 LIQUID ORAL at 19:32

## 2024-09-15 RX ADMIN — AMIODARONE HYDROCHLORIDE 200 MG: 200 TABLET ORAL at 08:28

## 2024-09-15 RX ADMIN — Medication 6.25 MG: at 19:33

## 2024-09-15 RX ADMIN — Medication 5 MG: at 00:22

## 2024-09-15 RX ADMIN — DIGOXIN 125 MCG: 125 TABLET ORAL at 08:27

## 2024-09-15 RX ADMIN — ACETAMINOPHEN 1000 MG: 500 TABLET ORAL at 13:59

## 2024-09-15 RX ADMIN — ACETAMINOPHEN 1000 MG: 500 TABLET ORAL at 21:43

## 2024-09-15 RX ADMIN — LIDOCAINE 1 PATCH: 4 PATCH TOPICAL at 19:33

## 2024-09-15 RX ADMIN — ASPIRIN 81 MG: 81 TABLET ORAL at 08:28

## 2024-09-15 RX ADMIN — Medication 1 TABLET: at 08:28

## 2024-09-15 RX ADMIN — Medication 6.25 MG: at 13:59

## 2024-09-15 RX ADMIN — ESCITALOPRAM OXALATE 10 MG: 10 TABLET ORAL at 08:28

## 2024-09-15 ASSESSMENT — ACTIVITIES OF DAILY LIVING (ADL)
ADLS_ACUITY_SCORE: 26

## 2024-09-15 NOTE — PROGRESS NOTES
Calorie Count  Intake recorded for: 9/14  Total Kcals: 1352 Total Protein: 79g  Kcals from Hospital Food: 1372   Protein: 79g  Kcals from Outside Food (average):0 Protein: 0g  # Meals Ordered from Kitchen: 3 meals ordered  # Meals Recorded: 2 meals recorded  Meal 1: 100% scrambled eggs, 2 greek yogurts, 8oz whole milk  Meal 2: 50% Penne w/meat sauce, corn, broccoli, lemon fruit ice  # Supplements Recorded: 100% 1 Ensure Enlive

## 2024-09-15 NOTE — PLAN OF CARE
Neuro: A&Ox4. Cooperative  Cardiac: V paced, HR 70's. Denies CP. Afebrile.    Respiratory: RA, SOB with activity, DIOP. Satting >95%  GI/: Voiding spontaneously. No BM this shift.  Diet/appetite: Tolerating mech dental soft diet, 2L FR, fair appetite. Denies nausea.  Activity: Up ind/SBA. Ambulated the hallway x 1.   Pain: managed by current regimen. Pain continues to interfere with sleep, Provider aware and added Atarax PRN to regimen.   Skin: No new deficits noted.  Lines: L PIV- Heparin gtt @ 1100U/hr.   Replacements: K+ Mag protocol.  Plan: Possible RHC with [place] placement on Monday 9/16  LVAD surgery 9/18

## 2024-09-15 NOTE — PROGRESS NOTES
D:Patient has not been taking his diuretic for about a month before being admitted on 8/31. Not seeing documentation of this. Patient also has been drinking three supplements per day here in the hospital versus one that he would take at home  A:each supplement is 414cc besides in addition of the fluids he is ordering  I:contacted Cards II about patient not taking diuretics at home  P:[facility]

## 2024-09-15 NOTE — PROGRESS NOTES
.Major Shift Events:  Improving abd distension. Voiding excellent volumes. Tylenol, repo and heat effective for pain   Plan: Scheduled LVAD surgery   For vital signs and complete assessments, please see documentation flowsheets

## 2024-09-15 NOTE — PROVIDER NOTIFICATION
D:per patient and patients wife pt has not been taking diuretics for about a month before being admitted on 9/

## 2024-09-16 ENCOUNTER — ANESTHESIA EVENT (OUTPATIENT)
Dept: SURGERY | Facility: CLINIC | Age: 74
DRG: 001 | End: 2024-09-16
Payer: MEDICARE

## 2024-09-16 ENCOUNTER — APPOINTMENT (OUTPATIENT)
Dept: GENERAL RADIOLOGY | Facility: CLINIC | Age: 74
DRG: 001 | End: 2024-09-16
Attending: STUDENT IN AN ORGANIZED HEALTH CARE EDUCATION/TRAINING PROGRAM
Payer: MEDICARE

## 2024-09-16 LAB
ALBUMIN SERPL BCG-MCNC: 2.9 G/DL (ref 3.5–5.2)
ALP SERPL-CCNC: 91 U/L (ref 40–150)
ALT SERPL W P-5'-P-CCNC: 37 U/L (ref 0–70)
ANION GAP SERPL CALCULATED.3IONS-SCNC: 8 MMOL/L (ref 7–15)
ANION GAP SERPL CALCULATED.3IONS-SCNC: 8 MMOL/L (ref 7–15)
AST SERPL W P-5'-P-CCNC: 29 U/L (ref 0–45)
BASE EXCESS BLDV CALC-SCNC: 5.5 MMOL/L (ref -3–3)
BASE EXCESS BLDV CALC-SCNC: 5.5 MMOL/L (ref -3–3)
BASE EXCESS BLDV CALC-SCNC: 7.1 MMOL/L (ref -3–3)
BASOPHILS # BLD AUTO: 0 10E3/UL (ref 0–0.2)
BASOPHILS NFR BLD AUTO: 0 %
BILIRUB SERPL-MCNC: 0.4 MG/DL
BUN SERPL-MCNC: 33.2 MG/DL (ref 8–23)
BUN SERPL-MCNC: 41.9 MG/DL (ref 8–23)
CALCIUM SERPL-MCNC: 8.3 MG/DL (ref 8.8–10.4)
CALCIUM SERPL-MCNC: 8.6 MG/DL (ref 8.8–10.4)
CHLORIDE SERPL-SCNC: 90 MMOL/L (ref 98–107)
CHLORIDE SERPL-SCNC: 92 MMOL/L (ref 98–107)
CREAT SERPL-MCNC: 0.82 MG/DL (ref 0.67–1.17)
CREAT SERPL-MCNC: 0.87 MG/DL (ref 0.67–1.17)
CYSTATIN C (ROCHE): 1.6 MG/L (ref 0.6–1)
EGFRCR SERPLBLD CKD-EPI 2021: >90 ML/MIN/1.73M2
EGFRCR SERPLBLD CKD-EPI 2021: >90 ML/MIN/1.73M2
EOSINOPHIL # BLD AUTO: 0.1 10E3/UL (ref 0–0.7)
EOSINOPHIL NFR BLD AUTO: 1 %
ERYTHROCYTE [DISTWIDTH] IN BLOOD BY AUTOMATED COUNT: 17.6 % (ref 10–15)
GFR/BSA.PRED SERPLBLD CYS-BASED-ARV: 39 ML/MIN/1.73M2
GLUCOSE SERPL-MCNC: 90 MG/DL (ref 70–99)
GLUCOSE SERPL-MCNC: 93 MG/DL (ref 70–99)
HCO3 BLDV-SCNC: 30 MMOL/L (ref 21–28)
HCO3 BLDV-SCNC: 31 MMOL/L (ref 21–28)
HCO3 BLDV-SCNC: 32 MMOL/L (ref 21–28)
HCO3 SERPL-SCNC: 26 MMOL/L (ref 22–29)
HCO3 SERPL-SCNC: 26 MMOL/L (ref 22–29)
HCT VFR BLD AUTO: 29.1 % (ref 40–53)
HGB BLD-MCNC: 9.4 G/DL (ref 13.3–17.7)
IMM GRANULOCYTES # BLD: 0.1 10E3/UL
IMM GRANULOCYTES NFR BLD: 1 %
INR PPP: 1.24 (ref 0.85–1.15)
LYMPHOCYTES # BLD AUTO: 0.5 10E3/UL (ref 0.8–5.3)
LYMPHOCYTES NFR BLD AUTO: 7 %
MAGNESIUM SERPL-MCNC: 2.1 MG/DL (ref 1.7–2.3)
MCH RBC QN AUTO: 31.5 PG (ref 26.5–33)
MCHC RBC AUTO-ENTMCNC: 32.3 G/DL (ref 31.5–36.5)
MCV RBC AUTO: 98 FL (ref 78–100)
MONOCYTES # BLD AUTO: 0.4 10E3/UL (ref 0–1.3)
MONOCYTES NFR BLD AUTO: 5 %
NEUTROPHILS # BLD AUTO: 7 10E3/UL (ref 1.6–8.3)
NEUTROPHILS NFR BLD AUTO: 86 %
NRBC # BLD AUTO: 0 10E3/UL
NRBC BLD AUTO-RTO: 0 /100
O2/TOTAL GAS SETTING VFR VENT: 21 %
OXYHGB MFR BLDV: 50 % (ref 70–75)
OXYHGB MFR BLDV: 53 % (ref 70–75)
OXYHGB MFR BLDV: 61 % (ref 70–75)
PCO2 BLDV: 44 MM HG (ref 40–50)
PCO2 BLDV: 45 MM HG (ref 40–50)
PCO2 BLDV: 46 MM HG (ref 40–50)
PH BLDV: 7.43 [PH] (ref 7.32–7.43)
PH BLDV: 7.44 [PH] (ref 7.32–7.43)
PH BLDV: 7.45 [PH] (ref 7.32–7.43)
PLATELET # BLD AUTO: 280 10E3/UL (ref 150–450)
PO2 BLDV: 29 MM HG (ref 25–47)
PO2 BLDV: 31 MM HG (ref 25–47)
PO2 BLDV: 33 MM HG (ref 25–47)
POTASSIUM SERPL-SCNC: 4.5 MMOL/L (ref 3.4–5.3)
POTASSIUM SERPL-SCNC: 4.6 MMOL/L (ref 3.4–5.3)
PREALB SERPL-MCNC: 20 MG/DL (ref 20–40)
PROT SERPL-MCNC: 6.4 G/DL (ref 6.4–8.3)
RBC # BLD AUTO: 2.98 10E6/UL (ref 4.4–5.9)
SAO2 % BLDV: 50.9 % (ref 70–75)
SAO2 % BLDV: 54.8 % (ref 70–75)
SAO2 % BLDV: 63.1 % (ref 70–75)
SODIUM SERPL-SCNC: 124 MMOL/L (ref 135–145)
SODIUM SERPL-SCNC: 126 MMOL/L (ref 135–145)
UFH PPP CHRO-ACNC: 0.32 IU/ML
WBC # BLD AUTO: 8.1 10E3/UL (ref 4–11)

## 2024-09-16 PROCEDURE — 4A1239Z MONITORING OF CARDIAC OUTPUT, PERCUTANEOUS APPROACH: ICD-10-PCS | Performed by: INTERNAL MEDICINE

## 2024-09-16 PROCEDURE — 80053 COMPREHEN METABOLIC PANEL: CPT

## 2024-09-16 PROCEDURE — 83735 ASSAY OF MAGNESIUM: CPT | Performed by: INTERNAL MEDICINE

## 2024-09-16 PROCEDURE — 250N000013 HC RX MED GY IP 250 OP 250 PS 637

## 2024-09-16 PROCEDURE — 82805 BLOOD GASES W/O2 SATURATION: CPT | Performed by: STUDENT IN AN ORGANIZED HEALTH CARE EDUCATION/TRAINING PROGRAM

## 2024-09-16 PROCEDURE — 85610 PROTHROMBIN TIME: CPT

## 2024-09-16 PROCEDURE — 02HQ32Z INSERTION OF MONITORING DEVICE INTO RIGHT PULMONARY ARTERY, PERCUTANEOUS APPROACH: ICD-10-PCS | Performed by: INTERNAL MEDICINE

## 2024-09-16 PROCEDURE — 250N000011 HC RX IP 250 OP 636

## 2024-09-16 PROCEDURE — 36415 COLL VENOUS BLD VENIPUNCTURE: CPT | Performed by: INTERNAL MEDICINE

## 2024-09-16 PROCEDURE — 82610 CYSTATIN C: CPT

## 2024-09-16 PROCEDURE — 76000 FLUOROSCOPY <1 HR PHYS/QHP: CPT | Mod: TC

## 2024-09-16 PROCEDURE — 36415 COLL VENOUS BLD VENIPUNCTURE: CPT | Performed by: STUDENT IN AN ORGANIZED HEALTH CARE EDUCATION/TRAINING PROGRAM

## 2024-09-16 PROCEDURE — 85025 COMPLETE CBC W/AUTO DIFF WBC: CPT

## 2024-09-16 PROCEDURE — 85520 HEPARIN ASSAY: CPT | Performed by: INTERNAL MEDICINE

## 2024-09-16 PROCEDURE — 250N000011 HC RX IP 250 OP 636: Performed by: INTERNAL MEDICINE

## 2024-09-16 PROCEDURE — 84134 ASSAY OF PREALBUMIN: CPT

## 2024-09-16 PROCEDURE — 250N000011 HC RX IP 250 OP 636: Performed by: STUDENT IN AN ORGANIZED HEALTH CARE EDUCATION/TRAINING PROGRAM

## 2024-09-16 PROCEDURE — 200N000002 HC R&B ICU UMMC

## 2024-09-16 PROCEDURE — 99291 CRITICAL CARE FIRST HOUR: CPT | Mod: GC | Performed by: INTERNAL MEDICINE

## 2024-09-16 PROCEDURE — 250N000013 HC RX MED GY IP 250 OP 250 PS 637: Performed by: STUDENT IN AN ORGANIZED HEALTH CARE EDUCATION/TRAINING PROGRAM

## 2024-09-16 RX ORDER — FENTANYL CITRATE 50 UG/ML
25 INJECTION, SOLUTION INTRAMUSCULAR; INTRAVENOUS ONCE
Status: COMPLETED | OUTPATIENT
Start: 2024-09-16 | End: 2024-09-16

## 2024-09-16 RX ORDER — FUROSEMIDE 10 MG/ML
40 INJECTION INTRAMUSCULAR; INTRAVENOUS ONCE
Status: COMPLETED | OUTPATIENT
Start: 2024-09-16 | End: 2024-09-16

## 2024-09-16 RX ORDER — DOBUTAMINE HYDROCHLORIDE 200 MG/100ML
2.5 INJECTION INTRAVENOUS CONTINUOUS
Status: DISCONTINUED | OUTPATIENT
Start: 2024-09-16 | End: 2024-09-18

## 2024-09-16 RX ORDER — FENTANYL CITRATE 50 UG/ML
20 INJECTION, SOLUTION INTRAMUSCULAR; INTRAVENOUS ONCE
Status: DISCONTINUED | OUTPATIENT
Start: 2024-09-16 | End: 2024-09-16

## 2024-09-16 RX ORDER — FENTANYL CITRATE 50 UG/ML
INJECTION, SOLUTION INTRAMUSCULAR; INTRAVENOUS
Status: COMPLETED
Start: 2024-09-16 | End: 2024-09-16

## 2024-09-16 RX ADMIN — POTASSIUM CHLORIDE 10 MEQ: 750 TABLET, EXTENDED RELEASE ORAL at 08:38

## 2024-09-16 RX ADMIN — Medication 6.25 MG: at 08:42

## 2024-09-16 RX ADMIN — Medication 1 TABLET: at 20:08

## 2024-09-16 RX ADMIN — ACETAMINOPHEN 1000 MG: 500 TABLET ORAL at 05:53

## 2024-09-16 RX ADMIN — ESCITALOPRAM OXALATE 10 MG: 10 TABLET ORAL at 08:38

## 2024-09-16 RX ADMIN — DIGOXIN 125 MCG: 125 TABLET ORAL at 08:38

## 2024-09-16 RX ADMIN — LIDOCAINE 1 PATCH: 4 PATCH TOPICAL at 20:08

## 2024-09-16 RX ADMIN — FENTANYL CITRATE 25 MCG: 50 INJECTION, SOLUTION INTRAMUSCULAR; INTRAVENOUS at 16:24

## 2024-09-16 RX ADMIN — Medication 1 TABLET: at 08:38

## 2024-09-16 RX ADMIN — Medication 5 MG: at 01:16

## 2024-09-16 RX ADMIN — GABAPENTIN 100 MG: 100 CAPSULE ORAL at 20:08

## 2024-09-16 RX ADMIN — CHLORHEXIDINE GLUCONATE 0.12% ORAL RINSE 15 ML: 1.2 LIQUID ORAL at 08:39

## 2024-09-16 RX ADMIN — FUROSEMIDE 40 MG: 10 INJECTION, SOLUTION INTRAVENOUS at 18:54

## 2024-09-16 RX ADMIN — ATORVASTATIN CALCIUM 80 MG: 80 TABLET, FILM COATED ORAL at 08:38

## 2024-09-16 RX ADMIN — OXYCODONE HYDROCHLORIDE AND ACETAMINOPHEN 1000 MG: 500 TABLET ORAL at 08:39

## 2024-09-16 RX ADMIN — MAGNESIUM OXIDE TAB 400 MG (241.3 MG ELEMENTAL MG) 400 MG: 400 (241.3 MG) TAB at 08:39

## 2024-09-16 RX ADMIN — AMIODARONE HYDROCHLORIDE 200 MG: 200 TABLET ORAL at 08:38

## 2024-09-16 RX ADMIN — METHOCARBAMOL 750 MG: 750 TABLET ORAL at 08:38

## 2024-09-16 RX ADMIN — DOBUTAMINE HYDROCHLORIDE 2.5 MCG/KG/MIN: 200 INJECTION INTRAVENOUS at 17:54

## 2024-09-16 RX ADMIN — ACETAMINOPHEN 1000 MG: 500 TABLET ORAL at 22:08

## 2024-09-16 RX ADMIN — CHLORHEXIDINE GLUCONATE 0.12% ORAL RINSE 15 ML: 1.2 LIQUID ORAL at 20:08

## 2024-09-16 RX ADMIN — OXYCODONE HYDROCHLORIDE 5 MG: 5 TABLET ORAL at 01:16

## 2024-09-16 RX ADMIN — ASPIRIN 81 MG: 81 TABLET ORAL at 08:37

## 2024-09-16 RX ADMIN — HEPARIN SODIUM 1250 UNITS/HR: 10000 INJECTION, SOLUTION INTRAVENOUS at 12:37

## 2024-09-16 ASSESSMENT — ACTIVITIES OF DAILY LIVING (ADL)
ADLS_ACUITY_SCORE: 25
ADLS_ACUITY_SCORE: 26
ADLS_ACUITY_SCORE: 25
ADLS_ACUITY_SCORE: 26
ADLS_ACUITY_SCORE: 25
ADLS_ACUITY_SCORE: 26
ADLS_ACUITY_SCORE: 26
ADLS_ACUITY_SCORE: 25

## 2024-09-16 NOTE — PROGRESS NOTES
Admitted/transferred from:   Reason for admission/transfer: For further cardiac care and monitoring  2 RN skin assessment: completed by Fabi EPPS RN and Susan HARPER RN  Result of skin assessment and interventions/actions: WNL  Height, weight, drug calc weight: Done  Patient belongings (see Flowsheet)  MDRO education added to care plan: Yes  ?

## 2024-09-16 NOTE — PROGRESS NOTES
Pre-VAD Social Work Services Progress Note    Date of Initial Social Work Evaluation: 09/05/2024  Collaborated with: Patient    Data: Duane was evaluated for LVAD and is remaining inpatient until he receives his VAD implant. He continues to work with therapies and working towards increasing his nutrition prior to LVAD implant. Duane has his VAD implant tentatively scheduled for Wednesday. He has a Right Heart Cath procedure this afternoon. He was laying upright in his bed and indicated feeling content today.    Intervention: SW met with patient for supportive visit and to inquire about questions or concerns. Assessed coping and discussed how patient is keeping himself busy. Patient discussed taking walks throughout the day and trying to take a later walk to attempt to make him sleepy. He indicated it has been challenging to fall asleep and that his back is very achy, though stated he is trying to make the most of it. Duane discussed appropriate coping skills. SW reminded Pt about LVAD Support group on Tuesday.     Assessment: Duane was pleasant through discussion. He indicated wanting to be able to sleep more at night versus in the day, though discussed it is hard to break that cycle. He denied any questions or concerns at this time.  Education provided by SW: Ongoing SW availability and LVAD support group on Tuesdays.  Plan:    Discharge Plans in Progress: TBD    Barriers to d/c plan: VAD implant and medical condition    Follow up Plan: SW to continue to follow for any potential psychosocial concerns pre and post VAD implant.     Jessi Almendarez, MSW, LGSW, APSW  Heart Transplant/[facility]   Teams/Vocera  Ph. [phone]

## 2024-09-16 NOTE — PROCEDURES
Inpatient Procedure Note    Procedure: Ultrasound guided central access, PA catheter placement, central line placement  Indication: Hemodynamic monitoring, central access  Diagnosis: Cardiogenic shock    After informing the benefits and risks, an informed consent was obtained. A time out and site verification were done prior to initiation of the procedure. The neck was prepped and draped in the usual sterile fashion and infiltrated with lidocaine under ultrasound guidance. A 7.5 Fr sheath was placed in the right internal jugular vein using modified Seldinger technique ultrasound guidance. A balloon-tipped [place] catheter was advanced into the right atrium, right ventricle, pulmonary artery and pulmonary capillary wedge position with guidance of pressure waveform. The balloon was deflated and the [place] catheter was left in the pulmonary artery. RA/RV/PA/PCW pressures were obtained. [place] locked at 61 cm. At time of procedure completion, all the ports aspirated and flushed properly. The patient tolerated the procedure well without any immediate complications.     Post-procedure x-ray shows the tip of the catheter within the PA.    Complications: None   Blood loss: Minimal blood loss    BSA 1.73 m2  HR 70 bpm  BP 95/70 mmHg  RA 12/ PA 64/24/ PCW 20 mm Hg  Goldy CO 3.1, CI 1.8   PA sat 50% Hgb 9g/dl      Alonso Valle MD was present for key portions of the procedure.

## 2024-09-16 NOTE — PROGRESS NOTES
[facility]  Cardiology II Service / Advanced Heart Failure  Daily Progress Note    Patient: Duane C Johnson      : 1950      MRN: [id number]    Assessment/Plan:   Duane C Johnson is a 74 year old male pmhx of anterior STEMI s/p PCI to the pLAD on 10/26/23 with a VT/VF arrest the next day (10/27) with patent stents and unchanged anatomy on repeat angiogram. Placed on amiodarone and discharged 23, ICD was not indicated as this was within 48h of his MI. His EF prior to discharge was 20-30% with a large area of akinesis in the LAD, placed on apixaban due to this (Apixaban dcd on 24). Has had multiple admissions for HF exacerbation last year.  Admitted on 24. He presents for 2 weeks of worsening fatigue; found to have BNP of 16k and L pleural effusion. Admitted for diuresis and RHC. CPX and RHC showed significant functional limitations due to heart failue. Plan for DT VAD while inpatient.     Today's changes:  - Plan for LVAD on , CVTS assessing and will determine actual date  - RHC today with leave in swan and transfer to ICU  - Na continuing to drift, 124 today after getting 60 of IV Lasix yesterday. May give bolus of NS depending on RHC results.   - Holding captopril for possible LVAD on     #Acute heart failure exacerbation  # HFrEf 2/2 ICM (EF 15-20% by TTE 24)  Fluid status: Hypervolemic  ACEi/ARB/ARNi/afterload reduction: Captopril 6.25 mg TID, hold for SBP < 90  BB: Hold metoprolol due to reduced CO and borderline BP  Aldosterone antagonist: unable to start d/t hypotension   SGLT2i: Empagliflozin 10mg (held on  for possible surgeries)  SCD prophylaxis: s/p ICD 2024  NSAID use: contraindicated  Antiarrhythmic: Amiodarone 200mg   Diuretic: Holding 20mg Lasix Daily    Echo from  with EF 15-20% and trace MR. See below for full report.    RHC from 9/3 showing normal R sided pressures, mildly increased L sided pressures, reduced  CO. See below for full report.  BP as low as 84/62 (MAP 69), has remained asymptomatic other than fatigue and mild SOB.    - BNP of 16k on admission with L sided pleural effusion   - Continuing to hold Metoprolol   - Holding diuresis for Na of 125   - Low intensity heparin gtt.    - Restarted after dental extractions    - Continue Captopril 6.25 TID    - Continue Digoxin 125mcg    - cMRI without thrombus    #Hyponatremia  Na has drifted from 135 to 125 over the last week. Has occurred with increasing diuresis. Other than fatigue pt has no associated sx. No HA, dizziness, nausea.   - Q12hr checks  - Holding diuresis  - 1500mL fluid restriction  - pending RHC results may give 500mL NS bolus    #AFib  # h/o VT/VF arrest in 2023  # ICD placed on 5/8/24  Was on apixaban for low EF (akinesis of the mid-distal anterior,mid anteroseptum, distal septum and the apex) and questionable history of Afib. Since there was no episode of Afib captured apixaban was stopped on 7/5/24. Device interrogation showing AF episode started on 8/28/24. Converted to A-paced rhythm after cardioversion on 9/5. Recurrence of AF on 9/7; now vpaced.   - PTA Amio 200, Magnesium 400, Potassium 20  - Holding PTA Metoprolol   - Mag and Potassium replacement per nursing protocol  - Had successful cardioversion on 9/5. Was back in afib afterwards for some time however now back to Vpaced rhythm.     #LVAD evaluation   Pt has received financial clearance and LVAD evaluation is proceeding. Plan for LVAD on 9/18, CVTS is assessing and will determine actual date.   - Dental evaluation shows carries in all remaining teeth. Has completed extraction.  - Urology consulted for history of prostate cancer. Has completed therapy and last PSA was undetectable. This is not an obstacle to LVAD  - Abd US without evidence of chronic liver disease.   - No significant carotid stenosis   - ABIs normal   - Optimizing nutritional status   - cMRI with no thrombus    # CAD s/p PCI  to LAD  # STEMI  Anterior STEMI on 10/26/23 with a VT/VF arrest the next day. Repeat angio showed patent stents and unchanged anatomy.   - Stop clopidogrel (finished course)  - Start aspirin 81mg  - Continue PTA Atorvastatin 80    #DOMENICA - Resolved  Baseline Cr normal at ~1. Cr of 1.55 on  with this acute episode of HF. Concern for type 1 cardiorenal syndrome. uPCR negative.   - Diuresis as above  - avoid nephrotoxins    # Moderate malnutrition  There is a discrepancy between pt reported intake and charted calorie counts. He reports eating/drinking more than is being charted. Pre-albumin has increased throughout admission, from 7.3 to 20.   - Nutrition consulted to help manage  - Encourage high protein drinks     ================================  Chronic Problems:    #Prostate Cancer  Diagnosed in 2023. Underwent Leuprolide on 3/19/24 and 24. Completed radiation treatment on 24   - Consider testosterone level as outpatient to assess for recovery after ADT    #L inguinal hernia  Pt reports this has been present for some time. Not causing any pain. Not a surgical candidate with current HF status so will not consult surgery at this time.     Hospital Checklist:  DVT Prophylaxis: Heparin gtt  Code Status: Full Code  Bowel regimen: PRN Miralax and Senna  Diet/Nutrition: NPO for RHC today  Lines: Left PIV    Disposition Plannin+ days for LVAD evaluation    Staffed w/ Dr. Serge Fisher MD   PGY-1 Internal Medicine  Cards 2 Service  [facility]    2024  ==================================    Subjective:   NAEO. Reports getting good sleep last night, felt like the Oxycodone 5 helped quite a bit. Got up and walked the halls this morning and felt ok with that, still feeling fatigued but improved compared to last few days. Discussed plan for RHC today and pt has no questions. Denies chest pain, dizziness, SOB, headache.     Objective:     Vitals:    09/15/24 0500 24 0553  09/16/24 0605   Weight: 66 kg (145 lb 6.4 oz) 64.9 kg (143 lb 1.3 oz) 64.5 kg (142 lb 3.2 oz)     Vital Signs with Ranges  Temp:  [97.6  F (36.4  C)-98.1  F (36.7  C)] 97.8  F (36.6  C)  Pulse:  [70-76] 70  Resp:  [14-18] 16  BP: ()/(61-81) 104/62  SpO2:  [95 %-99 %] 99 %  I/O last 3 completed shifts:  In: 1678 [P.O.:1588; I.V.:90]  Out: 3150 [Urine:3150]    GENERAL: Sitting up in bed, no acute distress  HEENT: Atraumatic, moist mucus membranes, PERRL  NECK: No JVD.  RESP: Unlabored breathing on room air, no wheezes/rhonchi/crackles.   CARDIO: Regular rate and rhythm. extremities warm and well perfused, no peripheral edema  ABD: Non-distended, non-tender, bowel sounds active  NEURO: AAOx3, cranial nerves grossly intact, no focal deficits      Medications   Current Facility-Administered Medications   Medication Dose Route Frequency Provider Last Rate Last Admin    heparin 25,000 units in 0.45% NaCl 250 mL ANTICOAGULANT infusion  0-5,000 Units/hr Intravenous Continuous Cesia Park MD 12.5 mL/hr at 09/15/24 2330 1,250 Units/hr at 09/15/24 2330     Current Facility-Administered Medications   Medication Dose Route Frequency Provider Last Rate Last Admin    acetaminophen (TYLENOL) tablet 1,000 mg  1,000 mg Oral Q8H Cristobal Fisher MD   1,000 mg at 09/16/24 0553    amiodarone (PACERONE) tablet 200 mg  200 mg Oral Daily Cristobal Fisher MD   200 mg at 09/15/24 0828    aspirin EC tablet 81 mg  81 mg Oral Daily Vladimir Johnson MD   81 mg at 09/15/24 0828    atorvastatin (LIPITOR) tablet 80 mg  80 mg Oral Daily Cristobal Fisher MD   80 mg at 09/15/24 0828    captopril (CAPOTEN) half-tab 6.25 mg  6.25 mg Oral TID AC Cristobal Fisher MD   6.25 mg at 09/15/24 1933    chlorhexidine (PERIDEX) 0.12 % solution 15 mL  15 mL Swish & Spit BID Cristobal Fisher MD   15 mL at 09/15/24 1932    digoxin (LANOXIN) tablet 125 mcg  125 mcg Oral Daily Danial Fofana MD   125 mcg at 09/15/24 0827    escitalopram (LEXAPRO) tablet 10 mg  10 mg  Oral Daily Cristobal Fisher MD   10 mg at 09/15/24 0828    gabapentin (NEURONTIN) capsule 100 mg  100 mg Oral At Bedtime Cristobal Fisher MD   100 mg at 09/15/24 1932    Lidocaine (LIDOCARE) 4 % Patch 1 patch  1 patch Transdermal Q24h Danial Fofana MD   1 patch at 09/15/24 1933    magnesium oxide (MAG-OX) tablet 400 mg  400 mg Oral Daily Cristobal Fisher MD   400 mg at 09/15/24 0828    multivitamin w/minerals (THERA-VIT-M) tablet 1 tablet  1 tablet Oral BID Cristobal Fisher MD   1 tablet at 09/15/24 1932    potassium chloride tray ER (KLOR-CON M10) CR tablet 10 mEq  10 mEq Oral Daily Cristobal Fisher MD   10 mEq at 09/15/24 0827    sodium chloride (PF) 0.9% PF flush 10 mL  10 mL Intravenous Once Vladimir Johnson MD        vitamin C (ASCORBIC ACID) tablet 1,000 mg  1,000 mg Oral Daily Cristobal Fisher MD   1,000 mg at 09/15/24 0827       Data   Recent Labs   Lab 09/16/24  0626 09/15/24  2018 09/15/24  1201 09/15/24  0642 09/14/24  0544 09/13/24  0611   WBC 8.1  --   --  8.8 9.6 11.3*   HGB 9.4*  --   --  9.4* 9.0* 10.2*   MCV 98  --   --  97 99 97     --   --  252 258 276   INR  --   --   --  1.13 1.14 1.17*   * 126* 125* 125* 128* 131*   POTASSIUM 4.5 4.9 4.6 4.8 5.0 5.0   CHLORIDE 90* 89* 91* 92* 96* 97*   CO2 26 29 27 26 25 25   BUN 41.9* 47.8* 40.6* 40.3* 39.2* 45.9*   CR 0.87 0.95 0.88 0.90 0.85 0.86   ANIONGAP 8 8 7 7 7 9   PRANAV 8.3* 8.5* 8.3* 8.4* 8.2* 8.6*   GLC 93 105* 91 102* 112* 114*   ALBUMIN 2.9*  --   --  2.9* 3.0* 3.1*   PROTTOTAL 6.4  --   --  6.4 6.3* 6.8   BILITOTAL 0.4  --   --  0.4 0.3 0.3   ALKPHOS 91  --   --  94 91 108   ALT 37  --   --  40 42 40   AST 29  --   --  34 33 35     RHC 9/3/24    RA: 11/11/9  RV:39/9  PA:39/16/26  PCWP:16/22/15    Pa Sat:48.2%  Goldy; CO/CI: 2.91/1.71  Thermodilution; CO/CI:3.53/2.08   Right sided filling pressures are normal.   Left sided filling pressures are mildly elevated. Mild elevated pulmonary hypertension.   Reduced cardiac output level.          Echocardiogram Complete   Result Value    LVEF  15-20% (severely reduced)    [facility]    [id number]  [id number]  [id number]  [id number]^ROXANA^SONIA     [facility],[place]  Echocardiography Laboratory  [address] [place] [id number]     Name: JOHNSON, DUANE C  MRN: [id number]  : 1950  Study Date: 2024 08:20 AM  Age: 74 yrs  Gender: Male  Patient Location: [facility]  Reason For Study: CHF  Ordering Physician: SONIA SCHMIDT  Performed By: Yessenia Cano RDCS     BSA: 1.7 m2  Height: 66 in  Weight: 145 lb  HR: 71  BP: 94/73 mmHg  ______________________________________________________________________________  Procedure  Complete Portable Echo Adult. Contrast Optison. Optison (NDC #7396-5575-00)  given intravenously. Patient was given 6 ml mixture of 3 ml Optison and 6 ml  saline. 3 ml wasted.  ______________________________________________________________________________  Interpretation Summary  Left ventricular function is decreased. The ejection fraction is 15-20%  (severely reduced).  Moderate left ventricular dilation is present.  Apical wall akinesis is present.  Severe diffuse hypokinesis is present.  There is no thrombus seen in the left ventricle.  The right ventricle is normal size.  Global right ventricular function is normal.  Mild functional mitral insufficiency is present.  Mild to moderate tricuspid functional insufficiency is present.  Pulmonary hypertension is present.The right ventricular systolic pressure is  40mmHg above the right atrial pressure.  IVC diameter and respiratory changes fall into an intermediate range  suggesting an RA pressure of 8 mmHg.     This study was compared with the study from 2024 .MR, TR improved. LVEF  similar in both studies compared side to side. So overall no change in LVEF  ______________________________________________________________________________  Left Ventricle  Left ventricular wall thickness is normal.  Moderate left ventricular dilation  is present. Left ventricular diastolic function is not assessable. Left  ventricular function is decreased. The ejection fraction is 15-20% (severely  reduced). Apical wall akinesis is present. Severe diffuse hypokinesis is  present. There is no thrombus seen in the left ventricle.     Right Ventricle  The right ventricle is normal size. Global right ventricular function is  normal. A pacemaker lead is noted in the right ventricle.     Atria  Moderate left atrial enlargement is present. Moderate right atrial enlargement  is present.     Mitral Valve  Mild mitral insufficiency is present.     Aortic Valve  Trileaflet aortic sclerosis without stenosis. On Doppler interrogation, there  is no significant stenosis or regurgitation.     Tricuspid Valve  Mild to moderate tricuspid insufficiency is present. The right ventricular  systolic pressure is approximated at 39.7 mmHg plus the right atrial pressure.  Pulmonary hypertension is present. The right ventricular systolic pressure is  40mmHg above the right atrial pressure.     Pulmonic Valve  On Doppler interrogation, there is no significant stenosis or regurgitation.     Vessels  The aorta root is normal. IVC diameter and respiratory changes fall into an  intermediate range suggesting an RA pressure of 8 mmHg.     Pericardium  No pericardial effusion is present.     Compared to Previous Study  This study was compared with the study from 2/19/2024 . MR, TR improved. LVEF  similar in both studies compared side to side. So overall no change in LVEF.  ______________________________________________________________________________  MMode/2D Measurements & Calculations     IVSd: 0.89 cm  LVIDd: 6.0 cm  LVIDs: 5.5 cm  LVPWd: 0.79 cm  FS: 7.1 %  LV mass(C)d: 197.0 grams  LV mass(C)dI: 112.9 grams/m2  LVOT diam: 2.0 cm  LVOT area: 3.2 cm2  EF Biplane: 16.8 %  LA Volume (BP): 71.3 ml  LA Volume Index (BP): 41.0 ml/m2  RV Base: 4.6 cm  RWT: 0.27      TAPSE: 1.0 cm     Doppler Measurements & Calculations  MV E max ivan: 82.7 cm/sec  LV V1 max P.9 mmHg  LV V1 max: 84.2 cm/sec  LV V1 VTI: 13.8 cm  MR PISA: 4.2 cm2  MR ERO: 0.33 cm2  MR volume: 35.7 ml  SV(LVOT): 43.8 ml  SI(LVOT): 25.1 ml/m2  PA acc time: 0.08 sec  TR max ivan: 315.1 cm/sec  TR max P.7 mmHg  RV S Ivan: 10.7 cm/sec     ______________________________________________________________________________  Report approved by: Jeanine MEJIA 2024 11:05 AM            Examination: XR CHEST 2 VIEWS 2024 3:06 PM     Indication: Shortness of breath     Comparison: Radiograph 2024. CT 2023.     Findings:  PA and lateral views of the chest. Left chest wall implantable cardiac  defibrillator with grossly intact leads/shock coil. Coronary stenting.  Trachea is midline. Stable mild cardiomegaly. Mild blunting of the  left costophrenic angle, likely representing small pleural effusion.  No pneumothorax. No focal consolidation or any definite abnormal  airspace opacities. No acute or suspicious osseous abnormality.  Unremarkable visualized abdomen.      Impression   Impression:   1. Stable mild cardiomegaly with implantable cardiac defibrillator and  coronary stenting.  2. Small left pleural effusion.     I have personally reviewed the examination and initial interpretation  and I agree with the findings.     PANCHITO EID MD         SYSTEM ID:  [id number]      2023 CMR  Clinical history: 73 year old with anterior STEMI, cardiac arrest in Oct 2023  Comparison CMR: none  1. The left ventricle is severely enlarged. There is wall thinning and akinesis of the mid-distal anterior,  mid anteroseptum, distal septum and the apex  The global systolic function is severely reduced. The LVEF is 28%.  2. The right ventricle is normal in cavity size. The global systolic function is normal. The RVEF is 52%.   3. The right atrium is normal and the left atrium is severely enlarged.  4. There is  mild mitral regurgitation.   5. There is transmural late gadolinium enhancement in mid-distal anterior, mid anteroseptum, distal  septum and the apex consistent with a large infarction in the LAD territory.   6. There is no pericardial effusion.  7. There is no intracardiac thrombus.  8. There are bilateral pleural effusions.  CONCLUSIONS:   Ischemic cardiomyopathy with a large anteroapical infarction.   Severely reduced left ventricular function and normal right ventricular function, LVEF 28% and RVEF 52%.     Cardiac Catheterization:  10/26/23    1st Mrg lesion is 20% stenosed.    Prox LAD to Mid LAD lesion is 100% stenosed.    1st Diag-1 lesion is 80% stenosed.    1st Diag-2 lesion is 50% stenosed.    Dist LAD lesion is 100% stenosed.    RPDA lesion is 70% stenosed.    Dist RCA lesion is 40% stenosed.     Anterior STEMI  Two vessel obstructive CAD (100% pLAD occlusion, 70% rPDA stenosis, 80% diagonal 1 stenosis)  PCI of pLAD to mLAD with BRENDA x 1 (Synergy 2.50x 28 mm) post dilated to 2.75 vessel  CVC placement in RIJ  LVEDP of 26 mmHg  Hemostasis of RRA with TR band      2/19/2024 Echo  Interpretation Summary     1. The left ventricle is moderately dilated. Left ventricular hypertrophy is  noted by two-dimensional echocardiography. Proximal septal thickening is  noted. Left ventricular systolic function is moderate to severely reduced. The  visual ejection fraction is 25-30%. Biplane LVEF is 25%. Diastolic Doppler  findings (E/E' ratio and/or other parameters) suggest left ventricular filling  pressures are increased. There is severe hypokinesis to akinesis of the mid  anterior/anterolateral/anteroseptal/inferoseptal walls and all apical segments  of the left ventricle. There is no thrombus seen in the left ventricle.  2. The right ventricle is normal size. The right ventricular systolic function  is normal.  3. The left atrium is moderately dilated. Right atrial size is normal. There  is no color Doppler evidence  of an atrial shunt.  4. There is moderate to mod-severe (2-3+) mitral regurgitation.  5. There is mild to moderate (1-2+) tricuspid regurgitation.Right ventricular  systolic pressure is elevated, consistent with moderate pulmonary  hypertension.  6. No pericardial effusion.  7. In direct comparison to the previous study dated 10/30/2023, there has been  an interval increase in the degree of mitral regurgitation. The other findings  are similar.     RHC 5/6/24  RA: 10/9/6 mmHg  RV: 61/11 mmHg  PA: 63/24/38 mmHg  PCWP: Unable to obtain accurate measurement  CO/CI (Goldy): 3.89/2.3 L/min/m2    PA sat: 64%  MAP: 85 mmHg       Right sided filling pressures are normal.    Mild elevated pulmonary hypertension.    Normal cardiac output level.        Device interrogated on 8/30/24  Device: [employer] D533 RESONATE HF ICD  Normal device function.  Mode: DDDR  bpm  AP: 33%  : 3%  Intrinsic rhythm: Afib w/ VS  bpm  Thoracic Impedance: Below reference line, suggests possible intrathoracic fluid accumulation.  Lead Trends Appear Stable.  Estimated battery longevity to RRT = 12.5 years.    Atrial Arrhythmia: AF episode began on 8/28/24  AF [place]: 11%  Anticoagulant: Eliquis  Ventricular Arrhythmia: None  Setting Changes: Rate response turned ON in device, pacing mode changed to DDDR from DDD, fallback rate during AT/AF episodes 70 bpm.

## 2024-09-16 NOTE — PROGRESS NOTES
Calorie Count  Intake recorded for: 9/15  Total Kcals: 1709 Total Protein: 151g  Kcals from Hospital Food: 1109   Protein: 73g  Kcals from Outside Food (average):600 Protein: 78g  # Meals Ordered from Kitchen: 3  # Meals Recorded: 3  1: 100% scrambled eggs, 2 greek yogurt, 8 oz whole milk  2: 100% tomato soup  3: 100% potato soup   # Supplements Recorded: 4  1: 100% ensure enlive  2: 100% 3 fair life core power

## 2024-09-16 NOTE — PLAN OF CARE
Goal Outcome Evaluation:      Plan of Care Reviewed With: patient    Overall Patient Progress: no change    Hours of care: [id number]    No acute events overnight. VSS. Reported chronic back pain which was effectively managed with oxycodone x1. Slept from approximately 0596-2546. Room air. Denies SOB or CP. See flowsheets for detailed assessments.

## 2024-09-17 ENCOUNTER — APPOINTMENT (OUTPATIENT)
Dept: GENERAL RADIOLOGY | Facility: CLINIC | Age: 74
DRG: 001 | End: 2024-09-17
Attending: STUDENT IN AN ORGANIZED HEALTH CARE EDUCATION/TRAINING PROGRAM
Payer: MEDICARE

## 2024-09-17 LAB
ABO/RH(D): NORMAL
ALBUMIN SERPL BCG-MCNC: 3 G/DL (ref 3.5–5.2)
ALP SERPL-CCNC: 87 U/L (ref 40–150)
ALT SERPL W P-5'-P-CCNC: 34 U/L (ref 0–70)
ANION GAP SERPL CALCULATED.3IONS-SCNC: 11 MMOL/L (ref 7–15)
ANION GAP SERPL CALCULATED.3IONS-SCNC: 9 MMOL/L (ref 7–15)
ANTIBODY SCREEN: NEGATIVE
AST SERPL W P-5'-P-CCNC: 29 U/L (ref 0–45)
BASE EXCESS BLDV CALC-SCNC: 2.8 MMOL/L (ref -3–3)
BASE EXCESS BLDV CALC-SCNC: 5.7 MMOL/L (ref -3–3)
BASE EXCESS BLDV CALC-SCNC: 6.4 MMOL/L (ref -3–3)
BASE EXCESS BLDV CALC-SCNC: 7.5 MMOL/L (ref -3–3)
BASOPHILS # BLD AUTO: 0 10E3/UL (ref 0–0.2)
BASOPHILS NFR BLD AUTO: 0 %
BILIRUB SERPL-MCNC: 0.4 MG/DL
BUN SERPL-MCNC: 25.3 MG/DL (ref 8–23)
BUN SERPL-MCNC: 34.5 MG/DL (ref 8–23)
CALCIUM SERPL-MCNC: 8 MG/DL (ref 8.8–10.4)
CALCIUM SERPL-MCNC: 8.2 MG/DL (ref 8.8–10.4)
CHLORIDE SERPL-SCNC: 92 MMOL/L (ref 98–107)
CHLORIDE SERPL-SCNC: 94 MMOL/L (ref 98–107)
CREAT SERPL-MCNC: 0.85 MG/DL (ref 0.67–1.17)
CREAT SERPL-MCNC: 0.92 MG/DL (ref 0.67–1.17)
EGFRCR SERPLBLD CKD-EPI 2021: 87 ML/MIN/1.73M2
EGFRCR SERPLBLD CKD-EPI 2021: >90 ML/MIN/1.73M2
EOSINOPHIL # BLD AUTO: 0.1 10E3/UL (ref 0–0.7)
EOSINOPHIL NFR BLD AUTO: 1 %
ERYTHROCYTE [DISTWIDTH] IN BLOOD BY AUTOMATED COUNT: 17.6 % (ref 10–15)
GLUCOSE SERPL-MCNC: 89 MG/DL (ref 70–99)
GLUCOSE SERPL-MCNC: 91 MG/DL (ref 70–99)
HCO3 BLDV-SCNC: 27 MMOL/L (ref 21–28)
HCO3 BLDV-SCNC: 31 MMOL/L (ref 21–28)
HCO3 BLDV-SCNC: 31 MMOL/L (ref 21–28)
HCO3 BLDV-SCNC: 32 MMOL/L (ref 21–28)
HCO3 SERPL-SCNC: 26 MMOL/L (ref 22–29)
HCO3 SERPL-SCNC: 28 MMOL/L (ref 22–29)
HCT VFR BLD AUTO: 26.7 % (ref 40–53)
HGB BLD-MCNC: 8.8 G/DL (ref 13.3–17.7)
IMM GRANULOCYTES # BLD: 0.1 10E3/UL
IMM GRANULOCYTES NFR BLD: 1 %
INR PPP: 1.23 (ref 0.85–1.15)
LACTATE SERPL-SCNC: 0.6 MMOL/L (ref 0.7–2)
LYMPHOCYTES # BLD AUTO: 0.3 10E3/UL (ref 0.8–5.3)
LYMPHOCYTES NFR BLD AUTO: 4 %
MAGNESIUM SERPL-MCNC: 2.1 MG/DL (ref 1.7–2.3)
MCH RBC QN AUTO: 31.4 PG (ref 26.5–33)
MCHC RBC AUTO-ENTMCNC: 33 G/DL (ref 31.5–36.5)
MCV RBC AUTO: 95 FL (ref 78–100)
MONOCYTES # BLD AUTO: 0.4 10E3/UL (ref 0–1.3)
MONOCYTES NFR BLD AUTO: 5 %
NEUTROPHILS # BLD AUTO: 7.1 10E3/UL (ref 1.6–8.3)
NEUTROPHILS NFR BLD AUTO: 89 %
NRBC # BLD AUTO: 0 10E3/UL
NRBC BLD AUTO-RTO: 0 /100
O2/TOTAL GAS SETTING VFR VENT: 21 %
OXYHGB MFR BLDV: 56 % (ref 70–75)
OXYHGB MFR BLDV: 66 % (ref 70–75)
PCO2 BLDV: 37 MM HG (ref 40–50)
PCO2 BLDV: 43 MM HG (ref 40–50)
PCO2 BLDV: 46 MM HG (ref 40–50)
PCO2 BLDV: 46 MM HG (ref 40–50)
PH BLDV: 7.43 [PH] (ref 7.32–7.43)
PH BLDV: 7.44 [PH] (ref 7.32–7.43)
PH BLDV: 7.46 [PH] (ref 7.32–7.43)
PH BLDV: 7.48 [PH] (ref 7.32–7.43)
PLATELET # BLD AUTO: 233 10E3/UL (ref 150–450)
PO2 BLDV: 31 MM HG (ref 25–47)
PO2 BLDV: 31 MM HG (ref 25–47)
PO2 BLDV: 33 MM HG (ref 25–47)
PO2 BLDV: 36 MM HG (ref 25–47)
POTASSIUM SERPL-SCNC: 3.8 MMOL/L (ref 3.4–5.3)
POTASSIUM SERPL-SCNC: 4.2 MMOL/L (ref 3.4–5.3)
PROT SERPL-MCNC: 6.3 G/DL (ref 6.4–8.3)
RBC # BLD AUTO: 2.8 10E6/UL (ref 4.4–5.9)
SAO2 % BLDV: 57.1 % (ref 70–75)
SAO2 % BLDV: 57.4 % (ref 70–75)
SAO2 % BLDV: 58.1 % (ref 70–75)
SAO2 % BLDV: 67.7 % (ref 70–75)
SODIUM SERPL-SCNC: 129 MMOL/L (ref 135–145)
SODIUM SERPL-SCNC: 131 MMOL/L (ref 135–145)
SPECIMEN EXPIRATION DATE: NORMAL
UFH PPP CHRO-ACNC: 0.35 IU/ML
WBC # BLD AUTO: 7.9 10E3/UL (ref 4–11)

## 2024-09-17 PROCEDURE — 250N000013 HC RX MED GY IP 250 OP 250 PS 637

## 2024-09-17 PROCEDURE — 250N000011 HC RX IP 250 OP 636: Performed by: STUDENT IN AN ORGANIZED HEALTH CARE EDUCATION/TRAINING PROGRAM

## 2024-09-17 PROCEDURE — 99291 CRITICAL CARE FIRST HOUR: CPT | Mod: GC | Performed by: INTERNAL MEDICINE

## 2024-09-17 PROCEDURE — 85520 HEPARIN ASSAY: CPT

## 2024-09-17 PROCEDURE — 82947 ASSAY GLUCOSE BLOOD QUANT: CPT

## 2024-09-17 PROCEDURE — 71045 X-RAY EXAM CHEST 1 VIEW: CPT

## 2024-09-17 PROCEDURE — 83605 ASSAY OF LACTIC ACID: CPT | Performed by: STUDENT IN AN ORGANIZED HEALTH CARE EDUCATION/TRAINING PROGRAM

## 2024-09-17 PROCEDURE — 86923 COMPATIBILITY TEST ELECTRIC: CPT

## 2024-09-17 PROCEDURE — 86923 COMPATIBILITY TEST ELECTRIC: CPT | Performed by: NURSE PRACTITIONER

## 2024-09-17 PROCEDURE — 86900 BLOOD TYPING SEROLOGIC ABO: CPT | Performed by: INTERNAL MEDICINE

## 2024-09-17 PROCEDURE — 86901 BLOOD TYPING SEROLOGIC RH(D): CPT | Performed by: INTERNAL MEDICINE

## 2024-09-17 PROCEDURE — 85025 COMPLETE CBC W/AUTO DIFF WBC: CPT

## 2024-09-17 PROCEDURE — 85610 PROTHROMBIN TIME: CPT

## 2024-09-17 PROCEDURE — 83735 ASSAY OF MAGNESIUM: CPT

## 2024-09-17 PROCEDURE — 71045 X-RAY EXAM CHEST 1 VIEW: CPT | Mod: 26 | Performed by: RADIOLOGY

## 2024-09-17 PROCEDURE — 80053 COMPREHEN METABOLIC PANEL: CPT

## 2024-09-17 PROCEDURE — 250N000013 HC RX MED GY IP 250 OP 250 PS 637: Performed by: STUDENT IN AN ORGANIZED HEALTH CARE EDUCATION/TRAINING PROGRAM

## 2024-09-17 PROCEDURE — 82805 BLOOD GASES W/O2 SATURATION: CPT | Performed by: STUDENT IN AN ORGANIZED HEALTH CARE EDUCATION/TRAINING PROGRAM

## 2024-09-17 PROCEDURE — 200N000002 HC R&B ICU UMMC

## 2024-09-17 RX ORDER — BUMETANIDE 0.25 MG/ML
2 INJECTION INTRAMUSCULAR; INTRAVENOUS ONCE
Status: COMPLETED | OUTPATIENT
Start: 2024-09-17 | End: 2024-09-17

## 2024-09-17 RX ADMIN — Medication 1 TABLET: at 08:50

## 2024-09-17 RX ADMIN — ATORVASTATIN CALCIUM 80 MG: 80 TABLET, FILM COATED ORAL at 08:50

## 2024-09-17 RX ADMIN — METHOCARBAMOL 750 MG: 750 TABLET ORAL at 03:18

## 2024-09-17 RX ADMIN — ESCITALOPRAM OXALATE 10 MG: 10 TABLET ORAL at 08:50

## 2024-09-17 RX ADMIN — Medication 1 TABLET: at 20:57

## 2024-09-17 RX ADMIN — OXYCODONE HYDROCHLORIDE AND ACETAMINOPHEN 1000 MG: 500 TABLET ORAL at 08:50

## 2024-09-17 RX ADMIN — GABAPENTIN 100 MG: 100 CAPSULE ORAL at 20:57

## 2024-09-17 RX ADMIN — ASPIRIN 81 MG: 81 TABLET ORAL at 08:50

## 2024-09-17 RX ADMIN — ACETAMINOPHEN 1000 MG: 500 TABLET ORAL at 05:43

## 2024-09-17 RX ADMIN — BUMETANIDE 2 MG: 0.25 INJECTION INTRAMUSCULAR; INTRAVENOUS at 08:50

## 2024-09-17 RX ADMIN — MAGNESIUM OXIDE TAB 400 MG (241.3 MG ELEMENTAL MG) 400 MG: 400 (241.3 MG) TAB at 08:50

## 2024-09-17 RX ADMIN — OXYCODONE HYDROCHLORIDE 5 MG: 5 TABLET ORAL at 22:04

## 2024-09-17 RX ADMIN — CAPSAICIN: 0.25 CREAM TOPICAL at 05:45

## 2024-09-17 RX ADMIN — CHLORHEXIDINE GLUCONATE 0.12% ORAL RINSE 15 ML: 1.2 LIQUID ORAL at 08:49

## 2024-09-17 RX ADMIN — CHLORHEXIDINE GLUCONATE 0.12% ORAL RINSE 15 ML: 1.2 LIQUID ORAL at 20:57

## 2024-09-17 RX ADMIN — POTASSIUM CHLORIDE 10 MEQ: 750 TABLET, EXTENDED RELEASE ORAL at 08:50

## 2024-09-17 RX ADMIN — CAPSAICIN: 0.25 CREAM TOPICAL at 20:58

## 2024-09-17 RX ADMIN — ACETAMINOPHEN 1000 MG: 500 TABLET ORAL at 22:04

## 2024-09-17 RX ADMIN — ACETAMINOPHEN 1000 MG: 500 TABLET ORAL at 16:02

## 2024-09-17 RX ADMIN — HEPARIN SODIUM 1250 UNITS/HR: 10000 INJECTION, SOLUTION INTRAVENOUS at 16:02

## 2024-09-17 RX ADMIN — DIGOXIN 125 MCG: 125 TABLET ORAL at 08:50

## 2024-09-17 RX ADMIN — METHOCARBAMOL 750 MG: 750 TABLET ORAL at 20:57

## 2024-09-17 RX ADMIN — Medication 5 MG: at 22:04

## 2024-09-17 RX ADMIN — LIDOCAINE 1 PATCH: 4 PATCH TOPICAL at 20:58

## 2024-09-17 RX ADMIN — AMIODARONE HYDROCHLORIDE 200 MG: 200 TABLET ORAL at 08:51

## 2024-09-17 ASSESSMENT — ACTIVITIES OF DAILY LIVING (ADL)
ADLS_ACUITY_SCORE: 26

## 2024-09-17 ASSESSMENT — LIFESTYLE VARIABLES: TOBACCO_USE: 1

## 2024-09-17 NOTE — PROGRESS NOTES
[facility]  Cardiology II Service / Advanced Heart Failure  Daily Progress Note    Patient: Duane C Johnson      : 1950      MRN: [id number]    Assessment/Plan:   Duane C Johnson is a 74 year old male pmhx of anterior STEMI s/p PCI to the pLAD on 10/26/23 with a VT/VF arrest the next day (10/27) with patent stents and unchanged anatomy on repeat angiogram. Placed on amiodarone and discharged 23, ICD was not indicated as this was within 48h of his MI. His EF prior to discharge was 20-30% with a large area of akinesis in the LAD, placed on apixaban due to this (Apixaban dcd on 24). Has had multiple admissions for HF exacerbation last year.  Admitted on 24. He presents for 2 weeks of worsening fatigue; found to have BNP of 16k and L pleural effusion. Admitted for diuresis and RHC. CPX and RHC showed significant functional limitations due to heart failue. Plan for DT VAD while inpatient. Babbitt placed on . Pending OR on .    Today's changes:  - Plan for LVAD on   - C/w dobutamine  - spot diuresis for CVP goal 6-10  - Holding captopril for possible LVAD on   - Needs ICD turn off before OR, keep pacer in MRI mode given pacer dependence       # CAD s/p PCI to LAD  # STEMI  Anterior STEMI on 10/26/23 with a VT/VF arrest the next day. Repeat angio showed patent stents and unchanged anatomy.   - aspirin 81mg  -  Atorvastatin 80    # HFrEf 2/2 ICM (EF 15-20% by TTE 24)  Fluid status: Hypervolemic  ACEi/ARB/ARNi/afterload reduction: Captopril 6.25 mg TID, hold for SBP < 90  BB: Hold metoprolol due to ambulatory shock   Aldosterone antagonist: unable to start d/t hypotension   SGLT2i: Empagliflozin 10mg (held on  for possible surgeries)  SCD prophylaxis: s/p ICD 2024    Antiarrhythmic: Amiodarone 200mg   Diuretic: bumex prn     #Afib DCCV on   # h/o VT/VF arrest in 2023  # ICD placed on 24  Was on apixaban for low EF (akinesis of  the mid-distal anterior,mid anteroseptum, distal septum and the apex) and questionable history of Afib. Since there was no episode of Afib captured apixaban was stopped on 7/5/24. Device interrogation showing AF episode started on 8/28/24. Converted to A-paced rhythm after cardioversion on 9/5. Recurrence of AF on 9/7; now v-paced.   - PTA Amio 200    #LVAD evaluation   Pt has received financial clearance and LVAD evaluation is proceeding. Plan for LVAD on 9/18, CVTS is assessing and will determine actual date.   - Dental evaluation shows carries in all remaining teeth. Has completed extraction.  - Urology consulted for history of prostate cancer. Has completed therapy and last PSA was undetectable. This is not an obstacle to LVAD  - Abd US without evidence of chronic liver disease.   - No significant carotid stenosis   - ABIs normal   - Optimizing nutritional status   - cMRI with no thrombus    #Hyponatremia - improving   - treatment per above     #DOMENICA - Resolved  Baseline Cr normal at ~1. Cr of 1.55 on 8/24 with this acute episode of HF. Concern for type 1 cardiorenal syndrome. uPCR negative.   - Diuresis as above      # Moderate malnutrition  - Nutrition consulted to help manage  - Encourage high protein drinks     #h/o Prostate Cancer  Diagnosed in March 2023. Underwent Leuprolide on 3/19/24 and 6/11/24. Completed radiation treatment on 6/27/24   - Consider testosterone level as outpatient to assess for recovery after ADT    #L inguinal hernia  Pt reports this has been present for some time. Not causing any pain. Not a surgical candidate with current HF status so will not consult surgery at this time.     Hospital Checklist:  DVT Prophylaxis: Heparin gtt  Code Status: Full Code  Bowel regimen: PRN Miralax and Senna  Diet/Nutrition:   Lines: Left PIV    Thank you for allowing me to care for this patient, please don't hesitate to contact me with any questions regarding this plan.     Pt was discussed and evaluated  with Alonso Valle MD, attending physician, who agrees with the assessment and plan above.    Vladimir Johnson MD, PhD, MSc  Cardiology Fellow      09/17/2024  ==================================    Subjective:     VSS. No new events. No new complains.     Objective:     Vitals:    09/16/24 0553 09/16/24 0605 09/17/24 0600   Weight: 64.9 kg (143 lb 1.3 oz) 64.5 kg (142 lb 3.2 oz) 64.9 kg (143 lb 1.3 oz)     Vital Signs with Ranges  Temp:  [98.1  F (36.7  C)-98.2  F (36.8  C)] 98.1  F (36.7  C)  Pulse:  [70-76] 72  Resp:  [11-24] 17  BP: ()/(60-88) 112/83  SpO2:  [95 %-100 %] 99 %  I/O last 3 completed shifts:  In: 718.08 [P.O.:360; I.V.:358.08]  Out: 2440 [Urine:2440]    GENERAL: Sitting up in bed, no acute distress  HEENT: Atraumatic, moist mucus membranes, PERRL  NECK: No JVD. R SGC  RESP: Unlabored breathing on room air, no wheezes/rhonchi/crackles.   CARDIO: Regular rate and rhythm. extremities warm and well perfused, no peripheral edema  ABD: Non-distended, non-tender, bowel sounds active  NEURO: AAOx3, cranial nerves grossly intact, no focal deficits    Medications   Current Facility-Administered Medications   Medication Dose Route Frequency Provider Last Rate Last Admin    DOBUTamine (DOBUTREX) 500 mg in D5W 250 mL infusion (adult std conc)  2.5 mcg/kg/min Intravenous Continuous Mela Gonzalez MD 4.8 mL/hr at 09/17/24 1000 2.5 mcg/kg/min at 09/17/24 1000    heparin 25,000 units in 0.45% NaCl 250 mL ANTICOAGULANT infusion  0-5,000 Units/hr Intravenous Continuous Mela Gonzalez MD 12.5 mL/hr at 09/17/24 1000 1,250 Units/hr at 09/17/24 1000     Current Facility-Administered Medications   Medication Dose Route Frequency Provider Last Rate Last Admin    acetaminophen (TYLENOL) tablet 1,000 mg  1,000 mg Oral Q8H Cristobal Fisher MD   1,000 mg at 09/17/24 0543    amiodarone (PACERONE) tablet 200 mg  200 mg Oral Daily Cristobal Fisher MD   200 mg at 09/17/24 0851    aspirin EC tablet 81 mg  81 mg Oral Daily  Vladimir Johnson MD   81 mg at 09/17/24 0850    atorvastatin (LIPITOR) tablet 80 mg  80 mg Oral Daily Cristobal Fisher MD   80 mg at 09/17/24 0850    chlorhexidine (PERIDEX) 0.12 % solution 15 mL  15 mL Swish & Spit BID Cristobal Fisher MD   15 mL at 09/17/24 0849    escitalopram (LEXAPRO) tablet 10 mg  10 mg Oral Daily Cristobal Fisher MD   10 mg at 09/17/24 0850    gabapentin (NEURONTIN) capsule 100 mg  100 mg Oral At Bedtime Cristobal Fisher MD   100 mg at 09/16/24 2008    Lidocaine (LIDOCARE) 4 % Patch 1 patch  1 patch Transdermal Q24h Danial Fofana MD   1 patch at 09/16/24 2008    magnesium oxide (MAG-OX) tablet 400 mg  400 mg Oral Daily Cristobal Fisher MD   400 mg at 09/17/24 0850    multivitamin w/minerals (THERA-VIT-M) tablet 1 tablet  1 tablet Oral BID Cristobal Fisher MD   1 tablet at 09/17/24 0850    potassium chloride tray ER (KLOR-CON M10) CR tablet 10 mEq  10 mEq Oral Daily Cristobal Fisher MD   10 mEq at 09/17/24 0850    vitamin C (ASCORBIC ACID) tablet 1,000 mg  1,000 mg Oral Daily Cristobal Fisher MD   1,000 mg at 09/17/24 0850       Data   Recent Labs   Lab 09/17/24  0348 09/16/24  1735 09/16/24  0626 09/15/24  1201 09/15/24  0642   WBC 7.9  --  8.1  --  8.8   HGB 8.8*  --  9.4*  --  9.4*   MCV 95  --  98  --  97     --  280  --  252   INR 1.23*  --  1.24*  --  1.13   * 126* 124*   < > 125*   POTASSIUM 4.2 4.6 4.5   < > 4.8   CHLORIDE 92* 92* 90*   < > 92*   CO2 26 26 26   < > 26   BUN 34.5* 33.2* 41.9*   < > 40.3*   CR 0.92 0.82 0.87   < > 0.90   ANIONGAP 11 8 8   < > 7   PRANAV 8.2* 8.6* 8.3*   < > 8.4*   GLC 89 90 93   < > 102*   ALBUMIN 3.0*  --  2.9*  --  2.9*   PROTTOTAL 6.3*  --  6.4  --  6.4   BILITOTAL 0.4  --  0.4  --  0.4   ALKPHOS 87  --  91  --  94   ALT 34  --  37  --  40   AST 29  --  29  --  34    < > = values in this interval not displayed.     RHC 9/3/24    RA: 11/11/9  RV:39/9  PA:39/16/26  PCWP:16/22/15    Pa Sat:48.2%  Goldy; CO/CI: 2.91/1.71  Thermodilution;  CO/CI:3.53/2.08   Right sided filling pressures are normal.   Left sided filling pressures are mildly elevated. Mild elevated pulmonary hypertension.   Reduced cardiac output level.         Echocardiogram Complete   Result Value    LVEF  15-20% (severely reduced)    [facility]    [id number]  [id number]  [id number]  [id number]^ROXANA^SONIA     [facility],[place]  Echocardiography Laboratory  [address] [place] [address]     Name: JOHNSON, DUANE C  MRN: [id number]  : 1950  Study Date: 2024 08:20 AM  Age: 74 yrs  Gender: Male  Patient Location: [facility]  Reason For Study: CHF  Ordering Physician: SONIA SCHMIDT  Performed By: Yessenia Cano RDCS     BSA: 1.7 m2  Height: 66 in  Weight: 145 lb  HR: 71  BP: 94/73 mmHg  ______________________________________________________________________________  Procedure  Complete Portable Echo Adult. Contrast Optison. Optison (NDC #6745-8704-32)  given intravenously. Patient was given 6 ml mixture of 3 ml Optison and 6 ml  saline. 3 ml wasted.  ______________________________________________________________________________  Interpretation Summary  Left ventricular function is decreased. The ejection fraction is 15-20%  (severely reduced).  Moderate left ventricular dilation is present.  Apical wall akinesis is present.  Severe diffuse hypokinesis is present.  There is no thrombus seen in the left ventricle.  The right ventricle is normal size.  Global right ventricular function is normal.  Mild functional mitral insufficiency is present.  Mild to moderate tricuspid functional insufficiency is present.  Pulmonary hypertension is present.The right ventricular systolic pressure is  40mmHg above the right atrial pressure.  IVC diameter and respiratory changes fall into an intermediate range  suggesting an RA pressure of 8 mmHg.     This study was compared with the study from 2024 .MR, TR improved. LVEF  similar in both studies compared side  to side. So overall no change in LVEF  ______________________________________________________________________________  Left Ventricle  Left ventricular wall thickness is normal. Moderate left ventricular dilation  is present. Left ventricular diastolic function is not assessable. Left  ventricular function is decreased. The ejection fraction is 15-20% (severely  reduced). Apical wall akinesis is present. Severe diffuse hypokinesis is  present. There is no thrombus seen in the left ventricle.     Right Ventricle  The right ventricle is normal size. Global right ventricular function is  normal. A pacemaker lead is noted in the right ventricle.     Atria  Moderate left atrial enlargement is present. Moderate right atrial enlargement  is present.     Mitral Valve  Mild mitral insufficiency is present.     Aortic Valve  Trileaflet aortic sclerosis without stenosis. On Doppler interrogation, there  is no significant stenosis or regurgitation.     Tricuspid Valve  Mild to moderate tricuspid insufficiency is present. The right ventricular  systolic pressure is approximated at 39.7 mmHg plus the right atrial pressure.  Pulmonary hypertension is present. The right ventricular systolic pressure is  40mmHg above the right atrial pressure.     Pulmonic Valve  On Doppler interrogation, there is no significant stenosis or regurgitation.     Vessels  The aorta root is normal. IVC diameter and respiratory changes fall into an  intermediate range suggesting an RA pressure of 8 mmHg.     Pericardium  No pericardial effusion is present.     Compared to Previous Study  This study was compared with the study from 2/19/2024 . MR, TR improved. LVEF  similar in both studies compared side to side. So overall no change in LVEF.  ______________________________________________________________________________  MMode/2D Measurements & Calculations     IVSd: 0.89 cm  LVIDd: 6.0 cm  LVIDs: 5.5 cm  LVPWd: 0.79 cm  FS: 7.1 %  LV mass(C)d: 197.0  grams  LV mass(C)dI: 112.9 grams/m2  LVOT diam: 2.0 cm  LVOT area: 3.2 cm2  EF Biplane: 16.8 %  LA Volume (BP): 71.3 ml  LA Volume Index (BP): 41.0 ml/m2  RV Base: 4.6 cm  RWT: 0.27     TAPSE: 1.0 cm     Doppler Measurements & Calculations  MV E max ivan: 82.7 cm/sec  LV V1 max P.9 mmHg  LV V1 max: 84.2 cm/sec  LV V1 VTI: 13.8 cm  MR PISA: 4.2 cm2  MR ERO: 0.33 cm2  MR volume: 35.7 ml  SV(LVOT): 43.8 ml  SI(LVOT): 25.1 ml/m2  PA acc time: 0.08 sec  TR max ivan: 315.1 cm/sec  TR max P.7 mmHg  RV S Ivan: 10.7 cm/sec     ______________________________________________________________________________  Report approved by: Jeanine MEJIA 2024 11:05 AM            Examination: XR CHEST 2 VIEWS 2024 3:06 PM     Indication: Shortness of breath     Comparison: Radiograph 2024. CT 2023.     Findings:  PA and lateral views of the chest. Left chest wall implantable cardiac  defibrillator with grossly intact leads/shock coil. Coronary stenting.  Trachea is midline. Stable mild cardiomegaly. Mild blunting of the  left costophrenic angle, likely representing small pleural effusion.  No pneumothorax. No focal consolidation or any definite abnormal  airspace opacities. No acute or suspicious osseous abnormality.  Unremarkable visualized abdomen.      Impression   Impression:   1. Stable mild cardiomegaly with implantable cardiac defibrillator and  coronary stenting.  2. Small left pleural effusion.     I have personally reviewed the examination and initial interpretation  and I agree with the findings.     PANCHITO EID MD         SYSTEM ID:  [id number]      2023 CMR  Clinical history: 73 year old with anterior STEMI, cardiac arrest in Oct 2023  Comparison CMR: none  1. The left ventricle is severely enlarged. There is wall thinning and akinesis of the mid-distal anterior,  mid anteroseptum, distal septum and the apex  The global systolic function is severely reduced. The LVEF is 28%.  2. The right  ventricle is normal in cavity size. The global systolic function is normal. The RVEF is 52%.   3. The right atrium is normal and the left atrium is severely enlarged.  4. There is mild mitral regurgitation.   5. There is transmural late gadolinium enhancement in mid-distal anterior, mid anteroseptum, distal  septum and the apex consistent with a large infarction in the LAD territory.   6. There is no pericardial effusion.  7. There is no intracardiac thrombus.  8. There are bilateral pleural effusions.  CONCLUSIONS:   Ischemic cardiomyopathy with a large anteroapical infarction.   Severely reduced left ventricular function and normal right ventricular function, LVEF 28% and RVEF 52%.     Cardiac Catheterization:  10/26/23    1st Mrg lesion is 20% stenosed.    Prox LAD to Mid LAD lesion is 100% stenosed.    1st Diag-1 lesion is 80% stenosed.    1st Diag-2 lesion is 50% stenosed.    Dist LAD lesion is 100% stenosed.    RPDA lesion is 70% stenosed.    Dist RCA lesion is 40% stenosed.     Anterior STEMI  Two vessel obstructive CAD (100% pLAD occlusion, 70% rPDA stenosis, 80% diagonal 1 stenosis)  PCI of pLAD to mLAD with BRENDA x 1 (Synergy 2.50x 28 mm) post dilated to 2.75 vessel  CVC placement in RIJ  LVEDP of 26 mmHg  Hemostasis of RRA with TR band      2/19/2024 Echo  Interpretation Summary     1. The left ventricle is moderately dilated. Left ventricular hypertrophy is  noted by two-dimensional echocardiography. Proximal septal thickening is  noted. Left ventricular systolic function is moderate to severely reduced. The  visual ejection fraction is 25-30%. Biplane LVEF is 25%. Diastolic Doppler  findings (E/E' ratio and/or other parameters) suggest left ventricular filling  pressures are increased. There is severe hypokinesis to akinesis of the mid  anterior/anterolateral/anteroseptal/inferoseptal walls and all apical segments  of the left ventricle. There is no thrombus seen in the left ventricle.  2. The right  ventricle is normal size. The right ventricular systolic function  is normal.  3. The left atrium is moderately dilated. Right atrial size is normal. There  is no color Doppler evidence of an atrial shunt.  4. There is moderate to mod-severe (2-3+) mitral regurgitation.  5. There is mild to moderate (1-2+) tricuspid regurgitation.Right ventricular  systolic pressure is elevated, consistent with moderate pulmonary  hypertension.  6. No pericardial effusion.  7. In direct comparison to the previous study dated 10/30/2023, there has been  an interval increase in the degree of mitral regurgitation. The other findings  are similar.     RHC 5/6/24  RA: 10/9/6 mmHg  RV: 61/11 mmHg  PA: 63/24/38 mmHg  PCWP: Unable to obtain accurate measurement  CO/CI (Goldy): 3.89/2.3 L/min/m2    PA sat: 64%  MAP: 85 mmHg       Right sided filling pressures are normal.    Mild elevated pulmonary hypertension.    Normal cardiac output level.        Device interrogated on 8/30/24  Device: [facility] [id number] RESONATE HF ICD  Normal device function.  Mode: DDDR  bpm  AP: 33%  : 3%  Intrinsic rhythm: Afib w/ VS  bpm  Thoracic Impedance: Below reference line, suggests possible intrathoracic fluid accumulation.  Lead Trends Appear Stable.  Estimated battery longevity to RRT = 12.5 years.    Atrial Arrhythmia: AF episode began on 8/28/24  AF [place]: 11%  Anticoagulant: Eliquis  Ventricular Arrhythmia: None  Setting Changes: Rate response turned ON in device, pacing mode changed to DDDR from DDD, fallback rate during AT/AF episodes 70 bpm.

## 2024-09-17 NOTE — H&P
CV ICU H&P  9/18/2024    ASSESSMENT:  Duane C Johnson is a 74 year old male pmhx of CAD, STEMI s/p PCI to pLAD c/b VT/VF arrest in 2023, HFrEF 2/2 ICM (EF 10-15%), Afib s/p DDV, and s/p ICD placed 5/2024, PAC, HLD, prostate cancer, and anxiety who underwent LVAD placement (HeartMate 3) on 9/18/2024 by Dr. Mulvihill.    Arrives to the CVICU intubated and sedated on 50 propofol gtt. On 0.05 epi gtt and 2.5 DB gtt for inotropic support, 0.03 norepi gtt and 1  gtt for pressor support. Underwent LVAD placement (HeartMate 3), CT x4 placed (anterior pericardial, anterior mediastinal, mediastinal, pleural). Bypass time 110 minutes,  ml, off bypass required no shock. Required 300 ml pRBCs, 240 ml plts, 160 ml ml cell saver, 1000 units KCENTRA, 2 L crystalloid. Access RIJ CVC with PA catheter, L forearm PIV, and R radial A line. Reversed.     Pre procedure Echo: LVEF 10-15%. Mildly reduced RV systolic function. Grade III DD. Moderate MR.      Post procedure Echo: S/p HM3 placement. Global LV systolic function mildly improved. RV function appears unchanged. No new RWMAs. MR is now mild. IVS appears midline and not bowing. Inflow cannula is appropriately positioned in the LV apex, directed toward the MV with a Vmax 110 cm/s. Outflow cannula visualized on the anterior ascending aorta with good laminar flow and Vmax 190 cm/s and Vmin 73 cm/s.    CO-MORBIDITIES:   Patient Active Problem List   Diagnosis    Hyperlipidemia LDL goal <130    Prostate cancer (H)    Systolic CHF (H)    Anxiety    Atherosclerosis of native coronary artery of native heart with angina pectoris (H24)    Left inguinal hernia    Ischemic cardiomyopathy    Anticoagulated    Hypotension    Acute on chronic systolic congestive heart failure (H)    Personal history of malignant neoplasm of prostate    Pleural effusion    History of tobacco use    AICD (automatic cardioverter/defibrillator) present    Acute exacerbation of CHF (congestive heart failure)  (H)    Atrial fibrillation, unspecified type (H)     PTA Medications:  - Amiodarone 200 mg daily  - Atorvastatin 80 mg daily  - Cetirizine 10 mg daily  - Clopidogrel 10 mg daily  - Empagliflozin 10 mg daily  - Lisinopril 2.5 mg daily  - Magnesium oxide 400 mg daily  - Melatonin 5 mg qHS  - Metoprolol succinate ER 12.5 mg daily  - Potassium chloride ER 20 mEq daily    PLAN:  Neuro/ pain/ sedation:  - Monitor neurological status. Notify the MD for any acute changes in exam.    #Acute Postoperative pain  - Pain: Scheduled tylenol. PRN tylenol, oxycodone  - Gtt: Fentanyl + boluses    #Sedation  - Sedation: propofol gtt + boluses  - RASS goal -1 to -2    Pulmonary care:   #Postoperative ventilation management  Resp: 12, Vent Mode: CMV/AC, Resp Rate (Set): 12 breaths/min, Tidal Volume (Set, mL): 450 mL, PEEP (cm H2O): 5 cmH2O, Resp Rate (Set): 12 breaths/min, Tidal Volume (Set, mL): 450 mL, PEEP (cm H2O): 5 cmH2O   - Intubated, ventilated  - Defer PST until off inhaled agents.   - Titrate supplemental oxygen to maintain saturation above 92%.  - [place] for RV support. See below.  - Pulmonary hygiene: Incentive spirometer every 15- 30 minutes when awake, flutter valve, C&DB    Cardiovascular:    #S/p LVAD placement (HeartMate 3) on 9/18/24 by Dr. Mulvihill  # Post-op cardiogenic shock  # Post-op vasoplegia  # History of CAD  # STEMI s/p PCI to LAD in 2023 c/b VT/VF arrest   # HFrEF 2/2 ICM (MELBA 09/05/2024 with LVEF 10-15%)  # S/p ICD 5/8/24  # A fib s/p cardioversion on 9/5/24, v-paced rhythm  # HLD  Per chart review, patient sustained anterior STEMI s/p PCI to the pLAD on 10/26/23 c/b VT/VF arrest the next day (10/27) with patent stents and unchanged anatomy on repeat angiogram. Placed on amiodarone and discharged 11/03/23, ICD was not indicated as this was within 48h of his MI. His EF prior do discharge was 20-30% with a large area of akinesis in the LAD initially placed on apixaban due to this but apixaban was stopped  7/5/24. Ultimately underwent ICD placement in 5/2024. S/p cardioversion on 9/5/24 but reverted back to AF on 9/7/24.  - Monitor hemodynamic status  - Goal MAP 65-85  - Epi, DB gtt for inotropic support, wean as tolerated  - Norepi, vaso gtt for pressor support; wean as tolerated starting with norepi.  - Inhaled nitric at 10 PPM. Started intra-op empirically for RV support. Okay to wean once hemostasis achieved.   - ICD device interrogation by EP nurse  - Hold current admission meds: ASA 81 mg, atorvastatin 80 mg every day, captopril 6.25 mg tid, metoprolol, empagliflozin 10 mg, amiodarone 200 mg     GI care/ Nutrition:   # Concern for protein-calorie malnutrition  - Nutrition consulted, appreciated recommendations  - Currently NPO   - Radiology consult for [place] placement tomorrow  - PPI  - Bowel regimen: scheduled Miralax, senna    Renal/ Fluid Balance/ Electrolytes:   # Post-op lactic acidosis  # DOMENICA, resolved  # Hyponatremia, resolving  BL creat appears to be ~ 1. DOMENICA during current admission to Cr 1.55 on 8/24/24 2/2 acute HF exacerbation, postoperative Cr at 0.96  - Postoperative lactate of 2.4  - Strict I/O, daily weights  - Avoid/limit nephrotoxins as able  - Replete lytes PRN per protocol  - Serial Lactate acid levels until resolution    # Hx prostate cancer sp Leuprolide and radiation therapy   - Completed Leuprolide on 6/11/24 and radiation therapy on 6/27/24. Last PSA was <0.01 on 7/29/24    Endocrine:    #Stress induced hyperglycemia  Preop A1c 4.8% on 3/13/23  - Insulin gtt  - Goal BG <180 for optimal healing    ID/ Antibiotics:  # Stress induced leukocytosis  # Prophylactic perioperative antibiotics  - To complete perioperative regimen -- fluc, levaquin, rifampin, vanc  - Continue to monitor fever curve, WBC and inflammatory markers as appropriate    Heme:     #Acute blood loss anemia  #Acute blood loss thrombocytopenia  #Stress induced leukocytosis  Patient has had significant chest tube output since  surgery, approximately 800 ml serosanguinous to sanguinous output in 2-3 hours as well as clotting in four chest tubes requiring frequent stripping. Noted to have friable epicardium during surgery. Hgb decreased from 8.8 preoperatively to 7.7, platelets decreased from 220 to 129, INR 1.44. PTT increasing 46 --> 66. Epi increased to 0.07 and  to 2 for MAPs decreasing to 50s. Received 500 ml LR for CVP of 4.   - S/p 2 unit pRBCs,1 unit platelets, 2 units FFP  - Quantra for coagulation monitoring  - Protamine 100 over 4 hours per CVTS attending. Did receive 250 mg intra-op. Quantra, elevated PTT, with detected Xa indicates some heparin effect could still be present.  - CBC q6h   - Serial coags  - Continue to monitor    MSK/ Skin:  # Sternotomy  # Surgical Incision  - Sternal precautions  - Postoperative incision management per protocol  - PT/OT/CR    Prophylaxis:    - Mechanical prophylaxis for DVT  - Chemical DVT prophylaxis - start subcutaneous heparin tomorrow  - PPI     Lines/ tubes/ drains:  - ETT  - RIJ CVC, PA catheter  - R Radial Arterial Line  - PIV L forearm  - CTs x4 (anterior pericardial, anterior mediastinal, mediastinal, pleural)  - Michael  - OG    Disposition:  - CVICU    Patient seen, findings and plan discussed with CVICU staff.    Nessa Tovar, MS4  [employer]    ATTESTATION:  I, Yessenia Peralta, APRN CNP, was present with the medical/URIAH student who participated in the service and in the documentation of the note.  I have verified the history and personally performed the physical exam and medical decision making.  I agree with the assessment and plan of care as documented in the note.      Neuro: Sedated, RASS -3. Prop + fent  Pulm: Intubated, adequate gas exchange on ABG. No PST until off inhaled agents  CV: HM3, flowing 3.7 LPM. PI 2.6. on dobut 2.5, epi/norepi/vaso gtts. Paced at 80 by PPM. Inhaled nitric for RV support  GI: NPO, OG in place. Planning for rads to place [place] tomorrow per  policy  Renal: Making urine, lytes stable. LA mildly elevated 2.6, continue to trend.  Endo: Insulin gtt  ID: Periop prophy, no s/sx infection  Heme: Coagulopathy present, bleeding from chest tubes. Resuscitation ongoing. D/w CVTS staff ongoing.  MSK/Skin: Sternal incision dressing c/d/I. 4 chest tubes with clotty sanguinous output, no wounds noted.    Yessenia Peralta, APRN CNP  Date of Service (when I saw the patient): 09/18/24      Clinically Significant Risk Factors         # Hyponatremia: Lowest Na = 126 mmol/L in last 2 days, will monitor as appropriate  # Hypocalcemia: Lowest iCa = 4 mg/dL in last 2 days, will monitor and replace as appropriate     # Hypoalbuminemia: Lowest albumin = 2.6 g/dL at 9/4/2024  6:26 AM, will monitor as appropriate  # Coagulation Defect: INR = 1.40 (Ref range: 0.85 - 1.15) and/or PTT = 66 Seconds (Ref range: 22 - 38 Seconds), will monitor for bleeding  # Thrombocytopenia: Lowest platelets = 110 in last 2 days, will monitor for bleeding    # End stage heart failure: Ventricular assist device (VAD) present    # Acute Hypoxic Respiratory Failure: Documented O2 saturation < 90%. Continue supplemental oxygen as needed          # Moderate Malnutrition: based on nutrition assessment    # Financial/Environmental Concerns: none   # ICD device present              ====================================    HPI:   Duane C Johnson is a 74 year old male pmhx of anterior STEMI s/p PCI to the pLAD on 10/26/23 with a VT/VF arrest the next day (10/27) with patent stents and unchanged anatomy on repeat angiogram. Placed on amiodarone and discharged 11/03/23, ICD was not indicated as this was within 48h of his MI. His EF prior to discharge was 20-30% with a large area of akinesis in the LAD, placed on apixaban due to this (Apixaban dcd on 7/5/24). Has had multiple admissions for HF exacerbation last year.  Admitted on 8/30/24. He presents for 2 weeks of worsening fatigue; found to have BNP of 16k and  L pleural effusion. Admitted for diuresis and RHC. CPX and RHC showed significant functional limitations due to heart failue. Plan for DT VAD while inpatient. [place] placed on 9/16. Plan for LVAD placement with Dr. Mulvihill on 9/18/24.       PAST MEDICAL HISTORY:   Past Medical History:   Diagnosis Date    Benign essential hypertension     CAD (coronary artery disease)     Chronic systolic heart failure (H)     Hypertension     ST elevation myocardial infarction (STEMI), unspecified artery (H)     Ventricular fibrillation (H)        PAST SURGICAL HISTORY:   Past Surgical History:   Procedure Laterality Date    ANESTHESIA CARDIOVERSION N/A 9/5/2024    Procedure: Anesthesia cardioversion;  Surgeon: GENERIC ANESTHESIA PROVIDER;  Location: [facility] OR    COLONOSCOPY N/A 3/23/2023    Procedure: COLONOSCOPY, FLEXIBLE, WITH LESION REMOVAL USING SNARE;  Surgeon: Jose Faustin MD;  [facility]    CV CENTRAL VENOUS CATHETER PLACEMENT N/A 10/26/2023    Procedure: Central Venous Catheter Placement;  Surgeon: Rob Lyles MD;  Location: [facility] CARDIAC CATH LAB    CV CORONARY ANGIOGRAM N/A 10/27/2023    Procedure: Coronary Angiogram;  Surgeon: Rob Lyles MD;  Location:  HEART CARDIAC CATH LAB    CV CORONARY ANGIOGRAM N/A 10/26/2023    Procedure: Coronary Angiogram;  Surgeon: Rob Lyles MD;  Location: [facility] CARDIAC CATH LAB    CV LEFT HEART CATH N/A 10/26/2023    Procedure: Left Heart Catheterization;  Surgeon: Rob Lyles MD;  Location: [facility] CARDIAC CATH LAB    CV PCI N/A 10/27/2023    Procedure: Percutaneous Coronary Intervention;  Surgeon: Rob Lyles MD;  Location: [facility] CARDIAC CATH LAB    CV PCI STENT DRUG ELUTING N/A 10/26/2023    Procedure: Percutaneous Coronary Intervention Stent;  Surgeon: Rob Lyles MD;  Location: [facility] CARDIAC CATH LAB    CV RIGHT HEART CATH MEASUREMENTS RECORDED N/A 5/6/2024    Procedure: Heart Cath Right Heart  Cath;  Surgeon: Rob Lyles MD;  Location:  HEART CARDIAC CATH LAB    CV RIGHT HEART CATH MEASUREMENTS RECORDED N/A 9/3/2024    Procedure: Right Heart Catheterization;  Surgeon: Aaron Mesa MD;  Location: [facility] CARDIAC CATH LAB    EP ICD INSERT SINGLE N/A 2024    Procedure: Implantable Cardioverter Defibrillator Device & Lead Implant Dual;  Surgeon: Guero Black MD;  Location:  HEART CARDIAC CATH LAB    INJECTION, HYDROGEL SPACER N/A 2024    Procedure: INJECTION, HYDROGEL SPACER AND FIDUCIAL MARKER PLACEMENT;  Surgeon: Stanislaw Barros MD;  Location: [facility] OR       FAMILY HISTORY:   Family History   Problem Relation Age of Onset    Other Cancer Mother     Obesity Mother     GI problems Father     Uterine Cancer Sister        SOCIAL HISTORY:   Social History     Tobacco Use    Smoking status: Former     Current packs/day: 0.00     Average packs/day: 0.3 packs/day for 30.0 years (7.5 ttl pk-yrs)     Types: Cigarettes     Start date: 10/26/1993     Quit date: 10/26/2023     Years since quittin.8     Passive exposure: Past    Smokeless tobacco: Never    Tobacco comments:     Quit 10/26/2023   Substance Use Topics    Alcohol use: Yes     Comment: 1-2 beers per day         OBJECTIVE:   1. VITAL SIGNS  Temp:  [97.3  F (36.3  C)-98.4  F (36.9  C)] 97.3  F (36.3  C)  Pulse:  [70-92] 86  Resp:  [6-23] 12  BP: ()/(57-82) 95/65  MAP:  [68 mmHg-90 mmHg] 82 mmHg  Arterial Line BP: ()/(57-83) 87/69  SpO2:  [95 %-100 %] 96 %  Resp: 12, Vent Mode: CMV/AC, Resp Rate (Set): 12 breaths/min, Tidal Volume (Set, mL): 450 mL, PEEP (cm H2O): 5 cmH2O, Resp Rate (Set): 12 breaths/min, Tidal Volume (Set, mL): 450 mL, PEEP (cm H2O): 5 cmH2O    2. INTAKE/ OUTPUT  I/O last 3 completed shifts:  In: 3976.83 [P.O.:240; I.V.:2886.83; Other:160; IV Piggyback:150]  Out: 3330 [Urine:2110; Blood:500; Chest Tube:720]       3. PHYSICAL EXAMINATION:   General: Intubated and sedated, NAD  Neuro:  Sedated, difficult to perform full neuro exam with level of sedation  Resp: Intubated, ventilated (vent: RR 12, , FiO2 60%, PEEP 5), CTAB  CV: S1/S2 present, RRR, high pitched motor of LVAD present  Abdomen: Soft, non-distended  Incisions: surgical dressings in place c/d/I, no sanguinous breakthrough  Extremities: pallor, skin is warm to touch, no LE edema  CT: CTx4 to suction @-20, serosang to sanginous output, ~800 ml out over 2-3 hours since surgery, clotting present in all four chest tubes, no airleak    4. INVESTIGATIONS:   Arterial Blood Gases   Recent Labs   Lab 09/18/24  1348 09/18/24  1229 09/18/24  1133 09/18/24  1059   PH 7.40 7.40 7.46* 7.39   PCO2 45 44 38 45   PO2 238* 279* 530* 346*   HCO3 28 27 27 27     Complete Blood Count   Recent Labs   Lab 09/18/24  1451 09/18/24  1347 09/18/24  1229 09/18/24  1137 09/18/24  1133 09/18/24  0800 09/18/24  0344 09/17/24  0348   WBC 18.3* 17.3*  --   --   --   --  9.5 7.9   HGB 7.7* 8.1* 8.0*  --  8.7*   < > 8.8* 8.8*   * 130*  --  110*  --   --  220 233    < > = values in this interval not displayed.     Basic Metabolic Panel  Recent Labs   Lab 09/18/24  1350 09/18/24  1347 09/18/24  1229 09/18/24  1133 09/18/24  1059 09/18/24  0346 09/18/24  0344 09/17/24  1611 09/17/24  0348   NA  --  132* 131* 131* 130*   < > 128* 131* 129*   POTASSIUM  --  4.1 3.9 4.1 4.1   < > 4.7 3.8 4.2   CHLORIDE  --  97*  --   --   --   --  91* 94* 92*   CO2  --  25  --   --   --   --  28 28 26   BUN  --  28.8*  --   --   --   --  38.3* 25.3* 34.5*   CR  --  0.75  --   --   --   --  0.96 0.85 0.92   GLC 88 100* 122* 164* 187*   < > 107* 91 89    < > = values in this interval not displayed.     Liver Function Tests  Recent Labs   Lab 09/18/24  1451 09/18/24  1347 09/18/24  1137 09/18/24  0344 09/17/24  0348 09/16/24  0626   AST  --  71*  --  44 29 29   ALT  --  36  --  40 34 37   ALKPHOS  --  77  --  106 87 91   BILITOTAL  --  2.2*  --  0.3 0.4 0.4   ALBUMIN  --  2.7*  --   3.0* 3.0* 2.9*   INR 1.45* 1.44* 1.86* 1.24* 1.23* 1.24*     Pancreatic Enzymes  No lab results found in last 7 days.  Coagulation Profile  Recent Labs   Lab 09/18/24  1451 09/18/24  1347 09/18/24  1137 09/18/24  0344   INR 1.45* 1.44* 1.86* 1.24*   PTT 58* 46* 41*  --          5. RADIOLOGY:   Recent Results (from the past 24 hour(s))   XR Chest Port 1 View    Narrative    EXAM:  XR CHEST PORT 1 VIEW    INDICATION: [place] Cath    COMPARISON:  Chest x-ray 9/17/2024    FINDINGS:  Single AP view of the chest. Left chest cardiac device with stable  lead positioning. Right IJ [place] catheter terminates over the  right main pulmonary artery, with tip close to the interlobar  arteries.    Cardiomediastinal silhouette within normal limits. Retrocardiac  opacity silhouetting the diaphragm. No pneumothorax.  Possible left  pleural effusion.       Impression    IMPRESSION:  1.  Right IJ [place] catheter terminates over the right main  pulmonary artery, with tip increasingly closer to the interlobar  arteries. Consider slight retraction.  2.  Stable retrocardiac atelectasis/consolidation and left pleural  effusion.    I have personally reviewed the examination and initial interpretation  and I agree with the findings.    KHLOE FRANCISCO DO         SYSTEM ID:  [id number]   MELBA with Report    Narrative    Rio Quintanilla MD     9/18/2024 12:50 PM  Perioperative MELBA Procedure Note    Staff -        Anesthesiologist:  Rio Quintanilla MD       Resident/Fellow: Isaias Urisa DO       Performed By: anesthesiologist, fellow and with fellow       Procedure performed by resident/fellow/CRNA in presence of a teaching   physician.    Preanesthesia Checklist:  Patient identified, IV assessed, risks and   benefits discussed, monitors and equipment assessed, procedure being   performed at surgeon's request and anesthesia consent obtained.    MELBA Probe Insertion    Probe Status PRE Insertion: NO obvious damage  Probe type:  Adult 3D  Bite  block used:   Oral Airway  Insertion Technique: Easy, no oropharyngeal manipulation  Insertion complications: None obvious  Billing Report:MELBA report by Anesthesiologist (See Separate Report note)  Probe Status POST Removal: NO obvious damage    MELBA Report  General Procedure Information  Fellow/Resident Interpretation: I personally reviewed the images and the   fellow/resident interpretation.  I agree with the fellow/resident   interpretation as amended by myself.   Images for this study have been archived.  Modalities: 2D, 3D, CW Doppler, PW Doppler and Color flow mapping  Diagnostic indications for MELBA:   Non-ischemic cardiomyopathy  Echocardiographic and Doppler Measurements  Right Ventricle:  Cavity size dilated.    Hypertrophy not present.     Thrombus not present.    Global function mildly impaired.     Left Ventricle:  Cavity size dilated.    Hypertrophy present.   Thrombus   not present.   Global Function severely impaired.   Ejection Fraction 15%.        Ventricular Regional Function:  1- Basal Anteroseptal:  hypokinetic  2- Basal Anterior:  hypokinetic  3- Basal Anterolateral:  hypokinetic  4- Basal Inferolateral:  hypokinetic  5- Basal Inferior:  hypokinetic  6- Basal Inferoseptal:  hypokinetic  7- Mid Anteroseptal:  hypokinetic  8- Mid Anterior:  hypokinetic  9- Mid Anterolateral:  hypokinetic  10- Mid Inferolateral:  hypokinetic  11- Mid Inferior:  hypokinetic  12- Mid Inferoseptal:  hypokinetic  13- Apical Anterior:  hypokinetic  14- Apical Lateral:  hypokinetic  15- Apical Inferior:  hypokinetic  16- Apical Septal:  hypokinetic  17- [place]:  hypokinetic    Valves  Aortic Valve: Annulus normal.  Stenosis not present.  Area: 1.82 cm .    Regurgitation absent.  Leaflets normal.  Leaflet motions normal.    Mitral Valve: Annulus dilated.  Stenosis not present.  Regurgitation +3.    Leaflets normal.  Leaflet motions normal.    Tricuspid Valve: Annulus dilated.  Stenosis not present.  Regurgitation   +2.   Leaflets normal.  Leaflet motions normal.    Pulmonic Valve: Annulus normal.  Stenosis not present.  Regurgitation +1.        Aorta: Ascending Aorta: Size normal.  Diameter 3.52 cm.  Dissection not   present.  Plaque thickness less than 3 mm.  Mobile plaque not present.    Aortic Arch: Size normal.    Dissection not present.   Plaque thickness   less than 3 mm.   Mobile plaque not present.    Descending Aorta: Size normal.    Dissection not present.   Plaque   thickness less than 3 mm.   Mobile plaque not present.      Right Atrium:  Size normal.   Spontaneous echo contrast not present.     Thrombus not present.   Tumor not present.   Device present.   Left Atrium: Size normal.  Spontaneous echo contrast not present.    Thrombus not present.  Tumor not present.  Device not present.    Left atrial appendage normal.   Other Atria Findings:  BRIE velocity < 40 cm/s  Atrial Septum: Intra-atrial septal morphology normal.       Ventricular Septum: Intra-ventricular septum morphology normal.      Diastolic Function Measurements:  Diastolic Dysfunction Grade= III.  E=  ms.  A=  ms.  E/A Ratio= .  DT=    ms.  S/D= .  IVRT= ms.  Other Findings:   Pericardium:  normal. Pleural Effusion:  left. Pulmonary Arteries:    normal. Pulmonary Venous Flow:  blunted (decreased) systolic flow. No   coronary sinus catheter present.    Post Intervention Findings  Procedure(s) performed:  LVAD Placement. Global function:  Improved.   Regional wall motion: Unchanged. Surgeon(s) notified of all   postintervention findings: Yes.         LVAD Placement:  LV decompressed.   PFO not present. Aortic valve not opening.        Echocardiogram Comments  Echocardiogram comments:   PRE-CPB:   Severely reduced LV systolic function w/ LVEF 15%. Mildly reduced RV   systolic function. Grade III DD. No AS or AI. Moderate functional MR with   systolic PVF blunting. No intracardiac thrombus though BRIE velocity 30   cm/s. No intracardiac shunt by CFD and negative  bubble study. No   pericardial effusion. Left pleural effusion visualized. No aortic   dissection. All findings communicated to surgical team.    POST-CPB:  S/p HM3 placement. Global LV systolic function mildly improved. RV   function appears unchanged. No new RWMAs. MR is now mild. IVS appears   midline and not bowing. Inflow cannula is appropriately positioned in the   LV apex, directed toward the MV with a Vmax 110 cm/s. Outflow cannula   visualized on the anterior ascending aorta with good laminar flow and Vmax   190 cm/s and Vmin 73 cm/s. Pleural effusion no longer present. No   pericardial effusion. No aortic dissection. All findings communicated to   surgical team.  .   XR Chest Port 1 View    Narrative    EXAM: XR CHEST PORT 1 VIEW  9/18/2024 1:47 PM      HISTORY: Post Op CVTS Surgery    COMPARISON: Chest x-ray 0053 hours    FINDINGS: Single view of the chest. Endotracheal tube terminates in  the midthoracic trachea. Gastric tube sidehole projects in the stomach  with tip below the field of view. Right IJ [place] catheter tip  terminates in the right main pulmonary artery near the interlobar  artery. Left chest wall implantable cardiac defibrillator leads  project over the right atrium and right ventricle. LVAD. Multiple  chest tubes and mediastinal drains.    Trachea is midline. Cardiac silhouette is within normal limits. Patchy  perihilar and bibasilar opacities. No significant pleural effusion. No  pneumothorax.      Impression    IMPRESSION:   1. Postoperative changes in the chest with support devices as  described, including right IJ central venous catheter tip near the  right interlobar pulmonary artery. Consider slight retraction.  2. Patchy perihilar and bibasilar opacities, likely edema and/or  atelectasis.    I have personally reviewed the examination and initial interpretation  and I agree with the findings.    ANGELICA PRATHER MD         SYSTEM ID:  [id number]   XR Abdomen Port 1 View     Narrative    Exam: XR ABDOMEN PORT 1 VIEW, 9/18/2024 1:57 PM    Indication: OG placement    Comparison: CT chest abdomen and pelvis 9/3/2024.    Findings:   AP portable supine. OG tube tip and sidehole overlying the stomach.  Partially visualized sternotomy wires, mediastinal drains, LVAD and  cardiac pacer/automatic implantable cardiac defibrillator leads.  Please see same day chest radiographs. Moderate volume of stool. No  small bowel obstruction. Lucency in the right abdomen may represent a  skinfold versus postoperative pneumoperitoneum. No acute osseous  modalities.      Impression    Impression: OG tube tip and sidehole overlying the stomach.    PANCHITO EID MD         SYSTEM ID:  [id number]       =========================================

## 2024-09-17 NOTE — PROGRESS NOTES
Calorie Count  Intake recorded for: 9/16  Total Kcals: 749 Total Protein: 26g  Kcals from Hospital Food: 749  Protein: 26g  Kcals from Outside Food (average):0 Protein: 0g  # Meals Ordered from Kitchen: 1 meal   # Meals Recorded: 1 meal - 100% puree pork w/ gravy, puree broccoli, puree carrots, butter, banana, fruit ice  # Supplements Recorded: 0

## 2024-09-17 NOTE — PROGRESS NOTES
VAD coordinator assessed pt abdomen for potential VAD driveline exit site.     Discussed with pt any activities that may impact where driveline exits and side that they wear the controller.   They indicate no preference for the DL to exit on a specific side.   Assessed pt laying, sitting and standing and observed abdomen for any folds.   Assessed where pt waist band rests.   Used landmarks of 3 finger widths below rib cage at nipple line to accommodate modular cable anchoring externally.   Marked potential exit sites on right and left. Actual exit site to be determined by surgeon.   Site marking communicated to surgeon.

## 2024-09-17 NOTE — PROGRESS NOTES
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 Admitted for diuresis and RHC. EP consulted for consideration of DCCV, currently on heparin drip and holding apixaban. Does have slight DOMENICA likely 2/2 cardiorenal. CVTS was consulted for workup of potential LVAD implantation.       Plan for LVAD placement tomorrow 9/18 AM with Dr. Mulvihill  Preop orders signed and held, nursing to release per protocol  NPO at midnight  Hold heparin on call to OR  Please ensure patient's beard is shaved today.  T&S completed 9/17  Preop teaching completed with patient today  Other cares per primary team  Thank you for the opportunity to participate in the care of this patient. Please do not hesitate to call/page CVTS with questions.       Maranda Pool PA-C  Cardiothoracic Surgery  Pager [phone]  8:50 AM on 9/17/2024

## 2024-09-17 NOTE — PLAN OF CARE
Major Shift Events: Neuro intact. Mostly V paced. BP WNL. PA 49/23, CVP 9, CO 4.1, CI 2.4, SVR 1375. RA. Eating/drinking well. Plan is NPO at 0000. Bumex this am with good UOP. No BM.     Plan: LVAD placement tomorrow morning.    For vital signs and complete assessments, please see documentation flowsheets.

## 2024-09-18 ENCOUNTER — APPOINTMENT (OUTPATIENT)
Dept: GENERAL RADIOLOGY | Facility: CLINIC | Age: 74
DRG: 001 | End: 2024-09-18
Payer: MEDICARE

## 2024-09-18 ENCOUNTER — ANESTHESIA (OUTPATIENT)
Dept: SURGERY | Facility: CLINIC | Age: 74
DRG: 001 | End: 2024-09-18
Payer: MEDICARE

## 2024-09-18 ENCOUNTER — APPOINTMENT (OUTPATIENT)
Dept: GENERAL RADIOLOGY | Facility: CLINIC | Age: 74
DRG: 001 | End: 2024-09-18
Attending: SURGERY
Payer: MEDICARE

## 2024-09-18 ENCOUNTER — APPOINTMENT (OUTPATIENT)
Dept: GENERAL RADIOLOGY | Facility: CLINIC | Age: 74
DRG: 001 | End: 2024-09-18
Attending: INTERNAL MEDICINE
Payer: MEDICARE

## 2024-09-18 ENCOUNTER — ANESTHESIA EVENT (OUTPATIENT)
Dept: SURGERY | Facility: CLINIC | Age: 74
DRG: 001 | End: 2024-09-18
Payer: MEDICARE

## 2024-09-18 PROBLEM — Z87.891 HISTORY OF TOBACCO USE: Status: ACTIVE | Noted: 2024-09-18

## 2024-09-18 PROBLEM — Z85.46 PERSONAL HISTORY OF MALIGNANT NEOPLASM OF PROSTATE: Status: ACTIVE | Noted: 2024-09-18

## 2024-09-18 PROBLEM — Z95.810 AICD (AUTOMATIC CARDIOVERTER/DEFIBRILLATOR) PRESENT: Status: ACTIVE | Noted: 2024-09-18

## 2024-09-18 PROBLEM — I50.9 ACUTE EXACERBATION OF CHF (CONGESTIVE HEART FAILURE) (H): Status: ACTIVE | Noted: 2024-09-18

## 2024-09-18 PROBLEM — I48.91 ATRIAL FIBRILLATION, UNSPECIFIED TYPE (H): Status: ACTIVE | Noted: 2024-09-18

## 2024-09-18 PROBLEM — J90 PLEURAL EFFUSION: Status: ACTIVE | Noted: 2024-09-18

## 2024-09-18 LAB
ALBUMIN SERPL BCG-MCNC: 2.7 G/DL (ref 3.5–5.2)
ALBUMIN SERPL BCG-MCNC: 3 G/DL (ref 3.5–5.2)
ALLEN'S TEST: ABNORMAL
ALP SERPL-CCNC: 106 U/L (ref 40–150)
ALP SERPL-CCNC: 77 U/L (ref 40–150)
ALT SERPL W P-5'-P-CCNC: 36 U/L (ref 0–70)
ALT SERPL W P-5'-P-CCNC: 40 U/L (ref 0–70)
ANION GAP SERPL CALCULATED.3IONS-SCNC: 10 MMOL/L (ref 7–15)
ANION GAP SERPL CALCULATED.3IONS-SCNC: 11 MMOL/L (ref 7–15)
ANION GAP SERPL CALCULATED.3IONS-SCNC: 9 MMOL/L (ref 7–15)
ANION GAP SERPL CALCULATED.3IONS-SCNC: 9 MMOL/L (ref 7–15)
APTT PPP: 41 SECONDS (ref 22–38)
APTT PPP: 41 SECONDS (ref 22–38)
APTT PPP: 46 SECONDS (ref 22–38)
APTT PPP: 58 SECONDS (ref 22–38)
APTT PPP: 66 SECONDS (ref 22–38)
AST SERPL W P-5'-P-CCNC: 44 U/L (ref 0–45)
AST SERPL W P-5'-P-CCNC: 71 U/L (ref 0–45)
BASE EXCESS BLDA CALC-SCNC: 1.4 MMOL/L (ref -3–3)
BASE EXCESS BLDA CALC-SCNC: 1.8 MMOL/L (ref -3–3)
BASE EXCESS BLDA CALC-SCNC: 2.2 MMOL/L (ref -3–3)
BASE EXCESS BLDA CALC-SCNC: 2.3 MMOL/L (ref -3–3)
BASE EXCESS BLDA CALC-SCNC: 2.4 MMOL/L (ref -3–3)
BASE EXCESS BLDA CALC-SCNC: 2.7 MMOL/L (ref -3–3)
BASE EXCESS BLDA CALC-SCNC: 3 MMOL/L (ref -3–3)
BASE EXCESS BLDA CALC-SCNC: 3 MMOL/L (ref -3–3)
BASE EXCESS BLDA CALC-SCNC: 3.3 MMOL/L (ref -3–3)
BASE EXCESS BLDA CALC-SCNC: 3.4 MMOL/L (ref -3–3)
BASE EXCESS BLDA CALC-SCNC: 4.2 MMOL/L (ref -3–3)
BASE EXCESS BLDA CALC-SCNC: 4.5 MMOL/L (ref -3–3)
BASE EXCESS BLDA CALC-SCNC: 4.6 MMOL/L (ref -3–3)
BASE EXCESS BLDA CALC-SCNC: 5.1 MMOL/L (ref -3–3)
BASE EXCESS BLDV CALC-SCNC: 1.1 MMOL/L (ref -3–3)
BASE EXCESS BLDV CALC-SCNC: 1.3 MMOL/L (ref -3–3)
BASE EXCESS BLDV CALC-SCNC: 3.1 MMOL/L (ref -3–3)
BASE EXCESS BLDV CALC-SCNC: 4.6 MMOL/L (ref -3–3)
BASE EXCESS BLDV CALC-SCNC: 4.7 MMOL/L (ref -3–3)
BASE EXCESS BLDV CALC-SCNC: 8.2 MMOL/L (ref -3–3)
BASOPHILS # BLD AUTO: 0 10E3/UL (ref 0–0.2)
BASOPHILS NFR BLD AUTO: 0 %
BILIRUB SERPL-MCNC: 0.3 MG/DL
BILIRUB SERPL-MCNC: 2.2 MG/DL
BLD PROD TYP BPU: NORMAL
BLOOD COMPONENT TYPE: NORMAL
BUN SERPL-MCNC: 25.4 MG/DL (ref 8–23)
BUN SERPL-MCNC: 28.8 MG/DL (ref 8–23)
BUN SERPL-MCNC: 29 MG/DL (ref 8–23)
BUN SERPL-MCNC: 38.3 MG/DL (ref 8–23)
CA-I BLD-MCNC: 4 MG/DL (ref 4.4–5.2)
CA-I BLD-MCNC: 4.1 MG/DL (ref 4.4–5.2)
CA-I BLD-MCNC: 4.3 MG/DL (ref 4.4–5.2)
CA-I BLD-MCNC: 4.4 MG/DL (ref 4.4–5.2)
CA-I BLD-MCNC: 4.5 MG/DL (ref 4.4–5.2)
CA-I BLD-MCNC: 4.6 MG/DL (ref 4.4–5.2)
CA-I BLD-MCNC: 4.7 MG/DL (ref 4.4–5.2)
CALCIUM SERPL-MCNC: 7.9 MG/DL (ref 8.8–10.4)
CALCIUM SERPL-MCNC: 8 MG/DL (ref 8.8–10.4)
CALCIUM SERPL-MCNC: 8.5 MG/DL (ref 8.8–10.4)
CALCIUM SERPL-MCNC: 8.8 MG/DL (ref 8.8–10.4)
CHLORIDE SERPL-SCNC: 91 MMOL/L (ref 98–107)
CHLORIDE SERPL-SCNC: 95 MMOL/L (ref 98–107)
CHLORIDE SERPL-SCNC: 97 MMOL/L (ref 98–107)
CHLORIDE SERPL-SCNC: 99 MMOL/L (ref 98–107)
CLOT INIT KAOL IND TO POST HEP NEUT TRTO: 1 {RATIO}
CLOT INIT KAOL IND TO POST HEP NEUT TRTO: 1.2 {RATIO}
CLOT INIT KAOL IND TO POST HEP NEUT TRTO: 1.2 {RATIO}
CLOT INIT KAOL IND TO POST HEP NEUT TRTO: ABNORMAL {RATIO}
CLOT INIT KAOLIN IND BLD US: 131 SEC (ref 113–166)
CLOT INIT KAOLIN IND BLD US: 135 SEC (ref 113–166)
CLOT INIT KAOLIN IND BLD US: 139 SEC (ref 113–166)
CLOT INIT KAOLIN IND BLD US: 158 SEC (ref 113–166)
CLOT INIT KAOLIN IND BLD US: 190 SEC (ref 113–166)
CLOT INIT KAOLIN IND BLD US: ABNORMAL S
CLOT INIT KAOLIN IND P HEP NEUT BLD US: 132 SEC (ref 103–153)
CLOT INIT KAOLIN IND P HEP NEUT BLD US: 135 SEC (ref 103–153)
CLOT INIT KAOLIN IND P HEP NEUT BLD US: 136 SEC (ref 103–153)
CLOT INIT KAOLIN IND P HEP NEUT BLD US: 140 SEC (ref 103–153)
CLOT INIT KAOLIN IND P HEP NEUT BLD US: 147 SEC (ref 103–153)
CLOT INIT KAOLIN IND P HEP NEUT BLD US: 159 SEC (ref 103–153)
CLOT STIFF PLT CONT BLD CALC: 14.5 HPA (ref 11.9–29.8)
CLOT STIFF PLT CONT BLD CALC: 15.2 HPA (ref 11.9–29.8)
CLOT STIFF PLT CONT BLD CALC: 15.7 HPA (ref 11.9–29.8)
CLOT STIFF PLT CONT BLD CALC: 19.3 HPA (ref 11.9–29.8)
CLOT STIFF PLT CONT BLD CALC: 19.9 HPA (ref 11.9–29.8)
CLOT STIFF PLT CONT BLD CALC: 27.7 HPA (ref 11.9–29.8)
CLOT STIFF TF IND P HEP NEUT BLD US: 18.1 HPA (ref 13–33.2)
CLOT STIFF TF IND P HEP NEUT BLD US: 18.6 HPA (ref 13–33.2)
CLOT STIFF TF IND P HEP NEUT BLD US: 19.2 HPA (ref 13–33.2)
CLOT STIFF TF IND P HEP NEUT BLD US: 23.3 HPA (ref 13–33.2)
CLOT STIFF TF IND P HEP NEUT BLD US: 24.9 HPA (ref 13–33.2)
CLOT STIFF TF IND P HEP NEUT BLD US: 34.6 HPA (ref 13–33.2)
CLOT STIFF TF IND+IIB-IIIA INH P HEP NEU: 2.9 HPA (ref 1–3.7)
CLOT STIFF TF IND+IIB-IIIA INH P HEP NEU: 3.5 HPA (ref 1–3.7)
CLOT STIFF TF IND+IIB-IIIA INH P HEP NEU: 4 HPA (ref 1–3.7)
CLOT STIFF TF IND+IIB-IIIA INH P HEP NEU: 4.1 HPA (ref 1–3.7)
CLOT STIFF TF IND+IIB-IIIA INH P HEP NEU: 5 HPA (ref 1–3.7)
CLOT STIFF TF IND+IIB-IIIA INH P HEP NEU: 6.9 HPA (ref 1–3.7)
CODING SYSTEM: NORMAL
COHGB MFR BLD: >100 % (ref 95–96)
CREAT SERPL-MCNC: 0.72 MG/DL (ref 0.67–1.17)
CREAT SERPL-MCNC: 0.75 MG/DL (ref 0.67–1.17)
CREAT SERPL-MCNC: 0.75 MG/DL (ref 0.67–1.17)
CREAT SERPL-MCNC: 0.96 MG/DL (ref 0.67–1.17)
CROSSMATCH: NORMAL
EGFRCR SERPLBLD CKD-EPI 2021: 83 ML/MIN/1.73M2
EGFRCR SERPLBLD CKD-EPI 2021: >90 ML/MIN/1.73M2
EOSINOPHIL # BLD AUTO: 0.1 10E3/UL (ref 0–0.7)
EOSINOPHIL NFR BLD AUTO: 1 %
ERYTHROCYTE [DISTWIDTH] IN BLOOD BY AUTOMATED COUNT: 15.9 % (ref 10–15)
ERYTHROCYTE [DISTWIDTH] IN BLOOD BY AUTOMATED COUNT: 17.5 % (ref 10–15)
ERYTHROCYTE [DISTWIDTH] IN BLOOD BY AUTOMATED COUNT: 17.8 % (ref 10–15)
ERYTHROCYTE [DISTWIDTH] IN BLOOD BY AUTOMATED COUNT: 17.9 % (ref 10–15)
EST. AVERAGE GLUCOSE BLD GHB EST-MCNC: 108 MG/DL
FIBRINOGEN PPP-MCNC: 286 MG/DL (ref 170–510)
FIBRINOGEN PPP-MCNC: 303 MG/DL (ref 170–510)
FIBRINOGEN PPP-MCNC: 318 MG/DL (ref 170–510)
FIBRINOGEN PPP-MCNC: 323 MG/DL (ref 170–510)
FIBRINOGEN PPP-MCNC: 331 MG/DL (ref 170–510)
GLUCOSE BLD-MCNC: 122 MG/DL (ref 70–99)
GLUCOSE BLD-MCNC: 133 MG/DL (ref 70–99)
GLUCOSE BLD-MCNC: 139 MG/DL (ref 70–99)
GLUCOSE BLD-MCNC: 140 MG/DL (ref 70–99)
GLUCOSE BLD-MCNC: 141 MG/DL (ref 70–99)
GLUCOSE BLD-MCNC: 145 MG/DL (ref 70–99)
GLUCOSE BLD-MCNC: 164 MG/DL (ref 70–99)
GLUCOSE BLD-MCNC: 174 MG/DL (ref 70–99)
GLUCOSE BLD-MCNC: 174 MG/DL (ref 70–99)
GLUCOSE BLD-MCNC: 187 MG/DL (ref 70–99)
GLUCOSE BLD-MCNC: 189 MG/DL (ref 70–99)
GLUCOSE BLDC GLUCOMTR-MCNC: 100 MG/DL (ref 70–99)
GLUCOSE BLDC GLUCOMTR-MCNC: 108 MG/DL (ref 70–99)
GLUCOSE BLDC GLUCOMTR-MCNC: 118 MG/DL (ref 70–99)
GLUCOSE BLDC GLUCOMTR-MCNC: 142 MG/DL (ref 70–99)
GLUCOSE BLDC GLUCOMTR-MCNC: 152 MG/DL (ref 70–99)
GLUCOSE BLDC GLUCOMTR-MCNC: 164 MG/DL (ref 70–99)
GLUCOSE BLDC GLUCOMTR-MCNC: 171 MG/DL (ref 70–99)
GLUCOSE BLDC GLUCOMTR-MCNC: 174 MG/DL (ref 70–99)
GLUCOSE BLDC GLUCOMTR-MCNC: 177 MG/DL (ref 70–99)
GLUCOSE BLDC GLUCOMTR-MCNC: 88 MG/DL (ref 70–99)
GLUCOSE BLDC GLUCOMTR-MCNC: 99 MG/DL (ref 70–99)
GLUCOSE SERPL-MCNC: 100 MG/DL (ref 70–99)
GLUCOSE SERPL-MCNC: 107 MG/DL (ref 70–99)
GLUCOSE SERPL-MCNC: 145 MG/DL (ref 70–99)
GLUCOSE SERPL-MCNC: 164 MG/DL (ref 70–99)
HBA1C MFR BLD: 5.4 %
HCO3 BLD-SCNC: 27 MMOL/L (ref 21–28)
HCO3 BLD-SCNC: 27 MMOL/L (ref 21–28)
HCO3 BLD-SCNC: 28 MMOL/L (ref 21–28)
HCO3 BLD-SCNC: 29 MMOL/L (ref 21–28)
HCO3 BLDA-SCNC: 26 MMOL/L (ref 21–28)
HCO3 BLDA-SCNC: 27 MMOL/L (ref 21–28)
HCO3 BLDA-SCNC: 28 MMOL/L (ref 21–28)
HCO3 BLDA-SCNC: 28 MMOL/L (ref 21–28)
HCO3 BLDA-SCNC: 29 MMOL/L (ref 21–28)
HCO3 BLDA-SCNC: 29 MMOL/L (ref 21–28)
HCO3 BLDA-SCNC: 30 MMOL/L (ref 21–28)
HCO3 BLDV-SCNC: 26 MMOL/L (ref 21–28)
HCO3 BLDV-SCNC: 27 MMOL/L (ref 21–28)
HCO3 BLDV-SCNC: 29 MMOL/L (ref 21–28)
HCO3 BLDV-SCNC: 30 MMOL/L (ref 21–28)
HCO3 BLDV-SCNC: 30 MMOL/L (ref 21–28)
HCO3 BLDV-SCNC: 33 MMOL/L (ref 21–28)
HCO3 SERPL-SCNC: 25 MMOL/L (ref 22–29)
HCO3 SERPL-SCNC: 28 MMOL/L (ref 22–29)
HCT VFR BLD AUTO: 22.9 % (ref 40–53)
HCT VFR BLD AUTO: 24.3 % (ref 40–53)
HCT VFR BLD AUTO: 26 % (ref 40–53)
HCT VFR BLD AUTO: 27 % (ref 40–53)
HGB BLD-MCNC: 10.1 G/DL (ref 13.3–17.7)
HGB BLD-MCNC: 6.8 G/DL (ref 13.3–17.7)
HGB BLD-MCNC: 7.7 G/DL (ref 13.3–17.7)
HGB BLD-MCNC: 7.9 G/DL (ref 13.3–17.7)
HGB BLD-MCNC: 8 G/DL (ref 13.3–17.7)
HGB BLD-MCNC: 8.1 G/DL (ref 13.3–17.7)
HGB BLD-MCNC: 8.5 G/DL (ref 13.3–17.7)
HGB BLD-MCNC: 8.7 G/DL (ref 13.3–17.7)
HGB BLD-MCNC: 8.8 G/DL (ref 13.3–17.7)
HGB BLD-MCNC: 8.9 G/DL (ref 13.3–17.7)
HGB BLD-MCNC: 9 G/DL (ref 13.3–17.7)
HGB BLD-MCNC: 9.4 G/DL (ref 13.3–17.7)
IMM GRANULOCYTES # BLD: 0.1 10E3/UL
IMM GRANULOCYTES NFR BLD: 1 %
INR PPP: 1.24 (ref 0.85–1.15)
INR PPP: 1.36 (ref 0.85–1.15)
INR PPP: 1.4 (ref 0.85–1.15)
INR PPP: 1.44 (ref 0.85–1.15)
INR PPP: 1.45 (ref 0.85–1.15)
INR PPP: 1.86 (ref 0.85–1.15)
ISSUE DATE AND TIME: NORMAL
LACTATE BLD-SCNC: 0.4 MMOL/L (ref 0.7–2)
LACTATE BLD-SCNC: 0.8 MMOL/L (ref 0.7–2)
LACTATE BLD-SCNC: 1.4 MMOL/L (ref 0.7–2)
LACTATE BLD-SCNC: 1.4 MMOL/L (ref 0.7–2)
LACTATE BLD-SCNC: 1.5 MMOL/L (ref 0.7–2)
LACTATE BLD-SCNC: 1.9 MMOL/L (ref 0.7–2)
LACTATE BLD-SCNC: 2 MMOL/L (ref 0.7–2)
LACTATE BLD-SCNC: 2.7 MMOL/L (ref 0.7–2)
LACTATE BLD-SCNC: 2.8 MMOL/L (ref 0.7–2)
LACTATE BLD-SCNC: 3 MMOL/L (ref 0.7–2)
LACTATE BLD-SCNC: 3.1 MMOL/L (ref 0.7–2)
LACTATE SERPL-SCNC: 0.8 MMOL/L (ref 0.7–2)
LACTATE SERPL-SCNC: 1.2 MMOL/L (ref 0.7–2)
LACTATE SERPL-SCNC: 2.3 MMOL/L (ref 0.7–2)
LACTATE SERPL-SCNC: 2.6 MMOL/L (ref 0.7–2)
LACTATE SERPL-SCNC: 2.9 MMOL/L (ref 0.7–2)
LYMPHOCYTES # BLD AUTO: 0.3 10E3/UL (ref 0.8–5.3)
LYMPHOCYTES NFR BLD AUTO: 3 %
MAGNESIUM SERPL-MCNC: 2.1 MG/DL (ref 1.7–2.3)
MAGNESIUM SERPL-MCNC: 2.3 MG/DL (ref 1.7–2.3)
MCH RBC QN AUTO: 30.8 PG (ref 26.5–33)
MCH RBC QN AUTO: 31.1 PG (ref 26.5–33)
MCH RBC QN AUTO: 31.4 PG (ref 26.5–33)
MCH RBC QN AUTO: 31.5 PG (ref 26.5–33)
MCHC RBC AUTO-ENTMCNC: 32.6 G/DL (ref 31.5–36.5)
MCHC RBC AUTO-ENTMCNC: 33.3 G/DL (ref 31.5–36.5)
MCHC RBC AUTO-ENTMCNC: 33.6 G/DL (ref 31.5–36.5)
MCHC RBC AUTO-ENTMCNC: 34.6 G/DL (ref 31.5–36.5)
MCV RBC AUTO: 89 FL (ref 78–100)
MCV RBC AUTO: 94 FL (ref 78–100)
MCV RBC AUTO: 95 FL (ref 78–100)
MCV RBC AUTO: 95 FL (ref 78–100)
MONOCYTES # BLD AUTO: 0.5 10E3/UL (ref 0–1.3)
MONOCYTES NFR BLD AUTO: 5 %
NEUTROPHILS # BLD AUTO: 8.6 10E3/UL (ref 1.6–8.3)
NEUTROPHILS NFR BLD AUTO: 90 %
NRBC # BLD AUTO: 0 10E3/UL
NRBC BLD AUTO-RTO: 0 /100
O2/TOTAL GAS SETTING VFR VENT: 100 %
O2/TOTAL GAS SETTING VFR VENT: 100 %
O2/TOTAL GAS SETTING VFR VENT: 21 %
O2/TOTAL GAS SETTING VFR VENT: 40 %
O2/TOTAL GAS SETTING VFR VENT: 40 %
O2/TOTAL GAS SETTING VFR VENT: 50 %
O2/TOTAL GAS SETTING VFR VENT: 6 %
O2/TOTAL GAS SETTING VFR VENT: 60 %
O2/TOTAL GAS SETTING VFR VENT: 70 %
O2/TOTAL GAS SETTING VFR VENT: 70 %
O2/TOTAL GAS SETTING VFR VENT: 80 %
O2/TOTAL GAS SETTING VFR VENT: 95 %
O2/TOTAL GAS SETTING VFR VENT: 95 %
OXYHGB MFR BLDA: 98 % (ref 92–100)
OXYHGB MFR BLDA: 99 % (ref 92–100)
OXYHGB MFR BLDV: 60 % (ref 70–75)
OXYHGB MFR BLDV: 66 % (ref 70–75)
OXYHGB MFR BLDV: 66 % (ref 70–75)
OXYHGB MFR BLDV: 75 % (ref 70–75)
OXYHGB MFR BLDV: 75 % (ref 70–75)
OXYHGB MFR BLDV: 77 % (ref 70–75)
PCO2 BLD: 36 MM HG (ref 35–45)
PCO2 BLD: 41 MM HG (ref 35–45)
PCO2 BLD: 44 MM HG (ref 35–45)
PCO2 BLD: 45 MM HG (ref 35–45)
PCO2 BLDA: 38 MM HG (ref 35–45)
PCO2 BLDA: 38 MM HG (ref 35–45)
PCO2 BLDA: 39 MM HG (ref 35–45)
PCO2 BLDA: 44 MM HG (ref 35–45)
PCO2 BLDA: 44 MM HG (ref 35–45)
PCO2 BLDA: 45 MM HG (ref 35–45)
PCO2 BLDA: 45 MM HG (ref 35–45)
PCO2 BLDA: 46 MM HG (ref 35–45)
PCO2 BLDA: 46 MM HG (ref 35–45)
PCO2 BLDA: 51 MM HG (ref 35–45)
PCO2 BLDV: 40 MM HG (ref 40–50)
PCO2 BLDV: 46 MM HG (ref 40–50)
PCO2 BLDV: 48 MM HG (ref 40–50)
PCO2 BLDV: 48 MM HG (ref 40–50)
PCO2 BLDV: 51 MM HG (ref 40–50)
PCO2 BLDV: 51 MM HG (ref 40–50)
PEEP: 5 CM H2O
PH BLD: 7.39 [PH] (ref 7.35–7.45)
PH BLD: 7.4 [PH] (ref 7.35–7.45)
PH BLD: 7.47 [PH] (ref 7.35–7.45)
PH BLD: 7.48 [PH] (ref 7.35–7.45)
PH BLDA: 7.38 [PH] (ref 7.35–7.45)
PH BLDA: 7.39 [PH] (ref 7.35–7.45)
PH BLDA: 7.39 [PH] (ref 7.35–7.45)
PH BLDA: 7.4 [PH] (ref 7.35–7.45)
PH BLDA: 7.4 [PH] (ref 7.35–7.45)
PH BLDA: 7.42 [PH] (ref 7.35–7.45)
PH BLDA: 7.42 [PH] (ref 7.35–7.45)
PH BLDA: 7.45 [PH] (ref 7.35–7.45)
PH BLDA: 7.46 [PH] (ref 7.35–7.45)
PH BLDA: 7.47 [PH] (ref 7.35–7.45)
PH BLDV: 7.36 [PH] (ref 7.32–7.43)
PH BLDV: 7.36 [PH] (ref 7.32–7.43)
PH BLDV: 7.38 [PH] (ref 7.32–7.43)
PH BLDV: 7.42 [PH] (ref 7.32–7.43)
PH BLDV: 7.42 [PH] (ref 7.32–7.43)
PH BLDV: 7.45 [PH] (ref 7.32–7.43)
PHOSPHATE SERPL-MCNC: 2.6 MG/DL (ref 2.5–4.5)
PLATELET # BLD AUTO: 100 10E3/UL (ref 150–450)
PLATELET # BLD AUTO: 110 10E3/UL (ref 150–450)
PLATELET # BLD AUTO: 129 10E3/UL (ref 150–450)
PLATELET # BLD AUTO: 130 10E3/UL (ref 150–450)
PLATELET # BLD AUTO: 220 10E3/UL (ref 150–450)
PO2 BLD: 153 MM HG (ref 80–105)
PO2 BLD: 238 MM HG (ref 80–105)
PO2 BLD: 253 MM HG (ref 80–105)
PO2 BLD: 380 MM HG (ref 80–105)
PO2 BLDA: 182 MM HG (ref 80–105)
PO2 BLDA: 279 MM HG (ref 80–105)
PO2 BLDA: 346 MM HG (ref 80–105)
PO2 BLDA: 355 MM HG (ref 80–105)
PO2 BLDA: 369 MM HG (ref 80–105)
PO2 BLDA: 378 MM HG (ref 80–105)
PO2 BLDA: 411 MM HG (ref 80–105)
PO2 BLDA: 510 MM HG (ref 80–105)
PO2 BLDA: 514 MM HG (ref 80–105)
PO2 BLDA: 530 MM HG (ref 80–105)
PO2 BLDV: 34 MM HG (ref 25–47)
PO2 BLDV: 36 MM HG (ref 25–47)
PO2 BLDV: 38 MM HG (ref 25–47)
PO2 BLDV: 38 MM HG (ref 25–47)
PO2 BLDV: 39 MM HG (ref 25–47)
PO2 BLDV: 43 MM HG (ref 25–47)
POTASSIUM BLD-SCNC: 3.6 MMOL/L (ref 3.4–5.3)
POTASSIUM BLD-SCNC: 3.9 MMOL/L (ref 3.4–5.3)
POTASSIUM BLD-SCNC: 4.1 MMOL/L (ref 3.4–5.3)
POTASSIUM BLD-SCNC: 4.2 MMOL/L (ref 3.4–5.3)
POTASSIUM BLD-SCNC: 4.3 MMOL/L (ref 3.4–5.3)
POTASSIUM BLD-SCNC: 4.3 MMOL/L (ref 3.4–5.3)
POTASSIUM BLD-SCNC: 4.4 MMOL/L (ref 3.4–5.3)
POTASSIUM BLD-SCNC: 4.4 MMOL/L (ref 3.4–5.3)
POTASSIUM BLD-SCNC: 4.5 MMOL/L (ref 3.4–5.3)
POTASSIUM SERPL-SCNC: 3.7 MMOL/L (ref 3.4–5.3)
POTASSIUM SERPL-SCNC: 4.1 MMOL/L (ref 3.4–5.3)
POTASSIUM SERPL-SCNC: 4.3 MMOL/L (ref 3.4–5.3)
POTASSIUM SERPL-SCNC: 4.7 MMOL/L (ref 3.4–5.3)
PROT SERPL-MCNC: 4.9 G/DL (ref 6.4–8.3)
PROT SERPL-MCNC: 6.2 G/DL (ref 6.4–8.3)
RBC # BLD AUTO: 2.45 10E6/UL (ref 4.4–5.9)
RBC # BLD AUTO: 2.57 10E6/UL (ref 4.4–5.9)
RBC # BLD AUTO: 2.83 10E6/UL (ref 4.4–5.9)
RBC # BLD AUTO: 2.92 10E6/UL (ref 4.4–5.9)
SAO2 % BLDA: 97 % (ref 92–100)
SAO2 % BLDA: 98 % (ref 92–100)
SAO2 % BLDA: >100 % (ref 95–96)
SAO2 % BLDV: 61.4 % (ref 70–75)
SAO2 % BLDV: 68 % (ref 70–75)
SAO2 % BLDV: 68.5 % (ref 70–75)
SAO2 % BLDV: 77.2 % (ref 70–75)
SAO2 % BLDV: 78 % (ref 70–75)
SAO2 % BLDV: 79.4 % (ref 70–75)
SODIUM BLD-SCNC: 129 MMOL/L (ref 135–145)
SODIUM BLD-SCNC: 130 MMOL/L (ref 135–145)
SODIUM BLD-SCNC: 131 MMOL/L (ref 135–145)
SODIUM BLD-SCNC: 131 MMOL/L (ref 135–145)
SODIUM BLD-SCNC: 132 MMOL/L (ref 135–145)
SODIUM BLD-SCNC: 133 MMOL/L (ref 135–145)
SODIUM SERPL-SCNC: 128 MMOL/L (ref 135–145)
SODIUM SERPL-SCNC: 131 MMOL/L (ref 135–145)
SODIUM SERPL-SCNC: 132 MMOL/L (ref 135–145)
SODIUM SERPL-SCNC: 133 MMOL/L (ref 135–145)
UFH PPP CHRO-ACNC: 0.16 IU/ML
UFH PPP CHRO-ACNC: 0.33 IU/ML
UNIT ABO/RH: NORMAL
UNIT NUMBER: NORMAL
UNIT STATUS: NORMAL
UNIT TYPE ISBT: 5100
UNIT TYPE ISBT: 6200
UNIT TYPE ISBT: 9500
WBC # BLD AUTO: 17.3 10E3/UL (ref 4–11)
WBC # BLD AUTO: 18.3 10E3/UL (ref 4–11)
WBC # BLD AUTO: 9.5 10E3/UL (ref 4–11)
WBC # BLD AUTO: 9.8 10E3/UL (ref 4–11)

## 2024-09-18 PROCEDURE — 80053 COMPREHEN METABOLIC PANEL: CPT

## 2024-09-18 PROCEDURE — P9047 ALBUMIN (HUMAN), 25%, 50ML: HCPCS

## 2024-09-18 PROCEDURE — 250N000009 HC RX 250: Performed by: SURGERY

## 2024-09-18 PROCEDURE — 250N000011 HC RX IP 250 OP 636: Performed by: SURGERY

## 2024-09-18 PROCEDURE — 83605 ASSAY OF LACTIC ACID: CPT | Performed by: SURGERY

## 2024-09-18 PROCEDURE — 250N000011 HC RX IP 250 OP 636: Mod: JZ

## 2024-09-18 PROCEDURE — 258N000003 HC RX IP 258 OP 636: Performed by: SURGERY

## 2024-09-18 PROCEDURE — 250N000011 HC RX IP 250 OP 636

## 2024-09-18 PROCEDURE — 250N000009 HC RX 250

## 2024-09-18 PROCEDURE — 85384 FIBRINOGEN ACTIVITY: CPT

## 2024-09-18 PROCEDURE — 82805 BLOOD GASES W/O2 SATURATION: CPT | Performed by: SURGERY

## 2024-09-18 PROCEDURE — 258N000003 HC RX IP 258 OP 636: Performed by: STUDENT IN AN ORGANIZED HEALTH CARE EDUCATION/TRAINING PROGRAM

## 2024-09-18 PROCEDURE — 360N000076 HC SURGERY LEVEL 3, PER MIN: Performed by: STUDENT IN AN ORGANIZED HEALTH CARE EDUCATION/TRAINING PROGRAM

## 2024-09-18 PROCEDURE — 272N000088 HC PUMP APP ADULT PERFUSION: Performed by: STUDENT IN AN ORGANIZED HEALTH CARE EDUCATION/TRAINING PROGRAM

## 2024-09-18 PROCEDURE — 85027 COMPLETE CBC AUTOMATED: CPT | Performed by: INTERNAL MEDICINE

## 2024-09-18 PROCEDURE — 33979 INSERT INTRACORPOREAL DEVICE: CPT | Performed by: STUDENT IN AN ORGANIZED HEALTH CARE EDUCATION/TRAINING PROGRAM

## 2024-09-18 PROCEDURE — 258N000003 HC RX IP 258 OP 636

## 2024-09-18 PROCEDURE — P9016 RBC LEUKOCYTES REDUCED: HCPCS

## 2024-09-18 PROCEDURE — 85610 PROTHROMBIN TIME: CPT | Performed by: INTERNAL MEDICINE

## 2024-09-18 PROCEDURE — 3E043XZ INTRODUCTION OF VASOPRESSOR INTO CENTRAL VEIN, PERCUTANEOUS APPROACH: ICD-10-PCS | Performed by: STUDENT IN AN ORGANIZED HEALTH CARE EDUCATION/TRAINING PROGRAM

## 2024-09-18 PROCEDURE — 85730 THROMBOPLASTIN TIME PARTIAL: CPT

## 2024-09-18 PROCEDURE — 85610 PROTHROMBIN TIME: CPT

## 2024-09-18 PROCEDURE — 85384 FIBRINOGEN ACTIVITY: CPT | Performed by: STUDENT IN AN ORGANIZED HEALTH CARE EDUCATION/TRAINING PROGRAM

## 2024-09-18 PROCEDURE — 85014 HEMATOCRIT: CPT | Performed by: STUDENT IN AN ORGANIZED HEALTH CARE EDUCATION/TRAINING PROGRAM

## 2024-09-18 PROCEDURE — 99100 ANES PT EXTEME AGE<1 YR&>70: CPT | Performed by: ANESTHESIOLOGY

## 2024-09-18 PROCEDURE — 33979 INSERT INTRACORPOREAL DEVICE: CPT

## 2024-09-18 PROCEDURE — 360N000079 HC SURGERY LEVEL 6, PER MIN: Performed by: STUDENT IN AN ORGANIZED HEALTH CARE EDUCATION/TRAINING PROGRAM

## 2024-09-18 PROCEDURE — 99291 CRITICAL CARE FIRST HOUR: CPT | Mod: 25 | Performed by: INTERNAL MEDICINE

## 2024-09-18 PROCEDURE — 33979 INSERT INTRACORPOREAL DEVICE: CPT | Mod: GC | Performed by: STUDENT IN AN ORGANIZED HEALTH CARE EDUCATION/TRAINING PROGRAM

## 2024-09-18 PROCEDURE — 250N000024 HC ISOFLURANE, PER MIN: Performed by: STUDENT IN AN ORGANIZED HEALTH CARE EDUCATION/TRAINING PROGRAM

## 2024-09-18 PROCEDURE — 250N000009 HC RX 250: Performed by: STUDENT IN AN ORGANIZED HEALTH CARE EDUCATION/TRAINING PROGRAM

## 2024-09-18 PROCEDURE — 250N000011 HC RX IP 250 OP 636: Performed by: STUDENT IN AN ORGANIZED HEALTH CARE EDUCATION/TRAINING PROGRAM

## 2024-09-18 PROCEDURE — 258N000003 HC RX IP 258 OP 636: Performed by: INTERNAL MEDICINE

## 2024-09-18 PROCEDURE — 84100 ASSAY OF PHOSPHORUS: CPT | Performed by: SURGERY

## 2024-09-18 PROCEDURE — 71045 X-RAY EXAM CHEST 1 VIEW: CPT | Mod: 26 | Performed by: RADIOLOGY

## 2024-09-18 PROCEDURE — 33979 INSERT INTRACORPOREAL DEVICE: CPT | Performed by: ANESTHESIOLOGY

## 2024-09-18 PROCEDURE — 5A1221Z PERFORMANCE OF CARDIAC OUTPUT, CONTINUOUS: ICD-10-PCS | Performed by: STUDENT IN AN ORGANIZED HEALTH CARE EDUCATION/TRAINING PROGRAM

## 2024-09-18 PROCEDURE — 35820 EXPLORE CHEST VESSELS: CPT | Mod: 78 | Performed by: STUDENT IN AN ORGANIZED HEALTH CARE EDUCATION/TRAINING PROGRAM

## 2024-09-18 PROCEDURE — 82330 ASSAY OF CALCIUM: CPT | Performed by: SURGERY

## 2024-09-18 PROCEDURE — 94799 UNLISTED PULMONARY SVC/PX: CPT

## 2024-09-18 PROCEDURE — 85384 FIBRINOGEN ACTIVITY: CPT | Performed by: NURSE PRACTITIONER

## 2024-09-18 PROCEDURE — 85730 THROMBOPLASTIN TIME PARTIAL: CPT | Performed by: SURGERY

## 2024-09-18 PROCEDURE — B24BZZ4 ULTRASONOGRAPHY OF HEART WITH AORTA, TRANSESOPHAGEAL: ICD-10-PCS | Performed by: STUDENT IN AN ORGANIZED HEALTH CARE EDUCATION/TRAINING PROGRAM

## 2024-09-18 PROCEDURE — 999N000065 XR ABDOMEN PORT 1 VIEW

## 2024-09-18 PROCEDURE — 272N000001 HC OR GENERAL SUPPLY STERILE: Performed by: STUDENT IN AN ORGANIZED HEALTH CARE EDUCATION/TRAINING PROGRAM

## 2024-09-18 PROCEDURE — 99100 ANES PT EXTEME AGE<1 YR&>70: CPT

## 2024-09-18 PROCEDURE — 85610 PROTHROMBIN TIME: CPT | Performed by: SURGERY

## 2024-09-18 PROCEDURE — 93005 ELECTROCARDIOGRAM TRACING: CPT

## 2024-09-18 PROCEDURE — 71045 X-RAY EXAM CHEST 1 VIEW: CPT | Mod: 26 | Performed by: STUDENT IN AN ORGANIZED HEALTH CARE EDUCATION/TRAINING PROGRAM

## 2024-09-18 PROCEDURE — 88307 TISSUE EXAM BY PATHOLOGIST: CPT | Mod: TC | Performed by: STUDENT IN AN ORGANIZED HEALTH CARE EDUCATION/TRAINING PROGRAM

## 2024-09-18 PROCEDURE — 274N000002 HC DEVICE HM 14-V LITH BATTERY CLIP, INITIAL ONLY

## 2024-09-18 PROCEDURE — 83605 ASSAY OF LACTIC ACID: CPT | Performed by: STUDENT IN AN ORGANIZED HEALTH CARE EDUCATION/TRAINING PROGRAM

## 2024-09-18 PROCEDURE — 82805 BLOOD GASES W/O2 SATURATION: CPT | Performed by: STUDENT IN AN ORGANIZED HEALTH CARE EDUCATION/TRAINING PROGRAM

## 2024-09-18 PROCEDURE — 74018 RADEX ABDOMEN 1 VIEW: CPT | Mod: 26 | Performed by: RADIOLOGY

## 2024-09-18 PROCEDURE — 99100 ANES PT EXTEME AGE<1 YR&>70: CPT | Performed by: STUDENT IN AN ORGANIZED HEALTH CARE EDUCATION/TRAINING PROGRAM

## 2024-09-18 PROCEDURE — 83735 ASSAY OF MAGNESIUM: CPT

## 2024-09-18 PROCEDURE — P9037 PLATE PHERES LEUKOREDU IRRAD: HCPCS

## 2024-09-18 PROCEDURE — 85025 COMPLETE CBC W/AUTO DIFF WBC: CPT

## 2024-09-18 PROCEDURE — 250N000009 HC RX 250: Performed by: INTERNAL MEDICINE

## 2024-09-18 PROCEDURE — 999N000065 XR CHEST PORT 1 VIEW

## 2024-09-18 PROCEDURE — 85520 HEPARIN ASSAY: CPT | Performed by: INTERNAL MEDICINE

## 2024-09-18 PROCEDURE — 93750 INTERROGATION VAD IN PERSON: CPT | Mod: GC | Performed by: INTERNAL MEDICINE

## 2024-09-18 PROCEDURE — 85014 HEMATOCRIT: CPT | Performed by: NURSE PRACTITIONER

## 2024-09-18 PROCEDURE — 85610 PROTHROMBIN TIME: CPT | Performed by: NURSE PRACTITIONER

## 2024-09-18 PROCEDURE — 410N000004: Performed by: STUDENT IN AN ORGANIZED HEALTH CARE EDUCATION/TRAINING PROGRAM

## 2024-09-18 PROCEDURE — C1760 CLOSURE DEV, VASC: HCPCS | Performed by: STUDENT IN AN ORGANIZED HEALTH CARE EDUCATION/TRAINING PROGRAM

## 2024-09-18 PROCEDURE — 85396 CLOTTING ASSAY WHOLE BLOOD: CPT

## 2024-09-18 PROCEDURE — 82330 ASSAY OF CALCIUM: CPT

## 2024-09-18 PROCEDURE — 85384 FIBRINOGEN ACTIVITY: CPT | Performed by: INTERNAL MEDICINE

## 2024-09-18 PROCEDURE — 85027 COMPLETE CBC AUTOMATED: CPT | Performed by: SURGERY

## 2024-09-18 PROCEDURE — 0W380ZZ CONTROL BLEEDING IN CHEST WALL, OPEN APPROACH: ICD-10-PCS | Performed by: STUDENT IN AN ORGANIZED HEALTH CARE EDUCATION/TRAINING PROGRAM

## 2024-09-18 PROCEDURE — 94002 VENT MGMT INPAT INIT DAY: CPT

## 2024-09-18 PROCEDURE — 250N000011 HC RX IP 250 OP 636: Mod: JZ | Performed by: STUDENT IN AN ORGANIZED HEALTH CARE EDUCATION/TRAINING PROGRAM

## 2024-09-18 PROCEDURE — 83735 ASSAY OF MAGNESIUM: CPT | Performed by: SURGERY

## 2024-09-18 PROCEDURE — 99100 ANES PT EXTEME AGE<1 YR&>70: CPT | Performed by: NURSE ANESTHETIST, CERTIFIED REGISTERED

## 2024-09-18 PROCEDURE — 82805 BLOOD GASES W/O2 SATURATION: CPT

## 2024-09-18 PROCEDURE — 258N000003 HC RX IP 258 OP 636: Performed by: NURSE PRACTITIONER

## 2024-09-18 PROCEDURE — 83605 ASSAY OF LACTIC ACID: CPT

## 2024-09-18 PROCEDURE — 274N000010 HC DEVICE HM III CONTROLLER, INITIAL ONLY, EACH

## 2024-09-18 PROCEDURE — C1894 INTRO/SHEATH, NON-LASER: HCPCS | Performed by: STUDENT IN AN ORGANIZED HEALTH CARE EDUCATION/TRAINING PROGRAM

## 2024-09-18 PROCEDURE — 274N000006 HC DEVICE HM POCKET SHOWER BAG, INITIAL ONLY, EACH

## 2024-09-18 PROCEDURE — 33979 INSERT INTRACORPOREAL DEVICE: CPT | Performed by: NURSE ANESTHETIST, CERTIFIED REGISTERED

## 2024-09-18 PROCEDURE — 250N000011 HC RX IP 250 OP 636: Performed by: NURSE ANESTHETIST, CERTIFIED REGISTERED

## 2024-09-18 PROCEDURE — 999N000141 HC STATISTIC PRE-PROCEDURE NURSING ASSESSMENT: Performed by: STUDENT IN AN ORGANIZED HEALTH CARE EDUCATION/TRAINING PROGRAM

## 2024-09-18 PROCEDURE — P9016 RBC LEUKOCYTES REDUCED: HCPCS | Performed by: NURSE PRACTITIONER

## 2024-09-18 PROCEDURE — 999N000128 HC STATISTIC PERIPHERAL IV START W/O US GUIDANCE

## 2024-09-18 PROCEDURE — P9037 PLATE PHERES LEUKOREDU IRRAD: HCPCS | Performed by: NURSE PRACTITIONER

## 2024-09-18 PROCEDURE — 999N000157 HC STATISTIC RCP TIME EA 10 MIN

## 2024-09-18 PROCEDURE — 88307 TISSUE EXAM BY PATHOLOGIST: CPT | Mod: 26 | Performed by: PATHOLOGY

## 2024-09-18 PROCEDURE — 99292 CRITICAL CARE ADDL 30 MIN: CPT | Mod: 25 | Performed by: INTERNAL MEDICINE

## 2024-09-18 PROCEDURE — 84132 ASSAY OF SERUM POTASSIUM: CPT

## 2024-09-18 PROCEDURE — P9059 PLASMA, FRZ BETWEEN 8-24HOUR: HCPCS | Performed by: NURSE PRACTITIONER

## 2024-09-18 PROCEDURE — 84100 ASSAY OF PHOSPHORUS: CPT | Performed by: INTERNAL MEDICINE

## 2024-09-18 PROCEDURE — 83036 HEMOGLOBIN GLYCOSYLATED A1C: CPT

## 2024-09-18 PROCEDURE — 250N000013 HC RX MED GY IP 250 OP 250 PS 637: Performed by: SURGERY

## 2024-09-18 PROCEDURE — 250N000009 HC RX 250: Performed by: NURSE ANESTHETIST, CERTIFIED REGISTERED

## 2024-09-18 PROCEDURE — 82805 BLOOD GASES W/O2 SATURATION: CPT | Performed by: INTERNAL MEDICINE

## 2024-09-18 PROCEDURE — 272N000085 HC PACK CELL SAVER CSP: Performed by: STUDENT IN AN ORGANIZED HEALTH CARE EDUCATION/TRAINING PROGRAM

## 2024-09-18 PROCEDURE — 274N000011 HC DEVICE HM III IMPLANT KIT

## 2024-09-18 PROCEDURE — 370N000017 HC ANESTHESIA TECHNICAL FEE, PER MIN: Performed by: STUDENT IN AN ORGANIZED HEALTH CARE EDUCATION/TRAINING PROGRAM

## 2024-09-18 PROCEDURE — 71045 X-RAY EXAM CHEST 1 VIEW: CPT

## 2024-09-18 PROCEDURE — 410N000003 HC PER-PERFUSION 1ST 30 MIN: Performed by: STUDENT IN AN ORGANIZED HEALTH CARE EDUCATION/TRAINING PROGRAM

## 2024-09-18 PROCEDURE — 200N000002 HC R&B ICU UMMC

## 2024-09-18 PROCEDURE — 250N000011 HC RX IP 250 OP 636: Performed by: INTERNAL MEDICINE

## 2024-09-18 PROCEDURE — 02HA0QZ INSERTION OF IMPLANTABLE HEART ASSIST SYSTEM INTO HEART, OPEN APPROACH: ICD-10-PCS | Performed by: STUDENT IN AN ORGANIZED HEALTH CARE EDUCATION/TRAINING PROGRAM

## 2024-09-18 PROCEDURE — 85730 THROMBOPLASTIN TIME PARTIAL: CPT | Performed by: NURSE PRACTITIONER

## 2024-09-18 PROCEDURE — 274N000003 HC DEVICE HM 14-V LITHIUM BATTERY, INITIAL ONLY, EACH

## 2024-09-18 PROCEDURE — 85730 THROMBOPLASTIN TIME PARTIAL: CPT | Performed by: INTERNAL MEDICINE

## 2024-09-18 PROCEDURE — 999N000185 HC STATISTIC TRANSPORT TIME EA 15 MIN

## 2024-09-18 PROCEDURE — 84155 ASSAY OF PROTEIN SERUM: CPT | Performed by: SURGERY

## 2024-09-18 RX ORDER — NOREPINEPHRINE BITARTRATE 0.06 MG/ML
.01-.15 INJECTION, SOLUTION INTRAVENOUS CONTINUOUS
Status: DISCONTINUED | OUTPATIENT
Start: 2024-09-18 | End: 2024-09-20

## 2024-09-18 RX ORDER — PROPOFOL 10 MG/ML
5-75 INJECTION, EMULSION INTRAVENOUS CONTINUOUS
Status: DISCONTINUED | OUTPATIENT
Start: 2024-09-18 | End: 2024-09-20

## 2024-09-18 RX ORDER — HYDRALAZINE HYDROCHLORIDE 20 MG/ML
10 INJECTION INTRAMUSCULAR; INTRAVENOUS EVERY 30 MIN PRN
Status: DISCONTINUED | OUTPATIENT
Start: 2024-09-18 | End: 2024-09-25

## 2024-09-18 RX ORDER — OXYCODONE HYDROCHLORIDE 10 MG/1
10 TABLET ORAL EVERY 4 HOURS PRN
Status: DISCONTINUED | OUTPATIENT
Start: 2024-09-18 | End: 2024-09-24

## 2024-09-18 RX ORDER — BISACODYL 10 MG
10 SUPPOSITORY, RECTAL RECTAL DAILY PRN
Status: DISCONTINUED | OUTPATIENT
Start: 2024-09-21 | End: 2024-10-05 | Stop reason: HOSPADM

## 2024-09-18 RX ORDER — ONDANSETRON 4 MG/1
4 TABLET, ORALLY DISINTEGRATING ORAL EVERY 6 HOURS PRN
Status: DISCONTINUED | OUTPATIENT
Start: 2024-09-18 | End: 2024-10-05 | Stop reason: HOSPADM

## 2024-09-18 RX ORDER — AMOXICILLIN 250 MG
1 CAPSULE ORAL 2 TIMES DAILY
Status: DISCONTINUED | OUTPATIENT
Start: 2024-09-18 | End: 2024-09-19

## 2024-09-18 RX ORDER — VANCOMYCIN HYDROCHLORIDE 1 G/200ML
1000 INJECTION, SOLUTION INTRAVENOUS EVERY 12 HOURS
Status: COMPLETED | OUTPATIENT
Start: 2024-09-18 | End: 2024-09-20

## 2024-09-18 RX ORDER — LEVOFLOXACIN 5 MG/ML
500 INJECTION, SOLUTION INTRAVENOUS
Status: DISCONTINUED | OUTPATIENT
Start: 2024-09-18 | End: 2024-09-18 | Stop reason: HOSPADM

## 2024-09-18 RX ORDER — FENTANYL CITRATE 50 UG/ML
INJECTION, SOLUTION INTRAMUSCULAR; INTRAVENOUS PRN
Status: DISCONTINUED | OUTPATIENT
Start: 2024-09-18 | End: 2024-09-18

## 2024-09-18 RX ORDER — LEVOFLOXACIN 5 MG/ML
500 INJECTION, SOLUTION INTRAVENOUS SEE ADMIN INSTRUCTIONS
Status: DISCONTINUED | OUTPATIENT
Start: 2024-09-18 | End: 2024-09-18 | Stop reason: HOSPADM

## 2024-09-18 RX ORDER — LIDOCAINE HYDROCHLORIDE 20 MG/ML
INJECTION, SOLUTION INFILTRATION; PERINEURAL PRN
Status: DISCONTINUED | OUTPATIENT
Start: 2024-09-18 | End: 2024-09-18

## 2024-09-18 RX ORDER — NALOXONE HYDROCHLORIDE 0.4 MG/ML
0.4 INJECTION, SOLUTION INTRAMUSCULAR; INTRAVENOUS; SUBCUTANEOUS
Status: DISCONTINUED | OUTPATIENT
Start: 2024-09-18 | End: 2024-10-05 | Stop reason: HOSPADM

## 2024-09-18 RX ORDER — MUPIROCIN 20 MG/G
OINTMENT TOPICAL 2 TIMES DAILY
Status: DISCONTINUED | OUTPATIENT
Start: 2024-09-18 | End: 2024-09-18 | Stop reason: HOSPADM

## 2024-09-18 RX ORDER — LEVOFLOXACIN 5 MG/ML
500 INJECTION, SOLUTION INTRAVENOUS EVERY 24 HOURS
Status: COMPLETED | OUTPATIENT
Start: 2024-09-19 | End: 2024-09-20

## 2024-09-18 RX ORDER — CALCIUM GLUCONATE 20 MG/ML
2 INJECTION, SOLUTION INTRAVENOUS EVERY 6 HOURS PRN
Status: ACTIVE | OUTPATIENT
Start: 2024-09-18 | End: 2024-09-24

## 2024-09-18 RX ORDER — PROPOFOL 10 MG/ML
INJECTION, EMULSION INTRAVENOUS PRN
Status: DISCONTINUED | OUTPATIENT
Start: 2024-09-18 | End: 2024-09-18

## 2024-09-18 RX ORDER — ONDANSETRON 2 MG/ML
4 INJECTION INTRAMUSCULAR; INTRAVENOUS EVERY 6 HOURS PRN
Status: DISCONTINUED | OUTPATIENT
Start: 2024-09-18 | End: 2024-10-03

## 2024-09-18 RX ORDER — GABAPENTIN 100 MG/1
100 CAPSULE ORAL AT BEDTIME
Status: DISCONTINUED | OUTPATIENT
Start: 2024-09-18 | End: 2024-09-18

## 2024-09-18 RX ORDER — PANTOPRAZOLE SODIUM 40 MG/1
40 TABLET, DELAYED RELEASE ORAL DAILY
Status: DISCONTINUED | OUTPATIENT
Start: 2024-09-18 | End: 2024-09-21

## 2024-09-18 RX ORDER — NALOXONE HYDROCHLORIDE 0.4 MG/ML
0.2 INJECTION, SOLUTION INTRAMUSCULAR; INTRAVENOUS; SUBCUTANEOUS
Status: DISCONTINUED | OUTPATIENT
Start: 2024-09-18 | End: 2024-10-05 | Stop reason: HOSPADM

## 2024-09-18 RX ORDER — CALCIUM CHLORIDE 100 MG/ML
INJECTION INTRAVENOUS; INTRAVENTRICULAR PRN
Status: DISCONTINUED | OUTPATIENT
Start: 2024-09-18 | End: 2024-09-18

## 2024-09-18 RX ORDER — METHOCARBAMOL 500 MG/1
500 TABLET, FILM COATED ORAL EVERY 6 HOURS PRN
Status: DISCONTINUED | OUTPATIENT
Start: 2024-09-18 | End: 2024-09-21

## 2024-09-18 RX ORDER — DEXTROSE MONOHYDRATE 100 MG/ML
INJECTION, SOLUTION INTRAVENOUS CONTINUOUS PRN
Status: DISCONTINUED | OUTPATIENT
Start: 2024-09-18 | End: 2024-09-21

## 2024-09-18 RX ORDER — POLYETHYLENE GLYCOL 3350 17 G/17G
17 POWDER, FOR SOLUTION ORAL DAILY
Status: DISCONTINUED | OUTPATIENT
Start: 2024-09-19 | End: 2024-09-19

## 2024-09-18 RX ORDER — FIBRINOGEN (HUMAN) 700-1300MG
1150 KIT INTRAVENOUS
Status: DISCONTINUED | OUTPATIENT
Start: 2024-09-18 | End: 2024-09-18

## 2024-09-18 RX ORDER — FLUCONAZOLE 2 MG/ML
200 INJECTION, SOLUTION INTRAVENOUS
Status: COMPLETED | OUTPATIENT
Start: 2024-09-18 | End: 2024-09-18

## 2024-09-18 RX ORDER — EPINEPHRINE IN 0.9 % SOD CHLOR 5 MG/250ML
.01-.1 PLASTIC BAG, INJECTION (ML) INTRAVENOUS CONTINUOUS
Status: DISCONTINUED | OUTPATIENT
Start: 2024-09-18 | End: 2024-09-22

## 2024-09-18 RX ORDER — OXYCODONE HYDROCHLORIDE 5 MG/1
5 TABLET ORAL EVERY 4 HOURS PRN
Status: DISCONTINUED | OUTPATIENT
Start: 2024-09-18 | End: 2024-10-05 | Stop reason: HOSPADM

## 2024-09-18 RX ORDER — HYDRALAZINE HYDROCHLORIDE 20 MG/ML
10 INJECTION INTRAMUSCULAR; INTRAVENOUS EVERY 30 MIN PRN
Status: DISCONTINUED | OUTPATIENT
Start: 2024-09-18 | End: 2024-09-18

## 2024-09-18 RX ORDER — PROPOFOL 10 MG/ML
INJECTION, EMULSION INTRAVENOUS CONTINUOUS PRN
Status: DISCONTINUED | OUTPATIENT
Start: 2024-09-18 | End: 2024-09-18

## 2024-09-18 RX ORDER — ASPIRIN 81 MG/1
81 TABLET, CHEWABLE ORAL DAILY
Status: DISCONTINUED | OUTPATIENT
Start: 2024-09-19 | End: 2024-09-23

## 2024-09-18 RX ORDER — DEXTROSE MONOHYDRATE 25 G/50ML
25-50 INJECTION, SOLUTION INTRAVENOUS
Status: DISCONTINUED | OUTPATIENT
Start: 2024-09-18 | End: 2024-09-21

## 2024-09-18 RX ORDER — DOBUTAMINE HYDROCHLORIDE 200 MG/100ML
2.5 INJECTION INTRAVENOUS CONTINUOUS
Status: DISCONTINUED | OUTPATIENT
Start: 2024-09-18 | End: 2024-09-20

## 2024-09-18 RX ORDER — LIDOCAINE 40 MG/G
CREAM TOPICAL
Status: DISCONTINUED | OUTPATIENT
Start: 2024-09-18 | End: 2024-10-05 | Stop reason: HOSPADM

## 2024-09-18 RX ORDER — LIDOCAINE HYDROCHLORIDE 10 MG/ML
INJECTION, SOLUTION INFILTRATION; PERINEURAL
Status: COMPLETED | OUTPATIENT
Start: 2024-09-18 | End: 2024-09-18

## 2024-09-18 RX ORDER — SODIUM CHLORIDE, SODIUM GLUCONATE, SODIUM ACETATE, POTASSIUM CHLORIDE AND MAGNESIUM CHLORIDE 526; 502; 368; 37; 30 MG/100ML; MG/100ML; MG/100ML; MG/100ML; MG/100ML
INJECTION, SOLUTION INTRAVENOUS CONTINUOUS PRN
Status: DISCONTINUED | OUTPATIENT
Start: 2024-09-18 | End: 2024-09-18

## 2024-09-18 RX ORDER — NICOTINE POLACRILEX 4 MG
15-30 LOZENGE BUCCAL
Status: DISCONTINUED | OUTPATIENT
Start: 2024-09-18 | End: 2024-09-21

## 2024-09-18 RX ORDER — ACETAMINOPHEN 325 MG/1
975 TABLET ORAL EVERY 8 HOURS
Status: DISCONTINUED | OUTPATIENT
Start: 2024-09-18 | End: 2024-09-19

## 2024-09-18 RX ORDER — PROCHLORPERAZINE MALEATE 5 MG
5 TABLET ORAL EVERY 6 HOURS PRN
Status: DISCONTINUED | OUTPATIENT
Start: 2024-09-18 | End: 2024-09-25

## 2024-09-18 RX ORDER — PHENYLEPHRINE HCL IN 0.9% NACL 50MG/250ML
.1-6 PLASTIC BAG, INJECTION (ML) INTRAVENOUS CONTINUOUS
Status: DISCONTINUED | OUTPATIENT
Start: 2024-09-18 | End: 2024-09-18 | Stop reason: HOSPADM

## 2024-09-18 RX ORDER — VANCOMYCIN HYDROCHLORIDE 1 G/20ML
INJECTION, POWDER, LYOPHILIZED, FOR SOLUTION INTRAVENOUS PRN
Status: DISCONTINUED | OUTPATIENT
Start: 2024-09-18 | End: 2024-09-18 | Stop reason: HOSPADM

## 2024-09-18 RX ORDER — CALCIUM GLUCONATE 20 MG/ML
1 INJECTION, SOLUTION INTRAVENOUS EVERY 6 HOURS PRN
Status: DISPENSED | OUTPATIENT
Start: 2024-09-18 | End: 2024-09-24

## 2024-09-18 RX ORDER — HEPARIN SODIUM 5000 [USP'U]/.5ML
5000 INJECTION, SOLUTION INTRAVENOUS; SUBCUTANEOUS EVERY 8 HOURS
Status: DISCONTINUED | OUTPATIENT
Start: 2024-09-19 | End: 2024-09-20

## 2024-09-18 RX ORDER — HYDROMORPHONE HYDROCHLORIDE 1 MG/ML
0.4 INJECTION, SOLUTION INTRAMUSCULAR; INTRAVENOUS; SUBCUTANEOUS
Status: DISCONTINUED | OUTPATIENT
Start: 2024-09-18 | End: 2024-09-21

## 2024-09-18 RX ORDER — HEPARIN SODIUM 1000 [USP'U]/ML
INJECTION, SOLUTION INTRAVENOUS; SUBCUTANEOUS PRN
Status: DISCONTINUED | OUTPATIENT
Start: 2024-09-18 | End: 2024-09-18

## 2024-09-18 RX ORDER — NOREPINEPHRINE BITARTRATE 0.06 MG/ML
.01-.1 INJECTION, SOLUTION INTRAVENOUS CONTINUOUS
Status: DISCONTINUED | OUTPATIENT
Start: 2024-09-18 | End: 2024-09-18 | Stop reason: HOSPADM

## 2024-09-18 RX ORDER — SODIUM CHLORIDE, SODIUM LACTATE, POTASSIUM CHLORIDE, CALCIUM CHLORIDE 600; 310; 30; 20 MG/100ML; MG/100ML; MG/100ML; MG/100ML
INJECTION, SOLUTION INTRAVENOUS CONTINUOUS PRN
Status: DISCONTINUED | OUTPATIENT
Start: 2024-09-18 | End: 2024-09-18

## 2024-09-18 RX ORDER — DEXMEDETOMIDINE HYDROCHLORIDE 4 UG/ML
.2-.7 INJECTION, SOLUTION INTRAVENOUS CONTINUOUS
Status: DISCONTINUED | OUTPATIENT
Start: 2024-09-18 | End: 2024-09-18

## 2024-09-18 RX ORDER — HYDROMORPHONE HYDROCHLORIDE 1 MG/ML
0.2 INJECTION, SOLUTION INTRAMUSCULAR; INTRAVENOUS; SUBCUTANEOUS
Status: DISCONTINUED | OUTPATIENT
Start: 2024-09-18 | End: 2024-09-21

## 2024-09-18 RX ORDER — EPINEPHRINE IN 0.9 % SOD CHLOR 5 MG/250ML
.01-.3 PLASTIC BAG, INJECTION (ML) INTRAVENOUS CONTINUOUS
Status: DISCONTINUED | OUTPATIENT
Start: 2024-09-18 | End: 2024-09-18 | Stop reason: HOSPADM

## 2024-09-18 RX ORDER — VASOPRESSIN IN 0.9 % NACL 2 UNIT/2ML
SYRINGE (ML) INTRAVENOUS PRN
Status: DISCONTINUED | OUTPATIENT
Start: 2024-09-18 | End: 2024-09-18

## 2024-09-18 RX ORDER — DEXMEDETOMIDINE HYDROCHLORIDE 4 UG/ML
.1-1.2 INJECTION, SOLUTION INTRAVENOUS CONTINUOUS
Status: DISCONTINUED | OUTPATIENT
Start: 2024-09-18 | End: 2024-09-18 | Stop reason: HOSPADM

## 2024-09-18 RX ORDER — DOBUTAMINE HYDROCHLORIDE 200 MG/100ML
2.5-1 INJECTION INTRAVENOUS CONTINUOUS PRN
Status: DISCONTINUED | OUTPATIENT
Start: 2024-09-18 | End: 2024-09-18

## 2024-09-18 RX ORDER — ACETAMINOPHEN 325 MG/1
650 TABLET ORAL EVERY 4 HOURS PRN
Status: DISCONTINUED | OUTPATIENT
Start: 2024-09-21 | End: 2024-10-05 | Stop reason: HOSPADM

## 2024-09-18 RX ORDER — PROTAMINE SULFATE 10 MG/ML
INJECTION, SOLUTION INTRAVENOUS PRN
Status: DISCONTINUED | OUTPATIENT
Start: 2024-09-18 | End: 2024-09-18

## 2024-09-18 RX ORDER — POTASSIUM CHLORIDE 29.8 MG/ML
20 INJECTION INTRAVENOUS ONCE
Status: COMPLETED | OUTPATIENT
Start: 2024-09-18 | End: 2024-09-19

## 2024-09-18 RX ORDER — FLUCONAZOLE 2 MG/ML
200 INJECTION, SOLUTION INTRAVENOUS EVERY 24 HOURS
Status: COMPLETED | OUTPATIENT
Start: 2024-09-19 | End: 2024-09-20

## 2024-09-18 RX ORDER — VANCOMYCIN HYDROCHLORIDE 1 G/200ML
1000 INJECTION, SOLUTION INTRAVENOUS
Status: COMPLETED | OUTPATIENT
Start: 2024-09-18 | End: 2024-09-18

## 2024-09-18 RX ADMIN — FENTANYL CITRATE 250 MCG: 50 INJECTION INTRAMUSCULAR; INTRAVENOUS at 11:22

## 2024-09-18 RX ADMIN — FENTANYL CITRATE 50 MCG: 50 INJECTION INTRAMUSCULAR; INTRAVENOUS at 08:47

## 2024-09-18 RX ADMIN — Medication 50 MG: at 10:25

## 2024-09-18 RX ADMIN — FENTANYL CITRATE 200 MCG: 50 INJECTION INTRAMUSCULAR; INTRAVENOUS at 08:38

## 2024-09-18 RX ADMIN — CALCIUM CHLORIDE INJECTION 1 G: 100 INJECTION, SOLUTION INTRAVENOUS at 11:34

## 2024-09-18 RX ADMIN — PROPOFOL 50 MCG/KG/MIN: 10 INJECTION, EMULSION INTRAVENOUS at 12:30

## 2024-09-18 RX ADMIN — PROPOFOL 50 MG: 10 INJECTION, EMULSION INTRAVENOUS at 07:47

## 2024-09-18 RX ADMIN — SODIUM CHLORIDE, SODIUM GLUCONATE, SODIUM ACETATE, POTASSIUM CHLORIDE AND MAGNESIUM CHLORIDE: 526; 502; 368; 37; 30 INJECTION, SOLUTION INTRAVENOUS at 07:23

## 2024-09-18 RX ADMIN — PROTHROMBIN, COAGULATION FACTOR VII HUMAN, COAGULATION FACTOR IX HUMAN, COAGULATION FACTOR X HUMAN, PROTEIN C, PROTEIN S HUMAN, AND WATER 553 UNITS: KIT at 12:10

## 2024-09-18 RX ADMIN — SODIUM CHLORIDE 1.25 G/HR: 900 INJECTION, SOLUTION INTRAVENOUS at 08:56

## 2024-09-18 RX ADMIN — Medication 30 MG: at 08:37

## 2024-09-18 RX ADMIN — LIDOCAINE HYDROCHLORIDE 2 ML: 10 INJECTION, SOLUTION INFILTRATION; PERINEURAL at 07:43

## 2024-09-18 RX ADMIN — PROPOFOL 50 MCG/KG/MIN: 10 INJECTION, EMULSION INTRAVENOUS at 16:42

## 2024-09-18 RX ADMIN — VASOPRESSIN 0.5 UNITS/HR: 20 INJECTION, SOLUTION INTRAMUSCULAR; SUBCUTANEOUS at 10:11

## 2024-09-18 RX ADMIN — FENTANYL CITRATE 100 MCG: 50 INJECTION INTRAMUSCULAR; INTRAVENOUS at 20:06

## 2024-09-18 RX ADMIN — INSULIN HUMAN 2 UNITS/HR: 1 INJECTION, SOLUTION INTRAVENOUS at 10:10

## 2024-09-18 RX ADMIN — RIFAMPIN 600 MG: 600 INJECTION, POWDER, LYOPHILIZED, FOR SOLUTION INTRAVENOUS at 07:48

## 2024-09-18 RX ADMIN — NOREPINEPHRINE BITARTRATE 3.2 MCG: 1 INJECTION, SOLUTION, CONCENTRATE INTRAVENOUS at 09:15

## 2024-09-18 RX ADMIN — LEVOFLOXACIN 500 MG: 5 INJECTION, SOLUTION INTRAVENOUS at 07:21

## 2024-09-18 RX ADMIN — SUGAMMADEX 200 MG: 100 INJECTION, SOLUTION INTRAVENOUS at 21:30

## 2024-09-18 RX ADMIN — Medication 50 MCG/HR: at 15:14

## 2024-09-18 RX ADMIN — SODIUM CHLORIDE 7.5 G: 900 INJECTION, SOLUTION INTRAVENOUS at 08:01

## 2024-09-18 RX ADMIN — SODIUM PHOSPHATE, MONOBASIC, MONOHYDRATE AND SODIUM PHOSPHATE, DIBASIC, ANHYDROUS 9 MMOL: 142; 276 INJECTION, SOLUTION INTRAVENOUS at 17:14

## 2024-09-18 RX ADMIN — Medication 50 MG: at 19:45

## 2024-09-18 RX ADMIN — CALCIUM CHLORIDE 500 G: 100 INJECTION, SOLUTION INTRAVENOUS at 21:06

## 2024-09-18 RX ADMIN — Medication 20 MG: at 08:59

## 2024-09-18 RX ADMIN — PROTAMINE SULFATE 100 MG: 10 INJECTION, SOLUTION INTRAVENOUS at 17:06

## 2024-09-18 RX ADMIN — DOBUTAMINE HYDROCHLORIDE 2.5 MCG/KG/MIN: 200 INJECTION INTRAVENOUS at 15:35

## 2024-09-18 RX ADMIN — Medication 30 MG: at 11:08

## 2024-09-18 RX ADMIN — SUGAMMADEX 200 MG: 100 INJECTION, SOLUTION INTRAVENOUS at 13:06

## 2024-09-18 RX ADMIN — FENTANYL CITRATE 150 MCG: 50 INJECTION INTRAMUSCULAR; INTRAVENOUS at 20:24

## 2024-09-18 RX ADMIN — NOREPINEPHRINE BITARTRATE 6.4 MCG: 1 INJECTION, SOLUTION, CONCENTRATE INTRAVENOUS at 11:50

## 2024-09-18 RX ADMIN — MUPIROCIN: 20 OINTMENT TOPICAL at 06:58

## 2024-09-18 RX ADMIN — SODIUM CHLORIDE, POTASSIUM CHLORIDE, SODIUM LACTATE AND CALCIUM CHLORIDE 500 ML: 600; 310; 30; 20 INJECTION, SOLUTION INTRAVENOUS at 14:43

## 2024-09-18 RX ADMIN — Medication 50 MG: at 07:47

## 2024-09-18 RX ADMIN — EPINEPHRINE 0.02 MCG/KG/MIN: 1 INJECTION INTRAMUSCULAR; INTRAVENOUS; SUBCUTANEOUS at 22:57

## 2024-09-18 RX ADMIN — SODIUM CHLORIDE, POTASSIUM CHLORIDE, SODIUM LACTATE AND CALCIUM CHLORIDE: 600; 310; 30; 20 INJECTION, SOLUTION INTRAVENOUS at 07:23

## 2024-09-18 RX ADMIN — LIDOCAINE HYDROCHLORIDE 100 MG: 20 INJECTION, SOLUTION INFILTRATION; PERINEURAL at 07:47

## 2024-09-18 RX ADMIN — HEPARIN SODIUM 30000 UNITS: 1000 INJECTION INTRAVENOUS; SUBCUTANEOUS at 09:08

## 2024-09-18 RX ADMIN — NOREPINEPHRINE BITARTRATE 0.03 MCG/KG/MIN: 0.06 INJECTION, SOLUTION INTRAVENOUS at 15:01

## 2024-09-18 RX ADMIN — VANCOMYCIN HYDROCHLORIDE 1000 MG: 1 INJECTION, SOLUTION INTRAVENOUS at 06:50

## 2024-09-18 RX ADMIN — VANCOMYCIN HYDROCHLORIDE 1000 MG: 1 INJECTION, SOLUTION INTRAVENOUS at 19:45

## 2024-09-18 RX ADMIN — NOREPINEPHRINE BITARTRATE 6.4 MCG: 1 INJECTION, SOLUTION, CONCENTRATE INTRAVENOUS at 11:47

## 2024-09-18 RX ADMIN — POTASSIUM CHLORIDE 20 MEQ: 29.8 INJECTION, SOLUTION INTRAVENOUS at 22:57

## 2024-09-18 RX ADMIN — EPINEPHRINE 0.05 MCG/KG/MIN: 1 INJECTION INTRAMUSCULAR; INTRAVENOUS; SUBCUTANEOUS at 15:01

## 2024-09-18 RX ADMIN — CALCIUM CHLORIDE 500 G: 100 INJECTION, SOLUTION INTRAVENOUS at 20:19

## 2024-09-18 RX ADMIN — PROTAMINE SULFATE 10 MG: 10 INJECTION, SOLUTION INTRAVENOUS at 11:21

## 2024-09-18 RX ADMIN — FLUCONAZOLE IN SODIUM CHLORIDE 200 MG: 2 INJECTION, SOLUTION INTRAVENOUS at 07:34

## 2024-09-18 RX ADMIN — FENTANYL CITRATE 250 MCG: 50 INJECTION INTRAMUSCULAR; INTRAVENOUS at 07:47

## 2024-09-18 RX ADMIN — PROTAMINE SULFATE 240 MG: 10 INJECTION, SOLUTION INTRAVENOUS at 11:28

## 2024-09-18 RX ADMIN — EPINEPHRINE 0.03 MCG/KG/MIN: 1 INJECTION INTRAMUSCULAR; INTRAVENOUS; SUBCUTANEOUS at 07:49

## 2024-09-18 RX ADMIN — DOBUTAMINE HYDROCHLORIDE 2.5 MCG/KG/MIN: 200 INJECTION INTRAVENOUS at 15:02

## 2024-09-18 RX ADMIN — VASOPRESSIN 1 UNITS/HR: 20 INJECTION, SOLUTION INTRAMUSCULAR; SUBCUTANEOUS at 15:01

## 2024-09-18 RX ADMIN — SODIUM CHLORIDE, SODIUM GLUCONATE, SODIUM ACETATE, POTASSIUM CHLORIDE AND MAGNESIUM CHLORIDE: 526; 502; 368; 37; 30 INJECTION, SOLUTION INTRAVENOUS at 19:35

## 2024-09-18 RX ADMIN — SODIUM CHLORIDE, POTASSIUM CHLORIDE, SODIUM LACTATE AND CALCIUM CHLORIDE: 600; 310; 30; 20 INJECTION, SOLUTION INTRAVENOUS at 19:30

## 2024-09-18 RX ADMIN — FENTANYL CITRATE 100 MCG: 50 INJECTION INTRAMUSCULAR; INTRAVENOUS at 12:44

## 2024-09-18 ASSESSMENT — ACTIVITIES OF DAILY LIVING (ADL)
ADLS_ACUITY_SCORE: 26
ADLS_ACUITY_SCORE: 38
ADLS_ACUITY_SCORE: 26
ADLS_ACUITY_SCORE: 38
ADLS_ACUITY_SCORE: 26
ADLS_ACUITY_SCORE: 38
ADLS_ACUITY_SCORE: 26
ADLS_ACUITY_SCORE: 38

## 2024-09-18 ASSESSMENT — LIFESTYLE VARIABLES: TOBACCO_USE: 0

## 2024-09-18 ASSESSMENT — ENCOUNTER SYMPTOMS
DYSRHYTHMIAS: 1
DYSRHYTHMIAS: 1

## 2024-09-18 NOTE — TREATMENT PLAN
Brief Cardiology Note   09/18/2024    Duane C Johnson MRN# [id number]   Age: 74 year old YOB: 1950     I was asked to re-program the patient's [employer] dual chamber ICD in anticipation of HM3 implantation. On device interrogation his presenting rhythm was atrial fibrillation with variable Vs and  in the 70s. I re-programmed the device to MRI mode such that he is in VOO @ 80 (asynchronous pacing) and tachy therapies are off. I turned off the MRI time out feature and thus he will need his device re-programmed to restore original settings after his procedure.       Maxwell Green MD  Cardiology Fellow

## 2024-09-18 NOTE — CONSULTS
IR was consulted for feeding tube placement. IR does not place NJ tubes.     Please place XR Feeding Tube Placement order and call main radiology at [phone] to schedule with RADIOLOGY.    NADEGE Fletcher CNP  Interventional Radiology  IR on-call pager: [phone]

## 2024-09-18 NOTE — ANESTHESIA PROCEDURE NOTES
Perioperative MELBA Procedure Note    Staff -        Anesthesiologist:  Rio Quintanilla MD       Resident/Fellow: Isaias Urias DO       Performed By: anesthesiologist, fellow and with fellow       Procedure performed by resident/fellow/CRNA in presence of a teaching physician.    Preanesthesia Checklist:  Patient identified, IV assessed, risks and benefits discussed, monitors and equipment assessed, procedure being performed at surgeon's request and anesthesia consent obtained.    MELBA Probe Insertion    Probe Status PRE Insertion: NO obvious damage  Probe type:  Adult 3D  Bite block used:   Oral Airway  Insertion Technique: Easy, no oropharyngeal manipulation  Insertion complications: None obvious  Billing Report:MELBA report by Anesthesiologist (See Separate Report note)  Probe Status POST Removal: NO obvious damage    MELBA Report  General Procedure Information  Fellow/Resident Interpretation: I personally reviewed the images and the fellow/resident interpretation.  I agree with the fellow/resident interpretation as amended by myself.   Images for this study have been archived.  Modalities: 2D, 3D, CW Doppler, PW Doppler and Color flow mapping  Diagnostic indications for MELBA:   Non-ischemic cardiomyopathy  Echocardiographic and Doppler Measurements  Right Ventricle:  Cavity size dilated.    Hypertrophy not present.   Thrombus not present.    Global function mildly impaired.     Left Ventricle:  Cavity size dilated.    Hypertrophy present.   Thrombus not present.   Global Function severely impaired.   Ejection Fraction 15%.      Ventricular Regional Function:  1- Basal Anteroseptal:  hypokinetic  2- Basal Anterior:  hypokinetic  3- Basal Anterolateral:  hypokinetic  4- Basal Inferolateral:  hypokinetic  5- Basal Inferior:  hypokinetic  6- Basal Inferoseptal:  hypokinetic  7- Mid Anteroseptal:  hypokinetic  8- Mid Anterior:  hypokinetic  9- Mid Anterolateral:  hypokinetic  10- Mid Inferolateral:  hypokinetic  11- Mid  Inferior:  hypokinetic  12- Mid Inferoseptal:  hypokinetic  13- Apical Anterior:  hypokinetic  14- Apical Lateral:  hypokinetic  15- Apical Inferior:  hypokinetic  16- Apical Septal:  hypokinetic  17- Camargo:  hypokinetic    Valves  Aortic Valve: Annulus normal.  Stenosis not present.  Area: 1.82 cm .  Regurgitation absent.  Leaflets normal.  Leaflet motions normal.    Mitral Valve: Annulus dilated.  Stenosis not present.  Regurgitation +3.  Leaflets normal.  Leaflet motions normal.    Tricuspid Valve: Annulus dilated.  Stenosis not present.  Regurgitation +2.  Leaflets normal.  Leaflet motions normal.    Pulmonic Valve: Annulus normal.  Stenosis not present.  Regurgitation +1.      Aorta: Ascending Aorta: Size normal.  Diameter 3.52 cm.  Dissection not present.  Plaque thickness less than 3 mm.  Mobile plaque not present.    Aortic Arch: Size normal.    Dissection not present.   Plaque thickness less than 3 mm.   Mobile plaque not present.    Descending Aorta: Size normal.    Dissection not present.   Plaque thickness less than 3 mm.   Mobile plaque not present.      Right Atrium:  Size normal.   Spontaneous echo contrast not present.   Thrombus not present.   Tumor not present.   Device present.   Left Atrium: Size normal.  Spontaneous echo contrast not present.  Thrombus not present.  Tumor not present.  Device not present.    Left atrial appendage normal.   Other Atria Findings:  BRIE velocity < 40 cm/s  Atrial Septum: Intra-atrial septal morphology normal.       Ventricular Septum: Intra-ventricular septum morphology normal.      Diastolic Function Measurements:  Diastolic Dysfunction Grade= III.  E=  ms.  A=  ms.  E/A Ratio= .  DT=  ms.  S/D= .  IVRT= ms.  Other Findings:   Pericardium:  normal. Pleural Effusion:  left. Pulmonary Arteries:  normal. Pulmonary Venous Flow:  blunted (decreased) systolic flow. No coronary sinus catheter present.    Post Intervention Findings  Procedure(s) performed:  LVAD Placement.  Global function:  Improved. Regional wall motion: Unchanged. Surgeon(s) notified of all postintervention findings: Yes.         LVAD Placement:  LV decompressed. PFO not present. Aortic valve not opening.        Echocardiogram Comments  Echocardiogram comments:   PRE-CPB:   Severely reduced LV systolic function w/ LVEF 15%. Mildly reduced RV systolic function. Grade III DD. No AS or AI. Moderate functional MR with systolic PVF blunting. No intracardiac thrombus though BRIE velocity 30 cm/s. No intracardiac shunt by CFD and negative bubble study. No pericardial effusion. Left pleural effusion visualized. No aortic dissection. All findings communicated to surgical team.    POST-CPB:  S/p HM3 placement. Global LV systolic function mildly improved. RV function appears unchanged. No new RWMAs. MR is now mild. IVS appears midline and not bowing. Inflow cannula is appropriately positioned in the LV apex, directed toward the MV with a Vmax 110 cm/s. Outflow cannula visualized on the anterior ascending aorta with good laminar flow and Vmax 190 cm/s and Vmin 73 cm/s. Pleural effusion no longer present. No pericardial effusion. No aortic dissection. All findings communicated to surgical team.  .

## 2024-09-18 NOTE — PROGRESS NOTES
[facility]  Cardiology II Service / Advanced Heart Failure  Daily Progress Note    Patient: Duane C Johnson      : 1950      MRN: [id number]    Assessment/Plan:   Duane C Johnson is a 74 year old male pmhx of anterior STEMI s/p PCI to the pLAD on 10/26/23 with a VT/VF arrest the next day (10/27) with patent stents and unchanged anatomy on repeat angiogram. Placed on amiodarone and discharged 23, ICD was not indicated as this was within 48h of his MI. His EF prior to discharge was 20-30% with a large area of akinesis in the LAD, placed on apixaban due to this (Apixaban dcd on 24). Has had multiple admissions for HF exacerbation last year.  Admitted on 24. He presents for 2 weeks of worsening fatigue; found to have BNP of 16k and L pleural effusion. Admitted for diuresis and RHC. CPX and RHC showed significant functional limitations due to heart failue. Plan for DT VAD while inpatient. [place] placed on . S/p HM3 on .     # CAD s/p PCI to LAD  # h/o anterior STEMI on 10/26/23 with a VT/VF arrest the next day. Repeat angio showed patent stent on 10/27    # HFrEf 2/2 ICM (EF 15-20% by TTE 24) s/p HM3 on     # Afib DCCV on   # h/o VT/VF arrest in   # ICD placed on 24  Was on apixaban for low EF and history of Afib. Since there was no episode of Afib captured apixaban was stopped on 24. Device interrogation showing AF episode started on 24. Converted to A-paced rhythm after cardioversion on . Recurrence of AF on ; now v-paced.     #The patient's HeartMate 3 LVAD  was interrogated:  - MAP 70 mmHg by Doppler (MAP goal 65-85 mmHg)  - Speed 5600 rpm   - Flow  3.4 L/minute   - Pulsatility index 1.9   - Power 3.4 Dahl   - Fluid status: (-) euvolemic, (-) signs of LVF/RVF on exam   - The driveline exit site was inspected, c/d/i.   - All external components showed no evidence of damage or malfunction, none replaced.  -  Alarms were reviewed, and notable for: low PI, low flow     - ICD turned on on 9/18, PPM in DDDR mode  > formal interrogation pending     - repeated TTE post op showed no significant pericardial effusion and no compromise of LV/RV. aortic valve not opening. Mildly reduced RV.     Recommendations:    Increased output from mediastinal chest tube 1.7L with >200ml/hr:   - per CT surgery     LVAD speed adjustment:  - speed reduced to 5500    Antiplatelets/anticoagulation:  - okay to hold ASA and lipitor for now  - anticoagulation per CT surgery    Antiarrhythmic:   - continue with amiodarone 200mg daily      Pressors/inotropes:   - continue with dobutamine at 2.5 for now  - wean off vasopressin first with MAP goal 65-85mmhg > then wean off levophed next, if still hypertensive you can wean off epi but do not go lower than 0.03 for the next 24h  - If MAP>90 start nicardipine if the above mentioned options are exhausted  - hemodynamics q4h with LA     RV afterload:  - keep Earnestine @20 until he clears the LA and pressor requirements starts to decline    Diuretics:  - no need for diuretics until LA is cleared and then prn to keep CVP 8-12    Sedation:  - wean off propofol and prioritize fentanyl if sedation is needed  - would hold sedation for neuroexam q1h    Thank you for allowing me to care for this patient, please don't hesitate to contact me with any questions regarding this plan.     Pt was discussed and evaluated with Alonso Valle MD, attending physician, who agrees with the assessment and plan above.    Vladiimr Johnson MD, PhD, MSc  Cardiology Fellow      09/18/2024  ==================================    Subjective:     HM3 placement     Objective:     Vitals:    09/16/24 0553 09/16/24 0605 09/17/24 0600   Weight: 64.9 kg (143 lb 1.3 oz) 64.5 kg (142 lb 3.2 oz) 64.9 kg (143 lb 1.3 oz)     Vital Signs with Ranges  Temp:  [97.3  F (36.3  C)-98.4  F (36.9  C)] 97.3  F (36.3  C)  Pulse:  [70-92] 86  Resp:  [6-23]  12  BP: ()/(57-82) 95/65  MAP:  [68 mmHg-90 mmHg] 82 mmHg  Arterial Line BP: ()/(57-83) 87/69  SpO2:  [95 %-100 %] 96 %  I/O last 3 completed shifts:  In: 3976.83 [P.O.:240; I.V.:2886.83; Other:160; IV Piggyback:150]  Out: 3330 [Urine:2110; Blood:500; Chest Tube:720]    GENERAL: intubated sedated   HEENT: Atraumatic, moist mucus membranes, PERRL  NECK: R SGC  RESP: mechanical no wheezes/rhonchi/crackles.   CARDIO: Regular rate and rhythm. extremities warm and well perfused, no peripheral edema  ABD: Non-distended, non-tender, bowel sounds active  NEURO: sedated     Medications   Current Facility-Administered Medications   Medication Dose Route Frequency Provider Last Rate Last Admin    dextrose 10% infusion   Intravenous Continuous PRN [place] Malagon, DO        DOBUTamine (DOBUTREX) 500 mg in D5W 250 mL infusion (adult std conc)  2.5 mcg/kg/min (Dosing Weight) Intravenous Continuous [place] Malagon, DO 4.9 mL/hr at 09/18/24 1535 2.5 mcg/kg/min at 09/18/24 1535    EPINEPHrine (ADRENALIN) 5 mg in  mL infusion  0.01-0.1 mcg/kg/min Intravenous Continuous Julien Toribio MD 13.6 mL/hr at 09/18/24 1539 0.07 mcg/kg/min at 09/18/24 1539    fentaNYL (SUBLIMAZE) infusion   mcg/hr Intravenous Continuous Malagon [place], DO 1 mL/hr at 09/18/24 1514 50 mcg/hr at 09/18/24 1514    insulin regular (MYXREDLIN) 1 unit/mL infusion  0-24 Units/hr Intravenous Continuous Malagon, [place], DO        propofol (DIPRIVAN) infusion  5-75 mcg/kg/min (Dosing Weight) Intravenous Continuous Yessenia Peralta APRN CNP        And    Medication Instruction   Does not apply Continuous PRN Yessenia Peralta APRN CNP        norepinephrine (LEVOPHED) 16 mg in  mL infusion MAX CONC CENTRAL LINE  0.01-0.15 mcg/kg/min Intravenous Continuous Julien Toribio MD 1.2 mL/hr at 09/18/24 1539 0.02 mcg/kg/min at 09/18/24 1539    Reason beta blocker order not selected   Does not apply DOES NOT GO TO Julien Hill MD         vasopressin 1 unit/mL MAX Conc (PITRESSIN) infusion  0.5-4 Units/hr Intravenous Continuous Yessenia Peralta, NADEGE CNP 2 mL/hr at 09/18/24 1539 2 Units/hr at 09/18/24 1539     Current Facility-Administered Medications   Medication Dose Route Frequency Provider Last Rate Last Admin    acetaminophen (TYLENOL) tablet 975 mg  975 mg Oral Q8H Julien Toribio MD        [START ON 9/19/2024] aspirin (ASA) chewable tablet 81 mg  81 mg Oral or NG Tube Daily Julien Toribio MD        [START ON 9/19/2024] fluconazole (DIFLUCAN) intermittent infusion 200 mg  200 mg Intravenous Q24H Julien Toribio MD        [START ON 9/19/2024] heparin ANTICOAGULANT injection 5,000 Units  5,000 Units Subcutaneous Q8H Julien Toribio MD        [START ON 9/19/2024] levofloxacin (LEVAQUIN) infusion 500 mg  500 mg Intravenous Q24H Julien Toribio MD        pantoprazole (PROTONIX) 2 mg/mL suspension 40 mg  40 mg Oral or NG Tube Daily Julien Toribio MD        Or    pantoprazole (PROTONIX) EC tablet 40 mg  40 mg Oral Daily Julien Toribio MD        [START ON 9/19/2024] polyethylene glycol (MIRALAX) Packet 17 g  17 g Oral Daily Julien Toribio MD        [START ON 9/19/2024] rifampin (RIFADIN) 600 mg in sodium chloride 0.9 % 100 mL intermittent infusion  600 mg Intravenous Q24H Julien Toribio MD        senna-docusate (SENOKOT-S/PERICOLACE) 8.6-50 MG per tablet 1 tablet  1 tablet Oral BID Julien Toribio MD        sodium chloride (PF) 0.9% PF flush 3 mL  3 mL Intracatheter Q8H Julien Toribio MD        vancomycin (VANCOCIN) 1,000 mg in 200 mL dextrose intermittent infusion  1,000 mg Intravenous Q12H Julien Toribio MD           Data   Recent Labs   Lab 09/18/24  1548 09/18/24  1451 09/18/24  1350 09/18/24  1347 09/18/24  1229 09/18/24  1137 09/18/24  1133 09/18/24  0346 09/18/24  0344 09/17/24  1611   WBC  --  18.3*  --  17.3*  --   --   --   --  9.5  --    HGB  --  7.7*  --  8.1* 8.0*  --  8.7*   < > 8.8*  --    MCV  --  94  --  95  --   --   --   --  95  --    PLT  --   129*  --  130*  --  110*  --   --  220  --    INR  --  1.45*  --  1.44*  --  1.86*  --   --  1.24*  --    NA  --   --   --  132* 131*  --  131*   < > 128* 131*   POTASSIUM  --   --   --  4.1 3.9  --  4.1   < > 4.7 3.8   CHLORIDE  --   --   --  97*  --   --   --   --  91* 94*   CO2  --   --   --  25  --   --   --   --  28 28   BUN  --   --   --  28.8*  --   --   --   --  38.3* 25.3*   CR  --   --   --  0.75  --   --   --   --  0.96 0.85   ANIONGAP  --   --   --  10  --   --   --   --  9 9   PRANAV  --   --   --  8.0*  --   --   --   --  8.5* 8.0*   *  --  88 100* 122*  --  164*   < > 107* 91   ALBUMIN  --   --   --  2.7*  --   --   --   --  3.0*  --    PROTTOTAL  --   --   --  4.9*  --   --   --   --  6.2*  --    BILITOTAL  --   --   --  2.2*  --   --   --   --  0.3  --    ALKPHOS  --   --   --  77  --   --   --   --  106  --    ALT  --   --   --  36  --   --   --   --  40  --    AST  --   --   --  71*  --   --   --   --  44  --     < > = values in this interval not displayed.     RHC 9/3/24    RA:   RV:39  PA:  PCWP:16/22/15    Pa Sat:48.2%  Goldy; CO/CI: 2.91/1.71  Thermodilution; CO/CI:3.53/2.08   Right sided filling pressures are normal.   Left sided filling pressures are mildly elevated. Mild elevated pulmonary hypertension.   Reduced cardiac output level.         Echocardiogram Complete   Result Value    LVEF  15-20% (severely reduced)    [facility]    [id number]  [id number]  [id number]  [id number]^ROXANA^SONIA     [facility],[place]  Echocardiography Laboratory  [address] [place], [place] [address]     Name: JOHNSON, DUANE C  MRN: [id number]  : 1950  Study Date: 2024 08:20 AM  Age: 74 yrs  Gender: Male  Patient Location: [facility]  Reason For Study: CHF  Ordering Physician: SONIA SCHMIDT  Performed By: Yessenia Cano RDCS     BSA: 1.7 m2  Height: 66 in  Weight: 145 lb  HR: 71  BP: 94/73  mmHg  ______________________________________________________________________________  Procedure  Complete Portable Echo Adult. Contrast Optison. Optison (NDC #3237-4678-53)  given intravenously. Patient was given 6 ml mixture of 3 ml Optison and 6 ml  saline. 3 ml wasted.  ______________________________________________________________________________  Interpretation Summary  Left ventricular function is decreased. The ejection fraction is 15-20%  (severely reduced).  Moderate left ventricular dilation is present.  Apical wall akinesis is present.  Severe diffuse hypokinesis is present.  There is no thrombus seen in the left ventricle.  The right ventricle is normal size.  Global right ventricular function is normal.  Mild functional mitral insufficiency is present.  Mild to moderate tricuspid functional insufficiency is present.  Pulmonary hypertension is present.The right ventricular systolic pressure is  40mmHg above the right atrial pressure.  IVC diameter and respiratory changes fall into an intermediate range  suggesting an RA pressure of 8 mmHg.     This study was compared with the study from 2/19/2024 .MR, TR improved. LVEF  similar in both studies compared side to side. So overall no change in LVEF  ______________________________________________________________________________  Left Ventricle  Left ventricular wall thickness is normal. Moderate left ventricular dilation  is present. Left ventricular diastolic function is not assessable. Left  ventricular function is decreased. The ejection fraction is 15-20% (severely  reduced). Apical wall akinesis is present. Severe diffuse hypokinesis is  present. There is no thrombus seen in the left ventricle.     Right Ventricle  The right ventricle is normal size. Global right ventricular function is  normal. A pacemaker lead is noted in the right ventricle.     Atria  Moderate left atrial enlargement is present. Moderate right atrial enlargement  is present.      Mitral Valve  Mild mitral insufficiency is present.     Aortic Valve  Trileaflet aortic sclerosis without stenosis. On Doppler interrogation, there  is no significant stenosis or regurgitation.     Tricuspid Valve  Mild to moderate tricuspid insufficiency is present. The right ventricular  systolic pressure is approximated at 39.7 mmHg plus the right atrial pressure.  Pulmonary hypertension is present. The right ventricular systolic pressure is  40mmHg above the right atrial pressure.     Pulmonic Valve  On Doppler interrogation, there is no significant stenosis or regurgitation.     Vessels  The aorta root is normal. IVC diameter and respiratory changes fall into an  intermediate range suggesting an RA pressure of 8 mmHg.     Pericardium  No pericardial effusion is present.     Compared to Previous Study  This study was compared with the study from 2024 . MR, TR improved. LVEF  similar in both studies compared side to side. So overall no change in LVEF.  ______________________________________________________________________________  MMode/2D Measurements & Calculations     IVSd: 0.89 cm  LVIDd: 6.0 cm  LVIDs: 5.5 cm  LVPWd: 0.79 cm  FS: 7.1 %  LV mass(C)d: 197.0 grams  LV mass(C)dI: 112.9 grams/m2  LVOT diam: 2.0 cm  LVOT area: 3.2 cm2  EF Biplane: 16.8 %  LA Volume (BP): 71.3 ml  LA Volume Index (BP): 41.0 ml/m2  RV Base: 4.6 cm  RWT: 0.27     TAPSE: 1.0 cm     Doppler Measurements & Calculations  MV E max ivan: 82.7 cm/sec  LV V1 max P.9 mmHg  LV V1 max: 84.2 cm/sec  LV V1 VTI: 13.8 cm  MR PISA: 4.2 cm2  MR ERO: 0.33 cm2  MR volume: 35.7 ml  SV(LVOT): 43.8 ml  SI(LVOT): 25.1 ml/m2  PA acc time: 0.08 sec  TR max ivan: 315.1 cm/sec  TR max P.7 mmHg  RV S Ivan: 10.7 cm/sec     ______________________________________________________________________________  Report approved by: Jeanine MEJIA 2024 11:05 AM            Examination: XR CHEST 2 VIEWS 2024 3:06 PM     Indication: Shortness of  breath     Comparison: Radiograph 8/24/2024. CT 12/25/2023.     Findings:  PA and lateral views of the chest. Left chest wall implantable cardiac  defibrillator with grossly intact leads/shock coil. Coronary stenting.  Trachea is midline. Stable mild cardiomegaly. Mild blunting of the  left costophrenic angle, likely representing small pleural effusion.  No pneumothorax. No focal consolidation or any definite abnormal  airspace opacities. No acute or suspicious osseous abnormality.  Unremarkable visualized abdomen.      Impression   Impression:   1. Stable mild cardiomegaly with implantable cardiac defibrillator and  coronary stenting.  2. Small left pleural effusion.     I have personally reviewed the examination and initial interpretation  and I agree with the findings.     PANCHITO EID MD         SYSTEM ID:  [id number]      12/6/2023 CMR  Clinical history: 73 year old with anterior STEMI, cardiac arrest in Oct 2023  Comparison CMR: none  1. The left ventricle is severely enlarged. There is wall thinning and akinesis of the mid-distal anterior,  mid anteroseptum, distal septum and the apex  The global systolic function is severely reduced. The LVEF is 28%.  2. The right ventricle is normal in cavity size. The global systolic function is normal. The RVEF is 52%.   3. The right atrium is normal and the left atrium is severely enlarged.  4. There is mild mitral regurgitation.   5. There is transmural late gadolinium enhancement in mid-distal anterior, mid anteroseptum, distal  septum and the apex consistent with a large infarction in the LAD territory.   6. There is no pericardial effusion.  7. There is no intracardiac thrombus.  8. There are bilateral pleural effusions.  CONCLUSIONS:   Ischemic cardiomyopathy with a large anteroapical infarction.   Severely reduced left ventricular function and normal right ventricular function, LVEF 28% and RVEF 52%.     Cardiac Catheterization:  10/26/23    1st Mrg lesion is  20% stenosed.    Prox LAD to Mid LAD lesion is 100% stenosed.    1st Diag-1 lesion is 80% stenosed.    1st Diag-2 lesion is 50% stenosed.    Dist LAD lesion is 100% stenosed.    RPDA lesion is 70% stenosed.    Dist RCA lesion is 40% stenosed.     Anterior STEMI  Two vessel obstructive CAD (100% pLAD occlusion, 70% rPDA stenosis, 80% diagonal 1 stenosis)  PCI of pLAD to mLAD with BRENDA x 1 (Synergy 2.50x 28 mm) post dilated to 2.75 vessel  CVC placement in RIJ  LVEDP of 26 mmHg  Hemostasis of RRA with TR band      2/19/2024 Echo  Interpretation Summary     1. The left ventricle is moderately dilated. Left ventricular hypertrophy is  noted by two-dimensional echocardiography. Proximal septal thickening is  noted. Left ventricular systolic function is moderate to severely reduced. The  visual ejection fraction is 25-30%. Biplane LVEF is 25%. Diastolic Doppler  findings (E/E' ratio and/or other parameters) suggest left ventricular filling  pressures are increased. There is severe hypokinesis to akinesis of the mid  anterior/anterolateral/anteroseptal/inferoseptal walls and all apical segments  of the left ventricle. There is no thrombus seen in the left ventricle.  2. The right ventricle is normal size. The right ventricular systolic function  is normal.  3. The left atrium is moderately dilated. Right atrial size is normal. There  is no color Doppler evidence of an atrial shunt.  4. There is moderate to mod-severe (2-3+) mitral regurgitation.  5. There is mild to moderate (1-2+) tricuspid regurgitation.Right ventricular  systolic pressure is elevated, consistent with moderate pulmonary  hypertension.  6. No pericardial effusion.  7. In direct comparison to the previous study dated 10/30/2023, there has been  an interval increase in the degree of mitral regurgitation. The other findings  are similar.     RHC 5/6/24  RA: 10/9/6 mmHg  RV: 61/11 mmHg  PA: 63/24/38 mmHg  PCWP: Unable to obtain accurate measurement  CO/CI  (Goldy): 3.89/2.3 L/min/m2    PA sat: 64%  MAP: 85 mmHg       Right sided filling pressures are normal.    Mild elevated pulmonary hypertension.    Normal cardiac output level.        Device interrogated on 8/30/24  Device: [employer] D533 RESONATE HF ICD  Normal device function.  Mode: DDDR  bpm  AP: 33%  : 3%  Intrinsic rhythm: Afib w/ VS  bpm  Thoracic Impedance: Below reference line, suggests possible intrathoracic fluid accumulation.  Lead Trends Appear Stable.  Estimated battery longevity to RRT = 12.5 years.    Atrial Arrhythmia: AF episode began on 8/28/24  AF [place]: 11%  Anticoagulant: Eliquis  Ventricular Arrhythmia: None  Setting Changes: Rate response turned ON in device, pacing mode changed to DDDR from DDD, fallback rate during AT/AF episodes 70 bpm.

## 2024-09-18 NOTE — ANESTHESIA CARE TRANSFER NOTE
Patient: Duane C Johnson    Procedure: Procedure(s):  Median Sternotomy, INSERTION of LEFT VENTRICULAR ASSIST DEVICE (HEARTMATE III), cardiopulmonary bypass, transesophageal echocardiogram performed by anesthesia.       Diagnosis: Ischemic cardiomyopathy [I25.5]  Diagnosis Additional Information: No value filed.    Anesthesia Type:   General     Note:    Oropharynx: ventilatory support and endotracheal tube in place  Level of Consciousness: iatrogenic sedation      Independent Airway: airway patency not satisfactory and stable  Dentition: dentition unchanged  Vital Signs Stable: post-procedure vital signs reviewed and stable  Report to RN Given: handoff report given  Patient transferred to: ICU    ICU Handoff: Call for PAUSE to initiate/utilize ICU HANDOFF, Identified Patient, Identified Responsible Provider, Reviewed the Pertinent Medical History, Discussed Surgical Course, Reviewed Intra-OP Anesthesia Management and Issues during Anesthesia, Set Expectations for Post Procedure Period and Allowed Opportunity for Questions and Acknowledgement of Understanding      Vitals:  Vitals Value Taken Time   BP 87/62 (74)    Temp 36.4  C (97.52  F) 09/18/24 1335   Pulse 80 09/18/24 1335   Resp 14 09/18/24 1335   SpO2 98%    Vitals shown include unfiled device data.    Electronically Signed By: NADEGE Hayden CRNA  September 18, 2024  1:35 PM

## 2024-09-18 NOTE — ANESTHESIA PROCEDURE NOTES
Arterial Line Procedure Note    Pre-Procedure   Staff -        Anesthesiologist:  Rio Quintanilla MD       Performed By: anesthesiologist       Location: OR       Pre-Anesthestic Checklist: patient identified, IV checked, risks and benefits discussed, informed consent, monitors and equipment checked, pre-op evaluation and at physician/surgeon's request  Timeout:       Correct Patient: Yes        Correct Procedure: Yes        Correct Site: Yes        Correct Position: Yes   Line Placement:   This line was placed Pre Induction starting at 9/18/2024 7:43 AM and ending at 9/18/2024 7:45 AM  Procedure   Procedure: arterial line, new line and elective       Laterality: right       Insertion Site: radial.  Sterile Prep        Standard elements of sterile barrier followed       Skin prep: Chloraprep  Insertion/Injection        Technique: Seldinger Technique        6. There were no apparent abnormal pathologic findings.       Catheter Type/Size: 20 G, 12 cm  Narrative         Secured by: suture       Tegaderm dressing used.       Complications: None apparent,        Arterial waveform: Yes        IBP within 10% of NIBP: Yes

## 2024-09-18 NOTE — ANESTHESIA PREPROCEDURE EVALUATION
Anesthesia Pre-Procedure Evaluation    Patient: Duane C Johnson   MRN: [id number] : 1950        Procedure : Procedure(s):  NSERTION, LEFT VENTRICULAR ASSIST DEVICE (HEARTMATE III) AND ANY ASSOCIATED PROCEDURES          Past Medical History:   Diagnosis Date    Benign essential hypertension     CAD (coronary artery disease)     Chronic systolic heart failure (H)     Hypertension     ST elevation myocardial infarction (STEMI), unspecified artery (H)     Ventricular fibrillation (H)       Past Surgical History:   Procedure Laterality Date    ANESTHESIA CARDIOVERSION N/A 2024    Procedure: Anesthesia cardioversion;  Surgeon: GENERIC ANESTHESIA PROVIDER;  Location: [facility] OR    COLONOSCOPY N/A 3/23/2023    Procedure: COLONOSCOPY, FLEXIBLE, WITH LESION REMOVAL USING SNARE;  Surgeon: Jose Faustin MD;  Location: [facility]    CV CENTRAL VENOUS CATHETER PLACEMENT N/A 10/26/2023    Procedure: Central Venous Catheter Placement;  Surgeon: Rob Lyles MD;  Location: [facility] CARDIAC CATH LAB    CV CORONARY ANGIOGRAM N/A 10/27/2023    Procedure: Coronary Angiogram;  Surgeon: Rob Lyles MD;  Location: [facility] CARDIAC CATH LAB    CV CORONARY ANGIOGRAM N/A 10/26/2023    Procedure: Coronary Angiogram;  Surgeon: Rob Lyles MD;  Location: [facility] CARDIAC CATH LAB    CV LEFT HEART CATH N/A 10/26/2023    Procedure: Left Heart Catheterization;  Surgeon: Rob Lyles MD;  Location: [facility] CARDIAC CATH LAB    CV PCI N/A 10/27/2023    Procedure: Percutaneous Coronary Intervention;  Surgeon: Rob Lyles MD;  Location: [facility] CARDIAC CATH LAB    CV PCI STENT DRUG ELUTING N/A 10/26/2023    Procedure: Percutaneous Coronary Intervention Stent;  Surgeon: Rob Lyles MD;  Location: [facility] CARDIAC CATH LAB    CV RIGHT HEART CATH MEASUREMENTS RECORDED N/A 2024    Procedure: Heart Cath Right Heart Cath;  Surgeon: Rob Lyles MD;  Location: [facility]  CARDIAC CATH LAB    CV RIGHT HEART CATH MEASUREMENTS RECORDED N/A 9/3/2024    Procedure: Right Heart Catheterization;  Surgeon: Aaron Mesa MD;  Location: [facility] CARDIAC CATH LAB    EP ICD INSERT SINGLE N/A 2024    Procedure: Implantable Cardioverter Defibrillator Device & Lead Implant Dual;  Surgeon: Guero Black MD;  Location:  HEART CARDIAC CATH LAB    INJECTION, HYDROGEL SPACER N/A 2024    Procedure: INJECTION, HYDROGEL SPACER AND FIDUCIAL MARKER PLACEMENT;  Surgeon: Stanislaw Barros MD;  Location: [facility]      Allergies   Allergen Reactions    Brilinta [Ticagrelor] Other (See Comments) and Difficulty breathing     Per pt and spouse, hyperventilation.    Clonazepam Other (See Comments)     Per spouse, acted like a zombie and he was shaky, could barely talk.      Social History     Tobacco Use    Smoking status: Former     Current packs/day: 0.00     Average packs/day: 0.3 packs/day for 30.0 years (7.5 ttl pk-yrs)     Types: Cigarettes     Start date: 10/26/1993     Quit date: 10/26/2023     Years since quittin.8     Passive exposure: Past    Smokeless tobacco: Never    Tobacco comments:     Quit 10/26/2023   Substance Use Topics    Alcohol use: Yes     Comment: 1-2 beers per day      Wt Readings from Last 1 Encounters:   24 64.9 kg (143 lb 1.3 oz)        Anesthesia Evaluation   Pt has had prior anesthetic. Type: MAC.        ROS/MED HX  ENT/Pulmonary:  - neg pulmonary ROS   (+)                tobacco use, Current use,                       Neurologic:  - neg neurologic ROS     Cardiovascular: Comment: anterior STEMI s/p PCI to the pLAD on 10/26/23 with a VT/VF arrest the next day    Device Check  Full ICD interrogation performed after MRI complete.  Normal Device Function.   Intrinsic Rhythm: AF w/ VS  bpm  MRI Protection Mode Programmed OFF.  Pacing Programmed From OVO to DDDR  bpm.  ICD Tachy Therapies Programmed ON  Estimated Battery Longevity to RRT= 11  years.  Permanent Programming Changes: None    PMM/ICD reprogramed such that he is in VOO @ 80 (asynchronous pacing) and tachy therapies are off. MRI time out feature is also turned off so he will need his device re-programmed to restore original settings after his procedure.           (+) Dyslipidemia hypertension- -  CAD angina- past MI - stent-2023. 1  Taking blood thinners   CHF etiology: Chronic combined systolic and diastolic heart failure       pacemaker,    - Patient is dependent on pacemaker. ICD     dysrhythmias, a-fib,  valvular problems/murmurs type: MR    pulmonary hypertension, Previous cardiac testing   Echo: Date: 9/5/24 Results:  Left Ventricle  Mild left ventricular dilation is present. Left ventricular function is  decreased. The ejection fraction is 10-15% (severely reduced). Severe diffuse  hypokinesis is present.     Right Ventricle  The right ventricle is normal size. Global right ventricular function is  mildly reduced. A pacemaker lead is noted in the right ventricle.     Atria  The left atrial appendage is normal. It is free of spontaneous echo contrast  and thrombus. Moderate biatrial enlargement is present. No left atrial mass or  thrombus visualized. The atrial septum is intact as assessed by color  Doppler .     Mitral Valve  Mild mitral insufficiency is present. The regurgitant jet is broad and  originates from the A2/P2 cusps. EROA 0.08 cm2.     Aortic Valve  Aortic valve is normal in structure and function. The aortic valve is  tricuspid.     Tricuspid Valve  The tricuspid valve is normal. Mild tricuspid insufficiency is present. The  right ventricular systolic pressure is approximated at 37.2 mmHg plus the  right atrial pressure. Mild (pulmonary artery systolic pressure<50mmHg)  pulmonary hypertension is present.     Pulmonic Valve  The pulmonic valve is normal. Trace pulmonic insufficiency is present.     Vessels  Normal diameter aortic root and proximal ascending aorta. Mild  atheroma of the  aorta are present in the ascending aorta.     Pericardium  No pericardial effusion is present.    Stress Test:  Date: Results:    ECG Reviewed:  Date: 9/5/24 Results:    Atrial-paced rhythm with prolonged AV conduction  Right bundle branch block  Left anterior fascicular block  !!!Bifascicular block!!!  Anteroseptal infarct (cited on or before 26-Oct-2023)      Cath:  Date: 9/3/24 Results:  RA: 11/11/9  RV:39/9  PA:39/16/26  PCWP:16/22/15    Pa Sat:48.2%  Goldy; CO/CI: 2.91/1.71  Thermodilution; CO/CI:3.53/2.08 Right sided filling pressures are normal. Left sided filling pressures are mildly elevated. Mild elevated pulmonary hypertension. Reduced cardiac output level.        METS/Exercise Tolerance:     Hematologic:     (+)      anemia,          Musculoskeletal:   (+)  arthritis,             GI/Hepatic:  - neg GI/hepatic ROS     Renal/Genitourinary: Comment: Prostate cancer     (+)        BPH,      Endo:  - neg endo ROS     Psychiatric/Substance Use:     (+) psychiatric history anxiety       Infectious Disease:  - neg infectious disease ROS     Malignancy:   (+) Malignancy, History of Prostate.Prostate CA Active status post.      Other:            Physical Exam    Airway        Mallampati: I   TM distance: > 3 FB   Neck ROM: full   Mouth opening: > 3 cm    Respiratory Devices and Support         Dental     Comment: Teeth pulled 3 days ago    (+) Edentulous      Cardiovascular          Rhythm and rate: irregular and normal   (+) murmur       Pulmonary           breath sounds clear to auscultation           OUTSIDE LABS:  CBC:   Lab Results   Component Value Date    WBC 9.5 09/18/2024    WBC 7.9 09/17/2024    HGB 8.8 (L) 09/18/2024    HGB 8.8 (L) 09/17/2024    HCT 27.0 (L) 09/18/2024    HCT 26.7 (L) 09/17/2024     09/18/2024     09/17/2024     BMP:   Lab Results   Component Value Date     (L) 09/17/2024     (L) 09/17/2024    POTASSIUM 3.8 09/17/2024    POTASSIUM 4.2 09/17/2024  "   CHLORIDE 94 (L) 09/17/2024    CHLORIDE 92 (L) 09/17/2024    CO2 28 09/17/2024    CO2 26 09/17/2024    BUN 25.3 (H) 09/17/2024    BUN 34.5 (H) 09/17/2024    CR 0.85 09/17/2024    CR 0.92 09/17/2024     (H) 09/18/2024    GLC 91 09/17/2024     COAGS:   Lab Results   Component Value Date    PTT 57 (H) 09/04/2024    INR 1.24 (H) 09/18/2024     POC: No results found for: \"BGM\", \"HCG\", \"HCGS\"  HEPATIC:   Lab Results   Component Value Date    ALBUMIN 3.0 (L) 09/17/2024    PROTTOTAL 6.3 (L) 09/17/2024    ALT 34 09/17/2024    AST 29 09/17/2024    ALKPHOS 87 09/17/2024    BILITOTAL 0.4 09/17/2024     OTHER:   Lab Results   Component Value Date    PH 7.42 10/27/2023    LACT 0.8 09/18/2024    A1C 4.8 03/13/2023    PRANAV 8.0 (L) 09/17/2024    PHOS 3.5 09/04/2024    MAG 2.1 09/18/2024    LIPASE 28 12/30/2023    TSH 2.45 09/04/2024    T4 1.49 12/30/2023       Anesthesia Plan    ASA Status:  4    NPO Status:  NPO Appropriate    Anesthesia Type: General.     - Airway: ETT   Induction: Intravenous.   Maintenance: Balanced.   Techniques and Equipment:     - Lines/Monitors: Arterial Line, Central Line, CVP, PAC, BIS, NIRS, MELBA            MELBA Absolute Contra-indication: NONE            MELBA Relative Contra-indication: NONE     - Blood: T&S, Cell Saver, Blood in Room     - Drips/Meds: Epinephrine, Vasopressin, Norepi, Nitric Oxide     Consents    Anesthesia Plan(s) and associated risks, benefits, and realistic alternatives discussed. Questions answered and patient/representative(s) expressed understanding.     - Discussed:     - Discussed with:  Patient      - Extended Intubation/Ventilatory Support Discussed: Yes.      - Patient is DNR/DNI Status: No     Use of blood products discussed: Yes.     - Discussed with: Patient.     - Consented: consented to blood products     Postoperative Care    Pain management: Multi-modal analgesia.   PONV prophylaxis: Ondansetron (or other 5HT-3), Dexamethasone or Solumedrol     Comments:    Other " Comments: 74 year old gentleman with a history of HFrEF 2/2 ICM with LVEF 20-30%, atrial fibrillation, 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 Admitted for diuresis and RHC. EP consulted for consideration of DCCV, currently on heparin drip and holding apixaban. Does have slight DOMENICA likely 2/2 cardiorenal. He presents to the OR for LVAD placement.    Plan for GETA w/ arterial line, CVC, PAC and MELBA.           Rio Quintanilla MD    I have reviewed the pertinent notes and labs in the chart from the past 30 days and (re)examined the patient.  Any updates or changes from those notes are reflected in this note.

## 2024-09-18 NOTE — PROGRESS NOTES
Pt admitted to 4A CVICU s/p LVAD. Pt placed on ventilator settings: CMV 12 450 60% +5. Please see flowsheets for further information.

## 2024-09-18 NOTE — PROGRESS NOTES
Device RN paged and notified of PPM for OR at 0730. She will be here to assess. Per handoff, CV fellow did interrogate and reprogram device.

## 2024-09-18 NOTE — PROGRESS NOTES
Patient in OR for HM3 LVAD implant. Pump prepped by Franchesca Haddad VAD Coordinator and started by Franchesca Haddad VAD Coordinator. VAD speed titrated up in collaboration with surgeon, anesthesia, and perfusion. Report was given to 4A RN upon arrival to the unit.     Parameters when leaving OR:  Speed: 5400 rpm  Flow: 3.6 lpm  Power: 3.6 jackson  PI: 1.9  Flow range at set speed: 3.6-4 lpm                                             PI range at set speed: 1.9-3  Factors noted to cause a significant variation in VAD parameters: none  Other significant events: none    Driveline exits on the left side of the abdomen  Driveline is oriented/anchored toward the right    Please page the VAD Coordinator on-call with any VAD related questions (* * * [phone], job code [phone] from an internal line).     Tamiko Mcconnell RN

## 2024-09-18 NOTE — ANESTHESIA POSTPROCEDURE EVALUATION
Patient: Duane C Johnson    Procedure: Procedure(s):  Median Sternotomy, INSERTION of LEFT VENTRICULAR ASSIST DEVICE (HEARTMATE III), cardiopulmonary bypass, transesophageal echocardiogram performed by anesthesia.       Anesthesia Type:  General    Note:  Disposition: Inpatient; ICU            ICU Sign Out: Anesthesiologist/ICU physician sign out WAS performed   Postop Pain Control: Uneventful            Sign Out: Well controlled pain   PONV: No   Neuro/Psych: Uneventful            Sign Out: PLANNED postop sedation   Airway/Respiratory: Uneventful            Sign Out: AIRWAY IN SITU/Resp. Support               Airway in situ/Resp. Support: ETT                 Reason: Planned Pre-op   CV/Hemodynamics: Uneventful            Sign Out: Acceptable CV status; No obvious hypovolemia; No obvious fluid overload   Other NRE: NONE   DID A NON-ROUTINE EVENT OCCUR? No    Event details/Postop Comments:  Patient transported from OR to ICU w/ O2, LVAD, ambu, infusions, emergency medications, emergency airway equipment and LVAD coordinator. VSS throughout transport and handoff.           Last vitals:  Vitals:    09/18/24 0400 09/18/24 0500 09/18/24 0630   BP:   95/65   Pulse: 79 72 80   Resp: 23 16 16   Temp:   36.9  C (98.4  F)   SpO2: 99% 97% 96%       Electronically Signed By: Rio Quintanilla MD  September 18, 2024  1:37 PM

## 2024-09-18 NOTE — BRIEF OP NOTE
[facility]    Brief Operative Note    Pre-operative diagnosis: Ischemic cardiomyopathy [I25.5]  Post-operative diagnosis Same as pre-operative diagnosis    Procedure: Median Sternotomy, INSERTION of LEFT VENTRICULAR ASSIST DEVICE (HEARTMATE III), cardiopulmonary bypass, transesophageal echocardiogram performed by anesthesia., N/A - Heart    Surgeon: Surgeons and Role:     * Mulvihill, Michael, MD - Primary     * Taye Cannon MD - Resident - Observing     * Julien Toribio MD - Fellow - Assisting     * Jayson Rayo MD - Fellow - Assisting  Anesthesia: General   Estimated Blood Loss: 1000 ml    Drains: 2 med [address] tubes, 1 med inocencia drain, 1 left pleural angled tube  Specimens:   ID Type Source Tests Collected by Time Destination   1 : Left ventricle core Tissue Heart SURGICAL PATHOLOGY EXAM Mulvihill, Michael, MD 9/18/2024 10:50 AM      Findings:   Friable epicardium, adequate RV function, good hemodynamics and flows on LVAD .  Complications: None.  Implants:   Implant Name Type Inv. Item Serial No.  Lot No. LRB No. Used Action   thoratec heartmate 3 VAD cable   NA  [id number] N/A 1 Implanted   [employer] heartmate 3 sealed outflo graft with bend relief LVAD  NA  [id number] N/A 1 Implanted   [employer] heartmate 3 blood pump LVAD  NA  [id number] N/A 1 Implanted   [employer] heartmate 3 apical cuff LVAD  NA  [id number] N/A 1 Implanted           Julien Toribio MD  Cardiothoracic Surgery Fellow  Pager: [phone]

## 2024-09-18 NOTE — PLAN OF CARE
Major Shift Events:  No Acute Events Overnight. Sent  down for LVAD placement at 0540  Plan: LVAD placement today    For vital signs and complete assessments, please see documentation flowsheets.       Problem: Heart Failure  Goal: Optimal Cardiac Output  Outcome: Progressing  Intervention: Optimize Cardiac Output  Recent Flowsheet Documentation  Taken 9/18/2024 0400 by Amrik Oden RN  Stabilization Measures: legs elevated  Taken 9/18/2024 0000 by Amrik Oden RN  Stabilization Measures: legs elevated  Taken 9/17/2024 2000 by Amrik Oden RN  Stabilization Measures: legs elevated  Goal: Stable Heart Rate and Rhythm  Outcome: Progressing  Goal: Fluid and Electrolyte Balance  Outcome: Progressing  Goal: Effective Oxygenation and Ventilation  Outcome: Progressing  Intervention: Promote Airway Secretion Clearance  Recent Flowsheet Documentation  Taken 9/18/2024 0400 by Amrik Oden RN  Cough And Deep Breathing: done independently per patient  Activity Management:   activity adjusted per tolerance   activity encouraged  Taken 9/18/2024 0000 by Amrik Oden RN  Cough And Deep Breathing: done independently per patient  Activity Management:   activity adjusted per tolerance   activity encouraged  Taken 9/17/2024 2000 by Amrik Oden RN  Cough And Deep Breathing: done independently per patient  Activity Management:   activity adjusted per tolerance   activity encouraged  Intervention: Optimize Oxygenation and Ventilation  Recent Flowsheet Documentation  Taken 9/18/2024 0400 by Amrik Oden RN  Head of Bed (HOB) Positioning: HOB at 20-30 degrees  Taken 9/18/2024 0000 by Amrik Oden RN  Head of Bed (HOB) Positioning: HOB at 20-30 degrees  Taken 9/17/2024 2000 by Amrik Oden RN  Head of Bed (HOB) Positioning: HOB at 20-30 degrees   Goal Outcome Evaluation:

## 2024-09-18 NOTE — ANESTHESIA PROCEDURE NOTES
Airway       Patient location during procedure: OR       Procedure Start/Stop Times: 9/18/2024 7:51 AM  Staff -        Anesthesiologist:  Rio Quintanilla MD       CRNA: Leo Allen APRN CRNA       Other Anesthesia Staff: Monica Bradshaw       Performed By: SRNA  Consent for Airway        Urgency: elective  Indications and Patient Condition       Indications for airway management: mayra-procedural       Induction type:intravenous       Mask difficulty assessment: 1 - vent by mask    Final Airway Details       Final airway type: endotracheal airway       Successful airway: ETT - single and Oral  Endotracheal Airway Details        ETT size (mm): 8.0       Cuffed: yes       Successful intubation technique: direct laryngoscopy       DL Blade Type: [place] 2       Grade View of Cords: 1       Adjucts: stylet       Position: Right       Measured from: gums/teeth       Secured at (cm): 21       Bite block used: None    Post intubation assessment        Placement verified by: capnometry, equal breath sounds and chest rise        Number of attempts at approach: 1       Number of other approaches attempted: 0       Secured with: tape       Ease of procedure: easy       Dentition: Intact    Medication(s) Administered   Medication Administration Time: 9/18/2024 7:51 AM

## 2024-09-18 NOTE — ANESTHESIA PREPROCEDURE EVALUATION
Anesthesia Pre-Procedure Evaluation    Patient: Duane C Johnson   MRN: [id number] : 1950        Procedure : Procedure(s):  Irrigation and debridement chest washout, combined          Past Medical History:   Diagnosis Date    Benign essential hypertension     CAD (coronary artery disease)     Chronic systolic heart failure (H)     Hypertension     ST elevation myocardial infarction (STEMI), unspecified artery (H)     Ventricular fibrillation (H)       Past Surgical History:   Procedure Laterality Date    ANESTHESIA CARDIOVERSION N/A 2024    Procedure: Anesthesia cardioversion;  Surgeon: GENERIC ANESTHESIA PROVIDER;  Location: [facility]    COLONOSCOPY N/A 3/23/2023    Procedure: COLONOSCOPY, FLEXIBLE, WITH LESION REMOVAL USING SNARE;  Surgeon: Jose Faustin MD;  Location: [facility]    CV CENTRAL VENOUS CATHETER PLACEMENT N/A 10/26/2023    Procedure: Central Venous Catheter Placement;  Surgeon: Rob Lyles MD;  Location:  HEART CARDIAC CATH LAB    CV CORONARY ANGIOGRAM N/A 10/27/2023    Procedure: Coronary Angiogram;  Surgeon: Rob Lyles MD;  Location: [facility] CARDIAC CATH LAB    CV CORONARY ANGIOGRAM N/A 10/26/2023    Procedure: Coronary Angiogram;  Surgeon: Rob Lyles MD;  Location:  [facility] CARDIAC CATH LAB    CV LEFT HEART CATH N/A 10/26/2023    Procedure: Left Heart Catheterization;  Surgeon: Rob Lyles MD;  Location:  [facility] CARDIAC CATH LAB    CV PCI N/A 10/27/2023    Procedure: Percutaneous Coronary Intervention;  Surgeon: Rob Lyles MD;  Location:  [facility] CARDIAC CATH LAB    CV PCI STENT DRUG ELUTING N/A 10/26/2023    Procedure: Percutaneous Coronary Intervention Stent;  Surgeon: Rob Lyles MD;  Location:  HEART CARDIAC CATH LAB    CV RIGHT HEART CATH MEASUREMENTS RECORDED N/A 2024    Procedure: Heart Cath Right Heart Cath;  Surgeon: Rob Lyles MD;  Location: [facility] CARDIAC CATH LAB    CV RIGHT HEART  CATH MEASUREMENTS RECORDED N/A 9/3/2024    Procedure: Right Heart Catheterization;  Surgeon: Aaron Mesa MD;  Location:  HEART CARDIAC CATH LAB    EP ICD INSERT SINGLE N/A 2024    Procedure: Implantable Cardioverter Defibrillator Device & Lead Implant Dual;  Surgeon: Guero Black MD;  Location:  HEART CARDIAC CATH LAB    INJECTION, HYDROGEL SPACER N/A 2024    Procedure: INJECTION, HYDROGEL SPACER AND FIDUCIAL MARKER PLACEMENT;  Surgeon: Stanislaw Barros MD;  Location: [facility]      Allergies   Allergen Reactions    Brilinta [Ticagrelor] Other (See Comments) and Difficulty breathing     Per pt and spouse, hyperventilation.    Clonazepam Other (See Comments)     Per spouse, acted like a zombie and he was shaky, could barely talk.      Social History     Tobacco Use    Smoking status: Former     Current packs/day: 0.00     Average packs/day: 0.3 packs/day for 30.0 years (7.5 ttl pk-yrs)     Types: Cigarettes     Start date: 10/26/1993     Quit date: 10/26/2023     Years since quittin.8     Passive exposure: Past    Smokeless tobacco: Never    Tobacco comments:     Quit 10/26/2023   Substance Use Topics    Alcohol use: Yes     Comment: 1-2 beers per day      Wt Readings from Last 1 Encounters:   24 64.9 kg (143 lb 1.3 oz)        Anesthesia Evaluation            ROS/MED HX  ENT/Pulmonary:    (-) tobacco use   Neurologic:    (-) no CVA   Cardiovascular: Comment: S/p HM3, on epi/dobutamine, 5500rpm PI 1.8  Volume down on bedside TTE    (+)  hypertension- -  CAD -  - -      CHF             ICD     dysrhythmias, a-fib,          (-) pulmonary hypertension   METS/Exercise Tolerance:     Hematologic:     (+)      anemia, history of blood transfusion,         Musculoskeletal:       GI/Hepatic:    (-) esophageal disease   Renal/Genitourinary:    (-) renal disease   Endo:    (-) chronic steroid usage   Psychiatric/Substance Use:       Infectious Disease:    (-) Recent Fever   Malignancy:      "  Other:            Physical Exam    Airway   unable to assess          Respiratory Devices and Support    ETT:      Dental    unable to assess        Cardiovascular       Comment: Aflutter 80s   Rhythm and rate: irregular and normal     Pulmonary           breath sounds clear to auscultation           OUTSIDE LABS:  CBC:   Lab Results   Component Value Date    WBC 18.3 (H) 09/18/2024    WBC 17.3 (H) 09/18/2024    HGB 7.7 (L) 09/18/2024    HGB 8.1 (L) 09/18/2024    HCT 22.9 (L) 09/18/2024    HCT 24.3 (L) 09/18/2024     (L) 09/18/2024     (L) 09/18/2024     BMP:   Lab Results   Component Value Date     (L) 09/18/2024     (L) 09/18/2024    POTASSIUM 4.3 09/18/2024    POTASSIUM 4.1 09/18/2024    CHLORIDE 95 (L) 09/18/2024    CHLORIDE 97 (L) 09/18/2024    CO2 25 09/18/2024    CO2 25 09/18/2024    BUN 29.0 (H) 09/18/2024    BUN 28.8 (H) 09/18/2024    CR 0.75 09/18/2024    CR 0.75 09/18/2024     (H) 09/18/2024     (H) 09/18/2024     COAGS:   Lab Results   Component Value Date    PTT 66 (H) 09/18/2024    INR 1.40 (H) 09/18/2024    FIBR 323 09/18/2024     POC: No results found for: \"BGM\", \"HCG\", \"HCGS\"  HEPATIC:   Lab Results   Component Value Date    ALBUMIN 2.7 (L) 09/18/2024    PROTTOTAL 4.9 (L) 09/18/2024    ALT 36 09/18/2024    AST 71 (H) 09/18/2024    ALKPHOS 77 09/18/2024    BILITOTAL 2.2 (H) 09/18/2024     OTHER:   Lab Results   Component Value Date    PH 7.39 09/18/2024    LACT 2.9 (H) 09/18/2024    A1C 5.4 09/18/2024    PRANAV 7.9 (L) 09/18/2024    PHOS 2.6 09/18/2024    MAG 2.3 09/18/2024    LIPASE 28 12/30/2023    TSH 2.45 09/04/2024    T4 1.49 12/30/2023       Anesthesia Plan    ASA Status:  4, emergent       Anesthesia Type: General.     - Airway: ETT         Techniques and Equipment:     - Lines/Monitors: 2nd IV, Arterial Line, Central Line, PAC, CVP, NIRS, BIS, CVL in situ     - Blood: Blood in Room, T&C, T&S     Consents            Postoperative Care        "     Comments:               Trey Frost MD    I have reviewed the pertinent notes and labs in the chart from the past 30 days and (re)examined the patient.  Any updates or changes from those notes are reflected in this note.

## 2024-09-19 ENCOUNTER — APPOINTMENT (OUTPATIENT)
Dept: GENERAL RADIOLOGY | Facility: CLINIC | Age: 74
DRG: 001 | End: 2024-09-19
Attending: INTERNAL MEDICINE
Payer: MEDICARE

## 2024-09-19 ENCOUNTER — APPOINTMENT (OUTPATIENT)
Dept: CARDIOLOGY | Facility: CLINIC | Age: 74
DRG: 001 | End: 2024-09-19
Attending: STUDENT IN AN ORGANIZED HEALTH CARE EDUCATION/TRAINING PROGRAM
Payer: MEDICARE

## 2024-09-19 ENCOUNTER — APPOINTMENT (OUTPATIENT)
Dept: GENERAL RADIOLOGY | Facility: CLINIC | Age: 74
DRG: 001 | End: 2024-09-19
Attending: STUDENT IN AN ORGANIZED HEALTH CARE EDUCATION/TRAINING PROGRAM
Payer: MEDICARE

## 2024-09-19 ENCOUNTER — APPOINTMENT (OUTPATIENT)
Dept: GENERAL RADIOLOGY | Facility: CLINIC | Age: 74
DRG: 001 | End: 2024-09-19
Payer: MEDICARE

## 2024-09-19 LAB
ALBUMIN SERPL BCG-MCNC: 2.9 G/DL (ref 3.5–5.2)
ALBUMIN UR-MCNC: 20 MG/DL
ALLEN'S TEST: ABNORMAL
ALP SERPL-CCNC: 64 U/L (ref 40–150)
ALT SERPL W P-5'-P-CCNC: 30 U/L (ref 0–70)
ANION GAP SERPL CALCULATED.3IONS-SCNC: 10 MMOL/L (ref 7–15)
ANION GAP SERPL CALCULATED.3IONS-SCNC: 10 MMOL/L (ref 7–15)
ANION GAP SERPL CALCULATED.3IONS-SCNC: 6 MMOL/L (ref 7–15)
APPEARANCE UR: CLEAR
APTT PPP: 36 SECONDS (ref 22–38)
APTT PPP: 37 SECONDS (ref 22–38)
AST SERPL W P-5'-P-CCNC: 68 U/L (ref 0–45)
ATRIAL RATE - MUSE: 46 BPM
ATRIAL RATE - MUSE: 70 BPM
BASE EXCESS BLDA CALC-SCNC: 3.4 MMOL/L (ref -3–3)
BASE EXCESS BLDA CALC-SCNC: 4.1 MMOL/L (ref -3–3)
BASE EXCESS BLDA CALC-SCNC: 4.2 MMOL/L (ref -3–3)
BASE EXCESS BLDA CALC-SCNC: 4.2 MMOL/L (ref -3–3)
BASE EXCESS BLDA CALC-SCNC: 4.5 MMOL/L (ref -3–3)
BASE EXCESS BLDV CALC-SCNC: 3.8 MMOL/L (ref -3–3)
BASE EXCESS BLDV CALC-SCNC: 4.6 MMOL/L (ref -3–3)
BASE EXCESS BLDV CALC-SCNC: 4.8 MMOL/L (ref -3–3)
BASE EXCESS BLDV CALC-SCNC: 4.8 MMOL/L (ref -3–3)
BASE EXCESS BLDV CALC-SCNC: 5.1 MMOL/L (ref -3–3)
BASE EXCESS BLDV CALC-SCNC: 5.2 MMOL/L (ref -3–3)
BASE EXCESS BLDV CALC-SCNC: 5.7 MMOL/L (ref -3–3)
BILIRUB DIRECT SERPL-MCNC: 2.58 MG/DL (ref 0–0.3)
BILIRUB SERPL-MCNC: 3.8 MG/DL
BILIRUB SERPL-MCNC: 3.9 MG/DL
BILIRUB UR QL STRIP: NEGATIVE
BUN SERPL-MCNC: 20.7 MG/DL (ref 8–23)
BUN SERPL-MCNC: 26.6 MG/DL (ref 8–23)
BUN SERPL-MCNC: 26.6 MG/DL (ref 8–23)
CA-I BLD-MCNC: 4.5 MG/DL (ref 4.4–5.2)
CA-I BLD-MCNC: 4.7 MG/DL (ref 4.4–5.2)
CALCIUM SERPL-MCNC: 8 MG/DL (ref 8.8–10.4)
CALCIUM SERPL-MCNC: 8.6 MG/DL (ref 8.8–10.4)
CALCIUM SERPL-MCNC: 8.6 MG/DL (ref 8.8–10.4)
CHLORIDE SERPL-SCNC: 98 MMOL/L (ref 98–107)
CHLORIDE SERPL-SCNC: 98 MMOL/L (ref 98–107)
CHLORIDE SERPL-SCNC: 99 MMOL/L (ref 98–107)
COHGB MFR BLD: 97.6 % (ref 95–96)
COHGB MFR BLD: 99.3 % (ref 95–96)
COHGB MFR BLD: >100 % (ref 95–96)
COLOR UR AUTO: ABNORMAL
CREAT SERPL-MCNC: 0.65 MG/DL (ref 0.67–1.17)
CREAT SERPL-MCNC: 0.7 MG/DL (ref 0.67–1.17)
CREAT SERPL-MCNC: 0.7 MG/DL (ref 0.67–1.17)
DIASTOLIC BLOOD PRESSURE - MUSE: NORMAL MMHG
DIASTOLIC BLOOD PRESSURE - MUSE: NORMAL MMHG
EGFRCR SERPLBLD CKD-EPI 2021: >90 ML/MIN/1.73M2
ERYTHROCYTE [DISTWIDTH] IN BLOOD BY AUTOMATED COUNT: 16.5 % (ref 10–15)
ERYTHROCYTE [DISTWIDTH] IN BLOOD BY AUTOMATED COUNT: 17.4 % (ref 10–15)
FIBRINOGEN PPP-MCNC: 346 MG/DL (ref 170–510)
FIBRINOGEN PPP-MCNC: 404 MG/DL (ref 170–510)
GLUCOSE BLDC GLUCOMTR-MCNC: 124 MG/DL (ref 70–99)
GLUCOSE BLDC GLUCOMTR-MCNC: 138 MG/DL (ref 70–99)
GLUCOSE BLDC GLUCOMTR-MCNC: 140 MG/DL (ref 70–99)
GLUCOSE BLDC GLUCOMTR-MCNC: 142 MG/DL (ref 70–99)
GLUCOSE BLDC GLUCOMTR-MCNC: 143 MG/DL (ref 70–99)
GLUCOSE BLDC GLUCOMTR-MCNC: 145 MG/DL (ref 70–99)
GLUCOSE BLDC GLUCOMTR-MCNC: 148 MG/DL (ref 70–99)
GLUCOSE BLDC GLUCOMTR-MCNC: 149 MG/DL (ref 70–99)
GLUCOSE BLDC GLUCOMTR-MCNC: 149 MG/DL (ref 70–99)
GLUCOSE BLDC GLUCOMTR-MCNC: 151 MG/DL (ref 70–99)
GLUCOSE BLDC GLUCOMTR-MCNC: 156 MG/DL (ref 70–99)
GLUCOSE BLDC GLUCOMTR-MCNC: 178 MG/DL (ref 70–99)
GLUCOSE SERPL-MCNC: 131 MG/DL (ref 70–99)
GLUCOSE SERPL-MCNC: 131 MG/DL (ref 70–99)
GLUCOSE SERPL-MCNC: 147 MG/DL (ref 70–99)
GLUCOSE UR STRIP-MCNC: NEGATIVE MG/DL
HCO3 BLD-SCNC: 28 MMOL/L (ref 21–28)
HCO3 BLD-SCNC: 29 MMOL/L (ref 21–28)
HCO3 BLDV-SCNC: 29 MMOL/L (ref 21–28)
HCO3 BLDV-SCNC: 30 MMOL/L (ref 21–28)
HCO3 BLDV-SCNC: 31 MMOL/L (ref 21–28)
HCO3 SERPL-SCNC: 25 MMOL/L (ref 22–29)
HCO3 SERPL-SCNC: 25 MMOL/L (ref 22–29)
HCO3 SERPL-SCNC: 26 MMOL/L (ref 22–29)
HCT VFR BLD AUTO: 25.3 % (ref 40–53)
HCT VFR BLD AUTO: 27.6 % (ref 40–53)
HGB BLD-MCNC: 8.9 G/DL (ref 13.3–17.7)
HGB BLD-MCNC: 9.8 G/DL (ref 13.3–17.7)
HGB UR QL STRIP: NEGATIVE
HYALINE CASTS: 1 /LPF
INR PPP: 1.24 (ref 0.85–1.15)
INR PPP: 1.32 (ref 0.85–1.15)
INTERPRETATION ECG - MUSE: NORMAL
INTERPRETATION ECG - MUSE: NORMAL
KETONES UR STRIP-MCNC: NEGATIVE MG/DL
LACTATE SERPL-SCNC: 1.3 MMOL/L (ref 0.7–2)
LACTATE SERPL-SCNC: 1.3 MMOL/L (ref 0.7–2)
LACTATE SERPL-SCNC: 1.4 MMOL/L (ref 0.7–2)
LACTATE SERPL-SCNC: 1.6 MMOL/L (ref 0.7–2)
LACTATE SERPL-SCNC: 1.9 MMOL/L (ref 0.7–2)
LDH SERPL L TO P-CCNC: 429 U/L (ref 0–250)
LEUKOCYTE ESTERASE UR QL STRIP: NEGATIVE
MAGNESIUM SERPL-MCNC: 1.9 MG/DL (ref 1.7–2.3)
MAGNESIUM SERPL-MCNC: 2.1 MG/DL (ref 1.7–2.3)
MCH RBC QN AUTO: 30.8 PG (ref 26.5–33)
MCH RBC QN AUTO: 30.8 PG (ref 26.5–33)
MCHC RBC AUTO-ENTMCNC: 35.2 G/DL (ref 31.5–36.5)
MCHC RBC AUTO-ENTMCNC: 35.5 G/DL (ref 31.5–36.5)
MCV RBC AUTO: 87 FL (ref 78–100)
MCV RBC AUTO: 88 FL (ref 78–100)
MDC_IDC_LEAD_CONNECTION_STATUS: NORMAL
MDC_IDC_LEAD_CONNECTION_STATUS: NORMAL
MDC_IDC_LEAD_IMPLANT_DT: NORMAL
MDC_IDC_LEAD_IMPLANT_DT: NORMAL
MDC_IDC_LEAD_LOCATION: NORMAL
MDC_IDC_LEAD_LOCATION: NORMAL
MDC_IDC_LEAD_LOCATION_DETAIL_1: NORMAL
MDC_IDC_LEAD_LOCATION_DETAIL_1: NORMAL
MDC_IDC_LEAD_MFG: NORMAL
MDC_IDC_LEAD_MFG: NORMAL
MDC_IDC_LEAD_MODEL: NORMAL
MDC_IDC_LEAD_MODEL: NORMAL
MDC_IDC_LEAD_POLARITY_TYPE: NORMAL
MDC_IDC_LEAD_POLARITY_TYPE: NORMAL
MDC_IDC_LEAD_SERIAL: NORMAL
MDC_IDC_LEAD_SERIAL: NORMAL
MDC_IDC_LEAD_SPECIAL_FUNCTION: NORMAL
MDC_IDC_LEAD_SPECIAL_FUNCTION: NORMAL
MDC_IDC_MSMT_BATTERY_DTM: NORMAL
MDC_IDC_MSMT_BATTERY_REMAINING_LONGEVITY: 126 MO
MDC_IDC_MSMT_BATTERY_REMAINING_PERCENTAGE: 100 %
MDC_IDC_MSMT_BATTERY_STATUS: NORMAL
MDC_IDC_MSMT_CAP_CHARGE_DTM: NORMAL
MDC_IDC_MSMT_CAP_CHARGE_TIME: 9.4 S
MDC_IDC_MSMT_CAP_CHARGE_TYPE: NORMAL
MDC_IDC_MSMT_LEADCHNL_RA_IMPEDANCE_VALUE: 463 OHM
MDC_IDC_MSMT_LEADCHNL_RV_IMPEDANCE_VALUE: 288 OHM
MDC_IDC_MSMT_LEADCHNL_RV_PACING_THRESHOLD_AMPLITUDE: 0.6 V
MDC_IDC_MSMT_LEADCHNL_RV_PACING_THRESHOLD_PULSEWIDTH: 0.4 MS
MDC_IDC_PG_IMPLANT_DTM: NORMAL
MDC_IDC_PG_MFG: NORMAL
MDC_IDC_PG_MODEL: NORMAL
MDC_IDC_PG_SERIAL: NORMAL
MDC_IDC_PG_TYPE: NORMAL
MDC_IDC_SESS_CLINIC_NAME: NORMAL
MDC_IDC_SESS_DTM: NORMAL
MDC_IDC_SESS_TYPE: NORMAL
MDC_IDC_SET_BRADY_AT_MODE_SWITCH_MODE: NORMAL
MDC_IDC_SET_BRADY_AT_MODE_SWITCH_RATE: 170 {BEATS}/MIN
MDC_IDC_SET_BRADY_LOWRATE: 60 {BEATS}/MIN
MDC_IDC_SET_BRADY_MAX_SENSOR_RATE: 130 {BEATS}/MIN
MDC_IDC_SET_BRADY_MAX_TRACKING_RATE: 130 {BEATS}/MIN
MDC_IDC_SET_BRADY_MODE: NORMAL
MDC_IDC_SET_BRADY_PAV_DELAY_HIGH: 200 MS
MDC_IDC_SET_BRADY_PAV_DELAY_LOW: 300 MS
MDC_IDC_SET_BRADY_SAV_DELAY_HIGH: 200 MS
MDC_IDC_SET_BRADY_SAV_DELAY_LOW: 300 MS
MDC_IDC_SET_LEADCHNL_RA_PACING_AMPLITUDE: 5 V
MDC_IDC_SET_LEADCHNL_RA_PACING_CAPTURE_MODE: NORMAL
MDC_IDC_SET_LEADCHNL_RA_PACING_POLARITY: NORMAL
MDC_IDC_SET_LEADCHNL_RA_PACING_PULSEWIDTH: 0.4 MS
MDC_IDC_SET_LEADCHNL_RA_SENSING_ADAPTATION_MODE: NORMAL
MDC_IDC_SET_LEADCHNL_RA_SENSING_POLARITY: NORMAL
MDC_IDC_SET_LEADCHNL_RA_SENSING_SENSITIVITY: 0.25 MV
MDC_IDC_SET_LEADCHNL_RV_PACING_AMPLITUDE: 2 V
MDC_IDC_SET_LEADCHNL_RV_PACING_CAPTURE_MODE: NORMAL
MDC_IDC_SET_LEADCHNL_RV_PACING_POLARITY: NORMAL
MDC_IDC_SET_LEADCHNL_RV_PACING_PULSEWIDTH: 0.4 MS
MDC_IDC_SET_LEADCHNL_RV_SENSING_ADAPTATION_MODE: NORMAL
MDC_IDC_SET_LEADCHNL_RV_SENSING_POLARITY: NORMAL
MDC_IDC_SET_LEADCHNL_RV_SENSING_SENSITIVITY: 0.6 MV
MDC_IDC_SET_ZONE_DETECTION_INTERVAL: 250 MS
MDC_IDC_SET_ZONE_DETECTION_INTERVAL: 300 MS
MDC_IDC_SET_ZONE_DETECTION_INTERVAL: 353 MS
MDC_IDC_SET_ZONE_STATUS: NORMAL
MDC_IDC_SET_ZONE_TYPE: NORMAL
MDC_IDC_SET_ZONE_VENDOR_TYPE: NORMAL
MDC_IDC_STAT_BRADY_DTM_END: NORMAL
MDC_IDC_STAT_BRADY_DTM_START: NORMAL
MDC_IDC_STAT_BRADY_RA_PERCENT_PACED: 0 %
MDC_IDC_STAT_BRADY_RV_PERCENT_PACED: 0 %
MDC_IDC_STAT_EPISODE_RECENT_COUNT: 0
MDC_IDC_STAT_EPISODE_RECENT_COUNT_DTM_END: NORMAL
MDC_IDC_STAT_EPISODE_RECENT_COUNT_DTM_START: NORMAL
MDC_IDC_STAT_EPISODE_TYPE: NORMAL
MDC_IDC_STAT_EPISODE_VENDOR_TYPE: NORMAL
MDC_IDC_STAT_TACHYTHERAPY_ATP_DELIVERED_RECENT: 0
MDC_IDC_STAT_TACHYTHERAPY_ATP_DELIVERED_TOTAL: 0
MDC_IDC_STAT_TACHYTHERAPY_RECENT_DTM_END: NORMAL
MDC_IDC_STAT_TACHYTHERAPY_RECENT_DTM_START: NORMAL
MDC_IDC_STAT_TACHYTHERAPY_SHOCKS_ABORTED_RECENT: 0
MDC_IDC_STAT_TACHYTHERAPY_SHOCKS_ABORTED_TOTAL: 0
MDC_IDC_STAT_TACHYTHERAPY_SHOCKS_DELIVERED_RECENT: 0
MDC_IDC_STAT_TACHYTHERAPY_SHOCKS_DELIVERED_TOTAL: 0
MDC_IDC_STAT_TACHYTHERAPY_TOTAL_DTM_END: NORMAL
MDC_IDC_STAT_TACHYTHERAPY_TOTAL_DTM_START: NORMAL
MUCOUS THREADS #/AREA URNS LPF: PRESENT /LPF
NITRATE UR QL: NEGATIVE
O2/TOTAL GAS SETTING VFR VENT: 40 %
O2/TOTAL GAS SETTING VFR VENT: 45 %
O2/TOTAL GAS SETTING VFR VENT: 45 %
O2/TOTAL GAS SETTING VFR VENT: 60 %
O2/TOTAL GAS SETTING VFR VENT: 60 %
OXYHGB MFR BLDV: 64 % (ref 70–75)
OXYHGB MFR BLDV: 69 % (ref 70–75)
OXYHGB MFR BLDV: 69 % (ref 70–75)
OXYHGB MFR BLDV: 72 % (ref 70–75)
OXYHGB MFR BLDV: 73 % (ref 70–75)
OXYHGB MFR BLDV: 74 % (ref 70–75)
OXYHGB MFR BLDV: 75 % (ref 70–75)
P AXIS - MUSE: NORMAL DEGREES
P AXIS - MUSE: NORMAL DEGREES
PCO2 BLD: 37 MM HG (ref 35–45)
PCO2 BLD: 38 MM HG (ref 35–45)
PCO2 BLD: 40 MM HG (ref 35–45)
PCO2 BLDV: 43 MM HG (ref 40–50)
PCO2 BLDV: 45 MM HG (ref 40–50)
PCO2 BLDV: 45 MM HG (ref 40–50)
PCO2 BLDV: 47 MM HG (ref 40–50)
PEEP: 5 CM H2O
PH BLD: 7.45 [PH] (ref 7.35–7.45)
PH BLD: 7.46 [PH] (ref 7.35–7.45)
PH BLD: 7.46 [PH] (ref 7.35–7.45)
PH BLD: 7.47 [PH] (ref 7.35–7.45)
PH BLD: 7.49 [PH] (ref 7.35–7.45)
PH BLDV: 7.43 [PH] (ref 7.32–7.43)
PH BLDV: 7.44 [PH] (ref 7.32–7.43)
PH BLDV: 7.44 [PH] (ref 7.32–7.43)
PH BLDV: 7.45 [PH] (ref 7.32–7.43)
PH UR STRIP: 5.5 [PH] (ref 5–7)
PHOSPHATE SERPL-MCNC: 3.3 MG/DL (ref 2.5–4.5)
PHOSPHATE SERPL-MCNC: 3.4 MG/DL (ref 2.5–4.5)
PHOSPHATE SERPL-MCNC: 3.8 MG/DL (ref 2.5–4.5)
PLATELET # BLD AUTO: 109 10E3/UL (ref 150–450)
PLATELET # BLD AUTO: 113 10E3/UL (ref 150–450)
PO2 BLD: 147 MM HG (ref 80–105)
PO2 BLD: 173 MM HG (ref 80–105)
PO2 BLD: 229 MM HG (ref 80–105)
PO2 BLD: 76 MM HG (ref 80–105)
PO2 BLD: 89 MM HG (ref 80–105)
PO2 BLDV: 33 MM HG (ref 25–47)
PO2 BLDV: 35 MM HG (ref 25–47)
PO2 BLDV: 35 MM HG (ref 25–47)
PO2 BLDV: 37 MM HG (ref 25–47)
PO2 BLDV: 38 MM HG (ref 25–47)
PO2 BLDV: 39 MM HG (ref 25–47)
PO2 BLDV: 39 MM HG (ref 25–47)
POTASSIUM SERPL-SCNC: 4.5 MMOL/L (ref 3.4–5.3)
POTASSIUM SERPL-SCNC: 4.6 MMOL/L (ref 3.4–5.3)
POTASSIUM SERPL-SCNC: 4.6 MMOL/L (ref 3.4–5.3)
PR INTERVAL - MUSE: NORMAL MS
PR INTERVAL - MUSE: NORMAL MS
PROT SERPL-MCNC: 5.2 G/DL (ref 6.4–8.3)
QRS DURATION - MUSE: 146 MS
QRS DURATION - MUSE: 148 MS
QT - MUSE: 562 MS
QT - MUSE: 562 MS
QTC - MUSE: 610 MS
QTC - MUSE: 652 MS
R AXIS - MUSE: 225 DEGREES
R AXIS - MUSE: 236 DEGREES
RBC # BLD AUTO: 2.89 10E6/UL (ref 4.4–5.9)
RBC # BLD AUTO: 3.18 10E6/UL (ref 4.4–5.9)
RBC URINE: 1 /HPF
SAO2 % BLDA: 95 % (ref 92–100)
SAO2 % BLDA: 96 % (ref 92–100)
SAO2 % BLDA: 97 % (ref 92–100)
SAO2 % BLDA: 98 % (ref 92–100)
SAO2 % BLDA: 98 % (ref 92–100)
SAO2 % BLDV: 66.2 % (ref 70–75)
SAO2 % BLDV: 71.3 % (ref 70–75)
SAO2 % BLDV: 71.7 % (ref 70–75)
SAO2 % BLDV: 74.6 % (ref 70–75)
SAO2 % BLDV: 75.8 % (ref 70–75)
SAO2 % BLDV: 76.1 % (ref 70–75)
SAO2 % BLDV: 77.5 % (ref 70–75)
SODIUM SERPL-SCNC: 131 MMOL/L (ref 135–145)
SODIUM SERPL-SCNC: 133 MMOL/L (ref 135–145)
SODIUM SERPL-SCNC: 133 MMOL/L (ref 135–145)
SP GR UR STRIP: 1.03 (ref 1–1.03)
SYSTOLIC BLOOD PRESSURE - MUSE: NORMAL MMHG
SYSTOLIC BLOOD PRESSURE - MUSE: NORMAL MMHG
T AXIS - MUSE: 25 DEGREES
T AXIS - MUSE: 79 DEGREES
TRANSITIONAL EPI: <1 /HPF
UROBILINOGEN UR STRIP-MCNC: NORMAL MG/DL
VENTRICULAR RATE- MUSE: 71 BPM
VENTRICULAR RATE- MUSE: 81 BPM
WBC # BLD AUTO: 10.4 10E3/UL (ref 4–11)
WBC # BLD AUTO: 8.3 10E3/UL (ref 4–11)
WBC URINE: 1 /HPF

## 2024-09-19 PROCEDURE — 250N000013 HC RX MED GY IP 250 OP 250 PS 637: Performed by: SURGERY

## 2024-09-19 PROCEDURE — 71045 X-RAY EXAM CHEST 1 VIEW: CPT | Mod: 26 | Performed by: RADIOLOGY

## 2024-09-19 PROCEDURE — 200N000002 HC R&B ICU UMMC

## 2024-09-19 PROCEDURE — 258N000003 HC RX IP 258 OP 636

## 2024-09-19 PROCEDURE — 250N000011 HC RX IP 250 OP 636: Performed by: SURGERY

## 2024-09-19 PROCEDURE — 99291 CRITICAL CARE FIRST HOUR: CPT | Mod: 25 | Performed by: INTERNAL MEDICINE

## 2024-09-19 PROCEDURE — 999N000157 HC STATISTIC RCP TIME EA 10 MIN

## 2024-09-19 PROCEDURE — 85384 FIBRINOGEN ACTIVITY: CPT | Performed by: NURSE PRACTITIONER

## 2024-09-19 PROCEDURE — 85384 FIBRINOGEN ACTIVITY: CPT

## 2024-09-19 PROCEDURE — 83605 ASSAY OF LACTIC ACID: CPT | Performed by: NURSE PRACTITIONER

## 2024-09-19 PROCEDURE — 84100 ASSAY OF PHOSPHORUS: CPT | Performed by: NURSE PRACTITIONER

## 2024-09-19 PROCEDURE — 83735 ASSAY OF MAGNESIUM: CPT | Performed by: NURSE PRACTITIONER

## 2024-09-19 PROCEDURE — 80048 BASIC METABOLIC PNL TOTAL CA: CPT | Performed by: NURSE PRACTITIONER

## 2024-09-19 PROCEDURE — 82435 ASSAY OF BLOOD CHLORIDE: CPT | Performed by: NURSE PRACTITIONER

## 2024-09-19 PROCEDURE — 82805 BLOOD GASES W/O2 SATURATION: CPT

## 2024-09-19 PROCEDURE — 80053 COMPREHEN METABOLIC PANEL: CPT

## 2024-09-19 PROCEDURE — 71045 X-RAY EXAM CHEST 1 VIEW: CPT

## 2024-09-19 PROCEDURE — 85730 THROMBOPLASTIN TIME PARTIAL: CPT | Performed by: NURSE PRACTITIONER

## 2024-09-19 PROCEDURE — 250N000013 HC RX MED GY IP 250 OP 250 PS 637: Performed by: NURSE PRACTITIONER

## 2024-09-19 PROCEDURE — 250N000011 HC RX IP 250 OP 636

## 2024-09-19 PROCEDURE — 250N000013 HC RX MED GY IP 250 OP 250 PS 637

## 2024-09-19 PROCEDURE — 93321 DOPPLER ECHO F-UP/LMTD STD: CPT | Mod: 26 | Performed by: INTERNAL MEDICINE

## 2024-09-19 PROCEDURE — 258N000003 HC RX IP 258 OP 636: Performed by: PHYSICIAN ASSISTANT

## 2024-09-19 PROCEDURE — 250N000013 HC RX MED GY IP 250 OP 250 PS 637: Performed by: INTERNAL MEDICINE

## 2024-09-19 PROCEDURE — 82330 ASSAY OF CALCIUM: CPT | Performed by: SURGERY

## 2024-09-19 PROCEDURE — 81001 URINALYSIS AUTO W/SCOPE: CPT | Performed by: STUDENT IN AN ORGANIZED HEALTH CARE EDUCATION/TRAINING PROGRAM

## 2024-09-19 PROCEDURE — 85610 PROTHROMBIN TIME: CPT

## 2024-09-19 PROCEDURE — 74340 X-RAY GUIDE FOR GI TUBE: CPT

## 2024-09-19 PROCEDURE — 82248 BILIRUBIN DIRECT: CPT | Performed by: STUDENT IN AN ORGANIZED HEALTH CARE EDUCATION/TRAINING PROGRAM

## 2024-09-19 PROCEDURE — 250N000009 HC RX 250: Performed by: STUDENT IN AN ORGANIZED HEALTH CARE EDUCATION/TRAINING PROGRAM

## 2024-09-19 PROCEDURE — 93308 TTE F-UP OR LMTD: CPT

## 2024-09-19 PROCEDURE — 85610 PROTHROMBIN TIME: CPT | Performed by: NURSE PRACTITIONER

## 2024-09-19 PROCEDURE — 82330 ASSAY OF CALCIUM: CPT

## 2024-09-19 PROCEDURE — 94003 VENT MGMT INPAT SUBQ DAY: CPT

## 2024-09-19 PROCEDURE — 93308 TTE F-UP OR LMTD: CPT | Mod: 26 | Performed by: INTERNAL MEDICINE

## 2024-09-19 PROCEDURE — 83605 ASSAY OF LACTIC ACID: CPT

## 2024-09-19 PROCEDURE — 82805 BLOOD GASES W/O2 SATURATION: CPT | Performed by: INTERNAL MEDICINE

## 2024-09-19 PROCEDURE — 258N000003 HC RX IP 258 OP 636: Performed by: SURGERY

## 2024-09-19 PROCEDURE — 82805 BLOOD GASES W/O2 SATURATION: CPT | Performed by: SURGERY

## 2024-09-19 PROCEDURE — 83735 ASSAY OF MAGNESIUM: CPT

## 2024-09-19 PROCEDURE — 84100 ASSAY OF PHOSPHORUS: CPT

## 2024-09-19 PROCEDURE — 93325 DOPPLER ECHO COLOR FLOW MAPG: CPT

## 2024-09-19 PROCEDURE — 85027 COMPLETE CBC AUTOMATED: CPT

## 2024-09-19 PROCEDURE — 74018 RADEX ABDOMEN 1 VIEW: CPT | Mod: 26 | Performed by: RADIOLOGY

## 2024-09-19 PROCEDURE — 93750 INTERROGATION VAD IN PERSON: CPT | Mod: GC | Performed by: INTERNAL MEDICINE

## 2024-09-19 PROCEDURE — 93283 PRGRMG EVAL IMPLANTABLE DFB: CPT

## 2024-09-19 PROCEDURE — 85730 THROMBOPLASTIN TIME PARTIAL: CPT

## 2024-09-19 PROCEDURE — 85014 HEMATOCRIT: CPT | Performed by: NURSE PRACTITIONER

## 2024-09-19 PROCEDURE — 83615 LACTATE (LD) (LDH) ENZYME: CPT | Performed by: STUDENT IN AN ORGANIZED HEALTH CARE EDUCATION/TRAINING PROGRAM

## 2024-09-19 PROCEDURE — 82805 BLOOD GASES W/O2 SATURATION: CPT | Performed by: STUDENT IN AN ORGANIZED HEALTH CARE EDUCATION/TRAINING PROGRAM

## 2024-09-19 PROCEDURE — 999N000065 XR CHEST PORT 1 VIEW

## 2024-09-19 PROCEDURE — 43752 NASAL/OROGASTRIC W/TUBE PLMT: CPT | Mod: GC | Performed by: STUDENT IN AN ORGANIZED HEALTH CARE EDUCATION/TRAINING PROGRAM

## 2024-09-19 PROCEDURE — 0DH67UZ INSERTION OF FEEDING DEVICE INTO STOMACH, VIA NATURAL OR ARTIFICIAL OPENING: ICD-10-PCS | Performed by: STUDENT IN AN ORGANIZED HEALTH CARE EDUCATION/TRAINING PROGRAM

## 2024-09-19 PROCEDURE — 94799 UNLISTED PULMONARY SVC/PX: CPT

## 2024-09-19 PROCEDURE — 93325 DOPPLER ECHO COLOR FLOW MAPG: CPT | Mod: 26 | Performed by: INTERNAL MEDICINE

## 2024-09-19 PROCEDURE — 93283 PRGRMG EVAL IMPLANTABLE DFB: CPT | Mod: 26 | Performed by: INTERNAL MEDICINE

## 2024-09-19 PROCEDURE — 74018 RADEX ABDOMEN 1 VIEW: CPT

## 2024-09-19 RX ORDER — LIDOCAINE HYDROCHLORIDE 20 MG/ML
15 SOLUTION OROPHARYNGEAL ONCE
Status: COMPLETED | OUTPATIENT
Start: 2024-09-19 | End: 2024-09-19

## 2024-09-19 RX ORDER — ACETAMINOPHEN 325 MG/1
975 TABLET ORAL EVERY 8 HOURS
Status: COMPLETED | OUTPATIENT
Start: 2024-09-19 | End: 2024-09-21

## 2024-09-19 RX ORDER — GABAPENTIN 250 MG/5ML
100 SOLUTION ORAL AT BEDTIME
Status: DISCONTINUED | OUTPATIENT
Start: 2024-09-20 | End: 2024-09-21

## 2024-09-19 RX ORDER — MAGNESIUM SULFATE HEPTAHYDRATE 40 MG/ML
2 INJECTION, SOLUTION INTRAVENOUS ONCE
Status: COMPLETED | OUTPATIENT
Start: 2024-09-19 | End: 2024-09-19

## 2024-09-19 RX ORDER — ATORVASTATIN CALCIUM 80 MG/1
80 TABLET, FILM COATED ORAL EVERY EVENING
Status: DISCONTINUED | OUTPATIENT
Start: 2024-09-19 | End: 2024-10-05 | Stop reason: HOSPADM

## 2024-09-19 RX ORDER — ESCITALOPRAM OXALATE 10 MG/1
10 TABLET ORAL DAILY
Status: DISCONTINUED | OUTPATIENT
Start: 2024-09-19 | End: 2024-10-05 | Stop reason: HOSPADM

## 2024-09-19 RX ORDER — METHOCARBAMOL 750 MG/1
750 TABLET, FILM COATED ORAL 3 TIMES DAILY PRN
Status: DISCONTINUED | OUTPATIENT
Start: 2024-09-19 | End: 2024-09-26

## 2024-09-19 RX ORDER — MULTIVITAMIN,THERAPEUTIC
1 TABLET ORAL DAILY
Status: DISCONTINUED | OUTPATIENT
Start: 2024-09-19 | End: 2024-10-05 | Stop reason: HOSPADM

## 2024-09-19 RX ORDER — AMOXICILLIN 250 MG
1 CAPSULE ORAL 2 TIMES DAILY
Status: DISCONTINUED | OUTPATIENT
Start: 2024-09-19 | End: 2024-09-20

## 2024-09-19 RX ORDER — POLYETHYLENE GLYCOL 3350 17 G/17G
17 POWDER, FOR SOLUTION ORAL DAILY
Status: DISCONTINUED | OUTPATIENT
Start: 2024-09-20 | End: 2024-09-20

## 2024-09-19 RX ORDER — AMINO ACIDS/PROTEIN HYDROLYS 11G-40/45
1 LIQUID IN PACKET (ML) ORAL 2 TIMES DAILY
Status: DISCONTINUED | OUTPATIENT
Start: 2024-09-19 | End: 2024-09-22

## 2024-09-19 RX ORDER — GABAPENTIN 100 MG/1
100 CAPSULE ORAL AT BEDTIME
Status: DISCONTINUED | OUTPATIENT
Start: 2024-09-19 | End: 2024-09-19

## 2024-09-19 RX ORDER — AMIODARONE HYDROCHLORIDE 200 MG/1
200 TABLET ORAL DAILY
Status: DISCONTINUED | OUTPATIENT
Start: 2024-09-19 | End: 2024-10-05 | Stop reason: HOSPADM

## 2024-09-19 RX ADMIN — SODIUM CHLORIDE, POTASSIUM CHLORIDE, SODIUM LACTATE AND CALCIUM CHLORIDE 500 ML: 600; 310; 30; 20 INJECTION, SOLUTION INTRAVENOUS at 07:34

## 2024-09-19 RX ADMIN — SENNOSIDES AND DOCUSATE SODIUM 1 TABLET: 8.6; 5 TABLET ORAL at 20:13

## 2024-09-19 RX ADMIN — POLYETHYLENE GLYCOL 3350 17 G: 17 POWDER, FOR SOLUTION ORAL at 09:08

## 2024-09-19 RX ADMIN — ACETAMINOPHEN 975 MG: 325 TABLET ORAL at 01:59

## 2024-09-19 RX ADMIN — HEPARIN SODIUM 5000 UNITS: 5000 INJECTION, SOLUTION INTRAVENOUS; SUBCUTANEOUS at 20:13

## 2024-09-19 RX ADMIN — SODIUM CHLORIDE, POTASSIUM CHLORIDE, SODIUM LACTATE AND CALCIUM CHLORIDE 1000 ML: 600; 310; 30; 20 INJECTION, SOLUTION INTRAVENOUS at 23:21

## 2024-09-19 RX ADMIN — PROPOFOL 50 MCG/KG/MIN: 10 INJECTION, EMULSION INTRAVENOUS at 04:37

## 2024-09-19 RX ADMIN — SODIUM CHLORIDE, POTASSIUM CHLORIDE, SODIUM LACTATE AND CALCIUM CHLORIDE 500 ML: 600; 310; 30; 20 INJECTION, SOLUTION INTRAVENOUS at 17:31

## 2024-09-19 RX ADMIN — Medication 60 ML: at 20:15

## 2024-09-19 RX ADMIN — GABAPENTIN 100 MG: 100 CAPSULE ORAL at 22:17

## 2024-09-19 RX ADMIN — LIDOCAINE HYDROCHLORIDE 15 ML: 20 SOLUTION ORAL at 10:01

## 2024-09-19 RX ADMIN — POTASSIUM & SODIUM PHOSPHATES POWDER PACK 280-160-250 MG 1 PACKET: 280-160-250 PACK at 01:59

## 2024-09-19 RX ADMIN — ACETAMINOPHEN 975 MG: 325 TABLET ORAL at 09:08

## 2024-09-19 RX ADMIN — EPINEPHRINE 0.08 MCG/KG/MIN: 1 INJECTION INTRAMUSCULAR; INTRAVENOUS; SUBCUTANEOUS at 20:42

## 2024-09-19 RX ADMIN — VANCOMYCIN HYDROCHLORIDE 1000 MG: 1 INJECTION, SOLUTION INTRAVENOUS at 17:54

## 2024-09-19 RX ADMIN — PROPOFOL 40 MCG/KG/MIN: 10 INJECTION, EMULSION INTRAVENOUS at 21:14

## 2024-09-19 RX ADMIN — AMIODARONE HYDROCHLORIDE 200 MG: 200 TABLET ORAL at 12:22

## 2024-09-19 RX ADMIN — ATORVASTATIN CALCIUM 80 MG: 80 TABLET, FILM COATED ORAL at 20:13

## 2024-09-19 RX ADMIN — LEVOFLOXACIN 500 MG: 5 INJECTION, SOLUTION INTRAVENOUS at 07:44

## 2024-09-19 RX ADMIN — SODIUM CHLORIDE, POTASSIUM CHLORIDE, SODIUM LACTATE AND CALCIUM CHLORIDE 1000 ML: 600; 310; 30; 20 INJECTION, SOLUTION INTRAVENOUS at 01:14

## 2024-09-19 RX ADMIN — SODIUM CHLORIDE, POTASSIUM CHLORIDE, SODIUM LACTATE AND CALCIUM CHLORIDE 1000 ML: 600; 310; 30; 20 INJECTION, SOLUTION INTRAVENOUS at 04:37

## 2024-09-19 RX ADMIN — SENNOSIDES AND DOCUSATE SODIUM 1 TABLET: 8.6; 5 TABLET ORAL at 09:08

## 2024-09-19 RX ADMIN — FLUCONAZOLE IN SODIUM CHLORIDE 200 MG: 2 INJECTION, SOLUTION INTRAVENOUS at 07:44

## 2024-09-19 RX ADMIN — Medication 40 MG: at 09:08

## 2024-09-19 RX ADMIN — Medication 10 MG: at 04:00

## 2024-09-19 RX ADMIN — PROPOFOL 40 MCG/KG/MIN: 10 INJECTION, EMULSION INTRAVENOUS at 15:32

## 2024-09-19 RX ADMIN — THERA TABS 1 TABLET: TAB at 14:28

## 2024-09-19 RX ADMIN — VANCOMYCIN HYDROCHLORIDE 1000 MG: 1 INJECTION, SOLUTION INTRAVENOUS at 05:48

## 2024-09-19 RX ADMIN — ESCITALOPRAM OXALATE 10 MG: 10 TABLET ORAL at 12:22

## 2024-09-19 RX ADMIN — SODIUM CHLORIDE 600 MG: 9 INJECTION, SOLUTION INTRAVENOUS at 07:46

## 2024-09-19 RX ADMIN — Medication 10 MG: at 04:17

## 2024-09-19 RX ADMIN — HEPARIN SODIUM 5000 UNITS: 5000 INJECTION, SOLUTION INTRAVENOUS; SUBCUTANEOUS at 12:22

## 2024-09-19 RX ADMIN — POTASSIUM & SODIUM PHOSPHATES POWDER PACK 280-160-250 MG 1 PACKET: 280-160-250 PACK at 05:26

## 2024-09-19 RX ADMIN — MAGNESIUM SULFATE HEPTAHYDRATE 2 G: 2 INJECTION, SOLUTION INTRAVENOUS at 16:46

## 2024-09-19 RX ADMIN — Medication 60 ML: at 14:28

## 2024-09-19 RX ADMIN — ACETAMINOPHEN 975 MG: 325 TABLET ORAL at 17:41

## 2024-09-19 ASSESSMENT — ACTIVITIES OF DAILY LIVING (ADL)
ADLS_ACUITY_SCORE: 37
ADLS_ACUITY_SCORE: 41
ADLS_ACUITY_SCORE: 39
ADLS_ACUITY_SCORE: 41
ADLS_ACUITY_SCORE: 37
ADLS_ACUITY_SCORE: 41
ADLS_ACUITY_SCORE: 39
ADLS_ACUITY_SCORE: 37
ADLS_ACUITY_SCORE: 41
ADLS_ACUITY_SCORE: 41
ADLS_ACUITY_SCORE: 39
ADLS_ACUITY_SCORE: 41
ADLS_ACUITY_SCORE: 39
ADLS_ACUITY_SCORE: 41
ADLS_ACUITY_SCORE: 37

## 2024-09-19 NOTE — OP NOTE
Operative Report - LVAD Implant    Date of Surgery: 9/19/2024    PREOPERATIVE DIAGNOSIS: end-stage, refractory ischemic cardiomyopathy  POSTOPERTAIVE DIAGNOSIS: Same    PRIMARY PROCEDURE: Implantation of HeartMate 3 left ventricular assist device    SURGEON: Michael Mulvihill, MD    ASSISTANT(S): Julien Toribio MD    ANESTHESIA: General endotracheal    DRAINS: L pleural, 2x straight mediastinal drains    BLOOD LOSS: 500mL    LVAD Parameters:   Speed at case conclusion: 5400 rpm  Flow at case conclusion: 3.9 lpm  Inotropes: dobutamine, epinephrine  Bend relief modification: yes, fenestrated    BRIEF CLINICAL HISTORY:  Duane C Johnson is a 74 year old male with ischemic cardiomyopathy and end-stage heart failure. Because of progressive functional deterioration and concerns for end organ dysfunction, the decision was made to offer the patient an implantable left ventricular assist device as destination therapy.    PROCEDURE IN DETAIL:  After informed consent was obtained from the patient and the family they were brought to the operating room. The patient had had preoperative counseling with a multidisciplinary team which included cardiac surgeon, cardiologist, , and VAD nurse coordinator. The patient and family understood the planned LVAD implantation procedure. They also were informed of the procedural risks which include death, stroke, infection, bleeding,andr enal and respiratory failure. Understanding these potential procedural risks, they desire to proceed with implantation of the left ventricular assist device in an effort to improve survival and quality of life.    The entire chest and both groin areas were prepped and draped in the usual sterile fashion. Prior to skin incision, the intravenous antibiotics were administered. Time out was performed prior to the procedure.     Selection for primary sternotomy    A standard median sternotomy was completed and the pericardium was opened.     A tunneling  device was utilized to fashion a tunnel to exit the skin in the left upper quadrant. The tunneling device was left within the tunnel and this was placed prior to systemic heparinization. Prior to manipulation of the aorta, epiaortic echo was completed. Images were personally reviewed and saved. There was no significant plaque at the sites of cannulation or outflow graft placement. The patient was fully heparinized and cannulation for cardiopulmonary bypass was established through direct aortic and right atrial cannulation. The patient was then put on cardiopulmonary bypass.     Four Ethibond pledgeted sutures were placed around the site for the left ventriculotomy. These sutures were brought through the HeartMate 3 sewing cuff and tied. A 3- 0 prolene suture was then run around the sewing cuff for hemostasis. Then a left ventriculotomy was performed with the HeartMate coring device. The outflow graft was then attached to the device and secured in place. The cannula was then placed into the sewing cuff and into the left ventricle. The cannula was then locked into place. The drive cord was tunneled out of the left upper quadrant.     As per our routine, the bend relief for the outflow graft was modified by constructing several fenestrations in-between the rigid rings.    Next, the outflow graft for the LVAD device was attached to the ascending aorta. A side-biting aortic clamp was placed and an aortotomy performed. The outflow graft was sewn to the ascending aorta with a running 4-0 Prolene suture.     The left ventricle and the LVAD pump were then de-aired. De-airing of the left ventricle and LVAD pump were performed with intraoperative MELBA guidance. Throughout the procedure, the field was flooded with carbon dioxide. The LVAD pump was initially activated at low pump speeds with care to have the heart filled by allowing volume to enter the circulation from the cardiopulmonary bypass circuit. The left ventricle and  blood pump were de-aired by placing a second vent in the ascending aorta and by placing a vent in the outflow graft of the blood pump. After all air had been dispersed as evidenced by the intraoperative MELBA, the patient was carefully weaned off of cardiopulmonary bypass.     After being weaned off of cardiopulmonary bypass, pump speeds were gradually increased with continued focus on the presence of any air in the left ventricle or ascending aorta. After completion of the de-airing process, the vent site in the outflow graft was removed and a suture was tied to ensure hemostasis. The patient remained hemodynamically stable and the cannulas for cardiopulmonary bypass removed and the purse string sutures were tied. Heparin was reversed with protamine. Mediastinal and pleural drains were placed. After hemostasis was achieved and the patient was hemodynamically stable, the sternum was closed with stainless steel wires per our routine. The sternotomy wound closure was performed in layers per our routine. A sterile dressing was then applied over the incision.     At the completion of the procedure all needle and lap counts were correct and a formal debriefing process was executed by the attending surgeon.    I was present and scrubbed for the entire procedure.    Michael Mulvihill, MD  9/19/2024 @ 12:48 AM

## 2024-09-19 NOTE — ANESTHESIA POSTPROCEDURE EVALUATION
Patient: Duane C Johnson    Procedure: Procedure(s):  Exploration of chest, chest washout       Anesthesia Type:  No value filed.    Note:  Disposition: ICU; Inpatient            ICU Sign Out: Anesthesiologist/ICU physician sign out WAS performed   Postop Pain Control:    PONV:    Neuro/Psych:             Sign Out: PLANNED postop sedation   Airway/Respiratory:             Sign Out: AIRWAY IN SITU/Resp. Support               Airway in situ/Resp. Support: ETT   CV/Hemodynamics: Uneventful            Sign Out: Acceptable CV status; No obvious hypovolemia; No obvious fluid overload   Other NRE: NONE   DID A NON-ROUTINE EVENT OCCUR? No           Last vitals:  Vitals:    09/18/24 2130 09/18/24 2142 09/18/24 2145   BP:      Pulse: 75 70 70   Resp:      Temp:   (!) 35.7  C (96.3  F)   SpO2: (!) 86% 100%        Electronically Signed By: Trey Frost MD  September 18, 2024  10:10 PM

## 2024-09-19 NOTE — PROGRESS NOTES
VAD Coordinator in OR throughout duration of chest washout surgery. VAD parameters were monitored in collaboration with anesthesia. Report given to 4A RN upon leaving the OR.     VAD parameters at START of surgery:  Speed: 5500 rpm  Flow: 3.6 lpm  Power: 3.6 jackson  PI (or flow waveform peak/trough for HW): 2.6   VAD parameters at END of surgery:  Speed: 5500 rpm  Flow: 3.6 lpm  Power: 3.7 jackson  PI (or flow waveform peak/trough for HW): 3.2  Speed adjustments made during OR: none  Flow range: 3.6-3.9 lpm  PI (or flow waveform peak/trough for HW) range: 1.9-4.4  Factors noted to cause a significant variation in VAD parameters: none  Other significant events: none    Please page the VAD Coordinator on-call with any VAD related questions (* * [phone], job code [id number] from an internal line).     Tamiko Mcconnell RN

## 2024-09-19 NOTE — PROGRESS NOTES
[facility]  Cardiology II Service / Advanced Heart Failure  Daily Progress Note    Patient: Duane C Johnson      : 1950      MRN: [id number]    Assessment/Plan:   Duane C Johnson is a 74 year old male pmhx of anterior STEMI s/p PCI to the pLAD on 10/26/23 with a VT/VF arrest the next day (10/27) with patent stents and unchanged anatomy on repeat angiogram. Placed on amiodarone and discharged 23, ICD was not indicated as this was within 48h of his MI. His EF prior to discharge was 20-30% with a large area of akinesis in the LAD, placed on apixaban due to this (Apixaban dcd on 24). Has had multiple admissions for HF exacerbation last year.  Admitted on 24. He presents for 2 weeks of worsening fatigue; found to have BNP of 16k and L pleural effusion. Admitted for diuresis and RHC. CPX and RHC showed significant functional limitations due to heart failue. Plan for DT VAD while inpatient. [place] placed on . S/p HM3 on .     # CAD s/p PCI to LAD  # h/o anterior STEMI on 10/26/23 with a VT/VF arrest the next day. Repeat angio showed patent stent on 10/27    # HFrEf 2/2 ICM (EF 15-20% by TTE 24) s/p HM3 on     # Afib DCCV on   # h/o VT/VF arrest in   # ICD placed on 24    [facility] [place], [place] # : CVP 7, PA 21/10, PCWP 5    Fluid status: Euvolemic  RV afterload: Earnestine 10>>5  Inotropes: , epi  Pressors: none  Anticoagulation: None, bleeding improved after OR washout yesterday  LDH: Stable    #The patient's HeartMate 3 LVAD  was interrogated:  Heartmate 3 LEFT VS  Flow (Lpm): 3.6 Lpm  Pulse Index (PI): (!) 1.6 PI  Speed (rpm): 5450 rpm  Power (jackson): 3.6 jackson  Current Hct settin   - The driveline exit site was inspected, c/d/i.   - All external components showed no evidence of damage or malfunction, none replaced.  - Alarms were reviewed, and notable for: low PI    Recommendations:  -Reduced LVAD speed to 5400  -Wean nitric to 5, continue  to wean further based on CVP, lactate and MvO2  -Continue dobutamine and epi at current doses while weaning Earnestine  -Anticoagulation per CT surgery, okay to hold aspirin for now  -Continue with amiodarone 200mg daily      Thank you for allowing me to care for this patient, please don't hesitate to contact me with any questions regarding this plan.     Pt was discussed and evaluated with Alonso Valle MD, attending physician, who agrees with the assessment and plan above.    09/19/2024  ==================================    Subjective:     HM3 placement     Objective:     Vitals:    09/16/24 0605 09/17/24 0600 09/19/24 0600   Weight: 64.5 kg (142 lb 3.2 oz) 64.9 kg (143 lb 1.3 oz) 69.4 kg (153 lb)     Vital Signs with Ranges  Temp:  [96.1  F (35.6  C)-99  F (37.2  C)] 97.5  F (36.4  C)  Pulse:  [70-92] 80  Resp:  [0-18] 12  MAP:  [47 mmHg-90 mmHg] 74 mmHg  Arterial Line BP: ()/(42-83) 79/59  FiO2 (%):  [40 %-60 %] 40 %  SpO2:  [86 %-100 %] 100 %  I/O last 3 completed shifts:  In: 51676.24 [I.V.:3976.24; Other:160; NG/GT:90; IV Piggyback:3050]  Out: 4922 [Urine:1810; Blood:500; Chest Tube:2612]    GENERAL: intubated sedated   HEENT: Atraumatic, moist mucus membranes, PERRL  NECK: R SGC  RESP: mechanical no wheezes/rhonchi/crackles.   CARDIO: Regular rate and rhythm. extremities warm and well perfused, no peripheral edema  ABD: Non-distended, non-tender, bowel sounds active  NEURO: sedated     Medications   Current Facility-Administered Medications   Medication Dose Route Frequency Provider Last Rate Last Admin    dextrose 10% infusion   Intravenous Continuous PRN [place] Malagon,         DOBUTamine (DOBUTREX) 500 mg in D5W 250 mL infusion (adult std conc)  2.5 mcg/kg/min (Dosing Weight) Intravenous Continuous [place] Malagon DO 4.9 mL/hr at 09/19/24 1100 2.5 mcg/kg/min at 09/19/24 1100    EPINEPHrine (ADRENALIN) 5 mg in  mL infusion  0.01-0.1 mcg/kg/min Intravenous Continuous Julien Toribio MD 11.7  mL/hr at 09/19/24 1100 0.06 mcg/kg/min at 09/19/24 1100    fentaNYL (SUBLIMAZE) infusion   mcg/hr Intravenous Continuous Malagon, [place], DO 1 mL/hr at 09/19/24 1100 50 mcg/hr at 09/19/24 1100    insulin regular (MYXREDLIN) 1 unit/mL infusion  0-24 Units/hr Intravenous Continuous Malagon, [place], DO 1 mL/hr at 09/19/24 1100 1 Units/hr at 09/19/24 1100    propofol (DIPRIVAN) infusion  5-75 mcg/kg/min (Dosing Weight) Intravenous Continuous Yessenia Peralta APRN CNP 19.5 mL/hr at 09/19/24 1100 50 mcg/kg/min at 09/19/24 1100    And    Medication Instruction   Does not apply Continuous PRN Yessenia Peralta APRN CNP        norepinephrine (LEVOPHED) 16 mg in  mL infusion MAX CONC CENTRAL LINE  0.01-0.15 mcg/kg/min Intravenous Continuous Julien Toribio MD   Stopped at 09/18/24 2057    Reason beta blocker order not selected   Does not apply DOES NOT GO TO Julien Hill MD        vasopressin 1 unit/mL MAX Conc (PITRESSIN) infusion  0.5-4 Units/hr Intravenous Continuous Yessenia Peralta APRN CNP   Stopped at 09/18/24 1845     Current Facility-Administered Medications   Medication Dose Route Frequency Provider Last Rate Last Admin    acetaminophen (TYLENOL) tablet 975 mg  975 mg Oral Q8H Julien Toribio MD   975 mg at 09/19/24 0908    amiodarone (PACERONE) tablet 200 mg  200 mg Oral or Feeding Tube Daily Collette Virgen APRN CNP        [Held by provider] aspirin (ASA) chewable tablet 81 mg  81 mg Oral or NG Tube Daily Juline Toribio MD        atorvastatin (LIPITOR) tablet 80 mg  80 mg Oral or Feeding Tube QPM Collette Virgen APRN CNP        escitalopram (LEXAPRO) tablet 10 mg  10 mg Oral or Feeding Tube Daily Collette Virgen APRN CNP        fluconazole (DIFLUCAN) intermittent infusion 200 mg  200 mg Intravenous Q24H Julien Toribio  mL/hr at 09/19/24 0744 200 mg at 09/19/24 0744    heparin ANTICOAGULANT injection 5,000 Units  5,000 Units Subcutaneous Q8H Julien Toribio MD        levofloxacin  (LEVAQUIN) infusion 500 mg  500 mg Intravenous Q24H Julien Toribio  mL/hr at 09/19/24 0744 500 mg at 09/19/24 0744    pantoprazole (PROTONIX) 2 mg/mL suspension 40 mg  40 mg Oral or NG Tube Daily Julien Toribio MD   40 mg at 09/19/24 0908    Or    pantoprazole (PROTONIX) EC tablet 40 mg  40 mg Oral Daily Julien Toribio MD        polyethylene glycol (MIRALAX) Packet 17 g  17 g Oral Daily Julien Toribio MD   17 g at 09/19/24 0908    rifampin (RIFADIN) 600 mg in sodium chloride 0.9 % 100 mL intermittent infusion  600 mg Intravenous Q24H Julien Toribio MD   600 mg at 09/19/24 0746    senna-docusate (SENOKOT-S/PERICOLACE) 8.6-50 MG per tablet 1 tablet  1 tablet Oral BID Julien Toribio MD   1 tablet at 09/19/24 0908    sodium chloride (PF) 0.9% PF flush 3 mL  3 mL Intracatheter Q8H Julien Toribio MD   3 mL at 09/18/24 2250    vancomycin (VANCOCIN) 1,000 mg in 200 mL dextrose intermittent infusion  1,000 mg Intravenous Q12H Julien Toribio  mL/hr at 09/19/24 0548 1,000 mg at 09/19/24 0548       Data   Recent Labs   Lab 09/19/24  0812 09/19/24  0700 09/19/24  0609 09/19/24  0301 09/19/24  0118 09/18/24  2223 09/18/24  2136 09/18/24  2134 09/18/24  2104 09/18/24  1548 09/18/24  1545 09/18/24  1451 09/18/24  1350 09/18/24  1347   WBC  --   --   --   --  10.4  --  9.8  --   --   --   --  18.3*  --  17.3*   HGB  --   --   --   --  9.8*  --  9.0*  --  9.4*   < >  --  7.7*  --  8.1*   MCV  --   --   --   --  87  --  89  --   --   --   --  94  --  95   PLT  --   --   --   --  113*  --  100*  --   --   --   --  129*  --  130*   INR  --   --   --   --  1.24*  --   --  1.36*  --   --  1.40* 1.45*  --  1.44*   NA  --   --   --   --  133*  133*  --   --  133* 132*   < > 131*  --   --  132*   POTASSIUM  --   --   --   --  4.6  4.6  --   --  3.7 3.6   < > 4.3  --   --  4.1   CHLORIDE  --   --   --   --  98  98  --   --  99  --   --  95*  --   --  97*   CO2  --   --   --   --  25  25  --   --  25  --   --  25  --   --  25   BUN  --    --   --   --  26.6*  26.6*  --   --  25.4*  --   --  29.0*  --   --  28.8*   CR  --   --   --   --  0.70  0.70  --   --  0.72  --   --  0.75  --   --  0.75   ANIONGAP  --   --   --   --  10  10  --   --  9  --   --  11  --   --  10   PRANAV  --   --   --   --  8.6*  8.6*  --   --  8.8  --   --  7.9*  --   --  8.0*   * 149* 149*   < > 131*  131*   < > 142* 145* 174*   < > 164*  --    < > 100*   ALBUMIN  --   --   --   --  2.9*  --   --   --   --   --   --   --   --  2.7*   PROTTOTAL  --   --   --   --  5.2*  --   --   --   --   --   --   --   --  4.9*   BILITOTAL  --   --   --   --  3.8*  3.9*  --   --   --   --   --   --   --   --  2.2*   ALKPHOS  --   --   --   --  64  --   --   --   --   --   --   --   --  77   ALT  --   --   --   --  30  --   --   --   --   --   --   --   --  36   AST  --   --   --   --  68*  --   --   --   --   --   --   --   --  71*    < > = values in this interval not displayed.     RHC 9/3/24    RA:   RV:39/9  PA:  PCWP:16/22/15    Pa Sat:48.2%  Goldy; CO/CI: 2.91/1.71  Thermodilution; CO/CI:3.53/2.08   Right sided filling pressures are normal.   Left sided filling pressures are mildly elevated. Mild elevated pulmonary hypertension.   Reduced cardiac output level.         Echocardiogram Complete   Result Value    LVEF  15-20% (severely reduced)    [facility]    [id number]  [facility]  [id number]  [id number]     [facility]  Echocardiography [facility]  [address] [place] [address]     Name: JOHNSON, DUANE C  MRN: [id number]  : 1950  Study Date: 2024 08:20 AM  Age: 74 yrs  Gender: Male  Patient Location: [facility]  Reason For Study: CHF  Ordering Physician: SONIA SCHMIDT  Performed By: Yessenia Cano RDCS     BSA: 1.7 m2  Height: 66 in  Weight: 145 lb  HR: 71  BP: 94/73 mmHg  ______________________________________________________________________________  Procedure  Complete Portable Echo Adult. Contrast  Optison. Optison (NDC #6758-6971-25)  given intravenously. Patient was given 6 ml mixture of 3 ml Optison and 6 ml  saline. 3 ml wasted.  ______________________________________________________________________________  Interpretation Summary  Left ventricular function is decreased. The ejection fraction is 15-20%  (severely reduced).  Moderate left ventricular dilation is present.  Apical wall akinesis is present.  Severe diffuse hypokinesis is present.  There is no thrombus seen in the left ventricle.  The right ventricle is normal size.  Global right ventricular function is normal.  Mild functional mitral insufficiency is present.  Mild to moderate tricuspid functional insufficiency is present.  Pulmonary hypertension is present.The right ventricular systolic pressure is  40mmHg above the right atrial pressure.  IVC diameter and respiratory changes fall into an intermediate range  suggesting an RA pressure of 8 mmHg.     This study was compared with the study from 2/19/2024 .MR, TR improved. LVEF  similar in both studies compared side to side. So overall no change in LVEF  ______________________________________________________________________________  Left Ventricle  Left ventricular wall thickness is normal. Moderate left ventricular dilation  is present. Left ventricular diastolic function is not assessable. Left  ventricular function is decreased. The ejection fraction is 15-20% (severely  reduced). Apical wall akinesis is present. Severe diffuse hypokinesis is  present. There is no thrombus seen in the left ventricle.     Right Ventricle  The right ventricle is normal size. Global right ventricular function is  normal. A pacemaker lead is noted in the right ventricle.     Atria  Moderate left atrial enlargement is present. Moderate right atrial enlargement  is present.     Mitral Valve  Mild mitral insufficiency is present.     Aortic Valve  Trileaflet aortic sclerosis without stenosis. On Doppler  interrogation, there  is no significant stenosis or regurgitation.     Tricuspid Valve  Mild to moderate tricuspid insufficiency is present. The right ventricular  systolic pressure is approximated at 39.7 mmHg plus the right atrial pressure.  Pulmonary hypertension is present. The right ventricular systolic pressure is  40mmHg above the right atrial pressure.     Pulmonic Valve  On Doppler interrogation, there is no significant stenosis or regurgitation.     Vessels  The aorta root is normal. IVC diameter and respiratory changes fall into an  intermediate range suggesting an RA pressure of 8 mmHg.     Pericardium  No pericardial effusion is present.     Compared to Previous Study  This study was compared with the study from 2024 . MR, TR improved. LVEF  similar in both studies compared side to side. So overall no change in LVEF.  ______________________________________________________________________________  MMode/2D Measurements & Calculations     IVSd: 0.89 cm  LVIDd: 6.0 cm  LVIDs: 5.5 cm  LVPWd: 0.79 cm  FS: 7.1 %  LV mass(C)d: 197.0 grams  LV mass(C)dI: 112.9 grams/m2  LVOT diam: 2.0 cm  LVOT area: 3.2 cm2  EF Biplane: 16.8 %  LA Volume (BP): 71.3 ml  LA Volume Index (BP): 41.0 ml/m2  RV Base: 4.6 cm  RWT: 0.27     TAPSE: 1.0 cm     Doppler Measurements & Calculations  MV E max ivan: 82.7 cm/sec  LV V1 max P.9 mmHg  LV V1 max: 84.2 cm/sec  LV V1 VTI: 13.8 cm  MR PISA: 4.2 cm2  MR ERO: 0.33 cm2  MR volume: 35.7 ml  SV(LVOT): 43.8 ml  SI(LVOT): 25.1 ml/m2  PA acc time: 0.08 sec  TR max ivan: 315.1 cm/sec  TR max P.7 mmHg  RV S Ivan: 10.7 cm/sec     ______________________________________________________________________________  Report approved by: Jeanine MEJIA 2024 11:05 AM            Examination: XR CHEST 2 VIEWS 2024 3:06 PM     Indication: Shortness of breath     Comparison: Radiograph 2024. CT 2023.     Findings:  PA and lateral views of the chest. Left chest wall  implantable cardiac  defibrillator with grossly intact leads/shock coil. Coronary stenting.  Trachea is midline. Stable mild cardiomegaly. Mild blunting of the  left costophrenic angle, likely representing small pleural effusion.  No pneumothorax. No focal consolidation or any definite abnormal  airspace opacities. No acute or suspicious osseous abnormality.  Unremarkable visualized abdomen.      Impression   Impression:   1. Stable mild cardiomegaly with implantable cardiac defibrillator and  coronary stenting.  2. Small left pleural effusion.     I have personally reviewed the examination and initial interpretation  and I agree with the findings.     PANCHITO EID MD         SYSTEM ID:  [id number]      12/6/2023 CMR  Clinical history: 73 year old with anterior STEMI, cardiac arrest in Oct 2023  Comparison CMR: none  1. The left ventricle is severely enlarged. There is wall thinning and akinesis of the mid-distal anterior,  mid anteroseptum, distal septum and the apex  The global systolic function is severely reduced. The LVEF is 28%.  2. The right ventricle is normal in cavity size. The global systolic function is normal. The RVEF is 52%.   3. The right atrium is normal and the left atrium is severely enlarged.  4. There is mild mitral regurgitation.   5. There is transmural late gadolinium enhancement in mid-distal anterior, mid anteroseptum, distal  septum and the apex consistent with a large infarction in the LAD territory.   6. There is no pericardial effusion.  7. There is no intracardiac thrombus.  8. There are bilateral pleural effusions.  CONCLUSIONS:   Ischemic cardiomyopathy with a large anteroapical infarction.   Severely reduced left ventricular function and normal right ventricular function, LVEF 28% and RVEF 52%.     Cardiac Catheterization:  10/26/23    1st Mrg lesion is 20% stenosed.    Prox LAD to Mid LAD lesion is 100% stenosed.    1st Diag-1 lesion is 80% stenosed.    1st Diag-2 lesion is 50%  stenosed.    Dist LAD lesion is 100% stenosed.    RPDA lesion is 70% stenosed.    Dist RCA lesion is 40% stenosed.     Anterior STEMI  Two vessel obstructive CAD (100% pLAD occlusion, 70% rPDA stenosis, 80% diagonal 1 stenosis)  PCI of pLAD to mLAD with BRENDA x 1 (Synergy 2.50x 28 mm) post dilated to 2.75 vessel  CVC placement in RIJ  LVEDP of 26 mmHg  Hemostasis of RRA with TR band      2/19/2024 Echo  Interpretation Summary     1. The left ventricle is moderately dilated. Left ventricular hypertrophy is  noted by two-dimensional echocardiography. Proximal septal thickening is  noted. Left ventricular systolic function is moderate to severely reduced. The  visual ejection fraction is 25-30%. Biplane LVEF is 25%. Diastolic Doppler  findings (E/E' ratio and/or other parameters) suggest left ventricular filling  pressures are increased. There is severe hypokinesis to akinesis of the mid  anterior/anterolateral/anteroseptal/inferoseptal walls and all apical segments  of the left ventricle. There is no thrombus seen in the left ventricle.  2. The right ventricle is normal size. The right ventricular systolic function  is normal.  3. The left atrium is moderately dilated. Right atrial size is normal. There  is no color Doppler evidence of an atrial shunt.  4. There is moderate to mod-severe (2-3+) mitral regurgitation.  5. There is mild to moderate (1-2+) tricuspid regurgitation.Right ventricular  systolic pressure is elevated, consistent with moderate pulmonary  hypertension.  6. No pericardial effusion.  7. In direct comparison to the previous study dated 10/30/2023, there has been  an interval increase in the degree of mitral regurgitation. The other findings  are similar.     RHC 5/6/24  RA: 10/9/6 mmHg  RV: 61/11 mmHg  PA: 63/24/38 mmHg  PCWP: Unable to obtain accurate measurement  CO/CI (Goldy): 3.89/2.3 L/min/m2    PA sat: 64%  MAP: 85 mmHg       Right sided filling pressures are normal.    Mild elevated pulmonary  hypertension.    Normal cardiac output level.        Device interrogated on 8/30/24  Device: Beecher Novariant D533 RESONATE HF ICD  Normal device function.  Mode: DDDR  bpm  AP: 33%  : 3%  Intrinsic rhythm: Afib w/ VS  bpm  Thoracic Impedance: Below reference line, suggests possible intrathoracic fluid accumulation.  Lead Trends Appear Stable.  Estimated battery longevity to RRT = 12.5 years.    Atrial Arrhythmia: AF episode began on 8/28/24  AF [place]: 11%  Anticoagulant: Eliquis  Ventricular Arrhythmia: None  Setting Changes: Rate response turned ON in device, pacing mode changed to DDDR from DDD, fallback rate during AT/AF episodes 70 bpm.

## 2024-09-19 NOTE — BRIEF OP NOTE
[facility]    Brief Operative Note    Pre-operative diagnosis: Bleeding [R58]  Post-operative diagnosis Same as pre-operative diagnosis    Procedure: Exploration of chest, chest washout, Right - Chest    Surgeon: Surgeons and Role:     * Mulvihill, Michael, MD - Primary     * Julien Toribio MD - Fellow - Assisting  Anesthesia: General   Estimated Blood Loss: 200 ml    Drains: None  Specimens: * No specimens in log *  Findings:   Significant clot evacuated, no obvious source of bleeding .  Complications: None.  Implants: * No implants in log *        Julien Toribio MD  Cardiothoracic Surgery Fellow  Pager: [phone]

## 2024-09-19 NOTE — OP NOTE
Cardiothoracic Surgery - OPERATIVE NOTE    Date of Surgery: 9/18/2024  Preop Diagnosis: Postoperative hemorrhage status post LVAD implant  Postop Diagnosis: same    Surgeon: Michael Mulvihill, MD  Assistant(s): Dr. Toribio    Procedures:  1. Mediastinal re-exploration for post-operative hemorrhage (CPT 51192)  2. Transesophageal echocardiography  3. Temporary sternal closure    Findings of Procedure: sternal wire and chest wall bleeding  Estimated Blood Loss: 200mL  Disposition: To ICU in stable but critical condition    Indication: Duane C Johnson is a 74 year old male with a history of ischemic cardiomyopathy who underwent implantation of a heartmate III LVAD. After arriving to the ICU he began to have high chest tube output. Despite resuscitation, the bleeding did not stop so the decision was made to take him back to the OR. This was done as a Level 1 (emergency) case so no informed consent was not obtained.    Procedure in Detail: The patient was placed supine on the operative table. His old dressings were removed and his chest was prepped and drapped in the standard sterile fashion. Surgical time-out was then completed.    The patient's median sternotomy incision was reopened and the sternal wires were removed. The sternal retractor was placed and the mediastinum was re-explored. There was a moderate amount of clotted blood.     All surgical sites were carefully examined. These were all hemostatic. Examination of the right chest wall wire sites revealed active bleeding. This was stopped with electrocautery and suture ligature.    The mediastinum was then irrigated with sterile saline. Again all surgical sites were examined and were found to be hemostatic. The mediastinal chest tubes were cleared and replaced in their original positions.     The sternum was reapproximated with steel sternal wires. The subcutaneous tissue was irrgated with sterile saline with bacitracin. The fascial and deep dermal layers were  closed with running absorbable suture. The skin was closed with absorbable suture. The skin was cleaned and dried and a sterile dressing was applied over the incision.    The patient tolerated the procedure wothout complication. He remained hemodynamically stable throughout.    The patient was transported to the ICU is stable but critical condition.    At the end of the case, the sponge, needle and instrument counts were all correct. A debriefing was held at the end of the case.    I was present and scrubbed for the entire procedure.    Michael Mulvihill, MD  9/19/2024 @ 12:53 AM

## 2024-09-19 NOTE — PLAN OF CARE
Major Shift Events:  pt returned from OR at 2145 post washout. No additional product given  TORO, follows commands, anxious/agitated with sedation weaned. PERRL 2mm. Sedation managed w/ prop and fent. V-paced . MAP goal 65-85 w/ Epi and dobutamine gtt. HM3 Speed 5500, Flow 4's, Power 3.8, PI 2's lowest 1.5. CVP 8, PA 23/10, SVO2 73, CI 3.5, . CMV 12/450/40%/5. Nitric at 10 overnight.   2L LR for decreased PI and CVP    Plan: Continue to trend labs and hemodynamics. Wean nitric as able  For vital signs and complete assessments, please see documentation flowsheets.

## 2024-09-19 NOTE — PROGRESS NOTES
Brief Progress Note     Interval summary note to document updates and changes to care plan/s. Please see daily progress note for full assessment and plan/s.     Interval history: Pt returned to ICU around 2115 s/p chest wash out. Clots evacuated in chest around chest tube sites. No signs of active bleeding.  Intra op given 1L crystalloid, finished 1 unit PRBCs and 1 unit FFP that were previously started in ICU before RTOR.  UOP 350cc.  Anesthesia placed additional PIV in left AC   Left pleural and mediastinal inocencia CT noted to have infrequent air leak - CVTS surgeons aware.    Plan:   - Check neuro then rest overnight  - Start Earnestine wean in AM     Dispo: CVICU     NADEGE Kelly CNP   09/18/2024  9:39 PM

## 2024-09-19 NOTE — PROGRESS NOTES
Major Shift Events:  Arrived from OR at 1315. Noted to be having significant chest tube output at that time with clots. CTICU NP started dripping tubes during handoff. Continued to strip tubes throughout shift due to large clots particularly in anterior mediastinal chest tubes. Having 170-460 mL/hr chest tube output throughout shift, CTICU team notified and aware. CBC and coags checked, administered 500 mL LR, 4 units PRBCs, 2 units FFP, and 1 unit platelets during shift per orders. Protamine over 4 hours ordered and started at 1700. Dobutamine continued at 2.5, norepi, epi, and vaso titrated to meet MAP goal > 65. Pt not tolerating turning to side due to hypotension. CVP 4-11 during shift. See flowsheets for LITZY numbers. V-paced/a-fib, HR 70-90's. LVAD with speed of 5400 from OR, increased to 5600, then decreased to 5500 per Cards 2 team. Flows 3.4 - 4.3, power 3.7, PI down form 2.6 from OR to 1.8 for most of shift, CTICU and Cards 2 teams aware. Sedation stopped to assess neuro status, pt moving all extremities, following commands, PERRL. Resumed propofol and fentanyl. Michael intact with 40-75 mL/hr urine output. Vent 40%/450/12/5. Nitric at 10, did not attempt to wean due to bleeding.    By end of shift, pt continuing to have > 200 mL/hr chest tube output with clots and pt starting to bleed around anterior mediastinal chest tubes. Dr. Mulvihill came to bedside, decision made to return to OR. Anesthesiology, RT, and LVAD coordinator came to bedside to bring pt down to OR.     Plan: Returned to OR for emergent washout at 1930.    For vital signs and complete assessments, please see documentation flowsheets.

## 2024-09-19 NOTE — PLAN OF CARE
Major Shift Events:  Significantly reduced CT o/p. 500cc LR bolus for persistently low PIs <2. TORO intermittently follows during sedation wean. Weaning Earnestine q2-3h as tolerated as evidenced by unchanged LA, SvO2. Last titration 3->2 w/reduced SvO2 74->64.     ADDENDUM: Per Cards 2 Attending, transient PI spikes likely 2/2 suction events. Dobutamine ordered off. Epi for MAP support, VAD speed to 5200  ml bolus LR, [place] Earnestine at 2 overnight.    Neuro:   TORO, follows commands, PERRL. Agitated/anxious when sedation is weaned    CV:   Rate/rhythm: DDD-paced 25% . Occ PVCs  MAP 65-85,   LITZY: CVP 8(Goal 8-10), PAPs 28/11, CO/CI 5.6/3.1, , SvO2 64  HM3 PIs 1.6-2.2 Speed 5200 Flow 3.4 Power 3.4    Pulm: CMV 12/450/40%/5. Nitric at 2. Titrating down q2-3h as tolerated    GI/: NPO, OG, Small bore-NG pre-pyloric. Trickle feed @ 15 ml/hr. 30 q4h FWF    Skin: midsternal    Drains: CTx4    Plan: Continue to monitor, wean Earnestine as tolerated.  For vital signs and complete assessments, please see documentation flowsheets. \  Goal Outcome Evaluation:      Plan of Care Reviewed With: patient, spouse    Overall Patient Progress: improvingOverall Patient Progress: improving    Outcome Evaluation: Decreased CT o/p, pressor needs. Tolerating Earnestine wean.

## 2024-09-19 NOTE — PROGRESS NOTES
CV ICU PROGRESS NOTE  September 19, 2024   Duane C Johnson  [id number]  Admitted: 8/30/2024  2:10 PM      ASSESSMENT: Duane C Johnson is a 74 year old male pmhx of CAD, STEMI s/p PCI to pLAD c/b VT/VF arrest in 2023, HFrEF 2/2 ICM (EF 10-15%), Afib s/p DDV, and s/p ICD placed 5/2024, PAC, HLD, prostate cancer, and anxiety who underwent LVAD placement (HeartMate 3) on 9/18/2024 by Dr. Mulvihill.    Today's Changes:  - Wean Earnestine to 5 ppm and then by 1 ppm as tolerated  - Resume amiodarone 200 mg and PTA atorvastatin 80 mg  - Radiology consult for NJ tube placement today  - Unable to pass NJ tube past stomach   - Trickle feeds via NG tube today per nutrition recs   - Reassess respiratory status tomorrow, pending possibility of extubating will consider repeat rads consult to place NJ  - Start SQH 5000 U q8h  - Resume PTA Lexapro 10 mg    PLAN:  Neuro/ pain/ sedation:  - Monitor neurological status. Notify the MD for any acute changes in exam.  - Per nursing, waking up moving extremities, intermittently following commands during sedation weans     #Acute Postoperative pain  - Pain: Scheduled tylenol. PRN tylenol, oxycodone  - Gtt: Fentanyl + boluses     #Sedation  - Sedation: propofol gtt + boluses  - RASS goal -1 to -2   -Wean sedation to goal   -Daily sedation vacation and neurologic assessment.    #Anxiety  - Resume PTA Lexapro 10 mg    Pulmonary care:   #Postoperative ventilation management  FiO2 (%): 40 %, Resp: 12, Vent Mode: CMV/AC, Resp Rate (Set): 12 breaths/min, Tidal Volume (Set, mL): 450 mL, PEEP (cm H2O): 5 cmH2O, Resp Rate (Set): 12 breaths/min, Tidal Volume (Set, mL): 450 mL, PEEP (cm H2O): 5 cmH2O   - Intubated, ventilated  - Defer PST until off inhaled agents.   - Titrate supplemental oxygen to maintain saturation above 92%.  - Earnestine for RV support. See cardiovascular plan below.  - Pulmonary hygiene: Incentive spirometer every 15- 30 minutes when awake, flutter valve, C&DB    Cardiovascular:    #S/p LVAD  placement (HeartMate 3) on 9/18/24 by Dr. Mulvihill  # Post-op cardiogenic shock  # Post-op vasoplegia  # History of CAD  # STEMI s/p PCI to LAD in 2023 c/b VT/VF arrest   # HFrEF 2/2 ICM (MELBA 09/05/2024 with LVEF 10-15%)  # S/p ICD 5/8/24  # A fib s/p cardioversion on 9/5/24, v-paced rhythm  # HLD  Per chart review, patient sustained anterior STEMI s/p PCI to the pLAD on 10/26/23 c/b VT/VF arrest the next day (10/27) with patent stents and unchanged anatomy on repeat angiogram. Placed on amiodarone and discharged 11/03/23, ICD was not indicated as this was within 48h of his MI. His EF prior do discharge was 20-30% with a large area of akinesis in the LAD initially placed on apixaban due to this but apixaban was stopped 7/5/24. Ultimately underwent ICD placement in 5/2024. S/p cardioversion on 9/5/24 but reverted back to AF on 9/7/24.  - Monitor hemodynamic status  - Goal MAP 65-85, CVP 8-10  - LVAD parameter changes: Decrease speed to 5400  - Epi, DB gtt for inotropic support, keep at current doses while weaning off Earnestine  - Weaned off levo and  overnight  - Inhaled nitric at 10 PPM (Started intra-op empirically for RV support), wean to 5 PPM and then wean by 1 as tolerated  - ICD device interrogation by EP nurse on 9/18  - Hold current admission meds: ASA 81 mg, captopril 6.25 mg tid, metoprolol, empagliflozin 10 mg  - Resume amiodarone 200 mg daily and PTA atorvastatin 80 mg every day  - Continue to hold ASA for today    GI care:   # Concern for protein-calorie malnutrition  - Nutrition consulted, appreciated recommendations  - Currently NPO   - Radiology consult for NJ placement    - Unable to pass NJ tube past stomach   - Trickle feeds via NG tube today per nutrition recs   - Reassess respiratory status tomorrow, pending possibility of extubating will consider repeat rads consult to place NJ  - PPI  - Bowel regimen: scheduled Miralax, senna    Renal/ Fluid Balance/ Electrolytes:   # Post-op lactic acidosis  #  DOMENICA, resolved  # Hyponatremia, resolving  BL creat appears to be ~ 1. DOMENICA during current admission to Cr 1.55 on 8/24/24 2/2 acute HF exacerbation, current Cr at 0.74  - Postoperative lactate of 2.9, improved to 1.3 s/p fluid resuscitation  - Has been fluid responsive, prn LR to maintain CVP goal >8  - Strict I/O, daily weights  - Avoid/limit nephrotoxins as able  - Replete lytes PRN per protocol  - Lactate checks q12h    # Hx prostate cancer sp Leuprolide and radiation therapy   - Completed Leuprolide on 6/11/24 and radiation therapy on 6/27/24. Last PSA was <0.01 on 7/29/24    Endocrine:    #Stress induced hyperglycemia  Preop A1c 4.8% on 3/13/23  - Insulin gtt  - Goal BG <180 for optimal healing    ID/ Antibiotics:  # Stress induced leukocytosis  # Prophylactic perioperative antibiotics  - To complete perioperative regimen -- fluconazole, levofloxacin, rifampin, vancomycin  - Continue to monitor fever curve, WBC and inflammatory markers as appropriate    Heme:     #Acute blood loss anemia  #Acute blood loss thrombocytopenia  #Stress induced leukocytosis  Patient has had significant chest tube output since surgery, approximately 800 ml serosanguinous to sanguinous output in 2-3 hours as well as clotting in four chest tubes requiring frequent stripping. Noted to have friable epicardium during surgery. Hgb decreased from 8.8 preoperatively to 7.7, platelets decreased from 220 to 129, INR 1.44. PTT increasing 46 --> 66. Epi increased to 0.07 and  to 2 for MAPs decreasing to 50s. Received 500 ml LR for CVP of 4.   - S/p 2 unit pRBCs,1 unit platelets, 2 units FFP on 9/18  - Protamine 100 over 4 hours per CVTS attending. Did receive 250 mg intra-op. Quantra, elevated PTT, with detected Xa indicates some heparin effect could still be present.  - CBC q12h   - Coag labs q12h  - Continue to monitor    MSK/ Skin:  # Sternotomy  # Surgical Incision  - Sternal precautions  - Postoperative incision management per protocol  -  PT/OT/CR    Prophylaxis:    - VTE: Mechanical. Start SQH today.  - PPI     Lines/ tubes/ drains:  - ETT  - RIJ CVC, PA catheter  - R Radial Arterial Line  - PIV L forearm  - CTs x4 (anterior pericardial, anterior mediastinal, mediastinal, pleural)  - Michael  - OG    Disposition:  - CVICU    Patient seen, findings and plan discussed with CVICU staff.    Nessa Tovar MS4  [employer] [employer]      Resident/Fellow Attestation   I, Jc Felder MD, was present with the medical/URIAH student who participated in the service and in the documentation of the note.  I have verified the history and personally performed the physical exam and medical decision making.  I agree with the assessment and plan of care as documented in the note.          Jc Felder MD  PGY3  Date of Service (when I saw the patient): 09/19/24      ====================================    TODAY'S PROGRESS  SUBJECTIVE  Postoperatively, was having high CT output and clotting in all 4 CT requiring frequent stripping. Despite resuscitation (S/p 2 unit pRBCs,1 unit platelets, 2 units FFP, Protamine 100 over 4 hours post op, 250 mg intraop), returned to OR for chest exploration/washout, which revealed moderate amount clotted blood but no significant site of active bleeding. Returned to the ICU with no additional acute events overnight. Patient visited at bedside this morning. Intubated and sedated, NAD. Per nursing, was moving all four extremities spontaneously and intermittently following commands during earlier sedation wean.    OBJECTIVE  1. VITAL SIGNS  Temp:  [96.1  F (35.6  C)-99  F (37.2  C)] 98.4  F (36.9  C)  Pulse:  [70-92] 87  Resp:  [6-16] 12  BP: (95)/(65) 95/65  MAP:  [47 mmHg-90 mmHg] 72 mmHg  Arterial Line BP: ()/(42-83) 76/59  FiO2 (%):  [40 %-60 %] 40 %  SpO2:  [86 %-100 %] 100 %  FiO2 (%): 40 %, Resp: 12, Vent Mode: CMV/AC, Resp Rate (Set): 12 breaths/min, Tidal Volume (Set, mL): 450 mL, PEEP (cm H2O): 5 cmH2O, Resp Rate (Set): 12  breaths/min, Tidal Volume (Set, mL): 450 mL, PEEP (cm H2O): 5 cmH2O    2. INTAKE/ OUTPUT  I/O last 3 completed shifts:  In: 7526.41 [I.V.:3504.41; Other:160; IV Piggyback:1050]  Out: 4655 [Urine:1975; Blood:500; Chest Tube:2180]    3. PHYSICAL EXAMINATION  General: Intubated and sedation, NAD.  Neuro: Sedated when visited at bedside, per nursing was moving all extremities spontaneously and intermittently following commands during earlier sedation wean.  Pulm: Intubated, ventilated (vent settings: RR 12, , PEEP 5, FiO2 60%), coarse breath sounds bilaterally. Ongoing Magdiel @ 10 ppm  CV: Permanent paced, mode VVI. High pitched motor sound of LVAD present. Ongoing dobutamine and epi gtt.   GI: Soft, non-distended, nontender to palpation. OG present.  Extremities: Warm, well-perfused, no LE edema, radial pulses 1+, unable to easily palpate DP but per nursing are dopplerable  Skin/incisions: surgical dressings in place, c/d/i, no sanguinous breakthrough  CT: x4 (anterior pericardial, anterior mediastinal, mediastinal, pleural), serosanguinous output, to suction @-20, no airleak    4. INVESTIGATIONS  Arterial Blood Gases   Recent Labs   Lab 09/19/24  0358 09/18/24  2344 09/18/24 2134 09/18/24  2104   PH 7.45 7.47* 7.48* 7.45   PCO2 40 41 36 38   PO2 229* 253* 380* 510*   HCO3 28 29* 27 26     Complete Blood Count   Recent Labs   Lab 09/19/24  0118 09/18/24 2136 09/18/24  2104 09/18/24  1959 09/18/24  1451 09/18/24  1347   WBC 10.4 9.8  --   --  18.3* 17.3*   HGB 9.8* 9.0* 9.4* 10.1* 7.7* 8.1*   * 100*  --   --  129* 130*     Basic Metabolic Panel  Recent Labs   Lab 09/19/24  0500 09/19/24  0359 09/19/24  0301 09/19/24  0118 09/18/24 2136 09/18/24 2134 09/18/24 2104 09/18/24 1959 09/18/24  1548 09/18/24  1545 09/18/24  1350 09/18/24  1347   NA  --   --   --  133*  133*  --  133* 132* 133*  --  131*  --  132*   POTASSIUM  --   --   --  4.6  4.6  --  3.7 3.6 4.1  --  4.3  --  4.1   CHLORIDE  --   --   --   "98  98  --  99  --   --   --  95*  --  97*   CO2  --   --   --  25  25  --  25  --   --   --  25  --  25   BUN  --   --   --  26.6*  26.6*  --  25.4*  --   --   --  29.0*  --  28.8*   CR  --   --   --  0.70  0.70  --  0.72  --   --   --  0.75  --  0.75   * 156* 148* 131*  131*   < > 145* 174* 141*   < > 164*   < > 100*    < > = values in this interval not displayed.     Liver Function Tests  Recent Labs   Lab 09/19/24 0118 09/18/24 2134 09/18/24  1545 09/18/24  1451 09/18/24  1347 09/18/24  1137 09/18/24  0344 09/17/24  0348   AST 68*  --   --   --  71*  --  44 29   ALT 30  --   --   --  36  --  40 34   ALKPHOS 64  --   --   --  77  --  106 87   BILITOTAL 3.8*  3.9*  --   --   --  2.2*  --  0.3 0.4   ALBUMIN 2.9*  --   --   --  2.7*  --  3.0* 3.0*   INR 1.24* 1.36* 1.40* 1.45* 1.44*   < > 1.24* 1.23*    < > = values in this interval not displayed.     Pancreatic Enzymes  No lab results found in last 7 days.  Coagulation Profile  Recent Labs   Lab 09/19/24 0118 09/18/24 2134 09/18/24  1545 09/18/24  1451   INR 1.24* 1.36* 1.40* 1.45*   PTT 36 41* 66* 58*     Lactate  Invalid input(s): \"LACTATE\"    5. RADIOLOGY  09/19/2024 @ 0542  CXR  IMPRESSION:  Right IJ [place] catheter terminates 2 cm past its right hilar  intersection, likely terminating over the junction of the right main  pulmonary artery and interlobar artery. Consider slight further  retraction. Otherwise stable chest    09/19/2024 @ 0540  CXR  IMPRESSION:  Repositioned right IJ [place] catheter terminates over the right  main pulmonary artery. Otherwise, stable chest.    09/19/2024 @ 0117  IMPRESSION:  1.  Right IJ [place] catheter terminates over the right interlobar  artery. Consider partial retraction.  2.  Otherwise stable post surgical chest    09/18/2024 @ 2224  CXR  Impression: Stable chest including support devices.     09/18/2024 @ 1347  CXR  IMPRESSION:   1. Postoperative changes in the chest with support devices " as  described, including right IJ central venous catheter tip near the  right interlobar pulmonary artery. Consider slight retraction.  2. Patchy perihilar and bibasilar opacities, likely edema and/or  atelectasis.

## 2024-09-19 NOTE — ANESTHESIA CARE TRANSFER NOTE
Patient: Duane C Johnson    Procedure: Procedure(s):  Exploration of chest, chest washout       Diagnosis: Bleeding [R58]  Diagnosis Additional Information: No value filed.    Anesthesia Type:   No value filed.     Note:    Oropharynx: endotracheal tube in place, bite block in place, oral gastic tube in place and ventilatory support  Level of Consciousness: iatrogenic sedation  Patient oxygen source: placed on vent by respiratory with Nitric.    Independent Airway: airway patency not satisfactory and stable  Dentition: dentition unchanged  Vital Signs Stable: post-procedure vital signs reviewed and stable  Report to RN Given: handoff report given  Patient transferred to: ICU    ICU Handoff: Call for PAUSE to initiate/utilize ICU HANDOFF, Identified Patient, Identified Responsible Provider, Reviewed the Pertinent Medical History, Discussed Surgical Course, Reviewed Intra-OP Anesthesia Management and Issues during Anesthesia, Set Expectations for Post Procedure Period and Allowed Opportunity for Questions and Acknowledgement of Understanding      Vitals:  Vitals Value Taken Time   BP     Temp 35.7  C (96.26  F) 09/18/24 2141   Pulse 70 09/18/24 2141   Resp     SpO2 88 % 09/18/24 2137   Vitals shown include unfiled device data.    Electronically Signed By: NADEGE Patel CRNA  September 18, 2024  9:41 PM

## 2024-09-19 NOTE — TREATMENT PLAN
Brief Cardiology Note   09/18/2024    Duane C Johnson MRN# [id number]   Age: 74 year old YOB: 1950     I restored his ICD settings to the original programming. Specifically I turned tachy-therapies back on and reverted to DDDR + mode switching AAIR -> VVI 60 - 130.      Maxwell Green MD  Cardiology Fellow

## 2024-09-19 NOTE — PROGRESS NOTES
CLINICAL NUTRITION SERVICES - REASSESSMENT NOTE     Nutrition Prescription    RECOMMENDATIONS FOR MDs/PROVIDERS:  Daily fluid adjustments per MD    Malnutrition Status:    Moderate malnutrition in the context of chronic illness    Recommendations already ordered by Registered Dietitian (RD):  -Initiate trophic EN via NGT: Osmolite 1.5 @ 15 ml/hr + 2 pkt Prosource TF20   - Initiate @ 15 ml/hr and do not advance   - Do not start or advance until lytes (Mg++,K+) WNL and phos>2.0  - Recommend 30 ml q4hr fluid flushes for tube patency. Additional fluids and/or adjustments per MD.    - Order multivitamin/mineral (15 ml/day via FT) to help ensure micronutrient needs being met with suspected hypermetabolic demands and potential interruptions to TF infusions.  - Elevated HOB with gastric feeds     Future/Additional Recommendations:  -Monitor TF tolerance/adequacy  -Monitor labs, stool output, weight trends  -Once FT in post-pyloric position, GOAL TF: Osmolite 1.5 Vance (or equivalent) @ goal of 45ml/hr (1080ml/day) + 2 pkt Prosource TF20 provides: 1780 kcals (29 kcal/kg), 107 g PRO (1.7 g/kg), 822 ml free H20, 219 g CHO, and 0 g fiber daily.     EVALUATION OF THE PROGRESS TOWARD GOALS   Diet: NPO  PO Intake: % PO intake per RN documentation. Typically ordering 3 meals per day with items such as mashed potatoes, tomato soup, turkey, corn, carrots, greek yogurt, scrambled eggs, Ensure, broccoli, ice cream, pasta, milk.     Calorie count data:  9/7         Total Kcals: 1160     Total Protein: 71g   9/8         Total Kcals: 564       Total Protein: 41g   9/9         Total Kcals: 1566     Total Protein: 95g   9/10       Total Kcals: 2420     Total Protein: 195g   9/11       Total Kcals: 731       Total Protein: 55g   9/12       Total Kcals: 450       Total Protein: 38g   9/13       Total Kcals: 1184     Total Protein: 57g   9/14       Total Kcals: 1352     Total Protein: 79g   9/15       Total Kcals: 1709     Total Protein:  151g   9/16       Total Kcals: 749       Total Protein: 26g     Pt's average intake x 1 week (9/9-16) was 1451 kcal/d and 99 g protein/d. This met 94% estimated energy needs and 100% estimated protein needs.      NEW FINDINGS   Overall: Pt s/p LVAD placement yesterday. Remains intubated.     GI: LBM 9/14. On scheduled miralax daily and senna BID.     Skin: Intact    Labs: Reviewed - hyponatremia, elevated BUN/Cr    Medications: Reviewed - epi @ 0.06, propofol @ 19.5 ml/hr (515 kcal/d)    Weights: No weight loss over the past week.  09/19/24 0600 69.4 kg (153 lb)   09/17/24 0600 64.9 kg (143 lb 1.3 oz)   09/16/24 0605 64.5 kg (142 lb 3.2 oz)   09/16/24 0553 64.9 kg (143 lb 1.3 oz)   09/15/24 0500 66 kg (145 lb 6.4 oz)   09/14/24 0114 66.5 kg (146 lb 8 oz)   09/13/24 0600 65.3 kg (144 lb)   09/12/24 0435 63 kg (138 lb 14.4 oz)     UPDATED NUTRITION NEEDS  Estimated Protein Needs:  grams protein/day (1.5 - 2 grams of pro/kg)     MALNUTRITION  % Intake: Decreased intake does not meet criteria  % Weight Loss: None noted  Subcutaneous Fat Loss: Upper arm:  mild and Lower arm:  mild  Muscle Loss: Temporal:  severe, Thoracic region (clavicle, acromium bone, deltoid, trapezius, pectoral):  moderate, Upper arm (bicep, tricep):  moderate, Lower arm  (forearm):  mild, Upper leg (quadricep, hamstring):  moderate, and Posterior calf:  severe  Fluid Accumulation/Edema: None noted  Malnutrition Diagnosis: Moderate malnutrition in the context of chronic illness    Previous Goals   Patient to consume % of nutritionally adequate meal trays TID, or the equivalent with supplements/snacks   Evaluation: goal occasionally met    Previous Nutrition Diagnosis  Malnutrition related to variable po intake as evidenced by NFPE   Evaluation: No longer applicable, nutrition diagnosis changed below    CURRENT NUTRITION DIAGNOSIS  Inadequate oral intake related to intubation as evidenced by NPO status and need for EN to meet nutrition  "needs at this time.       INTERVENTIONS  Implementation  --Enteral Nutrition - Initiate trophic EN  --Multivitamin/mineral supplement therapy  --Received provider consult for nutrition education with comments post op cardiovascular surgery (automatic consult on post-op order set). S/p LVAD on 9/18. Nutrition education to be completed as able/appropriate (as pt s/p CABG and/or initial VAD).    Goals  Total avg nutritional intake to meet a minimum of 25 kcal/kg and 1.5 g PRO/kg daily (per dosing wt 62 kg).    Monitoring/Evaluation  Progress toward goals will be monitored and evaluated per protocol.    Holly Davidson, MS, RD, LD  Available on Vocera - \"4A Clinical Dietitian\"  Weekend/Holiday RD - \"Weekend Clinical Dietitian\"  "

## 2024-09-19 NOTE — TREATMENT PLAN
Brief Cardiology Note   09/18/2024    Duane C Johnson MRN# [id number]   Age: 74 year old YOB: 1950     I was asked to re-program the [employer] dual chamber ICD for Duane Johnson in anticipation of going down to OR. He has previously demonstrated some degree of pacemaker dependence and his presenting rhythm was Atrial Fibrillation with a combination of Vs and  beats in the high 70s low 80s. At the request of the anesthesiologist attending I programmed the device to VVI @ 75bpm and disabled tachy therapies (monitor only).       Maxwell Green MD  Cardiology Fellow

## 2024-09-19 NOTE — PHARMACY-CONSULT NOTE
Pharmacy Tube Feeding Consult    Medication reviewed for administration by feeding tube and for potential food/drug interactions.    Recommendation: No changes are needed at this time.     Pharmacy will continue to follow as new medications are ordered    Juan Diego Berrios RPH

## 2024-09-20 ENCOUNTER — APPOINTMENT (OUTPATIENT)
Dept: GENERAL RADIOLOGY | Facility: CLINIC | Age: 74
DRG: 001 | End: 2024-09-20
Attending: INTERNAL MEDICINE
Payer: MEDICARE

## 2024-09-20 ENCOUNTER — APPOINTMENT (OUTPATIENT)
Dept: CARDIOLOGY | Facility: CLINIC | Age: 74
DRG: 001 | End: 2024-09-20
Attending: STUDENT IN AN ORGANIZED HEALTH CARE EDUCATION/TRAINING PROGRAM
Payer: MEDICARE

## 2024-09-20 ENCOUNTER — APPOINTMENT (OUTPATIENT)
Dept: GENERAL RADIOLOGY | Facility: CLINIC | Age: 74
DRG: 001 | End: 2024-09-20
Payer: MEDICARE

## 2024-09-20 ENCOUNTER — DOCUMENTATION ONLY (OUTPATIENT)
Dept: ANTICOAGULATION | Facility: CLINIC | Age: 74
End: 2024-09-20
Payer: MEDICARE

## 2024-09-20 DIAGNOSIS — Z95.811 LVAD (LEFT VENTRICULAR ASSIST DEVICE) PRESENT (H): ICD-10-CM

## 2024-09-20 DIAGNOSIS — Z79.01 ANTICOAGULATED ON WARFARIN: ICD-10-CM

## 2024-09-20 DIAGNOSIS — I50.22 CHRONIC SYSTOLIC CONGESTIVE HEART FAILURE (H): Primary | ICD-10-CM

## 2024-09-20 LAB
ALBUMIN SERPL BCG-MCNC: 2.4 G/DL (ref 3.5–5.2)
ALLEN'S TEST: ABNORMAL
ALLEN'S TEST: ABNORMAL
ALP SERPL-CCNC: 62 U/L (ref 40–150)
ALT SERPL W P-5'-P-CCNC: 24 U/L (ref 0–70)
ANION GAP SERPL CALCULATED.3IONS-SCNC: 7 MMOL/L (ref 7–15)
ANION GAP SERPL CALCULATED.3IONS-SCNC: 7 MMOL/L (ref 7–15)
APTT PPP: 37 SECONDS (ref 22–38)
AST SERPL W P-5'-P-CCNC: 55 U/L (ref 0–45)
BASE EXCESS BLDA CALC-SCNC: 4.2 MMOL/L (ref -3–3)
BASE EXCESS BLDA CALC-SCNC: 4.3 MMOL/L (ref -3–3)
BASE EXCESS BLDV CALC-SCNC: 2.9 MMOL/L (ref -3–3)
BASE EXCESS BLDV CALC-SCNC: 3.5 MMOL/L (ref -3–3)
BASE EXCESS BLDV CALC-SCNC: 4 MMOL/L (ref -3–3)
BASE EXCESS BLDV CALC-SCNC: 4.8 MMOL/L (ref -3–3)
BASE EXCESS BLDV CALC-SCNC: 4.8 MMOL/L (ref -3–3)
BASE EXCESS BLDV CALC-SCNC: 5.2 MMOL/L (ref -3–3)
BASE EXCESS BLDV CALC-SCNC: 5.4 MMOL/L (ref -3–3)
BILIRUB SERPL-MCNC: 1.4 MG/DL
BUN SERPL-MCNC: 18.6 MG/DL (ref 8–23)
BUN SERPL-MCNC: 18.6 MG/DL (ref 8–23)
CA-I BLD-MCNC: 4.6 MG/DL (ref 4.4–5.2)
CALCIUM SERPL-MCNC: 7.9 MG/DL (ref 8.8–10.4)
CALCIUM SERPL-MCNC: 7.9 MG/DL (ref 8.8–10.4)
CHLORIDE SERPL-SCNC: 99 MMOL/L (ref 98–107)
CHLORIDE SERPL-SCNC: 99 MMOL/L (ref 98–107)
COHGB MFR BLD: 99.8 % (ref 95–96)
COHGB MFR BLD: >100 % (ref 95–96)
CREAT SERPL-MCNC: 0.67 MG/DL (ref 0.67–1.17)
CREAT SERPL-MCNC: 0.67 MG/DL (ref 0.67–1.17)
EGFRCR SERPLBLD CKD-EPI 2021: >90 ML/MIN/1.73M2
EGFRCR SERPLBLD CKD-EPI 2021: >90 ML/MIN/1.73M2
ERYTHROCYTE [DISTWIDTH] IN BLOOD BY AUTOMATED COUNT: 17.7 % (ref 10–15)
ERYTHROCYTE [DISTWIDTH] IN BLOOD BY AUTOMATED COUNT: 18 % (ref 10–15)
FIBRINOGEN PPP-MCNC: 423 MG/DL (ref 170–510)
GLUCOSE BLDC GLUCOMTR-MCNC: 108 MG/DL (ref 70–99)
GLUCOSE BLDC GLUCOMTR-MCNC: 110 MG/DL (ref 70–99)
GLUCOSE BLDC GLUCOMTR-MCNC: 116 MG/DL (ref 70–99)
GLUCOSE BLDC GLUCOMTR-MCNC: 129 MG/DL (ref 70–99)
GLUCOSE BLDC GLUCOMTR-MCNC: 132 MG/DL (ref 70–99)
GLUCOSE BLDC GLUCOMTR-MCNC: 133 MG/DL (ref 70–99)
GLUCOSE BLDC GLUCOMTR-MCNC: 134 MG/DL (ref 70–99)
GLUCOSE BLDC GLUCOMTR-MCNC: 135 MG/DL (ref 70–99)
GLUCOSE BLDC GLUCOMTR-MCNC: 135 MG/DL (ref 70–99)
GLUCOSE BLDC GLUCOMTR-MCNC: 139 MG/DL (ref 70–99)
GLUCOSE BLDC GLUCOMTR-MCNC: 146 MG/DL (ref 70–99)
GLUCOSE BLDC GLUCOMTR-MCNC: 158 MG/DL (ref 70–99)
GLUCOSE SERPL-MCNC: 139 MG/DL (ref 70–99)
GLUCOSE SERPL-MCNC: 139 MG/DL (ref 70–99)
HCO3 BLD-SCNC: 28 MMOL/L (ref 21–28)
HCO3 BLD-SCNC: 28 MMOL/L (ref 21–28)
HCO3 BLDV-SCNC: 28 MMOL/L (ref 21–28)
HCO3 BLDV-SCNC: 29 MMOL/L (ref 21–28)
HCO3 BLDV-SCNC: 29 MMOL/L (ref 21–28)
HCO3 BLDV-SCNC: 30 MMOL/L (ref 21–28)
HCO3 SERPL-SCNC: 26 MMOL/L (ref 22–29)
HCO3 SERPL-SCNC: 26 MMOL/L (ref 22–29)
HCT VFR BLD AUTO: 24.2 % (ref 40–53)
HCT VFR BLD AUTO: 24.5 % (ref 40–53)
HGB BLD-MCNC: 8.2 G/DL (ref 13.3–17.7)
HGB BLD-MCNC: 8.3 G/DL (ref 13.3–17.7)
INR PPP: 1.32 (ref 0.85–1.15)
LACTATE SERPL-SCNC: 1 MMOL/L (ref 0.7–2)
LACTATE SERPL-SCNC: 1.2 MMOL/L (ref 0.7–2)
LACTATE SERPL-SCNC: 1.4 MMOL/L (ref 0.7–2)
LDH SERPL L TO P-CCNC: 348 U/L (ref 0–250)
LVEF ECHO: NORMAL
MAGNESIUM SERPL-MCNC: 2 MG/DL (ref 1.7–2.3)
MAGNESIUM SERPL-MCNC: 2.1 MG/DL (ref 1.7–2.3)
MCH RBC QN AUTO: 30.5 PG (ref 26.5–33)
MCH RBC QN AUTO: 30.8 PG (ref 26.5–33)
MCHC RBC AUTO-ENTMCNC: 33.9 G/DL (ref 31.5–36.5)
MCHC RBC AUTO-ENTMCNC: 33.9 G/DL (ref 31.5–36.5)
MCV RBC AUTO: 90 FL (ref 78–100)
MCV RBC AUTO: 91 FL (ref 78–100)
O2/TOTAL GAS SETTING VFR VENT: 30 %
O2/TOTAL GAS SETTING VFR VENT: 32 %
O2/TOTAL GAS SETTING VFR VENT: 40 %
OXYHGB MFR BLDV: 59 % (ref 70–75)
OXYHGB MFR BLDV: 65 % (ref 70–75)
OXYHGB MFR BLDV: 65 % (ref 70–75)
OXYHGB MFR BLDV: 66 % (ref 70–75)
OXYHGB MFR BLDV: 67 % (ref 70–75)
OXYHGB MFR BLDV: 69 % (ref 70–75)
OXYHGB MFR BLDV: 69 % (ref 70–75)
PATH REPORT.COMMENTS IMP SPEC: NORMAL
PATH REPORT.COMMENTS IMP SPEC: NORMAL
PATH REPORT.FINAL DX SPEC: NORMAL
PATH REPORT.GROSS SPEC: NORMAL
PATH REPORT.MICROSCOPIC SPEC OTHER STN: NORMAL
PATH REPORT.RELEVANT HX SPEC: NORMAL
PCO2 BLD: 38 MM HG (ref 35–45)
PCO2 BLD: 39 MM HG (ref 35–45)
PCO2 BLDV: 43 MM HG (ref 40–50)
PCO2 BLDV: 44 MM HG (ref 40–50)
PCO2 BLDV: 44 MM HG (ref 40–50)
PCO2 BLDV: 45 MM HG (ref 40–50)
PCO2 BLDV: 45 MM HG (ref 40–50)
PCO2 BLDV: 46 MM HG (ref 40–50)
PCO2 BLDV: 47 MM HG (ref 40–50)
PEEP: 5 CM H2O
PH BLD: 7.47 [PH] (ref 7.35–7.45)
PH BLD: 7.48 [PH] (ref 7.35–7.45)
PH BLDV: 7.39 [PH] (ref 7.32–7.43)
PH BLDV: 7.41 [PH] (ref 7.32–7.43)
PH BLDV: 7.41 [PH] (ref 7.32–7.43)
PH BLDV: 7.43 [PH] (ref 7.32–7.43)
PH BLDV: 7.43 [PH] (ref 7.32–7.43)
PH BLDV: 7.44 [PH] (ref 7.32–7.43)
PH BLDV: 7.45 [PH] (ref 7.32–7.43)
PHOSPHATE SERPL-MCNC: 3.2 MG/DL (ref 2.5–4.5)
PHOTO IMAGE: NORMAL
PLATELET # BLD AUTO: 107 10E3/UL (ref 150–450)
PLATELET # BLD AUTO: 113 10E3/UL (ref 150–450)
PO2 BLD: 124 MM HG (ref 80–105)
PO2 BLD: 136 MM HG (ref 80–105)
PO2 BLDV: 33 MM HG (ref 25–47)
PO2 BLDV: 35 MM HG (ref 25–47)
PO2 BLDV: 36 MM HG (ref 25–47)
PO2 BLDV: 37 MM HG (ref 25–47)
PO2 BLDV: 37 MM HG (ref 25–47)
POTASSIUM SERPL-SCNC: 4.5 MMOL/L (ref 3.4–5.3)
POTASSIUM SERPL-SCNC: 4.5 MMOL/L (ref 3.4–5.3)
PROT SERPL-MCNC: 4.7 G/DL (ref 6.4–8.3)
RBC # BLD AUTO: 2.66 10E6/UL (ref 4.4–5.9)
RBC # BLD AUTO: 2.72 10E6/UL (ref 4.4–5.9)
SAO2 % BLDA: 97 % (ref 92–100)
SAO2 % BLDA: 98 % (ref 92–100)
SAO2 % BLDV: 60.9 % (ref 70–75)
SAO2 % BLDV: 66.5 % (ref 70–75)
SAO2 % BLDV: 67.1 % (ref 70–75)
SAO2 % BLDV: 67.2 % (ref 70–75)
SAO2 % BLDV: 68.8 % (ref 70–75)
SAO2 % BLDV: 70.5 % (ref 70–75)
SAO2 % BLDV: 71.2 % (ref 70–75)
SODIUM SERPL-SCNC: 132 MMOL/L (ref 135–145)
SODIUM SERPL-SCNC: 132 MMOL/L (ref 135–145)
WBC # BLD AUTO: 10.2 10E3/UL (ref 4–11)
WBC # BLD AUTO: 9.4 10E3/UL (ref 4–11)

## 2024-09-20 PROCEDURE — 82805 BLOOD GASES W/O2 SATURATION: CPT | Performed by: STUDENT IN AN ORGANIZED HEALTH CARE EDUCATION/TRAINING PROGRAM

## 2024-09-20 PROCEDURE — 83735 ASSAY OF MAGNESIUM: CPT | Performed by: NURSE PRACTITIONER

## 2024-09-20 PROCEDURE — 250N000013 HC RX MED GY IP 250 OP 250 PS 637: Performed by: INTERNAL MEDICINE

## 2024-09-20 PROCEDURE — 93306 TTE W/DOPPLER COMPLETE: CPT

## 2024-09-20 PROCEDURE — 250N000013 HC RX MED GY IP 250 OP 250 PS 637: Performed by: SURGERY

## 2024-09-20 PROCEDURE — 94003 VENT MGMT INPAT SUBQ DAY: CPT

## 2024-09-20 PROCEDURE — 250N000011 HC RX IP 250 OP 636

## 2024-09-20 PROCEDURE — 99291 CRITICAL CARE FIRST HOUR: CPT | Mod: 25 | Performed by: INTERNAL MEDICINE

## 2024-09-20 PROCEDURE — 94799 UNLISTED PULMONARY SVC/PX: CPT

## 2024-09-20 PROCEDURE — 258N000003 HC RX IP 258 OP 636

## 2024-09-20 PROCEDURE — 999N000157 HC STATISTIC RCP TIME EA 10 MIN

## 2024-09-20 PROCEDURE — 71045 X-RAY EXAM CHEST 1 VIEW: CPT

## 2024-09-20 PROCEDURE — 250N000011 HC RX IP 250 OP 636: Performed by: SURGERY

## 2024-09-20 PROCEDURE — 85610 PROTHROMBIN TIME: CPT | Performed by: NURSE PRACTITIONER

## 2024-09-20 PROCEDURE — 200N000002 HC R&B ICU UMMC

## 2024-09-20 PROCEDURE — 85384 FIBRINOGEN ACTIVITY: CPT | Performed by: NURSE PRACTITIONER

## 2024-09-20 PROCEDURE — 999N000065 XR ABDOMEN PORT 1 VIEW

## 2024-09-20 PROCEDURE — 82805 BLOOD GASES W/O2 SATURATION: CPT

## 2024-09-20 PROCEDURE — 83615 LACTATE (LD) (LDH) ENZYME: CPT | Performed by: STUDENT IN AN ORGANIZED HEALTH CARE EDUCATION/TRAINING PROGRAM

## 2024-09-20 PROCEDURE — 74018 RADEX ABDOMEN 1 VIEW: CPT | Mod: 26 | Performed by: RADIOLOGY

## 2024-09-20 PROCEDURE — 93306 TTE W/DOPPLER COMPLETE: CPT | Mod: 26 | Performed by: INTERNAL MEDICINE

## 2024-09-20 PROCEDURE — 99291 CRITICAL CARE FIRST HOUR: CPT | Mod: FS | Performed by: STUDENT IN AN ORGANIZED HEALTH CARE EDUCATION/TRAINING PROGRAM

## 2024-09-20 PROCEDURE — 85027 COMPLETE CBC AUTOMATED: CPT

## 2024-09-20 PROCEDURE — 250N000013 HC RX MED GY IP 250 OP 250 PS 637

## 2024-09-20 PROCEDURE — 258N000003 HC RX IP 258 OP 636: Performed by: SURGERY

## 2024-09-20 PROCEDURE — 85730 THROMBOPLASTIN TIME PARTIAL: CPT | Performed by: NURSE PRACTITIONER

## 2024-09-20 PROCEDURE — 83605 ASSAY OF LACTIC ACID: CPT | Performed by: NURSE PRACTITIONER

## 2024-09-20 PROCEDURE — 71045 X-RAY EXAM CHEST 1 VIEW: CPT | Mod: 26 | Performed by: RADIOLOGY

## 2024-09-20 PROCEDURE — 80053 COMPREHEN METABOLIC PANEL: CPT

## 2024-09-20 PROCEDURE — 85027 COMPLETE CBC AUTOMATED: CPT | Performed by: NURSE PRACTITIONER

## 2024-09-20 PROCEDURE — 258N000003 HC RX IP 258 OP 636: Performed by: STUDENT IN AN ORGANIZED HEALTH CARE EDUCATION/TRAINING PROGRAM

## 2024-09-20 PROCEDURE — 250N000013 HC RX MED GY IP 250 OP 250 PS 637: Performed by: STUDENT IN AN ORGANIZED HEALTH CARE EDUCATION/TRAINING PROGRAM

## 2024-09-20 PROCEDURE — 250N000013 HC RX MED GY IP 250 OP 250 PS 637: Performed by: NURSE PRACTITIONER

## 2024-09-20 PROCEDURE — 93750 INTERROGATION VAD IN PERSON: CPT

## 2024-09-20 PROCEDURE — 82330 ASSAY OF CALCIUM: CPT | Performed by: SURGERY

## 2024-09-20 PROCEDURE — 84100 ASSAY OF PHOSPHORUS: CPT | Performed by: NURSE PRACTITIONER

## 2024-09-20 RX ORDER — AMOXICILLIN 250 MG
2 CAPSULE ORAL 2 TIMES DAILY
Status: DISCONTINUED | OUTPATIENT
Start: 2024-09-20 | End: 2024-09-22

## 2024-09-20 RX ORDER — ASPIRIN 81 MG/1
81 TABLET ORAL DAILY
Status: DISCONTINUED | OUTPATIENT
Start: 2024-09-20 | End: 2024-09-20

## 2024-09-20 RX ORDER — LIDOCAINE 40 MG/G
CREAM TOPICAL
OUTPATIENT
Start: 2024-09-20

## 2024-09-20 RX ORDER — MAGNESIUM SULFATE HEPTAHYDRATE 40 MG/ML
2 INJECTION, SOLUTION INTRAVENOUS ONCE
Status: COMPLETED | OUTPATIENT
Start: 2024-09-20 | End: 2024-09-20

## 2024-09-20 RX ORDER — CHLORHEXIDINE GLUCONATE ORAL RINSE 1.2 MG/ML
15 SOLUTION DENTAL 2 TIMES DAILY
Status: DISCONTINUED | OUTPATIENT
Start: 2024-09-20 | End: 2024-09-25

## 2024-09-20 RX ORDER — HEPARIN SODIUM 10000 [USP'U]/100ML
800 INJECTION, SOLUTION INTRAVENOUS CONTINUOUS
Status: DISCONTINUED | OUTPATIENT
Start: 2024-09-20 | End: 2024-09-21

## 2024-09-20 RX ORDER — POLYETHYLENE GLYCOL 3350 17 G/17G
17 POWDER, FOR SOLUTION ORAL 2 TIMES DAILY
Status: DISCONTINUED | OUTPATIENT
Start: 2024-09-20 | End: 2024-09-22

## 2024-09-20 RX ADMIN — POLYETHYLENE GLYCOL 3350 17 G: 17 POWDER, FOR SOLUTION ORAL at 08:00

## 2024-09-20 RX ADMIN — OXYCODONE HYDROCHLORIDE 5 MG: 5 TABLET ORAL at 17:43

## 2024-09-20 RX ADMIN — ACETAMINOPHEN 975 MG: 325 TABLET ORAL at 02:02

## 2024-09-20 RX ADMIN — MAGNESIUM SULFATE HEPTAHYDRATE 2 G: 2 INJECTION, SOLUTION INTRAVENOUS at 19:04

## 2024-09-20 RX ADMIN — Medication 60 ML: at 19:59

## 2024-09-20 RX ADMIN — CHLORHEXIDINE GLUCONATE 0.12% ORAL RINSE 15 ML: 1.2 LIQUID ORAL at 19:52

## 2024-09-20 RX ADMIN — SODIUM CHLORIDE 600 MG: 9 INJECTION, SOLUTION INTRAVENOUS at 06:15

## 2024-09-20 RX ADMIN — Medication 1 MG: at 22:34

## 2024-09-20 RX ADMIN — SENNOSIDES AND DOCUSATE SODIUM 2 TABLET: 8.6; 5 TABLET ORAL at 19:52

## 2024-09-20 RX ADMIN — CHLORHEXIDINE GLUCONATE 0.12% ORAL RINSE 15 ML: 1.2 LIQUID ORAL at 10:06

## 2024-09-20 RX ADMIN — SODIUM CHLORIDE, POTASSIUM CHLORIDE, SODIUM LACTATE AND CALCIUM CHLORIDE 500 ML: 600; 310; 30; 20 INJECTION, SOLUTION INTRAVENOUS at 04:59

## 2024-09-20 RX ADMIN — LEVOFLOXACIN 500 MG: 5 INJECTION, SOLUTION INTRAVENOUS at 05:07

## 2024-09-20 RX ADMIN — SENNOSIDES AND DOCUSATE SODIUM 1 TABLET: 8.6; 5 TABLET ORAL at 08:00

## 2024-09-20 RX ADMIN — GABAPENTIN 100 MG: 250 SUSPENSION ORAL at 23:27

## 2024-09-20 RX ADMIN — Medication 50 MCG/HR: at 11:31

## 2024-09-20 RX ADMIN — SODIUM CHLORIDE, POTASSIUM CHLORIDE, SODIUM LACTATE AND CALCIUM CHLORIDE 500 ML: 600; 310; 30; 20 INJECTION, SOLUTION INTRAVENOUS at 12:08

## 2024-09-20 RX ADMIN — ACETAMINOPHEN 975 MG: 325 TABLET ORAL at 17:42

## 2024-09-20 RX ADMIN — PANTOPRAZOLE SODIUM 40 MG: 40 TABLET, DELAYED RELEASE ORAL at 08:00

## 2024-09-20 RX ADMIN — VANCOMYCIN HYDROCHLORIDE 1000 MG: 1 INJECTION, SOLUTION INTRAVENOUS at 05:11

## 2024-09-20 RX ADMIN — THERA TABS 1 TABLET: TAB at 08:00

## 2024-09-20 RX ADMIN — ESCITALOPRAM OXALATE 10 MG: 10 TABLET ORAL at 08:00

## 2024-09-20 RX ADMIN — AMIODARONE HYDROCHLORIDE 200 MG: 200 TABLET ORAL at 08:00

## 2024-09-20 RX ADMIN — EPINEPHRINE 0.08 MCG/KG/MIN: 1 INJECTION INTRAMUSCULAR; INTRAVENOUS; SUBCUTANEOUS at 15:26

## 2024-09-20 RX ADMIN — ACETAMINOPHEN 975 MG: 325 TABLET ORAL at 10:06

## 2024-09-20 RX ADMIN — HEPARIN SODIUM 800 UNITS/HR: 10000 INJECTION, SOLUTION INTRAVENOUS at 10:06

## 2024-09-20 RX ADMIN — Medication 60 ML: at 08:00

## 2024-09-20 RX ADMIN — HEPARIN SODIUM 5000 UNITS: 5000 INJECTION, SOLUTION INTRAVENOUS; SUBCUTANEOUS at 03:55

## 2024-09-20 RX ADMIN — POLYETHYLENE GLYCOL 3350 17 G: 17 POWDER, FOR SOLUTION ORAL at 19:52

## 2024-09-20 RX ADMIN — ATORVASTATIN CALCIUM 80 MG: 80 TABLET, FILM COATED ORAL at 19:52

## 2024-09-20 RX ADMIN — PROPOFOL 40 MCG/KG/MIN: 10 INJECTION, EMULSION INTRAVENOUS at 03:55

## 2024-09-20 RX ADMIN — PROPOFOL 20 MCG/KG/MIN: 10 INJECTION, EMULSION INTRAVENOUS at 10:05

## 2024-09-20 RX ADMIN — FLUCONAZOLE IN SODIUM CHLORIDE 200 MG: 2 INJECTION, SOLUTION INTRAVENOUS at 08:00

## 2024-09-20 ASSESSMENT — ACTIVITIES OF DAILY LIVING (ADL)
ADLS_ACUITY_SCORE: 43
ADLS_ACUITY_SCORE: 39
ADLS_ACUITY_SCORE: 41
ADLS_ACUITY_SCORE: 43
ADLS_ACUITY_SCORE: 39
ADLS_ACUITY_SCORE: 43
ADLS_ACUITY_SCORE: 41
ADLS_ACUITY_SCORE: 43
ADLS_ACUITY_SCORE: 41
ADLS_ACUITY_SCORE: 35
ADLS_ACUITY_SCORE: 39
ADLS_ACUITY_SCORE: 41
ADLS_ACUITY_SCORE: 41
ADLS_ACUITY_SCORE: 43
ADLS_ACUITY_SCORE: 41
ADLS_ACUITY_SCORE: 43
ADLS_ACUITY_SCORE: 43

## 2024-09-20 NOTE — PROGRESS NOTES
"ANTICOAGULATION  MANAGEMENT: NEW REFERRAL      SUBJECTIVE/OBJECTIVE     Duane C Johnson, a 74 year old male  is newly referred to [facility].    Anticoagulation:    Previously on warfarin: No, new to anticoagulation  Warfarin initiation date (approximate): has not started yet   Indication(s): LVAD-Date VAD placed: 2024    Goal Range: 2.0-3.0   Anticoagulation Bridge/Overlap: No overlap ordered at this time  Referring provider:  from Cardiology     General Dietary/Social Hx:    Social History:   Social History     Tobacco Use    Smoking status: Former     Current packs/day: 0.00     Average packs/day: 0.3 packs/day for 30.0 years (7.5 ttl pk-yrs)     Types: Cigarettes     Start date: 10/26/1993     Quit date: 10/26/2023     Years since quittin.9     Passive exposure: Past    Smokeless tobacco: Never    Tobacco comments:     Quit 10/26/2023   Vaping Use    Vaping status: Never Used   Substance Use Topics    Alcohol use: Yes     Comment: 1-2 beers per day    Drug use: No     Results:        Recent labs: (last 7 days)     24  0335   INR 1.32*       Wt Readings from Last 2 Encounters:   24 71.1 kg (156 lb 12 oz)   24 64.4 kg (142 lb)      Estimated body mass index is 25.31 kg/m  as calculated from the following:    Height as of 9/3/24: 1.676 m (5' 5.98\").    Weight as of an earlier encounter on 24: 71.1 kg (156 lb 12 oz).  Lab Results   Component Value Date    AST 55 (H) 2024    ALT 24 2024    ALBUMIN 2.4 (L) 2024     Lab Results   Component Value Date    CR 0.67 2024    CR 0.67 2024     Estimated Creatinine Clearance: 97.3 mL/min (based on SCr of 0.67 mg/dL).    PLAN     New referral received. Patient currently inpatient. Standing INR labs ordered.   VAD comments added to Anticoag Episode details. [facility] will complete referral enrollment with ADT review after patient is discharged from the hospital.     Education provided:   NONE at " this time-still hospitalized     Education still needed:   ALL      No contact made at this time    Standing orders placed in Epic: Point of Care INR (Lab 5000) and Venous INR  (Lab 3572)    Plan made per [facility] anticoagulation protocol and per LVAD protocol    CANDY LOYOLA RN  Anticoagulation Clinic  9/20/2024

## 2024-09-20 NOTE — DISCHARGE INSTRUCTIONS
AFTER YOU GO HOME FROM YOUR HEART SURGERY  (HeartMate III LVAD Implant - 9/18/2024)    You had a sternotomy, avoid lifting anything greater than ten pounds for 8 weeks after surgery and then more than 20 pounds indefinitely.  Please try to sleep on your back for the first 4-6 weeks to avoid extra stress on your sternum (breastbone) while it is healing.   Do not reach backwards or use arms to push out of chair.   Do not let people pull on your arms to assist with standing.   Avoid twisting or reaching too far across your body.    Avoid strenuous activities such as bowling, vacuuming, raking, shoveling, golf or tennis for 12 weeks after your surgery.   Splint your chest incision by hugging a pillow or bringing your arms across your chest when coughing or sneezing.     No driving until you have been cleared to do so by your surgeon.    Shower or wash your incisions daily with soap and water (or as instructed), pat dry.   Keep wound clean and dry.  No showering until you have been cleared to do so by your VAD team and been given instructions on how to safely do this.  Cover chest tube sites with dry gauze until they stop draining, then leave open to air. It is not abnormal for chest tube sites to drain yellowish/clear fluid for up to 2-3 weeks after surgery.   Watch for signs of infection: fever (temperature greater than 100.5 degrees F), chills, increased redness, tenderness, warmth or any drainage that appears infected (pus like) or is persistent.  If you should develop any of these issues, call your VAD coordinator or primary care provider's office immediately.   Remove any skin glue left on incisions after 10-14 days. This will not affect your incision and can speed up healing.    Exercise is very important in your recovery. Please follow the guidelines set up for you in your cardiac rehab classes at the hospital. Outpatient cardiac rehab was ordered for you; we highly recommend you participate. If you have  problems arranging your cardiac rehab, please call [phone] for all locations, with the exception of [place], please call [phone] and [place], please call [phone].    Avoid sitting for prolonged periods of time, try to walk every hour during the day.     Check your weight when you get home from the hospital and continue to check it daily. If you notice a weight gain of 2-3 pounds in a week, notify your VAD team.    Bowel activity may be slow after surgery. If necessary, you may take an over the counter laxative such as milk of magnesia or Miralax. You may have stool softeners prescribed (docusate sodium, Senokot). We recommend using stool softeners while using narcotics for pain (oxycodone/percocet, hydrocodone/vicodin, hydromorphone/dilaudid).      Wean OFF of narcotics (oxycodone, hydromorphone, hydrocodone) as soon as possible. You should continue taking acetaminophen as long as you have any surgical pain as the first choice for pain control and add narcotics as necessary for pain to be tolerable.      DENTAL VISITS AFTER SURGERY  We do not recommend having dental work done for 6 months and you will need to take an antibiotic prior to dental visits from now on.  You can get a prescription for antibiotics from your VAD team and you should take it one hour prior to your dental visit, including routine cleanings.    DO NOT SMOKE.  IF YOU NEED HELP QUITTING, PLEASE TALK WITH YOUR CARDIOLOGIST OR PRIMARY DOCTOR.    You are on a blood thinner; follow the instructions as provided by the [facility] and your VAD team.    You have an LVAD device, so call your LVAD coordinator with all questions and concerns.  No swimming, showering, or baths. Daily sterile driveline dressing changes as instructed. You cannot have a MRI with your LVAD in place. No contact sports.     REGARDING PRESCRIPTION REFILLS.  All medications will be adjusted, discontinued and re-filled by your  primary care physician and/or your cardiologist as they were prior to your surgery. We have given you enough for one to three months.    POST-OPERATIVE CLINIC VISITS  You will return to the care of your primary provider and your cardiologist. You will have a surgical follow up with your surgeon.  Medication refills should come from your PCP or cardiologist.   You should see your primary care provider about 2 weeks after discharge.   It is important to see your cardiologist about 4 weeks after discharge.      SURGICAL QUESTIONS  Please call your LVAD Coordinator with surgical recovery and medication questions. They will assist you with your needs and contact other surgery care team members as indicated.

## 2024-09-20 NOTE — PROGRESS NOTES
D: Stopped by to see patient. Patient remains intubated/sedated.  I: patient not appropriate for education at this time. Discussed education plan with wife Melissa via telephone, provided support and listened to caregiver's thoughts and concerns.  P: Continue to follow patient and address any questions or concerns patient and or caregiver may have.      Indication of Interrogation:  Perioperative phase, eval hemodynamic fxn    Type of VAD:  Heartmate 3    Current Parameters:  Flow= 3.8 lpm, Speed= 5200 rpm, Power= 3.5 jackson, PI (if applicable)= 3.5    Abnormal Alarm on History:  No    Abnormal Events/Parameters Notes:  No, was having frequent PI events with speed drops, history back to 9/19 0945. Since speed change no PI events noted.    Changes Made during Interrogation:  No     Cristobal Laurent MD, PhD  Professor, Heart Failure and Cardiac Transplantation  [facility]

## 2024-09-20 NOTE — PLAN OF CARE
ICU End of Shift Summary    Major Events: Total of 1.5 liter LR bolus given for low PI and CVP of 8-9. OG tube removed. TORO with sedation weaning. Midsternal incision, LVAD, and chest tube dressings changed.     Neuro: PERRL. Follows commands, TORO.   CV: Pacemaker - DDDR. Irregular HR, occasional PVCs  Resp: CMV - RR 12, Tv 450, FiO2 40%, PEEP 5. Nitric at 2   GI: No BMs, hypoactive BS, senna given. Trickle feeding at 15 mL/hr via NGT.   : Michael patent, adequate to large UOP  Skin: Midline incision with liquid bandage, dressing changed. Chest tube x4, dressings changed.  Activity: A2, lift.   Lines: PIV x3. Right radial art line. R internal jugular with MAC  Drips: Currently at Prop @ 40, Fent @ 50, Epi @ 0.08    Plan: LITZY q4h. MAP>65. CVP goal 8-12. Wean nitric, work towards extubating.      *See flowsheets for vital signs and detailed assessment. Handoff report given to oncoming RN.

## 2024-09-20 NOTE — PROGRESS NOTES
[facility]  Cardiology II Service / Advanced Heart Failure  Daily Progress Note    Patient: Duane C Johnson      : 1950      MRN: [id number]    Assessment/Plan:   Duane C Johnson is a 74 year old male pmhx of anterior STEMI s/p PCI to the pLAD on 10/26/23 with a VT/VF arrest the next day (10/27) with patent stents and unchanged anatomy on repeat angiogram. Placed on amiodarone and discharged 23, ICD was not indicated as this was within 48h of his MI. His EF prior to discharge was 20-30% with a large area of akinesis in the LAD, placed on apixaban due to this (Apixaban dcd on 24). Has had multiple admissions for HF exacerbation last year.  Admitted on 24. He presents for 2 weeks of worsening fatigue; found to have BNP of 16k and L pleural effusion. Admitted for diuresis and RHC. CPX and RHC showed significant functional limitations due to heart failue. Plan for DT VAD while inpatient. [place] placed on . S/p HM3 on .     # CAD s/p PCI to LAD  # h/o anterior STEMI on 10/26/23 with a VT/VF arrest the next day. Repeat angio showed patent stent on 10/27    # HFrEf 2/2 ICM (EF 15-20% by TTE 24) s/p HM3 on     # Afib DCCV on   # h/o VT/VF arrest in   # ICD placed on 24    [facility] # : CVP 7, PA 21/10, PCWP 5, CO/CI 6.5/3/6             : CVP 9, PA 26/12, PCWP 7, CO/CI 6.6/3.6    Fluid status: Euvolemic  RV afterload: Earnestine 2, plan to wean today  Inotropes: epi 0.08  Pressors: none  Anticoagulation: straight rate heparin starting today  LDH: Stable    #The patient's HeartMate 3 LVAD  was interrogated:  Heartmate 3 LEFT VS  Flow (Lpm): 3.8 Lpm  Pulse Index (PI): 3.6 PI  Speed (rpm): 5200 rpm  Power (jackson): 3.4 jackson  Current Hct settin   - The driveline exit site was inspected, c/d/i.   - All external components showed no evidence of damage or malfunction, none replaced.  - Alarms were reviewed, and notable for: low  PI    Recommendations:  -Maintain LVAD speed at 5200, follow-up echo  -Wean off Earnestine, agree with extubation If tolerating pressure support  -Continue epi at current dose till extubation  -Straight rate heparin started today  -Continue with amiodarone 200mg daily      Thank you for allowing me to care for this patient, please don't hesitate to contact me with any questions regarding this plan.     Pt was discussed and evaluated with Dr. Laurent, attending physician, who agrees with the assessment and plan above.    09/20/2024  ==================================    Subjective:     HM3 placement     Objective:     Vitals:    09/17/24 0600 09/19/24 0600 09/20/24 0000   Weight: 64.9 kg (143 lb 1.3 oz) 69.4 kg (153 lb) 71.1 kg (156 lb 12 oz)     Vital Signs with Ranges  Temp:  [98.1  F (36.7  C)-99.5  F (37.5  C)] 99  F (37.2  C)  Pulse:  [69-88] 84  Resp:  [12-23] 21  MAP:  [64 mmHg-85 mmHg] 65 mmHg  Arterial Line BP: (70-94)/(53-71) 70/60  FiO2 (%):  [40 %-45 %] 40 %  SpO2:  [95 %-100 %] 99 %  I/O last 3 completed shifts:  In: 5611.14 [I.V.:2101.14; NG/GT:755; IV Piggyback:2500]  Out: 2416 [Urine:1868; Chest Tube:548]    GENERAL: intubated sedated   HEENT: Atraumatic, moist mucus membranes, PERRL  NECK: R SGC  RESP: mechanical no wheezes/rhonchi/crackles.   CARDIO: Regular rate and rhythm. extremities warm and well perfused, no peripheral edema  ABD: Non-distended, non-tender, bowel sounds active  NEURO: sedated     Medications   Current Facility-Administered Medications   Medication Dose Route Frequency Provider Last Rate Last Admin    dextrose 10% infusion   Intravenous Continuous PRN Indiana Malagon DO        EPINEPHrine (ADRENALIN) 5 mg in  mL infusion  0.01-0.1 mcg/kg/min Intravenous Continuous Julien Toribio MD 13.6 mL/hr at 09/20/24 1400 0.07 mcg/kg/min at 09/20/24 1400    fentaNYL (SUBLIMAZE) infusion   mcg/hr Intravenous Continuous Indiana Malagon DO 1 mL/hr at 09/20/24 1400 50 mcg/hr at 09/20/24 1400     heparin infusion 25,000 units in 0.45% NaCl 250 mL ANTICOAGULANT  800 Units/hr Intravenous Continuous Frederick Bledsoe APRN CNP 8 mL/hr at 09/20/24 1400 800 Units/hr at 09/20/24 1400    insulin regular (MYXREDLIN) 1 unit/mL infusion  0-24 Units/hr Intravenous Continuous Indiana Malagon DO 0.5 mL/hr at 09/20/24 1402 0.5 Units/hr at 09/20/24 1402    propofol (DIPRIVAN) infusion  5-75 mcg/kg/min (Dosing Weight) Intravenous Continuous Yessenia Peralta APRN CNP   Stopped at 09/20/24 1305    And    Medication Instruction   Does not apply Continuous PRN Yessenia Peralta APRN CNP        Reason beta blocker order not selected   Does not apply DOES NOT GO TO Julien Hill MD         Current Facility-Administered Medications   Medication Dose Route Frequency Provider Last Rate Last Admin    acetaminophen (TYLENOL) tablet 975 mg  975 mg Oral or Feeding Tube Q8H Alonso Valentine MD   975 mg at 09/20/24 1006    amiodarone (PACERONE) tablet 200 mg  200 mg Oral or Feeding Tube Daily Collette Virgen APRN CNP   200 mg at 09/20/24 0800    aspirin (ASA) chewable tablet 81 mg  81 mg Oral or NG Tube Daily Frederick Bledsoe APRN CNP        atorvastatin (LIPITOR) tablet 80 mg  80 mg Oral or Feeding Tube QPM Collette Virgen APRN CNP   80 mg at 09/19/24 2013    chlorhexidine (PERIDEX) 0.12 % solution 15 mL  15 mL Swish & Spit BID Frederick Bledsoe APRN CNP   15 mL at 09/20/24 1006    escitalopram (LEXAPRO) tablet 10 mg  10 mg Oral or Feeding Tube Daily Collette Virgen APRN CNP   10 mg at 09/20/24 0800    gabapentin (NEURONTIN) solution 100 mg  100 mg NG or Feeding tube At Bedtime Alonso Valentine MD        multivitamin, therapeutic (THERA-VIT) tablet 1 tablet  1 tablet Oral or Feeding Tube Daily Collette Virgen APRN CNP   1 tablet at 09/20/24 0800    pantoprazole (PROTONIX) 2 mg/mL suspension 40 mg  40 mg Oral or NG Tube Daily Julien Toribio MD   40 mg at 09/19/24 0908    Or    pantoprazole (PROTONIX) EC tablet 40 mg  40 mg Oral  Daily Julien Toribio MD   40 mg at 09/20/24 0800    polyethylene glycol (MIRALAX) Packet 17 g  17 g Oral or Feeding Tube BID Frederick Bledsoe APRN CNP        Prosource TF20 ENfit Compatibl EN LIQD (PROSOURCE TF20) packet 60 mL  1 packet Per Feeding Tube BID Collette iVrgen APRN CNP   60 mL at 09/20/24 0800    senna-docusate (SENOKOT-S/PERICOLACE) 8.6-50 MG per tablet 2 tablet  2 tablet Oral or Feeding Tube BID Frederick Bledsoe APRN CNP        sodium chloride (PF) 0.9% PF flush 3 mL  3 mL Intracatheter Q8H Julien Toribio MD   3 mL at 09/20/24 0515       Data   Recent Labs   Lab 09/20/24  1401 09/20/24  1246 09/20/24  1212 09/20/24  1027 09/20/24  1015 09/20/24  0344 09/20/24  0335 09/19/24  1809 09/19/24  1534 09/19/24  0301 09/19/24  0118   WBC  --   --   --   --  9.4  --  10.2  --  8.3  --  10.4   HGB  --   --   --   --  8.2*  --  8.3*  --  8.9*  --  9.8*   MCV  --   --   --   --  91  --  90  --  88  --  87   PLT  --   --   --   --  107*  --  113*  --  109*  --  113*   INR  --   --   --   --   --   --  1.32*  --  1.32*  --  1.24*   NA  --   --   --   --   --   --  132*  132*  --  131*  --  133*  133*   POTASSIUM  --   --   --   --   --   --  4.5  4.5  --  4.5  --  4.6  4.6   CHLORIDE  --   --   --   --   --   --  99  99  --  99  --  98  98   CO2  --   --   --   --   --   --  26  26  --  26  --  25  25   BUN  --   --   --   --   --   --  18.6  18.6  --  20.7  --  26.6*  26.6*   CR  --   --   --   --   --   --  0.67  0.67  --  0.65*  --  0.70  0.70   ANIONGAP  --   --   --   --   --   --  7  7  --  6*  --  10  10   PRANAV  --   --   --   --   --   --  7.9*  7.9*  --  8.0*  --  8.6*  8.6*   * 133* 158*   < >  --    < > 139*  139*   < > 147*   < > 131*  131*   ALBUMIN  --   --   --   --   --   --  2.4*  --   --   --  2.9*   PROTTOTAL  --   --   --   --   --   --  4.7*  --   --   --  5.2*   BILITOTAL  --   --   --   --   --   --  1.4*  --   --   --  3.8*  3.9*   ALKPHOS  --   --   --   --   --   --   62  --   --   --  64   ALT  --   --   --   --   --   --  24  --   --   --  30   AST  --   --   --   --   --   --  55*  --   --   --  68*    < > = values in this interval not displayed.     RHC 9/3/24    RA:   RV:39/9  PA:  PCWP:16/22/15    Pa Sat:48.2%  Goldy; CO/CI: 2.91/1.71  Thermodilution; CO/CI:3.53/2.08   Right sided filling pressures are normal.   Left sided filling pressures are mildly elevated. Mild elevated pulmonary hypertension.   Reduced cardiac output level.         Echocardiogram Complete   Result Value    LVEF  15-20% (severely reduced)    [facility]    [id number]  [id number]  [id number]  [id number]^ROXANA^SONIA     [facility],[place]  Echocardiography Laboratory  [address] [place] [id number]     Name: JOHNSON, DUANE C  MRN: [id number]  : 1950  Study Date: 2024 08:20 AM  Age: 74 yrs  Gender: Male  Patient Location: [facility]  Reason For Study: CHF  Ordering Physician: SONIA SCHMIDT  Performed By: Yessenia Cano RDCS     BSA: 1.7 m2  Height: 66 in  Weight: 145 lb  HR: 71  BP: 94/73 mmHg  ______________________________________________________________________________  Procedure  Complete Portable Echo Adult. Contrast Optison. Optison (NDC #3114-9393-33)  given intravenously. Patient was given 6 ml mixture of 3 ml Optison and 6 ml  saline. 3 ml wasted.  ______________________________________________________________________________  Interpretation Summary  Left ventricular function is decreased. The ejection fraction is 15-20%  (severely reduced).  Moderate left ventricular dilation is present.  Apical wall akinesis is present.  Severe diffuse hypokinesis is present.  There is no thrombus seen in the left ventricle.  The right ventricle is normal size.  Global right ventricular function is normal.  Mild functional mitral insufficiency is present.  Mild to moderate tricuspid functional insufficiency is present.  Pulmonary hypertension is present.The  right ventricular systolic pressure is  40mmHg above the right atrial pressure.  IVC diameter and respiratory changes fall into an intermediate range  suggesting an RA pressure of 8 mmHg.     This study was compared with the study from 2/19/2024 .MR, TR improved. LVEF  similar in both studies compared side to side. So overall no change in LVEF  ______________________________________________________________________________  Left Ventricle  Left ventricular wall thickness is normal. Moderate left ventricular dilation  is present. Left ventricular diastolic function is not assessable. Left  ventricular function is decreased. The ejection fraction is 15-20% (severely  reduced). Apical wall akinesis is present. Severe diffuse hypokinesis is  present. There is no thrombus seen in the left ventricle.     Right Ventricle  The right ventricle is normal size. Global right ventricular function is  normal. A pacemaker lead is noted in the right ventricle.     Atria  Moderate left atrial enlargement is present. Moderate right atrial enlargement  is present.     Mitral Valve  Mild mitral insufficiency is present.     Aortic Valve  Trileaflet aortic sclerosis without stenosis. On Doppler interrogation, there  is no significant stenosis or regurgitation.     Tricuspid Valve  Mild to moderate tricuspid insufficiency is present. The right ventricular  systolic pressure is approximated at 39.7 mmHg plus the right atrial pressure.  Pulmonary hypertension is present. The right ventricular systolic pressure is  40mmHg above the right atrial pressure.     Pulmonic Valve  On Doppler interrogation, there is no significant stenosis or regurgitation.     Vessels  The aorta root is normal. IVC diameter and respiratory changes fall into an  intermediate range suggesting an RA pressure of 8 mmHg.     Pericardium  No pericardial effusion is present.     Compared to Previous Study  This study was compared with the study from 2/19/2024 . MR, TR  improved. LVEF  similar in both studies compared side to side. So overall no change in LVEF.  ______________________________________________________________________________  MMode/2D Measurements & Calculations     IVSd: 0.89 cm  LVIDd: 6.0 cm  LVIDs: 5.5 cm  LVPWd: 0.79 cm  FS: 7.1 %  LV mass(C)d: 197.0 grams  LV mass(C)dI: 112.9 grams/m2  LVOT diam: 2.0 cm  LVOT area: 3.2 cm2  EF Biplane: 16.8 %  LA Volume (BP): 71.3 ml  LA Volume Index (BP): 41.0 ml/m2  RV Base: 4.6 cm  RWT: 0.27     TAPSE: 1.0 cm     Doppler Measurements & Calculations  MV E max ivan: 82.7 cm/sec  LV V1 max P.9 mmHg  LV V1 max: 84.2 cm/sec  LV V1 VTI: 13.8 cm  MR PISA: 4.2 cm2  MR ERO: 0.33 cm2  MR volume: 35.7 ml  SV(LVOT): 43.8 ml  SI(LVOT): 25.1 ml/m2  PA acc time: 0.08 sec  TR max ivan: 315.1 cm/sec  TR max P.7 mmHg  RV S Ivan: 10.7 cm/sec     ______________________________________________________________________________  Report approved by: Jeanine MEJIA 2024 11:05 AM            Examination: XR CHEST 2 VIEWS 2024 3:06 PM     Indication: Shortness of breath     Comparison: Radiograph 2024. CT 2023.     Findings:  PA and lateral views of the chest. Left chest wall implantable cardiac  defibrillator with grossly intact leads/shock coil. Coronary stenting.  Trachea is midline. Stable mild cardiomegaly. Mild blunting of the  left costophrenic angle, likely representing small pleural effusion.  No pneumothorax. No focal consolidation or any definite abnormal  airspace opacities. No acute or suspicious osseous abnormality.  Unremarkable visualized abdomen.      Impression   Impression:   1. Stable mild cardiomegaly with implantable cardiac defibrillator and  coronary stenting.  2. Small left pleural effusion.     I have personally reviewed the examination and initial interpretation  and I agree with the findings.     PANCHITO EID MD         SYSTEM ID:  [id number]      2023 CMR  Clinical history: 73 year old  with anterior STEMI, cardiac arrest in Oct 2023  Comparison CMR: none  1. The left ventricle is severely enlarged. There is wall thinning and akinesis of the mid-distal anterior,  mid anteroseptum, distal septum and the apex  The global systolic function is severely reduced. The LVEF is 28%.  2. The right ventricle is normal in cavity size. The global systolic function is normal. The RVEF is 52%.   3. The right atrium is normal and the left atrium is severely enlarged.  4. There is mild mitral regurgitation.   5. There is transmural late gadolinium enhancement in mid-distal anterior, mid anteroseptum, distal  septum and the apex consistent with a large infarction in the LAD territory.   6. There is no pericardial effusion.  7. There is no intracardiac thrombus.  8. There are bilateral pleural effusions.  CONCLUSIONS:   Ischemic cardiomyopathy with a large anteroapical infarction.   Severely reduced left ventricular function and normal right ventricular function, LVEF 28% and RVEF 52%.     Cardiac Catheterization:  10/26/23    1st Mrg lesion is 20% stenosed.    Prox LAD to Mid LAD lesion is 100% stenosed.    1st Diag-1 lesion is 80% stenosed.    1st Diag-2 lesion is 50% stenosed.    Dist LAD lesion is 100% stenosed.    RPDA lesion is 70% stenosed.    Dist RCA lesion is 40% stenosed.     Anterior STEMI  Two vessel obstructive CAD (100% pLAD occlusion, 70% rPDA stenosis, 80% diagonal 1 stenosis)  PCI of pLAD to mLAD with BRENDA x 1 (Synergy 2.50x 28 mm) post dilated to 2.75 vessel  CVC placement in RIJ  LVEDP of 26 mmHg  Hemostasis of RRA with TR band      2/19/2024 Echo  Interpretation Summary     1. The left ventricle is moderately dilated. Left ventricular hypertrophy is  noted by two-dimensional echocardiography. Proximal septal thickening is  noted. Left ventricular systolic function is moderate to severely reduced. The  visual ejection fraction is 25-30%. Biplane LVEF is 25%. Diastolic Doppler  findings (E/E' ratio  and/or other parameters) suggest left ventricular filling  pressures are increased. There is severe hypokinesis to akinesis of the mid  anterior/anterolateral/anteroseptal/inferoseptal walls and all apical segments  of the left ventricle. There is no thrombus seen in the left ventricle.  2. The right ventricle is normal size. The right ventricular systolic function  is normal.  3. The left atrium is moderately dilated. Right atrial size is normal. There  is no color Doppler evidence of an atrial shunt.  4. There is moderate to mod-severe (2-3+) mitral regurgitation.  5. There is mild to moderate (1-2+) tricuspid regurgitation.Right ventricular  systolic pressure is elevated, consistent with moderate pulmonary  hypertension.  6. No pericardial effusion.  7. In direct comparison to the previous study dated 10/30/2023, there has been  an interval increase in the degree of mitral regurgitation. The other findings  are similar.     RHC 5/6/24  RA: 10/9/6 mmHg  RV: 61/11 mmHg  PA: 63/24/38 mmHg  PCWP: Unable to obtain accurate measurement  CO/CI (Goldy): 3.89/2.3 L/min/m2    PA sat: 64%  MAP: 85 mmHg       Right sided filling pressures are normal.    Mild elevated pulmonary hypertension.    Normal cardiac output level.        Device interrogated on 8/30/24  Device: [employer] [id number] RESONATE HF ICD  Normal device function.  Mode: DDDR  bpm  AP: 33%  : 3%  Intrinsic rhythm: Afib w/ VS  bpm  Thoracic Impedance: Below reference line, suggests possible intrathoracic fluid accumulation.  Lead Trends Appear Stable.  Estimated battery longevity to RRT = 12.5 years.    Atrial Arrhythmia: AF episode began on 8/28/24  AF [place]: 11%  Anticoagulant: Eliquis  Ventricular Arrhythmia: None  Setting Changes: Rate response turned ON in device, pacing mode changed to DDDR from DDD, fallback rate during AT/AF episodes 70 bpm.    I have reviewed today's vital signs, notes, medications, labs and imaging.  I have also seen  and examined the patient and agree with the findings and plan as outlined above.   Pt with HM3 POD #2 currently extubated on epi for RV support.  CVP 7, PA 10, PCW 7 with CI 2.6 with LVAD speed decreased from 5400 to 5200.  Will have TTE for speed optomization and NG tube for TF.  Cr 0.67.  Pt progressing well with total critical care time 35 min.     Cristobal Laurent MD, PhD  Professor, Heart Failure and Cardiac Transplantation  [facility]

## 2024-09-20 NOTE — PLAN OF CARE
Major Shift Events:  Weaned off Earnestine, weaned epi to 0.05 (keep at this rate for inotropic support). Extubated at 1533 to 3 L NC.  AO to self and location. TORO. Passed bedside swallow, on soft diet.   500 ml of LR given for PI of 1.6 and CVP of 7 with improvement. CI >3 all day.     Plan: wean off epi, mobilize with therapy. Plan per CTICU.     For vital signs and complete assessments, please see documentation flowsheets.

## 2024-09-20 NOTE — PROGRESS NOTES
CV ICU PROGRESS NOTE  September 20, 2024   Duane C Johnson  [id number]  Admitted: 8/30/2024  2:10 PM      ASSESSMENT: Duane C Johnson is a 74 year old male pmhx of CAD, STEMI s/p PCI to pLAD c/b VT/VF arrest in 2023, HFrEF 2/2 ICM (EF 10-15%), Afib s/p DDV, and s/p ICD placed 5/2024, PAC, HLD, prostate cancer, and anxiety who underwent LVAD placement (HeartMate 3) on 9/18/2024 by Dr. Mulvihill.    Today's Changes:  - Start ASA  - Start straight-rate heparin at 800 U/hr   - TTE today  - Wean Earnestine as tolerated  - Wean epi as tolerated after Earnestine off  - PST with possible extubation this afternoon  - BS swallow if extubated vs repeat Rads consult for NJ tube placement  - Increase Miralax and senna to BID    PLAN:  Neuro/ pain/ sedation:  - Monitor neurological status. Notify the MD for any acute changes in exam.     #Acute Postoperative pain  - Pain: Gtt: Fentanyl + boluses. Scheduled tylenol, added Robaxin 750 mg TID, gabapentin 100 mg at bedtime on 9/19. PRN tylenol, oxycodone.     #Sedation  - Sedation: propofol gtt + boluses  - RASS goal -1 to 0  - Daily sedation vacation   - Neuro checks q2h    #Anxiety  - PTA Lexapro 10 mg    Pulmonary care:   #Postoperative ventilation management  FiO2 (%): 40 %, Resp: 12, Vent Mode: CMV/AC, Resp Rate (Set): 12 breaths/min, Tidal Volume (Set, mL): 450 mL, PEEP (cm H2O): 5 cmH2O, Resp Rate (Set): 12 breaths/min, Tidal Volume (Set, mL): 450 mL, PEEP (cm H2O): 5 cmH2O   - Intubated, ventilated  - Earnestine for RV support, started intraoperatively. See cardiovascular plan below.  - PST after Earnestine off, possible extubation this afternoon   - Titrate supplemental oxygen to maintain saturation above 92%.  - Pulmonary hygiene: Incentive spirometer every 15- 30 minutes when awake, flutter valve, C&DB  - Daily CXR    Cardiovascular:    #S/p LVAD placement (HeartMate 3) on 9/18/24 by Dr. Mulvihill  # Post-op cardiogenic shock  # Post-op vasoplegia  # History of CAD  # STEMI s/p PCI to LAD in 2023  c/b VT/VF arrest   # HFrEF 2/2 ICM (MELBA 09/05/2024 with LVEF 10-15%)  # S/p ICD 5/8/24  # A fib s/p cardioversion on 9/5/24, v-paced rhythm  # HLD  Per chart review, patient sustained anterior STEMI s/p PCI to the pLAD on 10/26/23 c/b VT/VF arrest the next day (10/27) with patent stents and unchanged anatomy on repeat angiogram. Placed on amiodarone and discharged 11/03/23, ICD was not indicated as this was within 48h of his MI. His EF prior do discharge was 20-30% with a large area of akinesis in the LAD initially placed on apixaban due to this but apixaban was stopped 7/5/24. Ultimately underwent ICD placement in 5/2024. S/p cardioversion on 9/5/24 but reverted back to AF on 9/7/24.  - Monitor hemodynamic status  - Goal MAP 65-85, CVP 8-10  - LVAD parameter changes: Speed decreased to 5200 9/19  - Epi gtt for inotropic support, DB gtt weaned off overnight  - Wean Earnestine as tolerated  - Keep epi gtt on until Earnestine off  - TTE today    - LVEF <20%, moderately dilated RV with moderately reduced function, see full interpretation in Imaging  - ICD device interrogation by EP nurse on 9/18  - Hold current admission meds: ASA 81 mg, captopril 6.25 mg tid, metoprolol, empagliflozin 10 mg  - Amiodarone 200 mg daily and PTA atorvastatin 80 mg every day  - Start ASA  - See Heme for anticoagulation plan    GI care:   # Concern for protein-calorie malnutrition  - Nutrition consulted, appreciated recommendations  - Currently NPO   - Currently receiving trickle feeds via NG tube today per nutrition recs   - Will consider repeat Rads consult to place NJ vs BS swallow if extubated  - PPI  - Bowel regimen: scheduled Miralax, senna increased to BID, dulcolax suppository prn    Renal/ Fluid Balance/ Electrolytes:   # Post-op lactic acidosis- resolved  # DOMENICA, resolved  # Hyponatremia, resolving  BL creat appears to be ~ 1. DOMENICA during current admission to Cr 1.55 on 8/24/24 2/2 acute HF exacerbation, current Cr at 0.67  - Has been fluid  responsive, prn LR to maintain CVP goal >8  - Strict I/O, daily weights  - Avoid/limit nephrotoxins as able  - Replete lytes PRN per protocol  - Lactate checks daily    # Hx prostate cancer sp Leuprolide and radiation therapy   - Completed Leuprolide on 6/11/24 and radiation therapy on 6/27/24. Last PSA was <0.01 on 7/29/24    Endocrine:    #Stress induced hyperglycemia  Preop A1c 4.8% on 3/13/23  - Insulin gtt  - Goal BG <180 for optimal healing    ID/ Antibiotics:  # Stress induced leukocytosis  # Prophylactic perioperative antibiotics  - To complete perioperative regimen -- fluconazole, levofloxacin, rifampin, vancomycin  - Continue to monitor fever curve, WBC and inflammatory markers as appropriate    Heme:     #Acute blood loss anemia  #Acute blood loss thrombocytopenia  #Stress induced leukocytosis  On 9/18, patient has had significant chest tube output after surgery, approximately 800 ml serosanguinous to sanguinous output in 2-3 hours as well as clotting in four chest tubes requiring frequent stripping. Noted to have friable epicardium during surgery. Hgb decreased from 8.8 preoperatively to 7.7, platelets decreased from 220 to 129, INR 1.44. PTT increasing 46 --> 66. Epi increased to 0.07 and  to 2 for MAPs decreasing to 50s. Received 500 ml LR for CVP of 4. Returned to OR, moderate amount of clotted blood found in chest but no significant location of active bleeding. Returned to the ICU with no additional acute events overnight.    - Received 2 unit pRBCs,1 unit platelets, 2 units FFP, Protamine 100 over 4 hours per CVTS attending as well as 250 mg intra-op. Quantra, elevated PTT, with detected Xa indicates some heparin effect could still be present on 9/18  - No s/sx active bleeding.  - Start straight rate heparin at 800 U/hr  - CBC daily  - Coag labs daily    MSK/ Skin:  # Sternotomy  # Surgical Incision  - Sternal precautions  - Postoperative incision management per protocol  -  PT/OT/CR    Prophylaxis:    - VTE: Mechanical. SQH.  - PPI     Lines/ tubes/ drains:  - ETT  - RIJ CVC, PA catheter  - R Radial Arterial Line  - PIVx2 L and R forearm  - CTs x4 (anterior pericardial, anterior mediastinal, mediastinal, pleural)  - Michael  - NG right nare    Disposition:  - CVICU    Patient seen, findings and plan discussed with CVICU staff.    Nessa Tovar MS4  [employer]    Physician Attestation   Duane was seen and evaluated by me on 09/20/24.  He was in critical condition as the result of LVAD heartmate 3 placement, undifferentiated shock, and mechanical ventilation need.  His condition is now improving.     Interval: Patient tolerating Earnestine @ 2 overnight well. Will plan to wean Earnestine to off today. Tentative plan to also CPAP/PS with extubation readiness evaluation. Cards II wanting to continue epinephrine infusion until Earnestine off and then evaluate vasopressor/inotrope need.      I have reviewed changes in critical data from the last 24 hours and agree with plan of care        I formulated and discussed my care plan with the patient s medical student in collaboration with the attending physician Dr. Clement     I discussed the course and plan with the Attending Physician, Dr. Clement  and answered all questions to the best of my ability.     Total Critical Care time spent by me, excluding procedures, was  40 minutes    NADEGE Vasquez CNP   ====================================    TODAY'S PROGRESS  SUBJECTIVE  Patient visited at bedside this morning. Intubated and sedated, NAD. Per nursing, is waking up and moving all extremities during sedation weans.     OBJECTIVE  1. VITAL SIGNS  Temp:  [97.5  F (36.4  C)-98.8  F (37.1  C)] 98.4  F (36.9  C)  Pulse:  [69-88] 72  Resp:  [0-19] 12  MAP:  [65 mmHg-85 mmHg] 70 mmHg  Arterial Line BP: (70-94)/(53-73) 75/57  FiO2 (%):  [40 %-45 %] 40 %  SpO2:  [95 %-100 %] 100 %  FiO2 (%): 40 %, Resp: 12, Vent Mode: CMV/AC, Resp Rate (Set): 12 breaths/min, Tidal  Volume (Set, mL): 450 mL, PEEP (cm H2O): 5 cmH2O, Resp Rate (Set): 12 breaths/min, Tidal Volume (Set, mL): 450 mL, PEEP (cm H2O): 5 cmH2O    2. INTAKE/ OUTPUT  I/O last 3 completed shifts:  In: 5822.42 [I.V.:1962.42; NG/GT:725; IV Piggyback:3000]  Out: 1896 [Urine:1048; Chest Tube:848]    3. PHYSICAL EXAMINATION  General: Intubated and sedation, NAD.  Neuro: Sedated when visited at bedside, per nursing was moving all extremities spontaneously and intermittently following commands during earlier sedation wean, R upper extremity strength possibly greater than L upper extremity but difficult to fully assess given unknown baseline. PERRL.  HEENT: ETT and NG tube in right nare. Atraumatic, sclera anicteric.  Pulm: Intubated, ventilated (vent settings: RR 12, , PEEP 5, FiO2 40%), coarse breath sounds bilaterally. Ongoing Magdiel @ 2 ppm  CV: Permanent paced, mode VVI. High pitched motor sound of LVAD present. Ongoing epi gtt.   GI: Soft, non-distended to palpation.   Extremities: Warm, well-perfused, no LE edema, radial pulses 1+, unable to easily palpate DP but per nursing are dopplerable  Skin/incisions: surgical dressings in place, c/d/i, no sanguinous breakthrough  CT: x4 (anterior pericardial, anterior mediastinal, mediastinal, pleural), serosanguinous output, to suction @-20, no airleak    4. INVESTIGATIONS  Arterial Blood Gases   Recent Labs   Lab 09/20/24  0335 09/19/24  2232 09/19/24  1534 09/19/24  1008   PH 7.48* 7.49* 7.47* 7.46*   PCO2 38 37 38 40   PO2 136* 173* 76* 147*   HCO3 28 28 28 29*     Complete Blood Count   Recent Labs   Lab 09/20/24  0335 09/19/24  1534 09/19/24  0118 09/18/24  2136   WBC 10.2 8.3 10.4 9.8   HGB 8.3* 8.9* 9.8* 9.0*   * 109* 113* 100*     Basic Metabolic Panel  Recent Labs   Lab 09/20/24  0344 09/20/24  0335 09/20/24  0006 09/19/24  2231 09/19/24  1809 09/19/24  1534 09/19/24  0301 09/19/24  0118 09/18/24 2136 09/18/24 2134   NA  --  132*  132*  --   --   --  131*  --   "133*  133*  --  133*   POTASSIUM  --  4.5  4.5  --   --   --  4.5  --  4.6  4.6  --  3.7   CHLORIDE  --  99  99  --   --   --  99  --  98  98  --  99   CO2  --  26  26  --   --   --  26  --  25  25  --  25   BUN  --  18.6  18.6  --   --   --  20.7  --  26.6*  26.6*  --  25.4*   CR  --  0.67  0.67  --   --   --  0.65*  --  0.70  0.70  --  0.72   * 139*  139* 135* 138*   < > 147*   < > 131*  131*   < > 145*    < > = values in this interval not displayed.     Liver Function Tests  Recent Labs   Lab 09/20/24 0335 09/19/24 1534 09/19/24 0118 09/18/24 2134 09/18/24  1451 09/18/24  1347 09/18/24  1137 09/18/24  0344   AST 55*  --  68*  --   --  71*  --  44   ALT 24  --  30  --   --  36  --  40   ALKPHOS 62  --  64  --   --  77  --  106   BILITOTAL 1.4*  --  3.8*  3.9*  --   --  2.2*  --  0.3   ALBUMIN 2.4*  --  2.9*  --   --  2.7*  --  3.0*   INR 1.32* 1.32* 1.24* 1.36*   < > 1.44*   < > 1.24*    < > = values in this interval not displayed.     Pancreatic Enzymes  No lab results found in last 7 days.  Coagulation Profile  Recent Labs   Lab 09/20/24 0335 09/19/24 1534 09/19/24  0118 09/18/24 2134   INR 1.32* 1.32* 1.24* 1.36*   PTT 37 37 36 41*     Lactate  Invalid input(s): \"LACTATE\"    5. RADIOLOGY  09/20/2024  LVAD Echocardiogram   Interpretation Summary  HeartMate 3 LVAD at 5200 RPM.  Normal LV size with severely reduced global LV function, LVEF<20%. LVIDd=4.5  cm.  Moderately dilated RV with moderately reduced function.  The ventricular septum is midline.  The aortic valve remains closed. There is no AI.  LVAD inflow cannula is visualized in the LV apex. LVAD outflow graft is  visualized in the aorta. LVAD inflow cannula velocities were not assessed.  Normal Doppler interrogation of the LVAD outflow graft.  This study was compared with the study from 9/18/24: No significant changes  noted.    09/20/2024  CXR  IMPRESSION:  1.  Stable dense retrocardiac atelectasis/consolidation.  2.  Stable " mild pulmonary edema, the left lung field obscured by  overlying devices.  3.  Probable contrast material over the fundus following feeding tube  placement 9/19/2024.    09/19/2024 @ 0542  CXR  IMPRESSION:  Right IJ Kent-Susy catheter terminates 2 cm past its right hilar  intersection, likely terminating over the junction of the right main  pulmonary artery and interlobar artery. Consider slight further  retraction. Otherwise stable chest    09/19/2024 @ 0540  CXR  IMPRESSION:  Repositioned right IJ Kent-Susy catheter terminates over the right  main pulmonary artery. Otherwise, stable chest.    09/19/2024 @ 0117  IMPRESSION:  1.  Right IJ Kent-Susy catheter terminates over the right interlobar  artery. Consider partial retraction.  2.  Otherwise stable post surgical chest

## 2024-09-20 NOTE — PROGRESS NOTES
Post-VAD Social Work Services Progress Note    Date of Initial Social Work Evaluation: 09/05/2024   Collaborated with: Patient's wife (Melissa)    Data:  Duane was evaluated for LVAD and is remaining inpatient until he receives his VAD implant. He continues to work with therapies and working towards increasing his nutrition prior to LVAD implant. Duane received his LVAD on 9/18/24. He remains intubated and sedated on the ICU post-implant.    Intervention: VAD SW attempted to see patient on ICU, though patient remains intubated and sedated following VAD implant.VAD SW called patient's wife, Melissa, for supportive phone call. Wife expressed feeling concerned about patient because she is unable to talk to him or ask how he is doing due to intubation. She expressed feeling ready for extubation to see how he is doing. She feels she has been receiving appropriate updates from the medical team and shared she has been calling every morning at about 9AM. She discussed planning to call tonight, between 4:30-5PM to get an update on potential extubation. SW called bedside nurse to update her on estimated time wife would be calling. Wife denied additional support or questions at this time.    Assessment: Wife was pleasant and engaged in phone call. She expressed appreciation for call. She identified plan to call bedside nurse between 4:30-5PM for medical update. No additional questions or concerns for SW at this time.  Education provided by SW: Ongoing SW availability, contact information  Plan:    Discharge Plans in Progress: [facility] vs Home    Barriers to d/c plan: Pt currently intubated; medical condition    Follow up Plan: SW to continue to follow for any potential psychosocial concerns pre and post VAD implant.     Jessi Almendarez, MSW, LGSW, APSW  [facility]   [facility]  Ph. [phone]

## 2024-09-21 ENCOUNTER — APPOINTMENT (OUTPATIENT)
Dept: GENERAL RADIOLOGY | Facility: CLINIC | Age: 74
DRG: 001 | End: 2024-09-21
Payer: MEDICARE

## 2024-09-21 ENCOUNTER — APPOINTMENT (OUTPATIENT)
Dept: OCCUPATIONAL THERAPY | Facility: CLINIC | Age: 74
DRG: 001 | End: 2024-09-21
Attending: SURGERY
Payer: MEDICARE

## 2024-09-21 ENCOUNTER — APPOINTMENT (OUTPATIENT)
Dept: PHYSICAL THERAPY | Facility: CLINIC | Age: 74
DRG: 001 | End: 2024-09-21
Attending: SURGERY
Payer: MEDICARE

## 2024-09-21 LAB
ALBUMIN SERPL BCG-MCNC: 2.3 G/DL (ref 3.5–5.2)
ALP SERPL-CCNC: 76 U/L (ref 40–150)
ALT SERPL W P-5'-P-CCNC: 21 U/L (ref 0–70)
ANION GAP SERPL CALCULATED.3IONS-SCNC: 9 MMOL/L (ref 7–15)
APTT PPP: 107 SECONDS (ref 22–38)
AST SERPL W P-5'-P-CCNC: 39 U/L (ref 0–45)
BASE EXCESS BLDV CALC-SCNC: 3.3 MMOL/L (ref -3–3)
BASE EXCESS BLDV CALC-SCNC: 5.1 MMOL/L (ref -3–3)
BILIRUB SERPL-MCNC: 1.2 MG/DL
BUN SERPL-MCNC: 21.3 MG/DL (ref 8–23)
CA-I BLD-MCNC: 4.4 MG/DL (ref 4.4–5.2)
CALCIUM SERPL-MCNC: 7.6 MG/DL (ref 8.8–10.4)
CHLORIDE SERPL-SCNC: 101 MMOL/L (ref 98–107)
CREAT SERPL-MCNC: 0.62 MG/DL (ref 0.67–1.17)
EGFRCR SERPLBLD CKD-EPI 2021: >90 ML/MIN/1.73M2
ERYTHROCYTE [DISTWIDTH] IN BLOOD BY AUTOMATED COUNT: 18 % (ref 10–15)
ERYTHROCYTE [DISTWIDTH] IN BLOOD BY AUTOMATED COUNT: 18.1 % (ref 10–15)
FIBRINOGEN PPP-MCNC: 525 MG/DL (ref 170–510)
GLUCOSE BLDC GLUCOMTR-MCNC: 122 MG/DL (ref 70–99)
GLUCOSE BLDC GLUCOMTR-MCNC: 127 MG/DL (ref 70–99)
GLUCOSE BLDC GLUCOMTR-MCNC: 132 MG/DL (ref 70–99)
GLUCOSE BLDC GLUCOMTR-MCNC: 135 MG/DL (ref 70–99)
GLUCOSE BLDC GLUCOMTR-MCNC: 153 MG/DL (ref 70–99)
GLUCOSE SERPL-MCNC: 134 MG/DL (ref 70–99)
HCO3 BLDV-SCNC: 28 MMOL/L (ref 21–28)
HCO3 BLDV-SCNC: 30 MMOL/L (ref 21–28)
HCO3 SERPL-SCNC: 24 MMOL/L (ref 22–29)
HCT VFR BLD AUTO: 24.4 % (ref 40–53)
HCT VFR BLD AUTO: 24.7 % (ref 40–53)
HGB BLD-MCNC: 8 G/DL (ref 13.3–17.7)
HGB BLD-MCNC: 8.2 G/DL (ref 13.3–17.7)
INR PPP: 1.25 (ref 0.85–1.15)
INR PPP: 1.28 (ref 0.85–1.15)
LACTATE SERPL-SCNC: 0.7 MMOL/L (ref 0.7–2)
MAGNESIUM SERPL-MCNC: 2.2 MG/DL (ref 1.7–2.3)
MAGNESIUM SERPL-MCNC: 2.2 MG/DL (ref 1.7–2.3)
MCH RBC QN AUTO: 30.8 PG (ref 26.5–33)
MCH RBC QN AUTO: 30.9 PG (ref 26.5–33)
MCHC RBC AUTO-ENTMCNC: 32.8 G/DL (ref 31.5–36.5)
MCHC RBC AUTO-ENTMCNC: 33.2 G/DL (ref 31.5–36.5)
MCV RBC AUTO: 93 FL (ref 78–100)
MCV RBC AUTO: 94 FL (ref 78–100)
O2/TOTAL GAS SETTING VFR VENT: 0 %
O2/TOTAL GAS SETTING VFR VENT: 21 %
OXYHGB MFR BLDV: 53 % (ref 70–75)
OXYHGB MFR BLDV: 55 % (ref 70–75)
PCO2 BLDV: 45 MM HG (ref 40–50)
PCO2 BLDV: 45 MM HG (ref 40–50)
PH BLDV: 7.41 [PH] (ref 7.32–7.43)
PH BLDV: 7.43 [PH] (ref 7.32–7.43)
PHOSPHATE SERPL-MCNC: 3 MG/DL (ref 2.5–4.5)
PLATELET # BLD AUTO: 110 10E3/UL (ref 150–450)
PLATELET # BLD AUTO: 113 10E3/UL (ref 150–450)
PO2 BLDV: 29 MM HG (ref 25–47)
PO2 BLDV: 29 MM HG (ref 25–47)
POTASSIUM SERPL-SCNC: 4.1 MMOL/L (ref 3.4–5.3)
PROT SERPL-MCNC: 4.8 G/DL (ref 6.4–8.3)
RBC # BLD AUTO: 2.6 10E6/UL (ref 4.4–5.9)
RBC # BLD AUTO: 2.65 10E6/UL (ref 4.4–5.9)
SAO2 % BLDV: 54.4 % (ref 70–75)
SAO2 % BLDV: 56.8 % (ref 70–75)
SODIUM SERPL-SCNC: 134 MMOL/L (ref 135–145)
UFH PPP CHRO-ACNC: 0.17 IU/ML
UFH PPP CHRO-ACNC: 0.25 IU/ML
WBC # BLD AUTO: 10.3 10E3/UL (ref 4–11)
WBC # BLD AUTO: 9.8 10E3/UL (ref 4–11)

## 2024-09-21 PROCEDURE — 250N000013 HC RX MED GY IP 250 OP 250 PS 637

## 2024-09-21 PROCEDURE — 85730 THROMBOPLASTIN TIME PARTIAL: CPT

## 2024-09-21 PROCEDURE — 200N000002 HC R&B ICU UMMC

## 2024-09-21 PROCEDURE — 97110 THERAPEUTIC EXERCISES: CPT | Mod: GP | Performed by: PHYSICAL THERAPIST

## 2024-09-21 PROCEDURE — 250N000013 HC RX MED GY IP 250 OP 250 PS 637: Performed by: INTERNAL MEDICINE

## 2024-09-21 PROCEDURE — 83735 ASSAY OF MAGNESIUM: CPT | Performed by: NURSE PRACTITIONER

## 2024-09-21 PROCEDURE — 250N000011 HC RX IP 250 OP 636: Performed by: STUDENT IN AN ORGANIZED HEALTH CARE EDUCATION/TRAINING PROGRAM

## 2024-09-21 PROCEDURE — 85520 HEPARIN ASSAY: CPT | Performed by: INTERNAL MEDICINE

## 2024-09-21 PROCEDURE — 97535 SELF CARE MNGMENT TRAINING: CPT | Mod: GO | Performed by: OCCUPATIONAL THERAPIST

## 2024-09-21 PROCEDURE — 85027 COMPLETE CBC AUTOMATED: CPT

## 2024-09-21 PROCEDURE — 250N000013 HC RX MED GY IP 250 OP 250 PS 637: Performed by: STUDENT IN AN ORGANIZED HEALTH CARE EDUCATION/TRAINING PROGRAM

## 2024-09-21 PROCEDURE — 80053 COMPREHEN METABOLIC PANEL: CPT

## 2024-09-21 PROCEDURE — 93005 ELECTROCARDIOGRAM TRACING: CPT

## 2024-09-21 PROCEDURE — 82330 ASSAY OF CALCIUM: CPT | Performed by: SURGERY

## 2024-09-21 PROCEDURE — 250N000011 HC RX IP 250 OP 636: Performed by: SURGERY

## 2024-09-21 PROCEDURE — 85520 HEPARIN ASSAY: CPT

## 2024-09-21 PROCEDURE — 250N000013 HC RX MED GY IP 250 OP 250 PS 637: Performed by: NURSE PRACTITIONER

## 2024-09-21 PROCEDURE — 85610 PROTHROMBIN TIME: CPT

## 2024-09-21 PROCEDURE — 99291 CRITICAL CARE FIRST HOUR: CPT | Mod: FS | Performed by: STUDENT IN AN ORGANIZED HEALTH CARE EDUCATION/TRAINING PROGRAM

## 2024-09-21 PROCEDURE — 71045 X-RAY EXAM CHEST 1 VIEW: CPT

## 2024-09-21 PROCEDURE — 84100 ASSAY OF PHOSPHORUS: CPT | Performed by: INTERNAL MEDICINE

## 2024-09-21 PROCEDURE — 85610 PROTHROMBIN TIME: CPT | Performed by: STUDENT IN AN ORGANIZED HEALTH CARE EDUCATION/TRAINING PROGRAM

## 2024-09-21 PROCEDURE — 85384 FIBRINOGEN ACTIVITY: CPT

## 2024-09-21 PROCEDURE — 99291 CRITICAL CARE FIRST HOUR: CPT | Mod: GC | Performed by: INTERNAL MEDICINE

## 2024-09-21 PROCEDURE — 250N000013 HC RX MED GY IP 250 OP 250 PS 637: Performed by: SURGERY

## 2024-09-21 PROCEDURE — 83605 ASSAY OF LACTIC ACID: CPT

## 2024-09-21 PROCEDURE — 97164 PT RE-EVAL EST PLAN CARE: CPT | Mod: GP | Performed by: PHYSICAL THERAPIST

## 2024-09-21 PROCEDURE — 97168 OT RE-EVAL EST PLAN CARE: CPT | Mod: GO | Performed by: OCCUPATIONAL THERAPIST

## 2024-09-21 PROCEDURE — 82805 BLOOD GASES W/O2 SATURATION: CPT | Performed by: STUDENT IN AN ORGANIZED HEALTH CARE EDUCATION/TRAINING PROGRAM

## 2024-09-21 PROCEDURE — 93010 ELECTROCARDIOGRAM REPORT: CPT | Performed by: INTERNAL MEDICINE

## 2024-09-21 PROCEDURE — 85027 COMPLETE CBC AUTOMATED: CPT | Performed by: STUDENT IN AN ORGANIZED HEALTH CARE EDUCATION/TRAINING PROGRAM

## 2024-09-21 PROCEDURE — 97129 THER IVNTJ 1ST 15 MIN: CPT | Mod: GO | Performed by: OCCUPATIONAL THERAPIST

## 2024-09-21 PROCEDURE — 71045 X-RAY EXAM CHEST 1 VIEW: CPT | Mod: 26 | Performed by: RADIOLOGY

## 2024-09-21 PROCEDURE — 97530 THERAPEUTIC ACTIVITIES: CPT | Mod: GP | Performed by: PHYSICAL THERAPIST

## 2024-09-21 RX ORDER — WARFARIN SODIUM 3 MG/1
3 TABLET ORAL
Status: COMPLETED | OUTPATIENT
Start: 2024-09-21 | End: 2024-09-21

## 2024-09-21 RX ORDER — GABAPENTIN 100 MG/1
100 CAPSULE ORAL AT BEDTIME
Status: DISCONTINUED | OUTPATIENT
Start: 2024-09-21 | End: 2024-10-05 | Stop reason: HOSPADM

## 2024-09-21 RX ORDER — DEXTROSE MONOHYDRATE 25 G/50ML
25-50 INJECTION, SOLUTION INTRAVENOUS
Status: DISCONTINUED | OUTPATIENT
Start: 2024-09-21 | End: 2024-10-05 | Stop reason: HOSPADM

## 2024-09-21 RX ORDER — HEPARIN SODIUM 10000 [USP'U]/100ML
0-5000 INJECTION, SOLUTION INTRAVENOUS CONTINUOUS
Status: DISCONTINUED | OUTPATIENT
Start: 2024-09-21 | End: 2024-09-25

## 2024-09-21 RX ORDER — NICOTINE POLACRILEX 4 MG
15-30 LOZENGE BUCCAL
Status: DISCONTINUED | OUTPATIENT
Start: 2024-09-21 | End: 2024-10-05 | Stop reason: HOSPADM

## 2024-09-21 RX ORDER — FUROSEMIDE 10 MG/ML
40 INJECTION INTRAMUSCULAR; INTRAVENOUS ONCE
Status: COMPLETED | OUTPATIENT
Start: 2024-09-21 | End: 2024-09-21

## 2024-09-21 RX ADMIN — ASPIRIN 81 MG CHEWABLE TABLET 81 MG: 81 TABLET CHEWABLE at 07:47

## 2024-09-21 RX ADMIN — METHOCARBAMOL TABLETS 750 MG: 750 TABLET, COATED ORAL at 22:25

## 2024-09-21 RX ADMIN — Medication 40 MG: at 07:46

## 2024-09-21 RX ADMIN — ACETAMINOPHEN 650 MG: 325 TABLET ORAL at 14:23

## 2024-09-21 RX ADMIN — Medication 60 ML: at 20:40

## 2024-09-21 RX ADMIN — POLYETHYLENE GLYCOL 3350 17 G: 17 POWDER, FOR SOLUTION ORAL at 07:46

## 2024-09-21 RX ADMIN — ACETAMINOPHEN 975 MG: 325 TABLET ORAL at 03:03

## 2024-09-21 RX ADMIN — METHOCARBAMOL TABLETS 750 MG: 750 TABLET, COATED ORAL at 11:42

## 2024-09-21 RX ADMIN — ESCITALOPRAM OXALATE 10 MG: 10 TABLET ORAL at 07:47

## 2024-09-21 RX ADMIN — THERA TABS 1 TABLET: TAB at 07:46

## 2024-09-21 RX ADMIN — WARFARIN SODIUM 3 MG: 3 TABLET ORAL at 18:04

## 2024-09-21 RX ADMIN — Medication 5 MG: at 20:23

## 2024-09-21 RX ADMIN — SENNOSIDES AND DOCUSATE SODIUM 2 TABLET: 8.6; 5 TABLET ORAL at 07:46

## 2024-09-21 RX ADMIN — FUROSEMIDE 40 MG: 10 INJECTION, SOLUTION INTRAVENOUS at 18:04

## 2024-09-21 RX ADMIN — Medication 60 ML: at 07:47

## 2024-09-21 RX ADMIN — ATORVASTATIN CALCIUM 80 MG: 80 TABLET, FILM COATED ORAL at 20:23

## 2024-09-21 RX ADMIN — GABAPENTIN 100 MG: 100 CAPSULE ORAL at 22:25

## 2024-09-21 RX ADMIN — AMIODARONE HYDROCHLORIDE 200 MG: 200 TABLET ORAL at 07:46

## 2024-09-21 RX ADMIN — HEPARIN SODIUM 950 UNITS/HR: 10000 INJECTION, SOLUTION INTRAVENOUS at 16:56

## 2024-09-21 RX ADMIN — METHOCARBAMOL TABLETS 750 MG: 750 TABLET, COATED ORAL at 00:21

## 2024-09-21 RX ADMIN — BISACODYL 10 MG: 10 SUPPOSITORY RECTAL at 15:32

## 2024-09-21 ASSESSMENT — ACTIVITIES OF DAILY LIVING (ADL)
ADLS_ACUITY_SCORE: 39
ADLS_ACUITY_SCORE: 35
ADLS_ACUITY_SCORE: 39
ADLS_ACUITY_SCORE: 35
ADLS_ACUITY_SCORE: 35
ADLS_ACUITY_SCORE: 33
ADLS_ACUITY_SCORE: 34
ADLS_ACUITY_SCORE: 35
ADLS_ACUITY_SCORE: 34
ADLS_ACUITY_SCORE: 35
ADLS_ACUITY_SCORE: 35
ADLS_ACUITY_SCORE: 33
ADLS_ACUITY_SCORE: 34
ADLS_ACUITY_SCORE: 35
ADLS_ACUITY_SCORE: 35

## 2024-09-21 NOTE — PROGRESS NOTES
Physical Therapy Re-evaluation   09/21/24 0830   Appointment Info   Signing Clinician's Name / Credentials (PT) Cesar Barboza PT   Rehab Comments (PT) HM 3, sternal   Living Environment   People in Home spouse   Current Living Arrangements house   Home Accessibility stairs within home;stairs to enter home   Number of Stairs, Main Entrance 3   Stair Railings, Main Entrance railings safe and in good condition   Number of Stairs, Within Home, Primary greater than 10 stairs   Stair Railings, Within Home, Primary railings safe and in good condition   Transportation Anticipated family or friend will provide   Living Environment Comments Ind PLOF, no AD, has a stair lift but does not need to use it, 3 GIAN   Self-Care   Usual Activity Tolerance moderate   Current Activity Tolerance fair   Equipment Currently Used at Home none   Fall history within last six months no   General Information   Onset of Illness/Injury or Date of Surgery 09/18/24   Referring Physician Julien Toribio MD   Patient/Family Therapy Goals Statement (PT) return home   Pertinent History of Current Problem (include personal factors and/or comorbidities that impact the POC) Duane C Johnson is a 74 year old male pmhx of anterior STEMI s/p PCI to the pLAD on 10/26/23 with a VT/VF arrest the next day (10/27) with patent stents and unchanged anatomy on repeat angiogram. Placed on amiodarone and discharged 11/03/23, ICD was not indicated as this was within 48h of his MI. His EF prior to discharge was 20-30% with a large area of akinesis in the LAD, placed on apixaban due to this (Apixaban dcd on 7/5/24). Has had multiple admissions for HF exacerbation last year.  Admitted on 8/30/24. He presents for 2 weeks of worsening fatigue; found to have BNP of 16k and L pleural effusion. Admitted for diuresis and RHC. CPX and RHC showed significant functional limitations due to heart failue. Plan for DT VAD while inpatient. Hallie placed on 9/16. S/p HM3 on 9/18.   Existing  Precautions/Restrictions sternal   General Observations 4L o2 via NC, VSS   Cognition   Affect/Mental Status (Cognition) WFL   Cognitive Status Comments a bit anxious and particular throughout session.   Strength (Manual Muscle Testing)   Strength Comments demos antigravity strength in bilateral LE's, generalized weakness as demonstrated by assist required for transfers and mobility.   Bed Mobility   Comment, (Bed Mobility) supine>sit modAx1   Transfers   Comment, (Transfers) Sit<>stand modA x1   Gait/Stairs (Locomotion)   Comment, (Gait/Stairs) unable to take any steps forward due to fatigue   Balance   Balance Comments requires UE support in standing.   Clinical Impression   Criteria for Skilled Therapeutic Intervention Yes, treatment indicated   PT Diagnosis (PT) impaired functional mobility   Influenced by the following impairments weakness, pain, deconditioning, impaired balance   Functional limitations due to impairments bed mobility, transfers, ambulation, stair negotiation   Clinical Presentation (PT Evaluation Complexity) stable   Clinical Presentation Rationale clinical judgment   Clinical Decision Making (Complexity) moderate complexity   Planned Therapy Interventions (PT) balance training;bed mobility training;gait training;home exercise program;neuromuscular re-education;stair training;strengthening;transfer training   Risk & Benefits of therapy have been explained evaluation/treatment results reviewed;care plan/treatment goals reviewed;risks/benefits reviewed;current/potential barriers reviewed;participants voiced agreement with care plan;participants included;patient   PT Total Evaluation Time   PT Eval, Re-eval Minutes (54396) 8   Physical Therapy Goals   PT Frequency 6x/week   PT Predicted Duration/Target Date for Goal Attainment 10/19/24   PT Goals Bed Mobility;Transfers   PT: Bed Mobility Independent;Supine to/from sit;Within precautions   PT: Transfers Independent;Bed to/from chair;Within  precautions   PT: Gait Independent;Within precautions;150 feet;Modified independent   PT: Stairs Independent;Greater than 10 stairs   PT Discharge Planning   PT Plan EOB, pivot transfers, sit<>Stands, pregait   PT Discharge Recommendation (DC Rec) [facility]-Motivated patient will benefit from intensive, interdisciplinary therapy.  Anticipate will be able to tolerate 3 hours of therapy per day   PT Rationale for DC Rec Patient below independent baseline mobility. Currently quite weak and deconditioned post LVAD surgery. Anticipate he may require some type of rehabilitation faciltity upon discharge for continued cares and therapy. Will update home safety as he progresses.   PT Brief overview of current status OH lift/ 2A pivot transfer

## 2024-09-21 NOTE — PROGRESS NOTES
[facility]     The SLUMS ([facility] Mental Status Exam) is a 30-point standardized cognitive screen used to identify the presence of cognitive deficits and/or to identify a change in cognition over time.  This screen assesses cognitive abilities in various domains.  [email or web address]     Patient's performance was as follows:     Total Score: 14     Scoring If High School Educated If Less than High School Educated   Normal 27-30 25-30   Mild Neurocognitive Disorder 21-26 20-24   Dementia 1-20 1-19      Score Interpretation: Score places patient in the dementia category.  Patient demonstrates deficits in the following areas:  (attention, immediate delayed recall, executive functions, clock drawing, and orientation).  Please note that this examination is used to screen individuals to look for the presence of cognitive deficits and to identify changes in cognition over time.  This is not a diagnosis.  This examination can be followed by further cognitive assessments if appropriate and deemed necessary.      Pt with poor insight to deficits and how they apply to his recovery and ability to care for his LVAD.  Reports he will build a tiny computer to help him manage when he is at home.

## 2024-09-21 NOTE — PLAN OF CARE
ICU End of Shift Summary. See flowsheets for vital signs and detailed assessment.    Changes this shift: Mentation fluctuating, particular about desires. Intermittent confusion, reports moderate pain & given PRNs. Heavy Ax2 w/ therapy to chair, returned to bed w/ ceiling lift. Afib/aflutter - confirmed w/ EKG, Epi weaned to 0.02. Hep gtt increased to 950. LVAD PI>2.0. NC in placed, weaned to 1L. Coughing up secretions independently. CT in place, intermittent air leak. Had large lunch & tolerating trickle TF of 15 mL/hr. Suppository given, pt having increased gas movement. Michael in place with minimal output.     Plan: Continue to encourage PO intake. Potential for delining tomorrow. Minimize overnight interruptions to decrease delirium.

## 2024-09-21 NOTE — PHARMACY-ANTICOAGULATION SERVICE
Clinical Pharmacy - Warfarin Dosing Consult     Pharmacy has been consulted to manage this patient s warfarin therapy.  Indication: LVAD/RVAD  Therapy Goal: INR 2-3  Provider/Team: [facility]ICU  Warfarin Prior to Admission: No  Significant drug interactions: aspirin, escitalopram, heparin (bridge therapy)  Recent documented change in oral intake/nutrition: Yes (on enteral feeds which may interfere with warfarin absorption)  Dose Comments: will dose conservatively today (2 chest tubes in)    INR   Date Value Ref Range Status   09/21/2024 1.25 (H) 0.85 - 1.15 Final   09/21/2024 1.28 (H) 0.85 - 1.15 Final       Recommend warfarin 3 mg today.  Pharmacy will monitor Duane C Johnson daily and order warfarin doses to achieve specified goal.      Please contact pharmacy as soon as possible if the warfarin needs to be held for a procedure or if the warfarin goals change.       Morenita Abebe, PharmD

## 2024-09-21 NOTE — PROGRESS NOTES
[facility]  Cardiology II Service / Advanced Heart Failure  Daily Progress Note    Patient: Duane C Johnson      : 1950      MRN: [id number]    Assessment/Plan:   Duane C Johnson is a 74 year old male pmhx of anterior STEMI s/p PCI to the pLAD on 10/26/23 with a VT/VF arrest the next day (10/27) with patent stents and unchanged anatomy on repeat angiogram. Placed on amiodarone and discharged 23, ICD was not indicated as this was within 48h of his MI. His EF prior to discharge was 20-30% with a large area of akinesis in the LAD, placed on apixaban due to this (Apixaban dcd on 24). Has had multiple admissions for HF exacerbation last year.  Admitted on 24. He presents for 2 weeks of worsening fatigue; found to have BNP of 16k and L pleural effusion. Admitted for diuresis and RHC. CPX and RHC showed significant functional limitations due to heart failue. Plan for DT VAD while inpatient. [place] placed on . S/p HM3 on .     # CAD s/p PCI to LAD  # h/o anterior STEMI on 10/26/23 with a VT/VF arrest the next day. Repeat angio showed patent stent on 10/27    # HFrEf 2/2 ICM (EF 15-20% by TTE 24) s/p HM3 on     # Afib DCCV on   # h/o VT/VF arrest in   # ICD placed on 24    [facility] [place] # : CVP 7, PA 21/10, PCWP 5, CO/CI 6.5/3/6             : CVP 9, PA 26/12, PCWP 7, CO/CI 6.6/3.6             : CVP 11, PA 28/15, PCWP 12, CO/CI 5/2.7    Fluid status: Euvolemic  RV afterload: off  Inotropes: epi 0.05>>0.03  Pressors: none  Anticoagulation: straight rate heparin, increasing to low intensity gtt later today, coumadin being started  LDH: Stable    #The patient's HeartMate 3 LVAD  was interrogated:  Heartmate 3 LEFT VS  Flow (Lpm): 3.5 Lpm  Pulse Index (PI): 4.8 PI  Speed (rpm): 5250 rpm  Power (jackson): 3.3 jackson  Current Hct settin   - The driveline exit site was inspected, c/d/i.   - All external components showed no evidence of  damage or malfunction, none replaced.  - Alarms were reviewed, and notable for: low PI    Recommendations:  -Plan for echo guided speed adjustment at bedside tomorrow  -Epi weaned today, park at 0.02 for overnight  -Primary team switching to low intensity heparin and starting coumadin  -Continue with amiodarone 200mg daily      Thank you for allowing me to care for this patient, please don't hesitate to contact me with any questions regarding this plan.     Pt was discussed and evaluated with Dr. Laurent, attending physician, who agrees with the assessment and plan above.    09/21/2024  ==================================    Subjective:     HM3 placement     Objective:     Vitals:    09/19/24 0600 09/20/24 0000 09/21/24 0400   Weight: 69.4 kg (153 lb) 71.1 kg (156 lb 12 oz) 73.1 kg (161 lb 2.5 oz)     Vital Signs with Ranges  Temp:  [98.4  F (36.9  C)-99.5  F (37.5  C)] 98.8  F (37.1  C)  Pulse:  [72-99] 92  Resp:  [12-27] 22  BP: ()/(49-80) 82/49  MAP:  [64 mmHg-104 mmHg] 81 mmHg  Arterial Line BP: ()/(56-96) 87/77  SpO2:  [97 %-100 %] 97 %  I/O last 3 completed shifts:  In: 2518.21 [P.O.:220; I.V.:1068.21; NG/GT:400; IV Piggyback:500]  Out: 1715 [Urine:1290; Chest Tube:425]    GENERAL: intubated sedated   HEENT: Atraumatic, moist mucus membranes, PERRL  NECK: R SGC  RESP: mechanical no wheezes/rhonchi/crackles.   CARDIO: Regular rate and rhythm. extremities warm and well perfused, no peripheral edema  ABD: Non-distended, non-tender, bowel sounds active  NEURO: sedated     Medications   Current Facility-Administered Medications   Medication Dose Route Frequency Provider Last Rate Last Admin    EPINEPHrine (ADRENALIN) 5 mg in  mL infusion  0.01-0.1 mcg/kg/min Intravenous Continuous Nolting, Bryon, PA-C 5.8 mL/hr at 09/21/24 1000 0.03 mcg/kg/min at 09/21/24 1000    heparin 25,000 units in 0.45% NaCl 250 mL ANTICOAGULANT infusion  0-5,000 Units/hr Intravenous Continuous Bryon Way PA-C 8 mL/hr at  09/21/24 1000 800 Units/hr at 09/21/24 1000    Med Instruction - Transition from IV Insulin Infusion to Sub-Q Insulin   Does not apply Continuous PRN Bryon Way PA-C        Reason beta blocker order not selected   Does not apply DOES NOT GO TO Julien Hill MD         Current Facility-Administered Medications   Medication Dose Route Frequency Provider Last Rate Last Admin    amiodarone (PACERONE) tablet 200 mg  200 mg Oral or Feeding Tube Daily Collette Virgen APRN CNP   200 mg at 09/21/24 0746    aspirin (ASA) chewable tablet 81 mg  81 mg Oral or NG Tube Daily Frederick Bledsoe APRN CNP   81 mg at 09/21/24 0747    atorvastatin (LIPITOR) tablet 80 mg  80 mg Oral or Feeding Tube QPM Collette Virgen APRN CNP   80 mg at 09/20/24 1952    chlorhexidine (PERIDEX) 0.12 % solution 15 mL  15 mL Swish & Spit BID Frederick Bledsoe APRN CNP   15 mL at 09/20/24 1952    escitalopram (LEXAPRO) tablet 10 mg  10 mg Oral or Feeding Tube Daily Collette Virgen APRN CNP   10 mg at 09/21/24 0747    gabapentin (NEURONTIN) capsule 100 mg  100 mg Oral or Feeding Tube At Bedtime Cristobal Laurnet MD        insulin aspart (NovoLOG) injection (RAPID ACTING)  1-7 Units Subcutaneous TID AC Bryon Way PA-C        insulin aspart (NovoLOG) injection (RAPID ACTING)  1-5 Units Subcutaneous At Bedtime Bryon Way PA-C        melatonin tablet 5 mg  5 mg Oral or Feeding Tube QPM Bryon Way PA-C        multivitamin, therapeutic (THERA-VIT) tablet 1 tablet  1 tablet Oral or Feeding Tube Daily Collette Virgen APRN CNP   1 tablet at 09/21/24 0746    [START ON 9/22/2024] pantoprazole (PROTONIX) 2 mg/mL suspension 40 mg  40 mg Oral or Feeding Tube Daily Cristobal Laurent MD        polyethylene glycol (MIRALAX) Packet 17 g  17 g Oral or Feeding Tube BID Frederick Bledsoe APRN CNP   17 g at 09/21/24 0746    Prosource TF20 ENfit Compatibl EN LIQD (PROSOURCE TF20) packet 60 mL  1 packet Per Feeding Tube BID Collette Virgen, APRN CNP    60 mL at 09/21/24 0747    senna-docusate (SENOKOT-S/PERICOLACE) 8.6-50 MG per tablet 2 tablet  2 tablet Oral or Feeding Tube BID Frederick Bledsoe APRN CNP   2 tablet at 09/21/24 0746    sodium chloride (PF) 0.9% PF flush 3 mL  3 mL Intracatheter Q8H Julien Toribio MD   3 mL at 09/20/24 0515    Warfarin Dose Required Daily - Pharmacist Managed  1 each Oral See Admin Instructions Bryon Way PA-C           Data   Recent Labs   Lab 09/21/24  1050 09/21/24  0932 09/21/24  0758 09/21/24  0324 09/20/24  1027 09/20/24  1015 09/20/24  0344 09/20/24  0335 09/19/24  1809 09/19/24  1534   WBC  --  10.3  --  9.8  --  9.4  --  10.2  --  8.3   HGB  --  8.2*  --  8.0*  --  8.2*  --  8.3*  --  8.9*   MCV  --  93  --  94  --  91  --  90  --  88   PLT  --  113*  --  110*  --  107*  --  113*  --  109*   INR  --  1.25*  --  1.28*  --   --   --  1.32*  --  1.32*   NA  --   --   --  134*  --   --   --  132*  132*  --  131*   POTASSIUM  --   --   --  4.1  --   --   --  4.5  4.5  --  4.5   CHLORIDE  --   --   --  101  --   --   --  99  99  --  99   CO2  --   --   --  24  --   --   --  26  26  --  26   BUN  --   --   --  21.3  --   --   --  18.6  18.6  --  20.7   CR  --   --   --  0.62*  --   --   --  0.67  0.67  --  0.65*   ANIONGAP  --   --   --  9  --   --   --  7  7  --  6*   PRANAV  --   --   --  7.6*  --   --   --  7.9*  7.9*  --  8.0*   *  --  132* 134*   < >  --    < > 139*  139*   < > 147*   ALBUMIN  --   --   --  2.3*  --   --   --  2.4*  --   --    PROTTOTAL  --   --   --  4.8*  --   --   --  4.7*  --   --    BILITOTAL  --   --   --  1.2  --   --   --  1.4*  --   --    ALKPHOS  --   --   --  76  --   --   --  62  --   --    ALT  --   --   --  21  --   --   --  24  --   --    AST  --   --   --  39  --   --   --  55*  --   --     < > = values in this interval not displayed.     [facility] 9/3/24    RA: 11/11/9  RV:39/9  PA:39/16/26  PCWP:16/22/15    Pa Sat:48.2%  Goldy; CO/CI: 2.91/1.71  Thermodilution; CO/CI:3.53/2.08    Right sided filling pressures are normal.   Left sided filling pressures are mildly elevated. Mild elevated pulmonary hypertension.   Reduced cardiac output level.         Echocardiogram Complete   Result Value    LVEF  15-20% (severely reduced)    [facility]    [id number]  [facility]  [id number]  [id number]^ROXANA^SONIA     [facility]  Echocardiography Laboratory  [address] [place] [address]     Name: JOHNSON, DUANE C  MRN: [id number]  : 1950  Study Date: 2024 08:20 AM  Age: 74 yrs  Gender: Male  Patient Location: [facility]  Reason For Study: CHF  Ordering Physician: SONIA SCHMIDT  Performed By: Yessenia Cano RDCS     BSA: 1.7 m2  Height: 66 in  Weight: 145 lb  HR: 71  BP: 94/73 mmHg  ______________________________________________________________________________  Procedure  Complete Portable Echo Adult. Contrast Optison. Optison (NDC #0694-0148-88)  given intravenously. Patient was given 6 ml mixture of 3 ml Optison and 6 ml  saline. 3 ml wasted.  ______________________________________________________________________________  Interpretation Summary  Left ventricular function is decreased. The ejection fraction is 15-20%  (severely reduced).  Moderate left ventricular dilation is present.  Apical wall akinesis is present.  Severe diffuse hypokinesis is present.  There is no thrombus seen in the left ventricle.  The right ventricle is normal size.  Global right ventricular function is normal.  Mild functional mitral insufficiency is present.  Mild to moderate tricuspid functional insufficiency is present.  Pulmonary hypertension is present.The right ventricular systolic pressure is  40mmHg above the right atrial pressure.  IVC diameter and respiratory changes fall into an intermediate range  suggesting an RA pressure of 8 mmHg.     This study was compared with the study from 2024 .MR, TR improved. LVEF  similar in both studies compared side to side. So  overall no change in LVEF  ______________________________________________________________________________  Left Ventricle  Left ventricular wall thickness is normal. Moderate left ventricular dilation  is present. Left ventricular diastolic function is not assessable. Left  ventricular function is decreased. The ejection fraction is 15-20% (severely  reduced). Apical wall akinesis is present. Severe diffuse hypokinesis is  present. There is no thrombus seen in the left ventricle.     Right Ventricle  The right ventricle is normal size. Global right ventricular function is  normal. A pacemaker lead is noted in the right ventricle.     Atria  Moderate left atrial enlargement is present. Moderate right atrial enlargement  is present.     Mitral Valve  Mild mitral insufficiency is present.     Aortic Valve  Trileaflet aortic sclerosis without stenosis. On Doppler interrogation, there  is no significant stenosis or regurgitation.     Tricuspid Valve  Mild to moderate tricuspid insufficiency is present. The right ventricular  systolic pressure is approximated at 39.7 mmHg plus the right atrial pressure.  Pulmonary hypertension is present. The right ventricular systolic pressure is  40mmHg above the right atrial pressure.     Pulmonic Valve  On Doppler interrogation, there is no significant stenosis or regurgitation.     Vessels  The aorta root is normal. IVC diameter and respiratory changes fall into an  intermediate range suggesting an RA pressure of 8 mmHg.     Pericardium  No pericardial effusion is present.     Compared to Previous Study  This study was compared with the study from 2/19/2024 . MR, TR improved. LVEF  similar in both studies compared side to side. So overall no change in LVEF.  ______________________________________________________________________________  MMode/2D Measurements & Calculations     IVSd: 0.89 cm  LVIDd: 6.0 cm  LVIDs: 5.5 cm  LVPWd: 0.79 cm  FS: 7.1 %  LV mass(C)d: 197.0 grams  LV  mass(C)dI: 112.9 grams/m2  LVOT diam: 2.0 cm  LVOT area: 3.2 cm2  EF Biplane: 16.8 %  LA Volume (BP): 71.3 ml  LA Volume Index (BP): 41.0 ml/m2  RV Base: 4.6 cm  RWT: 0.27     TAPSE: 1.0 cm     Doppler Measurements & Calculations  MV E max ivan: 82.7 cm/sec  LV V1 max P.9 mmHg  LV V1 max: 84.2 cm/sec  LV V1 VTI: 13.8 cm  MR PISA: 4.2 cm2  MR ERO: 0.33 cm2  MR volume: 35.7 ml  SV(LVOT): 43.8 ml  SI(LVOT): 25.1 ml/m2  PA acc time: 0.08 sec  TR max ivan: 315.1 cm/sec  TR max P.7 mmHg  RV S Ivan: 10.7 cm/sec     ______________________________________________________________________________  Report approved by: Jeanine MEJIA 2024 11:05 AM            Examination: XR CHEST 2 VIEWS 2024 3:06 PM     Indication: Shortness of breath     Comparison: Radiograph 2024. CT 2023.     Findings:  PA and lateral views of the chest. Left chest wall implantable cardiac  defibrillator with grossly intact leads/shock coil. Coronary stenting.  Trachea is midline. Stable mild cardiomegaly. Mild blunting of the  left costophrenic angle, likely representing small pleural effusion.  No pneumothorax. No focal consolidation or any definite abnormal  airspace opacities. No acute or suspicious osseous abnormality.  Unremarkable visualized abdomen.      Impression   Impression:   1. Stable mild cardiomegaly with implantable cardiac defibrillator and  coronary stenting.  2. Small left pleural effusion.     I have personally reviewed the examination and initial interpretation  and I agree with the findings.     PANCHITO EID MD         SYSTEM ID:  [id number]      2023 CMR  Clinical history: 73 year old with anterior STEMI, cardiac arrest in Oct 2023  Comparison CMR: none  1. The left ventricle is severely enlarged. There is wall thinning and akinesis of the mid-distal anterior,  mid anteroseptum, distal septum and the apex  The global systolic function is severely reduced. The LVEF is 28%.  2. The right ventricle  is normal in cavity size. The global systolic function is normal. The RVEF is 52%.   3. The right atrium is normal and the left atrium is severely enlarged.  4. There is mild mitral regurgitation.   5. There is transmural late gadolinium enhancement in mid-distal anterior, mid anteroseptum, distal  septum and the apex consistent with a large infarction in the LAD territory.   6. There is no pericardial effusion.  7. There is no intracardiac thrombus.  8. There are bilateral pleural effusions.  CONCLUSIONS:   Ischemic cardiomyopathy with a large anteroapical infarction.   Severely reduced left ventricular function and normal right ventricular function, LVEF 28% and RVEF 52%.     Cardiac Catheterization:  10/26/23    1st Mrg lesion is 20% stenosed.    Prox LAD to Mid LAD lesion is 100% stenosed.    1st Diag-1 lesion is 80% stenosed.    1st Diag-2 lesion is 50% stenosed.    Dist LAD lesion is 100% stenosed.    RPDA lesion is 70% stenosed.    Dist RCA lesion is 40% stenosed.     Anterior STEMI  Two vessel obstructive CAD (100% pLAD occlusion, 70% rPDA stenosis, 80% diagonal 1 stenosis)  PCI of pLAD to mLAD with BRENDA x 1 (Synergy 2.50x 28 mm) post dilated to 2.75 vessel  CVC placement in RIJ  LVEDP of 26 mmHg  Hemostasis of RRA with TR band      2/19/2024 Echo  Interpretation Summary     1. The left ventricle is moderately dilated. Left ventricular hypertrophy is  noted by two-dimensional echocardiography. Proximal septal thickening is  noted. Left ventricular systolic function is moderate to severely reduced. The  visual ejection fraction is 25-30%. Biplane LVEF is 25%. Diastolic Doppler  findings (E/E' ratio and/or other parameters) suggest left ventricular filling  pressures are increased. There is severe hypokinesis to akinesis of the mid  anterior/anterolateral/anteroseptal/inferoseptal walls and all apical segments  of the left ventricle. There is no thrombus seen in the left ventricle.  2. The right ventricle is  normal size. The right ventricular systolic function  is normal.  3. The left atrium is moderately dilated. Right atrial size is normal. There  is no color Doppler evidence of an atrial shunt.  4. There is moderate to mod-severe (2-3+) mitral regurgitation.  5. There is mild to moderate (1-2+) tricuspid regurgitation.Right ventricular  systolic pressure is elevated, consistent with moderate pulmonary  hypertension.  6. No pericardial effusion.  7. In direct comparison to the previous study dated 10/30/2023, there has been  an interval increase in the degree of mitral regurgitation. The other findings  are similar.     RHC 5/6/24  RA: 10/9/6 mmHg  RV: 61/11 mmHg  PA: 63/24/38 mmHg  PCWP: Unable to obtain accurate measurement  CO/CI (Goldy): 3.89/2.3 L/min/m2    PA sat: 64%  MAP: 85 mmHg       Right sided filling pressures are normal.    Mild elevated pulmonary hypertension.    Normal cardiac output level.        Device interrogated on 8/30/24  Device: [employer] [id number] RESONATE HF ICD  Normal device function.  Mode: DDDR  bpm  AP: 33%  : 3%  Intrinsic rhythm: Afib w/ VS  bpm  Thoracic Impedance: Below reference line, suggests possible intrathoracic fluid accumulation.  Lead Trends Appear Stable.  Estimated battery longevity to RRT = 12.5 years.    Atrial Arrhythmia: AF episode began on 8/28/24  AF [place]: 11%  Anticoagulant: Eliquis  Ventricular Arrhythmia: None  Setting Changes: Rate response turned ON in device, pacing mode changed to DDDR from DDD, fallback rate during AT/AF episodes 70 bpm.        I have reviewed today's vital signs, notes, medications, labs and imaging.  I have also seen and examined the patient and agree with the findings and plan as outlined above. Pt with HM3 on low intensity heparin and epi for RV support.  Plan to assess hemodynamics and if stable will remove SG catheter and place PICC line.  Advance diet and activity as tolerated.  Pt seen X2 for total critical care time  35 min.      Cristobal Laurent MD, PhD  Professor, Heart Failure and Cardiac Transplantation  [facility]

## 2024-09-21 NOTE — PROGRESS NOTES
CV ICU PROGRESS NOTE  September 21, 2024     Duane C Johnson  [id number]  Admitted: 8/30/2024  2:10 PM      ASSESSMENT: Duane C Johnson is a 74 year old male pmhx of CAD, STEMI s/p PCI to pLAD c/b VT/VF arrest in 2023, HFrEF 2/2 ICM (EF 10-15%), Afib s/p DDV, and s/p ICD placed 5/2024, PAC, HLD, prostate cancer, and anxiety who underwent LVAD placement (HeartMate 3) on 9/18/2024 by Dr. Mulvihill.    Today's Changes:  - Cards II to evaluate wedge/sweep speed  - Tentative plan to wean epi gtt  - Start Coumadin  - Start Low intensity heparin infusion  - FVG: Euvolemia    PLAN:  Neuro/ pain/ sedation:  #Acute Postoperative pain  #Anxiety  - Serial neurological examinations  - Delirium precautions  - PTA Lexapro 10 mg  - Gabapentin 100mg at bedtime  - PRN oxycodone 5-10 mg Q4H    Pulmonary care:   # No acute concerns  # Former tobacco abuse disorder  - Supplemental oxygen > 92% SpO2; currently on 3L NC  - Pulmonary hygiene: Incentive spirometer every 15- 30 minutes when awake, flutter valve,   - Earnestine weaned off 9/20/24  - Extubated 9/20/24    Cardiovascular:    # HFrEF (LVEF 10-15%) S/p LVAD placement (HeartMate 3) on 9/18/24 by Dr. Mulvihill  # Cardiogenic shock - improved  # STEMI s/p PCI to LAD in 2023 c/b VT/VF arrest   # S/p ICD 5/8/24  # A fib s/p cardioversion on 9/5/24  # HLD, HTN, & CAD  - CARDS II following  - Monitor hemodynamic status  - Goal MAP 65-85, CVP 8-10  - LVAD parameter changes: Speed decreased to 5200 9/19  - Epi gtt for inotropic support  - ICD device interrogation by EP nurse on 9/18  - Continue PTA amiodarone 200 mg daily atorvastatin 80 mg daily  - Continue ASA 81mg  - Start low intensity heparin infusion  - Start Coumadin    GI care:   # Concern for protein-calorie malnutrition  # Hypoalbuminemia  - Nutrition consulted, appreciated recommendations  - Diet: Mechanical soft  [facility]  - PPI  - Bowel regimen ordered  - Last BM 9/20/24    Renal/ Fluid Balance/  Electrolytes:   # Hyponatremia - improving  - Strict I/O, daily weights  - Avoid/limit nephrotoxins as able  - Replete lytes PRN per protocol  - FVG: euvolemic    # Hx prostate cancer sp Leuprolide and radiation therapy   - Completed Leuprolide on 6/11/24 and radiation therapy on 6/27/24. Last PSA was <0.01 on 7/29/24    Endocrine:    #Stress induced hyperglycemia  Preop A1c 4.8% on 3/13/23  - Sliding scale insulin ordered  - Goal BG <180 for optimal healing  - Hypoglycemia order set in place    ID/ Antibiotics:  # Stress induced leukocytosis  # Prophylactic perioperative antibiotics  - No indications of infections at this time  - Continue to monitor fever curve, WBC and inflammatory markers as appropriate    Heme:     #Acute blood loss anemia  #Acute blood loss thrombocytopenia  #Stress induced leukocytosis  - RTOR POD 0 for bleeding requiring chest washout and significant clot removal  - No s/sx active bleeding.  - Serial CBC ordered  - See anticoagulation plan above in CV    MSK/ Skin:  # Sternotomy  # Surgical Incision  - Sternal precautions  - Postoperative incision management per protocol  - PT/OT/CR    Prophylaxis:    - VTE: Mechanical. SQH.  - PPI     Lines/ tubes/ drains:  - RIJ CVC, PA catheter  - R Radial Arterial Line  - PIVx2 L and R forearm  - CTs x4 (anterior pericardial, anterior mediastinal, mediastinal, pleural)  - Michael  - NG right nare    Disposition:  - CVICU    Patient seen and findings/plan discussed with medical ICU staff, Dr. Clement.  Billing: This patient is critically ill: Yes. Total critical care time today 40 min. This does not include time spent on procedures or teaching.     NADEGE Vasquez CNP       ====================================    TODAY'S PROGRESS  SUBJECTIVE  NAEON    OBJECTIVE  1. VITAL SIGNS  Temp:  [98.4  F (36.9  C)-99.5  F (37.5  C)] 99.5  F (37.5  C)  Pulse:  [72-99] 77  Resp:  [14-32] 22  BP: ()/(49-80) 82/70  MAP:  [65 mmHg-104 mmHg] 78 mmHg  Arterial Line  BP: ()/(57-96) 84/71  SpO2:  [92 %-100 %] 92 %  FiO2 (%): 40 %, Resp: 22, Vent Mode: CMV/AC, Resp Rate (Set): 12 breaths/min, Tidal Volume (Set, mL): 450 mL, PEEP (cm H2O): 5 cmH2O, Resp Rate (Set): 12 breaths/min, Tidal Volume (Set, mL): 450 mL, PEEP (cm H2O): 5 cmH2O    2. INTAKE/ OUTPUT  I/O last 3 completed shifts:  In: 2518.21 [P.O.:220; I.V.:1068.21; NG/GT:400; IV Piggyback:500]  Out: 1715 [Urine:1290; Chest Tube:425]    3. PHYSICAL EXAMINATION  GEN: not in distress  EYES: PERRL, Anicteric sclera.   HEENT:  Normocephalic, atraumatic, trachea midline, Pupils PERRLA  CV: RRR, no gallops, rubs, or murmurs  PULM/CHEST: Clear breath sounds bilaterally upper and diminshed bilateral lower lobes without rhonchi, crackles or wheeze, symmetric chest rise  GI: normal bowel sounds, soft, non-tender, no rebound tenderness or guarding, no masses  : lyn catheter in place, urine yellow and clear  EXTREMITIES: No peripheral edema, moving all extremities, peripheral pulses intact  NEURO: AO x 3, confused to date, forgetful, appropriate to conversation, and following commands  SKIN: Surgical incision well approximated, per-incision natural skin color, and no edema  PSYCH:  Affect: appropriate       4. INVESTIGATIONS  Arterial Blood Gases   Recent Labs   Lab 09/20/24  1437 09/20/24  0335 09/19/24  2232 09/19/24  1534   PH 7.47* 7.48* 7.49* 7.47*   PCO2 39 38 37 38   PO2 124* 136* 173* 76*   HCO3 28 28 28 28     Complete Blood Count   Recent Labs   Lab 09/21/24  0932 09/21/24  0324 09/20/24  1015 09/20/24  0335   WBC 10.3 9.8 9.4 10.2   HGB 8.2* 8.0* 8.2* 8.3*   * 110* 107* 113*     Basic Metabolic Panel  Recent Labs   Lab 09/21/24  1050 09/21/24  0758 09/21/24  0324 09/21/24  0322 09/20/24  0344 09/20/24  0335 09/19/24  1809 09/19/24  1534 09/19/24  0301 09/19/24  0118   NA  --   --  134*  --   --  132*  132*  --  131*  --  133*  133*   POTASSIUM  --   --  4.1  --   --  4.5  4.5  --  4.5  --  4.6  4.6  "  CHLORIDE  --   --  101  --   --  99  99  --  99  --  98  98   CO2  --   --  24  --   --  26  26  --  26  --  25  25   BUN  --   --  21.3  --   --  18.6  18.6  --  20.7  --  26.6*  26.6*   CR  --   --  0.62*  --   --  0.67  0.67  --  0.65*  --  0.70  0.70   * 132* 134* 135*   < > 139*  139*   < > 147*   < > 131*  131*    < > = values in this interval not displayed.     Liver Function Tests  Recent Labs   Lab 09/21/24  0932 09/21/24 0324 09/20/24 0335 09/19/24  1534 09/19/24  0118 09/18/24  1451 09/18/24  1347   AST  --  39 55*  --  68*  --  71*   ALT  --  21 24  --  30  --  36   ALKPHOS  --  76 62  --  64  --  77   BILITOTAL  --  1.2 1.4*  --  3.8*  3.9*  --  2.2*   ALBUMIN  --  2.3* 2.4*  --  2.9*  --  2.7*   INR 1.25* 1.28* 1.32* 1.32* 1.24*   < > 1.44*    < > = values in this interval not displayed.     Pancreatic Enzymes  No lab results found in last 7 days.  Coagulation Profile  Recent Labs   Lab 09/21/24  0932 09/21/24 0324 09/20/24 0335 09/19/24  1534 09/19/24  0118   INR 1.25* 1.28* 1.32* 1.32* 1.24*   PTT  --  107* 37 37 36     Lactate  Invalid input(s): \"LACTATE\"    5. RADIOLOGY  09/20/2024  LVAD Echocardiogram   Interpretation Summary  HeartMate 3 LVAD at 5200 RPM.  Normal LV size with severely reduced global LV function, LVEF<20%. LVIDd=4.5  cm.  Moderately dilated RV with moderately reduced function.  The ventricular septum is midline.  The aortic valve remains closed. There is no AI.  LVAD inflow cannula is visualized in the LV apex. LVAD outflow graft is  visualized in the aorta. LVAD inflow cannula velocities were not assessed.  Normal Doppler interrogation of the LVAD outflow graft.  This study was compared with the study from 9/18/24: No significant changes  noted.    09/20/2024  CXR  IMPRESSION:  1.  Stable dense retrocardiac atelectasis/consolidation.  2.  Stable mild pulmonary edema, the left lung field obscured by  overlying devices.  3.  Probable contrast material over " the fundus following feeding tube  placement 9/19/2024.    09/19/2024 @ 0542  CXR  IMPRESSION:  Right IJ [place] catheter terminates 2 cm past its right hilar  intersection, likely terminating over the junction of the right main  pulmonary artery and interlobar artery. Consider slight further  retraction. Otherwise stable chest    09/19/2024 @ 0540  CXR  IMPRESSION:  Repositioned right IJ [place] catheter terminates over the right  main pulmonary artery. Otherwise, stable chest.    09/19/2024 @ 0117  IMPRESSION:  1.  Right IJ [place] catheter terminates over the right interlobar  artery. Consider partial retraction.  2.  Otherwise stable post surgical chest

## 2024-09-21 NOTE — PLAN OF CARE
ICU End of Shift Summary. See flowsheets for vital signs and detailed assessment.    Changes this shift:     Neuro: Intermittently confused. Follows commands. Disoriented to time. Delirium precautions in place.  CV/Hematology: Epi at 0.05 for inotropic support. MAP >65. LVAD: Flow  3.5 - 4.1, PI 2.6 - 4.7, Speed 5200 - 5250, Power 3.3 - 3.5 jackson. CVP 9. Cardiac index 2.8.  Resp: On 3 LPM via nasal cannula overnight  GI/: Marginal output via lyn. Needs encouragement to maintain good oral intake.  Skin: WDL ex incisions.  LDA: Right radial art line, Double lumen, central line via internal jugular MAC.    Drips  Heparin at 800  Epi at 0.05    Plan: Continue plan of care.    Goal Outcome Evaluation:    Plan of Care Reviewed With: patient    Overall Patient Progress: improving    Outcome Evaluation: No acute events. Delirium precaution in place.

## 2024-09-22 ENCOUNTER — APPOINTMENT (OUTPATIENT)
Dept: GENERAL RADIOLOGY | Facility: CLINIC | Age: 74
DRG: 001 | End: 2024-09-22
Attending: STUDENT IN AN ORGANIZED HEALTH CARE EDUCATION/TRAINING PROGRAM
Payer: MEDICARE

## 2024-09-22 ENCOUNTER — APPOINTMENT (OUTPATIENT)
Dept: GENERAL RADIOLOGY | Facility: CLINIC | Age: 74
DRG: 001 | End: 2024-09-22
Payer: MEDICARE

## 2024-09-22 ENCOUNTER — APPOINTMENT (OUTPATIENT)
Dept: CARDIOLOGY | Facility: CLINIC | Age: 74
DRG: 001 | End: 2024-09-22
Attending: STUDENT IN AN ORGANIZED HEALTH CARE EDUCATION/TRAINING PROGRAM
Payer: MEDICARE

## 2024-09-22 LAB
ABO/RH(D): NORMAL
ALBUMIN SERPL BCG-MCNC: 2.3 G/DL (ref 3.5–5.2)
ALP SERPL-CCNC: 79 U/L (ref 40–150)
ALT SERPL W P-5'-P-CCNC: 19 U/L (ref 0–70)
ANION GAP SERPL CALCULATED.3IONS-SCNC: 8 MMOL/L (ref 7–15)
ANTIBODY SCREEN: NEGATIVE
AST SERPL W P-5'-P-CCNC: 28 U/L (ref 0–45)
ATRIAL RATE - MUSE: NORMAL BPM
ATRIAL RATE - MUSE: NORMAL BPM
BASE EXCESS BLDV CALC-SCNC: 5.7 MMOL/L (ref -3–3)
BASE EXCESS BLDV CALC-SCNC: 5.9 MMOL/L (ref -3–3)
BASE EXCESS BLDV CALC-SCNC: 5.9 MMOL/L (ref -3–3)
BILIRUB SERPL-MCNC: 0.6 MG/DL
BUN SERPL-MCNC: 27.3 MG/DL (ref 8–23)
CA-I BLD-MCNC: 4.3 MG/DL (ref 4.4–5.2)
CALCIUM SERPL-MCNC: 7.7 MG/DL (ref 8.8–10.4)
CHLORIDE SERPL-SCNC: 99 MMOL/L (ref 98–107)
CREAT SERPL-MCNC: 0.61 MG/DL (ref 0.67–1.17)
DIASTOLIC BLOOD PRESSURE - MUSE: NORMAL MMHG
DIASTOLIC BLOOD PRESSURE - MUSE: NORMAL MMHG
EGFRCR SERPLBLD CKD-EPI 2021: >90 ML/MIN/1.73M2
ERYTHROCYTE [DISTWIDTH] IN BLOOD BY AUTOMATED COUNT: 17.5 % (ref 10–15)
GLUCOSE BLDC GLUCOMTR-MCNC: 110 MG/DL (ref 70–99)
GLUCOSE BLDC GLUCOMTR-MCNC: 116 MG/DL (ref 70–99)
GLUCOSE BLDC GLUCOMTR-MCNC: 123 MG/DL (ref 70–99)
GLUCOSE BLDC GLUCOMTR-MCNC: 124 MG/DL (ref 70–99)
GLUCOSE BLDC GLUCOMTR-MCNC: 133 MG/DL (ref 70–99)
GLUCOSE BLDC GLUCOMTR-MCNC: 140 MG/DL (ref 70–99)
GLUCOSE SERPL-MCNC: 127 MG/DL (ref 70–99)
HCO3 BLDV-SCNC: 30 MMOL/L (ref 21–28)
HCO3 BLDV-SCNC: 30 MMOL/L (ref 21–28)
HCO3 BLDV-SCNC: 31 MMOL/L (ref 21–28)
HCO3 SERPL-SCNC: 26 MMOL/L (ref 22–29)
HCT VFR BLD AUTO: 24.2 % (ref 40–53)
HGB BLD-MCNC: 7.9 G/DL (ref 13.3–17.7)
INR PPP: 1.19 (ref 0.85–1.15)
INTERPRETATION ECG - MUSE: NORMAL
INTERPRETATION ECG - MUSE: NORMAL
LACTATE SERPL-SCNC: 0.9 MMOL/L (ref 0.7–2)
LDH SERPL L TO P-CCNC: 303 U/L (ref 0–250)
LVEF ECHO: NORMAL
MAGNESIUM SERPL-MCNC: 2 MG/DL (ref 1.7–2.3)
MCH RBC QN AUTO: 30.2 PG (ref 26.5–33)
MCHC RBC AUTO-ENTMCNC: 32.6 G/DL (ref 31.5–36.5)
MCV RBC AUTO: 92 FL (ref 78–100)
O2/TOTAL GAS SETTING VFR VENT: 1 %
O2/TOTAL GAS SETTING VFR VENT: 24 %
O2/TOTAL GAS SETTING VFR VENT: 24 %
OXYHGB MFR BLDV: 54 % (ref 70–75)
OXYHGB MFR BLDV: 56 % (ref 70–75)
OXYHGB MFR BLDV: 56 % (ref 70–75)
P AXIS - MUSE: NORMAL DEGREES
P AXIS - MUSE: NORMAL DEGREES
PCO2 BLDV: 43 MM HG (ref 40–50)
PCO2 BLDV: 43 MM HG (ref 40–50)
PCO2 BLDV: 44 MM HG (ref 40–50)
PH BLDV: 7.45 [PH] (ref 7.32–7.43)
PH BLDV: 7.46 [PH] (ref 7.32–7.43)
PH BLDV: 7.46 [PH] (ref 7.32–7.43)
PHOSPHATE SERPL-MCNC: 2.5 MG/DL (ref 2.5–4.5)
PLATELET # BLD AUTO: 115 10E3/UL (ref 150–450)
PO2 BLDV: 29 MM HG (ref 25–47)
PO2 BLDV: 29 MM HG (ref 25–47)
PO2 BLDV: 31 MM HG (ref 25–47)
POTASSIUM SERPL-SCNC: 3.9 MMOL/L (ref 3.4–5.3)
PR INTERVAL - MUSE: NORMAL MS
PR INTERVAL - MUSE: NORMAL MS
PROT SERPL-MCNC: 5 G/DL (ref 6.4–8.3)
QRS DURATION - MUSE: 76 MS
QRS DURATION - MUSE: 80 MS
QT - MUSE: 372 MS
QT - MUSE: 388 MS
QTC - MUSE: 436 MS
QTC - MUSE: 457 MS
R AXIS - MUSE: 238 DEGREES
R AXIS - MUSE: 267 DEGREES
RBC # BLD AUTO: 2.62 10E6/UL (ref 4.4–5.9)
SAO2 % BLDV: 55.2 % (ref 70–75)
SAO2 % BLDV: 57.5 % (ref 70–75)
SAO2 % BLDV: 58 % (ref 70–75)
SODIUM SERPL-SCNC: 133 MMOL/L (ref 135–145)
SPECIMEN EXPIRATION DATE: NORMAL
SYSTOLIC BLOOD PRESSURE - MUSE: NORMAL MMHG
SYSTOLIC BLOOD PRESSURE - MUSE: NORMAL MMHG
T AXIS - MUSE: 10 DEGREES
T AXIS - MUSE: 30 DEGREES
UFH PPP CHRO-ACNC: 0.2 IU/ML
UFH PPP CHRO-ACNC: 0.26 IU/ML
UFH PPP CHRO-ACNC: 0.31 IU/ML
VENTRICULAR RATE- MUSE: 76 BPM
VENTRICULAR RATE- MUSE: 91 BPM
WBC # BLD AUTO: 9.5 10E3/UL (ref 4–11)

## 2024-09-22 PROCEDURE — 250N000011 HC RX IP 250 OP 636: Performed by: STUDENT IN AN ORGANIZED HEALTH CARE EDUCATION/TRAINING PROGRAM

## 2024-09-22 PROCEDURE — 82805 BLOOD GASES W/O2 SATURATION: CPT | Performed by: STUDENT IN AN ORGANIZED HEALTH CARE EDUCATION/TRAINING PROGRAM

## 2024-09-22 PROCEDURE — 85027 COMPLETE CBC AUTOMATED: CPT

## 2024-09-22 PROCEDURE — 250N000013 HC RX MED GY IP 250 OP 250 PS 637: Performed by: STUDENT IN AN ORGANIZED HEALTH CARE EDUCATION/TRAINING PROGRAM

## 2024-09-22 PROCEDURE — 86900 BLOOD TYPING SEROLOGIC ABO: CPT | Performed by: INTERNAL MEDICINE

## 2024-09-22 PROCEDURE — 86901 BLOOD TYPING SEROLOGIC RH(D): CPT | Performed by: INTERNAL MEDICINE

## 2024-09-22 PROCEDURE — 71045 X-RAY EXAM CHEST 1 VIEW: CPT | Mod: 26 | Performed by: RADIOLOGY

## 2024-09-22 PROCEDURE — 85520 HEPARIN ASSAY: CPT | Performed by: INTERNAL MEDICINE

## 2024-09-22 PROCEDURE — 200N000002 HC R&B ICU UMMC

## 2024-09-22 PROCEDURE — 82330 ASSAY OF CALCIUM: CPT | Performed by: SURGERY

## 2024-09-22 PROCEDURE — 93306 TTE W/DOPPLER COMPLETE: CPT | Mod: 26 | Performed by: INTERNAL MEDICINE

## 2024-09-22 PROCEDURE — 36569 INSJ PICC 5 YR+ W/O IMAGING: CPT

## 2024-09-22 PROCEDURE — 99291 CRITICAL CARE FIRST HOUR: CPT | Mod: 25 | Performed by: INTERNAL MEDICINE

## 2024-09-22 PROCEDURE — 999N000202 HC STATISTICAL VASC ACCESS NURSE TIME, 1-15 MINUTES

## 2024-09-22 PROCEDURE — 258N000003 HC RX IP 258 OP 636: Performed by: STUDENT IN AN ORGANIZED HEALTH CARE EDUCATION/TRAINING PROGRAM

## 2024-09-22 PROCEDURE — 250N000013 HC RX MED GY IP 250 OP 250 PS 637: Performed by: SURGERY

## 2024-09-22 PROCEDURE — 83615 LACTATE (LD) (LDH) ENZYME: CPT | Performed by: STUDENT IN AN ORGANIZED HEALTH CARE EDUCATION/TRAINING PROGRAM

## 2024-09-22 PROCEDURE — 71045 X-RAY EXAM CHEST 1 VIEW: CPT

## 2024-09-22 PROCEDURE — 85610 PROTHROMBIN TIME: CPT

## 2024-09-22 PROCEDURE — 272N000451 HC KIT SHRLOCK 5FR POWER PICC DOUBLE LUMEN

## 2024-09-22 PROCEDURE — 999N000065 XR CHEST PORT 1 VIEW

## 2024-09-22 PROCEDURE — 272N000473 HC KIT, VPS RHYTHM STYLET

## 2024-09-22 PROCEDURE — 250N000013 HC RX MED GY IP 250 OP 250 PS 637: Performed by: NURSE PRACTITIONER

## 2024-09-22 PROCEDURE — 250N000013 HC RX MED GY IP 250 OP 250 PS 637: Performed by: INTERNAL MEDICINE

## 2024-09-22 PROCEDURE — 83605 ASSAY OF LACTIC ACID: CPT

## 2024-09-22 PROCEDURE — 84100 ASSAY OF PHOSPHORUS: CPT

## 2024-09-22 PROCEDURE — 83735 ASSAY OF MAGNESIUM: CPT

## 2024-09-22 PROCEDURE — 93306 TTE W/DOPPLER COMPLETE: CPT

## 2024-09-22 PROCEDURE — 82040 ASSAY OF SERUM ALBUMIN: CPT

## 2024-09-22 PROCEDURE — 250N000013 HC RX MED GY IP 250 OP 250 PS 637

## 2024-09-22 PROCEDURE — 250N000011 HC RX IP 250 OP 636: Performed by: INTERNAL MEDICINE

## 2024-09-22 RX ORDER — WARFARIN SODIUM 3 MG/1
3 TABLET ORAL
Status: COMPLETED | OUTPATIENT
Start: 2024-09-22 | End: 2024-09-22

## 2024-09-22 RX ORDER — PANTOPRAZOLE SODIUM 40 MG/1
40 TABLET, DELAYED RELEASE ORAL
Status: DISCONTINUED | OUTPATIENT
Start: 2024-09-23 | End: 2024-10-05 | Stop reason: HOSPADM

## 2024-09-22 RX ORDER — LIDOCAINE 40 MG/G
CREAM TOPICAL
Status: ACTIVE | OUTPATIENT
Start: 2024-09-22 | End: 2024-09-25

## 2024-09-22 RX ORDER — POLYETHYLENE GLYCOL 3350 17 G/17G
17 POWDER, FOR SOLUTION ORAL DAILY
Status: DISCONTINUED | OUTPATIENT
Start: 2024-09-22 | End: 2024-09-24

## 2024-09-22 RX ORDER — AMOXICILLIN 250 MG
2 CAPSULE ORAL 2 TIMES DAILY PRN
Status: DISCONTINUED | OUTPATIENT
Start: 2024-09-22 | End: 2024-10-05 | Stop reason: HOSPADM

## 2024-09-22 RX ORDER — MAGNESIUM SULFATE HEPTAHYDRATE 40 MG/ML
2 INJECTION, SOLUTION INTRAVENOUS ONCE
Status: COMPLETED | OUTPATIENT
Start: 2024-09-22 | End: 2024-09-22

## 2024-09-22 RX ORDER — HEPARIN SODIUM,PORCINE 10 UNIT/ML
5-20 VIAL (ML) INTRAVENOUS EVERY 24 HOURS
Status: DISCONTINUED | OUTPATIENT
Start: 2024-09-22 | End: 2024-10-05 | Stop reason: HOSPADM

## 2024-09-22 RX ORDER — HEPARIN SODIUM,PORCINE 10 UNIT/ML
5-20 VIAL (ML) INTRAVENOUS
Status: DISCONTINUED | OUTPATIENT
Start: 2024-09-22 | End: 2024-10-05 | Stop reason: HOSPADM

## 2024-09-22 RX ADMIN — ATORVASTATIN CALCIUM 80 MG: 80 TABLET, FILM COATED ORAL at 20:08

## 2024-09-22 RX ADMIN — Medication 5 MG: at 20:08

## 2024-09-22 RX ADMIN — ACETAMINOPHEN 650 MG: 325 TABLET ORAL at 20:08

## 2024-09-22 RX ADMIN — HEPARIN SODIUM 1100 UNITS/HR: 10000 INJECTION, SOLUTION INTRAVENOUS at 15:43

## 2024-09-22 RX ADMIN — ESCITALOPRAM OXALATE 10 MG: 10 TABLET ORAL at 07:52

## 2024-09-22 RX ADMIN — GABAPENTIN 100 MG: 100 CAPSULE ORAL at 22:47

## 2024-09-22 RX ADMIN — AMIODARONE HYDROCHLORIDE 200 MG: 200 TABLET ORAL at 07:52

## 2024-09-22 RX ADMIN — THERA TABS 1 TABLET: TAB at 07:52

## 2024-09-22 RX ADMIN — ASPIRIN 81 MG CHEWABLE TABLET 81 MG: 81 TABLET CHEWABLE at 07:52

## 2024-09-22 RX ADMIN — POTASSIUM & SODIUM PHOSPHATES POWDER PACK 280-160-250 MG 1 PACKET: 280-160-250 PACK at 05:59

## 2024-09-22 RX ADMIN — Medication 40 MG: at 07:53

## 2024-09-22 RX ADMIN — CHLORHEXIDINE GLUCONATE 0.12% ORAL RINSE 15 ML: 1.2 LIQUID ORAL at 07:52

## 2024-09-22 RX ADMIN — MAGNESIUM SULFATE HEPTAHYDRATE 2 G: 2 INJECTION, SOLUTION INTRAVENOUS at 05:59

## 2024-09-22 RX ADMIN — EPINEPHRINE 0.02 MCG/KG/MIN: 1 INJECTION INTRAMUSCULAR; INTRAVENOUS; SUBCUTANEOUS at 00:26

## 2024-09-22 RX ADMIN — WARFARIN SODIUM 3 MG: 3 TABLET ORAL at 17:37

## 2024-09-22 RX ADMIN — POTASSIUM & SODIUM PHOSPHATES POWDER PACK 280-160-250 MG 1 PACKET: 280-160-250 PACK at 10:37

## 2024-09-22 RX ADMIN — Medication 60 ML: at 07:54

## 2024-09-22 ASSESSMENT — ACTIVITIES OF DAILY LIVING (ADL)
ADLS_ACUITY_SCORE: 39

## 2024-09-22 NOTE — PROGRESS NOTES
[facility]  Cardiology II Service / Advanced Heart Failure  Daily Progress Note    Patient: Duane C Johnson      : 1950      MRN: [id number]    Assessment/Plan:   Duane C Johnson is a 74 year old male pmhx of anterior STEMI s/p PCI to the pLAD on 10/26/23 with a VT/VF arrest the next day (10/27) with patent stents and unchanged anatomy on repeat angiogram. Placed on amiodarone and discharged 23, ICD was not indicated as this was within 48h of his MI. His EF prior to discharge was 20-30% with a large area of akinesis in the LAD, placed on apixaban due to this (Apixaban dcd on 24). Has had multiple admissions for HF exacerbation last year.  Admitted on 24. He presents for 2 weeks of worsening fatigue; found to have BNP of 16k and L pleural effusion. Admitted for diuresis and RHC. CPX and RHC showed significant functional limitations due to heart failue. Plan for DT VAD while inpatient. [place] placed on . S/p HM3 on .       Recommendations:  - echo to see if we can increase the speed   - wean off Epi today  - keep net even to negative 500cc  -Continue with amiodarone 200mg daily        # CAD s/p PCI to LAD  # h/o anterior STEMI on 10/26/23 with a VT/VF arrest the next day. Repeat angio showed patent stent on 10/27    # HFrEf 2/2 ICM (EF 15-20% by TTE 24) s/p HM3 on     # Afib DCCV on   # h/o VT/VF arrest in   # ICD placed on 24    SGC # : CVP 7, PA 21/10, PCWP 5, CO/CI 6.5/3/6             : CVP 9, PA 26/12, PCWP 7, CO/CI 6.6/3.6             : CVP 11, PA 28/15, PCWP 12, CO/CI 5/2.7    Fluid status: Euvolemic  RV afterload: off  Inotropes: epi 0.02  Pressors: none  Anticoagulation: low intensity gtt later today, coumadin being started  LDH: Stable (ordered today daily)    #The patient's HeartMate 3 LVAD  was interrogated:  Heartmate 3 LEFT VS  Flow (Lpm): 3.3 Lpm  Pulse Index (PI): 5 PI  Speed (rpm): 5250 rpm  Power  (jackson): 3.3 jackson  Current Hct settin   - The driveline exit site was inspected, c/d/i.   - All external components showed no evidence of damage or malfunction, none replaced.  - Alarms were reviewed, and notable for: NA    Thank you for allowing me to care for this patient, please don't hesitate to contact me with any questions regarding this plan.     Pt was discussed and evaluated with Dr. Laurent, attending physician, who agrees with the assessment and plan above.    Vladimir Johnson MD, PhD, Msc   Cardiology Fellow      2024  ==================================    Subjective:     No new issues. VSS. Sititng in chair on RA. Progressing well.     Objective:     Vitals:    24 0000 24 0400 24 0400   Weight: 71.1 kg (156 lb 12 oz) 73.1 kg (161 lb 2.5 oz) 72.6 kg (160 lb 0.9 oz)     Vital Signs with Ranges  Temp:  [97.2  F (36.2  C)-99.5  F (37.5  C)] 97.9  F (36.6  C)  Pulse:  [72-94] 81  Resp:  [15-32] 20  BP: (82-89)/(70-75) 89/75  MAP:  [57 mmHg-87 mmHg] 67 mmHg  Arterial Line BP: ()/(13-81) 71/55  SpO2:  [90 %-100 %] 97 %  I/O last 3 completed shifts:  In: 1887.3 [P.O.:300; I.V.:719.3; NG/GT:508]  Out: 2795 [Urine:2355; Chest Tube:440]    GENERAL:  sitting in chair on RA  HEENT: Atraumatic, moist mucus membranes  NECK: no JVD  RESP: Clear bilateral occasional cracles at bases b/l  CARDIO: Regular rate and rhythm. extremities warm and well perfused, no peripheral edema  ABD: Non-distended, non-tender, bowel sounds active  NEURO: sedated     Medications   Current Facility-Administered Medications   Medication Dose Route Frequency Provider Last Rate Last Admin    heparin 25,000 units in 0.45% NaCl 250 mL ANTICOAGULANT infusion  0-5,000 Units/hr Intravenous Continuous Nolting, NAZIA Slater 11 mL/hr at 24 1000 1,100 Units/hr at 24 1000    Reason beta blocker order not selected   Does not apply DOES NOT GO TO Julien Hill MD         Current Facility-Administered  Medications   Medication Dose Route Frequency Provider Last Rate Last Admin    amiodarone (PACERONE) tablet 200 mg  200 mg Oral or Feeding Tube Daily Collette Virgen APRN CNP   200 mg at 09/22/24 0752    aspirin (ASA) chewable tablet 81 mg  81 mg Oral or NG Tube Daily Frederick Bledsoe APRN CNP   81 mg at 09/22/24 0752    atorvastatin (LIPITOR) tablet 80 mg  80 mg Oral or Feeding Tube QPM Collette Virgen APRN CNP   80 mg at 09/21/24 2023    chlorhexidine (PERIDEX) 0.12 % solution 15 mL  15 mL Swish & Spit BID Frederick Bledsoe APRN CNP   15 mL at 09/22/24 0752    escitalopram (LEXAPRO) tablet 10 mg  10 mg Oral or Feeding Tube Daily Collette Virgen APRN CNP   10 mg at 09/22/24 0752    gabapentin (NEURONTIN) capsule 100 mg  100 mg Oral or Feeding Tube At Bedtime Cristobal Laurent MD   100 mg at 09/21/24 2225    insulin aspart (NovoLOG) injection (RAPID ACTING)  1-7 Units Subcutaneous TID AC Bryon Way PA-C        insulin aspart (NovoLOG) injection (RAPID ACTING)  1-5 Units Subcutaneous At Bedtime Bryon Way PA-C        melatonin tablet 5 mg  5 mg Oral or Feeding Tube QPM Bryon Way PA-C   5 mg at 09/21/24 2023    multivitamin, therapeutic (THERA-VIT) tablet 1 tablet  1 tablet Oral or Feeding Tube Daily Collette Virgen APRN CNP   1 tablet at 09/22/24 0752    [START ON 9/23/2024] pantoprazole (PROTONIX) EC tablet 40 mg  40 mg Oral QAM AC Cristobal Laurent MD        polyethylene glycol (MIRALAX) Packet 17 g  17 g Oral or Feeding Tube Daily Yessenia Peralta APRN CNP        sodium chloride (PF) 0.9% PF flush 3 mL  3 mL Intracatheter Q8H Julien Toribio MD   3 mL at 09/22/24 0559    warfarin ANTICOAGULANT (COUMADIN) tablet 3 mg  3 mg Oral ONCE at 18:00 Cristobal Laurent MD        Warfarin Dose Required Daily - Pharmacist Managed  1 each Oral See Admin Instructions Bryon Way PA-C           Data   Recent Labs   Lab 09/22/24  0813 09/22/24  0344 09/22/24  0342 09/21/24  1050 09/21/24  0932  24  0758 24  0324 24  0344 24  0335   WBC  --  9.5  --   --  10.3  --  9.8   < > 10.2   HGB  --  7.9*  --   --  8.2*  --  8.0*   < > 8.3*   MCV  --  92  --   --  93  --  94   < > 90   PLT  --  115*  --   --  113*  --  110*   < > 113*   INR  --  1.19*  --   --  1.25*  --  1.28*  --  1.32*   NA  --  133*  --   --   --   --  134*  --  132*  132*   POTASSIUM  --  3.9  --   --   --   --  4.1  --  4.5  4.5   CHLORIDE  --  99  --   --   --   --  101  --  99  99   CO2  --  26  --   --   --   --  -    26   BUN  --  27.3*  --   --   --   --  21.3  --  18.6  18.6   CR  --  0.61*  --   --   --   --  0.62*  --  0.67  0.67   ANIONGAP  --  8  --   --   --   --  9  --  7  7   PRANAV  --  7.7*  --   --   --   --  7.6*  --  7.9*  7.9*   * 127* 124*   < >  --    < > 134*   < > 139*  139*   ALBUMIN  --  2.3*  --   --   --   --  2.3*  --  2.4*   PROTTOTAL  --  5.0*  --   --   --   --  4.8*  --  4.7*   BILITOTAL  --  0.6  --   --   --   --  1.2  --  1.4*   ALKPHOS  --  79  --   --   --   --  76  --  62   ALT  --  19  --   --   --   --    --  24   AST  --  28  --   --   --   --  39  --  55*    < > = values in this interval not displayed.     RHC 9/3/24    RA:   RV:  PA:  PCWP:16/22/15    Pa Sat:48.2%  Goldy; CO/CI: 2.91/1.71  Thermodilution; CO/CI:3.53/2.08   Right sided filling pressures are normal.   Left sided filling pressures are mildly elevated. Mild elevated pulmonary hypertension.   Reduced cardiac output level.         Echocardiogram Complete   Result Value    LVEF  15-20% (severely reduced)    [facility]    [id number]  [facility]  [id number]  [id number]^ROXANA^SONIA     [facility]  Echocardiography Laboratory  [address] [place] [id number]     Name: JOHNSON, DUANE C  MRN: [id number]  : 1950  Study Date: 2024 08:20 AM  Age: 74 yrs  Gender: Male  Patient Location: [facility]  Reason For Study: CHF  Ordering Physician:  SONIA SCHMIDT  Performed By: Yessenia Cano RDCS     BSA: 1.7 m2  Height: 66 in  Weight: 145 lb  HR: 71  BP: 94/73 mmHg  ______________________________________________________________________________  Procedure  Complete Portable Echo Adult. Contrast Optison. Optison (NDC #0085-5445-37)  given intravenously. Patient was given 6 ml mixture of 3 ml Optison and 6 ml  saline. 3 ml wasted.  ______________________________________________________________________________  Interpretation Summary  Left ventricular function is decreased. The ejection fraction is 15-20%  (severely reduced).  Moderate left ventricular dilation is present.  Apical wall akinesis is present.  Severe diffuse hypokinesis is present.  There is no thrombus seen in the left ventricle.  The right ventricle is normal size.  Global right ventricular function is normal.  Mild functional mitral insufficiency is present.  Mild to moderate tricuspid functional insufficiency is present.  Pulmonary hypertension is present.The right ventricular systolic pressure is  40mmHg above the right atrial pressure.  IVC diameter and respiratory changes fall into an intermediate range  suggesting an RA pressure of 8 mmHg.     This study was compared with the study from 2/19/2024 .MR, TR improved. LVEF  similar in both studies compared side to side. So overall no change in LVEF  ______________________________________________________________________________  Left Ventricle  Left ventricular wall thickness is normal. Moderate left ventricular dilation  is present. Left ventricular diastolic function is not assessable. Left  ventricular function is decreased. The ejection fraction is 15-20% (severely  reduced). Apical wall akinesis is present. Severe diffuse hypokinesis is  present. There is no thrombus seen in the left ventricle.     Right Ventricle  The right ventricle is normal size. Global right ventricular function is  normal. A pacemaker lead is noted in the right  ventricle.     Atria  Moderate left atrial enlargement is present. Moderate right atrial enlargement  is present.     Mitral Valve  Mild mitral insufficiency is present.     Aortic Valve  Trileaflet aortic sclerosis without stenosis. On Doppler interrogation, there  is no significant stenosis or regurgitation.     Tricuspid Valve  Mild to moderate tricuspid insufficiency is present. The right ventricular  systolic pressure is approximated at 39.7 mmHg plus the right atrial pressure.  Pulmonary hypertension is present. The right ventricular systolic pressure is  40mmHg above the right atrial pressure.     Pulmonic Valve  On Doppler interrogation, there is no significant stenosis or regurgitation.     Vessels  The aorta root is normal. IVC diameter and respiratory changes fall into an  intermediate range suggesting an RA pressure of 8 mmHg.     Pericardium  No pericardial effusion is present.     Compared to Previous Study  This study was compared with the study from 2024 . MR, TR improved. LVEF  similar in both studies compared side to side. So overall no change in LVEF.  ______________________________________________________________________________  MMode/2D Measurements & Calculations     IVSd: 0.89 cm  LVIDd: 6.0 cm  LVIDs: 5.5 cm  LVPWd: 0.79 cm  FS: 7.1 %  LV mass(C)d: 197.0 grams  LV mass(C)dI: 112.9 grams/m2  LVOT diam: 2.0 cm  LVOT area: 3.2 cm2  EF Biplane: 16.8 %  LA Volume (BP): 71.3 ml  LA Volume Index (BP): 41.0 ml/m2  RV Base: 4.6 cm  RWT: 0.27     TAPSE: 1.0 cm     Doppler Measurements & Calculations  MV E max ivan: 82.7 cm/sec  LV V1 max P.9 mmHg  LV V1 max: 84.2 cm/sec  LV V1 VTI: 13.8 cm  MR PISA: 4.2 cm2  MR ERO: 0.33 cm2  MR volume: 35.7 ml  SV(LVOT): 43.8 ml  SI(LVOT): 25.1 ml/m2  PA acc time: 0.08 sec  TR max ivan: 315.1 cm/sec  TR max P.7 mmHg  RV S Ivan: 10.7 cm/sec     ______________________________________________________________________________  Report approved by: JAMES NEVAREZ  Jeanine 08/31/2024 11:05 AM            Examination: XR CHEST 2 VIEWS 8/30/2024 3:06 PM     Indication: Shortness of breath     Comparison: Radiograph 8/24/2024. CT 12/25/2023.     Findings:  PA and lateral views of the chest. Left chest wall implantable cardiac  defibrillator with grossly intact leads/shock coil. Coronary stenting.  Trachea is midline. Stable mild cardiomegaly. Mild blunting of the  left costophrenic angle, likely representing small pleural effusion.  No pneumothorax. No focal consolidation or any definite abnormal  airspace opacities. No acute or suspicious osseous abnormality.  Unremarkable visualized abdomen.      Impression   Impression:   1. Stable mild cardiomegaly with implantable cardiac defibrillator and  coronary stenting.  2. Small left pleural effusion.     I have personally reviewed the examination and initial interpretation  and I agree with the findings.     PANCHITO EID MD         SYSTEM ID:  [id number]      12/6/2023 CMR  Clinical history: 73 year old with anterior STEMI, cardiac arrest in Oct 2023  Comparison CMR: none  1. The left ventricle is severely enlarged. There is wall thinning and akinesis of the mid-distal anterior,  mid anteroseptum, distal septum and the apex  The global systolic function is severely reduced. The LVEF is 28%.  2. The right ventricle is normal in cavity size. The global systolic function is normal. The RVEF is 52%.   3. The right atrium is normal and the left atrium is severely enlarged.  4. There is mild mitral regurgitation.   5. There is transmural late gadolinium enhancement in mid-distal anterior, mid anteroseptum, distal  septum and the apex consistent with a large infarction in the LAD territory.   6. There is no pericardial effusion.  7. There is no intracardiac thrombus.  8. There are bilateral pleural effusions.  CONCLUSIONS:   Ischemic cardiomyopathy with a large anteroapical infarction.   Severely reduced left ventricular function and normal  right ventricular function, LVEF 28% and RVEF 52%.     Cardiac Catheterization:  10/26/23    1st Mrg lesion is 20% stenosed.    Prox LAD to Mid LAD lesion is 100% stenosed.    1st Diag-1 lesion is 80% stenosed.    1st Diag-2 lesion is 50% stenosed.    Dist LAD lesion is 100% stenosed.    RPDA lesion is 70% stenosed.    Dist RCA lesion is 40% stenosed.     Anterior STEMI  Two vessel obstructive CAD (100% pLAD occlusion, 70% rPDA stenosis, 80% diagonal 1 stenosis)  PCI of pLAD to mLAD with BRENDA x 1 (Synergy 2.50x 28 mm) post dilated to 2.75 vessel  CVC placement in RIJ  LVEDP of 26 mmHg  Hemostasis of RRA with TR band      2/19/2024 Echo  Interpretation Summary     1. The left ventricle is moderately dilated. Left ventricular hypertrophy is  noted by two-dimensional echocardiography. Proximal septal thickening is  noted. Left ventricular systolic function is moderate to severely reduced. The  visual ejection fraction is 25-30%. Biplane LVEF is 25%. Diastolic Doppler  findings (E/E' ratio and/or other parameters) suggest left ventricular filling  pressures are increased. There is severe hypokinesis to akinesis of the mid  anterior/anterolateral/anteroseptal/inferoseptal walls and all apical segments  of the left ventricle. There is no thrombus seen in the left ventricle.  2. The right ventricle is normal size. The right ventricular systolic function  is normal.  3. The left atrium is moderately dilated. Right atrial size is normal. There  is no color Doppler evidence of an atrial shunt.  4. There is moderate to mod-severe (2-3+) mitral regurgitation.  5. There is mild to moderate (1-2+) tricuspid regurgitation.Right ventricular  systolic pressure is elevated, consistent with moderate pulmonary  hypertension.  6. No pericardial effusion.  7. In direct comparison to the previous study dated 10/30/2023, there has been  an interval increase in the degree of mitral regurgitation. The other findings  are similar.     RHC  5/6/24  RA: 10/9/6 mmHg  RV: 61/11 mmHg  PA: 63/24/38 mmHg  PCWP: Unable to obtain accurate measurement  CO/CI (Goldy): 3.89/2.3 L/min/m2    PA sat: 64%  MAP: 85 mmHg       Right sided filling pressures are normal.    Mild elevated pulmonary hypertension.    Normal cardiac output level.      ICD 9/9/24:    Full ICD interrogation performed after MRI complete.  Normal Device Function.   Intrinsic Rhythm: AF w/ VS  bpm  MRI Protection Mode Programmed OFF.  Pacing Programmed From OVO to DDDR  bpm.  ICD Tachy Therapies Programmed ON  Estimated Battery Longevity to RRT= 11 years.  Permanent Programming Changes: None      I have reviewed today's vital signs, notes, medications, labs and imaging.  I have also seen and examined the patient and agree with the findings and plan as outlined above. Pt with HM3 on low intensity heparin and epi for RV support.  Plan to assess hemodynamics and if stable will remove SG catheter and place PICC line.  Advance diet and activity as tolerated.  Pt seen X2 for total critical care time 35 min.      Cristobal Laurent MD, PhD  Professor, Heart Failure and Cardiac Transplantation  [facility]

## 2024-09-22 NOTE — PROGRESS NOTES
CV ICU PROGRESS NOTE  September 22, 2024     Duane C Johnson  [id number]  Admitted: 8/30/2024  2:10 PM      ASSESSMENT: Duane C Johnson is a 74 year old male pmhx of CAD, STEMI s/p PCI to pLAD c/b VT/VF arrest in 2023, HFrEF 2/2 ICM (EF 10-15%), Afib s/p DDV, and s/p ICD placed 5/2024, PAC, HLD, prostate cancer, and anxiety who underwent LVAD placement (HeartMate 3) on 9/18/2024 by Dr. Mulvihill.    Today's Changes:  - wean Epi as tolerated  - Goal net even  - Place PICC, if CVP correlates remove central line  - Pull NG  - Start Coumadin    PLAN:  Neuro/ pain/ sedation:  #Acute Postoperative pain  #Anxiety  - Serial neurological examinations  - Delirium precautions  - PTA Lexapro 10 mg  - Gabapentin 100mg at bedtime  - PRN oxycodone 5-10 mg Q4H    Pulmonary care:   # No acute concerns  # Former tobacco abuse disorder  - Supplemental oxygen > 92% SpO2; currently on 3L NC  - Pulmonary hygiene: Incentive spirometer every 15- 30 minutes when awake, flutter valve,   - Earnestine weaned off 9/20/24  - Extubated 9/20/24    Cardiovascular:    # HFrEF (LVEF 10-15%) S/p LVAD placement (HeartMate 3) on 9/18/24 by Dr. Mulvihill  # Cardiogenic shock - improved  # STEMI s/p PCI to LAD in 2023 c/b VT/VF arrest   # S/p ICD 5/8/24  # A fib s/p cardioversion on 9/5/24  # HLD, HTN, & CAD  - CARDS II following  - Monitor hemodynamic status  - Goal MAP 65-85, CVP 8-10  - LVAD parameter changes: Speed decreased to 5200 9/19  - Epi gtt for inotropic support   - Wean today as tolerated  - ICD device interrogation by EP nurse on 9/18  - Continue PTA amiodarone 200 mg daily atorvastatin 80 mg daily  - Continue ASA 81mg  - Start low intensity heparin infusion  - Start Coumadin    GI care:   # Concern for protein-calorie malnutrition  # Hypoalbuminemia  - Nutrition consulted, appreciated recommendations  - Diet: Mechanical soft  -Pull NG tube   - Advance diet as tolerated  - PPI  - Bowel regimen ordered  - Last BM 9/20/24    Renal/ Fluid Balance/  Electrolytes:   # Hyponatremia - improving  - Strict I/O, daily weights  - Avoid/limit nephrotoxins as able  - Replete lytes PRN per protocol  - FVG: euvolemic    # Hx prostate cancer sp Leuprolide and radiation therapy   - Completed Leuprolide on 6/11/24 and radiation therapy on 6/27/24. Last PSA was <0.01 on 7/29/24    Endocrine:    #Stress induced hyperglycemia  Preop A1c 4.8% on 3/13/23  - Sliding scale insulin ordered  - Goal BG <180 for optimal healing  - Hypoglycemia order set in place    ID/ Antibiotics:  # Stress induced leukocytosis  # Prophylactic perioperative antibiotics  - No indications of infections at this time  - Continue to monitor fever curve, WBC and inflammatory markers as appropriate    Heme:     #Acute blood loss anemia  #Acute blood loss thrombocytopenia  #Stress induced leukocytosis  - RTOR POD 0 for bleeding requiring chest washout and significant clot removal  - No s/sx active bleeding.  - Serial CBC ordered  - See anticoagulation plan above in CV    MSK/ Skin:  # Sternotomy  # Surgical Incision  - Sternal precautions  - Postoperative incision management per protocol  - PT/OT/CR    Prophylaxis:    - VTE: Mechanical. SQH.  - PPI     Lines/ tubes/ drains:  - RIJ CVC, PA catheter  - R Radial Arterial Line  - PIVx2 L and R forearm  - CTs x4 (anterior pericardial, anterior mediastinal, mediastinal, pleural)  - Michael  - NG right nare (remove)    Disposition:  - CVICU    Patient seen and findings/plan discussed with medical ICU staff, Dr. Clement.        ====================================    TODAY'S PROGRESS  SUBJECTIVE  NAEON    OBJECTIVE  1. VITAL SIGNS  Temp:  [97.2  F (36.2  C)-99.5  F (37.5  C)] 97.2  F (36.2  C)  Pulse:  [72-99] 87  Resp:  [15-32] 18  BP: (82-89)/(49-75) 89/75  MAP:  [69 mmHg-104 mmHg] 71 mmHg  Arterial Line BP: ()/(59-96) 76/65  SpO2:  [91 %-100 %] 92 %  Resp: 18    2. INTAKE/ OUTPUT  I/O last 3 completed shifts:  In: 1983.3 [P.O.:400; I.V.:703.3; NG/GT:520]  Out:  2505 [Urine:2000; Chest Tube:505]    3. PHYSICAL EXAMINATION  GEN: not in distress  EYES: PERRL, Anicteric sclera.   HEENT:  Normocephalic, atraumatic, trachea midline, Pupils PERRLA  CV: RRR, no gallops, rubs, or murmurs  PULM/CHEST: Clear breath sounds bilaterally upper and diminshed bilateral lower lobes without rhonchi, crackles or wheeze, symmetric chest rise  GI: normal bowel sounds, soft, non-tender, no rebound tenderness or guarding, no masses  : lyn catheter in place, urine yellow and clear  EXTREMITIES: No peripheral edema, moving all extremities, peripheral pulses intact  NEURO: AO x 3, confused to date, forgetful, appropriate to conversation, and following commands  SKIN: Surgical incision well approximated, per-incision natural skin color, and no edema  PSYCH:  Affect: appropriate       4. INVESTIGATIONS  Arterial Blood Gases   Recent Labs   Lab 09/20/24  1437 09/20/24  0335 09/19/24  2232 09/19/24  1534   PH 7.47* 7.48* 7.49* 7.47*   PCO2 39 38 37 38   PO2 124* 136* 173* 76*   HCO3 28 28 28 28     Complete Blood Count   Recent Labs   Lab 09/22/24  0344 09/21/24  0932 09/21/24  0324 09/20/24  1015   WBC 9.5 10.3 9.8 9.4   HGB 7.9* 8.2* 8.0* 8.2*   * 113* 110* 107*     Basic Metabolic Panel  Recent Labs   Lab 09/22/24  0344 09/22/24  0342 09/22/24  0151 09/21/24  2039 09/21/24  0758 09/21/24  0324 09/20/24  0344 09/20/24  0335 09/19/24  1809 09/19/24  1534   *  --   --   --   --  134*  --  132*  132*  --  131*   POTASSIUM 3.9  --   --   --   --  4.1  --  4.5  4.5  --  4.5   CHLORIDE 99  --   --   --   --  101  --  99  99  --  99   CO2 26  --   --   --   --  24  --  26  26  --  26   BUN 27.3*  --   --   --   --  21.3  --  18.6  18.6  --  20.7   CR 0.61*  --   --   --   --  0.62*  --  0.67  0.67  --  0.65*   * 124* 116* 153*   < > 134*   < > 139*  139*   < > 147*    < > = values in this interval not displayed.     Liver Function Tests  Recent Labs   Lab 09/22/24  2618  "09/21/24  0932 09/21/24  0324 09/20/24  0335 09/19/24  1534 09/19/24  0118   AST 28  --  39 55*  --  68*   ALT 19  --  21 24  --  30   ALKPHOS 79  --  76 62  --  64   BILITOTAL 0.6  --  1.2 1.4*  --  3.8*  3.9*   ALBUMIN 2.3*  --  2.3* 2.4*  --  2.9*   INR 1.19* 1.25* 1.28* 1.32*   < > 1.24*    < > = values in this interval not displayed.     Pancreatic Enzymes  No lab results found in last 7 days.  Coagulation Profile  Recent Labs   Lab 09/22/24  0344 09/21/24  0932 09/21/24 0324 09/20/24 0335 09/19/24  1534 09/19/24  0118   INR 1.19* 1.25* 1.28* 1.32* 1.32* 1.24*   PTT  --   --  107* 37 37 36     Lactate  Invalid input(s): \"LACTATE\"    5. RADIOLOGY  09/20/2024  LVAD Echocardiogram   Interpretation Summary  HeartMate 3 LVAD at 5200 RPM.  Normal LV size with severely reduced global LV function, LVEF<20%. LVIDd=4.5  cm.  Moderately dilated RV with moderately reduced function.  The ventricular septum is midline.  The aortic valve remains closed. There is no AI.  LVAD inflow cannula is visualized in the LV apex. LVAD outflow graft is  visualized in the aorta. LVAD inflow cannula velocities were not assessed.  Normal Doppler interrogation of the LVAD outflow graft.  This study was compared with the study from 9/18/24: No significant changes  noted.    09/20/2024  CXR  IMPRESSION:  1.  Stable dense retrocardiac atelectasis/consolidation.  2.  Stable mild pulmonary edema, the left lung field obscured by  overlying devices.  3.  Probable contrast material over the fundus following feeding tube  placement 9/19/2024.    09/19/2024 @ 0542  CXR  IMPRESSION:  Right IJ Collins-Susy catheter terminates 2 cm past its right hilar  intersection, likely terminating over the junction of the right main  pulmonary artery and interlobar artery. Consider slight further  retraction. Otherwise stable chest    09/19/2024 @ 0540  CXR  IMPRESSION:  Repositioned right IJ Collins-Susy catheter terminates over the right  main pulmonary artery. " Otherwise, stable chest.    09/19/2024 @ 0117  IMPRESSION:  1.  Right IJ Weinert-Susy catheter terminates over the right interlobar  artery. Consider partial retraction.  2.  Otherwise stable post surgical chest

## 2024-09-22 NOTE — PROVIDER NOTIFICATION
Turned off Epi per order. 20mins later LVAD Pi 2.0 - 2.1 & MAP 55 - 65. Cards 2 Fellow notified and will to continue to monitor.

## 2024-09-22 NOTE — PROCEDURES
[facility]    Double Lumen PICC Placement    Date/Time: 9/22/2024 3:36 PM    Performed by: Dina Chow RN  Authorized by: Bryon Way PA-C  Indications: vascular access      UNIVERSAL PROTOCOL   Site Marked: Yes  Prior Images Obtained and Reviewed:  Yes  Required items: Required blood products, implants, devices and special equipment available    Patient identity confirmed:  Verbally with patient, arm band, provided demographic data and hospital-assigned identification number  Patient was reevaluated immediately before administering moderate or deep sedation or anesthesia  Confirmation Checklist:  Patient's identity using two indicators, relevant allergies, procedure was appropriate and matched the consent or emergent situation and correct equipment/implants were available  Time out: Immediately prior to the procedure a time out was called    Universal Protocol: the [facility] Universal Protocol was followed    Preparation: Patient was prepped and draped in usual sterile fashion       ANESTHESIA    Anesthesia:  Local infiltration  Local Anesthetic:  Lidocaine 1% without epinephrine  Anesthetic Total (mL):  3.5      SEDATION    Patient Sedated: No        Preparation: skin prepped with ChloraPrep  Skin prep agent: skin prep agent completely dried prior to procedure  Sterile barriers: maximum sterile barriers were used: cap, mask, sterile gown, sterile gloves, and large sterile sheet  Hand hygiene: hand hygiene performed prior to central venous catheter insertion  Type of line used: PICC  Catheter type: double lumen  Lumen type: non-valved and power PICC  Lumen Identification: Purple and Red  Catheter size: 5 Fr  Brand: Bard  Lot number: [id number]  Placement method: venipuncture, ultrasound and MST  Number of attempts: 1  Difficulty threading catheter: no  Successful placement: yes  Orientation: right  Catheter to Vein (%): 27  Location: basilic vein  (0.65 cm vein diameter)  Tip Location: SVC/RA Junction  Arm circumference: adults 10 cm  Extremity circumference: 23  Visible catheter length: 4  Total catheter length: 45  Dressing and securement: adhesive securement device, glue, dressing applied, alcohol impregnated caps, blood cleaned with CHG, chlorhexidine disc applied, site cleansed, subcutaneous anchor securement system, sterile dressing applied and transparent dressing  Post procedure assessment: blood return through all ports, free fluid flow and placement verified by x-ray  PROCEDURE   Patient Tolerance:  Patient tolerated the procedure well with no immediate complications  Disposal: sharps and needle count correct at the end of procedure, needles and guidewire disposed in sharps container

## 2024-09-22 NOTE — PLAN OF CARE
ICU End of Shift Summary. See flowsheets for vital signs and detailed assessment.    Changes this shift:     Neuro: A/O x4. Follows commands.   CV/Hematology: Epi on 0.02 throughout the night. MAP >65. LVAD PI normal limits - see flowsheet. No flow alarms.  Resp: Weaned to room air. Needs incentive spirometer reinforcement.  GI/: Had 9 loose, watery BMs overnight. Adequate UOP at start of shift - trending down this AM. Good appetite last eveniing - had 90% of his dinner. Tube feeding still at trickle rate (15 mL/hr).  Skin: WDL ex incisions. See flowsheet  LDA: Right radial art line, Double lumen, central line via internal jugular MAC.      Plan: Continue plan of care. Recheck heparin anti Xa level.    Goal Outcome Evaluation:    Plan of Care Reviewed With: patient    Overall Patient Progress: improving    Outcome Evaluation: No acute events . Multiple loose BMs.

## 2024-09-22 NOTE — PLAN OF CARE
Shift Events: A&Ox4, redirection needed at times, moves all extremities, Lift to chair today. Ocassionally AV paced 80s, MAP 65 - 85, Epi off, CVP 8 & 10, LVAD with no alarms (Low Pis this AM but resolved throughout day). 1L NC, lung sounds diminished. NG removed and TF stopped d/t increased PO intake and appetite, mechanical/soft diet, multiple loose stools this shift. Michael in place with adequate uop. Ctx4 with consistent output. Heparin continuous and therapeutic. SWAN & RIJ MAC removed. PICC placed.     Plan: Possible floor orders tomorrow (9/23). Delirium precautions in place.    For complete assessments and vital signs, please refer to flowsheets.       Goal Outcome Evaluation:    Plan of Care Reviewed With: patient    Overall Patient Progress: improving    Outcome Evaluation: PICC placed, SWAN& MAC removed, Off Epi. NG removed.

## 2024-09-22 NOTE — PROGRESS NOTES
PICC PLACEMENT.    Patient was unavailable and would like to eat breakfast before procedure, patient was sitting on chair. Primary RN was ok for 1 pm PICC placement.

## 2024-09-23 ENCOUNTER — APPOINTMENT (OUTPATIENT)
Dept: OCCUPATIONAL THERAPY | Facility: CLINIC | Age: 74
DRG: 001 | End: 2024-09-23
Attending: INTERNAL MEDICINE
Payer: MEDICARE

## 2024-09-23 ENCOUNTER — APPOINTMENT (OUTPATIENT)
Dept: PHYSICAL THERAPY | Facility: CLINIC | Age: 74
DRG: 001 | End: 2024-09-23
Attending: INTERNAL MEDICINE
Payer: MEDICARE

## 2024-09-23 ENCOUNTER — APPOINTMENT (OUTPATIENT)
Dept: GENERAL RADIOLOGY | Facility: CLINIC | Age: 74
DRG: 001 | End: 2024-09-23
Attending: INTERNAL MEDICINE
Payer: MEDICARE

## 2024-09-23 LAB
ALBUMIN SERPL BCG-MCNC: 2.2 G/DL (ref 3.5–5.2)
ALP SERPL-CCNC: 71 U/L (ref 40–150)
ALT SERPL W P-5'-P-CCNC: 16 U/L (ref 0–70)
ANION GAP SERPL CALCULATED.3IONS-SCNC: 7 MMOL/L (ref 7–15)
AST SERPL W P-5'-P-CCNC: 23 U/L (ref 0–45)
BASE EXCESS BLDV CALC-SCNC: -4.4 MMOL/L (ref -3–3)
BILIRUB SERPL-MCNC: 0.4 MG/DL
BUN SERPL-MCNC: 30.1 MG/DL (ref 8–23)
CA-I BLD-MCNC: 4.4 MG/DL (ref 4.4–5.2)
CALCIUM SERPL-MCNC: 7.5 MG/DL (ref 8.8–10.4)
CHLORIDE SERPL-SCNC: 100 MMOL/L (ref 98–107)
CREAT SERPL-MCNC: 0.63 MG/DL (ref 0.67–1.17)
EGFRCR SERPLBLD CKD-EPI 2021: >90 ML/MIN/1.73M2
ERYTHROCYTE [DISTWIDTH] IN BLOOD BY AUTOMATED COUNT: 17 % (ref 10–15)
GLUCOSE BLDC GLUCOMTR-MCNC: 113 MG/DL (ref 70–99)
GLUCOSE BLDC GLUCOMTR-MCNC: 121 MG/DL (ref 70–99)
GLUCOSE BLDC GLUCOMTR-MCNC: 134 MG/DL (ref 70–99)
GLUCOSE SERPL-MCNC: 98 MG/DL (ref 70–99)
HCO3 BLDV-SCNC: 20 MMOL/L (ref 21–28)
HCO3 SERPL-SCNC: 26 MMOL/L (ref 22–29)
HCT VFR BLD AUTO: 24.4 % (ref 40–53)
HGB BLD-MCNC: 8 G/DL (ref 13.3–17.7)
INR PPP: 1.29 (ref 0.85–1.15)
LACTATE SERPL-SCNC: 0.6 MMOL/L (ref 0.7–2)
LDH SERPL L TO P-CCNC: 272 U/L (ref 0–250)
MAGNESIUM SERPL-MCNC: 2.2 MG/DL (ref 1.7–2.3)
MCH RBC QN AUTO: 30.9 PG (ref 26.5–33)
MCHC RBC AUTO-ENTMCNC: 32.8 G/DL (ref 31.5–36.5)
MCV RBC AUTO: 94 FL (ref 78–100)
O2/TOTAL GAS SETTING VFR VENT: 24 %
OXYHGB MFR BLDV: 67 % (ref 70–75)
PCO2 BLDV: 30 MM HG (ref 40–50)
PH BLDV: 7.43 [PH] (ref 7.32–7.43)
PHOSPHATE SERPL-MCNC: 2.6 MG/DL (ref 2.5–4.5)
PLATELET # BLD AUTO: 118 10E3/UL (ref 150–450)
PO2 BLDV: 36 MM HG (ref 25–47)
POTASSIUM BLD-SCNC: 2.5 MMOL/L (ref 3.4–5.3)
POTASSIUM SERPL-SCNC: 4.1 MMOL/L (ref 3.4–5.3)
POTASSIUM SERPL-SCNC: 4.2 MMOL/L (ref 3.4–5.3)
PROT SERPL-MCNC: 4.8 G/DL (ref 6.4–8.3)
RBC # BLD AUTO: 2.59 10E6/UL (ref 4.4–5.9)
SAO2 % BLDV: 69 % (ref 70–75)
SODIUM SERPL-SCNC: 133 MMOL/L (ref 135–145)
UFH PPP CHRO-ACNC: 0.28 IU/ML
WBC # BLD AUTO: 10.5 10E3/UL (ref 4–11)

## 2024-09-23 PROCEDURE — 93750 INTERROGATION VAD IN PERSON: CPT | Mod: GC | Performed by: INTERNAL MEDICINE

## 2024-09-23 PROCEDURE — 83615 LACTATE (LD) (LDH) ENZYME: CPT | Performed by: STUDENT IN AN ORGANIZED HEALTH CARE EDUCATION/TRAINING PROGRAM

## 2024-09-23 PROCEDURE — 250N000013 HC RX MED GY IP 250 OP 250 PS 637: Performed by: NURSE PRACTITIONER

## 2024-09-23 PROCEDURE — 250N000013 HC RX MED GY IP 250 OP 250 PS 637

## 2024-09-23 PROCEDURE — 82330 ASSAY OF CALCIUM: CPT | Performed by: SURGERY

## 2024-09-23 PROCEDURE — 97530 THERAPEUTIC ACTIVITIES: CPT | Mod: GP

## 2024-09-23 PROCEDURE — 250N000011 HC RX IP 250 OP 636: Performed by: STUDENT IN AN ORGANIZED HEALTH CARE EDUCATION/TRAINING PROGRAM

## 2024-09-23 PROCEDURE — 85520 HEPARIN ASSAY: CPT | Performed by: INTERNAL MEDICINE

## 2024-09-23 PROCEDURE — 250N000011 HC RX IP 250 OP 636: Performed by: SURGERY

## 2024-09-23 PROCEDURE — 84132 ASSAY OF SERUM POTASSIUM: CPT

## 2024-09-23 PROCEDURE — 250N000013 HC RX MED GY IP 250 OP 250 PS 637: Performed by: SURGERY

## 2024-09-23 PROCEDURE — 97116 GAIT TRAINING THERAPY: CPT | Mod: GP

## 2024-09-23 PROCEDURE — 85610 PROTHROMBIN TIME: CPT

## 2024-09-23 PROCEDURE — 83735 ASSAY OF MAGNESIUM: CPT

## 2024-09-23 PROCEDURE — 120N000002 HC R&B MED SURG/OB UMMC

## 2024-09-23 PROCEDURE — 71045 X-RAY EXAM CHEST 1 VIEW: CPT

## 2024-09-23 PROCEDURE — 71045 X-RAY EXAM CHEST 1 VIEW: CPT | Mod: 26 | Performed by: RADIOLOGY

## 2024-09-23 PROCEDURE — 250N000013 HC RX MED GY IP 250 OP 250 PS 637: Performed by: INTERNAL MEDICINE

## 2024-09-23 PROCEDURE — 250N000013 HC RX MED GY IP 250 OP 250 PS 637: Performed by: STUDENT IN AN ORGANIZED HEALTH CARE EDUCATION/TRAINING PROGRAM

## 2024-09-23 PROCEDURE — 85018 HEMOGLOBIN: CPT

## 2024-09-23 PROCEDURE — 97530 THERAPEUTIC ACTIVITIES: CPT | Mod: GO

## 2024-09-23 PROCEDURE — 82805 BLOOD GASES W/O2 SATURATION: CPT | Performed by: STUDENT IN AN ORGANIZED HEALTH CARE EDUCATION/TRAINING PROGRAM

## 2024-09-23 PROCEDURE — 84100 ASSAY OF PHOSPHORUS: CPT

## 2024-09-23 PROCEDURE — 99232 SBSQ HOSP IP/OBS MODERATE 35: CPT | Mod: 25 | Performed by: INTERNAL MEDICINE

## 2024-09-23 PROCEDURE — 83605 ASSAY OF LACTIC ACID: CPT

## 2024-09-23 PROCEDURE — 84132 ASSAY OF SERUM POTASSIUM: CPT | Performed by: INTERNAL MEDICINE

## 2024-09-23 RX ORDER — WARFARIN SODIUM 5 MG/1
5 TABLET ORAL
Status: COMPLETED | OUTPATIENT
Start: 2024-09-23 | End: 2024-09-23

## 2024-09-23 RX ORDER — ASPIRIN 81 MG/1
81 TABLET, CHEWABLE ORAL DAILY
Status: DISCONTINUED | OUTPATIENT
Start: 2024-09-23 | End: 2024-09-24

## 2024-09-23 RX ADMIN — ATORVASTATIN CALCIUM 80 MG: 80 TABLET, FILM COATED ORAL at 20:08

## 2024-09-23 RX ADMIN — HEPARIN SODIUM 1100 UNITS/HR: 10000 INJECTION, SOLUTION INTRAVENOUS at 15:57

## 2024-09-23 RX ADMIN — WARFARIN SODIUM 5 MG: 5 TABLET ORAL at 18:39

## 2024-09-23 RX ADMIN — CHLORHEXIDINE GLUCONATE 0.12% ORAL RINSE 15 ML: 1.2 LIQUID ORAL at 20:15

## 2024-09-23 RX ADMIN — ESCITALOPRAM OXALATE 10 MG: 10 TABLET ORAL at 09:54

## 2024-09-23 RX ADMIN — PANTOPRAZOLE SODIUM 40 MG: 40 TABLET, DELAYED RELEASE ORAL at 09:54

## 2024-09-23 RX ADMIN — ACETAMINOPHEN 650 MG: 325 TABLET ORAL at 20:13

## 2024-09-23 RX ADMIN — GABAPENTIN 100 MG: 100 CAPSULE ORAL at 22:32

## 2024-09-23 RX ADMIN — CALCIUM GLUCONATE 1 G: 20 INJECTION, SOLUTION INTRAVENOUS at 04:04

## 2024-09-23 RX ADMIN — AMIODARONE HYDROCHLORIDE 200 MG: 200 TABLET ORAL at 09:54

## 2024-09-23 RX ADMIN — THERA TABS 1 TABLET: TAB at 09:54

## 2024-09-23 RX ADMIN — CHLORHEXIDINE GLUCONATE 0.12% ORAL RINSE 15 ML: 1.2 LIQUID ORAL at 09:54

## 2024-09-23 RX ADMIN — ASPIRIN 81 MG CHEWABLE TABLET 81 MG: 81 TABLET CHEWABLE at 16:08

## 2024-09-23 RX ADMIN — POTASSIUM & SODIUM PHOSPHATES POWDER PACK 280-160-250 MG 1 PACKET: 280-160-250 PACK at 09:54

## 2024-09-23 RX ADMIN — POTASSIUM & SODIUM PHOSPHATES POWDER PACK 280-160-250 MG 1 PACKET: 280-160-250 PACK at 04:56

## 2024-09-23 RX ADMIN — Medication 5 MG: at 20:08

## 2024-09-23 ASSESSMENT — ACTIVITIES OF DAILY LIVING (ADL)
ADLS_ACUITY_SCORE: 35
ADLS_ACUITY_SCORE: 39
ADLS_ACUITY_SCORE: 35
ADLS_ACUITY_SCORE: 35
ADLS_ACUITY_SCORE: 39
ADLS_ACUITY_SCORE: 35
ADLS_ACUITY_SCORE: 39
ADLS_ACUITY_SCORE: 35
ADLS_ACUITY_SCORE: 35

## 2024-09-23 NOTE — PLAN OF CARE
Major Shift Events: Forgetful but A & O x4. Not a reliable historian. DDD-paced 75% . HM3 @ 5200 RPM. No alarms. RA. Mechanical soft diet. Eating well. Rectal tube placed for frequent loose BM's. Michael pulled and due to void. See flowsheet for CT output. Heparin continues. Arterial line pulled and 6C orders in place    Plan: Continue with current POC    For vital signs and complete assessments, please see documentation flowsheets.

## 2024-09-23 NOTE — PROGRESS NOTES
CV ICU PROGRESS NOTE  September 22, 2024     Duane C Johnson  [id number]  Admitted: 8/30/2024  2:10 PM      ASSESSMENT: Duane C Johnson is a 74 year old male pmhx of CAD, STEMI s/p PCI to pLAD c/b VT/VF arrest in 2023, HFrEF 2/2 ICM (EF 10-15%), Afib s/p DDV, and s/p ICD placed 5/2024, PAC, HLD, prostate cancer, and anxiety who underwent LVAD placement (HeartMate 3) on 9/18/2024 by Dr. Mulvihill.    Today's Changes:  - FLOOR STATUS  - Rectal tube for loose stools  - Remove mediastinal chest tubes  - Remove R Radial A Line  - Remove Michael    PLAN:  Neuro/ pain/ sedation:  #Acute Postoperative pain  #Anxiety  - Serial neurological examinations  - Delirium precautions  - PTA Lexapro 10 mg  - Gabapentin 100mg at bedtime  - PRN: Tylenol 650 mg q4h, oxycodone 5-10 mg Q4H, Robaxin 750 mg TID    Pulmonary care:   # No acute concerns  # Former tobacco abuse disorder  - Supplemental oxygen > 92% SpO2; currently on 1L NC  - Pulmonary hygiene: Incentive spirometer every 15- 30 minutes when awake, flutter valve,   - Earnestine weaned off 9/20/24  - Extubated 9/20/24    Cardiovascular:    # HFrEF (LVEF 10-15%) S/p LVAD placement (HeartMate 3) on 9/18/24 by Dr. Mulvihill  # Cardiogenic shock - improved  # STEMI s/p PCI to LAD in 2023 c/b VT/VF arrest   # S/p ICD 5/8/24  # A fib s/p cardioversion on 9/5/24  # HLD, HTN, & CAD  - CARDS II following  - Monitor hemodynamic status  - Goal MAP 65-85, CVP 8-10  - Currently off pressors and inotropic agents  - LVAD parameter changes: Speed decreased to 5200 9/19  - ICD device interrogation by EP nurse on 9/18  - PTA amiodarone 200 mg daily atorvastatin 80 mg daily  - ASA 81mg  - Continue low intensity heparin infusion  - Warfarin 5 mg daily  - Remove mediastinal chest tubes today    GI care:   # Concern for protein-calorie malnutrition  # Hypoalbuminemia  - Nutrition consulted, appreciated recommendations  - Diet: Mechanical soft  - Advance regular diet as tolerated  - PPI  - Bowel regimen:  hold scheduled Miralax daily, PRN bisacodyl, senna, MoM  - Rectal tube in setting of loose stools    Renal/ Fluid Balance/ Electrolytes:   # Hyponatremia - improving  - Daily fluid goal net 0  - Strict I/O, daily weights  - Avoid/limit nephrotoxins as able  - Replete lytes PRN per protocol  - FVG: euvolemic    # Hx prostate cancer sp Leuprolide and radiation therapy   - Completed Leuprolide on 6/11/24 and radiation therapy on 6/27/24. Last PSA was <0.01 on 7/29/24    Endocrine:    #Stress induced hyperglycemia  Preop A1c 4.8% on 3/13/23  - Sliding scale insulin ordered  - Goal BG <180 for optimal healing  - Hypoglycemia order set in place    ID/ Antibiotics:  # Stress induced leukocytosis  # Prophylactic perioperative antibiotics  - No indications of infections at this time  - Continue to monitor fever curve, WBC and inflammatory markers as appropriate    Heme:     #Acute blood loss anemia  #Acute blood loss thrombocytopenia  #Stress induced leukocytosis  - RTOR POD 0 for bleeding requiring chest washout and significant clot removal  - Currently, no s/sx active bleeding  - Daily CBC  - See anticoagulation plan above in CV    MSK/ Skin:  # Sternotomy  # Surgical Incision  - Sternal precautions  - Postoperative incision management per protocol  - PT/OT/CR    Prophylaxis:    - VTE: Mechanical. Heparin 1100 Units/hr, warfarin 5 mg  - PPI     Lines/ tubes/ drains:  - R Radial Arterial Line - remove today  - PICC R Basilic  - PIVx2 L and R forearm  - CTs x4 (anterior pericardial, anterior mediastinal, mediastinal, pleural) - remove mediastinal CT today  - Michael    Disposition:  - Ready to transfer to floor    Patient seen and findings/plan discussed with medical ICU staff.    ====================================    TODAY'S PROGRESS  SUBJECTIVE  No acute events overnight. Patient reports feeling better overall. Reports minimal chest pain and surgical incision sites which is well controlled on current regimen. Denies  shortness of breath, abdominal pain, nausea/vomiting. States his appetite is still poor but improving.    OBJECTIVE  1. VITAL SIGNS  Temp:  [97.9  F (36.6  C)-99.5  F (37.5  C)] 98.2  F (36.8  C)  Pulse:  [70-94] 76  Resp:  [13-31] 16  MAP:  [57 mmHg-94 mmHg] 78 mmHg  Arterial Line BP: ()/(13-87) 84/71  SpO2:  [90 %-100 %] 100 %  Resp: 16    2. INTAKE/ OUTPUT  I/O last 3 completed shifts:  In: 1428.68 [P.O.:400; I.V.:635.68; NG/GT:198]  Out: 1650 [Urine:1250; Chest Tube:400]    3. PHYSICAL EXAMINATION  GEN: Lying in bed, alert, no acute distress  EYES: Anicteric sclera.   HEENT:  Normocephalic, atraumatic  CV: RRR, no gallops, rubs, or murmurs  PULM/CHEST: Clear but diminished breath sounds bilaterally without rhonchi, crackles or wheeze, symmetric chest rise  CHEST TUBES: To suction @ -20, serosanguinous output, no air leak  GI: Bowel sounds diminished, soft, non-tender, no rebound tenderness or guarding  EXTREMITIES: 1+ pitting lower edema to shin bilaterally  NEURO: AO x 4, appropriate to conversation, moving all four extremities, strength 5/5 in bilateral upper and lower extremities  SKIN: Surgical incision well approximated, C/D/I, no erythema or drainage  PSYCH:  Affect: appropriate    4. INVESTIGATIONS  Arterial Blood Gases   Recent Labs   Lab 09/20/24  1437 09/20/24  0335 09/19/24  2232 09/19/24  1534   PH 7.47* 7.48* 7.49* 7.47*   PCO2 39 38 37 38   PO2 124* 136* 173* 76*   HCO3 28 28 28 28     Complete Blood Count   Recent Labs   Lab 09/23/24  0041 09/22/24  0344 09/21/24  0932 09/21/24  0324   WBC 10.5 9.5 10.3 9.8   HGB 8.0* 7.9* 8.2* 8.0*   * 115* 113* 110*     Basic Metabolic Panel  Recent Labs   Lab 09/23/24  0318 09/23/24  0112 09/23/24  0110 09/23/24  0041 09/22/24 2017 09/22/24  1557 09/22/24  0813 09/22/24  0344 09/21/24  0758 09/21/24  0324 09/20/24  0344 09/20/24  0335   *  --   --   --   --   --   --  133*  --  134*  --  132*  132*   POTASSIUM 4.1  --  4.2 2.5*  --   --    "--  3.9  --  4.1  --  4.5  4.5   CHLORIDE 100  --   --   --   --   --   --  99  --  101  --  99  99   CO2 26  --   --   --   --   --   --  26  --  24  --  26  26   BUN 30.1*  --   --   --   --   --   --  27.3*  --  21.3  --  18.6  18.6   CR 0.63*  --   --   --   --   --   --  0.61*  --  0.62*  --  0.67  0.67   GLC 98 113*  --   --  140* 110*   < > 127*   < > 134*   < > 139*  139*    < > = values in this interval not displayed.     Liver Function Tests  Recent Labs   Lab 09/23/24 0318 09/22/24 0344 09/21/24  0932 09/21/24  0324 09/20/24  0335   AST 23 28  --  39 55*   ALT 16 19  --  21 24   ALKPHOS 71 79  --  76 62   BILITOTAL 0.4 0.6  --  1.2 1.4*   ALBUMIN 2.2* 2.3*  --  2.3* 2.4*   INR 1.29* 1.19* 1.25* 1.28* 1.32*     Pancreatic Enzymes  No lab results found in last 7 days.  Coagulation Profile  Recent Labs   Lab 09/23/24 0318 09/22/24 0344 09/21/24  0932 09/21/24  0324 09/20/24  0335 09/19/24  1534 09/19/24  0118   INR 1.29* 1.19* 1.25* 1.28* 1.32* 1.32* 1.24*   PTT  --   --   --  107* 37 37 36     Lactate  Invalid input(s): \"LACTATE\"    5. RADIOLOGY  09/23/2024  CXR  IMPRESSION:   1. Amboy-Susy catheter has been removed. Support devices are otherwise  stable.  2. Increased hazy right pulmonary opacities, which may reflect  increased or redistributed layering effusion.    09/20/2024  LVAD Echocardiogram   Interpretation Summary  HeartMate 3 LVAD at 5200 RPM.  Normal LV size with severely reduced global LV function, LVEF<20%. LVIDd=4.5  cm.  Moderately dilated RV with moderately reduced function.  The ventricular septum is midline.  The aortic valve remains closed. There is no AI.  LVAD inflow cannula is visualized in the LV apex. LVAD outflow graft is  visualized in the aorta. LVAD inflow cannula velocities were not assessed.  Normal Doppler interrogation of the LVAD outflow graft.  This study was compared with the study from 9/18/24: No significant changes  noted.    09/20/2024  CXR  IMPRESSION:  1.  " Stable dense retrocardiac atelectasis/consolidation.  2.  Stable mild pulmonary edema, the left lung field obscured by  overlying devices.  3.  Probable contrast material over the fundus following feeding tube  placement 9/19/2024.

## 2024-09-23 NOTE — PROGRESS NOTES
Post-VAD Social Work Services Progress Note    Date of Initial Social Work Evaluation: 09/05/2024   Collaborated with: Patient    Data: Duane was evaluated for LVAD and is remaining inpatient until he receives his VAD implant. He continues to work with therapies and working towards increasing his nutrition prior to LVAD implant. Duane received his LVAD on 9/18/24. Patient was extubated on 9/20 and remains on ICU. He anticipates moving up to 6C in the next day.    Intervention: VAD SW met with patient at the bedside for supportive visit. Inquired into questions or concerns. Patient denied questions or concerns at this time. He informed SW that he walked today for the first time since surgery. Discussed feeling he has been sleeping a little bit better than he was before. Assessed coping. Patient discussed appropriate coping. SW reminded Pt of VAD support group tomorrow and patient stated he would need another reminder tomorrow. SW to provide reminder.    Assessment: Patient was pleasant through discussion. He was sitting upright in his chair upon SW arrival. He made appropriate eye contact. Denied additional questions or support needed at this time.  Education provided by SW: Ongoing SW availability, VAD Support Group  Plan:    Discharge Plans in Progress: [facility] vs Home    Barriers to d/c plan: VAD implant    Follow up Plan: SW to continue to follow for any potential psychosocial concerns pre and post VAD implant.     Jessi Almendarez, MSW, LGSW, APSW  [facility]   [facility]  Ph. [phone]

## 2024-09-23 NOTE — PLAN OF CARE
ICU End of Shift Summary. See flowsheets for vital signs and detailed assessment.    Changes this shift: A/O x4. Follows commands. Mentation improving - delirium precautions continued. Moves all extremities spontaneously. Lung sounds diminished. Afebrile. Occasionally AV paced - Afib underlying rhythm. CVP of 8. Phos replaced this AM - other electrolytes WDL. Good appetite. LVAD with no alarms. Loose, watery stools ongoing. CT with consistent output. Heparin gtt therapeutic.     Plan: Continue plan of care.    Goal Outcome Evaluation:    Plan of Care Reviewed With: patient    Overall Patient Progress: improving    Outcome Evaluation: No acute events.

## 2024-09-23 NOTE — PROGRESS NOTES
D: Stopped by to see patient.   I: Provided education letter to patient and discussed timing of starting VAD education.  Discussed POC and provided support and listened to patient's thoughts and concerns. Will call wife regarding education plan.  P: Continue to follow patient and address any questions or concerns patient and or caregiver may have.  Patient's wife is unable to be at the hospital Wednesday, will plan to start education on Thursday 9/26 10-12 and continue through next week.    Indication of Interrogation:  Perioperative phase, eval hemodynamic fxn    Type of VAD:  Heartmate 3    Current Parameters:  Flow= 2.9 lpm, Speed= 5200 rpm, Power= 3.3 jackson, PI (if applicable)= 7.2    Abnormal Alarm on History:  No    Abnormal Events/Parameters Notes:  History back 4 days, flow range lower today with elevated PI.     Changes Made during Interrogation:  No     Cristobal Laurent MD, PhD  Professor, Heart Failure and Cardiac Transplantation  [facility]

## 2024-09-23 NOTE — PROGRESS NOTES
[facility]  Cardiology II Service / Advanced Heart Failure  Daily Progress Note    Patient: Duane C Johnson      : 1950      MRN: [id number]    Assessment/Plan:   Duane C Johnson is a 74 year old male pmhx of anterior STEMI s/p PCI to the pLAD on 10/26/23 with a VT/VF arrest the next day (10/27) with patent stents and unchanged anatomy on repeat angiogram. Placed on amiodarone and discharged 23, ICD was not indicated as this was within 48h of his MI. His EF prior to discharge was 20-30% with a large area of akinesis in the LAD, placed on apixaban due to this (Apixaban dcd on 24). Has had multiple admissions for HF exacerbation last year.  Admitted on 24. He presents for 2 weeks of worsening fatigue; found to have BNP of 16k and L pleural effusion. Admitted for diuresis and RHC. CPX and RHC showed significant functional limitations due to heart failue. Plan for DT VAD while inpatient. [place] placed on . S/p HM3 on .     : Patient remains hemodynamic stable, no events on LVAD. TTE was done yesterday and aortic valve remained closed.     Recommendations:  - No changes from the CV perspective     # CAD s/p PCI to LAD  # h/o anterior STEMI on 10/26/23 with a VT/VF arrest the next day. Repeat angio showed patent stent on 10/27    # HFrEf 2/2 ICM (EF 15-20% by TTE 24) s/p HM3 on     # Afib DCCV on   # h/o VT/VF arrest in   # ICD placed on 24    SGC # : CVP 7, PA 21/10, PCWP 5, CO/CI 6.5/3/6             : CVP 9, PA 26/12, PCWP 7, CO/CI 6.6/3.6             : CVP 11, PA 28/15, PCWP 12, CO/CI 5/2.7    Fluid status: Euvolemic  RV afterload: off  Inotropes: None   Pressors: none  Anticoagulation: Heparin as a bridge for warfarin.  ASA: Indicated due to STEMI (10/26/2023)  LDH: Stable (ordered today daily)    #The patient's HeartMate 3 LVAD  was interrogated:  Flow (Lpm): 3.4 Lpm  Pulse Index (PI): 4.9 PI  Speed (rpm):  5200 rpm  Power (jackson): 3.3 jackson  Current Hct settin   - The driveline exit site was inspected, c/d/i.   - All external components showed no evidence of damage or malfunction, none replaced.  - Alarms were reviewed, and notable for: NA    Thank you for allowing me to care for this patient, please don't hesitate to contact me with any questions regarding this plan.     Pt was discussed and evaluated with Dr. Laurent, attending physician, who agrees with the assessment and plan above.    Jann Hebert MD  Cardiology Fellow      2024  ==================================    Subjective:     No new issues. Progressing well.     Objective:     Vitals:    24 0400 24 0400 24 0400   Weight: 73.1 kg (161 lb 2.5 oz) 72.6 kg (160 lb 0.9 oz) 71.9 kg (158 lb 8.2 oz)     Vital Signs with Ranges  Temp:  [97.9  F (36.6  C)-99.5  F (37.5  C)] 98.1  F (36.7  C)  Pulse:  [70-91] 91  Resp:  [13-25] 23  MAP:  [77 mmHg-94 mmHg] 88 mmHg  Arterial Line BP: ()/(69-87) 97/80  SpO2:  [91 %-100 %] 97 %  I/O last 3 completed shifts:  In: 1217.08 [P.O.:500; I.V.:552.08; NG/GT:90]  Out: 1395 [Urine:995; Chest Tube:400]    GENERAL:  He does not look in distress   HEENT: Atraumatic, moist mucus membranes  NECK: no JVD  RESP: Clear bilateral occasional cracles at bases b/l  CARDIO: Regular rate and rhythm. extremities warm and well perfused, no peripheral edema  ABD: Non-distended, non-tender, bowel sounds active  NEURO: sedated     Medications   Current Facility-Administered Medications   Medication Dose Route Frequency Provider Last Rate Last Admin    heparin 25,000 units in 0.45% NaCl 250 mL ANTICOAGULANT infusion  0-5,000 Units/hr Intravenous Continuous Noltbreana, NAZIA Slater 11 mL/hr at 24 1200 1,100 Units/hr at 24 1200    Reason beta blocker order not selected   Does not apply DOES NOT GO TO Julien Hill MD         Current Facility-Administered Medications   Medication Dose Route Frequency  Provider Last Rate Last Admin    amiodarone (PACERONE) tablet 200 mg  200 mg Oral or Feeding Tube Daily Collette Virgen APRN CNP   200 mg at 09/23/24 0954    atorvastatin (LIPITOR) tablet 80 mg  80 mg Oral or Feeding Tube QPM Collette Virgen APRN CNP   80 mg at 09/22/24 2008    chlorhexidine (PERIDEX) 0.12 % solution 15 mL  15 mL Swish & Spit BID Frederick Bledsoe APRN CNP   15 mL at 09/23/24 0954    escitalopram (LEXAPRO) tablet 10 mg  10 mg Oral or Feeding Tube Daily Collette Virgen APRN CNP   10 mg at 09/23/24 0954    gabapentin (NEURONTIN) capsule 100 mg  100 mg Oral or Feeding Tube At Bedtime Cristobal Laurent MD   100 mg at 09/22/24 2247    heparin lock flush 10 unit/mL injection 5-20 mL  5-20 mL Intracatheter Q24H Bryon Way PA-C        insulin aspart (NovoLOG) injection (RAPID ACTING)  1-7 Units Subcutaneous TID AC Bryon Way PA-C        insulin aspart (NovoLOG) injection (RAPID ACTING)  1-5 Units Subcutaneous At Bedtime Bryon Way PA-C        melatonin tablet 5 mg  5 mg Oral or Feeding Tube QPM Bryon Way PA-C   5 mg at 09/22/24 2008    multivitamin, therapeutic (THERA-VIT) tablet 1 tablet  1 tablet Oral or Feeding Tube Daily Collette Virgen APRN CNP   1 tablet at 09/23/24 0954    pantoprazole (PROTONIX) EC tablet 40 mg  40 mg Oral QAM AC Cristobal Laurent MD   40 mg at 09/23/24 0954    polyethylene glycol (MIRALAX) Packet 17 g  17 g Oral or Feeding Tube Daily Yessenia Peralta APRN CNP        sodium chloride (PF) 0.9% PF flush 10-40 mL  10-40 mL Intracatheter Q8H Bryon Way PA-C        sodium chloride (PF) 0.9% PF flush 10-40 mL  10-40 mL Intracatheter Q8H NoltBryon toussaint PA-C        sodium chloride (PF) 0.9% PF flush 3 mL  3 mL Intracatheter Q8H Malu, Julien, MD   3 mL at 09/23/24 0501    warfarin ANTICOAGULANT (COUMADIN) tablet 5 mg  5 mg Oral ONCE at 18:00 Cristobal Laurent MD        Warfarin Dose Required Daily - Pharmacist Managed  1 each Oral See Admin  Instructions Bryon Way PA-C           Data   Recent Labs   Lab 09/23/24  0318 09/23/24  0112 09/23/24  0110 09/23/24  0041 09/22/24 2017 09/22/24  0813 09/22/24  0344 09/21/24  1050 09/21/24  0932 09/21/24  0758 09/21/24  0324   WBC  --   --   --  10.5  --   --  9.5  --  10.3  --  9.8   HGB  --   --   --  8.0*  --   --  7.9*  --  8.2*  --  8.0*   MCV  --   --   --  94  --   --  92  --  93  --  94   PLT  --   --   --  118*  --   --  115*  --  113*  --  110*   INR 1.29*  --   --   --   --   --  1.19*  --  1.25*  --  1.28*   *  --   --   --   --   --  133*  --   --   --  134*   POTASSIUM 4.1  --  4.2 2.5*  --   --  3.9  --   --   --  4.1   CHLORIDE 100  --   --   --   --   --  99  --   --   --  101   CO2 26  --   --   --   --   --  26  --   --   --  24   BUN 30.1*  --   --   --   --   --  27.3*  --   --   --  21.3   CR 0.63*  --   --   --   --   --  0.61*  --   --   --  0.62*   ANIONGAP 7  --   --   --   --   --  8  --   --   --  9   PRANAV 7.5*  --   --   --   --   --  7.7*  --   --   --  7.6*   GLC 98 113*  --   --  140*   < > 127*   < >  --    < > 134*   ALBUMIN 2.2*  --   --   --   --   --  2.3*  --   --   --  2.3*   PROTTOTAL 4.8*  --   --   --   --   --  5.0*  --   --   --  4.8*   BILITOTAL 0.4  --   --   --   --   --  0.6  --   --   --  1.2   ALKPHOS 71  --   --   --   --   --  79  --   --   --  76   ALT 16  --   --   --   --   --  19  --   --   --  21   AST 23  --   --   --   --   --  28  --   --   --  39    < > = values in this interval not displayed.     RHC 9/3/24    RA: 11/11/9  RV:39/9  PA:39/16/26  PCWP:16/22/15    Pa Sat:48.2%  Goldy; CO/CI: 2.91/1.71  Thermodilution; CO/CI:3.53/2.08   Right sided filling pressures are normal.   Left sided filling pressures are mildly elevated. Mild elevated pulmonary hypertension.   Reduced cardiac output level.         Echocardiogram Complete   Result Value    LVEF  15-20% (severely reduced)    [facility]    [id number]  [id number]  [id number]  [id number]^ROXANA^SONIA      [facility],[place]  Echocardiography Laboratory  [address] [place] [address]     Name: JOHNSON, DUANE C  MRN: [id number]  : 1950  Study Date: 2024 08:20 AM  Age: 74 yrs  Gender: Male  Patient Location: [facility]  Reason For Study: CHF  Ordering Physician: SONIA SCHMIDT  Performed By: Yessenia Cano MEGHANA     BSA: 1.7 m2  Height: 66 in  Weight: 145 lb  HR: 71  BP: 94/73 mmHg  ______________________________________________________________________________  Procedure  Complete Portable Echo Adult. Contrast Optison. Optison (NDC #7317-8589-35)  given intravenously. Patient was given 6 ml mixture of 3 ml Optison and 6 ml  saline. 3 ml wasted.  ______________________________________________________________________________  Interpretation Summary  Left ventricular function is decreased. The ejection fraction is 15-20%  (severely reduced).  Moderate left ventricular dilation is present.  Apical wall akinesis is present.  Severe diffuse hypokinesis is present.  There is no thrombus seen in the left ventricle.  The right ventricle is normal size.  Global right ventricular function is normal.  Mild functional mitral insufficiency is present.  Mild to moderate tricuspid functional insufficiency is present.  Pulmonary hypertension is present.The right ventricular systolic pressure is  40mmHg above the right atrial pressure.  IVC diameter and respiratory changes fall into an intermediate range  suggesting an RA pressure of 8 mmHg.     This study was compared with the study from 2024 .MR, TR improved. LVEF  similar in both studies compared side to side. So overall no change in LVEF  ______________________________________________________________________________  Left Ventricle  Left ventricular wall thickness is normal. Moderate left ventricular dilation  is present. Left ventricular diastolic function is not assessable. Left  ventricular function is decreased. The ejection  fraction is 15-20% (severely  reduced). Apical wall akinesis is present. Severe diffuse hypokinesis is  present. There is no thrombus seen in the left ventricle.     Right Ventricle  The right ventricle is normal size. Global right ventricular function is  normal. A pacemaker lead is noted in the right ventricle.     Atria  Moderate left atrial enlargement is present. Moderate right atrial enlargement  is present.     Mitral Valve  Mild mitral insufficiency is present.     Aortic Valve  Trileaflet aortic sclerosis without stenosis. On Doppler interrogation, there  is no significant stenosis or regurgitation.     Tricuspid Valve  Mild to moderate tricuspid insufficiency is present. The right ventricular  systolic pressure is approximated at 39.7 mmHg plus the right atrial pressure.  Pulmonary hypertension is present. The right ventricular systolic pressure is  40mmHg above the right atrial pressure.     Pulmonic Valve  On Doppler interrogation, there is no significant stenosis or regurgitation.     Vessels  The aorta root is normal. IVC diameter and respiratory changes fall into an  intermediate range suggesting an RA pressure of 8 mmHg.     Pericardium  No pericardial effusion is present.     Compared to Previous Study  This study was compared with the study from 2024 . MR, TR improved. LVEF  similar in both studies compared side to side. So overall no change in LVEF.  ______________________________________________________________________________  MMode/2D Measurements & Calculations     IVSd: 0.89 cm  LVIDd: 6.0 cm  LVIDs: 5.5 cm  LVPWd: 0.79 cm  FS: 7.1 %  LV mass(C)d: 197.0 grams  LV mass(C)dI: 112.9 grams/m2  LVOT diam: 2.0 cm  LVOT area: 3.2 cm2  EF Biplane: 16.8 %  LA Volume (BP): 71.3 ml  LA Volume Index (BP): 41.0 ml/m2  RV Base: 4.6 cm  RWT: 0.27     TAPSE: 1.0 cm     Doppler Measurements & Calculations  MV E max francisco j: 82.7 cm/sec  LV V1 max P.9 mmHg  LV V1 max: 84.2 cm/sec  LV V1 VTI: 13.8 cm  MR  PISA: 4.2 cm2  MR ERO: 0.33 cm2  MR volume: 35.7 ml  SV(LVOT): 43.8 ml  SI(LVOT): 25.1 ml/m2  PA acc time: 0.08 sec  TR max ivan: 315.1 cm/sec  TR max P.7 mmHg  RV S Ivan: 10.7 cm/sec     ______________________________________________________________________________  Report approved by: Jeanine MEJIA 2024 11:05 AM            Examination: XR CHEST 2 VIEWS 2024 3:06 PM     Indication: Shortness of breath     Comparison: Radiograph 2024. CT 2023.     Findings:  PA and lateral views of the chest. Left chest wall implantable cardiac  defibrillator with grossly intact leads/shock coil. Coronary stenting.  Trachea is midline. Stable mild cardiomegaly. Mild blunting of the  left costophrenic angle, likely representing small pleural effusion.  No pneumothorax. No focal consolidation or any definite abnormal  airspace opacities. No acute or suspicious osseous abnormality.  Unremarkable visualized abdomen.      Impression   Impression:   1. Stable mild cardiomegaly with implantable cardiac defibrillator and  coronary stenting.  2. Small left pleural effusion.     I have personally reviewed the examination and initial interpretation  and I agree with the findings.     PANCHITO EID MD         SYSTEM ID:  [id number]      2023 CMR  Clinical history: 73 year old with anterior STEMI, cardiac arrest in Oct 2023  Comparison CMR: none  1. The left ventricle is severely enlarged. There is wall thinning and akinesis of the mid-distal anterior,  mid anteroseptum, distal septum and the apex  The global systolic function is severely reduced. The LVEF is 28%.  2. The right ventricle is normal in cavity size. The global systolic function is normal. The RVEF is 52%.   3. The right atrium is normal and the left atrium is severely enlarged.  4. There is mild mitral regurgitation.   5. There is transmural late gadolinium enhancement in mid-distal anterior, mid anteroseptum, distal  septum and the apex  consistent with a large infarction in the LAD territory.   6. There is no pericardial effusion.  7. There is no intracardiac thrombus.  8. There are bilateral pleural effusions.  CONCLUSIONS:   Ischemic cardiomyopathy with a large anteroapical infarction.   Severely reduced left ventricular function and normal right ventricular function, LVEF 28% and RVEF 52%.     Cardiac Catheterization:  10/26/23    1st Mrg lesion is 20% stenosed.    Prox LAD to Mid LAD lesion is 100% stenosed.    1st Diag-1 lesion is 80% stenosed.    1st Diag-2 lesion is 50% stenosed.    Dist LAD lesion is 100% stenosed.    RPDA lesion is 70% stenosed.    Dist RCA lesion is 40% stenosed.     Anterior STEMI  Two vessel obstructive CAD (100% pLAD occlusion, 70% rPDA stenosis, 80% diagonal 1 stenosis)  PCI of pLAD to mLAD with BRENDA x 1 (Synergy 2.50x 28 mm) post dilated to 2.75 vessel  CVC placement in RIJ  LVEDP of 26 mmHg  Hemostasis of RRA with TR band      2/19/2024 Echo  Interpretation Summary     1. The left ventricle is moderately dilated. Left ventricular hypertrophy is  noted by two-dimensional echocardiography. Proximal septal thickening is  noted. Left ventricular systolic function is moderate to severely reduced. The  visual ejection fraction is 25-30%. Biplane LVEF is 25%. Diastolic Doppler  findings (E/E' ratio and/or other parameters) suggest left ventricular filling  pressures are increased. There is severe hypokinesis to akinesis of the mid  anterior/anterolateral/anteroseptal/inferoseptal walls and all apical segments  of the left ventricle. There is no thrombus seen in the left ventricle.  2. The right ventricle is normal size. The right ventricular systolic function  is normal.  3. The left atrium is moderately dilated. Right atrial size is normal. There  is no color Doppler evidence of an atrial shunt.  4. There is moderate to mod-severe (2-3+) mitral regurgitation.  5. There is mild to moderate (1-2+) tricuspid  regurgitation.Right ventricular  systolic pressure is elevated, consistent with moderate pulmonary  hypertension.  6. No pericardial effusion.  7. In direct comparison to the previous study dated 10/30/2023, there has been  an interval increase in the degree of mitral regurgitation. The other findings  are similar.     RHC 5/6/24  RA: 10/9/6 mmHg  RV: 61/11 mmHg  PA: 63/24/38 mmHg  PCWP: Unable to obtain accurate measurement  CO/CI (Goldy): 3.89/2.3 L/min/m2    PA sat: 64%  MAP: 85 mmHg       Right sided filling pressures are normal.    Mild elevated pulmonary hypertension.    Normal cardiac output level.      ICD 9/9/24:    Full ICD interrogation performed after MRI complete.  Normal Device Function.   Intrinsic Rhythm: AF w/ VS  bpm  MRI Protection Mode Programmed OFF.  Pacing Programmed From OVO to DDDR  bpm.  ICD Tachy Therapies Programmed ON  Estimated Battery Longevity to RRT= 11 years.  Permanent Programming Changes: None    I have reviewed today's vital signs, notes, medications, labs and imaging.  I have also seen and examined the patient and agree with the findings and plan as outlined above.   Pt with HM3 now off of epi and removal of SG catheter with CT in place.  Pt now with po intake and stable hemodynamics.  Speed at 5200 rpm.      Cristobal Laurent MD, PhD  Professor, Heart Failure and Cardiac Transplantation  [facility]

## 2024-09-24 ENCOUNTER — APPOINTMENT (OUTPATIENT)
Dept: PHYSICAL THERAPY | Facility: CLINIC | Age: 74
DRG: 001 | End: 2024-09-24
Attending: INTERNAL MEDICINE
Payer: MEDICARE

## 2024-09-24 ENCOUNTER — APPOINTMENT (OUTPATIENT)
Dept: OCCUPATIONAL THERAPY | Facility: CLINIC | Age: 74
DRG: 001 | End: 2024-09-24
Attending: INTERNAL MEDICINE
Payer: MEDICARE

## 2024-09-24 ENCOUNTER — APPOINTMENT (OUTPATIENT)
Dept: GENERAL RADIOLOGY | Facility: CLINIC | Age: 74
DRG: 001 | End: 2024-09-24
Attending: INTERNAL MEDICINE
Payer: MEDICARE

## 2024-09-24 LAB
ALBUMIN SERPL BCG-MCNC: 2.3 G/DL (ref 3.5–5.2)
ALP SERPL-CCNC: 69 U/L (ref 40–150)
ALT SERPL W P-5'-P-CCNC: 17 U/L (ref 0–70)
ANION GAP SERPL CALCULATED.3IONS-SCNC: 6 MMOL/L (ref 7–15)
AST SERPL W P-5'-P-CCNC: 24 U/L (ref 0–45)
BILIRUB SERPL-MCNC: 0.4 MG/DL
BUN SERPL-MCNC: 26.8 MG/DL (ref 8–23)
CA-I BLD-MCNC: 4.5 MG/DL (ref 4.4–5.2)
CALCIUM SERPL-MCNC: 7.8 MG/DL (ref 8.8–10.4)
CHLORIDE SERPL-SCNC: 98 MMOL/L (ref 98–107)
CREAT SERPL-MCNC: 0.65 MG/DL (ref 0.67–1.17)
EGFRCR SERPLBLD CKD-EPI 2021: >90 ML/MIN/1.73M2
ERYTHROCYTE [DISTWIDTH] IN BLOOD BY AUTOMATED COUNT: 16.8 % (ref 10–15)
GLUCOSE BLDC GLUCOMTR-MCNC: 109 MG/DL (ref 70–99)
GLUCOSE BLDC GLUCOMTR-MCNC: 93 MG/DL (ref 70–99)
GLUCOSE BLDC GLUCOMTR-MCNC: 95 MG/DL (ref 70–99)
GLUCOSE BLDC GLUCOMTR-MCNC: 97 MG/DL (ref 70–99)
GLUCOSE SERPL-MCNC: 88 MG/DL (ref 70–99)
HCO3 SERPL-SCNC: 27 MMOL/L (ref 22–29)
HCT VFR BLD AUTO: 24 % (ref 40–53)
HGB BLD-MCNC: 7.8 G/DL (ref 13.3–17.7)
INR PPP: 1.75 (ref 0.85–1.15)
LACTATE SERPL-SCNC: 0.8 MMOL/L (ref 0.7–2)
LDH SERPL L TO P-CCNC: 270 U/L (ref 0–250)
MAGNESIUM SERPL-MCNC: 1.9 MG/DL (ref 1.7–2.3)
MCH RBC QN AUTO: 30.8 PG (ref 26.5–33)
MCHC RBC AUTO-ENTMCNC: 32.5 G/DL (ref 31.5–36.5)
MCV RBC AUTO: 95 FL (ref 78–100)
PHOSPHATE SERPL-MCNC: 2.9 MG/DL (ref 2.5–4.5)
PLATELET # BLD AUTO: 164 10E3/UL (ref 150–450)
POTASSIUM SERPL-SCNC: 4.3 MMOL/L (ref 3.4–5.3)
PROT SERPL-MCNC: 5.1 G/DL (ref 6.4–8.3)
RBC # BLD AUTO: 2.53 10E6/UL (ref 4.4–5.9)
SODIUM SERPL-SCNC: 131 MMOL/L (ref 135–145)
UFH PPP CHRO-ACNC: 0.26 IU/ML
WBC # BLD AUTO: 8.6 10E3/UL (ref 4–11)

## 2024-09-24 PROCEDURE — 71045 X-RAY EXAM CHEST 1 VIEW: CPT | Mod: 26 | Performed by: RADIOLOGY

## 2024-09-24 PROCEDURE — 93750 INTERROGATION VAD IN PERSON: CPT | Mod: GC | Performed by: INTERNAL MEDICINE

## 2024-09-24 PROCEDURE — 84100 ASSAY OF PHOSPHORUS: CPT

## 2024-09-24 PROCEDURE — 250N000013 HC RX MED GY IP 250 OP 250 PS 637: Performed by: SURGERY

## 2024-09-24 PROCEDURE — 83615 LACTATE (LD) (LDH) ENZYME: CPT | Performed by: STUDENT IN AN ORGANIZED HEALTH CARE EDUCATION/TRAINING PROGRAM

## 2024-09-24 PROCEDURE — 85610 PROTHROMBIN TIME: CPT

## 2024-09-24 PROCEDURE — 250N000013 HC RX MED GY IP 250 OP 250 PS 637: Performed by: NURSE PRACTITIONER

## 2024-09-24 PROCEDURE — 97116 GAIT TRAINING THERAPY: CPT | Mod: GP

## 2024-09-24 PROCEDURE — 83605 ASSAY OF LACTIC ACID: CPT

## 2024-09-24 PROCEDURE — 85520 HEPARIN ASSAY: CPT | Performed by: INTERNAL MEDICINE

## 2024-09-24 PROCEDURE — 85027 COMPLETE CBC AUTOMATED: CPT

## 2024-09-24 PROCEDURE — 97535 SELF CARE MNGMENT TRAINING: CPT | Mod: GO

## 2024-09-24 PROCEDURE — 250N000013 HC RX MED GY IP 250 OP 250 PS 637

## 2024-09-24 PROCEDURE — 82330 ASSAY OF CALCIUM: CPT | Performed by: SURGERY

## 2024-09-24 PROCEDURE — 120N000005 HC R&B MS OVERFLOW UMMC

## 2024-09-24 PROCEDURE — 99232 SBSQ HOSP IP/OBS MODERATE 35: CPT | Mod: 25 | Performed by: INTERNAL MEDICINE

## 2024-09-24 PROCEDURE — 71045 X-RAY EXAM CHEST 1 VIEW: CPT

## 2024-09-24 PROCEDURE — 250N000011 HC RX IP 250 OP 636: Performed by: INTERNAL MEDICINE

## 2024-09-24 PROCEDURE — 250N000011 HC RX IP 250 OP 636: Performed by: STUDENT IN AN ORGANIZED HEALTH CARE EDUCATION/TRAINING PROGRAM

## 2024-09-24 PROCEDURE — 83735 ASSAY OF MAGNESIUM: CPT

## 2024-09-24 PROCEDURE — 250N000013 HC RX MED GY IP 250 OP 250 PS 637: Performed by: INTERNAL MEDICINE

## 2024-09-24 PROCEDURE — 97530 THERAPEUTIC ACTIVITIES: CPT | Mod: GP

## 2024-09-24 PROCEDURE — 97530 THERAPEUTIC ACTIVITIES: CPT | Mod: GO

## 2024-09-24 PROCEDURE — 80053 COMPREHEN METABOLIC PANEL: CPT

## 2024-09-24 PROCEDURE — 250N000013 HC RX MED GY IP 250 OP 250 PS 637: Performed by: STUDENT IN AN ORGANIZED HEALTH CARE EDUCATION/TRAINING PROGRAM

## 2024-09-24 RX ORDER — MAGNESIUM SULFATE HEPTAHYDRATE 40 MG/ML
2 INJECTION, SOLUTION INTRAVENOUS ONCE
Status: COMPLETED | OUTPATIENT
Start: 2024-09-24 | End: 2024-09-24

## 2024-09-24 RX ORDER — WARFARIN SODIUM 3 MG/1
3 TABLET ORAL
Status: COMPLETED | OUTPATIENT
Start: 2024-09-24 | End: 2024-09-24

## 2024-09-24 RX ORDER — POLYETHYLENE GLYCOL 3350 17 G/17G
17 POWDER, FOR SOLUTION ORAL 2 TIMES DAILY PRN
Status: DISCONTINUED | OUTPATIENT
Start: 2024-09-24 | End: 2024-10-05 | Stop reason: HOSPADM

## 2024-09-24 RX ADMIN — PANTOPRAZOLE SODIUM 40 MG: 40 TABLET, DELAYED RELEASE ORAL at 07:42

## 2024-09-24 RX ADMIN — WARFARIN SODIUM 3 MG: 3 TABLET ORAL at 18:29

## 2024-09-24 RX ADMIN — MAGNESIUM SULFATE HEPTAHYDRATE 2 G: 2 INJECTION, SOLUTION INTRAVENOUS at 07:42

## 2024-09-24 RX ADMIN — Medication 5 MG: at 21:19

## 2024-09-24 RX ADMIN — HEPARIN SODIUM 1100 UNITS/HR: 10000 INJECTION, SOLUTION INTRAVENOUS at 15:25

## 2024-09-24 RX ADMIN — CHLORHEXIDINE GLUCONATE 0.12% ORAL RINSE 15 ML: 1.2 LIQUID ORAL at 07:42

## 2024-09-24 RX ADMIN — CHLORHEXIDINE GLUCONATE 0.12% ORAL RINSE 15 ML: 1.2 LIQUID ORAL at 21:18

## 2024-09-24 RX ADMIN — ACETAMINOPHEN 650 MG: 325 TABLET ORAL at 21:19

## 2024-09-24 RX ADMIN — METHOCARBAMOL TABLETS 750 MG: 750 TABLET, COATED ORAL at 22:11

## 2024-09-24 RX ADMIN — ESCITALOPRAM OXALATE 10 MG: 10 TABLET ORAL at 07:42

## 2024-09-24 RX ADMIN — AMIODARONE HYDROCHLORIDE 200 MG: 200 TABLET ORAL at 07:42

## 2024-09-24 RX ADMIN — ASPIRIN 81 MG CHEWABLE TABLET 81 MG: 81 TABLET CHEWABLE at 07:42

## 2024-09-24 RX ADMIN — ATORVASTATIN CALCIUM 80 MG: 80 TABLET, FILM COATED ORAL at 21:19

## 2024-09-24 RX ADMIN — GABAPENTIN 100 MG: 100 CAPSULE ORAL at 21:19

## 2024-09-24 RX ADMIN — THERA TABS 1 TABLET: TAB at 07:42

## 2024-09-24 ASSESSMENT — ACTIVITIES OF DAILY LIVING (ADL)
ADLS_ACUITY_SCORE: 35
ADLS_ACUITY_SCORE: 39
ADLS_ACUITY_SCORE: 35
ADLS_ACUITY_SCORE: 39
ADLS_ACUITY_SCORE: 39
ADLS_ACUITY_SCORE: 35
ADLS_ACUITY_SCORE: 39
ADLS_ACUITY_SCORE: 35
ADLS_ACUITY_SCORE: 39
ADLS_ACUITY_SCORE: 35
ADLS_ACUITY_SCORE: 39
ADLS_ACUITY_SCORE: 35
ADLS_ACUITY_SCORE: 35
ADLS_ACUITY_SCORE: 39
ADLS_ACUITY_SCORE: 35
ADLS_ACUITY_SCORE: 39
ADLS_ACUITY_SCORE: 35
ADLS_ACUITY_SCORE: 39
ADLS_ACUITY_SCORE: 39

## 2024-09-24 NOTE — PROGRESS NOTES
[facility] ICU PROGRESS NOTE  September 22, 2024     Duane C Johnson  [id number]  Admitted: 8/30/2024  2:10 PM      ASSESSMENT: Duane C Johnson is a 74 year old male pmhx of CAD, STEMI s/p PCI to pLAD c/b VT/VF arrest in 2023, HFrEF 2/2 ICM (EF 10-15%), Afib s/p DDV, and s/p ICD placed 5/2024, PAC, HLD, prostate cancer, and anxiety who underwent LVAD placement (HeartMate 3) on 9/18/2024 by Dr. Mulvihill.    Today's Changes:  - FLOOR STATUS  - Mediastinal chest tubes removed this morning with no complications  - Change bowel regimen to PRN  - Discontinue ASA per cards II recs    PLAN:  Neuro/ pain/ sedation:  #Acute Postoperative pain  #Anxiety  - Serial neurological examinations  - Delirium precautions  - PTA Lexapro 10 mg  - Gabapentin 100mg at bedtime  - PRN: Tylenol 650 mg q4h, oxycodone 5-10 mg Q4H, Robaxin 750 mg TID    Pulmonary care:   # No acute concerns  # Former tobacco abuse disorder  - Supplemental oxygen > 92% SpO2; currently on RA  - Pulmonary hygiene: Incentive spirometer every 15- 30 minutes when awake, flutter valve, C&DB  - Earnestine weaned off 9/20/24  - Extubated 9/20/24    Cardiovascular:    # HFrEF (LVEF 10-15%) S/p LVAD placement (HeartMate 3) on 9/18/24 by Dr. Mulvihill  # Cardiogenic shock - improved  # STEMI s/p PCI to LAD in 2023 c/b VT/VF arrest   # S/p ICD 5/8/24  # A fib s/p cardioversion on 9/5/24  # HLD, HTN, & CAD  - CARDS II following  - Monitor hemodynamic status  - Goal MAP 65-85, CVP 8-10  - Currently off pressors and inotropic agents  - LVAD parameter changes: Speed decreased to 5200 9/19  - ICD device interrogation by EP nurse on 9/18  - PTA amiodarone 200 mg daily atorvastatin 80 mg daily  - Continue low intensity heparin infusion  - Warfarin 5 mg daily  - Discontinue ASA 81mg per cards II - not technically needed for LVAD, hx STEMI >6 months ago, risks outweigh benefits  - Mediastinal chest tubes removed today by CVTS with no complications    GI care:   # Concern for protein-calorie  malnutrition  # Hypoalbuminemia  - Nutrition consulted, appreciated recommendations  - Diet: Mechanical soft  - Advance regular diet as tolerated  - PPI  - Bowel regimen: PRN Miralax, bisacodyl, senna, MoM    Renal/ Fluid Balance/ Electrolytes:   # Hyponatremia - improving  - Daily fluid goal net 0  - Strict I/O, daily weights  - Avoid/limit nephrotoxins as able  - Replete lytes PRN per protocol  - FVG: euvolemic    # Hx prostate cancer sp Leuprolide and radiation therapy   - Completed Leuprolide on 6/11/24 and radiation therapy on 6/27/24. Last PSA was <0.01 on 7/29/24    Endocrine:    #Stress induced hyperglycemia  Preop A1c 4.8% on 3/13/23  - Low intensity sliding scale insulin   - Goal BG <180 for optimal healing  - Hypoglycemia order set in place    ID/ Antibiotics:  # Stress induced leukocytosis  # Prophylactic perioperative antibiotics  - No indications of infections at this time  - Continue to monitor fever curve, WBC and inflammatory markers as appropriate    Heme:     #Acute blood loss anemia  #Acute blood loss thrombocytopenia  #Stress induced leukocytosis  - RTOR POD 0 for bleeding requiring chest washout and significant clot removal  - Currently, no s/sx active bleeding  - Daily CBC  - See anticoagulation plan above in CV    MSK/ Skin:  # Sternotomy  # Surgical Incision  - Sternal precautions  - Postoperative incision management per protocol  - PT/OT/CR    Prophylaxis:    - VTE: Mechanical. Heparin 1100 Units/hr, warfarin 5 mg  - PPI     Lines/ tubes/ drains:  - PICC R Basilic  - PIVx2 L and R forearm  - CTs x2 (anterior pericardial, pleural)    Disposition:  - Ready to transfer to floor    Patient seen and findings/plan discussed with medical ICU staff.    Resident/Fellow Attestation   I, Rajesh Larsen MD, was present with the medical/URIAH student who participated in the service and in the documentation of the note.  I have verified the history and personally performed the physical exam and medical  decision making.  I agree with the assessment and plan of care as documented in the note.          Rajesh Larsen MD  PGY3  Date of Service (when I saw the patient): 09/24/24      ====================================    TODAY'S PROGRESS  SUBJECTIVE  No acute events overnight. Patient reports he slept well. Denies chest pain, shortness of breath, abdominal pain, nausea. States his appetite is improving.    OBJECTIVE  1. VITAL SIGNS  Temp:  [97.9  F (36.6  C)-98.1  F (36.7  C)] 98  F (36.7  C)  Pulse:  [] 89  Resp:  [10-23] 17  BP: (76-92)/(60-80) 76/60  MAP:  [82 mmHg-88 mmHg] 88 mmHg  Arterial Line BP: (90-97)/(72-80) 97/80  SpO2:  [89 %-100 %] 95 %  Resp: 17    2. INTAKE/ OUTPUT  I/O last 3 completed shifts:  In: 726 [P.O.:340; I.V.:386]  Out: 1480 [Urine:650; Stool:100; Chest Tube:730]    3. PHYSICAL EXAMINATION  Physical exam largely unchanged from 9/23/24  GEN: Patient sleeping upon arrival, awakens easily, alert, no acute distress  EYES: Anicteric sclera.   HEENT:  Normocephalic, atraumatic  CV: RRR, S1/S2 present, no gallops, rubs, or murmurs, high pitched motor sound of LVAD present  PULM/CHEST: Clear but diminished breath sounds bilaterally without rhonchi, crackles or wheeze, symmetric chest rise  CHEST TUBES: To suction @ -20, serosanguinous output, no air leak  GI: Bowel sounds diminished, soft, non-tender, no rebound tenderness or guarding  EXTREMITIES: 1+ pitting lower edema to ankle bilaterally  NEURO: AO x 4, appropriate to conversation, moving all four extremities  SKIN: Surgical incision well approximated, C/D/I, no erythema or drainage  PSYCH:  Affect: appropriate    4. INVESTIGATIONS  Arterial Blood Gases   Recent Labs   Lab 09/20/24  1437 09/20/24  0335 09/19/24  2232 09/19/24  1534   PH 7.47* 7.48* 7.49* 7.47*   PCO2 39 38 37 38   PO2 124* 136* 173* 76*   HCO3 28 28 28 28     Complete Blood Count   Recent Labs   Lab 09/24/24  0436 09/23/24  0041 09/22/24  0344 09/21/24  0932   WBC 8.6 10.5  "9.5 10.3   HGB 7.8* 8.0* 7.9* 8.2*    118* 115* 113*     Basic Metabolic Panel  Recent Labs   Lab 09/24/24 0436 09/23/24 2228 09/23/24  1601 09/23/24  0318 09/23/24  0112 09/23/24  0110 09/23/24  0041 09/22/24  0813 09/22/24  0344 09/21/24  0758 09/21/24  0324   *  --   --  133*  --   --   --   --  133*  --  134*   POTASSIUM 4.3  --   --  4.1  --  4.2 2.5*  --  3.9  --  4.1   CHLORIDE 98  --   --  100  --   --   --   --  99  --  101   CO2 27  --   --  26  --   --   --   --  26  --  24   BUN 26.8*  --   --  30.1*  --   --   --   --  27.3*  --  21.3   CR 0.65*  --   --  0.63*  --   --   --   --  0.61*  --  0.62*   GLC 88 134* 121* 98   < >  --   --    < > 127*   < > 134*    < > = values in this interval not displayed.     Liver Function Tests  Recent Labs   Lab 09/24/24 0436 09/23/24 0318 09/22/24 0344 09/21/24  0932 09/21/24  0324   AST 24 23 28  --  39   ALT 17 16 19  --  21   ALKPHOS 69 71 79  --  76   BILITOTAL 0.4 0.4 0.6  --  1.2   ALBUMIN 2.3* 2.2* 2.3*  --  2.3*   INR 1.75* 1.29* 1.19* 1.25* 1.28*     Pancreatic Enzymes  No lab results found in last 7 days.  Coagulation Profile  Recent Labs   Lab 09/24/24 0436 09/23/24 0318 09/22/24 0344 09/21/24  0932 09/21/24  0324 09/20/24  0335 09/19/24  1534 09/19/24  0118   INR 1.75* 1.29* 1.19* 1.25* 1.28* 1.32* 1.32* 1.24*   PTT  --   --   --   --  107* 37 37 36     Lactate  Invalid input(s): \"LACTATE\"    5. RADIOLOGY  09/24/2024  CXR  Impression:   1. Stable support devices.  2. Improving hazy opacities in the right lung.   3. Probable small right-sided pleural effusion.    09/23/2024  CXR  IMPRESSION:   1. [place] catheter has been removed. Support devices are otherwise  stable.  2. Increased hazy right pulmonary opacities, which may reflect  increased or redistributed layering effusion.    09/20/2024  LVAD Echocardiogram   Interpretation Summary  HeartMate 3 LVAD at 5200 RPM.  Normal LV size with severely reduced global LV function, LVEF<20%. " LVIDd=4.5  cm.  Moderately dilated RV with moderately reduced function.  The ventricular septum is midline.  The aortic valve remains closed. There is no AI.  LVAD inflow cannula is visualized in the LV apex. LVAD outflow graft is  visualized in the aorta. LVAD inflow cannula velocities were not assessed.  Normal Doppler interrogation of the LVAD outflow graft.  This study was compared with the study from 9/18/24: No significant changes  noted.    09/20/2024  CXR  IMPRESSION:  1.  Stable dense retrocardiac atelectasis/consolidation.  2.  Stable mild pulmonary edema, the left lung field obscured by  overlying devices.  3.  Probable contrast material over the fundus following feeding tube  placement 9/19/2024.

## 2024-09-24 NOTE — PLAN OF CARE
"Goal Outcome Evaluation:  Major Shift Events:    Neuro:   TORO, follows commands, PERRL. A/O x4. Delirium precautions in place. X1-2 assist    CV:   Rate/rhythm: DDD-paced 30% .  HM3 @ 5200 RPM. PI drops to around 2 with activity.     Pulm: RA    GI/: Mechanical soft diet. Improved appetite. X2 soft stools, voiding.    Skin: midsternal incision. Drive line site with moderate drainage.    Drains: Ctx2, pulled meds 9/24    Drips:   Heparin 1100.    Plan: Awaiting bed on 6C.     For vital signs and complete assessments, please see documentation flowsheets.    Problem: Adult Inpatient Plan of Care  Goal: Plan of Care Review  Description: The Plan of Care Review/Shift note should be completed every shift.  The Outcome Evaluation is a brief statement about your assessment that the patient is improving, declining, or no change.  This information will be displayed automatically on your shift  note.  Outcome: Progressing  Goal: Patient-Specific Goal (Individualized)  Description: You can add care plan individualizations to a care plan. Examples of Individualization might be:  \"Parent requests to be called daily at 9am for status\", \"I have a hard time hearing out of my right ear\", or \"Do not touch me to wake me up as it startles  me\".  Outcome: Progressing  Goal: Absence of Hospital-Acquired Illness or Injury  Outcome: Progressing  Intervention: Identify and Manage Fall Risk  Recent Flowsheet Documentation  Taken 9/24/2024 1600 by Zuleyma John, RN  Safety Promotion/Fall Prevention:   activity supervised   clutter free environment maintained   increased rounding and observation   increase visualization of patient   nonskid shoes/slippers when out of bed   patient and family education   treat reversible contributory factors   treat underlying cause  Taken 9/24/2024 0800 by Zuleyma John, RN  Safety Promotion/Fall Prevention:   activity supervised   clutter free environment maintained   increased rounding and " observation   increase visualization of patient   nonskid shoes/slippers when out of bed   patient and family education   treat reversible contributory factors   treat underlying cause  Intervention: Prevent Skin Injury  Recent Flowsheet Documentation  Taken 9/24/2024 1600 by Zuleyma John RN  Body Position:   turned   right   side-lying  Skin Protection:   skin to device areas padded   skin to skin areas padded   transparent dressing maintained  Taken 9/24/2024 0800 by Zuleyma John RN  Body Position: supine  Skin Protection:   skin to device areas padded   skin to skin areas padded   transparent dressing maintained  Intervention: Prevent and Manage VTE (Venous Thromboembolism) Risk  Recent Flowsheet Documentation  Taken 9/24/2024 1600 by Zuleyma John RN  VTE Prevention/Management: SCDs off (sequential compression devices)  Taken 9/24/2024 0800 by Zuleyma John RN  VTE Prevention/Management: SCDs off (sequential compression devices)  Intervention: Prevent Infection  Recent Flowsheet Documentation  Taken 9/24/2024 1600 by Zuleyma John RN  Infection Prevention:   equipment surfaces disinfected   hand hygiene promoted   rest/sleep promoted  Taken 9/24/2024 0800 by Zuleyma John RN  Infection Prevention:   equipment surfaces disinfected   hand hygiene promoted   rest/sleep promoted  Goal: Optimal Comfort and Wellbeing  Outcome: Progressing  Intervention: Monitor Pain and Promote Comfort  Recent Flowsheet Documentation  Taken 9/24/2024 1600 by Zuleyma John RN  Pain Management Interventions:   care clustered   relaxation techniques promoted   repositioned   rest  Taken 9/24/2024 1200 by Zuleyma John RN  Pain Management Interventions:   care clustered   quiet environment facilitated   relaxation techniques promoted   repositioned   rest  Taken 9/24/2024 0800 by Zuleyma John RN  Pain Management Interventions:   care clustered   relaxation techniques promoted    repositioned   rest  Intervention: Provide Person-Centered Care  Recent Flowsheet Documentation  Taken 9/24/2024 1600 by Zuleyma John, RN  Trust Relationship/Rapport:   care explained   emotional support provided   empathic listening provided   reassurance provided   thoughts/feelings acknowledged  Taken 9/24/2024 0800 by Zuleyma John, RN  Trust Relationship/Rapport:   care explained   emotional support provided   empathic listening provided   reassurance provided   thoughts/feelings acknowledged  Goal: Readiness for Transition of Care  Outcome: Progressing

## 2024-09-24 NOTE — PROGRESS NOTES
[facility]  Cardiology II Service / Advanced Heart Failure  Daily Progress Note    Patient: Duane C Johnson      : 1950      MRN: [id number]    Assessment/Plan:   Duane C Johnson is a 74 year old male pmhx of anterior STEMI s/p PCI to the pLAD on 10/26/23 with a VT/VF arrest the next day (10/27) with patent stents and unchanged anatomy on repeat angiogram. Placed on amiodarone and discharged 23, ICD was not indicated as this was within 48h of his MI. His EF prior to discharge was 20-30% with a large area of akinesis in the LAD, placed on apixaban due to this (Apixaban dcd on 24). Has had multiple admissions for HF exacerbation last year.  Admitted on 24. He presents for 2 weeks of worsening fatigue; found to have BNP of 16k and L pleural effusion. Admitted for diuresis and RHC. CPX and RHC showed significant functional limitations due to heart failue. Plan for DT VAD while inpatient. Basye placed on . S/p HM3 on .     : Patient remains hemodynamic stable, no events on LVAD.    Recommendations:  - ASA discontinued. He does not technically need it for the LVAD, and patient had STEMI more than 6 months ago. Risk out weight the benefits.     # CAD s/p PCI to LAD  # h/o anterior STEMI on 10/26/23 with a VT/VF arrest the next day. Repeat angio showed patent stent on 10/27    # HFrEf 2/2 ICM (EF 15-20% by TTE 24) s/p HM3 on     # Afib DCCV on   # h/o VT/VF arrest in   # ICD placed on 24    SGC # : CVP 7, PA 21/10, PCWP 5, CO/CI 6.5/3/6             : CVP 9, PA 26/12, PCWP 7, CO/CI 6.6/3.6             : CVP 11, PA 28/15, PCWP 12, CO/CI 5/2.7    Fluid status: Euvolemic  RV afterload: off  Inotropes: None   Pressors: none  Anticoagulation: Heparin as a bridge for warfarin.  ASA: Discontinued on   LDH: Stable (ordered today daily)    #The patient's HeartMate 3 LVAD  was interrogated:  Flow (Lpm): 3.4 Lpm  Pulse  Index (PI): 5.1 PI  Speed (rpm): 5200 rpm  Power (jackson): 3.4 jackson  Current Hct settin   - The driveline exit site was inspected, c/d/i.   - All external components showed no evidence of damage or malfunction, none replaced.  - Alarms were reviewed, and notable for: NA    Thank you for allowing me to care for this patient, please don't hesitate to contact me with any questions regarding this plan.     Pt was discussed and evaluated with Dr. Laurent, attending physician, who agrees with the assessment and plan above.    Jann Hebert MD  Cardiology Fellow      2024  ==================================    Subjective:     No new issues. Progressing well.     Objective:     Vitals:    24 0400 24 0400 24 0400   Weight: 73.1 kg (161 lb 2.5 oz) 72.6 kg (160 lb 0.9 oz) 71.9 kg (158 lb 8.2 oz)     Vital Signs with Ranges  Temp:  [98  F (36.7  C)-98.4  F (36.9  C)] 98.4  F (36.9  C)  Pulse:  [] 93  Resp:  [10-23] 15  BP: (76-92)/(60-80) 76/60  SpO2:  [89 %-100 %] 94 %  I/O last 3 completed shifts:  In: 528 [P.O.:240; I.V.:288]  Out: 1265 [Urine:505; Stool:100; Chest Tube:660]    GENERAL:  He does not look in distress   HEENT: Atraumatic, moist mucus membranes  NECK: no JVD  RESP: Clear bilateral occasional cracles at bases b/l  CARDIO: Regular rate and rhythm. extremities warm and well perfused, no peripheral edema  ABD: Non-distended, non-tender, bowel sounds active  NEURO: sedated     Medications   Current Facility-Administered Medications   Medication Dose Route Frequency Provider Last Rate Last Admin    heparin 25,000 units in 0.45% NaCl 250 mL ANTICOAGULANT infusion  0-5,000 Units/hr Intravenous Continuous Noelvi, NAZIA Slater 11 mL/hr at 24 1000 1,100 Units/hr at 24 1000    Reason beta blocker order not selected   Does not apply DOES NOT GO TO Julien Hill MD         Current Facility-Administered Medications   Medication Dose Route Frequency Provider Last Rate  Last Admin    amiodarone (PACERONE) tablet 200 mg  200 mg Oral or Feeding Tube Daily Collette Virgen APRN CNP   200 mg at 09/24/24 0742    atorvastatin (LIPITOR) tablet 80 mg  80 mg Oral or Feeding Tube QPM Collette Virgen APRN CNP   80 mg at 09/23/24 2008    chlorhexidine (PERIDEX) 0.12 % solution 15 mL  15 mL Swish & Spit BID Frederick Bledsoe APRN CNP   15 mL at 09/24/24 0742    escitalopram (LEXAPRO) tablet 10 mg  10 mg Oral or Feeding Tube Daily Collette Virgen APRN CNP   10 mg at 09/24/24 0742    gabapentin (NEURONTIN) capsule 100 mg  100 mg Oral or Feeding Tube At Bedtime Cristobal Laurent MD   100 mg at 09/23/24 2232    heparin lock flush 10 unit/mL injection 5-20 mL  5-20 mL Intracatheter Q24H Bryon Way PA-C        insulin aspart (NovoLOG) injection (RAPID ACTING)  1-7 Units Subcutaneous TID AC Bryon Way PA-C        insulin aspart (NovoLOG) injection (RAPID ACTING)  1-5 Units Subcutaneous At Bedtime Bryon Way PA-C        melatonin tablet 5 mg  5 mg Oral or Feeding Tube QPM Bryon Way PA-C   5 mg at 09/23/24 2008    multivitamin, therapeutic (THERA-VIT) tablet 1 tablet  1 tablet Oral or Feeding Tube Daily Collette Virgen APRN CNP   1 tablet at 09/24/24 0742    pantoprazole (PROTONIX) EC tablet 40 mg  40 mg Oral QAM AC Cristobal Laurent MD   40 mg at 09/24/24 0742    sodium chloride (PF) 0.9% PF flush 10-40 mL  10-40 mL Intracatheter Q8H NoltingBryon PA-C   10 mL at 09/24/24 0808    sodium chloride (PF) 0.9% PF flush 10-40 mL  10-40 mL Intracatheter Q8H Nolting, VERO SlaterC   10 mL at 09/24/24 0808    sodium chloride (PF) 0.9% PF flush 3 mL  3 mL Intracatheter Q8H Julien Toribio MD   3 mL at 09/24/24 0742    Warfarin Dose Required Daily - Pharmacist Managed  1 each Oral See Admin Instructions Bryon Way PA-C           Data   Recent Labs   Lab 09/24/24  0752 09/24/24  0436 09/23/24  2228 09/23/24  1601 09/23/24  0318 09/23/24  0112 09/23/24  0110 09/23/24  0041  24  0813 24  0344   WBC  --  8.6  --   --   --   --   --  10.5  --  9.5   HGB  --  7.8*  --   --   --   --   --  8.0*  --  7.9*   MCV  --  95  --   --   --   --   --  94  --  92   PLT  --  164  --   --   --   --   --  118*  --  115*   INR  --  1.75*  --   --  1.29*  --   --   --   --  1.19*   NA  --  131*  --   --  133*  --   --   --   --  133*   POTASSIUM  --  4.3  --   --  4.1  --  4.2 2.5*  --  3.9   CHLORIDE  --  98  --   --  100  --   --   --   --  99   CO2  --  27  --   --  26  --   --   --   --  26   BUN  --  26.8*  --   --  30.1*  --   --   --   --  27.3*   CR  --  0.65*  --   --  0.63*  --   --   --   --  0.61*   ANIONGAP  --  6*  --   --  7  --   --   --   --  8   PRANAV  --  7.8*  --   --  7.5*  --   --   --   --  7.7*   GLC 93 88 134*   < > 98   < >  --   --    < > 127*   ALBUMIN  --  2.3*  --   --  2.2*  --   --   --   --  2.3*   PROTTOTAL  --  5.1*  --   --  4.8*  --   --   --   --  5.0*   BILITOTAL  --  0.4  --   --  0.4  --   --   --   --  0.6   ALKPHOS  --  69  --   --  71  --   --   --   --  79   ALT  --  17  --   --  16  --   --   --   --  19   AST  --  24  --   --  23  --   --   --   --  28    < > = values in this interval not displayed.     RHC 9/3/24    RA:   RV:  PA:  PCWP:16/22/15    Pa Sat:48.2%  Goldy; CO/CI: 2.91/1.71  Thermodilution; CO/CI:3.53/2.08   Right sided filling pressures are normal.   Left sided filling pressures are mildly elevated. Mild elevated pulmonary hypertension.   Reduced cardiac output level.         Echocardiogram Complete   Result Value    LVEF  15-20% (severely reduced)    [facility]    [id number]  [facility]  [id number]  [id number]^ROXANA^SONIA     [facility]  Echocardiography Laboratory  [address] [place] [id number]     Name: JOHNSON, DUANE C  MRN: [id number]  : 1950  Study Date: 2024 08:20 AM  Age: 74 yrs  Gender: Male  Patient Location: [facility]  Reason For Study: CHF  Ordering  Physician: SONIA SCHMIDT  Performed By: Yessenia Cano RDCS     BSA: 1.7 m2  Height: 66 in  Weight: 145 lb  HR: 71  BP: 94/73 mmHg  ______________________________________________________________________________  Procedure  Complete Portable Echo Adult. Contrast Optison. Optison (NDC #5075-3687-37)  given intravenously. Patient was given 6 ml mixture of 3 ml Optison and 6 ml  saline. 3 ml wasted.  ______________________________________________________________________________  Interpretation Summary  Left ventricular function is decreased. The ejection fraction is 15-20%  (severely reduced).  Moderate left ventricular dilation is present.  Apical wall akinesis is present.  Severe diffuse hypokinesis is present.  There is no thrombus seen in the left ventricle.  The right ventricle is normal size.  Global right ventricular function is normal.  Mild functional mitral insufficiency is present.  Mild to moderate tricuspid functional insufficiency is present.  Pulmonary hypertension is present.The right ventricular systolic pressure is  40mmHg above the right atrial pressure.  IVC diameter and respiratory changes fall into an intermediate range  suggesting an RA pressure of 8 mmHg.     This study was compared with the study from 2/19/2024 .MR, TR improved. LVEF  similar in both studies compared side to side. So overall no change in LVEF  ______________________________________________________________________________  Left Ventricle  Left ventricular wall thickness is normal. Moderate left ventricular dilation  is present. Left ventricular diastolic function is not assessable. Left  ventricular function is decreased. The ejection fraction is 15-20% (severely  reduced). Apical wall akinesis is present. Severe diffuse hypokinesis is  present. There is no thrombus seen in the left ventricle.     Right Ventricle  The right ventricle is normal size. Global right ventricular function is  normal. A pacemaker lead is noted in the  right ventricle.     Atria  Moderate left atrial enlargement is present. Moderate right atrial enlargement  is present.     Mitral Valve  Mild mitral insufficiency is present.     Aortic Valve  Trileaflet aortic sclerosis without stenosis. On Doppler interrogation, there  is no significant stenosis or regurgitation.     Tricuspid Valve  Mild to moderate tricuspid insufficiency is present. The right ventricular  systolic pressure is approximated at 39.7 mmHg plus the right atrial pressure.  Pulmonary hypertension is present. The right ventricular systolic pressure is  40mmHg above the right atrial pressure.     Pulmonic Valve  On Doppler interrogation, there is no significant stenosis or regurgitation.     Vessels  The aorta root is normal. IVC diameter and respiratory changes fall into an  intermediate range suggesting an RA pressure of 8 mmHg.     Pericardium  No pericardial effusion is present.     Compared to Previous Study  This study was compared with the study from 2024 . MR, TR improved. LVEF  similar in both studies compared side to side. So overall no change in LVEF.  ______________________________________________________________________________  MMode/2D Measurements & Calculations     IVSd: 0.89 cm  LVIDd: 6.0 cm  LVIDs: 5.5 cm  LVPWd: 0.79 cm  FS: 7.1 %  LV mass(C)d: 197.0 grams  LV mass(C)dI: 112.9 grams/m2  LVOT diam: 2.0 cm  LVOT area: 3.2 cm2  EF Biplane: 16.8 %  LA Volume (BP): 71.3 ml  LA Volume Index (BP): 41.0 ml/m2  RV Base: 4.6 cm  RWT: 0.27     TAPSE: 1.0 cm     Doppler Measurements & Calculations  MV E max ivan: 82.7 cm/sec  LV V1 max P.9 mmHg  LV V1 max: 84.2 cm/sec  LV V1 VTI: 13.8 cm  MR PISA: 4.2 cm2  MR ERO: 0.33 cm2  MR volume: 35.7 ml  SV(LVOT): 43.8 ml  SI(LVOT): 25.1 ml/m2  PA acc time: 0.08 sec  TR max ivan: 315.1 cm/sec  TR max P.7 mmHg  RV S Ivan: 10.7 cm/sec     ______________________________________________________________________________  Report approved by: JAMES  Jeanine NEVAREZ 08/31/2024 11:05 AM            Examination: XR CHEST 2 VIEWS 8/30/2024 3:06 PM     Indication: Shortness of breath     Comparison: Radiograph 8/24/2024. CT 12/25/2023.     Findings:  PA and lateral views of the chest. Left chest wall implantable cardiac  defibrillator with grossly intact leads/shock coil. Coronary stenting.  Trachea is midline. Stable mild cardiomegaly. Mild blunting of the  left costophrenic angle, likely representing small pleural effusion.  No pneumothorax. No focal consolidation or any definite abnormal  airspace opacities. No acute or suspicious osseous abnormality.  Unremarkable visualized abdomen.      Impression   Impression:   1. Stable mild cardiomegaly with implantable cardiac defibrillator and  coronary stenting.  2. Small left pleural effusion.     I have personally reviewed the examination and initial interpretation  and I agree with the findings.     PANCHITO EID MD         SYSTEM ID:  [id number]      12/6/2023 CMR  Clinical history: 73 year old with anterior STEMI, cardiac arrest in Oct 2023  Comparison CMR: none  1. The left ventricle is severely enlarged. There is wall thinning and akinesis of the mid-distal anterior,  mid anteroseptum, distal septum and the apex  The global systolic function is severely reduced. The LVEF is 28%.  2. The right ventricle is normal in cavity size. The global systolic function is normal. The RVEF is 52%.   3. The right atrium is normal and the left atrium is severely enlarged.  4. There is mild mitral regurgitation.   5. There is transmural late gadolinium enhancement in mid-distal anterior, mid anteroseptum, distal  septum and the apex consistent with a large infarction in the LAD territory.   6. There is no pericardial effusion.  7. There is no intracardiac thrombus.  8. There are bilateral pleural effusions.  CONCLUSIONS:   Ischemic cardiomyopathy with a large anteroapical infarction.   Severely reduced left ventricular function and  normal right ventricular function, LVEF 28% and RVEF 52%.     Cardiac Catheterization:  10/26/23    1st Mrg lesion is 20% stenosed.    Prox LAD to Mid LAD lesion is 100% stenosed.    1st Diag-1 lesion is 80% stenosed.    1st Diag-2 lesion is 50% stenosed.    Dist LAD lesion is 100% stenosed.    RPDA lesion is 70% stenosed.    Dist RCA lesion is 40% stenosed.     Anterior STEMI  Two vessel obstructive CAD (100% pLAD occlusion, 70% rPDA stenosis, 80% diagonal 1 stenosis)  PCI of pLAD to mLAD with BRENDA x 1 (Synergy 2.50x 28 mm) post dilated to 2.75 vessel  CVC placement in RIJ  LVEDP of 26 mmHg  Hemostasis of RRA with TR band      2/19/2024 Echo  Interpretation Summary     1. The left ventricle is moderately dilated. Left ventricular hypertrophy is  noted by two-dimensional echocardiography. Proximal septal thickening is  noted. Left ventricular systolic function is moderate to severely reduced. The  visual ejection fraction is 25-30%. Biplane LVEF is 25%. Diastolic Doppler  findings (E/E' ratio and/or other parameters) suggest left ventricular filling  pressures are increased. There is severe hypokinesis to akinesis of the mid  anterior/anterolateral/anteroseptal/inferoseptal walls and all apical segments  of the left ventricle. There is no thrombus seen in the left ventricle.  2. The right ventricle is normal size. The right ventricular systolic function  is normal.  3. The left atrium is moderately dilated. Right atrial size is normal. There  is no color Doppler evidence of an atrial shunt.  4. There is moderate to mod-severe (2-3+) mitral regurgitation.  5. There is mild to moderate (1-2+) tricuspid regurgitation.Right ventricular  systolic pressure is elevated, consistent with moderate pulmonary  hypertension.  6. No pericardial effusion.  7. In direct comparison to the previous study dated 10/30/2023, there has been  an interval increase in the degree of mitral regurgitation. The other findings  are similar.      RHC 5/6/24  RA: 10/9/6 mmHg  RV: 61/11 mmHg  PA: 63/24/38 mmHg  PCWP: Unable to obtain accurate measurement  CO/CI (Goldy): 3.89/2.3 L/min/m2    PA sat: 64%  MAP: 85 mmHg       Right sided filling pressures are normal.    Mild elevated pulmonary hypertension.    Normal cardiac output level.      ICD 9/9/24:    Full ICD interrogation performed after MRI complete.  Normal Device Function.   Intrinsic Rhythm: AF w/ VS  bpm  MRI Protection Mode Programmed OFF.  Pacing Programmed From OVO to DDDR  bpm.  ICD Tachy Therapies Programmed ON  Estimated Battery Longevity to RRT= 11 years.  Permanent Programming Changes: None    I have reviewed today's vital signs, notes, medications, labs and imaging.  I have also seen and examined the patient and agree with the findings and plan as outlined above.   Pt with HM3 now off of epi and removal of SG catheter with CT in place.  Pt now with po intake and stable hemodynamics.  Speed at 5200 rpm.      Cristobal Laurent MD, PhD  Professor, Heart Failure and Cardiac Transplantation  [facility]

## 2024-09-25 ENCOUNTER — APPOINTMENT (OUTPATIENT)
Dept: PHYSICAL THERAPY | Facility: CLINIC | Age: 74
DRG: 001 | End: 2024-09-25
Attending: INTERNAL MEDICINE
Payer: MEDICARE

## 2024-09-25 ENCOUNTER — APPOINTMENT (OUTPATIENT)
Dept: GENERAL RADIOLOGY | Facility: CLINIC | Age: 74
DRG: 001 | End: 2024-09-25
Attending: INTERNAL MEDICINE
Payer: MEDICARE

## 2024-09-25 LAB
ALBUMIN SERPL BCG-MCNC: 2.3 G/DL (ref 3.5–5.2)
ALP SERPL-CCNC: 68 U/L (ref 40–150)
ALT SERPL W P-5'-P-CCNC: 15 U/L (ref 0–70)
ANION GAP SERPL CALCULATED.3IONS-SCNC: 3 MMOL/L (ref 7–15)
AST SERPL W P-5'-P-CCNC: 23 U/L (ref 0–45)
BILIRUB SERPL-MCNC: 0.4 MG/DL
BUN SERPL-MCNC: 27.5 MG/DL (ref 8–23)
CALCIUM SERPL-MCNC: 7.8 MG/DL (ref 8.8–10.4)
CHLORIDE SERPL-SCNC: 101 MMOL/L (ref 98–107)
CREAT SERPL-MCNC: 0.64 MG/DL (ref 0.67–1.17)
EGFRCR SERPLBLD CKD-EPI 2021: >90 ML/MIN/1.73M2
ERYTHROCYTE [DISTWIDTH] IN BLOOD BY AUTOMATED COUNT: 16.5 % (ref 10–15)
GLUCOSE BLDC GLUCOMTR-MCNC: 115 MG/DL (ref 70–99)
GLUCOSE BLDC GLUCOMTR-MCNC: 93 MG/DL (ref 70–99)
GLUCOSE SERPL-MCNC: 90 MG/DL (ref 70–99)
HCO3 SERPL-SCNC: 26 MMOL/L (ref 22–29)
HCT VFR BLD AUTO: 24.5 % (ref 40–53)
HGB BLD-MCNC: 7.9 G/DL (ref 13.3–17.7)
INR PPP: 2.91 (ref 0.85–1.15)
INR PPP: 2.93 (ref 0.85–1.15)
LDH SERPL L TO P-CCNC: 254 U/L (ref 0–250)
MAGNESIUM SERPL-MCNC: 2 MG/DL (ref 1.7–2.3)
MCH RBC QN AUTO: 30.9 PG (ref 26.5–33)
MCHC RBC AUTO-ENTMCNC: 32.2 G/DL (ref 31.5–36.5)
MCV RBC AUTO: 96 FL (ref 78–100)
PHOSPHATE SERPL-MCNC: 3.1 MG/DL (ref 2.5–4.5)
PLATELET # BLD AUTO: 196 10E3/UL (ref 150–450)
POTASSIUM SERPL-SCNC: 4.2 MMOL/L (ref 3.4–5.3)
PROT SERPL-MCNC: 5.3 G/DL (ref 6.4–8.3)
RBC # BLD AUTO: 2.56 10E6/UL (ref 4.4–5.9)
SODIUM SERPL-SCNC: 130 MMOL/L (ref 135–145)
UFH PPP CHRO-ACNC: 0.33 IU/ML
WBC # BLD AUTO: 6.7 10E3/UL (ref 4–11)

## 2024-09-25 PROCEDURE — 85610 PROTHROMBIN TIME: CPT | Performed by: NURSE PRACTITIONER

## 2024-09-25 PROCEDURE — 71045 X-RAY EXAM CHEST 1 VIEW: CPT | Mod: 26 | Performed by: RADIOLOGY

## 2024-09-25 PROCEDURE — 250N000011 HC RX IP 250 OP 636: Performed by: INTERNAL MEDICINE

## 2024-09-25 PROCEDURE — 85610 PROTHROMBIN TIME: CPT

## 2024-09-25 PROCEDURE — 250N000013 HC RX MED GY IP 250 OP 250 PS 637: Performed by: NURSE PRACTITIONER

## 2024-09-25 PROCEDURE — 85520 HEPARIN ASSAY: CPT | Performed by: INTERNAL MEDICINE

## 2024-09-25 PROCEDURE — 250N000013 HC RX MED GY IP 250 OP 250 PS 637: Performed by: INTERNAL MEDICINE

## 2024-09-25 PROCEDURE — 83735 ASSAY OF MAGNESIUM: CPT

## 2024-09-25 PROCEDURE — 99232 SBSQ HOSP IP/OBS MODERATE 35: CPT | Mod: 25 | Performed by: NURSE PRACTITIONER

## 2024-09-25 PROCEDURE — 97116 GAIT TRAINING THERAPY: CPT | Mod: GP | Performed by: REHABILITATION PRACTITIONER

## 2024-09-25 PROCEDURE — 83615 LACTATE (LD) (LDH) ENZYME: CPT | Performed by: STUDENT IN AN ORGANIZED HEALTH CARE EDUCATION/TRAINING PROGRAM

## 2024-09-25 PROCEDURE — 84100 ASSAY OF PHOSPHORUS: CPT

## 2024-09-25 PROCEDURE — 80053 COMPREHEN METABOLIC PANEL: CPT

## 2024-09-25 PROCEDURE — 250N000013 HC RX MED GY IP 250 OP 250 PS 637: Performed by: SURGERY

## 2024-09-25 PROCEDURE — 93750 INTERROGATION VAD IN PERSON: CPT | Performed by: NURSE PRACTITIONER

## 2024-09-25 PROCEDURE — 120N000005 HC R&B MS OVERFLOW UMMC

## 2024-09-25 PROCEDURE — 97530 THERAPEUTIC ACTIVITIES: CPT | Mod: GP | Performed by: REHABILITATION PRACTITIONER

## 2024-09-25 PROCEDURE — 71045 X-RAY EXAM CHEST 1 VIEW: CPT

## 2024-09-25 PROCEDURE — 85027 COMPLETE CBC AUTOMATED: CPT

## 2024-09-25 PROCEDURE — 250N000013 HC RX MED GY IP 250 OP 250 PS 637

## 2024-09-25 PROCEDURE — 99231 SBSQ HOSP IP/OBS SF/LOW 25: CPT | Mod: 24 | Performed by: SURGERY

## 2024-09-25 PROCEDURE — 250N000011 HC RX IP 250 OP 636: Performed by: STUDENT IN AN ORGANIZED HEALTH CARE EDUCATION/TRAINING PROGRAM

## 2024-09-25 RX ORDER — ACETAMINOPHEN 325 MG/1
975 TABLET ORAL EVERY 8 HOURS
Status: DISCONTINUED | OUTPATIENT
Start: 2024-09-25 | End: 2024-10-05 | Stop reason: HOSPADM

## 2024-09-25 RX ORDER — MAGNESIUM SULFATE HEPTAHYDRATE 40 MG/ML
2 INJECTION, SOLUTION INTRAVENOUS ONCE
Status: COMPLETED | OUTPATIENT
Start: 2024-09-25 | End: 2024-09-25

## 2024-09-25 RX ADMIN — HEPARIN, PORCINE (PF) 10 UNIT/ML INTRAVENOUS SYRINGE 5 ML: at 16:12

## 2024-09-25 RX ADMIN — MAGNESIUM SULFATE HEPTAHYDRATE 2 G: 2 INJECTION, SOLUTION INTRAVENOUS at 07:41

## 2024-09-25 RX ADMIN — METHOCARBAMOL TABLETS 750 MG: 750 TABLET, COATED ORAL at 22:15

## 2024-09-25 RX ADMIN — AMIODARONE HYDROCHLORIDE 200 MG: 200 TABLET ORAL at 07:45

## 2024-09-25 RX ADMIN — ACETAMINOPHEN 975 MG: 325 TABLET ORAL at 23:42

## 2024-09-25 RX ADMIN — CHLORHEXIDINE GLUCONATE 0.12% ORAL RINSE 15 ML: 1.2 LIQUID ORAL at 07:45

## 2024-09-25 RX ADMIN — PANTOPRAZOLE SODIUM 40 MG: 40 TABLET, DELAYED RELEASE ORAL at 07:45

## 2024-09-25 RX ADMIN — Medication 5 MG: at 21:25

## 2024-09-25 RX ADMIN — THERA TABS 1 TABLET: TAB at 07:45

## 2024-09-25 RX ADMIN — ACETAMINOPHEN 650 MG: 325 TABLET ORAL at 07:59

## 2024-09-25 RX ADMIN — ATORVASTATIN CALCIUM 80 MG: 80 TABLET, FILM COATED ORAL at 21:25

## 2024-09-25 RX ADMIN — GABAPENTIN 100 MG: 100 CAPSULE ORAL at 21:25

## 2024-09-25 RX ADMIN — ACETAMINOPHEN 975 MG: 325 TABLET ORAL at 16:11

## 2024-09-25 RX ADMIN — WARFARIN SODIUM 0.5 MG: 1 TABLET ORAL at 18:09

## 2024-09-25 RX ADMIN — Medication 5 ML: at 05:20

## 2024-09-25 RX ADMIN — ESCITALOPRAM OXALATE 10 MG: 10 TABLET ORAL at 07:45

## 2024-09-25 ASSESSMENT — ACTIVITIES OF DAILY LIVING (ADL)
ADLS_ACUITY_SCORE: 36
ADLS_ACUITY_SCORE: 35
ADLS_ACUITY_SCORE: 36
ADLS_ACUITY_SCORE: 35
ADLS_ACUITY_SCORE: 36
ADLS_ACUITY_SCORE: 35
ADLS_ACUITY_SCORE: 36
ADLS_ACUITY_SCORE: 35
ADLS_ACUITY_SCORE: 36
ADLS_ACUITY_SCORE: 35

## 2024-09-25 NOTE — PROGRESS NOTES
[id number]  Neuro: A&Ox4. Able to make needs known properly.   Cardiac: A fib. VSS. LVAD numbers WDL.   Respiratory: Sating >92% on RA. Denies SOB.   GI/: Adequate urine output. 1 BM overnight.   Diet/appetite: Tolerating mechanical soft diet.   Activity:  Assist of 2 in bed. Pt declined to get up for standing wt this morning.    Pain: At acceptable level on current regimen.   Skin: No new deficits noted.  LDA's: Double lumen PICC to right arm, PIV to right arm infusing heparin at 1100 unit(s)/h.   Plan: Continue with POC. Notify primary team with changes.

## 2024-09-25 NOTE — PLAN OF CARE
Goal Outcome Evaluation:      Plan of Care Reviewed With: patient    Overall Patient Progress: improvingOverall Patient Progress: improving  Duane C Johnson is a 74 year old male with a PMH of HFrEF 2/2 ICM with LVEF 20-30%, atrial fibrillation, 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 Due to worsening functional limitations due to heart failure, patient was worked up for VAD while inpatient. Patient is now s/p implantation of HeartMate 3 LVAD on 9/18/2024 with Dr. Mulvihill. Returned to OR on POD 0 for mediastinal re-exploration, mediastinal clot evacuation, and chest washout.   Lvad dressing changed this afternoon, see flowsheets for that information.  Patient up with gait belt, walker and 1 assist to bathroom and bedside recliner.  See flowsheets for LVAD numbers.

## 2024-09-25 NOTE — PROGRESS NOTES
Transfer  Transferred from: 4A  Via:bed  Reason for transfer:Pt appropriate for 6C- improved patient condition  Family: Aware of transfer  Belongings: Sent with pt  Chart: Sent with pt  Medications: Meds from bin sent with pt

## 2024-09-25 NOTE — PROCEDURES
The patient's HeartMate LVAD was interrogated 9/25/2024  * Speed 5200 rpm   * Pulsatility index 5.2  * Power 3.4 Dahl   * Flow 3 L/minute   Fluid status: euvolemic to slightly hypovolemic   Alarms were reviewed, and notable for frequent PI events, no power spikes, speed drops, or other findings suspicious of pump malfunction noted.   The driveline exit site was inspected, c/d/i.   All external components were inspected and showed no evidence of damage or malfunction, none replaced.   No changes to VAD settings made

## 2024-09-25 NOTE — PROGRESS NOTES
Cardiovascular Surgery Progress Note  09/25/2024         Assessment and Plan:     Duane C Johnson is a 74 year old male with a PMH of  HFrEF 2/2 ICM with LVEF 20-30%, atrial fibrillation, 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 Due to worsening functional limitations due to heart failure, patient was worked up for VAD while inpatient. Patient is now s/p implantation of HeartMate 3 LVAD on 9/18/2024 with Dr. Mulvihill. Returned to OR on POD 0 for mediastinal re-exploration, mediastinal clot evacuation, and chest washout.     Cardiovascular:   HFrEF 2/2 ICM - s/p HM3 LVAD implant 9/18/24  Cardiogenic shock, improved  Hx afib s/p DCCV (9/5/24)  Hx VT/VF arrest with ICD in place (5/8/24)  Hx STEMI, CAD s/p PCI to LAD (2023)  HTN  HLD  Preop echo showed LV EF 10-15%  - ASA not indicated  - Atorvastatin 80 mg daily  - PTA amiodarone 200 mg daily  - Cards 2 following for heart failure optimization, appreciate assistance  - Antihypertensive management per Cards 2, will defer starting lisinopril for now    Chest tubes: mediastinal tubes removed 9/24, continue pleural chest tube to suction pending lower output    Pulmonary:  - Extubated POD 2; now saturating well on RA   - Supplemental O2 PRN to keep sats > 92%  - Pulm toilet, IS, OOB/activity and deep breathing    Neurology / MSK:  Acute post-operative pain  - Multimodal analgesia with acetaminophen, oxycodone PRN, Robaxin PRN, HS gabapentin    Anxiety  - PTA Lexapro resumed    S/p sternotomy   - PT/OT/CR consults   - Sternal precautions     / Renal / FE:  Hyponatremia  Hx prostate cancer s/p Leuprolide/radiation therapy   - Baseline creatinine 0.9-1  - Diuresis: per Cardiology  - Replace electrolytes prn per protocol  - Strict I/O, daily standing weights per protocol  - Completed Leuprolide on 6/11/24 and radiation therapy on 6/27/24. Last PSA was <0.01 on 7/29/24  "    GI / Nutrition:   - Soft diet, continue bowel regimen    Endocrine:  Stress-induced hyperglycemia, resolved  Hgb A1c 5.4%    - Initially managed on insulin drip postop, transitioned to sliding scale; will discontinue POCT and sliding scale given maintenance of euglycemia without need for supplemental insulin    Infectious Disease:  Stress-induced leukocytosis, resolved  - WBC WNL, afebrile, no signs or symptoms of infection  - Completed perioperative antibiotics    Hematology:   Acute blood loss anemia  Thrombocytopenia, resolved  Warfarin AC for LVAD  Hgb stable; Plt WNL, no signs or symptoms of active bleeding    Anticoagulation:   - Coumadin for LVAD, INR goal 2-3. Most recent INR therapeutic.    - LIH discontinued this morning    Prophylaxis:   - Stress ulcer prophylaxis: Pantoprazole 40 mg daily for 30 days post-operatively  - DVT prophylaxis: warfarin AC, SCD, OOB/activity    Disposition:   - Transferred to  on 9/23  - Therapies recommending discharge to ARU vs TCU  - Medically Ready for Discharge: Anticipated in 2-4 Days      Dr. Mulvihill updated.    Kath Salaazr, CNP, [facility]-  Cardiothoracic Surgery  [id number] Pager [phone]        Interval History:     Transferred out of CVICU overnight.  States pain is generally well controlled but does not some left anterolateral rib pain.   Tolerating diet with good appetite, + BM. No nausea or vomiting.  Denies dyspnea but does endorse fatigue with activity.  Denies chest pain, dizziness/lightheadedness, and sternal popping/clicking/snapping.         Physical Exam:   Blood pressure (!) 88/71, pulse 102, temperature 98.3  F (36.8  C), temperature source Oral, resp. rate 18, height 1.676 m (5' 5.98\"), weight 73.8 kg (162 lb 11.2 oz), SpO2 96%.  Vitals:    09/22/24 0400 09/23/24 0400 09/25/24 0400   Weight: 72.6 kg (160 lb 0.9 oz) 71.9 kg (158 lb 8.2 oz) 73.8 kg (162 lb 11.2 oz)      Admission weight: 66 kg  24 hr Fluid status:  +254 mL    Gen: NAD, sitting " "up in chair having tray, pleasant, calm, conversant, cooperative on exam  Neuro: A&Ox4, no focal deficits, face symmetric, speech clear, TORO  CV: RRR on monitor, WWP, mild LE edema with compression stockings in place  Pulm: unlabored breathing on RA  Abd: nondistended  Ext: mild peripheral edema, no gross deformities  Incision: clean, dry, intact, no erythema or induration, sternum stable  Tubes/drain sites: dressing clean and dry, serous output, no air leak  Lines: driveline dressing clean and dry     Heartmate 3 LEFT VS  Flow (Lpm): 3.2 Lpm  Pulse Index (PI): 6.2 PI  Speed (rpm): 5200 rpm  Power (jackson): 3.4 jackson  Current Hct settin    Clinically Significant Risk Factors         # Hyponatremia: Lowest Na = 130 mmol/L in last 2 days, will monitor as appropriate      # Hypoalbuminemia: Lowest albumin = 2.2 g/dL at 2024  3:18 AM, will monitor as appropriate      # End stage heart failure: Ventricular assist device (VAD) present          # Overweight: Estimated body mass index is 26.27 kg/m  as calculated from the following:    Height as of this encounter: 1.676 m (5' 5.98\").    Weight as of this encounter: 73.8 kg (162 lb 11.2 oz).   # Moderate Malnutrition: based on nutrition assessment    # Financial/Environmental Concerns: none   # ICD device present            Data:    Imaging:  reviewed recent imaging    Recent Results (from the past 24 hour(s))   XR Chest Port 1 View    Narrative    Exam: XR CHEST PORT 1 VIEW  , 2024 6:15 AM    Indication: Chest tubes    Comparison: 2024    Findings:   Single view of the chest. Postsurgical changes in the chest with  intact median sternotomy wires. LVAD. Left chest wall implantable  cardiac defibrillator with leads projecting over the right atrium and  right ventricle. Right upper extremity PICC tip terminates at the  superior cavoatrial junction. Left-sided chest tube. Mediastinal  drain. Trachea is midline. Cardiac silhouette is stable. Streaky  perihilar " and bibasilar opacities. No pleural effusion or  pneumothorax.      Impression    Impression:   1. Similar streaky perihilar and bibasilar opacities, likely  atelectasis.  2. Postoperative changes in the chest with support devices as  described.    I have personally reviewed the examination and initial interpretation  and I agree with the findings.    PANCHITO EID MD         SYSTEM ID:  [id number]       Labs:  BMP  Recent Labs   Lab 09/25/24  0733 09/25/24  0520 09/24/24  2127 09/24/24  1806 09/24/24  0752 09/24/24  0436 09/23/24  1601 09/23/24  0318 09/23/24  0112 09/23/24  0110 09/22/24  0813 09/22/24  0344   NA  --  130*  --   --   --  131*  --  133*  --   --   --  133*   POTASSIUM  --  4.2  --   --   --  4.3  --  4.1  --  4.2   < > 3.9   CHLORIDE  --  101  --   --   --  98  --  100  --   --   --  99   PRANAV  --  7.8*  --   --   --  7.8*  --  7.5*  --   --   --  7.7*   CO2  --  26  --   --   --  27  --  26  --   --   --  26   BUN  --  27.5*  --   --   --  26.8*  --  30.1*  --   --   --  27.3*   CR  --  0.64*  --   --   --  0.65*  --  0.63*  --   --   --  0.61*   GLC 93 90 109* 97   < > 88   < > 98   < >  --    < > 127*    < > = values in this interval not displayed.     CBC  Recent Labs   Lab 09/25/24  0520 09/24/24  0436 09/23/24  0041 09/22/24  0344   WBC 6.7 8.6 10.5 9.5   RBC 2.56* 2.53* 2.59* 2.62*   HGB 7.9* 7.8* 8.0* 7.9*   HCT 24.5* 24.0* 24.4* 24.2*   MCV 96 95 94 92   MCH 30.9 30.8 30.9 30.2   MCHC 32.2 32.5 32.8 32.6   RDW 16.5* 16.8* 17.0* 17.5*    164 118* 115*     INR  Recent Labs   Lab 09/25/24  0902 09/25/24  0520 09/24/24  0436 09/23/24  0318   INR 2.93* 2.91* 1.75* 1.29*      Hepatic Panel  Recent Labs   Lab 09/25/24  0520 09/24/24  0436 09/23/24  0318 09/22/24  0344   AST 23 24 23 28   ALT 15 17 16 19   ALKPHOS 68 69 71 79   BILITOTAL 0.4 0.4 0.4 0.6   ALBUMIN 2.3* 2.3* 2.2* 2.3*     GLUCOSE:   Recent Labs   Lab 09/25/24  0733 09/25/24  0520 09/24/24  2127 09/24/24  1806 09/24/24  1131  09/24/24  0752   [facility] 93 90 109* 97 95 93

## 2024-09-25 NOTE — PROGRESS NOTES
[facility]   Cardiology II Service / Advanced Heart Failure  Daily Consult Note      Patient: Duane C Johnson  MRN: [id number]  Admission Date: 8/30/2024  Hospital Day # 26    Assessment and Plan: Duane C Johnson is a 74 year old male pmhx of anterior STEMI s/p PCI to the pLAD on 10/26/23 with a VT/VF arrest the next day (10/27) with patent stents and unchanged anatomy on repeat angiogram. Placed on amiodarone and discharged 11/03/23, ICD was not indicated as this was within 48h of his MI. His EF prior to discharge was 20-30% with a large area of akinesis in the LAD, placed on apixaban due to this (Apixaban dcd on 7/5/24). Has had multiple admissions for HF exacerbation last year.  Admitted on 8/30/24 for CHF exacerbation with BNP 16k. CPX and RHC showed significant functional limitations due to heart failue. Underwent HM3 LVAD on 9/18/24.       Recommendations:  - discontinue heparin gtt in setting of now subtherapeutic INR  - compression stockings per nursing   - scheduled tylenol   - LVAD teaching to start tomorrow   - daily standing weights   - encourage PO intake 2/2 to PI drops and borderline low flows    - consider starting lisinopril 2.5 mg daily if MAPs > 85 for more than 50% of the day      # Acute on chronic systolic heart failure secondary to ICM   # s/p HM3 LVAD as DT on 9/18/2024  # CAD s/p PCI  # History of STEMI  # s/p ICD 5/2024  Stage D. NYHA Class III.    Fluid status: euvolemic  ACEi/ARB/ARNi: MAPs presently at goal, would start lisinopril 2.5 mg if MAPs > 85 for 50% of the time   Afterload reduction: defer   BB recent LVAD  Aldosterone antagonist deferred while other medical therapy is prioritized  SCD prophylaxis ICD  MAP: 65-85, at goal  LDH trends: 254, stable  Anticoagulation: warfarin dosing per pharmacy, INR goal 2-3, today 2.93  INR had rapid increase in 24h from 1.75 to 2.93, heparin gtt stopped, request reduced dose by pharmacy tonight   Antiplatelet: ASA not indicated in  "LVAD population, > 6 months s/p STEMI    # History of VT/VF arrest 2023  # AFib s/p DCCV 9/2024  - continue amiodarone 200 mg daily   - A/C as above     # Hyponatremia hypovolemia   Na 130, ranging 128-133  - encourage PO intake  - hold diuresis at this time  - daily BMP      Time/Communication  I spent 40 minutes reviewing results, care team notes, meeting directly with the patient, coordinating care, and completing documentation on the day of service.     Patient discussed with Dr. Laurent.      Sirisha Arias, CHICO, FNP-C  Advanced Heart Failure/Cardiology II Service  [employer]oc[employer] Preferred or Pager [phone]  ================================================================    Subjective/24-Hr Events:   Last 24 hr care team notes reviewed. No overnight events. Transferred from ICU. Up in chair this morning. Pain relatively well controlled. Breathing feels tight, but sating well on RA. A little lightheaded with standing. PI drops down to low 2s noted by nursing. Noted bilateral peripheral edema. No fevers, chills, bleeding concerns. No HA, vision changes, stroke symptoms. + BM. Teeth pulled, eating soft diet.     ROS:  4 point ROS including respiratory, CV, GI and  (other than that noted in the HPI) is negative.     Medications: Reviewed in EPIC.     Physical Exam:   BP (!) 88/71 (BP Location: Left arm)   Pulse 102   Temp 98.3  F (36.8  C) (Oral)   Resp 18   Ht 1.676 m (5' 5.98\")   Wt 73.8 kg (162 lb 11.2 oz)   SpO2 96%   BMI 26.27 kg/m      GENERAL: Appears comfortable, in no distress.  HEENT: Eye symmetrical, no discharge or icterus bilaterally. Mucous membranes moist and without lesions.  NECK: Supple, JVD < 6 cm at 90 degrees.   CV: + LVAD hum  RESPIRATORY: Respirations regular, even, and unlabored. Lungs CTA throughout.    GI: Soft and non distended with normoactive bowel sounds present in all quadrants. No tenderness, rebound, guarding.   EXTREMITIES: Trace BLE peripheral edema. 2+ bilateral pedal pulses. "   NEUROLOGIC: Alert and oriented x 3. No focal deficits.   MUSCULOSKELETAL: No joint swelling or tenderness.   SKIN: No jaundice. No rashes or lesions.     Labs:  CMP  Recent Labs   Lab 09/25/24  0733 09/25/24  0520 09/24/24  2127 09/24/24  1806 09/24/24  0752 09/24/24  0436 09/23/24  1601 09/23/24  0318 09/23/24  0112 09/23/24  0110 09/22/24  0813 09/22/24  0344   NA  --  130*  --   --   --  131*  --  133*  --   --   --  133*   POTASSIUM  --  4.2  --   --   --  4.3  --  4.1  --  4.2   < > 3.9   CHLORIDE  --  101  --   --   --  98  --  100  --   --   --  99   CO2  --  26  --   --   --  27  --  26  --   --   --  26   ANIONGAP  --  3*  --   --   --  6*  --  7  --   --   --  8   GLC 93 90 109* 97   < > 88   < > 98   < >  --    < > 127*   BUN  --  27.5*  --   --   --  26.8*  --  30.1*  --   --   --  27.3*   CR  --  0.64*  --   --   --  0.65*  --  0.63*  --   --   --  0.61*   GFRESTIMATED  --  >90  --   --   --  >90  --  >90  --   --   --  >90   PRANAV  --  7.8*  --   --   --  7.8*  --  7.5*  --   --   --  7.7*   MAG  --  2.0  --   --   --  1.9  --  2.2  --   --   --  2.0   PHOS  --  3.1  --   --   --  2.9  --  2.6  --   --   --  2.5   PROTTOTAL  --  5.3*  --   --   --  5.1*  --  4.8*  --   --   --  5.0*   ALBUMIN  --  2.3*  --   --   --  2.3*  --  2.2*  --   --   --  2.3*   BILITOTAL  --  0.4  --   --   --  0.4  --  0.4  --   --   --  0.6   ALKPHOS  --  68  --   --   --  69  --  71  --   --   --  79   AST  --  23  --   --   --  24  --  23  --   --   --  28   ALT  --  15  --   --   --  17  --  16  --   --   --  19    < > = values in this interval not displayed.       CBC  Recent Labs   Lab 09/25/24  0520 09/24/24  0436 09/23/24  0041 09/22/24  0344   WBC 6.7 8.6 10.5 9.5   RBC 2.56* 2.53* 2.59* 2.62*   HGB 7.9* 7.8* 8.0* 7.9*   HCT 24.5* 24.0* 24.4* 24.2*   MCV 96 95 94 92   MCH 30.9 30.8 30.9 30.2   MCHC 32.2 32.5 32.8 32.6   RDW 16.5* 16.8* 17.0* 17.5*    164 118* 115*       INR  Recent Labs   Lab 09/25/24  0520  09/24/24  0436 09/23/24  0318 09/22/24  0344   INR 2.91* 1.75* 1.29* 1.19*

## 2024-09-26 ENCOUNTER — APPOINTMENT (OUTPATIENT)
Dept: GENERAL RADIOLOGY | Facility: CLINIC | Age: 74
DRG: 001 | End: 2024-09-26
Attending: INTERNAL MEDICINE
Payer: MEDICARE

## 2024-09-26 ENCOUNTER — APPOINTMENT (OUTPATIENT)
Dept: PHYSICAL THERAPY | Facility: CLINIC | Age: 74
DRG: 001 | End: 2024-09-26
Attending: INTERNAL MEDICINE
Payer: MEDICARE

## 2024-09-26 ENCOUNTER — APPOINTMENT (OUTPATIENT)
Dept: OCCUPATIONAL THERAPY | Facility: CLINIC | Age: 74
DRG: 001 | End: 2024-09-26
Attending: INTERNAL MEDICINE
Payer: MEDICARE

## 2024-09-26 LAB
ALBUMIN SERPL BCG-MCNC: 2.4 G/DL (ref 3.5–5.2)
ALP SERPL-CCNC: 79 U/L (ref 40–150)
ALT SERPL W P-5'-P-CCNC: 29 U/L (ref 0–70)
ANION GAP SERPL CALCULATED.3IONS-SCNC: 7 MMOL/L (ref 7–15)
AST SERPL W P-5'-P-CCNC: 34 U/L (ref 0–45)
BILIRUB SERPL-MCNC: 0.3 MG/DL
BUN SERPL-MCNC: 26.6 MG/DL (ref 8–23)
CALCIUM SERPL-MCNC: 7.6 MG/DL (ref 8.8–10.4)
CHLORIDE SERPL-SCNC: 98 MMOL/L (ref 98–107)
CREAT SERPL-MCNC: 0.63 MG/DL (ref 0.67–1.17)
EGFRCR SERPLBLD CKD-EPI 2021: >90 ML/MIN/1.73M2
ERYTHROCYTE [DISTWIDTH] IN BLOOD BY AUTOMATED COUNT: 16.5 % (ref 10–15)
GLUCOSE SERPL-MCNC: 83 MG/DL (ref 70–99)
HCO3 SERPL-SCNC: 24 MMOL/L (ref 22–29)
HCT VFR BLD AUTO: 24 % (ref 40–53)
HGB BLD-MCNC: 7.6 G/DL (ref 13.3–17.7)
INR PPP: 2.97 (ref 0.85–1.15)
LDH SERPL L TO P-CCNC: 282 U/L (ref 0–250)
MAGNESIUM SERPL-MCNC: 2 MG/DL (ref 1.7–2.3)
MCH RBC QN AUTO: 30.3 PG (ref 26.5–33)
MCHC RBC AUTO-ENTMCNC: 31.7 G/DL (ref 31.5–36.5)
MCV RBC AUTO: 96 FL (ref 78–100)
PHOSPHATE SERPL-MCNC: 2.9 MG/DL (ref 2.5–4.5)
PLATELET # BLD AUTO: 218 10E3/UL (ref 150–450)
POTASSIUM SERPL-SCNC: 4.4 MMOL/L (ref 3.4–5.3)
PROT SERPL-MCNC: 5.3 G/DL (ref 6.4–8.3)
RBC # BLD AUTO: 2.51 10E6/UL (ref 4.4–5.9)
SODIUM SERPL-SCNC: 129 MMOL/L (ref 135–145)
WBC # BLD AUTO: 8.5 10E3/UL (ref 4–11)

## 2024-09-26 PROCEDURE — 83735 ASSAY OF MAGNESIUM: CPT

## 2024-09-26 PROCEDURE — 99232 SBSQ HOSP IP/OBS MODERATE 35: CPT | Mod: 25 | Performed by: NURSE PRACTITIONER

## 2024-09-26 PROCEDURE — 250N000011 HC RX IP 250 OP 636: Performed by: NURSE PRACTITIONER

## 2024-09-26 PROCEDURE — 71045 X-RAY EXAM CHEST 1 VIEW: CPT | Mod: 26 | Performed by: STUDENT IN AN ORGANIZED HEALTH CARE EDUCATION/TRAINING PROGRAM

## 2024-09-26 PROCEDURE — 97110 THERAPEUTIC EXERCISES: CPT | Mod: GP | Performed by: REHABILITATION PRACTITIONER

## 2024-09-26 PROCEDURE — 250N000013 HC RX MED GY IP 250 OP 250 PS 637: Performed by: INTERNAL MEDICINE

## 2024-09-26 PROCEDURE — 120N000005 HC R&B MS OVERFLOW UMMC

## 2024-09-26 PROCEDURE — 250N000013 HC RX MED GY IP 250 OP 250 PS 637: Performed by: NURSE PRACTITIONER

## 2024-09-26 PROCEDURE — 85610 PROTHROMBIN TIME: CPT

## 2024-09-26 PROCEDURE — 82040 ASSAY OF SERUM ALBUMIN: CPT

## 2024-09-26 PROCEDURE — 84100 ASSAY OF PHOSPHORUS: CPT

## 2024-09-26 PROCEDURE — 97530 THERAPEUTIC ACTIVITIES: CPT | Mod: GP | Performed by: REHABILITATION PRACTITIONER

## 2024-09-26 PROCEDURE — 250N000011 HC RX IP 250 OP 636: Performed by: STUDENT IN AN ORGANIZED HEALTH CARE EDUCATION/TRAINING PROGRAM

## 2024-09-26 PROCEDURE — 97535 SELF CARE MNGMENT TRAINING: CPT | Mod: GO

## 2024-09-26 PROCEDURE — 83615 LACTATE (LD) (LDH) ENZYME: CPT | Performed by: STUDENT IN AN ORGANIZED HEALTH CARE EDUCATION/TRAINING PROGRAM

## 2024-09-26 PROCEDURE — 93750 INTERROGATION VAD IN PERSON: CPT | Performed by: NURSE PRACTITIONER

## 2024-09-26 PROCEDURE — 250N000013 HC RX MED GY IP 250 OP 250 PS 637

## 2024-09-26 PROCEDURE — 71045 X-RAY EXAM CHEST 1 VIEW: CPT

## 2024-09-26 PROCEDURE — 85027 COMPLETE CBC AUTOMATED: CPT

## 2024-09-26 RX ORDER — METHOCARBAMOL 750 MG/1
750 TABLET, FILM COATED ORAL 3 TIMES DAILY
Status: DISCONTINUED | OUTPATIENT
Start: 2024-09-26 | End: 2024-10-05 | Stop reason: HOSPADM

## 2024-09-26 RX ORDER — WARFARIN SODIUM 2.5 MG/1
2.5 TABLET ORAL
Status: COMPLETED | OUTPATIENT
Start: 2024-09-26 | End: 2024-09-26

## 2024-09-26 RX ORDER — MAGNESIUM OXIDE 400 MG/1
400 TABLET ORAL EVERY 4 HOURS
Status: COMPLETED | OUTPATIENT
Start: 2024-09-26 | End: 2024-09-26

## 2024-09-26 RX ORDER — FUROSEMIDE 10 MG/ML
20 INJECTION INTRAMUSCULAR; INTRAVENOUS ONCE
Status: COMPLETED | OUTPATIENT
Start: 2024-09-26 | End: 2024-09-26

## 2024-09-26 RX ORDER — LISINOPRIL 2.5 MG/1
2.5 TABLET ORAL DAILY
Status: DISCONTINUED | OUTPATIENT
Start: 2024-09-26 | End: 2024-09-29

## 2024-09-26 RX ADMIN — ESCITALOPRAM OXALATE 10 MG: 10 TABLET ORAL at 08:31

## 2024-09-26 RX ADMIN — LISINOPRIL 2.5 MG: 2.5 TABLET ORAL at 08:31

## 2024-09-26 RX ADMIN — ACETAMINOPHEN 975 MG: 325 TABLET ORAL at 08:31

## 2024-09-26 RX ADMIN — Medication 5 MG: at 19:55

## 2024-09-26 RX ADMIN — MAGNESIUM OXIDE TAB 400 MG (241.3 MG ELEMENTAL MG) 400 MG: 400 (241.3 MG) TAB at 08:31

## 2024-09-26 RX ADMIN — THERA TABS 1 TABLET: TAB at 08:31

## 2024-09-26 RX ADMIN — ACETAMINOPHEN 975 MG: 325 TABLET ORAL at 15:12

## 2024-09-26 RX ADMIN — MAGNESIUM OXIDE TAB 400 MG (241.3 MG ELEMENTAL MG) 400 MG: 400 (241.3 MG) TAB at 12:05

## 2024-09-26 RX ADMIN — METHOCARBAMOL TABLETS 750 MG: 750 TABLET, COATED ORAL at 08:06

## 2024-09-26 RX ADMIN — WARFARIN SODIUM 2.5 MG: 2.5 TABLET ORAL at 17:18

## 2024-09-26 RX ADMIN — ATORVASTATIN CALCIUM 80 MG: 80 TABLET, FILM COATED ORAL at 19:55

## 2024-09-26 RX ADMIN — AMIODARONE HYDROCHLORIDE 200 MG: 200 TABLET ORAL at 08:31

## 2024-09-26 RX ADMIN — HEPARIN, PORCINE (PF) 10 UNIT/ML INTRAVENOUS SYRINGE 5 ML: at 17:18

## 2024-09-26 RX ADMIN — ACETAMINOPHEN 975 MG: 325 TABLET ORAL at 23:42

## 2024-09-26 RX ADMIN — METHOCARBAMOL TABLETS 750 MG: 750 TABLET, COATED ORAL at 19:56

## 2024-09-26 RX ADMIN — GABAPENTIN 100 MG: 100 CAPSULE ORAL at 21:30

## 2024-09-26 RX ADMIN — METHOCARBAMOL TABLETS 750 MG: 750 TABLET, COATED ORAL at 15:12

## 2024-09-26 RX ADMIN — PANTOPRAZOLE SODIUM 40 MG: 40 TABLET, DELAYED RELEASE ORAL at 08:31

## 2024-09-26 RX ADMIN — FUROSEMIDE 20 MG: 10 INJECTION, SOLUTION INTRAMUSCULAR; INTRAVENOUS at 08:50

## 2024-09-26 ASSESSMENT — ACTIVITIES OF DAILY LIVING (ADL)
ADLS_ACUITY_SCORE: 34
ADLS_ACUITY_SCORE: 36
ADLS_ACUITY_SCORE: 36
ADLS_ACUITY_SCORE: 34
ADLS_ACUITY_SCORE: 36
ADLS_ACUITY_SCORE: 34
ADLS_ACUITY_SCORE: 36
ADLS_ACUITY_SCORE: 36
ADLS_ACUITY_SCORE: 34
ADLS_ACUITY_SCORE: 36
ADLS_ACUITY_SCORE: 36
ADLS_ACUITY_SCORE: 34
ADLS_ACUITY_SCORE: 36
ADLS_ACUITY_SCORE: 36
ADLS_ACUITY_SCORE: 34
ADLS_ACUITY_SCORE: 36

## 2024-09-26 NOTE — PROGRESS NOTES
Post-VAD Social Work Services Progress Note    Date of Initial Social Work Evaluation: 09/05/24  Collaborated with: Patient    Data: Duane was evaluated for LVAD and is remaining inpatient until he receives his VAD implant. He continues to work with therapies and working towards increasing his nutrition prior to LVAD implant. Duane received his LVAD on 9/18/24. Patient was extubated on 9/20 and transferred to  on 9/24. Patient had his chest tubes removed on 9/24 and continues to work with therapies. PT is recommending ARU when medically ready for discharge, anticipated early next week.    Intervention: VAD SW met with patient for supportive visit. Discussed PT recommendation for ARU. Patient agreeable to discharge to ARU when medically ready. Patient asked questions appropriately and indicated looking forward to rehab to meet other people and to be able to return home. He discussed wanting to be able to bring his foam pad his wife brought him for his bed to ARU to aide in sleeping better. Reminded patient about VAD support group next week to continue to meet other individuals with VAD.    Assessment: Patient was pleasant and interactive throughout discussion. Asked questions appropriately and is agreeable to ARU when medically ready for discharge.  Education provided by SW: Ongoing SW availability, support group, [facility]  Plan:    Discharge Plans in Progress: [facility] [facility]    Barriers to d/c plan: medical readiness / recent VAD implant    Follow up Plan: SW to continue to follow for any potential psychosocial concerns pre and post VAD implant.      Jessi Almendarez, MSW, LGSW, APSW  Heart Transplant/[facility]   Teams/Vocera  Ph. [phone]

## 2024-09-26 NOTE — PLAN OF CARE
Time of care 1900 - 2330    Neuro: AOx4  Cardiac: Afib on monitor with occasional paced beats. Doppler MAP 90. HM3 LVAD without alarms and parameters WNL.   Respiratory: Saturating > 95% on room air. Towards end of shift reported he felt his breathing is shallow, but has been this way since surgery. PRN robaxin given to encourage more comfortable respiration, also encouraged IS. Lung sounds clear/dim.   GI/: Voiding without difficulty. Small BM this shift. Reports feeling bloated. Passing gas. Bowel Sounds+.   Diet/appetite: Mechanical/dental soft.   Activity: Up with assist of 2 and walker/GB.  Pain: Denies any surgical pain this shift. Reports chronic back discomfort relieved by changing positions.   Skin: Dressings over drive line and chest tube sites CDI. Sternotomy incision cleansed and MELISSA.   Lines: PICC line and PIV saline locked.   Drains: Chest tube x2 to -20 suction.    Goal Outcome Evaluation: Progressing.

## 2024-09-26 NOTE — PROGRESS NOTES
[facility]   Cardiology II Service / Advanced Heart Failure  Daily Consult Note      Patient: Duane C Johnson  MRN: [id number]  Admission Date: 8/30/2024  Hospital Day # 27    Assessment and Plan: Duane C Johnson is a 74 year old male pmhx of anterior STEMI s/p PCI to the pLAD on 10/26/23 with a VT/VF arrest the next day (10/27) with patent stents and unchanged anatomy on repeat angiogram. Placed on amiodarone and discharged 11/03/23, ICD was not indicated as this was within 48h of his MI. His EF prior to discharge was 20-30% with a large area of akinesis in the LAD, placed on apixaban due to this (Apixaban dcd on 7/5/24). Has had multiple admissions for HF exacerbation last year.  Admitted on 8/30/24 for CHF exacerbation with BNP 16k. CPX and RHC showed significant functional limitations due to heart failue. Underwent HM3 LVAD on 9/18/24.       Recommendations:  - start lisinopril 2.5 mg daily   - IV lasix 20 mg x 1 dose  - compression stockings per nursing   - scheduled robaxin   - LVAD teaching starts today  - daily standing weights   - encourage PO intake  - encourage pulmonary hygiene/OOB activity during daytime   - re-engage nutrition, add magic cups with meals       # Acute on chronic systolic heart failure secondary to ICM   # s/p HM3 LVAD as DT on 9/18/2024  # CAD s/p PCI  # History of STEMI  # s/p ICD 5/2024  Stage D. NYHA Class III.    Fluid status: grossly euvolemic   ACEi/ARB/ARNi: start lisinopril 2.5 mg    Afterload reduction: defer   BB recent LVAD  Aldosterone antagonist deferred while other medical therapy is prioritized  SCD prophylaxis ICD  MAP: goal 65-85, current MAPs >90  LDH trends: 282, stable  Anticoagulation: warfarin dosing per pharmacy, INR goal 2-3, today 2.97  Antiplatelet: ASA not indicated in LVAD population, > 6 months s/p STEMI    # History of VT/VF arrest 2023  # AFib s/p DCCV 9/2024  - continue amiodarone 200 mg daily   - A/C as above     # Hyponatremia hypovolemia  "  Na 129, ranging 128-133  - encourage PO intake  - hold diuresis at this time  - daily BMP      Time/Communication  I spent 40 minutes reviewing results, care team notes, meeting directly with the patient, coordinating care, and completing documentation on the day of service.     Patient discussed with Dr. Laurent.      Sirisha Arias, DNP, FNP-C  Advanced Heart Failure/Cardiology II Service  [place] Preferred or Pager [phone]  ================================================================    Subjective/24-Hr Events:   Last 24 hr care team notes reviewed. No overnight events. Slept well. Back pain this morning. Feeling a little more SOB at rest-not sure if its 2/2 to pain. A little lightheaded with standing. Noted bilateral peripheral edema. No fevers, chills, bleeding concerns. No HA, vision changes, stroke symptoms. + BM. Teeth pulled, eating soft diet.     ROS:  4 point ROS including respiratory, CV, GI and  (other than that noted in the HPI) is negative.     Medications: Reviewed in EPIC.     Physical Exam:   BP (!) 88/71 (BP Location: Left arm)   Pulse 97   Temp 98.3  F (36.8  C) (Oral)   Resp 16   Ht 1.676 m (5' 5.98\")   Wt 73 kg (161 lb)   SpO2 98%   BMI 26.00 kg/m      GENERAL: Appears comfortable, in no distress.  HEENT: Eye symmetrical, no discharge or icterus bilaterally. Mucous membranes moist and without lesions.  NECK: Supple, JVD < 6 cm at 90 degrees.   CV: + LVAD hum  RESPIRATORY: Respirations regular, even, and unlabored. Lungs CTA throughout.    GI: Soft and non distended with normoactive bowel sounds present in all quadrants. No tenderness, rebound, guarding.   EXTREMITIES: Trace BLE peripheral edema. 2+ bilateral pedal pulses.   NEUROLOGIC: Alert and oriented x 3. No focal deficits.   MUSCULOSKELETAL: No joint swelling or tenderness.   SKIN: No jaundice. No rashes or lesions.     Labs:  CMP  Recent Labs   Lab 09/26/24  0504 09/25/24  1115 09/25/24  0733 09/25/24  0520 09/24/24  0752 " 09/24/24  0436 09/23/24  1601 09/23/24  0318   *  --   --  130*  --  131*  --  133*   POTASSIUM 4.4  --   --  4.2  --  4.3  --  4.1   CHLORIDE 98  --   --  101  --  98  --  100   CO2 24  --   --  26  --  27  --  26   ANIONGAP 7  --   --  3*  --  6*  --  7   GLC 83 115* 93 90   < > 88   < > 98   BUN 26.6*  --   --  27.5*  --  26.8*  --  30.1*   CR 0.63*  --   --  0.64*  --  0.65*  --  0.63*   GFRESTIMATED >90  --   --  >90  --  >90  --  >90   PRANAV 7.6*  --   --  7.8*  --  7.8*  --  7.5*   MAG 2.0  --   --  2.0  --  1.9  --  2.2   PHOS 2.9  --   --  3.1  --  2.9  --  2.6   PROTTOTAL 5.3*  --   --  5.3*  --  5.1*  --  4.8*   ALBUMIN 2.4*  --   --  2.3*  --  2.3*  --  2.2*   BILITOTAL 0.3  --   --  0.4  --  0.4  --  0.4   ALKPHOS 79  --   --  68  --  69  --  71   AST 34  --   --  23  --  24  --  23   ALT 29  --   --  15  --  17  --  16    < > = values in this interval not displayed.       CBC  Recent Labs   Lab 09/26/24  0504 09/25/24  0520 09/24/24  0436 09/23/24  0041   WBC 8.5 6.7 8.6 10.5   RBC 2.51* 2.56* 2.53* 2.59*   HGB 7.6* 7.9* 7.8* 8.0*   HCT 24.0* 24.5* 24.0* 24.4*   MCV 96 96 95 94   MCH 30.3 30.9 30.8 30.9   MCHC 31.7 32.2 32.5 32.8   RDW 16.5* 16.5* 16.8* 17.0*    196 164 118*       INR  Recent Labs   Lab 09/26/24  0504 09/25/24  0902 09/25/24  0520 09/24/24  0436   INR 2.97* 2.93* 2.91* 1.75*

## 2024-09-26 NOTE — PROCEDURES
The patient's HeartMate LVAD was interrogated 9/26/2024  * Speed 5200 rpm   * Pulsatility index 4.8   * Power 3.3 Dahl   * Flow 3.3 L/minute   Fluid status: euvolemic to slightly hypervolemic   Alarms were reviewed, and notable for occasional PI events, no power spikes, speed drops, or other findings suspicious of pump malfunction noted.   The driveline exit site was inspected, c/d/i.   All external components were inspected and showed no evidence of damage or malfunction, none replaced.   No changes to VAD settings made

## 2024-09-26 NOTE — PLAN OF CARE
Goal Outcome Evaluation:      Plan of Care Reviewed With: patient    Overall Patient Progress: no changeOverall Patient Progress: no change         Duane C Johnson is a 74 year old male pmhx of anterior STEMI s/p PCI to the pLAD on 10/26/23 with a VT/VF arrest the next day (10/27) with patent stents and unchanged anatomy on repeat angiogram. Placed on amiodarone and discharged 11/03/23, ICD was not indicated as this was within 48h of his MI. His EF prior to discharge was 20-30% with a large area of akinesis in the LAD, placed on apixaban due to this (Apixaban dcd on 7/5/24). Has had multiple admissions for HF exacerbation last year.  Admitted on 8/30/24 for CHF exacerbation with BNP 16k. CPX and RHC showed significant functional limitations due to heart failue. Underwent HM3 LVAD on 9/18/24.    Above copied and pasted from [facility].  Patient had initial LVAD teaching today at bedside with his wife, by LVAD coordinator  Patient continues to complain of fatigue after any activity. Staff continue to encourage patient to be as active as he is able to be. Patient is being seen by Physical therapy and occupational therapies.

## 2024-09-26 NOTE — PLAN OF CARE
9/25/2024 @ [id number]    Shift Notes:   0200: Sudden increased output from CT compared to previous charting in I&O flowsheet. Overnight provider notified & aware.     Neuro: A&O x 4. Ax2 w/ walker & GB.   Cardiac: A fib, HR 80-90s. HM3, LVAD #s WDL. MAPS 70-90. Denies chest pain.   Respiratory: RA, sats >90s. C/O of continued SOB from post-surgery.   GI/: BM x 1. Mechanical/dental soft diet. Ax1-2 to bathroom & urinal @ bedside.   Skin: LVAD driveline; CDI, WDL. Old & current CT; UTV, CDI. Sternal incision; MELISSA, WDL.   Pain: C/O upper back pain; scheduled pain meds.   Labs: Mg replacement needed today 9/26/2024. K & Phos next AM draws tomorrow 9/27/2024.   LDA's: R PIV, SL. R DL PICC, SL.   2 CT @ -20 suction. Shift total 486 ml.         All other body systems WDL.     Plan: Continue with POC. Notify care team of any changes.     For vital signs and complete assessments, please see documentation flowsheets.      Irene Younger RN   Cardiology     Problem: Adult Inpatient Plan of Care  Goal: Plan of Care Review  Outcome: Progressing

## 2024-09-26 NOTE — PROGRESS NOTES
CLINICAL NUTRITION SERVICES - REASSESSMENT NOTE     Nutrition Prescription    RECOMMENDATIONS FOR MDs/PROVIDERS TO ORDER:  None at this time.    Malnutrition Status:    Moderate malnutrition in the context of acute illness or injury.    Recommendations already ordered by Registered Dietitian (RD):  Ensure Enlive ordered TID, chocolate or strawberry.    Future/Additional Recommendations:  Monitor wt, energy intake, and supplement intake/preferences.      Visit follow-up and consult: Mechanical/soft diet, struggling with calorie intake and restricted diet, s/p LVAD.    EVALUATION OF THE PROGRESS TOWARD GOALS   Diet: Mechanical/Dental Soft   -Snacks/supplements: Ensure Enlive; Between Meals. Strawberry at 10AM and chocolate at 2PM.     Pt is allowed to order PRN supplements.     Pt meals (from 9/22/24 to current) typically consist of tomato or chicken noodle soup w/mashed potatoes. Intake is % since 9/21/24.    Nutrition Support: None at this time. Last support 9/19-22/24.    9/17/24 # Meals Recorded: 1 meal - 100% puree pork w/ gravy, puree broccoli, puree carrots, butter, banana, fruit ice    9/16/24 # Meals Recorded: 3  1: 100% scrambled eggs, 2 greek yogurt, 8 oz whole milk  2: 100% tomato soup  3: 100% potato soup   # Supplements Recorded: 4  1: 100% ensure enlive  2: 100% 3 fair life core power    Net I/O: +678.5 (9/25). +254.5 (9/24), -760 (9/23), -246.7 (9/22), -476 (9/21)    Net I/O since 9/12/24 as of 9/26/24: + 4,772.6    NEW FINDINGS   Pt reports a busy day with wife just leaving after his LVAD education lesson. Wife dropped off a new case of Core Power 42g Protein shakes in chocolate flavor. Pt reports drinking two Ensures/ d or one Core Power/ d. Pt reports a great appetite after surgery but trouble with restricted diet because he is not used to having no teeth. Pt reports his cooking talents at home and has discovered a combination that is working for him at the hospital (mixing mashed potatoes  "with his tomato, chicken noodle, or potato soup). From this the pt gets about two bowls of the mixture. Pt reports eating two bowls for lunch and two bowls for dinner.     Pt reported his usual body wt is around 145 lbs.    Addressed difference between diet pre and post surgery, pt reported the additional sides   \"were pointless\", due to the pureed size. Addressed protein needs post surgery w/pt. Pt agrees he may not be getting enough to eat and agrees to try an additional Ensure/ d (Ensure TID /OR/ one Ensure w/ one Core Power).     Wt history:  Date/Time Weight Weight Method   09/26/24 0233 73 kg (161 lb) Standing scale   09/25/24 0400 73.8 kg (162 lb 11.2 oz) Bed scale   09/23/24 0400 71.9 kg (158 lb 8.2 oz) Bed scale   09/22/24 0400 72.6 kg (160 lb 0.9 oz) Bed scale   09/21/24 0400 73.1 kg (161 lb 2.5 oz) Bed scale   09/20/24 0000 71.1 kg (156 lb 12 oz) Bed scale   09/19/24 0600 69.4 kg (153 lb) --   09/17/24 0600 64.9 kg (143 lb 1.3 oz) Bed scale   09/16/24 0605 64.5 kg (142 lb 3.2 oz) Standing scale   09/16/24 0553 64.9 kg (143 lb 1.3 oz) Standing scale   09/15/24 0500 66 kg (145 lb 6.4 oz) Standing scale   09/14/24 0114 66.5 kg (146 lb 8 oz) Standing scale   09/13/24 0600 65.3 kg (144 lb) Standing scale   09/12/24 0435 63 kg (138 lb 14.4 oz) Standing scale   09/11/24 0517 63.4 kg (139 lb 11.2 oz) Standing scale   09/10/24 0400 62.6 kg (137 lb 14.4 oz) Standing scale   09/09/24 0415 62.9 kg (138 lb 9.6 oz) Standing scale   09/08/24 0400 62 kg (136 lb 11.2 oz) Standing scale   09/06/24 0443 61.7 kg (136 lb 1.6 oz) --   09/05/24 0400 61.9 kg (136 lb 8 oz) Standing scale   09/04/24 0511 61.8 kg (136 lb 3.2 oz) Standing scale   09/03/24 1356 62.1 kg (136 lb 14.5 oz) --   09/03/24 0600 62.1 kg (136 lb 12.8 oz) Standing scale   09/02/24 0533 62.2 kg (137 lb 3.2 oz) Standing scale   09/01/24 1009 61.9 kg (136 lb 6.4 oz) Standing scale   08/30/24 1358 66 kg (145 lb 8 oz) --     +16 lb since 8/30/24, possible " contributing factors (I/O, and weight method)    Labs 24:  Sodium 129 (L)  Creatinine (L)  Urea Nitrogen (H, trending down from 24)    Medications:   Multi-vitamin (THERA-VIT) 1 tablet daily, pantoprazole (PROTONIX) 40 mg/d,     PRN: Bisacodyl (DULCOLAX) suppositroy 10 mg, magnesium hydroxide (MILK OF MAGNESIA) suspension 30 mLpolyethylene glycol (MIRALAX) 17 g packet/ twice daily PRN.    Stoolinx - 14 x/d. Last bowel movement 24. Previously having frequent diarrhea, last noted 24.per flowsheet.    Skin: No significant findings at this time.    MALNUTRITION  % Intake: < 75% for > 7 days (moderate)  % Weight Loss: Unable to assess w/fluid buildup.  Subcutaneous Fat Loss: Facial region:  severe, Upper arm:  severe, and Thoracic/intercostal:  severe  Muscle Loss: Temporal:  severe, Facial & jaw region:  severe, Scapular bone:  severe, Thoracic region (clavicle, acromium bone, deltoid, trapezius, pectoral):  severe, Upper arm (bicep, tricep):  severe, and Dorsal hand:  mild  Fluid Accumulation/Edema: Mild peripheral edema per APRN CNP note (24)  Malnutrition Diagnosis: Moderate malnutrition in the context of acute illness or injury.    Previous Goals   Total avg nutritional intake to meet a minimum of 25 kcal/kg and 1.5 g PRO/kg daily (per dosing wt 62 kg).   Evaluation: Not met    Previous Nutrition Diagnosis  Inadequate oral intake related to intubation as evidenced by NPO status and need for EN to meet nutrition needs at this time.     Evaluation: No longer applicable, nutrition diagnosis changed below    CURRENT NUTRITION DIAGNOSIS  Moderate malnutrition in the context of acute illness or injury related to inadequate oral intake as evidenced by intake <75% EER for >7 days, severe subcutaneous fat loss (facial, temporal region, upper arm, and thoracic/intercostal), severe muscle loss (Facial, temporal, jaw region, scapular bone, upper arm and thoracic region, and mild peripheral  edema.    INTERVENTIONS  Implementation  Medical food supplement therapy  Nutrition education for recommended modifications (increased protein needs for recovery)    Goals  Patient to consume % of nutritionally adequate meal trays TID, or the equivalent with supplements/snacks.    Monitoring/Evaluation  Progress toward goals will be monitored and evaluated per protocol.    Rfaaela Moulton  Dietetic Intern N

## 2024-09-26 NOTE — PROGRESS NOTES
Cardiovascular Surgery Progress Note  09/26/2024         Assessment and Plan:     Duane C Johnson is a 74 year old male with a PMH of  HFrEF 2/2 ICM with LVEF 20-30%, atrial fibrillation, 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 Due to worsening functional limitations due to heart failure, patient was worked up for VAD while inpatient. Patient is now s/p implantation of HeartMate 3 LVAD on 9/18/2024 with Dr. Mulvihill. Returned to OR on POD 0 for mediastinal re-exploration, mediastinal clot evacuation, and chest washout.     Cardiovascular:   HFrEF 2/2 ICM - s/p HM3 LVAD implant 9/18/24  Cardiogenic shock, improved  Hx afib s/p DCCV (9/5/24)  Hx VT/VF arrest with ICD in place (5/8/24)  Hx STEMI, CAD s/p PCI to LAD (2023)  HTN  HLD  Preop echo showed LV EF 10-15%  - ASA not indicated  - Atorvastatin 80 mg daily  - PTA amiodarone 200 mg daily  - Cards 2 following for heart failure optimization, appreciate assistance  - Antihypertensive management per Cards 2, started low dose lisinopril today for MAPs in the 90s    Chest tubes: mediastinal tubes removed 9/24, continue pleural chest tube to suction pending lower output    Pulmonary:  - Extubated POD 2; now saturating well on RA   - Supplemental O2 PRN to keep sats > 92%  - Pulm toilet, IS, OOB/activity and deep breathing    Neurology / MSK:  Acute post-operative pain  - Multimodal analgesia with acetaminophen, oxycodone PRN, Robaxin PRN, HS gabapentin    Anxiety  - PTA Lexapro resumed    S/p sternotomy   - PT/OT/CR consults   - Sternal precautions     / Renal / FE:  Hyponatremia  Hx prostate cancer s/p Leuprolide/radiation therapy   - Baseline creatinine 0.9-1  - Diuresis: per Cardiology, received 20 mg IV lasix today   - Replace electrolytes prn per protocol  - Strict I/O, daily standing weights per protocol  - Completed Leuprolide on 6/11/24 and radiation  "therapy on 6/27/24. Last PSA was <0.01 on 7/29/24   - Daily BMP    GI / Nutrition:   - Soft diet, continue bowel regimen    Endocrine:  Stress-induced hyperglycemia, resolved  Hgb A1c 5.4%    - Initially managed on insulin drip postop, transitioned to sliding scale; will discontinue POCT and sliding scale given maintenance of euglycemia without need for supplemental insulin    Infectious Disease:  Stress-induced leukocytosis, resolved  - WBC WNL, afebrile, no signs or symptoms of infection  - Completed perioperative antibiotics    Hematology:   Acute blood loss anemia  Thrombocytopenia, resolved  Warfarin AC for LVAD  Hgb stable; Plt WNL, no signs or symptoms of active bleeding    Anticoagulation:   - Coumadin for LVAD, INR goal 2-3. Most recent INR therapeutic.    - LIH discontinued this morning    Prophylaxis:   - Stress ulcer prophylaxis: Pantoprazole 40 mg daily for 30 days post-operatively  - DVT prophylaxis: warfarin AC, SCD, OOB/activity    Disposition:   - Transferred to  on 9/23  - Therapies recommending discharge to ARU vs TCU  - Medically Ready for Discharge: Anticipated in 2-4 Days      Dr. Mulvihill updated.    Jamal Prabhakar DNP APRN CNP CCRN   Cardiovascular Surgery   Pager [phone]          Interval History:     No acute events overnight.   Had low PIs yesterday morning, otherwise stable.   States pain is generally well controlled but does not some left anterolateral rib pain.   Tolerating diet with good appetite, + BM. No nausea or vomiting.  Denies dyspnea but does endorse fatigue with activity.  Denies chest pain, dizziness/lightheadedness, and sternal popping/clicking/snapping.         Physical Exam:   Blood pressure (!) 88/71, pulse 97, temperature 98.3  F (36.8  C), temperature source Oral, resp. rate 16, height 1.676 m (5' 5.98\"), weight 73 kg (161 lb), SpO2 98%.  Vitals:    09/23/24 0400 09/25/24 0400 09/26/24 0233   Weight: 71.9 kg (158 lb 8.2 oz) 73.8 kg (162 lb 11.2 oz) 73 kg (161 lb) " "     Admission weight: 66 kg  24 hr Fluid status:  +254 mL    Gen: NAD, laying in bed, pleasant, calm, conversant, cooperative on exam  Neuro: A&Ox4, no focal deficits, face symmetric, speech clear, TORO  CV: RRR on monitor, WWP, mild LE edema with compression stockings in place  Pulm: unlabored breathing on RA  Abd: nondistended  Ext: mild peripheral edema, no gross deformities  Incision: clean, dry, intact, no erythema or induration, sternum stable  Tubes/drain sites: dressing clean and dry, serous output, no air leak  Lines: driveline dressing clean and dry     Heartmate 3 LEFT VS  Flow (Lpm): 3.6 Lpm  Pulse Index (PI): 3.6 PI  Speed (rpm): 5200 rpm  Power (jackson): 3.3 jackson  Current Hct settin    Clinically Significant Risk Factors         # Hyponatremia: Lowest Na = 129 mmol/L in last 2 days, will monitor as appropriate      # Hypoalbuminemia: Lowest albumin = 2.2 g/dL at 2024  3:18 AM, will monitor as appropriate        # End stage heart failure: Ventricular assist device (VAD) present          # Overweight: Estimated body mass index is 26 kg/m  as calculated from the following:    Height as of this encounter: 1.676 m (5' 5.98\").    Weight as of this encounter: 73 kg (161 lb).   # Moderate Malnutrition: based on nutrition assessment      # Financial/Environmental Concerns: none   # ICD device present            Data:    Imaging:  reviewed recent imaging    Recent Results (from the past 24 hour(s))   XR Chest Port 1 View    Narrative    EXAM: XR CHEST PORT 1 VIEW  2024 6:56 AM      HISTORY: chest tubes s/p LVAD insertion    COMPARISON: 2024    FINDINGS:   Single view of the chest. Stable support devices including right upper  extremity PICC, LVAD, left chest tube, left chest wall cardiac device.  Trachea is midline. Cardiac silhouette is stable. Increased hazy  basilar opacities. Small bilateral pleural effusion. No pneumothorax.      Impression    IMPRESSION:   Increased hazy bibasilar " opacities, likely atelectasis and small  pleural effusion. Stable support devices.    I have personally reviewed the examination and initial interpretation  and I agree with the findings.    KHLOE FRANCISCO DO         SYSTEM ID:  [id number]       Labs:  BMP  Recent Labs   Lab 09/26/24  0504 09/25/24  1115 09/25/24  0733 09/25/24  0520 09/24/24  0752 09/24/24  0436 09/23/24  1601 09/23/24  0318   *  --   --  130*  --  131*  --  133*   POTASSIUM 4.4  --   --  4.2  --  4.3  --  4.1   CHLORIDE 98  --   --  101  --  98  --  100   PRANAV 7.6*  --   --  7.8*  --  7.8*  --  7.5*   CO2 24  --   --  26  --  27  --  26   BUN 26.6*  --   --  27.5*  --  26.8*  --  30.1*   CR 0.63*  --   --  0.64*  --  0.65*  --  0.63*   GLC 83 115* 93 90   < > 88   < > 98    < > = values in this interval not displayed.     CBC  Recent Labs   Lab 09/26/24  0504 09/25/24  0520 09/24/24  0436 09/23/24  0041   WBC 8.5 6.7 8.6 10.5   RBC 2.51* 2.56* 2.53* 2.59*   HGB 7.6* 7.9* 7.8* 8.0*   HCT 24.0* 24.5* 24.0* 24.4*   MCV 96 96 95 94   MCH 30.3 30.9 30.8 30.9   MCHC 31.7 32.2 32.5 32.8   RDW 16.5* 16.5* 16.8* 17.0*    196 164 118*     INR  Recent Labs   Lab 09/26/24  0504 09/25/24  0902 09/25/24  0520 09/24/24  0436   INR 2.97* 2.93* 2.91* 1.75*      Hepatic Panel  Recent Labs   Lab 09/26/24  0504 09/25/24  0520 09/24/24  0436 09/23/24  0318   AST 34 23 24 23   ALT 29 15 17 16   ALKPHOS 79 68 69 71   BILITOTAL 0.3 0.4 0.4 0.4   ALBUMIN 2.4* 2.3* 2.3* 2.2*     GLUCOSE:   Recent Labs   Lab 09/26/24  0504 09/25/24  1115 09/25/24  0733 09/25/24  0520 09/24/24  2127 09/24/24  1806   GLC 83 115* 93 90 109* 97

## 2024-09-27 ENCOUNTER — APPOINTMENT (OUTPATIENT)
Dept: OCCUPATIONAL THERAPY | Facility: CLINIC | Age: 74
DRG: 001 | End: 2024-09-27
Attending: NURSE PRACTITIONER
Payer: MEDICARE

## 2024-09-27 ENCOUNTER — APPOINTMENT (OUTPATIENT)
Dept: PHYSICAL THERAPY | Facility: CLINIC | Age: 74
DRG: 001 | End: 2024-09-27
Attending: NURSE PRACTITIONER
Payer: MEDICARE

## 2024-09-27 ENCOUNTER — APPOINTMENT (OUTPATIENT)
Dept: GENERAL RADIOLOGY | Facility: CLINIC | Age: 74
DRG: 001 | End: 2024-09-27
Attending: INTERNAL MEDICINE
Payer: MEDICARE

## 2024-09-27 ENCOUNTER — APPOINTMENT (OUTPATIENT)
Dept: CARDIOLOGY | Facility: CLINIC | Age: 74
DRG: 001 | End: 2024-09-27
Attending: NURSE PRACTITIONER
Payer: MEDICARE

## 2024-09-27 LAB
ALBUMIN SERPL BCG-MCNC: 2.4 G/DL (ref 3.5–5.2)
ALP SERPL-CCNC: 76 U/L (ref 40–150)
ALT SERPL W P-5'-P-CCNC: 28 U/L (ref 0–70)
ANION GAP SERPL CALCULATED.3IONS-SCNC: 6 MMOL/L (ref 7–15)
AST SERPL W P-5'-P-CCNC: 34 U/L (ref 0–45)
BILIRUB SERPL-MCNC: 0.3 MG/DL
BUN SERPL-MCNC: 20.1 MG/DL (ref 8–23)
CALCIUM SERPL-MCNC: 7.8 MG/DL (ref 8.8–10.4)
CHLORIDE SERPL-SCNC: 97 MMOL/L (ref 98–107)
CREAT SERPL-MCNC: 0.64 MG/DL (ref 0.67–1.17)
EGFRCR SERPLBLD CKD-EPI 2021: >90 ML/MIN/1.73M2
ERYTHROCYTE [DISTWIDTH] IN BLOOD BY AUTOMATED COUNT: 16.7 % (ref 10–15)
GLUCOSE SERPL-MCNC: 91 MG/DL (ref 70–99)
HCO3 SERPL-SCNC: 25 MMOL/L (ref 22–29)
HCT VFR BLD AUTO: 24.3 % (ref 40–53)
HGB BLD-MCNC: 7.5 G/DL (ref 13.3–17.7)
INR PPP: 2.72 (ref 0.85–1.15)
LDH SERPL L TO P-CCNC: 281 U/L (ref 0–250)
LVEF ECHO: NORMAL
MAGNESIUM SERPL-MCNC: 1.9 MG/DL (ref 1.7–2.3)
MCH RBC QN AUTO: 29.4 PG (ref 26.5–33)
MCHC RBC AUTO-ENTMCNC: 30.9 G/DL (ref 31.5–36.5)
MCV RBC AUTO: 95 FL (ref 78–100)
PHOSPHATE SERPL-MCNC: 3 MG/DL (ref 2.5–4.5)
PLATELET # BLD AUTO: 235 10E3/UL (ref 150–450)
POTASSIUM SERPL-SCNC: 4.3 MMOL/L (ref 3.4–5.3)
PROT SERPL-MCNC: 5.3 G/DL (ref 6.4–8.3)
RBC # BLD AUTO: 2.55 10E6/UL (ref 4.4–5.9)
SODIUM SERPL-SCNC: 128 MMOL/L (ref 135–145)
WBC # BLD AUTO: 8.1 10E3/UL (ref 4–11)

## 2024-09-27 PROCEDURE — 250N000013 HC RX MED GY IP 250 OP 250 PS 637: Performed by: NURSE PRACTITIONER

## 2024-09-27 PROCEDURE — 85610 PROTHROMBIN TIME: CPT

## 2024-09-27 PROCEDURE — 83550 IRON BINDING TEST: CPT | Performed by: INTERNAL MEDICINE

## 2024-09-27 PROCEDURE — 93325 DOPPLER ECHO COLOR FLOW MAPG: CPT | Mod: 26 | Performed by: INTERNAL MEDICINE

## 2024-09-27 PROCEDURE — 71045 X-RAY EXAM CHEST 1 VIEW: CPT | Mod: 26 | Performed by: STUDENT IN AN ORGANIZED HEALTH CARE EDUCATION/TRAINING PROGRAM

## 2024-09-27 PROCEDURE — 250N000013 HC RX MED GY IP 250 OP 250 PS 637: Performed by: INTERNAL MEDICINE

## 2024-09-27 PROCEDURE — 97530 THERAPEUTIC ACTIVITIES: CPT | Mod: GP

## 2024-09-27 PROCEDURE — 82728 ASSAY OF FERRITIN: CPT | Performed by: INTERNAL MEDICINE

## 2024-09-27 PROCEDURE — 80053 COMPREHEN METABOLIC PANEL: CPT

## 2024-09-27 PROCEDURE — 90662 IIV NO PRSV INCREASED AG IM: CPT

## 2024-09-27 PROCEDURE — 250N000011 HC RX IP 250 OP 636

## 2024-09-27 PROCEDURE — 274N000003 HC DEVICE HM 14-V LITHIUM BATTERY, INITIAL ONLY, EACH

## 2024-09-27 PROCEDURE — 83615 LACTATE (LD) (LDH) ENZYME: CPT | Performed by: STUDENT IN AN ORGANIZED HEALTH CARE EDUCATION/TRAINING PROGRAM

## 2024-09-27 PROCEDURE — 85027 COMPLETE CBC AUTOMATED: CPT

## 2024-09-27 PROCEDURE — 83735 ASSAY OF MAGNESIUM: CPT

## 2024-09-27 PROCEDURE — G0008 ADMIN INFLUENZA VIRUS VAC: HCPCS

## 2024-09-27 PROCEDURE — 93750 INTERROGATION VAD IN PERSON: CPT | Performed by: NURSE PRACTITIONER

## 2024-09-27 PROCEDURE — 71045 X-RAY EXAM CHEST 1 VIEW: CPT

## 2024-09-27 PROCEDURE — 99232 SBSQ HOSP IP/OBS MODERATE 35: CPT | Mod: 25 | Performed by: NURSE PRACTITIONER

## 2024-09-27 PROCEDURE — 250N000013 HC RX MED GY IP 250 OP 250 PS 637

## 2024-09-27 PROCEDURE — 250N000011 HC RX IP 250 OP 636: Performed by: STUDENT IN AN ORGANIZED HEALTH CARE EDUCATION/TRAINING PROGRAM

## 2024-09-27 PROCEDURE — 274N000012 HC DEVICE HM POWER UNIT MOBILE W/AC PWR CABLE, INITIAL ONLY, EACH

## 2024-09-27 PROCEDURE — 84100 ASSAY OF PHOSPHORUS: CPT

## 2024-09-27 PROCEDURE — 97535 SELF CARE MNGMENT TRAINING: CPT | Mod: GO

## 2024-09-27 PROCEDURE — 93308 TTE F-UP OR LMTD: CPT | Mod: 26 | Performed by: INTERNAL MEDICINE

## 2024-09-27 PROCEDURE — 250N000013 HC RX MED GY IP 250 OP 250 PS 637: Performed by: SURGERY

## 2024-09-27 PROCEDURE — 93325 DOPPLER ECHO COLOR FLOW MAPG: CPT

## 2024-09-27 PROCEDURE — 120N000005 HC R&B MS OVERFLOW UMMC

## 2024-09-27 PROCEDURE — 93321 DOPPLER ECHO F-UP/LMTD STD: CPT | Mod: 26 | Performed by: INTERNAL MEDICINE

## 2024-09-27 RX ORDER — MAGNESIUM OXIDE 400 MG/1
400 TABLET ORAL EVERY 4 HOURS
Status: COMPLETED | OUTPATIENT
Start: 2024-09-27 | End: 2024-09-27

## 2024-09-27 RX ORDER — WARFARIN SODIUM 3 MG/1
3 TABLET ORAL
Status: COMPLETED | OUTPATIENT
Start: 2024-09-27 | End: 2024-09-27

## 2024-09-27 RX ADMIN — ACETAMINOPHEN 975 MG: 325 TABLET ORAL at 23:38

## 2024-09-27 RX ADMIN — ATORVASTATIN CALCIUM 80 MG: 80 TABLET, FILM COATED ORAL at 20:16

## 2024-09-27 RX ADMIN — MAGNESIUM OXIDE TAB 400 MG (241.3 MG ELEMENTAL MG) 400 MG: 400 (241.3 MG) TAB at 05:46

## 2024-09-27 RX ADMIN — Medication 5 MG: at 20:16

## 2024-09-27 RX ADMIN — ESCITALOPRAM OXALATE 10 MG: 10 TABLET ORAL at 08:58

## 2024-09-27 RX ADMIN — METHOCARBAMOL TABLETS 750 MG: 750 TABLET, COATED ORAL at 08:58

## 2024-09-27 RX ADMIN — HEPARIN, PORCINE (PF) 10 UNIT/ML INTRAVENOUS SYRINGE 5 ML: at 18:20

## 2024-09-27 RX ADMIN — MAGNESIUM OXIDE TAB 400 MG (241.3 MG ELEMENTAL MG) 400 MG: 400 (241.3 MG) TAB at 09:50

## 2024-09-27 RX ADMIN — THERA TABS 1 TABLET: TAB at 08:58

## 2024-09-27 RX ADMIN — Medication 10 ML: at 23:39

## 2024-09-27 RX ADMIN — GABAPENTIN 100 MG: 100 CAPSULE ORAL at 23:38

## 2024-09-27 RX ADMIN — WARFARIN SODIUM 3 MG: 3 TABLET ORAL at 18:19

## 2024-09-27 RX ADMIN — PANTOPRAZOLE SODIUM 40 MG: 40 TABLET, DELAYED RELEASE ORAL at 08:58

## 2024-09-27 RX ADMIN — METHOCARBAMOL TABLETS 750 MG: 750 TABLET, COATED ORAL at 14:39

## 2024-09-27 RX ADMIN — LISINOPRIL 2.5 MG: 2.5 TABLET ORAL at 08:58

## 2024-09-27 RX ADMIN — AMIODARONE HYDROCHLORIDE 200 MG: 200 TABLET ORAL at 08:58

## 2024-09-27 RX ADMIN — METHOCARBAMOL TABLETS 750 MG: 750 TABLET, COATED ORAL at 20:16

## 2024-09-27 RX ADMIN — INFLUENZA A VIRUS A/VICTORIA/4897/2022 IVR-238 (H1N1) ANTIGEN (FORMALDEHYDE INACTIVATED), INFLUENZA A VIRUS A/CALIFORNIA/122/2022 SAN-022 (H3N2) ANTIGEN (FORMALDEHYDE INACTIVATED), AND INFLUENZA B VIRUS B/MICHIGAN/01/2021 ANTIGEN (FORMALDEHYDE INACTIVATED) 0.5 ML: 60; 60; 60 INJECTION, SUSPENSION INTRAMUSCULAR at 12:39

## 2024-09-27 RX ADMIN — ACETAMINOPHEN 650 MG: 325 TABLET ORAL at 16:30

## 2024-09-27 RX ADMIN — ACETAMINOPHEN 975 MG: 325 TABLET ORAL at 06:56

## 2024-09-27 ASSESSMENT — ACTIVITIES OF DAILY LIVING (ADL)
ADLS_ACUITY_SCORE: 34

## 2024-09-27 NOTE — PLAN OF CARE
"Neuro: A&Ox4. Calls appropriately.  Cardiac: HM3 LVAD WNL- MAP 90. Permanent pacemaker DDDR- Afib, V paced- rarely paced. Denies chest pain or SOB.    Respiratory: Sating 96% on RA.  GI/: Voided spontaneously. Last BM 9/26  Diet/appetite: Pt does not have teeth- tolerating mechanical and soft diet. Fair appetite.   Activity:  Assist of 1 w/ walker and gait belt  Pain: 2/10 on his neck pain controlled w/ scheduled tylenol   Skin: AXEL socks on- +1 edema.   LDA's: R PIV- SL; R PICC- both SL. 2xCT w/  0 - 20ml output- dressing CDI.   Replacement: Replaced Mg; recheck K and Phos tmr AM.     Plan: Continue with POC & LVAD teaching. Neuro checks q shift. Notify primary team with changes.       Problem: Adult Inpatient Plan of Care  Goal: Plan of Care Review  Description: The Plan of Care Review/Shift note should be completed every shift.  The Outcome Evaluation is a brief statement about your assessment that the patient is improving, declining, or no change.  This information will be displayed automatically on your shift  note.  Outcome: Progressing  Flowsheets (Taken 9/26/2024 2306)  Plan of Care Reviewed With: patient  Overall Patient Progress: improving  Goal: Patient-Specific Goal (Individualized)  Description: You can add care plan individualizations to a care plan. Examples of Individualization might be:  \"Parent requests to be called daily at 9am for status\", \"I have a hard time hearing out of my right ear\", or \"Do not touch me to wake me up as it startles  me\".  Outcome: Progressing  Goal: Absence of Hospital-Acquired Illness or Injury  Outcome: Progressing  Intervention: Identify and Manage Fall Risk  Recent Flowsheet Documentation  Taken 9/26/2024 1942 by Fariba Johns, RN  Safety Promotion/Fall Prevention: safety round/check completed  Intervention: Prevent Skin Injury  Recent Flowsheet Documentation  Taken 9/26/2024 1942 by Fariba Johns, RN  Body Position: position changed independently  Intervention: Prevent " and Manage VTE (Venous Thromboembolism) Risk  Recent Flowsheet Documentation  Taken 9/26/2024 1942 by Fariba Johns RN  VTE Prevention/Management: compression stockings on  Intervention: Prevent Infection  Recent Flowsheet Documentation  Taken 9/26/2024 1942 by Fariba Johns RN  Infection Prevention:   cohorting utilized   environmental surveillance performed   equipment surfaces disinfected   hand hygiene promoted   personal protective equipment utilized   rest/sleep promoted   single patient room provided  Goal: Optimal Comfort and Wellbeing  Outcome: Progressing  Intervention: Provide Person-Centered Care  Recent Flowsheet Documentation  Taken 9/26/2024 1942 by Fariba Johns RN  Trust Relationship/Rapport:   care explained   choices provided   questions answered  Goal: Readiness for Transition of Care  Outcome: Progressing     Problem: Risk for Delirium  Goal: Optimal Coping  Outcome: Progressing  Goal: Improved Behavioral Control  Outcome: Progressing  Intervention: Minimize Safety Risk  Recent Flowsheet Documentation  Taken 9/26/2024 1942 by Fariba Johns RN  Enhanced Safety Measures:   review medications for side effects with activity   room near unit station   pain management   patient/family teach back on injury risk  Trust Relationship/Rapport:   care explained   choices provided   questions answered  Goal: Improved Attention and Thought Clarity  Outcome: Progressing  Goal: Improved Sleep  Outcome: Progressing     Problem: Heart Failure  Goal: Optimal Coping  Outcome: Progressing  Goal: Optimal Cardiac Output  Outcome: Progressing  Goal: Stable Heart Rate and Rhythm  Outcome: Progressing  Goal: Optimal Functional Ability  Outcome: Progressing  Intervention: Optimize Functional Ability  Recent Flowsheet Documentation  Taken 9/26/2024 1942 by Fariba Johns RN  Activity Management:   activity adjusted per tolerance   activity encouraged  Goal: Fluid and Electrolyte Balance  Outcome: Progressing  Intervention:  Monitor and Manage Fluid and Electrolyte Balance  Recent Flowsheet Documentation  Taken 9/26/2024 1942 by Fariba Johns RN  Fluid/Electrolyte Management: fluids provided  Goal: Improved Oral Intake  Outcome: Progressing  Goal: Effective Oxygenation and Ventilation  Outcome: Progressing  Intervention: Promote Airway Secretion Clearance  Recent Flowsheet Documentation  Taken 9/26/2024 1950 by Fariba Johns RN  Breathing Techniques/Airway Clearance: deep/controlled cough encouraged  Taken 9/26/2024 1942 by Fariba Johns RN  Cough And Deep Breathing: done with encouragement  Activity Management:   activity adjusted per tolerance   activity encouraged  Intervention: Optimize Oxygenation and Ventilation  Recent Flowsheet Documentation  Taken 9/26/2024 1950 by Fariba Johns RN  Airway/Ventilation Management: airway patency maintained  Taken 9/26/2024 1942 by Fariba Johns RN  Head of Bed (HOB) Positioning: HOB at 20 degrees  Goal: Effective Breathing Pattern During Sleep  Outcome: Progressing  Intervention: Monitor and Manage Obstructive Sleep Apnea  Recent Flowsheet Documentation  Taken 9/26/2024 1942 by Fariba Johns RN  Medication Review/Management: medications reviewed     Problem: Cardiovascular Surgery  Goal: Improved Activity Tolerance  Outcome: Progressing  Goal: Optimal Coping with Heart Surgery  Outcome: Progressing  Goal: Absence of Bleeding  Outcome: Progressing  Intervention: Monitor and Manage Bleeding  Recent Flowsheet Documentation  Taken 9/26/2024 1942 by Fariba Johns RN  Bleeding Management: dressing monitored  Goal: Effective Bowel Elimination  Outcome: Progressing  Goal: Effective Cardiac Function  Outcome: Progressing  Goal: Optimal Cerebral Tissue Perfusion  Outcome: Progressing  Intervention: Protect and Optimize Cerebral Perfusion  Recent Flowsheet Documentation  Taken 9/26/2024 1942 by Fariba Johns RN  Head of Bed (HOB) Positioning: HOB at 20 degrees  Goal: Fluid and Electrolyte Balance  Outcome:  Progressing  Intervention: Monitor and Manage Fluid and Electrolyte Balance  Recent Flowsheet Documentation  Taken 9/26/2024 1942 by Fariba Johns RN  Fluid/Electrolyte Management: fluids provided  Goal: Blood Glucose Level Within Targeted Range  Outcome: Progressing  Goal: Absence of Infection Signs and Symptoms  Outcome: Progressing  Intervention: Prevent or Manage Infection  Recent Flowsheet Documentation  Taken 9/26/2024 1942 by Fariba Johns RN  Infection Prevention:   cohorting utilized   environmental surveillance performed   equipment surfaces disinfected   hand hygiene promoted   personal protective equipment utilized   rest/sleep promoted   single patient room provided  Goal: Anesthesia/Sedation Recovery  Outcome: Progressing  Intervention: Optimize Anesthesia Recovery  Recent Flowsheet Documentation  Taken 9/26/2024 1942 by Fariba Johns RN  Safety Promotion/Fall Prevention: safety round/check completed  Goal: Acceptable Pain Control  Outcome: Progressing  Goal: Nausea and Vomiting Relief  Outcome: Progressing  Goal: Effective Urinary Elimination  Outcome: Progressing  Goal: Effective Oxygenation and Ventilation  Outcome: Progressing  Intervention: Promote Airway Secretion Clearance  Recent Flowsheet Documentation  Taken 9/26/2024 1950 by Fariba Johns RN  Airway/Ventilation Management: airway patency maintained  Taken 9/26/2024 1942 by Fariba Johns RN  Cough And Deep Breathing: done with encouragement     Problem: Ventricular Assist Device  Goal: Optimal Adjustment to Device  Outcome: Progressing  Goal: Absence of Bleeding  Outcome: Progressing  Intervention: Monitor and Manage Bleeding  Recent Flowsheet Documentation  Taken 9/26/2024 1942 by Fariba Johns RN  Bleeding Precautions: blood pressure closely monitored  Bleeding Management: dressing monitored  Goal: Absence of Embolism Signs and Symptoms  Outcome: Progressing  Intervention: Prevent or Manage Embolism  Recent Flowsheet Documentation  Taken  9/26/2024 1942 by Fariba Johns, RN  VTE Prevention/Management: compression stockings on  Goal: Optimal Blood Flow  Outcome: Progressing  Goal: Absence of Infection Signs and Symptoms  Outcome: Progressing  Intervention: Prevent or Manage Infection  Recent Flowsheet Documentation  Taken 9/26/2024 1942 by Fariba Johns, RN  Infection Prevention:   cohorting utilized   environmental surveillance performed   equipment surfaces disinfected   hand hygiene promoted   personal protective equipment utilized   rest/sleep promoted   single patient room provided  Driveline Securement: (anchor) other (see comments)  Goal: Effective Right-Sided Heart Function  Outcome: Progressing  Intervention: Manage Decreased Right Heart Function  Recent Flowsheet Documentation  Taken 9/26/2024 1942 by Fariba Johns, RN  Fluid/Electrolyte Management: fluids provided  Medication Review/Management: medications reviewed       Goal Outcome Evaluation:      Plan of Care Reviewed With: patient    Overall Patient Progress: improvingOverall Patient Progress: improving

## 2024-09-27 NOTE — PLAN OF CARE
"Goal Outcome Evaluation:      Plan of Care Reviewed With: patient, spouse    Overall Patient Progress: improvingOverall Patient Progress: improving    Outcome Evaluation: Gaining strength, LVAD teaching today.    I: Monitored vitals and assessed pt status.   Changes during this shift: LVAD teaching with spouse today, new orders patient can go outside with nurse for up to 15 minutes.    Running:   PRN Med:      A:   Neuro: Ax4 Denies Headache, dizziness,  Lightheadedness, numbness and tingling. calls appropriately   Cardiac: HM3, numbers WNL, no alarms this shift. Afib, HR 80s Afebrile, VSS.  Denies chest pain.   Respiratory: sating >95 ON RA. denies SOB. LS clear bilaterally.   Diet/appetite: mechanical/soft Diet. Good appetite.   GI/:  1 BM this shift. Denies abdominal pain. Good urine output.   Activity:  Moves Ax1 with walker and GB  Pain: Acknowledges back pain per baseline, and headached managed with scheduled tylenol. Later in shift patient expressed that he was feeling slight \"rib pain\" lower left side. Writer will follow up with patient and primary team during tomorrow's day shift.  Skin: 2 Cts to suction, LVAD driveline site, Rt PIV SL, R DL PICC heparin locked.  "

## 2024-09-27 NOTE — PROGRESS NOTES
Cardiovascular Surgery Progress Note  09/27/2024         Assessment and Plan:     Duane C Johnson is a 74 year old male with a PMH of  HFrEF 2/2 ICM with LVEF 20-30%, atrial fibrillation, 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 Due to worsening functional limitations due to heart failure, patient was worked up for VAD while inpatient. Patient is now s/p implantation of HeartMate 3 LVAD on 9/18/2024 with Dr. Mulvihill. Returned to OR on POD 0 for mediastinal re-exploration, mediastinal clot evacuation, and chest washout.     Cardiovascular:   HFrEF 2/2 ICM - s/p HM3 LVAD implant 9/18/24  Cardiogenic shock, improved  Hx afib s/p DCCV (9/5/24)  Hx VT/VF arrest with ICD in place (5/8/24)  Hx STEMI, CAD s/p PCI to LAD (2023)  HTN  HLD  Preop echo showed LV EF 10-15%  Postop echo 9/27 showed LVAD cannula seen in the expected anatomic position in the LV apex, normal flow velocities, moderately reduced RV function, normal IVC   - ASA not indicated  - Atorvastatin 80 mg daily  - PTA amiodarone 200 mg daily  - lisinopril 2.5 mg daily started 9/26 for MAPs in the 90s  - Cards 2 following for heart failure optimization, appreciate assistance    Chest tubes: mediastinal tubes removed 9/24, R pleural and pericardial remain. 134 mL from both tubes past 24 hours however 590 out day prior. Will continue to suction today and remove tomorrow if output remains low.    Pulmonary:  - Extubated POD 2; now saturating well on RA   - Supplemental O2 PRN to keep sats > 92%  - Pulm toilet, IS, OOB/activity and deep breathing    Neurology / MSK:  Acute post-operative pain  - Multimodal analgesia with acetaminophen, oxycodone PRN, Robaxin PRN, HS gabapentin    Anxiety  - PTA Lexapro resumed    S/p sternotomy   - PT/OT/CR consults   - Sternal precautions     / Renal / FE:  Hyponatremia  Hx prostate cancer s/p Leuprolide/radiation  therapy   - Baseline creatinine 0.9-1  - Diuresis: per Cardiology, received 20 mg IV lasix 9/26. Further diuresis held today as patient felt to be intravascularly euvolemic.   - Replace electrolytes prn per protocol  - Strict I/O, daily standing weights per protocol  - Completed Leuprolide on 6/11/24 and radiation therapy on 6/27/24. Last PSA was <0.01 on 7/29/24   - Daily BMP    GI / Nutrition:   - Mechanical/dental soft diet, continue bowel regimen    Endocrine:  Stress-induced hyperglycemia, resolved  Hgb A1c 5.4%    - Initially managed on insulin drip postop, transitioned to sliding scale; discontinued POCT and sliding scale given maintenance of euglycemia without need for supplemental insulin    Infectious Disease:  Stress-induced leukocytosis, resolved  - WBC WNL, afebrile, no signs or symptoms of infection  - Completed perioperative antibiotics    Hematology:   Acute blood loss anemia  Thrombocytopenia, resolved  Warfarin AC for LVAD  Hgb stable; Plt WNL, no signs or symptoms of active bleeding    Anticoagulation:   - Coumadin for LVAD, INR goal 2-3. Most recent INR therapeutic.    - LIH discontinued    Prophylaxis:   - Stress ulcer prophylaxis: Pantoprazole 40 mg daily for 30 days post-operatively  - DVT prophylaxis: warfarin AC, SCD, OOB/activity    Disposition:   - Transferred to  on 9/23  - Therapies recommending discharge to ARU vs TCU  - Medically Ready for Discharge: Anticipated in 2-4 Days  Barriers to discharge include chest tubes.     Discussed with Dr. Mulvihill.     Rashard Santoyo PA-C  Cardiothoracic Surgery    3:21 PM  September 27, 2024  pager [phone]          Interval History:     No acute events overnight.   States pain is generally well controlled other than some chronic back pain.  Tolerating diet with good appetite, + BM. No nausea or vomiting.  Denies dyspnea but does endorse fatigue with activity.  Denies chest pain, dizziness/lightheadedness, and sternal popping/clicking/snapping.         " Physical Exam:   Blood pressure (!) 88/71, pulse 94, temperature 98.5  F (36.9  C), temperature source Oral, resp. rate 18, height 1.676 m (5' 5.98\"), weight 73 kg (160 lb 15 oz), SpO2 97%.  Vitals:    24 0400 24 0233 24 0500   Weight: 73.8 kg (162 lb 11.2 oz) 73 kg (161 lb) 73 kg (160 lb 15 oz)      Admission weight: 66 kg  24 hr Fluid status:  -519 mL    Gen: NAD, resting in bed, pleasant, calm, conversant, cooperative on exam  Neuro: A&Ox4, no focal deficits, face symmetric, speech clear, TORO  CV: RRR on monitor, LVAD hum  Pulm: unlabored breathing on RA, CTAB  Abd: nondistended, non-tender  Ext: 1+ peripheral edema bilaterally, no gross deformities  Incision: clean, dry, intact, no erythema or induration, sternum stable  Tubes/drain sites: dressing clean and dry, serous output, no air leak  Lines: driveline dressing clean and dry     Heartmate 3 LEFT VS  Flow (Lpm): 3.8 Lpm  Pulse Index (PI): 3.4 PI  Speed (rpm): 5200 rpm  Power (jackson): 3.4 jackson  Current Hct settin    Clinically Significant Risk Factors         # Hyponatremia: Lowest Na = 128 mmol/L in last 2 days, will monitor as appropriate      # Hypoalbuminemia: Lowest albumin = 2.2 g/dL at 2024  3:18 AM, will monitor as appropriate        # End stage heart failure: Ventricular assist device (VAD) present          # Overweight: Estimated body mass index is 25.99 kg/m  as calculated from the following:    Height as of this encounter: 1.676 m (5' 5.98\").    Weight as of this encounter: 73 kg (160 lb 15 oz).   # Moderate Malnutrition: based on nutrition assessment      # Financial/Environmental Concerns: none   # ICD device present            Data:    Imaging:  reviewed recent imaging    Recent Results (from the past 24 hour(s))   XR Chest Port 1 View    Narrative    EXAM: XR CHEST PORT 1 VIEW  2024 6:30 AM      HISTORY: chest tubes s/p LVAD insertion    COMPARISON: 2024    FINDINGS: Single view of the chest. Right upper " extremity PICC tip  terminates at the superior cavoatrial junction. Stable LVAD, left  chest tube, left chest wall cardiac device. Trachea is midline.  Cardiac silhouette is stable. Improved bibasilar aeration. Small  bilateral pleural effusions. No pneumothorax.      Impression    IMPRESSION: Improved aeration in the lung bases, with bibasilar and  perihilar opacities. Small pleural effusions. Stable support devices.    I have personally reviewed the examination and initial interpretation  and I agree with the findings.    SAPNA KAHN MD         SYSTEM ID:  [id number]   Echo Limited   Result Value    LVEF  <20%    [facility]    [id number]  [id number]  [id number]  [id number]^REX^SHANICE^LANA     [facility]  Echocardiography Laboratory  [address] [place] [id number]     Name: JOHNSON, DUANE C  MRN: [id number]  : 1950  Study Date: 2024 01:29 PM  Age: 74 yrs  Gender: Male  Patient Location: [facility] [place] [facility]  Reason For Study: Heart Failure  Ordering Physician: SHANICE GOODMAN  Performed By: Katerine Adler RDCS     BSA: 1.8 m2  Height: 65 in  Weight: 160 lb  BP: 88/71 mmHg  ______________________________________________________________________________  Procedure  Limited Portable Echo Adult.  ______________________________________________________________________________  Interpretation Summary  LVAD cannula was seen in the expected anatomic position in the LV apex.  HM3 at 5200RPM.  LVIDd 40mm.  Septum normal.  Aortic valve remain closed. No AI.  Normal flow velocities.  Global right ventricular function is moderately reduced.  The inferior vena cava is normal.  No pericardial effusion is present.  ______________________________________________________________________________  Left Ventricle  The visual ejection fraction is <20%. LVAD cannula was seen in the expected  anatomic position in the LV apex. HM3 at 5200RPM.  LVIDd 40mm.  Septum normal.  Aortic valve remain closed. No  AI.  Normal flow velocities.     Right Ventricle  Global right ventricular function is moderately reduced.     Tricuspid Valve  Mild to moderate tricuspid insufficiency is present. The right ventricular  systolic pressure is approximated at 14.0 mmHg plus the right atrial pressure.     Vessels  The inferior vena cava is normal.     Pericardium  No pericardial effusion is present.  ______________________________________________________________________________  MMode/2D Measurements & Calculations  IVSd: 1.2 cm  LVIDd: 4.0 cm  LVIDs: 3.5 cm  LVPWd: 1.6 cm  FS: 12.1 %  LV mass(C)d: 209.7 grams  LV mass(C)dI: 116.5 grams/m2  RWT: 0.78     Doppler Measurements & Calculations  TR max rfancisco j: 186.8 cm/sec  TR max P.0 mmHg     ______________________________________________________________________________  Report approved by: Eusebio Terry 2024 02:49 PM             Labs:  [facility]   Lab 24  0441 24  0504 24  1115 24  0733 24  0520 24  0752 24  0436   * 129*  --   --  130*  --  131*   POTASSIUM 4.3 4.4  --   --  4.2  --  4.3   CHLORIDE 97* 98  --   --  101  --  98   PRANAV 7.8* 7.6*  --   --  7.8*  --  7.8*   CO2 25 24  --   --  26  --  27   BUN 20.1 26.6*  --   --  27.5*  --  26.8*   CR 0.64* 0.63*  --   --  0.64*  --  0.65*   GLC 91 83 115* 93 90   < > 88    < > = values in this interval not displayed.     CBC  Recent Labs   Lab 24  0504 24  0520 24  0436   WBC 8.1 8.5 6.7 8.6   RBC 2.55* 2.51* 2.56* 2.53*   HGB 7.5* 7.6* 7.9* 7.8*   HCT 24.3* 24.0* 24.5* 24.0*   MCV 95 96 96 95   MCH 29.4 30.3 30.9 30.8   MCHC 30.9* 31.7 32.2 32.5   RDW 16.7* 16.5* 16.5* 16.8*    218 196 164     INR  Recent Labs   Lab 24  0504 24  0902 24  0520   INR 2.72* 2.97* 2.93* 2.91*      Hepatic Panel  Recent Labs   Lab 24  0504 24  0520 24  0436   AST 34 34 23 24   ALT 28 29 15 17    ALKPHOS 76 79 68 69   BILITOTAL 0.3 0.3 0.4 0.4   ALBUMIN 2.4* 2.4* 2.3* 2.3*     GLUCOSE:   Recent Labs   Lab 09/27/24  0441 09/26/24  0504 09/25/24  1115 09/25/24  0733 09/25/24  0520 09/24/24  2127   GLC 91 83 115* 93 90 109*

## 2024-09-27 NOTE — PROGRESS NOTES
[facility]   Cardiology II Service / Advanced Heart Failure  Daily Consult Note      Patient: Duane C Johnson  MRN: [id number]  Admission Date: 8/30/2024  Hospital Day # 28    Assessment and Plan: Duane C Johnson is a 74 year old male pmhx of anterior STEMI s/p PCI to the pLAD on 10/26/23 with a VT/VF arrest the next day (10/27) with patent stents and unchanged anatomy on repeat angiogram. Placed on amiodarone and discharged 11/03/23, ICD was not indicated as this was within 48h of his MI. His EF prior to discharge was 20-30% with a large area of akinesis in the LAD, placed on apixaban due to this (Apixaban dcd on 7/5/24). Has had multiple admissions for HF exacerbation last year.  Admitted on 8/30/24 for CHF exacerbation with BNP 16k. CPX and RHC showed significant functional limitations due to heart failue. Underwent HM3 LVAD on 9/18/24.       Recommendations:  - limited TTE  - continue lisinopril 2.5 mg daily, increase to 5 mg if MAP consistently > 90  - compression stockings per nursing/consider lymphedema consult   - LVAD teaching ongoing  - daily standing weights   - encourage PO intake  - encourage pulmonary hygiene/OOB activity during daytime       # Acute on chronic systolic heart failure secondary to ICM   # s/p HM3 LVAD as DT on 9/18/2024  # CAD s/p PCI  # History of STEMI  # s/p ICD 5/2024  Stage D. NYHA Class III.    Fluid status: grossly euvolemic   ACEi/ARB/ARNi: continue lisinopril 2.5 mg    Afterload reduction: defer   BB recent LVAD  Aldosterone antagonist deferred while other medical therapy is prioritized  SCD prophylaxis ICD  MAP: goal 65-85, current MAPs 84-90  LDH trends: 282, stable  Anticoagulation: warfarin dosing per pharmacy, INR goal 2-3, today 2.72  Antiplatelet: ASA not indicated in LVAD population, > 6 months s/p STEMI    # History of VT/VF arrest 2023  # AFib s/p DCCV 9/2024  - continue amiodarone 200 mg daily   - A/C as above     # Hyponatremia hypovolemia   Na 128,  "ranging 128-133  - encourage PO intake  - hold diuresis at this time  - daily BMP      Time/Communication  I spent 40 minutes reviewing results, care team notes, meeting directly with the patient, coordinating care, and completing documentation on the day of service.     Patient discussed with Dr. Hernandez.      Sirisha Arias, CHICO, FNP-C  Advanced Heart Failure/Cardiology II Service  Vocera Preferred or Pager [phone]  ================================================================    Subjective/24-Hr Events:   Last 24 hr care team notes reviewed. No overnight events. Slept well. Back pain chief complaint. Breathing comfortable. Feeling weak. A little lightheaded with standing. Notes bilateral peripheral edema. No fevers, chills, bleeding concerns. No HA, vision changes, stroke symptoms. + BM. Teeth pulled, eating soft diet.     ROS:  4 point ROS including respiratory, CV, GI and  (other than that noted in the HPI) is negative.     Medications: Reviewed in EPIC.     Physical Exam:   BP (!) 88/71 (BP Location: Left arm)   Pulse 94   Temp 98.5  F (36.9  C) (Oral)   Resp 18   Ht 1.676 m (5' 5.98\")   Wt 73 kg (160 lb 15 oz)   SpO2 97%   BMI 25.99 kg/m      GENERAL: Appears comfortable, in no distress.  HEENT: Eye symmetrical, no discharge or icterus bilaterally. Mucous membranes moist and without lesions.  NECK: Supple, JVD 2 cm above clavicle ar 60 degrees    CV: + LVAD hum  RESPIRATORY: Respirations regular, even, and unlabored. Lungs CTA throughout.    GI: Soft and non distended with normoactive bowel sounds present in all quadrants. No tenderness, rebound, guarding.   EXTREMITIES: 1+ BLE peripheral edema. 2+ bilateral pedal pulses.   NEUROLOGIC: Alert and oriented x 3. No focal deficits.   MUSCULOSKELETAL: No joint swelling or tenderness.   SKIN: No jaundice. No rashes or lesions.     Labs:  CMP  Recent Labs   Lab 09/27/24  0441 09/26/24  0504 09/25/24  1115 09/25/24  0733 09/25/24  0520 09/24/24  0752 " 09/24/24 0436   * 129*  --   --  130*  --  131*   POTASSIUM 4.3 4.4  --   --  4.2  --  4.3   CHLORIDE 97* 98  --   --  101  --  98   CO2 25 24  --   --  26  --  27   ANIONGAP 6* 7  --   --  3*  --  6*   GLC 91 83 115* 93 90   < > 88   BUN 20.1 26.6*  --   --  27.5*  --  26.8*   CR 0.64* 0.63*  --   --  0.64*  --  0.65*   GFRESTIMATED >90 >90  --   --  >90  --  >90   PRANAV 7.8* 7.6*  --   --  7.8*  --  7.8*   MAG 1.9 2.0  --   --  2.0  --  1.9   PHOS 3.0 2.9  --   --  3.1  --  2.9   PROTTOTAL 5.3* 5.3*  --   --  5.3*  --  5.1*   ALBUMIN 2.4* 2.4*  --   --  2.3*  --  2.3*   BILITOTAL 0.3 0.3  --   --  0.4  --  0.4   ALKPHOS 76 79  --   --  68  --  69   AST 34 34  --   --  23  --  24   ALT 28 29  --   --  15  --  17    < > = values in this interval not displayed.       CBC  Recent Labs   Lab 09/27/24 0441 09/26/24 0504 09/25/24  0520 09/24/24 0436   WBC 8.1 8.5 6.7 8.6   RBC 2.55* 2.51* 2.56* 2.53*   HGB 7.5* 7.6* 7.9* 7.8*   HCT 24.3* 24.0* 24.5* 24.0*   MCV 95 96 96 95   MCH 29.4 30.3 30.9 30.8   MCHC 30.9* 31.7 32.2 32.5   RDW 16.7* 16.5* 16.5* 16.8*    218 196 164       INR  Recent Labs   Lab 09/27/24 0441 09/26/24  0504 09/25/24  0902 09/25/24  0520   INR 2.72* 2.97* 2.93* 2.91*

## 2024-09-27 NOTE — PROCEDURES
The patient's HeartMate LVAD was interrogated 9/27/2024  * Speed 5200 rpm   * Pulsatility index 3.1   * Power 3.4 Dahl   * Flow 3.8 L/minute   Fluid status: euvolemic   Alarms were reviewed, and notable for rare PI events, no power spikes, speed drops, or other findings suspicious of pump malfunction noted.   The driveline exit site was inspected, c/d/i.   All external components were inspected and showed no evidence of damage or malfunction, none replaced.   No changes to VAD settings made

## 2024-09-27 NOTE — PROGRESS NOTES
Care Management Discharge Note    Discharge Date: 10/01/2024 (potential for over the weekend)     Discharge Disposition: [facility]    Discharge Services: Inpatient PT/OT    Discharge DME: None    Discharge Transportation: [facility] stretcher transport - will need to be set up by [facility]    Private pay costs discussed: Not applicable    Does the patient's insurance plan have a 3 day qualifying hospital stay waiver?  Yes     Which insurance plan 3 day waiver is available? [employer]    Will the waiver be used for post-acute placement? No    PAS Confirmation Code:    Patient/family educated on Medicare website which has current facility and service quality ratings: no    Education Provided on the Discharge Plan: Yes  Persons Notified of Discharge Plans: Patient and Wife (Melissa)  Patient/Family in Agreement with the Plan: yes    Handoff Referral Completed: No    Plan for 24 hour care for immediate post VAD period: Patient's wife Melissa will be primary caregiver. They will return to their home in [place], [place].    Education and resources provided by SW at discharge: role of VAD  in out patient setting and provided contact info for , and availability of support groups.    Plan: Current PT recs for [facility]. [facility] following. Weekend SW to check in with [facility] is medically ready.     Additional Information:  Duane received his LVAD on 9/18/24 and is anticipated to be medically ready to discharge to [facility] as early as Sunday. [facility] is following. Weekend SW to check in with primary provider Sunday on medical readiness. If Pt medically ready, weekend SW to update [facility] liaison. [facility] currently following. If Pt medically ready and there is a bed available, weekend SW will set up transportation.    Writer updated Pt and wife of recommendation for [facility] once medically ready. Pt agreeable to discharge when medically ready and verbalized understanding plan of care.    Following [facility],  patient and wife will begin their 24/7-30 day care-giver support plan at their house in [place], [place] with wife being primary caregiver.    Wife inquired into handicap parking pass for Duane. Discussed accessing paperwork on DMV website. Duane and Wife agreeable to plan of care. Weekend SW to support if Pt medically ready over the weekend. If Pt not medically ready over the weekend, primary SW to continue to follow next week and support discharge to  ARU.    Jessi Almendarez, MSW, LGSW, APSW  Heart Transplant/[facility]   Teams/Vocera  Ph. [phone]

## 2024-09-28 ENCOUNTER — APPOINTMENT (OUTPATIENT)
Dept: PHYSICAL THERAPY | Facility: CLINIC | Age: 74
DRG: 001 | End: 2024-09-28
Attending: INTERNAL MEDICINE
Payer: MEDICARE

## 2024-09-28 ENCOUNTER — APPOINTMENT (OUTPATIENT)
Dept: GENERAL RADIOLOGY | Facility: CLINIC | Age: 74
DRG: 001 | End: 2024-09-28
Attending: INTERNAL MEDICINE
Payer: MEDICARE

## 2024-09-28 LAB
ANION GAP SERPL CALCULATED.3IONS-SCNC: 6 MMOL/L (ref 7–15)
BUN SERPL-MCNC: 17.7 MG/DL (ref 8–23)
CALCIUM SERPL-MCNC: 7.8 MG/DL (ref 8.8–10.4)
CHLORIDE SERPL-SCNC: 98 MMOL/L (ref 98–107)
CREAT SERPL-MCNC: 0.66 MG/DL (ref 0.67–1.17)
EGFRCR SERPLBLD CKD-EPI 2021: >90 ML/MIN/1.73M2
ERYTHROCYTE [DISTWIDTH] IN BLOOD BY AUTOMATED COUNT: 16.6 % (ref 10–15)
FERRITIN SERPL-MCNC: 301 NG/ML (ref 31–409)
GLUCOSE SERPL-MCNC: 93 MG/DL (ref 70–99)
HCO3 SERPL-SCNC: 25 MMOL/L (ref 22–29)
HCT VFR BLD AUTO: 25 % (ref 40–53)
HGB BLD-MCNC: 7.9 G/DL (ref 13.3–17.7)
INR PPP: 3 (ref 0.85–1.15)
IRON BINDING CAPACITY (ROCHE): 178 UG/DL (ref 240–430)
IRON SATN MFR SERPL: 13 % (ref 15–46)
IRON SERPL-MCNC: 23 UG/DL (ref 61–157)
LDH SERPL L TO P-CCNC: 290 U/L (ref 0–250)
MAGNESIUM SERPL-MCNC: 1.8 MG/DL (ref 1.7–2.3)
MCH RBC QN AUTO: 30 PG (ref 26.5–33)
MCHC RBC AUTO-ENTMCNC: 31.6 G/DL (ref 31.5–36.5)
MCV RBC AUTO: 95 FL (ref 78–100)
PHOSPHATE SERPL-MCNC: 3.1 MG/DL (ref 2.5–4.5)
PLATELET # BLD AUTO: 250 10E3/UL (ref 150–450)
POTASSIUM SERPL-SCNC: 4.8 MMOL/L (ref 3.4–5.3)
RBC # BLD AUTO: 2.63 10E6/UL (ref 4.4–5.9)
RETICS # AUTO: 0.14 10E6/UL (ref 0.03–0.1)
RETICS/RBC NFR AUTO: 5.4 % (ref 0.5–2)
SODIUM SERPL-SCNC: 129 MMOL/L (ref 135–145)
WBC # BLD AUTO: 9 10E3/UL (ref 4–11)

## 2024-09-28 PROCEDURE — 83735 ASSAY OF MAGNESIUM: CPT

## 2024-09-28 PROCEDURE — 71045 X-RAY EXAM CHEST 1 VIEW: CPT | Mod: 26 | Performed by: RADIOLOGY

## 2024-09-28 PROCEDURE — 85610 PROTHROMBIN TIME: CPT

## 2024-09-28 PROCEDURE — 250N000013 HC RX MED GY IP 250 OP 250 PS 637: Performed by: SURGERY

## 2024-09-28 PROCEDURE — 85045 AUTOMATED RETICULOCYTE COUNT: CPT | Performed by: INTERNAL MEDICINE

## 2024-09-28 PROCEDURE — 85027 COMPLETE CBC AUTOMATED: CPT

## 2024-09-28 PROCEDURE — 250N000013 HC RX MED GY IP 250 OP 250 PS 637: Performed by: PHYSICIAN ASSISTANT

## 2024-09-28 PROCEDURE — 71045 X-RAY EXAM CHEST 1 VIEW: CPT

## 2024-09-28 PROCEDURE — 250N000013 HC RX MED GY IP 250 OP 250 PS 637: Performed by: INTERNAL MEDICINE

## 2024-09-28 PROCEDURE — 250N000011 HC RX IP 250 OP 636: Performed by: STUDENT IN AN ORGANIZED HEALTH CARE EDUCATION/TRAINING PROGRAM

## 2024-09-28 PROCEDURE — 99231 SBSQ HOSP IP/OBS SF/LOW 25: CPT | Mod: GC | Performed by: INTERNAL MEDICINE

## 2024-09-28 PROCEDURE — 250N000013 HC RX MED GY IP 250 OP 250 PS 637: Performed by: NURSE PRACTITIONER

## 2024-09-28 PROCEDURE — 97530 THERAPEUTIC ACTIVITIES: CPT | Mod: GP

## 2024-09-28 PROCEDURE — 97116 GAIT TRAINING THERAPY: CPT | Mod: GP

## 2024-09-28 PROCEDURE — 250N000011 HC RX IP 250 OP 636: Performed by: INTERNAL MEDICINE

## 2024-09-28 PROCEDURE — 80048 BASIC METABOLIC PNL TOTAL CA: CPT | Performed by: NURSE PRACTITIONER

## 2024-09-28 PROCEDURE — 258N000003 HC RX IP 258 OP 636: Performed by: INTERNAL MEDICINE

## 2024-09-28 PROCEDURE — 83615 LACTATE (LD) (LDH) ENZYME: CPT | Performed by: INTERNAL MEDICINE

## 2024-09-28 PROCEDURE — 120N000005 HC R&B MS OVERFLOW UMMC

## 2024-09-28 PROCEDURE — 84100 ASSAY OF PHOSPHORUS: CPT

## 2024-09-28 RX ORDER — MAGNESIUM OXIDE 400 MG/1
400 TABLET ORAL EVERY 4 HOURS
Status: COMPLETED | OUTPATIENT
Start: 2024-09-28 | End: 2024-09-28

## 2024-09-28 RX ORDER — DIPHENHYDRAMINE HYDROCHLORIDE 50 MG/ML
25 INJECTION INTRAMUSCULAR; INTRAVENOUS EVERY 6 HOURS PRN
Status: DISCONTINUED | OUTPATIENT
Start: 2024-09-28 | End: 2024-10-05 | Stop reason: HOSPADM

## 2024-09-28 RX ORDER — WARFARIN SODIUM 2.5 MG/1
2.5 TABLET ORAL
Status: COMPLETED | OUTPATIENT
Start: 2024-09-28 | End: 2024-09-28

## 2024-09-28 RX ORDER — METHYLPREDNISOLONE SODIUM SUCCINATE 125 MG/2ML
125 INJECTION, POWDER, LYOPHILIZED, FOR SOLUTION INTRAMUSCULAR; INTRAVENOUS
Status: DISCONTINUED | OUTPATIENT
Start: 2024-09-28 | End: 2024-10-03

## 2024-09-28 RX ORDER — DIPHENHYDRAMINE HCL 25 MG
25 CAPSULE ORAL EVERY 6 HOURS PRN
Status: DISCONTINUED | OUTPATIENT
Start: 2024-09-28 | End: 2024-10-05 | Stop reason: HOSPADM

## 2024-09-28 RX ORDER — DIPHENHYDRAMINE HYDROCHLORIDE 50 MG/ML
50 INJECTION INTRAMUSCULAR; INTRAVENOUS
Status: DISCONTINUED | OUTPATIENT
Start: 2024-09-28 | End: 2024-10-03

## 2024-09-28 RX ADMIN — ATORVASTATIN CALCIUM 80 MG: 80 TABLET, FILM COATED ORAL at 20:19

## 2024-09-28 RX ADMIN — LISINOPRIL 2.5 MG: 2.5 TABLET ORAL at 09:01

## 2024-09-28 RX ADMIN — ACETAMINOPHEN 975 MG: 325 TABLET ORAL at 09:03

## 2024-09-28 RX ADMIN — METHOCARBAMOL TABLETS 750 MG: 750 TABLET, COATED ORAL at 20:18

## 2024-09-28 RX ADMIN — IRON SUCROSE 200 MG: 20 INJECTION, SOLUTION INTRAVENOUS at 17:57

## 2024-09-28 RX ADMIN — Medication 10 MG: at 20:19

## 2024-09-28 RX ADMIN — MAGNESIUM OXIDE TAB 400 MG (241.3 MG ELEMENTAL MG) 400 MG: 400 (241.3 MG) TAB at 10:00

## 2024-09-28 RX ADMIN — HEPARIN, PORCINE (PF) 10 UNIT/ML INTRAVENOUS SYRINGE 5 ML: at 19:06

## 2024-09-28 RX ADMIN — THERA TABS 1 TABLET: TAB at 09:02

## 2024-09-28 RX ADMIN — WARFARIN SODIUM 2.5 MG: 2.5 TABLET ORAL at 17:46

## 2024-09-28 RX ADMIN — ACETAMINOPHEN 650 MG: 325 TABLET ORAL at 03:43

## 2024-09-28 RX ADMIN — ACETAMINOPHEN 975 MG: 325 TABLET ORAL at 16:02

## 2024-09-28 RX ADMIN — ESCITALOPRAM OXALATE 10 MG: 10 TABLET ORAL at 09:01

## 2024-09-28 RX ADMIN — MAGNESIUM OXIDE TAB 400 MG (241.3 MG ELEMENTAL MG) 400 MG: 400 (241.3 MG) TAB at 06:27

## 2024-09-28 RX ADMIN — METHOCARBAMOL TABLETS 750 MG: 750 TABLET, COATED ORAL at 14:22

## 2024-09-28 RX ADMIN — METHOCARBAMOL TABLETS 750 MG: 750 TABLET, COATED ORAL at 09:01

## 2024-09-28 RX ADMIN — AMIODARONE HYDROCHLORIDE 200 MG: 200 TABLET ORAL at 09:01

## 2024-09-28 RX ADMIN — Medication 5 ML: at 04:50

## 2024-09-28 RX ADMIN — GABAPENTIN 100 MG: 100 CAPSULE ORAL at 21:34

## 2024-09-28 RX ADMIN — PANTOPRAZOLE SODIUM 40 MG: 40 TABLET, DELAYED RELEASE ORAL at 09:01

## 2024-09-28 ASSESSMENT — ACTIVITIES OF DAILY LIVING (ADL)
ADLS_ACUITY_SCORE: 34
ADLS_ACUITY_SCORE: 31
ADLS_ACUITY_SCORE: 34
ADLS_ACUITY_SCORE: 34
ADLS_ACUITY_SCORE: 31
ADLS_ACUITY_SCORE: 34
ADLS_ACUITY_SCORE: 34
ADLS_ACUITY_SCORE: 31
ADLS_ACUITY_SCORE: 34
ADLS_ACUITY_SCORE: 34
ADLS_ACUITY_SCORE: 31
ADLS_ACUITY_SCORE: 31
ADLS_ACUITY_SCORE: 34
ADLS_ACUITY_SCORE: 31
ADLS_ACUITY_SCORE: 31
ADLS_ACUITY_SCORE: 34
ADLS_ACUITY_SCORE: 31
ADLS_ACUITY_SCORE: 31
ADLS_ACUITY_SCORE: 34
ADLS_ACUITY_SCORE: 31

## 2024-09-28 NOTE — PROGRESS NOTES
Cardiovascular Surgery Progress Note  09/28/2024         Assessment and Plan:     Duane C Johnson is a 74 year old male with a PMH of  HFrEF 2/2 ICM with LVEF 20-30%, atrial fibrillation, 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 Due to worsening functional limitations due to heart failure, patient was worked up for VAD while inpatient. Patient is now s/p implantation of HeartMate 3 LVAD on 9/18/2024 with Dr. Mulvihill. Returned to OR on POD 0 for mediastinal re-exploration, mediastinal clot evacuation, and chest washout.     Cardiovascular:   HFrEF 2/2 ICM - s/p HM3 LVAD implant 9/18/24  Cardiogenic shock, improved  Hx afib s/p DCCV (9/5/24)  Hx VT/VF arrest with ICD in place (5/8/24)  Hx STEMI, CAD s/p PCI to LAD (2023)  HTN  HLD  Continued rate controlled afib since at least 9/21 (per EKG)   Preop echo showed LV EF 10-15%  Postop echo 9/27 showed LVAD cannula seen in the expected anatomic position in the LV apex, normal flow velocities, moderately reduced RV function, normal IVC   - ASA not indicated  - Atorvastatin 80 mg daily  - PTA amiodarone 200 mg daily  - lisinopril 2.5 mg daily started 9/26 for MAPs in the 90s  - Cards 2 following for heart failure optimization, appreciate assistance  - will discuss plan for afib management with cardiology (appears he's been in rate controlled afib since at least 9/21)    Chest tubes: mediastinal tubes removed 9/24, R pleural removed 9/28. Plan to keep pericardial drain until a bit drier per surgeon.     Pulmonary:  - Extubated POD 2; now saturating well on RA   - Supplemental O2 PRN to keep sats > 92%  - Pulm toilet, IS, OOB/activity and deep breathing    Neurology / MSK:  Acute post-operative pain  - Multimodal analgesia with acetaminophen, oxycodone PRN, Robaxin PRN, HS gabapentin    Anxiety  - PTA Lexapro resumed    S/p sternotomy   - PT/OT/CR consults   -  Sternal precautions    Insomnia  - melatonin increased from 5 mg to 10 mg HS 9/28     / Renal / FE:  Hyponatremia  Hx prostate cancer s/p Leuprolide/radiation therapy   - Baseline creatinine 0.9-1  - Diuresis: continue to hold diuresis as patient felt to be intravascularly euvolemic  - lymphedema wraps ordered 9/28  - Replace electrolytes prn per protocol  - Strict I/O, daily standing weights per protocol  - Completed Leuprolide on 6/11/24 and radiation therapy on 6/27/24. Last PSA was <0.01 on 7/29/24   - Daily BMP    GI / Nutrition:   - Mechanical/dental soft diet, continue bowel regimen    Endocrine:  Stress-induced hyperglycemia, resolved  Hgb A1c 5.4%    - Initially managed on insulin drip postop, transitioned to sliding scale; discontinued POCT and sliding scale given maintenance of euglycemia without need for supplemental insulin    Infectious Disease:  Stress-induced leukocytosis, resolved  - WBC WNL, afebrile, no signs or symptoms of infection  - Completed perioperative antibiotics    Hematology:   Acute blood loss anemia  Thrombocytopenia, resolved  Warfarin AC for LVAD  Hgb stable; Plt WNL, no signs or symptoms of active bleeding    Anticoagulation:   - Coumadin for LVAD, INR goal 2-3. Most recent INR therapeutic.    - LIH discontinued    Prophylaxis:   - Stress ulcer prophylaxis: Pantoprazole 40 mg daily for 30 days post-operatively  - DVT prophylaxis: warfarin AC, SCD, OOB/activity    Disposition:   - Transferred to  on 9/23  - Therapies recommending discharge to ARU vs TCU  - Medically Ready for Discharge: Anticipated in 2-4 Days  Barriers to discharge include chest tubes.     Discussed with Dr. Mulvihill.     Rashard Santoyo PA-C  Cardiothoracic Surgery    2:26 PM  September 28, 2024  pager [phone]          Interval History:     No acute events overnight. Did not sleep well overnight. Offered to increase his melatonin which he agrees to.  States pain is generally well controlled.   Tolerating diet  "with good appetite, + BM. No nausea or vomiting.  Denies dyspnea but does endorse fatigue with activity.  Denies chest pain, dizziness/lightheadedness, and sternal popping/clicking/snapping.         Physical Exam:   Blood pressure (!) 81/69, pulse 86, temperature 98.3  F (36.8  C), temperature source Oral, resp. rate 18, height 1.676 m (5' 5.98\"), weight 71.7 kg (158 lb 1.6 oz), SpO2 99%.  Vitals:    24 0233 24 0500 24 0633   Weight: 73 kg (161 lb) 73 kg (160 lb 15 oz) 71.7 kg (158 lb 1.6 oz)      Admission weight: 66 kg  24 hr Fluid status:  -760 mL    Gen: NAD, resting in bed, pleasant, calm, conversant, cooperative on exam  Neuro: A&Ox4, no focal deficits, face symmetric, speech clear, TORO  CV: RRR on monitor, LVAD hum  Pulm: unlabored breathing on RA, CTAB  Abd: nondistended, non-tender  Ext: 1+ peripheral edema bilaterally, no gross deformities  Incision: clean, dry, intact, no erythema or induration, sternum stable  Tubes/drain sites: dressing clean and dry, serous output, no air leak  Lines: driveline dressing clean and dry     Heartmate 3 LEFT VS  Flow (Lpm): 3.6 Lpm  Pulse Index (PI): 3.9 PI  Speed (rpm): 5200 rpm  Power (jackson): 3.3 jackson  Current Hct settin    Clinically Significant Risk Factors         # Hyponatremia: Lowest Na = 128 mmol/L in last 2 days, will monitor as appropriate      # Hypoalbuminemia: Lowest albumin = 2.2 g/dL at 2024  3:18 AM, will monitor as appropriate        # End stage heart failure: Ventricular assist device (VAD) present          # Overweight: Estimated body mass index is 25.53 kg/m  as calculated from the following:    Height as of this encounter: 1.676 m (5' 5.98\").    Weight as of this encounter: 71.7 kg (158 lb 1.6 oz).   # Moderate Malnutrition: based on nutrition assessment      # Financial/Environmental Concerns: none   # ICD device present            Data:    Imaging:  reviewed recent imaging    Recent Results (from the past 24 hour(s)) "   Echo Limited   Result Value    LVEF  <20%    [facility]    [id number]  [id number]  [id number]  [id number]^REX^SHANICE^LANA     [facility],[place]  Echocardiography Laboratory  [address] [place] [id number]     Name: JOHNSON, DUANE C  MRN: [id number]  : 1950  Study Date: 2024 01:29 PM  Age: 74 yrs  Gender: Male  [place] [facility] [place]  Reason For Study: Heart Failure  Ordering Physician: SHANICE GOODMAN  Performed By: Katerine Adler RDCS     BSA: 1.8 m2  Height: 65 in  Weight: 160 lb  BP: 88/71 mmHg  ______________________________________________________________________________  Procedure  Limited Portable Echo Adult.  ______________________________________________________________________________  Interpretation Summary  LVAD cannula was seen in the expected anatomic position in the LV apex.  HM3 at 5200RPM.  LVIDd 40mm.  Septum normal.  Aortic valve remain closed. No AI.  Normal flow velocities.  Global right ventricular function is moderately reduced.  The inferior vena cava is normal.  No pericardial effusion is present.  ______________________________________________________________________________  Left Ventricle  The visual ejection fraction is <20%. LVAD cannula was seen in the expected  anatomic position in the LV apex. HM3 at 5200RPM.  LVIDd 40mm.  Septum normal.  Aortic valve remain closed. No AI.  Normal flow velocities.     Right Ventricle  Global right ventricular function is moderately reduced.     Tricuspid Valve  Mild to moderate tricuspid insufficiency is present. The right ventricular  systolic pressure is approximated at 14.0 mmHg plus the right atrial pressure.     Vessels  The inferior vena cava is normal.     Pericardium  No pericardial effusion is present.  ______________________________________________________________________________  MMode/2D Measurements & Calculations  IVSd: 1.2 cm  LVIDd: 4.0 cm  LVIDs: 3.5 cm  LVPWd: 1.6 cm  FS: 12.1 %  LV  mass(C)d: 209.7 grams  LV mass(C)dI: 116.5 grams/m2  RWT: 0.78     Doppler Measurements & Calculations  TR max francisco j: 186.8 cm/sec  TR max P.0 mmHg     ______________________________________________________________________________  Report approved by: Eusebio Terry 2024 02:49 PM         XR Chest Port 1 View    Narrative    Exam: XR CHEST PORT 1 VIEW, 2024 10:11 AM    Indication: chest tubes s/p LVAD insertion    Comparison: 2024    Findings:   ] Impression right PICC tip at the mid right atrium. Stable  pericardial/mediastinal drains, LVAD, and left chest wall ICD. Stable  enlarged cardiac silhouette. Coronary artery stents. The pulmonary  vasculature is indistinct. Continued small bilateral pleural  effusions. No appreciable pneumothorax. Stable streaky perihilar and  left greater than right basilar opacities.      Impression    Impression: Stable bilateral pleural effusions and streaky perihilar  and left greater than right basilar opacities.    PIERCE FERNANDEZ,          SYSTEM ID:  [id number]       Labs:  BMP  Recent Labs   Lab 24  04524  04424  0504 24  1115 24  0733 24  0520   * 128* 129*  --   --  130*   POTASSIUM 4.8 4.3 4.4  --   --  4.2   CHLORIDE 98 97* 98  --   --  101   PRANAV 7.8* 7.8* 7.6*  --   --  7.8*   CO2   --   --  26   BUN 17.7 20.1 26.6*  --   --  27.5*   CR 0.66* 0.64* 0.63*  --   --  0.64*   GLC 93 91 83 115*   < > 90    < > = values in this interval not displayed.     CBC  Recent Labs   Lab 24  0450 24  0441 24  0504 24  0520   WBC 9.0 8.1 8.5 6.7   RBC 2.63* 2.55* 2.51* 2.56*   HGB 7.9* 7.5* 7.6* 7.9*   HCT 25.0* 24.3* 24.0* 24.5*   MCV 95 95 96 96   MCH 30.0 29.4 30.3 30.9   MCHC 31.6 30.9* 31.7 32.2   RDW 16.6* 16.7* 16.5* 16.5*    235 218 196     INR  Recent Labs   Lab 24  0450 24  0441 24  0504 24  0902   INR 3.00* 2.72* 2.97* 2.93*      Hepatic Panel  Recent  Labs   Lab 09/27/24  0441 09/26/24  0504 09/25/24  0520 09/24/24  0436   AST 34 34 23 24   ALT 28 29 15 17   ALKPHOS 76 79 68 69   BILITOTAL 0.3 0.3 0.4 0.4   ALBUMIN 2.4* 2.4* 2.3* 2.3*     GLUCOSE:   Recent Labs   Lab 09/28/24  0450 09/27/24  0441 09/26/24  0504 09/25/24  1115 09/25/24  0733 09/25/24  0520   GLC 93 91 83 115* 93 90

## 2024-09-28 NOTE — PROGRESS NOTES
Vitals: .Temp: 98.9  F (37.2  C) Temp src: Oral   Pulse: 84   Resp: 16 SpO2: 94 % O2 Device: None (Room air)     Cardiac: Afib 80s-90s. Denies Chest pain/SOB. HM3 #s WDL. Doppler MAPs 88-92.   LDA's: 2 L PICC in RUE HL. 2 Chest tubes to suction into 1 atrium. 80 mLs out.  Pain: 2-3/10 pain. Chronic back or L rib pain. PRN tylenol x1.   RN Managed Protocols: Mag, Phos, K+. Just Mag needing PO replacement. 1st dose given.   Updates: Stable overnight. CMS intact. Vding. A/Ox4 but more forgetful as shift continued. Patient could not sleep overnight and is frustrated w/ lack of sleep. Patient wants to increase activity during the day.   Plan: Continue with POC. Notify primary team with changes.  Shift: [id number]    See flow sheets for detailed charting.

## 2024-09-28 NOTE — PLAN OF CARE
"Goal Outcome Evaluation:      Plan of Care Reviewed With: patient, friend    Overall Patient Progress: improvingOverall Patient Progress: improving     I: Monitored vitals and assessed pt status.   Changes during this shift: Pleural CT removed, Iron infusion given     Running:   PRN Med:      A:   Neuro: Ax4 Denies Headache, dizziness,  Lightheadedness, numbness and tingling. calls appropriately   Cardiac: HM3, numbers WNL, no alarms this shift. Afib, HR 80s Afebrile, VSS.  Denies chest pain.   Respiratory: sating >95 ON RA. denies SOB. LS clear bilaterally.   Diet/appetite: mechanical/soft Diet. Good appetite.   GI/:  1 BM this shift. Denies abdominal pain. Good urine output.   Activity:  Moves Ax1 with walker and GB  Pain: Acknowledges back pain per baseline, minor headache, and some \"rib pain\" managed with scheduled tylenol.   Skin: 1 Cts to suction, LVAD driveline site, Rt PIV SL, R DL PICC heparin locked.  Plan: Awaiting bed in ARU.      "

## 2024-09-28 NOTE — PROCEDURES
The patient's HeartMate LVAD was interrogated 9/28/2024  * Speed 5200 rpm   * Pulsatility index 4.0   * Power 3.4 Dahl   * Flow 3.5 L/minute   Fluid status: Euvolemic   Alarms were reviewed, and no alarms in last 24 hours  The driveline exit site was inspected, no evidence of infection.   All external components were inspected and showed no evidence of damage or malfunction, none replaced.   No changes to VAD settings made.    Eric Dixon, PGY-6  Cardiovascular Disease Fellow

## 2024-09-28 NOTE — PROGRESS NOTES
[facility]   Cardiology II Service / Advanced Heart Failure  Daily Consult Note      Patient: Duane C Johnson  MRN: [id number]  Admission Date: 8/30/2024  Hospital Day # 29    I personally saw and examined this patien residntt, reviewed imaging and laboratory studies, confirmed physical examination and discussed results and plan with patient and or family.      Assessment and Plan: Duane C Johnson is a 74 year old male pmhx of anterior STEMI s/p PCI to the pLAD on 10/26/23 with a VT/VF arrest the next day (10/27) with patent stents and unchanged anatomy on repeat angiogram. Placed on amiodarone and discharged 11/03/23, ICD was not indicated as this was within 48h of his MI. His EF prior to discharge was 20-30% with a large area of akinesis in the LAD, placed on apixaban due to this (Apixaban dcd on 7/5/24). Has had multiple admissions for HF exacerbation last year.  Admitted on 8/30/24 for CHF exacerbation with BNP 16k. CPX and RHC showed significant functional limitations due to heart failue. Underwent HM3 LVAD on 9/18/24.     Recommendations:  - continue lisinopril 2.5 mg daily, increase to 5 mg if MAP consistently > 90. Currently at goal  - Sent LDH and retic count for anemia. Iron studies  - Hold off on standing diuresis at this time.   - LVAD teaching ongoing  - daily standing weights   - encourage PO intake  - encourage pulmonary hygiene/OOB activity during daytime. Avoid daytime naps if possible to improve nighttime sleep hygiene. On melatonin.  - Pending ARU placement.     # Acute on chronic systolic heart failure secondary to ICM   # s/p HM3 LVAD as DT on 9/18/2024  # CAD s/p PCI  # History of STEMI  # s/p ICD 5/2024  Stage D. NYHA Class III.    Fluid status: grossly euvolemic   ACEi/ARB/ARNi: continue lisinopril 2.5 mg    Afterload reduction: defer   BB recent LVAD  Aldosterone antagonist deferred while other medical therapy is prioritized  SCD prophylaxis ICD  MAP: goal 65-85, current MAPs  Pts son called requesting to reschedule procedure, 8/20 does not work. Pls call pts son back, thank you.   "84-90  LDH trends: 282, stable  Anticoagulation: warfarin dosing per pharmacy, INR goal 2-3, today 2.72  Antiplatelet: ASA not indicated in LVAD population, > 6 months s/p STEMI  Limited TTE 9/27 with LVIDd 4.0 cm, Closed AoV, no AI, normal LVAD flow velocities, moderately reduced RV, normal IVC.     # anemia  - No overt bleeds, slow downtrend since surgery  - Added LDH, retic count  - Iron studies repeated    # History of VT/VF arrest 2023  # AFib s/p DCCV 9/2024  - continue amiodarone 200 mg daily   - A/C as above     # Hyponatremia hypovolemia   Na 128, ranging 128-133  - encourage PO intake  - hold diuresis at this time  - daily BMP    Time/Communication  I spent 30 minutes reviewing results, care team notes, meeting directly with the patient, coordinating care, and completing documentation on the day of service.     Patient discussed with Dr. Hernandez.      Eric Dixon, PGY-6  Cardiovascular Disease Fellow  ================================================================    Subjective/24-Hr Events:   No acute events overnight. Endorsed poor sleep overnight, but admits to taking a daytime nap. No chest pain aside from a focal area of R lower chest pain which is present with turns or on palpitation. No SOB. He seems to suggest that he had orthopnea a number of days ago, but this is now improved.     ROS:  4 point ROS including respiratory, CV, GI and  (other than that noted in the HPI) is negative.     Medications: Reviewed in EPIC.     Physical Exam:   BP (!) 88/71 (BP Location: Left arm)   Pulse 84   Temp 98.9  F (37.2  C) (Oral)   Resp 16   Ht 1.676 m (5' 5.98\")   Wt 71.7 kg (158 lb 1.6 oz)   SpO2 94%   BMI 25.53 kg/m      GENERAL: Appears comfortable, in no distress.  HEENT: Eye symmetrical, no discharge or icterus bilaterally. Mucous membranes moist and without lesions.  NECK: Supple, JVD 2 cm above clavicle ar 60 degrees    CV: + LVAD hum  RESPIRATORY: Respirations regular, even, and unlabored. " Lungs CTA throughout.    GI: Soft and non distended with normoactive bowel sounds present in all quadrants. No tenderness, rebound, guarding.   EXTREMITIES: 1+ BLE peripheral edema. 2+ bilateral pedal pulses.   NEUROLOGIC: Alert and oriented x 3. No focal deficits.   MUSCULOSKELETAL: No joint swelling or tenderness.   SKIN: No jaundice. No rashes or lesions.     Labs:  CMP  Recent Labs   Lab 09/28/24  0450 09/27/24  0441 09/26/24  0504 09/25/24  1115 09/25/24  0733 09/25/24  0520 09/24/24  0752 09/24/24  0436   * 128* 129*  --   --  130*  --  131*   POTASSIUM 4.8 4.3 4.4  --   --  4.2  --  4.3   CHLORIDE 98 97* 98  --   --  101  --  98   CO2 25 25 24  --   --  26  --  27   ANIONGAP 6* 6* 7  --   --  3*  --  6*   GLC 93 91 83 115*   < > 90   < > 88   BUN 17.7 20.1 26.6*  --   --  27.5*  --  26.8*   CR 0.66* 0.64* 0.63*  --   --  0.64*  --  0.65*   GFRESTIMATED >90 >90 >90  --   --  >90  --  >90   PRANAV 7.8* 7.8* 7.6*  --   --  7.8*  --  7.8*   MAG 1.8 1.9 2.0  --   --  2.0  --  1.9   PHOS 3.1 3.0 2.9  --   --  3.1  --  2.9   PROTTOTAL  --  5.3* 5.3*  --   --  5.3*  --  5.1*   ALBUMIN  --  2.4* 2.4*  --   --  2.3*  --  2.3*   BILITOTAL  --  0.3 0.3  --   --  0.4  --  0.4   ALKPHOS  --  76 79  --   --  68  --  69   AST  --  34 34  --   --  23  --  24   ALT  --  28 29  --   --  15  --  17    < > = values in this interval not displayed.       CBC  Recent Labs   Lab 09/28/24 0450 09/27/24  0441 09/26/24  0504 09/25/24  0520   WBC 9.0 8.1 8.5 6.7   RBC 2.63* 2.55* 2.51* 2.56*   HGB 7.9* 7.5* 7.6* 7.9*   HCT 25.0* 24.3* 24.0* 24.5*   MCV 95 95 96 96   MCH 30.0 29.4 30.3 30.9   MCHC 31.6 30.9* 31.7 32.2   RDW 16.6* 16.7* 16.5* 16.5*    235 218 196       INR  Recent Labs   Lab 09/28/24  0450 09/27/24  0441 09/26/24  0504 09/25/24  0902   INR 3.00* 2.72* 2.97* 2.93*

## 2024-09-29 ENCOUNTER — APPOINTMENT (OUTPATIENT)
Dept: OCCUPATIONAL THERAPY | Facility: CLINIC | Age: 74
DRG: 001 | End: 2024-09-29
Attending: INTERNAL MEDICINE
Payer: MEDICARE

## 2024-09-29 ENCOUNTER — APPOINTMENT (OUTPATIENT)
Dept: OCCUPATIONAL THERAPY | Facility: CLINIC | Age: 74
DRG: 001 | End: 2024-09-29
Attending: PHYSICIAN ASSISTANT
Payer: MEDICARE

## 2024-09-29 ENCOUNTER — APPOINTMENT (OUTPATIENT)
Dept: CT IMAGING | Facility: CLINIC | Age: 74
DRG: 001 | End: 2024-09-29
Attending: PHYSICIAN ASSISTANT
Payer: MEDICARE

## 2024-09-29 ENCOUNTER — APPOINTMENT (OUTPATIENT)
Dept: GENERAL RADIOLOGY | Facility: CLINIC | Age: 74
DRG: 001 | End: 2024-09-29
Attending: INTERNAL MEDICINE
Payer: MEDICARE

## 2024-09-29 LAB
ANION GAP SERPL CALCULATED.3IONS-SCNC: 7 MMOL/L (ref 7–15)
BUN SERPL-MCNC: 15.9 MG/DL (ref 8–23)
CALCIUM SERPL-MCNC: 7.9 MG/DL (ref 8.8–10.4)
CHLORIDE SERPL-SCNC: 99 MMOL/L (ref 98–107)
CREAT SERPL-MCNC: 0.64 MG/DL (ref 0.67–1.17)
EGFRCR SERPLBLD CKD-EPI 2021: >90 ML/MIN/1.73M2
ERYTHROCYTE [DISTWIDTH] IN BLOOD BY AUTOMATED COUNT: 16.6 % (ref 10–15)
GLUCOSE SERPL-MCNC: 88 MG/DL (ref 70–99)
HCO3 SERPL-SCNC: 24 MMOL/L (ref 22–29)
HCT VFR BLD AUTO: 24.6 % (ref 40–53)
HGB BLD-MCNC: 7.9 G/DL (ref 13.3–17.7)
INR PPP: 3.39 (ref 0.85–1.15)
LDH SERPL L TO P-CCNC: 271 U/L (ref 0–250)
MAGNESIUM SERPL-MCNC: 1.8 MG/DL (ref 1.7–2.3)
MCH RBC QN AUTO: 30.3 PG (ref 26.5–33)
MCHC RBC AUTO-ENTMCNC: 32.1 G/DL (ref 31.5–36.5)
MCV RBC AUTO: 94 FL (ref 78–100)
PHOSPHATE SERPL-MCNC: 3 MG/DL (ref 2.5–4.5)
PLATELET # BLD AUTO: 260 10E3/UL (ref 150–450)
POTASSIUM SERPL-SCNC: 4.3 MMOL/L (ref 3.4–5.3)
RBC # BLD AUTO: 2.61 10E6/UL (ref 4.4–5.9)
SODIUM SERPL-SCNC: 130 MMOL/L (ref 135–145)
WBC # BLD AUTO: 8.9 10E3/UL (ref 4–11)

## 2024-09-29 PROCEDURE — 70496 CT ANGIOGRAPHY HEAD: CPT | Mod: MG

## 2024-09-29 PROCEDURE — 99221 1ST HOSP IP/OBS SF/LOW 40: CPT | Mod: 4UV | Performed by: OPHTHALMOLOGY

## 2024-09-29 PROCEDURE — 999N000127 HC STATISTIC PERIPHERAL IV START W US GUIDANCE

## 2024-09-29 PROCEDURE — 250N000011 HC RX IP 250 OP 636: Performed by: STUDENT IN AN ORGANIZED HEALTH CARE EDUCATION/TRAINING PROGRAM

## 2024-09-29 PROCEDURE — 99233 SBSQ HOSP IP/OBS HIGH 50: CPT | Mod: 25 | Performed by: INTERNAL MEDICINE

## 2024-09-29 PROCEDURE — G1010 CDSM STANSON: HCPCS

## 2024-09-29 PROCEDURE — 83615 LACTATE (LD) (LDH) ENZYME: CPT | Performed by: INTERNAL MEDICINE

## 2024-09-29 PROCEDURE — 71045 X-RAY EXAM CHEST 1 VIEW: CPT | Mod: 26 | Performed by: RADIOLOGY

## 2024-09-29 PROCEDURE — 97110 THERAPEUTIC EXERCISES: CPT | Mod: GO

## 2024-09-29 PROCEDURE — 250N000013 HC RX MED GY IP 250 OP 250 PS 637: Performed by: PHYSICIAN ASSISTANT

## 2024-09-29 PROCEDURE — 250N000013 HC RX MED GY IP 250 OP 250 PS 637: Performed by: INTERNAL MEDICINE

## 2024-09-29 PROCEDURE — 80048 BASIC METABOLIC PNL TOTAL CA: CPT | Performed by: NURSE PRACTITIONER

## 2024-09-29 PROCEDURE — 999N000044 HC STATISTIC CVC DRESSING CHANGE

## 2024-09-29 PROCEDURE — 250N000011 HC RX IP 250 OP 636: Performed by: INTERNAL MEDICINE

## 2024-09-29 PROCEDURE — 97165 OT EVAL LOW COMPLEX 30 MIN: CPT | Mod: GO | Performed by: OCCUPATIONAL THERAPIST

## 2024-09-29 PROCEDURE — 120N000003 HC R&B IMCU UMMC

## 2024-09-29 PROCEDURE — G1010 CDSM STANSON: HCPCS | Performed by: RADIOLOGY

## 2024-09-29 PROCEDURE — 85610 PROTHROMBIN TIME: CPT

## 2024-09-29 PROCEDURE — 250N000011 HC RX IP 250 OP 636: Performed by: PHYSICIAN ASSISTANT

## 2024-09-29 PROCEDURE — 85027 COMPLETE CBC AUTOMATED: CPT

## 2024-09-29 PROCEDURE — 258N000003 HC RX IP 258 OP 636: Performed by: INTERNAL MEDICINE

## 2024-09-29 PROCEDURE — 84100 ASSAY OF PHOSPHORUS: CPT

## 2024-09-29 PROCEDURE — 71045 X-RAY EXAM CHEST 1 VIEW: CPT

## 2024-09-29 PROCEDURE — 250N000013 HC RX MED GY IP 250 OP 250 PS 637: Performed by: NURSE PRACTITIONER

## 2024-09-29 PROCEDURE — 83735 ASSAY OF MAGNESIUM: CPT

## 2024-09-29 PROCEDURE — 70498 CT ANGIOGRAPHY NECK: CPT | Mod: 26 | Performed by: RADIOLOGY

## 2024-09-29 PROCEDURE — 70496 CT ANGIOGRAPHY HEAD: CPT | Mod: 26 | Performed by: RADIOLOGY

## 2024-09-29 PROCEDURE — 97140 MANUAL THERAPY 1/> REGIONS: CPT | Mod: GO | Performed by: OCCUPATIONAL THERAPIST

## 2024-09-29 RX ORDER — LISINOPRIL 2.5 MG/1
2.5 TABLET ORAL DAILY
Status: DISCONTINUED | OUTPATIENT
Start: 2024-09-30 | End: 2024-10-01

## 2024-09-29 RX ORDER — IOPAMIDOL 755 MG/ML
67 INJECTION, SOLUTION INTRAVASCULAR ONCE
Status: COMPLETED | OUTPATIENT
Start: 2024-09-29 | End: 2024-09-29

## 2024-09-29 RX ORDER — WARFARIN SODIUM 1 MG/1
1 TABLET ORAL
Status: COMPLETED | OUTPATIENT
Start: 2024-09-29 | End: 2024-09-29

## 2024-09-29 RX ORDER — MAGNESIUM OXIDE 400 MG/1
400 TABLET ORAL EVERY 4 HOURS
Status: COMPLETED | OUTPATIENT
Start: 2024-09-29 | End: 2024-09-29

## 2024-09-29 RX ORDER — LISINOPRIL 5 MG/1
5 TABLET ORAL DAILY
Status: DISCONTINUED | OUTPATIENT
Start: 2024-09-29 | End: 2024-09-29

## 2024-09-29 RX ADMIN — THERA TABS 1 TABLET: TAB at 09:12

## 2024-09-29 RX ADMIN — IOPAMIDOL 67 ML: 755 INJECTION, SOLUTION INTRAVENOUS at 15:12

## 2024-09-29 RX ADMIN — MAGNESIUM OXIDE TAB 400 MG (241.3 MG ELEMENTAL MG) 400 MG: 400 (241.3 MG) TAB at 09:13

## 2024-09-29 RX ADMIN — METHOCARBAMOL TABLETS 750 MG: 750 TABLET, COATED ORAL at 19:22

## 2024-09-29 RX ADMIN — IRON SUCROSE 200 MG: 20 INJECTION, SOLUTION INTRAVENOUS at 16:15

## 2024-09-29 RX ADMIN — METHOCARBAMOL TABLETS 750 MG: 750 TABLET, COATED ORAL at 14:13

## 2024-09-29 RX ADMIN — ATORVASTATIN CALCIUM 80 MG: 80 TABLET, FILM COATED ORAL at 19:21

## 2024-09-29 RX ADMIN — HEPARIN, PORCINE (PF) 10 UNIT/ML INTRAVENOUS SYRINGE 5 ML: at 16:15

## 2024-09-29 RX ADMIN — LISINOPRIL 5 MG: 5 TABLET ORAL at 09:13

## 2024-09-29 RX ADMIN — PANTOPRAZOLE SODIUM 40 MG: 40 TABLET, DELAYED RELEASE ORAL at 09:12

## 2024-09-29 RX ADMIN — MAGNESIUM OXIDE TAB 400 MG (241.3 MG ELEMENTAL MG) 400 MG: 400 (241.3 MG) TAB at 11:45

## 2024-09-29 RX ADMIN — ACETAMINOPHEN 975 MG: 325 TABLET ORAL at 09:12

## 2024-09-29 RX ADMIN — AMIODARONE HYDROCHLORIDE 200 MG: 200 TABLET ORAL at 09:12

## 2024-09-29 RX ADMIN — ESCITALOPRAM OXALATE 10 MG: 10 TABLET ORAL at 09:13

## 2024-09-29 RX ADMIN — WARFARIN SODIUM 1 MG: 1 TABLET ORAL at 19:22

## 2024-09-29 RX ADMIN — ACETAMINOPHEN 975 MG: 325 TABLET ORAL at 16:11

## 2024-09-29 RX ADMIN — GABAPENTIN 100 MG: 100 CAPSULE ORAL at 22:12

## 2024-09-29 RX ADMIN — METHOCARBAMOL TABLETS 750 MG: 750 TABLET, COATED ORAL at 09:12

## 2024-09-29 RX ADMIN — Medication 10 MG: at 19:22

## 2024-09-29 RX ADMIN — ACETAMINOPHEN 975 MG: 325 TABLET ORAL at 23:34

## 2024-09-29 ASSESSMENT — ACTIVITIES OF DAILY LIVING (ADL)
ADLS_ACUITY_SCORE: 31

## 2024-09-29 NOTE — PROGRESS NOTES
I personally saw, examined and discussed this patient with doctor in training fellow; reviewing interim imaging, laboratories, consults, medications and nursie notes and agree with observations and plan outlined.   [facility]   Cardiology II Service / Advanced Heart Failure  Daily Consult Note      Patient: Duane C Johnson  MRN: [id number]  Admission Date: 8/30/2024  Hospital Day # 30    Assessment and Plan: Duane C Johnson is a 74 year old male pmhx of anterior STEMI s/p PCI to the pLAD on 10/26/23 with a VT/VF arrest the next day (10/27) with patent stents and unchanged anatomy on repeat angiogram. Placed on amiodarone and discharged 11/03/23, ICD was not indicated as this was within 48h of his MI. His EF prior to discharge was 20-30% with a large area of akinesis in the LAD, placed on apixaban due to this (Apixaban dcd on 7/5/24). Has had multiple admissions for HF exacerbation last year.  Admitted on 8/30/24 for CHF exacerbation with BNP 16k. CPX and RHC showed significant functional limitations due to heart failue. Underwent HM3 LVAD on 9/18/24.     Recommendations:  - continue lisinopril 2.5 mg daily, increase to 5 mg if MAP consistently > 90. Currently at goal  - Iron studies suggestive of RADHA. Started on IV venofer x5 days  - MELBA/DCCV (ordered). NPO MN (ordered)  - Pending ARU placement.     # Acute on chronic systolic heart failure secondary to ICM   # s/p HM3 LVAD as DT on 9/18/2024  # CAD s/p PCI  # History of STEMI  # s/p ICD 5/2024  Stage D. NYHA Class III.    Fluid status: grossly euvolemic   ACEi/ARB/ARNi: Increase lisinopril to 5 mg daily  Afterload reduction: defer   BB recent LVAD  Aldosterone antagonist deferred while other medical therapy is prioritized  SCD prophylaxis ICD  MAP: goal 65-85, current MAPs 86-94  LDH trends: 282, stable  Anticoagulation: warfarin dosing per pharmacy, INR goal 2-3, today 3.39  Antiplatelet: ASA not indicated in LVAD population, > 6 months s/p  "STEMI  Limited TTE 9/27 with LVIDd 4.0 cm, Closed AoV, no AI, normal LVAD flow velocities, moderately reduced RV, normal IVC.     # anemia  - No overt bleeds, slow downtrend since surgery. LDH downtrending.   - Iron studies suggestive of RADHA. Started on IV venofer x5 days    # History of VT/VF arrest 2023  # AFib s/p DCCV 9/2024:  - Successfully cardioverted in early September for AF. Since then EKG suggested AF on 9/21. Tele is only available to me from as early as 9/22. The patient was started on hep gtt 9/21, but unclear if was in AF before this. While the patient was on hep gtt until INR was therapeutic, it is not possible to assess rhythm prior to 9/21. Systemic AC was off on 9/18-9/20. Thus, it would be prudent to exclude LAAT with MELBA prior to DCCV  - continue amiodarone 200 mg daily   - Continue AC as above  - Recommend MELBA/DCCV tomorrow. NPO MN    # Hyponatremia hypovolemia   Na 130, ranging 128-133  - encourage PO intake  - hold diuresis at this time  - daily BMP    Time/Communication  I spent 50 minutes reviewing results, care team notes, meeting directly with the patient, coordinating care, and completing documentation on the day of service.     Patient discussed with Dr. Hernandez.      Eric Dixon, PGY-6  Cardiovascular Disease Fellow  ================================================================    Subjective/24-Hr Events:   No acute events overnight. Slept better and feeling better today. Denies any cardiopulmonary symptoms today.    ROS:  4 point ROS including respiratory, CV, GI and  (other than that noted in the HPI) is negative.     Medications: Reviewed in EPIC.     Physical Exam:   BP (!) 81/69 (BP Location: Left arm)   Pulse 94   Temp 98.2  F (36.8  C) (Oral)   Resp 18   Ht 1.676 m (5' 5.98\")   Wt 72 kg (158 lb 11.2 oz)   SpO2 96%   BMI 25.63 kg/m      GENERAL: Appears comfortable, in no distress.  HEENT: Eye symmetrical, no discharge or icterus bilaterally. Mucous membranes moist " and without lesions.  NECK: Supple,   CV: + LVAD hum  RESPIRATORY: Respirations regular, even, and unlabored. Lungs CTA throughout.    GI: Soft and non distended with normoactive bowel sounds present in all quadrants. No tenderness, rebound, guarding.   EXTREMITIES: No BLE peripheral edema. 2+ bilateral pedal pulses.   NEUROLOGIC: Alert and oriented x 3. No focal deficits.   MUSCULOSKELETAL: No joint swelling or tenderness.   SKIN: No jaundice. No rashes or lesions.     Labs:  CMP  Recent Labs   Lab 09/29/24  0458 09/28/24  0450 09/27/24  0441 09/26/24  0504 09/25/24  0733 09/25/24  0520 09/24/24  0752 09/24/24  0436   * 129* 128* 129*  --  130*  --  131*   POTASSIUM 4.3 4.8 4.3 4.4  --  4.2  --  4.3   CHLORIDE 99 98 97* 98  --  101  --  98   CO2 24 25 25 24  --  26  --  27   ANIONGAP 7 6* 6* 7  --  3*  --  6*   GLC 88 93 91 83   < > 90   < > 88   BUN 15.9 17.7 20.1 26.6*  --  27.5*  --  26.8*   CR 0.64* 0.66* 0.64* 0.63*  --  0.64*  --  0.65*   GFRESTIMATED >90 >90 >90 >90  --  >90  --  >90   PRANAV 7.9* 7.8* 7.8* 7.6*  --  7.8*  --  7.8*   MAG 1.8 1.8 1.9 2.0  --  2.0  --  1.9   PHOS 3.0 3.1 3.0 2.9  --  3.1  --  2.9   PROTTOTAL  --   --  5.3* 5.3*  --  5.3*  --  5.1*   ALBUMIN  --   --  2.4* 2.4*  --  2.3*  --  2.3*   BILITOTAL  --   --  0.3 0.3  --  0.4  --  0.4   ALKPHOS  --   --  76 79  --  68  --  69   AST  --   --  34 34  --  23  --  24   ALT  --   --  28 29  --  15  --  17    < > = values in this interval not displayed.       CBC  Recent Labs   Lab 09/29/24 0458 09/28/24  0450 09/27/24  0441 09/26/24  0504   WBC 8.9 9.0 8.1 8.5   RBC 2.61* 2.63* 2.55* 2.51*   HGB 7.9* 7.9* 7.5* 7.6*   HCT 24.6* 25.0* 24.3* 24.0*   MCV 94 95 95 96   MCH 30.3 30.0 29.4 30.3   MCHC 32.1 31.6 30.9* 31.7   RDW 16.6* 16.6* 16.7* 16.5*    250 235 218       INR  Recent Labs   Lab 09/29/24 0458 09/28/24 0450 09/27/24 0441 09/26/24  0504   INR 3.39* 3.00* 2.72* 2.97*

## 2024-09-29 NOTE — PROCEDURES
The patient's HeartMate LVAD was interrogated 9/29/2024  * Speed 5200 rpm   * Pulsatility index 6.0   * Power 3.4 Dahl   * Flow 3.2 L/minute   Fluid status: Euvolemic   Alarms were reviewed, no PI events, speed drops, flow alarms.   The driveline exit site was inspected, no evidence of infection.   All external components were inspected and showed no evidence of damage or malfunction, none replaced.   No changes to VAD settings made.    Eric Dixon, PGY-6  Cardiovascular Disease Fellow

## 2024-09-29 NOTE — CONSULTS
[facility]    Stroke Consult Note    Reason for Consult:  Transient visual change    Chief Complaint: Abnormal Labs     HPI  Duane C Johnson is a 74 year old male with PMHx of STEMI 10/2023, A-fib s/p DCCV on 9/5 and on warfarin, HTN, HLD, and s/p LVAD placement on 9/18 who has been experiencing transient visual changes over the last day prompting a neurology stroke referral. Pt reports his first episode was yesterday afternoon when he was looking out his window and noticed the upper half of his visual field on the right had a blue hue to it. He states this seemed to persist for about 10 minutes or so and then resolved. Pt describes a similar episode late this morning with analogous features that resolved after about 20 minutes. He denies any ongoing symptoms and reports his vision is at his baseline. He denies any associated loss of visual acuity, double vision, eye pain, or floaters. He also denies any associated headaches, dizziness, lightheadedness weakness, or sensory changes. He has not been very mobile but denies any changes to his balance.     Denies history of strokes or seizures. 20+ year smoking history. Denies other infectious symptoms such as fever, chills, and myalgias.      TIA Evaluation Summarized    MRI and/or Head CT Awaiting imaging   Intracranial Vasculature Awaiting imaging   Cervical Vasculature Awaiting imaging     Echocardiogram    EKG/Telemetry S/p LVAD, Known A-fib   Other Testing Not Applicable      LDL 9/4/2024: 45 mg/dL   A1C 9/18/2024: 5.4 %       ABCD2 Patients Score   Age ? 60 years 1 point 1   Blood Pressure    SBP ? 140 or DBP ?  90    1 point 0   Clinical Features    - Unilateral weakness    - Speech disturbance w/o weakness    - Other    2 points  1 point    0 points 1   Duration of symptoms    ? 60 minutes    10-59 minutes    < 10 minutes   2 points  1 point  0 points 1   Diabetes  1 point 0   Patient s ABCD2 Score (0-7) = 3  "    Impression  Mr. Aguila is a 74 year old male who is being evaluated by stroke neurology for a transient visual change. He has numerous risk factors for possible stroke including the recent LVAD procedure, A-fib, HTN, smoker, and HLD. He is currently supratherapeutic on his AC with an INR of 3.3. His neurologic exam is completely benign today. Given his description of a transient homonymous superior quadrantanopia it is reasonable to obtain imaging to rule out stroke. MRI unforunately can not be obtained due to his LVAD. We will therefore proceed with CT head non-con and CTA of the head and neck. Given he is already on anticoagulation and had a recent procedure, he would not be a candidate for thrombolysis. At this time there are no deficits to indicate an LVO requiring thrombectomy.     Recommendations   - CT Head non-con  - CTA Head and Neck    Patient Follow-up    - final recommendation pending work-up    Thank you for this consult. We will continue to follow.     The patient was discussed with Stroke Staff Dr. Nolasco.    Sudhir Barraza,   Neurology Resident  Pager:  [phone]  _____________________________________________________    Clinically Significant Risk Factors         # Hyponatremia: Lowest Na = 129 mmol/L in last 2 days, will monitor as appropriate      # Hypoalbuminemia: Lowest albumin = 2.2 g/dL at 9/23/2024  3:18 AM, will monitor as appropriate      # End stage heart failure: Ventricular assist device (VAD) present          # Overweight: Estimated body mass index is 25.63 kg/m  as calculated from the following:    Height as of this encounter: 1.676 m (5' 5.98\").    Weight as of this encounter: 72 kg (158 lb 11.2 oz).   # Moderate Malnutrition: based on nutrition assessment    # Financial/Environmental Concerns: none   # ICD device present         Past Medical History    Past Medical History:   Diagnosis Date    Benign essential hypertension     CAD (coronary artery disease)     Chronic systolic " heart failure (H)     Hypertension     ST elevation myocardial infarction (STEMI), unspecified artery (H)     Ventricular fibrillation (H)      Medications   Home Meds  Prior to Admission medications    Medication Sig Start Date End Date Taking? Authorizing Provider   acetaminophen (TYLENOL 8 HOUR ARTHRITIS PAIN) 650 MG CR tablet Take 650-1,300 mg by mouth every 8 hours as needed for pain.   Yes Unknown, Entered By History   amiodarone (PACERONE) 200 MG tablet Take 1 tablet (200 mg) by mouth daily 11/30/23  Yes Sirisha Arias APRN CNP   atorvastatin (LIPITOR) 80 MG tablet Take 1 tablet (80 mg) by mouth daily 12/11/23  Yes Jose Coles MD   bumetanide (BUMEX) 1 MG tablet Take 1 mg by mouth daily as needed (for weight increase or leg swelling).   Yes Unknown, Entered By History   cetirizine (ZYRTEC) 10 MG tablet Take 10 mg by mouth daily   Yes Unknown, Entered By History   clopidogrel (PLAVIX) 75 MG tablet Take 1 tablet (75 mg) by mouth daily 8/12/24  Yes Sirisha Arias APRN CNP   FISH OIL-VITAMIN D PO Take 1 capsule by mouth 2 times daily.   Yes Unknown, Entered By History   lisinopril (ZESTRIL) 2.5 MG tablet Take 1 tablet (2.5 mg) by mouth daily HOLD for systolic blood pressure < 90 5/20/24  Yes Ham Cruz MD   magnesium oxide (MAG-OX) 400 MG tablet Take 1 tablet (400 mg) by mouth daily 1/26/24  Yes Jose Coles MD   melatonin 5 MG tablet Take 1-2 tablets (5-10 mg) by mouth at bedtime 12/28/23  Yes Vincent Bearden DO   metoprolol succinate ER (TOPROL XL) 25 MG 24 hr tablet Take 0.5 tablets (12.5 mg) by mouth daily Hold for systolic blood pressure less than 90. 7/5/24  Yes Ham Cruz MD   multivitamin w/minerals (THERA-VIT-M) tablet Take 1 tablet by mouth 2 times daily   Yes Unknown, Entered By History   potassium chloride tray ER (KLOR-CON M20) 20 MEQ CR tablet TAKE ONE TABLET BY MOUTH ONCE DAILY 8/7/24  Yes Jose Coles MD   vitamin C (ASCORBIC ACID) 1000 MG TABS Take 1,000 mg by mouth 2  times daily.   Yes Unknown, Entered By History   empagliflozin (JARDIANCE) 10 MG TABS tablet Take 1 tablet (10 mg) by mouth daily 7/5/24   Ham Cruz MD       Scheduled Meds  Current Facility-Administered Medications   Medication Dose Route Frequency Provider Last Rate Last Admin    acetaminophen (TYLENOL) tablet 975 mg  975 mg Oral Q8H Sirisha Arias APRN CNP   975 mg at 09/29/24 0912    amiodarone (PACERONE) tablet 200 mg  200 mg Oral or Feeding Tube Daily Collette Virgen APRN CNP   200 mg at 09/29/24 0912    atorvastatin (LIPITOR) tablet 80 mg  80 mg Oral or Feeding Tube QPM Collette Virgen APRN CNP   80 mg at 09/28/24 2019    escitalopram (LEXAPRO) tablet 10 mg  10 mg Oral or Feeding Tube Daily Collette Virgen APRN CNP   10 mg at 09/29/24 0913    gabapentin (NEURONTIN) capsule 100 mg  100 mg Oral or Feeding Tube At Bedtime Cristobal Laurent MD   100 mg at 09/28/24 2134    heparin lock flush 10 unit/mL injection 5-20 mL  5-20 mL Intracatheter Q24H Bryon Way PA-C   5 mL at 09/28/24 1906    iron sucrose (VENOFER) 200 mg in sodium chloride 0.9 % 120 mL intermittent infusion  200 mg Intravenous Q24H Eric Dixon  mL/hr at 09/28/24 1757 200 mg at 09/28/24 1757    [START ON 9/30/2024] lisinopril (ZESTRIL) tablet 2.5 mg  2.5 mg Oral Daily Eric Dixon MD        melatonin tablet 10 mg  10 mg Oral QPM Mel Santoyo PA-C   10 mg at 09/28/24 2019    methocarbamol (ROBAXIN) tablet 750 mg  750 mg Oral TID Sirisha Arias APRN CNP   750 mg at 09/29/24 0912    multivitamin, therapeutic (THERA-VIT) tablet 1 tablet  1 tablet Oral or Feeding Tube Daily Collette Virgen APRN CNP   1 tablet at 09/29/24 0912    pantoprazole (PROTONIX) EC tablet 40 mg  40 mg Oral QAM AC Cristobal Laurent MD   40 mg at 09/29/24 0912    sodium chloride (PF) 0.9% PF flush 10-40 mL  10-40 mL Intracatheter Q8H Bryon Way PA-C   20 mL at 09/29/24 0500    sodium chloride (PF) 0.9% PF flush 10-40 mL  10-40 mL  Intracatheter Q8H Bryon Way PA-C   10 mL at 09/29/24 0459    sodium chloride (PF) 0.9% PF flush 3 mL  3 mL Intracatheter Q8H Julien Toribio MD   3 mL at 09/29/24 0500    Warfarin Dose Required Daily - Pharmacist Managed  1 each Oral See Admin Instructions Bryon Way PA-C           Infusion Meds  Current Facility-Administered Medications   Medication Dose Route Frequency Provider Last Rate Last Admin    Reason beta blocker order not selected   Does not apply DOES NOT GO TO Julien Hill MD           Allergies   Allergies   Allergen Reactions    Brilinta [Ticagrelor] Other (See Comments) and Difficulty breathing     Per pt and spouse, hyperventilation.    Clonazepam Other (See Comments)     Per spouse, acted like a zombie and he was shaky, could barely talk.          PHYSICAL EXAMINATION   Temp:  [97.8  F (36.6  C)-98.8  F (37.1  C)] 97.8  F (36.6  C)  Pulse:  [79-99] 82  Resp:  [16-18] 16  SpO2:  [91 %-100 %] 100 %    General Exam  General:  patient sitting bed without any acute distress    HEENT:  normocephalic/atraumatic  Cardio:   LVAD in place  Pulmonary:  no respiratory distress  Abdomen:  soft  Extremities:  no edema  Skin:  intact     Neuro Exam  Mental Status:  alert, oriented x 3, follows commands, speech clear and fluent, naming and repetition normal  Cranial Nerves:  visual fields intact, PERRL, EOMI with normal smooth pursuit, facial sensation intact and symmetric, facial movements symmetric, hearing not formally tested but intact to conversation, no dysarthria  Motor:  normal muscle tone and bulk, no abnormal movements, able to move all limbs spontaneously, strength 5/5 throughout upper and lower extremities, no pronator drift  Reflexes:  2+ and symmetric throughout, toes down-going  Sensory:  light touch sensation intact and symmetric throughout upper and lower extremities, no extinction on double simultaneous stimulation   Coordination:  normal finger-to-nose and heel-to-shin bilaterally  without dysmetria  Station/Gait:  deferred    Stroke Scales   [employer] Stroke Scale  Exam Interval: Baseline   Score    Level of consciousness: (0)   Alert, keenly responsive    LOC questions: (0)   Answers both questions correctly    LOC commands: (0)   Performs both tasks correctly    Best gaze: (0)   Normal    Visual: (0)   No visual loss    Facial palsy: (0)   Normal symmetrical movements    Motor arm (left): (0)   No drift    Motor arm (right): (0)   No drift    Motor leg (left): (0)   No drift    Motor leg (right): (0)   No drift    Limb ataxia: (0)   Absent    Sensory: (0)   Normal- no sensory loss    Best language: (0)   Normal- no aphasia    Dysarthria: (0)   Normal    Extinction and inattention: (0)   No abnormality        Total Score:  0 (13:15 9/29/2024)     Imaging  I personally reviewed all labs, imaging pending    Labs Data   CBC  Recent Labs   Lab 09/29/24 0458 09/28/24  0450 09/27/24  0441   WBC 8.9 9.0 8.1   RBC 2.61* 2.63* 2.55*   HGB 7.9* 7.9* 7.5*   HCT 24.6* 25.0* 24.3*    250 235     Basic Metabolic Panel   Recent Labs   Lab 09/29/24 0458 09/28/24  0450 09/27/24  0441   * 129* 128*   POTASSIUM 4.3 4.8 4.3   CHLORIDE 99 98 97*   CO2 24 25 25   BUN 15.9 17.7 20.1   CR 0.64* 0.66* 0.64*   GLC 88 93 91   PRANAV 7.9* 7.8* 7.8*     Liver Panel  Recent Labs   Lab 09/27/24 0441 09/26/24  0504 09/25/24  0520   PROTTOTAL 5.3* 5.3* 5.3*   ALBUMIN 2.4* 2.4* 2.3*   BILITOTAL 0.3 0.3 0.4   ALKPHOS 76 79 68   AST 34 34 23   ALT 28 29 15     INR    Recent Labs   Lab Test 09/29/24 0458 09/28/24  0450 09/27/24  0441   INR 3.39* 3.00* 2.72*

## 2024-09-29 NOTE — PLAN OF CARE
Hours of Care: 7p-7a     Neuro: A&Ox 4. Uses call light appropriately to make needs known.   Cardiac/Telemetry: Vpacing with underlying afib rhythm. VSS. HM3, numbers WNL, no alarms this shift.   Respiratory: Lungs CTA on R/A. No cough noted.   GI/: LBM: 09/29/2024. Voiding via urinal.   Diet/Appetite: Pt on mechanical soft diet. Good appetite. No BS.        Skin: No new deficits. CHG wipes, done.      LDA: R PIV, SL. R. DL PICC, SL, and LVAD driveline.  Activity: Moves Ax1 with walker and GB. On sternal precautions. Changes positions independently in bed.  Pain: C/o back pain and some rib pain. On scheduled tylenol, robaxin and gabapentin. No PRNS given.   Sleep: No sleep disturbances noted or reported.     Goal Outcome Evaluation:     Plan of Care Reviewed With: patient  Overall Patient Progress: improving  Outcome Evaluation:  Pt continues to receive LVAD teachings.

## 2024-09-29 NOTE — PROGRESS NOTES
Care Management Follow Up    Length of Stay (days): 30    Expected Discharge Date: 10/01/2024     Concerns to be Addressed: discharge planning     Patient plan of care discussed at interdisciplinary rounds: Yes    Anticipated Discharge Disposition: Acute Rehab     Anticipated Discharge Services: None  Anticipated Discharge DME: None    Patient/family educated on Medicare website which has current facility and service quality ratings: no  Education Provided on the Discharge Plan: Yes  Patient/Family in Agreement with the Plan: yes    Referrals Placed by CM/SW:  Referral to [facility]  Private pay costs discussed: Not applicable    Discussed  Partnership in Safe Discharge Planning  document with patient/family: No     Handoff Completed: No, handoff not indicated or clinically appropriate    Additional Information: Social work received message on Vocera from Mel Santoyo with Cardiothoracic Surgery this morning. Per Mel Santoyo, the patient is not medically ready to discharge today. Estimated discharge date is 10/1 or 10/2. Social work sent message to Camila gallegos  [facility] Admissions Liaison to update her regarding estimated discharge date.     Next Steps: Social work will continue to follow to support discharge planning.        MONTRELL Hayes, LGSTACIA   9/29/2024       Social Work and Care Management Department       SEARCHABLE in [facility] - search SOCIAL WORK       [place] (0800 - 1630) Saturday and Sunday     Units: 4A Vocera, 4C Vocera, & 4E Vocera        Units: 5A 4920-0155 Vocera, 5A 6424-8681 Vocera , BMT SW 1 BMT SW 2, BMT SW 3 & BMT SW 4  5C Off Service 5401 - 5416  5C Off Service [id number]     Units: 6A Vocera & 6B Vocera      Units: 6C Vocera     Units: 7A Vocera & 7B Vocera      Units: 7C Med Surg 7401 thru 7418 and 7C Med Surg 7502 thru 7521      Unit: [place] ED Vocera & [place] Obs Vocera     [facility] [place] ([phone]) Saturday and Sunday      Units: 5 Ortho Vocera, 5 Med Surg  Vocera & WB ED Vocera     Units: 6 Med Surg Vocera, 8 Med Surg Vocera, & 10 ICU Vocera      After hours Vocera [place] and After Hours Vocera [place]     Saturday & Sunday (1630 - 0000)    Mon-Fri ([phone])     [facility] Recognized Holidays  ([phone])    Units: ALL   - see above VOCERA links to units and after hours

## 2024-09-29 NOTE — CONSULTS
OPHTHALMOLOGY CONSULT NOTE      Patient: Duane C Johnson  Consulted by: [facility]  Reason for Consult: Concern for CRAO  Date of initial evaluation: 09/29/24    ASSESSMENT/PLAN:     Duane C Johnson is a 74 year old male who presents with     # Atypical amaurosis, right eye   Patient with transient painless contrast sensitivity in the superior aspect of right eye vision on repeat episodes with subsequent return to baseline. VA 20/30 +2 right eye and 20/30 -2 left eye. No APD, IOP nml, color plates full, and no gross visual field cut. His exam is reassuring against CRAO, as there is well-preserved vision, no retinal whitening, Hollenhorst plaques, or boxcarring. Another consideration would be NAION given superior field vision changes, but there is no APD or disc edema on exam. GCA ROS negative also reassuring. He does have some perhaps mildly dilated retinal veins, but lacks other signs of CRVO such as diffuse intraretinal hemorrhages. Most likely diagnosis in this case is atypical amaurosis in the setting of extensive cardiovascular disease risk factors. He also may have an impending CRVO in the right eye.     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   - Slit lamp photos at ophthalmology follow up   - Annual eye exam for monitoring     It is our pleasure to participate in  "this patient's care and treatment. Please contact us with any further questions or concerns.    Patient discussed with senior resident Gail Smith MD.    Giancarlo Griggs MD  Resident Physician, PGY-2  Department of Ophthalmology      HISTORY OF PRESENTING ILLNESS:     Duane C Johnson is a 74 year old male who presents with PMH of HFrEF 2/2 ICM with LVEF 20-30%, atrial fibrillation, VT/VF arrest with ICD in place, coronary stent to LAD, and ambulatory shock who is s/p implantation of HeartMate 3 LVAD on 9/18/2024 with Dr. Mulvihill and return to OR on POD 0 for mediastinal re-exploration, mediastinal clot evacuation, and chest washout. Ophthalmology is consulted by primary team due to concern for a CRAO.     Patient states that around 8-10am yesterday he had an episode where he was looking outside at the saira through his room window and noticed a contrast change in his right eye. The saira appeared \"less blue\" and \"darker.\" The episode lasted 15 minutes and was not associated with vision loss, flashes of light, floaters, or pain. His vision return to baseline after. He had another episode in the right eye lasting about 15 minutes this morning around the same time, with analogous features. It seems these episodes likely were in the superior aspect of his right eye visual field. Patient notes they were in the right eye, not his right visual field. These episodes both occurred while at rest in his room and were not associated with dizziness or lightheadedness.     He states at present his vision is baseline. Patient denies recent unexplained fatigue, fevers, weight loss, joint pain or stiffness, new headaches, scalp tenderness, jaw claudication (tiredness with talking or chewing), or double vision. He denies pain with eye movement. He has no history of eye surgeries or prior ophthalmic diagnoses.     Review of imaging shows that patient had a bilateral carotid ultrasound on 9/4/24 which did not show stenosis. He has a CT head " from 9/3 which did not show acute intracranial pathology, but showed an asymmetrically increased right frontal extra-axial space compared to the left, possibly secondary to asymmetric volume loss or hygroma.     Review of systems were otherwise negative except for that which has been stated above.      OCULAR/MEDICAL/SURGICAL HISTORIES:     Past Ocular History:  Last eye exam: States he follows with an eye doctor,  but does not recall last exam  Prior eye surgery/laser: None  Contact lens wear: None  Glasses: Readers (states +3D lenses)  Eyedrops: None    Family History:  No blinding eye disease    Social History:  Noncontributory    Past Medical History:   Diagnosis Date    Benign essential hypertension     CAD (coronary artery disease)     Chronic systolic heart failure (H)     Hypertension     ST elevation myocardial infarction (STEMI), unspecified artery (H)     Ventricular fibrillation (H)        Past Surgical History:   Procedure Laterality Date    ANESTHESIA CARDIOVERSION N/A 9/5/2024    Procedure: Anesthesia cardioversion;  Surgeon: GENERIC ANESTHESIA PROVIDER;  Location: [facility] OR    COLONOSCOPY N/A 3/23/2023    Procedure: COLONOSCOPY, FLEXIBLE, WITH LESION REMOVAL USING SNARE;  Surgeon: Jose Faustin MD;  Location: [facility]    CV CENTRAL VENOUS CATHETER PLACEMENT N/A 10/26/2023    Procedure: Central Venous Catheter Placement;  Surgeon: Rob Lyles MD;  Location:  HEART CARDIAC CATH LAB    CV CORONARY ANGIOGRAM N/A 10/27/2023    Procedure: Coronary Angiogram;  Surgeon: Rob Lyles MD;  Location: [facility] CARDIAC CATH LAB    CV CORONARY ANGIOGRAM N/A 10/26/2023    Procedure: Coronary Angiogram;  Surgeon: Rob Lyles MD;  Location: [facility] CARDIAC CATH LAB    CV LEFT HEART CATH N/A 10/26/2023    Procedure: Left Heart Catheterization;  Surgeon: Rob Lyles MD;  Location: [facility] CARDIAC CATH LAB    CV PCI N/A 10/27/2023    Procedure: Percutaneous Coronary  Intervention;  Surgeon: Rob Lyles MD;  Location:  HEART CARDIAC CATH LAB    CV PCI STENT DRUG ELUTING N/A 10/26/2023    Procedure: Percutaneous Coronary Intervention Stent;  Surgeon: Rob Lyles MD;  Location:  HEART CARDIAC CATH LAB    CV RIGHT HEART CATH MEASUREMENTS RECORDED N/A 5/6/2024    Procedure: Heart Cath Right Heart Cath;  Surgeon: Rob Lyles MD;  Location:  HEART CARDIAC CATH LAB    CV RIGHT HEART CATH MEASUREMENTS RECORDED N/A 9/3/2024    Procedure: Right Heart Catheterization;  Surgeon: Aaron Mesa MD;  Location:  HEART CARDIAC CATH LAB    EP ICD INSERT SINGLE N/A 5/8/2024    Procedure: Implantable Cardioverter Defibrillator Device & Lead Implant Dual;  Surgeon: Guero Black MD;  Location:  HEART CARDIAC CATH LAB    INJECTION, HYDROGEL SPACER N/A 4/22/2024    Procedure: INJECTION, HYDROGEL SPACER AND FIDUCIAL MARKER PLACEMENT;  Surgeon: Stanislaw Barros MD;  Location: [facility]    INSERT VENTRICULAR ASSIST DEVICE LEFT (HEARTMATE II) N/A 9/18/2024    Procedure: Median Sternotomy, INSERTION of LEFT VENTRICULAR ASSIST DEVICE (HEARTMATE III), cardiopulmonary bypass, transesophageal echocardiogram performed by anesthesia.;  Surgeon: Mulvihill, Michael, MD;  Location:  OR    IRRIGATION AND DEBRIDEMENT CHEST WASHOUT, COMBINED Right 9/18/2024    Procedure: Exploration of chest, chest washout;  Surgeon: Mulvihill, Michael, MD;  Location:  OR       EXAMINATION:     Base Eye Exam       Visual Acuity (Snellen - Linear)         Right Left    Near sc 20/30 + 2 ph 20/30 -2              Tonometry (Tonopen, 2:20 PM)         Right Left    Pressure 21 19              Pupils         Dark Light Shape React APD    Right 3 2 Round Brisk None    Left 3 2 Round Brisk None              Visual Fields         Left Right     Full Full              Extraocular Movement         Right Left     Full Full              Neuro/Psych       Oriented x3: Yes               Dilation       Both eyes: 1.0% Mydriacyl, 2.5% Alfonso Synephrine @ 2:20 PM                  Additional Tests       Color         Right Left    Ishihara 11/11 11/11                  Slit Lamp and Fundus Exam       External Exam         Right Left    External Normal Normal              Portable Slit Lamp Exam         Right Left    Lids/Lashes Normal Normal    Conjunctiva/Sclera White and quiet, darkly pigmented lesions concerning for MAGUI temporally White and quiet    Cornea Tr PEE inferiorly Tr PEE inferiorly    Anterior Chamber Deep and quiet Deep and quiet    Iris Round and reactive -> pharmacologically dilated Round and reactive -> pharmacologically dilated    Lens 1+ NS 1+ NS              Fundus Exam         Right Left    Disc Pink, sharp, flat Pink, sharp, flat    C/D Ratio 0.3 0.3    Macula Blunted FLR, pigmentary changes temporally, no whitening Blunted FLR, pigmentary changes temporally    Vessels No Hollenhorst plaques or boxcarring, AV nicking superior to optic disc, ? mild dilation of retinal veins sup > inf Normal, no sheathing    Periphery Peripheral retinal degeneration Peripheral retinal degeneration                    Labs/Studies/Imaging Performed:  None

## 2024-09-29 NOTE — PLAN OF CARE
"Goal Outcome Evaluation:      Plan of Care Reviewed With: patient    Overall Patient Progress: improving     I: Monitored vitals and assessed pt status.   Changes during this shift: Neuro and ophthalmology consulted, head CT with contrast  Running:   PRN Med:      A:   Neuro: Ax4 Denies Headache, dizziness,  Lightheadedness, numbness and tingling. calls appropriately. Had a brief moment of \"seeing stars\" and left arm pain. CVTS and Cards 2 assessed, neuro and ophthalmology consulted, head CT was given. Symptoms resolved after a few moments.   Cardiac: HM3, numbers WNL, no alarms this shift. Afib, HR 80s Afebrile. During \"seeing stars\" event, maps were in low 60s, but have since been stable.  Denies chest pain.   Respiratory: sating >95 ON RA. denies SOB. LS clear bilaterally.   Diet/appetite: mechanical/soft Diet. Good appetite.   GI/:  last BM 9/28. Denies abdominal pain. Good urine output.   Activity:  Moves Ax1 with walker and GB  Pain: Acknowledges back pain per baseline, minor headache, and some \"rib pain\" managed with scheduled tylenol.   Skin: 1 CT to suction, LVAD driveline site WNL, Rt PIV SL, R DL PICC saline locked.  Plan: Awaiting bed in ARU.           "

## 2024-09-29 NOTE — PROGRESS NOTES
Cardiovascular Surgery Progress Note  09/29/2024         Assessment and Plan:     Duane C Johnson is a 74 year old male with a PMH of  HFrEF 2/2 ICM with LVEF 20-30%, atrial fibrillation, 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 Due to worsening functional limitations due to heart failure, patient was worked up for VAD while inpatient. Patient is now s/p implantation of HeartMate 3 LVAD on 9/18/2024 with Dr. Mulvihill. Returned to OR on POD 0 for mediastinal re-exploration, mediastinal clot evacuation, and chest washout.     Cardiovascular:   HFrEF 2/2 ICM - s/p HM3 LVAD implant 9/18/24  Cardiogenic shock, improved  Hx afib s/p DCCV (9/5/24)  Hx VT/VF arrest with ICD in place (5/8/24)  Hx STEMI, CAD s/p PCI to LAD (2023)  HTN  HLD  Continued rate controlled afib since at least 9/21 (per EKG)   Preop echo showed LV EF 10-15%  Postop echo 9/27 showed LVAD cannula seen in the expected anatomic position in the LV apex, normal flow velocities, moderately reduced RV function, normal IVC   - ASA not indicated  - Atorvastatin 80 mg daily  - PTA amiodarone 200 mg daily  - lisinopril 2.5 mg daily started 9/26 for MAPs in the 90s  - Cards 2 following for heart failure optimization, appreciate assistance  - plan for MELBA/DCCV 9/30 per cardiology (NPO at midnight)    Chest tubes: mediastinal tubes removed 9/24, R pleural removed 9/28. Plan to keep pericardial drain until a bit drier per surgeon.     Pulmonary:  - Extubated POD 2; now saturating well on RA   - Supplemental O2 PRN to keep sats > 92%  - Pulm toilet, IS, OOB/activity and deep breathing    Neurology / MSK:  Acute post-operative pain  - Multimodal analgesia with acetaminophen, oxycodone PRN, Robaxin PRN, HS gabapentin  Anxiety  - PTA Lexapro resumed  S/p sternotomy   - PT/OT/CR consults   - Sternal precautions  Insomnia  - melatonin increased from 5 mg to 10 mg  HS 9/28  Transient right eye visual deficit   - 9/29 patient reported transient discoloration in the top half of the visual field in his right eye, no additional neurologic deficits noted  - neurology consulted - recommend CT head and CTA head/neck to rule out stroke (patient can't get MRI due to LVAD); not a TPA candidate   - CT head and CTA head/neck pending  - ophthalmology consulted - no evidence of CRAO. Most likely diagnosis is atypical amaurosis vs possible impending CRVO, agree with CT head/CTA head neck  -  Patient will need non-urgent follow up in ophthalmology clinic for repeat dilated exam, OCT macula each eye, and IVFA with transit right eye      / Renal / FE:  Hyponatremia  Hx prostate cancer s/p Leuprolide/radiation therapy   - Baseline creatinine 0.9-1  - Diuresis: continue to hold diuresis as patient felt to be intravascularly euvolemic  - lymphedema wraps ordered 9/28  - Replace electrolytes prn per protocol  - Strict I/O, daily standing weights per protocol  - Completed Leuprolide on 6/11/24 and radiation therapy on 6/27/24. Last PSA was <0.01 on 7/29/24   - Daily BMP    GI / Nutrition:   - Mechanical/dental soft diet, continue bowel regimen    Endocrine:  Stress-induced hyperglycemia, resolved  Hgb A1c 5.4%    - Initially managed on insulin drip postop, transitioned to sliding scale; discontinued POCT and sliding scale given maintenance of euglycemia without need for supplemental insulin    Infectious Disease:  Stress-induced leukocytosis, resolved  - WBC WNL, afebrile, no signs or symptoms of infection  - Completed perioperative antibiotics    Hematology:   Acute blood loss anemia  Thrombocytopenia, resolved  Warfarin AC for LVAD  Hgb stable; Plt WNL, no signs or symptoms of active bleeding    Anticoagulation:   - Coumadin for LVAD, INR goal 2-3. Most recent INR mildly supra therapeutic at 3.39  - LIH discontinued    Prophylaxis:   - Stress ulcer prophylaxis: Pantoprazole 40 mg daily for 30  "days post-operatively  - DVT prophylaxis: warfarin AC, SCD, OOB/activity    Disposition:   - Transferred to  on 9/23  - Therapies recommending discharge to ARU vs TCU  - Medically Ready for Discharge: Anticipated in 2-4 Days  Barriers to discharge include chest tubes, neurologic work up.     Discussed with Dr. Mulvihill.     Rashard Santoyo PA-C  Cardiothoracic Surgery    3:17 PM  September 29, 2024  pager [phone]          Interval History:     No acute events overnight. Slept better overnight.   This afternoon patient reported a blue discoloration in the top half of his visual field in his right eye which lasted for a few minutes then completely resolved. He had a similar episode earlier this morning between 8-9 AM. He denies associated neurologic deficits. Denies black spots in vision or missing pieces from visual field, flashes of light, eye pain or blurred/cloudy vision.  Neurology and ophthalmology were consulted as above.  States pain is generally well controlled.   Tolerating diet with good appetite, + BM. No nausea or vomiting.  Denies dyspnea but does endorse fatigue with activity.  Denies chest pain, dizziness/lightheadedness, and sternal popping/clicking/snapping.         Physical Exam:   Blood pressure (!) 81/69, pulse 82, temperature 97.8  F (36.6  C), resp. rate 16, height 1.676 m (5' 5.98\"), weight 72 kg (158 lb 11.2 oz), SpO2 100%.  Vitals:    09/27/24 0500 09/28/24 0633 09/29/24 0500   Weight: 73 kg (160 lb 15 oz) 71.7 kg (158 lb 1.6 oz) 72 kg (158 lb 11.2 oz)      Admission weight: 66 kg  24 hr Fluid status:  +167 mL    Gen: NAD, sitting up in chair, pleasant, calm, conversant, cooperative on exam  Neuro: A&Ox4, CN II-XII intact, 5/5 strength and normal sensation of the bilateral upper and lower extremities, cerebellar function intact to finger-nose test   CV: RRR on monitor, LVAD hum  Pulm: unlabored breathing on RA, CTAB  Abd: nondistended, non-tender  Ext: 1+ peripheral edema bilaterally, no gross " "deformities  Incision: clean, dry, intact, no erythema or induration, sternum stable  Tubes/drain sites: dressing clean and dry, serous output, no air leak  Lines: driveline dressing clean and dry     Heartmate 3 LEFT VS  Flow (Lpm): 4 Lpm  Pulse Index (PI): 2.6 PI  Speed (rpm): 5200 rpm  Power (jackson): 3.3 jackson  Current Hct settin    Clinically Significant Risk Factors         # Hyponatremia: Lowest Na = 129 mmol/L in last 2 days, will monitor as appropriate      # Hypoalbuminemia: Lowest albumin = 2.2 g/dL at 2024  3:18 AM, will monitor as appropriate        # End stage heart failure: Ventricular assist device (VAD) present          # Overweight: Estimated body mass index is 25.63 kg/m  as calculated from the following:    Height as of this encounter: 1.676 m (5' 5.98\").    Weight as of this encounter: 72 kg (158 lb 11.2 oz).   # Moderate Malnutrition: based on nutrition assessment      # Financial/Environmental Concerns: none   # ICD device present            Data:    Imaging:  reviewed recent imaging    Recent Results (from the past 24 hour(s))   XR Chest Port 1 View    Narrative    Exam: XR CHEST PORT 1 VIEW, 2024 9:38 AM    Indication: chest tubes s/p LVAD insertion    Comparison: 2024    Findings:   Right hepatectomy PICC tip at the high right atrium. Stable  mediastinal drain, LVAD, and left chest wall ICD. Left basilar chest  tube removed. Stable enlarged cardiac silhouette. Coronary artery  stent. Stable small moderate bilateral pleural effusions. No  appreciable pneumothorax. Stable left greater than right streaky  perihilar and bibasilar opacities.      Impression    Impression:   1. Support devices, as above.  2. Stable bilateral pleural effusions and left greater than right  basilar predominant atelectasis versus consolidation.    PIERCE FERNANDEZ DO         SYSTEM ID:  [id number]       Labs:  BMP  Recent Labs   Lab 24  0458 24  0450 24  0441 24  0504   * " 129* 128* 129*   POTASSIUM 4.3 4.8 4.3 4.4   CHLORIDE 99 98 97* 98   PRANAV 7.9* 7.8* 7.8* 7.6*   CO2 24 25 25 24   BUN 15.9 17.7 20.1 26.6*   CR 0.64* 0.66* 0.64* 0.63*   GLC 88 93 91 83     CBC  Recent Labs   Lab 09/29/24  0458 09/28/24  0450 09/27/24  0441 09/26/24  0504   WBC 8.9 9.0 8.1 8.5   RBC 2.61* 2.63* 2.55* 2.51*   HGB 7.9* 7.9* 7.5* 7.6*   HCT 24.6* 25.0* 24.3* 24.0*   MCV 94 95 95 96   MCH 30.3 30.0 29.4 30.3   MCHC 32.1 31.6 30.9* 31.7   RDW 16.6* 16.6* 16.7* 16.5*    250 235 218     INR  Recent Labs   Lab 09/29/24  0458 09/28/24  0450 09/27/24  0441 09/26/24  0504   INR 3.39* 3.00* 2.72* 2.97*      Hepatic Panel  Recent Labs   Lab 09/27/24 0441 09/26/24  0504 09/25/24  0520 09/24/24  0436   AST 34 34 23 24   ALT 28 29 15 17   ALKPHOS 76 79 68 69   BILITOTAL 0.3 0.3 0.4 0.4   ALBUMIN 2.4* 2.4* 2.3* 2.3*     GLUCOSE:   Recent Labs   Lab 09/29/24  0458 09/28/24  0450 09/27/24  0441 09/26/24  0504 09/25/24  1115 09/25/24  0733   GLC 88 93 91 83 115* 93

## 2024-09-30 ENCOUNTER — APPOINTMENT (OUTPATIENT)
Dept: CARDIOLOGY | Facility: CLINIC | Age: 74
DRG: 001 | End: 2024-09-30
Attending: INTERNAL MEDICINE
Payer: MEDICARE

## 2024-09-30 ENCOUNTER — ANESTHESIA EVENT (OUTPATIENT)
Dept: SURGERY | Facility: CLINIC | Age: 74
DRG: 001 | End: 2024-09-30
Payer: MEDICARE

## 2024-09-30 ENCOUNTER — APPOINTMENT (OUTPATIENT)
Dept: GENERAL RADIOLOGY | Facility: CLINIC | Age: 74
DRG: 001 | End: 2024-09-30
Attending: PHYSICIAN ASSISTANT
Payer: MEDICARE

## 2024-09-30 ENCOUNTER — APPOINTMENT (OUTPATIENT)
Dept: ULTRASOUND IMAGING | Facility: CLINIC | Age: 74
DRG: 001 | End: 2024-09-30
Attending: PHYSICIAN ASSISTANT
Payer: MEDICARE

## 2024-09-30 ENCOUNTER — APPOINTMENT (OUTPATIENT)
Dept: OCCUPATIONAL THERAPY | Facility: CLINIC | Age: 74
DRG: 001 | End: 2024-09-30
Attending: PHYSICIAN ASSISTANT
Payer: MEDICARE

## 2024-09-30 ENCOUNTER — ANESTHESIA (OUTPATIENT)
Dept: SURGERY | Facility: CLINIC | Age: 74
DRG: 001 | End: 2024-09-30
Payer: MEDICARE

## 2024-09-30 ENCOUNTER — APPOINTMENT (OUTPATIENT)
Dept: GENERAL RADIOLOGY | Facility: CLINIC | Age: 74
DRG: 001 | End: 2024-09-30
Attending: INTERNAL MEDICINE
Payer: MEDICARE

## 2024-09-30 ENCOUNTER — TELEPHONE (OUTPATIENT)
Dept: OPHTHALMOLOGY | Facility: CLINIC | Age: 74
End: 2024-09-30
Payer: MEDICARE

## 2024-09-30 ENCOUNTER — APPOINTMENT (OUTPATIENT)
Dept: PHYSICAL THERAPY | Facility: CLINIC | Age: 74
DRG: 001 | End: 2024-09-30
Attending: INTERNAL MEDICINE
Payer: MEDICARE

## 2024-09-30 LAB
ANION GAP SERPL CALCULATED.3IONS-SCNC: 9 MMOL/L (ref 7–15)
BUN SERPL-MCNC: 17.1 MG/DL (ref 8–23)
CALCIUM SERPL-MCNC: 7.9 MG/DL (ref 8.8–10.4)
CHLORIDE SERPL-SCNC: 98 MMOL/L (ref 98–107)
CREAT SERPL-MCNC: 0.63 MG/DL (ref 0.67–1.17)
EGFRCR SERPLBLD CKD-EPI 2021: >90 ML/MIN/1.73M2
ERYTHROCYTE [DISTWIDTH] IN BLOOD BY AUTOMATED COUNT: 16.7 % (ref 10–15)
GLUCOSE SERPL-MCNC: 85 MG/DL (ref 70–99)
HCO3 SERPL-SCNC: 24 MMOL/L (ref 22–29)
HCT VFR BLD AUTO: 25.4 % (ref 40–53)
HGB BLD-MCNC: 7.9 G/DL (ref 13.3–17.7)
INR PPP: 3.08 (ref 0.85–1.15)
LVEF ECHO: NORMAL
MAGNESIUM SERPL-MCNC: 1.8 MG/DL (ref 1.7–2.3)
MCH RBC QN AUTO: 30 PG (ref 26.5–33)
MCHC RBC AUTO-ENTMCNC: 31.1 G/DL (ref 31.5–36.5)
MCV RBC AUTO: 97 FL (ref 78–100)
PHOSPHATE SERPL-MCNC: 3 MG/DL (ref 2.5–4.5)
PLATELET # BLD AUTO: 259 10E3/UL (ref 150–450)
POTASSIUM SERPL-SCNC: 4.3 MMOL/L (ref 3.4–5.3)
RBC # BLD AUTO: 2.63 10E6/UL (ref 4.4–5.9)
SODIUM SERPL-SCNC: 131 MMOL/L (ref 135–145)
WBC # BLD AUTO: 9.3 10E3/UL (ref 4–11)

## 2024-09-30 PROCEDURE — 85027 COMPLETE CBC AUTOMATED: CPT

## 2024-09-30 PROCEDURE — 274N000009 HC DEVICE HM UNIVERSAL BATTERY CHARGER, INITIAL ONLY, EACH

## 2024-09-30 PROCEDURE — 92960 CARDIOVERSION ELECTRIC EXT: CPT | Performed by: INTERNAL MEDICINE

## 2024-09-30 PROCEDURE — 250N000013 HC RX MED GY IP 250 OP 250 PS 637: Performed by: PHYSICIAN ASSISTANT

## 2024-09-30 PROCEDURE — 83735 ASSAY OF MAGNESIUM: CPT

## 2024-09-30 PROCEDURE — 120N000003 HC R&B IMCU UMMC

## 2024-09-30 PROCEDURE — 97140 MANUAL THERAPY 1/> REGIONS: CPT | Mod: GO

## 2024-09-30 PROCEDURE — 93312 ECHO TRANSESOPHAGEAL: CPT | Mod: 26 | Performed by: INTERNAL MEDICINE

## 2024-09-30 PROCEDURE — 71045 X-RAY EXAM CHEST 1 VIEW: CPT | Mod: 26 | Performed by: STUDENT IN AN ORGANIZED HEALTH CARE EDUCATION/TRAINING PROGRAM

## 2024-09-30 PROCEDURE — 84100 ASSAY OF PHOSPHORUS: CPT

## 2024-09-30 PROCEDURE — 250N000013 HC RX MED GY IP 250 OP 250 PS 637: Performed by: NURSE PRACTITIONER

## 2024-09-30 PROCEDURE — 250N000013 HC RX MED GY IP 250 OP 250 PS 637: Performed by: INTERNAL MEDICINE

## 2024-09-30 PROCEDURE — 80048 BASIC METABOLIC PNL TOTAL CA: CPT | Performed by: NURSE PRACTITIONER

## 2024-09-30 PROCEDURE — 93005 ELECTROCARDIOGRAM TRACING: CPT

## 2024-09-30 PROCEDURE — 5A2204Z RESTORATION OF CARDIAC RHYTHM, SINGLE: ICD-10-PCS | Performed by: STUDENT IN AN ORGANIZED HEALTH CARE EDUCATION/TRAINING PROGRAM

## 2024-09-30 PROCEDURE — 999N000065 XR CHEST PORT 1 VIEW

## 2024-09-30 PROCEDURE — 97530 THERAPEUTIC ACTIVITIES: CPT | Mod: GP

## 2024-09-30 PROCEDURE — 99221 1ST HOSP IP/OBS SF/LOW 40: CPT | Mod: 24 | Performed by: STUDENT IN AN ORGANIZED HEALTH CARE EDUCATION/TRAINING PROGRAM

## 2024-09-30 PROCEDURE — 93971 EXTREMITY STUDY: CPT | Mod: 26 | Performed by: RADIOLOGY

## 2024-09-30 PROCEDURE — 93320 DOPPLER ECHO COMPLETE: CPT | Mod: 26 | Performed by: INTERNAL MEDICINE

## 2024-09-30 PROCEDURE — 85610 PROTHROMBIN TIME: CPT

## 2024-09-30 PROCEDURE — 250N000011 HC RX IP 250 OP 636: Performed by: INTERNAL MEDICINE

## 2024-09-30 PROCEDURE — 71045 X-RAY EXAM CHEST 1 VIEW: CPT

## 2024-09-30 PROCEDURE — 99232 SBSQ HOSP IP/OBS MODERATE 35: CPT | Mod: 25 | Performed by: PHYSICIAN ASSISTANT

## 2024-09-30 PROCEDURE — 370N000017 HC ANESTHESIA TECHNICAL FEE, PER MIN

## 2024-09-30 PROCEDURE — 93325 DOPPLER ECHO COLOR FLOW MAPG: CPT | Mod: 26 | Performed by: INTERNAL MEDICINE

## 2024-09-30 PROCEDURE — 250N000009 HC RX 250: Performed by: INTERNAL MEDICINE

## 2024-09-30 PROCEDURE — 250N000009 HC RX 250

## 2024-09-30 PROCEDURE — 258N000003 HC RX IP 258 OP 636: Performed by: INTERNAL MEDICINE

## 2024-09-30 PROCEDURE — 97116 GAIT TRAINING THERAPY: CPT | Mod: GP

## 2024-09-30 PROCEDURE — 92960 CARDIOVERSION ELECTRIC EXT: CPT

## 2024-09-30 PROCEDURE — 93325 DOPPLER ECHO COLOR FLOW MAPG: CPT

## 2024-09-30 PROCEDURE — 250N000011 HC RX IP 250 OP 636: Performed by: STUDENT IN AN ORGANIZED HEALTH CARE EDUCATION/TRAINING PROGRAM

## 2024-09-30 PROCEDURE — 99231 SBSQ HOSP IP/OBS SF/LOW 25: CPT | Performed by: STUDENT IN AN ORGANIZED HEALTH CARE EDUCATION/TRAINING PROGRAM

## 2024-09-30 PROCEDURE — 99152 MOD SED SAME PHYS/QHP 5/>YRS: CPT | Performed by: INTERNAL MEDICINE

## 2024-09-30 PROCEDURE — 93971 EXTREMITY STUDY: CPT | Mod: LT

## 2024-09-30 PROCEDURE — 93750 INTERROGATION VAD IN PERSON: CPT | Performed by: PHYSICIAN ASSISTANT

## 2024-09-30 RX ORDER — MAGNESIUM OXIDE 400 MG/1
400 TABLET ORAL EVERY 4 HOURS
Status: COMPLETED | OUTPATIENT
Start: 2024-09-30 | End: 2024-09-30

## 2024-09-30 RX ORDER — SPIRONOLACTONE 25 MG
12.5 TABLET ORAL DAILY
Status: DISCONTINUED | OUTPATIENT
Start: 2024-10-01 | End: 2024-10-01

## 2024-09-30 RX ORDER — LIDOCAINE HYDROCHLORIDE 20 MG/ML
15 SOLUTION OROPHARYNGEAL ONCE
Status: COMPLETED | OUTPATIENT
Start: 2024-09-30 | End: 2024-09-30

## 2024-09-30 RX ORDER — NALOXONE HYDROCHLORIDE 0.4 MG/ML
0.2 INJECTION, SOLUTION INTRAMUSCULAR; INTRAVENOUS; SUBCUTANEOUS
Status: DISCONTINUED | OUTPATIENT
Start: 2024-09-30 | End: 2024-10-01

## 2024-09-30 RX ORDER — ACETAMINOPHEN 325 MG/1
650 TABLET ORAL EVERY 4 HOURS PRN
Status: ACTIVE | OUTPATIENT
Start: 2024-09-30 | End: 2024-10-02

## 2024-09-30 RX ORDER — MAGNESIUM HYDROXIDE/ALUMINUM HYDROXICE/SIMETHICONE 120; 1200; 1200 MG/30ML; MG/30ML; MG/30ML
30 SUSPENSION ORAL EVERY 8 HOURS PRN
Status: ACTIVE | OUTPATIENT
Start: 2024-09-30 | End: 2024-10-02

## 2024-09-30 RX ORDER — NALOXONE HYDROCHLORIDE 0.4 MG/ML
0.4 INJECTION, SOLUTION INTRAMUSCULAR; INTRAVENOUS; SUBCUTANEOUS
Status: DISCONTINUED | OUTPATIENT
Start: 2024-09-30 | End: 2024-10-01

## 2024-09-30 RX ORDER — BENZOCAINE/MENTHOL 6 MG-10 MG
1 LOZENGE MUCOUS MEMBRANE 3 TIMES DAILY PRN
Status: ACTIVE | OUTPATIENT
Start: 2024-09-30 | End: 2024-10-02

## 2024-09-30 RX ORDER — SODIUM CHLORIDE 9 MG/ML
30 INJECTION, SOLUTION INTRAVENOUS CONTINUOUS
Status: DISCONTINUED | OUTPATIENT
Start: 2024-09-30 | End: 2024-10-01

## 2024-09-30 RX ORDER — WARFARIN SODIUM 2 MG/1
2 TABLET ORAL
Status: COMPLETED | OUTPATIENT
Start: 2024-09-30 | End: 2024-09-30

## 2024-09-30 RX ORDER — SODIUM CHLORIDE 9 MG/ML
1000 INJECTION, SOLUTION INTRAVENOUS CONTINUOUS
Status: DISCONTINUED | OUTPATIENT
Start: 2024-09-30 | End: 2024-10-01

## 2024-09-30 RX ORDER — FLUMAZENIL 0.1 MG/ML
0.2 INJECTION, SOLUTION INTRAVENOUS
Status: DISCONTINUED | OUTPATIENT
Start: 2024-09-30 | End: 2024-10-01

## 2024-09-30 RX ORDER — POTASSIUM CHLORIDE 750 MG/1
40 TABLET, EXTENDED RELEASE ORAL
Status: DISCONTINUED | OUTPATIENT
Start: 2024-09-30 | End: 2024-10-01

## 2024-09-30 RX ORDER — ACYCLOVIR 200 MG/1
9.5 CAPSULE ORAL
Status: DISCONTINUED | OUTPATIENT
Start: 2024-09-30 | End: 2024-10-01

## 2024-09-30 RX ORDER — METHOHEXITAL IN WATER/PF 100MG/10ML
SYRINGE (ML) INTRAVENOUS PRN
Status: DISCONTINUED | OUTPATIENT
Start: 2024-09-30 | End: 2024-09-30

## 2024-09-30 RX ORDER — BUMETANIDE 1 MG/1
1 TABLET ORAL DAILY
Status: DISCONTINUED | OUTPATIENT
Start: 2024-09-30 | End: 2024-10-02

## 2024-09-30 RX ORDER — LIDOCAINE 40 MG/G
CREAM TOPICAL
Status: DISCONTINUED | OUTPATIENT
Start: 2024-09-30 | End: 2024-10-01

## 2024-09-30 RX ORDER — POTASSIUM CHLORIDE 750 MG/1
20 TABLET, EXTENDED RELEASE ORAL
Status: DISCONTINUED | OUTPATIENT
Start: 2024-09-30 | End: 2024-10-01

## 2024-09-30 RX ORDER — MAGNESIUM SULFATE HEPTAHYDRATE 40 MG/ML
2 INJECTION, SOLUTION INTRAVENOUS
Status: DISCONTINUED | OUTPATIENT
Start: 2024-09-30 | End: 2024-10-01

## 2024-09-30 RX ORDER — FENTANYL CITRATE 50 UG/ML
25 INJECTION, SOLUTION INTRAMUSCULAR; INTRAVENOUS
Status: DISCONTINUED | OUTPATIENT
Start: 2024-09-30 | End: 2024-10-01

## 2024-09-30 RX ADMIN — MIDAZOLAM 0.5 MG: 1 INJECTION INTRAMUSCULAR; INTRAVENOUS at 09:39

## 2024-09-30 RX ADMIN — ATORVASTATIN CALCIUM 80 MG: 80 TABLET, FILM COATED ORAL at 20:34

## 2024-09-30 RX ADMIN — Medication 30 MG: at 10:59

## 2024-09-30 RX ADMIN — WARFARIN SODIUM 2 MG: 2 TABLET ORAL at 17:18

## 2024-09-30 RX ADMIN — BUMETANIDE 1 MG: 1 TABLET ORAL at 13:53

## 2024-09-30 RX ADMIN — FENTANYL CITRATE 50 MCG: 50 INJECTION, SOLUTION INTRAMUSCULAR; INTRAVENOUS at 09:33

## 2024-09-30 RX ADMIN — MAGNESIUM OXIDE TAB 400 MG (241.3 MG ELEMENTAL MG) 400 MG: 400 (241.3 MG) TAB at 20:34

## 2024-09-30 RX ADMIN — MIDAZOLAM 0.5 MG: 1 INJECTION INTRAMUSCULAR; INTRAVENOUS at 09:36

## 2024-09-30 RX ADMIN — TOPICAL ANESTHETIC 0.5 ML: 200 SPRAY DENTAL; PERIODONTAL at 09:30

## 2024-09-30 RX ADMIN — METHOCARBAMOL TABLETS 750 MG: 750 TABLET, COATED ORAL at 13:53

## 2024-09-30 RX ADMIN — HEPARIN, PORCINE (PF) 10 UNIT/ML INTRAVENOUS SYRINGE 5 ML: at 16:15

## 2024-09-30 RX ADMIN — MAGNESIUM OXIDE TAB 400 MG (241.3 MG ELEMENTAL MG) 400 MG: 400 (241.3 MG) TAB at 16:16

## 2024-09-30 RX ADMIN — ESCITALOPRAM OXALATE 10 MG: 10 TABLET ORAL at 12:12

## 2024-09-30 RX ADMIN — PANTOPRAZOLE SODIUM 40 MG: 40 TABLET, DELAYED RELEASE ORAL at 12:12

## 2024-09-30 RX ADMIN — ACETAMINOPHEN 975 MG: 325 TABLET ORAL at 08:06

## 2024-09-30 RX ADMIN — MIDAZOLAM 0.5 MG: 1 INJECTION INTRAMUSCULAR; INTRAVENOUS at 09:32

## 2024-09-30 RX ADMIN — SODIUM CHLORIDE: 9 INJECTION, SOLUTION INTRAVENOUS at 10:35

## 2024-09-30 RX ADMIN — Medication 10 MG: at 20:34

## 2024-09-30 RX ADMIN — METHOCARBAMOL TABLETS 750 MG: 750 TABLET, COATED ORAL at 20:34

## 2024-09-30 RX ADMIN — LISINOPRIL 2.5 MG: 2.5 TABLET ORAL at 12:12

## 2024-09-30 RX ADMIN — THERA TABS 1 TABLET: TAB at 12:12

## 2024-09-30 RX ADMIN — METHOCARBAMOL TABLETS 750 MG: 750 TABLET, COATED ORAL at 08:06

## 2024-09-30 RX ADMIN — IRON SUCROSE 200 MG: 20 INJECTION, SOLUTION INTRAVENOUS at 16:15

## 2024-09-30 RX ADMIN — SODIUM CHLORIDE 350 ML: 9 INJECTION, SOLUTION INTRAVENOUS at 08:45

## 2024-09-30 RX ADMIN — GABAPENTIN 100 MG: 100 CAPSULE ORAL at 22:06

## 2024-09-30 RX ADMIN — ACETAMINOPHEN 975 MG: 325 TABLET ORAL at 16:16

## 2024-09-30 RX ADMIN — AMIODARONE HYDROCHLORIDE 200 MG: 200 TABLET ORAL at 12:12

## 2024-09-30 RX ADMIN — LIDOCAINE HYDROCHLORIDE 30 ML: 20 SOLUTION ORAL at 09:22

## 2024-09-30 ASSESSMENT — LIFESTYLE VARIABLES: TOBACCO_USE: 0

## 2024-09-30 ASSESSMENT — ACTIVITIES OF DAILY LIVING (ADL)
ADLS_ACUITY_SCORE: 31

## 2024-09-30 ASSESSMENT — ENCOUNTER SYMPTOMS: DYSRHYTHMIAS: 1

## 2024-09-30 NOTE — PROCEDURES
[facility]  CAPS NOTE  Date of Admission:  8/30/2024  Consult Requested by: Cardiothoracic Surgery  Reason for Consult: management of symptomatic pleural effusion    POC US Guide for Thorcentesis     Narrative  Limited point of care pleural/lung ultrasound to evaluate for thoracentesis.    Thoracentesis Indication:pleural effusion    Views/Acquisition: Bilateral pleural space(s): upright.    Findings/Interpretation: Pleural effusions are present bilaterally, right > left. There is sufficient fluid for bedside thoracentesis if anticoagulation parameters are met.    Donald Deal MD      History of Present Illness:   74-year-old man here for worsening heart failure and VAD implantation. CAPS consulted for persistent post-operative pleural effusions    Physical Exam:  Vital Signs: Temp: 98.1  F (36.7  C) Temp src: Oral BP: 99/87 Pulse: 77   Resp: 16 SpO2: 95 % O2 Device: None (Room air) Oxygen Delivery: 2 LPM  Weight: 159 lbs 0 oz  General Appearance: Sitting up, no acute distress  Respiratory: Breathing comfortably on room air  Cardiovascular: Lower extremity edema present  GI: Abdomen soft  Neuro: Symmetric movements, speaking normally in full sentences    Medical Decision Making       25 MINUTES SPENT BY ME on the date of service doing chart review, history, exam, documentation & further activities per the note.    Assessment and Plan:  Requested procedure was not performed.   INR today is >3. There is sufficient fluid for thoracentesis so our team will follow for improvement in INR.   We understand that this patient cannot be taken off the warfarin due to cardiac indications but would prefer INR <3 which should be possible given his goal 2-3. CAPS team will continue to collaborate with the primary team about risks and benefits of thoracentesis.     Donald Deal MD  [facility]  Securely message with Vocera (more  info)  Text page via [facility] Paging/Directory   See signed in provider for up to date coverage information

## 2024-09-30 NOTE — PLAN OF CARE
74 year old male with a PMH of HFrEF 2/2 ICM with LVEF 20-30%, atrial fibrillation, 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 Due to worsening functional limitations due to heart failure, patient was worked up for VAD while inpatient. Patient is now s/p implantation of HeartMate 3 LVAD on 9/18/2024 with Dr. Mulvihill. Returned to OR on POD 0 for mediastinal re-exploration, mediastinal clot evacuation, and chest washout.      Neuro: Aox4, denies headache, lightheadedness at rest. Trying to catch up and get adequate sleep.  Resp: RA >92%. Denies SOB at rest.  Cardiac: Start of shift A-fib 90-110s, post cardioversion now SR 90s with 1st deg AVB. Denies chest pain. LVAD HM3 numbers WDL, no alarms.  GI/: Mechanical soft diet tolerating well. Good UOP today. Added additional 1mg PO Bumex for diuresis.  Skin: No new deficits noted  Pain: Denies  Activity: Up assist of 1 with walker  Electrolytes: Did not require replacement prior to cardioversion. Requires Mag replacement this evening, order placed.  LDAs: R DL PICC SL, R PIV SL. 1x CT with little output.     Underwent MELBA and successful cardioversion in morning. US of chest for future thoracentesis completed. Will continue to monitor and report changes to team.

## 2024-09-30 NOTE — PROGRESS NOTES
Cardiovascular Surgery Progress Note  09/30/2024         Assessment and Plan:     Duane C Johnson is a 74 year old male with a PMH of  HFrEF 2/2 ICM with LVEF 20-30%, atrial fibrillation, 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 Due to worsening functional limitations due to heart failure, patient was worked up for VAD while inpatient. Patient is now s/p implantation of HeartMate 3 LVAD on 9/18/2024 with Dr. Mulvihill. Returned to OR on POD 0 for mediastinal re-exploration, mediastinal clot evacuation, and chest washout.     Cardiovascular:   HFrEF 2/2 ICM - s/p HM3 LVAD implant 9/18/24  Cardiogenic shock, improved  Hx afib s/p DCCV 9/5/24 and 9/30/24  Hx VT/VF arrest with ICD in place (5/8/24)  Hx STEMI, CAD s/p PCI to LAD (2023)  HTN  HLD  Continued rate controlled afib since at least 9/21 (per EKG), cardioverted successfully to NSR this AM.   Preop echo showed LV EF 10-15%  Postop echo 9/27 showed LVAD cannula seen in the expected anatomic position in the LV apex, normal flow velocities, moderately reduced RV function, normal IVC   - ASA not indicated  - Atorvastatin 80 mg daily  - PTA amiodarone 200 mg daily  - lisinopril 2.5 mg daily started 9/26 for MAPs in the 90s  - Cards 2 following for heart failure optimization, appreciate assistance  - S/p MELBA/DCCV 9/30 to NSR     Chest tubes: mediastinal tubes removed 9/24, R pleural removed 9/28, pump pocket inocencia drain removed 9/30.     Pulmonary:  - Extubated POD 2; now saturating well on RA   - Supplemental O2 PRN to keep sats > 92%  - Pulm toilet, IS, OOB/activity and deep breathing  Moderate right pleural effusion  Small loculated left pleural effusion  -  CAPS procedure team consulted 9/30 for therapeutic thoracentesis of the right effusion, pending recs     Neurology / MSK:  Acute post-operative pain  - Multimodal analgesia with acetaminophen, oxycodone  PRN, Robaxin PRN, HS gabapentin  Anxiety  - PTA Lexapro resumed  S/p sternotomy   - PT/OT/CR consults   - Sternal precautions  Insomnia  - melatonin increased from 5 mg to 10 mg HS 9/28  Transient right eye visual deficit   - 9/29 patient reported transient discoloration in the top half of the visual field in his right eye, no additional neurologic deficits noted  - neurology consulted - recommend CT head and CTA head/neck to rule out stroke (patient can't get MRI due to LVAD); not a TPA candidate   - CT head and CTA head/neck without acute pathology/significant stenosis  - ophthalmology consulted - no evidence of CRAO. Most likely diagnosis is atypical amaurosis vs possible impending CRVO, agree with CT head/CTA head neck  -  Patient will need non-urgent follow up in ophthalmology clinic for repeat dilated exam, OCT macula each eye, and IVFA with transit right eye      / Renal / FE:  Hyponatremia  Hx prostate cancer s/p Leuprolide/radiation therapy   - Baseline creatinine 0.9-1  - Diuresis: PO Bumex 1 mg daily per Cards2  - lymphedema wraps ordered 9/28  - Replace electrolytes prn per protocol  - Strict I/O, daily standing weights per protocol  - Completed Leuprolide on 6/11/24 and radiation therapy on 6/27/24. Last PSA was <0.01 on 7/29/24   - Daily BMP    GI / Nutrition:   - Mechanical/dental soft diet, continue bowel regimen    Endocrine:  Stress-induced hyperglycemia, resolved  Hgb A1c 5.4%    - Initially managed on insulin drip postop, transitioned to sliding scale; discontinued POCT and sliding scale given maintenance of euglycemia without need for supplemental insulin    Infectious Disease:  Stress-induced leukocytosis, resolved  - WBC WNL, afebrile, no signs or symptoms of infection  - Completed perioperative antibiotics    Hematology:   Acute blood loss anemia  Thrombocytopenia, resolved  Warfarin AC for LVAD  Hgb stable; Plt WNL, no signs or symptoms of active bleeding    Anticoagulation:   - Coumadin  "for LVAD, INR goal 2-3. Most recent INR mildly supra therapeutic at 3.08  - LIH discontinued    Prophylaxis:   - Stress ulcer prophylaxis: Pantoprazole 40 mg daily for 30 days post-operatively  - DVT prophylaxis: warfarin AC, SCD, OOB/activity    Disposition:   - Transferred to  on 9/23  - Therapies recommending discharge to ARU vs TCU  - Medically Ready for Discharge: Anticipated 1-2 days  Barriers to discharge include chest tube (removed 9/30), possible thoracentesis.     Discussed with Dr. Mulvihill.     Rashard Santoyo PA-C  Cardiothoracic Surgery    2:12 PM  September 30, 2024  pager [phone]          Interval History:     No acute events overnight. Slept well overnight.   No visual complaints today.   States pain is generally well controlled.   Tolerating diet with good appetite, + BM. No nausea or vomiting.  Reports his breathing feels \"constricted.\"  Denies chest pain, dizziness/lightheadedness, and sternal popping/clicking/snapping.    Addendum: later in the day patient had another transient right visual deficit. He describes it as similar to yesterday except this time the blue hue was in the lower half of his visual field rather than the top half. This lasted for <5 minutes then completely resolved. No complete loss of vision in any aspect of his visual field, black spots, flashes of light, etc. Denies blurred vision or eye pain. Complete neurologic exam was benign, PEERL. I spoke with both neurology and ophthalmology who agreed his symptoms were similar to yesterday and no further work up is indicated unless there is a change in symptoms. Optho re-iterated recommendation for outpatient optho follow up on discharge.          Physical Exam:   Blood pressure 102/88, pulse 75, temperature 97.7  F (36.5  C), temperature source Oral, resp. rate 16, height 1.676 m (5' 5.98\"), weight 72.1 kg (159 lb), SpO2 97%.  Vitals:    09/28/24 0633 09/29/24 0500 09/30/24 0400   Weight: 71.7 kg (158 lb 1.6 oz) 72 kg (158 lb 11.2 " "oz) 72.1 kg (159 lb)      Admission weight: 66 kg  24 hr Fluid status:  +210 mL    Gen: NAD, sitting up in bed, pleasant, calm, conversant, cooperative on exam  Neuro: A&Ox4, intact, no focal deficits  CV: RRR, NSR on monitor, LVAD hum  Pulm: unlabored breathing on RA, diminished bilateral bases, otherwise CTAB  Abd: nondistended, non-tender  Ext: 1+ peripheral edema bilaterally, no gross deformities  Incision: clean, dry, intact, no erythema or induration, sternum stable  Tubes/drain sites: dressing clean and dry, serous output, no air leak  Lines: driveline dressing clean and dry     Heartmate 3 LEFT VS  Flow (Lpm): 3 Lpm  Pulse Index (PI): 5.9 PI  Speed (rpm): 5200 rpm  Power (jackson): 3.3 jackson  Current Hct settin     Clinically Significant Risk Factors         # Hyponatremia: Lowest Na = 130 mmol/L in last 2 days, will monitor as appropriate      # Hypoalbuminemia: Lowest albumin = 2.2 g/dL at 2024  3:18 AM, will monitor as appropriate        # End stage heart failure: Ventricular assist device (VAD) present          # Overweight: Estimated body mass index is 25.68 kg/m  as calculated from the following:    Height as of this encounter: 1.676 m (5' 5.98\").    Weight as of this encounter: 72.1 kg (159 lb).   # Moderate Malnutrition: based on nutrition assessment      # Financial/Environmental Concerns: none   # ICD device present            Data:    Imaging:  reviewed recent imaging    Recent Results (from the past 24 hour(s))   CT Head w/o Contrast    Narrative    CTA HEAD NECK W CONTRAST, CT HEAD W/O CONTRAST 2024 3:38 PM    CT head without contrast  CT angiogram of the neck   CT angiogram of the base of the brain with contrast  Reconstruction on the 3D workstation    Provided History:  right visual field deficit    Comparison:  Head CT 9/3/2024.      Technique:  CT HEAD: Using multidetector thin collimation helical acquisition  technique, axial, coronal and sagittal CT images from the skull " base  to the vertex were obtained without intravenous contrast.   (topogram) image(s) also obtained and reviewed.    HEAD and NECK CTA: During rapid bolus intravenous injection of  nonionic contrast material, axial images were obtained using thin  collimation multidetector helical technique from the base of the upper  aortic arch through the cranial vertex. This CT angiogram data was  reconstructed at thin intervals with mild overlap. Images were sent to  the 3D workstation, and 3D reconstructions were obtained. The axial  source images, multiplanar reformations, 3D reconstructions in both  maximum intensity projection display and volume rendered models were  reviewed, with reconstructions performed by the technologist. I  personally participated in the 3D reconstruction process and  interpretation of the final, archived 3D images on an independent  workstation.    Contrast: 67ml Isovue 370    Findings:  CT head without contrast demonstrates no intracranial hemorrhage, mass  effect, or midline shift. Gray/white matter differentiation in both  cerebral hemispheres is preserved. Mild generalized cerebral volume  loss, not significantly changed. Mild leukoaraiosis. The basal  cisterns are clear.    The bony calvaria and the bones of the skull base are normal. Chronic  complete opacification of the left maxillary sinus and surrounding  osteitis. Hypoplastic frontal sinuses. Mild dependent left mastoid  effusion.    Head CTA demonstrates no aneurysm or stenosis of the major  intracranial arteries.    Neck CTA demonstrates no stenosis of the major cervical arteries. The  origins of the great vessels from the aortic arch are patent. The  normal distal right internal carotid artery measures 5 mm. The normal  distal left internal carotid artery measures 5 mm.     Moderate pulmonary artery enlargement. Partially visualized atypical  branching vessel off the ascending aorta. Partially visualized changes  of median  sternotomy. Left chest implantable cardiac defibrillator and  right upper extremity PICC line noted. Moderate right and loculated  left pleural effusions.       Impression    Impression:    1. No acute intracranial pathology. Chronic left maxillary sinusitis.  2. Head CTA demonstrates no aneurysm or stenosis of the major  intracranial arteries.   3. Neck CTA demonstrates no stenosis of the major cervical arteries.  4. Moderate right and loculated left pleural effusions with associated  moderate pulmonary artery enlargement likely related to transient  pulmonary hypertension.    I have personally reviewed the examination and initial interpretation  and I agree with the findings.    ALEC MCKEON MD         SYSTEM ID:  [id number]   CTA Head Neck with Contrast    Narrative    CTA HEAD NECK W CONTRAST, CT HEAD W/O CONTRAST 9/29/2024 3:38 PM    CT head without contrast  CT angiogram of the neck   CT angiogram of the base of the brain with contrast  Reconstruction on the 3D workstation    Provided History:  right visual field deficit    Comparison:  Head CT 9/3/2024.      Technique:  CT HEAD: Using multidetector thin collimation helical acquisition  technique, axial, coronal and sagittal CT images from the skull base  to the vertex were obtained without intravenous contrast.   (topogram) image(s) also obtained and reviewed.    HEAD and NECK CTA: During rapid bolus intravenous injection of  nonionic contrast material, axial images were obtained using thin  collimation multidetector helical technique from the base of the upper  aortic arch through the cranial vertex. This CT angiogram data was  reconstructed at thin intervals with mild overlap. Images were sent to  the 3D workstation, and 3D reconstructions were obtained. The axial  source images, multiplanar reformations, 3D reconstructions in both  maximum intensity projection display and volume rendered models were  reviewed, with reconstructions performed by the  technologist. I  personally participated in the 3D reconstruction process and  interpretation of the final, archived 3D images on an independent  workstation.    Contrast: 67ml Isovue 370    Findings:  CT head without contrast demonstrates no intracranial hemorrhage, mass  effect, or midline shift. Gray/white matter differentiation in both  cerebral hemispheres is preserved. Mild generalized cerebral volume  loss, not significantly changed. Mild leukoaraiosis. The basal  cisterns are clear.    The bony calvaria and the bones of the skull base are normal. Chronic  complete opacification of the left maxillary sinus and surrounding  osteitis. Hypoplastic frontal sinuses. Mild dependent left mastoid  effusion.    Head CTA demonstrates no aneurysm or stenosis of the major  intracranial arteries.    Neck CTA demonstrates no stenosis of the major cervical arteries. The  origins of the great vessels from the aortic arch are patent. The  normal distal right internal carotid artery measures 5 mm. The normal  distal left internal carotid artery measures 5 mm.     Moderate pulmonary artery enlargement. Partially visualized atypical  branching vessel off the ascending aorta. Partially visualized changes  of median sternotomy. Left chest implantable cardiac defibrillator and  right upper extremity PICC line noted. Moderate right and loculated  left pleural effusions.       Impression    Impression:    1. No acute intracranial pathology. Chronic left maxillary sinusitis.  2. Head CTA demonstrates no aneurysm or stenosis of the major  intracranial arteries.   3. Neck CTA demonstrates no stenosis of the major cervical arteries.  4. Moderate right and loculated left pleural effusions with associated  moderate pulmonary artery enlargement likely related to transient  pulmonary hypertension.    I have personally reviewed the examination and initial interpretation  and I agree with the findings.    ALEC MCKEON MD         SYSTEM ID:   [id number]   XR Chest Port 1 View    Narrative    Exam: XR CHEST PORT 1 VIEW, 9/30/2024 6:10 AM    Comparison: Radiograph 9/29/2024, 9/28/2024    History: chest tubes s/p LVAD insertion    Findings:  Portable AP view of the chest. Support devices appear stable. Stable  enlarged cardiomediastinal silhouette. Aortic arch calcifications.  Stable postsurgical changes of the chest. Intact median sternotomy  wires. Similar hazy bibasilar opacities. Persistent small right  pleural effusion. No pneumothorax. No significant left pleural  effusion though a small one cannot be excluded given the left  costophrenic angle is obscured by the LVAD.      Impression    Impression:   1. Similar hazy bibasilar opacities with persistent small right  pleural effusion.  2. Support devices appear stable.    I have personally reviewed the examination and initial interpretation  and I agree with the findings.    KHLOE FRANCISCO DO         SYSTEM ID:  [id number]       Labs:  BMP  Recent Labs   Lab 09/30/24 0414 09/29/24 0458 09/28/24 0450 09/27/24 0441   * 130* 129* 128*   POTASSIUM 4.3 4.3 4.8 4.3   CHLORIDE 98 99 98 97*   PRANAV 7.9* 7.9* 7.8* 7.8*   CO2 24 24 25 25   BUN 17.1 15.9 17.7 20.1   CR 0.63* 0.64* 0.66* 0.64*   GLC 85 88 93 91     CBC  Recent Labs   Lab 09/30/24 0414 09/29/24 0458 09/28/24 0450 09/27/24 0441   WBC 9.3 8.9 9.0 8.1   RBC 2.63* 2.61* 2.63* 2.55*   HGB 7.9* 7.9* 7.9* 7.5*   HCT 25.4* 24.6* 25.0* 24.3*   MCV 97 94 95 95   MCH 30.0 30.3 30.0 29.4   MCHC 31.1* 32.1 31.6 30.9*   RDW 16.7* 16.6* 16.6* 16.7*    260 250 235     INR  Recent Labs   Lab 09/30/24 0414 09/29/24 0458 09/28/24  0450 09/27/24  0441   INR 3.08* 3.39* 3.00* 2.72*      Hepatic Panel  Recent Labs   Lab 09/27/24  0441 09/26/24  0504 09/25/24  0520 09/24/24  0436   AST 34 34 23 24   ALT 28 29 15 17   ALKPHOS 76 79 68 69   BILITOTAL 0.3 0.3 0.4 0.4   ALBUMIN 2.4* 2.4* 2.3* 2.3*     GLUCOSE:   Recent Labs   Lab 09/30/24  0414 09/29/24  0458  09/28/24  0450 09/27/24  0441 09/26/24  0504 09/25/24  1115   GLC 85 88 93 91 83 115*        Calm/Appropriate

## 2024-09-30 NOTE — PROGRESS NOTES
CLINICAL NUTRITION SERVICES - BRIEF NOTE    Met with pt at bedside. Assisted pt with ordering lunch. Denies N/V. Reports some diarrhea. Missing teeth but has been tolerating mechanical soft diet. Pt reports variable appetite/intake but states he tries for 2 meals/day typically. Reviewed menu options. Pt drinking Ensure and Profitect corepower drinks for additional protein. Encouraged continued PO efforts.    INTERVENTIONS  Recommendations / Nutrition Prescription  Encourage small, frequent PO attempts with high protein sources  Ensure, magic cup or Core Power drink 42g Pro daily.     Nutrition Education  1.  Provided instruction on the need to eat small, frequent meals. Importance of adequate oral intake post-op, especially for 6-8 weeks, was discussed.  2.  Provided handout regarding the amounts of protein in various food items. Discussed protein goals with pt.   Implementation  Nutrition education for recommended modifications    Malina Suresh MS, RD, LD, [facility]    6C (beds 0545-4700) + 7C (beds 3905-7502) + ED   Available in [facility] by name or unit dietitian

## 2024-09-30 NOTE — PLAN OF CARE
"Hours of Care: 4p-7a    Neuro: A&Ox 4. Denies dizziness. Had brief episode of \"seeing stars\" and left arm pain yesterday for which Neuro and ophthalmology was consulted. Denied any symptoms this shift. Expressed anxiety and acknowledged caregiver stress of his wife. Continue reinforcing teachings.    Cardiac/Telemetry: Vpacing with underlying afib rhythm. HM3, numbers WNL, no alarms this shift.     Respiratory: Lungs CTA on R/A. Sating >95% on RA. Denies SOB.    GI/: No BM this shift. Voiding via urinal, clear citlalli moderate output.    Diet/Appetite: Mechanical soft diet; good appetite.     Skin: No new deficits. 1 CT to suction, minimal output (10 ml) this shift. LVAD driveline site WNL. R PIV, SL, R DL PICC saline locked. CHG wipes done, VAD drive line and CT site dressing change done this shift.    Activity: Moves Ax1 with walker and GB. On sternal precautions. Changes positions independently in bed. Wheeled around the unit this am.    Pain: Complains of back pain and rib pain, managed with scheduled Tylenol, Robaxin, and Gabapentin. No PRNs given this shift.    Sleep: Woke up at 1 am and requested to be wheeled around the unit. Went back to sleep afterwards.     Plan: NPO since midnight for MELBA and cardioversion today. Signed and held orders due to be released. 5.00 am electrolytes already drawn, so no need to release electrolyte signed and held orders.    Goal Outcome Evaluation:    Plan of Care Reviewed With: Patient    Overall Patient Progress: Improving    Outcome Evaluation: Pt ready for MELBA/DCCV today. Continues to receive LVAD teachings. Awaiting bed in ARU. Worried about spouse's coping abilities and caregiver stress.    "

## 2024-09-30 NOTE — TELEPHONE ENCOUNTER
atypical amaurosis right eye. VTD, OCT macula each eye, and IVFA transit right eye.    --    Above per on call eye provider.    Tentatively schedulin October 22nd with Dr. Urias at 0930 at [facility]    Clinic to reach out to review/confirm scheduling.    Maxwell Zapata RN 8:02 AM 09/30/24

## 2024-09-30 NOTE — PROGRESS NOTES
[facility]   Cardiology II Service / Advanced Heart Failure  Daily Consult Note      Patient: Duane C Johnson  MRN: [id number]  Admission Date: 8/30/2024  Hospital Day # 31    Assessment and Plan: Duane C Johnson is a 74 year old male pmhx of anterior STEMI s/p PCI to the pLAD on 10/26/23 with a VT/VF arrest the next day (10/27) with patent stents and unchanged anatomy on repeat angiogram. Placed on amiodarone and discharged 11/03/23, ICD was not indicated as this was within 48h of his MI. His EF prior to discharge was 20-30% with a large area of akinesis in the LAD, placed on apixaban due to this (Apixaban dcd on 7/5/24). Has had multiple admissions for HF exacerbation last year.  Admitted on 8/30/24 for CHF exacerbation with BNP 16k. CPX and RHC showed significant functional limitations due to heart failue. Underwent HM3 LVAD on 9/18/24.     Recommendations:  - continue lisinopril 2.5 mg daily  - starting bumex 1 mg daily, aiming for gentle diuresis as most of the volume seems to be third spaced  - adding aldactone 12.5 tomorrow  - Iron studies suggestive of RADHA. Started on IV venofer x5 days  (today is day 3/5)  - MELBA/DCCV today with successful conversion to sinus  - CVTS may consider diagnostic/therapeutic thora with caps tomorrow pending INR  - Ongoing vad teaching    # Acute on chronic systolic heart failure secondary to ICM   # s/p HM3 LVAD as DT on 9/18/2024  # CAD s/p PCI  # History of STEMI  # s/p ICD 5/2024  Stage D. NYHA Class III.    Fluid status: mild hypervolemia centrally with significant third spacing  ACEi/ARB/ARNi: returned to lisinopril 2.5 mg daily (increased to 5 mg daily on 9/29, but had soft bps)  BB recent LVAD, will defer to outpatient  Aldosterone antagonist deferred while other medical therapy is prioritized  SGLT2i:  SCD prophylaxis ICD  MAP: goal 65-85, current MAPs 86-94  LDH trends: 271, stable  Anticoagulation: warfarin dosing per pharmacy, INR goal 2-3, today  3.08  Antiplatelet: ASA not indicated in LVAD population, > 6 months s/p STEMI  Limited TTE 9/27 with LVIDd 4.0 cm, Closed AoV, no AI, normal LVAD flow velocities, moderately reduced RV, normal IVC.     # History of VT/VF arrest 2023  # AFib s/p DCCV 9/2024 and again 9/30/2024  - Successfully cardioverted in early September for AF. Since then EKG suggested AF on 9/21. Tele is only available from as early as 9/22. The patient was started on hep gtt 9/21, but unclear if was in AF before this. While the patient was on hep gtt until INR was therapeutic, it is not possible to assess rhythm prior to 9/21. Systemic AC was off on 9/18-9/20. S/p MELBA and DCCV on 9/30 with conversion to sinus rhythm   - continue amiodarone 200 mg daily   - Continue AC as above    # Visual changes. Stoke code call 9/29 for visual changes - right eye with decreased upper half of vision and bluish haze. Resolved after 30 minutes. Seen by opthalmology and neuro. Negative head CT and CTA head and neck. He had another episode but if right lower eye on 9/30, lasted 5 minutes.  - Ophthalmology and Neuro, signed off  - Already on A/C  - Continue to monitor for changes    # Hyponatremia hypovolemia   Na 131, ranging 128-133  - encourage PO intake  - starting low dose bumex today and aldactone tomorrow- may need to adjust if na worsens again  - daily BMP    # Anemia  - No overt bleeds, slow downtrend since surgery. LDH downtrending.   - Iron studies suggestive of RADHA. Started on IV venofer x5 days    Time/Communication  I spent 45 minutes reviewing results, care team notes, meeting directly with the patient, coordinating care, and completing documentation on the day of service.     Torrie Forrest PA-C  [facility] Cardiology  Advanced Heart Failure  ================================================================    Subjective/24-Hr Events:   No acute events overnight. He has a bit of a dry cough today. No lightheadedness or dizziness. No pre-syncope or  "syncope. No LE edema. No abdominal edema. No sternal concerns. He does hae some left lower rib pain. No bleeding symptoms. No infectious symptoms.     ROS:  4 point ROS including respiratory, CV, GI and  (other than that noted in the HPI) is negative.     Medications: Reviewed in EPIC.     Physical Exam:   /88 (BP Location: Left arm, Patient Position: Semi-Velázquez's, Cuff Size: Adult Regular)   Pulse 75   Temp 97.7  F (36.5  C) (Oral)   Resp 16   Ht 1.676 m (5' 5.98\")   Wt 72.1 kg (159 lb)   SpO2 97%   BMI 25.68 kg/m      GENERAL: Appears comfortable, in no distress.  HEENT: Eye symmetrical, no discharge or icterus bilaterally.  NECK: Supple,   CV: + LVAD hum  RESPIRATORY: Respirations regular, even, and unlabored. Lungs CTA throughout.    GI: Soft and non distended with normoactive bowel sounds present in all quadrants. No tenderness, rebound, guarding.   EXTREMITIES: No BLE peripheral edema. All extremities are warm and well perfused.  NEUROLOGIC: Alert and interacting appropriatly. No focal deficits.   SKIN: No jaundice. No rashes or lesions. Sternum c/d/i    Labs:  CMP  Recent Labs   Lab 09/30/24  0414 09/29/24  0458 09/28/24  0450 09/27/24  0441 09/26/24  0504 09/25/24  0733 09/25/24  0520 09/24/24  0752 09/24/24  0436   * 130* 129* 128* 129*  --  130*  --  131*   POTASSIUM 4.3 4.3 4.8 4.3 4.4  --  4.2  --  4.3   CHLORIDE 98 99 98 97* 98  --  101  --  98   CO2 24 24 25 25 24  --  26  --  27   ANIONGAP 9 7 6* 6* 7  --  3*  --  6*   GLC 85 88 93 91 83   < > 90   < > 88   BUN 17.1 15.9 17.7 20.1 26.6*  --  27.5*  --  26.8*   CR 0.63* 0.64* 0.66* 0.64* 0.63*  --  0.64*  --  0.65*   GFRESTIMATED >90 >90 >90 >90 >90  --  >90  --  >90   PRANAV 7.9* 7.9* 7.8* 7.8* 7.6*  --  7.8*  --  7.8*   MAG 1.8 1.8 1.8 1.9 2.0  --  2.0  --  1.9   PHOS 3.0 3.0 3.1 3.0 2.9  --  3.1  --  2.9   PROTTOTAL  --   --   --  5.3* 5.3*  --  5.3*  --  5.1*   ALBUMIN  --   --   --  2.4* 2.4*  --  2.3*  --  2.3*   BILITOTAL  --  "  --   --  0.3 0.3  --  0.4  --  0.4   ALKPHOS  --   --   --  76 79  --  68  --  69   AST  --   --   --  34 34  --  23  --  24   ALT  --   --   --  28 29  --  15  --  17    < > = values in this interval not displayed.       CBC  Recent Labs   Lab 09/30/24 0414 09/29/24 0458 09/28/24 0450 09/27/24 0441   WBC 9.3 8.9 9.0 8.1   RBC 2.63* 2.61* 2.63* 2.55*   HGB 7.9* 7.9* 7.9* 7.5*   HCT 25.4* 24.6* 25.0* 24.3*   MCV 97 94 95 95   MCH 30.0 30.3 30.0 29.4   MCHC 31.1* 32.1 31.6 30.9*   RDW 16.7* 16.6* 16.6* 16.7*    260 250 235       INR  Recent Labs   Lab 09/30/24 0414 09/29/24 0458 09/28/24 0450 09/27/24 0441   INR 3.08* 3.39* 3.00* 2.72*

## 2024-09-30 NOTE — PROGRESS NOTES
[facility]    Vascular Neurology Progress Note    Interval History     No acute overnight events. Patient reports having no additional episodes of vision symptoms. Denies new weakness, numbness, tingling, speech difficulties etc.      Hospital Course     Chief complaint: Abnormal Labs    Duane C Johnson is a 74 year old male with PMHx of STEMI 10/2023, A-fib s/p DCCV on 9/5 and on warfarin, HTN, HLD, and s/p LVAD placement on 9/18 who has been experiencing transient visual changes over the last day prompting a neurology stroke referral. Pt reports his first episode was yesterday afternoon when he was looking out his window and noticed the upper half of his visual field on the right had a blue hue to it. He states this seemed to persist for about 10 minutes or so and then resolved. Pt describes a similar episode late this morning with analogous features that resolved after about 20 minutes. He denies any ongoing symptoms and reports his vision is at his baseline. He denies any associated loss of visual acuity, double vision, eye pain, or floaters. He also denies any associated headaches, dizziness, lightheadedness weakness, or sensory changes. He has not been very mobile but denies any changes to his balance.      Denies history of strokes or seizures. 20+ year smoking history. Denies other infectious symptoms such as fever, chills, and myalgias.      Assessment and Plan       #Transient neurological symptom; (Bluish hue in R superior quadrantx 2 episodes)  Duane C Johnson is a 74 year old male with PMHx of STEMI 10/2023, A-fib s/p DCCV on 9/5 and on warfarin, HTN, HLD, and s/p LVAD placement on 9/18 who has been experiencing transient visual changes over the last day prompting a neurology stroke referral, who is being evaluated by stroke neurology for a transient visual change. He has numerous risk factors for possible stroke including the recent LVAD procedure,  A-fib, HTN, smoker, and HLD. He is currently supratherapeutic on his AC with an INR of 3.3. His neurologic exam is completely benign. 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 MRI unforunately can not be obtained due to his LVAD. Given he is already on anticoagulation and had a recent procedure, he would not be a candidate for thrombolysis. At this time there are no deficits to indicate an LVO requiring thrombectomy. He was evaluated by ophthalmology, who ruled out CRAO, GCA and NAION as an etiology. Their impression was atypical amaurosis. We are not fully convinced for amaurosis with the clinical presentation of bluish hue and he was able to see through it. Even, if he had those, he is on Warfarin and does not require any additional therapy for secondary prevention.     Recommendations:  -Continue Warfarin per primary team   No further stroke evaluation is recommended, so we will sign off. Please contact us with any additional questions.    Medically Ready for Discharge: Per primary team    The patient was discussed with the Stroke Staff Dr. Martinez.    Jana Pickens MD  Neurology Resident PGY3    Physical Examination     Temp: 98  F (36.7  C) Temp src: Oral   Pulse: 90   Resp: 16 SpO2: 98 % O2 Device: None (Room air)    General:  patient sitting bed without any acute distress    HEENT:  normocephalic/atraumatic  Cardio:   LVAD in place  Pulmonary:  no respiratory distress  Abdomen:  soft  Extremities:  no edema  Skin:  intact      Neuro Exam  Mental Status:  alert, oriented x 3, follows commands, speech clear and fluent, naming and repetition normal  Cranial Nerves:  visual fields intact, PERRL, EOMI with normal smooth pursuit, facial sensation intact and symmetric, facial movements symmetric, hearing not formally tested but intact to conversation, no  dysarthria  Motor:  normal muscle tone and bulk, no abnormal movements, able to move all limbs spontaneously, strength 5/5 throughout upper and lower extremities, no pronator drift  Reflexes:  2+ and symmetric throughout, toes down-going  Sensory:  light touch sensation intact and symmetric throughout upper and lower extremities, no extinction on double simultaneous stimulation   Coordination:  normal finger-to-nose and heel-to-shin bilaterally without dysmetria  Station/Gait:  deferred    Stroke Scales       National [employer] Stroke Scale  Exam Interval: Baseline   Score    Level of consciousness: (0)   Alert, keenly responsive    LOC questions: (0)   Answers both questions correctly    LOC commands: (0)   Performs both tasks correctly    Best gaze: (0)   Normal    Visual: (0)   No visual loss    Facial palsy: (0)   Normal symmetrical movements    Motor arm (left): (0)   No drift    Motor arm (right): (0)   No drift    Motor leg (left): (0)   No drift    Motor leg (right): (0)   No drift    Limb ataxia: (0)   Absent    Sensory: (0)   Normal- no sensory loss    Best language: (0)   Normal- no aphasia    Dysarthria: (0)   Normal    Extinction and inattention: (0)   No abnormality        Total Score:  0 @ 900am 9/30      Imaging/Labs   (Bolded imaging and labs new and/or personally reviewed or re-reviewed by me today by me)    MRI/Head CT 2. Head CTA demonstrates no aneurysm or stenosis of the major  intracranial arteries.    Intracranial Vasculature Impression:    1. No acute intracranial pathology. Chronic left maxillary sinusitis.  2. Head CTA demonstrates no aneurysm or stenosis of the major  intracranial arteries.      Cervical Vasculature 3. Neck CTA demonstrates no stenosis of the major cervical arteries.  4. Moderate right and loculated left pleural effusions with associated  moderate pulmonary artery enlargement likely related to transient  pulmonary hypertension.     Echocardiogram MELBA refer to  9/18/24   EKG/Telemetry 9/21/Atrial fibrillation with frequent ventricular-paced complexes   Right ventricular hypertrophy with repolarization abnormality   Septal infarct , age undetermined   Possible Lateral infarct , age undetermined      LDL  9/4/2024: 45 mg/dL   A1C  9/18/2024: 5.4 %   Troponin No lab value available in past 48 hrs

## 2024-09-30 NOTE — PRE-PROCEDURE
GENERAL PRE-PROCEDURE:     Written consent obtained?: Yes    Risks and benefits: Risks, benefits and alternatives were discussed    Consent given by:  Patient  Patient states understanding of procedure being performed: Yes    Patient's understanding of procedure matches consent: Yes    Procedure consent matches procedure scheduled: Yes    Expected level of sedation:  Moderate  Appropriately NPO:  Yes  ASA Class:  3  Mallampati  :  Grade 3- soft palate visible, posterior pharyngeal wall not visible  Lungs:  Other (comment)  Lung exam comment:  Unlabored work of breathing  Heart:  A-fib  History & Physical reviewed:  History and physical reviewed and no updates needed  Statement of review:  I have reviewed the lab findings, diagnostic data, medications, and the plan for sedation

## 2024-09-30 NOTE — ANESTHESIA POSTPROCEDURE EVALUATION
Patient: Duane C Johnson    Procedure: Procedure(s):  Anesthesia cardioversion       Anesthesia Type:  No value filed.    Note:  Disposition: Inpatient   Postop Pain Control: Uneventful            Sign Out: Well controlled pain   PONV: No   Neuro/Psych: Uneventful            Sign Out: Acceptable/Baseline neuro status   Airway/Respiratory: Uneventful            Sign Out: Acceptable/Baseline resp. status   CV/Hemodynamics: Uneventful            Sign Out: Acceptable CV status; No obvious hypovolemia; No obvious fluid overload   Other NRE: NONE   DID A NON-ROUTINE EVENT OCCUR? No    Event details/Postop Comments:  Patient awake and answering questions appropriately.  Vitals stable.  Care transferred to echo lab staff           Last vitals:  Vitals:    09/30/24 1141 09/30/24 1200 09/30/24 1230   BP:      Pulse: 70 71 75   Resp: 16  16   Temp:      SpO2: 96% 96% 97%       Electronically Signed By: Tamiko Chawla MD  September 30, 2024  1:05 PM

## 2024-09-30 NOTE — SEDATION DOCUMENTATION
"Pt arrived in ECHO department from  for scheduled MELBA and cardioversion.   Procedure explained, questions answered and consent signed. Discharge instructions discussed with patient.  Pt's throat sprayed at 0930, therefore pt will not be able to eat or drink until 2 hours after at 1130. Informed pt of this time and encouraged to start with warm fluids and soft foods.    Pt was given a total of 50 mcg IV fentanyl and 1.5 mg IV versed for conscious sedation. Pt denied pain after MELBA complete. VAD P.I. fluctuated dramatically during procedure from 2.8 to 7.5.  RN and team notified of this.  MELBA probe 68 used for procedure.  No clots visualized on MELBA so proceeded with DCCV. LVAD patient placed on \"wall controller\" per MD request prior to cardioversion.  Anesthesia gave pt IV brevitol for sedation and pt was DCCV at 200 Joules to a SR with first degree AV block.  Pt denied chest or throat pain after procedure and was transported in bed back to  accompanied by float RN after awake and VSS. Back up VAD batteries accompany patient.   Report given to  BLADE Dumont.  "
Pt arrived in ECHO department from unit 6C for scheduled MELBA and DCCV.  Procedure explained, questions answered and consent signed. Discharge instructions discussed with patient.  Pt's throat sprayed at 0845, therefore pt will not be able to eat or drink until 2 hours after at 1045. Informed pt of this time and encouraged to start with warm fluids and soft foods.    Pt tolerated procedure well, and was given a total of 25 mcg IV fentanyl and 2 mg IV versed for conscious sedation. Pt denied throat or chest pain after MELBA complete.   MELBA probe 66 used for procedure.  No clots visualized on MELBA so proceeded with DCCV. Anesthesia gave pt 50 mg IV brevitol for sedation and pt was DCCV at 120 Joules to a SR/atrial paced rhythm.  Pt denied chest or throat pain after procedure and was transported back to  on monitor/stretcher after awake and VSS.   Report given to Tamiko CHACON RN.   
3

## 2024-09-30 NOTE — ANESTHESIA CARE TRANSFER NOTE
Patient: Duane C Johnson    Procedure: Procedure(s):  Anesthesia cardioversion       Diagnosis: Atrial fibrillation, unspecified type (H) [I48.91]  Diagnosis Additional Information: No value filed.    Anesthesia Type:   No value filed.     Note:    Oropharynx: oropharynx clear of all foreign objects and spontaneously breathing  Level of Consciousness: awake  Oxygen Supplementation: nasal cannula  Level of Supplemental Oxygen (L/min / FiO2): 2 LPM  Independent Airway: airway patency satisfactory and stable  Dentition: dentition changed  Vital Signs Stable: post-procedure vital signs reviewed and stable    Patient transferred to: [facility]          Vitals:  Vitals Value Taken Time   BP 92/72 09/30/24 1058   Temp     Pulse 67 09/30/24 1103   Resp 27 09/30/24 1103   SpO2 100 % 09/30/24 1056   Vitals shown include unfiled device data.    Electronically Signed By: NADEGE Mitchell CRNA  September 30, 2024  11:04 AM

## 2024-09-30 NOTE — TELEPHONE ENCOUNTER
Called and not able to leave a VM     Appointment information will be in Duane discharge paperwork and I sent a [employer] message     New pt packet sent     Leigh Cabezas Communication Facilitator on 9/30/2024 at 9:12 AM

## 2024-09-30 NOTE — PROGRESS NOTES
Post-VAD Social Work Services Progress Note    Date of Initial Social Work Evaluation: 09/05/24  Collaborated with: CVTS, [facility] Liaison    Data: Duane was evaluated for LVAD and is remaining inpatient until he receives his VAD implant. He continues to work with therapies and working towards increasing his nutrition prior to LVAD implant. Duane received his LVAD on 9/18/24. Patient was extubated on 9/20 and transferred to  on 9/24. Patient had chest tube removed on 9/24 and continues to work with therapies. Patient anticipated to have his last chest tube out today and had a cardioversion this morning. PT is recommending ARU when medically ready for discharge. [facility] is following.  Intervention: SW collaborated with CVTS and [facility] Liaison to discuss medical readiness. Patient not medically ready to transition to ARU today. Estimated discharge remains to be 10/1 or 10/2. [facility] continues to follow. Patient aware of plan of care.    Assessment: Patient not medically ready to discharge. Patient anticipated to be ready in the next 1-2 days.  Education provided by SW: discharge planning  Plan:    Discharge Plans in Progress: [facility]    Barriers to d/c plan: chest tube, medical readiness, new VAD    Follow up Plan: SW to continue to follow for any potential psychosocial concerns pre and post VAD implant.      Jessi Almendarez, MSW, LGSW, APSW  Heart Transplant/[facility]   Teams/Vocera  Ph. [phone]

## 2024-09-30 NOTE — INTERIM SUMMARY
Neurology Brief Note:    Primary team called about patient having another episode of seeing R eye bluish hue that lasted for around 10mins. This time it was in inferior quadrant of the right eye. No other focal neurologic deficits were reported.     Recommendations:  -Do not recommend any additional imaging at this point      Patient was discussed with Dr Martinez.    NANCY Prescott, MS  Neurology PGY3

## 2024-09-30 NOTE — ANESTHESIA PREPROCEDURE EVALUATION
Anesthesia Pre-Procedure Evaluation    Patient: Duane C Johnson   MRN: [id number] : 1950        Procedure : Procedure(s):  Anesthesia cardioversion          Past Medical History:   Diagnosis Date    Benign essential hypertension     CAD (coronary artery disease)     Chronic systolic heart failure (H)     Hypertension     ST elevation myocardial infarction (STEMI), unspecified artery (H)     Ventricular fibrillation (H)       Past Surgical History:   Procedure Laterality Date    ANESTHESIA CARDIOVERSION N/A 2024    Procedure: Anesthesia cardioversion;  Surgeon: GENERIC ANESTHESIA PROVIDER;  Location: [facility]    COLONOSCOPY N/A 3/23/2023    Procedure: COLONOSCOPY, FLEXIBLE, WITH LESION REMOVAL USING SNARE;  Surgeon: Jose Faustin MD;  Location: [facility]    CV CENTRAL VENOUS CATHETER PLACEMENT N/A 10/26/2023    Procedure: Central Venous Catheter Placement;  Surgeon: Rob Lyles MD;  Location:  HEART CARDIAC CATH LAB    CV CORONARY ANGIOGRAM N/A 10/27/2023    Procedure: Coronary Angiogram;  Surgeon: Rob Lyles MD;  Location:  HEART CARDIAC CATH LAB    CV CORONARY ANGIOGRAM N/A 10/26/2023    Procedure: Coronary Angiogram;  Surgeon: Rob Lyles MD;  Location:  HEART CARDIAC CATH LAB    CV LEFT HEART CATH N/A 10/26/2023    Procedure: Left Heart Catheterization;  Surgeon: Rob Lyles MD;  Location:  HEART CARDIAC CATH LAB    CV PCI N/A 10/27/2023    Procedure: Percutaneous Coronary Intervention;  Surgeon: Rob Lyles MD;  Location:  HEART CARDIAC CATH LAB    CV PCI STENT DRUG ELUTING N/A 10/26/2023    Procedure: Percutaneous Coronary Intervention Stent;  Surgeon: Rob Lyles MD;  Location:  HEART CARDIAC CATH LAB    CV RIGHT HEART CATH MEASUREMENTS RECORDED N/A 2024    Procedure: Heart Cath Right Heart Cath;  Surgeon: Rob Lyles MD;  Location:  HEART CARDIAC CATH LAB    CV RIGHT HEART CATH MEASUREMENTS RECORDED  N/A 9/3/2024    Procedure: Right Heart Catheterization;  Surgeon: Aaron Mesa MD;  Location:  HEART CARDIAC CATH LAB    EP ICD INSERT SINGLE N/A 2024    Procedure: Implantable Cardioverter Defibrillator Device & Lead Implant Dual;  Surgeon: Guero Black MD;  Location:  HEART CARDIAC CATH LAB    INJECTION, HYDROGEL SPACER N/A 2024    Procedure: INJECTION, HYDROGEL SPACER AND FIDUCIAL MARKER PLACEMENT;  Surgeon: Stanislaw Barros MD;  Location: [facility]    INSERT VENTRICULAR ASSIST DEVICE LEFT (HEARTMATE II) N/A 2024    Procedure: Median Sternotomy, INSERTION of LEFT VENTRICULAR ASSIST DEVICE (HEARTMATE III), cardiopulmonary bypass, transesophageal echocardiogram performed by anesthesia.;  Surgeon: Mulvihill, Michael, MD;  Location: [facility]    IRRIGATION AND DEBRIDEMENT CHEST WASHOUT, COMBINED Right 2024    Procedure: Exploration of chest, chest washout;  Surgeon: Mulvihill, Michael, MD;  Location: [facility]      Allergies   Allergen Reactions    Brilinta [Ticagrelor] Other (See Comments) and Difficulty breathing     Per pt and spouse, hyperventilation.    Clonazepam Other (See Comments)     Per spouse, acted like a zombie and he was shaky, could barely talk.      Social History     Tobacco Use    Smoking status: Former     Current packs/day: 0.00     Average packs/day: 0.3 packs/day for 30.0 years (7.5 ttl pk-yrs)     Types: Cigarettes     Start date: 10/26/1993     Quit date: 10/26/2023     Years since quittin.9     Passive exposure: Past    Smokeless tobacco: Never    Tobacco comments:     Quit 10/26/2023   Substance Use Topics    Alcohol use: Yes     Comment: 1-2 beers per day      Wt Readings from Last 1 Encounters:   24 72.1 kg (159 lb)        Anesthesia Evaluation   Pt has had prior anesthetic.         ROS/MED HX  ENT/Pulmonary:    (-) tobacco use   Neurologic:    (-) no CVA   Cardiovascular: Comment: S/p HM3 now with postop Afib    (+)  hypertension- -  CAD -  - -       "CHF             ICD     dysrhythmias, a-fib,          (-) pulmonary hypertension   METS/Exercise Tolerance:     Hematologic:     (+)      anemia, history of blood transfusion,         Musculoskeletal:       GI/Hepatic:    (-) esophageal disease   Renal/Genitourinary:    (-) renal disease   Endo:    (-) chronic steroid usage   Psychiatric/Substance Use:       Infectious Disease:    (-) Recent Fever   Malignancy:       Other:            Physical Exam    Airway        Mallampati: II   TM distance: > 3 FB   Neck ROM: full   Mouth opening: > 3 cm    Respiratory Devices and Support         Dental           Cardiovascular          Rhythm and rate: irregular and normal     Pulmonary                   OUTSIDE LABS:  CBC:   Lab Results   Component Value Date    WBC 9.3 09/30/2024    WBC 8.9 09/29/2024    HGB 7.9 (L) 09/30/2024    HGB 7.9 (L) 09/29/2024    HCT 25.4 (L) 09/30/2024    HCT 24.6 (L) 09/29/2024     09/30/2024     09/29/2024     BMP:   Lab Results   Component Value Date     (L) 09/30/2024     (L) 09/29/2024    POTASSIUM 4.3 09/30/2024    POTASSIUM 4.3 09/29/2024    CHLORIDE 98 09/30/2024    CHLORIDE 99 09/29/2024    CO2 24 09/30/2024    CO2 24 09/29/2024    BUN 17.1 09/30/2024    BUN 15.9 09/29/2024    CR 0.63 (L) 09/30/2024    CR 0.64 (L) 09/29/2024    GLC 85 09/30/2024    GLC 88 09/29/2024     COAGS:   Lab Results   Component Value Date     (H) 09/21/2024    INR 3.08 (H) 09/30/2024    FIBR 525 (H) 09/21/2024     POC: No results found for: \"BGM\", \"HCG\", \"HCGS\"  HEPATIC:   Lab Results   Component Value Date    ALBUMIN 2.4 (L) 09/27/2024    PROTTOTAL 5.3 (L) 09/27/2024    ALT 28 09/27/2024    AST 34 09/27/2024    ALKPHOS 76 09/27/2024    BILITOTAL 0.3 09/27/2024     OTHER:   Lab Results   Component Value Date    PH 7.47 (H) 09/20/2024    LACT 0.8 09/24/2024    A1C 5.4 09/18/2024    PRANAV 7.9 (L) 09/30/2024    PHOS 3.0 09/30/2024    MAG 1.8 09/30/2024    LIPASE 28 12/30/2023    TSH 2.45 " 09/04/2024    T4 1.49 12/30/2023       Anesthesia Plan    ASA Status:  3    NPO Status:  NPO Appropriate    Anesthesia Type: MAC.     - Reason for MAC: straight local not clinically adequate   Induction: Intravenous.   Maintenance: N/A.        Consents    Anesthesia Plan(s) and associated risks, benefits, and realistic alternatives discussed. Questions answered and patient/representative(s) expressed understanding.     - Discussed:     - Discussed with:  Patient      - Extended Intubation/Ventilatory Support Discussed: No.      - Patient is DNR/DNI Status: No     Use of blood products discussed: No .     Postoperative Care            Comments:               Tamiko Chawla MD    I have reviewed the pertinent notes and labs in the chart from the past 30 days and (re)examined the patient.  Any updates or changes from those notes are reflected in this note.

## 2024-09-30 NOTE — INTERIM SUMMARY
[facility] Pre-Admission Screen    Referral Source:  [facility] Cardiac Echo Pool Room  Admit date to referring facility: 8/30/2024    Physical Medicine and Rehab Consult Completed: No    Rehab Diagnosis:    09 Cardiac: s/p Heartmate III LVAD placement in setting of acute on chronic systolic heart failure d/t ischemic cardiomyopathy    Justification for Acute Inpatient Rehabilitation  Duane C Johnson is a 74 year old male with a PMH of HFrEF 2/2 ICM with LVEF 20-30%, atrial fibrillation, 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 Due to worsening functional limitations due to heart failure, patient was worked up for VAD while inpatient. Patient is now s/p implantation of HeartMate 3 LVAD on 9/18/2024. He returned to OR on POD 0 for mediastinal re-exploration, mediastinal clot evacuation, and chest washout. His post-operative course was complicated by Afib, pain, anxiety, insomnia, and transient right eye visual deficit. He has also required medical mgmt of his acute blood loss anemia, anticoagulation, diuresis, and hyponatremia. Patient also w/ B pleural effusions, requiring L thoracentesis w/ 1L removed on 10/1 and R thoracentesis w/ 1 L removed on 10/3/2024.  He is now medically stable and ready to discharge to acute inpt rehab for his ongoing medical mgmt, LVAD training, and multidisciplinary rehab needs.   At baseline, Duane is independent w/ his mobility and ADLs. Currently, he is requiring Ax1-2 for mobility and ADLs. He requires an intensive inpatient rehab program to address the following acute impairments: B LE edema, impaired activity tolerance, impaired balance, impaired strength, fatigue, pain, impaired safety awareness, impaired cognition, new post-surgical sternal and abdominal precautions. These impairments are  contributing to functional limitations impacting his safety and functional independence w/ bed mobility, transfers, gait, stairs, ADLs, IADLs, as well as his ability to safely manage his new LVAD. The patient requires transfer to [facility] for intensive therapies not available in a lesser level of care including PT/OT/SLP/edema, ongoing medical management at least 3 days per week by PMR and Hospitalist providers, and rehabilitative nursing care to optimize his medical and functional recovery in order to return home and to mitigate risk for clinical complications and readmission to the hospital.     Current Active Medical Management Needs/Risks for Clinical Complications  The patient requires the high level of rehabilitation physician supervision that accompanies the provision of intensive rehabilitation therapy.  The patient needs the services of the rehabilitation physician to assess the patient medically and functionally and to modify the course of treatment as needed to maximize the patient's capacity to benefit from the rehabilitation process.  The patient requires physician oversight at least 3x/wk to medically manage and assess:  Cardiac: pt w/ complex cardiac needs in setting of HFrEF 2/2 ICM and cardiogenic shock - now s/p Heartmate III LVAD implant (9/18/24), Afib requiring cardioversion 9/5/24 and 9/30/24, HTN and HLD. Assess for stable CV response w/ increased levels of activity, MAP goal 65-85. Currently on Atorvastatin, Hydralazine, Empagliflozin, Amiodarone, Losartan - pt requires assessment and titration medications as indicated.  Assess need for diuresis - PO Bumex.  Manage anticoagulation - Warfarin w/ INR goal 2-3. INR 2.55 on 10/4/2024. Provide patient and family education on new LVAD w/ further LVAD training needs anticipated to ensure pt and family safely able to manage LVAD upon discharge. Reinforce post-surgical sternal and abdominal precautions. Strict I/O, daily standing weights in setting of  heart failure.   Pulmonary: pt w/ B pleural effusions, requiring L thoracentesis on 10/1 and R thoracentesis on 10/3. Pleural effusions place pt at risk for shortness of breath and hypoxia. Currently stable on room air, assess SpO2 w/ increased levels of activity as pt w/ reports of shortness of breath w/ activity.   Integument: assess sternal incision and driveline site for healing and s/s of infection or impaired wound healing. Perform driveline dressing changes and instruct pt and family on sterile dressing changes. Assess BLE skin integrity in pt w/ 2+ edema in BLEs and use of B gradient compressive bandages.   Pain: Pt w/ acute post-operative pain. Patient will need ongoing assessment and adjustment of pain medications for optimal participation in therapies. Currently managed on Acetaminophen, Oxycodone, Robaxin, and Gabapentin.   /Renal/FE: assess renal function, fluid, and electrolyte balance in setting of hyponatremia, hypervolemia and need for diuresis. Currently on Bumex and Empagiflozin. Monitor daily BMP. Baseline Cr 0.9-1.  Acute blood loss anemia: Hgb 8.4. Iron studies suggestive of RADHA - received IV venofer x5 days. Anemia places pt at risk for fatigue, weakness, falls, impaired endurance, shortness of breath.   Nutrition/Oral Health: pt on mechanical/dental soft diet as pt recently had teeth extracted (he does not wear dentures). To optimize oral health during hospitalization and reduce risk of HAIs: Brush soft tissues with soft-bristled toothbrush and tongue brush, and at least BID (ideally up to QID). Swish-and-spit with CHX as prescribed. Swish-and-spit with Biotene as needed to alleviate dry mouth and promote good oral hygiene. Pt states dry mouth is not bothering him at this time. Frequent application of Vaseline to lips.   Mental health: pt at risk for exacerbation of mood and sleep disorders w/ prolonged hospitalization. Pt has required mgmt of his anxiety and insomnia - currently on Lexapro  and Melatonin. Promote effective coping strategies and adjustment to new body image and lifestyle changes needed in setting of new LVAD. Pt would benefit from Health Psychology consult to assist w/ mgmt of mental health needs.   Prophylaxis: pt at risk for stress ulcers - on Pantoprazole 40 mg daily for 30 days post-operatively.   The patient is at risk for DVT in setting of Afib, recent cardiac surgery, LVAD, impaired mobility - currently on anticoagulation.  The patient is at risk for falls d/t weakness, impaired balance, impaired cognition, and gait instability.     Past Medical/Surgical History  Surgery in the past 100 days: Yes  Additional relevant past medical history: anterior STEMI s/p PCI to the pLAD on 10/26/23 with a VT/VF arrest the next day (10/27/23) , HFrEF, mod-severe mitral valve regurgitation, Afib, CAD, HTN, V fib, s/p ICD 5/6/2024    Level of Functioning Prior to Admission:  LIVING ENVIRONMENT  People in Home: spouse  Current Living Arrangements: house  Home Accessibility: stairs within home, stairs to enter home  Number of Stairs, Main Entrance: 3  Stair Railings, Main Entrance: railings safe and in good condition  Number of Stairs, Within Home, Primary: greater than 10 stairs  Stair Railings, Within Home, Primary: railings safe and in good condition  Transportation Anticipated: family or friend will provide  Living Environment Comments: Ind PLOF, no AD, has a stair lift but does not need to use it, 3 GIAN    SELF-CARE  Usual Activity Tolerance: moderate  Regular Exercise: No  Equipment Currently Used at Home: none  Activity/Exercise/Self-Care Comment: Pt notes he has been having less stability when gazing up at stars at night and feels need for his walking stick to maintain his balance during this. Overall pt does not typically use it. Pt enjoys studying engineering, physics, and electronics.     Level of Function: GG Scale (Section GG Functional Ability and Goals; CMS's FLETCHER Version 3.0 Manual  effective 10.1.2019):  PT Current Function Goals for Rehab   Bed Rolling 4 Supervision or touching assistance 6 Independent   Supine to Sit 4 Supervision or touching assistance 6 Independent   Sit to Stand 1 Dependent 6 Independent   Transfer 1 Dependent 6 Independent   Ambulation 1 Dependent 6 Independent   Stairs 88 Not attempted due to safety 6 Independent     OT Current Function Goals for Rehab   Feeding 5 Setup or clean-up assistance 6 Independent   Grooming 4 Supervision or touching assistance 6 Independent   Bathing Not completed 6 Independent   Upper Body Dressing 2 Substantial/maximal assistance 6 Independent   Lower Body Dressing 3 Partial/moderate assistance 6 Independent   Toileting 4 Supervision or touching assistance 6 Independent   Toilet Transfer 4 Supervision or touching assistance 6 Independent   Tub/Shower Transfer Not completed 6 Independent   Cognition Impaired: 17/30 on SLUMS Supervision     SLP Current Function Goals for Rehab   Swallow Not Impaired Not applicable   Communication Not Impaired Not applicable     Current Diet:  0-Thin and 6-Soft and bite sized    Summary Statement:  Duane Johnson is a 74 year old male admitted w/ end stage refractory ischemic cardiomyopathy, who is now s/p Heartmate III LVAD placement on 9/18/2024. He is medically complex and requires physician oversight by PMR and Hospitalist providers to actively manage his medical and rehab needs. He also ongoing skilled rehab nursing cares and pt/family LVAD training to ensure he can safely manage his LVAD upon discharge. He has now been hospitalized for 36 days and has skilled PT/OT/SLP/edema needs. Currently he is currently requiring Ax2 to perform STS tranfers d/t proximal LE weakness and inability to use UEs for support in setting of his post-surgical sternal precautions. He is ambulating 100 ft w/ WW w/ CGA + w/c follow d/t fatigue and dyspnea on exertion w/ drop in LVAD PIs noted. He scored 17/30 on SLUMS on 10/3/2024  w/ deficits noted in areas of executive function, short term memory, recall, and visual-spatial. He would benefit from full cognitive linguistic assessment while admitted to [facility]. Pt also requiring use of gradient compression bandaging to assist w/ mgmt of BLE edema. He requires the coordinated, interdisciplinary care at acute rehab to optimize his medical and functional recovery in order to disch home w/ his wife providing 24/7 supervision and ongoing OP cardiac rehab.     Expected Therapies and Services Required During Inpatient Rehab Admission  Intensity of Therapy: Patient requires intensive therapies not available in a lesser level of care. Patient is motivated, making gains, and can tolerate 3 hours of therapy a day.  Physical Therapy: 60 minutes per day, 6 days a week for 12 days  Occupational Therapy: 60 minutes per day, 6 days a week for 12 days  Speech and Language Therapy: 60 minutes per day, 6 days a week for 12 days  Rehabilitation Nursing Needs: Patient requires 24 hour Rehab Nursing to manage bowel program, vitals, medication education, carryover of new rehab techniques, care coordination, skin integrity, pain management, post-surgical incision care to promote healing and prevent infection, provide safe environment for patient at falls risk, and edema management.    Precautions/restrictions/special needs:  Precautions: fall precautions, abdominal precautions, and Sternal precautions  Restrictions: no pushing, pulling, lifting > 10# w/ BUEs for at least 8 weeks post-surgery  Special Needs: LVAD    Expected Level of Improvement: Anticipate w/ intensive PT/OT, skilled rehab nursing cares, and medical mgmt by PMR and HOSP providers, pt will achieve a level of mod IND for bed mobility, transfers, gait, stairs, ADLs, and be able to safely and independently manage his Heartmate III LVAD.   Expected Length of time to achieve: 12 days    Anticipated Discharge Needs:  Anticipated Discharge Destination:  Home  Anticipated Discharge Support: Family member  24/7 support available : Yes  Identified caregiver(s):  wife  Anticipated Discharge Needs: Home with outpatient therapy, Outpatient Cardiac/Pulmonary Rehab, and Follow-up with LVAD Coordinator    Identified challenges/barriers: none.     Liaison signature/date/time: OLIVIA Marino/L, CM 10/5/24    Physician statement of review and agreement:  I have reviewed and am in agreement of the need for IRF stay to address above functional and medical needs. In addition to above statements address, Patient requires intensive active and ongoing therapeutic intervention and multiple therapies; Patient requires medical supervision; Expected to actively participate in the intensive rehab program; Sufficiently stable to actively participate; Expectation for measurable improvement in functional capacity or adaption to impairments.    MD signature/date/time:

## 2024-09-30 NOTE — PROGRESS NOTES
The patient's HeartMate LVAD was interrogated 9/30/2024  * Speed 5200 rpm   * Pulsatility index 5.9   * Power 3.3 Dahl   * Flow 3.1 L/minute   Fluid statu s: near euvolemic to mild hypervolemia   Alarms were reviewed, and notable for rare pi events, no alarms.   The driveline exit site was inspected, c/d/i.   All external components were inspected and showed no evidence of damage or malfunction, none replaced.   No changes to VAD settings made

## 2024-10-01 ENCOUNTER — APPOINTMENT (OUTPATIENT)
Dept: PHYSICAL THERAPY | Facility: CLINIC | Age: 74
DRG: 001 | End: 2024-10-01
Attending: NURSE PRACTITIONER
Payer: MEDICARE

## 2024-10-01 ENCOUNTER — APPOINTMENT (OUTPATIENT)
Dept: CARDIOLOGY | Facility: CLINIC | Age: 74
DRG: 001 | End: 2024-10-01
Attending: NURSE PRACTITIONER
Payer: MEDICARE

## 2024-10-01 ENCOUNTER — APPOINTMENT (OUTPATIENT)
Dept: GENERAL RADIOLOGY | Facility: CLINIC | Age: 74
DRG: 001 | End: 2024-10-01
Attending: STUDENT IN AN ORGANIZED HEALTH CARE EDUCATION/TRAINING PROGRAM
Payer: MEDICARE

## 2024-10-01 ENCOUNTER — APPOINTMENT (OUTPATIENT)
Dept: GENERAL RADIOLOGY | Facility: CLINIC | Age: 74
DRG: 001 | End: 2024-10-01
Attending: INTERNAL MEDICINE
Payer: MEDICARE

## 2024-10-01 ENCOUNTER — APPOINTMENT (OUTPATIENT)
Dept: OCCUPATIONAL THERAPY | Facility: CLINIC | Age: 74
DRG: 001 | End: 2024-10-01
Attending: NURSE PRACTITIONER
Payer: MEDICARE

## 2024-10-01 LAB
ANION GAP SERPL CALCULATED.3IONS-SCNC: 6 MMOL/L (ref 7–15)
ATRIAL RATE - MUSE: 67 BPM
BUN SERPL-MCNC: 15.2 MG/DL (ref 8–23)
CALCIUM SERPL-MCNC: 7.8 MG/DL (ref 8.8–10.4)
CHLORIDE SERPL-SCNC: 98 MMOL/L (ref 98–107)
CREAT SERPL-MCNC: 0.67 MG/DL (ref 0.67–1.17)
DIASTOLIC BLOOD PRESSURE - MUSE: NORMAL MMHG
EGFRCR SERPLBLD CKD-EPI 2021: >90 ML/MIN/1.73M2
ERYTHROCYTE [DISTWIDTH] IN BLOOD BY AUTOMATED COUNT: 16.8 % (ref 10–15)
GLUCOSE SERPL-MCNC: 84 MG/DL (ref 70–99)
HCO3 SERPL-SCNC: 25 MMOL/L (ref 22–29)
HCT VFR BLD AUTO: 24.1 % (ref 40–53)
HGB BLD-MCNC: 7.5 G/DL (ref 13.3–17.7)
INR PPP: 3.14 (ref 0.85–1.15)
INTERPRETATION ECG - MUSE: NORMAL
LVEF ECHO: NORMAL
MAGNESIUM SERPL-MCNC: 1.8 MG/DL (ref 1.7–2.3)
MCH RBC QN AUTO: 30 PG (ref 26.5–33)
MCHC RBC AUTO-ENTMCNC: 31.1 G/DL (ref 31.5–36.5)
MCV RBC AUTO: 96 FL (ref 78–100)
P AXIS - MUSE: 48 DEGREES
PHOSPHATE SERPL-MCNC: 3 MG/DL (ref 2.5–4.5)
PLATELET # BLD AUTO: 255 10E3/UL (ref 150–450)
POTASSIUM SERPL-SCNC: 4.1 MMOL/L (ref 3.4–5.3)
PR INTERVAL - MUSE: 232 MS
QRS DURATION - MUSE: 62 MS
QT - MUSE: 384 MS
QTC - MUSE: 405 MS
R AXIS - MUSE: 245 DEGREES
RBC # BLD AUTO: 2.5 10E6/UL (ref 4.4–5.9)
SODIUM SERPL-SCNC: 129 MMOL/L (ref 135–145)
SYSTOLIC BLOOD PRESSURE - MUSE: NORMAL MMHG
T AXIS - MUSE: 90 DEGREES
VENTRICULAR RATE- MUSE: 67 BPM
WBC # BLD AUTO: 7.5 10E3/UL (ref 4–11)

## 2024-10-01 PROCEDURE — 250N000013 HC RX MED GY IP 250 OP 250 PS 637: Performed by: PHYSICIAN ASSISTANT

## 2024-10-01 PROCEDURE — 250N000011 HC RX IP 250 OP 636: Performed by: INTERNAL MEDICINE

## 2024-10-01 PROCEDURE — 83735 ASSAY OF MAGNESIUM: CPT

## 2024-10-01 PROCEDURE — 97116 GAIT TRAINING THERAPY: CPT | Mod: GP

## 2024-10-01 PROCEDURE — 250N000013 HC RX MED GY IP 250 OP 250 PS 637: Performed by: NURSE PRACTITIONER

## 2024-10-01 PROCEDURE — 97110 THERAPEUTIC EXERCISES: CPT | Mod: GO

## 2024-10-01 PROCEDURE — 93306 TTE W/DOPPLER COMPLETE: CPT

## 2024-10-01 PROCEDURE — 97530 THERAPEUTIC ACTIVITIES: CPT | Mod: GP

## 2024-10-01 PROCEDURE — 97535 SELF CARE MNGMENT TRAINING: CPT | Mod: GO

## 2024-10-01 PROCEDURE — 999N000065 XR CHEST PORT 1 VIEW

## 2024-10-01 PROCEDURE — 250N000013 HC RX MED GY IP 250 OP 250 PS 637: Performed by: SURGERY

## 2024-10-01 PROCEDURE — 80048 BASIC METABOLIC PNL TOTAL CA: CPT | Performed by: NURSE PRACTITIONER

## 2024-10-01 PROCEDURE — 120N000003 HC R&B IMCU UMMC

## 2024-10-01 PROCEDURE — 85027 COMPLETE CBC AUTOMATED: CPT

## 2024-10-01 PROCEDURE — 0W9B3ZZ DRAINAGE OF LEFT PLEURAL CAVITY, PERCUTANEOUS APPROACH: ICD-10-PCS | Performed by: STUDENT IN AN ORGANIZED HEALTH CARE EDUCATION/TRAINING PROGRAM

## 2024-10-01 PROCEDURE — 32555 ASPIRATE PLEURA W/ IMAGING: CPT | Performed by: STUDENT IN AN ORGANIZED HEALTH CARE EDUCATION/TRAINING PROGRAM

## 2024-10-01 PROCEDURE — 71045 X-RAY EXAM CHEST 1 VIEW: CPT

## 2024-10-01 PROCEDURE — 71045 X-RAY EXAM CHEST 1 VIEW: CPT | Mod: 26 | Performed by: RADIOLOGY

## 2024-10-01 PROCEDURE — 93306 TTE W/DOPPLER COMPLETE: CPT | Mod: 26 | Performed by: INTERNAL MEDICINE

## 2024-10-01 PROCEDURE — 258N000003 HC RX IP 258 OP 636: Performed by: INTERNAL MEDICINE

## 2024-10-01 PROCEDURE — 84100 ASSAY OF PHOSPHORUS: CPT

## 2024-10-01 PROCEDURE — 99231 SBSQ HOSP IP/OBS SF/LOW 25: CPT | Mod: 24 | Performed by: NURSE PRACTITIONER

## 2024-10-01 PROCEDURE — 250N000013 HC RX MED GY IP 250 OP 250 PS 637: Performed by: INTERNAL MEDICINE

## 2024-10-01 PROCEDURE — 258N000003 HC RX IP 258 OP 636

## 2024-10-01 PROCEDURE — 250N000011 HC RX IP 250 OP 636: Performed by: STUDENT IN AN ORGANIZED HEALTH CARE EDUCATION/TRAINING PROGRAM

## 2024-10-01 PROCEDURE — 250N000013 HC RX MED GY IP 250 OP 250 PS 637

## 2024-10-01 PROCEDURE — 85610 PROTHROMBIN TIME: CPT

## 2024-10-01 RX ORDER — HYDRALAZINE HYDROCHLORIDE 50 MG/1
50 TABLET, FILM COATED ORAL ONCE
Status: COMPLETED | OUTPATIENT
Start: 2024-10-01 | End: 2024-10-01

## 2024-10-01 RX ORDER — LOSARTAN POTASSIUM 25 MG/1
25 TABLET ORAL 2 TIMES DAILY
Status: DISCONTINUED | OUTPATIENT
Start: 2024-10-01 | End: 2024-10-02

## 2024-10-01 RX ORDER — LOSARTAN POTASSIUM 25 MG/1
25 TABLET ORAL DAILY
Status: DISCONTINUED | OUTPATIENT
Start: 2024-10-01 | End: 2024-10-01

## 2024-10-01 RX ORDER — MAGNESIUM OXIDE 400 MG/1
400 TABLET ORAL EVERY 4 HOURS
Status: COMPLETED | OUTPATIENT
Start: 2024-10-01 | End: 2024-10-01

## 2024-10-01 RX ADMIN — MAGNESIUM OXIDE TAB 400 MG (241.3 MG ELEMENTAL MG) 400 MG: 400 (241.3 MG) TAB at 12:07

## 2024-10-01 RX ADMIN — ACETAMINOPHEN 975 MG: 325 TABLET ORAL at 15:38

## 2024-10-01 RX ADMIN — ATORVASTATIN CALCIUM 80 MG: 80 TABLET, FILM COATED ORAL at 20:25

## 2024-10-01 RX ADMIN — MAGNESIUM OXIDE TAB 400 MG (241.3 MG ELEMENTAL MG) 400 MG: 400 (241.3 MG) TAB at 08:42

## 2024-10-01 RX ADMIN — ACETAMINOPHEN 975 MG: 325 TABLET ORAL at 23:05

## 2024-10-01 RX ADMIN — PANTOPRAZOLE SODIUM 40 MG: 40 TABLET, DELAYED RELEASE ORAL at 08:41

## 2024-10-01 RX ADMIN — Medication 10 MG: at 23:04

## 2024-10-01 RX ADMIN — GABAPENTIN 100 MG: 100 CAPSULE ORAL at 23:04

## 2024-10-01 RX ADMIN — HYDRALAZINE HYDROCHLORIDE 50 MG: 50 TABLET ORAL at 04:57

## 2024-10-01 RX ADMIN — WARFARIN SODIUM 1.5 MG: 3 TABLET ORAL at 17:51

## 2024-10-01 RX ADMIN — ACETAMINOPHEN 975 MG: 325 TABLET ORAL at 08:41

## 2024-10-01 RX ADMIN — ESCITALOPRAM OXALATE 10 MG: 10 TABLET ORAL at 08:42

## 2024-10-01 RX ADMIN — IRON SUCROSE 200 MG: 20 INJECTION, SOLUTION INTRAVENOUS at 17:51

## 2024-10-01 RX ADMIN — METHOCARBAMOL TABLETS 750 MG: 750 TABLET, COATED ORAL at 20:25

## 2024-10-01 RX ADMIN — METHOCARBAMOL TABLETS 750 MG: 750 TABLET, COATED ORAL at 14:37

## 2024-10-01 RX ADMIN — SODIUM CHLORIDE, POTASSIUM CHLORIDE, SODIUM LACTATE AND CALCIUM CHLORIDE 500 ML: 600; 310; 30; 20 INJECTION, SOLUTION INTRAVENOUS at 04:56

## 2024-10-01 RX ADMIN — ACETAMINOPHEN 975 MG: 325 TABLET ORAL at 01:01

## 2024-10-01 RX ADMIN — HEPARIN, PORCINE (PF) 10 UNIT/ML INTRAVENOUS SYRINGE 10 ML: at 23:05

## 2024-10-01 RX ADMIN — LOSARTAN POTASSIUM 25 MG: 25 TABLET, FILM COATED ORAL at 20:25

## 2024-10-01 RX ADMIN — THERA TABS 1 TABLET: TAB at 08:41

## 2024-10-01 RX ADMIN — LOSARTAN POTASSIUM 25 MG: 25 TABLET, FILM COATED ORAL at 08:53

## 2024-10-01 RX ADMIN — OXYCODONE HYDROCHLORIDE 5 MG: 5 TABLET ORAL at 12:07

## 2024-10-01 RX ADMIN — METHOCARBAMOL TABLETS 750 MG: 750 TABLET, COATED ORAL at 08:41

## 2024-10-01 RX ADMIN — AMIODARONE HYDROCHLORIDE 200 MG: 200 TABLET ORAL at 08:41

## 2024-10-01 ASSESSMENT — ACTIVITIES OF DAILY LIVING (ADL)
ADLS_ACUITY_SCORE: 31

## 2024-10-01 NOTE — PROCEDURES
[facility]  CAPS PROCEDURE NOTE  Date of Admission:  8/30/2024  Consult Requested by: Cardiothoracic Surgery  Reason for Consult: management of symptomatic pleural effusion    Indication/HPI: SOB    Pre-Procedure Diagnosis: Left Pleural Effusion    Post-Procedure Diagnosis: Left Pleural Effusion    Risk Assessment: Higher bleeding risk. On warfarin for LVAD with INR 3.14    Procedure Outcome:  Therapeutic thoracentesis performed with 1 liters removed.   See additional procedure details below.    The primary covering service should follow up and address any lab results as appropriate.    MAURICIO ESPINOZA MD  [facility]  Securely message with Vocera (more info)  Text page via [employer] Paging/Directory   See signed in provider for up to date coverage information      [facility]    Thoracentesis at Bedside    Date/Time: 10/1/2024 10:34 AM    Performed by: Mauricio Espinoza MD  Authorized by: Mauricio Espinoza MD      UNIVERSAL PROTOCOL   Site Marked: Yes  Prior Images Obtained and Reviewed:  Yes  Required items: Required blood products, implants, devices and special equipment available    Patient identity confirmed:  Verbally with patient  NA - No sedation, light sedation, or local anesthesia  Confirmation Checklist:  Patient's identity using two indicators, relevant allergies, procedure was appropriate and matched the consent or emergent situation and correct equipment/implants were available  Time out: Immediately prior to the procedure a time out was called    Universal Protocol: the Joint Commission Universal Protocol was followed    Preparation: Patient was prepped and draped in usual sterile fashion    ESBL (mL):  1    Procedure purpose: therapeutic  Indications: pleural effusion       ANESTHESIA    Anesthesia:  Local infiltration  Local Anesthetic:  Lidocaine 1% without  epinephrine  Anesthetic Total (mL):  10      SEDATION    Patient Sedated: No      PROCEDURE DETAILS   Preparation: skin prepped with ChloraPrep  Ultrasound guidance: yes  Location: left posterior  Intercostal space: 8th  Puncture method: over-the-needle catheter  Needle gauge: 19.  Catheter size: 5F.  Number of attempts: 1  Drainage amount: 1000 ml  Drainage characteristics: serosanguinous  Chest x-ray performed: yes  Chest x-ray interpreted by: Pending.  Chest x-ray findings: Pending.      PROCEDURE    Patient Tolerance:  Patient tolerated the procedure well with no immediate complications  Length of time physician/provider present for 1:1 monitoring during sedation: 0        POC US GUIDE FOR THORACENTESIS     Impression  Limited chest ultrasound was performed and demonstrated an adequate fluid collection on the left hemithorax.    Thoracentesis Indication:pleural effusion    Doppler of the skin demonstrated an area at this site without significant vasculature.  A thoracentesis at this site was subsequently performed.    Ultrasound revealed normal lung sliding after the procedure.  LEDA ESPINOZA MD

## 2024-10-01 NOTE — PROGRESS NOTES
The patient's HeartMate LVAD was interrogated 10/1/2024  * Speed 5200 rpm   * Pulsatility index 5.3  * Power 3.3 Dahl   * Flow 3.3 L/minute   Fluid status: near euvolemic to mild hypervolemia   Alarms were reviewed, and notable for rare pi events, no alarms.   The driveline exit site was inspected, c/d/i.   All external components were inspected and showed no evidence of damage or malfunction, none replaced.   No changes to VAD settings made

## 2024-10-01 NOTE — PROGRESS NOTES
Neuro: A&Ox4.   Cardiac: SR. VSS. Mean 82, flows 3's  Respiratory: Sating adequate on RA.  GI/: Adequate urine output. BM X1 during days  Diet/appetite: Tolerating mechanical soft diet. Eating well.  Activity:  Assist of one, up to chair and in halls.  Pain: At acceptable level on current regimen.   Skin: No new deficits noted.  LDA's:DL picc, pivx2    Plan: Continue with POC. Notify primary team with changes.

## 2024-10-01 NOTE — PROGRESS NOTES
[facility]   Cardiology II Service / Advanced Heart Failure  Daily Consult Note      Patient: Duane C Johnson  MRN: [id number]  Admission Date: 8/30/2024  Hospital Day # 32    Assessment and Plan: Duane C Johnson is a 74 year old male pmhx of anterior STEMI s/p PCI to the pLAD on 10/26/23 with a VT/VF arrest the next day (10/27) with patent stents and unchanged anatomy on repeat angiogram. Placed on amiodarone and discharged 11/03/23, ICD was not indicated as this was within 48h of his MI. His EF prior to discharge was 20-30% with a large area of akinesis in the LAD, placed on apixaban due to this (Apixaban dcd on 7/5/24). Has had multiple admissions for HF exacerbation last year.  Admitted on 8/30/24 for CHF exacerbation with BNP 16k. CPX and RHC showed significant functional limitations due to heart failue. Underwent HM3 LVAD on 9/18/24.     Recommendations:  - Switch from lisinopril to 25 mg losartan BID for elevated MAPs, entresto ~$200/month  - Hold bumex for low flow alarms  - Aldosterone antagonist deferred while other medical therapy is prioritized  - Ongoing vad teaching, session today at 1000 with wife.  - speed opti echo    # Acute on chronic systolic heart failure secondary to ICM   # s/p HM3 LVAD as DT on 9/18/2024  # CAD s/p PCI  # History of STEMI  # s/p ICD 5/2024  Stage D. NYHA Class III.    Fluid status: Mild hypervolemia  ACEi/ARB/ARNi: Changed from lisinopril to 25 mg losartan BID due to elevated MAPs.   BB: Recent LVAD, will defer to outpatient  Aldosterone antagonist deferred while other medical therapy is prioritized  SGLT2i: Deferred while other medical therapy is prioritized  SCD prophylaxis: ICD  MAP: goal 65-85, current MAPs   LDH trends: 271, stable  Anticoagulation: warfarin dosing per pharmacy, INR goal 2-3, today 3.14  Antiplatelet: ASA not indicated in LVAD population, > 6 months s/p STEMI  Limited TTE 9/27 with LVIDd 4.0 cm, Closed AoV, no AI, moderately reduced  RV, normal IVC. Multiple low flow alarms overnight, received 50 mg hydralazine PO and 500 ml LR.     # Low flow alarms  # HTN  Multiple low flow alarms overnight, most likely related to elevated MAPs.   - Speed optimization echo ordered.   - 500 mL LR and 50 mg PO hydralazine given overnight  - Switch from lisinopril 2.5 mg to losartan 25 mg BID for MAPs.     # History of VT/VF arrest 2023  # AFib s/p DCCV 9/2024 and again 9/30/2024  - Successfully cardioverted in early September for AF. Since then EKG suggested AF on 9/21. Tele is only available from as early as 9/22. The patient was started on hep gtt 9/21, but unclear if was in AF before this. While the patient was on hep gtt until INR was therapeutic, it is not possible to assess rhythm prior to 9/21. Systemic AC was off on 9/18-9/20. S/p MELBA and DCCV on 9/30 with conversion to sinus rhythm   - continue amiodarone 200 mg daily   - Continue AC as above    # Visual changes. Stoke code call 9/29 for visual changes - right eye with decreased upper half of vision and bluish haze. Resolved after 30 minutes. Seen by opthalmology and neuro. Negative head CT and CTA head and neck. He has had 3 episodes which last about 5 minutes before resolving completely.   - Ophthalmology and Neuro, signed off  - Already on A/C  - Continue to monitor for changes    # Hyponatremia hypovolemia   Na 129, ranging 128-133  - encourage PO intake  - Hold bumex for low flow alarm. Consider resuming if sodium continues to drop.   - daily BMP    # Anemia  - Hgb 7.5 10/1.No overt bleeds, slow downtrend since surgery. LDH downtrending.   - Iron studies suggestive of RADHA. Started on IV venofer x5 days    # Bilateral pleural effusions  - Chest tube removed yesterday.   - Left thoracentesis done today, 1 L removed, R thoracentesis tomorrow per CV surgery.       Time/Communication  I spent 45 minutes reviewing results, care team notes, meeting directly with the patient, coordinating care, and  "completing documentation on the day of service.     -Nellie Purvis NP Student    [facility] Cardiology  Advanced Heart Failure    Jody Moreira DNP, NP-C  Nurse Practitioner - Advanced Heart Failure/Cardiology II Service  [place] preferred or Pager [phone]    ================================================================    Subjective/24-Hr Events:   No acute events overnight. No lightheadedness or dizziness. No pre-syncope or syncope. No LE edema. No abdominal edema. No sternal concerns. He does hae some pain in his L lower rib where he just received  a thoracentesis.     ROS:  4 point ROS including respiratory, CV, GI and  (other than that noted in the HPI) is negative.     Medications: Reviewed in EPIC.     Physical Exam:   BP 99/72   Pulse 83   Temp 98  F (36.7  C) (Oral)   Resp 18   Ht 1.676 m (5' 5.98\")   Wt 70.6 kg (155 lb 9.6 oz)   SpO2 94%   BMI 25.13 kg/m      GENERAL: Appears comfortable, in no distress, just had thoracentesis done.   HEENT: Eye symmetrical, no discharge or icterus bilaterally.  NECK: Supple,   CV: + LVAD hum  RESPIRATORY: Respirations regular, even, slightly labored. Lungs CTA throughout.    GI: Soft and non distended with normoactive bowel sounds present in all quadrants. No tenderness, rebound, guarding.   EXTREMITIES: No BLE peripheral edema. All extremities are warm and well perfused.  NEUROLOGIC: Alert and interacting appropriatly. No focal deficits.   SKIN: No jaundice. No rashes or lesions. Sternum c/d/i    Labs:  CMP  Recent Labs   Lab 10/01/24  0427 09/30/24  0414 09/29/24  0458 09/28/24  0450 09/27/24  0441 09/26/24  0504 09/25/24  0733 09/25/24  0520   * 131* 130* 129* 128* 129*  --  130*   POTASSIUM 4.1 4.3 4.3 4.8 4.3 4.4  --  4.2   CHLORIDE 98 98 99 98 97* 98  --  101   CO2 25 24 24 25 25 24  --  26   ANIONGAP 6* 9 7 6* 6* 7  --  3*   GLC 84 85 88 93 91 83   < > 90   BUN 15.2 17.1 15.9 17.7 20.1 26.6*  --  27.5*   CR 0.67 0.63* 0.64* 0.66* 0.64* 0.63*  " --  0.64*   GFRESTIMATED >90 >90 >90 >90 >90 >90  --  >90   PRANAV 7.8* 7.9* 7.9* 7.8* 7.8* 7.6*  --  7.8*   MAG 1.8 1.8 1.8 1.8 1.9 2.0  --  2.0   PHOS 3.0 3.0 3.0 3.1 3.0 2.9  --  3.1   PROTTOTAL  --   --   --   --  5.3* 5.3*  --  5.3*   ALBUMIN  --   --   --   --  2.4* 2.4*  --  2.3*   BILITOTAL  --   --   --   --  0.3 0.3  --  0.4   ALKPHOS  --   --   --   --  76 79  --  68   AST  --   --   --   --  34 34  --  23   ALT  --   --   --   --  28 29  --  15    < > = values in this interval not displayed.       CBC  Recent Labs   Lab 10/01/24  0427 09/30/24  0414 09/29/24  0458 09/28/24  0450   WBC 7.5 9.3 8.9 9.0   RBC 2.50* 2.63* 2.61* 2.63*   HGB 7.5* 7.9* 7.9* 7.9*   HCT 24.1* 25.4* 24.6* 25.0*   MCV 96 97 94 95   MCH 30.0 30.0 30.3 30.0   MCHC 31.1* 31.1* 32.1 31.6   RDW 16.8* 16.7* 16.6* 16.6*    259 260 250       INR  Recent Labs   Lab 10/01/24  0427 09/30/24  0414 09/29/24  0458 09/28/24  0450   INR 3.14* 3.08* 3.39* 3.00*

## 2024-10-01 NOTE — PROGRESS NOTES
VAD coordinator present for VAD speed optimization echo at bedside.   VAD Coordinator adjusted speed, monitored VAD parameters and EKG, LV size, patient tolerance, and recorded findings according to Speed Optimization protocol. See Speed Optimization sheet for full results.     Parameters pre-optimization: Speed: 5200 rpm, Flow: 3.4 lpm, PI: 4.1, Power: 3.4 jackson  Speed range evaluated: 5000 - 5200 rpm's. Protocol guidelines followed. Jody Moreira NP updated with study values.

## 2024-10-01 NOTE — PROGRESS NOTES
Post-VAD Social Work Services Progress Note    Date of Initial Social Work Evaluation: 09/05/2024  Collaborated with: CVTS, [facility] Liaison     Data: Duane was evaluated for LVAD and is remaining inpatient until he receives his VAD implant. He continues to work with therapies and working towards increasing his nutrition prior to LVAD implant. Duane received his LVAD on 9/18/24. Patient was extubated on 9/20 and transferred to  on 9/24. Patient had chest tube removed on 9/24 and continues to work with therapies. Patient had chest tube removed yesterday. Patient had left thora today and plan for right thora tomorrow. PT is recommending ARU when medically ready for discharge. [facility] is following.     Intervention: SW collaborated with CVTS and [facility] Liaison to discuss medical readiness. Patient not medically ready to transition to ARU today. Estimated discharge remains to be 10/2.     Assessment: Patient not medically ready to discharge. Patient anticipated to be ready in the next 1-2 days.   Education provided by SW: discharge planning  Plan:    Discharge Plans in Progress: [facility]    Barriers to d/c plan: chest tube, medical readiness, new VAD    Follow up Plan: SW to continue to follow for any potential psychosocial concerns pre and post VAD implant.      Jessi Almendarez, MSW, LGSW, APSW  Heart Transplant/[facility]   Te[facility]  Ph. [phone]

## 2024-10-01 NOTE — PROCEDURES
[facility]    Procedure: *Cardioversion    Date/Time: 9/30/2024 11:40 AM    Performed by: Alonso Valentine MD  Authorized by: Alonso Valentine MD      UNIVERSAL PROTOCOL   Site Marked: NA  Prior Images Obtained and Reviewed:  Yes  Required items: Required blood products, implants, devices and special equipment available    Patient identity confirmed:  Verbally with patient, arm band and hospital-assigned identification number  Patient was reevaluated immediately before administering moderate or deep sedation or anesthesia  Confirmation Checklist:  Patient's identity using two indicators, relevant allergies, correct equipment/implants were available and procedure was appropriate and matched the consent or emergent situation  Time out: Immediately prior to the procedure a time out was called    Universal Protocol: the Joint Commission Universal Protocol was followed       ANESTHESIA    Anesthesia was administered and monitored by anesthesiology.  See anesthesia documentation for details.    SEDATION  Patient Sedated: Yes    Vital signs: Vital signs monitored during sedation      PROCEDURE DETAILS  Cardioversion basis: elective  Pre-procedure rhythm: atrial fibrillation  Patient position: patient was placed in a supine position  Chest area: chest area exposed  Electrodes: pads  Electrodes placed: anterior-posterior  Number of attempts: 1    Details of Attempts:  1 synchronized shock delivered at 200J  Post-procedure rhythm: normal sinus rhythm  Complications: no complications      PROCEDURE  Describe Procedure: LVAD was connected to the  for monitoring. All parameters reviewed and monitored during the procedure until patient recovered from anesthesia.  Patient Tolerance:  Patient tolerated the procedure well with no immediate complications

## 2024-10-01 NOTE — CONSULTS
"Patient has Medicare D through [employer].  Patient's drug costs have exceeded $5,030, and as such will now pay a 25% coinsurance on all covered drugs.  (Also called the \"coverage gap\" or \"donut hole.\")      Entresto  --Upon receipt of RX, Discharge Pharmacy can provide 1 mo free.  --Subsequent fills will be $164/mo.  --If total out-of-pocket costs exceed $8000, patient will pay $0 for the remainder of 2024.    If this is cost-prohibitive, please reconsult and Liaison team can pursue a [facility] gerardo for cardiomyopathy copays.    Sunitha Roman  Pharmacy Technician/Liaison, Discharge Pharmacy   [phone] (voice or text)  [email or web address]  "

## 2024-10-01 NOTE — CONSULTS
Dental Hygiene Consult Service: Follow-up    Duane C Johnson MRN# [id number]   YOB: 1950 Age: 74 year old     Date of Admission: 8/30/2024   PCP is Jose Coles  Date of Service: 10/1/2024  Hospital Day #: 32         Reason for Consult:   Referring MD & Reason for Visit: I was asked by Michael Mulvihill, MD, to see Duane C Johnson for a LIMITED oral assessment regarding follow-up visit.     *Please note: dental hygiene services, including oral assessment, are not a replacement for examination by a licensed dentist. The patient has been informed of the oral assessment procedures and has provided verbal consent.       History of Present Illness:   This patient is a 74 year old male with a history of anterior [facility] with a VT/VF arrest the next day (10/27) with patent stents and unchanged anatomy on repeat angiogram. Placed on amiodarone and discharged 11/03/23, ICD was not indicated as this was within 48h of his MI. His EF prior to discharge was 20-30% with a large area of akinesis in the LAD, placed on apixaban due to this (Apixaban dcd on 7/5/24). Has had multiple admissions for HF exacerbation last year.  Admitted on 8/30/24 for CHF exacerbation with BNP 16k. CPX and RHC showed significant functional limitations due to heart failue. Underwent HM3 LVAD on 9/18/24. .  Dental and oropharyngeal history is pertinent for recent dental extractions.  The patient reports no pain from recent extraction sites, notices some 'bone' or hard 'stuff' that has been coming out occasionally, with no pain.         General Observations   Level of support Patient is fully independent   Patient currently in pain No   Patient wears dentures No   Current oral care routine Rinsing with the CHX as prescribed.    Patient has oral care products Mouthrinse   Extraoral assessment   General appearance Symmetrical and Normal TMJ function   Lymph nodes Symmetrical, no abnormalities, non-tender   Oral  Health Assessment Tool (OHAT)   Lips 0=Healthy: smooth, pink, moist   Tongue 1=Changes: (ie. patchy, fissured, red, coated ) - slightly coated, dry.    Gums and Tissues 1=Changes: (ie. dry, shiny, rough, red, swollen, one ulcer/sore spot (under dentures)) - healing post extractions. Several areas of granulation tissue noted. Slightly inflamed lingual of lower right attached gingiva, no pain reported by pt.    Saliva 1=Changes: (ie. dry, sticky tissues, little saliva present, resident thinks they have dry mouth )- slightly dry   Natural Teeth Yes/No 2=Unhealthy: (ie. 4+ decayed or broken teeth/roots, or very worn down teeth, or less than 4 teeth) - teeth have all been extracted   Dentures Yes/No Patient does not wear dentures   Oral Cleanliness 1=Changes:(ie. food particles/tartar/plaque in 1-2 areas of the mouth or on small area of dentures or halitosis (bad breath)) - some food particles on tongue, in vestibules.    Dental Pain 0=Healthy: no behavioural, verbal, or physical signs of dental pain   OHAT Total Score (0-16) 6   Other Dental Concerns None   Care provided during assessment Oral Assessment and Patient education   Follow up DH assessments required? No   Connect with [facility] or SOD care? No   Oral Care Plan To optimize oral health during hospitalization and reduce risk of HAIs:  Brush soft tissues with soft-bristled toothbrush and tongue brush, and at least BID (ideally up to QID)  Swish-and-spit with CHX as prescribed  Swish-and-spit with Biotene as needed to alleviate dry mouth and promote good oral hygiene. Pt states dry mouth is not bothering him at this time.  Frequent application of Vaseline to lips        Pt should establish/continue outpatient comprehensive dental care at clinic of choice under medical advisement following discharge. Gave information for [facility] to seek out-patient care and look into dentures.             Assessment and Plan:   Assessment: This patient is a 74 year old  male with a history of anterior STEMI s/p PCI to the pLAD on 10/26/23 with a VT/VF arrest the next day (10/27) with patent stents and unchanged anatomy on repeat angiogram. Placed on amiodarone and discharged 11/03/23, ICD was not indicated as this was within 48h of his MI. His EF prior to discharge was 20-30% with a large area of akinesis in the LAD, placed on apixaban due to this (Apixaban dcd on 7/5/24). Has had multiple admissions for HF exacerbation last year.  Admitted on 8/30/24 for CHF exacerbation with BNP 16k. CPX and RHC showed significant functional limitations due to heart failue. Underwent HM3 LVAD on 9/18/24. .  Dental and oropharyngeal history is pertinent for recent dental extractions.  The patient reports no pain from recent extraction sites, notices some 'bone' or hard 'stuff' that has been coming out occasionally, with no pain.  This is normal, and advised pt to let his care team know if he starts to have any pain, swelling, or new signs of infection.     Plan:   Implement oral care plan as described above.  Pt to continue comprehensive dental care at dental clinic of choice. Information below for the [facility] if pt would like to use as a resource:  [place] [address], [address]  Ph: [phone]    Nalini Morales [facility], [facility]  Message on Delphix or pager *[phone]    Past Medical History   I have reviewed this patient's medical history and updated it with pertinent information if needed.  Past Medical History:   Diagnosis Date    Benign essential hypertension     CAD (coronary artery disease)     Chronic systolic heart failure (H)     Hypertension     ST elevation myocardial infarction (STEMI), unspecified artery (H)     Ventricular fibrillation (H)        Past Surgical History   I have reviewed this patient's surgical history and updated it with pertinent information if needed.  Past Surgical History:   Procedure Laterality Date     ANESTHESIA CARDIOVERSION N/A 9/5/2024    Procedure: Anesthesia cardioversion;  Surgeon: GENERIC ANESTHESIA PROVIDER;  Location: [facility] OR    ANESTHESIA CARDIOVERSION N/A 9/30/2024    Procedure: Anesthesia cardioversion;  Surgeon: GENERIC ANESTHESIA PROVIDER;  Location: [facility] OR    COLONOSCOPY N/A 3/23/2023    Procedure: COLONOSCOPY, FLEXIBLE, WITH LESION REMOVAL USING SNARE;  Surgeon: Jose Faustin MD;  Location: [facility]    CV CENTRAL VENOUS CATHETER PLACEMENT N/A 10/26/2023    Procedure: Central Venous Catheter Placement;  Surgeon: Rob Lyles MD;  Location: [facility] CARDIAC CATH LAB    CV CORONARY ANGIOGRAM N/A 10/27/2023    Procedure: Coronary Angiogram;  Surgeon: Rob Lyles MD;  Location: [facility] CARDIAC CATH LAB    CV CORONARY ANGIOGRAM N/A 10/26/2023    Procedure: Coronary Angiogram;  Surgeon: Rob Lyles MD;  Location:  HEART CARDIAC CATH LAB    CV LEFT HEART CATH N/A 10/26/2023    Procedure: Left Heart Catheterization;  Surgeon: Rob Lyles MD;  Location: [facility] CARDIAC CATH LAB    CV PCI N/A 10/27/2023    Procedure: Percutaneous Coronary Intervention;  Surgeon: Rob Llyes MD;  Location:  HEART CARDIAC CATH LAB    CV PCI STENT DRUG ELUTING N/A 10/26/2023    Procedure: Percutaneous Coronary Intervention Stent;  Surgeon: Rob Lyles MD;  Location:  HEART CARDIAC CATH LAB    CV RIGHT HEART CATH MEASUREMENTS RECORDED N/A 5/6/2024    Procedure: Heart Cath Right Heart Cath;  Surgeon: Rob Lyles MD;  Location: [facility] CARDIAC CATH LAB    CV RIGHT HEART CATH MEASUREMENTS RECORDED N/A 9/3/2024    Procedure: Right Heart Catheterization;  Surgeon: Aaron Mesa MD;  Location: [facility] CARDIAC CATH LAB    EP ICD INSERT SINGLE N/A 5/8/2024    Procedure: Implantable Cardioverter Defibrillator Device & Lead Implant Dual;  Surgeon: Guero Black MD;  Location:  HEART CARDIAC CATH LAB    INJECTION, HYDROGEL SPACER N/A  2024    Procedure: INJECTION, HYDROGEL SPACER AND FIDUCIAL MARKER PLACEMENT;  Surgeon: Stanislaw Barros MD;  Location: [facility] OR    INSERT VENTRICULAR ASSIST DEVICE LEFT (HEARTMATE II) N/A 2024    Procedure: Median Sternotomy, INSERTION of LEFT VENTRICULAR ASSIST DEVICE (HEARTMATE III), cardiopulmonary bypass, transesophageal echocardiogram performed by anesthesia.;  Surgeon: Mulvihill, Michael, MD;  Location: [facility] OR    IRRIGATION AND DEBRIDEMENT CHEST WASHOUT, COMBINED Right 2024    Procedure: Exploration of chest, chest washout;  Surgeon: Mulvihill, Michael, MD;  Location: [facility] OR       Social History   I have reviewed this patient's social history and updated it with pertinent information if needed.  Social History     Tobacco Use    Smoking status: Former     Current packs/day: 0.00     Average packs/day: 0.3 packs/day for 30.0 years (7.5 ttl pk-yrs)     Types: Cigarettes     Start date: 10/26/1993     Quit date: 10/26/2023     Years since quittin.9     Passive exposure: Past    Smokeless tobacco: Never    Tobacco comments:     Quit 10/26/2023   Vaping Use    Vaping status: Never Used   Substance Use Topics    Alcohol use: Yes     Comment: 1-2 beers per day    Drug use: No     Prior to Admission Medications   Prior to Admission Medications   Prescriptions Last Dose Informant Patient Reported? Taking?   FISH OIL-VITAMIN D PO 2024 Spouse/Significant Other, Self Yes Yes   Sig: Take 1 capsule by mouth 2 times daily.   acetaminophen (TYLENOL 8 HOUR ARTHRITIS PAIN) 650 MG CR tablet PRN Self, Spouse/Significant Other Yes Yes   Sig: Take 650-1,300 mg by mouth every 8 hours as needed for pain.   amiodarone (PACERONE) 200 MG tablet 2024 Spouse/Significant Other, Self No Yes   Sig: Take 1 tablet (200 mg) by mouth daily   atorvastatin (LIPITOR) 80 MG tablet 2024 Spouse/Significant Other, Self No Yes   Sig: Take 1 tablet (80 mg) by mouth daily   bumetanide (BUMEX) 1 MG tablet PRN Self,  Spouse/Significant Other Yes Yes   Sig: Take 1 mg by mouth daily as needed (for weight increase or leg swelling).   cetirizine (ZYRTEC) 10 MG tablet 8/30/2024 Spouse/Significant Other, Self Yes Yes   Sig: Take 10 mg by mouth daily   clopidogrel (PLAVIX) 75 MG tablet 8/30/2024 Spouse/Significant Other, Self No Yes   Sig: Take 1 tablet (75 mg) by mouth daily   empagliflozin (JARDIANCE) 10 MG TABS tablet 8/29/2024 at On hold Spouse/Significant Other, Self No No   Sig: Take 1 tablet (10 mg) by mouth daily   lisinopril (ZESTRIL) 2.5 MG tablet 8/30/2024 Spouse/Significant Other, Self No Yes   Sig: Take 1 tablet (2.5 mg) by mouth daily HOLD for systolic blood pressure < 90   magnesium oxide (MAG-OX) 400 MG tablet 8/30/2024 Spouse/Significant Other, Self No Yes   Sig: Take 1 tablet (400 mg) by mouth daily   melatonin 5 MG tablet 8/29/2024 Spouse/Significant Other, Self No Yes   Sig: Take 1-2 tablets (5-10 mg) by mouth at bedtime   metoprolol succinate ER (TOPROL XL) 25 MG 24 hr tablet 8/29/2024 Spouse/Significant Other, Self No Yes   Sig: Take 0.5 tablets (12.5 mg) by mouth daily Hold for systolic blood pressure less than 90.   multivitamin w/minerals (THERA-VIT-M) tablet 8/30/2024 Spouse/Significant Other, Self Yes Yes   Sig: Take 1 tablet by mouth 2 times daily   potassium chloride tray ER (KLOR-CON M20) 20 MEQ CR tablet 8/29/2024 Spouse/Significant Other, Self No Yes   Sig: TAKE ONE TABLET BY MOUTH ONCE DAILY   vitamin C (ASCORBIC ACID) 1000 MG TABS 8/30/2024 Spouse/Significant Other, Self Yes Yes   Sig: Take 1,000 mg by mouth 2 times daily.      Facility-Administered Medications: None     Allergies   Allergies   Allergen Reactions    Brilinta [Ticagrelor] Other (See Comments) and Difficulty breathing     Per pt and spouse, hyperventilation.    Clonazepam Other (See Comments)     Per spouse, acted like a zombie and he was shaky, could barely talk.     Physical Exam

## 2024-10-01 NOTE — PROGRESS NOTES
Multiple low flow alarms starting at approximately 04:00.  Flows hovering around 2.4 - 2.8 with PI's 7 - 9.  Doppler MAP currently 110.  Dr. Chavarria notified and currently at bedside.  New orders placed for 50mg hydralazine PO and 500cc LR.  Patient also NPO for possible therapeutic thoracentesis today.  LVAD teach today with his wife.

## 2024-10-01 NOTE — PROGRESS NOTES
Cardiovascular Surgery Progress Note  10/01/2024         Assessment and Plan:     Duane C Johnson is a 74 year old male with a PMH of  HFrEF 2/2 ICM with LVEF 20-30%, atrial fibrillation, 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 Due to worsening functional limitations due to heart failure, patient was worked up for VAD while inpatient. Patient is now s/p implantation of HeartMate 3 LVAD on 9/18/2024 with Dr. Mulvihill. Returned to OR on POD 0 for mediastinal re-exploration, mediastinal clot evacuation, and chest washout.     Cardiovascular:   HFrEF 2/2 ICM - s/p HM3 LVAD implant 9/18/24  Cardiogenic shock, improved  Hx afib s/p DCCV 9/5/24 and 9/30/24  Hx VT/VF arrest with ICD in place (5/8/24)  Hx STEMI, CAD s/p PCI to LAD (2023)  HTN  HLD  High MAPs overnight with some flow alarms, in NSR  Preop echo showed LV EF 10-15%  Postop echo 9/27 showed LVAD cannula seen in the expected anatomic position in the LV apex, normal flow velocities, moderately reduced RV function, normal IVC   - ASA not indicated  - Atorvastatin 80 mg daily  - PTA amiodarone 200 mg daily  - losartan 25 mg daily with plans to start entresto when ACEi washes out  - Cards 2 following for heart failure optimization, appreciate assistance  - S/p MELBA/DCCV 9/30 to NSR     Chest tubes: all removed    Pulmonary:  - Extubated POD 2; now saturating well on RA   - Pulm toilet, IS, OOB/activity and deep breathing  Right pleural effusion  Left pleural effusion  - s/p left thora on 10/1 for 1 L removed, plan for right thora on 10/2    Neurology / MSK:  Acute post-operative pain  - Multimodal analgesia with acetaminophen, oxycodone PRN, Robaxin PRN, HS gabapentin  Anxiety  - PTA Lexapro resumed  S/p sternotomy   - PT/OT/CR consults   - Sternal precautions  Insomnia  - melatonin increased from 5 mg to 10 mg HS 9/28  Transient right eye visual deficit   -  9/29 patient reported transient discoloration in the top half of the visual field in his right eye, no additional neurologic deficits noted  - neurology consulted - recommend CT head and CTA head/neck to rule out stroke (patient can't get MRI due to LVAD); not a TPA candidate   - CT head and CTA head/neck without acute pathology/significant stenosis  - ophthalmology consulted - no evidence of CRAO. Most likely diagnosis is atypical amaurosis vs possible impending CRVO, agree with CT head/CTA head neck  -  Patient will need non-urgent follow up in ophthalmology clinic for repeat dilated exam, OCT macula each eye, and IVFA with transit right eye      / Renal / FE:  Hyponatremia  Hx prostate cancer s/p Leuprolide/radiation therapy   - Baseline creatinine 0.9-1  - Diuresis: Per cards, bumex held today  - lymphedema wraps ordered 9/28  - Replace electrolytes prn per protocol  - Strict I/O, daily standing weights per protocol  - Completed Leuprolide on 6/11/24 and radiation therapy on 6/27/24. Last PSA was <0.01 on 7/29/24   - Daily BMP    GI / Nutrition:   - Mechanical/dental soft diet, continue bowel regimen    Endocrine:  Stress-induced hyperglycemia, resolved  Hgb A1c 5.4%    - Initially managed on insulin drip postop, transitioned to sliding scale; discontinued POCT and sliding scale given maintenance of euglycemia without need for supplemental insulin    Infectious Disease:  Stress-induced leukocytosis, resolved  - WBC WNL, afebrile, no signs or symptoms of infection  - Completed perioperative antibiotics    Hematology:   Acute blood loss anemia  Thrombocytopenia, resolved  Warfarin AC for LVAD  Hgb stable; Plt WNL, no signs or symptoms of active bleeding    Anticoagulation:   - Coumadin for LVAD, INR goal 2-3. Most recent INR mildly supra therapeutic at 3.14  - LIH discontinued    Prophylaxis:   - Stress ulcer prophylaxis: Pantoprazole 40 mg daily for 30 days post-operatively  - DVT prophylaxis: warfarin AC, SCD,  "OOB/activity    Disposition:   - Transferred to  on   - Therapies recommending discharge to ARU   - Medically Ready for Discharge: Anticipated tomorrow pending right thoracentesis, speed optimization     Discussed with Dr. Mulvihill.     NADEGE Tyler, [facility]-, CCRN  Nurse Practitioner  Cardiothoracic Surgery  Pager: [phone]            Interval History:     No acute events overnight. States pain is well managed on current regimen, slept well. Breathing is stable on room air, working with IS. Tolerating diet, is passing flatus, +BM, no n/v. Ambulating in the halls with assistance. Denies chest pain, palpitations, dizziness, syncopal symptoms, chills, myalgias, sternal popping/clicking.           Physical Exam:   Blood pressure 99/72, pulse 83, temperature 98  F (36.7  C), temperature source Oral, resp. rate 18, height 1.676 m (5' 5.98\"), weight 70.6 kg (155 lb 9.6 oz), SpO2 94%.  Vitals:    24 0500 24 0400 10/01/24 0600   Weight: 72 kg (158 lb 11.2 oz) 72.1 kg (159 lb) 70.6 kg (155 lb 9.6 oz)        Gen: In bed, NAD  Neuro: A&Ox4, intact, no focal deficits  CV: RRR, NSR on monitor, LVAD hum  Pulm: CTA room air  Abd: nondistended, non-tender  Ext: 1+ peripheral edema bilaterally, wraps in place  Incision: clean, dry, intact, no erythema or induration, sternum stable  Tubes/drain sites: dressing clean and dry  Lines: driveline dressing clean and dry     Heartmate 3 LEFT VS  Flow (Lpm): 3 Lpm  Pulse Index (PI): 6.8 PI  Speed (rpm): 5200 rpm  Power (jackson): 3.4 jackson  Current Hct settin     Clinically Significant Risk Factors         # Hyponatremia: Lowest Na = 129 mmol/L in last 2 days, will monitor as appropriate      # Hypoalbuminemia: Lowest albumin = 2.2 g/dL at 2024  3:18 AM, will monitor as appropriate        # End stage heart failure: Ventricular assist device (VAD) present          # Overweight: Estimated body mass index is 25.13 kg/m  as calculated from the following:    " "Height as of this encounter: 1.676 m (5' 5.98\").    Weight as of this encounter: 70.6 kg (155 lb 9.6 oz).   # Moderate Malnutrition: based on nutrition assessment      # Financial/Environmental Concerns: none   # ICD device present            Data:    Imaging:  reviewed recent imaging    Recent Results (from the past 24 hour(s))   *Cardioversion    Narrative    Alonso Valentine MD     9/30/2024 11:53 PM  [facility]    Procedure: *Cardioversion    Date/Time: 9/30/2024 11:40 AM    Performed by: Alonso Valentine MD  Authorized by: Alonso Valentine MD      UNIVERSAL PROTOCOL   Site Marked: NA  Prior Images Obtained and Reviewed:  Yes  Required items: Required blood products, implants, devices and special   equipment available    Patient identity confirmed:  Verbally with patient, arm band and   hospital-assigned identification number  Patient was reevaluated immediately before administering moderate or deep   sedation or anesthesia  Confirmation Checklist:  Patient's identity using two indicators, relevant   allergies, correct equipment/implants were available and procedure was   appropriate and matched the consent or emergent situation  Time out: Immediately prior to the procedure a time out was called    Universal Protocol: the Joint Commission Universal Protocol was followed       ANESTHESIA    Anesthesia was administered and monitored by anesthesiology.  See   anesthesia documentation for details.    SEDATION  Patient Sedated: Yes    Vital signs: Vital signs monitored during sedation      PROCEDURE DETAILS  Cardioversion basis: elective  Pre-procedure rhythm: atrial fibrillation  Patient position: patient was placed in a supine position  Chest area: chest area exposed  Electrodes: pads  Electrodes placed: anterior-posterior  Number of attempts: 1    Details of Attempts:  1 synchronized shock delivered at 200J  Post-procedure rhythm: normal sinus rhythm  Complications: no " complications      PROCEDURE  Describe Procedure: LVAD was connected to the  for monitoring.   All parameters reviewed and monitored during the procedure until patient   recovered from anesthesia.  Patient Tolerance:  Patient tolerated the procedure well with no immediate   complications   CAR Transesophageal Echocardiogram   Result Value    LVEF  10-15%    [facility]    [id number]  [facility]  [id number]  [id number]     [facility],[place]  Echocardiography Laboratory  [address] [place] [address]     Name: JOHNSON, DUANE C  MRN: [id number]  : 1950  Study Date: 2024 09:31 AM  Age: 74 yrs  Gender: Male  Patient Location: [facility]  Reason For Study: Atrial Fibrillation  Ordering Physician: ISACC DIANA  Performed By: BEVERLEY Peralta MD     BSA: 1.8 m2  Height: 65 in  Weight: 158 lb  HR: 92  BP: 97/87 mmHg  ______________________________________________________________________________  Interpretation Summary  Poor acoustic windows.  The left atrial appendage is normal. It is free of spontaneous echo contrast  and thrombus.  ______________________________________________________________________________  Procedure  Transesophageal Echocardiogram with color and spectral Doppler performed. The  procedure was performed in the Echo Lab. Informed consent for Transesophegeal  echo obtained. MELBA Probe #68 was used during the procedure. Patient was  sedated using Fentanyl 50 mcg. Patient was sedated using Versed 1.5 mg. The  heart rate, respiratory rate, oxygen saturations, blood pressure, and response  to care were monitored throughout the procedure with the assistance of the  nurse. I determined this patient to be an appropriate candidate for the  planned sedation and procedure and have reassessed the patient immediately  prior to sedation and procedure. Total sedation time: 15 minutes of continuous  bedside 1:1 monitoring. The Transducer was inserted without  difficulty . The  patient tolerated the procedure well. Complications None. Poor acoustic  windows.     Left Ventricle  The visual ejection fraction is 10-15%.     Right Ventricle  Global right ventricular function is moderately reduced.     Atria  The atria cannot be assessed. The left atrial appendage is normal. It is free  of spontaneous echo contrast and thrombus. Small collapsed BRIE.     Aortic Valve  Aortic valve remain closed. Trace AI.     ______________________________________________________________________________  Report approved by: Eusebio Terry 09/30/2024 03:04 PM     ______________________________________________________________________________      POC US Guide for Thorcentesis    Narrative    Limited point of care pleural/lung ultrasound to evaluate for thoracentesis.    Thoracentesis Indication:pleural effusion     Views/Acquisition: Bilateral pleural space(s): upright.      Findings/Interpretation: Pleural effusions are present bilaterally, right > left. There is sufficient fluid for bedside thoracentesis if anticoagulation parameters are met.     Donald Deal MD     US Upper Extremity Venous Duplex Left    Narrative    EXAMINATION: DOPPLER VENOUS ULTRASOUND OF THE LEFT UPPER EXTREMITY,  9/30/2024 3:07 PM     COMPARISON: None.    HISTORY: Asymmetric swelling, assessing for DVT    TECHNIQUE:  Gray-scale evaluation with compression, spectral flow and  color Doppler assessment of the deep venous system of the left upper  extremity.    FINDINGS:  Left: Normal blood flow and waveforms are demonstrated in the internal  jugular, innominate, subclavian, and axillary veins. There is normal  compressibility of the brachial and cephalic veins. A short segment of  occlusive thrombus in the left basilic vein demonstrated in the  antecubital fossa.      Impression    IMPRESSION:  1.  No evidence of left upper extremity deep venous thrombosis.  2.  Short segment of occlusive superficial thrombus in the  left  basilic vein at the antecubital fossa.    PANCHITO EID MD         SYSTEM ID:  [id number]   XR Chest Port 1 View    Narrative    Exam: XR CHEST PORT 1 VIEW, 9/30/2024 3:24 PM    Indication: post CT pull    Comparison: Chest x-ray 9/30/2024    Findings:     Frontal projection radiograph of the chest. Interval removal of left  chest tube, otherwise support devices including left chest wall  cardiac device, left ventricular assist device and right upper  extremity PICC. Stable enlarged cardiac mediastinal silhouette. No  discernible pneumothorax. Similar hazy bibasilar opacities,; small  right effusion       Impression    Impression: Interval removal of left-sided chest tube, stable  additional support devices and pulmonary opacities; no discernible  pneumothorax.    SAPNA KAHN MD         SYSTEM ID:  [id number]   XR Chest Port 1 View    Narrative    Exam: XR CHEST PORT 1 VIEW, 10/1/2024 6:42 AM    Comparison: Radiograph 9/30/2024    History: chest tubes s/p LVAD insertion    Findings:  Portable AP view of the chest. Stable postsurgical changes of the  chest. Support devices project in stable position including left ICD,  right upper extremity PICC, and LVAD. Stable enlarged  cardiomediastinal silhouette. Aortic arch calcifications. No  appreciable pneumothorax. Persistent small right pleural effusion.  Similar hazy bibasilar opacities. Left costophrenic angle is obscured  by the LVAD.      Impression    Impression:   1. Stable support devices.  2. Persistent small right pleural effusion with similar hazy bibasilar  opacities.    I have personally reviewed the examination and initial interpretation  and I agree with the findings.    ALEC REILLY MD         SYSTEM ID:  [id number]   POC US GUIDE FOR THORACENTESIS    Impression    Limited chest ultrasound was performed and demonstrated an adequate fluid collection on the left hemithorax.     Thoracentesis Indication:pleural effusion    Doppler of the skin  demonstrated an area at this site without significant vasculature.  A thoracentesis at this site was subsequently performed.    Ultrasound revealed normal lung sliding after the procedure.  LEDA ESPINOZA MD        Labs:  BMP  Recent Labs   Lab 10/01/24  0427 09/30/24  0414 09/29/24  0458 09/28/24  0450   * 131* 130* 129*   POTASSIUM 4.1 4.3 4.3 4.8   CHLORIDE 98 98 99 98   PRANAV 7.8* 7.9* 7.9* 7.8*   CO2 25 24 24 25   BUN 15.2 17.1 15.9 17.7   CR 0.67 0.63* 0.64* 0.66*   GLC 84 85 88 93     CBC  Recent Labs   Lab 10/01/24  0427 09/30/24  0414 09/29/24  0458 09/28/24  0450   WBC 7.5 9.3 8.9 9.0   RBC 2.50* 2.63* 2.61* 2.63*   HGB 7.5* 7.9* 7.9* 7.9*   HCT 24.1* 25.4* 24.6* 25.0*   MCV 96 97 94 95   MCH 30.0 30.0 30.3 30.0   MCHC 31.1* 31.1* 32.1 31.6   RDW 16.8* 16.7* 16.6* 16.6*    259 260 250     INR  Recent Labs   Lab 10/01/24  0427 09/30/24  0414 09/29/24  0458 09/28/24  0450   INR 3.14* 3.08* 3.39* 3.00*      Hepatic Panel  Recent Labs   Lab 09/27/24 0441 09/26/24  0504 09/25/24  0520   AST 34 34 23   ALT 28 29 15   ALKPHOS 76 79 68   BILITOTAL 0.3 0.3 0.4   ALBUMIN 2.4* 2.4* 2.3*     GLUCOSE:   Recent Labs   Lab 10/01/24  0427 09/30/24  0414 09/29/24  0458 09/28/24  0450 09/27/24  0441 09/26/24  0504   GLC 84 85 88 93 91 83

## 2024-10-02 ENCOUNTER — APPOINTMENT (OUTPATIENT)
Dept: OCCUPATIONAL THERAPY | Facility: CLINIC | Age: 74
DRG: 001 | End: 2024-10-02
Attending: INTERNAL MEDICINE
Payer: MEDICARE

## 2024-10-02 ENCOUNTER — APPOINTMENT (OUTPATIENT)
Dept: GENERAL RADIOLOGY | Facility: CLINIC | Age: 74
DRG: 001 | End: 2024-10-02
Attending: INTERNAL MEDICINE
Payer: MEDICARE

## 2024-10-02 ENCOUNTER — APPOINTMENT (OUTPATIENT)
Dept: PHYSICAL THERAPY | Facility: CLINIC | Age: 74
DRG: 001 | End: 2024-10-02
Attending: INTERNAL MEDICINE
Payer: MEDICARE

## 2024-10-02 LAB
ANION GAP SERPL CALCULATED.3IONS-SCNC: 7 MMOL/L (ref 7–15)
BUN SERPL-MCNC: 14.8 MG/DL (ref 8–23)
CALCIUM SERPL-MCNC: 7.9 MG/DL (ref 8.8–10.4)
CHLORIDE SERPL-SCNC: 98 MMOL/L (ref 98–107)
CREAT SERPL-MCNC: 0.67 MG/DL (ref 0.67–1.17)
EGFRCR SERPLBLD CKD-EPI 2021: >90 ML/MIN/1.73M2
ERYTHROCYTE [DISTWIDTH] IN BLOOD BY AUTOMATED COUNT: 17.2 % (ref 10–15)
GLUCOSE SERPL-MCNC: 88 MG/DL (ref 70–99)
HCO3 SERPL-SCNC: 23 MMOL/L (ref 22–29)
HCT VFR BLD AUTO: 25.8 % (ref 40–53)
HGB BLD-MCNC: 8.2 G/DL (ref 13.3–17.7)
INR PPP: 3.34 (ref 0.85–1.15)
MAGNESIUM SERPL-MCNC: 1.8 MG/DL (ref 1.7–2.3)
MCH RBC QN AUTO: 30.6 PG (ref 26.5–33)
MCHC RBC AUTO-ENTMCNC: 31.8 G/DL (ref 31.5–36.5)
MCV RBC AUTO: 96 FL (ref 78–100)
PHOSPHATE SERPL-MCNC: 2.8 MG/DL (ref 2.5–4.5)
PLATELET # BLD AUTO: 270 10E3/UL (ref 150–450)
POTASSIUM SERPL-SCNC: 4.1 MMOL/L (ref 3.4–5.3)
RBC # BLD AUTO: 2.68 10E6/UL (ref 4.4–5.9)
SODIUM SERPL-SCNC: 128 MMOL/L (ref 135–145)
WBC # BLD AUTO: 8.5 10E3/UL (ref 4–11)

## 2024-10-02 PROCEDURE — 84100 ASSAY OF PHOSPHORUS: CPT

## 2024-10-02 PROCEDURE — 71045 X-RAY EXAM CHEST 1 VIEW: CPT | Mod: 26 | Performed by: RADIOLOGY

## 2024-10-02 PROCEDURE — 71045 X-RAY EXAM CHEST 1 VIEW: CPT

## 2024-10-02 PROCEDURE — 258N000003 HC RX IP 258 OP 636: Performed by: INTERNAL MEDICINE

## 2024-10-02 PROCEDURE — 85610 PROTHROMBIN TIME: CPT

## 2024-10-02 PROCEDURE — 250N000013 HC RX MED GY IP 250 OP 250 PS 637: Performed by: INTERNAL MEDICINE

## 2024-10-02 PROCEDURE — 97110 THERAPEUTIC EXERCISES: CPT | Mod: GP | Performed by: REHABILITATION PRACTITIONER

## 2024-10-02 PROCEDURE — 250N000013 HC RX MED GY IP 250 OP 250 PS 637: Performed by: PHYSICIAN ASSISTANT

## 2024-10-02 PROCEDURE — 250N000013 HC RX MED GY IP 250 OP 250 PS 637: Performed by: NURSE PRACTITIONER

## 2024-10-02 PROCEDURE — 97116 GAIT TRAINING THERAPY: CPT | Mod: GP | Performed by: REHABILITATION PRACTITIONER

## 2024-10-02 PROCEDURE — 83735 ASSAY OF MAGNESIUM: CPT

## 2024-10-02 PROCEDURE — 250N000013 HC RX MED GY IP 250 OP 250 PS 637

## 2024-10-02 PROCEDURE — 97530 THERAPEUTIC ACTIVITIES: CPT | Mod: GP | Performed by: REHABILITATION PRACTITIONER

## 2024-10-02 PROCEDURE — 250N000011 HC RX IP 250 OP 636: Performed by: STUDENT IN AN ORGANIZED HEALTH CARE EDUCATION/TRAINING PROGRAM

## 2024-10-02 PROCEDURE — 80048 BASIC METABOLIC PNL TOTAL CA: CPT | Performed by: NURSE PRACTITIONER

## 2024-10-02 PROCEDURE — 85027 COMPLETE CBC AUTOMATED: CPT

## 2024-10-02 PROCEDURE — 97140 MANUAL THERAPY 1/> REGIONS: CPT | Mod: GO | Performed by: OCCUPATIONAL THERAPIST

## 2024-10-02 PROCEDURE — 250N000011 HC RX IP 250 OP 636: Performed by: INTERNAL MEDICINE

## 2024-10-02 PROCEDURE — 250N000013 HC RX MED GY IP 250 OP 250 PS 637: Performed by: SURGERY

## 2024-10-02 PROCEDURE — 99231 SBSQ HOSP IP/OBS SF/LOW 25: CPT | Mod: 24 | Performed by: NURSE PRACTITIONER

## 2024-10-02 PROCEDURE — 120N000003 HC R&B IMCU UMMC

## 2024-10-02 RX ORDER — BUMETANIDE 1 MG/1
1 TABLET ORAL DAILY
Status: DISCONTINUED | OUTPATIENT
Start: 2024-10-02 | End: 2024-10-04

## 2024-10-02 RX ORDER — HYDRALAZINE HYDROCHLORIDE 50 MG/1
50 TABLET, FILM COATED ORAL ONCE
Status: COMPLETED | OUTPATIENT
Start: 2024-10-02 | End: 2024-10-02

## 2024-10-02 RX ORDER — LOSARTAN POTASSIUM 50 MG/1
50 TABLET ORAL 2 TIMES DAILY
Status: DISCONTINUED | OUTPATIENT
Start: 2024-10-02 | End: 2024-10-05 | Stop reason: HOSPADM

## 2024-10-02 RX ORDER — MAGNESIUM OXIDE 400 MG/1
400 TABLET ORAL EVERY 4 HOURS
Status: COMPLETED | OUTPATIENT
Start: 2024-10-02 | End: 2024-10-02

## 2024-10-02 RX ORDER — WARFARIN SODIUM 1 MG/1
1 TABLET ORAL
Status: COMPLETED | OUTPATIENT
Start: 2024-10-02 | End: 2024-10-02

## 2024-10-02 RX ADMIN — AMIODARONE HYDROCHLORIDE 200 MG: 200 TABLET ORAL at 08:16

## 2024-10-02 RX ADMIN — ACETAMINOPHEN 975 MG: 325 TABLET ORAL at 08:15

## 2024-10-02 RX ADMIN — ACETAMINOPHEN 975 MG: 325 TABLET ORAL at 22:51

## 2024-10-02 RX ADMIN — GABAPENTIN 100 MG: 100 CAPSULE ORAL at 22:52

## 2024-10-02 RX ADMIN — HYDRALAZINE HYDROCHLORIDE 50 MG: 50 TABLET ORAL at 01:29

## 2024-10-02 RX ADMIN — Medication 10 MG: at 22:52

## 2024-10-02 RX ADMIN — OXYCODONE HYDROCHLORIDE 5 MG: 5 TABLET ORAL at 10:11

## 2024-10-02 RX ADMIN — METHOCARBAMOL TABLETS 750 MG: 750 TABLET, COATED ORAL at 14:54

## 2024-10-02 RX ADMIN — HEPARIN, PORCINE (PF) 10 UNIT/ML INTRAVENOUS SYRINGE 5 ML: at 06:50

## 2024-10-02 RX ADMIN — IRON SUCROSE 200 MG: 20 INJECTION, SOLUTION INTRAVENOUS at 17:57

## 2024-10-02 RX ADMIN — METHOCARBAMOL TABLETS 750 MG: 750 TABLET, COATED ORAL at 19:53

## 2024-10-02 RX ADMIN — LOSARTAN POTASSIUM 50 MG: 50 TABLET, FILM COATED ORAL at 08:16

## 2024-10-02 RX ADMIN — WARFARIN SODIUM 1 MG: 1 TABLET ORAL at 17:57

## 2024-10-02 RX ADMIN — LOSARTAN POTASSIUM 50 MG: 50 TABLET, FILM COATED ORAL at 19:53

## 2024-10-02 RX ADMIN — MAGNESIUM OXIDE TAB 400 MG (241.3 MG ELEMENTAL MG) 400 MG: 400 (241.3 MG) TAB at 10:11

## 2024-10-02 RX ADMIN — PANTOPRAZOLE SODIUM 40 MG: 40 TABLET, DELAYED RELEASE ORAL at 08:16

## 2024-10-02 RX ADMIN — THERA TABS 1 TABLET: TAB at 08:16

## 2024-10-02 RX ADMIN — ESCITALOPRAM OXALATE 10 MG: 10 TABLET ORAL at 08:16

## 2024-10-02 RX ADMIN — ACETAMINOPHEN 975 MG: 325 TABLET ORAL at 15:39

## 2024-10-02 RX ADMIN — MAGNESIUM OXIDE TAB 400 MG (241.3 MG ELEMENTAL MG) 400 MG: 400 (241.3 MG) TAB at 06:50

## 2024-10-02 RX ADMIN — ATORVASTATIN CALCIUM 80 MG: 80 TABLET, FILM COATED ORAL at 19:53

## 2024-10-02 RX ADMIN — METHOCARBAMOL TABLETS 750 MG: 750 TABLET, COATED ORAL at 08:16

## 2024-10-02 RX ADMIN — BUMETANIDE 1 MG: 1 TABLET ORAL at 10:19

## 2024-10-02 ASSESSMENT — ACTIVITIES OF DAILY LIVING (ADL)
ADLS_ACUITY_SCORE: 32
ADLS_ACUITY_SCORE: 32
ADLS_ACUITY_SCORE: 31
ADLS_ACUITY_SCORE: 32
ADLS_ACUITY_SCORE: 32
ADLS_ACUITY_SCORE: 31
ADLS_ACUITY_SCORE: 32
ADLS_ACUITY_SCORE: 31
ADLS_ACUITY_SCORE: 31
ADLS_ACUITY_SCORE: 32
ADLS_ACUITY_SCORE: 31

## 2024-10-02 NOTE — PROGRESS NOTES
The patient's HeartMate LVAD was interrogated 10/2/2024  * Speed 5200 rpm   * Pulsatility index 5.3  * Power 3.5 Dahl   * Flow 3.6 L/minute   Fluid status: near euvolemic to mild hypervolemia   Alarms were reviewed, and notable for rare pi events, no alarms.   The driveline exit site was inspected, c/d/i.   All external components were inspected and showed no evidence of damage or malfunction, none replaced.   Speed decreased to 5100 rpm

## 2024-10-02 NOTE — PROGRESS NOTES
Cardiovascular Surgery Progress Note  10/02/2024         Assessment and Plan:     Duane C Johnson is a 74 year old male with a PMH of  HFrEF 2/2 ICM with LVEF 20-30%, atrial fibrillation, 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 Due to worsening functional limitations due to heart failure, patient was worked up for VAD while inpatient. Patient is now s/p implantation of HeartMate 3 LVAD on 9/18/2024 with Dr. Mulvihill. Returned to OR on POD 0 for mediastinal re-exploration, mediastinal clot evacuation, and chest washout.     Cardiovascular:   HFrEF 2/2 ICM - s/p HM3 LVAD implant 9/18/24  Cardiogenic shock, improved  Hx afib s/p DCCV 9/5/24 and 9/30/24  Hx VT/VF arrest with ICD in place (5/8/24)  Hx STEMI, CAD s/p PCI to LAD (2023)  HTN  HLD  High MAPs overnight with no flow alarms, in NSR  Preop echo showed LV EF 10-15%  Postop echo 9/27 showed LVAD cannula seen in the expected anatomic position in the LV apex, normal flow velocities, moderately reduced RV function, normal IVC   - ASA not indicated  - Atorvastatin 80 mg daily  - PTA amiodarone 200 mg daily  - Cards 2 following for heart failure optimization, appreciate assistance  - S/p MELBA/DCCV 9/30 to NSR   -10/2 Cards 2 increased Losartan to 50 mg daily, with plans to start entresto when ACEi washes out    Chest tubes: all removed    Pulmonary:  - Extubated POD 2; now saturating well on RA   - Pulm toilet, IS, OOB/activity and deep breathing  -Left pleural effusion resolved s/p left thora on 10/1 for 1 L removed  -Right pleural effusion  - Plan for right thora today 10/2    Neurology / MSK:  Acute post-operative pain  - Multimodal analgesia with acetaminophen, oxycodone PRN, Robaxin PRN, HS gabapentin  Anxiety  - PTA Lexapro resumed  S/p sternotomy   - PT/OT/CR consults   - Sternal precautions  Insomnia  - melatonin increased from 5 mg to 10 mg HS  9/28  Transient right eye visual deficit   - 9/29 patient reported transient discoloration in the top half of the visual field in his right eye, no additional neurologic deficits noted  - neurology consulted - recommend CT head and CTA head/neck to rule out stroke (patient can't get MRI due to LVAD); not a TPA candidate   - CT head and CTA head/neck without acute pathology/significant stenosis  - ophthalmology consulted - no evidence of CRAO. Most likely diagnosis is atypical amaurosis vs possible impending CRVO, agree with CT head/CTA head neck  -  Patient will need non-urgent follow up in ophthalmology clinic for repeat dilated exam, OCT macula each eye, and IVFA with transit right eye      / Renal / FE:  Hyponatremia  Hx prostate cancer s/p Leuprolide/radiation therapy   - Baseline creatinine 0.9-1  - Diuresis: Per cards, Bumex 1 mg ordered today   - lymphedema wraps ordered 9/28  - Replace electrolytes prn per protocol  - Strict I/O, daily standing weights per protocol  - Completed Leuprolide on 6/11/24 and radiation therapy on 6/27/24. Last PSA was <0.01 on 7/29/24   - Daily BMP    GI / Nutrition:   - Mechanical/dental soft diet, continue bowel regimen    Endocrine:  Stress-induced hyperglycemia, resolved  Hgb A1c 5.4%    - Initially managed on insulin drip postop, transitioned to sliding scale; discontinued POCT and sliding scale given maintenance of euglycemia without need for supplemental insulin    Infectious Disease:  Stress-induced leukocytosis, resolved  - WBC WNL, afebrile, no signs or symptoms of infection  - Completed perioperative antibiotics    Hematology:   Acute blood loss anemia  Thrombocytopenia, resolved  Warfarin AC for LVAD  Hgb stable; Plt WNL, no signs or symptoms of active bleeding    Anticoagulation:   - Coumadin for LVAD, INR goal 2-3. Most recent INR mildly supra therapeutic at 3.34  - LIH discontinued    Prophylaxis:   - Stress ulcer prophylaxis: Pantoprazole 40 mg daily for 30 days  "post-operatively  - DVT prophylaxis: warfarin AC, SCD, OOB/activity    Disposition:   - Transferred to  on   - Therapies recommending discharge to ARU   - Medically Ready for Discharge: Anticipated today 10/2 pending right thoracentesis, speed optimization     Discussed with Dr. Mulvihill.     NADEGE Tyler, [facility]-, CCRN  Nurse Practitioner  Cardiothoracic Surgery  Pager: [phone]            Interval History:     High MAPS overnight (). States pain is well managed on current regimen, slept well. No SOB, breathing well on room air. Tolerating diet, is passing flatus and having BM, no n/v. Reports mild headaches this morning. Ambulating in the halls with assistance. No complaints of eye discomfort today. Denies chest pain, palpitations, dizziness, syncopal symptoms, chills, myalgias, sternal popping/clicking.           Physical Exam:   Blood pressure 99/72, pulse 78, temperature 98  F (36.7  C), temperature source Oral, resp. rate 18, height 1.676 m (5' 5.98\"), weight 70.9 kg (156 lb 3.2 oz), SpO2 99%.  Vitals:    24 0400 10/01/24 0600 10/02/24 0629   Weight: 72.1 kg (159 lb) 70.6 kg (155 lb 9.6 oz) 70.9 kg (156 lb 3.2 oz)        Gen: up in chair, NAD  Neuro: A&Ox4, intact, no focal deficits  CV: RRR, NSR on monitor, LVAD hum  Pulm: CTA room air  Abd: nondistended, non-tender  Ext: 2+ peripheral edema bilaterally, wraps in place  Incision: clean, dry, intact, no erythema or induration, sternum stable  Tubes/drain sites: dressing clean and dry  Lines: driveline dressing clean and dry     Heartmate 3 LEFT VS  Flow (Lpm): 3 Lpm  Pulse Index (PI): 6.8 PI  Speed (rpm): 5200 rpm  Power (jackson): 3.4 jackson  Current Hct settin     Clinically Significant Risk Factors         # Hyponatremia: Lowest Na = 128 mmol/L in last 2 days, will monitor as appropriate      # Hypoalbuminemia: Lowest albumin = 2.2 g/dL at 2024  3:18 AM, will monitor as appropriate        # End stage heart failure: " "Ventricular assist device (VAD) present          # Overweight: Estimated body mass index is 25.22 kg/m  as calculated from the following:    Height as of this encounter: 1.676 m (5' 5.98\").    Weight as of this encounter: 70.9 kg (156 lb 3.2 oz).   # Moderate Malnutrition: based on nutrition assessment      # Financial/Environmental Concerns: none   # ICD device present            Data:    Imaging:  reviewed recent imaging    Recent Results (from the past 24 hour(s))   XR Chest Port 1 View    Narrative    Exam: XR CHEST PORT 1 VIEW, 10/1/2024 11:19 AM    Indication: Post left thora    Comparison: Same day at 0621, 9/30/2024    Findings:   Portable semiupright AP view of the chest. Intact median sternotomy  wires. Surgical clips project over the mediastinum. Stable position of  the LVAD apparatus at the cardiac apex. Left upper chest implantable  cardiac defibrillator leads are intact and project over the right  atrium and right ventricle. Right upper extremity PICC line tip  terminates at the superior cavoatrial junction.    Trachea is midline. Unchanged enlarged cardiac silhouette. Aortic arch  calcifications. No appreciable pneumothorax. Bibasilar hazy opacities,  decreased on the left. Silhouetting of the hemidiaphragms, decreased  on the left. Underlying retrocardiac opacity.      Impression    Impression:   1.  No appreciable pneumothorax.  2.  Stable cardiomegaly with bilateral layering pleural effusions,  decreased on the left.  3.  Stable positioning of support devices.  4.  Continued bibasilar atelectasis, decreased on the left.    I have personally reviewed the examination and initial interpretation  and I agree with the findings.    PIERCE FERNANDEZ,          SYSTEM ID:  [id number]   Echo Complete   Result Value    LVEF  <30% (severely reduced)    Narrative    [id number]  [id number]  [id number]  [id number]^BIJAL^DARCI^T     [facility],[place]  Echocardiography Laboratory  [address] [place] [address] " [place] [address]     Name: JOHNSON, DUANE C  MRN: [id number]  : 1950  Study Date: 10/01/2024 01:24 PM  Age: 74 yrs  Gender: Male  Patient Location: [facility]  Reason For Study: Cardiac Device, Unspecified  Ordering Physician: DARCI APPIAH  Performed By: Ag Larios     BSA: 1.8 m2  Height: 65 in  Weight: 155 lb  ______________________________________________________________________________  Procedure  LVAD Echocardiogram Turndown.  ______________________________________________________________________________  Interpretation Summary  HM3 at 5200RPM. Speed optimization study. Please refer to EPCI for details.     LVIDd 40mm.  Septum normal.  Aortic valve opens mildly every other beat. No AI.  Normal inflow velocities.  Global right ventricular function is moderately reduced.  Moderate right ventricular dilation is present.  The inferior vena cava cannot be assessed.     This study was compared with the study from 2024 .No significant changes  noted.  ______________________________________________________________________________  Left Ventricle  Left ventricular size is normal. Relative wall thickness is increased  consistent with concentric remodeling. Left ventricular function is decreased.  The ejection fraction is <30% (severely reduced). Diastolic function not  assessed due to presence of LVAD. Severe diffuse hypokinesis is present.     Right Ventricle  Moderate right ventricular dilation is present. Global right ventricular  function is moderately reduced.     Atria  The left atrium appears normal. Moderate right atrial enlargement is present.     Mitral Valve  The mitral valve is normal.     Aortic Valve  No aortic regurgitation is present. Aortic valve opens mildly every other  beat.     Tricuspid Valve  The tricuspid valve is normal. Trace tricuspid insufficiency is present. The  right ventricular systolic pressure is approximated at 19.8 mmHg plus the  right atrial pressure.      Pulmonic Valve  The pulmonic valve is normal.     Vessels  The inferior vena cava cannot be assessed.     Pericardium  No pericardial effusion is present.     Compared to Previous Study  This study was compared with the study from 2024 . No significant changes  noted.  ______________________________________________________________________________  MMode/2D Measurements & Calculations  IVSd: 1.1 cm  LVIDd: 4.0 cm  LVIDs: 3.4 cm  LVPWd: 1.3 cm  FS: 14.9 %  LV mass(C)d: 163.6 grams  LV mass(C)dI: 92.2 grams/m2  RWT: 0.65     Doppler Measurements & Calculations  TR max francisco j: 222.3 cm/sec  TR max P.8 mmHg     ______________________________________________________________________________  Report approved by: Jeanine MEJIA 10/01/2024 02:52 PM         XR Chest Port 1 View    Narrative    Exam: XR CHEST PORT 1 VIEW, 10/2/2024 6:27 AM    Comparison: Radiographs 10/1/2024    History: chest tubes s/p LVAD insertion    Findings:  Portable AP view of the chest. Stable support devices including right  upper extremity PICC line, left chest wall ICD, and LVAD. Postsurgical  changes of the chest with intact median sternotomy wires. Stable  enlarged cardiomediastinal silhouette. Aortic arch calcifications.  Decreased hazy bibasilar opacities. Persistent small right pleural  effusion. Left costophrenic angle is obscured by the LVAD. No  significant left pleural effusion identified.      Impression    Impression:   1. Decreased hazy bibasilar opacities.  2. Persistent small right pleural effusion. No significant left  pleural effusion visualized.  3. Stable support devices.    I have personally reviewed the examination and initial interpretation  and I agree with the findings.    YESENIA HINTON MD         SYSTEM ID:  [id number]       Labs:  [facility]  Recent Labs   Lab 10/02/24  0500 10/01/24  0427 24  0414 24  0458   * 129* 131* 130*   POTASSIUM 4.1 4.1 4.3 4.3   CHLORIDE 98 98 98 99   PRANAV 7.9* 7.8* 7.9* 7.9*    CO2 23 25 24 24   BUN 14.8 15.2 17.1 15.9   CR 0.67 0.67 0.63* 0.64*   GLC 88 84 85 88     CBC  Recent Labs   Lab 10/02/24  0500 10/01/24  0427 09/30/24  0414 09/29/24  0458   WBC 8.5 7.5 9.3 8.9   RBC 2.68* 2.50* 2.63* 2.61*   HGB 8.2* 7.5* 7.9* 7.9*   HCT 25.8* 24.1* 25.4* 24.6*   MCV 96 96 97 94   MCH 30.6 30.0 30.0 30.3   MCHC 31.8 31.1* 31.1* 32.1   RDW 17.2* 16.8* 16.7* 16.6*    255 259 260     INR  Recent Labs   Lab 10/02/24  0500 10/01/24  0427 09/30/24  0414 09/29/24  0458   INR 3.34* 3.14* 3.08* 3.39*      Hepatic Panel  Recent Labs   Lab 09/27/24  0441 09/26/24  0504   AST 34 34   ALT 28 29   ALKPHOS 76 79   BILITOTAL 0.3 0.3   ALBUMIN 2.4* 2.4*     GLUCOSE:   Recent Labs   Lab 10/02/24  0500 10/01/24  0427 09/30/24  0414 09/29/24  0458 09/28/24  0450 09/27/24  0441   GLC 88 84 85 88 93 91

## 2024-10-02 NOTE — PLAN OF CARE
Goal Outcome Evaluation:      Plan of Care Reviewed With: patient    1500-1930:  Neuro: Patient is A&OX4, up with A1X1.  Cardiac: SR map 84-doppler and denies pain.  Respiratory: Diminished LS on room air.  GI/: Mechanical soft diet from missing teeth.  LDA: Double PICC SL with blood return noted R-AC.  Plan: Possible transition to TCU soon.

## 2024-10-02 NOTE — PROGRESS NOTES
CLINICAL NUTRITION SERVICES - REASSESSMENT NOTE     Nutrition Prescription    RECOMMENDATIONS FOR MDs/PROVIDERS TO ORDER:  ***    Malnutrition Status:    { Malnutrition Diagnosis:399404}    Recommendations already ordered by Registered Dietitian (RD):  ***    Future/Additional Recommendations:  ***    RECOMMENDATIONS FOR MDs/PROVIDERS TO ORDER:  None at this time.     Malnutrition Status:    Moderate malnutrition in the context of acute illness or injury.     Recommendations already ordered by Registered Dietitian (RD):  Ensure Enlive ordered TID, chocolate or strawberry.     Future/Additional Recommendations:  Monitor wt, energy intake, and supplement intake/preferences.       Recommendations / Nutrition Prescription  Encourage small, frequent PO attempts with high protein sources  Ensure, magic cup or Core Power drink 42g Pro daily.      Nutrition Education  1.  Provided instruction on the need to eat small, frequent meals. Importance of adequate oral intake post-op, especially for 6-8 weeks, was discussed.  2.  Provided handout regarding the amounts of protein in various food items. Discussed protein goals with pt.   Implementation  Nutrition education for recommended modifications     EVALUATION OF THE PROGRESS TOWARD GOALS   Diet: { Diet:913880}    Intake/Tolerance: { Intake:578178}     NEW FINDINGS   ***    GI:  Last BM: 10/01/24  { GI:926894}    Weight:  Most Recent Weight: 70.9 kg (156 lb 3.2 oz)  on ***/***/*** via Standing scale  Body mass index is 25.22 kg/m .  { Weight Hx:159939}    Meds:  ***    Labs:  ***    { RRS:893376}  ***    MALNUTRITION  % Intake: { Malnutrition Percent Intake:962678}  % Weight Loss: { Malnutrition Weight:620091}  Subcutaneous Fat Loss: { Malnutrition Fat Loss:068861}   Muscle Loss: { Malnutrition Muscle Loss:936475}  Fluid Accumulation/Edema: { Malnutrition Edema:730514}  Malnutrition Diagnosis: { Malnutrition Diagnosis:718537}  % Intake: < 75% for > 7  days (moderate)  % Weight Loss: Unable to assess w/fluid buildup.  Subcutaneous Fat Loss: Facial region:  severe, Upper arm:  severe, and Thoracic/intercostal:  severe  Muscle Loss: Temporal:  severe, Facial & jaw region:  severe, Scapular bone:  severe, Thoracic region (clavicle, acromium bone, deltoid, trapezius, pectoral):  severe, Upper arm (bicep, tricep):  severe, and Dorsal hand:  mild  Fluid Accumulation/Edema: Mild peripheral edema per APRN CNP note (24)  Malnutrition Diagnosis: Moderate malnutrition in the context of acute illness or injury.  Previous Goals   Patient to consume % of nutritionally adequate meal trays TID, or the equivalent with supplements/snacks.   Evaluation: { Nut Goal Eval:385398}    Previous Nutrition Diagnosis  Moderate malnutrition in the context of acute illness or injury related to inadequate oral intake as evidenced by intake <75% EER for >7 days, severe subcutaneous fat loss (facial, temporal region, upper arm, and thoracic/intercostal), severe muscle loss (Facial, temporal, jaw region, scapular bone, upper arm and thoracic region, and mild peripheral edema.   Evaluation: { Nut Monitorin}    CURRENT NUTRITION DIAGNOSIS  { Nutrition Diagnosis:633723} related to *** as evidenced by ***      INTERVENTIONS  Implementation  {  Implement:502839}     Goals  { Nutrition Goals:436030}    Monitoring/Evaluation  Progress toward goals will be monitored and evaluated per protocol.    Malina Suresh MS, RD, LD, [facility]    6C (beds 7210-5570) + 7C (beds 9676-2518) + ED   Available in [facility] by name or unit dietitian

## 2024-10-02 NOTE — CONSULTS
[facility]  CAPS NOTE  Date of Admission:  8/30/2024  Consult Requested by: Cardiothoracic Surgery  Reason for Consult:  Therapeutic thoracentesis    No results found for this or any previous visit from the past 1 day.        Assessment and Plan:  Requested procedure was not performed.  Bleeding risk outweighs benefits of performing the procedure at this time.  Increasing INR to 3.34, per guidelines INR for thoracentesis needs to be under 3 to decrease bleeding risk. Question if procedure currently indicated given imaging with small R pleural effusion and patient on room air for multiple days. If INR drops below 3 and therapeutic indication for thoracentesis please consult CAPS team. Discussed with primary team.    Ryley Franco MD  [facility]  Securely message with [employer] (more info)  Text page via [facility] Paging/Directory   See signed in provider for up to date coverage information

## 2024-10-02 NOTE — PROGRESS NOTES
Post-VAD Social Work Services Progress Note    Date of Initial Social Work Evaluation: 09/05/2024   Collaborated with: PRO, Parudi [facility] URIAH, [facility] ARU Liaison, Patient    Data: Duane was evaluated for LVAD and is remaining inpatient until he receives his VAD implant. He continues to work with therapies and working towards increasing his nutrition prior to LVAD implant. Duane received his LVAD on 9/18/24. Patient was extubated on 9/20 and transferred to  on 9/24. Patient had chest tube removed on 9/24 and continues to work with therapies. Patient had chest tube removed on 9/30. Patient had left thora 10/1 and was scheduled to have right thora this AM, however, thora was cancelled d/t team not feeling it was needed. Pt medically ready today and is anticipating transition to ARU due to PT recs. [facility] AR[facility] following. Patient is on their waitlist. No beds available today.    Intervention: SW collaborated with MERRYTS, [employer] URIAH and [facility] Liaison to discuss medical readiness. Patient medically ready to discharge today, though there are no ARU beds today. [facility] Liaison contact CV[facility]. SW met with patient to provide update. Assessed coping and inquired into questions or concerns.    Assessment: Patient aware of plan of care. Pt is agreeable to plan of care and discussed readiness to transition to ARU. He indicated he would be able to provide update to his wife of potential to transition to ARU tomorrow. He indicated he has been sleeping better the past day or so, as well as therapies have been challenging. Denied additional questions or concerns at this time.  Education provided by SW: ongoing SW availability, discharge planning  Plan:    Discharge Plans in Progress:  ARU    Barriers to d/c plan: bed availability    Follow up Plan: SW to continue to follow for any potential psychosocial concerns pre and post VAD implant. SW to connect with  Rehab liaison tomorrow to determine bed availability. Will set up transport when bed is  available.      Jessi Almendarez, MSW, LGSW, APSW  Heart Transplant/[facility]   Teams/[place]  Ph. [phone]

## 2024-10-02 NOTE — PLAN OF CARE
[id number]:    Neuro: A/Ox4. Forgetful at times, Anxious about being able to care for self after discharge.   Cardiac: SR 1st degree AVB . MAPS continue to run high. . Oral hydralazine given x 1 without much change. MD notified- will clarify further BP med changes with primary team in am. No LVAD alarms.   Respiratory: O2 sats stable on RA  GI/: Voiding dark urine, No BM overnight  Diet/appetite:  Mechanical soft diet  Activity:  Up with assist of one and walker  Pain: Tylenol, Robaxin, Gabapentin for back pain with relief  Skin: Sternal inc CDI, OLD CT sites covered with old drainage, LVAD dressing CDI, LE wraps on.   LDA's: PIV x 2, PICC    Plan: Continue with POC. Notify primary team with changes.

## 2024-10-02 NOTE — PROGRESS NOTES
[facility]   Cardiology II Service / Advanced Heart Failure  Daily Consult Note      Patient: Duane C Johnson  MRN: [id number]  Admission Date: 8/30/2024  Hospital Day # 33    Assessment and Plan: Duane C Johnson is a 74 year old male pmhx of anterior STEMI s/p PCI to the pLAD on 10/26/23 with a VT/VF arrest the next day (10/27) with patent stents and unchanged anatomy on repeat angiogram. Placed on amiodarone and discharged 11/03/23, ICD was not indicated as this was within 48h of his MI. His EF prior to discharge was 20-30% with a large area of akinesis in the LAD, placed on apixaban due to this (Apixaban dcd on 7/5/24). Has had multiple admissions for HF exacerbation last year.  Admitted on 8/30/24 for CHF exacerbation with BNP 16k. CPX and RHC showed significant functional limitations due to heart failue. Underwent HM3 LVAD on 9/18/24.     Recommendations:  - Increased losartan from 25 mg BID to 50 mg BID for persistently elevated MAPs, entresto ~$200/month  - Aldosterone antagonist deferred while other medical therapy is prioritized  - Ongoing vad teaching, session today at 1000 with wife.  - Patient is medically ready for discharge from Cards 2 standpoint.   - Change LVAD speed to 5100 based on results of Opti Flow testing 10/1.    # Acute on chronic systolic heart failure secondary to ICM   # s/p HM3 LVAD as DT on 9/18/2024  # CAD s/p PCI  # History of STEMI  # s/p ICD 5/2024  Stage D. NYHA Class III.    Fluid status: Mild hypervolemia- evident on physical exxam, will start back on 1 mg bumex daily.   ACEi/ARB/ARNi: 25 mg losartan BID increased to 50 mg BID due to elevated MAPs.   BB: Recent LVAD, will defer to outpatient  Aldosterone antagonist deferred while other medical therapy is prioritized  SGLT2i: Deferred while other medical therapy is prioritized  SCD prophylaxis: ICD  MAP: goal 65-85, current MAPs   LDH trends: 271, stable  Anticoagulation: warfarin dosing per pharmacy, INR  goal 2-3, today 3.34.   Antiplatelet: ASA not indicated in LVAD population, > 6 months s/p STEMI  Limited TTE 9/27 with LVIDd 4.0 cm, Closed AoV, no AI, moderately reduced RV, normal IVC. Multiple low flow alarms 10/1 received 50 mg hydralazine PO and 500 ml LR.     # Low flow alarms- Resolved  # HTN  Multiple low flow alarms 10/1, none reported 10/2.   - Speed optimization echo performed yesterday 10/1  - Losartan increased from 25 mg BID to 50 mg BID    # History of VT/VF arrest 2023  # AFib s/p DCCV 9/2024 and again 9/30/2024  - Successfully cardioverted in early September for AF. Since then EKG suggested AF on 9/21. Tele is only available from as early as 9/22. The patient was started on hep gtt 9/21, but unclear if was in AF before this. While the patient was on hep gtt until INR was therapeutic, it is not possible to assess rhythm prior to 9/21. Systemic AC was off on 9/18-9/20. S/p MELBA and DCCV on 9/30 with conversion to sinus rhythm   - continue amiodarone 200 mg daily   - Continue AC as above    # Visual changes. Stoke code call 9/29 for visual changes - right eye with decreased upper half of vision and bluish haze. Resolved after 30 minutes. Seen by opthalmology and neuro. Negative head CT and CTA head and neck. He has had 3 episodes which last about 5 minutes before resolving completely.   - Ophthalmology and Neuro, signed off  - Already on A/C  - Continue to monitor for changes    # Hyponatremia hypovolemia   Na 129, ranging 128-133  - encourage PO intake  - daily BMP  - Resume bumex 1 mg  daily for fluid overload.     # Anemia  - Hgb 7.5 10/1.No overt bleeds, slow downtrend since surgery. LDH downtrending.   - Iron studies suggestive of RADHA. Started on IV venofer x5 days    # Bilateral pleural effusions  - Chest tube removed 9/30.   - Left thoracentesis done 10/1, 1 L removed, R thoracentesis today per CV surgery.         Time/Communication  I spent 45 minutes reviewing results, care team notes, meeting  "directly with the patient, coordinating care, and completing documentation on the day of service.     -Nellie Purvis, EDSON Student    [facility]  Advanced Heart Failure    Jody Moreira DNP, NP-C  Nurse Practitioner - Advanced Heart Failure/Cardiology II Service  Rajivpanfilo preferred or Pager [phone]    ================================================================    Subjective/24-Hr Events:   No acute events overnight. No lightheadedness or dizziness. No pre-syncope or syncope. Denies any SOB this morning, complains of a mild headache this morning.     ROS:  4 point ROS including respiratory, CV, GI and  (other than that noted in the HPI) is negative.     Medications: Reviewed in EPIC.     Physical Exam:   BP 99/72   Pulse 78   Temp 98  F (36.7  C) (Oral)   Resp 18   Ht 1.676 m (5' 5.98\")   Wt 70.9 kg (156 lb 3.2 oz)   SpO2 99%   BMI 25.22 kg/m      GENERAL: Appears comfortable, in no distress, just had thoracentesis done.   HEENT: Eye symmetrical, no discharge or icterus bilaterally.  NECK: Supple,   CV: + LVAD hum  RESPIRATORY: Respirations regular, even, slightly labored. Lungs CTA throughout.    GI: Soft and non distended with normoactive bowel sounds present in all quadrants. No tenderness, rebound, guarding.   EXTREMITIES: Bilateral moderate pedal edema. All extremities are warm and well perfused.  NEUROLOGIC: Alert and interacting appropriately. No focal deficits.   SKIN: No jaundice. No rashes or lesions. Sternum c/d/i    Labs:  CMP  Recent Labs   Lab 10/02/24  0500 10/01/24  0427 09/30/24  0414 09/29/24  0458 09/28/24  0450 09/27/24  0441 09/26/24  0504   * 129* 131* 130*   < > 128* 129*   POTASSIUM 4.1 4.1 4.3 4.3   < > 4.3 4.4   CHLORIDE 98 98 98 99   < > 97* 98   CO2 23 25 24 24   < > 25 24   ANIONGAP 7 6* 9 7   < > 6* 7   GLC 88 84 85 88   < > 91 83   BUN 14.8 15.2 17.1 15.9   < > 20.1 26.6*   CR 0.67 0.67 0.63* 0.64*   < > 0.64* 0.63*   GFRESTIMATED >90 >90 >90 >90   < > >90 " >90   PRANAV 7.9* 7.8* 7.9* 7.9*   < > 7.8* 7.6*   MAG 1.8 1.8 1.8 1.8   < > 1.9 2.0   PHOS 2.8 3.0 3.0 3.0   < > 3.0 2.9   PROTTOTAL  --   --   --   --   --  5.3* 5.3*   ALBUMIN  --   --   --   --   --  2.4* 2.4*   BILITOTAL  --   --   --   --   --  0.3 0.3   ALKPHOS  --   --   --   --   --  76 79   AST  --   --   --   --   --  34 34   ALT  --   --   --   --   --  28 29    < > = values in this interval not displayed.       CBC  Recent Labs   Lab 10/02/24  0500 10/01/24  0427 09/30/24  0414 09/29/24  0458   WBC 8.5 7.5 9.3 8.9   RBC 2.68* 2.50* 2.63* 2.61*   HGB 8.2* 7.5* 7.9* 7.9*   HCT 25.8* 24.1* 25.4* 24.6*   MCV 96 96 97 94   MCH 30.6 30.0 30.0 30.3   MCHC 31.8 31.1* 31.1* 32.1   RDW 17.2* 16.8* 16.7* 16.6*    255 259 260       INR  Recent Labs   Lab 10/02/24  0500 10/01/24  0427 09/30/24  0414 09/29/24  0458   INR 3.34* 3.14* 3.08* 3.39*

## 2024-10-02 NOTE — PROVIDER NOTIFICATION
D: Pt's MAPS continue to run high, no prn meds ordered. No LVAD alarms.   I: MD notified.  A: Hydralazine 50 mg po ordered.  P: Check MAP 2 hrs after med given per MD. Will continue to monitor pt.

## 2024-10-02 NOTE — DISCHARGE SUMMARY
[facility] [place]   Cardiothoracic Surgery Hospital Discharge Summary     Duane C Johnson MRN# [id number]   Age: 74 year old YOB: 1950     Admitting Physician:  Michael Mulvihill, MD  Discharge Physician:  NADEGE Robledo CNP  Primary Care Physician:         Jose Coles     DATE OF ADMISSION: 8/30/2024      DATE OF DISCHARGE: October 5, 2024     Admit Wt: 145 lbs  Discharge Wt: 145 lbs          Primary Diagnoses:   HFrEF 2/2 ICM - s/p HM3 LVAD implant 9/18/24   Hx afib s/p DCCV 9/5/24 and 9/30/24   Anticoagulation: lifelong warfarin therapy for LVAD, goal INR 2-3          Secondary Diagnoses:   Acute postoperative pain, improving  Stress induced hyperglycemia, resolved  Stress induced leukocytosis, resolved  Acute blood loss anemia, stable  Acute blood loss thrombocytopenia, resolved  Hx VT/VF arrest with ICD in place (5/8/24)  Hx STEMI, CAD s/p PCI to LAD (2023)  HTN  HLD  Bilateral pleural effusions s/p bilateral thoracentesis  Hyponatremia, mild, chronic   Hx prostate cancer s/p Leuprolide/radiation therapy   Anxiety   Acute post-operative pain      PROCEDURES PERFORMED:   Date: 9/18/24.  Surgeon: Dr. Stanislaw Díaz  Implantation of HeartMate 3 left ventricular assist device     PROCEDURES PERFORMED:   Date: 9/18/24.  Surgeon: Dr. Stanislaw Díaz  1. Mediastinal re-exploration for post-operative hemorrhage (CPT 08370)  2. Transesophageal echocardiography  3. Temporary sternal closure    CULTURE RESULTS:    none    CONSULTS:    PT/OT  Intensivist  Heart Failure Cardiology    BRIEF HISTORY OF ILLNESS:  Duane C Johnson is a 74 year old male with a PMH of HFrEF 2/2 ICM with LVEF 20-30%, atrial fibrillation, 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 Due to worsening functional limitations due to heart failure, patient was worked up for VAD while  inpatient. Patient is now s/p implantation of HeartMate 3 LVAD on 2024 with Dr. Mulvihill. Returned to OR on POD 0 for mediastinal re-exploration, mediastinal clot evacuation, and chest washout.     HOSPITAL COURSE: Duane C Johnson is a 74 year old male who on 2024 underwent the above-named procedures and tolerated the operation well.     Postoperatively was admitted to the CVICU.  Patient was extubated on POD #2.  Blood pressure and cardiac index were managed with vasopressors and inotropic agents which were continuously weaned until no longer needed.  Patient was subsequently  transferred to the surgical telemetry floor.    While on the surgical unit, the patient continued to progress well. Chest tubes and temporary pacemaker wires were removed when deemed appropriate. Developed bilateral pleural effusions for which bilateral thoracentesis was performed. Left side had 800 mL drained and right side had 1000 mL drained.     Patient was fluid overloaded and treated with diuretics. He follows closely with the Cardiology heart failure team and they will continue to adjust as necessary. Current LVAD settings:  Heartmate 3 LEFT VS  Flow (Lpm): 3.2 Lpm  Pulse Index (PI): 5 PI  Speed (rpm): 5100 rpm  Power (jackson): 3.3 jackson  Current Hct settin      Patient was transiently hyperglycemic and treated with insulin infusion then transitioned to sliding scale insulin per protocol. Blood sugars remained stable. No further glycemic control agents needed at this time.    Developed transient right eye visual deficit. Neurology consulted - recommend CT head and CTA head/neck to rule out stroke - without evidence of acute pathology/significant stenosis. Opthalmology consulted - no evidence of CRAO. Most likely diagnosis is atypical amaurosis vs possible impending CRVO.  Patient will need non-urgent follow up in ophthalmology clinic for repeat dilated exam, OCT macula each eye, and IVFA with transit right eye     Prior  "to discharge, his pain was controlled well, he was working well with therapies, able to perform most ADLs, ambulate with assistance, and had full return of bowel and bladder function.  On 2024, he was discharged to ARU in stable condition. Follow up with cardiology and cardiac surgery have been arranged. Pt encouraged to follow up with PCP and cardiac rehab upon discharge.    Patient discharged on aspirin:  Not indicated   Patient discharged on beta blocker: no, deferred by heart failure cardiology    Patient discharged on ACE Inhibitor/ARB: no, deferred by heart failure cardiology while other medical therapy is prioritized     Patient discharged on statin: yes          Discharge Disposition:     Discharged to ARU            Condition on Discharge:     Discharge condition: Stable   Discharge vitals: Blood pressure 91/75, pulse 77, temperature 97.2  F (36.2  C), temperature source Oral, resp. rate 16, height 1.676 m (5' 5.98\"), weight 66.2 kg (145 lb 14.4 oz), SpO2 97%.   Code status on discharge: Full Code     Vitals:    10/03/24 0400 10/04/24 0436 10/05/24 0500   Weight: 68.9 kg (152 lb) 67.5 kg (148 lb 14.4 oz) 66.2 kg (145 lb 14.4 oz)       DAY OF DISCHARGE PHYSICAL EXAM:    Gen: A&Ox4, NAD  Neuro: no focal deficits   CV: LVAD hum RRR, NSR on bedside telemetry.   Pulm: CTA, no wheezing or rhonchi, normal breathing on RA.  Abd: nondistended, normal BS, soft, nontender  Ext: Trace peripheral edema.  Incision: clean, dry, intact, no erythema, sternum stable  Tubes/drain sites: dressing clean, no erythema, scant amount of drainage.  Lines: driveline dressing clean and dry     LVAD:   Heartmate 3 LEFT VS  Flow (Lpm): 3.2 Lpm  Pulse Index (PI): 5 PI  Speed (rpm): 5100 rpm  Power (jackson): 3.3 jackson  Current Hct settin        LABS  Most Recent 3 CBC's:  Recent Labs   Lab Test 10/05/24  0527 10/04/24  0447 10/03/24  0413   WBC 6.6 7.3 7.4   HGB 8.1* 8.4* 8.3*   MCV 97 98 96    244 273      Most " Recent 3 BMP's:  Recent Labs   Lab Test 10/05/24  0527 10/04/24  0447 10/03/24  0413   * 132* 130*   POTASSIUM 4.0 4.4 4.1   CHLORIDE 100 100 98   CO2 25 24 25   BUN 16.0 13.9 13.0   CR 0.72 0.69 0.68   ANIONGAP 6* 8 7   PRANAV 8.0* 8.0* 8.0*   GLC 77 81 80     Most Recent 2 LFT's:  Recent Labs   Lab Test 09/27/24 0441 09/26/24  0504   AST 34 34   ALT 28 29   ALKPHOS 76 79   BILITOTAL 0.3 0.3     Anticoagulation Dose History  More data exists         Latest Ref Rng & Units 9/29/2024 9/30/2024 10/1/2024 10/2/2024 10/3/2024 10/4/2024 10/5/2024   Recent Dosing and Labs   warfarin ANTICOAGULANT (COUMADIN) 1 MG tablet - 1 mg, $Given - - 1 mg, $Given - - -   warfarin ANTICOAGULANT (COUMADIN) 1.5 mg TABS half-tab - - - 1.5 mg, $Given - 1.5 mg, $Given 1.5 mg, $Given -   warfarin ANTICOAGULANT (COUMADIN) 2 MG tablet - - 2 mg, $Given - - - - -   INR 0.85 - 1.15 3.39  3.08  3.14  3.34  2.77  2.55  2.34       Details                    Most Recent 3 Troponin's:No lab results found.  Most Recent Cholesterol Panel:  Recent Labs   Lab Test 09/04/24  0626   CHOL 85   LDL 45   HDL 29*   TRIG 56     Most Recent 6 Bacteria Isolates From Any Culture (See EPIC Reports for Culture Details):No lab results found.  Most Recent TSH, T4 and A1c Labs:  Recent Labs   Lab Test 09/18/24  1451 09/04/24  0626 08/24/24  1302 12/30/23  1506   TSH  --  2.45   < > 5.24*   T4  --   --   --  1.49   A1C 5.4  --   --   --     < > = values in this interval not displayed.      Recent Labs   Lab 10/05/24  0527 10/04/24  0447 10/03/24  0413 10/02/24  0500 10/01/24  0427 09/30/24  0414   GLC 77 81 80 88 84 85       Imaging:  EXAM: XR CHEST PORT 1 VIEW 10/3/2024 10:06 AM     INDICATION: sp R thoracentesis     COMPARISON: Same day chest radiograph 6:18     TECHNIQUE: Single portable semiupright AP view of the chest.      FINDINGS:   Stable position of support devices, including right upper extremity  PICC, left chest implantable cardiac defibrillator, LVAD,  and  sternotomy wires. Increased retrocardiac opacities. Improved aeration  of the right lung base. Stable cardiomegaly. No evidence of  pneumothorax or focal consolidation.                                                                      IMPRESSION:   1. Increased retrocardiac opacities which could represent left basilar  atelectasis versus small pleural effusion.  2. Improved aeration of the right lung base status post thoracentesis  with resolution of right pleural effusion. No pneumothorax.    10/1/24 Echocardiogram:  Interpretation Summary  HM3 at 5200RPM. Speed optimization study. Please refer to EPCI for details.     LVIDd 40mm.  Septum normal.  Aortic valve opens mildly every other beat. No AI.  Normal inflow velocities.  Global right ventricular function is moderately reduced.  Moderate right ventricular dilation is present.  The inferior vena cava cannot be assessed.     This study was compared with the study from 9/27/2024 .No significant changes  noted.    PRE-ADMISSION MEDICATIONS:  Medications Prior to Admission   Medication Sig Dispense Refill Last Dose    amiodarone (PACERONE) 200 MG tablet Take 1 tablet (200 mg) by mouth daily 90 tablet 3 8/30/2024    atorvastatin (LIPITOR) 80 MG tablet Take 1 tablet (80 mg) by mouth daily 90 tablet 3 8/30/2024    FISH OIL-VITAMIN D PO Take 1 capsule by mouth 2 times daily.   8/30/2024    magnesium oxide (MAG-OX) 400 MG tablet Take 1 tablet (400 mg) by mouth daily 90 tablet 3 8/30/2024    melatonin 5 MG tablet Take 1-2 tablets (5-10 mg) by mouth at bedtime 60 tablet 0 8/29/2024    multivitamin w/minerals (THERA-VIT-M) tablet Take 1 tablet by mouth 2 times daily   8/30/2024    empagliflozin (JARDIANCE) 10 MG TABS tablet Take 1 tablet (10 mg) by mouth daily 90 tablet 3 8/29/2024 at On hold    [DISCONTINUED] acetaminophen (TYLENOL 8 HOUR ARTHRITIS PAIN) 650 MG CR tablet Take 650-1,300 mg by mouth every 8 hours as needed for pain.   PRN    [DISCONTINUED] bumetanide  (BUMEX) 1 MG tablet Take 1 mg by mouth daily as needed (for weight increase or leg swelling).   PRN    [DISCONTINUED] cetirizine (ZYRTEC) 10 MG tablet Take 10 mg by mouth daily   8/30/2024    [DISCONTINUED] clopidogrel (PLAVIX) 75 MG tablet Take 1 tablet (75 mg) by mouth daily 30 tablet 3 8/30/2024    [DISCONTINUED] lisinopril (ZESTRIL) 2.5 MG tablet Take 1 tablet (2.5 mg) by mouth daily HOLD for systolic blood pressure < 90 90 tablet 1 8/30/2024    [DISCONTINUED] metoprolol succinate ER (TOPROL XL) 25 MG 24 hr tablet Take 0.5 tablets (12.5 mg) by mouth daily Hold for systolic blood pressure less than 90. 45 tablet 1 8/29/2024    [DISCONTINUED] potassium chloride tray ER (KLOR-CON M20) 20 MEQ CR tablet TAKE ONE TABLET BY MOUTH ONCE DAILY 30 tablet 2 8/29/2024    [DISCONTINUED] vitamin C (ASCORBIC ACID) 1000 MG TABS Take 1,000 mg by mouth 2 times daily.   8/30/2024       DISCHARGE MEDICATIONS:   Current Discharge Medication List        START taking these medications    Details   !! acetaminophen (TYLENOL) 325 MG tablet Take 3 tablets (975 mg) by mouth every 8 hours.    Associated Diagnoses: LVAD (left ventricular assist device) present (H)      !! acetaminophen (TYLENOL) 325 MG tablet Take 2 tablets (650 mg) by mouth every 4 hours as needed for other (For optimal non-opioid multimodal pain management to improve pain control.).    Associated Diagnoses: LVAD (left ventricular assist device) present (H)      bisacodyl (DULCOLAX) 10 MG suppository Place 1 suppository (10 mg) rectally daily as needed for constipation (Use if magnesium hydroxide (MILK of MAGNESIA) is not effective after 24 hours. May discontinue if patient having bowel movement.).    Associated Diagnoses: LVAD (left ventricular assist device) present (H)      escitalopram (LEXAPRO) 10 MG tablet Take 1 tablet (10 mg) by mouth or Feeding Tube daily.    Associated Diagnoses: LVAD (left ventricular assist device) present (H)      gabapentin (NEURONTIN) 100 MG  capsule Take 1 capsule (100 mg) by mouth or Feeding Tube at bedtime.    Associated Diagnoses: LVAD (left ventricular assist device) present (H)      hydrALAZINE (APRESOLINE) 50 MG tablet Take 1 tablet (50 mg) by mouth 3 times daily.    Associated Diagnoses: LVAD (left ventricular assist device) present (H)      losartan (COZAAR) 50 MG tablet Take 1 tablet (50 mg) by mouth 2 times daily.    Associated Diagnoses: LVAD (left ventricular assist device) present (H)      magnesium hydroxide (MILK OF MAGNESIA) 400 MG/5ML suspension Take 30 mLs by mouth daily as needed for constipation (Use if polyethylene glycol (Miralax) is not effective after 24 hours.).    Associated Diagnoses: LVAD (left ventricular assist device) present (H)      !! melatonin 10 MG TABS tablet Take 1 tablet (10 mg) by mouth every evening.    Associated Diagnoses: LVAD (left ventricular assist device) present (H)      methocarbamol (ROBAXIN) 750 MG tablet Take 1 tablet (750 mg) by mouth 3 times daily as needed for muscle spasms.    Associated Diagnoses: LVAD (left ventricular assist device) present (H)      !! naloxone (NARCAN) 0.4 MG/ML injection Inject 1 mL (0.4 mg) into the muscle as needed for opioid reversal.    Associated Diagnoses: LVAD (left ventricular assist device) present (H)      !! naloxone (NARCAN) 0.4 MG/ML injection Inject 0.5 mLs (0.2 mg) into the muscle as needed for opioid reversal.    Associated Diagnoses: LVAD (left ventricular assist device) present (H)      !! naloxone (NARCAN) 0.4 MG/ML injection Inject 0.5 mLs (0.2 mg) into the vein as needed for opioid reversal.    Associated Diagnoses: LVAD (left ventricular assist device) present (H)      !! naloxone (NARCAN) 0.4 MG/ML injection Inject 1 mL (0.4 mg) into the vein as needed for opioid reversal.    Associated Diagnoses: LVAD (left ventricular assist device) present (H)      ondansetron (ZOFRAN ODT) 4 MG ODT tab Take 1 tablet (4 mg) by mouth every 6 hours as needed for nausea  or vomiting.    Associated Diagnoses: LVAD (left ventricular assist device) present (H)      oxyCODONE (ROXICODONE) 5 MG tablet Take 1 tablet (5 mg) by mouth every 4 hours as needed for moderate pain.    Associated Diagnoses: LVAD (left ventricular assist device) present (H)      pantoprazole (PROTONIX) 40 MG EC tablet Take 1 tablet (40 mg) by mouth every morning (before breakfast) for 12 days.    Associated Diagnoses: LVAD (left ventricular assist device) present (H)      polyethylene glycol (MIRALAX) 17 g packet Take 17 g by mouth or Feeding Tube 2 times daily as needed for constipation.    Associated Diagnoses: LVAD (left ventricular assist device) present (H)      senna-docusate (SENOKOT-S/PERICOLACE) 8.6-50 MG tablet Take 2 tablets by mouth or Feeding Tube 2 times daily as needed for constipation.    Associated Diagnoses: LVAD (left ventricular assist device) present (H)       !! - Potential duplicate medications found. Please discuss with provider.        CONTINUE these medications which have CHANGED    Details   bumetanide (BUMEX) 0.5 MG tablet Take 1 tablet (0.5 mg) by mouth daily.    Associated Diagnoses: LVAD (left ventricular assist device) present (H)           CONTINUE these medications which have NOT CHANGED    Details   amiodarone (PACERONE) 200 MG tablet Take 1 tablet (200 mg) by mouth daily  Qty: 90 tablet, Refills: 3    Associated Diagnoses: Cardiac arrest (H)      atorvastatin (LIPITOR) 80 MG tablet Take 1 tablet (80 mg) by mouth daily  Qty: 90 tablet, Refills: 3    Associated Diagnoses: ST elevation myocardial infarction involving left anterior descending (LAD) coronary artery (H)      FISH OIL-VITAMIN D PO Take 1 capsule by mouth 2 times daily.      magnesium oxide (MAG-OX) 400 MG tablet Take 1 tablet (400 mg) by mouth daily  Qty: 90 tablet, Refills: 3    Associated Diagnoses: Hypomagnesemia      !! melatonin 5 MG tablet Take 1-2 tablets (5-10 mg) by mouth at bedtime  Qty: 60 tablet, Refills: 0     Associated Diagnoses: Anxiety      multivitamin w/minerals (THERA-VIT-M) tablet Take 1 tablet by mouth 2 times daily      empagliflozin (JARDIANCE) 10 MG TABS tablet Take 1 tablet (10 mg) by mouth daily  Qty: 90 tablet, Refills: 3    Associated Diagnoses: Chronic combined systolic and diastolic heart failure (H)       !! - Potential duplicate medications found. Please discuss with provider.        STOP taking these medications       acetaminophen (TYLENOL 8 HOUR ARTHRITIS PAIN) 650 MG CR tablet Comments:   Reason for Stopping:         cetirizine (ZYRTEC) 10 MG tablet Comments:   Reason for Stopping:         clopidogrel (PLAVIX) 75 MG tablet Comments:   Reason for Stopping:         lisinopril (ZESTRIL) 2.5 MG tablet Comments:   Reason for Stopping:         metoprolol succinate ER (TOPROL XL) 25 MG 24 hr tablet Comments:   Reason for Stopping:         potassium chloride tray ER (KLOR-CON M20) 20 MEQ CR tablet Comments:   Reason for Stopping:         vitamin C (ASCORBIC ACID) 1000 MG TABS Comments:   Reason for Stopping:                CC:Jose Davis DNP APRN CNP CCRN   Cardiovascular Surgery   Pager [phone]      [facility]   Cardiothoracic Surgery  Office phone: [phone]  Office fax: [phone]

## 2024-10-02 NOTE — CONSULTS
"      [facility] Consult Service Note  Interventional Radiology  10/02/24   1:13 PM    Consult Requested: \"Right pleural effusion\"    Recommendations/Plan:    IR will not pursue a thoracentesis on this patient in the setting of elevated INR (3.34). Would consider pursuing right thora in setting of INR 3 or less per SIR guidelines.    Recommend CAPS evaluation in AM if patient's INR is less than 3. Re-consult IR if needed.     Case and imaging discussed with IR attending Dr. Jimenez Benton MD   Recommendations were reviewed with Pratima Geiger    History of Present Illness:  Duane C Johnson is a 74 year old male with a PMH of  HFrEF 2/2 ICM with LVEF 20-30%, atrial fibrillation, 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 Due to worsening functional limitations due to heart failure, patient was worked up for VAD while inpatient. Patient is now s/p implantation of HeartMate 3 LVAD on 2024 with Dr. Mulvihill. Returned to OR on POD 0 for mediastinal re-exploration, mediastinal clot evacuation, and chest washout. Patient had a thoracentesis completed by CAPS on 24 with 1 L removed from left. CXR from today demonstrates a small right sided effusion. He is not on any oxygen and saturations are around 93-94%.       Pertinent Imaging Reviewed:   CXR 10/2/24  Recent Results (from the past 24 hour(s))   Echo Complete   Result Value    LVEF  <30% (severely reduced)    [facility]    [id number]  [id number]  [id number]  [id number]^DARCI^BIB     [facility],[place]  Echocardiography Laboratory  [address] [place] [id number]     Name: JOHNSON, DUANE C  MRN: [id number]  : 1950  Study Date: 10/01/2024 01:24 PM  Age: 74 yrs  Gender: Male  [place] [facility] [place]  Reason For Study: Cardiac Device, Unspecified  Ordering Physician: DARCI APPIAH  Performed By: Ag SCHWARTZ" Ric     BSA: 1.8 m2  Height: 65 in  Weight: 155 lb  ______________________________________________________________________________  Procedure  LVAD Echocardiogram Turndown.  ______________________________________________________________________________  Interpretation Summary  HM3 at 5200RPM. Speed optimization study. Please refer to EPCI for details.     LVIDd 40mm.  Septum normal.  Aortic valve opens mildly every other beat. No AI.  Normal inflow velocities.  Global right ventricular function is moderately reduced.  Moderate right ventricular dilation is present.  The inferior vena cava cannot be assessed.     This study was compared with the study from 9/27/2024 .No significant changes  noted.  ______________________________________________________________________________  Left Ventricle  Left ventricular size is normal. Relative wall thickness is increased  consistent with concentric remodeling. Left ventricular function is decreased.  The ejection fraction is <30% (severely reduced). Diastolic function not  assessed due to presence of LVAD. Severe diffuse hypokinesis is present.     Right Ventricle  Moderate right ventricular dilation is present. Global right ventricular  function is moderately reduced.     Atria  The left atrium appears normal. Moderate right atrial enlargement is present.     Mitral Valve  The mitral valve is normal.     Aortic Valve  No aortic regurgitation is present. Aortic valve opens mildly every other  beat.     Tricuspid Valve  The tricuspid valve is normal. Trace tricuspid insufficiency is present. The  right ventricular systolic pressure is approximated at 19.8 mmHg plus the  right atrial pressure.     Pulmonic Valve  The pulmonic valve is normal.     Vessels  The inferior vena cava cannot be assessed.     Pericardium  No pericardial effusion is present.     Compared to Previous Study  This study was compared with the study from 9/27/2024 . No significant  "changes  noted.  ______________________________________________________________________________  MMode/2D Measurements & Calculations  IVSd: 1.1 cm  LVIDd: 4.0 cm  LVIDs: 3.4 cm  LVPWd: 1.3 cm  FS: 14.9 %  LV mass(C)d: 163.6 grams  LV mass(C)dI: 92.2 grams/m2  RWT: 0.65     Doppler Measurements & Calculations  TR max fracnisco j: 222.3 cm/sec  TR max P.8 mmHg     ______________________________________________________________________________  Report approved by: Jeanine MEJIA 10/01/2024 02:52 PM         XR Chest Port 1 View    Narrative    Exam: XR CHEST PORT 1 VIEW, 10/2/2024 6:27 AM    Comparison: Radiographs 10/1/2024    History: chest tubes s/p LVAD insertion    Findings:  Portable AP view of the chest. Stable support devices including right  upper extremity PICC line, left chest wall ICD, and LVAD. Postsurgical  changes of the chest with intact median sternotomy wires. Stable  enlarged cardiomediastinal silhouette. Aortic arch calcifications.  Decreased hazy bibasilar opacities. Persistent small right pleural  effusion. Left costophrenic angle is obscured by the LVAD. No  significant left pleural effusion identified.      Impression    Impression:   1. Decreased hazy bibasilar opacities.  2. Persistent small right pleural effusion. No significant left  pleural effusion visualized.  3. Stable support devices.    I have personally reviewed the examination and initial interpretation  and I agree with the findings.    YESENIA HINTON MD         SYSTEM ID:  [id number]       Expected date of discharge:  10/03/2024    Vitals:   BP 99/72   Pulse 78   Temp 98.2  F (36.8  C) (Oral)   Resp 16   Ht 1.676 m (5' 5.98\")   Wt 70.9 kg (156 lb 3.2 oz)   SpO2 99%   BMI 25.22 kg/m      Pertinent Labs Reviewed:  CBC:  Lab Results   Component Value Date    WBC 8.5 10/02/2024    WBC 7.5 10/01/2024    WBC 9.3 2024    WBC 12.2 (H) 2021    WBC 6.3 2018    WBC 5.3 2017     Lab Results   Component Value Date    " HGB 8.2 10/02/2024    HGB 7.5 10/01/2024    HGB 7.9 09/30/2024    HGB 12.0 05/26/2021    HGB 13.9 03/20/2018    HGB 14.6 02/23/2017     Lab Results   Component Value Date     10/02/2024     10/01/2024     09/30/2024     05/26/2021     03/20/2018     02/23/2017    INR:  Lab Results   Component Value Date    INR 3.34 (H) 10/02/2024    INR 1.15 (H) 05/26/2021     (H) 09/21/2024          COVID Results:  COVID-19 Antibody Results, Testing for Immunity           No data to display              COVID-19 PCR Results          11/12/2023    23:18 12/30/2023    15:39 8/24/2024    13:24 8/26/2024    12:04   COVID-19 PCR Results   SARS CoV2 PCR Negative  Negative  Negative  Negative     Potassium   Date Value Ref Range Status   10/02/2024 4.1 3.4 - 5.3 mmol/L Final   05/26/2021 4.5 3.4 - 5.3 mmol/L Final     Potassium Whole Blood   Date Value Ref Range Status   09/23/2024 2.5 (LL) 3.4 - 5.3 mmol/L Final          Carmen Espinoza PA-C  Interventional Radiology  Pager: [phone]

## 2024-10-02 NOTE — PROGRESS NOTES
Neuro: A&Ox4.   Cardiac: Afebrile, SR, VSS. HM3 numbers WNL,    Respiratory: RA   GI/: Voiding spontaneously. No BM this shift.   Diet/appetite: Tolerating regular diet. Denies nausea   Activity: Up with 1 assist    Pain: . Left thoracentesis site, see MAR  Skin: No new deficits noted.  Lines: HM3  Drains:  Replacement: Mag 1.8, replaced    Pt has been resting comfortably throughout night, will continue to monitor and follow plan of care.

## 2024-10-02 NOTE — PROGRESS NOTES
"BP 99/72   Pulse 78   Temp 98.2  F (36.8  C) (Oral)   Resp 16   Ht 1.676 m (5' 5.98\")   Wt 70.9 kg (156 lb 3.2 oz)   SpO2 99%   BMI 25.22 kg/m      [id number]  Neuro: A&Ox4.   Cardiac: Afebrile, VSS. HM 3 LVAD.No alarms RPM changed from 5200 to 5100. MAPS goal 65-85. Today's MAPS were 81,82  Respiratory: On RA.Pulse ox Spot checks Q4hrs.  GI/: Voiding spontaneously. LBM  10/1.   Diet/appetite: Mechanical/dental soft. Poor po intake. Denies nausea.   Activity: Up with 1xA. Up in hallways w/PT.    Pain:Mild discomfort per pt in L rib cage (thora site), and back. Scheduled Tylenol and PRN oxy given x1.  Skin: No new deficits noted.  Lines: 2 DL R PICC, R and L PIV SL.  Replacement: Mag replaced, labs.Recheck in the AM.   Notify CARDS 2 HF for any changes Will continue to monitor and follow plan of care.    Plan: Anticipated discharge today, pending R thoracentesis for R pleural effusion.   "

## 2024-10-02 NOTE — PROGRESS NOTES
CLINICAL NUTRITION SERVICES - REASSESSMENT NOTE     Nutrition Prescription    RECOMMENDATIONS FOR MDs/PROVIDERS TO ORDER:  Encourage PO efforts    Malnutrition Status:    Moderate malnutrition in the context of chronic illness/disease    Recommendations already ordered by Registered Dietitian (RD):  Encourage small, frequent PO attempts with high protein sources  Ensure, magic cup and Core Power drink 42g Pro daily.   Encourage 3 meals/day  Continue Thera-vit  Future/Additional Recommendations:  Monitor nutrition related findings and follow up per protocol       EVALUATION OF THE PROGRESS TOWARD GOALS   Diet:  mechanical soft    Intake/Tolerance: Patient consuming  % of 2 meal(s) daily.     NEW FINDINGS   Met with pt at bedside. Visit kept brief d/t pt taking a phone call. Reports fair appetite. Primarily ordering 2 meals/day of soup and mashed potatoes. Pt reports he is limited in what he can eat d/t missing teeth. Reviewed soft protein sources, however, pt prefers to stick to soup and mashed potatoes at this time. Pt reports he is continuing to drink Ensure and Core Power drinks. Encouraged continued emphasis on protein.    GI:  Last BM: 10/01/24  Has PRN bowel med(s)    Weight:  Most Recent Weight: 70.9 kg (156 lb 3.2 oz)  on 10/2/24 via Standing scale  Body mass index is 25.22 kg/m .  Wt up 4.9 kg from admission.    Meds:  Lipitor  Bumex  Iron  Melatonin  Thera-vit  Protonix  warfarin    Labs:  Na 128      Skin:  reviewed    MALNUTRITION  % Intake: Decreased intake does not meet criteria  % Weight Loss: None noted  Subcutaneous Fat Loss: Upper arm:  mild and Lower arm:  mild  Muscle Loss: Temporal:  severe, Thoracic region (clavicle, acromium bone, deltoid, trapezius, pectoral):  moderate, Upper arm (bicep, tricep):  moderate, Lower arm  (forearm):  mild, Upper leg (quadricep, hamstring):  moderate, and Posterior calf:  severe  Fluid Accumulation/Edema: None noted  Malnutrition Diagnosis: Moderate  malnutrition in the context of chronic illness    Previous Goals   Total avg nutritional intake to meet a minimum of 25 kcal/kg and 1.5 g PRO/kg daily (per dosing wt 62 kg).   Evaluation: Not met but improving since last assessment    Previous Nutrition Diagnosis  Inadequate oral intake related to intubation as evidenced by NPO status and need for EN to meet nutrition needs at this time.     Evaluation: Improving    CURRENT NUTRITION DIAGNOSIS  Increased nutrient needs  related to increased metabolic demands as evidenced by acute period post-op LVAD      INTERVENTIONS  Implementation  Medical food supplement therapy     Goals  Patient to consume % of nutritionally adequate meal trays TID, or the equivalent with supplements/snacks.    Monitoring/Evaluation  Progress toward goals will be monitored and evaluated per protocol.    Malina Suresh MS, RD, LD, CNSC    6C (beds 0044-6212) + 7C (beds 6247-4296) + ED   Available in [facility] by name or unit dietitian

## 2024-10-03 ENCOUNTER — APPOINTMENT (OUTPATIENT)
Dept: GENERAL RADIOLOGY | Facility: CLINIC | Age: 74
DRG: 001 | End: 2024-10-03
Attending: STUDENT IN AN ORGANIZED HEALTH CARE EDUCATION/TRAINING PROGRAM
Payer: MEDICARE

## 2024-10-03 ENCOUNTER — APPOINTMENT (OUTPATIENT)
Dept: PHYSICAL THERAPY | Facility: CLINIC | Age: 74
DRG: 001 | End: 2024-10-03
Attending: STUDENT IN AN ORGANIZED HEALTH CARE EDUCATION/TRAINING PROGRAM
Payer: MEDICARE

## 2024-10-03 ENCOUNTER — APPOINTMENT (OUTPATIENT)
Dept: OCCUPATIONAL THERAPY | Facility: CLINIC | Age: 74
DRG: 001 | End: 2024-10-03
Attending: STUDENT IN AN ORGANIZED HEALTH CARE EDUCATION/TRAINING PROGRAM
Payer: MEDICARE

## 2024-10-03 LAB
ANION GAP SERPL CALCULATED.3IONS-SCNC: 7 MMOL/L (ref 7–15)
BUN SERPL-MCNC: 13 MG/DL (ref 8–23)
CALCIUM SERPL-MCNC: 8 MG/DL (ref 8.8–10.4)
CHLORIDE SERPL-SCNC: 98 MMOL/L (ref 98–107)
CREAT SERPL-MCNC: 0.68 MG/DL (ref 0.67–1.17)
EGFRCR SERPLBLD CKD-EPI 2021: >90 ML/MIN/1.73M2
ERYTHROCYTE [DISTWIDTH] IN BLOOD BY AUTOMATED COUNT: 18 % (ref 10–15)
GLUCOSE SERPL-MCNC: 80 MG/DL (ref 70–99)
HCO3 SERPL-SCNC: 25 MMOL/L (ref 22–29)
HCT VFR BLD AUTO: 26.3 % (ref 40–53)
HGB BLD-MCNC: 8.3 G/DL (ref 13.3–17.7)
INR PPP: 2.77 (ref 0.85–1.15)
MAGNESIUM SERPL-MCNC: 1.8 MG/DL (ref 1.7–2.3)
MCH RBC QN AUTO: 30.4 PG (ref 26.5–33)
MCHC RBC AUTO-ENTMCNC: 31.6 G/DL (ref 31.5–36.5)
MCV RBC AUTO: 96 FL (ref 78–100)
PHOSPHATE SERPL-MCNC: 2.9 MG/DL (ref 2.5–4.5)
PLATELET # BLD AUTO: 273 10E3/UL (ref 150–450)
POTASSIUM SERPL-SCNC: 4.1 MMOL/L (ref 3.4–5.3)
RBC # BLD AUTO: 2.73 10E6/UL (ref 4.4–5.9)
SODIUM SERPL-SCNC: 130 MMOL/L (ref 135–145)
WBC # BLD AUTO: 7.4 10E3/UL (ref 4–11)

## 2024-10-03 PROCEDURE — 97129 THER IVNTJ 1ST 15 MIN: CPT | Mod: GO

## 2024-10-03 PROCEDURE — 83735 ASSAY OF MAGNESIUM: CPT

## 2024-10-03 PROCEDURE — 250N000011 HC RX IP 250 OP 636

## 2024-10-03 PROCEDURE — 71045 X-RAY EXAM CHEST 1 VIEW: CPT | Mod: 26 | Performed by: RADIOLOGY

## 2024-10-03 PROCEDURE — 84100 ASSAY OF PHOSPHORUS: CPT

## 2024-10-03 PROCEDURE — 250N000013 HC RX MED GY IP 250 OP 250 PS 637: Performed by: NURSE PRACTITIONER

## 2024-10-03 PROCEDURE — 97530 THERAPEUTIC ACTIVITIES: CPT | Mod: GP | Performed by: REHABILITATION PRACTITIONER

## 2024-10-03 PROCEDURE — 32555 ASPIRATE PLEURA W/ IMAGING: CPT | Performed by: STUDENT IN AN ORGANIZED HEALTH CARE EDUCATION/TRAINING PROGRAM

## 2024-10-03 PROCEDURE — 85027 COMPLETE CBC AUTOMATED: CPT

## 2024-10-03 PROCEDURE — 250N000013 HC RX MED GY IP 250 OP 250 PS 637: Performed by: INTERNAL MEDICINE

## 2024-10-03 PROCEDURE — 999N000065 XR CHEST PORT 1 VIEW

## 2024-10-03 PROCEDURE — 85610 PROTHROMBIN TIME: CPT

## 2024-10-03 PROCEDURE — 97116 GAIT TRAINING THERAPY: CPT | Mod: GP | Performed by: REHABILITATION PRACTITIONER

## 2024-10-03 PROCEDURE — 120N000003 HC R&B IMCU UMMC

## 2024-10-03 PROCEDURE — 99231 SBSQ HOSP IP/OBS SF/LOW 25: CPT | Mod: 24 | Performed by: NURSE PRACTITIONER

## 2024-10-03 PROCEDURE — 97535 SELF CARE MNGMENT TRAINING: CPT | Mod: GO

## 2024-10-03 PROCEDURE — 274N000006 HC DEVICE HM POCKET SHOWER BAG, INITIAL ONLY, EACH

## 2024-10-03 PROCEDURE — 250N000013 HC RX MED GY IP 250 OP 250 PS 637: Performed by: PHYSICIAN ASSISTANT

## 2024-10-03 PROCEDURE — 0W993ZZ DRAINAGE OF RIGHT PLEURAL CAVITY, PERCUTANEOUS APPROACH: ICD-10-PCS | Performed by: STUDENT IN AN ORGANIZED HEALTH CARE EDUCATION/TRAINING PROGRAM

## 2024-10-03 PROCEDURE — 250N000011 HC RX IP 250 OP 636: Performed by: STUDENT IN AN ORGANIZED HEALTH CARE EDUCATION/TRAINING PROGRAM

## 2024-10-03 PROCEDURE — 80048 BASIC METABOLIC PNL TOTAL CA: CPT | Performed by: NURSE PRACTITIONER

## 2024-10-03 RX ORDER — HYDRALAZINE HYDROCHLORIDE 50 MG/1
50 TABLET, FILM COATED ORAL 3 TIMES DAILY
Status: DISCONTINUED | OUTPATIENT
Start: 2024-10-03 | End: 2024-10-03

## 2024-10-03 RX ORDER — HYDRALAZINE HYDROCHLORIDE 20 MG/ML
10 INJECTION INTRAMUSCULAR; INTRAVENOUS ONCE
Status: COMPLETED | OUTPATIENT
Start: 2024-10-03 | End: 2024-10-03

## 2024-10-03 RX ORDER — HYDRALAZINE HYDROCHLORIDE 50 MG/1
50 TABLET, FILM COATED ORAL 3 TIMES DAILY
Status: DISCONTINUED | OUTPATIENT
Start: 2024-10-03 | End: 2024-10-05 | Stop reason: HOSPADM

## 2024-10-03 RX ORDER — MAGNESIUM OXIDE 400 MG/1
400 TABLET ORAL EVERY 4 HOURS
Status: COMPLETED | OUTPATIENT
Start: 2024-10-03 | End: 2024-10-03

## 2024-10-03 RX ADMIN — METHOCARBAMOL TABLETS 750 MG: 750 TABLET, COATED ORAL at 15:24

## 2024-10-03 RX ADMIN — LOSARTAN POTASSIUM 50 MG: 50 TABLET, FILM COATED ORAL at 06:27

## 2024-10-03 RX ADMIN — ESCITALOPRAM OXALATE 10 MG: 10 TABLET ORAL at 08:48

## 2024-10-03 RX ADMIN — MAGNESIUM OXIDE TAB 400 MG (241.3 MG ELEMENTAL MG) 400 MG: 400 (241.3 MG) TAB at 06:27

## 2024-10-03 RX ADMIN — THERA TABS 1 TABLET: TAB at 08:48

## 2024-10-03 RX ADMIN — HYDRALAZINE HYDROCHLORIDE 10 MG: 20 INJECTION INTRAMUSCULAR; INTRAVENOUS at 05:39

## 2024-10-03 RX ADMIN — METHOCARBAMOL TABLETS 750 MG: 750 TABLET, COATED ORAL at 20:19

## 2024-10-03 RX ADMIN — ACETAMINOPHEN 975 MG: 325 TABLET ORAL at 08:48

## 2024-10-03 RX ADMIN — HYDRALAZINE HYDROCHLORIDE 50 MG: 50 TABLET ORAL at 15:25

## 2024-10-03 RX ADMIN — WARFARIN SODIUM 1.5 MG: 3 TABLET ORAL at 17:52

## 2024-10-03 RX ADMIN — ATORVASTATIN CALCIUM 80 MG: 80 TABLET, FILM COATED ORAL at 20:19

## 2024-10-03 RX ADMIN — BUMETANIDE 1 MG: 1 TABLET ORAL at 08:47

## 2024-10-03 RX ADMIN — PANTOPRAZOLE SODIUM 40 MG: 40 TABLET, DELAYED RELEASE ORAL at 06:27

## 2024-10-03 RX ADMIN — ACETAMINOPHEN 975 MG: 325 TABLET ORAL at 15:24

## 2024-10-03 RX ADMIN — HEPARIN, PORCINE (PF) 10 UNIT/ML INTRAVENOUS SYRINGE 5 ML: at 04:14

## 2024-10-03 RX ADMIN — AMIODARONE HYDROCHLORIDE 200 MG: 200 TABLET ORAL at 06:27

## 2024-10-03 RX ADMIN — HYDRALAZINE HYDROCHLORIDE 50 MG: 50 TABLET ORAL at 11:59

## 2024-10-03 RX ADMIN — Medication 10 MG: at 22:09

## 2024-10-03 RX ADMIN — METHOCARBAMOL TABLETS 750 MG: 750 TABLET, COATED ORAL at 08:48

## 2024-10-03 RX ADMIN — Medication 5 ML: at 05:42

## 2024-10-03 RX ADMIN — HYDRALAZINE HYDROCHLORIDE 50 MG: 50 TABLET ORAL at 20:19

## 2024-10-03 RX ADMIN — GABAPENTIN 100 MG: 100 CAPSULE ORAL at 22:09

## 2024-10-03 RX ADMIN — LOSARTAN POTASSIUM 50 MG: 50 TABLET, FILM COATED ORAL at 20:19

## 2024-10-03 RX ADMIN — ACETAMINOPHEN 975 MG: 325 TABLET ORAL at 23:40

## 2024-10-03 ASSESSMENT — ACTIVITIES OF DAILY LIVING (ADL)
ADLS_ACUITY_SCORE: 31

## 2024-10-03 NOTE — PROGRESS NOTES
Post-VAD Social Work Services Progress Note    Date of Initial Social Work Evaluation: 09/05/2024  Collaborated with: PRO, [employer] [facility], [facility] ARU Liaison, Patient     Data: Duane received his LVAD on 9/18/24. Patient was extubated on 9/20 and transferred to  on 9/24. Patient has had all chest tubes removed. He had a left thora on 10/1/24 and had a right thora this morning. He is medically ready to discharge to ARU.  ARU following and will notify when bed is available. Patient is on their waitlist. No beds available today.     Intervention: SW collaborated with PRO, [employer] [facility] and [facility] Liaison to discuss medical readiness. Patient remains medically ready per team to discharge, though there are no ARU beds today. SW informed patient and patient's wife. SW confirmed patient's wife had address and phone number for ARU.    Assessment: Patient aware of plan of care. Pt agreeable to plan of care and discussed readiness to transition to ARU. Wife agreeable to plan of care. Denied additional questions or concerns at this time.  Education provided by SW: ongoing SW availability, discharge planning   Plan:    Discharge Plans in Progress:  ARU    Barriers to d/c plan: Bed availability    Follow up Plan: SW to continue to follow for any potential psychosocial concerns pre and post VAD implant. SW to connect with  Rehab liaison tomorrow to determine bed availability. Will set up transport when bed is available.     Jessi Almendarez, MSW, LGSW, APSW  Heart Transplant/MCS   Teams/Vocera  Ph. [phone]

## 2024-10-03 NOTE — PROCEDURES
[facility]  CAPS PROCEDURE NOTE  Date of Admission:  8/30/2024  Consult Requested by: Cardiothoracic Surgery  Reason for Consult: management of symptomatic pleural effusion    Indication/HPI: Pleural effusion    Pre-Procedure Diagnosis: Right Pleural Effusion    Post-Procedure Diagnosis: Right Pleural Effusion    Risk Assessment: Average risk, Technically straightforward; patient's anticoagulation has been held according to guidelines based on the agent and platelets and coags are within guidelines    Procedure Outcome:  Therapeutic thoracentesis performed with 1 liters removed. Discussed with primary team.  See additional procedure details below.    The primary covering service should follow up and address any lab results as appropriate.    Ryley Franco MD  [facility]  Securely message with Vocera (more info)  Text page via [employer] Paging/Directory   See signed in provider for up to date coverage information      [facility]    Thoracentesis at Bedside    Date/Time: 10/3/2024 1:45 PM    Performed by: Ryley Franco MD  Authorized by: Ryley Franco MD      UNIVERSAL PROTOCOL   Site Marked: Yes  Prior Images Obtained and Reviewed:  Yes  Required items: Required blood products, implants, devices and special equipment available    Patient identity confirmed:  Arm band, provided demographic data, verbally with patient and hospital-assigned identification number  NA - No sedation, light sedation, or local anesthesia  Confirmation Checklist:  Patient's identity using two indicators, relevant allergies, procedure was appropriate and matched the consent or emergent situation and correct equipment/implants were available  Time out: Immediately prior to the procedure a time out was called    Universal Protocol: the Joint Commission Universal Protocol was followed    Preparation:  Patient was prepped and draped in usual sterile fashion    ESBL (mL):  1    Procedure purpose: therapeutic  Indications: pleural effusion       ANESTHESIA    Anesthesia:  Local infiltration  Local Anesthetic:  Lidocaine 1% without epinephrine  Anesthetic Total (mL):  8      SEDATION    Patient Sedated: No      PROCEDURE DETAILS   Preparation: skin prepped with ChloraPrep  Patient position: sitting  Ultrasound guidance: yes  Location: right posterior  Intercostal space: 7th  Puncture method: over-the-needle catheter  Needle gauge: 19.  Catheter size: 5 Fr.  Drainage amount: 1000 ml  Drainage characteristics: serous  Chest x-ray performed: yes  Chest x-ray interpreted by me and radiologist.  Chest x-ray findings: No pneumothorax.      PROCEDURE    Length of time physician/provider present for 1:1 monitoring during sedation: 0        POC US Guide for Thorcentesis     Impression  Limited chest ultrasound was performed and demonstrated an adequate fluid collection on the right hemithorax.    Thoracentesis Indication:pleural effusion    Doppler of the skin demonstrated an area at this site without significant vasculature.  A thoracentesis at this site was subsequently performed.      Ryley Franco MD

## 2024-10-03 NOTE — PROGRESS NOTES
The patient's HeartMate LVAD was interrogated 10/3/2024  * Speed 5100 rpm   * Pulsatility index 5.7  * Power 3.3 Dahl   * Flow 3 L/minute   Fluid status: mild hypervolemia   Alarms were reviewed, and notable for rare pi events, no alarms.   The driveline exit site was inspected, c/d/i.   All external components were inspected and showed no evidence of damage or malfunction, none replaced.

## 2024-10-03 NOTE — PROGRESS NOTES
[facility]   Cardiology II Service / Advanced Heart Failure  Daily Consult Note      Patient: Duane C Johnson  MRN: [id number]  Admission Date: 8/30/2024  Hospital Day # 34    Assessment and Plan: Duane C Johnson is a 74 year old male pmhx of anterior STEMI s/p PCI to the pLAD on 10/26/23 with a VT/VF arrest the next day (10/27) with patent stents and unchanged anatomy on repeat angiogram. Placed on amiodarone and discharged 11/03/23, ICD was not indicated as this was within 48h of his MI. His EF prior to discharge was 20-30% with a large area of akinesis in the LAD, placed on apixaban due to this (Apixaban dcd on 7/5/24). Has had multiple admissions for HF exacerbation last year.  Admitted on 8/30/24 for CHF exacerbation with BNP 16k. CPX and RHC showed significant functional limitations due to heart failue. Underwent HM3 LVAD on 9/18/24.     Recommendations:  - Added 50 mg hydralazine TID for persistently elevated MAPs - NO IV PRNS  - Ongoing vad teaching, session today at 1000 with wife.  - Patient is medically ready for discharge from Cards 2 standpoint.     # Acute on chronic systolic heart failure secondary to ICM   # s/p HM3 LVAD as DT on 9/18/2024  # CAD s/p PCI  # History of STEMI  # s/p ICD 5/2024  Stage D. NYHA Class III.    Fluid status: Mild hypervolemia- evident on physical exam, continue 1 mg bumex daily.   ACEi/ARB/ARNi: 25 mg losartan BID increased to 50 mg BID due to elevated MAPs.   Vasodilator: Add hydralazine 50 mg TID for elevated MAPs  BB: Recent LVAD, will defer to outpatient  Aldosterone antagonist deferred while other medical therapy is prioritized  SGLT2i: Deferred while other medical therapy is prioritized  SCD prophylaxis: ICD  MAP: goal 65-85, current MAPs   LDH trends: 271, stable  Anticoagulation: warfarin dosing per pharmacy, INR goal 2-3, today 2.77.   Antiplatelet: ASA not indicated in LVAD population, > 6 months s/p STEMI  Limited TTE 9/27 with LVIDd 4.0 cm,  Closed AoV, no AI, moderately reduced RV, normal IVC. Multiple low flow alarms 10/1 received 50 mg hydralazine PO and 500 ml LR.     # Low flow alarms- Resolved  # HTN  Multiple low flow alarms 10/1, none reported 10/2. Verified that doppler measure is MAP vs. SBP.   - Speed optimization echo performed yesterday 10/1  - Losartan increased from 25 mg BID to 50 mg BID  - Add hydralazine 50 mg TID    # History of VT/VF arrest 2023  # AFib s/p DCCV 9/2024 and again 9/30/2024  - Successfully cardioverted in early September for AF. Since then EKG suggested AF on 9/21. Tele is only available from as early as 9/22. The patient was started on hep gtt 9/21, but unclear if was in AF before this. While the patient was on hep gtt until INR was therapeutic, it is not possible to assess rhythm prior to 9/21. Systemic AC was off on 9/18-9/20. S/p MELBA and DCCV on 9/30 with conversion to sinus rhythm   - continue amiodarone 200 mg daily   - Continue AC as above    # Visual changes, resolved. Stoke code call 9/29 for visual changes - right eye with decreased upper half of vision and bluish haze. Resolved after 30 minutes. Seen by opthalmology and neuro. Negative head CT and CTA head and neck. He has had 3 episodes which last about 5 minutes before resolving completely.   - Ophthalmology and Neuro, signed off  - Already on A/C  - Continue to monitor for changes    # Hyponatremic hypervolemia   Na 130, ranging 128-133  - encourage PO intake  - daily BMP  - Bumex 1 mg daily    # Anemia  - Hgb 8.3 10/3. No overt bleeds, slow downtrend since surgery. LDH downtrending.   - Iron studies suggestive of RADHA. Started on IV venofer x5 days    # Bilateral pleural effusions  - Chest tube removed 9/30.   - Left thoracentesis done 10/1, 1 L removed, R thoracentesis done 10/3, 1 L removed.       Time/Communication  I spent 45 minutes reviewing results, care team notes, meeting directly with the patient, coordinating care, and completing documentation  "on the day of service.     -Nellie Purvis NP Student    [facility] Cardiology  Advanced Heart Failure    Jody Moreira DNP, NP-C  Nurse Practitioner - Advanced Heart Failure/Cardiology II Service  [place] preferred or Pager [phone]    ================================================================    Subjective/24-Hr Events:   No acute events overnight. No lightheadedness or dizziness. No pre-syncope or syncope, no chest pain or SOB currently.     ROS:  4 point ROS including respiratory, CV, GI and  (other than that noted in the HPI) is negative.     Medications: Reviewed in EPIC.     Physical Exam:   BP 99/72   Pulse 77   Temp 98  F (36.7  C) (Oral)   Resp 16   Ht 1.676 m (5' 5.98\")   Wt 68.9 kg (152 lb)   SpO2 95%   BMI 24.55 kg/m      GENERAL: Appears comfortable, in no distress, just had thoracentesis done.   HEENT: Eye symmetrical, no discharge or icterus bilaterally.  NECK: Supple, JVD improved.   CV: + LVAD hum.   RESPIRATORY: Respirations regular, even, slightly labored. Lungs CTA throughout.    GI: Soft and non distended with normoactive bowel sounds present in all quadrants. No tenderness, rebound, guarding.   EXTREMITIES: Bilateral mild pedal edema. All extremities are warm and well perfused. Thready peripheral pulses present.   NEUROLOGIC: Alert and interacting appropriately. No focal deficits.   SKIN: No jaundice. No rashes or lesions. Sternum c/d/i    Labs:  CMP  Recent Labs   Lab 10/03/24  0413 10/02/24  0500 10/01/24  0427 09/30/24  0414 09/28/24  0450 09/27/24  0441   * 128* 129* 131*   < > 128*   POTASSIUM 4.1 4.1 4.1 4.3   < > 4.3   CHLORIDE 98 98 98 98   < > 97*   CO2 25 23 25 24   < > 25   ANIONGAP 7 7 6* 9   < > 6*   GLC 80 88 84 85   < > 91   BUN 13.0 14.8 15.2 17.1   < > 20.1   CR 0.68 0.67 0.67 0.63*   < > 0.64*   GFRESTIMATED >90 >90 >90 >90   < > >90   PRANAV 8.0* 7.9* 7.8* 7.9*   < > 7.8*   MAG 1.8 1.8 1.8 1.8   < > 1.9   PHOS 2.9 2.8 3.0 3.0   < > 3.0   PROTTOTAL  --   " --   --   --   --  5.3*   ALBUMIN  --   --   --   --   --  2.4*   BILITOTAL  --   --   --   --   --  0.3   ALKPHOS  --   --   --   --   --  76   AST  --   --   --   --   --  34   ALT  --   --   --   --   --  28    < > = values in this interval not displayed.       CBC  Recent Labs   Lab 10/03/24  0413 10/02/24  0500 10/01/24  0427 09/30/24  0414   WBC 7.4 8.5 7.5 9.3   RBC 2.73* 2.68* 2.50* 2.63*   HGB 8.3* 8.2* 7.5* 7.9*   HCT 26.3* 25.8* 24.1* 25.4*   MCV 96 96 96 97   MCH 30.4 30.6 30.0 30.0   MCHC 31.6 31.8 31.1* 31.1*   RDW 18.0* 17.2* 16.8* 16.7*    270 255 259       INR  Recent Labs   Lab 10/03/24  0413 10/02/24  0500 10/01/24  0427 09/30/24  0414   INR 2.77* 3.34* 3.14* 3.08*

## 2024-10-03 NOTE — PROGRESS NOTES
Rehab Admissions:  I met w/ Duane this morning to discuss [facility]. I discussed setup and structure of [facility] level of care w/ skilled PT/OT/edema, rehab nursing cares, ongoing LVAD training, and PMR & HOSP oversight of his medical and rehab needs. Discussed ELOS of 2-3 weeks, which pt stating he anticipates it will be on the longer side. Pt provided brochure w/ contact information, discussed visitor policy and parking options as well. Pt shared he is normally IND, enjoys engineering, electronics, and physics. Wife is his primary caregiver post LVAD and they will continue LVAD training while at [facility].     Thank you for the referral, we will continue to follow this patient for post acute placement.     Determination of admission is based upon the patient's need for an intensive, interdisciplinary approach to rehabilitation, their ability to progress, their ability to tolerate intensive therapies, their need for daily physician supervision, their need for twenty four hour nursing assistance, and their ability and willingness to participate in such a program.    Lorin Vazquez CM  Rehab Liaison/  [facility] [place] [facility]  10/3/2024    2:57 PM

## 2024-10-03 NOTE — PLAN OF CARE
Neuro: A/Ox4. Rambling speech at times.   Cardiac: SR with HR 70's. MAPS continue to be high. IV hydralazine x 1 this AM. Flow 2.7 when sleeping, no alarms.   Respiratory: O2 sats stable on RA  GI/: Voiding in urinal, No BM  Diet/appetite: Does not drink much, poor during day yesterday but at dinner.   Activity:  Up with assist of one and walker  Pain: Minimal back pain, Tylenol, Tal, Robaxin scheduled.  Skin: Sternal inc, old CT sites,   LDA's: Driveline CDI, PICC- HL'd, PIV    Plan: TCU soon. May still need R thoracentesis if INR <3. Continue with POC. Notify primary team with changes.    0610: MAP 98 post IV hydralazine. AM Amiodarone and Cozaar given early per MD.

## 2024-10-03 NOTE — PLAN OF CARE
Neuro: A&Ox4.   Cardiac: SR. VSS. High doppler MAPs resolved since addition of scheduled hydralazine today. LVAD #s WDL.   Respiratory: Sating upper 90s on RA.  GI/: Adequate urine output.   Diet/appetite: Tolerating mechanical soft diet. Eating well.  Activity:  SBA, up to chair and in halls.  Pain: At acceptable level on current regimen.   Skin: No new deficits noted. Driveline and old CT sites WDL.   LDA's: L PIV and double lumen R PICC SL, HM3 LVAD.     Plan: Continue with POC. Notify primary team with changes.     Ileana Norwood RN

## 2024-10-03 NOTE — PROGRESS NOTES
Cardiovascular Surgery Progress Note  10/03/2024         Assessment and Plan:     Duane C Johnson is a 74 year old male with a PMH of  HFrEF 2/2 ICM with LVEF 20-30%, atrial fibrillation, 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 Due to worsening functional limitations due to heart failure, patient was worked up for VAD while inpatient. Patient is now s/p implantation of HeartMate 3 LVAD on 9/18/2024 with Dr. Mulvihill. Returned to OR on POD 0 for mediastinal re-exploration, mediastinal clot evacuation, and chest washout.     Cardiovascular:   HFrEF 2/2 ICM - s/p HM3 LVAD implant 9/18/24  Cardiogenic shock, improved  Hx afib s/p DCCV 9/5/24 and 9/30/24  Hx VT/VF arrest with ICD in place (5/8/24)  Hx STEMI, CAD s/p PCI to LAD (2023)  HTN  HLD  High MAPs overnight with no flow alarms, in NSR  Preop echo showed LV EF 10-15%  Postop echo 9/27 showed LVAD cannula seen in the expected anatomic position in the LV apex, normal flow velocities, moderately reduced RV function, normal IVC   - ASA not indicated  - Atorvastatin 80 mg daily  - PTA amiodarone 200 mg daily  - Cards 2 following for heart failure optimization, appreciate assistance  - S/p MELBA/DCCV 9/30 to NSR   -10/2 Cards 2 increased Losartan to 50 mg daily, with plans to start entresto when ACEi washes out    Chest tubes: all removed    Pulmonary:  - Extubated POD 2; now saturating well on RA   - Pulm toilet, IS, OOB/activity and deep breathing  -Left pleural effusion resolved s/p left thora on 10/1 for 1 L removed  -Right pleural effusion  - 10/03 Right thora completed for 1 L removed      Neurology / MSK:  Acute post-operative pain  - Multimodal analgesia with acetaminophen, oxycodone PRN, Robaxin PRN, HS gabapentin  Anxiety  - PTA Lexapro resumed  S/p sternotomy   - PT/OT/CR consults   - Sternal precautions  Insomnia  - melatonin increased from 5 mg to 10  mg HS 9/28  Transient right eye visual deficit   - 9/29 patient reported transient discoloration in the top half of the visual field in his right eye, no additional neurologic deficits noted  - neurology consulted - recommend CT head and CTA head/neck to rule out stroke (patient can't get MRI due to LVAD); not a TPA candidate   - CT head and CTA head/neck without acute pathology/significant stenosis  - ophthalmology consulted - no evidence of CRAO. Most likely diagnosis is atypical amaurosis vs possible impending CRVO, agree with CT head/CTA head neck  -  Patient will need non-urgent follow up in ophthalmology clinic for repeat dilated exam, OCT macula each eye, and IVFA with transit right eye      / Renal / FE:  Hyponatremia  Hx prostate cancer s/p Leuprolide/radiation therapy   - Baseline creatinine 0.9-1  - Diuresis: Per cards, Bumex 1 mg ordered today   - lymphedema wraps ordered 9/28  - Replace electrolytes prn per protocol  - Strict I/O, daily standing weights per protocol  - Completed Leuprolide on 6/11/24 and radiation therapy on 6/27/24. Last PSA was <0.01 on 7/29/24   - Daily BMP    GI / Nutrition:   - Mechanical/dental soft diet, continue bowel regimen    Endocrine:  Stress-induced hyperglycemia, resolved  Hgb A1c 5.4%    - Initially managed on insulin drip postop, transitioned to sliding scale; discontinued POCT and sliding scale given maintenance of euglycemia without need for supplemental insulin    Infectious Disease:  Stress-induced leukocytosis, resolved  - WBC WNL, afebrile, no signs or symptoms of infection  - Completed perioperative antibiotics    Hematology:   Acute blood loss anemia  Thrombocytopenia, resolved  Warfarin AC for LVAD  Hgb stable; Plt WNL, no signs or symptoms of active bleeding    Anticoagulation:   - Coumadin for LVAD, INR goal 2-3. Most recent INR therapeutic at 2.77  - LIH discontinued    Prophylaxis:   - Stress ulcer prophylaxis: Pantoprazole 40 mg daily for 30 days  "post-operatively  - DVT prophylaxis: warfarin AC, SCD, OOB/activity    Disposition:   - Transferred to  on   - Therapies recommending discharge to ARU   - Medically Ready for Discharge: Has been medically ready since 10/1/24    Discussed with Dr. Mulvihill.     NADEGE Tyler, [facility]-, CCRN  Nurse Practitioner  Cardiothoracic Surgery  Pager: [phone]            Interval History:     Patient received IV Hydralazine overnight for MAP of 102 which improved after. States pain is well managed on current regimen, slept well. No SOB, breathing well on room air. Tolerating diet, is passing flatus and having BM, no n/v. Ambulating in the halls with assistance. No complaints of headaches or eye discomfort today. Denies chest pain, palpitations, dizziness, syncopal symptoms, chills, myalgias, sternal popping/clicking.           Physical Exam:   Blood pressure 98/84, pulse 82, temperature 98.4  F (36.9  C), temperature source Oral, resp. rate 16, height 1.676 m (5' 5.98\"), weight 68.9 kg (152 lb), SpO2 94%.  Vitals:    10/01/24 0600 10/02/24 0629 10/03/24 0400   Weight: 70.6 kg (155 lb 9.6 oz) 70.9 kg (156 lb 3.2 oz) 68.9 kg (152 lb)        Gen: resting in bed, NAD  Neuro: A&Ox4, intact, no focal deficits  CV: RRR, NSR on monitor, LVAD hum  Pulm: CTA room air  Abd: nondistended, non-tender  Ext: 2+ peripheral edema bilaterally, wraps in place  Incision: clean, dry, intact, no erythema or induration, sternum stable  Tubes/drain sites: dressing clean and dry  Lines: driveline dressing clean and dry     Heartmate 3 LEFT VS  Flow (Lpm): 3.3 Lpm  Pulse Index (PI): 5.1 PI  Speed (rpm): 5100 rpm  Power (jackson): 3.2 jackson  Current Hct settin     Clinically Significant Risk Factors         # Hyponatremia: Lowest Na = 128 mmol/L in last 2 days, will monitor as appropriate      # Hypoalbuminemia: Lowest albumin = 2.2 g/dL at 2024  3:18 AM, will monitor as appropriate        # End stage heart failure: Ventricular " assist device (VAD) present           # Moderate Malnutrition: based on nutrition assessment      # Financial/Environmental Concerns: none   # ICD device present            Data:    Imaging:  reviewed recent imaging    No results found for this or any previous visit (from the past 24 hour(s)).      Labs:  BMP  Recent Labs   Lab 10/03/24  0413 10/02/24  0500 10/01/24  0427 09/30/24  0414   * 128* 129* 131*   POTASSIUM 4.1 4.1 4.1 4.3   CHLORIDE 98 98 98 98   PRANAV 8.0* 7.9* 7.8* 7.9*   CO2 25 23 25 24   BUN 13.0 14.8 15.2 17.1   CR 0.68 0.67 0.67 0.63*   GLC 80 88 84 85     CBC  Recent Labs   Lab 10/03/24  0413 10/02/24  0500 10/01/24  0427 09/30/24  0414   WBC 7.4 8.5 7.5 9.3   RBC 2.73* 2.68* 2.50* 2.63*   HGB 8.3* 8.2* 7.5* 7.9*   HCT 26.3* 25.8* 24.1* 25.4*   MCV 96 96 96 97   MCH 30.4 30.6 30.0 30.0   MCHC 31.6 31.8 31.1* 31.1*   RDW 18.0* 17.2* 16.8* 16.7*    270 255 259     INR  Recent Labs   Lab 10/03/24  0413 10/02/24  0500 10/01/24  0427 09/30/24  0414   INR 2.77* 3.34* 3.14* 3.08*      Hepatic Panel  Recent Labs   Lab 09/27/24  0441   AST 34   ALT 28   ALKPHOS 76   BILITOTAL 0.3   ALBUMIN 2.4*     GLUCOSE:   Recent Labs   Lab 10/03/24  0413 10/02/24  0500 10/01/24  0427 09/30/24  0414 09/29/24  0458 09/28/24  0450   GLC 80 88 84 85 88 93

## 2024-10-03 NOTE — CARE PLAN
SLUMS  Scoring If High School Educated If Less than High School Educated   Normal 27-30 25-30   Mild Neurocognitive Disorder 21-26 20-24   Dementia 1-20 1-19   The SLUMS is a cognitive screening assessment used to identify the presence of cognitive deficits, and/or to identify a change in cognition over time.      Patient Score: 17/30    Score Interpretation: Pt scored 17/30, which indicates cognitive impairment in the areas of short term memory, recall, executive functioning and visual-spatial. Pt's previous SLUMs score on 09.21.24: 12/30      10/3/2024 by Jenny Recio, OT

## 2024-10-04 ENCOUNTER — APPOINTMENT (OUTPATIENT)
Dept: OCCUPATIONAL THERAPY | Facility: CLINIC | Age: 74
DRG: 001 | End: 2024-10-04
Attending: STUDENT IN AN ORGANIZED HEALTH CARE EDUCATION/TRAINING PROGRAM
Payer: MEDICARE

## 2024-10-04 ENCOUNTER — APPOINTMENT (OUTPATIENT)
Dept: PHYSICAL THERAPY | Facility: CLINIC | Age: 74
DRG: 001 | End: 2024-10-04
Attending: STUDENT IN AN ORGANIZED HEALTH CARE EDUCATION/TRAINING PROGRAM
Payer: MEDICARE

## 2024-10-04 LAB
ANION GAP SERPL CALCULATED.3IONS-SCNC: 8 MMOL/L (ref 7–15)
BUN SERPL-MCNC: 13.9 MG/DL (ref 8–23)
CALCIUM SERPL-MCNC: 8 MG/DL (ref 8.8–10.4)
CHLORIDE SERPL-SCNC: 100 MMOL/L (ref 98–107)
CREAT SERPL-MCNC: 0.69 MG/DL (ref 0.67–1.17)
EGFRCR SERPLBLD CKD-EPI 2021: >90 ML/MIN/1.73M2
ERYTHROCYTE [DISTWIDTH] IN BLOOD BY AUTOMATED COUNT: 17.8 % (ref 10–15)
GLUCOSE SERPL-MCNC: 81 MG/DL (ref 70–99)
HCO3 SERPL-SCNC: 24 MMOL/L (ref 22–29)
HCT VFR BLD AUTO: 27.3 % (ref 40–53)
HGB BLD-MCNC: 8.4 G/DL (ref 13.3–17.7)
INR PPP: 2.55 (ref 0.85–1.15)
MAGNESIUM SERPL-MCNC: 1.8 MG/DL (ref 1.7–2.3)
MCH RBC QN AUTO: 30.1 PG (ref 26.5–33)
MCHC RBC AUTO-ENTMCNC: 30.8 G/DL (ref 31.5–36.5)
MCV RBC AUTO: 98 FL (ref 78–100)
PHOSPHATE SERPL-MCNC: 2.6 MG/DL (ref 2.5–4.5)
PLATELET # BLD AUTO: 244 10E3/UL (ref 150–450)
POTASSIUM SERPL-SCNC: 4.4 MMOL/L (ref 3.4–5.3)
RBC # BLD AUTO: 2.79 10E6/UL (ref 4.4–5.9)
SODIUM SERPL-SCNC: 132 MMOL/L (ref 135–145)
WBC # BLD AUTO: 7.3 10E3/UL (ref 4–11)

## 2024-10-04 PROCEDURE — 250N000013 HC RX MED GY IP 250 OP 250 PS 637: Performed by: INTERNAL MEDICINE

## 2024-10-04 PROCEDURE — 250N000013 HC RX MED GY IP 250 OP 250 PS 637: Performed by: NURSE PRACTITIONER

## 2024-10-04 PROCEDURE — 80048 BASIC METABOLIC PNL TOTAL CA: CPT | Performed by: NURSE PRACTITIONER

## 2024-10-04 PROCEDURE — 85610 PROTHROMBIN TIME: CPT

## 2024-10-04 PROCEDURE — 85027 COMPLETE CBC AUTOMATED: CPT

## 2024-10-04 PROCEDURE — 250N000013 HC RX MED GY IP 250 OP 250 PS 637: Performed by: PHYSICIAN ASSISTANT

## 2024-10-04 PROCEDURE — 97530 THERAPEUTIC ACTIVITIES: CPT | Mod: GP

## 2024-10-04 PROCEDURE — 99231 SBSQ HOSP IP/OBS SF/LOW 25: CPT | Mod: 24 | Performed by: NURSE PRACTITIONER

## 2024-10-04 PROCEDURE — 97116 GAIT TRAINING THERAPY: CPT | Mod: GP

## 2024-10-04 PROCEDURE — 97535 SELF CARE MNGMENT TRAINING: CPT | Mod: GO | Performed by: OCCUPATIONAL THERAPIST

## 2024-10-04 PROCEDURE — 84100 ASSAY OF PHOSPHORUS: CPT

## 2024-10-04 PROCEDURE — 120N000003 HC R&B IMCU UMMC

## 2024-10-04 PROCEDURE — 83735 ASSAY OF MAGNESIUM: CPT

## 2024-10-04 PROCEDURE — 250N000011 HC RX IP 250 OP 636: Performed by: STUDENT IN AN ORGANIZED HEALTH CARE EDUCATION/TRAINING PROGRAM

## 2024-10-04 RX ORDER — MAGNESIUM OXIDE 400 MG/1
400 TABLET ORAL EVERY 4 HOURS
Status: COMPLETED | OUTPATIENT
Start: 2024-10-04 | End: 2024-10-04

## 2024-10-04 RX ORDER — BUMETANIDE 0.5 MG/1
0.5 TABLET ORAL DAILY
Status: DISCONTINUED | OUTPATIENT
Start: 2024-10-05 | End: 2024-10-05 | Stop reason: HOSPADM

## 2024-10-04 RX ADMIN — BUMETANIDE 1 MG: 1 TABLET ORAL at 08:10

## 2024-10-04 RX ADMIN — GABAPENTIN 100 MG: 100 CAPSULE ORAL at 21:48

## 2024-10-04 RX ADMIN — Medication 10 MG: at 21:48

## 2024-10-04 RX ADMIN — METHOCARBAMOL TABLETS 750 MG: 750 TABLET, COATED ORAL at 20:46

## 2024-10-04 RX ADMIN — WARFARIN SODIUM 1.5 MG: 3 TABLET ORAL at 17:37

## 2024-10-04 RX ADMIN — ACETAMINOPHEN 975 MG: 325 TABLET ORAL at 15:09

## 2024-10-04 RX ADMIN — POTASSIUM & SODIUM PHOSPHATES POWDER PACK 280-160-250 MG 1 PACKET: 280-160-250 PACK at 11:59

## 2024-10-04 RX ADMIN — ESCITALOPRAM OXALATE 10 MG: 10 TABLET ORAL at 08:10

## 2024-10-04 RX ADMIN — ACETAMINOPHEN 975 MG: 325 TABLET ORAL at 08:10

## 2024-10-04 RX ADMIN — POTASSIUM & SODIUM PHOSPHATES POWDER PACK 280-160-250 MG 1 PACKET: 280-160-250 PACK at 08:58

## 2024-10-04 RX ADMIN — HYDRALAZINE HYDROCHLORIDE 50 MG: 50 TABLET ORAL at 08:10

## 2024-10-04 RX ADMIN — ATORVASTATIN CALCIUM 80 MG: 80 TABLET, FILM COATED ORAL at 20:46

## 2024-10-04 RX ADMIN — THERA TABS 1 TABLET: TAB at 08:09

## 2024-10-04 RX ADMIN — LOSARTAN POTASSIUM 50 MG: 50 TABLET, FILM COATED ORAL at 08:10

## 2024-10-04 RX ADMIN — LOSARTAN POTASSIUM 50 MG: 50 TABLET, FILM COATED ORAL at 20:46

## 2024-10-04 RX ADMIN — MAGNESIUM OXIDE TAB 400 MG (241.3 MG ELEMENTAL MG) 400 MG: 400 (241.3 MG) TAB at 08:58

## 2024-10-04 RX ADMIN — EMPAGLIFLOZIN 10 MG: 10 TABLET, FILM COATED ORAL at 11:59

## 2024-10-04 RX ADMIN — METHOCARBAMOL TABLETS 750 MG: 750 TABLET, COATED ORAL at 08:10

## 2024-10-04 RX ADMIN — HEPARIN, PORCINE (PF) 10 UNIT/ML INTRAVENOUS SYRINGE 5 ML: at 05:38

## 2024-10-04 RX ADMIN — ACETAMINOPHEN 975 MG: 325 TABLET ORAL at 23:57

## 2024-10-04 RX ADMIN — AMIODARONE HYDROCHLORIDE 200 MG: 200 TABLET ORAL at 08:10

## 2024-10-04 RX ADMIN — PANTOPRAZOLE SODIUM 40 MG: 40 TABLET, DELAYED RELEASE ORAL at 08:09

## 2024-10-04 RX ADMIN — METHOCARBAMOL TABLETS 750 MG: 750 TABLET, COATED ORAL at 15:12

## 2024-10-04 RX ADMIN — MAGNESIUM OXIDE TAB 400 MG (241.3 MG ELEMENTAL MG) 400 MG: 400 (241.3 MG) TAB at 12:00

## 2024-10-04 ASSESSMENT — ACTIVITIES OF DAILY LIVING (ADL)
ADLS_ACUITY_SCORE: 31

## 2024-10-04 NOTE — PROGRESS NOTES
The patient's HeartMate LVAD was interrogated 10/4/2024  * Speed 5100 rpm   * Pulsatility index 5.7  * Power 3.1 Dahl   * Flow 3.5 L/minute   Fluid status: mild hypervolemia, improved   Alarms were reviewed, and notable for rare pi events, no alarms.   The driveline exit site was inspected, c/d/i.   All external components were inspected and showed no evidence of damage or malfunction, none replaced.

## 2024-10-04 NOTE — PROGRESS NOTES
[facility]   Cardiology II Service / Advanced Heart Failure  Daily Consult Note      Patient: Duane C Johnson  MRN: [id number]  Admission Date: 8/30/2024  Hospital Day # 35    Assessment and Plan: Duane C Johnson is a 74 year old male pmhx of anterior STEMI s/p PCI to the pLAD on 10/26/23 with a VT/VF arrest the next day (10/27) with patent stents and unchanged anatomy on repeat angiogram. Placed on amiodarone and discharged 11/03/23, ICD was not indicated as this was within 48h of his MI. His EF prior to discharge was 20-30% with a large area of akinesis in the LAD, placed on apixaban due to this (Apixaban dcd on 7/5/24). Has had multiple admissions for HF exacerbation last year.  Admitted on 8/30/24 for CHF exacerbation with BNP 16k. CPX and RHC showed significant functional limitations due to heart failue. Underwent HM3 LVAD on 9/18/24.     Recommendations:  - NO IV PRNS  - Ongoing vad teaching, session today at 1000 with wife.  - Patient is medically ready for discharge from Cards 2 standpoint.   - Start empagliflozin 10 mg daily for GDMT  - Decrease bumetanide to 0.5 mg daily while on SGLT2i    # Acute on chronic systolic heart failure secondary to ICM   # s/p HM3 LVAD as DT on 9/18/2024  # CAD s/p PCI  # History of STEMI  # s/p ICD 5/2024  Stage D. NYHA Class III.    Fluid status: Mild hypervolemia- evident on physical exam, decrease Bumex to 0.5 mg daily.   ACEi/ARB/ARNi: 50 mg losartan BID  Vasodilator: Hydralazine 50 mg TID  BB: Recent LVAD, will defer to outpatient  Aldosterone antagonist deferred while other medical therapy is prioritized  SGLT2i: start pta Empagliflozin 10 mg  SCD prophylaxis: ICD  MAP: goal 65-85, current MAPs 80-90  LDH trends: 271, stable  Anticoagulation: warfarin dosing per pharmacy, INR goal 2-3, today 2.55.   Antiplatelet: ASA not indicated in LVAD population, > 6 months s/p STEMI  Limited TTE 9/27 with LVIDd 4.0 cm, Closed AoV, no AI, moderately reduced RV, normal  IVC. Multiple low flow alarms 10/1 received 50 mg hydralazine PO and 500 ml LR.     # Low flow alarms- Resolved  # HTN  Multiple low flow alarms 10/1, none reported 10/2. Verified that doppler measure is MAP vs. SBP.   - Speed optimization echo performed 10/1, speed decreased to 5100  - Losartan 50 mg BID  - Hydralazine 50 mg TID    # History of VT/VF arrest 2023  # AFib s/p DCCV 9/2024 and again 9/30/2024  - Successfully cardioverted in early September for AF. Since then EKG suggested AF on 9/21. Tele is only available from as early as 9/22. The patient was started on hep gtt 9/21, but unclear if was in AF before this. While the patient was on hep gtt until INR was therapeutic, it is not possible to assess rhythm prior to 9/21. Systemic AC was off on 9/18-9/20. S/p MELBA and DCCV on 9/30 with conversion to sinus rhythm   - continue amiodarone 200 mg daily   - Continue AC as above    # Visual changes, resolved. Stoke code call 9/29 for visual changes - right eye with decreased upper half of vision and bluish haze. Resolved after 30 minutes. Seen by opthalmology and neuro. Negative head CT and CTA head and neck. He has had 3 episodes which last about 5 minutes before resolving completely.   - Ophthalmology and Neuro, signed off  - Already on A/C  - Continue to monitor for changes    # Hypervolemic hyponatremia  Na 132, ranging 128-133  - encourage PO intake  - daily BMP  - Bumex 0.5 mg daily, start empagliflozin 10 mg daily     # Anemia  - Hgb 8.4 10/4. No overt bleeds, slow downtrend since surgery. LDH downtrending.   - Iron studies suggestive of RADHA. Started on IV venofer x5 days    # Bilateral pleural effusions  - Chest tube removed 9/30.   - Left thoracentesis done 10/1, 1 L removed, R thoracentesis done 10/3, 1 L removed.       Time/Communication  I spent 45 minutes reviewing results, care team notes, meeting directly with the patient, coordinating care, and completing documentation on the day of service.  "    -Nellie Purvis NP Student    [facility] Cardiology  Advanced Heart Failure    Jody Moreira DNP, NP-C  Nurse Practitioner - Advanced Heart Failure/Cardiology II Service  [place] preferred or Pager [phone]    ================================================================    Subjective/24-Hr Events:   No acute events overnight. No lightheadedness or dizziness. No pre-syncope or syncope, no chest pain or SOB currently.     ROS:  4 point ROS including respiratory, CV, GI and  (other than that noted in the HPI) is negative.     Medications: Reviewed in EPIC.     Physical Exam:   BP 91/75   Pulse 78   Temp 97.9  F (36.6  C) (Oral)   Resp 16   Ht 1.676 m (5' 5.98\")   Wt 67.5 kg (148 lb 14.4 oz)   SpO2 95%   BMI 24.04 kg/m      GENERAL: Appears comfortable, in no distress, just had thoracentesis done.   HEENT: Eye symmetrical, no discharge or icterus bilaterally.  NECK: Supple, JVD improved.   CV: + LVAD hum.   RESPIRATORY: Respirations regular, even, slightly labored. Lungs CTA throughout.    GI: Soft and non distended with normoactive bowel sounds present in all quadrants. No tenderness, rebound, guarding.   EXTREMITIES: No peripheral edema. All extremities are warm and well perfused. Thready peripheral pulses present.   NEUROLOGIC: Alert and interacting appropriately. No focal deficits.   SKIN: No jaundice. No rashes or lesions. Sternum c/d/i    Labs:  CMP  Recent Labs   Lab 10/04/24  0447 10/03/24  0413 10/02/24  0500 10/01/24  0427   * 130* 128* 129*   POTASSIUM 4.4 4.1 4.1 4.1   CHLORIDE 100 98 98 98   CO2 24 25 23 25   ANIONGAP 8 7 7 6*   GLC 81 80 88 84   BUN 13.9 13.0 14.8 15.2   CR 0.69 0.68 0.67 0.67   GFRESTIMATED >90 >90 >90 >90   PRANAV 8.0* 8.0* 7.9* 7.8*   MAG 1.8 1.8 1.8 1.8   PHOS 2.6 2.9 2.8 3.0       CBC  Recent Labs   Lab 10/04/24  0447 10/03/24  0413 10/02/24  0500 10/01/24  0427   WBC 7.3 7.4 8.5 7.5   RBC 2.79* 2.73* 2.68* 2.50*   HGB 8.4* 8.3* 8.2* 7.5*   HCT 27.3* 26.3* 25.8* " 24.1*   MCV 98 96 96 96   MCH 30.1 30.4 30.6 30.0   MCHC 30.8* 31.6 31.8 31.1*   RDW 17.8* 18.0* 17.2* 16.8*    273 270 255       INR  Recent Labs   Lab 10/04/24  0447 10/03/24  0413 10/02/24  0500 10/01/24  0427   INR 2.55* 2.77* 3.34* 3.14*

## 2024-10-04 NOTE — PROGRESS NOTES
Cardiovascular Surgery Progress Note  10/04/2024         Assessment and Plan:     Duane C Johnson is a 74 year old male with a PMH of  HFrEF 2/2 ICM with LVEF 20-30%, atrial fibrillation, 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 Due to worsening functional limitations due to heart failure, patient was worked up for VAD while inpatient. Patient is now s/p implantation of HeartMate 3 LVAD on 9/18/2024 with Dr. Mulvihill. Returned to OR on POD 0 for mediastinal re-exploration, mediastinal clot evacuation, and chest washout.     Cardiovascular:   HFrEF 2/2 ICM - s/p HM3 LVAD implant 9/18/24  Cardiogenic shock, improved  Hx afib s/p DCCV 9/5/24 and 9/30/24  Hx VT/VF arrest with ICD in place (5/8/24)  Hx STEMI, CAD s/p PCI to LAD (2023)  HTN  HLD  HD stable overnight with no flow alarms, in NSR, some low flow alarms this AM  Preop echo showed LV EF 10-15%  Postop echo 9/27 showed LVAD cannula seen in the expected anatomic position in the LV apex, normal flow velocities, moderately reduced RV function, normal IVC   - ASA not indicated  - Atorvastatin 80 mg daily  - PTA amiodarone 200 mg daily  - Cards 2 following for heart failure optimization, appreciate assistance  - S/p MELBA/DCCV 9/30 to NSR   -10/2 Cards 2 increased Losartan to 50 mg daily, with plans to start entresto when ACEi washes out    Chest tubes: all removed    Pulmonary:  Bilateral pleural effusions  - Extubated POD 2; now saturating well on RA   - Pulm toilet, IS, OOB/activity and deep breathing  - Left pleural effusion resolved s/p left thora on 10/1 for 1 L removed  - 10/03 Right thora completed for 1 L removed    - CXR improved 10/3    Neurology / MSK:  Acute post-operative pain  - Multimodal analgesia with acetaminophen, oxycodone PRN, Robaxin PRN, HS gabapentin  Anxiety  - PTA Lexapro resumed  S/p sternotomy   - PT/OT/CR consults   - Sternal  precautions  Insomnia  - melatonin increased from 5 mg to 10 mg HS 9/28  Transient right eye visual deficit   - 9/29 patient reported transient discoloration in the top half of the visual field in his right eye, no additional neurologic deficits noted  - neurology consulted - recommend CT head and CTA head/neck to rule out stroke (patient can't get MRI due to LVAD); not a TPA candidate   - CT head and CTA head/neck without acute pathology/significant stenosis  - ophthalmology consulted - no evidence of CRAO. Most likely diagnosis is atypical amaurosis vs possible impending CRVO, agree with CT head/CTA head neck  -  Patient will need non-urgent follow up in ophthalmology clinic for repeat dilated exam, OCT macula each eye, and IVFA with transit right eye      / Renal / FE:  Hyponatremia  Hx prostate cancer s/p Leuprolide/radiation therapy   - Baseline creatinine 0.9-1  - Diuresis: Per cards, Bumex 1 mg daily  - lymphedema wraps ordered 9/28  - Replace electrolytes prn per protocol  - Strict I/O, daily standing weights per protocol  - Completed Leuprolide on 6/11/24 and radiation therapy on 6/27/24. Last PSA was <0.01 on 7/29/24   - Daily BMP    GI / Nutrition:   - Mechanical/dental soft diet, continue bowel regimen    Endocrine:  Stress-induced hyperglycemia, resolved  Hgb A1c 5.4%    - Initially managed on insulin drip postop, transitioned to sliding scale; discontinued POCT and sliding scale given maintenance of euglycemia without need for supplemental insulin    Infectious Disease:  Stress-induced leukocytosis, resolved  - WBC WNL, afebrile, no signs or symptoms of infection  - Completed perioperative antibiotics    Hematology:   Acute blood loss anemia  Thrombocytopenia, resolved  Warfarin AC for LVAD  Hgb stable; Plt WNL, no signs or symptoms of active bleeding    Anticoagulation:   - Coumadin for LVAD, INR goal 2-3. Most recent INR therapeutic at 2.55  - LIH discontinued    Prophylaxis:   - Stress ulcer  "prophylaxis: Pantoprazole 40 mg daily for 30 days post-operatively  - DVT prophylaxis: warfarin AC, SCD, OOB/activity    Disposition:   - Transferred to  on   - Therapies recommending discharge to ARU   - Medically Ready for Discharge: Has been medically ready since 10/1/24    Discussed with Dr. Mulvihill.     NADEGE Tyler, ACN-AG, CCRN  Nurse Practitioner  Cardiothoracic Surgery  Pager: [phone]            Interval History:     No acute events overnight. States pain is well managed on current regimen, slept well. Breathing is stable on room air, working with IS. Tolerating diet, is passing flatus, +BM, no n/v. Ambulating in the halls with assistance. Denies chest pain, palpitations, dizziness, syncopal symptoms, chills, myalgias, sternal popping/clicking.         Physical Exam:   Blood pressure 91/75, pulse 78, temperature 97.9  F (36.6  C), temperature source Oral, resp. rate 16, height 1.676 m (5' 5.98\"), weight 67.5 kg (148 lb 14.4 oz), SpO2 95%.  Vitals:    10/02/24 0629 10/03/24 0400 10/04/24 0436   Weight: 70.9 kg (156 lb 3.2 oz) 68.9 kg (152 lb) 67.5 kg (148 lb 14.4 oz)        Gen: resting in bed, NAD  Neuro: A&Ox4, intact, no focal deficits  CV: RRR, NSR on monitor, LVAD hum  Pulm: CTA room air  Abd: nondistended, non-tender  Ext: 1+ peripheral edema bilaterally, wraps in place  Incision: clean, dry, intact, no erythema or induration, sternum stable  Tubes/drain sites: dressing clean and dry  Lines: driveline dressing clean and dry     Heartmate 3 LEFT VS  Flow (Lpm): 3.3 Lpm  Pulse Index (PI): 6.5 PI  Speed (rpm): 5150 rpm  Power (jackson): 3.2 jackson  Current Hct settin     Clinically Significant Risk Factors         # Hyponatremia: Lowest Na = 130 mmol/L in last 2 days, will monitor as appropriate      # Hypoalbuminemia: Lowest albumin = 2.2 g/dL at 2024  3:18 AM, will monitor as appropriate        # End stage heart failure: Ventricular assist device (VAD) present           # " Moderate Malnutrition: based on nutrition assessment      # Financial/Environmental Concerns: none   # ICD device present            Data:    Imaging:  reviewed recent imaging    Recent Results (from the past 24 hour(s))   XR Chest Port 1 View    Narrative    EXAM: XR CHEST PORT 1 VIEW 10/3/2024 10:06 AM    INDICATION: sp R thoracentesis    COMPARISON: Same day chest radiograph 6:18    TECHNIQUE: Single portable semiupright AP view of the chest.     FINDINGS:   Stable position of support devices, including right upper extremity  PICC, left chest implantable cardiac defibrillator, LVAD, and  sternotomy wires. Increased retrocardiac opacities. Improved aeration  of the right lung base. Stable cardiomegaly. No evidence of  pneumothorax or focal consolidation.      Impression    IMPRESSION:   1. Increased retrocardiac opacities which could represent left basilar  atelectasis versus small pleural effusion.  2. Improved aeration of the right lung base status post thoracentesis  with resolution of right pleural effusion. No pneumothorax.    I have personally reviewed the examination and initial interpretation  and I agree with the findings.    SAKINA ALLRED MD         SYSTEM ID:  [id number]         Labs:  BMP  Recent Labs   Lab 10/04/24  0447 10/03/24  0413 10/02/24  0500 10/01/24  0427   * 130* 128* 129*   POTASSIUM 4.4 4.1 4.1 4.1   CHLORIDE 100 98 98 98   PRANAV 8.0* 8.0* 7.9* 7.8*   CO2 24 25 23 25   BUN 13.9 13.0 14.8 15.2   CR 0.69 0.68 0.67 0.67   GLC 81 80 88 84     CBC  Recent Labs   Lab 10/04/24  0447 10/03/24  0413 10/02/24  0500 10/01/24  0427   WBC 7.3 7.4 8.5 7.5   RBC 2.79* 2.73* 2.68* 2.50*   HGB 8.4* 8.3* 8.2* 7.5*   HCT 27.3* 26.3* 25.8* 24.1*   MCV 98 96 96 96   MCH 30.1 30.4 30.6 30.0   MCHC 30.8* 31.6 31.8 31.1*   RDW 17.8* 18.0* 17.2* 16.8*    273 270 255     INR  Recent Labs   Lab 10/04/24  0447 10/03/24  0413 10/02/24  0500 10/01/24  0427   INR 2.55* 2.77* 3.34* 3.14*      Hepatic  Panel  No lab results found in last 7 days.    GLUCOSE:   Recent Labs   Lab 10/04/24  0447 10/03/24  0413 10/02/24  0500 10/01/24  0427 09/30/24  0414 09/29/24  0458   GLC 81 80 88 84 85 88

## 2024-10-04 NOTE — PROGRESS NOTES
Care Management Discharge Note     Discharge Date: Tentative 10/5/24     [facility]     Discharge Services: Inpatient PT/OT     Discharge DME: None     Discharge Transportation: [facility] stretcher transport - Ride Window set for 12:08-12:53     Private pay costs discussed: Not applicable     Does the patient's insurance plan have a 3 day qualifying hospital stay waiver?  Yes      Which insurance plan 3 day waiver is available? [employer] REACH     Will the waiver be used for post-acute placement? No     PAS Confirmation Code:  N/A - going to ARU  Patient/family educated on Medicare website which has current facility and service quality ratings: no     Education Provided on the Discharge Plan: Yes  Persons Notified of Discharge Plans: Patient and Wife (Melissa)  Patient/Family in Agreement with the Plan: yes     Handoff Referral Completed: No     Plan for 24 hour care for immediate post VAD period: Patient's wife Melissa will be primary caregiver. They will return to their home in [place], [place].     Education and resources provided by SW at discharge: role of VAD  in out patient setting and provided contact info for , and availability of support groups.     Plan: Transition to [facility] on 10/5. Stretcher transport ride window set for 12:08-12:53. Weekend SW to ensure Pt remains med ready and confirm with [facility] Liaison (Roxana Main).    Additional Information:  Duane received his LVAD on 9/18/24 and is medically ready to transition to [facility] on Saturday, 10/5/24. Weekend SW to check in with primary provider and [facility] Liaison to ensure Pt continues to remain appropriate to transfer. Ride window set for 12:08-12:53 via stretcher transport.     Writer updated Pt and wife of tentative transition to [facility] tomorrow. Pt agreeable to discharge when medically ready and verbalized understanding plan of care. Wife in agreement. If discharge plans change, please reach out to wife.      Following [facility], patient and wife will begin their 24/7-30 day care-giver support plan at their house in [place], [place] with wife being primary caregiver.     Weekend SW to support if Pt medically ready over the weekend. If Pt not medically ready over the weekend, primary SW to continue to follow next week and support discharge to [facility].    Jessi Almendarez, MSW, LGSW, APSW  Heart Transplant/[facility]   [employer]  Ph. [phone]

## 2024-10-04 NOTE — PLAN OF CARE
Shift: [id number]     Admission: s/p HM3 (9/18), returned for mediastinal re-exploration, clot excavation, chest wash out.     Neuro: alert and oriented x4  Cardiac: SR, denies chest pain  Respiratory: Lung sounds clear bilaterally   GI/: Continent of b/b  Diet/appetite: mechanical soft diet   Activity: SBA   Pain: denies pain .  Skin: No significant changes  LDA's: R DL PICC SL, LPIV      Plan: Plan to discharge to TCU if space is available.             Goal Outcome Evaluation:    Plan of Care Reviewed With: patient    Overall Patient Progress: improvingOverall Patient Progress: improving    Outcome Evaluation: Denies pain, MAPs in 80s, VSS       "RAPID RESPONSE NURSE CHART REVIEW        Chart Reviewed: 08/18/2023, 10:55 PM    MRN: [id number]  Bed: 906/906 A    Dx: Embolic stroke involving left middle cerebral artery    Tera Griffiths has a past medical history of Acute on chronic combined systolic and diastolic heart failure, Atrial fibrillation, CAD (coronary artery disease), Dyslipidemia, Embolic stroke involving left middle cerebral artery, Encounter for blood transfusion, HTN (hypertension), ICD (implantable cardioverter-defibrillator) in place, Paroxysmal A-fib, and Stage 3 chronic kidney disease.    Last VS: BP (!) 132/95 (BP Location: Right arm, Patient Position: Sitting)   Pulse 109   Temp 98.1 °F (36.7 °C) (Axillary)   Resp 18   Ht 6' 1" (1.854 m)   Wt 83.5 kg (184 lb 1.4 oz)   SpO2 96%   BMI 24.29 kg/m²     24H Vital Sign Range:  Temp:  [97.8 °F (36.6 °C)-99.6 °F (37.6 °C)]   Pulse:  []   Resp:  [17-41]   BP: ()/(49-95)   SpO2:  [95 %-100 %]     Level of Consciousness (AVPU): alert    Recent Labs     08/17/23  0452 08/17/23  1351 08/18/23  0302   WBC 6.52 10.93 11.29  11.29   HGB 17.4 15.3 15.2  15.2   HCT 56.4* 49.5 48.8  48.8    363 322  322       Recent Labs     08/16/23  0215 08/17/23  0452 08/18/23  0346   * 150* 151*   K 4.3 4.2 4.5   * 119* 119*   CO2 22* 18* 21*   BUN 26* 28* 34*   CREATININE 1.4 1.3 2.0*    100 117*   PHOS 3.7 4.1 3.7   MG 2.3 2.1 2.2        No results for input(s): "PH", "PCO2", "PO2", "HCO3", "POCSATURATED", "BE" in the last 72 hours.     OXYGEN:  Flow (L/min):  (1)  Oxygen Concentration (%): 28       MEWS score: 1    Charge RN, Elsi  contacted for intermittent tachycardia, vomiting. No additional concerns verbalized at this time. Instructed to call [phone] for further concerns or assistance.    Guera Newman RN       "

## 2024-10-04 NOTE — PLAN OF CARE
NEURO: A&O x4, but forgetful. Calls appropriately.   RESPIRATORY: LS clear and equal bilaterally. On RA, endorsed DIOP after working with therapy.   CARDIAC: SR w/ 1st degree AV block. LVAD numbers WNL except low flow (2.7-2.9 this AM, resolved). MAPs 62-90. Denies cardiac chest pain.   GI/: Voiding spontaneously. +BS, LBM today  DIET: Mechanical soft diet  PAIN/NAUSEA: C/o muscular pain in R shoulder and back, managed with Tylenol, Robaxin, and heat packs. Denies nausea.   SKIN/INCISION/DRAINS: No new skin issues noted. LVAD dressing changed by pt's spouse and LVAD coordinator today.   IV ACCESS: R DL PICC SL, L PIV SL   ACTIVITY: Ax1 with gait belt and RW  LAB: Mag and phos replaced  PLAN: Continue with POC, discharge to TCU tomorrow around noon. Notify Cards 2 with any changes.

## 2024-10-05 ENCOUNTER — HOSPITAL ENCOUNTER (INPATIENT)
Facility: CLINIC | Age: 74
LOS: 12 days | Discharge: NURSING, REHAB OR LONG-TERM CARE | DRG: 949 | End: 2024-10-17
Attending: PHYSICAL MEDICINE & REHABILITATION | Admitting: PHYSICAL MEDICINE & REHABILITATION
Payer: MEDICARE

## 2024-10-05 VITALS
HEIGHT: 66 IN | RESPIRATION RATE: 16 BRPM | WEIGHT: 145.9 LBS | BODY MASS INDEX: 23.45 KG/M2 | DIASTOLIC BLOOD PRESSURE: 75 MMHG | TEMPERATURE: 97.2 F | HEART RATE: 77 BPM | OXYGEN SATURATION: 97 % | SYSTOLIC BLOOD PRESSURE: 91 MMHG

## 2024-10-05 DIAGNOSIS — Z95.811 LVAD (LEFT VENTRICULAR ASSIST DEVICE) PRESENT (H): ICD-10-CM

## 2024-10-05 DIAGNOSIS — Z79.01 ANTICOAGULATED ON WARFARIN: Primary | ICD-10-CM

## 2024-10-05 DIAGNOSIS — Z95.811 HISTORY OF LEFT VENTRICULAR ASSIST DEVICE (LVAD) (H): ICD-10-CM

## 2024-10-05 LAB
ANION GAP SERPL CALCULATED.3IONS-SCNC: 6 MMOL/L (ref 7–15)
BUN SERPL-MCNC: 16 MG/DL (ref 8–23)
CALCIUM SERPL-MCNC: 8 MG/DL (ref 8.8–10.4)
CHLORIDE SERPL-SCNC: 100 MMOL/L (ref 98–107)
CREAT SERPL-MCNC: 0.72 MG/DL (ref 0.67–1.17)
EGFRCR SERPLBLD CKD-EPI 2021: >90 ML/MIN/1.73M2
ERYTHROCYTE [DISTWIDTH] IN BLOOD BY AUTOMATED COUNT: 18.2 % (ref 10–15)
GLUCOSE SERPL-MCNC: 77 MG/DL (ref 70–99)
HCO3 SERPL-SCNC: 25 MMOL/L (ref 22–29)
HCT VFR BLD AUTO: 25.7 % (ref 40–53)
HGB BLD-MCNC: 8.1 G/DL (ref 13.3–17.7)
INR PPP: 2.12 (ref 0.85–1.15)
INR PPP: 2.34 (ref 0.85–1.15)
MAGNESIUM SERPL-MCNC: 1.7 MG/DL (ref 1.7–2.3)
MCH RBC QN AUTO: 30.6 PG (ref 26.5–33)
MCHC RBC AUTO-ENTMCNC: 31.5 G/DL (ref 31.5–36.5)
MCV RBC AUTO: 97 FL (ref 78–100)
PHOSPHATE SERPL-MCNC: 2.9 MG/DL (ref 2.5–4.5)
PLATELET # BLD AUTO: 227 10E3/UL (ref 150–450)
POTASSIUM SERPL-SCNC: 4 MMOL/L (ref 3.4–5.3)
RBC # BLD AUTO: 2.65 10E6/UL (ref 4.4–5.9)
SODIUM SERPL-SCNC: 131 MMOL/L (ref 135–145)
WBC # BLD AUTO: 6.6 10E3/UL (ref 4–11)

## 2024-10-05 PROCEDURE — 99222 1ST HOSP IP/OBS MODERATE 55: CPT | Mod: AI

## 2024-10-05 PROCEDURE — 250N000013 HC RX MED GY IP 250 OP 250 PS 637: Performed by: INTERNAL MEDICINE

## 2024-10-05 PROCEDURE — 250N000013 HC RX MED GY IP 250 OP 250 PS 637

## 2024-10-05 PROCEDURE — 99231 SBSQ HOSP IP/OBS SF/LOW 25: CPT | Mod: 24 | Performed by: NURSE PRACTITIONER

## 2024-10-05 PROCEDURE — 128N000003 HC R&B REHAB

## 2024-10-05 PROCEDURE — 85610 PROTHROMBIN TIME: CPT

## 2024-10-05 PROCEDURE — 36415 COLL VENOUS BLD VENIPUNCTURE: CPT

## 2024-10-05 PROCEDURE — 84100 ASSAY OF PHOSPHORUS: CPT

## 2024-10-05 PROCEDURE — 85027 COMPLETE CBC AUTOMATED: CPT

## 2024-10-05 PROCEDURE — 250N000013 HC RX MED GY IP 250 OP 250 PS 637: Performed by: NURSE PRACTITIONER

## 2024-10-05 PROCEDURE — 93750 INTERROGATION VAD IN PERSON: CPT | Performed by: NURSE PRACTITIONER

## 2024-10-05 PROCEDURE — 99232 SBSQ HOSP IP/OBS MODERATE 35: CPT | Mod: 25 | Performed by: NURSE PRACTITIONER

## 2024-10-05 PROCEDURE — 5A2204Z RESTORATION OF CARDIAC RHYTHM, SINGLE: ICD-10-PCS | Performed by: INTERNAL MEDICINE

## 2024-10-05 PROCEDURE — 250N000013 HC RX MED GY IP 250 OP 250 PS 637: Performed by: PHYSICAL MEDICINE & REHABILITATION

## 2024-10-05 PROCEDURE — 83735 ASSAY OF MAGNESIUM: CPT

## 2024-10-05 PROCEDURE — 80048 BASIC METABOLIC PNL TOTAL CA: CPT | Performed by: NURSE PRACTITIONER

## 2024-10-05 PROCEDURE — 250N000011 HC RX IP 250 OP 636: Performed by: STUDENT IN AN ORGANIZED HEALTH CARE EDUCATION/TRAINING PROGRAM

## 2024-10-05 RX ORDER — MAGNESIUM OXIDE 400 MG/1
400 TABLET ORAL EVERY 4 HOURS
Status: DISCONTINUED | OUTPATIENT
Start: 2024-10-05 | End: 2024-10-05 | Stop reason: HOSPADM

## 2024-10-05 RX ORDER — NALOXONE HYDROCHLORIDE 0.4 MG/ML
0.4 INJECTION, SOLUTION INTRAMUSCULAR; INTRAVENOUS; SUBCUTANEOUS
Status: DISCONTINUED | OUTPATIENT
Start: 2024-10-05 | End: 2024-10-17 | Stop reason: HOSPADM

## 2024-10-05 RX ORDER — PANTOPRAZOLE SODIUM 40 MG/1
40 TABLET, DELAYED RELEASE ORAL
Status: DISCONTINUED | OUTPATIENT
Start: 2024-10-06 | End: 2024-10-17 | Stop reason: HOSPADM

## 2024-10-05 RX ORDER — GABAPENTIN 100 MG/1
100 CAPSULE ORAL AT BEDTIME
Status: ON HOLD | DISCHARGE
Start: 2024-10-05

## 2024-10-05 RX ORDER — BISACODYL 10 MG
10 SUPPOSITORY, RECTAL RECTAL DAILY PRN
Status: ON HOLD | DISCHARGE
Start: 2024-10-05

## 2024-10-05 RX ORDER — MAGNESIUM OXIDE 400 MG/1
400 TABLET ORAL DAILY
Status: DISCONTINUED | OUTPATIENT
Start: 2024-10-05 | End: 2024-10-17 | Stop reason: HOSPADM

## 2024-10-05 RX ORDER — ATORVASTATIN CALCIUM 80 MG/1
80 TABLET, FILM COATED ORAL EVERY EVENING
Status: DISCONTINUED | OUTPATIENT
Start: 2024-10-05 | End: 2024-10-17 | Stop reason: HOSPADM

## 2024-10-05 RX ORDER — NALOXONE HYDROCHLORIDE 0.4 MG/ML
0.2 INJECTION, SOLUTION INTRAMUSCULAR; INTRAVENOUS; SUBCUTANEOUS PRN
Status: ON HOLD | DISCHARGE
Start: 2024-10-05

## 2024-10-05 RX ORDER — ONDANSETRON 4 MG/1
4 TABLET, ORALLY DISINTEGRATING ORAL EVERY 6 HOURS PRN
Status: ON HOLD | DISCHARGE
Start: 2024-10-05

## 2024-10-05 RX ORDER — OXYCODONE HYDROCHLORIDE 5 MG/1
5 TABLET ORAL EVERY 4 HOURS PRN
Status: ON HOLD | DISCHARGE
Start: 2024-10-05

## 2024-10-05 RX ORDER — AMIODARONE HYDROCHLORIDE 200 MG/1
200 TABLET ORAL DAILY
Status: DISCONTINUED | OUTPATIENT
Start: 2024-10-06 | End: 2024-10-17 | Stop reason: HOSPADM

## 2024-10-05 RX ORDER — OXYCODONE HYDROCHLORIDE 5 MG/1
5 TABLET ORAL EVERY 4 HOURS PRN
Status: DISCONTINUED | OUTPATIENT
Start: 2024-10-05 | End: 2024-10-17 | Stop reason: HOSPADM

## 2024-10-05 RX ORDER — NALOXONE HYDROCHLORIDE 0.4 MG/ML
0.2 INJECTION, SOLUTION INTRAMUSCULAR; INTRAVENOUS; SUBCUTANEOUS
Status: DISCONTINUED | OUTPATIENT
Start: 2024-10-05 | End: 2024-10-17 | Stop reason: HOSPADM

## 2024-10-05 RX ORDER — AMOXICILLIN 250 MG
2 CAPSULE ORAL 2 TIMES DAILY PRN
Status: ON HOLD | DISCHARGE
Start: 2024-10-05

## 2024-10-05 RX ORDER — ACETAMINOPHEN 325 MG/1
975 TABLET ORAL EVERY 8 HOURS
Status: DISCONTINUED | OUTPATIENT
Start: 2024-10-05 | End: 2024-10-11

## 2024-10-05 RX ORDER — WARFARIN SODIUM 1 MG/1
2 TABLET ORAL
Status: COMPLETED | OUTPATIENT
Start: 2024-10-05 | End: 2024-10-05

## 2024-10-05 RX ORDER — MULTIPLE VITAMINS W/ MINERALS TAB 9MG-400MCG
1 TAB ORAL 2 TIMES DAILY
Status: DISCONTINUED | OUTPATIENT
Start: 2024-10-06 | End: 2024-10-16

## 2024-10-05 RX ORDER — BUMETANIDE 0.5 MG/1
0.5 TABLET ORAL DAILY
Status: ON HOLD | DISCHARGE
Start: 2024-10-05

## 2024-10-05 RX ORDER — ACETAMINOPHEN 325 MG/1
650 TABLET ORAL EVERY 4 HOURS PRN
Status: ON HOLD | DISCHARGE
Start: 2024-10-05

## 2024-10-05 RX ORDER — ESCITALOPRAM OXALATE 10 MG/1
10 TABLET ORAL DAILY
Status: DISCONTINUED | OUTPATIENT
Start: 2024-10-06 | End: 2024-10-17 | Stop reason: HOSPADM

## 2024-10-05 RX ORDER — PANTOPRAZOLE SODIUM 40 MG/1
40 TABLET, DELAYED RELEASE ORAL
Status: ON HOLD | DISCHARGE
Start: 2024-10-06 | End: 2024-10-18

## 2024-10-05 RX ORDER — HYDRALAZINE HYDROCHLORIDE 50 MG/1
50 TABLET, FILM COATED ORAL 3 TIMES DAILY
Status: ON HOLD | DISCHARGE
Start: 2024-10-05

## 2024-10-05 RX ORDER — POLYETHYLENE GLYCOL 3350 17 G/17G
17 POWDER, FOR SOLUTION ORAL 2 TIMES DAILY PRN
Status: DISCONTINUED | OUTPATIENT
Start: 2024-10-05 | End: 2024-10-17 | Stop reason: HOSPADM

## 2024-10-05 RX ORDER — POLYETHYLENE GLYCOL 3350 17 G/17G
17 POWDER, FOR SOLUTION ORAL 2 TIMES DAILY PRN
Status: ON HOLD | DISCHARGE
Start: 2024-10-05

## 2024-10-05 RX ORDER — PHENOL 1.4 %
10 AEROSOL, SPRAY (ML) MUCOUS MEMBRANE EVERY EVENING
Status: ON HOLD | DISCHARGE
Start: 2024-10-05

## 2024-10-05 RX ORDER — HYDRALAZINE HYDROCHLORIDE 50 MG/1
50 TABLET, FILM COATED ORAL 3 TIMES DAILY
Status: DISCONTINUED | OUTPATIENT
Start: 2024-10-05 | End: 2024-10-07

## 2024-10-05 RX ORDER — GABAPENTIN 100 MG/1
100 CAPSULE ORAL AT BEDTIME
Status: DISCONTINUED | OUTPATIENT
Start: 2024-10-05 | End: 2024-10-17 | Stop reason: HOSPADM

## 2024-10-05 RX ORDER — LOSARTAN POTASSIUM 50 MG/1
50 TABLET ORAL 2 TIMES DAILY
Status: ON HOLD | DISCHARGE
Start: 2024-10-05

## 2024-10-05 RX ORDER — ESCITALOPRAM OXALATE 10 MG/1
10 TABLET ORAL DAILY
Status: ON HOLD | DISCHARGE
Start: 2024-10-06

## 2024-10-05 RX ORDER — METHOCARBAMOL 750 MG/1
750 TABLET, FILM COATED ORAL 3 TIMES DAILY
Status: DISCONTINUED | OUTPATIENT
Start: 2024-10-05 | End: 2024-10-17 | Stop reason: HOSPADM

## 2024-10-05 RX ORDER — LOSARTAN POTASSIUM 50 MG/1
50 TABLET ORAL 2 TIMES DAILY
Status: DISCONTINUED | OUTPATIENT
Start: 2024-10-05 | End: 2024-10-17 | Stop reason: HOSPADM

## 2024-10-05 RX ORDER — NALOXONE HYDROCHLORIDE 0.4 MG/ML
0.4 INJECTION, SOLUTION INTRAMUSCULAR; INTRAVENOUS; SUBCUTANEOUS PRN
Status: ON HOLD | DISCHARGE
Start: 2024-10-05

## 2024-10-05 RX ORDER — LIDOCAINE 40 MG/G
CREAM TOPICAL
Status: DISCONTINUED | OUTPATIENT
Start: 2024-10-05 | End: 2024-10-17 | Stop reason: HOSPADM

## 2024-10-05 RX ORDER — AMOXICILLIN 250 MG
2 CAPSULE ORAL 2 TIMES DAILY PRN
Status: DISCONTINUED | OUTPATIENT
Start: 2024-10-05 | End: 2024-10-17 | Stop reason: HOSPADM

## 2024-10-05 RX ORDER — METHOCARBAMOL 750 MG/1
750 TABLET, FILM COATED ORAL 3 TIMES DAILY PRN
Status: ON HOLD | DISCHARGE
Start: 2024-10-05

## 2024-10-05 RX ORDER — ACETAMINOPHEN 325 MG/1
650 TABLET ORAL EVERY 4 HOURS PRN
Status: DISCONTINUED | OUTPATIENT
Start: 2024-10-05 | End: 2024-10-11

## 2024-10-05 RX ORDER — BUMETANIDE 0.5 MG/1
0.5 TABLET ORAL DAILY
Status: DISCONTINUED | OUTPATIENT
Start: 2024-10-06 | End: 2024-10-10

## 2024-10-05 RX ORDER — ACETAMINOPHEN 325 MG/1
975 TABLET ORAL EVERY 8 HOURS
Status: ON HOLD | DISCHARGE
Start: 2024-10-05

## 2024-10-05 RX ORDER — BISACODYL 10 MG
10 SUPPOSITORY, RECTAL RECTAL DAILY PRN
Status: DISCONTINUED | OUTPATIENT
Start: 2024-10-05 | End: 2024-10-17 | Stop reason: HOSPADM

## 2024-10-05 RX ORDER — ONDANSETRON 4 MG/1
4 TABLET, ORALLY DISINTEGRATING ORAL EVERY 6 HOURS PRN
Status: DISCONTINUED | OUTPATIENT
Start: 2024-10-05 | End: 2024-10-17 | Stop reason: HOSPADM

## 2024-10-05 RX ADMIN — ACETAMINOPHEN 975 MG: 325 TABLET ORAL at 08:23

## 2024-10-05 RX ADMIN — WARFARIN SODIUM 2 MG: 1 TABLET ORAL at 18:14

## 2024-10-05 RX ADMIN — ATORVASTATIN CALCIUM 80 MG: 80 TABLET, FILM COATED ORAL at 20:38

## 2024-10-05 RX ADMIN — GABAPENTIN 100 MG: 100 CAPSULE ORAL at 20:39

## 2024-10-05 RX ADMIN — Medication 10 MG: at 20:39

## 2024-10-05 RX ADMIN — METHOCARBAMOL TABLETS 750 MG: 750 TABLET, COATED ORAL at 08:23

## 2024-10-05 RX ADMIN — HYDRALAZINE HYDROCHLORIDE 50 MG: 50 TABLET ORAL at 00:22

## 2024-10-05 RX ADMIN — HYDRALAZINE HYDROCHLORIDE 50 MG: 50 TABLET ORAL at 14:39

## 2024-10-05 RX ADMIN — HEPARIN, PORCINE (PF) 10 UNIT/ML INTRAVENOUS SYRINGE 5 ML: at 05:26

## 2024-10-05 RX ADMIN — METHOCARBAMOL 750 MG: 750 TABLET ORAL at 14:38

## 2024-10-05 RX ADMIN — ACETAMINOPHEN 975 MG: 325 TABLET, FILM COATED ORAL at 21:39

## 2024-10-05 RX ADMIN — MAGNESIUM OXIDE TAB 400 MG (241.3 MG ELEMENTAL MG) 400 MG: 400 (241.3 MG) TAB at 14:40

## 2024-10-05 RX ADMIN — ACETAMINOPHEN 975 MG: 325 TABLET, FILM COATED ORAL at 14:38

## 2024-10-05 RX ADMIN — THERA TABS 1 TABLET: TAB at 08:23

## 2024-10-05 RX ADMIN — METHOCARBAMOL 750 MG: 750 TABLET ORAL at 20:39

## 2024-10-05 RX ADMIN — EMPAGLIFLOZIN 10 MG: 10 TABLET, FILM COATED ORAL at 08:23

## 2024-10-05 RX ADMIN — AMIODARONE HYDROCHLORIDE 200 MG: 200 TABLET ORAL at 08:23

## 2024-10-05 RX ADMIN — HYDRALAZINE HYDROCHLORIDE 50 MG: 50 TABLET ORAL at 20:39

## 2024-10-05 RX ADMIN — PANTOPRAZOLE SODIUM 40 MG: 40 TABLET, DELAYED RELEASE ORAL at 08:23

## 2024-10-05 RX ADMIN — LOSARTAN POTASSIUM 50 MG: 50 TABLET, FILM COATED ORAL at 20:38

## 2024-10-05 RX ADMIN — HYDRALAZINE HYDROCHLORIDE 50 MG: 50 TABLET ORAL at 08:23

## 2024-10-05 RX ADMIN — BUMETANIDE 0.5 MG: 0.5 TABLET ORAL at 10:58

## 2024-10-05 RX ADMIN — LOSARTAN POTASSIUM 50 MG: 50 TABLET, FILM COATED ORAL at 08:23

## 2024-10-05 RX ADMIN — MAGNESIUM OXIDE TAB 400 MG (241.3 MG ELEMENTAL MG) 400 MG: 400 (241.3 MG) TAB at 08:23

## 2024-10-05 RX ADMIN — ESCITALOPRAM OXALATE 10 MG: 10 TABLET ORAL at 08:23

## 2024-10-05 ASSESSMENT — ACTIVITIES OF DAILY LIVING (ADL)
ADLS_ACUITY_SCORE: 31
DIFFICULTY_EATING/SWALLOWING: NO
ADLS_ACUITY_SCORE: 33
ADLS_ACUITY_SCORE: 31
ADLS_ACUITY_SCORE: 31
ADLS_ACUITY_SCORE: 38
ADLS_ACUITY_SCORE: 33
ADLS_ACUITY_SCORE: 34
ADLS_ACUITY_SCORE: 31
ADLS_ACUITY_SCORE: 33
TOILETING_ISSUES: NO
ADLS_ACUITY_SCORE: 33
ADLS_ACUITY_SCORE: 31
FALL_HISTORY_WITHIN_LAST_SIX_MONTHS: NO
ADLS_ACUITY_SCORE: 33
ADLS_ACUITY_SCORE: 31

## 2024-10-05 ASSESSMENT — PATIENT HEALTH QUESTIONNAIRE - PHQ9: SUM OF ALL RESPONSES TO PHQ9 QUESTIONS 1 & 2: 0

## 2024-10-05 NOTE — LETTER
"Faxed to:  [facility]  Fax Number:  [phone]                                                                                                                                                                        [email or web address]   Email Order Form to [email or web address]  Phone: [phone] Fax: [phone]  Patient Name: Duane C Johnson Date:  10/8/24   Facility Name: [facility]  Fax Number: [phone]    VAD Coordinator/ :   Franchesca Haddad RN Phone: [phone]    Email Address:         [email or web address]   Patient's Primary Insurance Upon Receiving the VAD unit:          Dressing Change Frequency: Daily X Every Other Day   Other (Specify)              Duration of Need:  DT (Lifetime) X BTT (until transplant)   Other (Specify)      NEW ORDERS ONLY: PLEASE SUBMIT PATIENT DEMOGRAPHICS, POST OP NOTES & NOTES FROM MOST RECENT CLINICAL VISIT WITH ORDER FORM.                  Fax: [phone]  Or Email Order Form to   [email or web address]     ck Product Size/ Description Quantity    Kit Options     X Medline Driveline Management Tray [id number]    Daily Up to 30    Medline Driveline Management Tray [id number]  Sensitive Up to 30    Medline Driveline Management Tray [id number]  Weekly Up to 10          Whitwell Options     X [facility] Whitwell /  Michael Whitwell [id number] / [id number] Up to 30   X CathGrip / CathGrip Low Profile Securement Whitwell Sm/1 strap  Med/2 Strap    Large/2 Strap Up to 30    [employer] [place] Cath Secure Dual Tab Whitwell [id number] Up to 30    Abdominal Binder Sm              Med              Lg   Up to 1          Adhesive Options     X Medipore Tape 1\"     2             cloth Up to 5 rolls    3M Micropore S Surgical Tape (Kind Removal Silicone Tape) 1\"     2\" Up to 5 rolls    Safe N Simple Blue silicone tape 1\"     2\" Up to 5 rolls    Blenderm Surgical Tape / Transpore Tape 1\"     " "2\" Up to 5 rolls    Simpurity Silicone Transparent Film 4\" x 5\" Up to 30    Smith and Nephew IV 3000 Transparent Film 4\" x 5.5\" Up to 30          Alternate Cleansers      Betadine Swabsticks (Povidone Iodine) 3/pack Up to 30          Individual Supplies      Aquaguard/Shower Shield 7 x 7       9  x 9      10  x 12  Up to 35   X FreeDerm Adhesive Remover Wipes Wipes Up to 60    FreeDerm Adhesive Remover Spray 1 oz  3oz  Spray             Up to 4    Uni-Solve Adhesive Remover Wipes Box Up to 2    BioPlus Skin Barrier Wipes Box  Up to 2    3M Cavilon No-sting Barrier Swabstick Swabstick Up to 60   X Sterile Vinyl PF gloves Sizes     8                Pair Up to 30    Non Sterile Gloves 100/box        Size: S    M    L   XL Up to 3    Biopatch 1       1.5\" Up to 30    Silverlon 1.5cm Square disc [id number] Up to 30    Alcohol Prep Pads Box Up to 1 box     My signature below certifies the medical necessity of the above approved products and I certify the patient has been trained in the use of all products. The patient is aware that [facility] will contact him/her regarding the shipment of ordered dressing supplies.     Provider Name: Ham Cruz NPI#: [id number]   Provider Signature:  Digitally signed by   Ham Cruz 10/8/2024 1:26PM Provider Number: [phone]   [facility] will confirm receipt for all initial orders by sending a fax to the provider listed above.      "

## 2024-10-05 NOTE — PHARMACY-ANTICOAGULATION SERVICE
Clinical Pharmacy - Warfarin Dosing Consult     Pharmacy has been consulted to manage this patient s warfarin therapy.  Indication: LVAD/RVAD  Therapy Goal: INR 2-3  Warfarin Prior to Admission: No  Significant drug interactions: aspirin, escitalopram, amiodarone      INR   Date Value Ref Range Status   10/05/2024 2.34 (H) 0.85 - 1.15 Final   10/04/2024 2.55 (H) 0.85 - 1.15 Final       Recommend warfarin 2 mg today.  Pharmacy will monitor Duane C Johnson daily and order warfarin doses to achieve specified goal.      Please contact pharmacy as soon as possible if the warfarin needs to be held for a procedure or if the warfarin goals change.

## 2024-10-05 NOTE — PLAN OF CARE
Pt A&O x4, but forgetful. VSS, MAP 92 and 80. On RA, denies SOB. LVAD dressing CDI. Pt up SBA. Voiding spontaneously, LBM yesterday. Discharging to ARU, report given to BLADE Dubon. EMS picked up pt for transportation and pt discharged in stable condition at 12:45 pm.

## 2024-10-05 NOTE — PLAN OF CARE
Goal Outcome Evaluation:      Plan of Care Reviewed With: patient    Overall Patient Progress: no change    Patient admitted to unit at 1:30pm. Patient is alert and oriented x4. On regular diet,mechanical soft, thin liquids.Patient takes medications whole without any difficulty. Calls appropriately. Continent of bowel and bladder, urinal at bedside.Last bm 10/4/24. No PVCs done, patient educated to call immediately after voiding. Patient can shift weight independently in bed.Denies any pain. Patient on LVAD, MAP of 84 and on room air. Bed alarm on for safety.Call light within reach. Will continue with POC.

## 2024-10-05 NOTE — PLAN OF CARE
Occupational Therapy Discharge Summary    Reason for therapy discharge:    Discharged to acute rehabilitation facility.    Progress towards therapy goal(s). See goals on Care Plan in [facility] electronic health record for goal details.  Goals partially met.  Barriers to achieving goals:   discharge from facility.    Therapy recommendation(s):    Continued therapy is recommended.  Rationale/Recommendations:  to cont to progress functional strength, endurance, safety, and IND w/ all ADL and mobility within new precautions.

## 2024-10-05 NOTE — PHARMACY-ADMISSION MEDICATION HISTORY
Admission medication history completed at [facility]. Please see Pharmacy Intern Admission Medication History note from 9/6/2024.

## 2024-10-05 NOTE — PROGRESS NOTES
[id number]  Neuro: A&Ox4. Forgetful at times.   Cardiac: SR. LVAD numbers WDL. MAP low at 2000, per provider hold hydralazine, MAP high at 0000 hydralazine giver per provider.   Respiratory: Sating >92% on RA. SOB with activity.   GI/: Adequate urine output. No BM overnight.   Diet/appetite: Tolerating mechanical soft diet.   Activity:  Assist of 1, up to chair and in halls.  Pain: At acceptable level on current regimen.   Skin: No new deficits noted.  LDA's: Double lumen PICC to right arm, PIV to left arm.   Plan: Continue with POC. Notify primary team with changes.
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
## 2024-10-05 NOTE — PLAN OF CARE
Physical Therapy Discharge Summary    Reason for therapy discharge:    Discharged to acute rehabilitation facility.    Progress towards therapy goal(s). See goals on Care Plan in [facility] electronic health record for goal details.  Goals partially met.  Barriers to achieving goals:   discharge from facility.    Therapy recommendation(s):    Continued therapy is recommended.  Rationale/Recommendations:  Recommend pt continue improving remaining impairments at ARU.

## 2024-10-05 NOTE — PLAN OF CARE
Lymphedema Therapy Discharge Summary    Reason for therapy discharge:    Discharged to acute rehabilitation facility.    Progress towards therapy goal(s). See goals on Care Plan in [facility] electronic health record for goal details.  Goals partially met.  Barriers to achieving goals:   discharge from facility.    Therapy recommendation(s):    Pt will benefit from ongoing use of elastic stockinette for edema maintenance.

## 2024-10-05 NOTE — PROCEDURES
The patient's HeartMate LVAD was interrogated 10/5/2024  * Speed 5100 rpm   * Pulsatility index 4.3   * Power 3.2 Dahl   * Flow 3.4 L/minute   Fluid status: euvolemic   Alarms were reviewed, and notable for rare PI events, no power spikes, speed drops, or other findings suspicious of pump malfunction noted.   The driveline exit site was inspected, CDI.   All external components were inspected and showed no evidence of damage or malfunction, none replaced.   No changes to VAD settings made

## 2024-10-05 NOTE — PROGRESS NOTES
Brief Social Work Progress Note      STACIA got confirmation from ARU admissions that the ARU provider reviewed the pt and confirmed admit for today.    STACIA updated bedside RN (who'll update the charge RN) and the the primary provider (STACIA updated them via Vocera and requested orders and summary be completed by 12pm).    STACIA completed PAS form (required for all stretcher rides) and faxed form to billing at 9:54am.    Please see primary LVAD SW discharge note 10/4 for details.    ___________________    Kain Verdin, MONTRELL, LGSW  10/5/2024  Weekend 6C      Social Work and Care Management Department       SEARCHABLE in [facility] - search SOCIAL WORK       [facility] (0800 - 1630) Saturday and Sunday     Units: 4A Vocera, 4C Vocera, & 4E Vocera        Units: 5A [id number] Vocera, 5A 4046-0650 Vocera , BMT SW 1 BMT SW 2, BMT SW 3 & BMT SW 4  5C Off Service 5401 - 5416  5C Off Service [id number]     Units: 6A Vocera & 6B Vocera      Units: 6C Vocera     Units: 7A Vocera & 7B Vocera      Units: 7C Med Surg 7401 thru 7418 and 7C Med Surg 7502 thru 7521      Unit: [facility] ED Vocera & [facility] Obs Vocera     [facility] ([phone]) Saturday and Sunday      Units: 5 Ortho Vocera, 5 Med Surg Vocera & WB ED Vocera     Units: 6 Med Surg Vocera, 8 Med Surg Vocera, & 10 ICU Vocera      After hours Vocera [facility] and After Hours Vocera [facility]     Please NOTE changes to times below:    **Saturday & Sunday (1630 - 2030)    **Mon-Fri ([id number])     **[facility] Recognized Holidays  ([id number])    Units: ALL   - see above VOCERA links to units

## 2024-10-05 NOTE — PROGRESS NOTES
[facility]   Cardiology II Service / Advanced Heart Failure  Daily Consult Note      Patient: Duane C Johnson  MRN: [id number]  Admission Date: 8/30/2024  Hospital Day # 36    Assessment and Plan: Duane C Johnson is a 74 year old male pmhx of anterior STEMI s/p PCI to the pLAD on 10/26/23 with a VT/VF arrest the next day (10/27) with patent stents and unchanged anatomy on repeat angiogram. Placed on amiodarone and discharged 11/03/23, ICD was not indicated as this was within 48h of his MI. His EF prior to discharge was 20-30% with a large area of akinesis in the LAD, placed on apixaban due to this (Apixaban dcd on 7/5/24). Has had multiple admissions for HF exacerbation last year.  Admitted on 8/30/24 for CHF exacerbation with BNP 16k. CPX and RHC showed significant functional limitations due to heart failue. Underwent HM3 LVAD on 9/18/24.     Recommendations:  - Ongoing vad teaching, will continue at ARU  - Patient is medically ready for discharge from Cards 2 standpoint    # Acute on chronic systolic heart failure secondary to ICM   # s/p HM3 LVAD as DT on 9/18/2024  # CAD s/p PCI  # History of STEMI  # s/p ICD 5/2024  Stage D. NYHA Class III.    Fluid status: Mild hypervolemia- evident on physical exam, decrease Bumex to 0.5 mg daily.   ACEi/ARB/ARNi: 50 mg losartan BID  Vasodilator: Hydralazine 50 mg TID  BB: Recent LVAD, will defer to outpatient  Aldosterone antagonist deferred while other medical therapy is prioritized  SGLT2i:  Empagliflozin 10 mg  SCD prophylaxis: ICD  MAP: goal 65-85, current MAPs 80-90  LDH trends: 271, stable  Anticoagulation: warfarin dosing per pharmacy, INR goal 2-3, today 2.34.   Antiplatelet: ASA not indicated in LVAD population, > 6 months s/p STEMI  Limited TTE 9/27 with LVIDd 4.0 cm, Closed AoV, no AI, moderately reduced RV, normal IVC. Multiple low flow alarms 10/1 received 50 mg hydralazine PO and 500 ml LR.     # Low flow alarms- Resolved  # HTN  Multiple low  flow alarms 10/1, none reported 10/2. Verified that doppler measure is MAP vs. SBP.   - Speed optimization echo performed 10/1, speed decreased to 5100  - Losartan 50 mg BID  - Hydralazine 50 mg TID    # History of VT/VF arrest 2023  # AFib s/p DCCV 9/2024 and again 9/30/2024  - Successfully cardioverted in early September for AF. Since then EKG suggested AF on 9/21. Tele is only available from as early as 9/22. The patient was started on hep gtt 9/21, but unclear if was in AF before this. While the patient was on hep gtt until INR was therapeutic, it is not possible to assess rhythm prior to 9/21. Systemic AC was off on 9/18-9/20. S/p MELBA and DCCV on 9/30 with conversion to sinus rhythm   - continue amiodarone 200 mg daily   - Continue AC as above    # Visual changes, resolved. Stoke code call 9/29 for visual changes - right eye with decreased upper half of vision and bluish haze. Resolved after 30 minutes. Seen by opthalmology and neuro. Negative head CT and CTA head and neck. He has had 3 episodes which last about 5 minutes before resolving completely.   - Ophthalmology and Neuro, signed off  - Already on A/C  - Continue to monitor for changes    # Hypervolemic hyponatremia  Na 132, ranging 128-133  - encourage PO intake  - daily BMP  - Bumex 0.5 mg daily, empagliflozin 10 mg daily     # Anemia  - Hgb 8.4 10/4. No overt bleeds, slow downtrend since surgery. LDH downtrending.   - Iron studies suggestive of RADHA. Started on IV venofer x5 days    # Bilateral pleural effusions  - Chest tube removed 9/30.   - Left thoracentesis done 10/1, 1 L removed, R thoracentesis done 10/3, 1 L removed.       Time/Communication  I spent 40 minutes reviewing results, care team notes, meeting directly with the patient, coordinating care, and completing documentation on the day of service.     Patient discussed with Dr. David Arias, DNP, NP-C  Nurse Practitioner - Advanced Heart Failure/Cardiology II Service  [facility]  "preferred or Pager [phone]    ================================================================    Subjective/24-Hr Events:   No acute events overnight. No lightheadedness or dizziness. No pre-syncope or syncope, no chest pain or SOB currently. Feeling well. Eager for discharge to ARU.      ROS:  4 point ROS including respiratory, CV, GI and  (other than that noted in the HPI) is negative.     Medications: Reviewed in EPIC.     Physical Exam:   BP 91/75   Pulse 77   Temp 97.2  F (36.2  C) (Oral)   Resp 16   Ht 1.676 m (5' 5.98\")   Wt 66.2 kg (145 lb 14.4 oz)   SpO2 97%   BMI 23.56 kg/m      GENERAL: Appears comfortable, in no distress, just had thoracentesis done.   HEENT: Eye symmetrical, no discharge or icterus bilaterally.  NECK: Supple, JVD improved.   CV: + LVAD hum.   RESPIRATORY: Respirations regular, even, slightly labored. Lungs CTA throughout.    GI: Soft and non distended with normoactive bowel sounds present in all quadrants. No tenderness, rebound, guarding.   EXTREMITIES: No peripheral edema. All extremities are warm and well perfused. Thready peripheral pulses present.   NEUROLOGIC: Alert and interacting appropriately. No focal deficits.   SKIN: No jaundice. No rashes or lesions. Sternum c/d/i    Labs:  CMP  Recent Labs   Lab 10/05/24  0527 10/04/24  0447 10/03/24  0413 10/02/24  0500   * 132* 130* 128*   POTASSIUM 4.0 4.4 4.1 4.1   CHLORIDE 100 100 98 98   CO2 25 24 25 23   ANIONGAP 6* 8 7 7   GLC 77 81 80 88   BUN 16.0 13.9 13.0 14.8   CR 0.72 0.69 0.68 0.67   GFRESTIMATED >90 >90 >90 >90   PRANAV 8.0* 8.0* 8.0* 7.9*   MAG 1.7 1.8 1.8 1.8   PHOS 2.9 2.6 2.9 2.8       CBC  Recent Labs   Lab 10/05/24  0527 10/04/24  0447 10/03/24  0413 10/02/24  0500   WBC 6.6 7.3 7.4 8.5   RBC 2.65* 2.79* 2.73* 2.68*   HGB 8.1* 8.4* 8.3* 8.2*   HCT 25.7* 27.3* 26.3* 25.8*   MCV 97 98 96 96   MCH 30.6 30.1 30.4 30.6   MCHC 31.5 30.8* 31.6 31.8   RDW 18.2* 17.8* 18.0* 17.2*    244 273 270 "       INR  Recent Labs   Lab 10/05/24  0527 10/04/24  0447 10/03/24  0413 10/02/24  0500   INR 2.34* 2.55* 2.77* 3.34*

## 2024-10-06 ENCOUNTER — APPOINTMENT (OUTPATIENT)
Dept: PHYSICAL THERAPY | Facility: CLINIC | Age: 74
DRG: 949 | End: 2024-10-06
Attending: PHYSICAL MEDICINE & REHABILITATION
Payer: MEDICARE

## 2024-10-06 ENCOUNTER — APPOINTMENT (OUTPATIENT)
Dept: SPEECH THERAPY | Facility: CLINIC | Age: 74
DRG: 949 | End: 2024-10-06
Attending: PHYSICAL MEDICINE & REHABILITATION
Payer: MEDICARE

## 2024-10-06 ENCOUNTER — APPOINTMENT (OUTPATIENT)
Dept: OCCUPATIONAL THERAPY | Facility: CLINIC | Age: 74
DRG: 949 | End: 2024-10-06
Attending: PHYSICAL MEDICINE & REHABILITATION
Payer: MEDICARE

## 2024-10-06 LAB
ANION GAP SERPL CALCULATED.3IONS-SCNC: 5 MMOL/L (ref 7–15)
BASOPHILS # BLD AUTO: 0.1 10E3/UL (ref 0–0.2)
BASOPHILS NFR BLD AUTO: 1 %
BUN SERPL-MCNC: 15.9 MG/DL (ref 8–23)
CALCIUM SERPL-MCNC: 8.5 MG/DL (ref 8.8–10.4)
CHLORIDE SERPL-SCNC: 98 MMOL/L (ref 98–107)
CREAT SERPL-MCNC: 0.78 MG/DL (ref 0.67–1.17)
EGFRCR SERPLBLD CKD-EPI 2021: >90 ML/MIN/1.73M2
EOSINOPHIL # BLD AUTO: 0.1 10E3/UL (ref 0–0.7)
EOSINOPHIL NFR BLD AUTO: 1 %
ERYTHROCYTE [DISTWIDTH] IN BLOOD BY AUTOMATED COUNT: 17.8 % (ref 10–15)
GLUCOSE BLDC GLUCOMTR-MCNC: 118 MG/DL (ref 70–99)
GLUCOSE SERPL-MCNC: 112 MG/DL (ref 70–99)
HCO3 SERPL-SCNC: 29 MMOL/L (ref 22–29)
HCT VFR BLD AUTO: 29 % (ref 40–53)
HGB BLD-MCNC: 9.4 G/DL (ref 13.3–17.7)
IMM GRANULOCYTES # BLD: 0.1 10E3/UL
IMM GRANULOCYTES NFR BLD: 1 %
INR PPP: 2.05 (ref 0.85–1.15)
LYMPHOCYTES # BLD AUTO: 0.4 10E3/UL (ref 0.8–5.3)
LYMPHOCYTES NFR BLD AUTO: 6 %
MAGNESIUM SERPL-MCNC: 1.9 MG/DL (ref 1.7–2.3)
MCH RBC QN AUTO: 31.5 PG (ref 26.5–33)
MCHC RBC AUTO-ENTMCNC: 32.4 G/DL (ref 31.5–36.5)
MCV RBC AUTO: 97 FL (ref 78–100)
MONOCYTES # BLD AUTO: 0.5 10E3/UL (ref 0–1.3)
MONOCYTES NFR BLD AUTO: 7 %
NEUTROPHILS # BLD AUTO: 6.2 10E3/UL (ref 1.6–8.3)
NEUTROPHILS NFR BLD AUTO: 85 %
NRBC # BLD AUTO: 0 10E3/UL
NRBC BLD AUTO-RTO: 0 /100
PHOSPHATE SERPL-MCNC: 2.9 MG/DL (ref 2.5–4.5)
PLATELET # BLD AUTO: 240 10E3/UL (ref 150–450)
POTASSIUM SERPL-SCNC: 4.6 MMOL/L (ref 3.4–5.3)
RBC # BLD AUTO: 2.98 10E6/UL (ref 4.4–5.9)
SODIUM SERPL-SCNC: 132 MMOL/L (ref 135–145)
WBC # BLD AUTO: 7.3 10E3/UL (ref 4–11)

## 2024-10-06 PROCEDURE — 97530 THERAPEUTIC ACTIVITIES: CPT | Mod: GP

## 2024-10-06 PROCEDURE — 250N000013 HC RX MED GY IP 250 OP 250 PS 637

## 2024-10-06 PROCEDURE — 80048 BASIC METABOLIC PNL TOTAL CA: CPT

## 2024-10-06 PROCEDURE — 36415 COLL VENOUS BLD VENIPUNCTURE: CPT

## 2024-10-06 PROCEDURE — 84100 ASSAY OF PHOSPHORUS: CPT

## 2024-10-06 PROCEDURE — 250N000013 HC RX MED GY IP 250 OP 250 PS 637: Performed by: PHYSICAL MEDICINE & REHABILITATION

## 2024-10-06 PROCEDURE — 99222 1ST HOSP IP/OBS MODERATE 55: CPT | Performed by: INTERNAL MEDICINE

## 2024-10-06 PROCEDURE — 97535 SELF CARE MNGMENT TRAINING: CPT | Mod: GO

## 2024-10-06 PROCEDURE — 97165 OT EVAL LOW COMPLEX 30 MIN: CPT | Mod: GO

## 2024-10-06 PROCEDURE — 83735 ASSAY OF MAGNESIUM: CPT

## 2024-10-06 PROCEDURE — 85025 COMPLETE CBC W/AUTO DIFF WBC: CPT

## 2024-10-06 PROCEDURE — 128N000003 HC R&B REHAB

## 2024-10-06 PROCEDURE — 97162 PT EVAL MOD COMPLEX 30 MIN: CPT | Mod: GP

## 2024-10-06 PROCEDURE — 96125 COGNITIVE TEST BY HC PRO: CPT | Mod: GN | Performed by: SPEECH-LANGUAGE PATHOLOGIST

## 2024-10-06 PROCEDURE — 85610 PROTHROMBIN TIME: CPT

## 2024-10-06 RX ORDER — WARFARIN SODIUM 2.5 MG/1
2.5 TABLET ORAL
Status: COMPLETED | OUTPATIENT
Start: 2024-10-06 | End: 2024-10-06

## 2024-10-06 RX ADMIN — BUMETANIDE 0.5 MG: 0.5 TABLET ORAL at 09:59

## 2024-10-06 RX ADMIN — GABAPENTIN 100 MG: 100 CAPSULE ORAL at 20:25

## 2024-10-06 RX ADMIN — PANTOPRAZOLE SODIUM 40 MG: 40 TABLET, DELAYED RELEASE ORAL at 06:46

## 2024-10-06 RX ADMIN — ATORVASTATIN CALCIUM 80 MG: 80 TABLET, FILM COATED ORAL at 20:25

## 2024-10-06 RX ADMIN — MAGNESIUM OXIDE TAB 400 MG (241.3 MG ELEMENTAL MG) 400 MG: 400 (241.3 MG) TAB at 08:58

## 2024-10-06 RX ADMIN — METHOCARBAMOL 750 MG: 750 TABLET ORAL at 08:57

## 2024-10-06 RX ADMIN — Medication 1 TABLET: at 08:57

## 2024-10-06 RX ADMIN — ACETAMINOPHEN 975 MG: 325 TABLET, FILM COATED ORAL at 21:54

## 2024-10-06 RX ADMIN — HYDRALAZINE HYDROCHLORIDE 50 MG: 50 TABLET ORAL at 08:58

## 2024-10-06 RX ADMIN — METHOCARBAMOL 750 MG: 750 TABLET ORAL at 14:51

## 2024-10-06 RX ADMIN — Medication 1 TABLET: at 20:25

## 2024-10-06 RX ADMIN — Medication 10 MG: at 20:25

## 2024-10-06 RX ADMIN — LOSARTAN POTASSIUM 50 MG: 50 TABLET, FILM COATED ORAL at 08:58

## 2024-10-06 RX ADMIN — LOSARTAN POTASSIUM 50 MG: 50 TABLET, FILM COATED ORAL at 20:25

## 2024-10-06 RX ADMIN — EMPAGLIFLOZIN 10 MG: 10 TABLET, FILM COATED ORAL at 08:58

## 2024-10-06 RX ADMIN — ACETAMINOPHEN 975 MG: 325 TABLET, FILM COATED ORAL at 14:50

## 2024-10-06 RX ADMIN — HYDRALAZINE HYDROCHLORIDE 50 MG: 50 TABLET ORAL at 20:26

## 2024-10-06 RX ADMIN — HYDRALAZINE HYDROCHLORIDE 50 MG: 50 TABLET ORAL at 14:50

## 2024-10-06 RX ADMIN — AMIODARONE HYDROCHLORIDE 200 MG: 200 TABLET ORAL at 08:57

## 2024-10-06 RX ADMIN — METHOCARBAMOL 750 MG: 750 TABLET ORAL at 20:26

## 2024-10-06 RX ADMIN — ACETAMINOPHEN 975 MG: 325 TABLET, FILM COATED ORAL at 06:46

## 2024-10-06 RX ADMIN — WARFARIN SODIUM 2.5 MG: 2.5 TABLET ORAL at 17:29

## 2024-10-06 RX ADMIN — ESCITALOPRAM OXALATE 10 MG: 10 TABLET ORAL at 08:57

## 2024-10-06 ASSESSMENT — ACTIVITIES OF DAILY LIVING (ADL)
ADLS_ACUITY_SCORE: 34
BADLS,_PREVIOUS_FUNCTIONAL_LEVEL: INDEPENDENT
ADLS_ACUITY_SCORE: 34
IADLS,_PREVIOUS_FUNCTIONAL_LEVEL: INDEPENDENT
ADLS_ACUITY_SCORE: 34
ADLS_ACUITY_SCORE: 37
ADLS_ACUITY_SCORE: 34
ADLS_ACUITY_SCORE: 34
IADLS,_PREVIOUS_FUNCTIONAL_LEVEL: PARTIAL ASSISTANCE
BADLS,_PREVIOUS_FUNCTIONAL_LEVEL: INDEPENDENT
ADLS_ACUITY_SCORE: 34
ADLS_ACUITY_SCORE: 34
ADLS_ACUITY_SCORE: 38
ADLS_ACUITY_SCORE: 34
ADLS_ACUITY_SCORE: 34
ADLS_ACUITY_SCORE: 37
ADLS_ACUITY_SCORE: 34
ADLS_ACUITY_SCORE: 38

## 2024-10-06 NOTE — PLAN OF CARE
"Goal Outcome Evaluation:      Plan of Care Reviewed With: patient    Overall Patient Progress: no change  VS: MAP-85  Temp 98.5  F (36.9  C) (Oral)   Resp 16   Ht 1.676 m (5' 6\")   Wt 66.8 kg (147 lb 4.3 oz)   SpO2 97%   BMI 23.77 kg/m       O2: SpO2 97% and stable on RA. Denies chest pain and SOB.    Output: Voids spontaneously without difficulty to bathroom / using beside urinal.   Last BM: 10/5, denies abdominal discomfort. BS active / passing flatus.    Activity: Up with SBA with GB , no device.   Skin: WDL except, LVAD drive line surgical site ,sternal incision and chest tube sites.   Pain: Denies pain.   Orientation; Alert and Oriented x 4. Denies numbness and tingling.   Dressing: New dressing applied to chest tube sites and drive line surgical site.   Diet: On mechanical /dental soft diet , thin liquids and takes pills whole.     LDA: No PIV access.   Equipment: IV pole, personal belongings,    Plan:  Continue with plan of care. Call light within reach, pt able to make needs known.    Additional Info: Pt says he ordered dinner ,writer tried to order for him but refused. No food was delivered ,was given chicken noodle soup and drank Ensure .       "

## 2024-10-06 NOTE — PLAN OF CARE
"Discharge Planner Post-Acute Rehab OT:      Discharge Plan: home with wife, HH OT.      Precautions: falls, sternal, cardiac, LVAD     Current Status:  ADLs:  Mobility: SBA FWW  Grooming: SBA at sink   Dressing: UB - set up seated. LB - Min A to pull over hips. Feet - TBD.   Bathing: TBD  Toileting: Transfer CGA FWW  IADLs: TBD  Vision/Cognition: Pt scored 17/30 on SLUMs in 10/3/24, would benefit from further cog assessments/functional cognition activities.      Assessment: Per chart \"Duane C Johnson is a 74 year old right hand dominant male with a PMH of HFrEF 2/2 ICM with LVEF 20-30%, atrial fibrillation, 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 Patient is now s/p implantation of HeartMate 3 LVAD on 9/18/2024. His post-operative course was complicated by Afib, pain, anxiety, insomnia, and transient right eye visual deficit. Admitted to rehab on 10/05/2024 in setting of impaired strength, impaired balance and impaired activity tolerance.\" Pt is below baseline for ADLs/IADLs post LVAD, would benefit from skilled OT to return to PLOF. ELOS 7 days      Other Barriers to Discharge (DME, Family Training, etc):   "

## 2024-10-06 NOTE — CONSULTS
[facility]  Consult Note - Hospitalist Service  Date of Admission:  10/5/2024  Consult Requested by: Dr Cruz   Reason for Consult: medical co management     Assessment & Plan    Duane C Johnson is a 74 year old male pmhx of anterior STEMI s/p PCI to the pLAD on 10/26/23 with a VT/VF arrest the next day (10/27) Placed on amiodarone and discharged 11/03/23, ICD was not indicated as this was within 48h of his MI. His EF prior to discharge was 20-30% with a large area of akinesis in the LAD, placed on apixaban due to this (Apixaban dcd on 7/5/24).   Had multiple admissions for HF exacerbation     Admitted on 8/30/24 for CHF exacerbation with BNP 16k. CPX and RHC showed significant functional limitations due to heart failue. Underwent HM3 LVAD on 9/18/24.   Transferred to ARU on 10/5 for rehab        # Acute on chronic systolic heart failure secondary to ICM   # s/p HM3 LVAD as DT on 9/18/2024  # CAD s/p PCI  # History of STEMI  # s/p ICD 5/2024  Stage D. NYHA Class III.     Fluid status: Bumex to 0.5 mg daily.   ACEi/ARB/ARNi: 50 mg losartan BID  Vasodilator: Hydralazine 50 mg TID  BB: Recent LVAD, will defer to outpatient  Aldosterone antagonist deferred while other medical therapy is prioritized  SGLT2i:  Empagliflozin 10 mg  SCD prophylaxis: ICD  MAP: goal 65-85,   LDH trends: 271, stable  Anticoagulation: warfarin dosing per pharmacy, INR goal 2-3,   Antiplatelet: ASA not indicated in LVAD population, > 6 months s/p STEMI  .      # Low flow alarms- Resolved  # HTN  Multiple low flow alarms 10/1, none reported 10/2. Verified that doppler measure is MAP vs. SBP.   - Speed optimization echo performed 10/1, speed decreased to 5100  - Losartan 50 mg BID  - Hydralazine 50 mg TID     # History of VT/VF arrest 2023  # AFib s/p DCCV 9/2024 and again 9/30/2024  - Successfully cardioverted in early September for AF. Since then EKG suggested AF on 9/21. Tele is only available  from as early as 9/22. The patient was started on hep gtt 9/21, but unclear if was in AF before this. While the patient was on hep gtt until INR was therapeutic, it is not possible to assess rhythm prior to 9/21. Systemic AC was off on 9/18-9/20. S/p MELBA and DCCV on 9/30 with conversion to sinus rhythm   - continue amiodarone 200 mg daily        # Visual changes, resolved. Stoke code call 9/29 for visual changes - right eye with decreased upper half of vision and bluish haze. Resolved after 30 minutes. Seen by opthalmology and neuro. Negative head CT and CTA head and neck. He has had 3 episodes which last about 5 minutes before resolving completely.   - Ophthalmology and Neuro, signed off  - Already on A/C  - Continue to monitor for changes     # Hypervolemic hyponatremia  Na 132, ranging 128-133  - encourage PO intake  - Bumex 0.5 mg daily, empagliflozin 10 mg daily      # Anemia  - Hgb 8.4 10/4. No overt bleeds, slow downtrend since surgery. LDH downtrending.   - Iron studies suggestive of RADHA. Started on IV venofer x5 days     # Bilateral pleural effusions  - Chest tube removed 9/30.   - Left thoracentesis done 10/1, 1 L removed, R thoracentesis done 10/3, 1 L removed.   CTM               Clinically Significant Risk Factors Present on Admission         # Hyponatremia: Lowest Na = 131 mmol/L in last 2 days, will monitor as appropriate          # Hypertension: Home medication list includes antihypertensive(s)  # End stage heart failure: Ventricular assist device (VAD) present   # Anemia: based on hgb <11           # Financial/Environmental Concerns:     # ICD device present       Jazmin Waddell MD  Hospitalist Service  Securely message with [employer] (more info)  Text page via [employer] Paging/Directory   ______________________________________________________________________    Chief Complaint   Here for rehab     History is obtained from the patient    History of Present Illness   Duane C Johnson is a 74 year old  male who has been tarnsfrred to ARU for PT and Ot and LVAD teaching nd medical optimization .  He feels well . He has no cp or sob , not using any 02  . No emesis . No pain issues. No fluid in legs .   He is eager to work with therapies       Past Medical History    Past Medical History:   Diagnosis Date    Benign essential hypertension     CAD (coronary artery disease)     Chronic systolic heart failure (H)     Hypertension     ST elevation myocardial infarction (STEMI), unspecified artery (H)     Ventricular fibrillation (H)        Past Surgical History   Past Surgical History:   Procedure Laterality Date    ANESTHESIA CARDIOVERSION N/A 9/5/2024    Procedure: Anesthesia cardioversion;  Surgeon: GENERIC ANESTHESIA PROVIDER;  [facility]    ANESTHESIA CARDIOVERSION N/A 9/30/2024    Procedure: Anesthesia cardioversion;  Surgeon: GENERIC ANESTHESIA PROVIDER;  [facility]    COLONOSCOPY N/A 3/23/2023    Procedure: COLONOSCOPY, FLEXIBLE, WITH LESION REMOVAL USING SNARE;  Surgeon: Jose Faustin MD;  Location: [facility]    CV CENTRAL VENOUS CATHETER PLACEMENT N/A 10/26/2023    Procedure: Central Venous Catheter Placement;  Surgeon: Rob Lyles MD;  Location: [facility] CARDIAC CATH LAB    CV CORONARY ANGIOGRAM N/A 10/27/2023    Procedure: Coronary Angiogram;  Surgeon: Rob Lyles MD;  Location:  HEART CARDIAC CATH LAB    CV CORONARY ANGIOGRAM N/A 10/26/2023    Procedure: Coronary Angiogram;  Surgeon: Rob Lyles MD;  Location:  HEART CARDIAC CATH LAB    CV LEFT HEART CATH N/A 10/26/2023    Procedure: Left Heart Catheterization;  Surgeon: Rob Lyles MD;  Location: [facility] CARDIAC CATH LAB    CV PCI N/A 10/27/2023    Procedure: Percutaneous Coronary Intervention;  Surgeon: Rob Lyles MD;  Location: [facility] CARDIAC CATH LAB    CV PCI STENT DRUG ELUTING N/A 10/26/2023    Procedure: Percutaneous Coronary Intervention Stent;  Surgeon: Trupti  MD Rob;  Location:  HEART CARDIAC CATH LAB    CV RIGHT HEART CATH MEASUREMENTS RECORDED N/A 5/6/2024    Procedure: Heart Cath Right Heart Cath;  Surgeon: Rob Lyles MD;  Location:  HEART CARDIAC CATH LAB    CV RIGHT HEART CATH MEASUREMENTS RECORDED N/A 9/3/2024    Procedure: Right Heart Catheterization;  Surgeon: Aaron Mesa MD;  Location:  HEART CARDIAC CATH LAB    EP ICD INSERT SINGLE N/A 5/8/2024    Procedure: Implantable Cardioverter Defibrillator Device & Lead Implant Dual;  Surgeon: Guero Black MD;  Location:  HEART CARDIAC CATH LAB    INJECTION, HYDROGEL SPACER N/A 4/22/2024    Procedure: INJECTION, HYDROGEL SPACER AND FIDUCIAL MARKER PLACEMENT;  Surgeon: Stanislaw Barros MD;  Location:  OR    INSERT VENTRICULAR ASSIST DEVICE LEFT (HEARTMATE II) N/A 9/18/2024    Procedure: Median Sternotomy, INSERTION of LEFT VENTRICULAR ASSIST DEVICE (HEARTMATE III), cardiopulmonary bypass, transesophageal echocardiogram performed by anesthesia.;  Surgeon: Mulvihill, Michael, MD;  [facility]    IRRIGATION AND DEBRIDEMENT CHEST WASHOUT, COMBINED Right 9/18/2024    Procedure: Exploration of chest, chest washout;  Surgeon: Mulvihill, Michael, MD;  Location:  OR       Medications   Current Facility-Administered Medications   Medication Dose Route Frequency Provider Last Rate Last Admin    acetaminophen (TYLENOL) tablet 650 mg  650 mg Oral Q4H PRN Toussaint Gonzalez, Paola, MD        acetaminophen (TYLENOL) tablet 975 mg  975 mg Oral Q8H Toussaint Gonzalez, Paola, MD   975 mg at 10/06/24 0646    amiodarone (PACERONE) tablet 200 mg  200 mg Oral Daily Toussaint Gonzalez, Paola, MD   200 mg at 10/06/24 0857    atorvastatin (LIPITOR) tablet 80 mg  80 mg Oral QPM Toussaint Gonzalez, Paola, MD   80 mg at 10/05/24 2038    bisacodyl (DULCOLAX) suppository 10 mg  10 mg Rectal Daily PRN Toussaint Gonzalez, Paola, MD        bumetanide (BUMEX) tablet 0.5 mg  0.5 mg Oral Daily Toussaint  Shakira Marte MD        empagliflozin (JARDIANCE) tablet 10 mg  10 mg Oral Daily Toussaint Gonzalez, Paola, MD   10 mg at 10/06/24 0858    escitalopram (LEXAPRO) tablet 10 mg  10 mg Oral or Feeding Tube Daily Toussaint Gonzalez, Paola, MD   10 mg at 10/06/24 0857    gabapentin (NEURONTIN) capsule 100 mg  100 mg Oral or Feeding Tube At Bedtime Toussaint Gonzalez, Paola, MD   100 mg at 10/05/24 2039    hydrALAZINE (APRESOLINE) tablet 50 mg  50 mg Oral TID Toussaint Gonzalez, Paola, MD   50 mg at 10/06/24 0858    lidocaine (LMX4) cream   Topical Q1H PRN Toussaint Gonzalez, Paola, MD        lidocaine 1 % 0.1-1 mL  0.1-1 mL Other Q1H PRN Toussaint Gonzalez, Paola, MD        losartan (COZAAR) tablet 50 mg  50 mg Oral BID Toussaint Gonzalez, Paola, MD   50 mg at 10/06/24 0858    magnesium oxide (MAG-OX) tablet 400 mg  400 mg Oral Daily Toussaint Gonzalez, Paola, MD   400 mg at 10/06/24 0858    melatonin tablet 10 mg  10 mg Oral At Bedtime Toussaint Gonzalez, Paola, MD   10 mg at 10/05/24 2039    methocarbamol (ROBAXIN) tablet 750 mg  750 mg Oral TID Toussaint Gonzalez, Paola, MD   750 mg at 10/06/24 0857    multivitamin w/minerals (THERA-VIT-M) tablet 1 tablet  1 tablet Oral BID Toussaint Gonzalez, Paola, MD   1 tablet at 10/06/24 0857    naloxone (NARCAN) injection 0.2 mg  0.2 mg Intravenous Q2 Min PRN Toussaint Gonzalez, Paola, MD        Or    naloxone (NARCAN) injection 0.4 mg  0.4 mg Intravenous Q2 Min PRN Toussaint Gonzalez, Paola, MD        Or    naloxone (NARCAN) injection 0.2 mg  0.2 mg Intramuscular Q2 Min PRN Toussaint Gonzalez, Paola, MD        Or    naloxone (NARCAN) injection 0.4 mg  0.4 mg Intramuscular Q2 Min PRN Toussaint Gonzalez, Paola, MD        ondansetron (ZOFRAN ODT) ODT tab 4 mg  4 mg Oral Q6H PRN Toussaint Gonzalez, Paola, MD        oxyCODONE (ROXICODONE) tablet 5 mg  5 mg Oral Q4H PRN Toussaint Gonzalez, Paola, MD        pantoprazole (PROTONIX) EC tablet 40 mg  40 mg Oral QAM AC Toussaint  Shakira Marte MD   40 mg at 10/06/24 0646    Patient is already receiving anticoagulation with heparin, enoxaparin (LOVENOX), warfarin (COUMADIN)  or other anticoagulant medication   Does not apply Continuous PRN Toussaint Gonzalez, Paola, MD        polyethylene glycol (MIRALAX) Packet 17 g  17 g Oral or Feeding Tube BID PRN Toussaint Gonzalez, Paola, MD        senna-docusate (SENOKOT-S/PERICOLACE) 8.6-50 MG per tablet 2 tablet  2 tablet Oral or Feeding Tube BID PRN Toussaint Gonzalez, Paola, MD        sodium chloride (PF) 0.9% PF flush 3 mL  3 mL Intracatheter Q8H Toussaint Gonzalez, Paola, MD   3 mL at 10/05/24 1643    sodium chloride (PF) 0.9% PF flush 3 mL  3 mL Intracatheter q1 min prn Toussaint Gonzalez, Paola, MD        Warfarin Dose Required Daily - Pharmacist Managed  1 each Oral See Admin Instructions Toussaint Gonzalez, Paola, MD              Review of Systems    10 system reviewed . Positive in HP I , rest negative    Social History   I have reviewed this patient's social history and updated it with pertinent information if needed.  Social History     Tobacco Use    Smoking status: Former     Current packs/day: 0.00     Average packs/day: 0.3 packs/day for 30.0 years (7.5 ttl pk-yrs)     Types: Cigarettes     Start date: 10/26/1993     Quit date: 10/26/2023     Years since quittin.9     Passive exposure: Past    Smokeless tobacco: Never    Tobacco comments:     Quit 10/26/2023   Vaping Use    Vaping status: Never Used   Substance Use Topics    Alcohol use: Yes     Comment: 1-2 beers per day    Drug use: No         Family History   I have reviewed this patient's family history and updated it with pertinent information if needed.  Family History   Problem Relation Age of Onset    Other Cancer Mother     Obesity Mother     GI problems Father     Uterine Cancer Sister          Allergies   Allergies   Allergen Reactions    Brilinta [Ticagrelor] Other (See Comments) and Difficulty breathing     Per pt  and spouse, hyperventilation.    Clonazepam Other (See Comments)     Per spouse, acted like a zombie and he was shaky, could barely talk.        Physical Exam   Vital Signs: Temp: 97.8  F (36.6  C) Temp src: Oral   Pulse: 77   Resp: 16 SpO2: 97 % O2 Device: None (Room air)    Weight: 147 lbs 4.28 oz  Heartmate 3 LEFT VS  Flow (Lpm): 3.4 Lpm  Pulse Index (PI): 4.5 PI  Speed (rpm): 5100 rpm  Power (jackson): 3.2 jackson        Alert and oriented . In no acute distress .  Chest ; Breath sounds are equal BL. No respiratory distress noted .  GI ; Abdomen is soft and non tender. BS positive .  Skin ; no jaundice noted.  Psych ; Aknit mood & affect.    Medical Decision Making       45 MINUTES SPENT BY ME on the date of service doing chart review, history, exam, documentation & further activities per the note.      Data     I have personally reviewed the following data over the past 24 hrs:    7.3  \   9.4 (L)   / 240     N/A N/A N/A /  N/A   N/A N/A N/A \     INR:  2.05 (H) PTT:  N/A   D-dimer:  N/A Fibrinogen:  N/A

## 2024-10-06 NOTE — CONSULTS
Social Work: Initial Assessment with Discharge Plan    Patient Name: Duane C Johnson  : 1950  Age: 74 year old  MRN: [id number]  Completed assessment with: Chart review and interview with patient.   Admitted to ARU: 10/05/2024    Presenting Information   Date of SW assessment: 2024  Health Care Directive: Provided education, pt. has a copy at home  Primary Health Care Agent: Patient/self. In absence of HCD, per  NO policy, pt spouse/Melissa would be HCD agent.  Secondary Health Care Agent: None appointed  Living Situation:  lives with spouse in house  Home Accessibility: stairs within home, stairs to enter home  Number of Stairs, Main Entrance: 3  Stair Railings, Main Entrance: railings safe and in good condition  Number of Stairs, Within Home, Primary: greater than 10 stairs  Stair Railings, Within Home, Primary: railings safe and in good condition  Previous Functional Status: Pt. was independent. [place], he does the primary cooking and yardwork. , wife assists with laundry, shopping, med management and finances.   DME available: See therapy evaluation for more information.  Patient and family understanding of hospitalization: Appropriate and Pleasant.  Cultural/Language/Spiritual Considerations: 74 year old male, , English speaking.    Physical Health  Reason for admission: Fatigue  Past medical history significant for ischemic cardiomyopathy, ASCVD, anxiety, systolic congestive heart failure, prostate cancer, hyperlipidemia, benign essential tremor, hypertension, presents to the emergency department by EMS with concerns of fatigue, poor appetite, lethargy.  History is obtained from the patient who I note upon reviewing the EHR was here 2 days ago with similar concerns.  The patient did not remember the visit.  He identifies 1 or 2 weeks perhaps longer of fatigue, no appetite, poor intake for fluids and solids.  Apparently his blood pressure has been a little bit lower  than usual at home he typically runs in the  range systolic with his ischemic cardiomyopathy and 25% EF.    Provider Information   [facility]   : No    Mental Health/Chemical Dependency:   Diagnosis: Denied.  Alcohol/Tobacco/Narcotis: pt quit smoking about 10 months ago. Support/Services in Place: None  Services Needed/Recommended: Gary and Health Psychology support while on ARU available.   Sexuality/Intimacy: Not discussed     Support System  Marital Status:    Family support: Wife Melissa  Other support available: Extended family  Areas of fulfillment/laury: Pt. has two cats    Community Resources  Current in home services: None  Previous services: None  Social support, individual psychotherapy, and medications   What associations, organizations or groups have been especially helpful to you in the past ?  Financial/Employment/Education  Employment Status: retired   Income Source: S.S.2houses and pension  Education: H.S.  Financial Concerns:  None  Insurance: MEDICARE/[employer] MEDICARE [facility]     Discharge Plan     Patient and family discharge goal: To be determined, pending progress  Provided Education on discharge plan: Evaluations and discharge recommendations pending.   Patient agreeable to discharge plan:  Pending further discussion. Evaluations and discharge recommendations pending.   Provided education and attained signature for Medicare IM and IRF Patient Rights and Privacy Information provided to patient : yes  Provided patient with [facility] [place] Resources: N/A  Barriers to discharge: chest tube, medical readiness, new VAD     Discharge Recommendations   Disposition: Pt. Will be going home.  Transportation Needs: Patient, family/friends, paid transport, insurance transport (if applicable)   Name of Transportation Company and Phone: N/A    Additional comments   Discharge TAINA VEGA 12  "days. Evals and discharge needs pending.   Pt. was very pleasant and wife is an amazing support sytem. Wife will be a good  for additional questions.  Pt is interested in getting connected to a  in [facility].    SW will continue to remain available for patient and family support, discharge planning, and access to resources.    ------------------------------------------------------------------------------------------------------------    ARTUR Pain Assessment    Pain Effect on Sleep  Over the past 5 days, how much of the time has pain made it hard for you to sleep at night?\"    0. Does not apply - I have not had any pain or hurting in the past 5 days    Pain Interference with Therapy Activities  \"Over the past 5 days, how often have you limited your participation in rehabilitation therapy sessions due to pain?\"  0. Does not apply - I have not had any pain or hurting in the past 5 days    Pain Interference with Day-to-Day Activities  \"Over the past 5 days, how often have you limited your day-to-day activities (excluding rehabilitation therapy sessions) because of pain?\"  1. Rarely or not at all  -------------------------------------------------------------------------------------------------------------    TRANSPORTATION:    Has lack of transportation kept you from medical appointments, meetings, work, or from getting things needed for daily living?  A. Yes, it has kept me from medical appointments or from getting my medications  B. Yes, it has kept me from non-medical meetings, appointments, work or from getting things that I need  C. No  X. Patient Unable to respond  Y. Patient declines to respond  -------------------------------------------------------------------------------------------------------------  Health Literacy:   How Often do you need to have someone help you when you read instructions, pamphlets, or other written material from your doctor or pharmacy?  0.       Never  1.  "      Rarely  2.       Sometimes  3.       Often  4.       Always  5.       Patient declines to respond  6.       Patient unable to respond  ------------------------------------------------------------------------------------------------------------    SOCIAL ISOLATION  How often do you feel lonely or isolated from those around you?  0.       Never  1.       Rarely  2.       Sometimes  3.       Often  4.       Always  5.       Patient declines to respond  6.       Patient unable to respond  -------------------------------------------------------------------------------------------------------------        Gunjan Huerta, [facility] Acute Inpatient Re[facility]  [address], [address] [place] [address]  Phone: [phone]  Fax: [phone]

## 2024-10-06 NOTE — PLAN OF CARE
Goal Outcome Evaluation:      Plan of Care Reviewed With: patient    Overall Patient Progress: no changeOverall Patient Progress: no change    Patient is here post lvad heartmate 3. Is alert and oriented x4. Make needs known. Denied any pain, sob, dizziness, fever, or any weakness. SBA w/o device in transfers. Continent of B&B, uses the urinal for voiding and staff to empty. VSS, MAP at 80 via doppler. Lvad parameters in range. Assisted with ordering breakfast this morning. No change overnight. Not in distress. Continue poc.

## 2024-10-06 NOTE — PLAN OF CARE
"Discharge Plan: home w/ spouse assist    Precautions: fall, LVAD    Current Status:  Bed Mobility: Viri rolling, Sup<>sit SBA w/ bedrail   Transfer: SBA w/ 4WW   Gait: Up to 200' SBA w/ 4WW   Stairs: 6\" x4 w/ joel railings, CGA  Balance: Stable dynamic sitting, SBA for unsupported dynamic standing     Outcome Measures:   5TSTS     10MWT    6MWT    Assessment:  Pt presents below baseline following complex cardiac hx w/ new LVAD on 9/18/24. Goals for Viri w/ walker at discharge, SBA for stairs and community mobility. Spouse previously providing IADL assist. Pulse index drops to 2.3 following amb, but quickly returns to >2.5 following rest. All other LVAD parameters WNL. Pt will benefit from skilled physical therapy to promote improved functional endurance and safety with mobility. ELOS 7 days.     Other Barriers to Discharge (DME, Family Training, etc):   3STE w/ railings. 10STI w/ chair lift available    Family training: TBD   "

## 2024-10-06 NOTE — PHARMACY-MEDICATION REGIMEN REVIEW
Pharmacy Medication Regimen Review  Duane C Johnson is a 74 year old male who is currently in the Acute Rehab Unit.    Assessment: All medications have an appropriate indications, durations and no unnecessary use was found    Plan:   Continue medications as prescribed.   Attending provider will be sent this note for review.  If there are any emergent issues noted above, pharmacist will contact provider directly by phone.      Pharmacy will periodically review the resident's medication regimen for any PRN medications not administered in > 72 hours and discontinue them. The pharmacist will discuss gradual dose reductions of psychopharmacologic medications with interdisciplinary team on a regular basis.    Please contact pharmacy if the above does not answer specific medication questions/concerns.    Background:  A pharmacist has reviewed all medications and pertinent medical history today.  Medications were reviewed for appropriate use and any irregularities found are listed with recommendations.      Current Facility-Administered Medications:     acetaminophen (TYLENOL) tablet 650 mg, 650 mg, Oral, Q4H PRN, Toussaint Gonzalez, Paola, MD    acetaminophen (TYLENOL) tablet 975 mg, 975 mg, Oral, Q8H, Toussaint Gonzalez, Paola, MD, 975 mg at 10/06/24 0646    amiodarone (PACERONE) tablet 200 mg, 200 mg, Oral, Daily, Toussaint Gonzalez, Paola, MD, 200 mg at 10/06/24 0857    atorvastatin (LIPITOR) tablet 80 mg, 80 mg, Oral, QPM, Toussaint Gonzalez, Paola, MD, 80 mg at 10/05/24 2038    bisacodyl (DULCOLAX) suppository 10 mg, 10 mg, Rectal, Daily PRN, Toussaint Gonzalez, Paola, MD    bumetanide (BUMEX) tablet 0.5 mg, 0.5 mg, Oral, Daily, Toussaint Gonzalez, Paola, MD    empagliflozin (JARDIANCE) tablet 10 mg, 10 mg, Oral, Daily, Toussaint Gonzalez, Paola, MD, 10 mg at 10/06/24 0858    escitalopram (LEXAPRO) tablet 10 mg, 10 mg, Oral or Feeding Tube, Daily, Toussaint Gonzalez, Paola, MD, 10 mg at 10/06/24 0857    gabapentin  (NEURONTIN) capsule 100 mg, 100 mg, Oral or Feeding Tube, At Bedtime, Toussaint Gonzalez, Paola, MD, 100 mg at 10/05/24 2039    hydrALAZINE (APRESOLINE) tablet 50 mg, 50 mg, Oral, TID, Toussaint Gonzalez, Paola, MD, 50 mg at 10/06/24 0858    lidocaine (LMX4) cream, , Topical, Q1H PRN, Toussaint Gonzalez, Paola, MD    lidocaine 1 % 0.1-1 mL, 0.1-1 mL, Other, Q1H PRN, Toussaint Gonzalez, Paola, MD    losartan (COZAAR) tablet 50 mg, 50 mg, Oral, BID, Toussaint Gonzalez, Paola, MD, 50 mg at 10/06/24 0858    magnesium oxide (MAG-OX) tablet 400 mg, 400 mg, Oral, Daily, Toussaint Gonzalez, Paola, MD, 400 mg at 10/06/24 0858    melatonin tablet 10 mg, 10 mg, Oral, At Bedtime, Toussaint Gonzalez, Paola, MD, 10 mg at 10/05/24 2039    methocarbamol (ROBAXIN) tablet 750 mg, 750 mg, Oral, TID, Toussaint Gonzalez, Paola, MD, 750 mg at 10/06/24 0857    multivitamin w/minerals (THERA-VIT-M) tablet 1 tablet, 1 tablet, Oral, BID, Toussaint Gonzalez, Paola, MD, 1 tablet at 10/06/24 0857    naloxone (NARCAN) injection 0.2 mg, 0.2 mg, Intravenous, Q2 Min PRN **OR** naloxone (NARCAN) injection 0.4 mg, 0.4 mg, Intravenous, Q2 Min PRN **OR** naloxone (NARCAN) injection 0.2 mg, 0.2 mg, Intramuscular, Q2 Min PRN **OR** naloxone (NARCAN) injection 0.4 mg, 0.4 mg, Intramuscular, Q2 Min PRN, Toussaint Gonzalez, Paola, MD    ondansetron (ZOFRAN ODT) ODT tab 4 mg, 4 mg, Oral, Q6H PRN, Toussaint Gonzalez, Paola, MD    oxyCODONE (ROXICODONE) tablet 5 mg, 5 mg, Oral, Q4H PRN, Toussaint Gonzalez, Paola, MD    pantoprazole (PROTONIX) EC tablet 40 mg, 40 mg, Oral, QAM AC, Toussaint Gonzalez, Paola, MD, 40 mg at 10/06/24 0646    Patient is already receiving anticoagulation with heparin, enoxaparin (LOVENOX), warfarin (COUMADIN)  or other anticoagulant medication, , Does not apply, Continuous PRN, Toussaint Gonzalez, Paola, MD    polyethylene glycol (MIRALAX) Packet 17 g, 17 g, Oral or Feeding Tube, BID PRN, Toussaint Gonzalez, Paola, MD     senna-docusate (SENOKOT-S/PERICOLACE) 8.6-50 MG per tablet 2 tablet, 2 tablet, Oral or Feeding Tube, BID PRN, Toussaint Gonzalez, Paola, MD    sodium chloride (PF) 0.9% PF flush 3 mL, 3 mL, Intracatheter, Q8H, Toussaint Gonzalez, Paola, MD, 3 mL at 10/05/24 1643    sodium chloride (PF) 0.9% PF flush 3 mL, 3 mL, Intracatheter, q1 min prn, Toussaint Gonzalez, Paola, MD    Warfarin Dose Required Daily - Pharmacist Managed, 1 each, Oral, See Admin Instructions, Toussaint Gonzalez, Paola, MD  No current outpatient prescriptions on file.   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

## 2024-10-06 NOTE — PLAN OF CARE
"Discharge Planner Post-Acute Rehab SLP:     Discharge Plan: home with wife. Ongoing SLP TBD    Precautions: LVAD    Current Status:  Hearing: Increased volume  Vision: Readers  Communication: Motor speech, language intact  Cognition: Mild cognitive impairment- severe short term memory deficits  Swallow: Easy to chew solids (7)/Thin liquids (0). Pt reported due to lack of dentition, only wants to eat chicken noodle soup and mashed potatoes. Clinical swallow evaluation not indicated at this time, MD can manage diet texture as it is related to dentition status only.     Assessment: Motor speech, language intact. RBANS form A administered and interpreted. Pt's overalls core in low average range, greatest deficits in delayed recall. Pt reported changes in short term memory over last 5 years, also reported he has had a hard time remembering peoples' names \"for 40 years.\" Skilled SLP services indicated to develop and train pt in cognitive strategies to promote independence and safety. Pt on easy to chew solids, pt reported due to lack of dentition, only wants to eat chicken noodle soup and mashed potatoes. Clinical swallow evaluation not indicated at this time, MD can manage diet texture as it is related to dentition status only.     Other Barriers to Discharge (Family Training, etc): level of assist/supervision with meds, financial, and LVAD mgmt?    "

## 2024-10-06 NOTE — PROGRESS NOTES
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 Due to worsening functional limitations due to heart failure, patient was worked up for VAD while inpatient. Patient is now s/p implantation of HeartMate 3 LVAD on 9/18/2024. He returned to OR on POD 0 for mediastinal re-exploration, mediastinal clot evacuation, and chest washout.      Postoperatively was admitted to the CVICU.  Patient was extubated on POD #2.  Blood pressure and cardiac index were managed with vasopressors and inotropic agents which were continuously weaned until no longer needed.  Patient was subsequently  transferred to the surgical telemetry floor.     While on the surgical unit, the patient continued to progress well. Chest tubes and temporary pacemaker wires were removed when deemed appropriate. Developed bilateral pleural effusions for which bilateral thoracentesis was performed. Left side had 800 mL drained and right side had 1000 mL drained.     Patient was fluid overloaded and treated with diuretics. Patient was transiently hyperglycemic and treated with insulin infusion then transitioned to sliding scale insulin per protocol. Blood sugars remained stable. No further glycemic control agents needed at this time.     Developed transient right eye visual deficit. Neurology consulted - recommend CT head and CTA head/neck to rule out stroke - without evidence of acute pathology/significant stenosis. Opthalmology consulted - no evidence of CRAO. Most likely diagnosis is " "atypical amaurosis vs possible impending CRVO.\" SLP consult received for evaluation and treatment as indicated.   General Observations Acute OT administered SLUMS cognitive screen 10/03, pt scored 17/30 indicated cognitive deficits. Full cognitive-linguistic evaluation recommended with ARU SLP.   Type of Evaluation   Type of Evaluation Speech, Language, Cognition   Motor Speech   Speech Intelligibility (Motor Speech) WFL;conversational level   Auditory Comprehension   Follows Commands (Auditory Comprehension) WFL   Yes/No Questions (Auditory Comprehension) WFL   Verbal Expression   Conversational Speech (Verbal Expression) WFL;connected speech   Cognition   Cognitive Function memory deficit   Cognitive Status Alert, pleasant, cooperative   Additional cognitive-linguistic evaluation indicated  Completed   Cognitive Status Exam Comments RBANS form A administered and interpreted, please see progress note/below for details.   Orientation Status (Cognition) oriented to;person;place;situation;time   Affect/Mental Status (Cognition) WFL   Follows Commands (Cognition) follows multi-step commands;over 90% accuracy   Memory Deficit (Cognition) immediate recall;short-term memory   General Therapy Interventions   Planned Therapy Interventions Cognitive Treatment   Cognitive treatment Internal memory strategy training;External memory strategy training;Progressive attention training   Clinical Impression   Criteria for Skilled Therapeutic Interventions Met (SLP Eval) Yes, treatment indicated   SLP Diagnosis Mild cognitive impairment   Problem List (SLP) Immediate and short term memory deficits   Activity Limitations Related to Problem List (SLP) Assist/supervision with iADLs, LVAD management   Risks & Benefits of therapy have been explained evaluation/treatment results reviewed;care plan/treatment goals reviewed;participants voiced agreement with care plan;participants included;patient   Clinical Impression Comments SLP: Motor " "speech, language intact. RBANS form A administered and interpreted. Pt's overalls core in low average range, greatest deficits in delayed recall. Pt reported changes in short term memory over last 5 years, also reported he has had a hard time remembering peoples' names \"for 40 years.\" Skilled SLP services indicated to develop and train pt in cognitive strategies to promote independence and safety. Pt on easy to chew solids, pt reported due to lack of dentition, only wants to eat chicken noodle soup and mashed potatoes. Clinical swallow evaluation not indicated at this time, MD can manage diet texture as it is related to dentition status only.   SLP Total Evaluation Time   Cognitive  Performance Testing Minutes, per hour - includes time for administering test, interp results & prep report (29951) 60   SLP Goals   Therapy Frequency (SLP Eval) 6 times/week   SLP Predicted Duration/Target Date for Goal Attainment 10/12/24   SLP Goals SLP Goal 1;SLP Goal 2   SLP: Goal 1 With use of trained memory strategies, pt will recall daily/functional information with 100% accuracy.   SLP: Goal 2 Patient will complete high level/compelxity reasoning/problem solving with 90% or greater independent accuracy.   SLP Discharge Planning   SLP Plan SLP: Intro memory strategies, has pencil/notepad at bedside. HAWA as treatment. FAVRES as task for high level reasoning/problem solving. Decrease time as indicated.   Total Session Time   Timed Code Treatment Minutes 60   Total Session Time (sum of timed and untimed services) 60   SLP - Acute Rehab Center Time   Individual Time (minutes) - SLP 60   Group Time (minutes) - SLP 0   Concurrent Time (minutes) - SLP 0   Co-Treatment Time (minutes) - SLP 0   ARC Total Session Time (minutes) - SLP 60   ARC Daily Total Session Time   SLP ARC Daily Total Session Time 60   ARC Daily Rehab Total Minutes 180         Repeatable Battery for the Assessment of Neuropsychological Status (RBANS) FORM    Immediate " Memory Visuospatial/  Constructional Language Attention Delayed Memory Total Scale   Index Score 73 131 96 94 60 86   Percentile Rank 4 98 39 34 0.4 18     SLP:  Pt seen for administration of RBANS. Results are based on a mean of 100 and a standard deviation of +/- 15.   Interpretation: Please see clinical impressions above.  Face to Face Administration: 45  Scoring/Interpretation: 15  Total Time: 60

## 2024-10-06 NOTE — PROGRESS NOTES
"   10/06/24 0958   Appointment Info   Signing Clinician's Name / Credentials (PT) Abena Harrison DPT   Living Environment   People in Home spouse   Current Living Arrangements house   Number of Stairs, Main Entrance 3   Stair Railings, Main Entrance railings safe and in good condition   Stair Railings, Within Home, Primary   (10 STI, chair lift available)   Transportation Anticipated family or friend will provide   Living Environment Comments Pt lives in a 2 story home, bedroom and bathroom on 2nd floor. Pt has a stairlift to get upstairs.Pt has a walk in shower. Grab bars in shower. Pt reports he has grab bars by toilet. Sleeps in \"air bed\" at baseline.   Self-Care   Usual Activity Tolerance moderate   Current Activity Tolerance fair   Equipment Currently Used at Home grab bar, toilet;grab bar, tub/shower   Fall history within last six months no   Activity/Exercise/Self-Care Comment Pt was IND w/ all mobility and ADLs at baseline. Pt would do the cooking, spouse helped with finances/meds.   Post-Acute Assessment Only   Post-Acute Functional Assessment See below   Previous Level of Function/Home Environm   Bathing, Previous Functional Level independent   Grooming, Previous Functional Level independent   Dressing, Previous Functional Level independent   Eating/Feeding, Previous Functional Level independent   Toileting, Previous Functional Level independent   BADLs, Previous Functional Level independent   IADLs, Previous Functional Level partial assistance   Bed Mobility, Previous Functional Level independent   Transfers, Previous Functional Level independent   Household Ambulation, Previous Functional Level independent   Stairs, Previous Functional Level independent   Community Ambulation, Previous Functional Level independent   Functional Cognition, Previous Functional Level WFL   General Information   Onset of Illness/Injury or Date of Surgery 09/18/24   Referring Physician Dr Nathen Cruz MD "   Patient/Family Therapy Goals Statement (PT) Be able to walk outside over uneven surfaces on his property.   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 Due to worsening functional limitations due to heart failure, patient was worked up for VAD while inpatient. Patient is now s/p implantation of HeartMate 3 LVAD on 9/18/2024. His post-operative course was complicated by Afib, pain, anxiety, insomnia, and transient right eye visual deficit. He has also required medical mgmt of his acute blood loss anemia, fluid overload, hyperglycemia, hyponatremia and B pleural effusions. He is now medically stable, admitted to rehab on 10/05/2024 in setting of impaired strength, impaired balance and impaired activity tolerance.   General Observations All LVAD parameters WNL, except PI drops to 2.3 following amb bout, quicly returns to >2.5 following rest.   Cognition   Cognitive Status Comments Pt with dificutly recalling past ~12 months of medical events. He knows he had a heart attack, but timeline unclear. Unable to recall room number and needing cueing for navigating back to room. Tangenetial in conversation, redirectable. Able to recall home information consistent w/ chart.   Integumentary/Edema   Integumentary/Edema Comments No LE edema, pt reports more edema when in hospital so he   Range of Motion (ROM)   ROM Comment WFL   Strength (Manual Muscle Testing)   Strength Comments BLEs grossly 4/5.   Balance   Balance Comments Stable dynamic sitting, able to take few unsupported dynamic steps, SBA. One minor LOB w/ turning, Maria A to stabilize in sitting.   Sensory Examination   Sensory Perception  Comments Intact light touch BLEs.   Clinical Impression   Criteria for Skilled Therapeutic Intervention Yes, treatment indicated   PT Diagnosis (PT) s/p LVAD heartmate 3   Influenced by the following impairments Decreased activity tolerance, impaired cognition, LE weakness.   Functional limitations due to impairments Impaired gait and mobility   Clinical Presentation (PT Evaluation Complexity) evolving   Clinical Presentation Rationale >2 personal factors and comorbidities impacting plan of care and pt presentation   Clinical Decision Making (Complexity) moderate complexity   Planned Therapy Interventions (PT) balance training;bed mobility training;gait training;home exercise program;neuromuscular re-education;stair training;strengthening;transfer training;risk factor education;home program guidelines   Risk & Benefits of therapy have been explained evaluation/treatment results reviewed;care plan/treatment goals reviewed;risks/benefits reviewed;current/potential barriers reviewed;participants voiced agreement with care plan;participants included;patient   Clinical Impression Comments Pt presents below baseline following complex cardiac hx w/ new LVAD on 9/18/24. Goals for Viri w/ walker at discharge, SBA for stairs and community mobility. Spouse previously providing IADL assist. Pulse index drops to 2.3 following amb, but quickly returns to >2.5 following rest. All other LVAD parameters WNL. Pt will benefit from skilled physical therapy to promote improved functional endurance and safety with mobility. ELOS 7 days.   PT Total Evaluation Time   PT Eval, Moderate Complexity Minutes (66206) 15   Physical Therapy Goals   PT Frequency 6x/week   PT Predicted Duration/Target Date for Goal Attainment 10/12/24   PT Goals Bed Mobility;Transfers;Gait;Stairs;PT Goal 1;PT Goal 2;PT Goal 3   PT: Bed Mobility Independent   PT: Transfers Supervision/stand-by assist   PT: Gait Modified independent;150 feet   PT: Stairs  Supervision/stand-by assist;Greater than 10 stairs   PT: Goal 1 Car transfer w/ SBA, walker   PT: Goal 2 Pt and spouse will be able to independently verbalize 3 fall prevention strategies following education.   Interventions   Interventions Quick Adds Therapeutic Activity;Therapeutic Procedure   Therapeutic Activity   Therapeutic Activities: dynamic activities to improve functional performance Minutes (00446) 45   Treatment Detail/Skilled Intervention See FARNAZ. Obtained 4WW and wheelchair for OOR activity. Rearranged room to optimize functional ind and safety w/ LVAD use on battery and wall power. Pt SBA and setup assist with switching over  LVAD to battery, seems to have good insight into needing to track values. Ursula for donning LVAD vest. Frequent rest breaks d/t SOB, O2 sats >95% on RA.   PT Discharge Planning   PT Plan Monitor PI. FARNAZ pickup, uneven. 10MWT, 5TSTS, 6MWT.   Total Session Time   Timed Code Treatment Minutes 45   Total Session Time (sum of timed and untimed services) 60   Post Acute Settings Only   What unit is patient on? Acute Rehab   PT - Acute Rehab Center Time   Individual Time (minutes) - PT 60   Group Time (minutes) - PT 0   Concurrent Time (minutes) - PT 0   Co-Treatment Time (minutes) - PT 0   ARC Total Session Time (minutes) - PT 60   ARC Daily Total Session Time   PT ARC Daily Total Session Time 60   ARC Daily Rehab Total Minutes 120   Lower Body Dressing putting on/taking off footwear   Describe performance IND w/ donning socks   Chair/bed-to-chair Transfer   Describe performance CGA w/ walker   Roll Left and Right   Assistance Needed Supervision   Roll Left to Right CARE Score 4   Sit to Lying   Assistance Needed Adaptive equipment;Supervision   Sit to Lying CARE Score 4   Lying to Sitting on Side of Bed   Assistance Needed Adaptive equipment;Supervision   Lying to Sitting CARE Score 4   Sit to Stand   Comment CGA w/ FWW. Progresses to SBA w/ 4WW, needs cueing for brake management.    Locomotion   Locomotion Comment Amb total 200' w/ FWW and ~2x100' w/ 4WW, CGA>SBA   Walk 10 Feet   Assistance Needed Adaptive equipment   Physical Assistance Level Contact guard assist   Walk 10 Ft. CARE Score 4   Walk 50 Feet with Two Turns   Patient Performance Adaptive equipment   Physical Assistance Level Contact guard assist   Walk 50 Ft. CARE Score 4   Walk 150 Feet   Assistance Needed Adaptive equipment   Physical Assistance Level Contact guard assist   Walk 150 Ft. CARE Score 4   Walking 10 Feet on Uneven Surfaces   Assistance Needed Adaptive equipment   Physical Assistance Level Contact guard assist   Walking 10 Feet on Uneven Surfaces CARE Score 4   Wheel 50 Feet with Two Turns   Reason if not Attempted Activity not applicable   Wheel 50 Feet with Two Turns CARE Score 9   Wheel 150 Feet   Reason if not Attempted Activity not applicable   Wheel 150 Feet CARE Score 9   1 Step (Curb)   Assistance Needed Adaptive equipment   Physical Assistance Level Contact guard assist   1 Step CARE Score 4   4 Steps   Assistance Needed Adaptive equipment   Physical Assistance Level Contact guard assist   4 Steps CARE Score 4   12 Steps   Comment Limited by SOB and poor activity tolerance   Reason if not Attempted Safety concerns   12 Steps CARE Score 88   Car Transfer   Assistance Needed Adaptive equipment   Physical Assistance Level Contact guard assist   Car Transfer CARE Score 4

## 2024-10-06 NOTE — PROGRESS NOTES
10/06/24 0900   Appointment Info   Signing Clinician's Name / Credentials (OT) Alexa Elier, OTR/L   Living Environment   People in Home spouse   Current Living Arrangements house   Home Accessibility stairs to enter home;stairs within home   Number of Stairs, Main Entrance 3   Stair Railings, Main Entrance railings safe and in good condition;railings on both sides of stairs   Number of Stairs, Within Home, Primary greater than 10 stairs  (Pt has a stairlift to get upstairs)   Stair Railings, Within Home, Primary railings safe and in good condition;railing on left side (ascending)   Transportation Anticipated family or friend will provide   Living Environment Comments Pt lives in a 2 story home, bedroom and bathroom on 2nd floor. Pt has a stairlift to get upstairs.Pt has a walk in shower. Grab bars in shower. Pt reports he has grab bars by toilet.   Self-Care   Usual Activity Tolerance moderate   Current Activity Tolerance fair   Equipment Currently Used at Home grab bar, toilet;grab bar, tub/shower   Fall history within last six months no   Activity/Exercise/Self-Care Comment Pt was IND w/ all mobility and ADLs at baseline.   Instrumental Activities of Daily Living (IADL)   Previous Responsibilities meal prep;yardwork;driving   IADL Comments [place], he does the primary cooking and yardwork. , wife assists with laundry, shopping, med management and finances.   Previous Level of Function/Home Environm   Bathing, Previous Functional Level independent   Grooming, Previous Functional Level independent   Dressing, Previous Functional Level independent   Eating/Feeding, Previous Functional Level independent   Toileting, Previous Functional Level independent   BADLs, Previous Functional Level independent   IADLs, Previous Functional Level independent   Bed Mobility, Previous Functional Level independent   Transfers, Previous Functional Level independent   Household Ambulation, Previous Functional Level  independent   Stairs, Previous Functional Level independent   Community Ambulation, Previous Functional Level independent   General Information   Onset of Illness/Injury or Date of Surgery 08/30/24   Referring Physician Alee Cruz M   Patient/Family Therapy Goal Statement (OT) To get up and walk independently.   Existing Precautions/Restrictions cardiac;fall;sternal   Limitations/Impairments safety/cognitive   Left Upper Extremity (Weight-bearing Status) full weight-bearing (FWB)   Right Upper Extremity (Weight-bearing Status) full weight-bearing (FWB)   Left Lower Extremity (Weight-bearing Status) full weight-bearing (FWB)   Right Lower Extremity (Weight-bearing Status) full weight-bearing (FWB)   Cognitive Status Examination   Orientation Status orientation to person, place and time   Follows Commands WNL   Cognitive Status Comments Pt scored 17/30 on SLUMS 10/3/24   Visual Perception   Visual Impairment/Limitations corrective lenses for reading   Impact of Vision Impairment on Function (Vision) Pt reports a shadow in upper right half of R eye   Sensory   Sensory Quick Adds sensation intact   Pain Assessment   Patient Currently in Pain No   Posture   Posture protracted shoulders   Range of Motion Comprehensive   General Range of Motion no range of motion deficits identified   Strength Comprehensive (MMT)   General Manual Muscle Testing (MMT) Assessment hand strength deficits identified   Comment, General Manual Muscle Testing (MMT) Assessment general deconditioning   Hand Strength   Right hand  (pounds) 50   Left hand  (pounds) 50   Coordination   Left hand, nine hole peg test (seconds) 28   Right hand, nine hole peg test (seconds) 24   Coordination Comments Pt reports some difficulty picking up some objects and dropping some objects.   Clinical Impression   Criteria for Skilled Therapeutic Interventions Met (OT) Yes, treatment indicated   OT Diagnosis below baseline with ADLs/IADLs  "  Influenced by the following impairments activity tolerance, LB dressing, LB bathing, functional mobility, cognition   OT Problem List-Impairments impacting ADL problems related to;activity tolerance impaired;balance;cognition;mobility;strength   Assessment of Occupational Performance 3-5 Performance Deficits   Planned Therapy Interventions (OT) ADL retraining;IADL retraining;fine motor coordination training;groups;cognition;strengthening;progressive activity/exercise   Clinical Decision Making Complexity (OT) problem focused assessment/low complexity   Risk & Benefits of therapy have been explained evaluation/treatment results reviewed;care plan/treatment goals reviewed;risks/benefits reviewed;current/potential barriers reviewed;participants included;participants voiced agreement with care plan;patient   Clinical Impression Comments Per chart \"Duane C Johnson is a 74 year old right hand dominant male with a PMH of HFrEF 2/2 ICM with LVEF 20-30%, atrial fibrillation, 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 Patient is now s/p implantation of HeartMate 3 LVAD on 9/18/2024. His post-operative course was complicated by Afib, pain, anxiety, insomnia, and transient right eye visual deficit. Admitted to rehab on 10/05/2024 in setting of impaired strength, impaired balance and impaired activity tolerance.\" Pt is below baseline for ADLs/IADLs post LVAD, would benefit from skilled OT to return to PLOF. ELOS 7 days   OT Total Evaluation Time   OT Eval, Low Complexity Minutes (23007) 10   OT Goals   Therapy Frequency (OT) 6 times/week   OT Predicted Duration/Target Date for Goal Attainment 10/12/24   OT Goals Meal Preparation;Lower Body Dressing;Lower Body Bathing;OT Goal 1;Hygiene/Grooming   OT: Hygiene/Grooming modified independent   OT: Lower Body Dressing Modified independent   OT: Lower Body Bathing Modified " independent   OT: Meal Preparation Modified independent   OT: Goal 1 Pt demonstrate independence with HEP to increase strength and ind with ADLs   Self-Care/Home Management   Self-Care/Home Mgmt/ADL, Compensatory, Meal Prep Minutes (41931) 50   Symptoms Noted During/After Treatment (Meal Preparation/Planning Training) fatigue   Treatment Detail/Skilled Intervention Pt greeted lying supine in bed. OT eval completed and treatment initiated. Pt educated on role of OT and acute rehab. Therapisat reviewed sternal precautions with patient. Pt SBA and VCs for bed mobility to adhere to sternal prec. Pt completed dressing seated EOB. Pt ambulated with FWW and CGA. Simulated a toilet transfer, CGA FWW. Pt completed g/h standing at sink SBA. Pt returned to bed and SBA for sit to supine bed mobility with use of AE. Pt left supine in room with CL in reach and current needs met. See below for additional details.   OT Discharge Planning   OT Plan OT: Sponge bath EOB (new LVAD), complete remaining GGs, activity tolerance, standing ADLs with FWW   Total Session Time   Timed Code Treatment Minutes 50   Total Session Time (sum of timed and untimed services) 60   Post Acute Settings Only   What unit is patient on? Acute Rehab   OT - Acute Rehab Center Time   Individual Time (minutes) - OT 60   ARC Total Session Time (minutes) - OT 60   ARC Daily Total Session Time   OT ARC Daily Total Session Time 60   ARC Daily Rehab Total Minutes 60   Grooming (except oral cares)   Grooming Task Performed Washing hands;Washing face   Grooming Comment SBA at sink   Upper Body Dressing   Describe performance set up A seated   Lower Body Dressing (Pants/Undergarments)   Describe performance min A to pull over hips   Toilet Transfer   Describe performance CGA with grab bar and FWW   Sit to Lying   Assistance Needed Adaptive equipment   Comment min verbal cues for following sternal prec   Sit to Lying CARE Score 6   Lying to Sitting on Side of Bed    Assistance Needed Adaptive equipment   Comment min verbal cues for following sternal prec   Lying to Sitting CARE Score 6   Sit to Stand   Comment CGA with FWW

## 2024-10-06 NOTE — PLAN OF CARE
Goal Outcome Evaluation:      Plan of Care Reviewed With: patient    Overall Patient Progress: no change    Patient is alert and oriented x4. Can make needs known. Calls appropriately. Continent of bowel and bladder, uses urinal at bedside.Last bm 10/4/24. On regular diet, thin liquids, takes pills whole. Gets up with one assist, gait belt and rolling walker. LVAD Heartmate 3, within parameters. Denies any pain.

## 2024-10-07 ENCOUNTER — APPOINTMENT (OUTPATIENT)
Dept: PHYSICAL THERAPY | Facility: CLINIC | Age: 74
DRG: 949 | End: 2024-10-07
Attending: PHYSICAL MEDICINE & REHABILITATION
Payer: MEDICARE

## 2024-10-07 ENCOUNTER — APPOINTMENT (OUTPATIENT)
Dept: OCCUPATIONAL THERAPY | Facility: CLINIC | Age: 74
DRG: 949 | End: 2024-10-07
Attending: PHYSICAL MEDICINE & REHABILITATION
Payer: MEDICARE

## 2024-10-07 ENCOUNTER — APPOINTMENT (OUTPATIENT)
Dept: SPEECH THERAPY | Facility: CLINIC | Age: 74
DRG: 949 | End: 2024-10-07
Attending: PHYSICAL MEDICINE & REHABILITATION
Payer: MEDICARE

## 2024-10-07 ENCOUNTER — DOCUMENTATION ONLY (OUTPATIENT)
Dept: ANTICOAGULATION | Facility: CLINIC | Age: 74
End: 2024-10-07
Payer: MEDICARE

## 2024-10-07 DIAGNOSIS — I50.22 CHRONIC SYSTOLIC CONGESTIVE HEART FAILURE (H): Primary | ICD-10-CM

## 2024-10-07 DIAGNOSIS — Z95.811 LVAD (LEFT VENTRICULAR ASSIST DEVICE) PRESENT (H): ICD-10-CM

## 2024-10-07 DIAGNOSIS — Z79.01 ANTICOAGULATED ON WARFARIN: ICD-10-CM

## 2024-10-07 LAB
ANION GAP SERPL CALCULATED.3IONS-SCNC: 7 MMOL/L (ref 7–15)
BUN SERPL-MCNC: 16.8 MG/DL (ref 8–23)
CALCIUM SERPL-MCNC: 8.4 MG/DL (ref 8.8–10.4)
CHLORIDE SERPL-SCNC: 99 MMOL/L (ref 98–107)
CREAT SERPL-MCNC: 0.79 MG/DL (ref 0.67–1.17)
EGFRCR SERPLBLD CKD-EPI 2021: >90 ML/MIN/1.73M2
ERYTHROCYTE [DISTWIDTH] IN BLOOD BY AUTOMATED COUNT: 17.2 % (ref 10–15)
GLUCOSE SERPL-MCNC: 87 MG/DL (ref 70–99)
HCO3 SERPL-SCNC: 26 MMOL/L (ref 22–29)
HCT VFR BLD AUTO: 27.3 % (ref 40–53)
HGB BLD-MCNC: 8.9 G/DL (ref 13.3–17.7)
INR PPP: 2.15 (ref 0.85–1.15)
LDH SERPL L TO P-CCNC: 230 U/L (ref 0–250)
MCH RBC QN AUTO: 31.2 PG (ref 26.5–33)
MCHC RBC AUTO-ENTMCNC: 32.6 G/DL (ref 31.5–36.5)
MCV RBC AUTO: 96 FL (ref 78–100)
PLATELET # BLD AUTO: 211 10E3/UL (ref 150–450)
POTASSIUM SERPL-SCNC: 4.1 MMOL/L (ref 3.4–5.3)
RBC # BLD AUTO: 2.85 10E6/UL (ref 4.4–5.9)
SODIUM SERPL-SCNC: 132 MMOL/L (ref 135–145)
WBC # BLD AUTO: 5.8 10E3/UL (ref 4–11)

## 2024-10-07 PROCEDURE — 85610 PROTHROMBIN TIME: CPT

## 2024-10-07 PROCEDURE — 99232 SBSQ HOSP IP/OBS MODERATE 35: CPT | Mod: GC

## 2024-10-07 PROCEDURE — 250N000013 HC RX MED GY IP 250 OP 250 PS 637: Performed by: PHYSICAL MEDICINE & REHABILITATION

## 2024-10-07 PROCEDURE — 99233 SBSQ HOSP IP/OBS HIGH 50: CPT | Performed by: INTERNAL MEDICINE

## 2024-10-07 PROCEDURE — 36415 COLL VENOUS BLD VENIPUNCTURE: CPT | Performed by: INTERNAL MEDICINE

## 2024-10-07 PROCEDURE — 80048 BASIC METABOLIC PNL TOTAL CA: CPT | Performed by: INTERNAL MEDICINE

## 2024-10-07 PROCEDURE — 83615 LACTATE (LD) (LDH) ENZYME: CPT | Performed by: INTERNAL MEDICINE

## 2024-10-07 PROCEDURE — 250N000013 HC RX MED GY IP 250 OP 250 PS 637: Performed by: INTERNAL MEDICINE

## 2024-10-07 PROCEDURE — 128N000003 HC R&B REHAB

## 2024-10-07 PROCEDURE — 97535 SELF CARE MNGMENT TRAINING: CPT | Mod: GO | Performed by: OCCUPATIONAL THERAPIST

## 2024-10-07 PROCEDURE — 97110 THERAPEUTIC EXERCISES: CPT | Mod: GO | Performed by: OCCUPATIONAL THERAPIST

## 2024-10-07 PROCEDURE — 97530 THERAPEUTIC ACTIVITIES: CPT | Mod: GP | Performed by: REHABILITATION PRACTITIONER

## 2024-10-07 PROCEDURE — 85027 COMPLETE CBC AUTOMATED: CPT | Performed by: INTERNAL MEDICINE

## 2024-10-07 PROCEDURE — 97129 THER IVNTJ 1ST 15 MIN: CPT | Mod: GN

## 2024-10-07 PROCEDURE — 250N000013 HC RX MED GY IP 250 OP 250 PS 637

## 2024-10-07 PROCEDURE — 97130 THER IVNTJ EA ADDL 15 MIN: CPT | Mod: GN

## 2024-10-07 RX ORDER — WARFARIN SODIUM 2.5 MG/1
2.5 TABLET ORAL
Status: COMPLETED | OUTPATIENT
Start: 2024-10-07 | End: 2024-10-07

## 2024-10-07 RX ADMIN — Medication 1 TABLET: at 21:47

## 2024-10-07 RX ADMIN — METHOCARBAMOL 750 MG: 750 TABLET ORAL at 13:33

## 2024-10-07 RX ADMIN — POLYETHYLENE GLYCOL 3350 17 G: 17 POWDER, FOR SOLUTION ORAL at 21:59

## 2024-10-07 RX ADMIN — ACETAMINOPHEN 975 MG: 325 TABLET, FILM COATED ORAL at 06:11

## 2024-10-07 RX ADMIN — EMPAGLIFLOZIN 10 MG: 10 TABLET, FILM COATED ORAL at 08:35

## 2024-10-07 RX ADMIN — PANTOPRAZOLE SODIUM 40 MG: 40 TABLET, DELAYED RELEASE ORAL at 06:12

## 2024-10-07 RX ADMIN — WARFARIN SODIUM 2.5 MG: 2.5 TABLET ORAL at 18:02

## 2024-10-07 RX ADMIN — MAGNESIUM OXIDE TAB 400 MG (241.3 MG ELEMENTAL MG) 400 MG: 400 (241.3 MG) TAB at 08:36

## 2024-10-07 RX ADMIN — METHOCARBAMOL 750 MG: 750 TABLET ORAL at 21:47

## 2024-10-07 RX ADMIN — METHOCARBAMOL 750 MG: 750 TABLET ORAL at 08:35

## 2024-10-07 RX ADMIN — Medication 10 MG: at 21:46

## 2024-10-07 RX ADMIN — LOSARTAN POTASSIUM 50 MG: 50 TABLET, FILM COATED ORAL at 08:36

## 2024-10-07 RX ADMIN — ESCITALOPRAM OXALATE 10 MG: 10 TABLET ORAL at 08:36

## 2024-10-07 RX ADMIN — HYDRALAZINE HYDROCHLORIDE 50 MG: 50 TABLET ORAL at 06:41

## 2024-10-07 RX ADMIN — LOSARTAN POTASSIUM 50 MG: 50 TABLET, FILM COATED ORAL at 21:46

## 2024-10-07 RX ADMIN — ATORVASTATIN CALCIUM 80 MG: 80 TABLET, FILM COATED ORAL at 21:46

## 2024-10-07 RX ADMIN — GABAPENTIN 100 MG: 100 CAPSULE ORAL at 21:45

## 2024-10-07 RX ADMIN — HYDRALAZINE HYDROCHLORIDE 75 MG: 50 TABLET ORAL at 21:46

## 2024-10-07 RX ADMIN — HYDRALAZINE HYDROCHLORIDE 75 MG: 50 TABLET ORAL at 13:33

## 2024-10-07 RX ADMIN — BUMETANIDE 0.5 MG: 0.5 TABLET ORAL at 08:35

## 2024-10-07 RX ADMIN — AMIODARONE HYDROCHLORIDE 200 MG: 200 TABLET ORAL at 08:36

## 2024-10-07 RX ADMIN — ACETAMINOPHEN 975 MG: 325 TABLET, FILM COATED ORAL at 13:32

## 2024-10-07 RX ADMIN — Medication 1 TABLET: at 08:36

## 2024-10-07 RX ADMIN — ACETAMINOPHEN 975 MG: 325 TABLET, FILM COATED ORAL at 21:46

## 2024-10-07 ASSESSMENT — ACTIVITIES OF DAILY LIVING (ADL)
ADLS_ACUITY_SCORE: 39
ADLS_ACUITY_SCORE: 37
ADLS_ACUITY_SCORE: 37
ADLS_ACUITY_SCORE: 39
ADLS_ACUITY_SCORE: 37
ADLS_ACUITY_SCORE: 39
ADLS_ACUITY_SCORE: 37
ADLS_ACUITY_SCORE: 39
ADLS_ACUITY_SCORE: 39
ADLS_ACUITY_SCORE: 37
ADLS_ACUITY_SCORE: 39
ADLS_ACUITY_SCORE: 39
ADLS_ACUITY_SCORE: 37
ADLS_ACUITY_SCORE: 39

## 2024-10-07 NOTE — PROGRESS NOTES
D: On call coordinator paged regarding elevated MAP     I/A: RN states patient has elevated map 100, elevated PI 7-9, a very brief non-sysmptomatic low flow alarm.     P: Advised RN to page provider regarding blood pressure and medication recommendations.  Caregiver notified to page on-call coordinator if symptoms worsen or with other concerns. Caregiver verbalized understanding.

## 2024-10-07 NOTE — PROGRESS NOTES
"  [facility]   Acute Rehabilitation Unit  Daily Progress Note    INTERVAL HISTORY  Notes and labs reviewed over past 24 hours. MAPS elevated to 100-102 overnight and AM hydralazine 50mg was given early. Antihypertensives adjusted per medicine/cardiology. Mr. Aguila is very tired today from the LVAD alarms going off overnight, but he otherwise has no concerns today. He denies dyspnea, lightheadedness, chest pain, upset stomach. He is not experiencing any pain at this time.     ROS: 10 point ROS was assessed and was negative unless otherwise stated in HPI    Functionally,    With PT: 10/6  Current Status:  Bed Mobility: Maria Esther rolling, Sup<>sit SBA w/ bedrail   Transfer: SBA w/ 4WW   Gait: Up to 200' SBA w/ 4WW   Stairs: 6\" x4 w/ joel railings, CGA  Balance: Stable dynamic sitting, SBA for unsupported dynamic standing     With OT: 10/6  Current Status:  ADLs:  Mobility: SBA FWW  Grooming: SBA at sink   Dressing: UB - set up seated. LB - Min A to pull over hips. Feet - TBD.   Bathing: TBD  Toileting: Transfer CGA FWW  IADLs: TBD  Vision/Cognition: Pt scored 17/30 on SLUMs in 10/3/24, would benefit from further cog assessments/functional cognition activities.     With SLP: 10/6  Current Status:  Hearing: Increased volume  Vision: Readers  Communication: Motor speech, language intact  Cognition: Mild cognitive impairment- severe short term memory deficits  Swallow: Easy to chew solids (7)/Thin liquids (0). Pt reported due to lack of dentition, only wants to eat chicken noodle soup and mashed potatoes. Clinical swallow evaluation not indicated at this time, MD can manage diet texture as it is related to dentition status only.     MEDICATIONS  Scheduled meds  Current Facility-Administered Medications   Medication Dose Route Frequency Provider Last Rate Last Admin    acetaminophen (TYLENOL) tablet 975 mg  975 mg Oral Q8H Toussaint Gonzalez, Paola, MD   975 mg at 10/07/24 0611    amiodarone " (PACERONE) tablet 200 mg  200 mg Oral Daily Toussaint Gonzalez, Paola, MD   200 mg at 10/06/24 0857    atorvastatin (LIPITOR) tablet 80 mg  80 mg Oral QPM Toussaint Gonzalez, Paola, MD   80 mg at 10/06/24 2025    bumetanide (BUMEX) tablet 0.5 mg  0.5 mg Oral Daily Toussaint Gonzalez, Paola, MD   0.5 mg at 10/06/24 0959    empagliflozin (JARDIANCE) tablet 10 mg  10 mg Oral Daily Toussaint Gonzalez, Paola, MD   10 mg at 10/06/24 0858    escitalopram (LEXAPRO) tablet 10 mg  10 mg Oral or Feeding Tube Daily Toussaint Gonzalez, Paola, MD   10 mg at 10/06/24 0857    gabapentin (NEURONTIN) capsule 100 mg  100 mg Oral or Feeding Tube At Bedtime Toussaint Gonzalez, Paola, MD   100 mg at 10/06/24 2025    hydrALAZINE (APRESOLINE) tablet 50 mg  50 mg Oral TID Toussaint Gonzalez, Paola, MD   50 mg at 10/07/24 0641    losartan (COZAAR) tablet 50 mg  50 mg Oral BID Toussaint Gonzalez, Paola, MD   50 mg at 10/06/24 2025    magnesium oxide (MAG-OX) tablet 400 mg  400 mg Oral Daily Toussaint Gonzalez, Paola, MD   400 mg at 10/06/24 0858    melatonin tablet 10 mg  10 mg Oral At Bedtime Toussaint Gonzalez, Paola, MD   10 mg at 10/06/24 2025    methocarbamol (ROBAXIN) tablet 750 mg  750 mg Oral TID Toussaint Gonzalez, Paola, MD   750 mg at 10/06/24 2026    multivitamin w/minerals (THERA-VIT-M) tablet 1 tablet  1 tablet Oral BID Toussaint Gonzalez, Paola, MD   1 tablet at 10/06/24 2025    pantoprazole (PROTONIX) EC tablet 40 mg  40 mg Oral QAM AC Toussaint Gonzalez, Paola, MD   40 mg at 10/07/24 0612    Warfarin Dose Required Daily - Pharmacist Managed  1 each Oral See Admin Instructions Toussaint Gonzalez, Paola, MD           PRN meds:  Current Facility-Administered Medications   Medication Dose Route Frequency Provider Last Rate Last Admin    acetaminophen (TYLENOL) tablet 650 mg  650 mg Oral Q4H PRN Toussaint Gonzalez, Paola, MD        bisacodyl (DULCOLAX) suppository 10 mg  10 mg Rectal Daily PRN Toussaint Gonzalez, Paola, MD         "lidocaine (LMX4) cream   Topical Q1H PRN Toussaint Gonzalez, Paola, MD        lidocaine 1 % 0.1-1 mL  0.1-1 mL Other Q1H PRN Toussaint Gonzalez, Paola, MD        naloxone (NARCAN) injection 0.2 mg  0.2 mg Intravenous Q2 Min PRN Toussaint Gonzalez, Paola, MD        Or    naloxone (NARCAN) injection 0.4 mg  0.4 mg Intravenous Q2 Min PRN Toussaint Gonzalez, Paola, MD        Or    naloxone (NARCAN) injection 0.2 mg  0.2 mg Intramuscular Q2 Min PRN Toussaint Gonzalez, Paola, MD        Or    naloxone (NARCAN) injection 0.4 mg  0.4 mg Intramuscular Q2 Min PRN Toussaint Gonzalez, Paola, MD        ondansetron (ZOFRAN ODT) ODT tab 4 mg  4 mg Oral Q6H PRN Toussaint Gonzalez, Paola, MD        oxyCODONE (ROXICODONE) tablet 5 mg  5 mg Oral Q4H PRN Toussaint Gonzalez, Paola, MD        Patient is already receiving anticoagulation with heparin, enoxaparin (LOVENOX), warfarin (COUMADIN)  or other anticoagulant medication   Does not apply Continuous PRN Toussaint Gonzalez, Paola, MD        polyethylene glycol (MIRALAX) Packet 17 g  17 g Oral or Feeding Tube BID PRN Toussaint Gonzalez, Paola, MD        senna-docusate (SENOKOT-S/PERICOLACE) 8.6-50 MG per tablet 2 tablet  2 tablet Oral or Feeding Tube BID PRN Toussaint Gonzalez, Paola, MD        sodium chloride (PF) 0.9% PF flush 3 mL  3 mL Intracatheter q1 min prn Toussaint Gonzalez, Paola, MD         PHYSICAL EXAM  Pulse 77   Temp 97.7  F (36.5  C) (Oral)   Resp 17   Ht 1.676 m (5' 6\")   Wt 64.1 kg (141 lb 5 oz)   SpO2 97%   BMI 22.81 kg/m    General: in no acute distress, conversational and alert, resting comfortably in bed  HEENT: atraumatic, nares clear, conjunctiva white  Pulmonary: on room air, lungs clear to auscultation bilaterally  Cardiovascular: LVAD hum. Well perfused peripherally   Abdominal: soft, non-tender to palpation, non-distended  Extremities: warm peripherally, no edema in BLEs  Skin: warm, dry, without erythema, ecchymosis, or rash noted  MSK: no BLE edema " noted, calves non-tender to palpation  Neuro: conjugate gaze, speech non-dysarthric    LABS  Results for orders placed or performed during the hospital encounter of 10/05/24 (from the past 24 hour(s))   CBC with platelets differential    Narrative    The following orders were created for panel order CBC with platelets differential.  Procedure                               Abnormality         Status                     ---------                               -----------         ------                     CBC with platelets and d...[[id number]]  Abnormal            Final result                 Please view results for these tests on the individual orders.   Basic metabolic panel   Result Value Ref Range    Sodium 132 (L) 135 - 145 mmol/L    Potassium 4.6 3.4 - 5.3 mmol/L    Chloride 98 98 - 107 mmol/L    Carbon Dioxide (CO2) 29 22 - 29 mmol/L    Anion Gap 5 (L) 7 - 15 mmol/L    Urea Nitrogen 15.9 8.0 - 23.0 mg/dL    Creatinine 0.78 0.67 - 1.17 mg/dL    GFR Estimate >90 >60 mL/min/1.73m2    Calcium 8.5 (L) 8.8 - 10.4 mg/dL    Glucose 112 (H) 70 - 99 mg/dL   Phosphorus   Result Value Ref Range    Phosphorus 2.9 2.5 - 4.5 mg/dL   Magnesium   Result Value Ref Range    Magnesium 1.9 1.7 - 2.3 mg/dL   INR   Result Value Ref Range    INR 2.05 (H) 0.85 - 1.15   CBC with platelets and differential   Result Value Ref Range    WBC Count 7.3 4.0 - 11.0 10e3/uL    RBC Count 2.98 (L) 4.40 - 5.90 10e6/uL    Hemoglobin 9.4 (L) 13.3 - 17.7 g/dL    Hematocrit 29.0 (L) 40.0 - 53.0 %    MCV 97 78 - 100 fL    MCH 31.5 26.5 - 33.0 pg    MCHC 32.4 31.5 - 36.5 g/dL    RDW 17.8 (H) 10.0 - 15.0 %    Platelet Count 240 150 - 450 10e3/uL    % Neutrophils 85 %    % Lymphocytes 6 %    % Monocytes 7 %    % Eosinophils 1 %    % Basophils 1 %    % Immature Granulocytes 1 %    NRBCs per 100 WBC 0 <1 /100    Absolute Neutrophils 6.2 1.6 - 8.3 10e3/uL    Absolute Lymphocytes 0.4 (L) 0.8 - 5.3 10e3/uL    Absolute Monocytes 0.5 0.0 - 1.3 10e3/uL    Absolute  Eosinophils 0.1 0.0 - 0.7 10e3/uL    Absolute Basophils 0.1 0.0 - 0.2 10e3/uL    Absolute Immature Granulocytes 0.1 <=0.4 10e3/uL    Absolute NRBCs 0.0 10e3/uL   Glucose by meter   Result Value Ref Range    GLUCOSE BY METER POCT 118 (H) 70 - 99 mg/dL   INR   Result Value Ref Range    INR 2.15 (H) 0.85 - 1.15   CBC with platelets   Result Value Ref Range    WBC Count 5.8 4.0 - 11.0 10e3/uL    RBC Count 2.85 (L) 4.40 - 5.90 10e6/uL    Hemoglobin 8.9 (L) 13.3 - 17.7 g/dL    Hematocrit 27.3 (L) 40.0 - 53.0 %    MCV 96 78 - 100 fL    MCH 31.2 26.5 - 33.0 pg    MCHC 32.6 31.5 - 36.5 g/dL    RDW 17.2 (H) 10.0 - 15.0 %    Platelet Count 211 150 - 450 10e3/uL   Basic metabolic panel   Result Value Ref Range    Sodium 132 (L) 135 - 145 mmol/L    Potassium 4.1 3.4 - 5.3 mmol/L    Chloride 99 98 - 107 mmol/L    Carbon Dioxide (CO2) 26 22 - 29 mmol/L    Anion Gap 7 7 - 15 mmol/L    Urea Nitrogen 16.8 8.0 - 23.0 mg/dL    Creatinine 0.79 0.67 - 1.17 mg/dL    GFR Estimate >90 >60 mL/min/1.73m2    Calcium 8.4 (L) 8.8 - 10.4 mg/dL    Glucose 87 70 - 99 mg/dL   Lactate Dehydrogenase   Result Value Ref Range    Lactate Dehydrogenase 230 0 - 250 U/L       ASSESSMENT AND PLAN  Duane C Johnson is a 74 year old right hand dominant male with a PMH of HFrEF 2/2 ICM with LVEF 20-30%, atrial fibrillation, 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 Due to worsening functional limitations due to heart failure, patient was worked up for VAD while inpatient. Patient is now s/p implantation of HeartMate 3 LVAD on 9/18/2024. His post-operative course was complicated by Afib, pain, anxiety, insomnia, and transient right eye visual deficit. He has also required medical mgmt of his acute blood loss anemia, fluid overload, hyperglycemia, hyponatremia and B pleural effusions. He is now medically stable, admitted to rehab on 10/05/2024 in setting of  impaired strength, impaired balance and impaired activity tolerance.    Updates Today:  -Hypertension: Elevated MAPs overnight in 100-102 range. Hydralazine increased from 50 mg to 75 mg TID per cardiology/medicine teams. Will plan to contact overnight cardsII provider if LVAD alarms are consistently triggered tonight.      Admission to acute inpatient rehab 10/05/2024.    Impairment group code: 09 Cardiac: s/p Heartmate III LVAD placement in setting of acute on chronic systolic heart failure d/t ischemic cardiomyopathy     PT, OT and SLP 60 minutes of each, 6 days a week for 12 days,  in addition to rehab nursing and close management of physiatrist.    Impairment of ADL's: OT for 6 days a week for 12 days, to work on ADL re-training such as grooming, self cares and bathing.   Impairment of mobility:  PT  for 6 days a week for 12 days, to work on neuromuscular re-education focusing on strength, balance, coordination, and endurance.    Impairment of cognition/language/swallow:  SLP for 6 days a week for 12 days, to work on cognitive and linguistic deficits.  Rehab RN for med administration, bowel regimen, glucose monitoring and wound care.       Medical Conditions  # Acute on chronic systolic heart failure secondary to ICM   # s/p HM3 LVAD as DT on 9/18/2024  # CAD s/p PCI  # History of STEMI  # s/p ICD 5/2024  Stage D. NYHA Class III.  Limited TTE 9/27 with LVIDd 4.0 cm, Closed AoV, no AI, moderately reduced RV, normal IVC. Multiple low flow alarms 10/1 received 50 mg hydralazine PO and 500 ml LR.   -Continue 50 mg losartan BID  -Hydralazine 50 mg increased to 75 mg TID  -Continue Empagliflozin 10 mg  -Continue Anticoagulation: warfarin dosing per pharmacy, INR goal 2-3.   Antiplatelet: ASA not indicated in LVAD population, > 6 months s/p STEMI     # Hyperlipidemia  -Continue atrovastatin 80mg      # History of VT/VF arrest 2023  # AFib s/p DCCV 9/2024 and again 9/30/2024  - Successfully cardioverted in early  September for AF. Since then EKG suggested AF on 9/21. The patient was started on hep gtt 9/21, but unclear if was in AF before this. S/p MELBA and DCCV on 9/30 with conversion to sinus rhythm   -continue amiodarone 200 mg daily   -Continue AC as above     # Visual changes, resolved.   Stroke code call 9/29 for visual changes - right eye with decreased upper half of vision and bluish haze. Resolved after 30 minutes. Seen by opthalmology and neuro. Negative head CT and CTA head and neck. He has had a few episodes which last about 5 minutes before resolving completely.   - Already on A/C     # Anemia  - Hgb 8.4 10/4. No overt bleeds, slow downtrend since surgery. LDH downtrending.   - Iron studies suggestive of RADHA. Recieved IV venofer x5 days     #Pain  No pain on admission  -Continue acetaminophen TID< can consider changing to PRN  -Continue gabapentin 100 mg bedtime     Other problems solved:     # Hypervolemic hyponatremia  Na 132, ranging 128-133  -Continue Bumex 0.5 mg daily  - Continue empagliflozin 10 mg daily      # Bilateral pleural effusions  -Chest tube removed 9/30.   -Left thoracentesis done 10/1, 1 L removed, R thoracentesis done 10/3, 1 L removed.   -Continue incentive spirometer     Adjustment to disability:  Clinical psychology to eval and treat TBD. Currently on escitalopram.   FEN: 0-Thin and 6-Soft and bite sized   Bowel: continent  Bladder: continent  DVT Prophylaxis: on warfarin  GI Prophylaxis: pantoprazole. Mg oxide QID for GERD  Code: Full code  Disposition: house with wife  TAINA:  TBD.  Rehab prognosis:  Anticipate w/ intensive PT/OT, skilled rehab nursing cares, and medical mgmt by PMR and HOSP providers, pt will achieve a level of mod IND for bed mobility, transfers, gait, stairs, ADLs, and be able to safely and independently manage his Heartmate III LVAD.   Follow up Appointments on Discharge:   PCP  Ophthalmology  PM&R   Outpatient therapies    Seen and discussed with Dr. Fountain, PM&R  staff physician     --   Roberto Carlos Braswell MD  [facility] PM&R PGY-2  Pager: [phone]

## 2024-10-07 NOTE — PROGRESS NOTES
Discharge Planner Post-Acute Rehab OT:      Discharge Plan: home with wife, HH OT.      Precautions: falls, sternal, cardiac, LVAD, short term memory deficits     Current Status:  ADLs:  Mobility: SBA WWW  Grooming: SBA at sink   Dressing: UB - set up LB - SBA with 4WW. Feet - supervision seated   Bathing: supervision with FB spongebath seated at EOB  Toileting: Transfer CGA 4WW  IADLs: TBD. Supportive spouse who can assist  Vision/Cognition: Pt scored 17/30 on SLUMs in 10/3/24, would benefit from further cog assessments/functional cognition activities.      Assessment:   Completed morning ADL routine and FB spongebath. Pt demonstrating increase IND with ADLs, but presenting with impaired short term memory.    -30 d/t breakfast/nsg cares. Requested later start time going forward for therapy.   Other Barriers to Discharge (DME, Family Training, etc):   Family training prior to discharge.  AE/DME pending progress

## 2024-10-07 NOTE — PLAN OF CARE
Goal Outcome Evaluation:      Plan of Care Reviewed With: patient    Overall Patient Progress: no changeOverall Patient Progress: no change       Vitals/MAP: Vitals stable except for MAP which was 92 then 60 when rechecked later.  Orientation: Alert and oriented but forgetful. Needs cuing.  Pain: Denied pain.  Mobility: SBA with transfers using a walker and gaitbelt.  Diet: Tolerating Easy to Chew/thin liquid diet with good appetite this morning.  Bladder: Continent using the urinal and toilet.  Bowel: Continent of bm. LBM 10/6/24.  Skin/Lines: LVAD driveline site. Sternal incision and CT sites..  Safety: Alarms on at all times.    LVAD numbers within parameters at start of shift. However, PI was low at 2 on recheck. No symptoms when pt assessed. Currently with wife and LVAD coordinator for LVAD education. LVAD coordinator aware of PI. Hospitalist Dr Waddell also made aware of PI of 2. Pt provided with fluids to drink at bedside.

## 2024-10-07 NOTE — PROGRESS NOTES
[facility]    Medicine Progress Note - Hospitalist Service    Date of Admission:  10/5/2024    Assessment & Plan   Duane C Johnson is a 74 year old male pmhx of anterior STEMI s/p PCI to the pLAD on 10/26/23 with a VT/VF arrest the next day (10/27) Placed on amiodarone and discharged 11/03/23, ICD was not indicated as this was within 48h of his MI. His EF prior to discharge was 20-30% with a large area of akinesis in the LAD, placed on apixaban due to this (Apixaban dcd on 7/5/24).   Had multiple admissions for HF exacerbation     Admitted on 8/30/24 for CHF exacerbation with BNP 16k. CPX and RHC showed significant functional limitations due to heart failue. Underwent HM3 LVAD on 9/18/24.   Transferred to ARU on 10/5 for rehab        10/7/24  Spoke with Sirisha in HF service . Discussed last nights events.  Increase Hydralazine to 75 mg TID ( ordered for you )   Extra 25 mg hydralazine ( ordered for you )   CTM  Recommendations given to Primary Team via this note .        # Acute on chronic systolic heart failure secondary to ICM   # s/p HM3 LVAD as DT on 9/18/2024  # CAD s/p PCI  # History of STEMI  # s/p ICD 5/2024  Stage D. NYHA Class III.     Fluid status: Bumex to 0.5 mg daily.   ACEi/ARB/ARNi: 50 mg losartan BID  Vasodilator: Hydralazine 50 mg TID  BB: Recent LVAD, not on any   Aldosterone antagonist deferred while other medical therapy is prioritized  SGLT2i:  Empagliflozin 10 mg  SCD prophylaxis: ICD  MAP: goal 65-85,   LDH trends: 230, stable  Anticoagulation: warfarin dosing per pharmacy, INR goal 2-3,   Antiplatelet: ASA not indicated in LVAD population, > 6 months s/p STEMI  .      # Low flow alarms- Resolved  # HTN  Multiple low flow alarms 10/1, none reported 10/2. Verified that doppler measure is MAP vs. SBP.   - Speed optimization echo performed 10/1, speed decreased to 5100  - Losartan 50 mg BID  - Hydralazine 50 mg TID     # History of VT/VF arrest  2023  # AFib s/p DCCV 9/2024 and again 9/30/2024  - Successfully cardioverted in early September for AF. Since then EKG suggested AF on 9/21. Tele is only available from as early as 9/22. The patient was started on hep gtt 9/21, but unclear if was in AF before this. While the patient was on hep gtt until INR was therapeutic, it is not possible to assess rhythm prior to 9/21. Systemic AC was off on 9/18-9/20. S/p MELBA and DCCV on 9/30 with conversion to sinus rhythm   - continue amiodarone 200 mg daily        # Visual changes, resolved. Stoke code call 9/29 for visual changes - right eye with decreased upper half of vision and bluish haze. Resolved after 30 minutes. Seen by opthalmology and neuro. Negative head CT and CTA head and neck. He has had 3 episodes which last about 5 minutes before resolving completely.   - Ophthalmology and Neuro, signed off  - Already on A/C  - Continue to monitor for changes     # Hypervolemic hyponatremia  Na 132, ranging 128-133  - encourage PO intake  - Bumex 0.5 mg daily, empagliflozin 10 mg daily      # Anemia  - Hgb 8.4 10/4. No overt bleeds, slow downtrend since surgery. LDH downtrending.   - Iron studies suggestive of RADHA. Started on IV venofer x5 days     # Bilateral pleural effusions  - Chest tube removed 9/30.   - Left thoracentesis done 10/1, 1 L removed, R thoracentesis done 10/3, 1 L removed.   CTM                    Diet: Snacks/Supplements Adult: Ensure Enlive; With Meals  Snacks/Supplements Adult: Magic Cup; With Meals  Room Service  Combination Diet Regular Diet; Easy to Chew (level 7); Thin Liquids (level 0)    DVT Prophylaxis: Warfarin  Michael Catheter: Not present  Lines: None     Cardiac Monitoring: None  Code Status: Full Code      Clinically Significant Risk Factors         # Hyponatremia: Lowest Na = 132 mmol/L in last 2 days, will monitor as appropriate           # End stage heart failure: Ventricular assist device (VAD) present              #  Financial/Environmental Concerns:     # ICD device present       Disposition Plan     Medically Ready for Discharge: Anticipated in 5+ Days             Jazmin Waddell MD  Hospitalist Service  [facility]  Securely message with [email or web address] (more info)  Text page via [employer] Paging/Directory   ______________________________________________________________________    Interval History   Had high PI in and LVAD coordinator was notified and hydralazine given earlier   Patient feels well   No cp   No sob   no fever   No chills  No light headness  No weakness in arms  No belly discomfort    Physical Exam   Vital Signs: Temp: 98  F (36.7  C) Temp src: Oral   Pulse: 77   Resp: 16 SpO2: 97 % O2 Device: None (Room air)    Weight: 141 lbs 5.04 oz         Alert and oriented . In no acute distress .  Chest ; Breath sounds are equal BL. No respiratory distress noted .  Cardiovascular Exam ; LVAD  GI ; Abdomen is soft and non tender. BS positive .  Skin ; no jaundice noted.  Psych ; Ankit mood & affect.    Medical Decision Making       55 MINUTES SPENT BY ME on the date of service doing chart review, history, exam, documentation & further activities per the note.      Data     I have personally reviewed the following data over the past 24 hrs:    5.8  \   8.9 (L)   / 211     132 (L) 99 16.8 /  87   4.1 26 0.79 \     INR:  2.15 (H) PTT:  N/A   D-dimer:  N/A Fibrinogen:  N/A     Ferritin:  N/A % Retic:  N/A LDH:  230

## 2024-10-07 NOTE — PROGRESS NOTES
ANTICOAGULATION  MANAGEMENT: Discharge Review    Duane C Johnson chart reviewed for anticoagulation continuity of care    Hospital Admission on 8/30-10/5/24 for acute on chronic systolic CHF.   s/p HM3 LVAD implant 9/18/24   Hx afib s/p DCCV 9/5/24 and 9/30/24   Anticoagulation: lifelong warfarin therapy for LVAD, goal INR 2-3    Discharge disposition:   ARU for cardiac rehab.    Results:    Recent labs: (last 7 days)     10/01/24  0427 10/02/24  0500 10/03/24  0413 10/04/24  0447 10/05/24  0527 10/05/24  1421 10/06/24  0836 10/07/24  0602   INR 3.14* 3.34* 2.77* 2.55* 2.34* 2.12* 2.05* 2.15*     Anticoagulation inpatient management:     9/21 3mg  9/22 3mg  9/23 5mg  9/24 3mg  9/25 0.5mg  9/26 2.5mg  9/27 3mg  9/28 2.5mg  9/29 1mg  9/30 2mg  10/1 1.5mg  10/2 1mg  10/3 1.5mg  10/4 1.5mg  10/5 2mg    anticoagulation calendar updated with inpatient dosing    Anticoagulation discharge instructions:     Warfarin dosing:  ARU to manage warfarin during admission   Bridging: No   INR goal change: No      Medication changes affecting anticoagulation: Yes  Significant drug interactions: aspirin, escitalopram, amiodarone     On Amiodarone PTA    Additional factors affecting anticoagulation:    Per nutrition notes: Encourage small, frequent PO attempts with high protein sources  Ensure, magic cup or Core Power drink 42g Pro daily.      PLAN     [facility] to manage upon discharge from ARU. Referral enrollment to be completed at that time.    Anticoagulation Calendar updated    CANDY LOYOLA RN  10/7/2024  Anticoagulation Clinic  [employer] for routing messages: p ANTICOAG LVAD  [facility] patient phone line: [phone]

## 2024-10-07 NOTE — PLAN OF CARE
Discharge Planner Post-Acute Rehab SLP:     Discharge Plan: home with wife. Ongoing SLP TBD    Precautions: LVAD    Current Status:  Hearing: Increased volume  Vision: Readers  Communication: Motor speech, language intact  Cognition: Mild cognitive impairment- severe short term memory deficits  Swallow: Easy to chew solids (7)/Thin liquids (0). Pt reported due to lack of dentition, only wants to eat chicken noodle soup and mashed potatoes. Clinical swallow evaluation not indicated at this time, MD can manage diet texture as it is related to dentition status only.     Assessment:   AM: HAWA subtests- Clock reading, counting money, daily math problems, and reading directions with % independent accuracy. Noted impulsivity throughout tasks, but able to self correct or provide correct answer when prompted by clincian. Pt reported writing things down during math tasks was useful.     PM: Pt participated iin FAVRES task 1, planning an event. Able to identfy important pieces of information for making decision. Noted impulsivity in writing down answers. When prompted by clinician to defend choice, pt would catch error in reasoning and choose another event. Ultimately able to choose appropriate event given set criteria with min verbal cueing from clincian.   Introduced WARM memory stratgies. Pt able to come up with examples for each strategy, and reported that writing things down is his most used strategy. Pt able to recall relevant details relating to previous work and areas of interest during casual conversation.     Other Barriers to Discharge (Family Training, etc): level of assist/supervision with meds, financial, and LVAD mgmt?

## 2024-10-07 NOTE — PLAN OF CARE
Goal Outcome Evaluation:      Plan of Care Reviewed With: patient    Overall Patient Progress: no change    Outcome Evaluation: Pt. alert oriented, calls appropriately. Denies pain SOB and chest pain. Continent of bowel and bladder LBM 10/6/24, uses urinal. On HM 3 LVAD, -102  PI 7.2 9.2 alarm went off for 1-2 seconds, pt. Denies lightheadedness, or unusual symptoms,referred to LVAD coordinator (Gay) stated, to refer on- call for BP management, on call paged, AM Hydralazine  50 mg given  Pt. Remains asymptomatic. Safety measures in place. Call light within reach. Will continue to monitor the pt.

## 2024-10-07 NOTE — PLAN OF CARE
Goal Outcome Evaluation:      Plan of Care Reviewed With: patient    Overall Patient Progress: no change  VS: MAP 84   O2: SpO2 97% and stable on RA. Denies chest pain and SOB.    Output: Voids spontaneously without difficulty to bathroom / using beside urinal at bedside.   Last BM: 10/5, denies abdominal discomfort. BS active / passing flatus.    Activity: Up with A X 1 walker.   Skin: WDL except, chest tube sites  and drive line site.   Pain: Denies pain.   CMS: Intact, Alert and Oriented x 4. Denies numbness and tingling.   Dressing: New dressing applied to drive line site and chest tube sites.   Diet: Easy to chew (level 7), thin liquids.   LDA: No PIV access.    Equipment: IV pole, personal belongings,    Plan:  Continue with plan of care. Call light within reach, pt able to make needs known.    Additional Info: Pt had a bed bath this evening.

## 2024-10-07 NOTE — PLAN OF CARE
"Discharge Plan: home w/ spouse assist    Precautions: fall, LVAD    Current Status:  Bed Mobility: Maria Esther rolling, Sup<>sit SBA w/ bedrail   Transfer: SBA w/ 4WW   Gait: Up to 200' SBA w/ 4WW   Stairs: 6\" x4 w/ joel railings, CGA  Balance: Stable dynamic sitting, SBA for unsupported dynamic standing     Outcome Measures:   5TSTS     10MWT    6MWT    Assessment: Pt cont to progress well with PT still needing one V.c switching to and from battery. Limited by general weakness and fatigue.     Other Barriers to Discharge (DME, Family Training, etc):   3STE w/ railings. 10STI w/ chair lift available    Family training: TBD   "

## 2024-10-07 NOTE — PROGRESS NOTES
"Individualized Overall Plan Of Care (IOPOC)      Rehab diagnosis/Impairment Group Code: 09 cardiac: s/p heartmate iii lvad placement in setting of acute on chronic systolic heart failure d/t ischemic cardiomyopathy  History of left ventricular assist device (lvad) (h)     Expected functional outcome: reach a level of mod I     Clinical Impression Comments: patient with functional decline post LVAD     Mobility: Pt presents below baseline following complex cardiac hx w/ new LVAD on 9/18/24. Goals for Viri w/ walker at discharge, SBA for stairs and community mobility. Spouse previously providing IADL assist. Pulse index drops to 2.3 following amb, but quickly returns to >2.5 following rest. All other LVAD parameters WNL. Pt will benefit from skilled physical therapy to promote improved functional endurance and safety with mobility. ELOS 7 days.    ADL: Per chart \"Duane C Johnson is a 74 year old right hand dominant male with a PMH of HFrEF 2/2 ICM with LVEF 20-30%, atrial fibrillation, 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 Patient is now s/p implantation of HeartMate 3 LVAD on 9/18/2024. His post-operative course was complicated by Afib, pain, anxiety, insomnia, and transient right eye visual deficit. Admitted to rehab on 10/05/2024 in setting of impaired strength, impaired balance and impaired activity tolerance.\" Pt is below baseline for ADLs/IADLs post LVAD, would benefit from skilled OT to return to PLOF. ELOS 7 days    Communication/Cognition/Swallow: SLP: Motor speech, language intact. RBANS form A administered and interpreted. Pt's overalls core in low average range, greatest deficits in delayed recall. Pt reported changes in short term memory over last 5 years, also reported he has had a hard time remembering peoples' names \"for 40 years.\" Skilled SLP services indicated to develop and train pt in " cognitive strategies to promote independence and safety. Pt on easy to chew solids, pt reported due to lack of dentition, only wants to eat chicken noodle soup and mashed potatoes. Clinical swallow evaluation not indicated at this time, MD can manage diet texture as it is related to dentition status only.     Intensity of therapy:   PT 60 minutes, 6x/week, for 12 days   OT 60 minutes, 6 times/week, for 12 days   SLP 60 minutes, 6 times/week, for 12 days       Education anticoagulation  Neuropsychology Testing: No        Medical Prognosis: good     Physician summary statement:  patient with functional decline post LVAD, goal is to reach a level of mod I     Discharge destination: prior home  Discharge rehabilitation needs: home care, RN, PT, OT, and SLP      Estimated length of stay: 14 days       Rehabilitation Physician Stanislaw Fountain DO

## 2024-10-08 ENCOUNTER — APPOINTMENT (OUTPATIENT)
Dept: PHYSICAL THERAPY | Facility: CLINIC | Age: 74
DRG: 949 | End: 2024-10-08
Attending: PHYSICAL MEDICINE & REHABILITATION
Payer: MEDICARE

## 2024-10-08 ENCOUNTER — APPOINTMENT (OUTPATIENT)
Dept: SPEECH THERAPY | Facility: CLINIC | Age: 74
DRG: 949 | End: 2024-10-08
Attending: PHYSICAL MEDICINE & REHABILITATION
Payer: MEDICARE

## 2024-10-08 ENCOUNTER — APPOINTMENT (OUTPATIENT)
Dept: OCCUPATIONAL THERAPY | Facility: CLINIC | Age: 74
DRG: 949 | End: 2024-10-08
Attending: PHYSICAL MEDICINE & REHABILITATION
Payer: MEDICARE

## 2024-10-08 DIAGNOSIS — Z95.811 LVAD (LEFT VENTRICULAR ASSIST DEVICE) PRESENT (H): ICD-10-CM

## 2024-10-08 DIAGNOSIS — I50.22 CHRONIC SYSTOLIC CONGESTIVE HEART FAILURE (H): Primary | ICD-10-CM

## 2024-10-08 DIAGNOSIS — Z79.01 ANTICOAGULATED ON WARFARIN: ICD-10-CM

## 2024-10-08 LAB — INR PPP: 2.1 (ref 0.85–1.15)

## 2024-10-08 PROCEDURE — 250N000013 HC RX MED GY IP 250 OP 250 PS 637

## 2024-10-08 PROCEDURE — 36415 COLL VENOUS BLD VENIPUNCTURE: CPT

## 2024-10-08 PROCEDURE — 97129 THER IVNTJ 1ST 15 MIN: CPT | Mod: GN

## 2024-10-08 PROCEDURE — 97129 THER IVNTJ 1ST 15 MIN: CPT | Mod: GN | Performed by: SPEECH-LANGUAGE PATHOLOGIST

## 2024-10-08 PROCEDURE — 97535 SELF CARE MNGMENT TRAINING: CPT | Mod: GO | Performed by: OCCUPATIONAL THERAPIST

## 2024-10-08 PROCEDURE — 85610 PROTHROMBIN TIME: CPT

## 2024-10-08 PROCEDURE — 97130 THER IVNTJ EA ADDL 15 MIN: CPT | Mod: GO | Performed by: OCCUPATIONAL THERAPIST

## 2024-10-08 PROCEDURE — 97129 THER IVNTJ 1ST 15 MIN: CPT | Mod: GO | Performed by: OCCUPATIONAL THERAPIST

## 2024-10-08 PROCEDURE — 250N000013 HC RX MED GY IP 250 OP 250 PS 637: Performed by: PHYSICAL MEDICINE & REHABILITATION

## 2024-10-08 PROCEDURE — 99232 SBSQ HOSP IP/OBS MODERATE 35: CPT | Performed by: INTERNAL MEDICINE

## 2024-10-08 PROCEDURE — 128N000003 HC R&B REHAB

## 2024-10-08 PROCEDURE — 97530 THERAPEUTIC ACTIVITIES: CPT | Mod: GP | Performed by: REHABILITATION PRACTITIONER

## 2024-10-08 PROCEDURE — 250N000013 HC RX MED GY IP 250 OP 250 PS 637: Performed by: INTERNAL MEDICINE

## 2024-10-08 PROCEDURE — 97130 THER IVNTJ EA ADDL 15 MIN: CPT | Mod: GN | Performed by: SPEECH-LANGUAGE PATHOLOGIST

## 2024-10-08 PROCEDURE — 97130 THER IVNTJ EA ADDL 15 MIN: CPT | Mod: GN

## 2024-10-08 RX ORDER — WARFARIN SODIUM 2.5 MG/1
2.5 TABLET ORAL
Status: COMPLETED | OUTPATIENT
Start: 2024-10-08 | End: 2024-10-08

## 2024-10-08 RX ADMIN — Medication 1 TABLET: at 21:34

## 2024-10-08 RX ADMIN — LOSARTAN POTASSIUM 50 MG: 50 TABLET, FILM COATED ORAL at 21:33

## 2024-10-08 RX ADMIN — HYDRALAZINE HYDROCHLORIDE 75 MG: 50 TABLET ORAL at 13:38

## 2024-10-08 RX ADMIN — AMIODARONE HYDROCHLORIDE 200 MG: 200 TABLET ORAL at 08:42

## 2024-10-08 RX ADMIN — HYDRALAZINE HYDROCHLORIDE 75 MG: 50 TABLET ORAL at 08:42

## 2024-10-08 RX ADMIN — ACETAMINOPHEN 975 MG: 325 TABLET, FILM COATED ORAL at 05:19

## 2024-10-08 RX ADMIN — HYDRALAZINE HYDROCHLORIDE 75 MG: 50 TABLET ORAL at 19:09

## 2024-10-08 RX ADMIN — WARFARIN SODIUM 2.5 MG: 2.5 TABLET ORAL at 19:03

## 2024-10-08 RX ADMIN — ACETAMINOPHEN 975 MG: 325 TABLET, FILM COATED ORAL at 13:38

## 2024-10-08 RX ADMIN — METHOCARBAMOL 750 MG: 750 TABLET ORAL at 08:43

## 2024-10-08 RX ADMIN — MAGNESIUM OXIDE TAB 400 MG (241.3 MG ELEMENTAL MG) 400 MG: 400 (241.3 MG) TAB at 08:43

## 2024-10-08 RX ADMIN — ESCITALOPRAM OXALATE 10 MG: 10 TABLET ORAL at 08:43

## 2024-10-08 RX ADMIN — METHOCARBAMOL 750 MG: 750 TABLET ORAL at 19:08

## 2024-10-08 RX ADMIN — LOSARTAN POTASSIUM 50 MG: 50 TABLET, FILM COATED ORAL at 08:43

## 2024-10-08 RX ADMIN — Medication 10 MG: at 21:34

## 2024-10-08 RX ADMIN — PANTOPRAZOLE SODIUM 40 MG: 40 TABLET, DELAYED RELEASE ORAL at 05:19

## 2024-10-08 RX ADMIN — Medication 1 TABLET: at 08:43

## 2024-10-08 RX ADMIN — METHOCARBAMOL 750 MG: 750 TABLET ORAL at 13:39

## 2024-10-08 RX ADMIN — EMPAGLIFLOZIN 10 MG: 10 TABLET, FILM COATED ORAL at 08:42

## 2024-10-08 RX ADMIN — ATORVASTATIN CALCIUM 80 MG: 80 TABLET, FILM COATED ORAL at 21:33

## 2024-10-08 RX ADMIN — GABAPENTIN 100 MG: 100 CAPSULE ORAL at 21:34

## 2024-10-08 RX ADMIN — ACETAMINOPHEN 975 MG: 325 TABLET, FILM COATED ORAL at 21:34

## 2024-10-08 ASSESSMENT — ACTIVITIES OF DAILY LIVING (ADL)
ADLS_ACUITY_SCORE: 39

## 2024-10-08 NOTE — PLAN OF CARE
Discharge Planner Post-Acute Rehab SLP:     Discharge Plan: home with wife. Ongoing SLP TBD    Precautions: LVAD    Current Status:  Hearing: Increased volume  Vision: Readers  Communication: Motor speech, language intact  Cognition: Mild cognitive impairment- severe short term memory deficits  Swallow: Easy to chew solids (7)/Thin liquids (0). Pt reported due to lack of dentition, only wants to eat chicken noodle soup and mashed potatoes. Clinical swallow evaluation not indicated at this time, MD can manage diet texture as it is related to dentition status only.     Assessment:   Probed pt recall of WARM memory strategies, pt with no independent recall of training from prior session. Visual aide presente to pt, with visual aide pt described use of all 4 memory techniques. Facilitated structured practice of write it down with picture recall, after pt recorded details and visual stim removed, pt answered 8/10 with notes. 2x questions/details missed was recorded on notes but pt demo'd difficulty synthesizing recorded info to answer questions. Pt educated in long term vs short term memory.     Other Barriers to Discharge (Family Training, etc): level of assist/supervision with meds, financial, and LVAD mgmt?

## 2024-10-08 NOTE — DISCHARGE INSTRUCTIONS
Follow up Appointments on Discharge:     PCP   You are scheduled to see Dr. Coles on October 30 2024 at 8:45 am.    Address  [address] [place] [address]   Phone   [phone]     Ophthalmology   You are scheduled to see Dr. Urias on October 22 2024 at 9:15 am.    Address  [address]    9th Floor     [facility] [address]  Phone   [phone]     PM & R  You are scheduled to see Dr. Olvera on December 16 2024 at 2:15 pm.    Address  [address] [place], [place] [address]e [facility] [address]  Phone   [phone]     Cardiology   You are scheduled to see Torrie Forrest PA-C on October 16 2024 at 8:00 am. You have labs at 7:00 am, same building, 1st floor.    Address  [address] [facility] [address]  Phone   [phone]                         Going home with your VAD    You are about to leave the hospital with your new VAD. This time can feel overwhelming. Your VAD Coordinator team is here to do everything we can to make this transition as smooth as possible. Below are contact numbers and information to help ease the process. A VAD Coordinator is available 24/7 should you have any questions. We would be more than happy to assist you.     Please remember, if you have a true emergency, ALWAYS call 911 first. Then call the on-call VAD Coordinator.     To Reach the On-Call VAD Coordinator: Dial the main hospital number at [phone]. Choose option 4 to speak with the . Ask him or her to page the on-call VAD Coordinator. We should get back to you within five minutes.     Symptoms to Report: Please contact the on-call VAD Coordinator to report:  Bleeding   Dizziness/lightheadedness  Changes in your VAD parameters (+/- 2 from baseline)  VAD alarms  Numbness, tingling, or difficulty moving your arms or legs  Changes in speech, swallowing, or mental status  ANY head injury, even if it is just a small bump  Dark, black, tarry, red, or bloody stools  If you vomit and it is bloody, black, or looks like coffee  grounds  Increased drainage, redness, tenderness, or trauma to your driveline site, chest incision, or chest tube sites    Questions about your medications  Questions about your Coumadin or INR  Any other changes or concerns    Dressing Supplies: Your dressing supply company is [employer]. Please call them once you leave the hospital. They can be reached at [phone]. Verify that they have the correct address for delivery, especially if you are staying in the [place] before going home.  You will need to order the dressing change kits, anchors, adhesive remover, and sterile gloves.    Blood Thinners: Your Coumadin (warfarin) will be managed by the [facility]. They can be reached M-F, 8am-4pm. They will communicate with you regarding your blood draws and your Coumadin dose. If you have an INR drawn and you don t hear from the clinic by 2pm please call them at [phone].  Please never allow anyone else to adjust your Coumadin or Aspirin without talking to a VAD Coordinator.     Clinic Appointments: The VAD team will schedule you for all of your follow-up visits. If you have any questions please call the main VAD office at [phone] to speak with our . You can also contact your VAD Coordinator.     VAD Coordinator: Your VAD Coordinator, Franchesca Haddad, can be reached M-F, 8am-4pm, via [employer], by phone at [phone] or by e-mail at [email or web address].    If you have any further questions or concerns about your VAD please refer to the discharge folder you received from your VAD Coordinator and/or call the on-call VAD Coordinator.

## 2024-10-08 NOTE — PROGRESS NOTES
Discharge Planner Post-Acute Rehab OT:      Discharge Plan: home with wife, HH OT.      Precautions: falls, sternal, cardiac, LVAD, short term memory deficits     Current Status:  ADLs:  Mobility: S/SBA WWW  Grooming: IND standing at sink  Dressing: UB - set up LB - supervision with 4WW. Feet - supervision seated   Bathing: supervision with FB spongebath seated at EOB  Toileting: Supervision with 4WW  IADLs: TBD. Supportive spouse who can assist  Vision/Cognition: Pt scored 17/30 on SLUMs in 10/3/24. ACE-III on 10/8/24: 74/100 indicating cognitive deficits     Assessment:   Progressing pt's ADLs with 4WW with pt continuing requiring cues to safely lock brakes prior to sitting on 4WW, and to safely navigate LVAD cords with ambulation. Pt reporting plan to stay on wall power when in the home. Completed ACE-III with pt scoring 74/100 indicating cognitive deficits.     Other Barriers to Discharge (DME, Family Training, etc):   Family training prior to discharge with spouse  AE/DME: Shower chair

## 2024-10-08 NOTE — PLAN OF CARE
FOCUS/GOAL  Medical management, Safety management, and Prevention of secondary complications    ASSESSMENT, INTERVENTIONS AND CONTINUING PLAN FOR GOAL:  LVAD Pt.  LVAD parameters WDL this shift, previous shift they were not.  Dressing changed by family.  MAP 84 in the evening.  Pt alert and oriented, forgetful, uses call light appropriately.  Requested miralax before bed to help with producing bowel movement.  Denied pain.  Transfers assist of one with walker.  On I+O, easy to chew diet.  Takes pills whole with water or apple juice.  MAP 70 at 0500, lvad parameters WDL.  Goal Outcome Evaluation:

## 2024-10-08 NOTE — PROGRESS NOTES
All VAD education is complete except for dressing change sign off.   Both patient and wife verbalized understanding of and/or correctly demonstrated the ability to:   -Switch power sources  -Change controller. They both demonstrate controller change properly and verbalized understanding that patient should only change controller after discussion with VAD Coordinator and in the presence of a trained caregiver whenever possible.   -Identify controller and states function, power sources, and Battery charger function, alarms, and alarms management.   -Perform Self test on controller   -State VAD precautions and warnings (including but not limited to: travel bags within arms reach at all time, using AC power while sleeping, avoid total immersion in water, boating, MRIs, metal detectors, contact sports, vacuuming or activity that might create static electricity).   -Identify an emergency and state the correct action in the event of an emergency.   -Recognize situations that require paging VAD coordinator and demonstrate proper paging technique.   -Patient and wife present for doppler blood pressure education.   -Instructions given on proper application of blood pressure cuff.   -Instructions given on auscultating antecubital pulse using the doppler  -Instructions given on how to pump up the cuff and slowly release to obtain the doppler 'ed pressure.   -Patient and wife able to recite back instructions.  -wife demonstrated proper technique.  -Determine procedures that necessitate prophylactic antibiotics.   -Warfarin is managed by [facility] anticoagulation clinic. Pt understands that (s)he should never stop or change coumadin dose unless directed to do so by either VAD team or [facility] anticoagulation.  -Verbalized understanding of VAD parameters, normal limits, and actions required when outside of range.   -Patient and wife passed written test.  -Patient confirms having working  3-pronged [employer] outlets at home.  -Critical  life-sustaining medical equipment letter faxed to [employer].  -VAD information packet faxed to pt's identified EMS service, primary care provider, and local cardiologist (if applicable).  -Referral sent to [facility] medication monitoring clinic for warfaring management and dosing. Pt will have labs drawn at [facility] as outpatient  -Driveline exit site supplies will be ordered from [employer]  Follow-up appointments scheduled on 11/4/24 with CVTS and on 10/16/24 with Anitha MOSHER

## 2024-10-08 NOTE — PLAN OF CARE
"Discharge Plan: home w/ spouse assist    Precautions: fall, LVAD    Current Status:  Bed Mobility: Maria Esther rolling, Sup<>sit SBA w/ bedrail   Transfer: SBA w/ 4WW   Gait: Up to 200' SBA w/ 4WW   Stairs: 6\" x4 w/ joel railings, CGA  Balance: Stable dynamic sitting, SBA for unsupported dynamic standing     Outcome Measures:   5TSTS     10MWT    6MWT    Assessment: talked with pt wife on phone due to wife feeling pt is unable to return home this Saturday \" he can't do anything yet\" - did explain what pt is able to do - ie SBA for all bed mob, and STS from 19\" and higher surfaces, amb over 200'x 2 with WW. Up and down 4x4  ( 6\") steps with SBA. After educations on pt functional mobility  wife still feeling too early to come home. Ed that all staff with cont to update pt progress for home this Saturday. PT will assist getting 4WW for home.     Other Barriers to Discharge (DME, Family Training, etc):   3STE w/ railings. 10STI w/ chair lift available    Family training: TBD   pt needing 4WW for discharge   "

## 2024-10-08 NOTE — PLAN OF CARE
TCU/ ARU care coordination assessment:    Reason for consult: Complex care coordination due to new LVAD status    Assessment completed with:  Patient     Date Admitted to unit: 10/5/24    BACKGROUND    Admitting Dx: Heartmate 3 LVAD Placement    PCP:   Jose Coles [phone] [address] [place] [address]       Additional Care Team: Franchesca Haddad, LVAD Coordinator    Insurance: [employer]    Living arrangements:      Patient has three story home with basement, first, and second floors. Patient has a bathroom in the basement and first floor. Patient states that he has walk in showers on both floors with a built in seat.     Patient reports that he has stair lifts to access all floors of his home.      Caregiver after discharge (yes or no): Yes    Name/ Relationship:  Melissa, Spouse    Are they trainable for cares?  Yes    Previous Community Services: Patient reports that in the past, after he experienced an MI, he did have a nurse come to his home 1-2 times per week. He is unable to remember what agency this nurse was from.     Previous DME/Supplies: Patient has some grab bars installed in his home, but states that he needs some more installed. Patient's friend is a contractor and he states that those will be placed by him.     CURRENT STATUS:    Functional Status/ Transfers: Patient ambulating with nursing staff with a walker.     Tubes/Lines/Drains: LVAD    Skin/ Wounds: No current skin concerns    Medications that require education or coordinations: Coumadin- patient states that he has some baseline education regarding Coumadin, as his spouse takes this medication. He verbalizes that he is aware that he requires INRs to be obtained and has identified that he can have his INRs obtained at the [facility].     Follow ups:   PCP  Ophthalmology  PM&R   Outpatient therapies    Barriers to discharge: Patient states that he feels as though his tentative discharge date of 10/12/24 feels  "\"too soon\" and \"rushed.\" Patient states that he still feels very weak and his spouse does not believe that he is able to be home independently by that date.     DISCHARGE PLANNING:    Anticipated discharge date: 10/12/24     Discharge Location: Home     Services:  Home PT/OT/RN    DME/ Supplies:  Walker, TBD    Education needs:   Coumadin/LVAD, and patient's spouse will be checked off on LVAD dressing changes on 10/10. Spouse has completed all other LVAD education.     Other notes:    Writer met with patient this afternoon to introduce role of care coordination and discuss discharge planning. Patient stated instantly that he and his spouse both believe that Saturday, 10/12, feels rushed. Patient described his home and feels as though some modifications still need to be worked through, including placement of more grab bars, purchasing more walkers, and problem solving the steps from the garage into the home. Writer encouraged patient to discuss these concerns with therapy. Patient also stating that he would like to drive his vehicles and also described some large machinery that he uses at home. Writer encouraged patient to discuss this with OT. Patient states that his spouse works from home full time. Writer explained home care versus outpatient and the role of care coordination within that. Prior to discharge, LVAD education and Coumadin education needs to be completed. Writer called patient's spouse to discuss all information above. Patient's spouse has a lot of concerns regarding preparation for discharge. She mentioned that the LVAD coordinator asked if she had a caregiver folder, which she does not. She is also concerned about the plan for showering, as she received a shower kit and does not have any instructions or has not been instructed on it. She is concerned about available outlets in her home and where to plug everything in. She states that she has his extra batteries charging currently. She would like a " handicap parking application to be filled out prior to discharge. She also stated that she does not have any grab bars in place and writer encouraged her to bring in pictures of her bathroom to show therapy so they can recommend placement. She states that they did request for the contractor to place some, but she has not heard back as of yet. She would like to discuss patient's dietary recommendations with the Dietician, so writer will place consult. She would also like to speak with someone from therapy and writer will notify IDTPhilip Torres states that she currently does not feel ready to have patient home and is nervous about bringing him home. Writer sent patient's spouse concerns to LVAD Coordinator to follow up as well. Writer will continue to follow for discharge planning.     Tamiko Pantoja RN, BSN, CRRN  ARC Patient Care Supervisor  Acute Rehabilitation Unit  PH: [phone]  Pager: [phone]

## 2024-10-08 NOTE — PLAN OF CARE
Goal Outcome Evaluation:      Plan of Care Reviewed With: patient    Overall Patient Progress: no change    Orientation: AO, forgetful  Bowel: Continent LBM: 10/6  Bladder: Continent  Pain: denied  Ambulation/Transfers: SBA with walker  Diet/Liquids: tolerating diet well; took pills with applesauce  Tubes/Lines/Drains: LVAD heartmate 3  Skin: driveline site; sternal incision and CT sites    MAP 80. LVAD parameters WNL. No acute issues this shift. Uses call light appropriately. Able to make needs known. Continue POC

## 2024-10-08 NOTE — PLAN OF CARE
"Goal Outcome Evaluation:      Plan of Care Reviewed With: patient    Overall Patient Progress: improvingOverall Patient Progress: improving    VS: Pulse 80   Temp 97.7  F (36.5  C) (Oral)   Resp 16   Ht 1.676 m (5' 6\")   Wt 63.3 kg (139 lb 8.8 oz)   SpO2 98%   BMI 22.52 kg/m     O2: SpO2 > 98 and stable on RA. LS clear and equal bilaterally. Denies chest pain and SOB.    Output: Voids spontaneously without difficulty to bathroom / using beside urinal.   Last BM: 10/6, denies abdominal discomfort. BS active / passing flatus.    Activity: Up with A x 1 with a walker.   Skin: WDL except, Sternal incision/ chest tube site scabbing/ LVAD drive line.    Pain: Denied pain during the shift.   CMS: Intact, AOx4. Denies numbness and tingling.   Dressing: LVAD dressing done with family/ LVAD nurse/ chest tube site has dry gauze, scabbing.   Diet: On Easy to chew diet/ thin liquid. Denies nausea/vomiting and had good appetite for meals.   LDA: LVAD drive line.   Equipment: Personal belongings, LVAD emergency bag/ LVAD monitor    Plan: Fall/ sternal precautions maintained / Continue with plan of care. Call light within reach, pt able to make needs known. Bed alarm on.    Additional Info: Pt LVAD dressing done. 7 LVAD daily dressing pack/ 7 size 8 surgical glove packed for the pt, kept in pt's room, wife will take it home for use after discharge. Offered BM, pt refused. Bedtime suppository not done pt had 2 Large BM.           "

## 2024-10-08 NOTE — PROGRESS NOTES
[facility]    Medicine Progress Note - Hospitalist Service    Date of Admission:  10/5/2024    Assessment & Plan   Duane C Johnson is a 74 year old male pmhx of anterior STEMI s/p PCI to the pLAD on 10/26/23 with a VT/VF arrest the next day (10/27) Placed on amiodarone and discharged 11/03/23, ICD was not indicated as this was within 48h of his MI. His EF prior to discharge was 20-30% with a large area of akinesis in the LAD, placed on apixaban due to this (Apixaban dcd on 7/5/24).   Had multiple admissions for HF exacerbation     Admitted on 8/30/24 for CHF exacerbation with BNP 16k. CPX and RHC showed significant functional limitations due to heart failue. Underwent HM3 LVAD on 9/18/24 needed mediastinal re-exploration for post operative hemorrhage.   Transferred to ARU on 10/5 for rehab             # Acute on chronic systolic heart failure secondary to ICM   #HFrEF  s/p HM3 LVAD as DT on 9/18/2024  # CAD s/p PCI  # History of STEMI, CAD status post  PCI to LAD in 2023   # s/p ICD 5/8/2024 with history VT/VF arrest   -Stage D. NYHA Class III.  - gets some shortness of breath  with therapy but not at rest      Fluid status: Bumex to 0.5 mg daily.   ACEi/ARB/ARNi: 50 mg losartan BID  Vasodilator: Hydralazine 50 mg TID increased to 75 mg tid 10/7   Received extra dose of hydralazine 25 mg 10/7    BB: Recent LVAD, not on any   Aldosterone antagonist deferred while other medical therapy is prioritized  SGLT2i:  Empagliflozin 10 mg  SCD prophylaxis: ICD  MAP: goal 65-85,   LDH trends: 230, stable  Anticoagulation: warfarin dosing per pharmacy, INR goal 2-3,   Antiplatelet: ASA not indicated in LVAD population, > 6 months s/p STEMI       # Low flow alarms- Resolved  # HTN  Multiple low flow alarms 10/1, none reported 10/2. Verified that doppler measure is MAP vs. SBP.   - Speed optimization echo performed 10/1, speed decreased to 5100  - Losartan 50 mg BID  - Hydralazine 50  mg TID--> 75 mg TID  10/7   - continue t monitor closely      # History of VT/VF arrest 2023  # AFib s/p DCCV 9/5/2024 and again 9/30/2024  - Successfully cardioverted in early September for AF. Since then EKG suggested AF on 9/21. Tele is only available from as early as 9/22. The patient was started on hep gtt 9/21, but unclear if was in AF before this. While the patient was on hep gtt until INR was therapeutic, it is not possible to assess rhythm prior to 9/21. Systemic AC was off on 9/18-9/20. S/p MEBLA and DCCV on 9/30 with conversion to sinus rhythm   - continue amiodarone 200 mg daily         # Visual changes, resolved.   -Stroke code call 9/29 for visual changes - right eye with decreased upper half of vision and bluish haze. Resolved after 30 minutes. Seen by opthalmology and neuro. Negative head CT and CTA head and neck. He has had 3 episodes which last about 5 minutes before resolving completely.   - Ophthalmology and Neuro, signed off  - Already on A/C  - Continue to monitor for changes     # Hypervolemic hyponatremia  Na 132, ranging 128-133  - encourage PO intake  - Bumex 0.5 mg daily now on hold , last received 10/5  , discussed with   cardiology and restart if start gaining weight or signs of  volume increase   , empagliflozin 10 mg daily      # Anemia  - Hg  stable in 8 range   - Iron studies suggestive of RADHA. Received IV venofer x5 days last day 10/2     # Bilateral pleural effusions  - Chest tube removed 9/30.   - Left thoracentesis done 10/1, 1 L removed, R thoracentesis done 10/3, 1 L removed.   CTM    #History localized prostate cancer, Sarah 4+7=7   - was not felt to be surgical candidate die to his heart disease   - received  Lupron q 3 months seems  received last dose this  year , radiation therapy             Diet: Snacks/Supplements Adult: Ensure Enlive; With Meals  Snacks/Supplements Adult: Magic Cup; With Meals  Room Service  Combination Diet Regular Diet; Easy to Chew (level 7); Thin  Liquids (level 0)    DVT Prophylaxis: Warfarin  Michael Catheter: Not present  Lines: None     Cardiac Monitoring: None  Code Status: Full Code      Clinically Significant Risk Factors         # Hyponatremia: Lowest Na = 132 mmol/L in last 2 days, will monitor as appropriate           # End stage heart failure: Ventricular assist device (VAD) present              # Financial/Environmental Concerns:     # ICD device present          Jenniffer Rowland MD  Hospitalist Service  [facility]  Securely message with Dizmo (more info)  Text page via Mobile Embrace Paging/Directory   ______________________________________________________________________    Interval History   Doing ok, in bed, does get some shortness of breath  with therapy but not whiel in bed     Physical Exam   Vital Signs: Temp: 97.7  F (36.5  C) Temp src: Oral   Pulse: 80   Resp: 16 SpO2: 98 % O2 Device: None (Room air)    Weight: 139 lbs 8.82 oz  General appearence: awake alert  in  no apparent distress    HEENT: EOMI, PEARLA, sclera nonicteric,  dry mucus membranes,   NECK : supple  RESPIRATORY: lungs clear  , LVAD sounds on left    CARDIOVASCULAR:S1 S2  LVAD sounds   GASTROINTESTINAL:soft, non-distended , non-tender , + bowel sounds, also LVAD sounds     SKIN: warm and dry, no mottling noted   NEUROLOGIC; awake alert and oriented,   EXTREMITIES: no clubbing, cyanosis or edema ,      Data     I have personally reviewed the following data over the past 24 hrs:    INR:  2.10 (H) PTT:  N/A   D-dimer:  N/A Fibrinogen:  N/A       Imaging results reviewed over the past 24 hrs:   No results found for this or any previous visit (from the past 24 hour(s)).

## 2024-10-08 NOTE — PROGRESS NOTES
"  [facility]   Acute Rehabilitation Unit  Daily Progress Note    INTERVAL HISTORY  Notes and labs reviewed over past 24 hours. No acute events overnight.  Duane reports no acute concerns today.  He reports that he slept well overnight without any LVAD alarms going off, and has been continent of bowel/bladder.  His last bowel movement was yesterday.  He describes some shortness of breath with activity during physical therapy, but this is relieved upon resting.  He denies any chest pain, headaches, gastrointestinal distress. He is not experiencing any current pain.     ROS: 10 point ROS was assessed and was negative unless otherwise stated in HPI    Functionally,    With PT: 10/7  Current Status:  Bed Mobility: Maria Esther rolling, Sup<>sit SBA w/ bedrail   Transfer: SBA w/ 4WW   Gait: Up to 200' SBA w/ 4WW   Stairs: 6\" x4 w/ joel railings, CGA  Balance: Stable dynamic sitting, SBA for unsupported dynamic standing     With OT: 10/7  Current Status:  ADLs:  Mobility: SBA WWW  Grooming: SBA at sink   Dressing: UB - set up LB - SBA with 4WW. Feet - supervision seated   Bathing: supervision with FB spongebath seated at EOB  Toileting: Transfer CGA 4WW  IADLs: TBD. Supportive spouse who can assist  Vision/Cognition: Pt scored 17/30 on SLUMs in 10/3/24, would benefit from further cog assessments/functional cognition activities.     With SLP: 10/7  Current Status:  Hearing: Increased volume  Vision: Readers  Communication: Motor speech, language intact  Cognition: Mild cognitive impairment- severe short term memory deficits  Swallow: Easy to chew solids (7)/Thin liquids (0). Pt reported due to lack of dentition, only wants to eat chicken noodle soup and mashed potatoes. Clinical swallow evaluation not indicated at this time, MD can manage diet texture as it is related to dentition status only.     MEDICATIONS  Scheduled meds  Current Facility-Administered Medications   Medication Dose Route " Frequency Provider Last Rate Last Admin    acetaminophen (TYLENOL) tablet 975 mg  975 mg Oral Q8H Toussaint Gonzalez, Paola, MD   975 mg at 10/08/24 0519    amiodarone (PACERONE) tablet 200 mg  200 mg Oral Daily Toussaint Gonzalez, Paola, MD   200 mg at 10/07/24 0836    atorvastatin (LIPITOR) tablet 80 mg  80 mg Oral QPM Toussaint Gonzalez, Paola, MD   80 mg at 10/07/24 2146    [Held by provider] bumetanide (BUMEX) tablet 0.5 mg  0.5 mg Oral Daily Toussaint Gonzalez, Paola, MD   0.5 mg at 10/07/24 0835    empagliflozin (JARDIANCE) tablet 10 mg  10 mg Oral Daily Toussaint Gonzalez, Paola, MD   10 mg at 10/07/24 0835    escitalopram (LEXAPRO) tablet 10 mg  10 mg Oral or Feeding Tube Daily Toussaint Gonzalez, Paola, MD   10 mg at 10/07/24 0836    gabapentin (NEURONTIN) capsule 100 mg  100 mg Oral or Feeding Tube At Bedtime Toussaint Gonzalez, Paola, MD   100 mg at 10/07/24 2145    hydrALAZINE (APRESOLINE) half-tab 75 mg  75 mg Oral TID Jazmin Waddell MD   75 mg at 10/07/24 2146    losartan (COZAAR) tablet 50 mg  50 mg Oral BID Toussaint Gonzalez, Paola, MD   50 mg at 10/07/24 2146    magnesium oxide (MAG-OX) tablet 400 mg  400 mg Oral Daily Toussaint Gonzalez, Paola, MD   400 mg at 10/07/24 0836    melatonin tablet 10 mg  10 mg Oral At Bedtime Toussaint Gonzalez, Paola, MD   10 mg at 10/07/24 2146    methocarbamol (ROBAXIN) tablet 750 mg  750 mg Oral TID Toussaint Gonzalez, Paola, MD   750 mg at 10/07/24 2147    multivitamin w/minerals (THERA-VIT-M) tablet 1 tablet  1 tablet Oral BID Toussaint Gonzalez, Paola, MD   1 tablet at 10/07/24 2147    pantoprazole (PROTONIX) EC tablet 40 mg  40 mg Oral QAM AC Toussaint Gonzalez, Paola, MD   40 mg at 10/08/24 0519    Warfarin Dose Required Daily - Pharmacist Managed  1 each Oral See Admin Instructions Toussaint Gonzalez, Paola, MD           PRN meds:  Current Facility-Administered Medications   Medication Dose Route Frequency Provider Last Rate Last Admin    acetaminophen  "(TYLENOL) tablet 650 mg  650 mg Oral Q4H PRN Toussaint Gonzalez, Paola, MD        bisacodyl (DULCOLAX) suppository 10 mg  10 mg Rectal Daily PRN Toussaint Gonzalez, Paola, MD        lidocaine (LMX4) cream   Topical Q1H PRN Toussaint Gonzalez, Paola, MD        lidocaine 1 % 0.1-1 mL  0.1-1 mL Other Q1H PRN Toussaint Gonzalez, Paola, MD        naloxone (NARCAN) injection 0.2 mg  0.2 mg Intravenous Q2 Min PRN Toussaint Gonzalez, Paola, MD        Or    naloxone (NARCAN) injection 0.4 mg  0.4 mg Intravenous Q2 Min PRN Toussaint Gonzalez, Paola, MD        Or    naloxone (NARCAN) injection 0.2 mg  0.2 mg Intramuscular Q2 Min PRN Toussaint Gonzalez, Paola, MD        Or    naloxone (NARCAN) injection 0.4 mg  0.4 mg Intramuscular Q2 Min PRN Toussaint Gonzalez, Paola, MD        ondansetron (ZOFRAN ODT) ODT tab 4 mg  4 mg Oral Q6H PRN Toussaint Gonzalez, Paola, MD        oxyCODONE (ROXICODONE) tablet 5 mg  5 mg Oral Q4H PRN Toussaint Gonzalez, Paola, MD        Patient is already receiving anticoagulation with heparin, enoxaparin (LOVENOX), warfarin (COUMADIN)  or other anticoagulant medication   Does not apply Continuous PRN Toussaint Gonzalez, Paola, MD        polyethylene glycol (MIRALAX) Packet 17 g  17 g Oral or Feeding Tube BID PRN Toussaint Gonzalez, Paola, MD   17 g at 10/07/24 3219    senna-docusate (SENOKOT-S/PERICOLACE) 8.6-50 MG per tablet 2 tablet  2 tablet Oral or Feeding Tube BID PRN Toussaint Gonzalez, Paola, MD        sodium chloride (PF) 0.9% PF flush 3 mL  3 mL Intracatheter q1 min prn Toussaint Gonzalez, Paola, MD         PHYSICAL EXAM  Pulse 80   Temp 97.7  F (36.5  C) (Oral)   Resp 16   Ht 1.676 m (5' 6\")   Wt 63.3 kg (139 lb 8.8 oz)   SpO2 98%   BMI 22.52 kg/m    General: in no acute distress, conversational and alert, resting comfortably in bed  HEENT: atraumatic, nares clear, conjunctiva white  Pulmonary: on room air, lungs clear to auscultation bilaterally  Cardiovascular: LVAD hum. Well perfused " peripherally   Abdominal: soft, non-tender to palpation, non-distended  Extremities: warm peripherally, no edema in BLEs  Skin: warm, dry, without erythema, ecchymosis, or rash noted  MSK: no BLE edema noted, calves non-tender to palpation  Neuro: conjugate gaze, speech non-dysarthric    LABS  Results for orders placed or performed during the hospital encounter of 10/05/24 (from the past 24 hour(s))   INR   Result Value Ref Range    INR 2.10 (H) 0.85 - 1.15       ASSESSMENT AND PLAN  Duane C Johnson is a 74 year old right hand dominant male with a PMH of HFrEF 2/2 ICM with LVEF 20-30%, atrial fibrillation, 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 Due to worsening functional limitations due to heart failure, patient was worked up for VAD while inpatient. Patient is now s/p implantation of HeartMate 3 LVAD on 9/18/2024. His post-operative course was complicated by Afib, pain, anxiety, insomnia, and transient right eye visual deficit. He has also required medical mgmt of his acute blood loss anemia, fluid overload, hyperglycemia, hyponatremia and B pleural effusions. He is now medically stable, admitted to rehab on 10/05/2024 in setting of impaired strength, impaired balance and impaired activity tolerance.    Updates Today:  - No changes to plan of care      Admission to acute inpatient rehab 10/05/2024.    Impairment group code: 09 Cardiac: s/p Heartmate III LVAD placement in setting of acute on chronic systolic heart failure d/t ischemic cardiomyopathy     PT, OT and SLP 60 minutes of each, 6 days a week for 12 days,  in addition to rehab nursing and close management of physiatrist.    Impairment of ADL's: OT for 6 days a week for 12 days, to work on ADL re-training such as grooming, self cares and bathing.   Impairment of mobility:  PT  for 6 days a week for 12 days, to work on neuromuscular re-education  focusing on strength, balance, coordination, and endurance.    Impairment of cognition/language/swallow:  SLP for 6 days a week for 12 days, to work on cognitive and linguistic deficits.  Rehab RN for med administration, bowel regimen, glucose monitoring and wound care.       Medical Conditions  # Acute on chronic systolic heart failure secondary to ICM   #HFrEF  s/p HM3 LVAD as DT on 9/18/2024  # CAD s/p PCI  # History of STEMI, CAD status post  PCI to LAD in 2023   # s/p ICD 5/8/2024 with history VT/VF arrest   Stage D. NYHA Class III.  Limited TTE 9/27 with LVIDd 4.0 cm, Closed AoV, no AI, moderately reduced RV, normal IVC. Multiple low flow alarms 10/1 received 50 mg hydralazine PO and 500 ml LR.   -Continue 50 mg losartan BID  -Hydralazine 50 mg increased to 75 mg TID  -Continue Empagliflozin 10 mg  -Continue Anticoagulation: warfarin dosing per pharmacy, INR goal 2-3.   Antiplatelet: ASA not indicated in LVAD population, > 6 months s/p STEMI     # Hyperlipidemia  -Continue atrovastatin 80mg      # History of VT/VF arrest 2023  # AFib s/p DCCV 9/2024 and again 9/30/2024  - Successfully cardioverted in early September for AF. Since then EKG suggested AF on 9/21. The patient was started on hep gtt 9/21, but unclear if was in AF before this. S/p MELBA and DCCV on 9/30 with conversion to sinus rhythm   -continue amiodarone 200 mg daily   -Continue AC as above     # Visual changes, resolved.   Stroke code call 9/29 for visual changes - right eye with decreased upper half of vision and bluish haze. Resolved after 30 minutes. Seen by opthalmology and neuro. Negative head CT and CTA head and neck. He has had a few episodes which last about 5 minutes before resolving completely.   - Already on A/C     # Anemia  - Hgb 8.4 10/4. No overt bleeds, slow downtrend since surgery. LDH downtrending.   - Iron studies suggestive of RADHA. Recieved IV venofer x5 days     #Pain  No pain on admission  -Continue acetaminophen TID< can  consider changing to PRN  -Continue gabapentin 100 mg bedtime     Other problems solved:     # Hypervolemic hyponatremia  Na 132, ranging 128-133  -Continue Bumex 0.5 mg daily  - Continue empagliflozin 10 mg daily      # Bilateral pleural effusions  -Chest tube removed 9/30.   -Left thoracentesis done 10/1, 1 L removed, R thoracentesis done 10/3, 1 L removed.   -Continue incentive spirometer     Adjustment to disability:  Clinical psychology to eval and treat TBD. Currently on escitalopram.   FEN: 0-Thin and 6-Soft and bite sized   Bowel: continent  Bladder: continent  DVT Prophylaxis: on warfarin  GI Prophylaxis: pantoprazole. Mg oxide QID for GERD  Code: Full code  Disposition: house with wife  ELOS:  TBD.  Rehab prognosis:  Anticipate w/ intensive PT/OT, skilled rehab nursing cares, and medical mgmt by PMR and HOSP providers, pt will achieve a level of mod IND for bed mobility, transfers, gait, stairs, ADLs, and be able to safely and independently manage his Heartmate III LVAD.   Follow up Appointments on Discharge:   PCP  Ophthalmology  PM&R   Outpatient therapies    Seen and discussed with Dr. Fountain, PM&R staff physician     --   Roberto Carlos Braswell MD  [facility] PM&R PGY-2  Pager: [phone]

## 2024-10-09 ENCOUNTER — CARE COORDINATION (OUTPATIENT)
Dept: CARDIOLOGY | Facility: CLINIC | Age: 74
End: 2024-10-09

## 2024-10-09 ENCOUNTER — APPOINTMENT (OUTPATIENT)
Dept: SPEECH THERAPY | Facility: CLINIC | Age: 74
DRG: 949 | End: 2024-10-09
Attending: PHYSICAL MEDICINE & REHABILITATION
Payer: MEDICARE

## 2024-10-09 ENCOUNTER — APPOINTMENT (OUTPATIENT)
Dept: PHYSICAL THERAPY | Facility: CLINIC | Age: 74
DRG: 949 | End: 2024-10-09
Attending: PHYSICAL MEDICINE & REHABILITATION
Payer: MEDICARE

## 2024-10-09 ENCOUNTER — APPOINTMENT (OUTPATIENT)
Dept: OCCUPATIONAL THERAPY | Facility: CLINIC | Age: 74
DRG: 949 | End: 2024-10-09
Attending: PHYSICAL MEDICINE & REHABILITATION
Payer: MEDICARE

## 2024-10-09 LAB
ALBUMIN SERPL BCG-MCNC: 2.6 G/DL (ref 3.5–5.2)
ALP SERPL-CCNC: 113 U/L (ref 40–150)
ALT SERPL W P-5'-P-CCNC: 31 U/L (ref 0–70)
ANION GAP SERPL CALCULATED.3IONS-SCNC: 9 MMOL/L (ref 7–15)
AST SERPL W P-5'-P-CCNC: 32 U/L (ref 0–45)
BASOPHILS # BLD AUTO: 0 10E3/UL (ref 0–0.2)
BASOPHILS NFR BLD AUTO: 1 %
BILIRUB SERPL-MCNC: 0.8 MG/DL
BUN SERPL-MCNC: 15.7 MG/DL (ref 8–23)
CALCIUM SERPL-MCNC: 8.3 MG/DL (ref 8.8–10.4)
CHLORIDE SERPL-SCNC: 98 MMOL/L (ref 98–107)
CREAT SERPL-MCNC: 0.82 MG/DL (ref 0.67–1.17)
EGFRCR SERPLBLD CKD-EPI 2021: >90 ML/MIN/1.73M2
EOSINOPHIL # BLD AUTO: 0 10E3/UL (ref 0–0.7)
EOSINOPHIL NFR BLD AUTO: 1 %
ERYTHROCYTE [DISTWIDTH] IN BLOOD BY AUTOMATED COUNT: 17.4 % (ref 10–15)
GLUCOSE BLDC GLUCOMTR-MCNC: 95 MG/DL (ref 70–99)
GLUCOSE SERPL-MCNC: 140 MG/DL (ref 70–99)
HCO3 SERPL-SCNC: 24 MMOL/L (ref 22–29)
HCT VFR BLD AUTO: 29.4 % (ref 40–53)
HGB BLD-MCNC: 9.5 G/DL (ref 13.3–17.7)
IMM GRANULOCYTES # BLD: 0 10E3/UL
IMM GRANULOCYTES NFR BLD: 0 %
INR PPP: 2.2 (ref 0.85–1.15)
LYMPHOCYTES # BLD AUTO: 0.4 10E3/UL (ref 0.8–5.3)
LYMPHOCYTES NFR BLD AUTO: 6 %
MCH RBC QN AUTO: 31.4 PG (ref 26.5–33)
MCHC RBC AUTO-ENTMCNC: 32.3 G/DL (ref 31.5–36.5)
MCV RBC AUTO: 97 FL (ref 78–100)
MONOCYTES # BLD AUTO: 0.5 10E3/UL (ref 0–1.3)
MONOCYTES NFR BLD AUTO: 8 %
NEUTROPHILS # BLD AUTO: 5.6 10E3/UL (ref 1.6–8.3)
NEUTROPHILS NFR BLD AUTO: 84 %
NRBC # BLD AUTO: 0 10E3/UL
NRBC BLD AUTO-RTO: 0 /100
PLATELET # BLD AUTO: 209 10E3/UL (ref 150–450)
POTASSIUM SERPL-SCNC: 4.4 MMOL/L (ref 3.4–5.3)
PROT SERPL-MCNC: 5.9 G/DL (ref 6.4–8.3)
RBC # BLD AUTO: 3.03 10E6/UL (ref 4.4–5.9)
SODIUM SERPL-SCNC: 131 MMOL/L (ref 135–145)
WBC # BLD AUTO: 6.6 10E3/UL (ref 4–11)

## 2024-10-09 PROCEDURE — 250N000013 HC RX MED GY IP 250 OP 250 PS 637: Performed by: PHYSICAL MEDICINE & REHABILITATION

## 2024-10-09 PROCEDURE — 36415 COLL VENOUS BLD VENIPUNCTURE: CPT | Performed by: INTERNAL MEDICINE

## 2024-10-09 PROCEDURE — 97130 THER IVNTJ EA ADDL 15 MIN: CPT | Mod: GN | Performed by: SPEECH-LANGUAGE PATHOLOGIST

## 2024-10-09 PROCEDURE — 250N000013 HC RX MED GY IP 250 OP 250 PS 637: Performed by: INTERNAL MEDICINE

## 2024-10-09 PROCEDURE — 85610 PROTHROMBIN TIME: CPT

## 2024-10-09 PROCEDURE — 36415 COLL VENOUS BLD VENIPUNCTURE: CPT

## 2024-10-09 PROCEDURE — 999N000040 HC STATISTIC CONSULT NO CHARGE VASC ACCESS

## 2024-10-09 PROCEDURE — 93005 ELECTROCARDIOGRAM TRACING: CPT

## 2024-10-09 PROCEDURE — 99232 SBSQ HOSP IP/OBS MODERATE 35: CPT | Mod: GC

## 2024-10-09 PROCEDURE — 99207 PR NO CHARGE LOS: CPT | Performed by: NURSE PRACTITIONER

## 2024-10-09 PROCEDURE — 82247 BILIRUBIN TOTAL: CPT | Performed by: INTERNAL MEDICINE

## 2024-10-09 PROCEDURE — 97530 THERAPEUTIC ACTIVITIES: CPT | Mod: GP

## 2024-10-09 PROCEDURE — 99232 SBSQ HOSP IP/OBS MODERATE 35: CPT | Performed by: INTERNAL MEDICINE

## 2024-10-09 PROCEDURE — 97110 THERAPEUTIC EXERCISES: CPT | Mod: GP

## 2024-10-09 PROCEDURE — 128N000003 HC R&B REHAB

## 2024-10-09 PROCEDURE — 258N000003 HC RX IP 258 OP 636: Performed by: PHYSICAL MEDICINE & REHABILITATION

## 2024-10-09 PROCEDURE — 93010 ELECTROCARDIOGRAM REPORT: CPT | Performed by: INTERNAL MEDICINE

## 2024-10-09 PROCEDURE — 97535 SELF CARE MNGMENT TRAINING: CPT | Mod: GO | Performed by: OCCUPATIONAL THERAPIST

## 2024-10-09 PROCEDURE — 250N000013 HC RX MED GY IP 250 OP 250 PS 637

## 2024-10-09 PROCEDURE — 97129 THER IVNTJ 1ST 15 MIN: CPT | Mod: GN | Performed by: SPEECH-LANGUAGE PATHOLOGIST

## 2024-10-09 PROCEDURE — 85025 COMPLETE CBC W/AUTO DIFF WBC: CPT | Performed by: INTERNAL MEDICINE

## 2024-10-09 PROCEDURE — 999N000127 HC STATISTIC PERIPHERAL IV START W US GUIDANCE

## 2024-10-09 RX ORDER — WARFARIN SODIUM 2.5 MG/1
2.5 TABLET ORAL
Status: COMPLETED | OUTPATIENT
Start: 2024-10-09 | End: 2024-10-09

## 2024-10-09 RX ORDER — SODIUM CHLORIDE, SODIUM LACTATE, POTASSIUM CHLORIDE, CALCIUM CHLORIDE 600; 310; 30; 20 MG/100ML; MG/100ML; MG/100ML; MG/100ML
INJECTION, SOLUTION INTRAVENOUS
Status: COMPLETED
Start: 2024-10-09 | End: 2024-10-09

## 2024-10-09 RX ADMIN — LOSARTAN POTASSIUM 50 MG: 50 TABLET, FILM COATED ORAL at 09:42

## 2024-10-09 RX ADMIN — GABAPENTIN 100 MG: 100 CAPSULE ORAL at 21:11

## 2024-10-09 RX ADMIN — METHOCARBAMOL 750 MG: 750 TABLET ORAL at 21:11

## 2024-10-09 RX ADMIN — EMPAGLIFLOZIN 10 MG: 10 TABLET, FILM COATED ORAL at 09:41

## 2024-10-09 RX ADMIN — HYDRALAZINE HYDROCHLORIDE 75 MG: 50 TABLET ORAL at 09:42

## 2024-10-09 RX ADMIN — ACETAMINOPHEN 975 MG: 325 TABLET, FILM COATED ORAL at 06:33

## 2024-10-09 RX ADMIN — PANTOPRAZOLE SODIUM 40 MG: 40 TABLET, DELAYED RELEASE ORAL at 09:41

## 2024-10-09 RX ADMIN — LOSARTAN POTASSIUM 50 MG: 50 TABLET, FILM COATED ORAL at 21:11

## 2024-10-09 RX ADMIN — MAGNESIUM OXIDE TAB 400 MG (241.3 MG ELEMENTAL MG) 400 MG: 400 (241.3 MG) TAB at 09:42

## 2024-10-09 RX ADMIN — METHOCARBAMOL 750 MG: 750 TABLET ORAL at 09:41

## 2024-10-09 RX ADMIN — SODIUM CHLORIDE, POTASSIUM CHLORIDE, SODIUM LACTATE AND CALCIUM CHLORIDE 500 ML: 600; 310; 30; 20 INJECTION, SOLUTION INTRAVENOUS at 19:28

## 2024-10-09 RX ADMIN — Medication 1 TABLET: at 21:11

## 2024-10-09 RX ADMIN — AMIODARONE HYDROCHLORIDE 200 MG: 200 TABLET ORAL at 09:41

## 2024-10-09 RX ADMIN — Medication 10 MG: at 21:11

## 2024-10-09 RX ADMIN — Medication 1 TABLET: at 09:41

## 2024-10-09 RX ADMIN — ACETAMINOPHEN 975 MG: 325 TABLET, FILM COATED ORAL at 21:10

## 2024-10-09 RX ADMIN — ATORVASTATIN CALCIUM 80 MG: 80 TABLET, FILM COATED ORAL at 21:11

## 2024-10-09 RX ADMIN — WARFARIN SODIUM 2.5 MG: 2.5 TABLET ORAL at 19:17

## 2024-10-09 RX ADMIN — ESCITALOPRAM OXALATE 10 MG: 10 TABLET ORAL at 09:42

## 2024-10-09 ASSESSMENT — ACTIVITIES OF DAILY LIVING (ADL)
ADLS_ACUITY_SCORE: 39

## 2024-10-09 NOTE — PLAN OF CARE
Goal Outcome Evaluation:      Plan of Care Reviewed With: patient    Overall Patient Progress: improvingOverall Patient Progress: improving       Patient alert and oriented, able to make needs known  Slept most of the night, awaken in between urinal used.  No complained of pain, headache, chest pain, N&V, no SOB  Continent of Bladder, utilized urinal independently at night, LBM 10/6/2024  MAP via doppler 76 and parameters wnl most of the time but flow and PI not wnl at some point overnight ( see flowsheet )   Safety rounding checked completed, 3 side rails UP, bed alarm ON, call light/bedside table in reach  Plan of care ongoing.

## 2024-10-09 NOTE — CONSULTS
"[facility]    Stroke Telephone Note    I was called by Stanislaw Fountain on 10/09/24 regarding patient Duane C Johnson. The patient is a 74 year old male with PMH of HFrEF 2/2 ICM with LVEF 20-30%, atrial fibrillation, VT/VF arrest with ICD in place, coronary stent to LAD, and ambulatory shock. He presented 8/30/24 with worsening fatigue, BNP of 16K and left pleural effusion. Due to worsening functional limitations due to heart failure, patient was worked up for VAD while inpatient. Patient is now s/p implantation of HeartMate 3 LVAD on 9/18/2024. His post-operative course was complicated by Afib, pain, anxiety, insomnia, and transient right eye visual deficit. He discharged to acute rehab on 10/5/24.     Stroke code activated due to concern of generalized weakness, numbness and headache. LKW reported as 0715 when he worked with therapies and shortly after started feeling generally weak. Then around 1500 he had onset of moderate \"tension\" type headache and tingling in the bilateral fingertips. Per RRT URIAH patient now reports tingling has resolved and headache is improving; no deficits on examination. NIH reported as 0. Patient is noted to have MAP of 48, INR 2.2.     After discussion with RRT AAP, given absence of focal deficits and rapidly improving symptoms, opted to cancel stroke code and proceed with stat head CT alone.     Vitals  BP:  (MAP 72)   Pulse: 60   Resp: 16   Temp: 97.7  F (36.5  C)   Weight: 64.8 kg (142 lb 13.7 oz)    Stroke Code Data (for stroke code without tele)  Stroke code activated 10/09/24  1533   Stroke provider first response 10/09/24  1538   Last known normal 10/09/24  0715      Time of discovery (or onset of symptoms) 10/09/24  0715   Head CT read by Stroke Neuro Provider        Was stroke code de-escalated?  (CANCEL)           Imaging Findings  pending    Impression  Generalized weakness, resolved bilateral fingertip tingling and moderate " "headache    Recommendations   -stat head CT w/o contrast, ordered  -activate stroke code if patient develops focal neurological deficits    Case discussed with vascular neurology attending Dr. Goncalves.    My recommendations are based on the information provided over the phone by Duane C Johnson's in-person providers. They are not intended to replace the clinical judgment of his in-person providers. I was not requested to personally see or examine the patient at this time.     NADEGE Haq CNP  Vascular Neurology    To page me or covering stroke neurology team member, click here: AMCOM  Choose \"On Call\" tab at top, then select \"NEUROLOGY/ALL SITES\" from middle drop-down box, press Enter, then look for \"stroke\" or \"telestroke\" for your site.   "

## 2024-10-09 NOTE — PROGRESS NOTES
"  [facility]   Acute Rehabilitation Unit  Daily Progress Note    INTERVAL HISTORY  Notes and labs reviewed over past 24 hours. Duane's PI's were noted to be decreased this AM. Cardiology was notified and thought that this could have nguyen due to positioning and exertion, but did note that lability could be caused by alterations in volume status. In our discussion this afternoon, Duane states that he thinks this could be due to less fluid intake today. He feels fatigued, but denies any current chest pain, SOB, headaches, abdominal pain. Last BM occurred 2 days ago.     Later in the day, I was notified that a stroke code was activated due to headache, parasthesias, posterior headache, and low MAPS. CT head was ordered to rule out acute changes. Symptoms resolved without intervention.     ROS: 10 point ROS was assessed and was negative unless otherwise stated in HPI    Functionally,    With PT: 10/7  Current Status:  Bed Mobility: Maria Esther rolling, Sup<>sit SBA w/ bedrail   Transfer: SBA w/ 4WW   Gait: Up to 200' SBA w/ 4WW   Stairs: 6\" x4 w/ joel railings, CGA  Balance: Stable dynamic sitting, SBA for unsupported dynamic standing     With OT: 10/9  Current Status:  ADLs:  Mobility: S/SBA WWW if on wall power, nearing mod IND if on battery power  Grooming: IND standing at sink  Dressing: UB - set up LB - supervision with 4WW. Feet - supervision seated   Bathing: supervision with FB spongebath seated at EOB  Toileting: Supervision with 4WW  IADLs: TBD. Supportive spouse who can assist  Vision/Cognition: Pt scored 17/30 on SLUMs in 10/3/24. ACE-III on 10/8/24: 74/100 indicating cognitive deficits     With SLP: 10/9  Current Status:  Hearing: Increased volume  Vision: Readers  Communication: Motor speech, language intact  Cognition: Mild cognitive impairment- severe short term memory deficits  Swallow: Easy to chew solids (7)/Thin liquids (0). Pt reported due to lack of dentition, only wants " to eat chicken noodle soup and mashed potatoes. Clinical swallow evaluation not indicated at this time, MD can manage diet texture as it is related to dentition status only.         MEDICATIONS  Scheduled meds  Current Facility-Administered Medications   Medication Dose Route Frequency Provider Last Rate Last Admin    acetaminophen (TYLENOL) tablet 975 mg  975 mg Oral Q8H Toussaint Gonzalez, Paola, MD   975 mg at 10/09/24 0633    amiodarone (PACERONE) tablet 200 mg  200 mg Oral Daily Toussaint Gonzalez, Paola, MD   200 mg at 10/09/24 0941    atorvastatin (LIPITOR) tablet 80 mg  80 mg Oral QPM Toussaint Gonzalez, Paola, MD   80 mg at 10/08/24 2133    [Held by provider] bumetanide (BUMEX) tablet 0.5 mg  0.5 mg Oral Daily Toussaint Gonzalez, Paola, MD   0.5 mg at 10/07/24 0835    empagliflozin (JARDIANCE) tablet 10 mg  10 mg Oral Daily Toussaint Gonzalez, Paola, MD   10 mg at 10/09/24 0941    escitalopram (LEXAPRO) tablet 10 mg  10 mg Oral or Feeding Tube Daily Toussaint Gonzalez, Paola, MD   10 mg at 10/09/24 0942    gabapentin (NEURONTIN) capsule 100 mg  100 mg Oral or Feeding Tube At Bedtime Toussaint Gonzalez, Paola, MD   100 mg at 10/08/24 2134    hydrALAZINE (APRESOLINE) half-tab 75 mg  75 mg Oral TID Jazmin Waddell MD   75 mg at 10/09/24 0942    losartan (COZAAR) tablet 50 mg  50 mg Oral BID Toussaint Gonzalez, Paola, MD   50 mg at 10/09/24 0942    magnesium oxide (MAG-OX) tablet 400 mg  400 mg Oral Daily Toussaint Gonzalez, Paola, MD   400 mg at 10/09/24 0942    melatonin tablet 10 mg  10 mg Oral At Bedtime Toussaint Gonzalez, Paola, MD   10 mg at 10/08/24 2134    methocarbamol (ROBAXIN) tablet 750 mg  750 mg Oral TID Toussaint Gonzalez, Paola, MD   750 mg at 10/09/24 0941    multivitamin w/minerals (THERA-VIT-M) tablet 1 tablet  1 tablet Oral BID Toussaint Gonzalez, Paola, MD   1 tablet at 10/09/24 0941    pantoprazole (PROTONIX) EC tablet 40 mg  40 mg Oral QAM AC Toussaint Gonzalez, Paola, MD   40 mg at  10/09/24 0941    warfarin ANTICOAGULANT (COUMADIN) tablet 2.5 mg  2.5 mg Oral ONCE at 18:00 Stanislaw Fountain DO        Warfarin Dose Required Daily - Pharmacist Managed  1 each Oral See Admin Instructions Toussaint Gonzalez, Paola, MD           PRN meds:  Current Facility-Administered Medications   Medication Dose Route Frequency Provider Last Rate Last Admin    acetaminophen (TYLENOL) tablet 650 mg  650 mg Oral Q4H PRN Toussaint Gonzalez, Paola, MD        bisacodyl (DULCOLAX) suppository 10 mg  10 mg Rectal Daily PRN Toussaint Gonzalez, Paola, MD        lidocaine (LMX4) cream   Topical Q1H PRN Toussaint Gonzalez, Paola, MD        lidocaine 1 % 0.1-1 mL  0.1-1 mL Other Q1H PRN Toussaint Gonzalez, Paola, MD        naloxone (NARCAN) injection 0.2 mg  0.2 mg Intravenous Q2 Min PRN Toussaint Gonzalez, Paola, MD        Or    naloxone (NARCAN) injection 0.4 mg  0.4 mg Intravenous Q2 Min PRN Toussaint Gonzalez, Paola, MD        Or    naloxone (NARCAN) injection 0.2 mg  0.2 mg Intramuscular Q2 Min PRN Toussaint Gonzalez, Paola, MD        Or    naloxone (NARCAN) injection 0.4 mg  0.4 mg Intramuscular Q2 Min PRN Toussaint Gonzalez, Paola, MD        ondansetron (ZOFRAN ODT) ODT tab 4 mg  4 mg Oral Q6H PRN Toussaint Gonzalez, Paola, MD        oxyCODONE (ROXICODONE) tablet 5 mg  5 mg Oral Q4H PRN Toussaint Gonzalez, Paola, MD        Patient is already receiving anticoagulation with heparin, enoxaparin (LOVENOX), warfarin (COUMADIN)  or other anticoagulant medication   Does not apply Continuous PRN Toussaint Gonzalez, Paola, MD        polyethylene glycol (MIRALAX) Packet 17 g  17 g Oral or Feeding Tube BID PRN Toussaint Gonzalez, Paola, MD   17 g at 10/07/24 2136    senna-docusate (SENOKOT-S/PERICOLACE) 8.6-50 MG per tablet 2 tablet  2 tablet Oral or Feeding Tube BID PRN Toussaint Gonzalez, Paola, MD        sodium chloride (PF) 0.9% PF flush 3 mL  3 mL Intracatheter q1 min prn Toussaint Gonzalez, Paola, MD         PHYSICAL  "EXAM  BP (!) 81/65 (BP Location: Left arm, Patient Position: Sitting, Cuff Size: Adult Regular)   Pulse 60   Temp 97.7  F (36.5  C) (Oral)   Resp 18   Ht 1.676 m (5' 6\")   Wt 64.8 kg (142 lb 13.7 oz)   SpO2 96%   BMI 23.06 kg/m    General: in no acute distress, conversational and alert, resting comfortably in bed  HEENT: atraumatic, nares clear, conjunctiva white  Pulmonary: on room air, lungs clear to auscultation bilaterally  Cardiovascular: LVAD hum. Well perfused peripherally   Abdominal: soft, non-tender to palpation, non-distended  Extremities: warm peripherally, no edema in BLEs  Skin: warm, dry, without erythema, ecchymosis, or rash noted  MSK: no BLE edema noted, calves non-tender to palpation  Neuro: conjugate gaze, speech non-dysarthric    LABS  Results for orders placed or performed during the hospital encounter of 10/05/24 (from the past 24 hour(s))   INR   Result Value Ref Range    INR 2.20 (H) 0.85 - 1.15       ASSESSMENT AND PLAN  Duane C Johnson is a 74 year old right hand dominant male with a PMH of HFrEF 2/2 ICM with LVEF 20-30%, atrial fibrillation, 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 Due to worsening functional limitations due to heart failure, patient was worked up for VAD while inpatient. Patient is now s/p implantation of HeartMate 3 LVAD on 9/18/2024. His post-operative course was complicated by Afib, pain, anxiety, insomnia, and transient right eye visual deficit. He has also required medical mgmt of his acute blood loss anemia, fluid overload, hyperglycemia, hyponatremia and B pleural effusions. He is now medically stable, admitted to rehab on 10/05/2024 in setting of impaired strength, impaired balance and impaired activity tolerance.    Updates Today:  - Stroke code activated this PM for headache, parasthesia, low MAPs. Symptoms resolved without intervention. CT head " pending.      Admission to acute inpatient rehab 10/05/2024.    Impairment group code: 09 Cardiac: s/p Heartmate III LVAD placement in setting of acute on chronic systolic heart failure d/t ischemic cardiomyopathy     PT, OT and SLP 60 minutes of each, 6 days a week for 12 days,  in addition to rehab nursing and close management of physiatrist.    Impairment of ADL's: OT for 6 days a week for 12 days, to work on ADL re-training such as grooming, self cares and bathing.   Impairment of mobility:  PT  for 6 days a week for 12 days, to work on neuromuscular re-education focusing on strength, balance, coordination, and endurance.    Impairment of cognition/language/swallow:  SLP for 6 days a week for 12 days, to work on cognitive and linguistic deficits.  Rehab RN for med administration, bowel regimen, glucose monitoring and wound care.       Medical Conditions  # Acute on chronic systolic heart failure secondary to ICM   #HFrEF  s/p HM3 LVAD as DT on 9/18/2024  # CAD s/p PCI  # History of STEMI, CAD status post  PCI to LAD in 2023   # s/p ICD 5/8/2024 with history VT/VF arrest   Stage D. NYHA Class III.  Limited TTE 9/27 with LVIDd 4.0 cm, Closed AoV, no AI, moderately reduced RV, normal IVC. Multiple low flow alarms 10/1 received 50 mg hydralazine PO and 500 ml LR.   -Continue 50 mg losartan BID  -Hydralazine 50 mg increased to 75 mg TID  -Continue Empagliflozin 10 mg  -Continue Anticoagulation: warfarin dosing per pharmacy, INR goal 2-3.   Antiplatelet: ASA not indicated in LVAD population, > 6 months s/p STEMI     # Hyperlipidemia  -Continue atrovastatin 80mg      # History of VT/VF arrest 2023  # AFib s/p DCCV 9/2024 and again 9/30/2024  - Successfully cardioverted in early September for AF. Since then EKG suggested AF on 9/21. The patient was started on hep gtt 9/21, but unclear if was in AF before this. S/p MELBA and DCCV on 9/30 with conversion to sinus rhythm   -continue amiodarone 200 mg daily   -Continue AC as  above     # Visual changes, resolved.   Stroke code call 9/29 for visual changes - right eye with decreased upper half of vision and bluish haze. Resolved after 30 minutes. Seen by opthalmology and neuro. Negative head CT and CTA head and neck. He has had a few episodes which last about 5 minutes before resolving completely.   - Already on A/C     # Anemia  - Hgb 8.4 10/4. No overt bleeds, slow downtrend since surgery. LDH downtrending.   - Iron studies suggestive of RADHA. Recieved IV venofer x5 days     #Pain  No pain on admission  -Continue acetaminophen TID< can consider changing to PRN  -Continue gabapentin 100 mg bedtime     Other problems solved:     # Hypervolemic hyponatremia  Na 132, ranging 128-133  -Continue Bumex 0.5 mg daily  - Continue empagliflozin 10 mg daily      # Bilateral pleural effusions  -Chest tube removed 9/30.   -Left thoracentesis done 10/1, 1 L removed, R thoracentesis done 10/3, 1 L removed.   -Continue incentive spirometer     Adjustment to disability:  Clinical psychology to eval and treat TBD. Currently on escitalopram.   FEN: 0-Thin and 6-Soft and bite sized   Bowel: continent  Bladder: continent  DVT Prophylaxis: on warfarin  GI Prophylaxis: pantoprazole. Mg oxide QID for GERD  Code: Full code  Disposition: house with wife  TAINA:  TBD.  Rehab prognosis:  Anticipate w/ intensive PT/OT, skilled rehab nursing cares, and medical mgmt by PMR and HOSP providers, pt will achieve a level of mod IND for bed mobility, transfers, gait, stairs, ADLs, and be able to safely and independently manage his Heartmate III LVAD.   Follow up Appointments on Discharge:   PCP  Ophthalmology  PM&R   Outpatient therapies    Seen and discussed with Dr. Fountain, PM&R staff physician     --   Roberto Carlos Braswell MD  [facility] PM&R PGY-2  Pager: [phone]

## 2024-10-09 NOTE — PROGRESS NOTES
[facility]    Medicine Progress Note - Hospitalist Service    Date of Admission:  10/5/2024    Assessment & Plan   Duane C Johnson is a 74 year old male pmhx of anterior STEMI s/p PCI to the pLAD on 10/26/23 with a VT/VF arrest the next day (10/27) Placed on amiodarone and discharged 11/03/23, ICD was not indicated as this was within 48h of his MI. His EF prior to discharge was 20-30% with a large area of akinesis in the LAD, placed on apixaban due to this (Apixaban dcd on 7/5/24).   Had multiple admissions for HF exacerbation     Admitted on 8/30/24 for CHF exacerbation with BNP 16k. CPX and RHC showed significant functional limitations due to heart failue. Underwent HM3 LVAD on 9/18/24 needed mediastinal re-exploration for post operative hemorrhage.   Transferred to ARU on 10/5 for rehab             # Acute on chronic systolic heart failure secondary to ICM   #HFrEF  s/p HM3 LVAD as DT on 9/18/2024  # CAD s/p PCI  # History of STEMI, CAD status post  PCI to LAD in 2023   # s/p ICD 5/8/2024 with history VT/VF arrest   -Stage D. NYHA Class III.  - gets some shortness of breath  with therapy but not at rest    - had low PI 10/9 ad cardiology  was notified, monitor closely and call cardiology  if remain < 2  . Has had PI 1.6 7:50 AM with thapy and post recheck was 4.4 then again had 1.7  9;42  then 2.8 at 10    - 10/9 feeling more fatigued , I do have call out to cardiology    - goal MAP 65-85   and has been within range ( did have 1 lower at 62 AM 10/9)    - discussed with  cardiology  , will check EKG for rhythm   Fluid status: Bumex to 0.5 mg daily.   ACEi/ARB/ARNi: 50 mg losartan BID  Vasodilator: Hydralazine 50 mg TID increased to 75 mg tid 10/7   Received extra dose of hydralazine 25 mg 10/7    BB: Recent LVAD, not on any   Aldosterone antagonist deferred while other medical therapy is prioritized  SGLT2i:  Empagliflozin 10 mg  SCD prophylaxis: ICD  MAP: goal 65-85,    LDH trends: 230, stable  Anticoagulation: warfarin dosing per pharmacy, INR goal 2-3,   Antiplatelet: ASA not indicated in LVAD population, > 6 months s/p STEMI       # Low flow alarms- Resolved  # HTN  Multiple low flow alarms 10/1, none reported 10/2. Verified that doppler measure is MAP vs. SBP.   - Speed optimization echo performed 10/1, speed decreased to 5100  - Losartan 50 mg BID  - Hydralazine 50 mg TID--> 75 mg TID  10/7   - continue to  monitor closely      # History of VT/VF arrest 2023  # AFib s/p DCCV 9/5/2024 and again 9/30/2024  - Successfully cardioverted in early September for AF. Since then EKG suggested AF on 9/21. Tele is only available from as early as 9/22. The patient was started on hep gtt 9/21, but unclear if was in AF before this. While the patient was on hep gtt until INR was therapeutic, it is not possible to assess rhythm prior to 9/21. Systemic AC was off on 9/18-9/20. S/p MELBA and DCCV on 9/30 with conversion to sinus rhythm   - continue amiodarone 200 mg daily         # Visual changes, resolved.   -Stroke code call 9/29 for visual changes - right eye with decreased upper half of vision and bluish haze. Resolved after 30 minutes. Seen by opthalmology and neuro. Negative head CT and CTA head and neck. He has had 3 episodes which last about 5 minutes before resolving completely.   - Ophthalmology and Neuro, signed off  - Already on A/C  - Continue to monitor for changes     # Hypervolemic hyponatremia  Na 132, ranging 128-133  - encourage PO intake  - Bumex 0.5 mg daily now on hold , last received 10/5  , discussed with   cardiology and restart if start gaining weight or signs of  volume increase   , empagliflozin 10 mg daily      # Anemia  - Hg  stable in 8 range   - Iron studies suggestive of RADHA. Received IV venofer x5 days last day 10/2     # Bilateral pleural effusions  - Chest tube removed 9/30.   - Left thoracentesis done 10/1, 1 L removed, R thoracentesis done 10/3, 1 L removed.    CTM    #History localized prostate cancer, Sarah 4+7=7   - was not felt to be surgical candidate die to his heart disease   - received  Lupron q 3 months seems  received last dose this  year , radiation therapy             Diet: Snacks/Supplements Adult: Ensure Enlive; With Meals  Snacks/Supplements Adult: Magic Cup; With Meals  Room Service  Combination Diet Regular Diet; Easy to Chew (level 7); Thin Liquids (level 0)    DVT Prophylaxis: Warfarin  Michael Catheter: Not present  Lines: None     Cardiac Monitoring: None  Code Status: Full Code      Clinically Significant Risk Factors                     # End stage heart failure: Ventricular assist device (VAD) present              # Financial/Environmental Concerns:     # ICD device present          Jenniffer Rowland MD  Hospitalist Service  [facility]  Securely message with [employer] (more info)  Text page via [email or web address] Paging/Directory   ______________________________________________________________________    Interval History   Feeling weaker today, some more shortness of breath  with therapy yesterday. Has had PI < 2 but now over 2 abdominal MAPs hitting goal range ( I do have call out to cardiology   to discuss)     Physical Exam   Vital Signs: Temp: 97.7  F (36.5  C) Temp src: Oral BP: (!) 81/65 Pulse: 60   Resp: 18 SpO2: 96 % O2 Device: None (Room air)    Weight: 142 lbs 13.73 oz  General appearence: awake alert  in  no apparent distress    HEENT: EOMI, PEARLA, sclera nonicteric,  dry mucus membranes,   NECK : supple  RESPIRATORY: lungs clear  , LVAD sounds on left    CARDIOVASCULAR:S1 S2  LVAD sounds   GASTROINTESTINAL:soft, non-distended , non-tender , + bowel sounds, also LVAD sounds     SKIN: warm and dry, no mottling noted   NEUROLOGIC; awake alert and oriented,   EXTREMITIES: no clubbing, cyanosis or edema ,      Data     I have personally reviewed the following data over the past 24 hrs:    INR:  2.20 (H) PTT:   N/A   D-dimer:  N/A Fibrinogen:  N/A       Imaging results reviewed over the past 24 hrs:   No results found for this or any previous visit (from the past 24 hour(s)).

## 2024-10-09 NOTE — PROGRESS NOTES
Called patient's wife to discuss questions/ concerns that she had brought up to coordinator at rehab.     - Showering- patient is unable to shower until instructed by VAD team in the outpatient clinic, we will teach how to shower safely, how to use shower bag. Discussed until then patient is able to do sponge baths at home.    - Caregiver packet- discussed that this was a condensed version of the patient's binder which she has reviewed, it all the information that she has that she used to pass the written vad test yesterday. Vad coordinator will bring her a hard copy tomorrow.     -Outlets- She verified she has 3 prong outlets in their room- 4 to be exact, but they are currently using 3 ( 2 for her needs, 1 for his defibrillator) advised her to call device clinic to see if they are able to move defibrillator receptor out of their room, if unable feel free to call me back to discuss. Reviewed generator recommendations from [employer] (maker of heartmate3.)     Provided device clinic phone number, told her to call back with any further questions/ concerns.     Franchesca Haddad RN BSN   VAD Coordinator   Office: [phone]  24/7 On-Call VAD Pager: [phone] opt 4, ask to page VAD coordinator on call

## 2024-10-09 NOTE — PLAN OF CARE
"Goal Outcome Evaluation:      Plan of Care Reviewed With: patient    Overall Patient Progress: no changeOverall Patient Progress: no change         Orientation: Alert/oriented x4.   Bowel: Continent. LBM 10/6.   Bladder: Continent of bladder  Pain: Patient currently denies pain.   Ambulation/Transfers: SBA fww  Diet/ Liquids: Easy to chew, thin liquids, medications whole.   Tubes/ Lines/ Drains: LVAD Heartmate 3  Oxygen: on Room Air  Skin: LVAD site. Bruising to bilateral upper extremities.       Patient's LVAD P.I. had dropped to 1.6-1.7 while participating in therapy. Patient stated he felt weak in the AM but once back in bed P.I. increased to 4.4. After participating in therapy patient called around 1520 and told writer \"my vision is off, it is hard for me to focus.\" Upon assessment patient told writer there was new numbness to bilateral finger tips, and had a headache. Stroke code was called. CT completed, negative for stroke. Vascular access was called for placement of PIV to start 500 bolus of LR.     Bed alarm on for safety, call light within reach. Continue with POC.    "

## 2024-10-09 NOTE — PLAN OF CARE
Discharge Planner Post-Acute Rehab SLP:     Discharge Plan: home with wife. Ongoing SLP TBD    Precautions: LVAD    Current Status:  Hearing: Increased volume  Vision: Readers  Communication: Motor speech, language intact  Cognition: Mild cognitive impairment- severe short term memory deficits  Swallow: Easy to chew solids (7)/Thin liquids (0). Pt reported due to lack of dentition, only wants to eat chicken noodle soup and mashed potatoes. Clinical swallow evaluation not indicated at this time, MD can manage diet texture as it is related to dentition status only.     Assessment:   Instructed pt in moderate level deductive reasoning puzzle #1, pt with no independent accurac, required max cues and 6x direct instruction to record information, not clue number. Even with max cues puzzle not completed accurately.     Other Barriers to Discharge (Family Training, etc): level of assist/supervision with meds, financial, and LVAD mgmt?

## 2024-10-09 NOTE — PROGRESS NOTES
"Discharge Planner Post-Acute Rehab OT:      Discharge Plan: home with wife, HH OT.      Precautions: falls, sternal, cardiac, LVAD, short term memory deficits     Current Status:  ADLs:  Mobility: S/SBA WWW if on wall power, nearing mod IND if on battery power  Grooming: IND standing at sink  Dressing: UB - set up LB - supervision with 4WW. Feet - supervision seated   Bathing: supervision with FB spongebath seated at EOB  Toileting: Supervision with 4WW  IADLs: TBD. Supportive spouse who can assist  Vision/Cognition: Pt scored 17/30 on SLUMs in 10/3/24. ACE-III on 10/8/24: 74/100 indicating cognitive deficits      Assessment:   Pt limited during OT session d/t low PI when seated upright and c/o of feeling \"weak\". Notified nsg. Simulated bed mobility to home environment with bed height at 29\" high with bed flat and no bedrail with pt able to transfer in/out of bed independently. Provided pt with a foot stool to trial to utilize which pt did not require to complete. Attempted to call spouse to provide therapy update and plan for discharge as pt is nearing meeting therapy goals. PT will call spouse this afternoon to provide an update.    -20 d/t low PT and nsg cares    Other Barriers to Discharge (DME, Family Training, etc):   Family training prior to discharge with spouse  AE/DME: Shower chair  "

## 2024-10-09 NOTE — CODE/RAPID RESPONSE
Rapid Response Team Note    Assessment   A rapid response was called on Duane C Johnson due to stroke code. Symptoms included headache and bilateral numbness/tingling to fingers.   Plan   -  NIHSS 0 on exam; numbness/tingling resolved  -  Spoke with stroke team, okay to de-escalate stroke code  -  Continue with STAT head CT to r/o bleed  -  The Internal Medicine primary team was able to be reached and they are in agreement with the above plan.  -  Disposition: The patient will remain on the current unit. We will continue to monitor this patient closely.  -  Reassessment and plan follow-up will be performed by the primary team    NADEGE Addison CNP  Rapid Response Team URIAH  Securely message with [facility] Hospital Course   Brief Summary of events leading to rapid response:   Duane C Johnson is a 74 year old male pmhx of anterior STEMI s/p PCI to the pLAD on 10/26/23 with a VT/VF arrest the next day (10/27). Placed on amiodarone and discharged 11/03/23, ICD was not indicated as this was within 48h of his MI. His EF prior to discharge was 20-30% with a large area of akinesis in the LAD, placed on apixaban due to this (Apixaban dcd on 7/5/24). Patient has had multiple admissions for HF exacerbation. Patient then admitted on 8/30/24 for CHF exacerbation with BNP 16k. CPX and RHC showed significant functional limitations due to heart failue. He underwent HM3 LVAD on 9/18/24 that needed mediastinal re-exploration for post operative hemorrhage. He was then transferred to ARU on 10/5 for rehab.    After working with therapy, patient experienced new onset bilateral numbness/tingling of fingertips and a headache at the back/base of his head. Pressure on LVAD was 48 when symptoms started. Upon arrival to room, NIHSS 0. No focal deficits and numbness/tingling had resolved. Pressure on LVAD back to 70. I spoke with neurology. Agreed that with no focal deficits, it was okay to de-escalate the stroke code; however, recommended  STAT head CT to r/o bleed. The IM following MD was contacted and spoke with cards about the LVAD readings.  Physical Exam   Vital Signs: Temp: 97.7  F (36.5  C) Temp src: Oral BP: (!) 81/65 Pulse: 60   Resp: 16 SpO2: 98 % O2 Device: None (Room air)    Weight: 142 lbs 13.73 oz  Exam:   Neurologic: Awake, alert, oriented to name, place and time.  Cranial nerves II-XII are grossly intact.  Motor is 5 out of 5 bilaterally.  Cerebellar finger to nose, heel to shin intact.  Sensory is intact.    NIHSS 0.

## 2024-10-09 NOTE — PLAN OF CARE
Post Rounds Family Phone Call  Staff involved: Franchesca Marc DPT  Length of call: 20 minutes  Family involved: patient and pt spouse Melissa  Projected discharge date: Saturday 9/13  Projected discharge location: home  Equipment needs: 4WW  Called pt spouse with patient to provide therapy update, identify barriers to discharge, and to engage in caregiver connection session. Both patient and spouse identify they do not feel ready for discharge Saturday, siting rationale of grab bars not being installed, and ongoing medical questions/concerns. Therapist endorsed grab bars are not necessary for safe mobility, and pulling on grab bars strongly is against patient's sternal precautions. Planned caregiver connection session Saturday 9:30-11am for demonstration of functional mobility.

## 2024-10-09 NOTE — PROGRESS NOTES
Preet the RN called about Duane's PI. While getting up with PT, his PI dropped to 1.6. Duane was not dizzy.  After he was done with therapy his VAD numbers were baseline 5100, 3.5 flow, 4.4 PI, and 3.3 jackson. Earlier this morning his MAP was 76 but they hadn't gotten a MAP now since the low PI. His weight is basically stable in the high 130s to low 140s. Preet thinks Duane is drinking enough fluids.    I said it is common for the PI to move around with position changes and exertion. I asked Preet to get a MAP now to make sure it is within the 65-85 goal range. I asked him to call the primary team if his map is out of range or if he starts having dizziness with low PIs or if the PI stays below 2. Sometimes labile PIs can be a sign of hypertension or a sign of dehydration.

## 2024-10-09 NOTE — PLAN OF CARE
"Discharge Plan: home w/ spouse assist    Precautions: fall, LVAD    Current Status:  Bed Mobility: Viri rolling, Sup<>sit SBA w/ bedrail   Transfer: SBA w/ 4WW   Gait: Up to 200' SBA w/ 4WW   Stairs: 6\" x4 w/ joel railings, CGA  Balance: Stable dynamic sitting, SBA for unsupported dynamic standing     Outcome Measures:   5TSTS (modified, light UE push off): 32 seconds on 10/9  6MWT: max amb distance 150' on 10/9.     Assessment: low PI limiting AM session with  PI drop to 1.6 in standing. Pt able to demonstrate in-room mobility, and amb >150' however end of session experiences PI 1.7 symptomatic. Called pt spouse with pt to address any therapy concerns and set up family training for demonstration of pt functional status (no true hands-on training required as pt SBA-nearing Viri pending PI variability), FT Saturday 9:30-11.     Other Barriers to Discharge (DME, Family Training, etc):   3STE w/ railings. 10STI w/ chair lift available    Family training: no true hands-on training required as pt SBA-nearing Viri pending PI variability, FT Saturday 9:30-11.     4WW: needed prior to discharge  "

## 2024-10-09 NOTE — PROGRESS NOTES
I was notified that stroke team STAT was called on patient  for headache,  tingling in fingers post therapy that resolved after got back to bed. Code stroke cancelled    Pressures were low at 48, now back to 70  RRT discussed with  neurology and patient  is heading down to CT   I spoke  with cardiology  and recommended to give him fluid bolus 500 mls and also check labs and monitor for infection  His EKG earlier was sinus    Dr. Beltran can be reached form cardiology after 5 PM if needed     Jenniffer Rowland MD

## 2024-10-09 NOTE — CONSULTS
"Consult received for Vascular access care.  See LDA for details. For additional needs place \"Nursing to Consult for Vascular Access\" [id number] order in EPIC.  "

## 2024-10-10 ENCOUNTER — APPOINTMENT (OUTPATIENT)
Dept: OCCUPATIONAL THERAPY | Facility: CLINIC | Age: 74
DRG: 949 | End: 2024-10-10
Attending: PHYSICAL MEDICINE & REHABILITATION
Payer: MEDICARE

## 2024-10-10 ENCOUNTER — APPOINTMENT (OUTPATIENT)
Dept: PHYSICAL THERAPY | Facility: CLINIC | Age: 74
DRG: 949 | End: 2024-10-10
Attending: PHYSICAL MEDICINE & REHABILITATION
Payer: MEDICARE

## 2024-10-10 ENCOUNTER — APPOINTMENT (OUTPATIENT)
Dept: CARDIOLOGY | Facility: CLINIC | Age: 74
DRG: 949 | End: 2024-10-10
Attending: NURSE PRACTITIONER
Payer: MEDICARE

## 2024-10-10 ENCOUNTER — APPOINTMENT (OUTPATIENT)
Dept: SPEECH THERAPY | Facility: CLINIC | Age: 74
DRG: 949 | End: 2024-10-10
Attending: PHYSICAL MEDICINE & REHABILITATION
Payer: MEDICARE

## 2024-10-10 LAB
ANION GAP SERPL CALCULATED.3IONS-SCNC: 4 MMOL/L (ref 7–15)
BUN SERPL-MCNC: 14.6 MG/DL (ref 8–23)
CALCIUM SERPL-MCNC: 8.2 MG/DL (ref 8.8–10.4)
CHLORIDE SERPL-SCNC: 98 MMOL/L (ref 98–107)
CREAT SERPL-MCNC: 0.82 MG/DL (ref 0.67–1.17)
EGFRCR SERPLBLD CKD-EPI 2021: >90 ML/MIN/1.73M2
ERYTHROCYTE [DISTWIDTH] IN BLOOD BY AUTOMATED COUNT: 17.1 % (ref 10–15)
GLUCOSE SERPL-MCNC: 80 MG/DL (ref 70–99)
HCO3 SERPL-SCNC: 26 MMOL/L (ref 22–29)
HCT VFR BLD AUTO: 27.8 % (ref 40–53)
HGB BLD-MCNC: 9.3 G/DL (ref 13.3–17.7)
INR PPP: 2.12 (ref 0.85–1.15)
LVEF ECHO: NORMAL
MCH RBC QN AUTO: 32.1 PG (ref 26.5–33)
MCHC RBC AUTO-ENTMCNC: 33.5 G/DL (ref 31.5–36.5)
MCV RBC AUTO: 96 FL (ref 78–100)
PLATELET # BLD AUTO: 220 10E3/UL (ref 150–450)
POTASSIUM SERPL-SCNC: 4.1 MMOL/L (ref 3.4–5.3)
RBC # BLD AUTO: 2.9 10E6/UL (ref 4.4–5.9)
SODIUM SERPL-SCNC: 128 MMOL/L (ref 135–145)
WBC # BLD AUTO: 5.5 10E3/UL (ref 4–11)

## 2024-10-10 PROCEDURE — 93306 TTE W/DOPPLER COMPLETE: CPT

## 2024-10-10 PROCEDURE — 36415 COLL VENOUS BLD VENIPUNCTURE: CPT | Performed by: INTERNAL MEDICINE

## 2024-10-10 PROCEDURE — 99233 SBSQ HOSP IP/OBS HIGH 50: CPT | Mod: GC

## 2024-10-10 PROCEDURE — 97110 THERAPEUTIC EXERCISES: CPT | Mod: GP

## 2024-10-10 PROCEDURE — 99233 SBSQ HOSP IP/OBS HIGH 50: CPT | Performed by: INTERNAL MEDICINE

## 2024-10-10 PROCEDURE — 128N000003 HC R&B REHAB

## 2024-10-10 PROCEDURE — 93306 TTE W/DOPPLER COMPLETE: CPT | Mod: 26 | Performed by: INTERNAL MEDICINE

## 2024-10-10 PROCEDURE — 85027 COMPLETE CBC AUTOMATED: CPT | Performed by: INTERNAL MEDICINE

## 2024-10-10 PROCEDURE — 97129 THER IVNTJ 1ST 15 MIN: CPT | Mod: GN

## 2024-10-10 PROCEDURE — 85610 PROTHROMBIN TIME: CPT

## 2024-10-10 PROCEDURE — 250N000013 HC RX MED GY IP 250 OP 250 PS 637: Performed by: PHYSICAL MEDICINE & REHABILITATION

## 2024-10-10 PROCEDURE — 97130 THER IVNTJ EA ADDL 15 MIN: CPT | Mod: GN

## 2024-10-10 PROCEDURE — 80048 BASIC METABOLIC PNL TOTAL CA: CPT | Performed by: INTERNAL MEDICINE

## 2024-10-10 PROCEDURE — 250N000013 HC RX MED GY IP 250 OP 250 PS 637

## 2024-10-10 PROCEDURE — 97535 SELF CARE MNGMENT TRAINING: CPT | Mod: GO | Performed by: OCCUPATIONAL THERAPIST

## 2024-10-10 RX ORDER — WARFARIN SODIUM 2.5 MG/1
2.5 TABLET ORAL
Status: COMPLETED | OUTPATIENT
Start: 2024-10-10 | End: 2024-10-10

## 2024-10-10 RX ADMIN — ATORVASTATIN CALCIUM 80 MG: 80 TABLET, FILM COATED ORAL at 20:18

## 2024-10-10 RX ADMIN — ACETAMINOPHEN 975 MG: 325 TABLET, FILM COATED ORAL at 04:56

## 2024-10-10 RX ADMIN — ESCITALOPRAM OXALATE 10 MG: 10 TABLET ORAL at 08:17

## 2024-10-10 RX ADMIN — LOSARTAN POTASSIUM 50 MG: 50 TABLET, FILM COATED ORAL at 08:17

## 2024-10-10 RX ADMIN — GABAPENTIN 100 MG: 100 CAPSULE ORAL at 20:17

## 2024-10-10 RX ADMIN — Medication 1 TABLET: at 20:17

## 2024-10-10 RX ADMIN — ACETAMINOPHEN 975 MG: 325 TABLET, FILM COATED ORAL at 14:20

## 2024-10-10 RX ADMIN — METHOCARBAMOL 750 MG: 750 TABLET ORAL at 14:20

## 2024-10-10 RX ADMIN — Medication 1 TABLET: at 08:18

## 2024-10-10 RX ADMIN — MAGNESIUM OXIDE TAB 400 MG (241.3 MG ELEMENTAL MG) 400 MG: 400 (241.3 MG) TAB at 08:17

## 2024-10-10 RX ADMIN — ACETAMINOPHEN 975 MG: 325 TABLET, FILM COATED ORAL at 20:18

## 2024-10-10 RX ADMIN — METHOCARBAMOL 750 MG: 750 TABLET ORAL at 08:17

## 2024-10-10 RX ADMIN — EMPAGLIFLOZIN 10 MG: 10 TABLET, FILM COATED ORAL at 08:17

## 2024-10-10 RX ADMIN — LOSARTAN POTASSIUM 50 MG: 50 TABLET, FILM COATED ORAL at 20:18

## 2024-10-10 RX ADMIN — AMIODARONE HYDROCHLORIDE 200 MG: 200 TABLET ORAL at 08:18

## 2024-10-10 RX ADMIN — SENNOSIDES AND DOCUSATE SODIUM 2 TABLET: 50; 8.6 TABLET ORAL at 08:17

## 2024-10-10 RX ADMIN — METHOCARBAMOL 750 MG: 750 TABLET ORAL at 20:18

## 2024-10-10 RX ADMIN — WARFARIN SODIUM 2.5 MG: 2.5 TABLET ORAL at 18:14

## 2024-10-10 RX ADMIN — PANTOPRAZOLE SODIUM 40 MG: 40 TABLET, DELAYED RELEASE ORAL at 04:56

## 2024-10-10 RX ADMIN — Medication 10 MG: at 20:18

## 2024-10-10 ASSESSMENT — ACTIVITIES OF DAILY LIVING (ADL)
ADLS_ACUITY_SCORE: 39
ADLS_ACUITY_SCORE: 39
ADLS_ACUITY_SCORE: 40
ADLS_ACUITY_SCORE: 39
ADLS_ACUITY_SCORE: 40
ADLS_ACUITY_SCORE: 39
ADLS_ACUITY_SCORE: 40
ADLS_ACUITY_SCORE: 39
ADLS_ACUITY_SCORE: 40
ADLS_ACUITY_SCORE: 40
ADLS_ACUITY_SCORE: 39
ADLS_ACUITY_SCORE: 40
ADLS_ACUITY_SCORE: 40
ADLS_ACUITY_SCORE: 39

## 2024-10-10 NOTE — PROGRESS NOTES
"  [facility]   Acute Rehabilitation Unit  Daily Progress Note    INTERVAL HISTORY  Notes and labs reviewed over past 24 hours.  On-call physician was contacted overnight due to low flow/elevated pulse index.  This was discussed with on-call LVAD RN, who did not recommend any immediate treatment. In review, patient's flow has ranged from 2.6-4.1 and PI has ranged from 1.7-7.6 over the previous 24 hours.     During an OT appointment today, Mr. Aguila reported feeling weakness/tingling in his left upper extremity accompanied by dizziness. This was resolved after transition back to bed. However, at that point he was found to have MAPs below his goal of 60-85 and flow was decreased. Hospitalist and cardiologist services saw the patient and plan was made to proceed with increased fluid intake, holding antihypertensives, and performing transthoracic echocardiogram. Mr. Aguila additionally felt a moderately short of breath during PT today. No other concerns. Last BM was today.     ROS: 10 point ROS was assessed and was negative unless otherwise stated in HPI    Functionally,    With PT: 10/7  Current Status:  Bed Mobility: Maria Esther rolling, Sup<>sit SBA w/ bedrail   Transfer: SBA w/ 4WW   Gait: Up to 200' SBA w/ 4WW   Stairs: 6\" x4 w/ joel railings, CGA  Balance: Stable dynamic sitting, SBA for unsupported dynamic standing     With OT: 10/9  Current Status:  ADLs:  Mobility: S/SBA WWW if on wall power, nearing mod IND if on battery power  Grooming: IND standing at sink  Dressing: UB - set up LB - supervision with 4WW. Feet - supervision seated   Bathing: supervision with FB spongebath seated at EOB  Toileting: Supervision with 4WW  IADLs: TBD. Supportive spouse who can assist  Vision/Cognition: Pt scored 17/30 on SLUMs in 10/3/24. ACE-III on 10/8/24: 74/100 indicating cognitive deficits     With SLP: 10/9  Current Status:  Hearing: Increased volume  Vision: Readers  Communication: Motor " speech, language intact  Cognition: Mild cognitive impairment- severe short term memory deficits  Swallow: Easy to chew solids (7)/Thin liquids (0). Pt reported due to lack of dentition, only wants to eat chicken noodle soup and mashed potatoes. Clinical swallow evaluation not indicated at this time, MD can manage diet texture as it is related to dentition status only.     MEDICATIONS  Scheduled meds  Current Facility-Administered Medications   Medication Dose Route Frequency Provider Last Rate Last Admin    acetaminophen (TYLENOL) tablet 975 mg  975 mg Oral Q8H Toussaint Gonzalez, Paola, MD   975 mg at 10/10/24 0456    amiodarone (PACERONE) tablet 200 mg  200 mg Oral Daily Toussaint Gonzalez, Paola, MD   200 mg at 10/09/24 0941    atorvastatin (LIPITOR) tablet 80 mg  80 mg Oral QPM Toussaint Gonzalez, Paola, MD   80 mg at 10/09/24 2111    [Held by provider] bumetanide (BUMEX) tablet 0.5 mg  0.5 mg Oral Daily Toussaint Gonzalez, Paola, MD   0.5 mg at 10/07/24 0835    empagliflozin (JARDIANCE) tablet 10 mg  10 mg Oral Daily Toussaint Gonzalez, Paola, MD   10 mg at 10/09/24 0941    escitalopram (LEXAPRO) tablet 10 mg  10 mg Oral or Feeding Tube Daily Toussaint Gonzalez, Paola, MD   10 mg at 10/09/24 0942    gabapentin (NEURONTIN) capsule 100 mg  100 mg Oral or Feeding Tube At Bedtime Toussaint Gonzalez, Paola, MD   100 mg at 10/09/24 2111    [Held by provider] hydrALAZINE (APRESOLINE) half-tab 75 mg  75 mg Oral TID Jazmin Waddell MD   75 mg at 10/09/24 0942    losartan (COZAAR) tablet 50 mg  50 mg Oral BID Toussaint Gonzalez, Paola, MD   50 mg at 10/09/24 2111    magnesium oxide (MAG-OX) tablet 400 mg  400 mg Oral Daily Toussaint Gonzalez, Paola, MD   400 mg at 10/09/24 0942    melatonin tablet 10 mg  10 mg Oral At Bedtime Toussaint Gonzalez, Paola, MD   10 mg at 10/09/24 2111    methocarbamol (ROBAXIN) tablet 750 mg  750 mg Oral TID Toussaint Gonzalez, Paola, MD   750 mg at 10/09/24 2111    multivitamin w/minerals  (THERA-VIT-M) tablet 1 tablet  1 tablet Oral BID Toussaint Gonzalez, Paola, MD   1 tablet at 10/09/24 2111    pantoprazole (PROTONIX) EC tablet 40 mg  40 mg Oral QAM AC Toussaint Gonzalez, Paola, MD   40 mg at 10/10/24 0456    Warfarin Dose Required Daily - Pharmacist Managed  1 each Oral See Admin Instructions Toussaint Gonzalez, Paola, MD           PRN meds:  Current Facility-Administered Medications   Medication Dose Route Frequency Provider Last Rate Last Admin    acetaminophen (TYLENOL) tablet 650 mg  650 mg Oral Q4H PRN Toussaint Gonzalez, Paola, MD        bisacodyl (DULCOLAX) suppository 10 mg  10 mg Rectal Daily PRN Toussaint Gonzalez, Paola, MD        lidocaine (LMX4) cream   Topical Q1H PRN Toussaint Gonzalez, Paola, MD        lidocaine 1 % 0.1-1 mL  0.1-1 mL Other Q1H PRN Toussaint Gonzalez, Paola, MD        naloxone (NARCAN) injection 0.2 mg  0.2 mg Intravenous Q2 Min PRN Toussaint Gonzalez, Paola, MD        Or    naloxone (NARCAN) injection 0.4 mg  0.4 mg Intravenous Q2 Min PRN Toussaint Gonzalez, Paola, MD        Or    naloxone (NARCAN) injection 0.2 mg  0.2 mg Intramuscular Q2 Min PRN Toussaint Gonzalez, Paola, MD        Or    naloxone (NARCAN) injection 0.4 mg  0.4 mg Intramuscular Q2 Min PRN Toussaint Gonzalez, Paola, MD        ondansetron (ZOFRAN ODT) ODT tab 4 mg  4 mg Oral Q6H PRN Toussaint Gonzalez, Paola, MD        oxyCODONE (ROXICODONE) tablet 5 mg  5 mg Oral Q4H PRN Toussaint Gonzalez, Paola, MD        Patient is already receiving anticoagulation with heparin, enoxaparin (LOVENOX), warfarin (COUMADIN)  or other anticoagulant medication   Does not apply Continuous PRN Toussaint Gonzalez, Paola, MD        polyethylene glycol (MIRALAX) Packet 17 g  17 g Oral or Feeding Tube BID PRN Toussaint Gonzalez, Paola, MD   17 g at 10/07/24 2159    senna-docusate (SENOKOT-S/PERICOLACE) 8.6-50 MG per tablet 2 tablet  2 tablet Oral or Feeding Tube BID PRN Toussaint Estuardo, Shakira, MD        sodium chloride  "(PF) 0.9% PF flush 3 mL  3 mL Intracatheter q1 min prn Toussaint Gonzalez, Paola, MD         PHYSICAL EXAM  BP (!) 81/65 (BP Location: Left arm, Patient Position: Sitting, Cuff Size: Adult Regular)   Pulse 60   Temp 97.7  F (36.5  C) (Oral)   Resp 16   Ht 1.676 m (5' 6\")   Wt 64.8 kg (142 lb 13.7 oz)   SpO2 98%   BMI 23.06 kg/m    General: in no acute distress, conversational and alert, resting comfortably in bed  HEENT: atraumatic, nares clear, conjunctiva white  Pulmonary: on room air, lungs clear to auscultation bilaterally  Cardiovascular: LVAD hum. Well perfused peripherally   Abdominal: soft, non-tender to palpation, non-distended  Extremities: warm peripherally, no edema in BLEs  Skin: warm, dry, without erythema, ecchymosis, or rash noted  MSK: no BLE edema noted, calves non-tender to palpation  Neuro: conjugate gaze, speech non-dysarthric    LABS  Results for orders placed or performed during the hospital encounter of 10/05/24 (from the past 24 hour(s))   EKG 12-lead, complete   Result Value Ref Range    Systolic Blood Pressure  mmHg    Diastolic Blood Pressure  mmHg    Ventricular Rate 75 BPM    Atrial Rate 75 BPM    VT Interval 224 ms    QRS Duration 162 ms     ms    QTc 489 ms    P Axis -29 degrees    R AXIS 238 degrees    T Axis 131 degrees    Interpretation ECG       ** Poor data quality, interpretation may be adversely affected  Sinus rhythm with 1st degree A-V block  Non-specific intra-ventricular conduction block  Lateral infarct (cited on or before 21-Sep-2024)  Abnormal ECG  When compared with ECG of 30-Sep-2024 11:16,  QRS duration has increased  QRS voltage has increased  Criteria for Inferior infarct are no longer Present  Serial changes of Lateral infarct Present     Glucose by meter   Result Value Ref Range    GLUCOSE BY METER POCT 95 70 - 99 mg/dL   CT Head w/o Contrast    Narrative    CT HEAD W/O CONTRAST 10/9/2024 5:06 PM    History: new onset headache     Comparison: CT " 9/29/2024, 9/3/2024    Technique: Using multidetector thin collimation helical acquisition  technique, axial, coronal and sagittal CT images from the skull base  to the vertex were obtained without intravenous contrast.   (topogram) image(s) also obtained and reviewed.    Findings: There is no intracranial hemorrhage, mass effect, or midline  shift. Gray/white matter differentiation in both cerebral hemispheres  is preserved. Ventricles are proportionate to the cerebral sulci. Mild  patchy periventricular white matter hypoattenuation, nonspecific but  likely represents chronic small vessel ischemic disease. Unchanged  slightly asymmetric enlargement of the right frontal extra-axial space  relative to the left.    The bony calvaria and the bones of the skull base are normal. Chronic  opacification of the left maxillary sinus. Unchanged thickening of the  maxillary sinus walls bilaterally. The remainder of the visualized  paranasal sinuses and mastoid air cells are clear. Orbits are grossly  normal.      Impression    Impression:  1. No acute intracranial pathology.   2. Unchanged slightly asymmetric right frontal extra-axial space  compared to the left.  3. Chronic maxillary sinusitis.    I have personally reviewed the examination and initial interpretation  and I agree with the findings.    SJ SAUCEDA MD         SYSTEM ID:  [id number]   CBC with Platelets & Differential    Narrative    The following orders were created for panel order CBC with Platelets & Differential.  Procedure                               Abnormality         Status                     ---------                               -----------         ------                     CBC with platelets and d...[[id number]]  Abnormal            Final result                 Please view results for these tests on the individual orders.   Comprehensive metabolic panel   Result Value Ref Range    Sodium 131 (L) 135 - 145 mmol/L    Potassium 4.4 3.4 - 5.3  mmol/L    Carbon Dioxide (CO2) 24 22 - 29 mmol/L    Anion Gap 9 7 - 15 mmol/L    Urea Nitrogen 15.7 8.0 - 23.0 mg/dL    Creatinine 0.82 0.67 - 1.17 mg/dL    GFR Estimate >90 >60 mL/min/1.73m2    Calcium 8.3 (L) 8.8 - 10.4 mg/dL    Chloride 98 98 - 107 mmol/L    Glucose 140 (H) 70 - 99 mg/dL    Alkaline Phosphatase 113 40 - 150 U/L    AST 32 0 - 45 U/L    ALT 31 0 - 70 U/L    Protein Total 5.9 (L) 6.4 - 8.3 g/dL    Albumin 2.6 (L) 3.5 - 5.2 g/dL    Bilirubin Total 0.8 <=1.2 mg/dL   CBC with platelets and differential   Result Value Ref Range    WBC Count 6.6 4.0 - 11.0 10e3/uL    RBC Count 3.03 (L) 4.40 - 5.90 10e6/uL    Hemoglobin 9.5 (L) 13.3 - 17.7 g/dL    Hematocrit 29.4 (L) 40.0 - 53.0 %    MCV 97 78 - 100 fL    MCH 31.4 26.5 - 33.0 pg    MCHC 32.3 31.5 - 36.5 g/dL    RDW 17.4 (H) 10.0 - 15.0 %    Platelet Count 209 150 - 450 10e3/uL    % Neutrophils 84 %    % Lymphocytes 6 %    % Monocytes 8 %    % Eosinophils 1 %    % Basophils 1 %    % Immature Granulocytes 0 %    NRBCs per 100 WBC 0 <1 /100    Absolute Neutrophils 5.6 1.6 - 8.3 10e3/uL    Absolute Lymphocytes 0.4 (L) 0.8 - 5.3 10e3/uL    Absolute Monocytes 0.5 0.0 - 1.3 10e3/uL    Absolute Eosinophils 0.0 0.0 - 0.7 10e3/uL    Absolute Basophils 0.0 0.0 - 0.2 10e3/uL    Absolute Immature Granulocytes 0.0 <=0.4 10e3/uL    Absolute NRBCs 0.0 10e3/uL   INR   Result Value Ref Range    INR 2.12 (H) 0.85 - 1.15   CBC with platelets   Result Value Ref Range    WBC Count 5.5 4.0 - 11.0 10e3/uL    RBC Count 2.90 (L) 4.40 - 5.90 10e6/uL    Hemoglobin 9.3 (L) 13.3 - 17.7 g/dL    Hematocrit 27.8 (L) 40.0 - 53.0 %    MCV 96 78 - 100 fL    MCH 32.1 26.5 - 33.0 pg    MCHC 33.5 31.5 - 36.5 g/dL    RDW 17.1 (H) 10.0 - 15.0 %    Platelet Count 220 150 - 450 10e3/uL   Basic metabolic panel   Result Value Ref Range    Sodium 128 (L) 135 - 145 mmol/L    Potassium 4.1 3.4 - 5.3 mmol/L    Chloride 98 98 - 107 mmol/L    Carbon Dioxide (CO2) 26 22 - 29 mmol/L    Anion Gap 4 (L) 7 -  15 mmol/L    Urea Nitrogen 14.6 8.0 - 23.0 mg/dL    Creatinine 0.82 0.67 - 1.17 mg/dL    GFR Estimate >90 >60 mL/min/1.73m2    Calcium 8.2 (L) 8.8 - 10.4 mg/dL    Glucose 80 70 - 99 mg/dL       ASSESSMENT AND PLAN  Duane C Johnson is a 74 year old right hand dominant male with a PMH of HFrEF 2/2 ICM with LVEF 20-30%, atrial fibrillation, 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 Due to worsening functional limitations due to heart failure, patient was worked up for VAD while inpatient. Patient is now s/p implantation of HeartMate 3 LVAD on 9/18/2024. His post-operative course was complicated by Afib, pain, anxiety, insomnia, and transient right eye visual deficit. He has also required medical mgmt of his acute blood loss anemia, fluid overload, hyperglycemia, hyponatremia and B pleural effusions. He is now medically stable, admitted to rehab on 10/05/2024 in setting of impaired strength, impaired balance and impaired activity tolerance.    Updates Today:  - Volume Status: Low MAPs and flow overnight with volatile pulse index. Likely in setting of hypovolemia. Cardiology evaluated the patient and recommended TTE which remains pending. Holding hydralazine tonight along with jardiance. Encouraging increased PO fluid intake.      Admission to acute inpatient rehab 10/05/2024.    Impairment group code: 09 Cardiac: s/p Heartmate III LVAD placement in setting of acute on chronic systolic heart failure d/t ischemic cardiomyopathy     PT, OT and SLP 60 minutes of each, 6 days a week for 12 days,  in addition to rehab nursing and close management of physiatrist.    Impairment of ADL's: OT for 6 days a week for 12 days, to work on ADL re-training such as grooming, self cares and bathing.   Impairment of mobility:  PT  for 6 days a week for 12 days, to work on neuromuscular re-education focusing on strength, balance,  coordination, and endurance.    Impairment of cognition/language/swallow:  SLP for 6 days a week for 12 days, to work on cognitive and linguistic deficits.  Rehab RN for med administration, bowel regimen, glucose monitoring and wound care.       Medical Conditions  # Acute on chronic systolic heart failure secondary to ICM   #HFrEF  s/p HM3 LVAD as DT on 9/18/2024  # CAD s/p PCI  # History of STEMI, CAD status post  PCI to LAD in 2023   # s/p ICD 5/8/2024 with history VT/VF arrest   Stage D. NYHA Class III.  Limited TTE 9/27 with LVIDd 4.0 cm, Closed AoV, no AI, moderately reduced RV, normal IVC. Multiple low flow alarms 10/1 received 50 mg hydralazine PO and 500 ml LR.   -Continue 50 mg losartan BID  -Hydralazine 50 mg increased to 75 mg TID. Currently on hold   -Continue Empagliflozin 10 mg. Holding due to hypovolemia.   -Continue Anticoagulation: warfarin dosing per pharmacy, INR goal 2-3.   Antiplatelet: ASA not indicated in LVAD population, > 6 months s/p STEMI     # Hyperlipidemia  -Continue atrovastatin 80mg      # History of VT/VF arrest 2023  # AFib s/p DCCV 9/2024 and again 9/30/2024  - Successfully cardioverted in early September for AF. Since then EKG suggested AF on 9/21. The patient was started on hep gtt 9/21, but unclear if was in AF before this. S/p MELBA and DCCV on 9/30 with conversion to sinus rhythm   -continue amiodarone 200 mg daily   -Continue AC as above     # Visual changes, resolved.   Stroke code call 9/29 for visual changes - right eye with decreased upper half of vision and bluish haze. Resolved after 30 minutes. Seen by opthalmology and neuro. Negative head CT and CTA head and neck. He has had a few episodes which last about 5 minutes before resolving completely.   - Already on A/C     # Anemia  - Hgb 8.4 10/4. No overt bleeds, slow downtrend since surgery. LDH downtrending.   - Iron studies suggestive of RADHA. Recieved IV venofer x5 days     #Pain  No pain on admission  -Continue  acetaminophen TID< can consider changing to PRN  -Continue gabapentin 100 mg bedtime     Other problems solved:     # Hypervolemic hyponatremia  Na 132, ranging 128-133  -Continue Bumex 0.5 mg daily  - Continue empagliflozin 10 mg daily      # Bilateral pleural effusions  -Chest tube removed 9/30.   -Left thoracentesis done 10/1, 1 L removed, R thoracentesis done 10/3, 1 L removed.   -Continue incentive spirometer     Adjustment to disability:  Clinical psychology to eval and treat TBD. Currently on escitalopram.   FEN: 0-Thin and 6-Soft and bite sized   Bowel: continent  Bladder: continent  DVT Prophylaxis: on warfarin  GI Prophylaxis: pantoprazole. Mg oxide QID for GERD  Code: Full code  Disposition: house with wife  ELOS:  TBD.  Rehab prognosis:  Anticipate w/ intensive PT/OT, skilled rehab nursing cares, and medical mgmt by PMR and HOSP providers, pt will achieve a level of mod IND for bed mobility, transfers, gait, stairs, ADLs, and be able to safely and independently manage his Heartmate III LVAD.   Follow up Appointments on Discharge:   PCP  Ophthalmology  PM&R   Outpatient therapies  Cardiology    Seen and discussed with Dr. Fountain, PM&R staff physician     --   Roberto Carlos Braswell MD  [facility] PM&R PGY-2  Pager: [phone]

## 2024-10-10 NOTE — PLAN OF CARE
Discharge Planning:     Writer made home care referral yesterday for home care PT/OT/RN. [facility] has accepted patient for services. Will update IDT and patient/spouse.     [facility]  [address]   [place] [address]  PH: [phone]    Tamiko Pantoja, RN, BSN, CRRN  [facility] Patient Care Supervisor  Acute Rehabilitation Unit  PH: [phone]  Pager: [phone]

## 2024-10-10 NOTE — PLAN OF CARE
Goal Outcome Evaluation:      Plan of Care Reviewed With: patient    Overall Patient Progress: no changeOverall Patient Progress: no change       Patient slept most of the night, awaken at around 4am when LVAD started beeping, LVAD parameters fluctuates at this time, PI goes up to 9.5 and Flow down to 2.6, Patient denied chest pain, headache, no lightheadedness, SOB, denied any discomfort. MAP elevated as well 94-96 rechecked twice but still in upper 90's. Paged on call , per on call will reach out to Cardiology and address it in the morning. Notified CN on duty.

## 2024-10-10 NOTE — PROGRESS NOTES
[facility]   Cardiology II Service / Advanced Heart Failure  ARU/TCU Consult Note      Patient: Duane C Johnson  MRN: [id number]  Admission Date: 8/30/2024  Hospital Day # 5    Assessment and Plan: Duane C Johnson is a 74 year old male pmhx of anterior STEMI s/p PCI to the pLAD on 10/26/23 with a VT/VF arrest the next day (10/27) with patent stents and unchanged anatomy on repeat angiogram. Placed on amiodarone and discharged 11/03/23, ICD was not indicated as this was within 48h of his MI. His EF prior to discharge was 20-30% with a large area of akinesis in the LAD, placed on apixaban due to this (Apixaban dcd on 7/5/24). Has had multiple admissions for HF exacerbation last year.  Admitted on 8/30/24 for CHF exacerbation with BNP 16k. CPX and RHC showed significant functional limitations due to heart failue. Underwent HM3 LVAD on 9/18/24. Discharged to ARU on 10/5/24.     Recommendations:  - VAD interrogation with several low flow alarms in early morning hours with high MAPs (90s) follows by low MAPs (58-60) mid morning. Will hold jardiance, update TTE (ordered and will follow up on additional recs pending findings), encourage PO intake  - consider ENT evaluation or OP referral for chronic maxillary sinusitis with reports of nasal congestion/obstruction, facial pressure/headaches, decreased sense of smell     Addendum 10/10: Echo with septum bowing towards the RV, IVC normal, AV opening every beat, LViDD 4.7 cm. Will continue jardiance and optimize afterload reduction as MAPs allow. If MAPs > 85  for more that 50% of the time would resume hydralazine at 12.5 mg TID. If there continues to be low flows but MAPs wnl, will need to consider other causes/evaluation +/- CT/RHC.     # Acute on chronic systolic heart failure secondary to ICM   # s/p HM3 LVAD as DT on 9/18/2024  # CAD s/p PCI  # History of STEMI  # s/p ICD 5/2024  Stage D. NYHA Class III.  Fluid status: euvolemic by exam, TTE with evidence of  septum bowing into RV, LV measuring larger at 4.7cm, holding bumex 0.5 mg PO, low flow alarms 10/10, 10/9 hypotensive with MAP 48, given  mL bolus  ACEi/ARB/ARNi: losartan 50 BID  Vasodilator: holding hydralazine 50 mg TID for hypotension  BB: Recent LVAD, will defer to outpatient  Aldosterone antagonist deferred while other medical therapy is prioritized  SGLT2i:  Empagliflozin 10 mg  SCD prophylaxis: ICD  MAP: goal 65-85, current MAPs 80-85 with occasional outliers, low MAP 48 on 10/9 and high  on 10/7  LDH trends: 271, stable, please monitor weekly   Anticoagulation: warfarin dosing per pharmacy, INR goal 2-3, today 2.12.   Antiplatelet: ASA not indicated in LVAD population, > 6 months s/p STEMI  Limited TTE 9/27 with LVIDd 4.0 cm, Closed AoV, no AI, moderately reduced RV, normal IVC. Multiple low flow alarms 10/1 received 50 mg hydralazine PO and 500 ml LR.   - LVAD clinic follow up 10/16/24    The patient's HeartMate LVAD was interrogated 10/10/2024  * Speed 5100 rpm   * Pulsatility index 4.4   * Power 3.7 Dahl   * Flow 4.1 L/minute   Fluid status: hypovolemic    Alarms were reviewed, and notable for several low flow alarms this morning 4-6 AM with frequent PI events.   The driveline exit site was inspected, c/d/i.   All external components were inspected and showed no evidence of damage or malfunction, none replaced.   No changes to VAD settings made    # Low flow alarms  # Elevated PIs  # Labile MAPs   # Suspected orthostatic hypotension   Multiple low flow alarms 10/1, none reported 10/2. Verified that doppler measure is MAP vs. SBP. Elevated PIs on 10/7 up to 7-9 with a brief low flow alarm, MAPs , Hydralazine was increased to 75 mg TID. 10/9 MAPs 48-60s, hydralazine held.   - Speed optimization echo performed 10/1, speed decreased to 5100  - Losartan 50 mg BID  - Holding Hydralazine 75 mg TID    # History of VT/VF arrest 2023  # AFib s/p DCCV 9/2024 and again 9/30/2024  - Successfully  "cardioverted in early September for AF. Since then EKG suggested AF on 9/21. Tele is only available from as early as 9/22. The patient was started on hep gtt 9/21, but unclear if was in AF before this. While the patient was on hep gtt until INR was therapeutic, it is not possible to assess rhythm prior to 9/21. Systemic AC was off on 9/18-9/20. S/p MELBA and DCCV on 9/30 with conversion to sinus rhythm   - continue amiodarone 200 mg daily   - Continue AC as above    # Visual changes, resolved.   # Generalized weakness  # Headache  Stoke code call 9/29 for visual changes - right eye with decreased upper half of vision and bluish haze. Resolved after 30 minutes. Seen by opthalmology and neuro. Negative head CT and CTA head and neck. He has had 3 episodes which last about 5 minutes before resolving completely.     On 10/9 Stroke code activated due to concern of generalized weakness, numbness and headache. LKW reported as 0715 when he worked with therapies and shortly after started feeling generally weak. Then around 1500 he had onset of moderate \"tension\" type headache and tingling in the bilateral fingertips. Per RRT URIAH patient now reports tingling has resolved and headache is improving. MAP 48, INR 2.2. Given absence of focal deficits and rapidly improving symptoms, opted to cancel stroke.  CT head negative, though did show chronic maxillary sinusitis.   - Ophthalmology and Neuro, signed off  - Already on A/C  - Continue to monitor for changes  - OP neuro eye 10/22/24  - Recommend ENT evaluation for symptomatic chronic maxillary sinusitis     # Hypovolemia hyponatremia   Na 128, ranging 128-133  - encourage PO intake, discussed that he should drink minimally 3 16 oz of water daily  - daily BMP  - hold empagliflozin 10 mg daily     # Anemia  - Hgb 8.4 10/4. No overt bleeds, slow downtrend since surgery. LDH downtrending.   - Iron studies suggestive of RADHA. Started on IV venofer x5 days    # Bilateral pleural " "effusions  - Chest tube removed 9/30.   - Left thoracentesis done 10/1, 1 L removed, R thoracentesis done 10/3, 1 L removed.       Time/Communication  I spent 55 minutes reviewing results, care team notes, meeting directly with the patient, coordinating care, and completing documentation on the day of service.     Sirisha Arias DNP, NP-C  Nurse Practitioner - Advanced Heart Failure/Cardiology II Service  [place] preferred or Pager [phone]  ================================================================    Subjective/24-Hr Events:   Overnight had LVAD alarm for low flow 2.6 with correlating high PI 9.5, MAP 96 at this time. No overnight interventions. Today, Duane reports feeling okay. He has not been out of bed due to low flows overnight and low MAPs (60s) this morning. He feels weak, but denies any unilateral weakness, numbness, tingling. No vision changes. Headache resolved. Breathing feels comfortable. Estimates that he's drinking ~ 32 oz of water/juice per day, along with 16 oz Ensure.       ROS:  4 point ROS including respiratory, CV, GI and  (other than that noted in the HPI) is negative.     Medications: Reviewed in EPIC.     Physical Exam:   BP (!) 81/65 (BP Location: Left arm, Patient Position: Sitting, Cuff Size: Adult Regular)   Pulse 80   Temp 97.7  F (36.5  C) (Oral)   Resp 16   Ht 1.676 m (5' 6\")   Wt 64.8 kg (142 lb 13.7 oz)   SpO2 97%   BMI 23.06 kg/m      GENERAL: Appears comfortable, in no distress.    HEENT: Eye symmetrical, no discharge or icterus bilaterally.  NECK: Supple, JVD < 6 cm at 90 degrees   CV: + LVAD hum.   RESPIRATORY: Respirations regular, even, slightly labored. Lungs CTA throughout.    GI: Soft and non distended with normoactive bowel sounds present in all quadrants. No tenderness, rebound, guarding.   EXTREMITIES: No peripheral edema. All extremities are warm and well perfused. Thready peripheral pulses present.   NEUROLOGIC: Alert and interacting appropriately. No " focal deficits.   SKIN: No jaundice. No rashes or lesions. Sternum c/d/i    Labs:  CMP  Recent Labs   Lab 10/10/24  0628 10/09/24  1928 10/09/24  1540 10/07/24  0602 10/06/24  2102 10/06/24  0836 10/05/24  0527 10/04/24  0447   * 131*  --  132*  --  132* 131* 132*   POTASSIUM 4.1 4.4  --  4.1  --  4.6 4.0 4.4   CHLORIDE 98 98  --  99  --  98 100 100   CO2 26 24  --  26  --  29 25 24   ANIONGAP 4* 9  --  7  --  5* 6* 8   GLC 80 140* 95 87   < > 112* 77 81   BUN 14.6 15.7  --  16.8  --  15.9 16.0 13.9   CR 0.82 0.82  --  0.79  --  0.78 0.72 0.69   GFRESTIMATED >90 >90  --  >90  --  >90 >90 >90   PRANAV 8.2* 8.3*  --  8.4*  --  8.5* 8.0* 8.0*   MAG  --   --   --   --   --  1.9 1.7 1.8   PHOS  --   --   --   --   --  2.9 2.9 2.6   PROTTOTAL  --  5.9*  --   --   --   --   --   --    ALBUMIN  --  2.6*  --   --   --   --   --   --    BILITOTAL  --  0.8  --   --   --   --   --   --    ALKPHOS  --  113  --   --   --   --   --   --    AST  --  32  --   --   --   --   --   --    ALT  --  31  --   --   --   --   --   --     < > = values in this interval not displayed.       CBC  Recent Labs   Lab 10/10/24  0628 10/09/24  1928 10/07/24  0602 10/06/24  0836   WBC 5.5 6.6 5.8 7.3   RBC 2.90* 3.03* 2.85* 2.98*   HGB 9.3* 9.5* 8.9* 9.4*   HCT 27.8* 29.4* 27.3* 29.0*   MCV 96 97 96 97   MCH 32.1 31.4 31.2 31.5   MCHC 33.5 32.3 32.6 32.4   RDW 17.1* 17.4* 17.2* 17.8*    209 211 240       INR  Recent Labs   Lab 10/10/24  0628 10/09/24  0553 10/08/24  0555 10/07/24  0602   INR 2.12* 2.20* 2.10* 2.15*

## 2024-10-10 NOTE — PROGRESS NOTES
Discharge Planner Post-Acute Rehab OT:      Discharge Plan: home with wife, HH OT.      Precautions: falls, sternal, cardiac, LVAD, short term memory deficits     Current Status:  ADLs:  Mobility: S/SBA WWW if on wall power, nearing mod IND if on battery power  Grooming: IND standing at sink  Dressing: UB - IND LB - IND with 4WW  Bathing: supervision with FB spongebath seated at EOB  Toileting: Supervision with 4WW  IADLs: TBD. Supportive spouse who can assist  Vision/Cognition: Pt scored 17/30 on SLUMs in 10/3/24. ACE-III on 10/8/24: 74/100 indicating cognitive deficits      Assessment:   Pt limited during OT session d/t fluctuating PI (1.7-7.3) with pt c/o of dizziness/lightheadedness and L arm tightness. Notified MD and nsg. Pt is nearing mod IND with ADLs but unable to progress d/t labile PI's.      -30 d/t fluctuating PI and L arm tightness, and med pass     Other Barriers to Discharge (DME, Family Training, etc):   Family training prior to discharge with spouse  AE/DME: Shower chair

## 2024-10-10 NOTE — PROGRESS NOTES
"[facility]    Medicine Progress Note - Hospitalist Service    Date of Admission:  10/5/2024    Assessment & Plan   Duane C Johnson is a 74 year old male pmhx of anterior STEMI s/p PCI to the pLAD on 10/26/23 with a VT/VF arrest the next day (10/27) Placed on amiodarone and discharged 11/03/23, ICD was not indicated as this was within 48h of his MI. His EF prior to discharge was 20-30% with a large area of akinesis in the LAD, placed on apixaban due to this (Apixaban dcd on 7/5/24).   Had multiple admissions for HF exacerbation     Admitted on 8/30/24 for CHF exacerbation with BNP 16k. CPX and RHC showed significant functional limitations due to heart failue. Underwent HM3 LVAD on 9/18/24 needed mediastinal re-exploration for post operative hemorrhage.   Transferred to ARU on 10/5 for rehab      #Weakness  #headache,  tingling in fingers  -10/9  post therapy that resolved after got back to bed. Code stroke cancelled but then cancelled as symptoms resolved and had no focal deficits   - discussed with  cardiology  and gave 500 ml fluid bolus 10/9  - head CT was negative for acute intracranial process   -  10/9 EKG  sinus  - monitor for infection  , no leukocytosis, no fever noted   - 10/10 after got up post BM had some left arm \" clumsiness\"  now gone, did have headache now also gone. He is not the best historian  -states he heard alarm around 4 AM, this AM had MAP 72 then post medications 60-- 58, flow 3.7 PI 3.1   - hydralazin held after AM dose 10/9   - reached out to cads, heart failure  team will see 10/10          #hyponatremia  - Na 131 10/9-->128     Weights 10/5 66.8---64.1 ( bedside scale)  --63.3 ( standing scale) --64.8 ---61.96 10/10 on standing scale       # Acute on chronic systolic heart failure secondary to ICM   #HFrEF  s/p HM3 LVAD as DT on 9/18/2024  # CAD s/p PCI  # History of STEMI, CAD status post  PCI to LAD in 2023   # s/p ICD 5/8/2024 with history " VT/VF arrest   -Stage D. NYHA Class III.  - gets some shortness of breath  with therapy but not at rest    - had low PI 10/9 ad cardiology  was notified, monitor closely and call cardiology  if remain < 2  . Has had PI 1.6 7:50 AM with thapy and post recheck was 4.4 then again had 1.7  9;42  then 2.8 at 10    - 10/9 feeling more fatigued , I do have call out to cardiology    - goal MAP 65-85      -  10/9  EKG  sinus  rhythm   - 10/9 500 ml fluid bolus   Fluid status: Bumex to 0.5 mg daily. ( Has been on hold )   ACEi/ARB/ARNi: 50 mg losartan BID  Vasodilator: Hydralazine 50 mg TID increased to 75 mg tid 10/7  and held form 10/9   Received extra dose of hydralazine 25 mg 10/7    BB: Recent LVAD, not on any   Aldosterone antagonist deferred while other medical therapy is prioritized  SGLT2i:  Empagliflozin 10 mg  SCD prophylaxis: ICD  MAP: goal 65-85,   LDH trends: 230, stable  Anticoagulation: warfarin dosing per pharmacy, INR goal 2-3,   Antiplatelet: ASA not indicated in LVAD population, > 6 months s/p STEMI       # Low flow alarms- Resolved  # HTN  Multiple low flow alarms 10/1, none reported 10/2. Verified that doppler measure is MAP vs. SBP.   - Speed optimization echo performed 10/1, speed decreased to 5100  - Losartan 50 mg BID  - Hydralazine 50 mg TID--> 75 mg TID  10/7   - continue to  monitor closely      # History of VT/VF arrest 2023  # AFib s/p DCCV 9/5/2024 and again 9/30/2024  - Successfully cardioverted in early September for AF. Since then EKG suggested AF on 9/21. Tele is only available from as early as 9/22. The patient was started on hep gtt 9/21, but unclear if was in AF before this. While the patient was on hep gtt until INR was therapeutic, it is not possible to assess rhythm prior to 9/21. Systemic AC was off on 9/18-9/20. S/p MELBA and DCCV on 9/30 with conversion to sinus rhythm   - continue amiodarone 200 mg daily      # Visual changes, resolved.   -Stroke code call 9/29 for visual changes -  right eye with decreased upper half of vision and bluish haze. Resolved after 30 minutes. Seen by opthalmology and neuro. Negative head CT and CTA head and neck. He has had 3 episodes which last about 5 minutes before resolving completely.   - Ophthalmology and Neuro, signed off  - Already on A/C  - Continue to monitor for changes     # Hypervolemic hyponatremia  Na 132, ranging 128-133  - encourage PO intake  - Bumex 0.5 mg daily now on hold , last received 10/5  , discussed with   cardiology and restart if start gaining weight or signs of  volume increase   , empagliflozin 10 mg daily   - did pascual 500 ml fluid bolus 10/9      # Anemia  - Hg  stable in 8 range   - Iron studies suggestive of RADHA. Received IV venofer x5 days last day 10/2     # Bilateral pleural effusions  - Chest tube removed 9/30.   - Left thoracentesis done 10/1, 1 L removed, R thoracentesis done 10/3, 1 L removed.   CTM    #History localized prostate cancer, [place] 4+7=7   - was not felt to be surgical candidate die to his heart disease   - received  Lupron q 3 months seems  received last dose this  year , radiation therapy             Diet: Room Service  Combination Diet Regular Diet; Easy to Chew (level 7); Thin Liquids (level 0)  Snacks/Supplements Adult: Ensure Enlive; With Meals  Snacks/Supplements Adult: Magic Cup; With Meals  Snacks/Supplements Adult: Other; Cottage cheese + strawberry Greek yogurt TID with meals; With Meals    DVT Prophylaxis: Warfarin  Michael Catheter: Not present  Lines: None     Cardiac Monitoring: None  Code Status: Full Code      Clinically Significant Risk Factors         # Hyponatremia: Lowest Na = 128 mmol/L in last 2 days, will monitor as appropriate       # Hypoalbuminemia: Lowest albumin = 2.6 g/dL at 10/9/2024  7:28 PM, will monitor as appropriate        # End stage heart failure: Ventricular assist device (VAD) present           # Severe Malnutrition: based on nutrition assessment    # Financial/Environmental  Concerns:     # ICD device present          Jenniffer Rowland MD  Hospitalist Service  [facility]  Securely message with [employer] (more info)  Text page via [place] Paging/Directory   ______________________________________________________________________    Interval History   Had some left arm weakness clumsiness in AM after was unit(s) for BM, feels ok, states heard alarm go off overnight   Denies CP no shortness of breath , gets shortness of breath  with therapy     Physical Exam   Vital Signs: Temp: 97.7  F (36.5  C) Temp src: Oral   Pulse: 80   Resp: 16 SpO2: 97 % O2 Device: None (Room air)    Weight: 142 lbs 13.73 oz  General appearence: awake alert  in  no apparent distress    HEENT: EOMI, PEARLA, sclera nonicteric,  dry mucus membranes,   NECK : supple  RESPIRATORY: lungs clear  , LVAD sounds on left    CARDIOVASCULAR:  LVAD sounds   GASTROINTESTINAL:soft, non-distended , non-tender , + bowel sounds, also LVAD sounds     SKIN: warm and dry, no mottling noted   NEUROLOGIC; awake alert and oriented,   EXTREMITIES: no clubbing, cyanosis or edema ,      Data     I have personally reviewed the following data over the past 24 hrs:    5.5  \   9.3 (L)   / 220     128 (L) 98 14.6 /  80   4.1 26 0.82 \     ALT: 31 AST: 32 AP: 113 TBILI: 0.8   ALB: 2.6 (L) TOT PROTEIN: 5.9 (L) LIPASE: N/A     INR:  2.12 (H) PTT:  N/A   D-dimer:  N/A Fibrinogen:  N/A       Imaging results reviewed over the past 24 hrs:   Recent Results (from the past 24 hour(s))   CT Head w/o Contrast    Narrative    CT HEAD W/O CONTRAST 10/9/2024 5:06 PM    History: new onset headache     Comparison: CT 9/29/2024, 9/3/2024    Technique: Using multidetector thin collimation helical acquisition  technique, axial, coronal and sagittal CT images from the skull base  to the vertex were obtained without intravenous contrast.   (topogram) image(s) also obtained and reviewed.    Findings: There is no intracranial  hemorrhage, mass effect, or midline  shift. Gray/white matter differentiation in both cerebral hemispheres  is preserved. Ventricles are proportionate to the cerebral sulci. Mild  patchy periventricular white matter hypoattenuation, nonspecific but  likely represents chronic small vessel ischemic disease. Unchanged  slightly asymmetric enlargement of the right frontal extra-axial space  relative to the left.    The bony calvaria and the bones of the skull base are normal. Chronic  opacification of the left maxillary sinus. Unchanged thickening of the  maxillary sinus walls bilaterally. The remainder of the visualized  paranasal sinuses and mastoid air cells are clear. Orbits are grossly  normal.      Impression    Impression:  1. No acute intracranial pathology.   2. Unchanged slightly asymmetric right frontal extra-axial space  compared to the left.  3. Chronic maxillary sinusitis.    I have personally reviewed the examination and initial interpretation  and I agree with the findings.    SJ SAUCEDA MD         SYSTEM ID:  [id number]

## 2024-10-10 NOTE — PLAN OF CARE
Goal Outcome Evaluation:      Plan of Care Reviewed With: patient    Overall Patient Progress: no change  VS: MAP 84   O2: SpO2 98% and stable on RA. Denies dizziness .   Output: Voids spontaneously without difficulty to bathroom / using beside urinal .   Last BM: 10/6, denies abdominal discomfort. BS active / passing flatus.    Activity: Up with SBA with a walker.   Skin: WDL except, Sternal incision, chest tube sites and drive line surgical site.   Pain: Denies pain.   Orientation: Intact, AOx4.    Dressing: New dressing applied to the LVAD Drive line insertion site.   Diet: Easy to chew diet, thin liquids and pills whole. Denies nausea/vomiting.   LDA: Left PIV S/L   Equipment: IV pole, personal belongings,    Plan: Continue with plan of care. Call light within reach, pt able to make needs known.    Additional Info: LVAD numbers within normal parameters.

## 2024-10-10 NOTE — PLAN OF CARE
Acute Rehab Care Conference/Team Rounds      Type: Team Rounds    Present: Dr. Fountain, Resident Roberto Carlos Braswell, Dr. Marquez Neuropsychologist, Franchesca Marc PT, Ellie Covington OT, Dev Ward SLP, Tamiko Pantoja PCS, Emy Delaney , Indiana Gaffney RD, Eduarda Gonzalez RN, and Duane Johnson Patient.      Discharge Barriers/Treatment/Education    Rehab Diagnosis: post LVAD     Active Medical Co-morbidities/Prognosis:     Patient Active Problem List   Diagnosis    Hyperlipidemia LDL goal <130    Prostate cancer (H)    Systolic CHF (H)    Anxiety    Atherosclerosis of native coronary artery of native heart with angina pectoris (H)    Left inguinal hernia    Ischemic cardiomyopathy    Anticoagulated    Hypotension    Acute on chronic systolic congestive heart failure (H)    Personal history of malignant neoplasm of prostate    Pleural effusion    History of tobacco use    AICD (automatic cardioverter/defibrillator) present    Acute exacerbation of CHF (congestive heart failure) (H)    Atrial fibrillation, unspecified type (H)    LVAD (left ventricular assist device) present (H)    Chronic systolic congestive heart failure (H)    Anticoagulated on warfarin    History of left ventricular assist device (LVAD) (H)         Safety: SBA with walker, alert and oriented, calls appropriately, bed alarm ON    Pain: no reported pain overnight but have scheduled Tylenol    Medications, Skin, Tubes/Lines: takes medication whole, Sternal incision MELISSA, CT sites with dressing, Driveline dressing C/D/I, Left PIV saline locked.     Swallowing/Nutrition: Easy to chew solids (7)/Thin liquids (0). Pt reported to SLP he requested modified solids due to edentulous status, also reported only wanting to eat chicken noodle soups and mashed potatoes with gravy 3x meals a day also related to edentulous status. Clinical swallow evaluation not completed with ARU SLP, not indicated as SLP cannot improve dentition  status.    Bowel/Bladder: Continent of Bladder and Bowel, utilized urinal independently , voiding well, LBM 10/6/2024    Psychosocial: Lives at home with spouse[place] Extended family supportive. Recently received LVAD. Retired.     ADLs/IADLs: Pt is making steady progress with ADLs and is nearing mod IND with 4WW in BR on battery power, but unable to progress d/t labile PI's. Pt is IND with UBD and LBD tasks with 4WW. Pt requires supervision with toileting with 4WW. Pt is IND with G/H tasks standing at sink with 4WW. Pt requires supervision with ADls/mobility with 4WW when on wall power d/t poor safety awareness with cords. Pt scored 74/100 on ACE-III indicating cognitive deficits with largest deficits in memory and visuospatial skills. Recommend assistance with IADLs upon discharge d/t cognitive deficits and sternal precautions.  OT services upon discharge.     Mobility: Pt demonstrates good progress in functional mobility with in-room mobility nearing Viri pending symptoms and stability with PI. Pt demonstrates bed mobility IND, transfers and gait Viri w/ 4WW. Stairs SBA-Viri with B rails. Pt will benefit from 4WW at discharge, planning to acquire from [facility]. HH PT recommended at discharge.       Cognition/Language: Motor speech and language intact. RBANS form A cognitive evaluation administered 10/06 revealed overall mild cognitive impairments with severe immediate and delayed recall. Pt reported noticing memory has declined over lat ~5 years. During structured cognitive tasks since evaluation, pt has demo'd rigidity of thinking and limited response/implementation to cues/instruction to increase accuracy/efficiency with activities. Suspect personality contributes to this behavior. Ongoing SLP recommended for cognitive-linguistic interventions to promote safety and independence with high level medical management.    Community Re-Entry:  therapies recommended prior to community  re-entry    Transportation: anticipate spouse to assist. Car transfer SBA-Viri    Decision maker: self    Plan of Care and goals reviewed and updated.    Discharge Plan/Recommendations    Fall Precautions: continue    Patient/Family input to goals: yes     Estimated length of stay:  14 days     Overall plan for the patient:  reach a level of mod I       Utilization Review and Continued Stay Justification    Medical Necessity Criteria:    For any criteria that is not met, please document reason and plan for discharge, transfer, or modification of plan of care to address.    Requires intensive rehabilitation program to treat functional deficits?: Yes    Requires 3x per week or greater involvement of rehabilitation physician to oversee rehabilitation program?: Yes    Requires rehabilitation nursing interventions?: Yes    Patient is making functional progress?: Yes    There is a potential for additional functional progress? Yes    Patient is participating in therapy 3 hours per day a minimum of 5 days per week or 15 hours per week in 7 day period?:Yes    Has discharge needs that require coordinated discharge planning approach?:Yes      Barriers/Concerns related to meeting medical necessity criteria:  none    Team Plan to Address Concern:  As needed       Final Physician Sign off    Statement of Approval:     I agree with all the recommendations detailed in this document.      Stanislaw Fountain, DO      Patient Goals  Social Work Goals: Confirm discharge recommendations with therapy, coordinate safe discharge plan and remain available to support and assist as needed.    OT Predicted Duration/Target Date for Goal Attainment: 10/12/24  Therapy Frequency (OT): 6 times/week  OT: Hygiene/Grooming: modified independent: GOAL MET  OT: Lower Body Dressing: Modified independent: GOAL MET  OT: Lower Body Bathing: Modified independent  OT: Meal Preparation: Modified independent  OT: Goal 1: Pt demonstrate independence with HEP to  increase strength and ind with ADLs       PT Predicted Duration/Target Date for Goal Attainment: 10/12/24  PT Frequency: 6x/week  PT: Bed Mobility: Independent  PT: Transfers: Supervision/stand-by assist  PT: Gait: Modified independent, 150 feet  PT: Stairs: Supervision/stand-by assist, Greater than 10 stairs  PT: Goal 1: Car transfer w/ SBA, walker  PT: Goal 2: Pt and spouse will be able to independently verbalize 3 fall prevention strategies following education.                      SLP Predicted Duration/Target Date for Goal Attainment: 10/12/24  Therapy Frequency (SLP Eval): 6 times/week  SLP: Goal 1: With use of trained memory strategies, pt will recall daily/functional information with 100% accuracy.  SLP: Goal 2: Patient will complete high level/compelxity reasoning/problem solving with 90% or greater independent accuracy.           Nursing Goals  Bowel and Bladder care  Fall prevention   Medication Education  Skin Care protection         Plan of Care Reviewed With: patient    Overall Patient Progress: no changeOverall Patient Progress: no change

## 2024-10-10 NOTE — PLAN OF CARE
"Goal Outcome Evaluation:    Outcome Evaluation: Pt AxO, able to make needs known. VSS at the beginning of the shift, denied pain. LVAD parameters WDL. Shortly after the start of AM therapy, LVAD parameters started fluctuating, with patient informing therapist that he feels dizzy and had some numbness down his LUE. Providers informed. Hospitalist also came to evaluate patient. He stated he felt better once laid down in bed. New orders in place. Vital signs and LVAD parameters monitored frequently throughout the shift. Patient denied pain, dizziness, numbness and tingling throughout the rest of the shift, but stated he feels tired and is not up to doing the \"physical\" therapies, but participated in SLP. On regular texture, thin liquid diet. Able to take his meds whole with thin liquids. Continent of bowel and bladder, able to use the urinal. Had large BM this AM in the BSC during therapy session (prior to patient feeling dizzy). Had team rounds this AM. Care plan in place.      "

## 2024-10-10 NOTE — PLAN OF CARE
Discharge Planner Post-Acute Rehab SLP:     Discharge Plan: home with wife. Ongoing SLP TBD    Precautions: LVAD    Current Status:  Hearing: Increased volume  Vision: Readers  Communication: Motor speech, language intact  Cognition: Mild cognitive impairment- severe short term memory deficits  Swallow: Easy to chew solids (7)/Thin liquids (0). Pt reported due to lack of dentition, only wants to eat chicken noodle soup and mashed potatoes. Clinical swallow evaluation not indicated at this time, MD can manage diet texture as it is related to dentition status only.     Assessment:  Pt engaged in HAWA subtests as task. Pt completed reading medication task with 80% accuracy IND, 100% min-mod cues. Noting slow processing speed, some difficulties with abstract reasoning and working memory.     Other Barriers to Discharge (Family Training, etc): level of assist/supervision with meds, financial, and LVAD mgmt?

## 2024-10-10 NOTE — PROGRESS NOTES
"Presented to ARU at bedside to administer DLES sterile dressing change test-out to complete VAD education. Upon arrival, pt's caregiver (spouse) speaking with pt via cell phone video call. Pt's wife expressed that she called this morning to tell bedside RN that she would not make it to scheduled VAD education session and would not be available until Saturday; informed spouse that VAD education cannot be provided on weekends. Spouse expressed frustration that additional test-out is required for education completion, as she \"only touched her glove once\" to her abdomen and \"did it just fine after that\". Spouse adamant that she should not have to re-test dressing change, as she's \"done it 5 times now and it's just fine\". This writer re-educated both pt and spouse regarding importance of demonstrating sterile technique prior to discharge home, as well as risks for and possible outcomes of driveline infection.    Pt's spouse unable to come to facility tomorrow to complete education, and is unsure of her availability. Writer provided further education on expectations regarding preparation for discharge, and pt's inability to discharge home until VAD education is complete. Will follow up with spouse tomorrow to form definitive plan given barrier to discharge.    "

## 2024-10-10 NOTE — PLAN OF CARE
"Discharge Plan: home w/ spouse assist    Precautions: fall, LVAD    Current Status:  Bed Mobility: Viri rolling, Sup<>sit SBA w/ bedrail   Transfer: SBA w/ 4WW   Gait: Up to 200' SBA w/ 4WW   Stairs: 6\" x4 w/ joel railings, CGA  Balance: Stable dynamic sitting, SBA for unsupported dynamic standing     Outcome Measures:   5TSTS (modified, light UE push off): 32 seconds on 10/9  6MWT: max amb distance 150' on 10/9.     Assessment: Pt limited by symptomatic low PI today down to 1.6 only in standing. Pt at times demonstrates difficulty describing symptoms, potentially influenced by cognition.    Other Barriers to Discharge (DME, Family Training, etc):   3STE w/ railings. 10STI w/ chair lift available    Family training: no true hands-on training required as pt SBA-nearing Viri pending PI variability, caregiver connection Saturday 9:30-11.     4WW: needed prior to discharge  "

## 2024-10-11 ENCOUNTER — APPOINTMENT (OUTPATIENT)
Dept: OCCUPATIONAL THERAPY | Facility: CLINIC | Age: 74
DRG: 949 | End: 2024-10-11
Attending: PHYSICAL MEDICINE & REHABILITATION
Payer: MEDICARE

## 2024-10-11 ENCOUNTER — APPOINTMENT (OUTPATIENT)
Dept: SPEECH THERAPY | Facility: CLINIC | Age: 74
DRG: 949 | End: 2024-10-11
Attending: PHYSICAL MEDICINE & REHABILITATION
Payer: MEDICARE

## 2024-10-11 ENCOUNTER — APPOINTMENT (OUTPATIENT)
Dept: PHYSICAL THERAPY | Facility: CLINIC | Age: 74
DRG: 949 | End: 2024-10-11
Attending: PHYSICAL MEDICINE & REHABILITATION
Payer: MEDICARE

## 2024-10-11 LAB
ANION GAP SERPL CALCULATED.3IONS-SCNC: 7 MMOL/L (ref 7–15)
BUN SERPL-MCNC: 20.6 MG/DL (ref 8–23)
CALCIUM SERPL-MCNC: 8.2 MG/DL (ref 8.8–10.4)
CHLORIDE SERPL-SCNC: 99 MMOL/L (ref 98–107)
CREAT SERPL-MCNC: 0.72 MG/DL (ref 0.67–1.17)
EGFRCR SERPLBLD CKD-EPI 2021: >90 ML/MIN/1.73M2
GLUCOSE SERPL-MCNC: 93 MG/DL (ref 70–99)
HCO3 SERPL-SCNC: 26 MMOL/L (ref 22–29)
INR PPP: 2.38 (ref 0.85–1.15)
POTASSIUM SERPL-SCNC: 4.2 MMOL/L (ref 3.4–5.3)
SODIUM SERPL-SCNC: 132 MMOL/L (ref 135–145)

## 2024-10-11 PROCEDURE — 250N000013 HC RX MED GY IP 250 OP 250 PS 637: Performed by: INTERNAL MEDICINE

## 2024-10-11 PROCEDURE — 250N000013 HC RX MED GY IP 250 OP 250 PS 637: Performed by: PHYSICAL MEDICINE & REHABILITATION

## 2024-10-11 PROCEDURE — 85610 PROTHROMBIN TIME: CPT

## 2024-10-11 PROCEDURE — 128N000003 HC R&B REHAB

## 2024-10-11 PROCEDURE — 80048 BASIC METABOLIC PNL TOTAL CA: CPT | Performed by: INTERNAL MEDICINE

## 2024-10-11 PROCEDURE — 250N000013 HC RX MED GY IP 250 OP 250 PS 637: Performed by: NURSE PRACTITIONER

## 2024-10-11 PROCEDURE — 92507 TX SP LANG VOICE COMM INDIV: CPT | Mod: GN | Performed by: SPEECH-LANGUAGE PATHOLOGIST

## 2024-10-11 PROCEDURE — 97110 THERAPEUTIC EXERCISES: CPT | Mod: GP

## 2024-10-11 PROCEDURE — 36415 COLL VENOUS BLD VENIPUNCTURE: CPT

## 2024-10-11 PROCEDURE — 99232 SBSQ HOSP IP/OBS MODERATE 35: CPT | Mod: GC

## 2024-10-11 PROCEDURE — 97530 THERAPEUTIC ACTIVITIES: CPT | Mod: GO | Performed by: OCCUPATIONAL THERAPIST

## 2024-10-11 PROCEDURE — 97130 THER IVNTJ EA ADDL 15 MIN: CPT | Mod: GN | Performed by: SPEECH-LANGUAGE PATHOLOGIST

## 2024-10-11 PROCEDURE — 250N000013 HC RX MED GY IP 250 OP 250 PS 637

## 2024-10-11 PROCEDURE — 97129 THER IVNTJ 1ST 15 MIN: CPT | Mod: GN | Performed by: SPEECH-LANGUAGE PATHOLOGIST

## 2024-10-11 PROCEDURE — 99232 SBSQ HOSP IP/OBS MODERATE 35: CPT | Performed by: INTERNAL MEDICINE

## 2024-10-11 RX ORDER — ACETAMINOPHEN 325 MG/1
975 TABLET ORAL EVERY 8 HOURS PRN
Status: DISCONTINUED | OUTPATIENT
Start: 2024-10-11 | End: 2024-10-17 | Stop reason: HOSPADM

## 2024-10-11 RX ORDER — WARFARIN SODIUM 2.5 MG/1
2.5 TABLET ORAL
Status: COMPLETED | OUTPATIENT
Start: 2024-10-11 | End: 2024-10-11

## 2024-10-11 RX ADMIN — METHOCARBAMOL 750 MG: 750 TABLET ORAL at 08:03

## 2024-10-11 RX ADMIN — Medication 12.5 MG: at 13:13

## 2024-10-11 RX ADMIN — METHOCARBAMOL 750 MG: 750 TABLET ORAL at 20:05

## 2024-10-11 RX ADMIN — Medication 75 MG: at 07:09

## 2024-10-11 RX ADMIN — PANTOPRAZOLE SODIUM 40 MG: 40 TABLET, DELAYED RELEASE ORAL at 05:04

## 2024-10-11 RX ADMIN — ACETAMINOPHEN 975 MG: 325 TABLET, FILM COATED ORAL at 05:03

## 2024-10-11 RX ADMIN — LOSARTAN POTASSIUM 50 MG: 50 TABLET, FILM COATED ORAL at 08:03

## 2024-10-11 RX ADMIN — EMPAGLIFLOZIN 10 MG: 10 TABLET, FILM COATED ORAL at 08:03

## 2024-10-11 RX ADMIN — METHOCARBAMOL 750 MG: 750 TABLET ORAL at 13:13

## 2024-10-11 RX ADMIN — Medication 1 TABLET: at 20:06

## 2024-10-11 RX ADMIN — Medication 1 TABLET: at 08:03

## 2024-10-11 RX ADMIN — MAGNESIUM OXIDE TAB 400 MG (241.3 MG ELEMENTAL MG) 400 MG: 400 (241.3 MG) TAB at 08:03

## 2024-10-11 RX ADMIN — AMIODARONE HYDROCHLORIDE 200 MG: 200 TABLET ORAL at 08:03

## 2024-10-11 RX ADMIN — WARFARIN SODIUM 2.5 MG: 2.5 TABLET ORAL at 17:51

## 2024-10-11 RX ADMIN — Medication 10 MG: at 20:05

## 2024-10-11 RX ADMIN — Medication 12.5 MG: at 20:05

## 2024-10-11 RX ADMIN — ATORVASTATIN CALCIUM 80 MG: 80 TABLET, FILM COATED ORAL at 20:06

## 2024-10-11 RX ADMIN — LOSARTAN POTASSIUM 50 MG: 50 TABLET, FILM COATED ORAL at 20:05

## 2024-10-11 RX ADMIN — ESCITALOPRAM OXALATE 10 MG: 10 TABLET ORAL at 08:03

## 2024-10-11 RX ADMIN — GABAPENTIN 100 MG: 100 CAPSULE ORAL at 20:06

## 2024-10-11 ASSESSMENT — ACTIVITIES OF DAILY LIVING (ADL)
ADLS_ACUITY_SCORE: 39
ADLS_ACUITY_SCORE: 42
ADLS_ACUITY_SCORE: 40
ADLS_ACUITY_SCORE: 39
ADLS_ACUITY_SCORE: 42
ADLS_ACUITY_SCORE: 39
ADLS_ACUITY_SCORE: 42
ADLS_ACUITY_SCORE: 40
ADLS_ACUITY_SCORE: 39
ADLS_ACUITY_SCORE: 42
ADLS_ACUITY_SCORE: 42
ADLS_ACUITY_SCORE: 39
ADLS_ACUITY_SCORE: 39
ADLS_ACUITY_SCORE: 40
ADLS_ACUITY_SCORE: 39

## 2024-10-11 NOTE — PROGRESS NOTES
Cards 2 Brief Note    Cards 2 service called overnight for elevated MAP 92-94 with associated low flow 2.6. He was given a one time dose of 75 mg at 0700.     Suspect that Mr. Aguila has a degree of orthostatic hypotension and review of BPs demonstrate MAPs within goal majority of the time. We will need to be cautious with further afterload reduction to avoid low MAP and increased lightheadedness/dizziness.     Yesterday's echo demonstrated septum bowing into the RV with more dilated LV (LViDD 4.7 cm), IVC normal. Given several low flows by yesterday's interrogation would be cautious with further diuresis (also appears euvolemic/slightly hypovolemic by exam), but will attempt to further optimized afterload reduction. Resume hydralazine   12.5 mg TID. Will also discuss speed increase to 5200 rpm with VAD coordinators.       Sirisha Arias DNP, NP-C

## 2024-10-11 NOTE — PLAN OF CARE
"Goal Outcome Evaluation:      Plan of Care Reviewed With: patient    Overall Patient Progress: improvingOverall Patient Progress: improving    VS: BP (!) 141/71 (BP Location: Left arm)   Pulse 78   Temp 98  F (36.7  C) (Oral)   Resp 18   Ht 1.676 m (5' 6\")   Wt 62 kg (136 lb 9.6 oz)   SpO2 98%   BMI 22.05 kg/m       O2: SpO2 > 98 and stable on RA. Denies chest pain and SOB.    Output: Voids spontaneously without difficulty to bathroom / using beside urinal    Last BM: 10/11 denies abdominal discomfort. BS active / passing flatus.    Activity: Up with A1 walker and gait belt.    Skin: WDL except LVAD     Pain: Denied pain    CMS: Intact, AOx4.    Dressing:    Diet: Easy to chew diet. Denies nausea/vomiting.      LDA: LVAD    Equipment: personal belongings,    Plan:  Continue with plan of care. Call light within reach, pt able to make needs known.    Additional Info: Pt PI dropped during the shift. Doctor updated.Changes with some of the patient's blood pressure medication. The rest of the LVAD parameters WDL.            "

## 2024-10-11 NOTE — PROGRESS NOTES
"  [facility]   Acute Rehabilitation Unit  Daily Progress Note    INTERVAL HISTORY  Notes and labs reviewed over past 24 hours.  Overnight, flow ranged between 2.7-3.4, PI ranged from 5.1-5.7, MAPs ranged from 72-90. On-call hospitalist was contacted overnight due to low flow/ an elevated maps. Patient was asymptomatic at the time of these readings. Overnight cardiology service was contacted and recommended resumption of hydral 75mg which Duane did receive this AM.      Patient reports he is not feeling any lightheadedness today.  However, he notes that this generally occurs when he is active during therapies and is not present when supine in bed.  He is unsure if he drank the 3 16 ounce cups of fluid which he was recommended to drink daily by cardiology yesterday.  He denies any current chest pain,, vision changes, shortness of breath, abdominal pain.    ROS: 10 point ROS was assessed and was negative unless otherwise stated in HPI    Functionally,    With PT: 10/7  Current Status:  Bed Mobility: Maria Esther rolling, Sup<>sit SBA w/ bedrail   Transfer: SBA w/ 4WW   Gait: Up to 200' SBA w/ 4WW   Stairs: 6\" x4 w/ joel railings, CGA  Balance: Stable dynamic sitting, SBA for unsupported dynamic standing     With OT: 10/9  Current Status:  ADLs:  Mobility: S/SBA WWW if on wall power, nearing mod IND if on battery power  Grooming: IND standing at sink  Dressing: UB - set up LB - supervision with 4WW. Feet - supervision seated   Bathing: supervision with FB spongebath seated at EOB  Toileting: Supervision with 4WW  IADLs: TBD. Supportive spouse who can assist  Vision/Cognition: Pt scored 17/30 on SLUMs in 10/3/24. ACE-III on 10/8/24: 74/100 indicating cognitive deficits     With SLP: 10/9  Current Status:  Hearing: Increased volume  Vision: Readers  Communication: Motor speech, language intact  Cognition: Mild cognitive impairment- severe short term memory deficits  Swallow: Easy to chew solids " (7)/Thin liquids (0). Pt reported due to lack of dentition, only wants to eat chicken noodle soup and mashed potatoes. Clinical swallow evaluation not indicated at this time, MD can manage diet texture as it is related to dentition status only.     MEDICATIONS  Scheduled meds  Current Facility-Administered Medications   Medication Dose Route Frequency Provider Last Rate Last Admin    acetaminophen (TYLENOL) tablet 975 mg  975 mg Oral Q8H Toussaint Gonzalez, Paola, MD   975 mg at 10/11/24 0503    amiodarone (PACERONE) tablet 200 mg  200 mg Oral Daily Toussaint Gonzalez, Paola, MD   200 mg at 10/10/24 0818    atorvastatin (LIPITOR) tablet 80 mg  80 mg Oral QPM Toussaint Gonzalez, Paola, MD   80 mg at 10/10/24 2018    empagliflozin (JARDIANCE) tablet 10 mg  10 mg Oral Daily Sirisha Arias APRN CNP   10 mg at 10/10/24 0817    escitalopram (LEXAPRO) tablet 10 mg  10 mg Oral or Feeding Tube Daily Toussaint Gonzalez, Paola, MD   10 mg at 10/10/24 0817    gabapentin (NEURONTIN) capsule 100 mg  100 mg Oral or Feeding Tube At Bedtime Toussaint Gonzalez, Paola, MD   100 mg at 10/10/24 2017    [Held by provider] hydrALAZINE (APRESOLINE) half-tab 75 mg  75 mg Oral TID Rowena Us MD   75 mg at 10/11/24 0709    losartan (COZAAR) tablet 50 mg  50 mg Oral BID Toussaint Gonzalez, Paola, MD   50 mg at 10/10/24 2018    magnesium oxide (MAG-OX) tablet 400 mg  400 mg Oral Daily Toussaint Gonzalez, Paola, MD   400 mg at 10/10/24 0817    melatonin tablet 10 mg  10 mg Oral At Bedtime Toussaint Gonzalez, Paola, MD   10 mg at 10/10/24 2018    methocarbamol (ROBAXIN) tablet 750 mg  750 mg Oral TID Toussaint Gonzalez, Paola, MD   750 mg at 10/10/24 2018    multivitamin w/minerals (THERA-VIT-M) tablet 1 tablet  1 tablet Oral BID Toussaint Gonzalez, Paola, MD   1 tablet at 10/10/24 2017    pantoprazole (PROTONIX) EC tablet 40 mg  40 mg Oral QAM AC Toussaint Gonzalez, Paola, MD   40 mg at 10/11/24 0504    Warfarin Dose Required Daily -  "Pharmacist Managed  1 each Oral See Admin Instructions Toussaint Gonzalez, Paola, MD           PRN meds:  Current Facility-Administered Medications   Medication Dose Route Frequency Provider Last Rate Last Admin    acetaminophen (TYLENOL) tablet 650 mg  650 mg Oral Q4H PRN Toussaint Gonzalez, Paola, MD        bisacodyl (DULCOLAX) suppository 10 mg  10 mg Rectal Daily PRN Toussaint Gonzalez, Paola, MD        lidocaine (LMX4) cream   Topical Q1H PRN Toussaint Gonzalez, Paola, MD        lidocaine 1 % 0.1-1 mL  0.1-1 mL Other Q1H PRN Toussaint Gonzalez, Paola, MD        naloxone (NARCAN) injection 0.2 mg  0.2 mg Intravenous Q2 Min PRN Toussaint Gonzalez, Paola, MD        Or    naloxone (NARCAN) injection 0.4 mg  0.4 mg Intravenous Q2 Min PRN Toussaint Gonzalez, Paola, MD        Or    naloxone (NARCAN) injection 0.2 mg  0.2 mg Intramuscular Q2 Min PRN Toussaint Gonzalez, Paola, MD        Or    naloxone (NARCAN) injection 0.4 mg  0.4 mg Intramuscular Q2 Min PRN Toussaint Gonzalez, Paola, MD        ondansetron (ZOFRAN ODT) ODT tab 4 mg  4 mg Oral Q6H PRN Toussaint Gonzalez, Paola, MD        oxyCODONE (ROXICODONE) tablet 5 mg  5 mg Oral Q4H PRN Toussaint Gonzalez, Paola, MD        Patient is already receiving anticoagulation with heparin, enoxaparin (LOVENOX), warfarin (COUMADIN)  or other anticoagulant medication   Does not apply Continuous PRN Toussaint Gonzalez, Paola, MD        polyethylene glycol (MIRALAX) Packet 17 g  17 g Oral or Feeding Tube BID PRN Toussaint Gonzalez, Paola, MD   17 g at 10/07/24 2159    senna-docusate (SENOKOT-S/PERICOLACE) 8.6-50 MG per tablet 2 tablet  2 tablet Oral or Feeding Tube BID PRN Toussaint Gonzalez, Paola, MD   2 tablet at 10/10/24 0817    sodium chloride (PF) 0.9% PF flush 3 mL  3 mL Intracatheter q1 min prn Toussaint Gonzalez, Paola, MD         PHYSICAL EXAM  BP (!) 141/71 (BP Location: Left arm)   Pulse 72   Temp 98.2  F (36.8  C) (Oral)   Resp 18   Ht 1.676 m (5' 6\")   Wt 62 " kg (136 lb 9.6 oz)   SpO2 97%   BMI 22.05 kg/m    General: in no acute distress, conversational and alert, resting comfortably in bed  HEENT: atraumatic, nares clear, conjunctiva white  Pulmonary: on room air, lungs clear to auscultation bilaterally  Cardiovascular: LVAD hum. Well perfused peripherally   Abdominal: soft, non-tender to palpation, non-distended  Extremities: warm peripherally, no edema in BLEs  Skin: warm, dry, without erythema, ecchymosis, or rash noted  MSK: no BLE edema noted, calves non-tender to palpation  Neuro: conjugate gaze, speech non-dysarthric    LABS  Results for orders placed or performed during the hospital encounter of 10/05/24 (from the past 24 hour(s))   Echo Complete   Result Value Ref Range    LVEF  15-20% (severely reduced)     Narrative    [id number]  [id number]  [id number]  [id number]^REX^SHANICE^LANA     [facility],[place]  Echocardiography Laboratory  [address] [place] [id number]     Name: JOHNSON, DUANE C  MRN: [id number]  : 1950  Study Date: 10/10/2024 01:56 PM  Age: 74 yrs  Gender: Male  Patient Location: [facility]  Reason For Study: Heart Failure  Ordering Physician: SHANICE GOODMAN  Performed By: Ag Larios     BSA: 1.7 m2  Height: 66 in  Weight: 142 lb  ______________________________________________________________________________  Procedure  LVAD Echocardiogram with two-dimensional, color and spectral Doppler  performed.  ______________________________________________________________________________  Interpretation Summary  HM3 LVAD at 5200 RPM.     Left ventricular function is decreased. The ejection fraction is 15-20%  (severely reduced).  LVAD inflow and outflow cannula Dopplers are normal.  Septum bows towards the RV.  Global right ventricular function is moderately reduced.  Aortic valve opens with each cardiac cycle.  The inferior vena cava is normal.     This study was compared with the study from 10/1/24. Aortic valve is  now  opening with each beat.  ______________________________________________________________________________  Left Ventricle  Septum bows towards the RV. Left ventricular function is decreased. The  ejection fraction is 15-20% (severely reduced). LVAD cannula was seen in the  expected anatomic position in the LV apex. LVAD inflow cannula Doppler is  normal. Outflow graft is not visualized. LVAD outflow graft Doppler is normal.     Right Ventricle  Global right ventricular function is moderately reduced. A pacemaker lead is  noted in the right ventricle.     Mitral Valve  The mitral valve is normal. Trace mitral insufficiency is present.     Aortic Valve  Aortic valve is normal in structure and function. The aortic valve is  tricuspid. Aortic valve opens with each cardiac cycle.     Tricuspid Valve  The tricuspid valve is normal. Trace to mild tricuspid insufficiency is  present. Estimated pulmonary artery systolic pressure is 18 mmHg plus right  atrial pressure. Pulmonary artery systolic pressure is normal.     Pulmonic Valve  The pulmonic valve is normal. Trace pulmonic insufficiency is present.     Vessels  The inferior vena cava is normal.     Pericardium  No pericardial effusion is present.     Compared to Previous Study  This study was compared with the study from 10/1/24 .  ______________________________________________________________________________  MMode/2D Measurements & Calculations  IVSd: 1.1 cm     LVIDd: 4.7 cm  LVIDs: 4.0 cm  LVPWd: 1.2 cm  FS: 14.2 %  LV mass(C)d: 202.3 grams  LV mass(C)dI: 117.0 grams/m2  RWT: 0.53     Doppler Measurements & Calculations  PA V2 max: 92.9 cm/sec  PA max PG: 3.5 mmHg  PA acc time: 0.11 sec  TR max francisco j: 183.0 cm/sec  TR max P.4 mmHg     ______________________________________________________________________________  Report approved by: Eusebio Posey 10/10/2024 03:44 PM         INR   Result Value Ref Range    INR 2.38 (H) 0.85 - 1.15   Basic metabolic  panel   Result Value Ref Range    Sodium 132 (L) 135 - 145 mmol/L    Potassium 4.2 3.4 - 5.3 mmol/L    Chloride 99 98 - 107 mmol/L    Carbon Dioxide (CO2) 26 22 - 29 mmol/L    Anion Gap 7 7 - 15 mmol/L    Urea Nitrogen 20.6 8.0 - 23.0 mg/dL    Creatinine 0.72 0.67 - 1.17 mg/dL    GFR Estimate >90 >60 mL/min/1.73m2    Calcium 8.2 (L) 8.8 - 10.4 mg/dL    Glucose 93 70 - 99 mg/dL       ASSESSMENT AND PLAN  Duane C Johnson is a 74 year old right hand dominant male with a PMH of HFrEF 2/2 ICM with LVEF 20-30%, atrial fibrillation, 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 Due to worsening functional limitations due to heart failure, patient was worked up for VAD while inpatient. Patient is now s/p implantation of HeartMate 3 LVAD on 9/18/2024. His post-operative course was complicated by Afib, pain, anxiety, insomnia, and transient right eye visual deficit. He has also required medical mgmt of his acute blood loss anemia, fluid overload, hyperglycemia, hyponatremia and B pleural effusions. He is now medically stable, admitted to rehab on 10/05/2024 in setting of impaired strength, impaired balance and impaired activity tolerance.    Updates Today:  - Orthostatic Hypotension: Continued lability of pulse index and flow. Likely in setting of orthostasis and ongoing medication optimization. Will proceed with hydralazine 12.5mg TID as recommended by cards II team. Will need to monitor volume status and hydralazine dosage over the weekend as this has been a limiting factor in therapy participation.   - Disposition: Tentatively plannon on 10/14 discharge following LVAD training. This will be dependent on if patient tolerates therapies over the weekend.      Admission to acute inpatient rehab 10/05/2024.    Impairment group code: 09 Cardiac: s/p Heartmate III LVAD placement in setting of acute on chronic systolic heart  failure d/t ischemic cardiomyopathy     PT, OT and SLP 60 minutes of each, 6 days a week for 12 days,  in addition to rehab nursing and close management of physiatrist.    Impairment of ADL's: OT for 6 days a week for 12 days, to work on ADL re-training such as grooming, self cares and bathing.   Impairment of mobility:  PT  for 6 days a week for 12 days, to work on neuromuscular re-education focusing on strength, balance, coordination, and endurance.    Impairment of cognition/language/swallow:  SLP for 6 days a week for 12 days, to work on cognitive and linguistic deficits.  Rehab RN for med administration, bowel regimen, glucose monitoring and wound care.       Medical Conditions  # Acute on chronic systolic heart failure secondary to ICM   #HFrEF  s/p HM3 LVAD as DT on 9/18/2024  # CAD s/p PCI  # History of STEMI, s/p PCI to LAD in 2023   # s/p ICD 5/8/2024, hxVT/VF arrest   Stage D. NYHA Class III. Limited TTE 9/27 with LVIDd 4.0 cm, Closed AoV, no AI, moderately reduced RV, normal IVC. TTE (10/10) demonstrates septum bowing into RV. LV measures 4.7 cm. Intermittently has been triggering LVAD alarms, likely dt hypovolemia.   -Continue 50 mg losartan BID  -Hydralazine 50 mg increased to 75 mg TID. Changed to 12.5 mg TID on 10/11.    -Continue Empagliflozin 10 mg.  -Continue Anticoagulation: warfarin dosing per pharmacy, INR goal 2-3.   Antiplatelet: ASA not indicated in LVAD population, > 6 months s/p STEMI     # Hyperlipidemia  -Continue atorvastatin 80mg      # History of VT/VF arrest 2023  # AFib s/p DCCV 9/2024 and again 9/30/2024  - Successfully cardioverted in early September for AF. Since then EKG suggested AF on 9/21. The patient was started on hep gtt 9/21, but unclear if was in AF before this. S/p MELBA and DCCV on 9/30 with conversion to sinus rhythm   -Continue amiodarone 200 mg daily   -Continue AC as above     # Visual changes, resolved.   # Generalized Weakness  Stroke code call 9/29 for visual  changes - right eye with decreased upper half of vision and bluish haze. Resolved after 30 minutes. Seen by opthalmology and neuro. Negative head CT and CTA head and neck. Additionally had stroke code activated on 10/9 due to transient left upper extremity weakness. This resolved spontaneously and CT head returned negative.   - Already on A/C     # Iron Deficiency Anemia  - Hgb 8.4 10/4. No overt bleeds, slow downtrend since surgery. LDH downtrending.   - Iron studies suggestive of RADHA. Recieved IV venofer x5 days     #Pain  No pain on admission  -Continue acetaminophen TID< can consider changing to PRN  -Continue gabapentin 100 mg bedtime     # Hypervolemic hyponatremia  Ranging 128-133  - Holding PTA Bumex 0.5 mg daily  - Continue empagliflozin 10 mg daily      # Bilateral pleural effusions, Resolved  -Chest tube removed 9/30.   - Left thoracentesis done 10/1, 1 L removed, R thoracentesis done 10/3, 1 L removed.   -Continue incentive spirometer     Adjustment to disability:  Clinical psychology to eval and treat. Currently on escitalopram.   FEN: 0-Thin and 6-Soft and bite sized   Bowel: continent  Bladder: continent  DVT Prophylaxis: on warfarin  GI Prophylaxis: pantoprazole. Mg oxide QID for GERD  Code: Full code  Disposition: house with wife  TAINA:  Tentative discontinue 10/14.  Rehab prognosis:  Anticipate w/ intensive PT/OT, skilled rehab nursing cares, and medical mgmt by PMR and HOSP providers, pt will achieve a level of mod IND for bed mobility, transfers, gait, stairs, ADLs, and be able to safely and independently manage his Heartmate III LVAD.   Follow up Appointments on Discharge:   PCP  Ophthalmology  PM&R   Outpatient therapies  Cardiology    Seen and discussed with Dr. Fountain, PM&R staff physician     --   Roberto Carlos Braswell MD  [facility] PM&R PGY-2  Pager: [phone]

## 2024-10-11 NOTE — PROGRESS NOTES
"[employer] [employer]    Medicine Progress Note - Hospitalist Service    Date of Admission:  10/5/2024    Assessment & Plan   Duane C Johnson is a 74 year old male pmhx of anterior STEMI s/p PCI to the pLAD on 10/26/23 with a VT/VF arrest the next day (10/27) Placed on amiodarone and discharged 11/03/23, ICD was not indicated as this was within 48h of his MI. His EF prior to discharge was 20-30% with a large area of akinesis in the LAD, placed on apixaban due to this (Apixaban dcd on 7/5/24).   Had multiple admissions for HF exacerbation     Admitted on 8/30/24 for CHF exacerbation with BNP 16k. CPX and RHC showed significant functional limitations due to heart failue. Underwent HM3 LVAD on 9/18/24 needed mediastinal re-exploration for post operative hemorrhage.   Transferred to ARU on 10/5 for rehab      #Weakness  #headache,  tingling in fingers  -10/9  post therapy that resolved after got back to bed. Code stroke cancelled but then cancelled as symptoms resolved and had no focal deficits   - discussed with  cardiology  and gave 500 ml fluid bolus 10/9  - head CT was negative for acute intracranial process   -  10/9 EKG  sinus  - monitor for infection  , no leukocytosis, no fever noted   - 10/10 after got up post BM had some left arm \" clumsiness\"  now gone, did have headache now also gone. He is not the best historian  -states he heard alarm around 4 AM, this AM had MAP 72 then post medications 60-- 58, flow 3.7 PI 3.1   - hydralazin held after AM dose 10/9 , see bellow      #hyponatremia  - Na 131 10/9-->128 ---132     Weights 10/5 66.8---64.1 ( bedside scale)  --63.3 ( standing scale) --64.8 ---61.96 10/10 on standing scale       # Acute on chronic systolic heart failure secondary to ICM   #HFrEF  s/p HM3 LVAD as DT on 9/18/2024  # CAD s/p PCI  # History of STEMI, CAD status post  PCI to LAD in 2023   # s/p ICD 5/8/2024 with history VT/VF arrest 2023   -Stage D. NYHA Class " III.  - gets some shortness of breath  with therapy but not at rest    - had low PI 10/9 ad cardiology  was notified, monitor closely and call cardiology  if remain < 2  . Has had PI 1.6 7:50 AM with thapy and post recheck was 4.4 then again had 1.7  9;42  then 2.8 at 10    - goal MAP 65-85      -  10/9  EKG  sinus  rhythm   - 10/9 500 ml fluid bolus   - speed decreased to 5100  Fluid status: Bumex to 0.5 mg daily. ( Has been on hold )   ACEi/ARB/ARNi: 50 mg losartan BID  Vasodilator: Hydralazine 50 mg TID increased to 75 mg tid 10/7  and held form 10/9  and restarted 12.5 mg TID 10/11   Received extra dose of hydralazine 25 mg 10/7    BB: Recent LVAD, not on any   Aldosterone antagonist deferred while other medical therapy is prioritized  SGLT2i:  Empagliflozin 10 mg   SCD prophylaxis: ICD  MAP: goal 65-85,   LDH trends: 230, stable  Anticoagulation: warfarin dosing per pharmacy, INR goal 2-3,   Antiplatelet: ASA not indicated in LVAD population, > 6 months s/p STEMI       # Low flow alarms  # HTN  Multiple low flow alarms 10/1, none reported 10/2. Verified that doppler measure is MAP vs. SBP.   - Speed optimization echo performed 10/1, speed decreased to 5100  - Losartan 50 mg BID  - Hydralazine 50 mg TID--> 75 mg TID  10/7 ---12,5 mg TID 10/11-  - continue to  monitor closely   -     - echo 10/10:  Left ventricular function is decreased. The ejection fraction is 15-20%  (severely reduced).  LVAD inflow and outflow cannula Dopplers are normal.  Septum bows towards the RV.  Global right ventricular function is moderately reduced.  Aortic valve opens with each cardiac cycle.  The inferior vena cava is normal.       # History of VT/VF arrest 2023  # AFib s/p DCCV 9/5/2024 and again 9/30/2024  - Successfully cardioverted in early September for AF. Since then EKG suggested AF on 9/21. Tele is only available from as early as 9/22. The patient was started on hep gtt 9/21, but unclear if was in AF before this. While the  patient was on hep gtt until INR was therapeutic, it is not possible to assess rhythm prior to 9/21. Systemic AC was off on 9/18-9/20. S/p MELBA and DCCV on 9/30 with conversion to sinus rhythm   - continue amiodarone 200 mg daily      # Visual changes, resolved.   -Stroke code call 9/29 for visual changes - right eye with decreased upper half of vision and bluish haze. Resolved after 30 minutes. Seen by opthalmology and neuro. Negative head CT and CTA head and neck. He has had 3 episodes which last about 5 minutes before resolving completely.   - Ophthalmology and Neuro, signed off  - Already on A/C  - Continue to monitor for changes     # Hypervolemic hyponatremia  Na 132, ranging 128-133  - encourage PO intake  - Bumex 0.5 mg daily now on hold , last received 10/5  , discussed with   cardiology and restart if start gaining weight or signs of  volume increase     - did get 500 ml fluid bolus 10/9      # Anemia  - Hg  stable in 8 range   - Iron studies suggestive of RADHA. Received IV venofer x5 days last day 10/2     # Bilateral pleural effusions  - Chest tube removed 9/30.   - Left thoracentesis done 10/1, 1 L removed, R thoracentesis done 10/3, 1 L removed.       #History localized prostate cancer, [place] 4+7=7   - was not felt to be surgical candidate die to his heart disease   - received  Lupron q 3 months seems  received last dose this  year , radiation therapy     10/ 8-9-10-11 discussed with  cardiology   also discussed with  ARU team , Rns       Diet: Room Service  Combination Diet Regular Diet; Easy to Chew (level 7); Thin Liquids (level 0)  Snacks/Supplements Adult: Ensure Enlive; With Meals  Snacks/Supplements Adult: Magic Cup; With Meals  Snacks/Supplements Adult: Other; Cottage cheese + strawberry Greek yogurt TID with meals; With Meals    DVT Prophylaxis: Warfarin  Michael Catheter: Not present  Lines: None     Cardiac Monitoring: None  Code Status: Full Code      Clinically Significant Risk Factors          # Hyponatremia: Lowest Na = 128 mmol/L in last 2 days, will monitor as appropriate       # Hypoalbuminemia: Lowest albumin = 2.6 g/dL at 10/9/2024  7:28 PM, will monitor as appropriate        # End stage heart failure: Ventricular assist device (VAD) present           # Severe Malnutrition: based on nutrition assessment    # Financial/Environmental Concerns:     # ICD device present          Jenniffer Rowland MD  Hospitalist Service  [facility]  Securely message with REQQI (more info)  Text page via [facility] Paging/Directory   ______________________________________________________________________    Interval History   In bed, has had some low PI, flows  OK, MAPS in range. Tobar snot seem to be feeling worse, still weak but not worse and not localizing. No worsening shortness of breath      Physical Exam   Vital Signs: Temp: 98.2  F (36.8  C) Temp src: Oral BP: (!) 141/71 Pulse: 72   Resp: 18 SpO2: 97 % O2 Device: None (Room air) (Simultaneous filing. User may be unaware of other data.)    Weight: 136 lbs 9.6 oz  General appearence: awake alert  in  no apparent distress    HEENT: EOMI, PEARLA, sclera nonicteric,  dry mucus membranes,   NECK : supple  RESPIRATORY: lungs clear  , LVAD sounds on left    CARDIOVASCULAR:  LVAD sounds   GASTROINTESTINAL:soft, non-distended , non-tender , + bowel sounds, also LVAD sounds     SKIN: warm and dry, no mottling noted   NEUROLOGIC; awake alert and oriented, no focal deficits noted   EXTREMITIES: no clubbing, cyanosis or edema ,      Data     I have personally reviewed the following data over the past 24 hrs:    N/A  \   N/A   / N/A     132 (L) 99 20.6 /  93   4.2 26 0.72 \     INR:  2.38 (H) PTT:  N/A   D-dimer:  N/A Fibrinogen:  N/A       Imaging results reviewed over the past 24 hrs:   Recent Results (from the past 24 hour(s))   Echo Complete   Result Value    LVEF  15-20% (severely reduced)    Narrative     [id number]  Critical access hospital  [id number]  [id number]^REX^SHANICE^LANA     [facility],[place]  Echocardiography Laboratory  [address] [place] [id number]     Name: JOHNSON, DUANE C  MRN: [id number]  : 1950  Study Date: 10/10/2024 01:56 PM  Age: 74 yrs  Gender: Male  Patient Location: [facility]  Reason For Study: Heart Failure  Ordering Physician: SHANICE GOODMAN  Performed By: Ag Larios     BSA: 1.7 m2  Height: 66 in  Weight: 142 lb  ______________________________________________________________________________  Procedure  LVAD Echocardiogram with two-dimensional, color and spectral Doppler  performed.  ______________________________________________________________________________  Interpretation Summary  HM3 LVAD at 5200 RPM.     Left ventricular function is decreased. The ejection fraction is 15-20%  (severely reduced).  LVAD inflow and outflow cannula Dopplers are normal.  Septum bows towards the RV.  Global right ventricular function is moderately reduced.  Aortic valve opens with each cardiac cycle.  The inferior vena cava is normal.     This study was compared with the study from 10/1/24. Aortic valve is now  opening with each beat.  ______________________________________________________________________________  Left Ventricle  Septum bows towards the RV. Left ventricular function is decreased. The  ejection fraction is 15-20% (severely reduced). LVAD cannula was seen in the  expected anatomic position in the LV apex. LVAD inflow cannula Doppler is  normal. Outflow graft is not visualized. LVAD outflow graft Doppler is normal.     Right Ventricle  Global right ventricular function is moderately reduced. A pacemaker lead is  noted in the right ventricle.     Mitral Valve  The mitral valve is normal. Trace mitral insufficiency is present.     Aortic Valve  Aortic valve is normal in structure and function. The aortic valve is  tricuspid. Aortic valve opens with each cardiac cycle.      Tricuspid Valve  The tricuspid valve is normal. Trace to mild tricuspid insufficiency is  present. Estimated pulmonary artery systolic pressure is 18 mmHg plus right  atrial pressure. Pulmonary artery systolic pressure is normal.     Pulmonic Valve  The pulmonic valve is normal. Trace pulmonic insufficiency is present.     Vessels  The inferior vena cava is normal.     Pericardium  No pericardial effusion is present.     Compared to Previous Study  This study was compared with the study from 10/1/24 .  ______________________________________________________________________________  MMode/2D Measurements & Calculations  IVSd: 1.1 cm     LVIDd: 4.7 cm  LVIDs: 4.0 cm  LVPWd: 1.2 cm  FS: 14.2 %  LV mass(C)d: 202.3 grams  LV mass(C)dI: 117.0 grams/m2  RWT: 0.53     Doppler Measurements & Calculations  PA V2 max: 92.9 cm/sec  PA max PG: 3.5 mmHg  PA acc time: 0.11 sec  TR max francisco j: 183.0 cm/sec  TR max P.4 mmHg     ______________________________________________________________________________  Report approved by: Eusebio Posye 10/10/2024 03:44 PM

## 2024-10-11 NOTE — PROGRESS NOTES
Called pt's spouse to arrange final VAD education session for sterile DLES dressing change test-out on Monday. Scheduled for 1pm on Monday 10/14. Requested that Melissa page the VAD coordinator on call on Monday if this plan were to change.

## 2024-10-11 NOTE — PROGRESS NOTES
Brief Cross Cover Note:     RN messaged w/ concerns that LVAD flow rate low at 2.7, w/ PI's fluctating btw 6-8 (within appropriate limits). Reported patient asymptomatic. Most recent BP elevated at 141/71 documented (MAP 94). Messaged HeartFailure overnight cardiology, awaiting response.     Echo 10/10 reviewed:   Left ventricular function is decreased. The ejection fraction is 15-20%  (severely reduced).  LVAD inflow and outflow cannula Dopplers are normal.  Septum bows towards the RV.  Global right ventricular function is moderately reduced.  Aortic valve opens with each cardiac cycle.  The inferior vena cava is normal.    Rowena Us MD      Update:   RN reporting another issue w/ LVAD alarm reporting Low flow. MAP >90 reported. Discussed w/ Cardiology, recommend resuming hydralazine PO dose at this time. RN updated on plan.     Rowena Us MD  Internal Medicine-Pediatric Hospitalist  [facility]

## 2024-10-11 NOTE — PLAN OF CARE
Discharge Planning:     Patient's PI has been labile as of late with patient experiencing symptoms. Original discharge date of 10/12/24 has been changed to 10/14 at this time. Resident updated spouse of discharge date change yesterday.    Writer communicated with LVAD coordinator, Yvette Vazquez, yesterday and was notified that patient's spouse declined to come in to test out on the LVAD dressing change. Yvette stated that patient's spouse would come in on Monday, 10/14. She is aware that patient's discharge date changed.     Writer updated home care that patient's discharge date delayed at this time.     Tamiko Pantoja, RN, BSN, CRRN  ARC Patient Care Supervisor  Acute Rehabilitation Unit  PH: [phone]  Pager: [phone]

## 2024-10-11 NOTE — PLAN OF CARE
Goal Outcome Evaluation:           Overall Patient Progress: improvingOverall Patient Progress: improving    Outcome Evaluation: Patient is alert and oriented but forgetful. Denied pain, headache, dizziness, CP or SOB. A-1 SPT to BSC. LVAD parameter WNL except PI fluctuated to low when patient is up to commode, and back up to normal when patient in bed. MAP was 72 via doppler. Easy to chew/ level 7/thin good appetite. Take pills whole. No care concern at this time. Call light is with in reach alarm is on.

## 2024-10-11 NOTE — PLAN OF CARE
"Discharge Plan: home w/ spouse assist    Precautions: fall, LVAD- monitor for low PI's     Current Status:  Bed Mobility: Viri rolling, Sup<>sit SBA w/ bedrail   Transfer: SBA w/ 4WW   Gait: Up to 200' SBA w/ 4WW   Stairs: 6\" x4 w/ joel railings, CGA  Balance: Stable dynamic sitting, SBA for unsupported dynamic standing     Outcome Measures:   5TSTS (modified, light UE push off): 32 seconds on 10/9  6MWT: max amb distance 150' on 10/9.     Assessment:Pt again limited in session by symptomatic low PI, down to 1.5 noted in both sitting or standing today. Unable to participate in OOR therapy. Anticipate once pt demonstrates consistent upright tolerance and stable PI's will progress quickly to Viri. Called spouse to cancel caregiver connection training for tomorrow given pt low tolerance for upright, rescheduled to Monday after dressing change training at 2pm.     Other Barriers to Discharge (DME, Family Training, etc):   3STE w/ railings. 10STI w/ chair lift available    Family training: no true hands-on training required as pt SBA-nearing Viri pending PI variability, caregiver connection Saturday 9:30-11.     4WW: needed prior to discharge  "

## 2024-10-11 NOTE — PLAN OF CARE
Goal Outcome Evaluation:    Pt is alert and oriented. Can take pills whole. Continent of B&B. LBM 10/10. Ax1 walker. Denied pain, SOB, CP, dizziness, and n/t. MAP was 94 and 92. Paged Hospitalist regarding MAP and LVAD numbers outside of parameters. See flowsheet. Cardiology consulted and Hydralazine restarted. MAP prior to giving Hydralazine at the end of the shift was 84. Pt denied any symptoms throughout the shift. Oncoming RN notified. Call light within reach and bed alarms on. Will continue with POC.

## 2024-10-11 NOTE — TELEPHONE ENCOUNTER
Called patient re: appointment confirmation.     As of now, patient is expecting to come to appointment. Patient is still currently inpatient at [facility] and expects to discharge early next week.     Writer to check in with patient next week to confirm discharge. Patient agreeable to plan.      Ellie Held Neuro[facility] Facilitator on 10/11/2024 at 2:17 PM

## 2024-10-12 ENCOUNTER — APPOINTMENT (OUTPATIENT)
Dept: SPEECH THERAPY | Facility: CLINIC | Age: 74
DRG: 949 | End: 2024-10-12
Attending: PHYSICAL MEDICINE & REHABILITATION
Payer: MEDICARE

## 2024-10-12 ENCOUNTER — APPOINTMENT (OUTPATIENT)
Dept: PHYSICAL THERAPY | Facility: CLINIC | Age: 74
DRG: 949 | End: 2024-10-12
Attending: PHYSICAL MEDICINE & REHABILITATION
Payer: MEDICARE

## 2024-10-12 ENCOUNTER — APPOINTMENT (OUTPATIENT)
Dept: OCCUPATIONAL THERAPY | Facility: CLINIC | Age: 74
DRG: 949 | End: 2024-10-12
Attending: PHYSICAL MEDICINE & REHABILITATION
Payer: MEDICARE

## 2024-10-12 LAB
ANION GAP SERPL CALCULATED.3IONS-SCNC: 7 MMOL/L (ref 7–15)
BUN SERPL-MCNC: 19.6 MG/DL (ref 8–23)
CALCIUM SERPL-MCNC: 8.6 MG/DL (ref 8.8–10.4)
CHLORIDE SERPL-SCNC: 100 MMOL/L (ref 98–107)
CREAT SERPL-MCNC: 0.73 MG/DL (ref 0.67–1.17)
EGFRCR SERPLBLD CKD-EPI 2021: >90 ML/MIN/1.73M2
GLUCOSE SERPL-MCNC: 84 MG/DL (ref 70–99)
HCO3 SERPL-SCNC: 26 MMOL/L (ref 22–29)
HOLD SPECIMEN: NORMAL
INR PPP: 2.24 (ref 0.85–1.15)
POTASSIUM SERPL-SCNC: 4.5 MMOL/L (ref 3.4–5.3)
SODIUM SERPL-SCNC: 133 MMOL/L (ref 135–145)

## 2024-10-12 PROCEDURE — 250N000013 HC RX MED GY IP 250 OP 250 PS 637: Performed by: NURSE PRACTITIONER

## 2024-10-12 PROCEDURE — 99232 SBSQ HOSP IP/OBS MODERATE 35: CPT | Mod: GC

## 2024-10-12 PROCEDURE — 97110 THERAPEUTIC EXERCISES: CPT | Mod: GP

## 2024-10-12 PROCEDURE — 99232 SBSQ HOSP IP/OBS MODERATE 35: CPT | Performed by: INTERNAL MEDICINE

## 2024-10-12 PROCEDURE — 85610 PROTHROMBIN TIME: CPT

## 2024-10-12 PROCEDURE — 250N000013 HC RX MED GY IP 250 OP 250 PS 637

## 2024-10-12 PROCEDURE — 36415 COLL VENOUS BLD VENIPUNCTURE: CPT

## 2024-10-12 PROCEDURE — 128N000003 HC R&B REHAB

## 2024-10-12 PROCEDURE — 80048 BASIC METABOLIC PNL TOTAL CA: CPT | Performed by: INTERNAL MEDICINE

## 2024-10-12 PROCEDURE — 250N000013 HC RX MED GY IP 250 OP 250 PS 637: Performed by: PHYSICAL MEDICINE & REHABILITATION

## 2024-10-12 PROCEDURE — 97130 THER IVNTJ EA ADDL 15 MIN: CPT | Mod: GN

## 2024-10-12 PROCEDURE — 97535 SELF CARE MNGMENT TRAINING: CPT | Mod: GO | Performed by: OCCUPATIONAL THERAPIST

## 2024-10-12 PROCEDURE — 97129 THER IVNTJ 1ST 15 MIN: CPT | Mod: GN

## 2024-10-12 PROCEDURE — 258N000003 HC RX IP 258 OP 636: Performed by: PHYSICAL MEDICINE & REHABILITATION

## 2024-10-12 RX ORDER — SODIUM CHLORIDE 9 MG/ML
INJECTION, SOLUTION INTRAVENOUS
Status: COMPLETED
Start: 2024-10-12 | End: 2024-10-12

## 2024-10-12 RX ORDER — WARFARIN SODIUM 2.5 MG/1
2.5 TABLET ORAL
Status: COMPLETED | OUTPATIENT
Start: 2024-10-12 | End: 2024-10-12

## 2024-10-12 RX ADMIN — AMIODARONE HYDROCHLORIDE 200 MG: 200 TABLET ORAL at 08:11

## 2024-10-12 RX ADMIN — Medication 1 TABLET: at 20:02

## 2024-10-12 RX ADMIN — Medication 12.5 MG: at 13:08

## 2024-10-12 RX ADMIN — LOSARTAN POTASSIUM 50 MG: 50 TABLET, FILM COATED ORAL at 08:12

## 2024-10-12 RX ADMIN — EMPAGLIFLOZIN 10 MG: 10 TABLET, FILM COATED ORAL at 08:11

## 2024-10-12 RX ADMIN — METHOCARBAMOL 750 MG: 750 TABLET ORAL at 08:11

## 2024-10-12 RX ADMIN — LOSARTAN POTASSIUM 50 MG: 50 TABLET, FILM COATED ORAL at 20:02

## 2024-10-12 RX ADMIN — Medication 12.5 MG: at 08:11

## 2024-10-12 RX ADMIN — PANTOPRAZOLE SODIUM 40 MG: 40 TABLET, DELAYED RELEASE ORAL at 05:35

## 2024-10-12 RX ADMIN — MAGNESIUM OXIDE TAB 400 MG (241.3 MG ELEMENTAL MG) 400 MG: 400 (241.3 MG) TAB at 08:11

## 2024-10-12 RX ADMIN — ESCITALOPRAM OXALATE 10 MG: 10 TABLET ORAL at 08:11

## 2024-10-12 RX ADMIN — GABAPENTIN 100 MG: 100 CAPSULE ORAL at 20:02

## 2024-10-12 RX ADMIN — ATORVASTATIN CALCIUM 80 MG: 80 TABLET, FILM COATED ORAL at 20:02

## 2024-10-12 RX ADMIN — Medication 10 MG: at 20:02

## 2024-10-12 RX ADMIN — Medication 1 TABLET: at 08:11

## 2024-10-12 RX ADMIN — Medication 500 ML: at 12:31

## 2024-10-12 RX ADMIN — Medication 12.5 MG: at 20:02

## 2024-10-12 RX ADMIN — METHOCARBAMOL 750 MG: 750 TABLET ORAL at 13:08

## 2024-10-12 RX ADMIN — WARFARIN SODIUM 2.5 MG: 2.5 TABLET ORAL at 17:57

## 2024-10-12 RX ADMIN — SODIUM CHLORIDE 500 ML: 9 INJECTION, SOLUTION INTRAVENOUS at 12:31

## 2024-10-12 RX ADMIN — METHOCARBAMOL 750 MG: 750 TABLET ORAL at 20:02

## 2024-10-12 ASSESSMENT — ACTIVITIES OF DAILY LIVING (ADL)
ADLS_ACUITY_SCORE: 42
ADLS_ACUITY_SCORE: 41
ADLS_ACUITY_SCORE: 41
ADLS_ACUITY_SCORE: 42
ADLS_ACUITY_SCORE: 41
ADLS_ACUITY_SCORE: 42
ADLS_ACUITY_SCORE: 41
ADLS_ACUITY_SCORE: 42

## 2024-10-12 NOTE — PLAN OF CARE
"Goal Outcome Evaluation:      Plan of Care Reviewed With: patient    Overall Patient Progress: no changeOverall Patient Progress: no change    VS: BP (!) 141/71 (BP Location: Left arm)   Pulse 83   Temp 97.9  F (36.6  C) (Oral)   Resp 17   Ht 1.676 m (5' 6\")   Wt 63 kg (138 lb 14.2 oz)   SpO2 97%   BMI 22.42 kg/m       O2: SpO2 > 97 and stable on RA. Denies chest pain and SOB.    Output: Voids spontaneously without difficulty to bathroom / using beside urinal    Last BM: 10/11  denies abdominal discomfort. BS active / passing flatus.    Activity: Up with A1 walker and gait belt.    Skin: WDL except, LVAD    Pain: Denied pain    CMS: Intact, AOx4.    Dressing:    Diet: Easy to chew  diet. Denies nausea/vomiting.      LDA: L PIV SL / Normal saline infusing at 125mL/hr.    Equipment: IV pole, personal belongings,    Plan:  Continue with plan of care. Call light within reach, pt able to make needs known.    Additional Info: PI was low this morning when pt was working with therapy. Order for normal saline once.            "

## 2024-10-12 NOTE — PLAN OF CARE
Goal Outcome Evaluation:      Plan of Care Reviewed With: patient    Overall Patient Progress: no change    A/O x 4, VSS, LVAD WNL . Driveline dressing changed , remains CDI.Sternal Incisions approximated with liquid bandage, abdomen incision MELISSA wih scabs.  Good appetite,  ate 100% of his supper. No acute issues this shift, will continue POC.     Heartmate 3 LEFT VS  Flow (Lpm): 5.6 Lpm  Pulse Index (PI): 5.3 PI  Speed (rpm): 5100 rpm  Power (jackson): 3.3 jackson   Patient's most recent vital signs are:     Vital signs:  MAP: 85 (doppler)   Temp: 98  HR: 85  RR: 18  SpO2: 98 %     Patient does not have new respiratory symptoms.  Patient does not have new sore throat.  Patient does not have a fever greater than 99.5.

## 2024-10-12 NOTE — PLAN OF CARE
"Discharge Planner Post-Acute Rehab SLP:     Discharge Plan: home with wife. Ongoing SLP TBD    Precautions: LVAD    Current Status:  Hearing: Increased volume  Vision: Readers  Communication: Motor speech, language intact  Cognition: Mild cognitive impairment- severe short term memory deficits  Swallow: Easy to chew solids (7)/Thin liquids (0). Pt reported due to lack of dentition, only wants to eat chicken noodle soup and mashed potatoes. Clinical swallow evaluation not indicated at this time, MD can manage diet texture as it is related to dentition status only.     Assessment:  Pt engaged in task, arranging items in closet given criteria. Introduced to strategies to aid completion. Noting ongoing imaired mental flexibility and required cues to reason through directions to determine correct placement. required max cues to complete correctly. Noting ongoing masking of deficits as continues to provide \"excuses\" for errors and continues to report he could have \"figured out out IND\" despite requiring high level of assist to recognize errors and reason throgh material. ongoing poor insight into deficits and impaired mental flexibility. ongoing limited comprehension of impact of PI scores on LVAD and limitation on therapy despite education across multiple providers. SLp provided additional education and pt did report improved comprehension following conversation but suspect carryover of information will continue to be limited     Other Barriers to Discharge (Family Training, etc): level of assist/supervision with meds, financial, and LVAD mgmt?    "

## 2024-10-12 NOTE — PROGRESS NOTES
Received a call from the Acute Rehabilitation Service at [facility] regarding Mr Duane C Johnson.    Mr Aguila has been experiencing volatile pulsatility indices as well as symptomatic orthostatic hypotension. Additionally, he had a minor low flow event on 10/11 in the setting of elevated MAPs to 94. His progress in acute rehab has been significantly limited by orthostasis and he has had no meaningful therapy to this point due to these symptoms.     In regards to management of his symptomatic orthostasis and volatile MAPs and pulsatility indices, I would recommend the following:  - Okay for small fluid challenge, 250-500cc to see if this improves orthostatic symptoms  - Defer changes to hydralazine as resting MAPs are 80s to 90s at rest and higher MAPs have resulted in low flow  - OK to trend PI at this time, no acute interventions other than the above  - Continue monitoring MAPs with symptoms and with changes in activity, this data will be helpful for future management  - IF fluid challenge does not meaningfully improve orthostatic symptoms and Mr Aguila remains unable to tolerate any therapy, will discuss transfer to [facility] for assessment and adjustment of LVAD    Please Vocera or page cardiology with questions or concerns.     Juan Diego Weaver MD [facility], PGY-6  Fellow, Cardiovascular Disease  Pager: [phone]

## 2024-10-12 NOTE — PLAN OF CARE
Goal Outcome Evaluation:      Plan of Care Reviewed With: patient    Overall Patient Progress: improvingOverall Patient Progress: improving     Orientation: alert and oriented x4; able to make needs known. MAP at midnight 98  Recheck MAP 80LVAD parameters WNL  O2 use:room veronica  Pain:denies of pain  Diet:easy to chew level 7thin liquid  Bladder:continent  Bowel:continent. Last BM 10/11/24  Ambulation/Transfer: A1 fWW  Tubes/Lines/Drains: LVAD driveline attached to wall power;  batteries chareged  Skin:intact  Safety:fall/sternal/abdominal precaution  Others:  Asleep most of the night.

## 2024-10-12 NOTE — PROGRESS NOTES
[facility]   Acute Rehabilitation Unit  Weekend Progress Note    INTERVAL HISTORY  Duane C Johnson was seen and examined at bedside.  No acute events reported overnight. Patient continues to be symptomatic in all therapies since Tuesday. He is understandably concerned about him being limited in therapies due to LVAD #'s being slightly out of range or patient being symptomatic. Discussed plan with patient who is agreeable to current plan.     Functionally, patient fluctuates with participation in therapies, still symptomatic sitting at EOB and with standing. required max cues to complete correctly during certain speech therapy tasks.     ROS: 10 point ROS negative other than the symptoms noted above in the HPI.    MEDICATIONS  Current Facility-Administered Medications   Medication Dose Route Frequency Provider Last Rate Last Admin    amiodarone (PACERONE) tablet 200 mg  200 mg Oral Daily Toussaint Gonzalez, Paola, MD   200 mg at 10/12/24 0811    atorvastatin (LIPITOR) tablet 80 mg  80 mg Oral QPM Toussaint Gonzalez, Paola, MD   80 mg at 10/11/24 2006    empagliflozin (JARDIANCE) tablet 10 mg  10 mg Oral Daily Sirisha Arias APRN CNP   10 mg at 10/12/24 0811    escitalopram (LEXAPRO) tablet 10 mg  10 mg Oral or Feeding Tube Daily Toussaint Gonzalez, Paola, MD   10 mg at 10/12/24 0811    gabapentin (NEURONTIN) capsule 100 mg  100 mg Oral or Feeding Tube At Bedtime Toussaint Gonzalez, Paola, MD   100 mg at 10/11/24 2006    hydrALAZINE (APRESOLINE) half-tab 12.5 mg  12.5 mg Oral TID Sirisha Arias APRN CNP   12.5 mg at 10/12/24 0811    losartan (COZAAR) tablet 50 mg  50 mg Oral BID Toussaint Gonzalez, Paola, MD   50 mg at 10/12/24 0812    magnesium oxide (MAG-OX) tablet 400 mg  400 mg Oral Daily Toussaint Gonzalez, Paola, MD   400 mg at 10/12/24 0811    melatonin tablet 10 mg  10 mg Oral At Bedtime Toussaint Gonzalez, Paola, MD   10 mg at 10/11/24 2005    methocarbamol (ROBAXIN)  tablet 750 mg  750 mg Oral TID Toussaint Gonzalez, Paola, MD   750 mg at 10/12/24 0811    multivitamin w/minerals (THERA-VIT-M) tablet 1 tablet  1 tablet Oral BID Toussaint Gonzalez, Paola, MD   1 tablet at 10/12/24 0811    pantoprazole (PROTONIX) EC tablet 40 mg  40 mg Oral QAM AC Toussaint Gonzalez, Paola, MD   40 mg at 10/12/24 0535    warfarin ANTICOAGULANT (COUMADIN) tablet 2.5 mg  2.5 mg Oral ONCE at 18:00 Stanislaw Fountain DO        Warfarin Dose Required Daily - Pharmacist Managed  1 each Oral See Admin Instructions Toussaint Gonzalez, Paola, MD              Current Facility-Administered Medications   Medication Dose Route Frequency Provider Last Rate Last Admin    acetaminophen (TYLENOL) tablet 975 mg  975 mg Oral Q8H PRN Roberto Carlos Braswell MD        bisacodyl (DULCOLAX) suppository 10 mg  10 mg Rectal Daily PRN Toussaint Gonzalez, Paola, MD        lidocaine (LMX4) cream   Topical Q1H PRN Toussaint Gonzalez, Paola, MD        lidocaine 1 % 0.1-1 mL  0.1-1 mL Other Q1H PRN Toussaint Gonzalez, Paola, MD        naloxone (NARCAN) injection 0.2 mg  0.2 mg Intravenous Q2 Min PRN Toussaint Gonzalez, Paola, MD        Or    naloxone (NARCAN) injection 0.4 mg  0.4 mg Intravenous Q2 Min PRN Toussaint Gonzalez, Paola, MD        Or    naloxone (NARCAN) injection 0.2 mg  0.2 mg Intramuscular Q2 Min PRN Toussaint Gonzalez, Paola, MD        Or    naloxone (NARCAN) injection 0.4 mg  0.4 mg Intramuscular Q2 Min PRN Toussaint Gonzalez, Paola, MD        ondansetron (ZOFRAN ODT) ODT tab 4 mg  4 mg Oral Q6H PRN Toussaint Gonzalez, Paola, MD        oxyCODONE (ROXICODONE) tablet 5 mg  5 mg Oral Q4H PRN Toussaint Gonzalez, Paola, MD        Patient is already receiving anticoagulation with heparin, enoxaparin (LOVENOX), warfarin (COUMADIN)  or other anticoagulant medication   Does not apply Continuous PRN Toussaint Gonzalez, Paola, MD        polyethylene glycol (MIRALAX) Packet 17 g  17 g Oral or Feeding Tube BID PRN Toussaint  "Shakira Marte MD   17 g at 10/07/24 2159    senna-docusate (SENOKOT-S/PERICOLACE) 8.6-50 MG per tablet 2 tablet  2 tablet Oral or Feeding Tube BID PRN Toussaint Gonzalez, Paola, MD   2 tablet at 10/10/24 0817    sodium chloride (PF) 0.9% PF flush 3 mL  3 mL Intracatheter q1 min prn Toussaint Gonzalez, Paola, MD            PHYSICAL EXAM  BP (!) 141/71 (BP Location: Left arm)   Pulse 83   Temp 97.9  F (36.6  C) (Oral)   Resp 17   Ht 1.676 m (5' 6\")   Wt 63 kg (138 lb 14.2 oz)   SpO2 97%   BMI 22.42 kg/m      General: No acute distress. Lying upright in bed.  Cardiovascular: lvad hum    Pulmonary: Breathing comfortably on room air, clear to auscultation bilaterally  Abdominal: Non-tender, non-distended, bowel sounds present in all four quadrants   Extremities: Warm, well perfused, no edema in bilateral lower extremities, no tenderness in calves   Neuro/MSK:  Alert. No gross changes to neuro exam compared to prior.     LABS  Recent Results (from the past 24 hour(s))   INR    Collection Time: 10/12/24  5:40 AM   Result Value Ref Range    INR 2.24 (H) 0.85 - 1.15   Extra Green Top (Lithium Heparin) Tube    Collection Time: 10/12/24  5:40 AM   Result Value Ref Range    Hold Specimen JIC          ASSESSMENT AND PLAN  Duane C Johnson is a 74 year old male admitted to [facility] for acute inpatient rehabilitation.    Rehabilitation - Continue comprehensive acute inpatient rehabilitation program with multidisciplinary approach including therapies, rehab nursing, and physiatry following. See interval history for updates.      - Vitals stable. Labs reviewed.  - Continue therapies and plan of care.  - Refer to last progress note from primary team for full problems list.  - Discussed at length with patient, therapies, nursing and cardiology team about plan moving forward. Patient continues to be symptomatic in all therapies since Tuesday. He is understandably concerned about him being limited in therapies due to " LVAD #'s being slightly out of range or patient being symptomatic. Discussed with cardiology team that PI numbers can fluctuate especially with new LVADs with lower concern for slight abnormalities, flow can be more concerning and could be related to fluid status. Recommended small fluid challenge to see if this improves orthostatic symptoms. If this does not help today, will consider transfer tomorrow for assessment and adjustment of LVAD.     Per cardiology team recommendation:   - Okay for small fluid challenge, 250-500cc to see if this improves orthostatic symptoms  - Defer changes to hydralazine as resting MAPs are 80s to 90s at rest and higher MAPs have resulted in low flow  - OK to trend PI at this time, no acute interventions other than the above  - Continue monitoring MAPs with symptoms and with changes in activity, this data will be helpful for future management  - IF fluid challenge does not meaningfully improve orthostatic symptoms and Mr Aguila remains unable to tolerate any therapy, will discuss transfer to [facility] for assessment and adjustment of LVAD    Patient seen and discussed with Dr. Devora Olvera MD,  PM&R Staff Physician    Kumar Carrero,   PGY-4 Physical Medicine & Rehabilitation            I, Devora Olvera, saw this patient with my resident Dr. Carrero and agree with his findings and plan of care as documented in his note.     I personally reviewed the chart (vitals signs, medications, and labs).     I was present during the interview and exam, reviewed the plan as outlined and edited the above note.     Devora Olvera MD  Physical Medicine & Rehabilitation

## 2024-10-12 NOTE — PLAN OF CARE
"Goal Outcome Evaluation:                      Discharge Plan: home w/ spouse assist     Precautions: fall, LVAD- monitor for low PI's      Current Status:  Bed Mobility: Viri rolling, Sup<>sit SBA w/ bedrail   Transfer: SBA w/ 4WW   Gait: Up to 200' SBA w/ 4WW   Stairs: 6\" x4 w/ joel railings, CGA  Balance: Stable dynamic sitting, SBA for unsupported dynamic standing      Outcome Measures:   5TSTS (modified, light UE push off): 32 seconds on 10/9  6MWT: max amb distance 150' on 10/9.      Assessment:pt limited by PI, focus on seated activities during PT sessions.  Lowest PI observed seated was 2.1.    Poor memory, did not recall PT session from less than 2 hours prior. Pt expressed frustration regarding lack of activity (would really like to walk) amid fluctuating PI numbers.       Other Barriers to Discharge (DME, Family Training, etc):   3STE w/ railings. 10STI w/ chair lift available     Family training: no true hands-on training required as pt SBA-nearing Viri pending PI variability, caregiver connection Saturday 9:30-11.      4WW: needed prior to discharge  "

## 2024-10-12 NOTE — PROGRESS NOTES
"[facility]    Medicine Progress Note - Hospitalist Service    Date of Admission:  10/5/2024    Assessment & Plan   Duane C Johnson is a 74 year old male pmhx of anterior STEMI s/p PCI to the pLAD on 10/26/23 with a VT/VF arrest the next day (10/27) Placed on amiodarone and discharged 11/03/23, ICD was not indicated as this was within 48h of his MI. His EF prior to discharge was 20-30% with a large area of akinesis in the LAD, placed on apixaban due to this (Apixaban dcd on 7/5/24).   Had multiple admissions for HF exacerbation     Admitted on 8/30/24 for CHF exacerbation with BNP 16k. CPX and RHC showed significant functional limitations due to heart failue. Underwent HM3 LVAD on 9/18/24 needed mediastinal re-exploration for post operative hemorrhage.   Transferred to ARU on 10/5 for rehab      #Weakness  #headache,  tingling in fingers  -10/9  post therapy that resolved after got back to bed. Code stroke cancelled but then cancelled as symptoms resolved and had no focal deficits   - discussed with  cardiology  and gave 500 ml fluid bolus 10/9  - head CT was negative for acute intracranial process   -  10/9 EKG  sinus  - monitor for infection  , no leukocytosis, no fever noted   - 10/10 after got up post BM had some left arm \" clumsiness\"  now gone, did have headache now also gone. He is not the best historian  -states he heard alarm around 4 AM, this AM had MAP 72 then post medications 60-- 58, flow 3.7 PI 3.1   - hydralazin held after AM dose 10/9 , see bellmarlo        #hyponatremia  - Na 131 10/9-->128 ---132     Weights 10/5 66.8---64.1 ( bedside scale)  --63.3 ( standing scale) --64.8 ---61.96 10/10 on standing scale --- 63 kg ( 10/12)  in bed scale       # Acute on chronic systolic heart failure secondary to ICM   #HFrEF  s/p HM3 LVAD as DT on 9/18/2024  # CAD s/p PCI  # History of STEMI, CAD status post  PCI to LAD in 2023   # s/p ICD 5/8/2024 with history VT/VF " arrest 2023   -Stage D. NYHA Class III.  - gets some shortness of breath  with therapy but not at rest    - had low PI 10/9 ad cardiology  was notified, monitor closely and call cardiology  if remain < 2  . Has had PI 1.6 7:50 AM with thapy and post recheck was 4.4 then again had 1.7  9;42  then 2.8 at 10    - his PI fluctuate, has small LV per cardiology    - goal MAP 65-85      -  10/9  EKG  sinus  rhythm   - 10/9 500 ml fluid bolus   - speed decreased to 5100  Fluid status: Bumex to 0.5 mg daily. ( Has been on hold )   ACEi/ARB/ARNi: 50 mg losartan BID  Vasodilator: Hydralazine 50 mg TID increased to 75 mg tid 10/7  and held form 10/9  and restarted 12.5 mg TID 10/11   Received extra dose of hydralazine 25 mg 10/7    BB: Recent LVAD, not on any   Aldosterone antagonist deferred while other medical therapy is prioritized  SGLT2i:  Empagliflozin 10 mg   SCD prophylaxis: ICD  MAP: goal 65-85,   LDH trends: 230, stable  Anticoagulation: warfarin dosing per pharmacy, INR goal 2-3,   Antiplatelet: ASA not indicated in LVAD population, > 6 months s/p STEMI     -Patient  has not been able to participate in physical therapy  due to feeling dizzy when gets up , I was by bedside, he does endorse some dizziness while  sitting up, MAP 66 while  sitting up, there is concerns weather patient  needs to return to the hospital as he cannot participate in therapies  - needs stric I/os , seems to have ongoing negative fluid balance so I wonder if he is dry. ARU team will be reaching out to cards           # Low flow alarms, low PI , PI fluctuating   # HTN  Multiple low flow alarms 10/1, none reported 10/2. Verified that doppler measure is MAP vs. SBP.   - Speed optimization echo performed 10/1, speed decreased to 5100  - Losartan 50 mg BID  - Hydralazine 50 mg TID--> 75 mg TID  10/7 ---12,5 mg TID 10/11-  - continue to  monitor closely       - echo 10/10:  Left ventricular function is decreased. The ejection fraction is  15-20%  (severely reduced).  LVAD inflow and outflow cannula Dopplers are normal.  Septum bows towards the RV.  Global right ventricular function is moderately reduced.  Aortic valve opens with each cardiac cycle.  The inferior vena cava is normal.       # History of VT/VF arrest 2023  # AFib s/p DCCV 9/5/2024 and again 9/30/2024  - Successfully cardioverted in early September for AF. Since then EKG suggested AF on 9/21. Tele is only available from as early as 9/22. The patient was started on hep gtt 9/21, but unclear if was in AF before this. While the patient was on hep gtt until INR was therapeutic, it is not possible to assess rhythm prior to 9/21. Systemic AC was off on 9/18-9/20. S/p MELBA and DCCV on 9/30 with conversion to sinus rhythm   - continue amiodarone 200 mg daily      # Visual changes, resolved.   -Stroke code call 9/29 for visual changes - right eye with decreased upper half of vision and bluish haze. Resolved after 30 minutes. Seen by opthalmology and neuro. Negative head CT and CTA head and neck. He has had 3 episodes which last about 5 minutes before resolving completely.   - Ophthalmology and Neuro, signed off  - Already on A/C  - Continue to monitor for changes     # Hypervolemic hyponatremia  Na 132, ranging 128-133  - encourage PO intake  - Bumex 0.5 mg daily now on hold , last received 10/5  , discussed with   cardiology and restart if start gaining weight or signs of  volume increase     - did get 500 ml fluid bolus 10/9      # Anemia  - Hg  stable in 8 range   - Iron studies suggestive of RADHA. Received IV venofer x5 days last day 10/2     # Bilateral pleural effusions  - Chest tube removed 9/30.   - Left thoracentesis done 10/1, 1 L removed, R thoracentesis done 10/3, 1 L removed.       #History localized prostate cancer, Goodwin 4+7=7   - was not felt to be surgical candidate die to his heart disease   - received  Lupron q 3 months seems  received last dose this  year , radiation therapy      10/ 8-9-10-11 discussed with  cardiology   also discussed with  ARU team , Rns       Diet: Room Service  Combination Diet Regular Diet; Easy to Chew (level 7); Thin Liquids (level 0)  Snacks/Supplements Adult: Ensure Enlive; With Meals  Snacks/Supplements Adult: Magic Cup; With Meals  Snacks/Supplements Adult: Other; Cottage cheese + strawberry Greek yogurt TID with meals; With Meals    DVT Prophylaxis: Warfarin  Michael Catheter: Not present  Lines: None     Cardiac Monitoring: None  Code Status: Full Code      Clinically Significant Risk Factors         # Hyponatremia: Lowest Na = 132 mmol/L in last 2 days, will monitor as appropriate       # Hypoalbuminemia: Lowest albumin = 2.6 g/dL at 10/9/2024  7:28 PM, will monitor as appropriate        # End stage heart failure: Ventricular assist device (VAD) present           # Severe Malnutrition: based on nutrition assessment    # Financial/Environmental Concerns:     # ICD device present          Jenniffer Rowland MD  Hospitalist Service  [facility]  Securely message with [employer] (more info)  Text page via [employer] Paging/Directory   ______________________________________________________________________    Interval History   Still feels dizzy when sits up, even while  sitting up for a while  feels dizzy, no chest pain    Has not been wayne to participate in therapies due to symptoms       Physical Exam   Vital Signs: Temp: 98  F (36.7  C) Temp src: Oral   Pulse: 83   Resp: 17 SpO2: 97 % O2 Device: None (Room air)    Weight: 138 lbs 14.24 oz  General appearence: awake alert  in  no apparent distress    HEENT: EOMI, PEARLA, sclera nonicteric,  dry mucus membranes,   NECK : supple  RESPIRATORY: lungs clear  , LVAD sounds on left    CARDIOVASCULAR:  LVAD sounds   GASTROINTESTINAL:soft, non-distended , non-tender , + bowel sounds, also LVAD sounds     SKIN: warm and dry, no mottling noted   NEUROLOGIC; awake alert and oriented, no  focal deficits noted   EXTREMITIES: no clubbing, cyanosis or edema ,      Data     I have personally reviewed the following data over the past 24 hrs:    INR:  2.24 (H) PTT:  N/A   D-dimer:  N/A Fibrinogen:  N/A       Imaging results reviewed over the past 24 hrs:   No results found for this or any previous visit (from the past 24 hour(s)).

## 2024-10-12 NOTE — PROGRESS NOTES
Discharge Planner Post-Acute Rehab OT:      Discharge Plan: home with wife, HH OT.      Precautions: falls, sternal, cardiac, LVAD, short term memory deficits     Current Status:  ADLs:  Mobility: S/SBA WWW if on wall power, nearing mod IND if on battery power  Grooming: IND standing at sink  Dressing: UB - IND LB - IND with 4WW  Bathing: supervision with FB spongebath seated at EOB  Toileting: Supervision with 4WW  IADLs: TBD. Supportive spouse who can assist  Vision/Cognition: Pt scored 17/30 on SLUMs in 10/3/24. ACE-III on 10/8/24: 74/100 indicating cognitive deficits      Assessment:   Pt's PI continues to fluctuate. Session focused on assessing PI and MAP with activity per MD request. MD reporting cardiology isn't concerned regarding fluctuating PI's d/t pt having small ventricle, but wants activity to be based off of symptoms. Pt was symptomatic seated at EOB and standing with reports of lightheadedness/dizziness. Pt is a poor historian which is limiting.     -15 d/t labile PI and symptomatic      Other Barriers to Discharge (DME, Family Training, etc):   Family training prior to discharge with spouse  AE/DME: Shower chair

## 2024-10-13 ENCOUNTER — APPOINTMENT (OUTPATIENT)
Dept: OCCUPATIONAL THERAPY | Facility: CLINIC | Age: 74
DRG: 949 | End: 2024-10-13
Attending: PHYSICAL MEDICINE & REHABILITATION
Payer: MEDICARE

## 2024-10-13 ENCOUNTER — APPOINTMENT (OUTPATIENT)
Dept: PHYSICAL THERAPY | Facility: CLINIC | Age: 74
DRG: 949 | End: 2024-10-13
Attending: PHYSICAL MEDICINE & REHABILITATION
Payer: MEDICARE

## 2024-10-13 LAB
ANION GAP SERPL CALCULATED.3IONS-SCNC: 9 MMOL/L (ref 7–15)
ATRIAL RATE - MUSE: 75 BPM
BASOPHILS # BLD AUTO: 0.1 10E3/UL (ref 0–0.2)
BASOPHILS NFR BLD AUTO: 1 %
BUN SERPL-MCNC: 20.3 MG/DL (ref 8–23)
CALCIUM SERPL-MCNC: 8.5 MG/DL (ref 8.8–10.4)
CHLORIDE SERPL-SCNC: 98 MMOL/L (ref 98–107)
CREAT SERPL-MCNC: 0.68 MG/DL (ref 0.67–1.17)
DIASTOLIC BLOOD PRESSURE - MUSE: NORMAL MMHG
EGFRCR SERPLBLD CKD-EPI 2021: >90 ML/MIN/1.73M2
EOSINOPHIL # BLD AUTO: 0.1 10E3/UL (ref 0–0.7)
EOSINOPHIL NFR BLD AUTO: 2 %
ERYTHROCYTE [DISTWIDTH] IN BLOOD BY AUTOMATED COUNT: 17.2 % (ref 10–15)
GLUCOSE SERPL-MCNC: 99 MG/DL (ref 70–99)
HCO3 SERPL-SCNC: 24 MMOL/L (ref 22–29)
HCT VFR BLD AUTO: 29 % (ref 40–53)
HGB BLD-MCNC: 9.5 G/DL (ref 13.3–17.7)
IMM GRANULOCYTES # BLD: 0 10E3/UL
IMM GRANULOCYTES NFR BLD: 0 %
INR PPP: 2.33 (ref 0.85–1.15)
INTERPRETATION ECG - MUSE: NORMAL
LYMPHOCYTES # BLD AUTO: 0.5 10E3/UL (ref 0.8–5.3)
LYMPHOCYTES NFR BLD AUTO: 9 %
MCH RBC QN AUTO: 31.6 PG (ref 26.5–33)
MCHC RBC AUTO-ENTMCNC: 32.8 G/DL (ref 31.5–36.5)
MCV RBC AUTO: 96 FL (ref 78–100)
MONOCYTES # BLD AUTO: 0.7 10E3/UL (ref 0–1.3)
MONOCYTES NFR BLD AUTO: 11 %
NEUTROPHILS # BLD AUTO: 4.5 10E3/UL (ref 1.6–8.3)
NEUTROPHILS NFR BLD AUTO: 77 %
NRBC # BLD AUTO: 0 10E3/UL
NRBC BLD AUTO-RTO: 0 /100
P AXIS - MUSE: -29 DEGREES
PLATELET # BLD AUTO: 212 10E3/UL (ref 150–450)
POTASSIUM SERPL-SCNC: 4.3 MMOL/L (ref 3.4–5.3)
PR INTERVAL - MUSE: 224 MS
QRS DURATION - MUSE: 162 MS
QT - MUSE: 438 MS
QTC - MUSE: 489 MS
R AXIS - MUSE: 238 DEGREES
RBC # BLD AUTO: 3.01 10E6/UL (ref 4.4–5.9)
SODIUM SERPL-SCNC: 131 MMOL/L (ref 135–145)
SYSTOLIC BLOOD PRESSURE - MUSE: NORMAL MMHG
T AXIS - MUSE: 131 DEGREES
VENTRICULAR RATE- MUSE: 75 BPM
WBC # BLD AUTO: 5.9 10E3/UL (ref 4–11)

## 2024-10-13 PROCEDURE — 250N000013 HC RX MED GY IP 250 OP 250 PS 637: Performed by: PHYSICAL MEDICINE & REHABILITATION

## 2024-10-13 PROCEDURE — 97535 SELF CARE MNGMENT TRAINING: CPT | Mod: GO

## 2024-10-13 PROCEDURE — 250N000013 HC RX MED GY IP 250 OP 250 PS 637: Performed by: NURSE PRACTITIONER

## 2024-10-13 PROCEDURE — 80048 BASIC METABOLIC PNL TOTAL CA: CPT | Performed by: INTERNAL MEDICINE

## 2024-10-13 PROCEDURE — 97530 THERAPEUTIC ACTIVITIES: CPT | Mod: GP

## 2024-10-13 PROCEDURE — 85610 PROTHROMBIN TIME: CPT

## 2024-10-13 PROCEDURE — 85025 COMPLETE CBC W/AUTO DIFF WBC: CPT | Performed by: INTERNAL MEDICINE

## 2024-10-13 PROCEDURE — 250N000013 HC RX MED GY IP 250 OP 250 PS 637

## 2024-10-13 PROCEDURE — 36415 COLL VENOUS BLD VENIPUNCTURE: CPT | Performed by: INTERNAL MEDICINE

## 2024-10-13 PROCEDURE — 97110 THERAPEUTIC EXERCISES: CPT | Mod: GP

## 2024-10-13 PROCEDURE — 250N000013 HC RX MED GY IP 250 OP 250 PS 637: Performed by: INTERNAL MEDICINE

## 2024-10-13 PROCEDURE — 99232 SBSQ HOSP IP/OBS MODERATE 35: CPT | Performed by: INTERNAL MEDICINE

## 2024-10-13 PROCEDURE — 128N000003 HC R&B REHAB

## 2024-10-13 PROCEDURE — 99232 SBSQ HOSP IP/OBS MODERATE 35: CPT | Mod: GC

## 2024-10-13 RX ORDER — WARFARIN SODIUM 2.5 MG/1
2.5 TABLET ORAL
Status: COMPLETED | OUTPATIENT
Start: 2024-10-13 | End: 2024-10-13

## 2024-10-13 RX ORDER — WARFARIN SODIUM 2.5 MG/1
2.5 TABLET ORAL DAILY
Qty: 30 TABLET | Refills: 0 | Status: ON HOLD | OUTPATIENT
Start: 2024-10-13 | End: 2024-11-09

## 2024-10-13 RX ORDER — HYDRALAZINE HYDROCHLORIDE 25 MG/1
25 TABLET, FILM COATED ORAL 3 TIMES DAILY
Qty: 90 TABLET | Refills: 0 | Status: ON HOLD | OUTPATIENT
Start: 2024-10-13 | End: 2024-10-24

## 2024-10-13 RX ADMIN — WARFARIN SODIUM 2.5 MG: 2.5 TABLET ORAL at 17:49

## 2024-10-13 RX ADMIN — LOSARTAN POTASSIUM 50 MG: 50 TABLET, FILM COATED ORAL at 08:06

## 2024-10-13 RX ADMIN — ATORVASTATIN CALCIUM 80 MG: 80 TABLET, FILM COATED ORAL at 20:04

## 2024-10-13 RX ADMIN — Medication 10 MG: at 20:04

## 2024-10-13 RX ADMIN — ESCITALOPRAM OXALATE 10 MG: 10 TABLET ORAL at 08:06

## 2024-10-13 RX ADMIN — PANTOPRAZOLE SODIUM 40 MG: 40 TABLET, DELAYED RELEASE ORAL at 06:59

## 2024-10-13 RX ADMIN — Medication 12.5 MG: at 08:06

## 2024-10-13 RX ADMIN — LOSARTAN POTASSIUM 50 MG: 50 TABLET, FILM COATED ORAL at 20:04

## 2024-10-13 RX ADMIN — METHOCARBAMOL 750 MG: 750 TABLET ORAL at 13:25

## 2024-10-13 RX ADMIN — Medication 12.5 MG: at 11:54

## 2024-10-13 RX ADMIN — Medication 25 MG: at 20:04

## 2024-10-13 RX ADMIN — MAGNESIUM OXIDE TAB 400 MG (241.3 MG ELEMENTAL MG) 400 MG: 400 (241.3 MG) TAB at 08:06

## 2024-10-13 RX ADMIN — Medication 25 MG: at 13:25

## 2024-10-13 RX ADMIN — METHOCARBAMOL 750 MG: 750 TABLET ORAL at 08:06

## 2024-10-13 RX ADMIN — EMPAGLIFLOZIN 10 MG: 10 TABLET, FILM COATED ORAL at 08:06

## 2024-10-13 RX ADMIN — METHOCARBAMOL 750 MG: 750 TABLET ORAL at 20:04

## 2024-10-13 RX ADMIN — Medication 1 TABLET: at 08:06

## 2024-10-13 RX ADMIN — GABAPENTIN 100 MG: 100 CAPSULE ORAL at 20:04

## 2024-10-13 RX ADMIN — AMIODARONE HYDROCHLORIDE 200 MG: 200 TABLET ORAL at 08:06

## 2024-10-13 RX ADMIN — Medication 1 TABLET: at 20:04

## 2024-10-13 ASSESSMENT — ACTIVITIES OF DAILY LIVING (ADL)
ADLS_ACUITY_SCORE: 41
ADLS_ACUITY_SCORE: 42
ADLS_ACUITY_SCORE: 41
ADLS_ACUITY_SCORE: 41
ADLS_ACUITY_SCORE: 42
ADLS_ACUITY_SCORE: 41
ADLS_ACUITY_SCORE: 41
ADLS_ACUITY_SCORE: 42
ADLS_ACUITY_SCORE: 41
ADLS_ACUITY_SCORE: 42
ADLS_ACUITY_SCORE: 42

## 2024-10-13 NOTE — PLAN OF CARE
"Goal Outcome Evaluation:      Plan of Care Reviewed With: patient    Overall Patient Progress: no changeOverall Patient Progress: no change    VS: /87 (BP Location: Right arm)   Pulse 93   Temp 98.5  F (36.9  C) (Oral)   Resp 20   Ht 1.676 m (5' 6\")   Wt 60.2 kg (132 lb 11.5 oz)   SpO2 97%   BMI 21.42 kg/m           O2: SpO2 > 97 and stable on RA. Denies chest pain and SOB.    Output: Voids spontaneously without difficulty to bathroom / using beside urinal    Last BM: 10/11  denies abdominal discomfort. BS active / passing flatus.    Activity: Up with A1 walker and gait belt.    Skin: WDL except, LVAD    Pain: Denied pain    CMS: Intact, AOx4.    Dressing:     Diet: Easy to chew  diet. Denies nausea/vomiting.       LDA: L PIV SL    Equipment: IV pole, personal belongings,    Plan:  Continue with plan of care. Call light within reach, pt able to make needs known.    Additional Info: Call light within reach. MAP=74. Given x1 dose of hydralazine per cardiology at noon.          "

## 2024-10-13 NOTE — PROGRESS NOTES
"Pt requested to meet with SW, he told SW he wanted to \"unsign\" the IMM given to him. SW explained the IMM just explains the discharge appeal rights given by Medicare, and by signing he is not agreeing to appeal/not appeal, just acknowledging his rights. Patient stated he understood, but still wanted to \"unsign\". SW rescinded previous IMM and placed a new IMM in his chart detailing his wishes to not sign.     DMITRY Salinas  Post Acute Float   [facility]    Phone: [phone]  Fax: [phone]    "

## 2024-10-13 NOTE — PROGRESS NOTES
[facility]   Acute Rehabilitation Unit  Weekend Progress Note    INTERVAL HISTORY  Duane C Johnson was seen and examined at bedside.  No acute events reported overnight. Patient states he slept poorly due to interruptions.  Discussed at length about paying attention to symptoms today during his two therapies sessions today, monitoring and writing down if he has dizziness/lightheadedness. Discussed with patient that if his LVAD were to needing to be adjusted will need to transfer which patient understands.     Functionally, patient fluctuates with participation in therapies. This AM with OT Completed sponge bath EOB, minimal dizziness with high fluctuating PI from 5.6 to 10.2. Dropped from 9.3 to 5.6 during standing.     ROS: 10 point ROS negative other than the symptoms noted above in the HPI.    MEDICATIONS  Current Facility-Administered Medications   Medication Dose Route Frequency Provider Last Rate Last Admin    amiodarone (PACERONE) tablet 200 mg  200 mg Oral Daily Toussaint Gonzalez, Paola, MD   200 mg at 10/13/24 0806    atorvastatin (LIPITOR) tablet 80 mg  80 mg Oral QPM Toussaint Gonzalez, Paola, MD   80 mg at 10/12/24 2002    empagliflozin (JARDIANCE) tablet 10 mg  10 mg Oral Daily Sirisha Arias APRN CNP   10 mg at 10/13/24 0806    escitalopram (LEXAPRO) tablet 10 mg  10 mg Oral or Feeding Tube Daily Toussaint Gonzalez, Paola, MD   10 mg at 10/13/24 0806    gabapentin (NEURONTIN) capsule 100 mg  100 mg Oral or Feeding Tube At Bedtime Toussaint Gonzalez, Paola, MD   100 mg at 10/12/24 2002    hydrALAZINE (APRESOLINE) half-tab 25 mg  25 mg Oral TID Jenniffer Rowland MD        losartan (COZAAR) tablet 50 mg  50 mg Oral BID Toussaint Gonzalez, Paola, MD   50 mg at 10/13/24 0806    magnesium oxide (MAG-OX) tablet 400 mg  400 mg Oral Daily Toussaint Gonzalez, Paola, MD   400 mg at 10/13/24 0806    melatonin tablet 10 mg  10 mg Oral At Bedtime Toussaint Gonzalez, Paola, MD    10 mg at 10/12/24 2002    methocarbamol (ROBAXIN) tablet 750 mg  750 mg Oral TID Toussaint Gonzalez, Paola, MD   750 mg at 10/13/24 0806    multivitamin w/minerals (THERA-VIT-M) tablet 1 tablet  1 tablet Oral BID Toussaint Gonzalez, Paola, MD   1 tablet at 10/13/24 0806    pantoprazole (PROTONIX) EC tablet 40 mg  40 mg Oral QAM AC Toussaint Gonzalez, Paola, MD   40 mg at 10/13/24 0659    warfarin ANTICOAGULANT (COUMADIN) tablet 2.5 mg  2.5 mg Oral ONCE at 18:00 Stanislaw Fountain DO        Warfarin Dose Required Daily - Pharmacist Managed  1 each Oral See Admin Instructions Toussaint Gonzalez, Paola, MD              Current Facility-Administered Medications   Medication Dose Route Frequency Provider Last Rate Last Admin    acetaminophen (TYLENOL) tablet 975 mg  975 mg Oral Q8H PRN Roberto Carlos Braswell MD        bisacodyl (DULCOLAX) suppository 10 mg  10 mg Rectal Daily PRN Toussaint Gonzalez, Paola, MD        lidocaine (LMX4) cream   Topical Q1H PRN Toussaint Gonzalez, Paola, MD        lidocaine 1 % 0.1-1 mL  0.1-1 mL Other Q1H PRN Toussaint Gonzalez, Paola, MD        naloxone (NARCAN) injection 0.2 mg  0.2 mg Intravenous Q2 Min PRN Toussaint Gonzalez, Paola, MD        Or    naloxone (NARCAN) injection 0.4 mg  0.4 mg Intravenous Q2 Min PRN Toussaint Gonzalez, Paola, MD        Or    naloxone (NARCAN) injection 0.2 mg  0.2 mg Intramuscular Q2 Min PRN Toussaint Gonzalez, Paola, MD        Or    naloxone (NARCAN) injection 0.4 mg  0.4 mg Intramuscular Q2 Min PRN Toussaint Gonzalez, Paola, MD        ondansetron (ZOFRAN ODT) ODT tab 4 mg  4 mg Oral Q6H PRN Toussaint Gonzalez, Paola, MD        oxyCODONE (ROXICODONE) tablet 5 mg  5 mg Oral Q4H PRN Toussaint Gonzalez, Paola, MD        Patient is already receiving anticoagulation with heparin, enoxaparin (LOVENOX), warfarin (COUMADIN)  or other anticoagulant medication   Does not apply Continuous PRN Toussaint Gonzalez, Paola, MD        polyethylene glycol (MIRALAX) Packet 17  "g  17 g Oral or Feeding Tube BID PRN Toussaint Gonzalez, Paola, MD   17 g at 10/07/24 2159    senna-docusate (SENOKOT-S/PERICOLACE) 8.6-50 MG per tablet 2 tablet  2 tablet Oral or Feeding Tube BID PRN Toussaint Gonzalez, Paola, MD   2 tablet at 10/10/24 0817    sodium chloride (PF) 0.9% PF flush 3 mL  3 mL Intracatheter q1 min prn Toussaint Gonzalez, Paola, MD            PHYSICAL EXAM  /87 (BP Location: Right arm)   Pulse 93   Temp 98.5  F (36.9  C) (Oral)   Resp 20   Ht 1.676 m (5' 6\")   Wt 60.2 kg (132 lb 11.5 oz)   SpO2 97%   BMI 21.42 kg/m      General: No acute distress. Lying upright in bed.  Cardiovascular: lvad hum    Pulmonary: Breathing comfortably on room air, clear to auscultation bilaterally  Abdominal: Non-tender, non-distended, bowel sounds present in all four quadrants   Extremities: Warm, well perfused, no edema in bilateral lower extremities, no tenderness in calves   Neuro/MSK:  Alert. No gross changes to neuro exam compared to prior.     LABS  Recent Results (from the past 24 hour(s))   INR    Collection Time: 10/13/24  5:48 AM   Result Value Ref Range    INR 2.33 (H) 0.85 - 1.15   Basic metabolic panel    Collection Time: 10/13/24  5:48 AM   Result Value Ref Range    Sodium 131 (L) 135 - 145 mmol/L    Potassium 4.3 3.4 - 5.3 mmol/L    Chloride 98 98 - 107 mmol/L    Carbon Dioxide (CO2) 24 22 - 29 mmol/L    Anion Gap 9 7 - 15 mmol/L    Urea Nitrogen 20.3 8.0 - 23.0 mg/dL    Creatinine 0.68 0.67 - 1.17 mg/dL    GFR Estimate >90 >60 mL/min/1.73m2    Calcium 8.5 (L) 8.8 - 10.4 mg/dL    Glucose 99 70 - 99 mg/dL   CBC with platelets and differential    Collection Time: 10/13/24  5:48 AM   Result Value Ref Range    WBC Count 5.9 4.0 - 11.0 10e3/uL    RBC Count 3.01 (L) 4.40 - 5.90 10e6/uL    Hemoglobin 9.5 (L) 13.3 - 17.7 g/dL    Hematocrit 29.0 (L) 40.0 - 53.0 %    MCV 96 78 - 100 fL    MCH 31.6 26.5 - 33.0 pg    MCHC 32.8 31.5 - 36.5 g/dL    RDW 17.2 (H) 10.0 - 15.0 %    Platelet Count " 212 150 - 450 10e3/uL    % Neutrophils 77 %    % Lymphocytes 9 %    % Monocytes 11 %    % Eosinophils 2 %    % Basophils 1 %    % Immature Granulocytes 0 %    NRBCs per 100 WBC 0 <1 /100    Absolute Neutrophils 4.5 1.6 - 8.3 10e3/uL    Absolute Lymphocytes 0.5 (L) 0.8 - 5.3 10e3/uL    Absolute Monocytes 0.7 0.0 - 1.3 10e3/uL    Absolute Eosinophils 0.1 0.0 - 0.7 10e3/uL    Absolute Basophils 0.1 0.0 - 0.2 10e3/uL    Absolute Immature Granulocytes 0.0 <=0.4 10e3/uL    Absolute NRBCs 0.0 10e3/uL         ASSESSMENT AND PLAN  Duane C Johnson is a 74 year old male admitted to [facility] for acute inpatient rehabilitation.    Rehabilitation - Continue comprehensive acute inpatient rehabilitation program with multidisciplinary approach including therapies, rehab nursing, and physiatry following. See interval history for updates.      - Vitals stable. Labs reviewed.  - Continue therapies and plan of care.  - Refer to last progress note from primary team for full problems list.  - Discussed at length about paying attention to symptoms today during his two therapies sessions today, monitoring and writing down if he has dizziness/lightheadedness. Flow rate numbers out of normal range intermittently this AM, continues to fluctuate. If he continues to be symptomatic medicine team today will reach out to cardiology to discuss transfer.   - See hospitalist team and cardiology notes for details. Received bolus IVF on Saturday and hydralazine dose was adjusted on Sunday.     Patient seen and discussed with Dr. Devora Olvera MD,  PM&R Staff Physician    Kumar Carrero, DO  PGY-4 Physical Medicine & Rehabilitation          IDevora, saw this patient with my resident Dr. Carrero and agree with his findings and plan of care as documented in his note.     I personally reviewed the chart (vitals signs, medications, and labs).     I was present during the interview and exam, reviewed the plan as outlined and edited the above  note.     Cardiology team was contacted multiple times this weekend. They initially considered taking him back to [place] but recommended against transfer on Sunday; see their note.    He did better Saturday afternoon and Sunday after bolus IVF. Discharge date was on Monday but changed it to Tuesday.   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 LVAD coordinator needs to be notified as well.     His cognitive deficits and memory impairment make clinical judgment difficult. Provided more education today and asked him to monitor symptoms closely.     Devora Olvera MD  Physical Medicine & Rehabilitation

## 2024-10-13 NOTE — PROGRESS NOTES
"[facility]    Medicine Progress Note - Hospitalist Service    Date of Admission:  10/5/2024    Assessment & Plan   Duane C Johnson is a 74 year old male pmhx of anterior STEMI s/p PCI to the pLAD on 10/26/23 with a VT/VF arrest the next day (10/27) Placed on amiodarone and discharged 11/03/23, ICD was not indicated as this was within 48h of his MI. His EF prior to discharge was 20-30% with a large area of akinesis in the LAD, placed on apixaban due to this (Apixaban dcd on 7/5/24).   Had multiple admissions for HF exacerbation     Admitted on 8/30/24 for CHF exacerbation with BNP 16k. CPX and RHC showed significant functional limitations due to heart failue. Underwent HM3 LVAD on 9/18/24 needed mediastinal re-exploration for post operative hemorrhage.   Transferred to ARU on 10/5 for rehab        10/13  - continue with fluctuation PI, now flow and MAPS  - received 500 ml fluid bolus  10/12   - 10/13 8 AM MAP 94, post medications at 10 AM 92   -flow ~ 2.8-3 , PI fluctuates and had been as high as 10.1 early AM , while I was in room was going between 6.5 and 8.2   - weight down  - difficult historian  as when I asked him this AM if he was dizzy  he did not remember complains of  dizziness \" off balance \"   -discussed with  cardiology  and recommended to increase his hydralazine to 25 mg tid and give a 12.5 mg x 1 now  call out to cardiology  ,   - no signs of infection   - had echo 10/10 see teresa   --discussed with  ARU team and patient  with ongoing issues, not participating with therapy,still with symptoms , would benefit form readmission to hospital for medication/fluid  adjustment , cardiology  will let me know reg this       Very fluctuating  LVAD numbers ( physical therapy  , now flow and MAPS)   Weakness   Dizziness  - has had issues all week with PI s dizziness, with occasional  lpw then high MAPs   -  now X cover called over night 10/13 as patient  has had low " "flow alarms go off and had discussed with  cardiology  but no changes were recommended   - cardiology  have been contacted daily throughout week   - received fluid bolus 10/13 again   - did reach  out to cardiology        #Weakness  #headache,  tingling in fingers  -10/9  post therapy that resolved after got back to bed. Code stroke cancelled but then cancelled as symptoms resolved and had no focal deficits   - discussed with  cardiology  and gave 500 ml fluid bolus 10/9  - head CT was negative for acute intracranial process   -  10/9 EKG  sinus  - monitor for infection  , no leukocytosis, no fever noted   - 10/10 after got up post BM had some left arm \" clumsiness\"  now gone, did have headache now also gone. He is not the best historian  -states he heard alarm around 4 AM, this AM had MAP 72 then post medications 60-- 58, flow 3.7 PI 3.1   - hydralazin held after AM dose 10/9 , see bellow   - BMP CBC stable, INR therapeutic       # Acute on chronic systolic heart failure secondary to ICM   #HFrEF  s/p HM3 LVAD as DT on 9/18/2024  # CAD s/p PCI  # History of STEMI, CAD status post  PCI to LAD in 2023   # s/p ICD 5/8/2024 with history VT/VF arrest 2023   -Stage D. NYHA Class III.  - gets some shortness of breath  with therapy but not at rest    - had low PI 10/9 ad cardiology  was notified, monitor closely and call cardiology  if remain < 2  . Has had PI 1.6 7:50 AM with thapy and post recheck was 4.4 then again had 1.7  9;42  then 2.8 at 10    - his PI fluctuate, has small LV per cardiology    - goal MAP 65-85      -  10/9  EKG  sinus  rhythm   - 10/9 500 ml fluid bolus   - speed decreased to 5100  Fluid status: Bumex to 0.5 mg daily. ( Has been on hold )   ACEi/ARB/ARNi: 50 mg losartan BID  Vasodilator: Hydralazine 50 mg TID increased to 75 mg tid 10/7  and held form 10/9  and restarted 12.5 mg TID 10/11 --- 25 mg tid 10/13    Received extra dose of hydralazine 25 mg 10/7    BB: Recent LVAD, not on any "   Aldosterone antagonist deferred while other medical therapy is prioritized  SGLT2i:  Empagliflozin 10 mg   SCD prophylaxis: ICD  MAP: goal 65-85,   LDH trends: 230, stable  Anticoagulation: warfarin dosing per pharmacy, INR goal 2-3,   Antiplatelet: ASA not indicated in LVAD population, > 6 months s/p STEMI     -Patient  has not been able to participate in physical therapy  due to feeling dizzy when gets up , I was by bedside, he does endorse some dizziness while  sitting up, MAP 66 while  sitting up, there is concerns weather patient  needs to return to the hospital as he cannot participate in therapies    -Weights 10/5 66.8---64.1 ( bedside scale)  --63.3 ( standing scale) --64.8 ---61.96 10/10 on standing scale --- 63 kg ( 10/12)  in bed scale ---60.2 1-/13 bed scale       # Low flow alarms, low PI , PI fluctuating   # HTN  Multiple low flow alarms 10/1, none reported 10/2. Verified that doppler measure is MAP vs. SBP.   - Speed optimization echo performed 10/1, speed decreased to 5100  - Losartan 50 mg BID  - Hydralazine 50 mg TID--> 75 mg TID  10/7 ---12,5 mg TID 10/11-  - continue to  monitor closely     - echo 10/10:  Left ventricular function is decreased. The ejection fraction is 15-20%  (severely reduced).  LVAD inflow and outflow cannula Dopplers are normal.  Septum bows towards the RV.  Global right ventricular function is moderately reduced.  Aortic valve opens with each cardiac cycle.  The inferior vena cava is normal.        #hyponatremia  - Na  hovering ~ 130      # History of VT/VF arrest 2023  # AFib s/p DCCV 9/5/2024 and again 9/30/2024  - Successfully cardioverted in early September for AF. Since then EKG suggested AF on 9/21. Tele is only available from as early as 9/22. The patient was started on hep gtt 9/21, but unclear if was in AF before this. While the patient was on hep gtt until INR was therapeutic, it is not possible to assess rhythm prior to 9/21. Systemic AC was off on 9/18-9/20.  S/p MELBA and DCCV on 9/30 with conversion to sinus rhythm   - continue amiodarone 200 mg daily      # Visual changes, resolved.   -Stroke code call 9/29 for visual changes - right eye with decreased upper half of vision and bluish haze. Resolved after 30 minutes. Seen by opthalmology and neuro. Negative head CT and CTA head and neck. He has had 3 episodes which last about 5 minutes before resolving completely.   - Ophthalmology and Neuro, signed off  - Already on A/C  - Continue to monitor for changes     # Hypervolemic hyponatremia  Na 132, ranging 128-133  - encourage PO intake  - Bumex 0.5 mg daily now on hold , last received 10/5  , discussed with   cardiology and restart if start gaining weight or signs of  volume increase     - did get 500 ml fluid bolus 10/9      # Anemia  - Hg  stable in 8 range   - Iron studies suggestive of RADHA. Received IV venofer x5 days last day 10/2     # Bilateral pleural effusions  - Chest tube removed 9/30.   - Left thoracentesis done 10/1, 1 L removed, R thoracentesis done 10/3, 1 L removed.       #History localized prostate cancer, [place] 4+7=7   - was not felt to be surgical candidate die to his heart disease   - received  Lupron q 3 months seems  received last dose this  year , radiation therapy     10/ 8-9-10-11 discussed with  cardiology   also discussed with  ARU team , Rns       Diet: Room Service  Combination Diet Regular Diet; Easy to Chew (level 7); Thin Liquids (level 0)  Snacks/Supplements Adult: Ensure Enlive; With Meals  Snacks/Supplements Adult: Magic Cup; With Meals  Snacks/Supplements Adult: Other; Cottage cheese + strawberry Greek yogurt TID with meals; With Meals    DVT Prophylaxis: Warfarin  Michael Catheter: Not present  Lines: None     Cardiac Monitoring: None  Code Status: Full Code      Clinically Significant Risk Factors         # Hyponatremia: Lowest Na = 131 mmol/L in last 2 days, will monitor as appropriate       # Hypoalbuminemia: Lowest albumin = 2.6  g/dL at 10/9/2024  7:28 PM, will monitor as appropriate        # End stage heart failure: Ventricular assist device (VAD) present           # Severe Malnutrition: based on nutrition assessment    # Financial/Environmental Concerns:     # ICD device present          Jenniffer Rowland MD  Hospitalist Service  [facility]  Securely message with [employer] (more info)  Text page via [employer] Paging/Directory   ______________________________________________________________________    Interval History   Again had issues with PI MAPs over night, x cover had to contact cardiology, patient  is a very poor historian, does not remember complains of  dizziness,  denies chest pain  no shortness of breath  no nausea vomiting     Physical Exam   Vital Signs: Temp: 98.5  F (36.9  C) Temp src: Oral BP: 109/87 Pulse: 93   Resp: 20 SpO2: 97 % O2 Device: None (Room air)    Weight: 132 lbs 11.47 oz  General appearence: awake alert  in  no apparent distress      RESPIRATORY: lungs clear  , LVAD sounds on left    CARDIOVASCULAR:  LVAD sounds   GASTROINTESTINAL:soft, non-distended , non-tender , + bowel sounds, also LVAD sounds     SKIN: warm and dry, no mottling noted   NEUROLOGIC; awake alert and oriented, no focal deficits noted   EXTREMITIES: no clubbing, cyanosis or edema ,      Data     I have personally reviewed the following data over the past 24 hrs:    5.9  \   9.5 (L)   / 212     131 (L) 98 20.3 /  99   4.3 24 0.68 \     INR:  2.33 (H) PTT:  N/A   D-dimer:  N/A Fibrinogen:  N/A       Imaging results reviewed over the past 24 hrs:   No results found for this or any previous visit (from the past 24 hour(s)).

## 2024-10-13 NOTE — PLAN OF CARE
"  Discharge Plan: home w/ spouse assist     Precautions: fall, LVAD- monitor for low PI's      Current Status:  Bed Mobility: Maria Esther rolling, Sup<>sit SBA w/ bedrail   Transfer: SBA w/ 4WW   Gait: Up to 200' SBA w/ 4WW   Stairs: 6\" x4 w/ joel railings, CGA  Balance: Stable dynamic sitting, SBA for unsupported dynamic standing      Outcome Measures:   5TSTS (modified, light UE push off): 32 seconds on 10/9  6MWT: max amb distance 150' on 10/9.      Assessment: Pt with initial PI variability 1.8-6 at start of session with standing exercises, over time stabilizing to 2.5-4.0 range with exercises. Progressing back to walking 75' bouts around room to have consistent monitoring with PI again not dropping <2.5 with walking or step ups. Planning for family training tomorrow PM.       Other Barriers to Discharge (DME, Family Training, etc):   3STE w/ railings. 10STI w/ chair lift available     Family training: 10/14 PM     4WW: needed prior to discharge  "

## 2024-10-13 NOTE — PROGRESS NOTES
Brief Cardiology Note  Notified that Mr Duane C Johnson was experiencing low flow alarms this morning in the setting of elevated MAPs to upper 90s and low 100s. He is also struggling to participate in therapy, possibly due to lightheadedness although subjective information from patient has been inconsistent.     In terms of the low flow LVAD alarms, recommended giving 12.5 mg hydralazine and increasing TID dose to 25 mg. Prior low flow alarms have been in the setting of elevated MAPs so suspect this will improve LVAD flow.     In terms of dizziness, MAPs do not substantially drop during reported dizziness, reportedly staying between 65 and 70. Regardless, the patient's participation in therapy has been significantly limited at least in part due to these symptoms. Recommend monitoring symptoms with improved LVAD flows and better overall MAP control. The case was discussed with the Cardiology 2 Heart Failure attending physician who agrees with this plan. There is no current indication for hospital admission however the clinical situation will be monitored closely and revisited daily.     Juan Diego Weaver MD [facility], PGY-6  Fellow, Cardiovascular Disease

## 2024-10-13 NOTE — PROGRESS NOTES
Discharge Planner Post-Acute Rehab OT:      Discharge Plan: home with wife, HH OT.      Precautions: falls, sternal, cardiac, LVAD, short term memory deficits     Current Status:  ADLs:  Mobility: S/SBA WWW if on wall power, nearing mod IND if on battery power  Grooming: IND standing at sink  Dressing: UB - IND LB - IND with 4WW  Bathing: Mod I with FB spongebath seated at EOB  Toileting: Supervision with 4WW  IADLs: TBD. Supportive spouse who can assist  Vision/Cognition: Pt scored 17/30 on SLUMs in 10/3/24. ACE-III on 10/8/24: 74/100 indicating cognitive deficits      Assessment:   Completed sponge bath EOB, minimal dizziness with high fluctuating PI from 5.6 to 10.2. Dropped from 9.3 to 5.6 during standing. GG updated.     Other Barriers to Discharge (DME, Family Training, etc):   Family training prior to discharge with spouse  AE/DME: Shower chair

## 2024-10-13 NOTE — PLAN OF CARE
Goal Outcome Evaluation:      Plan of Care Reviewed With: patient    Overall Patient Progress: no changeOverall Patient Progress: no change    Outcome Evaluation: No change    Pt A&Ox4, no pain, 1a walker gaitbelt, easy to chew diet with thin liquids pills whole, continent of bowel and bladder last BM 10/11, Sternal incision and old chest tube sites with LVAD, dressing change done. L PIV. Bed lowered and call light within reach.

## 2024-10-13 NOTE — PLAN OF CARE
Goal Outcome Evaluation:    Overall Patient Progress: no change    Outcome Evaluation: No change in Pt progress this shift.    Pt is alert and oriented. Can take pills whole. Continent of B&B. [facility] 10/12. Ax1 walker. Denied pain, SOB, CP, dizziness and n/t. MAP was elevated throughout the shift. LVAD numbers were WDL initially, but started fluctuating throughout the second half of the shift - low flow alarm triggered multiple times. See flowsheets. Pt asymptomatic. Paged Hospitalist. On-call cardiologist consulted. Continue to monitor. Call light within reach and bed alarms on. Will continue with POC.

## 2024-10-13 NOTE — PROGRESS NOTES
"Anticipate a discharge early this week, pending medical. SW met with pt to complete IRF and IMM. SW reviewed [facility] home care services, frequency, and what to expect. Pt asked SW what he could do if he needs more help when he gets home, SW will make a PCP CC referral as well to help with the care transition.     [facility]  [address]   [place] [address]  PH: [phone]  PT/OT/RN    IRF-ARTUR Pain Assessment    Pain Effect on Sleep  \"Over the past 5 days, how much of the time has pain made it hard for you to sleep at night?\"    0. Does not apply - I have not had any pain or hurting in the past 5 days    DMITRY Salinas  Post Acute Float   [facility]    Phone: [phone]  Fax: [phone]    "

## 2024-10-14 ENCOUNTER — APPOINTMENT (OUTPATIENT)
Dept: OCCUPATIONAL THERAPY | Facility: CLINIC | Age: 74
DRG: 949 | End: 2024-10-14
Attending: PHYSICAL MEDICINE & REHABILITATION
Payer: MEDICARE

## 2024-10-14 ENCOUNTER — APPOINTMENT (OUTPATIENT)
Dept: PHYSICAL THERAPY | Facility: CLINIC | Age: 74
DRG: 949 | End: 2024-10-14
Attending: PHYSICAL MEDICINE & REHABILITATION
Payer: MEDICARE

## 2024-10-14 ENCOUNTER — APPOINTMENT (OUTPATIENT)
Dept: SPEECH THERAPY | Facility: CLINIC | Age: 74
DRG: 949 | End: 2024-10-14
Attending: PHYSICAL MEDICINE & REHABILITATION
Payer: MEDICARE

## 2024-10-14 LAB
ANION GAP SERPL CALCULATED.3IONS-SCNC: 9 MMOL/L (ref 7–15)
BUN SERPL-MCNC: 19.1 MG/DL (ref 8–23)
CALCIUM SERPL-MCNC: 8.6 MG/DL (ref 8.8–10.4)
CHLORIDE SERPL-SCNC: 97 MMOL/L (ref 98–107)
CREAT SERPL-MCNC: 0.53 MG/DL (ref 0.67–1.17)
EGFRCR SERPLBLD CKD-EPI 2021: >90 ML/MIN/1.73M2
ERYTHROCYTE [DISTWIDTH] IN BLOOD BY AUTOMATED COUNT: 16.9 % (ref 10–15)
GLUCOSE SERPL-MCNC: 101 MG/DL (ref 70–99)
HCO3 SERPL-SCNC: 25 MMOL/L (ref 22–29)
HCT VFR BLD AUTO: 30.9 % (ref 40–53)
HGB BLD-MCNC: 10 G/DL (ref 13.3–17.7)
INR PPP: 2.38 (ref 0.85–1.15)
LDH SERPL L TO P-CCNC: 223 U/L (ref 0–250)
MCH RBC QN AUTO: 31.2 PG (ref 26.5–33)
MCHC RBC AUTO-ENTMCNC: 32.4 G/DL (ref 31.5–36.5)
MCV RBC AUTO: 96 FL (ref 78–100)
PLATELET # BLD AUTO: 189 10E3/UL (ref 150–450)
POTASSIUM SERPL-SCNC: 4.3 MMOL/L (ref 3.4–5.3)
RBC # BLD AUTO: 3.21 10E6/UL (ref 4.4–5.9)
SODIUM SERPL-SCNC: 131 MMOL/L (ref 135–145)
WBC # BLD AUTO: 6.1 10E3/UL (ref 4–11)

## 2024-10-14 PROCEDURE — 250N000013 HC RX MED GY IP 250 OP 250 PS 637

## 2024-10-14 PROCEDURE — 250N000013 HC RX MED GY IP 250 OP 250 PS 637: Performed by: NURSE PRACTITIONER

## 2024-10-14 PROCEDURE — 97535 SELF CARE MNGMENT TRAINING: CPT | Mod: GO | Performed by: OCCUPATIONAL THERAPIST

## 2024-10-14 PROCEDURE — 250N000013 HC RX MED GY IP 250 OP 250 PS 637: Performed by: INTERNAL MEDICINE

## 2024-10-14 PROCEDURE — 128N000003 HC R&B REHAB

## 2024-10-14 PROCEDURE — 85027 COMPLETE CBC AUTOMATED: CPT | Performed by: INTERNAL MEDICINE

## 2024-10-14 PROCEDURE — 97129 THER IVNTJ 1ST 15 MIN: CPT | Mod: GN

## 2024-10-14 PROCEDURE — 97130 THER IVNTJ EA ADDL 15 MIN: CPT | Mod: GN

## 2024-10-14 PROCEDURE — 250N000013 HC RX MED GY IP 250 OP 250 PS 637: Performed by: PHYSICAL MEDICINE & REHABILITATION

## 2024-10-14 PROCEDURE — 36415 COLL VENOUS BLD VENIPUNCTURE: CPT

## 2024-10-14 PROCEDURE — 85610 PROTHROMBIN TIME: CPT

## 2024-10-14 PROCEDURE — 80048 BASIC METABOLIC PNL TOTAL CA: CPT | Performed by: INTERNAL MEDICINE

## 2024-10-14 PROCEDURE — 83615 LACTATE (LD) (LDH) ENZYME: CPT | Performed by: INTERNAL MEDICINE

## 2024-10-14 PROCEDURE — 97530 THERAPEUTIC ACTIVITIES: CPT | Mod: GP

## 2024-10-14 PROCEDURE — 99232 SBSQ HOSP IP/OBS MODERATE 35: CPT | Performed by: INTERNAL MEDICINE

## 2024-10-14 PROCEDURE — 97530 THERAPEUTIC ACTIVITIES: CPT | Mod: GO

## 2024-10-14 RX ORDER — WARFARIN SODIUM 2.5 MG/1
2.5 TABLET ORAL
Status: COMPLETED | OUTPATIENT
Start: 2024-10-14 | End: 2024-10-14

## 2024-10-14 RX ADMIN — METHOCARBAMOL 750 MG: 750 TABLET ORAL at 13:08

## 2024-10-14 RX ADMIN — Medication 1 TABLET: at 20:21

## 2024-10-14 RX ADMIN — LOSARTAN POTASSIUM 50 MG: 50 TABLET, FILM COATED ORAL at 20:21

## 2024-10-14 RX ADMIN — Medication 25 MG: at 22:02

## 2024-10-14 RX ADMIN — GABAPENTIN 100 MG: 100 CAPSULE ORAL at 20:21

## 2024-10-14 RX ADMIN — METHOCARBAMOL 750 MG: 750 TABLET ORAL at 20:21

## 2024-10-14 RX ADMIN — AMIODARONE HYDROCHLORIDE 200 MG: 200 TABLET ORAL at 07:48

## 2024-10-14 RX ADMIN — METHOCARBAMOL 750 MG: 750 TABLET ORAL at 07:48

## 2024-10-14 RX ADMIN — Medication 1 TABLET: at 07:49

## 2024-10-14 RX ADMIN — Medication 25 MG: at 13:08

## 2024-10-14 RX ADMIN — Medication 10 MG: at 20:21

## 2024-10-14 RX ADMIN — ESCITALOPRAM OXALATE 10 MG: 10 TABLET ORAL at 07:48

## 2024-10-14 RX ADMIN — PANTOPRAZOLE SODIUM 40 MG: 40 TABLET, DELAYED RELEASE ORAL at 05:32

## 2024-10-14 RX ADMIN — LOSARTAN POTASSIUM 50 MG: 50 TABLET, FILM COATED ORAL at 07:49

## 2024-10-14 RX ADMIN — ATORVASTATIN CALCIUM 80 MG: 80 TABLET, FILM COATED ORAL at 20:21

## 2024-10-14 RX ADMIN — WARFARIN SODIUM 2.5 MG: 2.5 TABLET ORAL at 18:10

## 2024-10-14 RX ADMIN — MAGNESIUM OXIDE TAB 400 MG (241.3 MG ELEMENTAL MG) 400 MG: 400 (241.3 MG) TAB at 07:48

## 2024-10-14 RX ADMIN — EMPAGLIFLOZIN 10 MG: 10 TABLET, FILM COATED ORAL at 07:48

## 2024-10-14 RX ADMIN — Medication 25 MG: at 07:48

## 2024-10-14 ASSESSMENT — ACTIVITIES OF DAILY LIVING (ADL)
ADLS_ACUITY_SCORE: 39
ADLS_ACUITY_SCORE: 39
ADLS_ACUITY_SCORE: 40
ADLS_ACUITY_SCORE: 39
ADLS_ACUITY_SCORE: 39
ADLS_ACUITY_SCORE: 40
ADLS_ACUITY_SCORE: 40
ADLS_ACUITY_SCORE: 39
ADLS_ACUITY_SCORE: 39
ADLS_ACUITY_SCORE: 40
ADLS_ACUITY_SCORE: 40
ADLS_ACUITY_SCORE: 39
ADLS_ACUITY_SCORE: 40
ADLS_ACUITY_SCORE: 39
ADLS_ACUITY_SCORE: 39
ADLS_ACUITY_SCORE: 40
ADLS_ACUITY_SCORE: 39
ADLS_ACUITY_SCORE: 40

## 2024-10-14 NOTE — PROGRESS NOTES
"  [facility]   Acute Rehabilitation Unit  Daily Progress Note    INTERVAL HISTORY  Notes and labs reviewed over past 24 hours. On call physician paged overnight regarding elevated MAPs and decreased flow. He feels that he has been drinking 32-48 oz water daily.    This AM, patient was attempting to ambulate with therapies and experienced a pre-syncopal episode. During this time, Mr. Aguila was walking with his walker, and upon turning around, began to feel lightheaded, weak, and dizzy. He fell backwards but fortunately his walker was present to catch his fall. Throughout the remainder of the day, the patient continued to experience intermittent drops in flow/PI with MAPs ranging outside (both low and high) of his goal parameters.    As a result of continued hemodynamic instability, lightheadedness/presyncope, and generalized weakness, Mr. Aguila has now had 6 days with limited ability to participate in therapies.     Cardiology was contacted by Mr. Aguila's hospitalist team. Hydralazine scheduling was adjusted from TID to q8h.     ROS: 10 point ROS was assessed and was negative unless otherwise stated in HPI      Functionally,    With PT: 10/13  Current Status:  Bed Mobility: Maria Esther rolling, Sup<>sit SBA w/ bedrail   Transfer: SBA w/ 4WW   Gait: Up to 200' SBA w/ 4WW   Stairs: 6\" x4 w/ joel railings, CGA  Balance: Stable dynamic sitting, SBA for unsupported dynamic standing   Outcome Measures:   5TSTS (modified, light UE push off): 32 seconds on 10/9  6MWT: max amb distance 150' on 10/9.     With OT: 10/13  Current Status:  ADLs:  Mobility: S/SBA WWW if on wall power, nearing mod IND if on battery power  Grooming: IND standing at sink  Dressing: UB - IND LB - IND with 4WW  Bathing: Mod I with FB spongebath seated at EOB  Toileting: Supervision with 4WW  IADLs: TBD. Supportive spouse who can assist  Vision/Cognition: Pt scored 17/30 on SLUMs in 10/3/24. ACE-III on 10/8/24: 74/100 " indicating cognitive deficits     SLP: 10/14  Current Status:  Hearing: Increased volume  Vision: Readers  Communication: Motor speech, language intact  Cognition: Mild cognitive impairment- severe short term memory deficits  Swallow: Easy to chew solids (7)/Thin liquids (0). Pt reported due to lack of dentition, only wants to eat chicken noodle soup and mashed potatoes. Clinical swallow evaluation not indicated at this time, MD can manage diet texture as it is related to dentition status only.     MEDICATIONS  Scheduled meds  Current Facility-Administered Medications   Medication Dose Route Frequency Provider Last Rate Last Admin    amiodarone (PACERONE) tablet 200 mg  200 mg Oral Daily Toussaint Gonzalez, Paola, MD   200 mg at 10/14/24 0748    atorvastatin (LIPITOR) tablet 80 mg  80 mg Oral QPM Toussaint Gonzalez, Paola, MD   80 mg at 10/13/24 2004    empagliflozin (JARDIANCE) tablet 10 mg  10 mg Oral Daily Sirisha Arias APRN CNP   10 mg at 10/14/24 0748    escitalopram (LEXAPRO) tablet 10 mg  10 mg Oral or Feeding Tube Daily Toussaint Gonzalez, Paola, MD   10 mg at 10/14/24 0748    gabapentin (NEURONTIN) capsule 100 mg  100 mg Oral or Feeding Tube At Bedtime Toussaint Gonzalez, Paola, MD   100 mg at 10/13/24 2004    hydrALAZINE (APRESOLINE) half-tab 25 mg  25 mg Oral TID Jenniffer Rowland MD   25 mg at 10/14/24 0748    losartan (COZAAR) tablet 50 mg  50 mg Oral BID Toussaint Gonzalez, Paola, MD   50 mg at 10/14/24 0749    magnesium oxide (MAG-OX) tablet 400 mg  400 mg Oral Daily Toussaint Gonzalez, Paola, MD   400 mg at 10/14/24 0748    melatonin tablet 10 mg  10 mg Oral At Bedtime Toussaint Gonzalez, Paola, MD   10 mg at 10/13/24 2004    methocarbamol (ROBAXIN) tablet 750 mg  750 mg Oral TID Toussaint Gonzalez, Paola, MD   750 mg at 10/14/24 0748    multivitamin w/minerals (THERA-VIT-M) tablet 1 tablet  1 tablet Oral BID Toussaint Gonzalez, Paola, MD   1 tablet at 10/14/24 0749    pantoprazole (PROTONIX) EC  tablet 40 mg  40 mg Oral QAM AC Toussaint Gonzalez, Paola, MD   40 mg at 10/14/24 0532    Warfarin Dose Required Daily - Pharmacist Managed  1 each Oral See Admin Instructions Toussaint Gonzalez, Paola, MD           PRN meds:  Current Facility-Administered Medications   Medication Dose Route Frequency Provider Last Rate Last Admin    acetaminophen (TYLENOL) tablet 975 mg  975 mg Oral Q8H PRN Roberto Carlos Braswell MD        bisacodyl (DULCOLAX) suppository 10 mg  10 mg Rectal Daily PRN Toussaint Gonzalez, Paola, MD        lidocaine (LMX4) cream   Topical Q1H PRN Toussaint Gonzalez, Paola, MD        lidocaine 1 % 0.1-1 mL  0.1-1 mL Other Q1H PRN Toussaint Gonzalez, Paola, MD        naloxone (NARCAN) injection 0.2 mg  0.2 mg Intravenous Q2 Min PRN Toussaint Gonzalez, Paola, MD        Or    naloxone (NARCAN) injection 0.4 mg  0.4 mg Intravenous Q2 Min PRN Toussaint Gonzalez, Paola, MD        Or    naloxone (NARCAN) injection 0.2 mg  0.2 mg Intramuscular Q2 Min PRN Toussaint Gonzalez, Paola, MD        Or    naloxone (NARCAN) injection 0.4 mg  0.4 mg Intramuscular Q2 Min PRN Toussaint Gonzalez, Paola, MD        ondansetron (ZOFRAN ODT) ODT tab 4 mg  4 mg Oral Q6H PRN Toussaint Gonzalez, Paola, MD        oxyCODONE (ROXICODONE) tablet 5 mg  5 mg Oral Q4H PRN Toussaint Gonzalez, Paola, MD        Patient is already receiving anticoagulation with heparin, enoxaparin (LOVENOX), warfarin (COUMADIN)  or other anticoagulant medication   Does not apply Continuous PRN Toussaint Gonzalez, Paola, MD        polyethylene glycol (MIRALAX) Packet 17 g  17 g Oral or Feeding Tube BID PRN Toussaint Gonzalez, Paola, MD   17 g at 10/07/24 2159    senna-docusate (SENOKOT-S/PERICOLACE) 8.6-50 MG per tablet 2 tablet  2 tablet Oral or Feeding Tube BID PRN Toussaint Gonzalez, Paola, MD   2 tablet at 10/10/24 0817    sodium chloride (PF) 0.9% PF flush 3 mL  3 mL Intracatheter q1 min prn Toussaint Estuardo, Shakira, MD             PHYSICAL EXAM  /87  "(BP Location: Right arm)   Pulse 82   Temp 98.2  F (36.8  C) (Oral)   Resp 17   Ht 1.676 m (5' 6\")   Wt 59.8 kg (131 lb 13.4 oz)   SpO2 96%   BMI 21.28 kg/m    General: Intermittently lightheaded, conversational and alert, forgetful, resting in bed  HEENT: atraumatic, nares clear, conjunctiva white  Pulmonary: on room air, lungs clear to auscultation bilaterally  Cardiovascular: LVAD hum  Abdominal: soft, non-tender to palpation, non-distended  Extremities: warm peripherally, no edema in BLEs  Skin: warm, dry, without erythema, ecchymosis, or rash noted  MSK: no BLE edema noted, calves non-tender to palpation  Neuro: Alert. Confused at times     LABS  Results for orders placed or performed during the hospital encounter of 10/05/24 (from the past 24 hour(s))   INR   Result Value Ref Range    INR 2.38 (H) 0.85 - 1.15   CBC with platelets   Result Value Ref Range    WBC Count 6.1 4.0 - 11.0 10e3/uL    RBC Count 3.21 (L) 4.40 - 5.90 10e6/uL    Hemoglobin 10.0 (L) 13.3 - 17.7 g/dL    Hematocrit 30.9 (L) 40.0 - 53.0 %    MCV 96 78 - 100 fL    MCH 31.2 26.5 - 33.0 pg    MCHC 32.4 31.5 - 36.5 g/dL    RDW 16.9 (H) 10.0 - 15.0 %    Platelet Count 189 150 - 450 10e3/uL   Basic metabolic panel   Result Value Ref Range    Sodium 131 (L) 135 - 145 mmol/L    Potassium 4.3 3.4 - 5.3 mmol/L    Chloride 97 (L) 98 - 107 mmol/L    Carbon Dioxide (CO2) 25 22 - 29 mmol/L    Anion Gap 9 7 - 15 mmol/L    Urea Nitrogen 19.1 8.0 - 23.0 mg/dL    Creatinine 0.53 (L) 0.67 - 1.17 mg/dL    GFR Estimate >90 >60 mL/min/1.73m2    Calcium 8.6 (L) 8.8 - 10.4 mg/dL    Glucose 101 (H) 70 - 99 mg/dL   Lactate Dehydrogenase   Result Value Ref Range    Lactate Dehydrogenase 223 0 - 250 U/L       ASSESSMENT AND PLAN  Duane C Johnson is a 74 year old right hand dominant male with a PMH of HFrEF 2/2 ICM with LVEF 20-30%, atrial fibrillation, 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 Due to worsening functional limitations due to heart failure, patient was worked up for VAD while inpatient. Patient is now s/p implantation of HeartMate 3 LVAD on 9/18/2024. His post-operative course was complicated by Afib, pain, anxiety, insomnia, and transient right eye visual deficit. He has also required medical mgmt of his acute blood loss anemia, fluid overload, hyperglycemia, hyponatremia and B pleural effusions. He is now medically stable, admitted to rehab on 10/05/2024 in setting of impaired strength, impaired balance and impaired activity tolerance.     Updates Today:  - Low Flow: Continued hemodynamic instability resulting in an episode of presyncope today with fall into FWW. Concern remains that he is not safe to ambulate n therapies in his current state. Cardiology was contacted regarding possible transfer (as discussed over the weekend). Recommended changing hydralazine scheduling from TID to q8h. Current thought is that his volume status equilibrate with this more consistent scheduling.   - Disposition: Unclear. Patient not currently medically stable for discharge as lightheadedness/presyncope limits his ability to participate in PT/OT for home safety evaluation.      Admission to acute inpatient rehab 10/05/2024.    Impairment group code: 09 Cardiac: s/p Heartmate III LVAD placement in setting of acute on chronic systolic heart failure d/t ischemic cardiomyopathy      PT, OT and SLP 60 minutes of each, 6 days a week for 12 days,  in addition to rehab nursing and close management of physiatrist.    Impairment of ADL's: OT for 6 days a week for 12 days, to work on ADL re-training such as grooming, self cares and bathing.   Impairment of mobility:  PT  for 6 days a week for 12 days, to work on neuromuscular re-education focusing on strength, balance, coordination, and endurance.    Impairment of cognition/language/swallow:  SLP for 6 days a week for 12 days, to  work on cognitive and linguistic deficits.  Rehab RN for med administration, bowel regimen, glucose monitoring and wound care.       Medical Conditions  # Acute on chronic systolic heart failure secondary to ICM   #HFrEF  s/p HM3 LVAD as DT on 9/18/2024  # CAD s/p PCI  # History of STEMI, s/p PCI to LAD in 2023   # s/p ICD 5/8/2024, hxVT/VF arrest   Stage D. NYHA Class III. Limited TTE 9/27 with LVIDd 4.0 cm, Closed AoV, no AI, moderately reduced RV, normal IVC. TTE (10/10) demonstrates septum bowing into RV. LV measures 4.7 cm. Intermittently has been triggering LVAD alarms, likely dt hypovolemia.   -Continue 50 mg losartan BID  -Hydralazine 50 mg increased to 75 mg TID. Changed to 12.5 mg TID on 10/11. Changed to 25mg 18h on 10/14.   -Continue Empagliflozin 10 mg QD  -Continue Anticoagulation: warfarin dosing per pharmacy, INR goal 2-3.   Antiplatelet: ASA not indicated in LVAD population, > 6 months s/p STEMI     # Hyperlipidemia  -Continue atorvastatin 80mg      # History of VT/VF arrest 2023  # AFib s/p DCCV 9/2024 and again 9/30/2024  - Successfully cardioverted in early September for AF. Since then EKG suggested AF on 9/21. The patient was started on hep gtt 9/21, but unclear if was in AF before this. S/p MELBA and DCCV on 9/30 with conversion to sinus rhythm   -Continue amiodarone 200 mg daily   -Continue AC as above     # Visual changes, resolved.   # Generalized Weakness  Stroke code call 9/29 for visual changes - right eye with decreased upper half of vision and bluish haze. Resolved after 30 minutes. Seen by opthalmology and neuro. Negative head CT and CTA head and neck. Additionally had stroke code activated on 10/9 due to transient left upper extremity weakness. This resolved spontaneously and CT head returned negative.   - Already on A/C     # Iron Deficiency Anemia  - Hgb 8.4 10/4. No overt bleeds, slow downtrend since surgery. LDH downtrending.   - Iron studies suggestive of RADHA. Recieved IV venofer  x5 days  - CBC qMon/Thurs     #Pain  - Acetaminophen PRN  - Continue gabapentin 100 mg bedtime     # Hypervolemic hyponatremia  Ranging 128-133  - Holding PTA Bumex 0.5 mg daily  - Continue empagliflozin 10 mg daily      # Bilateral pleural effusions, Resolved  -Chest tube removed 9/30.   - Left thoracentesis done 10/1, 1 L removed, R thoracentesis done 10/3, 1 L removed.   -Continue incentive spirometer     Adjustment to disability:  Clinical psychology to eval and treat. Currently on escitalopram.   FEN: 0-Thin and 6-Soft and bite sized   Bowel: continent  Bladder: continent  DVT Prophylaxis: on warfarin  GI Prophylaxis: pantoprazole.   Code: Full code  Disposition: house with wife  ELOS:  Unclear.  Rehab prognosis:  Anticipate w/ intensive PT/OT, skilled rehab nursing cares, and medical mgmt by PMR and HOSP providers, pt will achieve a level of mod IND for bed mobility, transfers, gait, stairs, ADLs, and be able to safely and independently manage his Heartmate III LVAD.   Follow up Appointments on Discharge:   PCP  Ophthalmology  PM&R   Outpatient therapies  Cardiology     Seen and discussed with Dr. Fountain, PM&R staff physician     --   Roberto Carlos Braswell MD  [facility] PM&R PGY-2  Pager: [phone]

## 2024-10-14 NOTE — PROGRESS NOTES
Notified MD Dr. Tra Carrero at 01:22 AM regarding - ; LVAD parameters -- low flow 2.6; high P.I 8.6; speed 5150  Denies of chest pain/discomfort/dizziness.  Via paging system  0128am- As per Dr. Carrero continue to monitor and document results. No need to page hospitalist as per Dr. Carrero

## 2024-10-14 NOTE — PLAN OF CARE
Discharge Planning:     Patient's spouse came to unit to participate in LVAD dressing change education and she still requires another session with the LVAD coordinator.     Patient did not discharge today. LVAD Coordinator updated about current status and home care updated as well.     Tamiko Pantoja RN, BSN, CRRN  [facility] Patient Care Supervisor  Acute Rehabilitation Unit  PH: [phone]  Pager: [phone]

## 2024-10-14 NOTE — PLAN OF CARE
Goal Outcome Evaluation:      Plan of Care Reviewed With: patient    Overall Patient Progress: improvingOverall Patient Progress: improving     Orientation:alert and oriented; able to make needs known.   Patient with LVAD;driveline attached to wall power; batteries charged  MAP 92;  With episodes of abnormal result of LVAD parameters and MAP of 102; on call Dr. Carrero notified  O2 use: denies of SOB; on room air; not on respiratory distress  Pain:denies of pain  Diet:regular easy to chew level 7 thin liquids  Bladder:continent . Uses urinal at night  Bowel: no BM this shift; last BM 10/12/24  Ambulation/Transfer: A1 FWW  Tubes/Lines/Drains: LVAD- dressing CDI  Skin: see flowsheet  Safety: fall, sternal and abdominal precautions maintained

## 2024-10-14 NOTE — PLAN OF CARE
"Discharge Plan: home w/ spouse assist     Precautions: fall, LVAD- monitor for low PI's      Current Status:  Bed Mobility: Maria Esther rolling, Sup<>sit SBA w/ bedrail   Transfer: SBA w/ 4WW   Gait: Up to 200' SBA w/ 4WW   Stairs: 6\" x4 w/ joel railings, SBA  Balance: Stable dynamic sitting, SBA for unsupported dynamic standing      Outcome Measures:   5TSTS (modified, light UE push off): 32 seconds on 10/9  6MWT: max amb distance 150' on 10/9.      Assessment: Continues to experience consistent drop in PI to 1.5 with standing/gait. Pt symptomatic endorsing SOB and dizziness lingering for several minutes even after returning to sitting. 4WW ordered from [facility].    Other Barriers to Discharge (DME, Family Training, etc)   Caregiver connection with spouse: 10/14 PM- completed, however limited in OOB demonstrated in presence of low PI.     4WW: Ordered from [facility].   "

## 2024-10-14 NOTE — PLAN OF CARE
"Goal Outcome Evaluation:      Plan of Care Reviewed With: patient    Overall Patient Progress: no changeOverall Patient Progress: no change      VS: /87 (BP Location: Right arm)   Pulse 82   Temp 98.2  F (36.8  C) (Oral)   Resp 17   Ht 1.676 m (5' 6\")   Wt 59.8 kg (131 lb 13.4 oz)   SpO2 96%   BMI 21.28 kg/m       O2: SpO2 > 96 and stable on RA. Denies chest pain and SOB.    Output: Voids spontaneously without difficulty to bathroom / using beside urinal    Last BM: 10/11  denies abdominal discomfort. BS active / passing flatus.    Activity: Up with A1 walker and gait belt.    Skin: WDL except, LVAD    Pain: Denied pain    CMS: Intact, AOx4.    Dressing:     Diet: Easy to chew  diet. Denies nausea/vomiting.       LDA: L PIV SL    Equipment: IV pole, personal belongings,    Plan:  Continue with plan of care. Call light within reach, pt able to make needs known.    Additional Info: Call light within reach.          "

## 2024-10-14 NOTE — PLAN OF CARE
Goal Outcome Evaluation:      Plan of Care Reviewed With: patient    Overall Patient Progress: no changeOverall Patient Progress: no change    Outcome Evaluation: No change    Pt A&Ox4, no pain, 1a walker gaitbelt, easy to chew diet with thin liquids pills whole, continent of bowel and bladder last BM 10/12, Sternal incision and old chest tube sites with LVAD, dressing change done. L PIV. Bed lowered and call light within reach.

## 2024-10-14 NOTE — PLAN OF CARE
Discharge Planner Post-Acute Rehab SLP:     Discharge Plan: home with wife. Ongoing SLP TBD    Precautions: LVAD    Current Status:  Hearing: Increased volume  Vision: Readers  Communication: Motor speech, language intact  Cognition: Mild cognitive impairment- severe short term memory deficits  Swallow: Easy to chew solids (7)/Thin liquids (0). Pt reported due to lack of dentition, only wants to eat chicken noodle soup and mashed potatoes. Clinical swallow evaluation not indicated at this time, MD can manage diet texture as it is related to dentition status only.     Assessment: Pt did not recall memory strategies, but they were reviewed and pt completed immediate memory task with 80% using strategies. Pt completed deductive reasoning puzzle ([place] trip). Pt completed it initially with 70% accuracy. He duplicated one location and required a lot of extra time and min cues to identify the missing location and add it into the day correctly.     Other Barriers to Discharge (Family Training, etc): level of assist/supervision with meds, financial, and LVAD mgmt?

## 2024-10-14 NOTE — PROGRESS NOTES
Discharge TBD, pending medical readiness. Pt requested a PCP CC referral, SW placed a referral to follow up with pt once he discharges.     DMITRY Salinas  Post Acute Float   [facility]/TCU/[facility]    Phone: [phone]  Fax: [phone]

## 2024-10-14 NOTE — PROGRESS NOTES
Brief Cardiology Note  Notified that Mr Duane C Johnson was again experiencing low flow and PI event alarms this morning in the setting of elevated MAPs to upper 90s and low 100s. These resolved after administration of his morning dose of hydralazine.      On inspection of medication administration timing, suspect that the patient is experiencing rebound hypertension in the mornings due to TID rather than Q8hr dosing of hydralazine. We suspect that the morning hypertension and low-flow alarms will improve with more consistent hydralazine levels on a standard Q8hr dosing which avoids the prolonged period without medication evening until morning.     Recommend changing Hydralazine 25 mg TID to 25 mg Q8hrs. Additional blood pressure medications include Losartan 50 mg BID. We recommend keeping this medication as ordered. Other options for blood pressure control with daily or BID dosing include carvedilol and/or amlodipine/nifedipine which can be explored in the future.     This patient was discussed with Dr Alonso Valentine, attending cardiologist, who agrees with the assessment and plan unless otherwise indicated.    Juan Diego Weaver MD [facility], PGY-6  Fellow, Cardiovascular Disease

## 2024-10-14 NOTE — PROGRESS NOTES
"[facility]    Medicine Progress Note - Hospitalist Service    Date of Admission:  10/5/2024    Assessment & Plan   Duane C Johnson is a 74 year old male pmhx of anterior STEMI s/p PCI to the pLAD on 10/26/23 with a VT/VF arrest the next day (10/27) Placed on amiodarone and discharged 11/03/23, ICD was not indicated as this was within 48h of his MI. His EF prior to discharge was 20-30% with a large area of akinesis in the LAD, placed on apixaban due to this (Apixaban dcd on 7/5/24).   Had multiple admissions for HF exacerbation     Admitted on 8/30/24 for CHF exacerbation with BNP 16k. CPX and RHC showed significant functional limitations due to heart failue. Underwent HM3 LVAD on 9/18/24 needed mediastinal re-exploration for post operative hemorrhage.   Transferred to ARU on 10/5 for rehab        10/14  - patient  had near syncopal episode during therapy  - continue with fluctuating MAP, PI flow  - discussed with  Dr Esquivel recommended to switch hydralazin to q8 hr  and not tid as this  should help eliminate the rebound effect of  and should  see results in a day. At this point Dr Valentine does not feel that moving back to cardiology  unit is indicated  -continue to monitor closely. Dr Valentine is on till Friday if there are any further questions ,     10/13  - continue with fluctuation PI, now flow and MAPS  - received 500 ml fluid bolus  10/12   - 10/13 8 AM MAP 94, post medications at 10 AM 92   -flow ~ 2.8-3 , PI fluctuates and had been as high as 10.1 early AM , while I was in room was going between 6.5 and 8.2   - weight down  - difficult historian  as when I asked him this AM if he was dizzy  he did not remember complains of  dizziness \" off balance \"   -discussed with  cardiology  and recommended to increase his hydralazine to 25 mg tid and give a 12.5 mg x 1 now  call out to cardiology  ,   - no signs of infection   - had echo 10/10 see teresa   --discussed with  " "ARU team and patient  with ongoing issues, not participating with therapy,still with symptoms , would benefit form readmission to hospital for medication/fluid  adjustment  - per cardiology  notes  \"There is no current indication for hospital admission however the clinical situation will be monitored closely and revisited daily. \"      Very fluctuating  LVAD numbers ( physical therapy  , now flow and MAPS)   Weakness   Dizziness  - has had issues all week with PI s dizziness, with occasional  lpw then high MAPs   -  now X cover called over night 10/13 as patient  has had low flow alarms go off and had discussed with  cardiology  but no changes were recommended   - cardiology  have been contacted daily throughout week   - received fluid bolus 10/13 again   - per cardiology  10/13:   \"In terms of the low flow LVAD alarms, recommended giving 12.5 mg hydralazine and increasing TID dose to 25 mg. Prior low flow alarms have been in the setting of elevated MAPs so suspect this will improve LVAD flow.    In terms of dizziness, MAPs do not substantially drop during reported dizziness, reportedly staying between 65 and 70. Regardless, the patient's participation in therapy has been significantly limited at least in part due to these symptoms. Recommend monitoring symptoms with improved LVAD flows and better overall MAP control.\"      #Weakness  #headache,  tingling in fingers  -10/9  post therapy that resolved after got back to bed. Code stroke cancelled but then cancelled as symptoms resolved and had no focal deficits   - discussed with  cardiology  and gave 500 ml fluid bolus 10/9  - head CT was negative for acute intracranial process   -  10/9 EKG  sinus  - monitor for infection  , no leukocytosis, no fever noted   - 10/10 after got up post BM had some left arm \" clumsiness\"  now gone, did have headache now also gone. He is not the best historian  -states he heard alarm around 4 AM, this AM had MAP 72 then post medications " 60-- 58, flow 3.7 PI 3.1   - hydralazin held after AM dose 10/9 , see bellow   - BMP CBC stable, INR therapeutic       # Acute on chronic systolic heart failure secondary to ICM   #HFrEF  s/p HM3 LVAD as DT on 9/18/2024  # CAD s/p PCI  # History of STEMI, CAD status post  PCI to LAD in 2023   # s/p ICD 5/8/2024 with history VT/VF arrest 2023   -Stage D. NYHA Class III.  - gets some shortness of breath  with therapy but not at rest    - had low PI 10/9 ad cardiology  was notified, monitor closely and call cardiology  if remain < 2  . Has had PI 1.6 7:50 AM with thapy and post recheck was 4.4 then again had 1.7  9;42  then 2.8 at 10    - his PI fluctuate, has small LV per cardiology    - goal MAP 65-85      -  10/9  EKG  sinus  rhythm   - 10/9 500 ml fluid bolus   - speed decreased to 5100  Fluid status: Bumex to 0.5 mg daily. ( Has been on hold )   ACEi/ARB/ARNi: 50 mg losartan BID  Vasodilator: Hydralazine 50 mg TID increased to 75 mg tid 10/7  and held form 10/9  and restarted 12.5 mg TID 10/11 --- 25 mg tid 10/13   then 25 mg q 8 hrs 10/14   Received extra dose of hydralazine 25 mg 10/7    BB: Recent LVAD, not on any   Aldosterone antagonist deferred while other medical therapy is prioritized  SGLT2i:  Empagliflozin 10 mg   SCD prophylaxis: ICD  MAP: goal 65-85,   LDH trends: 230, stable  Anticoagulation: warfarin dosing per pharmacy, INR goal 2-3,   Antiplatelet: ASA not indicated in LVAD population, > 6 months s/p STEMI     -Patient  has not been able to participate in physical therapy  due to feeling dizzy when gets up , I was by bedside, he does endorse some dizziness while  sitting up, MAP 66 while  sitting up, there is concerns weather patient  needs to return to the hospital as he cannot participate in therapies    -Weights 10/5 66.8---64.1 ( bedside scale)  --63.3 ( standing scale) --64.8 ---61.96 10/10 on standing scale --- 63 kg ( 10/12)  in bed scale ---60.2 1-/13 bed scale ---59.8 kg 10/14       # Low  flow alarms, low PI , PI fluctuating   # HTN  Multiple low flow alarms 10/1, none reported 10/2. Verified that doppler measure is MAP vs. SBP.   - Speed optimization echo performed 10/1, speed decreased to 5100  - Losartan 50 mg BID  - Hydralazine adjusted as above   - continue to  monitor closely     - echo 10/10:  Left ventricular function is decreased. The ejection fraction is 15-20%  (severely reduced).  LVAD inflow and outflow cannula Dopplers are normal.  Septum bows towards the RV.  Global right ventricular function is moderately reduced.  Aortic valve opens with each cardiac cycle.  The inferior vena cava is normal.        #hyponatremia  - Na  hovering ~ 130      # History of VT/VF arrest 2023  # AFib s/p DCCV 9/5/2024 and again 9/30/2024  - Successfully cardioverted in early September for AF. Since then EKG suggested AF on 9/21. Tele is only available from as early as 9/22. The patient was started on hep gtt 9/21, but unclear if was in AF before this. While the patient was on hep gtt until INR was therapeutic, it is not possible to assess rhythm prior to 9/21. Systemic AC was off on 9/18-9/20. S/p MELBA and DCCV on 9/30 with conversion to sinus rhythm   - continue amiodarone 200 mg daily      # Visual changes, resolved.   -Stroke code call 9/29 for visual changes - right eye with decreased upper half of vision and bluish haze. Resolved after 30 minutes. Seen by opthalmology and neuro. Negative head CT and CTA head and neck. He has had 3 episodes which last about 5 minutes before resolving completely.   - Ophthalmology and Neuro, signed off  - Already on A/C  - Continue to monitor for changes     # Hypervolemic hyponatremia  Na 132, ranging 128-133  - encourage PO intake  - Bumex 0.5 mg daily now on hold , last received 10/5  , discussed with   cardiology and restart if start gaining weight or signs of  volume increase     - did get 500 ml fluid bolus 10/9      # Anemia  - Hg  stable in 8 range   - Iron  "studies suggestive of RADHA. Received IV venofer x5 days last day 10/2     # Bilateral pleural effusions  - Chest tube removed 9/30.   - Left thoracentesis done 10/1, 1 L removed, R thoracentesis done 10/3, 1 L removed.       #History localized prostate cancer, Sarah 4+7=7   - was not felt to be surgical candidate die to his heart disease   - received  Lupron q 3 months seems  received last dose this  year , radiation therapy     10/ 8-9-10-11 , 13, 14 discussed with  cardiology   also discussed with  ARU team daily        Diet: Room Service  Combination Diet Regular Diet; Easy to Chew (level 7); Thin Liquids (level 0)  Snacks/Supplements Adult: Ensure Enlive; With Meals  Snacks/Supplements Adult: Magic Cup; With Meals  Snacks/Supplements Adult: Other; Cottage cheese + strawberry Greek yogurt TID with meals; With Meals    DVT Prophylaxis: Warfarin  Michael Catheter: Not present  Lines: None     Cardiac Monitoring: None  Code Status: Full Code      Clinically Significant Risk Factors         # Hyponatremia: Lowest Na = 131 mmol/L in last 2 days, will monitor as appropriate  # Hypochloremia: Lowest Cl = 97 mmol/L in last 2 days, will monitor as appropriate      # Hypoalbuminemia: Lowest albumin = 2.6 g/dL at 10/9/2024  7:28 PM, will monitor as appropriate        # End stage heart failure: Ventricular assist device (VAD) present           # Severe Malnutrition: based on nutrition assessment    # Financial/Environmental Concerns:     # ICD device present          Jenniffer Rowland MD  Hospitalist Service  [facility]  Securely message with [employer] (more info)  Text page via [employer] Paging/Directory   ______________________________________________________________________    Interval History   Had near syncopal episode while working with therapy. He feels good now back in bed. Denies  no SB no nausea vomiting. He states \" I'm urinating like  a horse \"     Physical Exam   Vital " Signs: Temp: 98.2  F (36.8  C) Temp src: Oral   Pulse: 82   Resp: 17 SpO2: 96 % O2 Device: None (Room air)    Weight: 131 lbs 13.36 oz  General appearence: awake alert  in  no apparent distress      RESPIRATORY: lungs clear  , LVAD sounds on left    CARDIOVASCULAR:  LVAD sounds   GASTROINTESTINAL:soft, non-distended , non-tender , + bowel sounds, also LVAD sounds     SKIN: warm and dry, no mottling noted   NEUROLOGIC; awake alert and oriented, no focal deficits noted   EXTREMITIES: no clubbing, cyanosis or edema ,      Data     I have personally reviewed the following data over the past 24 hrs:    6.1  \   10.0 (L)   / 189     131 (L) 97 (L) 19.1 /  101 (H)   4.3 25 0.53 (L) \     INR:  2.38 (H) PTT:  N/A   D-dimer:  N/A Fibrinogen:  N/A     Ferritin:  N/A % Retic:  N/A LDH:  223       Imaging results reviewed over the past 24 hrs:   No results found for this or any previous visit (from the past 24 hour(s)).

## 2024-10-15 ENCOUNTER — APPOINTMENT (OUTPATIENT)
Dept: OCCUPATIONAL THERAPY | Facility: CLINIC | Age: 74
DRG: 949 | End: 2024-10-15
Attending: PHYSICAL MEDICINE & REHABILITATION
Payer: MEDICARE

## 2024-10-15 ENCOUNTER — APPOINTMENT (OUTPATIENT)
Dept: PHYSICAL THERAPY | Facility: CLINIC | Age: 74
DRG: 949 | End: 2024-10-15
Attending: PHYSICAL MEDICINE & REHABILITATION
Payer: MEDICARE

## 2024-10-15 ENCOUNTER — APPOINTMENT (OUTPATIENT)
Dept: SPEECH THERAPY | Facility: CLINIC | Age: 74
DRG: 949 | End: 2024-10-15
Attending: PHYSICAL MEDICINE & REHABILITATION
Payer: MEDICARE

## 2024-10-15 DIAGNOSIS — H53.40 VISUAL FIELD DEFECT: Primary | ICD-10-CM

## 2024-10-15 LAB — INR PPP: 2.41 (ref 0.85–1.15)

## 2024-10-15 PROCEDURE — 250N000013 HC RX MED GY IP 250 OP 250 PS 637

## 2024-10-15 PROCEDURE — 250N000013 HC RX MED GY IP 250 OP 250 PS 637: Performed by: INTERNAL MEDICINE

## 2024-10-15 PROCEDURE — 85610 PROTHROMBIN TIME: CPT

## 2024-10-15 PROCEDURE — 97530 THERAPEUTIC ACTIVITIES: CPT | Mod: GP | Performed by: REHABILITATION PRACTITIONER

## 2024-10-15 PROCEDURE — 97129 THER IVNTJ 1ST 15 MIN: CPT | Mod: GN | Performed by: SPEECH-LANGUAGE PATHOLOGIST

## 2024-10-15 PROCEDURE — 99233 SBSQ HOSP IP/OBS HIGH 50: CPT | Performed by: STUDENT IN AN ORGANIZED HEALTH CARE EDUCATION/TRAINING PROGRAM

## 2024-10-15 PROCEDURE — 97110 THERAPEUTIC EXERCISES: CPT | Mod: GO | Performed by: OCCUPATIONAL THERAPIST

## 2024-10-15 PROCEDURE — 99232 SBSQ HOSP IP/OBS MODERATE 35: CPT | Mod: GC

## 2024-10-15 PROCEDURE — 36415 COLL VENOUS BLD VENIPUNCTURE: CPT

## 2024-10-15 PROCEDURE — 258N000003 HC RX IP 258 OP 636: Performed by: NURSE PRACTITIONER

## 2024-10-15 PROCEDURE — 97130 THER IVNTJ EA ADDL 15 MIN: CPT | Mod: GN | Performed by: SPEECH-LANGUAGE PATHOLOGIST

## 2024-10-15 PROCEDURE — 128N000003 HC R&B REHAB

## 2024-10-15 PROCEDURE — 250N000013 HC RX MED GY IP 250 OP 250 PS 637: Performed by: NURSE PRACTITIONER

## 2024-10-15 PROCEDURE — 250N000013 HC RX MED GY IP 250 OP 250 PS 637: Performed by: PHYSICAL MEDICINE & REHABILITATION

## 2024-10-15 RX ORDER — WARFARIN SODIUM 2.5 MG/1
2.5 TABLET ORAL
Status: COMPLETED | OUTPATIENT
Start: 2024-10-15 | End: 2024-10-15

## 2024-10-15 RX ORDER — SODIUM CHLORIDE 9 MG/ML
INJECTION, SOLUTION INTRAVENOUS
Status: DISPENSED
Start: 2024-10-15 | End: 2024-10-16

## 2024-10-15 RX ADMIN — ATORVASTATIN CALCIUM 80 MG: 80 TABLET, FILM COATED ORAL at 21:08

## 2024-10-15 RX ADMIN — Medication 1 TABLET: at 21:08

## 2024-10-15 RX ADMIN — Medication 25 MG: at 13:19

## 2024-10-15 RX ADMIN — Medication 10 MG: at 21:08

## 2024-10-15 RX ADMIN — GABAPENTIN 100 MG: 100 CAPSULE ORAL at 21:09

## 2024-10-15 RX ADMIN — WARFARIN SODIUM 2.5 MG: 2.5 TABLET ORAL at 17:56

## 2024-10-15 RX ADMIN — AMIODARONE HYDROCHLORIDE 200 MG: 200 TABLET ORAL at 07:40

## 2024-10-15 RX ADMIN — SENNOSIDES AND DOCUSATE SODIUM 2 TABLET: 50; 8.6 TABLET ORAL at 21:08

## 2024-10-15 RX ADMIN — SODIUM CHLORIDE 1000 ML: 9 INJECTION, SOLUTION INTRAVENOUS at 16:31

## 2024-10-15 RX ADMIN — METHOCARBAMOL 750 MG: 750 TABLET ORAL at 07:39

## 2024-10-15 RX ADMIN — LOSARTAN POTASSIUM 50 MG: 50 TABLET, FILM COATED ORAL at 07:40

## 2024-10-15 RX ADMIN — MAGNESIUM OXIDE TAB 400 MG (241.3 MG ELEMENTAL MG) 400 MG: 400 (241.3 MG) TAB at 07:40

## 2024-10-15 RX ADMIN — Medication 25 MG: at 05:22

## 2024-10-15 RX ADMIN — Medication 1 TABLET: at 07:38

## 2024-10-15 RX ADMIN — ESCITALOPRAM OXALATE 10 MG: 10 TABLET ORAL at 07:40

## 2024-10-15 RX ADMIN — METHOCARBAMOL 750 MG: 750 TABLET ORAL at 13:19

## 2024-10-15 RX ADMIN — Medication 25 MG: at 21:08

## 2024-10-15 RX ADMIN — PANTOPRAZOLE SODIUM 40 MG: 40 TABLET, DELAYED RELEASE ORAL at 05:22

## 2024-10-15 RX ADMIN — METHOCARBAMOL 750 MG: 750 TABLET ORAL at 21:08

## 2024-10-15 RX ADMIN — LOSARTAN POTASSIUM 50 MG: 50 TABLET, FILM COATED ORAL at 21:08

## 2024-10-15 RX ADMIN — EMPAGLIFLOZIN 10 MG: 10 TABLET, FILM COATED ORAL at 07:40

## 2024-10-15 ASSESSMENT — ACTIVITIES OF DAILY LIVING (ADL)
ADLS_ACUITY_SCORE: 40

## 2024-10-15 NOTE — PLAN OF CARE
Goal Outcome Evaluation:      Plan of Care Reviewed With: patient  Overall Patient Progress: no change  Overall Patient Progress: no change        Orientation: AOX4  Bowel: Continent  LBM: 10-  Bladder: Continent; Uses Urinal by themself  Pain: Pt. denies pain  Ambulation/Transfers: Activity up w/ assist  Diet/Liquids: Combination Diet Regular Diet; Easy to Chew; Thin Liquids  Skin: WDL x Dry       Conitnue POC; Assess for LVAD alarms for any fluctuations in values

## 2024-10-15 NOTE — PROGRESS NOTES
[facility]    Medicine Progress Note - Hospitalist Service    Date of Admission:  10/5/2024    Assessment & Plan   Duane C Johnson is a 74 year old male pmhx of anterior STEMI s/p PCI to the pLAD on 10/26/23 with a VT/VF arrest the next day (10/27) Placed on amiodarone and discharged 11/03/23, ICD was not indicated as this was within 48h of his MI. His EF prior to discharge was 20-30% with a large area of akinesis in the LAD, placed on apixaban due to this (Apixaban dcd on 7/5/24). Had multiple admissions for HF exacerbation, admitted on 8/30/24 for CHF exacerbation with BNP 16k. CPX and RHC showed significant functional limitations due to heart failure. Underwent HM3 LVAD placement on 9/18/24 and required mediastinal re-exploration for post operative hemorrhage.   Transferred to ARU on 10/5 for rehab.     #Low flow and PI events  #HTN  #Weakness  #Dizziness  Noted to have ongoing dizziness with variable MAPs and elevated PI with low flow. Cardiology notified on 10/13 and 10/14, recommended scheduled q8h dosing of hydralazine and close monitoring. Regarding pt's dizziness does not appear to be associated with variability in pt MAPs, noted stability with MAPs in normal range in s/o sxs previously.  - continue close monitoring  - continue hydralazine 25 mg q8h    #Headache, resolved  #Numbness/tingling bilateral hands, resolved  Noted on 10/9 post-therapy that resolved after returning to bed. Prior provider d/w cardiology and was given 500 cc bolus IVFs on 10/9. Head CT was negative for acute intracranial process, EKG in sinus rhythm. Now resolved.    #Acute on chronic systolic heart failure secondary to ICM   #HFrEF s/p HM3 LVAD as DT on 9/18/2024  #CAD s/p PCI  #History of STEMI, CAD status post  PCI to LAD in 2023   #s/p ICD 5/8/2024 with history VT/VF arrest 2023   Stage D. NYHA Class III. SOB ongoing with therapy but not at rest. Variability in PI as noted in prior  notes and above. Prior echo on 10/10, significantly decreased LVEF 15-20%, normal LVAD inflow and outflow cannula dopplers, bowing septum towards RV, moderately reduced global RV function. Has had limited therapy here d/t symptoms of dizziness when getting OOB. However, no current dizziness as of 10/15. D/w PM&R and cardiology URIAH on 10/15, now s/p 1 L NS. Consideration for VAD CT if low flow stat persists.   - goal MAP 65-85     - continue holding bumex, consider restarting if s/sxs of volume overload  - continue losartan 50 mg bid  - continue hydralazine 25 mg q8h   - no BB  - Continue jardiance 10 mg daily  - continue SCDs  - warfarin per pharm, goal INR 2-3  - no ASA indicated in LVAD     #Hyponatremia, mild  Na stable low 130s. Asymptomatic currently.  - monitor intermittently    #History of VT/VF arrest 2023  #AFib s/p DCCV 9/5/2024 and again 9/30/2024  Successfully cardioverted in early September for AF. Since then EKG suggested AF on 9/21. Tele is only available from as early as 9/22. The patient was started on hep gtt 9/21, but unclear if was in AF before this. While the patient was on hep gtt until INR was therapeutic, it is not possible to assess rhythm prior to 9/21. Systemic AC was off on 9/18-9/20. S/p MELBA and DCCV on 9/30 with conversion to sinus rhythm   - continue amiodarone 200 mg daily      #Visual changes, resolved  Stroke code call 9/29 for visual changes - right eye with decreased upper half of vision and bluish haze. Resolved after 30 minutes. Seen by opthalmology and neuro. Negative head CT and CTA head and neck. He has had 3 episodes which last about 5 minutes before resolving completely.   - Ophthalmology and Neuro now signed off     # Anemia  - Hgb stable in 8 range   - Iron studies suggestive of RADHA. Received IV venofer x5 days last day 10/2     #Bilateral pleural effusions  Chest tube removed 9/30. S/p Left thoracentesis 10/1 with total 1 L removed, R thoracentesis completed 10/3 with  additional 1 L removed.     #History localized prostate cancer, Sarah 4+7=7   Was not felt to be surgical candidate d/t cardiac disease. Received  Lupron q 3 months and completed radiation therapy.  - f/u as outpatient when able        Diet: Room Service  Combination Diet Regular Diet; Easy to Chew (level 7); Thin Liquids (level 0)  Snacks/Supplements Adult: Ensure Enlive; With Meals  Snacks/Supplements Adult: Magic Cup; With Meals  Snacks/Supplements Adult: Other; Cottage cheese + strawberry Greek yogurt TID with meals; With Meals    DVT Prophylaxis: Warfarin  Michael Catheter: Not present  Lines: None     Cardiac Monitoring: None  Code Status: Full Code      Clinically Significant Risk Factors         # Hyponatremia: Lowest Na = 131 mmol/L in last 2 days, will monitor as appropriate  # Hypochloremia: Lowest Cl = 97 mmol/L in last 2 days, will monitor as appropriate      # Hypoalbuminemia: Lowest albumin = 2.6 g/dL at 10/9/2024  7:28 PM, will monitor as appropriate      # End stage heart failure: Ventricular assist device (VAD) present           # Severe Malnutrition: based on nutrition assessment    # Financial/Environmental Concerns:     # ICD device present       Disposition Plan     Medically Ready for Discharge: Anticipated in 5+ Days       Abiola Schilling MD  Hospitalist Service  [facility]  Securely message with [employer] (more info)  Text page via [email or web address] Paging/Directory   ______________________________________________________________________    Interval History   Elevated PI overnight.  Doing well this AM, but has had limited activity in bed.    Physical Exam   Vital Signs:             SpO2: 97 % O2 Device: None (Room air)    Weight: 131 lbs 13.36 oz    General: NAD, laying in bed  HEENT: EOMI, PERRLA, MMM   Resp: CTAB, no w/r/r, no increased WOB  CV: LVAD device sounds on auscultation no BLE edema  GI: soft, non-tender, non-distended  Skin: no rash, no  lesions  Neuro: AOx3, no focal neuro deficits   Psych: mood appropriate, normal affect, judgement intact    Medical Decision Making       50 MINUTES SPENT BY ME on the date of service doing chart review, history, exam, documentation & further activities per the note.      Data   ------------------------- PAST 24 HR DATA REVIEWED -----------------------------------------------    I have personally reviewed the following data over the past 24 hrs:    INR:  2.41 (H) PTT:  N/A   D-dimer:  N/A Fibrinogen:  N/A       Imaging results reviewed over the past 24 hrs:   No results found for this or any previous visit (from the past 24 hour(s)).

## 2024-10-15 NOTE — PROGRESS NOTES
D: Stopped by to see patient. Final day of education.  I: Discussed POC and provided support and listened to patient and caregiver's thoughts and concerns.  P: Continue to follow patient and address any questions or concerns patient and or caregiver may have.      Indication of Interrogation:  VAD alarms    Type of VAD:  Heartmate 3    Current Parameters:  Flow= 3.4 lpm, Speed= 5100 rpm, Power= 3.4 jackson, PI (if applicable)= 4.8    Abnormal Alarm on History:  Yes, explain: 19 low flow alarms between 4:57AM & 7:08AM    Abnormal Events/Parameters Notes:  Yes, explain: Low flow alarms, PIs ranging from 2-10    Changes Made during Interrogation:  No    Encouraged patient to intake 64oz/day.

## 2024-10-15 NOTE — PLAN OF CARE
"Goal Outcome Evaluation:      Plan of Care Reviewed With: patient    Overall Patient Progress: improvingOverall Patient Progress: improving    VS: /87 (BP Location: Right arm)   Pulse 82   Temp 98.2  F (36.8  C) (Oral)   Resp 17   Ht 1.676 m (5' 6\")   Wt 59.8 kg (131 lb 13.4 oz)   SpO2 97%   BMI 21.28 kg/m       O2: 97% ON RA    Output: Voids spontaneously without difficulty to bathroom / using beside urinal at bed    Last BM: 10/14    Activity: Up with A x 1 with a walker    Skin: WDL except, drive line incision    Pain: Denies pain    CMS: Intact, Alert and oriented. Denies numbness and tingling.   Dressing: CDI   Diet: Regular diet, thin liquids takes pills whole.    LDA: Drive line, LVAD. L - PIV   Equipment: LVAD machine     Plan: Precautions maintained. Continue with plan of care. Call light within reach, bed alarm on. Pt able to use a call light appropriately and make his needs known.    Additional Info: PI was up to 10.2, Provider notified, but has not yet called back. Pt will be transferring to [facility] today but not sure about the time.   Report was given to incoming RN to continue to monitor and follow up with the provider.       "

## 2024-10-15 NOTE — PLAN OF CARE
"Goal Outcome Evaluation:      Plan of Care Reviewed With: patient    Overall Patient Progress: no change  VS: /87 (BP Location: Right arm)   Pulse 82   Temp 98.2  F (36.8  C) (Oral)   Resp 17   Ht 1.676 m (5' 6\")   Wt 59.8 kg (131 lb 13.4 oz)   SpO2 97%   BMI 21.28 kg/m       O2: SpO2 97% and stable on RA. Denies chest pain and SOB.    Output: Voids spontaneously without difficulty to bathroom / using beside urinal .   Last BM: 10/12, denies abdominal discomfort. BS active / passing flatus.    Activity: Up with A x 1 walker and GB.   Skin: WDL except, sternal incision, chest tube sites (MELISSA) and Drive line insertion site.   Pain: Denies pain.   Orientation: Alert and Oriented x 4 . Denies numbness and tingling.   Dressing: Lvad drive line insertion site dressing is C/D/I.   Diet: Easy to chew and thin liquids.     LDA: Left  PIV SL .   Equipment: IV pole, personal belongings,    Plan:  Continue with plan of care. Call light within reach, pt able to make needs known.    Additional Info: LVAD numbers within parameters.       "

## 2024-10-15 NOTE — PLAN OF CARE
"Discharge Planner Post-Acute Rehab SLP:     Discharge Plan: home with wife. No ongoing SLP recommended.    Precautions: LVAD    Current Status:  Hearing: Increased volume  Vision: Readers  Communication: Motor speech, language intact  Cognition: Mild cognitive impairment- severe short term memory deficits  Swallow: Easy to chew solids (7)/Thin liquids (0). Pt reported due to lack of dentition, only wants to eat chicken noodle soup and mashed potatoes every meal. Clinical swallow evaluation not indicated at this time, MD can manage diet texture as it is related to dentition status only.     Assessment: Instructed pt in working memory, planning, money calculation task with grocery budget. Pt approached task as though it was full of \"tricks,\" required max edu there is no trick, only needs to pick one item from column A or B for each of the 13 grocery items on list. Once pt initiated task, pt under budget with first attempt.     Other Barriers to Discharge (Family Training, etc): level of assist/supervision with meds, financial, and LVAD mgmt?    "

## 2024-10-15 NOTE — PLAN OF CARE
Goal Outcome Evaluation:      Plan of Care Reviewed With: patient    Overall Patient Progress: no change    Outcome Evaluation: Pt is aware of medication, no change to skin driveline dressing changed by LVAD team and wife, continues to be safe using call light and waiting for assistance.    FOCUS/GOAL  Nutrition/Feeding/Swallowing precautions    ASSESSMENT, INTERVENTIONS AND CONTINUING PLAN FOR GOAL:  Pt Aox4 using call light to make needs known.  A1 w/w and GB.  LVAD HMIII, PI and flow numbers continue to be up and down throughout the shift, PMR team aware.  Bolus ordered and started on writers shift.  Denies pain, cough, sob, chest pain.  Pt has been dizzy with therapies.  Cont of B&B, LBM 10/12.  Easy to chew/thin, Encouraging fluids, taking pills whole with water.  Working with therapies. Nursing will continue with POC.

## 2024-10-15 NOTE — PROGRESS NOTES
"  [facility]   Acute Rehabilitation Unit  Daily Progress Note    INTERVAL HISTORY  Notes and labs reviewed over past 24 hours. On call provider contacted overnight regarding LVAD alarms going off. Flow has ranged from 2.7-3.2, PI ranged from 6.5-10.1 overnight. This AM, he reports that he does not feel lightheaded when supine in bed. But when attempting to sit up and get out of bed, he does begin to experience lightheadedness/dizziness. He has been unable to participate with therapies today. He feels that he has drank roughly 30-40 ounces of fluid over the past 24 hours.     He currently feels fatigued but he denies current pain, chest pain, vision changes, nausea/vomiting.     ROS: 10 point ROS was assessed and was negative unless otherwise stated in HPI      Functionally,    With PT: 10/14  Current Status:  Bed Mobility: Maria Esther rolling, Sup<>sit SBA w/ bedrail   Transfer: SBA w/ 4WW   Gait: Up to 200' SBA w/ 4WW   Stairs: 6\" x4 w/ joel railings, CGA  Balance: Stable dynamic sitting, SBA for unsupported dynamic standing   Outcome Measures:   5TSTS (modified, light UE push off): 32 seconds on 10/9  6MWT: max amb distance 150' on 10/9.     With OT: 10/13  Current Status:  ADLs:  Mobility: S/SBA WWW if on wall power, nearing mod IND if on battery power  Grooming: IND standing at sink  Dressing: UB - IND LB - IND with 4WW  Bathing: Mod I with FB spongebath seated at EOB  Toileting: Supervision with 4WW  IADLs: TBD. Supportive spouse who can assist  Vision/Cognition: Pt scored 17/30 on SLUMs in 10/3/24. ACE-III on 10/8/24: 74/100 indicating cognitive deficits     SLP: 10/14  Current Status:  Hearing: Increased volume  Vision: Readers  Communication: Motor speech, language intact  Cognition: Mild cognitive impairment- severe short term memory deficits  Swallow: Easy to chew solids (7)/Thin liquids (0). Pt reported due to lack of dentition, only wants to eat chicken noodle soup and mashed " potatoes. Clinical swallow evaluation not indicated at this time, MD can manage diet texture as it is related to dentition status only.     MEDICATIONS  Scheduled meds  Current Facility-Administered Medications   Medication Dose Route Frequency Provider Last Rate Last Admin    amiodarone (PACERONE) tablet 200 mg  200 mg Oral Daily Toussaint Gonzalez, Paola, MD   200 mg at 10/15/24 0740    atorvastatin (LIPITOR) tablet 80 mg  80 mg Oral QPM Toussaint Gonzalez, Paola, MD   80 mg at 10/14/24 2021    empagliflozin (JARDIANCE) tablet 10 mg  10 mg Oral Daily Sirisha Arias APRN CNP   10 mg at 10/15/24 0740    escitalopram (LEXAPRO) tablet 10 mg  10 mg Oral or Feeding Tube Daily Toussaint Gonzalez, Paola, MD   10 mg at 10/15/24 0740    gabapentin (NEURONTIN) capsule 100 mg  100 mg Oral or Feeding Tube At Bedtime Toussaint Gonzalez, Paola, MD   100 mg at 10/14/24 2021    hydrALAZINE (APRESOLINE) half-tab 25 mg  25 mg Oral Q8H Jenniffer Rowland MD   25 mg at 10/15/24 0522    losartan (COZAAR) tablet 50 mg  50 mg Oral BID Toussaint Gonzalez, Paola, MD   50 mg at 10/15/24 0740    magnesium oxide (MAG-OX) tablet 400 mg  400 mg Oral Daily Toussaint Gonzalez, Paola, MD   400 mg at 10/15/24 0740    melatonin tablet 10 mg  10 mg Oral At Bedtime Toussaint Gonzalez, Paola, MD   10 mg at 10/14/24 2021    methocarbamol (ROBAXIN) tablet 750 mg  750 mg Oral TID Toussaint Gonzalez, Paola, MD   750 mg at 10/15/24 0739    multivitamin w/minerals (THERA-VIT-M) tablet 1 tablet  1 tablet Oral BID Toussaint Gonzalez, Paola, MD   1 tablet at 10/15/24 0738    pantoprazole (PROTONIX) EC tablet 40 mg  40 mg Oral QAM AC Toussaint Gonzalez, Paola, MD   40 mg at 10/15/24 0522    Warfarin Dose Required Daily - Pharmacist Managed  1 each Oral See Admin Instructions Toussaint Gonzalez, Paola, MD           PRN meds:  Current Facility-Administered Medications   Medication Dose Route Frequency Provider Last Rate Last Admin    acetaminophen (TYLENOL) tablet  "975 mg  975 mg Oral Q8H PRN Roberto Carlos Braswell MD        bisacodyl (DULCOLAX) suppository 10 mg  10 mg Rectal Daily PRN Toussaint Gonzalez, Paola, MD        lidocaine (LMX4) cream   Topical Q1H PRN Toussaint Gonzalez, Paola, MD        lidocaine 1 % 0.1-1 mL  0.1-1 mL Other Q1H PRN Toussaint Gonzalez, Paola, MD        naloxone (NARCAN) injection 0.2 mg  0.2 mg Intravenous Q2 Min PRN Toussaint Gonzalez, Paola, MD        Or    naloxone (NARCAN) injection 0.4 mg  0.4 mg Intravenous Q2 Min PRN Toussaint Gonzalez, Paola, MD        Or    naloxone (NARCAN) injection 0.2 mg  0.2 mg Intramuscular Q2 Min PRN Toussaint Gonzalez, Paola, MD        Or    naloxone (NARCAN) injection 0.4 mg  0.4 mg Intramuscular Q2 Min PRN Toussaint Gonzalez, Paola, MD        ondansetron (ZOFRAN ODT) ODT tab 4 mg  4 mg Oral Q6H PRN Toussaint Gonzalez, Paola, MD        oxyCODONE (ROXICODONE) tablet 5 mg  5 mg Oral Q4H PRN Toussaint Gonzalez, Paola, MD        Patient is already receiving anticoagulation with heparin, enoxaparin (LOVENOX), warfarin (COUMADIN)  or other anticoagulant medication   Does not apply Continuous PRN Toussaint Gonzalez, Paola, MD        polyethylene glycol (MIRALAX) Packet 17 g  17 g Oral or Feeding Tube BID PRN Toussaint Gonzalez, Paola, MD   17 g at 10/07/24 2159    senna-docusate (SENOKOT-S/PERICOLACE) 8.6-50 MG per tablet 2 tablet  2 tablet Oral or Feeding Tube BID PRN Toussaint Gonzalez, Paola, MD   2 tablet at 10/10/24 0817    sodium chloride (PF) 0.9% PF flush 3 mL  3 mL Intracatheter q1 min prn Toussaint Gonzalez, Paola, MD             PHYSICAL EXAM  /87 (BP Location: Right arm)   Pulse 82   Temp 98.2  F (36.8  C) (Oral)   Resp 17   Ht 1.676 m (5' 6\")   Wt 59.8 kg (131 lb 13.4 oz)   SpO2 97%   BMI 21.28 kg/m    General: Intermittently lightheaded, conversational and alert, forgetful, resting in bed  HEENT: atraumatic, nares clear, conjunctiva white  Pulmonary: on room air, lungs clear to auscultation " bilaterally  Cardiovascular: LVAD hum  Abdominal: soft, non-tender to palpation, non-distended  Extremities: warm peripherally, no edema in BLEs  Skin: warm, dry, without erythema, ecchymosis, or rash noted  MSK: no BLE edema noted, calves non-tender to palpation  Neuro: Alert. 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 Due to worsening functional limitations due to heart failure, patient was worked up for VAD while inpatient. Patient is now s/p implantation of HeartMate 3 LVAD on 9/18/2024. His post-operative course was complicated by Afib, pain, anxiety, insomnia, and transient right eye visual deficit. He has also required medical mgmt of his acute blood loss anemia, fluid overload, hyperglycemia, hyponatremia and B pleural effusions. He is now medically stable, admitted to rehab on 10/05/2024 in setting of impaired strength, impaired balance and impaired activity tolerance.     Updates Today:  - Presyncope: Continued inability to tolerate activity during therapies as a result of presyncope/orthostasis. Will continue with hydralazine 25 mg q8h as directed by cardiology.   - Disposition: Due to Duane's inability to participate in therapy and therefore inability to work towards discharge home, we will begin seeking authorization for a transfer to the  TCU. If Duane is able to participate in PT/OT, he will likely be a good candidate to remain in acute rehab. Otherwise, we will plan to transfer to  TCU later this week.     Admission to acute inpatient rehab 10/05/2024.     Impairment group code: 09 Cardiac: s/p Heartmate III LVAD placement in setting of acute on chronic systolic heart failure d/t ischemic cardiomyopathy      PT, OT and SLP 60 minutes of each, 6 days a week for 12 days,  in addition to rehab nursing and close management of physiatrist.    Impairment of ADL's: OT for 6 days a week for 12 days, to work on ADL re-training such as grooming, self cares and bathing.   Impairment of mobility:  PT  for 6 days a week for 12 days, to work on neuromuscular re-education focusing on strength, balance, coordination, and endurance.    Impairment of cognition/language/swallow:  SLP for 6 days a week for 12 days, to work on cognitive and linguistic deficits.  Rehab RN for med administration, bowel regimen, glucose monitoring and wound care.       Medical Conditions  # Acute on chronic systolic heart failure secondary to ICM   #HFrEF  s/p HM3 LVAD as DT on 9/18/2024  # CAD s/p PCI  # History of STEMI, s/p PCI to LAD in 2023   # s/p ICD 5/8/2024, hxVT/VF arrest   Stage D. NYHA Class III. Limited TTE 9/27 with LVIDd 4.0 cm, Closed AoV, no AI, moderately reduced RV, normal IVC. TTE (10/10) demonstrates septum bowing into RV. LV measures 4.7 cm. Intermittently has been triggering LVAD alarms, likely dt hypovolemia.   -Continue 50 mg losartan BID  -Hydralazine 50 mg increased to 75 mg TID. Changed to 12.5 mg TID on 10/11. Changed to 25mg 18h on 10/14.   -Continue Empagliflozin 10 mg QD  -Continue Anticoagulation: warfarin dosing per pharmacy, INR goal 2-3.   Antiplatelet: ASA not indicated in LVAD population, > 6 months s/p STEMI     # Hyperlipidemia  -Continue atorvastatin 80mg      # History of VT/VF arrest 2023  # AFib s/p DCCV 9/2024 and again 9/30/2024  - Successfully cardioverted in early September for AF. Since then EKG suggested AF on 9/21. The patient was started on hep gtt 9/21, but unclear if was in AF before this. S/p MELBA and DCCV on 9/30 with conversion to sinus rhythm    -Continue amiodarone 200 mg daily   -Continue AC as above     # Visual changes, resolved.   # Generalized Weakness  Stroke code call 9/29 for visual changes - right eye with decreased upper half of vision and bluish haze. Resolved after 30 minutes. Seen by opthalmology and neuro. Negative head CT and CTA head and neck. Additionally had stroke code activated on 10/9 due to transient left upper extremity weakness. This resolved spontaneously and CT head returned negative.   - Already on A/C     # Iron Deficiency Anemia  - Hgb 8.4 10/4. No overt bleeds, slow downtrend since surgery. LDH downtrending.   - Iron studies suggestive of RADHA. Recieved IV venofer x5 days  - CBC qMon/Thurs     #Pain  - Acetaminophen PRN  - Continue gabapentin 100 mg bedtime     # Hypervolemic hyponatremia  Ranging 128-133  - Holding PTA Bumex 0.5 mg daily  - Continue empagliflozin 10 mg daily      # Bilateral pleural effusions, Resolved  -Chest tube removed 9/30.   - Left thoracentesis done 10/1, 1 L removed, R thoracentesis done 10/3, 1 L removed.   -Continue incentive spirometer     Adjustment to disability:  Clinical psychology to eval and treat. Currently on escitalopram.   FEN: 0-Thin and 6-Soft and bite sized   Bowel: continent  Bladder: continent  DVT Prophylaxis: on warfarin  GI Prophylaxis: pantoprazole.   Code: Full code  Disposition: house with wife  TAINA:  Unclear.  Rehab prognosis:  Anticipate w/ intensive PT/OT, skilled rehab nursing cares, and medical mgmt by PMR and HOSP providers, pt will achieve a level of mod IND for bed mobility, transfers, gait, stairs, ADLs, and be able to safely and independently manage his Heartmate III LVAD.   Follow up Appointments on Discharge:   PCP  Ophthalmology  PM&R   Outpatient therapies  Cardiology     Seen and discussed with Dr. Fountain, PM&R staff physician     --   Roberto Carlos Braswell MD  [facility] PM&R PGY-2  Pager: [phone]

## 2024-10-15 NOTE — PROGRESS NOTES
All VAD education is complete.    -Melissa, wife, demonstrated removing the old dressing, cleaning the exit site, and applying new dressing using sterile technique. Understands need for DL immobilization and signs/symptoms of infection.

## 2024-10-15 NOTE — PLAN OF CARE
"Discharge Plan: home w/ spouse assist     Precautions: fall, LVAD- monitor for low PI's      Current Status:  Bed Mobility: Maria Esther rolling, Sup<>sit SBA w/ bedrail   Transfer: SBA w/ 4WW   Gait: Up to 200' SBA w/ 4WW   Stairs: 6\" x4 w/ joel railings, SBA  Balance: Stable dynamic sitting, SBA for unsupported dynamic standing      Outcome Measures:   5TSTS (modified, light UE push off): 32 seconds on 10/9  6MWT: max amb distance 150' on 10/9.      Assessment: pt progressing well with switching to and from wall to batteries. P.I still fluctuation form 1.9 to 5.5 with increased dizziness. PT session cut short.    Pt able to tolerated pm session well, after slow transition to up right, pt demo short amb for 20 to 40' with 4WW no C/O of dizziness.     Other Barriers to Discharge (DME, Family Training, etc)   Caregiver connection with spouse: 10/14 PM- completed, however limited in OOB demonstrated in presence of low PI.     4WW: Ordered from [facility].   "

## 2024-10-15 NOTE — PROGRESS NOTES
Discharge Planner Post-Acute Rehab OT:      Discharge Plan: home with wife, HH OT.      Precautions: falls, sternal, cardiac, LVAD, short term memory deficits     Current Status:  ADLs:  Mobility: S with 4WW if on wall power, nearing mod IND if on battery power  Grooming: IND standing at sink  Dressing: UB - IND LB - IND with 4WW  Bathing: Mod I with FB spongebath seated at EOB  Toileting: Supervision with 4WW  IADLs: TBD. Supportive spouse who can assist  Vision/Cognition: Pt scored 17/30 on SLUMs in 10/3/24. ACE-III on 10/8/24: 74/100 indicating cognitive deficits      Assessment:   Pt continues to be symptomatic with dizziness when PI decreases (PI ranged from 1.5-3.5) with activity. Pt c/o of heavy arms and dizziness when ambulating and standing when PI is low. Pt will be safe to progress to mod IND with 4WW once medically stabilized.     -25 d/t dizziness and labile PI     Other Barriers to Discharge (DME, Family Training, etc):   Family training prior to discharge with spouse  AE/DME: Shower chair

## 2024-10-16 ENCOUNTER — APPOINTMENT (OUTPATIENT)
Dept: OCCUPATIONAL THERAPY | Facility: CLINIC | Age: 74
DRG: 949 | End: 2024-10-16
Attending: PHYSICAL MEDICINE & REHABILITATION
Payer: MEDICARE

## 2024-10-16 ENCOUNTER — APPOINTMENT (OUTPATIENT)
Dept: PHYSICAL THERAPY | Facility: CLINIC | Age: 74
DRG: 949 | End: 2024-10-16
Attending: PHYSICAL MEDICINE & REHABILITATION
Payer: MEDICARE

## 2024-10-16 ENCOUNTER — APPOINTMENT (OUTPATIENT)
Dept: SPEECH THERAPY | Facility: CLINIC | Age: 74
DRG: 949 | End: 2024-10-16
Attending: PHYSICAL MEDICINE & REHABILITATION
Payer: MEDICARE

## 2024-10-16 LAB — INR PPP: 2.51 (ref 0.85–1.15)

## 2024-10-16 PROCEDURE — 250N000013 HC RX MED GY IP 250 OP 250 PS 637: Performed by: NURSE PRACTITIONER

## 2024-10-16 PROCEDURE — 99232 SBSQ HOSP IP/OBS MODERATE 35: CPT | Performed by: STUDENT IN AN ORGANIZED HEALTH CARE EDUCATION/TRAINING PROGRAM

## 2024-10-16 PROCEDURE — 97110 THERAPEUTIC EXERCISES: CPT | Mod: GO

## 2024-10-16 PROCEDURE — 97129 THER IVNTJ 1ST 15 MIN: CPT | Mod: GN | Performed by: SPEECH-LANGUAGE PATHOLOGIST

## 2024-10-16 PROCEDURE — 97130 THER IVNTJ EA ADDL 15 MIN: CPT | Mod: GN | Performed by: SPEECH-LANGUAGE PATHOLOGIST

## 2024-10-16 PROCEDURE — 128N000003 HC R&B REHAB

## 2024-10-16 PROCEDURE — 258N000003 HC RX IP 258 OP 636

## 2024-10-16 PROCEDURE — 97535 SELF CARE MNGMENT TRAINING: CPT | Mod: GO

## 2024-10-16 PROCEDURE — 85610 PROTHROMBIN TIME: CPT

## 2024-10-16 PROCEDURE — 250N000013 HC RX MED GY IP 250 OP 250 PS 637: Performed by: PHYSICAL MEDICINE & REHABILITATION

## 2024-10-16 PROCEDURE — 36415 COLL VENOUS BLD VENIPUNCTURE: CPT

## 2024-10-16 PROCEDURE — 99232 SBSQ HOSP IP/OBS MODERATE 35: CPT | Performed by: PHYSICAL MEDICINE & REHABILITATION

## 2024-10-16 PROCEDURE — 97110 THERAPEUTIC EXERCISES: CPT | Mod: GP

## 2024-10-16 PROCEDURE — 250N000013 HC RX MED GY IP 250 OP 250 PS 637

## 2024-10-16 PROCEDURE — 250N000013 HC RX MED GY IP 250 OP 250 PS 637: Performed by: INTERNAL MEDICINE

## 2024-10-16 RX ORDER — AMIODARONE HYDROCHLORIDE 200 MG/1
200 TABLET ORAL DAILY
Status: CANCELLED | OUTPATIENT
Start: 2024-10-17

## 2024-10-16 RX ORDER — ACETAMINOPHEN 325 MG/1
975 TABLET ORAL EVERY 8 HOURS PRN
Status: CANCELLED | OUTPATIENT
Start: 2024-10-16

## 2024-10-16 RX ORDER — METHOCARBAMOL 750 MG/1
750 TABLET, FILM COATED ORAL 3 TIMES DAILY
Status: CANCELLED | OUTPATIENT
Start: 2024-10-16

## 2024-10-16 RX ORDER — POLYETHYLENE GLYCOL 3350 17 G/17G
17 POWDER, FOR SOLUTION ORAL 2 TIMES DAILY PRN
Status: CANCELLED | OUTPATIENT
Start: 2024-10-16

## 2024-10-16 RX ORDER — ESCITALOPRAM OXALATE 10 MG/1
10 TABLET ORAL DAILY
Status: CANCELLED | OUTPATIENT
Start: 2024-10-17

## 2024-10-16 RX ORDER — MULTIPLE VITAMINS W/ MINERALS TAB 9MG-400MCG
1 TAB ORAL DAILY
Status: CANCELLED | OUTPATIENT
Start: 2024-10-17

## 2024-10-16 RX ORDER — SODIUM CHLORIDE 9 MG/ML
INJECTION, SOLUTION INTRAVENOUS
Status: DISPENSED
Start: 2024-10-16 | End: 2024-10-17

## 2024-10-16 RX ORDER — NALOXONE HYDROCHLORIDE 0.4 MG/ML
0.2 INJECTION, SOLUTION INTRAMUSCULAR; INTRAVENOUS; SUBCUTANEOUS
Status: CANCELLED | OUTPATIENT
Start: 2024-10-16

## 2024-10-16 RX ORDER — GABAPENTIN 100 MG/1
100 CAPSULE ORAL AT BEDTIME
Status: CANCELLED | OUTPATIENT
Start: 2024-10-16

## 2024-10-16 RX ORDER — AMOXICILLIN 250 MG
2 CAPSULE ORAL 2 TIMES DAILY PRN
Status: CANCELLED | OUTPATIENT
Start: 2024-10-16

## 2024-10-16 RX ORDER — OXYCODONE HYDROCHLORIDE 5 MG/1
5 TABLET ORAL EVERY 4 HOURS PRN
Status: CANCELLED | OUTPATIENT
Start: 2024-10-16

## 2024-10-16 RX ORDER — BISACODYL 10 MG
10 SUPPOSITORY, RECTAL RECTAL DAILY PRN
Status: CANCELLED | OUTPATIENT
Start: 2024-10-16

## 2024-10-16 RX ORDER — PANTOPRAZOLE SODIUM 40 MG/1
40 TABLET, DELAYED RELEASE ORAL
Status: CANCELLED | OUTPATIENT
Start: 2024-10-17

## 2024-10-16 RX ORDER — MAGNESIUM OXIDE 400 MG/1
400 TABLET ORAL DAILY
Status: CANCELLED | OUTPATIENT
Start: 2024-10-17

## 2024-10-16 RX ORDER — ATORVASTATIN CALCIUM 80 MG/1
80 TABLET, FILM COATED ORAL EVERY EVENING
Status: CANCELLED | OUTPATIENT
Start: 2024-10-16

## 2024-10-16 RX ORDER — LIDOCAINE 40 MG/G
CREAM TOPICAL
Status: CANCELLED | OUTPATIENT
Start: 2024-10-16

## 2024-10-16 RX ORDER — ONDANSETRON 4 MG/1
4 TABLET, ORALLY DISINTEGRATING ORAL EVERY 6 HOURS PRN
Status: CANCELLED | OUTPATIENT
Start: 2024-10-16

## 2024-10-16 RX ORDER — MULTIPLE VITAMINS W/ MINERALS TAB 9MG-400MCG
1 TAB ORAL DAILY
Status: DISCONTINUED | OUTPATIENT
Start: 2024-10-17 | End: 2024-10-17 | Stop reason: HOSPADM

## 2024-10-16 RX ORDER — WARFARIN SODIUM 2.5 MG/1
2.5 TABLET ORAL
Status: COMPLETED | OUTPATIENT
Start: 2024-10-16 | End: 2024-10-16

## 2024-10-16 RX ORDER — NALOXONE HYDROCHLORIDE 0.4 MG/ML
0.4 INJECTION, SOLUTION INTRAMUSCULAR; INTRAVENOUS; SUBCUTANEOUS
Status: CANCELLED | OUTPATIENT
Start: 2024-10-16

## 2024-10-16 RX ORDER — LOSARTAN POTASSIUM 50 MG/1
50 TABLET ORAL 2 TIMES DAILY
Status: CANCELLED | OUTPATIENT
Start: 2024-10-16

## 2024-10-16 RX ADMIN — GABAPENTIN 100 MG: 100 CAPSULE ORAL at 20:00

## 2024-10-16 RX ADMIN — Medication 25 MG: at 13:53

## 2024-10-16 RX ADMIN — MAGNESIUM OXIDE TAB 400 MG (241.3 MG ELEMENTAL MG) 400 MG: 400 (241.3 MG) TAB at 08:51

## 2024-10-16 RX ADMIN — LOSARTAN POTASSIUM 50 MG: 50 TABLET, FILM COATED ORAL at 08:51

## 2024-10-16 RX ADMIN — METHOCARBAMOL 750 MG: 750 TABLET ORAL at 13:53

## 2024-10-16 RX ADMIN — ESCITALOPRAM OXALATE 10 MG: 10 TABLET ORAL at 08:51

## 2024-10-16 RX ADMIN — METHOCARBAMOL 750 MG: 750 TABLET ORAL at 20:00

## 2024-10-16 RX ADMIN — ATORVASTATIN CALCIUM 80 MG: 80 TABLET, FILM COATED ORAL at 20:00

## 2024-10-16 RX ADMIN — PANTOPRAZOLE SODIUM 40 MG: 40 TABLET, DELAYED RELEASE ORAL at 05:40

## 2024-10-16 RX ADMIN — Medication 25 MG: at 05:40

## 2024-10-16 RX ADMIN — WARFARIN SODIUM 2.5 MG: 2.5 TABLET ORAL at 17:03

## 2024-10-16 RX ADMIN — AMIODARONE HYDROCHLORIDE 200 MG: 200 TABLET ORAL at 08:51

## 2024-10-16 RX ADMIN — SODIUM CHLORIDE 1000 ML: 9 INJECTION, SOLUTION INTRAVENOUS at 17:03

## 2024-10-16 RX ADMIN — METHOCARBAMOL 750 MG: 750 TABLET ORAL at 08:51

## 2024-10-16 RX ADMIN — EMPAGLIFLOZIN 10 MG: 10 TABLET, FILM COATED ORAL at 08:51

## 2024-10-16 RX ADMIN — LOSARTAN POTASSIUM 50 MG: 50 TABLET, FILM COATED ORAL at 20:00

## 2024-10-16 RX ADMIN — Medication 10 MG: at 20:00

## 2024-10-16 RX ADMIN — Medication 1 TABLET: at 08:51

## 2024-10-16 ASSESSMENT — ACTIVITIES OF DAILY LIVING (ADL)
ADLS_ACUITY_SCORE: 40
ADLS_ACUITY_SCORE: 33
ADLS_ACUITY_SCORE: 40
ADLS_ACUITY_SCORE: 40
ADLS_ACUITY_SCORE: 33
ADLS_ACUITY_SCORE: 40
ADLS_ACUITY_SCORE: 33
ADLS_ACUITY_SCORE: 33
ADLS_ACUITY_SCORE: 40
ADLS_ACUITY_SCORE: 33
ADLS_ACUITY_SCORE: 40
ADLS_ACUITY_SCORE: 33
ADLS_ACUITY_SCORE: 33
ADLS_ACUITY_SCORE: 40
ADLS_ACUITY_SCORE: 33
ADLS_ACUITY_SCORE: 40
ADLS_ACUITY_SCORE: 33

## 2024-10-16 NOTE — PROGRESS NOTES
Writer had a voicemail from patient's spouse, Melissa, inquiring about the handicap parking form. Writer did leave sticky note for provider a week ago, which still remains. Writer contacted PM&R who stated it would be completed tomorrow. Writer called Melissa back and did let her know that it would be available tomorrow. She also expressed concern about patient receiving IMM prior to his original discharge date. Writer attempted to explain what the IMM means. Spouse should be present or called when next IMM is delivered. SW updated.     Patient's spouse feels as though she has been kept up to date on patient's status and expresses gratitude.     Tamiko Pantoja, RN, BSN, CRRN  [facility] Patient Care Supervisor  Acute Rehabilitation Unit  PH: [phone]

## 2024-10-16 NOTE — DISCHARGE SUMMARY
[facility]   Acute Rehabilitation Unit  Discharge summary     Date of Admission: 10/5/2024  Date of Discharge: 10/17/2024  Disposition: [facility]  Primary Care Physician: Jose Coles  Attending physician: Stanislaw Fountain DO  Other significant physician provider(s): Roberto Carlos Braswell MD, Jenniffer Rowland MD, RAFA Schilling MD    DISCHARGE DIAGNOSIS    Impairment group code: 09 Cardiac: s/p Heartmate III LVAD placement in setting of acute on chronic systolic heart failure d/t ischemic cardiomyopathy   PT, OT and SLP 60 minutes of each, 6 days a week for 12 days,  in addition to rehab nursing and close management of physiatrist.    Impairment of ADL's: OT for 6 days a week for 12 days, to work on ADL re-training such as grooming, self cares and bathing.   Impairment of mobility:  PT  for 6 days a week for 12 days, to work on neuromuscular re-education focusing on strength, balance, coordination, and endurance.    Impairment of cognition/language/swallow:  SLP for 6 days a week for 12 days, to work on cognitive and linguistic deficits.  Rehab RN for med administration, bowel regimen, glucose monitoring and wound care.       #Acute on chronic systolic heart failure secondary to ICM   #HFrEF  s/p HM3 LVAD as DT on 9/18/2024  # CAD s/p PCI  # History of STEMI, s/p PCI to LAD in 2023   # s/p ICD 5/8/2024, hxVT/VF arrest   # AFib s/p DCCV 9/2024 and again 9/30/2024  # Hyperlipidemia  # Visual changes, resolved.   # Generalized Weakness  # Iron Deficiency Anemia  #Pain Control  # Hypervolemic hyponatremia  # Bilateral pleural effusions, Resolved    BRIEF SUMMARY  Duane C Johnson is a 74 year old right hand dominant male with a PMH of HFrEF 2/2 ICM with LVEF 20-30%, atrial fibrillation, 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 Due to worsening functional limitations due to heart  failure, patient was worked up for VAD while inpatient. Patient is now s/p implantation of HeartMate 3 LVAD on 9/18/2024. His post-operative course was complicated by Afib, pain, anxiety, insomnia, and transient right eye visual deficit. He has also required medical mgmt of his acute blood loss anemia, fluid overload, hyperglycemia, hyponatremia and B pleural effusions. He was admitted to rehab on 10/05/2024 in setting of impaired strength, impaired balance and impaired activity tolerance following his LVAD placement.     REHABILITATION COURSE  Duane participated in physical therapy, occupational therapy, and speech language pathology appointments during his acute rehabilitation stay. His ability to participate in therapies was significantly limited by presyncope/orthostasis, for which he received several IVF boluses and adjustments to his TID hydralazine dosing. As he was unable to participate fully in 3 hours of therapy per day, decision was made to transfer Duane to the [facility] where he could continue to undergo medical monitoring and receive adjustments/care from his inpatient cardiology team.     MEDICAL COURSE  # Acute on chronic systolic heart failure secondary to ICM   #HFrEF  s/p HM3 LVAD as DT on 9/18/2024  # CAD s/p PCI  # History of STEMI, s/p PCI to LAD in 2023   # s/p ICD 5/8/2024, hxVT/VF arrest   Stage D. NYHA Class III. Limited TTE 9/27 with LVIDd 4.0 cm, Closed AoV, no AI, moderately reduced RV, normal IVC. TTE (10/10) demonstrates septum bowing into RV. LV measures 4.7 cm. Intermittently has been triggering LVAD alarms, likely dt hypovolemia.   -Continue 50 mg losartan BID  -Hydralazine 50 mg increased to 75 mg TID. Changed to 12.5 mg TID on 10/11. Changed to 25mg q8h on 10/14. Continue hydralazine 25 mg q8h in TCU, directed by cardiology.   -Continue Empagliflozin 10 mg QD  -Continue Anticoagulation: warfarin dosing per pharmacy, INR goal 2-3.   Antiplatelet: ASA not  indicated in LVAD population, > 6 months s/p STEMI     # Hyperlipidemia  -Continue atorvastatin 80mg      # History of VT/VF arrest 2023  # AFib s/p DCCV 9/2024 and again 9/30/2024  Successfully cardioverted in early September for AF. Since then EKG suggested AF on 9/21. The patient was started on hep gtt 9/21, but unclear if was in AF before this. S/p MELBA and DCCV on 9/30 with conversion to sinus rhythm   -Continue amiodarone 200 mg daily   -Continue AC as above     # Visual changes, resolved.   # Generalized Weakness  Stroke code call 9/29 for visual changes - right eye with decreased upper half of vision and bluish haze. Resolved after 30 minutes. Seen by opthalmology and neuro. Negative head CT and CTA head and neck. Additionally had stroke code activated on 10/9 due to transient left upper extremity weakness. This resolved spontaneously and CT head returned negative.   - Already on A/C     # Iron Deficiency Anemia  - Hgb 8.4 10/4. No overt bleeds, slow downtrend since surgery. LDH downtrending.   - Iron studies suggestive of RADHA. Recieved IV venofer x5 days  - CBC qMon/Thurs     #Pain  - Acetaminophen PRN  - Continue gabapentin 100 mg bedtime     # Hypervolemic hyponatremia  Ranging 128-133  - Holding PTA Bumex 0.5 mg daily  - Continue empagliflozin 10 mg daily      # Bilateral pleural effusions, Resolved  -Chest tube removed 9/30.   - Left thoracentesis done 10/1, 1 L removed, R thoracentesis done 10/3, 1 L removed.   -Continue incentive spirometer    DISCHARGE MEDICATIONS  Current Discharge Medication List        START taking these medications    Details   warfarin ANTICOAGULANT (COUMADIN) 2.5 MG tablet Take 1 tablet (2.5 mg) by mouth daily.  Qty: 30 tablet, Refills: 0    Comments: Dosing per pharmacy and anticoagulation clinic follow up  Associated Diagnoses: Anticoagulated on warfarin           CONTINUE these medications which have CHANGED    Details   hydrALAZINE (APRESOLINE) 25 MG tablet Take 1 tablet (25  mg) by mouth 3 times daily.  Qty: 90 tablet, Refills: 0    Associated Diagnoses: LVAD (left ventricular assist device) present (H)           CONTINUE these medications which have NOT CHANGED    Details   amiodarone (PACERONE) 200 MG tablet Take 1 tablet (200 mg) by mouth daily  Qty: 90 tablet, Refills: 3    Associated Diagnoses: Cardiac arrest (H)      empagliflozin (JARDIANCE) 10 MG TABS tablet Take 1 tablet (10 mg) by mouth daily  Qty: 90 tablet, Refills: 3    Associated Diagnoses: Chronic combined systolic and diastolic heart failure (H)      escitalopram (LEXAPRO) 10 MG tablet Take 1 tablet (10 mg) by mouth or Feeding Tube daily.    Associated Diagnoses: LVAD (left ventricular assist device) present (H)      FISH OIL-VITAMIN D PO Take 1 capsule by mouth 2 times daily.      losartan (COZAAR) 50 MG tablet Take 1 tablet (50 mg) by mouth 2 times daily.    Associated Diagnoses: LVAD (left ventricular assist device) present (H)      magnesium hydroxide (MILK OF MAGNESIA) 400 MG/5ML suspension Take 30 mLs by mouth daily as needed for constipation (Use if polyethylene glycol (Miralax) is not effective after 24 hours.).    Associated Diagnoses: LVAD (left ventricular assist device) present (H)      magnesium oxide (MAG-OX) 400 MG tablet Take 1 tablet (400 mg) by mouth daily  Qty: 90 tablet, Refills: 3    Associated Diagnoses: Hypomagnesemia      !! melatonin 5 MG tablet Take 1-2 tablets (5-10 mg) by mouth at bedtime  Qty: 60 tablet, Refills: 0    Associated Diagnoses: Anxiety      multivitamin w/minerals (THERA-VIT-M) tablet Take 1 tablet by mouth 2 times daily      pantoprazole (PROTONIX) 40 MG EC tablet Take 1 tablet (40 mg) by mouth every morning (before breakfast) for 12 days.    Associated Diagnoses: LVAD (left ventricular assist device) present (H)      acetaminophen (TYLENOL) 325 MG tablet Take 2 tablets (650 mg) by mouth every 4 hours as needed for other (For optimal non-opioid multimodal pain management to  improve pain control.).    Associated Diagnoses: LVAD (left ventricular assist device) present (H)      atorvastatin (LIPITOR) 80 MG tablet Take 1 tablet (80 mg) by mouth daily  Qty: 90 tablet, Refills: 3    Associated Diagnoses: ST elevation myocardial infarction involving left anterior descending (LAD) coronary artery (H)      gabapentin (NEURONTIN) 100 MG capsule Take 1 capsule (100 mg) by mouth or Feeding Tube at bedtime.    Associated Diagnoses: LVAD (left ventricular assist device) present (H)      !! melatonin 10 MG TABS tablet Take 1 tablet (10 mg) by mouth every evening.    Associated Diagnoses: LVAD (left ventricular assist device) present (H)      methocarbamol (ROBAXIN) 750 MG tablet Take 1 tablet (750 mg) by mouth 3 times daily as needed for muscle spasms.    Associated Diagnoses: LVAD (left ventricular assist device) present (H)       !! - Potential duplicate medications found. Please discuss with provider.        STOP taking these medications       bisacodyl (DULCOLAX) 10 MG suppository Comments:   Reason for Stopping:         bumetanide (BUMEX) 0.5 MG tablet Comments:   Reason for Stopping:         naloxone (NARCAN) 0.4 MG/ML injection Comments:   Reason for Stopping:         naloxone (NARCAN) 0.4 MG/ML injection Comments:   Reason for Stopping:         naloxone (NARCAN) 0.4 MG/ML injection Comments:   Reason for Stopping:         naloxone (NARCAN) 0.4 MG/ML injection Comments:   Reason for Stopping:         ondansetron (ZOFRAN ODT) 4 MG ODT tab Comments:   Reason for Stopping:         oxyCODONE (ROXICODONE) 5 MG tablet Comments:   Reason for Stopping:         polyethylene glycol (MIRALAX) 17 g packet Comments:   Reason for Stopping:         senna-docusate (SENOKOT-S/PERICOLACE) 8.6-50 MG tablet Comments:   Reason for Stopping:                 DISCHARGE INSTRUCTIONS AND FOLLOW UP  Discharge Procedure Orders   Primary Care - Care Coordination Referral   Standing Status: Future   Referral Priority:  Routine: Next available opening Referral Type: Care Coordination   Number of Visits Requested: 1          PHYSICAL EXAMINATION    Most recent Vital Signs:   Vitals:    10/14/24 1539 10/15/24 0744 10/15/24 1700 10/16/24 0833   BP Location:  Left arm  Left arm   Patient Position:  Semi-Velázquez's  Semi-Velázquez's   Cuff Size:  Adult Regular  Adult Regular   Pulse:  84  77   Resp:   16 16   Temp:   98.3  F (36.8  C) 97.3  F (36.3  C)   TempSrc:    Oral   SpO2: 97% 96%  97%   Weight:       Height:         General: NAD, conversational and alert, up in chair  HEENT: atraumatic, nares clear, conjunctiva white  Pulmonary: on room air, lungs clear to auscultation bilaterally  Cardiovascular: LVAD hum  Abdominal: soft, non-tender to palpation, non-distended  Extremities: warm peripherally, no edema in BLEs  Skin: warm, dry, without erythema, ecchymosis, or rash noted  MSK: no BLE edema noted, calves non-tender to palpation  Neuro: Alert. Confused at times     Discharge summary was forwarded to Jose Coles (PCP) at the time of discharge, so as to bridge from hospital to outpatient care.     It was our pleasure to care for Duane C Johnson during this hospitalization. Please do not hesitate to contact me should there be questions regarding the hospital course or discharge plan.

## 2024-10-16 NOTE — PLAN OF CARE
"Goal Outcome Evaluation:      Plan of Care Reviewed With: patient    Overall Patient Progress: improvingOverall Patient Progress: improving    VS: /87 (BP Location: Right arm)   Pulse 84   Temp 98.3  F (36.8  C)   Resp 16   Ht 1.676 m (5' 6\")   Wt 59.8 kg (131 lb 13.4 oz)   SpO2 96%   BMI 21.28 kg/m       O2: 96% ON RA    Output: Voids spontaneously without difficulty using beside urinal.   Last BM: 10/12 Senna given   Activity: Up with A x 1 SBA    Skin: WDL except, Drive line incision   Pain: Denies pain    CMS: Intact, Alert and oriented. Denies numbness and tingling.   Dressing: CDI   Diet: Regular diet, thin liquids takes pills whole.   LDA: LVAD, Drive line. L PIV    Equipment: IV pole, personal belongings,    Plan: Precautions maintained. Continue with plan of care. Call light within reach, bed alarm on. Pt able to use a call light appropriately and make  his needs known.    Additional Info: Received Bolus 1000 ml fluid infusion with no concern.  Map was 78  LVAD alarm went off x 2 with highest PI of 10.0       "

## 2024-10-16 NOTE — PROGRESS NOTES
[facility]    Medicine Progress Note - Hospitalist Service    Date of Admission:  10/5/2024    Assessment & Plan   Duane C Johnson is a 74 year old male pmhx of anterior STEMI s/p PCI to the pLAD on 10/26/23 with a VT/VF arrest the next day (10/27) Placed on amiodarone and discharged 11/03/23, ICD was not indicated as this was within 48h of his MI. His EF prior to discharge was 20-30% with a large area of akinesis in the LAD, placed on apixaban due to this (Apixaban dcd on 7/5/24). Had multiple admissions for HF exacerbation, admitted on 8/30/24 for CHF exacerbation with BNP 16k. CPX and RHC showed significant functional limitations due to heart failure. Underwent HM3 LVAD placement on 9/18/24 and required mediastinal re-exploration for post operative hemorrhage.   Transferred to ARU on 10/5 for rehab.     #Low flow and PI events  #HTN  #Weakness  #Dizziness  Noted to have ongoing dizziness with variable MAPs and elevated PI with low flow. Cardiology notified on 10/13 and 10/14, recommended scheduled q8h dosing of hydralazine and close monitoring. Regarding pt's dizziness does not appear to be associated with variability in pt MAPs, noted stability with MAPs in normal range in s/o sxs previously.  - continue close monitoring  - continue hydralazine 25 mg q8h    #Headache, resolved  #Numbness/tingling bilateral hands, resolved  Noted on 10/9 post-therapy that resolved after returning to bed. Prior provider d/w cardiology and was given 500 cc bolus IVFs on 10/9. Head CT was negative for acute intracranial process, EKG in sinus rhythm. Now resolved.    #Acute on chronic systolic heart failure secondary to ICM   #HFrEF s/p HM3 LVAD as DT on 9/18/2024  #CAD s/p PCI  #History of STEMI, CAD status post  PCI to LAD in 2023   #s/p ICD 5/8/2024 with history VT/VF arrest 2023   Stage D. NYHA Class III. SOB ongoing with therapy but not at rest. Variability in PI as noted in prior  notes and above. Prior echo on 10/10, significantly decreased LVEF 15-20%, normal LVAD inflow and outflow cannula dopplers, bowing septum towards RV, moderately reduced global RV function. Has had limited therapy here d/t symptoms of dizziness when getting OOB. However, no current dizziness as of 10/15. Will monitor if pt can increase some PT/OT therapies with scheduled hydralazine, but if remains without progress may need to return hospital status.   - goal MAP 65-85     - continue holding bumex, consider restarting if s/sxs of volume overload  - continue losartan 50 mg bid  - continue hydralazine 25 mg q8h   - no BB  - Continue jardiance 10 mg daily  - continue SCDs  - warfarin per pharm, goal INR 2-3  - no ASA indicated in LVAD     #Hyponatremia, mild  Na stable low 130s. Asymptomatic currently.  - monitor intermittently    #History of VT/VF arrest 2023  #AFib s/p DCCV 9/5/2024 and again 9/30/2024  Successfully cardioverted in early September for AF. Since then EKG suggested AF on 9/21. Tele is only available from as early as 9/22. The patient was started on hep gtt 9/21, but unclear if was in AF before this. While the patient was on hep gtt until INR was therapeutic, it is not possible to assess rhythm prior to 9/21. Systemic AC was off on 9/18-9/20. S/p MELBA and DCCV on 9/30 with conversion to sinus rhythm   - continue amiodarone 200 mg daily      #Visual changes, resolved  Stroke code call 9/29 for visual changes - right eye with decreased upper half of vision and bluish haze. Resolved after 30 minutes. Seen by opthalmology and neuro. Negative head CT and CTA head and neck. He has had 3 episodes which last about 5 minutes before resolving completely.   - Ophthalmology and Neuro now signed off     # Anemia  Hgb stable ~8. Iron studies suggestive of RADHA. Received IV venofer x5 days last day 10/2.  - f/u repeat labs 10/17     #Bilateral pleural effusions  Chest tube removed 9/30. S/p Left thoracentesis 10/1 with  total 1 L removed, R thoracentesis completed 10/3 with additional 1 L removed.     #History localized prostate cancer, Sarah 4+7=7   Was not felt to be surgical candidate d/t cardiac disease. Received  Lupron q 3 months and completed radiation therapy.  - f/u as outpatient when able        Diet: Room Service  Combination Diet Regular Diet; Easy to Chew (level 7); Thin Liquids (level 0)  Snacks/Supplements Adult: Ensure Enlive; With Meals  Snacks/Supplements Adult: Magic Cup; With Meals  Snacks/Supplements Adult: Other; Cottage cheese + strawberry Greek yogurt TID with meals; With Meals    DVT Prophylaxis: Warfarin  Michael Catheter: Not present  Lines: None     Cardiac Monitoring: None  Code Status: Full Code      Clinically Significant Risk Factors               # Hypoalbuminemia: Lowest albumin = 2.6 g/dL at 10/9/2024  7:28 PM, will monitor as appropriate        # End stage heart failure: Ventricular assist device (VAD) present           # Severe Malnutrition: based on nutrition assessment    # Financial/Environmental Concerns:     # ICD device present       Disposition Plan     Medically Ready for Discharge: Anticipated in 5+ Days       Abiola Schilling MD  Hospitalist Service  [facility]  Securely message with [email or web address] (more info)  Text page via [employer] Paging/Directory   ______________________________________________________________________    Interval History   Alarms x2 overnight, PI max 10.0  No significant events  Given 1 L NS in afternoon    Physical Exam   Vital Signs: Temp: 97.3  F (36.3  C) Temp src: Oral       Resp: 16        Weight: 131 lbs 13.36 oz    General: NAD, laying in bed watching TV  HEENT: EOMI, MMM   Resp: CTAB, no w/r/r, no increased WOB  CV: LVAD device sounds on auscultation no BLE edema  GI: soft, non-tender, non-distended  Skin: no rash, no lesions  Neuro: AOx3, no focal neuro deficits   Psych: mood appropriate, normal affect, judgement  intact    Medical Decision Making       50 MINUTES SPENT BY ME on the date of service doing chart review, history, exam, documentation & further activities per the note.      Data   ------------------------- PAST 24 HR DATA REVIEWED -----------------------------------------------    I have personally reviewed the following data over the past 24 hrs:    INR:  2.51 (H) PTT:  N/A   D-dimer:  N/A Fibrinogen:  N/A       Imaging results reviewed over the past 24 hrs:   No results found for this or any previous visit (from the past 24 hour(s)).

## 2024-10-16 NOTE — PROGRESS NOTES
"  [facility]   Acute Rehabilitation Unit  Daily Progress Note    INTERVAL HISTORY  Notes and labs reviewed over past 24 hours. No acute events overnight.  Denies chest pain, shortness of breath, no fever or chills.  Did do well this AM with therapy.  Initial ambulation bouts today pt demonstrating 50', 100', 120' ambulation with LVAD numbers within parameters. Pt demonstrates 4'' stairs x5 steps with ease, however after returning to sitting after ~45 seconds delayed response pt with pre-syncopal event, symptoms alleviated with quick return to supine.  Will update LVAD team for further recommendations as fluid bolus yesterday seemed to help.     ROS: 10 point ROS was assessed and was negative unless otherwise stated in HPI      Functionally,  PT:  Bed Mobility: Maria Esther rolling, Sup<>sit SBA w/ bedrail   Transfer: SBA w/ 4WW   Gait: Up to 200' SBA w/ 4WW   Stairs: 6\" x4 w/ joel railings, SBA  Balance: Stable dynamic sitting, SBA for unsupported dynamic standing      Outcome Measures:   5TSTS (modified, light UE push off): 32 seconds on 10/9  6MWT: max amb distance 150' on 10/9.        MEDICATIONS  Scheduled meds  Current Facility-Administered Medications   Medication Dose Route Frequency Provider Last Rate Last Admin    amiodarone (PACERONE) tablet 200 mg  200 mg Oral Daily Toussaint Gonzalez, Paola, MD   200 mg at 10/16/24 0851    atorvastatin (LIPITOR) tablet 80 mg  80 mg Oral QPM Toussaint Gonzalez, Paola, MD   80 mg at 10/15/24 2108    empagliflozin (JARDIANCE) tablet 10 mg  10 mg Oral Daily Sirisha Arias APRN CNP   10 mg at 10/16/24 0851    escitalopram (LEXAPRO) tablet 10 mg  10 mg Oral or Feeding Tube Daily Toussaint Gonzalez, Paola, MD   10 mg at 10/16/24 0851    gabapentin (NEURONTIN) capsule 100 mg  100 mg Oral or Feeding Tube At Bedtime Toussaint Gonzalez, Paola, MD   100 mg at 10/15/24 2109    hydrALAZINE (APRESOLINE) half-tab 25 mg  25 mg Oral Q8H Jenniffer Rowladn MD   " 25 mg at 10/16/24 0540    losartan (COZAAR) tablet 50 mg  50 mg Oral BID Toussaint Gonzalez, Paola, MD   50 mg at 10/16/24 0851    magnesium oxide (MAG-OX) tablet 400 mg  400 mg Oral Daily Toussaint Gonzalez, Paola, MD   400 mg at 10/16/24 0851    melatonin tablet 10 mg  10 mg Oral At Bedtime Toussaint Gonzalez, Paola, MD   10 mg at 10/15/24 2108    methocarbamol (ROBAXIN) tablet 750 mg  750 mg Oral TID Toussaint Gonzalez, Paola, MD   750 mg at 10/16/24 0851    multivitamin w/minerals (THERA-VIT-M) tablet 1 tablet  1 tablet Oral BID Toussaint Gonzalez, Paola, MD   1 tablet at 10/16/24 0851    pantoprazole (PROTONIX) EC tablet 40 mg  40 mg Oral QAM AC Toussaint Gonzalez, Paola, MD   40 mg at 10/16/24 0540    warfarin ANTICOAGULANT (COUMADIN) tablet 2.5 mg  2.5 mg Oral ONCE at 18:00 Stanislaw Fountain DO        Warfarin Dose Required Daily - Pharmacist Managed  1 each Oral See Admin Instructions Toussaint Gonzalez, Paola, MD           PRN meds:  Current Facility-Administered Medications   Medication Dose Route Frequency Provider Last Rate Last Admin    acetaminophen (TYLENOL) tablet 975 mg  975 mg Oral Q8H PRN Roberto Carlos Braswell MD        bisacodyl (DULCOLAX) suppository 10 mg  10 mg Rectal Daily PRN Toussaint Gonzalez, Paola, MD        lidocaine (LMX4) cream   Topical Q1H PRN Toussaint Gonzalez, Paola, MD        lidocaine 1 % 0.1-1 mL  0.1-1 mL Other Q1H PRN Toussaint Gonzalez, Paola, MD        naloxone (NARCAN) injection 0.2 mg  0.2 mg Intravenous Q2 Min PRN Toussaint Gonzalez, Paola, MD        Or    naloxone (NARCAN) injection 0.4 mg  0.4 mg Intravenous Q2 Min PRN Toussaint Gonzalez, Paola, MD        Or    naloxone (NARCAN) injection 0.2 mg  0.2 mg Intramuscular Q2 Min PRN Toussaint Gonzalez, Paola, MD        Or    naloxone (NARCAN) injection 0.4 mg  0.4 mg Intramuscular Q2 Min PRN Toussaint Gonzalez, Paola, MD        ondansetron (ZOFRAN ODT) ODT tab 4 mg  4 mg Oral Q6H PRN Toussaint Gonzalez, Paola, MD         "oxyCODONE (ROXICODONE) tablet 5 mg  5 mg Oral Q4H PRN Toussaint Gonzalez, Paola, MD        Patient is already receiving anticoagulation with heparin, enoxaparin (LOVENOX), warfarin (COUMADIN)  or other anticoagulant medication   Does not apply Continuous PRN Toussaint Gonzalez, Paola, MD        polyethylene glycol (MIRALAX) Packet 17 g  17 g Oral or Feeding Tube BID PRN Toussaint Gonzalez, Paola, MD   17 g at 10/07/24 2159    senna-docusate (SENOKOT-S/PERICOLACE) 8.6-50 MG per tablet 2 tablet  2 tablet Oral or Feeding Tube BID PRN Toussaint Gonzalez, Paola, MD   2 tablet at 10/15/24 2108    sodium chloride (PF) 0.9% PF flush 3 mL  3 mL Intracatheter q1 min prn Toussaint Gonzalez, Paola, MD             PHYSICAL EXAM  /87 (BP Location: Right arm)   Pulse 77   Temp 97.3  F (36.3  C) (Oral)   Resp 16   Ht 1.676 m (5' 6\")   Wt 59.8 kg (131 lb 13.4 oz)   SpO2 97%   BMI 21.28 kg/m    General: NAD, conversational and alert, up in chair  HEENT: atraumatic, nares clear, conjunctiva white  Pulmonary: on room air, lungs clear to auscultation bilaterally  Cardiovascular: LVAD hum  Abdominal: soft, non-tender to palpation, non-distended  Extremities: warm peripherally, no edema in BLEs  Skin: warm, dry, without erythema, ecchymosis, or rash noted  MSK: no BLE edema noted, calves non-tender to palpation  Neuro: Alert. 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 Due to worsening functional limitations due to heart failure, patient was worked up for VAD while inpatient. Patient " is now s/p implantation of HeartMate 3 LVAD on 9/18/2024. His post-operative course was complicated by Afib, pain, anxiety, insomnia, and transient right eye visual deficit. He has also required medical mgmt of his acute blood loss anemia, fluid overload, hyperglycemia, hyponatremia and B pleural effusions. He is now medically stable, admitted to rehab on 10/05/2024 in setting of impaired strength, impaired balance and impaired activity tolerance.       Admission to acute inpatient rehab 10/05/2024.    Impairment group code: 09 Cardiac: s/p Heartmate III LVAD placement in setting of acute on chronic systolic heart failure d/t ischemic cardiomyopathy      PT, OT and SLP 60 minutes of each, 6 days a week for 12 days,  in addition to rehab nursing and close management of physiatrist.    Impairment of ADL's: OT for 6 days a week for 12 days, to work on ADL re-training such as grooming, self cares and bathing.   Impairment of mobility:  PT  for 6 days a week for 12 days, to work on neuromuscular re-education focusing on strength, balance, coordination, and endurance.    Impairment of cognition/language/swallow:  SLP for 6 days a week for 12 days, to work on cognitive and linguistic deficits.  Rehab RN for med administration, bowel regimen, glucose monitoring and wound care.       Medical Conditions  # Acute on chronic systolic heart failure secondary to ICM   #HFrEF  s/p HM3 LVAD as DT on 9/18/2024  # CAD s/p PCI  # History of STEMI, s/p PCI to LAD in 2023   # s/p ICD 5/8/2024, hxVT/VF arrest   Stage D. NYHA Class III. Limited TTE 9/27 with LVIDd 4.0 cm, Closed AoV, no AI, moderately reduced RV, normal IVC. TTE (10/10) demonstrates septum bowing into RV. LV measures 4.7 cm. Intermittently has been triggering LVAD alarms, likely dt hypovolemia.   -Continue 50 mg losartan BID  -Hydralazine 50 mg increased to 75 mg TID. Changed to 12.5 mg TID on 10/11. Changed to 25mg 18h on 10/14.   -Continue Empagliflozin 10 mg  QD  -Continue Anticoagulation: warfarin dosing per pharmacy, INR goal 2-3.   Antiplatelet: ASA not indicated in LVAD population, > 6 months s/p STEMI     # Hyperlipidemia  -Continue atorvastatin 80mg      # History of VT/VF arrest 2023  # AFib s/p DCCV 9/2024 and again 9/30/2024  - Successfully cardioverted in early September for AF. Since then EKG suggested AF on 9/21. The patient was started on hep gtt 9/21, but unclear if was in AF before this. S/p MELBA and DCCV on 9/30 with conversion to sinus rhythm   -Continue amiodarone 200 mg daily   -Continue AC as above     # Visual changes, resolved.   # Generalized Weakness  Stroke code call 9/29 for visual changes - right eye with decreased upper half of vision and bluish haze. Resolved after 30 minutes. Seen by opthalmology and neuro. Negative head CT and CTA head and neck. Additionally had stroke code activated on 10/9 due to transient left upper extremity weakness. This resolved spontaneously and CT head returned negative.   - Already on A/C     # Iron Deficiency Anemia  - Hgb 8.4 10/4. No overt bleeds, slow downtrend since surgery. LDH downtrending.   - Iron studies suggestive of RADHA. Recieved IV venofer x5 days  - CBC qMon/Thurs     #Pain  - Acetaminophen PRN  - Continue gabapentin 100 mg bedtime     # Hypervolemic hyponatremia  Ranging 128-133  - Holding PTA Bumex 0.5 mg daily  - Continue empagliflozin 10 mg daily      # Bilateral pleural effusions, Resolved  -Chest tube removed 9/30.   - Left thoracentesis done 10/1, 1 L removed, R thoracentesis done 10/3, 1 L removed.   -Continue incentive spirometer     Adjustment to disability:  Clinical psychology to eval and treat. Currently on escitalopram.   FEN: 0-Thin and 6-Soft and bite sized   Bowel: continent  Bladder: continent  DVT Prophylaxis: on warfarin  GI Prophylaxis: pantoprazole.   Code: Full code  Disposition: house with wife after TCU   ELOS:  Unclear.  Rehab prognosis:  Anticipate w/ intensive PT/OT,  skilled rehab nursing cares, and medical mgmt by PMR and HOSP providers, pt will achieve a level of mod IND for bed mobility, transfers, gait, stairs, ADLs, and be able to safely and independently manage his Heartmate III LVAD.   Follow up Appointments on Discharge:   PCP  Ophthalmology  PM&R   Outpatient therapies  Cardiology         Stanislwa Fountain DO      I spent a total of 25 minutes face to face and coordinating care of Duane C Johnson. Over 50% of my time on the unit was spent counseling the patient and /or coordinating care regarding post LVAD.

## 2024-10-16 NOTE — PROGRESS NOTES
SW notified that pt will discharge to  TCU for ongoing medical monitoring. IND scheduled for tomorrow morning, pt will transfer in the afternoon.     [id number]    DMITRY [place]  Post Acute Float   ARU/TCU/[facility]    Phone: [phone]  Fax: [phone]

## 2024-10-16 NOTE — PLAN OF CARE
Goal Outcome Evaluation:      Plan of Care Reviewed With: patient  Overall Patient Progress: no change  Overall Patient Progress: no change  Outcome Evaluation: Pt. AOX4, Ate breakfast, encouraging intake fluids, uses call light appropriately.       Orientation: AOX4  Bowel: Continent  LBM: 10-16-24  Bladder:  Continent: AX1 Set up with Urinal  Ambulation/Transfers: AX1 w. 2 w/w  Diet/Liquids: Combination Regular Diet, Thin Fluids, Easy to Chew; Encourage more fluid intake  Tubes/Lines/Drains: LVAD Present  Skin: Intact; Dry       Continue POC; Assess for LVAD Value fluctuations

## 2024-10-16 NOTE — PLAN OF CARE
Goal Outcome Evaluation:      Plan of Care Reviewed With: patient    Overall Patient Progress: no change    Outcome Evaluation: Pt is aware of medication, no change to skin driveline dressing changed daily, continues to be safe using call light and waiting for assistance.    FOCUS/GOAL  Nutrition/Feeding/Swallowing precautions     ASSESSMENT, INTERVENTIONS AND CONTINUING PLAN FOR GOAL:  Pt Aox4 using call light to make needs known.  A1 w/w and GB.  LVAD HMIII, PI and flow numbers continue to be up and down throughout the shift, PMR team working with cardiac team to make changes with medications.  Bolus ordered and started on writers shift. CT of chest ordered on writers shift but CT is unable to complete this type of test on [facility], pt will have to go to [place] to have imaging done, PMR resident updated and reports that they will work on doing this tomorrow. Denies pain, cough, sob, chest pain.  Pt has been dizzy with therapies.  Cont of B&B, LBM 10/16.  Easy to chew/thin, Encouraging fluids, taking pills whole with water.  Working with therapies. Nursing will continue with POC.

## 2024-10-16 NOTE — PLAN OF CARE
"Discharge Plan: home w/ spouse assist     Precautions: fall, LVAD- monitor for low PI's      Current Status:  Bed Mobility: Maria Esther rolling, Sup<>sit SBA w/ bedrail   Transfer: SBA w/ 4WW   Gait: Up to 200' SBA w/ 4WW   Stairs: 6\" x4 w/ joel railings, SBA  Balance: Stable dynamic sitting, SBA for unsupported dynamic standing      Outcome Measures:   5TSTS (modified, light UE push off): 32 seconds on 10/9  6MWT: max amb distance 150' on 10/9.      Assessment: with initial ambulation bouts today pt demonstrating 50', 100', 120' ambulation with LVAD numbers within parameters. Pt demonstrates 4'' stairs x5 steps with ease, however after returning to sitting after ~45 seconds delayed response pt with pre-syncopal event, symptoms alleviated with quick return to supine.    Other Barriers to Discharge (DME, Family Training, etc)   Caregiver connection with spouse: 10/14 PM- completed, however limited in OOB demonstrated in presence of low PI.     4WW: Ordered from [facility].   "

## 2024-10-16 NOTE — PROGRESS NOTES
CLINICAL NUTRITION SERVICES - REASSESSMENT NOTE     Nutrition Prescription    RECOMMENDATIONS FOR MDs/PROVIDERS TO ORDER:  - Recommend decreasing Thera-vit-m to once daily -- discussed with Attending  - Appreciate continued encouragement surrounding PO intake    Malnutrition Status:    Severe malnutrition in the context of acute illness.     Recommendations already ordered by Registered Dietitian (RD):  Continue all snacks and supplements as ordered    Future/Additional Recommendations:  Monitor PO intake, supplement use, labs, and weight trends     EVALUATION OF THE PROGRESS TOWARD GOALS   Diet: Level 7: Easy to Chew Dysphagia Diet   - Room service with assist    Snacks/supplements:  - Ensure Enlive (chocolate) TID with meals  - Magic Cup (chocolate) with lunch  - Cottage cheese + strawberry Greek yogurt TID with meals  - Additional snacks/supplements PRN    Intake: % per flowsheets over past week, most intakes 100%    Per [employer], pt typically ordering 3 meals/day from room service (orders chicken noodle soup and mashed potatoes w/ gravy for every meal). Ordered 3-day average of 2317 kcal and 160 g protein (includes supplements).     NEW FINDINGS   Met with pt at bedside. Pt reports he is eating well. He is able to eat majority of his meal trays. He likes Ensure Enlive, Magic Cup, and cottage cheese. He has also been consuming the Greek yogurt. He would like to continue will all snacks/supplements as ordered. Continued to encourage adequate intakes. Pt understanding and agreeable.     Weight:   Wt Readings from Last 30 Encounters:   10/14/24 59.8 kg (131 lb 13.4 oz)   10/05/24 66.2 kg (145 lb 14.4 oz)   08/26/24 64.4 kg (142 lb)   08/08/24 64.3 kg (141 lb 12.8 oz)   07/05/24 59.9 kg (132 lb 1.6 oz)   06/26/24 63.3 kg (139 lb 9.6 oz)   06/19/24 59.9 kg (132 lb)   06/12/24 63.5 kg (140 lb)   06/11/24 62.9 kg (138 lb 9.6 oz)   06/05/24 62.6 kg (138 lb)   05/22/24 61.5 kg (135 lb 9.6 oz)   05/20/24 60.1 kg  (132 lb 9.6 oz)   05/15/24 63.2 kg (139 lb 6.4 oz)   05/13/24 60.2 kg (132 lb 11.2 oz)   05/10/24 60.9 kg (134 lb 3.2 oz)   05/08/24 61.7 kg (136 lb 0.4 oz)   05/06/24 61.9 kg (136 lb 7.4 oz)   05/06/24 61.2 kg (135 lb)   04/22/24 60.6 kg (133 lb 11.2 oz)   04/16/24 62.1 kg (137 lb)   04/15/24 62.1 kg (137 lb)   04/15/24 62.6 kg (138 lb)   04/04/24 60.8 kg (134 lb)   03/06/24 63.1 kg (139 lb 1.6 oz)   03/05/24 61.7 kg (136 lb)   02/19/24 62 kg (136 lb 11.2 oz)   02/07/24 62.9 kg (138 lb 9.6 oz)   02/02/24 61.8 kg (136 lb 3.2 oz)   01/25/24 60 kg (132 lb 3.2 oz)   01/16/24 61.2 kg (135 lb)   10% wt loss in less than 1 month    Labs:   Na: 131 (L)  Cr: 0.53 (L)    Meds:  Jardiance  Magnesium oxide  Thera-vit-m, BID  Protonix  NS bolus 1,000 mL on 10/15  Senna-docusate, PRN - given 10/15    GI: last BM 10/16 per I/Os    Skin: Mehdi 19    MALNUTRITION  % Intake: Decreased intake does not meet criteria  % Weight Loss: > 5% in 1 month (severe)  Subcutaneous Fat Loss: Facial region: mild, Upper arm: moderate, and Lower arm: moderate   Muscle Loss: Temporal: severe, Thoracic region (clavicle, acromium bone, deltoid, trapezius, pectoral):  moderate, Upper arm (bicep, tricep):  moderate, Lower arm  (forearm): moderate, and Dorsal hand: moderate   Fluid Accumulation/Edema: None noted  Malnutrition Diagnosis: Severe malnutrition in the context of acute illness.     Previous Goals   Patient to consume % of nutritionally adequate meal trays TID, or the equivalent with supplements/snacks.   Evaluation: Met    Previous Nutrition Diagnosis  Increased nutrient needs related to increased metabolic demands as evidenced by acute period post-op LVAD.   Evaluation: No change    CURRENT NUTRITION DIAGNOSIS  Increased nutrient needs related to increased metabolic demands as evidenced by acute period post-op LVAD.     INTERVENTIONS  Implementation  Collaboration with other providers - discussed with Attending  Medical food supplement  therapy  Nutrition education for recommended modifications    Goals  Patient to consume % of nutritionally adequate meal trays TID, or the equivalent with supplements/snacks.     Monitoring/Evaluation  Progress toward goals will be monitored and evaluated per protocol.     Indiana Gaffney RD, LD  Available via phone and [employer]  Phone: [phone]  Vocera: 5R Acute Rehab Clinical Dietitian  Weekend/Holiday Vocera: Weekend Holiday Clinical Dietitian [Multi Site Groups]

## 2024-10-16 NOTE — PLAN OF CARE
Discharge Planner Post-Acute Rehab SLP:     Discharge Plan: [facility] instead of home with wife? No ongoing SLP recommended.    Precautions: LVAD    Current Status:  Hearing: Increased volume  Vision: Readers  Communication: Motor speech, language intact  Cognition: Mild cognitive impairment- severe short term memory deficits  Swallow: Easy to chew solids (7)/Thin liquids (0). Pt reported due to lack of dentition, only wants to eat chicken noodle soup and mashed potatoes every meal. Clinical swallow evaluation not indicated at this time, MD can manage diet texture as it is related to dentition status only.     Assessment: Instructed pt in 3-D puzzle for high level reasoning, planning, problem solving task. Pt completed first configuration wtih 100% independent accuracy. Second configuration pt with error on route, did not account for final/end piece. Once aware of error, pt corrected route/pieces independently to complete accurately. Decreased time to 1x SLP session a day.     Other Barriers to Discharge (Family Training, etc): level of assist/supervision with meds, financial, and LVAD mgmt- wife trained LVAD, already managed pt's meds prior, is  per pt and can manage finances.

## 2024-10-17 ENCOUNTER — APPOINTMENT (OUTPATIENT)
Dept: CT IMAGING | Facility: CLINIC | Age: 74
End: 2024-10-17
Attending: NURSE PRACTITIONER
Payer: MEDICARE

## 2024-10-17 ENCOUNTER — DOCUMENTATION ONLY (OUTPATIENT)
Dept: ANTICOAGULATION | Facility: CLINIC | Age: 74
End: 2024-10-17

## 2024-10-17 ENCOUNTER — HOSPITAL ENCOUNTER (INPATIENT)
Facility: SKILLED NURSING FACILITY | Age: 74
LOS: 6 days | Discharge: ANOTHER HOSPITAL | DRG: 292 | End: 2024-10-23
Attending: STUDENT IN AN ORGANIZED HEALTH CARE EDUCATION/TRAINING PROGRAM | Admitting: STUDENT IN AN ORGANIZED HEALTH CARE EDUCATION/TRAINING PROGRAM
Payer: MEDICARE

## 2024-10-17 VITALS
SYSTOLIC BLOOD PRESSURE: 109 MMHG | TEMPERATURE: 98.1 F | OXYGEN SATURATION: 100 % | DIASTOLIC BLOOD PRESSURE: 87 MMHG | RESPIRATION RATE: 16 BRPM | BODY MASS INDEX: 22.36 KG/M2 | WEIGHT: 139.11 LBS | HEIGHT: 66 IN | HEART RATE: 77 BPM

## 2024-10-17 DIAGNOSIS — Z95.811 LVAD (LEFT VENTRICULAR ASSIST DEVICE) PRESENT (H): ICD-10-CM

## 2024-10-17 DIAGNOSIS — I50.22 CHRONIC SYSTOLIC CONGESTIVE HEART FAILURE (H): Primary | ICD-10-CM

## 2024-10-17 DIAGNOSIS — Z79.01 ANTICOAGULATED ON WARFARIN: ICD-10-CM

## 2024-10-17 PROBLEM — R53.81 PHYSICAL DECONDITIONING: Status: ACTIVE | Noted: 2024-10-17

## 2024-10-17 LAB
ANION GAP SERPL CALCULATED.3IONS-SCNC: 5 MMOL/L (ref 7–15)
BUN SERPL-MCNC: 25.8 MG/DL (ref 8–23)
CALCIUM SERPL-MCNC: 8.5 MG/DL (ref 8.8–10.4)
CHLORIDE SERPL-SCNC: 102 MMOL/L (ref 98–107)
CREAT SERPL-MCNC: 0.84 MG/DL (ref 0.67–1.17)
EGFRCR SERPLBLD CKD-EPI 2021: >90 ML/MIN/1.73M2
ERYTHROCYTE [DISTWIDTH] IN BLOOD BY AUTOMATED COUNT: 16.9 % (ref 10–15)
GLUCOSE SERPL-MCNC: 83 MG/DL (ref 70–99)
HCO3 SERPL-SCNC: 28 MMOL/L (ref 22–29)
HCT VFR BLD AUTO: 31.3 % (ref 40–53)
HGB BLD-MCNC: 10.3 G/DL (ref 13.3–17.7)
INR PPP: 2.67 (ref 0.85–1.15)
MCH RBC QN AUTO: 31.6 PG (ref 26.5–33)
MCHC RBC AUTO-ENTMCNC: 32.9 G/DL (ref 31.5–36.5)
MCV RBC AUTO: 96 FL (ref 78–100)
PLATELET # BLD AUTO: 200 10E3/UL (ref 150–450)
POTASSIUM SERPL-SCNC: 4.7 MMOL/L (ref 3.4–5.3)
RBC # BLD AUTO: 3.26 10E6/UL (ref 4.4–5.9)
SODIUM SERPL-SCNC: 135 MMOL/L (ref 135–145)
WBC # BLD AUTO: 7.1 10E3/UL (ref 4–11)

## 2024-10-17 PROCEDURE — 120N000009 HC R&B SNF

## 2024-10-17 PROCEDURE — 99306 1ST NF CARE HIGH MDM 50: CPT | Mod: AI | Performed by: STUDENT IN AN ORGANIZED HEALTH CARE EDUCATION/TRAINING PROGRAM

## 2024-10-17 PROCEDURE — 99307 SBSQ NF CARE SF MDM 10: CPT | Performed by: INTERNAL MEDICINE

## 2024-10-17 PROCEDURE — 85610 PROTHROMBIN TIME: CPT

## 2024-10-17 PROCEDURE — 250N000013 HC RX MED GY IP 250 OP 250 PS 637: Performed by: STUDENT IN AN ORGANIZED HEALTH CARE EDUCATION/TRAINING PROGRAM

## 2024-10-17 PROCEDURE — 250N000013 HC RX MED GY IP 250 OP 250 PS 637: Performed by: INTERNAL MEDICINE

## 2024-10-17 PROCEDURE — 250N000013 HC RX MED GY IP 250 OP 250 PS 637

## 2024-10-17 PROCEDURE — 250N000011 HC RX IP 250 OP 636: Performed by: NURSE PRACTITIONER

## 2024-10-17 PROCEDURE — 85027 COMPLETE CBC AUTOMATED: CPT | Performed by: INTERNAL MEDICINE

## 2024-10-17 PROCEDURE — 99239 HOSP IP/OBS DSCHRG MGMT >30: CPT | Mod: GC

## 2024-10-17 PROCEDURE — 71275 CT ANGIOGRAPHY CHEST: CPT | Mod: 26 | Performed by: RADIOLOGY

## 2024-10-17 PROCEDURE — 250N000013 HC RX MED GY IP 250 OP 250 PS 637: Performed by: NURSE PRACTITIONER

## 2024-10-17 PROCEDURE — 99308 SBSQ NF CARE LOW MDM 20: CPT | Mod: 25 | Performed by: PHYSICIAN ASSISTANT

## 2024-10-17 PROCEDURE — 250N000009 HC RX 250: Performed by: NURSE PRACTITIONER

## 2024-10-17 PROCEDURE — 36415 COLL VENOUS BLD VENIPUNCTURE: CPT

## 2024-10-17 PROCEDURE — G1010 CDSM STANSON: HCPCS

## 2024-10-17 PROCEDURE — 80048 BASIC METABOLIC PNL TOTAL CA: CPT | Performed by: INTERNAL MEDICINE

## 2024-10-17 PROCEDURE — G1010 CDSM STANSON: HCPCS | Performed by: RADIOLOGY

## 2024-10-17 PROCEDURE — 250N000013 HC RX MED GY IP 250 OP 250 PS 637: Performed by: PHYSICAL MEDICINE & REHABILITATION

## 2024-10-17 PROCEDURE — 93750 INTERROGATION VAD IN PERSON: CPT | Performed by: PHYSICIAN ASSISTANT

## 2024-10-17 RX ORDER — AMOXICILLIN 250 MG
2 CAPSULE ORAL 2 TIMES DAILY PRN
Status: DISCONTINUED | OUTPATIENT
Start: 2024-10-17 | End: 2024-10-23 | Stop reason: HOSPADM

## 2024-10-17 RX ORDER — AMIODARONE HYDROCHLORIDE 200 MG/1
200 TABLET ORAL DAILY
Status: DISCONTINUED | OUTPATIENT
Start: 2024-10-18 | End: 2024-10-23 | Stop reason: HOSPADM

## 2024-10-17 RX ORDER — MAGNESIUM OXIDE 400 MG/1
400 TABLET ORAL DAILY
Status: DISCONTINUED | OUTPATIENT
Start: 2024-10-18 | End: 2024-10-23 | Stop reason: HOSPADM

## 2024-10-17 RX ORDER — WARFARIN SODIUM 2.5 MG/1
2.5 TABLET ORAL
Status: DISCONTINUED | OUTPATIENT
Start: 2024-10-17 | End: 2024-10-17

## 2024-10-17 RX ORDER — ACETAMINOPHEN 325 MG/1
975 TABLET ORAL EVERY 8 HOURS PRN
Status: DISCONTINUED | OUTPATIENT
Start: 2024-10-17 | End: 2024-10-23 | Stop reason: HOSPADM

## 2024-10-17 RX ORDER — WARFARIN SODIUM 2.5 MG/1
2.5 TABLET ORAL ONCE
Status: SHIPPED
Start: 2024-10-17 | End: 2024-10-17

## 2024-10-17 RX ORDER — WARFARIN SODIUM 2.5 MG/1
2.5 TABLET ORAL
Status: COMPLETED | OUTPATIENT
Start: 2024-10-17 | End: 2024-10-17

## 2024-10-17 RX ORDER — BISACODYL 10 MG
10 SUPPOSITORY, RECTAL RECTAL DAILY PRN
Status: DISCONTINUED | OUTPATIENT
Start: 2024-10-17 | End: 2024-10-23 | Stop reason: HOSPADM

## 2024-10-17 RX ORDER — NALOXONE HYDROCHLORIDE 0.4 MG/ML
0.2 INJECTION, SOLUTION INTRAMUSCULAR; INTRAVENOUS; SUBCUTANEOUS
Status: DISCONTINUED | OUTPATIENT
Start: 2024-10-17 | End: 2024-10-23 | Stop reason: HOSPADM

## 2024-10-17 RX ORDER — NALOXONE HYDROCHLORIDE 0.4 MG/ML
0.4 INJECTION, SOLUTION INTRAMUSCULAR; INTRAVENOUS; SUBCUTANEOUS
Status: DISCONTINUED | OUTPATIENT
Start: 2024-10-17 | End: 2024-10-23 | Stop reason: HOSPADM

## 2024-10-17 RX ORDER — PANTOPRAZOLE SODIUM 40 MG/1
40 TABLET, DELAYED RELEASE ORAL
Status: DISCONTINUED | OUTPATIENT
Start: 2024-10-18 | End: 2024-10-23 | Stop reason: HOSPADM

## 2024-10-17 RX ORDER — WARFARIN SODIUM 2.5 MG/1
2.5 TABLET ORAL
Status: DISCONTINUED | OUTPATIENT
Start: 2024-10-17 | End: 2024-10-17 | Stop reason: HOSPADM

## 2024-10-17 RX ORDER — ONDANSETRON 2 MG/ML
4 INJECTION INTRAMUSCULAR; INTRAVENOUS EVERY 6 HOURS PRN
Status: DISCONTINUED | OUTPATIENT
Start: 2024-10-17 | End: 2024-10-23 | Stop reason: HOSPADM

## 2024-10-17 RX ORDER — METHOCARBAMOL 750 MG/1
750 TABLET, FILM COATED ORAL 3 TIMES DAILY
Status: DISCONTINUED | OUTPATIENT
Start: 2024-10-17 | End: 2024-10-23 | Stop reason: HOSPADM

## 2024-10-17 RX ORDER — MULTIPLE VITAMINS W/ MINERALS TAB 9MG-400MCG
1 TAB ORAL DAILY
Status: DISCONTINUED | OUTPATIENT
Start: 2024-10-18 | End: 2024-10-23 | Stop reason: HOSPADM

## 2024-10-17 RX ORDER — ESCITALOPRAM OXALATE 10 MG/1
10 TABLET ORAL DAILY
Status: DISCONTINUED | OUTPATIENT
Start: 2024-10-18 | End: 2024-10-23 | Stop reason: HOSPADM

## 2024-10-17 RX ORDER — PROCHLORPERAZINE 25 MG
12.5 SUPPOSITORY, RECTAL RECTAL EVERY 12 HOURS PRN
Status: DISCONTINUED | OUTPATIENT
Start: 2024-10-17 | End: 2024-10-23 | Stop reason: HOSPADM

## 2024-10-17 RX ORDER — LIDOCAINE 40 MG/G
CREAM TOPICAL
Status: DISCONTINUED | OUTPATIENT
Start: 2024-10-17 | End: 2024-10-23 | Stop reason: HOSPADM

## 2024-10-17 RX ORDER — OXYCODONE HYDROCHLORIDE 5 MG/1
5 TABLET ORAL EVERY 4 HOURS PRN
Status: DISCONTINUED | OUTPATIENT
Start: 2024-10-17 | End: 2024-10-23 | Stop reason: HOSPADM

## 2024-10-17 RX ORDER — ATORVASTATIN CALCIUM 40 MG/1
80 TABLET, FILM COATED ORAL EVERY EVENING
Status: DISCONTINUED | OUTPATIENT
Start: 2024-10-17 | End: 2024-10-23 | Stop reason: HOSPADM

## 2024-10-17 RX ORDER — PROCHLORPERAZINE MALEATE 5 MG/1
5 TABLET ORAL EVERY 6 HOURS PRN
Status: DISCONTINUED | OUTPATIENT
Start: 2024-10-17 | End: 2024-10-23 | Stop reason: HOSPADM

## 2024-10-17 RX ORDER — MULTIPLE VITAMINS W/ MINERALS TAB 9MG-400MCG
1 TAB ORAL DAILY
Status: ON HOLD
Start: 2024-10-18 | End: 2024-11-09

## 2024-10-17 RX ORDER — POLYETHYLENE GLYCOL 3350 17 G/17G
17 POWDER, FOR SOLUTION ORAL 2 TIMES DAILY PRN
Status: DISCONTINUED | OUTPATIENT
Start: 2024-10-17 | End: 2024-10-23 | Stop reason: HOSPADM

## 2024-10-17 RX ORDER — WARFARIN SODIUM 2.5 MG/1
2.5 TABLET ORAL
Status: CANCELLED | OUTPATIENT
Start: 2024-10-17

## 2024-10-17 RX ORDER — GABAPENTIN 100 MG/1
100 CAPSULE ORAL AT BEDTIME
Status: DISCONTINUED | OUTPATIENT
Start: 2024-10-17 | End: 2024-10-23 | Stop reason: HOSPADM

## 2024-10-17 RX ORDER — IOPAMIDOL 755 MG/ML
100 INJECTION, SOLUTION INTRAVASCULAR ONCE
Status: COMPLETED | OUTPATIENT
Start: 2024-10-17 | End: 2024-10-17

## 2024-10-17 RX ORDER — LOSARTAN POTASSIUM 50 MG/1
50 TABLET ORAL 2 TIMES DAILY
Status: DISCONTINUED | OUTPATIENT
Start: 2024-10-17 | End: 2024-10-18

## 2024-10-17 RX ORDER — HYDRALAZINE HYDROCHLORIDE 25 MG/1
25 TABLET, FILM COATED ORAL EVERY 8 HOURS SCHEDULED
Status: DISCONTINUED | OUTPATIENT
Start: 2024-10-17 | End: 2024-10-18

## 2024-10-17 RX ORDER — ONDANSETRON 4 MG/1
4 TABLET, ORALLY DISINTEGRATING ORAL EVERY 6 HOURS PRN
Status: DISCONTINUED | OUTPATIENT
Start: 2024-10-17 | End: 2024-10-23 | Stop reason: HOSPADM

## 2024-10-17 RX ADMIN — METHOCARBAMOL 750 MG: 750 TABLET ORAL at 21:15

## 2024-10-17 RX ADMIN — METHOCARBAMOL 750 MG: 750 TABLET ORAL at 08:46

## 2024-10-17 RX ADMIN — ESCITALOPRAM OXALATE 10 MG: 10 TABLET ORAL at 08:46

## 2024-10-17 RX ADMIN — METHOCARBAMOL 750 MG: 750 TABLET ORAL at 14:12

## 2024-10-17 RX ADMIN — ATORVASTATIN CALCIUM 80 MG: 40 TABLET, FILM COATED ORAL at 21:15

## 2024-10-17 RX ADMIN — LOSARTAN POTASSIUM 50 MG: 50 TABLET, FILM COATED ORAL at 21:15

## 2024-10-17 RX ADMIN — PANTOPRAZOLE SODIUM 40 MG: 40 TABLET, DELAYED RELEASE ORAL at 06:40

## 2024-10-17 RX ADMIN — Medication 10 MG: at 21:15

## 2024-10-17 RX ADMIN — EMPAGLIFLOZIN 10 MG: 10 TABLET, FILM COATED ORAL at 08:46

## 2024-10-17 RX ADMIN — AMIODARONE HYDROCHLORIDE 200 MG: 200 TABLET ORAL at 08:46

## 2024-10-17 RX ADMIN — LOSARTAN POTASSIUM 50 MG: 50 TABLET, FILM COATED ORAL at 08:46

## 2024-10-17 RX ADMIN — MAGNESIUM OXIDE TAB 400 MG (241.3 MG ELEMENTAL MG) 400 MG: 400 (241.3 MG) TAB at 08:46

## 2024-10-17 RX ADMIN — GABAPENTIN 100 MG: 100 CAPSULE ORAL at 21:14

## 2024-10-17 RX ADMIN — WARFARIN SODIUM 2.5 MG: 2.5 TABLET ORAL at 18:59

## 2024-10-17 RX ADMIN — IOPAMIDOL 90 ML: 755 INJECTION, SOLUTION INTRAVENOUS at 10:15

## 2024-10-17 RX ADMIN — Medication 1 TABLET: at 08:46

## 2024-10-17 RX ADMIN — SODIUM CHLORIDE 100 ML: 9 INJECTION, SOLUTION INTRAVENOUS at 10:15

## 2024-10-17 RX ADMIN — HYDRALAZINE HYDROCHLORIDE 25 MG: 25 TABLET ORAL at 18:59

## 2024-10-17 ASSESSMENT — ACTIVITIES OF DAILY LIVING (ADL)
ADLS_ACUITY_SCORE: 39
ADLS_ACUITY_SCORE: 33
ADLS_ACUITY_SCORE: 39
ADLS_ACUITY_SCORE: 33
ADLS_ACUITY_SCORE: 41
ADLS_ACUITY_SCORE: 33
ADLS_ACUITY_SCORE: 41
ADLS_ACUITY_SCORE: 33
TOILETING: 1-->ASSISTANCE (EQUIPMENT/PERSON) NEEDED (NOT DEVELOPMENTALLY APPROPRIATE)
ADLS_ACUITY_SCORE: 33
ADLS_ACUITY_SCORE: 33
ADLS_ACUITY_SCORE: 41
ADLS_ACUITY_SCORE: 41
ADLS_ACUITY_SCORE: 39
ADLS_ACUITY_SCORE: 39
ADLS_ACUITY_SCORE: 41
FALL_HISTORY_WITHIN_LAST_SIX_MONTHS: NO
ADLS_ACUITY_SCORE: 34
ADLS_ACUITY_SCORE: 39
TOILETING: 1-->ASSISTANCE (EQUIPMENT/PERSON) NEEDED
ADLS_ACUITY_SCORE: 34
CHANGE_IN_FUNCTIONAL_STATUS_SINCE_ONSET_OF_CURRENT_ILLNESS/INJURY: YES
ADLS_ACUITY_SCORE: 33
ADLS_ACUITY_SCORE: 34
ADLS_ACUITY_SCORE: 33

## 2024-10-17 NOTE — PLAN OF CARE
Goal Outcome Evaluation:      Plan of Care Reviewed With: patient    Overall Patient Progress: no change    Outcome Evaluation: Pt did not want to discuss medications at HS admin time. no change in skin integrity. using call light approp with no self transfers noted.    Completed bolus this shift and drank an additional 360 mL. MAP 76. LVAD WNL x2. Supervised pt changing from battery power to wall after nurse assisted with set up; no concerns noted. Denies dizziness/lightheadedness.

## 2024-10-17 NOTE — PROGRESS NOTES
ANTICOAGULATION  MANAGEMENT: Discharge Review    Duane C Johnson chart reviewed for anticoagulation continuity of care    Hospital Admission on 10/5-10/17/24 for rehab after LVAD placement.    Discharge disposition: [facility] will follow up once discharged from TCU    Results:    Recent labs: (last 7 days)     10/11/24  0622 10/12/24  0540 10/13/24  0548 10/14/24  0617 10/15/24  0521 10/16/24  0551 10/17/24  0629   INR 2.38* 2.24* 2.33* 2.38* 2.41* 2.51* 2.67*     Anticoagulation inpatient management:     See calendar    Anticoagulation discharge instructions:     Warfarin dosin.5mg on 10/17 and TCU will manage   Bridging: No   INR goal change: No      Medication changes affecting anticoagulation: No    Additional factors affecting anticoagulation: Yes: Patient is recovering from LVAD placement surgery     PLAN     No adjustment to anticoagulation plan needed    [facility] will resume monitoring upon discharge from TCU    Anticoagulation Calendar updated    Namrata Walker RN  10/17/2024  Anticoagulation Clinic  [facility] for routing messages: susannah MARTINEZ LVAD  [facility] patient phone line: [phone]

## 2024-10-17 NOTE — PLAN OF CARE
"Physical Therapy Discharge Summary    Reason for therapy discharge:    Discharged to transitional care facility.    Progress towards therapy goal(s). See goals on Care Plan in [facility] electronic health record for goal details.  Goals partially met.  Barriers to achieving goals:   limited tolerance for therapy.    Therapy recommendation(s):    Continued therapy is recommended.  Rationale/Recommendations:  ongoing OP cardiac rehab following TCU. Anticipate pt will be Maria Esther with 4WW once symptoms of dizziness, high/low PI's, and pre-syncopal events are resolved medically  .    Precautions: fall, LVAD- monitor for low PI's      Current Status:  Bed Mobility: Maria Esther rolling, Sup<>sit SBA w/ bedrail   Transfer: SBA w/ 4WW   Gait: Up to 200' SBA w/ 4WW   Stairs: 6\" x4 w/ joel railings, SBA  Balance: Stable dynamic sitting, SBA for unsupported dynamic standing      Outcome Measures:   5TSTS (modified, light UE push off): 32 seconds on 10/9  6MWT: max amb distance 150' on 10/9.   "

## 2024-10-17 NOTE — CONSULTS
[facility]   Cardiology II Service / Advanced Heart Failure  ARU/TCU Consult Note      Patient: Duane C Johnson  MRN: [id number]  Admission Date: 8/30/2024  Hospital Day # 0    Assessment and Plan: Duane C Johnson is a 74 year old male pmhx of anterior STEMI s/p PCI to the pLAD on 10/26/23 with a VT/VF arrest the next day (10/27) with patent stents and unchanged anatomy on repeat angiogram. Placed on amiodarone and discharged 11/03/23, ICD was not indicated as this was within 48h of his MI. His EF prior to discharge was 20-30% with a large area of akinesis in the LAD, placed on apixaban due to this (Apixaban dcd on 7/5/24). Has had multiple admissions for HF exacerbation last year.  Admitted on 8/30/24 for CHF exacerbation with BNP 16k. CPX and RHC showed significant functional limitations due to heart failue. Underwent HM3 LVAD on 9/18/24. Discharged to ARU on 10/5/24.     Recommendations:  - Can hold jardiance given the diuretic affect (ordered for you)  - Recommended he drink at least 8 glasses of water per day (he has 16 ounce glasses at the bedside and drank 6 yesterday and got IVF with improvement to his low flow alarms)  - If nocturnal low-flow alarms continue to occur sporadically could increase the PM dose of hydralazine to 50 mg (and leave his day time doses 25 mg)  - If he has ongoing low flows or inability to participate in therapy, would need to consider a RHC  - Monitor for symptoms of pneumonia given the CT, currently none  - Would not adjust therapy for variable PIs, the variation with position changes is expected for him. Would only make adjustments for low flow and symptoms.     # Acute on chronic systolic heart failure secondary to ICM   # s/p HM3 LVAD as DT on 9/18/2024  # CAD s/p PCI  # History of STEMI  # s/p ICD 5/2024  # Acute Angle of Outflow Graft  Stage D. NYHA Class III.  Fluid status: euvolemic to mild hypovolemia   ACEi/ARB/ARNi: losartan 50 BID  Vasodilator: continue  hydralazine 25 mg q8h   BB: Recent LVAD, will defer to outpatient  Aldosterone antagonist deferred while other medical therapy is prioritized  SGLT2i: HOLD Empagliflozin 10 mg given hypovolemia  SCD prophylaxis: ICD  MAP: goal 65-85, current MAPs have been significantly improved and mostly are within goal  LDH trends: 223 on 10/14, stable, please monitor weekly   Anticoagulation: warfarin dosing per pharmacy, INR goal 2-3, today 2.67  Antiplatelet: ASA not indicated in LVAD population, > 6 months s/p STEMI    The patient's HeartMate LVAD was interrogated 10/17/2024  * Speed 5100 rpm   * Pulsatility index 4.2  * Power 3.3 Dahl   * Flow 4.7L/minute   Fluid status: hypovolemic    Alarms were reviewed, and notable for occasional pi events, 3 low flow alarms last night (consdierable improvement from the 8+ low flows the night before- unable to give specific number given limited available history). History goes back ~30 hours  The driveline exit site was inspected, c/d/i.   All external components were inspected and showed no evidence of damage or malfunction, none replaced.   No changes to VAD settings made    # Low flow alarms  # Elevated PIs  # Labile MAPs   # Suspected orthostatic hypotension    Speed optimization echo performed 10/1, speed decreased to 5100. Low flows seems releated to hypertension and some hypovolemia as well. Have improved with re-timing hydralazine ad after IV fluids. CT-A was done on  10/17/24 with no outflow graft ostruction. The outflow graft does make an acute angle as it approaches the aorta, discussed with Dr. Valentine, unlikely to be impacting the low flows at this time  - Losartan 50 mg BID  - Hydralazine 25 mg q8h  - Increase PO intake as above  - If ongoing issue would need to consider [facility]    # History of VT/VF arrest 2023  # AFib s/p DCCV 9/2024 and again 9/30/2024  - Successfully cardioverted in early September for AF. Since then EKG suggested AF on 9/21. Tele is only available from as  "early as 9/22. The patient was started on hep gtt 9/21, but unclear if was in AF before this. While the patient was on hep gtt until INR was therapeutic, it is not possible to assess rhythm prior to 9/21. Systemic AC was off on 9/18-9/20. S/p MELBA and DCCV on 9/30 with conversion to sinus rhythm   - Continue amiodarone 200 mg daily   - Continue AC as above    # Visual changes, resolved.   # Generalized weakness  # Headache  Stoke code call 9/29 for visual changes - right eye with decreased upper half of vision and bluish haze. Resolved after 30 minutes. Seen by opthalmology and neuro. Negative head CT and CTA head and neck. He has had 3 episodes which last about 5 minutes before resolving completely.     On 10/9 Stroke code activated due to concern of generalized weakness, numbness and headache. LKW reported as 0715 when he worked with therapies and shortly after started feeling generally weak. Then around 1500 he had onset of moderate \"tension\" type headache and tingling in the bilateral fingertips. Per RRT URIAH patient now reports tingling has resolved and headache is improving. MAP 48, INR 2.2. Given absence of focal deficits and rapidly improving symptoms, opted to cancel stroke.  CT head negative, though did show chronic maxillary sinusitis.   - Ophthalmology and Neuro, signed off  - Already on A/C  - Continue to monitor for changes    # Hypovolemia hyponatremia, resolved  Na 128, ranging 128-133. Improved to 135 after IVF  - encourage PO intake  - daily BMP  - hold empagliflozin 10 mg daily     # Anemia  - Hgb 8.4 10/4. No overt bleeds, slow downtrend since surgery. LDH downtrending.   - Iron studies suggestive of RADHA. Started on IV venofer x5 days    Time/Communication  I spent 65 minutes reviewing results, care team notes, meeting directly with the patient, coordinating care, and completing documentation on the day of service.     Torrie Forrest Pa-C  Advanced Heart Failure/Cardiology II Service  Vocera " preferred or Pager [phone]  ================================================================    Subjective/24-Hr Events:   He reports less low- flow alarms yesterday. Had 3 overnight. No symptomatic. He notes that his PI was elevated about 10 during those alarms. He feels he worked well with therapy yesterday without dizziness and walked back and forth in the hallway. He says he has been dizzy twice with standing up but that this has gotten better. No LE edema. No abdominal edema. No coughing. No fevers or chills. He does still get DIOP with a lot of activity. No blood in the urine. No blood in the stool. No prolonged nosebleeds. No headaches. No driveline concerns. Eating okay, but doesn't have a strong appetite.     ROS:  4 point ROS including respiratory, CV, GI and  (other than that noted in the HPI) is negative.     Medications: Reviewed in EPIC.     Physical Exam:   Pulse 74   Temp 97.6  F (36.4  C) (Oral)   Resp 16   Wt 63.4 kg (139 lb 12.4 oz)   SpO2 98%   BMI 22.56 kg/m      GENERAL: Appears comfortable, in no distress.    HEENT: Eye symmetrical, no discharge or icterus bilaterally.  NECK: Supple, JVD < 6 cm at 90 degrees   CV: + LVAD hum.   RESPIRATORY: Respirations regular, even, slightly labored. Lungs CTA throughout.    GI: Soft and non distended with normoactive bowel sounds   EXTREMITIES: No peripheral edema. All extremities are warm and well perfused. Thready peripheral pulses present.   NEUROLOGIC: Alert and interacting appropriately.   SKIN: No jaundice. No rashes or lesions. Sternum c/d/I. Driveline dressing c/d/i    Labs:  CMP  Recent Labs   Lab 10/17/24  0629 10/14/24  0617 10/13/24  0548 10/12/24  0540    131* 131* 133*   POTASSIUM 4.7 4.3 4.3 4.5   CHLORIDE 102 97* 98 100   CO2 28 25 24 26   ANIONGAP 5* 9 9 7   GLC 83 101* 99 84   BUN 25.8* 19.1 20.3 19.6   CR 0.84 0.53* 0.68 0.73   GFRESTIMATED >90 >90 >90 >90   PRANAV 8.5* 8.6* 8.5* 8.6*       CBC  Recent Labs   Lab  10/17/24  0629 10/14/24  0617 10/13/24  0548   WBC 7.1 6.1 5.9   RBC 3.26* 3.21* 3.01*   HGB 10.3* 10.0* 9.5*   HCT 31.3* 30.9* 29.0*   MCV 96 96 96   MCH 31.6 31.2 31.6   MCHC 32.9 32.4 32.8   RDW 16.9* 16.9* 17.2*    189 212       INR  Recent Labs   Lab 10/17/24  0629 10/16/24  0551 10/15/24  0521 10/14/24  0617   INR 2.67* 2.51* 2.41* 2.38*

## 2024-10-17 NOTE — PROGRESS NOTES
Occupational Therapy Discharge Summary    Reason for therapy discharge:    Discharged to transitional care facility.    Progress towards therapy goal(s). See goals on Care Plan in [facility] electronic health record for goal details.  Goals partially met.  Barriers to achieving goals:   limited tolerance for therapy and discharge from facility.    Therapy recommendation(s):    Continued therapy is recommended.  Rationale/Recommendations:  Recommend continued OT services to increase IND and safety with ADLs/IADLs and functional mobility.      Precautions: falls, sternal, cardiac, LVAD, short term memory deficits     Current Status:  ADLs:  Mobility: S with 4WW if on wall power, nearing mod IND if on battery power  Grooming: IND standing at sink  Dressing: UB - IND LB - IND with 4WW  Bathing: Mod I with FB spongebath seated at EOB  Toileting: Supervision with 4WW  IADLs: Supportive spouse who can assist, recommend spouse to assist with IADLs d/t cognitive deficits.   Vision/Cognition: Pt scored 17/30 on SLUMs in 10/3/24. ACE-III on 10/8/24: 74/100 indicating cognitive deficits     Unable to progress pt to mod IND with 4WW while on ARU d/t dizziness/lightheadedness, syncopal episodes, and labile PI's.

## 2024-10-17 NOTE — PROGRESS NOTES
Speech Language Therapy Discharge Summary    Reason for therapy discharge:    Discharged to transitional care facility.    Progress towards therapy goal(s). See goals on Care Plan in [facility] electronic health record for goal details.  Goals not met.  Barriers to achieving goals:   chronic memory deficits, limited buy-in from pt for cognitive interventions.    Therapy recommendation(s):    No further therapy is recommended. [facility]    Status 10/18:  Hearing: Increased volume  Vision: Readers  Communication: Motor speech, language intact  Cognition: Mild cognitive impairment- severe short term memory deficits- worsening for approx 5 years  Swallow: Easy to chew solids (7)/Thin liquids (0). Pt reported due to lack of dentition, only wants to eat chicken noodle soup and mashed potatoes every meal. Clinical swallow evaluation not indicated at this time, MD can manage diet texture as it is related to dentition status only.     Barriers to Discharge (Family Training, etc): None- wife trained LVAD, already managed pt's meds prior, is  per pt and can manage finances.

## 2024-10-17 NOTE — H&P
[facility] Transitional Care    History and Physical - Hospitalist Service       Date of Admission:  10/17/24    Assessment & Plan      Duane C Johnson is a 74 year old male pmhx of anterior STEMI s/p PCI to the pLAD on 10/26/23 with a VT/VF arrest the next day (10/27) Placed on amiodarone and discharged 11/03/23, ICD was not indicated as this was within 48h of his MI. His EF prior to discharge was 20-30% with a large area of akinesis in the LAD, placed on apixaban due to this (Apixaban dcd on 7/5/24). Had multiple admissions for HF exacerbation, admitted on 8/30/24 for CHF exacerbation with BNP 16k. CPX and RHC showed significant functional limitations due to heart failure. Underwent HM3 LVAD placement on 9/18/24 and required mediastinal re-exploration for post operative hemorrhage.   Transferred to ARU on 10/5 for rehab. Unable to progress with therapies and transitioned to TCU on 10/17.    #Acute on chronic systolic heart failure secondary to ICM   #HFrEF s/p HM3 LVAD as DT on 9/18/2024  #Low flow state and frequent PI events  #HTN  #CAD s/p PCI  #History of STEMI, CAD status post  PCI to LAD in 2023   #s/p ICD 5/8/2024 with history VT/VF arrest 2023   Stage D. NYHA Class III. SOB ongoing with therapy but not at rest. Variability in PI as noted in prior notes and above. Prior echo on 10/10, significantly decreased LVEF 15-20%, normal LVAD inflow and outflow cannula dopplers, bowing septum towards RV, moderately reduced global RV function. Has had limited therapy here d/t symptoms of dizziness when getting OOB. However, no current dizziness as of 10/15. Will monitor if pt can increase some PT/OT therapies with scheduled hydralazine, but if remains without progress may need to return hospital status. Course complicated by low flow state, unable to progress with therapies and therefore transferred to TCU on 10/17.  - goal MAP 65-85     - continue holding bumex, consider restarting if s/sxs of volume  overload  - continue losartan 50 mg bid  - continue hydralazine 25 mg q8h timed  - no BB  - Continue jardiance 10 mg daily  - continue SCDs  - warfarin per pharm, goal INR 2-3  - no ASA indicated in LVAD, > 6 months s/p STEMI     #Hyponatremia, mild  Na stable low 130s. Asymptomatic currently.  - monitor intermittently     #History of VT/VF arrest 2023  #AFib s/p DCCV 9/5/2024 and again 9/30/2024  Successfully cardioverted in early September for AF. Since then EKG suggested AF on 9/21. Tele is only available from as early as 9/22. The patient was started on hep gtt 9/21, but unclear if was in AF before this. While the patient was on hep gtt until INR was therapeutic, it is not possible to assess rhythm prior to 9/21. Systemic AC was off on 9/18-9/20. S/p MELBA and DCCV on 9/30 with conversion to sinus rhythm   - continue amiodarone 200 mg daily      #Visual changes, resolved  Stroke code call 9/29 for visual changes - right eye with decreased upper half of vision and bluish haze. Resolved after 30 minutes. Seen by opthalmology and neuro. Negative head CT and CTA head and neck. He has had 3 episodes which last about 5 minutes before resolving completely.   - Ophthalmology and Neuro now signed off     # Anemia  Hgb stable ~8. Iron studies suggestive of RADHA. Received IV venofer x5 days last day 10/2.  - f/u repeat labs 10/17     #Bilateral pleural effusions  Chest tube removed 9/30. S/p Left thoracentesis 10/1 with total 1 L removed, R thoracentesis completed 10/3 with additional 1 L removed.      #History localized prostate cancer, [place] 4+7=7   Was not felt to be surgical candidate d/t cardiac disease. Received  Lupron q 3 months and completed radiation therapy.  - f/u as outpatient when able        Diet:  Combination Diet Regular Diet; Easy to Chew (level 7); Thin Liquids (level 0)  Snacks/Supplements Adult: Ensure Enlive; With Meals  Snacks/Supplements Adult: Magic Cup; With Meals  Snacks/Supplements Adult: Other;  Cottage cheese + strawberry Greek yogurt TID with meals; With Meals    DVT Prophylaxis: Warfarin  Michael Catheter: Not present  Lines: None     Cardiac Monitoring: None  Code Status:  full code    Clinically Significant Risk Factors Present on Admission                # Coagulation Defect: INR = 2.67 (Ref range: 0.85 - 1.15) and/or PTT = 107 Seconds (Ref range: 22 - 38 Seconds), will monitor for bleeding     # End stage heart failure: Ventricular assist device (VAD) present   # Anemia: based on hgb <11           # Financial/Environmental Concerns:     # ICD device present       Disposition Plan     Medically Ready for Discharge: Anticipated in 5+ Days           Abiola Schilling MD  Hospitalist Service  [facility]  Securely message with StyleTread (more info)  Text page via [facility] Paging/Directory     ______________________________________________________________________    Chief Complaint   CHF   Physical deconditioning    History is obtained from the patient and electronic health record    History of Present Illness   Duane C Johnson is a 74 year old right hand dominant male with a PMH of HFrEF 2/2 ICM with LVEF 20-30%, atrial fibrillation, 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 Due to worsening functional limitations due to heart failure, patient was worked up for VAD while inpatient. Patient is now s/p implantation of HeartMate 3 LVAD on 9/18/2024. His post-operative course was complicated by Afib, pain, anxiety, insomnia, and transient right eye visual deficit. He has also required medical mgmt of his acute blood loss anemia, fluid overload, hyperglycemia, hyponatremia and B pleural effusions. He was admitted to rehab on 10/05/2024 in setting of impaired strength, impaired balance and impaired activity tolerance following his LVAD placement.     Despite ongoing work with PT/OT/SLP at  ARU, pt had very limited participation in s/o presyncope and orthostasis in s/o low flow states 2/2 CHF and LVAD.     Will need close monitoring and cardiology f/u for ongoing recs.      Past Medical History    Past Medical History:   Diagnosis Date    Benign essential hypertension     CAD (coronary artery disease)     Chronic systolic heart failure (H)     Hypertension     ST elevation myocardial infarction (STEMI), unspecified artery (H)     Ventricular fibrillation (H)        Past Surgical History   Past Surgical History:   Procedure Laterality Date    ANESTHESIA CARDIOVERSION N/A 9/5/2024    Procedure: Anesthesia cardioversion;  Surgeon: GENERIC ANESTHESIA PROVIDER;  Location: [facility] OR    ANESTHESIA CARDIOVERSION N/A 9/30/2024    Procedure: Anesthesia cardioversion;  Surgeon: GENERIC ANESTHESIA PROVIDER;  Location: [facility] OR    COLONOSCOPY N/A 3/23/2023    Procedure: COLONOSCOPY, FLEXIBLE, WITH LESION REMOVAL USING SNARE;  Surgeon: Jose Faustin MD;  Location: [facility]    CV CENTRAL VENOUS CATHETER PLACEMENT N/A 10/26/2023    Procedure: Central Venous Catheter Placement;  Surgeon: Rob Lyles MD;  Location: [facility] CARDIAC CATH LAB    CV CORONARY ANGIOGRAM N/A 10/27/2023    Procedure: Coronary Angiogram;  Surgeon: Rob Lyles MD;  Location: [facility] CARDIAC CATH LAB    CV CORONARY ANGIOGRAM N/A 10/26/2023    Procedure: Coronary Angiogram;  Surgeon: Rob Lyles MD;  Location: [facility] CARDIAC CATH LAB    CV LEFT HEART CATH N/A 10/26/2023    Procedure: Left Heart Catheterization;  Surgeon: Rob Lyles MD;  Location: [facility] CARDIAC CATH LAB    CV PCI N/A 10/27/2023    Procedure: Percutaneous Coronary Intervention;  Surgeon: Rob Lyles MD;  Location: [facility] CARDIAC CATH LAB    CV PCI STENT DRUG ELUTING N/A 10/26/2023    Procedure: Percutaneous Coronary Intervention Stent;  Surgeon: Rob Lyles MD;  Location: [facility] CARDIAC CATH LAB    CV RIGHT  HEART CATH MEASUREMENTS RECORDED N/A 2024    Procedure: Heart Cath Right Heart Cath;  Surgeon: Rob Lyles MD;  Location:  HEART CARDIAC CATH LAB    CV RIGHT HEART CATH MEASUREMENTS RECORDED N/A 9/3/2024    Procedure: Right Heart Catheterization;  Surgeon: Aaron Mesa MD;  Location:  HEART CARDIAC CATH LAB    EP ICD INSERT SINGLE N/A 2024    Procedure: Implantable Cardioverter Defibrillator Device & Lead Implant Dual;  Surgeon: Guero Black MD;  Location:  HEART CARDIAC CATH LAB    INJECTION, HYDROGEL SPACER N/A 2024    Procedure: INJECTION, HYDROGEL SPACER AND FIDUCIAL MARKER PLACEMENT;  Surgeon: Stanislaw Barros MD;  Location:  OR    INSERT VENTRICULAR ASSIST DEVICE LEFT (HEARTMATE II) N/A 2024    Procedure: Median Sternotomy, INSERTION of LEFT VENTRICULAR ASSIST DEVICE (HEARTMATE III), cardiopulmonary bypass, transesophageal echocardiogram performed by anesthesia.;  Surgeon: Mulvihill, Michael, MD;  Location: [facility] OR    IRRIGATION AND DEBRIDEMENT CHEST WASHOUT, COMBINED Right 2024    Procedure: Exploration of chest, chest washout;  Surgeon: Mulvihill, Michael, MD;  Location: [facility] OR       Prior to Admission Medications   Cannot display prior to admission medications because the patient has not been admitted in this contact.        Review of Systems    The 10 point Review of Systems is negative other than noted in the HPI or here.     Social History   I have reviewed this patient's social history and updated it with pertinent information if needed.  Social History     Tobacco Use    Smoking status: Former     Current packs/day: 0.00     Average packs/day: 0.3 packs/day for 30.0 years (7.5 ttl pk-yrs)     Types: Cigarettes     Start date: 10/26/1993     Quit date: 10/26/2023     Years since quittin.9     Passive exposure: Past    Smokeless tobacco: Never    Tobacco comments:     Quit 10/26/2023   Vaping Use    Vaping status: Never Used   Substance Use  Topics    Alcohol use: Yes     Comment: 1-2 beers per day    Drug use: No         Family History   I have reviewed this patient's family history and updated it with pertinent information if needed.  Family History   Problem Relation Age of Onset    Other Cancer Mother     Obesity Mother     GI problems Father     Uterine Cancer Sister          Allergies   Allergies   Allergen Reactions    Brilinta [Ticagrelor] Other (See Comments) and Difficulty breathing     Per pt and spouse, hyperventilation.    Clonazepam Other (See Comments)     Per spouse, acted like a zombie and he was shaky, could barely talk.        Physical Exam   Vital Signs:                    Weight: 0 lbs 0 oz    Evaluated in ARU prior to transfer to TCU    General: NAD, sitting side of bed, no lightheadedness/dizziness currently  HEENT: EOMI, MMM   Resp: CTAB, no w/r/r, no increased WOB  CV: LVAD humming, no BLE edema  GI: soft, non-tender, non-distended  Skin: no rash, no lesions  Neuro: AOx3, no focal neuro deficits       Medical Decision Making       60 MINUTES SPENT BY ME on the date of service doing chart review, history, exam, documentation & further activities per the note.      Data   ------------------------- PAST 24 HR DATA REVIEWED -----------------------------------------------    I have personally reviewed the following data over the past 24 hrs:    7.1  \   10.3 (L)   / 200     135 102 25.8 (H) /  83   4.7 28 0.84 \     INR:  2.67 (H) PTT:  N/A   D-dimer:  N/A Fibrinogen:  N/A       Imaging results reviewed over the past 24 hrs:   No results found for this or any previous visit (from the past 24 hour(s)).

## 2024-10-17 NOTE — PLAN OF CARE
"Patient is a 74 year old male  admitted to room 411 via wheel chair.  Patient is alert and oriented X 4. See Epic for VS and assessment.  Patient is able to transfer A/1  using walker. Patient was settled into their room, shown call light, tv, mealtimes etc. Oriented to unit. Will continue monitoring pain level and VS. Notifying MD with any concerns.  Follow MD orders for cares and medications.    Flu Vaccine:   - Yes, up to date (patient had vaccine this flu season)      COVID Vaccine:   - Have not had COVID vaccine this season and would like; please add this on sticky for provider to order       Pneumococcal Vaccine:   - Yes, up to date       Ethnicity:   Race: White  Dentures: No no teeth  Hearing Aid: No  Smoker:  No  Glasses: No  Falls 0-1 mo: 0 2-6 mo: 0  Prior device use: Other per pt does not use     [facility] Care Directive Referral to Social Work?Yes    10/18/2024 Addendum by Adriana Moulton:   Flu: last administered 9/27/2024 - current   COVID: on sticky for provider to review and order, if appropriate   Pneumococcal: rcvd 20 3/13/2023. Per CDC, \" Their pneumococcal vaccinations are complete.\"    10/23/2024 Addendum by Adriana Moulton:   COVID: per provider, ordering COVID vaccine for 10/23/24    "

## 2024-10-17 NOTE — PLAN OF CARE
"Goal Outcome Evaluation:      Plan of Care Reviewed With: patient    Overall Patient Progress: improvingOverall Patient Progress: improving      VS: /87 (BP Location: Right arm)   Pulse 77   Temp 98.1  F (36.7  C) (Oral)   Resp 16   Ht 1.676 m (5' 6\")   Wt 63.1 kg (139 lb 1.8 oz)   SpO2 100%   BMI 22.45 kg/m          O2: SpO2 > 100 and stable on RA. Denies chest pain and SOB.    Output: Voids spontaneously without difficulty to bathroom / using beside urinal    Last BM: 10/11  denies abdominal discomfort. BS active / passing flatus.    Activity: Up with A1 walker and gait belt.    Skin: WDL except, LVAD    Pain: Denied pain    CMS: Intact, AOx4.    Dressing:     Diet: Easy to chew  diet. Denies nausea/vomiting.       LDA: L PIV SL    Equipment: IV pole, personal belongings,    Plan:  Continue with plan of care. Call light within reach, pt able to make needs known.    Additional Info: MAP=80. All other LVAD numbers WDL. Pt transferred to TCU this morning. No concerns.                 "

## 2024-10-17 NOTE — PLAN OF CARE
"Goal Outcome Evaluation:      Plan of Care Reviewed With: patient    Overall Patient Progress: improvingOverall Patient Progress: improving    VS: /87 (BP Location: Right arm)   Pulse 77   Temp 98.1  F (36.7  C) (Oral)   Resp 16   Ht 1.676 m (5' 6\")   Wt 59.8 kg (131 lb 13.4 oz)   SpO2 100%   BMI 21.28 kg/m       O2: 100% ON RA   Output: Voids spontaneously without difficulty to bathroom. Using beside urinal.   Last BM: 10/16.    Activity: Up with A x 1 with a walker   Skin: WDL except, Drive line incision   Pain: Denies pain   CMS: Intact, Alert and oriented. Denies numbness and tingling.   Dressing: CDI   Diet: Regular diet. Thin liquids takes pills whole. Denies nausea/vomiting.    LDA: L PIV, and Drive line in place.   Equipment: IV pole, and LVAD machine.    Plan: Precautions maintained. Continue with plan of care. Call light within reach, bed alarm on. Pt able to make needs known. Uses a call light appropriately.   Additional Info: MAP 78 over night   LVAD went off x2 overnight with highest PI 9.5           "

## 2024-10-17 NOTE — PROGRESS NOTES
"Pt transferring to  TCU later this morning. STACIA met with pt to complete IRF and IMM. Pt declined to sign IMM. Update provided to LVAD Jessi PALOMO.     [id number]     IRF-[facility] Pain Assessment    Pain Effect on Sleep  \"Over the past 5 days, how much of the time has pain made it hard for you to sleep at night?\"    0. Does not apply - I have not had any pain or hurting in the past 5 days    DMITRY Salinas  Post Acute Float   ARU/TCU/S[facility]ADOR    Phone: [phone]  Fax: [phone]    "

## 2024-10-17 NOTE — PLAN OF CARE
Care Coordination:    Upon transfer to TCU, patient's spouse has completed all required LVAD education. Coumadin education completed on 10/8. Patient's home care agency updated of transfer to TCU and will continue to follow patient's plan of care.     [facility]  [address]   [place] [address]  PH: [phone]    Writer received Handicap parking application from PM&R and placed in patient's hard chart. Notified Charge RN. Writer called Melissa, patient's spouse, and alerted her that patient was transferred successfully to the 4th floor and alerted her of room number. She is aware that parking application is in the chart and she needs to go to the  to request it.    Patient transferred to TCU from ARU on this date. Care Coordination assessment completed on ARU on 10/8. Copying assessment below:     TCU/ ARU care coordination assessment:     Reason for consult: Complex care coordination due to new LVAD status     Assessment completed with:  Patient      Date Admitted to unit: 10/5/24     BACKGROUND     Admitting Dx: Heartmate 3 LVAD Placement     PCP:   Jose Coles [phone] [address] [place] [id number]         Additional Care Team: Franchesca Haddad, LVAD Coordinator     Insurance: [employer]     Living arrangements:       Patient has three story home with basement, first, and second floors. Patient has a bathroom in the basement and first floor. Patient states that he has walk in showers on both floors with a built in seat.      Patient reports that he has stair lifts to access all floors of his home.       Caregiver after discharge (yes or no): Yes     Name/ Relationship:  Melissa, Spouse     Are they trainable for cares?  Yes     Previous Community Services: Patient reports that in the past, after he experienced an MI, he did have a nurse come to his home 1-2 times per week. He is unable to remember what agency this nurse was from.     "  Previous DME/Supplies: Patient has some grab bars installed in his home, but states that he needs some more installed. Patient's friend is a contractor and he states that those will be placed by him.      CURRENT STATUS:     Functional Status/ Transfers: Patient ambulating with nursing staff with a walker.      Tubes/Lines/Drains: LVAD     Skin/ Wounds: No current skin concerns     Medications that require education or coordinations: Coumadin- patient states that he has some baseline education regarding Coumadin, as his spouse takes this medication. He verbalizes that he is aware that he requires INRs to be obtained and has identified that he can have his INRs obtained at the [facility].      Follow ups:   PCP  Ophthalmology  PM&R   Outpatient therapies     Barriers to discharge: Patient states that he feels as though his tentative discharge date of 10/12/24 feels \"too soon\" and \"rushed.\" Patient states that he still feels very weak and his spouse does not believe that he is able to be home independently by that date.      DISCHARGE PLANNING:     Anticipated discharge date: 10/12/24      Discharge Location: Home      Services:  Home PT/OT/RN     DME/ Supplies:  Walker, TBD     Education needs:   Coumadin/LVAD, and patient's spouse will be checked off on LVAD dressing changes on 10/10. Spouse has completed all other LVAD education.      Other notes:    Writer met with patient this afternoon to introduce role of care coordination and discuss discharge planning. Patient stated instantly that he and his spouse both believe that Saturday, 10/12, feels rushed. Patient described his home and feels as though some modifications still need to be worked through, including placement of more grab bars, purchasing more walkers, and problem solving the steps from the garage into the home. Writer encouraged patient to discuss these concerns with therapy. Patient also stating that he would like to drive his vehicles and " also described some large machinery that he uses at home. Writer encouraged patient to discuss this with OT. Patient states that his spouse works from home full time. Writer explained home care versus outpatient and the role of care coordination within that. Prior to discharge, LVAD education and Coumadin education needs to be completed. Writer called patient's spouse to discuss all information above. Patient's spouse has a lot of concerns regarding preparation for discharge. She mentioned that the LVAD coordinator asked if she had a caregiver folder, which she does not. She is also concerned about the plan for showering, as she received a shower kit and does not have any instructions or has not been instructed on it. She is concerned about available outlets in her home and where to plug everything in. She states that she has his extra batteries charging currently. She would like a handicap parking application to be filled out prior to discharge. She also stated that she does not have any grab bars in place and writer encouraged her to bring in pictures of her bathroom to show therapy so they can recommend placement. She states that they did request for the contractor to place some, but she has not heard back as of yet. She would like to discuss patient's dietary recommendations with the Dietician, so writer will place consult. She would also like to speak with someone from therapy and writer will notify IDT. Melissa states that she currently does not feel ready to have patient home and is nervous about bringing him home. Writer sent patient's spouse concerns to LVAD Coordinator to follow up as well. Writer will continue to follow for discharge planning.     Regarding Follow Up concerns listed above:     On 10/9, LVAD coordinator did follow up with spouse:    - Showering- patient is unable to shower until instructed by VAD team in the outpatient clinic, we will teach how to shower safely, how to use shower bag.  Discussed until then patient is able to do sponge baths at home.     - Caregiver packet- discussed that this was a condensed version of the patient's binder which she has reviewed, it all the information that she has that she used to pass the written vad test yesterday. Vad coordinator will bring her a hard copy tomorrow.     -Outlets- She verified she has 3 prong outlets in their room- 4 to be exact, but they are currently using 3 ( 2 for her needs, 1 for his defibrillator) advised her to call device clinic to see if they are able to move defibrillator receptor out of their room, if unable feel free to call me back to discuss. Reviewed generator recommendations from [employer] (maker of heartmate3.)     Provided device clinic phone number, told her to call back with any further questions/ concerns.     Dietary also met with patient on 10/9 and 10/16.   OT and provider discussed with patient that he should not be using or driving any heavy machinery.   Patient currently on sternal precautions and should not be utilizing grab bars, and therapy was not recommending them.      Tamiko Pantoja RN, BSN, CRRN  ARC Patient Care Supervisor  Acute Rehabilitation Unit  PH: [phone]  Pager: [phone]

## 2024-10-17 NOTE — PROGRESS NOTES
VAD Coordinator on call paged by TCU RN reporting several low flow alarms. RN has not paged provider yet.   Instructed RN to contact provider. Often, low flow alarms are due to hypovolemia or hypertension. Provider should be notified to give orders for intervention.   RN verbalized understanding.

## 2024-10-17 NOTE — CONSULTS
Social Work: Initial Assessment with Discharge Plan    Patient Name: Duane C Johnson  : 1950  Age: 74 year old  MRN: [id number]  Completed assessment with: Chart review and patient interview  Admitted to TCU: 10/17/2024    Presenting Information   Date of SW assessment: 2024  Health Care Directive: Provided education  Primary Health Care Agent: Self  Secondary Health Care Agent: NOK, wife  Living Situation: Lives with wife and 2 cats at home. [address]. Farm  Previous Functional Status: Pt. was independent. [place], he does the primary cooking and yardwork. , wife assists with laundry, shopping, med management and finances.   DME available: grab bar, toilet;grab bar, tub/shower  Patient and family understanding of hospitalization: Appropriate and pleasant.  Cultural/Language/Spiritual Considerations: 74 year old male, , English speaking.   Abuse concerns: None reported  -----------------------------------------------------------  BIMS: Pt scored 14 on BIMS indicating Cognitively intact  PHQ-9: Pt scored 0 on PHQ-9 indicating No depression  PAS: confirmation number- [id number]   Has there been a level II screen?  No  Were there any recommendations in the screen? N/A  If yes, will the recommendations we incorporated into the Plan of Care?  N/A  Physical Health  Reason for admission: Duane C Johnson is a 74 year old male pmhx of anterior STEMI s/p PCI to the pLAD on 10/26/23 with a VT/VF arrest the next day (10/27) Placed on amiodarone and discharged 23, ICD was not indicated as this was within 48h of his MI. His EF prior to discharge was 20-30% with a large area of akinesis in the LAD, placed on apixaban due to this (Apixaban dcd on 24). Had multiple admissions for HF exacerbation, admitted on 24 for CHF exacerbation with BNP 16k. CPX and RHC showed significant functional limitations due to heart failure. Underwent HM3 LVAD placement on 24 and required  mediastinal re-exploration for post operative hemorrhage.   Transferred to ARU on 10/5 for rehab. Unable to progress with therapies and transitioned to TCU on 10/17.    Provider Information   Primary Care Physician:Jose Coles [facility]  : Jessi Almendarez, MSW, LGSW, APSW  [facility] Transplant/[facility]   Ph. [phone]     Mental Health:   Diagnosis: Pt denies any concerns with anxiety or depression  Current Support/Services: Pt denies  Previous Services: None reported.  Services Needed/Recommended: Pt was offered Anu and Health Psychology services while at TCU.    Substance Use:  Diagnosis: Hx of tobacco use, quit smoking 10 months ago  Current Support/Services: Pt denies  Previous Services: None reported  Services Needed/Recommended: Pt was offered Anu and Health Psychology services while at [facility].    Support System  Marital Status: , wife Melissa  Family support: Reports extended family   Other support available: None reported  Gaps in support system: Limited support outside wife    Personalized Care and Trauma  What other information would help us give you more personalized care or how can we help you with any past trauma? No trauma needs reported    Ion Linac Systemshart:  Do you have access to orangutrans to view my Baseline Care Plan? YES  If not, then SW will print out and provide Baseline Care Plan.     Community Resources  Current in home services: Accepted by [facility] PT/OT/RN  Previous services: None reported    Financial/Employment/Education  Employment Status: Retired  Income Source: SSI and pension  Education: High school  Financial Concerns:  None reported  Insurance: Medicare / [employer] Medicare Supplement    Discharge Plan   Patient and family discharge goal: Home with home care and support from wife  Provided Education on discharge plan: YES  Patient agreeable to discharge plan:  YES  A list of Medicare Certified Facilities was provided to the  patient and/or family to encourage patient choice. Based on location and rating, patient would like referrals made to: YES  General information regarding anticipated insurance coverage and possible out of pocket cost was discussed. Patient and patient's family are aware patient may incur the cost of transportation to the facility, pending insurance payment: YES  Barriers to discharge: TBD    Discharge Recommendations   Disposition: Home with home care and support from wife  Transportation Needs: Wife can provide  Name of Transportation Company and Phone: N/A    Additional comments   This writer was following pt while he was at  ARU. Awaiting medical readiness for discharge. Home care with [facility] set up for when pt discharges.    DMITRY Salinas  Post Acute Float   ARU/TCU/LTACH    Phone: [phone]  Fax: [phone]

## 2024-10-17 NOTE — PHARMACY-ADMISSION MEDICATION HISTORY
Admission medication history completed at [facility]. Please see Pharmacy Intern Admission Medication History note from 9/6/2024.

## 2024-10-17 NOTE — PLAN OF CARE
Goal Outcome Evaluation:      Plan of Care Reviewed With: patient    Overall Patient Progress: improvingOverall Patient Progress: improving    Patient is alert and oriented x 4. Able to make needs known. Denied SOB, N/V, and chest pain LVAD, heartmate III. Denied pain or discomfort. Drive line dressing CDI. All LVAD parameters WDL. MAP was 82. A/1 with walker and gait belt. Takes meds whole with thin liquids. Easy to chew( level 7) diet. Continent of B&B. Uses urinal at bed side. Scattered bruises on both arms. Abrasions on right shin, left arm. Mid sternal incision scabbed and ABD  incisions MELISSA. Applied mepelix at coccyx for protection. Pt wife at beside present. Ate with fair appetite. Call light with in reach. Continue with POC.      Patient's most recent vital signs are:     Vital signs:  BP: [MAP 82[  Temp: 98.7  HR: 76  RR: 16  SpO2: 98 %     Patient does not have new respiratory symptoms.  Patient does not have new sore throat.  Patient does not have a fever greater than 99.5.

## 2024-10-18 ENCOUNTER — APPOINTMENT (OUTPATIENT)
Dept: OCCUPATIONAL THERAPY | Facility: SKILLED NURSING FACILITY | Age: 74
DRG: 292 | End: 2024-10-18
Attending: STUDENT IN AN ORGANIZED HEALTH CARE EDUCATION/TRAINING PROGRAM
Payer: MEDICARE

## 2024-10-18 ENCOUNTER — PATIENT OUTREACH (OUTPATIENT)
Dept: CARE COORDINATION | Facility: CLINIC | Age: 74
End: 2024-10-18
Payer: MEDICARE

## 2024-10-18 ENCOUNTER — APPOINTMENT (OUTPATIENT)
Dept: PHYSICAL THERAPY | Facility: SKILLED NURSING FACILITY | Age: 74
DRG: 292 | End: 2024-10-18
Attending: STUDENT IN AN ORGANIZED HEALTH CARE EDUCATION/TRAINING PROGRAM
Payer: MEDICARE

## 2024-10-18 ENCOUNTER — APPOINTMENT (OUTPATIENT)
Dept: CARDIOLOGY | Facility: CLINIC | Age: 74
End: 2024-10-18
Attending: NURSE PRACTITIONER
Payer: MEDICARE

## 2024-10-18 LAB
HOLD SPECIMEN: NORMAL
HOLD SPECIMEN: NORMAL
INR PPP: 2.42 (ref 0.85–1.15)
LVEF ECHO: NORMAL

## 2024-10-18 PROCEDURE — 97535 SELF CARE MNGMENT TRAINING: CPT | Mod: GO

## 2024-10-18 PROCEDURE — 250N000013 HC RX MED GY IP 250 OP 250 PS 637: Performed by: INTERNAL MEDICINE

## 2024-10-18 PROCEDURE — 250N000013 HC RX MED GY IP 250 OP 250 PS 637: Performed by: STUDENT IN AN ORGANIZED HEALTH CARE EDUCATION/TRAINING PROGRAM

## 2024-10-18 PROCEDURE — 85610 PROTHROMBIN TIME: CPT | Performed by: STUDENT IN AN ORGANIZED HEALTH CARE EDUCATION/TRAINING PROGRAM

## 2024-10-18 PROCEDURE — 36415 COLL VENOUS BLD VENIPUNCTURE: CPT | Performed by: STUDENT IN AN ORGANIZED HEALTH CARE EDUCATION/TRAINING PROGRAM

## 2024-10-18 PROCEDURE — 93306 TTE W/DOPPLER COMPLETE: CPT | Mod: 26 | Performed by: INTERNAL MEDICINE

## 2024-10-18 PROCEDURE — 120N000009 HC R&B SNF

## 2024-10-18 PROCEDURE — 93306 TTE W/DOPPLER COMPLETE: CPT

## 2024-10-18 PROCEDURE — 97162 PT EVAL MOD COMPLEX 30 MIN: CPT | Mod: GP

## 2024-10-18 PROCEDURE — 250N000013 HC RX MED GY IP 250 OP 250 PS 637: Performed by: NURSE PRACTITIONER

## 2024-10-18 PROCEDURE — 97165 OT EVAL LOW COMPLEX 30 MIN: CPT | Mod: GO

## 2024-10-18 RX ORDER — WARFARIN SODIUM 2.5 MG/1
2.5 TABLET ORAL
Status: COMPLETED | OUTPATIENT
Start: 2024-10-18 | End: 2024-10-18

## 2024-10-18 RX ORDER — LOSARTAN POTASSIUM 50 MG/1
50 TABLET ORAL 2 TIMES DAILY
Status: DISCONTINUED | OUTPATIENT
Start: 2024-10-18 | End: 2024-10-23 | Stop reason: HOSPADM

## 2024-10-18 RX ORDER — HYDRALAZINE HYDROCHLORIDE 50 MG/1
50 TABLET, FILM COATED ORAL EVERY 8 HOURS SCHEDULED
Status: DISCONTINUED | OUTPATIENT
Start: 2024-10-18 | End: 2024-10-19

## 2024-10-18 RX ORDER — FLUDROCORTISONE ACETATE 0.1 MG/1
0.1 TABLET ORAL DAILY
Status: DISCONTINUED | OUTPATIENT
Start: 2024-10-18 | End: 2024-10-23 | Stop reason: HOSPADM

## 2024-10-18 RX ADMIN — METHOCARBAMOL 750 MG: 750 TABLET ORAL at 20:59

## 2024-10-18 RX ADMIN — HYDRALAZINE HYDROCHLORIDE 25 MG: 25 TABLET ORAL at 06:24

## 2024-10-18 RX ADMIN — AMIODARONE HYDROCHLORIDE 200 MG: 200 TABLET ORAL at 09:17

## 2024-10-18 RX ADMIN — HYDRALAZINE HYDROCHLORIDE 50 MG: 50 TABLET ORAL at 21:00

## 2024-10-18 RX ADMIN — MAGNESIUM OXIDE TAB 400 MG (241.3 MG ELEMENTAL MG) 400 MG: 400 (241.3 MG) TAB at 09:17

## 2024-10-18 RX ADMIN — Medication 10 MG: at 21:00

## 2024-10-18 RX ADMIN — GABAPENTIN 100 MG: 100 CAPSULE ORAL at 20:59

## 2024-10-18 RX ADMIN — METHOCARBAMOL 750 MG: 750 TABLET ORAL at 09:17

## 2024-10-18 RX ADMIN — ESCITALOPRAM OXALATE 10 MG: 10 TABLET ORAL at 09:17

## 2024-10-18 RX ADMIN — METHOCARBAMOL 750 MG: 750 TABLET ORAL at 13:33

## 2024-10-18 RX ADMIN — FLUDROCORTISONE ACETATE 0.1 MG: 0.1 TABLET ORAL at 14:46

## 2024-10-18 RX ADMIN — LOSARTAN POTASSIUM 50 MG: 50 TABLET, FILM COATED ORAL at 20:59

## 2024-10-18 RX ADMIN — WARFARIN SODIUM 2.5 MG: 2.5 TABLET ORAL at 18:06

## 2024-10-18 RX ADMIN — PANTOPRAZOLE SODIUM 40 MG: 40 TABLET, DELAYED RELEASE ORAL at 06:24

## 2024-10-18 RX ADMIN — Medication 1 TABLET: at 09:17

## 2024-10-18 RX ADMIN — HYDRALAZINE HYDROCHLORIDE 50 MG: 50 TABLET ORAL at 13:33

## 2024-10-18 RX ADMIN — ATORVASTATIN CALCIUM 80 MG: 40 TABLET, FILM COATED ORAL at 20:59

## 2024-10-18 RX ADMIN — LOSARTAN POTASSIUM 50 MG: 50 TABLET, FILM COATED ORAL at 09:17

## 2024-10-18 ASSESSMENT — ACTIVITIES OF DAILY LIVING (ADL)
ADLS_ACUITY_SCORE: 41
BADLS,_PREVIOUS_FUNCTIONAL_LEVEL: INDEPENDENT
ADLS_ACUITY_SCORE: 41
DEPENDENT_IADLS:: INDEPENDENT
ADLS_ACUITY_SCORE: 41
ADLS_ACUITY_SCORE: 41
BADLS,_PREVIOUS_FUNCTIONAL_LEVEL: INDEPENDENT
ADLS_ACUITY_SCORE: 41
IADLS,_PREVIOUS_FUNCTIONAL_LEVEL: INDEPENDENT
ADLS_ACUITY_SCORE: 41
ADLS_ACUITY_SCORE: 41

## 2024-10-18 NOTE — PLAN OF CARE
Goal Outcome Evaluation:      Plan of Care Reviewed With: patient    Overall Patient Progress: improvingOverall Patient Progress: improving     Pt LVAD started beeping @ around 0520pm. LVAD beeping every 2 to 3 minutes. Counted more than 28 times in one and a half hour. Pt noted to be asymptomatic. Flow noted to be below 2.4 when LVAD was beeping. MAP checked and noted to be elevated @ 110. LVAD coordinator paged and this nurse directed to page the cross cover. Cross cover paged cardiology and cardiology called the unit. Direction given and followed. MAP dropped to 96, thirty minutes after administration of hydralazine. MAP rechecked later and noted to be 87. Pt remain asymptomatic through out the shift.

## 2024-10-18 NOTE — PROGRESS NOTES
I was contacted on my pager re: beeping lvad, asking me to call nurse on the floor TCU regarding Mr. Aguila by 10/17 cross cover team.    - Immediately called the bedside nurse and was informed that the patient was asymptomatic and had high Pis. Team was unsure of what kind of alarms they were, so I provided education for how to look at the alarms, recommended a set of vitals. The RN confirmed that they had also called the vad coordinator on call, who recommended involving the cross-cover hospitalist. I asked that the nursing team send the cross cover provider my cell phone number via [employer] so that they could contact me as well to discuss the case further.    - Cross cover provider called  me to discuss case- I recommended initial patient evaluation- assess for symptoms, type of alarm and vitals and I could make further recommendation based on that information.    - I called RN back after initial assessment- MAP was 110 (had been 70s-80s during the day). Patient asymptomatic.Hydralazine had been off for about 24 hours. I recommended restarting hydralazine at 25 mg q8h. Confirmed that he was getting his losartan at 50 mg BID and had gotten both doses already for 10/17.    - I called back the cross cover provider and recommended resuming hydralazine at 25 mg every 8 hours. We also discussed some of the reasons for low flow alarms in 3 and that these pumps are particularly BP sensitive, so if the hypertension resolves I would expect the alarms to resolve. If the alarms do not resolve we would consider further east bank work-up including possible RHC. As I was not currently on call and did not expect to have access to my pager for the remainder of the evening, I recommended contacting the LVAD coordinator in the paging system as they can help triage things to the on call providers, or, if needing a miahkzpa-dr-btezkmlk discussion we disuseed how to find the Heart Failure Attending via [facility], currently Alonso Valentine  (Of note, unclear when the oncall HF attending timing will change, most accurate information will be via [facility] on-call list). If the alarms continued vad coordinator and HF attending should be contacted. I also assured cross cover team that I will discuss with LVAD leadership the current work-flow for contacting the VAD team and make sure that is clarified.     - After passing on recommendations to the cross cover team, I called back the bedside RN and reviewed things to watch for that would require escalation of the situation: low flow alarms not resolving with controlled map, altered mentation, low maps, headaches, shortness of breath, dizziness, or other new symptoms. We discussed that I would expect the alarms to resolve over the next hour as the bp comes down. The RN confirmed that the hydralazine had already been given a few minutes ago. Pt had 2 low flow alarms since then. They planned to check the MAP in 30 and then 60 minutes. I also recommended that the RN ask Mr. Aguila to drink an additional 16 oz of water over the next hour.     Additional time spent in the care of this patient: 40 minutes in addition to time documented earlier in the day.     Torrie Forrest PA-C

## 2024-10-18 NOTE — PROGRESS NOTES
I was contacted on my pager re: beeping lvad, asking me to call nurse on the floor TCU regarding Mr. Aguila by 10/17 cross cover team.    - Immediately called the bedside nurse and was informed that the patient was asymptomatic and had high Pis. Team was unsure of what kind of alarms they were, so I provided education for how to look at the alarms, recommended a set of vitals. The RN confirmed that they had also called the vad coordinator on call, who recommended involving the cross-cover hospitalist. I asked that the nursing team send the cross cover provider my cell phone number via [employer] so that they could contact me as well to discuss the case further.    - Cross cover provider called  me to discuss case- I recommended initial patient evaluation- assess for symptoms, type of alarm and vitals and I could make further recommendation based on that information.    - I called RN back after initial assessment- MAP was 110 (had been 70s-80s during the day). Patient asymptomatic. Reviewed medication list with the RN to see when he would next be due for hydralazine. Had not gotten hydralazine for 24 hours and no active order. I recommended restarting hydralazine at 25 mg q8h. Confirmed that he was getting his losartan at 50 mg BID and had gotten both doses already for 10/17.    - I called back the cross cover provider and recommended hydralazine 25 mg every 8 hours. We also discussed reasons for low flow alarms in 3 and that these pumps are particularly BP sensitive, so if the hypertension resolves I would expect the alarms to resolve. If the alarms do not resolve we would consider further east bank work-up including possible RHC. As I was not currently on call and did not expect to have access to my pager for the remainder of the evening, I recommended contacting the LVAD coordinator in the paging system as they can help triage things to the on call providers, or, if needing a zbbdzzvk-ri-[id number] discussion we  disuseed how to find the Heart Failure Attending via [facility], currently Alonso Valentine (Of note, [facility] will [facility], most accurate information will be via [facility] on-call list). If the alarms continued vad coordinator and HF attending should be contacted. I also assured cross cover team that I will discuss with LVAD leadership the current work-flow for contacting the VAD team and make sure that is clarified.     - After passing on recommendations to the cross cover team, I called back the bedside RN and reviewed things to watch for that would require escalation of the situation: low flow alarms not resolving with controlled map, altered mentation, low maps, headaches, shortness of breath, dizziness, or other new symptoms. We discussed that I would expect the alarms to resolve over the next hour as the bp comes down. The RN confirmed that the hydralazine had already been given a few minutes ago. Had had 2 low flow alarms since then. They planned to check the MAP in 30 and then 60 minutes. I also recommended that the RN ask Mr. Aguila to drink an additional 16 oz of water over the next hour.     Additional time spent in the care of this patient: 40 minutes in addition to time documented earlier in the day.     Torrie Forrest PA-C

## 2024-10-18 NOTE — PLAN OF CARE
Patient: Duane CPhilip Aguila      Tubes/Lines/Drains: LVAD - Heartmate III     Skin/ Wounds:      Medications:   Tylenol, amiodarone, lipitor, Dulcolax, jardiance, lexapro-AD, gabapentin, hydralazine, lidocaine, losartan, mag-ox, melatonin,robaxin, multivitamin, zofran, oxy, protonix, miralax, compazine, senna, warfarin  Additional Medical Information:      Follow up appointments:        Barriers to discharge: medical     DISCHARGE PLANNING:     Anticipated discharge date:       Discharge Location: Home with SO     Services:        Transportation:      DME/ Supplies:     Education needs:       Other notes:    10/18: rounded on patient to welcome him on unit. Patient expressed desire of wanting to go home as soon as able. Indicated that his wife has already passed VAD education and that they have all the equipment needed - chairlift set up. Informed patient that currently we don't have dates, providers are monitoring him due to fluctuation of PI, alarms - safer for patient to be on unit as these are being monitored. Additionally, therapy is still evaluating patient and waiting on dates from therapy perspective. Patient stated understanding and in agreement to connect next week and see how the weekend goes.

## 2024-10-18 NOTE — PROGRESS NOTES
"CLINICAL NUTRITION SERVICES - ASSESSMENT NOTE     Nutrition Prescription    RECOMMENDATIONS FOR MDs/PROVIDERS TO ORDER:  ***    Malnutrition Status:    ***    Recommendations already ordered by Registered Dietitian (RD):  ***    Future/Additional Recommendations:  ***  Monitor labs, intakes, and weight trends.     REASON FOR ASSESSMENT  Duane C Johnson is a/an 74 year old male assessed by the dietitian for Provider Order - ***    NUTRITION/MEDICAL HISTORY  History of  anterior STEMI s/p PCI to the pLAD on 10/26/23 with a VT/VF arrest the next day (10/27) Placed on amiodarone and discharged 11/03/23, ICD was not indicated as this was within 48h of his MI. His EF prior to discharge was 20-30% with a large area of akinesis in the LAD, placed on apixaban due to this (Apixaban dcd on 7/5/24). Had multiple admissions for HF exacerbation, admitted on 8/30/24 for CHF exacerbation with BNP 16k. CPX and RHC showed significant functional limitations due to heart failure. Underwent HM3 LVAD placement on 9/18/24 and required mediastinal re-exploration for post operative hemorrhage. Transferred to ARU on 10/5 for rehab. Unable to progress with therapies and transitioned to TCU on 10/17.    FINDINGS  ***    CURRENT NUTRITION ORDERS  Diet:  {erdietoptions:939283}  Snacks/supplements:  - Ensure Enlive (chocolate) TID with meals  - Magic Cup (chocolate) with lunch  - Cottage cheese + strawberry Greek yogurt TID with meals  - Additional snacks/supplements PRN  Nutrition Support: ***    Intake/Tolerance: ***-***% of documented meals. Poorly documented ***     LABS  Labs reviewed***    MEDICATIONS  Medications reviewed: Jardiance discontinued today, mag ox, Thera-vit-M, protronix, warfarin  IV Fluids over last 7 days? Y (500 mL on 10/12, 1,000 mL on 10/15 and 10/16)    ANTHROPOMETRICS  Height: 167.6 cm (5' 6\")  Most Recent Weight: 61 kg (134 lb 8 oz)    IBW: 64.5 kg (94% IBW)  BMI: Normal BMI - low for advanced age  Weight History: Pt " with limited weight history prior to admission,weight stable with fluctuations, likely fluid related, over last 6 months, 11 lb (7%) weight loss over 1 year. Pt reports UBW of ***.   10/18/24 61 kg (134 lb 8 oz)   10/17/24 63.1 kg (139 lb 1.8 oz)   10/05/24 66.2 kg (145 lb 14.4 oz)   09/17/24 64.9 kg (143 lb 1.3 oz)   08/08/24 64.3 kg (141 lb 12.8 oz)   07/05/24 59.9 kg (132 lb 1.6 oz)   06/26/24 63.3 kg (139 lb 9.6 oz)   06/11/24 62.9 kg (138 lb 9.6 oz)   05/22/24 61.5 kg (135 lb 9.6 oz)   05/20/24 60.1 kg (132 lb 9.6 oz)   05/15/24 63.2 kg (139 lb 6.4 oz)   05/13/24 60.2 kg (132 lb 11.2 oz)   05/10/24 60.9 kg (134 lb 3.2 oz)   05/08/24 61.7 kg (136 lb 0.4 oz)   05/06/24 61.9 kg (136 lb 7.4 oz)   04/22/24 60.6 kg (133 lb 11.2 oz)   04/16/24 62.1 kg (137 lb)   03/06/24 63.1 kg (139 lb 1.6 oz)   02/19/24 62 kg (136 lb 11.2 oz)   02/07/24 62.9 kg (138 lb 9.6 oz)   02/02/24 61.8 kg (136 lb 3.2 oz)   01/25/24 60 kg (132 lb 3.2 oz)   01/12/24 59.9 kg (132 lb 1.6 oz)   01/10/24 61 kg (134 lb 8 oz)   10/26/23 65.9 kg (145 lb 4.5 oz)      Dosing Weight: 61 kg - most recent weight    ASSESSED NUTRITION NEEDS  Estimated Energy Needs: 6348-2206+ kcals/day 25 - 30 kcal/kg  Justification: Maintenance vs increased needs  Estimated Protein Needs:  grams protein/day (1.5 - 2 grams of pro/kg)  Justification: increased needs post-op LVAD  Estimated Fluid Needs: 1 ml/kcal or per provider pending fluid status     PHYSICAL FINDINGS  See malnutrition section below.  { :327422} Surgical incisions  Dentition: {erdentition:107630}***    MALNUTRITION  % Intake: {erintake:008423}  % Weight Loss: None noted  Subcutaneous Fat Loss: {ERfatlossmalnutrition:802972}  Muscle Loss: {ERmusclelossmalnutrition:771014}  Fluid Accumulation/Edema: None noted  Malnutrition Diagnosis: ***    NUTRITION DIAGNOSIS  { :104055} related to *** as evidenced by ***      INTERVENTIONS  Implementation  { :895295}   { :227924}     Goals  Patient to consume  % of nutritionally adequate meal trays TID, or the equivalent with supplements/snacks.     Monitoring/Evaluation  Progress toward goals will be monitored and evaluated per protocol.    Nellie Thompson MS, RDN, LD  TCU/OB/Ortho Clinical Dietitian  Available via phone and Vocera  Phone: [phone]  Vocera: TCU Clinical Dietitian   Weekend/Holiday Vocera: Weekend Holiday Clinical Dietitian [Multi Site Groups]

## 2024-10-18 NOTE — PROGRESS NOTES
Reviewed notes from overnight - I increased hydralazine to 50 mg tid with hold parameters. Continue losartan with hold parameters. Ordered for you.     - IF recurrent alarms in s/o normal MAPs would consider readmission per cards for invasive hemodynamics with RHC  - overnight: call VAD coordinator for >3 low flow alarms within 1 hour  - backup plan would be readmission for RHC which would not happen until 10/21 earliest  - low dose florinef 0.1 mg daily started (ordered for you)  - stat echo  (ordered for you)  - VAD RN to order low flow controller (has higher threshold for alarms)  - encourage PO hydration (up to 8 16oz glasses of water per cards)      Jody Moreira, CHICO, ANP-C  Nurse Practitioner in Advanced Heart Failure/Cardiac Transplant/Cardiovascular Genetics

## 2024-10-18 NOTE — PROGRESS NOTES
LVAD alarms overnight. Appreciate cross-cover eval and care. Pt was seen by cards LVAD team today and orders placed.    Stable after losartan and hydralazine dose overnight without additional LVAD alarms.    On exam today, laying comfortably in bed. Denies SOB currently, no lightheadedness, no dizziness.      - hydralazine increased to 75 mg tid with holding parameters  - continue losartan with holding parameters  - appreciate evaluation by Dr. Valentine and Jody Moreira, DNP ANP-C  - IF recurrent alarms in s/o normal MAPs would consider readmission per cards for invasive hemodynamics with RHC  - overnight: call VAD coordinator for >3 low flow alarms within 1 hour  - backup plan would be readmission for RHC which would not happen until 10/21 earliest  - low dose florinef per cards  - stat echo per cards  - VAD RN to order low flow controller (has higher threshold for alarms)  - encourage PO hydration (up to 8 16oz glasses of water per cards)  - continue therapies as tolerated, do not adjust therapies for variable PIs but adjust for sxs or for low flow states

## 2024-10-18 NOTE — PLAN OF CARE
Goal Outcome Evaluation:      Plan of Care Reviewed With: patient    Overall Patient Progress: improvingOverall Patient Progress: improving     No episode of beeping after administration of scheduled hydralazin and losartan . LVAD parameters noted to be within defined limit. LVAD dressing changed per order. Pt drinking five 16oz of water. Appears to be  alert and oriented x 4. Able to make needs known.  Pt appetite good with about 90 percent of food shankar. No complain at this time. Will continue with POC

## 2024-10-18 NOTE — LETTER
To:             Please give to facility    From:   Ruthy Paiz RN, Care Coordinator   [facility]   Ruthy Paiz RN, Care Coordinator   [facility]   E-mail [email or web address]   [phone]   Patient Name:  Duane Johnson  YOB: 1950   Admit date: 10/17/2024      *Information Needed:  Please contact me when the patient will discharge (or if they will move to long term care)- include the discharge date, disposition, and main diagnosis   If the patient is discharged with home care services, please provide the name of the agency    Also- Please inform me if a care conference is being held.   [facility]   Ruthy Paiz RN, Care Coordinator   [facility]   E-mail [email or web address]   [phone]                             Thank you

## 2024-10-18 NOTE — PROGRESS NOTES
10/18/24 1000   Appointment Info   Signing Clinician's Name / Credentials (PT) Allison Peralta, PT, DPT   Rehab Comments (PT) Medical team aware of pt's variable PI, and recommending basing progress with mobility with therapies on pt symptoms and concern for low flow   Quick Adds   Quick Adds Certification   Living Environment   People in Home spouse   Current Living Arrangements house   Home Accessibility stairs to enter home;stairs within home   Number of Stairs, Main Entrance 4  (from garage)   Stair Railings, Main Entrance other (see comments);railings on both sides of stairs  (rails are flat boards)   Number of Stairs, Within Home, Primary greater than 10 stairs;other (see comments)  (17 steps, chair lift to bedroom 2nd level, also flight with chair lift to bathroom in basement)   Transportation Anticipated family or friend will provide  (theronaru outback ([place]))   Living Environment Comments PT: pt reports stair lift to basement for walk in shower and chair lift to second level for bedroom. He lives with his wife and their 2 cats. He was independent with mobility prior. He does cooking and yardwork and his spouse did laundry, shopping, med management, and finances. He has grab bar in shower and was not using DME prior   Self-Care   Usual Activity Tolerance moderate   Current Activity Tolerance poor   Equipment Currently Used at Home grab bar, tub/shower;other (see comments)  (stair lifts)   Fall history within last six months yes   Number of times patient has fallen within last six months   (does not list number, reports he trips on sticks, etc. at the famr and that he knows how to fall)   Previous Level of Function/Home Environm   BADLs, Previous Functional Level independent   Functional Cognition, Previous Functional Level Pt with known baseline cognitive deficits. 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 His EF prior to discharge was 20-30% with a large area of akinesis in the LAD, placed on apixaban due to this (Apixaban dcd on 7/5/24). Had multiple admissions for HF exacerbation, admitted on 8/30/24 for CHF exacerbation with BNP 16k. CPX and RHC showed significant functional limitations due to heart failure. Underwent HM3 LVAD placement on 9/18/24 and required mediastinal re-exploration for post operative hemorrhage.   Transferred to ARU on 10/5 for rehab. Unable to progress with therapies, now progressing to transitional care unit   Existing Precautions/Restrictions other (see comments)  (LVAD)   Heart Disease Risk Factors Medical history;Gender;Age   General Observations Patient is tangential in conversation, but redirectable. Patient with varied performance, having ambulated up to 200' with 4WW and navigated 6 stairs with supervision on ARU admission, now on evaluation, unable to ambulate due to \"feeling weak\" and with visual changes after stand pivot transfer. \"I wouldn't call it blurry, but I would say it's less definition than normal\"   Cognition   Affect/Mental Status (Cognition) WFL   Follows Commands (Cognition) follows two-step commands   Pain Assessment   Patient Currently in Pain No   Integumentary/Edema   Integumentary/Edema Comments no edema noted   Posture    Posture Forward head position;Protracted shoulders   Range of Motion (ROM)   Range of " Motion ROM is WFL   Strength (Manual Muscle Testing)   Strength Comments BLEs grossly 4/5, tested in seated   Bed Mobility   Comment, (Bed Mobility) see GG   Transfers   Comment, (Transfers) see GG   Gait/Stairs (Locomotion)   Comment, (Gait/Stairs) see GG   Balance   Balance Comments fair with BUE support for balance   Sensory Examination   Sensory Perception patient reports no sensory changes   Coordination   Coordination no deficits were identified   Muscle Tone   Muscle Tone no deficits were identified   Clinical Impression   Criteria for Skilled Therapeutic Intervention Yes, treatment indicated   PT Diagnosis (PT) physical deconditioning   Influenced by the following impairments decreased activity tolerance   Functional limitations due to impairments transfers, ambulation, stair navigation   Clinical Presentation (PT Evaluation Complexity) evolving   Clinical Presentation Rationale Pt with variable PI and variable symptoms with increased activity, including pt reporting he feels weak and is unable to continue mobility and unable to elaborate how he is not feeling well, and with visual changes that dissipate with rest.   Clinical Decision Making (Complexity) moderate complexity   Planned Therapy Interventions (PT) balance training;bed mobility training;gait training;home exercise program;neuromuscular re-education;stair training;strengthening;transfer training;risk factor education;home program guidelines;patient/family education;wheelchair management/propulsion training   Risk & Benefits of therapy have been explained evaluation/treatment results reviewed;care plan/treatment goals reviewed;risks/benefits reviewed;current/potential barriers reviewed;participants voiced agreement with care plan;participants included;patient   Clinical Impression Comments Patient presents below baseline following complex cardiac history with new LVAD heartmate 3 on 9/18/2024. Goals mod I with walker at discharge and supervision  for stairs and community mobility.   PT Total Evaluation Time   PT Eval, Moderate Complexity Minutes (19784) 40   Therapy Certification   Start of care date 10/18/24   Certification date from 10/18/24   Certification date to 11/16/24   Medical Diagnosis s/p LVAD heartmate 3   Physical Therapy Goals   PT Frequency 5x/week   PT Predicted Duration/Target Date for Goal Attainment 11/15/24   PT Goals Bed Mobility;Gait;Transfers;Stairs;Wheelchair Mobility;PT Goal 1;PT Goal 2   PT: Bed Mobility Supervision/stand-by assist   PT: Transfers Modified independent;Assistive device;Sit to/from stand;Bed to/from chair   PT: Gait Modified independent;50 feet;Rolling walker   PT: Stairs 4 stairs;Supervision/stand-by assist   PT: Wheelchair Mobility 150 feet;Caregiver SBA;manual wheelchair   PT: Goal 1 Patient will ambulate supervision 150' LRAD   PT: Goal 2 Pt will perform floor transfer with furniture for UE support with supervision   PT Discharge Planning   PT Plan PT: WC mobility GG, initiate treatment   PT Discharge Recommendation (DC Rec) other (see comments)  (continue to assess pending consistency with progress with mobility)   PT Rationale for DC Rec Concern for pt's cognitive status and needing assistance with mobility due to variable symptoms with mobility and cues for safety with mobility   PT Brief overview of current status Pt rolls independently, performs supine to sit with supervision, stand pivots with CGA, and has not ambulated at this time due to symptoms with limited standing activity   PT Equipment Needed at Discharge other (see comments)  (continue to assess)   Total Session Time   Total Session Time (sum of timed and untimed services) 40   PT - Transitional Care Unit Time   Individual Time (minutes) - PT 40   TCU Total Session Time (minutes) - PT 40   TCU Daily Total Session Time   PT TCU Daily Total Session Time 40   Rehab TCU Daily Total Session Time 40   Bed Mobility: Turning side to side/Roll Left and Right    Patient Performance Independent   Describe Performance PT each direction bed flat, no rail   Bed Mobility: Sit to lying   Describe Performance PT: supervision due to pt not feeling well, bed rail for UE support on transition   Bed Mobility: Lying to sitting on the side of bed   Describe Performance PT: supervision with bed rail, bed flat   Transfers: Sit to Stand   Describe Performance PT: CGA from bed to RW. Kathie in place x 4, reports feeling weak and inability to continue mobility assessment   Transfers: Chair/Bed transfers   Describe Performance PT CGA to pivot to sit to 4WW seat.   Walk 10 Feet - Ability to walk once standing   Reason Not Done Medical condition   Describe Performance pt not feeling well in standing with PI dropping and pt reporting weakness and visual changes   Walk 10 Feet on uneven surfaces - Ability to walk on various surfaces.   Reason Not Done Medical condition   Describe Performance pt not feeling well in standing with PI dropping and pt reporting weakness and visual changes   Walk 50 Feet with Two Turns - Ability to walk at least 50 feet.   Reason Not Done Medical condition   Describe Performance pt not feeling well in standing with PI dropping and pt reporting weakness and visual changes that dissipate with supine rest   Walk 150 Feet - Ability to walk 150 feet   Reason Not Done Medical condition   Describe Performance pt not feeling well in standing with PI dropping and pt reporting weakness and visual changes that dissipate with supine rest   Wheel 50 Feet - Ability to move wheelchair/scooter   Reason Not Done Medical condition   Describe Performance pt not feeling well in standing with PI dropping and pt reporting weakness and visual changes that dissipate with supine rest   Wheel 150 Feet - Ability to move wheelchair/scooter   Reason Not Done Medical condition   Describe Performance pt not feeling well in standing with PI dropping and pt reporting weakness and visual changes  that dissipate with supine rest   1 Step (curb) - Ability to go up/down 1 step/curb.   Reason Not Done Medical condition   Describe Performance pt not feeling well in standing with PI dropping and pt reporting weakness and visual changes that dissipate with supine rest   4 Steps - Ability to go up/down steps.   Reason Not Done Medical condition   Describe Performance pt not feeling well in standing with PI dropping and pt reporting weakness and visual changes that dissipate with supine rest   12 Steps - Ability to go up/down steps.   Reason Not Done Medical condition   Describe Performance pt not feeling well in standing with PI dropping and pt reporting weakness and visual changes that dissipate with supine rest   Car Transfer - Ability to transfer in/out of car or van.   Reason Not Done Medical condition   Describe Performance pt not feeling well in standing with PI dropping and pt reporting weakness and visual changes that dissipate with supine rest   Picking up Object - Ability to bend/stoop while standing.   Reason Not Done Medical condition   Describe Performance pt not feeling well in standing with PI dropping and pt reporting weakness and visual changes that dissipate with supine rest

## 2024-10-18 NOTE — PROGRESS NOTES
10/18/24 0702   Appointment Info   Signing Clinician's Name / Credentials (OT) KUSHAL Wisdom   Rehab Comments (OT) Certification   Living Environment   People in Home spouse   Current Living Arrangements house   Home Accessibility stairs to enter home   Number of Stairs, Main Entrance 4   Stair Railings, Main Entrance railings on both sides of stairs   Number of Stairs, Within Home, Primary greater than 10 stairs   Transportation Anticipated family or friend will provide   Living Environment Comments OT: Pt [place] with 2 cats. Worked at [employer] for 38years.  Pt lives in a 2 story home, bedroom and bathroom on 2nd floor. Pt has a stairlift to get upstairs.Pt has a walk in shower. Grab bars in shower. Pt reports he has grab bars by toilet.   Self-Care   Usual Activity Tolerance moderate   Current Activity Tolerance fair   Equipment Currently Used at Home grab bar, toilet;grab bar, tub/shower   Fall history within last six months yes   Number of times patient has fallen within last six months   (Pt reports there is a difference between falling and tripping.  He reports he has tripped and gotten right up in the past.)   Activity/Exercise/Self-Care Comment Pt was IND with ADL in the past.   Instrumental Activities of Daily Living (IADL)   IADL Comments [place], he does the primary cooking and yardwork. , wife assists with laundry, shopping, med management and finances.   Previous Level of Function/Home Environm   BADLs, Previous Functional Level independent   IADLs, Previous Functional Level independent  (shared chores with wife)   Functional Cognition, Previous Functional Level Pt has decrease cog.  Pt participated in cog testing and tx on rehab.  At this time,  recommends supervision with mobility and set-up for ADL.   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 Due to worsening functional limitations due to heart failure, patient was worked up for VAD while inpatient. Patient is now s/p implantation of HeartMate 3 LVAD on 9/18/2024. His post-operative course was complicated by Afib, pain, anxiety, insomnia, and transient right eye visual deficit. He has also required medical mgmt of his acute blood loss anemia, fluid overload, hyperglycemia, hyponatremia and B pleural effusions. He was admitted to rehab on 10/05/2024 in setting of impaired strength, impaired balance and impaired activity tolerance following his LVAD placement.   Cognitive Status Examination   Cognitive Status Comments See acute care notes (ST and OT).  Pt falls BNLs for memory and concentration.   Visual Perception   Visual Impairment/Limitations corrective lenses for reading   Pain Assessment   Patient Currently in Pain No   Posture   Posture protracted shoulders   Range of Motion Comprehensive   General Range of Motion no range of motion deficits identified  (within his precautions)   Strength Comprehensive (MMT)   General Manual Muscle Testing (MMT) Assessment   (NT due to Sternal precautions but WFLs for ADL and mobility.)   Coordination   Upper Extremity Coordination No deficits were identified   Bed Mobility   Comment (Bed Mobility) SBA   Transfers   Transfer Comments SBA   Activities of Daily Living   BADL Assessment/Intervention   (see gg scores)   Clinical Impression   Criteria for Skilled Therapeutic Interventions Met (OT) Evaluation only  (1x treatment)   OT Diagnosis Decrease activity tolerance impacting IND ADL and mobility.  (Needs LVAD monitoring) Decrease cognition.   Clinical Impression Comments Pt is on TCU s/p LVAD and rehab stay.  He lives with his wife on a [place].  At [facility], pt is cooperative and talkative.  He participated in bed mob with flat bed, room/bathroom mobility and ADL.  Pt needed to sit after bathroom mobility and standing at the window, he was unable to describe his sx but knew he needed to sit.  LVAD PI 9.2 and flow was 3.0.  After rest his PI returned to 3.5 and flow 3.2.  Per th clinical judgement, pt does not require dressing or ADL training but due to decrease cognition and the need to be monitored with his activity tolerance, pt will benefit from supervision.  DC OT at this time.   OT Total Evaluation Time   OT [facility], Low Complexity Minutes (58979) 15   Therapy Certification   Start of Care Date 10/18/24   Certification date from 10/18/24   Certification date to 11/16/24   OT Goals   Therapy Frequency (OT) One time eval and treatment   OT Predicted Duration/Target Date for Goal Attainment 10/18/24   OT Goals Hygiene/Grooming;Upper Body Dressing;Lower Body Dressing;Transfers  (goal met at eval/tx)   OT: Hygiene/Grooming supervision/stand-by assist  (goal met at eval/tx)   OT: Upper Body Dressing Supervision/stand-by assist  (goal met at eval/tx)   OT: Lower Body Dressing Supervision/stand-by assist  (goal met at eval/tx)   OT: Transfer Supervision/stand-by assist  (goal met at eval/tx)   Self-Care/Home Management   Self-Care/Home Mgmt/ADL, Compensatory, Meal Prep Minutes (23319) 30   Treatment Detail/Skilled Intervention OT: Th monitored pt's LVAD and c/o during ADL and room mobility with 4WW.  SBA for dressing, getting up/down from the toilet and bed mobility. See clinical impression above for status and recommendations.   OT Discharge Planning   OT Plan DC OT   OT Brief overview of current status See clinical impression above   Total Session Time   Timed Code Treatment Minutes 30   Total Session Time (sum  of timed and untimed services) 45   Post Acute Settings Only   What unit is patient on? TCU   OT - Acute Rehab Center Time   Individual Time (minutes) - OT 45   ARC Total Session Time (minutes) - OT 45   ARC Daily Total Session Time   OT ARC Daily Total Session Time 45   ARC Daily Rehab Total Minutes 45   Upper Body Dressing - Ability to dress/undress above the waist, including fasteners   Describe Performance OT: SBA   Lower Body Dressing - Ability to dress/undress below the waist, includes fasteners   Describe Performance OT: SBA   Lower Body Dressing (Putting On/Taking-Off Footwear)   Describe Performance OT: SBA   Eating - Ability to use utensils to bring food/liquid to mouth.   Describe Performance OT: IND   Toileting Hygiene - Ability to maintain perineal hygiene and adjust clothes   Describe Performance OT: SBA   Toilet transfer - Ability to get on and off a toilet or commode   Describe Performance OT: SBA   Personal Hygiene - Ability to maintain personal hygiene (combing hair, shaving, apply makeup wash/dry face/hands.   Describe Performance OT: SBA   Oral Hygiene - Ability to use suitable items to clean teeth.   Describe Performance OT: SBA   Shower/bathe self - Ability to bathe self, includes washing and drying self   Describe Performance OT: SBA sponge bath   Tub/Shower Transfer - Ability to get in and out of the tub/shower   Describe Performance OT: NA due to new LVAD.  Per clinical judgement, recommend SBA.

## 2024-10-18 NOTE — PROGRESS NOTES
Clinic Care Coordination Contact    Situation: Patient chart reviewed by care coordinator.  10/5/2024 - 10/17/2024 (12 days)  [facility]    Background:   #Acute on chronic systolic heart failure secondary to ICM   #HFrEF s/p HM3 LVAD as DT on 9/18/2024  #Low flow state and frequent PI events  #HTN  #CAD s/p PCI  #History of STEMI, CAD status post  PCI to LAD in 2023   #s/p ICD 5/8/2024 with history VT/VF arrest 2023     Assessment: Discharged to [place] [facility]     Plan/Recommendations: CC RN will follow up when discharged from TCU     [facility]   Ruthy Paiz RN, Care Coordinator   [facility]   E-mail [email or web address]   [phone]

## 2024-10-18 NOTE — DISCHARGE INSTRUCTIONS
Going home with your VAD    You are about to leave the hospital with your new VAD. This time can feel overwhelming. Your VAD Coordinator team is here to do everything we can to make this transition as smooth as possible. Below are contact numbers and information to help ease the process. A VAD Coordinator is available 24/7 should you have any questions. We would be more than happy to assist you.     Please remember, if you have a true emergency, ALWAYS call 911 first. Then call the on-call VAD Coordinator.     To Reach the On-Call VAD Coordinator: Dial the main hospital number at [phone]. Choose option 4 to speak with the . Ask him or her to page the on-call VAD Coordinator. We should get back to you within five minutes.     Symptoms to Report: Please contact the on-call VAD Coordinator to report:  Bleeding   Dizziness/lightheadedness  Changes in your VAD parameters (+/- 2 from baseline)  VAD alarms  Numbness, tingling, or difficulty moving your arms or legs  Changes in speech, swallowing, or mental status  ANY head injury, even if it is just a small bump  Dark, black, tarry, red, or bloody stools  If you vomit and it is bloody, black, or looks like coffee grounds  Increased drainage, redness, tenderness, or trauma to your driveline site, chest incision, or chest tube sites    Questions about your medications  Questions about your Coumadin or INR  Any other changes or concerns    Dressing Supplies: Your dressing supply company is [employer]. Please call them once you leave the hospital. They can be reached at [phone]. Verify that they have the correct address for delivery, especially if you are staying in the [place] before going home. You will need to order the dressing change kits, anchors, adhesive  remover, and sterile gloves.    Blood Thinners: Your Coumadin (warfarin) will be managed by the [facility]. They can be reached M-F, 8am-4pm.  They will communicate with you regarding your blood draws and your Coumadin dose. If you have an INR drawn and you don t hear from the clinic by 2pm please call them at [phone].  Please never allow anyone else to adjust your Coumadin or Aspirin without talking to a VAD Coordinator.     Clinic Appointments: The VAD team will schedule you for all of your follow-up visits. If you have any questions please call the main VAD office at [phone] to speak with our . You can also contact your VAD Coordinator.     VAD Coordinator: Your VAD Coordinator, Franchesca Haddad, can be reached M-F, 8am-4pm, via [employer], by phone at [phone] or by e-mail at [email or web address].    If you have any further questions or concerns about your VAD please refer to the discharge folder you received from your VAD Coordinator and/or call the on-call VAD Coordinator.

## 2024-10-18 NOTE — PROGRESS NOTES
"Cross-cover note:    Paged by TCU nurse that patient's LVAD was beeping repeatedly. Nurse had reached out to LVAD coordinator, who provided no specific feedback. Received significant pushback from heart failure URIAH.  Evaluated patient in person. Patient is wholly asymptomatic. MAP = 110 mmHg. MAR reviewed. Added hydralazine 25 mg PO TID. Will re-check blood pressure with goal MAP 65 to 85 mmHg. Will contact LVAD coordinator if pressures do not improve. Dr. Alonso Valentine called directly and was very helpful.    Vital signs:  Temp: 98.7  F (37.1  C) Temp src: Oral   Pulse: 76   Resp: 16 SpO2: 98 % O2 Device: None (Room air)     Weight: 63.4 kg (139 lb 12.4 oz) (batteries on)  Estimated body mass index is 22.56 kg/m  as calculated from the following:    Height as of 10/5/24: 1.676 m (5' 6\").    Weight as of this encounter: 63.4 kg (139 lb 12.4 oz).    GENERAL: Alert and oriented x 3; no acute distress; well-nourished.  HEENT: Normocephalic; atraumatic; PERRLA; MMM.  CV: LVAD \"hum.\"  RESP: Lung fields clear to aucultation B/L; no wheezing or crepitations.  GI: Abdomen is soft, nontender, nondistended; no organomegaly; normal bowel sounds.  : Deferred genital examination.   MSK: No clubbing, cyanosis, or edema.  DERM: Skin is intact; no rash, lesions, or skin breakdown.  NEURO: No focal deficits appreciated; strength & sensorium are grossly intact.  PSYCH: No active hallucinations; affect, insight appear within normal limits.    "

## 2024-10-18 NOTE — PHARMACY-TCU REVIEW OF H&P
I have reviewed this patient's TCU admission History & Physical for medication related changes/recommendations identified by the admitting provider.  I am confirming that there are no recommendations requiring changes to medication orders are indicated at this time based on the provider recommendations in the H&P.    Taye Esposito, AnushaD, BCPS

## 2024-10-18 NOTE — PLAN OF CARE
Goal Outcome Evaluation:      Plan of Care Reviewed With: patient    Overall Patient Progress: improvingOverall Patient Progress: improving  LVAD, heartmate III. Denied pain or discomfort. Denied chest pain, SOB, N/V. MAP was 98 this morning.  At 1400 MAP was 82.No LVAD alarms in this shift.Pt was seen by provider . Echo was done around 1410. Encouraged PO hydration. Easy to chew (level 7 ) diet.Continent of B&B. Uses urinal at bed side. Scattered bruises on both arms. Abrasions on right shin, left arm. Mid sternal incision scabbed and ABD  incisions MELISSA. Mepelix at coccyx intact. Changed LVAD drive line dressing. Ate with fair appetite. Call light with in reach. Continue with POC.     1230-4152: Map 98. Denied pain or discomfort. No other acute issues on this shift. Pt c/o irritation at right upper arm PIV . Removed right PIV. No noted bleeding applied dressing.      Patient's most recent vital signs are:     Vital signs:  BP: [MAP 84[  Temp: 98.8  HR: 71  RR: 16  SpO2: 98 %     Patient does not have new respiratory symptoms.  Patient does not have new sore throat.  Patient does not have a fever greater than 99.5.

## 2024-10-18 NOTE — PHARMACY-MEDICATION REGIMEN REVIEW
Pharmacy Medication Regimen Review  Duane C Johnson is a 74 year old male who is currently in the Transitional Care Unit.    Assessment: All medications have an appropriate indications, durations and no unnecessary use was found    Plan:   Continue current medications as ordered  Attending provider will be sent this note for review.  If there are any emergent issues noted above, pharmacist will contact provider directly by phone.      Pharmacy will periodically review the resident's medication regimen for any PRN medications not administered in > 72 hours and discontinue them. The pharmacist will discuss gradual dose reductions of psychopharmacologic medications with interdisciplinary team on a regular basis.    Please contact pharmacy if the above does not answer specific medication questions/concerns.    Background:  A pharmacist has reviewed all medications and pertinent medical history today.  Medications were reviewed for appropriate use and any irregularities found are listed with recommendations.      Current Facility-Administered Medications:     [START ON 10/20/2024] - Skin Test Reading -, , Does not apply, Q21 Days, Abiola Schilling MD    acetaminophen (TYLENOL) tablet 975 mg, 975 mg, Oral, Q8H PRN, Abiola Schilling MD    amiodarone (PACERONE) tablet 200 mg, 200 mg, Oral, Daily, Abiola Schilling MD, 200 mg at 10/18/24 0917    atorvastatin (LIPITOR) tablet 80 mg, 80 mg, Oral, QPM, Abiola Schilling MD, 80 mg at 10/17/24 2115    bisacodyl (DULCOLAX) suppository 10 mg, 10 mg, Rectal, Daily PRN, Abiola Schilling MD    escitalopram (LEXAPRO) tablet 10 mg, 10 mg, Oral or Feeding Tube, Daily, Abiola Schilling MD, 10 mg at 10/18/24 0917    gabapentin (NEURONTIN) capsule 100 mg, 100 mg, Oral or Feeding Tube, At Bedtime, Abiola Schilling MD, 100 mg at 10/17/24 2114    hydrALAZINE (APRESOLINE) tablet 50 mg, 50 mg, Oral, Q8H KATIUSKA, Tamiko Moreira, APRN CNP    lidocaine (LMX4) cream, , Topical, Q1H PRN, Abiola Schilling  MD    lidocaine 1 % 0.1-1 mL, 0.1-1 mL, Other, Q1H PRN, Abiola Schilling MD    losartan (COZAAR) tablet 50 mg, 50 mg, Oral, BID, Tamiko Moreira APRN CNP    magnesium oxide (MAG-OX) tablet 400 mg, 400 mg, Oral, Daily, Abiola Schilling MD, 400 mg at 10/18/24 0917    melatonin tablet 10 mg, 10 mg, Oral, At Bedtime, Abiola Schilling MD, 10 mg at 10/17/24 2115    methocarbamol (ROBAXIN) tablet 750 mg, 750 mg, Oral, TID, Abiola Schilling MD, 750 mg at 10/18/24 0917    multivitamin w/minerals (THERA-VIT-M) tablet 1 tablet, 1 tablet, Oral, Daily, Abiola Schilling MD, 1 tablet at 10/18/24 0917    naloxone (NARCAN) injection 0.2 mg, 0.2 mg, Intravenous, Q2 Min PRN **OR** naloxone (NARCAN) injection 0.4 mg, 0.4 mg, Intravenous, Q2 Min PRN **OR** naloxone (NARCAN) injection 0.2 mg, 0.2 mg, Intramuscular, Q2 Min PRN **OR** naloxone (NARCAN) injection 0.4 mg, 0.4 mg, Intramuscular, Q2 Min PRN, Abiola Schliling MD    Nurse may request from Pharmacy a change of form of medication (e.g. Liquid to tablet)., , Does not apply, Continuous PRN, Abiola Schilling MD    ondansetron (ZOFRAN ODT) ODT tab 4 mg, 4 mg, Oral, Q6H PRN **OR** ondansetron (ZOFRAN) injection 4 mg, 4 mg, Intravenous, Q6H PRN, Abiola Schilling MD    oxyCODONE (ROXICODONE) tablet 5 mg, 5 mg, Oral, Q4H PRN, Abiola Schilling MD    pantoprazole (PROTONIX) EC tablet 40 mg, 40 mg, Oral, QAM AC, Abiola Schilling MD, 40 mg at 10/18/24 0624    Patient is already receiving anticoagulation with heparin, enoxaparin (LOVENOX), warfarin (COUMADIN)  or other anticoagulant medication, , Does not apply, Continuous PRN, Abiola Schilling MD    polyethylene glycol (MIRALAX) Packet 17 g, 17 g, Oral or Feeding Tube, BID PRN, Abiola Schilling MD    prochlorperazine (COMPAZINE) injection 5 mg, 5 mg, Intravenous, Q6H PRN **OR** prochlorperazine (COMPAZINE) tablet 5 mg, 5 mg, Oral, Q6H PRN **OR** prochlorperazine (COMPAZINE) suppository 12.5 mg, 12.5 mg, Rectal, Q12H PRN, Abiola Schilling,  MD    senna-docusate (SENOKOT-S/PERICOLACE) 8.6-50 MG per tablet 2 tablet, 2 tablet, Oral or Feeding Tube, BID PRN, Abiola Schilling MD    sodium chloride (PF) 0.9% PF flush 3 mL, 3 mL, Intracatheter, q1 min prn, Abiola Schilling MD    tuberculin injection 5 Units, 5 Units, Intradermal, Q21 Days, Abiola Schilling MD    warfarin ANTICOAGULANT (COUMADIN) tablet 2.5 mg, 2.5 mg, Oral, ONCE at 18:00, Abiola Schilling MD    Warfarin Dose Required Daily - Pharmacist Managed, 1 each, Oral, See Admin Instructions, Abiola Schilling MD  No current outpatient prescriptions on file.   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

## 2024-10-19 ENCOUNTER — APPOINTMENT (OUTPATIENT)
Dept: PHYSICAL THERAPY | Facility: SKILLED NURSING FACILITY | Age: 74
DRG: 292 | End: 2024-10-19
Attending: STUDENT IN AN ORGANIZED HEALTH CARE EDUCATION/TRAINING PROGRAM
Payer: MEDICARE

## 2024-10-19 LAB
HOLD SPECIMEN: NORMAL
INR PPP: 2.35 (ref 0.85–1.15)

## 2024-10-19 PROCEDURE — 85610 PROTHROMBIN TIME: CPT | Performed by: STUDENT IN AN ORGANIZED HEALTH CARE EDUCATION/TRAINING PROGRAM

## 2024-10-19 PROCEDURE — 97530 THERAPEUTIC ACTIVITIES: CPT | Mod: GP

## 2024-10-19 PROCEDURE — 36415 COLL VENOUS BLD VENIPUNCTURE: CPT | Performed by: STUDENT IN AN ORGANIZED HEALTH CARE EDUCATION/TRAINING PROGRAM

## 2024-10-19 PROCEDURE — 120N000009 HC R&B SNF

## 2024-10-19 PROCEDURE — 250N000013 HC RX MED GY IP 250 OP 250 PS 637: Performed by: NURSE PRACTITIONER

## 2024-10-19 PROCEDURE — 97110 THERAPEUTIC EXERCISES: CPT | Mod: GP

## 2024-10-19 PROCEDURE — 250N000013 HC RX MED GY IP 250 OP 250 PS 637: Performed by: STUDENT IN AN ORGANIZED HEALTH CARE EDUCATION/TRAINING PROGRAM

## 2024-10-19 PROCEDURE — 250N000013 HC RX MED GY IP 250 OP 250 PS 637: Performed by: INTERNAL MEDICINE

## 2024-10-19 RX ORDER — WARFARIN SODIUM 2.5 MG/1
2.5 TABLET ORAL
Status: COMPLETED | OUTPATIENT
Start: 2024-10-19 | End: 2024-10-19

## 2024-10-19 RX ADMIN — METHOCARBAMOL 750 MG: 750 TABLET ORAL at 09:01

## 2024-10-19 RX ADMIN — PANTOPRAZOLE SODIUM 40 MG: 40 TABLET, DELAYED RELEASE ORAL at 06:05

## 2024-10-19 RX ADMIN — ATORVASTATIN CALCIUM 80 MG: 40 TABLET, FILM COATED ORAL at 20:32

## 2024-10-19 RX ADMIN — Medication 10 MG: at 20:32

## 2024-10-19 RX ADMIN — Medication 1 TABLET: at 09:01

## 2024-10-19 RX ADMIN — AMIODARONE HYDROCHLORIDE 200 MG: 200 TABLET ORAL at 09:01

## 2024-10-19 RX ADMIN — HYDRALAZINE HYDROCHLORIDE 75 MG: 50 TABLET ORAL at 21:04

## 2024-10-19 RX ADMIN — ESCITALOPRAM OXALATE 10 MG: 10 TABLET ORAL at 09:01

## 2024-10-19 RX ADMIN — HYDRALAZINE HYDROCHLORIDE 75 MG: 50 TABLET ORAL at 13:48

## 2024-10-19 RX ADMIN — MAGNESIUM OXIDE TAB 400 MG (241.3 MG ELEMENTAL MG) 400 MG: 400 (241.3 MG) TAB at 09:01

## 2024-10-19 RX ADMIN — WARFARIN SODIUM 2.5 MG: 2.5 TABLET ORAL at 18:22

## 2024-10-19 RX ADMIN — FLUDROCORTISONE ACETATE 0.1 MG: 0.1 TABLET ORAL at 09:01

## 2024-10-19 RX ADMIN — LOSARTAN POTASSIUM 50 MG: 50 TABLET, FILM COATED ORAL at 20:32

## 2024-10-19 RX ADMIN — METHOCARBAMOL 750 MG: 750 TABLET ORAL at 13:48

## 2024-10-19 RX ADMIN — GABAPENTIN 100 MG: 100 CAPSULE ORAL at 20:32

## 2024-10-19 RX ADMIN — METHOCARBAMOL 750 MG: 750 TABLET ORAL at 20:32

## 2024-10-19 RX ADMIN — HYDRALAZINE HYDROCHLORIDE 50 MG: 50 TABLET ORAL at 06:05

## 2024-10-19 RX ADMIN — LOSARTAN POTASSIUM 50 MG: 50 TABLET, FILM COATED ORAL at 09:02

## 2024-10-19 ASSESSMENT — ACTIVITIES OF DAILY LIVING (ADL)
ADLS_ACUITY_SCORE: 41

## 2024-10-19 NOTE — PLAN OF CARE
Goal Outcome Evaluation:      Plan of Care Reviewed With: patient    Overall Patient Progress: improvingOverall Patient Progress: improving     Overall pt had no acute issue this shift. Pt is alert and oriented x 4. Able to make needs known. Pt denied having discomfort this shift. LVAD heart mate III with parameters within defined limit. 2 beeps noted this shift. MAP checked this morning =84. Hydralazine administered per order. Pt provided water per POC. Appears comfortable at this time. Will continue with POC

## 2024-10-19 NOTE — PLAN OF CARE
Goal Outcome Evaluation:      Plan of Care Reviewed With: patient    Overall Patient Progress: improvingOverall Patient Progress: improving  Patient is A&O x 4.MAP 80, LVAD heart mate III.Denied chest pain, SOB, N/V. At 1400 MAP 80 . Encouraged fluids. Easy to chew (level 7 ) diet.Continent of B&B. Uses urinal at bed side. Scattered bruises on both arms. Abrasions on right shin, left arm. Mid sternal incision scabbed and ABD  incisions MELISSA. Mepelix at coccyx intact. Changed LVAD drive line dressing. Ate with fair appetite.Pt friend at bedside during lunch time.Call light with in reach. Continue with POC.     BP: [MAP 80  Temp: 97.8  HR: 78  RR: 18  SpO2: 100 %     Patient does not have new respiratory symptoms.  Patient does not have new sore throat.  Patient does not have a fever greater than 99.5.

## 2024-10-20 LAB — INR PPP: 2.4 (ref 0.85–1.15)

## 2024-10-20 PROCEDURE — 120N000009 HC R&B SNF

## 2024-10-20 PROCEDURE — 250N000013 HC RX MED GY IP 250 OP 250 PS 637: Performed by: INTERNAL MEDICINE

## 2024-10-20 PROCEDURE — 250N000013 HC RX MED GY IP 250 OP 250 PS 637: Performed by: STUDENT IN AN ORGANIZED HEALTH CARE EDUCATION/TRAINING PROGRAM

## 2024-10-20 PROCEDURE — 250N000013 HC RX MED GY IP 250 OP 250 PS 637: Performed by: NURSE PRACTITIONER

## 2024-10-20 PROCEDURE — 85610 PROTHROMBIN TIME: CPT | Performed by: STUDENT IN AN ORGANIZED HEALTH CARE EDUCATION/TRAINING PROGRAM

## 2024-10-20 PROCEDURE — 36415 COLL VENOUS BLD VENIPUNCTURE: CPT | Performed by: STUDENT IN AN ORGANIZED HEALTH CARE EDUCATION/TRAINING PROGRAM

## 2024-10-20 RX ORDER — WARFARIN SODIUM 2.5 MG/1
2.5 TABLET ORAL
Status: COMPLETED | OUTPATIENT
Start: 2024-10-20 | End: 2024-10-20

## 2024-10-20 RX ADMIN — HYDRALAZINE HYDROCHLORIDE 75 MG: 50 TABLET ORAL at 14:26

## 2024-10-20 RX ADMIN — METHOCARBAMOL 750 MG: 750 TABLET ORAL at 08:27

## 2024-10-20 RX ADMIN — HYDRALAZINE HYDROCHLORIDE 75 MG: 50 TABLET ORAL at 06:25

## 2024-10-20 RX ADMIN — Medication 10 MG: at 21:21

## 2024-10-20 RX ADMIN — METHOCARBAMOL 750 MG: 750 TABLET ORAL at 21:21

## 2024-10-20 RX ADMIN — WARFARIN SODIUM 2.5 MG: 2.5 TABLET ORAL at 18:18

## 2024-10-20 RX ADMIN — FLUDROCORTISONE ACETATE 0.1 MG: 0.1 TABLET ORAL at 08:27

## 2024-10-20 RX ADMIN — ESCITALOPRAM OXALATE 10 MG: 10 TABLET ORAL at 08:26

## 2024-10-20 RX ADMIN — LOSARTAN POTASSIUM 50 MG: 50 TABLET, FILM COATED ORAL at 08:26

## 2024-10-20 RX ADMIN — AMIODARONE HYDROCHLORIDE 200 MG: 200 TABLET ORAL at 08:26

## 2024-10-20 RX ADMIN — PANTOPRAZOLE SODIUM 40 MG: 40 TABLET, DELAYED RELEASE ORAL at 06:26

## 2024-10-20 RX ADMIN — HYDRALAZINE HYDROCHLORIDE 75 MG: 50 TABLET ORAL at 21:20

## 2024-10-20 RX ADMIN — LOSARTAN POTASSIUM 50 MG: 50 TABLET, FILM COATED ORAL at 21:21

## 2024-10-20 RX ADMIN — ATORVASTATIN CALCIUM 80 MG: 40 TABLET, FILM COATED ORAL at 21:21

## 2024-10-20 RX ADMIN — MAGNESIUM OXIDE TAB 400 MG (241.3 MG ELEMENTAL MG) 400 MG: 400 (241.3 MG) TAB at 08:27

## 2024-10-20 RX ADMIN — Medication 1 TABLET: at 08:26

## 2024-10-20 RX ADMIN — METHOCARBAMOL 750 MG: 750 TABLET ORAL at 14:26

## 2024-10-20 RX ADMIN — GABAPENTIN 100 MG: 100 CAPSULE ORAL at 21:20

## 2024-10-20 ASSESSMENT — ACTIVITIES OF DAILY LIVING (ADL)
ADLS_ACUITY_SCORE: 41

## 2024-10-20 NOTE — PROGRESS NOTES
Duane C Johnson is a 74 year old male with the following diagnoses:   1. Transient visual loss of right eye    2. Migraine aura without headache         Patient was sent for consultation by Dr. Coles for atypical amaurosis in the setting of extensive cardiovascular disease risk factors    HPI:    Patient is a 74 year old male with multiple vascular risk factors with recent LVAD placement on 9/18/2024 who notes a blue band on the superior half of the visual field of the right eye that is opaque. The band comes and goes and usually lasts between 5 to 10 minutes. Resolves gradually. Onset is also gradual. Occasionally involves the inferior visual field. On average, episode comes on once every 2-3 days. First noticed around 9/29 (date of Dr. Griggs's evaluation). Patient denies any blurry vision. Patient denies any headaches. Patient denies any double vision.     Independent historians:  Patient    Review of outside testing:  CT Head w/o contrast 10/9/24:  Impression:  1. No acute intracranial pathology.   2. Unchanged slightly asymmetric right frontal extra-axial space  compared to the left.  3. Chronic maxillary sinusitis.    CTA Head and Neck with contrast on 9/29/24  Impression:    1. No acute intracranial pathology. Chronic left maxillary sinusitis.  2. Head CTA demonstrates no aneurysm or stenosis of the major  intracranial arteries.   3. Neck CTA demonstrates no stenosis of the major cervical arteries.  4. Moderate right and loculated left pleural effusions with associated  moderate pulmonary artery enlargement likely related to transient  pulmonary hypertension.    CT Head w/o contrast 9/29/24  Impression:    1. No acute intracranial pathology. Chronic left maxillary sinusitis.  2. Head CTA demonstrates no aneurysm or stenosis of the major  intracranial arteries.   3. Neck CTA demonstrates no stenosis of the major cervical arteries.  4. Moderate right and loculated left pleural effusions with  associated  moderate pulmonary artery enlargement likely related to transient  pulmonary hypertension.    My interpretation performed today of outside testing:  I have independently reviewed CT and CTA performed 9/29/24. No abnormalities anywhere in the visual pathway.        Review of outside clinical notes:  Inpatient Consult with Dr. Griggs on 9/29/24:  # Atypical amaurosis, right eye   Patient with transient painless contrast sensitivity in the superior aspect of right eye vision on repeat episodes with subsequent return to baseline. VA 20/30 +2 right eye and 20/30 -2 left eye. No APD, IOP nml, color plates full, and no gross visual field cut. His exam is reassuring against CRAO, as there is well-preserved vision, no retinal whitening, Hollenhorst plaques, or boxcarring. Another consideration would be NAION given superior field vision changes, but there is no APD or disc edema on exam. GCA ROS negative also reassuring. He does have some perhaps mildly dilated retinal veins, but lacks other signs of CRVO such as diffuse intraretinal hemorrhages. Most likely diagnosis in this case is atypical amaurosis in the setting of extensive cardiovascular disease risk factors. He also may have an impending CRVO in the right eye.      RECOMMENDATIONS:  - Agree with neurology plan for non-con CT head and CTA head and neck; patient has a contraindication to MRI (LVAD)  - Defer additional recommendations to neurology  - Control of cardiovascular risk factors including blood sugar, blood pressure, and HLD per primary team   - Non-urgent follow up in ophthalmology clinic for repeat dilated exam, OCT macula each eye, and IVFA with transit right eye   - Please page ophthalmology if patient experiences new vision loss or visual decompensation     Neurology Assessment and Plan 9/30/24:  #Transient neurological symptom; (Bluish hue in R superior quadrantx 2 episodes)  Duane C Johnson is a 74 year old male with PMHx of STEMI 10/2023,  A-fib s/p DCCV on 9/5 and on warfarin, HTN, HLD, and s/p LVAD placement on 9/18 who has been experiencing transient visual changes over the last day prompting a neurology stroke referral, who is being evaluated by stroke neurology for a transient visual change. He has numerous risk factors for possible stroke including the recent LVAD procedure, A-fib, HTN, smoker, and HLD. He is currently supratherapeutic on his AC with an INR of 3.3. His neurologic exam is completely benign. 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 MRI unforunately can not be obtained due to his LVAD. Given he is already on anticoagulation and had a recent procedure, he would not be a candidate for thrombolysis. At this time there are no deficits to indicate an LVO requiring thrombectomy. He was evaluated by ophthalmology, who ruled out CRAO, GCA and NAION as an etiology. Their impression was atypical amaurosis. We are not fully convinced for amaurosis with the clinical presentation of bluish hue and he was able to see through it. Even, if he had those, he is on Warfarin and does not require any additional therapy for secondary prevention.      Recommendations:  -Continue Warfarin per primary team   No further stroke evaluation is recommended, so we will sign off. Please contact us with any additional questions.      Past medical history:    Patient Active Problem List   Diagnosis    Hyperlipidemia LDL goal <130    Prostate cancer (H)    Systolic CHF (H)    Anxiety    Atherosclerosis of native coronary artery of native heart with angina pectoris (H)    Left inguinal hernia    Ischemic cardiomyopathy    Anticoagulated    Hypotension    Acute on chronic systolic congestive heart failure (H)    Personal history of malignant neoplasm of prostate    Pleural effusion    History of tobacco use     AICD (automatic cardioverter/defibrillator) present    Acute exacerbation of CHF (congestive heart failure) (H)    Atrial fibrillation, unspecified type (H)    LVAD (left ventricular assist device) present (H)    Chronic systolic congestive heart failure (H)    Anticoagulated on warfarin    History of left ventricular assist device (LVAD) (H)    Physical deconditioning         Medications:     acetaminophen  Amiodarone 200 mg  atorvastatin  bisacodyl  empagliflozin Tabs  escitalopram  FISH OIL-VITAMIN D PO  fludrocortisone  gabapentin  hydrALAZINE  lidocaine  losartan  magnesium hydroxide  magnesium oxide  melatonin  Melatonin Tabs  methocarbamol  multivitamin w/minerals  naloxone  ondansetron  oxyCODONE  pantoprazole  polyethylene glycol  prochlorperazine  senna-docusate  sodium chloride (PF)  tuberculin  warfarin ANTICOAGULANT  Warfarin Therapy Reminder  zz ims template      Family history / social history:  Patient's family history includes GI problems in his father; Obesity in his mother; Other Cancer in his mother; Uterine Cancer in his sister.     Patient  reports that he quit smoking about a year ago. His smoking use included cigarettes. He started smoking about 31 years ago. He has a 7.5 pack-year smoking history. He has been exposed to tobacco smoke. He has never used smokeless tobacco. He reports current alcohol use. He reports that he does not use drugs.       Exam:  Visual acuity 20/25 +2 right eye 20/25 -3 left eye.  Color vision 11/11 right eye and 11/11 left eye.  Pupils round and reactive with no APD.  Intraocular pressure 9 right eye and 9 left eye.  Anterior segment exam normal.  Fundus exam normal.     Tests ordered and interpreted today:    Glaucoma Top OU          Performed by: BD   .   Good fix, not dilated.     Right Eye  Test Findings Free Text: Nonspecific defects superiorly and inferiorly but no agatha scotoma   . MD: -1.4   . Interpretation: Normal   . Plan: Monitor   . Interval: Initial    .     Left Eye  Test Findings Free Text: Nonspecific defects   . MD: 0   . Interpretation: Normal   . Plan: Monitor   . Interval: Initial   .          OCT Optic Nerve RNFL Spectralis OU (both eyes)          Performed by: POPEYE   .     Right Eye  Reliability of the test: Good   . Test Findings: Normal   . Average Thickness Value: 102   . Interpretation: Normal   . Plan: Monitor   . Interval: Initial   .     Left Eye  Reliability of the test: Good   . Test Findings: Normal   . Average Thickness Value: 100   . Interpretation: Normal   . Plan: Monitor   . Interval: Initial   .              Discussion of management / interpretation with another provider:   None    Assessment/Plan:   It is my impression that patient has migraine aura without headache. Patient notes several weeks of transient vision changes that resolve within 5-10 minutes. Reports gradual onset and resolution. The episodes are stereotypic.  This story is not consistent with amaurosis fugax.  Unremarkable exam today, and unable to elicit vision symptoms during exam today. CTA on date of symptom onset without significant carotid disease burden. Area of vision changes seem to follow a vascular distribution. Chart review suggests amiodarone use at last since October 2023. Patient is not sure how long he has been on the amiodarone and patient does exhibit some corneal changes. However, there are no signs optic neuropathy today.     There is no need to schedule a follow up appointment at this time. I will have the patient follow up as needed.                Attending Physician Attestation:  Complete documentation of historical and exam elements from today's encounter can be found in the full encounter summary report (not reduplicated in this progress note).  I personally obtained the chief complaint(s) and history of present illness.  I confirmed and edited as necessary the review of systems, past medical/surgical history, family history, social history, and  examination findings as documented by others; and I examined the patient myself.  I personally reviewed the relevant tests, images, and reports as documented above.  I formulated and edited as necessary the assessment and plan and discussed the findings and management plan with the patient and family. I personally reviewed the ophthalmic test(s) associated with this encounter, agree with the interpretation(s) as documented by the resident/fellow, and have edited the corresponding report(s) as necessary.  - Stanislaw Urias MD     Precharting: Nova Smith MD  PGY-3  Department of Ophthalmology  October 22, 2024 11:13 AM

## 2024-10-20 NOTE — PLAN OF CARE
Goal Outcome Evaluation:      Plan of Care Reviewed With: patient    Overall Patient Progress: improvingOverall Patient Progress: improving    INR 2.4 today.LVAD heart mate III.Denied chest pain, SOB, N/V. MAP 82 , at 1420 MAP 98. Denied SOB, N/V, and chest pain.Continent of B&B, uses urinal at bed side. Encouraged fluids. Ate with good appetite.Changed LVAD drive line dressing.Mid sternal incision scabbed and ABD incisions MELISSA.Call light with in reach. Continue with POC. Eye appointment on 10/22/24 at 9:15 AM.       Patient's most recent vital signs are:     Vital signs:  BP: [MAP 80[  Temp: 98.2  HR: 74  RR: 16  SpO2: 99 %     Patient does not have new respiratory symptoms.  Patient does not have new sore throat.  Patient does not have a fever greater than 99.5.

## 2024-10-20 NOTE — PLAN OF CARE
Goal Outcome Evaluation:      Plan of Care Reviewed With: patient    Overall Patient Progress: improvingOverall Patient Progress: improving     No acute events overnight. Denies CP, N/V, SOB. LVAD monitor WNL. Using urinal at bedside. Makes needs known. Call light within reach.

## 2024-10-21 ENCOUNTER — APPOINTMENT (OUTPATIENT)
Dept: PHYSICAL THERAPY | Facility: SKILLED NURSING FACILITY | Age: 74
DRG: 292 | End: 2024-10-21
Attending: STUDENT IN AN ORGANIZED HEALTH CARE EDUCATION/TRAINING PROGRAM
Payer: MEDICARE

## 2024-10-21 ENCOUNTER — TELEPHONE (OUTPATIENT)
Dept: OPHTHALMOLOGY | Facility: CLINIC | Age: 74
End: 2024-10-21
Payer: MEDICARE

## 2024-10-21 LAB
ANION GAP SERPL CALCULATED.3IONS-SCNC: 10 MMOL/L (ref 7–15)
BUN SERPL-MCNC: 23.6 MG/DL (ref 8–23)
CALCIUM SERPL-MCNC: 8.7 MG/DL (ref 8.8–10.4)
CHLORIDE SERPL-SCNC: 94 MMOL/L (ref 98–107)
CREAT SERPL-MCNC: 0.67 MG/DL (ref 0.67–1.17)
EGFRCR SERPLBLD CKD-EPI 2021: >90 ML/MIN/1.73M2
ERYTHROCYTE [DISTWIDTH] IN BLOOD BY AUTOMATED COUNT: 16.9 % (ref 10–15)
GLUCOSE SERPL-MCNC: 97 MG/DL (ref 70–99)
HCO3 SERPL-SCNC: 28 MMOL/L (ref 22–29)
HCT VFR BLD AUTO: 32 % (ref 40–53)
HGB BLD-MCNC: 10.8 G/DL (ref 13.3–17.7)
INR PPP: 2.4 (ref 0.85–1.15)
LDH SERPL L TO P-CCNC: 233 U/L (ref 0–250)
MCH RBC QN AUTO: 32 PG (ref 26.5–33)
MCHC RBC AUTO-ENTMCNC: 33.8 G/DL (ref 31.5–36.5)
MCV RBC AUTO: 95 FL (ref 78–100)
PLATELET # BLD AUTO: 205 10E3/UL (ref 150–450)
POTASSIUM SERPL-SCNC: 4.3 MMOL/L (ref 3.4–5.3)
RBC # BLD AUTO: 3.38 10E6/UL (ref 4.4–5.9)
SODIUM SERPL-SCNC: 132 MMOL/L (ref 135–145)
WBC # BLD AUTO: 7.2 10E3/UL (ref 4–11)

## 2024-10-21 PROCEDURE — 36415 COLL VENOUS BLD VENIPUNCTURE: CPT | Performed by: STUDENT IN AN ORGANIZED HEALTH CARE EDUCATION/TRAINING PROGRAM

## 2024-10-21 PROCEDURE — 250N000013 HC RX MED GY IP 250 OP 250 PS 637: Performed by: NURSE PRACTITIONER

## 2024-10-21 PROCEDURE — 250N000013 HC RX MED GY IP 250 OP 250 PS 637: Performed by: STUDENT IN AN ORGANIZED HEALTH CARE EDUCATION/TRAINING PROGRAM

## 2024-10-21 PROCEDURE — 83615 LACTATE (LD) (LDH) ENZYME: CPT | Performed by: STUDENT IN AN ORGANIZED HEALTH CARE EDUCATION/TRAINING PROGRAM

## 2024-10-21 PROCEDURE — 80048 BASIC METABOLIC PNL TOTAL CA: CPT | Performed by: STUDENT IN AN ORGANIZED HEALTH CARE EDUCATION/TRAINING PROGRAM

## 2024-10-21 PROCEDURE — 120N000009 HC R&B SNF

## 2024-10-21 PROCEDURE — 97530 THERAPEUTIC ACTIVITIES: CPT | Mod: GP

## 2024-10-21 PROCEDURE — 85018 HEMOGLOBIN: CPT | Performed by: STUDENT IN AN ORGANIZED HEALTH CARE EDUCATION/TRAINING PROGRAM

## 2024-10-21 PROCEDURE — 85610 PROTHROMBIN TIME: CPT | Performed by: STUDENT IN AN ORGANIZED HEALTH CARE EDUCATION/TRAINING PROGRAM

## 2024-10-21 PROCEDURE — 250N000013 HC RX MED GY IP 250 OP 250 PS 637: Performed by: INTERNAL MEDICINE

## 2024-10-21 RX ORDER — WARFARIN SODIUM 2.5 MG/1
2.5 TABLET ORAL
Status: COMPLETED | OUTPATIENT
Start: 2024-10-21 | End: 2024-10-21

## 2024-10-21 RX ORDER — HYDRALAZINE HYDROCHLORIDE 50 MG/1
100 TABLET, FILM COATED ORAL EVERY 8 HOURS SCHEDULED
Status: DISCONTINUED | OUTPATIENT
Start: 2024-10-21 | End: 2024-10-22

## 2024-10-21 RX ADMIN — WARFARIN SODIUM 2.5 MG: 2.5 TABLET ORAL at 19:14

## 2024-10-21 RX ADMIN — ATORVASTATIN CALCIUM 80 MG: 40 TABLET, FILM COATED ORAL at 20:51

## 2024-10-21 RX ADMIN — HYDRALAZINE HYDROCHLORIDE 100 MG: 50 TABLET ORAL at 13:51

## 2024-10-21 RX ADMIN — MAGNESIUM OXIDE TAB 400 MG (241.3 MG ELEMENTAL MG) 400 MG: 400 (241.3 MG) TAB at 09:24

## 2024-10-21 RX ADMIN — AMIODARONE HYDROCHLORIDE 200 MG: 200 TABLET ORAL at 09:24

## 2024-10-21 RX ADMIN — LOSARTAN POTASSIUM 50 MG: 50 TABLET, FILM COATED ORAL at 20:52

## 2024-10-21 RX ADMIN — ESCITALOPRAM OXALATE 10 MG: 10 TABLET ORAL at 09:24

## 2024-10-21 RX ADMIN — METHOCARBAMOL 750 MG: 750 TABLET ORAL at 09:24

## 2024-10-21 RX ADMIN — ACETAMINOPHEN 975 MG: 325 TABLET, FILM COATED ORAL at 22:05

## 2024-10-21 RX ADMIN — HYDRALAZINE HYDROCHLORIDE 75 MG: 50 TABLET ORAL at 05:57

## 2024-10-21 RX ADMIN — HYDRALAZINE HYDROCHLORIDE 100 MG: 50 TABLET ORAL at 21:02

## 2024-10-21 RX ADMIN — METHOCARBAMOL 750 MG: 750 TABLET ORAL at 20:51

## 2024-10-21 RX ADMIN — FLUDROCORTISONE ACETATE 0.1 MG: 0.1 TABLET ORAL at 09:24

## 2024-10-21 RX ADMIN — PANTOPRAZOLE SODIUM 40 MG: 40 TABLET, DELAYED RELEASE ORAL at 05:57

## 2024-10-21 RX ADMIN — Medication 10 MG: at 20:51

## 2024-10-21 RX ADMIN — GABAPENTIN 100 MG: 100 CAPSULE ORAL at 20:51

## 2024-10-21 RX ADMIN — Medication 1 TABLET: at 09:24

## 2024-10-21 RX ADMIN — LOSARTAN POTASSIUM 50 MG: 50 TABLET, FILM COATED ORAL at 09:25

## 2024-10-21 RX ADMIN — METHOCARBAMOL 750 MG: 750 TABLET ORAL at 13:51

## 2024-10-21 ASSESSMENT — ACTIVITIES OF DAILY LIVING (ADL)
ADLS_ACUITY_SCORE: 41

## 2024-10-21 NOTE — PLAN OF CARE
Goal Outcome Evaluation:      Plan of Care Reviewed With: patient    Overall Patient Progress: improving    Pt alert and oriented x 4, can make needs known. Pt continent of both bowel and bladder, urinal by bedside emptied. LVAD heartmate 3 parameters met, no alarms noted. Drive line dressing CDI. MAP 92 this morning. Pt encouraged to take PO hydration (up to 8 16oz glasses of water per cards) no acute issue on this shift, call light within reach. Continue with plan of care.         Patient's most recent vital signs are:     Vital signs:  BP: MAP 80  Temp: 98.2  HR: 74  RR: 16  SpO2: 99 %     Patient does not have new respiratory symptoms.  Patient does not have new sore throat.  Patient does not have a fever greater than 99.5.

## 2024-10-21 NOTE — PLAN OF CARE
MDS Pain Assessment    The following is the pain interview as conducted by the TCU RN caring for the patient on October 21, 2024. This assessment is required by the [facility] for all patients in [place] [facility] (Skilled Nursing Facilities).     . Pain Presence  Have you had pain or hurting at any time in the last 5 days?   0. No    . Pain Frequency  How much of the time have you experienced pain or hurting over the last 5 days?   1. Rarely or not at all    . Pain Effect on Sleep  Over the past 5 days, how much of the time has pain made it hard for you to sleep at night?   1. Rarely or not at all    . Pain Interference with Therapy Activities  Over the past 5 days, how often have you limited your participation in rehabilitation therapy sessions due to pain?   1. Rarely or not at all    . Pain Interference with Day-to-Day Activities  Over the past 5 days, have you limited your day-to-day activities (excluding rehabilitation therapy sessions) because of pain?  1. Rarely or not at all    . Pain intensity   Numeric Rating Scale (00-10): Please rate your worst pain over the last 5 days on a zero to ten scale, with zero being no pain and ten as the worst pain you can imagine. 00

## 2024-10-21 NOTE — PROGRESS NOTES
MAPs reviewed. Increase hydralazine to 100 mg tid (ordered for you). Patient Care Order placed to correlate doppler MAP and cuff pressure to ensure pressures are MAPs    Jody Moreira DNP, ANP-C  Nurse Practitioner in Advanced Heart Failure/Cardiac Transplant/Cardiovascular Genetics

## 2024-10-21 NOTE — TELEPHONE ENCOUNTER
Called patient re: eye appointment tomorrow.     Patient wants to keep appointment. Transport arranged for patient via transitional care unit.     Ellie Held [facility] Facilitator on 10/21/2024 at 11:42 AM

## 2024-10-21 NOTE — PLAN OF CARE
VS: Pulse 50   Temp 98  F (36.7  C) (Oral)   Resp 16   Wt 61.6 kg (135 lb 14.4 oz)   SpO2 98%   BMI 21.93 kg/m      O2: 98% on RA   Output: Continent bowel and bladder   Last BM: 10/21/24   Activity: Independent in room with walker   Skin: Driveline site, MELISSA surgical incision on abdomen, L PIV   Pain: Denies   CMS:  Neuro: Alert and oriented x4, able to make needs known.    Dressing: Driveline dressing changed, L PIV CDI   Diet: Regular diet, thin liquids, takes meds whole   LDA: L PIV   Equipment: Call light, walker   Plan: Con't POC.    Additional Info: Patient was calm and cooperative with all cares, denies SOB and CP. Patient c/o vision problems in R eye, noted to be baseline. Driveline dressing changed, MAP was 78 this shift. No acute issues this shift, continue POC.    Goal Outcome Evaluation:      Plan of Care Reviewed With: patient    Overall Patient Progress: improvingOverall Patient Progress: improving         Patient's most recent vital signs are:     Vital signs:  BP: [MAP 78[  Temp: 98  HR: 50  RR: 16  SpO2: 98 %     Patient does not have new respiratory symptoms.  Patient does not have new sore throat.  Patient does not have a fever greater than 99.5.

## 2024-10-22 ENCOUNTER — OFFICE VISIT (OUTPATIENT)
Dept: OPHTHALMOLOGY | Facility: CLINIC | Age: 74
End: 2024-10-22
Attending: OPHTHALMOLOGY
Payer: MEDICARE

## 2024-10-22 DIAGNOSIS — H53.121 TRANSIENT VISUAL LOSS OF RIGHT EYE: Primary | ICD-10-CM

## 2024-10-22 DIAGNOSIS — G43.109 MIGRAINE AURA WITHOUT HEADACHE: ICD-10-CM

## 2024-10-22 LAB — INR PPP: 2.3 (ref 0.85–1.15)

## 2024-10-22 PROCEDURE — 99204 OFFICE O/P NEW MOD 45 MIN: CPT | Mod: GC | Performed by: OPHTHALMOLOGY

## 2024-10-22 PROCEDURE — 92133 CPTRZD OPH DX IMG PST SGM ON: CPT | Performed by: OPHTHALMOLOGY

## 2024-10-22 PROCEDURE — 250N000013 HC RX MED GY IP 250 OP 250 PS 637: Performed by: NURSE PRACTITIONER

## 2024-10-22 PROCEDURE — G0463 HOSPITAL OUTPT CLINIC VISIT: HCPCS | Performed by: OPHTHALMOLOGY

## 2024-10-22 PROCEDURE — 85610 PROTHROMBIN TIME: CPT | Performed by: STUDENT IN AN ORGANIZED HEALTH CARE EDUCATION/TRAINING PROGRAM

## 2024-10-22 PROCEDURE — 92083 EXTENDED VISUAL FIELD XM: CPT | Performed by: OPHTHALMOLOGY

## 2024-10-22 PROCEDURE — 250N000013 HC RX MED GY IP 250 OP 250 PS 637: Performed by: INTERNAL MEDICINE

## 2024-10-22 PROCEDURE — 36415 COLL VENOUS BLD VENIPUNCTURE: CPT | Performed by: STUDENT IN AN ORGANIZED HEALTH CARE EDUCATION/TRAINING PROGRAM

## 2024-10-22 PROCEDURE — 120N000009 HC R&B SNF

## 2024-10-22 PROCEDURE — 250N000013 HC RX MED GY IP 250 OP 250 PS 637: Performed by: STUDENT IN AN ORGANIZED HEALTH CARE EDUCATION/TRAINING PROGRAM

## 2024-10-22 RX ORDER — DIPHENHYDRAMINE HCL 25 MG
50 CAPSULE ORAL
Status: DISCONTINUED | OUTPATIENT
Start: 2024-10-22 | End: 2024-10-23 | Stop reason: HOSPADM

## 2024-10-22 RX ORDER — DIPHENHYDRAMINE HYDROCHLORIDE 50 MG/ML
50 INJECTION INTRAMUSCULAR; INTRAVENOUS
Status: DISCONTINUED | OUTPATIENT
Start: 2024-10-22 | End: 2024-10-23 | Stop reason: HOSPADM

## 2024-10-22 RX ORDER — WARFARIN SODIUM 2.5 MG/1
2.5 TABLET ORAL
Status: COMPLETED | OUTPATIENT
Start: 2024-10-22 | End: 2024-10-22

## 2024-10-22 RX ADMIN — HYDRALAZINE HYDROCHLORIDE 75 MG: 50 TABLET ORAL at 21:35

## 2024-10-22 RX ADMIN — Medication 1 TABLET: at 08:22

## 2024-10-22 RX ADMIN — HYDRALAZINE HYDROCHLORIDE 100 MG: 50 TABLET ORAL at 06:11

## 2024-10-22 RX ADMIN — Medication 10 MG: at 21:35

## 2024-10-22 RX ADMIN — AMIODARONE HYDROCHLORIDE 200 MG: 200 TABLET ORAL at 08:22

## 2024-10-22 RX ADMIN — ESCITALOPRAM OXALATE 10 MG: 10 TABLET ORAL at 08:22

## 2024-10-22 RX ADMIN — ATORVASTATIN CALCIUM 80 MG: 40 TABLET, FILM COATED ORAL at 21:35

## 2024-10-22 RX ADMIN — PANTOPRAZOLE SODIUM 40 MG: 40 TABLET, DELAYED RELEASE ORAL at 06:11

## 2024-10-22 RX ADMIN — METHOCARBAMOL 750 MG: 750 TABLET ORAL at 14:08

## 2024-10-22 RX ADMIN — GABAPENTIN 100 MG: 100 CAPSULE ORAL at 21:35

## 2024-10-22 RX ADMIN — METHOCARBAMOL 750 MG: 750 TABLET ORAL at 19:09

## 2024-10-22 RX ADMIN — FLUDROCORTISONE ACETATE 0.1 MG: 0.1 TABLET ORAL at 08:22

## 2024-10-22 RX ADMIN — MAGNESIUM OXIDE TAB 400 MG (241.3 MG ELEMENTAL MG) 400 MG: 400 (241.3 MG) TAB at 08:21

## 2024-10-22 RX ADMIN — HYDRALAZINE HYDROCHLORIDE 75 MG: 50 TABLET ORAL at 14:08

## 2024-10-22 RX ADMIN — LOSARTAN POTASSIUM 50 MG: 50 TABLET, FILM COATED ORAL at 21:35

## 2024-10-22 RX ADMIN — WARFARIN SODIUM 2.5 MG: 2.5 TABLET ORAL at 19:09

## 2024-10-22 RX ADMIN — METHOCARBAMOL 750 MG: 750 TABLET ORAL at 08:22

## 2024-10-22 ASSESSMENT — ACTIVITIES OF DAILY LIVING (ADL)
ADLS_ACUITY_SCORE: 41

## 2024-10-22 ASSESSMENT — CONF VISUAL FIELD
OS_NORMAL: 1
METHOD: COUNTING FINGERS
OD_INFERIOR_TEMPORAL_RESTRICTION: 0
OD_NORMAL: 1
OD_SUPERIOR_TEMPORAL_RESTRICTION: 0
OS_SUPERIOR_NASAL_RESTRICTION: 0
OD_SUPERIOR_NASAL_RESTRICTION: 0
OS_INFERIOR_TEMPORAL_RESTRICTION: 0
OS_SUPERIOR_TEMPORAL_RESTRICTION: 0
OD_INFERIOR_NASAL_RESTRICTION: 0
OS_INFERIOR_NASAL_RESTRICTION: 0

## 2024-10-22 ASSESSMENT — SLIT LAMP EXAM - LIDS: COMMENTS: NORMAL

## 2024-10-22 ASSESSMENT — EXTERNAL EXAM - LEFT EYE: OS_EXAM: NORMAL

## 2024-10-22 ASSESSMENT — VISUAL ACUITY
METHOD: SNELLEN - LINEAR
OD_SC+: +2
OS_SC+: -3
OS_SC: 20/25
OD_SC: 20/25

## 2024-10-22 ASSESSMENT — EXTERNAL EXAM - RIGHT EYE: OD_EXAM: NORMAL

## 2024-10-22 ASSESSMENT — TONOMETRY
OS_IOP_MMHG: 09
OD_IOP_MMHG: 09
IOP_METHOD: ICARE

## 2024-10-22 ASSESSMENT — CUP TO DISC RATIO
OD_RATIO: 0.25
OS_RATIO: 0.2

## 2024-10-22 NOTE — PLAN OF CARE
Goal Outcome Evaluation:      Plan of Care Reviewed With: patient    Overall Patient Progress: improvingOverall Patient Progress: improving    PT left for eye doctor appointment around 0830 , transportation service picked up by wheel chair.this morning MAP was 60 with doppler , BP 62/49, P 54 and MAP 54. Pt denied SOB, N/V, and chest pain ,dizziness. LVAD heart mate III. RN updated in house provider.No noted  LVAD flow alarms in this shift.Losartan 50 mg not given. RN updated Sedrick Mosher RN(cardiology) . Hydralazin decrease to 75 mg.Pt is alert and oriented x 4.able to make needs known. Denied pain or discomfort. Encouraged fluids . Pt returned back to facility around 1330. MAP at 1400 was 84. Midline chest incision  and drive line CDI. Pt is in bed resting . Call light with in reach. Continue with POC.Left PIV removed . Applied dressing , no noted bleeding.      Patient's most recent vital signs are:     Vital signs:  BP: 62/49[MAP 58[  Temp: 97.9  HR: 54  RR: 18  SpO2: 97 %     Patient does not have new respiratory symptoms.  Patient does not have new sore throat.  Patient does not have a fever greater than 99.5.

## 2024-10-22 NOTE — LETTER
2024     RE:  :  MRN: Duane C Johnson  1950  [id number]     Dear Dr. Coles    Thank you for asking me to see your very pleasant patient,Duane C Johnson, in neuro-ophthalmic consultation.  I would like to thank you for sending your records and I have summarized them in the history of present illness.   My assessment and plan are below.  For further details, please see my attached clinic note.      Duane C Johnson is a 74 year old male with the following diagnoses:   1. Transient visual loss of right eye    2. Migraine aura without headache         Patient was sent for consultation by Dr. Coles for atypical amaurosis in the setting of extensive cardiovascular disease risk factors    HPI:    Patient is a 74 year old male with multiple vascular risk factors with recent LVAD placement on 2024 who notes a blue band on the superior half of the visual field of the right eye that is opaque. The band comes and goes and usually lasts between 5 to 10 minutes. Resolves gradually. Onset is also gradual. Occasionally involves the inferior visual field. On average, episode comes on once every 2-3 days. First noticed around  (date of Dr. Griggs's evaluation). Patient denies any blurry vision. Patient denies any headaches. Patient denies any double vision.     Independent historians:  Patient    Review of outside testing:  CT Head w/o contrast 10/9/24:  Impression:  1. No acute intracranial pathology.   2. Unchanged slightly asymmetric right frontal extra-axial space  compared to the left.  3. Chronic maxillary sinusitis.    CTA Head and Neck with contrast on 24  Impression:    1. No acute intracranial pathology. Chronic left maxillary sinusitis.  2. Head CTA demonstrates no aneurysm or stenosis of the major  intracranial arteries.   3. Neck CTA demonstrates no stenosis of the major cervical arteries.  4. Moderate right and loculated left pleural effusions with associated  moderate pulmonary artery  enlargement likely related to transient  pulmonary hypertension.    CT Head w/o contrast 9/29/24  Impression:    1. No acute intracranial pathology. Chronic left maxillary sinusitis.  2. Head CTA demonstrates no aneurysm or stenosis of the major  intracranial arteries.   3. Neck CTA demonstrates no stenosis of the major cervical arteries.  4. Moderate right and loculated left pleural effusions with associated  moderate pulmonary artery enlargement likely related to transient  pulmonary hypertension.    My interpretation performed today of outside testing:  I have independently reviewed CT and CTA performed 9/29/24. No abnormalities anywhere in the visual pathway.        Review of outside clinical notes:  Inpatient Consult with Dr. Griggs on 9/29/24:  # Atypical amaurosis, right eye   Patient with transient painless contrast sensitivity in the superior aspect of right eye vision on repeat episodes with subsequent return to baseline. VA 20/30 +2 right eye and 20/30 -2 left eye. No APD, IOP nml, color plates full, and no gross visual field cut. His exam is reassuring against CRAO, as there is well-preserved vision, no retinal whitening, Hollenhorst plaques, or boxcarring. Another consideration would be NAION given superior field vision changes, but there is no APD or disc edema on exam. GCA ROS negative also reassuring. He does have some perhaps mildly dilated retinal veins, but lacks other signs of CRVO such as diffuse intraretinal hemorrhages. Most likely diagnosis in this case is atypical amaurosis in the setting of extensive cardiovascular disease risk factors. He also may have an impending CRVO in the right eye.      RECOMMENDATIONS:  - Agree with neurology plan for non-con CT head and CTA head and neck; patient has a contraindication to MRI (LVAD)  - Defer additional recommendations to neurology  - Control of cardiovascular risk factors including blood sugar, blood pressure, and HLD per primary team   -  Non-urgent follow up in ophthalmology clinic for repeat dilated exam, OCT macula each eye, and IVFA with transit right eye   - Please page ophthalmology if patient experiences new vision loss or visual decompensation     Neurology Assessment and Plan 9/30/24:  #Transient neurological symptom; (Bluish hue in R superior quadrantx 2 episodes)  Duane C Johnson is a 74 year old male with PMHx of STEMI 10/2023, A-fib s/p DCCV on 9/5 and on warfarin, HTN, HLD, and s/p LVAD placement on 9/18 who has been experiencing transient visual changes over the last day prompting a neurology stroke referral, who is being evaluated by stroke neurology for a transient visual change. He has numerous risk factors for possible stroke including the recent LVAD procedure, A-fib, HTN, smoker, and HLD. He is currently supratherapeutic on his AC with an INR of 3.3. His neurologic exam is completely benign. 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 MRI unforunately can not be obtained due to his LVAD. Given he is already on anticoagulation and had a recent procedure, he would not be a candidate for thrombolysis. At this time there are no deficits to indicate an LVO requiring thrombectomy. He was evaluated by ophthalmology, who ruled out CRAO, GCA and NAION as an etiology. Their impression was atypical amaurosis. We are not fully convinced for amaurosis with the clinical presentation of bluish hue and he was able to see through it. Even, if he had those, he is on Warfarin and does not require any additional therapy for secondary prevention.      Recommendations:  -Continue Warfarin per primary team   No further stroke evaluation is recommended, so we will sign off. Please contact us with any additional questions.      Past medical history:    Patient Active Problem List   Diagnosis     Hyperlipidemia LDL goal <130    Prostate cancer (H)    Systolic CHF (H)    Anxiety    Atherosclerosis of native coronary artery of native heart with angina pectoris (H)    Left inguinal hernia    Ischemic cardiomyopathy    Anticoagulated    Hypotension    Acute on chronic systolic congestive heart failure (H)    Personal history of malignant neoplasm of prostate    Pleural effusion    History of tobacco use    AICD (automatic cardioverter/defibrillator) present    Acute exacerbation of CHF (congestive heart failure) (H)    Atrial fibrillation, unspecified type (H)    LVAD (left ventricular assist device) present (H)    Chronic systolic congestive heart failure (H)    Anticoagulated on warfarin    History of left ventricular assist device (LVAD) (H)    Physical deconditioning         Medications:     acetaminophen  Amiodarone 200 mg  atorvastatin  bisacodyl  empagliflozin Tabs  escitalopram  FISH OIL-VITAMIN D PO  fludrocortisone  gabapentin  hydrALAZINE  lidocaine  losartan  magnesium hydroxide  magnesium oxide  melatonin  Melatonin Tabs  methocarbamol  multivitamin w/minerals  naloxone  ondansetron  oxyCODONE  pantoprazole  polyethylene glycol  prochlorperazine  senna-docusate  sodium chloride (PF)  tuberculin  warfarin ANTICOAGULANT  Warfarin Therapy Reminder  zz ims template      Family history / social history:  Patient's family history includes GI problems in his father; Obesity in his mother; Other Cancer in his mother; Uterine Cancer in his sister.     Patient  reports that he quit smoking about a year ago. His smoking use included cigarettes. He started smoking about 31 years ago. He has a 7.5 pack-year smoking history. He has been exposed to tobacco smoke. He has never used smokeless tobacco. He reports current alcohol use. He reports that he does not use drugs.       Exam:  Visual acuity 20/25 +2 right eye 20/25 -3 left eye.  Color vision 11/11 right eye and 11/11 left eye.  Pupils round and reactive with  no APD.  Intraocular pressure 9 right eye and 9 left eye.  Anterior segment exam normal.  Fundus exam normal.     Tests ordered and interpreted today:    Glaucoma Top OU          Performed by: BD   .   Good fix, not dilated.     Right Eye  Test Findings Free Text: Nonspecific defects superiorly and inferiorly but no agatha scotoma   . MD: -1.4   . Interpretation: Normal   . Plan: Monitor   . Interval: Initial   .     Left Eye  Test Findings Free Text: Nonspecific defects   . MD: 0   . Interpretation: Normal   . Plan: Monitor   . Interval: Initial   .          OCT Optic Nerve RNFL Spectralis OU (both eyes)          Performed by: TLT   .     Right Eye  Reliability of the test: Good   . Test Findings: Normal   . Average Thickness Value: 102   . Interpretation: Normal   . Plan: Monitor   . Interval: Initial   .     Left Eye  Reliability of the test: Good   . Test Findings: Normal   . Average Thickness Value: 100   . Interpretation: Normal   . Plan: Monitor   . Interval: Initial   .              Discussion of management / interpretation with another provider:   None    Assessment/Plan:   It is my impression that patient has migraine aura without headache. Patient notes several weeks of transient vision changes that resolve within 5-10 minutes. Reports gradual onset and resolution. The episodes are stereotypic.  This story is not consistent with amaurosis fugax.  Unremarkable exam today, and unable to elicit vision symptoms during exam today. CTA on date of symptom onset without significant carotid disease burden. Area of vision changes seem to follow a vascular distribution. Chart review suggests amiodarone use at last since October 2023. Patient is not sure how long he has been on the amiodarone and patient does exhibit some corneal changes. However, there are no signs optic neuropathy today.     There is no need to schedule a follow up appointment at this time. I will have the patient follow up as needed.               Again, thank you for allowing me to participate in the care of your patient.      Sincerely,    Stanislaw Urias MD  Professor  Director of Neuro-Ophthalmology  [employer] [facility] [employer] Endowed Chair  Departments of Ophthalmology, Neurology, and Neurosurgery  [facility] [address] [place]  [address]  T - [phone]  F - [phone]  [email or web address]      CC: Jose Coles MD  [address] [place] [id number]  Via In Basket    DX = migraine aura without headache

## 2024-10-22 NOTE — PROGRESS NOTES
I called Duane this morning and also talked to his RN. Duane reports feeling well today with no dizzyness. He said he has had dizzyness recently when he is up walking around but not in bed. He has not had LOW FLOW alarms since Friday per the RN. This morning his doppler MAP was 60 for which his Losartan was held. Presently, his VAD is 5100, 4.2 LPM, 2.4 PI, and 3.3 jackson. Over the past several days,t he hydralazine was ramped up to 100mg TID an florineff was started.    The lower MAPS are permitting the VAD to flow better with flows in the 4s.    I spoke with Sirisha Arias, the Cards 2 NP about his MAPs and lack of LOW FLOW alarms for three days. We talked about the possibility of adjusting the hydralazine to bring the MAPS 65 to 85. Sirisha said will talk with the rehab doctors to discuss this.

## 2024-10-22 NOTE — CARE PLAN
Shift: 1500 - 1930    No acute change or events; VSS, Denies shortness of breath,chest pain, no fever or chills. Pt is able to make needs know, call light is in reach, continue with POC.

## 2024-10-22 NOTE — PROGRESS NOTES
10/22/24 1430   Appointment Info   Signing Clinician's Name / Credentials (PT) Agnieszka Rivera PTA   Appointment Canceled Reason (PT) Patient declined   Appointment Cancel Comments (PT) PT: -45 min d/t Pt just starting to eat and wanting Th to return later. Offered to attempt but clear with Pt that Th may not be able to return d/t schedule.

## 2024-10-22 NOTE — PLAN OF CARE
Goal Outcome Evaluation:    Plan of Care Reviewed With: patient    Overall Patient Progress: no change    Pt alert and oriented x4. Uses call light appropriately, makes needs known to staff. HM3 LVAD dressing in place and intact. LVAD numbers within parameters, no alarms throughout night. 2100 MAP via doppler was 70, BP 82/58 with MAP calculated at 66. 0600 MAP via doppler was 78, BP 80/52 with MAP calculated at 61. No complaints of SOB, N/V, chest pain this shift. Had complaints of mid back pain at 2200, tylenol and hot pack provided per pt request with relief. Level 7 easy to chew diet, able to take medications whole with thin liquids. Urinal at bedside with staff to empty. Sternal precautions maintained. No other issues this shift. Will continue with plan of care.

## 2024-10-22 NOTE — SIGNIFICANT EVENT
Significant Event Note    Time of event: 8:46 AM October 22, 2024    Description of event:  - Notified by RN about patient's MAP of 60 this morning   - Reached out to heart failure team.  - Denies any dizziness; was also at his ophthalmology appt    Plan:  - Hydralazine reduced to 75 mg Q8H now; MAPs improved   - Patient will receive COVID vaccine tomorrow    Discussed with: bedside nurse    MARINA BUTLER MD

## 2024-10-22 NOTE — PLAN OF CARE
VS: BP (!) 82/50 (BP Location: Left arm)   Pulse 70   Temp 97.9  F (36.6  C) (Oral)   Resp 18   Wt 62.1 kg (137 lb)   SpO2 97%   BMI 22.11 kg/m  MAP 78    LVAD Heartmate 3  Flow: 3.9 LPM  Pulse index:3.2  Speed: 5150 RPM  Power: 3.3 W   O2:  97% room air    Output:  Continent B/B uses urinal   Intake:1080mL Output:1200mL     Last BM:  10/21/2024   Activity:  MOD I in the room with walker   Pain: Denies   CMS: A&O x 4   Skin & Dressing: LVAD dressing changed CDI, no signs of infection at the site.   Sternal incision MELISSA  Lateral lower chest MELISSA   LDA: N/A   Diet: Easy chew diet (level 7), pills whole with thin liquids   Equipment: Personal belongings.    Plan: Will continue to follow POC    Additional Info: Pt is able to make needs known.   Denies CP, SOB, and N/V.   Pt had an eye doctor appointment this morning.   No acute issues this shift. Call light within reach.

## 2024-10-22 NOTE — PROGRESS NOTES
Cards 2 Brief Note    Notified by LVAD coordinator of low MAP (60) this AM but with stable LVAD parameters, no low flows. Okay with holding AM dose of losartan, but going forward we will reduce hydralazine to 75 mg q8h and continue losartan 50 mg BID with hold parameters for MAP < 65.     Sirisha Arias, [place], NP-C

## 2024-10-23 ENCOUNTER — OFFICE VISIT (OUTPATIENT)
Dept: CARDIOLOGY | Facility: CLINIC | Age: 74
End: 2024-10-23
Attending: PHYSICIAN ASSISTANT
Payer: MEDICARE

## 2024-10-23 ENCOUNTER — APPOINTMENT (OUTPATIENT)
Dept: CT IMAGING | Facility: CLINIC | Age: 74
DRG: 286 | End: 2024-10-23
Attending: EMERGENCY MEDICINE
Payer: MEDICARE

## 2024-10-23 ENCOUNTER — DOCUMENTATION ONLY (OUTPATIENT)
Dept: ANTICOAGULATION | Facility: CLINIC | Age: 74
End: 2024-10-23

## 2024-10-23 ENCOUNTER — LAB (OUTPATIENT)
Dept: LAB | Facility: CLINIC | Age: 74
End: 2024-10-23
Payer: MEDICARE

## 2024-10-23 ENCOUNTER — ANCILLARY PROCEDURE (OUTPATIENT)
Dept: CARDIOLOGY | Facility: CLINIC | Age: 74
End: 2024-10-23
Attending: INTERNAL MEDICINE
Payer: MEDICARE

## 2024-10-23 ENCOUNTER — HOSPITAL ENCOUNTER (INPATIENT)
Facility: CLINIC | Age: 74
LOS: 17 days | Discharge: HOME HEALTH | DRG: 286 | End: 2024-11-09
Attending: EMERGENCY MEDICINE | Admitting: INTERNAL MEDICINE
Payer: MEDICARE

## 2024-10-23 ENCOUNTER — APPOINTMENT (OUTPATIENT)
Dept: CARDIOLOGY | Facility: CLINIC | Age: 74
DRG: 286 | End: 2024-10-23
Payer: MEDICARE

## 2024-10-23 ENCOUNTER — DOCUMENTATION ONLY (OUTPATIENT)
Dept: CARDIOLOGY | Facility: CLINIC | Age: 74
End: 2024-10-23

## 2024-10-23 ENCOUNTER — HOSPITAL ENCOUNTER (INPATIENT)
Facility: SKILLED NURSING FACILITY | Age: 74
End: 2024-10-23
Attending: INTERNAL MEDICINE | Admitting: INTERNAL MEDICINE
Payer: MEDICARE

## 2024-10-23 VITALS
OXYGEN SATURATION: 97 % | DIASTOLIC BLOOD PRESSURE: 77 MMHG | RESPIRATION RATE: 18 BRPM | BODY MASS INDEX: 22.1 KG/M2 | TEMPERATURE: 97.9 F | HEART RATE: 70 BPM | SYSTOLIC BLOOD PRESSURE: 93 MMHG | WEIGHT: 136.9 LBS

## 2024-10-23 VITALS — OXYGEN SATURATION: 98 % | SYSTOLIC BLOOD PRESSURE: 48 MMHG

## 2024-10-23 DIAGNOSIS — I50.22 CHRONIC SYSTOLIC CONGESTIVE HEART FAILURE (H): ICD-10-CM

## 2024-10-23 DIAGNOSIS — R29.898 RIGHT ARM WEAKNESS: ICD-10-CM

## 2024-10-23 DIAGNOSIS — I50.22 CHRONIC SYSTOLIC CONGESTIVE HEART FAILURE (H): Primary | ICD-10-CM

## 2024-10-23 DIAGNOSIS — Z95.811 HISTORY OF LEFT VENTRICULAR ASSIST DEVICE (LVAD) (H): ICD-10-CM

## 2024-10-23 DIAGNOSIS — Z79.01 ANTICOAGULATED ON WARFARIN: ICD-10-CM

## 2024-10-23 DIAGNOSIS — I48.91 ATRIAL FIBRILLATION, UNSPECIFIED TYPE (H): ICD-10-CM

## 2024-10-23 DIAGNOSIS — Z95.811 LVAD (LEFT VENTRICULAR ASSIST DEVICE) PRESENT (H): ICD-10-CM

## 2024-10-23 DIAGNOSIS — E83.42 HYPOMAGNESEMIA: ICD-10-CM

## 2024-10-23 DIAGNOSIS — Z95.811 LVAD (LEFT VENTRICULAR ASSIST DEVICE) PRESENT (H): Primary | ICD-10-CM

## 2024-10-23 DIAGNOSIS — R41.89 COGNITIVE CHANGE: ICD-10-CM

## 2024-10-23 DIAGNOSIS — I95.89 OTHER SPECIFIED HYPOTENSION: ICD-10-CM

## 2024-10-23 DIAGNOSIS — I95.9 TRANSIENT HYPOTENSION: ICD-10-CM

## 2024-10-23 LAB
ABO/RH(D): NORMAL
ANION GAP SERPL CALCULATED.3IONS-SCNC: 12 MMOL/L (ref 7–15)
ANTIBODY SCREEN: NEGATIVE
ATRIAL RATE - MUSE: NORMAL BPM
BASE EXCESS BLDV CALC-SCNC: -1 MMOL/L (ref -3–3)
BASE EXCESS BLDV CALC-SCNC: 1 MMOL/L (ref -3–3)
BASOPHILS # BLD AUTO: 0.1 10E3/UL (ref 0–0.2)
BASOPHILS NFR BLD AUTO: 1 %
BUN SERPL-MCNC: 24.4 MG/DL (ref 8–23)
CA-I BLD-MCNC: 4.4 MG/DL (ref 4.4–5.2)
CALCIUM SERPL-MCNC: 8.6 MG/DL (ref 8.8–10.4)
CHLORIDE SERPL-SCNC: 94 MMOL/L (ref 98–107)
CPB POCT: NO
CREAT BLD-MCNC: 0.6 MG/DL (ref 0.7–1.3)
CREAT SERPL-MCNC: 0.77 MG/DL (ref 0.67–1.17)
DIASTOLIC BLOOD PRESSURE - MUSE: NORMAL MMHG
EGFRCR SERPLBLD CKD-EPI 2021: >60 ML/MIN/1.73M2
EGFRCR SERPLBLD CKD-EPI 2021: >90 ML/MIN/1.73M2
EOSINOPHIL # BLD AUTO: 0.1 10E3/UL (ref 0–0.7)
EOSINOPHIL NFR BLD AUTO: 1 %
ERYTHROCYTE [DISTWIDTH] IN BLOOD BY AUTOMATED COUNT: 17.2 % (ref 10–15)
GLUCOSE BLD-MCNC: 97 MG/DL (ref 70–99)
GLUCOSE BLDC GLUCOMTR-MCNC: 109 MG/DL (ref 70–99)
GLUCOSE BLDC GLUCOMTR-MCNC: 72 MG/DL (ref 70–99)
GLUCOSE SERPL-MCNC: 108 MG/DL (ref 70–99)
HCO3 BLDV-SCNC: 24 MMOL/L (ref 21–28)
HCO3 BLDV-SCNC: 26 MMOL/L (ref 21–28)
HCO3 SERPL-SCNC: 23 MMOL/L (ref 22–29)
HCT VFR BLD AUTO: 35.5 % (ref 40–53)
HCT VFR BLD CALC: 34 % (ref 40–53)
HGB BLD-MCNC: 11.4 G/DL (ref 13.3–17.7)
HGB BLD-MCNC: 11.6 G/DL (ref 13.3–17.7)
HOLD SPECIMEN: NORMAL
IMM GRANULOCYTES # BLD: 0.1 10E3/UL
IMM GRANULOCYTES NFR BLD: 1 %
INR PPP: 2.26 (ref 0.85–1.15)
INTERPRETATION ECG - MUSE: NORMAL
LACTATE BLD-SCNC: 1.1 MMOL/L
LVEF ECHO: NORMAL
LYMPHOCYTES # BLD AUTO: 0.4 10E3/UL (ref 0.8–5.3)
LYMPHOCYTES NFR BLD AUTO: 4 %
MAGNESIUM SERPL-MCNC: 1.8 MG/DL (ref 1.7–2.3)
MAGNESIUM SERPL-MCNC: 1.9 MG/DL (ref 1.7–2.3)
MCH RBC QN AUTO: 30.8 PG (ref 26.5–33)
MCHC RBC AUTO-ENTMCNC: 32.1 G/DL (ref 31.5–36.5)
MCV RBC AUTO: 96 FL (ref 78–100)
MONOCYTES # BLD AUTO: 0.6 10E3/UL (ref 0–1.3)
MONOCYTES NFR BLD AUTO: 8 %
NEUTROPHILS # BLD AUTO: 7.1 10E3/UL (ref 1.6–8.3)
NEUTROPHILS NFR BLD AUTO: 86 %
NRBC # BLD AUTO: 0 10E3/UL
NRBC BLD AUTO-RTO: 0 /100
P AXIS - MUSE: NORMAL DEGREES
PCO2 BLDV: 38 MM HG (ref 40–50)
PCO2 BLDV: 42 MM HG (ref 40–50)
PH BLDV: 7.4 [PH] (ref 7.32–7.43)
PH BLDV: 7.41 [PH] (ref 7.32–7.43)
PLATELET # BLD AUTO: 206 10E3/UL (ref 150–450)
PO2 BLDV: 30 MM HG (ref 25–47)
PO2 BLDV: 30 MM HG (ref 25–47)
POTASSIUM BLD-SCNC: 3.8 MMOL/L (ref 3.4–5.3)
POTASSIUM SERPL-SCNC: 4.5 MMOL/L (ref 3.4–5.3)
PR INTERVAL - MUSE: NORMAL MS
QRS DURATION - MUSE: 110 MS
QT - MUSE: 406 MS
QTC - MUSE: 499 MS
R AXIS - MUSE: -89 DEGREES
RBC # BLD AUTO: 3.7 10E6/UL (ref 4.4–5.9)
SAO2 % BLDV: 57 % (ref 70–75)
SAO2 % BLDV: 59 % (ref 70–75)
SODIUM BLD-SCNC: 132 MMOL/L (ref 135–145)
SODIUM SERPL-SCNC: 129 MMOL/L (ref 135–145)
SPECIMEN EXPIRATION DATE: NORMAL
SYSTOLIC BLOOD PRESSURE - MUSE: NORMAL MMHG
T AXIS - MUSE: 76 DEGREES
TROPONIN T SERPL HS-MCNC: 70 NG/L
TROPONIN T SERPL HS-MCNC: 92 NG/L
VENTRICULAR RATE- MUSE: 91 BPM
WBC # BLD AUTO: 8.2 10E3/UL (ref 4–11)

## 2024-10-23 PROCEDURE — 82962 GLUCOSE BLOOD TEST: CPT

## 2024-10-23 PROCEDURE — 36415 COLL VENOUS BLD VENIPUNCTURE: CPT | Performed by: NURSE PRACTITIONER

## 2024-10-23 PROCEDURE — 85610 PROTHROMBIN TIME: CPT | Performed by: PATHOLOGY

## 2024-10-23 PROCEDURE — 84484 ASSAY OF TROPONIN QUANT: CPT | Performed by: EMERGENCY MEDICINE

## 2024-10-23 PROCEDURE — 36415 COLL VENOUS BLD VENIPUNCTURE: CPT | Performed by: PATHOLOGY

## 2024-10-23 PROCEDURE — 82947 ASSAY GLUCOSE BLOOD QUANT: CPT | Performed by: EMERGENCY MEDICINE

## 2024-10-23 PROCEDURE — 70496 CT ANGIOGRAPHY HEAD: CPT | Mod: MG

## 2024-10-23 PROCEDURE — 86900 BLOOD TYPING SEROLOGIC ABO: CPT | Performed by: EMERGENCY MEDICINE

## 2024-10-23 PROCEDURE — 83735 ASSAY OF MAGNESIUM: CPT | Performed by: NURSE PRACTITIONER

## 2024-10-23 PROCEDURE — 70496 CT ANGIOGRAPHY HEAD: CPT | Mod: 26 | Performed by: RADIOLOGY

## 2024-10-23 PROCEDURE — 250N000011 HC RX IP 250 OP 636: Performed by: EMERGENCY MEDICINE

## 2024-10-23 PROCEDURE — 83735 ASSAY OF MAGNESIUM: CPT | Performed by: EMERGENCY MEDICINE

## 2024-10-23 PROCEDURE — 99215 OFFICE O/P EST HI 40 MIN: CPT | Mod: 24 | Performed by: PHYSICIAN ASSISTANT

## 2024-10-23 PROCEDURE — 999N000226 HC STATISTIC SLP IP EVAL DEFER

## 2024-10-23 PROCEDURE — 93750 INTERROGATION VAD IN PERSON: CPT | Performed by: NURSE PRACTITIONER

## 2024-10-23 PROCEDURE — 250N000013 HC RX MED GY IP 250 OP 250 PS 637: Performed by: INTERNAL MEDICINE

## 2024-10-23 PROCEDURE — 87040 BLOOD CULTURE FOR BACTERIA: CPT | Performed by: NURSE PRACTITIONER

## 2024-10-23 PROCEDURE — 36415 COLL VENOUS BLD VENIPUNCTURE: CPT | Performed by: EMERGENCY MEDICINE

## 2024-10-23 PROCEDURE — 250N000013 HC RX MED GY IP 250 OP 250 PS 637: Performed by: STUDENT IN AN ORGANIZED HEALTH CARE EDUCATION/TRAINING PROGRAM

## 2024-10-23 PROCEDURE — 93306 TTE W/DOPPLER COMPLETE: CPT

## 2024-10-23 PROCEDURE — 70450 CT HEAD/BRAIN W/O DYE: CPT | Mod: MG

## 2024-10-23 PROCEDURE — 82330 ASSAY OF CALCIUM: CPT

## 2024-10-23 PROCEDURE — 250N000009 HC RX 250: Performed by: EMERGENCY MEDICINE

## 2024-10-23 PROCEDURE — 85025 COMPLETE CBC W/AUTO DIFF WBC: CPT | Performed by: EMERGENCY MEDICINE

## 2024-10-23 PROCEDURE — G1010 CDSM STANSON: HCPCS

## 2024-10-23 PROCEDURE — 82803 BLOOD GASES ANY COMBINATION: CPT

## 2024-10-23 PROCEDURE — 93283 PRGRMG EVAL IMPLANTABLE DFB: CPT | Performed by: INTERNAL MEDICINE

## 2024-10-23 PROCEDURE — 99222 1ST HOSP IP/OBS MODERATE 55: CPT | Mod: 24 | Performed by: NURSE PRACTITIONER

## 2024-10-23 PROCEDURE — 250N000013 HC RX MED GY IP 250 OP 250 PS 637

## 2024-10-23 PROCEDURE — 86850 RBC ANTIBODY SCREEN: CPT | Performed by: EMERGENCY MEDICINE

## 2024-10-23 PROCEDURE — 120N000005 HC R&B MS OVERFLOW UMMC

## 2024-10-23 PROCEDURE — G1010 CDSM STANSON: HCPCS | Performed by: RADIOLOGY

## 2024-10-23 PROCEDURE — 99291 CRITICAL CARE FIRST HOUR: CPT | Mod: 25 | Performed by: EMERGENCY MEDICINE

## 2024-10-23 PROCEDURE — 83605 ASSAY OF LACTIC ACID: CPT

## 2024-10-23 PROCEDURE — 93306 TTE W/DOPPLER COMPLETE: CPT | Mod: 26 | Performed by: INTERNAL MEDICINE

## 2024-10-23 PROCEDURE — G0463 HOSPITAL OUTPT CLINIC VISIT: HCPCS | Performed by: PHYSICIAN ASSISTANT

## 2024-10-23 PROCEDURE — 022N000001 HC SNF RUG CODE OPNP

## 2024-10-23 PROCEDURE — 99221 1ST HOSP IP/OBS SF/LOW 40: CPT | Mod: 24 | Performed by: STUDENT IN AN ORGANIZED HEALTH CARE EDUCATION/TRAINING PROGRAM

## 2024-10-23 PROCEDURE — 93010 ELECTROCARDIOGRAM REPORT: CPT | Performed by: EMERGENCY MEDICINE

## 2024-10-23 PROCEDURE — 82565 ASSAY OF CREATININE: CPT

## 2024-10-23 PROCEDURE — 87149 DNA/RNA DIRECT PROBE: CPT | Performed by: NURSE PRACTITIONER

## 2024-10-23 PROCEDURE — 70498 CT ANGIOGRAPHY NECK: CPT | Mod: 26 | Performed by: RADIOLOGY

## 2024-10-23 PROCEDURE — 93005 ELECTROCARDIOGRAM TRACING: CPT | Performed by: EMERGENCY MEDICINE

## 2024-10-23 PROCEDURE — 250N000013 HC RX MED GY IP 250 OP 250 PS 637: Performed by: NURSE PRACTITIONER

## 2024-10-23 PROCEDURE — 99285 EMERGENCY DEPT VISIT HI MDM: CPT | Mod: 25 | Performed by: EMERGENCY MEDICINE

## 2024-10-23 PROCEDURE — 93750 INTERROGATION VAD IN PERSON: CPT | Performed by: PHYSICIAN ASSISTANT

## 2024-10-23 RX ORDER — LIDOCAINE 40 MG/G
CREAM TOPICAL
Status: DISCONTINUED | OUTPATIENT
Start: 2024-10-23 | End: 2024-11-09 | Stop reason: HOSPADM

## 2024-10-23 RX ORDER — MAGNESIUM OXIDE 400 MG/1
400 TABLET ORAL DAILY
Status: DISCONTINUED | OUTPATIENT
Start: 2024-10-24 | End: 2024-11-09 | Stop reason: HOSPADM

## 2024-10-23 RX ORDER — WARFARIN SODIUM 2.5 MG/1
2.5 TABLET ORAL
Status: COMPLETED | OUTPATIENT
Start: 2024-10-23 | End: 2024-10-23

## 2024-10-23 RX ORDER — ESCITALOPRAM OXALATE 10 MG/1
10 TABLET ORAL DAILY
Status: DISCONTINUED | OUTPATIENT
Start: 2024-10-23 | End: 2024-11-09 | Stop reason: HOSPADM

## 2024-10-23 RX ORDER — AMIODARONE HYDROCHLORIDE 200 MG/1
200 TABLET ORAL DAILY
Status: DISCONTINUED | OUTPATIENT
Start: 2024-10-23 | End: 2024-11-09 | Stop reason: HOSPADM

## 2024-10-23 RX ORDER — MULTIPLE VITAMINS W/ MINERALS TAB 9MG-400MCG
1 TAB ORAL 2 TIMES DAILY
Status: DISCONTINUED | OUTPATIENT
Start: 2024-10-23 | End: 2024-10-23

## 2024-10-23 RX ORDER — METHOCARBAMOL 750 MG/1
750 TABLET, FILM COATED ORAL 3 TIMES DAILY PRN
Status: DISCONTINUED | OUTPATIENT
Start: 2024-10-23 | End: 2024-11-09 | Stop reason: HOSPADM

## 2024-10-23 RX ORDER — WARFARIN SODIUM 2.5 MG/1
2.5 TABLET ORAL
Status: DISCONTINUED | OUTPATIENT
Start: 2024-10-23 | End: 2024-10-23 | Stop reason: HOSPADM

## 2024-10-23 RX ORDER — ACETAMINOPHEN 325 MG/1
650 TABLET ORAL EVERY 4 HOURS PRN
Status: DISCONTINUED | OUTPATIENT
Start: 2024-10-23 | End: 2024-11-09 | Stop reason: HOSPADM

## 2024-10-23 RX ORDER — ACETAMINOPHEN 650 MG/1
650 SUPPOSITORY RECTAL EVERY 4 HOURS PRN
Status: DISCONTINUED | OUTPATIENT
Start: 2024-10-23 | End: 2024-10-23

## 2024-10-23 RX ORDER — WARFARIN SODIUM 2.5 MG/1
2.5 TABLET ORAL
Status: DISCONTINUED | OUTPATIENT
Start: 2024-10-23 | End: 2024-10-23

## 2024-10-23 RX ORDER — IOPAMIDOL 755 MG/ML
67 INJECTION, SOLUTION INTRAVASCULAR ONCE
Status: DISCONTINUED | OUTPATIENT
Start: 2024-10-23 | End: 2024-10-23 | Stop reason: HOSPADM

## 2024-10-23 RX ORDER — ATORVASTATIN CALCIUM 80 MG/1
80 TABLET, FILM COATED ORAL EVERY EVENING
Status: DISCONTINUED | OUTPATIENT
Start: 2024-10-23 | End: 2024-11-09 | Stop reason: HOSPADM

## 2024-10-23 RX ORDER — ATORVASTATIN CALCIUM 40 MG/1
80 TABLET, FILM COATED ORAL DAILY
Status: DISCONTINUED | OUTPATIENT
Start: 2024-10-23 | End: 2024-10-23

## 2024-10-23 RX ORDER — IOPAMIDOL 755 MG/ML
67 INJECTION, SOLUTION INTRAVASCULAR ONCE
Status: COMPLETED | OUTPATIENT
Start: 2024-10-23 | End: 2024-10-23

## 2024-10-23 RX ORDER — HYDRALAZINE HYDROCHLORIDE 25 MG/1
25 TABLET, FILM COATED ORAL 3 TIMES DAILY
Status: DISCONTINUED | OUTPATIENT
Start: 2024-10-23 | End: 2024-10-23

## 2024-10-23 RX ORDER — GABAPENTIN 100 MG/1
100 CAPSULE ORAL AT BEDTIME
Status: DISCONTINUED | OUTPATIENT
Start: 2024-10-23 | End: 2024-11-09 | Stop reason: HOSPADM

## 2024-10-23 RX ORDER — MULTIPLE VITAMINS W/ MINERALS TAB 9MG-400MCG
1 TAB ORAL DAILY
Status: DISCONTINUED | OUTPATIENT
Start: 2024-10-24 | End: 2024-11-09 | Stop reason: HOSPADM

## 2024-10-23 RX ORDER — WARFARIN SODIUM 2.5 MG/1
2.5 TABLET ORAL DAILY
Status: DISCONTINUED | OUTPATIENT
Start: 2024-10-23 | End: 2024-10-23

## 2024-10-23 RX ORDER — LOSARTAN POTASSIUM 50 MG/1
50 TABLET ORAL 2 TIMES DAILY
Status: DISCONTINUED | OUTPATIENT
Start: 2024-10-23 | End: 2024-11-09 | Stop reason: HOSPADM

## 2024-10-23 RX ORDER — FLUDROCORTISONE ACETATE 0.1 MG/1
0.1 TABLET ORAL DAILY
Status: DISCONTINUED | OUTPATIENT
Start: 2024-10-23 | End: 2024-10-28

## 2024-10-23 RX ADMIN — SODIUM CHLORIDE, PRESERVATIVE FREE 90 ML: 5 INJECTION INTRAVENOUS at 09:34

## 2024-10-23 RX ADMIN — LOSARTAN POTASSIUM 50 MG: 50 TABLET, FILM COATED ORAL at 23:21

## 2024-10-23 RX ADMIN — ATORVASTATIN CALCIUM 80 MG: 80 TABLET, FILM COATED ORAL at 20:13

## 2024-10-23 RX ADMIN — GABAPENTIN 100 MG: 100 CAPSULE ORAL at 23:04

## 2024-10-23 RX ADMIN — ESCITALOPRAM OXALATE 10 MG: 10 TABLET ORAL at 16:54

## 2024-10-23 RX ADMIN — FLUDROCORTISONE ACETATE 0.1 MG: 0.1 TABLET ORAL at 17:00

## 2024-10-23 RX ADMIN — IOPAMIDOL 67 ML: 755 INJECTION, SOLUTION INTRAVENOUS at 09:34

## 2024-10-23 RX ADMIN — HYDRALAZINE HYDROCHLORIDE 75 MG: 50 TABLET ORAL at 05:53

## 2024-10-23 RX ADMIN — PANTOPRAZOLE SODIUM 40 MG: 40 TABLET, DELAYED RELEASE ORAL at 06:02

## 2024-10-23 RX ADMIN — AMIODARONE HYDROCHLORIDE 200 MG: 200 TABLET ORAL at 16:54

## 2024-10-23 RX ADMIN — WARFARIN SODIUM 2.5 MG: 2.5 TABLET ORAL at 20:13

## 2024-10-23 RX ADMIN — Medication 5 MG: at 23:21

## 2024-10-23 ASSESSMENT — ACTIVITIES OF DAILY LIVING (ADL)
ADLS_ACUITY_SCORE: 0
ADLS_ACUITY_SCORE: 41
ADLS_ACUITY_SCORE: 0

## 2024-10-23 ASSESSMENT — PAIN SCALES - GENERAL: PAINLEVEL_OUTOF10: NO PAIN (0)

## 2024-10-23 NOTE — PHARMACY
Pharmacy Stroke Code Response    Pharmacist responded as part of the Stroke Code Team activation to patient care area ED. The Stroke Team determined that the patient was not a candidate for IV thrombolytic therapy and the pharmacy team was dismissed at 9:30.    Jt Paige RPH

## 2024-10-23 NOTE — PROGRESS NOTES
Rapid Response Epic Documentation     Situation:      [facility] called to 3rd floor room 3.91 Cardiology.    Objective:      Pt has an LVAD. During appointment complained of right arm weakness. Initial MAP was in the low 40's. Provider called RRT and 911 for transport.    Assessment:          IV attempted x1 prior to ems arrival without success. EMS arrived.    BP:  MAP 48    Pulse: na  Respiration: 16  Glucose: na mg/dl  Mental Status: Alert  CMS: Intact  Stroke Scale: Not Applicable  EKG: Not Performed          Location:          Disposition:      Transfer to Emergency Room Via: 911    Protocol Used:     Hypotension

## 2024-10-23 NOTE — DISCHARGE SUMMARY
[facility]  Hospitalist Discharge Summary      Date of Admission:  10/17/2024  Date of Discharge:  10/23/2024  9:25 AM  Discharging Provider: MARINA BUTLER MD  Discharge Service: Hospitalist Service    Discharge Diagnoses   # Hypotension given low maps  # Acute on chronic systolic heart failure secondary to ICM   # s/p HM3 LVAD as DT on 9/18/2024  # CAD s/p PCI  # History of STEMI  # s/p ICD 5/2024  # Acute Angle of Outflow Graft    Clinically Significant Risk Factors          Follow-ups Needed After Discharge       Unresulted Labs Ordered in the Past 30 Days of this Admission       Date and Time Order Name Status Description    9/18/2024  6:49 PM Prepare pheresed platelets (unit) Preliminary             Discharge Disposition   Transferred to [facility]  Condition at discharge: Serious    Hospital Course   Duane C Johnson is a 74 year old male pmhx of anterior STEMI s/p PCI to the pLAD on 10/26/23 with a VT/VF arrest the next day (10/27) Placed on amiodarone and discharged 11/03/23, ICD was not indicated as this was within 48h of his MI. His EF prior to discharge was 20-30% with a large area of akinesis in the LAD, placed on apixaban due to this (Apixaban dcd on 7/5/24). Had multiple admissions for HF exacerbation, admitted on 8/30/24 for CHF exacerbation with BNP 16k. CPX and RHC showed significant functional limitations due to heart failure. Underwent HM3 LVAD placement on 9/18/24 and required mediastinal re-exploration for post operative hemorrhage.   Transferred to ARU on 10/5 for rehab. Unable to progress with therapies and transitioned to TCU on 10/17.  During stay in the TCU, patient was noted to have increasing alarms.  Several medication changes were made per direction of cardiology.  On 10/23/2024, patient presented to his cardiology clinic today was noted to have low maps in addition to symptoms concerning for stroke.  Patient was transferred to the [facility] ER for further  Reading Physician Reading Date Result Priority   Manish Armenta MD 6/4/2020 Routine      Result Text     · Left ventricular systolic function is normal. Calculated EF = 62.3%. Estimated EF was in agreement with the calculated EF. Normal left ventricular cavity size and wall thickness noted. All left ventricular wall segments contract normally.  · Left ventricular diastolic function is normal.  · There is mild calcification of the aortic valve.  · Mild aortic valve regurgitation is present.  · Moderate MAC is present.  · Mild mitral valve regurgitation is present.  · Mild tricuspid valve regurgitation is present. Estimated right ventricular systolic pressure from tricuspid regurgitation is normal (<35 mmHg).        workup.    Consultations This Hospital Stay   NUTRITION SERVICES ADULT IP CONSULT  CARE MANAGEMENT / SOCIAL WORK IP CONSULT    Code Status   Full Code    Time Spent on this Encounter   I, MARINA BUTLER MD, discharged this patient today but I did not personally see the patient today and will not be billing for the patient's discharge.       MARINA BUTLER MD  [facility] TRANSITIONAL [facility]ARE [address] [address]  Phone: [phone]  ______________________________________________________________________    Physical Exam   Vital Signs: Temp: 97.9  F (36.6  C) Temp src: Oral BP: 93/77 Pulse: 70   Resp: 18 SpO2: 97 % O2 Device: Nasal cannula    Weight: 136 lbs 14.4 oz    Did not formally see patient on the day of discharge.       Primary Care Physician   Jose Coles    Discharge Orders   No discharge procedures on file.    Significant Results and Procedures   Most Recent 3 CBC's:  Recent Labs   Lab Test 10/23/24  0926 10/21/24  0548 10/17/24  0629   WBC 8.2 7.2 7.1   HGB 11.4*  11.6* 10.8* 10.3*   MCV 96 95 96    205 200     Most Recent 3 BMP's:  Recent Labs   Lab Test 10/24/24  0626 10/24/24  0534 10/23/24  2233 10/23/24  0927 10/23/24  0926 10/23/24  0917 10/21/24  0548   NA  --  131*  --   --  129*  132*  --  132*   POTASSIUM  --  4.2  --   --  3.8  4.5  --  4.3   CHLORIDE  --  99  --   --  94*  --  94*   CO2  --  26  --   --  23  --  28   BUN  --  23.9*  --   --  24.4*  --  23.6*   CR  --  0.68  --  0.6* 0.77  --  0.67   ANIONGAP  --  6*  --   --  12  --  10   PRANAV  --  8.5*  --   --  8.6*  --  8.7*   GLC 86 91 109*  --  97  108*   < > 97    < > = values in this interval not displayed.     Most Recent 2 LFT's:  Recent Labs   Lab Test 10/09/24  1928 09/27/24  0441   AST 32 34   ALT 31 28   ALKPHOS 113 76   BILITOTAL 0.8 0.3     Most Recent 3 INR's:  Recent Labs   Lab Test 10/24/24  0534 10/23/24  0804 10/22/24  0712   INR 2.42* 2.26* 2.30*   ,   Results  for orders placed or performed during the hospital encounter of 10/23/24   CT Head w/o Contrast    Narrative    CT HEAD W/O CONTRAST 10/23/2024 9:46 AM    History: Stroke.     Comparison: 10/9/2024     Technique: Using multidetector thin collimation helical acquisition  technique, axial, coronal and sagittal CT images from the skull base  to the vertex were obtained without intravenous contrast.     Findings: There is no intracranial hemorrhage, mass effect or midline  shift. There is no focal loss of gray-white matter differentiation,  insular ribbon sign, or focally hyperdense artery to suggest acute  infarction. The ventricles are proportionate to the cerebral sulci.  Mild generalized parenchymal atrophy. Mild patchy hypoattenuation of  the periventricular white matter which is nonspecific but favored to  represent chronic small vessel ischemic disease given patient's age.  Unchanged slightly asymmetric right frontal extra-axial space compared  to the left. Subtle extra-axial hyperdensity on series 4 image 22 is  favored artifactual.    The bony calvaria and the bones of the skull base appear normal.  Chronic opacification of the left maxillary sinus. The mastoid air  cells are clear.       Impression    Impression:   1. No acute intracranial pathology.  2. ASPECT Score: 10/10.    [Stroke Code Result: Negative]    Finding was identified on 10/23/2024 9:56 AM.     Dr. Juan Diego Shin was contacted by Dr. Geovanny Mayes on 10/23/2024 9:57  AM and verbalized understanding of the result.    P Stroke Communication Guidelines:  [place] ED: [phone] (EB ED)  [place] Inpatient: [phone] ext. [phone] (Stroke Ascom)  [facility] ED or Inpatient: [phone] (WB ED)     I have personally reviewed the examination and initial interpretation  and I agree with the findings.    SJ SAUCEDA MD         SYSTEM ID:  [id number]   CTA Head Neck with Contrast    Narrative    EXAM: CTA HEAD NECK W CONTRAST  10/23/2024 9:46 AM      HISTORY:  stroke code, right arm weakness       COMPARISON:  Head CT 10/9/2024, CTA 9/29/2024    TECHNIQUE:    HEAD and NECK CTA: During rapid bolus intravenous injection of  nonionic contrast material, axial images were obtained using thin  collimation multidetector helical technique from the base of the upper  aortic arch through the [place]. This CT angiogram data was  reconstructed at thin intervals with mild overlap. Images were sent to  the 3D workstation, and 3D reconstructions were obtained. The axial  source images, multiplanar reformations, 3D reconstructions in both  maximum intensity projection display and volume rendered models were  reviewed, with reconstructions performed by the technologist.    CONTRAST: 67 mL [id number]    FINDINGS:  Head CTA demonstrates no intracranial arterial aneurysm or stenosis.    Neck CTA demonstrates conventional aortic arch branching pattern and  patent great vessel origins. The normal distal right internal carotid  artery measures 5 mm. The normal distal left internal carotid artery  measures 5 mm. No vertebral artery stenosis. Mild atherosclerotic  calcification of the left carotid bulb.    No acute finding in the visualized neck soft tissues. Left greater  than right pleural effusions and diffuse interlobular septal  thickening.      Impression    IMPRESSION:    1. Head CTA demonstrates no aneurysm or stenosis of the major  intracranial arteries.   2. Neck CTA demonstrates no stenosis of the major cervical arteries.    I have personally reviewed the examination and initial interpretation  and I agree with the findings.    SJ SAUCEDA MD         SYSTEM ID:  [id number]   Echocardiogram Complete - For age > 60 yrs     Value    LVEF  <20%    Narrative    [id number]  [facility]ly [facility]  [id number]  [id number]^DENISE^COLTILDE     [facility]  Echocardiography Laboratory  [address] [place] [id number]     Name: JOHNSON, DUANE C  MRN:  [id number]  : 1950  Study Date: 10/23/2024 10:03 AM  Age: 74 yrs  Gender: Male  Patient Location: [facility]  Reason For Study: Cerebrovascular Incident  Ordering Physician: CLOTILDE WALKER  Performed By: Luther Tidwell     BSA: 1.7 m2  Height: 66 in  Weight: 136 lb  ______________________________________________________________________________  Procedure  LVAD Echocardiogram with two-dimensional, color and spectral Doppler  performed.  ______________________________________________________________________________  Interpretation Summary  LVAD cannula was seen in the expected anatomic position in the LV apex.  HM3 at 5100RPM.  LVIDd 47mm.  Septum normal.  Aortic valve open intermittently. No AI.  Normal flow velocities.  Global right ventricular function is mildly to moderately reduced.  Small circumferential pericardial effusion is present without any hemodynamic  significance.  ______________________________________________________________________________  Left Ventricle  The visual ejection fraction is <20%. LVAD cannula was seen in the expected  anatomic position in the LV apex. HM3 at 5100RPM.  LVIDd 47mm.  Septum normal.  Aortic valve open intermittently. No AI.  Normal flow velocities.     Right Ventricle  Global right ventricular function is mildly to moderately reduced. A pacemaker  lead is noted in the right ventricle.     Vessels  IVC diameter >2.1 cm collapsing <50% with sniff suggests a high RA pressure  estimated at 15 mmHg or greater.     Pericardium  Small circumferential pericardial effusion is present without any hemodynamic  significance.  ______________________________________________________________________________  MMode/2D Measurements & Calculations     IVSd: 1.3 cm  LVIDd: 4.7 cm  LVIDs: 4.0 cm  LVPWd: 1.3 cm  FS: 14.8 %  LV mass(C)d: 230.6 grams  LV mass(C)dI: 135.8 grams/m2  RWT: 0.55     Doppler Measurements & Calculations  TR max francisco j: 226.4 cm/sec  TR max P.5 mmHg      ______________________________________________________________________________  Report approved by: Eusebio Terry 10/23/2024 11:58 AM             Discharge Medications   Cannot display discharge medications since this patient is expected but has not yet arrived.    Allergies   Allergies   Allergen Reactions    Brilinta [Ticagrelor] Other (See Comments) and Difficulty breathing     Per pt and spouse, hyperventilation.    Clonazepam Other (See Comments)     Per spouse, acted like a zombie and he was shaky, could barely talk.

## 2024-10-23 NOTE — PROGRESS NOTES
ANTICOAGULATION  MANAGEMENT: Discharge Review    Duane C Johnson chart reviewed for anticoagulation continuity of care    Emergency room visit on 10/23 for right arm weakness, hypotension .    Discharge disposition: Staying locally at [facility]     Results:    Recent labs: (last 7 days)     10/17/24  0629 10/18/24  0651 10/19/24  0555 10/20/24  0750 10/21/24  0548 10/22/24  0712 10/23/24  0804   INR 2.67* 2.42* 2.35* 2.40* 2.40* 2.30* 2.26*     Anticoagulation inpatient management:     NA - nothing given in ED    Anticoagulation discharge instructions:     Warfarin dosing:  see hospital notes    Bridging: No   INR goal change: No      Medication changes affecting anticoagulation: No    Additional factors affecting anticoagulation: No     PLAN     No adjustment to anticoagulation plan needed    Patient not contacted - pt had been in TCU, but then was transferred to the ED following concerning stroke symptoms while being seen by cardiology on 10/23. From ED, pt was admitted     No adjustment to Anticoagulation Calendar was required    Suly Guerrero RN  10/23/2024  Anticoagulation Clinic  [facility] for routing messages: susannah MARTINEZ LVAD  [facility] patient phone line: [phone]

## 2024-10-23 NOTE — PROGRESS NOTES
HPI:   Duane C Johnson is a 74 year old male pmhx of anterior STEMI s/p PCI to the pLAD on 10/26/23 with a VT/VF arrest the next day (10/27) with patent stents and unchanged anatomy on repeat angiogram. Placed on amiodarone and discharged 11/03/23, ICD was not indicated as this was within 48h of his MI. His EF prior to discharge was 20-30% with a large area of akinesis in the LAD, placed on apixaban due to this (Apixaban dcd on 7/5/24). Has had multiple admissions for HF exacerbation last year.  Admitted on 8/30/24 for CHF exacerbation with BNP 16k. CPX and RHC showed significant functional limitations due to heart failue. Underwent HM3 LVAD on 9/18/24. Discharged to ARU on 10/5/24.     In reviewing the TCU chart, MAPS have been ranging from 60-84 with well controled Hrs in the 50s-60s. For afterload his hydralazine was decreased yesterday from 100 mg q8h to 75 mg every 8 hours. He is still getting losartan 50 mg BID, although one dose was held yesterday.     Today  Today he is feeling dizzy. It started in the transport coming over. Now he is feeling nausous. Has not had a low flow alarm in 4 days. He's not sure what he is doing with therapy.     During our appointment he started complainin of new right arm weakness. No numbess or tinging. He states he did have blurry vision which is resolved.     PAST MEDICAL HISTORY:  Past Medical History:   Diagnosis Date    Benign essential hypertension     CAD (coronary artery disease)     Chronic systolic heart failure (H)     Hypertension     ST elevation myocardial infarction (STEMI), unspecified artery (H)     Ventricular fibrillation (H)        FAMILY HISTORY:  Family History   Problem Relation Age of Onset    Other Cancer Mother     Obesity Mother     GI problems Father     Uterine Cancer Sister        SOCIAL HISTORY:  Social History     Socioeconomic History    Marital status:    Tobacco Use    Smoking status: Former     Current packs/day: 0.00     Average  packs/day: 0.3 packs/day for 30.0 years (7.5 ttl pk-yrs)     Types: Cigarettes     Start date: 10/26/1993     Quit date: 10/26/2023     Years since quittin.9     Passive exposure: Past    Smokeless tobacco: Never    Tobacco comments:     Quit 10/26/2023   Vaping Use    Vaping status: Never Used   Substance and Sexual Activity    Alcohol use: Yes     Comment: 1-2 beers per day    Drug use: No    Sexual activity: Yes     Partners: Female     Birth control/protection: None   Other Topics Concern    Parent/sibling w/ CABG, MI or angioplasty before 65F 55M? No     Social Drivers of Health     Financial Resource Strain: Low Risk  (2024)    Financial Resource Strain     Within the past 12 months, have you or your family members you live with been unable to get utilities (heat, electricity) when it was really needed?: No   Food Insecurity: Low Risk  (2024)    Food Insecurity     Within the past 12 months, did you worry that your food would run out before you got money to buy more?: No     Within the past 12 months, did the food you bought just not last and you didn t have money to get more?: No   Transportation Needs: Low Risk  (2024)    Transportation Needs     Within the past 12 months, has lack of transportation kept you from medical appointments, getting your medicines, non-medical meetings or appointments, work, or from getting things that you need?: No   Physical Activity: Inactive (2023)    Exercise Vital Sign     Days of Exercise per Week: 0 days     Minutes of Exercise per Session: 0 min   Social Connections: Unknown (2023)    Social Connection and Isolation Panel [NHANES]     Marital Status:    Interpersonal Safety: High Risk (10/17/2024)    Interpersonal Safety     Do you feel physically and emotionally safe where you currently live?: No     Within the past 12 months, have you been hit, slapped, kicked or otherwise physically hurt by someone?: No     Within the past 12 months,  have you been humiliated or emotionally abused in other ways by your partner or ex-partner?: No   Housing Stability: High Risk (9/1/2024)    Housing Stability     Do you have housing? : No     Are you worried about losing your housing?: No       CURRENT MEDICATIONS:  No current facility-administered medications for this visit.     No current outpatient medications on file.     Facility-Administered Medications Ordered in Other Visits   Medication Dose Route Frequency Provider Last Rate Last Admin    - Skin Test Reading -   Does not apply Q21 Days Abiola Schilling MD        acetaminophen (TYLENOL) tablet 975 mg  975 mg Oral Q8H PRN Abiola Schilling MD   975 mg at 10/21/24 2205    amiodarone (PACERONE) tablet 200 mg  200 mg Oral Daily Abiola Schilling MD   200 mg at 10/22/24 0822    atorvastatin (LIPITOR) tablet 80 mg  80 mg Oral QPM Abiola Schilling MD   80 mg at 10/22/24 2135    bisacodyl (DULCOLAX) suppository 10 mg  10 mg Rectal Daily PRN Abiola Schilling MD        COVID-19 mRNA vaccine 12+y (COMIRNATY (PFIZER_) injection 30 mcg  30 mcg Intramuscular Once Sam Tolentino MD        diphenhydrAMINE (BENADRYL) capsule 50 mg  50 mg Oral Once PRN Sam Tolentino MD        Or    diphenhydrAMINE (BENADRYL) injection 50 mg  50 mg IV/IM Once PRN Sam Tolentino MD        EPINEPHrine (ADRENALIN) kit 0.3 mg  0.3 mg Intramuscular Q5 Min PRN Sam Tolentino MD        escitalopram (LEXAPRO) tablet 10 mg  10 mg Oral or Feeding Tube Daily Abiola Schilling MD   10 mg at 10/22/24 0822    fludrocortisone (FLORINEF) tablet 0.1 mg  0.1 mg Oral Daily Tamiko Moreira APRN CNP   0.1 mg at 10/22/24 0822    gabapentin (NEURONTIN) capsule 100 mg  100 mg Oral or Feeding Tube At Bedtime Abiola Schilling MD   100 mg at 10/22/24 2135    hydrALAZINE (APRESOLINE) tablet 75 mg  75 mg Oral Q8H KATIUSKA Arias, Sirisha K, APRN CNP   75 mg at 10/23/24 0553    lidocaine (LMX4) cream   Topical Q1H PRN Abiola Schilling MD        lidocaine 1 % 0.1-1 mL   0.1-1 mL Other Q1H PRN Abiola Schilling MD        losartan (COZAAR) tablet 50 mg  50 mg Oral BID Tamiko Moreira APRN CNP   50 mg at 10/22/24 2135    magnesium oxide (MAG-OX) tablet 400 mg  400 mg Oral Daily Abiola Schilling MD   400 mg at 10/22/24 0821    melatonin tablet 10 mg  10 mg Oral At Bedtime Abiola Schilling MD   10 mg at 10/22/24 2135    methocarbamol (ROBAXIN) tablet 750 mg  750 mg Oral TID Abiola Schilling MD   750 mg at 10/22/24 1909    multivitamin w/minerals (THERA-VIT-M) tablet 1 tablet  1 tablet Oral Daily Abiola Schilling MD   1 tablet at 10/22/24 0822    naloxone (NARCAN) injection 0.2 mg  0.2 mg Intravenous Q2 Min PRN Abiola Schilling MD        Or    naloxone (NARCAN) injection 0.4 mg  0.4 mg Intravenous Q2 Min PRN Abiola Schilling MD        Or    naloxone (NARCAN) injection 0.2 mg  0.2 mg Intramuscular Q2 Min PRN Abiola Schilling MD        Or    naloxone (NARCAN) injection 0.4 mg  0.4 mg Intramuscular Q2 Min PRN Abiola Schilling MD        Nurse may request from Pharmacy a change of form of medication (e.g. Liquid to tablet).   Does not apply Continuous PRN Abiola Schilling MD        ondansetron (ZOFRAN ODT) ODT tab 4 mg  4 mg Oral Q6H PRN Abiola Schilling MD        Or    ondansetron (ZOFRAN) injection 4 mg  4 mg Intravenous Q6H PRN Abiola Schilling MD        oxyCODONE (ROXICODONE) tablet 5 mg  5 mg Oral Q4H PRN Abiola Schilling MD        pantoprazole (PROTONIX) EC tablet 40 mg  40 mg Oral QAM AC Abiola Schilling MD   40 mg at 10/23/24 0602    Patient is already receiving anticoagulation with heparin, enoxaparin (LOVENOX), warfarin (COUMADIN)  or other anticoagulant medication   Does not apply Continuous PRN Abiola Schilling MD        polyethylene glycol (MIRALAX) Packet 17 g  17 g Oral or Feeding Tube BID PRAbiola Howe MD        prochlorperazine (COMPAZINE) injection 5 mg  5 mg Intravenous Q6H PRAbiola Howe MD        Or    prochlorperazine (COMPAZINE) tablet 5 mg  5 mg Oral Q6H  PRN Abiola Schilling MD        Or    prochlorperazine (COMPAZINE) suppository 12.5 mg  12.5 mg Rectal Q12H PRN Abiola Schilling MD        senna-docusate (SENOKOT-S/PERICOLACE) 8.6-50 MG per tablet 2 tablet  2 tablet Oral or Feeding Tube BID PRN Abiola Schilling MD        sodium chloride (PF) 0.9% PF flush 3 mL  3 mL Intracatheter q1 min prn Abiola Schilling MD        tuberculin injection 5 Units  5 Units Intradermal Q21 Days Abiola Schilling MD        Warfarin Dose Required Daily - Pharmacist Managed  1 each Oral See Admin Instructions Abiola Schilling MD           ROS:   See HPI    EXAM:  BP (!) 46/0   SpO2 98%     GENERAL: Appears pale, talkative, sitting in a wheelchair. In no obvious pain  HEENT: Eye symmetrical and without discharge or icterus bilaterally.   CV: Hum of LVAD, no adventitious sounds  RESPIRATORY: Respirations regular, even, and unlabored. Lungs CTA throughout.   GI: Soft and non distended   EXTREMITIES: No peripheral edema. Non- pulsatile.   NEUROLOGIC: Alert and orientated x 3.  New right sided pronator drift. Otherwise cranial nerves 2-12 intact. 4/5 right upper extremity strength. 5/5 left upper extremity strength. 5/5 b/l lower extremity strenght  MUSCULOSKELETAL: No joint swelling or tenderness.   SKIN: No jaundice. No rashes or lesions. Driveline dressing no assessed.    Labs - as reviewed in clinic with patient today:  CBC RESULTS:  Lab Results   Component Value Date    WBC 7.2 10/21/2024    WBC 12.2 (H) 05/26/2021    RBC 3.38 (L) 10/21/2024    RBC 3.60 (L) 05/26/2021    HGB 10.8 (L) 10/21/2024    HGB 12.0 (L) 05/26/2021    HCT 32.0 (L) 10/21/2024    HCT 34.8 (L) 05/26/2021    MCV 95 10/21/2024    MCV 97 05/26/2021    MCH 32.0 10/21/2024    MCH 33.3 (H) 05/26/2021    MCHC 33.8 10/21/2024    MCHC 34.5 05/26/2021    RDW 16.9 (H) 10/21/2024    RDW 13.2 05/26/2021     10/21/2024     05/26/2021       CMP RESULTS:  Lab Results   Component Value Date     (L) 10/21/2024    NA  138 10/27/2023     05/26/2021    POTASSIUM 4.3 10/21/2024    POTASSIUM 2.5 (LL) 09/23/2024    POTASSIUM 4.5 05/26/2021    CHLORIDE 94 (L) 10/21/2024    CHLORIDE 103 10/27/2023    CHLORIDE 105 05/26/2021    CO2 28 10/21/2024    CO2 27 10/27/2023    CO2 29 05/26/2021    ANIONGAP 10 10/21/2024    ANIONGAP 7 05/26/2021    GLC 97 10/21/2024    GLC 95 10/09/2024     (H) 05/26/2021    BUN 23.6 (H) 10/21/2024    BUN 41 (H) 05/26/2021    CR 0.67 10/21/2024    CR 0.80 05/26/2021    GFRESTIMATED >90 10/21/2024    GFRESTIMATED >60 10/26/2023    GFRESTIMATED 90 05/26/2021    GFRESTBLACK >90 05/26/2021    PRANAV 8.7 (L) 10/21/2024    PRANAV 8.3 (L) 05/26/2021    BILITOTAL 0.8 10/09/2024    BILITOTAL 0.5 03/20/2018    ALBUMIN 2.6 (L) 10/09/2024    ALBUMIN 3.7 03/20/2018    ALKPHOS 113 10/09/2024    ALKPHOS 82 03/20/2018    ALT 31 10/09/2024    ALT 22 03/20/2018    AST 32 10/09/2024    AST 20 03/20/2018        INR RESULTS:  Lab Results   Component Value Date    INR 2.26 (H) 10/23/2024    INR 1.15 (H) 05/26/2021       Lab Results   Component Value Date    MAG 1.9 10/06/2024     Lab Results   Component Value Date    NTBNPI 22,172 (H) 09/04/2024     Lab Results   Component Value Date    NTBNP 15,659 (H) 08/29/2024       Assessment and Plan:   Duane C Johnson is a 74 year old male pmhx of anterior STEMI s/p PCI to the pLAD on 10/26/23 with a VT/VF arrest the next day (10/27) with patent stents and unchanged anatomy on repeat angiogram. Placed on amiodarone and discharged 11/03/23, ICD was not indicated as this was within 48h of his MI. His EF prior to discharge was 20-30% with a large area of akinesis in the LAD, placed on apixaban due to this (Apixaban dcd on 7/5/24). Has had multiple admissions for HF exacerbation last year.  Admitted on 8/30/24 for CHF exacerbation with BNP 16k. CPX and RHC showed significant functional limitations due to heart failue. Underwent HM3 LVAD on 9/18/24. Discharged to ARU on 10/5/24.     He  presents today in the setting of frequent low-flow alarms, laibile BPS and intermittent dizziness.    He was dizzy on arrival, started in transport on the way over. Right arm symptoms started at ~8:45 am- with subjective weakness and was found to have a new pronator drift at that point. Repeat Map was ranging 45-48.    Note that he did have a stroke code called about 2 weeks ago for b/l arm tingling/weakness and MAP was very low at that time. Most likely this is all hypotension related, but given new neurologic findings will send to ER for full stroke code and if negative likely admission to cards 2.     For a problem based review of his recent history, please see my consult note from 10/17.    Recommendations  - 911 transfer to the [place], stroke code  - Likely will need admission with cards 2 to optimize bp medication and fluid management given his alarms and dizziness are impairing his participation with rehab- discussed with Dr. Asencio.   - Getting IV in clinic to start IV fluids  - Discussed with TCU hospitalist MD as well- they are awre of transfer and will wait to hear more regarding admisison  - LVAD coordinator to call patient's wife to give an update    Billing  - I managed a complication of management  - I discussed care with multiple other providers (ER doctor, Dr. Asencio, TCU doctor)  - I made a decision regarding hospitalizatoin      Torrie Forrest PA-C  [facility]  Advanced Heart Failure          CC

## 2024-10-23 NOTE — ED PROVIDER NOTES
ED Provider Note  [facility]      History     Chief Complaint   Patient presents with    Stroke Symptoms     HPI  Duane C Johnson is a 74 year old male with a history of anterior STEMI s/p PCI to the pLAD on 10/26/23 with a VT/VF arrest the next day (10/27/2023) , CHF (EF:<20% on 10/18/24) with LVAD placed on 9/18/2024, and atrial fibrillation on warfarin who presents to the emergency department via EMS from clinic with right-sided weakness and hypotension.  He was having a routine cardiology checkup today when he began experiencing right sided arm weakness at 8:40 a.m.  Per clinic, he was found to have MAPs of 38, but then improved to 46 on recheck.  His INR was found to be 2.26.     Per EMS, he did not receive IV fluids in clinic, but was started on them in ambulance.  Patient reports that his weakness is feeling a bit better now, but when he is standing the weakness gets worse.  He denies syncope or near syncope.  Denies black, tarry, or bloody stools.  He denies chest pain, but endorses slight difficulty breathing.  He did take his hydralazine and losartan this morning.    Per chart review, MAPs have been ranging from 68-84 with well-controlled heart rates in the 50s/60s.  For afterload his hydralazine was decreased yesterday from 100 mg q8h to 75 mg every 8 hours. He is still taking losartan 50 mg twice daily, although 1 dose was held yesterday.    Past Medical History  Past Medical History:   Diagnosis Date    Benign essential hypertension     CAD (coronary artery disease)     Chronic systolic heart failure (H)     Hypertension     ST elevation myocardial infarction (STEMI), unspecified artery (H)     Ventricular fibrillation (H)      Past Surgical History:   Procedure Laterality Date    ANESTHESIA CARDIOVERSION N/A 9/5/2024    Procedure: Anesthesia cardioversion;  Surgeon: GENERIC ANESTHESIA PROVIDER;  Location: [facility]    ANESTHESIA CARDIOVERSION N/A 9/30/2024    Procedure:  Anesthesia cardioversion;  Surgeon: GENERIC ANESTHESIA PROVIDER;  Location: [facility]    COLONOSCOPY N/A 3/23/2023    Procedure: COLONOSCOPY, FLEXIBLE, WITH LESION REMOVAL USING SNARE;  Surgeon: Jose Faustin MD;  Location: [facility]    CV CENTRAL VENOUS CATHETER PLACEMENT N/A 10/26/2023    Procedure: Central Venous Catheter Placement;  Surgeon: Rob Lyles MD;  Location:  HEART CARDIAC CATH LAB    CV CORONARY ANGIOGRAM N/A 10/27/2023    Procedure: Coronary Angiogram;  Surgeon: Rob Lyles MD;  Location:  HEART CARDIAC CATH LAB    CV CORONARY ANGIOGRAM N/A 10/26/2023    Procedure: Coronary Angiogram;  Surgeon: Rob Lyles MD;  Location:  HEART CARDIAC CATH LAB    CV LEFT HEART CATH N/A 10/26/2023    Procedure: Left Heart Catheterization;  Surgeon: Rob Lyles MD;  Location:  HEART CARDIAC CATH LAB    CV PCI N/A 10/27/2023    Procedure: Percutaneous Coronary Intervention;  Surgeon: Rob Lyles MD;  Location:  HEART CARDIAC CATH LAB    CV PCI STENT DRUG ELUTING N/A 10/26/2023    Procedure: Percutaneous Coronary Intervention Stent;  Surgeon: Rob Lyles MD;  Location:  HEART CARDIAC CATH LAB    CV RIGHT HEART CATH MEASUREMENTS RECORDED N/A 5/6/2024    Procedure: Heart Cath Right Heart Cath;  Surgeon: Rob Lyles MD;  Location:  HEART CARDIAC CATH LAB    CV RIGHT HEART CATH MEASUREMENTS RECORDED N/A 9/3/2024    Procedure: Right Heart Catheterization;  Surgeon: Aaron Mesa MD;  Location: [facility] CARDIAC CATH LAB    EP ICD INSERT SINGLE N/A 5/8/2024    Procedure: Implantable Cardioverter Defibrillator Device & Lead Implant Dual;  Surgeon: Guero Black MD;  Location: [facility] CARDIAC CATH LAB    INJECTION, HYDROGEL SPACER N/A 4/22/2024    Procedure: INJECTION, HYDROGEL SPACER AND FIDUCIAL MARKER PLACEMENT;  Surgeon: Stanislaw Barros MD;  Location: [facility]    INSERT VENTRICULAR ASSIST DEVICE LEFT (HEARTMATE II) N/A  2024    Procedure: Median Sternotomy, INSERTION of LEFT VENTRICULAR ASSIST DEVICE (HEARTMATE III), cardiopulmonary bypass, transesophageal echocardiogram performed by anesthesia.;  Surgeon: Mulvihill, Michael, MD;  Location: [facility]    IRRIGATION AND DEBRIDEMENT CHEST WASHOUT, COMBINED Right 2024    Procedure: Exploration of chest, chest washout;  Surgeon: Mulvihill, Michael, MD;  Location: [facility] OR     acetaminophen (TYLENOL) 325 MG tablet  amiodarone (PACERONE) 200 MG tablet  atorvastatin (LIPITOR) 80 MG tablet  empagliflozin (JARDIANCE) 10 MG TABS tablet  escitalopram (LEXAPRO) 10 MG tablet  FISH OIL-VITAMIN D PO  gabapentin (NEURONTIN) 100 MG capsule  hydrALAZINE (APRESOLINE) 25 MG tablet  losartan (COZAAR) 50 MG tablet  magnesium hydroxide (MILK OF MAGNESIA) 400 MG/5ML suspension  magnesium oxide (MAG-OX) 400 MG tablet  melatonin 10 MG TABS tablet  melatonin 5 MG tablet  methocarbamol (ROBAXIN) 750 MG tablet  multivitamin w/minerals (THERA-VIT-M) tablet  multivitamin w/minerals (THERA-VIT-M) tablet  warfarin ANTICOAGULANT (COUMADIN) 2.5 MG tablet      Allergies   Allergen Reactions    Brilinta [Ticagrelor] Other (See Comments) and Difficulty breathing     Per pt and spouse, hyperventilation.    Clonazepam Other (See Comments)     Per spouse, acted like a zombie and he was shaky, could barely talk.     Family History  Family History   Problem Relation Age of Onset    Other Cancer Mother     Obesity Mother     GI problems Father     Uterine Cancer Sister      Social History   Social History     Tobacco Use    Smoking status: Former     Current packs/day: 0.00     Average packs/day: 0.3 packs/day for 30.0 years (7.5 ttl pk-yrs)     Types: Cigarettes     Start date: 10/26/1993     Quit date: 10/26/2023     Years since quittin.9     Passive exposure: Past    Smokeless tobacco: Never    Tobacco comments:     Quit 10/26/2023   Vaping Use    Vaping status: Never Used   Substance Use Topics    Alcohol use: Yes      Comment: 1-2 beers per day    Drug use: No      A medically appropriate review of systems was performed with pertinent positives and negatives noted in the HPI, and all other systems negative.    Physical Exam   BP: 92/76  Pulse: 92  Temp: 97.9  F (36.6  C)  Resp: 16  SpO2: 98 %  Physical Exam  General: awake, alert, NAD though patient appears frail and pale  Head: normal cephalic  HEENT: pupils equal, conjugate gaze intact  Neck: Supple  CV: mechanical whirr noted in chest  Lungs: clear to auscultation  Abd: soft, non-tender, no guarding, no peritoneal signs  EXT: lower extremities without swelling or edema  Neuro: awake, answers questions appropriately. No focal deficits noted; patient has normal speech fluency.  Cranial nerves II through XII are intact.  Patient has no neglect.  Visual fields are normal.  He has no pronator drift, normal sensation in upper and lower extremities.  Normal  strength.    ED Course, Procedures, & Data      Procedures       The patient has stroke symptoms:         ED Stroke specific documentation           NIHSS PDF     Patient last known well time: 8:40am   ED Provider first to bedside at: 9:20  CT Results received at: 9:56    Thrombolytics:   Not given due to:   - DOAC dose within 48 hours or INR > 1.7    If treating with thrombolytics: Ensure SBP<180 and DBP<105 prior to treatment with thrombolytics.  Administering thrombolytics after treatment with IV labetalol, hydralazine, or nicardipine is reasonable once BP control is established.    Endovascular Retrieval:  Not initiated due to absence of proximal vessel occlusion      Stroke Mimics were considered (including migraine headache, seizure disorder, hypoglycemia (or hyperglycemia), head or spinal trauma, CNS infection, Toxin ingestion and shock state (e.g. sepsis) .         EKG Interpretation:      Interpreted by Juan Diego Gallardo MD  Time reviewed:9:30:00   Symptoms at time of EKG: right sided weakness and hypotension    Rhythm: Sinus bradycardia  Rate: 57 bpm  Axis: Normal  Ectopy: None  Conduction: Right bundle branch block (incomplete)  ST Segments/ T Waves: No ST-T wave changes and No acute ischemic changes  Q Waves: None  Comparison to prior: Unchanged    Clinical Impression: sinus bradycardia with no signs of acute ischemia      Results for orders placed or performed during the hospital encounter of 10/23/24   CT Head w/o Contrast     Status: None    Narrative    CT HEAD W/O CONTRAST 10/23/2024 9:46 AM    History: Stroke.     Comparison: 10/9/2024     Technique: Using multidetector thin collimation helical acquisition  technique, axial, coronal and sagittal CT images from the skull base  to the vertex were obtained without intravenous contrast.     Findings: There is no intracranial hemorrhage, mass effect or midline  shift. There is no focal loss of gray-white matter differentiation,  insular ribbon sign, or focally hyperdense artery to suggest acute  infarction. The ventricles are proportionate to the cerebral sulci.  Mild generalized parenchymal atrophy. Mild patchy hypoattenuation of  the periventricular white matter which is nonspecific but favored to  represent chronic small vessel ischemic disease given patient's age.  Unchanged slightly asymmetric right frontal extra-axial space compared  to the left. Subtle extra-axial hyperdensity on series 4 image 22 is  favored artifactual.    The bony calvaria and the bones of the skull base appear normal.  Chronic opacification of the left maxillary sinus. The mastoid air  cells are clear.       Impression    Impression:   1. No acute intracranial pathology.  2. ASPECT Score: 10/10.    [Stroke Code Result: Negative]    Finding was identified on 10/23/2024 9:56 AM.     Dr. Juan Diego Shin was contacted by Dr. Geovanny Mayes on 10/23/2024 9:57  AM and verbalized understanding of the result.    [facility] Stroke Communication Guidelines:  [place] ED: [phone] (EB ED)  [place]  Inpatient: [phone] ext. [phone] (Stroke Ascom)  [facility] ED or Inpatient: [phone] ( ED)     I have personally reviewed the examination and initial interpretation  and I agree with the findings.    SJ SAUCEDA MD         SYSTEM ID:  [id number]   CTA Head Neck with Contrast     Status: None    Narrative    EXAM: CTA HEAD NECK W CONTRAST  10/23/2024 9:46 AM     HISTORY:  stroke code, right arm weakness       COMPARISON:  Head CT 10/9/2024, CTA 9/29/2024    TECHNIQUE:    HEAD and NECK CTA: During rapid bolus intravenous injection of  nonionic contrast material, axial images were obtained using thin  collimation multidetector helical technique from the base of the upper  aortic arch through the [employer] [place]. This CT angiogram data was  reconstructed at thin intervals with mild overlap. Images were sent to  the 3D workstation, and 3D reconstructions were obtained. The axial  source images, multiplanar reformations, 3D reconstructions in both  maximum intensity projection display and volume rendered models were  reviewed, with reconstructions performed by the technologist.    CONTRAST: 67 mL [id number]    FINDINGS:  Head CTA demonstrates no intracranial arterial aneurysm or stenosis.    Neck CTA demonstrates conventional aortic arch branching pattern and  patent great vessel origins. The normal distal right internal carotid  artery measures 5 mm. The normal distal left internal carotid artery  measures 5 mm. No vertebral artery stenosis. Mild atherosclerotic  calcification of the left carotid bulb.    No acute finding in the visualized neck soft tissues. Left greater  than right pleural effusions and diffuse interlobular septal  thickening.      Impression    IMPRESSION:    1. Head CTA demonstrates no aneurysm or stenosis of the major  intracranial arteries.   2. Neck CTA demonstrates no stenosis of the major cervical arteries.    I have personally reviewed the examination and initial interpretation  and I  agree with the findings.    SJ SAUCEDA MD         SYSTEM ID:  [id number]   [place] Draw     Status: None (In process)    Narrative    The following orders were created for panel order [place] Draw.  Procedure                               Abnormality         Status                     ---------                               -----------         ------                     Extra Blue Top Tube[[id number]]                              Final result               Extra Red Top Tube[[id number]]                                                          Extra Green Top (Lithium...[[id number]]                                                 Extra Purple Top Tube[[id number]]                                                       Extra Blood Bank Purple ...[[id number]]                                                   Please view results for these tests on the individual orders.   Basic metabolic panel     Status: Abnormal   Result Value Ref Range    Sodium 129 (L) 135 - 145 mmol/L    Potassium 4.5 3.4 - 5.3 mmol/L    Chloride 94 (L) 98 - 107 mmol/L    Carbon Dioxide (CO2) 23 22 - 29 mmol/L    Anion Gap 12 7 - 15 mmol/L    Urea Nitrogen 24.4 (H) 8.0 - 23.0 mg/dL    Creatinine 0.77 0.67 - 1.17 mg/dL    GFR Estimate >90 >60 mL/min/1.73m2    Calcium 8.6 (L) 8.8 - 10.4 mg/dL    Glucose 108 (H) 70 - 99 mg/dL   Troponin T, High Sensitivity     Status: Abnormal   Result Value Ref Range    Troponin T, High Sensitivity 92 (H) <=22 ng/L   Extra Blue Top Tube     Status: None   Result Value Ref Range    Hold Specimen [facility]    Glucose by meter     Status: Normal   Result Value Ref Range    GLUCOSE BY METER POCT 72 70 - 99 mg/dL   CBC with platelets and differential     Status: Abnormal   Result Value Ref Range    WBC Count 8.2 4.0 - 11.0 10e3/uL    RBC Count 3.70 (L) 4.40 - 5.90 10e6/uL    Hemoglobin 11.4 (L) 13.3 - 17.7 g/dL    Hematocrit 35.5 (L) 40.0 - 53.0 %    MCV 96 78 - 100 fL    MCH 30.8 26.5 - 33.0 pg    MCHC 32.1 31.5 - 36.5 g/dL    RDW  17.2 (H) 10.0 - 15.0 %    Platelet Count 206 150 - 450 10e3/uL    % Neutrophils 86 %    % Lymphocytes 4 %    % Monocytes 8 %    % Eosinophils 1 %    % Basophils 1 %    % Immature Granulocytes 1 %    NRBCs per 100 WBC 0 <1 /100    Absolute Neutrophils 7.1 1.6 - 8.3 10e3/uL    Absolute Lymphocytes 0.4 (L) 0.8 - 5.3 10e3/uL    Absolute Monocytes 0.6 0.0 - 1.3 10e3/uL    Absolute Eosinophils 0.1 0.0 - 0.7 10e3/uL    Absolute Basophils 0.1 0.0 - 0.2 10e3/uL    Absolute Immature Granulocytes 0.1 <=0.4 10e3/uL    Absolute NRBCs 0.0 10e3/uL   Magnesium     Status: Normal   Result Value Ref Range    Magnesium 1.9 1.7 - 2.3 mg/dL   iStat Gases Electrolytes ICA Glucose Venous, POCT     Status: Abnormal   Result Value Ref Range    CPB Applied No     Hematocrit POCT 34 (L) 40 - 53 %    Calcium, Ionized Whole Blood POCT 4.4 4.4 - 5.2 mg/dL    Glucose Whole Blood POCT 97 70 - 99 mg/dL    Bicarbonate Venous POCT 24 21 - 28 mmol/L    Hemoglobin POCT 11.6 (L) 13.3 - 17.7 g/dL    Potassium POCT 3.8 3.4 - 5.3 mmol/L    Sodium POCT 132 (L) 135 - 145 mmol/L    pCO2 Venous POCT 38 (L) 40 - 50 mm Hg    pO2 Venous POCT 30 25 - 47 mm Hg    pH Venous POCT 7.41 7.32 - 7.43    O2 Sat, Venous POCT 59 (L) 70 - 75 %    Base Excess/Deficit (+/-) POCT -1.0 -3.0 - 3.0 mmol/L   Creatinine POCT     Status: Abnormal   Result Value Ref Range    Creatinine POCT 0.6 (L) 0.7 - 1.3 mg/dL    GFR, ESTIMATED POCT >60 >60 mL/min/1.73m2   iStat Gases (lactate) venous, POCT     Status: Abnormal   Result Value Ref Range    Lactic Acid POCT 1.1 <=2.0 mmol/L    Bicarbonate Venous POCT 26 21 - 28 mmol/L    O2 Sat, Venous POCT 57 (L) 70 - 75 %    pCO2 Venous POCT 42 40 - 50 mm Hg    pH Venous POCT 7.40 7.32 - 7.43    pO2 Venous POCT 30 25 - 47 mm Hg    Base Excess/Deficit (+/-) POCT 1.0 -3.0 - 3.0 mmol/L   Troponin T, High Sensitivity     Status: Abnormal   Result Value Ref Range    Troponin T, High Sensitivity 70 (H) <=22 ng/L   Echocardiogram Complete - For age > 60  yrs     Status: None   Result Value Ref Range    LVEF  <20%     [facility]    [id number]  [id number]  [id number]  [id number]^DENISE^CLOTILDE     [facility],[place]  Echocardiography Laboratory  [address] [place] [address]     Name: JOHNSON, DUANE C  MRN: [id number]  : 1950  Study Date: 10/23/2024 10:03 AM  Age: 74 yrs  Gender: Male  Patient Location: [facility]  Reason For Study: Cerebrovascular Incident  Ordering Physician: CLOTILDE WALKER  Performed By: Luther Tidwell     BSA: 1.7 m2  Height: 66 in  Weight: 136 lb  ______________________________________________________________________________  Procedure  LVAD Echocardiogram with two-dimensional, color and spectral Doppler  performed.  ______________________________________________________________________________  Interpretation Summary  LVAD cannula was seen in the expected anatomic position in the LV apex.  HM3 at 5100RPM.  LVIDd 47mm.  Septum normal.  Aortic valve open intermittently. No AI.  Normal flow velocities.  Global right ventricular function is mildly to moderately reduced.  Small circumferential pericardial effusion is present without any hemodynamic  significance.  ______________________________________________________________________________  Left Ventricle  The visual ejection fraction is <20%. LVAD cannula was seen in the expected  anatomic position in the LV apex. HM3 at 5100RPM.  LVIDd 47mm.  Septum normal.  Aortic valve open intermittently. No AI.  Normal flow velocities.     Right Ventricle  Global right ventricular function is mildly to moderately reduced. A pacemaker  lead is noted in the right ventricle.     Vessels  IVC diameter >2.1 cm collapsing <50% with sniff suggests a high RA pressure  estimated at 15 mmHg or greater.     Pericardium  Small circumferential pericardial effusion is present without any  hemodynamic  significance.  ______________________________________________________________________________  MMode/2D Measurements & Calculations     IVSd: 1.3 cm  LVIDd: 4.7 cm  LVIDs: 4.0 cm  LVPWd: 1.3 cm  FS: 14.8 %  LV mass(C)d: 230.6 grams  LV mass(C)dI: 135.8 grams/m2  RWT: 0.55     Doppler Measurements & Calculations  TR max francisco j: 226.4 cm/sec  TR max P.5 mmHg     ______________________________________________________________________________  Report approved by: Eusebio Terry 10/23/2024 11:58 AM         Adult Type and Screen     Status: None   Result Value Ref Range    ABO/RH(D) O NEG     Antibody Screen Negative Negative    SPECIMEN EXPIRATION DATE [id number]    CBC with platelets differential     Status: Abnormal    Narrative    The following orders were created for panel order CBC with platelets differential.  Procedure                               Abnormality         Status                     ---------                               -----------         ------                     CBC with platelets and d...[[id number]]  Abnormal            Final result                 Please view results for these tests on the individual orders.   ABO/Rh type and screen     Status: None    Narrative    The following orders were created for panel order ABO/Rh type and screen.  Procedure                               Abnormality         Status                     ---------                               -----------         ------                     Adult Type and Screen[[id number]]                            Final result                 Please view results for these tests on the individual orders.   Results for orders placed or performed during the hospital encounter of 10/17/24   INR     Status: Abnormal   Result Value Ref Range    INR 2.42 (H) 0.85 - 1.15   Extra Tube     Status: None    Narrative    The following orders were created for panel order Extra Tube.  Procedure                               Abnormality          Status                     ---------                               -----------         ------                     Extra Green Top (Lithium...[[id number]]                      Final result               Extra Purple Top Tube[[id number]]                            Final result                 Please view results for these tests on the individual orders.   Extra Green Top (Lithium Heparin) Tube     Status: None   Result Value Ref Range    Hold Specimen [facility]    Extra Purple Top Tube     Status: None   Result Value Ref Range    Hold Specimen [facility]    INR     Status: Abnormal   Result Value Ref Range    INR 2.35 (H) 0.85 - 1.15   Extra Tube     Status: None    Narrative    The following orders were created for panel order Extra Tube.  Procedure                               Abnormality         Status                     ---------                               -----------         ------                     Extra Purple Top Tube[[id number]]                            Final result                 Please view results for these tests on the individual orders.   Extra Purple Top Tube     Status: None   Result Value Ref Range    Hold Specimen [facility]    INR     Status: Abnormal   Result Value Ref Range    INR 2.40 (H) 0.85 - 1.15   INR     Status: Abnormal   Result Value Ref Range    INR 2.40 (H) 0.85 - 1.15   Lactate Dehydrogenase     Status: Normal   Result Value Ref Range    Lactate Dehydrogenase 233 0 - 250 U/L   CBC with platelets     Status: Abnormal   Result Value Ref Range    WBC Count 7.2 4.0 - 11.0 10e3/uL    RBC Count 3.38 (L) 4.40 - 5.90 10e6/uL    Hemoglobin 10.8 (L) 13.3 - 17.7 g/dL    Hematocrit 32.0 (L) 40.0 - 53.0 %    MCV 95 78 - 100 fL    MCH 32.0 26.5 - 33.0 pg    MCHC 33.8 31.5 - 36.5 g/dL    RDW 16.9 (H) 10.0 - 15.0 %    Platelet Count 205 150 - 450 10e3/uL   Basic metabolic panel     Status: Abnormal   Result Value Ref Range    Sodium 132 (L) 135 - 145 mmol/L    Potassium 4.3 3.4 - 5.3 mmol/L    Chloride 94  (L) 98 - 107 mmol/L    Carbon Dioxide (CO2) 28 22 - 29 mmol/L    Anion Gap 10 7 - 15 mmol/L    Urea Nitrogen 23.6 (H) 8.0 - 23.0 mg/dL    Creatinine 0.67 0.67 - 1.17 mg/dL    GFR Estimate >90 >60 mL/min/1.73m2    Calcium 8.7 (L) 8.8 - 10.4 mg/dL    Glucose 97 70 - 99 mg/dL   INR     Status: Abnormal   Result Value Ref Range    INR 2.30 (H) 0.85 - 1.15   Echo Complete     Status: None   Result Value Ref Range    LVEF  <20%     [facility]    [id number]  [facility]  [id number]  [id number]^BIJAL^DARCI^BIB     [facility]  Echocardiography Laboratory  [address] [place] [id number]     Name: JOHNSON, DUANE C  MRN: [id number]  : 1950  Study Date: 10/18/2024 02:17 PM  Age: 74 yrs  Gender: Male  Patient Location: [facility]  Reason For Study: Cardiac Device, Unspecified  Ordering Physician: DARCI APPIAH  Performed By: Aranza Nelson     BSA: 1.7 m2  Height: 66 in  Weight: 134 lb  HR: 75  ______________________________________________________________________________  Procedure  LVAD Echocardiogram with two-dimensional, color and spectral Doppler  performed.  ______________________________________________________________________________  Interpretation Summary  LVAD cannula was seen in the expected anatomic position in the LV apex.  HM3 at 5100RPM.  LVIDd 44mm.  Septum normal.  Aortic valve open with each systole. No AI.  Normal flow velocities.  Global right ventricular function is mildly to moderately reduced.  Pulmonary artery systolic pressure is normal.  The inferior vena cava is normal.  ______________________________________________________________________________  Left Ventricle  The visual ejection fraction is <20%. LVAD cannula was seen in the expected  anatomic position in the LV apex. HM3 at 5100RPM.  LVIDd 44mm.  Septum normal.  Aortic valve open with each systole. No AI.  Normal flow velocities.     Right Ventricle  Global right ventricular function is mildly to  moderately reduced. A pacemaker  lead is noted in the right ventricle.     Atria  Both atria appear normal.     Mitral Valve  The mitral valve is normal.     Aortic Valve  No aortic regurgitation is present.     Tricuspid Valve  The tricuspid valve is normal. Trace to mild tricuspid insufficiency is  present. The right ventricular systolic pressure is approximated at 11.4 mmHg  plus the right atrial pressure. Pulmonary artery systolic pressure is normal.     Vessels  The inferior vena cava is normal.     ______________________________________________________________________________  MMode/2D Measurements & Calculations  IVSd: 1.1 cm  LVIDd: 4.4 cm  LVIDs: 3.7 cm  LVPWd: 1.2 cm  FS: 15.4 %  LV mass(C)d: 174.6 grams  LV mass(C)dI: 103.5 grams/m2  RWT: 0.56     Doppler Measurements & Calculations  TR max francisco j: 169.0 cm/sec  TR max P.4 mmHg     ______________________________________________________________________________  Report approved by: Eusebio Terry 10/18/2024 03:17 PM         Results for orders placed or performed in visit on 10/23/24   Cardiac Device Check - In Clinic     Status: None (In process)   Result Value Ref Range    Date Time Interrogation Session [id number]     Implantable Pulse Generator  [employer]     Implantable Pulse Generator Model D533 RESONATE HF ICD     Implantable Pulse Generator Serial Number [id number]     Type Interrogation Session In Clinic     Clinic Name [facility] [place]     Implantable Pulse Generator Type Defibrillator     Implantable Pulse Generator Implant Date 2024     Implantable Lead  [employer]     Implantable Lead Model 0672 [place] 4-Front S     Implantable Lead Serial Number [id number]     Implantable Lead Implant Date 2024     Implantable Lead Polarity Type Bipolar Lead     Implantable Lead Location Detail 1 UNKNOWN     Implantable Lead Special Function 59cm length     Implantable Lead Location Right  Ventricle     Implantable Lead Connection Status Connected     Implantable Lead  [employer]     Implantable Lead Model 7841 Ingevity+ MRI     Implantable Lead Serial Number [id number]     Implantable Lead Implant Date 20240508     Implantable Lead Polarity Type Bipolar Lead     Implantable Lead Location Detail 1 UNKNOWN     Implantable Lead Special Function 52cm length     Implantable Lead Location Right Atrium     Implantable Lead Connection Status Connected     Bharathi Setting Mode (NBG Code) DDDR     Bharathi Setting Lower Rate Limit 60 [beats]/min    Bharathi Setting Maximum Tracking Rate 130 [beats]/min    Bharathi Setting Maximum Sensor Rate 130 [beats]/min    Bharathi Setting JUANITO Delay Low 300.0 ms    Bharathi Setting PAV Delay Low 300.0 ms    Bharathi Setting PAV Delay High 200.0 ms    Bharathi Setting JUANITO Delay High 200.0 ms    Bharathi Setting AT Mode Switch Rate 170 [beats]/min    Bharathi Setting AT Mode Switch Mode VDIR     Lead Channel Setting Sensing Polarity Bipolar     Lead Channel Setting Sensing Sensitivity 0.25 mV    Lead Channel Setting Sensing Adaptation Mode Adaptive     Lead Channel Setting Sensing Polarity Bipolar     Lead Channel Setting Sensing Sensitivity 0.6 mV    Lead Channel Setting Sensing Adaptation Mode Adaptive     Lead Channel Setting Pacing Polarity Bipolar     Lead Channel Setting Pacing Pulse Width 0.4 ms    Lead Channel Setting Pacing Amplitude 2.0 V    Lead Channel Setting Pacing Capture Mode Adaptive     Lead Channel Setting Pacing Polarity Bipolar     Lead Channel Setting Pacing Pulse Width 0.4 ms    Lead Channel Setting Pacing Amplitude 2.0 V    Lead Channel Setting Pacing Capture Mode Adaptive     Zone Setting Type Category VF     Zone Setting Vendor Type Category VF     Zone Setting Status Active     Zone Setting Detection Interval 250.0 ms    Zone Setting Type Category VT     Zone Setting Vendor Type Category VT     Zone Setting Status Active     Zone Setting Detection Interval  300.0 ms    Zone Setting Type Category VT     Zone Setting Vendor Type Category VT-1     Zone Setting Status Monitor     Zone Setting Detection Interval 353.0 ms    Lead Channel Impedance Value 594.0 ohm    Lead Channel Sensing Intrinsic Amplitude 2.3 mV    Lead Channel Impedance Value 353.0 ohm    Lead Channel Sensing Intrinsic Amplitude 15.9 mV    Lead Channel Pacing Threshold Amplitude 0.7000 V    Lead Channel Pacing Threshold Pulse Width 0.4 ms    Battery Date Time of Measurements [id number]     Battery Status Beginning of Service     Battery Remaining Longevity 132 mo    Battery Remaining Percentage 100.0 %    Capacitor Charge Type Shock     Capacitor Charge Time 0 s    Capacitor Charge Energy 0 J    Bharathi Statistic Date Time Start [id number]     Bharathi Statistic Date Time End 20241023000000     Bharathi Statistic RA Percent Paced 1 %    Bharathi Statistic RV Percent Paced 5 %    Therapy Statistic Total Shocks Delivered 0     Therapy Statistic Total Shocks Aborted 0     Therapy Statistic Total ATP Delivered 0     Therapy Statistic Total  Date Time End 20241023000000     Therapy Statistic Recent Shocks Delivered 0     Therapy Statistic Recent Shocks Aborted 0     Therapy Statistic Recent ATP Delivered 0     Therapy Statistic Recent Date Time Start [id number]     Therapy Statistic Recent Date Time End 20241023000000     Episode Statistic Total Count 2     Episode Statistic Type Category VT     Episode Statistic Vendor Type Category NSVT     Episode Statistic Total Count 0     Episode Statistic Type Category VF     Episode Statistic Vendor Type Category VF     Episode Statistic Total Count 0     Episode Statistic Type Category VF_VT_MONITOR     Episode Statistic Total Date Time End 20241023000000     Episode Statistic Total Date Time End 20241023000000     Episode Statistic Total Date Time End 20241023000000     Episode Statistic Recent Count 0     Episode Statistic Type Category VT     Episode Statistic  Vendor Type Category NSVT     Episode Statistic Recent Count 0     Episode Statistic Type Category VF     Episode Statistic Vendor Type Category VF     Episode Statistic Recent Count 0     Episode Statistic Type Category VF_VT_MONITOR     Episode Statistic Recent Date Time Start 20240919034717     Episode Statistic Recent Date Time End 20241023000000     Episode Statistic Recent Date Time Start [id number]     Episode Statistic Recent Date Time End 20241023000000     Episode Statistic Recent Date Time Start 20240919034717     Episode Statistic Recent Date Time End 20241023000000     Narrative    Patient seen in [facility] for evaluation and iterative programming of their ICD per MD orders.     Device: [employer] D533 RESONATE HF ICD  Normal device function.  Mode: DDDR  bpm  AP: <1%  : 5%  Intrinsic rhythm: Aflutter w/ VS  bpm  Lead Trends Appear Stable.  Estimated battery longevity to RRT = 11 years.    Atrial Arrhythmia: Known PAF, This AF episode began on 10/10/24, V rates Avg  bpm.  Patient has been c/o dizziness.   AF [place]: 37%  Anticoagulant: Warfarin  Ventricular Arrhythmia: 2 NSVT episodes detected, Stored EGM's show noise related to JOHN, pt had his LVAD implanted that day.  Setting Changes: None  Patient has an appointment to see Torrie LIGHT today.   Plan: Device follow-up every 3 months.    Lu Conteh RN    Dual lead ICD    I have reviewed and interpreted the device interrogation, settings, programming and nurse's summary. The device is functioning within normal device parameters. I agree with the current findings, assessment and plan.   Results for orders placed or performed in visit on 10/23/24   INR     Status: Abnormal   Result Value Ref Range    INR 2.26 (H) 0.85 - 1.15     Medications   lidocaine 1 % 0.1-1 mL (has no administration in time range)   lidocaine (LMX4) cream (has no administration in time range)   sodium chloride (PF) 0.9% PF flush 3 mL (has no  administration in time range)   medication instruction (has no administration in time range)   acetaminophen (TYLENOL) tablet 650 mg (has no administration in time range)   Patient is already receiving anticoagulation with heparin, enoxaparin (LOVENOX), warfarin (COUMADIN)  or other anticoagulant medication (has no administration in time range)   amiodarone (PACERONE) tablet 200 mg (has no administration in time range)   empagliflozin (JARDIANCE) tablet 10 mg ( Oral Unheld by provider 10/23/24 1202)   escitalopram (LEXAPRO) tablet 10 mg (has no administration in time range)   gabapentin (NEURONTIN) capsule 100 mg (has no administration in time range)   hydrALAZINE (APRESOLINE) tablet 25 mg ( Oral Automatically Held 10/26/24 2000)   losartan (COZAAR) tablet 50 mg ( Oral Automatically Held 10/26/24 2000)   magnesium hydroxide (MILK OF MAGNESIA) suspension 30 mL (has no administration in time range)   magnesium oxide (MAG-OX) tablet 400 mg (has no administration in time range)   methocarbamol (ROBAXIN) tablet 750 mg (has no administration in time range)   multivitamin w/minerals (THERA-VIT-M) tablet 1 tablet (has no administration in time range)   multivitamin w/minerals (THERA-VIT-M) tablet 1 tablet (has no administration in time range)   Warfarin Dose Required Daily - Pharmacist Managed (has no administration in time range)   fludrocortisone (FLORINEF) tablet 0.1 mg (has no administration in time range)   atorvastatin (LIPITOR) tablet 80 mg (has no administration in time range)     Labs Ordered and Resulted from Time of ED Arrival to Time of ED Departure   BASIC METABOLIC PANEL - Abnormal       Result Value    Sodium 129 (*)     Potassium 4.5      Chloride 94 (*)     Carbon Dioxide (CO2) 23      Anion Gap 12      Urea Nitrogen 24.4 (*)     Creatinine 0.77      GFR Estimate >90      Calcium 8.6 (*)     Glucose 108 (*)    TROPONIN T, HIGH SENSITIVITY - Abnormal    Troponin T, High Sensitivity 92 (*)    CBC WITH  PLATELETS AND DIFFERENTIAL - Abnormal    WBC Count 8.2      RBC Count 3.70 (*)     Hemoglobin 11.4 (*)     Hematocrit 35.5 (*)     MCV 96      MCH 30.8      MCHC 32.1      RDW 17.2 (*)     Platelet Count 206      % Neutrophils 86      % Lymphocytes 4      % Monocytes 8      % Eosinophils 1      % Basophils 1      % Immature Granulocytes 1      NRBCs per 100 WBC 0      Absolute Neutrophils 7.1      Absolute Lymphocytes 0.4 (*)     Absolute Monocytes 0.6      Absolute Eosinophils 0.1      Absolute Basophils 0.1      Absolute Immature Granulocytes 0.1      Absolute NRBCs 0.0     ISTAT GASES ELECTROLYTES ICA GLUCOSE VENOUS POCT - Abnormal    CPB Applied No      Hematocrit POCT 34 (*)     Calcium, Ionized Whole Blood POCT 4.4      Glucose Whole Blood POCT 97      Bicarbonate Venous POCT 24      Hemoglobin POCT 11.6 (*)     Potassium POCT 3.8      Sodium POCT 132 (*)     pCO2 Venous POCT 38 (*)     pO2 Venous POCT 30      pH Venous POCT 7.41      O2 Sat, Venous POCT 59 (*)     Base Excess/Deficit (+/-) POCT -1.0     ISTAT CREATININE POCT - Abnormal    Creatinine POCT 0.6 (*)     GFR, ESTIMATED POCT >60     ISTAT GASES LACTATE VENOUS POCT - Abnormal    Lactic Acid POCT 1.1      Bicarbonate Venous POCT 26      O2 Sat, Venous POCT 57 (*)     pCO2 Venous POCT 42      pH Venous POCT 7.40      pO2 Venous POCT 30      Base Excess/Deficit (+/-) POCT 1.0     TROPONIN T, HIGH SENSITIVITY - Abnormal    Troponin T, High Sensitivity 70 (*)    GLUCOSE BY METER - Normal    GLUCOSE BY METER POCT 72     MAGNESIUM - Normal    Magnesium 1.9     GLUCOSE MONITOR NURSING POCT   GLUCOSE MONITOR NURSING POCT   GLUCOSE MONITOR NURSING POCT   GLUCOSE MONITOR NURSING POCT   TYPE AND SCREEN, ADULT    ABO/RH(D) O NEG      Antibody Screen Negative      SPECIMEN EXPIRATION DATE [id number]     ABO/RH TYPE AND SCREEN     Echocardiogram Complete - For age > 60 yrs   Final Result      CTA Head Neck with Contrast   Final Result   IMPRESSION:     1. Head  CTA demonstrates no aneurysm or stenosis of the major   intracranial arteries.    2. Neck CTA demonstrates no stenosis of the major cervical arteries.      I have personally reviewed the examination and initial interpretation   and I agree with the findings.      SJ SAUCEDA MD            SYSTEM ID:  [id number]      CT Head w/o Contrast   Final Result   Impression:    1. No acute intracranial pathology.   2. ASPECT Score: 10/10.      [Stroke Code Result: Negative]      Finding was identified on 10/23/2024 9:56 AM.       Dr. Juan Diego Shin was contacted by Dr. Geovanny Mayes on 10/23/2024 9:57   AM and verbalized understanding of the result.      [facility] Stroke Communication Guidelines:   Mobile ED: [phone] (EB ED)   Mobile Inpatient: [phone] ext. [phone] (Stroke Ascom)   [facility] ED or Inpatient: [phone] (WB ED)       I have personally reviewed the examination and initial interpretation   and I agree with the findings.      SJ SAUCEDA MD            SYSTEM ID:  [id number]             Critical care: Patient was evaluated for stroke and hypotension.  Time was spent discussing care with consultants including cardiology to service a.m. neurology.  Time was also spent assessing labs, reviewing imaging, coordinating care.  Time spent documenting. Total time spent 60 minutes.    Assessment & Plan    Patient presents from the LVAD clinic with low MAPs and new right extremity weakness.  Differential could include stroke, electrolyte abnormality, anemia, medication effect.  patient is within the stroke window however in clinic had an INR of 2.6.  On my exam patient is laying flat. He reports that his symptoms are resolved on my exam. He has no right extremities drift or weakness.  Neurology has examined, there is a questionable dysarthria that comes and goes though patient is adentate.   Patient is high risk for stroke given past medical history however would not be a candidate for lytics at this time. He was  expedited to head CT and CTA.  Reassuringly his hemoglobin is normal.  His creatinine is normal. Glucose normal.  He does appear to continue to be hypotensive with a MAP of 46 here in the ER.  He was started on IV fluids, he is mentating normally, is asymptomatic without dizziness or chest pain. Plan is to attempt fluid rehydration and then will discuss with Cards II whether or not pressors should be started in the setting of getting hydralazine and lisinopril earlier this morning versus ongoing fluid hydration and monitoring of symptoms.     Per Cards okay to give IV fluids, can start pressors if needed but resuscitate with IV fluids up to 2L initially.      9:48 AM stroke neuro viewed CT/CTA scan, negative for acute abnormalities. Neurology placed orders for neuro checks. Repeat head imaging in 24 hours. If symptoms worsen call stroke neuro.     10:45 AM Patient's head CT showed no acute findings. CTA was unremarkable. Patient cannot get an MRI because of his LVAD.  Stroke team recommended repeat head CT in 24 hours and repeat echo.  Discussed case with the cardiology service; they recommended ongoing fluid resuscitation for low MAPs. If 2 L does not improve can start peripheral pressors he will be admitted to the cardiology service.      Patient's blood pressure improved after 1 L of fluid. His symptoms has have at this point resolved.  His labs notable for low sodium, slightly elevated troponin above baseline. Baseline hemoglobin and INR was reviewed from earlier today of 2.6.  Patient be admitted to the cards II service with ongoing monitoring of his symptoms.  Patient did not require pressors while in the ER.    I have reviewed the nursing notes. I have reviewed the findings, diagnosis, plan and need for follow up with the patient.    New Prescriptions    No medications on file       Final diagnoses:   Transient hypotension   Right arm weakness   LVAD (left ventricular assist device) present (H)   Chelsey CURIEL  MICH Szymanski, am serving as a trained medical scribe to document services personally performed by Juan Diego Shin MD, based on the provider's statements to me.     I, Juan Diego Shin MD, was physically present and have reviewed and verified the accuracy of this note documented by Chelsey Szymanski.     Juan Diego Shni MD  [facility] EMERGENCY DEPARTMENT  10/23/2024     Juan Diego Shin MD  10/23/24 1230

## 2024-10-23 NOTE — PROGRESS NOTES
It was a pleasure to see you in clinic today, please do not hesitate to call us for questions or concerns.  We look forward to seeing you back in clinic on 12/20/2024, when you return to see Dr. Cruz.    Lu Conteh RN    Electrophysiology Nurse Clinician  [facility]    During Business Hours Please Call:  [phone]  After Hours Please Call:  [phone] - select option #4 and ask for job code [phone]

## 2024-10-23 NOTE — CARE PLAN
RN: pt to cardiology appointment this morning and 0800 LVAD supervisor called stating stroke code called on pt during visit and pt is now transferred to ED [facility]. Provider updated. Pt discharged from TCU. Adriana MURGUIA PCS brought emergency LVAD black bag to ED and handed off to assigned nurse [id number].

## 2024-10-23 NOTE — PLAN OF CARE
Goal Outcome Evaluation:    Plan of Care Reviewed With: patient    Overall Patient Progress: no change    Pt alert and oriented x4. Uses call light appropriately and makes needs known to staff. No complaints of SOB, N/V, and chest pain this shift. HM3 LVAD within parameters, no alarms noted. LVAD dressings in place and intact. On level 7 easy to chew diet, thin liquids, tolerates medications whole. Urinal at bedside for staff to empty. 0600 MAP was 78 via doppler, BP 93/77 with calculated MAP 82. Picked up 0700 for lab appointment and cardiac device check. No other issues this shift. Will continue with plan of care.

## 2024-10-23 NOTE — PATIENT INSTRUCTIONS
" Ventricular Assist Device Clinic  Take your medications every day, as directed!  Medication changes made today:    Instructions:  We called an ambulance for you to go to the ED. Monitor your heart failure, Page the VAD Coordinator on call:  If you gain more than 3 lb in a day or 5 in a week  If you feel worsening shortness of breath, palpitations, or swelling.   If you have VAD alarms or change in parameters  If you feel dizzy or lightheaded     Keep your VAD appointments!    Your next appointment is 10/30 with Sirisha Arias with an ECHO and labs prior.      Please see the clinic schedulers after your appointment to schedule follow-up. To contact the VAD , call [phone] To Page the VAD Coordinator on call, dial [phone] option #[phone] and ask to speak to the VAD coordinator on call. Try to maintain a heart healthy lifestyle!  Limit salt & sodium to 2000mg/day   Eat a heart healthy diet, low in saturated fats  Stay active! Aim to move at least 150 minutes every week.    Use [employer] allows you to communicate directly with your heart team through secure messaging.  [employer] can be accessed any time on your phone, computer, or tablet.  If you need assistance, we'd be happy to help!      Equipment Reminders:   Charge your back-up controller at least every 6 months. To charge, connect it to either batteries or wall power. The screen will read \"Charging\". Keep connected until the screen reads \"charging complete\". Disconnect power once the controller battery is charged. Also do a self-test when the controller is connected to power.  Replace the AA batteries in your mobile power unit every 6 months.  Check your battery charger for when it is due for maintenance. It requires inspection in clinic once per year. There will be a sticker stating the month and year maintenance is due. When you bring your battery charger to clinic, tell one of the schedulers you have LVAD equipment that needs maintenance. They " will call ReplySend. You can leave your  under the LVAD sign by the  at the far end of clinic. You must drop it off before 2pm.

## 2024-10-23 NOTE — CONSULTS
[facility]     Stroke Code Note          History of Present Illness     Chief Complaint: Stroke Symptoms      Duane C Johnson is a 74 year old male who has PMHx of STEMI 10/2023, A-fib s/p DCCV on 9/5 and on warfarin, HTN, HLD, and s/p LVAD placement on 9/18 who was brought to ED from clinic and stroke code called for RUE weakness that since resolved upon presenting to the ED. LKW 8:45 AM and MAP 42. Glucose in the 72. Per ED patient was in clinic and notable RUE drift with strength 4/5. INR 2.26.     Patient reports acute onset RUE weakness that has since resolved. Denies vision loss, dysphagia, focal numbness, or word finding difficulty but does note dysarthria.            Past Medical History     Stroke risk factors: CAD, HTN, HLD, a fib on warfarin    Preadmission antithrombotic regimen: wafarin    Modified [place] Score (Pre-morbid)  0-No deficits                   Assessment and Plan       Duane C Johnson is a 74 year old male who has PMHx of STEMI 10/2023, A-fib s/p DCCV on 9/5 and on warfarin, HTN, HLD, and s/p LVAD placement on 9/18 who was brought to ED from clinic and stroke code called for RUE weakness that since resolved upon presenting to the ED. LKW 8:45 AM and MAP 42. Glucose in the 72. Per ED patient was in clinic and notable RUE drift with strength 4/5. INR 2.26.     NIH of 1 for mild dysarthria. CT head no hemorrhage. CTA head and neck no LVO or severe stenosis. Unable to obtain MRI given LVAD. No TNK given INR 2.26 and low NIH. No mechanical thrombectomy given no LVO. Recommend repeat CT head in 24 hours. Recommend TTE and toxic metabolic workup per ED. Exam is non focal without clear localization for stroke and less likely. Other possible etiology of dysarthria given lack of dentures and low MAP.      Intravenous Thrombolysis  Not given due to:   - DOAC dose within 48 hours or INR > 1.7     Endovascular Treatment  Not initiated due to absence of  proximal vessel occlusion     Plan:  -repeat CT head without contrast in AM   -TTE   -Neuro checks Q4H  -SLP/PT/OT  -SBP goal < 220/120  -LDL goal < 70  -A1c goal < 7.0  -telemetry   ___________________________________________________________________    The patient was discussed with  Stroke Staff is Dr. Constance Coppola MD  Neurology Resident    ___________________________________________________________________        Imaging/Labs   (personally reviewed )    CT head:   No hemorrhage    CTA head/neck:   No LVO or severe stenosis            Physical Examination     BP: (!) 80/65   Pulse: 115   Resp: 16   Temp: 97.9  F (36.6  C)   Temp src: Oral   SpO2: 97 %   O2 Device: None (Room air)        Wt Readings from Last 2 Encounters:   10/23/24 62.1 kg (136 lb 14.4 oz)   10/17/24 63.1 kg (139 lb 1.8 oz)       General Exam  General:  patient lying in bed without any acute distress    HEENT:  normocephalic/atraumatic  Cardio:  RRR  Pulmonary:  no respiratory distress  Abdomen:  soft  Extremities:  no edema  Skin:  intact, warm/dry     Neuro Exam  Mental Status:  alert, oriented x 3, follows commands, speech clear and fluent, naming and repetition normal  Cranial Nerves:  visual fields intact, PERRL, EOMI with normal smooth pursuit, facial sensation intact and symmetric, facial movements symmetric, hearing not formally tested but intact to conversation, palate elevation symmetric and uvula midline, mild dysarthria, shoulder shrug strong bilaterally, tongue protrusion midline  Motor:  normal muscle tone and bulk, no abnormal movements, able to move all limbs spontaneously, strength 5/5 throughout upper and lower extremities, no pronator drift  Reflexes:  toes down-going  Sensory:  light touch sensation intact and symmetric throughout upper and lower extremities, no extinction on double simultaneous stimulation   Coordination:  normal finger-to-nose and heel-to-shin bilaterally without dysmetria  Station/Gait:   deferred        Stroke Scales       NIHSS  1a. Level of Consciousness 0-->Alert, keenly responsive   1b. LOC Questions 0-->Answers both questions correctly   1c. LOC Commands 0-->Performs both tasks correctly   2.   Best Gaze 0-->Normal   3.   Visual 0-->No visual loss   4.   Facial Palsy 0-->Normal symmetrical movements   5a. Motor Arm, Left 0-->No drift, limb holds 90 (or 45) degrees for full 10 secs   5b. Motor Arm, Right 0-->No drift, limb holds 90 (or 45) degrees for full 10 secs   6a. Motor Leg, Left 0-->No drift, leg holds 30 degree position for full 5 secs   6b. Motor Leg, right 0-->No drift, leg holds 30 degree position for full 5 secs   7.   Limb Ataxia 0-->Absent   8.   Sensory 0-->Normal, no sensory loss   9.   Best Language 0-->No aphasia, normal   10. Dysarthria 1-->Mild-to-moderate dysarthria, patient slurs at least some words and, at worst, can be understood with some difficulty   11. Extinction and Inattention  0-->No abnormality   Total 1 (10/23/24 0932)            Labs     CBC  Lab Results   Component Value Date    HGB 11.4 (L) 10/23/2024    HGB 11.6 (L) 10/23/2024    HCT 35.5 (L) 10/23/2024    HCT 34 (L) 10/23/2024    WBC 8.2 10/23/2024     10/23/2024       BMP  Lab Results   Component Value Date     (L) 10/23/2024     (L) 10/23/2024    POTASSIUM 4.5 10/23/2024    POTASSIUM 3.8 10/23/2024    CHLORIDE 94 (L) 10/23/2024    CO2 23 10/23/2024    BUN 24.4 (H) 10/23/2024    CR 0.6 (L) 10/23/2024     (H) 10/23/2024    GLC 97 10/23/2024    PRANAV 8.6 (L) 10/23/2024       INR  INR   Date Value Ref Range Status   10/23/2024 2.26 (H) 0.85 - 1.15 Final   10/22/2024 2.30 (H) 0.85 - 1.15 Final   10/21/2024 2.40 (H) 0.85 - 1.15 Final       Data   Stroke Code Data  (for stroke code without tele)  Stroke code activated 10/23/24  0917   First stroke provider response 10/23/24  0920   Last known normal 10/23/24  0868   Time of discovery (or onset of symptoms) 10/23/24      Head CT read by  Stroke Neuro Provider 10/23/24  0939   Was stroke code de-escalated? Yes  10/23/24  0946        Clinically Significant Risk Factors Present on Admission         # Hyponatremia: Lowest Na = 129 mmol/L in last 2 days, will monitor as appropriate  # Hypochloremia: Lowest Cl = 94 mmol/L in last 2 days, will monitor as appropriate         # Drug Induced Coagulation Defect: home medication list includes an anticoagulant medication    # Hypertension: Home medication list includes antihypertensive(s)  # End stage heart failure: Ventricular assist device (VAD) present            # Financial/Environmental Concerns:     # ICD device present

## 2024-10-23 NOTE — NURSING NOTE
Chief Complaint   Patient presents with    Follow Up     Return VAD       Vitals were taken, medications reconciled.     Kentrell Aguila, Clinic Assistant    8:29 AM

## 2024-10-23 NOTE — NURSING NOTE
Patient arrived to clinic for VAD appointment and was displaying stroke like symptoms. VAD Parameters:     10/23/24 0900   Heartmate 3 LEFT VS   Flow (Lpm) 3.2 Lpm   Pulse Index (PI) 2.6 PI   Speed (rpm) 5100 rpm   Power (jackson) 3.3 jackson   Current Hct setting 32     Patient had hx to 10/22, flow range 3.2-4.3, pi 2.6-8.2 on interrogation. Patient taken Via ambulance to ED. Patient did not have backup bag from TCU - VAD coordinator accompanied patient in ambulance with VAD Controller to ED.

## 2024-10-23 NOTE — PHARMACY-ANTICOAGULATION SERVICE
Clinical Pharmacy - Warfarin Dosing Consult     Pharmacy has been consulted to manage this patient s warfarin therapy.  Indication: LVAD/RVAD  Therapy Goal: INR 2-3  Provider/Team: Federico [facility] Clinic: [facility]  Warfarin Prior to Admission: Yes  Warfarin PTA Regimen: 2.5 mg daily  Significant drug interactions: Acetominophen (may increse INR; moderate interaction) Amiodarone (will increase INR; significant interaction) Lexapro (increase risk of bleeding)  Recent documented change in oral intake/nutrition: No    INR   Date Value Ref Range Status   10/23/2024 2.26 (H) 0.85 - 1.15 Final   10/22/2024 2.30 (H) 0.85 - 1.15 Final       Recommend warfarin 2.5 mg today.  Pharmacy will monitor Duane C Johnson daily and order warfarin doses to achieve specified goal.      Please contact pharmacy as soon as possible if the warfarin needs to be held for a procedure or if the warfarin goals change.       Jt Paige Rph  PGY-1 Pharmacy Resident

## 2024-10-23 NOTE — PLAN OF CARE
Physical Therapy Discharge Summary    Reason for therapy discharge:    Pt admitted to ED after MD appt.      Progress towards therapy goal(s). See goals on Care Plan in Epic electronic health record for goal details.  Pt with some variability in performance, and generally was sba, fww.     Therapy recommendation(s):    PT when deemed medically appropriate.

## 2024-10-23 NOTE — PLAN OF CARE
Deferral - SLP orders received for swallow eval as part of stroke order set. Per discussion with RN, stroke-code deescalated. Chart reviewed and discussed with RN, met with pt. Pt denies any dysarthria. No facial droop. Pt observed drinking water - no s/sx of aspiration. SLP will defer evaluation at this time and complete orders, will page MD. Pt OK to return to baseline diet. Please reconsult SLP with changes in swallow function or communication skills.

## 2024-10-23 NOTE — PROCEDURES
The patient's HeartMate LVAD was interrogated 10/23/2024  * Speed 5100 rpm   * Pulsatility index 3.7   * Power 3.4 Dahl   * Flow 3.6 L/minute   Fluid status: slightly hypovolemic   Alarms were reviewed, and notable for occasional PI events, no power spikes, speed drops, or other findings suspicious of pump malfunction noted. Last low flow alarm 10/19.   The driveline exit site was inspected, c/d/I/   All external components were inspected and showed no evidence of damage or malfunction, none replaced.   No changes to VAD settings made

## 2024-10-23 NOTE — PROGRESS NOTES
"Pt's emergency backup back was left at TCU when pt arrived in clinic. Writer met patient in ED with \"loaner\" backup Heartmate 3 VAD controller. TCU staff will  pt-owned emergency back up bag to ED. Please page on-call VAD Coordinator when that occurs so \"loaner\" controller can be returned.    ED staff stroke coding patient. VAD tower/iPad not present yet. Per VAD Coordinator in clinic, pt had no low flows for several days and VAD interrogation was unremarkable. Doppler MAP= 40's in clinic. This update was given to pt's bedside RN, Jessi.   "

## 2024-10-23 NOTE — PLAN OF CARE
Pt had cardiology appointment this morning and left before shift change report. Pt didn't come back to TCU. Charge nurse notified assigned nurse (me) that pt had a stroke code during appointment and was transferred to ED. Pt discharged from TCU. -Melissa Gonzales RN

## 2024-10-23 NOTE — PROGRESS NOTES
Stroke Code Nurse-Responder Note    Arrival Time to Stroke Code: 0919    Stroke Code Team interventions:   - De-escalated at 0946 by Stroke Team    ED/Bedside Nurse providing handoff: McAylvinayak    Time left for CT: 0927; delayed pending HGB result    Time arrived to next location (ED/Unit/IR): 0935    ED/Bedside Nurse given handoff (name/time): McAyla [id number]    Linh Perales RN

## 2024-10-23 NOTE — LETTER
10/23/2024      RE: Duane C Johnson  [id number] [address] [place] [id number]       Dear Colleague,    Thank you for the opportunity to participate in the care of your patient, Duane C Johnson, at the [facility] [place] at [facility]. Please see a copy of my visit note below.    HPI:   Duane C Johnson is a 74 year old male pmhx of anterior STEMI s/p PCI to the pLAD on 10/26/23 with a VT/VF arrest the next day (10/27) with patent stents and unchanged anatomy on repeat angiogram. Placed on amiodarone and discharged 11/03/23, ICD was not indicated as this was within 48h of his MI. His EF prior to discharge was 20-30% with a large area of akinesis in the LAD, placed on apixaban due to this (Apixaban dcd on 7/5/24). Has had multiple admissions for HF exacerbation last year.  Admitted on 8/30/24 for CHF exacerbation with BNP 16k. CPX and RHC showed significant functional limitations due to heart failue. Underwent HM3 LVAD on 9/18/24. Discharged to ARU on 10/5/24.     In reviewing the TCU chart, MAPS have been ranging from 60-84 with well controled Hrs in the 50s-60s. For afterload his hydralazine was decreased yesterday from 100 mg q8h to 75 mg every 8 hours. He is still getting losartan 50 mg BID, although one dose was held yesterday.     Today  Today he is feeling dizzy. It started in the transport coming over. Now he is feeling nausous. Has not had a low flow alarm in 4 days. He's not sure what he is doing with therapy.     During our appointment he started complainin of new right arm weakness. No numbess or tinging. He states he did have blurry vision which is resolved.     PAST MEDICAL HISTORY:  Past Medical History:   Diagnosis Date     Benign essential hypertension      CAD (coronary artery disease)      Chronic systolic heart failure (H)      Hypertension      ST elevation myocardial infarction (STEMI), unspecified artery (H)      Ventricular  fibrillation (H)        FAMILY HISTORY:  Family History   Problem Relation Age of Onset     Other Cancer Mother      Obesity Mother      GI problems Father      Uterine Cancer Sister        SOCIAL HISTORY:  Social History     Socioeconomic History     Marital status:    Tobacco Use     Smoking status: Former     Current packs/day: 0.00     Average packs/day: 0.3 packs/day for 30.0 years (7.5 ttl pk-yrs)     Types: Cigarettes     Start date: 10/26/1993     Quit date: 10/26/2023     Years since quittin.9     Passive exposure: Past     Smokeless tobacco: Never     Tobacco comments:     Quit 10/26/2023   Vaping Use     Vaping status: Never Used   Substance and Sexual Activity     Alcohol use: Yes     Comment: 1-2 beers per day     Drug use: No     Sexual activity: Yes     Partners: Female     Birth control/protection: None   Other Topics Concern     Parent/sibling w/ CABG, MI or angioplasty before 65F 55M? No     Social Drivers of Health     Financial Resource Strain: Low Risk  (2024)    Financial Resource Strain      Within the past 12 months, have you or your family members you live with been unable to get utilities (heat, electricity) when it was really needed?: No   Food Insecurity: Low Risk  (2024)    Food Insecurity      Within the past 12 months, did you worry that your food would run out before you got money to buy more?: No      Within the past 12 months, did the food you bought just not last and you didn t have money to get more?: No   Transportation Needs: Low Risk  (2024)    Transportation Needs      Within the past 12 months, has lack of transportation kept you from medical appointments, getting your medicines, non-medical meetings or appointments, work, or from getting things that you need?: No   Physical Activity: Inactive (2023)    Exercise Vital Sign      Days of Exercise per Week: 0 days      Minutes of Exercise per Session: 0 min   Social Connections: Unknown (2023)     Social Connection and Isolation Panel [NHANES]      Marital Status:    Interpersonal Safety: High Risk (10/17/2024)    Interpersonal Safety      Do you feel physically and emotionally safe where you currently live?: No      Within the past 12 months, have you been hit, slapped, kicked or otherwise physically hurt by someone?: No      Within the past 12 months, have you been humiliated or emotionally abused in other ways by your partner or ex-partner?: No   Housing Stability: High Risk (9/1/2024)    Housing Stability      Do you have housing? : No      Are you worried about losing your housing?: No       CURRENT MEDICATIONS:  No current facility-administered medications for this visit.     No current outpatient medications on file.     Facility-Administered Medications Ordered in Other Visits   Medication Dose Route Frequency Provider Last Rate Last Admin     - Skin Test Reading -   Does not apply Q21 Days Abiola Schilling MD         acetaminophen (TYLENOL) tablet 975 mg  975 mg Oral Q8H PRN Abiola Schilling MD   975 mg at 10/21/24 2205     amiodarone (PACERONE) tablet 200 mg  200 mg Oral Daily Abiola Schilling MD   200 mg at 10/22/24 0822     atorvastatin (LIPITOR) tablet 80 mg  80 mg Oral QPM Abiola Schilling MD   80 mg at 10/22/24 2135     bisacodyl (DULCOLAX) suppository 10 mg  10 mg Rectal Daily PRN Abiola Schilling MD         COVID-19 mRNA vaccine 12+y (COMIRNATY (PFIZER_) injection 30 mcg  30 mcg Intramuscular Once Sam Tolentino MD         diphenhydrAMINE (BENADRYL) capsule 50 mg  50 mg Oral Once PRN Sam Tolentino MD        Or     diphenhydrAMINE (BENADRYL) injection 50 mg  50 mg IV/IM Once PRN Sam Tolentino MD         EPINEPHrine (ADRENALIN) kit 0.3 mg  0.3 mg Intramuscular Q5 Min PRN Sma Tolentino MD         escitalopram (LEXAPRO) tablet 10 mg  10 mg Oral or Feeding Tube Daily Abiola Schilling MD   10 mg at 10/22/24 0822     fludrocortisone (FLORINEF) tablet 0.1 mg  0.1 mg Oral Daily  Tamiko Moreira APRN CNP   0.1 mg at 10/22/24 0822     gabapentin (NEURONTIN) capsule 100 mg  100 mg Oral or Feeding Tube At Bedtime Abiola Schilling MD   100 mg at 10/22/24 2135     hydrALAZINE (APRESOLINE) tablet 75 mg  75 mg Oral Q8H Sirisha Silva APRN CNP   75 mg at 10/23/24 0553     lidocaine (LMX4) cream   Topical Q1H PRN Abiola Schilling MD         lidocaine 1 % 0.1-1 mL  0.1-1 mL Other Q1H PRN Abiola Schilling MD         losartan (COZAAR) tablet 50 mg  50 mg Oral BID Tamiko Moreira APRN CNP   50 mg at 10/22/24 2135     magnesium oxide (MAG-OX) tablet 400 mg  400 mg Oral Daily Abiola Schilling MD   400 mg at 10/22/24 0821     melatonin tablet 10 mg  10 mg Oral At Bedtime Abiola Schilling MD   10 mg at 10/22/24 2135     methocarbamol (ROBAXIN) tablet 750 mg  750 mg Oral TID Abiola Schilling MD   750 mg at 10/22/24 1909     multivitamin w/minerals (THERA-VIT-M) tablet 1 tablet  1 tablet Oral Daily Abiola Schilling MD   1 tablet at 10/22/24 0822     naloxone (NARCAN) injection 0.2 mg  0.2 mg Intravenous Q2 Min PRN Abiola Schilling MD        Or     naloxone (NARCAN) injection 0.4 mg  0.4 mg Intravenous Q2 Min PRN Abiola Schilling MD        Or     naloxone (NARCAN) injection 0.2 mg  0.2 mg Intramuscular Q2 Min PRN Abiola Schilling MD        Or     naloxone (NARCAN) injection 0.4 mg  0.4 mg Intramuscular Q2 Min PRAbiola Howe MD         Nurse may request from Pharmacy a change of form of medication (e.g. Liquid to tablet).   Does not apply Continuous PRN Abiola Schilling MD         ondansetron (ZOFRAN ODT) ODT tab 4 mg  4 mg Oral Q6H PRN Abiola Schilling MD        Or     ondansetron (ZOFRAN) injection 4 mg  4 mg Intravenous Q6H PRN Abiola Schilling MD         oxyCODONE (ROXICODONE) tablet 5 mg  5 mg Oral Q4H PRN Abiola Schilling MD         pantoprazole (PROTONIX) EC tablet 40 mg  40 mg Oral QAM AC Abiola Schilling MD   40 mg at 10/23/24 0602     Patient is already receiving anticoagulation with  heparin, enoxaparin (LOVENOX), warfarin (COUMADIN)  or other anticoagulant medication   Does not apply Continuous PRN Abiola Schilling MD         polyethylene glycol (MIRALAX) Packet 17 g  17 g Oral or Feeding Tube BID PRN Abiola Schilling MD         prochlorperazine (COMPAZINE) injection 5 mg  5 mg Intravenous Q6H PRN Abiola Schilling MD        Or     prochlorperazine (COMPAZINE) tablet 5 mg  5 mg Oral Q6H PRN Abiola Schilling MD        Or     prochlorperazine (COMPAZINE) suppository 12.5 mg  12.5 mg Rectal Q12H PRN Abiola Schilling MD         senna-docusate (SENOKOT-S/PERICOLACE) 8.6-50 MG per tablet 2 tablet  2 tablet Oral or Feeding Tube BID PRN Abiola Schilling MD         sodium chloride (PF) 0.9% PF flush 3 mL  3 mL Intracatheter q1 min prn Abiola Schilling MD         tuberculin injection 5 Units  5 Units Intradermal Q21 Days Abiola Schilling MD         Warfarin Dose Required Daily - Pharmacist Managed  1 each Oral See Admin Instructions Abiola Schilling MD           ROS:   See HPI    EXAM:  BP (!) 46/0   SpO2 98%     GENERAL: Appears pale, talkative, sitting in a wheelchair. In no obvious pain  HEENT: Eye symmetrical and without discharge or icterus bilaterally.   CV: Hum of LVAD, no adventitious sounds  RESPIRATORY: Respirations regular, even, and unlabored. Lungs CTA throughout.   GI: Soft and non distended   EXTREMITIES: No peripheral edema. Non- pulsatile.   NEUROLOGIC: Alert and orientated x 3.  New right sided pronator drift. Otherwise cranial nerves 2-12 intact. 4/5 right upper extremity strength. 5/5 left upper extremity strength. 5/5 b/l lower extremity strenght  MUSCULOSKELETAL: No joint swelling or tenderness.   SKIN: No jaundice. No rashes or lesions. Driveline dressing no assessed.    Labs - as reviewed in clinic with patient today:  CBC RESULTS:  Lab Results   Component Value Date    WBC 7.2 10/21/2024    WBC 12.2 (H) 05/26/2021    RBC 3.38 (L) 10/21/2024    RBC 3.60 (L) 05/26/2021    HGB 10.8  (L) 10/21/2024    HGB 12.0 (L) 05/26/2021    HCT 32.0 (L) 10/21/2024    HCT 34.8 (L) 05/26/2021    MCV 95 10/21/2024    MCV 97 05/26/2021    MCH 32.0 10/21/2024    MCH 33.3 (H) 05/26/2021    MCHC 33.8 10/21/2024    MCHC 34.5 05/26/2021    RDW 16.9 (H) 10/21/2024    RDW 13.2 05/26/2021     10/21/2024     05/26/2021       CMP RESULTS:  Lab Results   Component Value Date     (L) 10/21/2024     10/27/2023     05/26/2021    POTASSIUM 4.3 10/21/2024    POTASSIUM 2.5 (LL) 09/23/2024    POTASSIUM 4.5 05/26/2021    CHLORIDE 94 (L) 10/21/2024    CHLORIDE 103 10/27/2023    CHLORIDE 105 05/26/2021    CO2 28 10/21/2024    CO2 27 10/27/2023    CO2 29 05/26/2021    ANIONGAP 10 10/21/2024    ANIONGAP 7 05/26/2021    GLC 97 10/21/2024    GLC 95 10/09/2024     (H) 05/26/2021    BUN 23.6 (H) 10/21/2024    BUN 41 (H) 05/26/2021    CR 0.67 10/21/2024    CR 0.80 05/26/2021    GFRESTIMATED >90 10/21/2024    GFRESTIMATED >60 10/26/2023    GFRESTIMATED 90 05/26/2021    GFRESTBLACK >90 05/26/2021    PRANAV 8.7 (L) 10/21/2024    PRANAV 8.3 (L) 05/26/2021    BILITOTAL 0.8 10/09/2024    BILITOTAL 0.5 03/20/2018    ALBUMIN 2.6 (L) 10/09/2024    ALBUMIN 3.7 03/20/2018    ALKPHOS 113 10/09/2024    ALKPHOS 82 03/20/2018    ALT 31 10/09/2024    ALT 22 03/20/2018    AST 32 10/09/2024    AST 20 03/20/2018        INR RESULTS:  Lab Results   Component Value Date    INR 2.26 (H) 10/23/2024    INR 1.15 (H) 05/26/2021       Lab Results   Component Value Date    MAG 1.9 10/06/2024     Lab Results   Component Value Date    NTBNPI 22,172 (H) 09/04/2024     Lab Results   Component Value Date    NTBNP 15,659 (H) 08/29/2024       Assessment and Plan:   Duane C Johnson is a 74 year old male pmhx of anterior STEMI s/p PCI to the pLAD on 10/26/23 with a VT/VF arrest the next day (10/27) with patent stents and unchanged anatomy on repeat angiogram. Placed on amiodarone and discharged 11/03/23, ICD was not indicated as this was within  48h of his MI. His EF prior to discharge was 20-30% with a large area of akinesis in the LAD, placed on apixaban due to this (Apixaban dcd on 7/5/24). Has had multiple admissions for HF exacerbation last year.  Admitted on 8/30/24 for CHF exacerbation with BNP 16k. CPX and RHC showed significant functional limitations due to heart failue. Underwent HM3 LVAD on 9/18/24. Discharged to ARU on 10/5/24.     He presents today in the setting of frequent low-flow alarms, laibile BPS and intermittent dizziness.    He was dizzy on arrival, started in transport on the way over. Right arm symptoms started at ~8:45 am- with subjective weakness and was found to have a new pronator drift at that point. Repeat Map was ranging 45-48.    Note that he did have a stroke code called about 2 weeks ago for b/l arm tingling/weakness and MAP was very low at that time. Most likely this is all hypotension related, but given new neurologic findings will send to ER for full stroke code and if negative likely admission to cards 2.     For a problem based review of his recent history, please see my consult note from 10/17.    Recommendations  - 911 transfer to the [facility], stroke code  - Likely will need admission with cards 2 to optimize bp medication and fluid management given his alarms and dizziness are impairing his participation with rehab- discussed with Dr. Asencio.   - Getting IV in clinic to start IV fluids  - Discussed with TCU hospitalist MD as well- they are awre of transfer and will wait to hear more regarding admisison  - LVAD coordinator to call patient's wife to give an update    Billing  - I managed a complication of management  - I discussed care with multiple other providers (ER doctor, Dr. Asencio, TCU doctor)  - I made a decision regarding hospitalizatoin      Torrie Forrest PA-C  [facility] Card[facility]  Advanced Heart Failure          CC        Please do not hesitate to contact me if you have any questions/concerns.      Sincerely,     Torrie Forrest PA-C

## 2024-10-23 NOTE — H&P
[facility]   Cardiology II Service / Advanced Heart Failure  History & Physical    Johnson, Duane  : 1950  MRN # [id number]    ADMIT DATE: 10/23/2024    PCP: Jose Coles    CHIEF COMPLAINT: Hypotension, dizziness, RUE weakness    HPI: Duane C Johnson is a 74 year old male pmhx of anterior STEMI s/p PCI to the pLAD on 10/26/23 with a VT/VF arrest the next day (10/27) with patent stents and unchanged anatomy on repeat angiogram. Placed on amiodarone and discharged 23, ICD was not indicated as this was within 48h of his MI. His EF prior to discharge was 20-30% with a large area of akinesis in the LAD, placed on apixaban due to this (Apixaban dcd on 24). Has had multiple admissions for HF exacerbation last year.  Admitted on 24 for CHF exacerbation with BNP 16k. CPX and RHC showed significant functional limitations due to heart failue. Underwent HM3 LVAD on 24.    Mr. Aguila was discharged to ARU on 10/5/24. He has continued to have frequent low flow alarms with labile BPs and has required frequent adjustments to BP medications, initiation of florinef, as well as IVF and increased PO intake.     ASSESSMENT & PLAN:  Duane C Johnson is a 74 year old male who presents from LVAD clinic with hypotension, dizziness, and new right arm weakness. Doppler MAP in clinic ranging 45-48.     In reviewing the TCU chart, MAPS have been ranging from 60-84 with well controled HRs in the 50s-60s. For afterload his hydralazine was decreased yesterday from 100 mg q8h to 75 mg every 8 hours. He is still getting losartan 50 mg BID, although one dose was held yesterday. Last low flow alarm 10/19. He had several prolonged low flow alarms on 10/17 for which hydralazine was increased. CT-A was done on 10/17/24 with no outflow graft ostruction. The outflow graft does make an acute angle as it approaches the aorta, though it is unlikely to be impacting the low flows at this time.     Note that he did  have a stroke code called about 2 weeks ago for b/l arm tingling/weakness and MAP was very low at that time. CT head negative.     Today's Plan:  - CT head w/o contrast, CTA head neck  - TTE  - resume losartan 50 mg BID and hydralazine 37.5 mg TID  - hold jardiance   - encourage PO intake   - NPO at [facility] for [facility] 10/24      # Acute on chronic systolic heart failure secondary to ICM   # s/p HM3 LVAD as DT on 9/18/2024  # CAD s/p PCI  # History of STEMI  # s/p ICD 5/2024  # Acute Angle of Outflow Graft  Stage D. NYHA Class III.  Fluid status: euvolemic   ACEi/ARB/ARNi: resume losartan 50 BID  Vasodilator: resume hydralazine 37.5 mg q8h (pta 75 mg TID)  BB: Recent LVAD, will defer to outpatient  Aldosterone antagonist deferred while other medical therapy is prioritized  SGLT2i: hold Empagliflozin 10 mg   SCD prophylaxis: ICD  MAP: goal 65-85, review of MAPs at TCU have been at goal.  LDH trends: 233 on 10/21, stable. Monitor weekly   Anticoagulation: warfarin dosing per pharmacy, INR goal 2-3, today 2.67  Antiplatelet: ASA not indicated in LVAD population, > 6 months s/p STEMI     # Low flow alarms  # Elevated PIs  # Labile MAPs   # Suspected orthostatic hypotension    Speed optimization echo performed 10/1, speed decreased to 5100. Low flows seems releated to hypertension and some hypovolemia as well. Have improved with re-timing hydralazine and after IV fluids. CT-A was done on  10/17/24 with no outflow graft ostruction. The outflow graft does make an acute angle as it approaches the aorta, discussed with Dr. Valentine, unlikely to be impacting the low flows at this time. Last low flow alarm 10/19.   - Losartan 50 mg BID  - Hydralazine 37.5 mg q8h  - Fludrocortisone 0.1 mg daily   - Increase PO intake as above  - Plan for [facility] 10/24 for further optimization      # History of VT/VF arrest 2023  # AFib s/p DCCV 9/2024 and again 9/30/2024  - Successfully cardioverted in early September for AF. Since then EKG suggested AF on  "9/21. Tele is only available from as early as 9/22. The patient was started on hep gtt 9/21, but unclear if was in AF before this. While the patient was on hep gtt until INR was therapeutic, it is not possible to assess rhythm prior to 9/21. Systemic AC was off on 9/18-9/20. S/p MELBA and DCCV on 9/30 with conversion to sinus rhythm   - Continue amiodarone 200 mg daily   - Continue AC as above  - 10/23 device interrogation shows persistent episode of AF since 10/10 with rates      # Visual changes, resolved.   # Generalized weakness  # Headache  Stoke code called 9/29 for visual changes - right eye with decreased upper half of vision and bluish haze. Resolved after 30 minutes. Seen by opthalmology and neuro. Negative head CT and CTA head and neck. He has had 3 episodes which last about 5 minutes before resolving completely.      On 10/9 Stroke code activated due to concern of generalized weakness, numbness and headache. LKW reported as 0715 when he worked with therapies and shortly after started feeling generally weak. Then around 1500 he had onset of moderate \"tension\" type headache and tingling in the bilateral fingertips. Per RRT URIAH patient now reports tingling has resolved and headache is improving. MAP 48, INR 2.2. Given absence of focal deficits and rapidly improving symptoms, opted to cancel stroke.  CT head negative, though did show chronic maxillary sinusitis.     Stroke code called 10/23 when seen in clinic with report of new RUE weakness. He was notably hypotensive at this time with MAP ~45. Evaluated in ED, CT and CTA head and neck negative. RUE weakness resolved.   - Already on A/C  - Continue to monitor for changes     # Hypovolemia hyponatremia  Na 129, ranging 128-133.   - encourage PO intake  - daily BMP  - hold empagliflozin 10 mg daily         FEN: Combination diet  PROPHY:  warfarin  LINES:  PIV  DISPO:  ~3-5 days  CODE STATUS:  Full Code    ROS:   CONSTITUTIONAL: Denies fever, chills, " fatigue, or weight fluctuations  HEAD: Denies headache.  EENT: Denies vision changes, hearing changes, dysphagia, or sore throat.   NECK: Denies lymphadenopathy.   CV:Denies chest pain, palpitations, or shortness of breath.   PULMONARY:Denies shortness of breath, cough, or previous TB exposure.   GI:Denies nausea, vomiting, diarrhea, and abdominal pain. Bowel movements are regular.   :Denies urinary alterations, dysuria, urinary frequency, hematuria, and abnormal drainage.   EXT:Denies lower extremity edema.   SKIN:Denies abnormal rashes or lesions.   MUSCULOSKELETAL:Denies upper or lower extremity weakness and pain.   NEUROLOGIC:Denies lightheadedness, dizziness, seizures, or upper or lower extremity paresthesia.   HEMATOLOGICAL:No abnormal bruising or bleeding.   PSYCHIATRIC:Denies any mood alterations.     PMH:  Past Medical History:   Diagnosis Date    Benign essential hypertension     CAD (coronary artery disease)     Chronic systolic heart failure (H)     Hypertension     ST elevation myocardial infarction (STEMI), unspecified artery (H)     Ventricular fibrillation (H)        PSH:  Past Surgical History:   Procedure Laterality Date    ANESTHESIA CARDIOVERSION N/A 9/5/2024    Procedure: Anesthesia cardioversion;  Surgeon: GENERIC ANESTHESIA PROVIDER;  [facility]    ANESTHESIA CARDIOVERSION N/A 9/30/2024    Procedure: Anesthesia cardioversion;  Surgeon: GENERIC ANESTHESIA PROVIDER;  Location:  OR    COLONOSCOPY N/A 3/23/2023    Procedure: COLONOSCOPY, FLEXIBLE, WITH LESION REMOVAL USING SNARE;  Surgeon: Jose Faustin MD;  Location[facility]    CV CENTRAL VENOUS CATHETER PLACEMENT N/A 10/26/2023    Procedure: Central Venous Catheter Placement;  Surgeon: Rob Lyles MD;  Location: [facility] CARDIAC CATH LAB    CV CORONARY ANGIOGRAM N/A 10/27/2023    Procedure: Coronary Angiogram;  Surgeon: Rob Lyles MD;  Location: [facility] CARDIAC CATH LAB    CV CORONARY ANGIOGRAM N/A  10/26/2023    Procedure: Coronary Angiogram;  Surgeon: Rob Lyles MD;  Location:  HEART CARDIAC CATH LAB    CV LEFT HEART CATH N/A 10/26/2023    Procedure: Left Heart Catheterization;  Surgeon: Rob Lyles MD;  Location:  HEART CARDIAC CATH LAB    CV PCI N/A 10/27/2023    Procedure: Percutaneous Coronary Intervention;  Surgeon: Rob Lyles MD;  Location:  HEART CARDIAC CATH LAB    CV PCI STENT DRUG ELUTING N/A 10/26/2023    Procedure: Percutaneous Coronary Intervention Stent;  Surgeon: Rob Lyles MD;  Location:  HEART CARDIAC CATH LAB    CV RIGHT HEART CATH MEASUREMENTS RECORDED N/A 5/6/2024    Procedure: Heart Cath Right Heart Cath;  Surgeon: Rob Lyles MD;  Location:  HEART CARDIAC CATH LAB    CV RIGHT HEART CATH MEASUREMENTS RECORDED N/A 9/3/2024    Procedure: Right Heart Catheterization;  Surgeon: Aaron Mesa MD;  Location:  HEART CARDIAC CATH LAB    EP ICD INSERT SINGLE N/A 5/8/2024    Procedure: Implantable Cardioverter Defibrillator Device & Lead Implant Dual;  Surgeon: Guero Black MD;  Location:  HEART CARDIAC CATH LAB    INJECTION, HYDROGEL SPACER N/A 4/22/2024    Procedure: INJECTION, HYDROGEL SPACER AND FIDUCIAL MARKER PLACEMENT;  Surgeon: Stanislaw Barros MD;  Location:  OR    INSERT VENTRICULAR ASSIST DEVICE LEFT (HEARTMATE II) N/A 9/18/2024    Procedure: Median Sternotomy, INSERTION of LEFT VENTRICULAR ASSIST DEVICE (HEARTMATE III), cardiopulmonary bypass, transesophageal echocardiogram performed by anesthesia.;  Surgeon: Mulvihill, Michael, MD;  Location: [facility] OR    IRRIGATION AND DEBRIDEMENT CHEST WASHOUT, COMBINED Right 9/18/2024    Procedure: Exploration of chest, chest washout;  Surgeon: Mulvihill, Michael, MD;  Location: [facility] OR       MEDICATIONS:  Prior to Admission Medications   Prescriptions Last Dose Informant Patient Reported? Taking?   FISH OIL-VITAMIN D PO  Spouse/Significant Other, Self Yes No   Sig:  Take 1 capsule by mouth 2 times daily.   acetaminophen (TYLENOL) 325 MG tablet   No No   Sig: Take 2 tablets (650 mg) by mouth every 4 hours as needed for other (For optimal non-opioid multimodal pain management to improve pain control.).   amiodarone (PACERONE) 200 MG tablet  Spouse/Significant Other, Self No No   Sig: Take 1 tablet (200 mg) by mouth daily   atorvastatin (LIPITOR) 80 MG tablet  Spouse/Significant Other, Self No No   Sig: Take 1 tablet (80 mg) by mouth daily   empagliflozin (JARDIANCE) 10 MG TABS tablet  Spouse/Significant Other, Self No No   Sig: Take 1 tablet (10 mg) by mouth daily   escitalopram (LEXAPRO) 10 MG tablet   No No   Sig: Take 1 tablet (10 mg) by mouth or Feeding Tube daily.   gabapentin (NEURONTIN) 100 MG capsule   No No   Sig: Take 1 capsule (100 mg) by mouth or Feeding Tube at bedtime.   hydrALAZINE (APRESOLINE) 25 MG tablet   No No   Sig: Take 1 tablet (25 mg) by mouth 3 times daily.   losartan (COZAAR) 50 MG tablet   No No   Sig: Take 1 tablet (50 mg) by mouth 2 times daily.   magnesium hydroxide (MILK OF MAGNESIA) 400 MG/5ML suspension   No No   Sig: Take 30 mLs by mouth daily as needed for constipation (Use if polyethylene glycol (Miralax) is not effective after 24 hours.).   magnesium oxide (MAG-OX) 400 MG tablet  Spouse/Significant Other, Self No No   Sig: Take 1 tablet (400 mg) by mouth daily   melatonin 10 MG TABS tablet   No No   Sig: Take 1 tablet (10 mg) by mouth every evening.   melatonin 5 MG tablet  Spouse/Significant Other, Self No No   Sig: Take 1-2 tablets (5-10 mg) by mouth at bedtime   methocarbamol (ROBAXIN) 750 MG tablet   No No   Sig: Take 1 tablet (750 mg) by mouth 3 times daily as needed for muscle spasms.   multivitamin w/minerals (THERA-VIT-M) tablet  Spouse/Significant Other, Self Yes No   Sig: Take 1 tablet by mouth 2 times daily   multivitamin w/minerals (THERA-VIT-M) tablet   No No   Sig: Take 1 tablet by mouth daily.   warfarin ANTICOAGULANT (COUMADIN)  2.5 MG tablet   No No   Sig: Take 1 tablet (2.5 mg) by mouth daily.      Facility-Administered Medications: None        ALLERGIES:     Allergies   Allergen Reactions    Brilinta [Ticagrelor] Other (See Comments) and Difficulty breathing     Per pt and spouse, hyperventilation.    Clonazepam Other (See Comments)     Per spouse, acted like a zombie and he was shaky, could barely talk.       FAMILY HISTORY:  Family History   Problem Relation Age of Onset    Other Cancer Mother     Obesity Mother     GI problems Father     Uterine Cancer Sister        SOCIAL HISTORY:  Social History     Socioeconomic History    Marital status:      Spouse name: Not on file    Number of children: Not on file    Years of education: Not on file    Highest education level: Not on file   Occupational History    Not on file   Tobacco Use    Smoking status: Former     Current packs/day: 0.00     Average packs/day: 0.3 packs/day for 30.0 years (7.5 ttl pk-yrs)     Types: Cigarettes     Start date: 10/26/1993     Quit date: 10/26/2023     Years since quittin.9     Passive exposure: Past    Smokeless tobacco: Never    Tobacco comments:     Quit 10/26/2023   Vaping Use    Vaping status: Never Used   Substance and Sexual Activity    Alcohol use: Yes     Comment: 1-2 beers per day    Drug use: No    Sexual activity: Yes     Partners: Female     Birth control/protection: None   Other Topics Concern    Parent/sibling w/ CABG, MI or angioplasty before 65F 55M? No   Social History Narrative    Not on file     Social Drivers of Health     Financial Resource Strain: Low Risk  (2024)    Financial Resource Strain     Within the past 12 months, have you or your family members you live with been unable to get utilities (heat, electricity) when it was really needed?: No   Food Insecurity: Low Risk  (2024)    Food Insecurity     Within the past 12 months, did you worry that your food would run out before you got money to buy more?: No      Within the past 12 months, did the food you bought just not last and you didn t have money to get more?: No   Transportation Needs: Low Risk  (9/1/2024)    Transportation Needs     Within the past 12 months, has lack of transportation kept you from medical appointments, getting your medicines, non-medical meetings or appointments, work, or from getting things that you need?: No   Physical Activity: Inactive (11/16/2023)    Exercise Vital Sign     Days of Exercise per Week: 0 days     Minutes of Exercise per Session: 0 min   Stress: Not on file   Social Connections: Unknown (11/16/2023)    Social Connection and Isolation Panel [NHANES]     Frequency of Communication with Friends and Family: Not on file     Frequency of Social Gatherings with Friends and Family: Not on file     Attends Islam Services: Not on file     Active Member of Clubs or Organizations: Not on file     Attends Club or Organization Meetings: Not on file     Marital Status:    Interpersonal Safety: High Risk (10/17/2024)    Interpersonal Safety     Do you feel physically and emotionally safe where you currently live?: No     Within the past 12 months, have you been hit, slapped, kicked or otherwise physically hurt by someone?: No     Within the past 12 months, have you been humiliated or emotionally abused in other ways by your partner or ex-partner?: No   Housing Stability: High Risk (9/1/2024)    Housing Stability     Do you have housing? : No     Are you worried about losing your housing?: No       PHYSICAL EXAM:  Blood pressure (!) 80/65, pulse 115, temperature 97.9  F (36.6  C), temperature source Oral, resp. rate 16, SpO2 97%.    GENERAL: Appears comfortable, in no acute distress.   HEENT: Eye symmetrical, no discharge or icterus bilaterally. Mucous membranes moist and without lesions.  NECK: Supple, JVD 3-4 cm above clavicle.   CV: tachycardic, + LVAD hum  RESPIRATORY: Respirations regular, even, and unlabored. Lungs CTA  throughout.   GI: Soft and non distended with normoactive bowel sounds present in all quadrants. No tenderness, rebound, guarding. No organomegaly.   EXTREMITIES: no peripheral edema. 2+ bilateral pedal pulses.   NEUROLOGIC: Alert and orientated x 3. No focal deficits.   MUSCULOSKELETAL: No joint swelling or tenderness.   SKIN: No jaundice. No rashes or lesions.     LABS:  CBC:  Recent Labs   Lab Test 10/23/24  0926   WBC 8.2   RBC 3.70*   HGB 11.4*  11.6*   HCT 35.5*  34*   MCV 96   MCH 30.8   MCHC 32.1   RDW 17.2*          CMP:  Recent Labs   Lab Test 10/23/24  0927 10/23/24  0926 10/10/24  0628 10/09/24  1928   NA  --  129*  132*   < > 131*   POTASSIUM  --  3.8  4.5   < > 4.4   CHLORIDE  --  94*   < > 98   PRANAV  --  8.6*   < > 8.3*   CO2  --  23   < > 24   BUN  --  24.4*   < > 15.7   CR 0.6* 0.77   < > 0.82   GLC  --  97  108*   < > 140*   AST  --   --   --  32   ALT  --   --   --  31   BILITOTAL  --   --   --  0.8   ALBUMIN  --   --   --  2.6*   PROTTOTAL  --   --   --  5.9*   ALKPHOS  --   --   --  113    < > = values in this interval not displayed.       IMAGING:    Recent Results (from the past 4320 hours)   Echocardiogram Complete - For age > 60 yrs   Result Value    LVEF  <20%    [facility]    [id number]  [facility]  [id number]  [id number]^DENISE^CLOTILDE     [facility],[place]  Echocardiography Laboratory  [address] [place] [id number]     Name: JOHNSON, DUANE C  MRN: [id number]  : 1950  Study Date: 10/23/2024 10:03 AM  Age: 74 yrs  Gender: Male  Patient Location: [facility]  Reason For Study: Cerebrovascular Incident  Ordering Physician: CLOTILDE WALKER  Performed By: Luther Tidwell     BSA: 1.7 m2  Height: 66 in  Weight: 136 lb  ______________________________________________________________________________  Procedure  LVAD Echocardiogram with two-dimensional, color and spectral  Doppler  performed.  ______________________________________________________________________________  Interpretation Summary  LVAD cannula was seen in the expected anatomic position in the LV apex.  HM3 at 5100RPM.  LVIDd 47mm.  Septum normal.  Aortic valve open intermittently. No AI.  Normal flow velocities.  Global right ventricular function is mildly to moderately reduced.  Small circumferential pericardial effusion is present without any hemodynamic  significance.  ______________________________________________________________________________  Left Ventricle  The visual ejection fraction is <20%. LVAD cannula was seen in the expected  anatomic position in the LV apex. HM3 at 5100RPM.  LVIDd 47mm.  Septum normal.  Aortic valve open intermittently. No AI.  Normal flow velocities.     Right Ventricle  Global right ventricular function is mildly to moderately reduced. A pacemaker  lead is noted in the right ventricle.     Vessels  IVC diameter >2.1 cm collapsing <50% with sniff suggests a high RA pressure  estimated at 15 mmHg or greater.     Pericardium  Small circumferential pericardial effusion is present without any hemodynamic  significance.  ______________________________________________________________________________  MMode/2D Measurements & Calculations     IVSd: 1.3 cm  LVIDd: 4.7 cm  LVIDs: 4.0 cm  LVPWd: 1.3 cm  FS: 14.8 %  LV mass(C)d: 230.6 grams  LV mass(C)dI: 135.8 grams/m2  RWT: 0.55     Doppler Measurements & Calculations  TR max francisco j: 226.4 cm/sec  TR max P.5 mmHg     ______________________________________________________________________________  Report approved by: Eusebio Terry 10/23/2024 11:58 AM             Time/Communication  I personally spent a total of 65 minutes. Of that 30 minutes was counseling/coordination of patient's care. Plan of care discussed with patient. See my note above for details.      Sirisha Arias, CHICO, NP-C  Advanced Heart Failure/Cardiology II Service  Vocera  Preferred or Pager [phone]  10/23/2024

## 2024-10-24 ENCOUNTER — APPOINTMENT (OUTPATIENT)
Dept: OCCUPATIONAL THERAPY | Facility: CLINIC | Age: 74
DRG: 286 | End: 2024-10-24
Payer: MEDICARE

## 2024-10-24 ENCOUNTER — APPOINTMENT (OUTPATIENT)
Dept: CT IMAGING | Facility: CLINIC | Age: 74
DRG: 286 | End: 2024-10-24
Payer: MEDICARE

## 2024-10-24 ENCOUNTER — APPOINTMENT (OUTPATIENT)
Dept: PHYSICAL THERAPY | Facility: CLINIC | Age: 74
DRG: 286 | End: 2024-10-24
Payer: MEDICARE

## 2024-10-24 PROBLEM — I95.89 OTHER SPECIFIED HYPOTENSION: Status: ACTIVE | Noted: 2024-10-24

## 2024-10-24 LAB
ANION GAP SERPL CALCULATED.3IONS-SCNC: 6 MMOL/L (ref 7–15)
BASOPHILS # BLD AUTO: 0 10E3/UL (ref 0–0.2)
BASOPHILS NFR BLD AUTO: 1 %
BUN SERPL-MCNC: 23.9 MG/DL (ref 8–23)
CALCIUM SERPL-MCNC: 8.5 MG/DL (ref 8.8–10.4)
CHLORIDE SERPL-SCNC: 99 MMOL/L (ref 98–107)
CREAT SERPL-MCNC: 0.68 MG/DL (ref 0.67–1.17)
CRP SERPL-MCNC: 9.9 MG/L
EGFRCR SERPLBLD CKD-EPI 2021: >90 ML/MIN/1.73M2
ENTEROCOCCUS FAECALIS: NOT DETECTED
ENTEROCOCCUS FAECIUM: NOT DETECTED
EOSINOPHIL # BLD AUTO: 0.1 10E3/UL (ref 0–0.7)
EOSINOPHIL NFR BLD AUTO: 1 %
ERYTHROCYTE [DISTWIDTH] IN BLOOD BY AUTOMATED COUNT: 16.9 % (ref 10–15)
GLUCOSE BLDC GLUCOMTR-MCNC: 120 MG/DL (ref 70–99)
GLUCOSE BLDC GLUCOMTR-MCNC: 86 MG/DL (ref 70–99)
GLUCOSE BLDC GLUCOMTR-MCNC: 92 MG/DL (ref 70–99)
GLUCOSE SERPL-MCNC: 91 MG/DL (ref 70–99)
HCO3 SERPL-SCNC: 26 MMOL/L (ref 22–29)
HCT VFR BLD AUTO: 31.3 % (ref 40–53)
HGB BLD-MCNC: 9.9 G/DL (ref 13.3–17.7)
HOLD SPECIMEN: NORMAL
IMM GRANULOCYTES # BLD: 0 10E3/UL
IMM GRANULOCYTES NFR BLD: 0 %
INR PPP: 2.42 (ref 0.85–1.15)
LISTERIA SPECIES (DETECTED/NOT DETECTED): NOT DETECTED
LYMPHOCYTES # BLD AUTO: 0.5 10E3/UL (ref 0.8–5.3)
LYMPHOCYTES NFR BLD AUTO: 9 %
MCH RBC QN AUTO: 30.7 PG (ref 26.5–33)
MCHC RBC AUTO-ENTMCNC: 31.6 G/DL (ref 31.5–36.5)
MCV RBC AUTO: 97 FL (ref 78–100)
MONOCYTES # BLD AUTO: 0.6 10E3/UL (ref 0–1.3)
MONOCYTES NFR BLD AUTO: 11 %
NEUTROPHILS # BLD AUTO: 4.5 10E3/UL (ref 1.6–8.3)
NEUTROPHILS NFR BLD AUTO: 78 %
NRBC # BLD AUTO: 0 10E3/UL
NRBC BLD AUTO-RTO: 0 /100
PLATELET # BLD AUTO: 199 10E3/UL (ref 150–450)
POTASSIUM SERPL-SCNC: 4.2 MMOL/L (ref 3.4–5.3)
RBC # BLD AUTO: 3.22 10E6/UL (ref 4.4–5.9)
SODIUM SERPL-SCNC: 131 MMOL/L (ref 135–145)
STAPHYLOCOCCUS AUREUS: NOT DETECTED
STAPHYLOCOCCUS EPIDERMIDIS: DETECTED
STAPHYLOCOCCUS LUGDUNENSIS: NOT DETECTED
STREPTOCOCCUS AGALACTIAE: NOT DETECTED
STREPTOCOCCUS ANGINOSUS GROUP: NOT DETECTED
STREPTOCOCCUS PNEUMONIAE: NOT DETECTED
STREPTOCOCCUS PYOGENES: NOT DETECTED
STREPTOCOCCUS SPECIES: NOT DETECTED
WBC # BLD AUTO: 5.8 10E3/UL (ref 4–11)

## 2024-10-24 PROCEDURE — 84460 ALANINE AMINO (ALT) (SGPT): CPT | Performed by: PHYSICIAN ASSISTANT

## 2024-10-24 PROCEDURE — 93451 RIGHT HEART CATH: CPT | Mod: 26 | Performed by: INTERNAL MEDICINE

## 2024-10-24 PROCEDURE — 70450 CT HEAD/BRAIN W/O DYE: CPT | Mod: MG

## 2024-10-24 PROCEDURE — 93451 RIGHT HEART CATH: CPT | Performed by: INTERNAL MEDICINE

## 2024-10-24 PROCEDURE — 4A023N6 MEASUREMENT OF CARDIAC SAMPLING AND PRESSURE, RIGHT HEART, PERCUTANEOUS APPROACH: ICD-10-PCS | Performed by: INTERNAL MEDICINE

## 2024-10-24 PROCEDURE — 85025 COMPLETE CBC W/AUTO DIFF WBC: CPT

## 2024-10-24 PROCEDURE — 36415 COLL VENOUS BLD VENIPUNCTURE: CPT | Performed by: NURSE PRACTITIONER

## 2024-10-24 PROCEDURE — 83615 LACTATE (LD) (LDH) ENZYME: CPT | Performed by: PHYSICIAN ASSISTANT

## 2024-10-24 PROCEDURE — 272N000001 HC OR GENERAL SUPPLY STERILE: Performed by: INTERNAL MEDICINE

## 2024-10-24 PROCEDURE — G1010 CDSM STANSON: HCPCS | Performed by: RADIOLOGY

## 2024-10-24 PROCEDURE — 36415 COLL VENOUS BLD VENIPUNCTURE: CPT

## 2024-10-24 PROCEDURE — 97110 THERAPEUTIC EXERCISES: CPT | Mod: GP

## 2024-10-24 PROCEDURE — 97162 PT EVAL MOD COMPLEX 30 MIN: CPT | Mod: GP

## 2024-10-24 PROCEDURE — 80053 COMPREHEN METABOLIC PANEL: CPT | Performed by: NURSE PRACTITIONER

## 2024-10-24 PROCEDURE — C1751 CATH, INF, PER/CENT/MIDLINE: HCPCS | Performed by: INTERNAL MEDICINE

## 2024-10-24 PROCEDURE — 250N000013 HC RX MED GY IP 250 OP 250 PS 637: Performed by: NURSE PRACTITIONER

## 2024-10-24 PROCEDURE — 87040 BLOOD CULTURE FOR BACTERIA: CPT

## 2024-10-24 PROCEDURE — 97165 OT EVAL LOW COMPLEX 30 MIN: CPT | Mod: GO

## 2024-10-24 PROCEDURE — 86140 C-REACTIVE PROTEIN: CPT

## 2024-10-24 PROCEDURE — 97530 THERAPEUTIC ACTIVITIES: CPT | Mod: GO

## 2024-10-24 PROCEDURE — 97116 GAIT TRAINING THERAPY: CPT | Mod: GP

## 2024-10-24 PROCEDURE — 70450 CT HEAD/BRAIN W/O DYE: CPT | Mod: 26 | Performed by: RADIOLOGY

## 2024-10-24 PROCEDURE — 250N000009 HC RX 250: Performed by: INTERNAL MEDICINE

## 2024-10-24 PROCEDURE — 97530 THERAPEUTIC ACTIVITIES: CPT | Mod: GP

## 2024-10-24 PROCEDURE — 120N000003 HC R&B IMCU UMMC

## 2024-10-24 PROCEDURE — 99291 CRITICAL CARE FIRST HOUR: CPT | Mod: 24 | Performed by: INTERNAL MEDICINE

## 2024-10-24 PROCEDURE — 250N000013 HC RX MED GY IP 250 OP 250 PS 637

## 2024-10-24 PROCEDURE — 82565 ASSAY OF CREATININE: CPT | Performed by: PHYSICIAN ASSISTANT

## 2024-10-24 PROCEDURE — 97535 SELF CARE MNGMENT TRAINING: CPT | Mod: GO

## 2024-10-24 PROCEDURE — 80048 BASIC METABOLIC PNL TOTAL CA: CPT | Performed by: NURSE PRACTITIONER

## 2024-10-24 PROCEDURE — 85610 PROTHROMBIN TIME: CPT | Performed by: NURSE PRACTITIONER

## 2024-10-24 PROCEDURE — 93750 INTERROGATION VAD IN PERSON: CPT | Performed by: INTERNAL MEDICINE

## 2024-10-24 PROCEDURE — 82374 ASSAY BLOOD CARBON DIOXIDE: CPT | Performed by: PHYSICIAN ASSISTANT

## 2024-10-24 RX ORDER — FLUDROCORTISONE ACETATE 0.1 MG/1
0.1 TABLET ORAL DAILY
Status: ON HOLD | COMMUNITY
End: 2024-11-09

## 2024-10-24 RX ORDER — HYDRALAZINE HYDROCHLORIDE 25 MG/1
25 TABLET, FILM COATED ORAL 3 TIMES DAILY
Status: DISCONTINUED | OUTPATIENT
Start: 2024-10-24 | End: 2024-10-24

## 2024-10-24 RX ORDER — WARFARIN SODIUM 2 MG/1
2 TABLET ORAL
Status: COMPLETED | OUTPATIENT
Start: 2024-10-24 | End: 2024-10-24

## 2024-10-24 RX ORDER — HYDRALAZINE HYDROCHLORIDE 100 MG/1
100 TABLET, FILM COATED ORAL 3 TIMES DAILY
Status: ON HOLD | COMMUNITY
End: 2024-10-24

## 2024-10-24 RX ORDER — HYDRALAZINE HYDROCHLORIDE 25 MG/1
75 TABLET, FILM COATED ORAL 3 TIMES DAILY
Status: ON HOLD | COMMUNITY
End: 2024-11-09

## 2024-10-24 RX ORDER — PANTOPRAZOLE SODIUM 40 MG/1
40 TABLET, DELAYED RELEASE ORAL
Status: ON HOLD | COMMUNITY
End: 2024-11-09

## 2024-10-24 RX ORDER — SPIRONOLACTONE 25 MG
12.5 TABLET ORAL DAILY
Status: DISCONTINUED | OUTPATIENT
Start: 2024-10-25 | End: 2024-10-30

## 2024-10-24 RX ADMIN — LOSARTAN POTASSIUM 50 MG: 50 TABLET, FILM COATED ORAL at 09:15

## 2024-10-24 RX ADMIN — Medication 37.5 MG: at 14:51

## 2024-10-24 RX ADMIN — ATORVASTATIN CALCIUM 80 MG: 80 TABLET, FILM COATED ORAL at 21:02

## 2024-10-24 RX ADMIN — LOSARTAN POTASSIUM 50 MG: 50 TABLET, FILM COATED ORAL at 21:02

## 2024-10-24 RX ADMIN — ESCITALOPRAM OXALATE 10 MG: 10 TABLET ORAL at 09:16

## 2024-10-24 RX ADMIN — FLUDROCORTISONE ACETATE 0.1 MG: 0.1 TABLET ORAL at 09:15

## 2024-10-24 RX ADMIN — WARFARIN SODIUM 2 MG: 2 TABLET ORAL at 17:24

## 2024-10-24 RX ADMIN — Medication 1 TABLET: at 09:16

## 2024-10-24 RX ADMIN — Medication 37.5 MG: at 21:01

## 2024-10-24 RX ADMIN — HYDRALAZINE HYDROCHLORIDE 25 MG: 25 TABLET ORAL at 09:15

## 2024-10-24 RX ADMIN — AMIODARONE HYDROCHLORIDE 200 MG: 200 TABLET ORAL at 09:15

## 2024-10-24 RX ADMIN — MAGNESIUM OXIDE TAB 400 MG (241.3 MG ELEMENTAL MG) 400 MG: 400 (241.3 MG) TAB at 09:16

## 2024-10-24 RX ADMIN — GABAPENTIN 100 MG: 100 CAPSULE ORAL at 21:02

## 2024-10-24 ASSESSMENT — ACTIVITIES OF DAILY LIVING (ADL)
ADLS_ACUITY_SCORE: 20.25
ADLS_ACUITY_SCORE: 0
ADLS_ACUITY_SCORE: 0
ADLS_ACUITY_SCORE: 20.25
DEPENDENT_IADLS:: INDEPENDENT
ADLS_ACUITY_SCORE: 20.25

## 2024-10-24 NOTE — PROGRESS NOTES
[facility]   Cardiology II Service / Advanced Heart Failure  Daily Progress Note      Patient: Duane C Johnson  MRN: [id number]  Admission Date: 10/23/2024  Hospital Day # 1    Assessment and Plan: Duane C Johnson is a 74 year old male who presents from LVAD clinic with hypotension, dizziness, and new right arm weakness. Doppler MAP in clinic ranging 45-48.      In reviewing the TCU chart, MAPS have been ranging from 60-84 with well controled HRs in the 50s-60s. For afterload his hydralazine was decreased yesterday from 100 mg q8h to 75 mg every 8 hours. He is still getting losartan 50 mg BID, although one dose was held yesterday. Last low flow alarm 10/19. He had several prolonged low flow alarms on 10/17 for which hydralazine was increased. CT-A was done on 10/17/24 with no outflow graft ostruction. The outflow graft does make an acute angle as it approaches the aorta, though it is unlikely to be impacting the low flows at this time.      Note that he did have a stroke code called about 2 weeks ago for b/l arm tingling/weakness and MAP was very low at that time. CT head negative.       Today's Plan:  - resume hydralazine 25 mg TID with hold parameters, plan to increase to 37.5 mg if MAPs allow   - RHC this afternoon       # Acute on chronic systolic heart failure secondary to ICM   # s/p HM3 LVAD as DT on 9/18/2024  # CAD s/p PCI  # History of STEMI  # s/p ICD 5/2024  # Acute Angle of Outflow Graft  Stage D. NYHA Class III.  Fluid status: euvolemic   ACEi/ARB/ARNi: resume losartan 50 BID  Vasodilator: resume hydralazine 25 mg q8h (pta 75 mg TID), will increase to 37.5 if MAPs allow   BB: Recent LVAD, will defer to outpatient  Aldosterone antagonist deferred while other medical therapy is prioritized  SGLT2i: hold Empagliflozin 10 mg   SCD prophylaxis: ICD  MAP: goal 65-85, elevated this morning   LDH trends: 233 on 10/21, stable. Monitor weekly   Anticoagulation: warfarin dosing per pharmacy,  INR goal 2-3, today 2.67  Antiplatelet: ASA not indicated in LVAD population, > 6 months s/p STEMI     # Low flow alarms  # Elevated PIs  # Labile MAPs   # Suspected orthostatic hypotension    Speed optimization echo performed 10/1, speed decreased to 5100. Low flows seems releated to hypertension and some hypovolemia as well. Have improved with re-timing hydralazine and after IV fluids. CT-A was done on  10/17/24 with no outflow graft ostruction. The outflow graft does make an acute angle as it approaches the aorta, discussed with Dr. Valentine, unlikely to be impacting the low flows at this time.   Several low flow alarms 10/24 in the AM. MAP . Overnight hydralazine was held for  MAP of 70-unclear why med was held.   - Losartan 50 mg BID  - Hydralazine 37.5 mg q8h  - Fludrocortisone 0.1 mg daily   - Increase PO intake as above  - Plan for RHC 10/24 for further optimization      # History of VT/VF arrest 2023  # AFib s/p DCCV 9/2024 and again 9/30/2024  - Successfully cardioverted in early September for AF. Since then EKG suggested AF on 9/21. Tele is only available from as early as 9/22. The patient was started on hep gtt 9/21, but unclear if was in AF before this. While the patient was on hep gtt until INR was therapeutic, it is not possible to assess rhythm prior to 9/21. Systemic AC was off on 9/18-9/20. S/p MELBA and DCCV on 9/30 with conversion to sinus rhythm   - Continue amiodarone 200 mg daily   - Continue AC as above  - 10/23 device interrogation shows persistent episode of AF since 10/10 with rates      # Visual changes, resolved.   # Generalized weakness  # Headache  Stoke code called 9/29 for visual changes - right eye with decreased upper half of vision and bluish haze. Resolved after 30 minutes. Seen by opthalmology and neuro. Negative head CT and CTA head and neck. He has had 3 episodes which last about 5 minutes before resolving completely.      On 10/9 Stroke code activated due to concern of  "generalized weakness, numbness and headache. LKW reported as 0715 when he worked with therapies and shortly after started feeling generally weak. Then around 1500 he had onset of moderate \"tension\" type headache and tingling in the bilateral fingertips. Per RRT URIHA patient now reports tingling has resolved and headache is improving. MAP 48, INR 2.2. Given absence of focal deficits and rapidly improving symptoms, opted to cancel stroke.  CT head negative, though did show chronic maxillary sinusitis.      Stroke code called 10/23 when seen in clinic with report of new RUE weakness. He was notably hypotensive at this time with MAP ~45. Evaluated in ED, CT and CTA head and neck negative. RUE weakness resolved.   - Already on A/C  - Continue to monitor for changes     # Hypovolemia hyponatremia  Na 131, ranging 128-133.   - encourage PO intake  - daily BMP  - hold empagliflozin 10 mg daily     FEN: Combination diet  PROPHY:  warfarin  LINES:  PIV  DISPO:  2-3 days  CODE STATUS:  Full Code      Time/Communication  I spent 45 minutes reviewing results, care team notes, meeting directly with the patient, coordinating care, and completing documentation on the day of service.     Patient discussed with Dr. Asencio.        Sirisha Arias, CHICO, FNP-C  Advanced Heart Failure/Cardiology II Service  Vocera Preferred or Pager [phone]  ================================================================    Subjective/24-Hr Events:   Last 24 hr care team notes reviewed. Several low flows this morning that occurred with elevated MAPs (). Asymptomatic at time of low flows. Breathing comfortable. Denies lightheadedness/dizziness, but has not been out of bed since admission. No pain. Denies HA, numbness, weakness. No s/s of bleeding.     ROS:  4 point ROS including respiratory, CV, GI and  (other than that noted in the HPI) is negative.     Medications: Reviewed in EPIC.     Physical Exam:   BP (!) 80/65   Pulse 100   Temp 98.1  F " "(36.7  C) (Oral)   Resp 16   Ht 1.702 m (5' 7\")   Wt 64.9 kg (143 lb)   SpO2 99%   BMI 22.40 kg/m      GENERAL: Appears comfortable, in no distress.  HEENT: Eye symmetrical, no discharge or icterus bilaterally. Mucous membranes moist and without lesions.  NECK: Supple, JVD at clavicle at 90 degrees.   CV: tachycardic, irregular, +LVAD hum   RESPIRATORY: Respirations regular, even, and unlabored. Lungs CTA throughout.    GI: Soft and non distended with normoactive bowel sounds present in all quadrants. No tenderness, rebound, guarding.   EXTREMITIES: No peripheral edema. 2+ bilateral pedal pulses.   NEUROLOGIC: Alert and oriented x 3. No focal deficits.   MUSCULOSKELETAL: No joint swelling or tenderness.   SKIN: No jaundice. No rashes or lesions.     Labs:  CMP  Recent Labs   Lab 10/24/24  0626 10/24/24  0534 10/23/24  2233 10/23/24  1137 10/23/24  0927 10/23/24  0926 10/23/24  0917 10/21/24  0548   NA  --  131*  --   --   --  129*  132*  --  132*   POTASSIUM  --  4.2  --   --   --  3.8  4.5  --  4.3   CHLORIDE  --  99  --   --   --  94*  --  94*   CO2  --  26  --   --   --  23  --  28   ANIONGAP  --  6*  --   --   --  12  --  10   GLC 86 91 109*  --   --  97  108*   < > 97   BUN  --  23.9*  --   --   --  24.4*  --  23.6*   CR  --  0.68  --   --  0.6* 0.77  --  0.67   GFRESTIMATED  --  >90  --   --  >60 >90  --  >90   PRANAV  --  8.5*  --   --   --  8.6*  --  8.7*   MAG  --   --   --  1.8  --  1.9  --   --     < > = values in this interval not displayed.       CBC  Recent Labs   Lab 10/23/24  0926 10/21/24  0548   WBC 8.2 7.2   RBC 3.70* 3.38*   HGB 11.4*  11.6* 10.8*   HCT 35.5*  34* 32.0*   MCV 96 95   MCH 30.8 32.0   MCHC 32.1 33.8   RDW 17.2* 16.9*    205       INR  Recent Labs   Lab 10/24/24  0534 10/23/24  0804 10/22/24  0712 10/21/24  0548   INR 2.42* 2.26* 2.30* 2.40*       "

## 2024-10-24 NOTE — PROCEDURES
The patient's HeartMate LVAD was interrogated 10/24/2024  * Speed 5100 rpm   * Pulsatility index 5.0   * Power 3.3 Dahl   * Flow 3.4 L/minute   Fluid status: euvolemic to slightly hypovolemic   Alarms were reviewed, and notable for several low flow alarms this morning (3931-8751) that were associated with MAPs .  No speed drops or other findings suspicious of pump malfunction noted   The driveline exit site was inspected, c/d/i.   All external components were inspected and showed no evidence of damage or malfunction, none replaced.   No changes to VAD settings made

## 2024-10-24 NOTE — CONSULTS
Care Management Initial Consult    General Information  Assessment completed with: Patient,    Type of CM/SW Visit: Initial Assessment    Primary Care Provider verified and updated as needed: Yes   Readmission within the last 30 days: no previous admission in last 30 days      Reason for Consult: utilization management concerns, discharge planning  Advance Care Planning: Advance Care Planning Reviewed: no concerns identified          Communication Assessment  Patient's communication style: spoken language (English or Bilingual)    Hearing Difficulty or Deaf: no   Wear Glasses or Blind: other (see comments) (reading glasses)    Cognitive  Cognitive/Neuro/Behavioral: WDL  Level of Consciousness: alert  Arousal Level: opens eyes spontaneously  Orientation: oriented x 4  Mood/Behavior: calm, cooperative  Best Language: 0 - No aphasia  Speech: clear, logical, spontaneous    Living Environment:   People in home: spouse     Current living Arrangements: house      Able to return to prior arrangements: yes       Family/Social Support:  Care provided by: spouse/significant other  Provides care for: no one  Marital Status:   Support system:            Description of Support System:           Current Resources:   Patient receiving home care services: No        Community Resources: None  Equipment currently used at home: walker, rolling, shower chair, grab bar, tub/shower  Supplies currently used at home: None    Employment/Financial:  Employment Status: retired        Financial Concerns: none           Does the patient's insurance plan have a 3 day qualifying hospital stay waiver?  Yes     Which insurance plan 3 day waiver is available? [employer]    Will the waiver be used for post-acute placement? No    Lifestyle & Psychosocial Needs:  Social Drivers of Health     Food Insecurity: Low Risk  (10/23/2024)    Food Insecurity     Within the past 12 months, did you worry that your food would run out before you got money to  buy more?: No     Within the past 12 months, did the food you bought just not last and you didn t have money to get more?: No   Depression: Not at risk (7/5/2024)    PHQ-2     PHQ-2 Score: 0   Housing Stability: Low Risk  (10/23/2024)    Housing Stability     Do you have housing? : Yes     Are you worried about losing your housing?: No   Recent Concern: Housing Stability - High Risk (9/1/2024)    Housing Stability     Do you have housing? : No     Are you worried about losing your housing?: No   Tobacco Use: Medium Risk (10/23/2024)    Patient History     Smoking Tobacco Use: Former     Smokeless Tobacco Use: Never     Passive Exposure: Past   Financial Resource Strain: Low Risk  (10/23/2024)    Financial Resource Strain     Within the past 12 months, have you or your family members you live with been unable to get utilities (heat, electricity) when it was really needed?: No   Alcohol Use: Not on file   Transportation Needs: Low Risk  (10/23/2024)    Transportation Needs     Within the past 12 months, has lack of transportation kept you from medical appointments, getting your medicines, non-medical meetings or appointments, work, or from getting things that you need?: No   Physical Activity: Inactive (11/16/2023)    Exercise Vital Sign     Days of Exercise per Week: 0 days     Minutes of Exercise per Session: 0 min   Interpersonal Safety: Low Risk  (10/23/2024)    Interpersonal Safety     Do you feel physically and emotionally safe where you currently live?: Yes     Within the past 12 months, have you been hit, slapped, kicked or otherwise physically hurt by someone?: No     Within the past 12 months, have you been humiliated or emotionally abused in other ways by your partner or ex-partner?: No   Recent Concern: Interpersonal Safety - High Risk (10/17/2024)    Interpersonal Safety     Do you feel physically and emotionally safe where you currently live?: No     Within the past 12 months, have you been hit, slapped,  kicked or otherwise physically hurt by someone?: No     Within the past 12 months, have you been humiliated or emotionally abused in other ways by your partner or ex-partner?: No   Stress: Not on file   Social Connections: Unknown (11/16/2023)    Social Connection and Isolation Panel [NHANES]     Frequency of Communication with Friends and Family: Not on file     Frequency of Social Gatherings with Friends and Family: Not on file     Attends Temple Services: Not on file     Active Member of Clubs or Organizations: Not on file     Attends Club or Organization Meetings: Not on file     Marital Status:    Health Literacy: Not on file       Functional Status:  Prior to admission patient needed assistance:   Dependent ADLs:: Ambulation-walker  Dependent IADLs:: Independent       Mental Health Status:          Chemical Dependency Status:                Values/Beliefs:  Spiritual, Cultural Beliefs, Temple Practices, Values that affect care: no               Discussed  Partnership in Safe Discharge Planning  document with patient/family: Yes    Additional Information:  RNCC met with patient bedside, completed case management assessment per orders and for discharge planning needs, HC.    Patient lives at home, a house with his spouse Melissa in [place], [place]. He states he has stair lifts, grab bars, shower with an acceptable chair and his wife works from home and should be available nearly the entire day, works for [employer]. 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     Patient is wanting to change INR location to [facility] as it's closer to home. RNCC making referral to that clinic to change location, will schedule after visit INR closer to discharge.     Patient has been accepted for HC PT/OT/RN by [facility] from prior admission, has HC recs per therapy discussion this morning. RNCC placing HC orders.     Next Steps:  Update [facility] prior to discharge.    [facility]  Care  Phone: [phone]  Fax: [phone]  HC RN/PT/OT    INR appointment scheduled: Monday 10/28/24 at 11:30 a.m., [facility]      SANDIE DelgadoN, BA, RN, CMSRN, RNCC  [facility]  Covering Units 6C Beds 7740-3562/OBS  Phone: [phone]  Available on [employer] search 6C [id number] RNCC or OBS RNCC  After Hours [phone]     6C Beds 6395-8877 SW Ph: [phone]     6B/CRNCC Weekend/holiday on [employer] or [phone]     [facility] RNCC ED/5 Ortho/5 Med/Surg [phone]     [facility] RNCC 6 Med/Surg 8A, [phone]     Observation SW and weekend/after hours phone: [phone]

## 2024-10-24 NOTE — PLAN OF CARE
Neuro: A&Ox4  CV: Reported to be in afib in ED, noted to be in sinus tach by writer with confirmation from monitor tech around midnight (notified provider); afebrile, MAPs 70-98  Resp: Sats high 90s on room air  GI/: Voiding spontaneously, last BM 10/21 per pt  Diet: NPO at midnight for RHC  Activity: Up with standby assist to assist x1  Pain: No complaints of pain  Skin: See flowsheets and admission note  Access: R PIV and L PIV  Electrolytes: Not on RN managed protocols, mag due at 0800      Problem: Adult Inpatient Plan of Care  Goal: Plan of Care Review  Description: The Plan of Care Review/Shift note should be completed every shift.  The Outcome Evaluation is a brief statement about your assessment that the patient is improving, declining, or no change.  This information will be displayed automatically on your shift  note.  Outcome: Progressing  Flowsheets (Taken 10/24/2024 0301)  Outcome Evaluation: LVAD numbers WNL, MAPs >60  Plan of Care Reviewed With: patient  Overall Patient Progress: no change   Goal Outcome Evaluation:      Plan of Care Reviewed With: patient    Overall Patient Progress: no changeOverall Patient Progress: no change    Outcome Evaluation: LVAD numbers WNL, MAPs >60

## 2024-10-24 NOTE — PLAN OF CARE
Neuro: A&Ox4.   Cardiac: A flutter, 10% v paced. HM3 - MAPS   Respiratory: Sating 98 on RA.  GI/: Adequate urine output. No bm this shift  Diet/appetite: NPO diet.  Activity:  Ax1 w/ gb & walker, up in halls.  Pain: denies.   Skin: LVAD - dressing changed   LDA's:  PIV   H3 LVAD   Plan:   Lvad coordinator paged due to pt needing more education when connecting to wall vs battery power.  RHC today. Continue with POC. Notify primary team with changes.

## 2024-10-24 NOTE — PHARMACY-ADMISSION MEDICATION HISTORY
Pharmacy Intern Admission Medication History    Admission medication history is complete. The information provided in this note is only as accurate as the sources available at the time of the update.    Information Source(s): Facility (TCU/NH/) medication list/MAR via N/A    Pertinent Information: Per the TCU's MAR, hydralazine was increased to 100 mg TID, then decreased to 75 mg TID on 10/22. He has been taking warfarin 2.5 mg daily. Jardiance was held on 10/17 for hypovolemia.     Changes made to PTA medication list:  Added: fludrocortisone, pantoprazole  Deleted: fish oil-vit D, milk of magnesia, melatonin 5mg  Changed: hydralazine    Allergies reviewed with patient and updates made in EHR: yes    Medication History Completed By: Anne Curtis 10/24/2024 10:06 AM    PTA Med List   Medication Sig Last Dose/Taking    acetaminophen (TYLENOL) 325 MG tablet Take 2 tablets (650 mg) by mouth every 4 hours as needed for other (For optimal non-opioid multimodal pain management to improve pain control.). 10/21/2024 at 10:05 PM    amiodarone (PACERONE) 200 MG tablet Take 1 tablet (200 mg) by mouth daily 10/22/2024 at  8:22 AM    atorvastatin (LIPITOR) 80 MG tablet Take 1 tablet (80 mg) by mouth daily 10/22/2024 at  9:35 PM    empagliflozin (JARDIANCE) 10 MG TABS tablet Take 1 tablet (10 mg) by mouth daily 10/17/2024 at  8:46 AM    escitalopram (LEXAPRO) 10 MG tablet Take 1 tablet (10 mg) by mouth or Feeding Tube daily. 10/22/2024 at  8:22 AM    fludrocortisone (FLORINEF) 0.1 MG tablet Take 0.1 mg by mouth daily. 10/22/2024 at  8:22 AM    gabapentin (NEURONTIN) 100 MG capsule Take 1 capsule (100 mg) by mouth or Feeding Tube at bedtime. 10/22/2024 at  9:35 PM    hydrALAZINE (APRESOLINE) 25 MG tablet Take 75 mg by mouth 3 times daily. 10/23/2024 at  5:53 AM    losartan (COZAAR) 50 MG tablet Take 1 tablet (50 mg) by mouth 2 times daily. 10/22/2024 at  9:35 PM    magnesium oxide (MAG-OX) 400 MG tablet Take 1 tablet (400 mg) by  mouth daily 10/22/2024 at  8:21 AM    melatonin 10 MG TABS tablet Take 1 tablet (10 mg) by mouth every evening. 10/22/2024 at  9:35 PM    methocarbamol (ROBAXIN) 750 MG tablet Take 1 tablet (750 mg) by mouth 3 times daily as needed for muscle spasms. (Patient taking differently: Take 750 mg by mouth 3 times daily.) 10/22/2024 at  7:09 PM    multivitamin w/minerals (THERA-VIT-M) tablet Take 1 tablet by mouth daily. 10/22/2024 at  8:22 AM    pantoprazole (PROTONIX) 40 MG EC tablet Take 40 mg by mouth every morning (before breakfast). 10/23/2024 at  6:02 AM    warfarin ANTICOAGULANT (COUMADIN) 2.5 MG tablet Take 1 tablet (2.5 mg) by mouth daily. 10/22/2024 at  7:09 PM

## 2024-10-24 NOTE — PROGRESS NOTES
10/24/24 6190   Appointment Info   Signing Clinician's Name / Credentials (PT) Ignacio Saucedo DPT   Rehab Comments (PT) sternal prec   Living Environment   People in Home spouse   Current Living Arrangements house   Home Accessibility stairs to enter home   Number of Stairs, Main Entrance 3   Stair Railings, Main Entrance railings on both sides of stairs   Number of Stairs, Within Home, Primary greater than 10 stairs;other (see comments)   Stair Railings, Within Home, Primary other (see comments)  (has stair lift)   Transportation Anticipated family or friend will provide   Living Environment Comments Pt lives at home with his wife. He has stairs to enter the home and a stair lift within the home. His bedroom is upstairs and his kitchen and living room are on the main floor. He reports he has a walk-in shower with a built-in shower chair and grab bars. He reports using a walker with a seat. States he has to use a step stool to get into his high bed   Self-Care   Usual Activity Tolerance good   Current Activity Tolerance moderate   Regular Exercise No   Equipment Currently Used at Home walker, rolling;shower chair;grab bar, tub/shower   Fall history within last six months no   Activity/Exercise/Self-Care Comment Pt previously IND with all upright mobility, recently using FWW/4WW for mobility at U   General Information   Onset of Illness/Injury or Date of Surgery 10/23/24   Referring Physician Sirisha Arias, NADEGE CNP   Patient/Family Therapy Goals Statement (PT) To go home   Pertinent History of Current Problem (include personal factors and/or comorbidities that impact the POC) Duane C Johnson is a 74 year old male pmhx of anterior STEMI s/p PCI to the pLAD on 10/26/23 with a VT/VF arrest the next day (10/27) with patent stents and unchanged anatomy on repeat angiogram. Placed on amiodarone and discharged 11/03/23, ICD was not indicated as this was within 48h of his MI. His EF prior to discharge was 20-30% with  a large area of akinesis in the LAD, placed on apixaban due to this (Apixaban dcd on 7/5/24). Has had multiple admissions for HF exacerbation last year.  Admitted on 8/30/24 for CHF exacerbation with BNP 16k. CPX and RHC showed significant functional limitations due to heart failue. Underwent HM3 LVAD on 9/18/24.   Existing Precautions/Restrictions fall;cardiac   Cognition   Affect/Mental Status (Cognition) WFL   Orientation Status (Cognition) oriented x 4   Follows Commands (Cognition) WFL   Cognitive Status Comments Intermittent confusion present throughout session, pt covers cognition deficits well   Integumentary/Edema   Integumentary/Edema no deficits were identifed   Posture    Posture Forward head position;Protracted shoulders   Range of Motion (ROM)   ROM Comment BLE WFL   Strength (Manual Muscle Testing)   Strength Comments Mild generalized weakness 2/2 deconditioning   Bed Mobility   Bed Mobility supine-sit   Comment, (Bed Mobility) SBA with increased time   Transfers   Comment, (Transfers) STS with SBA   Gait/Stairs (Locomotion)   Comment, (Gait/Stairs) Amb x 20' with 4ww and SBA   Balance   Balance other (describe)   Sitting Balance: Static normal balance   Sitting Balance: Dynamic good balance   Standing Balance: Static good balance   Standing Balance: Dynamic fair balance   Balance Quick Add Sitting balance: Static;Sitting balance: dynamic;Standing balance: static;Standing balance: dynamic   Sensory Examination   Sensory Perception patient reports no sensory changes   Coordination   Coordination no deficits were identified   Muscle Tone   Muscle Tone no deficits were identified   Clinical Impression   Criteria for Skilled Therapeutic Intervention Yes, treatment indicated   PT Diagnosis (PT) Impaired functional mobility   Influenced by the following impairments Generalized weakness, impaired balance, reduced activity tolerance   Functional limitations due to impairments Decreased IND with bed mobility,  transfers, gait, stairs   Clinical Presentation (PT Evaluation Complexity) evolving   Clinical Presentation Rationale Clinical judgment, [facility]   Clinical Decision Making (Complexity) moderate complexity   Planned Therapy Interventions (PT) balance training;bed mobility training;gait training;neuromuscular re-education;patient/family education;stair training;strengthening;transfer training;home exercise program   Risk & Benefits of therapy have been explained evaluation/treatment results reviewed;care plan/treatment goals reviewed;risks/benefits reviewed;current/potential barriers reviewed;participants voiced agreement with care plan;participants included;patient   PT Total Evaluation Time   PT Eval, Moderate Complexity Minutes (93129) 8   Physical Therapy Goals   PT Frequency 3x/week   PT Predicted Duration/Target Date for Goal Attainment 11/22/24   PT Goals Bed Mobility;Transfers;Gait;Stairs   PT: Bed Mobility Independent;Supine to/from sit;Within precautions   PT: Transfers Independent;Sit to/from stand;Bed to/from chair;Assistive device;Within precautions   PT: Gait Modified independent;50 feet;Rolling walker   PT: Stairs 3 stairs;Modified independent;Rail on both sides;Within precautions   PT Discharge Planning   PT Plan Progress IND with gait, stairs, flat bed mobility, proximal strengthening   PT Discharge Recommendation (DC Rec) home with assist;home with home care physical therapy   PT Rationale for DC Rec Pt making good progress towards functional independence, would benefit from 4ww at home. Some concern for cognition, see OT with regards to cognition and safety. He would benefit from HH vs OP PT for proximal strengthening and stability.   PT Brief overview of current status Amb 3-4x/day with FWW   Physical Therapy Time and Intention   Timed Code Treatment Minutes 25   Total Session Time (sum of timed and untimed services) 33

## 2024-10-24 NOTE — PROGRESS NOTES
"   10/24/24 1058   Appointment Info   Signing Clinician's Name / Credentials (OT) KUSHAL Piña   Student Supervision Direct supervision provided   Rehab Comments (OT) LVAD   Living Environment   People in Home spouse   Current Living Arrangements house   Home Accessibility stairs to enter home   Number of Stairs, Main Entrance 3   Stair Railings, Main Entrance railings on both sides of stairs   Number of Stairs, Within Home, Primary greater than 10 stairs;other (see comments)   Stair Railings, Within Home, Primary other (see comments)  (stair lift)   Transportation Anticipated family or friend will provide   Living Environment Comments Pt lives at home with his wife. He has stairs to enter the home and a stair lift within the home. His bedroom is upstairs and his kitchen and living room are on the main floor. He reports he has a walk-in shower with a built-in shower chair and grab bars. He reports using a walker with a seat.   Self-Care   Usual Activity Tolerance good   Current Activity Tolerance fair   Regular Exercise No   Equipment Currently Used at Home walker, rolling;shower chair;grab bar, tub/shower   Fall history within last six months no   Activity/Exercise/Self-Care Comment Pt reports previously IND with ADLs   Instrumental Activities of Daily Living (IADL)   Previous Responsibilities meal prep;yardwork   IADL [place], he does the primary cooking and yardwork. , wife assists with laundry, shopping, med management and finances.   General Information   Onset of Illness/Injury or Date of Surgery 10/23/24   Referring Physician Sirisha Arias, APRN CNP   Patient/Family Therapy Goal Statement (OT) Return home   Additional Occupational Profile Info/Pertinent History of Current Problem Per chart: \"Duane C Johnson is a 74 year old male who presents from LVAD clinic with hypotension, dizziness, and new right arm weakness. Doppler MAP in clinic ranging 45-48.\"   Existing " Precautions/Restrictions fall   Limitations/Impairments safety/cognitive   General Observations and Info activity up with assist   Cognitive Status Examination   Orientation Status orientation to person, place and time   Cognitive Status Comments Pt appears grossly cognitvely intact w/ concerns of intermitent bouts of nonsensical storytelling and difficulty problem-solving through steps involved with LVAD mgmt despite verbal cues.   Visual Perception   Visual Impairment/Limitations corrective lenses for reading   Impact of Vision Impairment on Function (Vision) Reports shadow in upper right eye, reports it passes quickly and does not impact daily activities   Sensory   Sensory Quick Adds not tested   Sensory Comments Reports no changes in sensation   Pain Assessment   Patient Currently in Pain No   Posture   Posture forward head position;protracted shoulders   Range of Motion Comprehensive   General Range of Motion no range of motion deficits identified   Comment, General Range of Motion not formally assessed,appears WFL   Strength Comprehensive (MMT)   General Manual Muscle Testing (MMT) Assessment no strength deficits identified   Comment, General Manual Muscle Testing (MMT) Assessment not formally assessed,appears WFL   Coordination   Upper Extremity Coordination No deficits were identified   Coordination Comments not formally assessed,appears WFL   Bed Mobility   Bed Mobility No deficits identified   Comment (Bed Mobility) SBA   Transfers   Transfers sit-stand transfer   Transfer Comments SBA   Sit-Stand Transfer   Sit-Stand [place] (Transfers) contact guard   Balance   Balance Assessment no deficits identified   Balance Comments FWW and SBA   Activities of Daily Living   BADL Assessment/Intervention upper body dressing;lower body dressing;toileting   Bathing Assessment/Intervention   [place] Level (Bathing) minimum assist (75% patient effort);moderate assist (50% patient effort)   Comment, (Bathing)  per clinical judgement   Upper Body Dressing Assessment/Training   Comment, (Upper Body Dressing) per clinical judgement   [place] Level (Upper Body Dressing) minimum assist (75% patient effort);set up   Lower Body Dressing Assessment/Training   Comment, (Lower Body Dressing) per clinical judgement   [place] Level (Lower Body Dressing) supervision   Grooming Assessment/Training   [place] Level (Grooming) supervision   Comment, (Grooming) per clinical judgement   Toileting   [place] Level (Toileting) supervision   Comment, (Toileting) per clinical judgement   Clinical Impression   Criteria for Skilled Therapeutic Interventions Met (OT) Yes, treatment indicated   OT Diagnosis Decreased IND with ADL due to LVAD mgmt and generalized deconditioning   OT Problem List-Impairments impacting ADL problems related to;activity tolerance impaired;cognition;strength   ADL comments/analysis Pt requiring close SBA-CGA for safety with ADL/IADLs   Assessment of Occupational Performance 3-5 Performance Deficits   Identified Performance Deficits toileting, dressing, functional endurance, bathing,   Planned Therapy Interventions (OT) ADL retraining;IADL retraining;progressive activity/exercise;strengthening   Clinical Decision Making Complexity (OT) problem focused assessment/low complexity   Risk & Benefits of therapy have been explained evaluation/treatment results reviewed;care plan/treatment goals reviewed;risks/benefits reviewed;current/potential barriers reviewed;participants voiced agreement with care plan;participants included;patient   Clinical Impression Comments Pt functioning below baseline, would benefit from continued skilled IP OT to progress safety and IND with ADLs/IADLs   OT Total Evaluation Time   OT Eval, Low Complexity Minutes (29131) 10   OT Goals   Therapy Frequency (OT) 5 times/week   OT Predicted Duration/Target Date for Goal Attainment 10/31/24   OT Goals Upper Body Dressing;Lower Body  Dressing;Toilet Transfer/Toileting;Home Management;Hygiene/Grooming;OT Goal 1   OT: Hygiene/Grooming supervision/stand-by assist   OT: Upper Body Dressing Supervision/stand-by assist   OT: Lower Body Dressing Supervision/stand-by assist   OT: Toilet Transfer/Toileting Supervision/stand-by assist;using adaptive equipment   OT: Goal 1 Pt will demonstrate ability to manage LVAD with supervision to support safety.   Self-Care/Home Management   Self-Care/Home Mgmt/ADL, Compensatory, Meal Prep Minutes (99140) 10   Symptoms Noted During/After Treatment (Meal Preparation/Planning Training) none   Treatment Detail/Skilled Intervention Pt greeted supine in bed upon arrival, agreeable to session. Facilitated enagement in ADLs to progress IND. Pt switching from wall powered <> battery power for LVAD, pt requiring VC to connect to correct cord, unable to follow-through, required Maria A to connnect safely and accuratly. Provided VC for donning LVAD vest, unable to properly don, attempting multiple times to put on backwards, required Max A to manage lines for battery pack.   Therapeutic Activities   Therapeutic Activity Minutes (47767) 15   Symptoms noted during/after treatment fatigue   Treatment Detail/Skilled Intervention Session focused on functional ambulation to progress activity tolerance needed to manage household distances. Facilitated STS, required CGA . Tolerated ~300 ft of ambulation with FWW, CGA, and w/c follow. Pt verbalizing need for rest break. Monitored LVAD, PI decreasing to 2.4 with ambulation, increased with rest to within parameters. Pt ambulated ~ 75 ft back to room, retrurned to bed with call light in reach and all immeditate needs met.   OT Discharge Planning   OT Plan FBD, shower tx, toilet tx, standing ADLs, LVAD mgmt, functional ambulation   OT Discharge Recommendation (DC Rec) home with assist;home with home care occupational therapy   OT Rationale for DC Rec Pt functioning below baseline primarily due  to generalized deconditioning. Pt has support from wife and his home is equipped with stair lift, shower chair and grab bars. Currently recommending d/c home with assist from wife for heavy IADLs once medically cleared as well as assist for managing LVAD.   OT Brief overview of current status CGA and FWW   Total Session Time   Timed Code Treatment Minutes 25   Total Session Time (sum of timed and untimed services) 35

## 2024-10-24 NOTE — PROGRESS NOTES
VAD Social Work Services Progress Note    Date of Initial Social Work Evaluation: 09/05/2024  Collaborated with: Patient, Care Coordinator    Data: Duane received his LVAD on 9/18/24 and transitioned to  ARU and then to TCU. Pt presented to [facility] LVAD clinic with hypotension, dizziness, and new right arm weakness. Stroke code was called to assess symptoms further.  Duane sitting upright in bed and indicated just finishing with therapies. Team did not find a stroke and are working on stabilizing medications. Patient to get a right heart cath today.    Intervention: SW met with Pt for supportive visit at the bedside. SW collaborated with Care Coordinator, who completed Care Management Assessment, regarding recommendation for home with home therapies. Reviewed 24/7-30 day caregiver support plan. Inquired into questions or concerns. Assessed coping and discussed discharge planning.     Assessment: Patient was laying in bed and open to meeting with social work. Patient presented upbeat and interactive in conversation. He discussed just completing therapy for today and that it felt helpful. He inquired into how he would  his items from  TCU, as well as discussed readiness to return home. He shared his wife had graduated from VAD education. Duane denied further questions at this time. Indicated coping appropriately.  ADDENDUM 10/25: SW called wife to discuss picking up items at TCU. Wife to  Pt's  from TCU. Denied additional questions at this time and aware of discharge planning.  Education provided by SW: Ongoing SW availability, discharge planning  Plan:    Discharge Plans in Progress: Home with outpatient therapies    Barriers to d/c plan: Medical readiness    Follow up Plan: SW to continue to follow to support post-VAD psychosocial concerns. RNCC to set up outpatient therapy services.    Jessi Almendarez, MSW, LGSW, APSW  [facility]   [facility]  Ph.  [phone]

## 2024-10-24 NOTE — PROGRESS NOTES
Spoke with bedside RN who expressed concern about additional education for his LVAD. RN notes that patient needs additional teaching regarding how to switch between power sources Encouraged bedside RN to walk pt through this step, and allow him to do this task each shift with the supervision of the bedside RN as practice. Toward date of discharge, a VAD coordinator will plan to provide some refresher teaching before heading home.

## 2024-10-24 NOTE — PROGRESS NOTES
Admission: 10/23/24   Diagnosis: Referred from LVAD clinic with hypotension, dizziness, new right arm weakness. Doppler MAP in clinic ranging 45-48.   Admitted from: [facility]   Via: stretcher   Accompanied by: no one at this time, pt's wife at home  Belongings: Placed in closet; valuables sent home with family   Admission paperwork: complete   Teaching: Call don't fall, use of console, meal times, visiting hours, orientation to unit, when to call for the RN (angina/sob/dizzyness, etc.), and the importance of safety.   Access: R PIV   Telemetry:Placed on pt   Ht./Wt.: complete   Two nurse head-to-toe skin assessment performed by writer and Hannah RAMIREZ  Skin issues noted: abrasion to right shin, midsternal incision and old CT sites, LVAD driveline site, blanchable redness to perineum, mepilex in place.  See PCS for assessment and treatment of wounds and surgical sites.

## 2024-10-24 NOTE — PHARMACY-CONSULT NOTE
Pharmacy Consult to evaluate for medication related stroke core measures    Duane C Johnson, 74 year old male admitted for stroke-like symptoms including acute onset dizziness, lightheadedness and transient left UE weakness on 10/23/2024.    Thrombolytic was not given because of Clinical contraindications, Laboratory contraindications (INR >1.7 on warfarin and low NIHSS score of 1)    VTE Prophylaxis  on warfarin with therapeutic INR on presentation    Antithrombotic: warfarin continued from outpatient on 10/23/24, as appropriate by end of hospital day 2. Plan to continue antithrombotic therapy on discharge to meet quality measures, unless contraindicated.    Anticoagulation if history of A-fib/flutter: Patient on warfarin; continue anticoagulation on discharge to meet quality measures, unless contraindicated.    LDL Cholesterol Calculated   Date Value Ref Range Status   09/04/2024 45 <=100 mg/dL Final   04/22/2019 84 <100 mg/dL Final     Comment:     Desirable:       <100 mg/dl       Patient's home statin, Lipitor (atorvastatin) restarted; continue statin on discharge to meet quality measures, unless contraindicated.     Recommendations: None at this time    Thank you for the consult.    Moises Danielson RPH 10/24/2024 10:04 AM

## 2024-10-25 ENCOUNTER — APPOINTMENT (OUTPATIENT)
Dept: OCCUPATIONAL THERAPY | Facility: CLINIC | Age: 74
DRG: 286 | End: 2024-10-25
Payer: MEDICARE

## 2024-10-25 LAB
ALBUMIN SERPL BCG-MCNC: 2.9 G/DL (ref 3.5–5.2)
ALBUMIN SERPL BCG-MCNC: 3 G/DL (ref 3.5–5.2)
ALP SERPL-CCNC: 104 U/L (ref 40–150)
ALP SERPL-CCNC: 110 U/L (ref 40–150)
ALT SERPL W P-5'-P-CCNC: 37 U/L (ref 0–70)
ALT SERPL W P-5'-P-CCNC: 43 U/L (ref 0–70)
ANION GAP SERPL CALCULATED.3IONS-SCNC: 7 MMOL/L (ref 7–15)
ANION GAP SERPL CALCULATED.3IONS-SCNC: 9 MMOL/L (ref 7–15)
AST SERPL W P-5'-P-CCNC: 38 U/L (ref 0–45)
AST SERPL W P-5'-P-CCNC: 41 U/L (ref 0–45)
BASOPHILS # BLD AUTO: 0.1 10E3/UL (ref 0–0.2)
BASOPHILS NFR BLD AUTO: 1 %
BILIRUB SERPL-MCNC: 0.3 MG/DL
BILIRUB SERPL-MCNC: 0.4 MG/DL
BUN SERPL-MCNC: 18.9 MG/DL (ref 8–23)
BUN SERPL-MCNC: 20.1 MG/DL (ref 8–23)
CALCIUM SERPL-MCNC: 8.4 MG/DL (ref 8.8–10.4)
CALCIUM SERPL-MCNC: 8.5 MG/DL (ref 8.8–10.4)
CHLORIDE SERPL-SCNC: 96 MMOL/L (ref 98–107)
CHLORIDE SERPL-SCNC: 97 MMOL/L (ref 98–107)
CREAT SERPL-MCNC: 0.62 MG/DL (ref 0.67–1.17)
CREAT SERPL-MCNC: 0.73 MG/DL (ref 0.67–1.17)
EGFRCR SERPLBLD CKD-EPI 2021: >90 ML/MIN/1.73M2
EGFRCR SERPLBLD CKD-EPI 2021: >90 ML/MIN/1.73M2
EOSINOPHIL # BLD AUTO: 0.1 10E3/UL (ref 0–0.7)
EOSINOPHIL NFR BLD AUTO: 1 %
ERYTHROCYTE [DISTWIDTH] IN BLOOD BY AUTOMATED COUNT: 16.8 % (ref 10–15)
GLUCOSE SERPL-MCNC: 113 MG/DL (ref 70–99)
GLUCOSE SERPL-MCNC: 134 MG/DL (ref 70–99)
HCO3 SERPL-SCNC: 24 MMOL/L (ref 22–29)
HCO3 SERPL-SCNC: 24 MMOL/L (ref 22–29)
HCT VFR BLD AUTO: 31.3 % (ref 40–53)
HGB BLD-MCNC: 10.1 G/DL (ref 13.3–17.7)
IMM GRANULOCYTES # BLD: 0 10E3/UL
IMM GRANULOCYTES NFR BLD: 1 %
INR PPP: 2.29 (ref 0.85–1.15)
LDH SERPL L TO P-CCNC: 237 U/L (ref 0–250)
LDH SERPL L TO P-CCNC: 252 U/L (ref 0–250)
LYMPHOCYTES # BLD AUTO: 0.4 10E3/UL (ref 0.8–5.3)
LYMPHOCYTES NFR BLD AUTO: 6 %
MCH RBC QN AUTO: 31 PG (ref 26.5–33)
MCHC RBC AUTO-ENTMCNC: 32.3 G/DL (ref 31.5–36.5)
MCV RBC AUTO: 96 FL (ref 78–100)
MONOCYTES # BLD AUTO: 0.5 10E3/UL (ref 0–1.3)
MONOCYTES NFR BLD AUTO: 9 %
NEUTROPHILS # BLD AUTO: 4.9 10E3/UL (ref 1.6–8.3)
NEUTROPHILS NFR BLD AUTO: 83 %
NRBC # BLD AUTO: 0 10E3/UL
NRBC BLD AUTO-RTO: 0 /100
PLATELET # BLD AUTO: 185 10E3/UL (ref 150–450)
POTASSIUM SERPL-SCNC: 4.2 MMOL/L (ref 3.4–5.3)
POTASSIUM SERPL-SCNC: 4.8 MMOL/L (ref 3.4–5.3)
PROT SERPL-MCNC: 6 G/DL (ref 6.4–8.3)
PROT SERPL-MCNC: 6 G/DL (ref 6.4–8.3)
RBC # BLD AUTO: 3.26 10E6/UL (ref 4.4–5.9)
SODIUM SERPL-SCNC: 127 MMOL/L (ref 135–145)
SODIUM SERPL-SCNC: 130 MMOL/L (ref 135–145)
WBC # BLD AUTO: 6 10E3/UL (ref 4–11)

## 2024-10-25 PROCEDURE — 99418 PROLNG IP/OBS E/M EA 15 MIN: CPT | Performed by: STUDENT IN AN ORGANIZED HEALTH CARE EDUCATION/TRAINING PROGRAM

## 2024-10-25 PROCEDURE — 250N000013 HC RX MED GY IP 250 OP 250 PS 637: Performed by: STUDENT IN AN ORGANIZED HEALTH CARE EDUCATION/TRAINING PROGRAM

## 2024-10-25 PROCEDURE — 80053 COMPREHEN METABOLIC PANEL: CPT | Performed by: PHYSICIAN ASSISTANT

## 2024-10-25 PROCEDURE — 258N000003 HC RX IP 258 OP 636: Performed by: INTERNAL MEDICINE

## 2024-10-25 PROCEDURE — 99232 SBSQ HOSP IP/OBS MODERATE 35: CPT | Mod: 25 | Performed by: PHYSICIAN ASSISTANT

## 2024-10-25 PROCEDURE — 97535 SELF CARE MNGMENT TRAINING: CPT | Mod: GO

## 2024-10-25 PROCEDURE — 120N000003 HC R&B IMCU UMMC

## 2024-10-25 PROCEDURE — 82247 BILIRUBIN TOTAL: CPT | Performed by: PHYSICIAN ASSISTANT

## 2024-10-25 PROCEDURE — 36415 COLL VENOUS BLD VENIPUNCTURE: CPT | Performed by: NURSE PRACTITIONER

## 2024-10-25 PROCEDURE — 250N000013 HC RX MED GY IP 250 OP 250 PS 637

## 2024-10-25 PROCEDURE — 250N000013 HC RX MED GY IP 250 OP 250 PS 637: Performed by: PHYSICIAN ASSISTANT

## 2024-10-25 PROCEDURE — 250N000011 HC RX IP 250 OP 636: Performed by: INTERNAL MEDICINE

## 2024-10-25 PROCEDURE — 250N000013 HC RX MED GY IP 250 OP 250 PS 637: Performed by: NURSE PRACTITIONER

## 2024-10-25 PROCEDURE — 82040 ASSAY OF SERUM ALBUMIN: CPT | Performed by: PHYSICIAN ASSISTANT

## 2024-10-25 PROCEDURE — 85025 COMPLETE CBC W/AUTO DIFF WBC: CPT | Performed by: PHYSICIAN ASSISTANT

## 2024-10-25 PROCEDURE — 93750 INTERROGATION VAD IN PERSON: CPT | Performed by: PHYSICIAN ASSISTANT

## 2024-10-25 PROCEDURE — 36415 COLL VENOUS BLD VENIPUNCTURE: CPT | Performed by: PHYSICIAN ASSISTANT

## 2024-10-25 PROCEDURE — 83615 LACTATE (LD) (LDH) ENZYME: CPT | Performed by: PHYSICIAN ASSISTANT

## 2024-10-25 PROCEDURE — 85610 PROTHROMBIN TIME: CPT | Performed by: NURSE PRACTITIONER

## 2024-10-25 PROCEDURE — 97110 THERAPEUTIC EXERCISES: CPT | Mod: GO

## 2024-10-25 RX ORDER — VANCOMYCIN HYDROCHLORIDE 1 G/200ML
1000 INJECTION, SOLUTION INTRAVENOUS EVERY 12 HOURS
Status: DISCONTINUED | OUTPATIENT
Start: 2024-10-25 | End: 2024-10-25

## 2024-10-25 RX ORDER — WARFARIN SODIUM 2.5 MG/1
2.5 TABLET ORAL
Status: COMPLETED | OUTPATIENT
Start: 2024-10-25 | End: 2024-10-25

## 2024-10-25 RX ORDER — HYDRALAZINE HYDROCHLORIDE 50 MG/1
50 TABLET, FILM COATED ORAL 3 TIMES DAILY
Status: DISCONTINUED | OUTPATIENT
Start: 2024-10-25 | End: 2024-10-25

## 2024-10-25 RX ADMIN — HYDRALAZINE HYDROCHLORIDE 50 MG: 50 TABLET ORAL at 13:22

## 2024-10-25 RX ADMIN — HYDRALAZINE HYDROCHLORIDE 50 MG: 50 TABLET ORAL at 04:38

## 2024-10-25 RX ADMIN — GABAPENTIN 100 MG: 100 CAPSULE ORAL at 21:31

## 2024-10-25 RX ADMIN — AMIODARONE HYDROCHLORIDE 200 MG: 200 TABLET ORAL at 08:31

## 2024-10-25 RX ADMIN — WARFARIN SODIUM 2.5 MG: 2.5 TABLET ORAL at 17:48

## 2024-10-25 RX ADMIN — FLUDROCORTISONE ACETATE 0.1 MG: 0.1 TABLET ORAL at 08:31

## 2024-10-25 RX ADMIN — ESCITALOPRAM OXALATE 10 MG: 10 TABLET ORAL at 08:31

## 2024-10-25 RX ADMIN — ATORVASTATIN CALCIUM 80 MG: 80 TABLET, FILM COATED ORAL at 20:08

## 2024-10-25 RX ADMIN — Medication 62.5 MG: at 21:31

## 2024-10-25 RX ADMIN — MAGNESIUM OXIDE TAB 400 MG (241.3 MG ELEMENTAL MG) 400 MG: 400 (241.3 MG) TAB at 08:31

## 2024-10-25 RX ADMIN — VANCOMYCIN HYDROCHLORIDE 1500 MG: 10 INJECTION, POWDER, LYOPHILIZED, FOR SOLUTION INTRAVENOUS at 04:53

## 2024-10-25 RX ADMIN — LOSARTAN POTASSIUM 50 MG: 50 TABLET, FILM COATED ORAL at 20:08

## 2024-10-25 RX ADMIN — Medication 1 TABLET: at 08:31

## 2024-10-25 RX ADMIN — SPIRONOLACTONE 12.5 MG: 25 TABLET, FILM COATED ORAL at 08:32

## 2024-10-25 RX ADMIN — LOSARTAN POTASSIUM 50 MG: 50 TABLET, FILM COATED ORAL at 08:31

## 2024-10-25 ASSESSMENT — ACTIVITIES OF DAILY LIVING (ADL)
ADLS_ACUITY_SCORE: 0
ADLS_ACUITY_SCORE: 20.25
ADLS_ACUITY_SCORE: 0

## 2024-10-25 NOTE — PLAN OF CARE
"Shift Note: [id number]    Neuro: A&O x4, denied pain and dizziness.  Respiratory:  room air; SpO2 >94.  Cardiac: Afib w/BBB on telemetry; rates 80's. HM3 LVAD running at a fixed rate of 5100 rpm, multiple low flow alarms during shift; 2.1-2.4. Patient asymptomatic. MD (tigist Navas) notified. PI 3-10's. Drive line site clean/dry/intact. Maps ; scheduled Hydralazine provided.     GI: Denied nausea, bowel sounds present. No bm overnight.   : adequate urine output; see flowsheet for output   Skin: No new deficits noted during shift; generalized bruising noted and right internal jugular site dressing (C/D/I).   Mobility: stand by assist with rolling walker    Plan:  Monitor flows, encourage PO intake. Continue IV antibiotics. Continue with plan of care and notify primary team of any changes.     Shift Events:  - 0429: MD notified about increasing PI's (8.0-10.1) and continuous low flow alarms (2.1-2.4). Map of 110. MD ordered 50mg Hydralazine; given. Map recheck; 100.     Goal Outcome Evaluation:      Plan of Care Reviewed With: patient    Overall Patient Progress: no change    Overall Patient Progress: no change    Outcome Evaluation: VSS, LVAD numbers WDL      Problem: Adult Inpatient Plan of Care  Goal: Plan of Care Review  Description: The Plan of Care Review/Shift note should be completed every shift.  The Outcome Evaluation is a brief statement about your assessment that the patient is improving, declining, or no change.  This information will be displayed automatically on your shift  note.  Outcome: Progressing  Flowsheets (Taken 10/25/2024 0158)  Outcome Evaluation: VSS, LVAD numbers WDL  Plan of Care Reviewed With: patient  Overall Patient Progress: no change  Goal: Patient-Specific Goal (Individualized)  Description: You can add care plan individualizations to a care plan. Examples of Individualization might be:  \"Parent requests to be called daily at 9am for status\", \"I have a hard time hearing out of " "my right ear\", or \"Do not touch me to wake me up as it startles  me\".  Outcome: Progressing  Goal: Absence of Hospital-Acquired Illness or Injury  Outcome: Progressing  Intervention: Identify and Manage Fall Risk  Recent Flowsheet Documentation  Taken 10/24/2024 2348 by Lolis Armendariz RN  Safety Promotion/Fall Prevention:   activity supervised   clutter free environment maintained   increased rounding and observation   lighting adjusted   mobility aid in reach  Intervention: Prevent Skin Injury  Recent Flowsheet Documentation  Taken 10/24/2024 2348 by Lolis Armendariz RN  Body Position: position changed independently  Intervention: Prevent Infection  Recent Flowsheet Documentation  Taken 10/24/2024 2348 by Lolis Armendariz RN  Infection Prevention:   single patient room provided   hand hygiene promoted  Goal: Optimal Comfort and Wellbeing  Outcome: Progressing  Goal: Readiness for Transition of Care  Outcome: Progressing     Problem: Ventricular Assist Device  Goal: Optimal Adjustment to Device  Outcome: Progressing  Intervention: Optimize Psychosocial Response to VAD  Recent Flowsheet Documentation  Taken 10/25/2024 0158 by Lolis Armendariz RN  Supportive Measures:   active listening utilized   decision-making supported   positive reinforcement provided   self-care encouraged  Goal: Absence of Bleeding  Outcome: Progressing  Intervention: Monitor and Manage Bleeding  Flowsheets (Taken 10/25/2024 0158)  Bleeding Precautions: blood pressure closely monitored  Bleeding Management: dressing monitored  Goal: Optimal Device Function  Outcome: Progressing  Goal: Absence of Embolism Signs and Symptoms  Outcome: Progressing  Goal: Optimal Functional Ability  Outcome: Progressing  Intervention: Optimize Functional Ability  Recent Flowsheet Documentation  Taken 10/24/2024 2348 by Lolis Armendariz RN  Activity Management: activity adjusted per tolerance  Goal: Optimal Blood Flow  Outcome: Progressing  Goal: " Absence of Infection Signs and Symptoms  Outcome: Progressing  Intervention: Prevent or Manage Infection  Recent Flowsheet Documentation  Taken 10/24/2024 2348 by Lolis Armendariz RN  Infection Prevention:   single patient room provided   hand hygiene promoted     Problem: Delirium  Goal: Optimal Coping  Outcome: Progressing  Intervention: Optimize Psychosocial Adjustment to Delirium  Flowsheets (Taken 10/25/2024 0158)  Supportive Measures:   active listening utilized   decision-making supported   positive reinforcement provided   self-care encouraged  Goal: Improved Behavioral Control  Outcome: Progressing  Intervention: Minimize Safety Risk  Recent Flowsheet Documentation  Taken 10/24/2024 2348 by Lolis Armendariz RN  Enhanced Safety Measures:   pain management   room near unit station  Goal: Improved Attention and Thought Clarity  Outcome: Progressing  Goal: Improved Sleep  Outcome: Progressing     Problem: Heart Failure  Goal: Optimal Coping  Outcome: Progressing  Intervention: Support Psychosocial Response  Flowsheets (Taken 10/25/2024 0158)  Supportive Measures:   active listening utilized   decision-making supported   positive reinforcement provided   self-care encouraged  Goal: Optimal Cardiac Output  Outcome: Progressing  Goal: Stable Heart Rate and Rhythm  Outcome: Progressing  Goal: Fluid and Electrolyte Balance  Outcome: Progressing  Goal: Optimal Functional Ability  Outcome: Progressing  Intervention: Optimize Functional Ability  Recent Flowsheet Documentation  Taken 10/24/2024 2348 by Lolis Armendariz RN  Activity Management: activity adjusted per tolerance  Goal: Improved Oral Intake  Outcome: Progressing  Goal: Effective Oxygenation and Ventilation  Outcome: Progressing  Intervention: Promote Airway Secretion Clearance  Recent Flowsheet Documentation  Taken 10/24/2024 2348 by Lolis Armendariz RN  Cough And Deep Breathing: done independently per patient  Activity Management: activity  adjusted per tolerance  Intervention: Optimize Oxygenation and Ventilation  Recent Flowsheet Documentation  Taken 10/24/2024 2348 by Lolis Armendariz RN  Head of Bed (HOB) Positioning: HOB at 30-45 degrees  Goal: Effective Breathing Pattern During Sleep  Outcome: Progressing  Intervention: Monitor and Manage Obstructive Sleep Apnea  Recent Flowsheet Documentation  Taken 10/24/2024 2348 by Lolis Armendariz, RN  Medication Review/Management: medications reviewed

## 2024-10-25 NOTE — PLAN OF CARE
Neuro: A&Ox4.   Cardiac: Afib, LVAD HM3.   Respiratory: Sating 98 on RA.  GI/: Adequate urine output. No bm this shfit   Diet/appetite: Tolerating easy to chew, thin liquids diet. Eating well.  Activity:  Assist of 1 up to chair and in halls.  Pain: At acceptable level on current regimen.   Skin: No new deficits noted.  LDA's:  PIV   LVAD HM3 - dressing changed today    Plan: Continue with POC. Notify primary team with changes.

## 2024-10-25 NOTE — PROGRESS NOTES
D: Stopped by to see patient. No VAD related questions or concerns at this time.   I: Discussed POC and provided support and listened to patient's thoughts and concerns.  P: Continue to follow patient and address any questions or concerns patient may have.      Indication of Interrogation:  Heart failure, eval hemodynamic fxn, flows/PI    Type of VAD:  Heartmate 3    Current Parameters:  Flow= 3.4 lpm, Speed= 5100 rpm, Power= 3.2 jackson, PI (if applicable)= 4.5    Abnormal Alarm on History:  Yes, explain: Pt with low flow alarms x 6    Abnormal Events/Parameters Notes:  Yes, explain: Only low flow alarms    Changes Made during Interrogation:  No

## 2024-10-25 NOTE — PROGRESS NOTES
The patient's HeartMate LVAD was interrogated 10/25/2024  * Speed 5100 rpm   * Pulsatility index 3.9   * Power 3.4 Dahl   * Flow 3.4 L/minute   Fluid status: neaur euvolemic   Alarms were reviewed, and notable for many low flow alarms this morning between 5 am and 5:20am.   The driveline exit site was inspected, c/d/i.   All external components were inspected and showed no evidence of damage or malfunction, none replaced.   No changes to VAD settings made

## 2024-10-25 NOTE — PROGRESS NOTES
[facility]   Cardiology II Service / Advanced Heart Failure  Daily Progress Note      Patient: Duane C Johnson  MRN: [id number]  Admission Date: 10/23/2024  Hospital Day # 2    Assessment and Plan: Duane C Johnson is a 74 year old male who presents from LVAD clinic with hypotension, dizziness, and new right arm weakness. Doppler MAP in clinic ranging 45-48.      In reviewing the TCU chart, MAPS have been ranging from 60-84 with well controled HRs in the 50s-60s. For afterload his hydralazine was decreased yesterday from 100 mg q8h to 75 mg every 8 hours. He is still getting losartan 50 mg BID, although one dose was held yesterday. Last low flow alarm 10/19. He had several prolonged low flow alarms on 10/17 for which hydralazine was increased. CT-A was done on 10/17/24 with no outflow graft ostruction. The outflow graft does make an acute angle as it approaches the aorta.    Today's Plan:  - hydralazine increased to 62.5 mg TID (has hold parameters, had very severe hypotension at 75 mg TID)  - encouraged OOB and walking with staff     # Acute on chronic systolic heart failure secondary to ICM   # s/p HM3 LVAD as DT on 9/18/2024  # CAD s/p PCI  # History of STEMI  # s/p ICD 5/2024  # Acute Angle of Outflow Graft  Stage D. NYHA Class III.    Ra 6, PA 40/14/20, PCW 9, Goldy CO/CI 4.54/2.59    Fluid status: euvolemic   ACEi/ARB/ARNi:   losartan 50 BID  Vasodilator: increased hydralazine to 62.5 mg TID given ongoing hypertension. Had significant hypotension on 75 mg TID.  BB: Recent LVAD, will defer to outpatient  Aldosterone antagonist deferred while other medical therapy is prioritized  SGLT2i: hold Empagliflozin 10 mg   SCD prophylaxis: ICD  MAP: goal 65-85, elevated   LDH trends: 252 on 10/25, stable. Monitor weekly   Anticoagulation: warfarin dosing per pharmacy, INR goal 2-3, today 2.29  Antiplatelet: ASA not indicated in LVAD population, > 6 months s/p STEMI     # Low flow alarms  # Elevated PIs  #  Labile MAPs   # Suspected orthostatic hypotension   Speed optimization echo performed 10/1, speed decreased to 5100. Low flows seems releated to hypertension and some hypovolemia as well. Have improved with re-timing hydralazine and after IV fluids. CT-A was done on  10/17/24 with no outflow graft ostruction. The outflow graft does make an acute angle as it approaches the aorta, discussed with Dr. Valentine, unlikely to be impacting the low flows at this time.  Several low flow alarms 10/24 in the AM. MAP . Overnight hydralazine was held for  MAP of 70-unclear why med was held. Then more low flow alarms AM of 10/25 in the setting of elevated MAPS, resolved with his hydralazine given early and pushed po fluids.  - Losartan 50 mg BID  - Hydralazine 62.5 mg q8h  - Fludrocortisone 0.1 mg daily   - Increase PO intake as above     # History of VT/VF arrest 2023  # AFib s/p DCCV 9/2024 and again 9/30/2024  - Successfully cardioverted in early September for AF. Since then EKG suggested AF on 9/21. Tele is only available from as early as 9/22. The patient was started on hep gtt 9/21, but unclear if was in AF before this. While the patient was on hep gtt until INR was therapeutic, it is not possible to assess rhythm prior to 9/21. Systemic AC was off on 9/18-9/20. S/p MELBA and DCCV on 9/30 with conversion to sinus rhythm.  10/23 device interrogation shows persistent episode of AF since 10/10 with rates   - Continue amiodarone 200 mg daily   - Continue AC as above     # Visual changes, resolved.   # Generalized weakness  # Headache  Stoke code called 9/29 for visual changes - right eye with decreased upper half of vision and bluish haze. Resolved after 30 minutes. Seen by opthalmology and neuro. Negative head CT and CTA head and neck. He has had 3 episodes which last about 5 minutes before resolving completely.      On 10/9 Stroke code activated due to concern of generalized weakness, numbness and headache. LKW reported  "as 0715 when he worked with therapies and shortly after started feeling generally weak. Then around 1500 he had onset of moderate \"tension\" type headache and tingling in the bilateral fingertips. Per RRT URIAH patient now reports tingling has resolved and headache is improving. MAP 48, INR 2.2. Given absence of focal deficits and rapidly improving symptoms, opted to cancel stroke.  CT head negative, though did show chronic maxillary sinusitis.      Stroke code called 10/23 when seen in clinic with report of new RUE weakness. He was notably hypotensive at this time with MAP ~45 with new right pronator drift. Evaluated in ED, CT and CTA head and neck negative. RUE weakness resolved.   - Already on A/C  - Continue to monitor for changes     # Hypovolemia hyponatremia  Na 127, ranging 128-133.   - encourage PO intake  - daily BMP  - hold empagliflozin 10 mg daily     # Blood culture postivie for staph epi. 1/2 bottles. No fevers. No symptoms  - Got one dose of Ivvanco  - Will stop vanco and monitor for further symptoms  - Repeat blood culture pending from 10/25, continue to follow    FEN: Combination diet  PROPHY:  warfarin  LINES:  PIV  DISPO:  2-3 days  CODE STATUS:  Full Code      Time/Communication  I spent 45 minutes reviewing results, care team notes, meeting directly with the patient, coordinating care, and completing documentation on the day of service.     Patient discussed with Dr. Anthony Forrest PA-C  Advanced Heart Failure/Cardiology II Service  [facility] or Pager [phone]  ================================================================    Subjective/24-Hr Events:   Last 24 hr care team notes reviewed. Several low flows this morning that occurred with elevated MAPs (). Asymptomatic at time of low flows. Breathing comfortable. Denies lightheadedness/dizziness, but has not been out of bed since admission. Plans to get up walking later. No infectious symptoms. No pain. Denies HA, " "numbness, weakness. No stroke symptoms.  No s/s of bleeding.  N ofluid retention symptoms. No driveline concerns.    ROS:  4 point ROS including respiratory, CV, GI and  (other than that noted in the HPI) is negative.     Medications: Reviewed in EPIC.     Physical Exam:   BP (!) 80/65   Pulse 98   Temp 97.4  F (36.3  C) (Oral)   Resp 16   Ht 1.702 m (5' 7\")   Wt 63.1 kg (139 lb 1.6 oz)   SpO2 98%   BMI 21.79 kg/m      GENERAL: Appears comfortable, in no distress.  HEENT: Eye symmetrical, no discharge or icterus bilaterally.   NECK: Supple, JVD at clavicle at 90 degrees.   CV: tachycardic, irregular, +LVAD hum   RESPIRATORY: Respirations regular, even, and unlabored. Lungs CTA throughout.    GI: Soft and non distended with normoactive bowel sounds present in all quadrants. No tenderness, rebound, guarding.   EXTREMITIES: No peripheral edema. All extremities are warm and well perfused  NEUROLOGIC: Alert and interacting appropriatly. No focal deficits.   MUSCULOSKELETAL: No joint swelling or tenderness.   SKIN: No jaundice. No rashes or lesions.     Labs:  CMP  Recent Labs   Lab 10/25/24  1320 10/24/24  2130 10/24/24  2103 10/24/24  1723 10/24/24  0626 10/24/24  0534 10/23/24  2233 10/23/24  1137 10/23/24  0927 10/23/24  0926   * 130*  --   --   --  131*  --   --   --  129*  132*   POTASSIUM 4.2 4.8  --   --   --  4.2  --   --   --  3.8  4.5   CHLORIDE 96* 97*  --   --   --  99  --   --   --  94*   CO2 24 24  --   --   --  26  --   --   --  23   ANIONGAP 7 9  --   --   --  6*  --   --   --  12   * 113* 120* 92   < > 91   < >  --   --  97  108*   BUN 20.1 18.9  --   --   --  23.9*  --   --   --  24.4*   CR 0.62* 0.73  --   --   --  0.68  --   --  0.6* 0.77   GFRESTIMATED >90 >90  --   --   --  >90  --   --  >60 >90   PRANAV 8.4* 8.5*  --   --   --  8.5*  --   --   --  8.6*   MAG  --   --   --   --   --   --   --  1.8  --  1.9   PROTTOTAL 6.0* 6.0*  --   --   --   --   --   --   --   --    ALBUMIN " 2.9* 3.0*  --   --   --   --   --   --   --   --    BILITOTAL 0.4 0.3  --   --   --   --   --   --   --   --    ALKPHOS 110 104  --   --   --   --   --   --   --   --    AST 38 41  --   --   --   --   --   --   --   --    ALT 37 43  --   --   --   --   --   --   --   --     < > = values in this interval not displayed.       CBC  Recent Labs   Lab 10/25/24  1320 10/24/24  2130 10/23/24  0926 10/21/24  0548   WBC 6.0 5.8 8.2 7.2   RBC 3.26* 3.22* 3.70* 3.38*   HGB 10.1* 9.9* 11.4*  11.6* 10.8*   HCT 31.3* 31.3* 35.5*  34* 32.0*   MCV 96 97 96 95   MCH 31.0 30.7 30.8 32.0   MCHC 32.3 31.6 32.1 33.8   RDW 16.8* 16.9* 17.2* 16.9*    199 206 205       INR  Recent Labs   Lab 10/25/24  0603 10/24/24  0534 10/23/24  0804 10/22/24  0712   INR 2.29* 2.42* 2.26* 2.30*

## 2024-10-25 NOTE — PHARMACY
Antimicrobial Stewardship Team Note    Antimicrobial Stewardship Program - A joint venture between [facility] and  Physicians to optimize antibiotic management.  NOT a formal consult - Restricted Antimicrobial Review     Patient: Duane C Johnson  MRN: [id number]  Allergies: Brilinta [ticagrelor] and Clonazepam    Brief Summary: Duane Johnson is a 74-year-old male with PMH of STEMI in 10/2023 s/p PCI, HFrEF s/p LVAD placement on 9/18/24, afib on warfarin, HTN, and HLD who was admitted on 10/23/24 due to concern for stroke.    HPI: Patient presented to ED via EMS from LVAD clinic with right-sided weakness and hypotension following a routine cardiology appointment. Per clinic, MAPs of 38, which improved to 46 on recheck. IV fluids given en route to [facility]. Per ED provider notes, patient denied chest pain but endorsed slight difficulty breathing. Stroke Code was initiated. Blood cultures taken on 10/23. Patient was afebrile, WBCs WNL, and saturating at 97% O2 on room air upon presentation to [facility]. On 10/24, 1 of 2 bottles of the initial blood culture grew GPCs in clusters identified as methicillin-resistant Staph epidermidis (MRSE) on Verigene panel. A repeat blood culture was drawn on evening of 10/24 and empiric IV vancomycin was initiated.          Active Anti-infective Medications   (From admission, onward)                 Start     Stop    10/25/24 1730  vancomycin  1,000 mg,   Intravenous,   200 mL/hr,   EVERY 12 HOURS        Bacteremia       --                  Assessment: MRSE Blood Stream Infection (BSI) vs Blood Culture Contaminant  The patient remains on IV vancomycin for suspected MRSE BSI. Prior to initiation of antibiotics, MAPs improved and are now above goal of 65-85 (today 90 - 110). The repeat blood culture from 10/24 obtained prior to initiation of IV vancomycin is still in process. Patient remains afebrile, WBCs WNL, and is continuing to maintain adequate O2 saturation on room air.  Patient does not have a central line in place concerning for CLABSI. No reports of skin changes at LVAD site that would be concerning for SSTI. The positive blood culture most likely represents a contaminant rather than a true BSI given patient s lack of infectious signs and symptoms, improvement in MAPs prior to antibiotic initiation, and only 1 of 2 bottles of the blood culture grew MRSE on the second day of incubation. We recommend discontinuation of IV antibiotics and continued monitoring of patient off antibiotics.     Recommendation(s):   1). Discontinue IV vancomycin and continue to monitor repeat blood cultures    Discussed with ID Staff: Dr. Kim Jsoeph and Geovanna Salvador, PharmD   Anusha SuarezD  PGY-1 Pharmacy Resident    Vital Signs/Clinical Features:  Vitals         10/23 0700  10/24 0659 10/24 0700  10/25 0659 10/25 0700  10/25 1117   Most Recent      Temp ( F) 97.5 -  98.2    97.6 -  98.2      98     98 (36.7) 10/25 0811    Pulse 89 -  115    86 -  104    99 -  110     110 10/25 0916    Resp 16 -  18    16 -  18      16     16 10/25 0811    BP 80/65 -  92/76         --  Comment: Map 110 10/25 0438    SpO2 (%) 94 -  99    94 -  99    96 -  98     98 10/25 0916            Labs  Estimated Creatinine Clearance: 79.2 mL/min (based on SCr of 0.73 mg/dL).  Recent Labs   Lab Test 10/17/24  0629 10/21/24  0548 10/23/24  0926 10/23/24  0927 10/24/24  0534 10/24/24  2130   CR 0.84 0.67 0.77 0.6* 0.68 0.73       Recent Labs   Lab Test 02/23/17  0759 03/20/18  1157 05/26/21  0824 03/13/23  0905 10/13/24  0548 10/14/24  0617 10/17/24  0629 10/21/24  0548 10/23/24  0926 10/24/24  2130   WBC 5.3   < > 12.2*   < > 5.9 6.1 7.1 7.2 8.2 5.8   ANEU 2.7  --  10.1*  --   --   --   --   --   --   --    ALYM 2.0  --  1.5  --   --   --   --   --   --   --    NICHOLAS 0.5  --  0.5  --   --   --   --   --   --   --    AEOS 0.1  --  0.0  --   --   --   --   --   --   --    HGB 14.6   < > 12.0*   < > 9.5* 10.0* 10.3* 10.8* 11.4*   11.6* 9.9*   HCT 42.2   < > 34.8*   < > 29.0* 30.9* 31.3* 32.0* 35.5*  34* 31.3*   MCV 93   < > 97   < > 96 96 96 95 96 97      < > 274   < > 212 189 200 205 206 199    < > = values in this interval not displayed.       Recent Labs   Lab Test 09/24/24  0436 09/25/24  0520 09/26/24  0504 09/27/24  0441 10/09/24  1928 10/24/24  2130   BILITOTAL 0.4 0.4 0.3 0.3 0.8 0.3   ALKPHOS 69 68 79 76 113 104   ALBUMIN 2.3* 2.3* 2.4* 2.4* 2.6* 3.0*   AST 24 23 34 34 32 41   ALT 17 15 29 28 31 43       Recent Labs   Lab Test 10/30/23  0407 10/30/23  1002 12/30/23  1506 12/30/23  1524 09/20/24  1241 09/21/24  0324 09/22/24  0344 09/23/24  0318 09/24/24  0436 10/23/24  0933 10/24/24  2130   PCAL 0.12*  --  0.07  --   --   --   --   --   --   --   --    LACT 1.2   < >  --    < > 1.2 0.7 0.9 0.6* 0.8 1.1  --    CRPI  --   --   --   --   --   --   --   --   --   --  9.90*    < > = values in this interval not displayed.             Culture Results:  7-Day Micro Results       Procedure Component Value Units Date/Time    Blood Culture Arm, Left [[id number]]  (Normal) Collected: 10/24/24 2130    Order Status: Completed Lab Status: Preliminary result Updated: 10/25/24 1007    Specimen: Blood from Arm, Left      Culture No growth after 12 hours    Blood Culture Arm, Right [[id number]]  (Abnormal) Collected: 10/23/24 1751    Order Status: Completed Lab Status: Preliminary result Updated: 10/25/24 1115    Specimen: Blood from Arm, Right      Culture Positive on the 2nd day of incubation      Staphylococcus epidermidis     Comment: 1 of 2 bottles       Verigene GP Panel [[id number]]  (Abnormal) Collected: 10/23/24 1753    Order Status: Completed Lab Status: Final result Updated: 10/24/24 7531    Specimen: Blood from Arm, Right      Staphylococcus aureus Not Detected     Staphylococcus epidermidis Detected     Comment: Positive for methicillin-resistant Staphylococcus epidermidis (MRSE) by Puncheyigene multiplex nucleic acid test.  Final identification and antimicrobial susceptibility testing will be verified by standard methods.        Staphylococcus lugdunensis Not Detected     Enterococcus faecalis Not Detected     Enterococcus faecium Not Detected     Streptococcus species Not Detected     Streptococcus agalactiae Not Detected     Streptococcus anginosus group Not Detected     Streptococcus pneumoniae Not Detected     Streptococcus pyogenes Not Detected     Listeria species Not Detected    Narrative:      Specimen tested with Symplerigene multiplex, gram-positive blood culture nucleic acid test for the following targets: Staphylococcus aureus, Staphylococcus epidermidis, Staphylococcus lugdunensis, other Staphylococcus species, Enterococcus faecalis, Enterococcus faecium, Streptococcus species, Streptococcus agalactiae, Streptococcus anginosus group, Streptococcus pneumoniae, Streptococcus pyogenes, Listeria species, mecA (methicillin resistance), and Nimo/vanB (vancomycin resistance).            Recent Labs   Lab Test 12/30/23  1754 08/24/24  1445 08/26/24  1557 09/03/24  2325 09/19/24  0553   URINEPH 5.0 5.0 5.0 5.5 5.5   NITRITE Negative Negative Negative Negative Negative   LEUKEST Negative Negative Negative Negative Negative   WBCU <1 2 3 2 1                         Imaging: Cardiac Catheterization    Result Date: 10/24/2024    Right sided filling pressures are normal.   Left sided filling pressures are normal.   Normal PA pressures.   Normal cardiac output level.   Hemodynamic data has been modified in Epic per physician review.     CT Head w/o Contrast    Result Date: 10/24/2024  EXAM: CT HEAD W/O CONTRAST  10/24/2024 10:00 AM HISTORY:  follow up stroke code   COMPARISON:  CT head 10/23/2024 TECHNIQUE: Using multidetector thin collimation helical acquisition technique, axial, coronal and sagittal CT images from the skull base to the vertex were obtained without intravenous contrast.  (topogram) image(s) also obtained and reviewed.  FINDINGS: No acute intracranial hemorrhage, mass effect, or midline shift. Previously seen hyperdensity along the right cerebral convexity is not visualized on today's exam, favoring artifact on prior exam. No acute loss of gray-white matter differentiation in the cerebral hemispheres. Mild periventricular white matter hypodensities, likely secondary to chronic small vessel ischemic disease. Ventricles are proportionate to the cerebral sulci. Clear basal cisterns. The bony calvaria and the bones of the skull base are normal. Diffuse thickening of the left maxillary sinus with hypertrophy of the surrounding bone. Grossly normal orbits.     IMPRESSION: 1. No acute intracranial pathology. 2. Mucosal thickening of the left maxillary sinus with hypertrophy of the surrounding bone, this can be seen in chronic sinusitis. I have personally reviewed the examination and initial interpretation and I agree with the findings. MEHNAZ PINEDA MD   SYSTEM ID:  [id number]    CT Head w/o Contrast    Result Date: 10/23/2024  CT HEAD W/O CONTRAST 10/23/2024 9:46 AM History: Stroke.  Comparison: 10/9/2024 Technique: Using multidetector thin collimation helical acquisition technique, axial, coronal and sagittal CT images from the skull base to the vertex were obtained without intravenous contrast. Findings: There is no intracranial hemorrhage, mass effect or midline shift. There is no focal loss of gray-white matter differentiation, insular ribbon sign, or focally hyperdense artery to suggest acute infarction. The ventricles are proportionate to the cerebral sulci. Mild generalized parenchymal atrophy. Mild patchy hypoattenuation of the periventricular white matter which is nonspecific but favored to represent chronic small vessel ischemic disease given patient's age. Unchanged slightly asymmetric right frontal extra-axial space compared to the left. Subtle extra-axial hyperdensity on series 4 image 22 is favored artifactual. The bony  calvaria and the bones of the skull base appear normal. Chronic opacification of the left maxillary sinus. The mastoid air cells are clear.     Impression: 1. No acute intracranial pathology. 2. ASPECT Score: 10/10. [Stroke Code Result: Negative] Finding was identified on 10/23/2024 9:56 AM. Dr. Juan Diego Shin was contacted by Dr. Geovanny Mayes on 10/23/2024 9:57 AM and verbalized understanding of the result. [facility] Stroke Communication Guidelines: [place] ED: [phone] (EB ED) [place] [facility] Inpatient: [phone] ext. [phone] (Stroke Ascom) [facility] [place]patient: [phone] ([facility] ED) I have personally reviewed the examination and initial interpretation and I agree with the findings. SJ SAUCEDA MD   SYSTEM ID:  [id number]    CTA Head Neck with Contrast    Result Date: 10/23/2024  EXAM: CTA HEAD NECK W CONTRAST  10/23/2024 9:46 AM HISTORY:  stroke code, right arm weakness   COMPARISON:  Head CT 10/9/2024, CTA 9/29/2024 TECHNIQUE: HEAD and NECK CTA: During rapid bolus intravenous injection of nonionic contrast material, axial images were obtained using thin collimation multidetector helical technique from the base of the upper aortic arch through the [place]. This CT angiogram data was reconstructed at thin intervals with mild overlap. Images were sent to the 3D workstation, and 3D reconstructions were obtained. The axial source images, multiplanar reformations, 3D reconstructions in both maximum intensity projection display and volume rendered models were reviewed, with reconstructions performed by the technologist. CONTRAST: 67 mL Isovue-370 FINDINGS: Head CTA demonstrates no intracranial arterial aneurysm or stenosis. Neck CTA demonstrates conventional aortic arch branching pattern and patent great vessel origins. The normal distal right internal carotid artery measures 5 mm. The normal distal left internal carotid artery measures 5 mm. No vertebral artery stenosis. Mild atherosclerotic calcification of  the left carotid bulb. No acute finding in the visualized neck soft tissues. Left greater than right pleural effusions and diffuse interlobular septal thickening.     IMPRESSION:  1. Head CTA demonstrates no aneurysm or stenosis of the major intracranial arteries. 2. Neck CTA demonstrates no stenosis of the major cervical arteries. I have personally reviewed the examination and initial interpretation and I agree with the findings. SJ SAUCEDA MD   SYSTEM ID:  [id number]    Echocardiogram Complete - For age > 60 yrs    Result Date: 10/23/2024  [id number]^DENISE^CLOTILDE  [facility] [facility] [address] [place] [address]  Name: JOHNSON, DUANE C MRN: [id number] : 1950 Study Date: 10/23/2024 10:03 AM Age: 74 yrs Gender: Male Patient Location: [facility] Reason For Study: Cerebrovascular Incident Ordering Physician: CLOTILDE WALKER Performed By: Luther Tidwell  BSA: 1.7 m2 Height: 66 in Weight: 136 lb ______________________________________________________________________________ Procedure LVAD Echocardiogram with two-dimensional, color and spectral Doppler performed. ______________________________________________________________________________ Interpretation Summary LVAD cannula was seen in the expected anatomic position in the LV apex. HM3 at 5100RPM. LVIDd 47mm. Septum normal. Aortic valve open intermittently. No AI. Normal flow velocities. Global right ventricular function is mildly to moderately reduced. Small circumferential pericardial effusion is present without any hemodynamic significance. ______________________________________________________________________________ Left Ventricle The visual ejection fraction is <20%. LVAD cannula was seen in the expected anatomic position in the LV apex. HM3 at 5100RPM. LVIDd 47mm. Septum normal. Aortic valve open intermittently. No AI. Normal flow velocities.  Right Ventricle Global  right ventricular function is mildly to moderately reduced. A pacemaker lead is noted in the right ventricle.  Vessels IVC diameter >2.1 cm collapsing <50% with sniff suggests a high RA pressure estimated at 15 mmHg or greater.  Pericardium Small circumferential pericardial effusion is present without any hemodynamic significance. ______________________________________________________________________________ MMode/2D Measurements & Calculations  IVSd: 1.3 cm LVIDd: 4.7 cm LVIDs: 4.0 cm LVPWd: 1.3 cm FS: 14.8 % LV mass(C)d: 230.6 grams LV mass(C)dI: 135.8 grams/m2 RWT: 0.55  Doppler Measurements & Calculations TR max francisco j: 226.4 cm/sec TR max P.5 mmHg  ______________________________________________________________________________ Report approved by: Eusebio Terry 10/23/2024 11:58 AM       Cardiac Device Check - In Clinic    Result Date: 10/23/2024  Patient seen in [facility] for evaluation and iterative programming of their ICD per MD orders. Device: [employer] D533 RESONATE HF ICD Normal device function. Mode: DDDR  bpm AP: <1% : 5% Intrinsic rhythm: Aflutter w/ VS  bpm Lead Trends Appear Stable. Estimated battery longevity to RRT = 11 years.  Atrial Arrhythmia: Known PAF, This AF episode began on 10/10/24, V rates Avg  bpm.  Patient has been c/o dizziness. AF [place]: 37% Anticoagulant: Warfarin Ventricular Arrhythmia: 2 NSVT episodes detected, Stored EGM's show noise related to JOHN, pt had his LVAD implanted that day. Setting Changes: None Patient has an appointment to see Torrie LIGHT today. Plan: Device follow-up every 3 months. Lu Conteh, BLADE Dual lead ICD I have reviewed and interpreted the device interrogation, settings, programming and nurse's summary. The device is functioning within normal device parameters. I agree with the current findings, assessment and plan.    Echo Complete    Result Date: 10/18/2024  [id number]^BIJAL^DARCI^[facility]  [facility],[place] [facility] [address] [place] [address]  Name: JOHNSON, DUANE C MRN: [id number] : 1950 Study Date: 10/18/2024 02:17 PM Age: 74 yrs Gender: Male Patient Location: [facility] Reason For Study: Cardiac Device, Unspecified Ordering Physician: DARCI APPIAH Performed By: Aranza Nelson  BSA: 1.7 m2 Height: 66 in Weight: 134 lb HR: 75 ______________________________________________________________________________ Procedure LVAD Echocardiogram with two-dimensional, color and spectral Doppler performed. ______________________________________________________________________________ Interpretation Summary LVAD cannula was seen in the expected anatomic position in the LV apex. HM3 at 5100RPM. LVIDd 44mm. Septum normal. Aortic valve open with each systole. No AI. Normal flow velocities. Global right ventricular function is mildly to moderately reduced. Pulmonary artery systolic pressure is normal. The inferior vena cava is normal. ______________________________________________________________________________ Left Ventricle The visual ejection fraction is <20%. LVAD cannula was seen in the expected anatomic position in the LV apex. HM3 at 5100RPM. LVIDd 44mm. Septum normal. Aortic valve open with each systole. No AI. Normal flow velocities.  Right Ventricle Global right ventricular function is mildly to moderately reduced. A pacemaker lead is noted in the right ventricle.  Atria Both atria appear normal.  Mitral Valve The mitral valve is normal.  Aortic Valve No aortic regurgitation is present.  Tricuspid Valve The tricuspid valve is normal. Trace to mild tricuspid insufficiency is present. The right ventricular systolic pressure is approximated at 11.4 mmHg plus the right atrial pressure. Pulmonary artery systolic pressure is normal.  Vessels The inferior vena cava is normal.  ______________________________________________________________________________  MMode/2D Measurements & Calculations IVSd: 1.1 cm LVIDd: 4.4 cm LVIDs: 3.7 cm LVPWd: 1.2 cm FS: 15.4 % LV mass(C)d: 174.6 grams LV mass(C)dI: 103.5 grams/m2 RWT: 0.56  Doppler Measurements & Calculations TR max francisco j: 169.0 cm/sec TR max P.4 mmHg  ______________________________________________________________________________ Report approved by: Eusebio Terry 10/18/2024 03:17 PM

## 2024-10-25 NOTE — PROGRESS NOTES
Care Management Follow Up    Length of Stay (days): 2    Expected Discharge Date: 10/28/2024     Concerns to be Addressed: discharge planning     Patient plan of care discussed at interdisciplinary rounds: Yes    Anticipated Discharge Disposition: Home, Home Care              Anticipated Discharge Services:    Anticipated Discharge DME: None    Patient/family educated on Medicare website which has current facility and service quality ratings: no  Education Provided on the Discharge Plan: Yes  Patient/Family in Agreement with the Plan: yes    Referrals Placed by CM/SW: Internal Clinic Care Coordination, Homecare  Private pay costs discussed: Not applicable    Discussed  Partnership in Safe Discharge Planning  document with patient/family: Yes:      Handoff Completed: Yes, internal handoff sent    Additional Information:  Patient discharge plan discussed at discharge planning rounds, patient not medically ready for discharge until sometime next week. RNCC has called and cancelled INR appointment made for Monday at [facility], will need to reschedule when discharge date is known.    Next Steps:  Update [facility] prior to discharge. Schedule INR appt for [facility] when discharge date is known.      [facility]  Phone: [phone]  Fax: [phone]   RN/PT/OT         INR appointment scheduling, [facility]: [phone].         Jaylon Aguila BSN, BA, RN, CMSRN, RNCC  [facility]  Covering Units 6C Beds 9395-8585/OBS  Phone: [phone]  Available on Gigiter[employer] search 6C 2-14 RNCC or OBS RNCC  After Hours [phone]     6C Beds 5078-8385  Ph: [phone]     6B/CRNCC Weekend/holiday on Vocera or [phone]     [facility] RNCC ED/5 Ortho/5 Med/Surg [phone]     [facility] RNCC 6 Med/Surg 8A, [phone]     Observation SW and weekend/after hours phone: [phone]

## 2024-10-25 NOTE — PLAN OF CARE
Temp: 98  F (36.7  C) Temp src: Oral   Pulse: 95   Resp: 16 SpO2: 100 % O2 Device: None (Room air)      A:   Neuro: A/Ox4. Calls appropriately   Cardiac/Tele:  VVS. Afib. Afebrile. Denies chest pain   Respiratory: Room air. Tolerating well  GI/: Continent of bowel and bladder  Diet/Appetite: Soft to chew liquid diet  Skin: No new deficits noted  LDAs: PIV-SL  Activity: SBA  Pain: Denies pain    P: Continue to monitor and notify team with changes.

## 2024-10-25 NOTE — PHARMACY-VANCOMYCIN DOSING SERVICE
Pharmacy Vancomycin Initial Note  Date of Service 2024  Patient's  1950  74 year old, male    Indication: Bacteremia    Current estimated CrCl = Estimated Creatinine Clearance: 87.5 mL/min (based on SCr of 0.68 mg/dL).    Creatinine for last 3 days  10/23/2024:  9:26 AM Creatinine 0.77 mg/dL;  9:27 AM Creatinine POCT 0.6 mg/dL  10/24/2024:  5:34 AM Creatinine 0.68 mg/dL    Recent Vancomycin Level(s) for last 3 days  No results found for requested labs within last 3 days.      Vancomycin IV Administrations (past 72 hours)        No vancomycin orders with administrations in past 72 hours.                    Nephrotoxins and other renal medications (From now, onward)      Start     Dose/Rate Route Frequency Ordered Stop    10/25/24 1730  vancomycin (VANCOCIN) 1,000 mg in 200 mL dextrose intermittent infusion         1,000 mg  200 mL/hr over 1 Hours Intravenous EVERY 12 HOURS 10/25/24 0437      10/25/24 0530  vancomycin (VANCOCIN) 1,500 mg in 0.9% NaCl 265 mL intermittent infusion         1,500 mg  over 90 Minutes Intravenous ONCE 10/25/24 0437              Contrast Orders - past 72 hours (72h ago, onward)      None            InsightRX Prediction of Planned Initial Vancomycin Regimen  Loading dose: 1500 mg at 05:30 10/25/2024.  Regimen: 1000 mg IV every 12 hours.  Start time: 17:30 on 10/25/2024  Exposure target: AUC24 (range)400-600 mg/L.hr   AUC24,ss: 568 mg/L.hr  Probability of AUC24 > 400: 83 %  Ctrough,ss: 17.7 mg/L  Probability of Ctrough,ss > 20: 40 %  Probability of nephrotoxicity (Lodise KARLA ): 14 %          Plan:  Load vancomycin 1500mg once now  Followed by vancomycin  1000 mg IV q12h.   Vancomycin monitoring method: AUC  Vancomycin therapeutic monitoring goal: 400-600 mg*h/L  Pharmacy will check vancomycin levels as appropriate in 1-3 Days.    Serum creatinine levels will be ordered daily for the first week of therapy and at least twice weekly for subsequent weeks.      Ector NOLASCO  [place], [facility]  [phone]

## 2024-10-25 NOTE — PLAN OF CARE
[id number]    Diagnosis: hypotension, low flow alarms      Neuro: A&O x4, call appropriately.   Cardiac: Afib rate controlled.  Denied chest pain, dizziness, palpitations. LVAD HM3 WNL, MAPs 90-100s.   Respiratory: RA, sating >95%. Denied SOB.   GI/: WDL. Voiding via urinal with adequate output.   Diet/Appetite: Soft bite size diet, encourage fluids intake.   Skin: Old surgical incisions, LVAD site, Right internal jugular from heart cath, covered, CDI.   LDA: Left & right PIV SL.   Activity: SBA w/ walker and gait belt.   Pain: Denies pain.   Plan: Positive blood culture, notified provider and provider ordered a recheck of culture and CBC. Continue with POC.  Notify CARDS 2 of pertinent changes.

## 2024-10-26 LAB
ALBUMIN SERPL BCG-MCNC: 3 G/DL (ref 3.5–5.2)
ALP SERPL-CCNC: 114 U/L (ref 40–150)
ALT SERPL W P-5'-P-CCNC: 40 U/L (ref 0–70)
ANION GAP SERPL CALCULATED.3IONS-SCNC: 8 MMOL/L (ref 7–15)
AST SERPL W P-5'-P-CCNC: 37 U/L (ref 0–45)
BACTERIA BLD CULT: ABNORMAL
BACTERIA BLD CULT: ABNORMAL
BASOPHILS # BLD AUTO: 0.1 10E3/UL (ref 0–0.2)
BASOPHILS NFR BLD AUTO: 1 %
BILIRUB SERPL-MCNC: 0.5 MG/DL
BUN SERPL-MCNC: 16.5 MG/DL (ref 8–23)
CALCIUM SERPL-MCNC: 8.8 MG/DL (ref 8.8–10.4)
CHLORIDE SERPL-SCNC: 96 MMOL/L (ref 98–107)
CREAT SERPL-MCNC: 0.61 MG/DL (ref 0.67–1.17)
EGFRCR SERPLBLD CKD-EPI 2021: >90 ML/MIN/1.73M2
EOSINOPHIL # BLD AUTO: 0.1 10E3/UL (ref 0–0.7)
EOSINOPHIL NFR BLD AUTO: 1 %
ERYTHROCYTE [DISTWIDTH] IN BLOOD BY AUTOMATED COUNT: 16.4 % (ref 10–15)
ERYTHROCYTE [DISTWIDTH] IN BLOOD BY AUTOMATED COUNT: 16.4 % (ref 10–15)
GLUCOSE SERPL-MCNC: 105 MG/DL (ref 70–99)
HCO3 SERPL-SCNC: 24 MMOL/L (ref 22–29)
HCT VFR BLD AUTO: 31.1 % (ref 40–53)
HCT VFR BLD AUTO: 31.1 % (ref 40–53)
HGB BLD-MCNC: 9.9 G/DL (ref 13.3–17.7)
HGB BLD-MCNC: 9.9 G/DL (ref 13.3–17.7)
IMM GRANULOCYTES # BLD: 0 10E3/UL
IMM GRANULOCYTES NFR BLD: 0 %
INR PPP: 2.11 (ref 0.85–1.15)
LDH SERPL L TO P-CCNC: 257 U/L (ref 0–250)
LYMPHOCYTES # BLD AUTO: 0.4 10E3/UL (ref 0.8–5.3)
LYMPHOCYTES NFR BLD AUTO: 8 %
MCH RBC QN AUTO: 30.4 PG (ref 26.5–33)
MCH RBC QN AUTO: 30.4 PG (ref 26.5–33)
MCHC RBC AUTO-ENTMCNC: 31.8 G/DL (ref 31.5–36.5)
MCHC RBC AUTO-ENTMCNC: 31.8 G/DL (ref 31.5–36.5)
MCV RBC AUTO: 95 FL (ref 78–100)
MCV RBC AUTO: 95 FL (ref 78–100)
MDC_IDC_LEAD_CONNECTION_STATUS: NORMAL
MDC_IDC_LEAD_CONNECTION_STATUS: NORMAL
MDC_IDC_LEAD_IMPLANT_DT: NORMAL
MDC_IDC_LEAD_IMPLANT_DT: NORMAL
MDC_IDC_LEAD_LOCATION: NORMAL
MDC_IDC_LEAD_LOCATION: NORMAL
MDC_IDC_LEAD_LOCATION_DETAIL_1: NORMAL
MDC_IDC_LEAD_LOCATION_DETAIL_1: NORMAL
MDC_IDC_LEAD_MFG: NORMAL
MDC_IDC_LEAD_MFG: NORMAL
MDC_IDC_LEAD_MODEL: NORMAL
MDC_IDC_LEAD_MODEL: NORMAL
MDC_IDC_LEAD_POLARITY_TYPE: NORMAL
MDC_IDC_LEAD_POLARITY_TYPE: NORMAL
MDC_IDC_LEAD_SERIAL: NORMAL
MDC_IDC_LEAD_SERIAL: NORMAL
MDC_IDC_LEAD_SPECIAL_FUNCTION: NORMAL
MDC_IDC_LEAD_SPECIAL_FUNCTION: NORMAL
MDC_IDC_MSMT_BATTERY_DTM: NORMAL
MDC_IDC_MSMT_BATTERY_REMAINING_LONGEVITY: 132 MO
MDC_IDC_MSMT_BATTERY_REMAINING_PERCENTAGE: 100 %
MDC_IDC_MSMT_BATTERY_STATUS: NORMAL
MDC_IDC_MSMT_CAP_CHARGE_ENERGY: 0 J
MDC_IDC_MSMT_CAP_CHARGE_TIME: 0 S
MDC_IDC_MSMT_CAP_CHARGE_TYPE: NORMAL
MDC_IDC_MSMT_LEADCHNL_RA_IMPEDANCE_VALUE: 594 OHM
MDC_IDC_MSMT_LEADCHNL_RA_SENSING_INTR_AMPL: 2.3 MV
MDC_IDC_MSMT_LEADCHNL_RV_IMPEDANCE_VALUE: 353 OHM
MDC_IDC_MSMT_LEADCHNL_RV_PACING_THRESHOLD_AMPLITUDE: 0.7 V
MDC_IDC_MSMT_LEADCHNL_RV_PACING_THRESHOLD_PULSEWIDTH: 0.4 MS
MDC_IDC_MSMT_LEADCHNL_RV_SENSING_INTR_AMPL: 15.9 MV
MDC_IDC_PG_IMPLANT_DTM: NORMAL
MDC_IDC_PG_MFG: NORMAL
MDC_IDC_PG_MODEL: NORMAL
MDC_IDC_PG_SERIAL: NORMAL
MDC_IDC_PG_TYPE: NORMAL
MDC_IDC_SESS_CLINIC_NAME: NORMAL
MDC_IDC_SESS_DTM: NORMAL
MDC_IDC_SESS_TYPE: NORMAL
MDC_IDC_SET_BRADY_AT_MODE_SWITCH_MODE: NORMAL
MDC_IDC_SET_BRADY_AT_MODE_SWITCH_RATE: 170 {BEATS}/MIN
MDC_IDC_SET_BRADY_LOWRATE: 60 {BEATS}/MIN
MDC_IDC_SET_BRADY_MAX_SENSOR_RATE: 130 {BEATS}/MIN
MDC_IDC_SET_BRADY_MAX_TRACKING_RATE: 130 {BEATS}/MIN
MDC_IDC_SET_BRADY_MODE: NORMAL
MDC_IDC_SET_BRADY_PAV_DELAY_HIGH: 200 MS
MDC_IDC_SET_BRADY_PAV_DELAY_LOW: 300 MS
MDC_IDC_SET_BRADY_SAV_DELAY_HIGH: 200 MS
MDC_IDC_SET_BRADY_SAV_DELAY_LOW: 300 MS
MDC_IDC_SET_LEADCHNL_RA_PACING_AMPLITUDE: 2 V
MDC_IDC_SET_LEADCHNL_RA_PACING_CAPTURE_MODE: NORMAL
MDC_IDC_SET_LEADCHNL_RA_PACING_POLARITY: NORMAL
MDC_IDC_SET_LEADCHNL_RA_PACING_PULSEWIDTH: 0.4 MS
MDC_IDC_SET_LEADCHNL_RA_SENSING_ADAPTATION_MODE: NORMAL
MDC_IDC_SET_LEADCHNL_RA_SENSING_POLARITY: NORMAL
MDC_IDC_SET_LEADCHNL_RA_SENSING_SENSITIVITY: 0.25 MV
MDC_IDC_SET_LEADCHNL_RV_PACING_AMPLITUDE: 2 V
MDC_IDC_SET_LEADCHNL_RV_PACING_CAPTURE_MODE: NORMAL
MDC_IDC_SET_LEADCHNL_RV_PACING_POLARITY: NORMAL
MDC_IDC_SET_LEADCHNL_RV_PACING_PULSEWIDTH: 0.4 MS
MDC_IDC_SET_LEADCHNL_RV_SENSING_ADAPTATION_MODE: NORMAL
MDC_IDC_SET_LEADCHNL_RV_SENSING_POLARITY: NORMAL
MDC_IDC_SET_LEADCHNL_RV_SENSING_SENSITIVITY: 0.6 MV
MDC_IDC_SET_ZONE_DETECTION_INTERVAL: 250 MS
MDC_IDC_SET_ZONE_DETECTION_INTERVAL: 300 MS
MDC_IDC_SET_ZONE_DETECTION_INTERVAL: 353 MS
MDC_IDC_SET_ZONE_STATUS: NORMAL
MDC_IDC_SET_ZONE_TYPE: NORMAL
MDC_IDC_SET_ZONE_VENDOR_TYPE: NORMAL
MDC_IDC_STAT_BRADY_DTM_END: NORMAL
MDC_IDC_STAT_BRADY_DTM_START: NORMAL
MDC_IDC_STAT_BRADY_RA_PERCENT_PACED: 1 %
MDC_IDC_STAT_BRADY_RV_PERCENT_PACED: 5 %
MDC_IDC_STAT_EPISODE_RECENT_COUNT: 0
MDC_IDC_STAT_EPISODE_RECENT_COUNT_DTM_END: NORMAL
MDC_IDC_STAT_EPISODE_RECENT_COUNT_DTM_START: NORMAL
MDC_IDC_STAT_EPISODE_TOTAL_COUNT: 0
MDC_IDC_STAT_EPISODE_TOTAL_COUNT: 0
MDC_IDC_STAT_EPISODE_TOTAL_COUNT: 2
MDC_IDC_STAT_EPISODE_TOTAL_COUNT_DTM_END: NORMAL
MDC_IDC_STAT_EPISODE_TYPE: NORMAL
MDC_IDC_STAT_EPISODE_VENDOR_TYPE: NORMAL
MDC_IDC_STAT_TACHYTHERAPY_ATP_DELIVERED_RECENT: 0
MDC_IDC_STAT_TACHYTHERAPY_ATP_DELIVERED_TOTAL: 0
MDC_IDC_STAT_TACHYTHERAPY_RECENT_DTM_END: NORMAL
MDC_IDC_STAT_TACHYTHERAPY_RECENT_DTM_START: NORMAL
MDC_IDC_STAT_TACHYTHERAPY_SHOCKS_ABORTED_RECENT: 0
MDC_IDC_STAT_TACHYTHERAPY_SHOCKS_ABORTED_TOTAL: 0
MDC_IDC_STAT_TACHYTHERAPY_SHOCKS_DELIVERED_RECENT: 0
MDC_IDC_STAT_TACHYTHERAPY_SHOCKS_DELIVERED_TOTAL: 0
MDC_IDC_STAT_TACHYTHERAPY_TOTAL_DTM_END: NORMAL
MONOCYTES # BLD AUTO: 0.6 10E3/UL (ref 0–1.3)
MONOCYTES NFR BLD AUTO: 10 %
NEUTROPHILS # BLD AUTO: 4.7 10E3/UL (ref 1.6–8.3)
NEUTROPHILS NFR BLD AUTO: 80 %
NRBC # BLD AUTO: 0 10E3/UL
NRBC BLD AUTO-RTO: 0 /100
PLATELET # BLD AUTO: 189 10E3/UL (ref 150–450)
PLATELET # BLD AUTO: 189 10E3/UL (ref 150–450)
POTASSIUM SERPL-SCNC: 4 MMOL/L (ref 3.4–5.3)
PROT SERPL-MCNC: 6.2 G/DL (ref 6.4–8.3)
RBC # BLD AUTO: 3.26 10E6/UL (ref 4.4–5.9)
RBC # BLD AUTO: 3.26 10E6/UL (ref 4.4–5.9)
SODIUM SERPL-SCNC: 128 MMOL/L (ref 135–145)
WBC # BLD AUTO: 5.8 10E3/UL (ref 4–11)
WBC # BLD AUTO: 5.8 10E3/UL (ref 4–11)

## 2024-10-26 PROCEDURE — 250N000013 HC RX MED GY IP 250 OP 250 PS 637: Performed by: NURSE PRACTITIONER

## 2024-10-26 PROCEDURE — 93750 INTERROGATION VAD IN PERSON: CPT | Performed by: PHYSICIAN ASSISTANT

## 2024-10-26 PROCEDURE — 36415 COLL VENOUS BLD VENIPUNCTURE: CPT | Performed by: NURSE PRACTITIONER

## 2024-10-26 PROCEDURE — 83615 LACTATE (LD) (LDH) ENZYME: CPT

## 2024-10-26 PROCEDURE — 80053 COMPREHEN METABOLIC PANEL: CPT | Performed by: PHYSICIAN ASSISTANT

## 2024-10-26 PROCEDURE — 120N000003 HC R&B IMCU UMMC

## 2024-10-26 PROCEDURE — 82310 ASSAY OF CALCIUM: CPT | Performed by: PHYSICIAN ASSISTANT

## 2024-10-26 PROCEDURE — 36415 COLL VENOUS BLD VENIPUNCTURE: CPT | Performed by: PHYSICIAN ASSISTANT

## 2024-10-26 PROCEDURE — 99232 SBSQ HOSP IP/OBS MODERATE 35: CPT | Mod: 25 | Performed by: PHYSICIAN ASSISTANT

## 2024-10-26 PROCEDURE — 250N000013 HC RX MED GY IP 250 OP 250 PS 637: Performed by: STUDENT IN AN ORGANIZED HEALTH CARE EDUCATION/TRAINING PROGRAM

## 2024-10-26 PROCEDURE — 36415 COLL VENOUS BLD VENIPUNCTURE: CPT

## 2024-10-26 PROCEDURE — 84075 ASSAY ALKALINE PHOSPHATASE: CPT | Performed by: PHYSICIAN ASSISTANT

## 2024-10-26 PROCEDURE — 85025 COMPLETE CBC W/AUTO DIFF WBC: CPT | Performed by: NURSE PRACTITIONER

## 2024-10-26 PROCEDURE — 82947 ASSAY GLUCOSE BLOOD QUANT: CPT | Performed by: PHYSICIAN ASSISTANT

## 2024-10-26 PROCEDURE — 250N000013 HC RX MED GY IP 250 OP 250 PS 637: Performed by: INTERNAL MEDICINE

## 2024-10-26 PROCEDURE — 85610 PROTHROMBIN TIME: CPT | Performed by: NURSE PRACTITIONER

## 2024-10-26 PROCEDURE — 250N000013 HC RX MED GY IP 250 OP 250 PS 637: Performed by: PHYSICIAN ASSISTANT

## 2024-10-26 PROCEDURE — 85025 COMPLETE CBC W/AUTO DIFF WBC: CPT | Performed by: PHYSICIAN ASSISTANT

## 2024-10-26 RX ORDER — WARFARIN SODIUM 3 MG/1
3 TABLET ORAL ONCE
Status: COMPLETED | OUTPATIENT
Start: 2024-10-26 | End: 2024-10-26

## 2024-10-26 RX ADMIN — ESCITALOPRAM OXALATE 10 MG: 10 TABLET ORAL at 08:14

## 2024-10-26 RX ADMIN — LOSARTAN POTASSIUM 50 MG: 50 TABLET, FILM COATED ORAL at 19:59

## 2024-10-26 RX ADMIN — SPIRONOLACTONE 12.5 MG: 25 TABLET, FILM COATED ORAL at 09:57

## 2024-10-26 RX ADMIN — WARFARIN SODIUM 3 MG: 3 TABLET ORAL at 14:52

## 2024-10-26 RX ADMIN — MAGNESIUM OXIDE TAB 400 MG (241.3 MG ELEMENTAL MG) 400 MG: 400 (241.3 MG) TAB at 08:14

## 2024-10-26 RX ADMIN — LOSARTAN POTASSIUM 50 MG: 50 TABLET, FILM COATED ORAL at 08:14

## 2024-10-26 RX ADMIN — Medication 62.5 MG: at 04:17

## 2024-10-26 RX ADMIN — ATORVASTATIN CALCIUM 80 MG: 80 TABLET, FILM COATED ORAL at 19:59

## 2024-10-26 RX ADMIN — Medication 62.5 MG: at 12:26

## 2024-10-26 RX ADMIN — Medication 62.5 MG: at 18:22

## 2024-10-26 RX ADMIN — Medication 62.5 MG: at 23:55

## 2024-10-26 RX ADMIN — FLUDROCORTISONE ACETATE 0.1 MG: 0.1 TABLET ORAL at 08:14

## 2024-10-26 RX ADMIN — Medication 1 TABLET: at 08:14

## 2024-10-26 RX ADMIN — AMIODARONE HYDROCHLORIDE 200 MG: 200 TABLET ORAL at 08:14

## 2024-10-26 RX ADMIN — GABAPENTIN 100 MG: 100 CAPSULE ORAL at 22:00

## 2024-10-26 NOTE — PLAN OF CARE
"Shift Note: [id number]     Neuro: A&O x4, denied pain and dizziness.  Respiratory:  room air; SpO2 >94.  Cardiac: Afib w/BBB on telemetry; rates 90's. HM3 LVAD running at a fixed rate of 5100 rpms. Multiple low flow alarms; 1.9-2.4. patient asymptomatic. Drive line site clean/dry/intact. Maps 90's.  GI: Denied nausea, bowel sounds present. No bm overnight.   : adequate urine output; see flowsheet for output   Skin: No new deficits noted during shift; generalized bruising noted and right internal jugular site open to air  Mobility: stand by assist with rolling walker     Plan: Continue with plan of care and notify primary team of any changes.     Shift Events:  - 2153: Notified provider (KATERIN Cruz) about patient frequent low flow alarms; 1.9-2.4 and MAPs 100's. Patient asymptomatic. Scheduled hydralazine given. MD arrived to bedside and ordered labs.   - 0308: Notified provider (KATERIN Cruz) about patient continuous low flow alarms and elevated MAPs; flows (2.3) map (108). MD instructed to give 0600 Hydralazine early. Recheck 110.     Goal Outcome Evaluation:      Plan of Care Reviewed With: patient    Overall Patient Progress: no changeOverall Patient Progress: no change    Outcome Evaluation: VSS, encourage ambulation    Problem: Adult Inpatient Plan of Care  Goal: Plan of Care Review  Description: The Plan of Care Review/Shift note should be completed every shift.  The Outcome Evaluation is a brief statement about your assessment that the patient is improving, declining, or no change.  This information will be displayed automatically on your shift  note.  Outcome: Progressing  Flowsheets (Taken 10/25/2024 2103)  Outcome Evaluation: VSS, encourage ambulation  Plan of Care Reviewed With: patient  Overall Patient Progress: no change  Goal: Patient-Specific Goal (Individualized)  Description: You can add care plan individualizations to a care plan. Examples of Individualization might be:  \"Parent requests to be " "called daily at 9am for status\", \"I have a hard time hearing out of my right ear\", or \"Do not touch me to wake me up as it startles  me\".  Outcome: Progressing  Goal: Absence of Hospital-Acquired Illness or Injury  Outcome: Progressing  Intervention: Identify and Manage Fall Risk  Recent Flowsheet Documentation  Taken 10/25/2024 2003 by Lolis Armendariz RN  Safety Promotion/Fall Prevention:   activity supervised   clutter free environment maintained   increased rounding and observation   lighting adjusted   mobility aid in reach  Intervention: Prevent Skin Injury  Recent Flowsheet Documentation  Taken 10/25/2024 2003 by Lolis Armendariz RN  Body Position: position changed independently  Intervention: Prevent and Manage VTE (Venous Thromboembolism) Risk  Recent Flowsheet Documentation  Taken 10/25/2024 2003 by Lolis Armendariz RN  VTE Prevention/Management: SCDs off (sequential compression devices)  Intervention: Prevent Infection  Recent Flowsheet Documentation  Taken 10/25/2024 2003 by Lolis Armendariz RN  Infection Prevention:   single patient room provided   hand hygiene promoted  Goal: Optimal Comfort and Wellbeing  Outcome: Progressing  Goal: Readiness for Transition of Care  Outcome: Progressing     Problem: Ventricular Assist Device  Goal: Optimal Adjustment to Device  Outcome: Progressing  Goal: Absence of Bleeding  Outcome: Progressing  Goal: Optimal Device Function  Outcome: Progressing  Goal: Absence of Embolism Signs and Symptoms  Outcome: Progressing  Intervention: Prevent or Manage Embolism  Recent Flowsheet Documentation  Taken 10/25/2024 2003 by Lolis Armendariz RN  VTE Prevention/Management: SCDs off (sequential compression devices)  Goal: Optimal Functional Ability  Outcome: Progressing  Intervention: Optimize Functional Ability  Recent Flowsheet Documentation  Taken 10/25/2024 2003 by Lolis Armendariz RN  Activity Management: activity adjusted per tolerance  Goal: Optimal Blood " Flow  Outcome: Progressing  Goal: Absence of Infection Signs and Symptoms  Outcome: Progressing  Intervention: Prevent or Manage Infection  Recent Flowsheet Documentation  Taken 10/25/2024 2003 by Lolis Armendariz RN  Infection Prevention:   single patient room provided   hand hygiene promoted     Problem: Delirium  Goal: Optimal Coping  Outcome: Progressing  Goal: Improved Behavioral Control  Outcome: Progressing  Intervention: Minimize Safety Risk  Recent Flowsheet Documentation  Taken 10/25/2024 2003 by Lolis Armendariz RN  Enhanced Safety Measures:   pain management   room near unit station  Goal: Improved Attention and Thought Clarity  Outcome: Progressing  Goal: Improved Sleep  Outcome: Progressing     Problem: Heart Failure  Goal: Optimal Coping  Outcome: Progressing  Goal: Optimal Cardiac Output  Outcome: Progressing  Goal: Stable Heart Rate and Rhythm  Outcome: Progressing  Goal: Fluid and Electrolyte Balance  Outcome: Progressing  Goal: Optimal Functional Ability  Outcome: Progressing  Intervention: Optimize Functional Ability  Recent Flowsheet Documentation  Taken 10/25/2024 2003 by Lolis Armendariz RN  Activity Management: activity adjusted per tolerance  Goal: Improved Oral Intake  Outcome: Progressing  Goal: Effective Oxygenation and Ventilation  Outcome: Progressing  Intervention: Promote Airway Secretion Clearance  Recent Flowsheet Documentation  Taken 10/25/2024 2003 by Lolis Armendariz RN  Cough And Deep Breathing: done independently per patient  Activity Management: activity adjusted per tolerance  Intervention: Optimize Oxygenation and Ventilation  Recent Flowsheet Documentation  Taken 10/25/2024 2003 by Lolis Armendariz RN  Head of Bed (HOB) Positioning: HOB at 30-45 degrees  Goal: Effective Breathing Pattern During Sleep  Outcome: Progressing  Intervention: Monitor and Manage Obstructive Sleep Apnea  Recent Flowsheet Documentation  Taken 10/25/2024 2003 by Lolis Armendariz  RN  Medication Review/Management: medications reviewed

## 2024-10-26 NOTE — PLAN OF CARE
Goal Outcome Evaluation:      Plan of Care Reviewed With: patient, friend               Neuro: A&O up with SBA.  Respiratory:  On room air and denies SOB.  Cardiac: Afib rates 90's, maps in 90s-80s,  HM3 LVAD running at a fixed rate of 5100 rpms. No low flow alarms during the shift.  Patient postponed Drive line dressing while having visitors.  Hydralazine lowered to 62.5mg.  GI: Denied nausea, bowel sounds present. No bm during the shift.   : Encouraged to drink fluid adequate and  voiding via urine.   Plan: Continue to monitor and notify MD of status changes.

## 2024-10-26 NOTE — PROGRESS NOTES
The patient's HeartMate LVAD was interrogated 10/26/2024  * Speed 5100 rpm   * Pulsatility index 4.6   * Power 3.3 Dahl   * Flow 3.5 L/minute   Fluid status: hypovolemic   Alarms were reviewed, and notable for many low flow alarms between 4 am and 5 am, history only goes back to 4 am.   The driveline exit site was inspected, c/d/i.   All external components were inspected and showed no evidence of damage or malfunction, none replaced.   No changes to VAD settings made

## 2024-10-26 NOTE — PROGRESS NOTES
[facility]   Cardiology II Service / Advanced Heart Failure  Daily Progress Note      Patient: Duane C Johnson  MRN: [id number]  Admission Date: 10/23/2024  Hospital Day # 3    Assessment and Plan: Duane C Johnson is a 74 year old male who presents from LVAD clinic with hypotension, dizziness, and new right arm weakness. Doppler MAP in clinic ranging 45-48.      In reviewing the TCU chart, MAPS have been ranging from 60-84 with well controled HRs in the 50s-60s. For afterload his hydralazine was decreased yesterday from 100 mg q8h to 75 mg every 8 hours. He is still getting losartan 50 mg BID, although one dose was held yesterday. Last low flow alarm 10/19. He had several prolonged low flow alarms on 10/17 for which hydralazine was increased. CT-A was done on 10/17/24 with no outflow graft ostruction. The outflow graft does make an acute angle as it approaches the aorta.    Today's Plan:  - hydralazine increased to 62.5 mg  q6h  (has hold parameters, had very severe hypotension at 75 mg TID)  - encouraged OOB and walking with staff   - Encouraged increased fluid intake- goal of 7, 16 oz glassess of water, with at least 2 of these glasses in the hour prior to bedtime    # Acute on chronic systolic heart failure secondary to ICM   # s/p HM3 LVAD as DT on 9/18/2024  # CAD s/p PCI  # History of STEMI  # s/p ICD 5/2024  # Acute Angle of Outflow Graft  Stage D. NYHA Class III.    Ra 6, PA 40/14/20, PCW 9, Goldy CO/CI 4.54/2.59    Fluid status: euvolemic   ACEi/ARB/ARNi:   losartan 50 BID  Vasodilator: increase hydralazine to 62.5 mg q6h given ongoing hypertension. Had significant hypotension on 75 mg TID.  BB: Recent LVAD, will defer to outpatient  Aldosterone antagonist deferred while other medical therapy is prioritized  SGLT2i: hold Empagliflozin 10 mg d/t propensity for hypovolemia  SCD prophylaxis: ICD  MAP: goal 65-85, elevated   LDH trends: 257 on 10/26, stable. Monitor weekly   Anticoagulation:  warfarin dosing per pharmacy, INR goal 2-3, today 2.11  Antiplatelet: ASA not indicated in LVAD population, > 6 months s/p STEMI     # Low flow alarms  # Elevated PIs  # Labile MAPs   # Suspected orthostatic hypotension   Speed optimization echo performed 10/1, speed decreased to 5100. Low flows seems releated to hypertension and some hypovolemia as well. Have improved with re-timing hydralazine and after IV fluids. CT-A was done on  10/17/24 with no outflow graft ostruction. The outflow graft does make an acute angle as it approaches the aorta, discussed with Dr. Valentine, unlikely to be impacting the low flows at this time.  Several low flow alarms 10/24 in the AM. MAP . Overnight hydralazine was held for  MAP of 70-unclear why med was held. Then more low flow alarms AM of 10/25 in the setting of elevated MAPS, resolved with his hydralazine given early and pushed po fluids.  - Losartan 50 mg BID  - Hydralazine 62.5 mg q8h  - Fludrocortisone 0.1 mg daily   - Increase PO intake as above     # History of VT/VF arrest 2023  # AFib s/p DCCV 9/2024 and again 9/30/2024  - Successfully cardioverted in early September for AF. Since then EKG suggested AF on 9/21. Tele is only available from as early as 9/22. The patient was started on hep gtt 9/21, but unclear if was in AF before this. While the patient was on hep gtt until INR was therapeutic, it is not possible to assess rhythm prior to 9/21. Systemic AC was off on 9/18-9/20. S/p MELBA and DCCV on 9/30 with conversion to sinus rhythm.  10/23 device interrogation shows persistent episode of AF since 10/10 with rates   - Continue amiodarone 200 mg daily   - Continue AC as above     # Visual changes, resolved.   # Generalized weakness  # Headache  Stoke code called 9/29 for visual changes - right eye with decreased upper half of vision and bluish haze. Resolved after 30 minutes. Seen by opthalmology and neuro. Negative head CT and CTA head and neck. He has had 3  "episodes which last about 5 minutes before resolving completely.      On 10/9 Stroke code activated due to concern of generalized weakness, numbness and headache. LKW reported as 0715 when he worked with therapies and shortly after started feeling generally weak. Then around 1500 he had onset of moderate \"tension\" type headache and tingling in the bilateral fingertips. Per RRT URIAH patient now reports tingling has resolved and headache is improving. MAP 48, INR 2.2. Given absence of focal deficits and rapidly improving symptoms, opted to cancel stroke.  CT head negative, though did show chronic maxillary sinusitis.      Stroke code called 10/23 when seen in clinic with report of new RUE weakness. He was notably hypotensive at this time with MAP ~45 with new right pronator drift. Evaluated in ED, CT and CTA head and neck negative. RUE weakness resolved.   - Already on A/C  - Continue to monitor for changes     # Hypovolemia hyponatremia  Na 128, ranging 128-133.   - encourage PO intake  - daily BMP  - hold empagliflozin 10 mg daily     # Blood culture postivie for staph epi. 1/2 bottles. No fevers. No symptoms  - Got one dose of Iv vanco, stopped given likely contaminent  - Monitor for further symptoms  - Repeat blood culture pending from 10/25 NGTD, continue to follow    FEN: Combination diet  PROPHY:  warfarin  LINES:  PIV  DISPO:  2-3 days  CODE STATUS:  Full Code      Time/Communication  I spent 40 minutes reviewing results, care team notes, meeting directly with the patient, coordinating care, and completing documentation on the day of service.     Patient discussed with Dr. Anthony Forrest PA-C  Advanced Heart Failure/Cardiology II Service  [facility] or Pager [phone]  ================================================================    Subjective/24-Hr Events:   Last 24 hr care team notes reviewed. Several low flows this morning that occurred with elevated MAPs. Asymptomatic at time of " "low flows. Resolved with getting his hydralazine a bit earlier. Breathing comfortable. Denies lightheadedness/dizziness,felt really good walking without any dizziness. /he is excited to walk more today. No infectious symptoms. No pain. Denies HA, numbness, weakness. No stroke symptoms.  No s/s of bleeding.  No Le edema or abdominal edema. No driveline concerns.    ROS:  4 point ROS including respiratory, CV, GI and  (other than that noted in the HPI) is negative.     Medications: Reviewed in EPIC.     Physical Exam:   BP (!) 80/65   Pulse 110   Temp 98.4  F (36.9  C) (Oral)   Resp 14   Ht 1.702 m (5' 7\")   Wt 63.1 kg (139 lb 1.6 oz)   SpO2 95%   BMI 21.79 kg/m      GENERAL: Appears comfortable, in no distress.  HEENT: Eye symmetrical, no discharge or icterus bilaterally.   NECK: Supple, JVD at clavicle at 90 degrees.   CV: tachycardic, irregular, +LVAD hum   RESPIRATORY: Respirations regular, even, and unlabored. Lungs CTA throughout.    GI: Soft and non distended with normoactive bowel sounds present in all quadrants. No tenderness, rebound, guarding.   EXTREMITIES: No peripheral edema. All extremities are warm and well perfused  NEUROLOGIC: Alert and interacting appropriatly. No focal deficits.   MUSCULOSKELETAL: No joint swelling or tenderness.   SKIN: No jaundice. No rashes or lesions.     Labs:  CMP  Recent Labs   Lab 10/26/24  0842 10/25/24  1320 10/24/24  2130 10/24/24  2103 10/24/24  0626 10/24/24  0534 10/23/24  2233 10/23/24  1137 10/23/24  0927 10/23/24  0926   * 127* 130*  --   --  131*  --   --   --  129*  132*   POTASSIUM 4.0 4.2 4.8  --   --  4.2  --   --   --  3.8  4.5   CHLORIDE 96* 96* 97*  --   --  99  --   --   --  94*   CO2 24 24 24  --   --  26  --   --   --  23   ANIONGAP 8 7 9  --   --  6*  --   --   --  12   * 134* 113* 120*   < > 91   < >  --   --  97  108*   BUN 16.5 20.1 18.9  --   --  23.9*  --   --   --  24.4*   CR 0.61* 0.62* 0.73  --   --  0.68  --   --    < > " 0.77   GFRESTIMATED >90 >90 >90  --   --  >90  --   --    < > >90   PRANAV 8.8 8.4* 8.5*  --   --  8.5*  --   --   --  8.6*   MAG  --   --   --   --   --   --   --  1.8  --  1.9   PROTTOTAL 6.2* 6.0* 6.0*  --   --   --   --   --   --   --    ALBUMIN 3.0* 2.9* 3.0*  --   --   --   --   --   --   --    BILITOTAL 0.5 0.4 0.3  --   --   --   --   --   --   --    ALKPHOS 114 110 104  --   --   --   --   --   --   --    AST 37 38 41  --   --   --   --   --   --   --    ALT 40 37 43  --   --   --   --   --   --   --     < > = values in this interval not displayed.       CBC  Recent Labs   Lab 10/26/24  0628 10/25/24  1320 10/24/24  2130 10/23/24  0926   WBC 5.8  5.8 6.0 5.8 8.2   RBC 3.26*  3.26* 3.26* 3.22* 3.70*   HGB 9.9*  9.9* 10.1* 9.9* 11.4*  11.6*   HCT 31.1*  31.1* 31.3* 31.3* 35.5*  34*   MCV 95  95 96 97 96   MCH 30.4  30.4 31.0 30.7 30.8   MCHC 31.8  31.8 32.3 31.6 32.1   RDW 16.4*  16.4* 16.8* 16.9* 17.2*     189 185 199 206       INR  Recent Labs   Lab 10/26/24  0628 10/25/24  0603 10/24/24  0534 10/23/24  0804   INR 2.11* 2.29* 2.42* 2.26*

## 2024-10-27 ENCOUNTER — APPOINTMENT (OUTPATIENT)
Dept: OCCUPATIONAL THERAPY | Facility: CLINIC | Age: 74
DRG: 286 | End: 2024-10-27
Payer: MEDICARE

## 2024-10-27 LAB
ALBUMIN SERPL BCG-MCNC: 3 G/DL (ref 3.5–5.2)
ALP SERPL-CCNC: 116 U/L (ref 40–150)
ALT SERPL W P-5'-P-CCNC: 41 U/L (ref 0–70)
ANION GAP SERPL CALCULATED.3IONS-SCNC: 10 MMOL/L (ref 7–15)
AST SERPL W P-5'-P-CCNC: 49 U/L (ref 0–45)
BILIRUB SERPL-MCNC: 0.4 MG/DL
BUN SERPL-MCNC: 19.5 MG/DL (ref 8–23)
CALCIUM SERPL-MCNC: 8.5 MG/DL (ref 8.8–10.4)
CHLORIDE SERPL-SCNC: 96 MMOL/L (ref 98–107)
CREAT SERPL-MCNC: 0.61 MG/DL (ref 0.67–1.17)
EGFRCR SERPLBLD CKD-EPI 2021: >90 ML/MIN/1.73M2
GLUCOSE SERPL-MCNC: 91 MG/DL (ref 70–99)
HCO3 SERPL-SCNC: 23 MMOL/L (ref 22–29)
INR PPP: 2.47 (ref 0.85–1.15)
LDH SERPL L TO P-CCNC: 344 U/L (ref 0–250)
POTASSIUM SERPL-SCNC: 4.1 MMOL/L (ref 3.4–5.3)
PROT SERPL-MCNC: 6.1 G/DL (ref 6.4–8.3)
SODIUM SERPL-SCNC: 129 MMOL/L (ref 135–145)

## 2024-10-27 PROCEDURE — 82247 BILIRUBIN TOTAL: CPT | Performed by: PHYSICIAN ASSISTANT

## 2024-10-27 PROCEDURE — 250N000013 HC RX MED GY IP 250 OP 250 PS 637: Performed by: NURSE PRACTITIONER

## 2024-10-27 PROCEDURE — 97110 THERAPEUTIC EXERCISES: CPT | Mod: GO

## 2024-10-27 PROCEDURE — 97535 SELF CARE MNGMENT TRAINING: CPT | Mod: GO

## 2024-10-27 PROCEDURE — 85610 PROTHROMBIN TIME: CPT | Performed by: NURSE PRACTITIONER

## 2024-10-27 PROCEDURE — 250N000013 HC RX MED GY IP 250 OP 250 PS 637: Performed by: INTERNAL MEDICINE

## 2024-10-27 PROCEDURE — 99232 SBSQ HOSP IP/OBS MODERATE 35: CPT | Mod: 25 | Performed by: PHYSICIAN ASSISTANT

## 2024-10-27 PROCEDURE — 82435 ASSAY OF BLOOD CHLORIDE: CPT | Performed by: PHYSICIAN ASSISTANT

## 2024-10-27 PROCEDURE — 250N000013 HC RX MED GY IP 250 OP 250 PS 637: Performed by: STUDENT IN AN ORGANIZED HEALTH CARE EDUCATION/TRAINING PROGRAM

## 2024-10-27 PROCEDURE — 120N000003 HC R&B IMCU UMMC

## 2024-10-27 PROCEDURE — 250N000013 HC RX MED GY IP 250 OP 250 PS 637: Performed by: PHYSICIAN ASSISTANT

## 2024-10-27 PROCEDURE — 83615 LACTATE (LD) (LDH) ENZYME: CPT | Performed by: PHYSICIAN ASSISTANT

## 2024-10-27 PROCEDURE — 82040 ASSAY OF SERUM ALBUMIN: CPT | Performed by: PHYSICIAN ASSISTANT

## 2024-10-27 PROCEDURE — 93750 INTERROGATION VAD IN PERSON: CPT | Performed by: PHYSICIAN ASSISTANT

## 2024-10-27 PROCEDURE — 36415 COLL VENOUS BLD VENIPUNCTURE: CPT | Performed by: PHYSICIAN ASSISTANT

## 2024-10-27 RX ORDER — WARFARIN SODIUM 2.5 MG/1
2.5 TABLET ORAL
Status: COMPLETED | OUTPATIENT
Start: 2024-10-27 | End: 2024-10-27

## 2024-10-27 RX ORDER — AMLODIPINE BESYLATE 2.5 MG/1
2.5 TABLET ORAL DAILY
Status: DISCONTINUED | OUTPATIENT
Start: 2024-10-27 | End: 2024-10-29

## 2024-10-27 RX ADMIN — ESCITALOPRAM OXALATE 10 MG: 10 TABLET ORAL at 08:50

## 2024-10-27 RX ADMIN — LOSARTAN POTASSIUM 50 MG: 50 TABLET, FILM COATED ORAL at 08:50

## 2024-10-27 RX ADMIN — GABAPENTIN 100 MG: 100 CAPSULE ORAL at 21:42

## 2024-10-27 RX ADMIN — Medication 62.5 MG: at 05:35

## 2024-10-27 RX ADMIN — FLUDROCORTISONE ACETATE 0.1 MG: 0.1 TABLET ORAL at 08:50

## 2024-10-27 RX ADMIN — SPIRONOLACTONE 12.5 MG: 25 TABLET, FILM COATED ORAL at 08:50

## 2024-10-27 RX ADMIN — LOSARTAN POTASSIUM 50 MG: 50 TABLET, FILM COATED ORAL at 19:40

## 2024-10-27 RX ADMIN — AMLODIPINE BESYLATE 2.5 MG: 2.5 TABLET ORAL at 12:24

## 2024-10-27 RX ADMIN — Medication 1 TABLET: at 08:50

## 2024-10-27 RX ADMIN — Medication 62.5 MG: at 18:39

## 2024-10-27 RX ADMIN — Medication 62.5 MG: at 12:23

## 2024-10-27 RX ADMIN — ATORVASTATIN CALCIUM 80 MG: 80 TABLET, FILM COATED ORAL at 19:40

## 2024-10-27 RX ADMIN — WARFARIN SODIUM 2.5 MG: 2.5 TABLET ORAL at 18:39

## 2024-10-27 RX ADMIN — MAGNESIUM OXIDE TAB 400 MG (241.3 MG ELEMENTAL MG) 400 MG: 400 (241.3 MG) TAB at 08:51

## 2024-10-27 RX ADMIN — Medication 62.5 MG: at 23:34

## 2024-10-27 RX ADMIN — AMIODARONE HYDROCHLORIDE 200 MG: 200 TABLET ORAL at 08:50

## 2024-10-27 NOTE — PROGRESS NOTES
[facility]   Cardiology II Service / Advanced Heart Failure  Daily Progress Note      Patient: Duane C Johnson  MRN: [id number]  Admission Date: 10/23/2024  Hospital Day # 4    Assessment and Plan: Duane C Johnson is a 74 year old male who presents from LVAD clinic with hypotension, dizziness, and new right arm weakness. Doppler MAP in clinic ranging 45-48.      In reviewing the TCU chart, MAPS have been ranging from 60-84 with well controled HRs in the 50s-60s. For afterload his hydralazine was decreased yesterday from 100 mg q8h to 75 mg every 8 hours. He is still getting losartan 50 mg BID, although one dose was held yesterday. Last low flow alarm 10/19. He had several prolonged low flow alarms on 10/17 for which hydralazine was increased. CT-A was done on 10/17/24 with no outflow graft ostruction. The outflow graft does make an acute angle as it approaches the aorta.    Today's Plan:  - Hydralazine increased to 62.5 mg  q6h yesterday  - Adding amlodipine 2.5 mg daily  - Will consider cardioversion as well given ongoing a. fib  - Encouraged OOB and walking with staff   - Encouraged increased fluid intake- goal of 7, 16 oz glassess of water, with at least 2 of these glasses in the hour prior to bedtime    # Acute on chronic systolic heart failure secondary to ICM   # s/p HM3 LVAD as DT on 9/18/2024  # CAD s/p PCI  # History of STEMI  # s/p ICD 5/2024  # Acute Angle of Outflow Graft  Stage D. NYHA Class III.    Ra 6, PA 40/14/20, PCW 9, Goldy CO/CI 4.54/2.59    Fluid status: euvolemic   ACEi/ARB/ARNi:   losartan 50 BID  Vasodilator:  - increase hydralazine to 62.5 mg q6h given ongoing hypertension. Had significant hypotension on 75 mg TID.  - adding amlodipine 2.5 mg daily  BB: Recent LVAD, will defer to outpatient  Aldosterone antagonist deferred while other medical therapy is prioritized  SGLT2i: hold Empagliflozin 10 mg d/t propensity for hypovolemia  SCD prophylaxis: ICD  MAP: goal 65-85, elevated    LDH trends: 344, increasing, recheck tomorrow  Anticoagulation: warfarin dosing per pharmacy, INR goal 2-3, today 2.47  Antiplatelet: ASA not indicated in LVAD population, > 6 months s/p STEMI     # Low flow alarms  # Elevated PIs  # Labile MAPs   # Suspected orthostatic hypotension   Speed optimization echo performed 10/1, speed decreased to 5100. Low flows seems releated to hypertension and some hypovolemia as well. Have improved with re-timing hydralazine and after IV fluids. CT-A was done on  10/17/24 with no outflow graft ostruction. The outflow graft does make an acute angle as it approaches the aorta, discussed with Dr. Valentine, unlikely to be impacting the low flows at this time.  Having daily low flow alarms, always in the setting of htn. Mostly ~5 am, although has had some in the evening as well. Has been asymptomatic.  - Losartan 50 mg BID  - Hydralazine 62.5 mg q6h  - Adding amlodipine 2.5 mg daily  - Fludrocortisone 0.1 mg daily   - Increase PO intake as above, specifically right before bed  - Will consider cardioversion as well  - Will discuss a low-flow controller with the lvad coordinators tomorrow     # History of VT/VF arrest 2023  # AFib s/p DCCV 9/2024 and again 9/30/2024  - Successfully cardioverted in early September for AF. Since then EKG suggested AF on 9/21. Tele is only available from as early as 9/22. The patient was started on hep gtt 9/21, but unclear if was in AF before this. While the patient was on hep gtt until INR was therapeutic, it is not possible to assess rhythm prior to 9/21. Systemic AC was off on 9/18-9/20. S/p MELBA and DCCV on 9/30 with conversion to sinus rhythm.  10/23 device interrogation shows persistent episode of AF since 10/10 with rates   - Continue amiodarone 200 mg daily  - Ongoing a fib- given ongoing low flows- will consider cardioversion as well   - Continue AC as above     # Visual changes, resolved.   # Generalized weakness  # Headache  Stoke code called  "9/29 for visual changes - right eye with decreased upper half of vision and bluish haze. Resolved after 30 minutes. Seen by opthalmology and neuro. Negative head CT and CTA head and neck. He has had 3 episodes which last about 5 minutes before resolving completely.      On 10/9 Stroke code activated due to concern of generalized weakness, numbness and headache. LKW reported as 0715 when he worked with therapies and shortly after started feeling generally weak. Then around 1500 he had onset of moderate \"tension\" type headache and tingling in the bilateral fingertips. Per RRT URIAH patient now reports tingling has resolved and headache is improving. MAP 48, INR 2.2. Given absence of focal deficits and rapidly improving symptoms, opted to cancel stroke.  CT head negative, though did show chronic maxillary sinusitis.      Stroke code called 10/23 when seen in clinic with report of new RUE weakness. He was notably hypotensive at this time with MAP ~45 with new right pronator drift. Evaluated in ED, CT and CTA head and neck negative. RUE weakness resolved.   - Already on A/C  - Continue to monitor for changes- no current symptoms     # Hypovolemia hyponatremia  Na 129, ranging 128-133.   - encourage PO intake  - daily BMP  - hold empagliflozin 10 mg daily     # Blood culture postivie for staph epi. 1/2 bottles. No fevers. No symptoms  - Got one dose of Iv vanco, stopped given likely contaminent  - Monitor for further symptoms  - Repeat blood culture pending from 10/25 [facility], continue to follow    FEN: Combination diet  PROPHY:  warfarin  LINES:  PIV  DISPO:  2-3 days  CODE STATUS:  Full Code      Time/Communication  I spent 40 minutes reviewing results, care team notes, meeting directly with the patient, coordinating care, and completing documentation on the day of service.     Patient discussed with Dr. Anthony Forrest PARUPAL  Advanced Heart Failure/Cardiology II Service  Vocera Preferred or Pager " "[phone]  ================================================================    Subjective/24-Hr Events:   Last 24 hr care team notes reviewed. Several low flows this yesterday at 6pm and today at 6am that occurred with elevated MAPs. Asymptomatic at time of low flows. Breathing comfortable. Denies lightheadedness/dizziness. Did go for any walks yesterday, but really wanting to go today. No infectious symptoms. No pain. Denies HA, numbness, weakness. No stroke symptoms.  No s/s of bleeding.  No Le edema or abdominal edema. No driveline concerns.    ROS:  4 point ROS including respiratory, CV, GI and  (other than that noted in the HPI) is negative.     Medications: Reviewed in EPIC.     Physical Exam:   BP (!) 80/65   Pulse 104   Temp 98  F (36.7  C) (Oral)   Resp 18   Ht 1.702 m (5' 7\")   Wt 64.1 kg (141 lb 6.4 oz)   SpO2 99%   BMI 22.15 kg/m      GENERAL: Appears comfortable, in no distress.  HEENT: Eye symmetrical, no discharge or icterus bilaterally.   NECK: Supple, JVD at clavicle at 90 degrees.   CV: tachycardic, irregular, +LVAD hum   RESPIRATORY: Respirations regular, even, and unlabored. Lungs CTA throughout.    GI: Soft and non distended. No tenderness. Normal bowel sounds present  EXTREMITIES: No peripheral edema. All extremities are warm and well perfused  NEUROLOGIC: Alert and interacting appropriatly. No focal deficits.   MUSCULOSKELETAL: No joint swelling or tenderness.   SKIN: No jaundice. No rashes or lesions.     Labs:  CMP  Recent Labs   Lab 10/27/24  0628 10/26/24  0842 10/25/24  1320 10/24/24  2130 10/23/24  2233 10/23/24  1137 10/23/24  0927 10/23/24  0926   * 128* 127* 130*   < >  --   --  129*  132*   POTASSIUM 4.1 4.0 4.2 4.8   < >  --   --  3.8  4.5   CHLORIDE 96* 96* 96* 97*   < >  --   --  94*   CO2 23 24 24 24   < >  --   --  23   ANIONGAP 10 8 7 9   < >  --   --  12   GLC 91 105* 134* 113*   < >  --   --  97  108*   BUN 19.5 16.5 20.1 18.9   < >  --   --  24.4*   CR " 0.61* 0.61* 0.62* 0.73   < >  --    < > 0.77   GFRESTIMATED >90 >90 >90 >90   < >  --    < > >90   PRANAV 8.5* 8.8 8.4* 8.5*   < >  --   --  8.6*   MAG  --   --   --   --   --  1.8  --  1.9   PROTTOTAL 6.1* 6.2* 6.0* 6.0*  --   --   --   --    ALBUMIN 3.0* 3.0* 2.9* 3.0*  --   --   --   --    BILITOTAL 0.4 0.5 0.4 0.3  --   --   --   --    ALKPHOS 116 114 110 104  --   --   --   --    AST 49* 37 38 41  --   --   --   --    ALT 41 40 37 43  --   --   --   --     < > = values in this interval not displayed.       CBC  Recent Labs   Lab 10/26/24  0628 10/25/24  1320 10/24/24  2130 10/23/24  0926   WBC 5.8  5.8 6.0 5.8 8.2   RBC 3.26*  3.26* 3.26* 3.22* 3.70*   HGB 9.9*  9.9* 10.1* 9.9* 11.4*  11.6*   HCT 31.1*  31.1* 31.3* 31.3* 35.5*  34*   MCV 95  95 96 97 96   MCH 30.4  30.4 31.0 30.7 30.8   MCHC 31.8  31.8 32.3 31.6 32.1   RDW 16.4*  16.4* 16.8* 16.9* 17.2*     189 185 199 206       INR  Recent Labs   Lab 10/27/24  0628 10/26/24  0628 10/25/24  0603 10/24/24  0534   INR 2.47* 2.11* 2.29* 2.42*

## 2024-10-27 NOTE — PROGRESS NOTES
The patient's HeartMate LVAD was interrogated 10/27/2024  * Speed 5100 rpm   * Pulsatility index 5.1   * Power 3.3 Dahl   * Flow 3.4 L/minute   Fluid status: hypovolemic  Alarms were reviewed, and notable for 2 low flow alarms at 6 am this morning and many low atilio alarms at 6 pm last night  .   The driveline exit site was inspected, c/d/i.   All external components were inspected and showed no evidence of damage or malfunction, none replaced.   No changes to VAD settings made

## 2024-10-27 NOTE — PLAN OF CARE
Goal Outcome Evaluation:      Plan of Care Reviewed With: patient    Overall Patient Progress: no changeOverall Patient Progress: no change         D:Pt admitted from LVAD clinic with hypotension, dizziness, and new right arm weakness .    A/I:Pt has been in afib with rates in the . Pt's BP MAP's elevated around 6 pm to 110. Pt having low flow alarms at the same time besides drinking 6 glasses of water with the last 3 with in the 1-2 hours prior to 6 pm. Pt given hydralazine  and MAP improved and low flow alarms stopped. Pt is alert and oriented. Pt denies any c/o pain. Pt's lungs clear and pt sating well on RA. Pt's appetite poor. Pt did not eat dinner but did drink 2 ensure this shift. Pt had large BM and voiding clear yellow urine. Pt's drive line dressing changed, site CDI no redness or drainage.Pt has 2 PIV sl. Anticipate discharge to TCU when medically ready.  P: Continue to assure pt is drinking 7 glasses of water a day with last 2 closer to bedtime. Continue monitoring and assessments and POC.

## 2024-10-27 NOTE — PLAN OF CARE
A-fib on tele with rates 70's-100's. Doppler MAPs 80's-90's overnight. Afebrile. Room air. LVAD with only one low flow alarm that quickly resolved. LVAD dressing c/d/I. Alert and oriented x 4. Denies pain. Tolerating diet with no n/v. Adequate UOP per urinal. Had one BM overnight. Bi-lat PIV's saline locked. Repositioning self in bed. OOB with SBA/assist x 1 and walker. Continue to monitor.

## 2024-10-28 ENCOUNTER — APPOINTMENT (OUTPATIENT)
Dept: OCCUPATIONAL THERAPY | Facility: CLINIC | Age: 74
DRG: 286 | End: 2024-10-28
Payer: MEDICARE

## 2024-10-28 LAB
ALBUMIN SERPL BCG-MCNC: 3.1 G/DL (ref 3.5–5.2)
ALP SERPL-CCNC: 126 U/L (ref 40–150)
ALT SERPL W P-5'-P-CCNC: 47 U/L (ref 0–70)
ANION GAP SERPL CALCULATED.3IONS-SCNC: 9 MMOL/L (ref 7–15)
AST SERPL W P-5'-P-CCNC: 42 U/L (ref 0–45)
BILIRUB SERPL-MCNC: 0.4 MG/DL
BUN SERPL-MCNC: 21.4 MG/DL (ref 8–23)
CALCIUM SERPL-MCNC: 8.6 MG/DL (ref 8.8–10.4)
CHLORIDE SERPL-SCNC: 96 MMOL/L (ref 98–107)
CREAT SERPL-MCNC: 0.64 MG/DL (ref 0.67–1.17)
EGFRCR SERPLBLD CKD-EPI 2021: >90 ML/MIN/1.73M2
ERYTHROCYTE [DISTWIDTH] IN BLOOD BY AUTOMATED COUNT: 16.6 % (ref 10–15)
GLUCOSE SERPL-MCNC: 86 MG/DL (ref 70–99)
HCO3 SERPL-SCNC: 25 MMOL/L (ref 22–29)
HCT VFR BLD AUTO: 32.4 % (ref 40–53)
HGB BLD-MCNC: 10.5 G/DL (ref 13.3–17.7)
INR PPP: 2.38 (ref 0.85–1.15)
LDH SERPL L TO P-CCNC: 248 U/L (ref 0–250)
MCH RBC QN AUTO: 31 PG (ref 26.5–33)
MCHC RBC AUTO-ENTMCNC: 32.4 G/DL (ref 31.5–36.5)
MCV RBC AUTO: 96 FL (ref 78–100)
PLATELET # BLD AUTO: 190 10E3/UL (ref 150–450)
POTASSIUM SERPL-SCNC: 4.3 MMOL/L (ref 3.4–5.3)
PROT SERPL-MCNC: 6.3 G/DL (ref 6.4–8.3)
RBC # BLD AUTO: 3.39 10E6/UL (ref 4.4–5.9)
SODIUM SERPL-SCNC: 130 MMOL/L (ref 135–145)
WBC # BLD AUTO: 6.1 10E3/UL (ref 4–11)

## 2024-10-28 PROCEDURE — 250N000013 HC RX MED GY IP 250 OP 250 PS 637: Performed by: STUDENT IN AN ORGANIZED HEALTH CARE EDUCATION/TRAINING PROGRAM

## 2024-10-28 PROCEDURE — 80053 COMPREHEN METABOLIC PANEL: CPT | Performed by: PHYSICIAN ASSISTANT

## 2024-10-28 PROCEDURE — 83615 LACTATE (LD) (LDH) ENZYME: CPT | Performed by: PHYSICIAN ASSISTANT

## 2024-10-28 PROCEDURE — 82040 ASSAY OF SERUM ALBUMIN: CPT | Performed by: PHYSICIAN ASSISTANT

## 2024-10-28 PROCEDURE — 85610 PROTHROMBIN TIME: CPT | Performed by: NURSE PRACTITIONER

## 2024-10-28 PROCEDURE — 99233 SBSQ HOSP IP/OBS HIGH 50: CPT | Mod: 25 | Performed by: PHYSICIAN ASSISTANT

## 2024-10-28 PROCEDURE — 250N000013 HC RX MED GY IP 250 OP 250 PS 637: Performed by: NURSE PRACTITIONER

## 2024-10-28 PROCEDURE — 97530 THERAPEUTIC ACTIVITIES: CPT | Mod: GO

## 2024-10-28 PROCEDURE — 93750 INTERROGATION VAD IN PERSON: CPT | Performed by: PHYSICIAN ASSISTANT

## 2024-10-28 PROCEDURE — 85027 COMPLETE CBC AUTOMATED: CPT | Performed by: NURSE PRACTITIONER

## 2024-10-28 PROCEDURE — 250N000013 HC RX MED GY IP 250 OP 250 PS 637: Performed by: INTERNAL MEDICINE

## 2024-10-28 PROCEDURE — 36415 COLL VENOUS BLD VENIPUNCTURE: CPT | Performed by: PHYSICIAN ASSISTANT

## 2024-10-28 PROCEDURE — 250N000013 HC RX MED GY IP 250 OP 250 PS 637: Performed by: PHYSICIAN ASSISTANT

## 2024-10-28 PROCEDURE — 250N000011 HC RX IP 250 OP 636

## 2024-10-28 PROCEDURE — 120N000003 HC R&B IMCU UMMC

## 2024-10-28 PROCEDURE — 250N000011 HC RX IP 250 OP 636: Performed by: PHYSICIAN ASSISTANT

## 2024-10-28 RX ORDER — HYDRALAZINE HYDROCHLORIDE 20 MG/ML
5 INJECTION INTRAMUSCULAR; INTRAVENOUS ONCE
Status: COMPLETED | OUTPATIENT
Start: 2024-10-28 | End: 2024-10-28

## 2024-10-28 RX ORDER — WARFARIN SODIUM 2.5 MG/1
2.5 TABLET ORAL
Status: COMPLETED | OUTPATIENT
Start: 2024-10-28 | End: 2024-10-28

## 2024-10-28 RX ADMIN — Medication 62.5 MG: at 18:05

## 2024-10-28 RX ADMIN — GABAPENTIN 100 MG: 100 CAPSULE ORAL at 22:17

## 2024-10-28 RX ADMIN — Medication 1 TABLET: at 08:11

## 2024-10-28 RX ADMIN — ATORVASTATIN CALCIUM 80 MG: 80 TABLET, FILM COATED ORAL at 20:00

## 2024-10-28 RX ADMIN — HYDRALAZINE HYDROCHLORIDE 5 MG: 20 INJECTION INTRAMUSCULAR; INTRAVENOUS at 10:28

## 2024-10-28 RX ADMIN — WARFARIN SODIUM 2.5 MG: 2.5 TABLET ORAL at 18:05

## 2024-10-28 RX ADMIN — FLUDROCORTISONE ACETATE 0.1 MG: 0.1 TABLET ORAL at 08:11

## 2024-10-28 RX ADMIN — LOSARTAN POTASSIUM 50 MG: 50 TABLET, FILM COATED ORAL at 08:11

## 2024-10-28 RX ADMIN — Medication 62.5 MG: at 05:33

## 2024-10-28 RX ADMIN — AMIODARONE HYDROCHLORIDE 200 MG: 200 TABLET ORAL at 08:11

## 2024-10-28 RX ADMIN — Medication 62.5 MG: at 11:44

## 2024-10-28 RX ADMIN — HYDRALAZINE HYDROCHLORIDE 5 MG: 20 INJECTION INTRAMUSCULAR; INTRAVENOUS at 22:18

## 2024-10-28 RX ADMIN — Medication 62.5 MG: at 23:54

## 2024-10-28 RX ADMIN — MAGNESIUM OXIDE TAB 400 MG (241.3 MG ELEMENTAL MG) 400 MG: 400 (241.3 MG) TAB at 08:11

## 2024-10-28 RX ADMIN — ESCITALOPRAM OXALATE 10 MG: 10 TABLET ORAL at 08:10

## 2024-10-28 RX ADMIN — LOSARTAN POTASSIUM 50 MG: 50 TABLET, FILM COATED ORAL at 20:00

## 2024-10-28 RX ADMIN — AMLODIPINE BESYLATE 2.5 MG: 2.5 TABLET ORAL at 08:11

## 2024-10-28 RX ADMIN — SPIRONOLACTONE 12.5 MG: 25 TABLET, FILM COATED ORAL at 08:10

## 2024-10-28 NOTE — PROGRESS NOTES
[facility]   Cardiology II Service / Advanced Heart Failure  Daily Progress Note      Patient: Duane C Johnson  MRN: [id number]  Admission Date: 10/23/2024  Hospital Day # 5    Assessment and Plan: Duane C Johnson is a 74 year old male who presents from LVAD clinic with hypotension, dizziness, and new right arm weakness. Doppler MAP in clinic ranging 45-48.      In reviewing the TCU chart, MAPS have been ranging from 60-84 with well controled HRs in the 50s-60s. For afterload his hydralazine was decreased yesterday from 100 mg q8h to 75 mg every 8 hours. He is still getting losartan 50 mg BID, although one dose was held yesterday. Last low flow alarm 10/19. He had several prolonged low flow alarms on 10/17 for which hydralazine was increased. CT-A was done on 10/17/24 with no outflow graft ostruction. The outflow graft does make an acute angle as it approaches the aorta.    Today's Plan:  - HOLD florinef  - Hydralazine 62.5 mg  q6h yesterday  - Adding amlodipine 2.5 mg daily  - Deferring cardioversion or other a. Fib intervention for now as thought to be interacting minimally given he has good weight control. Will discuss adding digoxin  - Encouraged OOB and walking with staff   - Encouraged increased fluid intake- goal of 7, 16 oz glassess of water, with at least 2 of these glasses in the hour prior to bedtime  - If all these things are not successful, we are considering perusing low-flow controller     # Acute on chronic systolic heart failure secondary to ICM   # s/p HM3 LVAD as DT on 9/18/2024  # CAD s/p PCI  # History of STEMI  # s/p ICD 5/2024  # Acute Angle of Outflow Graft  Stage D. NYHA Class III.    10/23/24 Ra 6, PA 40/14/20, PCW 9, Goldy CO/CI 4.54/2.59    Fluid status: euvolemic   ACEi/ARB/ARNi:  losartan 50 BID  Vasodilator:  - increased hydralazine to 62.5 mg q6h given ongoing hypertension. Had significant hypotension on 75 mg TID.  - added amlodipine 2.5 mg daily  BB: Recent LVAD, will  defer to outpatient  Aldosterone antagonist avoid for now given propensity for hypovolemia  SGLT2i: STOPPED  Empagliflozin 10 mg d/t propensity for hypovolemia  SCD prophylaxis: ICD  MAP: goal 65-85, elevated   LDH trends: 248, improved from yesterday  Anticoagulation: warfarin dosing per pharmacy, INR goal 2-3, today 2.38  Antiplatelet: ASA not indicated in LVAD population, > 6 months s/p STEMI     # Low flow alarms  # Elevated PIs  # Labile MAPs   # Suspected orthostatic hypotension   Speed optimization echo performed 10/1, speed decreased to 5100. Low flows seems releated to hypertension and some hypovolemia as well. Have improved with re-timing hydralazine and after IV fluids. CT-A was done on  10/17/24 with no outflow graft ostruction. The outflow graft does make an acute angle as it approaches the aorta, discussed with Dr. Valentine, unlikely to be impacting the low flows at this time.  Having daily low flow alarms, always in the setting of htn. Mostly ~5 am, although has had some in the evening as well. They do not seem to be positional. RHC with reasonable filling pressures as above. Has been asymptomatic.  - Decreased speed to 5000 on 10/27  - Losartan 50 mg BID  - Hydralazine 62.5 mg q6h- he feels he can do this at home, but ideally will transition back to every every 8 hours prior to discharge   - Adding amlodipine 2.5 mg daily  - HOLDING Fludrocortisone 0.1 mg daily   - Increase PO intake as above, specifically right before bed  - If all these things are not successful, we are considering perusing low-flow controller vs adjusting hematocrit on vad setting     # History of VT/VF arrest 2023  # AFib s/p DCCV 9/2024 and again 9/30/2024 Successfully cardioverted in early September for AF. Since then EKG suggested AF on 9/21. Tele is only available from as early as 9/22. The patient was started on hep gtt 9/21, but unclear if was in AF before this. While the patient was on hep gtt until INR was therapeutic, it is  "not possible to assess rhythm prior to 9/21. Systemic AC was off on 9/18-9/20. S/p MELBA and DCCV on 9/30 with conversion to sinus rhythm.  10/23 device interrogation shows persistent episode of AF since 10/10 with rates   - Continue amiodarone 200 mg daily  - Ongoing a fib- given ongoing low flows- deferring cardioversion for now given reasonable rate control. Will discuss adding digoxin as well  - Continue AC as above     # Visual changes, resolved.   # Generalized weakness  # Headache  Stoke code called 9/29 for visual changes - right eye with decreased upper half of vision and bluish haze. Resolved after 30 minutes. Seen by opthalmology and neuro. Negative head CT and CTA head and neck. He has had 3 episodes which last about 5 minutes before resolving completely.      On 10/9 Stroke code activated due to concern of generalized weakness, numbness and headache. LKW reported as 0715 when he worked with therapies and shortly after started feeling generally weak. Then around 1500 he had onset of moderate \"tension\" type headache and tingling in the bilateral fingertips. Per RRT URIAH patient now reports tingling has resolved and headache is improving. MAP 48, INR 2.2. Given absence of focal deficits and rapidly improving symptoms, opted to cancel stroke.  CT head negative, though did show chronic maxillary sinusitis.      Stroke code called 10/23 when seen in clinic with report of new RUE weakness. He was notably hypotensive at this time with MAP ~45 with new right pronator drift. Evaluated in ED, CT and CTA head and neck negative. RUE weakness resolved.   - Already on A/C  - Continue to monitor for changes- no current symptoms     # Hypovolemia hyponatremia  Na 130, ranging 128-133.   - encourage PO intake  - daily BMP    # Blood culture postivie for staph epi. 1/2 bottles. No fevers. No symptoms  - Got one dose of Iv vanco, stopped given likely contaminent  - Monitor for further symptoms  - Repeat blood culture " "pending from 10/25 [facility], continue to follow    FEN: Combination diet  PROPHY:  warfarin  LINES:  PIV  DISPO:  2-3 days  CODE STATUS:  Full Code      Time/Communication  I spent 55 minutes reviewing results, care team notes, meeting directly with the patient, coordinating care, and completing documentation on the day of service.     Patient discussed with Dr. Anthony Forrest PA-C  Advanced Heart Failure/Cardiology II Service  Vocera Preferred or Pager [phone]  ================================================================    Subjective/24-Hr Events:   Last 24 hr care team notes reviewed.3 low flows today, prior to that had not had any in 24 hours.  Asymptomatic at time of low flows. Do not seem to be positional. Breathing comfortable. Denies lightheadedness/dizziness. Felt very good walking yesterday. No infectious symptoms. No pain. Denies HA, numbness, weakness. No stroke symptoms.  No s/s of bleeding.  No Le edema or abdominal edema. No driveline concerns.    ROS:  4 point ROS including respiratory, CV, GI and  (other than that noted in the HPI) is negative.     Medications: Reviewed in EPIC.     Physical Exam:   BP (!) 80/65   Pulse 113   Temp 98.1  F (36.7  C) (Oral)   Resp 20   Ht 1.702 m (5' 7\")   Wt 62.8 kg (138 lb 7.2 oz)   SpO2 98%   BMI 21.68 kg/m      GENERAL: Appears comfortable, in no distress.  HEENT: Eye symmetrical, no discharge or icterus bilaterally.   NECK: Supple, JVD at clavicle at 90 degrees.   CV: tachycardic, irregular, +LVAD hum   RESPIRATORY: Respirations regular, even, and unlabored. Lungs CTA throughout.    GI: Soft and non distended. No tenderness. Normal bowel sounds present  EXTREMITIES: No peripheral edema. All extremities are warm and well perfused  NEUROLOGIC: Alert and interacting appropriatly. No focal deficits.   MUSCULOSKELETAL: No joint swelling or tenderness.   SKIN: No jaundice. No rashes or lesions.     Labs:  [facility]  Recent Labs   Lab " 10/28/24  0529 10/27/24  0628 10/26/24  0842 10/25/24  1320 10/23/24  2233 10/23/24  1137 10/23/24  0927 10/23/24  0926   * 129* 128* 127*   < >  --   --  129*  132*   POTASSIUM 4.3 4.1 4.0 4.2   < >  --   --  3.8  4.5   CHLORIDE 96* 96* 96* 96*   < >  --   --  94*   CO2 25 23 24 24   < >  --   --  23   ANIONGAP 9 10 8 7   < >  --   --  12   GLC 86 91 105* 134*   < >  --   --  97  108*   BUN 21.4 19.5 16.5 20.1   < >  --   --  24.4*   CR 0.64* 0.61* 0.61* 0.62*   < >  --    < > 0.77   GFRESTIMATED >90 >90 >90 >90   < >  --    < > >90   PRANAV 8.6* 8.5* 8.8 8.4*   < >  --   --  8.6*   MAG  --   --   --   --   --  1.8  --  1.9   PROTTOTAL 6.3* 6.1* 6.2* 6.0*   < >  --   --   --    ALBUMIN 3.1* 3.0* 3.0* 2.9*   < >  --   --   --    BILITOTAL 0.4 0.4 0.5 0.4   < >  --   --   --    ALKPHOS 126 116 114 110   < >  --   --   --    AST 42 49* 37 38   < >  --   --   --    ALT 47 41 40 37   < >  --   --   --     < > = values in this interval not displayed.       CBC  Recent Labs   Lab 10/28/24  0529 10/26/24  0628 10/25/24  1320 10/24/24  2130   WBC 6.1 5.8  5.8 6.0 5.8   RBC 3.39* 3.26*  3.26* 3.26* 3.22*   HGB 10.5* 9.9*  9.9* 10.1* 9.9*   HCT 32.4* 31.1*  31.1* 31.3* 31.3*   MCV 96 95  95 96 97   MCH 31.0 30.4  30.4 31.0 30.7   MCHC 32.4 31.8  31.8 32.3 31.6   RDW 16.6* 16.4*  16.4* 16.8* 16.9*    189  189 185 199       INR  Recent Labs   Lab 10/28/24  0529 10/27/24  0628 10/26/24  0628 10/25/24  0603   INR 2.38* 2.47* 2.11* 2.29*

## 2024-10-28 NOTE — PROGRESS NOTES
General Appearance: VSS A+O MAPS   Respiratory: Lungs CTA on room air  Cardiovascular: LVAD Hum heard. Atrial fibrillation. Low flow alarms this am. IV hydralazine given and no further low flow alarms.   GI: Eatiing well with a large formed BM today  Skin: No new lesions or bruises. Drive line drsg changed and is CDI  Other:Wife visited today. Walked with OT/PT

## 2024-10-28 NOTE — PLAN OF CARE
Goal Outcome Evaluation:      Plan of Care Reviewed With: patient    Overall Patient Progress: improvingOverall Patient Progress: improving     D:Pt admitted for low flow alarms, dizziness.    A/I: Pt has been alert and oriented,denies any c/o pain. Pt has been in afib with stable rate and MAP's . Pt's :VAD speed decreased to 5000 with flow maintaining in the 3's along with ramey in the 3's and PI in the 5-6 range. Pt has not had any low flow alarms. Pt drinking the 6-7 glasses of water in addition to his supplement drinks. Pt's LVAD dressing CDI. Pt's lungs clear. Pt had BM.Amlodipine added to med.    P: Continue with POC and monitoring     ADD; No plans for cardioversion per team

## 2024-10-28 NOTE — PROGRESS NOTES
D: Stopped by to see patient. No VAD related questions or concerns at this time.   I: Discussed POC and provided support and listened to patient thoughts and concerns.  P: Continue to follow patient and address any questions or concerns patient and or caregiver may have.      Indication of Interrogation:  VAD alarms    Type of VAD:  Heartmate 3    Current Parameters:  Flow= 2.7 (2.3-3.1) lpm, Speed= 5000 rpm, Power= 3.2 jackson, PI (if applicable)= 8.4 (7.4-10.2)    Abnormal Alarm on History:  Yes, explain: 29 Low flow alarms between 10/28 9:09AM-10:35AM  Flows 2.3-2.4, PIs elevated as well 9.2-10.2 when low flow alarms.    Abnormal Events/Parameters Notes:  Yes, explain: Patient's  when patient low flowing    Changes Made during Interrogation:  No, bedside nurse had just given IV hydralazine. Low flow last alarm at 10:35AM

## 2024-10-28 NOTE — PLAN OF CARE
Goal Outcome Evaluation:  Patient slated to be discharged to home in the next 1-2 days

## 2024-10-28 NOTE — PLAN OF CARE
A-fib on tele with rates 70's-100's. Doppler MAPs in the 90's overnight. Afebrile. Room air. LVAD with no alarms overnight. All numbers within parameters. LVAD dressing c/d/I. Alert and oriented x 4. Denies pain. Tolerating diet with no n/v. Adequate UOP per urinal. No BM overnight. OOB with SBA and walker. Continue to monitor.

## 2024-10-28 NOTE — PROGRESS NOTES
The patient's HeartMate LVAD was interrogated 10/28/2024  * Speed 5000 rpm   * Pulsatility index 7.0   * Power 3.3 Dahl   * Flow 2.9 L/minute   Fluid status: hypovolemic to euvolemic   Alarms were reviewed, and notable for 6 low flow alarms in the last hour (more than the 3 he reported to me), high pis with these.   The driveline exit site was inspected, c/d/i.   All external components were inspected and showed no evidence of damage or malfunction, none replaced.   No changes to VAD settings made

## 2024-10-29 ENCOUNTER — APPOINTMENT (OUTPATIENT)
Dept: OCCUPATIONAL THERAPY | Facility: CLINIC | Age: 74
DRG: 286 | End: 2024-10-29
Payer: MEDICARE

## 2024-10-29 ENCOUNTER — APPOINTMENT (OUTPATIENT)
Dept: PHYSICAL THERAPY | Facility: CLINIC | Age: 74
DRG: 286 | End: 2024-10-29
Payer: MEDICARE

## 2024-10-29 LAB
ALBUMIN SERPL BCG-MCNC: 3 G/DL (ref 3.5–5.2)
ALP SERPL-CCNC: 117 U/L (ref 40–150)
ALT SERPL W P-5'-P-CCNC: 41 U/L (ref 0–70)
ANION GAP SERPL CALCULATED.3IONS-SCNC: 9 MMOL/L (ref 7–15)
AST SERPL W P-5'-P-CCNC: 43 U/L (ref 0–45)
BACTERIA BLD CULT: NO GROWTH
BILIRUB SERPL-MCNC: 0.4 MG/DL
BUN SERPL-MCNC: 15.4 MG/DL (ref 8–23)
CALCIUM SERPL-MCNC: 8.6 MG/DL (ref 8.8–10.4)
CHLORIDE SERPL-SCNC: 92 MMOL/L (ref 98–107)
CREAT SERPL-MCNC: 0.56 MG/DL (ref 0.67–1.17)
EGFRCR SERPLBLD CKD-EPI 2021: >90 ML/MIN/1.73M2
GLUCOSE SERPL-MCNC: 91 MG/DL (ref 70–99)
HCO3 SERPL-SCNC: 22 MMOL/L (ref 22–29)
HOLD SPECIMEN: NORMAL
INR PPP: 2.25 (ref 0.85–1.15)
LDH SERPL L TO P-CCNC: 257 U/L (ref 0–250)
POTASSIUM SERPL-SCNC: 4 MMOL/L (ref 3.4–5.3)
PROT SERPL-MCNC: 6.1 G/DL (ref 6.4–8.3)
SODIUM SERPL-SCNC: 123 MMOL/L (ref 135–145)

## 2024-10-29 PROCEDURE — 82247 BILIRUBIN TOTAL: CPT | Performed by: PHYSICIAN ASSISTANT

## 2024-10-29 PROCEDURE — 120N000003 HC R&B IMCU UMMC

## 2024-10-29 PROCEDURE — 97116 GAIT TRAINING THERAPY: CPT | Mod: GP

## 2024-10-29 PROCEDURE — 82435 ASSAY OF BLOOD CHLORIDE: CPT | Performed by: PHYSICIAN ASSISTANT

## 2024-10-29 PROCEDURE — 83615 LACTATE (LD) (LDH) ENZYME: CPT | Performed by: PHYSICIAN ASSISTANT

## 2024-10-29 PROCEDURE — 250N000013 HC RX MED GY IP 250 OP 250 PS 637: Performed by: NURSE PRACTITIONER

## 2024-10-29 PROCEDURE — 93750 INTERROGATION VAD IN PERSON: CPT | Performed by: NURSE PRACTITIONER

## 2024-10-29 PROCEDURE — 85610 PROTHROMBIN TIME: CPT | Performed by: NURSE PRACTITIONER

## 2024-10-29 PROCEDURE — 97110 THERAPEUTIC EXERCISES: CPT | Mod: GO

## 2024-10-29 PROCEDURE — 250N000013 HC RX MED GY IP 250 OP 250 PS 637: Performed by: PHYSICIAN ASSISTANT

## 2024-10-29 PROCEDURE — 36415 COLL VENOUS BLD VENIPUNCTURE: CPT | Performed by: NURSE PRACTITIONER

## 2024-10-29 PROCEDURE — 82040 ASSAY OF SERUM ALBUMIN: CPT | Performed by: PHYSICIAN ASSISTANT

## 2024-10-29 PROCEDURE — 97110 THERAPEUTIC EXERCISES: CPT | Mod: GP

## 2024-10-29 PROCEDURE — 97530 THERAPEUTIC ACTIVITIES: CPT | Mod: GP

## 2024-10-29 PROCEDURE — 99232 SBSQ HOSP IP/OBS MODERATE 35: CPT | Mod: 25 | Performed by: NURSE PRACTITIONER

## 2024-10-29 PROCEDURE — 97112 NEUROMUSCULAR REEDUCATION: CPT | Mod: GP

## 2024-10-29 PROCEDURE — 250N000013 HC RX MED GY IP 250 OP 250 PS 637: Performed by: STUDENT IN AN ORGANIZED HEALTH CARE EDUCATION/TRAINING PROGRAM

## 2024-10-29 PROCEDURE — 250N000013 HC RX MED GY IP 250 OP 250 PS 637: Performed by: INTERNAL MEDICINE

## 2024-10-29 RX ORDER — WARFARIN SODIUM 2.5 MG/1
2.5 TABLET ORAL
Status: COMPLETED | OUTPATIENT
Start: 2024-10-29 | End: 2024-10-29

## 2024-10-29 RX ORDER — AMLODIPINE BESYLATE 5 MG/1
5 TABLET ORAL DAILY
Status: DISCONTINUED | OUTPATIENT
Start: 2024-10-30 | End: 2024-10-31

## 2024-10-29 RX ADMIN — LOSARTAN POTASSIUM 50 MG: 50 TABLET, FILM COATED ORAL at 20:18

## 2024-10-29 RX ADMIN — ESCITALOPRAM OXALATE 10 MG: 10 TABLET ORAL at 07:52

## 2024-10-29 RX ADMIN — Medication 62.5 MG: at 05:06

## 2024-10-29 RX ADMIN — SPIRONOLACTONE 12.5 MG: 25 TABLET, FILM COATED ORAL at 07:53

## 2024-10-29 RX ADMIN — Medication 62.5 MG: at 18:00

## 2024-10-29 RX ADMIN — Medication 62.5 MG: at 23:55

## 2024-10-29 RX ADMIN — MAGNESIUM OXIDE TAB 400 MG (241.3 MG ELEMENTAL MG) 400 MG: 400 (241.3 MG) TAB at 07:53

## 2024-10-29 RX ADMIN — AMIODARONE HYDROCHLORIDE 200 MG: 200 TABLET ORAL at 07:53

## 2024-10-29 RX ADMIN — Medication 1 TABLET: at 07:52

## 2024-10-29 RX ADMIN — Medication 62.5 MG: at 11:00

## 2024-10-29 RX ADMIN — WARFARIN SODIUM 2.5 MG: 2.5 TABLET ORAL at 18:01

## 2024-10-29 RX ADMIN — AMLODIPINE BESYLATE 2.5 MG: 2.5 TABLET ORAL at 07:52

## 2024-10-29 RX ADMIN — ATORVASTATIN CALCIUM 80 MG: 80 TABLET, FILM COATED ORAL at 20:18

## 2024-10-29 RX ADMIN — LOSARTAN POTASSIUM 50 MG: 50 TABLET, FILM COATED ORAL at 07:52

## 2024-10-29 RX ADMIN — GABAPENTIN 100 MG: 100 CAPSULE ORAL at 22:00

## 2024-10-29 NOTE — PROGRESS NOTES
General Appearance: VSS A+O  Respiratory: Lungs CTA on room air  Cardiovascular: LVAD hum Atrial fibrillation. Some low flow alarms this am. Hydralazine and hematocrit set to 20 and no further alarms   GI: Eating small portions. BM today  Skin: No new lesions or bruises. Drive line CDI  Other: Up walking with OT/PT

## 2024-10-29 NOTE — PROGRESS NOTES
Post-VAD Social Work Services Progress Note    Date of Initial Social Work Evaluation: 09/05/2024   Collaborated with: Patient    Data: Duane received his LVAD on 9/18/24 and transitioned to  ARU and then to TCU. Pt presented to [facility] LVAD clinic with hypotension, dizziness, and new right arm weakness. Stroke code was called to assess symptoms further.     Patient continues to be experiencing low-flow alarms and high blood pressure. Patient continues to not be medically ready for discharge yet.    Intervention: SW met with patient for supportive visit and inquired into questions or concerns. SW assessed coping and discussed discharge planning.    Assessment: Patient sitting upright in bed and discussed amount of low flow alarms he has had. Indicated he has not gotten a whole lot of sleep. Discussed understanding he needs to remain in hospital until low flow alarms, medications, and BP is figured out. Denied questions or concerns for SW at this time.  Education provided by SW: Ongoing SW availability, discharge planning  Plan:    Discharge Plans in Progress: Home with outpatient therapies    Barriers to d/c plan: Medical Readiness    Follow up Plan: SW to continue to follow to support post-VAD psychosocial concerns. RNCC to set up outpatient therapy services.       Jessi Almendarez, MSW, LGSW, APSW  Heart Transplant/[facility]   [facility]  Ph. [phone]

## 2024-10-29 NOTE — PROGRESS NOTES
[facility]   Cardiology II Service / Advanced Heart Failure  Daily Progress Note      Patient: Duane C Johnson  MRN: [id number]  Admission Date: 10/23/2024  Hospital Day # 6    Assessment and Plan: Duane C Johnson is a 74 year old male who presents from LVAD clinic with hypotension, dizziness, and new right arm weakness. Doppler MAP in clinic ranging 45-48.      In reviewing the TCU chart, MAPS have been ranging from 60-84 with well controled HRs in the 50s-60s. For afterload his hydralazine was decreased yesterday from 100 mg q8h to 75 mg every 8 hours. He is still getting losartan 50 mg BID, although one dose was held yesterday. Last low flow alarm 10/19. He had several prolonged low flow alarms on 10/17 for which hydralazine was increased. CT-A was done on 10/17/24 with no outflow graft ostruction. The outflow graft does make an acute angle as it approaches the aorta. The patient's LVAD has had frequent low flow alarms over the course of his hospitalization with elevated MAPs.    Today's Plan:  - HOLD florinef  - Hydralazine 62.5 mg q6h   - increase amlodipine 5 mg daily  - deferring cardioversion or other a. Fib intervention for now as thought to be interacting minimally given he has good weight control. Will discuss adding digoxin  - Encouraged OOB and walking with staff   - Encouraged increased fluid intake- goal of 7, 16 oz glassess of water, with at least 2 of these glasses in the hour prior to bedtime  - If all these things are not successful, we are considering low-flow controller     # Acute on chronic systolic heart failure secondary to ICM   # s/p HM3 LVAD as DT on 9/18/2024  # CAD s/p PCI  # History of STEMI  # s/p ICD 5/2024  # Acute Angle of Outflow Graft  Stage D. NYHA Class III.    10/23/24 Ra 6, PA 40/14/20, PCW 9, Goldy CO/CI 4.54/2.59    Fluid status: euvolemic   ACEi/ARB/ARNi:  losartan 50 BID  Vasodilator:  - Hydralazine 62.5 mg q6h given ongoing hypertension. Had significant  hypotension on 75 mg TID.  - Amlodipine increased to 5 mg daily  BB: Recent LVAD, will defer to outpatient  Aldosterone antagonist avoid for now given propensity for hypovolemia  SGLT2i: STOPPED  Empagliflozin 10 mg d/t propensity for hypovolemia  SCD prophylaxis: ICD  MAP: goal 65-85, elevated   LDH trends: 248, improved from yesterday  Anticoagulation: warfarin dosing per pharmacy, INR goal 2-3, today 2.38  Antiplatelet: ASA not indicated in LVAD population, > 6 months s/p STEMI     # Low flow alarms  # Elevated PIs  # Labile MAPs   # Suspected orthostatic hypotension   Speed optimization echo performed 10/1, speed decreased to 5100. Low flows seems releated to hypertension and some hypovolemia as well. Have improved with re-timing hydralazine and after IV fluids. CT-A was done on  10/17/24 with no outflow graft ostruction. The outflow graft does make an acute angle as it approaches the aorta, discussed with Dr. Valentine, unlikely to be impacting the low flows at this time.  Having daily low flow alarms, always in the setting of htn. Mostly ~5 am, although has had some in the evening as well. They do not seem to be positional. RHC with reasonable filling pressures as above. Has been asymptomatic.  - Decreased speed to 5000 on 10/27  - Losartan 50 mg BID  - Hydralazine 62.5 mg q6h- he feels he can do this at home, but ideally will transition back to every every 8 hours prior to discharge   - increased amlodipine 5 mg daily  - HOLDING fludrocortisone 0.1 mg daily   - Increase PO intake as above, specifically right before bed  - If all these things are not successful, we are considering perusing low-flow controller vs adjusting hematocrit on vad setting     # History of VT/VF arrest 2023  # AFib s/p DCCV 9/2024 and again 9/30/2024 Successfully cardioverted in early September for AF. Since then EKG suggested AF on 9/21. Tele is only available from as early as 9/22. The patient was started on hep gtt 9/21, but unclear if  "was in AF before this. While the patient was on hep gtt until INR was therapeutic, it is not possible to assess rhythm prior to 9/21. Systemic AC was off on 9/18-9/20. S/p MELBA and DCCV on 9/30 with conversion to sinus rhythm.  10/23 device interrogation shows persistent episode of AF since 10/10 with rates   - Continue amiodarone 200 mg daily  - Ongoing a fib- given ongoing low flows- deferring cardioversion for now given reasonable rate control. Will discuss adding digoxin as well  - Continue AC as above     # Visual changes, resolved.   # Generalized weakness  # Headache  Stoke code called 9/29 for visual changes - right eye with decreased upper half of vision and bluish haze. Resolved after 30 minutes. Seen by opthalmology and neuro. Negative head CT and CTA head and neck. He has had 3 episodes which last about 5 minutes before resolving completely.      On 10/9 Stroke code activated due to concern of generalized weakness, numbness and headache. LKW reported as 0715 when he worked with therapies and shortly after started feeling generally weak. Then around 1500 he had onset of moderate \"tension\" type headache and tingling in the bilateral fingertips. Per RRT URIAH patient now reports tingling has resolved and headache is improving. MAP 48, INR 2.2. Given absence of focal deficits and rapidly improving symptoms, opted to cancel stroke.  CT head negative, though did show chronic maxillary sinusitis.      Stroke code called 10/23 when seen in clinic with report of new RUE weakness. He was notably hypotensive at this time with MAP ~45 with new right pronator drift. Evaluated in ED, CT and CTA head and neck negative. RUE weakness resolved.   - Already on A/C  - Continue to monitor for changes- no current symptoms     # Hypovolemia hyponatremia  Na 123   - encourage high sodium PO intake/gatorade  - daily BMP    # Blood culture postivie for staph epi. 1/2 bottles. No fevers. No symptoms  - Got one dose of Iv vanco, " "stopped given likely contaminent  - Monitor for further symptoms  - Repeat blood culture pending from 10/25 NGTD, continue to follow    FEN: Combination diet  PROPHY:  warfarin  LINES:  PIV  DISPO:  2-3 days  CODE STATUS:  Full Code      Time/Communication  I spent 55 minutes reviewing results, care team notes, meeting directly with the patient, coordinating care, and completing documentation on the day of service.     Patient discussed with Dr. Anthony Moreira, DNP, NP-C  Nurse Practitioner - Advanced Heart Failure/Cardiology II Service  Vocera preferred or Pager [phone]    ================================================================    Subjective/24-Hr Events:   Last 24 hr care team notes reviewed. Several low flows today, prior to that had not had any in 24 hours.  Asymptomatic at time of low flows. Do not seem to be positional. Breathing comfortably, feels well. Denies lightheadedness/dizziness. Felt very good walking yesterday. No infectious symptoms. No pain. Denies HA, numbness, weakness.     ROS:  4 point ROS including respiratory, CV, GI and  (other than that noted in the HPI) is negative.     Medications: Reviewed in EPIC.     Physical Exam:   /81   Pulse 102   Temp 97.3  F (36.3  C) (Oral)   Resp 16   Ht 1.702 m (5' 7\")   Wt 63 kg (138 lb 14.4 oz)   SpO2 97%   BMI 21.75 kg/m      GENERAL: Appears comfortable, in no distress.  HEENT: Eye symmetrical, no discharge or icterus bilaterally.   NECK: Supple, JVD at clavicle at 90 degrees.   CV: tachycardic, irregular, +LVAD hum   RESPIRATORY: Respirations regular, even, and unlabored. Lungs CTA throughout.    GI: Soft and non distended. No tenderness. Normal bowel sounds present  EXTREMITIES: No peripheral edema. All extremities are warm and well perfused  NEUROLOGIC: Alert and interacting appropriatly. No focal deficits.   MUSCULOSKELETAL: No joint swelling or tenderness.   SKIN: No jaundice. No rashes or lesions. "     Labs:  CMP  Recent Labs   Lab 10/29/24  0547 10/28/24  0529 10/27/24  0628 10/26/24  0842 10/23/24  2233 10/23/24  1137 10/23/24  0927 10/23/24  0926   * 130* 129* 128*   < >  --   --  129*  132*   POTASSIUM 4.0 4.3 4.1 4.0   < >  --   --  3.8  4.5   CHLORIDE 92* 96* 96* 96*   < >  --   --  94*   CO2 22 25 23 24   < >  --   --  23   ANIONGAP 9 9 10 8   < >  --   --  12   GLC 91 86 91 105*   < >  --   --  97  108*   BUN 15.4 21.4 19.5 16.5   < >  --   --  24.4*   CR 0.56* 0.64* 0.61* 0.61*   < >  --    < > 0.77   GFRESTIMATED >90 >90 >90 >90   < >  --    < > >90   PRANAV 8.6* 8.6* 8.5* 8.8   < >  --   --  8.6*   MAG  --   --   --   --   --  1.8  --  1.9   PROTTOTAL 6.1* 6.3* 6.1* 6.2*   < >  --   --   --    ALBUMIN 3.0* 3.1* 3.0* 3.0*   < >  --   --   --    BILITOTAL 0.4 0.4 0.4 0.5   < >  --   --   --    ALKPHOS 117 126 116 114   < >  --   --   --    AST 43 42 49* 37   < >  --   --   --    ALT 41 47 41 40   < >  --   --   --     < > = values in this interval not displayed.       CBC  Recent Labs   Lab 10/28/24  0529 10/26/24  0628 10/25/24  1320 10/24/24  2130   WBC 6.1 5.8  5.8 6.0 5.8   RBC 3.39* 3.26*  3.26* 3.26* 3.22*   HGB 10.5* 9.9*  9.9* 10.1* 9.9*   HCT 32.4* 31.1*  31.1* 31.3* 31.3*   MCV 96 95  95 96 97   MCH 31.0 30.4  30.4 31.0 30.7   MCHC 32.4 31.8  31.8 32.3 31.6   RDW 16.6* 16.4*  16.4* 16.8* 16.9*    189  189 185 199       INR  Recent Labs   Lab 10/29/24  0547 10/28/24  0529 10/27/24  0628 10/26/24  0628   INR 2.25* 2.38* 2.47* 2.11*

## 2024-10-29 NOTE — PROGRESS NOTES
6507- ROXANE. PT LVAD has had 5 alarms whenever the flow is less than 2.5. Any intervention at this time, please?  Thank you  BLADE Hauser  6C  [phone]  Order received

## 2024-10-29 NOTE — PLAN OF CARE
D: Other specified hypotension  (primary encounter diagnosis)  Transient hypotension  Right arm weakness  LVAD (left ventricular assist device) present (H)    I: Monitored vitals and assessed pt status.   Changed: LVAD set off alarms several time during the shift, asymptomatic, MD paged, got an order to give iv hydralazine x 1, and scheduled hydralazine given as well.  Running: None  PRN: None  Neuro: A&Ox4, able to verbalize needs, and calls appropriately.   Cardiac: Afib, LVAD MAP 86-98, Afebrile, VSS.   Respiratory: Lungs sound are clear, denies dyspnea, no cough observed, Sat>92% on RA  GI/: Active bowel sound, passing flatus, had medium formed BM 10/28, and Voiding spontaneously. No BM this shift.  Diet/appetite: Tolerating easy to chew level 7 diet. Denies nausea.  Activity: Up with assist standby   Pain: Denies   Skin: No new deficits noted.  Lines: Piv x 2, SL'D   I/O this shift:  In: 240 [P.O.:240]  Out: 900 [Urine:900]    Temp:  [97.3  F (36.3  C)-98.1  F (36.7  C)] 97.3  F (36.3  C)  Pulse:  [] 102  Resp:  [16-20] 16  BP: ()/(50-89) 106/81  Cuff Mean (mmHg):  [104] 104  SpO2:  [94 %-98 %] 97 %    Goal Outcome Evaluation:      Plan of Care Reviewed With: patient    Overall Patient Progress: improvingOverall Patient Progress: improving    Outcome Evaluation: Overall Patient is improving    P: Continue to monitor Pt status and report changes to treatment team.

## 2024-10-29 NOTE — PROGRESS NOTES
The patient's HeartMate LVAD was interrogated 10/29/2024  * Speed 5000 rpm   * Pulsatility index 6.7-8.6   * Power 2.4 Dahl   * Flow 2.4-2.7 L/minute   Fluid status: hypovolemic to euvolemic   Alarms were reviewed, and notable for several low flow alarms this AM, clustered, high pis with these.   The driveline exit site was inspected, c/d/i.   All external components were inspected and showed no evidence of damage or malfunction, none replaced.   No changes to VAD settings made

## 2024-10-29 NOTE — PLAN OF CARE
Goal Outcome Evaluation:         Patient continues to have low flow alarms 2/2 hypertension. Meds being adjusted

## 2024-10-29 NOTE — PROGRESS NOTES
Care Management Follow Up    Length of Stay (days): 6    Expected Discharge Date: 10/30/2024     Concerns to be Addressed: basic needs     Patient plan of care discussed at interdisciplinary rounds: Yes    Anticipated Discharge Disposition: Home, Home Care              Anticipated Discharge Services:    Anticipated Discharge DME: None    Patient/family educated on Medicare website which has current facility and service quality ratings: no  Education Provided on the Discharge Plan: Yes  Patient/Family in Agreement with the Plan: yes    Referrals Placed by CM/SW: Internal Clinic Care Coordination, Homecare  Private pay costs discussed: Not applicable    Discussed  Partnership in Safe Discharge Planning  document with patient/family: No     Handoff Completed: No, handoff not indicated or clinically appropriate    Additional Information:  Patient remains hospitalized, LVAD, getting workup for hypertension, not medically ready for discharge yet.     Patient with HC set up for PT/OT/RN, [facility].    INR to be scheduled at [facility] prior to discharge.     RNCC continues to follow.    Next Steps: Update HC agency prior to discharge, ensure orders placed for HC PT/OT/RN. Sched INR at [facility] for after hospital stay.    [facility]  Phone: [phone]  Fax: [phone]    SANDIE DelgadoN, BA, RN, CMSRN, RNCC  [facility]  Covering Units 6C Beds 3983-4374/OBS  Phone: [phone]  Available on Vocera search 6C 6502-14 RNCC or OBS RNCC  After Hours [phone]     6[address] Beds 4842-4480 SW Ph: [phone]     6B/CRNCC Weekend/holiday on Vocera or [phone]     [facility] RNCC ED/5 Ortho/5 Med/Surg [phone]     [facility] RNCC 6 Med/Surg 8A, [phone]     Observation SW and weekend/after hours phone: [phone]

## 2024-10-30 ENCOUNTER — APPOINTMENT (OUTPATIENT)
Dept: OCCUPATIONAL THERAPY | Facility: CLINIC | Age: 74
DRG: 286 | End: 2024-10-30
Payer: MEDICARE

## 2024-10-30 LAB
ALBUMIN SERPL BCG-MCNC: 3.3 G/DL (ref 3.5–5.2)
ALP SERPL-CCNC: 119 U/L (ref 40–150)
ALT SERPL W P-5'-P-CCNC: 42 U/L (ref 0–70)
ANION GAP SERPL CALCULATED.3IONS-SCNC: 10 MMOL/L (ref 7–15)
AST SERPL W P-5'-P-CCNC: 41 U/L (ref 0–45)
BILIRUB SERPL-MCNC: 0.5 MG/DL
BUN SERPL-MCNC: 13.9 MG/DL (ref 8–23)
CALCIUM SERPL-MCNC: 8.7 MG/DL (ref 8.8–10.4)
CHLORIDE SERPL-SCNC: 91 MMOL/L (ref 98–107)
CREAT SERPL-MCNC: 0.6 MG/DL (ref 0.67–1.17)
EGFRCR SERPLBLD CKD-EPI 2021: >90 ML/MIN/1.73M2
ERYTHROCYTE [DISTWIDTH] IN BLOOD BY AUTOMATED COUNT: 16.4 % (ref 10–15)
GLUCOSE SERPL-MCNC: 87 MG/DL (ref 70–99)
HCO3 SERPL-SCNC: 23 MMOL/L (ref 22–29)
HCT VFR BLD AUTO: 31.8 % (ref 40–53)
HGB BLD-MCNC: 10.4 G/DL (ref 13.3–17.7)
INR PPP: 2.29 (ref 0.85–1.15)
LDH SERPL L TO P-CCNC: 234 U/L (ref 0–250)
MCH RBC QN AUTO: 30.4 PG (ref 26.5–33)
MCHC RBC AUTO-ENTMCNC: 32.7 G/DL (ref 31.5–36.5)
MCV RBC AUTO: 93 FL (ref 78–100)
PLATELET # BLD AUTO: 195 10E3/UL (ref 150–450)
POTASSIUM SERPL-SCNC: 3.9 MMOL/L (ref 3.4–5.3)
PROT SERPL-MCNC: 6.5 G/DL (ref 6.4–8.3)
RBC # BLD AUTO: 3.42 10E6/UL (ref 4.4–5.9)
SODIUM SERPL-SCNC: 124 MMOL/L (ref 135–145)
WBC # BLD AUTO: 5.5 10E3/UL (ref 4–11)

## 2024-10-30 PROCEDURE — 97530 THERAPEUTIC ACTIVITIES: CPT | Mod: GO

## 2024-10-30 PROCEDURE — 93750 INTERROGATION VAD IN PERSON: CPT | Performed by: NURSE PRACTITIONER

## 2024-10-30 PROCEDURE — 36415 COLL VENOUS BLD VENIPUNCTURE: CPT | Performed by: PHYSICIAN ASSISTANT

## 2024-10-30 PROCEDURE — 97535 SELF CARE MNGMENT TRAINING: CPT | Mod: GO

## 2024-10-30 PROCEDURE — 120N000003 HC R&B IMCU UMMC

## 2024-10-30 PROCEDURE — 85014 HEMATOCRIT: CPT | Performed by: NURSE PRACTITIONER

## 2024-10-30 PROCEDURE — 83615 LACTATE (LD) (LDH) ENZYME: CPT | Performed by: PHYSICIAN ASSISTANT

## 2024-10-30 PROCEDURE — 85610 PROTHROMBIN TIME: CPT | Performed by: NURSE PRACTITIONER

## 2024-10-30 PROCEDURE — 250N000013 HC RX MED GY IP 250 OP 250 PS 637: Performed by: PHYSICIAN ASSISTANT

## 2024-10-30 PROCEDURE — 80053 COMPREHEN METABOLIC PANEL: CPT | Performed by: PHYSICIAN ASSISTANT

## 2024-10-30 PROCEDURE — 250N000011 HC RX IP 250 OP 636

## 2024-10-30 PROCEDURE — 250N000013 HC RX MED GY IP 250 OP 250 PS 637: Performed by: NURSE PRACTITIONER

## 2024-10-30 PROCEDURE — 250N000013 HC RX MED GY IP 250 OP 250 PS 637: Performed by: INTERNAL MEDICINE

## 2024-10-30 PROCEDURE — 99232 SBSQ HOSP IP/OBS MODERATE 35: CPT | Mod: 25 | Performed by: NURSE PRACTITIONER

## 2024-10-30 PROCEDURE — 82040 ASSAY OF SERUM ALBUMIN: CPT | Performed by: PHYSICIAN ASSISTANT

## 2024-10-30 RX ORDER — WARFARIN SODIUM 2.5 MG/1
2.5 TABLET ORAL
Status: COMPLETED | OUTPATIENT
Start: 2024-10-30 | End: 2024-10-30

## 2024-10-30 RX ORDER — SPIRONOLACTONE 25 MG/1
25 TABLET ORAL DAILY
Status: DISCONTINUED | OUTPATIENT
Start: 2024-10-30 | End: 2024-10-31

## 2024-10-30 RX ORDER — HYDRALAZINE HYDROCHLORIDE 20 MG/ML
5 INJECTION INTRAMUSCULAR; INTRAVENOUS EVERY 8 HOURS PRN
Status: DISCONTINUED | OUTPATIENT
Start: 2024-10-30 | End: 2024-10-31

## 2024-10-30 RX ORDER — HYDRALAZINE HYDROCHLORIDE 20 MG/ML
5 INJECTION INTRAMUSCULAR; INTRAVENOUS
Status: COMPLETED | OUTPATIENT
Start: 2024-10-30 | End: 2024-10-30

## 2024-10-30 RX ADMIN — Medication 62.5 MG: at 06:16

## 2024-10-30 RX ADMIN — Medication 62.5 MG: at 21:45

## 2024-10-30 RX ADMIN — Medication 1 TABLET: at 08:34

## 2024-10-30 RX ADMIN — LOSARTAN POTASSIUM 50 MG: 50 TABLET, FILM COATED ORAL at 20:03

## 2024-10-30 RX ADMIN — LOSARTAN POTASSIUM 50 MG: 50 TABLET, FILM COATED ORAL at 08:35

## 2024-10-30 RX ADMIN — ATORVASTATIN CALCIUM 80 MG: 80 TABLET, FILM COATED ORAL at 20:03

## 2024-10-30 RX ADMIN — ESCITALOPRAM OXALATE 10 MG: 10 TABLET ORAL at 08:35

## 2024-10-30 RX ADMIN — AMLODIPINE BESYLATE 5 MG: 5 TABLET ORAL at 08:35

## 2024-10-30 RX ADMIN — HYDRALAZINE HYDROCHLORIDE 75 MG: 25 TABLET ORAL at 12:52

## 2024-10-30 RX ADMIN — SPIRONOLACTONE 25 MG: 25 TABLET, FILM COATED ORAL at 08:41

## 2024-10-30 RX ADMIN — HYDRALAZINE HYDROCHLORIDE 5 MG: 20 INJECTION INTRAMUSCULAR; INTRAVENOUS at 04:20

## 2024-10-30 RX ADMIN — AMIODARONE HYDROCHLORIDE 200 MG: 200 TABLET ORAL at 08:35

## 2024-10-30 RX ADMIN — WARFARIN SODIUM 2.5 MG: 2.5 TABLET ORAL at 17:53

## 2024-10-30 RX ADMIN — GABAPENTIN 100 MG: 100 CAPSULE ORAL at 21:45

## 2024-10-30 RX ADMIN — MAGNESIUM OXIDE TAB 400 MG (241.3 MG ELEMENTAL MG) 400 MG: 400 (241.3 MG) TAB at 08:34

## 2024-10-30 NOTE — PROGRESS NOTES
[facility]   Cardiology II Service / Advanced Heart Failure  Daily Progress Note      Patient: Duane C Johnson  MRN: [id number]  Admission Date: 10/23/2024  Hospital Day # 7    Assessment and Plan: Duane C Johnson is a 74 year old male who presents from LVAD clinic with hypotension, dizziness, and new right arm weakness. Doppler MAP in clinic ranging 45-48. In reviewing the TCU chart, MAPS have been ranging from 60-84 with well controled HRs in the 50s-60s. For afterload his hydralazine was decreased yesterday from 100 mg q8h to 75 mg every 8 hours. He is still getting losartan 50 mg BID, although one dose was held yesterday. Last low flow alarm 10/19. He had several prolonged low flow alarms on 10/17 for which hydralazine was increased. CT-A was done on 10/17/24 with no outflow graft ostruction. The outflow graft does make an acute angle as it approaches the aorta. The patient's LVAD has had frequent low flow alarms over the course of his hospitalization with elevated MAPs.    Today's Plan:  - increase blane 25 mg daily  - encourage PO intake with non-free water, encourage salty snacks  - hematocrit setting on VAD PBU adjusted to 20  - low flow controlled form filled out today  - ?RHC with ramp prior to discharge     # Acute on chronic systolic heart failure secondary to ICM   # s/p HM3 LVAD as DT on 9/18/2024  # CAD s/p PCI  # History of STEMI  # s/p ICD 5/2024  # Acute angle of outflow fraft  Stage D. NYHA Class III.    10/23/24 RA 6, PA 40/14/20, PCW 9, Goldy CO/CI 4.54/2.59 at 5100 rpm    Fluid status: euvolemic   ACEi/ARB/ARNi:  losartan 50 BID  Vasodilator:  - Hydralazine 62.5 mg q6h given ongoing hypertension. Had significant hypotension on 75 mg TID but ?if dehydrated at that time as well.   - Amlodipine 5 mg daily  BB: deferred due to recent VAD  Aldosterone antagonist: increase to 25 mg daily  SGLT2i: STOPPED  Empagliflozin 10 mg d/t propensity for hypovolemia  SCD prophylaxis: ICD  MAP:  goal 65-85, remains elevated   LDH trends: stable 200s  Anticoagulation: warfarin dosing per pharmacy, INR goal 2-3  Antiplatelet: ASA not indicated in LVAD population, > 6 months s/p STEMI     # Low flow alarms  # Elevated PIs  # Labile MAPs   # Suspected orthostatic hypotension   Speed optimization echo performed 10/1, speed decreased to 5100. Low flows seems releated to hypertension and some hypovolemia as well. Have improved with re-timing hydralazine and after IV fluids. CT-A was done on  10/17/24 with no outflow graft ostruction. The outflow graft does make an acute angle as it approaches the aorta, discussed with Dr. Valentine, unlikely to be impacting the low flows at this time.  Having daily low flow alarms, always in the setting of htn. Mostly ~5 am, although has had some in the evening as well. They do not seem to be positional. RHC with reasonable filling pressures as above. Has been asymptomatic.  - Decreased speed to 5000 on 10/27  - Losartan 50 mg BID  - Hydralazine 62.5 mg q6h- he feels he can do this at home, but ideally will transition back to every every 8 hours prior to discharge   - amlodipine 5 mg daily  - HOLDING fludrocortisone 0.1 mg daily   - Increase PO intake as above, specifically right before bed  - pursuing low-flow controller vs adjusting hematocrit on VAD setting     # History of VT/VF arrest 2023  # AFib s/p DCCV 9/2024 and again 9/30/2024   Successfully cardioverted in early September for AF. Since then EKG suggested AF on 9/21. Tele is only available from as early as 9/22. The patient was started on hep gtt 9/21, but unclear if was in AF before this. While the patient was on hep gtt until INR was therapeutic, it is not possible to assess rhythm prior to 9/21. Systemic AC was off on 9/18-9/20. S/p MELBA and DCCV on 9/30 with conversion to sinus rhythm.  10/23 device interrogation shows persistent episode of AF since 10/10 with rates   - Continue amiodarone 200 mg daily  - Ongoing a  "fib- given ongoing low flows- deferring cardioversion for now given reasonable rate control. Will discuss adding digoxin as well  - Continue AC as above     # Visual changes, resolved.   # Generalized weakness  # Headache  Stoke code called 9/29 for visual changes - right eye with decreased upper half of vision and bluish haze. Resolved after 30 minutes. Seen by opthalmology and neuro. Negative head CT and CTA head and neck. He has had 3 episodes which last about 5 minutes before resolving completely.      On 10/9 Stroke code activated due to concern of generalized weakness, numbness and headache. LKW reported as 0715 when he worked with therapies and shortly after started feeling generally weak. Then around 1500 he had onset of moderate \"tension\" type headache and tingling in the bilateral fingertips. Per RRT URIAH patient now reports tingling has resolved and headache is improving. MAP 48, INR 2.2. Given absence of focal deficits and rapidly improving symptoms, opted to cancel stroke.  CT head negative, though did show chronic maxillary sinusitis.      Stroke code called 10/23 when seen in clinic with report of new RUE weakness. He was notably hypotensive at this time with MAP ~45 with new right pronator drift. Evaluated in ED, CT and CTA head and neck negative. RUE weakness resolved.   - monitor for changes- no current symptoms     # Hypovolemia hyponatremia  Na 123   - encourage high sodium PO intake/gatorade and salty  - daily BMP    # Blood culture postivie for staph epi. 1/2 bottles. No fevers. No symptoms  - Got one dose of Iv vanco, stopped given likely contaminent  - Monitor for further symptoms  - [facility] continue to [facility]    FEN: regular diet  PROPHY:  warfarin  LINES:  PIV  DISPO:  2-3 days  CODE STATUS:  Full Code      Time/Communication  I spent 55 minutes reviewing results, care team notes, meeting directly with the patient, coordinating care, and completing " "documentation on the day of service.     Patient discussed with Dr. Anthony Moreira, DNP, NP-C  Nurse Practitioner - Advanced Heart Failure/Cardiology II Service  Rhett preferred or Pager [phone]    ================================================================    Subjective/24-Hr Events:   Last 24 hr care team notes reviewed. Several low flows around 0300. Received IV hydralazine. MAPs this AM still 90s. Feeing ok.     ROS:  4 point ROS including respiratory, CV, GI and  (other than that noted in the HPI) is negative.     Medications: Reviewed in EPIC.     Physical Exam:   BP 96/73 (Cuff Size: Adult Small)   Pulse 85   Temp 97.4  F (36.3  C) (Oral)   Resp 14   Ht 1.702 m (5' 7\")   Wt 61.3 kg (135 lb 1.6 oz)   SpO2 99%   BMI 21.16 kg/m      GENERAL: Appears comfortable, in no distress.  HEENT: Eye symmetrical, no discharge or icterus bilaterally.   NECK: Supple, JVD at clavicle at 90 degrees.   CV: tachycardic, irregular, +LVAD hum   RESPIRATORY: Respirations regular, even, and unlabored. Lungs CTA throughout.    GI: Soft and non distended. No tenderness. Normal bowel sounds present  EXTREMITIES: No peripheral edema. All extremities are warm and well perfused  NEUROLOGIC: Alert and interacting appropriatly. No focal deficits.   MUSCULOSKELETAL: No joint swelling or tenderness.   SKIN: No jaundice. No rashes or lesions.     Labs:  CMP  Recent Labs   Lab 10/30/24  0554 10/29/24  0547 10/28/24  0529 10/27/24  0628 10/23/24  2233 10/23/24  1137 10/23/24  0927 10/23/24  0926   * 123* 130* 129*   < >  --   --  129*  132*   POTASSIUM 3.9 4.0 4.3 4.1   < >  --   --  3.8  4.5   CHLORIDE 91* 92* 96* 96*   < >  --   --  94*   CO2 23 22 25 23   < >  --   --  23   ANIONGAP 10 9 9 10   < >  --   --  12   GLC 87 91 86 91   < >  --   --  97  108*   BUN 13.9 15.4 21.4 19.5   < >  --   --  24.4*   CR 0.60* 0.56* 0.64* 0.61*   < >  --    < > 0.77   GFRESTIMATED >90 >90 >90 >90   < >  --    < > >90   PRANAV " 8.7* 8.6* 8.6* 8.5*   < >  --   --  8.6*   MAG  --   --   --   --   --  1.8  --  1.9   PROTTOTAL 6.5 6.1* 6.3* 6.1*   < >  --   --   --    ALBUMIN 3.3* 3.0* 3.1* 3.0*   < >  --   --   --    BILITOTAL 0.5 0.4 0.4 0.4   < >  --   --   --    ALKPHOS 119 117 126 116   < >  --   --   --    AST 41 43 42 49*   < >  --   --   --    ALT 42 41 47 41   < >  --   --   --     < > = values in this interval not displayed.       CBC  Recent Labs   Lab 10/30/24  0554 10/28/24  0529 10/26/24  0628 10/25/24  1320   WBC 5.5 6.1 5.8  5.8 6.0   RBC 3.42* 3.39* 3.26*  3.26* 3.26*   HGB 10.4* 10.5* 9.9*  9.9* 10.1*   HCT 31.8* 32.4* 31.1*  31.1* 31.3*   MCV 93 96 95  95 96   MCH 30.4 31.0 30.4  30.4 31.0   MCHC 32.7 32.4 31.8  31.8 32.3   RDW 16.4* 16.6* 16.4*  16.4* 16.8*    190 189  189 185       INR  Recent Labs   Lab 10/30/24  0554 10/29/24  0547 10/28/24  0529 10/27/24  0628   INR 2.29* 2.25* 2.38* 2.47*

## 2024-10-30 NOTE — PLAN OF CARE
Nursing Plan of Care Note  Shift: [id number]    Neuro: A&Ox4. Able to follow commands and make needs known. PEERL. Afebrile.   Pain: denies  Cardiac/Resp: LVAD Heartmate 3 (09/2024). Dressing changed. A fib, HR in 90s. Doppler MAPs (left arm) in the 70s. No chest pain or edema. Sats >92% on RA.   GI: Voids spontaneously.  : Bowel sounds active. Last BM during shift. Tolerating easy to chew diet. Low appetite. Encouraged ensures. Patient drank 6/7 glasses of water.  Skin: Scattered bruising. Old sternal incision.   Access: L/R PIV - SL  Activity: Stand by assist. Up in hallways walking x2. Using battery pack on LVAD.    Plan: Once patient switches LVAD back to [facility], change hematocrit setting to actual hematocrit, per order.   Continue with plan of care. Notify primary team with changes.       Goal Outcome Evaluation:      Plan of Care Reviewed With: patient    Overall Patient Progress: improvingOverall Patient Progress: improving    Outcome Evaluation: No LVAD alarms, Doppler MAPs in 70s, ambulated in the beckre

## 2024-10-30 NOTE — PLAN OF CARE
Shift:  [id number]    Neuro: A&Ox4.   Cardiac: Afib, LVAD DMP 78, Afebrile, VSS.   Respiratory: Lungs sound clear, no cough, Sat> 94% on RA  GI/: Active bowel sound, passing flatus, last BM 10/29, and Voiding spontaneously. No BM this shift.  Diet/appetite: Tolerating easy to chew level 7 diet. Denies nausea.  Activity: Up with assist standby with GB and walker    Pain: Denies   Skin: No new deficits noted.  Lines: Piv x2, SL'D  Goal Outcome Evaluation:      Plan of Care Reviewed With: patient    Overall Patient Progress: improvingOverall Patient Progress: improving    Outcome Evaluation: No LVAD low flow this time, Pt ambulated in the becker way    Plan: Continue to follow plan of cares and update Team with any change in status

## 2024-10-30 NOTE — PLAN OF CARE
D: pt admitted on 10/23 with hypotension, dizziness, and new right arm weakness. LVAD pt. And came in with MAPs 45-48.  PMH of acute on chronic systolic HF 2/2 IMC. HM3. STEMI. CAD s/p PCI.    PRN Med: IV Hydralazine        A:   Neuro: Ax4 Denies Headache, dizziness,  Lightheadedness, numbness and tingling. calls appropriately   Cardiac: Afib 90's-100's. Denies chest pain. Multiple low flow alarms starting around 0330 am with correlating high PI's. . Provider notified. IV hydralazine one time PRN given. MAPs taken on L arm.  Respiratory: sating >95 ON RA. denies SOB.  Diet/appetite: Regular easy chew diet.   Endocrine: BS check ACHS   GI/:  Denies abdominal pain. Good urine output.   Activity:  Moves independently.  Pain: Denies.  Skin: No new deficits to note.    PLAN: Increase fluid intake. 7, 16oz glasses of water daily, with 2 of those being in the hour before bed.

## 2024-10-30 NOTE — PROGRESS NOTES
"[employer] industry rep in pt's room and reprogrammed primary and backup controllers to \"low flow\" controllers.  Low flow alarm will go now off for <2.0.  Pt educated on the change to his alarm settings.  Hematocrit currently set at 20% per providers and remain for the time being.  Team to readdress prior to discharge.    "

## 2024-10-31 ENCOUNTER — APPOINTMENT (OUTPATIENT)
Dept: PHYSICAL THERAPY | Facility: CLINIC | Age: 74
DRG: 286 | End: 2024-10-31
Payer: MEDICARE

## 2024-10-31 ENCOUNTER — APPOINTMENT (OUTPATIENT)
Dept: OCCUPATIONAL THERAPY | Facility: CLINIC | Age: 74
DRG: 286 | End: 2024-10-31
Payer: MEDICARE

## 2024-10-31 LAB
ALBUMIN SERPL BCG-MCNC: 3.2 G/DL (ref 3.5–5.2)
ALP SERPL-CCNC: 120 U/L (ref 40–150)
ALT SERPL W P-5'-P-CCNC: 40 U/L (ref 0–70)
ANION GAP SERPL CALCULATED.3IONS-SCNC: 9 MMOL/L (ref 7–15)
AST SERPL W P-5'-P-CCNC: 38 U/L (ref 0–45)
BILIRUB SERPL-MCNC: 0.5 MG/DL
BUN SERPL-MCNC: 15.3 MG/DL (ref 8–23)
CALCIUM SERPL-MCNC: 8.7 MG/DL (ref 8.8–10.4)
CHLORIDE SERPL-SCNC: 91 MMOL/L (ref 98–107)
CREAT SERPL-MCNC: 0.57 MG/DL (ref 0.67–1.17)
EGFRCR SERPLBLD CKD-EPI 2021: >90 ML/MIN/1.73M2
GLUCOSE SERPL-MCNC: 91 MG/DL (ref 70–99)
HCO3 SERPL-SCNC: 22 MMOL/L (ref 22–29)
INR PPP: 2.51 (ref 0.85–1.15)
LDH SERPL L TO P-CCNC: 246 U/L (ref 0–250)
POTASSIUM SERPL-SCNC: 4.1 MMOL/L (ref 3.4–5.3)
PROT SERPL-MCNC: 6.2 G/DL (ref 6.4–8.3)
SODIUM SERPL-SCNC: 122 MMOL/L (ref 135–145)

## 2024-10-31 PROCEDURE — 97110 THERAPEUTIC EXERCISES: CPT | Mod: GO

## 2024-10-31 PROCEDURE — 250N000013 HC RX MED GY IP 250 OP 250 PS 637: Performed by: INTERNAL MEDICINE

## 2024-10-31 PROCEDURE — 120N000003 HC R&B IMCU UMMC

## 2024-10-31 PROCEDURE — 99232 SBSQ HOSP IP/OBS MODERATE 35: CPT | Mod: 25 | Performed by: NURSE PRACTITIONER

## 2024-10-31 PROCEDURE — 36415 COLL VENOUS BLD VENIPUNCTURE: CPT | Performed by: PHYSICIAN ASSISTANT

## 2024-10-31 PROCEDURE — 250N000011 HC RX IP 250 OP 636

## 2024-10-31 PROCEDURE — 83615 LACTATE (LD) (LDH) ENZYME: CPT | Performed by: PHYSICIAN ASSISTANT

## 2024-10-31 PROCEDURE — 85610 PROTHROMBIN TIME: CPT | Performed by: NURSE PRACTITIONER

## 2024-10-31 PROCEDURE — 250N000013 HC RX MED GY IP 250 OP 250 PS 637: Performed by: NURSE PRACTITIONER

## 2024-10-31 PROCEDURE — 93750 INTERROGATION VAD IN PERSON: CPT | Performed by: NURSE PRACTITIONER

## 2024-10-31 PROCEDURE — 97530 THERAPEUTIC ACTIVITIES: CPT | Mod: GP

## 2024-10-31 PROCEDURE — 80053 COMPREHEN METABOLIC PANEL: CPT | Performed by: PHYSICIAN ASSISTANT

## 2024-10-31 PROCEDURE — 97116 GAIT TRAINING THERAPY: CPT | Mod: GP

## 2024-10-31 PROCEDURE — 97535 SELF CARE MNGMENT TRAINING: CPT | Mod: GO

## 2024-10-31 RX ORDER — WARFARIN SODIUM 2.5 MG/1
2.5 TABLET ORAL
Status: COMPLETED | OUTPATIENT
Start: 2024-10-31 | End: 2024-10-31

## 2024-10-31 RX ORDER — HYDRALAZINE HYDROCHLORIDE 20 MG/ML
5 INJECTION INTRAMUSCULAR; INTRAVENOUS ONCE
Status: COMPLETED | OUTPATIENT
Start: 2024-10-31 | End: 2024-10-31

## 2024-10-31 RX ORDER — HYDRALAZINE HYDROCHLORIDE 20 MG/ML
5 INJECTION INTRAMUSCULAR; INTRAVENOUS EVERY 8 HOURS PRN
Status: DISCONTINUED | OUTPATIENT
Start: 2024-10-31 | End: 2024-11-09 | Stop reason: HOSPADM

## 2024-10-31 RX ORDER — AMLODIPINE BESYLATE 10 MG/1
10 TABLET ORAL DAILY
Status: DISCONTINUED | OUTPATIENT
Start: 2024-11-01 | End: 2024-11-09 | Stop reason: HOSPADM

## 2024-10-31 RX ORDER — HYDRALAZINE HYDROCHLORIDE 20 MG/ML
5 INJECTION INTRAMUSCULAR; INTRAVENOUS EVERY 8 HOURS PRN
Status: DISCONTINUED | OUTPATIENT
Start: 2024-10-31 | End: 2024-10-31

## 2024-10-31 RX ORDER — SPIRONOLACTONE 25 MG
12.5 TABLET ORAL DAILY
Status: DISCONTINUED | OUTPATIENT
Start: 2024-10-31 | End: 2024-10-31

## 2024-10-31 RX ADMIN — AMLODIPINE BESYLATE 5 MG: 5 TABLET ORAL at 07:42

## 2024-10-31 RX ADMIN — HYDRALAZINE HYDROCHLORIDE 5 MG: 20 INJECTION INTRAMUSCULAR; INTRAVENOUS at 07:06

## 2024-10-31 RX ADMIN — HYDRALAZINE HYDROCHLORIDE 5 MG: 20 INJECTION INTRAMUSCULAR; INTRAVENOUS at 03:25

## 2024-10-31 RX ADMIN — LOSARTAN POTASSIUM 50 MG: 50 TABLET, FILM COATED ORAL at 20:10

## 2024-10-31 RX ADMIN — HYDRALAZINE HYDROCHLORIDE 75 MG: 25 TABLET ORAL at 13:56

## 2024-10-31 RX ADMIN — GABAPENTIN 100 MG: 100 CAPSULE ORAL at 21:37

## 2024-10-31 RX ADMIN — WARFARIN SODIUM 2.5 MG: 2.5 TABLET ORAL at 18:46

## 2024-10-31 RX ADMIN — ATORVASTATIN CALCIUM 80 MG: 80 TABLET, FILM COATED ORAL at 20:10

## 2024-10-31 RX ADMIN — ESCITALOPRAM OXALATE 10 MG: 10 TABLET ORAL at 07:42

## 2024-10-31 RX ADMIN — SPIRONOLACTONE 12.5 MG: 25 TABLET, FILM COATED ORAL at 07:41

## 2024-10-31 RX ADMIN — AMIODARONE HYDROCHLORIDE 200 MG: 200 TABLET ORAL at 07:42

## 2024-10-31 RX ADMIN — LOSARTAN POTASSIUM 50 MG: 50 TABLET, FILM COATED ORAL at 07:41

## 2024-10-31 RX ADMIN — MAGNESIUM OXIDE TAB 400 MG (241.3 MG ELEMENTAL MG) 400 MG: 400 (241.3 MG) TAB at 07:41

## 2024-10-31 RX ADMIN — Medication 62.5 MG: at 05:11

## 2024-10-31 RX ADMIN — Medication 1 TABLET: at 07:41

## 2024-10-31 RX ADMIN — HYDRALAZINE HYDROCHLORIDE 75 MG: 25 TABLET ORAL at 21:37

## 2024-10-31 NOTE — PLAN OF CARE
Neuro: A&Ox4. Calls appropriately  Cardiac: Afib with BBB and occ PVCs, LVAD hum. Maps 80's at start of shift.   Low flow and high PI with elev MAPs (100)  this morning. Provider aware.   Respiratory: RA, satting >95%  GI/: Frequent voiding, increase fluid intake, works on taking 8 glasses/24hrs. No BM this shift.  Diet/appetite: Tolerating easy to chew diet. Denies nausea.  Activity: Up ad mackenzie/SBA    Pain: Denies   Skin: sternal inc, old CT sites, Driveline sites, scattered bruising.   Lines: 2 PIVs- SL  Plan: Cont with POC and update Primary Team with changes    Shift events: Low flow, high PI and elev maps 100. Provider notified and ordered 1 time dose of Hydralazine 5mg IV at 7am. PRN Hydralazine IV x 1 also given. Pt asymptomatic and reports feeling good.

## 2024-10-31 NOTE — PLAN OF CARE
"  BP 96/73 (Cuff Size: Adult Small)   Pulse 103   Temp 97.8  F (36.6  C) (Oral)   Resp 16   Ht 1.702 m (5' 7\")   Wt 61.3 kg (135 lb 1.6 oz)   SpO2 97%   BMI 21.16 kg/m      0300-2010    Goal Outcome Evaluation:  Plan of care reviewed with: patient   Overall patient progress: no change      A&Ox4, denied pain. LVAD heart mate 3. Doppler MAP 84 at 2200. Bedside urinal. SBA for activity. LVAD connected to monitor. Hematocrit updated on monitor, no beep after. Cards 2 for any concerns.     "

## 2024-10-31 NOTE — PROCEDURES
The patient's HeartMate LVAD was interrogated 10/31/2024  * Speed 5000 rpm   * Pulsatility index 3.5-7  * Power 3.2 Dahl   * Flow 2.7-3.4 L/minute   Fluid status: euvolemic   Alarms were reviewed, and notable for no low flow alarms, clustered PI events yesterday.   The driveline exit site was inspected, c/d/i.   All external components were inspected and showed no evidence of damage or malfunction, none replaced.   No changes to VAD settings made

## 2024-10-31 NOTE — PROVIDER NOTIFICATION
0325: Cards 2 resident Roberto MCCRAY Notified of low flow 2.4 to 2.5, high PI 8.6 and map of 100. Pt denies symptoms. Just went to BR and back to bed.   Hydralazine 5mg IV given.

## 2024-10-31 NOTE — PROGRESS NOTES
D/He is awake and alert with normal neuros and talkative on interaction. He is ordering takeout food per his usual. He tells me he that he therapy really wore him out today.  A/BP and VAD numbers are okay  A/VAD dressing site has healthy skin around insertion site, and no drainage or redness at the site  P/monitor for changes

## 2024-10-31 NOTE — PROGRESS NOTES
General Appearance: A+O VSS   Respiratory: Lungs CTA on room air  Cardiovascular: LVAD hum, Atrial fibrilation  GI: Eating fair to poor. No BM today  Skin: CDI   Other: Up with OT/PT today gets tired and PI dropped.

## 2024-10-31 NOTE — PROGRESS NOTES
[facility]   Cardiology II Service / Advanced Heart Failure  Daily Progress Note      Patient: Duane C Johnson  MRN: [id number]  Admission Date: 10/23/2024  Hospital Day # 8    Assessment and Plan: Duane C Johnson is a 74 year old male who presents from LVAD clinic with hypotension, dizziness, and new right arm weakness. Doppler MAP in clinic ranging 45-48. In reviewing the TCU chart, MAPS have been ranging from 60-84 with well controled HRs in the 50s-60s. For afterload his hydralazine was decreased yesterday from 100 mg q8h to 75 mg every 8 hours. He is still getting losartan 50 mg BID, although one dose was held yesterday. Last low flow alarm 10/19. He had several prolonged low flow alarms on 10/17 for which hydralazine was increased. CT-A was done on 10/17/24 with no outflow graft ostruction. The outflow graft does make an acute angle as it approaches the aorta. The patient's LVAD has had frequent low flow alarms over the course of his hospitalization with elevated MAPs.    Today's Plan:  - encourage PO intake with non-free water, encourage salty snacks  - reset hematocrit on PBU  - increase amlodipine  - stop blane for hypoNa    # Acute on chronic systolic heart failure secondary to ICM   # s/p HM3 LVAD as DT on 9/18/2024  # CAD s/p PCI  # History of STEMI  # s/p ICD 5/2024  # Acute angle of outflow fraft  Stage D. NYHA Class III.    10/23/24 RA 6, PA 40/14/20, PCW 9, Goldy CO/CI 4.54/2.59 at 5100 rpm    Fluid status: euvolemic   ACEi/ARB/ARNi: losartan 50 BID  Vasodilator: Had significant hypotension on 75 mg TID but ?if dehydrated at that time as well. Increase back to 75 mg q8hr today, increase amlodipine to 10 mg daily  BB: deferred due to recent VAD  Aldosterone antagonist:STOPPED blane due to hypoNs  SGLT2i: STOPPED  Empagliflozin 10 mg d/t propensity for hypovolemia  SCD prophylaxis: ICD  MAP: goal 65-90, remains elevated, 90s-100, see above  LDH trends: stable 200s  Anticoagulation:  warfarin dosing per pharmacy, INR goal 2-3  Antiplatelet: ASA not indicated in LVAD population, > 6 months s/p STEMI     # Low flow alarms, resolved  # Elevated PIs  # Labile MAPs   # Suspected orthostatic hypotension, resolved  Speed optimization echo performed 10/1, speed decreased to 5100. Low flows seems releated to hypertension and some hypovolemia as well. Have improved with re-timing hydralazine and after IV fluids. CT-A was done on  10/17/24 with no outflow graft ostruction. The outflow graft does make an acute angle as it approaches the aorta, discussed with Dr. Valentine, unlikely to be impacting the low flows at this time.  Having daily low flow alarms, always in the setting of htn. Mostly ~5 am, although has had some in the evening as well. They do not seem to be positional. RHC with reasonable filling pressures as above. Has been asymptomatic.  - Decreased speed to 5000 on 10/27  - Losartan 50 mg BID  - hydralazine and amlodipine adjustments as above  - HOLDING fludrocortisone 0.1 mg daily   - Increase PO intake as above, specifically right before bed, non free-water sources due to hypoNa  - temp drop in hematocrit on monitor 10/29-10/30, low-flow controller change out 10/30, change hematocrit back     # History of VT/VF arrest 2023  # AFib s/p DCCV 9/2024 and again 9/30/2024   Successfully cardioverted in early September for AF. Since then EKG suggested AF on 9/21. Tele is only available from as early as 9/22. The patient was started on hep gtt 9/21, but unclear if was in AF before this. While the patient was on hep gtt until INR was therapeutic, it is not possible to assess rhythm prior to 9/21. Systemic AC was off on 9/18-9/20. S/p MELBA and DCCV on 9/30 with conversion to sinus rhythm.  10/23 device interrogation shows persistent episode of AF since 10/10 with rates   - Continue amiodarone 200 mg daily  - Ongoing a fib- given ongoing low flows- deferring cardioversion for now given reasonable rate  "control. Will discuss adding digoxin as well  - Continue AC as above     # Visual changes, resolved.   # Generalized weakness  # Headache  Stoke code called 9/29 for visual changes - right eye with decreased upper half of vision and bluish haze. Resolved after 30 minutes. Seen by opthalmology and neuro. Negative head CT and CTA head and neck. He has had 3 episodes which last about 5 minutes before resolving completely.      On 10/9 Stroke code activated due to concern of generalized weakness, numbness and headache. LKW reported as 0715 when he worked with therapies and shortly after started feeling generally weak. Then around 1500 he had onset of moderate \"tension\" type headache and tingling in the bilateral fingertips. Per RRT URIAH patient now reports tingling has resolved and headache is improving. MAP 48, INR 2.2. Given absence of focal deficits and rapidly improving symptoms, opted to cancel stroke.  CT head negative, though did show chronic maxillary sinusitis.      Stroke code called 10/23 when seen in clinic with report of new RUE weakness. He was notably hypotensive at this time with MAP ~45 with new right pronator drift. Evaluated in ED, CT and CTA head and neck negative. RUE weakness resolved.   - monitor for changes- no current symptoms     # Hypovolemia hyponatremia  Na 123   - encourage high sodium PO intake/gatorade and salty foods  - daily BMP    # Blood culture postivie for staph epi. 1/2 bottles. No fevers. No symptoms.  - received one dose IV vanco, stopped given likely contaminant  - Repeat blood culture pending 10/25 negative, continue to follow  - monitor    FEN: regular diet  PROPHY:  warfarin  LINES:  PIV  DISPO:  possible discharge tomorrow  CODE STATUS:  Full Code    Time/Communication  I spent 55 minutes reviewing results, care team notes, meeting directly with the patient, coordinating care, and completing documentation on the day of service.     Patient discussed with Dr. Anthony Vera " "CHICO Moreira, NP-C  Nurse Practitioner - Advanced Heart Failure/Cardiology II Service  [facility] preferred or Pager [phone]    ================================================================    Subjective/24-Hr Events:   Last 24 hr care team notes reviewed. No low flow alarms overnight. Did have high MAPs 100s - received 5 mg IV hydralazine. No orthostasis this AM. Slept well.     ROS:  4 point ROS including respiratory, CV, GI and  (other than that noted in the HPI) is negative.     Medications: Reviewed in EPIC.     Physical Exam:   BP 96/73 (Cuff Size: Adult Small)   Pulse 101   Temp 97.5  F (36.4  C) (Oral)   Resp 18   Ht 1.702 m (5' 7\")   Wt 61.6 kg (135 lb 14.4 oz)   SpO2 97%   BMI 21.28 kg/m      GENERAL: Appears comfortable, in no distress.  HEENT: Eye symmetrical, no discharge or icterus bilaterally.   NECK: Supple, JVD at clavicle at 90 degrees.   CV: tachycardic, irregular, +LVAD hum   RESPIRATORY: Respirations regular, even, and unlabored. Lungs CTA throughout.    GI: Soft and non distended. No tenderness. Normal bowel sounds present  EXTREMITIES: No peripheral edema. All extremities are warm and well perfused  NEUROLOGIC: Alert and interacting appropriatly. No focal deficits.   MUSCULOSKELETAL: No joint swelling or tenderness.   SKIN: No jaundice. No rashes or lesions.     Labs:  CMP  Recent Labs   Lab 10/31/24  0533 10/30/24  0554 10/29/24  0547 10/28/24  0529   * 124* 123* 130*   POTASSIUM 4.1 3.9 4.0 4.3   CHLORIDE 91* 91* 92* 96*   CO2 22 23 22 25   ANIONGAP 9 10 9 9   GLC 91 87 91 86   BUN 15.3 13.9 15.4 21.4   CR 0.57* 0.60* 0.56* 0.64*   GFRESTIMATED >90 >90 >90 >90   PRANAV 8.7* 8.7* 8.6* 8.6*   PROTTOTAL 6.2* 6.5 6.1* 6.3*   ALBUMIN 3.2* 3.3* 3.0* 3.1*   BILITOTAL 0.5 0.5 0.4 0.4   ALKPHOS 120 119 117 126   AST 38 41 43 42   ALT 40 42 41 47       CBC  Recent Labs   Lab 10/30/24  0554 10/28/24  0529 10/26/24  0628 10/25/24  1320   WBC 5.5 6.1 5.8  5.8 6.0   RBC 3.42* 3.39* 3.26*  " 3.26* 3.26*   HGB 10.4* 10.5* 9.9*  9.9* 10.1*   HCT 31.8* 32.4* 31.1*  31.1* 31.3*   MCV 93 96 95  95 96   MCH 30.4 31.0 30.4  30.4 31.0   MCHC 32.7 32.4 31.8  31.8 32.3   RDW 16.4* 16.6* 16.4*  16.4* 16.8*    190 189  189 185       INR  Recent Labs   Lab 10/31/24  0533 10/30/24  0554 10/29/24  0547 10/28/24  0529   INR 2.51* 2.29* 2.25* 2.38*

## 2024-11-01 ENCOUNTER — CARE COORDINATION (OUTPATIENT)
Dept: CARDIOLOGY | Facility: CLINIC | Age: 74
End: 2024-11-01
Payer: MEDICARE

## 2024-11-01 DIAGNOSIS — Z95.811 LVAD (LEFT VENTRICULAR ASSIST DEVICE) PRESENT (H): ICD-10-CM

## 2024-11-01 DIAGNOSIS — I50.22 CHRONIC SYSTOLIC CONGESTIVE HEART FAILURE (H): Primary | ICD-10-CM

## 2024-11-01 DIAGNOSIS — Z79.01 ANTICOAGULATED ON WARFARIN: ICD-10-CM

## 2024-11-01 LAB
ALBUMIN SERPL BCG-MCNC: 3.3 G/DL (ref 3.5–5.2)
ALP SERPL-CCNC: 120 U/L (ref 40–150)
ALT SERPL W P-5'-P-CCNC: 40 U/L (ref 0–70)
ANION GAP SERPL CALCULATED.3IONS-SCNC: 8 MMOL/L (ref 7–15)
AST SERPL W P-5'-P-CCNC: 37 U/L (ref 0–45)
BILIRUB SERPL-MCNC: 0.4 MG/DL
BUN SERPL-MCNC: 18.7 MG/DL (ref 8–23)
CALCIUM SERPL-MCNC: 8.8 MG/DL (ref 8.8–10.4)
CHLORIDE SERPL-SCNC: 92 MMOL/L (ref 98–107)
CREAT SERPL-MCNC: 0.59 MG/DL (ref 0.67–1.17)
EGFRCR SERPLBLD CKD-EPI 2021: >90 ML/MIN/1.73M2
ERYTHROCYTE [DISTWIDTH] IN BLOOD BY AUTOMATED COUNT: 16 % (ref 10–15)
GLUCOSE SERPL-MCNC: 88 MG/DL (ref 70–99)
HCO3 SERPL-SCNC: 24 MMOL/L (ref 22–29)
HCT VFR BLD AUTO: 31.6 % (ref 40–53)
HGB BLD-MCNC: 10.3 G/DL (ref 13.3–17.7)
INR PPP: 2.85 (ref 0.85–1.15)
LDH SERPL L TO P-CCNC: 260 U/L (ref 0–250)
MCH RBC QN AUTO: 30 PG (ref 26.5–33)
MCHC RBC AUTO-ENTMCNC: 32.6 G/DL (ref 31.5–36.5)
MCV RBC AUTO: 92 FL (ref 78–100)
PLATELET # BLD AUTO: 175 10E3/UL (ref 150–450)
POTASSIUM SERPL-SCNC: 4.3 MMOL/L (ref 3.4–5.3)
PROT SERPL-MCNC: 6.4 G/DL (ref 6.4–8.3)
RBC # BLD AUTO: 3.43 10E6/UL (ref 4.4–5.9)
SODIUM SERPL-SCNC: 124 MMOL/L (ref 135–145)
WBC # BLD AUTO: 5.1 10E3/UL (ref 4–11)

## 2024-11-01 PROCEDURE — 36415 COLL VENOUS BLD VENIPUNCTURE: CPT | Performed by: PHYSICIAN ASSISTANT

## 2024-11-01 PROCEDURE — 250N000013 HC RX MED GY IP 250 OP 250 PS 637: Performed by: NURSE PRACTITIONER

## 2024-11-01 PROCEDURE — 85610 PROTHROMBIN TIME: CPT | Performed by: NURSE PRACTITIONER

## 2024-11-01 PROCEDURE — 84155 ASSAY OF PROTEIN SERUM: CPT | Performed by: PHYSICIAN ASSISTANT

## 2024-11-01 PROCEDURE — 82040 ASSAY OF SERUM ALBUMIN: CPT | Performed by: PHYSICIAN ASSISTANT

## 2024-11-01 PROCEDURE — 83615 LACTATE (LD) (LDH) ENZYME: CPT | Performed by: PHYSICIAN ASSISTANT

## 2024-11-01 PROCEDURE — 93750 INTERROGATION VAD IN PERSON: CPT | Performed by: NURSE PRACTITIONER

## 2024-11-01 PROCEDURE — 120N000003 HC R&B IMCU UMMC

## 2024-11-01 PROCEDURE — 250N000013 HC RX MED GY IP 250 OP 250 PS 637: Performed by: INTERNAL MEDICINE

## 2024-11-01 PROCEDURE — 82435 ASSAY OF BLOOD CHLORIDE: CPT | Performed by: PHYSICIAN ASSISTANT

## 2024-11-01 PROCEDURE — 99232 SBSQ HOSP IP/OBS MODERATE 35: CPT | Mod: 25 | Performed by: NURSE PRACTITIONER

## 2024-11-01 PROCEDURE — 85041 AUTOMATED RBC COUNT: CPT | Performed by: NURSE PRACTITIONER

## 2024-11-01 PROCEDURE — 85014 HEMATOCRIT: CPT | Performed by: NURSE PRACTITIONER

## 2024-11-01 RX ORDER — WARFARIN SODIUM 2.5 MG/1
2.5 TABLET ORAL
Status: COMPLETED | OUTPATIENT
Start: 2024-11-01 | End: 2024-11-01

## 2024-11-01 RX ADMIN — AMIODARONE HYDROCHLORIDE 200 MG: 200 TABLET ORAL at 07:55

## 2024-11-01 RX ADMIN — ATORVASTATIN CALCIUM 80 MG: 80 TABLET, FILM COATED ORAL at 19:26

## 2024-11-01 RX ADMIN — AMLODIPINE BESYLATE 10 MG: 10 TABLET ORAL at 07:55

## 2024-11-01 RX ADMIN — MAGNESIUM OXIDE TAB 400 MG (241.3 MG ELEMENTAL MG) 400 MG: 400 (241.3 MG) TAB at 07:54

## 2024-11-01 RX ADMIN — HYDRALAZINE HYDROCHLORIDE 75 MG: 25 TABLET ORAL at 15:59

## 2024-11-01 RX ADMIN — HYDRALAZINE HYDROCHLORIDE 75 MG: 25 TABLET ORAL at 06:56

## 2024-11-01 RX ADMIN — LOSARTAN POTASSIUM 50 MG: 50 TABLET, FILM COATED ORAL at 07:54

## 2024-11-01 RX ADMIN — WARFARIN SODIUM 2.5 MG: 2.5 TABLET ORAL at 18:15

## 2024-11-01 RX ADMIN — LOSARTAN POTASSIUM 50 MG: 50 TABLET, FILM COATED ORAL at 19:26

## 2024-11-01 RX ADMIN — HYDRALAZINE HYDROCHLORIDE 75 MG: 25 TABLET ORAL at 21:39

## 2024-11-01 RX ADMIN — Medication 1 TABLET: at 07:54

## 2024-11-01 RX ADMIN — ESCITALOPRAM OXALATE 10 MG: 10 TABLET ORAL at 07:54

## 2024-11-01 RX ADMIN — GABAPENTIN 100 MG: 100 CAPSULE ORAL at 21:39

## 2024-11-01 NOTE — PROGRESS NOTES
D/He woke up and has normal neuros, VAD numbers and BP are okay for now  0400 BP okay with PI high at this time. He is usually chatty when he is awake  P/will recheck BP and VAD numbers with 6am med  A/flow 2.7, pi was 7 now 8.6, power is 3.2, did drink fluids overnight and BP is 120/80  I/called and unable to connect with intern or resident per phone so paged the oncoming intern  P/monitor for changes, keep him and team updated

## 2024-11-01 NOTE — PROGRESS NOTES
VAD Social Work Services Progress Note    Date of Initial Social Work Evaluation: 09/05/2024   Collaborated with: Patient     Data: Duane received his LVAD on 9/18/24 and transitioned to  ARU and then to TCU. Pt presented to [facility] LVAD clinic with hypotension, dizziness, and new right arm weakness. Stroke code was called to assess symptoms further.     Patient has had a decrease in low flow alarms and has been increasing fluid intake to increase sodium level. Patient beginning VAD re-education to ensure Pt understands his equipment prior to discharge home.    Intervention: SW met with patient for supportive visit and inquired into questions or concerns. Assessed coping. Discussed VAD volunteers continuing to visit.     Assessment: Patient sitting upright in bed. He discussed meeting with VAD coordinator previously to review education. He indicated feeling like it went okay, however, struggled to share additional insight. He shared that there were further tests that needed to occur. He discussed coping appropriately and felt he was going to go on a walk to look outside the window in the hallway. He denied questions for SW at this time.  Education provided by SW: Ongoing SW availability   Plan:    Discharge Plans in Progress: Home with outpatient therapies     Barriers to d/c plan: VAD education    Follow up Plan: SW to continue to follow to support post-VAD psychosocial concerns. RNCC to set up outpatient therapy services.     Jessi Almendarez, MSW, LGSW, APSW  Heart Transplant/[facility]   Te[facility]  Ph. [phone]

## 2024-11-01 NOTE — PROGRESS NOTES
D:  Completed LVAD education refresher with pt. Patient with less than 15% recall of original education.   I:  LVAD education restarted and will continue Monday.  Unsure at this point of exactly how many more sessions pt will need to master material.  Currently, pt unable to manage his VAD independently and will require a 24 hour caregiver until education completed.  Teams and primary coordinator aware.  A:  LVAD education  P:  Please call VAD coordinator with further needs and questions.

## 2024-11-01 NOTE — PLAN OF CARE
AO X 4, denies pain, MAPs 70's and 80's, afebrile, VAD numbers wdl with occasional low PI's with activity, no VAD alarms, patient denies dizziness, neuros WDL, pushing oral intake. BM X 1. Continuing to monitor.    Discharge home 11/2. Walker to be delivered to patient at [facility] before he leaves.    Hydralazine (Q 8 hourss) rescheduled; patient did not take 0800 dose until 1000.

## 2024-11-01 NOTE — PROGRESS NOTES
D: Called patient's wife to check in     I/A: Told patient's wife it sounded like patient may discharge this weekend, reviewed recent changes made to ensure success at home. Reviewed important discharge reminders.   Patient's wife has questions about a walker- directed her to discuss with 6c care team and provided contact information.     P:   Caregiver notified to page on-call coordinator if symptoms worsen or with other concerns. Caregiver verbalized understanding. Reviewed upcoming apts on Monday 11/4.

## 2024-11-01 NOTE — DISCHARGE INSTRUCTIONS
Going home with your VAD    You are about to leave the hospital with your new VAD. This time can feel overwhelming. Your VAD Coordinator team is here to do everything we can to make this transition as smooth as possible. Below are contact numbers and information to help ease the process. A VAD Coordinator is available 24/7 should you have any questions. We would be more than happy to assist you.     Please remember, if you have a true emergency, ALWAYS call 911 first. Then call the on-call VAD Coordinator.     To Reach the On-Call VAD Coordinator: Dial the main hospital number at [phone]. Choose option 4 to speak with the . Ask him or her to page the on-call VAD Coordinator. We should get back to you within five minutes.     Symptoms to Report: Please contact the on-call VAD Coordinator to report:  Bleeding   Dizziness/lightheadedness  Changes in your VAD parameters (+/- 2 from baseline)  VAD alarms  Numbness, tingling, or difficulty moving your arms or legs  Changes in speech, swallowing, or mental status  ANY head injury, even if it is just a small bump  Dark, black, tarry, red, or bloody stools  If you vomit and it is bloody, black, or looks like coffee grounds  Increased drainage, redness, tenderness, or trauma to your driveline site, chest incision, or chest tube sites    Questions about your medications  Questions about your Coumadin or INR  Any other changes or concerns    Dressing Supplies: Your dressing supply company is [employer]. Please call them once you leave the hospital. They can be reached at [phone]. Verify that they have the correct address for delivery, especially if you are staying in the [place] before going home. Reminder to order extra sterile gloves in your size- you were using 8 in hospital, extra tape just in case, adhesive remover, and you could try cath  anchors to see if you like them better than the current anchors you have been using.     Blood  Thinners: Your Coumadin (warfarin) will be managed by the [facility]. They can be reached M-F, 8am-4pm. They will communicate with you regarding your blood draws and your Coumadin dose. If you have an INR drawn and you don t hear from the clinic by 2pm please call them at [phone].  Please never allow anyone else to adjust your Coumadin or Aspirin without talking to a VAD Coordinator.     Clinic Appointments: The VAD team will schedule you for all of your follow-up visits. If you have any questions please call the main VAD office at [phone] to speak with our . You can also contact your VAD Coordinator.     VAD Coordinator: Your VAD Coordinator, Franchesca Haddad, can be reached M-F, 8am-4pm, via [facility], by phone at [phone] or by e-mail at [email or web address] .    If you have any further questions or concerns about your VAD please refer to the discharge folder you received from your VAD Coordinator and/or call the on-call VAD Coordinator.       Upcoming appointment: INR lab in [place] on 11/12/24 at 2:15 PM. Please call the clinic on 11/11/24 to see if there are same-day openings available as this is the preferred date.

## 2024-11-01 NOTE — PROGRESS NOTES
[facility]   Cardiology II Service / Advanced Heart Failure  Daily Progress Note      Patient: Duane C Johnson  MRN: [id number]  Admission Date: 10/23/2024  Hospital Day # 9    Assessment and Plan: Duane C Johnson is a 74 year old male who presents from LVAD clinic with hypotension, dizziness, and new right arm weakness. Doppler MAP in clinic ranging 45-48. In reviewing the TCU chart, MAPS have been ranging from 60-84 with well controled HRs in the 50s-60s. For afterload his hydralazine was decreased yesterday from 100 mg q8h to 75 mg every 8 hours. He is still getting losartan 50 mg BID, although one dose was held yesterday. Last low flow alarm 10/19. He had several prolonged low flow alarms on 10/17 for which hydralazine was increased. CT-A was done on 10/17/24 with no outflow graft ostruction. The outflow graft does make an acute angle as it approaches the aorta. The patient's LVAD has had frequent low flow alarms over the course of his hospitalization with elevated MAPs.    Today's Plan:  - encourage PO intake with non-free water (ie juice, even soda, soups), encourage salty snacks  - monitor MAP  - VAD re-education today with wife    # Acute on chronic systolic heart failure secondary to ICM   # s/p HM3 LVAD as DT on 9/18/2024  # CAD s/p PCI  # History of STEMI  # s/p ICD 5/2024  # Acute angle of outflow fraft  Stage D. NYHA Class III.    10/23/24 RA 6, PA 40/14/20, PCW 9, Goldy CO/CI 4.54/2.59 at 5100 rpm    Fluid status: euvolemic   ACEi/ARB/ARNi: losartan 50 BID  Vasodilator: significant hypotension on 75 mg TID but ?if dehydrated at that time as well. Increased back to hydralazine 75 mg q8hr and amlodipine 10 mg daily - MAP 80s  BB: deferred due to recent VAD  Aldosterone antagonist: STOPPED blane due to hypoNa  SGLT2i: STOPPED empagliflozin 10 mg d/t propensity for hypovolemia  SCD prophylaxis: ICD  MAP: goal 65-90, 80s-90s today  LDH trends: stable 200s  Anticoagulation: warfarin dosing per  pharmacy, INR goal 2-3  Antiplatelet: ASA not indicated in LVAD population, > 6 months s/p STEMI     # Low flow alarms, resolved  # Elevated PIs  # Labile MAPs   # Suspected orthostatic hypotension, resolved  Speed optimization echo performed 10/1, speed decreased to 5100. Low flows seems releated to hypertension and some hypovolemia as well. Have improved with re-timing hydralazine and after IV fluids. CT-A was done on  10/17/24 with no outflow graft ostruction. The outflow graft does make an acute angle as it approaches the aorta, discussed with Dr. Valentine, unlikely to be impacting the low flows at this time.  Having daily low flow alarms, always in the setting of htn. Mostly ~5 am, although has had some in the evening as well. They do not seem to be positional. RHC with reasonable filling pressures as above. Has been asymptomatic.  - Decreased speed to 5000 on 10/27  - Losartan 50 mg BID  - hydralazine and amlodipine as above  - stopped fludrocortisone 0.1 mg daily   - Increase PO intake as above, specifically right before bed, non free-water sources due to hypoNa  - temp drop in hematocrit on monitor 10/29-10/30, low-flow controller change out 10/30, changed hematocrit back 10/31     # History of VT/VF arrest 2023  # AFib s/p DCCV 9/2024 and again 9/30/2024   Successfully cardioverted in early September for AF. Since then EKG suggested AF on 9/21. Tele is only available from as early as 9/22. The patient was started on hep gtt 9/21, but unclear if was in AF before this. While the patient was on hep gtt until INR was therapeutic, it is not possible to assess rhythm prior to 9/21. Systemic AC was off on 9/18-9/20. S/p MELBA and DCCV on 9/30 with conversion to sinus rhythm.  10/23 device interrogation shows persistent episode of AF since 10/10 with rates   - Continue amiodarone 200 mg daily  - Ongoing a fib- given ongoing low flows- deferring cardioversion for now given reasonable rate control. Will discuss  "adding digoxin as well  - AC as above     # Visual changes, resolved.   # Generalized weakness  # Headache  Stoke code called 9/29 for visual changes - right eye with decreased upper half of vision and bluish haze. Resolved after 30 minutes. Seen by opthalmology and neuro. Negative head CT and CTA head and neck. He has had 3 episodes which last about 5 minutes before resolving completely.      On 10/9 Stroke code activated due to concern of generalized weakness, numbness and headache. LKW reported as 0715 when he worked with therapies and shortly after started feeling generally weak. Then around 1500 he had onset of moderate \"tension\" type headache and tingling in the bilateral fingertips. Per RRT URIAH patient now reports tingling has resolved and headache is improving. MAP 48, INR 2.2. Given absence of focal deficits and rapidly improving symptoms, opted to cancel stroke.  CT head negative, though did show chronic maxillary sinusitis.      Stroke code called 10/23 when seen in clinic with report of new RUE weakness. He was notably hypotensive at this time with MAP ~45 with new right pronator drift. Evaluated in ED, CT and CTA head and neck negative. RUE weakness resolved.   - monitor for changes- no current symptoms     # Hypovolemia hyponatremia  Na 124 today from 122  - encourage high sodium PO intake/gatorade and salty foods  - daily BMP    # Blood culture postivie for staph epi. 1/2 bottles. No fevers. No symptoms.  - received one dose IV vanco, stopped given likely contaminant  - repeat blood culture pending 10/25 negative, continue to follow  - monitor    FEN: regular diet  PROPHY:  warfarin  LINES:  PIV  DISPO:  possible discharge tomorrow  CODE STATUS:  Full Code    Time/Communication  I spent 55 minutes reviewing results, care team notes, meeting directly with the patient, coordinating care, and completing documentation on the day of service.     Patient discussed with Dr. Moshe Moreira, CHICO, " "NP-C  Nurse Practitioner - Advanced Heart Failure/Cardiology II Service  [place] preferred or Pager [phone]    ================================================================    Subjective/24-Hr Events:   Last 24 hr care team notes reviewed. No low flow alarms overnight. MAPs 80s today. Has three glasses of water on his table.     ROS:  4 point ROS including respiratory, CV, GI and  (other than that noted in the HPI) is negative.     Medications: Reviewed in EPIC.     Physical Exam:   /81 (BP Location: Right arm)   Pulse 94   Temp 98.1  F (36.7  C) (Oral)   Resp 20   Ht 1.702 m (5' 7\")   Wt 61.3 kg (135 lb 1.6 oz)   SpO2 98%   BMI 21.16 kg/m      GENERAL: Appears comfortable, in no distress.  HEENT: Eye symmetrical, no discharge or icterus bilaterally.   NECK: Supple, JVD at clavicle at 90 degrees.   CV: tachycardic, irregular, +LVAD hum   RESPIRATORY: Respirations regular, even, and unlabored. Lungs CTA throughout.    GI: Soft and non distended. No tenderness. Normal bowel sounds present  EXTREMITIES: No peripheral edema. All extremities are warm and well perfused  NEUROLOGIC: Alert and interacting appropriatly. No focal deficits.   MUSCULOSKELETAL: No joint swelling or tenderness.   SKIN: No jaundice. No rashes or lesions.     Labs:  CMP  Recent Labs   Lab 11/01/24  0522 10/31/24  0533 10/30/24  0554 10/29/24  0547   * 122* 124* 123*   POTASSIUM 4.3 4.1 3.9 4.0   CHLORIDE 92* 91* 91* 92*   CO2 24 22 23 22   ANIONGAP 8 9 10 9   GLC 88 91 87 91   BUN 18.7 15.3 13.9 15.4   CR 0.59* 0.57* 0.60* 0.56*   GFRESTIMATED >90 >90 >90 >90   PRANAV 8.8 8.7* 8.7* 8.6*   PROTTOTAL 6.4 6.2* 6.5 6.1*   ALBUMIN 3.3* 3.2* 3.3* 3.0*   BILITOTAL 0.4 0.5 0.5 0.4   ALKPHOS 120 120 119 117   AST 37 38 41 43   ALT 40 40 42 41       CBC  Recent Labs   Lab 11/01/24  0522 10/30/24  0554 10/28/24  0529 10/26/24  0628   WBC 5.1 5.5 6.1 5.8  5.8   RBC 3.43* 3.42* 3.39* 3.26*  3.26*   HGB 10.3* 10.4* 10.5* 9.9*  9.9* "   HCT 31.6* 31.8* 32.4* 31.1*  31.1*   MCV 92 93 96 95  95   MCH 30.0 30.4 31.0 30.4  30.4   MCHC 32.6 32.7 32.4 31.8  31.8   RDW 16.0* 16.4* 16.6* 16.4*  16.4*    195 190 189  189       INR  Recent Labs   Lab 11/01/24  0522 10/31/24  0533 10/30/24  0554 10/29/24  0547   INR 2.85* 2.51* 2.29* 2.25*

## 2024-11-01 NOTE — PROCEDURES
The patient's HeartMate LVAD was interrogated 11/1/2024  * Speed 5000 rpm   * Pulsatility index 3-5  * Power 3.1 Dahl   * Flow 3.1 L/minute   Fluid status: euvolemic   Alarms were reviewed, and notable for no low flow alarms, clustered PI events this AM.   The driveline exit site was inspected, c/d/i.   All external components were inspected and showed no evidence of damage or malfunction, none replaced.   No changes to VAD settings made

## 2024-11-01 NOTE — PROGRESS NOTES
Care Management Follow Up    Length of Stay (days): 9    Expected Discharge Date: TBD, possibly by 11/5/24     Concerns to be Addressed: basic needs     Patient plan of care discussed at interdisciplinary rounds: Yes    Anticipated Discharge Disposition: Home, Home Care              Anticipated Discharge Services:  4WW DME, HC PT/OT/RN  Anticipated Discharge DME: None    Patient/family educated on Medicare website which has current facility and service quality ratings: no  Education Provided on the Discharge Plan: Yes  Patient/Family in Agreement with the Plan: yes    Referrals Placed by CM/SW: Internal Clinic Care Coordination, Homecare  Private pay costs discussed: Not applicable    Discussed  Partnership in Safe Discharge Planning  document with patient/family: No     Handoff Completed: No, handoff not indicated or clinically appropriate    Additional Information:  RNCC updated this afternoon that patient wouldn't discharge until next week sometime d/t needing VAD re-teaching, also writer had sent for 4WW from [facility], who will come return walker now delivered, call [phone] to have walker delivered when pt ready for discharge.    RNCC updated [facility] HC of discharge plan.     Next Steps: continue to follow for discharge planning.    [facility]  Phone: [phone]  Fax: [phone]    [facility]  [facility] [place] [address]  Phone: [phone]  Fax: [phone]    Vendor to supply--    4 Wheeled Walker    Schedule INR for [facility] when discharge ready.    UPDATE: 4WW to stay in pt room, will need to call [facility] so they can get signature on paperwork prior to discharge. [phone]    Jaylon Aguila, SANDIEN, BA, RN, CMSRN, RNCC  [facility]  Covering Units 6C Beds 7792-4687/OBS  Phone: [phone]  Available on [employer] search 6C [id number] RNCC or OBS RNCC  After Hours [phone]     6C Beds 2761-1659 SW Ph:  [phone]     6B/CRNCC Weekend/holiday on Vocera or [phone]     [facility] RNCC ED/5 Ortho/5 Med/Surg [phone]     [facility] Med/Surg 8A, [phone]     Observation SW and weekend/after hours phone: [phone]

## 2024-11-01 NOTE — PROGRESS NOTES
CLINICAL NUTRITION SERVICES - ASSESSMENT NOTE     Nutrition Prescription    RECOMMENDATIONS FOR MDs/PROVIDERS TO ORDER:  Encourage PO efforts    Malnutrition Status:    Severe malnutrition in the context of chronic illness/disease    Recommendations already ordered by Registered Dietitian (RD):  Encourage at least 2 meals/day with high protein sources  Continue Ensure Enlive with meals  Continue snack of cottage cheese  Continue Thera-vit-M    Future/Additional Recommendations:  Monitor nutrition related findings and follow up per protocol     REASON FOR ASSESSMENT  Duane C Johnson is a/an 74 year old male assessed by the dietitian for LOS    Patient admitted for LVAD clinic with hypotension, dizziness, and new right arm weakness     MEDICAL HISTORY  anterior STEMI s/p PCI to the pLAD on 10/26/23 with a VT/VF arrest the next day (10/27) with patent stents and unchanged anatomy on repeat angiogram. Placed on amiodarone and discharged 11/03/23, ICD was not indicated as this was within 48h of his MI. His EF prior to discharge was 20-30% with a large area of akinesis in the LAD, placed on apixaban due to this (Apixaban dcd on 7/5/24). Has had multiple admissions for HF exacerbation last year.  Admitted on 8/30/24 for CHF exacerbation with BNP 16k. CPX and RHC showed significant functional limitations due to heart failue. Underwent HM3 LVAD on 9/18/24.     NUTRITION HISTORY  Met with pt at bedside. Pt reports good appetite. Denies N/V. Reports intermittent constipation but denies struggling with this currently. Reports typical intake of chicken noodle soup with mashed potatoes, milk, cottage cheese and ~ 2-3 Ensure shakes daily. Discussed importance of PO efforts with adequate protein sources. Pt endorses some muscle fatigue. Denied additional nutrition related questions/concerns at this time.     CURRENT NUTRITION ORDERS  Diet: Regular    Intake/Tolerance: Patient consuming 0-100 % of 1-2 meal(s) daily.    LABS  Na  "124  Cl 92  Cre .59  Lactate dehydrogenase 260  Hgb 10.3  Hematocrit 31.6    MEDICATIONS  Lipitor  Mg oxide  Thera-vit-M  warfarin    ANTHROPOMETRICS  Height: 170.2 cm (5' 7\")  Most Recent Weight: 61.3 kg (135 lb 1.6 oz)    IBW: 67.3 kg  Body mass index is 21.16 kg/m . BMI Category: Normal BMI  Weight History:  3 kg (5%) weight loss over 3 months.  Wt Readings from Last 20 Encounters:   11/01/24 61.3 kg (135 lb 1.6 oz)   10/23/24 62.1 kg (136 lb 14.4 oz)   10/17/24 63.1 kg (139 lb 1.8 oz)   10/05/24 66.2 kg (145 lb 14.4 oz)   08/26/24 64.4 kg (142 lb)   08/08/24 64.3 kg (141 lb 12.8 oz)   07/05/24 59.9 kg (132 lb 1.6 oz)   06/26/24 63.3 kg (139 lb 9.6 oz)   06/19/24 59.9 kg (132 lb)   06/12/24 63.5 kg (140 lb)   06/11/24 62.9 kg (138 lb 9.6 oz)   06/05/24 62.6 kg (138 lb)   05/22/24 61.5 kg (135 lb 9.6 oz)   05/20/24 60.1 kg (132 lb 9.6 oz)   05/15/24 63.2 kg (139 lb 6.4 oz)   05/13/24 60.2 kg (132 lb 11.2 oz)   05/10/24 60.9 kg (134 lb 3.2 oz)   05/08/24 61.7 kg (136 lb 0.4 oz)   05/06/24 61.9 kg (136 lb 7.4 oz)   05/06/24 61.2 kg (135 lb)         ASSESSED NUTRITION NEEDS  Dosing Weight: 61.3 kg (Actual BW)   Estimated Energy Needs: 3790-2381 kcals/day (25 - 35 kcal/kg)  Justification: Maintenance  Estimated Protein Needs: 61-74 grams protein/day (1 - 1.2 grams of pro/kg)  Justification: Increased needs  Estimated Fluid Needs:  (1 mL/kcal)   Justification: Maintenance    PHYSICAL FINDINGS  See malnutrition section below.    Mehdi Score: 22  Per EMR: Skin  Skin WDL: .WDL except  Skin Color: pale  Redness blanchable location: Perineum  Skin Elasticity: quick return to original state  Skin Integrity: bruised (ecchymotic)  Abrasion / Excoriation: Right, Left, Shin  Bruised (ecchymotic) location: Right, Left, Fore Arm, Upper Arm  Peeling: Right, Left, Heel  Scab: Chest  Other (see comments): midsternal inc, old CT sites, Driveline site  Device Skin Interventions Taken: No adjustments needed    MALNUTRITION  % Intake: " Decreased intake does not meet criteria  % Weight Loss: Weight loss does not meet criteria for malnutrition  -- cannot rule out confounded by fluid status  Subcutaneous Fat Loss: Facial region:  severe   Muscle Loss: Thoracic region (clavicle, acromium bone, deltoid, trapezius, pectoral):  severe  Fluid Accumulation/Edema: Does not meet criteria  Malnutrition Diagnosis: Severe malnutrition in the context of chronic illness/disease    NUTRITION DIAGNOSIS  Inadequate oral intake related to unclear etiology as evidenced by Patient consuming 0-100 % of 1-2 meal(s) daily with additional supplements      INTERVENTIONS  Implementation  Nutrition Education: will be provided if nutrition education needs arise.  and Nutrition Education: Introduced role of RD   Medical food supplement therapy  Modify composition of meals/snacks     Goals  Patient to consume % of nutritionally adequate meal trays TID, or the equivalent with supplements/snacks.        Monitoring/Evaluation  Progress toward goals will be monitored and evaluated per protocol.  Malina Suresh MS, RD, LD, CNSC    6C (beds 9275-0868) + 7C (beds 7834-5446) + ED   Available in [facility] by name or unit dietitian

## 2024-11-02 LAB
ALBUMIN SERPL BCG-MCNC: 3.4 G/DL (ref 3.5–5.2)
ALP SERPL-CCNC: 132 U/L (ref 40–150)
ALT SERPL W P-5'-P-CCNC: 40 U/L (ref 0–70)
ANION GAP SERPL CALCULATED.3IONS-SCNC: 9 MMOL/L (ref 7–15)
AST SERPL W P-5'-P-CCNC: 38 U/L (ref 0–45)
BILIRUB SERPL-MCNC: 0.4 MG/DL
BUN SERPL-MCNC: 23.1 MG/DL (ref 8–23)
CALCIUM SERPL-MCNC: 8.8 MG/DL (ref 8.8–10.4)
CHLORIDE SERPL-SCNC: 90 MMOL/L (ref 98–107)
CREAT SERPL-MCNC: 0.66 MG/DL (ref 0.67–1.17)
EGFRCR SERPLBLD CKD-EPI 2021: >90 ML/MIN/1.73M2
GLUCOSE SERPL-MCNC: 90 MG/DL (ref 70–99)
HCO3 SERPL-SCNC: 24 MMOL/L (ref 22–29)
HOLD SPECIMEN: NORMAL
INR PPP: 2.76 (ref 0.85–1.15)
LDH SERPL L TO P-CCNC: 255 U/L (ref 0–250)
POTASSIUM SERPL-SCNC: 4.7 MMOL/L (ref 3.4–5.3)
PROT SERPL-MCNC: 6.7 G/DL (ref 6.4–8.3)
SODIUM SERPL-SCNC: 123 MMOL/L (ref 135–145)

## 2024-11-02 PROCEDURE — 250N000013 HC RX MED GY IP 250 OP 250 PS 637: Performed by: NURSE PRACTITIONER

## 2024-11-02 PROCEDURE — 36415 COLL VENOUS BLD VENIPUNCTURE: CPT | Performed by: NURSE PRACTITIONER

## 2024-11-02 PROCEDURE — 99232 SBSQ HOSP IP/OBS MODERATE 35: CPT | Mod: 25 | Performed by: NURSE PRACTITIONER

## 2024-11-02 PROCEDURE — 250N000013 HC RX MED GY IP 250 OP 250 PS 637: Performed by: INTERNAL MEDICINE

## 2024-11-02 PROCEDURE — 93750 INTERROGATION VAD IN PERSON: CPT | Performed by: NURSE PRACTITIONER

## 2024-11-02 PROCEDURE — 83615 LACTATE (LD) (LDH) ENZYME: CPT | Performed by: PHYSICIAN ASSISTANT

## 2024-11-02 PROCEDURE — 120N000003 HC R&B IMCU UMMC

## 2024-11-02 PROCEDURE — 84075 ASSAY ALKALINE PHOSPHATASE: CPT | Performed by: PHYSICIAN ASSISTANT

## 2024-11-02 PROCEDURE — 82247 BILIRUBIN TOTAL: CPT | Performed by: PHYSICIAN ASSISTANT

## 2024-11-02 PROCEDURE — 85610 PROTHROMBIN TIME: CPT | Performed by: NURSE PRACTITIONER

## 2024-11-02 PROCEDURE — 80053 COMPREHEN METABOLIC PANEL: CPT | Performed by: PHYSICIAN ASSISTANT

## 2024-11-02 RX ORDER — WARFARIN SODIUM 2.5 MG/1
2.5 TABLET ORAL
Status: COMPLETED | OUTPATIENT
Start: 2024-11-02 | End: 2024-11-02

## 2024-11-02 RX ADMIN — HYDRALAZINE HYDROCHLORIDE 75 MG: 25 TABLET ORAL at 21:21

## 2024-11-02 RX ADMIN — HYDRALAZINE HYDROCHLORIDE 75 MG: 25 TABLET ORAL at 06:42

## 2024-11-02 RX ADMIN — ESCITALOPRAM OXALATE 10 MG: 10 TABLET ORAL at 07:46

## 2024-11-02 RX ADMIN — AMLODIPINE BESYLATE 10 MG: 10 TABLET ORAL at 07:46

## 2024-11-02 RX ADMIN — WARFARIN SODIUM 2.5 MG: 2.5 TABLET ORAL at 18:12

## 2024-11-02 RX ADMIN — ATORVASTATIN CALCIUM 80 MG: 80 TABLET, FILM COATED ORAL at 19:11

## 2024-11-02 RX ADMIN — GABAPENTIN 100 MG: 100 CAPSULE ORAL at 21:21

## 2024-11-02 RX ADMIN — MAGNESIUM OXIDE TAB 400 MG (241.3 MG ELEMENTAL MG) 400 MG: 400 (241.3 MG) TAB at 07:46

## 2024-11-02 RX ADMIN — LOSARTAN POTASSIUM 50 MG: 50 TABLET, FILM COATED ORAL at 07:46

## 2024-11-02 RX ADMIN — LOSARTAN POTASSIUM 50 MG: 50 TABLET, FILM COATED ORAL at 19:11

## 2024-11-02 RX ADMIN — Medication 1 TABLET: at 07:46

## 2024-11-02 RX ADMIN — AMIODARONE HYDROCHLORIDE 200 MG: 200 TABLET ORAL at 07:46

## 2024-11-02 NOTE — PROGRESS NOTES
"While walking around unit with visitors, patient endorsed dizziness and sat down on his chair-walker. Patient asked if he could go with visitors up to the 8th floor to \"look out the windows\". This RN advised him to stay on this unit while feeling dizzy. MAP 69  "

## 2024-11-02 NOTE — PROGRESS NOTES
[facility]   Cardiology II Service / Advanced Heart Failure  Daily Progress Note      Patient: Duane C Johnson  MRN: [id number]  Admission Date: 10/23/2024  Hospital Day # 10    Assessment and Plan: Duane C Johnson is a 74 year old male who presents from LVAD clinic with hypotension, dizziness, and new right arm weakness. Doppler MAP in clinic ranging 45-48. In reviewing the TCU chart, MAPS have been ranging from 60-84 with well controled HRs in the 50s-60s. For afterload his hydralazine was decreased yesterday from 100 mg q8h to 75 mg every 8 hours. He is still getting losartan 50 mg BID, although one dose was held yesterday. Last low flow alarm 10/19. He had several prolonged low flow alarms on 10/17 for which hydralazine was increased. CT-A was done on 10/17/24 with no outflow graft ostruction. The outflow graft does make an acute angle as it approaches the aorta. The patient's LVAD has had frequent low flow alarms over the course of his hospitalization with elevated MAPs.    Today's Plan:  - encourage PO intake with non-free water (ie juice, even soda, soups), encourage salty snacks  - monitor MAP  - VAD re-education to continue Monday 11/4    # Acute on chronic systolic heart failure secondary to ICM   # s/p HM3 LVAD as DT on 9/18/2024  # CAD s/p PCI  # History of STEMI  # s/p ICD 5/2024  # Acute angle of outflow fraft  Stage D. NYHA Class III.    10/23/24 RA 6, PA 40/14/20, PCW 9, Goldy CO/CI 4.54/2.59 at 5100 rpm    Fluid status: euvolemic   ACEi/ARB/ARNi: losartan 50 BID  Vasodilator: significant hypotension on 75 mg TID but ?if dehydrated at that time as well. Increased back to hydralazine 75 mg q8hr and amlodipine 10 mg daily - MAP 80s  BB: deferred due to recent VAD  Aldosterone antagonist: STOPPED blane due to hypoNa  SGLT2i: STOPPED empagliflozin 10 mg d/t propensity for hypovolemia  SCD prophylaxis: ICD  MAP: goal 65-90, 80s-90s today  LDH trends: stable 200s  Anticoagulation: warfarin  dosing per pharmacy, INR goal 2-3  Antiplatelet: ASA not indicated in LVAD population, > 6 months s/p STEMI     # Low flow alarms, resolved  # Elevated PIs  # Labile MAPs   # Suspected orthostatic hypotension, resolved  Speed optimization echo performed 10/1, speed decreased to 5100. Low flows seems releated to hypertension and some hypovolemia as well. Have improved with re-timing hydralazine and after IV fluids. CT-A was done on  10/17/24 with no outflow graft ostruction. The outflow graft does make an acute angle as it approaches the aorta, discussed with Dr. Valentine, unlikely to be impacting the low flows at this time.  Having daily low flow alarms, always in the setting of htn. Mostly ~5 am, although has had some in the evening as well. They do not seem to be positional. RHC with reasonable filling pressures as above. Has been asymptomatic.  - Decreased speed to 5000 on 10/27  - Losartan 50 mg BID  - hydralazine and amlodipine as above  - stopped fludrocortisone 0.1 mg daily   - Increase PO intake as above, specifically right before bed, non free-water sources due to hypoNa  - temp drop in hematocrit on monitor 10/29-10/30, low-flow controller change out 10/30, changed hematocrit back 10/31     # History of VT/VF arrest 2023  # AFib s/p DCCV 9/2024 and again 9/30/2024   Successfully cardioverted in early September for AF. Since then EKG suggested AF on 9/21. Tele is only available from as early as 9/22. The patient was started on hep gtt 9/21, but unclear if was in AF before this. While the patient was on hep gtt until INR was therapeutic, it is not possible to assess rhythm prior to 9/21. Systemic AC was off on 9/18-9/20. S/p MELBA and DCCV on 9/30 with conversion to sinus rhythm.  10/23 device interrogation shows persistent episode of AF since 10/10 with rates   - Continue amiodarone 200 mg daily  - Ongoing a fib- given ongoing low flows- deferring cardioversion for now given reasonable rate control. Will  "discuss adding digoxin as well  - AC as above     # Visual changes, resolved.   # Generalized weakness  # Headache  Stoke code called 9/29 for visual changes - right eye with decreased upper half of vision and bluish haze. Resolved after 30 minutes. Seen by opthalmology and neuro. Negative head CT and CTA head and neck. He has had 3 episodes which last about 5 minutes before resolving completely.      On 10/9 Stroke code activated due to concern of generalized weakness, numbness and headache. LKW reported as 0715 when he worked with therapies and shortly after started feeling generally weak. Then around 1500 he had onset of moderate \"tension\" type headache and tingling in the bilateral fingertips. Per RRT URIAH patient now reports tingling has resolved and headache is improving. MAP 48, INR 2.2. Given absence of focal deficits and rapidly improving symptoms, opted to cancel stroke.  CT head negative, though did show chronic maxillary sinusitis.      Stroke code called 10/23 when seen in clinic with report of new RUE weakness. He was notably hypotensive at this time with MAP ~45 with new right pronator drift. Evaluated in ED, CT and CTA head and neck negative. RUE weakness resolved.   - monitor for changes- no current symptoms     # Hypovolemia hyponatremia  Na 123  - encourage high sodium PO intake/gatorade and salty foods  - daily BMP    # Blood culture postivie for staph epi. 1/2 bottles. No fevers. No symptoms.  - received one dose IV vanco, stopped given likely contaminant  - repeat blood culture pending 10/25 negative, continue to follow  - monitor    FEN: regular diet  PROPHY:  warfarin  LINES:  PIV  DISPO:  2-3 days pending VAD education   CODE STATUS:  Full Code    Time/Communication  I spent 40 minutes reviewing results, care team notes, meeting directly with the patient, coordinating care, and completing documentation on the day of service.     Patient discussed with Dr. Moshe Arias, [place], " "NP-C  Nurse Practitioner - Advanced Heart Failure/Cardiology II Service  [place] preferred or Pager [phone]    ================================================================    Subjective/24-Hr Events:   Last 24 hr care team notes reviewed. No low flow alarms overnight. MAPs 80s today. Frustrated that he's not able to discharge today. Discussed need for ongoing VAD education as Duane demonstrated that he is still unable to independently manage his VAD.     ROS:  4 point ROS including respiratory, CV, GI and  (other than that noted in the HPI) is negative.     Medications: Reviewed in EPIC.     Physical Exam:   /88   Pulse 97   Temp 97.6  F (36.4  C) (Oral)   Resp 16   Ht 1.702 m (5' 7\")   Wt 60.4 kg (133 lb 3.2 oz)   SpO2 97%   BMI 20.86 kg/m      GENERAL: Appears comfortable, in no distress.  HEENT: Eye symmetrical, no discharge or icterus bilaterally.   NECK: Supple, JVD at clavicle at 90 degrees.   CV: tachycardic, irregular, +LVAD hum   RESPIRATORY: Respirations regular, even, and unlabored. Lungs CTA throughout.    GI: Soft and non distended. No tenderness. Normal bowel sounds present  EXTREMITIES: No peripheral edema. All extremities are warm and well perfused  NEUROLOGIC: Alert and interacting appropriatly. No focal deficits.   MUSCULOSKELETAL: No joint swelling or tenderness.   SKIN: No jaundice. No rashes or lesions.     Labs:  CMP  Recent Labs   Lab 11/02/24  0723 11/01/24  0522 10/31/24  0533 10/30/24  0554   * 124* 122* 124*   POTASSIUM 4.7 4.3 4.1 3.9   CHLORIDE 90* 92* 91* 91*   CO2 24 24 22 23   ANIONGAP 9 8 9 10   GLC 90 88 91 87   BUN 23.1* 18.7 15.3 13.9   CR 0.66* 0.59* 0.57* 0.60*   GFRESTIMATED >90 >90 >90 >90   PRANAV 8.8 8.8 8.7* 8.7*   PROTTOTAL 6.7 6.4 6.2* 6.5   ALBUMIN 3.4* 3.3* 3.2* 3.3*   BILITOTAL 0.4 0.4 0.5 0.5   ALKPHOS 132 120 120 119   AST 38 37 38 41   ALT 40 40 40 42       CBC  Recent Labs   Lab 11/01/24  0522 10/30/24  0554 10/28/24  0529   WBC 5.1 5.5 " 6.1   RBC 3.43* 3.42* 3.39*   HGB 10.3* 10.4* 10.5*   HCT 31.6* 31.8* 32.4*   MCV 92 93 96   MCH 30.0 30.4 31.0   MCHC 32.6 32.7 32.4   RDW 16.0* 16.4* 16.6*    195 190       INR  Recent Labs   Lab 11/01/24  0522 10/31/24  0533 10/30/24  0554 10/29/24  0547   INR 2.85* 2.51* 2.29* 2.25*

## 2024-11-02 NOTE — PROCEDURES
The patient's HeartMate LVAD was interrogated 11/2/2024  * Speed 5000 rpm   * Pulsatility index 2.0   * Power 3.3 Dahl   * Flow 4.1 L/minute   Fluid status: euvolemic   Alarms were reviewed, and notable for rare PI events, no power spikes, speed drops, or other findings suspicious of pump malfunction noted.   The driveline exit site was inspected, c/d/i.   All external components were inspected and showed no evidence of damage or malfunction, none replaced.   No changes to VAD settings made

## 2024-11-02 NOTE — PLAN OF CARE
Goal Outcome Evaluation:    NURSING PROGRESS NOTE  Shift Summary      Date: November 2, 2024     Neuro/Musculoskeletal:  A&Ox4.Able to make needs known, afebrile  Cardiac:  A-Flutter/A-Fib.  VSS.     Respiratory:  Sating in the 90s on RA .  GI/:  Adequate urine output.  LBM:11/1  Diet/Appetite:  Tolerating regular diet.  Activity:  Independent  Pain:  Denies any pain  Skin:  No new deficits noted.   LDAs + Drips/IVF: R+L PIV   Protocols/Labs:    None  Pertinent Shift Updates:    Pt stable, no significant changes noted overnight     Plan:    Possible discharge today    Petra Shrestha RN  .................................................... November 2, 2024   2:34 AM  [facility] ([facility]): [place]  Stepdown ICU (Unit 6C)

## 2024-11-02 NOTE — PLAN OF CARE
Goal Outcome Evaluation:    I/A: A/Ox4, neuros intact q4h. VSS on RA. Aflutter 80s-100s. Doppler MAPs 70-80. LVAD #'s WNL, no alarms noted. Dressing changed. Denies pain. Good PO. Adequate UOP. LBM today. Ambulated becker x2 this shift.     P: Possible discharge to home tomorrow.     Hours of care: 0786-9012

## 2024-11-02 NOTE — PLAN OF CARE
AO X 4, afib/aflutter with rate control, HR 80's, MAPs 70's and 80's, room air, denies pain, last BM 11/1, adequate UOP, poor appetite, encouraging oral intake of fluids, VAD numbers WDL, no VAD alarms triggered. Neuros unremarkable. Patient walking independently to bathroom and around unit with walker. Possible discharge home Monday after VAD education with wife. VAD coordinator to contact patient's wife to arrange. Family visited during shift.

## 2024-11-03 ENCOUNTER — APPOINTMENT (OUTPATIENT)
Dept: OCCUPATIONAL THERAPY | Facility: CLINIC | Age: 74
DRG: 286 | End: 2024-11-03
Payer: MEDICARE

## 2024-11-03 LAB
ALBUMIN SERPL BCG-MCNC: 3.4 G/DL (ref 3.5–5.2)
ALP SERPL-CCNC: 128 U/L (ref 40–150)
ALT SERPL W P-5'-P-CCNC: 45 U/L (ref 0–70)
ANION GAP SERPL CALCULATED.3IONS-SCNC: 10 MMOL/L (ref 7–15)
AST SERPL W P-5'-P-CCNC: 42 U/L (ref 0–45)
BILIRUB SERPL-MCNC: 0.4 MG/DL
BUN SERPL-MCNC: 27.8 MG/DL (ref 8–23)
CALCIUM SERPL-MCNC: 9 MG/DL (ref 8.8–10.4)
CHLORIDE SERPL-SCNC: 92 MMOL/L (ref 98–107)
CREAT SERPL-MCNC: 0.67 MG/DL (ref 0.67–1.17)
EGFRCR SERPLBLD CKD-EPI 2021: >90 ML/MIN/1.73M2
ERYTHROCYTE [DISTWIDTH] IN BLOOD BY AUTOMATED COUNT: 16 % (ref 10–15)
GLUCOSE SERPL-MCNC: 86 MG/DL (ref 70–99)
HCO3 SERPL-SCNC: 24 MMOL/L (ref 22–29)
HCT VFR BLD AUTO: 31.9 % (ref 40–53)
HGB BLD-MCNC: 10.4 G/DL (ref 13.3–17.7)
INR PPP: 3.03 (ref 0.85–1.15)
LDH SERPL L TO P-CCNC: 247 U/L (ref 0–250)
MCH RBC QN AUTO: 30.2 PG (ref 26.5–33)
MCHC RBC AUTO-ENTMCNC: 32.6 G/DL (ref 31.5–36.5)
MCV RBC AUTO: 93 FL (ref 78–100)
PLATELET # BLD AUTO: 168 10E3/UL (ref 150–450)
POTASSIUM SERPL-SCNC: 4.8 MMOL/L (ref 3.4–5.3)
PROT SERPL-MCNC: 6.4 G/DL (ref 6.4–8.3)
RBC # BLD AUTO: 3.44 10E6/UL (ref 4.4–5.9)
SODIUM SERPL-SCNC: 126 MMOL/L (ref 135–145)
WBC # BLD AUTO: 6.4 10E3/UL (ref 4–11)

## 2024-11-03 PROCEDURE — 83615 LACTATE (LD) (LDH) ENZYME: CPT | Performed by: PHYSICIAN ASSISTANT

## 2024-11-03 PROCEDURE — 85014 HEMATOCRIT: CPT | Performed by: NURSE PRACTITIONER

## 2024-11-03 PROCEDURE — 36415 COLL VENOUS BLD VENIPUNCTURE: CPT | Performed by: PHYSICIAN ASSISTANT

## 2024-11-03 PROCEDURE — 120N000003 HC R&B IMCU UMMC

## 2024-11-03 PROCEDURE — 250N000013 HC RX MED GY IP 250 OP 250 PS 637: Performed by: INTERNAL MEDICINE

## 2024-11-03 PROCEDURE — 80051 ELECTROLYTE PANEL: CPT | Performed by: PHYSICIAN ASSISTANT

## 2024-11-03 PROCEDURE — 250N000013 HC RX MED GY IP 250 OP 250 PS 637: Performed by: NURSE PRACTITIONER

## 2024-11-03 PROCEDURE — 93750 INTERROGATION VAD IN PERSON: CPT | Performed by: NURSE PRACTITIONER

## 2024-11-03 PROCEDURE — 99232 SBSQ HOSP IP/OBS MODERATE 35: CPT | Mod: 25 | Performed by: NURSE PRACTITIONER

## 2024-11-03 PROCEDURE — 85610 PROTHROMBIN TIME: CPT | Performed by: NURSE PRACTITIONER

## 2024-11-03 PROCEDURE — 97535 SELF CARE MNGMENT TRAINING: CPT | Mod: GO

## 2024-11-03 PROCEDURE — 250N000013 HC RX MED GY IP 250 OP 250 PS 637

## 2024-11-03 PROCEDURE — 82565 ASSAY OF CREATININE: CPT | Performed by: PHYSICIAN ASSISTANT

## 2024-11-03 PROCEDURE — 82947 ASSAY GLUCOSE BLOOD QUANT: CPT | Performed by: PHYSICIAN ASSISTANT

## 2024-11-03 RX ORDER — WARFARIN SODIUM 2 MG/1
2 TABLET ORAL
Status: COMPLETED | OUTPATIENT
Start: 2024-11-03 | End: 2024-11-03

## 2024-11-03 RX ORDER — HYDRALAZINE HYDROCHLORIDE 50 MG/1
50 TABLET, FILM COATED ORAL ONCE
Status: COMPLETED | OUTPATIENT
Start: 2024-11-03 | End: 2024-11-03

## 2024-11-03 RX ADMIN — GABAPENTIN 100 MG: 100 CAPSULE ORAL at 21:25

## 2024-11-03 RX ADMIN — HYDRALAZINE HYDROCHLORIDE 75 MG: 25 TABLET ORAL at 13:33

## 2024-11-03 RX ADMIN — LOSARTAN POTASSIUM 50 MG: 50 TABLET, FILM COATED ORAL at 07:45

## 2024-11-03 RX ADMIN — ATORVASTATIN CALCIUM 80 MG: 80 TABLET, FILM COATED ORAL at 20:21

## 2024-11-03 RX ADMIN — WARFARIN SODIUM 2 MG: 2 TABLET ORAL at 17:26

## 2024-11-03 RX ADMIN — AMLODIPINE BESYLATE 10 MG: 10 TABLET ORAL at 07:46

## 2024-11-03 RX ADMIN — Medication 1 TABLET: at 07:45

## 2024-11-03 RX ADMIN — AMIODARONE HYDROCHLORIDE 200 MG: 200 TABLET ORAL at 07:45

## 2024-11-03 RX ADMIN — HYDRALAZINE HYDROCHLORIDE 75 MG: 25 TABLET ORAL at 06:18

## 2024-11-03 RX ADMIN — HYDRALAZINE HYDROCHLORIDE 50 MG: 50 TABLET ORAL at 21:51

## 2024-11-03 RX ADMIN — ESCITALOPRAM OXALATE 10 MG: 10 TABLET ORAL at 07:46

## 2024-11-03 RX ADMIN — MAGNESIUM OXIDE TAB 400 MG (241.3 MG ELEMENTAL MG) 400 MG: 400 (241.3 MG) TAB at 07:45

## 2024-11-03 NOTE — PLAN OF CARE
Patient pleasant and talkative this AM, neuros WDL, independently up with rolling walker to bathroom and to walk the halls, VAD numbers WDL, 97% on room air, denies pain. BM X 1. Discharge home with wife next week after vad education.    Continuing to monitor, encourage ambulation and oral intake.

## 2024-11-03 NOTE — PROGRESS NOTES
[facility]   Cardiology II Service / Advanced Heart Failure  Daily Progress Note      Patient: Duane C Johnson  MRN: [id number]  Admission Date: 10/23/2024  Hospital Day # 11    Assessment and Plan: Duane C Johnson is a 74 year old male who presents from LVAD clinic with hypotension, dizziness, and new right arm weakness. Doppler MAP in clinic ranging 45-48. In reviewing the TCU chart, MAPS have been ranging from 60-84 with well controled HRs in the 50s-60s. For afterload his hydralazine was decreased yesterday from 100 mg q8h to 75 mg every 8 hours. He is still getting losartan 50 mg BID, although one dose was held yesterday. Last low flow alarm 10/19. He had several prolonged low flow alarms on 10/17 for which hydralazine was increased. CT-A was done on 10/17/24 with no outflow graft ostruction. The outflow graft does make an acute angle as it approaches the aorta. The patient's LVAD has had frequent low flow alarms over the course of his hospitalization with elevated MAPs.    Today's Plan:  - encourage PO intake with non-free water (ie juice, even soda, soups), encourage salty snacks  - monitor MAP  - VAD re-education to continue Monday 11/4    # Acute on chronic systolic heart failure secondary to ICM   # s/p HM3 LVAD as DT on 9/18/2024  # CAD s/p PCI  # History of STEMI  # s/p ICD 5/2024  # Acute angle of outflow fraft  Stage D. NYHA Class III.    10/23/24 RA 6, PA 40/14/20, PCW 9, Goldy CO/CI 4.54/2.59 at 5100 rpm    Fluid status: euvolemic   ACEi/ARB/ARNi: losartan 50 BID  Vasodilator: significant hypotension on 75 mg TID but ?if dehydrated at that time as well. Increased back to hydralazine 75 mg q8hr and amlodipine 10 mg daily - MAP 80s  BB: deferred due to recent VAD  Aldosterone antagonist: STOPPED blane due to hypoNa  SGLT2i: STOPPED empagliflozin 10 mg d/t propensity for hypovolemia  SCD prophylaxis: ICD  MAP: goal 65-90, 80s-90s today  LDH trends: stable 200s  Anticoagulation: warfarin  dosing per pharmacy, INR goal 2-3  Antiplatelet: ASA not indicated in LVAD population, > 6 months s/p STEMI     # Low flow alarms, resolved  # Elevated PIs  # Labile MAPs   # Suspected orthostatic hypotension, resolved  Speed optimization echo performed 10/1, speed decreased to 5100. Low flows seems releated to hypertension and some hypovolemia as well. Have improved with re-timing hydralazine and after IV fluids. CT-A was done on  10/17/24 with no outflow graft ostruction. The outflow graft does make an acute angle as it approaches the aorta, discussed with Dr. Valentine, unlikely to be impacting the low flows at this time.  Having daily low flow alarms, always in the setting of htn. Mostly ~5 am, although has had some in the evening as well. They do not seem to be positional. RHC with reasonable filling pressures as above. Has been asymptomatic.  - Decreased speed to 5000 on 10/27  - Losartan 50 mg BID  - hydralazine and amlodipine as above  - stopped fludrocortisone 0.1 mg daily   - Increase PO intake as above, specifically right before bed, non free-water sources due to hypoNa  - temp drop in hematocrit on monitor 10/29-10/30, low-flow controller change out 10/30, changed hematocrit back 10/31     # History of VT/VF arrest 2023  # AFib s/p DCCV 9/2024 and again 9/30/2024   Successfully cardioverted in early September for AF. Since then EKG suggested AF on 9/21. Tele is only available from as early as 9/22. The patient was started on hep gtt 9/21, but unclear if was in AF before this. While the patient was on hep gtt until INR was therapeutic, it is not possible to assess rhythm prior to 9/21. Systemic AC was off on 9/18-9/20. S/p MELBA and DCCV on 9/30 with conversion to sinus rhythm.  10/23 device interrogation shows persistent episode of AF since 10/10 with rates   - Continue amiodarone 200 mg daily  - Ongoing a fib- given ongoing low flows- deferring cardioversion for now given reasonable rate control. Will  "discuss adding digoxin as well  - AC as above     # Visual changes, resolved.   # Generalized weakness  # Headache  Stoke code called 9/29 for visual changes - right eye with decreased upper half of vision and bluish haze. Resolved after 30 minutes. Seen by opthalmology and neuro. Negative head CT and CTA head and neck. He has had 3 episodes which last about 5 minutes before resolving completely.      On 10/9 Stroke code activated due to concern of generalized weakness, numbness and headache. LKW reported as 0715 when he worked with therapies and shortly after started feeling generally weak. Then around 1500 he had onset of moderate \"tension\" type headache and tingling in the bilateral fingertips. Per RRT URIAH patient now reports tingling has resolved and headache is improving. MAP 48, INR 2.2. Given absence of focal deficits and rapidly improving symptoms, opted to cancel stroke.  CT head negative, though did show chronic maxillary sinusitis.      Stroke code called 10/23 when seen in clinic with report of new RUE weakness. He was notably hypotensive at this time with MAP ~45 with new right pronator drift. Evaluated in ED, CT and CTA head and neck negative. RUE weakness resolved.   - monitor for changes- no current symptoms     # Hypovolemia hyponatremia  Na 126, improving   - encourage high sodium PO intake/gatorade and salty foods  - daily BMP    # Blood culture postivie for staph epi. 1/2 bottles. No fevers. No symptoms.  - received one dose IV vanco, stopped given likely contaminant  - repeat blood culture pending 10/25 negative, continue to follow  - monitor    FEN: regular diet  PROPHY:  warfarin  LINES:  PIV  DISPO:  2-3 days pending VAD education   CODE STATUS:  Full Code    Time/Communication  I spent 40 minutes reviewing results, care team notes, meeting directly with the patient, coordinating care, and completing documentation on the day of service.     Patient discussed with Dr. Moshe Arias, " "DNP, NP-C  Nurse Practitioner - Advanced Heart Failure/Cardiology II Service  [place] preferred or Pager [phone]    ================================================================    Subjective/24-Hr Events:   Last 24 hr care team notes reviewed. No low flow alarms. MAPs 60s-70s today. Frustrated that he's not able to discharge today. Discussed need for ongoing VAD education as Duane demonstrated that he is still unable to independently manage his VAD. Walking independently with walker. Denies SOB, DIOP, dizziness.     ROS:  4 point ROS including respiratory, CV, GI and  (other than that noted in the HPI) is negative.     Medications: Reviewed in EPIC.     Physical Exam:   /88   Pulse 95   Temp 97.8  F (36.6  C)   Resp 16   Ht 1.702 m (5' 7\")   Wt 60.6 kg (133 lb 8 oz)   SpO2 97%   BMI 20.91 kg/m      GENERAL: Appears comfortable, in no distress.  HEENT: Eye symmetrical, no discharge or icterus bilaterally.   NECK: Supple, JVD at clavicle at 90 degrees.   CV: regular rate, irregular, +LVAD hum   RESPIRATORY: Respirations regular, even, and unlabored. Lungs CTA throughout.    GI: Soft and non distended. No tenderness. Normal bowel sounds present  EXTREMITIES: No peripheral edema. All extremities are warm and well perfused  NEUROLOGIC: Alert and interacting appropriatly. No focal deficits.   MUSCULOSKELETAL: No joint swelling or tenderness.   SKIN: No jaundice. No rashes or lesions.     Labs:  CMP  Recent Labs   Lab 11/03/24  0539 11/02/24  0723 11/01/24  0522 10/31/24  0533   * 123* 124* 122*   POTASSIUM 4.8 4.7 4.3 4.1   CHLORIDE 92* 90* 92* 91*   CO2 24 24 24 22   ANIONGAP 10 9 8 9   GLC 86 90 88 91   BUN 27.8* 23.1* 18.7 15.3   CR 0.67 0.66* 0.59* 0.57*   GFRESTIMATED >90 >90 >90 >90   PRANAV 9.0 8.8 8.8 8.7*   PROTTOTAL 6.4 6.7 6.4 6.2*   ALBUMIN 3.4* 3.4* 3.3* 3.2*   BILITOTAL 0.4 0.4 0.4 0.5   ALKPHOS 128 132 120 120   AST 42 38 37 38   ALT 45 40 40 40       CBC  Recent Labs   Lab " 11/03/24  0539 11/01/24  0522 10/30/24  0554 10/28/24  0529   WBC 6.4 5.1 5.5 6.1   RBC 3.44* 3.43* 3.42* 3.39*   HGB 10.4* 10.3* 10.4* 10.5*   HCT 31.9* 31.6* 31.8* 32.4*   MCV 93 92 93 96   MCH 30.2 30.0 30.4 31.0   MCHC 32.6 32.6 32.7 32.4   RDW 16.0* 16.0* 16.4* 16.6*    175 195 190       INR  Recent Labs   Lab 11/03/24  0539 11/02/24  0723 11/01/24  0522 10/31/24  0533   INR 3.03* 2.76* 2.85* 2.51*

## 2024-11-03 NOTE — PLAN OF CARE
Goal Outcome Evaluation:     I/A: A/Ox4, neuros intact q4h. VSS on RA. Aflutter 80s-90s. Doppler MAPs 68-82. LVAD #'s WNL, no alarms noted. Dressing changed. Denies pain. Good PO. Adequate UOP. LBM 11/1. Ambulated halls independently. Writer went over some LVAD edu with pt.     P: Possible discharge to home next week pending LVAD education.      Hours of care: [id number]

## 2024-11-03 NOTE — PROCEDURES
The patient's HeartMate LVAD was interrogated 11/3/2024  * Speed 5000 rpm   * Pulsatility index 2.3   * Power 3.3 Dahl   * Flow 3.9 L/minute   Fluid status: euvolemic   Alarms were reviewed, and notable for frequent PI events, no power spikes, speed drops, or other findings suspicious of pump malfunction noted.   The driveline exit site was inspected, CDI.   All external components were inspected and showed no evidence of damage or malfunction, none replaced.   No changes to VAD settings made

## 2024-11-03 NOTE — PLAN OF CARE
Temp: 97.8  F (36.6  C) Temp src: Oral   Pulse: 100   Resp: 16 SpO2: 97 % O2 Device: None (Room air)       Hours of care: [id number]    D: Presented 10/23/24 from LVAD clinic with hypotension, dizziness, and new right arm weakness.     I/A: Duane (he/him) is A/O x4. Calls appropriately, calm and cooperative. Tele in place, Aflutter, HR 80s-100s. VSS on RA. R and L PIV in place SL. Scattered bruising and scabs, driveline site. No new skin deficits noted. Tolerating regular diet with supplemental drinks. Up SBA/Ind in room. Adequate UOP, no BM this shift. Appeared to sleep well overnight.    Changed:  Running:   PRN:    P: Continue to monitor and follow POC. Awaiting VAD education, plan for Monday (11/4), then can discharge. Notify CARDS 2 with changes.    Goal Outcome Evaluation:    Plan of Care Reviewed With: patient  Overall Patient Progress: improving  Outcome Evaluation: MAPs stable. HM3 numbers WNL, no alarms overnight. Denies pain.

## 2024-11-04 ENCOUNTER — APPOINTMENT (OUTPATIENT)
Dept: OCCUPATIONAL THERAPY | Facility: CLINIC | Age: 74
DRG: 286 | End: 2024-11-04
Payer: MEDICARE

## 2024-11-04 LAB
ALBUMIN SERPL BCG-MCNC: 3.4 G/DL (ref 3.5–5.2)
ALP SERPL-CCNC: 138 U/L (ref 40–150)
ALT SERPL W P-5'-P-CCNC: 50 U/L (ref 0–70)
ANION GAP SERPL CALCULATED.3IONS-SCNC: 10 MMOL/L (ref 7–15)
AST SERPL W P-5'-P-CCNC: 42 U/L (ref 0–45)
BILIRUB SERPL-MCNC: 0.4 MG/DL
BUN SERPL-MCNC: 34 MG/DL (ref 8–23)
CALCIUM SERPL-MCNC: 8.7 MG/DL (ref 8.8–10.4)
CHLORIDE SERPL-SCNC: 89 MMOL/L (ref 98–107)
CREAT SERPL-MCNC: 0.69 MG/DL (ref 0.67–1.17)
EGFRCR SERPLBLD CKD-EPI 2021: >90 ML/MIN/1.73M2
GLUCOSE SERPL-MCNC: 93 MG/DL (ref 70–99)
HCO3 SERPL-SCNC: 25 MMOL/L (ref 22–29)
INR PPP: 2.77 (ref 0.85–1.15)
LDH SERPL L TO P-CCNC: 223 U/L (ref 0–250)
POTASSIUM SERPL-SCNC: 4.8 MMOL/L (ref 3.4–5.3)
PROT SERPL-MCNC: 6.5 G/DL (ref 6.4–8.3)
SODIUM SERPL-SCNC: 124 MMOL/L (ref 135–145)

## 2024-11-04 PROCEDURE — 36415 COLL VENOUS BLD VENIPUNCTURE: CPT | Performed by: PHYSICIAN ASSISTANT

## 2024-11-04 PROCEDURE — 120N000003 HC R&B IMCU UMMC

## 2024-11-04 PROCEDURE — 250N000013 HC RX MED GY IP 250 OP 250 PS 637

## 2024-11-04 PROCEDURE — 99232 SBSQ HOSP IP/OBS MODERATE 35: CPT | Mod: 25 | Performed by: NURSE PRACTITIONER

## 2024-11-04 PROCEDURE — 85610 PROTHROMBIN TIME: CPT | Performed by: NURSE PRACTITIONER

## 2024-11-04 PROCEDURE — 97530 THERAPEUTIC ACTIVITIES: CPT | Mod: GO

## 2024-11-04 PROCEDURE — 93750 INTERROGATION VAD IN PERSON: CPT | Performed by: NURSE PRACTITIONER

## 2024-11-04 PROCEDURE — 250N000013 HC RX MED GY IP 250 OP 250 PS 637: Performed by: INTERNAL MEDICINE

## 2024-11-04 PROCEDURE — 250N000013 HC RX MED GY IP 250 OP 250 PS 637: Performed by: NURSE PRACTITIONER

## 2024-11-04 PROCEDURE — 83615 LACTATE (LD) (LDH) ENZYME: CPT | Performed by: PHYSICIAN ASSISTANT

## 2024-11-04 PROCEDURE — 80053 COMPREHEN METABOLIC PANEL: CPT | Performed by: PHYSICIAN ASSISTANT

## 2024-11-04 RX ORDER — WARFARIN SODIUM 2.5 MG/1
2.5 TABLET ORAL
Status: COMPLETED | OUTPATIENT
Start: 2024-11-04 | End: 2024-11-04

## 2024-11-04 RX ORDER — HYDRALAZINE HYDROCHLORIDE 50 MG/1
50 TABLET, FILM COATED ORAL EVERY 8 HOURS SCHEDULED
Status: DISCONTINUED | OUTPATIENT
Start: 2024-11-04 | End: 2024-11-05

## 2024-11-04 RX ADMIN — WARFARIN SODIUM 2.5 MG: 2.5 TABLET ORAL at 18:10

## 2024-11-04 RX ADMIN — Medication 1 TABLET: at 07:39

## 2024-11-04 RX ADMIN — HYDRALAZINE HYDROCHLORIDE 75 MG: 25 TABLET ORAL at 06:35

## 2024-11-04 RX ADMIN — LOSARTAN POTASSIUM 50 MG: 50 TABLET, FILM COATED ORAL at 07:39

## 2024-11-04 RX ADMIN — AMIODARONE HYDROCHLORIDE 200 MG: 200 TABLET ORAL at 07:39

## 2024-11-04 RX ADMIN — MAGNESIUM OXIDE TAB 400 MG (241.3 MG ELEMENTAL MG) 400 MG: 400 (241.3 MG) TAB at 07:39

## 2024-11-04 RX ADMIN — HYDRALAZINE HYDROCHLORIDE 50 MG: 50 TABLET ORAL at 14:23

## 2024-11-04 RX ADMIN — ATORVASTATIN CALCIUM 80 MG: 80 TABLET, FILM COATED ORAL at 20:36

## 2024-11-04 RX ADMIN — HYDRALAZINE HYDROCHLORIDE 50 MG: 50 TABLET ORAL at 22:01

## 2024-11-04 RX ADMIN — ESCITALOPRAM OXALATE 10 MG: 10 TABLET ORAL at 07:39

## 2024-11-04 RX ADMIN — AMLODIPINE BESYLATE 10 MG: 10 TABLET ORAL at 07:39

## 2024-11-04 RX ADMIN — LOSARTAN POTASSIUM 50 MG: 50 TABLET, FILM COATED ORAL at 20:37

## 2024-11-04 RX ADMIN — GABAPENTIN 100 MG: 100 CAPSULE ORAL at 22:00

## 2024-11-04 NOTE — PROGRESS NOTES
[facility]   Cardiology II Service / Advanced Heart Failure  Daily Progress Note      Patient: Duane C Johnson  MRN: [id number]  Admission Date: 10/23/2024  Hospital Day # 12    Assessment and Plan: Duane C Johnson is a 74 year old male who presents from LVAD clinic with hypotension, dizziness, and new right arm weakness. Doppler MAP in clinic ranging 45-48. In reviewing the TCU chart, MAPS have been ranging from 60-84 with well controled HRs in the 50s-60s. For afterload his hydralazine was decreased yesterday from 100 mg q8h to 75 mg every 8 hours. He is still getting losartan 50 mg BID, although one dose was held yesterday. Last low flow alarm 10/19. He had several prolonged low flow alarms on 10/17 for which hydralazine was increased. CT-A was done on 10/17/24 with no outflow graft ostruction. The outflow graft does make an acute angle as it approaches the aorta. The patient's LVAD has had frequent low flow alarms over the course of his hospitalization with elevated MAPs.    Today's Plan:  - encourage PO intake with non-free water (ie juice, even soda, soups), encourage salty snacks  - monitor MAP, goal MAP 70-85  - reduce hydralazine to 50 mg q8h  - VAD re-education resume today, plan for another session tomorrow  - possible discharge home in 1-2 days pending education, stable MAPs, asymptomatic       # Acute on chronic systolic heart failure secondary to ICM   # s/p HM3 LVAD as DT on 9/18/2024  # CAD s/p PCI  # History of STEMI  # s/p ICD 5/2024  # Acute angle of outflow fraft  Stage D. NYHA Class III.    10/23/24 RA 6, PA 40/14/20, PCW 9, Goldy CO/CI 4.54/2.59 at 5100 rpm    Fluid status: euvolemic   ACEi/ARB/ARNi: losartan 50 BID  Vasodilator: significant dizziness this AM with MAP 62 last evening on hydralazine 75 mg, reduce to 50 mg q8h and amlodipine 10 mg daily  BB: deferred due to recent VAD  Aldosterone antagonist: STOPPED blane due to hypoNa  SGLT2i: STOPPED empagliflozin 10 mg d/t  propensity for hypovolemia  SCD prophylaxis: ICD  MAP: goal 70-90, 70s today  LDH trends: stable 200s  Anticoagulation: warfarin dosing per pharmacy, INR goal 2-3  Antiplatelet: ASA not indicated in LVAD population, > 6 months s/p STEMI     # Low flow alarms, resolved  # Elevated PIs  # Labile MAPs   # Suspected orthostatic hypotension, resolved  Speed optimization echo performed 10/1, speed decreased to 5100. Low flows seems releated to hypertension and some hypovolemia as well. Have improved with re-timing hydralazine and after IV fluids. CT-A was done on  10/17/24 with no outflow graft ostruction. The outflow graft does make an acute angle as it approaches the aorta, discussed with Dr. Valentine, unlikely to be impacting the low flows at this time.  Having daily low flow alarms, always in the setting of htn. Mostly ~5 am, although has had some in the evening as well. They do not seem to be positional. RHC with reasonable filling pressures as above. Has been asymptomatic.  - Decreased speed to 5000 on 10/27  - Losartan 50 mg BID  - hydralazine and amlodipine as above  - stopped fludrocortisone 0.1 mg daily   - Increase PO intake as above, specifically right before bed, non free-water sources due to hypoNa  - temp drop in hematocrit on monitor 10/29-10/30, low-flow controller change out 10/30, changed hematocrit back 10/31     # History of VT/VF arrest 2023  # AFib s/p DCCV 9/2024 and again 9/30/2024   Successfully cardioverted in early September for AF. Since then EKG suggested AF on 9/21. Tele is only available from as early as 9/22. The patient was started on hep gtt 9/21, but unclear if was in AF before this. While the patient was on hep gtt until INR was therapeutic, it is not possible to assess rhythm prior to 9/21. Systemic AC was off on 9/18-9/20. S/p MELBA and DCCV on 9/30 with conversion to sinus rhythm.  10/23 device interrogation shows persistent episode of AF since 10/10 with rates   - Continue  "amiodarone 200 mg daily  - Ongoing a fib- given ongoing low flows- deferring cardioversion for now given reasonable rate control. Will discuss adding digoxin as well  - AC as above     # Visual changes, resolved.   # Generalized weakness  # Headache  Stoke code called 9/29 for visual changes - right eye with decreased upper half of vision and bluish haze. Resolved after 30 minutes. Seen by opthalmology and neuro. Negative head CT and CTA head and neck. He has had 3 episodes which last about 5 minutes before resolving completely.      On 10/9 Stroke code activated due to concern of generalized weakness, numbness and headache. LKW reported as 0715 when he worked with therapies and shortly after started feeling generally weak. Then around 1500 he had onset of moderate \"tension\" type headache and tingling in the bilateral fingertips. Per RRT URIAH patient now reports tingling has resolved and headache is improving. MAP 48, INR 2.2. Given absence of focal deficits and rapidly improving symptoms, opted to cancel stroke.  CT head negative, though did show chronic maxillary sinusitis.      Stroke code called 10/23 when seen in clinic with report of new RUE weakness. He was notably hypotensive at this time with MAP ~45 with new right pronator drift. Evaluated in ED, CT and CTA head and neck negative. RUE weakness resolved.   - monitor for changes- no current symptoms     # Hypovolemia hyponatremia  Na 124, improving   - encourage high sodium PO intake/gatorade and salty foods  - daily BMP    # Blood culture postivie for staph epi. 1/2 bottles. No fevers. No symptoms.  - received one dose IV vanco, stopped given likely contaminant  - repeat blood culture pending 10/25 negative, continue to follow  - monitor    FEN: regular diet  PROPHY:  warfarin  LINES:  PIV  DISPO:  1-2 days pending VAD education   CODE STATUS:  Full Code    Time/Communication  I spent 40 minutes reviewing results, care team notes, meeting directly with the " "patient, coordinating care, and completing documentation on the day of service.     Patient discussed with Dr. Moshe Arias, DNP, NP-C  Nurse Practitioner - Advanced Heart Failure/Cardiology II Service  Rhett preferred or Pager [phone]    ================================================================    Subjective/24-Hr Events:   Last 24 hr care team notes reviewed. No low flow alarms in several days. MAPs 60s-70s  in last 24 hrs. Did have episode of dizziness with OT this AM. Denies stroke symptoms or bleeding. Breathing feels well. Eager to discharge.     ROS:  4 point ROS including respiratory, CV, GI and  (other than that noted in the HPI) is negative.     Medications: Reviewed in EPIC.     Physical Exam:   /88   Pulse 87   Temp 97.9  F (36.6  C) (Oral)   Resp 16   Ht 1.702 m (5' 7\")   Wt 60.6 kg (133 lb 8 oz)   SpO2 97%   BMI 20.91 kg/m      GENERAL: Appears comfortable, in no distress.  HEENT: Eye symmetrical, no discharge or icterus bilaterally.   NECK: Supple, JVD at clavicle at 90 degrees.   CV: regular rate, irregular, +LVAD hum   RESPIRATORY: Respirations regular, even, and unlabored. Lungs CTA throughout.    GI: Soft and non distended. No tenderness. Normal bowel sounds present  EXTREMITIES: No peripheral edema. All extremities are warm and well perfused  NEUROLOGIC: Alert and interacting appropriatly. No focal deficits.   MUSCULOSKELETAL: No joint swelling or tenderness.   SKIN: No jaundice. No rashes or lesions.     Labs:  CMP  Recent Labs   Lab 11/04/24  0514 11/03/24  0539 11/02/24  0723 11/01/24  0522   * 126* 123* 124*   POTASSIUM 4.8 4.8 4.7 4.3   CHLORIDE 89* 92* 90* 92*   CO2 25 24 24 24   ANIONGAP 10 10 9 8   GLC 93 86 90 88   BUN 34.0* 27.8* 23.1* 18.7   CR 0.69 0.67 0.66* 0.59*   GFRESTIMATED >90 >90 >90 >90   PRANAV 8.7* 9.0 8.8 8.8   PROTTOTAL 6.5 6.4 6.7 6.4   ALBUMIN 3.4* 3.4* 3.4* 3.3*   BILITOTAL 0.4 0.4 0.4 0.4   ALKPHOS 138 128 132 120   AST 42 42 " 38 37   ALT 50 45 40 40       CBC  Recent Labs   Lab 11/03/24  0539 11/01/24  0522 10/30/24  0554   WBC 6.4 5.1 5.5   RBC 3.44* 3.43* 3.42*   HGB 10.4* 10.3* 10.4*   HCT 31.9* 31.6* 31.8*   MCV 93 92 93   MCH 30.2 30.0 30.4   MCHC 32.6 32.6 32.7   RDW 16.0* 16.0* 16.4*    175 195       INR  Recent Labs   Lab 11/04/24  0514 11/03/24  0539 11/02/24  0723 11/01/24  0522   INR 2.77* 3.03* 2.76* 2.85*

## 2024-11-04 NOTE — PROCEDURES
The patient's HeartMate LVAD was interrogated 11/4/2024  * Speed 5000 rpm   * Pulsatility index 3.2   * Power 3.1 Dahl   * Flow 3/4 L/minute   Fluid status: euvolemic   Alarms were reviewed, and notable for frequent PI events, no power spikes, speed drops, or other findings suspicious of pump malfunction noted.   The driveline exit site was inspected, CDI.   All external components were inspected and showed no evidence of damage or malfunction, none replaced.   No changes to VAD settings made

## 2024-11-04 NOTE — PLAN OF CARE
AO, neuros intact vss RA aflutt afib 90's maps 70's vad numbers WDL last BM 11/3 denies pain good oral intake of liquid independent in room with walker / sba in hallway to walk    Discharge home pending stable maps and vad education. VAD dressing changed, photo sent to vad coordinator per request.

## 2024-11-04 NOTE — PROGRESS NOTES
D/He tells me he is getting more VAD education tomorrow and he thinks he may go home in next few days. Miya/zaid. He continues in chronic flutter (or fib)on coumadin. He continues to drink supplements and water throughout the shift  per plan from the team to maintain good VAD numbers and BP  A/VAD numbers are okay  P/monitor for changes, keep him and team updated

## 2024-11-04 NOTE — PROGRESS NOTES
"Working with OT, walking the unit hallways, patient endorses dizziness and vision changes; vad PI's fluctuated between 4.6 and 1.4. Patient neuros assessed and unremarkable. Reported assessments to C2 NP LIONEL Arias. Shared assessments with VAD coordinator also. VAD coordinator to re-educated patient from noon-1400 today.    Baseline intermittent confusion: Patient asked RN, \"What is a VAD?\".  "

## 2024-11-04 NOTE — PLAN OF CARE
Goal Outcome Evaluation:    NURSING PROGRESS NOTE  Shift Summary    Date: November 4, 2024     Neuro/Musculoskeletal:  A&Ox4,afebrile,able to make needs known  Cardiac:  A-Flutter/A-fib  VSS.     Respiratory:  Sating in the 90s on RA.  GI/:  Adequate urine output.  LBM: 11/3  Diet/Appetite:  Tolerating regular diet.  Activity:  Independent in room   Pain:  Denies any pain  Skin:  No new deficits noted.   LDAs + Drips/IVF:  R+L PIV  Protocols/Labs:    None  Pertinent Shift Updates:    Pt had an uneventful night, stable, no changes noted overnight    Plan:    -LVAD education   -Possible discharge   -Monitor Map (65-90)  -Encourage PO intake  Petra Shrestha RN  .................................................... November 4, 2024   1:05 AM  [facility] ([facility]): [facility] ICU (Unit 6C)

## 2024-11-04 NOTE — PROGRESS NOTES
"I did a review session on the VAD Education materials with Duane.    Duane had decent recall when questioned about topics such as the green light, the safety latch, the red button, VAD speed, jackson as a measure of the energy needed to spin the pump at the set speed, how long the batteries last, how long the emergency backup battery last, who to call for help and in emergencies, signs of driveline infection, what to do for advisory and emergency alarms.    He seemed unclear on the three things that the controller self test tests: the screen message, all the lights coming on and then going off (except for the green pump is spinning light) and both alarms sounding. He repeatedly said the \" siren\" (both alarms heard at the same time) happens when the batteries are low. The two-toned alarm only ever happens with a self test. It is the advisory beeping alarm and the emergency continuous alarm heard at the same time.    At first he claimed to know nothing about INR or coumadin. Once we started talking about it, he admitted he did know what I was talking about.    I asked Duane to consider thinking about his answer first before saying he had no idea what the answer was.    I will plan to review more VAD materials with him Tuesday morning 9 to 11. He may be ready for discharge from a VAD education perspective after tomorrow's EDU session.  "

## 2024-11-04 NOTE — PLAN OF CARE
Goal Outcome Evaluation:     I/A: A/Ox4, neuros intact q4h. VSS on RA. Aflutter 80s-100s. See provider notification regarding MAPs. LVAD #'s WNL, no alarms noted. Dressing changed. Denies pain. Good PO. Adequate UOP. LBM 11/3. Ambulated halls independently.     P: Possible discharge to home tomorrow pending LVAD education and stable MAPs.    Hours of care: [id number]

## 2024-11-05 ENCOUNTER — HOSPITAL ENCOUNTER (INPATIENT)
Facility: SKILLED NURSING FACILITY | Age: 74
End: 2024-11-05
Payer: MEDICARE

## 2024-11-05 ENCOUNTER — APPOINTMENT (OUTPATIENT)
Dept: OCCUPATIONAL THERAPY | Facility: CLINIC | Age: 74
DRG: 286 | End: 2024-11-05
Payer: MEDICARE

## 2024-11-05 LAB
ALBUMIN SERPL BCG-MCNC: 3.3 G/DL (ref 3.5–5.2)
ALP SERPL-CCNC: 120 U/L (ref 40–150)
ALT SERPL W P-5'-P-CCNC: 45 U/L (ref 0–70)
ANION GAP SERPL CALCULATED.3IONS-SCNC: 7 MMOL/L (ref 7–15)
AST SERPL W P-5'-P-CCNC: 40 U/L (ref 0–45)
BILIRUB SERPL-MCNC: 0.4 MG/DL
BUN SERPL-MCNC: 28 MG/DL (ref 8–23)
CALCIUM SERPL-MCNC: 9 MG/DL (ref 8.8–10.4)
CHLORIDE SERPL-SCNC: 91 MMOL/L (ref 98–107)
CREAT SERPL-MCNC: 0.64 MG/DL (ref 0.67–1.17)
EGFRCR SERPLBLD CKD-EPI 2021: >90 ML/MIN/1.73M2
ERYTHROCYTE [DISTWIDTH] IN BLOOD BY AUTOMATED COUNT: 15.8 % (ref 10–15)
GLUCOSE SERPL-MCNC: 88 MG/DL (ref 70–99)
HCO3 SERPL-SCNC: 25 MMOL/L (ref 22–29)
HCT VFR BLD AUTO: 31.9 % (ref 40–53)
HGB BLD-MCNC: 10.5 G/DL (ref 13.3–17.7)
INR PPP: 2.5 (ref 0.85–1.15)
LDH SERPL L TO P-CCNC: 226 U/L (ref 0–250)
MCH RBC QN AUTO: 30.3 PG (ref 26.5–33)
MCHC RBC AUTO-ENTMCNC: 32.9 G/DL (ref 31.5–36.5)
MCV RBC AUTO: 92 FL (ref 78–100)
PLATELET # BLD AUTO: 181 10E3/UL (ref 150–450)
POTASSIUM SERPL-SCNC: 4.6 MMOL/L (ref 3.4–5.3)
PROT SERPL-MCNC: 6.3 G/DL (ref 6.4–8.3)
RBC # BLD AUTO: 3.46 10E6/UL (ref 4.4–5.9)
SODIUM SERPL-SCNC: 123 MMOL/L (ref 135–145)
WBC # BLD AUTO: 5.5 10E3/UL (ref 4–11)

## 2024-11-05 PROCEDURE — 97530 THERAPEUTIC ACTIVITIES: CPT | Mod: GO

## 2024-11-05 PROCEDURE — 258N000003 HC RX IP 258 OP 636

## 2024-11-05 PROCEDURE — 36415 COLL VENOUS BLD VENIPUNCTURE: CPT | Performed by: PHYSICIAN ASSISTANT

## 2024-11-05 PROCEDURE — 250N000013 HC RX MED GY IP 250 OP 250 PS 637: Performed by: PHYSICIAN ASSISTANT

## 2024-11-05 PROCEDURE — 85610 PROTHROMBIN TIME: CPT | Performed by: NURSE PRACTITIONER

## 2024-11-05 PROCEDURE — 80053 COMPREHEN METABOLIC PANEL: CPT | Performed by: PHYSICIAN ASSISTANT

## 2024-11-05 PROCEDURE — 99233 SBSQ HOSP IP/OBS HIGH 50: CPT | Mod: 25 | Performed by: PHYSICIAN ASSISTANT

## 2024-11-05 PROCEDURE — 250N000013 HC RX MED GY IP 250 OP 250 PS 637: Performed by: NURSE PRACTITIONER

## 2024-11-05 PROCEDURE — 250N000013 HC RX MED GY IP 250 OP 250 PS 637: Performed by: INTERNAL MEDICINE

## 2024-11-05 PROCEDURE — 85027 COMPLETE CBC AUTOMATED: CPT | Performed by: NURSE PRACTITIONER

## 2024-11-05 PROCEDURE — 83615 LACTATE (LD) (LDH) ENZYME: CPT | Performed by: PHYSICIAN ASSISTANT

## 2024-11-05 PROCEDURE — 93750 INTERROGATION VAD IN PERSON: CPT | Performed by: PHYSICIAN ASSISTANT

## 2024-11-05 PROCEDURE — 120N000003 HC R&B IMCU UMMC

## 2024-11-05 RX ORDER — HYDRALAZINE HYDROCHLORIDE 25 MG/1
25 TABLET, FILM COATED ORAL EVERY 8 HOURS SCHEDULED
Status: DISCONTINUED | OUTPATIENT
Start: 2024-11-05 | End: 2024-11-09 | Stop reason: HOSPADM

## 2024-11-05 RX ORDER — WARFARIN SODIUM 2.5 MG/1
2.5 TABLET ORAL
Status: COMPLETED | OUTPATIENT
Start: 2024-11-05 | End: 2024-11-05

## 2024-11-05 RX ADMIN — LOSARTAN POTASSIUM 50 MG: 50 TABLET, FILM COATED ORAL at 09:10

## 2024-11-05 RX ADMIN — LOSARTAN POTASSIUM 50 MG: 50 TABLET, FILM COATED ORAL at 20:56

## 2024-11-05 RX ADMIN — MAGNESIUM OXIDE TAB 400 MG (241.3 MG ELEMENTAL MG) 400 MG: 400 (241.3 MG) TAB at 09:10

## 2024-11-05 RX ADMIN — SODIUM CHLORIDE, POTASSIUM CHLORIDE, SODIUM LACTATE AND CALCIUM CHLORIDE 500 ML: 600; 310; 30; 20 INJECTION, SOLUTION INTRAVENOUS at 17:51

## 2024-11-05 RX ADMIN — AMLODIPINE BESYLATE 10 MG: 10 TABLET ORAL at 09:10

## 2024-11-05 RX ADMIN — HYDRALAZINE HYDROCHLORIDE 50 MG: 50 TABLET ORAL at 06:00

## 2024-11-05 RX ADMIN — Medication 1 TABLET: at 09:10

## 2024-11-05 RX ADMIN — HYDRALAZINE HYDROCHLORIDE 25 MG: 25 TABLET ORAL at 22:25

## 2024-11-05 RX ADMIN — ATORVASTATIN CALCIUM 80 MG: 80 TABLET, FILM COATED ORAL at 20:56

## 2024-11-05 RX ADMIN — WARFARIN SODIUM 2.5 MG: 2.5 TABLET ORAL at 17:54

## 2024-11-05 RX ADMIN — GABAPENTIN 100 MG: 100 CAPSULE ORAL at 22:25

## 2024-11-05 RX ADMIN — ESCITALOPRAM OXALATE 10 MG: 10 TABLET ORAL at 09:10

## 2024-11-05 RX ADMIN — AMIODARONE HYDROCHLORIDE 200 MG: 200 TABLET ORAL at 09:10

## 2024-11-05 NOTE — PROGRESS NOTES
The patient's HeartMate LVAD was interrogated 11/5/2024  * Speed 5000 rpm   * Pulsatility index 3.0   * Power 3.2 Dahl   * Flow 3.8 L/minute   Fluid status: hypovolemic   Alarms were reviewed, and notable for no alarms, very frequent pi events.   The driveline exit site was inspected, c/d/i.   All external components were inspected and showed no evidence of damage or malfunction, none replaced.   No changes to VAD settings made

## 2024-11-05 NOTE — PROGRESS NOTES
VAD Education was reviewed with Duane today. Since reviewing materials yesterday, today's recall was spotty. Duane repeatedly said that he was told he doesn't need to know all the facts but just needs to know where to find them. I brought him another binder for him to review alarms, parameters, INR goal, coumadin tips, signs of DLES infection, when to call a VAD coordinator, etc.    I will do another review session with Duane tomorrow and then he will have a neuropsych evaluation.

## 2024-11-05 NOTE — PROGRESS NOTES
[facility]   Cardiology II Service / Advanced Heart Failure  Daily Progress Note      Patient: Duane C Johnson  MRN: [id number]  Admission Date: 10/23/2024  Hospital Day # 13    Assessment and Plan: Duane C Johnson is a 74 year old male who presents from LVAD clinic with hypotension, dizziness, and new right arm weakness. Doppler MAP in clinic ranging 45-48. In reviewing the TCU chart, MAPS have been ranging from 60-84 with well controled HRs in the 50s-60s. For afterload his hydralazine was decreased yesterday from 100 mg q8h to 75 mg every 8 hours. He is still getting losartan 50 mg BID, although one dose was held yesterday. Last low flow alarm 10/19. He had several prolonged low flow alarms on 10/17 for which hydralazine was increased. CT-A was done on 10/17/24 with no outflow graft ostruction. The outflow graft does make an acute angle as it approaches the aorta. The patient's LVAD has had frequent low flow alarms over the course of his hospitalization with elevated MAPs.    Today's Plan:  - encourage PO intake with non-free water (ie juice, even soda, soups), encourage salty snacks  - monitor MAP, goal MAP 65-85  - reduced hydralazine to 25 mg q8h  - VAD re-education resume today, plan for another session tomorrow  - neuropysch eval tomorrow   - possible discharge home in 1-2 days pending education, stable MAPs, asymptomatic     # Acute on chronic systolic heart failure secondary to ICM   # s/p HM3 LVAD as DT on 9/18/2024  # CAD s/p PCI  # History of STEMI  # s/p ICD 5/2024  # Acute angle of outflow fraft  Stage D. NYHA Class III.    10/23/24 RA 6, PA 40/14/20, PCW 9, Goldy CO/CI 4.54/2.59 at 5100 rpm    Fluid status: euvolemic   ACEi/ARB/ARNi: losartan 50 BID  Vasodilator: significant dizziness this AM with MAP 60 last evening on hydralazine 50 mg, reduce to 25 mg q8h and amlodipine 10 mg daily  BB: deferred due to recent VAD  Aldosterone antagonist: STOPPED blane due to hypoNa  SGLT2i: STOPPED  empagliflozin 10 mg d/t propensity for hypovolemia  SCD prophylaxis: ICD  MAP: goal 70-90, lower today, encouraging pos and decreasing hydralazine (2pm dose was held)  LDH trends: stable 200s  Anticoagulation: warfarin dosing per pharmacy, INR goal 2-3  Antiplatelet: ASA not indicated in LVAD population, > 6 months s/p STEMI     # Low flow alarms, resolved  # Elevated PIs  # Labile MAPs   # Suspected orthostatic hypotension, resolved  Speed optimization echo performed 10/1, speed decreased to 5100. Low flows seems releated to hypertension and some hypovolemia as well. Have improved with re-timing hydralazine and after IV fluids. CT-A was done on  10/17/24 with no outflow graft ostruction. The outflow graft does make an acute angle as it approaches the aorta, discussed with Dr. Valentine, unlikely to be impacting the low flows at this time.  Having daily low flow alarms, always in the setting of htn. Mostly ~5 am, although has had some in the evening as well. They do not seem to be positional. RHC with reasonable filling pressures as above. Has been asymptomatic.  - Decreased speed to 5000 on 10/27  - Losartan 50 mg BID  - Hydralazine and amlodipine as above  - Stopped fludrocortisone 0.1 mg daily   - Increase PO intake as above, specifically right before bed, non free-water sources due to hypoNa  - Temp drop in hematocrit on monitor 10/29-10/30, low-flow controller change out 10/30, changed hematocrit back 10/31     # History of VT/VF arrest 2023  # AFib s/p DCCV 9/2024 and again 9/30/2024   Successfully cardioverted in early September for AF. Since then EKG suggested AF on 9/21. Tele is only available from as early as 9/22. The patient was started on hep gtt 9/21, but unclear if was in AF before this. While the patient was on hep gtt until INR was therapeutic, it is not possible to assess rhythm prior to 9/21. Systemic AC was off on 9/18-9/20. S/p MELBA and DCCV on 9/30 with conversion to sinus rhythm.  10/23 device  "interrogation shows persistent episode of AF since 10/10 with rates   - Continue amiodarone 200 mg daily  - Ongoing a fib- given ongoing low flows- deferring cardioversion for now given reasonable rate control. Will discuss adding digoxin as well  - AC as above     # Visual changes, resolved.   # Generalized weakness  # Headache  Stoke code called 9/29 for visual changes - right eye with decreased upper half of vision and bluish haze. Resolved after 30 minutes. Seen by opthalmology and neuro. Negative head CT and CTA head and neck. He has had 3 episodes which last about 5 minutes before resolving completely.      On 10/9 Stroke code activated due to concern of generalized weakness, numbness and headache. LKW reported as 0715 when he worked with therapies and shortly after started feeling generally weak. Then around 1500 he had onset of moderate \"tension\" type headache and tingling in the bilateral fingertips. Per RRT URIAH patient now reports tingling has resolved and headache is improving. MAP 48, INR 2.2. Given absence of focal deficits and rapidly improving symptoms, opted to cancel stroke.  CT head negative, though did show chronic maxillary sinusitis.      Stroke code called 10/23 when seen in clinic with report of new RUE weakness. He was notably hypotensive at this time with MAP ~45 with new right pronator drift. Evaluated in ED, CT and CTA head and neck negative. RUE weakness resolved.   - monitor for changes- no current symptoms     # Hypovolemia hyponatremia  Na 123  - encourage high sodium PO intake/gatorade and salty foods  - daily BMP    # Blood culture postivie for staph epi. 1/2 bottles. No fevers. No symptoms.  - received one dose IV vanco, stopped given likely contaminant  - repeat blood culture pending 10/25 negative  - monitor    FEN: regular diet  PROPHY:  warfarin  LINES:  PIV  DISPO:  1-2 days pending VAD education   CODE STATUS:  Full Code    Time/Communication  I spent 55 minutes " "reviewing results, care team notes, meeting directly with the patient, coordinating care, and completing documentation on the day of service.     Patient discussed with Dr. Moshe Forrest PA-C  Advanced Heart Failure/Cardiology II Service  [facility]tt preferred or Pager [phone]    ================================================================    Subjective/24-Hr Events:   Last 24 hr care team notes reviewed. No low flow alarms in several days. MAPs 60s-70s  in last 24 hrs. Did have episode of dizziness this AM and so has not been out of bed. Denies stroke symptoms or bleeding. Breathing feels well. He is working on the vad education.     ROS:  4 point ROS including respiratory, CV, GI and  (other than that noted in the HPI) is negative.     Medications: Reviewed in EPIC.     Physical Exam:   BP 94/54 (BP Location: Right arm)   Pulse 102   Temp 97.8  F (36.6  C) (Oral)   Resp 16   Ht 1.702 m (5' 7\")   Wt 61.2 kg (135 lb)   SpO2 100%   BMI 21.14 kg/m      GENERAL: Appears comfortable, in no distress.  HEENT: Eye symmetrical, no discharge or icterus bilaterally.   NECK: Supple, JVD at clavicle at 90 degrees.   CV: regular rate, irregular, +LVAD hum   RESPIRATORY: Respirations regular, even, and unlabored. Lungs CTA throughout.    GI: Soft and non distended. No tenderness. Normal bowel sounds present  EXTREMITIES: No peripheral edema. All extremities are warm and well perfused  NEUROLOGIC: Alert and interacting appropriatly. No focal deficits.   MUSCULOSKELETAL: No joint swelling or tenderness.   SKIN: No jaundice. No rashes or lesions.     Labs:  CMP  Recent Labs   Lab 11/05/24  0512 11/04/24  0514 11/03/24  0539 11/02/24  0723   * 124* 126* 123*   POTASSIUM 4.6 4.8 4.8 4.7   CHLORIDE 91* 89* 92* 90*   CO2 25 25 24 24   ANIONGAP 7 10 10 9   GLC 88 93 86 90   BUN 28.0* 34.0* 27.8* 23.1*   CR 0.64* 0.69 0.67 0.66*   GFRESTIMATED >90 >90 >90 >90   PRANAV 9.0 8.7* 9.0 8.8   PROTTOTAL 6.3* 6.5 " 6.4 6.7   ALBUMIN 3.3* 3.4* 3.4* 3.4*   BILITOTAL 0.4 0.4 0.4 0.4   ALKPHOS 120 138 128 132   AST 40 42 42 38   ALT 45 50 45 40       CBC  Recent Labs   Lab 11/05/24  0512 11/03/24  0539 11/01/24  0522 10/30/24  0554   WBC 5.5 6.4 5.1 5.5   RBC 3.46* 3.44* 3.43* 3.42*   HGB 10.5* 10.4* 10.3* 10.4*   HCT 31.9* 31.9* 31.6* 31.8*   MCV 92 93 92 93   MCH 30.3 30.2 30.0 30.4   MCHC 32.9 32.6 32.6 32.7   RDW 15.8* 16.0* 16.0* 16.4*    168 175 195       INR  Recent Labs   Lab 11/05/24  0512 11/04/24  0514 11/03/24  0539 11/02/24  0723   INR 2.50* 2.77* 3.03* 2.76*

## 2024-11-05 NOTE — PLAN OF CARE
[id number]    Diagnosis: LVAD HM 3, hypotension, low flow alarms     Neuro: A&O x4, could be forget but has been fine all night. CMS intact.   Cardiac: A. Flutter (HR 80-90). Denied chest pain, dizziness, palpitations. LVAD numbers WNL. MAP in the 70s.   Respiratory: RA, sating >95%. DIOP noted with activity.   GI/: WDL.   Diet/Appetite: Regular diet, okay appetite. Encourage pt to work on PO intake. Pt is keeping track of all intake.   Skin: LVAD sites C/D/I. No new deficits noted.   LDA: Left and right  PIV.  Activity: SBA/independent   Pain: Denies pain.   Plan: Continue with VAD education, possible discharge in the next 1-2 days. Continue with POC. Notify CARDS 2of pertinent changes.

## 2024-11-06 ENCOUNTER — APPOINTMENT (OUTPATIENT)
Dept: PHYSICAL THERAPY | Facility: CLINIC | Age: 74
DRG: 286 | End: 2024-11-06
Payer: MEDICARE

## 2024-11-06 ENCOUNTER — APPOINTMENT (OUTPATIENT)
Dept: OCCUPATIONAL THERAPY | Facility: CLINIC | Age: 74
DRG: 286 | End: 2024-11-06
Payer: MEDICARE

## 2024-11-06 LAB
ALBUMIN SERPL BCG-MCNC: 3.4 G/DL (ref 3.5–5.2)
ALP SERPL-CCNC: 110 U/L (ref 40–150)
ALT SERPL W P-5'-P-CCNC: 44 U/L (ref 0–70)
ANION GAP SERPL CALCULATED.3IONS-SCNC: 10 MMOL/L (ref 7–15)
AST SERPL W P-5'-P-CCNC: 41 U/L (ref 0–45)
BILIRUB SERPL-MCNC: 0.5 MG/DL
BUN SERPL-MCNC: 28 MG/DL (ref 8–23)
CALCIUM SERPL-MCNC: 8.8 MG/DL (ref 8.8–10.4)
CHLORIDE SERPL-SCNC: 92 MMOL/L (ref 98–107)
CREAT SERPL-MCNC: 0.67 MG/DL (ref 0.67–1.17)
EGFRCR SERPLBLD CKD-EPI 2021: >90 ML/MIN/1.73M2
GLUCOSE BLDC GLUCOMTR-MCNC: 112 MG/DL (ref 70–99)
GLUCOSE BLDC GLUCOMTR-MCNC: 114 MG/DL (ref 70–99)
GLUCOSE BLDC GLUCOMTR-MCNC: 98 MG/DL (ref 70–99)
GLUCOSE SERPL-MCNC: 90 MG/DL (ref 70–99)
HCO3 SERPL-SCNC: 24 MMOL/L (ref 22–29)
HOLD SPECIMEN: NORMAL
INR PPP: 2.46 (ref 0.85–1.15)
LDH SERPL L TO P-CCNC: 242 U/L (ref 0–250)
POTASSIUM SERPL-SCNC: 4.5 MMOL/L (ref 3.4–5.3)
PROT SERPL-MCNC: 6.5 G/DL (ref 6.4–8.3)
SODIUM SERPL-SCNC: 126 MMOL/L (ref 135–145)

## 2024-11-06 PROCEDURE — 120N000003 HC R&B IMCU UMMC

## 2024-11-06 PROCEDURE — 36415 COLL VENOUS BLD VENIPUNCTURE: CPT | Performed by: PHYSICIAN ASSISTANT

## 2024-11-06 PROCEDURE — 80053 COMPREHEN METABOLIC PANEL: CPT | Performed by: PHYSICIAN ASSISTANT

## 2024-11-06 PROCEDURE — 250N000013 HC RX MED GY IP 250 OP 250 PS 637: Performed by: NURSE PRACTITIONER

## 2024-11-06 PROCEDURE — 250N000013 HC RX MED GY IP 250 OP 250 PS 637: Performed by: INTERNAL MEDICINE

## 2024-11-06 PROCEDURE — 99207 PR INCOMPL DIAG INTERV-PSYCH TEST: CPT | Performed by: CLINICAL NEUROPSYCHOLOGIST

## 2024-11-06 PROCEDURE — 250N000013 HC RX MED GY IP 250 OP 250 PS 637: Performed by: PHYSICIAN ASSISTANT

## 2024-11-06 PROCEDURE — 93750 INTERROGATION VAD IN PERSON: CPT | Performed by: PHYSICIAN ASSISTANT

## 2024-11-06 PROCEDURE — 99233 SBSQ HOSP IP/OBS HIGH 50: CPT | Mod: 25 | Performed by: PHYSICIAN ASSISTANT

## 2024-11-06 PROCEDURE — 258N000003 HC RX IP 258 OP 636: Performed by: INTERNAL MEDICINE

## 2024-11-06 PROCEDURE — 85610 PROTHROMBIN TIME: CPT | Performed by: NURSE PRACTITIONER

## 2024-11-06 PROCEDURE — 97530 THERAPEUTIC ACTIVITIES: CPT | Mod: GP | Performed by: REHABILITATION PRACTITIONER

## 2024-11-06 PROCEDURE — 97535 SELF CARE MNGMENT TRAINING: CPT | Mod: GO

## 2024-11-06 PROCEDURE — 83615 LACTATE (LD) (LDH) ENZYME: CPT | Performed by: PHYSICIAN ASSISTANT

## 2024-11-06 RX ORDER — WARFARIN SODIUM 2.5 MG/1
2.5 TABLET ORAL
Status: COMPLETED | OUTPATIENT
Start: 2024-11-06 | End: 2024-11-06

## 2024-11-06 RX ORDER — DIGOXIN 125 MCG
125 TABLET ORAL EVERY 6 HOURS
Status: DISCONTINUED | OUTPATIENT
Start: 2024-11-06 | End: 2024-11-06

## 2024-11-06 RX ORDER — DIGOXIN 0.06 MG/1
62.5 TABLET ORAL EVERY 6 HOURS
Status: COMPLETED | OUTPATIENT
Start: 2024-11-07 | End: 2024-11-07

## 2024-11-06 RX ORDER — DIGOXIN 125 MCG
125 TABLET ORAL ONCE
Status: COMPLETED | OUTPATIENT
Start: 2024-11-06 | End: 2024-11-06

## 2024-11-06 RX ORDER — SODIUM CHLORIDE, SODIUM LACTATE, POTASSIUM CHLORIDE, CALCIUM CHLORIDE 600; 310; 30; 20 MG/100ML; MG/100ML; MG/100ML; MG/100ML
INJECTION, SOLUTION INTRAVENOUS
Status: COMPLETED
Start: 2024-11-06 | End: 2024-11-06

## 2024-11-06 RX ADMIN — AMLODIPINE BESYLATE 10 MG: 10 TABLET ORAL at 09:11

## 2024-11-06 RX ADMIN — ATORVASTATIN CALCIUM 80 MG: 80 TABLET, FILM COATED ORAL at 20:49

## 2024-11-06 RX ADMIN — HYDRALAZINE HYDROCHLORIDE 25 MG: 25 TABLET ORAL at 06:53

## 2024-11-06 RX ADMIN — MAGNESIUM OXIDE TAB 400 MG (241.3 MG ELEMENTAL MG) 400 MG: 400 (241.3 MG) TAB at 09:11

## 2024-11-06 RX ADMIN — WARFARIN SODIUM 2.5 MG: 2.5 TABLET ORAL at 18:52

## 2024-11-06 RX ADMIN — DIGOXIN 125 MCG: 125 TABLET ORAL at 18:52

## 2024-11-06 RX ADMIN — ESCITALOPRAM OXALATE 10 MG: 10 TABLET ORAL at 09:11

## 2024-11-06 RX ADMIN — GABAPENTIN 100 MG: 100 CAPSULE ORAL at 23:27

## 2024-11-06 RX ADMIN — SODIUM CHLORIDE, POTASSIUM CHLORIDE, SODIUM LACTATE AND CALCIUM CHLORIDE 500 ML: 600; 310; 30; 20 INJECTION, SOLUTION INTRAVENOUS at 14:16

## 2024-11-06 RX ADMIN — Medication 1 TABLET: at 09:11

## 2024-11-06 RX ADMIN — LOSARTAN POTASSIUM 50 MG: 50 TABLET, FILM COATED ORAL at 20:48

## 2024-11-06 RX ADMIN — LOSARTAN POTASSIUM 50 MG: 50 TABLET, FILM COATED ORAL at 09:10

## 2024-11-06 RX ADMIN — AMIODARONE HYDROCHLORIDE 200 MG: 200 TABLET ORAL at 09:11

## 2024-11-06 NOTE — PROGRESS NOTES
During VAD review session today, Duane was sitting up on his walker. He said he was dizzy. His MAP was 58, VAD numbers 5000 RPM, 3.9 flow, 2.0 PI, and 3.2 jackson. His speed was noted to be repeatedly dropping to the low speed limit of 4800.    Speed drops to the low speed limit can indicate a speed that is too fast, intravascular volume that is too low, worsening RV, or a ventricle that is small.    Anitha Forrest the PA notifed. She was planning to order some LR fluids for hypovolemia.

## 2024-11-06 NOTE — PROGRESS NOTES
The patient's HeartMate LVAD was interrogated 11/6/2024  * Speed 5000 rpm   * Pulsatility index 2.9   * Power 3.2 Dahl   * Flow 3.8 L/minute   Fluid status: hypovoelmic   Alarms were reviewed, and notable for very frequent pi events, no alarms.   The driveline exit site was inspected, c/d/i.   All external components were inspected and showed no evidence of damage or malfunction, none replaced.   No changes to VAD settings made

## 2024-11-06 NOTE — PLAN OF CARE
Neuro: A/Ox4. Forgetful. Needs reminders to be careful with LVAD at times.   Cardiac: A Flutter, with HR 80's. MAPS in 70's, No LVAD alarms.   Respiratory: O2 sats stable on RA 95%  GI/:  Voiding in urinal, No BM overnight.  Diet/appetite: Fair po, Regular diet.  Activity:  Up with SBA and walker. Walked halls x 1.  Pain: Denies pain.  Skin: Driveline dressing CDI  LDA's: LVAD, PIV sl'd    Plan: Continue with POC. Notify primary team with changes.

## 2024-11-06 NOTE — PROGRESS NOTES
"[id number]    74 year old male who presents from LVAD clinic with hypotension, dizziness, and new right arm weakness. Doppler MAP in clinic ranging 45-48. In reviewing the TCU chart, MAPS have been ranging from 60-84 with well controled HRs in the 50s-60s     Blood pressure 94/54, pulse 102, temperature 97.8  F (36.6  C), temperature source Oral, resp. rate 16, height 1.702 m (5' 7\"), weight 61.2 kg (135 lb), SpO2 100%.     Neuro: A&Ox4.   Cardiac: Afebrile, A-Flutter MAP 58-60, Bolus 500ml LR ordered. HM 3 LVAD, no alarms during the shift.  Respiratory: RA   GI/: Voiding spontaneously. 1 Lg BM this shift.   Diet/appetite: Tolerating reg diet. Denies nausea   Activity: Up SBA    Pain: Denies   Skin: Scattered   Lines: R PIV  Drains: None  Replacement: None      "

## 2024-11-06 NOTE — PROGRESS NOTES
[facility]   Cardiology II Service / Advanced Heart Failure  Daily Progress Note      Patient: Duane C Johnson  MRN: [id number]  Admission Date: 10/23/2024  Hospital Day # 14    Assessment and Plan: Duane C Johnson is a 74 year old male who presents from LVAD clinic with hypotension, dizziness, and new right arm weakness. Doppler MAP in clinic ranging 45-48. In reviewing the TCU chart, MAPS have been ranging from 60-84 with well controled HRs in the 50s-60s. For afterload his hydralazine was decreased yesterday from 100 mg q8h to 75 mg every 8 hours. He is still getting losartan 50 mg BID, although one dose was held yesterday. Last low flow alarm 10/19. He had several prolonged low flow alarms on 10/17 for which hydralazine was increased. CT-A was done on 10/17/24 with no outflow graft ostruction. The outflow graft does make an acute angle as it approaches the aorta. The patient's LVAD has had frequent low flow alarms over the course of his hospitalization with elevated MAPs.    Today's Plan:  - encourage PO intake with non-free water (ie juice, even soda, soups), encourage salty snacks  - 500 ml of LR this afternoon for hypotension, requested recheck of BP 30 minutes after the MAP of 58   - monitor MAP, goal MAP 65-85  - reduced hydralazine to 25 mg q8h with hod parameters. Favor leaving losartan and amldipine as if if able  - VAD re-education resume today, plan for another session tomorrow  - neuropysch eval today with additional testing on Friday as well  - Dr. Mulvihill to review scans again and let team know if anything to do from a surgical perspective    # Acute on chronic systolic heart failure secondary to ICM   # s/p HM3 LVAD as DT on 9/18/2024  # CAD s/p PCI  # History of STEMI  # s/p ICD 5/2024  # Acute angle of outflow fraft  Stage D. NYHA Class III.    10/23/24 RA 6, PA 40/14/20, PCW 9, Goldy CO/CI 4.54/2.59 at 5100 rpm    Fluid status: euvolemic   ACEi/ARB/ARNi: losartan 50  BID  Vasodilator: significant dizziness this AM with MAP 60 last evening on hydralazine 50 mg, reduce to 25 mg q8h and amlodipine 10 mg daily  BB: deferred due to recent VAD  Aldosterone antagonist: STOPPED blane due to hypoNa  SGLT2i: STOPPED empagliflozin 10 mg d/t propensity for hypovolemia  SCD prophylaxis: ICD  MAP: goal 70-90, lower today, encouraging pos and decreased hydralazine yesterday, will continue to ulitize hold parameters  LDH trends: stable 200s  Anticoagulation: warfarin dosing per pharmacy, INR goal 2-3  Antiplatelet: ASA not indicated in LVAD population, > 6 months s/p STEMI     # Low flow alarms, resolved  # Elevated PIs  # Labile MAPs   # Suspected orthostatic hypotension, resolved  Speed optimization echo performed 10/1, speed decreased to 5100. Low flows seems releated to hypertension and some hypovolemia as well. Have improved with re-timing hydralazine and after IV fluids. CT-A was done on  10/17/24 with no outflow graft ostruction. The outflow graft does make an acute angle as it approaches the aorta, discussed with Dr. Valentine, unlikely to be impacting the low flows at this time.  Having daily low flow alarms, always in the setting of htn. Mostly ~5 am, although has had some in the evening as well. They do not seem to be positional. RHC with reasonable filling pressures as above. Has been asymptomatic.  - Decreased speed to 5000 on 10/27  - Losartan 50 mg BID  - Hydralazine and amlodipine as above  - Stopped fludrocortisone 0.1 mg daily   - Increase PO intake as above, specifically right before bed, non free-water sources due to hypoNa  - Temp drop in hematocrit on monitor 10/29-10/30, low-flow controller change out 10/30, changed hematocrit back 10/31  - 500 ml of LR last night and repeating again today     # History of VT/VF arrest 2023  # AFib s/p DCCV 9/2024 and again 9/30/2024   Successfully cardioverted in early September for AF. Since then EKG suggested AF on 9/21. Tele is only  "available from as early as 9/22. The patient was started on hep gtt 9/21, but unclear if was in AF before this. While the patient was on hep gtt until INR was therapeutic, it is not possible to assess rhythm prior to 9/21. Systemic AC was off on 9/18-9/20. S/p MELBA and DCCV on 9/30 with conversion to sinus rhythm.  10/23 device interrogation shows persistent episode of AF since 10/10 with rates   - Continue amiodarone 200 mg daily  - Ongoing a fib- given ongoing low flows- deferring cardioversion for now given reasonable rate control. Will discuss adding digoxin as well  - AC as above     # Visual changes, resolved.   # Generalized weakness  # Headache  Stoke code called 9/29 for visual changes - right eye with decreased upper half of vision and bluish haze. Resolved after 30 minutes. Seen by opthalmology and neuro. Negative head CT and CTA head and neck. He has had 3 episodes which last about 5 minutes before resolving completely.      On 10/9 Stroke code activated due to concern of generalized weakness, numbness and headache. LKW reported as 0715 when he worked with therapies and shortly after started feeling generally weak. Then around 1500 he had onset of moderate \"tension\" type headache and tingling in the bilateral fingertips. Per RRT URIAH patient now reports tingling has resolved and headache is improving. MAP 48, INR 2.2. Given absence of focal deficits and rapidly improving symptoms, opted to cancel stroke.  CT head negative, though did show chronic maxillary sinusitis.      Stroke code called 10/23 when seen in clinic with report of new RUE weakness. He was notably hypotensive at this time with MAP ~45 with new right pronator drift. Evaluated in ED, CT and CTA head and neck negative. RUE weakness resolved.   - monitor for changes- no current symptoms     # Hypovolemia hyponatremia  Na 123  - encourage high sodium PO intake/gatorade and salty foods  - daily BMP    # Blood culture postivie for staph epi. " "1/2 bottles. No fevers. No symptoms.  - received one dose IV vanco, stopped given likely contaminant  - repeat blood culture pending 10/25 negative  - monitor    FEN: regular diet  PROPHY:  warfarin  LINES:  PIV  DISPO:  1-2 days pending VAD education   CODE STATUS:  Full Code    Time/Communication  I spent 50 minutes reviewing results, care team notes, meeting directly with the patient, coordinating care, and completing documentation on the day of service.     Patient discussed with Dr. Moshe Forrest Pa-C  Advanced Heart Failure/Cardiology II Service  Vocera preferred or Pager [phone]    ================================================================    Subjective/24-Hr Events:   Last 24 hr care team notes reviewed. No low flow alarms in several days. MAPs 60s-70s  in last 24 hrs. He feels dizzy when he is less than 65, but no dizziness as I am seeing him now.  Denies stroke symptoms or bleeding. Breathing feels well. No infectious symptoms or other concerns.    ROS:  4 point ROS including respiratory, CV, GI and  (other than that noted in the HPI) is negative.     Medications: Reviewed in EPIC.     Physical Exam:   BP 94/54 (BP Location: Right arm)   Pulse 80   Temp 97.3  F (36.3  C) (Oral)   Resp 18   Ht 1.702 m (5' 7\")   Wt 60.5 kg (133 lb 6.4 oz)   SpO2 98%   BMI 20.89 kg/m      GENERAL: Appears comfortable, in no distress.  HEENT: Eye symmetrical, no discharge or icterus bilaterally.   NECK: Supple, JVD below clavicle at 90 degrees.   CV: regular rate, irregular, +LVAD hum   RESPIRATORY: Respirations regular, even, and unlabored. Lungs CTA throughout.    GI: Soft and non distended. No tenderness. Normal bowel sounds present  EXTREMITIES: No peripheral edema. All extremities are warm and well perfused  NEUROLOGIC: Alert and interacting appropriatly.   MUSCULOSKELETAL: No joint swelling or tenderness.   SKIN: No jaundice. Driveline dressing c/d/i    Labs:  [facility]  Recent Labs   Lab " 11/06/24  1227 11/06/24  0805 11/06/24  0623 11/05/24  0512 11/04/24  0514 11/03/24  0539   NA  --   --  126* 123* 124* 126*   POTASSIUM  --   --  4.5 4.6 4.8 4.8   CHLORIDE  --   --  92* 91* 89* 92*   CO2  --   --  24 25 25 24   ANIONGAP  --   --  10 7 10 10   GLC 98 114* 90 88 93 86   BUN  --   --  28.0* 28.0* 34.0* 27.8*   CR  --   --  0.67 0.64* 0.69 0.67   GFRESTIMATED  --   --  >90 >90 >90 >90   PRANAV  --   --  8.8 9.0 8.7* 9.0   PROTTOTAL  --   --  6.5 6.3* 6.5 6.4   ALBUMIN  --   --  3.4* 3.3* 3.4* 3.4*   BILITOTAL  --   --  0.5 0.4 0.4 0.4   ALKPHOS  --   --  110 120 138 128   AST  --   --  41 40 42 42   ALT  --   --  44 45 50 45       CBC  Recent Labs   Lab 11/05/24  0512 11/03/24  0539 11/01/24  0522   WBC 5.5 6.4 5.1   RBC 3.46* 3.44* 3.43*   HGB 10.5* 10.4* 10.3*   HCT 31.9* 31.9* 31.6*   MCV 92 93 92   MCH 30.3 30.2 30.0   MCHC 32.9 32.6 32.6   RDW 15.8* 16.0* 16.0*    168 175       INR  Recent Labs   Lab 11/06/24  0623 11/05/24  0512 11/04/24  0514 11/03/24  0539   INR 2.46* 2.50* 2.77* 3.03*

## 2024-11-06 NOTE — PROGRESS NOTES
"I met with Duane today for about an hour to review VAD EDU materials. He seems to be remembering more from day to day now. For example, he can easily site the signs of infection at the driveline site and what the green pump is spinning light on his controller means. Often, he states that he does not learn things by rote memory or he will say he just doesn't remember. For example, he has not been able to remember that his INR goal is 2-3 and warfarin raises the INR level and makes his blood \"thinner\" to ensure the pump will not clot.     I started a new VAD parameters book for Duane today so he can write down his numbers daily (parameters, MAP, INR, weight) and compare them to the normals that are listed in the book.    His sodium level is improving. He said he has not felt confused at all for the past several days. Neuropsych started testing today but will finish on Friday.  "

## 2024-11-06 NOTE — PROGRESS NOTES
Duane C Johnson is a 74 year old year old male who is being evaluated via a billable video visit.       If the video visit is dropped, the invitation should be resent by [employer]  Will anyone else be joining your video visit?  no  Video Start Time: 1:30 pm     Video-Visit Details     Type of service:  Video Visit     Video End Time: 2:26 pm    Originating Location (pt. 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 He was unaccompanied during the interview.      This interview was completed via [employer], while the patient was in his hospital room.     BACKGROUND INFORMATION / INTERVIEW FINDINGS    Records indicate that Mr. Duane C Johnson suffered an ST elevated myocardial infarction on October 26, 2023.  He then had ventricular tachycardia ventricular fibrillation arrest the following day.  He was hospitalized through November 3, 2023.  He had multiple admissions for heart failure exacerbation. A neuropsychology consult was replaced by Dr. Sharon Meza following his cardiac arrest.   He was admitted on August 30, 2024 for congestive heart failure exacerbation.  He underwent LVAD placement on September 18, 2024.  He was transferred to the Acute Rehabilitation Unit on October 5 for rehabilitation.  He was unable to progress with his therapies and transitioned to the Transitional Care Unit October 17.  On October 23, 2024, he presented to cardiology clinic.  He was noted to have low mean arterial pressure and symptoms concerning for stroke.  He was sent to [facility] for further workup.  He was admitted.  A CT scan  of his head on October 24, 2024 documented no acute findings but did note mild periventricular white matter hypodensities that were felt to be likely due to chronic small vessel ischemic disease.  His other medical history includes anxiety, hypotension, prostate cancer, tobacco use, left inguinal hernia, atherosclerosis of the coronary artery, and hyperlipidemia.  During his current hospitalization, concerns have been expressed about his cognition.  Specifically, concerns are noted regarding his ability to care for himself and whether he needs 24/7 support.  We were contacted by Jessi Almendarez MSW, LGSW, APSW, to complete this neuropsychology exam.    In terms of cognitive workup, A SLUMS on October 3, 2024 was scored at 17/30.  An RBANS with Dev Ward SLP on October 6, 2024 documented deficits in immediate memory (4th %ile) and delayed memory (0.4th %ile).  He was diagnosed with mild cognitive impairment.    On interview, Mr. Aguila and MOISÉS initially discussed his heart health history.  He reported that he has had atrial fibrillation since he was 20 years old.  He indicated  He developed more issues with heart disease in the last 2 months.  He indicated that these issues came on suddenly.  He noted that he was scheduled to complete a hernia repair surgery, and then he woke up in a cold sweat on one morning.  He stated that his wife called 911 and he was taken to the hospital in an ambulance.  He noted that the LVAD was then placed.  He reported that he went to rehab.  He then met a doctor to discuss placement of a defibrillator.  He stated that he was then transferred back to [facility].  It is noteworthy that not all of these details directly correspond to the history listed in his chart.    In terms of next steps, Mr. Aguila reported that he plans to go home.  He stated that his home is set up for him to return.  He told me that his LVAD has not been activated.  He noted that the  defibrillator is not working, which he commented is not all that unusual.  I asked him about what he has to do to take care of himself with the LVAD. He stated that he will have to keep records and measurements from his LVAD.  He noted that he may have to call and if anything is unusual with these measurements.  He reported that his local hospital is in [place], but he may have to come down to [place] to receive his cares.    Regarding cognition, Mr. Aguila reported that he is a pretty smart person at baseline.  He acknowledged that he has had difficulties with retrieving names for 40 years.  He noted more difficulties with memory as he has gotten older.  He stated that he now has to take time to remember information.  He stated that if he receives detailed information, he does better.  He noted that he may misplace items but will sometimes find them later.  He reported that his attention abilities are good if he is interested.  He indicated that his thinking is probably slowed down.  He otherwise denied cognitive changes.  He denied that changes in his personality have been pointed out to him.    With respect to mental health, Mr. Aguila reported that his mood is almost always good.  I mention to him that anxiety is listed in his diagnoses in his chart.  He denied having any knowledge of being diagnosed with anxiety or being treated for anxiety.  He indicated that he has never had treatment for mental health.  I see in his chart that he is prescribed escitalopram.  He denied prior psychiatric hospitalization.  He noted that he has seen a couple of counselors over the years related to his work.  He denied hallucinations.  He stated that there have been occasions in which his upper right visual field becomes blue and opaque.  He noted that the same visual phenomenon sometimes occurs in his lower visual field as well.  He denies symptoms of severe mental illness.  He denied a history of trauma or abuse.  He  denied suicidal ideation or past suicide attempts.    With regard to other medical background, Mr. Aguila denied prior head injury or seizure.  He stated that he may have had a stroke episode when he was in the hospital.  He indicated that his sleep is great.  He noted that he is woken up often in the hospital, but is able to get back to sleep.  He reported that he might snore.  He denied symptoms of REM behavior disorder.  He denied pain or gross motor abnormalities.  He noted some age-related changes in his vision and hearing.  Per records, his current medications include:  Current Facility-Administered Medications   Medication Dose Route Frequency Provider Last Rate Last Admin    acetaminophen (TYLENOL) tablet 650 mg  650 mg Oral Q4H PRN Sirisha Arias APRN CNP        amiodarone (PACERONE) tablet 200 mg  200 mg Oral Daily Sirisha Arias APRN CNP   200 mg at 11/07/24 0914    amLODIPine (NORVASC) tablet 10 mg  10 mg Oral Daily Tamiko Moreira APRN CNP   10 mg at 11/07/24 0914    atorvastatin (LIPITOR) tablet 80 mg  80 mg Oral QPM Sirisha Arias APRN CNP   80 mg at 11/06/24 2049    [START ON 11/8/2024] digoxin (LANOXIN) tablet 62.5 mcg  62.5 mcg Oral Daily Torrie Forrest PA-C        escitalopram (LEXAPRO) tablet 10 mg  10 mg Oral or Feeding Tube Daily Sirisha Arias APRN CNP   10 mg at 11/07/24 0913    gabapentin (NEURONTIN) capsule 100 mg  100 mg Oral or Feeding Tube At Bedtime Sirisha Arias APRN CNP   100 mg at 11/06/24 2327    hydrALAZINE (APRESOLINE) injection 5 mg  5 mg Intravenous Q8H PRN Tamiko Moreira APRN CNP        hydrALAZINE (APRESOLINE) tablet 25 mg  25 mg Oral Q8H [facility] Torrie Forrest PA-C   25 mg at 11/07/24 0704    lidocaine (LMX4) cream   Topical Q1H PRN Sirisha Arias APRN CNP        lidocaine 1 % 0.1-1 mL  0.1-1 mL Other Q1H PRN Sirisha Arisa APRN CNP        losartan (COZAAR) tablet 50 mg  50 mg Oral BID Sirisha Arias APRN CNP   50 mg at 11/07/24 0914    magnesium hydroxide  (MILK OF MAGNESIA) suspension 30 mL  30 mL Oral Daily PRN Sirisha Arias APRN CNP        magnesium oxide (MAG-OX) tablet 400 mg  400 mg Oral Daily Sirisha Arias APRN CNP   400 mg at 11/07/24 0914    medication instruction   Does not apply Continuous PRN Sirisha Arias APRN CNP        melatonin tablet 5 mg  5 mg Oral At Bedtime PRN Carmen Navas MD   5 mg at 10/23/24 2321    methocarbamol (ROBAXIN) tablet 750 mg  750 mg Oral TID PRN Sirisha Arias APRN CNP        multivitamin w/minerals (THERA-VIT-M) tablet 1 tablet  1 tablet Oral Daily Sirisha Arias APRN CNP   1 tablet at 11/07/24 0914    Patient is already receiving anticoagulation with heparin, enoxaparin (LOVENOX), warfarin (COUMADIN)  or other anticoagulant medication   Does not apply Continuous PRN Sirisha Arias APRN CNP        sodium chloride (PF) 0.9% PF flush 3 mL  3 mL Intracatheter Q8H Sirisha Arias APRN CNP   3 mL at 11/06/24 2309    warfarin ANTICOAGULANT (COUMADIN) tablet 2.5 mg  2.5 mg Oral ONCE at 18:00 Aaron Mesa MD        Warfarin Dose Required Daily - Pharmacist Managed  1 each Oral See Admin Instructions Sirisha Arias APRN CNP         He stated that he may have occasionally consumed a beer in the recent past but otherwise denied substance use. He noted that he would drink up to a sixpack per day for about 20 years.  He used marijuana in the past.  He has never had treatment for substance issues.    He denied known family neurologic history.    Mr. Aguila reported that he lives at home with his wife on a [place] [facility].  He reported that family members live about 40 miles away and are able to provide some support.  His wife is still working.  His manages his own basic daily activities.  He stated he managed his medications on his own prior to coming to the hospital, but he acknowledges that he has more medications now.  He and his wife share management of the finances.  He prepares their meals.  He  drives.    By way of background, Mr. Aguila and his wife have been  for 54 years.  They do not have children.  Regarding educational background, he graduated from high school with good grades.  He stated that he started school at the [employer] but did not complete much coursework.  He noted that college was too rigid for him and he did well learning on his own.  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  He is retired.     BEHAVIORAL OBSERVATIONS  Mr. Aguila was pleasant and cooperative with the interview.  He was talkative during the interview and required some redirection.  His speech was otherwise unremarkable. His comprehension was normal. His thought processes were notable for forgetting.  There were instances in which he repeated himself during the interview. His mood was neutral with congruent affect.      IMPRESSIONS  A neuropsychological interview was completed in this visit.  There are notes from his heart team that he has had troubles with memory.  I also see objective testing through screening measure measures that memory deficits have been identified.  Mild forgetfulness was noted in my interview with the patient as well.  He appears to have limited insight into these cognitive changes.  We are planning to complete neuropsychological testing with him on the morning of November 8, 2024 while he remains in the hospital.  Full report to follow.     RECOMMENDATIONS     1.  Comprehensive in person neuropsychological testing will be completed on November 8, 2024 at 8:00 AM.     Samuel Zhu, Ph.D., L.P., ABPP  Board Certified in Clinical Neuropsychology   / Licensed Psychologist [id number]     Time spent:  One unit psychiatric diagnostic interview including interview and clinical assessment by licensed and board-certified neuropsychologist (CPT [id number]). Diagnoses: I50.20, R41.3, F06.8.

## 2024-11-06 NOTE — PROGRESS NOTES
VAD Social Work Services Progress Note    Date of Initial Social Work Evaluation: 09/05/2024   Collaborated with: Patient, VAD Coordinators, CARDS 2    Data: Duane continues to remain in the hospital following several low flow alarms and symptoms. Patient's medications have continued to be tweaked to navigate low flow alarms. Patient began to review VAD education with coordinator when Pt medically optimized, however patient not retaining education. Patient continuing to meet with coordinators for VAD education to support safe discharge.    Intervention: SW collaborated with VAD coordinators and CARDS 2 team to discuss Pt's cognition and potential need for ongoing 24-7 care. SW coordinated with Neuropsych to get evaluation scheduled for today, 11/6 at 1:30 to evaluate patient's cognition and to determine if additional recommendations arise. SW met with patient for supportive visit at the bedside. SW informed Pt of Neuropsych evaluation. Inquired into questions or concerns. Assessed coping.    Assessment: Patient sitting upright in bed and indicated he was just getting ready to go on a walk, though expressed some confusion because he was told he needed to wait for someone to support him. Pt agreeable to neuropsych evaluation. He indicated feeling he is remembering the things he needs to memorize for VAD education. Denied questions or concerns for SW at this time.  Education provided by SW: Ongoing SW availability, Neuropsych appointment  Plan:    Discharge Plans in Progress: Home with outpatient therapies    Barriers to d/c plan: VAD education    Follow up Plan: SW to continue to follow to support post-VAD psychosocial concerns. RNCC to set up outpatient therapy services.     Addendum 11/7: SW collaborated with Neuropsych to schedule additional testing. Appointment set for 11/8 (Friday) at 8AM. SW met with patient to inform him of appointment and to check in. Patient indicated feeling he was making progress with VAD  education. Pt denied questions surrounding neuropsych testing and was agreeable to completing. Bedside nurse, VAD coordinator, and URIAH notified of appointment.    Jessi Almendarez MSW, LGSW, APSW  Heart Transplant/[facility]   Teams/Vocera  Ph. [phone]

## 2024-11-06 NOTE — PROGRESS NOTES
Care Management Follow Up    Length of Stay (days): 14    Expected Discharge Date: 11/07/2024     Concerns to be Addressed: basic needs     Patient plan of care discussed at interdisciplinary rounds: Yes    Anticipated Discharge Disposition: Home, Home Care              Anticipated Discharge Services:    Anticipated Discharge DME: None    Patient/family educated on Medicare website which has current facility and service quality ratings: no  Education Provided on the Discharge Plan: Yes  Patient/Family in Agreement with the Plan: yes    Referrals Placed by CM/SW: Internal Clinic Care Coordination, Homecare  Private pay costs discussed: Not applicable    Discussed  Partnership in Safe Discharge Planning  document with patient/family: Yes:     Handoff Completed: No, handoff not indicated or clinically appropriate    Additional Information:  RNCC updated that patient not yet ready for discharge, plan for neuropsych to eval today, patient needing education/understanding of LVAD prior to discharge home.    Next Steps: RNCC following for discharge planning:    Discharge resources:     [facility]  Phone: [phone]  Fax: [phone]   RN/PT/OT update on discharge when ready     [facility]  [address] [place] [address]   [address]  Phone: [phone]  Fax: [phone]     Vendor to supply--     4 Wheeled Walker (already delivered)     Schedule INR for [facility] [place] when discharge ready.     call [facility] so they can get signature on paperwork prior to discharge. [phone]     SANDIE DelgadoN, BA, RN, CMSRN, RNCC  [facility]  Covering Units 6C Beds 9943-1449/OBS  Phone: [phone]  Available on Vocera search 6C 6502-14 RNCC or OBS RNCC  After Hours [phone]     6C Beds 4901-0824  Ph: [phone]     6B/CRNCC Weekend/holiday on Vocera or [phone]     [facility] RNCC ED/5 Ortho/5 Med/Surg [phone]     [facility] RNCC 6 Med/Surg  8A, 10 [facility] [phone]     Observation SW and weekend/after hours phone: [phone]

## 2024-11-07 ENCOUNTER — APPOINTMENT (OUTPATIENT)
Dept: PHYSICAL THERAPY | Facility: CLINIC | Age: 74
DRG: 286 | End: 2024-11-07
Payer: MEDICARE

## 2024-11-07 LAB
ALBUMIN SERPL BCG-MCNC: 3.4 G/DL (ref 3.5–5.2)
ALP SERPL-CCNC: 115 U/L (ref 40–150)
ALT SERPL W P-5'-P-CCNC: 50 U/L (ref 0–70)
ANION GAP SERPL CALCULATED.3IONS-SCNC: 10 MMOL/L (ref 7–15)
AST SERPL W P-5'-P-CCNC: 53 U/L (ref 0–45)
BILIRUB SERPL-MCNC: 0.4 MG/DL
BUN SERPL-MCNC: 33.1 MG/DL (ref 8–23)
CALCIUM SERPL-MCNC: 8.8 MG/DL (ref 8.8–10.4)
CHLORIDE SERPL-SCNC: 94 MMOL/L (ref 98–107)
CREAT SERPL-MCNC: 0.66 MG/DL (ref 0.67–1.17)
DIGOXIN SERPL-MCNC: <0.4 NG/ML (ref 0.6–2)
EGFRCR SERPLBLD CKD-EPI 2021: >90 ML/MIN/1.73M2
ERYTHROCYTE [DISTWIDTH] IN BLOOD BY AUTOMATED COUNT: 15.6 % (ref 10–15)
GLUCOSE BLDC GLUCOMTR-MCNC: 111 MG/DL (ref 70–99)
GLUCOSE BLDC GLUCOMTR-MCNC: 98 MG/DL (ref 70–99)
GLUCOSE SERPL-MCNC: 93 MG/DL (ref 70–99)
HCO3 SERPL-SCNC: 22 MMOL/L (ref 22–29)
HCT VFR BLD AUTO: 31.6 % (ref 40–53)
HGB BLD-MCNC: 10.3 G/DL (ref 13.3–17.7)
INR PPP: 2.24 (ref 0.85–1.15)
LDH SERPL L TO P-CCNC: 212 U/L (ref 0–250)
MCH RBC QN AUTO: 30.5 PG (ref 26.5–33)
MCHC RBC AUTO-ENTMCNC: 32.6 G/DL (ref 31.5–36.5)
MCV RBC AUTO: 94 FL (ref 78–100)
PLATELET # BLD AUTO: 176 10E3/UL (ref 150–450)
POTASSIUM SERPL-SCNC: 4.6 MMOL/L (ref 3.4–5.3)
PROT SERPL-MCNC: 6.5 G/DL (ref 6.4–8.3)
RBC # BLD AUTO: 3.38 10E6/UL (ref 4.4–5.9)
SODIUM SERPL-SCNC: 126 MMOL/L (ref 135–145)
WBC # BLD AUTO: 5 10E3/UL (ref 4–11)

## 2024-11-07 PROCEDURE — 85014 HEMATOCRIT: CPT | Performed by: NURSE PRACTITIONER

## 2024-11-07 PROCEDURE — 36415 COLL VENOUS BLD VENIPUNCTURE: CPT | Performed by: PHYSICIAN ASSISTANT

## 2024-11-07 PROCEDURE — 85041 AUTOMATED RBC COUNT: CPT | Performed by: NURSE PRACTITIONER

## 2024-11-07 PROCEDURE — 80053 COMPREHEN METABOLIC PANEL: CPT | Performed by: PHYSICIAN ASSISTANT

## 2024-11-07 PROCEDURE — 99233 SBSQ HOSP IP/OBS HIGH 50: CPT | Mod: 25 | Performed by: PHYSICIAN ASSISTANT

## 2024-11-07 PROCEDURE — 85610 PROTHROMBIN TIME: CPT | Performed by: NURSE PRACTITIONER

## 2024-11-07 PROCEDURE — 120N000003 HC R&B IMCU UMMC

## 2024-11-07 PROCEDURE — 83615 LACTATE (LD) (LDH) ENZYME: CPT | Performed by: PHYSICIAN ASSISTANT

## 2024-11-07 PROCEDURE — 250N000013 HC RX MED GY IP 250 OP 250 PS 637: Performed by: PHYSICIAN ASSISTANT

## 2024-11-07 PROCEDURE — 80162 ASSAY OF DIGOXIN TOTAL: CPT | Performed by: INTERNAL MEDICINE

## 2024-11-07 PROCEDURE — 250N000013 HC RX MED GY IP 250 OP 250 PS 637

## 2024-11-07 PROCEDURE — 93750 INTERROGATION VAD IN PERSON: CPT | Performed by: PHYSICIAN ASSISTANT

## 2024-11-07 PROCEDURE — 97530 THERAPEUTIC ACTIVITIES: CPT | Mod: GP | Performed by: REHABILITATION PRACTITIONER

## 2024-11-07 PROCEDURE — 250N000013 HC RX MED GY IP 250 OP 250 PS 637: Performed by: INTERNAL MEDICINE

## 2024-11-07 PROCEDURE — 36415 COLL VENOUS BLD VENIPUNCTURE: CPT | Performed by: INTERNAL MEDICINE

## 2024-11-07 PROCEDURE — 250N000013 HC RX MED GY IP 250 OP 250 PS 637: Performed by: NURSE PRACTITIONER

## 2024-11-07 RX ORDER — WARFARIN SODIUM 2.5 MG/1
2.5 TABLET ORAL
Status: COMPLETED | OUTPATIENT
Start: 2024-11-07 | End: 2024-11-07

## 2024-11-07 RX ORDER — DIGOXIN 0.06 MG/1
62.5 TABLET ORAL DAILY
Status: DISCONTINUED | OUTPATIENT
Start: 2024-11-08 | End: 2024-11-09 | Stop reason: HOSPADM

## 2024-11-07 RX ADMIN — LOSARTAN POTASSIUM 50 MG: 50 TABLET, FILM COATED ORAL at 09:14

## 2024-11-07 RX ADMIN — WARFARIN SODIUM 2.5 MG: 2.5 TABLET ORAL at 18:12

## 2024-11-07 RX ADMIN — HYDRALAZINE HYDROCHLORIDE 25 MG: 25 TABLET ORAL at 07:04

## 2024-11-07 RX ADMIN — LOSARTAN POTASSIUM 50 MG: 50 TABLET, FILM COATED ORAL at 20:20

## 2024-11-07 RX ADMIN — MAGNESIUM OXIDE TAB 400 MG (241.3 MG ELEMENTAL MG) 400 MG: 400 (241.3 MG) TAB at 09:14

## 2024-11-07 RX ADMIN — HYDRALAZINE HYDROCHLORIDE 25 MG: 25 TABLET ORAL at 22:29

## 2024-11-07 RX ADMIN — Medication 1 TABLET: at 09:14

## 2024-11-07 RX ADMIN — ATORVASTATIN CALCIUM 80 MG: 80 TABLET, FILM COATED ORAL at 20:20

## 2024-11-07 RX ADMIN — AMLODIPINE BESYLATE 10 MG: 10 TABLET ORAL at 09:14

## 2024-11-07 RX ADMIN — DIGOXIN 62.5 MCG: 0.06 TABLET ORAL at 07:04

## 2024-11-07 RX ADMIN — DIGOXIN 62.5 MCG: 0.06 TABLET ORAL at 01:27

## 2024-11-07 RX ADMIN — AMIODARONE HYDROCHLORIDE 200 MG: 200 TABLET ORAL at 09:14

## 2024-11-07 RX ADMIN — ESCITALOPRAM OXALATE 10 MG: 10 TABLET ORAL at 09:13

## 2024-11-07 RX ADMIN — HYDRALAZINE HYDROCHLORIDE 25 MG: 25 TABLET ORAL at 13:40

## 2024-11-07 RX ADMIN — GABAPENTIN 100 MG: 100 CAPSULE ORAL at 22:29

## 2024-11-07 NOTE — PLAN OF CARE
Problem: Ventricular Assist Device  Goal: Optimal Adjustment to Device  Outcome: Progressing     Goal Outcome Evaluation: No change    Plan of Care Reviewed With: patient, family    Overall Patient Progress: no changeOverall Patient Progress: no change    Outcome Evaluation: MAPS fell compared to yesterday. PI was intoelarant with activity from 3.6+ to 1.6. No LVAD alarms.Pt symptomatic. LR bolus given MAPS improved to 70.

## 2024-11-07 NOTE — PLAN OF CARE
Neuro: A/Ox4. Very forgetful. Difficulty with LVAD education. Bed alarm on.   Cardiac: A flutter, MAPS 68-70 overnight. Held Hydralazine at 2200. No LVAD alarms. Respiratory: O2 sats stable on RA  GI/: Voiding in urinal, large BM  Diet/appetite: Enc salty snacks and limit free water, gave chicken broth  Activity:  Up with SBA and walker  Pain: Denies pain.   Skin: See flowsheet  LDA's: Driveline dressing CDI, PIV sl'd.     Plan: Continue with POC. Notify primary team with changes.

## 2024-11-07 NOTE — PROGRESS NOTES
The patient's HeartMate LVAD was interrogated 11/7/2024  * Speed 5000 rpm   * Pulsatility index 8.4   * Power 3.2 Dahl   * Flow 3.0 L/minute   Fluid status: euvolemic to hypovolemic   Alarms were reviewed, and notable for frequent pi events but history goes back 24 hours.   The driveline exit site was inspected, c/d/i.   All external components were inspected and showed no evidence of damage or malfunction, none replaced.   No changes to VAD settings made

## 2024-11-07 NOTE — PROGRESS NOTES
"BP 94/54 (BP Location: Right arm)   Pulse 80   Temp 98.2  F (36.8  C) (Oral)   Resp 18   Ht 1.702 m (5' 7\")   Wt 60.5 kg (133 lb 6.4 oz)   SpO2 97%   BMI 20.89 kg/m      Neuro: A&Ox4. Forgetful, can be repetitive in asking questions.   Cardiac. LVAD HM3 hum,SR/ST to 110's MAPS recorded low today 58-62, team notified. Pt symptomatic for dizziness. LR bolus for 500 ml given. MAPS improved to 70's however PI's dropped from 3.2 to 1.6 during activity/PT walk. Walk halted. Pt reported no symptoms. No LVAD alarms.    Respiratory: O2 sats stable on RA 95%  GI/:  Voiding in urinal. LBM 11/6.  Diet/appetite: Fair po intake, regular diet.  Activity: Up with SBA and walker. Walked halls x 1 w/PT.  Pain: Denies pain.  Skin: Driveline dressing changed and CDI  LDA's: LVAD, PIV SL  Pertinent shift updates: LVAD training done today. Pt showed understanding, however tends to be forgetful and confuse cables when switching to batteries. Needs reminders and hand on assistance.  Plan: Continue with POC. Notify primary team with changes.  "

## 2024-11-07 NOTE — PROGRESS NOTES
Care Management Follow Up    Additional information    11/7 - CHW delegated by RNCC, has scheduled INR appointment for 11/12/24 in [place] at 2:15 PM, this was the earliest available.  stated pt is on the waitlist, but he should also call on 11/11 (the preferred date for appt) to see if any same-day appointments opened up.     Wrote this down and provided to pt, explained that he should call on the same day just to check. Pt said his wife won't be free to drive him until close to 3:00 PM and that's when he'll be able to call. I told him this is okay since we have an appointment on the books, so call on 11/11 whenever you know it will work, he expressed understanding.     Added instructions above to discharge instructions.     Pt had no further questions. RNCC notified. No further action taken.       Rachael John  Community Health Worker  6A, 6B, 6C  Ph [phone]  Fax [phone]

## 2024-11-07 NOTE — PROGRESS NOTES
[facility]   Cardiology II Service / Advanced Heart Failure  Daily Progress Note      Patient: Duane C Johnson  MRN: [id number]  Admission Date: 10/23/2024  Hospital Day # 15    Assessment and Plan: Duane C Johnson is a 74 year old male who presents from LVAD clinic with hypotension, dizziness, and new right arm weakness. Doppler MAP in clinic ranging 45-48. In reviewing the TCU chart, MAPS have been ranging from 60-84 with well controled HRs in the 50s-60s. For afterload his hydralazine was decreased yesterday from 100 mg q8h to 75 mg every 8 hours. He is still getting losartan 50 mg BID, although one dose was held yesterday. Last low flow alarm 10/19. He had several prolonged low flow alarms on 10/17 for which hydralazine was increased. CT-A was done on 10/17/24 with no outflow graft ostruction. The outflow graft does make an acute angle as it approaches the aorta. The patient's LVAD has had frequent low flow alarms over the course of his hospitalization with elevated MAPs.    Today's Plan:  - encourage PO intake with non-free water (ie juice, even soda, soups), encourage salty snacks  - monitor MAP, goal MAP 65-85  - reduced hydralazine to 25 mg q8h with hold parameters. Favor leaving losartan and amldipine as if if able  - VAD re-education resume today, plan for another session tomorrow  - neuropysch eval  additional testing on Friday as well  - Dr. Mulvihill to review scans again and let team know if anything to do from a surgical perspective    # Acute on chronic systolic heart failure secondary to ICM   # s/p HM3 LVAD as DT on 9/18/2024  # CAD s/p PCI  # History of STEMI  # s/p ICD 5/2024  # Acute angle of outflow fraft  Stage D. NYHA Class III.    10/23/24 RA 6, PA 40/14/20, PCW 9, Goldy CO/CI 4.54/2.59 at 5100 rpm    Fluid status: euvolemic   ACEi/ARB/ARNi: losartan 50 BID  Vasodilator: significant dizziness on hydralazine 50 mg, reduce to 25 mg q8h. continue amlodipine 10 mg daily  BB:  deferred due to recent VAD  Aldosterone antagonist: STOPPED blane due to hypoNa  SGLT2i: STOPPED empagliflozin 10 mg d/t propensity for hypovolemia  SCD prophylaxis: ICD  MAP: goal 70-90, encouraging POs ulitize hold parameters  LDH trends: stable 200s  Anticoagulation: warfarin dosing per pharmacy, INR goal 2-3, INR 2.24  Antiplatelet: ASA not indicated in LVAD population, > 6 months s/p STEMI     # Low flow alarms, resolved  # Elevated PIs  # Labile MAPs   # Suspected orthostatic hypotension, resolved  Speed optimization echo performed 10/1, speed decreased to 5100. Low flows seems releated to hypertension and some hypovolemia as well. Have improved with re-timing hydralazine and after IV fluids. CT-A was done on  10/17/24 with no outflow graft ostruction. The outflow graft does make an acute angle as it approaches the aorta, discussed with Dr. Valentine, unlikely to be impacting the low flows at this time.  Having daily low flow alarms, always in the setting of htn. Mostly ~5 am, although has had some in the evening as well. They do not seem to be positional. RHC with reasonable filling pressures as above. Has been asymptomatic.  - Decreased speed to 5000 on 10/27  - Losartan 50 mg BID  - Hydralazine and amlodipine as above  - Stopped fludrocortisone 0.1 mg daily   - Increase PO intake as above, specifically right before bed, non free-water sources due to hypoNa  - Temp drop in hematocrit on monitor 10/29-10/30, low-flow controller change out 10/30, changed hematocrit back 10/31  - did not require any IVF today     # History of VT/VF arrest 2023  # AFib s/p DCCV 9/2024 and again 9/30/2024   Successfully cardioverted in early September for AF. Since then EKG suggested AF on 9/21. Tele is only available from as early as 9/22. The patient was started on hep gtt 9/21, but unclear if was in AF before this. While the patient was on hep gtt until INR was therapeutic, it is not possible to assess rhythm prior to 9/21.  "Systemic AC was off on 9/18-9/20. S/p MELBA and DCCV on 9/30 with conversion to sinus rhythm.  10/23 device interrogation shows persistent episode of AF since 10/10 with rates   - Continue amiodarone 200 mg daily  - S/p digoxin load, continue 62.5 mcg daily  - Ongoing a fib- given ongoing low flows- deferring cardioversion for now given reasonable rate control. Will discuss adding digoxin as well  - AC as above     # Visual changes, resolved.   # Generalized weakness  # Headache  Stoke code called 9/29 for visual changes - right eye with decreased upper half of vision and bluish haze. Resolved after 30 minutes. Seen by opthalmology and neuro. Negative head CT and CTA head and neck. He has had 3 episodes which last about 5 minutes before resolving completely.      On 10/9 Stroke code activated due to concern of generalized weakness, numbness and headache. LKW reported as 0715 when he worked with therapies and shortly after started feeling generally weak. Then around 1500 he had onset of moderate \"tension\" type headache and tingling in the bilateral fingertips. Per RRT URIAH patient now reports tingling has resolved and headache is improving. MAP 48, INR 2.2. Given absence of focal deficits and rapidly improving symptoms, opted to cancel stroke.  CT head negative, though did show chronic maxillary sinusitis.      Stroke code called 10/23 when seen in clinic with report of new RUE weakness. He was notably hypotensive at this time with MAP ~45 with new right pronator drift. Evaluated in ED, CT and CTA head and neck negative. RUE weakness resolved.   - monitor for changes- no current symptoms     # Hypovolemia hyponatremia  Na 126  - encourage high sodium PO intake/gatorade and salty foods  - daily BMP    # Blood culture postivie for staph epi. 1/2 bottles. No fevers. No symptoms.  - received one dose IV vanco, stopped given likely contaminant  - repeat blood culture pending 10/25 negative  - monitor    FEN: regular " "diet  PROPHY:  warfarin  LINES:  PIV  DISPO:  1-2 days pending VAD education   CODE STATUS:  Full Code    Time/Communication  I spent 55 minutes reviewing results, care team notes, meeting directly with the patient, coordinating care, and completing documentation on the day of service.     Patient discussed with Dr. Moshe Forrest Pa-C  Advanced Heart Failure/Cardiology II Service  [place] preferred or Pager [phone]    ================================================================    Subjective/24-Hr Events:   Last 24 hr care team notes reviewed. No low flow alarms in several days. MAPs 60s-70s  in last 24 hrs. He feels dizzy when he is less than 65, but no dizziness as I am seeing him now (though recently had some when out walking). Has not needed IVF since yesterday.  Denies stroke symptoms or bleeding. Breathing feels well. No infectious symptoms or other concerns.    ROS:  4 point ROS including respiratory, CV, GI and  (other than that noted in the HPI) is negative.     Medications: Reviewed in EPIC.     Physical Exam:   BP 94/54 (BP Location: Right arm)   Pulse 85   Temp 97.5  F (36.4  C) (Oral)   Resp 16   Ht 1.702 m (5' 7\")   Wt 62.1 kg (136 lb 12.8 oz)   SpO2 97%   BMI 21.43 kg/m      GENERAL: Appears comfortable, in no distress.  HEENT: Eye symmetrical, no discharge or icterus bilaterally.   NECK: Supple, JVD below clavicle at 90 degrees.   CV: regular rate, irregular, +LVAD hum   RESPIRATORY: Respirations regular, even, and unlabored. Lungs CTA throughout.    GI: Soft and non distended. No tenderness. Normal bowel sounds present  EXTREMITIES: No peripheral edema. All extremities are warm and well perfused  NEUROLOGIC: Alert and interacting appropriatly.   MUSCULOSKELETAL: No joint swelling or tenderness.   SKIN: No jaundice. Driveline dressing c/d/i    Labs:  CMP  Recent Labs   Lab 11/07/24  1302 11/07/24  0804 11/07/24  0610 11/06/24  2138 11/06/24  0805 11/06/24  0623 " 11/05/24 0512 11/04/24 0514   NA  --   --  126*  --   --  126* 123* 124*   POTASSIUM  --   --  4.6  --   --  4.5 4.6 4.8   CHLORIDE  --   --  94*  --   --  92* 91* 89*   CO2  --   --  22  --   --  24 25 25   ANIONGAP  --   --  10  --   --  10 7 10   * 98 93 112*   < > 90 88 93   BUN  --   --  33.1*  --   --  28.0* 28.0* 34.0*   CR  --   --  0.66*  --   --  0.67 0.64* 0.69   GFRESTIMATED  --   --  >90  --   --  >90 >90 >90   PRANAV  --   --  8.8  --   --  8.8 9.0 8.7*   PROTTOTAL  --   --  6.5  --   --  6.5 6.3* 6.5   ALBUMIN  --   --  3.4*  --   --  3.4* 3.3* 3.4*   BILITOTAL  --   --  0.4  --   --  0.5 0.4 0.4   ALKPHOS  --   --  115  --   --  110 120 138   AST  --   --  53*  --   --  41 40 42   ALT  --   --  50  --   --  44 45 50    < > = values in this interval not displayed.       CBC  Recent Labs   Lab 11/07/24 0610 11/05/24 0512 11/03/24 0539 11/01/24 0522   WBC 5.0 5.5 6.4 5.1   RBC 3.38* 3.46* 3.44* 3.43*   HGB 10.3* 10.5* 10.4* 10.3*   HCT 31.6* 31.9* 31.9* 31.6*   MCV 94 92 93 92   MCH 30.5 30.3 30.2 30.0   MCHC 32.6 32.9 32.6 32.6   RDW 15.6* 15.8* 16.0* 16.0*    181 168 175       INR  Recent Labs   Lab 11/07/24 0610 11/06/24 0623 11/05/24 0512 11/04/24 0514   INR 2.24* 2.46* 2.50* 2.77*

## 2024-11-07 NOTE — PROGRESS NOTES
"Care Management Follow Up    Length of Stay (days): 15    Expected Discharge Date: 11/09/2024     Concerns to be Addressed: basic needs     Patient plan of care discussed at interdisciplinary rounds: Yes    Anticipated Discharge Disposition: Home, Home Care              Anticipated Discharge Services:    Anticipated Discharge DME: None    Patient/family educated on Medicare website which has current facility and service quality ratings: no  Education Provided on the Discharge Plan: Yes  Patient/Family in Agreement with the Plan: yes    Referrals Placed by CM/SW: Internal Clinic Care Coordination, Homecare  Private pay costs discussed: Not applicable    Discussed  Partnership in Safe Discharge Planning  document with patient/family: Yes:      Handoff Completed: Yes, [facility] PCP: Internal handoff referral completed    Additional Information:  RNCC received call from [facility] today regarding pt's already delivered 4WW. Per [facility], they are asking for a signed copy of \"delivery ticket\" (at writer's desk) to be sent on  and dated the day of patient discharge, faxed to [facility].     Discharge plan tentative for Saturday, 11/9/24, has HC PT/OT/RN set up with [facility].     Unit CHW was delegated with task of setting up INR appointment at [facility], which was booked for Monday (pt is on wait list), and so appointment is made for Tuesday, patient aware to call about getting earlier appointment if possible.    Next Steps: Update [facility] HC day of discharge, get [facility] paperwork signed and returned via fax day of discharge.    Discharge resources:     [facility]  Phone: [phone]  Fax: [phone]  HC RN/PT/OT update on discharge when ready      DME     [facility]  [address]   [address]  Phone: [phone]  Fax: [phone]     Vendor to supply--     4 Wheeled Walker (already delivered)    INR: [facility]     SANDIE DelgadoN, BA, RN, CMSRN, " RNCC  [facility]  Covering Units 6C Beds 6902-7335/OBS  Phone: [phone]  Available on Vocera search 6C 6502-14 RNCC or OBS RNCC  After Hours [phone]     6C Beds 7883-1178 SW Ph: [phone]     6B/CRNCC Weekend/holiday on Vocera or [phone]     [facility] RNCC ED/5 Ortho/5 Med/Surg [phone]     [facility] RNCC 6 Med/Surg 8A, [phone]     Observation SW and weekend/after hours phone: [phone]

## 2024-11-07 NOTE — PROGRESS NOTES
I reviewed VAD materials with Duane and his wife Melissa today. Duane is recalling information much better than earlier in the week. Melissa is able to answer most of my questions. Duane has the second neuropsych appointment appointment tomorrow followed by another review session with the VAD coordinator.

## 2024-11-08 ENCOUNTER — APPOINTMENT (OUTPATIENT)
Dept: PHYSICAL THERAPY | Facility: CLINIC | Age: 74
DRG: 286 | End: 2024-11-08
Payer: MEDICARE

## 2024-11-08 LAB
ALBUMIN SERPL BCG-MCNC: 3.3 G/DL (ref 3.5–5.2)
ALP SERPL-CCNC: 121 U/L (ref 40–150)
ALT SERPL W P-5'-P-CCNC: 52 U/L (ref 0–70)
ANION GAP SERPL CALCULATED.3IONS-SCNC: 10 MMOL/L (ref 7–15)
AST SERPL W P-5'-P-CCNC: 53 U/L (ref 0–45)
BILIRUB SERPL-MCNC: 0.4 MG/DL
BUN SERPL-MCNC: 29.7 MG/DL (ref 8–23)
CALCIUM SERPL-MCNC: 8.7 MG/DL (ref 8.8–10.4)
CHLORIDE SERPL-SCNC: 92 MMOL/L (ref 98–107)
CREAT SERPL-MCNC: 0.63 MG/DL (ref 0.67–1.17)
EGFRCR SERPLBLD CKD-EPI 2021: >90 ML/MIN/1.73M2
GLUCOSE SERPL-MCNC: 98 MG/DL (ref 70–99)
HCO3 SERPL-SCNC: 23 MMOL/L (ref 22–29)
INR PPP: 2.4 (ref 0.85–1.15)
LDH SERPL L TO P-CCNC: 227 U/L (ref 0–250)
POTASSIUM SERPL-SCNC: 4.7 MMOL/L (ref 3.4–5.3)
PROT SERPL-MCNC: 6.4 G/DL (ref 6.4–8.3)
SODIUM SERPL-SCNC: 125 MMOL/L (ref 135–145)

## 2024-11-08 PROCEDURE — 250N000013 HC RX MED GY IP 250 OP 250 PS 637: Performed by: NURSE PRACTITIONER

## 2024-11-08 PROCEDURE — 97116 GAIT TRAINING THERAPY: CPT | Mod: GP

## 2024-11-08 PROCEDURE — 120N000003 HC R&B IMCU UMMC

## 2024-11-08 PROCEDURE — 84155 ASSAY OF PROTEIN SERUM: CPT | Performed by: PHYSICIAN ASSISTANT

## 2024-11-08 PROCEDURE — 36415 COLL VENOUS BLD VENIPUNCTURE: CPT | Performed by: PHYSICIAN ASSISTANT

## 2024-11-08 PROCEDURE — 250N000013 HC RX MED GY IP 250 OP 250 PS 637: Performed by: PHYSICIAN ASSISTANT

## 2024-11-08 PROCEDURE — 96133 NRPSYC TST EVAL PHYS/QHP EA: CPT | Performed by: CLINICAL NEUROPSYCHOLOGIST

## 2024-11-08 PROCEDURE — 93750 INTERROGATION VAD IN PERSON: CPT | Performed by: PHYSICIAN ASSISTANT

## 2024-11-08 PROCEDURE — 85610 PROTHROMBIN TIME: CPT | Performed by: NURSE PRACTITIONER

## 2024-11-08 PROCEDURE — 96132 NRPSYC TST EVAL PHYS/QHP 1ST: CPT | Performed by: CLINICAL NEUROPSYCHOLOGIST

## 2024-11-08 PROCEDURE — 82247 BILIRUBIN TOTAL: CPT | Performed by: PHYSICIAN ASSISTANT

## 2024-11-08 PROCEDURE — 83615 LACTATE (LD) (LDH) ENZYME: CPT | Performed by: PHYSICIAN ASSISTANT

## 2024-11-08 PROCEDURE — 80051 ELECTROLYTE PANEL: CPT | Performed by: PHYSICIAN ASSISTANT

## 2024-11-08 PROCEDURE — 250N000013 HC RX MED GY IP 250 OP 250 PS 637: Performed by: STUDENT IN AN ORGANIZED HEALTH CARE EDUCATION/TRAINING PROGRAM

## 2024-11-08 PROCEDURE — 99233 SBSQ HOSP IP/OBS HIGH 50: CPT | Mod: 25 | Performed by: STUDENT IN AN ORGANIZED HEALTH CARE EDUCATION/TRAINING PROGRAM

## 2024-11-08 PROCEDURE — 90791 PSYCH DIAGNOSTIC EVALUATION: CPT | Performed by: CLINICAL NEUROPSYCHOLOGIST

## 2024-11-08 RX ORDER — WARFARIN SODIUM 2.5 MG/1
2.5 TABLET ORAL
Status: COMPLETED | OUTPATIENT
Start: 2024-11-08 | End: 2024-11-08

## 2024-11-08 RX ADMIN — GABAPENTIN 100 MG: 100 CAPSULE ORAL at 21:14

## 2024-11-08 RX ADMIN — Medication 1 TABLET: at 10:25

## 2024-11-08 RX ADMIN — HYDRALAZINE HYDROCHLORIDE 25 MG: 25 TABLET ORAL at 21:14

## 2024-11-08 RX ADMIN — MAGNESIUM OXIDE TAB 400 MG (241.3 MG ELEMENTAL MG) 400 MG: 400 (241.3 MG) TAB at 10:26

## 2024-11-08 RX ADMIN — ESCITALOPRAM OXALATE 10 MG: 10 TABLET ORAL at 10:26

## 2024-11-08 RX ADMIN — HYDRALAZINE HYDROCHLORIDE 25 MG: 25 TABLET ORAL at 05:47

## 2024-11-08 RX ADMIN — AMIODARONE HYDROCHLORIDE 200 MG: 200 TABLET ORAL at 10:25

## 2024-11-08 RX ADMIN — WARFARIN SODIUM 2.5 MG: 2.5 TABLET ORAL at 17:03

## 2024-11-08 RX ADMIN — LOSARTAN POTASSIUM 50 MG: 50 TABLET, FILM COATED ORAL at 21:14

## 2024-11-08 RX ADMIN — DIGOXIN 62.5 MCG: 0.06 TABLET ORAL at 10:26

## 2024-11-08 RX ADMIN — LOSARTAN POTASSIUM 50 MG: 50 TABLET, FILM COATED ORAL at 10:26

## 2024-11-08 RX ADMIN — AMLODIPINE BESYLATE 10 MG: 10 TABLET ORAL at 10:26

## 2024-11-08 RX ADMIN — HYDRALAZINE HYDROCHLORIDE 25 MG: 25 TABLET ORAL at 14:16

## 2024-11-08 RX ADMIN — ATORVASTATIN CALCIUM 80 MG: 80 TABLET, FILM COATED ORAL at 21:14

## 2024-11-08 NOTE — PROGRESS NOTES
[facility]   Cardiology II Service / Advanced Heart Failure  Daily Progress Note      Patient: Duane C Johnson  MRN: [id number]  Admission Date: 10/23/2024  Hospital Day # 16    Assessment and Plan: Duane C Johnson is a 74 year old male who presents from LVAD clinic with hypotension, dizziness, and new right arm weakness. Doppler MAP in clinic ranging 45-48. In reviewing the TCU chart, MAPS have been ranging from 60-84 with well controled HRs in the 50s-60s. For afterload his hydralazine was decreased yesterday from 100 mg q8h to 75 mg every 8 hours. He is still getting losartan 50 mg BID, although one dose was held yesterday. Last low flow alarm 10/19. He had several prolonged low flow alarms on 10/17 for which hydralazine was increased. CT-A was done on 10/17/24 with no outflow graft ostruction. The outflow graft does make an acute angle as it approaches the aorta. The patient's LVAD has had frequent low flow alarms over the course of his hospitalization with elevated MAPs.    Today's Plan:  - encourage PO intake with non-free water (ie juice, gatorade, even soda, soups), encourage salty snacks  - monitor MAP, goal MAP 65-85  - reduced hydralazine to 25 mg q8h with hold parameters. Favor leaving losartan and amlodipine as if if able  - Possible discharge tomorrow if he remains medically stable (flows, bps, and na) and we can arrange a dischage plan  - Was able to review neuropsych testing with wife over the phone, will need to review with patient as well prior to discharge. Wife aware NO driving until cleared by DMV or OT  - Will need to discuss with LVAD coordinator if there are any althernatives to 11/13 9:00 am appointment as that will be difficult for wife's work schedule (works from home so she is planning to continue working while being his 24/7 caregiver)  - Will need dig leval at 11/13 appointment  - Needs formal driving assessment arranged on discharge    # Acute on chronic systolic heart  failure secondary to ICM   # s/p HM3 LVAD as DT on 9/18/2024  # CAD s/p PCI  # History of STEMI  # s/p ICD 5/2024  # Acute angle of outflow fraft  Stage D. NYHA Class III.    10/23/24 RA 6, PA 40/14/20, PCW 9, Goldy CO/CI 4.54/2.59 at 5100 rpm    Fluid status: euvolemic   ACEi/ARB/ARNi: losartan 50 BID  Vasodilator: hydralazine 25 mg q8h.  amlodipine 10 mg daily  BB: deferred due to recent VAD  Aldosterone antagonist: STOPPED blane due to hypoNa  SGLT2i: STOPPED empagliflozin 10 mg d/t propensity for hypovolemia  SCD prophylaxis: ICD  MAP: goal 70-90, encouraging POs ulitize hold parameters  LDH trends: stable 200s  Anticoagulation: warfarin dosing per pharmacy, INR goal 2-3, INR 2.4  Antiplatelet: ASA not indicated in LVAD population, > 6 months s/p STEMI  Other:   - No fluid restriction, encourage drinks with Na/electrolytes, salty snacks  - Patient has a low flow controller, will not alarm until flow <2. Hematocrit is set to match his true hct  - Dr. Berg has reviewed inflow and outflow positioning, nothing to do     # Low flow alarms, resolved  # Elevated PIs  # Labile MAPs   # Suspected orthostatic hypotension, resolved  Speed optimization echo performed 10/1, speed decreased to 5100. Low flows seems releated to hypertension and some hypovolemia as well. Have improved with re-timing hydralazine and after IV fluids. CT-A was done on  10/17/24 with no outflow graft ostruction. The outflow graft does make an acute angle as it approaches the aorta, discussed with Dr. Valentine, unlikely to be impacting the low flows at this time.  Having daily low flow alarms, always in the setting of htn. Mostly ~5 am, although has had some in the evening as well. They do not seem to be positional. RHC with reasonable filling pressures as above. Has been asymptomatic.  - Decreased speed to 5000 on 10/27  - Losartan 50 mg BID  - Hydralazine and amlodipine as above  - Stopped fludrocortisone 0.1 mg daily   - Increase PO intake as  "above, specifically right before bed, non free-water sources due to hypoNa  - Temp drop in hematocrit on monitor 10/29-10/30, low-flow controller change out 10/30, changed hematocrit back 10/31  - did not require any IVF z48 giyrs     # History of VT/VF arrest 2023  # AFib s/p DCCV 9/2024 and again 9/30/2024   Successfully cardioverted in early September for AF. Since then EKG suggested AF on 9/21. Tele is only available from as early as 9/22. The patient was started on hep gtt 9/21, but unclear if was in AF before this. While the patient was on hep gtt until INR was therapeutic, it is not possible to assess rhythm prior to 9/21. Systemic AC was off on 9/18-9/20. S/p MELBA and DCCV on 9/30 with conversion to sinus rhythm.  10/23 device interrogation shows persistent episode of AF since 10/10 with rates   - Continue amiodarone 200 mg daily  - S/p digoxin load, continue 62.5 mcg daily  - Needs digoxin level at 11/13 appointment  - Ongoing a fib- given ongoing low flows- deferring cardioversion for now given reasonable rate control.   - AC as above    # Hypovolemia hyponatremia  Na 125  - encourage high sodium PO intake/gatorade and salty foods  - daily BMP     # Visual changes, resolved.   # Generalized weakness  # Headache  Stoke code called 9/29 for visual changes - right eye with decreased upper half of vision and bluish haze. Resolved after 30 minutes. Seen by opthalmology and neuro. Negative head CT and CTA head and neck. He has had 3 episodes which last about 5 minutes before resolving completely.      On 10/9 Stroke code activated due to concern of generalized weakness, numbness and headache. LKW reported as 0715 when he worked with therapies and shortly after started feeling generally weak. Then around 1500 he had onset of moderate \"tension\" type headache and tingling in the bilateral fingertips. Per RRT URIAH patient now reports tingling has resolved and headache is improving. MAP 48, INR 2.2. Given absence " of focal deficits and rapidly improving symptoms, opted to cancel stroke.  CT head negative, though did show chronic maxillary sinusitis.      Stroke code called 10/23 when seen in clinic with report of new RUE weakness. He was notably hypotensive at this time with MAP ~45 with new right pronator drift. Evaluated in ED, CT and CTA head and neck negative. RUE weakness resolved.   - monitor for changes- no current symptoms     # Blood culture postivie for staph epi. 1/2 bottles. No fevers. No symptoms.  - received one dose IV vanco, stopped given likely contaminant  - repeat blood culture pending 10/25 negative  - monitor    # Cognitive changes  - Formal neuropsych testing this admission reveiling some cognitive changes  - 24/7 care during his first 30 days, will likely not need therafter, but continue to assess with each LVAD appointment (per Ho Méndez note from 11/8 Duane has passed his VAD education)  - Needs formal driving assessment with DMV or OT prior to being cleared to drive, wife aware  - Repeat neuropsych testing in 1 year  - Could consider behvioral neurology on discharge as well given cognitive abnormalities     FEN: regular diet, no fluid restriction  PROPHY:  warfarin  LINES:  PIV  DISPO:  1-2 days pending VAD education   CODE STATUS:  Full Code    Time/Communication  I spent 80 minutes reviewing results, care team notes, meeting directly with the patient, coordinating care, and completing documentation on the day of service.     Patient discussed with Dr. Maria Isabel Forrest Pa-C  Advanced Heart Failure/Cardiology II Service  Vocera preferred or Pager [phone]    ================================================================    Subjective/24-Hr Events:   Last 24 hr care team notes reviewed. No low flow alarms in several days. MAPs 0s  in last 24 hrs. He feels dizzy when he is less than 65, but no dizziness since early yesterday morning. Has not needed IVF since Wednesday.   "Denies stroke symptoms or bleeding. Breathing feels well. No infectious symptoms or other concerns. Feeling very well and anxious to discharge.     ROS:  4 point ROS including respiratory, CV, GI and  (other than that noted in the HPI) is negative.     Medications: Reviewed in EPIC.     Physical Exam:   BP 94/54 (BP Location: Right arm)   Pulse 81   Temp 97.5  F (36.4  C) (Oral)   Resp 16   Ht 1.702 m (5' 7\")   Wt 62.1 kg (136 lb 12.8 oz)   SpO2 100%   BMI 21.43 kg/m      GENERAL: Appears comfortable, in no distress.  HEENT: Eye symmetrical, no discharge or icterus bilaterally.   NECK: Supple, JVD below clavicle at 90 degrees.   CV: regular rate, irregular, +LVAD hum   RESPIRATORY: Respirations regular, even, and unlabored. Lungs CTA throughout.    GI: Soft and non distended. No tenderness. Normal bowel sounds present  EXTREMITIES: No peripheral edema. All extremities are warm and well perfused  NEUROLOGIC: Alert and interacting appropriatly.   MUSCULOSKELETAL: No joint swelling or tenderness.   SKIN: No jaundice. Driveline dressing c/d/i    Labs:  CMP  Recent Labs   Lab 11/08/24  0609 11/07/24  1302 11/07/24  0804 11/07/24  0610 11/06/24  0805 11/06/24  0623 11/05/24  0512   *  --   --  126*  --  126* 123*   POTASSIUM 4.7  --   --  4.6  --  4.5 4.6   CHLORIDE 92*  --   --  94*  --  92* 91*   CO2 23  --   --  22  --  24 25   ANIONGAP 10  --   --  10  --  10 7   GLC 98 111* 98 93   < > 90 88   BUN 29.7*  --   --  33.1*  --  28.0* 28.0*   CR 0.63*  --   --  0.66*  --  0.67 0.64*   GFRESTIMATED >90  --   --  >90  --  >90 >90   PRANAV 8.7*  --   --  8.8  --  8.8 9.0   PROTTOTAL 6.4  --   --  6.5  --  6.5 6.3*   ALBUMIN 3.3*  --   --  3.4*  --  3.4* 3.3*   BILITOTAL 0.4  --   --  0.4  --  0.5 0.4   ALKPHOS 121  --   --  115  --  110 120   AST 53*  --   --  53*  --  41 40   ALT 52  --   --  50  --  44 45    < > = values in this interval not displayed.       CBC  Recent Labs   Lab 11/07/24  0610 11/05/24  0512 " 11/03/24  0539   WBC 5.0 5.5 6.4   RBC 3.38* 3.46* 3.44*   HGB 10.3* 10.5* 10.4*   HCT 31.6* 31.9* 31.9*   MCV 94 92 93   MCH 30.5 30.3 30.2   MCHC 32.6 32.9 32.6   RDW 15.6* 15.8* 16.0*    181 168       INR  Recent Labs   Lab 11/08/24  0609 11/07/24  0610 11/06/24  0623 11/05/24  0512   INR 2.40* 2.24* 2.46* 2.50*

## 2024-11-08 NOTE — PLAN OF CARE
Temp: 97.4  F (36.3  C) Temp src: Oral   Pulse: 87   Resp: 16 SpO2: 97 % O2 Device: None (Room air)       Hours of care: 1470-0195    D: Presented 10/23/24 from LVAD clinic with hypotension, dizziness, and new right arm weakness - now resolved.     I/A: Duane (he/him) is A/O x4, forgetful. Calls appropriately, calm and cooperative. Tele in place, Aflutter, HR 80s-90s. VSS on RA. R PIV in place SL. Scattered bruising and scabs, driveline site, R arm laceration. No new skin deficits noted. Tolerating regular diet with supplemental drinks. Up SBA/Ind in room. Adequate UOP, no BM this shift. Appeared to sleep well overnight.     Changed:  Running:   PRN:     P: Continue to monitor and follow POC. Continue with VAD education and plan to see Neuropsych this AM. Notify CARDS 2 with changes.    Goal Outcome Evaluation:    Plan of Care Reviewed With: patient  Overall Patient Progress: improving  Outcome Evaluation: MAPs stable. LVAD numbers WNL, no alarms overnight. Continue with VAD teaching.

## 2024-11-08 NOTE — PROGRESS NOTES
CLINICAL NUTRITION SERVICES - REASSESSMENT NOTE     Nutrition Prescription    Malnutrition Status:    malnutrition in the context of chronic illness    Recommendations already ordered by Registered Dietitian (RD):  Continue Ensure Enlive/plus with meals     Future/Additional Recommendations:  -- monitor oral intake of meals and supplements  -- monitor weight trends      EVALUATION OF THE PROGRESS TOWARD GOALS   Diet: Regular with ensure enlive with meals and cottage cheese with meals   Intake: per nursing documentation patient's intake is variable poor to fair       Duane reports his appetite is fair. He reports to drinking 3 ensure enlive daily, 2 mountain dews and drinking gatorade. He also states is is ordering mashed potatoes and chicken noodle soup twice daily. Patient without teeth.     Ensure Enlive/Ensure plus (250 kcals and 16-20 grams of protein per 1 carton)     NEW FINDINGS   Labs (11/8): Na 125 mmol/L (L), BUN 29.7 mg/dL (H), Cr 0.63 mg/dL (L)    Meds:  Lipitor  Mag-ox  Thera-vit-m    GI: LBM (11/7)    Weight: weight is decreased. Newest weight is 62.1 kg (11/7). Lowest weight this admission 60.4 kg (11/2).     MALNUTRITION  % Intake: Decreased intake does not meet criteria  % Weight Loss: Unable to assess  Subcutaneous Fat Loss: Facial region:  severe  Muscle Loss: Thoracic region (clavicle, acromium bone, deltoid, trapezius, pectoral):  severe   Fluid Accumulation/Edema: Does not meet criteria  Malnutrition Diagnosis: Severe malnutrition in the context of chronic illness    Previous Goals   Patient to consume % of nutritionally adequate meal trays TID, or the equivalent with supplements/snacks.  Evaluation: Not met    Previous Nutrition Diagnosis  Inadequate oral intake   Evaluation: Improving    CURRENT NUTRITION DIAGNOSIS  Inadequate oral intake related to difficulty chewing, variable appetite as evidenced by patient self report      INTERVENTIONS  Implementation  Continue current nutrition  supplement order    Goals  Patient to consume % of nutritionally adequate meal trays TID, or the equivalent with supplements/snacks.    Monitoring/Evaluation  Progress toward goals will be monitored and evaluated per protocol.    Sindy Walker MS/RD/LD/CNSC  6C (beds 6592-9617) + 7C (beds 2207-2625) + ED   Available in Vocera by name or unit dietitian    ** Clinical Dietitians no longer available on pager

## 2024-11-08 NOTE — PROGRESS NOTES
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 He was evaluated at bedside    BACKGROUND INFORMATION / INTERVIEW FINDINGS    I interviewed Mr. Aguila via a video visit on November 6, 2024.  The following information (in italics) is copied from my note on that date.    Records indicate that Mr. Duane C Johnson suffered an ST elevated myocardial infarction on October 26, 2023.  He then had ventricular tachycardia ventricular fibrillation arrest the following day.  He was hospitalized through November 3, 2023.  He had multiple admissions for heart failure exacerbation. A neuropsychology consult was replaced by Dr. Sharon Meza following his cardiac arrest.   He was admitted on August 30, 2024 for congestive heart failure exacerbation.  He underwent LVAD placement on September 18, 2024.  He was transferred to the Acute Rehabilitation Unit on October 5 for rehabilitation.  He was unable to progress with his therapies and transitioned to the Transitional Care Unit October 17.  On October 23, 2024, he presented to cardiology clinic.  He was noted to have low mean arterial pressure and symptoms concerning for stroke.  He was sent to [facility] ER for further workup.  He was admitted.  A CT scan of his head on October 24, 2024 documented no acute findings but did note mild periventricular white matter hypodensities that were felt to be likely due to chronic small vessel ischemic disease.  His other medical history includes anxiety, hypotension, prostate cancer, tobacco use, left inguinal hernia, atherosclerosis of the coronary artery, and hyperlipidemia.  During his current hospitalization, concerns have been expressed about his cognition.  Specifically, concerns are  noted regarding his ability to care for himself and whether he needs 24/7 support.  We were contacted by Jessi Almendarez, MSW, LGSW, APSW, to complete this neuropsychology exam.     In terms of cognitive workup, A SLUMS on October 3, 2024 was scored at 17/30.  An RBANS with Dev Ward SLP on October 6, 2024 documented deficits in immediate memory (4th %ile) and delayed memory (0.4th %ile).  He was diagnosed with mild cognitive impairment.     On interview, Mr. Aguila and I initially discussed his heart health history.  He reported that he has had atrial fibrillation since he was 20 years old.  He indicated  He developed more issues with heart disease in the last 2 months.  He indicated that these issues came on suddenly.  He noted that he was scheduled to complete a hernia repair surgery, and then he woke up in a cold sweat on one morning.  He stated that his wife called 911 and he was taken to the hospital in an ambulance.  He noted that the LVAD was then placed.  He reported that he went to rehab.  He then met a doctor to discuss placement of a defibrillator.  He stated that he was then transferred back to [facility].  It is noteworthy that not all of these details directly correspond to the history listed in his chart.     In terms of next steps, Mr. Aguila reported that he plans to go home.  He stated that his home is set up for him to return.  He told me that his LVAD has not been activated.  He noted that the defibrillator is not working, which he commented is not all that unusual.  I asked him about what he has to do to take care of himself with the LVAD. He stated that he will have to keep records and measurements from his LVAD.  He noted that he may have to call and if anything is unusual with these measurements.  He reported that his local hospital is in [place], but he may have to come down to [place] to receive his cares.     Regarding cognition, Mr. Aguila reported  that he is a pretty smart person at baseline.  He acknowledged that he has had difficulties with retrieving names for 40 years.  He noted more difficulties with memory as he has gotten older.  He stated that he now has to take time to remember information.  He stated that if he receives detailed information, he does better.  He noted that he may misplace items but will sometimes find them later.  He reported that his attention abilities are good if he is interested.  He indicated that his thinking is probably slowed down.  He otherwise denied cognitive changes.  He denied that changes in his personality have been pointed out to him.     With respect to mental health, Mr. Aguila reported that his mood is almost always good.  I mention to him that anxiety is listed in his diagnoses in his chart.  He denied having any knowledge of being diagnosed with anxiety or being treated for anxiety.  He indicated that he has never had treatment for mental health.  I see in his chart that he is prescribed escitalopram.  He denied prior psychiatric hospitalization.  He noted that he has seen a couple of counselors over the years related to his work.  He denied hallucinations.  He stated that there have been occasions in which his upper right visual field becomes blue and opaque.  He noted that the same visual phenomenon sometimes occurs in his lower visual field as well.  He denies symptoms of severe mental illness.  He denied a history of trauma or abuse.  He denied suicidal ideation or past suicide attempts.     With regard to other medical background, Mr. Aguila denied prior head injury or seizure.  He stated that he may have had a stroke episode when he was in the hospital.  He indicated that his sleep is great.  He noted that he is woken up often in the hospital, but is able to get back to sleep.  He reported that he might snore.  He denied symptoms of REM behavior disorder.  He denied pain or gross motor abnormalities.  He  noted some age-related changes in his vision and hearing.  Per records, his current medications include:            Current Facility-Administered Medications   Medication Dose Route Frequency Provider Last Rate Last Admin    acetaminophen (TYLENOL) tablet 650 mg  650 mg Oral Q4H PRN Sirisha Arias APRN CNP        amiodarone (PACERONE) tablet 200 mg  200 mg Oral Daily Sirisha Arias APRN CNP   200 mg at 11/07/24 0914    amLODIPine (NORVASC) tablet 10 mg  10 mg Oral Daily Tamiko Moreira APRN CNP   10 mg at 11/07/24 0914    atorvastatin (LIPITOR) tablet 80 mg  80 mg Oral QPM Sirisha Arias APRN CNP   80 mg at 11/06/24 2049    [START ON 11/8/2024] digoxin (LANOXIN) tablet 62.5 mcg  62.5 mcg Oral Daily Torrie Forrest PA-C        escitalopram (LEXAPRO) tablet 10 mg  10 mg Oral or Feeding Tube Daily Sirisha Arias APRN CNP   10 mg at 11/07/24 0913    gabapentin (NEURONTIN) capsule 100 mg  100 mg Oral or Feeding Tube At Bedtime Sirisha Arias APRN CNP   100 mg at 11/06/24 2327    hydrALAZINE (APRESOLINE) injection 5 mg  5 mg Intravenous Q8H PRN Tamiko Moreira APRN CNP        hydrALAZINE (APRESOLINE) tablet 25 mg  25 mg Oral Q8H [facility] Torrie Forrest PA-C   25 mg at 11/07/24 0704    lidocaine (LMX4) cream   Topical Q1H PRN Sirisha Arias APRN CNP        lidocaine 1 % 0.1-1 mL  0.1-1 mL Other Q1H PRN Sirisha Arias APRN CNP        losartan (COZAAR) tablet 50 mg  50 mg Oral BID Sirisha Arias APRN CNP   50 mg at 11/07/24 0914    magnesium hydroxide (MILK OF MAGNESIA) suspension 30 mL  30 mL Oral Daily PRN Sirisha Arias APRN CNP        magnesium oxide (MAG-OX) tablet 400 mg  400 mg Oral Daily Sirisha Arias APRN CNP   400 mg at 11/07/24 0914    medication instruction   Does not apply Continuous PRN Sirisha Arias APRN CNP        melatonin tablet 5 mg  5 mg Oral At Bedtime PRN Carmen Navas MD   5 mg at 10/23/24 9533    methocarbamol (ROBAXIN) tablet 750 mg  750 mg Oral TID PRN Sirisha Arias, APRN  CNP        multivitamin w/minerals (THERA-VIT-M) tablet 1 tablet  1 tablet Oral Daily Sirisha Arias APRN CNP   1 tablet at 11/07/24 0914    Patient is already receiving anticoagulation with heparin, enoxaparin (LOVENOX), warfarin (COUMADIN)  or other anticoagulant medication   Does not apply Continuous PRN Sirisha Arias APRN CNP        sodium chloride (PF) 0.9% PF flush 3 mL  3 mL Intracatheter Q8H Sirisha Arias APRN CNP   3 mL at 11/06/24 2309    warfarin ANTICOAGULANT (COUMADIN) tablet 2.5 mg  2.5 mg Oral ONCE at 18:00 Aaron Mesa MD        Warfarin Dose Required Daily - Pharmacist Managed  1 each Oral See Admin Instructions Sirisha Arias APRN CNP          He stated that he may have occasionally consumed a beer in the recent past but otherwise denied substance use. He noted that he would drink up to a sixpack per day for about 20 years.  He used marijuana in the past.  He has never had treatment for substance issues.     He denied known family neurologic history.     Mr. Aguila reported that he lives at home with his wife on a farm [place] [facility].  He reported that family members live about 40 miles away and are able to provide some support.  His wife is still working.  His manages his own basic daily activities.  He stated he managed his medications on his own prior to coming to the hospital, but he acknowledges that he has more medications now.  He and his wife share management of the finances.  He prepares their meals.  He drives.     By way of background, Mr. Aguila and his wife have been  for 54 years.  They do not have children.  Regarding educational background, he graduated from high school with good grades.  He stated that he started school at the [employer] but did not complete much coursework.  He noted that college was too rigid for him and he did well learning on his own.  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  He is retired.    He underwent testing at bedside on the current date.    BEHAVIORAL OBSERVATIONS  Mr. Aguila was alert and cooperative with the exam.  He wore glasses.  He was noted to be lean and slightly jaundiced.  His speech was normal. His comprehension was normal. His thought processes were notable for mild slowing and forgetting.  Insight was also reduced, as evidenced by his limited appreciation of his cognitive abnormalities and the effects that those abnormalities may have on his functioning.  His mood was neutral with congruent affect. His effort was good. The current results are felt to be an accurate depiction of his cognitive functioning.      RESULTS OF EXAM  His performances on standardized measures of neuropsychological functioning were as follows.     He was oriented to place and time.  He did not provide his home address accurately but was otherwise oriented to personal information.  He was able to state the names of the current president and the 2 most recent past presidents.  He provided semantically accurate information about the third most recent past president but was unable to retrieve his name.  He was not able to provide the names of other presidents who had served in office since 1988.  Performance on a measure of single word reading was average.  He obtained a passing score on a stand-alone measure of cognitive performance validity and on some embedded metrics of cognitive performance validity.  Auditory attention for digits was average.  Learning of words in a list format was low average.  Delayed recall of list words was exceptionally low, as he was unable to recollect any of the list words.  Percent retention of list words was exceptionally low.  Delayed recognition of list words was exceptionally low.  Learning of simple geometric shapes and their spatial locations was below average.  Delayed recall of the shapes and their locations was below  average, normatively, although he did not recollect any of the shapes.  Percent retention of the shapes was exceptionally low.  Delayed recognition of the shapes was low average.  Nonverbal reasoning for incomplete matrices was high average.  His drawing of a complicated geometric figure was low average and notable for inattention to a few of the figure's details.  Comprehension of phrases and short stories was exceptionally low.  Verbal associative fluency was low average.  Animal fluency was low average.  Naming to confrontation was high average.  Verbal abstract reasoning was average.  Speeded visual sequencing under focused attention was low average.  A similar measure with a divided attention component was exceptionally low and discontinued at the test's time limit.  He made 5 errors on this task and was unable to complete the measure.  He obtained an exceptionally high score on a measure of verbal reasoning for real world scenarios.    He endorsed items consistent with minimal symptoms of depression and minimal symptoms of anxiety on self-report measures.    IMPRESSIONS  Mr. Aguila demonstrated deficits that are consistent with dysfunction of mesial temporal regions, with milder compromise of lateral temporal and frontal lobe circuitry.  These findings are consistent with Alzheimer's disease or possibly mixed pathology.  At this point, based on his reported independence with instrumental daily activities, it is accurate use label mild cognitive impairment.  However, my suspicion is that he is on the cusp of a mild dementia syndrome.  Testing demonstrates significant impairments anterograde memory, with milder weaknesses in learning, semantic fluency, phonemic fluency, and executive functioning.  He also has impaired insight.  Other cognitive abilities are normal and performed in keeping with his average to high average range cognitive baseline.  He is not reporting elevated symptoms of depression or  anxiety.    With respect to the referral questions, I do not think that Mr. Aguila is capable of independently caring for himself.  He has significant deficits in memory, with abnormalities in executive functioning, learning, and some aspects of language.  He also has impaired insight.  When you couple of these cognitive abnormalities with his significant medical care needs, it is a recipe for errors in caring in for himself.  He requires support.  Whether his wife is capable of providing the support, I cannot say.    RECOMMENDATIONS    1.  As noted above, I think Mr. Aguila requires support.  I do not think he is capable of caring for himself.  He may not need 24/7 support at this time, but will certainly need oversight of his medications and assistance with managing his LVAD.    2.  If he notices ongoing issues with memory, routine use of a memory notebook or other assistive device could be of benefit.    3.  His conversational abilities and lack of insight belie the severity of his cognitive deficits.  Those caring for the patient should be aware of his deficits and take steps to ensure that he receives appropriate support.    4.  He has deficits on measures that are empirically associated with driving safety.  I am concerned about his ability to operate a vehicle.  If he wants to continue doing so, I would recommend that he complete a behind the wheel evaluation with the DMV.    5.  A consultation with Neurology might be considered.  Specifically, behavioral neurology would be ideal, given his cognitive abnormalities.    6. Follow-up neuropsychological evaluation is recommended in 1 year in order to assess and update recommendations as appropriate.  The current results can be used as a baseline at that time.    Samuel Zhu, Ph.D., L.P., ABPP  Board Certified in Clinical Neuropsychology   / Licensed Psychologist [id number]    Time spent:  One unit (60 minutes) neuropsychological testing  evaluation by licensed and board-certified neuropsychologist, including integration of patient data, interpretation of standardized test results and clinical data, clinical decision-making, treatment planning, and report, first hour (CPT 69351). One unit(s) (35 minutes) of neuropsychological testing evaluation by licensed and board-certified neuropsychologist, including integration of patient data, interpretation of standardized test results and clinical data, clinical decision-making, treatment planning, and report, subsequent hours (CPT 02330). One unit (30 minutes) of psychological and neuropsychological test administration and scoring by technician, first 30 minutes (CPT 44191). Three units (95 minutes) psychological or neuropsychological test administration and scoring by technician, subsequent 30 minutes (CPT 46358). Diagnoses: G31.84, R41.3, F06.8.

## 2024-11-08 NOTE — PLAN OF CARE
Goal Outcome Evaluation:      Plan of Care Reviewed With: patient, family    Neuro: A&OX4, Up with walker and SBA, forgetful.  Cardiac: Afib/aflutter 80s-90s, on HM3, #s WNL, dressing changed and system check successful, MAP=68-70-72, denies pain and nausea. INR goal of 2-3, warfarin given 2.5mg at 1800.  Respiratory: On RA, LS clear, denies DIOP.  GI/: Patient is on regular diet, had BM today, has no teeth, voiding via urinal spontaneously.  Plan: Will continue to monitor and notify MD of status changes.  He has Neuropsych tomorrow at 8am.

## 2024-11-08 NOTE — PROGRESS NOTES
Care Management Follow Up    Length of Stay (days): 16    Expected Discharge Date: 11/09/2024     Concerns to be Addressed: basic needs     Patient plan of care discussed at interdisciplinary rounds: Yes    Anticipated Discharge Disposition: Home, Home Care              Anticipated Discharge Services:    Anticipated Discharge DME: None    Patient/family educated on Medicare website which has current facility and service quality ratings: no  Education Provided on the Discharge Plan: Yes  Patient/Family in Agreement with the Plan: yes    Referrals Placed by CM/SW: Internal Clinic Care Coordination, Homecare  Private pay costs discussed: Not applicable    Discussed  Partnership in Safe Discharge Planning  document with patient/family: Yes     Handoff Completed: Yes, [facility] PCP: Internal handoff referral completed    Additional Information:  Patient plan for discharge 11/9/24, HC set up with [facility] for HC PT/OT/RN, and RNCC has updated hospital liaison of this plan, pt put on weekend list for coordinating.    Patient to discharge home with 4WW which has been delivered by [facility], who needs delivery paperwork signed and returned via fax, at writer's station above desk.    Patient with INR appointment for [facility] next Tuesday.    Next Steps: Update [facility] HC day of discharge, get [facility] paperwork signed and returned via fax day of discharge.     Discharge resources:     [facility]  Phone: [phone]  Fax: [phone]  HC RN/PT/OT update on discharge when ready      [facility]  [address]   [address]  Phone: [phone]  Fax: [phone]     Vendor to supply--     4 Wheeled Walker (already delivered)  Fax back signed delivery ticket sheet on day of discharge (at 6C RNCC workroom, above writer's desk).     INR: [facility]     Jaylon Aguila, SANDIEN, BA, RN, CMSRN, RNCC  [facility]  Covering Units 6C Beds  0060-4611/OBS  Phone: [phone]  Available on Vocera search 6C 3692-14 RNCC or OBS RNCC  After Hours [phone]     6C Beds [id number]  Ph: [phone]     6B/CRNCC Weekend/holiday on Vocera or [phone]     [facility] ED/5 Ortho/5 Med/Surg [phone]     [facility] RN[facility] 6 Med/Surg 8A, [phone]     Observation SW and weekend/after hours phone: [phone]

## 2024-11-08 NOTE — PROGRESS NOTES
I did another session of VAD Education review with Duane today. Duane also saw neuropsych today--there is a note in the chart.    After working with Duane for for 5 sessions this week, he did greatly improve on his recall of the VAD education materials. He is able to answer most questions Including what to do with emergency and advisory alarms, listing signs of infection, how to avoid static electricity in his environment, activities to avoid while on sternal precautions, when to call for VAD parameters that are outside of the normal range, what diet to eat (now it is liberalized to allow more salt), who to call for various VAD questions, Warfarin raises the INR, signs of a stroke and GI bleed, medication tips, etc.    Duane is now checked off on his VAD education. From a VAD standpoint, he is ready to discharge from the hospital to home with his wife Melissa acting as his 24/7 30 day caregiver.

## 2024-11-08 NOTE — PLAN OF CARE
1900-2330    Diagnosis: LVAD HM3, hypotension     Neuro: A&O x4, forgetful. Able to make needs known, call appropriately.   Cardiac: Afib/aflutter.  Denied chest pain, dizziness, palpitations. LVAD numbers WNL. MAP 80.   Respiratory: RA, sating >95%.   GI/: WDL.   Diet/Appetite: Regular diet, okay appetite. Encourage pt to drink 1 cup of chicken broth.   Skin: LVAD dressing CDI. No new deficits noted.   LDA: Left PIV SL.   Activity: SBA.   Pain: Denies pain  Plan: Neuro psych appointment tomorrow at 8am. Continue with POC. Notify CARDS 2 of pertinent changes.

## 2024-11-08 NOTE — PROGRESS NOTES
NAME  Johnson, Duane    MRN  [id number]      [birth year]     AGE  74     SEX  Female     HANDEDNESS Right     EDUCATION 12     DIOP  24     PROVIDER  [facility]  KB     STATION  6C            ORIENTATION      Time  -1     Personal Info. 3 /4    Place      Presidents  3.           WAIS-IV       FSIQ:  WMI:     VCI:  PSI:     ALICJA:  RDS: 8             Raw ScS    Similarities  25 11    Matrix Reasoning 15 12    Digit Span  24 10           MARTINE-O COMPLEX FIGURE       Raw T %ile   Copy  28.5  11-16   Time to Copy 326  >16                       WRAT    5     SS %ile GE   Reading  100 50 >12.9          COWAT FAS       Raw 28      ScS 7      T 41             ANIMAL FLUENCY      Raw 13      ScS 6      T 38              [place]STON NAMING TEST     Raw 15 /15     z 0.9             COMPLEX IDEATIONAL MATERIAL     Raw 9      ScS 5      T 27             TRAIL MAKING TEST       Time Errors ScS %ile   A 60 0 7 11-18   B DC @ 300 5            PHQ-9       Raw 3      Interp. Minimal             MAULIK-7       Raw 1      Interp. Minimal              HVLT   1     Raw T    Trial 1  6     Trial 2  8     Trial 3  5     Learning  2     Total Recall 19 41    Delayed Recall 0 <=20    Percent Retention 0% <=20    True Positives 5     False Positives 1     Disc. Index  4 <=20            BVMT   1     Raw T/%ile    Trial 1  2     Trial 2  3     Trial 3  4     Learning  2     Total Recall 9 31    Delayed Recall 0 30    Percent Retention 0% <1    Recognition Hits 4     Recognition F.P 0     Disc. Index  4 11-16           ACS WORD CHOICE      Raw 47             NAB JUDGEMENT      Raw 19      z 2.46      T 77

## 2024-11-09 VITALS
RESPIRATION RATE: 16 BRPM | HEIGHT: 67 IN | TEMPERATURE: 97.5 F | SYSTOLIC BLOOD PRESSURE: 94 MMHG | WEIGHT: 135.7 LBS | DIASTOLIC BLOOD PRESSURE: 54 MMHG | HEART RATE: 86 BPM | BODY MASS INDEX: 21.3 KG/M2 | OXYGEN SATURATION: 98 %

## 2024-11-09 LAB
ALBUMIN SERPL BCG-MCNC: 3.5 G/DL (ref 3.5–5.2)
ALP SERPL-CCNC: 104 U/L (ref 40–150)
ALT SERPL W P-5'-P-CCNC: 54 U/L (ref 0–70)
ANION GAP SERPL CALCULATED.3IONS-SCNC: 10 MMOL/L (ref 7–15)
AST SERPL W P-5'-P-CCNC: 48 U/L (ref 0–45)
BILIRUB SERPL-MCNC: 0.5 MG/DL
BUN SERPL-MCNC: 24.6 MG/DL (ref 8–23)
CALCIUM SERPL-MCNC: 8.8 MG/DL (ref 8.8–10.4)
CHLORIDE SERPL-SCNC: 93 MMOL/L (ref 98–107)
CREAT SERPL-MCNC: 0.63 MG/DL (ref 0.67–1.17)
EGFRCR SERPLBLD CKD-EPI 2021: >90 ML/MIN/1.73M2
ERYTHROCYTE [DISTWIDTH] IN BLOOD BY AUTOMATED COUNT: 15.4 % (ref 10–15)
GLUCOSE BLDC GLUCOMTR-MCNC: 111 MG/DL (ref 70–99)
GLUCOSE SERPL-MCNC: 92 MG/DL (ref 70–99)
HCO3 SERPL-SCNC: 24 MMOL/L (ref 22–29)
HCT VFR BLD AUTO: 31.9 % (ref 40–53)
HGB BLD-MCNC: 10.6 G/DL (ref 13.3–17.7)
INR PPP: 2.22 (ref 0.85–1.15)
LDH SERPL L TO P-CCNC: 222 U/L (ref 0–250)
MCH RBC QN AUTO: 30.3 PG (ref 26.5–33)
MCHC RBC AUTO-ENTMCNC: 33.2 G/DL (ref 31.5–36.5)
MCV RBC AUTO: 91 FL (ref 78–100)
PLATELET # BLD AUTO: 172 10E3/UL (ref 150–450)
POTASSIUM SERPL-SCNC: 4.2 MMOL/L (ref 3.4–5.3)
PROT SERPL-MCNC: 6.6 G/DL (ref 6.4–8.3)
RBC # BLD AUTO: 3.5 10E6/UL (ref 4.4–5.9)
SODIUM SERPL-SCNC: 127 MMOL/L (ref 135–145)
WBC # BLD AUTO: 4.3 10E3/UL (ref 4–11)

## 2024-11-09 PROCEDURE — 250N000013 HC RX MED GY IP 250 OP 250 PS 637: Performed by: NURSE PRACTITIONER

## 2024-11-09 PROCEDURE — 80053 COMPREHEN METABOLIC PANEL: CPT | Performed by: PHYSICIAN ASSISTANT

## 2024-11-09 PROCEDURE — 85027 COMPLETE CBC AUTOMATED: CPT | Performed by: NURSE PRACTITIONER

## 2024-11-09 PROCEDURE — 99239 HOSP IP/OBS DSCHRG MGMT >30: CPT | Mod: 24 | Performed by: STUDENT IN AN ORGANIZED HEALTH CARE EDUCATION/TRAINING PROGRAM

## 2024-11-09 PROCEDURE — 36415 COLL VENOUS BLD VENIPUNCTURE: CPT | Performed by: PHYSICIAN ASSISTANT

## 2024-11-09 PROCEDURE — 85610 PROTHROMBIN TIME: CPT | Performed by: NURSE PRACTITIONER

## 2024-11-09 PROCEDURE — 250N000013 HC RX MED GY IP 250 OP 250 PS 637: Performed by: PHYSICIAN ASSISTANT

## 2024-11-09 PROCEDURE — 83615 LACTATE (LD) (LDH) ENZYME: CPT | Performed by: PHYSICIAN ASSISTANT

## 2024-11-09 PROCEDURE — 93750 INTERROGATION VAD IN PERSON: CPT | Performed by: STUDENT IN AN ORGANIZED HEALTH CARE EDUCATION/TRAINING PROGRAM

## 2024-11-09 RX ORDER — AMIODARONE HYDROCHLORIDE 200 MG/1
200 TABLET ORAL DAILY
Qty: 90 TABLET | Refills: 3 | Status: SHIPPED | OUTPATIENT
Start: 2024-11-10

## 2024-11-09 RX ORDER — ATORVASTATIN CALCIUM 80 MG/1
80 TABLET, FILM COATED ORAL EVERY EVENING
Qty: 90 TABLET | Refills: 3 | Status: SHIPPED | OUTPATIENT
Start: 2024-11-09

## 2024-11-09 RX ORDER — LOSARTAN POTASSIUM 50 MG/1
50 TABLET ORAL 2 TIMES DAILY
Qty: 90 TABLET | Refills: 3 | Status: SHIPPED | OUTPATIENT
Start: 2024-11-09 | End: 2024-11-18

## 2024-11-09 RX ORDER — WARFARIN SODIUM 2.5 MG/1
2.5 TABLET ORAL EVERY EVENING
Qty: 90 TABLET | Refills: 3 | Status: ACTIVE | OUTPATIENT
Start: 2024-11-09

## 2024-11-09 RX ORDER — ESCITALOPRAM OXALATE 10 MG/1
10 TABLET ORAL DAILY
Qty: 90 TABLET | Refills: 3 | Status: SHIPPED | OUTPATIENT
Start: 2024-11-09

## 2024-11-09 RX ORDER — HYDRALAZINE HYDROCHLORIDE 25 MG/1
25 TABLET, FILM COATED ORAL 3 TIMES DAILY
Qty: 180 TABLET | Refills: 3 | Status: SHIPPED | OUTPATIENT
Start: 2024-11-09

## 2024-11-09 RX ORDER — MULTIPLE VITAMINS W/ MINERALS TAB 9MG-400MCG
1 TAB ORAL DAILY
Qty: 90 TABLET | Refills: 3 | Status: SHIPPED | OUTPATIENT
Start: 2024-11-09

## 2024-11-09 RX ORDER — GABAPENTIN 100 MG/1
100 CAPSULE ORAL AT BEDTIME
Qty: 90 CAPSULE | Refills: 3 | Status: SHIPPED | OUTPATIENT
Start: 2024-11-09

## 2024-11-09 RX ORDER — AMLODIPINE BESYLATE 10 MG/1
10 TABLET ORAL DAILY
Qty: 90 TABLET | Refills: 3 | Status: SHIPPED | OUTPATIENT
Start: 2024-11-10 | End: 2024-12-03

## 2024-11-09 RX ORDER — MAGNESIUM OXIDE 400 MG/1
400 TABLET ORAL DAILY
Qty: 90 TABLET | Refills: 3 | Status: SHIPPED | OUTPATIENT
Start: 2024-11-09

## 2024-11-09 RX ORDER — DIGOXIN 0.06 MG/1
62.5 TABLET ORAL DAILY
Qty: 90 TABLET | Refills: 3 | Status: SHIPPED | OUTPATIENT
Start: 2024-11-10 | End: 2024-11-09

## 2024-11-09 RX ORDER — DIGOXIN 0.06 MG/1
62.5 TABLET ORAL DAILY
Qty: 30 TABLET | Refills: 11 | Status: SHIPPED | OUTPATIENT
Start: 2024-11-09

## 2024-11-09 RX ADMIN — ESCITALOPRAM OXALATE 10 MG: 10 TABLET ORAL at 08:39

## 2024-11-09 RX ADMIN — HYDRALAZINE HYDROCHLORIDE 25 MG: 25 TABLET ORAL at 05:58

## 2024-11-09 RX ADMIN — LOSARTAN POTASSIUM 50 MG: 50 TABLET, FILM COATED ORAL at 08:38

## 2024-11-09 RX ADMIN — Medication 1 TABLET: at 08:38

## 2024-11-09 RX ADMIN — AMIODARONE HYDROCHLORIDE 200 MG: 200 TABLET ORAL at 08:39

## 2024-11-09 RX ADMIN — DIGOXIN 62.5 MCG: 0.06 TABLET ORAL at 08:38

## 2024-11-09 RX ADMIN — AMLODIPINE BESYLATE 10 MG: 10 TABLET ORAL at 08:38

## 2024-11-09 RX ADMIN — MAGNESIUM OXIDE TAB 400 MG (241.3 MG ELEMENTAL MG) 400 MG: 400 (241.3 MG) TAB at 08:38

## 2024-11-09 ASSESSMENT — ACTIVITIES OF DAILY LIVING (ADL)
ADLS_ACUITY_SCORE: 0

## 2024-11-09 NOTE — PLAN OF CARE
"D: Pt admitted 10/23/2024 from LVAD clinic with hypotension, dizziness, and new right arm weakness.    I: Monitored and assessed patient status; nursing cares provided.    A:   Today's Highlights/Changes:  Neuropsychology met with pt at bedside this am.  LVAD education completed at bedside with LVAD Coordinator, per coordinator, pt \"is now checked off on his VAD education\".  Continues with hyponatremia, Na 125 today. Pt is encouraged to drink non-free water liquids (ie juice, even soda, soups) and eat salty snacks, per Cards 2. Gatorade provided today.    Neuro: A&Ox4, no concerns of forgetfulness observed, uses call light appropriately.  Cardiac: A-fib/A-flutter, with frequent PVC's, HR 80's; HM3 implanted September 2024, LVAD numbers WDL. MAP this shift: 78-80 (goal MAP range is 65-85).   Respiratory: Lungs clear, on RA, denies shortness of breath  GI/: +BS, BM x1; voiding adequate amounts in urinal.  Diet/Appetite: Regular diet, fair oral intake   Skin: Driveline site, Bruised R & L forearms  Pain: Denies   Labs/Lytes: Na 125 (as mentioned above), K+ 4.7  LDA's: Right PIV SL'd  Activity: Up with SBA in hallways. Family visited with pt this afternoon.    P: Continue with current plan of care; potential discharge tomorrow. Contact Cards 2 for concerns or questions.    Allison Cosme RN  Cardiology  "

## 2024-11-09 NOTE — PROGRESS NOTES
Care Management Discharge Note    Discharge Date: 11/09/2024       Discharge Disposition: Home, Home Care    Discharge Services: Home Care    Discharge DME: Walker    Discharge Transportation: family or friend will provide    Private pay costs discussed: Not applicable    Does the patient's insurance plan have a 3 day qualifying hospital stay waiver?  No    PAS Confirmation Code: na  Patient/family educated on Medicare website which has current facility and service quality ratings: no    Education Provided on the Discharge Plan: Yes  Persons Notified of Discharge Plans: Patient, spouse  Patient/Family in Agreement with the Plan: yes    Handoff Referral Completed: Yes, [facility] PCP: Internal handoff referral completed    Additional Information:  Patient is medically ready to discharge today.  Patient and wife are in agreement with the plan.  His wife, Melissa, will be bringing him home.  [facility] signed and scanned in.  Paperwork for his walker has been signed and faxed to [employer].  [facility] called and notified of his discharge.    Discharge resources:     [facility]  Phone: [phone]  Fax: [phone]   RN/PT/OT       DME     [facility]  [address] [place] [address]   [id number]  Phone: [phone]  Fax: [phone]    Ana Tolentino,  Nurse Coordinator, BSN  Phone: [phone]  Vocera: 6B [facility] RN[facility]     Social Work and Care Management Department      SEARCHABLE in [facility] - search CARE COORDINATOR      [place] ([phone]) Saturday & Sunday; ([phone]) [facility] Recognized Holidays      Units: 5A Onc Vocera & 5C Vocera      Units: 6B Vocera & 6C Vocera       Units: 7A SOT RNCC Vocera, 7B Med Surg Vocera, & 7C Med Surg Vocera       Units: 6A Vocera & 4A CVICU Vocera, 4C MICU Vocera, and 4E SICU Vocera       Units: 5 Ortho Vocera & 5 Med Surg Vocera       Units: 6 Med Surg Vocera & 8 Med Surg Vocera       Chelsey Tolentino, RN

## 2024-11-09 NOTE — DISCHARGE SUMMARY
[facility]   Cardiology II Service / Advanced Heart Failure  Discharge Summary     Duane C Johnson MRN# [id number]   YOB: 1950 Age: 74 year old     DATE OF ADMISSION:  10/23/2024  DATE OF DISCHARGE: 11/9/2024  ADMITTING PROVIDER: Sravanthi Asencio MD  DISCHARGE PROVIDER: Allison Nicolas MD and Sirisha Arias DNP, FNP-C   PRIMARY PROVIDER:  Jose Coles    ADMIT DIAGNOSES:   1. Sustained hypotension, hypovolemia with LVAD low flow alarms/elevated PIs   2. Neurological symptoms in setting of hypotension, r/o CVA   3. Acute on chronic systolic heart failure 2/2 ICM  4. s/p HM3 LVAD 9/18/24  5. Hx VT/VF arrest   6. AF/AFL with previous DCCV    DISCHARGE DIAGNOSES:   1. Resolved hypotension, hypovolemia, low flow alarms  2. Acute on chronic systolic heart failure 2/2 ICM  3. s/p HM3 LVAD 9/18/24  4. Hx VT/VF arrest   5. AF/AFL with previous DCCV  6. Hypovolemia hyponatremia, improving   7. Cognitive changes c/w Alzheimer's disease or possibly mixed pathology, mild cognitive impairment     FOLLOW-UP:  [] Dr. Cruz 11/13 at 9AM, patient and wife report that they will be unable to make this appointment d/t wife's work schedule, requesting afternoon appointment. Discussed the importance of making all visits, regardless of time. Message sent to LVAD coordinators to see about possibility of moving appointment to later same day/rescheduling.   [] INR check 11/12 [facility]     HPI: Please see the detailed H & P by Dr. Asencio and Sirisha Arias NP-C from 10/23/2024. Briefly, Duane C Johnson is a 74 year old male pmhx of anterior STEMI s/p PCI to the pLAD on 10/26/23 with a VT/VF arrest the next day (10/27) with patent stents and unchanged anatomy on repeat angiogram. Placed on amiodarone and discharged 11/03/23, ICD was not indicated as this was within 48h of his MI. His EF prior to discharge was 20-30% with a large area of akinesis in the LAD, placed on apixaban due to this (Apixaban dcd on  7/5/24). Has had multiple admissions for HF exacerbation last year.  Admitted on 8/30/24 for CHF exacerbation with BNP 16k. CPX and RHC showed significant functional limitations due to heart failue. Underwent HM3 LVAD on 9/18/24.     Mr. Aguila was discharged to ARU on 10/5/24. There, he continued to have frequent low flow alarms with labile BPs and has required frequent adjustments to BP medications, initiation of florinef, as well as IVF and increased PO intake.     Seen in clinic on 10/23 where he was presyncopal with MAPs in 40s. He was sent from clinic to ED for further evaluation. Admitted 10/23/24 for further workup of hypotension, hypovolemia, and low flows.     HOSPITAL COURSE:   Admitted 10/23/24. Underwent stroke eval for neurological changes with negative CT. Symptoms improved following 2L IVF and improved MAPs. He was resumed on low doses of GDMT/afterload reduction. During this hospitalization continued to have several more runs of low flows. MAPs/meds optimized and prior to discharge he was without low flows/high PIs x 3 days. Controlled was also switched to low flow controlled. Discharge was delayed due to needing additional LVAD education. Consulted neuropsych due to concerns for cognitive changes. Neuropych findings consistent with Alzheimer's disease or possibly mixed pathology. At this point, based on his reported independence with instrumental daily activities, it is accurate use label mild cognitive impairment.       # Acute on chronic systolic heart failure secondary to ICM   # s/p HM3 LVAD as DT on 9/18/2024  # CAD s/p PCI  # History of STEMI  # s/p ICD 5/2024  # Acute angle of outflow fraft  Stage D. NYHA Class III.     10/23/24 RA 6, PA 40/14/20, PCW 9, Goldy CO/CI 4.54/2.59 at 5100 rpm     Fluid status: euvolemic   ACEi/ARB/ARNi: losartan 50 BID  Vasodilator: hydralazine 25 mg q8h.  amlodipine 10 mg daily  BB: deferred due to recent VAD  Aldosterone antagonist: STOPPED blane due to  hypoNa  SGLT2i: STOPPED empagliflozin 10 mg d/t propensity for hypovolemia  SCD prophylaxis: ICD  MAP: goal 70-90, encouraging POs ulitize hold parameters  LDH trends: stable 200s  Anticoagulation: warfarin dosing per pharmacy, INR goal 2-3, INR 2.4  Antiplatelet: ASA not indicated in LVAD population, > 6 months s/p STEMI  Other:   - No fluid restriction, encourage drinks with Na/electrolytes, salty snacks  - Patient has a low flow controller, will not alarm until flow <2. Hematocrit is set to match his true hct  - Dr. Berg has reviewed inflow and outflow positioning, nothing to do     # Low flow alarms, resolved  # Elevated PIs  # Labile MAPs   # Suspected orthostatic hypotension, resolved  Speed optimization echo performed 10/1, speed decreased to 5100. Low flows seems releated to hypertension and some hypovolemia as well. Have improved with re-timing hydralazine and after IV fluids. CT-A was done on  10/17/24 with no outflow graft ostruction. The outflow graft does make an acute angle as it approaches the aorta, discussed with Dr. Valentine, unlikely to be impacting the low flows at this time.  Having daily low flow alarms, always in the setting of htn. Mostly ~5 am, although has had some in the evening as well. They do not seem to be positional. RHC with reasonable filling pressures as above. Has been asymptomatic.  - Decreased speed to 5000 on 10/27  - Losartan 50 mg BID  - Hydralazine and amlodipine as above  - Stopped fludrocortisone 0.1 mg daily   - Increase PO intake as above, specifically right before bed, non free-water sources due to hypoNa  - Temp drop in hematocrit on monitor 10/29-10/30, low-flow controller change out 10/30, changed hematocrit back 10/31     # History of VT/VF arrest 2023  # AFib s/p DCCV 9/2024 and again 9/30/2024   Successfully cardioverted in early September for AF. Since then EKG suggested AF on 9/21. Tele is only available from as early as 9/22. The patient was started on hep  "gtt 9/21, but unclear if was in AF before this. While the patient was on hep gtt until INR was therapeutic, it is not possible to assess rhythm prior to 9/21. Systemic AC was off on 9/18-9/20. S/p MELBA and DCCV on 9/30 with conversion to sinus rhythm.  10/23 device interrogation shows persistent episode of AF since 10/10 with rates   - Continue amiodarone 200 mg daily  - S/p digoxin load, continue digoxin 62.5 mcg daily   - Ongoing a fib- given ongoing low flows- deferring cardioversion for now given reasonable rate control.   - AC as above     # Hypovolemia hyponatremia, stable  Na 127 at discharge   - encourage high sodium PO intake/gatorade and salty foods     # Visual changes, resolved.   # Generalized weakness, improved   # Headache, resolved  Stoke code called 9/29 for visual changes - right eye with decreased upper half of vision and bluish haze. Resolved after 30 minutes. Seen by opthalmology and neuro. Negative head CT and CTA head and neck. He has had 3 episodes which last about 5 minutes before resolving completely.      On 10/9 Stroke code activated due to concern of generalized weakness, numbness and headache. LKW reported as 0715 when he worked with therapies and shortly after started feeling generally weak. Then around 1500 he had onset of moderate \"tension\" type headache and tingling in the bilateral fingertips. Per RRT URIAH patient now reports tingling has resolved and headache is improving. MAP 48, INR 2.2. Given absence of focal deficits and rapidly improving symptoms, opted to cancel stroke.  CT head negative, though did show chronic maxillary sinusitis.      Stroke code called 10/23 when seen in clinic with report of new RUE weakness. He was notably hypotensive at this time with MAP ~45 with new right pronator drift. Evaluated in ED, CT and CTA head and neck negative. RUE weakness resolved.   - monitor for changes- no current symptoms     # Blood culture postivie for staph epi. 1/2 bottles. " "No fevers. No symptoms.  - received one dose IV vanco, stopped given likely contaminant  - repeat blood culture pending 10/25 negative     # Cognitive changes  - Formal neuropsych testing this admission reveiling some cognitive changes  - 24/7 care during his first 30 days, will likely not need therafter, but continue to assess with each LVAD appointment (per Ho Méndez note from 11/8 Duane has passed his VAD education)  - Needs formal driving assessment with DMV or OT prior to being cleared to drive, wife aware and also discussed with patient at time of discharge  - Repeat neuropsych testing in 1 year  - Referred to behavioral neurology on discharge      Sirisha Arias DNP, FNP-C  Advanced Heart Failure/Cardiology 2 Nurse Practitioner  11/9/2024  Pager: [phone]    PHYSICAL EXAM:  Blood pressure 94/54, pulse 86, temperature 97.5  F (36.4  C), temperature source Oral, resp. rate 16, height 1.702 m (5' 7\"), weight 61.6 kg (135 lb 11.2 oz), SpO2 98%.    GENERAL: Appears comfortable, in no distress.  HEENT: Eye symmetrical and without discharge or icterus bilaterally. Mucous membranes moist and without lesions.  NECK: Supple, JVD <6cm.   CV: + LVAD hum.   RESPIRATORY: Respirations regular, even, and unlabored. Lungs CTA throughout.   GI: Soft and non distended with normoactive bowel sounds present in all quadrants. No tenderness, rebound, guarding.   EXTREMITIES: no peripheral edema. 2+ bilateral pedal pulses.   NEUROLOGIC: Alert and orientated x 3. No focal deficits.   MUSCULOSKELETAL: No joint swelling or tenderness.   SKIN: No jaundice. No rashes or lesions.     The patient's HeartMate LVAD was interrogated 11/9/2024  * Speed 5000 rpm   * Pulsatility index 3.2   * Power 3.2 Dahl   * Flow 3.7 L/minute   Fluid status: euvolemic   Alarms were reviewed, and notable for frequent PI events, no power spikes, speed drops, or other findings suspicious of pump malfunction noted. Hx back to 11/7.     The driveline " exit site was inspected, CDI.   All external components were inspected and showed no evidence of damage or malfunction, none replaced.   No changes to VAD settings made      LABS:   Last CBC:   Recent Labs   Lab Test 11/09/24  0540   WBC 4.3   RBC 3.50*   HGB 10.6*   HCT 31.9*   MCV 91   MCH 30.3   MCHC 33.2   RDW 15.4*          Last CMP:  Recent Labs   Lab Test 11/09/24  0540   *   POTASSIUM 4.2   CHLORIDE 93*   PRANAV 8.8   CO2 24   BUN 24.6*   CR 0.63*   GLC 92   AST 48*   ALT 54   BILITOTAL 0.5   ALBUMIN 3.5   PROTTOTAL 6.6   ALKPHOS 104       IMAGING:  Results for orders placed or performed during the hospital encounter of 10/23/24   CT Head w/o Contrast    Narrative    CT HEAD W/O CONTRAST 10/23/2024 9:46 AM    History: Stroke.     Comparison: 10/9/2024     Technique: Using multidetector thin collimation helical acquisition  technique, axial, coronal and sagittal CT images from the skull base  to the vertex were obtained without intravenous contrast.     Findings: There is no intracranial hemorrhage, mass effect or midline  shift. There is no focal loss of gray-white matter differentiation,  insular ribbon sign, or focally hyperdense artery to suggest acute  infarction. The ventricles are proportionate to the cerebral sulci.  Mild generalized parenchymal atrophy. Mild patchy hypoattenuation of  the periventricular white matter which is nonspecific but favored to  represent chronic small vessel ischemic disease given patient's age.  Unchanged slightly asymmetric right frontal extra-axial space compared  to the left. Subtle extra-axial hyperdensity on series 4 image 22 is  favored artifactual.    The bony calvaria and the bones of the skull base appear normal.  Chronic opacification of the left maxillary sinus. The mastoid air  cells are clear.       Impression    Impression:   1. No acute intracranial pathology.  2. ASPECT Score: 10/10.    [Stroke Code Result: Negative]    Finding was identified on  10/23/2024 9:56 AM.     Dr. Juan Diego Shin was contacted by Dr. Geovanny Mayes on 10/23/2024 9:57  AM and verbalized understanding of the result.    [facility] Stroke Communication Guidelines:  [place] ED: [phone] (EB ED)  [facility] Inpatient: [phone] ext. [phone] (Stroke Ascom)  [facility] ED or Inpatient: [phone] (WB ED)     I have personally reviewed the examination and initial interpretation  and I agree with the findings.    SJ SAUCEDA MD         SYSTEM ID:  [id number]   CTA Head Neck with Contrast    Narrative    EXAM: CTA HEAD NECK W CONTRAST  10/23/2024 9:46 AM     HISTORY:  stroke code, right arm weakness       COMPARISON:  Head CT 10/9/2024, CTA 9/29/2024    TECHNIQUE:    HEAD and NECK CTA: During rapid bolus intravenous injection of  nonionic contrast material, axial images were obtained using thin  collimation multidetector helical technique from the base of the upper  aortic arch through the [place]. This CT angiogram data was  reconstructed at thin intervals with mild overlap. Images were sent to  the 3D workstation, and 3D reconstructions were obtained. The axial  source images, multiplanar reformations, 3D reconstructions in both  maximum intensity projection display and volume rendered models were  reviewed, with reconstructions performed by the technologist.    CONTRAST: 67 mL [id number]    FINDINGS:  Head CTA demonstrates no intracranial arterial aneurysm or stenosis.    Neck CTA demonstrates conventional aortic arch branching pattern and  patent great vessel origins. The normal distal right internal carotid  artery measures 5 mm. The normal distal left internal carotid artery  measures 5 mm. No vertebral artery stenosis. Mild atherosclerotic  calcification of the left carotid bulb.    No acute finding in the visualized neck soft tissues. Left greater  than right pleural effusions and diffuse interlobular septal  thickening.      Impression    IMPRESSION:    1. Head CTA demonstrates  no aneurysm or stenosis of the major  intracranial arteries.   2. Neck CTA demonstrates no stenosis of the major cervical arteries.    I have personally reviewed the examination and initial interpretation  and I agree with the findings.    SJ SAUCEDA MD         SYSTEM ID:  [id number]   CT Head w/o Contrast    Narrative    EXAM: CT HEAD W/O CONTRAST  10/24/2024 10:00 AM     HISTORY:  follow up stroke code       COMPARISON:  CT head 10/23/2024    TECHNIQUE: Using multidetector thin collimation helical acquisition  technique, axial, coronal and sagittal CT images from the skull base  to the vertex were obtained without intravenous contrast.   (topogram) image(s) also obtained and reviewed.    FINDINGS:  No acute intracranial hemorrhage, mass effect, or midline shift.  Previously seen hyperdensity along the right cerebral convexity is not  visualized on today's exam, favoring artifact on prior exam. No acute  loss of gray-white matter differentiation in the cerebral hemispheres.  Mild periventricular white matter hypodensities, likely secondary to  chronic small vessel ischemic disease. Ventricles are proportionate to  the cerebral sulci. Clear basal cisterns.    The bony calvaria and the bones of the skull base are normal. Diffuse  thickening of the left maxillary sinus with hypertrophy of the  surrounding bone. Grossly normal orbits.       Impression    IMPRESSION:  1. No acute intracranial pathology.   2. Mucosal thickening of the left maxillary sinus with hypertrophy of  the surrounding bone, this can be seen in chronic sinusitis.    I have personally reviewed the examination and initial interpretation  and I agree with the findings.    MEHNAZ PINEDA MD         SYSTEM ID:  [id number]   Echocardiogram Complete - For age > 60 yrs     Value    LVEF  <20%    Narrative    [id number]  [facility]  [id number]  [id number]^DENISE^CLOTILDE     [facility],[place]  Echocardiography Laboratory  [address]  [place] [address]     Name: JOHNSON, DUANE C  MRN: [id number]  : 1950  Study Date: 10/23/2024 10:03 AM  Age: 74 yrs  Gender: Male  Patient Location: [facility]  Reason For Study: Cerebrovascular Incident  Ordering Physician: CLOTILDE WALKER  Performed By: Luther Tidwell     BSA: 1.7 m2  Height: 66 in  Weight: 136 lb  ______________________________________________________________________________  Procedure  LVAD Echocardiogram with two-dimensional, color and spectral Doppler  performed.  ______________________________________________________________________________  Interpretation Summary  LVAD cannula was seen in the expected anatomic position in the LV apex.  HM3 at 5100RPM.  LVIDd 47mm.  Septum normal.  Aortic valve open intermittently. No AI.  Normal flow velocities.  Global right ventricular function is mildly to moderately reduced.  Small circumferential pericardial effusion is present without any hemodynamic  significance.  ______________________________________________________________________________  Left Ventricle  The visual ejection fraction is <20%. LVAD cannula was seen in the expected  anatomic position in the LV apex. HM3 at 5100RPM.  LVIDd 47mm.  Septum normal.  Aortic valve open intermittently. No AI.  Normal flow velocities.     Right Ventricle  Global right ventricular function is mildly to moderately reduced. A pacemaker  lead is noted in the right ventricle.     Vessels  IVC diameter >2.1 cm collapsing <50% with sniff suggests a high RA pressure  estimated at 15 mmHg or greater.     Pericardium  Small circumferential pericardial effusion is present without any hemodynamic  significance.  ______________________________________________________________________________  MMode/2D Measurements & Calculations     IVSd: 1.3 cm  LVIDd: 4.7 cm  LVIDs: 4.0 cm  LVPWd: 1.3 cm  FS: 14.8 %  LV mass(C)d: 230.6 grams  LV mass(C)dI: 135.8 grams/m2  RWT: 0.55     Doppler Measurements &  Calculations  TR max francisco j: 226.4 cm/sec  TR max P.5 mmHg     ______________________________________________________________________________  Report approved by: Eusebio Terry 10/23/2024 11:58 AM         Cardiac Catheterization    Narrative      Right sided filling pressures are normal.    Left sided filling pressures are normal.    Normal PA pressures.    Normal cardiac output level.    Hemodynamic data has been modified in Epic per physician review.       CONSULTATIONS:   [] Neurology  [] Neuropsychology   [] Social Work    DISCHARGE MEDICATIONS:  Current Discharge Medication List        START taking these medications    Details   amLODIPine (NORVASC) 10 MG tablet Take 1 tablet (10 mg) by mouth daily.  Qty: 90 tablet, Refills: 3    Associated Diagnoses: Transient hypotension; LVAD (left ventricular assist device) present (H); Other specified hypotension; Anticoagulated on warfarin      digoxin (LANOXIN) 62.5 MCG tablet Take 1 tablet (62.5 mcg) by mouth daily.  Qty: 30 tablet, Refills: 11    Associated Diagnoses: Atrial fibrillation, unspecified type (H)           CONTINUE these medications which have CHANGED    Details   amiodarone (PACERONE) 200 MG tablet Take 1 tablet (200 mg) by mouth daily.  Qty: 90 tablet, Refills: 3    Associated Diagnoses: Transient hypotension; LVAD (left ventricular assist device) present (H); Other specified hypotension; Anticoagulated on warfarin      atorvastatin (LIPITOR) 80 MG tablet Take 1 tablet (80 mg) by mouth every evening.  Qty: 90 tablet, Refills: 3    Associated Diagnoses: Transient hypotension; LVAD (left ventricular assist device) present (H); Other specified hypotension; Anticoagulated on warfarin      escitalopram (LEXAPRO) 10 MG tablet Take 1 tablet (10 mg) by mouth or Feeding Tube daily.  Qty: 90 tablet, Refills: 3    Associated Diagnoses: LVAD (left ventricular assist device) present (H)      gabapentin (NEURONTIN) 100 MG capsule Take 1 capsule (100 mg) by mouth  or Feeding Tube at bedtime.  Qty: 90 capsule, Refills: 3    Associated Diagnoses: LVAD (left ventricular assist device) present (H)      hydrALAZINE (APRESOLINE) 25 MG tablet Take 1 tablet (25 mg) by mouth 3 times daily.  Qty: 180 tablet, Refills: 3    Associated Diagnoses: Transient hypotension; LVAD (left ventricular assist device) present (H); Other specified hypotension; Anticoagulated on warfarin      losartan (COZAAR) 50 MG tablet Take 1 tablet (50 mg) by mouth 2 times daily.  Qty: 90 tablet, Refills: 3    Associated Diagnoses: LVAD (left ventricular assist device) present (H)      magnesium hydroxide (MILK OF MAGNESIA) 400 MG/5ML suspension Take 30 mLs by mouth daily as needed for constipation (Use if polyethylene glycol (Miralax) is not effective after 24 hours.).  Qty: 354 mL, Refills: 0    Associated Diagnoses: Transient hypotension; LVAD (left ventricular assist device) present (H); Other specified hypotension; Anticoagulated on warfarin      magnesium oxide (MAG-OX) 400 MG tablet Take 1 tablet (400 mg) by mouth daily.  Qty: 90 tablet, Refills: 3    Associated Diagnoses: Hypomagnesemia      multivitamin w/minerals (THERA-VIT-M) tablet Take 1 tablet by mouth daily.  Qty: 90 tablet, Refills: 3    Associated Diagnoses: History of left ventricular assist device (LVAD) (H)      warfarin ANTICOAGULANT (COUMADIN) 2.5 MG tablet Take 1 tablet (2.5 mg) by mouth every evening.  Qty: 90 tablet, Refills: 3    Associated Diagnoses: Anticoagulated on warfarin           CONTINUE these medications which have NOT CHANGED    Details   acetaminophen (TYLENOL) 325 MG tablet Take 2 tablets (650 mg) by mouth every 4 hours as needed for other (For optimal non-opioid multimodal pain management to improve pain control.).    Associated Diagnoses: LVAD (left ventricular assist device) present (H)      melatonin 10 MG TABS tablet Take 1 tablet (10 mg) by mouth every evening.    Associated Diagnoses: LVAD (left ventricular assist  device) present (H)           STOP taking these medications       empagliflozin (JARDIANCE) 10 MG TABS tablet Comments:   Reason for Stopping:         FISH OIL-VITAMIN D PO Comments:   Reason for Stopping:         fludrocortisone (FLORINEF) 0.1 MG tablet Comments:   Reason for Stopping:         methocarbamol (ROBAXIN) 750 MG tablet Comments:   Reason for Stopping:         pantoprazole (PROTONIX) 40 MG EC tablet Comments:   Reason for Stopping:               DISCHARGE DISPOSITION: Duane C Johnson will discharge to home in stable condition.     DISCHARGE INSTRUCTIONS:  Discharge Procedure Orders   Home Care Referral   Referral Priority: Routine: Next available opening Referral Type: Home Health Therapies & Aides   Number of Visits Requested: 1     ANTICOAGULATION CLINIC REFERRAL   Referral Priority: Routine   Referred to Provider: BRENT PHILLIPS   Number of Visits Requested: 1     Primary Care - Care Coordination Referral   Referral Priority: Routine: Next available opening Referral Type: Care Coordination   Number of Visits Requested: 1     Cardiac Rehab  Referral   Standing Status: Future   Referral Priority: Routine Referral Type: Rehab Therapy Cardiac Therapy   Number of Visits Requested: 1     Adult Neurology  Referral   Referral Priority: Routine: Next available opening Referral Type: Consultation   Number of Visits Requested: 1     Reason for your hospital stay   Order Comments: You were admitted from ARU/clinic visit on 10/5/24 due to frequent low flow alarms on your LVAD with hypotension, lightheadedness, dehydration, and ongoing need for frequent medication adjustments.     Activity   Order Comments: Your activity upon discharge: activity as tolerated    NO DRIVING UNTIL YOU COMPLETE A BEHIND THE WHEEL EVALUATION WITH THE DMV.     Order Specific Question Answer Comments   Is discharge order? Yes      Adult [facility] Follow-up and recommended labs and tests   Order Comments: Follow up with   Anthony on 11/13/2024 at 9AM at [place] [phone]     We understand that you have scheduling conflicts with your appointment on 11/13. We are working to reschedule this appointment and your LVAD coordinator will be reaching out to you to discuss on Monday 11/11.     Appointments on [place] and/or [facility] (with [facility] or [facility] provider or service). Call [phone] if you haven't heard regarding these appointments within 7 days of discharge.     Diet   Order Comments: Follow this diet upon discharge: Current Diet:Orders Placed This Encounter      Snacks/Supplements Adult: Other; cottage cheese; With Meals      Snacks/Supplements Adult: Ensure Enlive; With Meals      Regular Diet Adult    Recommend aiming for 2.5-3 L of fluid daily. Drink 1-2 Gatorades daily and 1-2 Ensures daily.     Order Specific Question Answer Comments   Is discharge order? Yes        65 minutes spent in discharge, including >50% in counseling and coordination of care, medication review and plan of care recommended on follow up. Questions were answered, patient agrees to plan.

## 2024-11-09 NOTE — PLAN OF CARE
Occupational Therapy Discharge Summary    Reason for therapy discharge:    Discharged to home with home therapy.    Progress towards therapy goal(s). See goals on Care Plan in [facility] electronic health record for goal details.  Goals partially met.  Barriers to achieving goals:   discharge from facility.    Therapy recommendation(s):    Continued therapy is recommended.  Rationale/Recommendations:  home OT to increase ADL independence.

## 2024-11-09 NOTE — PLAN OF CARE
D: pt presents from LVAD clinic with hypotension, dizziness, and new right arm weakness.        I: Monitored vitals and assessed pt status.   Changes during this shift:   Pt encouraged to drink non-free water liquids eg. Gatorade     Running:   PRN Med:     Neuro: A&Ox4. Intermittent forgetfulness  Cardiac: Afib/Aflutter with PVCs. LVAD HM3 numbers WDL. MAP within goal range 65-85.  Respiratory: on RA.  GI/: Adequate urine output. BM X1  Diet/appetite: regular diet. Fair oral intake  Activity:  Up w SBA  Pain: At acceptable level on current regimen.   Skin: No new deficits noted. LVAD Driveline site.   LDA's: R PIV    Plan: Continue with POC. Notify primary team with changes.     Goal Outcome Evaluation:    Problem: Ventricular Assist Device  Goal: Optimal Adjustment to Device  Outcome: Progressing  Goal: Absence of Bleeding  Outcome: Progressing  Intervention: Monitor and Manage Bleeding  Recent Flowsheet Documentation  Taken 11/8/2024 2336 by Ric Mederos RN  Bleeding Precautions: monitored for signs of bleeding  Bleeding Management: dressing monitored  Taken 11/8/2024 2100 by Ric Mederos RN  Bleeding Precautions: monitored for signs of bleeding  Bleeding Management: dressing monitored  Goal: Optimal Device Function  Outcome: Progressing  Goal: Absence of Embolism Signs and Symptoms  Outcome: Progressing  Intervention: Prevent or Manage Embolism  Recent Flowsheet Documentation  Taken 11/8/2024 2100 by Ric Mederos RN  VTE Prevention/Management:   SCDs off (sequential compression devices)   patient refused intervention  Goal: Optimal Functional Ability  Outcome: Progressing  Intervention: Optimize Functional Ability  Recent Flowsheet Documentation  Taken 11/8/2024 2336 by Ric Mederos RN  Activity Management: activity adjusted per tolerance  Taken 11/8/2024 2100 by Ric Mederos RN  Self-Care Promotion: independence encouraged  Activity Management: activity adjusted per tolerance  Goal: Optimal  Blood Flow  Outcome: Progressing  Goal: Absence of Infection Signs and Symptoms  Outcome: Progressing  Intervention: Prevent or Manage Infection  Recent Flowsheet Documentation  Taken 11/8/2024 2100 by Ric Mederos, RN  Driveline Securement: (anchor) other (see comments)

## 2024-11-09 NOTE — PROGRESS NOTES
The patient's HeartMate LVAD was interrogated 11/8/2024  * Speed 5000 rpm   * Pulsatility index 4.2   * Power 3.2 Dahl   * Flow 3.5 L/minute   Fluid status: euvolemic   Alarms were reviewed, and notable for no low flow alarms, frequent pi events but less than previous days with less associated speed drops.   The driveline exit site was inspected, c/d/i.   All external components were inspected and showed no evidence of damage or malfunction, none replaced.   No changes to VAD settings made

## 2024-11-09 NOTE — PLAN OF CARE
Physical Therapy Discharge Summary    Reason for therapy discharge:    Discharged to home with home therapy.    Progress towards therapy goal(s). See goals on Care Plan in [facility] electronic health record for goal details.  Goals partially met.  Barriers to achieving goals:   discharge from facility.    Therapy recommendation(s):    Continued therapy is recommended.  Rationale/Recommendations:  increase safety and independence with functional mobility.

## 2024-11-10 ENCOUNTER — HOSPITAL ENCOUNTER (EMERGENCY)
Facility: CLINIC | Age: 74
Discharge: HOME | End: 2024-11-10
Attending: STUDENT IN AN ORGANIZED HEALTH CARE EDUCATION/TRAINING PROGRAM | Admitting: STUDENT IN AN ORGANIZED HEALTH CARE EDUCATION/TRAINING PROGRAM
Payer: MEDICARE

## 2024-11-10 ENCOUNTER — TELEPHONE (OUTPATIENT)
Dept: CARDIOLOGY | Facility: CLINIC | Age: 74
End: 2024-11-10

## 2024-11-10 ENCOUNTER — TELEPHONE (OUTPATIENT)
Dept: CARDIOLOGY | Facility: CLINIC | Age: 74
End: 2024-11-10
Payer: MEDICARE

## 2024-11-10 VITALS
BODY MASS INDEX: 21.14 KG/M2 | SYSTOLIC BLOOD PRESSURE: 102 MMHG | TEMPERATURE: 98.1 F | WEIGHT: 135 LBS | RESPIRATION RATE: 12 BRPM | DIASTOLIC BLOOD PRESSURE: 83 MMHG | HEART RATE: 78 BPM | OXYGEN SATURATION: 92 %

## 2024-11-10 DIAGNOSIS — R55 NEAR SYNCOPE: ICD-10-CM

## 2024-11-10 LAB
ALBUMIN SERPL BCG-MCNC: 3.4 G/DL (ref 3.5–5.2)
ALP SERPL-CCNC: 116 U/L (ref 40–150)
ALT SERPL W P-5'-P-CCNC: 61 U/L (ref 0–70)
ANION GAP SERPL CALCULATED.3IONS-SCNC: 7 MMOL/L (ref 7–15)
AST SERPL W P-5'-P-CCNC: 51 U/L (ref 0–45)
BASOPHILS # BLD AUTO: 0.1 10E3/UL (ref 0–0.2)
BASOPHILS NFR BLD AUTO: 1 %
BILIRUB SERPL-MCNC: 0.4 MG/DL
BUN SERPL-MCNC: 28.3 MG/DL (ref 8–23)
CALCIUM SERPL-MCNC: 8.9 MG/DL (ref 8.8–10.4)
CHLORIDE SERPL-SCNC: 93 MMOL/L (ref 98–107)
CREAT SERPL-MCNC: 0.73 MG/DL (ref 0.67–1.17)
EGFRCR SERPLBLD CKD-EPI 2021: >90 ML/MIN/1.73M2
EOSINOPHIL # BLD AUTO: 0.1 10E3/UL (ref 0–0.7)
EOSINOPHIL NFR BLD AUTO: 1 %
ERYTHROCYTE [DISTWIDTH] IN BLOOD BY AUTOMATED COUNT: 15 % (ref 10–15)
GLUCOSE SERPL-MCNC: 91 MG/DL (ref 70–99)
HCO3 SERPL-SCNC: 27 MMOL/L (ref 22–29)
HCT VFR BLD AUTO: 31.1 % (ref 40–53)
HGB BLD-MCNC: 10.5 G/DL (ref 13.3–17.7)
IMM GRANULOCYTES # BLD: 0 10E3/UL
IMM GRANULOCYTES NFR BLD: 0 %
LYMPHOCYTES # BLD AUTO: 0.4 10E3/UL (ref 0.8–5.3)
LYMPHOCYTES NFR BLD AUTO: 7 %
MCH RBC QN AUTO: 31.3 PG (ref 26.5–33)
MCHC RBC AUTO-ENTMCNC: 33.8 G/DL (ref 31.5–36.5)
MCV RBC AUTO: 93 FL (ref 78–100)
MONOCYTES # BLD AUTO: 0.6 10E3/UL (ref 0–1.3)
MONOCYTES NFR BLD AUTO: 11 %
NEUTROPHILS # BLD AUTO: 4.4 10E3/UL (ref 1.6–8.3)
NEUTROPHILS NFR BLD AUTO: 79 %
NRBC # BLD AUTO: 0 10E3/UL
NRBC BLD AUTO-RTO: 0 /100
PLATELET # BLD AUTO: 150 10E3/UL (ref 150–450)
POTASSIUM SERPL-SCNC: 4.5 MMOL/L (ref 3.4–5.3)
PROT SERPL-MCNC: 6.8 G/DL (ref 6.4–8.3)
RBC # BLD AUTO: 3.36 10E6/UL (ref 4.4–5.9)
SODIUM SERPL-SCNC: 127 MMOL/L (ref 135–145)
WBC # BLD AUTO: 5.5 10E3/UL (ref 4–11)

## 2024-11-10 PROCEDURE — 96361 HYDRATE IV INFUSION ADD-ON: CPT | Performed by: STUDENT IN AN ORGANIZED HEALTH CARE EDUCATION/TRAINING PROGRAM

## 2024-11-10 PROCEDURE — 85025 COMPLETE CBC W/AUTO DIFF WBC: CPT | Performed by: STUDENT IN AN ORGANIZED HEALTH CARE EDUCATION/TRAINING PROGRAM

## 2024-11-10 PROCEDURE — 258N000003 HC RX IP 258 OP 636: Performed by: EMERGENCY MEDICINE

## 2024-11-10 PROCEDURE — 258N000003 HC RX IP 258 OP 636: Performed by: STUDENT IN AN ORGANIZED HEALTH CARE EDUCATION/TRAINING PROGRAM

## 2024-11-10 PROCEDURE — 99283 EMERGENCY DEPT VISIT LOW MDM: CPT | Performed by: STUDENT IN AN ORGANIZED HEALTH CARE EDUCATION/TRAINING PROGRAM

## 2024-11-10 PROCEDURE — 85025 COMPLETE CBC W/AUTO DIFF WBC: CPT | Performed by: EMERGENCY MEDICINE

## 2024-11-10 PROCEDURE — 80053 COMPREHEN METABOLIC PANEL: CPT | Performed by: STUDENT IN AN ORGANIZED HEALTH CARE EDUCATION/TRAINING PROGRAM

## 2024-11-10 PROCEDURE — 99283 EMERGENCY DEPT VISIT LOW MDM: CPT | Mod: 25 | Performed by: STUDENT IN AN ORGANIZED HEALTH CARE EDUCATION/TRAINING PROGRAM

## 2024-11-10 PROCEDURE — 36415 COLL VENOUS BLD VENIPUNCTURE: CPT | Performed by: EMERGENCY MEDICINE

## 2024-11-10 PROCEDURE — 80053 COMPREHEN METABOLIC PANEL: CPT | Performed by: EMERGENCY MEDICINE

## 2024-11-10 PROCEDURE — 96360 HYDRATION IV INFUSION INIT: CPT | Performed by: STUDENT IN AN ORGANIZED HEALTH CARE EDUCATION/TRAINING PROGRAM

## 2024-11-10 RX ADMIN — SODIUM CHLORIDE 1000 ML: 9 INJECTION, SOLUTION INTRAVENOUS at 13:21

## 2024-11-10 RX ADMIN — SODIUM CHLORIDE 500 ML: 9 INJECTION, SOLUTION INTRAVENOUS at 11:35

## 2024-11-10 ASSESSMENT — ACTIVITIES OF DAILY LIVING (ADL)
ADLS_ACUITY_SCORE: 0

## 2024-11-10 NOTE — ED NOTES
Pt has eaten a full turkey sandwich, fruit cup, applesause, cheese stick and 3 apple juices and 1 cup of water.  Pt has voided 500ml yellow urine.  Pt up and ambulatroy around unit with nurse and offers no complaints.  Pt stable on his feet, and LVAD number look good.  See flowsheet for exact details.  Pt dressed and ready for discharge.  Pt education and wife offered support and states she is fine.  Wife to complete drive line dressing change when they get home and administer Oral htn medication asap.    PLAN:  MD Salcedo updated and will await paperwork

## 2024-11-10 NOTE — ED NOTES
Pt arrived via EMS, sent from home.  LVAD Coordinator called prior with information.   Pt just went home yesterday from hospital, seen by home health nurse today, and when pt got up to walk, he became very dizzy and lightheaded and fell to ground.  All involved report no injuries and pt did not hit his head.   Pt is alert and orientated, but is confused to events over the past few weeks and time frame of events leading up to, and after LVAD placement.   Wife is in route.       Pt reports he drank a small glass of water in AM, then another larger one later, and home health nurse instructed pt to drink as well.   Currently pt denies symptoms at rest.  MAP 62 - 88, R and L arm.       LAVD running well, numbers obtained and recorded.  See flowsheet for exact details.  Pt does NOT have his back up bag with him, states his wife is in route with it.   Education given on need to have bag with him at all times.  Will educate wife as well when she arrives.      Pt placed on full monitors, 20g IV started and labs drawn and sent.   500ml NS bolus given.   MD Gardner updated on all the above.         Cardiac:  Humm of LVAD heard, no adventitious noises heard.  NSR on monitor.  No edema noted.        Lungs:  CTA bilat.         Abd: soft, flat, non tender.  Pt reports hernia in L groin   PLAN:  Will continue to monitor, encourage fluids.  Pt doesn't think he ate at all.  Will offer food as well.

## 2024-11-10 NOTE — ED PROVIDER NOTES
History     Chief Complaint   Patient presents with    Fall    Dizziness     HPI  Duane C Johnson is a 74 year old male with documented PMH of anterior STEMI s/p PCI to the pLAD on 10/26/23 with a VT/VF arrest the next day (10/27), multiple subsequent admissions for HF exacerbation, underwent HM3 LVAD on 9/18/24, and yesterday hospital discharged from [facility] after near syncopal episodes and workup for workup of hypotension, hypovolemia, and low flows; presenting to the department today by EMS for evaluation after witnessed syncopal episode.  Patient is alert in the department and partially oriented but poor historian with regards to recent events over the past 2 months.  He says that he felt lightheaded today and fell down to the ground but did not strike his head or suffer injury.  He also does not believe he lost consciousness, although EMS reported that family witnessed a loss of consciousness episode lasting few minutes.  Seconds without seizure-like activity.  In the department patient denies headache, chest discomfort, or any physical symptoms.        Allergies:  Allergies   Allergen Reactions    Brilinta [Ticagrelor] Other (See Comments) and Difficulty breathing     Per pt and spouse, hyperventilation.    Clonazepam Other (See Comments)     Per spouse, acted like a zombie and he was shaky, could barely talk.       Problem List:    Patient Active Problem List    Diagnosis Date Noted    Other specified hypotension 10/24/2024     Priority: Medium    Physical deconditioning 10/17/2024     Priority: Medium    History of left ventricular assist device (LVAD) (H) 10/05/2024     Priority: Medium    Chronic systolic congestive heart failure (H) 09/20/2024     Priority: Medium    Anticoagulated on warfarin 09/20/2024     Priority: Medium    Personal history of malignant neoplasm of prostate 09/18/2024     Priority: Medium    Pleural effusion 09/18/2024     Priority: Medium    History of tobacco use 09/18/2024      Priority: Medium    AICD (automatic cardioverter/defibrillator) present 09/18/2024     Priority: Medium    Acute exacerbation of CHF (congestive heart failure) (H) 09/18/2024     Priority: Medium    Atrial fibrillation, unspecified type (H) 09/18/2024     Priority: Medium    LVAD (left ventricular assist device) present (H) 09/18/2024     Priority: Medium    Acute on chronic systolic congestive heart failure (H) 08/30/2024     Priority: Medium    Hypotension 06/11/2024     Priority: Medium    Ischemic cardiomyopathy 04/15/2024     Priority: Medium     25% EF      Anticoagulated 04/15/2024     Priority: Medium     Due to large akinetic area anterior heart       Left inguinal hernia 01/17/2024     Priority: Medium    Atherosclerosis of native coronary artery of native heart with angina pectoris (H) 01/02/2024     Priority: Medium     anterior STEMI s/p PCI to the pLAD on 10/26/23 with a VT/VF arrest the next day (10/27) with patent stents and unchanged anatomy on repeat angiogram       Anxiety 11/13/2023     Priority: Medium    Systolic CHF (H) 11/07/2023     Priority: Medium    Prostate cancer (H) 09/23/2023     Priority: Medium    Hyperlipidemia LDL goal <130 01/26/2016     Priority: Medium        Past Medical History:    Past Medical History:   Diagnosis Date    Benign essential hypertension     CAD (coronary artery disease)     Chronic systolic heart failure (H)     Hypertension     ST elevation myocardial infarction (STEMI), unspecified artery (H)     Ventricular fibrillation (H)        Past Surgical History:    Past Surgical History:   Procedure Laterality Date    ANESTHESIA CARDIOVERSION N/A 9/5/2024    Procedure: Anesthesia cardioversion;  Surgeon: GENERIC ANESTHESIA PROVIDER;  Location: [facility] OR    ANESTHESIA CARDIOVERSION N/A 9/30/2024    Procedure: Anesthesia cardioversion;  Surgeon: GENERIC ANESTHESIA PROVIDER;  Location: [facility] OR    COLONOSCOPY N/A 3/23/2023    Procedure: COLONOSCOPY, FLEXIBLE, WITH LESION  REMOVAL USING SNARE;  Surgeon: Jose Faustin MD;  Location: [facility]    CV CENTRAL VENOUS CATHETER PLACEMENT N/A 10/26/2023    Procedure: Central Venous Catheter Placement;  Surgeon: Rob Lyles MD;  Location: [facility] CARDIAC CATH LAB    CV CORONARY ANGIOGRAM N/A 10/27/2023    Procedure: Coronary Angiogram;  Surgeon: Rob Lyles MD;  Location: [facility] CARDIAC CATH LAB    CV CORONARY ANGIOGRAM N/A 10/26/2023    Procedure: Coronary Angiogram;  Surgeon: Rob Lyles MD;  Location: [facility] CARDIAC CATH LAB    CV LEFT HEART CATH N/A 10/26/2023    Procedure: Left Heart Catheterization;  Surgeon: Rob Lyles MD;  Location: [facility] CARDIAC CATH LAB    CV PCI N/A 10/27/2023    Procedure: Percutaneous Coronary Intervention;  Surgeon: Rob Lyles MD;  Location: [facility] CARDIAC CATH LAB    CV PCI STENT DRUG ELUTING N/A 10/26/2023    Procedure: Percutaneous Coronary Intervention Stent;  Surgeon: Rob Lyles MD;  Location: [facility] CARDIAC CATH LAB    CV RIGHT HEART CATH MEASUREMENTS RECORDED N/A 5/6/2024    Procedure: Heart Cath Right Heart Cath;  Surgeon: Rob Lyles MD;  Location: [facility] CARDIAC CATH LAB    CV RIGHT HEART CATH MEASUREMENTS RECORDED N/A 9/3/2024    Procedure: Right Heart Catheterization;  Surgeon: Aaron Mesa MD;  Location: [facility] CARDIAC CATH LAB    CV RIGHT HEART CATH MEASUREMENTS RECORDED N/A 10/24/2024    Procedure: Right Heart Catheterization;  Surgeon: Haile Braden MD;  Location: [facility] CARDIAC CATH LAB    EP ICD INSERT SINGLE N/A 5/8/2024    Procedure: Implantable Cardioverter Defibrillator Device & Lead Implant Dual;  Surgeon: Guero Black MD;  Location: [facility] CARDIAC CATH LAB    INJECTION, HYDROGEL SPACER N/A 4/22/2024    Procedure: INJECTION, HYDROGEL SPACER AND FIDUCIAL MARKER PLACEMENT;  Surgeon: Stanislaw Barros MD;  Location: [facility] OR    INSERT VENTRICULAR ASSIST  DEVICE LEFT (HEARTMATE II) N/A 2024    Procedure: Median Sternotomy, INSERTION of LEFT VENTRICULAR ASSIST DEVICE (HEARTMATE III), cardiopulmonary bypass, transesophageal echocardiogram performed by anesthesia.;  Surgeon: Mulvihill, Michael, MD;  Location: [facility] OR    IRRIGATION AND DEBRIDEMENT CHEST WASHOUT, COMBINED Right 2024    Procedure: Exploration of chest, chest washout;  Surgeon: Mulvihill, Michael, MD;  Location: [facility] OR       Family History:    Family History   Problem Relation Age of Onset    Other Cancer Mother     Obesity Mother     GI problems Father     Uterine Cancer Sister        Social History:  Marital Status:   [2]  Social History     Tobacco Use    Smoking status: Former     Current packs/day: 0.00     Average packs/day: 0.3 packs/day for 30.0 years (7.5 ttl pk-yrs)     Types: Cigarettes     Start date: 10/26/1993     Quit date: 10/26/2023     Years since quittin.0     Passive exposure: Past    Smokeless tobacco: Never    Tobacco comments:     Quit 10/26/2023   Vaping Use    Vaping status: Never Used   Substance Use Topics    Alcohol use: Yes     Comment: 1-2 beers per day    Drug use: No        Medications:    acetaminophen (TYLENOL) 325 MG tablet  amiodarone (PACERONE) 200 MG tablet  amLODIPine (NORVASC) 10 MG tablet  atorvastatin (LIPITOR) 80 MG tablet  digoxin (LANOXIN) 62.5 MCG tablet  escitalopram (LEXAPRO) 10 MG tablet  gabapentin (NEURONTIN) 100 MG capsule  hydrALAZINE (APRESOLINE) 25 MG tablet  losartan (COZAAR) 50 MG tablet  magnesium hydroxide (MILK OF MAGNESIA) 400 MG/5ML suspension  magnesium oxide (MAG-OX) 400 MG tablet  melatonin 10 MG TABS tablet  multivitamin w/minerals (THERA-VIT-M) tablet  warfarin ANTICOAGULANT (COUMADIN) 2.5 MG tablet          Review of Systems   ROS: 10 point ROS neg other than the symptoms noted above in the HPI.    Physical Exam   BP: (!) 71/57  Pulse: 72  Temp: 98.1  F (36.7  C)  Resp: 20  Weight: 61.2 kg (135 lb)  SpO2: 94  %      Physical Exam  Constitutional:  Well developed, well nourished.  Appears nontoxic and in no acute distress.  Resting comfortably on the gurney.  HENT:  Normocephalic and atraumatic.  Symmetric in appearance.  Eyes:  Conjunctivae are normal.  Cardiovascular:  No cyanosis.    Respiratory:  Effort normal without sign of respiratory distress.  No audible wheezing or stridor.  CTAB.   Gastrointestinal:  Soft nondistended abdomen.  Nontender and without guarding.  No rigidity or rebound tenderness.  Negative Miner's sign.  Negative McBurney's point.    Musculoskeletal:  Moves extremities spontaneously.  Neurological:  Patient is alert and oriented to place but not time.  Skin:  Skin is warm and dry.  Psychiatric:  Normal mood and affect.      ED Course        Procedures                  Results for orders placed or performed during the hospital encounter of 11/10/24 (from the past 24 hours)   CBC with platelets, differential    Narrative    The following orders were created for panel order CBC with platelets, differential.  Procedure                               Abnormality         Status                     ---------                               -----------         ------                     CBC with platelets and d...[[id number]]  Abnormal            Final result                 Please view results for these tests on the individual orders.   Comprehensive metabolic panel   Result Value Ref Range    Sodium 127 (L) 135 - 145 mmol/L    Potassium 4.5 3.4 - 5.3 mmol/L    Carbon Dioxide (CO2) 27 22 - 29 mmol/L    Anion Gap 7 7 - 15 mmol/L    Urea Nitrogen 28.3 (H) 8.0 - 23.0 mg/dL    Creatinine 0.73 0.67 - 1.17 mg/dL    GFR Estimate >90 >60 mL/min/1.73m2    Calcium 8.9 8.8 - 10.4 mg/dL    Chloride 93 (L) 98 - 107 mmol/L    Glucose 91 70 - 99 mg/dL    Alkaline Phosphatase 116 40 - 150 U/L    AST 51 (H) 0 - 45 U/L    ALT 61 0 - 70 U/L    Protein Total 6.8 6.4 - 8.3 g/dL    Albumin 3.4 (L) 3.5 - 5.2 g/dL    Bilirubin  Total 0.4 <=1.2 mg/dL   CBC with platelets and differential   Result Value Ref Range    WBC Count 5.5 4.0 - 11.0 10e3/uL    RBC Count 3.36 (L) 4.40 - 5.90 10e6/uL    Hemoglobin 10.5 (L) 13.3 - 17.7 g/dL    Hematocrit 31.1 (L) 40.0 - 53.0 %    MCV 93 78 - 100 fL    MCH 31.3 26.5 - 33.0 pg    MCHC 33.8 31.5 - 36.5 g/dL    RDW 15.0 10.0 - 15.0 %    Platelet Count 150 150 - 450 10e3/uL    % Neutrophils 79 %    % Lymphocytes 7 %    % Monocytes 11 %    % Eosinophils 1 %    % Basophils 1 %    % Immature Granulocytes 0 %    NRBCs per 100 WBC 0 <1 /100    Absolute Neutrophils 4.4 1.6 - 8.3 10e3/uL    Absolute Lymphocytes 0.4 (L) 0.8 - 5.3 10e3/uL    Absolute Monocytes 0.6 0.0 - 1.3 10e3/uL    Absolute Eosinophils 0.1 0.0 - 0.7 10e3/uL    Absolute Basophils 0.1 0.0 - 0.2 10e3/uL    Absolute Immature Granulocytes 0.0 <=0.4 10e3/uL    Absolute NRBCs 0.0 10e3/uL       Medications   sodium chloride 0.9% BOLUS 500 mL (0 mLs Intravenous Stopped 11/10/24 1212)   sodium chloride 0.9% BOLUS 1,000 mL (1,000 mLs Intravenous $New Bag 11/10/24 1321)       Assessments & Plan (with Medical Decision Making)   Duane C Johnson is a 74 year old male who presents to the department via EMS for evaluation after witnessed episode of responsiveness resulting in fall without report of injury.  Patient arrived to the department alert and without pain or complaint.  Spouse later came to the department and reported that patient was discharged from hospital yesterday, although did not eat or drink prior to getting out of bed and starting his day.  Consulted on-call LVAD cardiologist Dr. Lee who knows the patient well, suggest that the patient will likely need aggressive hydration daily at home to avoid bouts of orthostasis.  Lab work is overall reassuring.  Patient tolerated oral hydration well and ate a significant amount of calories, since ambulatory in the department without symptoms.  Spent a significant amount of time talking with patient and  spouse about the importance of hydration and caution with ambulation in the future, plan to follow-up with cardiology for management of LVAD.          Disclaimer:  This note consists of symbols derived from keyboarding, dictation, and/or voice recognition software.  As a result, there may be errors in the script that have gone undetected.  Please consider this when interpreting information found in the chart.      I have reviewed the nursing notes.    I have reviewed the findings, diagnosis, plan and need for follow up with the patient.          New Prescriptions    No medications on file       Final diagnoses:   Near syncope       11/10/2024   [facility] EMERGENCY DEPT       Nile Salcedo DO  11/10/24 [id number]

## 2024-11-10 NOTE — PLAN OF CARE
Discharged to: Home   Via: Automobile  Accompanied by: Spouse, brother-in-law    Discharge Instructions: Discussed with Pt, Melissa (wife), and pt's brother-in-law at bedside.    Reviewed post-hospital discharge activity restrictions including the need for a DMV Behind the Wheel test prior to returning to driving. Post-discharge fluid intake also highlighted with the importance of drinking 2-3L daily, including gatorade and Ensure. Reviewed outpatient home care orders and referrals ordered for Cardiac Rehab and Neurology.    Wife expressed concern about pt not discharging home without a doppler to take MAP readings at home. On-call VAD Coordinator contacted as to typical procedure of obtaining a doppler. Information received from coordinator relayed to pt and spouse, notifying them that pt's do not obtain a doppler until they are home and they will need to contact the medical supplier to inform them of pt's discharge to home and the need for a doppler. The medical supplier phone number highlighted. Patient and spouse feel comfortable with this plan.    Prescriptions: Rx's were filled at [facility], per pt's request; medication list reviewed with patient and spouse--all questions were answered and updated list was given to pt.     Follow Up Appointments: Patient and spouse aware of upcoming appointment with Cardiology on 11/13 and that pt's LVAD Coordinator will contact pt on 11/11 to see if this appointment can be changed to better accommodate pt and spouse's schedule.       Belongings: All sent with pt  IV: removed  Telemetry: taken off    Pt and spouse verbalized understanding of discharge instructions and all questions were answered. A physical copy of discharge instructions were provided to pt.    Allison Cosme RN   Cardiology

## 2024-11-10 NOTE — TELEPHONE ENCOUNTER
Prior Authorization Retail Medication Request    Medication/Dose: DIGOXIN 62.5MCG TABS  Diagnosis and ICD code (if different than what is on RX):    New/renewal/insurance change PA/secondary ins. PA:  Previously Tried and Failed:    Rationale:      Insurance   Primary: [employer]   Insurance ID: [id number]    Pharmacy Information (if different than what is on RX)  Name: [facility]  Phone: [phone]   Fax: [phone]    Clinic Information  Preferred routing pool for dept communication:     Thank you,  aYsmin Sánchez, Pharm Tech  [facility]

## 2024-11-10 NOTE — TELEPHONE ENCOUNTER
D:  Home care RN (Abby) called to report that the pt was experiencing dizziness when standing and fell to the ground.  I:  Device readings: Speed 4800, Flow 3.2, PI 2.1, Power 3.1.  She reported that when he fell the flow dropped to 1.4 briefly, and PIs remain low (1.4-2.0).  She reports that he was walking with his walker but changes position very quickly and walks very quickly.  He was able to get back up from the floor to a sitting position on a stool but remained dizzy.  His wife stated that he has not had much to drink so far today.  They were amenable to going to the ED in [place] ([facility]) for evaluation and possibly hydration.  Abby was going to call the paramedics to help with transport.     Writer notified the [facility] ED and reviewed the case with Dr. Nicolas who was supportive of the plan.  A:  Dizziness, brief low device flow, fall potentially related to dehydration.  P:  To ED for evaluation.

## 2024-11-11 ENCOUNTER — TELEPHONE (OUTPATIENT)
Dept: ANTICOAGULATION | Facility: CLINIC | Age: 74
End: 2024-11-11
Payer: MEDICARE

## 2024-11-11 ENCOUNTER — CARE COORDINATION (OUTPATIENT)
Dept: CARDIOLOGY | Facility: CLINIC | Age: 74
End: 2024-11-11
Payer: MEDICARE

## 2024-11-11 ENCOUNTER — PATIENT OUTREACH (OUTPATIENT)
Dept: CARE COORDINATION | Facility: CLINIC | Age: 74
End: 2024-11-11
Payer: MEDICARE

## 2024-11-11 DIAGNOSIS — Z79.01 ANTICOAGULATED ON WARFARIN: ICD-10-CM

## 2024-11-11 DIAGNOSIS — Z95.811 LVAD (LEFT VENTRICULAR ASSIST DEVICE) PRESENT (H): ICD-10-CM

## 2024-11-11 DIAGNOSIS — Z09 HOSPITAL DISCHARGE FOLLOW-UP: ICD-10-CM

## 2024-11-11 DIAGNOSIS — I50.22 CHRONIC SYSTOLIC CONGESTIVE HEART FAILURE (H): Primary | ICD-10-CM

## 2024-11-11 DIAGNOSIS — I95.89 OTHER SPECIFIED HYPOTENSION: ICD-10-CM

## 2024-11-11 ASSESSMENT — ACTIVITIES OF DAILY LIVING (ADL): DEPENDENT_IADLS:: INDEPENDENT

## 2024-11-11 NOTE — LETTER
Mechanical Circulatory Support Program  [address], [facility] [address] [place] [address]  [phone] Office Phone  [phone] Fax Number  HeartMate 3 LVAD    Contacts: VAD Coordinating Team: [phone]    24/7 on-call VAD Coordinator: [phone], choose option 4 to speak to the  - Ask him/her to page the VAD coordinator on call.    Guidelines and Precautions for OP-cardiac rehab.    Patients can do Stairmaster, bike or NuStep. Limit knee lifts and avoid rowing machine or exercises that include twisting in the abdomen.    Encourage activities such as: Treadmill, stair climbing, and arm ergometer.    Attempt to achieve a target of 11-14 on the Rating of Perceived Exertion or RPE scale.    In some cases we do encourage light muscle conditioning with upper and lower body.  Patient's native heart rate should not exceed 140 BPM during exercise without an order.    Telemetry will be helpful to identify sustained V-tach or other underlying arrhythmias. Keep in mind that the patient may or may not be symptomatic with arrhythmias. The pump does not affect the patient's heart rhythm.    Manual and automatic cuff blood pressures may be difficult to obtain due to the narrow pulse pressure created by the continuous (rather than pulsatile) flow pump.    This video demonstrates how to obtain a doppler blood pressure on a patient with an LVAD: [email or web address]-measure-blood-pressure-person-continuous-flow or scan this QR Code:       Most patient's have their own doppler at home, you can ask if they do and if they can bring it to rehab.    Oximetry readings may also be difficult to obtain due to low pulsatility.    Symptoms that may result from activity intolerance: lightheadedness, SOB, Tachycardia, exaggerated BP response.    Patients should always wear driveline anchor and have controller secured during exercise.    Sternotomy  precautions for 6-8 weeks post-op.    Lifelong with VAD we do not encourage lifting over 20lbs as it can cause strain on the abdomen and driveline.    The patient's Travel Bag should accompany him/her at all times complete with back-up controller, battery clips, and spare batteries.    A set of 2 batteries will last 12-16 hours.    The goal is for the patient to achieve 30 minutes of continuous treadmill walking prior to heart transplantation.    Follow ACLS/BLS guidelines. Chest compressions if signs of inadequate perfusion. Use defibrillation as normal.      For further questions please contact the on-call VAD Coordinator at [phone] then choose option 4 and ask for the VAD Coordinator on-call.     If you would like more information about the Heartmate 3 LVAD, please visit [email or web address].

## 2024-11-11 NOTE — TELEPHONE ENCOUNTER
ANTICOAGULATION  MANAGEMENT: Discharge Review    Duane C Johnson chart reviewed for anticoagulation continuity of care    Hospital Admission on 10/23-11/9/24 for ARU/clinic visit on 10/5/24 due to  frequent low flow alarms on your LVAD with hypotension,  lightheadedness, dehydration, and ongoing need for  frequent medication adjustments.    11/10/24 Patient went to the ER for LOC.  Patient did not hit head but was brought in by EMS.  Patient was given hydration therapy and discharged to home    Discharge disposition: Home with [facility] -Home Health  Services: Home Health Services  Address: [address], [address], [facility] [address]  Phone: [phone]    Results:    Recent labs: (last 7 days)     11/05/24  0512 11/06/24  0623 11/07/24  0610 11/08/24  0609 11/09/24  0540   INR 2.50* 2.46* 2.24* 2.40* 2.22*     Anticoagulation inpatient management:     anticoagulation calendar updated with inpatient dosing    Anticoagulation discharge instructions:     Warfarin dosing: Next INR 11/12/24 and 2.5mg daily   Bridging: No   INR goal change: No      Medication changes affecting anticoagulation: Yes: Patient started Amlodipine-Concurrent use of AMLODIPINE and WARFARIN may result in increased warfarin exposure and an increased INR. Patient stopped Protonix-Concurrent use of PANTOPRAZOLE and WARFARIN may result in increased International Normalized Ratio (INR) and prothrombin time. Amiodarone dose was changed to 200mg daily. Lipitor, Lexapro, Neurontin    Additional factors affecting anticoagulation: Yes: Patient was hospitalized for a long period of time.  Family is very overwhelmed.  Patient suffers from dehydration. Patient was drinking Ensure Alive in the hospital     PLAN     Recommend to check INR on 11/12/24    Spoke with Melissa . Writer has call into [facility].  It they are able to do INR testing and call results to [facility].  Melissa also has questions about home care.   Melissa was under the impression that if Duane has skilled nursing care then rehab isn't an option for him.  Writer will clarify this once [facility] calls back.     Writer hears back from [facility] and since patient is going into clinic for cardiac rehab he doesn't qualify for skilled nursing care. Writer calls Melissa back and explains situation.  Melissa verbalizes understanding.     Anticoagulation Calendar updated    Namrata Walker RN  11/11/2024  Anticoagulation Clinic  [facility] for routing messages: susannah MARTINEZ LVAD  [facility] patient phone line: [phone]

## 2024-11-11 NOTE — PROGRESS NOTES
Clinic Care Coordination Contact  Care Coordination Clinician Chart Review    Situation: Patient chart reviewed by care coordinator.    Background: Clinic Care Coordination Referral received from inpatient care team for transition handoff communication following hospital admission.    Assessment: Upon chart review, patient is not a candidate for Primary Care Clinic Care Coordination enrollment due to reason stated below:  Primary Care Clinic Care Coordination will defer follow-up outreach to Specialty Clinic Care Coordination team who are already closely following patient.    Plan/Recommendations: Clinic Care Coordination Referral/order cancelled. RN/STACIA CC will perform no further monitoring/outreaches at this time and will remain available as needed. If new needs arise, a new Care Coordination Referral may be placed.    Patient received a hospital follow up call today by the Cardiology RN     [facility]   Ruthy Paiz RN, Care Coordinator   [facility]'s   E-mail [email or web address]   [phone]

## 2024-11-11 NOTE — PROGRESS NOTES
Situation:   Called and spoke with Patient, Family to follow up after recent hospitalization.     Background:  Patient was recently admitted from 8/30/24 to 11/9/24, for heart failure, new vad implant. Went to local ED on 11/10/24 for dizziness.    Assessment:  Patient states since discharge they are feeling well- having gatorade overnight w/ bathroom trips.   Answered questions regarding their care and discharge plan.  Patient/Caregiver state understanding of needing to call dressing supply company to get supplies ordered for LVAD drive line exit site  Reviewed medications and ensured that they picked up their new medications : yes, reviewed   Aware that they need to  Digoxin at local pharmacy today.  Reviewed any updates to INR goal and ensured anticoagulation clinic referral is correct.      Weight today: 136 lb. Compared to recent values this weight is remained the same.   VAD Parameters:        11/11/24 1142   Heartmate 3 LEFT VS   Flow (Lpm) 3.5 Lpm   Pulse Index (PI) 3.3 PI   Speed (rpm) 5000 rpm   Power (jackson) 3.2 jackson     NIBP/MAP: NA  Symptoms:   Heart failure symptoms: None, denies dizziness. Endorsed increasing fluid intake overnight and this morning after ED visit yesterday.    Recommendation:  Patient, Caregiver verbalized understanding and will call with further questions concerns. Follow up appointment scheduled for 11/13    Reviewed hydration plans: Recommend aiming for 2.5-3 L of fluid daily. Drink 1-2 Gatorades daily and 1-2 Ensures daily.    Referral to neurology for memory care- call [phone]    Cardiac Rehab - set up for [facility], [phone] (F) [phone]    Home care-  [facility]  [phone]- has discharged patient already.

## 2024-11-12 DIAGNOSIS — Z95.811 LVAD (LEFT VENTRICULAR ASSIST DEVICE) PRESENT (H): ICD-10-CM

## 2024-11-12 DIAGNOSIS — I50.22 CHRONIC SYSTOLIC HEART FAILURE (H): Primary | ICD-10-CM

## 2024-11-12 NOTE — TELEPHONE ENCOUNTER
Central Prior Authorization Team   Phone: [phone]    PA Initiation    Medication: DIGOXIN 62.5MCG TABS  Insurance Company: [employer] - Phone [phone] Fax [phone]  Pharmacy Filling the Rx: [place], [place] - [address]  Filling Pharmacy Phone: [phone]  Filling Pharmacy Fax:    Start Date: 11/12/2024      Faxed manual form to [facility] part D for review

## 2024-11-13 ENCOUNTER — ANTICOAGULATION THERAPY VISIT (OUTPATIENT)
Dept: ANTICOAGULATION | Facility: CLINIC | Age: 74
End: 2024-11-13

## 2024-11-13 ENCOUNTER — LAB (OUTPATIENT)
Dept: LAB | Facility: CLINIC | Age: 74
End: 2024-11-13
Payer: MEDICARE

## 2024-11-13 ENCOUNTER — VIRTUAL VISIT (OUTPATIENT)
Facility: CLINIC | Age: 74
End: 2024-11-13
Attending: INTERNAL MEDICINE
Payer: MEDICARE

## 2024-11-13 ENCOUNTER — OFFICE VISIT (OUTPATIENT)
Dept: CARDIOLOGY | Facility: CLINIC | Age: 74
End: 2024-11-13
Attending: PHYSICIAN ASSISTANT
Payer: MEDICARE

## 2024-11-13 VITALS — OXYGEN SATURATION: 96 % | WEIGHT: 141.5 LBS | BODY MASS INDEX: 22.16 KG/M2

## 2024-11-13 DIAGNOSIS — I50.22 CHRONIC SYSTOLIC CONGESTIVE HEART FAILURE (H): Primary | ICD-10-CM

## 2024-11-13 DIAGNOSIS — Z79.01 ANTICOAGULATED ON WARFARIN: ICD-10-CM

## 2024-11-13 DIAGNOSIS — I50.22 CHRONIC SYSTOLIC CONGESTIVE HEART FAILURE (H): ICD-10-CM

## 2024-11-13 DIAGNOSIS — I25.2 HISTORY OF ST ELEVATION MYOCARDIAL INFARCTION (STEMI): ICD-10-CM

## 2024-11-13 DIAGNOSIS — Z95.811 LVAD (LEFT VENTRICULAR ASSIST DEVICE) PRESENT (H): ICD-10-CM

## 2024-11-13 DIAGNOSIS — F41.9 ANXIETY: ICD-10-CM

## 2024-11-13 DIAGNOSIS — I48.91 ATRIAL FIBRILLATION, UNSPECIFIED TYPE (H): ICD-10-CM

## 2024-11-13 DIAGNOSIS — Z78.9 TAKES DIETARY SUPPLEMENTS: ICD-10-CM

## 2024-11-13 DIAGNOSIS — I50.42 CHRONIC COMBINED SYSTOLIC AND DIASTOLIC HEART FAILURE (H): Primary | ICD-10-CM

## 2024-11-13 DIAGNOSIS — K59.00 CONSTIPATION: ICD-10-CM

## 2024-11-13 DIAGNOSIS — I95.89 OTHER SPECIFIED HYPOTENSION: ICD-10-CM

## 2024-11-13 LAB
ALBUMIN SERPL BCG-MCNC: 3.8 G/DL (ref 3.5–5.2)
ALP SERPL-CCNC: 117 U/L (ref 40–150)
ALT SERPL W P-5'-P-CCNC: 64 U/L (ref 0–70)
ANION GAP SERPL CALCULATED.3IONS-SCNC: 9 MMOL/L (ref 7–15)
AST SERPL W P-5'-P-CCNC: 50 U/L (ref 0–45)
BILIRUB SERPL-MCNC: 0.4 MG/DL
BUN SERPL-MCNC: 19.2 MG/DL (ref 8–23)
CALCIUM SERPL-MCNC: 9.1 MG/DL (ref 8.8–10.4)
CHLORIDE SERPL-SCNC: 94 MMOL/L (ref 98–107)
CREAT SERPL-MCNC: 0.63 MG/DL (ref 0.67–1.17)
EGFRCR SERPLBLD CKD-EPI 2021: >90 ML/MIN/1.73M2
ERYTHROCYTE [DISTWIDTH] IN BLOOD BY AUTOMATED COUNT: 15.1 % (ref 10–15)
FERRITIN SERPL-MCNC: 285 NG/ML (ref 31–409)
GLUCOSE SERPL-MCNC: 86 MG/DL (ref 70–99)
HCO3 SERPL-SCNC: 25 MMOL/L (ref 22–29)
HCT VFR BLD AUTO: 31.3 % (ref 40–53)
HGB BLD-MCNC: 10.6 G/DL (ref 13.3–17.7)
INR PPP: 2.61 (ref 0.85–1.15)
IRON BINDING CAPACITY (ROCHE): 234 UG/DL (ref 240–430)
IRON SATN MFR SERPL: 19 % (ref 15–46)
IRON SERPL-MCNC: 45 UG/DL (ref 61–157)
LDH SERPL L TO P-CCNC: 217 U/L (ref 0–250)
MCH RBC QN AUTO: 30.4 PG (ref 26.5–33)
MCHC RBC AUTO-ENTMCNC: 33.9 G/DL (ref 31.5–36.5)
MCV RBC AUTO: 90 FL (ref 78–100)
NT-PROBNP SERPL-MCNC: 3150 PG/ML (ref 0–900)
PLATELET # BLD AUTO: 167 10E3/UL (ref 150–450)
POTASSIUM SERPL-SCNC: 4.3 MMOL/L (ref 3.4–5.3)
PROT SERPL-MCNC: 7.2 G/DL (ref 6.4–8.3)
RBC # BLD AUTO: 3.49 10E6/UL (ref 4.4–5.9)
SODIUM SERPL-SCNC: 128 MMOL/L (ref 135–145)
TRANSFERRIN SERPL-MCNC: 194 MG/DL (ref 200–360)
WBC # BLD AUTO: 5.4 10E3/UL (ref 4–11)

## 2024-11-13 PROCEDURE — 85610 PROTHROMBIN TIME: CPT | Performed by: PATHOLOGY

## 2024-11-13 PROCEDURE — 99000 SPECIMEN HANDLING OFFICE-LAB: CPT | Performed by: PATHOLOGY

## 2024-11-13 PROCEDURE — 93750 INTERROGATION VAD IN PERSON: CPT | Performed by: INTERNAL MEDICINE

## 2024-11-13 PROCEDURE — 83540 ASSAY OF IRON: CPT | Performed by: PATHOLOGY

## 2024-11-13 PROCEDURE — 85027 COMPLETE CBC AUTOMATED: CPT | Performed by: PATHOLOGY

## 2024-11-13 PROCEDURE — 82728 ASSAY OF FERRITIN: CPT | Performed by: PATHOLOGY

## 2024-11-13 PROCEDURE — 84466 ASSAY OF TRANSFERRIN: CPT | Performed by: INTERNAL MEDICINE

## 2024-11-13 PROCEDURE — G0463 HOSPITAL OUTPT CLINIC VISIT: HCPCS | Performed by: INTERNAL MEDICINE

## 2024-11-13 PROCEDURE — 80053 COMPREHEN METABOLIC PANEL: CPT | Performed by: PATHOLOGY

## 2024-11-13 PROCEDURE — 83550 IRON BINDING TEST: CPT | Performed by: PATHOLOGY

## 2024-11-13 PROCEDURE — 83615 LACTATE (LD) (LDH) ENZYME: CPT | Performed by: PATHOLOGY

## 2024-11-13 PROCEDURE — 83880 ASSAY OF NATRIURETIC PEPTIDE: CPT | Performed by: PATHOLOGY

## 2024-11-13 PROCEDURE — 84238 ASSAY NONENDOCRINE RECEPTOR: CPT | Mod: 90 | Performed by: PATHOLOGY

## 2024-11-13 PROCEDURE — 36415 COLL VENOUS BLD VENIPUNCTURE: CPT | Performed by: PATHOLOGY

## 2024-11-13 ASSESSMENT — PAIN SCALES - GENERAL: PAINLEVEL_OUTOF10: NO PAIN (0)

## 2024-11-13 NOTE — PROGRESS NOTES
ANTICOAGULATION MANAGEMENT     Duane C Johnson 74 year old male is on warfarin with therapeutic INR result. (Goal INR 2.0-3.0)    Recent labs: (last 7 days)     11/13/24  0910   INR 2.61*       ASSESSMENT     Source(s): Chart Review and Patient/Caregiver Call     Warfarin doses taken: Warfarin taken as instructed  Diet:  recent hospitalization/dehydration may be affecting diet and INR  Medication/supplement changes:  amiodarone prescribed during hospitalization, Protonix stopped and digoxin started  New illness, injury, or hospitalization: Yes: recent hospitalization 10/23/24-11/9/24 for ARU, dehydration,light headedness and low flow alarms. LOC on 11/10/24 in the ER then discharged to home  Signs or symptoms of bleeding or clotting: No  Previous result: Therapeutic last 2(+) visits  Additional findings: Recheck INR again while at follow up visit on 11/18/24       PLAN     Recommended plan for ongoing change(s) affecting INR     Dosing Instructions: decrease your warfarin dose (7.1% change) with next INR in 5 days       Summary  As of 11/13/2024      Full warfarin instructions:  1.25 mg every Wed; 2.5 mg all other days   Next INR check:  11/18/2024               Telephone call with Melissa (wife) who agrees to plan and repeated back plan correctly    Check at provider office visit    Education provided: Contact [phone] with any changes, questions or concerns.     Plan made with [facility] Pharmacist Pratima Gabriel and per LVAD protocol    Paulina Martinez RN  11/13/2024  Anticoagulation Clinic  [facility] for routing messages: p ANTICOAG LVAD  [facility] patient phone line: [phone]        _______________________________________________________________________     Anticoagulation Episode Summary       Current INR goal:  2.0-3.0   TTR:  --   Target end date:  Indefinite   Send INR reminders to:  ANTICOAG LVAD    Indications    Chronic systolic congestive heart failure (H) [I50.22]  Anticoagulated on warfarin [Z79.01]  LVAD  (left ventricular assist device) present (H) [Z95.811]  Other specified hypotension [I95.89]             Comments:  Follow VAD Anticoag protocol:Yes: HeartMate 3  Bridging: Call MD each time due to time since implant surgery  Date VAD placed: 9/18/2024                 Anticoagulation Care Providers       Provider Role Specialty Phone number    Ham Cruz MD Referring Advanced Heart Failure and Transplant Cardiology [phone]    Sravanthi Asencio MD Referring Cardiovascular Disease [phone]

## 2024-11-13 NOTE — NURSING NOTE
Chief Complaint   Patient presents with    Follow Up     RETURN VAD     Vitals were taken and medications reconciled.    Raymond Velasquez, EMT  10:07 AM

## 2024-11-13 NOTE — LETTER
2024      RE: Duane C Johnson  [id number] [address] [facility] [address]       Dear Colleague,    Thank you for the opportunity to participate in the care of your patient, Duane C Johnson, at the [facility] HEART [facility] at [facility]. Please see a copy of my visit note below.    [facility]  Advanced Heart Failure Clinic    Patient Name: Duane C Johnson   : 1950   Date of Visit: 2024    Primary Care Physician: Jose Coles MD     Referring Physician: No ref. provider found   Reason for Visit: LVAD    Dear Dr. Sanket MD     I had the pleasure of seeing Duane C Johnson today in the [facility]. As you know Duane C Johnson is a pleasant 74 year old year old male, who presents today for further evaluation of LVAD.    Duane has a history of end-stage cardiomyopathy, history of coronary artery disease, previous STEMI, history of VT VF arrest, status post placement of ICD, history of A-fib a flutter with previous cardioversion, status post HeartMate 3 LVAD implantation 2024.    Duane was discharged to ARU on 10/5/24. There, he continued to have frequent low flow alarms with labile BPs and has required frequent adjustments to BP medications, initiation of florinef, as well as IVF and increased PO intake. Seen in clinic on 10/23 where he was presyncopal with MAPs in 40s. He was sent from clinic to ED for further evaluation. Admitted 10/23/24 for further workup of hypotension, hypovolemia, and low flows. Underwent stroke eval for neurological changes with negative CT. Symptoms improved following 2L IVF and improved MAPs. He was resumed on low doses of GDMT/afterload reduction. During this hospitalization continued to have several more runs of low flows. MAPs/meds optimized and prior to discharge he was without low flows/high PIs x 3 days. Controlled was also switched to low flow controlled.  Discharge was delayed due to needing additional LVAD education. Consulted neuropsych due to concerns for cognitive changes - Alzheimer's disease or possibly mixed pathology (mild cognitive impairment).     Following discharge, he presented to the emergency room on 11/10/2024 with witnessed syncopal episode, feeling lightheaded which led to a fall to the ground but he did not strike his head or suffer any injury.  He reported that he did not lose consciousness although EMS reported that family witnessed a brief loss of consciousness episode.  The ER spoke to Dr. Nicolas, who suggested aggressive hydration as he is known to have hypovolemia and orthostasis and he had not had much to eat or drink that day before this episode. He did not have a low flow alarm then.    Since then no further concerns. He felt minimal lightheadedness once on getting up but no recurrence. He is forcibly drinking at least 96 oz water/ gatorade, one 14 oz of Core 42 protein drink, mountain dew just one each day. He is mostly not using his walker, and reports he is using it if walking further than just going from couch to bathroom (about 20 feet) when he holds on to walls/doorways. His wife is with him 24x7, she works from home.    Starting cardiac rehab 11/18/24    Home BPs - yet to receive Doppler  Home weights - not checking    ROS:  A complete 10-point ROS was negative except as above.    PAST MEDICAL HISTORY:  Past Medical History:   Diagnosis Date     Benign essential hypertension      CAD (coronary artery disease)      Chronic systolic heart failure (H)      Hypertension      ST elevation myocardial infarction (STEMI), unspecified artery (H)      Ventricular fibrillation (H)        PAST SURGICAL HISTORY:  Past Surgical History:   Procedure Laterality Date     ANESTHESIA CARDIOVERSION N/A 9/5/2024    Procedure: Anesthesia cardioversion;  Surgeon: GENERIC ANESTHESIA PROVIDER;  Location: [facility]     ANESTHESIA CARDIOVERSION N/A 9/30/2024     Procedure: Anesthesia cardioversion;  Surgeon: GENERIC ANESTHESIA PROVIDER;  Location: [facility]     COLONOSCOPY N/A 3/23/2023    Procedure: COLONOSCOPY, FLEXIBLE, WITH LESION REMOVAL USING SNARE;  Surgeon: Jose Faustin MD;  Location: [facility]     CV CENTRAL VENOUS CATHETER PLACEMENT N/A 10/26/2023    Procedure: Central Venous Catheter Placement;  Surgeon: Rob Lyles MD;  Location:  HEART CARDIAC CATH LAB     CV CORONARY ANGIOGRAM N/A 10/27/2023    Procedure: Coronary Angiogram;  Surgeon: Rob Lyles MD;  Location: [facility] CARDIAC CATH LAB     CV CORONARY ANGIOGRAM N/A 10/26/2023    Procedure: Coronary Angiogram;  Surgeon: Rob Lyles MD;  Location: [facility] CARDIAC CATH LAB     CV LEFT HEART CATH N/A 10/26/2023    Procedure: Left Heart Catheterization;  Surgeon: Rob Lyles MD;  Location: [facility] CARDIAC CATH LAB     CV PCI N/A 10/27/2023    Procedure: Percutaneous Coronary Intervention;  Surgeon: Rob Lyles MD;  Location: [facility] CARDIAC CATH LAB     CV PCI STENT DRUG ELUTING N/A 10/26/2023    Procedure: Percutaneous Coronary Intervention Stent;  Surgeon: Rob Lyles MD;  Location: [facility] CARDIAC CATH LAB     CV RIGHT HEART CATH MEASUREMENTS RECORDED N/A 5/6/2024    Procedure: Heart Cath Right Heart Cath;  Surgeon: Rob Lyles MD;  Location:  HEART CARDIAC CATH LAB     CV RIGHT HEART CATH MEASUREMENTS RECORDED N/A 9/3/2024    Procedure: Right Heart Catheterization;  Surgeon: Aaron Mesa MD;  Location: [facility] CARDIAC CATH LAB     CV RIGHT HEART CATH MEASUREMENTS RECORDED N/A 10/24/2024    Procedure: Right Heart Catheterization;  Surgeon: Haile Braden MD;  Location: [facility] CARDIAC CATH LAB     EP ICD INSERT SINGLE N/A 5/8/2024    Procedure: Implantable Cardioverter Defibrillator Device & Lead Implant Dual;  Surgeon: Guero Black MD;  Location: [facility] CARDIAC CATH LAB     INJECTION,  HYDROGEL SPACER N/A 2024    Procedure: INJECTION, HYDROGEL SPACER AND FIDUCIAL MARKER PLACEMENT;  Surgeon: Stanislaw Barros MD;  Location: [facility]     INSERT VENTRICULAR ASSIST DEVICE LEFT (HEARTMATE II) N/A 2024    Procedure: Median Sternotomy, INSERTION of LEFT VENTRICULAR ASSIST DEVICE (HEARTMATE III), cardiopulmonary bypass, transesophageal echocardiogram performed by anesthesia.;  Surgeon: Mulvihill, Michael, MD;  Location: [facility]     IRRIGATION AND DEBRIDEMENT CHEST WASHOUT, COMBINED Right 2024    Procedure: Exploration of chest, chest washout;  Surgeon: Mulvihill, Michael, MD;  Location: [facility]       FAMILY HISTORY:  Family History   Problem Relation Age of Onset     Other Cancer Mother      Obesity Mother      GI problems Father      Uterine Cancer Sister        SOCIAL HISTORY:  Social History     Socioeconomic History     Marital status:      Spouse name: None     Number of children: None     Years of education: None     Highest education level: None   Tobacco Use     Smoking status: Former     Current packs/day: 0.00     Average packs/day: 0.3 packs/day for 30.0 years (7.5 ttl pk-yrs)     Types: Cigarettes     Start date: 10/26/1993     Quit date: 10/26/2023     Years since quittin.0     Passive exposure: Past     Smokeless tobacco: Never     Tobacco comments:     Quit 10/26/2023   Vaping Use     Vaping status: Never Used   Substance and Sexual Activity     Alcohol use: Yes     Comment: 1-2 beers per day     Drug use: No     Sexual activity: Yes     Partners: Female     Birth control/protection: None   Other Topics Concern     Parent/sibling w/ CABG, MI or angioplasty before 65F 55M? No     Social Drivers of Health     Financial Resource Strain: Low Risk  (10/23/2024)    Financial Resource Strain      Within the past 12 months, have you or your family members you live with been unable to get utilities (heat, electricity) when it was really needed?: No   Food Insecurity: Low Risk   (10/23/2024)    Food Insecurity      Within the past 12 months, did you worry that your food would run out before you got money to buy more?: No      Within the past 12 months, did the food you bought just not last and you didn t have money to get more?: No   Transportation Needs: Low Risk  (10/23/2024)    Transportation Needs      Within the past 12 months, has lack of transportation kept you from medical appointments, getting your medicines, non-medical meetings or appointments, work, or from getting things that you need?: No   Physical Activity: Inactive (11/16/2023)    Exercise Vital Sign      Days of Exercise per Week: 0 days      Minutes of Exercise per Session: 0 min   Social Connections: Unknown (11/16/2023)    Social Connection and Isolation Panel [NHANES]      Marital Status:    Interpersonal Safety: Low Risk  (10/23/2024)    Interpersonal Safety      Do you feel physically and emotionally safe where you currently live?: Yes      Within the past 12 months, have you been hit, slapped, kicked or otherwise physically hurt by someone?: No      Within the past 12 months, have you been humiliated or emotionally abused in other ways by your partner or ex-partner?: No   Recent Concern: Interpersonal Safety - High Risk (10/17/2024)    Interpersonal Safety      Do you feel physically and emotionally safe where you currently live?: No      Within the past 12 months, have you been hit, slapped, kicked or otherwise physically hurt by someone?: No      Within the past 12 months, have you been humiliated or emotionally abused in other ways by your partner or ex-partner?: No   Housing Stability: Low Risk  (10/23/2024)    Housing Stability      Do you have housing? : Yes      Are you worried about losing your housing?: No   Recent Concern: Housing Stability - High Risk (9/1/2024)    Housing Stability      Do you have housing? : No      Are you worried about losing your housing?: No       MEDICATIONS:  Current  Outpatient Medications   Medication Sig Dispense Refill     acetaminophen (TYLENOL) 325 MG tablet Take 2 tablets (650 mg) by mouth every 4 hours as needed for other (For optimal non-opioid multimodal pain management to improve pain control.).       amiodarone (PACERONE) 200 MG tablet Take 1 tablet (200 mg) by mouth daily. 90 tablet 3     amLODIPine (NORVASC) 10 MG tablet Take 1 tablet (10 mg) by mouth daily. 90 tablet 3     atorvastatin (LIPITOR) 80 MG tablet Take 1 tablet (80 mg) by mouth every evening. 90 tablet 3     digoxin (LANOXIN) 62.5 MCG tablet Take 1 tablet (62.5 mcg) by mouth daily. 30 tablet 11     escitalopram (LEXAPRO) 10 MG tablet Take 1 tablet (10 mg) by mouth or Feeding Tube daily. 90 tablet 3     gabapentin (NEURONTIN) 100 MG capsule Take 1 capsule (100 mg) by mouth or Feeding Tube at bedtime. 90 capsule 3     hydrALAZINE (APRESOLINE) 25 MG tablet Take 1 tablet (25 mg) by mouth 3 times daily. 180 tablet 3     losartan (COZAAR) 50 MG tablet Take 1 tablet (50 mg) by mouth 2 times daily. 90 tablet 3     magnesium hydroxide (MILK OF MAGNESIA) 400 MG/5ML suspension Take 30 mLs by mouth daily as needed for constipation (Use if polyethylene glycol (Miralax) is not effective after 24 hours.). 354 mL 0     magnesium oxide (MAG-OX) 400 MG tablet Take 1 tablet (400 mg) by mouth daily. 90 tablet 3     melatonin 10 MG TABS tablet Take 1 tablet (10 mg) by mouth every evening.       multivitamin w/minerals (THERA-VIT-M) tablet Take 1 tablet by mouth daily. 90 tablet 3     warfarin ANTICOAGULANT (COUMADIN) 2.5 MG tablet Take 1 tablet (2.5 mg) by mouth every evening. 90 tablet 3     No current facility-administered medications for this visit.        ALLERGIES:     Allergies   Allergen Reactions     Brilinta [Ticagrelor] Other (See Comments) and Difficulty breathing     Per pt and spouse, hyperventilation.     Clonazepam Other (See Comments)     Per spouse, acted like a zombie and he was shaky, could barely talk.        PHYSICAL EXAM:  Wt 64.2 kg (141 lb 8 oz)   SpO2 96%   BMI 22.16 kg/m      Gen: alert, interactive, NAD  Neck: supple, no JVD  CV: VAD hum+  Chest: CTAB, no wheezes or crackles  Abd: soft, NT, ND, +BS  Ext: no LE edema    LABS:    CMP  Last Comprehensive Metabolic Panel:  Sodium   Date Value Ref Range Status   11/13/2024 128 (L) 135 - 145 mmol/L Final   05/26/2021 141 133 - 144 mmol/L Final     Sodium Whole Blood   Date Value Ref Range Status   10/27/2023 138 135 - 145 mmol/L Final     Potassium   Date Value Ref Range Status   11/13/2024 4.3 3.4 - 5.3 mmol/L Final   05/26/2021 4.5 3.4 - 5.3 mmol/L Final     Potassium Whole Blood   Date Value Ref Range Status   09/23/2024 2.5 (LL) 3.4 - 5.3 mmol/L Final     Potassium POCT   Date Value Ref Range Status   10/23/2024 3.8 3.4 - 5.3 mmol/L Final     Chloride   Date Value Ref Range Status   11/13/2024 94 (L) 98 - 107 mmol/L Final   05/26/2021 105 94 - 109 mmol/L Final     Chloride Whole Blood   Date Value Ref Range Status   10/27/2023 103 94 - 109 mmol/L Final     Carbon Dioxide   Date Value Ref Range Status   05/26/2021 29 20 - 32 mmol/L Final     Carbon Dioxide (CO2)   Date Value Ref Range Status   11/13/2024 25 22 - 29 mmol/L Final     Carbon Dioxide Whole Blood   Date Value Ref Range Status   10/27/2023 27 20 - 32 mmol/L Final     Anion Gap   Date Value Ref Range Status   11/13/2024 9 7 - 15 mmol/L Final   05/26/2021 7 3 - 14 mmol/L Final     Glucose   Date Value Ref Range Status   11/13/2024 86 70 - 99 mg/dL Final   05/26/2021 125 (H) 70 - 99 mg/dL Final     GLUCOSE BY METER POCT   Date Value Ref Range Status   11/09/2024 111 (H) 70 - 99 mg/dL Final     Urea Nitrogen   Date Value Ref Range Status   11/13/2024 19.2 8.0 - 23.0 mg/dL Final   05/26/2021 41 (H) 7 - 30 mg/dL Final     Creatinine   Date Value Ref Range Status   11/13/2024 0.63 (L) 0.67 - 1.17 mg/dL Final   05/26/2021 0.80 0.66 - 1.25 mg/dL Final     GFR Estimate   Date Value Ref Range Status  "  11/13/2024 >90 >60 mL/min/1.73m2 Final     Comment:     eGFR calculated using 2021 CKD-EPI equation.   05/26/2021 90 >60 mL/min/[1.73_m2] Final     Comment:     Non  GFR Calc  Starting 12/18/2018, serum creatinine based estimated GFR (eGFR) will be   calculated using the Chronic Kidney Disease Epidemiology Collaboration   (CKD-EPI) equation.       GFR, ESTIMATED POCT   Date Value Ref Range Status   10/23/2024 >60 >60 mL/min/1.73m2 Final     Calcium   Date Value Ref Range Status   11/13/2024 9.1 8.8 - 10.4 mg/dL Final     Comment:     Reference intervals for this test were updated on 7/16/2024 to reflect our healthy population more accurately. There may be differences in the flagging of prior results with similar values performed with this method. Those prior results can be interpreted in the context of the updated reference intervals.   05/26/2021 8.3 (L) 8.5 - 10.1 mg/dL Final     Bilirubin Total   Date Value Ref Range Status   11/13/2024 0.4 <=1.2 mg/dL Final   03/20/2018 0.5 0.2 - 1.3 mg/dL Final     Alkaline Phosphatase   Date Value Ref Range Status   11/13/2024 117 40 - 150 U/L Final   03/20/2018 82 40 - 150 U/L Final     ALT   Date Value Ref Range Status   11/13/2024 64 0 - 70 U/L Final   03/20/2018 22 0 - 70 U/L Final     AST   Date Value Ref Range Status   11/13/2024 50 (H) 0 - 45 U/L Final   03/20/2018 20 0 - 45 U/L Final       NT-proBNP       LDH      TROP  No results found for: \"TROPI\", \"TROPONIN\", \"TROPR\", \"TROPN\"    CBC  CBC RESULTS:   Recent Labs   Lab Test 11/13/24  0910   WBC 5.4   RBC 3.49*   HGB 10.6*   HCT 31.3*   MCV 90   MCH 30.4   MCHC 33.9   RDW 15.1*          LIPIDS  Recent Labs   Lab Test 09/04/24  0626 08/31/24  0414   CHOL 85 83   HDL 29* 25*   LDL 45 46   TRIG 56 59       TSH  TSH   Date Value Ref Range Status   09/04/2024 2.45 0.30 - 4.20 uIU/mL Final       HBA1C  Lab Results   Component Value Date    A1C 5.4 09/18/2024    A1C 4.8 03/13/2023         CARDIAC " DATA:    EKG:  10/23/24: Atrial fibrillation, Left anterior fascicular block, Possible Lateral infarct , age undetermined     Echo:  10/23/24  LVAD cannula was seen in the expected anatomic position in the LV apex.  HM3 at 5100RPM.  LVIDd 47mm.  Septum normal.  Aortic valve open intermittently. No AI.  Normal flow velocities.  Global right ventricular function is mildly to moderately reduced.  Small circumferential pericardial effusion is present without any hemodynamic significance.    Cardiac Catheterization:  RHC 10/23/24  BSA 1.75 m2  /89/101 mmHg  RA --/--/6 mmHg  RV 30/3mmHg  PA 30/14/20 mmHg  PCWP --/--/9 mmHg  TD CO/CI 4.1/2.34   Goldy CO/CI 4.54/2.59   PVR 2.68 (TD)  PA sat 65%   PCW Oximeter sat 95%  Hgb 10.5 g/dL Right sided filling pressures are normal. Left sided filling pressures are normal. Normal PA pressures. Normal cardiac output level.       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 NYHA Class III.     10/23/24 RA 6, PA 40/14/20, PCW 9, Goldy CO/CI 4.54/2.59 at 5100 rpm     Fluid status: likely euvolemic, has potential to be hypovolemic, and is now on an aggressive oral hydration regimen  ACEi/ARB/ARNi: losartan 50 BID  Vasodilator: hydralazine 25 mg q8h.  amlodipine 10 mg daily  BB: deferred due to recent VAD  Aldosterone antagonist: STOPPED blane due to hypoNa  SGLT2i: STOPPED empagliflozin 10 mg d/t propensity for hypovolemia    SCD prophylaxis: ICD  MAP: goal 70-90, encouraging POs ulitize hold parameters  LDH trends: stable 200s  Anticoagulation: warfarin dosing per pharmacy, INR goal 2-3, INR 2.4  Antiplatelet: ASA not indicated in LVAD population, > 6 months s/p STEMI    Other:   - No fluid restriction, encourage drinks with  Na/electrolytes, salty snacks  - Patient has a low flow controller, will not alarm until flow <2. Hematocrit is set to match his true hematocrit  - Dr. Smith has reviewed inflow and outflow positioning, nothing to do    Left ventricular assist device interrogation: The patient's HeartMate  III LVAD was interrogated today 24 .     I have personally interrogated the LVAD, reviewing all parameters and alarm trends as noted in the note.     Patient is euvolemic     Exam otherwise as noted in the note    Heartmate 3 LEFT VS  Flow (Lpm): 3.6 Lpm (3-4)  Pulse Index (PI): 3.7 PI (2-9)  Speed (rpm): 5000 rpm  Power (jackson): 3.3 jackson  Current Hct settin.3     Alarms were reviewed, and notable for none.  The driveline exit site with dressing in place, and showed no concerns.  All external components were inspected and showed no evidence of damage or malfunction.  No components were replaced.  No changes to VAD settings made       # Low flow alarms, resolved  # Elevated PIs  # Labile MAPs   # Suspected orthostatic hypotension, resolved  Speed optimization echo performed 10/1, speed decreased to 5100. Low flows seems releated to hypertension and some hypovolemia as well. Have improved with re-timing hydralazine and after IV fluids. CT-A was done on  10/17/24 with no outflow graft ostruction. The outflow graft does make an acute angle as it approaches the aorta, discussed with Dr. Valentine, unlikely to be impacting the low flows at this time.  Having daily low flow alarms, always in the setting of htn. Mostly ~5 am, although has had some in the evening as well. They do not seem to be positional. RHC with reasonable filling pressures as above. He has been asymptomatic.  - Decreased speed to 5000 on 10/27  - Losartan 50 mg BID  - Hydralazine and amlodipine as above  - Stopped fludrocortisone 0.1 mg daily   - Increase PO intake as above, specifically right before bed, non free-water sources due to hypoNa  - Temp drop in  "hematocrit on monitor 10/29-10/30, low-flow controller change out 10/30, changed hematocrit back 10/31     # History of VT/VF arrest 2023  # AFib s/p DCCV 9/2024 and again 9/30/2024   Successfully cardioverted in early September for AF. Since then EKG suggested AF on 9/21. Tele is only available from as early as 9/22. The patient was started on hep gtt 9/21, but unclear if was in AF before this. While the patient was on hep gtt until INR was therapeutic, it is not possible to assess rhythm prior to 9/21. Systemic AC was off on 9/18-9/20. S/p MELBA and DCCV on 9/30 with conversion to sinus rhythm.  10/23 device interrogation shows persistent episode of AF since 10/10 with rates   - Continue amiodarone 200 mg daily  - S/p digoxin load, continue digoxin 62.5 mcg daily   - Ongoing a fib- given ongoing low flows- deferring cardioversion for now given reasonable rate control.   - AC as above     # Hypovolemia hyponatremia, stable  Na 127 at discharge, stable today   - encourage high sodium PO intake/gatorade and salty foods     # Visual changes, resolved.   # Generalized weakness, improved   # Headache, resolved  Stoke code called 9/29 for visual changes - right eye with decreased upper half of vision and bluish haze. Resolved after 30 minutes. Seen by opthalmology and neuro. Negative head CT and CTA head and neck. He has had 3 episodes which last about 5 minutes before resolving completely.      On 10/9 Stroke code activated due to concern of generalized weakness, numbness and headache. LKW reported as 0715 when he worked with therapies and shortly after started feeling generally weak. Then around 1500 he had onset of moderate \"tension\" type headache and tingling in the bilateral fingertips. Per RRT URIAH patient now reports tingling has resolved and headache is improving. MAP 48, INR 2.2. Given absence of focal deficits and rapidly improving symptoms, opted to cancel stroke.  CT head negative, though did show chronic " maxillary sinusitis.      Stroke code called 10/23 when seen in clinic with report of new RUE weakness. He was notably hypotensive at this time with MAP ~45 with new right pronator drift. Evaluated in ED, CT and CTA head and neck negative. RUE weakness resolved.   - monitor for changes- no current symptoms        # Cognitive changes  - Formal neuropsych testing this admission reveiling some cognitive changes  - 24/7 care during his first 30 days, will likely not need therafter, but continue to assess with each LVAD appointment (per Ho Méndez note from 11/8 Duane has passed his VAD education)  - Needs formal driving assessment with DMV or OT prior to being cleared to drive, wife aware and also discussed with patient at time of discharge  - Repeat neuropsych testing in 1 year  - Referred to behavioral neurology in place      I spent 46 minutes in care of the patient today including obtaining recent medical history, personally reviewing recent cardiac testing and/or lab results, today's examination, discussion of testing results and care recommendations with patient.       Thank you for the opportunity to participate in this patient's care. Please feel free to reach out with any questions or concerns.      Ham Cruz MD, [facility]  Associate Professor of Medicine  Advanced Heart Failure, LVAD & Cardiac Transplant  Director, Cardio-Obstetrics  Cardio-Oncology  [facility]      Please do not hesitate to contact me if you have any questions/concerns.     Sincerely,     Ham Cruz MD

## 2024-11-13 NOTE — PATIENT INSTRUCTIONS
"Recommendations from today's MTM visit:                                                    MTM (medication therapy management) is a service provided by a clinical pharmacist designed to help you get the most of out of your medicines.   Today we reviewed what your medicines are for, how to know if they are working, that your medicines are safe and how to make your medicine regimen as easy as possible.       digoxin from the pharmacy and start taking daily     Follow-up: 3 months     It was great speaking with you today.  I value your experience and would be very thankful for your time in providing feedback in our clinic survey. In the next few days, you may receive an email or text message from [employer] with a link to a survey related to your  clinical pharmacist.\"     To schedule another MTM appointment, please call the clinic directly or you may call the MTM scheduling line at [phone].    My Clinical Pharmacist's contact information:                                                      Please feel free to contact me with any questions or concerns you have.      Aranza Hameed, PharmD  Medication Therapy Management Pharmacist  [facility]    "

## 2024-11-13 NOTE — PROGRESS NOTES
Medication Therapy Management (MTM) Encounter    ASSESSMENT:                            Medication Adherence/Access: See below for considerations.    Constipation   Stable.      HFrEF (</=40%) s/p LVAD on 9/18/24    Further GDMT limited by fluid status and history of hypovolemia/dizziness (SGLT2i, MRA). Holding beta blocker given recent LVAD. Continue hydralazine, amlodipine, and losartan. Warfarin per [facility].      Afib  Appropriately on anticoagulation given [id number] score. Continue warfarin management through [facility]. Encouraged to  digoxin from pharmacy and start taking.     Amiodarone labs:   liver function checked today, slightly elevated AST but stable with previous labs - repeat in 6 months  Thyroid labs checked in 09/2024, within normal limits -  repeat in 6 months  PFTs 9/9/24    Hyperlipidemia, hx of STEMI s/p PCI 10/2023  LDL at goal.     Supplements   Stable.      anxiety:  Stable.     PLAN:                             digoxin from the pharmacy and start taking daily     Follow-up: 3 months     SUBJECTIVE/OBJECTIVE:                          Duane Johnson is a 74 year old male seen for an initial visit. He was referred to me from Dr. Cruz. Patient was accompanied by his wife.     Reason for visit: LVAD, LESTER.    Allergies/ADRs: Reviewed in chart  Past Medical History: Reviewed in chart  Tobacco: He reports that he quit smoking about 12 months ago. His smoking use included cigarettes. He started smoking about 31 years ago. He has a 7.5 pack-year smoking history. He has been exposed to tobacco smoke. He has never used smokeless tobacco.  Alcohol: none    Medication Adherence/Access: not started digoxin since leaving the hospital, were waiting PA approval. Wife sanjeev manages his medications for him. Reviewed med bottles at home. No missed doses reported.     Constipation   Magnesium hydroxide 30 mL daily as needed .     Has at home but has not needed to use.   Patient reports no current medication  side effects.       Heart Failure   HFrEF (</=40%) s/p LVAD on 9/18/24  ACEi/ARB/ARNI: losartan 50 mg twice daily   Amlodipine 10 mg daily   Hydralazine 25 mg three times daily   Warfarin 2.5 mg at bedtime -- managed by [facility]  Aldosterone antagonist: STOPPED blane due to hypoNa  SGLT2i: STOPPED empagliflozin 10 mg d/t propensity for hypovolemia    Patient reports no current medication side effects.     Patient reports these HF symptoms: not assess today, seen by Dr. Cruz in clinic earlier today.   Patient is  measuring daily weights every morning. 134.1 lbs this morning, stable. Home weight appears consistent with clinic weight.   Patient does not self-monitor blood pressure. Waiting to receive doppler.  Patient is not following a low sodium diet due to hypovolemia concerns and is avoiding alcohol.     Afib:  warfarin for stroke prevention - managed by [facility]  Digoxin 62.5 mcg daily -- not started yet. PA approved. Melissa says she will call the pharmacy today.      Patient reports no current medication side effects.    Patient does not have a history of GI bleed.   LPL5IQ5-NZAw Score    Date Calculated: 10/23/2024  8:43 AM  [id number] Score: 3       Hyperlipidemia, hx of STEMI s/p PCI 10/2023  atorvastatin 80 mg daily    Patient reports no current medication side effects.  Competed Plavix therapy. Not on aspirin.     Recent Labs   Lab Test 09/04/24  0626 08/31/24  0414   CHOL 85 83   HDL 29* 25*   LDL 45 46   TRIG 56 59       Supplements   Magnesium 400 mg daily   Melatonin 10 mg at bedtime   Multivitamin daily     No reported issues at this time.      anxiety:   Lexapro 10 mg daily     Today's Vitals: There were no vitals taken for this visit.  ----------------  Post Discharge Medication Reconciliation Status: discharge medications reconciled, continue medications without change.    I spent 15 minutes with this patient today. All changes were made via collaborative practice agreement with Ham Cruz. A copy  of the visit note was provided to the patient's provider(s).    A summary of these recommendations was sent via [employer].    Anusha JjD  Medication Therapy Management Pharmacist  [facility]    Telemedicine Visit Details  The patient's medications can be safely assessed via a telemedicine encounter.  Type of service:  Telephone visit  Originating Location (pt. Location): Home    Distant Location (provider location):  Off-site  Start Time:  3:00 PM  End Time:  3:15 PM     Medication Therapy Recommendations  No medication therapy recommendations to display

## 2024-11-13 NOTE — TELEPHONE ENCOUNTER
Prior Authorization Approval    Authorization Effective Date: 11/9/2024  Authorization Expiration Date: 11/9/2099  Medication: DIGOXIN 62.5MCG TABS  Approved Dose/Quantity:   Reference #:     Insurance Company: [employer] [phone] Fax [phone]  Expected CoPay:       CoPay Card Available:      Foundation Assistance Needed:    Which Pharmacy is filling the prescription (Not needed for infusion/clinic administered): [facility] [place] [id number] [place] [address]  Pharmacy Notified:  YES  Patient Notified:  yes- Pharmacy will contact patient when ready to /ship

## 2024-11-13 NOTE — PATIENT INSTRUCTIONS
" [facility]  Take your medications every day, as directed!  Medication changes made today:  No medicine changes today but please start the digoxin that was previously ordered. Instructions:  Please drink enough fluids to prevent LOW FLOW alarms.  Please limit your caffiene intake because caffiene is a diuretic and makes you urinate  Monitor your heart failure, Page the VAD Coordinator on call:  If you gain more than 3 lb in a day or 5 in a week  If you feel worsening shortness of breath, palpitations, or swelling.   If you have VAD alarms or change in parameters  If you feel dizzy or lightheaded     Keep your VAD appointments!    Your next appointments are:  Dr Mulvihill 11/18  Dr Cruz 11/26  Dr Cruz 12/2      Please see the clinic schedulers after your appointment to schedule follow-up.    If you do not have an appointment scheduled, you need to call the VAD  at [phone]. To Page the VAD Coordinator on call, dial [phone] option #[phone] and ask to speak to the VAD coordinator on call. Try to maintain a heart healthy lifestyle!  Limit salt & sodium to 2000mg/day   Eat a heart healthy diet, low in saturated fats  Stay active! Aim to move at least 150 minutes every week.    Use [employer] allows you to communicate directly with your heart team through secure messaging.  [employer] can be accessed any time on your phone, computer, or tablet.  If you need assistance, we'd be happy to help!      Equipment Reminders:   Charge your back-up controller at least every 6 months. To charge, connect it to either batteries or wall power. The screen will read \"Charging\". Keep connected until the screen reads \"charging complete\". Disconnect power once the controller battery is charged. Also do a self-test when the controller is connected to power.  Replace the AA batteries in your mobile power unit every 6 months.  Check your battery charger for when it is due for maintenance. It requires " inspection in clinic once per year. There will be a sticker stating the month and year maintenance is due. When you bring your battery charger to clinic, tell one of the schedulers you have LVAD equipment that needs maintenance. They will call [facility]. You can leave your  under the LVAD sign by the  at the far end of clinic. You must drop it off before 2pm.

## 2024-11-13 NOTE — PROGRESS NOTES
[facility]  Advanced Heart Failure Clinic    Patient Name: Duane C Johnson   : 1950   Date of Visit: 2024    Primary Care Physician: Jose Coles MD     Referring Physician: No ref. provider found   Reason for Visit: LVAD    Dear Dr. Sanket MD     I had the pleasure of seeing Duane C Johnson today in the [facility] Advanced Heart Failure Clinic. As you know Duane C Johnson is a pleasant 74 year old year old male, who presents today for further evaluation of LVAD.    Duane has a history of end-stage cardiomyopathy, history of coronary artery disease, previous STEMI, history of VT VF arrest, status post placement of ICD, history of A-fib a flutter with previous cardioversion, status post HeartMate 3 LVAD implantation 2024.    Duane was discharged to ARU on 10/5/24. There, he continued to have frequent low flow alarms with labile BPs and has required frequent adjustments to BP medications, initiation of florinef, as well as IVF and increased PO intake. Seen in clinic on 10/23 where he was presyncopal with MAPs in 40s. He was sent from clinic to ED for further evaluation. Admitted 10/23/24 for further workup of hypotension, hypovolemia, and low flows. Underwent stroke eval for neurological changes with negative CT. Symptoms improved following 2L IVF and improved MAPs. He was resumed on low doses of GDMT/afterload reduction. During this hospitalization continued to have several more runs of low flows. MAPs/meds optimized and prior to discharge he was without low flows/high PIs x 3 days. Controlled was also switched to low flow controlled. Discharge was delayed due to needing additional LVAD education. Consulted neuropsych due to concerns for cognitive changes - Alzheimer's disease or possibly mixed pathology (mild cognitive impairment).     Following discharge, he presented to the emergency room on 11/10/2024 with witnessed syncopal episode, feeling lightheaded which led to a  fall to the ground but he did not strike his head or suffer any injury.  He reported that he did not lose consciousness although EMS reported that family witnessed a brief loss of consciousness episode.  The ER spoke to Dr. Nicolas, who suggested aggressive hydration as he is known to have hypovolemia and orthostasis and he had not had much to eat or drink that day before this episode. He did not have a low flow alarm then.    Since then no further concerns. He felt minimal lightheadedness once on getting up but no recurrence. He is forcibly drinking at least 96 oz water/ gatorade, one 14 oz of Core 42 protein drink, mountain dew just one each day. He is mostly not using his walker, and reports he is using it if walking further than just going from couch to bathroom (about 20 feet) when he holds on to walls/doorways. His wife is with him 24x7, she works from home.    Starting cardiac rehab 11/18/24    Home BPs - yet to receive Doppler  Home weights - not checking    ROS:  A complete 10-point ROS was negative except as above.    PAST MEDICAL HISTORY:  Past Medical History:   Diagnosis Date    Benign essential hypertension     CAD (coronary artery disease)     Chronic systolic heart failure (H)     Hypertension     ST elevation myocardial infarction (STEMI), unspecified artery (H)     Ventricular fibrillation (H)        PAST SURGICAL HISTORY:  Past Surgical History:   Procedure Laterality Date    ANESTHESIA CARDIOVERSION N/A 9/5/2024    Procedure: Anesthesia cardioversion;  Surgeon: GENERIC ANESTHESIA PROVIDER;  Location: [facility]    ANESTHESIA CARDIOVERSION N/A 9/30/2024    Procedure: Anesthesia cardioversion;  Surgeon: GENERIC ANESTHESIA PROVIDER;  Location: [facility] OR    COLONOSCOPY N/A 3/23/2023    Procedure: COLONOSCOPY, FLEXIBLE, WITH LESION REMOVAL USING SNARE;  Surgeon: Jose Faustin MD;  Location: [facility]    CV CENTRAL VENOUS CATHETER PLACEMENT N/A 10/26/2023    Procedure: Central Venous Catheter Placement;   Surgeon: Rob Lyles MD;  Location:  HEART CARDIAC CATH LAB    CV CORONARY ANGIOGRAM N/A 10/27/2023    Procedure: Coronary Angiogram;  Surgeon: Rob Lyles MD;  Location:  HEART CARDIAC CATH LAB    CV CORONARY ANGIOGRAM N/A 10/26/2023    Procedure: Coronary Angiogram;  Surgeon: Rob Lyles MD;  Location:  HEART CARDIAC CATH LAB    CV LEFT HEART CATH N/A 10/26/2023    Procedure: Left Heart Catheterization;  Surgeon: Rob Lyles MD;  Location:  HEART CARDIAC CATH LAB    CV PCI N/A 10/27/2023    Procedure: Percutaneous Coronary Intervention;  Surgeon: Rob Lyles MD;  Location:  HEART CARDIAC CATH LAB    CV PCI STENT DRUG ELUTING N/A 10/26/2023    Procedure: Percutaneous Coronary Intervention Stent;  Surgeon: Rob Lyles MD;  Location:  HEART CARDIAC CATH LAB    CV RIGHT HEART CATH MEASUREMENTS RECORDED N/A 5/6/2024    Procedure: Heart Cath Right Heart Cath;  Surgeon: Rob Lyles MD;  Location:  HEART CARDIAC CATH LAB    CV RIGHT HEART CATH MEASUREMENTS RECORDED N/A 9/3/2024    Procedure: Right Heart Catheterization;  Surgeon: Aaron Mesa MD;  Location:  HEART CARDIAC CATH LAB    CV RIGHT HEART CATH MEASUREMENTS RECORDED N/A 10/24/2024    Procedure: Right Heart Catheterization;  Surgeon: Haile Braden MD;  Location: [facility] CARDIAC CATH LAB    EP ICD INSERT SINGLE N/A 5/8/2024    Procedure: Implantable Cardioverter Defibrillator Device & Lead Implant Dual;  Surgeon: Guero Black MD;  Location: [facility] CARDIAC CATH LAB    INJECTION, HYDROGEL SPACER N/A 4/22/2024    Procedure: INJECTION, HYDROGEL SPACER AND FIDUCIAL MARKER PLACEMENT;  Surgeon: Stanislaw Barros MD;  Location: [facility]    INSERT VENTRICULAR ASSIST DEVICE LEFT (HEARTMATE II) N/A 9/18/2024    Procedure: Median Sternotomy, INSERTION of LEFT VENTRICULAR ASSIST DEVICE (HEARTMATE III), cardiopulmonary bypass, transesophageal  echocardiogram performed by anesthesia.;  Surgeon: Mulvihill, Michael, MD;  Location: [facility] OR    IRRIGATION AND DEBRIDEMENT CHEST WASHOUT, COMBINED Right 2024    Procedure: Exploration of chest, chest washout;  Surgeon: Mulvihill, Michael, MD;  Location: [facility]       FAMILY HISTORY:  Family History   Problem Relation Age of Onset    Other Cancer Mother     Obesity Mother     GI problems Father     Uterine Cancer Sister        SOCIAL HISTORY:  Social History     Socioeconomic History    Marital status:      Spouse name: None    Number of children: None    Years of education: None    Highest education level: None   Tobacco Use    Smoking status: Former     Current packs/day: 0.00     Average packs/day: 0.3 packs/day for 30.0 years (7.5 ttl pk-yrs)     Types: Cigarettes     Start date: 10/26/1993     Quit date: 10/26/2023     Years since quittin.0     Passive exposure: Past    Smokeless tobacco: Never    Tobacco comments:     Quit 10/26/2023   Vaping Use    Vaping status: Never Used   Substance and Sexual Activity    Alcohol use: Yes     Comment: 1-2 beers per day    Drug use: No    Sexual activity: Yes     Partners: Female     Birth control/protection: None   Other Topics Concern    Parent/sibling w/ CABG, MI or angioplasty before 65F 55M? No     Social Drivers of Health     Financial Resource Strain: Low Risk  (10/23/2024)    Financial Resource Strain     Within the past 12 months, have you or your family members you live with been unable to get utilities (heat, electricity) when it was really needed?: No   Food Insecurity: Low Risk  (10/23/2024)    Food Insecurity     Within the past 12 months, did you worry that your food would run out before you got money to buy more?: No     Within the past 12 months, did the food you bought just not last and you didn t have money to get more?: No   Transportation Needs: Low Risk  (10/23/2024)    Transportation Needs     Within the past 12 months, has lack of  transportation kept you from medical appointments, getting your medicines, non-medical meetings or appointments, work, or from getting things that you need?: No   Physical Activity: Inactive (11/16/2023)    Exercise Vital Sign     Days of Exercise per Week: 0 days     Minutes of Exercise per Session: 0 min   Social Connections: Unknown (11/16/2023)    Social Connection and Isolation Panel [NHANES]     Marital Status:    Interpersonal Safety: Low Risk  (10/23/2024)    Interpersonal Safety     Do you feel physically and emotionally safe where you currently live?: Yes     Within the past 12 months, have you been hit, slapped, kicked or otherwise physically hurt by someone?: No     Within the past 12 months, have you been humiliated or emotionally abused in other ways by your partner or ex-partner?: No   Recent Concern: Interpersonal Safety - High Risk (10/17/2024)    Interpersonal Safety     Do you feel physically and emotionally safe where you currently live?: No     Within the past 12 months, have you been hit, slapped, kicked or otherwise physically hurt by someone?: No     Within the past 12 months, have you been humiliated or emotionally abused in other ways by your partner or ex-partner?: No   Housing Stability: Low Risk  (10/23/2024)    Housing Stability     Do you have housing? : Yes     Are you worried about losing your housing?: No   Recent Concern: Housing Stability - High Risk (9/1/2024)    Housing Stability     Do you have housing? : No     Are you worried about losing your housing?: No       MEDICATIONS:  Current Outpatient Medications   Medication Sig Dispense Refill    acetaminophen (TYLENOL) 325 MG tablet Take 2 tablets (650 mg) by mouth every 4 hours as needed for other (For optimal non-opioid multimodal pain management to improve pain control.).      amiodarone (PACERONE) 200 MG tablet Take 1 tablet (200 mg) by mouth daily. 90 tablet 3    amLODIPine (NORVASC) 10 MG tablet Take 1 tablet (10 mg)  by mouth daily. 90 tablet 3    atorvastatin (LIPITOR) 80 MG tablet Take 1 tablet (80 mg) by mouth every evening. 90 tablet 3    digoxin (LANOXIN) 62.5 MCG tablet Take 1 tablet (62.5 mcg) by mouth daily. 30 tablet 11    escitalopram (LEXAPRO) 10 MG tablet Take 1 tablet (10 mg) by mouth or Feeding Tube daily. 90 tablet 3    gabapentin (NEURONTIN) 100 MG capsule Take 1 capsule (100 mg) by mouth or Feeding Tube at bedtime. 90 capsule 3    hydrALAZINE (APRESOLINE) 25 MG tablet Take 1 tablet (25 mg) by mouth 3 times daily. 180 tablet 3    losartan (COZAAR) 50 MG tablet Take 1 tablet (50 mg) by mouth 2 times daily. 90 tablet 3    magnesium hydroxide (MILK OF MAGNESIA) 400 MG/5ML suspension Take 30 mLs by mouth daily as needed for constipation (Use if polyethylene glycol (Miralax) is not effective after 24 hours.). 354 mL 0    magnesium oxide (MAG-OX) 400 MG tablet Take 1 tablet (400 mg) by mouth daily. 90 tablet 3    melatonin 10 MG TABS tablet Take 1 tablet (10 mg) by mouth every evening.      multivitamin w/minerals (THERA-VIT-M) tablet Take 1 tablet by mouth daily. 90 tablet 3    warfarin ANTICOAGULANT (COUMADIN) 2.5 MG tablet Take 1 tablet (2.5 mg) by mouth every evening. 90 tablet 3     No current facility-administered medications for this visit.        ALLERGIES:     Allergies   Allergen Reactions    Brilinta [Ticagrelor] Other (See Comments) and Difficulty breathing     Per pt and spouse, hyperventilation.    Clonazepam Other (See Comments)     Per spouse, acted like a zombie and he was shaky, could barely talk.       PHYSICAL EXAM:  Wt 64.2 kg (141 lb 8 oz)   SpO2 96%   BMI 22.16 kg/m      Gen: alert, interactive, NAD  Neck: supple, no JVD  CV: VAD hum+  Chest: CTAB, no wheezes or crackles  Abd: soft, NT, ND, +BS  Ext: no LE edema    LABS:    CMP  Last Comprehensive Metabolic Panel:  Sodium   Date Value Ref Range Status   11/13/2024 128 (L) 135 - 145 mmol/L Final   05/26/2021 141 133 - 144 mmol/L Final     Sodium  Whole Blood   Date Value Ref Range Status   10/27/2023 138 135 - 145 mmol/L Final     Potassium   Date Value Ref Range Status   11/13/2024 4.3 3.4 - 5.3 mmol/L Final   05/26/2021 4.5 3.4 - 5.3 mmol/L Final     Potassium Whole Blood   Date Value Ref Range Status   09/23/2024 2.5 (LL) 3.4 - 5.3 mmol/L Final     Potassium POCT   Date Value Ref Range Status   10/23/2024 3.8 3.4 - 5.3 mmol/L Final     Chloride   Date Value Ref Range Status   11/13/2024 94 (L) 98 - 107 mmol/L Final   05/26/2021 105 94 - 109 mmol/L Final     Chloride Whole Blood   Date Value Ref Range Status   10/27/2023 103 94 - 109 mmol/L Final     Carbon Dioxide   Date Value Ref Range Status   05/26/2021 29 20 - 32 mmol/L Final     Carbon Dioxide (CO2)   Date Value Ref Range Status   11/13/2024 25 22 - 29 mmol/L Final     Carbon Dioxide Whole Blood   Date Value Ref Range Status   10/27/2023 27 20 - 32 mmol/L Final     Anion Gap   Date Value Ref Range Status   11/13/2024 9 7 - 15 mmol/L Final   05/26/2021 7 3 - 14 mmol/L Final     Glucose   Date Value Ref Range Status   11/13/2024 86 70 - 99 mg/dL Final   05/26/2021 125 (H) 70 - 99 mg/dL Final     GLUCOSE BY METER POCT   Date Value Ref Range Status   11/09/2024 111 (H) 70 - 99 mg/dL Final     Urea Nitrogen   Date Value Ref Range Status   11/13/2024 19.2 8.0 - 23.0 mg/dL Final   05/26/2021 41 (H) 7 - 30 mg/dL Final     Creatinine   Date Value Ref Range Status   11/13/2024 0.63 (L) 0.67 - 1.17 mg/dL Final   05/26/2021 0.80 0.66 - 1.25 mg/dL Final     GFR Estimate   Date Value Ref Range Status   11/13/2024 >90 >60 mL/min/1.73m2 Final     Comment:     eGFR calculated using 2021 CKD-EPI equation.   05/26/2021 90 >60 mL/min/[1.73_m2] Final     Comment:     Non  GFR Calc  Starting 12/18/2018, serum creatinine based estimated GFR (eGFR) will be   calculated using the Chronic Kidney Disease Epidemiology Collaboration   (CKD-EPI) equation.       GFR, ESTIMATED POCT   Date Value Ref Range Status  "  10/23/2024 >60 >60 mL/min/1.73m2 Final     Calcium   Date Value Ref Range Status   11/13/2024 9.1 8.8 - 10.4 mg/dL Final     Comment:     Reference intervals for this test were updated on 7/16/2024 to reflect our healthy population more accurately. There may be differences in the flagging of prior results with similar values performed with this method. Those prior results can be interpreted in the context of the updated reference intervals.   05/26/2021 8.3 (L) 8.5 - 10.1 mg/dL Final     Bilirubin Total   Date Value Ref Range Status   11/13/2024 0.4 <=1.2 mg/dL Final   03/20/2018 0.5 0.2 - 1.3 mg/dL Final     Alkaline Phosphatase   Date Value Ref Range Status   11/13/2024 117 40 - 150 U/L Final   03/20/2018 82 40 - 150 U/L Final     ALT   Date Value Ref Range Status   11/13/2024 64 0 - 70 U/L Final   03/20/2018 22 0 - 70 U/L Final     AST   Date Value Ref Range Status   11/13/2024 50 (H) 0 - 45 U/L Final   03/20/2018 20 0 - 45 U/L Final       NT-proBNP       LDH      TROP  No results found for: \"TROPI\", \"TROPONIN\", \"TROPR\", \"TROPN\"    CBC  CBC RESULTS:   Recent Labs   Lab Test 11/13/24  0910   WBC 5.4   RBC 3.49*   HGB 10.6*   HCT 31.3*   MCV 90   MCH 30.4   MCHC 33.9   RDW 15.1*          LIPIDS  Recent Labs   Lab Test 09/04/24  0626 08/31/24  0414   CHOL 85 83   HDL 29* 25*   LDL 45 46   TRIG 56 59       TSH  TSH   Date Value Ref Range Status   09/04/2024 2.45 0.30 - 4.20 uIU/mL Final       HBA1C  Lab Results   Component Value Date    A1C 5.4 09/18/2024    A1C 4.8 03/13/2023         CARDIAC DATA:    EKG:  10/23/24: Atrial fibrillation, Left anterior fascicular block, Possible Lateral infarct , age undetermined     Echo:  10/23/24  LVAD cannula was seen in the expected anatomic position in the LV apex.  HM3 at 5100RPM.  LVIDd 47mm.  Septum normal.  Aortic valve open intermittently. No AI.  Normal flow velocities.  Global right ventricular function is mildly to moderately reduced.  Small circumferential " pericardial effusion is present without any hemodynamic significance.    Cardiac Catheterization:  RHC 10/23/24  BSA 1.75 m2  /89/101 mmHg  RA --/--/6 mmHg  RV 30/3mmHg  PA 30/14/20 mmHg  PCWP --/--/9 mmHg  TD CO/CI 4.1/2.34   Goldy CO/CI 4.54/2.59   PVR 2.68 (TD)  PA sat 65%   PCW Oximeter sat 95%  Hgb 10.5 g/dL Right sided filling pressures are normal. Left sided filling pressures are normal. Normal PA pressures. Normal cardiac output level.       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 NYHA Class III.     10/23/24 RA 6, PA 40/14/20, PCW 9, Goldy CO/CI 4.54/2.59 at 5100 rpm     Fluid status: likely euvolemic, has potential to be hypovolemic, and is now on an aggressive oral hydration regimen  ACEi/ARB/ARNi: losartan 50 BID  Vasodilator: hydralazine 25 mg q8h.  amlodipine 10 mg daily  BB: deferred due to recent VAD  Aldosterone antagonist: STOPPED blane due to hypoNa  SGLT2i: STOPPED empagliflozin 10 mg d/t propensity for hypovolemia    SCD prophylaxis: ICD  MAP: goal 70-90, encouraging POs ulitize hold parameters  LDH trends: stable 200s  Anticoagulation: warfarin dosing per pharmacy, INR goal 2-3, INR 2.4  Antiplatelet: ASA not indicated in LVAD population, > 6 months s/p STEMI    Other:   - No fluid restriction, encourage drinks with Na/electrolytes, salty snacks  - Patient has a low flow controller, will not alarm until flow <2. Hematocrit is set to match his true hematocrit  - Dr. Smith has reviewed inflow and outflow positioning, nothing to do    Left ventricular assist device interrogation: The patient's HeartMate  III LVAD was interrogated today 11/13/24 .     I have personally interrogated the LVAD, reviewing all parameters and alarm  trends as noted in the note.     Patient is euvolemic     Exam otherwise as noted in the note    Heartmate 3 LEFT VS  Flow (Lpm): 3.6 Lpm (3-4)  Pulse Index (PI): 3.7 PI (2-9)  Speed (rpm): 5000 rpm  Power (jackson): 3.3 jackson  Current Hct settin.3     Alarms were reviewed, and notable for none.  The driveline exit site with dressing in place, and showed no concerns.  All external components were inspected and showed no evidence of damage or malfunction.  No components were replaced.  No changes to VAD settings made       # Low flow alarms, resolved  # Elevated PIs  # Labile MAPs   # Suspected orthostatic hypotension, resolved  Speed optimization echo performed 10/1, speed decreased to 5100. Low flows seems releated to hypertension and some hypovolemia as well. Have improved with re-timing hydralazine and after IV fluids. CT-A was done on  10/17/24 with no outflow graft ostruction. The outflow graft does make an acute angle as it approaches the aorta, discussed with Dr. Valentine, unlikely to be impacting the low flows at this time.  Having daily low flow alarms, always in the setting of htn. Mostly ~5 am, although has had some in the evening as well. They do not seem to be positional. RHC with reasonable filling pressures as above. He has been asymptomatic.  - Decreased speed to 5000 on 10/27  - Losartan 50 mg BID  - Hydralazine and amlodipine as above  - Stopped fludrocortisone 0.1 mg daily   - Increase PO intake as above, specifically right before bed, non free-water sources due to hypoNa  - Temp drop in hematocrit on monitor 10/29-10/30, low-flow controller change out 10/30, changed hematocrit back 10/31     # History of VT/VF arrest   # AFib s/p DCCV 2024 and again 2024   Successfully cardioverted in early September for AF. Since then EKG suggested AF on . Tele is only available from as early as . The patient was started on hep gtt , but unclear if was in AF before this. While the patient  "was on hep gtt until INR was therapeutic, it is not possible to assess rhythm prior to 9/21. Systemic AC was off on 9/18-9/20. S/p MELBA and DCCV on 9/30 with conversion to sinus rhythm.  10/23 device interrogation shows persistent episode of AF since 10/10 with rates   - Continue amiodarone 200 mg daily  - S/p digoxin load, continue digoxin 62.5 mcg daily   - Ongoing a fib- given ongoing low flows- deferring cardioversion for now given reasonable rate control.   - AC as above     # Hypovolemia hyponatremia, stable  Na 127 at discharge, stable today   - encourage high sodium PO intake/gatorade and salty foods     # Visual changes, resolved.   # Generalized weakness, improved   # Headache, resolved  Stoke code called 9/29 for visual changes - right eye with decreased upper half of vision and bluish haze. Resolved after 30 minutes. Seen by opthalmology and neuro. Negative head CT and CTA head and neck. He has had 3 episodes which last about 5 minutes before resolving completely.      On 10/9 Stroke code activated due to concern of generalized weakness, numbness and headache. LKW reported as 0715 when he worked with therapies and shortly after started feeling generally weak. Then around 1500 he had onset of moderate \"tension\" type headache and tingling in the bilateral fingertips. Per RRT URIAH patient now reports tingling has resolved and headache is improving. MAP 48, INR 2.2. Given absence of focal deficits and rapidly improving symptoms, opted to cancel stroke.  CT head negative, though did show chronic maxillary sinusitis.      Stroke code called 10/23 when seen in clinic with report of new RUE weakness. He was notably hypotensive at this time with MAP ~45 with new right pronator drift. Evaluated in ED, CT and CTA head and neck negative. RUE weakness resolved.   - monitor for changes- no current symptoms        # Cognitive changes  - Formal neuropsych testing this admission reveiling some cognitive changes  - " 24/7 care during his first 30 days, will likely not need therafter, but continue to assess with each LVAD appointment (per Ho Méndez note from 11/8 Duane has passed his VAD education)  - Needs formal driving assessment with DMV or OT prior to being cleared to drive, wife aware and also discussed with patient at time of discharge  - Repeat neuropsych testing in 1 year  - Referred to behavioral neurology in place      I spent 46 minutes in care of the patient today including obtaining recent medical history, personally reviewing recent cardiac testing and/or lab results, today's examination, discussion of testing results and care recommendations with patient.       Thank you for the opportunity to participate in this patient's care. Please feel free to reach out with any questions or concerns.      Ham Cruz MD, [facility]  Associate Professor of Medicine  Advanced Heart Failure, LVAD & Cardiac Transplant  Director, Cardio-Obstetrics  Cardio-Oncology  [facility]

## 2024-11-13 NOTE — NURSING NOTE
MCS VAD Pump Info       Row Name 11/13/24 1104             MCS VAD Information    Implant LVAD      LVAD Pump --         Heartmate 3 LEFT VS    Flow (Lpm) 3.6 Lpm  3-4      Pulse Index (PI) 3.7 PI  2-9      Speed (rpm) 5000 rpm      Power (jackson) 3.3 jackson      Current Hct setting 31.3         Primary Controller    Serial number [id number]      Low flow alarm setting 2.0      High watt alarm setting na      EBB: Patient use 11      Replace in 31 Months         Backup Controller    Serial number [id number]      EBB: Patient use 8      Replace EBB in 31 Months      Speed & HCT match primary controller Y         VAD Interrogation    Alarms reported by patient Y      Unexpected alarms noted upon interrogation None      PI events --      Damage to equipment is noted N      Action taken Reviewed proper equipment care and maintenance         Driveline Exit Site    Dressing change done Y      Driveline properly secured Yes      DLES assessment c/d/i      Dressing used Daily kit      Frequency patient changes dressing Daily      Dressing modifications --      Dressing change supplier --                      Education Complete: Yes   Charge the BACKUP controller s backup battery every 6 months  Perform a self test on BACKUP every 6 months  Change the MPU s batteries every 6 months:Yes  Have equipment serviced yearly (if applicable):Yes    Reviewed Heartmate battery maintenance. Hand out given to patient regarding weekly, monthly, and yearly maintenance.

## 2024-11-14 ENCOUNTER — PATIENT OUTREACH (OUTPATIENT)
Dept: CARE COORDINATION | Facility: CLINIC | Age: 74
End: 2024-11-14
Payer: MEDICARE

## 2024-11-14 NOTE — PROGRESS NOTES
[facility] Medicare ACO Reach Population - Proactive Outreach    Background: Patient outreach conducted proactively to support health maintenance initiatives and identify any opportunities to integrated Care Coordination assistance in patient-centered goals.      Patient agreeable to scheduling Hospital/ED follow up? Yes   If Yes:   Scheduling via phone: CC team member completed warm transfer to scheduling team     Scheduling via Orgoohart: Patient preferred to schedule using Orgoohart and voiced plan to complete this soon.  If No, CC team member encouraged patient to contact [facility] at [phone] to schedule an appointment in the future, or schedule through Orgoohart.    Patient accepts CC: No, Patient declines and states he is self sufficient and does not feel he needs this service at this time.    Kadie Lester RN  [facility]

## 2024-11-15 DIAGNOSIS — Z95.811 LVAD (LEFT VENTRICULAR ASSIST DEVICE) PRESENT (H): ICD-10-CM

## 2024-11-15 DIAGNOSIS — Z79.01 ANTICOAGULATED ON WARFARIN: ICD-10-CM

## 2024-11-15 DIAGNOSIS — I50.22 CHRONIC SYSTOLIC CONGESTIVE HEART FAILURE (H): Primary | ICD-10-CM

## 2024-11-15 LAB — STFR SERPL-MCNC: 4.4 MG/L

## 2024-11-18 ENCOUNTER — ALLIED HEALTH/NURSE VISIT (OUTPATIENT)
Dept: CARDIOLOGY | Facility: CLINIC | Age: 74
End: 2024-11-18
Attending: SURGERY
Payer: MEDICARE

## 2024-11-18 ENCOUNTER — LAB (OUTPATIENT)
Dept: LAB | Facility: CLINIC | Age: 74
End: 2024-11-18
Payer: MEDICARE

## 2024-11-18 ENCOUNTER — ANTICOAGULATION THERAPY VISIT (OUTPATIENT)
Dept: ANTICOAGULATION | Facility: CLINIC | Age: 74
End: 2024-11-18

## 2024-11-18 VITALS
HEART RATE: 53 BPM | BODY MASS INDEX: 22.15 KG/M2 | SYSTOLIC BLOOD PRESSURE: 60 MMHG | OXYGEN SATURATION: 94 % | WEIGHT: 141.4 LBS

## 2024-11-18 DIAGNOSIS — Z95.811 LVAD (LEFT VENTRICULAR ASSIST DEVICE) PRESENT (H): ICD-10-CM

## 2024-11-18 DIAGNOSIS — I95.89 OTHER SPECIFIED HYPOTENSION: ICD-10-CM

## 2024-11-18 DIAGNOSIS — Z95.811 LVAD (LEFT VENTRICULAR ASSIST DEVICE) PRESENT (H): Primary | ICD-10-CM

## 2024-11-18 DIAGNOSIS — I50.22 CHRONIC SYSTOLIC CONGESTIVE HEART FAILURE (H): ICD-10-CM

## 2024-11-18 DIAGNOSIS — I50.22 CHRONIC SYSTOLIC CONGESTIVE HEART FAILURE (H): Primary | ICD-10-CM

## 2024-11-18 DIAGNOSIS — Z79.01 ANTICOAGULATED ON WARFARIN: ICD-10-CM

## 2024-11-18 LAB
ALBUMIN SERPL BCG-MCNC: 3.8 G/DL (ref 3.5–5.2)
ALP SERPL-CCNC: 105 U/L (ref 40–150)
ALT SERPL W P-5'-P-CCNC: 60 U/L (ref 0–70)
ANION GAP SERPL CALCULATED.3IONS-SCNC: 9 MMOL/L (ref 7–15)
AST SERPL W P-5'-P-CCNC: 43 U/L (ref 0–45)
BILIRUB SERPL-MCNC: 0.4 MG/DL
BUN SERPL-MCNC: 21.5 MG/DL (ref 8–23)
CALCIUM SERPL-MCNC: 9 MG/DL (ref 8.8–10.4)
CHLORIDE SERPL-SCNC: 95 MMOL/L (ref 98–107)
CREAT SERPL-MCNC: 0.78 MG/DL (ref 0.67–1.17)
EGFRCR SERPLBLD CKD-EPI 2021: >90 ML/MIN/1.73M2
ERYTHROCYTE [DISTWIDTH] IN BLOOD BY AUTOMATED COUNT: 14.8 % (ref 10–15)
GLUCOSE SERPL-MCNC: 88 MG/DL (ref 70–99)
HCO3 SERPL-SCNC: 25 MMOL/L (ref 22–29)
HCT VFR BLD AUTO: 33.6 % (ref 40–53)
HGB BLD-MCNC: 11.1 G/DL (ref 13.3–17.7)
INR PPP: 2.93 (ref 0.85–1.15)
LDH SERPL L TO P-CCNC: 199 U/L (ref 0–250)
MCH RBC QN AUTO: 30 PG (ref 26.5–33)
MCHC RBC AUTO-ENTMCNC: 33 G/DL (ref 31.5–36.5)
MCV RBC AUTO: 91 FL (ref 78–100)
NT-PROBNP SERPL-MCNC: 2151 PG/ML (ref 0–900)
PLATELET # BLD AUTO: 158 10E3/UL (ref 150–450)
POTASSIUM SERPL-SCNC: 4.1 MMOL/L (ref 3.4–5.3)
PROT SERPL-MCNC: 7.2 G/DL (ref 6.4–8.3)
RBC # BLD AUTO: 3.7 10E6/UL (ref 4.4–5.9)
SODIUM SERPL-SCNC: 129 MMOL/L (ref 135–145)
WBC # BLD AUTO: 4.7 10E3/UL (ref 4–11)

## 2024-11-18 PROCEDURE — 83615 LACTATE (LD) (LDH) ENZYME: CPT | Performed by: PATHOLOGY

## 2024-11-18 PROCEDURE — 85610 PROTHROMBIN TIME: CPT | Performed by: PATHOLOGY

## 2024-11-18 PROCEDURE — 36415 COLL VENOUS BLD VENIPUNCTURE: CPT | Performed by: PATHOLOGY

## 2024-11-18 PROCEDURE — 80053 COMPREHEN METABOLIC PANEL: CPT | Performed by: PATHOLOGY

## 2024-11-18 PROCEDURE — 83880 ASSAY OF NATRIURETIC PEPTIDE: CPT | Performed by: PATHOLOGY

## 2024-11-18 PROCEDURE — 85027 COMPLETE CBC AUTOMATED: CPT | Performed by: PATHOLOGY

## 2024-11-18 NOTE — PROGRESS NOTES
ANTICOAGULATION MANAGEMENT     Duane C Johnson 74 year old male is on warfarin with therapeutic INR result. (Goal INR 2.0-3.0)    Recent labs: (last 7 days)     11/18/24  1416   INR 2.93*       ASSESSMENT     Source(s): Chart Review and Patient/Caregiver Call     Warfarin doses taken: Warfarin taken as instructed  Diet: No new diet changes identified  Medication/supplement changes: Started digoxin - No interaction noted.   New illness, injury, or hospitalization: No  Signs or symptoms of bleeding or clotting: No  Previous result: Therapeutic last 2(+) visits  Additional findings: None       PLAN     Recommended plan for no diet, medication or health factor changes affecting INR     Dosing Instructions: decrease your warfarin dose (15.4% change) with next INR in 1 week       Summary  As of 11/18/2024      Full warfarin instructions:  1.25 mg every Mon, Wed, Fri; 2.5 mg all other days   Next INR check:  11/26/2024               Telephone call with Duane & Sharon who verbalizes understanding and agrees to plan and who agrees to plan and repeated back plan correctly    Lab visit scheduled    Education provided: Please call back if any changes to your diet, medications or how you've been taking warfarin    Plan made with [facility] Pharmacist Pratima Gabriel and per LVAD protocol    Carlyn Prince RN  11/18/2024  Anticoagulation Clinic  [employer] for routing messages: susannah ANTICOAG LVAD  [facility] patient phone line: [phone]        _______________________________________________________________________     Anticoagulation Episode Summary       Current INR goal:  2.0-3.0   TTR:  100.0% (5 d)   Target end date:  Indefinite   Send INR reminders to:  ANTICOAG LVAD    Indications    Chronic systolic congestive heart failure (H) [I50.22]  Anticoagulated on warfarin [Z79.01]  LVAD (left ventricular assist device) present (H) [Z95.811]  Other specified hypotension [I95.89]             Comments:  Follow VAD Anticoag protocol:Yes: HeartMate  3  Bridging: Call MD each time due to time since implant surgery  Date VAD placed: 9/18/2024                 Anticoagulation Care Providers       Provider Role Specialty Phone number    Ham Cruz MD Referring Advanced Heart Failure and Transplant Cardiology [phone]    Sravanthi Asencio MD Referring Cardiovascular Disease [phone]

## 2024-11-19 ENCOUNTER — CARE COORDINATION (OUTPATIENT)
Dept: CARDIOLOGY | Facility: CLINIC | Age: 74
End: 2024-11-19
Payer: MEDICARE

## 2024-11-19 ENCOUNTER — TELEPHONE (OUTPATIENT)
Dept: CARDIOLOGY | Facility: CLINIC | Age: 74
End: 2024-11-19
Payer: MEDICARE

## 2024-11-19 VITALS — WEIGHT: 129.9 LBS | BODY MASS INDEX: 20.35 KG/M2

## 2024-11-19 DIAGNOSIS — Z95.811 LVAD (LEFT VENTRICULAR ASSIST DEVICE) PRESENT (H): Primary | ICD-10-CM

## 2024-11-19 NOTE — PROGRESS NOTES
"Pt paged the VAD coordinator on call to report feeling light headed and faint.   Called pt and wife and the stated pt had an episode this am of feeling dizzy and then slumping down to the floor. Pt recovered after a few seconds. Wife was able to help him get up with walker and get to the couch. Once they reached the couch, wife stated pt started to shake and his head was bobbing.   Wife states pt's flow reading was a lower reading for about 4 minutes but the controller did not alarms. She is unsure what the reading was  Pt feels fine now. He states he probably hasn't drunk enough water today. He has had about 28oz so far, would usually have drunk closer to 32oz.   MAP: 70. Losartan decreased yesterday, they started the new dosing today.  VAD parameters as follows: Speed: 5000rpm's, Flow: 3.7l/min, PI: 3, Power: 3.2w  Weight: 129.9lb \"little lower\" than yesterday. Upon chart review, pt stated weight on 11/11 was 136lb.   Reviewed findings with Dr. Cruz and pt's primary VAD Coordinator. MD instructed for pt to increase po fluid intake and check MAP BID - 1hr after meds in the am and 1hr after meds in the pm or at bedtime.   Called pt and wife back to relay instructions. Verbalized understanding.     "

## 2024-11-19 NOTE — PROGRESS NOTES
Entered cardio MTM referral per discussion with Dr. Cruz.     Aranza Hameed, PharmD  Medication Therapy Management Pharmacist  [facility]

## 2024-11-19 NOTE — TELEPHONE ENCOUNTER
Called patient's spouse Melissa to offer 7am VAD appointment on 11/20 with Anitha Forrest for the patient's recent dizziness concern. Patient and spouse both firmly declined. Spouse expressed frustration at being offered early appts when they've asked only for afternoon appointments. I re-explained that cardiology is currently understaffed for providers and therefore short on appointments at this time. I expressed apologies for lack of appointments. They will virtually see Dr. Cruz next week as scheduled. I informed patient and spouse that if we do get afternoon appointments this week, I will call again. They verbalized understanding.

## 2024-11-20 ENCOUNTER — CARE COORDINATION (OUTPATIENT)
Dept: CARDIOLOGY | Facility: CLINIC | Age: 74
End: 2024-11-20
Payer: MEDICARE

## 2024-11-20 NOTE — PROGRESS NOTES
Called patient to check in 1 week post discharge from VAD implant hospitilization. Pt reports weight 130.1, MAP 85 this morning.     11/20/24 1345   Heartmate 3 LEFT VS   Flow (Lpm) 3.2 Lpm   Pulse Index (PI) 5 PI   Speed (rpm) 5000 rpm   Power (jackson) 3.2 jackson        Reviewed medications and answered any questions. Patient reports sleeping great and  anxiety since being home with LVAD. Patient is able to move around the house and care for himself  with assistance, and using walker, just going around the house at this time.    Discussed specific new problems/stressors since being discharged from the hospital: patient is unable to drive.  Empathized with patient and reviewed coping strategies: enlisting support from friends and love ones, attending patient and caregiver support groups, reviewing LVAD educational materials to reinforce knowledge, and talking about concerns with family/care providers/trusted others. Encouraged pt to page VAD Coordinator with any issues or questions. Pt verbalizes understanding.

## 2024-11-22 ENCOUNTER — LAB (OUTPATIENT)
Dept: LAB | Facility: CLINIC | Age: 74
End: 2024-11-22
Payer: MEDICARE

## 2024-11-22 DIAGNOSIS — Z79.01 ANTICOAGULATED ON WARFARIN: ICD-10-CM

## 2024-11-22 DIAGNOSIS — I50.22 CHRONIC SYSTOLIC CONGESTIVE HEART FAILURE (H): ICD-10-CM

## 2024-11-22 DIAGNOSIS — Z95.811 LVAD (LEFT VENTRICULAR ASSIST DEVICE) PRESENT (H): ICD-10-CM

## 2024-11-22 LAB — INR PPP: 2.76 (ref 0.85–1.15)

## 2024-11-22 PROCEDURE — 85610 PROTHROMBIN TIME: CPT

## 2024-11-22 PROCEDURE — 36415 COLL VENOUS BLD VENIPUNCTURE: CPT

## 2024-11-25 ENCOUNTER — LAB (OUTPATIENT)
Dept: LAB | Facility: CLINIC | Age: 74
End: 2024-11-25
Payer: MEDICARE

## 2024-11-25 ENCOUNTER — CARE COORDINATION (OUTPATIENT)
Dept: CARDIOLOGY | Facility: CLINIC | Age: 74
End: 2024-11-25

## 2024-11-25 ENCOUNTER — OFFICE VISIT (OUTPATIENT)
Dept: FAMILY MEDICINE | Facility: CLINIC | Age: 74
End: 2024-11-25
Payer: MEDICARE

## 2024-11-25 DIAGNOSIS — Z95.811 LVAD (LEFT VENTRICULAR ASSIST DEVICE) PRESENT (H): ICD-10-CM

## 2024-11-25 DIAGNOSIS — Z95.811 HISTORY OF LEFT VENTRICULAR ASSIST DEVICE (LVAD) (H): Primary | ICD-10-CM

## 2024-11-25 DIAGNOSIS — I50.22 CHRONIC SYSTOLIC CONGESTIVE HEART FAILURE (H): ICD-10-CM

## 2024-11-25 DIAGNOSIS — Z79.01 ANTICOAGULATED ON WARFARIN: ICD-10-CM

## 2024-11-25 DIAGNOSIS — I95.89 OTHER SPECIFIED HYPOTENSION: ICD-10-CM

## 2024-11-25 DIAGNOSIS — I50.22 CHRONIC SYSTOLIC CONGESTIVE HEART FAILURE (H): Primary | ICD-10-CM

## 2024-11-25 PROCEDURE — 99214 OFFICE O/P EST MOD 30 MIN: CPT | Performed by: FAMILY MEDICINE

## 2024-11-25 NOTE — PROGRESS NOTES
Duane called from a primary care doctor provider visit to say he has fainted several times in the past day. He has fallen several times with these brief fainting spells. His recent MAPS have been . RPM 5000, flow 3.3, 3.4 PI, and 2.1 jackson. He is having frequent drops to the low speed limit. Duane said he has been drinking, he thinks, enough fluids. He says that he feels good with the exception of the fainting.    I advised Star to have Melissa bring him to the [facility] ED to be evaluated. A VAD Coordinator will send in logfiles to the engineers at [facility].

## 2024-11-25 NOTE — PROGRESS NOTES
"D: Called patient to follow up/check in since he has not reported to ED as advised this AM.     I/A:   Reports,  that his dizzy/fainting/falling spells \"aren't that bad\" and that he has not been injured. He has been drinking 3L daily of gatorade and water.   Pt reports only \"A few Vad alarms\" - a few days ago.   MAP between    Wt 128-130.   Pt states VAD parameters are stable.       P: Patient is unwilling to come to the ED, told him that it is our recommendation and that he may show up to clinic tomorrow and be told to go to ED. Will discuss with Dr. Cruz.  Patient, Family notified to page on-call coordinator if symptoms worsen or with other concerns. Patient, Family verbalized understanding.     "

## 2024-11-25 NOTE — PROGRESS NOTES
S :Duane C Johnson is a 74 year old male with LVAD, hypotension, syncope, near syncope.      Has been ongoing problem.  A bit worse this AM.  No fever, illness or sob.  Feels fine, just having frequent fainting spells     Wife here too.      O:There were no vitals taken for this visit.  GEN: Alert and oriented, in no acute distress  Sitting calmly in chair.  Talking calmly  Can't get BP  Can hear LVAD on auscultation.  Rate fine    Called LVAD team on phone, had conference in room.  They went over LVAD stats, recommended he come down to their ED, see if they can change paramaters, figure out best settings for him, address his sx     A: syncope       Heart faliure, end stage, LVAD    P: they will continue to push fluids, make sure to have lunch, head down to team on [facility].      Wife agrees with plan.      30  min spent on date of encounter reviewing chart, history, physical, documenting and counseling as mentioned in this note

## 2024-11-26 ENCOUNTER — LAB (OUTPATIENT)
Dept: LAB | Facility: CLINIC | Age: 74
End: 2024-11-26
Payer: MEDICARE

## 2024-11-26 ENCOUNTER — OFFICE VISIT (OUTPATIENT)
Dept: CARDIOLOGY | Facility: CLINIC | Age: 74
End: 2024-11-26
Attending: INTERNAL MEDICINE
Payer: MEDICARE

## 2024-11-26 ENCOUNTER — ANTICOAGULATION THERAPY VISIT (OUTPATIENT)
Dept: ANTICOAGULATION | Facility: CLINIC | Age: 74
End: 2024-11-26

## 2024-11-26 VITALS — BODY MASS INDEX: 20.05 KG/M2 | OXYGEN SATURATION: 98 % | SYSTOLIC BLOOD PRESSURE: 58 MMHG | WEIGHT: 128 LBS

## 2024-11-26 DIAGNOSIS — I95.9 TRANSIENT HYPOTENSION: ICD-10-CM

## 2024-11-26 DIAGNOSIS — Z95.811 LVAD (LEFT VENTRICULAR ASSIST DEVICE) PRESENT (H): ICD-10-CM

## 2024-11-26 DIAGNOSIS — I50.22 CHRONIC SYSTOLIC HEART FAILURE (H): ICD-10-CM

## 2024-11-26 DIAGNOSIS — Z79.01 ANTICOAGULATED ON WARFARIN: ICD-10-CM

## 2024-11-26 DIAGNOSIS — I95.89 OTHER SPECIFIED HYPOTENSION: ICD-10-CM

## 2024-11-26 DIAGNOSIS — I50.22 CHRONIC SYSTOLIC CONGESTIVE HEART FAILURE (H): Primary | ICD-10-CM

## 2024-11-26 DIAGNOSIS — E87.1 HYPONATREMIA: ICD-10-CM

## 2024-11-26 DIAGNOSIS — I50.42 CHRONIC COMBINED SYSTOLIC AND DIASTOLIC HEART FAILURE (H): ICD-10-CM

## 2024-11-26 DIAGNOSIS — I50.22 CHRONIC SYSTOLIC CONGESTIVE HEART FAILURE (H): ICD-10-CM

## 2024-11-26 DIAGNOSIS — R42 DIZZINESS: ICD-10-CM

## 2024-11-26 DIAGNOSIS — I50.42 CHRONIC COMBINED SYSTOLIC AND DIASTOLIC HEART FAILURE (H): Primary | ICD-10-CM

## 2024-11-26 LAB
ALBUMIN SERPL BCG-MCNC: 3.9 G/DL (ref 3.5–5.2)
ALP SERPL-CCNC: 121 U/L (ref 40–150)
ALT SERPL W P-5'-P-CCNC: 48 U/L (ref 0–70)
ANION GAP SERPL CALCULATED.3IONS-SCNC: 10 MMOL/L (ref 7–15)
AST SERPL W P-5'-P-CCNC: 43 U/L (ref 0–45)
BILIRUB SERPL-MCNC: 0.4 MG/DL
BUN SERPL-MCNC: 32.4 MG/DL (ref 8–23)
CALCIUM SERPL-MCNC: 9 MG/DL (ref 8.8–10.4)
CHLORIDE SERPL-SCNC: 94 MMOL/L (ref 98–107)
CREAT SERPL-MCNC: 0.72 MG/DL (ref 0.67–1.17)
EGFRCR SERPLBLD CKD-EPI 2021: >90 ML/MIN/1.73M2
ERYTHROCYTE [DISTWIDTH] IN BLOOD BY AUTOMATED COUNT: 14.5 % (ref 10–15)
GLUCOSE SERPL-MCNC: 109 MG/DL (ref 70–99)
HCO3 SERPL-SCNC: 23 MMOL/L (ref 22–29)
HCT VFR BLD AUTO: 32.8 % (ref 40–53)
HGB BLD-MCNC: 11.2 G/DL (ref 13.3–17.7)
INR PPP: 2.49 (ref 0.85–1.15)
LDH SERPL L TO P-CCNC: 227 U/L (ref 0–250)
MCH RBC QN AUTO: 30.5 PG (ref 26.5–33)
MCHC RBC AUTO-ENTMCNC: 34.1 G/DL (ref 31.5–36.5)
MCV RBC AUTO: 89 FL (ref 78–100)
NT-PROBNP SERPL-MCNC: 2171 PG/ML (ref 0–900)
PLATELET # BLD AUTO: 166 10E3/UL (ref 150–450)
POTASSIUM SERPL-SCNC: 4.3 MMOL/L (ref 3.4–5.3)
PROT SERPL-MCNC: 7.1 G/DL (ref 6.4–8.3)
RBC # BLD AUTO: 3.67 10E6/UL (ref 4.4–5.9)
SODIUM SERPL-SCNC: 127 MMOL/L (ref 135–145)
WBC # BLD AUTO: 6.1 10E3/UL (ref 4–11)

## 2024-11-26 PROCEDURE — 36415 COLL VENOUS BLD VENIPUNCTURE: CPT | Performed by: PATHOLOGY

## 2024-11-26 PROCEDURE — 85610 PROTHROMBIN TIME: CPT | Performed by: PATHOLOGY

## 2024-11-26 PROCEDURE — 83615 LACTATE (LD) (LDH) ENZYME: CPT | Performed by: PATHOLOGY

## 2024-11-26 PROCEDURE — 83880 ASSAY OF NATRIURETIC PEPTIDE: CPT | Performed by: PATHOLOGY

## 2024-11-26 PROCEDURE — 93750 INTERROGATION VAD IN PERSON: CPT | Performed by: INTERNAL MEDICINE

## 2024-11-26 PROCEDURE — 80053 COMPREHEN METABOLIC PANEL: CPT | Performed by: PATHOLOGY

## 2024-11-26 PROCEDURE — 85027 COMPLETE CBC AUTOMATED: CPT | Performed by: PATHOLOGY

## 2024-11-26 PROCEDURE — G0463 HOSPITAL OUTPT CLINIC VISIT: HCPCS | Performed by: INTERNAL MEDICINE

## 2024-11-26 RX ORDER — LOSARTAN POTASSIUM 25 MG/1
25 TABLET ORAL AT BEDTIME
Qty: 90 TABLET | Refills: 3 | Status: SHIPPED | OUTPATIENT
Start: 2024-11-26

## 2024-11-26 ASSESSMENT — PAIN SCALES - GENERAL: PAINLEVEL_OUTOF10: NO PAIN (0)

## 2024-11-26 NOTE — LETTER
"Faxed to:  [facility]  Fax Number:  [phone]                                                                                                                                                                        [email or web address]   Email Order Form to [email or web address]  Phone: [phone] Fax: [phone]  Patient Name: Duane C Johnson Date:  11/26/24   Facility Name: [facility]  Fax Number: [phone]    VAD Coordinator/ :   Franchesca Haddad RN Phone: [phone]    Email Address:         [email or web address]   Patient's Primary Insurance Upon Receiving the VAD unit:          Dressing Change Frequency: Daily X Every Other Day   Other (Specify)              Duration of Need:  DT (Lifetime) X BTT (until transplant)   Other (Specify)      NEW ORDERS ONLY: PLEASE SUBMIT PATIENT DEMOGRAPHICS, POST OP NOTES & NOTES FROM MOST RECENT CLINICAL VISIT WITH ORDER FORM.                  Fax: [phone]  Or Email Order Form to   [email or web address]     ck Product Size/ Description Quantity    Kit Options     X Medline Driveline Management Tray [id number]    Daily Up to 30    Medline Driveline Management Tray [id number]  Sensitive Up to 30   X Medline Driveline Management Tray [id number]  Weekly Up to 10          Reading Options     X [employer] Reading /  Michael Reading [id number] / [id number] Up to 30   X CathGrip / CathGrip Low Profile Securement Reading Sm/1 strap  Med/2 Strap    Large/2 Strap Up to 30    MC Mingo Cath Secure Dual Tab Reading [id number] Up to 30    Abdominal Binder Sm              Med              Lg   Up to 1          Adhesive Options     X Medipore Tape 1\"     2             cloth Up to 5 rolls    3M Micropore S Surgical Tape (Kind Removal Silicone Tape) 1\"     2\" Up to 5 rolls    Safe N Simple Blue silicone tape 1\"     2\" Up to 5 rolls    Blenderm Surgical Tape / Transpore Tape 1\"     " "2\" Up to 5 rolls    Simpurity Silicone Transparent Film 4\" x 5\" Up to 30    Smith and Nephew IV 3000 Transparent Film 4\" x 5.5\" Up to 30          Alternate Cleansers      Betadine Swabsticks (Povidone Iodine) 3/pack Up to 30          Individual Supplies      Aquaguard/Shower Shield 7 x 7       9  x 9      10  x 12  Up to 35   X FreeDerm Adhesive Remover Wipes Wipes Up to 60    FreeDerm Adhesive Remover Spray 1 oz  3oz  Spray             Up to 4    Uni-Solve Adhesive Remover Wipes Box Up to 2    BioPlus Skin Barrier Wipes Box  Up to 2    3M Cavilon No-sting Barrier Swabstick Swabstick Up to 60   X Sterile Vinyl PF gloves Sizes     8                Pair Up to 30    Non Sterile Gloves 100/box        Size: S    M    L   XL Up to 3    Biopatch 1       1.5\" Up to 30    Silverlon 1.5cm Square disc [id number] Up to 30    Alcohol Prep Pads Box Up to 1 box     My signature below certifies the medical necessity of the above approved products and I certify the patient has been trained in the use of all products. The patient is aware that [facility] will contact him/her regarding the shipment of ordered dressing supplies.     Provider Name: Ham Cruz NPI#: [id number]   Provider Signature:  Digitally signed by   Ham Cruz 11/26/2024 09:25AM Provider Number: [phone]   [facility] will confirm receipt for all initial orders by sending a fax to the provider listed above.  "

## 2024-11-26 NOTE — LETTER
2024      RE: Duane C Johnson  [id number] [facility] [address]       Dear Colleague,    Thank you for the opportunity to participate in the care of your patient, Duane C Johnson, at the [facility] HEART [facility] at [facility]. Please see a copy of my visit note below.    [facility]  Advanced Heart Failure Clinic    Patient Name: Duane C Johnson   : 1950   Date of Visit: 2024    Primary Care Physician: Jose Coles MD     Referring Physician: No ref. provider found   Reason for Visit: LVAD    Dear Dr. Sanket MD     I had the pleasure of seeing Duane C Johnson today in the [facility] Advanced Heart Failure Clinic. As you know Duane C Johnson is a pleasant 74 year old year old male, who presents today for further evaluation of LVAD.    Duane has a history of end-stage cardiomyopathy, history of coronary artery disease, previous STEMI, history of VT VF arrest, status post placement of ICD, history of A-fib a flutter with previous cardioversion, status post HeartMate 3 LVAD implantation 2024.    Duane was discharged to ARU on 10/5/24. There, he continued to have frequent low flow alarms with labile BPs and has required frequent adjustments to BP medications, initiation of florinef, as well as IVF and increased PO intake. Seen in clinic on 10/23 where he was presyncopal with MAPs in 40s. He was sent from clinic to ED for further evaluation. Admitted 10/23/24 for further workup of hypotension, hypovolemia, and low flows. Underwent stroke eval for neurological changes with negative CT. Symptoms improved following 2L IVF and improved MAPs. He was resumed on low doses of GDMT/afterload reduction. During this hospitalization continued to have several more runs of low flows. MAPs/meds optimized and prior to discharge he was without low flows/high PIs x 3 days. Controlled was also switched to low flow controlled.  Discharge was delayed due to needing additional LVAD education. Consulted neuropsych due to concerns for cognitive changes - Alzheimer's disease or possibly mixed pathology (mild cognitive impairment).     Following discharge, he presented to the emergency room on 11/10/2024 with witnessed syncopal episode, feeling lightheaded which led to a fall to the ground but he did not strike his head or suffer any injury.  He reported that he did not lose consciousness although EMS reported that family witnessed a brief loss of consciousness episode.  The ER spoke to Dr. Nicolas, who suggested aggressive hydration as he is known to have hypovolemia and orthostasis and he had not had much to eat or drink that day before this episode. He did not have a low flow alarm then.    We saw him a couple weeks ago when we continued to encourage him to drink more fluid. Since then he had some hypotension, and we reduced dose of losartan to 25 mg AM and 50 mg PM from 50 mg BID.   Yesterday he had a lot of dizziness/ fainting and MAP was in 70s.  We advised him to go to the emergency room but he and his wife declined and due to the travel time along and his wife unable to being able to take off from work so much.  Today he says he has had a lot to drink (gatorade), and he and his wife confirm that he is drinking at least 100 oz of fluid per day water/ gatorade, milk, one 14 oz of Core 42 protein drink, has cut down on mountain dew to less than one per day. His LVAD speed did drop to 4800 rpm a few times, from set limit of 5000 rpm. His low flow alarm is set to 2L/min, and he had low flow alarms last few days, many of which were very brief.     He is mostly not using his walker, and reports he is using it if walking further than just going from couch to bathroom (about 20 feet) when he holds on to walls/doorways. His wife is with him 24x7, she works from home.    He won't start cardiac rehab until the day after [place].     Home BPs -  Doppler at home MAPs in 70s, none lower, but MAP here in the 50s  Home weights - not checking    ROS:  A complete 10-point ROS was negative except as above.    PAST MEDICAL HISTORY:  Past Medical History:   Diagnosis Date     Benign essential hypertension      CAD (coronary artery disease)      Chronic systolic heart failure (H)      Hypertension      ST elevation myocardial infarction (STEMI), unspecified artery (H)      Ventricular fibrillation (H)        PAST SURGICAL HISTORY:  Past Surgical History:   Procedure Laterality Date     ANESTHESIA CARDIOVERSION N/A 9/5/2024    Procedure: Anesthesia cardioversion;  Surgeon: GENERIC ANESTHESIA PROVIDER;  Location: [facility]     ANESTHESIA CARDIOVERSION N/A 9/30/2024    Procedure: Anesthesia cardioversion;  Surgeon: GENERIC ANESTHESIA PROVIDER;  Location: [facility]     COLONOSCOPY N/A 3/23/2023    Procedure: COLONOSCOPY, FLEXIBLE, WITH LESION REMOVAL USING SNARE;  Surgeon: Jose Faustin MD;  Location: [facility]     CV CENTRAL VENOUS CATHETER PLACEMENT N/A 10/26/2023    Procedure: Central Venous Catheter Placement;  Surgeon: Rob Lyles MD;  Location:  HEART CARDIAC CATH LAB     CV CORONARY ANGIOGRAM N/A 10/27/2023    Procedure: Coronary Angiogram;  Surgeon: Rob Lyles MD;  Location:  HEART CARDIAC CATH LAB     CV CORONARY ANGIOGRAM N/A 10/26/2023    Procedure: Coronary Angiogram;  Surgeon: Rob Lyles MD;  Location:  HEART CARDIAC CATH LAB     CV LEFT HEART CATH N/A 10/26/2023    Procedure: Left Heart Catheterization;  Surgeon: Rob Lyles MD;  Location:  HEART CARDIAC CATH LAB     CV PCI N/A 10/27/2023    Procedure: Percutaneous Coronary Intervention;  Surgeon: Rob Lyles MD;  Location: [facility] CARDIAC CATH LAB     CV PCI STENT DRUG ELUTING N/A 10/26/2023    Procedure: Percutaneous Coronary Intervention Stent;  Surgeon: Rob Lyles MD;  Location: [facility] CARDIAC CATH LAB     CV RIGHT HEART CATH  MEASUREMENTS RECORDED N/A 5/6/2024    Procedure: Heart Cath Right Heart Cath;  Surgeon: Rob Lyles MD;  Location:  HEART CARDIAC CATH LAB     CV RIGHT HEART CATH MEASUREMENTS RECORDED N/A 9/3/2024    Procedure: Right Heart Catheterization;  Surgeon: Aaron Mesa MD;  Location:  HEART CARDIAC CATH LAB     CV RIGHT HEART CATH MEASUREMENTS RECORDED N/A 10/24/2024    Procedure: Right Heart Catheterization;  Surgeon: Haile Braden MD;  Location:  HEART CARDIAC CATH LAB     EP ICD INSERT SINGLE N/A 5/8/2024    Procedure: Implantable Cardioverter Defibrillator Device & Lead Implant Dual;  Surgeon: Guero Black MD;  Location:  HEART CARDIAC CATH LAB     INJECTION, HYDROGEL SPACER N/A 4/22/2024    Procedure: INJECTION, HYDROGEL SPACER AND FIDUCIAL MARKER PLACEMENT;  Surgeon: Stanislaw Barros MD;  Location: [facility]     INSERT VENTRICULAR ASSIST DEVICE LEFT (HEARTMATE II) N/A 9/18/2024    Procedure: Median Sternotomy, INSERTION of LEFT VENTRICULAR ASSIST DEVICE (HEARTMATE III), cardiopulmonary bypass, transesophageal echocardiogram performed by anesthesia.;  Surgeon: Mulvihill, Michael, MD;  Location: [facility]     IRRIGATION AND DEBRIDEMENT CHEST WASHOUT, COMBINED Right 9/18/2024    Procedure: Exploration of chest, chest washout;  Surgeon: Mulvihill, Michael, MD;  Location: [facility]       FAMILY HISTORY:  Family History   Problem Relation Age of Onset     Other Cancer Mother      Obesity Mother      GI problems Father      Uterine Cancer Sister        SOCIAL HISTORY:  Social History     Socioeconomic History     Marital status:      Spouse name: None     Number of children: None     Years of education: None     Highest education level: None   Tobacco Use     Smoking status: Former     Current packs/day: 0.00     Average packs/day: 0.3 packs/day for 30.0 years (7.5 ttl pk-yrs)     Types: Cigarettes     Start date: 10/26/1993     Quit date: 10/26/2023     Years since  quittin.0     Passive exposure: Past     Smokeless tobacco: Never     Tobacco comments:     Quit 10/26/2023   Vaping Use     Vaping status: Never Used   Substance and Sexual Activity     Alcohol use: Yes     Comment: 1-2 beers per day     Drug use: No     Sexual activity: Yes     Partners: Female     Birth control/protection: None   Other Topics Concern     Parent/sibling w/ CABG, MI or angioplasty before 65F 55M? No     Social Drivers of Health     Financial Resource Strain: Low Risk  (10/23/2024)    Financial Resource Strain      Within the past 12 months, have you or your family members you live with been unable to get utilities (heat, electricity) when it was really needed?: No   Food Insecurity: Low Risk  (10/23/2024)    Food Insecurity      Within the past 12 months, did you worry that your food would run out before you got money to buy more?: No      Within the past 12 months, did the food you bought just not last and you didn t have money to get more?: No   Transportation Needs: Low Risk  (10/23/2024)    Transportation Needs      Within the past 12 months, has lack of transportation kept you from medical appointments, getting your medicines, non-medical meetings or appointments, work, or from getting things that you need?: No   Physical Activity: Inactive (2023)    Exercise Vital Sign      Days of Exercise per Week: 0 days      Minutes of Exercise per Session: 0 min   Social Connections: Unknown (2023)    Social Connection and Isolation Panel [NHANES]      Marital Status:    Interpersonal Safety: Low Risk  (10/23/2024)    Interpersonal Safety      Do you feel physically and emotionally safe where you currently live?: Yes      Within the past 12 months, have you been hit, slapped, kicked or otherwise physically hurt by someone?: No      Within the past 12 months, have you been humiliated or emotionally abused in other ways by your partner or ex-partner?: No   Recent Concern:  Interpersonal Safety - High Risk (10/17/2024)    Interpersonal Safety      Do you feel physically and emotionally safe where you currently live?: No      Within the past 12 months, have you been hit, slapped, kicked or otherwise physically hurt by someone?: No      Within the past 12 months, have you been humiliated or emotionally abused in other ways by your partner or ex-partner?: No   Housing Stability: Low Risk  (10/23/2024)    Housing Stability      Do you have housing? : Yes      Are you worried about losing your housing?: No   Recent Concern: Housing Stability - High Risk (9/1/2024)    Housing Stability      Do you have housing? : No      Are you worried about losing your housing?: No       MEDICATIONS:  Current Outpatient Medications   Medication Sig Dispense Refill     acetaminophen (TYLENOL) 325 MG tablet Take 2 tablets (650 mg) by mouth every 4 hours as needed for other (For optimal non-opioid multimodal pain management to improve pain control.).       amiodarone (PACERONE) 200 MG tablet Take 1 tablet (200 mg) by mouth daily. 90 tablet 3     amLODIPine (NORVASC) 10 MG tablet Take 1 tablet (10 mg) by mouth daily. 90 tablet 3     atorvastatin (LIPITOR) 80 MG tablet Take 1 tablet (80 mg) by mouth every evening. 90 tablet 3     digoxin (LANOXIN) 62.5 MCG tablet Take 1 tablet (62.5 mcg) by mouth daily. 30 tablet 11     escitalopram (LEXAPRO) 10 MG tablet Take 1 tablet (10 mg) by mouth or Feeding Tube daily. 90 tablet 3     gabapentin (NEURONTIN) 100 MG capsule Take 1 capsule (100 mg) by mouth or Feeding Tube at bedtime. 90 capsule 3     hydrALAZINE (APRESOLINE) 25 MG tablet Take 1 tablet (25 mg) by mouth 3 times daily. 180 tablet 3     losartan (COZAAR) 50 MG tablet Take 25mg in the AM and 50mg in the PM       magnesium hydroxide (MILK OF MAGNESIA) 400 MG/5ML suspension Take 30 mLs by mouth daily as needed for constipation (Use if polyethylene glycol (Miralax) is not effective after 24 hours.). 354 mL 0      magnesium oxide (MAG-OX) 400 MG tablet Take 1 tablet (400 mg) by mouth daily. 90 tablet 3     melatonin 10 MG TABS tablet Take 1 tablet (10 mg) by mouth every evening.       multivitamin w/minerals (THERA-VIT-M) tablet Take 1 tablet by mouth daily. 90 tablet 3     warfarin ANTICOAGULANT (COUMADIN) 2.5 MG tablet Take 1 tablet (2.5 mg) by mouth every evening. 90 tablet 3     No current facility-administered medications for this visit.        ALLERGIES:     Allergies   Allergen Reactions     Brilinta [Ticagrelor] Other (See Comments) and Difficulty breathing     Per pt and spouse, hyperventilation.     Clonazepam Other (See Comments)     Per spouse, acted like a zombie and he was shaky, could barely talk.       PHYSICAL EXAM:  BP (!) 58/0 (BP Location: Left arm, Patient Position: Chair, Cuff Size: Adult Small)   Wt 58.1 kg (128 lb)   SpO2 98%   BMI 20.05 kg/m      Gen: alert, interactive, NAD  Neck: supple, no JVD  CV: VAD hum+  Chest: CTAB, no wheezes or crackles  Abd: soft, NT, ND, +BS, driveline dressing in place  Ext: no LE edema    LABS:    CMP  Last Comprehensive Metabolic Panel:  Sodium   Date Value Ref Range Status   11/26/2024 127 (L) 135 - 145 mmol/L Final   05/26/2021 141 133 - 144 mmol/L Final     Sodium Whole Blood   Date Value Ref Range Status   10/27/2023 138 135 - 145 mmol/L Final     Potassium   Date Value Ref Range Status   11/26/2024 4.3 3.4 - 5.3 mmol/L Final   05/26/2021 4.5 3.4 - 5.3 mmol/L Final     Potassium Whole Blood   Date Value Ref Range Status   09/23/2024 2.5 (LL) 3.4 - 5.3 mmol/L Final     Potassium POCT   Date Value Ref Range Status   10/23/2024 3.8 3.4 - 5.3 mmol/L Final     Chloride   Date Value Ref Range Status   11/26/2024 94 (L) 98 - 107 mmol/L Final   05/26/2021 105 94 - 109 mmol/L Final     Chloride Whole Blood   Date Value Ref Range Status   10/27/2023 103 94 - 109 mmol/L Final     Carbon Dioxide   Date Value Ref Range Status   05/26/2021 29 20 - 32 mmol/L Final     Carbon  Dioxide (CO2)   Date Value Ref Range Status   11/26/2024 23 22 - 29 mmol/L Final     Carbon Dioxide Whole Blood   Date Value Ref Range Status   10/27/2023 27 20 - 32 mmol/L Final     Anion Gap   Date Value Ref Range Status   11/26/2024 10 7 - 15 mmol/L Final   05/26/2021 7 3 - 14 mmol/L Final     Glucose   Date Value Ref Range Status   11/26/2024 109 (H) 70 - 99 mg/dL Final   05/26/2021 125 (H) 70 - 99 mg/dL Final     GLUCOSE BY METER POCT   Date Value Ref Range Status   11/09/2024 111 (H) 70 - 99 mg/dL Final     Urea Nitrogen   Date Value Ref Range Status   11/26/2024 32.4 (H) 8.0 - 23.0 mg/dL Final   05/26/2021 41 (H) 7 - 30 mg/dL Final     Creatinine   Date Value Ref Range Status   11/26/2024 0.72 0.67 - 1.17 mg/dL Final   05/26/2021 0.80 0.66 - 1.25 mg/dL Final     GFR Estimate   Date Value Ref Range Status   11/26/2024 >90 >60 mL/min/1.73m2 Final     Comment:     eGFR calculated using 2021 CKD-EPI equation.   05/26/2021 90 >60 mL/min/[1.73_m2] Final     Comment:     Non  GFR Calc  Starting 12/18/2018, serum creatinine based estimated GFR (eGFR) will be   calculated using the Chronic Kidney Disease Epidemiology Collaboration   (CKD-EPI) equation.       GFR, ESTIMATED POCT   Date Value Ref Range Status   10/23/2024 >60 >60 mL/min/1.73m2 Final     Calcium   Date Value Ref Range Status   11/26/2024 9.0 8.8 - 10.4 mg/dL Final     Comment:     Reference intervals for this test were updated on 7/16/2024 to reflect our healthy population more accurately. There may be differences in the flagging of prior results with similar values performed with this method. Those prior results can be interpreted in the context of the updated reference intervals.   05/26/2021 8.3 (L) 8.5 - 10.1 mg/dL Final     Bilirubin Total   Date Value Ref Range Status   11/26/2024 0.4 <=1.2 mg/dL Final   03/20/2018 0.5 0.2 - 1.3 mg/dL Final     Alkaline Phosphatase   Date Value Ref Range Status   11/26/2024 121 40 - 150 U/L Final  "  03/20/2018 82 40 - 150 U/L Final     ALT   Date Value Ref Range Status   11/26/2024 48 0 - 70 U/L Final   03/20/2018 22 0 - 70 U/L Final     AST   Date Value Ref Range Status   11/26/2024 43 0 - 45 U/L Final   03/20/2018 20 0 - 45 U/L Final       NT-proBNP       LDH      TROP  No results found for: \"TROPI\", \"TROPONIN\", \"TROPR\", \"TROPN\"    CBC  CBC RESULTS:   Recent Labs   Lab Test 11/13/24  0910   WBC 5.4   RBC 3.49*   HGB 10.6*   HCT 31.3*   MCV 90   MCH 30.4   MCHC 33.9   RDW 15.1*          LIPIDS  Recent Labs   Lab Test 09/04/24  0626 08/31/24  0414   CHOL 85 83   HDL 29* 25*   LDL 45 46   TRIG 56 59       TSH  TSH   Date Value Ref Range Status   09/04/2024 2.45 0.30 - 4.20 uIU/mL Final       HBA1C  Lab Results   Component Value Date    A1C 5.4 09/18/2024    A1C 4.8 03/13/2023         CARDIAC DATA:    EKG:  10/23/24: Atrial fibrillation, Left anterior fascicular block, Possible Lateral infarct , age undetermined     Echo:  10/23/24  LVAD cannula was seen in the expected anatomic position in the LV apex.  HM3 at 5100RPM.  LVIDd 47mm.  Septum normal.  Aortic valve open intermittently. No AI.  Normal flow velocities.  Global right ventricular function is mildly to moderately reduced.  Small circumferential pericardial effusion is present without any hemodynamic significance.    Cardiac Catheterization:  [facility] 10/23/24  BSA 1.75 m2  /89/101 mmHg  RA --/--/6 mmHg  RV 30/3mmHg  PA 30/14/20 mmHg  PCWP --/--/9 mmHg  TD CO/CI 4.1/2.34   Goldy CO/CI 4.54/2.59   PVR 2.68 (TD)  PA sat 65%   PCW Oximeter sat 95%  Hgb 10.5 g/dL Right sided filling pressures are normal. Left sided filling pressures are normal. Normal PA pressures. Normal cardiac output level.       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 NYHA Class III.     10/23/24 RA 6, PA 40/14/20, PCW 9, Goldy CO/CI 4.54/2.59 at 5100 rpm     Fluid status: likely hypo-volemic, and is now on an aggressive oral hydration regimen.  Advised to increase fluid intake to >120 ounce per day, mainly Gatorade/water, avoid soda,   ACEi/ARB/ARNi: losartan 25 mg AM and 50 mg PM, with persistent dizziness and hypotension, will lower dose to 25 mg PM  Vasodilator: hydralazine 25 mg q8h.  amlodipine 10 mg daily --> may need further reduction in the future  BB: deferred due to recent VAD  Aldosterone antagonist: STOPPED blane due to hypoNa  SGLT2i: STOPPED empagliflozin 10 mg due to propensity for hypovolemia    Also advised to increase salt intake in the food, salty snacks 3 times per day and increase added salt to the food.  As a next step may also need to consider adding salt tablets.  We discussed that all of this is the opposite of what we do for heart failure patients, but since his LVAD implant, he has robust urinary output and is auto diuresing, and with persistent low flow alarms, speed drops, dizziness, hypotension -would benefit from increased fluid and salt intake.  We discussed that none of these are perfect and may need continued adjustments and close monitoring based on his clinical status.    We also discussed that clinic visit should be inpatient to allow for optimal care and that we would continue to advise ER evaluation for concerning symptoms.    SCD prophylaxis: ICD  MAP: goal 70-90, encouraging POs ulitize hold parameters  LDH trends: stable 200s  Anticoagulation: warfarin dosing per pharmacy, INR goal 2-3  Antiplatelet: ASA not indicated in LVAD population, > 6 months s/p STEMI    Other:   - No fluid restriction, encourage drinks with Na/electrolytes, increase fluid intake to greater than 120 ounce per day, eat frequent salty  snacks  - Patient has a low flow controller, will not alarm until flow <2. Hematocrit is set to match his true hematocrit  - Dr. Smith has reviewed inflow and outflow positioning, nothing to do       Left ventricular assist device interrogation: The patient's HeartMate III LVAD was interrogated today 24 .     I have personally interrogated the LVAD, reviewing all parameters and alarm trends as noted in the note.     Patient is hypovolemic     Exam otherwise as noted in the note    Heartmate 3 LEFT VS  Flow (Lpm): 3.5 Lpm  Pulse Index (PI): 2.8 PI  Speed (rpm): 4800 rpm  Power (jackson): 3 jackson  Current Hct settin     Alarms were reviewed, and notable for speed drops, low flow alarms.  The driveline exit site was inspected, and showed no concerns.  All external components were inspected and showed no evidence of damage or malfunction.  No components were replaced.  No changes to VAD settings made       # Low flow alarms  # Elevated PIs  # Labile MAPs   # Suspected orthostatic hypotension, resolved  Speed optimization echo performed 10/1, speed decreased to 5100. Low flows seems releated to hypertension and some hypovolemia as well. Have improved with re-timing hydralazine and after IV fluids. CT-A was done on  10/17/24 with no outflow graft ostruction. The outflow graft does make an acute angle as it approaches the aorta, discussed with Dr. Valentine, unlikely to be impacting the low flows at this time.  Having daily low flow alarms, always in the setting of htn. Mostly ~5 am, although has had some in the evening as well. They do not seem to be positional. RHC with reasonable filling pressures as above. He has been asymptomatic.  - Decreased speed to 5000 on 10/27  - Losartan dose reduced today  - Hydralazine and amlodipine as above  - Stopped fludrocortisone 0.1 mg daily   - Increase PO intake as above, specifically right before bed, non free-water sources due to hypoNa  - Temp drop in hematocrit on  "monitor 10/29-10/30, low-flow controller change out 10/30, changed hematocrit back 10/31     # History of VT/VF arrest 2023  # AFib s/p DCCV 9/2024 and again 9/30/2024   Successfully cardioverted in early September for AF. Since then EKG suggested AF on 9/21. Tele is only available from as early as 9/22. The patient was started on hep gtt 9/21, but unclear if was in AF before this. While the patient was on hep gtt until INR was therapeutic, it is not possible to assess rhythm prior to 9/21. Systemic AC was off on 9/18-9/20. S/p MELBA and DCCV on 9/30 with conversion to sinus rhythm.  10/23 device interrogation shows persistent episode of AF since 10/10 with rates   - Continue amiodarone 200 mg daily  - S/p digoxin load, continue digoxin 62.5 mcg daily   - Ongoing a fib- given ongoing low flows- deferring cardioversion for now given reasonable rate control.   - AC as above     # Hypovolemia hyponatremia, stable  Na 127 at discharge, remains hyponatremic  - encourage high sodium PO intake/gatorade and salty foods     # Visual changes, resolved.   # Generalized weakness, improved   # Headache, resolved  Stoke code called 9/29 for visual changes - right eye with decreased upper half of vision and bluish haze. Resolved after 30 minutes. Seen by opthalmology and neuro. Negative head CT and CTA head and neck. He has had 3 episodes which last about 5 minutes before resolving completely.      On 10/9 Stroke code activated due to concern of generalized weakness, numbness and headache. LKW reported as 0715 when he worked with therapies and shortly after started feeling generally weak. Then around 1500 he had onset of moderate \"tension\" type headache and tingling in the bilateral fingertips. Per RRT URIAH patient now reports tingling has resolved and headache is improving. MAP 48, INR 2.2. Given absence of focal deficits and rapidly improving symptoms, opted to cancel stroke.  CT head negative, though did show chronic " maxillary sinusitis.      Stroke code called 10/23 when seen in clinic with report of new RUE weakness. He was notably hypotensive at this time with MAP ~45 with new right pronator drift. Evaluated in ED, CT and CTA head and neck negative. RUE weakness resolved.   - monitor for changes- no current symptoms        # Cognitive changes  - Formal neuropsych testing this admission reveiling some cognitive changes  - 24/7 care during his first 30 days, will likely not need therafter, but continue to assess with each LVAD appointment (per Ho Méndez note from 11/8 Duane has passed his VAD education)  - Needs formal driving assessment with DMV or OT prior to being cleared to drive, wife aware and also discussed with patient at time of discharge  - Repeat neuropsych testing in 1 year  - Referred to behavioral neurology in place      I spent 62 minutes in care of the patient today including obtaining recent medical history, personally reviewing recent cardiac testing and/or lab results, today's examination, discussion of testing results and care recommendations with patient.       Thank you for the opportunity to participate in this patient's care. Please feel free to reach out with any questions or concerns.      Ham Cruz MD, [facility]  Associate Professor of Medicine  Advanced Heart Failure, LVAD & Cardiac Transplant  Director, Cardio-Obstetrics  Cardio-Oncology  [facility]      Please do not hesitate to contact me if you have any questions/concerns.     Sincerely,     Ham Cruz MD

## 2024-11-26 NOTE — NURSING NOTE
MCS VAD Pump Info       Row Name 11/26/24 0815             MCS VAD Information    Implant LVAD      LVAD Pump HeartMate 3         Heartmate 3 LEFT VS    Flow (Lpm) 3.5 Lpm      Pulse Index (PI) 2.8 PI      Speed (rpm) 4800 rpm      Power (jackson) 3 jackson      Current Hct setting 33      Retired: Unexpected Alarms --         Primary Controller    Serial number [id number]      Low flow alarm setting 2      High watt alarm setting --      EBB: Patient use 11      Replace in 31 Months         Backup Controller    Serial number [id number]      EBB: Patient use 8      Replace EBB in 31 Months      Speed & HCT match primary controller --         VAD Interrogation    Alarms reported by patient Y      Unexpected alarms noted upon interrogation Low Flow  on controller not interrogation- 11/25, 24, 23, 21      PI events Frequent;w/ associated speed drops  Frequent speed drops, pi range 1.9-8.4, hx back under 1 hr      Damage to equipment is noted N      Action taken Reviewed proper equipment care and maintenance         Driveline Exit Site    Dressing change done Y      Driveline properly secured Yes  re-educated on using 2      DLES assessment --  scabbing      Dressing used Weekly kit  moved to weekly      Frequency patient changes dressing Weekly      Dressing modifications --      Dressing change supplier --                      Education Complete: Yes   - back up bag with patient wherever he goes   - with his wife at all times/wherever he goes   - Reviewed first year goal   - reviewed when to call re: weights gaining or losing

## 2024-11-26 NOTE — NURSING NOTE
Chief Complaint   Patient presents with    Follow Up     RETURN VAD - 10 week post VAD implant 9/18/24         Vitals were taken and medications reconciled.    Kumar Lara, EMT  8:16 AM

## 2024-11-26 NOTE — PROGRESS NOTES
[facility]  Advanced Heart Failure Clinic    Patient Name: Duane C Johnson   : 1950   Date of Visit: 2024    Primary Care Physician: Jose Coles MD     Referring Physician: No ref. provider found   Reason for Visit: LVAD    Dear Dr. Sanket MD     I had the pleasure of seeing Duane C Johnson today in the [facility] Advanced Heart Failure Clinic. As you know Duane C Johnson is a pleasant 74 year old year old male, who presents today for further evaluation of LVAD.    Duane has a history of end-stage cardiomyopathy, history of coronary artery disease, previous STEMI, history of VT VF arrest, status post placement of ICD, history of A-fib a flutter with previous cardioversion, status post HeartMate 3 LVAD implantation 2024.    Duane was discharged to ARU on 10/5/24. There, he continued to have frequent low flow alarms with labile BPs and has required frequent adjustments to BP medications, initiation of florinef, as well as IVF and increased PO intake. Seen in clinic on 10/23 where he was presyncopal with MAPs in 40s. He was sent from clinic to ED for further evaluation. Admitted 10/23/24 for further workup of hypotension, hypovolemia, and low flows. Underwent stroke eval for neurological changes with negative CT. Symptoms improved following 2L IVF and improved MAPs. He was resumed on low doses of GDMT/afterload reduction. During this hospitalization continued to have several more runs of low flows. MAPs/meds optimized and prior to discharge he was without low flows/high PIs x 3 days. Controlled was also switched to low flow controlled. Discharge was delayed due to needing additional LVAD education. Consulted neuropsych due to concerns for cognitive changes - Alzheimer's disease or possibly mixed pathology (mild cognitive impairment).     Following discharge, he presented to the emergency room on 11/10/2024 with witnessed syncopal episode, feeling lightheaded which led to a  fall to the ground but he did not strike his head or suffer any injury.  He reported that he did not lose consciousness although EMS reported that family witnessed a brief loss of consciousness episode.  The ER spoke to Dr. Nicolas, who suggested aggressive hydration as he is known to have hypovolemia and orthostasis and he had not had much to eat or drink that day before this episode. He did not have a low flow alarm then.    We saw him a couple weeks ago when we continued to encourage him to drink more fluid. Since then he had some hypotension, and we reduced dose of losartan to 25 mg AM and 50 mg PM from 50 mg BID.   Yesterday he had a lot of dizziness/ fainting and MAP was in 70s.  We advised him to go to the emergency room but he and his wife declined and due to the travel time along and his wife unable to being able to take off from work so much.  Today he says he has had a lot to drink (gatorade), and he and his wife confirm that he is drinking at least 100 oz of fluid per day water/ gatorade, milk, one 14 oz of Core 42 protein drink, has cut down on mountain dew to less than one per day. His LVAD speed did drop to 4800 rpm a few times, from set limit of 5000 rpm. His low flow alarm is set to 2L/min, and he had low flow alarms last few days, many of which were very brief.     He is mostly not using his walker, and reports he is using it if walking further than just going from couch to bathroom (about 20 feet) when he holds on to walls/doorways. His wife is with him 24x7, she works from home.    He won't start cardiac rehab until the day after [facility].     Home BPs - Doppler at home MAPs in 70s, none lower, but MAP here in the 50s  Home weights - not checking    ROS:  A complete 10-point ROS was negative except as above.    PAST MEDICAL HISTORY:  Past Medical History:   Diagnosis Date    Benign essential hypertension     CAD (coronary artery disease)     Chronic systolic heart failure (H)     Hypertension      ST elevation myocardial infarction (STEMI), unspecified artery (H)     Ventricular fibrillation (H)        PAST SURGICAL HISTORY:  Past Surgical History:   Procedure Laterality Date    ANESTHESIA CARDIOVERSION N/A 9/5/2024    Procedure: Anesthesia cardioversion;  Surgeon: GENERIC ANESTHESIA PROVIDER;  Location: [facility]    ANESTHESIA CARDIOVERSION N/A 9/30/2024    Procedure: Anesthesia cardioversion;  Surgeon: GENERIC ANESTHESIA PROVIDER;  Location: [facility]    COLONOSCOPY N/A 3/23/2023    Procedure: COLONOSCOPY, FLEXIBLE, WITH LESION REMOVAL USING SNARE;  Surgeon: Jose Faustin MD;  Location: [facility]    CV CENTRAL VENOUS CATHETER PLACEMENT N/A 10/26/2023    Procedure: Central Venous Catheter Placement;  Surgeon: Rob Lyles MD;  Location:  HEART CARDIAC CATH LAB    CV CORONARY ANGIOGRAM N/A 10/27/2023    Procedure: Coronary Angiogram;  Surgeon: Rob Lyles MD;  Location:  HEART CARDIAC CATH LAB    CV CORONARY ANGIOGRAM N/A 10/26/2023    Procedure: Coronary Angiogram;  Surgeon: Rob Lyles MD;  Location:  HEART CARDIAC CATH LAB    CV LEFT HEART CATH N/A 10/26/2023    Procedure: Left Heart Catheterization;  Surgeon: Rob Lyles MD;  Location:  HEART CARDIAC CATH LAB    CV PCI N/A 10/27/2023    Procedure: Percutaneous Coronary Intervention;  Surgeon: Rob Lyles MD;  Location:  HEART CARDIAC CATH LAB    CV PCI STENT DRUG ELUTING N/A 10/26/2023    Procedure: Percutaneous Coronary Intervention Stent;  Surgeon: Rob Lyles MD;  Location:  HEART CARDIAC CATH LAB    CV RIGHT HEART CATH MEASUREMENTS RECORDED N/A 5/6/2024    Procedure: Heart Cath Right Heart Cath;  Surgeon: Rob Lyles MD;  Location:  HEART CARDIAC CATH LAB    CV RIGHT HEART CATH MEASUREMENTS RECORDED N/A 9/3/2024    Procedure: Right Heart Catheterization;  Surgeon: Aaron Mesa MD;  Location:  HEART CARDIAC CATH LAB    CV RIGHT HEART CATH MEASUREMENTS  RECORDED N/A 10/24/2024    Procedure: Right Heart Catheterization;  Surgeon: Haile Braden MD;  Location:  HEART CARDIAC CATH LAB    EP ICD INSERT SINGLE N/A 2024    Procedure: Implantable Cardioverter Defibrillator Device & Lead Implant Dual;  Surgeon: Guero Black MD;  Location:  HEART CARDIAC CATH LAB    INJECTION, HYDROGEL SPACER N/A 2024    Procedure: INJECTION, HYDROGEL SPACER AND FIDUCIAL MARKER PLACEMENT;  Surgeon: Stanislaw Barros MD;  Location: [facility]    INSERT VENTRICULAR ASSIST DEVICE LEFT (HEARTMATE II) N/A 2024    Procedure: Median Sternotomy, INSERTION of LEFT VENTRICULAR ASSIST DEVICE (HEARTMATE III), cardiopulmonary bypass, transesophageal echocardiogram performed by anesthesia.;  Surgeon: Mulvihill, Michael, MD;  Location: [facility]    IRRIGATION AND DEBRIDEMENT CHEST WASHOUT, COMBINED Right 2024    Procedure: Exploration of chest, chest washout;  Surgeon: Mulvihill, Michael, MD;  Location: [facility]       FAMILY HISTORY:  Family History   Problem Relation Age of Onset    Other Cancer Mother     Obesity Mother     GI problems Father     Uterine Cancer Sister        SOCIAL HISTORY:  Social History     Socioeconomic History    Marital status:      Spouse name: None    Number of children: None    Years of education: None    Highest education level: None   Tobacco Use    Smoking status: Former     Current packs/day: 0.00     Average packs/day: 0.3 packs/day for 30.0 years (7.5 ttl pk-yrs)     Types: Cigarettes     Start date: 10/26/1993     Quit date: 10/26/2023     Years since quittin.0     Passive exposure: Past    Smokeless tobacco: Never    Tobacco comments:     Quit 10/26/2023   Vaping Use    Vaping status: Never Used   Substance and Sexual Activity    Alcohol use: Yes     Comment: 1-2 beers per day    Drug use: No    Sexual activity: Yes     Partners: Female     Birth control/protection: None   Other Topics Concern    Parent/sibling w/ CABG, MI  or angioplasty before 65F 55M? No     Social Drivers of Health     Financial Resource Strain: Low Risk  (10/23/2024)    Financial Resource Strain     Within the past 12 months, have you or your family members you live with been unable to get utilities (heat, electricity) when it was really needed?: No   Food Insecurity: Low Risk  (10/23/2024)    Food Insecurity     Within the past 12 months, did you worry that your food would run out before you got money to buy more?: No     Within the past 12 months, did the food you bought just not last and you didn t have money to get more?: No   Transportation Needs: Low Risk  (10/23/2024)    Transportation Needs     Within the past 12 months, has lack of transportation kept you from medical appointments, getting your medicines, non-medical meetings or appointments, work, or from getting things that you need?: No   Physical Activity: Inactive (11/16/2023)    Exercise Vital Sign     Days of Exercise per Week: 0 days     Minutes of Exercise per Session: 0 min   Social Connections: Unknown (11/16/2023)    Social Connection and Isolation Panel [NHANES]     Marital Status:    Interpersonal Safety: Low Risk  (10/23/2024)    Interpersonal Safety     Do you feel physically and emotionally safe where you currently live?: Yes     Within the past 12 months, have you been hit, slapped, kicked or otherwise physically hurt by someone?: No     Within the past 12 months, have you been humiliated or emotionally abused in other ways by your partner or ex-partner?: No   Recent Concern: Interpersonal Safety - High Risk (10/17/2024)    Interpersonal Safety     Do you feel physically and emotionally safe where you currently live?: No     Within the past 12 months, have you been hit, slapped, kicked or otherwise physically hurt by someone?: No     Within the past 12 months, have you been humiliated or emotionally abused in other ways by your partner or ex-partner?: No   Housing Stability: Low  Risk  (10/23/2024)    Housing Stability     Do you have housing? : Yes     Are you worried about losing your housing?: No   Recent Concern: Housing Stability - High Risk (9/1/2024)    Housing Stability     Do you have housing? : No     Are you worried about losing your housing?: No       MEDICATIONS:  Current Outpatient Medications   Medication Sig Dispense Refill    acetaminophen (TYLENOL) 325 MG tablet Take 2 tablets (650 mg) by mouth every 4 hours as needed for other (For optimal non-opioid multimodal pain management to improve pain control.).      amiodarone (PACERONE) 200 MG tablet Take 1 tablet (200 mg) by mouth daily. 90 tablet 3    amLODIPine (NORVASC) 10 MG tablet Take 1 tablet (10 mg) by mouth daily. 90 tablet 3    atorvastatin (LIPITOR) 80 MG tablet Take 1 tablet (80 mg) by mouth every evening. 90 tablet 3    digoxin (LANOXIN) 62.5 MCG tablet Take 1 tablet (62.5 mcg) by mouth daily. 30 tablet 11    escitalopram (LEXAPRO) 10 MG tablet Take 1 tablet (10 mg) by mouth or Feeding Tube daily. 90 tablet 3    gabapentin (NEURONTIN) 100 MG capsule Take 1 capsule (100 mg) by mouth or Feeding Tube at bedtime. 90 capsule 3    hydrALAZINE (APRESOLINE) 25 MG tablet Take 1 tablet (25 mg) by mouth 3 times daily. 180 tablet 3    losartan (COZAAR) 50 MG tablet Take 25mg in the AM and 50mg in the PM      magnesium hydroxide (MILK OF MAGNESIA) 400 MG/5ML suspension Take 30 mLs by mouth daily as needed for constipation (Use if polyethylene glycol (Miralax) is not effective after 24 hours.). 354 mL 0    magnesium oxide (MAG-OX) 400 MG tablet Take 1 tablet (400 mg) by mouth daily. 90 tablet 3    melatonin 10 MG TABS tablet Take 1 tablet (10 mg) by mouth every evening.      multivitamin w/minerals (THERA-VIT-M) tablet Take 1 tablet by mouth daily. 90 tablet 3    warfarin ANTICOAGULANT (COUMADIN) 2.5 MG tablet Take 1 tablet (2.5 mg) by mouth every evening. 90 tablet 3     No current facility-administered medications for this  visit.        ALLERGIES:     Allergies   Allergen Reactions    Brilinta [Ticagrelor] Other (See Comments) and Difficulty breathing     Per pt and spouse, hyperventilation.    Clonazepam Other (See Comments)     Per spouse, acted like a zombie and he was shaky, could barely talk.       PHYSICAL EXAM:  BP (!) 58/0 (BP Location: Left arm, Patient Position: Chair, Cuff Size: Adult Small)   Wt 58.1 kg (128 lb)   SpO2 98%   BMI 20.05 kg/m      Gen: alert, interactive, NAD  Neck: supple, no JVD  CV: VAD hum+  Chest: CTAB, no wheezes or crackles  Abd: soft, NT, ND, +BS, driveline dressing in place  Ext: no LE edema    LABS:    CMP  Last Comprehensive Metabolic Panel:  Sodium   Date Value Ref Range Status   11/26/2024 127 (L) 135 - 145 mmol/L Final   05/26/2021 141 133 - 144 mmol/L Final     Sodium Whole Blood   Date Value Ref Range Status   10/27/2023 138 135 - 145 mmol/L Final     Potassium   Date Value Ref Range Status   11/26/2024 4.3 3.4 - 5.3 mmol/L Final   05/26/2021 4.5 3.4 - 5.3 mmol/L Final     Potassium Whole Blood   Date Value Ref Range Status   09/23/2024 2.5 (LL) 3.4 - 5.3 mmol/L Final     Potassium POCT   Date Value Ref Range Status   10/23/2024 3.8 3.4 - 5.3 mmol/L Final     Chloride   Date Value Ref Range Status   11/26/2024 94 (L) 98 - 107 mmol/L Final   05/26/2021 105 94 - 109 mmol/L Final     Chloride Whole Blood   Date Value Ref Range Status   10/27/2023 103 94 - 109 mmol/L Final     Carbon Dioxide   Date Value Ref Range Status   05/26/2021 29 20 - 32 mmol/L Final     Carbon Dioxide (CO2)   Date Value Ref Range Status   11/26/2024 23 22 - 29 mmol/L Final     Carbon Dioxide Whole Blood   Date Value Ref Range Status   10/27/2023 27 20 - 32 mmol/L Final     Anion Gap   Date Value Ref Range Status   11/26/2024 10 7 - 15 mmol/L Final   05/26/2021 7 3 - 14 mmol/L Final     Glucose   Date Value Ref Range Status   11/26/2024 109 (H) 70 - 99 mg/dL Final   05/26/2021 125 (H) 70 - 99 mg/dL Final     GLUCOSE BY  "METER POCT   Date Value Ref Range Status   11/09/2024 111 (H) 70 - 99 mg/dL Final     Urea Nitrogen   Date Value Ref Range Status   11/26/2024 32.4 (H) 8.0 - 23.0 mg/dL Final   05/26/2021 41 (H) 7 - 30 mg/dL Final     Creatinine   Date Value Ref Range Status   11/26/2024 0.72 0.67 - 1.17 mg/dL Final   05/26/2021 0.80 0.66 - 1.25 mg/dL Final     GFR Estimate   Date Value Ref Range Status   11/26/2024 >90 >60 mL/min/1.73m2 Final     Comment:     eGFR calculated using 2021 CKD-EPI equation.   05/26/2021 90 >60 mL/min/[1.73_m2] Final     Comment:     Non  GFR Calc  Starting 12/18/2018, serum creatinine based estimated GFR (eGFR) will be   calculated using the Chronic Kidney Disease Epidemiology Collaboration   (CKD-EPI) equation.       GFR, ESTIMATED POCT   Date Value Ref Range Status   10/23/2024 >60 >60 mL/min/1.73m2 Final     Calcium   Date Value Ref Range Status   11/26/2024 9.0 8.8 - 10.4 mg/dL Final     Comment:     Reference intervals for this test were updated on 7/16/2024 to reflect our healthy population more accurately. There may be differences in the flagging of prior results with similar values performed with this method. Those prior results can be interpreted in the context of the updated reference intervals.   05/26/2021 8.3 (L) 8.5 - 10.1 mg/dL Final     Bilirubin Total   Date Value Ref Range Status   11/26/2024 0.4 <=1.2 mg/dL Final   03/20/2018 0.5 0.2 - 1.3 mg/dL Final     Alkaline Phosphatase   Date Value Ref Range Status   11/26/2024 121 40 - 150 U/L Final   03/20/2018 82 40 - 150 U/L Final     ALT   Date Value Ref Range Status   11/26/2024 48 0 - 70 U/L Final   03/20/2018 22 0 - 70 U/L Final     AST   Date Value Ref Range Status   11/26/2024 43 0 - 45 U/L Final   03/20/2018 20 0 - 45 U/L Final       NT-proBNP       LDH      TROP  No results found for: \"TROPI\", \"TROPONIN\", \"TROPR\", \"TROPN\"    CBC  CBC RESULTS:   Recent Labs   Lab Test 11/13/24  0910   WBC 5.4   RBC 3.49*   HGB 10.6* "   HCT 31.3*   MCV 90   MCH 30.4   MCHC 33.9   RDW 15.1*          LIPIDS  Recent Labs   Lab Test 09/04/24  0626 08/31/24  0414   CHOL 85 83   HDL 29* 25*   LDL 45 46   TRIG 56 59       TSH  TSH   Date Value Ref Range Status   09/04/2024 2.45 0.30 - 4.20 uIU/mL Final       HBA1C  Lab Results   Component Value Date    A1C 5.4 09/18/2024    A1C 4.8 03/13/2023         CARDIAC DATA:    EKG:  10/23/24: Atrial fibrillation, Left anterior fascicular block, Possible Lateral infarct , age undetermined     Echo:  10/23/24  LVAD cannula was seen in the expected anatomic position in the LV apex.  HM3 at 5100RPM.  LVIDd 47mm.  Septum normal.  Aortic valve open intermittently. No AI.  Normal flow velocities.  Global right ventricular function is mildly to moderately reduced.  Small circumferential pericardial effusion is present without any hemodynamic significance.    Cardiac Catheterization:  RHC 10/23/24  BSA 1.75 m2  /89/101 mmHg  RA --/--/6 mmHg  RV 30/3mmHg  PA 30/14/20 mmHg  PCWP --/--/9 mmHg  TD CO/CI 4.1/2.34   Goldy CO/CI 4.54/2.59   PVR 2.68 (TD)  PA sat 65%   PCW Oximeter sat 95%  Hgb 10.5 g/dL Right sided filling pressures are normal. Left sided filling pressures are normal. Normal PA pressures. Normal cardiac output level.       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 NYHA Class III.     10/23/24 RA 6, PA 40/14/20, PCW 9, Goldy CO/CI 4.54/2.59 at 5100 rpm     Fluid status: likely hypo-volemic, and is now on an aggressive oral hydration regimen.  Advised to increase fluid intake to >120 ounce per day, mainly Gatorade/water, avoid soda,   ACEi/ARB/ARNi: losartan 25 mg AM  and 50 mg PM, with persistent dizziness and hypotension, will lower dose to 25 mg PM  Vasodilator: hydralazine 25 mg q8h.  amlodipine 10 mg daily --> may need further reduction in the future  BB: deferred due to recent VAD  Aldosterone antagonist: STOPPED blane due to hypoNa  SGLT2i: STOPPED empagliflozin 10 mg due to propensity for hypovolemia    Also advised to increase salt intake in the food, salty snacks 3 times per day and increase added salt to the food.  As a next step may also need to consider adding salt tablets.  We discussed that all of this is the opposite of what we do for heart failure patients, but since his LVAD implant, he has robust urinary output and is auto diuresing, and with persistent low flow alarms, speed drops, dizziness, hypotension -would benefit from increased fluid and salt intake.  We discussed that none of these are perfect and may need continued adjustments and close monitoring based on his clinical status.    We also discussed that clinic visit should be inpatient to allow for optimal care and that we would continue to advise ER evaluation for concerning symptoms.    SCD prophylaxis: ICD  MAP: goal 70-90, encouraging POs ulitize hold parameters  LDH trends: stable 200s  Anticoagulation: warfarin dosing per pharmacy, INR goal 2-3  Antiplatelet: ASA not indicated in LVAD population, > 6 months s/p STEMI    Other:   - No fluid restriction, encourage drinks with Na/electrolytes, increase fluid intake to greater than 120 ounce per day, eat frequent salty snacks  - Patient has a low flow controller, will not alarm until flow <2. Hematocrit is set to match his true hematocrit  - Dr. Smith has reviewed inflow and outflow positioning, nothing to do       Left ventricular assist device interrogation: The patient's HeartMate III LVAD was interrogated today 11/26/24 .     I have personally interrogated the LVAD, reviewing all parameters and alarm trends as noted in the note.     Patient is  hypovolemic     Exam otherwise as noted in the note    Heartmate 3 LEFT VS  Flow (Lpm): 3.5 Lpm  Pulse Index (PI): 2.8 PI  Speed (rpm): 4800 rpm  Power (jackson): 3 jackson  Current Hct settin     Alarms were reviewed, and notable for speed drops, low flow alarms.  The driveline exit site was inspected, and showed no concerns.  All external components were inspected and showed no evidence of damage or malfunction.  No components were replaced.  No changes to VAD settings made       # Low flow alarms  # Elevated PIs  # Labile MAPs   # Suspected orthostatic hypotension, resolved  Speed optimization echo performed 10/1, speed decreased to 5100. Low flows seems releated to hypertension and some hypovolemia as well. Have improved with re-timing hydralazine and after IV fluids. CT-A was done on  10/17/24 with no outflow graft ostruction. The outflow graft does make an acute angle as it approaches the aorta, discussed with Dr. Valentine, unlikely to be impacting the low flows at this time.  Having daily low flow alarms, always in the setting of htn. Mostly ~5 am, although has had some in the evening as well. They do not seem to be positional. RHC with reasonable filling pressures as above. He has been asymptomatic.  - Decreased speed to 5000 on 10/27  - Losartan dose reduced today  - Hydralazine and amlodipine as above  - Stopped fludrocortisone 0.1 mg daily   - Increase PO intake as above, specifically right before bed, non free-water sources due to hypoNa  - Temp drop in hematocrit on monitor 10/29-10/30, low-flow controller change out 10/30, changed hematocrit back 10/31     # History of VT/VF arrest   # AFib s/p DCCV 2024 and again 2024   Successfully cardioverted in early September for AF. Since then EKG suggested AF on . Tele is only available from as early as . The patient was started on hep gtt , but unclear if was in AF before this. While the patient was on hep gtt until INR was therapeutic, it  "is not possible to assess rhythm prior to 9/21. Systemic AC was off on 9/18-9/20. S/p MELBA and DCCV on 9/30 with conversion to sinus rhythm.  10/23 device interrogation shows persistent episode of AF since 10/10 with rates   - Continue amiodarone 200 mg daily  - S/p digoxin load, continue digoxin 62.5 mcg daily   - Ongoing a fib- given ongoing low flows- deferring cardioversion for now given reasonable rate control.   - AC as above     # Hypovolemia hyponatremia, stable  Na 127 at discharge, remains hyponatremic  - encourage high sodium PO intake/gatorade and salty foods     # Visual changes, resolved.   # Generalized weakness, improved   # Headache, resolved  Stoke code called 9/29 for visual changes - right eye with decreased upper half of vision and bluish haze. Resolved after 30 minutes. Seen by opthalmology and neuro. Negative head CT and CTA head and neck. He has had 3 episodes which last about 5 minutes before resolving completely.      On 10/9 Stroke code activated due to concern of generalized weakness, numbness and headache. LKW reported as 0715 when he worked with therapies and shortly after started feeling generally weak. Then around 1500 he had onset of moderate \"tension\" type headache and tingling in the bilateral fingertips. Per RRT URIAH patient now reports tingling has resolved and headache is improving. MAP 48, INR 2.2. Given absence of focal deficits and rapidly improving symptoms, opted to cancel stroke.  CT head negative, though did show chronic maxillary sinusitis.      Stroke code called 10/23 when seen in clinic with report of new RUE weakness. He was notably hypotensive at this time with MAP ~45 with new right pronator drift. Evaluated in ED, CT and CTA head and neck negative. RUE weakness resolved.   - monitor for changes- no current symptoms        # Cognitive changes  - Formal neuropsych testing this admission reveiling some cognitive changes  - 24/7 care during his first 30 days, will " likely not need therafter, but continue to assess with each LVAD appointment (per Ho Méndez note from 11/8 Duane has passed his VAD education)  - Needs formal driving assessment with DMV or OT prior to being cleared to drive, wife aware and also discussed with patient at time of discharge  - Repeat neuropsych testing in 1 year  - Referred to behavioral neurology in place      I spent 62 minutes in care of the patient today including obtaining recent medical history, personally reviewing recent cardiac testing and/or lab results, today's examination, discussion of testing results and care recommendations with patient.       Thank you for the opportunity to participate in this patient's care. Please feel free to reach out with any questions or concerns.      Ham Cruz MD, FACC  Associate Professor of Medicine  Advanced Heart Failure, LVAD & Cardiac Transplant  Director, Cardio-Obstetrics  Cardio-Oncology  [facility]

## 2024-11-26 NOTE — PATIENT INSTRUCTIONS
" Ventricular Assist Device Clinic  Take your medications every day, as directed!  Medication changes made today:  DECREASE Losartan to 25mg every night starting on 11/27- tomorrow  Instructions:  INCREASE salt intake, have 3 salty snacks every day   INCREASE fluid intake to 120oz Monitor your heart failure, Page the VAD Coordinator on call:  If you gain more than 3 lb in a day or 5 in a week  If you feel worsening shortness of breath, palpitations, or swelling.   If you have VAD alarms or change in parameters  If you feel dizzy or lightheaded     Keep your VAD appointments!    Your next appointment is 12/11 with Anitha       Please see the clinic schedulers after your appointment to schedule follow-up.    If you do not have an appointment scheduled, you need to call the VAD  at [phone]. To Page the VAD Coordinator on call, dial [phone] option #4 and ask to speak to the VAD coordinator on call. Try to maintain a heart healthy lifestyle!  Limit salt & sodium to 2000mg/day   Eat a heart healthy diet, low in saturated fats  Stay active! Aim to move at least 150 minutes every week.    Use [employer] allows you to communicate directly with your heart team through secure messaging.  [employer] can be accessed any time on your phone, computer, or tablet.  If you need assistance, we'd be happy to help!      Equipment Reminders:   Charge your back-up controller at least every 6 months. To charge, connect it to either batteries or wall power. The screen will read \"Charging\". Keep connected until the screen reads \"charging complete\". Disconnect power once the controller battery is charged. Also do a self-test when the controller is connected to power.  Replace the AA batteries in your mobile power unit every 6 months.  Check your battery charger for when it is due for maintenance. It requires inspection in clinic once per year. There will be a sticker stating the month and year maintenance is due. When " you bring your battery charger to clinic, tell one of the schedulers you have LVAD equipment that needs maintenance. They will call [facility]. You can leave your  under the LVAD sign by the  at the far end of clinic. You must drop it off before 2pm.

## 2024-11-26 NOTE — PROGRESS NOTES
ANTICOAGULATION MANAGEMENT     Duane C Johnson 74 year old male is on warfarin with therapeutic INR result. (Goal INR 2.0-3.0)    Recent labs: (last 7 days)     11/26/24  0746   INR 2.49*       ASSESSMENT     Source(s): Chart Review and Patient/Caregiver Call     Warfarin doses taken: Warfarin taken as instructed  Diet: Protein supplement/shake decreased which maybe affecting INR-has decreased from 3 Ensure Enlive daily to drinking it occasionally and inconsistently-not on a daily basis.   Medication/supplement changes:  DECREASE Losartan to 25mg every night starting on 11/27  New illness, injury, or hospitalization: No  Signs or symptoms of bleeding or clotting: No  Previous result: Therapeutic last 2(+) visits  Additional findings:   Cards OV today:  DECREASE Losartan to 25mg every night starting on 11/27- tomorrow,   INCREASE salt intake, have 3 salty snacks every day   INCREASE fluid intake to 120oz    [facility] consult (Amiodarone): 11/12/24: amiodarone started (NOT iv loaded)   -hospital gave amiodarone 8/31-11/9   -stable despite our decreases. Let's keep maintenance dose today. recheck next Mon/Tue.       PLAN     Recommended plan for ongoing change(s) affecting INR     Dosing Instructions: Continue your current warfarin dose with next INR in 1 week       Summary  As of 11/26/2024      Full warfarin instructions:  1.25 mg every Mon, Wed, Fri; 2.5 mg all other days   Next INR check:  12/3/2024               Telephone call with spouse, Melissa who agrees to plan and repeated back plan correctly    Lab visit scheduled    Education provided: Goal range and lab monitoring: goal range and significance of current result, Importance of following up at instructed interval, and frequency of lab work when starting warfarin and importance of following up when instructed (extends after stability established)  Dietary considerations: Impact of protein intake on INR   Interaction IS anticipated between warfarin and Amiodarone  (starting/stopping)  Contact [phone] with any changes, questions or concerns.     Plan made with [facility] Pharmacist Sindy Burnette and per LVAD protocol    CANDY LOYOLA RN  11/26/2024  Anticoagulation Clinic  [facility] for routing messages: susannah ANTICOAG LVAD  [facility] patient phone line: [phone]        _______________________________________________________________________     Anticoagulation Episode Summary       Current INR goal:  2.0-3.0   TTR:  100.0% (1.7 wk)   Target end date:  Indefinite   Send INR reminders to:  ANTICOAG LVAD    Indications    Chronic systolic congestive heart failure (H) [I50.22]  Anticoagulated on warfarin [Z79.01]  LVAD (left ventricular assist device) present (H) [Z95.811]  Other specified hypotension [I95.89]  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

## 2024-11-27 ENCOUNTER — CARE COORDINATION (OUTPATIENT)
Dept: CARDIOLOGY | Facility: CLINIC | Age: 74
End: 2024-11-27
Payer: MEDICARE

## 2024-11-27 ENCOUNTER — TELEPHONE (OUTPATIENT)
Dept: CARDIOLOGY | Facility: CLINIC | Age: 74
End: 2024-11-27

## 2024-11-27 NOTE — TELEPHONE ENCOUNTER
D:  Pt has recent history of dizziness.  I:  I spoke with the patient and his wife and informed them that Dr. Cruz asked that he stop his losartan completely.  They clarified that this was instead of just reducing the dose, which I confirmed.  They understood and agreed to do so.  I asked them to call back if they had questions or concerns.    A:  Dizziness possibly related to hypotension.  P:  Follow up in clinic

## 2024-11-27 NOTE — PROGRESS NOTES
Sharon called to say that Duane had two dizziness episodes today in which he needed to lower himself to the floor to prevent himself from falling. The dizzyness happens when he gets up slowly, stands there for a minute or two and then starts walking. His MAP today was 70. His losartan was held for a day by Dr Cruz and then tonight he will restart 25mg daily in the evening.    I told them if he hits his head or passes out, he should come to the ED on the [place]. In encouraged more fluids, more Gatorade, and more salty snacks/french fries, etc. Melissa agreed that they will come to the ED if he passes out or hits his head.

## 2024-12-02 ENCOUNTER — CARE COORDINATION (OUTPATIENT)
Dept: CARDIOLOGY | Facility: CLINIC | Age: 74
End: 2024-12-02
Payer: MEDICARE

## 2024-12-02 DIAGNOSIS — I50.22 CHRONIC SYSTOLIC CONGESTIVE HEART FAILURE (H): Primary | ICD-10-CM

## 2024-12-02 DIAGNOSIS — Z79.01 ANTICOAGULATED ON WARFARIN: ICD-10-CM

## 2024-12-02 DIAGNOSIS — Z95.811 LVAD (LEFT VENTRICULAR ASSIST DEVICE) PRESENT (H): ICD-10-CM

## 2024-12-02 NOTE — PROGRESS NOTES
Called patient/caregiver to check in 3 weeks post discharge from VAD implant hospitilization. Pt reports VAD parameters stable and baeline: 5000, 3.4 LPM, 3.6 PI and 3.2 jackson and weight slowly climbing now at 132.1 lbs. Duane said he is eating more and better and is maybe slowly putting on good weight. Reviewed medications and answered any questions. Patient reports sleeping well and no anxiety since being home with LVAD. Patient is fully able to move around the house and care for himself independently.     Discussed specific new problems/stressors since being discharged from the hospital: the dizzyness is becoming less frequent.  Empathized with patient and reviewed coping strategies: enlisting support from friends and love ones, attending patient and caregiver support groups, reviewing LVAD educational materials to reinforce knowledge, and talking about concerns with family/care providers/trusted others. Encouraged pt to page VAD Coordinator with any issues or questions. Pt verbalizes understanding.    I called Duane to follow up on his dizzyness episodes and low flow alarms last week. Dr Cruz dc'd his losartan. Duane reports feeling pretty good over the weekend. His MAP is 73.    I encouraged Duane to call with any questions or concerns.

## 2024-12-03 ENCOUNTER — CARE COORDINATION (OUTPATIENT)
Dept: CARDIOLOGY | Facility: CLINIC | Age: 74
End: 2024-12-03

## 2024-12-03 DIAGNOSIS — Z95.811 LVAD (LEFT VENTRICULAR ASSIST DEVICE) PRESENT (H): ICD-10-CM

## 2024-12-03 DIAGNOSIS — Z79.01 ANTICOAGULATED ON WARFARIN: ICD-10-CM

## 2024-12-03 DIAGNOSIS — I95.9 TRANSIENT HYPOTENSION: ICD-10-CM

## 2024-12-03 DIAGNOSIS — I95.89 OTHER SPECIFIED HYPOTENSION: ICD-10-CM

## 2024-12-03 RX ORDER — AMLODIPINE BESYLATE 10 MG/1
5 TABLET ORAL DAILY
Qty: 45 TABLET | Refills: 3 | Status: SHIPPED | OUTPATIENT
Start: 2024-12-03

## 2024-12-04 ENCOUNTER — CARE COORDINATION (OUTPATIENT)
Dept: CARDIOLOGY | Facility: CLINIC | Age: 74
End: 2024-12-04
Payer: MEDICARE

## 2024-12-04 VITALS — BODY MASS INDEX: 20.86 KG/M2 | WEIGHT: 133.2 LBS

## 2024-12-04 NOTE — PROGRESS NOTES
D: Called patient to follow up on symptoms after speaking with on-call coordinator yesterday.     I/A: patient states he is feeling well this morning.   Had low flow ~ 7 am today was dizzy and lightheaded- not as bad an normal, was standing about to take am pills. He said it was very brief, by the time he looked at controller flow was back to 3. He said it was not so bad as yesterday, he had to sit down but did not have to go to the floor.   Drinking 4 32 oz. Eating cheese its, chips, cheetos. Weight this am 133.2lb   Has not taken his map yet this am- reminded him to call back if under 70.     P: Reminded patient to bring doppler & log book to clinic on Friday. Will discuss with Dr. Cruz.  Patient, Family notified to page on-call coordinator if symptoms worsen or with other concerns. Patient, Family verbalized understanding.

## 2024-12-05 ENCOUNTER — CARE COORDINATION (OUTPATIENT)
Dept: CARDIOLOGY | Facility: CLINIC | Age: 74
End: 2024-12-05
Payer: MEDICARE

## 2024-12-05 DIAGNOSIS — Z79.01 ANTICOAGULATED ON WARFARIN: ICD-10-CM

## 2024-12-05 DIAGNOSIS — Z95.811 LVAD (LEFT VENTRICULAR ASSIST DEVICE) PRESENT (H): ICD-10-CM

## 2024-12-05 DIAGNOSIS — I50.22 CHRONIC SYSTOLIC CONGESTIVE HEART FAILURE (H): Primary | ICD-10-CM

## 2024-12-06 ENCOUNTER — LAB (OUTPATIENT)
Dept: LAB | Facility: CLINIC | Age: 74
End: 2024-12-06
Payer: MEDICARE

## 2024-12-06 DIAGNOSIS — Z95.811 LVAD (LEFT VENTRICULAR ASSIST DEVICE) PRESENT (H): ICD-10-CM

## 2024-12-06 DIAGNOSIS — I50.22 CHRONIC SYSTOLIC CONGESTIVE HEART FAILURE (H): ICD-10-CM

## 2024-12-06 DIAGNOSIS — Z79.01 ANTICOAGULATED ON WARFARIN: ICD-10-CM

## 2024-12-06 LAB
ALBUMIN SERPL BCG-MCNC: 3.8 G/DL (ref 3.5–5.2)
ALP SERPL-CCNC: 110 U/L (ref 40–150)
ALT SERPL W P-5'-P-CCNC: 45 U/L (ref 0–70)
ANION GAP SERPL CALCULATED.3IONS-SCNC: 8 MMOL/L (ref 7–15)
AST SERPL W P-5'-P-CCNC: 41 U/L (ref 0–45)
BILIRUB SERPL-MCNC: 0.4 MG/DL
BUN SERPL-MCNC: 12.6 MG/DL (ref 8–23)
CALCIUM SERPL-MCNC: 8.8 MG/DL (ref 8.8–10.4)
CHLORIDE SERPL-SCNC: 97 MMOL/L (ref 98–107)
CREAT SERPL-MCNC: 0.74 MG/DL (ref 0.67–1.17)
EGFRCR SERPLBLD CKD-EPI 2021: >90 ML/MIN/1.73M2
ERYTHROCYTE [DISTWIDTH] IN BLOOD BY AUTOMATED COUNT: 14.6 % (ref 10–15)
GLUCOSE SERPL-MCNC: 97 MG/DL (ref 70–99)
HCO3 SERPL-SCNC: 26 MMOL/L (ref 22–29)
HCT VFR BLD AUTO: 33.5 % (ref 40–53)
HGB BLD-MCNC: 11.1 G/DL (ref 13.3–17.7)
INR PPP: 2.61 (ref 0.85–1.15)
LDH SERPL L TO P-CCNC: 266 U/L (ref 0–250)
MCH RBC QN AUTO: 29.8 PG (ref 26.5–33)
MCHC RBC AUTO-ENTMCNC: 33.1 G/DL (ref 31.5–36.5)
MCV RBC AUTO: 90 FL (ref 78–100)
NT-PROBNP SERPL-MCNC: 2893 PG/ML (ref 0–900)
PLATELET # BLD AUTO: 166 10E3/UL (ref 150–450)
POTASSIUM SERPL-SCNC: 4.2 MMOL/L (ref 3.4–5.3)
PROT SERPL-MCNC: 7 G/DL (ref 6.4–8.3)
RBC # BLD AUTO: 3.72 10E6/UL (ref 4.4–5.9)
SODIUM SERPL-SCNC: 131 MMOL/L (ref 135–145)
WBC # BLD AUTO: 5 10E3/UL (ref 4–11)

## 2024-12-06 PROCEDURE — 80053 COMPREHEN METABOLIC PANEL: CPT | Performed by: PATHOLOGY

## 2024-12-06 PROCEDURE — 83615 LACTATE (LD) (LDH) ENZYME: CPT | Performed by: PATHOLOGY

## 2024-12-06 PROCEDURE — 85027 COMPLETE CBC AUTOMATED: CPT | Performed by: PATHOLOGY

## 2024-12-06 PROCEDURE — 85610 PROTHROMBIN TIME: CPT | Performed by: PATHOLOGY

## 2024-12-06 PROCEDURE — 36415 COLL VENOUS BLD VENIPUNCTURE: CPT | Performed by: PATHOLOGY

## 2024-12-06 PROCEDURE — 83880 ASSAY OF NATRIURETIC PEPTIDE: CPT | Performed by: PATHOLOGY

## 2024-12-09 ENCOUNTER — CARE COORDINATION (OUTPATIENT)
Dept: CARDIOLOGY | Facility: CLINIC | Age: 74
End: 2024-12-09
Payer: MEDICARE

## 2024-12-09 NOTE — PROGRESS NOTES
"Called patient/caregiver to check in 4 weeks post discharge from VAD implant hospitilization.     Pt reports VAD parameters    12/09/24 1041   Heartmate 3 LEFT VS   Flow (Lpm) 3.6 Lpm   Pulse Index (PI) 2.7 PI   Speed (rpm) 5000 rpm   Power (jackson) 3.2 jackson    and weight 135lb, denies swelling, shortness of breath.     Reviewed medications and answered any questions. Patient reports sleeping great despite getting up every 1-2 hours to go to the bathroom and denies anxiety since being home with LVAD. Patient is able to move around the house and care for himself  with walker at home. Will start cardiac rehab on 12/27.      Reports 2 beeps (low flow alarms) in the AM - was in bathroom 2 seconds on 7th, 1 second on 8th. Copake dizzy and lightheaded, sat back down and drank some more water. Denies falls/ passing out over the weekend. Endorses battery rotation in place and swapped out 4 on 12/1. Endorses filling out daily log book. Reinforced weekly cleaning of gold contacts. Doing daily every 2-3 days right now until weekly     Discussed specific new problems/stressors since being discharged from the hospital: Reviewed with patient that although his 30 days of caregiver are up due to his frequent alarms, \"passing out\" and concerns from neuropsychology we want him to continue to stick with wife sanjeev 24/7 until we have more stability and further testing to prove he is safe to be alone. Empathized with patient and reviewed coping strategies: enlisting support from friends and love ones, attending patient and caregiver support groups, reviewing LVAD educational materials to reinforce knowledge, and talking about concerns with family/care providers/trusted others. Encouraged pt to page VAD Coordinator with any issues or questions. Pt verbalizes understanding.   "

## 2024-12-12 DIAGNOSIS — I50.22 CHRONIC SYSTOLIC HEART FAILURE (H): ICD-10-CM

## 2024-12-12 DIAGNOSIS — Z95.811 LEFT VENTRICULAR ASSIST DEVICE PRESENT (H): ICD-10-CM

## 2024-12-12 DIAGNOSIS — Z79.01 ANTICOAGULATED ON WARFARIN: ICD-10-CM

## 2024-12-12 DIAGNOSIS — Z95.811 LVAD (LEFT VENTRICULAR ASSIST DEVICE) PRESENT (H): ICD-10-CM

## 2024-12-12 DIAGNOSIS — Z79.899 ENCOUNTER FOR LONG-TERM (CURRENT) USE OF MEDICATIONS: ICD-10-CM

## 2024-12-12 DIAGNOSIS — Z79.899 LONG TERM USE OF DRUG: ICD-10-CM

## 2024-12-12 DIAGNOSIS — I50.22 CHRONIC SYSTOLIC CONGESTIVE HEART FAILURE (H): Primary | ICD-10-CM

## 2024-12-16 ENCOUNTER — CARE COORDINATION (OUTPATIENT)
Dept: CARDIOLOGY | Facility: CLINIC | Age: 74
End: 2024-12-16

## 2024-12-16 NOTE — PROGRESS NOTES
D: Called patient to check in     I/A: Patient states no vad alarms. He reports fainting x 1 when he got up out of bed 2 days ago. Did not fully loose consciousness. Knocked upper lip onto window railing. Did not hit head. No alarms when it happened.  Date MAP   12/10 88   12/11 80   12/13 102   12/14 94   12/16 Pt states he will call back later with this              12/16/24 1221   Heartmate 3 LEFT VS   Flow (Lpm) 3.4 Lpm   Pulse Index (PI) 3.7 PI   Speed (rpm) 5000 rpm   Power (jackson) 3.2 jackson       P:  Patient notified to page on-call coordinator if symptoms worsen or with other concerns. Patient verbalized understanding.

## 2024-12-20 ENCOUNTER — ANCILLARY PROCEDURE (OUTPATIENT)
Dept: CARDIOLOGY | Facility: CLINIC | Age: 74
End: 2024-12-20
Attending: INTERNAL MEDICINE
Payer: MEDICARE

## 2024-12-20 ENCOUNTER — LAB (OUTPATIENT)
Dept: LAB | Facility: CLINIC | Age: 74
End: 2024-12-20
Payer: MEDICARE

## 2024-12-20 DIAGNOSIS — Z79.899 LONG TERM USE OF DRUG: ICD-10-CM

## 2024-12-20 DIAGNOSIS — I50.22 CHRONIC SYSTOLIC CONGESTIVE HEART FAILURE (H): ICD-10-CM

## 2024-12-20 DIAGNOSIS — Z79.01 ANTICOAGULATED ON WARFARIN: ICD-10-CM

## 2024-12-20 DIAGNOSIS — Z79.899 ENCOUNTER FOR LONG-TERM (CURRENT) USE OF MEDICATIONS: ICD-10-CM

## 2024-12-20 DIAGNOSIS — Z95.811 LVAD (LEFT VENTRICULAR ASSIST DEVICE) PRESENT (H): ICD-10-CM

## 2024-12-20 DIAGNOSIS — I50.22 CHRONIC SYSTOLIC HEART FAILURE (H): ICD-10-CM

## 2024-12-20 DIAGNOSIS — Z95.811 LEFT VENTRICULAR ASSIST DEVICE PRESENT (H): ICD-10-CM

## 2024-12-20 LAB
ALBUMIN SERPL BCG-MCNC: 3.6 G/DL (ref 3.5–5.2)
ALP SERPL-CCNC: 119 U/L (ref 40–150)
ALT SERPL W P-5'-P-CCNC: 34 U/L (ref 0–70)
ANION GAP SERPL CALCULATED.3IONS-SCNC: 10 MMOL/L (ref 7–15)
AST SERPL W P-5'-P-CCNC: 34 U/L (ref 0–45)
BILIRUB SERPL-MCNC: 0.3 MG/DL
BUN SERPL-MCNC: 16.5 MG/DL (ref 8–23)
CALCIUM SERPL-MCNC: 8.9 MG/DL (ref 8.8–10.4)
CHLORIDE SERPL-SCNC: 97 MMOL/L (ref 98–107)
CREAT SERPL-MCNC: 0.73 MG/DL (ref 0.67–1.17)
EGFRCR SERPLBLD CKD-EPI 2021: >90 ML/MIN/1.73M2
ERYTHROCYTE [DISTWIDTH] IN BLOOD BY AUTOMATED COUNT: 14.7 % (ref 10–15)
GLUCOSE SERPL-MCNC: 98 MG/DL (ref 70–99)
HCO3 SERPL-SCNC: 24 MMOL/L (ref 22–29)
HCT VFR BLD AUTO: 30.9 % (ref 40–53)
HGB BLD-MCNC: 10.6 G/DL (ref 13.3–17.7)
INR PPP: 2.05 (ref 0.85–1.15)
LDH SERPL L TO P-CCNC: 216 U/L (ref 0–250)
Lab: NORMAL
MAGNESIUM SERPL-MCNC: 1.8 MG/DL (ref 1.7–2.3)
MCH RBC QN AUTO: 30.3 PG (ref 26.5–33)
MCHC RBC AUTO-ENTMCNC: 34.3 G/DL (ref 31.5–36.5)
MCV RBC AUTO: 88 FL (ref 78–100)
MDC_IDC_EPISODE_DTM: NORMAL
MDC_IDC_EPISODE_ID: NORMAL
MDC_IDC_EPISODE_TYPE: NORMAL
MDC_IDC_LEAD_CONNECTION_STATUS: NORMAL
MDC_IDC_LEAD_CONNECTION_STATUS: NORMAL
MDC_IDC_LEAD_IMPLANT_DT: NORMAL
MDC_IDC_LEAD_IMPLANT_DT: NORMAL
MDC_IDC_LEAD_LOCATION: NORMAL
MDC_IDC_LEAD_LOCATION: NORMAL
MDC_IDC_LEAD_LOCATION_DETAIL_1: NORMAL
MDC_IDC_LEAD_LOCATION_DETAIL_1: NORMAL
MDC_IDC_LEAD_MFG: NORMAL
MDC_IDC_LEAD_MFG: NORMAL
MDC_IDC_LEAD_MODEL: NORMAL
MDC_IDC_LEAD_MODEL: NORMAL
MDC_IDC_LEAD_POLARITY_TYPE: NORMAL
MDC_IDC_LEAD_POLARITY_TYPE: NORMAL
MDC_IDC_LEAD_SERIAL: NORMAL
MDC_IDC_LEAD_SERIAL: NORMAL
MDC_IDC_LEAD_SPECIAL_FUNCTION: NORMAL
MDC_IDC_LEAD_SPECIAL_FUNCTION: NORMAL
MDC_IDC_MSMT_BATTERY_DTM: NORMAL
MDC_IDC_MSMT_BATTERY_REMAINING_LONGEVITY: 150 MO
MDC_IDC_MSMT_BATTERY_REMAINING_PERCENTAGE: 100 %
MDC_IDC_MSMT_BATTERY_STATUS: NORMAL
MDC_IDC_MSMT_CAP_CHARGE_DTM: NORMAL
MDC_IDC_MSMT_CAP_CHARGE_TIME: 10.2 S
MDC_IDC_MSMT_CAP_CHARGE_TYPE: NORMAL
MDC_IDC_MSMT_LEADCHNL_RA_IMPEDANCE_VALUE: 564 OHM
MDC_IDC_MSMT_LEADCHNL_RA_LEAD_CHANNEL_STATUS: NORMAL
MDC_IDC_MSMT_LEADCHNL_RA_PACING_THRESHOLD_AMPLITUDE: 0.8 V
MDC_IDC_MSMT_LEADCHNL_RA_PACING_THRESHOLD_PULSEWIDTH: 0.4 MS
MDC_IDC_MSMT_LEADCHNL_RV_IMPEDANCE_VALUE: 346 OHM
MDC_IDC_MSMT_LEADCHNL_RV_PACING_THRESHOLD_AMPLITUDE: 0.7 V
MDC_IDC_MSMT_LEADCHNL_RV_PACING_THRESHOLD_PULSEWIDTH: 0.4 MS
MDC_IDC_PG_IMPLANT_DTM: NORMAL
MDC_IDC_PG_MFG: NORMAL
MDC_IDC_PG_MODEL: NORMAL
MDC_IDC_PG_SERIAL: NORMAL
MDC_IDC_PG_TYPE: NORMAL
MDC_IDC_SESS_CLINIC_NAME: NORMAL
MDC_IDC_SESS_DTM: NORMAL
MDC_IDC_SESS_TYPE: NORMAL
MDC_IDC_SET_BRADY_AT_MODE_SWITCH_MODE: NORMAL
MDC_IDC_SET_BRADY_AT_MODE_SWITCH_RATE: 170 {BEATS}/MIN
MDC_IDC_SET_BRADY_LOWRATE: 60 {BEATS}/MIN
MDC_IDC_SET_BRADY_MAX_SENSOR_RATE: 130 {BEATS}/MIN
MDC_IDC_SET_BRADY_MAX_TRACKING_RATE: 130 {BEATS}/MIN
MDC_IDC_SET_BRADY_MODE: NORMAL
MDC_IDC_SET_BRADY_PAV_DELAY_HIGH: 200 MS
MDC_IDC_SET_BRADY_PAV_DELAY_LOW: 300 MS
MDC_IDC_SET_BRADY_SAV_DELAY_HIGH: 200 MS
MDC_IDC_SET_BRADY_SAV_DELAY_LOW: 300 MS
MDC_IDC_SET_LEADCHNL_RA_PACING_AMPLITUDE: 2 V
MDC_IDC_SET_LEADCHNL_RA_PACING_CAPTURE_MODE: NORMAL
MDC_IDC_SET_LEADCHNL_RA_PACING_POLARITY: NORMAL
MDC_IDC_SET_LEADCHNL_RA_PACING_PULSEWIDTH: 0.4 MS
MDC_IDC_SET_LEADCHNL_RA_SENSING_ADAPTATION_MODE: NORMAL
MDC_IDC_SET_LEADCHNL_RA_SENSING_POLARITY: NORMAL
MDC_IDC_SET_LEADCHNL_RA_SENSING_SENSITIVITY: 0.25 MV
MDC_IDC_SET_LEADCHNL_RV_PACING_AMPLITUDE: 2 V
MDC_IDC_SET_LEADCHNL_RV_PACING_CAPTURE_MODE: NORMAL
MDC_IDC_SET_LEADCHNL_RV_PACING_POLARITY: NORMAL
MDC_IDC_SET_LEADCHNL_RV_PACING_PULSEWIDTH: 0.4 MS
MDC_IDC_SET_LEADCHNL_RV_SENSING_ADAPTATION_MODE: NORMAL
MDC_IDC_SET_LEADCHNL_RV_SENSING_POLARITY: NORMAL
MDC_IDC_SET_LEADCHNL_RV_SENSING_SENSITIVITY: 0.6 MV
MDC_IDC_SET_ZONE_DETECTION_INTERVAL: 250 MS
MDC_IDC_SET_ZONE_DETECTION_INTERVAL: 300 MS
MDC_IDC_SET_ZONE_DETECTION_INTERVAL: 353 MS
MDC_IDC_SET_ZONE_STATUS: NORMAL
MDC_IDC_SET_ZONE_TYPE: NORMAL
MDC_IDC_SET_ZONE_VENDOR_TYPE: NORMAL
MDC_IDC_STAT_AT_BURDEN_PERCENT: 31 %
MDC_IDC_STAT_AT_DTM_END: NORMAL
MDC_IDC_STAT_AT_DTM_START: NORMAL
MDC_IDC_STAT_BRADY_DTM_END: NORMAL
MDC_IDC_STAT_BRADY_DTM_START: NORMAL
MDC_IDC_STAT_BRADY_RA_PERCENT_PACED: 1 %
MDC_IDC_STAT_BRADY_RV_PERCENT_PACED: 2 %
MDC_IDC_STAT_EPISODE_RECENT_COUNT: 0
MDC_IDC_STAT_EPISODE_RECENT_COUNT_DTM_END: NORMAL
MDC_IDC_STAT_EPISODE_RECENT_COUNT_DTM_START: NORMAL
MDC_IDC_STAT_EPISODE_TYPE: NORMAL
MDC_IDC_STAT_EPISODE_VENDOR_TYPE: NORMAL
MDC_IDC_STAT_TACHYTHERAPY_ATP_DELIVERED_RECENT: 0
MDC_IDC_STAT_TACHYTHERAPY_ATP_DELIVERED_TOTAL: 0
MDC_IDC_STAT_TACHYTHERAPY_RECENT_DTM_END: NORMAL
MDC_IDC_STAT_TACHYTHERAPY_RECENT_DTM_START: NORMAL
MDC_IDC_STAT_TACHYTHERAPY_SHOCKS_ABORTED_RECENT: 0
MDC_IDC_STAT_TACHYTHERAPY_SHOCKS_ABORTED_TOTAL: 0
MDC_IDC_STAT_TACHYTHERAPY_SHOCKS_DELIVERED_RECENT: 0
MDC_IDC_STAT_TACHYTHERAPY_SHOCKS_DELIVERED_TOTAL: 0
MDC_IDC_STAT_TACHYTHERAPY_TOTAL_DTM_END: NORMAL
MDC_IDC_STAT_TACHYTHERAPY_TOTAL_DTM_START: NORMAL
NT-PROBNP SERPL-MCNC: 2702 PG/ML (ref 0–900)
PERFORMING LABORATORY: NORMAL
PLATELET # BLD AUTO: 185 10E3/UL (ref 150–450)
POTASSIUM SERPL-SCNC: 4.2 MMOL/L (ref 3.4–5.3)
PROT SERPL-MCNC: 6.9 G/DL (ref 6.4–8.3)
RBC # BLD AUTO: 3.5 10E6/UL (ref 4.4–5.9)
SODIUM SERPL-SCNC: 131 MMOL/L (ref 135–145)
SPECIMEN STATUS: NORMAL
TEST NAME: NORMAL
WBC # BLD AUTO: 5.5 10E3/UL (ref 4–11)

## 2024-12-20 PROCEDURE — 93283 PRGRMG EVAL IMPLANTABLE DFB: CPT | Performed by: INTERNAL MEDICINE

## 2024-12-20 NOTE — PATIENT INSTRUCTIONS
It was a pleasure to see you in clinic today, please do not hesitate to call us for questions or concerns.  We look forward to seeing you back in clinic on 3/17/2025, when you return to see Dr Cruz.    Lu Conteh RN    Electrophysiology Nurse Clinician  [facility]    During Business Hours Please Call:  [phone]  After Hours Please Call:  [phone] - select option #4 and ask for job code [phone]

## 2024-12-23 ENCOUNTER — CARE COORDINATION (OUTPATIENT)
Dept: CARDIOLOGY | Facility: CLINIC | Age: 74
End: 2024-12-23
Payer: MEDICARE

## 2024-12-23 LAB
COTININE SERPL-MCNC: <5 NG/ML
LABORATORY REPORT: NORMAL
NICOTINE SERPL-MCNC: <5 NG/ML
PETH INTERPRETATION: NORMAL
PLPETH BLD-MCNC: <10 NG/ML
POPETH BLD-MCNC: <10 NG/ML

## 2024-12-23 NOTE — PROGRESS NOTES
Called patient/caregiver to check in 6 weeks post discharge from VAD implant hospitilization. Pt reports VAD parameters sable and baseline with MAPS in the 80-85 range and weight stabel and baseline. Reviewed medications and answered any questions. Patient reports sleeping well and no anxiety since being home with LVAD. Patient is fully able to move around the house and care for himself independently.    Discussed specific new problems/stressors since being discharged from the hospital: the last LOW FLOW was 12/8, the dizziness spells are fewer and farther between now. Empathized with patient and reviewed coping strategies: enlisting support from friends and love ones, attending patient and caregiver support groups, reviewing LVAD educational materials to reinforce knowledge, and talking about concerns with family/care providers/trusted others. Encouraged pt to page VAD Coordinator with any issues or questions. Pt verbalizes understanding.

## 2024-12-27 ENCOUNTER — HOSPITAL ENCOUNTER (OUTPATIENT)
Dept: CARDIAC REHAB | Facility: CLINIC | Age: 74
Discharge: HOME | End: 2024-12-27
Attending: SURGERY
Payer: MEDICARE

## 2024-12-27 DIAGNOSIS — I25.5 ISCHEMIC CARDIOMYOPATHY: ICD-10-CM

## 2024-12-27 DIAGNOSIS — Z95.811 LVAD (LEFT VENTRICULAR ASSIST DEVICE) PRESENT (H): ICD-10-CM

## 2024-12-27 DIAGNOSIS — I50.22 CHRONIC SYSTOLIC HEART FAILURE (H): ICD-10-CM

## 2024-12-27 PROCEDURE — 93798 PHYS/QHP OP CAR RHAB W/ECG: CPT

## 2024-12-27 PROCEDURE — 93797 PHYS/QHP OP CAR RHAB WO ECG: CPT

## 2025-01-02 ENCOUNTER — CARE COORDINATION (OUTPATIENT)
Dept: CARDIOLOGY | Facility: CLINIC | Age: 75
End: 2025-01-02
Payer: MEDICARE

## 2025-01-03 ENCOUNTER — HOSPITAL ENCOUNTER (OUTPATIENT)
Dept: CARDIAC REHAB | Facility: CLINIC | Age: 75
Discharge: HOME | End: 2025-01-03
Attending: FAMILY MEDICINE
Payer: MEDICARE

## 2025-01-03 PROCEDURE — 93798 PHYS/QHP OP CAR RHAB W/ECG: CPT

## 2025-01-06 ENCOUNTER — LAB (OUTPATIENT)
Dept: LAB | Facility: CLINIC | Age: 75
End: 2025-01-06
Payer: MEDICARE

## 2025-01-06 ENCOUNTER — OFFICE VISIT (OUTPATIENT)
Dept: CARDIOLOGY | Facility: CLINIC | Age: 75
End: 2025-01-06
Attending: INTERNAL MEDICINE
Payer: MEDICARE

## 2025-01-06 ENCOUNTER — ANTICOAGULATION THERAPY VISIT (OUTPATIENT)
Dept: ANTICOAGULATION | Facility: CLINIC | Age: 75
End: 2025-01-06

## 2025-01-06 VITALS
BODY MASS INDEX: 23.72 KG/M2 | OXYGEN SATURATION: 95 % | SYSTOLIC BLOOD PRESSURE: 62 MMHG | WEIGHT: 151.45 LBS | HEART RATE: 74 BPM

## 2025-01-06 DIAGNOSIS — Z79.01 ANTICOAGULATED ON WARFARIN: ICD-10-CM

## 2025-01-06 DIAGNOSIS — I95.1 ORTHOSTATIC HYPOTENSION: ICD-10-CM

## 2025-01-06 DIAGNOSIS — I50.22 CHRONIC SYSTOLIC CONGESTIVE HEART FAILURE (H): Primary | ICD-10-CM

## 2025-01-06 DIAGNOSIS — I50.42 CHRONIC COMBINED SYSTOLIC AND DIASTOLIC CONGESTIVE HEART FAILURE (H): Primary | ICD-10-CM

## 2025-01-06 DIAGNOSIS — I95.89 OTHER SPECIFIED HYPOTENSION: ICD-10-CM

## 2025-01-06 DIAGNOSIS — Z95.811 LVAD (LEFT VENTRICULAR ASSIST DEVICE) PRESENT (H): ICD-10-CM

## 2025-01-06 DIAGNOSIS — I50.42 CHRONIC COMBINED SYSTOLIC AND DIASTOLIC HEART FAILURE (H): ICD-10-CM

## 2025-01-06 DIAGNOSIS — I50.22 CHRONIC SYSTOLIC CONGESTIVE HEART FAILURE (H): ICD-10-CM

## 2025-01-06 LAB
ALBUMIN SERPL BCG-MCNC: 3.7 G/DL (ref 3.5–5.2)
ALP SERPL-CCNC: 123 U/L (ref 40–150)
ALT SERPL W P-5'-P-CCNC: 24 U/L (ref 0–70)
ANION GAP SERPL CALCULATED.3IONS-SCNC: 11 MMOL/L (ref 7–15)
AST SERPL W P-5'-P-CCNC: 25 U/L (ref 0–45)
BILIRUB SERPL-MCNC: 0.5 MG/DL
BUN SERPL-MCNC: 9.1 MG/DL (ref 8–23)
CALCIUM SERPL-MCNC: 8.9 MG/DL (ref 8.8–10.4)
CHLORIDE SERPL-SCNC: 98 MMOL/L (ref 98–107)
CREAT SERPL-MCNC: 0.87 MG/DL (ref 0.67–1.17)
EGFRCR SERPLBLD CKD-EPI 2021: >90 ML/MIN/1.73M2
ERYTHROCYTE [DISTWIDTH] IN BLOOD BY AUTOMATED COUNT: 15.2 % (ref 10–15)
GLUCOSE SERPL-MCNC: 105 MG/DL (ref 70–99)
HCO3 SERPL-SCNC: 26 MMOL/L (ref 22–29)
HCT VFR BLD AUTO: 34.3 % (ref 40–53)
HGB BLD-MCNC: 11.7 G/DL (ref 13.3–17.7)
INR PPP: 2.73 (ref 0.85–1.15)
LDH SERPL L TO P-CCNC: 219 U/L (ref 0–250)
MCH RBC QN AUTO: 30.2 PG (ref 26.5–33)
MCHC RBC AUTO-ENTMCNC: 34.1 G/DL (ref 31.5–36.5)
MCV RBC AUTO: 88 FL (ref 78–100)
NT-PROBNP SERPL-MCNC: 2299 PG/ML (ref 0–900)
PLATELET # BLD AUTO: 210 10E3/UL (ref 150–450)
POTASSIUM SERPL-SCNC: 3.8 MMOL/L (ref 3.4–5.3)
PROT SERPL-MCNC: 7.3 G/DL (ref 6.4–8.3)
RBC # BLD AUTO: 3.88 10E6/UL (ref 4.4–5.9)
SODIUM SERPL-SCNC: 135 MMOL/L (ref 135–145)
WBC # BLD AUTO: 7.5 10E3/UL (ref 4–11)

## 2025-01-06 PROCEDURE — 93750 INTERROGATION VAD IN PERSON: CPT | Performed by: INTERNAL MEDICINE

## 2025-01-06 PROCEDURE — 85027 COMPLETE CBC AUTOMATED: CPT | Performed by: PATHOLOGY

## 2025-01-06 PROCEDURE — 85610 PROTHROMBIN TIME: CPT | Performed by: PATHOLOGY

## 2025-01-06 PROCEDURE — 80053 COMPREHEN METABOLIC PANEL: CPT | Performed by: PATHOLOGY

## 2025-01-06 PROCEDURE — G0463 HOSPITAL OUTPT CLINIC VISIT: HCPCS | Performed by: INTERNAL MEDICINE

## 2025-01-06 PROCEDURE — 83615 LACTATE (LD) (LDH) ENZYME: CPT | Performed by: PATHOLOGY

## 2025-01-06 PROCEDURE — 83880 ASSAY OF NATRIURETIC PEPTIDE: CPT | Performed by: PATHOLOGY

## 2025-01-06 PROCEDURE — 99215 OFFICE O/P EST HI 40 MIN: CPT | Performed by: INTERNAL MEDICINE

## 2025-01-06 PROCEDURE — 36415 COLL VENOUS BLD VENIPUNCTURE: CPT | Performed by: PATHOLOGY

## 2025-01-06 ASSESSMENT — PAIN SCALES - GENERAL: PAINLEVEL_OUTOF10: NO PAIN (0)

## 2025-01-06 NOTE — PATIENT INSTRUCTIONS
" Ventricular Assist Device Clinic  Take your medications every day, as directed!  Medication changes made today:  No changes Instructions:  Keep up the excellent work! Monitor your heart failure, Page the VAD Coordinator on call:  If you gain more than 3 lb in a day or 5 in a week  If you feel worsening shortness of breath, palpitations, or swelling.   If you have VAD alarms or change in parameters  If you feel dizzy or lightheaded     Keep your VAD appointments!    Your next appointment is 1/22 with Sirisha Arias and   2/3 with Dr. Cruz.   We will have the schedulers contact you about rearranging your appointments in March.      Please see the clinic schedulers after your appointment to schedule follow-up.    If you do not have an appointment scheduled, you need to call the VAD  at [phone]. To Page the VAD Coordinator on call, dial [phone] option #4 and ask to speak to the VAD coordinator on call. Try to maintain a heart healthy lifestyle!  Limit salt & sodium to 2000mg/day   Eat a heart healthy diet, low in saturated fats  Stay active! Aim to move at least 150 minutes every week.    Use [employer] | [employer] allows you to communicate directly with your heart team through secure messaging.  [employer] can be accessed any time on your phone, computer, or tablet.  If you need assistance, we'd be happy to help!      Equipment Reminders:   Charge your back-up controller at least every 6 months. To charge, connect it to either batteries or wall power. The screen will read \"Charging\". Keep connected until the screen reads \"charging complete\". Disconnect power once the controller battery is charged. Also do a self-test when the controller is connected to power.  Replace the AA batteries in your mobile power unit every 6 months.  Check your battery charger for when it is due for maintenance. It requires inspection in clinic once per year. There will be a sticker stating the month and year maintenance is due. " When you bring your battery charger to clinic, tell one of the schedulers you have LVAD equipment that needs maintenance. They will call [facility]. You can leave your  under the LVAD sign by the  at the far end of clinic. You must drop it off before 2pm.

## 2025-01-06 NOTE — PROGRESS NOTES
[facility]  Advanced Heart Failure Clinic    Patient Name: Duane C Johnson   : 1950   Date of Visit: 2025    Primary Care Physician: Jose Coles MD     Referring Physician: No ref. provider found   Reason for Visit: LVAD    Dear Dr. Sanket MD     I had the pleasure of seeing Duane C Johnson today in the [facility] Advanced Heart Failure Clinic. As you know Duane C Johnson is a pleasant 74 year old year old male, who presents today for further evaluation of LVAD.    Duane has a history of end-stage cardiomyopathy, history of coronary artery disease, previous STEMI, history of VT VF arrest, status post placement of ICD, history of A-fib a flutter with previous cardioversion, status post HeartMate 3 LVAD implantation 2024.    Duane was discharged to ARU on 10/5/24. There, he continued to have frequent low flow alarms with labile BPs and has required frequent adjustments to BP medications, initiation of florinef, as well as IVF and increased PO intake. Seen in clinic on 10/23 where he was presyncopal with MAPs in 40s. He was sent from clinic to ED for further evaluation. Admitted 10/23/24 for further workup of hypotension, hypovolemia, and low flows. Underwent stroke eval for neurological changes with negative CT. Symptoms improved following 2L IVF and improved MAPs. He was resumed on low doses of GDMT/afterload reduction. During this hospitalization continued to have several more runs of low flows. MAPs/meds optimized and prior to discharge he was without low flows/high PIs x 3 days. Controlled was also switched to low flow controlled. Discharge was delayed due to needing additional LVAD education. Consulted neuropsych due to concerns for cognitive changes - Alzheimer's disease or possibly mixed pathology (mild cognitive impairment).     Following discharge, he presented to the emergency room on 11/10/2024 with witnessed syncopal episode, feeling lightheaded which led to a  fall to the ground but he did not strike his head or suffer any injury.  He reported that he did not lose consciousness although EMS reported that family witnessed a brief loss of consciousness episode.  The ER spoke to Dr. Nicolas, who suggested aggressive hydration as he is known to have hypovolemia and orthostasis and he had not had much to eat or drink that day before this episode. He did not have a low flow alarm then.    We have been seeing him in clinic frequently, and have mostly cut back on some of his anti hypertensives. He saw me last on 12/20/24    ____________________    Today he reports feeling well. He continues to have some episodes of lightheadedness, especially if he gets up quickly. But no fainting episodes or the shaking he previously noted since last visit. When he feels this coming on, he sits down quickly and it passes quickly.     Few days back he and his wife moved the LVAD driveline anchor as it was pulling it being close to the skin and it is not pulling anymore.     He is drinking a lot (gatorade), and he and his wife confirm that he is drinking at least 100 - 120 oz of fluid per day water/ gatorade, milk, one 14 oz of Core 42 protein drink, has cut down on mountain dew to less than one per day. His low flow alarm limit is set to 2L/min, last alarm was on 12/8/24, very brief.    He is mostly not using his walker, and reports he is using it if walking further than just going from couch to bathroom (about 20 feet) when he holds on to walls/doorways. His wife is with him 24x7, she works from home.    He has started cardiac rehab last week, planning to go MWF    Home BPs - Doppler at home MAPs in 80s mm Hg usually, once was 104 and on recheck was 90s, sometimes in 65-70s  Home weights - not checking    ROS:  A complete 10-point ROS was negative except as above.    PAST MEDICAL HISTORY:  Past Medical History:   Diagnosis Date    Benign essential hypertension     CAD (coronary artery disease)      Chronic systolic heart failure (H)     Hypertension     ST elevation myocardial infarction (STEMI), unspecified artery (H)     Ventricular fibrillation (H)        PAST SURGICAL HISTORY:  Past Surgical History:   Procedure Laterality Date    ANESTHESIA CARDIOVERSION N/A 9/5/2024    Procedure: Anesthesia cardioversion;  Surgeon: GENERIC ANESTHESIA PROVIDER;  [facility]    ANESTHESIA CARDIOVERSION N/A 9/30/2024    Procedure: Anesthesia cardioversion;  Surgeon: GENERIC ANESTHESIA PROVIDER;  Location: [facility]    COLONOSCOPY N/A 3/23/2023    Procedure: COLONOSCOPY, FLEXIBLE, WITH LESION REMOVAL USING SNARE;  Surgeon: Jose Faustin MD;  Location: [facility]    CV CENTRAL VENOUS CATHETER PLACEMENT N/A 10/26/2023    Procedure: Central Venous Catheter Placement;  Surgeon: Rob Lyles MD;  Location: [facility] CARDIAC CATH LAB    CV CORONARY ANGIOGRAM N/A 10/27/2023    Procedure: Coronary Angiogram;  Surgeon: Rob Lyles MD;  Location: [facility] CARDIAC CATH LAB    CV CORONARY ANGIOGRAM N/A 10/26/2023    Procedure: Coronary Angiogram;  Surgeon: Rob Lyles MD;  Location: [facility] CARDIAC CATH LAB    CV LEFT HEART CATH N/A 10/26/2023    Procedure: Left Heart Catheterization;  Surgeon: Rob Lyles MD;  Location: [facility] CARDIAC CATH LAB    CV PCI N/A 10/27/2023    Procedure: Percutaneous Coronary Intervention;  Surgeon: Rob Lyles MD;  Location:  HEART CARDIAC CATH LAB    CV PCI STENT DRUG ELUTING N/A 10/26/2023    Procedure: Percutaneous Coronary Intervention Stent;  Surgeon: Rob Lyles MD;  Location: [facility] CARDIAC CATH LAB    CV RIGHT HEART CATH MEASUREMENTS RECORDED N/A 5/6/2024    Procedure: Heart Cath Right Heart Cath;  Surgeon: Rob Lyles MD;  Location: [facility] CARDIAC CATH LAB    CV RIGHT HEART CATH MEASUREMENTS RECORDED N/A 9/3/2024    Procedure: Right Heart Catheterization;  Surgeon: Aaron Mesa MD;  Location: [facility]  CARDIAC CATH LAB    CV RIGHT HEART CATH MEASUREMENTS RECORDED N/A 10/24/2024    Procedure: Right Heart Catheterization;  Surgeon: Haile Braden MD;  Location:  HEART CARDIAC CATH LAB    EP ICD INSERT SINGLE N/A 2024    Procedure: Implantable Cardioverter Defibrillator Device & Lead Implant Dual;  Surgeon: Guero Black MD;  Location:  HEART CARDIAC CATH LAB    INJECTION, HYDROGEL SPACER N/A 2024    Procedure: INJECTION, HYDROGEL SPACER AND FIDUCIAL MARKER PLACEMENT;  Surgeon: Stanislaw Barros MD;  Location: [facility] OR    INSERT VENTRICULAR ASSIST DEVICE LEFT (HEARTMATE II) N/A 2024    Procedure: Median Sternotomy, INSERTION of LEFT VENTRICULAR ASSIST DEVICE (HEARTMATE III), cardiopulmonary bypass, transesophageal echocardiogram performed by anesthesia.;  Surgeon: Mulvihill, Michael, MD;  Location: [facility] OR    IRRIGATION AND DEBRIDEMENT CHEST WASHOUT, COMBINED Right 2024    Procedure: Exploration of chest, chest washout;  Surgeon: Mulvihill, Michael, MD;  Location: [facility] OR       FAMILY HISTORY:  Family History   Problem Relation Age of Onset    Other Cancer Mother     Obesity Mother     GI problems Father     Uterine Cancer Sister        SOCIAL HISTORY:  Social History     Socioeconomic History    Marital status:      Spouse name: None    Number of children: None    Years of education: None    Highest education level: None   Tobacco Use    Smoking status: Former     Current packs/day: 0.00     Average packs/day: 0.3 packs/day for 30.0 years (7.5 ttl pk-yrs)     Types: Cigarettes     Start date: 10/26/1993     Quit date: 10/26/2023     Years since quittin.0     Passive exposure: Past    Smokeless tobacco: Never    Tobacco comments:     Quit 10/26/2023   Vaping Use    Vaping status: Never Used   Substance and Sexual Activity    Alcohol use: Yes     Comment: 1-2 beers per day    Drug use: No    Sexual activity: Yes     Partners: Female     Birth control/protection: None    Other Topics Concern    Parent/sibling w/ CABG, MI or angioplasty before 65F 55M? No     Social Drivers of Health     Financial Resource Strain: Low Risk  (10/23/2024)    Financial Resource Strain     Within the past 12 months, have you or your family members you live with been unable to get utilities (heat, electricity) when it was really needed?: No   Food Insecurity: Low Risk  (10/23/2024)    Food Insecurity     Within the past 12 months, did you worry that your food would run out before you got money to buy more?: No     Within the past 12 months, did the food you bought just not last and you didn t have money to get more?: No   Transportation Needs: Low Risk  (10/23/2024)    Transportation Needs     Within the past 12 months, has lack of transportation kept you from medical appointments, getting your medicines, non-medical meetings or appointments, work, or from getting things that you need?: No   Physical Activity: Inactive (11/16/2023)    Exercise Vital Sign     Days of Exercise per Week: 0 days     Minutes of Exercise per Session: 0 min   Social Connections: Unknown (11/16/2023)    Social Connection and Isolation Panel [NHANES]     Marital Status:    Interpersonal Safety: Low Risk  (10/23/2024)    Interpersonal Safety     Do you feel physically and emotionally safe where you currently live?: Yes     Within the past 12 months, have you been hit, slapped, kicked or otherwise physically hurt by someone?: No     Within the past 12 months, have you been humiliated or emotionally abused in other ways by your partner or ex-partner?: No   Recent Concern: Interpersonal Safety - High Risk (10/17/2024)    Interpersonal Safety     Do you feel physically and emotionally safe where you currently live?: No     Within the past 12 months, have you been hit, slapped, kicked or otherwise physically hurt by someone?: No     Within the past 12 months, have you been humiliated or emotionally abused in other ways by your  partner or ex-partner?: No   Housing Stability: Low Risk  (10/23/2024)    Housing Stability     Do you have housing? : Yes     Are you worried about losing your housing?: No   Recent Concern: Housing Stability - High Risk (9/1/2024)    Housing Stability     Do you have housing? : No     Are you worried about losing your housing?: No       MEDICATIONS:  Current Outpatient Medications   Medication Sig Dispense Refill    acetaminophen (TYLENOL) 325 MG tablet Take 2 tablets (650 mg) by mouth every 4 hours as needed for other (For optimal non-opioid multimodal pain management to improve pain control.).      amiodarone (PACERONE) 200 MG tablet Take 1 tablet (200 mg) by mouth daily. 90 tablet 3    amLODIPine (NORVASC) 10 MG tablet Take 0.5 tablets (5 mg) by mouth daily. 45 tablet 3    atorvastatin (LIPITOR) 80 MG tablet Take 1 tablet (80 mg) by mouth every evening. 90 tablet 3    digoxin (LANOXIN) 62.5 MCG tablet Take 1 tablet (62.5 mcg) by mouth daily. 30 tablet 11    escitalopram (LEXAPRO) 10 MG tablet Take 1 tablet (10 mg) by mouth or Feeding Tube daily. 90 tablet 3    gabapentin (NEURONTIN) 100 MG capsule Take 1 capsule (100 mg) by mouth or Feeding Tube at bedtime. 90 capsule 3    hydrALAZINE (APRESOLINE) 25 MG tablet Take 1 tablet (25 mg) by mouth 3 times daily. 180 tablet 3    magnesium hydroxide (MILK OF MAGNESIA) 400 MG/5ML suspension Take 30 mLs by mouth daily as needed for constipation (Use if polyethylene glycol (Miralax) is not effective after 24 hours.). 354 mL 0    magnesium oxide (MAG-OX) 400 MG tablet Take 1 tablet (400 mg) by mouth daily. 90 tablet 3    melatonin 10 MG TABS tablet Take 1 tablet (10 mg) by mouth every evening.      multivitamin w/minerals (THERA-VIT-M) tablet Take 1 tablet by mouth daily. 90 tablet 3    warfarin ANTICOAGULANT (COUMADIN) 2.5 MG tablet Take 1 tablet (2.5 mg) by mouth every evening. 90 tablet 3     No current facility-administered medications for this visit.         ALLERGIES:     Allergies   Allergen Reactions    Brilinta [Ticagrelor] Other (See Comments) and Difficulty breathing     Per pt and spouse, hyperventilation.    Clonazepam Other (See Comments)     Per spouse, acted like a zombie and he was shaky, could barely talk.       PHYSICAL EXAM:  BP (!) 62/0 (BP Location: Left arm, Patient Position: Chair, Cuff Size: Adult Small)   Pulse 74   Wt 68.7 kg (151 lb 7.2 oz)   SpO2 95%   BMI 23.72 kg/m      Gen: alert, interactive, NAD  Neck: supple, no JVD  CV: VAD hum+  Chest: CTAB, no wheezes or crackles  Abd: soft, NT, ND, +BS, driveline dressing in place  Ext: no LE edema    LABS:    CMP  Last Comprehensive Metabolic Panel:  Sodium   Date Value Ref Range Status   01/06/2025 135 135 - 145 mmol/L Final   05/26/2021 141 133 - 144 mmol/L Final     Sodium Whole Blood   Date Value Ref Range Status   10/27/2023 138 135 - 145 mmol/L Final     Potassium   Date Value Ref Range Status   01/06/2025 3.8 3.4 - 5.3 mmol/L Final   05/26/2021 4.5 3.4 - 5.3 mmol/L Final     Potassium Whole Blood   Date Value Ref Range Status   09/23/2024 2.5 (LL) 3.4 - 5.3 mmol/L Final     Potassium POCT   Date Value Ref Range Status   10/23/2024 3.8 3.4 - 5.3 mmol/L Final     Chloride   Date Value Ref Range Status   01/06/2025 98 98 - 107 mmol/L Final   05/26/2021 105 94 - 109 mmol/L Final     Chloride Whole Blood   Date Value Ref Range Status   10/27/2023 103 94 - 109 mmol/L Final     Carbon Dioxide   Date Value Ref Range Status   05/26/2021 29 20 - 32 mmol/L Final     Carbon Dioxide (CO2)   Date Value Ref Range Status   01/06/2025 26 22 - 29 mmol/L Final     Carbon Dioxide Whole Blood   Date Value Ref Range Status   10/27/2023 27 20 - 32 mmol/L Final     Anion Gap   Date Value Ref Range Status   01/06/2025 11 7 - 15 mmol/L Final   05/26/2021 7 3 - 14 mmol/L Final     Glucose   Date Value Ref Range Status   01/06/2025 105 (H) 70 - 99 mg/dL Final   05/26/2021 125 (H) 70 - 99 mg/dL Final     GLUCOSE BY  "METER POCT   Date Value Ref Range Status   11/09/2024 111 (H) 70 - 99 mg/dL Final     Urea Nitrogen   Date Value Ref Range Status   01/06/2025 9.1 8.0 - 23.0 mg/dL Final   05/26/2021 41 (H) 7 - 30 mg/dL Final     Creatinine   Date Value Ref Range Status   01/06/2025 0.87 0.67 - 1.17 mg/dL Final   05/26/2021 0.80 0.66 - 1.25 mg/dL Final     GFR Estimate   Date Value Ref Range Status   01/06/2025 >90 >60 mL/min/1.73m2 Final     Comment:     eGFR calculated using 2021 CKD-EPI equation.   05/26/2021 90 >60 mL/min/[1.73_m2] Final     Comment:     Non  GFR Calc  Starting 12/18/2018, serum creatinine based estimated GFR (eGFR) will be   calculated using the Chronic Kidney Disease Epidemiology Collaboration   (CKD-EPI) equation.       GFR, ESTIMATED POCT   Date Value Ref Range Status   10/23/2024 >60 >60 mL/min/1.73m2 Final     Calcium   Date Value Ref Range Status   01/06/2025 8.9 8.8 - 10.4 mg/dL Final     Comment:     Reference intervals for this test were updated on 7/16/2024 to reflect our healthy population more accurately. There may be differences in the flagging of prior results with similar values performed with this method. Those prior results can be interpreted in the context of the updated reference intervals.   05/26/2021 8.3 (L) 8.5 - 10.1 mg/dL Final     Bilirubin Total   Date Value Ref Range Status   01/06/2025 0.5 <=1.2 mg/dL Final   03/20/2018 0.5 0.2 - 1.3 mg/dL Final     Alkaline Phosphatase   Date Value Ref Range Status   01/06/2025 123 40 - 150 U/L Final   03/20/2018 82 40 - 150 U/L Final     ALT   Date Value Ref Range Status   01/06/2025 24 0 - 70 U/L Final   03/20/2018 22 0 - 70 U/L Final     AST   Date Value Ref Range Status   01/06/2025 25 0 - 45 U/L Final   03/20/2018 20 0 - 45 U/L Final       NT-proBNP       LDH      TROP  No results found for: \"TROPI\", \"TROPONIN\", \"TROPR\", \"TROPN\"    CBC  CBC RESULTS:   Recent Labs   Lab Test 11/13/24  0910   WBC 5.4   RBC 3.49*   HGB 10.6*   HCT " 31.3*   MCV 90   MCH 30.4   MCHC 33.9   RDW 15.1*          LIPIDS  Recent Labs   Lab Test 09/04/24  0626 08/31/24  0414   CHOL 85 83   HDL 29* 25*   LDL 45 46   TRIG 56 59       TSH  TSH   Date Value Ref Range Status   09/04/2024 2.45 0.30 - 4.20 uIU/mL Final       HBA1C  Lab Results   Component Value Date    A1C 5.4 09/18/2024    A1C 4.8 03/13/2023         CARDIAC DATA:    EKG:  10/23/24: Atrial fibrillation, Left anterior fascicular block, Possible Lateral infarct , age undetermined     Echo:  10/23/24  LVAD cannula was seen in the expected anatomic position in the LV apex.  HM3 at 5100RPM.  LVIDd 47mm.  Septum normal.  Aortic valve open intermittently. No AI.  Normal flow velocities.  Global right ventricular function is mildly to moderately reduced.  Small circumferential pericardial effusion is present without any hemodynamic significance.    Cardiac Catheterization:  RHC 10/23/24  BSA 1.75 m2  /89/101 mmHg  RA --/--/6 mmHg  RV 30/3mmHg  PA 30/14/20 mmHg  PCWP --/--/9 mmHg  TD CO/CI 4.1/2.34   Goldy CO/CI 4.54/2.59   PVR 2.68 (TD)  PA sat 65%   PCW Oximeter sat 95%  Hgb 10.5 g/dL Right sided filling pressures are normal. Left sided filling pressures are normal. Normal PA pressures. Normal cardiac output level.       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 NYHA Class III.     10/23/24 RA 6, PA 40/14/20, PCW 9, Goldy CO/CI 4.54/2.59 at 5100 rpm     Fluid status: mostly has been hypo-volemic, and is now on an aggressive oral hydration regimen.  He is drinking fluid intake >120 ounce per day, mainly Gatorade/water, avoid soda,   ACEi/ARB/ARNi: off  losartan due to hypotension  Vasodilator: hydralazine 25 mg q8h.  amlodipine 5 mg daily --> may need further reduction in the future  BB: deferred due to recent VAD. Sometimes misses mid day dose hydralazine  Aldosterone antagonist: STOPPED blane due to hypoNa  SGLT2i: STOPPED empagliflozin 10 mg due to propensity for hypovolemia    Also advised to increase salt intake in the food, salty snacks 3 times per day and increase added salt to the food.  As a next step may also need to consider adding salt tablets.  We discussed that all of this is the opposite of what we do for heart failure patients, but since his LVAD implant, he has robust urinary output and is auto diuresing, and with persistent low flow alarms, speed drops, dizziness, hypotension -would benefit from increased fluid and salt intake.  We discussed that none of these are perfect and may need continued adjustments and close monitoring based on his clinical status.    With above plans, he is slowly improving with no low flow alarms since 12/8/24, improving lightheadedness and no further syncope    He started cardiac rehab last week, plans to go MWF    SCD prophylaxis: ICD  MAP: goal 70-90, encouraging POs ulitize hold parameters. MAP lower today but will continue current regiment based on improved clinical status  LDH trends: stable   Anticoagulation: warfarin dosing per pharmacy, INR goal 2-3  Antiplatelet: ASA not indicated in LVAD population, > 6 months s/p STEMI    Other:   - No fluid restriction, encourage drinks with Na/electrolytes, increase fluid intake to greater than 120 ounce per day, eat frequent salty snacks  - Patient has a low flow controller, will not alarm until flow <2. Hematocrit is set to match his true hematocrit  - Dr. Smith has reviewed inflow and outflow positioning, nothing to do      Left ventricular assist device interrogation: The patient's HeartMate II / III LVAD was interrogated today 01/06/25 .     I have personally  interrogated the LVAD, reviewing all parameters and alarm trends as noted in the note.     Patient is euvolemic    Exam otherwise as noted in the note    Heartmate 3 LEFT VS  Flow (Lpm): 3.7 Lpm  Pulse Index (PI): 2.2 PI  Speed (rpm): 5000 rpm  Power (jackson): 3.2 jackson  Current Hct settin.3     Alarms were reviewed, and notable for none.  The driveline exit site was inspected, and showed no concerns.  All external components were inspected and showed no evidence of damage or malfunction.  No components were replaced.  No changes to VAD settings made               # Low flow alarms  # Elevated PIs  # Labile MAPs   # Suspected orthostatic hypotension, resolved  Low flows seems releated to hypertension and some hypovolemia as well. Have improved with re-timing hydralazine and after increasing oral fluids. CTA was done on  10/17/24 with no outflow graft ostruction. The outflow graft does make an acute angle as it approaches the aorta, discussed with Dr. Mulvihill, unlikely to be impacting the low flows at this time.  RHC with reasonable filling pressures as above. He has been asymptomatic.  - Decreased speed to 5000 on 10/27  - Losartan stopped previously for low BP  - SGLT2 inihibitor stopped for hypovolemia  - Hydralazine and amlodipine doses reduced as above  - Stopped fludrocortisone 0.1 mg daily previously  - Increase PO intake as above, specifically right before bed, non free-water sources due to hypoNa  - Temp drop in hematocrit on monitor 10/29-10/30, low-flow controller change out 10/30, changed hematocrit back 10/31  - This is now overall improved     # History of VT/VF arrest   # AFib s/p DCCV 2024 and again 2024   Successfully cardioverted in early September for AF. Since then EKG suggested AF on . Tele is only available from as early as . The patient was started on hep gtt , but unclear if was in AF before this. While the patient was on hep gtt until INR was therapeutic, it is  "not possible to assess rhythm prior to 9/21. Systemic AC was off on 9/18-9/20. S/p MELBA and DCCV on 9/30 with conversion to sinus rhythm.  10/23 device interrogation shows persistent episode of AF since 10/10 with rates   - Continue amiodarone 200 mg daily  - S/p digoxin load, continue digoxin 62.5 mcg daily   - Ongoing a fib- given ongoing low flows- deferring cardioversion for now given reasonable rate control.   - AC as above     # Hypovolemia hyponatremia, stable  improving  - encourage high sodium PO intake/gatorade and salty foods     # Visual changes, resolved.   # Generalized weakness, improved   # Headache, resolved  Stoke code called 9/29 for visual changes - right eye with decreased upper half of vision and bluish haze. Resolved after 30 minutes. Seen by opthalmology and neuro. Negative head CT and CTA head and neck. He has had 3 episodes which last about 5 minutes before resolving completely.      On 10/9 Stroke code activated due to concern of generalized weakness, numbness and headache. LKW reported as 0715 when he worked with therapies and shortly after started feeling generally weak. Then around 1500 he had onset of moderate \"tension\" type headache and tingling in the bilateral fingertips. Per RRT URIAH patient now reports tingling has resolved and headache is improving. MAP 48, INR 2.2. Given absence of focal deficits and rapidly improving symptoms, opted to cancel stroke.  CT head negative, though did show chronic maxillary sinusitis.      Stroke code called 10/23 when seen in clinic with report of new RUE weakness. He was notably hypotensive at this time with MAP ~45 with new right pronator drift. Evaluated in ED, CT and CTA head and neck negative. RUE weakness resolved.   - monitor for changes- no current symptoms    Started cardiac rehab Jan 2025        # Cognitive changes  - Formal neuropsych testing this admission reveiling some cognitive changes  - 24/7 care during his first 30 days, will " likely not need therafter, but continue to assess with each LVAD appointment (per Ho Méndez note from 11/8 Duane has passed his VAD education)  - Needs formal driving assessment with DMV or OT prior to being cleared to drive, wife aware and also discussed with patient at time of discharge  - Repeat neuropsych testing in 1 year  - Referred to behavioral neurology in place      I spent 42 minutes in care of the patient today including obtaining recent medical history, personally reviewing recent cardiac testing and/or lab results, today's examination, discussion of testing results and care recommendations with patient.       Thank you for the opportunity to participate in this patient's care. Please feel free to reach out with any questions or concerns.      Ham Cruz MD, FACC  Associate Professor of Medicine  Advanced Heart Failure, LVAD & Cardiac Transplant  Director, Cardio-Obstetrics  Cardio-Oncology  [facility]

## 2025-01-06 NOTE — NURSING NOTE
Chief Complaint   Patient presents with    Follow Up     Return VAD       Vitals were take, medications reconciled     Craig Roblero, EMT    1:39 PM

## 2025-01-06 NOTE — PROGRESS NOTES
ANTICOAGULATION MANAGEMENT     Duane C Johnson 74 year old male is on warfarin with therapeutic INR result. (Goal INR 2.0-3.0)    Recent labs: (last 7 days)     01/06/25  1304   INR 2.73*       ASSESSMENT     Source(s): Chart Review and Patient/Caregiver Call     Warfarin doses taken: Warfarin taken as instructed  Diet:  Says that he has either Core 42 protein drink or Ensure daily - explained that consistency is important with this as increase/decrease could affect INR.  Medication/supplement changes: None noted  New illness, injury, or hospitalization: No - per Cardiology OV note and patient and wife today - no further syncopal episodes and dizziness/lightheadedness improved  Signs or symptoms of bleeding or clotting: No  Previous result: Therapeutic last 2(+) visits  Additional findings: None       PLAN     Recommended plan for no diet, medication or health factor changes and previous 2 INR results within goal affecting INR     Dosing Instructions: Continue your current warfarin dose with next INR in just over 2 weeks (has VAD appt)      Summary  As of 1/6/2025      Full warfarin instructions:  1.25 mg every Mon, Wed, Fri; 2.5 mg all other days   Next INR check:  1/22/2025               Telephone call with Duane and Sharon who verbalizes understanding and agrees to plan    Check at provider office visit - added INR check to other labs 1/22/25    Education provided: Goal range and lab monitoring: goal range and significance of current result  Dietary considerations: importance of consistent vitamin K intake and Impact of protein intake on INR   Contact [phone] with any changes, questions or concerns.     Plan made per [facility] anticoagulation protocol and per LVAD protocol    Galilea Bledsoe RN  1/6/2025  Anticoagulation Clinic  [employer] for routing messages: susannah MARTINEZ LVAD  [facility] patient phone line: [phone]        _______________________________________________________________________     Anticoagulation  Episode Summary       Current INR goal:  2.0-3.0   TTR:  100.0% (1.8 mo)   Target end date:  Indefinite   Send INR reminders to:  ANTICOAG LVAD    Indications    Chronic systolic congestive heart failure (H) [I50.22]  Anticoagulated on warfarin [Z79.01]  LVAD (left ventricular assist device) present (H) [Z95.811]  Other specified hypotension [I95.89]  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

## 2025-01-06 NOTE — NURSING NOTE
[facility] VAD Pump Info       Row Name 01/06/25 0313             [facility] VAD Information    Implant --      LVAD Pump --         Heartmate 3 LEFT VS    Flow (Lpm) 3.7 Lpm      Pulse Index (PI) 2.2 PI      Speed (rpm) 5000 rpm      Power (jackson) 3.2 jackson      Current Hct setting 34.3         Primary Controller    Serial number [id number]      Low flow alarm setting --      High watt alarm setting --      EBB: Patient use 11      Replace in 29 Months         Backup Controller    Serial number [id number]      EBB: Patient use 8      Replace EBB in 29 Months      Speed & HCT match primary controller --         VAD Interrogation    Alarms reported by patient N      Unexpected alarms noted upon interrogation None      PI events Frequent  PI 2.3-8.3, rare speed drops, hx 2 days      Damage to equipment is noted N      Action taken Reviewed proper equipment care and maintenance         Driveline Exit Site    Dressing change done Y      Driveline properly secured Yes      DLES assessment c/d/i      Dressing used Weekly kit      Frequency patient changes dressing Weekly      Dressing modifications --      Dressing change supplier --                      Education Complete: Yes   Charge the BACKUP controller s backup battery every 6 months  Perform a self test on BACKUP every 6 months  Change the MPU s batteries every 6 months:Yes  Have equipment serviced yearly (if applicable):Yes    Pt is 8 weeks post discharge from VAD implant hospitalization. All questions and concerns addressed in visit.

## 2025-01-06 NOTE — LETTER
2025      RE: Duane C Johnson  [id number] [facility] [id number]       Dear Colleague,    Thank you for the opportunity to participate in the care of your patient, Duane C Johnson, at the [facility] HEART [facility]. Please see a copy of my visit note below.    [facility]  Advanced Heart Failure Clinic    Patient Name: Duane C Johnson   : 1950   Date of Visit: 2025    Primary Care Physician: Jose Coles MD     Referring Physician: No ref. provider found   Reason for Visit: LVAD    Dear Dr. Sanket MD     I had the pleasure of seeing Duane C Johnson today in the [facility]. As you know Duane C Johnson is a pleasant 74 year old year old male, who presents today for further evaluation of LVAD.    Duane has a history of end-stage cardiomyopathy, history of coronary artery disease, previous STEMI, history of VT VF arrest, status post placement of ICD, history of A-fib a flutter with previous cardioversion, status post HeartMate 3 LVAD implantation 2024.    Duane was discharged to ARU on 10/5/24. There, he continued to have frequent low flow alarms with labile BPs and has required frequent adjustments to BP medications, initiation of florinef, as well as IVF and increased PO intake. Seen in clinic on 10/23 where he was presyncopal with MAPs in 40s. He was sent from clinic to ED for further evaluation. Admitted 10/23/24 for further workup of hypotension, hypovolemia, and low flows. Underwent stroke eval for neurological changes with negative CT. Symptoms improved following 2L IVF and improved MAPs. He was resumed on low doses of GDMT/afterload reduction. During this hospitalization continued to have several more runs of low flows. MAPs/meds optimized and prior to discharge he was without low flows/high PIs x 3 days. Controlled was also switched to low flow controlled.  Discharge was delayed due to needing additional LVAD education. Consulted neuropsych due to concerns for cognitive changes - Alzheimer's disease or possibly mixed pathology (mild cognitive impairment).     Following discharge, he presented to the emergency room on 11/10/2024 with witnessed syncopal episode, feeling lightheaded which led to a fall to the ground but he did not strike his head or suffer any injury.  He reported that he did not lose consciousness although EMS reported that family witnessed a brief loss of consciousness episode.  The ER spoke to Dr. Nicolas, who suggested aggressive hydration as he is known to have hypovolemia and orthostasis and he had not had much to eat or drink that day before this episode. He did not have a low flow alarm then.    We have been seeing him in clinic frequently, and have mostly cut back on some of his anti hypertensives. He saw me last on 12/20/24    ____________________    Today he reports feeling well. He continues to have some episodes of lightheadedness, especially if he gets up quickly. But no fainting episodes or the shaking he previously noted since last visit. When he feels this coming on, he sits down quickly and it passes quickly.     Few days back he and his wife moved the LVAD driveline anchor as it was pulling it being close to the skin and it is not pulling anymore.     He is drinking a lot (gatorade), and he and his wife confirm that he is drinking at least 100 - 120 oz of fluid per day water/ gatorade, milk, one 14 oz of Core 42 protein drink, has cut down on mountain dew to less than one per day. His low flow alarm limit is set to 2L/min, last alarm was on 12/8/24, very brief.    He is mostly not using his walker, and reports he is using it if walking further than just going from couch to bathroom (about 20 feet) when he holds on to walls/doorways. His wife is with him 24x7, she works from home.    He has started cardiac rehab last week, planning to go  MWF    Home BPs - Doppler at home MAPs in 80s mm Hg usually, once was 104 and on recheck was 90s, sometimes in 65-70s  Home weights - not checking    ROS:  A complete 10-point ROS was negative except as above.    PAST MEDICAL HISTORY:  Past Medical History:   Diagnosis Date     Benign essential hypertension      CAD (coronary artery disease)      Chronic systolic heart failure (H)      Hypertension      ST elevation myocardial infarction (STEMI), unspecified artery (H)      Ventricular fibrillation (H)        PAST SURGICAL HISTORY:  Past Surgical History:   Procedure Laterality Date     ANESTHESIA CARDIOVERSION N/A 9/5/2024    Procedure: Anesthesia cardioversion;  Surgeon: GENERIC ANESTHESIA PROVIDER;  Location: [facility]     ANESTHESIA CARDIOVERSION N/A 9/30/2024    Procedure: Anesthesia cardioversion;  Surgeon: GENERIC ANESTHESIA PROVIDER;  [facility]     COLONOSCOPY N/A 3/23/2023    Procedure: COLONOSCOPY, FLEXIBLE, WITH LESION REMOVAL USING SNARE;  Surgeon: Jose Faustin MD;  Location: [facility]     CV CENTRAL VENOUS CATHETER PLACEMENT N/A 10/26/2023    Procedure: Central Venous Catheter Placement;  Surgeon: Rob Lyles MD;  Location:  HEART CARDIAC CATH LAB     CV CORONARY ANGIOGRAM N/A 10/27/2023    Procedure: Coronary Angiogram;  Surgeon: Rob Lyles MD;  Location:  HEART CARDIAC CATH LAB     CV CORONARY ANGIOGRAM N/A 10/26/2023    Procedure: Coronary Angiogram;  Surgeon: Rob Lyles MD;  Location: [facility] CARDIAC CATH LAB     CV LEFT HEART CATH N/A 10/26/2023    Procedure: Left Heart Catheterization;  Surgeon: Rob Lyles MD;  Location:  HEART CARDIAC CATH LAB     CV PCI N/A 10/27/2023    Procedure: Percutaneous Coronary Intervention;  Surgeon: Rob Lyles MD;  Location: [facility] CARDIAC CATH LAB     CV PCI STENT DRUG ELUTING N/A 10/26/2023    Procedure: Percutaneous Coronary Intervention Stent;  Surgeon: Rob Lyles MD;   Location:  HEART CARDIAC CATH LAB     CV RIGHT HEART CATH MEASUREMENTS RECORDED N/A 5/6/2024    Procedure: Heart Cath Right Heart Cath;  Surgeon: Rob Lyles MD;  Location:  HEART CARDIAC CATH LAB     CV RIGHT HEART CATH MEASUREMENTS RECORDED N/A 9/3/2024    Procedure: Right Heart Catheterization;  Surgeon: Aaron Mesa MD;  Location:  HEART CARDIAC CATH LAB     CV RIGHT HEART CATH MEASUREMENTS RECORDED N/A 10/24/2024    Procedure: Right Heart Catheterization;  Surgeon: Haile Braden MD;  Location:  HEART CARDIAC CATH LAB     EP ICD INSERT SINGLE N/A 5/8/2024    Procedure: Implantable Cardioverter Defibrillator Device & Lead Implant Dual;  Surgeon: Guero Black MD;  Location:  HEART CARDIAC CATH LAB     INJECTION, HYDROGEL SPACER N/A 4/22/2024    Procedure: INJECTION, HYDROGEL SPACER AND FIDUCIAL MARKER PLACEMENT;  Surgeon: Stanislaw Barros MD;  Location: [facility]     INSERT VENTRICULAR ASSIST DEVICE LEFT (HEARTMATE II) N/A 9/18/2024    Procedure: Median Sternotomy, INSERTION of LEFT VENTRICULAR ASSIST DEVICE (HEARTMATE III), cardiopulmonary bypass, transesophageal echocardiogram performed by anesthesia.;  Surgeon: Mulvihill, Michael, MD;  Location: [facility]     IRRIGATION AND DEBRIDEMENT CHEST WASHOUT, COMBINED Right 9/18/2024    Procedure: Exploration of chest, chest washout;  Surgeon: Mulvihill, Michael, MD;  Location: [facility]       FAMILY HISTORY:  Family History   Problem Relation Age of Onset     Other Cancer Mother      Obesity Mother      GI problems Father      Uterine Cancer Sister        SOCIAL HISTORY:  Social History     Socioeconomic History     Marital status:      Spouse name: None     Number of children: None     Years of education: None     Highest education level: None   Tobacco Use     Smoking status: Former     Current packs/day: 0.00     Average packs/day: 0.3 packs/day for 30.0 years (7.5 ttl pk-yrs)     Types: Cigarettes     Start date:  10/26/1993     Quit date: 10/26/2023     Years since quittin.0     Passive exposure: Past     Smokeless tobacco: Never     Tobacco comments:     Quit 10/26/2023   Vaping Use     Vaping status: Never Used   Substance and Sexual Activity     Alcohol use: Yes     Comment: 1-2 beers per day     Drug use: No     Sexual activity: Yes     Partners: Female     Birth control/protection: None   Other Topics Concern     Parent/sibling w/ CABG, MI or angioplasty before 65F 55M? No     Social Drivers of Health     Financial Resource Strain: Low Risk  (10/23/2024)    Financial Resource Strain      Within the past 12 months, have you or your family members you live with been unable to get utilities (heat, electricity) when it was really needed?: No   Food Insecurity: Low Risk  (10/23/2024)    Food Insecurity      Within the past 12 months, did you worry that your food would run out before you got money to buy more?: No      Within the past 12 months, did the food you bought just not last and you didn t have money to get more?: No   Transportation Needs: Low Risk  (10/23/2024)    Transportation Needs      Within the past 12 months, has lack of transportation kept you from medical appointments, getting your medicines, non-medical meetings or appointments, work, or from getting things that you need?: No   Physical Activity: Inactive (2023)    Exercise Vital Sign      Days of Exercise per Week: 0 days      Minutes of Exercise per Session: 0 min   Social Connections: Unknown (2023)    Social Connection and Isolation Panel [NHANES]      Marital Status:    Interpersonal Safety: Low Risk  (10/23/2024)    Interpersonal Safety      Do you feel physically and emotionally safe where you currently live?: Yes      Within the past 12 months, have you been hit, slapped, kicked or otherwise physically hurt by someone?: No      Within the past 12 months, have you been humiliated or emotionally abused in other ways by your  partner or ex-partner?: No   Recent Concern: Interpersonal Safety - High Risk (10/17/2024)    Interpersonal Safety      Do you feel physically and emotionally safe where you currently live?: No      Within the past 12 months, have you been hit, slapped, kicked or otherwise physically hurt by someone?: No      Within the past 12 months, have you been humiliated or emotionally abused in other ways by your partner or ex-partner?: No   Housing Stability: Low Risk  (10/23/2024)    Housing Stability      Do you have housing? : Yes      Are you worried about losing your housing?: No   Recent Concern: Housing Stability - High Risk (9/1/2024)    Housing Stability      Do you have housing? : No      Are you worried about losing your housing?: No       MEDICATIONS:  Current Outpatient Medications   Medication Sig Dispense Refill     acetaminophen (TYLENOL) 325 MG tablet Take 2 tablets (650 mg) by mouth every 4 hours as needed for other (For optimal non-opioid multimodal pain management to improve pain control.).       amiodarone (PACERONE) 200 MG tablet Take 1 tablet (200 mg) by mouth daily. 90 tablet 3     amLODIPine (NORVASC) 10 MG tablet Take 0.5 tablets (5 mg) by mouth daily. 45 tablet 3     atorvastatin (LIPITOR) 80 MG tablet Take 1 tablet (80 mg) by mouth every evening. 90 tablet 3     digoxin (LANOXIN) 62.5 MCG tablet Take 1 tablet (62.5 mcg) by mouth daily. 30 tablet 11     escitalopram (LEXAPRO) 10 MG tablet Take 1 tablet (10 mg) by mouth or Feeding Tube daily. 90 tablet 3     gabapentin (NEURONTIN) 100 MG capsule Take 1 capsule (100 mg) by mouth or Feeding Tube at bedtime. 90 capsule 3     hydrALAZINE (APRESOLINE) 25 MG tablet Take 1 tablet (25 mg) by mouth 3 times daily. 180 tablet 3     magnesium hydroxide (MILK OF MAGNESIA) 400 MG/5ML suspension Take 30 mLs by mouth daily as needed for constipation (Use if polyethylene glycol (Miralax) is not effective after 24 hours.). 354 mL 0     magnesium oxide (MAG-OX) 400 MG  tablet Take 1 tablet (400 mg) by mouth daily. 90 tablet 3     melatonin 10 MG TABS tablet Take 1 tablet (10 mg) by mouth every evening.       multivitamin w/minerals (THERA-VIT-M) tablet Take 1 tablet by mouth daily. 90 tablet 3     warfarin ANTICOAGULANT (COUMADIN) 2.5 MG tablet Take 1 tablet (2.5 mg) by mouth every evening. 90 tablet 3     No current facility-administered medications for this visit.        ALLERGIES:     Allergies   Allergen Reactions     Brilinta [Ticagrelor] Other (See Comments) and Difficulty breathing     Per pt and spouse, hyperventilation.     Clonazepam Other (See Comments)     Per spouse, acted like a zombie and he was shaky, could barely talk.       PHYSICAL EXAM:  BP (!) 62/0 (BP Location: Left arm, Patient Position: Chair, Cuff Size: Adult Small)   Pulse 74   Wt 68.7 kg (151 lb 7.2 oz)   SpO2 95%   BMI 23.72 kg/m      Gen: alert, interactive, NAD  Neck: supple, no JVD  CV: VAD hum+  Chest: CTAB, no wheezes or crackles  Abd: soft, NT, ND, +BS, driveline dressing in place  Ext: no LE edema    LABS:    CMP  Last Comprehensive Metabolic Panel:  Sodium   Date Value Ref Range Status   01/06/2025 135 135 - 145 mmol/L Final   05/26/2021 141 133 - 144 mmol/L Final     Sodium Whole Blood   Date Value Ref Range Status   10/27/2023 138 135 - 145 mmol/L Final     Potassium   Date Value Ref Range Status   01/06/2025 3.8 3.4 - 5.3 mmol/L Final   05/26/2021 4.5 3.4 - 5.3 mmol/L Final     Potassium Whole Blood   Date Value Ref Range Status   09/23/2024 2.5 (LL) 3.4 - 5.3 mmol/L Final     Potassium POCT   Date Value Ref Range Status   10/23/2024 3.8 3.4 - 5.3 mmol/L Final     Chloride   Date Value Ref Range Status   01/06/2025 98 98 - 107 mmol/L Final   05/26/2021 105 94 - 109 mmol/L Final     Chloride Whole Blood   Date Value Ref Range Status   10/27/2023 103 94 - 109 mmol/L Final     Carbon Dioxide   Date Value Ref Range Status   05/26/2021 29 20 - 32 mmol/L Final     Carbon Dioxide (CO2)   Date  Value Ref Range Status   01/06/2025 26 22 - 29 mmol/L Final     Carbon Dioxide Whole Blood   Date Value Ref Range Status   10/27/2023 27 20 - 32 mmol/L Final     Anion Gap   Date Value Ref Range Status   01/06/2025 11 7 - 15 mmol/L Final   05/26/2021 7 3 - 14 mmol/L Final     Glucose   Date Value Ref Range Status   01/06/2025 105 (H) 70 - 99 mg/dL Final   05/26/2021 125 (H) 70 - 99 mg/dL Final     GLUCOSE BY METER POCT   Date Value Ref Range Status   11/09/2024 111 (H) 70 - 99 mg/dL Final     Urea Nitrogen   Date Value Ref Range Status   01/06/2025 9.1 8.0 - 23.0 mg/dL Final   05/26/2021 41 (H) 7 - 30 mg/dL Final     Creatinine   Date Value Ref Range Status   01/06/2025 0.87 0.67 - 1.17 mg/dL Final   05/26/2021 0.80 0.66 - 1.25 mg/dL Final     GFR Estimate   Date Value Ref Range Status   01/06/2025 >90 >60 mL/min/1.73m2 Final     Comment:     eGFR calculated using 2021 CKD-EPI equation.   05/26/2021 90 >60 mL/min/[1.73_m2] Final     Comment:     Non  GFR Calc  Starting 12/18/2018, serum creatinine based estimated GFR (eGFR) will be   calculated using the Chronic Kidney Disease Epidemiology Collaboration   (CKD-EPI) equation.       GFR, ESTIMATED POCT   Date Value Ref Range Status   10/23/2024 >60 >60 mL/min/1.73m2 Final     Calcium   Date Value Ref Range Status   01/06/2025 8.9 8.8 - 10.4 mg/dL Final     Comment:     Reference intervals for this test were updated on 7/16/2024 to reflect our healthy population more accurately. There may be differences in the flagging of prior results with similar values performed with this method. Those prior results can be interpreted in the context of the updated reference intervals.   05/26/2021 8.3 (L) 8.5 - 10.1 mg/dL Final     Bilirubin Total   Date Value Ref Range Status   01/06/2025 0.5 <=1.2 mg/dL Final   03/20/2018 0.5 0.2 - 1.3 mg/dL Final     Alkaline Phosphatase   Date Value Ref Range Status   01/06/2025 123 40 - 150 U/L Final   03/20/2018 82 40 - 150 U/L  "Final     ALT   Date Value Ref Range Status   01/06/2025 24 0 - 70 U/L Final   03/20/2018 22 0 - 70 U/L Final     AST   Date Value Ref Range Status   01/06/2025 25 0 - 45 U/L Final   03/20/2018 20 0 - 45 U/L Final       NT-proBNP       LDH      TROP  No results found for: \"TROPI\", \"TROPONIN\", \"TROPR\", \"TROPN\"    CBC  CBC RESULTS:   Recent Labs   Lab Test 11/13/24  0910   WBC 5.4   RBC 3.49*   HGB 10.6*   HCT 31.3*   MCV 90   MCH 30.4   MCHC 33.9   RDW 15.1*          LIPIDS  Recent Labs   Lab Test 09/04/24  0626 08/31/24  0414   CHOL 85 83   HDL 29* 25*   LDL 45 46   TRIG 56 59       TSH  TSH   Date Value Ref Range Status   09/04/2024 2.45 0.30 - 4.20 uIU/mL Final       HBA1C  Lab Results   Component Value Date    A1C 5.4 09/18/2024    A1C 4.8 03/13/2023         CARDIAC DATA:    EKG:  10/23/24: Atrial fibrillation, Left anterior fascicular block, Possible Lateral infarct , age undetermined     Echo:  10/23/24  LVAD cannula was seen in the expected anatomic position in the LV apex.  HM3 at 5100RPM.  LVIDd 47mm.  Septum normal.  Aortic valve open intermittently. No AI.  Normal flow velocities.  Global right ventricular function is mildly to moderately reduced.  Small circumferential pericardial effusion is present without any hemodynamic significance.    Cardiac Catheterization:  [facility] 10/23/24  BSA 1.75 m2  /89/101 mmHg  RA --/--/6 mmHg  RV 30/3mmHg  PA 30/14/20 mmHg  PCWP --/--/9 mmHg  TD CO/CI 4.1/2.34   Goldy CO/CI 4.54/2.59   PVR 2.68 (TD)  PA sat 65%   PCW Oximeter sat 95%  Hgb 10.5 g/dL Right sided filling pressures are normal. Left sided filling pressures are normal. Normal PA pressures. Normal cardiac output level.       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 NYHA Class III.     10/23/24 RA 6, PA 40/14/20, PCW 9, Goldy CO/CI 4.54/2.59 at 5100 rpm     Fluid status: mostly has been hypo-volemic, and is now on an aggressive oral hydration regimen.  He is drinking fluid intake >120 ounce per day, mainly Gatorade/water, avoid soda,   ACEi/ARB/ARNi: off losartan due to hypotension  Vasodilator: hydralazine 25 mg q8h.  amlodipine 5 mg daily --> may need further reduction in the future  BB: deferred due to recent VAD. Sometimes misses mid day dose hydralazine  Aldosterone antagonist: STOPPED blane due to hypoNa  SGLT2i: STOPPED empagliflozin 10 mg due to propensity for hypovolemia    Also advised to increase salt intake in the food, salty snacks 3 times per day and increase added salt to the food.  As a next step may also need to consider adding salt tablets.  We discussed that all of this is the opposite of what we do for heart failure patients, but since his LVAD implant, he has robust urinary output and is auto diuresing, and with persistent low flow alarms, speed drops, dizziness, hypotension -would benefit from increased fluid and salt intake.  We discussed that none of these are perfect and may need continued adjustments and close monitoring based on his clinical status.    With above plans, he is slowly improving with no low flow alarms since 12/8/24, improving lightheadedness and no further syncope    He started cardiac rehab last week, plans to go MWF    SCD prophylaxis: ICD  MAP: goal 70-90, encouraging POs ulitize hold parameters. MAP lower today but will continue current regiment based on improved clinical status  LDH trends: stable   Anticoagulation: warfarin dosing per pharmacy, INR goal 2-3  Antiplatelet: ASA not indicated in LVAD population, > 6 months s/p STEMI    Other:   - No fluid restriction, encourage drinks with Na/electrolytes, increase  fluid intake to greater than 120 ounce per day, eat frequent salty snacks  - Patient has a low flow controller, will not alarm until flow <2. Hematocrit is set to match his true hematocrit  - Dr. Smith has reviewed inflow and outflow positioning, nothing to do      Left ventricular assist device interrogation: The patient's HeartMate II / III LVAD was interrogated today 25 .     I have personally interrogated the LVAD, reviewing all parameters and alarm trends as noted in the note.     Patient is euvolemic    Exam otherwise as noted in the note    Heartmate 3 LEFT VS  Flow (Lpm): 3.7 Lpm  Pulse Index (PI): 2.2 PI  Speed (rpm): 5000 rpm  Power (jackson): 3.2 jackson  Current Hct settin.3     Alarms were reviewed, and notable for none.  The driveline exit site was inspected, and showed no concerns.  All external components were inspected and showed no evidence of damage or malfunction.  No components were replaced.  No changes to VAD settings made               # Low flow alarms  # Elevated PIs  # Labile MAPs   # Suspected orthostatic hypotension, resolved  Low flows seems releated to hypertension and some hypovolemia as well. Have improved with re-timing hydralazine and after increasing oral fluids. CTA was done on  10/17/24 with no outflow graft ostruction. The outflow graft does make an acute angle as it approaches the aorta, discussed with Dr. Mulvihill, unlikely to be impacting the low flows at this time.  RHC with reasonable filling pressures as above. He has been asymptomatic.  - Decreased speed to 5000 on 10/27  - Losartan stopped previously for low BP  - SGLT2 inihibitor stopped for hypovolemia  - Hydralazine and amlodipine doses reduced as above  - Stopped fludrocortisone 0.1 mg daily previously  - Increase PO intake as above, specifically right before bed, non free-water sources due to hypoNa  - Temp drop in hematocrit on monitor 10/29-10/30, low-flow controller change out 10/30, changed  "hematocrit back 10/31  - This is now overall improved     # History of VT/VF arrest 2023  # AFib s/p DCCV 9/2024 and again 9/30/2024   Successfully cardioverted in early September for AF. Since then EKG suggested AF on 9/21. Tele is only available from as early as 9/22. The patient was started on hep gtt 9/21, but unclear if was in AF before this. While the patient was on hep gtt until INR was therapeutic, it is not possible to assess rhythm prior to 9/21. Systemic AC was off on 9/18-9/20. S/p MELBA and DCCV on 9/30 with conversion to sinus rhythm.  10/23 device interrogation shows persistent episode of AF since 10/10 with rates   - Continue amiodarone 200 mg daily  - S/p digoxin load, continue digoxin 62.5 mcg daily   - Ongoing a fib- given ongoing low flows- deferring cardioversion for now given reasonable rate control.   - AC as above     # Hypovolemia hyponatremia, stable  improving  - encourage high sodium PO intake/gatorade and salty foods     # Visual changes, resolved.   # Generalized weakness, improved   # Headache, resolved  Stoke code called 9/29 for visual changes - right eye with decreased upper half of vision and bluish haze. Resolved after 30 minutes. Seen by opthalmology and neuro. Negative head CT and CTA head and neck. He has had 3 episodes which last about 5 minutes before resolving completely.      On 10/9 Stroke code activated due to concern of generalized weakness, numbness and headache. LKW reported as 0715 when he worked with therapies and shortly after started feeling generally weak. Then around 1500 he had onset of moderate \"tension\" type headache and tingling in the bilateral fingertips. Per RRT URIAH patient now reports tingling has resolved and headache is improving. MAP 48, INR 2.2. Given absence of focal deficits and rapidly improving symptoms, opted to cancel stroke.  CT head negative, though did show chronic maxillary sinusitis.      Stroke code called 10/23 when seen in clinic " with report of new RUE weakness. He was notably hypotensive at this time with MAP ~45 with new right pronator drift. Evaluated in ED, CT and CTA head and neck negative. RUE weakness resolved.   - monitor for changes- no current symptoms    Started cardiac rehab Jan 2025        # Cognitive changes  - Formal neuropsych testing this admission reveiling some cognitive changes  - 24/7 care during his first 30 days, will likely not need therafter, but continue to assess with each LVAD appointment (per Ho Méndez note from 11/8 Duane has passed his VAD education)  - Needs formal driving assessment with DMV or OT prior to being cleared to drive, wife aware and also discussed with patient at time of discharge  - Repeat neuropsych testing in 1 year  - Referred to behavioral neurology in place      I spent 42 minutes in care of the patient today including obtaining recent medical history, personally reviewing recent cardiac testing and/or lab results, today's examination, discussion of testing results and care recommendations with patient.       Thank you for the opportunity to participate in this patient's care. Please feel free to reach out with any questions or concerns.      Ham Cruz MD, [facility]  Associate Professor of Medicine  Advanced Heart Failure, LVAD & Cardiac Transplant  Director, Cardio-Obstetrics  Cardio-Oncology  [facility]      Please do not hesitate to contact me if you have any questions/concerns.     Sincerely,     Ham Cruz MD

## 2025-01-08 ENCOUNTER — HOSPITAL ENCOUNTER (OUTPATIENT)
Dept: CARDIAC REHAB | Facility: CLINIC | Age: 75
Discharge: HOME | End: 2025-01-08
Attending: FAMILY MEDICINE
Payer: MEDICARE

## 2025-01-08 PROCEDURE — 93798 PHYS/QHP OP CAR RHAB W/ECG: CPT

## 2025-01-10 ENCOUNTER — HOSPITAL ENCOUNTER (OUTPATIENT)
Dept: CARDIAC REHAB | Facility: CLINIC | Age: 75
Discharge: HOME | End: 2025-01-10
Attending: FAMILY MEDICINE
Payer: MEDICARE

## 2025-01-10 PROCEDURE — 93798 PHYS/QHP OP CAR RHAB W/ECG: CPT

## 2025-01-13 ENCOUNTER — HOSPITAL ENCOUNTER (OUTPATIENT)
Dept: CARDIAC REHAB | Facility: CLINIC | Age: 75
Discharge: HOME | End: 2025-01-13
Attending: FAMILY MEDICINE
Payer: MEDICARE

## 2025-01-13 PROCEDURE — 93798 PHYS/QHP OP CAR RHAB W/ECG: CPT

## 2025-01-14 DIAGNOSIS — I50.22 CHRONIC SYSTOLIC HEART FAILURE (H): ICD-10-CM

## 2025-01-14 DIAGNOSIS — Z79.899 LONG TERM CURRENT USE OF AMIODARONE: ICD-10-CM

## 2025-01-14 DIAGNOSIS — Z95.811 LVAD (LEFT VENTRICULAR ASSIST DEVICE) PRESENT (H): Primary | ICD-10-CM

## 2025-01-14 RX ORDER — LIDOCAINE 40 MG/G
CREAM TOPICAL
OUTPATIENT
Start: 2025-01-14

## 2025-01-15 ENCOUNTER — HOSPITAL ENCOUNTER (OUTPATIENT)
Dept: CARDIAC REHAB | Facility: CLINIC | Age: 75
Discharge: HOME | End: 2025-01-15
Attending: FAMILY MEDICINE
Payer: MEDICARE

## 2025-01-15 PROCEDURE — 93798 PHYS/QHP OP CAR RHAB W/ECG: CPT

## 2025-01-16 DIAGNOSIS — I50.22 CHRONIC SYSTOLIC CONGESTIVE HEART FAILURE (H): Primary | ICD-10-CM

## 2025-01-16 DIAGNOSIS — Z79.01 ANTICOAGULATED ON WARFARIN: ICD-10-CM

## 2025-01-16 DIAGNOSIS — Z95.811 LVAD (LEFT VENTRICULAR ASSIST DEVICE) PRESENT (H): ICD-10-CM

## 2025-01-17 ENCOUNTER — HOSPITAL ENCOUNTER (OUTPATIENT)
Dept: CARDIAC REHAB | Facility: CLINIC | Age: 75
Discharge: HOME | End: 2025-01-17
Attending: FAMILY MEDICINE
Payer: MEDICARE

## 2025-01-17 PROCEDURE — 93798 PHYS/QHP OP CAR RHAB W/ECG: CPT

## 2025-01-20 ENCOUNTER — CARE COORDINATION (OUTPATIENT)
Dept: CARDIOLOGY | Facility: CLINIC | Age: 75
End: 2025-01-20
Payer: MEDICARE

## 2025-01-20 ENCOUNTER — HOSPITAL ENCOUNTER (OUTPATIENT)
Dept: CARDIAC REHAB | Facility: CLINIC | Age: 75
Discharge: HOME | End: 2025-01-20
Attending: FAMILY MEDICINE
Payer: MEDICARE

## 2025-01-20 VITALS — WEIGHT: 165 LBS | BODY MASS INDEX: 25.84 KG/M2

## 2025-01-20 PROCEDURE — 93798 PHYS/QHP OP CAR RHAB W/ECG: CPT

## 2025-01-20 NOTE — PROGRESS NOTES
Called patient to check in 12  weeks post discharge from VAD implant hospitalization.   Pt reports VAD parameters as follows:         01/20/25 1348   Heartmate 3 LEFT VS   Flow (Lpm) 3.5 Lpm   Pulse Index (PI) 3.5 PI   Speed (rpm) 5000 rpm   Power (jackson) 3.2 jackson     These parameters are at baseline.   Pt states current weight is  165lb (with equipment and clothing)  which is at baseline.   Pt is checking MAP's at home. Most recent MAP is 62. 65-70 at cardiac rehab. No fainting spells.  Lightheaded when he gets up from chair too quickly.  Reviewed medications and answered any questions.   Patient reports sleeping well and has no anxiety since being home with their LVAD.   Patient is able to move around the house and care for himself  - has no longer been using chair lift to get up and downstairs at home .  Continuing cardiac rehab, loses steam after ~ 1 hour and needs help getting back to his car for leaving. Has a hard time with treadmill- legs just give out.     Discussed specific new problems/stressors since being discharged from the hospital: continuing to work at cardiac rehab and regain strength.      Empathized with patient and reviewed coping strategies: enlisting support from friends and love ones, attending patient and caregiver support groups, reviewing LVAD educational materials to reinforce knowledge, and talking about concerns with family/care providers/trusted others. Encouraged pt to page VAD Coordinator with any issues or questions. Pt verbalizes understanding.    Patient had questions about batteries- wondering what he is supposed to with his four batteries in the box- reminded patient that he should be equally using all batteries- reminded     Meeting with oral surgeon in [facility], reminded patient that he should make sure they are aware that he is on coumadin, if he needs anesthesia that it should be done here at the [facility].

## 2025-01-21 NOTE — PROGRESS NOTES
[facility] Cardiology   VAD Clinic      HPI:   Mr. Aguila is a 74 year old male with medical history pertinent for end-stage cardiomyopathy, history of coronary artery disease, previous STEMI, history of VT VF arrest, status post placement of ICD, history of A-fib a flutter with previous cardioversion, status post HeartMate 3 LVAD implantation 9/18/2024. He presents to clinic for routine follow up.     Duane was discharged to ARU on 10/5/24. There, he continued to have frequent low flow alarms with labile BPs and has required frequent adjustments to BP medications, initiation of florinef, as well as IVF and increased PO intake. Seen in clinic on 10/23/24 where he was presyncopal with MAPs in 40s. He was sent from clinic to ED for further evaluation. Admitted 10/23/24 for further workup of hypotension, hypovolemia, and low flows. Underwent stroke eval for neurological changes with negative CT. Symptoms improved following 2L IVF and improved MAPs. He was resumed on low doses of GDMT/afterload reduction. During this hospitalization continued to have several more runs of low flows. MAPs/meds optimized and prior to discharge he was without low flows/high PIs x 3 days. Controlled was also switched to low flow controller. Discharge was delayed due to needing additional LVAD education. Consulted neuropsych due to concerns for cognitive changes - Alzheimer's disease or possibly mixed pathology (mild cognitive impairment).      Following discharge, he presented to the emergency room on 11/10/2024 with witnessed syncopal episode, feeling lightheaded which led to a fall to the ground but he did not strike his head or suffer any injury.  He reported that he did not lose consciousness although EMS reported that family witnessed a brief loss of consciousness episode.  The ER spoke to Dr. Nicolas, who suggested aggressive hydration as he is known to have hypovolemia and orthostasis and he had not had much to eat or drink that day  before this episode. He did not have a low flow alarm then.    Duane has since followed closely with Dr. Cruz. Last seen in clinic 1/6/25. At that time reported a few episodes of lightheadedness, but denied syncope. Drinking 100-120 oz (~ 3L) daily. His low flow alarm limit is set to 2L/min.      He is mostly not using his walker, and reports he is using it if walking further than just going from couch to bathroom (about 20 feet) when he holds on to walls/doorways. His wife is with him 24x7, she works from home.    Today, Duane reports feeling quite well. He is attending cardiac rehab 3 days/week. Stamina and strength are improving. He reports that he can only do about 2 minutes on the treadmill before fatiguing, but able to go longer on the push/pull exercise bike. Home weights stable around 145 lb. Rare episodes of lightheadedness, but only sometimes when he stands too quickly. Has not had any syncopal or near syncopal episodes since November. Denies changes in speech, fever, chills, chest pain, SOB, DIOP, PND, and LE edema.   No fevers or chills. No nausea, vomiting, diarrhea.   Denies any concerns at LVAD drive line site. Driveline redness or pain.  No driveline drainage. No LVAD alarms. Last low flow alarm 12/8.   No blood in the urine or blood in the stool or prolonged nosebleeds.  No headaches or vision changes. No stroke symptoms.    Cardiac Medications:   - Amiodarone 200 mg daily   - Amlodipine 5 mg daily   - Atorvastatin 80 mg daily   - Digoxin 62.5 mcg daily   - Hydralazine 25 mg TID  - Warfarin     PAST MEDICAL HISTORY:  Past Medical History:   Diagnosis Date    Benign essential hypertension     CAD (coronary artery disease)     Chronic systolic heart failure (H)     Hypertension     ST elevation myocardial infarction (STEMI), unspecified artery (H)     Ventricular fibrillation (H)        FAMILY HISTORY:  Family History   Problem Relation Age of Onset    Other Cancer Mother     Obesity Mother     GI  problems Father     Uterine Cancer Sister        SOCIAL HISTORY:  Social History     Socioeconomic History    Marital status:    Tobacco Use    Smoking status: Former     Current packs/day: 0.00     Average packs/day: 0.3 packs/day for 30.0 years (7.5 ttl pk-yrs)     Types: Cigarettes     Start date: 10/26/1993     Quit date: 10/26/2023     Years since quittin.2     Passive exposure: Past    Smokeless tobacco: Never    Tobacco comments:     Quit 10/26/2023   Vaping Use    Vaping status: Never Used   Substance and Sexual Activity    Alcohol use: Yes     Comment: 1-2 beers per day    Drug use: No    Sexual activity: Yes     Partners: Female     Birth control/protection: None   Other Topics Concern    Parent/sibling w/ CABG, MI or angioplasty before 65F 55M? No     Social Drivers of Health     Financial Resource Strain: Low Risk  (10/23/2024)    Financial Resource Strain     Within the past 12 months, have you or your family members you live with been unable to get utilities (heat, electricity) when it was really needed?: No   Food Insecurity: Low Risk  (10/23/2024)    Food Insecurity     Within the past 12 months, did you worry that your food would run out before you got money to buy more?: No     Within the past 12 months, did the food you bought just not last and you didn t have money to get more?: No   Transportation Needs: Low Risk  (10/23/2024)    Transportation Needs     Within the past 12 months, has lack of transportation kept you from medical appointments, getting your medicines, non-medical meetings or appointments, work, or from getting things that you need?: No   Physical Activity: Inactive (2023)    Exercise Vital Sign     Days of Exercise per Week: 0 days     Minutes of Exercise per Session: 0 min   Social Connections: Unknown (2023)    Social Connection and Isolation Panel [NHANES]     Marital Status:    Interpersonal Safety: Low Risk  (10/23/2024)    Interpersonal Safety      Do you feel physically and emotionally safe where you currently live?: Yes     Within the past 12 months, have you been hit, slapped, kicked or otherwise physically hurt by someone?: No     Within the past 12 months, have you been humiliated or emotionally abused in other ways by your partner or ex-partner?: No   Recent Concern: Interpersonal Safety - High Risk (10/17/2024)    Interpersonal Safety     Do you feel physically and emotionally safe where you currently live?: No     Within the past 12 months, have you been hit, slapped, kicked or otherwise physically hurt by someone?: No     Within the past 12 months, have you been humiliated or emotionally abused in other ways by your partner or ex-partner?: No   Housing Stability: Low Risk  (10/23/2024)    Housing Stability     Do you have housing? : Yes     Are you worried about losing your housing?: No   Recent Concern: Housing Stability - High Risk (9/1/2024)    Housing Stability     Do you have housing? : No     Are you worried about losing your housing?: No       CURRENT MEDICATIONS:  Current Outpatient Medications   Medication Sig Dispense Refill    acetaminophen (TYLENOL) 325 MG tablet Take 2 tablets (650 mg) by mouth every 4 hours as needed for other (For optimal non-opioid multimodal pain management to improve pain control.).      amiodarone (PACERONE) 200 MG tablet Take 1 tablet (200 mg) by mouth daily. 90 tablet 3    amLODIPine (NORVASC) 10 MG tablet Take 0.5 tablets (5 mg) by mouth daily. 45 tablet 3    atorvastatin (LIPITOR) 80 MG tablet Take 1 tablet (80 mg) by mouth every evening. 90 tablet 3    digoxin (LANOXIN) 62.5 MCG tablet Take 1 tablet (62.5 mcg) by mouth daily. 30 tablet 11    escitalopram (LEXAPRO) 10 MG tablet Take 1 tablet (10 mg) by mouth or Feeding Tube daily. 90 tablet 3    gabapentin (NEURONTIN) 100 MG capsule Take 1 capsule (100 mg) by mouth or Feeding Tube at bedtime. 90 capsule 3    hydrALAZINE (APRESOLINE) 25 MG tablet Take 1  tablet (25 mg) by mouth 3 times daily. 180 tablet 3    magnesium hydroxide (MILK OF MAGNESIA) 400 MG/5ML suspension Take 30 mLs by mouth daily as needed for constipation (Use if polyethylene glycol (Miralax) is not effective after 24 hours.). 354 mL 0    magnesium oxide (MAG-OX) 400 MG tablet Take 1 tablet (400 mg) by mouth daily. 90 tablet 3    melatonin 10 MG TABS tablet Take 1 tablet (10 mg) by mouth every evening.      multivitamin w/minerals (THERA-VIT-M) tablet Take 1 tablet by mouth daily. 90 tablet 3    warfarin ANTICOAGULANT (COUMADIN) 2.5 MG tablet Take 1 tablet (2.5 mg) by mouth every evening. 90 tablet 3     No current facility-administered medications for this visit.       ROS:   CONSTITUTIONAL: Denies fever, chills, fatigue, or weight fluctuations.   HEENT: Denies headache, vision changes, and changes in speech.   CV: Refer to HPI.   PULMONARY:Refer to HPI.   GI:Denies nausea, vomiting, diarrhea, and abdominal pain. Bowel movements are regular.   :Denies urinary alterations, dysuria, urinary frequency, hematuria, and abnormal drainage.   EXT:Denies lower extremity edema.   SKIN:Denies abnormal rashes or lesions.   MUSCULOSKELETAL:Denies upper or lower extremity weakness and pain.   NEUROLOGIC:Denies lightheadedness, dizziness, seizures, or upper or lower extremity paresthesia.     EXAM:  BP (!) 70/0 (BP Location: Left arm, Patient Position: Chair, Cuff Size: Adult Small)   Wt 68.9 kg (151 lb 14.4 oz)   SpO2 95%   BMI 23.79 kg/m     GENERAL: Appears comfortable, in no distress.  HEENT: Eye symmetrical and without discharge or icterus bilaterally. Mucous membranes moist and without lesions.  NECK: Supple, JVD <6 cm.   CV: + LVAD hum  RESPIRATORY: Respirations regular, even, and unlabored. Lungs CTA throughout.   GI: Soft and non distended with normoactive bowel sounds present in all quadrants. No tenderness, rebound, guarding. No organomegaly.   EXTREMITIES: no peripheral edema. Non-pulsatile.    NEUROLOGIC: Alert and orientated x 3.  No focal deficits.   MUSCULOSKELETAL: No joint swelling or tenderness.   SKIN: No jaundice. No rashes or lesions. Driveline dressing c/d/i.    Labs - as reviewed in clinic with patient today:  CBC RESULTS:  Lab Results   Component Value Date    WBC 7.5 01/06/2025    WBC 12.2 (H) 05/26/2021    RBC 3.88 (L) 01/06/2025    RBC 3.60 (L) 05/26/2021    HGB 11.7 (L) 01/06/2025    HGB 12.0 (L) 05/26/2021    HCT 34.3 (L) 01/06/2025    HCT 34.8 (L) 05/26/2021    MCV 88 01/06/2025    MCV 97 05/26/2021    MCH 30.2 01/06/2025    MCH 33.3 (H) 05/26/2021    MCHC 34.1 01/06/2025    MCHC 34.5 05/26/2021    RDW 15.2 (H) 01/06/2025    RDW 13.2 05/26/2021     01/06/2025     05/26/2021       CMP RESULTS:  Lab Results   Component Value Date     01/06/2025     10/27/2023     05/26/2021    POTASSIUM 3.8 01/06/2025    POTASSIUM 3.8 10/23/2024    POTASSIUM 2.5 (LL) 09/23/2024    POTASSIUM 4.5 05/26/2021    CHLORIDE 98 01/06/2025    CHLORIDE 103 10/27/2023    CHLORIDE 105 05/26/2021    CO2 26 01/06/2025    CO2 27 10/27/2023    CO2 29 05/26/2021    ANIONGAP 11 01/06/2025    ANIONGAP 7 05/26/2021     (H) 01/06/2025     (H) 11/09/2024     (H) 05/26/2021    BUN 9.1 01/06/2025    BUN 41 (H) 05/26/2021    CR 0.87 01/06/2025    CR 0.80 05/26/2021    GFRESTIMATED >90 01/06/2025    GFRESTIMATED >60 10/23/2024    GFRESTIMATED 90 05/26/2021    GFRESTBLACK >90 05/26/2021    PRANAV 8.9 01/06/2025    PRANAV 8.3 (L) 05/26/2021    BILITOTAL 0.5 01/06/2025    BILITOTAL 0.5 03/20/2018    ALBUMIN 3.7 01/06/2025    ALBUMIN 3.7 03/20/2018    ALKPHOS 123 01/06/2025    ALKPHOS 82 03/20/2018    ALT 24 01/06/2025    ALT 22 03/20/2018    AST 25 01/06/2025    AST 20 03/20/2018        INR RESULTS:  Lab Results   Component Value Date    INR 2.73 (H) 01/06/2025    INR 1.15 (H) 05/26/2021       Lab Results   Component Value Date    MAG 1.8 12/20/2024     Lab Results   Component Value  Date    NTBNPI 22,172 (H) 09/04/2024     Lab Results   Component Value Date    NTBNP 2,299 (H) 01/06/2025         Cardiac Diagnostics    10/24/24 RHC    Right sided filling pressures are normal.    Left sided filling pressures are normal.    Normal PA pressures.    Normal cardiac output level.    Hemodynamic data has been modified in Epic per physician review.  RIGHT HEART CATHETERIZATION:    === Baseline ====    BSA 1.75 m2  /89/101 mmHg  RA --/--/6 mmHg  RV 30/3mmHg  PA 30/14/20 mmHg  PCWP --/--/9 mmHg  TD CO/CI 4.1/2.34   Goldy CO/CI 4.54/2.59   PVR 2.68 (TD)  PA sat 65%     10/23/24 Echo  nterpretation Summary  LVAD cannula was seen in the expected anatomic position in the LV apex.  HM3 at 5100RPM.  LVIDd 47mm.  Septum normal.  Aortic valve open intermittently. No AI.  Normal flow velocities.  Global right ventricular function is mildly to moderately reduced.  Small circumferential pericardial effusion is present without any hemodynamic  significance.      Assessment and Plan:   Mr. Aguila is a 74 year old male with medical history pertinent for end-stage cardiomyopathy, history of coronary artery disease, previous STEMI, history of VT VF arrest, status post placement of ICD, history of A-fib a flutter with previous cardioversion, status post HeartMate 3 LVAD implantation 9/18/2024. He presents to clinic for routine follow up.     Appearing and feeling well. Duane looks the best I've seen in a long time. He has been diligent with his fluid and salt intake and has been successful in avoiding any recent low flow alarms. MAPs are at goal. Review of today's labs stable-lytes and renal function wnl, BNP trending down, 1,782 today. We will defer any medication changes today.     Has oral yuval and will need to be seen by oral surgery for assistance in fitting for dentures. Referral placed.     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 NYHA Class III.  10/23/24 RHC: RA 6, PA 40/14/20, PCW 9, Goldy CO/CI 4.54/2.59 at 5100 rpm     Fluid status: euvolemic, He is drinking fluid intake >120 ounce per day, mainly Gatorade/water, avoid soda  ACEi/ARB/ARNi: off losartan due to hypotension  Vasodilator: hydralazine 25 mg q8h.  amlodipine 5 mg daily --> may need further reduction in the future  BB: deferred due to recent VAD. Sometimes misses mid day dose hydralazine  Aldosterone antagonist: STOPPED blane due to hypoNa  SGLT2i: STOPPED empagliflozin 10 mg due to propensity for hypovolemia  SCD prophylaxis: ICD  MAP: goal 70-90, encouraging POs ulitize hold parameters. MAP lower today but will continue current regiment based on improved clinical status  LDH trends: stable   Anticoagulation: warfarin dosing per pharmacy, INR goal 2-3  Antiplatelet: ASA not indicated in LVAD population, > 6 months s/p STEMI    Other:   - No fluid restriction, encourage drinks with Na/electrolytes, increase fluid intake to greater than 120 ounce per day, eat frequent salty snacks  - Patient has a low flow controller, will not alarm until flow <2. Hematocrit is set to match his true hematocrit  - Dr. Smith has reviewed inflow and outflow positioning, nothing to do      VAD interrogation 01/22/25: VAD interrogation reviewed with VAD coordinator. Agree with findings. occasional PI events, no power spikes, speed drops, or other findings suspicious of pump malfunction noted.      # Low flow alarms  # Elevated PIs  # Labile MAPs   # Suspected orthostatic hypotension, resolved  Low flows seems releated to hypertension and some hypovolemia as well. Have improved with re-timing hydralazine and after increasing oral fluids. CTA was done on  10/17/24 with no outflow graft ostruction. The outflow graft does make an acute angle as it approaches the aorta, discussed with Dr. Mulvihill,  "unlikely to be impacting the low flows at this time.  RHC with reasonable filling pressures as above. He has been asymptomatic.  - Decreased speed to 5000 on 10/27  - Losartan stopped previously for low BP  - SGLT2 inihibitor stopped for hypovolemia  - Increase PO intake as above, specifically right before bed, non free-water sources due to hypoNa     # History of VT/VF arrest 2023  # AFib s/p DCCV 9/2024 and again 9/30/2024   Successfully cardioverted in early September for AF. Since then EKG suggested AF on 9/21. Tele is only available from as early as 9/22. The patient was started on hep gtt 9/21, but unclear if was in AF before this. While the patient was on hep gtt until INR was therapeutic, it is not possible to assess rhythm prior to 9/21. Systemic AC was off on 9/18-9/20. S/p MELBA and DCCV on 9/30 with conversion to sinus rhythm.  10/23 device interrogation shows persistent episode of AF since 10/10 with rates   - Continue amiodarone 200 mg daily  - S/p digoxin load, continue digoxin 62.5 mcg daily   - Ongoing a fib- given ongoing low flows- deferring cardioversion for now given reasonable rate control.   - AC as above     # Hypovolemia hyponatremia, stable  improving  - encourage high sodium PO intake/gatorade and salty foods     # Visual changes, resolved.   # Generalized weakness, improved   # Headache, resolved  Stoke code called 9/29 for visual changes - right eye with decreased upper half of vision and bluish haze. Resolved after 30 minutes. Seen by opthalmology and neuro. Negative head CT and CTA head and neck. He has had 3 episodes which last about 5 minutes before resolving completely.      On 10/9 Stroke code activated due to concern of generalized weakness, numbness and headache. LKW reported as 0715 when he worked with therapies and shortly after started feeling generally weak. Then around 1500 he had onset of moderate \"tension\" type headache and tingling in the bilateral fingertips. Per RRT " URIAH patient now reports tingling has resolved and headache is improving. MAP 48, INR 2.2. Given absence of focal deficits and rapidly improving symptoms, opted to cancel stroke.  CT head negative, though did show chronic maxillary sinusitis.      Stroke code called 10/23 when seen in clinic with report of new RUE weakness. He was notably hypotensive at this time with MAP ~45 with new right pronator drift. Evaluated in ED, CT and CTA head and neck negative. RUE weakness resolved.   - monitor for changes- no current symptoms     Started cardiac rehab Jan 2025     # Cognitive changes  - Formal neuropsych testing this admission reveiling some cognitive changes  - 24/7 care during his first 30 days, will likely not need therafter, but continue to assess with each LVAD appointment (per Ho Méndez note from 11/8 Duane has passed his VAD education)  - Needs formal driving assessment with DMV or OT prior to being cleared to drive, wife aware and also discussed with patient at time of discharge  - Repeat neuropsych testing in 1 year  - Referred to behavioral neurology in place       Follow up:  - Dr. Cruz 2/3/25-reasonable to make virtual   - Echo/RHC with speed opti 3/14/25  - Dr. Cruz 3/17/25    Total time spent on patient visit, reviewing notes, imaging, labs, orders, and completing necessary documentation: 45 minutes.     Sirisha Arias DNP, NP-C  Advance Heart Failure  01/22/25

## 2025-01-22 ENCOUNTER — LAB (OUTPATIENT)
Dept: LAB | Facility: CLINIC | Age: 75
End: 2025-01-22
Payer: MEDICARE

## 2025-01-22 ENCOUNTER — OFFICE VISIT (OUTPATIENT)
Dept: CARDIOLOGY | Facility: CLINIC | Age: 75
End: 2025-01-22
Attending: INTERNAL MEDICINE
Payer: MEDICARE

## 2025-01-22 ENCOUNTER — ANTICOAGULATION THERAPY VISIT (OUTPATIENT)
Dept: ANTICOAGULATION | Facility: CLINIC | Age: 75
End: 2025-01-22

## 2025-01-22 VITALS — OXYGEN SATURATION: 95 % | BODY MASS INDEX: 23.79 KG/M2 | WEIGHT: 151.9 LBS | SYSTOLIC BLOOD PRESSURE: 70 MMHG

## 2025-01-22 DIAGNOSIS — Z79.01 ANTICOAGULATED ON WARFARIN: ICD-10-CM

## 2025-01-22 DIAGNOSIS — Z95.811 LVAD (LEFT VENTRICULAR ASSIST DEVICE) PRESENT (H): ICD-10-CM

## 2025-01-22 DIAGNOSIS — I95.9 TRANSIENT HYPOTENSION: ICD-10-CM

## 2025-01-22 DIAGNOSIS — I95.89 OTHER SPECIFIED HYPOTENSION: ICD-10-CM

## 2025-01-22 DIAGNOSIS — I50.42 CHRONIC COMBINED SYSTOLIC AND DIASTOLIC HEART FAILURE (H): ICD-10-CM

## 2025-01-22 DIAGNOSIS — I50.22 CHRONIC SYSTOLIC CONGESTIVE HEART FAILURE (H): ICD-10-CM

## 2025-01-22 DIAGNOSIS — I50.42 CHRONIC COMBINED SYSTOLIC AND DIASTOLIC CONGESTIVE HEART FAILURE (H): Primary | ICD-10-CM

## 2025-01-22 DIAGNOSIS — C61 PROSTATE CANCER (H): ICD-10-CM

## 2025-01-22 DIAGNOSIS — M26.79 TORI PRESENT ON RESIDUAL ALVEOLAR RIDGE OF MAXILLA: ICD-10-CM

## 2025-01-22 DIAGNOSIS — I50.22 CHRONIC SYSTOLIC CONGESTIVE HEART FAILURE (H): Primary | ICD-10-CM

## 2025-01-22 DIAGNOSIS — Z23 HIGH PRIORITY FOR 2019-NCOV VACCINE: ICD-10-CM

## 2025-01-22 LAB
ALBUMIN SERPL BCG-MCNC: 3.5 G/DL (ref 3.5–5.2)
ALP SERPL-CCNC: 118 U/L (ref 40–150)
ALT SERPL W P-5'-P-CCNC: 16 U/L (ref 0–70)
ANION GAP SERPL CALCULATED.3IONS-SCNC: 6 MMOL/L (ref 7–15)
AST SERPL W P-5'-P-CCNC: 22 U/L (ref 0–45)
BILIRUB SERPL-MCNC: 0.5 MG/DL
BUN SERPL-MCNC: 7.8 MG/DL (ref 8–23)
CALCIUM SERPL-MCNC: 8.9 MG/DL (ref 8.8–10.4)
CHLORIDE SERPL-SCNC: 98 MMOL/L (ref 98–107)
CREAT SERPL-MCNC: 0.81 MG/DL (ref 0.67–1.17)
EGFRCR SERPLBLD CKD-EPI 2021: >90 ML/MIN/1.73M2
ERYTHROCYTE [DISTWIDTH] IN BLOOD BY AUTOMATED COUNT: 15.4 % (ref 10–15)
GLUCOSE SERPL-MCNC: 101 MG/DL (ref 70–99)
HCO3 SERPL-SCNC: 30 MMOL/L (ref 22–29)
HCT VFR BLD AUTO: 32.1 % (ref 40–53)
HGB BLD-MCNC: 10.6 G/DL (ref 13.3–17.7)
INR PPP: 3.77 (ref 0.85–1.15)
LDH SERPL L TO P-CCNC: 209 U/L (ref 0–250)
MCH RBC QN AUTO: 29.5 PG (ref 26.5–33)
MCHC RBC AUTO-ENTMCNC: 33 G/DL (ref 31.5–36.5)
MCV RBC AUTO: 89 FL (ref 78–100)
NT-PROBNP SERPL-MCNC: 1782 PG/ML (ref 0–900)
PLATELET # BLD AUTO: 228 10E3/UL (ref 150–450)
POTASSIUM SERPL-SCNC: 4.7 MMOL/L (ref 3.4–5.3)
PROT SERPL-MCNC: 7.3 G/DL (ref 6.4–8.3)
PSA SERPL DL<=0.01 NG/ML-MCNC: 0.07 NG/ML (ref 0–6.5)
RBC # BLD AUTO: 3.59 10E6/UL (ref 4.4–5.9)
SODIUM SERPL-SCNC: 134 MMOL/L (ref 135–145)
WBC # BLD AUTO: 6.6 10E3/UL (ref 4–11)

## 2025-01-22 PROCEDURE — 93750 INTERROGATION VAD IN PERSON: CPT | Performed by: NURSE PRACTITIONER

## 2025-01-22 PROCEDURE — 84153 ASSAY OF PSA TOTAL: CPT | Performed by: PATHOLOGY

## 2025-01-22 PROCEDURE — 80053 COMPREHEN METABOLIC PANEL: CPT | Performed by: PATHOLOGY

## 2025-01-22 PROCEDURE — 83880 ASSAY OF NATRIURETIC PEPTIDE: CPT | Performed by: PATHOLOGY

## 2025-01-22 PROCEDURE — 36415 COLL VENOUS BLD VENIPUNCTURE: CPT | Performed by: PATHOLOGY

## 2025-01-22 PROCEDURE — G0463 HOSPITAL OUTPT CLINIC VISIT: HCPCS | Performed by: NURSE PRACTITIONER

## 2025-01-22 PROCEDURE — 83615 LACTATE (LD) (LDH) ENZYME: CPT | Performed by: PATHOLOGY

## 2025-01-22 PROCEDURE — 85027 COMPLETE CBC AUTOMATED: CPT | Performed by: PATHOLOGY

## 2025-01-22 PROCEDURE — 85610 PROTHROMBIN TIME: CPT | Performed by: PATHOLOGY

## 2025-01-22 RX ORDER — HYDRALAZINE HYDROCHLORIDE 25 MG/1
25 TABLET, FILM COATED ORAL 2 TIMES DAILY
Qty: 180 TABLET | Refills: 3 | Status: SHIPPED | OUTPATIENT
Start: 2025-01-22

## 2025-01-22 ASSESSMENT — PAIN SCALES - GENERAL: PAINLEVEL_OUTOF10: NO PAIN (0)

## 2025-01-22 NOTE — LETTER
1/22/2025      RE: Duane C Johnson  [id number] [address]       Dear Colleague,    Thank you for the opportunity to participate in the care of your patient, Duane C Johnson, at the [facility] HEART [facility] at [facility]. Please see a copy of my visit note below.      [facility] Cardiology   VAD Clinic      HPI:   Mr. Aguila is a 74 year old male with medical history pertinent for end-stage cardiomyopathy, history of coronary artery disease, previous STEMI, history of VT VF arrest, status post placement of ICD, history of A-fib a flutter with previous cardioversion, status post HeartMate 3 LVAD implantation 9/18/2024. He presents to clinic for routine follow up.     Duane was discharged to ARU on 10/5/24. There, he continued to have frequent low flow alarms with labile BPs and has required frequent adjustments to BP medications, initiation of florinef, as well as IVF and increased PO intake. Seen in clinic on 10/23/24 where he was presyncopal with MAPs in 40s. He was sent from clinic to ED for further evaluation. Admitted 10/23/24 for further workup of hypotension, hypovolemia, and low flows. Underwent stroke eval for neurological changes with negative CT. Symptoms improved following 2L IVF and improved MAPs. He was resumed on low doses of GDMT/afterload reduction. During this hospitalization continued to have several more runs of low flows. MAPs/meds optimized and prior to discharge he was without low flows/high PIs x 3 days. Controlled was also switched to low flow controller. Discharge was delayed due to needing additional LVAD education. Consulted neuropsych due to concerns for cognitive changes - Alzheimer's disease or possibly mixed pathology (mild cognitive impairment).      Following discharge, he presented to the emergency room on 11/10/2024 with witnessed syncopal episode, feeling lightheaded which led to a fall to the ground but he  did not strike his head or suffer any injury.  He reported that he did not lose consciousness although EMS reported that family witnessed a brief loss of consciousness episode.  The ER spoke to Dr. Nicolas, who suggested aggressive hydration as he is known to have hypovolemia and orthostasis and he had not had much to eat or drink that day before this episode. He did not have a low flow alarm then.    Duane has since followed closely with Dr. Cruz. Last seen in clinic 1/6/25. At that time reported a few episodes of lightheadedness, but denied syncope. Drinking 100-120 oz (~ 3L) daily. His low flow alarm limit is set to 2L/min.      He is mostly not using his walker, and reports he is using it if walking further than just going from couch to bathroom (about 20 feet) when he holds on to walls/doorways. His wife is with him 24x7, she works from home.    Today, Duane reports feeling quite well. He is attending cardiac rehab 3 days/week. Stamina and strength are improving. He reports that he can only do about 2 minutes on the treadmill before fatiguing, but able to go longer on the push/pull exercise bike. Home weights stable around 145 lb. Rare episodes of lightheadedness, but only sometimes when he stands too quickly. Has not had any syncopal or near syncopal episodes since November. Denies changes in speech, fever, chills, chest pain, SOB, DIOP, PND, and LE edema.   No fevers or chills. No nausea, vomiting, diarrhea.   Denies any concerns at LVAD drive line site. Driveline redness or pain.  No driveline drainage. No LVAD alarms. Last low flow alarm 12/8.   No blood in the urine or blood in the stool or prolonged nosebleeds.  No headaches or vision changes. No stroke symptoms.    Cardiac Medications:   - Amiodarone 200 mg daily   - Amlodipine 5 mg daily   - Atorvastatin 80 mg daily   - Digoxin 62.5 mcg daily   - Hydralazine 25 mg TID  - Warfarin     PAST MEDICAL HISTORY:  Past Medical History:   Diagnosis Date      Benign essential hypertension      CAD (coronary artery disease)      Chronic systolic heart failure (H)      Hypertension      ST elevation myocardial infarction (STEMI), unspecified artery (H)      Ventricular fibrillation (H)        FAMILY HISTORY:  Family History   Problem Relation Age of Onset     Other Cancer Mother      Obesity Mother      GI problems Father      Uterine Cancer Sister        SOCIAL HISTORY:  Social History     Socioeconomic History     Marital status:    Tobacco Use     Smoking status: Former     Current packs/day: 0.00     Average packs/day: 0.3 packs/day for 30.0 years (7.5 ttl pk-yrs)     Types: Cigarettes     Start date: 10/26/1993     Quit date: 10/26/2023     Years since quittin.2     Passive exposure: Past     Smokeless tobacco: Never     Tobacco comments:     Quit 10/26/2023   Vaping Use     Vaping status: Never Used   Substance and Sexual Activity     Alcohol use: Yes     Comment: 1-2 beers per day     Drug use: No     Sexual activity: Yes     Partners: Female     Birth control/protection: None   Other Topics Concern     Parent/sibling w/ CABG, MI or angioplasty before 65F 55M? No     Social Drivers of Health     Financial Resource Strain: Low Risk  (10/23/2024)    Financial Resource Strain      Within the past 12 months, have you or your family members you live with been unable to get utilities (heat, electricity) when it was really needed?: No   Food Insecurity: Low Risk  (10/23/2024)    Food Insecurity      Within the past 12 months, did you worry that your food would run out before you got money to buy more?: No      Within the past 12 months, did the food you bought just not last and you didn t have money to get more?: No   Transportation Needs: Low Risk  (10/23/2024)    Transportation Needs      Within the past 12 months, has lack of transportation kept you from medical appointments, getting your medicines, non-medical meetings or appointments, work, or from getting  things that you need?: No   Physical Activity: Inactive (11/16/2023)    Exercise Vital Sign      Days of Exercise per Week: 0 days      Minutes of Exercise per Session: 0 min   Social Connections: Unknown (11/16/2023)    Social Connection and Isolation Panel [NHANES]      Marital Status:    Interpersonal Safety: Low Risk  (10/23/2024)    Interpersonal Safety      Do you feel physically and emotionally safe where you currently live?: Yes      Within the past 12 months, have you been hit, slapped, kicked or otherwise physically hurt by someone?: No      Within the past 12 months, have you been humiliated or emotionally abused in other ways by your partner or ex-partner?: No   Recent Concern: Interpersonal Safety - High Risk (10/17/2024)    Interpersonal Safety      Do you feel physically and emotionally safe where you currently live?: No      Within the past 12 months, have you been hit, slapped, kicked or otherwise physically hurt by someone?: No      Within the past 12 months, have you been humiliated or emotionally abused in other ways by your partner or ex-partner?: No   Housing Stability: Low Risk  (10/23/2024)    Housing Stability      Do you have housing? : Yes      Are you worried about losing your housing?: No   Recent Concern: Housing Stability - High Risk (9/1/2024)    Housing Stability      Do you have housing? : No      Are you worried about losing your housing?: No       CURRENT MEDICATIONS:  Current Outpatient Medications   Medication Sig Dispense Refill     acetaminophen (TYLENOL) 325 MG tablet Take 2 tablets (650 mg) by mouth every 4 hours as needed for other (For optimal non-opioid multimodal pain management to improve pain control.).       amiodarone (PACERONE) 200 MG tablet Take 1 tablet (200 mg) by mouth daily. 90 tablet 3     amLODIPine (NORVASC) 10 MG tablet Take 0.5 tablets (5 mg) by mouth daily. 45 tablet 3     atorvastatin (LIPITOR) 80 MG tablet Take 1 tablet (80 mg) by mouth every  evening. 90 tablet 3     digoxin (LANOXIN) 62.5 MCG tablet Take 1 tablet (62.5 mcg) by mouth daily. 30 tablet 11     escitalopram (LEXAPRO) 10 MG tablet Take 1 tablet (10 mg) by mouth or Feeding Tube daily. 90 tablet 3     gabapentin (NEURONTIN) 100 MG capsule Take 1 capsule (100 mg) by mouth or Feeding Tube at bedtime. 90 capsule 3     hydrALAZINE (APRESOLINE) 25 MG tablet Take 1 tablet (25 mg) by mouth 3 times daily. 180 tablet 3     magnesium hydroxide (MILK OF MAGNESIA) 400 MG/5ML suspension Take 30 mLs by mouth daily as needed for constipation (Use if polyethylene glycol (Miralax) is not effective after 24 hours.). 354 mL 0     magnesium oxide (MAG-OX) 400 MG tablet Take 1 tablet (400 mg) by mouth daily. 90 tablet 3     melatonin 10 MG TABS tablet Take 1 tablet (10 mg) by mouth every evening.       multivitamin w/minerals (THERA-VIT-M) tablet Take 1 tablet by mouth daily. 90 tablet 3     warfarin ANTICOAGULANT (COUMADIN) 2.5 MG tablet Take 1 tablet (2.5 mg) by mouth every evening. 90 tablet 3     No current facility-administered medications for this visit.       ROS:   CONSTITUTIONAL: Denies fever, chills, fatigue, or weight fluctuations.   HEENT: Denies headache, vision changes, and changes in speech.   CV: Refer to HPI.   PULMONARY:Refer to HPI.   GI:Denies nausea, vomiting, diarrhea, and abdominal pain. Bowel movements are regular.   :Denies urinary alterations, dysuria, urinary frequency, hematuria, and abnormal drainage.   EXT:Denies lower extremity edema.   SKIN:Denies abnormal rashes or lesions.   MUSCULOSKELETAL:Denies upper or lower extremity weakness and pain.   NEUROLOGIC:Denies lightheadedness, dizziness, seizures, or upper or lower extremity paresthesia.     EXAM:  BP (!) 70/0 (BP Location: Left arm, Patient Position: Chair, Cuff Size: Adult Small)   Wt 68.9 kg (151 lb 14.4 oz)   SpO2 95%   BMI 23.79 kg/m     GENERAL: Appears comfortable, in no distress.  HEENT: Eye symmetrical and without  discharge or icterus bilaterally. Mucous membranes moist and without lesions.  NECK: Supple, JVD <6 cm.   CV: + LVAD hum  RESPIRATORY: Respirations regular, even, and unlabored. Lungs CTA throughout.   GI: Soft and non distended with normoactive bowel sounds present in all quadrants. No tenderness, rebound, guarding. No organomegaly.   EXTREMITIES: no peripheral edema. Non-pulsatile.   NEUROLOGIC: Alert and orientated x 3.  No focal deficits.   MUSCULOSKELETAL: No joint swelling or tenderness.   SKIN: No jaundice. No rashes or lesions. Driveline dressing c/d/i.    Labs - as reviewed in clinic with patient today:  CBC RESULTS:  Lab Results   Component Value Date    WBC 7.5 01/06/2025    WBC 12.2 (H) 05/26/2021    RBC 3.88 (L) 01/06/2025    RBC 3.60 (L) 05/26/2021    HGB 11.7 (L) 01/06/2025    HGB 12.0 (L) 05/26/2021    HCT 34.3 (L) 01/06/2025    HCT 34.8 (L) 05/26/2021    MCV 88 01/06/2025    MCV 97 05/26/2021    MCH 30.2 01/06/2025    MCH 33.3 (H) 05/26/2021    MCHC 34.1 01/06/2025    MCHC 34.5 05/26/2021    RDW 15.2 (H) 01/06/2025    RDW 13.2 05/26/2021     01/06/2025     05/26/2021       CMP RESULTS:  Lab Results   Component Value Date     01/06/2025     10/27/2023     05/26/2021    POTASSIUM 3.8 01/06/2025    POTASSIUM 3.8 10/23/2024    POTASSIUM 2.5 (LL) 09/23/2024    POTASSIUM 4.5 05/26/2021    CHLORIDE 98 01/06/2025    CHLORIDE 103 10/27/2023    CHLORIDE 105 05/26/2021    CO2 26 01/06/2025    CO2 27 10/27/2023    CO2 29 05/26/2021    ANIONGAP 11 01/06/2025    ANIONGAP 7 05/26/2021     (H) 01/06/2025     (H) 11/09/2024     (H) 05/26/2021    BUN 9.1 01/06/2025    BUN 41 (H) 05/26/2021    CR 0.87 01/06/2025    CR 0.80 05/26/2021    GFRESTIMATED >90 01/06/2025    GFRESTIMATED >60 10/23/2024    GFRESTIMATED 90 05/26/2021    GFRESTBLACK >90 05/26/2021    PRANAV 8.9 01/06/2025    PRANAV 8.3 (L) 05/26/2021    BILITOTAL 0.5 01/06/2025    BILITOTAL 0.5 03/20/2018    ALBUMIN  3.7 01/06/2025    ALBUMIN 3.7 03/20/2018    ALKPHOS 123 01/06/2025    ALKPHOS 82 03/20/2018    ALT 24 01/06/2025    ALT 22 03/20/2018    AST 25 01/06/2025    AST 20 03/20/2018        INR RESULTS:  Lab Results   Component Value Date    INR 2.73 (H) 01/06/2025    INR 1.15 (H) 05/26/2021       Lab Results   Component Value Date    MAG 1.8 12/20/2024     Lab Results   Component Value Date    NTBNPI 22,172 (H) 09/04/2024     Lab Results   Component Value Date    NTBNP 2,299 (H) 01/06/2025         Cardiac Diagnostics    10/24/24 RHC     Right sided filling pressures are normal.     Left sided filling pressures are normal.     Normal PA pressures.     Normal cardiac output level.     Hemodynamic data has been modified in Epic per physician review.  RIGHT HEART CATHETERIZATION:    === Baseline ====    BSA 1.75 m2  /89/101 mmHg  RA --/--/6 mmHg  RV 30/3mmHg  PA 30/14/20 mmHg  PCWP --/--/9 mmHg  TD CO/CI 4.1/2.34   Goldy CO/CI 4.54/2.59   PVR 2.68 (TD)  PA sat 65%     10/23/24 Echo  nterpretation Summary  LVAD cannula was seen in the expected anatomic position in the LV apex.  HM3 at 5100RPM.  LVIDd 47mm.  Septum normal.  Aortic valve open intermittently. No AI.  Normal flow velocities.  Global right ventricular function is mildly to moderately reduced.  Small circumferential pericardial effusion is present without any hemodynamic  significance.      Assessment and Plan:   Mr. Aguila is a 74 year old male with medical history pertinent for end-stage cardiomyopathy, history of coronary artery disease, previous STEMI, history of VT VF arrest, status post placement of ICD, history of A-fib a flutter with previous cardioversion, status post HeartMate 3 LVAD implantation 9/18/2024. He presents to clinic for routine follow up.     Appearing and feeling well. Duane looks the best I've seen in a long time. He has been diligent with his fluid and salt intake and has been successful in avoiding any recent low flow alarms. MAPs  are at goal. Review of today's labs stable-lytes and renal function wnl, BNP trending down, 1,782 today. We will defer any medication changes today.     Has oral yuval and will need to be seen by oral surgery for assistance in fitting for dentures. Referral placed.     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 NYHA Class III.  10/23/24 RHC: RA 6, PA 40/14/20, PCW 9, Goldy CO/CI 4.54/2.59 at 5100 rpm     Fluid status: euvolemic, He is drinking fluid intake >120 ounce per day, mainly Gatorade/water, avoid soda  ACEi/ARB/ARNi: off losartan due to hypotension  Vasodilator: hydralazine 25 mg q8h.  amlodipine 5 mg daily --> may need further reduction in the future  BB: deferred due to recent VAD. Sometimes misses mid day dose hydralazine  Aldosterone antagonist: STOPPED blane due to hypoNa  SGLT2i: STOPPED empagliflozin 10 mg due to propensity for hypovolemia  SCD prophylaxis: ICD  MAP: goal 70-90, encouraging POs ulitize hold parameters. MAP lower today but will continue current regiment based on improved clinical status  LDH trends: stable   Anticoagulation: warfarin dosing per pharmacy, INR goal 2-3  Antiplatelet: ASA not indicated in LVAD population, > 6 months s/p STEMI    Other:   - No fluid restriction, encourage drinks with Na/electrolytes, increase fluid intake to greater than 120 ounce per day, eat frequent salty snacks  - Patient has a low flow controller, will not alarm until flow <2. Hematocrit is set to match his true hematocrit  - Dr. Smith has reviewed inflow and outflow positioning, nothing to do      VAD interrogation 01/22/25: VAD interrogation reviewed with VAD coordinator. Agree with findings. occasional PI events, no power spikes, speed drops, or other findings suspicious of pump malfunction noted.      # Low flow alarms  #  Elevated PIs  # Labile MAPs   # Suspected orthostatic hypotension, resolved  Low flows seems releated to hypertension and some hypovolemia as well. Have improved with re-timing hydralazine and after increasing oral fluids. CTA was done on  10/17/24 with no outflow graft ostruction. The outflow graft does make an acute angle as it approaches the aorta, discussed with Dr. Mulvihill, unlikely to be impacting the low flows at this time.  RHC with reasonable filling pressures as above. He has been asymptomatic.  - Decreased speed to 5000 on 10/27  - Losartan stopped previously for low BP  - SGLT2 inihibitor stopped for hypovolemia  - Increase PO intake as above, specifically right before bed, non free-water sources due to hypoNa     # History of VT/VF arrest 2023  # AFib s/p DCCV 9/2024 and again 9/30/2024   Successfully cardioverted in early September for AF. Since then EKG suggested AF on 9/21. Tele is only available from as early as 9/22. The patient was started on hep gtt 9/21, but unclear if was in AF before this. While the patient was on hep gtt until INR was therapeutic, it is not possible to assess rhythm prior to 9/21. Systemic AC was off on 9/18-9/20. S/p MELBA and DCCV on 9/30 with conversion to sinus rhythm.  10/23 device interrogation shows persistent episode of AF since 10/10 with rates   - Continue amiodarone 200 mg daily  - S/p digoxin load, continue digoxin 62.5 mcg daily   - Ongoing a fib- given ongoing low flows- deferring cardioversion for now given reasonable rate control.   - AC as above     # Hypovolemia hyponatremia, stable  improving  - encourage high sodium PO intake/gatorade and salty foods     # Visual changes, resolved.   # Generalized weakness, improved   # Headache, resolved  Stoke code called 9/29 for visual changes - right eye with decreased upper half of vision and bluish haze. Resolved after 30 minutes. Seen by opthalmology and neuro. Negative head CT and CTA head and neck. He has  "had 3 episodes which last about 5 minutes before resolving completely.      On 10/9 Stroke code activated due to concern of generalized weakness, numbness and headache. LKW reported as 0715 when he worked with therapies and shortly after started feeling generally weak. Then around 1500 he had onset of moderate \"tension\" type headache and tingling in the bilateral fingertips. Per RRT URIAH patient now reports tingling has resolved and headache is improving. MAP 48, INR 2.2. Given absence of focal deficits and rapidly improving symptoms, opted to cancel stroke.  CT head negative, though did show chronic maxillary sinusitis.      Stroke code called 10/23 when seen in clinic with report of new RUE weakness. He was notably hypotensive at this time with MAP ~45 with new right pronator drift. Evaluated in ED, CT and CTA head and neck negative. RUE weakness resolved.   - monitor for changes- no current symptoms     Started cardiac rehab Jan 2025     # Cognitive changes  - Formal neuropsych testing this admission reveiling some cognitive changes  - 24/7 care during his first 30 days, will likely not need therafter, but continue to assess with each LVAD appointment (per Ho Méndez note from 11/8 Duane has passed his VAD education)  - Needs formal driving assessment with DMV or OT prior to being cleared to drive, wife aware and also discussed with patient at time of discharge  - Repeat neuropsych testing in 1 year  - Referred to behavioral neurology in place       Follow up:  - Dr. Cruz 2/3/25-reasonable to make virtual   - Echo/RHC with speed opti 3/14/25  - Dr. Cruz 3/17/25    Total time spent on patient visit, reviewing notes, imaging, labs, orders, and completing necessary documentation: 45 minutes.     Sirisha Arias DNP, NP-C  Advance Heart Failure  01/22/25      Please do not hesitate to contact me if you have any questions/concerns.     Sincerely,     Sirisha Arias, NADEGE CNP  "

## 2025-01-22 NOTE — PATIENT INSTRUCTIONS
" Ventricular Assist Device Clinic  Take your medications every day, as directed!  Medication changes made today:  No changes  OK to have the COVID vaccine today Instructions:  Referral to oral surgery at the [facility] - [phone] (for dentures and to look at the sores in your mouth) Monitor your heart failure, Page the VAD Coordinator on call:  If you gain more than 3 lb in a day or 5 in a week  If you feel worsening shortness of breath, palpitations, or swelling.   If you have VAD alarms or change in parameters  If you feel dizzy or lightheaded     Keep your VAD appointments!    Your next appointment is: 2/3   2/3 appt, OK to switch to virtual       Please see the clinic schedulers after your appointment to schedule follow-up.    If you do not have an appointment scheduled, you need to call the VAD  at [phone]. To Page the VAD Coordinator on call, dial [phone] option #[phone] and ask to speak to the VAD coordinator on call. Try to maintain a heart healthy lifestyle!  Eat a heart healthy diet, low in saturated fats  Stay active! Aim to move at least 150 minutes every week.    Use [employer] allows you to communicate directly with your heart team through secure messaging.  UGE can be accessed any time on your phone, computer, or tablet.  If you need assistance, we'd be happy to help!      Equipment Reminders:   Charge your back-up controller at least every 6 months. To charge, connect it to either batteries or wall power. The screen will read \"Charging\". Keep connected until the screen reads \"charging complete\". Disconnect power once the controller battery is charged. Also do a self-test when the controller is connected to power.  Replace the AA batteries in your mobile power unit every 6 months.  Check your battery charger for when it is due for maintenance. It requires inspection in clinic once per year. There will be a sticker stating the " month and year maintenance is due. When you bring your battery charger to clinic, tell one of the schedulers you have LVAD equipment that needs maintenance. They will call [facility]. You can leave your  under the LVAD sign by the  at the far end of clinic. You must drop it off before 2pm.

## 2025-01-22 NOTE — PROGRESS NOTES
ANTICOAGULATION MANAGEMENT     Duane C Johnson 74 year old male is on warfarin with supratherapeutic INR result. (Goal INR 2.0-3.0)    Recent labs: (last 7 days)     01/22/25  1428   INR 3.77*       ASSESSMENT     Source(s): Chart Review and Patient/Caregiver Call     Warfarin doses taken: Warfarin taken as instructed  Diet: No new diet changes identified  Medication/supplement changes: None noted  New illness, injury, or hospitalization: No  Signs or symptoms of bleeding or clotting: No  Previous result: Therapeutic last 2(+) visits  Additional findings: None       PLAN     Recommended plan for no diet, medication or health factor changes affecting INR     Dosing Instructions: decrease your warfarin dose (9% change) with next INR in 1 week       Summary  As of 1/22/2025      Full warfarin instructions:  2.5 mg every Sun, Tue, Fri; 1.25 mg all other days   Next INR check:  1/29/2025               Telephone call with Melissa who verbalizes understanding and agrees to plan and who agrees to plan and repeated back plan correctly    Patient offered & declined to schedule next visit    Education provided: Taking warfarin: Importance of taking warfarin as instructed  Goal range and lab monitoring: goal range and significance of current result, Importance of therapeutic range, and Importance of following up at instructed interval    Plan made per [facility] anticoagulation protocol and per LVAD protocol    Namrata Walker RN  1/22/2025  Anticoagulation Clinic  [employer]box for routing messages: susannah ANTICOAG LVAD  [facility] patient phone line: [phone]        _______________________________________________________________________     Anticoagulation Episode Summary       Current INR goal:  2.0-3.0   TTR:  83.1% (2.3 mo)   Target end date:  Indefinite   [facility]    Indications    Chronic systolic congestive heart failure (H) [I50.22]  Anticoagulated on warfarin [Z79.01]  LVAD (left ventricular assist device)  present (H) [Z95.811]  Other specified hypotension [I95.89]  Chronic combined systolic and diastolic heart failure (H) [I50.42]             Comments:  Follow VAD Anticoag protocol:Yes: HeartMate 3  Bridging: Enoxaparin  Date VAD placed: 9/18/24             Anticoagulation Care Providers       Provider Role Specialty Phone number    Ham Cruz MD Referring Advanced Heart Failure and Transplant Cardiology [phone]    Sravanthi Asencio MD Referring Cardiovascular Disease [phone]

## 2025-01-22 NOTE — NURSING NOTE
Chief Complaint   Patient presents with    Follow Up     1/22/2025 visit with Sirisha Arias APRN CNP for RETURN VAD - Biweekly post VAD implant 9/18/24 confirmed appointment with pt - AGUEDA 1/21/25         Vitals were taken and medications reconciled.    Blood pressure (!) 70/0, weight 68.9 kg (151 lb 14.4 oz), SpO2 95%.    William Gonzalez, EMT  2:58 PM

## 2025-01-22 NOTE — NURSING NOTE
01/22/25 1500   [facility] VAD Information   Implant LVAD   LVAD Pump HeartMate 3   Heartmate 3 LEFT VS   Flow (Lpm) 3.2 Lpm   Pulse Index (PI) 5.3 PI   Speed (rpm) 5000 rpm   Power (jackson) 3.1 jackson   Current Hct setting 32  (Updated)   Primary Controller   Serial number [id number]   Low flow alarm setting 2.5   EBB: Patient use 11   Replace in 29 Months   Backup Controller   Serial number [id number]   EBB: Patient use 8   Replace EBB in 29 Months   VAD Interrogation   Alarms reported by patient N   Unexpected alarms noted upon interrogation None   PI events Frequent;w/ associated speed drops   Damage to equipment is noted N   Action taken Reviewed proper equipment care and maintenance   Driveline Exit Site   Dressing change done N   Driveline properly secured Yes   DLES assessment c/d/i per pt report   Dressing used Weekly kit   Frequency patient changes dressing Weekly       Education Complete: Yes   Charge the BACKUP controller s backup battery every 6 months  Perform a self test on BACKUP every 6 months  Change the MPU s batteries every 6 months:Yes  Have equipment serviced yearly (if applicable):Yes    Reviewed laminated flashcard to be kept in back-up bag. Reviewed equipment names and functions.

## 2025-01-24 ENCOUNTER — HOSPITAL ENCOUNTER (OUTPATIENT)
Dept: CARDIAC REHAB | Facility: CLINIC | Age: 75
Discharge: HOME | End: 2025-01-24
Attending: FAMILY MEDICINE
Payer: MEDICARE

## 2025-01-24 PROCEDURE — 93798 PHYS/QHP OP CAR RHAB W/ECG: CPT

## 2025-01-27 ENCOUNTER — HOSPITAL ENCOUNTER (OUTPATIENT)
Dept: CARDIAC REHAB | Facility: CLINIC | Age: 75
Discharge: HOME | End: 2025-01-27
Attending: FAMILY MEDICINE
Payer: MEDICARE

## 2025-01-27 PROCEDURE — 93798 PHYS/QHP OP CAR RHAB W/ECG: CPT

## 2025-01-28 DIAGNOSIS — Z95.811 LVAD (LEFT VENTRICULAR ASSIST DEVICE) PRESENT (H): ICD-10-CM

## 2025-01-28 DIAGNOSIS — Z79.01 ANTICOAGULATED ON WARFARIN: ICD-10-CM

## 2025-01-28 DIAGNOSIS — I50.22 CHRONIC SYSTOLIC CONGESTIVE HEART FAILURE (H): Primary | ICD-10-CM

## 2025-01-29 ENCOUNTER — HOSPITAL ENCOUNTER (OUTPATIENT)
Dept: CARDIAC REHAB | Facility: CLINIC | Age: 75
Discharge: HOME | End: 2025-01-29
Attending: FAMILY MEDICINE
Payer: MEDICARE

## 2025-01-29 PROCEDURE — 93798 PHYS/QHP OP CAR RHAB W/ECG: CPT

## 2025-01-31 ENCOUNTER — HOSPITAL ENCOUNTER (OUTPATIENT)
Dept: CARDIAC REHAB | Facility: CLINIC | Age: 75
Discharge: HOME | End: 2025-01-31
Attending: FAMILY MEDICINE
Payer: MEDICARE

## 2025-01-31 PROCEDURE — 93798 PHYS/QHP OP CAR RHAB W/ECG: CPT

## 2025-02-01 ENCOUNTER — CARE COORDINATION (OUTPATIENT)
Dept: CARDIOLOGY | Facility: CLINIC | Age: 75
End: 2025-02-01
Payer: MEDICARE

## 2025-02-01 NOTE — PROGRESS NOTES
Duane called in with some slight bleeding in his stool. He thinks it might be a hemorrhoid as he has had them in the past. He is not dizzy and is feeling fine. His HGB has dropped 1.5 grams in the past 3 weeks from 11.7 to 10.2. His recent INR was within his goal range of 2-3 at 2.55.    Duane reports drinking 6 20-ounce bottles of Gatorade per day spaced out throughout the day to keep from getting dizzy and/or having LOW FLOW alarms.    His VAD numbers are baseline except for the elevated PI:    Heartmate 3 LEFT VS  Flow (Lpm): 3.1 Lpm  Pulse Index (PI): 6 PI (higher than usual per Duane)  Speed (rpm): 5000 rpm  Power (jackson): 3.2 jackson      I told Duane that his HGB is trending down and there is a chance that he is bleeding from his GI tract. I told him if the bleeding does not stop or if it gets worse, he should go to the ED. I also asked him to tell Dr Cruz about this symptom when he sees her virtually on 2/3.    Duane agreed to go to the ED if the bleeding worsens.

## 2025-02-03 ENCOUNTER — VIRTUAL VISIT (OUTPATIENT)
Dept: CARDIOLOGY | Facility: CLINIC | Age: 75
End: 2025-02-03
Attending: INTERNAL MEDICINE
Payer: MEDICARE

## 2025-02-03 VITALS — HEIGHT: 66 IN | WEIGHT: 147 LBS | BODY MASS INDEX: 23.63 KG/M2

## 2025-02-03 DIAGNOSIS — Z79.01 ANTICOAGULATED ON WARFARIN: ICD-10-CM

## 2025-02-03 DIAGNOSIS — I50.22 CHRONIC SYSTOLIC CONGESTIVE HEART FAILURE (H): ICD-10-CM

## 2025-02-03 DIAGNOSIS — K92.1 BLOOD IN STOOL: ICD-10-CM

## 2025-02-03 DIAGNOSIS — R42 DIZZINESS: Primary | ICD-10-CM

## 2025-02-03 DIAGNOSIS — Z95.811 LVAD (LEFT VENTRICULAR ASSIST DEVICE) PRESENT (H): ICD-10-CM

## 2025-02-03 ASSESSMENT — PAIN SCALES - GENERAL: PAINLEVEL_OUTOF10: NO PAIN (0)

## 2025-02-03 NOTE — NURSING NOTE
Current patient location: [address] [place] [id number]    Is the patient currently in the state of [place]? YES    Visit mode: VIDEO    If the visit is dropped, the patient can be reconnected by:VIDEO VISIT: Text to cell phone:   Telephone Information:   Mobile [phone]       Will anyone else be joining the visit? Pt's wife is with the pt and will be joining the video visit per pt  (If patient encounters technical issues they should call [phone])    Are changes needed to the allergy or medication list? Pt stated no med changes    Are refills needed on medications prescribed by this physician? Discuss with provider    Rooming Documentation:  Not applicable    Reason for visit: RECHECK    LVAD numbers Per pt  Speed 5,000  Flow 3.3 Pi 4.8  Power 3.2  Pressure 90      Linh MORANF

## 2025-02-03 NOTE — LETTER
"2/3/2025      RE: Duane C Johnson  [id number] [facility] [address]       Dear Colleague,    Thank you for the opportunity to participate in the care of your patient, Duane C Johnson, at the [facility] at [facility]. Please see a copy of my visit note below.    Virtual Visit Details    Type of service:  Video Visit   Video Start Time: {video visit start/end time for provider to select:643444}  Video End Time:{video visit start/end time for provider to select:298817}    Originating Location (pt. Location): {video visit patient location:337755::\"Home\"}  {PROVIDER LOCATION On-site should be selected for visits conducted from your clinic location or adjoining [facility] hospital, academic office, or other nearby [facility] building. Off-site should be selected for all other provider locations, including home:[id number]}  Distant Location (provider location):  {virtual location provider:724774}  Platform used for Video Visit: {Virtual Visit Platforms:590905::\"[employer]\"}    [facility]  Advanced Heart Failure Clinic    Patient Name: Duane C Johnson   : 1950   Date of Visit: 2025    Primary Care Physician: Jose Coles MD     Referring Physician: No ref. provider found   Reason for Visit: LVAD    Virtual Visit Details     Type of service:  Video Visit   Video Start Time: 2:50 pm  Video End Time:3:25 pm    Originating Location (pt. Location): Home  {PROVIDER LOCATION On-site should be selected for visits conducted from your clinic location or adjoining [facility] hospital, academic office, or other nearby [facility] building. Off-site should be selected for all other provider locations, including home:[id number]}  Distant Location (provider location):  On-site  Platform used for Video Visit: PounceLive       Dear Dr. Sanket MD     I had the pleasure of seeing Duane C Johnson today in the [facility] Advanced Heart Failure Clinic. As you know " Duane C Johnson is a pleasant 74 year old year old male, who presents today for further evaluation of LVAD.    Duane has a history of end-stage cardiomyopathy, history of coronary artery disease, previous STEMI, history of VT VF arrest, status post placement of ICD, history of A-fib a flutter with previous cardioversion, status post HeartMate 3 LVAD implantation 9/18/2024.    Duane was discharged to ARU on 10/5/24. There, he continued to have frequent low flow alarms with labile BPs and has required frequent adjustments to BP medications, initiation of florinef, as well as IVF and increased PO intake. Seen in clinic on 10/23 where he was presyncopal with MAPs in 40s. He was sent from clinic to ED for further evaluation. Admitted 10/23/24 for further workup of hypotension, hypovolemia, and low flows. Underwent stroke eval for neurological changes with negative CT. Symptoms improved following 2L IVF and improved MAPs. He was resumed on low doses of GDMT/afterload reduction. During this hospitalization continued to have several more runs of low flows. MAPs/meds optimized and prior to discharge he was without low flows/high PIs x 3 days. Controlled was also switched to low flow controlled. Discharge was delayed due to needing additional LVAD education. Consulted neuropsych due to concerns for cognitive changes - Alzheimer's disease or possibly mixed pathology (mild cognitive impairment).     Following discharge, he presented to the emergency room on 11/10/2024 with witnessed syncopal episode, feeling lightheaded which led to a fall to the ground but he did not strike his head or suffer any injury.  He reported that he did not lose consciousness although EMS reported that family witnessed a brief loss of consciousness episode.  The ER spoke to Dr. Nicolas, who suggested aggressive hydration as he is known to have hypovolemia and orthostasis and he had not had much to eat or drink that day before this episode. He did not  have a low flow alarm then.    We have been seeing him in clinic frequently, and have mostly cut back on some of his anti hypertensives and working on a plan of aggressive hydration. He saw me last on 1/6/25, when he was doing well    ____________________    Today he reports blood in stool, fatigue, generalized weakness, dizziness/shakiness today. He sat down on the floor next to the bed as he went to lay down.  Last visit his lightheadedness was better, but now is worse for 4-5 days. He had labs on Thursday when Hb was stable but symptoms have worsened.  He has red blood in stool, and thinks this is a very small amount. He has normal bowel movements.    He is drinking a lot (gatorade), and he and his wife confirm that he is drinking at least 100 - 120 oz of fluid per day water/ gatorade, milk, one 14 oz of Core 42 protein drink, has cut down on mountain dew to less than one per day. His low flow alarm limit is set to 2L/min, last alarm was on 12/8/24, very brief.    He is mostly not using his walker, and reports he is using it if walking further than just going from couch to bathroom (about 20 feet) when he holds on to walls/doorways. His wife is with him 24x7, she works from home.    He has started cardiac rehab Jan 2025, going MWF    Home BPs - Doppler at home MAPs in 86-90 mm Hg range  Home weights - not checking    ROS:  A complete 10-point ROS was negative except as above.    PAST MEDICAL HISTORY:  Past Medical History:   Diagnosis Date     Benign essential hypertension      CAD (coronary artery disease)      Chronic systolic heart failure (H)      Hypertension      ST elevation myocardial infarction (STEMI), unspecified artery (H)      Ventricular fibrillation (H)        PAST SURGICAL HISTORY:  Past Surgical History:   Procedure Laterality Date     ANESTHESIA CARDIOVERSION N/A 9/5/2024    Procedure: Anesthesia cardioversion;  Surgeon: GENERIC ANESTHESIA PROVIDER;  Location: [facility]     ANESTHESIA CARDIOVERSION  N/A 9/30/2024    Procedure: Anesthesia cardioversion;  Surgeon: GENERIC ANESTHESIA PROVIDER;  Location: [facility]     COLONOSCOPY N/A 3/23/2023    Procedure: COLONOSCOPY, FLEXIBLE, WITH LESION REMOVAL USING SNARE;  Surgeon: Jose Faustin MD;  Location: [facility]     CV CENTRAL VENOUS CATHETER PLACEMENT N/A 10/26/2023    Procedure: Central Venous Catheter Placement;  Surgeon: Rob Lyles MD;  Location:  HEART CARDIAC CATH LAB     CV CORONARY ANGIOGRAM N/A 10/27/2023    Procedure: Coronary Angiogram;  Surgeon: Rob Lyles MD;  Location:  HEART CARDIAC CATH LAB     CV CORONARY ANGIOGRAM N/A 10/26/2023    Procedure: Coronary Angiogram;  Surgeon: Rob Lyles MD;  Location:  HEART CARDIAC CATH LAB     CV LEFT HEART CATH N/A 10/26/2023    Procedure: Left Heart Catheterization;  Surgeon: Rob Lyles MD;  Location:  HEART CARDIAC CATH LAB     CV PCI N/A 10/27/2023    Procedure: Percutaneous Coronary Intervention;  Surgeon: Rob Lyles MD;  Location:  HEART CARDIAC CATH LAB     CV PCI STENT DRUG ELUTING N/A 10/26/2023    Procedure: Percutaneous Coronary Intervention Stent;  Surgeon: Rob Lyles MD;  Location:  HEART CARDIAC CATH LAB     CV RIGHT HEART CATH MEASUREMENTS RECORDED N/A 5/6/2024    Procedure: Heart Cath Right Heart Cath;  Surgeon: Rob Lyles MD;  Location:  HEART CARDIAC CATH LAB     CV RIGHT HEART CATH MEASUREMENTS RECORDED N/A 9/3/2024    Procedure: Right Heart Catheterization;  Surgeon: Aaron Mesa MD;  Location: [facility] CARDIAC CATH LAB     CV RIGHT HEART CATH MEASUREMENTS RECORDED N/A 10/24/2024    Procedure: Right Heart Catheterization;  Surgeon: Haile Braden MD;  Location: [facility] CARDIAC CATH LAB     EP ICD INSERT SINGLE N/A 5/8/2024    Procedure: Implantable Cardioverter Defibrillator Device & Lead Implant Dual;  Surgeon: Guero Black MD;  Location: [facility] CARDIAC CATH LAB      INJECTION, HYDROGEL SPACER N/A 2024    Procedure: INJECTION, HYDROGEL SPACER AND FIDUCIAL MARKER PLACEMENT;  Surgeon: Stanislaw Barros MD;  Location: [facility]     INSERT VENTRICULAR ASSIST DEVICE LEFT (HEARTMATE II) N/A 2024    Procedure: Median Sternotomy, INSERTION of LEFT VENTRICULAR ASSIST DEVICE (HEARTMATE III), cardiopulmonary bypass, transesophageal echocardiogram performed by anesthesia.;  Surgeon: Mulvihill, Michael, MD;  Location: [facility]     IRRIGATION AND DEBRIDEMENT CHEST WASHOUT, COMBINED Right 2024    Procedure: Exploration of chest, chest washout;  Surgeon: Mulvihill, Michael, MD;  Location: [facility]       FAMILY HISTORY:  Family History   Problem Relation Age of Onset     Other Cancer Mother      Obesity Mother      GI problems Father      Uterine Cancer Sister        SOCIAL HISTORY:  Social History     Socioeconomic History     Marital status:      Spouse name: None     Number of children: None     Years of education: None     Highest education level: None   Tobacco Use     Smoking status: Former     Current packs/day: 0.00     Average packs/day: 0.3 packs/day for 30.0 years (7.5 ttl pk-yrs)     Types: Cigarettes     Start date: 10/26/1993     Quit date: 10/26/2023     Years since quittin.0     Passive exposure: Past     Smokeless tobacco: Never     Tobacco comments:     Quit 10/26/2023   Vaping Use     Vaping status: Never Used   Substance and Sexual Activity     Alcohol use: Yes     Comment: 1-2 beers per day     Drug use: No     Sexual activity: Yes     Partners: Female     Birth control/protection: None   Other Topics Concern     Parent/sibling w/ CABG, MI or angioplasty before 65F 55M? No     Social Drivers of Health     Financial Resource Strain: Low Risk  (10/23/2024)    Financial Resource Strain      Within the past 12 months, have you or your family members you live with been unable to get utilities (heat, electricity) when it was really needed?: No   Food  Insecurity: Low Risk  (10/23/2024)    Food Insecurity      Within the past 12 months, did you worry that your food would run out before you got money to buy more?: No      Within the past 12 months, did the food you bought just not last and you didn t have money to get more?: No   Transportation Needs: Low Risk  (10/23/2024)    Transportation Needs      Within the past 12 months, has lack of transportation kept you from medical appointments, getting your medicines, non-medical meetings or appointments, work, or from getting things that you need?: No   Physical Activity: Inactive (11/16/2023)    Exercise Vital Sign      Days of Exercise per Week: 0 days      Minutes of Exercise per Session: 0 min   Social Connections: Unknown (11/16/2023)    Social Connection and Isolation Panel [NHANES]      Marital Status:    Interpersonal Safety: Low Risk  (10/23/2024)    Interpersonal Safety      Do you feel physically and emotionally safe where you currently live?: Yes      Within the past 12 months, have you been hit, slapped, kicked or otherwise physically hurt by someone?: No      Within the past 12 months, have you been humiliated or emotionally abused in other ways by your partner or ex-partner?: No   Recent Concern: Interpersonal Safety - High Risk (10/17/2024)    Interpersonal Safety      Do you feel physically and emotionally safe where you currently live?: No      Within the past 12 months, have you been hit, slapped, kicked or otherwise physically hurt by someone?: No      Within the past 12 months, have you been humiliated or emotionally abused in other ways by your partner or ex-partner?: No   Housing Stability: Low Risk  (10/23/2024)    Housing Stability      Do you have housing? : Yes      Are you worried about losing your housing?: No   Recent Concern: Housing Stability - High Risk (9/1/2024)    Housing Stability      Do you have housing? : No      Are you worried about losing your housing?: No  "      MEDICATIONS:  Current Outpatient Medications   Medication Sig Dispense Refill     acetaminophen (TYLENOL) 325 MG tablet Take 2 tablets (650 mg) by mouth every 4 hours as needed for other (For optimal non-opioid multimodal pain management to improve pain control.).       amiodarone (PACERONE) 200 MG tablet Take 1 tablet (200 mg) by mouth daily. 90 tablet 3     amLODIPine (NORVASC) 10 MG tablet Take 0.5 tablets (5 mg) by mouth daily. 45 tablet 3     atorvastatin (LIPITOR) 80 MG tablet Take 1 tablet (80 mg) by mouth every evening. 90 tablet 3     digoxin (LANOXIN) 62.5 MCG tablet Take 1 tablet (62.5 mcg) by mouth daily. 30 tablet 11     escitalopram (LEXAPRO) 10 MG tablet Take 1 tablet (10 mg) by mouth or Feeding Tube daily. 90 tablet 3     gabapentin (NEURONTIN) 100 MG capsule Take 1 capsule (100 mg) by mouth or Feeding Tube at bedtime. 90 capsule 3     hydrALAZINE (APRESOLINE) 25 MG tablet Take 1 tablet (25 mg) by mouth 2 times daily. 180 tablet 3     magnesium hydroxide (MILK OF MAGNESIA) 400 MG/5ML suspension Take 30 mLs by mouth daily as needed for constipation (Use if polyethylene glycol (Miralax) is not effective after 24 hours.). 354 mL 0     magnesium oxide (MAG-OX) 400 MG tablet Take 1 tablet (400 mg) by mouth daily. 90 tablet 3     melatonin 10 MG TABS tablet Take 1 tablet (10 mg) by mouth every evening.       multivitamin w/minerals (THERA-VIT-M) tablet Take 1 tablet by mouth daily. 90 tablet 3     warfarin ANTICOAGULANT (COUMADIN) 2.5 MG tablet Take 1 tablet (2.5 mg) by mouth every evening. 90 tablet 3     No current facility-administered medications for this visit.        ALLERGIES:     Allergies   Allergen Reactions     Brilinta [Ticagrelor] Other (See Comments) and Difficulty breathing     Per pt and spouse, hyperventilation.     Clonazepam Other (See Comments)     Per spouse, acted like a zombie and he was shaky, could barely talk.       PHYSICAL EXAM:  Ht 1.676 m (5' 6\")   Wt 66.7 kg (147 lb)  "  BMI 23.73 kg/m      Video visit    LABS:    CMP  Last Comprehensive Metabolic Panel:  Sodium   Date Value Ref Range Status   01/31/2025 131 (L) 135 - 145 mmol/L Final   05/26/2021 141 133 - 144 mmol/L Final     Sodium Whole Blood   Date Value Ref Range Status   10/27/2023 138 135 - 145 mmol/L Final     Potassium   Date Value Ref Range Status   01/31/2025 3.6 3.4 - 5.3 mmol/L Final   05/26/2021 4.5 3.4 - 5.3 mmol/L Final     Potassium Whole Blood   Date Value Ref Range Status   09/23/2024 2.5 (LL) 3.4 - 5.3 mmol/L Final     Potassium POCT   Date Value Ref Range Status   10/23/2024 3.8 3.4 - 5.3 mmol/L Final     Chloride   Date Value Ref Range Status   01/31/2025 96 (L) 98 - 107 mmol/L Final   05/26/2021 105 94 - 109 mmol/L Final     Chloride Whole Blood   Date Value Ref Range Status   10/27/2023 103 94 - 109 mmol/L Final     Carbon Dioxide   Date Value Ref Range Status   05/26/2021 29 20 - 32 mmol/L Final     Carbon Dioxide (CO2)   Date Value Ref Range Status   01/31/2025 25 22 - 29 mmol/L Final     Carbon Dioxide Whole Blood   Date Value Ref Range Status   10/27/2023 27 20 - 32 mmol/L Final     Anion Gap   Date Value Ref Range Status   01/31/2025 10 7 - 15 mmol/L Final   05/26/2021 7 3 - 14 mmol/L Final     Glucose   Date Value Ref Range Status   01/31/2025 106 (H) 70 - 99 mg/dL Final   05/26/2021 125 (H) 70 - 99 mg/dL Final     GLUCOSE BY METER POCT   Date Value Ref Range Status   11/09/2024 111 (H) 70 - 99 mg/dL Final     Urea Nitrogen   Date Value Ref Range Status   01/31/2025 10.0 8.0 - 23.0 mg/dL Final   05/26/2021 41 (H) 7 - 30 mg/dL Final     Creatinine   Date Value Ref Range Status   01/31/2025 0.80 0.67 - 1.17 mg/dL Final   05/26/2021 0.80 0.66 - 1.25 mg/dL Final     GFR Estimate   Date Value Ref Range Status   01/31/2025 >90 >60 mL/min/1.73m2 Final     Comment:     eGFR calculated using 2021 CKD-EPI equation.   05/26/2021 90 >60 mL/min/[1.73_m2] Final     Comment:     Non  GFR  "Calc  Starting 12/18/2018, serum creatinine based estimated GFR (eGFR) will be   calculated using the Chronic Kidney Disease Epidemiology Collaboration   (CKD-EPI) equation.       GFR, ESTIMATED POCT   Date Value Ref Range Status   10/23/2024 >60 >60 mL/min/1.73m2 Final     Calcium   Date Value Ref Range Status   01/31/2025 8.5 (L) 8.8 - 10.4 mg/dL Final   05/26/2021 8.3 (L) 8.5 - 10.1 mg/dL Final     Bilirubin Total   Date Value Ref Range Status   01/31/2025 0.4 <=1.2 mg/dL Final   03/20/2018 0.5 0.2 - 1.3 mg/dL Final     Alkaline Phosphatase   Date Value Ref Range Status   01/31/2025 110 40 - 150 U/L Final   03/20/2018 82 40 - 150 U/L Final     ALT   Date Value Ref Range Status   01/31/2025 16 0 - 70 U/L Final   03/20/2018 22 0 - 70 U/L Final     AST   Date Value Ref Range Status   01/31/2025 24 0 - 45 U/L Final   03/20/2018 20 0 - 45 U/L Final       NT-proBNP       LDH      TROP  No results found for: \"TROPI\", \"TROPONIN\", \"TROPR\", \"TROPN\"    CBC  CBC RESULTS:   Recent Labs   Lab Test 11/13/24  0910   WBC 5.4   RBC 3.49*   HGB 10.6*   HCT 31.3*   MCV 90   MCH 30.4   MCHC 33.9   RDW 15.1*          LIPIDS  Recent Labs   Lab Test 09/04/24  0626 08/31/24  0414   CHOL 85 83   HDL 29* 25*   LDL 45 46   TRIG 56 59       TSH  TSH   Date Value Ref Range Status   09/04/2024 2.45 0.30 - 4.20 uIU/mL Final       HBA1C  Lab Results   Component Value Date    A1C 5.4 09/18/2024    A1C 4.8 03/13/2023         CARDIAC DATA:    EKG:  10/23/24: Atrial fibrillation, Left anterior fascicular block, Possible Lateral infarct , age undetermined     Echo:  10/23/24  LVAD cannula was seen in the expected anatomic position in the LV apex.  HM3 at 5100RPM.  LVIDd 47mm.  Septum normal.  Aortic valve open intermittently. No AI.  Normal flow velocities.  Global right ventricular function is mildly to moderately reduced.  Small circumferential pericardial effusion is present without any hemodynamic significance.    Cardiac " Catheterization:  [facility] 10/23/24  BSA 1.75 m2  /89/101 mmHg  RA --/--/6 mmHg  RV 30/3mmHg  PA 30/14/20 mmHg  PCWP --/--/9 mmHg  TD CO/CI 4.1/2.34   Goldy CO/CI 4.54/2.59   PVR 2.68 (TD)  PA sat 65%   PCW Oximeter sat 95%  Hgb 10.5 g/dL Right sided filling pressures are normal. Left sided filling pressures are normal. Normal PA pressures. Normal cardiac output level.       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 NYHA Class III.     10/23/24 RA 6, PA 40/14/20, PCW 9, Goldy CO/CI 4.54/2.59 at 5100 rpm     Fluid status: mostly has been hypo-volemic, and is now on an aggressive oral hydration regimen.  He is drinking fluid intake >120 ounce per day, mainly Gatorade/water, avoid soda,   ACEi/ARB/ARNi: off losartan due to hypotension  Vasodilator: hydralazine 25 mg q8h.  amlodipine 5 mg daily --> may need further reduction in the future  BB: deferred due to recent VAD. Sometimes misses mid day dose hydralazine  Aldosterone antagonist: STOPPED blane due to hypoNa  SGLT2i: STOPPED empagliflozin 10 mg due to propensity for hypovolemia    Also advised to increase salt intake in the food, salty snacks 3 times per day and increase added salt to the food.  As a next step may also need to consider adding salt tablets.  We discussed that all of this is the opposite of what we do for heart failure patients, but since his LVAD implant, he has robust urinary output and is auto diuresing, and with persistent low flow alarms, speed drops, dizziness, hypotension -would benefit from increased fluid and salt  intake.  We discussed that none of these are perfect and may need continued adjustments and close monitoring based on his clinical status.    With above plans, he initially improved with no low flow alarms since 12/8/24, improving lightheadedness and no further syncope    Today he notes recurrent dizziness, generalized weakness and with new concern for possible GI bleed.  He tells me his MAP at home today is 90.  We discussed options including presenting to the ER today but he prefers to go for repeat labs tomorrow and if hemoglobin is dropping or symptoms persist/worsen, then go to the ER.    Ongoing cardiac rehab last week, going [facility]    SCD prophylaxis: ICD  MAP: goal 70-90, encouraging POs ulitize hold parameters. MAP lower today but will continue current regiment based on improved clinical status  LDH trends: stable   Anticoagulation: warfarin dosing per pharmacy, INR goal 2-3  Antiplatelet: ASA not indicated in LVAD population, > 6 months s/p STEMI    Other:   - No fluid restriction, encourage drinks with Na/electrolytes, increase fluid intake to greater than 120 ounce per day, eat frequent salty snacks  - Patient has a low flow controller, will not alarm until flow <2. Hematocrit is set to match his true hematocrit  - Dr. Smith has reviewed inflow and outflow positioning, nothing to do      Left ventricular assist device interrogation:virtual visit       # Low flow alarms  # Elevated PIs  # Labile MAPs   # Suspected orthostatic hypotension, resolved  Low flows seems releated to hypertension and some hypovolemia as well. Have improved with re-timing hydralazine and after increasing oral fluids. CTA was done on  10/17/24 with no outflow graft ostruction. The outflow graft does make an acute angle as it approaches the aorta, discussed with Dr. Mulvihill, unlikely to be impacting the low flows at this time.  RHC with reasonable filling pressures as above. He has been asymptomatic.  - Decreased speed to 5000 on  10/27  - Losartan stopped previously for low BP  - SGLT2 inihibitor stopped for hypovolemia  - Hydralazine and amlodipine doses reduced as above  - Stopped fludrocortisone 0.1 mg daily previously  - Increase PO intake as above, specifically right before bed, non free-water sources due to hypoNa  - Temp drop in hematocrit on monitor 10/29-10/30, low-flow controller change out 10/30, changed hematocrit back 10/31  - This was overall improved, now concern for possible new GI bleed     # History of VT/VF arrest 2023  # AFib s/p DCCV 9/2024 and again 9/30/2024   Successfully cardioverted in early September for AF. Since then EKG suggested AF on 9/21. Tele is only available from as early as 9/22. The patient was started on hep gtt 9/21, but unclear if was in AF before this. While the patient was on hep gtt until INR was therapeutic, it is not possible to assess rhythm prior to 9/21. Systemic AC was off on 9/18-9/20. S/p MELBA and DCCV on 9/30 with conversion to sinus rhythm.  10/23 device interrogation shows persistent episode of AF since 10/10 with rates   - Continue amiodarone 200 mg daily  - S/p digoxin load, continue digoxin 62.5 mcg daily   - Ongoing a fib- given ongoing low flows- deferring cardioversion for now given reasonable rate control.   - AC as above     # Hypovolemia hyponatremia, stable  improving  - encourage high sodium PO intake/gatorade and salty foods     # Visual changes, resolved.   # Generalized weakness, improved   # Headache, resolved  Stoke code called 9/29 for visual changes - right eye with decreased upper half of vision and bluish haze. Resolved after 30 minutes. Seen by opthalmology and neuro. Negative head CT and CTA head and neck. He has had 3 episodes which last about 5 minutes before resolving completely.      On 10/9 Stroke code activated due to concern of generalized weakness, numbness and headache. LKW reported as 0715 when he worked with therapies and shortly after started  "feeling generally weak. Then around 1500 he had onset of moderate \"tension\" type headache and tingling in the bilateral fingertips. Per RRT URIAH patient now reports tingling has resolved and headache is improving. MAP 48, INR 2.2. Given absence of focal deficits and rapidly improving symptoms, opted to cancel stroke.  CT head negative, though did show chronic maxillary sinusitis.      Stroke code called 10/23 when seen in clinic with report of new RUE weakness. He was notably hypotensive at this time with MAP ~45 with new right pronator drift. Evaluated in ED, CT and CTA head and neck negative. RUE weakness resolved.   - monitor for changes- no current symptoms    Started cardiac rehab Jan 2025        # Cognitive changes  - Formal neuropsych testing this admission reveiling some cognitive changes  - 24/7 care during his first 30 days, will likely not need therafter, but continue to assess with each LVAD appointment (per Ho M[facility]dez note from 11/8 [place] has passed his VAD education)  - Needs formal driving assessment with [facility] or OT prior to being cleared to drive, wife aware and also discussed with patient at time of discharge  - Repeat neuropsych testing in 1 year  - Referred to behavioral neurology in place      I spent 42 minutes in care of the patient today including obtaining recent medical history, personally reviewing recent cardiac testing and/or lab results, today's examination, discussion of testing results and care recommendations with patient.       Thank you for the opportunity to participate in this patient's care. Please feel free to reach out with any questions or concerns.      Ham Cruz MD, [facility]  Associate Professor of Medicine  Advanced Heart Failure, LVAD & Cardiac Transplant  Director, Cardio-Obstetrics  Cardio-Oncology  [facility]      Please do not hesitate to contact me if you have any questions/concerns.     Sincerely,     Ham Cruz MD  "

## 2025-02-03 NOTE — PROGRESS NOTES
[facility]  Advanced Heart Failure Clinic    Patient Name: Duane C Johnson   : 1950   Date of Visit: 2025    Primary Care Physician: Jose Coles MD     Referring Physician: No ref. provider found   Reason for Visit: LVAD    Virtual Visit Details     Type of service:  Video Visit   Video Start Time: 2:50 pm  Video End Time:3:25 pm    Originating Location (pt. Location): Home    Distant Location (provider location):  On-site  Platform used for Video Visit: [facility]       Dear Dr. Sanket MD     I had the pleasure of seeing Duane C Johnson today in the [facility]. As you know Duane C Johnson is a pleasant 74 year old year old male, who presents today for further evaluation of LVAD.    Duane has a history of end-stage cardiomyopathy, history of coronary artery disease, previous STEMI, history of VT VF arrest, status post placement of ICD, history of A-fib a flutter with previous cardioversion, status post HeartMate 3 LVAD implantation 2024.    Duane was discharged to ARU on 10/5/24. There, he continued to have frequent low flow alarms with labile BPs and has required frequent adjustments to BP medications, initiation of florinef, as well as IVF and increased PO intake. Seen in clinic on 10/23 where he was presyncopal with MAPs in 40s. He was sent from clinic to ED for further evaluation. Admitted 10/23/24 for further workup of hypotension, hypovolemia, and low flows. Underwent stroke eval for neurological changes with negative CT. Symptoms improved following 2L IVF and improved MAPs. He was resumed on low doses of GDMT/afterload reduction. During this hospitalization continued to have several more runs of low flows. MAPs/meds optimized and prior to discharge he was without low flows/high PIs x 3 days. Controlled was also switched to low flow controlled. Discharge was delayed due to needing additional LVAD education. Consulted neuropsych due to  concerns for cognitive changes - Alzheimer's disease or possibly mixed pathology (mild cognitive impairment).     Following discharge, he presented to the emergency room on 11/10/2024 with witnessed syncopal episode, feeling lightheaded which led to a fall to the ground but he did not strike his head or suffer any injury.  He reported that he did not lose consciousness although EMS reported that family witnessed a brief loss of consciousness episode.  The ER spoke to Dr. Nicolas, who suggested aggressive hydration as he is known to have hypovolemia and orthostasis and he had not had much to eat or drink that day before this episode. He did not have a low flow alarm then.    We have been seeing him in clinic frequently, and have mostly cut back on some of his anti hypertensives and working on a plan of aggressive hydration. He saw me last on 1/6/25, when he was doing well    ____________________    Today he reports blood in stool, fatigue, generalized weakness, dizziness/shakiness today. He sat down on the floor next to the bed as he went to lay down.  Last visit his lightheadedness was better, but now is worse for 4-5 days. He had labs on Thursday when Hb was stable but symptoms have worsened.  He has red blood in stool, and thinks this is a very small amount. He has normal bowel movements.    He is drinking a lot (gatorade), and he and his wife confirm that he is drinking at least 100 - 120 oz of fluid per day water/ gatorade, milk, one 14 oz of Core 42 protein drink, has cut down on mountain dew to less than one per day. His low flow alarm limit is set to 2L/min, last alarm was on 12/8/24, very brief.    He is mostly not using his walker, and reports he is using it if walking further than just going from couch to bathroom (about 20 feet) when he holds on to walls/doorways. His wife is with him 24x7, she works from home.    He has started cardiac rehab Jan 2025, going MWF    Home BPs - Doppler at home MAPs in 86-90  mm Hg range  Home weights - not checking    ROS:  A complete 10-point ROS was negative except as above.    PAST MEDICAL HISTORY:  Past Medical History:   Diagnosis Date    Benign essential hypertension     CAD (coronary artery disease)     Chronic systolic heart failure (H)     Hypertension     ST elevation myocardial infarction (STEMI), unspecified artery (H)     Ventricular fibrillation (H)        PAST SURGICAL HISTORY:  Past Surgical History:   Procedure Laterality Date    ANESTHESIA CARDIOVERSION N/A 9/5/2024    Procedure: Anesthesia cardioversion;  Surgeon: GENERIC ANESTHESIA PROVIDER;  Location: [facility] OR    ANESTHESIA CARDIOVERSION N/A 9/30/2024    Procedure: Anesthesia cardioversion;  Surgeon: GENERIC ANESTHESIA PROVIDER;  Location: [facility] OR    COLONOSCOPY N/A 3/23/2023    Procedure: COLONOSCOPY, FLEXIBLE, WITH LESION REMOVAL USING SNARE;  Surgeon: Jose Faustin MD;  Location: [facility]    CV CENTRAL VENOUS CATHETER PLACEMENT N/A 10/26/2023    Procedure: Central Venous Catheter Placement;  Surgeon: Rob Lyles MD;  Location: [facility] CARDIAC CATH LAB    CV CORONARY ANGIOGRAM N/A 10/27/2023    Procedure: Coronary Angiogram;  Surgeon: Rob Lyles MD;  Location: [facility] CARDIAC CATH LAB    CV CORONARY ANGIOGRAM N/A 10/26/2023    Procedure: Coronary Angiogram;  Surgeon: Rob Lyles MD;  Location:  HEART CARDIAC CATH LAB    CV LEFT HEART CATH N/A 10/26/2023    Procedure: Left Heart Catheterization;  Surgeon: Rob Lyles MD;  Location:  HEART CARDIAC CATH LAB    CV PCI N/A 10/27/2023    Procedure: Percutaneous Coronary Intervention;  Surgeon: Rob Lyles MD;  Location: [facility] CARDIAC CATH LAB    CV PCI STENT DRUG ELUTING N/A 10/26/2023    Procedure: Percutaneous Coronary Intervention Stent;  Surgeon: Rob Lyles MD;  Location: [facility] CARDIAC CATH LAB    CV RIGHT HEART CATH MEASUREMENTS RECORDED N/A 5/6/2024    Procedure: Heart Cath Right  Heart Cath;  Surgeon: Rob Lyles MD;  Location:  HEART CARDIAC CATH LAB    CV RIGHT HEART CATH MEASUREMENTS RECORDED N/A 9/3/2024    Procedure: Right Heart Catheterization;  Surgeon: Aaron Mesa MD;  Location:  HEART CARDIAC CATH LAB    CV RIGHT HEART CATH MEASUREMENTS RECORDED N/A 10/24/2024    Procedure: Right Heart Catheterization;  Surgeon: Haile Braden MD;  Location:  HEART CARDIAC CATH LAB    EP ICD INSERT SINGLE N/A 2024    Procedure: Implantable Cardioverter Defibrillator Device & Lead Implant Dual;  Surgeon: Guero Black MD;  Location:  HEART CARDIAC CATH LAB    INJECTION, HYDROGEL SPACER N/A 2024    Procedure: INJECTION, HYDROGEL SPACER AND FIDUCIAL MARKER PLACEMENT;  Surgeon: Stanislaw Barros MD;  Location: [facility] OR    INSERT VENTRICULAR ASSIST DEVICE LEFT (HEARTMATE II) N/A 2024    Procedure: Median Sternotomy, INSERTION of LEFT VENTRICULAR ASSIST DEVICE (HEARTMATE III), cardiopulmonary bypass, transesophageal echocardiogram performed by anesthesia.;  Surgeon: Mulvihill, Michael, MD;  Location: [facility] OR    IRRIGATION AND DEBRIDEMENT CHEST WASHOUT, COMBINED Right 2024    Procedure: Exploration of chest, chest washout;  Surgeon: Mulvihill, Michael, MD;  Location: [facility] OR       FAMILY HISTORY:  Family History   Problem Relation Age of Onset    Other Cancer Mother     Obesity Mother     GI problems Father     Uterine Cancer Sister        SOCIAL HISTORY:  Social History     Socioeconomic History    Marital status:      Spouse name: None    Number of children: None    Years of education: None    Highest education level: None   Tobacco Use    Smoking status: Former     Current packs/day: 0.00     Average packs/day: 0.3 packs/day for 30.0 years (7.5 ttl pk-yrs)     Types: Cigarettes     Start date: 10/26/1993     Quit date: 10/26/2023     Years since quittin.0     Passive exposure: Past    Smokeless tobacco: Never    Tobacco comments:      Quit 10/26/2023   Vaping Use    Vaping status: Never Used   Substance and Sexual Activity    Alcohol use: Yes     Comment: 1-2 beers per day    Drug use: No    Sexual activity: Yes     Partners: Female     Birth control/protection: None   Other Topics Concern    Parent/sibling w/ CABG, MI or angioplasty before 65F 55M? No     Social Drivers of Health     Financial Resource Strain: Low Risk  (10/23/2024)    Financial Resource Strain     Within the past 12 months, have you or your family members you live with been unable to get utilities (heat, electricity) when it was really needed?: No   Food Insecurity: Low Risk  (10/23/2024)    Food Insecurity     Within the past 12 months, did you worry that your food would run out before you got money to buy more?: No     Within the past 12 months, did the food you bought just not last and you didn t have money to get more?: No   Transportation Needs: Low Risk  (10/23/2024)    Transportation Needs     Within the past 12 months, has lack of transportation kept you from medical appointments, getting your medicines, non-medical meetings or appointments, work, or from getting things that you need?: No   Physical Activity: Inactive (11/16/2023)    Exercise Vital Sign     Days of Exercise per Week: 0 days     Minutes of Exercise per Session: 0 min   Social Connections: Unknown (11/16/2023)    Social Connection and Isolation Panel [NHANES]     Marital Status:    Interpersonal Safety: Low Risk  (10/23/2024)    Interpersonal Safety     Do you feel physically and emotionally safe where you currently live?: Yes     Within the past 12 months, have you been hit, slapped, kicked or otherwise physically hurt by someone?: No     Within the past 12 months, have you been humiliated or emotionally abused in other ways by your partner or ex-partner?: No   Recent Concern: Interpersonal Safety - High Risk (10/17/2024)    Interpersonal Safety     Do you feel physically and emotionally safe  where you currently live?: No     Within the past 12 months, have you been hit, slapped, kicked or otherwise physically hurt by someone?: No     Within the past 12 months, have you been humiliated or emotionally abused in other ways by your partner or ex-partner?: No   Housing Stability: Low Risk  (10/23/2024)    Housing Stability     Do you have housing? : Yes     Are you worried about losing your housing?: No   Recent Concern: Housing Stability - High Risk (9/1/2024)    Housing Stability     Do you have housing? : No     Are you worried about losing your housing?: No       MEDICATIONS:  Current Outpatient Medications   Medication Sig Dispense Refill    acetaminophen (TYLENOL) 325 MG tablet Take 2 tablets (650 mg) by mouth every 4 hours as needed for other (For optimal non-opioid multimodal pain management to improve pain control.).      amiodarone (PACERONE) 200 MG tablet Take 1 tablet (200 mg) by mouth daily. 90 tablet 3    amLODIPine (NORVASC) 10 MG tablet Take 0.5 tablets (5 mg) by mouth daily. 45 tablet 3    atorvastatin (LIPITOR) 80 MG tablet Take 1 tablet (80 mg) by mouth every evening. 90 tablet 3    digoxin (LANOXIN) 62.5 MCG tablet Take 1 tablet (62.5 mcg) by mouth daily. 30 tablet 11    escitalopram (LEXAPRO) 10 MG tablet Take 1 tablet (10 mg) by mouth or Feeding Tube daily. 90 tablet 3    gabapentin (NEURONTIN) 100 MG capsule Take 1 capsule (100 mg) by mouth or Feeding Tube at bedtime. 90 capsule 3    hydrALAZINE (APRESOLINE) 25 MG tablet Take 1 tablet (25 mg) by mouth 2 times daily. 180 tablet 3    magnesium hydroxide (MILK OF MAGNESIA) 400 MG/5ML suspension Take 30 mLs by mouth daily as needed for constipation (Use if polyethylene glycol (Miralax) is not effective after 24 hours.). 354 mL 0    magnesium oxide (MAG-OX) 400 MG tablet Take 1 tablet (400 mg) by mouth daily. 90 tablet 3    melatonin 10 MG TABS tablet Take 1 tablet (10 mg) by mouth every evening.      multivitamin w/minerals (THERA-VIT-M)  "tablet Take 1 tablet by mouth daily. 90 tablet 3    warfarin ANTICOAGULANT (COUMADIN) 2.5 MG tablet Take 1 tablet (2.5 mg) by mouth every evening. 90 tablet 3     No current facility-administered medications for this visit.        ALLERGIES:     Allergies   Allergen Reactions    Brilinta [Ticagrelor] Other (See Comments) and Difficulty breathing     Per pt and spouse, hyperventilation.    Clonazepam Other (See Comments)     Per spouse, acted like a zombie and he was shaky, could barely talk.       PHYSICAL EXAM:  Ht 1.676 m (5' 6\")   Wt 66.7 kg (147 lb)   BMI 23.73 kg/m      Video visit    LABS:    CMP  Last Comprehensive Metabolic Panel:  Sodium   Date Value Ref Range Status   01/31/2025 131 (L) 135 - 145 mmol/L Final   05/26/2021 141 133 - 144 mmol/L Final     Sodium Whole Blood   Date Value Ref Range Status   10/27/2023 138 135 - 145 mmol/L Final     Potassium   Date Value Ref Range Status   01/31/2025 3.6 3.4 - 5.3 mmol/L Final   05/26/2021 4.5 3.4 - 5.3 mmol/L Final     Potassium Whole Blood   Date Value Ref Range Status   09/23/2024 2.5 (LL) 3.4 - 5.3 mmol/L Final     Potassium POCT   Date Value Ref Range Status   10/23/2024 3.8 3.4 - 5.3 mmol/L Final     Chloride   Date Value Ref Range Status   01/31/2025 96 (L) 98 - 107 mmol/L Final   05/26/2021 105 94 - 109 mmol/L Final     Chloride Whole Blood   Date Value Ref Range Status   10/27/2023 103 94 - 109 mmol/L Final     Carbon Dioxide   Date Value Ref Range Status   05/26/2021 29 20 - 32 mmol/L Final     Carbon Dioxide (CO2)   Date Value Ref Range Status   01/31/2025 25 22 - 29 mmol/L Final     Carbon Dioxide Whole Blood   Date Value Ref Range Status   10/27/2023 27 20 - 32 mmol/L Final     Anion Gap   Date Value Ref Range Status   01/31/2025 10 7 - 15 mmol/L Final   05/26/2021 7 3 - 14 mmol/L Final     Glucose   Date Value Ref Range Status   01/31/2025 106 (H) 70 - 99 mg/dL Final   05/26/2021 125 (H) 70 - 99 mg/dL Final     GLUCOSE BY METER POCT   Date Value " "Ref Range Status   11/09/2024 111 (H) 70 - 99 mg/dL Final     Urea Nitrogen   Date Value Ref Range Status   01/31/2025 10.0 8.0 - 23.0 mg/dL Final   05/26/2021 41 (H) 7 - 30 mg/dL Final     Creatinine   Date Value Ref Range Status   01/31/2025 0.80 0.67 - 1.17 mg/dL Final   05/26/2021 0.80 0.66 - 1.25 mg/dL Final     GFR Estimate   Date Value Ref Range Status   01/31/2025 >90 >60 mL/min/1.73m2 Final     Comment:     eGFR calculated using 2021 CKD-EPI equation.   05/26/2021 90 >60 mL/min/[1.73_m2] Final     Comment:     Non  GFR Calc  Starting 12/18/2018, serum creatinine based estimated GFR (eGFR) will be   calculated using the Chronic Kidney Disease Epidemiology Collaboration   (CKD-EPI) equation.       GFR, ESTIMATED POCT   Date Value Ref Range Status   10/23/2024 >60 >60 mL/min/1.73m2 Final     Calcium   Date Value Ref Range Status   01/31/2025 8.5 (L) 8.8 - 10.4 mg/dL Final   05/26/2021 8.3 (L) 8.5 - 10.1 mg/dL Final     Bilirubin Total   Date Value Ref Range Status   01/31/2025 0.4 <=1.2 mg/dL Final   03/20/2018 0.5 0.2 - 1.3 mg/dL Final     Alkaline Phosphatase   Date Value Ref Range Status   01/31/2025 110 40 - 150 U/L Final   03/20/2018 82 40 - 150 U/L Final     ALT   Date Value Ref Range Status   01/31/2025 16 0 - 70 U/L Final   03/20/2018 22 0 - 70 U/L Final     AST   Date Value Ref Range Status   01/31/2025 24 0 - 45 U/L Final   03/20/2018 20 0 - 45 U/L Final       NT-proBNP       LDH      TROP  No results found for: \"TROPI\", \"TROPONIN\", \"TROPR\", \"TROPN\"    CBC  CBC RESULTS:   Recent Labs   Lab Test 11/13/24  0910   WBC 5.4   RBC 3.49*   HGB 10.6*   HCT 31.3*   MCV 90   MCH 30.4   MCHC 33.9   RDW 15.1*          LIPIDS  Recent Labs   Lab Test 09/04/24  0626 08/31/24  0414   CHOL 85 83   HDL 29* 25*   LDL 45 46   TRIG 56 59       TSH  TSH   Date Value Ref Range Status   09/04/2024 2.45 0.30 - 4.20 uIU/mL Final       HBA1C  Lab Results   Component Value Date    A1C 5.4 09/18/2024    " A1C 4.8 03/13/2023         CARDIAC DATA:    EKG:  10/23/24: Atrial fibrillation, Left anterior fascicular block, Possible Lateral infarct , age undetermined     Echo:  10/23/24  LVAD cannula was seen in the expected anatomic position in the LV apex.  HM3 at 5100RPM.  LVIDd 47mm.  Septum normal.  Aortic valve open intermittently. No AI.  Normal flow velocities.  Global right ventricular function is mildly to moderately reduced.  Small circumferential pericardial effusion is present without any hemodynamic significance.    Cardiac Catheterization:  RHC 10/23/24  BSA 1.75 m2  /89/101 mmHg  RA --/--/6 mmHg  RV 30/3mmHg  PA 30/14/20 mmHg  PCWP --/--/9 mmHg  TD CO/CI 4.1/2.34   Goldy CO/CI 4.54/2.59   PVR 2.68 (TD)  PA sat 65%   PCW Oximeter sat 95%  Hgb 10.5 g/dL Right sided filling pressures are normal. Left sided filling pressures are normal. Normal PA pressures. Normal cardiac output level.       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 NYHA Class III.     10/23/24 RA 6, PA 40/14/20, PCW 9, Goldy CO/CI 4.54/2.59 at 5100 rpm     Fluid status: mostly has been hypo-volemic, and is now on an aggressive oral hydration regimen.  He is drinking fluid intake >120 ounce per day, mainly Gatorade/water, avoid soda,   ACEi/ARB/ARNi: off losartan due to hypotension  Vasodilator: hydralazine 25 mg q8h.  amlodipine 5 mg daily --> may need further reduction in the future  BB: deferred due to recent VAD. Sometimes misses mid day dose hydralazine  Aldosterone antagonist: STOPPED blane due to  hypoNa  SGLT2i: STOPPED empagliflozin 10 mg due to propensity for hypovolemia    Also advised to increase salt intake in the food, salty snacks 3 times per day and increase added salt to the food.  As a next step may also need to consider adding salt tablets.  We discussed that all of this is the opposite of what we do for heart failure patients, but since his LVAD implant, he has robust urinary output and is auto diuresing, and with persistent low flow alarms, speed drops, dizziness, hypotension -would benefit from increased fluid and salt intake.  We discussed that none of these are perfect and may need continued adjustments and close monitoring based on his clinical status.    With above plans, he initially improved with no low flow alarms since 12/8/24, improving lightheadedness and no further syncope    Today he notes recurrent dizziness, generalized weakness and with new concern for possible GI bleed.  He tells me his MAP at home today is 90.  We discussed options including presenting to the ER today but he prefers to go for repeat labs tomorrow and if hemoglobin is dropping or symptoms persist/worsen, then go to the ER.    Ongoing cardiac rehab last week, going MWF    SCD prophylaxis: ICD  MAP: goal 70-90, encouraging POs ulitize hold parameters. MAP lower today but will continue current regiment based on improved clinical status  LDH trends: stable   Anticoagulation: warfarin dosing per pharmacy, INR goal 2-3  Antiplatelet: ASA not indicated in LVAD population, > 6 months s/p STEMI    Other:   - No fluid restriction, encourage drinks with Na/electrolytes, increase fluid intake to greater than 120 ounce per day, eat frequent salty snacks  - Patient has a low flow controller, will not alarm until flow <2. Hematocrit is set to match his true hematocrit  - Dr. Smith has reviewed inflow and outflow positioning, nothing to do      Left ventricular assist device interrogation:virtual visit       # Low flow  alarms  # Elevated PIs  # Labile MAPs   # Suspected orthostatic hypotension, resolved  Low flows seems releated to hypertension and some hypovolemia as well. Have improved with re-timing hydralazine and after increasing oral fluids. CTA was done on  10/17/24 with no outflow graft ostruction. The outflow graft does make an acute angle as it approaches the aorta, discussed with Dr. Mulvihill, unlikely to be impacting the low flows at this time.  RHC with reasonable filling pressures as above. He has been asymptomatic.  - Decreased speed to 5000 on 10/27  - Losartan stopped previously for low BP  - SGLT2 inihibitor stopped for hypovolemia  - Hydralazine and amlodipine doses reduced as above  - Stopped fludrocortisone 0.1 mg daily previously  - Increase PO intake as above, specifically right before bed, non free-water sources due to hypoNa  - Temp drop in hematocrit on monitor 10/29-10/30, low-flow controller change out 10/30, changed hematocrit back 10/31  - This was overall improved, now concern for possible new GI bleed     # History of VT/VF arrest 2023  # AFib s/p DCCV 9/2024 and again 9/30/2024   Successfully cardioverted in early September for AF. Since then EKG suggested AF on 9/21. Tele is only available from as early as 9/22. The patient was started on hep gtt 9/21, but unclear if was in AF before this. While the patient was on hep gtt until INR was therapeutic, it is not possible to assess rhythm prior to 9/21. Systemic AC was off on 9/18-9/20. S/p MELBA and DCCV on 9/30 with conversion to sinus rhythm.  10/23 device interrogation shows persistent episode of AF since 10/10 with rates   - Continue amiodarone 200 mg daily  - S/p digoxin load, continue digoxin 62.5 mcg daily   - Ongoing a fib- given ongoing low flows- deferring cardioversion for now given reasonable rate control.   - AC as above     # Hypovolemia hyponatremia, stable  improving  - encourage high sodium PO intake/gatorade and salty foods    "  # Visual changes, resolved.   # Generalized weakness, improved   # Headache, resolved  Stoke code called 9/29 for visual changes - right eye with decreased upper half of vision and bluish haze. Resolved after 30 minutes. Seen by opthalmology and neuro. Negative head CT and CTA head and neck. He has had 3 episodes which last about 5 minutes before resolving completely.      On 10/9 Stroke code activated due to concern of generalized weakness, numbness and headache. LKW reported as 0715 when he worked with therapies and shortly after started feeling generally weak. Then around 1500 he had onset of moderate \"tension\" type headache and tingling in the bilateral fingertips. Per RRT URIAH patient now reports tingling has resolved and headache is improving. MAP 48, INR 2.2. Given absence of focal deficits and rapidly improving symptoms, opted to cancel stroke.  CT head negative, though did show chronic maxillary sinusitis.      Stroke code called 10/23 when seen in clinic with report of new RUE weakness. He was notably hypotensive at this time with MAP ~45 with new right pronator drift. Evaluated in ED, CT and CTA head and neck negative. RUE weakness resolved.   - monitor for changes- no current symptoms    Started cardiac rehab Jan 2025        # Cognitive changes  - Formal neuropsych testing this admission reveiling some cognitive changes  - 24/7 care during his first 30 days, will likely not need therafter, but continue to assess with each LVAD appointment (per Ho Méndez note from 11/8 Duane has passed his VAD education)  - Needs formal driving assessment with DMV or OT prior to being cleared to drive, wife aware and also discussed with patient at time of discharge  - Repeat neuropsych testing in 1 year  - Referred to behavioral neurology in place      I spent 42 minutes in care of the patient today including obtaining recent medical history, personally reviewing recent cardiac testing and/or lab results, " today's examination, discussion of testing results and care recommendations with patient.       Thank you for the opportunity to participate in this patient's care. Please feel free to reach out with any questions or concerns.      Ham Cruz MD, [facility]  Associate Professor of Medicine  Advanced Heart Failure, LVAD & Cardiac Transplant  Director, Cardio-Obstetrics  Cardio-Oncology  [facility]

## 2025-02-03 NOTE — PROGRESS NOTES
"Virtual Visit Details    Type of service:  Video Visit   Video Start Time: {video visit start/end time for provider to select:237920}  Video End Time:{video visit start/end time for provider to select:302657}    Originating Location (pt. Location): {video visit patient location:012148::\"Home\"}  {PROVIDER LOCATION On-site should be selected for visits conducted from your clinic location or adjoining [facility] hospital, academic office, or other nearby [facility] building. Off-site should be selected for all other provider locations, including home:962043}  Distant Location (provider location):  {virtual location provider:102454}  Platform used for Video Visit: {Virtual Visit Platforms:700319::\"Miproto\"}  "

## 2025-02-04 ENCOUNTER — LAB (OUTPATIENT)
Dept: LAB | Facility: CLINIC | Age: 75
End: 2025-02-04
Payer: MEDICARE

## 2025-02-04 ENCOUNTER — CARE COORDINATION (OUTPATIENT)
Dept: CARDIOLOGY | Facility: CLINIC | Age: 75
End: 2025-02-04

## 2025-02-04 ENCOUNTER — HOSPITAL ENCOUNTER (INPATIENT)
Facility: CLINIC | Age: 75
DRG: 813 | End: 2025-02-04
Attending: STUDENT IN AN ORGANIZED HEALTH CARE EDUCATION/TRAINING PROGRAM
Payer: MEDICARE

## 2025-02-04 DIAGNOSIS — Z95.811 LVAD (LEFT VENTRICULAR ASSIST DEVICE) PRESENT (H): ICD-10-CM

## 2025-02-04 DIAGNOSIS — R79.1 SUPRATHERAPEUTIC INR: ICD-10-CM

## 2025-02-04 DIAGNOSIS — Z79.01 ANTICOAGULATED ON WARFARIN: ICD-10-CM

## 2025-02-04 DIAGNOSIS — K92.1 HEMATOCHEZIA: ICD-10-CM

## 2025-02-04 DIAGNOSIS — R53.1 GENERALIZED WEAKNESS: ICD-10-CM

## 2025-02-04 DIAGNOSIS — K92.2 LOWER GI BLEEDING: ICD-10-CM

## 2025-02-04 DIAGNOSIS — D64.9 ANEMIA, UNSPECIFIED TYPE: ICD-10-CM

## 2025-02-04 DIAGNOSIS — I50.22 CHRONIC SYSTOLIC CONGESTIVE HEART FAILURE (H): ICD-10-CM

## 2025-02-04 DIAGNOSIS — I48.91 ATRIAL FIBRILLATION, UNSPECIFIED TYPE (H): ICD-10-CM

## 2025-02-04 DIAGNOSIS — Z79.01 LONG TERM (CURRENT) USE OF ANTICOAGULANTS: Primary | ICD-10-CM

## 2025-02-04 DIAGNOSIS — Z95.811 HISTORY OF LEFT VENTRICULAR ASSIST DEVICE (LVAD) (H): ICD-10-CM

## 2025-02-04 LAB
ABO + RH BLD: NORMAL
ALBUMIN SERPL BCG-MCNC: 3.4 G/DL (ref 3.5–5.2)
ALBUMIN SERPL BCG-MCNC: 3.5 G/DL (ref 3.5–5.2)
ALP SERPL-CCNC: 105 U/L (ref 40–150)
ALP SERPL-CCNC: 112 U/L (ref 40–150)
ALT SERPL W P-5'-P-CCNC: 17 U/L (ref 0–70)
ALT SERPL W P-5'-P-CCNC: 19 U/L (ref 0–70)
ANION GAP SERPL CALCULATED.3IONS-SCNC: 10 MMOL/L (ref 7–15)
ANION GAP SERPL CALCULATED.3IONS-SCNC: 9 MMOL/L (ref 7–15)
ANTIBODY SCREEN, TUBE: NORMAL
APTT PPP: 52 SECONDS (ref 22–38)
AST SERPL W P-5'-P-CCNC: 24 U/L (ref 0–45)
AST SERPL W P-5'-P-CCNC: 25 U/L (ref 0–45)
ATRIAL RATE - MUSE: 70 BPM
BASOPHILS # BLD AUTO: 0 10E3/UL (ref 0–0.2)
BASOPHILS NFR BLD AUTO: 0 %
BILIRUB SERPL-MCNC: 0.5 MG/DL
BILIRUB SERPL-MCNC: 0.5 MG/DL
BUN SERPL-MCNC: 16 MG/DL (ref 8–23)
BUN SERPL-MCNC: 18.5 MG/DL (ref 8–23)
CALCIUM SERPL-MCNC: 8.6 MG/DL (ref 8.8–10.4)
CALCIUM SERPL-MCNC: 8.9 MG/DL (ref 8.8–10.4)
CHLORIDE SERPL-SCNC: 98 MMOL/L (ref 98–107)
CHLORIDE SERPL-SCNC: 99 MMOL/L (ref 98–107)
CREAT SERPL-MCNC: 0.8 MG/DL (ref 0.67–1.17)
CREAT SERPL-MCNC: 0.86 MG/DL (ref 0.67–1.17)
DIASTOLIC BLOOD PRESSURE - MUSE: NORMAL MMHG
EGFRCR SERPLBLD CKD-EPI 2021: 90 ML/MIN/1.73M2
EGFRCR SERPLBLD CKD-EPI 2021: >90 ML/MIN/1.73M2
EOSINOPHIL # BLD AUTO: 0.2 10E3/UL (ref 0–0.7)
EOSINOPHIL NFR BLD AUTO: 2 %
ERYTHROCYTE [DISTWIDTH] IN BLOOD BY AUTOMATED COUNT: 15.4 % (ref 10–15)
ERYTHROCYTE [DISTWIDTH] IN BLOOD BY AUTOMATED COUNT: 15.9 % (ref 10–15)
GLUCOSE SERPL-MCNC: 104 MG/DL (ref 70–99)
GLUCOSE SERPL-MCNC: 105 MG/DL (ref 70–99)
HCO3 SERPL-SCNC: 24 MMOL/L (ref 22–29)
HCO3 SERPL-SCNC: 27 MMOL/L (ref 22–29)
HCT VFR BLD AUTO: 27.8 % (ref 40–53)
HCT VFR BLD AUTO: 30.6 % (ref 40–53)
HGB BLD-MCNC: 10.3 G/DL (ref 13.3–17.7)
HGB BLD-MCNC: 9.5 G/DL (ref 13.3–17.7)
IMM GRANULOCYTES # BLD: 0 10E3/UL
IMM GRANULOCYTES NFR BLD: 0 %
INR PPP: 3.31 (ref 0.85–1.15)
INR PPP: 3.61 (ref 0.85–1.15)
INTERPRETATION ECG - MUSE: NORMAL
IRON BINDING CAPACITY (ROCHE): 182 UG/DL (ref 240–430)
IRON SATN MFR SERPL: 18 % (ref 15–46)
IRON SERPL-MCNC: 32 UG/DL (ref 61–157)
LDH SERPL L TO P-CCNC: 209 U/L (ref 0–250)
LYMPHOCYTES # BLD AUTO: 0.6 10E3/UL (ref 0.8–5.3)
LYMPHOCYTES NFR BLD AUTO: 9 %
MAGNESIUM SERPL-MCNC: 1.8 MG/DL (ref 1.7–2.3)
MCH RBC QN AUTO: 30.4 PG (ref 26.5–33)
MCH RBC QN AUTO: 30.5 PG (ref 26.5–33)
MCHC RBC AUTO-ENTMCNC: 33.7 G/DL (ref 31.5–36.5)
MCHC RBC AUTO-ENTMCNC: 34.2 G/DL (ref 31.5–36.5)
MCV RBC AUTO: 89 FL (ref 78–100)
MCV RBC AUTO: 91 FL (ref 78–100)
MONOCYTES # BLD AUTO: 0.8 10E3/UL (ref 0–1.3)
MONOCYTES NFR BLD AUTO: 11 %
NEUTROPHILS # BLD AUTO: 5.4 10E3/UL (ref 1.6–8.3)
NEUTROPHILS NFR BLD AUTO: 77 %
NRBC # BLD AUTO: 0 10E3/UL
NRBC BLD AUTO-RTO: 0 /100
P AXIS - MUSE: NORMAL DEGREES
PLATELET # BLD AUTO: 191 10E3/UL (ref 150–450)
PLATELET # BLD AUTO: 204 10E3/UL (ref 150–450)
POTASSIUM SERPL-SCNC: 4.1 MMOL/L (ref 3.4–5.3)
POTASSIUM SERPL-SCNC: 4.2 MMOL/L (ref 3.4–5.3)
PR INTERVAL - MUSE: 264 MS
PROT SERPL-MCNC: 7.1 G/DL (ref 6.4–8.3)
PROT SERPL-MCNC: 7.3 G/DL (ref 6.4–8.3)
QRS DURATION - MUSE: 120 MS
QT - MUSE: 448 MS
QTC - MUSE: 483 MS
R AXIS - MUSE: 208 DEGREES
RBC # BLD AUTO: 3.13 10E6/UL (ref 4.4–5.9)
RBC # BLD AUTO: 3.38 10E6/UL (ref 4.4–5.9)
SODIUM SERPL-SCNC: 132 MMOL/L (ref 135–145)
SODIUM SERPL-SCNC: 135 MMOL/L (ref 135–145)
SPECIMEN EXP DATE BLD: NORMAL
SPECIMEN EXP DATE BLD: NORMAL
SYSTOLIC BLOOD PRESSURE - MUSE: NORMAL MMHG
T AXIS - MUSE: 84 DEGREES
TROPONIN T SERPL HS-MCNC: 14 NG/L
TROPONIN T SERPL HS-MCNC: 14 NG/L
VENTRICULAR RATE- MUSE: 70 BPM
WBC # BLD AUTO: 6.7 10E3/UL (ref 4–11)
WBC # BLD AUTO: 7 10E3/UL (ref 4–11)

## 2025-02-04 PROCEDURE — 250N000009 HC RX 250: Performed by: NURSE PRACTITIONER

## 2025-02-04 PROCEDURE — 84460 ALANINE AMINO (ALT) (SGPT): CPT

## 2025-02-04 PROCEDURE — 96374 THER/PROPH/DIAG INJ IV PUSH: CPT

## 2025-02-04 PROCEDURE — G0378 HOSPITAL OBSERVATION PER HR: HCPCS

## 2025-02-04 PROCEDURE — 85610 PROTHROMBIN TIME: CPT | Performed by: STUDENT IN AN ORGANIZED HEALTH CARE EDUCATION/TRAINING PROGRAM

## 2025-02-04 PROCEDURE — 93010 ELECTROCARDIOGRAM REPORT: CPT | Performed by: STUDENT IN AN ORGANIZED HEALTH CARE EDUCATION/TRAINING PROGRAM

## 2025-02-04 PROCEDURE — 84155 ASSAY OF PROTEIN SERUM: CPT

## 2025-02-04 PROCEDURE — 82247 BILIRUBIN TOTAL: CPT

## 2025-02-04 PROCEDURE — 83615 LACTATE (LD) (LDH) ENZYME: CPT

## 2025-02-04 PROCEDURE — 99223 1ST HOSP IP/OBS HIGH 75: CPT | Mod: FS | Performed by: HOSPITALIST

## 2025-02-04 PROCEDURE — 99285 EMERGENCY DEPT VISIT HI MDM: CPT | Mod: 25 | Performed by: STUDENT IN AN ORGANIZED HEALTH CARE EDUCATION/TRAINING PROGRAM

## 2025-02-04 PROCEDURE — 99285 EMERGENCY DEPT VISIT HI MDM: CPT | Performed by: STUDENT IN AN ORGANIZED HEALTH CARE EDUCATION/TRAINING PROGRAM

## 2025-02-04 PROCEDURE — 85027 COMPLETE CBC AUTOMATED: CPT | Mod: 59

## 2025-02-04 PROCEDURE — 83735 ASSAY OF MAGNESIUM: CPT | Performed by: STUDENT IN AN ORGANIZED HEALTH CARE EDUCATION/TRAINING PROGRAM

## 2025-02-04 PROCEDURE — 99207 PR APP CREDIT; MD BILLING SHARED VISIT: CPT | Mod: FS | Performed by: NURSE PRACTITIONER

## 2025-02-04 PROCEDURE — 82565 ASSAY OF CREATININE: CPT | Performed by: STUDENT IN AN ORGANIZED HEALTH CARE EDUCATION/TRAINING PROGRAM

## 2025-02-04 PROCEDURE — 85025 COMPLETE CBC W/AUTO DIFF WBC: CPT

## 2025-02-04 PROCEDURE — 84450 TRANSFERASE (AST) (SGOT): CPT

## 2025-02-04 PROCEDURE — 82728 ASSAY OF FERRITIN: CPT

## 2025-02-04 PROCEDURE — 84484 ASSAY OF TROPONIN QUANT: CPT | Performed by: STUDENT IN AN ORGANIZED HEALTH CARE EDUCATION/TRAINING PROGRAM

## 2025-02-04 PROCEDURE — 250N000013 HC RX MED GY IP 250 OP 250 PS 637: Performed by: NURSE PRACTITIONER

## 2025-02-04 PROCEDURE — 36415 COLL VENOUS BLD VENIPUNCTURE: CPT | Performed by: STUDENT IN AN ORGANIZED HEALTH CARE EDUCATION/TRAINING PROGRAM

## 2025-02-04 PROCEDURE — 93005 ELECTROCARDIOGRAM TRACING: CPT | Performed by: STUDENT IN AN ORGANIZED HEALTH CARE EDUCATION/TRAINING PROGRAM

## 2025-02-04 PROCEDURE — 85730 THROMBOPLASTIN TIME PARTIAL: CPT | Performed by: STUDENT IN AN ORGANIZED HEALTH CARE EDUCATION/TRAINING PROGRAM

## 2025-02-04 PROCEDURE — 85610 PROTHROMBIN TIME: CPT

## 2025-02-04 PROCEDURE — 82607 VITAMIN B-12: CPT

## 2025-02-04 PROCEDURE — 86901 BLOOD TYPING SEROLOGIC RH(D): CPT | Performed by: STUDENT IN AN ORGANIZED HEALTH CARE EDUCATION/TRAINING PROGRAM

## 2025-02-04 PROCEDURE — 86850 RBC ANTIBODY SCREEN: CPT | Performed by: STUDENT IN AN ORGANIZED HEALTH CARE EDUCATION/TRAINING PROGRAM

## 2025-02-04 PROCEDURE — 80048 BASIC METABOLIC PNL TOTAL CA: CPT

## 2025-02-04 PROCEDURE — 83540 ASSAY OF IRON: CPT | Performed by: NURSE PRACTITIONER

## 2025-02-04 PROCEDURE — 36415 COLL VENOUS BLD VENIPUNCTURE: CPT

## 2025-02-04 RX ORDER — HYDRALAZINE HYDROCHLORIDE 25 MG/1
25 TABLET, FILM COATED ORAL 2 TIMES DAILY
Status: DISCONTINUED | OUTPATIENT
Start: 2025-02-04 | End: 2025-02-07 | Stop reason: HOSPADM

## 2025-02-04 RX ORDER — ACETAMINOPHEN 325 MG/1
650 TABLET ORAL EVERY 4 HOURS PRN
Status: DISCONTINUED | OUTPATIENT
Start: 2025-02-04 | End: 2025-02-07 | Stop reason: HOSPADM

## 2025-02-04 RX ORDER — AMOXICILLIN 250 MG
2 CAPSULE ORAL 2 TIMES DAILY PRN
Status: DISCONTINUED | OUTPATIENT
Start: 2025-02-04 | End: 2025-02-07 | Stop reason: HOSPADM

## 2025-02-04 RX ORDER — ESCITALOPRAM OXALATE 10 MG/1
10 TABLET ORAL DAILY
Status: DISCONTINUED | OUTPATIENT
Start: 2025-02-05 | End: 2025-02-07 | Stop reason: HOSPADM

## 2025-02-04 RX ORDER — AMLODIPINE BESYLATE 5 MG/1
5 TABLET ORAL DAILY
Status: DISCONTINUED | OUTPATIENT
Start: 2025-02-05 | End: 2025-02-07 | Stop reason: HOSPADM

## 2025-02-04 RX ORDER — PROCHLORPERAZINE MALEATE 5 MG/1
5 TABLET ORAL EVERY 6 HOURS PRN
Status: DISCONTINUED | OUTPATIENT
Start: 2025-02-04 | End: 2025-02-07 | Stop reason: HOSPADM

## 2025-02-04 RX ORDER — ACETAMINOPHEN 650 MG/1
650 SUPPOSITORY RECTAL EVERY 4 HOURS PRN
Status: DISCONTINUED | OUTPATIENT
Start: 2025-02-04 | End: 2025-02-07 | Stop reason: HOSPADM

## 2025-02-04 RX ORDER — AMOXICILLIN 250 MG
1 CAPSULE ORAL 2 TIMES DAILY PRN
Status: DISCONTINUED | OUTPATIENT
Start: 2025-02-04 | End: 2025-02-07 | Stop reason: HOSPADM

## 2025-02-04 RX ORDER — ATORVASTATIN CALCIUM 80 MG/1
80 TABLET, FILM COATED ORAL EVERY EVENING
Status: DISCONTINUED | OUTPATIENT
Start: 2025-02-04 | End: 2025-02-07 | Stop reason: HOSPADM

## 2025-02-04 RX ORDER — ONDANSETRON 4 MG/1
4 TABLET, ORALLY DISINTEGRATING ORAL EVERY 6 HOURS PRN
Status: DISCONTINUED | OUTPATIENT
Start: 2025-02-04 | End: 2025-02-07 | Stop reason: HOSPADM

## 2025-02-04 RX ORDER — POLYETHYLENE GLYCOL 3350 17 G/17G
17 POWDER, FOR SOLUTION ORAL 2 TIMES DAILY PRN
Status: DISCONTINUED | OUTPATIENT
Start: 2025-02-04 | End: 2025-02-07 | Stop reason: HOSPADM

## 2025-02-04 RX ORDER — MULTIPLE VITAMINS W/ MINERALS TAB 9MG-400MCG
1 TAB ORAL DAILY
Status: DISCONTINUED | OUTPATIENT
Start: 2025-02-05 | End: 2025-02-07 | Stop reason: HOSPADM

## 2025-02-04 RX ORDER — DIGOXIN 0.06 MG/1
62.5 TABLET ORAL DAILY
Status: DISCONTINUED | OUTPATIENT
Start: 2025-02-05 | End: 2025-02-07 | Stop reason: HOSPADM

## 2025-02-04 RX ORDER — AMIODARONE HYDROCHLORIDE 200 MG/1
200 TABLET ORAL DAILY
Status: DISCONTINUED | OUTPATIENT
Start: 2025-02-05 | End: 2025-02-07 | Stop reason: HOSPADM

## 2025-02-04 RX ORDER — MAGNESIUM OXIDE 400 MG/1
400 TABLET ORAL DAILY
Status: DISCONTINUED | OUTPATIENT
Start: 2025-02-05 | End: 2025-02-07 | Stop reason: HOSPADM

## 2025-02-04 RX ORDER — ONDANSETRON 2 MG/ML
4 INJECTION INTRAMUSCULAR; INTRAVENOUS EVERY 6 HOURS PRN
Status: DISCONTINUED | OUTPATIENT
Start: 2025-02-04 | End: 2025-02-07 | Stop reason: HOSPADM

## 2025-02-04 RX ORDER — GABAPENTIN 100 MG/1
100 CAPSULE ORAL AT BEDTIME
Status: DISCONTINUED | OUTPATIENT
Start: 2025-02-04 | End: 2025-02-07 | Stop reason: HOSPADM

## 2025-02-04 RX ADMIN — PANTOPRAZOLE SODIUM 40 MG: 40 INJECTION, POWDER, FOR SOLUTION INTRAVENOUS at 22:00

## 2025-02-04 RX ADMIN — GABAPENTIN 100 MG: 100 CAPSULE ORAL at 21:48

## 2025-02-04 RX ADMIN — ATORVASTATIN CALCIUM 80 MG: 80 TABLET, FILM COATED ORAL at 21:48

## 2025-02-04 ASSESSMENT — ACTIVITIES OF DAILY LIVING (ADL)
ADLS_ACUITY_SCORE: 57

## 2025-02-04 NOTE — ED PROVIDER NOTES
[place] EMERGENCY DEPARTMENT ([facility])    2/04/25       ED PROVIDER NOTE  History     Chief Complaint   Patient presents with    Rectal Bleeding     HPI  Duane C Johnson is a 75 year old male with a past medical history of prostate cancer, CHF with 20-30% EF, atrial fibrillation anticoagulated on warfarin, hypertension, and LVAD, who presents to the ED for evaluation of rectal bleeding.  Patient does not have history of GI bleeding in the past.  Does have a remote history of external hemorrhoids but does not feel like that is the issue today.  Patient says that 1 week ago he noticed bright red blood with bowel movements and farting.  He says that the amount of blood has not changed however he does feel generally weak.  He had blood work done this morning and was told to come to the ER for evaluation by his cardiology team.  He denies any fevers chills lightheadedness syncope chest pain shortness of breath palpitations abdominal pain flank pain genital pain hematuria.  He is compliant with his warfarin with last dose being last night    Past Medical History  Past Medical History:   Diagnosis Date    Benign essential hypertension     CAD (coronary artery disease)     Chronic systolic heart failure (H)     Hypertension     ST elevation myocardial infarction (STEMI), unspecified artery (H)     Ventricular fibrillation (H)      Past Surgical History:   Procedure Laterality Date    ANESTHESIA CARDIOVERSION N/A 9/5/2024    Procedure: Anesthesia cardioversion;  Surgeon: GENERIC ANESTHESIA PROVIDER;  Location: [facility] OR    ANESTHESIA CARDIOVERSION N/A 9/30/2024    Procedure: Anesthesia cardioversion;  Surgeon: GENERIC ANESTHESIA PROVIDER;  Location: [facility] OR    COLONOSCOPY N/A 3/23/2023    Procedure: COLONOSCOPY, FLEXIBLE, WITH LESION REMOVAL USING SNARE;  Surgeon: Jose Faustin MD;  Location: [facility]    CV CENTRAL VENOUS CATHETER PLACEMENT N/A 10/26/2023    Procedure: Central Venous Catheter Placement;   Surgeon: Rob Lyles MD;  Location:  HE[facility] CARDIAC CATH LAB    CV CORONARY ANGIOGRAM N/A 10/27/2023    Procedure: Coronary Angiogram;  Surgeon: Rob Lyles MD;  Location:  HEART CARDIAC CATH LAB    CV CORONARY ANGIOGRAM N/A 10/26/2023    Procedure: Coronary Angiogram;  Surgeon: Rob Lyles MD;  Location:  HEART CARDIAC CATH LAB    CV LEFT HEART CATH N/A 10/26/2023    Procedure: Left Heart Catheterization;  Surgeon: Rob Lyles MD;  Location:  HEART CARDIAC CATH LAB    CV PCI N/A 10/27/2023    Procedure: Percutaneous Coronary Intervention;  Surgeon: Rob Lyles MD;  Location:  HEART CARDIAC CATH LAB    CV PCI STENT DRUG ELUTING N/A 10/26/2023    Procedure: Percutaneous Coronary Intervention Stent;  Surgeon: Rob Lyles MD;  Location:  HEART CARDIAC CATH LAB    CV RIGHT HEART CATH MEASUREMENTS RECORDED N/A 5/6/2024    Procedure: Heart Cath Right Heart Cath;  Surgeon: Rob Lyles MD;  Location:  HEART CARDIAC CATH LAB    CV RIGHT HEART CATH MEASUREMENTS RECORDED N/A 9/3/2024    Procedure: Right Heart Catheterization;  Surgeon: Aaron Mesa MD;  Location:  HEART CARDIAC CATH LAB    CV RIGHT HEART CATH MEASUREMENTS RECORDED N/A 10/24/2024    Procedure: Right Heart Catheterization;  Surgeon: Haile Braden MD;  Location: [facility] CARDIAC CATH LAB    EP ICD INSERT SINGLE N/A 5/8/2024    Procedure: Implantable Cardioverter Defibrillator Device & Lead Implant Dual;  Surgeon: Guero Black MD;  Location: [facility] CARDIAC CATH LAB    INJECTION, HYDROGEL SPACER N/A 4/22/2024    Procedure: INJECTION, HYDROGEL SPACER AND FIDUCIAL MARKER PLACEMENT;  Surgeon: Stanislaw Barros MD;  Location: [facility]    INSERT VENTRICULAR ASSIST DEVICE LEFT (HEARTMATE II) N/A 9/18/2024    Procedure: Median Sternotomy, INSERTION of LEFT VENTRICULAR ASSIST DEVICE (HEARTMATE III), cardiopulmonary bypass, transesophageal  echocardiogram performed by anesthesia.;  Surgeon: Mulvihill, Michael, MD;  Location: [facility] OR    IRRIGATION AND DEBRIDEMENT CHEST WASHOUT, COMBINED Right 2024    Procedure: Exploration of chest, chest washout;  Surgeon: Mulvihill, Michael, MD;  Location: [facility] OR     acetaminophen (TYLENOL) 325 MG tablet  amiodarone (PACERONE) 200 MG tablet  amLODIPine (NORVASC) 10 MG tablet  atorvastatin (LIPITOR) 80 MG tablet  digoxin (LANOXIN) 62.5 MCG tablet  escitalopram (LEXAPRO) 10 MG tablet  gabapentin (NEURONTIN) 100 MG capsule  hydrALAZINE (APRESOLINE) 25 MG tablet  magnesium hydroxide (MILK OF MAGNESIA) 400 MG/5ML suspension  magnesium oxide (MAG-OX) 400 MG tablet  melatonin 10 MG TABS tablet  multivitamin w/minerals (THERA-VIT-M) tablet  warfarin ANTICOAGULANT (COUMADIN) 2.5 MG tablet      Allergies   Allergen Reactions    Brilinta [Ticagrelor] Other (See Comments) and Difficulty breathing     Per pt and spouse, hyperventilation.    Clonazepam Other (See Comments)     Per spouse, acted like a zombie and he was shaky, could barely talk.     Family History  Family History   Problem Relation Age of Onset    Other Cancer Mother     Obesity Mother     GI problems Father     Uterine Cancer Sister      Social History   Social History     Tobacco Use    Smoking status: Former     Current packs/day: 0.00     Average packs/day: 0.3 packs/day for 30.0 years (7.5 ttl pk-yrs)     Types: Cigarettes     Start date: 10/26/1993     Quit date: 10/26/2023     Years since quittin.2     Passive exposure: Past    Smokeless tobacco: Never    Tobacco comments:     Quit 10/26/2023   Vaping Use    Vaping status: Never Used   Substance Use Topics    Alcohol use: Yes     Comment: 1-2 beers per day    Drug use: No      A complete review of systems was performed with pertinent positives and negatives noted in the HPI, and all other systems negative.    Physical Exam   Pulse: 69  Temp: 98.4  F (36.9  C)  Resp: 16  SpO2: 96 %  Physical Exam  Gen:  Awake and alert, interactive and in no acute distress  Head: Atraumatic  CV: RRR, no murmurs  Pulm: CTAB with good aeration, no retractions or accessory muscle use, no wheezing/ronchi/crackles  Abd: Soft, NTND, no organomegaly  Back: No midline ttp, no CVAT  Ext: Full ROM w/o pain, no joint effusions, no edema  Derm: No rash, skin warm and dry  Neuro: Moves all extremities appropriately. Affect, activity, and interaction appropriate  Vasc: Symmetric pulses, capillary refill < 2 sec    ED Course, Procedures, & Data      Procedures            EKG Interpretation:      Interpreted by Diamond Boucher DO  Time reviewed: 1815  Symptoms at time of EKG: Generalized weakness  Rhythm: normal sinus   Rate: Normal  Axis: Right Axis Deviation  Ectopy: none  Conduction: 1st degree AV block and nonspecific intraventricular conduction delay  ST Segments/ T Waves: No acute ischemic changes  Q Waves: none  Comparison to prior: Unchanged    Clinical Impression: no acute changes          Results for orders placed or performed during the hospital encounter of 02/04/25   Comprehensive metabolic panel     Status: Abnormal   Result Value Ref Range    Sodium 132 (L) 135 - 145 mmol/L    Potassium 4.1 3.4 - 5.3 mmol/L    Carbon Dioxide (CO2) 24 22 - 29 mmol/L    Anion Gap 10 7 - 15 mmol/L    Urea Nitrogen 18.5 8.0 - 23.0 mg/dL    Creatinine 0.80 0.67 - 1.17 mg/dL    GFR Estimate >90 >60 mL/min/1.73m2    Calcium 8.6 (L) 8.8 - 10.4 mg/dL    Chloride 98 98 - 107 mmol/L    Glucose 105 (H) 70 - 99 mg/dL    Alkaline Phosphatase 105 40 - 150 U/L    AST 25 0 - 45 U/L    ALT 17 0 - 70 U/L    Protein Total 7.3 6.4 - 8.3 g/dL    Albumin 3.5 3.5 - 5.2 g/dL    Bilirubin Total 0.5 <=1.2 mg/dL   INR     Status: Abnormal   Result Value Ref Range    INR 3.61 (H) 0.85 - 1.15   Partial thromboplastin time     Status: Abnormal   Result Value Ref Range    aPTT 52 (H) 22 - 38 Seconds   CBC with platelets and differential     Status: Abnormal   Result Value Ref  Range    WBC Count 7.0 4.0 - 11.0 10e3/uL    RBC Count 3.13 (L) 4.40 - 5.90 10e6/uL    Hemoglobin 9.5 (L) 13.3 - 17.7 g/dL    Hematocrit 27.8 (L) 40.0 - 53.0 %    MCV 89 78 - 100 fL    MCH 30.4 26.5 - 33.0 pg    MCHC 34.2 31.5 - 36.5 g/dL    RDW 15.9 (H) 10.0 - 15.0 %    Platelet Count 191 150 - 450 10e3/uL    % Neutrophils 77 %    % Lymphocytes 9 %    % Monocytes 11 %    % Eosinophils 2 %    % Basophils 0 %    % Immature Granulocytes 0 %    NRBCs per 100 WBC 0 <1 /100    Absolute Neutrophils 5.4 1.6 - 8.3 10e3/uL    Absolute Lymphocytes 0.6 (L) 0.8 - 5.3 10e3/uL    Absolute Monocytes 0.8 0.0 - 1.3 10e3/uL    Absolute Eosinophils 0.2 0.0 - 0.7 10e3/uL    Absolute Basophils 0.0 0.0 - 0.2 10e3/uL    Absolute Immature Granulocytes 0.0 <=0.4 10e3/uL    Absolute NRBCs 0.0 10e3/uL   Troponin T, High Sensitivity     Status: Normal   Result Value Ref Range    Troponin T, High Sensitivity 14 <=22 ng/L   Magnesium     Status: Normal   Result Value Ref Range    Magnesium 1.8 1.7 - 2.3 mg/dL   EKG 12-lead, tracing only     Status: None (Preliminary result)   Result Value Ref Range    Systolic Blood Pressure  mmHg    Diastolic Blood Pressure  mmHg    Ventricular Rate 70 BPM    Atrial Rate 70 BPM    LA Interval 264 ms    QRS Duration 120 ms     ms    QTc 483 ms    P Axis  degrees    R AXIS 208 degrees    T Axis 84 degrees    Interpretation ECG       Sinus rhythm with 1st degree A-V block  Right superior axis deviation  Low voltage QRS  Non-specific intra-ventricular conduction delay  Nonspecific ST and T wave abnormality  Abnormal ECG     Adult Type and Screen     Status: None (Preliminary result)   Result Value Ref Range    ABO/RH(D) O NEG     SPECIMEN EXPIRATION DATE [id number]    CBC with platelets differential     Status: Abnormal    Narrative    The following orders were created for panel order CBC with platelets differential.  Procedure                               Abnormality         Status                      ---------                               -----------         ------                     CBC with platelets and d...[[id number]]  Abnormal            Final result                 Please view results for these tests on the individual orders.   ABO/Rh type and screen     Status: None (In process)    Narrative    The following orders were created for panel order ABO/Rh type and screen.  Procedure                               Abnormality         Status                     ---------                               -----------         ------                     Adult Type and Screen[[id number]]                            Preliminary result           Please view results for these tests on the individual orders.   Results for orders placed or performed in visit on 02/04/25   CBC with platelets     Status: Abnormal   Result Value Ref Range    WBC Count 6.7 4.0 - 11.0 10e3/uL    RBC Count 3.38 (L) 4.40 - 5.90 10e6/uL    Hemoglobin 10.3 (L) 13.3 - 17.7 g/dL    Hematocrit 30.6 (L) 40.0 - 53.0 %    MCV 91 78 - 100 fL    MCH 30.5 26.5 - 33.0 pg    MCHC 33.7 31.5 - 36.5 g/dL    RDW 15.4 (H) 10.0 - 15.0 %    Platelet Count 204 150 - 450 10e3/uL     Medications - No data to display  Labs Ordered and Resulted from Time of ED Arrival to Time of ED Departure   COMPREHENSIVE METABOLIC PANEL - Abnormal       Result Value    Sodium 132 (*)     Potassium 4.1      Carbon Dioxide (CO2) 24      Anion Gap 10      Urea Nitrogen 18.5      Creatinine 0.80      GFR Estimate >90      Calcium 8.6 (*)     Chloride 98      Glucose 105 (*)     Alkaline Phosphatase 105      AST 25      ALT 17      Protein Total 7.3      Albumin 3.5      Bilirubin Total 0.5     INR - Abnormal    INR 3.61 (*)    PARTIAL THROMBOPLASTIN TIME - Abnormal    aPTT 52 (*)    CBC WITH PLATELETS AND DIFFERENTIAL - Abnormal    WBC Count 7.0      RBC Count 3.13 (*)     Hemoglobin 9.5 (*)     Hematocrit 27.8 (*)     MCV 89      MCH 30.4      MCHC 34.2      RDW 15.9 (*)     Platelet  Count 191      % Neutrophils 77      % Lymphocytes 9      % Monocytes 11      % Eosinophils 2      % Basophils 0      % Immature Granulocytes 0      NRBCs per 100 WBC 0      Absolute Neutrophils 5.4      Absolute Lymphocytes 0.6 (*)     Absolute Monocytes 0.8      Absolute Eosinophils 0.2      Absolute Basophils 0.0      Absolute Immature Granulocytes 0.0      Absolute NRBCs 0.0     TROPONIN T, HIGH SENSITIVITY - Normal    Troponin T, High Sensitivity 14     MAGNESIUM - Normal    Magnesium 1.8     TROPONIN T, HIGH SENSITIVITY   TYPE AND SCREEN, ADULT    ABO/RH(D) O NEG      SPECIMEN EXPIRATION DATE [id number]     ABO/RH TYPE AND SCREEN     No orders to display          Critical care was not performed.     Medical Decision Making  The patient's presentation was of high complexity (an acute health issue posing potential threat to life or bodily function).    The patient's evaluation involved:  review of external note(s) from 1 sources (see separate area of note for details)  review of 1 test result(s) ordered prior to this encounter (see separate area of note for details)  ordering and/or review of 3+ test(s) in this encounter (see separate area of note for details)  discussion of management or test interpretation with another health professional (see separate area of note for details)    The patient's management necessitated high risk (a decision regarding hospitalization).    Assessment & Plan    Vital signs stable.  Based on initial examination I do not have any concern for CVA or occult infectious process cause of his generalized weakness.  Patient does not have any hemorrhoids or fissures on rectal exam however is Hemoccult positive.  No gross blood.    Hemoglobin was 10.3 at 2 PM today and repeat 4 hours later is 9.5.  This is a significant drop even if there is slight lab error.  EKG nonischemic and other labs unremarkable.  No concerns for ACS or exacerbation of heart failure or LVAD dysfunction as cause  of generalized weakness.    Spoke with GI fellow on-call who states that he is not very concerned about patient's presentation.  He states that he is comfortable the patient following up in GI clinic outpatient in 1 to 2 weeks.  I completely disagree with this.  I forgot and anticoagulated supratherapeutic patient who has an active GI bleed with a hemoglobin drop just in several hours.  I do believe that he at least warrants observation.  I spoke with cards to on-call provider who does not have any recommendations at this point.    Patient admitted for generalized weakness secondary to GI bleed and anemia    I have reviewed the nursing notes. I have reviewed the findings, diagnosis, plan and need for follow up with the patient.    New Prescriptions    No medications on file       Final diagnoses:   Generalized weakness   Anemia, unspecified type   Lower GI bleeding   Supratherapeutic INR       Diamond EPPS [facility] EMERGENCY DEPARTMENT  2/4/2025     Diamond Boucher DO  02/04/25 2010

## 2025-02-04 NOTE — PROGRESS NOTES
D: Called patient to check in after clinic apt yesterday and reports of GI bleed.     I/A:   - not feeling any better, weak   - had to sit on floor yesterday d/t dizziness yesterday.   - red stools ongoing.    No changes to vad parameters   Map today 88/90  CBC is stable.     P: Discsused with Dr. Cruz who would like patient to come to [facility] ED for evaluation, patient and wife aware and agreeable. Updated Dr. Asencio who is on call. Updated Erika Duffy RN at the [facility].  Patient, Family notified to page on-call coordinator if symptoms worsen or with other concerns. Patient, Family verbalized understanding.

## 2025-02-04 NOTE — ED TRIAGE NOTES
Pt ambulatory to ED with c/o bright red rectal bleeding. Pt states symptoms started approx. 1 week ago and has progressively gotten worse. PT endorse dizziness and fatigue. Pt is on warfarin. Pt denies SOB, chest pain, fever or chills.     Pulse 69   Temp 98.4  F (36.9  C) (Oral)   Resp 16   SpO2 96%     Hx: LVAD (On warfarin) , CHF, VT/VF arrest, STEMI,      Triage Assessment (Adult)       Row Name 02/04/25 2346          Triage Assessment    Airway WDL WDL        Respiratory WDL    Respiratory WDL WDL        Skin Circulation/Temperature WDL    Skin Circulation/Temperature WDL WDL        Cardiac WDL    Cardiac WDL WDL        Peripheral/Neurovascular WDL    Peripheral Neurovascular WDL WDL        Cognitive/Neuro/Behavioral WDL    Cognitive/Neuro/Behavioral WDL WDL

## 2025-02-05 PROBLEM — Z79.01 LONG TERM (CURRENT) USE OF ANTICOAGULANTS: Status: ACTIVE | Noted: 2025-02-05

## 2025-02-05 PROBLEM — K92.1 HEMATOCHEZIA: Status: ACTIVE | Noted: 2025-02-05

## 2025-02-05 LAB
BASOPHILS # BLD AUTO: 0.1 10E3/UL (ref 0–0.2)
BASOPHILS NFR BLD AUTO: 1 %
EOSINOPHIL # BLD AUTO: 0.2 10E3/UL (ref 0–0.7)
EOSINOPHIL NFR BLD AUTO: 2 %
ERYTHROCYTE [DISTWIDTH] IN BLOOD BY AUTOMATED COUNT: 15.7 % (ref 10–15)
ERYTHROCYTE [DISTWIDTH] IN BLOOD BY AUTOMATED COUNT: 15.9 % (ref 10–15)
FERRITIN SERPL-MCNC: 279 NG/ML (ref 31–409)
FLEXIBLE SIGMOIDOSCOPY: NORMAL
FOLATE SERPL-MCNC: 18.4 NG/ML (ref 4.6–34.8)
HAPTOGLOB SERPL-MCNC: 272 MG/DL (ref 30–200)
HCT VFR BLD AUTO: 27.4 % (ref 40–53)
HCT VFR BLD AUTO: 28.5 % (ref 40–53)
HGB BLD-MCNC: 9.4 G/DL (ref 13.3–17.7)
HGB BLD-MCNC: 9.6 G/DL (ref 13.3–17.7)
HGB BLD-MCNC: 9.6 G/DL (ref 13.3–17.7)
HGB BLD-MCNC: 9.7 G/DL (ref 13.3–17.7)
IMM GRANULOCYTES # BLD: 0.1 10E3/UL
IMM GRANULOCYTES NFR BLD: 1 %
INR PPP: 3.92 (ref 0.85–1.15)
LDH SERPL L TO P-CCNC: 214 U/L (ref 0–250)
LYMPHOCYTES # BLD AUTO: 0.7 10E3/UL (ref 0.8–5.3)
LYMPHOCYTES NFR BLD AUTO: 10 %
MCH RBC QN AUTO: 29.7 PG (ref 26.5–33)
MCH RBC QN AUTO: 31 PG (ref 26.5–33)
MCHC RBC AUTO-ENTMCNC: 33 G/DL (ref 31.5–36.5)
MCHC RBC AUTO-ENTMCNC: 35 G/DL (ref 31.5–36.5)
MCV RBC AUTO: 88 FL (ref 78–100)
MCV RBC AUTO: 90 FL (ref 78–100)
MONOCYTES # BLD AUTO: 0.7 10E3/UL (ref 0–1.3)
MONOCYTES NFR BLD AUTO: 10 %
NEUTROPHILS # BLD AUTO: 5.3 10E3/UL (ref 1.6–8.3)
NEUTROPHILS NFR BLD AUTO: 76 %
NRBC # BLD AUTO: 0 10E3/UL
NRBC BLD AUTO-RTO: 0 /100
PLATELET # BLD AUTO: 181 10E3/UL (ref 150–450)
PLATELET # BLD AUTO: 185 10E3/UL (ref 150–450)
RBC # BLD AUTO: 3.1 10E6/UL (ref 4.4–5.9)
RBC # BLD AUTO: 3.17 10E6/UL (ref 4.4–5.9)
RETICS # AUTO: 0.08 10E6/UL (ref 0.03–0.1)
RETICS # AUTO: 0.09 10E6/UL (ref 0.03–0.1)
RETICS/RBC NFR AUTO: 2.7 % (ref 0.5–2)
RETICS/RBC NFR AUTO: 2.7 % (ref 0.5–2)
VIT B12 SERPL-MCNC: 522 PG/ML (ref 232–1245)
WBC # BLD AUTO: 6.1 10E3/UL (ref 4–11)
WBC # BLD AUTO: 7 10E3/UL (ref 4–11)

## 2025-02-05 PROCEDURE — 250N000013 HC RX MED GY IP 250 OP 250 PS 637

## 2025-02-05 PROCEDURE — 250N000013 HC RX MED GY IP 250 OP 250 PS 637: Performed by: NURSE PRACTITIONER

## 2025-02-05 PROCEDURE — 93750 INTERROGATION VAD IN PERSON: CPT

## 2025-02-05 PROCEDURE — 99207 PR APP CREDIT; MD BILLING SHARED VISIT: CPT | Mod: FS | Performed by: STUDENT IN AN ORGANIZED HEALTH CARE EDUCATION/TRAINING PROGRAM

## 2025-02-05 PROCEDURE — 85018 HEMOGLOBIN: CPT

## 2025-02-05 PROCEDURE — 99223 1ST HOSP IP/OBS HIGH 75: CPT | Mod: 25

## 2025-02-05 PROCEDURE — 85610 PROTHROMBIN TIME: CPT | Performed by: NURSE PRACTITIONER

## 2025-02-05 PROCEDURE — 83010 ASSAY OF HAPTOGLOBIN QUANT: CPT

## 2025-02-05 PROCEDURE — 85049 AUTOMATED PLATELET COUNT: CPT | Performed by: NURSE PRACTITIONER

## 2025-02-05 PROCEDURE — 120N000005 HC R&B MS OVERFLOW UMMC

## 2025-02-05 PROCEDURE — 250N000009 HC RX 250: Performed by: NURSE PRACTITIONER

## 2025-02-05 PROCEDURE — 82746 ASSAY OF FOLIC ACID SERUM: CPT

## 2025-02-05 PROCEDURE — 258N000003 HC RX IP 258 OP 636

## 2025-02-05 PROCEDURE — 96361 HYDRATE IV INFUSION ADD-ON: CPT

## 2025-02-05 PROCEDURE — G0378 HOSPITAL OBSERVATION PER HR: HCPCS

## 2025-02-05 PROCEDURE — 85060 BLOOD SMEAR INTERPRETATION: CPT | Performed by: STUDENT IN AN ORGANIZED HEALTH CARE EDUCATION/TRAINING PROGRAM

## 2025-02-05 PROCEDURE — 96376 TX/PRO/DX INJ SAME DRUG ADON: CPT

## 2025-02-05 PROCEDURE — 36415 COLL VENOUS BLD VENIPUNCTURE: CPT

## 2025-02-05 PROCEDURE — 85025 COMPLETE CBC W/AUTO DIFF WBC: CPT | Performed by: NURSE PRACTITIONER

## 2025-02-05 PROCEDURE — 45330 DIAGNOSTIC SIGMOIDOSCOPY: CPT | Performed by: INTERNAL MEDICINE

## 2025-02-05 PROCEDURE — 0DJD8ZZ INSPECTION OF LOWER INTESTINAL TRACT, VIA NATURAL OR ARTIFICIAL OPENING ENDOSCOPIC: ICD-10-PCS | Performed by: INTERNAL MEDICINE

## 2025-02-05 PROCEDURE — 99418 PROLNG IP/OBS E/M EA 15 MIN: CPT

## 2025-02-05 PROCEDURE — 36415 COLL VENOUS BLD VENIPUNCTURE: CPT | Performed by: NURSE PRACTITIONER

## 2025-02-05 PROCEDURE — 99233 SBSQ HOSP IP/OBS HIGH 50: CPT | Mod: FS

## 2025-02-05 PROCEDURE — 85018 HEMOGLOBIN: CPT | Performed by: NURSE PRACTITIONER

## 2025-02-05 PROCEDURE — 85045 AUTOMATED RETICULOCYTE COUNT: CPT

## 2025-02-05 RX ORDER — SUCRALFATE ORAL 1 G/10ML
2 SUSPENSION ORAL
Status: DISCONTINUED | OUTPATIENT
Start: 2025-02-05 | End: 2025-02-07 | Stop reason: HOSPADM

## 2025-02-05 RX ADMIN — AMIODARONE HYDROCHLORIDE 200 MG: 200 TABLET ORAL at 08:21

## 2025-02-05 RX ADMIN — DIGOXIN 62.5 MCG: 0.06 TABLET ORAL at 08:20

## 2025-02-05 RX ADMIN — ATORVASTATIN CALCIUM 80 MG: 80 TABLET, FILM COATED ORAL at 20:44

## 2025-02-05 RX ADMIN — GABAPENTIN 100 MG: 100 CAPSULE ORAL at 21:51

## 2025-02-05 RX ADMIN — Medication 1 TABLET: at 08:21

## 2025-02-05 RX ADMIN — SUCRALFATE 2 G: 1 SUSPENSION ORAL at 14:12

## 2025-02-05 RX ADMIN — SODIUM CHLORIDE, POTASSIUM CHLORIDE, SODIUM LACTATE AND CALCIUM CHLORIDE 1000 ML: 600; 310; 30; 20 INJECTION, SOLUTION INTRAVENOUS at 10:01

## 2025-02-05 RX ADMIN — ESCITALOPRAM OXALATE 10 MG: 10 TABLET ORAL at 08:21

## 2025-02-05 RX ADMIN — PANTOPRAZOLE SODIUM 40 MG: 40 INJECTION, POWDER, FOR SOLUTION INTRAVENOUS at 20:44

## 2025-02-05 RX ADMIN — MAGNESIUM OXIDE TAB 400 MG (241.3 MG ELEMENTAL MG) 400 MG: 400 (241.3 MG) TAB at 08:21

## 2025-02-05 RX ADMIN — PANTOPRAZOLE SODIUM 40 MG: 40 INJECTION, POWDER, FOR SOLUTION INTRAVENOUS at 08:21

## 2025-02-05 ASSESSMENT — ACTIVITIES OF DAILY LIVING (ADL)
ADLS_ACUITY_SCORE: 57
ADLS_ACUITY_SCORE: 59
ADLS_ACUITY_SCORE: 57
ADLS_ACUITY_SCORE: 59
ADLS_ACUITY_SCORE: 57
ADLS_ACUITY_SCORE: 59
ADLS_ACUITY_SCORE: 59
ADLS_ACUITY_SCORE: 57
ADLS_ACUITY_SCORE: 59
ADLS_ACUITY_SCORE: 59
ADLS_ACUITY_SCORE: 57
ADLS_ACUITY_SCORE: 59
ADLS_ACUITY_SCORE: 59
ADLS_ACUITY_SCORE: 57
ADLS_ACUITY_SCORE: 59
ADLS_ACUITY_SCORE: 57
ADLS_ACUITY_SCORE: 57

## 2025-02-05 NOTE — PHARMACY-ANTICOAGULATION SERVICE
Clinical Pharmacy - Warfarin Dosing Consult     Pharmacy has been consulted to manage this patient s warfarin therapy.  Indication: LVAD/RVAD  Therapy Goal: INR 2-3  Provider/Team: Prasanth [facility]: [facility]  Warfarin Prior to Admission: Yes  Warfarin PTA Regimen: Per 1/31/25 [facility] clinic note: 2.5 mg on Sun, Tue, and Friday, and 1.25 mg all other days  Recent documented change in oral intake/nutrition: Unknown  Dose Comments: Per ED provider note, patient last took warfarin evening of 2/3    INR   Date Value Ref Range Status   02/04/2025 3.61 (H) 0.85 - 1.15 Final   01/31/2025 2.55 (H) 0.85 - 1.15 Final       Recommend not giving a dose of warfarin tonight given supratherapeutic INR and concern for GI bleed.  Pharmacy will monitor Duane C Mingo daily and order warfarin doses to achieve specified goal.      Please contact pharmacy as soon as possible if the warfarin needs to be held for a procedure or if the warfarin goals change.     Ruby Davenport [facility]       Pt is medically optimized for surgery tomorrow, 12/9/23, with Dr. Barcenas for right parietal craniotomy for tumor resection.  -pre-op for surgery tomorrow  -NPO at midnight prior to day of surgery  -hold SQL evening before surgery

## 2025-02-05 NOTE — CONSULTS
Cardiology Inpatient Consultation  Date of Service: 02/05/2025  Patient: Duane C Johnson  MRN: [id number]  Admission Date: 2/4/2025  Hospital Day: 0              ASSESSMENT/PLAN   1. End-stage cardiomyopathy s/p HeartMate 3 LVAD 9/18/2024 on warfarin  2. Radiation proctitis   3. Obstructive coronary artery disease s/p PCI w/ BRENDA LAD 10/26/23  4. Hx VT/VF arrest s/p ICD   5. AF/AFL   6. Hx prostate adenocarcinoma s/p fiducial and SpaceOAR treated with Lupron injection and pelvic radiation therapy (20224)    Duane is doing well since his flexible sigmoidoscopy with identified etiology of hematochezia 2/2 radiation proctitis. He is euvolemic on exam with MAP's 80-90 this AM. His VAD parameters are optimized at speed 5000 RPM achieving a Flow of 3.0L, PI's 4.0-7.0. INR remains supra therapeutic at 3.9.    Per GI no contraindications to restarting AC. Holding warfarin tonight. Continuing with goal INR 2.0-3.0. No changes to chronic medical regimen or LVAD parameters.     RECOMMENDATIONS  Pharmacy to dose warfarin for INR goal 2-3 (holding tonight)   Aggressive hydration + salt intake plans as prior (patient aware)   Restart hydralazine and amlodipine when able   Amiodarone 200 mg daily for hx VT, AF  Digoxin 62.5 mcg daily     Plan of care discussed with Dr. Anderson, who agrees with above plan.    Thank you for consulting the cardiovascular services at the [facility]. Please do not hesitate to call us with any questions.     Neil Dunn MD  Cardiology Fellow  Pager: [phone]              HISTORY OF PRESENT ILLNESS   74 year old male with medical history pertinent for end-stage cardiomyopathy s/p HeartMate 3 LVAD 9/18/2024 who presents with presyncope and hematochezia. He also has history of coronary artery disease, previous STEMI, history of VT VF arrest, status post placement of ICD, A-fib a flutter with previous cardioversion, diverticulosis of the sigmoid and descending colon,  localized prostate adenocarcinoma s/p fiducial and SpaceOAR 4/22/2024 treated with Lupron injection and definitive concurrent pelvic radiation therapy (5/14/2024-6/27/2024).    Duane reports 2-3 days of bright red blood per rectum. Associated symptoms of acute on chronic dizziness, LH, but no LOC. He has had no low flow alarms on LVAD. He arrived to ED hemodynamically stable and without clinical signs of decompensated heart failure. Hemoglobin level of 9.5 (baseline 10.5-11), INR supra therapeutic at 3.3-3.9, no other significant metabolic derangements.    He underwent flexible sigmoidoscopy this AM which showed moderate rectal eyrhtema with stigmata of recent oozing bleeding consistent with radiation proctitis. GI planning for sucralfate enemas BID for 4x weeks.     At the time of interview, the patient denies chest pain, dyspnea at rest or with exertion, orthopnea, PND, palpitations, lightheadedness, or syncope.     Review of Systems:    Complete review of systems was performed and negative except per HPI.            PAST MEDICAL HISTORY      Past Medical History:   Diagnosis Date    Benign essential hypertension     CAD (coronary artery disease)     Chronic systolic heart failure (H)     Hypertension     ST elevation myocardial infarction (STEMI), unspecified artery (H)     Ventricular fibrillation (H)      Active Problems:  Patient Active Problem List    Diagnosis Date Noted    Lower GI bleeding 02/04/2025     Priority: Medium    Generalized weakness 02/04/2025     Priority: Medium    Supratherapeutic INR 02/04/2025     Priority: Medium    Anemia, unspecified type 02/04/2025     Priority: Medium    Chronic combined systolic and diastolic heart failure (H) 11/26/2024     Priority: Medium    Other specified hypotension 10/24/2024     Priority: Medium    Physical deconditioning 10/17/2024     Priority: Medium    History of left ventricular assist device (LVAD) (H) 10/05/2024     Priority: Medium    Chronic systolic  congestive heart failure (H) 2024     Priority: Medium    Anticoagulated on warfarin 2024     Priority: Medium    Personal history of malignant neoplasm of prostate 2024     Priority: Medium    Pleural effusion 2024     Priority: Medium    History of tobacco use 2024     Priority: Medium    AICD (automatic cardioverter/defibrillator) present 2024     Priority: Medium    Acute exacerbation of CHF (congestive heart failure) (H) 2024     Priority: Medium    Atrial fibrillation, unspecified type (H) 2024     Priority: Medium    LVAD (left ventricular assist device) present (H) 2024     Priority: Medium    Acute on chronic systolic congestive heart failure (H) 2024     Priority: Medium    Hypotension 2024     Priority: Medium    Ischemic cardiomyopathy 04/15/2024     Priority: Medium     25% EF      Anticoagulated 04/15/2024     Priority: Medium     Due to large akinetic area anterior heart       Left inguinal hernia 2024     Priority: Medium    Atherosclerosis of native coronary artery of native heart with angina pectoris 2024     Priority: Medium     anterior STEMI s/p PCI to the pLAD on 10/26/23 with a VT/VF arrest the next day (10/27) with patent stents and unchanged anatomy on repeat angiogram       Anxiety 2023     Priority: Medium    Systolic CHF (H) 2023     Priority: Medium    Prostate cancer (H) 2023     Priority: Medium    Hyperlipidemia LDL goal <130 2016     Priority: Medium     Social History:  Social History     Tobacco Use    Smoking status: Former     Current packs/day: 0.00     Average packs/day: 0.3 packs/day for 30.0 years (7.5 ttl pk-yrs)     Types: Cigarettes     Start date: 10/26/1993     Quit date: 10/26/2023     Years since quittin.2     Passive exposure: Past    Smokeless tobacco: Never    Tobacco comments:     Quit 10/26/2023   Vaping Use    Vaping status: Never Used   Substance Use Topics     Alcohol use: Yes     Comment: 1-2 beers per day    Drug use: No     Family History:  Family History   Problem Relation Age of Onset    Other Cancer Mother     Obesity Mother     GI problems Father     Uterine Cancer Sister        Medications:  Current Facility-Administered Medications   Medication Dose Route Frequency Provider Last Rate Last Admin    amiodarone (PACERONE) tablet 200 mg  200 mg Oral Daily Nayely Flores APRN CNP   200 mg at 02/05/25 0821    [Held by provider] amLODIPine (NORVASC) tablet 5 mg  5 mg Oral Daily Nayely Flores APRN CNP        atorvastatin (LIPITOR) tablet 80 mg  80 mg Oral QPM Nayely Flores APRN CNP   80 mg at 02/04/25 2148    digoxin (LANOXIN) tablet 62.5 mcg  62.5 mcg Oral Daily Nayely Flores APRN CNP   62.5 mcg at 02/05/25 0820    escitalopram (LEXAPRO) tablet 10 mg  10 mg Oral or Feeding Tube Daily Nayely Flores APRN CNP   10 mg at 02/05/25 0821    gabapentin (NEURONTIN) capsule 100 mg  100 mg Oral or Feeding Tube At Bedtime Nayely Flores APRN CNP   100 mg at 02/04/25 2148    [Held by provider] hydrALAZINE (APRESOLINE) tablet 25 mg  25 mg Oral BID Nayely Flores APRN CNP        magnesium oxide (MAG-OX) tablet 400 mg  400 mg Oral Daily Nayely Flores APRN CNP   400 mg at 02/05/25 0821    multivitamin w/minerals (THERA-VIT-M) tablet 1 tablet  1 tablet Oral Daily Nayely Flores APRN CNP   1 tablet at 02/05/25 0821    pantoprazole (PROTONIX) IV push injection 40 mg  40 mg Intravenous BID Nayely Flores APRN CNP   40 mg at 02/05/25 0821    sucralfate (CARAFATE) suspension 2 g  2 g Rectal bid 08 & 14 Blanca Johnson PA-C        Warfarin Dose Required Daily - Pharmacist Managed  1 each Oral See Admin Instructions Nayely Flores APRN CNP           Current Facility-Administered Medications   Medication Dose Route Frequency Provider Last Rate Last Admin    Patient is already  receiving anticoagulation with heparin, enoxaparin (LOVENOX), warfarin (COUMADIN)  or other anticoagulant medication   Does not apply Continuous PRN Nayely Flores APRN CNP                 PHYSICAL EXAM     Temp:  [98.4  F (36.9  C)-98.6  F (37  C)] 98.6  F (37  C)  Pulse:  [60-88] 73  Resp:  [12-24] 19  BP: (108)/(91) 108/91  SpO2:  [94 %-100 %] 98 %    Intake/Output Summary (Last 24 hours) at 2/5/2025 1328  Last data filed at 2/5/2025 1100  Gross per 24 hour   Intake 1130 ml   Output 1450 ml   Net -320 ml     GEN: pleasant, no acute distress  HEENT: No discharge  EYES: no icterus  CV: RRR, normal s1/s2, no murmurs/rubs/s3/s4, no heave. .   CHEST: clear to ausculation bilaterally, no rales or wheezing  ABD: soft, non-tender, normal active bowel sounds  : no flank/suprapubic tenderness  EXTR: No clubbing, cyanosis or edema.   NEURO: alert oriented, speech fluent/appropriate, motor grossly nonfocal  PSYCH: cooperative, affect appropriate, pleasant            DIAGNOSTICS     All labs and imaging were reviewed, of note:    CMP  Recent Labs   Lab 02/04/25  1752 02/04/25  1358 01/31/25  1438   * 135 131*   POTASSIUM 4.1 4.2 3.6   CHLORIDE 98 99 96*   CO2 24 27 25   ANIONGAP 10 9 10   * 104* 106*   BUN 18.5 16.0 10.0   CR 0.80 0.86 0.80   GFRESTIMATED >90 90 >90   PRANAV 8.6* 8.9 8.5*   MAG 1.8  --   --    PROTTOTAL 7.3 7.1 6.8   ALBUMIN 3.5 3.4* 3.4*   BILITOTAL 0.5 0.5 0.4   ALKPHOS 105 112 110   AST 25 24 24   ALT 17 19 16     CBC  Recent Labs   Lab 02/05/25  0655 02/05/25  0045 02/04/25  1752 02/04/25  1358 01/31/25  1438   WBC 6.1  --  7.0 6.7 6.6   RBC 3.10*  --  3.13* 3.38* 3.40*   HGB 9.6* 9.7* 9.5* 10.3* 10.2*   HCT 27.4*  --  27.8* 30.6* 30.2*   MCV 88  --  89 91 89   MCH 31.0  --  30.4 30.5 30.0   MCHC 35.0  --  34.2 33.7 33.8   RDW 15.9*  --  15.9* 15.4* 15.3*     --  191 204 188     INR  Recent Labs   Lab 02/05/25  0655 02/04/25  1752 02/04/25  1358 01/31/25  1438   INR 3.92*  "3.61* 3.31* 2.55*     Arterial Blood GasNo lab results found in last 7 days.    No results found for: \"TROPI\", \"TROPONIN\", \"TROPR\", \"TROPN\"       "

## 2025-02-05 NOTE — PHARMACY-ANTICOAGULATION SERVICE
Warfarin Therapy Hold Note  This patient is currently receiving warfarin for LVAD.    Goal INR:  2-3    Anticoagulation Dose History  More data exists         Latest Ref Rng & Units 12/6/2024 12/20/2024 1/6/2025 1/22/2025 1/31/2025 2/4/2025 2/5/2025   Recent Dosing and Labs   INR 0.85 - 1.15 2.61  2.05  2.73  3.77  2.55  3.61  3.31  3.92       Details          Multiple values from one day are sorted in reverse-chronological order                 Bleeding Signs/Symptoms: yes    Assessment:  Current INR is supratherapeutic.  This is most likely due to: drug interactions    Plan:  HOLD today s warfarin dose.   An order has been placed in EPIC for  Warfarin- No Dose Today    Recommend reversal with vitamin K if bleeding continues.  Recommend: 2 mg IV Vitamin K once  Recheck next INR tomorrow with AM labs)    The primary team has been contacted about the above plan/primary team is aware of need to hold dose/team notification not necessary.

## 2025-02-05 NOTE — CONSULTS
"    Gastroenterology Consultation  GI Luminal Service    Date of Admission:  2/4/2025  Reason for Admission: Generalized Weakness, BRBPR, Acute on Chronic Normocytic Anemia, Supratherapeutic INR  Date of Consult  2/5/2025   Requesting Physician:  Cristobal Cowan MD           ASSESSMENT AND RECOMMENDATIONS:   Assessment:  Duane Johnson is a 75 year old male with a history of ischemic cardiomyopathy 2/2 cardiac arrest 2023 status-post LVAD 9/18/2024 on chronic anticoagulation (Warfarin), hyperlipidemia, diverticulosis of the sigmoid and descending colon, localized prostate adenocarcinoma ([place] 4+3 = 7, Grade group 3, unfavorable intermediate risk, not a surgical candidate due to cardiac comorbidities) status-post fiducial and SpaceOAR 4/22/2024 treated with Lupron injection and definitive concurrent pelvic radiation therapy (5/14/2024-6/27/2024), depression, who presented to the ED on 2/4/2025 with reported dizziness, weakness and bright red blood per rectum found to have acute on chronic normocytic anemia and supratherapeutic INR on admission 3.61.     The GI Luminal Service was consulted regarding: \"Hematochezia.\"       # Acute Intermittent Bright Red Blood Per Rectum   # Acute on Chronic Normocytic Anemia  # Prostate Adenocarcinoma status-post Lupron and Pelvic Radiation Therapy 5/14/2024-6/27/2024  # Diverticulosis of the Sigmoid and Descending Colon  # Status-post LVAD on Chronic Warfarin  # Supratherapeutic INR    Patient presents with 1 week history of intermittent bright red blood per rectum (BRBPR) with every-other-day bowel movements and intermittent scant bright red blood with flatus. Denies straining to stool.      Anticoagulation/Antiplatelets: Warfarin in the setting of LVAD.  INR supratherapeutic: currently 3.92 (2/5).   Hemoglobin 9.6 g/dL (2/5).   Baseline Hemoglobin: 9s-10s g/dL.   Currently on Regular Diet.      Last Colonoscopy 3/23/2023 reports diverticulosis in the sigmoid and descending colon " and 4 mm hyperplastic polyp resected.     Differential includes most likely rectal outlet bleeding (such as hemorrhoidal) +/- radiation proctitis (chronic radiation injury) status-post pelvic radiation completed 6/27/2024 (delayed onset); given hemodynamic stability and intermittent nature of the BRBPR with bowel movements, less likely transient non-occlusive ischemic colitis, diverticular bleeding, GI angioectasia(s).     Plan for unsedated diagnostic Flexible sigmoidoscopy this morning 2/5/2025 for further evaluation.       Recommendations:  -- 2 Tap Water Enemas back-to-back starting at 0945 for unsedated Flexible Sigmoidoscopy at 1030 this morning.   -- Diet per primary team.   -- Strict documentation of rectal output.  - Appreciate detailed documentation of stool appearance, including color, consistency, frequency and amount.   - Consider smearing stool thinly on white paper towel to distinguish color.   -- Analgesia/Antiemetics per primary team.  -- If the patient experiences overt GI bleeding with hemodynamic instability, please page the GI Luminal Service (listed on [facility]).       COVID status: not tested this admission.    Discussed with Blanca Johnson PA-C - Medicine Service.   Discussed with Dr. Neil Dunn - Cards 2 Fellow.   Discussed with bedside RN Angelita Clark.     Thank you for involving us in this patient's care. Please do not hesitate to contact the GI service with any questions or concerns.     The patient was discussed and plan agreed upon with Dr. Jp Cartwright, GI Luminal Service staff physician.    Overall time spent on the date of this encounter preparing to see the patient (including chart review of available notes, clinical status events, imaging and labs); discussing, ordering, coordinating recommended medications, tests or procedures; communicating with other health care professionals; and documenting the above clinical information in the electronic medical record was 90 minutes.    Bella  "NAZIA Lozano  Inpatient Gastroenterology Service  [facility]           History of Present Illness:   Patient seen and examined at 0830. History is obtained from patient and chart review.    Duane Johnson is a 75 year old male with a history of ischemic cardiomyopathy 2/2 cardiac arrest 2023 status-post LVAD 9/18/2024 on chronic anticoagulation (Warfarin), hyperlipidemia, diverticulosis of the sigmoid and descending colon, localized prostate adenocarcinoma ([place] 4+3 = 7, Grade group 3, unfavorable intermediate risk, not a surgical candidate due to cardiac comorbidities) status-post fiducial and SpaceOAR 4/22/2024 treated with Lupron injection and definitive concurrent pelvic radiation therapy (5/14/2024-6/27/2024), depression, who presented to the ED on 2/4/2025 with reported dizziness, weakness and bright red blood per rectum found to have acute on chronic normocytic anemia and supratherapeutic INR on admission 3.61.     The GI Luminal Service was consulted regarding: \"Hematochezia.\"     Reports baseline bowel movements every other day without need to strain. One week history of bright red blood on bowel movements that turns the toilet water red as it diffuses across the water if leaves the stool in the toilet water. Ongoing every other day bowel movements. Continues to pass flatus. Sometimes bright red blood with flatus.    Denies nausea, vomiting, acid reflux, heartburn, abdominal pain.     No new abdominal pain.    Reports some abdominal discomfort \"from the LVAD wires.\"     Discussed Flexible sigmoidoscopy with or without sedation. Patient is amenable to without sedation so can keep drinking fluids, noting that if doesn't drink enough fluids, LVAD Low flow alarms occur.        Previous GI Endoscopic Procedures:    3/23/2023 - Colonoscopy - Dr. Jose Faustin  Impression:            - A prostate nodule found on digital rectal exam.                          - Diverticulosis " in the sigmoid colon and in the                          descending colon.                          - One 4 mm polyp at 70 cm proximal to the anus,                          removed with a cold snare. Resected and retrieved.                          - The examination was otherwise normal.   Final Diagnosis   A(1). Colon polyp, 70 cm:  - Hyperplastic polyp         8/19/2008 - [facility] - Dr. Kevin Owens MD   Findings:       A few small-mouthed diverticula were found in the sigmoid colon. The        exam was otherwise normal throughout the examined colon.   Impression:          - Diverticulosis of the sigmoid colon.   Recommendation:      - High fiber diet indefinitely.                        - Repeat colonoscopy in 10 years for screening                        purposes.                        - Return to referring physician as previously                        scheduled.                 Past Medical History:   Reviewed and edited as appropriate  Past Medical History:   Diagnosis Date    Benign essential hypertension     CAD (coronary artery disease)     Chronic systolic heart failure (H)     Hypertension     ST elevation myocardial infarction (STEMI), unspecified artery (H)     Ventricular fibrillation (H)             Past Surgical History:   Reviewed and edited as appropriate   Past Surgical History:   Procedure Laterality Date    ANESTHESIA CARDIOVERSION N/A 9/5/2024    Procedure: Anesthesia cardioversion;  Surgeon: GENERIC ANESTHESIA PROVIDER;  Location: [facility] OR    ANESTHESIA CARDIOVERSION N/A 9/30/2024    Procedure: Anesthesia cardioversion;  Surgeon: GENERIC ANESTHESIA PROVIDER;  Location:  OR    COLONOSCOPY N/A 3/23/2023    Procedure: COLONOSCOPY, FLEXIBLE, WITH LESION REMOVAL USING SNARE;  Surgeon: Jose Faustin MD;  Location: [facility]    CV CENTRAL VENOUS CATHETER PLACEMENT N/A 10/26/2023    Procedure: Central Venous Catheter Placement;  Surgeon: Rob Lyles MD;  Location:  HEART  CARDIAC CATH LAB    CV CORONARY ANGIOGRAM N/A 10/27/2023    Procedure: Coronary Angiogram;  Surgeon: Rob Lyles MD;  Location:  HEART CARDIAC CATH LAB    CV CORONARY ANGIOGRAM N/A 10/26/2023    Procedure: Coronary Angiogram;  Surgeon: Rob Lyles MD;  Location:  HEART CARDIAC CATH LAB    CV LEFT HEART CATH N/A 10/26/2023    Procedure: Left Heart Catheterization;  Surgeon: Rob Lyles MD;  Location: [facility] CARDIAC CATH LAB    CV PCI N/A 10/27/2023    Procedure: Percutaneous Coronary Intervention;  Surgeon: Rob Lyles MD;  Location:  HEART CARDIAC CATH LAB    CV PCI STENT DRUG ELUTING N/A 10/26/2023    Procedure: Percutaneous Coronary Intervention Stent;  Surgeon: Rob Lyles MD;  Location: [facility] CARDIAC CATH LAB    CV RIGHT HEART CATH MEASUREMENTS RECORDED N/A 5/6/2024    Procedure: Heart Cath Right Heart Cath;  Surgeon: Rob Lyles MD;  Location:  HEART CARDIAC CATH LAB    CV RIGHT HEART CATH MEASUREMENTS RECORDED N/A 9/3/2024    Procedure: Right Heart Catheterization;  Surgeon: Aaron Mesa MD;  Location:  HEART CARDIAC CATH LAB    CV RIGHT HEART CATH MEASUREMENTS RECORDED N/A 10/24/2024    Procedure: Right Heart Catheterization;  Surgeon: Haile Braden MD;  Location: [facility] CARDIAC CATH LAB    EP ICD INSERT SINGLE N/A 5/8/2024    Procedure: Implantable Cardioverter Defibrillator Device & Lead Implant Dual;  Surgeon: Guero Black MD;  Location: [facility] CARDIAC CATH LAB    INJECTION, HYDROGEL SPACER N/A 4/22/2024    Procedure: INJECTION, HYDROGEL SPACER AND FIDUCIAL MARKER PLACEMENT;  Surgeon: Stanislaw Barros MD;  Location: [facility] OR    INSERT VENTRICULAR ASSIST DEVICE LEFT (HEARTMATE II) N/A 9/18/2024    Procedure: Median Sternotomy, INSERTION of LEFT VENTRICULAR ASSIST DEVICE (HEARTMATE III), cardiopulmonary bypass, transesophageal echocardiogram performed by anesthesia.;  Surgeon: Mulvihill,  MD Stanislaw;  Location: [facility] OR    IRRIGATION AND DEBRIDEMENT CHEST WASHOUT, COMBINED Right 9/18/2024    Procedure: Exploration of chest, chest washout;  Surgeon: Mulvihill, Michael, MD;  Location: [facility] OR              Social History:   The patient lives in [place], [place].     Alcohol: 1-2 beers per day.   Tobacco: former cigarette smoker [id number].   Illicit drugs: Denies.            Family History:   Patient's family history is reviewed today and is non-contributory    Family History   Problem Relation Age of Onset    Other Cancer Mother     Obesity Mother     GI problems Father     Uterine Cancer Sister              Allergies:   Reviewed and edited as appropriate     Allergies   Allergen Reactions    Brilinta [Ticagrelor] Other (See Comments) and Difficulty breathing     Per pt and spouse, hyperventilation.    Clonazepam Other (See Comments)     Per spouse, acted like a zombie and he was shaky, could barely talk.            Medications:     Current Facility-Administered Medications   Medication Dose Route Frequency Provider Last Rate Last Admin    acetaminophen (TYLENOL) tablet 650 mg  650 mg Oral Q4H PRN Nayely Flores APRN CNP        Or    acetaminophen (TYLENOL) Suppository 650 mg  650 mg Rectal Q4H PRN Nayely Flores APRN CNP        amiodarone (PACERONE) tablet 200 mg  200 mg Oral Daily Nayely Flores APRN CNP        [Held by provider] amLODIPine (NORVASC) tablet 5 mg  5 mg Oral Daily Nayely Flores APRN CNP        atorvastatin (LIPITOR) tablet 80 mg  80 mg Oral QPM Nayely Flores APRN CNP   80 mg at 02/04/25 2148    digoxin (LANOXIN) tablet 62.5 mcg  62.5 mcg Oral Daily Nayely Flores APRN CNP        escitalopram (LEXAPRO) tablet 10 mg  10 mg Oral or Feeding Tube Daily Nayely Flores APRN CNP        gabapentin (NEURONTIN) capsule 100 mg  100 mg Oral or Feeding Tube At Bedtime Nayely Flores APRN CNP   100 mg at 02/04/25 2148     [Held by provider] hydrALAZINE (APRESOLINE) tablet 25 mg  25 mg Oral BID Nayely Flores APRN CNP        magnesium hydroxide (MILK OF MAGNESIA) suspension 30 mL  30 mL Oral Daily PRN Nayely Flores APRN CNP        magnesium oxide (MAG-OX) tablet 400 mg  400 mg Oral Daily Nayely Flores APRN CNP        melatonin tablet 1 mg  1 mg Oral At Bedtime PRN Nayely Flores APRN CNP        multivitamin w/minerals (THERA-VIT-M) tablet 1 tablet  1 tablet Oral Daily Nayely Flores APRN CNP        ondansetron (ZOFRAN ODT) ODT tab 4 mg  4 mg Oral Q6H PRN Nayely Flores APRN CNP        Or    ondansetron (ZOFRAN) injection 4 mg  4 mg Intravenous Q6H PRN Nayely Flores APRN CNP        pantoprazole (PROTONIX) IV push injection 40 mg  40 mg Intravenous BID Nayely Flores APRN CNP   40 mg at 02/04/25 2200    Patient is already receiving anticoagulation with heparin, enoxaparin (LOVENOX), warfarin (COUMADIN)  or other anticoagulant medication   Does not apply Continuous PRN Nayely Flores APRN CNP        polyethylene glycol (MIRALAX) Packet 17 g  17 g Oral BID PRN Nayely Flores APRN CNP        prochlorperazine (COMPAZINE) injection 5 mg  5 mg Intravenous Q6H PRN Nayely Flores APRN CNP        Or    prochlorperazine (COMPAZINE) tablet 5 mg  5 mg Oral Q6H PRN Nayely Flores APRN CNP        senna-docusate (SENOKOT-S/PERICOLACE) 8.6-50 MG per tablet 1 tablet  1 tablet Oral BID PRN Nayely Flores APRN CNP        Or    senna-docusate (SENOKOT-S/PERICOLACE) 8.6-50 MG per tablet 2 tablet  2 tablet Oral BID PRN Nayely Flores APRN CNP        Warfarin Dose Required Daily - Pharmacist Managed  1 each Oral See Admin Instructions Betzel, Nayely Mayela, APRN CNP         Current Outpatient Medications   Medication Sig Dispense Refill    amiodarone (PACERONE) 200 MG tablet Take 1 tablet (200 mg) by mouth daily. 90  tablet 3    amLODIPine (NORVASC) 10 MG tablet Take 0.5 tablets (5 mg) by mouth daily. 45 tablet 3    atorvastatin (LIPITOR) 80 MG tablet Take 1 tablet (80 mg) by mouth every evening. 90 tablet 3    digoxin (LANOXIN) 62.5 MCG tablet Take 1 tablet (62.5 mcg) by mouth daily. 30 tablet 11    escitalopram (LEXAPRO) 10 MG tablet Take 1 tablet (10 mg) by mouth or Feeding Tube daily. 90 tablet 3    gabapentin (NEURONTIN) 100 MG capsule Take 1 capsule (100 mg) by mouth or Feeding Tube at bedtime. 90 capsule 3    hydrALAZINE (APRESOLINE) 25 MG tablet Take 1 tablet (25 mg) by mouth 2 times daily. 180 tablet 3    warfarin ANTICOAGULANT (COUMADIN) 2.5 MG tablet Take 1 tablet (2.5 mg) by mouth every evening. 90 tablet 3    acetaminophen (TYLENOL) 325 MG tablet Take 2 tablets (650 mg) by mouth every 4 hours as needed for other (For optimal non-opioid multimodal pain management to improve pain control.).      magnesium hydroxide (MILK OF MAGNESIA) 400 MG/5ML suspension Take 30 mLs by mouth daily as needed for constipation (Use if polyethylene glycol (Miralax) is not effective after 24 hours.). 354 mL 0    magnesium oxide (MAG-OX) 400 MG tablet Take 1 tablet (400 mg) by mouth daily. 90 tablet 3    melatonin 10 MG TABS tablet Take 1 tablet (10 mg) by mouth every evening.      multivitamin w/minerals (THERA-VIT-M) tablet Take 1 tablet by mouth daily. 90 tablet 3             Review of Systems:     A complete review of systems was performed and is negative except as noted in the HPI           Physical Exam:   Temp: 98.4  F (36.9  C) Temp src: Oral   Pulse: 68   Resp: 16 SpO2: 99 % O2 Device: None (Room air)    Wt:   Wt Readings from Last 2 Encounters:   02/03/25 66.7 kg (147 lb)   01/22/25 68.9 kg (151 lb 14.4 oz)        General: 75 year old male lying in bed in NAD. Appears comfortable.  Answers appropriately.    HEENT: Head is AT/NC. Sclera anicteric.   Lungs: No increased work of breathing, speaking in full sentences, equal chest  rise. On Room Air.   Heart: Regular rate. Peripheral perfusion intact. +LVAD in place.   Abdomen: Soft, non-tender, non-distended.  No peritoneal signs.  Extremities: WWP.  Musculoskeletal: No gross deformity.  Skin: No jaundice or rash on exposed skin.  Neurologic: Grossly non-focal.  CN 2-12 grossly intact.   Mental status/Psych: A&O. Asks/answers questions appropriately. Pleasant, interactive.             Data:   LAB WORK:    BMP  Recent Labs   Lab 02/04/25 1752 02/04/25 1358 01/31/25  1438   * 135 131*   POTASSIUM 4.1 4.2 3.6   CHLORIDE 98 99 96*   PRANAV 8.6* 8.9 8.5*   CO2 24 27 25   BUN 18.5 16.0 10.0   CR 0.80 0.86 0.80   * 104* 106*     CBC  Recent Labs   Lab 02/05/25  0655 02/05/25  0045 02/04/25 1752 02/04/25 1358 01/31/25  1438   WBC 6.1  --  7.0 6.7 6.6   RBC 3.10*  --  3.13* 3.38* 3.40*   HGB 9.6*   < > 9.5* 10.3* 10.2*   HCT 27.4*  --  27.8* 30.6* 30.2*   MCV 88  --  89 91 89   MCH 31.0  --  30.4 30.5 30.0   MCHC 35.0  --  34.2 33.7 33.8   RDW 15.9*  --  15.9* 15.4* 15.3*     --  191 204 188    < > = values in this interval not displayed.     INR  Recent Labs   Lab 02/04/25 1752 02/04/25 1358 01/31/25  1438   INR 3.61* 3.31* 2.55*     LFTs  Recent Labs   Lab 02/04/25 1752 02/04/25 1358 01/31/25  1438   ALKPHOS 105 112 110   AST 25 24 24   ALT 17 19 16   BILITOTAL 0.5 0.5 0.4   PROTTOTAL 7.3 7.1 6.8   ALBUMIN 3.5 3.4* 3.4*      PANCNo lab results found in last 7 days.    IMAGING:  -- No imaging this admission.         =======================================================================

## 2025-02-05 NOTE — BRIEF OP NOTE
Gastroenterology Endoscopy Suite Brief Operative Note    Procedure:  Flexible sigmoidoscopy   Post-operative diagnosis:  Radiation proctitis   Staff Physician:  Dr. Jp Cartwright   Fellow/Assistant(s):  Josue Sam    Specimens:  Please see final procedure note for further details.   Findings:  Moderate rectal erythema with stigmata of recent oozing bleeding consistent with radiation proctitis.   Complications:  None.   Condition:  Stable   Recommendations  Diet:  Return to previous diet  PPI:  N/A  Anti-coagulants/platelets:  Okay for AC from GI perspective  Octreotide:  N/A  Discharge Planning:   - Sucralfate enemas BID x4 weeks (can discontinue sooner if bleeding resolves)  - Follow up GI as needed  - Inpatient GI will sign off at this time

## 2025-02-05 NOTE — PROGRESS NOTES
Assumed care [id number]  NEURO: A&O x4  CARDIAC: HM3 LVAD, MAPs >65, LVAD numbers WNL per flowsheets. LVAD dressing CDI  RESPIRATORY: Satting >92% on RA, denies SOB  GI/: Adequate urine output per flowsheets. Pt had a few stools this am with a small amount of blood when wiping. No agatha blood noted.  DIET/APPETITE: Tolerating regular diet, eating well.   ACTIVITY: Up to bedside commode independently.  PAIN: Denies pain   SKIN: No new deficits noted  Lines / Drains / Airway: R PIVx1    PLAN: Continue with plan of care, notify primary care team with changes.

## 2025-02-05 NOTE — MEDICATION SCRIBE - ADMISSION MEDICATION HISTORY
Medication Scribe Admission Medication History    Admission medication history is complete. The information provided in this note is only as accurate as the sources available at the time of the update.    Information Source(s): Patient and Family member via in-person    Pertinent Information: Wife verified medications he is currently taking. Patient denies taking any other medications. Dispense report and outside medication reconciliation list have been reviewed.     Changes made to PTA medication list:  Added: None  Deleted: None  Changed: None    Allergies reviewed with patient and updates made in EHR: yes    Medication History Completed By: Jewell Estrella 2/5/2025 5:48 PM    PTA Med List   Medication Sig Last Dose/Taking    acetaminophen (TYLENOL) 325 MG tablet Take 2 tablets (650 mg) by mouth every 4 hours as needed for other (For optimal non-opioid multimodal pain management to improve pain control.). Unknown    amiodarone (PACERONE) 200 MG tablet Take 1 tablet (200 mg) by mouth daily. 2/4/2025 Morning    amLODIPine (NORVASC) 10 MG tablet Take 0.5 tablets (5 mg) by mouth daily. 2/4/2025 Morning    atorvastatin (LIPITOR) 80 MG tablet Take 1 tablet (80 mg) by mouth every evening. 2/3/2025    digoxin (LANOXIN) 62.5 MCG tablet Take 1 tablet (62.5 mcg) by mouth daily. 2/4/2025 Morning    escitalopram (LEXAPRO) 10 MG tablet Take 1 tablet (10 mg) by mouth or Feeding Tube daily. 2/4/2025 Morning    gabapentin (NEURONTIN) 100 MG capsule Take 1 capsule (100 mg) by mouth or Feeding Tube at bedtime. 2/3/2025    hydrALAZINE (APRESOLINE) 25 MG tablet Take 1 tablet (25 mg) by mouth 2 times daily. 2/4/2025 Morning    magnesium hydroxide (MILK OF MAGNESIA) 400 MG/5ML suspension Take 30 mLs by mouth daily as needed for constipation (Use if polyethylene glycol (Miralax) is not effective after 24 hours.). Taking As Needed    magnesium oxide (MAG-OX) 400 MG tablet Take 1 tablet (400 mg) by mouth daily. 2/3/2025    melatonin 10 MG  TABS tablet Take 1 tablet (10 mg) by mouth every evening. 2/3/2025    multivitamin w/minerals (THERA-VIT-M) tablet Take 1 tablet by mouth daily. 2/3/2025    warfarin ANTICOAGULANT (COUMADIN) 2.5 MG tablet Take 1 tablet (2.5 mg) by mouth every evening. 2/3/2025

## 2025-02-05 NOTE — H&P
[facility]    History and Physical - Hospitalist Service, GOLD TEAM        Date of Admission:  2/4/2025    Assessment & Plan      Duane C Johnson is a 75 year old male admitted on 2/4/2025. He has PMH of ischemic CM s/p LVAD who presented to the ED with complaints of dizziness, weakness, hematochezia. Patient admitted to Medicine for further evaluation and care.     # Hematochezia  # Acute on chronic anemia  # Supratherapeutic INR  Patient presented to ED for evaluation of hematochezia as directed by cardiology team.  This is ongoing for about 1 week prior to presentation.  Supratherapeutic therapeutic INR on admission with INR of 3.61.  INR goal 2-3 for LVAD.  Hemoglobin on presentation 10.3 which is near his baseline but did drop to 9.5.    -GI consult    -Every 6 hours hemoglobin check    -pantoprazole 40mg BID    -maintain 2 large bore Ivs    -type and screen    -add on iron studies to previous labs    Chronic problems:  # Ischemic cardiomyopathy 2/2 cardiac arrest (2023) - S/p LVAD   Cardiology clinic visit 2/3 with Dr. Cruz, noting concern for GI bleed, ongoing goals to maintain appropriate fluid intake, cardiac rehab participation, and reporting med compliance.    -Cardiology II routine consult placed. Requested team to place LVAD orders on night of admission    -Daily weights    -Anticoagulation: Warfarin, INR 3.61 on admission. INR goal 2-3.0. Pharmacy consulted for warfarin dosing.    -amlodipine and hydralazine HELD on admission given dizziness, concern for bleeding.     -Continue PTA digoxin 62.5 mcg daily and amiodarone 200 mg daily    # Depression    -Continue PTA escitalopram 10 mg daily    # HLD    -Continue PTA atorvastatin 80 mg nightly         Observation Goals: -diagnostic tests and consults completed and resulted, -vital signs normal or at patient baseline, -returns to baseline functional status, Nurse to notify provider when observation goals have  been met and patient is ready for discharge.  Diet: Combination Diet Low Saturated Fat Na <2400mg Diet  NPO per Anesthesia Guidelines for Procedure/Surgery Except for: Meds  DVT Prophylaxis: Warfarin  Michael Catheter: Not present  Lines: None     Cardiac Monitoring: ACTIVE order. Indication: presyncope  Code Status: Full Code    Clinically Significant Risk Factors Present on Admission         # Hyponatremia: Lowest Na = 132 mmol/L in last 2 days, will monitor as appropriate   # Hypocalcemia: Lowest Ca = 8.6 mg/dL in last 2 days, will monitor and replace as appropriate        # Drug Induced Coagulation Defect: home medication list includes an anticoagulant medication    # Hypertension: Home medication list includes antihypertensive(s)  # End stage heart failure: Ventricular assist device (VAD) present     # Anemia: based on hgb <11  # Anemia: based on hgb <11           # Financial/Environmental Concerns:     # ICD device present       Disposition Plan     Medically Ready for Discharge: Anticipated in 2-4 Days     The patient's care was discussed with the Attending Physician, Dr. Ogden and Patient.    NADEGE Coleman [facility]  Hospitalist Service, [facility]  Securely message with [employer] (more info)  Text page via [facility] Paging/Directory   See signed in provider for up to date coverage information    ______________________________________________________________________    Chief Complaint   Rectal bleeding    History is obtained from the patient and electronic health record    History of Present Illness   Duane C Johnson is a 75 year old male who has history of nonischemic cardiomyopathy secondary to cardiac arrest in 2023, status post LVAD, mild cognitive impairment, hypertension, hyperlipidemia, depression, insomnia.  He presented to emergency department at recommendation of his cardiology team to be evaluated for bright red blood per rectum.       Though patient states he is feeling well, he did have a hemoglobin drop from 10.3 to 9.5. Patient also has intermittent dizziness which is not new for him; he states unsure if frequency of dizziness spells has increased.  Patient states he can do on a dizzy spell is coming on and he will sit down on the floor.  This happened most recently 2 or 3 days prior to admission.  He states that he has had intermittent bright red blood per rectum for about 1 week. This has not happened before.      Plans for admission for specialist consult, serial hemoglobin check, warfarin management given supratherapeutic INR and GI bleed.      Past Medical History    Past Medical History:   Diagnosis Date    Benign essential hypertension     CAD (coronary artery disease)     Chronic systolic heart failure (H)     Hypertension     ST elevation myocardial infarction (STEMI), unspecified artery (H)     Ventricular fibrillation (H)      Past Surgical History   Past Surgical History:   Procedure Laterality Date    ANESTHESIA CARDIOVERSION N/A 9/5/2024    Procedure: Anesthesia cardioversion;  Surgeon: GENERIC ANESTHESIA PROVIDER;  Location: [facility] OR    ANESTHESIA CARDIOVERSION N/A 9/30/2024    Procedure: Anesthesia cardioversion;  Surgeon: GENERIC ANESTHESIA PROVIDER;  Location: [facility] OR    COLONOSCOPY N/A 3/23/2023    Procedure: COLONOSCOPY, FLEXIBLE, WITH LESION REMOVAL USING SNARE;  Surgeon: Jose Faustin MD;  Location: [facility]    CV CENTRAL VENOUS CATHETER PLACEMENT N/A 10/26/2023    Procedure: Central Venous Catheter Placement;  Surgeon: Rob Lyles MD;  Location:  HEART CARDIAC CATH LAB    CV CORONARY ANGIOGRAM N/A 10/27/2023    Procedure: Coronary Angiogram;  Surgeon: Rob Lyles MD;  Location: [facility] CARDIAC CATH LAB    CV CORONARY ANGIOGRAM N/A 10/26/2023    Procedure: Coronary Angiogram;  Surgeon: Rob Lyles MD;  Location:  HEART CARDIAC CATH LAB    CV LEFT HEART CATH N/A 10/26/2023     Procedure: Left Heart Catheterization;  Surgeon: Rob Lyles MD;  Location:  HEART CARDIAC CATH LAB    CV PCI N/A 10/27/2023    Procedure: Percutaneous Coronary Intervention;  Surgeon: Rob Lyles MD;  Location:  HEART CARDIAC CATH LAB    CV PCI STENT DRUG ELUTING N/A 10/26/2023    Procedure: Percutaneous Coronary Intervention Stent;  Surgeon: Rob Lyles MD;  Location:  HEART CARDIAC CATH LAB    CV RIGHT HEART CATH MEASUREMENTS RECORDED N/A 5/6/2024    Procedure: Heart Cath Right Heart Cath;  Surgeon: Rob Lyles MD;  Location:  HEART CARDIAC CATH LAB    CV RIGHT HEART CATH MEASUREMENTS RECORDED N/A 9/3/2024    Procedure: Right Heart Catheterization;  Surgeon: Aaron Mesa MD;  Location:  HEART CARDIAC CATH LAB    CV RIGHT HEART CATH MEASUREMENTS RECORDED N/A 10/24/2024    Procedure: Right Heart Catheterization;  Surgeon: Haile Braden MD;  Location: [facility] CARDIAC CATH LAB    EP ICD INSERT SINGLE N/A 5/8/2024    Procedure: Implantable Cardioverter Defibrillator Device & Lead Implant Dual;  Surgeon: Guero Black MD;  Location: [facility] CARDIAC CATH LAB    INJECTION, HYDROGEL SPACER N/A 4/22/2024    Procedure: INJECTION, HYDROGEL SPACER AND FIDUCIAL MARKER PLACEMENT;  Surgeon: Stanislaw Barros MD;  Location:  OR    INSERT VENTRICULAR ASSIST DEVICE LEFT (HEARTMATE II) N/A 9/18/2024    Procedure: Median Sternotomy, INSERTION of LEFT VENTRICULAR ASSIST DEVICE (HEARTMATE III), cardiopulmonary bypass, transesophageal echocardiogram performed by anesthesia.;  Surgeon: Mulvihill, Michael, MD;  Location: [facility] OR    IRRIGATION AND DEBRIDEMENT CHEST WASHOUT, COMBINED Right 9/18/2024    Procedure: Exploration of chest, chest washout;  Surgeon: Mulvihill, Michael, MD;  Location: [facility] OR       Prior to Admission Medications   Prior to Admission Medications   Prescriptions Last Dose Informant Patient Reported? Taking?   acetaminophen  (TYLENOL) 325 MG tablet   No No   Sig: Take 2 tablets (650 mg) by mouth every 4 hours as needed for other (For optimal non-opioid multimodal pain management to improve pain control.).   amLODIPine (NORVASC) 10 MG tablet   No No   Sig: Take 0.5 tablets (5 mg) by mouth daily.   amiodarone (PACERONE) 200 MG tablet   No No   Sig: Take 1 tablet (200 mg) by mouth daily.   atorvastatin (LIPITOR) 80 MG tablet   No No   Sig: Take 1 tablet (80 mg) by mouth every evening.   digoxin (LANOXIN) 62.5 MCG tablet   No No   Sig: Take 1 tablet (62.5 mcg) by mouth daily.   escitalopram (LEXAPRO) 10 MG tablet   No No   Sig: Take 1 tablet (10 mg) by mouth or Feeding Tube daily.   gabapentin (NEURONTIN) 100 MG capsule   No No   Sig: Take 1 capsule (100 mg) by mouth or Feeding Tube at bedtime.   hydrALAZINE (APRESOLINE) 25 MG tablet   No No   Sig: Take 1 tablet (25 mg) by mouth 2 times daily.   magnesium hydroxide (MILK OF MAGNESIA) 400 MG/5ML suspension   No No   Sig: Take 30 mLs by mouth daily as needed for constipation (Use if polyethylene glycol (Miralax) is not effective after 24 hours.).   magnesium oxide (MAG-OX) 400 MG tablet   No No   Sig: Take 1 tablet (400 mg) by mouth daily.   melatonin 10 MG TABS tablet   No No   Sig: Take 1 tablet (10 mg) by mouth every evening.   multivitamin w/minerals (THERA-VIT-M) tablet   No No   Sig: Take 1 tablet by mouth daily.   warfarin ANTICOAGULANT (COUMADIN) 2.5 MG tablet   No No   Sig: Take 1 tablet (2.5 mg) by mouth every evening.      Facility-Administered Medications: None           Physical Exam   Vital Signs: Temp: 98.4  F (36.9  C) Temp src: Oral   Pulse: 76   Resp: 18 SpO2: 97 % O2 Device: None (Room air)      Physical Exam  Vitals and nursing note reviewed.   Constitutional:       General: He is not in acute distress.     Appearance: He is not toxic-appearing.   Cardiovascular:      Comments: LVAD  Pulmonary:      Effort: Pulmonary effort is normal. No respiratory distress.      Breath  sounds: Normal breath sounds. No wheezing or rales.   Abdominal:      Tenderness: There is no abdominal tenderness. There is no guarding.   Skin:     General: Skin is warm and dry.      Coloration: Skin is pale.   Neurological:      Mental Status: He is alert and oriented to person, place, and time. Mental status is at baseline.      Motor: Weakness (Generalized) present.   Psychiatric:         Mood and Affect: Mood normal.       Medical Decision Making       80 MINUTES SPENT BY ME on the date of service doing chart review, history, exam, documentation & further activities per the note.      Data     I have personally reviewed the following data over the past 24 hrs:    7.0  \   9.5 (L)   / 191     132 (L) 98 18.5 /  105 (H)   4.1 24 0.80 \     ALT: 17 AST: 25 AP: 105 TBILI: 0.5   ALB: 3.5 TOT PROTEIN: 7.3 LIPASE: N/A     Trop: 14 BNP: N/A     INR:  3.61 (H) PTT:  52 (H)   D-dimer:  N/A Fibrinogen:  N/A

## 2025-02-05 NOTE — PROGRESS NOTES
[facility]    Medicine Progress Note - Hospitalist Service, GOLD TEAM 6    Date of Admission:  2/4/2025    Assessment & Plan      Duane C Johnson is a 75 year old male admitted on 2/4/2025. He has PMH of ischemic CM s/p LVAD who presented to the ED with complaints of dizziness, weakness, hematochezia. Patient admitted to Medicine for further evaluation and care.     Hematochezia  Acute on chronic anemia  Supratherapeutic INR  Patient presented to ED for evaluation of hematochezia as directed by cardiology team.  This is ongoing for about 1 week prior to presentation.  Supratherapeutic therapeutic INR on admission with INR of 3.61.  INR goal 2-3 for LVAD. Iron low, TIBC low, Ferritin WNL.  2 large bore Ivs placed. Hemoglobin on presentation 10.3, drifted down to 9.6 this morning. INR remains elevated at 3.92, though Hgb remains stable. Type and screen completed  -- GI consulted, appreciate recs   > Flexible Sigmoidoscopy completed today showing radiation proctitis with no treatable lesion.    > Sucralfate enemas 2g BID x4 weeks (can discontinue sooner if bleeding resolves)  -- q6 Hgb  -- IV pantoprazole 40mg BID  -- Pharmacy to dose warfarin for INR goal 2-3  -- Will monitor Hgb and vitals overnight and possibly DC home tomorrow      Ischemic cardiomyopathy 2/2 cardiac arrest (2023)   S/p LVAD   HTN with episodes of hypotension   Cardiology clinic visit 2/3 with Dr. Cruz, noting concern for GI bleed, ongoing goals to maintain appropriate fluid intake, cardiac rehab participation, and reporting med compliance. Noted to have labile BPs outpatient with decreasing his antihypertensives over the last few months. PTA on Amlodipine, Hydralazine, Digoxin, and Amiodarone. On Warfarin for anticoag with supratherapeutic therapeutic INR on admission with INR of 3.61.  INR goal 2-3 for LVAD.  -- Cardiology II routine consult placed, appreciate recs   > 1L IVF recommended prior to flex  sig  -- Pharmacy to dose warfarin for INR goal 2-3  -- HOLD Amlodipine and Hydralazine given dizziness and bleeding.   -- Continue PTA digoxin 62.5 mcg daily   -- Continue PTA amiodarone 200 mg daily  -- Daily weights    Depression  -- Continue PTA escitalopram 10 mg daily    HLD  -- Continue PTA atorvastatin 80 mg nightly    C/f Mild cognitive dysfunction  Noted during admission in 10/24 to have mild cognitive dysfunction and possible Alzheimers disease  -- Delirium precautions   Avoid benzodiazepines, antihistamines, anticholinergics if able.  Lights on and blinds open during the day.  Reorient frequently.  Lights & TV off during the night.  Promote normal circadian rhythm.  Limit sensory deprivation - utilize hearing aids, glasses, etc.  Frequently assess basic needs such as temperature, elimination, thirst/hunger, pain     Observation Goals: -diagnostic tests and consults completed and resulted, -vital signs normal or at patient baseline, -returns to baseline functional status, Nurse to notify provider when observation goals have been met and patient is ready for discharge.  Diet: Combination Diet Low Fat Diet (up to 50g)    DVT Prophylaxis: Warfarin  Michael Catheter: Not present  Lines: None     Cardiac Monitoring: ACTIVE order. Indication: presyncope  Code Status: Full Code      Clinically Significant Risk Factors Present on Admission         # Hyponatremia: Lowest Na = 132 mmol/L in last 2 days, will monitor as appropriate   # Hypocalcemia: Lowest Ca = 8.6 mg/dL in last 2 days, will monitor and replace as appropriate     # Hypoalbuminemia: Lowest albumin = 3.4 g/dL at 2/4/2025  1:58 PM, will monitor as appropriate  # Drug Induced Coagulation Defect: home medication list includes an anticoagulant medication    # Hypertension: Home medication list includes antihypertensive(s)  # End stage heart failure: Ventricular assist device (VAD) present     # Anemia: based on hgb <11  # Anemia: based on hgb <11           #  Financial/Environmental Concerns:     # ICD device present       Social Drivers of Health    Housing Stability: Low Risk  (10/23/2024)    Housing Stability     Do you have housing? : Yes     Are you worried about losing your housing?: No   Recent Concern: Housing Stability - High Risk (9/1/2024)    Housing Stability     Do you have housing? : No     Are you worried about losing your housing?: No   Tobacco Use: Medium Risk (2/3/2025)    Patient History     Smoking Tobacco Use: Former     Smokeless Tobacco Use: Never     Passive Exposure: Past   Physical Activity: Inactive (11/16/2023)    Exercise Vital Sign     Days of Exercise per Week: 0 days     Minutes of Exercise per Session: 0 min   Interpersonal Safety: Low Risk  (10/23/2024)    Interpersonal Safety     Do you feel physically and emotionally safe where you currently live?: Yes     Within the past 12 months, have you been hit, slapped, kicked or otherwise physically hurt by someone?: No     Within the past 12 months, have you been humiliated or emotionally abused in other ways by your partner or ex-partner?: No   Recent Concern: Interpersonal Safety - High Risk (10/17/2024)    Interpersonal Safety     Do you feel physically and emotionally safe where you currently live?: No     Within the past 12 months, have you been hit, slapped, kicked or otherwise physically hurt by someone?: No     Within the past 12 months, have you been humiliated or emotionally abused in other ways by your partner or ex-partner?: No   Social Connections: Unknown (11/16/2023)    Social Connection and Isolation Panel [NHANES]     Marital Status:           Disposition Plan     Medically Ready for Discharge:  Anticipate tomorrow       The patient's care was discussed with the Attending Physician, Dr. Cowan, Bedside Nurse, Patient, and GI, Cards Consultant(s).    Blanca Johnson PA-C  Hospitalist Service, GOLD TEAM 6  [facility]  Securely  message with [employer] (more info)  Text page via [facility] Paging/Directory   See signed in provider for up to date coverage information  ______________________________________________________________________    Interval History   Patient seen in the ED prior to procedure, denies any concerns including CP, SOB, dyspnea, lightheadedness, abdominal pain, N/V. Has very dark watery stool in sample cup at bedside.    Physical Exam   Vital Signs: Temp: 98.4  F (36.9  C) Temp src: Oral   Pulse: 68   Resp: 16 SpO2: 99 % O2 Device: None (Room air)    Weight: 0 lbs 0 oz    Physical Exam  Vitals reviewed.   Constitutional:       General: He is not in acute distress.     Appearance: He is not diaphoretic.   Cardiovascular:      Comments: LVAD  Pulmonary:      Effort: Pulmonary effort is normal.      Breath sounds: No wheezing, rhonchi or rales.   Skin:     General: Skin is warm and dry.   Neurological:      General: No focal deficit present.      Mental Status: He is alert and oriented to person, place, and time.           Medical Decision Making       60 MINUTES SPENT BY ME on the date of service doing chart review, history, exam, documentation & further activities per the note.      Data     I have personally reviewed the following data over the past 24 hrs:    6.1  \   9.6 (L)   / 181     132 (L) 98 18.5 /  105 (H)   4.1 24 0.80 \     ALT: 17 AST: 25 AP: 105 TBILI: 0.5   ALB: 3.5 TOT PROTEIN: 7.3 LIPASE: N/A     Trop: 14 BNP: N/A     INR:  3.92 (H) PTT:  52 (H)   D-dimer:  N/A Fibrinogen:  N/A     Ferritin:  279 % Retic:  2.7 (H) LDH:  214       Imaging results reviewed over the past 24 hrs:   No results found for this or any previous visit (from the past 24 hours).

## 2025-02-05 NOTE — OR NURSING
Pt underwent flexible sigmoidoscopy under conscious sedation. Specimens: none. Pt transported back to ED and report called to bedside RN.       Aby Alcaraz RN

## 2025-02-05 NOTE — PROGRESS NOTES
D: Stopped by to see patient. No VAD related questions or concerns at this time.   I: Discussed POC and provided support and listened to patient and caregiver's thoughts and concerns.  P: Continue to follow patient and address any questions or concerns patient and or caregiver may have.      Indication of Interrogation:  Bleeding, eval for hemodynamic fxn, flows/PI    Type of VAD:  Heartmate 3    Current Parameters:  Flow= 2.8 lpm, Speed= 5000 rpm, Power= 3.2 jackson, PI (if applicable)= 7.6    Abnormal Alarm on History:  No    Abnormal Events/Parameters Notes:  Yes, explain: Frequent PI events and frequent speed drops.    Changes Made during Interrogation:  No

## 2025-02-06 VITALS
WEIGHT: 150.3 LBS | SYSTOLIC BLOOD PRESSURE: 108 MMHG | TEMPERATURE: 98 F | DIASTOLIC BLOOD PRESSURE: 91 MMHG | HEART RATE: 74 BPM | OXYGEN SATURATION: 97 % | BODY MASS INDEX: 24.26 KG/M2 | RESPIRATION RATE: 16 BRPM

## 2025-02-06 LAB
ALBUMIN SERPL BCG-MCNC: 3.2 G/DL (ref 3.5–5.2)
ALP SERPL-CCNC: 99 U/L (ref 40–150)
ALT SERPL W P-5'-P-CCNC: 16 U/L (ref 0–70)
ANION GAP SERPL CALCULATED.3IONS-SCNC: 9 MMOL/L (ref 7–15)
AST SERPL W P-5'-P-CCNC: 24 U/L (ref 0–45)
BILIRUB SERPL-MCNC: 0.5 MG/DL
BUN SERPL-MCNC: 14.1 MG/DL (ref 8–23)
CALCIUM SERPL-MCNC: 8.5 MG/DL (ref 8.8–10.4)
CHLORIDE SERPL-SCNC: 99 MMOL/L (ref 98–107)
CREAT SERPL-MCNC: 0.81 MG/DL (ref 0.67–1.17)
EGFRCR SERPLBLD CKD-EPI 2021: >90 ML/MIN/1.73M2
ERYTHROCYTE [DISTWIDTH] IN BLOOD BY AUTOMATED COUNT: 15.7 % (ref 10–15)
GLUCOSE SERPL-MCNC: 95 MG/DL (ref 70–99)
HCO3 SERPL-SCNC: 24 MMOL/L (ref 22–29)
HCT VFR BLD AUTO: 28.5 % (ref 40–53)
HGB BLD-MCNC: 9.3 G/DL (ref 13.3–17.7)
HGB BLD-MCNC: 9.4 G/DL (ref 13.3–17.7)
INR PPP: 4.76 (ref 0.85–1.15)
MCH RBC QN AUTO: 29.4 PG (ref 26.5–33)
MCHC RBC AUTO-ENTMCNC: 32.6 G/DL (ref 31.5–36.5)
MCV RBC AUTO: 90 FL (ref 78–100)
PATH REPORT.COMMENTS IMP SPEC: NORMAL
PATH REPORT.COMMENTS IMP SPEC: NORMAL
PATH REPORT.FINAL DX SPEC: NORMAL
PATH REPORT.MICROSCOPIC SPEC OTHER STN: NORMAL
PATH REPORT.MICROSCOPIC SPEC OTHER STN: NORMAL
PATH REPORT.RELEVANT HX SPEC: NORMAL
PLATELET # BLD AUTO: 193 10E3/UL (ref 150–450)
POTASSIUM SERPL-SCNC: 4.1 MMOL/L (ref 3.4–5.3)
PROT SERPL-MCNC: 6.7 G/DL (ref 6.4–8.3)
RBC # BLD AUTO: 3.16 10E6/UL (ref 4.4–5.9)
SODIUM SERPL-SCNC: 132 MMOL/L (ref 135–145)
WBC # BLD AUTO: 6 10E3/UL (ref 4–11)

## 2025-02-06 PROCEDURE — 250N000009 HC RX 250: Performed by: NURSE PRACTITIONER

## 2025-02-06 PROCEDURE — 85027 COMPLETE CBC AUTOMATED: CPT

## 2025-02-06 PROCEDURE — 250N000013 HC RX MED GY IP 250 OP 250 PS 637

## 2025-02-06 PROCEDURE — 93750 INTERROGATION VAD IN PERSON: CPT | Mod: GC | Performed by: STUDENT IN AN ORGANIZED HEALTH CARE EDUCATION/TRAINING PROGRAM

## 2025-02-06 PROCEDURE — 120N000005 HC R&B MS OVERFLOW UMMC

## 2025-02-06 PROCEDURE — 85610 PROTHROMBIN TIME: CPT | Performed by: NURSE PRACTITIONER

## 2025-02-06 PROCEDURE — 82565 ASSAY OF CREATININE: CPT

## 2025-02-06 PROCEDURE — 36415 COLL VENOUS BLD VENIPUNCTURE: CPT

## 2025-02-06 PROCEDURE — 250N000013 HC RX MED GY IP 250 OP 250 PS 637: Performed by: NURSE PRACTITIONER

## 2025-02-06 PROCEDURE — 99207 PR APP CREDIT; MD BILLING SHARED VISIT: CPT | Mod: FS | Performed by: STUDENT IN AN ORGANIZED HEALTH CARE EDUCATION/TRAINING PROGRAM

## 2025-02-06 PROCEDURE — 85018 HEMOGLOBIN: CPT

## 2025-02-06 PROCEDURE — 99232 SBSQ HOSP IP/OBS MODERATE 35: CPT | Mod: 25 | Performed by: STUDENT IN AN ORGANIZED HEALTH CARE EDUCATION/TRAINING PROGRAM

## 2025-02-06 PROCEDURE — 99233 SBSQ HOSP IP/OBS HIGH 50: CPT | Mod: FS

## 2025-02-06 RX ORDER — SUCRALFATE ORAL 1 G/10ML
2 SUSPENSION ORAL 2 TIMES DAILY
Qty: 1080 ML | Refills: 0 | Status: SHIPPED | OUTPATIENT
Start: 2025-02-06 | End: 2025-03-05

## 2025-02-06 RX ORDER — DIGOXIN 0.06 MG/1
62.5 TABLET ORAL DAILY
Qty: 90 TABLET | Refills: 0 | Status: SHIPPED | OUTPATIENT
Start: 2025-02-06

## 2025-02-06 RX ADMIN — SUCRALFATE 2 G: 1 SUSPENSION ORAL at 08:35

## 2025-02-06 RX ADMIN — SUCRALFATE 2 G: 1 SUSPENSION ORAL at 14:46

## 2025-02-06 RX ADMIN — GABAPENTIN 100 MG: 100 CAPSULE ORAL at 20:16

## 2025-02-06 RX ADMIN — ATORVASTATIN CALCIUM 80 MG: 80 TABLET, FILM COATED ORAL at 20:17

## 2025-02-06 RX ADMIN — PANTOPRAZOLE SODIUM 40 MG: 40 INJECTION, POWDER, FOR SOLUTION INTRAVENOUS at 08:35

## 2025-02-06 RX ADMIN — MAGNESIUM OXIDE TAB 400 MG (241.3 MG ELEMENTAL MG) 400 MG: 400 (241.3 MG) TAB at 08:34

## 2025-02-06 RX ADMIN — ESCITALOPRAM OXALATE 10 MG: 10 TABLET ORAL at 08:34

## 2025-02-06 RX ADMIN — DIGOXIN 62.5 MCG: 0.06 TABLET ORAL at 08:34

## 2025-02-06 RX ADMIN — Medication 1 TABLET: at 08:34

## 2025-02-06 RX ADMIN — AMIODARONE HYDROCHLORIDE 200 MG: 200 TABLET ORAL at 08:34

## 2025-02-06 ASSESSMENT — ACTIVITIES OF DAILY LIVING (ADL)
ADLS_ACUITY_SCORE: 41
ADLS_ACUITY_SCORE: 39
ADLS_ACUITY_SCORE: 39
ADLS_ACUITY_SCORE: 41
ADLS_ACUITY_SCORE: 39
ADLS_ACUITY_SCORE: 41
ADLS_ACUITY_SCORE: 39
ADLS_ACUITY_SCORE: 41
ADLS_ACUITY_SCORE: 39
ADLS_ACUITY_SCORE: 41
DEPENDENT_IADLS:: INDEPENDENT
ADLS_ACUITY_SCORE: 41
ADLS_ACUITY_SCORE: 39
ADLS_ACUITY_SCORE: 41
ADLS_ACUITY_SCORE: 39
ADLS_ACUITY_SCORE: 41
ADLS_ACUITY_SCORE: 39

## 2025-02-06 NOTE — PROGRESS NOTES
Pt arrived to unit via cart at 2215 baseline vitals done and stable. LVAD self test done, no alarms. According to report low PI reported to team. Doppler MAP at 85. Skin assessment done by writer and RN Maxwell PEDRO Denies pain or discomfort.     0530: NURSING PROGRESS NOTE  Shift Summary      Date: February 6, 2025     Neuro/Musculoskeletal:  A&Ox4.   Cardiac:  SR.  VSS.  LVAD HM 3 no alarms   Respiratory:  Sating in the 90s on RA.  GI/:  Adequate urine output.  LBM: [place]  Diet/Appetite:  Tolerating Regular diet.  Activity:  Independent   Pain:  Denies.   Skin:  No new deficits noted. Driveline dressed.   LDAs + Drips/IVF:  R PIV SL  Protocols/Labs:  Per orders. Hgb    Pertinent Shift Updates:  Uneventful night with no acute changes, no LVAD alarms noted overnight. One episode of BM with red blood streaks.       Plan:  Likely to discharge to home today.       Derrick Taveras RN  .................................................... February 6, 2025   5:47 AM  [facility] ([facility]): [facility] ICU (Unit 6D)

## 2025-02-06 NOTE — CONSULTS
Care Management Initial Consult    General Information  Assessment completed with: Patient, VM-chart review,    Type of CM/SW Visit: Offer D/C Planning    Primary Care Provider verified and updated as needed: Yes   Readmission within the last 30 days: no previous admission in last 30 days      Reason for Consult:  (ERS)  Advance Care Planning: Advance Care Planning Reviewed: no concerns identified        Communication Assessment  Patient's communication style: spoken language (English or Bilingual)    Hearing Difficulty or Deaf: no   Wear Glasses or Blind: yes    Cognitive  Cognitive/Neuro/Behavioral: WDL                      Living Environment:   People in home: spouse  Melissa  Current living Arrangements: house      Able to return to prior arrangements:         Family/Social Support:  Care provided by: spouse/significant other  Provides care for: no one  Marital Status:   Support system: Wife  Melissa       Description of Support System: Supportive, Involved       Current Resources:   Patient receiving home care services: No     Community Resources: Cardiac Rehab  Equipment currently used at home: grab bar, toilet, grab bar, tub/shower  Supplies currently used at home: Wound Care Supplies (LVAD dressing changes)    Employment/Financial:  Employment Status: retired        Financial Concerns: none     Does the patient's insurance plan have a 3 day qualifying hospital stay waiver?  Yes     Which insurance plan 3 day waiver is available? [employer]    Will the waiver be used for post-acute placement? Undetermined at this time    Lifestyle & Psychosocial Needs:  Social Drivers of Health     Food Insecurity: Low Risk  (2/5/2025)    Food Insecurity     Within the past 12 months, did you worry that your food would run out before you got money to buy more?: No     Within the past 12 months, did the food you bought just not last and you didn t have money to get more?: No   Depression: Not at risk (2/3/2025)    PHQ-2      PHQ-2 Score: 0   Housing Stability: High Risk (2/5/2025)    Housing Stability     Do you have housing? : No     Are you worried about losing your housing?: No   Tobacco Use: Medium Risk (2/3/2025)    Patient History     Smoking Tobacco Use: Former     Smokeless Tobacco Use: Never     Passive Exposure: Past   Financial Resource Strain: Low Risk  (2/5/2025)    Financial Resource Strain     Within the past 12 months, have you or your family members you live with been unable to get utilities (heat, electricity) when it was really needed?: No   Alcohol Use: Not on file   Transportation Needs: Low Risk  (2/5/2025)    Transportation Needs     Within the past 12 months, has lack of transportation kept you from medical appointments, getting your medicines, non-medical meetings or appointments, work, or from getting things that you need?: No   Physical Activity: Inactive (11/16/2023)    Exercise Vital Sign     Days of Exercise per Week: 0 days     Minutes of Exercise per Session: 0 min   Interpersonal Safety: Low Risk  (2/5/2025)    Interpersonal Safety     Do you feel physically and emotionally safe where you currently live?: Yes     Within the past 12 months, have you been hit, slapped, kicked or otherwise physically hurt by someone?: No     Within the past 12 months, have you been humiliated or emotionally abused in other ways by your partner or ex-partner?: No   Stress: Not on file   Social Connections: Unknown (11/16/2023)    Social Connection and Isolation Panel [NHANES]     Frequency of Communication with Friends and Family: Not on file     Frequency of Social Gatherings with Friends and Family: Not on file     Attends [employer] Services: Not on file     Active Member of Clubs or Organizations: Not on file     Attends Club or Organization Meetings: Not on file     Marital Status:    Health Literacy: Not on file       Functional Status:  Prior to admission patient needed assistance:   Dependent ADLs::  Ambulation-walker  Dependent IADLs:: Independent  Assesssment of Functional Status: At functional baseline    Mental Health Status: No current concerns          Chemical Dependency Status: No current concerns                Values/Beliefs:  Spiritual, Cultural Beliefs, Christianity Practices, Values that affect care:                 Discussed  Partnership in Safe Discharge Planning  document with patient/family: No    Additional Information:  Writer familiar with patient due to LVAD implant on 9/18/24. Patient admitted to ED due to rectal bleeding. He reports he has had lower energy the past several days and stopped going to his cardiac rehab this past week in [place] due to this. He is anticipated to discharge home within the next day.    Duane reports he has been doing well s/p VAD implant. He indicates beginning to work on some of his projects at home, including building a new computer and working with the [employer] on their proposal for a [employer] project. He indicates he has been adjusting okay to life with the LVAD and that his wife continues to do his weekly dressing changes.     Patient confirmed having a safe place to discharge to and is not at risk to losing his house. He denies any financial concerns. SW closing SDOH consult placed.    He reports not having Home Care at this time, though was completing OP Cardiac Rehab in [facility]. He may need new orders faxed upon discharge.    Next Steps: SW and RNCC to follow for potential discharge needs that arise. May need new OP CR order sent.    Jessi Almendarez, MSW, LGSW, APSW  Heart Transplant/[facility]   [employer]  Ph. [phone]

## 2025-02-06 NOTE — PROGRESS NOTES
[facility]    Medicine Progress Note - Hospitalist Service, GOLD TEAM 6    Date of Admission:  2/4/2025    Assessment & Plan   Duane C Johnson is a 75 year old male admitted on 2/4/2025. He has PMH of ischemic CM s/p LVAD who presented to the ED with complaints of dizziness, weakness, hematochezia. Patient admitted to Medicine for further evaluation and care.     Hematochezia  Acute on chronic anemia  Supratherapeutic INR  Patient presented to ED for evaluation of hematochezia as directed by cardiology team.  This is ongoing for about 1 week prior to presentation.  Supratherapeutic therapeutic INR on admission with INR of 3.61.  INR goal 2-3 for LVAD. Iron low, TIBC low, Ferritin WNL.  2 large bore Ivs placed. Hemoglobin on presentation 10.3, down to 9.3 this morning, though stable over last 36 hours. INR rising to 4.76 this morning despite Warfarin on hold.   -- GI consulted, appreciate recs   > Flexible Sigmoidoscopy completed 2/5 showing radiation proctitis with no treatable lesion.    > Sucralfate enemas 2g BID x4 weeks (can discontinue sooner if bleeding resolves)  -- Given ongoing rising INR, will continue to monitor in hospital  -- Pharmacy to dose warfarin for INR goal 2-3; currently on hold  -- q12 Hgb    Ischemic cardiomyopathy 2/2 cardiac arrest (2023)   S/p LVAD   HTN with episodes of hypotension   Cardiology clinic visit 2/3 with Dr. Cruz, noting concern for GI bleed, ongoing goals to maintain appropriate fluid intake, cardiac rehab participation, and reporting med compliance. Noted to have labile BPs outpatient with decreasing his antihypertensives over the last few months. PTA on Amlodipine, Hydralazine, Digoxin, and Amiodarone. On Warfarin for anticoag with supratherapeutic therapeutic INR on admission with INR of 3.61.  INR goal 2-3 for LVAD.  -- Cardiology II routine consult placed, appreciate recs  -- Given ongoing rising INR, will continue to monitor  in hospital  -- Pharmacy to dose warfarin for INR goal 2-3; currently on hold  -- HOLD Amlodipine and Hydralazine given dizziness and bleeding.   -- Continue PTA digoxin 62.5 mcg daily   -- Continue PTA amiodarone 200 mg daily  -- Daily weights    Depression  -- Continue PTA escitalopram 10 mg daily    HLD  -- Continue PTA atorvastatin 80 mg nightly    C/f Mild cognitive dysfunction  Noted during admission in 10/24 to have mild cognitive dysfunction and possible Alzheimers disease  -- Delirium precautions   Avoid benzodiazepines, antihistamines, anticholinergics if able.  Lights on and blinds open during the day.  Reorient frequently.  Lights & TV off during the night.  Promote normal circadian rhythm.  Limit sensory deprivation - utilize hearing aids, glasses, etc.  Frequently assess basic needs such as temperature, elimination, thirst/hunger, pain       Diet: Regular Diet Adult    DVT Prophylaxis: Pneumatic Compression Devices  Michael Catheter: Not present  Lines: None     Cardiac Monitoring: ACTIVE order. Indication: presyncope  Code Status: Full Code      Clinically Significant Risk Factors Present on Admission         # Hyponatremia: Lowest Na = 132 mmol/L in last 2 days, will monitor as appropriate   # Hypocalcemia: Lowest Ca = 8.5 mg/dL in last 2 days, will monitor and replace as appropriate     # Hypoalbuminemia: Lowest albumin = 3.2 g/dL at 2/6/2025  6:16 AM, will monitor as appropriate  # Drug Induced Coagulation Defect: home medication list includes an anticoagulant medication    # Hypertension: Home medication list includes antihypertensive(s)  # End stage heart failure: Ventricular assist device (VAD) present     # Anemia: based on hgb <11  # Anemia: based on hgb <11           # Financial/Environmental Concerns:     # ICD device present       Social Drivers of Health    Housing Stability: High Risk (2/5/2025)    Housing Stability     Do you have housing? : No     Are you worried about losing your  housing?: No   Tobacco Use: Medium Risk (2/3/2025)    Patient History     Smoking Tobacco Use: Former     Smokeless Tobacco Use: Never     Passive Exposure: Past   Physical Activity: Inactive (11/16/2023)    Exercise Vital Sign     Days of Exercise per Week: 0 days     Minutes of Exercise per Session: 0 min   Social Connections: Unknown (11/16/2023)    Social Connection and Isolation Panel [NHANES]     Marital Status:           Disposition Plan     Medically Ready for Discharge: Anticipated Tomorrow           The patient's care was discussed with the Attending Physician, Dr. Cowan, Bedside Nurse, Patient, Patient's Wife by phone, and Cardiology Consultant(s).    Blanca Johnson PA-C  Hospitalist Service, [address]  Securely message with [employer] (more info)  Text page via [employer] Paging/Directory   See signed in provider for up to date coverage information  ______________________________________________________________________    Interval History   Patient seen at bedside, no acute concerns. No abdominal pain, N/V, tolerating diet well. Feels ready to go home.    Physical Exam   Vital Signs: Temp: 98.1  F (36.7  C) Temp src: Oral BP: (!) 108/91 Pulse: 73   Resp: 17 SpO2: 98 % O2 Device: None (Room air)    Weight: 150 lbs 4.8 oz    Physical Exam  Vitals reviewed.   Constitutional:       General: He is not in acute distress.     Appearance: He is not diaphoretic.   Cardiovascular:      Comments: LVAD  Pulmonary:      Effort: Pulmonary effort is normal.      Breath sounds: No wheezing, rhonchi or rales.   Skin:     General: Skin is warm and dry.   Neurological:      General: No focal deficit present.      Mental Status: He is alert and oriented to person, place, and time.           Medical Decision Making       60 MINUTES SPENT BY ME on the date of service doing chart review, history, exam, documentation & further activities per the note.      Data     I have  personally reviewed the following data over the past 24 hrs:    6.0  \   9.3 (L)   / 193     132 (L) 99 14.1 /  95   4.1 24 0.81 \     ALT: 16 AST: 24 AP: 99 TBILI: 0.5   ALB: 3.2 (L) TOT PROTEIN: 6.7 LIPASE: N/A     INR:  4.76 (H) PTT:  N/A   D-dimer:  N/A Fibrinogen:  N/A     Ferritin:  N/A % Retic:  2.7 (H) LDH:  N/A       Imaging results reviewed over the past 24 hrs:   No results found for this or any previous visit (from the past 24 hours).

## 2025-02-06 NOTE — PHARMACY-ANTICOAGULATION SERVICE
Clinical Pharmacy- Warfarin Discharge Note  This patient is currently on warfarin for the treatment of LVAD.  INR Goal= 2-3  Warfarin PTA Regimen: Per 1/31/25 AC clinic note: 2.5 mg on Sun, Tue, and Friday, and 1.25 mg all other days      Anticoagulation Dose History  More data exists         Latest Ref Rng & Units 12/20/2024 1/6/2025 1/22/2025 1/31/2025 2/4/2025 2/5/2025 2/6/2025   Recent Dosing and Labs   INR 0.85 - 1.15 2.05  2.73  3.77  2.55  3.61  3.31  3.92  4.76       Details          Multiple values from one day are sorted in reverse-chronological order               Vitamin K doses administered during the last 7 days: none  FFP administered during the last 7 days: none     Assessment: patient with supratherapeutic INR in setting of hematochezia. Patient is hemodynamically stable. Per GI no contraindications to restarting AC.     Recommendation:  1) Hold warfarin 2/6PM  2) Re-check INR tomorrow 2/7/25 in clinic  3) Would NOT recommend vitamin K at this time     Silvia Vazquez, AnushaD, BCPS

## 2025-02-06 NOTE — DISCHARGE SUMMARY
[facility]  Hospitalist Discharge Summary      Date of Admission:  2/4/2025  Date of Discharge:  {DISCHARGE DATE:520899}  Discharging Provider: Blanca Johnson PA-C, Dr Cristobal Cowan  Discharge Service: Hospitalist Service, GOLD TEAM 6    ***This note is in preparation for care tomorrow, please refer to today's Hospitalist progress note for most up to date information    Discharge Diagnoses   Hematochezia  Acute on chronic anemia  Supratherapeutic INR  Ischemic cardiomyopathy 2/2 cardiac arrest (2023)   S/p LVAD   HTN with episodes of hypotension   Depression  HLD   C/f Mild cognitive dysfunction      Clinically Significant Risk Factors          Follow-ups Needed After Discharge   {Additional follow-up instructions/to-do's for PCP    :***}    Unresulted Labs Ordered in the Past 30 Days of this Admission       Date and Time Order Name Status Description    2/5/2025  2:09 PM Bld morphology pathology review In process         These results will be followed up by Hospital medicine pool     Discharge Disposition   Discharged to home  Condition at discharge: Stable    Hospital Course   Duane C Johnson is a 75 year old male admitted on 2/4/2025. He has PMH of ischemic CM s/p LVAD who presented to the ED with complaints of dizziness, weakness, hematochezia. Patient admitted to Medicine for further evaluation and care.     Hematochezia  Acute on chronic anemia  Supratherapeutic INR  Patient presented to ED for evaluation of hematochezia as directed by cardiology team.  This is ongoing for about 1 week prior to presentation.  Supratherapeutic therapeutic INR on admission with INR of 3.61.  INR goal 2-3 for LVAD. Iron low, TIBC low, Ferritin WNL.  2 large bore Ivs placed. Hemoglobin on presentation 10.3, down to 9.3 this morning, though stable over last 36 hours. INR rising to 4.76 this morning despite Warfarin on hold.   -- GI consulted, appreciate recs   > Flexible Sigmoidoscopy  completed 2/5 showing radiation proctitis with no treatable lesion.    > Sucralfate enemas 2g BID x4 weeks (can discontinue sooner if bleeding resolves)  -- Given ongoing rising INR, will continue to monitor in hospital  -- Pharmacy to dose warfarin for INR goal 2-3; currently on hold  -- q12 Hgb    Ischemic cardiomyopathy 2/2 cardiac arrest (2023)   S/p LVAD   HTN with episodes of hypotension   Cardiology clinic visit 2/3 with Dr. Cruz, noting concern for GI bleed, ongoing goals to maintain appropriate fluid intake, cardiac rehab participation, and reporting med compliance. Noted to have labile BPs outpatient with decreasing his antihypertensives over the last few months. PTA on Amlodipine, Hydralazine, Digoxin, and Amiodarone. On Warfarin for anticoag with supratherapeutic therapeutic INR on admission with INR of 3.61.  INR goal 2-3 for LVAD. Cardiology II routine consult placed. INR continued to rise over the course of admission despite holding   -- Pharmacy to dose warfarin for INR goal 2-3; currently on hold  -- HOLD Amlodipine and Hydralazine given dizziness and bleeding. ***  -- Continue PTA digoxin 62.5 mcg daily   -- Continue PTA amiodarone 200 mg daily    Depression  -- Continue PTA escitalopram 10 mg daily    HLD  -- Continue PTA atorvastatin 80 mg nightly    C/f Mild cognitive dysfunction  Noted during admission in 10/24 to have mild cognitive dysfunction and possible Alzheimers disease  -- Delirium precautions taken during admission       Diet: Regular Diet Adult    DVT Prophylaxis: Pneumatic Compression Devices  Michael Catheter: Not present  Lines: None     Cardiac Monitoring: ACTIVE order. Indication: presyncope  Code Status: Full Code      Consultations This Hospital Stay   PHARMACY TO DOSE WARFARIN  CARDIOLOGY HEART FAILURE (HF) IP CONSULT  GI LUMINAL ADULT IP CONSULT  CARE MANAGEMENT / SOCIAL WORK IP CONSULT  CARE MANAGEMENT / SOCIAL WORK IP CONSULT    Code Status   Full Code    Time Spent on this  Encounter   I, Blanca Johnson PA-C, personally saw the patient today and spent greater than 30 minutes discharging this patient.       Blanca Johnson PA-C  [facility] [address] [address]  Phone: [phone]  ______________________________________________________________________    Physical Exam   Vital Signs: Temp: 98.1  F (36.7  C) Temp src: Oral   Pulse: 76   Resp: 17 SpO2: 97 % O2 Device: None (Room air)    Weight: 150 lbs 4.8 oz  Physical Exam         Primary Care Physician   Jose Coles    Discharge Orders      CBC with platelets     INR       Significant Results and Procedures   {Data for Discharge Summary:251708}    Discharge Medications   Current Discharge Medication List        START taking these medications    Details   sucralfate (CARAFATE) 1 GM/10ML suspension Place 20 mLs (2 g) rectally 2 times daily for 27 days. Mix 20mLs (2g) with 10mLs of tap water and place rectally two times a day for 4 weeks -- you may stop sooner if bleeding completely resolves  Qty: 1080 mL, Refills: 0    Associated Diagnoses: Hematochezia           CONTINUE these medications which have CHANGED    Details   digoxin (LANOXIN) 62.5 MCG tablet Take 1 tablet (62.5 mcg) by mouth daily.  Qty: 90 tablet, Refills: 0    Associated Diagnoses: Atrial fibrillation, unspecified type (H)           CONTINUE these medications which have NOT CHANGED    Details   acetaminophen (TYLENOL) 325 MG tablet Take 2 tablets (650 mg) by mouth every 4 hours as needed for other (For optimal non-opioid multimodal pain management to improve pain control.).    Associated Diagnoses: LVAD (left ventricular assist device) present (H)      amiodarone (PACERONE) 200 MG tablet Take 1 tablet (200 mg) by mouth daily.  Qty: 90 tablet, Refills: 3    Associated Diagnoses: Transient hypotension; LVAD (left ventricular assist device) present (H); Other specified hypotension; Anticoagulated on warfarin      amLODIPine (NORVASC) 10  MG tablet Take 0.5 tablets (5 mg) by mouth daily.  Qty: 45 tablet, Refills: 3    Associated Diagnoses: Transient hypotension; LVAD (left ventricular assist device) present (H); Other specified hypotension; Anticoagulated on warfarin      atorvastatin (LIPITOR) 80 MG tablet Take 1 tablet (80 mg) by mouth every evening.  Qty: 90 tablet, Refills: 3    Associated Diagnoses: Transient hypotension; LVAD (left ventricular assist device) present (H); Other specified hypotension; Anticoagulated on warfarin      escitalopram (LEXAPRO) 10 MG tablet Take 1 tablet (10 mg) by mouth or Feeding Tube daily.  Qty: 90 tablet, Refills: 3    Associated Diagnoses: LVAD (left ventricular assist device) present (H)      gabapentin (NEURONTIN) 100 MG capsule Take 1 capsule (100 mg) by mouth or Feeding Tube at bedtime.  Qty: 90 capsule, Refills: 3    Associated Diagnoses: LVAD (left ventricular assist device) present (H)      hydrALAZINE (APRESOLINE) 25 MG tablet Take 1 tablet (25 mg) by mouth 2 times daily.  Qty: 180 tablet, Refills: 3    Associated Diagnoses: Transient hypotension; LVAD (left ventricular assist device) present (H); Other specified hypotension; Anticoagulated on warfarin      magnesium hydroxide (MILK OF MAGNESIA) 400 MG/5ML suspension Take 30 mLs by mouth daily as needed for constipation (Use if polyethylene glycol (Miralax) is not effective after 24 hours.).  Qty: 354 mL, Refills: 0    Associated Diagnoses: Transient hypotension; LVAD (left ventricular assist device) present (H); Other specified hypotension; Anticoagulated on warfarin      magnesium oxide (MAG-OX) 400 MG tablet Take 1 tablet (400 mg) by mouth daily.  Qty: 90 tablet, Refills: 3    Associated Diagnoses: Hypomagnesemia      melatonin 10 MG TABS tablet Take 1 tablet (10 mg) by mouth every evening.    Associated Diagnoses: LVAD (left ventricular assist device) present (H)      multivitamin w/minerals (THERA-VIT-M) tablet Take 1 tablet by mouth daily.  Qty: 90  tablet, Refills: 3    Associated Diagnoses: History of left ventricular assist device (LVAD) (H)      warfarin ANTICOAGULANT (COUMADIN) 2.5 MG tablet Take 1 tablet (2.5 mg) by mouth every evening.  Qty: 90 tablet, Refills: 3    Associated Diagnoses: Anticoagulated on warfarin           Allergies   Allergies   Allergen Reactions    Brilinta [Ticagrelor] Other (See Comments) and Difficulty breathing     Per pt and spouse, hyperventilation.    Clonazepam Other (See Comments)     Per spouse, acted like a zombie and he was shaky, could barely talk.      mouth or Feeding Tube daily.  Qty: 90 tablet, Refills: 3    Associated Diagnoses: LVAD (left ventricular assist device) present (H)      gabapentin (NEURONTIN) 100 MG capsule Take 1 capsule (100 mg) by mouth or Feeding Tube at bedtime.  Qty: 90 capsule, Refills: 3    Associated Diagnoses: LVAD (left ventricular assist device) present (H)      hydrALAZINE (APRESOLINE) 25 MG tablet Take 1 tablet (25 mg) by mouth 2 times daily.  Qty: 180 tablet, Refills: 3    Associated Diagnoses: Transient hypotension; LVAD (left ventricular assist device) present (H); Other specified hypotension; Anticoagulated on warfarin      magnesium hydroxide (MILK OF MAGNESIA) 400 MG/5ML suspension Take 30 mLs by mouth daily as needed for constipation (Use if polyethylene glycol (Miralax) is not effective after 24 hours.).  Qty: 354 mL, Refills: 0    Associated Diagnoses: Transient hypotension; LVAD (left ventricular assist device) present (H); Other specified hypotension; Anticoagulated on warfarin      magnesium oxide (MAG-OX) 400 MG tablet Take 1 tablet (400 mg) by mouth daily.  Qty: 90 tablet, Refills: 3    Associated Diagnoses: Hypomagnesemia      melatonin 10 MG TABS tablet Take 1 tablet (10 mg) by mouth every evening.    Associated Diagnoses: LVAD (left ventricular assist device) present (H)      multivitamin w/minerals (THERA-VIT-M) tablet Take 1 tablet by mouth daily.  Qty: 90 tablet, Refills: 3    Associated Diagnoses: History of left ventricular assist device (LVAD) (H)           Allergies   Allergies   Allergen Reactions    Brilinta [Ticagrelor] Other (See Comments) and Difficulty breathing     Per pt and spouse, hyperventilation.    Clonazepam Other (See Comments)     Per spouse, acted like a zombie and he was shaky, could barely talk.

## 2025-02-06 NOTE — PROVIDER NOTIFICATION
Paged Opal Johnson @ 2100: Patient's flow 2.6-2.7. Map 82. Patient states he feels normal, no alarms, just noticing when I was doing his LVAD # intake. Thank you.    Response: Provider aware. No further orders.

## 2025-02-06 NOTE — PLAN OF CARE
Neuro: A&Ox4.   Cardiac: SR. VSS. HM3    Respiratory: Sating 98 on RA.  GI/: Adequate urine output. BM X2 loose and bloody. Provider aware.   Diet/appetite: Tolerating regular diet. Eating well.  Activity:  SBA /IND up to chair and in halls.  Pain: denies   Skin: No new deficits noted.  LDA's:  PIV - SL     Plan: Continue with POC. Notify primary team with changes.

## 2025-02-07 VITALS
RESPIRATION RATE: 18 BRPM | DIASTOLIC BLOOD PRESSURE: 91 MMHG | TEMPERATURE: 98 F | OXYGEN SATURATION: 96 % | SYSTOLIC BLOOD PRESSURE: 108 MMHG | HEART RATE: 76 BPM | BODY MASS INDEX: 23.86 KG/M2 | WEIGHT: 147.8 LBS

## 2025-02-07 LAB
ALBUMIN SERPL BCG-MCNC: 3.4 G/DL (ref 3.5–5.2)
ALP SERPL-CCNC: 108 U/L (ref 40–150)
ALT SERPL W P-5'-P-CCNC: 16 U/L (ref 0–70)
ANION GAP SERPL CALCULATED.3IONS-SCNC: 11 MMOL/L (ref 7–15)
AST SERPL W P-5'-P-CCNC: 23 U/L (ref 0–45)
BILIRUB SERPL-MCNC: 0.5 MG/DL
BUN SERPL-MCNC: 19.9 MG/DL (ref 8–23)
CALCIUM SERPL-MCNC: 8.8 MG/DL (ref 8.8–10.4)
CHLORIDE SERPL-SCNC: 98 MMOL/L (ref 98–107)
CREAT SERPL-MCNC: 0.89 MG/DL (ref 0.67–1.17)
EGFRCR SERPLBLD CKD-EPI 2021: 89 ML/MIN/1.73M2
ERYTHROCYTE [DISTWIDTH] IN BLOOD BY AUTOMATED COUNT: 15.8 % (ref 10–15)
GLUCOSE SERPL-MCNC: 92 MG/DL (ref 70–99)
HCO3 SERPL-SCNC: 24 MMOL/L (ref 22–29)
HCT VFR BLD AUTO: 28.5 % (ref 40–53)
HGB BLD-MCNC: 9.8 G/DL (ref 13.3–17.7)
INR PPP: 2.53 (ref 0.85–1.15)
MCH RBC QN AUTO: 30.4 PG (ref 26.5–33)
MCHC RBC AUTO-ENTMCNC: 34.4 G/DL (ref 31.5–36.5)
MCV RBC AUTO: 89 FL (ref 78–100)
PLATELET # BLD AUTO: 201 10E3/UL (ref 150–450)
POTASSIUM SERPL-SCNC: 4.2 MMOL/L (ref 3.4–5.3)
PROT SERPL-MCNC: 7.2 G/DL (ref 6.4–8.3)
RBC # BLD AUTO: 3.22 10E6/UL (ref 4.4–5.9)
SODIUM SERPL-SCNC: 133 MMOL/L (ref 135–145)
WBC # BLD AUTO: 6.3 10E3/UL (ref 4–11)

## 2025-02-07 PROCEDURE — 85014 HEMATOCRIT: CPT

## 2025-02-07 PROCEDURE — 36415 COLL VENOUS BLD VENIPUNCTURE: CPT

## 2025-02-07 PROCEDURE — 85610 PROTHROMBIN TIME: CPT | Performed by: NURSE PRACTITIONER

## 2025-02-07 PROCEDURE — 250N000013 HC RX MED GY IP 250 OP 250 PS 637: Performed by: NURSE PRACTITIONER

## 2025-02-07 PROCEDURE — 99239 HOSP IP/OBS DSCHRG MGMT >30: CPT | Mod: FS | Performed by: STUDENT IN AN ORGANIZED HEALTH CARE EDUCATION/TRAINING PROGRAM

## 2025-02-07 PROCEDURE — 250N000013 HC RX MED GY IP 250 OP 250 PS 637

## 2025-02-07 PROCEDURE — 80053 COMPREHEN METABOLIC PANEL: CPT

## 2025-02-07 PROCEDURE — 99207 PR APP CREDIT; MD BILLING SHARED VISIT: CPT | Mod: FS

## 2025-02-07 RX ORDER — WARFARIN SODIUM 2.5 MG/1
2.5 TABLET ORAL
Status: DISCONTINUED | OUTPATIENT
Start: 2025-02-07 | End: 2025-02-07 | Stop reason: HOSPADM

## 2025-02-07 RX ORDER — WARFARIN SODIUM 2.5 MG/1
1.25 TABLET ORAL DAILY
Status: ACTIVE | COMMUNITY
Start: 2025-02-07

## 2025-02-07 RX ADMIN — AMIODARONE HYDROCHLORIDE 200 MG: 200 TABLET ORAL at 09:11

## 2025-02-07 RX ADMIN — ESCITALOPRAM OXALATE 10 MG: 10 TABLET ORAL at 09:11

## 2025-02-07 RX ADMIN — HYDRALAZINE HYDROCHLORIDE 25 MG: 25 TABLET ORAL at 09:11

## 2025-02-07 RX ADMIN — SUCRALFATE 2 G: 1 SUSPENSION ORAL at 09:13

## 2025-02-07 RX ADMIN — MAGNESIUM OXIDE TAB 400 MG (241.3 MG ELEMENTAL MG) 400 MG: 400 (241.3 MG) TAB at 09:11

## 2025-02-07 RX ADMIN — DIGOXIN 62.5 MCG: 0.06 TABLET ORAL at 09:11

## 2025-02-07 RX ADMIN — Medication 1 TABLET: at 09:11

## 2025-02-07 RX ADMIN — AMLODIPINE BESYLATE 5 MG: 5 TABLET ORAL at 09:11

## 2025-02-07 ASSESSMENT — ACTIVITIES OF DAILY LIVING (ADL)
ADLS_ACUITY_SCORE: 41

## 2025-02-07 NOTE — PROGRESS NOTES
"Care Coordinator  D/I: bedside RN came to me asking where they get his \"Enema kits\"  I: I clarified with Blanca MOSHER--to needs to mix sulcarafate w water into enema(knows how).  I called  [facility] and they do NOT have enema kits.  I called discharge pharmacy [phone] and they do not have and they are out of Fleets too.  P: Pt/wife will buy Flees enema from  OP pharmacy--Tamiko unclear if they will buy 1 and reuse(get non sterile gloves and wash it or buy new one for each enema.)    "

## 2025-02-07 NOTE — PROGRESS NOTES
Care Management Follow Up    Length of Stay (days): 2    Expected Discharge Date: 02/07/2025     Concerns to be Addressed: no discharge needs identified     Patient plan of care discussed at interdisciplinary rounds: Yes    Anticipated Discharge Disposition: Home              Anticipated Discharge Services:    Anticipated Discharge DME:      Patient/family educated on Medicare website which has current facility and service quality ratings:    Education Provided on the Discharge Plan:    Patient/Family in Agreement with the Plan:      Referrals Placed by CM/SW:    Private pay costs discussed: Not applicable    Discussed  Partnership in Safe Discharge Planning  document with patient/family: Yes: patient     Handoff Completed: No, handoff not indicated or clinically appropriate    Additional Information:  RNCC visited pt bedside, discussed home needs, needs enema kits for 4 weeks, twice per day, no preference for DME company. RNCC has had provider place orders, covering RNCC will look for DME company, send to [facility] or similar, patient will need enemas to go home with him.     Jaylon Aguila BSN, BA, RN, CMSRN, RNCC  [facility]  Covering Units 6C Beds 9658-7283, 6420  Phone: [phone]  Available on [employer] search 6C [phone] RNCC, 6515-19 RNCC  After Hours [phone]     6C  Ph: [phone]     6B/CRNCC Weekend/holiday on [employer] or [phone]     [facility] RNCC ED/5 Ortho/5 Med/Surg [phone]     [facility] RNCC 6 Med/Surg 8A, [phone]     Observation SW and weekend/after hours phone: [phone]

## 2025-02-07 NOTE — PROGRESS NOTES
[facility]    Cardiology Progress Note- Cards 2 Consult Progress Note        Date of Admission:  2025     Assessment & Plan: HVSL   1. End-stage cardiomyopathy s/p HeartMate 3 LVAD 2024 on warfarin  2. Radiation proctitis   3. Obstructive coronary artery disease s/p PCI w/ BRENDA LAD 10/26/23  4. Hx VT/VF arrest s/p ICD   5. AF/AFL   6. Hx prostate adenocarcinoma s/p fiducial and SpaceOAR treated with Lupron injection and pelvic radiation therapy ()     Duane is doing well since his flexible sigmoidoscopy with identified etiology of hematochezia 2/2 radiation proctitis. He is euvolemic on exam with MAP's 80-90 this AM. His VAD parameters are optimized at speed 5000 RPM achieving a Flow of 3.0L, PI's 4.0-7.0. INR remains supra therapeutic at 3.9.     Per GI no contraindications to restarting AC. Holding warfarin tonight. Continuing with goal INR 2.0-3.0. No changes to chronic medical regimen or LVAD parameters.      RECOMMENDATIONS  Pharmacy to dose warfarin for INR goal 2-3 (holding tonight)   Aggressive hydration + salt intake plans as prior (patient aware)   Restart hydralazine and amlodipine when able   Amiodarone 200 mg daily for hx VT, AF  Digoxin 62.5 mcg daily     I personally interrogated the LVAD at the bedside that showed stable parameters with no significant alarms.    Heartmate 3 LEFT VS  Flow (Lpm): 3 Lpm  Pulse Index (PI): 6.5 PI  Speed (rpm): 5000 rpm  Power (jackson): 3.2 jackson  Current Hct settin      I have personally seen and examined the patient on 2025. I saw the patient with the resident (or fellow) and agree with the findings and the plan of care as documented in the resident's (or fellow) note. I have personally reviewed vital signs, medications, laboratory results, available imaging and hemodynamic data.     I spent 37 minutes in care of the patient today including obtaining history, personally reviewing recent  testing and/or lab results, today's examination, discussion of testing results and care recommendations and counseling with patient.    Allison Nicolas MD   of Medicine, [facility]   Advanced Heart Failure and Transplant Cardiology          Clinically Significant Risk Factors         # Hyponatremia: Lowest Na = 132 mmol/L in last 2 days, will monitor as appropriate   # Hypocalcemia: Lowest Ca = 8.5 mg/dL in last 2 days, will monitor and replace as appropriate     # Hypoalbuminemia: Lowest albumin = 3.2 g/dL at 2/6/2025  6:16 AM, will monitor as appropriate  # Coagulation Defect: INR = 4.76 (Ref range: 0.85 - 1.15) and/or PTT = 52 Seconds (Ref range: 22 - 38 Seconds), will monitor for bleeding     # End stage heart failure: Ventricular assist device (VAD) present               # Financial/Environmental Concerns: none   # ICD device present       Social Drivers of Health   Housing Stability: High Risk (2/5/2025)    Housing Stability     Do you have housing? : No     Are you worried about losing your housing?: No   Tobacco Use: Medium Risk (2/3/2025)    Patient History     Smoking Tobacco Use: Former     Smokeless Tobacco Use: Never     Passive Exposure: Past   Physical Activity: Inactive (11/16/2023)    Exercise Vital Sign     Days of Exercise per Week: 0 days     Minutes of Exercise per Session: 0 min   Social Connections: Unknown (11/16/2023)    Social Connection and Isolation Panel [NHANES]     Marital Status:         ______________________________________________________________________    Interval History   NAOE. Feeling same as yesterday.    Physical Exam   Vital Signs: Temp: 98.1  F (36.7  C) Temp src: Oral   Pulse: 75   Resp: 16 SpO2: 97 % O2 Device: None (Room air)    Weight: 150 lbs 4.8 oz  GEN: pleasant, no acute distress  HEENT: No discharge  EYES: no icterus  CV: RRR, normal s1/s2, no murmurs/rubs/s3/s4, no heave. .   CHEST: clear to ausculation bilaterally, no  rales or wheezing  ABD: soft, non-tender, normal active bowel sounds  : no flank/suprapubic tenderness  EXTR: No clubbing, cyanosis or edema.   NEURO: alert oriented, speech fluent/appropriate, motor grossly nonfocal  PSYCH: cooperative, affect appropriate, pleasant    Data     I have personally reviewed the following data over the past 24 hrs:    6.0  \   9.4 (L)   / 193     132 (L) 99 14.1 /  95   4.1 24 0.81 \     ALT: 16 AST: 24 AP: 99 TBILI: 0.5   ALB: 3.2 (L) TOT PROTEIN: 6.7 LIPASE: N/A     INR:  4.76 (H) PTT:  N/A   D-dimer:  N/A Fibrinogen:  N/A       Imaging results reviewed over the past 24 hrs:   No results found for this or any previous visit (from the past 24 hours).

## 2025-02-07 NOTE — PHARMACY-ANTICOAGULATION SERVICE
Clinical Pharmacy- Warfarin Discharge Note  This patient is currently on warfarin for the treatment of LVAD.  INR Goal= 2-3  Expected length of therapy lifetime.    Warfarin PTA Regimen: Per 1/31/25 AC clinic note: 2.5 mg on Sun, Tue, and Friday, and 1.25 mg all other days      Anticoagulation Dose History  More data exists         Latest Ref Rng & Units 1/6/2025 1/22/2025 1/31/2025 2/4/2025 2/5/2025 2/6/2025 2/7/2025   Recent Dosing and Labs   INR 0.85 - 1.15 2.73  3.77  2.55  3.61  3.31  3.92  4.76  2.53       Details          Multiple values from one day are sorted in reverse-chronological order               Vitamin K doses administered during the last 7 days: none  FFP administered during the last 7 days: none    Assessment: patient with supratherapeutic INR in setting of hematochezia. Patient is hemodynamically stable. Per GI no contraindications to restarting AC. Warfarin has been held 3 days this hospitalization, with peak INR of 4.76 on 2/6/25. Today, INR has dropped in to therapeutic range Will re-start warfarin dosing with 2.5 mg today to prevent further drop in INR.     Recommendations:  1) Give warfarin 2.5 mg today  2) Give warfarin 1.25 mg all other days  3) Patient should have next INR checked no later than Monday, 2/10/2025.     Silvia Vazquez, AnushaD, BCPS

## 2025-02-07 NOTE — PLAN OF CARE
Neuro: A&Ox4.   Cardiac: SR. VSS. HM3.    Respiratory: Sating 98 on RA.  GI/: Adequate urine output. BM X1  Diet/appetite: Tolerating regular diet. Eating well.  Activity:  SBA / IND with walker. up to chair and in halls.  Pain: denies   Skin: LVAD dressing changed 2/7  LDA's:  PIV - removed   Plan: education provided to wife and pt regarding sucralfate enemas. Continue with POC. Notify primary team with changes.      DISCHARGE                           ----------------------------------------------------------------------------  Discharged to: Home  Via: private transportation  Accompanied by: Family - wife   Discharge Instructions: *diet, *activity, medications, follow up appointments, when to call the MD, aftercare instructions.  Prescriptions: To be filled by pharmacy; medication list reviewed & sent with pt  Follow Up Appointments: arranged; information given  Belongings: All sent with pt  IV: d/c'd  Telemetry: d/c'd  Pt exhibits understanding of above discharge instructions; all questions answered.    Discharge Paperwork: Signed, copied, and sent home with patient.

## 2025-02-07 NOTE — PLAN OF CARE
Neuro: A&Ox4.   Cardiac: SR. VSS.   Respiratory: Sating * on RA.  GI/: Adequate urine output. BM X1  Diet/appetite: Tolerating * diet. Eating well.  Activity:  Assist of *, up to chair and in halls.  Pain: At acceptable level on current regimen.   Skin: No new deficits noted.  LDA's:    Plan: Continue with POC. Notify primary team with changes.    Goal Outcome Evaluation:      Plan of Care Reviewed With: patient    Overall Patient Progress: improvingOverall Patient Progress: improving    Outcome Evaluation: LVAD HM3    Problem: Ventricular Assist Device  Goal: Optimal Adjustment to Device  Outcome: Progressing  Goal: Absence of Bleeding  Outcome: Progressing  Goal: Optimal Device Function  Outcome: Progressing  Goal: Absence of Embolism Signs and Symptoms  Outcome: Progressing  Goal: Optimal Functional Ability  Outcome: Progressing  Intervention: Optimize Functional Ability  Recent Flowsheet Documentation  Taken 2/6/2025 2300 by Ric Mederos, RN  Activity Management:   activity adjusted per tolerance   ambulated to bathroom   ambulated outside room  Taken 2/6/2025 2018 by Ric Mederos RN  Activity Management:   activity adjusted per tolerance   ambulated to bathroom   ambulated outside room  Goal: Optimal Blood Flow  Outcome: Progressing  Goal: Absence of Infection Signs and Symptoms  Outcome: Progressing  Intervention: Prevent or Manage Infection  Recent Flowsheet Documentation  Taken 2/6/2025 2300 by Ric Mederos RN  Infection Prevention:   single patient room provided   hand hygiene promoted   equipment surfaces disinfected  Taken 2/6/2025 2018 by Ric Mederos, RN  Infection Prevention:   single patient room provided   hand hygiene promoted   equipment surfaces disinfected

## 2025-02-07 NOTE — PLAN OF CARE
D: pt admitted on 2/4 presented to the ED with complaints of dizziness, weakness, hematochezia. with PMH of ischemic CM s/p LVAD      I: Monitored vitals and assessed pt status.   Changes during this shift:   LVAD HM3,     Neuro: A&Ox4.   Cardiac: SR. VSS. LVAD HM3   Respiratory: on RA.  GI/: Adequate urine output.   Diet/appetite: Regular diet.   Activity:  SBA  Pain: Denies  Skin: No new deficits noted.  LDA's: PIV    Plan: Continue with POC. Notify primary team with changes.    Goal Outcome Evaluation:      Plan of Care Reviewed With: patient    Overall Patient Progress: improvingOverall Patient Progress: improving    Outcome Evaluation: LVAD HM3    Problem: Ventricular Assist Device  Goal: Optimal Adjustment to Device  Outcome: Progressing  Goal: Absence of Bleeding  Outcome: Progressing  Goal: Optimal Device Function  Outcome: Progressing  Goal: Absence of Embolism Signs and Symptoms  Outcome: Progressing  Goal: Optimal Functional Ability  Outcome: Progressing  Intervention: Optimize Functional Ability  Recent Flowsheet Documentation  Taken 2/6/2025 2300 by Ric Mederos RN  Activity Management:   activity adjusted per tolerance   ambulated to bathroom   ambulated outside room  Taken 2/6/2025 2018 by Ric Mederos, RN  Activity Management:   activity adjusted per tolerance   ambulated to bathroom   ambulated outside room  Goal: Optimal Blood Flow  Outcome: Progressing  Goal: Absence of Infection Signs and Symptoms  Outcome: Progressing  Intervention: Prevent or Manage Infection  Recent Flowsheet Documentation  Taken 2/6/2025 2300 by Ric Mederos, RN  Infection Prevention:   single patient room provided   hand hygiene promoted   equipment surfaces disinfected  Taken 2/6/2025 2018 by Ric Mederos RN  Infection Prevention:   single patient room provided   hand hygiene promoted   equipment surfaces disinfected

## 2025-02-10 ENCOUNTER — PATIENT OUTREACH (OUTPATIENT)
Dept: CARE COORDINATION | Facility: CLINIC | Age: 75
End: 2025-02-10

## 2025-02-10 ENCOUNTER — TELEPHONE (OUTPATIENT)
Dept: PHARMACY | Facility: OTHER | Age: 75
End: 2025-02-10

## 2025-02-10 ENCOUNTER — HOSPITAL ENCOUNTER (OUTPATIENT)
Dept: CARDIAC REHAB | Facility: CLINIC | Age: 75
Discharge: HOME | End: 2025-02-10
Attending: FAMILY MEDICINE
Payer: MEDICARE

## 2025-02-10 ENCOUNTER — ANTICOAGULATION THERAPY VISIT (OUTPATIENT)
Dept: ANTICOAGULATION | Facility: CLINIC | Age: 75
End: 2025-02-10

## 2025-02-10 ENCOUNTER — LAB (OUTPATIENT)
Dept: LAB | Facility: CLINIC | Age: 75
End: 2025-02-10
Payer: MEDICARE

## 2025-02-10 ENCOUNTER — CARE COORDINATION (OUTPATIENT)
Dept: CARDIOLOGY | Facility: CLINIC | Age: 75
End: 2025-02-10

## 2025-02-10 VITALS — BODY MASS INDEX: 25.18 KG/M2 | WEIGHT: 156 LBS

## 2025-02-10 DIAGNOSIS — I48.91 ATRIAL FIBRILLATION, UNSPECIFIED TYPE (H): ICD-10-CM

## 2025-02-10 DIAGNOSIS — I95.89 OTHER SPECIFIED HYPOTENSION: ICD-10-CM

## 2025-02-10 DIAGNOSIS — Z95.811 HISTORY OF LEFT VENTRICULAR ASSIST DEVICE (LVAD) (H): ICD-10-CM

## 2025-02-10 DIAGNOSIS — D64.9 ANEMIA, UNSPECIFIED TYPE: ICD-10-CM

## 2025-02-10 DIAGNOSIS — Z79.01 ANTICOAGULATED ON WARFARIN: ICD-10-CM

## 2025-02-10 DIAGNOSIS — I50.42 CHRONIC COMBINED SYSTOLIC AND DIASTOLIC HEART FAILURE (H): ICD-10-CM

## 2025-02-10 DIAGNOSIS — I50.22 CHRONIC SYSTOLIC CONGESTIVE HEART FAILURE (H): Primary | ICD-10-CM

## 2025-02-10 DIAGNOSIS — Z95.811 LVAD (LEFT VENTRICULAR ASSIST DEVICE) PRESENT (H): ICD-10-CM

## 2025-02-10 DIAGNOSIS — K92.2 LOWER GI BLEEDING: ICD-10-CM

## 2025-02-10 LAB
ERYTHROCYTE [DISTWIDTH] IN BLOOD BY AUTOMATED COUNT: 15.6 % (ref 10–15)
HCT VFR BLD AUTO: 27.4 % (ref 40–53)
HGB BLD-MCNC: 9.1 G/DL (ref 13.3–17.7)
INR PPP: 1.59 (ref 0.85–1.15)
MCH RBC QN AUTO: 30.1 PG (ref 26.5–33)
MCHC RBC AUTO-ENTMCNC: 33.2 G/DL (ref 31.5–36.5)
MCV RBC AUTO: 91 FL (ref 78–100)
PLATELET # BLD AUTO: 229 10E3/UL (ref 150–450)
RBC # BLD AUTO: 3.02 10E6/UL (ref 4.4–5.9)
WBC # BLD AUTO: 6.5 10E3/UL (ref 4–11)

## 2025-02-10 PROCEDURE — 85610 PROTHROMBIN TIME: CPT

## 2025-02-10 PROCEDURE — 85027 COMPLETE CBC AUTOMATED: CPT

## 2025-02-10 PROCEDURE — 36415 COLL VENOUS BLD VENIPUNCTURE: CPT

## 2025-02-10 PROCEDURE — 93798 PHYS/QHP OP CAR RHAB W/ECG: CPT

## 2025-02-10 ASSESSMENT — ACTIVITIES OF DAILY LIVING (ADL): DEPENDENT_IADLS:: INDEPENDENT

## 2025-02-10 NOTE — PROGRESS NOTES
Situation:   Called and spoke with Patient, Family to follow up after recent hospitalization.     Background:  Patient was recently admitted from 2/4/25 to 2/7/25, for lower GI bleed, supra therapeutic INR showing, scope showed signs of radiation proctitis with small areas of bleeding.    Assessment:  Patient states since discharge they are feeling okay, getting dizzy at times. Says he's been doing enemas, has seen bleeding maybe slow down a little bit. Denies lvad alarms.  Answered questions regarding their care and discharge plan.  Patient/Caregiver state understanding of needing to call dressing supply company to get supplies ordered for LVAD drive line exit site  Reviewed medications and ensured that they picked up their new medications : sulcrafalfate enemas  Reviewed any updates to INR goal and ensured anticoagulation clinic referral is correct.   Weight today: 156lb   Compared to recent values this weight is remained the same.   VAD Parameters:    02/10/25 1301   Heartmate 3 LEFT VS   Flow (Lpm) 3.6 Lpm   Pulse Index (PI) 3.8 PI   Speed (rpm) 5000 rpm   Power (jackson) 3.1 jackson          NIBP/MAP: has not gotten it, requested he check it daily while he is having dizziness.   Symptoms:   Heart failure symptoms: denies.    Recommendation:  Patient verbalized understanding and will call with further questions concerns.  CBC due today. RTC for cardiology 3/18/25. Due to see PCP within a week of discharge, encouraged patient to call and make this appointment.

## 2025-02-10 NOTE — PROGRESS NOTES
Clinic Care Coordination Contact  Care Coordination Clinician Chart Review    Situation: Patient chart reviewed by care coordinator.    Background: Clinic Care Coordination Referral received from inpatient care team for transition handoff communication following hospital admission.    Assessment: Upon chart review, patient is not a candidate for Primary Care Clinic Care Coordination enrollment due to reason stated below:  Primary Care Clinic Care Coordination will defer follow-up outreach to Specialty Clinic Care Coordination team who are already closely following patient.    Plan/Recommendations: Clinic Care Coordination Referral/order cancelled. RN/SW CC will perform no further monitoring/outreaches at this time and will remain available as needed. If new needs arise, a new Care Coordination Referral may be placed.    [facility]   Ruthy Paiz RN, Care Coordinator   [facility]'s   E-mail [email or web address]   [phone]

## 2025-02-10 NOTE — TELEPHONE ENCOUNTER
MTM referral from: Transitions of Care (recent hospital discharge, TCU discharge, or ED visit)    MTM referral outreach attempt #1 on February 10, 2025 at 9:54 AM      Outcome: Patient declined to schedule an MTM appointment. He doesn't feel it's necessary.     Use HB for the carrier/Plan on the flowsheet      WESLEY Oconnor

## 2025-02-11 ENCOUNTER — OFFICE VISIT (OUTPATIENT)
Dept: RADIATION THERAPY | Facility: OUTPATIENT CENTER | Age: 75
End: 2025-02-11
Payer: MEDICARE

## 2025-02-11 DIAGNOSIS — C61 PROSTATE CANCER (H): Primary | ICD-10-CM

## 2025-02-11 NOTE — LETTER
2025      Duane C Johnson  [id number] [address] [place] [id number]      Dear Colleague,    Thank you for referring your patient, Duane C Johnson, to the [facility]. Please see a copy of my visit note below.       Department of Radiation Oncology  Radiation Therapy Center  [facility]  [place] [address]  [phone]       Radiation Oncology Follow-up Visit  25    Duane C Johnson  MRN: [id number]   : 1950     Radiation Oncologist: Jasper Vallejo MD  Provider: VIDHI Flores  24    DIAGNOSIS: Localized prostate adenocarcinoma, [place] 4+3, PSA 4.61, unfavorable intermediate risk, grade group 3  INTENT OF RADIOTHERAPY: Definitive  PATHOLOGY:    Prostate, target 2, biopsy:   - Prostatic adenocarcinoma  - Sarah score 7 (4+3)  - Grade group 3  - Extent: Involves 1 of 2 cores (3 mm, 20%)                                 CONCURRENT SYSTEMIC THERAPY:    No      ONCOLOGIC HISTORY:          Duane C Johnson is a 75 year old gentleman with significant cardiovascular disease diagnosed with Sarah 4+3, PSA 4.61 unfavorable intermediate risk prostate cancer referred for definitive management with radiation therapy.     HISTORY OF PRESENT ILLNESS:  Duane C Johnson is a 75 year old gentleman with significant smoking history (.25ppd x 30years, quit 10/26/23) and multiple cardiac comorbidities, recently diagnosed with high volume intermediate risk prostate cancer referred to us by Dr. Ruiz.  He was initially seen by Dr. Ruiz back in  for having an slightly elevated PSA.  Relevant PSA findings of 4.61 on 3/13/23 and  of 3.80 on 24. On 2023 prostate MRI showed 26 gram prostate. PIRADS 4 lesion located at the right mid gland peripheral zone at 7:00 and there is short segment capsular abutment with low likelihood of minimal extraprostatic extension. There are 2 additional PIRADS 4 lesions.  The lesions approach the neurovascular bundles without  direct invasion. No suspicious adenopathy or evidence of pelvic metastases.     On September 12, 2023, Dr. Ruiz completed a biopsy revealing [place] 4+3 high-volume disease. Per Urology NATHALIA revealed normal prostate 20ml prostate.     He initially elected prostatectomy, however had MI on 10/26/23, the day before scheduled surgery.  His surgery was canceled and he has continued to have CHF with multiple hospitalizations and ED visits since. He also has had aspiration pneumonia. Currently he is going to cardiac rehab. In light of recent events deemed to be a poor surgical candidate and recommended to have external beam radiation therapy.     Seen by Dr Youngblood  on 3/5/24. Initiated ADT every 3 months on 3/19/24, second dose 6/11/24. He completed short term ADT. Will see Dr Youngblood prn.      He completed radiation to the  prostate on 6/27/24     SITE OF TREATMENT: Prostate      DATES  OF TREATMENT: 5/14/24 to 6/27/24      TOTAL DOSE OF TREATMENT: 7, 000 cGy     DOSE PER FRACTION OF TREATMENT: 250 cGy x 28 fractions    INTERVAL SINCE COMPLETION OF RADIATION THERAPY:   7 mo    SUBJECTIVE:   Duane returns for 6 mo follow up. He is in a wheelchair today after recent hospitalization. Patient noted blood in stool on 2/3/25, he happened to have a visit with Cardiology that day and told his provider he was weak, dizzy and had blood in stool. Told to go to ED. He presented to ED 2/4/25  Supratherapeutic therapeutic INR on admission with INR of 3.61.  INR goal 2-3 for LVAD. Iron low, TIBC low, Ferritin WNL.  2 large bore Ivs placed. Hemoglobin on presentation 10.3, down to 9.3 this morning, though stable over last 36 hours. INR improved to therapeutic range this morning. GI consulted, flexible Sigmoidoscopy completed 2/5 showing radiation proctitis with no treatable lesion. Sucralfate enemas 2g BID x4 weeks, told he can discontinue sooner if bleeding resolves. He notes some decrease in blood in stool.       PHYSICAL EXAM:  There  were no vitals taken for this visit.  Gen: Alert, in NAD  Eyes: PERRL, EOMI, sclera anicteric  HENT     Head: NC/AT     Ears: No external auricular lesions     Nose/sinus: No rhinorrhea or epistaxis     Oral Cavity/Oropharynx: MMM  Neck: Supple, full ROM, no LAD  Pulm: No wheezing, stridor or respiratory distress  CV: Well-perfused, no cyanosis, no pedal edema  Abdominal: Soft, nontender, nondistended, no hepatomegaly  Back: No step-offs or pain to palpation along the thoracolumbar spine, no CVA tenderness  Musculoskeletal: Normal bulk and tone   Skin: Normal color and turgor  Neurologic: A/Ox3, CN II-XII intact  Psychiatric: Appropriate mood and affect    LABS AND IMAGIN25  0.07  24  <0.01  24  Radiation Therapy  24   Lupron #2  3/19/24  Lupron #1  24  3.80  3/13/23  4.61      25 flexible sigmoidoscopy   Altered vascular, congested and erythematous mucosa                          in the rectum. This is consistent with radiation                          proctitis and is very likely the explanation of the                          patient's BRBPR.                          - Diverticulosis in sigmoid colon   Recommendation:        - Return patient to hospital guo for ongoing care.                          - Resume previous diet today.                          - Sucralfate enemas (2g dissolved in 20 mL water) for                          4 weeks - can repeat if symptoms recur                          - Okay for anticoagulation from GI perspective     IMPRESSION:   Mr. Aguila is a 75 year old male with a localized prostate adenocarcinoma, [place] 4+3, PSA 4.61, unfavorable intermediate risk, grade group 3, was scheduled for prostatectomy but had an MI and thus was deemed a poor surgical candidate. Underwent RT and ST ADT. PSA is undetectable. Tolerated radiation well. Patient noted blood in stool on 2/3/25. He presented to ED 25  Supratherapeutic therapeutic INR on admission with  INR of 3.61.  INR goal 2-3 for LVAD. Iron low, TIBC low, Ferritin WNL.  2 large bore Ivs placed. Hemoglobin on presentation 10.3, down to 9.3 this morning, though stable over last 36 hours. INR improved to therapeutic range this morning. GI consulted, flexible Sigmoidoscopy completed 2/5/25 showing radiation proctitis with no treatable lesion. Sucralfate enemas 2g BID x4 weeks, told he can discontinue sooner if bleeding resolves. He notes some decrease in blood in stool.     PLAN:   Continue sucralfate enema as directed above.   Phone call with me in one week  PSA in 6 months    VIDHI Flores  Department of Radiation Oncology  [facility]          Again, thank you for allowing me to participate in the care of your patient.        Sincerely,        Yara Nicolas PA-C    Electronically signed

## 2025-02-11 NOTE — PROGRESS NOTES
Department of Radiation Oncology  Radiation Therapy Center  [facility] [place] [address]  [phone]       Radiation Oncology Follow-up Visit  25    Duane C Johnson  MRN: [id number]   : 1950     Radiation Oncologist: Jasper Vallejo MD  Provider: VIDHI Flores  24    DIAGNOSIS: Localized prostate adenocarcinoma, [place] 4+3, PSA 4.61, unfavorable intermediate risk, grade group 3  INTENT OF RADIOTHERAPY: Definitive  PATHOLOGY:    Prostate, target 2, biopsy:   - Prostatic adenocarcinoma  - [place] score 7 (4+3)  - Grade group 3  - Extent: Involves 1 of 2 cores (3 mm, 20%)                                 CONCURRENT SYSTEMIC THERAPY:    No      ONCOLOGIC HISTORY:          Duane C Johnson is a 75 year old gentleman with significant cardiovascular disease diagnosed with Sarah 4+3, PSA 4.61 unfavorable intermediate risk prostate cancer referred for definitive management with radiation therapy.     HISTORY OF PRESENT ILLNESS:  Duane C Johnson is a 75 year old gentleman with significant smoking history (.25ppd x 30years, quit 10/26/23) and multiple cardiac comorbidities, recently diagnosed with high volume intermediate risk prostate cancer referred to us by Dr. Ruiz.  He was initially seen by Dr. Ruiz back in  for having an slightly elevated PSA.  Relevant PSA findings of 4.61 on 3/13/23 and  of 3.80 on 24. On 2023 prostate MRI showed 26 gram prostate. PIRADS 4 lesion located at the right mid gland peripheral zone at 7:00 and there is short segment capsular abutment with low likelihood of minimal extraprostatic extension. There are 2 additional PIRADS 4 lesions.  The lesions approach the neurovascular bundles without direct invasion. No suspicious adenopathy or evidence of pelvic metastases.     On 2023, Dr. Ruiz completed a biopsy revealing [place] 4+3 high-volume disease. Per Urology NATHALIA revealed normal prostate 20ml prostate.     He  initially elected prostatectomy, however had MI on 10/26/23, the day before scheduled surgery.  His surgery was canceled and he has continued to have CHF with multiple hospitalizations and ED visits since. He also has had aspiration pneumonia. Currently he is going to cardiac rehab. In light of recent events deemed to be a poor surgical candidate and recommended to have external beam radiation therapy.     Seen by Dr Youngblood  on 3/5/24. Initiated ADT every 3 months on 3/19/24, second dose 6/11/24. He completed short term ADT. Will see Dr Youngblood prn.      He completed radiation to the  prostate on 6/27/24     SITE OF TREATMENT: Prostate      DATES  OF TREATMENT: 5/14/24 to 6/27/24      TOTAL DOSE OF TREATMENT: 7, 000 cGy     DOSE PER FRACTION OF TREATMENT: 250 cGy x 28 fractions    INTERVAL SINCE COMPLETION OF RADIATION THERAPY:   7 mo    SUBJECTIVE:   Duane returns for 6 mo follow up. He is in a wheelchair today after recent hospitalization. Patient noted blood in stool on 2/3/25, he happened to have a visit with Cardiology that day and told his provider he was weak, dizzy and had blood in stool. Told to go to ED. He presented to ED 2/4/25  Supratherapeutic therapeutic INR on admission with INR of 3.61.  INR goal 2-3 for LVAD. Iron low, TIBC low, Ferritin WNL.  2 large bore Ivs placed. Hemoglobin on presentation 10.3, down to 9.3 this morning, though stable over last 36 hours. INR improved to therapeutic range this morning. GI consulted, flexible Sigmoidoscopy completed 2/5 showing radiation proctitis with no treatable lesion. Sucralfate enemas 2g BID x4 weeks, told he can discontinue sooner if bleeding resolves. He notes some decrease in blood in stool.       PHYSICAL EXAM:  There were no vitals taken for this visit.  Gen: Alert, in NAD  Eyes: PERRL, EOMI, sclera anicteric  HENT     Head: NC/AT     Ears: No external auricular lesions     Nose/sinus: No rhinorrhea or epistaxis     Oral Cavity/Oropharynx: MMM  Neck:  Supple, full ROM, no LAD  Pulm: No wheezing, stridor or respiratory distress  CV: Well-perfused, no cyanosis, no pedal edema  Abdominal: Soft, nontender, nondistended, no hepatomegaly  Back: No step-offs or pain to palpation along the thoracolumbar spine, no CVA tenderness  Musculoskeletal: Normal bulk and tone   Skin: Normal color and turgor  Neurologic: A/Ox3, CN II-XII intact  Psychiatric: Appropriate mood and affect    LABS AND IMAGIN25  0.07  24  <0.01  24  Radiation Therapy  24   Lupron #2  3/19/24  Lupron #1  24  3.80  3/13/23  4.61      25 flexible sigmoidoscopy   Altered vascular, congested and erythematous mucosa                          in the rectum. This is consistent with radiation                          proctitis and is very likely the explanation of the                          patient's BRBPR.                          - Diverticulosis in sigmoid colon   Recommendation:        - Return patient to hospital guo for ongoing care.                          - Resume previous diet today.                          - Sucralfate enemas (2g dissolved in 20 mL water) for                          4 weeks - can repeat if symptoms recur                          - Okay for anticoagulation from GI perspective     IMPRESSION:   Mr. Aguila is a 75 year old male with a localized prostate adenocarcinoma, Sarah 4+3, PSA 4.61, unfavorable intermediate risk, grade group 3, was scheduled for prostatectomy but had an MI and thus was deemed a poor surgical candidate. Underwent RT and ST ADT. PSA is undetectable. Tolerated radiation well. Patient noted blood in stool on 2/3/25. He presented to ED 25  Supratherapeutic therapeutic INR on admission with INR of 3.61.  INR goal 2-3 for LVAD. Iron low, TIBC low, Ferritin WNL.  2 large bore Ivs placed. Hemoglobin on presentation 10.3, down to 9.3 this morning, though stable over last 36 hours. INR improved to therapeutic range this morning. GI  consulted, flexible Sigmoidoscopy completed 2/5/25 showing radiation proctitis with no treatable lesion. Sucralfate enemas 2g BID x4 weeks, told he can discontinue sooner if bleeding resolves. He notes some decrease in blood in stool.     PLAN:   Continue sucralfate enema as directed above.   Phone call with me in one week  PSA in 6 months    VIDHI Flores  Department of Radiation Oncology  [facility]

## 2025-02-11 NOTE — PROGRESS NOTES
To clarify documentation, patient's hematochezia was likely exacerbated by supratherapeutic INR on warfarin therapy in the setting of prostate cancer.    Blanca Johnson PA-C, [facility]  Hospitalist Service   [facility]  Securely message with Rhett ANDERSON signed in provider for up to date coverage information

## 2025-02-11 NOTE — PROGRESS NOTES
"ANTICOAGULATION MANAGEMENT     Duane C Johnson 75 year old male is on warfarin with subtherapeutic INR result. (Goal INR 2.0-3.0)    Recent labs: (last 7 days)     02/10/25  1542   INR 1.59*       ASSESSMENT     Source(s): Chart Review and Patient/Caregiver Call     Warfarin doses taken: Warfarin taken as instructed  Diet: No new diet changes identified  Medication/supplement changes: Sucralfate enemas 2g BID x4 weeks (can discontinue sooner if bleeding resolves)      New illness, injury, or hospitalization: Yes: hospitalized 2/4/25 - 2/7/25 for lower GI bleed, anemia, hematochezia, generalized weakness, supra therapeutic INR; flexible sigmoidoscopy was performed 2/5 and showed signs of radiation proctitis with small areas of bleeding   Signs or symptoms of bleeding or clotting: Yes: Duane reports bleeding is \"a little less\" when he does the enemas  Previous result: Supratherapeutic last week with GI bleed  Additional findings: Duane took 1.25 mg of warfarin this AM and will take another 1.25 mg tonight; then he will go back to taking warfarin in the evening again.   Dosing discussed with [facility]       PLAN     Recommended plan for no diet, medication or health factor changes affecting INR     Dosing Instructions: Continue your current warfarin dose with next INR in 3 days   (Melissa states it is best for them to check INR on Friday when he will be at hospital already)   Left maintenance dose the same, just changed days he takes the 2.5 mg doses  Summary  As of 2/10/2025      Full warfarin instructions:  2.5 mg every Mon, Wed, Fri; 1.25 mg all other days   Next INR check:  2/14/2025               Telephone call with spouse, Melissa who verbalizes understanding and agrees to plan and who agrees to plan and repeated back plan correctly  Sent Reniac message with dosing and follow up instructions    Lab visit scheduled    Education provided: Contact [phone] with any changes, questions or concerns.     Plan made with [facility] " Pharmacist Pratima Gabriel and per LVAD protocol    Sultana Do RN  2/10/2025  Anticoagulation Clinic  [facility] for routing messages: p ANTICOAG LVAD  [facility] patient phone line: [phone]        _______________________________________________________________________     Anticoagulation Episode Summary       Current INR goal:  2.0-3.0   TTR:  76.3% (2.8 mo)   Target end date:  Indefinite   Send INR reminders to:  ANTICOAG LVAD    Indications    Chronic systolic congestive heart failure (H) [I50.22]  Anticoagulated on warfarin [Z79.01]  LVAD (left ventricular assist device) present (H) [Z95.811]  Other specified hypotension [I95.89]  Chronic combined systolic and diastolic heart failure (H) [I50.42]             Comments:  Follow VAD Anticoag protocol:Yes: HeartMate 3  Bridging: Enoxaparin  Date VAD placed: 9/18/24             Anticoagulation Care Providers       Provider Role Specialty Phone number    Ham Cruz MD Referring Advanced Heart Failure and Transplant Cardiology [phone]    Sravanthi Asencio MD Referring Cardiovascular Disease [phone]

## 2025-02-12 ENCOUNTER — CARE COORDINATION (OUTPATIENT)
Dept: CARDIOLOGY | Facility: CLINIC | Age: 75
End: 2025-02-12
Payer: MEDICARE

## 2025-02-12 DIAGNOSIS — K92.1 HEMATOCHEZIA: ICD-10-CM

## 2025-02-12 DIAGNOSIS — K92.2 LOWER GI BLEEDING: Primary | ICD-10-CM

## 2025-02-12 DIAGNOSIS — I50.22 CHRONIC SYSTOLIC CONGESTIVE HEART FAILURE (H): ICD-10-CM

## 2025-02-12 DIAGNOSIS — Z95.811 LVAD (LEFT VENTRICULAR ASSIST DEVICE) PRESENT (H): ICD-10-CM

## 2025-02-12 NOTE — PROGRESS NOTES
D: Called patient to follow up.     I/A: Patient has not heard from anyone regarding repeat cbc on Monday- informed patient of result. Discussed with patient that I spoke with Dr. Cruz and that we would like to have him continue to follow with GI team as well as re-check cbc on Friday when he gets another INR.   States MAP 75-85, vad parameters stable.   He did have a fainting episode yesterday, overall a rough day yesterday. Continuing to have bloody stools, states it is maybe slightly better.     P: Advised patient to call GI scheduling if he had not heard form them in 2 days. .   Patient, Family notified to page on-call coordinator if symptoms worsen or with other concerns. Patient, Family verbalized understanding.

## 2025-02-14 ENCOUNTER — HOSPITAL ENCOUNTER (OUTPATIENT)
Dept: CARDIAC REHAB | Facility: CLINIC | Age: 75
Discharge: HOME | End: 2025-02-14
Attending: FAMILY MEDICINE
Payer: MEDICARE

## 2025-02-14 PROCEDURE — 93798 PHYS/QHP OP CAR RHAB W/ECG: CPT

## 2025-02-17 ENCOUNTER — LAB (OUTPATIENT)
Dept: LAB | Facility: CLINIC | Age: 75
End: 2025-02-17
Payer: MEDICARE

## 2025-02-17 ENCOUNTER — CARE COORDINATION (OUTPATIENT)
Dept: CARDIOLOGY | Facility: CLINIC | Age: 75
End: 2025-02-17

## 2025-02-17 ENCOUNTER — ANTICOAGULATION THERAPY VISIT (OUTPATIENT)
Dept: ANTICOAGULATION | Facility: CLINIC | Age: 75
End: 2025-02-17

## 2025-02-17 VITALS — BODY MASS INDEX: 24.69 KG/M2 | WEIGHT: 153 LBS

## 2025-02-17 DIAGNOSIS — I50.22 CHRONIC SYSTOLIC CONGESTIVE HEART FAILURE (H): Primary | ICD-10-CM

## 2025-02-17 DIAGNOSIS — I95.89 OTHER SPECIFIED HYPOTENSION: ICD-10-CM

## 2025-02-17 DIAGNOSIS — K92.2 LOWER GI BLEEDING: ICD-10-CM

## 2025-02-17 DIAGNOSIS — Z95.811 LVAD (LEFT VENTRICULAR ASSIST DEVICE) PRESENT (H): ICD-10-CM

## 2025-02-17 DIAGNOSIS — Z95.811 LVAD (LEFT VENTRICULAR ASSIST DEVICE) PRESENT (H): Primary | ICD-10-CM

## 2025-02-17 DIAGNOSIS — I50.42 CHRONIC COMBINED SYSTOLIC AND DIASTOLIC HEART FAILURE (H): ICD-10-CM

## 2025-02-17 DIAGNOSIS — Z79.01 ANTICOAGULATED ON WARFARIN: ICD-10-CM

## 2025-02-17 DIAGNOSIS — I50.22 CHRONIC SYSTOLIC CONGESTIVE HEART FAILURE (H): ICD-10-CM

## 2025-02-17 LAB
HGB BLD-MCNC: 9 G/DL (ref 13.3–17.7)
INR PPP: 1.59 (ref 0.85–1.15)

## 2025-02-17 PROCEDURE — 85610 PROTHROMBIN TIME: CPT

## 2025-02-17 PROCEDURE — 85018 HEMOGLOBIN: CPT

## 2025-02-17 PROCEDURE — 36415 COLL VENOUS BLD VENIPUNCTURE: CPT

## 2025-02-17 NOTE — PROGRESS NOTES
ANTICOAGULATION MANAGEMENT     Duane C Johnson 75 year old male is on warfarin with subtherapeutic INR result. (Goal INR 2.0-3.0)    Recent labs: (last 7 days)     02/17/25  1559   INR 1.59*       ASSESSMENT     Source(s): Chart Review and Patient/Caregiver Call     Warfarin doses taken: Held for supratherapeutic INR while hospitalized  recently which may be affecting INR  Diet: No new diet changes identified  Medication/supplement changes: None noted  New illness, injury, or hospitalization: Yes:  hospitalized 2/4/25 - 2/7/25 for lower GI bleed, flexible sigmoidoscopy was performed 2/5 and showed signs of radiation proctitis with small areas of bleeding   Signs or symptoms of bleeding or clotting: No  Previous result: Subtherapeutic  Additional findings: None       PLAN     Recommended plan for temporary change(s) affecting INR     Dosing Instructions: booster dose then continue your current warfarin dose with next INR in 1 week       Summary  As of 2/17/2025      Full warfarin instructions:  2/17: 3.75 mg; Otherwise 2.5 mg every Mon, Wed, Fri; 1.25 mg all other days   Next INR check:  2/24/2025               Telephone call with Melissa who agrees to plan and repeated back plan correctly    Lab visit scheduled    Education provided: Goal range and lab monitoring: goal range and significance of current result and Importance of following up at instructed interval  Contact [phone] with any changes, questions or concerns.     Plan made per [facility] anticoagulation protocol and per LVAD protocol    CANDY LOYOLA RN  2/17/2025  Anticoagulation Clinic  [employer] for routing messages: p ANTICOAG LVAD  [facility] patient phone line: [phone]        _______________________________________________________________________     Anticoagulation Episode Summary       Current INR goal:  2.0-3.0   TTR:  70.4% (3 mo)   Target end date:  Indefinite   Send INR reminders to:  ANTICOAG LVAD    Indications    Chronic systolic congestive heart  failure (H) [I50.22]  Anticoagulated on warfarin [Z79.01]  LVAD (left ventricular assist device) present (H) [Z95.811]  Other specified hypotension [I95.89]  Chronic combined systolic and diastolic heart failure (H) [I50.42]             Comments:  Follow VAD Anticoag protocol:Yes: HeartMate 3  Bridging: Enoxaparin  Date VAD placed: 9/18/24             Anticoagulation Care Providers       Provider Role Specialty Phone number    Ham Cruz MD Referring Advanced Heart Failure and Transplant Cardiology [phone]    Sravanthi Asencio MD Referring Cardiovascular Disease [phone]

## 2025-02-17 NOTE — PROGRESS NOTES
Called patient/caregiver to check in 16 weeks post discharge from VAD implant hospitalization.     Pt reports VAD parameters as follows:   Heartmate 3 LEFT VS  Flow (Lpm): 3.8 Lpm  Pulse Index (PI): 2.9 PI  Speed (rpm): 5000 rpm  Power (jackosn): 3.2 jackson    These parameters are at baseline.   Pt states current weight is   Vitals:    02/17/25 1243   Weight: 69.4 kg (153 lb)     which is below baseline.     Pt is not checking MAP's at home. Most recent MAP is 85 yesterday   Reviewed medications and answered any questions.   Patient reports sleeping well, states he is sleeping a lot and has no anxiety since being home with their LVAD.   Patient is able to move around the house and care for himself with assistance- still using walker at home    Has had to sit on the floor 5-6 times int he past week due to weakness/dizziness- feels the same as previous.  Drinking ~120-128oz/day. Eating enough salty snacks. States that the bleeding has stopped now, for a few days. He did skip cardiac rehab last week due to dizziness but will go today.    Discussed specific new problems/stressors since being discharged from the hospital: ongoing issues with dizziness and weakness.      Empathized with patient and reviewed coping strategies: enlisting support from friends and love ones, attending patient and caregiver support groups, reviewing LVAD educational materials to reinforce knowledge, and talking about concerns with family/care providers/trusted others. Encouraged pt to page VAD Coordinator with any issues or questions. Pt verbalizes understanding.

## 2025-02-18 ENCOUNTER — VIRTUAL VISIT (OUTPATIENT)
Dept: RADIATION THERAPY | Facility: OUTPATIENT CENTER | Age: 75
End: 2025-02-18
Payer: MEDICARE

## 2025-02-18 DIAGNOSIS — C61 PROSTATE CANCER (H): Primary | ICD-10-CM

## 2025-02-18 NOTE — PROGRESS NOTES
Department of Radiation Oncology  Radiation Therapy Center  [facility]  [place] [address]  [phone]       Radiation Oncology Follow-up Visit  25    Duane C Johnson  MRN: [id number]   : 1950     Radiation Oncologist: Jasper Vallejo MD  Provider: VIDHI Flores  25    Telephone visit. [place] in [place], provider in clinic    DIAGNOSIS: Localized prostate adenocarcinoma, Sarah 4+3, PSA 4.61, unfavorable intermediate risk, grade group 3  INTENT OF RADIOTHERAPY: Definitive  PATHOLOGY:    Prostate, target 2, biopsy:   - Prostatic adenocarcinoma  - [place] score 7 (4+3)  - Grade group 3  - Extent: Involves 1 of 2 cores (3 mm, 20%)                                 CONCURRENT SYSTEMIC THERAPY:    No      ONCOLOGIC HISTORY:          Duane C Johnson is a 75 year old gentleman with significant cardiovascular disease diagnosed with Sarah 4+3, PSA 4.61 unfavorable intermediate risk prostate cancer referred for definitive management with radiation therapy.     HISTORY OF PRESENT ILLNESS:  Duane C Johnson is a 75 year old gentleman with significant smoking history (.25ppd x 30years, quit 10/26/23) and multiple cardiac comorbidities, recently diagnosed with high volume intermediate risk prostate cancer referred to us by Dr. Ruiz.  He was initially seen by Dr. Ruiz back in  for having an slightly elevated PSA.  Relevant PSA findings of 4.61 on 3/13/23 and  of 3.80 on 24. On 2023 prostate MRI showed 26 gram prostate. PIRADS 4 lesion located at the right mid gland peripheral zone at 7:00 and there is short segment capsular abutment with low likelihood of minimal extraprostatic extension. There are 2 additional PIRADS 4 lesions.  The lesions approach the neurovascular bundles without direct invasion. No suspicious adenopathy or evidence of pelvic metastases.     On 2023, Dr. Ruiz completed a biopsy revealing Sarah 4+3 high-volume disease. Per Urology NATHALIA  revealed normal prostate 20ml prostate.     He initially elected prostatectomy, however had MI on 10/26/23, the day before scheduled surgery.  His surgery was canceled and he has continued to have CHF with multiple hospitalizations and ED visits since. He also has had aspiration pneumonia. Currently he is going to cardiac rehab. In light of recent events deemed to be a poor surgical candidate and recommended to have external beam radiation therapy.     Seen by Dr Youngblood  on 3/5/24. Initiated ADT every 3 months on 3/19/24, second dose 6/11/24. He completed short term ADT. Will see Dr Youngblood prn.      He completed radiation to the  prostate on 6/27/24     SITE OF TREATMENT: Prostate      DATES  OF TREATMENT: 5/14/24 to 6/27/24      TOTAL DOSE OF TREATMENT: 7, 000 cGy     DOSE PER FRACTION OF TREATMENT: 250 cGy x 28 fractions    INTERVAL SINCE COMPLETION OF RADIATION THERAPY:   7 mo    SUBJECTIVE:   Duane returns for one week follow up. I saw him last week and this is a follow up to that. Patient noted blood in stool on 2/3/25, he happened to have a visit with Cardiology that day and told his provider he was weak, dizzy and had blood in stool. Told to go to ED. He presented to ED 2/4/25  Supratherapeutic therapeutic INR on admission with INR of 3.61.  INR goal 2-3 for LVAD. Iron low, TIBC low, Ferritin WNL.  2 large bore Ivs placed. Hemoglobin on presentation 10.3, down to 9.3 this morning, though stable over last 36 hours. INR improved to therapeutic range this morning. GI consulted, flexible sigmoidoscopy completed 2/5 showing radiation proctitis with no treatable lesion. Sucralfate enemas 2g BID x4 weeks, told he can discontinue sooner if bleeding resolves. When is saw him on 2/11/25 the bleeding was much less but not resolved. I told him to continue and I would call him in one week. Today he reports the bleeding has stopped completely since one week ago. He does not recall the last time he saw blood. Wondering if  he should continue or stop sucralfate.       PHYSICAL EXAM:  There were no vitals taken for this visit.  Gen: Alert, in NAD  Neurologic: A/Ox3, CN II-XII intact  Psychiatric: Appropriate mood and affect    LABS AND IMAGIN25  0.07  24  <0.01  24  Radiation Therapy  24   Lupron #2  3/19/24  Lupron #1  24  3.80  3/13/23  4.61      25 flexible sigmoidoscopy   Altered vascular, congested and erythematous mucosa                          in the rectum. This is consistent with radiation                          proctitis and is very likely the explanation of the                          patient's BRBPR.                          - Diverticulosis in sigmoid colon   Recommendation:        - Return patient to hospital guo for ongoing care.                          - Resume previous diet today.                          - Sucralfate enemas (2g dissolved in 20 mL water) for                          4 weeks - can repeat if symptoms recur                          - Okay for anticoagulation from GI perspective     IMPRESSION:   Mr. Aguila is a 75 year old male with a localized prostate adenocarcinoma, Sarah 4+3, PSA 4.61, unfavorable intermediate risk, grade group 3, was scheduled for prostatectomy but had an MI and thus was deemed a poor surgical candidate. Underwent RT and ST ADT. PSA is undetectable. Tolerated radiation well. Patient noted blood in stool on 2/3/25. He presented to ED 25  Supratherapeutic therapeutic INR on admission with INR of 3.61.  INR goal 2-3 for LVAD. Iron low, TIBC low, Ferritin WNL.  2 large bore Ivs placed. Hemoglobin on presentation 10.3, down to 9.3 this morning, though stable over last 36 hours. INR improved to therapeutic range this morning. GI consulted, flexible Sigmoidoscopy completed 25 showing radiation proctitis with no treatable lesion. Sucralfate enemas 2g BID x4 weeks, told he can discontinue sooner if bleeding resolves. Today she reports the  bleeding stopped one week ago.     PLAN:   Can stop sucralfate. Ok to start again if bleeding starts but to let me know.   PSA in 6 months, see me after    VIDHI Flores  Department of Radiation Oncology  [facility]     10 minutes on the phone with the patient.  10:30 to 10: 40. Pt does not have access to a computer

## 2025-02-18 NOTE — LETTER
2025      Duane C Johnson  [address] [address] [place] [id number]      Dear Colleague,    Thank you for referring your patient, Duane C Johnson, to the [facility] RADIATION THERAPY CLINIC. Please see a copy of my visit note below.       Department of Radiation Oncology  Radiation Therapy Center  [facility]  [place] [address]  [phone]       Radiation Oncology Follow-up Visit  25    Duane C Johnson  MRN: [id number]   : 1950     Radiation Oncologist: Jasper Vallejo MD  Provider: VIDHI Flores  25    Telephone visit. Pt in [place], provider in clinic    DIAGNOSIS: Localized prostate adenocarcinoma, Sarah 4+3, PSA 4.61, unfavorable intermediate risk, grade group 3  INTENT OF RADIOTHERAPY: Definitive  PATHOLOGY:    Prostate, target 2, biopsy:   - Prostatic adenocarcinoma  - [place] score 7 (4+3)  - Grade group 3  - Extent: Involves 1 of 2 cores (3 mm, 20%)                                 CONCURRENT SYSTEMIC THERAPY:    No      ONCOLOGIC HISTORY:          Duane C Johnson is a 75 year old gentleman with significant cardiovascular disease diagnosed with Sarah 4+3, PSA 4.61 unfavorable intermediate risk prostate cancer referred for definitive management with radiation therapy.     HISTORY OF PRESENT ILLNESS:  Duane C Johnson is a 75 year old gentleman with significant smoking history (.25ppd x 30years, quit 10/26/23) and multiple cardiac comorbidities, recently diagnosed with high volume intermediate risk prostate cancer referred to us by Dr. Ruiz.  He was initially seen by Dr. Ruiz back in  for having an slightly elevated PSA.  Relevant PSA findings of 4.61 on 3/13/23 and  of 3.80 on 24. On 2023 prostate MRI showed 26 gram prostate. PIRADS 4 lesion located at the right mid gland peripheral zone at 7:00 and there is short segment capsular abutment with low likelihood of minimal extraprostatic extension. There are 2 additional PIRADS 4 lesions.  The  lesions approach the neurovascular bundles without direct invasion. No suspicious adenopathy or evidence of pelvic metastases.     On September 12, 2023, Dr. Ruiz completed a biopsy revealing Sarah 4+3 high-volume disease. Per Urology NATHALIA revealed normal prostate 20ml prostate.     He initially elected prostatectomy, however had MI on 10/26/23, the day before scheduled surgery.  His surgery was canceled and he has continued to have CHF with multiple hospitalizations and ED visits since. He also has had aspiration pneumonia. Currently he is going to cardiac rehab. In light of recent events deemed to be a poor surgical candidate and recommended to have external beam radiation therapy.     Seen by Dr Youngblood  on 3/5/24. Initiated ADT every 3 months on 3/19/24, second dose 6/11/24. He completed short term ADT. Will see Dr Youngblood prn.      He completed radiation to the  prostate on 6/27/24     SITE OF TREATMENT: Prostate      DATES  OF TREATMENT: 5/14/24 to 6/27/24      TOTAL DOSE OF TREATMENT: 7, 000 cGy     DOSE PER FRACTION OF TREATMENT: 250 cGy x 28 fractions    INTERVAL SINCE COMPLETION OF RADIATION THERAPY:   7 mo    SUBJECTIVE:   Duane returns for one week follow up. I saw him last week and this is a follow up to that. Patient noted blood in stool on 2/3/25, he happened to have a visit with Cardiology that day and told his provider he was weak, dizzy and had blood in stool. Told to go to ED. He presented to ED 2/4/25  Supratherapeutic therapeutic INR on admission with INR of 3.61.  INR goal 2-3 for LVAD. Iron low, TIBC low, Ferritin WNL.  2 large bore Ivs placed. Hemoglobin on presentation 10.3, down to 9.3 this morning, though stable over last 36 hours. INR improved to therapeutic range this morning. GI consulted, flexible sigmoidoscopy completed 2/5 showing radiation proctitis with no treatable lesion. Sucralfate enemas 2g BID x4 weeks, told he can discontinue sooner if bleeding resolves. When is saw him on  25 the bleeding was much less but not resolved. I told him to continue and I would call him in one week. Today he reports the bleeding has stopped completely since one week ago. He does not recall the last time he saw blood. Wondering if he should continue or stop sucralfate.       PHYSICAL EXAM:  There were no vitals taken for this visit.  Gen: Alert, in NAD  Neurologic: A/Ox3, CN II-XII intact  Psychiatric: Appropriate mood and affect    LABS AND IMAGIN25  0.07  24  <0.01  24  Radiation Therapy  24   Lupron #2  3/19/24  Lupron #1  24  3.80  3/13/23  4.61      25 flexible sigmoidoscopy   Altered vascular, congested and erythematous mucosa                          in the rectum. This is consistent with radiation                          proctitis and is very likely the explanation of the                          patient's BRBPR.                          - Diverticulosis in sigmoid colon   Recommendation:        - Return patient to hospital guo for ongoing care.                          - Resume previous diet today.                          - Sucralfate enemas (2g dissolved in 20 mL water) for                          4 weeks - can repeat if symptoms recur                          - Okay for anticoagulation from GI perspective     IMPRESSION:   Mr. Aguila is a 75 year old male with a localized prostate adenocarcinoma, [place] 4+3, PSA 4.61, unfavorable intermediate risk, grade group 3, was scheduled for prostatectomy but had an MI and thus was deemed a poor surgical candidate. Underwent RT and ST ADT. PSA is undetectable. Tolerated radiation well. Patient noted blood in stool on 2/3/25. He presented to ED 25  Supratherapeutic therapeutic INR on admission with INR of 3.61.  INR goal 2-3 for LVAD. Iron low, TIBC low, Ferritin WNL.  2 large bore Ivs placed. Hemoglobin on presentation 10.3, down to 9.3 this morning, though stable over last 36 hours. INR improved to therapeutic  range this morning. GI consulted, flexible Sigmoidoscopy completed 2/5/25 showing radiation proctitis with no treatable lesion. Sucralfate enemas 2g BID x4 weeks, told he can discontinue sooner if bleeding resolves. Today she reports the bleeding stopped one week ago.     PLAN:   Can stop sucralfate. Ok to start again if bleeding starts but to let me know.   PSA in 6 months, see me after    VIHDI Flores  Department of Radiation Oncology  [facility]     10 minutes on the phone with the patient.  10:30 to 10: 40. Pt does not have access to a computer         Again, thank you for allowing me to participate in the care of your patient.        Sincerely,        Yara Nicolas PA-C    Electronically signed

## 2025-02-18 NOTE — TELEPHONE ENCOUNTER
Diagnosis, Referred by & from: Radiation Proctitis   Appt date: 2025   NOTES STATUS DETAILS   OFFICE NOTE from referring provider N/A    OFFICE NOTE from other specialist [facility]:  25 - RAD ONC OV with VIDHI Flores - [place]24 - HEM ONC OV with Deana Schuster NP    [facility]:  24 - URO OV with Dr. Causey   DISCHARGE SUMMARY from hospital [facility]:  25 - Admission with Dr. Cowan   DISCHARGE REPORT from the ER N/A    OPERATIVE REPORT N/A    MEDICATION LIST Internal    LABS     ANAL PAP/CEA N/A    BIOPSIES/PATHOLOGY RELATED TO DIAGNOSIS [facility]:  3/23/23 - Colon Biopsy (Case: [id number])   DIAGNOSTIC PROCEDURES     PFC TESTING (from the Pelvic floor center includes Manometry, PDNL, EMG, etc.) N/A    COLONOSCOPY (most recent all time after 5 years) Care Everywhere / [facility]:  3/23/23 - Colonoscopy    [facility]:  08 - Colonoscopy   FLEX SIGMOIDOSCOPY [facility]:  25 - Flexible Sigmoidoscopy   UPPER ENDOSCOPY (EGD) N/A    ERCP N/A    IMAGING (DISC & REPORT)      CT [facility]:  9/3/24 - CT Chest/Abd/Pelvis   MRI [facility]:  2/15/24 - MRI Abdomen  2/15/24 - MRI Pelvis   XRAY [facility]:  24 - XR Abdomen  24 - XR Abdomen  24 - XR Abdomen  * Additional in Epic/PACs   ULTRASOUND  (ENDOANAL/ENDORECTAL) [facility]:  24 - US Abdomen  3/15/23 - US Hernia  3/15/23 - US Abdominal

## 2025-02-19 ENCOUNTER — MYC MEDICAL ADVICE (OUTPATIENT)
Dept: CARDIOLOGY | Facility: CLINIC | Age: 75
End: 2025-02-19
Payer: MEDICARE

## 2025-02-20 ENCOUNTER — ANTICOAGULATION THERAPY VISIT (OUTPATIENT)
Dept: ANTICOAGULATION | Facility: CLINIC | Age: 75
End: 2025-02-20
Payer: MEDICARE

## 2025-02-20 DIAGNOSIS — I95.89 OTHER SPECIFIED HYPOTENSION: ICD-10-CM

## 2025-02-20 DIAGNOSIS — Z95.811 LVAD (LEFT VENTRICULAR ASSIST DEVICE) PRESENT (H): ICD-10-CM

## 2025-02-20 DIAGNOSIS — I50.22 CHRONIC SYSTOLIC CONGESTIVE HEART FAILURE (H): Primary | ICD-10-CM

## 2025-02-20 DIAGNOSIS — Z79.01 ANTICOAGULATED ON WARFARIN: ICD-10-CM

## 2025-02-20 DIAGNOSIS — I50.42 CHRONIC COMBINED SYSTOLIC AND DIASTOLIC HEART FAILURE (H): ICD-10-CM

## 2025-02-20 LAB — INR HOME MONITORING: 2 RATIO (ref 2–3)

## 2025-02-20 NOTE — PROGRESS NOTES
ANTICOAGULATION MANAGEMENT     Duane C Johnson 75 year old male is on warfarin with therapeutic INR result. (Goal INR 2.0-3.0)    Recent labs: (last 7 days)     02/20/25  1121   INR 2       ASSESSMENT     Source(s): Chart Review and Patient/Caregiver Call     Warfarin doses taken: Warfarin taken as instructed  Diet: No new diet changes identified  Medication/supplement changes: None noted  New illness, injury, or hospitalization: No  Signs or symptoms of bleeding or clotting: No--rectal bleeding has stopped and he has stopped sucralfate enemas  Previous result: Subtherapeutic  Additional findings: first home meter result.  Melissa reports Duane is starting to feel better.       PLAN     Recommended plan for no diet, medication or health factor changes affecting INR     Dosing Instructions: Continue your current warfarin dose with next INR in 5 days       Summary  As of 2/20/2025      Full warfarin instructions:  2.5 mg every Mon, Wed, Fri; 1.25 mg all other days   Next INR check:  2/25/2025               Telephone call with spouse, Melissa who verbalizes understanding and agrees to plan and who agrees to plan and repeated back plan correctly    Patient to recheck with home meter    Education provided: [employer] Home monitor result received. Reviewed home monitoring testing expectations:   Ordered INR testing frequency is:  weekly  INR should be tested Monday-Friday prior to 2 pm and submitted directly to home monitoring company on day of testing  [facility] will continue to receive and manage INR results  In clinic lab visits may be periodically required if a home result is an error, INR  > 8, or testing supplies are not available.  Home monitoring services may be discontinue if testing requirements of insurance/home monitoring company are not met.  Responsible group to remain with Arianne Conway team. Will receive future result management calls from this RN team  Epic Health Maintenance Modifier:  "\"Anticoagulation: Home Monitoring\" added to chart       Plan made per [facility] anticoagulation protocol and per LVAD protocol    Sultana Do RN  2/20/2025  Anticoagulation Clinic  [facility] for routing messages: p ANTICOAG LVAD  [facility] patient phone line: [phone]        _______________________________________________________________________     Anticoagulation Episode Summary       Current INR goal:  2.0-3.0   TTR:  68.3% (3.1 mo)   Target end date:  Indefinite   Send INR reminders to:  ANTICOAG LVAD    Indications    Chronic systolic congestive heart failure (H) [I50.22]  Anticoagulated on warfarin [Z79.01]  LVAD (left ventricular assist device) present (H) [Z95.811]  Other specified hypotension [I95.89]  Chronic combined systolic and diastolic heart failure (H) [I50.42]             Comments:  Follow VAD Anticoag protocol:Yes: HeartMate 3  Bridging: Enoxaparin  Date VAD placed: 9/18/24             Anticoagulation Care Providers       Provider Role Specialty Phone number    Ham Cruz MD Referring Advanced Heart Failure and Transplant Cardiology [phone]    Sravanthi Asencio MD Referring Cardiovascular Disease [phone]            "

## 2025-02-21 ENCOUNTER — HOSPITAL ENCOUNTER (OUTPATIENT)
Dept: CARDIAC REHAB | Facility: CLINIC | Age: 75
Discharge: HOME | End: 2025-02-21
Attending: FAMILY MEDICINE
Payer: MEDICARE

## 2025-02-21 PROCEDURE — 93798 PHYS/QHP OP CAR RHAB W/ECG: CPT

## 2025-02-24 ENCOUNTER — ANTICOAGULATION THERAPY VISIT (OUTPATIENT)
Dept: ANTICOAGULATION | Facility: CLINIC | Age: 75
End: 2025-02-24

## 2025-02-24 ENCOUNTER — CARE COORDINATION (OUTPATIENT)
Dept: CARDIOLOGY | Facility: CLINIC | Age: 75
End: 2025-02-24

## 2025-02-24 ENCOUNTER — MYC MEDICAL ADVICE (OUTPATIENT)
Dept: ANTICOAGULATION | Facility: CLINIC | Age: 75
End: 2025-02-24

## 2025-02-24 ENCOUNTER — HOSPITAL ENCOUNTER (OUTPATIENT)
Dept: CARDIAC REHAB | Facility: CLINIC | Age: 75
Discharge: HOME | End: 2025-02-24
Attending: FAMILY MEDICINE
Payer: MEDICARE

## 2025-02-24 ENCOUNTER — LAB (OUTPATIENT)
Dept: LAB | Facility: CLINIC | Age: 75
End: 2025-02-24
Payer: MEDICARE

## 2025-02-24 DIAGNOSIS — Z79.01 ANTICOAGULATED ON WARFARIN: ICD-10-CM

## 2025-02-24 DIAGNOSIS — I50.22 CHRONIC SYSTOLIC CONGESTIVE HEART FAILURE (H): ICD-10-CM

## 2025-02-24 DIAGNOSIS — I50.22 CHRONIC SYSTOLIC CONGESTIVE HEART FAILURE (H): Primary | ICD-10-CM

## 2025-02-24 DIAGNOSIS — I95.89 OTHER SPECIFIED HYPOTENSION: ICD-10-CM

## 2025-02-24 DIAGNOSIS — Z95.811 LVAD (LEFT VENTRICULAR ASSIST DEVICE) PRESENT (H): ICD-10-CM

## 2025-02-24 DIAGNOSIS — K92.2 LOWER GI BLEEDING: ICD-10-CM

## 2025-02-24 DIAGNOSIS — I50.42 CHRONIC COMBINED SYSTOLIC AND DIASTOLIC HEART FAILURE (H): ICD-10-CM

## 2025-02-24 LAB
HGB BLD-MCNC: 9.3 G/DL (ref 13.3–17.7)
INR PPP: 1.86 (ref 0.85–1.15)

## 2025-02-24 PROCEDURE — 36415 COLL VENOUS BLD VENIPUNCTURE: CPT

## 2025-02-24 PROCEDURE — 85610 PROTHROMBIN TIME: CPT

## 2025-02-24 PROCEDURE — 85018 HEMOGLOBIN: CPT

## 2025-02-24 PROCEDURE — 93798 PHYS/QHP OP CAR RHAB W/ECG: CPT

## 2025-02-24 NOTE — PROGRESS NOTES
D: Called patient to check in     I/A: Continuing to feel weak, dizziness is better and has not been passing out. Pt states GI bleed stopped a week ago and he stopped his special enemas.   Requested patient set up f/up with pcp.   Provided phone number for Oral & maxofacial surgery   Requested patient read [employer] message in regards to instructions for RHC in March.        P: Will repeat hgb today.  Patient, Caregiver notified to page on-call coordinator if symptoms worsen or with other concerns. Patient, Caregiver verbalized understanding.

## 2025-02-24 NOTE — PROGRESS NOTES
ANTICOAGULATION MANAGEMENT     Duane C Johnson 75 year old male is on warfarin with subtherapeutic INR result. (Goal INR 2.0-3.0)    Recent labs: (last 7 days)     02/24/25  1445   INR 1.86*       ASSESSMENT     Source(s): Chart Review and Patient/Caregiver Call     Warfarin doses taken: Warfarin taken as instructed  Diet: No new diet changes identified  Medication/supplement changes: rectal bleeding has stopped and he has stopped sucralfate enemas   New illness, injury, or hospitalization: Yes: Hx of GI bleed-hospitalization 2/4/25-2/7/25. Pt is feeling better now  Signs or symptoms of bleeding or clotting: No  Previous result: Therapeutic last visit; previously outside of goal range  Additional findings: New home meter. Pt went to clinic today for INR since another lab was done as well.       PLAN     Recommended plan for ongoing change(s) affecting INR     Dosing Instructions: booster dose then Increase your warfarin dose (10% change) with next INR in 1 week       Summary  As of 2/24/2025      Full warfarin instructions:  2/24: 3.75 mg; Otherwise 1.25 mg every Sun, Tue, Thu; 2.5 mg all other days   Next INR check:  3/3/2025               Telephone call with Sharon and Duane who verbalizes understanding and agrees to plan  Sent [employer] message with dosing and follow up instructions    Patient to recheck with home meter    Education provided: Please call back if any changes to your diet, medications or how you've been taking warfarin  Contact [phone] with any changes, questions or concerns.     Plan made with [facility] Pharmacist Pratima Gabriel and per LVAD protocol    Paulina Martinez, RN  2/24/2025  Anticoagulation Clinic  [facility] for routing messages: susannah MARTINEZ LVJAQUAN  [facility] patient phone line: [phone]        _______________________________________________________________________     Anticoagulation Episode Summary       Current INR goal:  2.0-3.0   TTR:  65.4% (3.2 mo)   Target end date:  Indefinite   Send INR  reminders to:  ANTICOAG LVAD    Indications    Chronic systolic congestive heart failure (H) [I50.22]  Anticoagulated on warfarin [Z79.01]  LVAD (left ventricular assist device) present (H) [Z95.811]  Other specified hypotension [I95.89]  Chronic combined systolic and diastolic heart failure (H) [I50.42]             Comments:  Follow VAD Anticoag protocol:Yes: HeartMate 3  Bridging: Enoxaparin  Date VAD placed: 9/18/24             Anticoagulation Care Providers       Provider Role Specialty Phone number    Ham Cruz MD Referring Advanced Heart Failure and Transplant Cardiology [phone]    Sravanthi Asencio MD Referring Cardiovascular Disease [phone]

## 2025-02-26 ENCOUNTER — HOSPITAL ENCOUNTER (OUTPATIENT)
Dept: CARDIAC REHAB | Facility: CLINIC | Age: 75
Discharge: HOME | End: 2025-02-26
Attending: FAMILY MEDICINE
Payer: MEDICARE

## 2025-02-26 PROCEDURE — 93798 PHYS/QHP OP CAR RHAB W/ECG: CPT

## 2025-02-27 ENCOUNTER — ANTICOAGULATION THERAPY VISIT (OUTPATIENT)
Dept: ANTICOAGULATION | Facility: CLINIC | Age: 75
End: 2025-02-27
Payer: MEDICARE

## 2025-02-27 DIAGNOSIS — Z79.01 ANTICOAGULATED ON WARFARIN: ICD-10-CM

## 2025-02-27 DIAGNOSIS — I95.89 OTHER SPECIFIED HYPOTENSION: ICD-10-CM

## 2025-02-27 DIAGNOSIS — Z95.811 LVAD (LEFT VENTRICULAR ASSIST DEVICE) PRESENT (H): ICD-10-CM

## 2025-02-27 DIAGNOSIS — I50.42 CHRONIC COMBINED SYSTOLIC AND DIASTOLIC HEART FAILURE (H): ICD-10-CM

## 2025-02-27 DIAGNOSIS — I50.22 CHRONIC SYSTOLIC CONGESTIVE HEART FAILURE (H): Primary | ICD-10-CM

## 2025-02-27 LAB — INR HOME MONITORING: 1.9 RATIO (ref 2–3)

## 2025-02-27 NOTE — PROGRESS NOTES
ANTICOAGULATION MANAGEMENT     Duane C Johnson 75 year old male is on warfarin with subtherapeutic INR result. (Goal INR 2.0-3.0)    Recent labs: (last 7 days)     02/27/25  1132   INR 1.9*       ASSESSMENT     Source(s): Chart Review and Patient/Caregiver Call     Warfarin doses taken: Warfarin taken as instructed  Diet: No new diet changes identified  Medication/supplement changes: None noted  New illness, injury, or hospitalization: No  Signs or symptoms of bleeding or clotting: No  Previous result: Subtherapeutic  Additional findings:  Melissa would like to continue with current warfarin dose and recheck an INR on 3/3. Since this is reasonable writer is in agreement.       PLAN     Recommended plan for no diet, medication or health factor changes affecting INR     Dosing Instructions: Continue your current warfarin dose with next INR in 4 days       Summary  As of 2/27/2025      Full warfarin instructions:  1.25 mg every Sun, Tue, Thu; 2.5 mg all other days   Next INR check:  3/3/2025               Telephone call with Melissa who verbalizes understanding and agrees to plan and who agrees to plan and repeated back plan correctly    Patient to recheck with home meter    Education provided: Taking warfarin: Importance of taking warfarin as instructed  Goal range and lab monitoring: goal range and significance of current result, Importance of therapeutic range, and Importance of following up at instructed interval    Plan made per [facility] anticoagulation protocol and per LVAD protocol    Namrata Walker RN  2/27/2025  Anticoagulation Clinic  [employer] for routing messages: p ANTICOAG LVAD  [facility] patient phone line: [phone]        _______________________________________________________________________     Anticoagulation Episode Summary       Current INR goal:  2.0-3.0   TTR:  63.5% (3.3 mo)   Target end date:  Indefinite   Send INR reminders to:  ANTICOAG LVAD    Indications    Chronic systolic congestive heart  failure (H) [I50.22]  Anticoagulated on warfarin [Z79.01]  LVAD (left ventricular assist device) present (H) [Z95.811]  Other specified hypotension [I95.89]  Chronic combined systolic and diastolic heart failure (H) [I50.42]             Comments:  Follow VAD Anticoag protocol:Yes: HeartMate 3  Bridging: Enoxaparin  Date VAD placed: 9/18/24             Anticoagulation Care Providers       Provider Role Specialty Phone number    Ham Cruz MD Referring Advanced Heart Failure and Transplant Cardiology [phone]    Sravanthi Asencio MD Referring Cardiovascular Disease [phone]

## 2025-02-28 ENCOUNTER — HOSPITAL ENCOUNTER (OUTPATIENT)
Dept: CARDIAC REHAB | Facility: CLINIC | Age: 75
Discharge: HOME | End: 2025-02-28
Attending: FAMILY MEDICINE
Payer: MEDICARE

## 2025-02-28 PROCEDURE — 93798 PHYS/QHP OP CAR RHAB W/ECG: CPT | Mod: KX | Performed by: REHABILITATION PRACTITIONER

## 2025-03-03 ENCOUNTER — HOSPITAL ENCOUNTER (OUTPATIENT)
Dept: CARDIAC REHAB | Facility: CLINIC | Age: 75
Discharge: HOME | End: 2025-03-03
Attending: FAMILY MEDICINE
Payer: MEDICARE

## 2025-03-03 DIAGNOSIS — Z95.811 LVAD (LEFT VENTRICULAR ASSIST DEVICE) PRESENT (H): ICD-10-CM

## 2025-03-03 DIAGNOSIS — I50.22 CHRONIC SYSTOLIC CONGESTIVE HEART FAILURE (H): Primary | ICD-10-CM

## 2025-03-03 PROCEDURE — 93798 PHYS/QHP OP CAR RHAB W/ECG: CPT

## 2025-03-03 RX ORDER — LIDOCAINE 40 MG/G
CREAM TOPICAL
OUTPATIENT
Start: 2025-03-03

## 2025-03-05 ENCOUNTER — TELEPHONE (OUTPATIENT)
Dept: ANTICOAGULATION | Facility: CLINIC | Age: 75
End: 2025-03-05
Payer: MEDICARE

## 2025-03-05 NOTE — TELEPHONE ENCOUNTER
ANTICOAGULATION     Duane C Johnson is overdue for an INR check.     Spoke with Duane instructed to test INR with home meter and call results to home monitoring company as soon as possible.    Namrata Walker RN  3/5/2025  Anticoagulation Clinic  [facility] for routing messages: susannah MARTINEZ LVAD  [facility] patient phone line: [phone]

## 2025-03-07 ENCOUNTER — HOSPITAL ENCOUNTER (OUTPATIENT)
Dept: CARDIAC REHAB | Facility: CLINIC | Age: 75
Discharge: HOME | End: 2025-03-07
Attending: SURGERY
Payer: MEDICARE

## 2025-03-07 PROCEDURE — 93798 PHYS/QHP OP CAR RHAB W/ECG: CPT | Mod: KX

## 2025-03-10 ENCOUNTER — HOSPITAL ENCOUNTER (OUTPATIENT)
Dept: CARDIAC REHAB | Facility: CLINIC | Age: 75
Discharge: HOME | End: 2025-03-10
Attending: SURGERY
Payer: MEDICARE

## 2025-03-10 PROCEDURE — 93798 PHYS/QHP OP CAR RHAB W/ECG: CPT | Mod: KX

## 2025-03-12 ENCOUNTER — HOSPITAL ENCOUNTER (OUTPATIENT)
Dept: CARDIAC REHAB | Facility: CLINIC | Age: 75
Discharge: HOME | End: 2025-03-12
Attending: SURGERY
Payer: MEDICARE

## 2025-03-12 PROCEDURE — 93798 PHYS/QHP OP CAR RHAB W/ECG: CPT | Mod: KX

## 2025-03-14 ENCOUNTER — HOSPITAL ENCOUNTER (OUTPATIENT)
Dept: CARDIAC REHAB | Facility: CLINIC | Age: 75
Discharge: HOME | End: 2025-03-14
Attending: SURGERY
Payer: MEDICARE

## 2025-03-14 PROCEDURE — 93798 PHYS/QHP OP CAR RHAB W/ECG: CPT | Mod: KX

## 2025-03-17 ENCOUNTER — HOSPITAL ENCOUNTER (EMERGENCY)
Facility: CLINIC | Age: 75
Discharge: HOME | End: 2025-03-17
Attending: FAMILY MEDICINE | Admitting: FAMILY MEDICINE
Payer: MEDICARE

## 2025-03-17 ENCOUNTER — CARE COORDINATION (OUTPATIENT)
Dept: CARDIOLOGY | Facility: CLINIC | Age: 75
End: 2025-03-17

## 2025-03-17 ENCOUNTER — APPOINTMENT (OUTPATIENT)
Dept: CT IMAGING | Facility: CLINIC | Age: 75
End: 2025-03-17
Attending: FAMILY MEDICINE
Payer: MEDICARE

## 2025-03-17 VITALS — BODY MASS INDEX: 24.32 KG/M2 | WEIGHT: 150.7 LBS

## 2025-03-17 VITALS
TEMPERATURE: 97.8 F | DIASTOLIC BLOOD PRESSURE: 63 MMHG | BODY MASS INDEX: 24.53 KG/M2 | SYSTOLIC BLOOD PRESSURE: 77 MMHG | HEART RATE: 69 BPM | OXYGEN SATURATION: 99 % | RESPIRATION RATE: 18 BRPM | WEIGHT: 152 LBS

## 2025-03-17 DIAGNOSIS — I50.22 CHRONIC SYSTOLIC HEART FAILURE (H): ICD-10-CM

## 2025-03-17 DIAGNOSIS — Z79.01 ANTICOAGULATED ON WARFARIN: ICD-10-CM

## 2025-03-17 DIAGNOSIS — Z95.811 LEFT VENTRICULAR ASSIST DEVICE PRESENT (H): ICD-10-CM

## 2025-03-17 DIAGNOSIS — I50.22 CHRONIC SYSTOLIC CONGESTIVE HEART FAILURE (H): Primary | ICD-10-CM

## 2025-03-17 DIAGNOSIS — Z79.899 ENCOUNTER FOR LONG-TERM (CURRENT) USE OF MEDICATIONS: ICD-10-CM
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
## 2025-03-17 DIAGNOSIS — Z79.899 ON AMIODARONE THERAPY: ICD-10-CM

## 2025-03-17 DIAGNOSIS — Z79.899 LONG TERM USE OF DRUG: ICD-10-CM

## 2025-03-17 DIAGNOSIS — S09.90XA CLOSED HEAD INJURY, INITIAL ENCOUNTER: ICD-10-CM

## 2025-03-17 DIAGNOSIS — Z95.811 LVAD (LEFT VENTRICULAR ASSIST DEVICE) PRESENT (H): ICD-10-CM

## 2025-03-17 LAB
HOLD SPECIMEN: NORMAL
HOLD SPECIMEN: NORMAL
INR HOME MONITORING: 2 RATIO (ref 2–3)
INR PPP: 2.04 (ref 0.85–1.15)

## 2025-03-17 PROCEDURE — 70450 CT HEAD/BRAIN W/O DYE: CPT

## 2025-03-17 PROCEDURE — 36415 COLL VENOUS BLD VENIPUNCTURE: CPT | Performed by: FAMILY MEDICINE

## 2025-03-17 PROCEDURE — 85610 PROTHROMBIN TIME: CPT | Performed by: FAMILY MEDICINE

## 2025-03-17 PROCEDURE — 99284 EMERGENCY DEPT VISIT MOD MDM: CPT | Performed by: FAMILY MEDICINE

## 2025-03-17 PROCEDURE — 99284 EMERGENCY DEPT VISIT MOD MDM: CPT | Mod: 25 | Performed by: FAMILY MEDICINE

## 2025-03-17 ASSESSMENT — ACTIVITIES OF DAILY LIVING (ADL)
ADLS_ACUITY_SCORE: 57
ADLS_ACUITY_SCORE: 57

## 2025-03-17 NOTE — ED PROVIDER NOTES
"  History     Chief Complaint   Patient presents with    Syncope     HPI  Duane C Johnson is a 75 year old male who comes in from home with his wife by private car at the insistence of his LVAD coordinator who wants him to have a CT scan of his brain.  He said that he has frequent episodes of syncope and near syncope since placement of an LVAD device in September with chronically low blood pressures for an ischemic cardiomyopathy.  He is on warfarin for anticoagulation but has not been checking his INRs because he finds the home device difficult to use and the instructions confusing.  Last INR check was 3 and half weeks ago and was 1.9.  Today he got up in his living room and then lost consciousness sat on the ground fell backwards and struck his head against a clock that was leaning up against the wall on the floor.  He broke the glass of the clock.  He aroused without difficulty and was not postictal and has felt well since with no headache no neck pain and no focal neurologic symptoms.  No recent medication changes and no symptoms of recent illness.    Records reviewed:  10/23/24 echocardiogram: \"______________________________________________________________________________  Procedure  LVAD Echocardiogram with two-dimensional, color and spectral Doppler  performed.  ______________________________________________________________________________  Interpretation Summary  LVAD cannula was seen in the expected anatomic position in the LV apex.  HM3 at 5100RPM.  LVIDd 47mm.  Septum normal.  Aortic valve open intermittently. No AI.  Normal flow velocities.  Global right ventricular function is mildly to moderately reduced.  Small circumferential pericardial effusion is present without any hemodynamic  significance.\"      Allergies:  Allergies   Allergen Reactions    Brilinta [Ticagrelor] Other (See Comments) and Difficulty breathing     Per pt and spouse, hyperventilation.    Clonazepam Other (See Comments)     Per " spouse, acted like a zombie and he was shaky, could barely talk.       Problem List:    Patient Active Problem List    Diagnosis Date Noted    Long term (current) use of anticoagulants 02/05/2025     Priority: Medium    Hematochezia 02/05/2025     Priority: Medium    Lower GI bleeding 02/04/2025     Priority: Medium    Generalized weakness 02/04/2025     Priority: Medium    Supratherapeutic INR 02/04/2025     Priority: Medium    Anemia, unspecified type 02/04/2025     Priority: Medium    Chronic combined systolic and diastolic heart failure (H) 11/26/2024     Priority: Medium    Other specified hypotension 10/24/2024     Priority: Medium    Physical deconditioning 10/17/2024     Priority: Medium    History of left ventricular assist device (LVAD) (H) 10/05/2024     Priority: Medium    Chronic systolic congestive heart failure (H) 09/20/2024     Priority: Medium    Anticoagulated on warfarin 09/20/2024     Priority: Medium    Personal history of malignant neoplasm of prostate 09/18/2024     Priority: Medium    Pleural effusion 09/18/2024     Priority: Medium    History of tobacco use 09/18/2024     Priority: Medium    AICD (automatic cardioverter/defibrillator) present 09/18/2024     Priority: Medium    Acute exacerbation of CHF (congestive heart failure) (H) 09/18/2024     Priority: Medium    Atrial fibrillation, unspecified type (H) 09/18/2024     Priority: Medium    LVAD (left ventricular assist device) present (H) 09/18/2024     Priority: Medium    Acute on chronic systolic congestive heart failure (H) 08/30/2024     Priority: Medium    Hypotension 06/11/2024     Priority: Medium    Ischemic cardiomyopathy 04/15/2024     Priority: Medium     25% EF      Anticoagulated 04/15/2024     Priority: Medium     Due to large akinetic area anterior heart       Left inguinal hernia 01/17/2024     Priority: Medium    Atherosclerosis of native coronary artery of native heart with angina pectoris 01/02/2024     Priority:  Medium     anterior STEMI s/p PCI to the pLAD on 10/26/23 with a VT/VF arrest the next day (10/27) with patent stents and unchanged anatomy on repeat angiogram       Anxiety 11/13/2023     Priority: Medium    Systolic CHF (H) 11/07/2023     Priority: Medium    Prostate cancer (H) 09/23/2023     Priority: Medium    Hyperlipidemia LDL goal <130 01/26/2016     Priority: Medium        Past Medical History:    Past Medical History:   Diagnosis Date    Benign essential hypertension     CAD (coronary artery disease)     Chronic systolic heart failure (H)     Hypertension     ST elevation myocardial infarction (STEMI), unspecified artery (H)     Ventricular fibrillation (H)        Past Surgical History:    Past Surgical History:   Procedure Laterality Date    ANESTHESIA CARDIOVERSION N/A 9/5/2024    Procedure: Anesthesia cardioversion;  Surgeon: GENERIC ANESTHESIA PROVIDER;  Location: [facility] OR    ANESTHESIA CARDIOVERSION N/A 9/30/2024    Procedure: Anesthesia cardioversion;  Surgeon: GENERIC ANESTHESIA PROVIDER;  Location: [facility] OR    COLONOSCOPY N/A 3/23/2023    Procedure: COLONOSCOPY, FLEXIBLE, WITH LESION REMOVAL USING SNARE;  Surgeon: Joes Faustin MD;  Location: [facility]    CV CENTRAL VENOUS CATHETER PLACEMENT N/A 10/26/2023    Procedure: Central Venous Catheter Placement;  Surgeon: Rob Lyles MD;  Location: [facility] CARDIAC CATH LAB    CV CORONARY ANGIOGRAM N/A 10/27/2023    Procedure: Coronary Angiogram;  Surgeon: Rob Lyles MD;  Location: [facility] CARDIAC CATH LAB    CV CORONARY ANGIOGRAM N/A 10/26/2023    Procedure: Coronary Angiogram;  Surgeon: Rob Lyles MD;  Location: [facility] CARDIAC CATH LAB    CV LEFT HEART CATH N/A 10/26/2023    Procedure: Left Heart Catheterization;  Surgeon: Rob Lyles MD;  Location: [facility] CARDIAC CATH LAB    CV PCI N/A 10/27/2023    Procedure: Percutaneous Coronary Intervention;  Surgeon: Rob Lyles MD;  Location: [facility]  CARDIAC CATH LAB    CV PCI STENT DRUG ELUTING N/A 10/26/2023    Procedure: Percutaneous Coronary Intervention Stent;  Surgeon: Rob Lyles MD;  Location:  HEART CARDIAC CATH LAB    CV RIGHT HEART CATH MEASUREMENTS RECORDED N/A 5/6/2024    Procedure: Heart Cath Right Heart Cath;  Surgeon: Rob Lyles MD;  Location:  HEART CARDIAC CATH LAB    CV RIGHT HEART CATH MEASUREMENTS RECORDED N/A 9/3/2024    Procedure: Right Heart Catheterization;  Surgeon: Aaron Mesa MD;  Location:  HEART CARDIAC CATH LAB    CV RIGHT HEART CATH MEASUREMENTS RECORDED N/A 10/24/2024    Procedure: Right Heart Catheterization;  Surgeon: Haile rBaden MD;  Location:  HEART CARDIAC CATH LAB    EP ICD INSERT SINGLE N/A 5/8/2024    Procedure: Implantable Cardioverter Defibrillator Device & Lead Implant Dual;  Surgeon: Guero Black MD;  Location:  HEART CARDIAC CATH LAB    INJECTION, HYDROGEL SPACER N/A 4/22/2024    Procedure: INJECTION, HYDROGEL SPACER AND FIDUCIAL MARKER PLACEMENT;  Surgeon: Stanislaw Barros MD;  Location: [facility]    INSERT VENTRICULAR ASSIST DEVICE LEFT (HEARTMATE II) N/A 9/18/2024    Procedure: Median Sternotomy, INSERTION of LEFT VENTRICULAR ASSIST DEVICE (HEARTMATE III), cardiopulmonary bypass, transesophageal echocardiogram performed by anesthesia.;  Surgeon: Mulvihill, Michael, MD;  Location: [facility] OR    IRRIGATION AND DEBRIDEMENT CHEST WASHOUT, COMBINED Right 9/18/2024    Procedure: Exploration of chest, chest washout;  Surgeon: Mulvihill, Michael, MD;  Location: [facility] OR    SIGMOIDOSCOPY FLEXIBLE N/A 2/5/2025    Procedure: Sigmoidoscopy flexible;  Surgeon: Jp Cartwright MD;  Location:  GI       Family History:    Family History   Problem Relation Age of Onset    Other Cancer Mother     Obesity Mother     GI problems Father     Uterine Cancer Sister        Social History:  Marital Status:   [2]  Social History     Tobacco Use    Smoking status:  Former     Current packs/day: 0.00     Average packs/day: 0.3 packs/day for 30.0 years (7.5 ttl pk-yrs)     Types: Cigarettes     Start date: 10/26/1993     Quit date: 10/26/2023     Years since quittin.3     Passive exposure: Past    Smokeless tobacco: Never    Tobacco comments:     Quit 10/26/2023   Vaping Use    Vaping status: Never Used   Substance Use Topics    Alcohol use: Yes     Comment: 1-2 beers per day    Drug use: No        Medications:    acetaminophen (TYLENOL) 325 MG tablet  amiodarone (PACERONE) 200 MG tablet  amLODIPine (NORVASC) 10 MG tablet  atorvastatin (LIPITOR) 80 MG tablet  digoxin (LANOXIN) 62.5 MCG tablet  escitalopram (LEXAPRO) 10 MG tablet  gabapentin (NEURONTIN) 100 MG capsule  hydrALAZINE (APRESOLINE) 25 MG tablet  magnesium hydroxide (MILK OF MAGNESIA) 400 MG/5ML suspension  magnesium oxide (MAG-OX) 400 MG tablet  melatonin 10 MG TABS tablet  multivitamin w/minerals (THERA-VIT-M) tablet  warfarin ANTICOAGULANT (COUMADIN) 2.5 MG tablet          Review of Systems  Further problem focused system review negative.    Physical Exam   BP: (!) 77/63 (MAP 66)  Pulse: 69  Temp: 97.8  F (36.6  C)  Resp: 18  Weight: 68.9 kg (152 lb)  SpO2: 99 %      Physical Exam    Nursing note and vitals were reviewed.  Constitutional: Awake and alert, nourished, chronically ill appearing 75-year-old in no apparent discomfort, who does not appear acutely ill, and who answers questions appropriately and cooperates with examination.  HEENT: Atraumatic and normocephalic.  No hematomas, depressed skull fractures, Madrigal sign, raccoon eyes, CSF otorrhea or rhinorrhea.  PERRL.  EOMI.   Neck: Freely mobile.  No pain with range of motion in 6 directions.  No tenderness to palpation or percussion over the cervical spine.  Cardiovascular: Cardiac examination reveals LVAD hum  Pulmonary/Chest: Breathing is unlabored.  Breath sounds are clear and equal bilaterally.  There no retractions, tachypnea, rales, wheezes, or  rhonchi.  Neurological: Alert, oriented, thought content logical, coherent     Psychiatric: Affect broad and appropriate.    ED Course        Procedures              Critical Care time:  none     None         Results for orders placed or performed during the hospital encounter of 03/17/25 (from the past 24 hours)   INR   Result Value Ref Range    INR 2.04 (H) 0.85 - 1.15   [facility] Draw    Narrative    The following orders were created for panel order [facility] [facility].  Procedure                               Abnormality         Status                     ---------                               -----------         ------                     Extra Green Top (Lithiu...[[id number]]                      Final result               Extra Purple Top Tube[[id number]]                           Final result                 Please view results for these tests on the individual orders.   Extra Green Top (Lithium Heparin) Tube   Result Value Ref Range    Hold Specimen JIC    Extra Purple Top Tube   Result Value Ref Range    Hold Specimen JIC    CT Head w/o Contrast    Narrative    EXAM: CT HEAD W/O CONTRAST  LOCATION: [facility]  DATE: 3/17/2025    INDICATION: fall; head injury; on warfarin  COMPARISON: Head CT 10/24/2024.  TECHNIQUE: Routine CT Head without IV contrast. Multiplanar reformats. Dose reduction techniques were used.    FINDINGS:  INTRACRANIAL CONTENTS: No intracranial hemorrhage, extraaxial collection, or mass effect.  No CT evidence of acute infarct. Mild to moderate presumed chronic small vessel ischemic changes. Mild generalized volume loss. No hydrocephalus.     VISUALIZED ORBITS/SINUSES/MASTOIDS: No intraorbital abnormality. Mucosal thickening of the visualized portion of the left maxillary sinus. No middle ear or mastoid effusion.    BONES/SOFT TISSUES: Left posterior scalp contusion. No underlying calvarial fracture. There is atherosclerotic plaque of the internal carotid arteries.       Impression    IMPRESSION:  1.  No evidence of acute intracranial abnormality.  2.  Left posterior scalp contusion without underlying calvarial fracture.  3.  Cerebral volume loss and presumed changes of chronic microvascular disease.         Medications - No data to display    Assessments & Plan (with Medical Decision Making)     75-year-old male with an LVAD device for severe ischemic cardiomyopathy chronically anticoagulated presented after a fall striking his head as described above.  He is asymptomatic.  CT scan of the brain shows no evidence of traumatic injury other than the radiologist says there is a contusion on his scalp.  However the patient has no scalp tenderness or hematoma and is asymptomatic on physical examination.  His INR is therapeutic.  At this time he can be discharged home with expectant management and should return if new concerning symptoms develop such as significant headache or focal neurologic symptoms but I do not anticipate this occurring.  I reviewed the evaluation and recommendations with the patient and his wife and they expressed understanding and their questions were answered.    I have reviewed the nursing notes.    I have reviewed the findings, diagnosis, plan and need for follow up with the patient.         New Prescriptions    No medications on file       Final diagnoses:   Fall, initial encounter   Closed head injury, initial encounter       3/17/2025   [facility] EMERGENCY DEPT       Blas Betts MD  03/17/25 [id number]

## 2025-03-17 NOTE — ED TRIAGE NOTES
Presents from home with syncopal episode yesterday at 11AM and another episode this morning. On coumadin  Pt states he hit the back of his head against a shelf.   Pt has Heartmate 2 LVAD. No recent alarms.          Triage Assessment (Adult)       Row Name 03/17/25 1518          Triage Assessment    Airway WDL WDL        Respiratory WDL    Respiratory WDL WDL        Skin Circulation/Temperature WDL    Skin Circulation/Temperature WDL WDL        Cardiac WDL    Cardiac WDL all  LVAD Heartmate 2        Peripheral/Neurovascular WDL    Peripheral Neurovascular WDL WDL        Cognitive/Neuro/Behavioral WDL    Cognitive/Neuro/Behavioral WDL WDL

## 2025-03-17 NOTE — PROGRESS NOTES
Called patient/caregiver to check in 20 weeks post discharge from VAD implant hospitalization.   Pt reports VAD parameters as follows:   Heartmate 3 LEFT VS  Flow (Lpm): 3.7 Lpm  Pulse Index (PI): 3.9 PI  Speed (rpm): 5000 rpm  Power (jackson): 3.2 jackson    These parameters are at baseline.   Pt states current weight is   Vitals:    03/17/25 1300   Weight: 68.4 kg (150 lb 11.2 oz)     which is at baseline.   Had two passing out episodes, one Sunday, one today. Thinks he stood up too quickly.   The one this morning he hit his head on a clock that was on the ground and broke it but he does not have a bump.  Patient has not checked a MAP today.     Reviewed [facility] instructions for tomorrow.     Advised patient to go to ED now for head CT.     P: Patient and wife will go to [facility] ED for evaluation/head CT. Called and spoke with BLADE Leigh at [facility]. Discussed above. Reiterated in emergency patient can receive cpr/shocks. Pt has icd. Provided number to reach out to Cardiologist on call- Dr. Nicolas [phone]. Will update Dr. Anthony Espino, RN BSN   VAD Coordinator     24/7 On-Call VAD Pager: [phone] opt 4, ask to page VAD coordinator on call

## 2025-03-18 ENCOUNTER — HOSPITAL ENCOUNTER (OUTPATIENT)
Dept: CARDIOLOGY | Facility: CLINIC | Age: 75
Discharge: HOME | End: 2025-03-18
Attending: INTERNAL MEDICINE
Payer: MEDICARE

## 2025-03-18 ENCOUNTER — APPOINTMENT (OUTPATIENT)
Dept: LAB | Facility: CLINIC | Age: 75
End: 2025-03-18
Attending: INTERNAL MEDICINE
Payer: MEDICARE

## 2025-03-18 ENCOUNTER — HOSPITAL ENCOUNTER (OUTPATIENT)
Facility: CLINIC | Age: 75
Discharge: HOME | End: 2025-03-18
Attending: INTERNAL MEDICINE | Admitting: INTERNAL MEDICINE
Payer: MEDICARE

## 2025-03-18 ENCOUNTER — ANTICOAGULATION THERAPY VISIT (OUTPATIENT)
Dept: ANTICOAGULATION | Facility: CLINIC | Age: 75
End: 2025-03-18

## 2025-03-18 ENCOUNTER — APPOINTMENT (OUTPATIENT)
Dept: MEDSURG UNIT | Facility: CLINIC | Age: 75
End: 2025-03-18
Attending: INTERNAL MEDICINE
Payer: MEDICARE

## 2025-03-18 VITALS
BODY MASS INDEX: 24.62 KG/M2 | OXYGEN SATURATION: 96 % | HEIGHT: 66 IN | TEMPERATURE: 97.5 F | RESPIRATION RATE: 16 BRPM | WEIGHT: 153.2 LBS | HEART RATE: 62 BPM

## 2025-03-18 DIAGNOSIS — I50.42 CHRONIC COMBINED SYSTOLIC AND DIASTOLIC HEART FAILURE (H): ICD-10-CM

## 2025-03-18 DIAGNOSIS — Z95.811 LVAD (LEFT VENTRICULAR ASSIST DEVICE) PRESENT (H): ICD-10-CM

## 2025-03-18 DIAGNOSIS — I95.89 OTHER SPECIFIED HYPOTENSION: ICD-10-CM

## 2025-03-18 DIAGNOSIS — Z79.899 ON AMIODARONE THERAPY: ICD-10-CM

## 2025-03-18 DIAGNOSIS — I50.22 CHRONIC SYSTOLIC CONGESTIVE HEART FAILURE (H): ICD-10-CM

## 2025-03-18 DIAGNOSIS — I50.22 CHRONIC SYSTOLIC HEART FAILURE (H): ICD-10-CM

## 2025-03-18 DIAGNOSIS — Z95.811 LEFT VENTRICULAR ASSIST DEVICE PRESENT (H): ICD-10-CM

## 2025-03-18 DIAGNOSIS — Z79.899 LONG TERM USE OF DRUG: ICD-10-CM

## 2025-03-18 DIAGNOSIS — Z79.01 ANTICOAGULATED ON WARFARIN: ICD-10-CM

## 2025-03-18 DIAGNOSIS — I50.22 CHRONIC SYSTOLIC CONGESTIVE HEART FAILURE (H): Primary | ICD-10-CM

## 2025-03-18 DIAGNOSIS — Z79.899 ENCOUNTER FOR LONG-TERM (CURRENT) USE OF MEDICATIONS: ICD-10-CM

## 2025-03-18 LAB
ALBUMIN SERPL BCG-MCNC: 3.8 G/DL (ref 3.5–5.2)
ALP SERPL-CCNC: 113 U/L (ref 40–150)
ALT SERPL W P-5'-P-CCNC: 24 U/L (ref 0–70)
ANION GAP SERPL CALCULATED.3IONS-SCNC: 11 MMOL/L (ref 7–15)
AST SERPL W P-5'-P-CCNC: 30 U/L (ref 0–45)
BILIRUB SERPL-MCNC: 0.3 MG/DL
BUN SERPL-MCNC: 20.8 MG/DL (ref 8–23)
CALCIUM SERPL-MCNC: 9.2 MG/DL (ref 8.8–10.4)
CHLORIDE SERPL-SCNC: 97 MMOL/L (ref 98–107)
CREAT SERPL-MCNC: 0.94 MG/DL (ref 0.67–1.17)
EGFRCR SERPLBLD CKD-EPI 2021: 85 ML/MIN/1.73M2
ERYTHROCYTE [DISTWIDTH] IN BLOOD BY AUTOMATED COUNT: 15.7 % (ref 10–15)
GLUCOSE SERPL-MCNC: 84 MG/DL (ref 70–99)
HCO3 SERPL-SCNC: 24 MMOL/L (ref 22–29)
HCT VFR BLD AUTO: 31.9 % (ref 40–53)
HGB BLD-MCNC: 10.2 G/DL (ref 13.3–17.7)
HGB BLD-MCNC: 10.5 G/DL (ref 13.3–17.7)
INR PPP: 2.09 (ref 0.85–1.15)
LDH SERPL L TO P-CCNC: 222 U/L (ref 0–250)
LVEF ECHO: NORMAL
Lab: NORMAL
MCH RBC QN AUTO: 30.1 PG (ref 26.5–33)
MCHC RBC AUTO-ENTMCNC: 32.9 G/DL (ref 31.5–36.5)
MCV RBC AUTO: 91 FL (ref 78–100)
NT-PROBNP SERPL-MCNC: 1494 PG/ML (ref 0–900)
OXYHGB MFR BLDV: 68 % (ref 70–75)
PERFORMING LABORATORY: NORMAL
PLATELET # BLD AUTO: 192 10E3/UL (ref 150–450)
POTASSIUM SERPL-SCNC: 3.8 MMOL/L (ref 3.4–5.3)
PROT SERPL-MCNC: 7.6 G/DL (ref 6.4–8.3)
RBC # BLD AUTO: 3.49 10E6/UL (ref 4.4–5.9)
SODIUM SERPL-SCNC: 132 MMOL/L (ref 135–145)
SPECIMEN STATUS: NORMAL
TEST NAME: NORMAL
TSH SERPL DL<=0.005 MIU/L-ACNC: 2.91 UIU/ML (ref 0.3–4.2)
WBC # BLD AUTO: 5.5 10E3/UL (ref 4–11)

## 2025-03-18 PROCEDURE — 272N000001 HC OR GENERAL SUPPLY STERILE: Performed by: INTERNAL MEDICINE

## 2025-03-18 PROCEDURE — 36415 COLL VENOUS BLD VENIPUNCTURE: CPT | Performed by: INTERNAL MEDICINE

## 2025-03-18 PROCEDURE — 82310 ASSAY OF CALCIUM: CPT | Performed by: INTERNAL MEDICINE

## 2025-03-18 PROCEDURE — 80321 ALCOHOLS BIOMARKERS 1OR 2: CPT | Performed by: INTERNAL MEDICINE

## 2025-03-18 PROCEDURE — 85018 HEMOGLOBIN: CPT

## 2025-03-18 PROCEDURE — 85014 HEMATOCRIT: CPT | Performed by: INTERNAL MEDICINE

## 2025-03-18 PROCEDURE — 93306 TTE W/DOPPLER COMPLETE: CPT

## 2025-03-18 PROCEDURE — 82040 ASSAY OF SERUM ALBUMIN: CPT | Performed by: INTERNAL MEDICINE

## 2025-03-18 PROCEDURE — 85610 PROTHROMBIN TIME: CPT | Performed by: INTERNAL MEDICINE

## 2025-03-18 PROCEDURE — 80323 ALKALOIDS NOS: CPT | Performed by: INTERNAL MEDICINE

## 2025-03-18 PROCEDURE — 83880 ASSAY OF NATRIURETIC PEPTIDE: CPT | Performed by: INTERNAL MEDICINE

## 2025-03-18 PROCEDURE — 93451 RIGHT HEART CATH: CPT | Performed by: INTERNAL MEDICINE

## 2025-03-18 PROCEDURE — 999N000132 HC STATISTIC PP CARE STAGE 1

## 2025-03-18 PROCEDURE — 999N000142 HC STATISTIC PROCEDURE PREP ONLY

## 2025-03-18 PROCEDURE — 83615 LACTATE (LD) (LDH) ENZYME: CPT | Performed by: INTERNAL MEDICINE

## 2025-03-18 PROCEDURE — 80307 DRUG TEST PRSMV CHEM ANLYZR: CPT | Performed by: INTERNAL MEDICINE

## 2025-03-18 PROCEDURE — 93306 TTE W/DOPPLER COMPLETE: CPT | Mod: 26 | Performed by: STUDENT IN AN ORGANIZED HEALTH CARE EDUCATION/TRAINING PROGRAM

## 2025-03-18 PROCEDURE — C1751 CATH, INF, PER/CENT/MIDLINE: HCPCS | Performed by: INTERNAL MEDICINE

## 2025-03-18 PROCEDURE — 250N000009 HC RX 250: Performed by: INTERNAL MEDICINE

## 2025-03-18 PROCEDURE — 82810 BLOOD GASES O2 SAT ONLY: CPT

## 2025-03-18 PROCEDURE — 82247 BILIRUBIN TOTAL: CPT | Performed by: INTERNAL MEDICINE

## 2025-03-18 PROCEDURE — 93451 RIGHT HEART CATH: CPT | Mod: 26 | Performed by: INTERNAL MEDICINE

## 2025-03-18 PROCEDURE — 84443 ASSAY THYROID STIM HORMONE: CPT | Performed by: INTERNAL MEDICINE

## 2025-03-18 RX ORDER — LIDOCAINE 40 MG/G
CREAM TOPICAL
Status: COMPLETED | OUTPATIENT
Start: 2025-03-18 | End: 2025-03-18

## 2025-03-18 RX ADMIN — LIDOCAINE: 40 CREAM TOPICAL at 12:30

## 2025-03-18 ASSESSMENT — ACTIVITIES OF DAILY LIVING (ADL)
ADLS_ACUITY_SCORE: 57

## 2025-03-18 NOTE — PROVIDER NOTIFICATION
Dr Mesa updated of his Doppler MAP, LVAD numbers. Pt feels fine, has baseline intermittent lightheadedness, dizziness when getting up. Per MD- pt okay to discharge. Pt's LVAD coordinator Franchesca Haddad also updated. In house LVAD coordinator unable to see pt here due to staffing, pt encouraged to reach out to his regular coordinator regarding his concern of his driveline. Site c/d/I. Extra anchor provided. Right neck remained soft, dry and intact. Pt discharged via wheelchair to [facility] accompanied by wife. Pt has all his belongings including back up controller and extra batteries.

## 2025-03-18 NOTE — PROGRESS NOTES
Pt arrived on 2A, room 12 for RHC, VAD speed optimization. Doppler map of 80. Denies any pain. Labs reviewed with Brandi URIAH (INR 2.09, BNP 1494, K 3.8). Brandi also made aware of his PI number (5.9-6.1). Lidocaine applied to Right neck. Pt reports concern regarding his driveline moving in and out of his abdomen when he breathes, also his anchor doesn't stick well on his skin, LVAD coordinator made aware. Pt has his backup controller and batteries at bedside. Wife Melissa in gold waiting.

## 2025-03-18 NOTE — DISCHARGE INSTRUCTIONS
[facility]                        Interventional Cardiology  Discharge Instructions   Post Right Heart Cath       AFTER YOU GO HOME:  DO drink plenty of fluids  DO resume your regular diet and medications unless otherwise instructed by your Primary Physician  Do Not scrub the procedure site vigorously  No lotion or powder to the puncture site for 3 days    CALL YOUR PRIMARY PHYSICIAN IF: You may resume all normal activity.  Monitor neck site for bleeding, swelling, or voice changes. If you notice bleeding or swelling immediately apply pressure to the site and call number below to speak with Cardiology Fellow.  If you experience any changes in your breathing you should call your doctor immediately or come to the closest Emergency Department.  Do not drive yourself.    ADDITIONAL INSTRUCTIONS: Medications: You are to resume all home medications including anticoagulation therapy unless otherwise advised by your primary cardiologist or nurse coordinator.    Follow Up: Per your primary cardiology team    If you have any questions or concerns regarding your procedure site please call [phone] at anytime and ask for Cardiology Fellow on call.  They are available 24 hours a day.  You may also contact the Cardiology Clinic after hours number at [phone].                                                       Telephone Numbers [phone] Monday-Friday 8:00 am to 4:30 pm    [phone] After 4:30 pm Monday-Friday, Weekends & Holidays  Ask for Interventional Cardiologist on call. Someone is on call 24 hours/day   [facility] toll free number [phone] Monday-Friday 8:00 am to 4:30 pm   [facility] Emergency Dept [phone]

## 2025-03-18 NOTE — PRE-PROCEDURE
GENERAL PRE-PROCEDURE:   Procedure:  Right heart catheterization with left ventricular assist device speed optimization  Date/Time:  3/18/2025 1:20 PM    Verbal consent obtained?: Yes    Risks and benefits: Risks, benefits and alternatives were discussed    Consent given by:  Patient  Patient states understanding of procedure being performed: Yes    Patient's understanding of procedure matches consent: Yes    Procedure consent matches procedure scheduled: Yes    Appropriately NPO:  Yes  ASA Class:  3  Mallampati  :  Grade 3- soft palate visible, posterior pharyngeal wall not visible  Lungs:  Lungs clear with good breath sounds bilaterally  Heart:  Normal heart sounds and rate  History & Physical reviewed:  History and physical reviewed and no updates needed  Statement of review:  I have reviewed the lab findings, diagnostic data, medications, and the plan for sedation

## 2025-03-18 NOTE — PROGRESS NOTES
ANTICOAGULATION MANAGEMENT     Duane C Johnson 75 year old male is on warfarin with therapeutic INR result. (Goal INR 2.0-3.0)    Recent labs: (last 7 days)     03/18/25  1124   INR 2.09*       ASSESSMENT     Source(s): Chart Review and Patient/Caregiver Call     Warfarin doses taken: Warfarin taken as instructed  Diet: No new diet changes identified  Medication/supplement changes: None noted  New illness, injury, or hospitalization: RHC with VAD speed optimization 3/18  Signs or symptoms of bleeding or clotting: No  Previous result: Subtherapeutic  Additional findings: None       PLAN     Recommended plan for no diet, medication or health factor changes affecting INR     Dosing Instructions: Continue your current warfarin dose with next INR in 1 week       Summary  As of 3/18/2025      Full warfarin instructions:  1.25 mg every Sun, Tue, Thu; 2.5 mg all other days   Next INR check:  3/25/2025               Telephone call with Melissa who agrees to plan and repeated back plan correctly    Patient to recheck with home meter    Education provided: Goal range and lab monitoring: goal range and significance of current result and Importance of following up at instructed interval  Contact [phone] with any changes, questions or concerns.     Plan made per [facility] anticoagulation protocol and per LVAD protocol    CANDY LOYOLA RN  3/19/2025  Anticoagulation Clinic  [employer] for routing messages: susannah ANTICOWHITNEY LVAD  [facility] patient phone line: [phone]        _______________________________________________________________________     Anticoagulation Episode Summary       Current INR goal:  2.0-3.0   TTR:  58.5% (4 mo)   Target end date:  Indefinite   Send INR reminders to:  MONIK LVAD    Indications    Chronic systolic congestive heart failure (H) [I50.22]  Anticoagulated on warfarin [Z79.01]  LVAD (left ventricular assist device) present (H) [Z95.811]  Other specified hypotension [I95.89]  Chronic combined systolic and  diastolic heart failure (H) [I50.42]             Comments:  Follow VAD Anticoag protocol:Yes: HeartMate 3  Bridging: Enoxaparin  Date VAD placed: 9/18/24             Anticoagulation Care Providers       Provider Role Specialty Phone number    Ham Cruz MD Referring Advanced Heart Failure and Transplant Cardiology [phone]    Sravanthi Asencio MD Referring Cardiovascular Disease [phone]

## 2025-03-18 NOTE — PROGRESS NOTES
Pt arrived back on 2A post RHC. Doppler MAP 92, also Pi of 7.8 and flow of 2.7-awaiting to hear back from his LVAD coordinator. Right internal jugular site covered with primapore soft, dry and intact. Denies any pain. Pt eating/drinking. Discharge instruction reviewed with pt and spouse Melissa and they verbalizes understanding. Copy of AVS given.

## 2025-03-18 NOTE — PROGRESS NOTES
ANTICOAGULATION MANAGEMENT     Duane C Johnson 75 year old male is on warfarin with therapeutic INR result. (Goal INR 2.0-3.0)    Recent labs: (last 7 days)     03/17/25  1754   INR 2       ASSESSMENT     Source(s): Chart Review     Warfarin doses taken: Reviewed in chart  Diet: No new diet changes identified  Medication/supplement changes: None noted  New illness, injury, or hospitalization: Yes: ER visit yesterday s/p fall, closed head injury. Head CT negative for acute findings. HEENT: Atraumatic and normocephalic.  No hematomas, depressed skull fractures, Madrigal sign, raccoon eyes, CSF otorrhea or rhinorrhea.  PERRL.  EOMI.   Signs or symptoms of bleeding or clotting: No  Previous result: Subtherapeutic  Additional findings: RHC scheduled for today.        PLAN     Recommended plan for temporary change(s) affecting INR     Dosing Instructions: Continue your current warfarin dose with next INR in 1 week       Summary  As of 3/18/2025      Full warfarin instructions:  1.25 mg every Sun, Tue, Thu; 2.5 mg all other days   Next INR check:  3/24/2025               Detailed voice message left for Duane with dosing instructions and follow up date.     Patient to recheck with home meter    Education provided: Please call back if any changes to your diet, medications or how you've been taking warfarin  Goal range and lab monitoring: goal range and significance of current result and Importance of following up at instructed interval  Contact [phone] with any changes, questions or concerns.     Plan made per [facility] anticoagulation protocol and per LVAD protocol    CANDY LOYOLA RN  3/18/2025  Anticoagulation Clinic  [facility] for routing messages: susannah MARTINEZ LVAD  [facility] patient phone line: [phone]        _______________________________________________________________________     Anticoagulation Episode Summary       Current INR goal:  2.0-3.0   TTR:  58.5% (4 mo)   Target end date:  Indefinite   Send INR reminders to:   ANTICOAG LVAD    Indications    Chronic systolic congestive heart failure (H) [I50.22]  Anticoagulated on warfarin [Z79.01]  LVAD (left ventricular assist device) present (H) [Z95.811]  Other specified hypotension [I95.89]  Chronic combined systolic and diastolic heart failure (H) [I50.42]             Comments:  Follow VAD Anticoag protocol:Yes: HeartMate 3  Bridging: Enoxaparin  Date VAD placed: 9/18/24             Anticoagulation Care Providers       Provider Role Specialty Phone number    Ham Cruz MD Referring Advanced Heart Failure and Transplant Cardiology [phone]    Sravanthi Asencio MD Referring Cardiovascular Disease [phone]

## 2025-03-19 ENCOUNTER — CARE COORDINATION (OUTPATIENT)
Dept: CARDIOLOGY | Facility: CLINIC | Age: 75
End: 2025-03-19
Payer: MEDICARE

## 2025-03-19 ENCOUNTER — HOSPITAL ENCOUNTER (OUTPATIENT)
Dept: CARDIAC REHAB | Facility: CLINIC | Age: 75
Discharge: HOME | End: 2025-03-19
Attending: SURGERY
Payer: MEDICARE

## 2025-03-19 PROCEDURE — 93798 PHYS/QHP OP CAR RHAB W/ECG: CPT | Mod: KX

## 2025-03-19 NOTE — PROGRESS NOTES
D: Called patient to check in after visit yesterday and ED visit Monday.    I/A:   -  Patient has not checked MAP today, was elevated yesterday afternoon after RHC. Reminded patient to check daily via doppler and document. Asked him to please bring log book with to clinic next week.   - Patient reported drive line moving, changed a few weeks ago. No pulls on driveline. Discussed with patient to ensure it is well anchored, monitor daily for changes. Page if changes occur.   - Discussed that Ho MURGUIA would be taking over as his lvad coordinator. Provided phone number for Ho  [phone]    P: Patient notified to page on-call coordinator if symptoms worsen or with other concerns. Patient verbalized understanding.

## 2025-03-20 DIAGNOSIS — Z79.01 ANTICOAGULATED ON WARFARIN: ICD-10-CM

## 2025-03-20 DIAGNOSIS — I50.22 CHRONIC SYSTOLIC CONGESTIVE HEART FAILURE (H): Primary | ICD-10-CM

## 2025-03-20 DIAGNOSIS — Z95.811 LVAD (LEFT VENTRICULAR ASSIST DEVICE) PRESENT (H): ICD-10-CM

## 2025-03-20 LAB
LABCORP INTERFACED MISCELLANEOUS TEST RESULT: NORMAL
LABORATORY REPORT: NORMAL
PETH INTERPRETATION: NORMAL
PLPETH BLD-MCNC: <10 NG/ML
POPETH BLD-MCNC: <10 NG/ML

## 2025-03-21 ENCOUNTER — HOSPITAL ENCOUNTER (OUTPATIENT)
Dept: CARDIAC REHAB | Facility: CLINIC | Age: 75
Discharge: HOME | End: 2025-03-21
Attending: SURGERY
Payer: MEDICARE

## 2025-03-21 PROCEDURE — 93798 PHYS/QHP OP CAR RHAB W/ECG: CPT | Mod: KX

## 2025-03-21 NOTE — PROGRESS NOTES
[facility]  Advanced Heart Failure Clinic    Patient Name: Duane C Johnson   : 1950   Date of Visit: 2025    Primary Care Physician: Jose Coles MD     Referring Physician: No ref. provider found   Reason for Visit: LVAD    Dear Dr. Sanket MD     I had the pleasure of seeing Duane C Johnson today in the [facility]. As you know Duane C Johnson is a pleasant 74 year old year old male, who presents today for further evaluation of LVAD.    Duane has a history of end-stage cardiomyopathy, history of coronary artery disease, previous STEMI, history of VT VF arrest, status post placement of ICD, history of A-fib a flutter with previous cardioversion, status post HeartMate 3 LVAD implantation 2024.    Duane was discharged to ARU on 10/5/24. There, he continued to have frequent low flow alarms with labile BPs and has required frequent adjustments to BP medications, initiation of florinef, as well as IVF and increased PO intake. Seen in clinic on 10/23 where he was presyncopal with MAPs in 40s. He was sent from clinic to ED for further evaluation. Admitted 10/23/24 for further workup of hypotension, hypovolemia, and low flows. Underwent stroke eval for neurological changes with negative CT. Symptoms improved following 2L IVF and improved MAPs. He was resumed on low doses of GDMT/afterload reduction. During this hospitalization continued to have several more runs of low flows. MAPs/meds optimized and prior to discharge he was without low flows/high PIs x 3 days. Controlled was also switched to low flow controlled. Discharge was delayed due to needing additional LVAD education. Consulted neuropsych due to concerns for cognitive changes - Alzheimer's disease or possibly mixed pathology (mild cognitive impairment).     Following discharge, he presented to the emergency room on 11/10/2024 with witnessed syncopal episode, feeling lightheaded which led to a  fall to the ground but he did not strike his head or suffer any injury.  He reported that he did not lose consciousness although EMS reported that family witnessed a brief loss of consciousness episode.  The ER spoke to Dr. Nicolas, who suggested aggressive hydration as he is known to have hypovolemia and orthostasis and he had not had much to eat or drink that day before this episode. He did not have a low flow alarm then.    We have been seeing him in clinic frequently, and have mostly cut back on some of his anti hypertensives and working on a plan of aggressive hydration. He saw me last on 1/6/25, when he was doing well    2/3/25 clinic visit  Today he reports blood in stool, fatigue, generalized weakness, dizziness/shakiness today. He sat down on the floor next to the bed as he went to lay down.  Last visit his lightheadedness was better, but now is worse for 4-5 days. He had labs on Thursday when Hb was stable but symptoms have worsened.  He has red blood in stool, and thinks this is a very small amount. He has normal bowel movements.    He is drinking a lot (gatorade), and he and his wife confirm that he is drinking at least 100 - 120 oz of fluid per day water/ gatorade, milk, one 14 oz of Core 42 protein drink, has cut down on mountain dew to less than one per day. His low flow alarm limit is set to 2L/min, last alarm was on 12/8/24, very brief.    He is mostly not using his walker, and reports he is using it if walking further than just going from couch to bathroom (about 20 feet) when he holds on to walls/doorways. His wife is with him 24x7, she works from home.    He has started cardiac rehab Jan 2025, going MWF    Home BPs - Doppler at home MAPs in 86-90 mm Hg range  Home weights - not checking    3/24/25 clinic visit:   Last seen 2/3/25.   Admitted from 2/5 - 2/7/25 for hematochezia, dizziness and weakness. INR was found to be super therapeutic to 3.61 on admission. Flex sig showed radiation proctitis  with no treatable lesion. Prescribed sucralfate enemas.    Lower GI bleeding has since resolved     3/17/25 ED encounter for fall in the setting of syncope w/ head trauma. CT head negative for any intracranial bleeding but was noted to have a small scalp hematoma.    There is no interim history of chest pain, tightness, changing exercise tolerance, paroxysmal nocturnal dyspnea, orthopnea, dependent edema, palpitation, headaches, acute vision changes, fevers, chills, cough, sore throat, and signs of bleeding.     He continues to have some episodes of lightheadedness some of which are such that that he recognizes them quickly and sits down to avoid fall and injury.  He has had a couple episodes more recently where they come on pretty quickly including the 1 with syncope and head trauma last week.    Continues to go to cardiac rehab Monday Wednesday and Friday and is tolerating most of the exercises well with the exception of the treadmill; he notes that he will become lightheaded pretty quickly while on the treadmill.  So he is not going into treadmill anymore but walks with a walker and does other exercises.  He can walk to the end of his driveway and back which is total about 2000 feet to get the mail, but will need to use a chair to rest senior care through.    Patient reports that maps that cardiac rehab have been in the upper 70s, the opposite home have been in the upper 60s to low 70s.     He had a right heart cath and echocardiogram last week which are noted below.    ROS:  A complete 10-point ROS was negative except as above.    PAST MEDICAL HISTORY:  Past Medical History:   Diagnosis Date    Benign essential hypertension     CAD (coronary artery disease)     Chronic systolic heart failure (H)     Hypertension     ST elevation myocardial infarction (STEMI), unspecified artery (H)     Ventricular fibrillation (H)        PAST SURGICAL HISTORY:  Past Surgical History:   Procedure Laterality Date    ANESTHESIA  CARDIOVERSION N/A 9/5/2024    Procedure: Anesthesia cardioversion;  Surgeon: GENERIC ANESTHESIA PROVIDER;  Location: [facility]    ANESTHESIA CARDIOVERSION N/A 9/30/2024    Procedure: Anesthesia cardioversion;  Surgeon: GENERIC ANESTHESIA PROVIDER;  [facility]: [facility]    COLONOSCOPY N/A 3/23/2023    Procedure: COLONOSCOPY, FLEXIBLE, WITH LESION REMOVAL USING SNARE;  Surgeon: Jose Faustin MD;  Location: [facility]    CV CENTRAL VENOUS CATHETER PLACEMENT N/A 10/26/2023    Procedure: Central Venous Catheter Placement;  Surgeon: Rob Lyles MD;  Location:  HE[facility] CARDIAC CATH LAB    CV CORONARY ANGIOGRAM N/A 10/27/2023    Procedure: Coronary Angiogram;  Surgeon: Rob Lyles MD;  [facility]    CV CORONARY ANGIOGRAM N/A 10/26/2023    Procedure: Coronary Angiogram;  Surgeon: Rob Lyles MD;  Location:  HEART CARDIAC CATH LAB    CV LEFT HEART CATH N/A 10/26/2023    Procedure: Left Heart Catheterization;  Surgeon: Rob Lyles MD;  Location:  HEART CARDIAC CATH LAB    CV PCI N/A 10/27/2023    Procedure: Percutaneous Coronary Intervention;  Surgeon: Rob Lyles MD;  [facility]    CV PCI STENT DRUG ELUTING N/A 10/26/2023    Procedure: Percutaneous Coronary Intervention Stent;  Surgeon: Rob Lyles MD;  [facility]    CV RIGHT HEART CATH MEASUREMENTS RECORDED N/A 5/6/2024    Procedure: Heart Cath Right Heart Cath;  Surgeon: Rob Lyles MD;  [facility]    CV RIGHT HEART CATH MEASUREMENTS RECORDED N/A 9/3/2024    Procedure: Right Heart Catheterization;  Surgeon: Aaron Mesa MD;  [facility]    CV RIGHT HEART CATH MEASUREMENTS RECORDED N/A 10/24/2024    Procedure: Right Heart Catheterization;  Surgeon: Haile Braden MD;  [facility]    CV RIGHT HEART CATH MEASUREMENTS RECORDED N/A  3/18/2025    Procedure: Right Heart Catheterization with VAD Speed Optimization (no echo);  Surgeon: Aaron Mesa MD;  Location:  HEART CARDIAC CATH LAB    EP ICD INSERT SINGLE N/A 2024    Procedure: Implantable Cardioverter Defibrillator Device & Lead Implant Dual;  Surgeon: Guero Black MD;  Location:  HEART CARDIAC CATH LAB    INJECTION, HYDROGEL SPACER N/A 2024    Procedure: INJECTION, HYDROGEL SPACER AND FIDUCIAL MARKER PLACEMENT;  Surgeon: Stanislaw Barros MD;  Location: [facility]    INSERT VENTRICULAR ASSIST DEVICE LEFT (HEARTMATE II) N/A 2024    Procedure: Median Sternotomy, INSERTION of LEFT VENTRICULAR ASSIST DEVICE (HEARTMATE III), cardiopulmonary bypass, transesophageal echocardiogram performed by anesthesia.;  Surgeon: Mulvihill, Michael, MD;  [facility]: [facility]    IRRIGATION AND DEBRIDEMENT CHEST WASHOUT, COMBINED Right 2024    Procedure: Exploration of chest, chest washout;  Surgeon: Mulvihill, Michael, MD;  Location: [facility]    SIGMOIDOSCOPY FLEXIBLE N/A 2025    Procedure: Sigmoidoscopy flexible;  Surgeon: Jp Cartwright MD;  Location:  GI       FAMILY HISTORY:  Family History   Problem Relation Age of Onset    Other Cancer Mother     Obesity Mother     GI problems Father     Uterine Cancer Sister        SOCIAL HISTORY:  Social History     Socioeconomic History    Marital status:      Spouse name: None    Number of children: None    Years of education: None    Highest education level: None   Tobacco Use    Smoking status: Former     Current packs/day: 0.00     Average packs/day: 0.3 packs/day for 30.0 years (7.5 ttl pk-yrs)     Types: Cigarettes     Start date: 10/26/1993     Quit date: 10/26/2023     Years since quittin.0     Passive exposure: Past    Smokeless tobacco: Never    Tobacco comments:     Quit 10/26/2023   Vaping Use    Vaping status: Never Used   Substance and Sexual Activity    Alcohol use: Yes     Comment: 1-2 beers per day     Drug use: No    Sexual activity: Yes     Partners: Female     Birth control/protection: None   Other Topics Concern    Parent/sibling w/ CABG, MI or angioplasty before 65F 55M? No     Social Drivers of Health     Financial Resource Strain: Low Risk  (10/23/2024)    Financial Resource Strain     Within the past 12 months, have you or your family members you live with been unable to get utilities (heat, electricity) when it was really needed?: No   Food Insecurity: Low Risk  (10/23/2024)    Food Insecurity     Within the past 12 months, did you worry that your food would run out before you got money to buy more?: No     Within the past 12 months, did the food you bought just not last and you didn t have money to get more?: No   Transportation Needs: Low Risk  (10/23/2024)    Transportation Needs     Within the past 12 months, has lack of transportation kept you from medical appointments, getting your medicines, non-medical meetings or appointments, work, or from getting things that you need?: No   Physical Activity: Inactive (11/16/2023)    Exercise Vital Sign     Days of Exercise per Week: 0 days     Minutes of Exercise per Session: 0 min   Social Connections: Unknown (11/16/2023)    Social Connection and Isolation Panel [NHANES]     Marital Status:    Interpersonal Safety: Low Risk  (10/23/2024)    Interpersonal Safety     Do you feel physically and emotionally safe where you currently live?: Yes     Within the past 12 months, have you been hit, slapped, kicked or otherwise physically hurt by someone?: No     Within the past 12 months, have you been humiliated or emotionally abused in other ways by your partner or ex-partner?: No   Recent Concern: Interpersonal Safety - High Risk (10/17/2024)    Interpersonal Safety     Do you feel physically and emotionally safe where you currently live?: No     Within the past 12 months, have you been hit, slapped, kicked or otherwise physically hurt by someone?: No      Within the past 12 months, have you been humiliated or emotionally abused in other ways by your partner or ex-partner?: No   Housing Stability: Low Risk  (10/23/2024)    Housing Stability     Do you have housing? : Yes     Are you worried about losing your housing?: No   Recent Concern: Housing Stability - High Risk (9/1/2024)    Housing Stability     Do you have housing? : No     Are you worried about losing your housing?: No       MEDICATIONS:  Current Outpatient Medications   Medication Sig Dispense Refill    acetaminophen (TYLENOL) 325 MG tablet Take 2 tablets (650 mg) by mouth every 4 hours as needed for other (For optimal non-opioid multimodal pain management to improve pain control.).      amiodarone (PACERONE) 200 MG tablet Take 1 tablet (200 mg) by mouth daily. 90 tablet 3    amLODIPine (NORVASC) 10 MG tablet Take 0.5 tablets (5 mg) by mouth daily. 45 tablet 3    atorvastatin (LIPITOR) 80 MG tablet Take 1 tablet (80 mg) by mouth every evening. 90 tablet 3    digoxin (LANOXIN) 62.5 MCG tablet Take 1 tablet (62.5 mcg) by mouth daily. 90 tablet 0    escitalopram (LEXAPRO) 10 MG tablet Take 1 tablet (10 mg) by mouth or Feeding Tube daily. 90 tablet 3    gabapentin (NEURONTIN) 100 MG capsule Take 1 capsule (100 mg) by mouth or Feeding Tube at bedtime. 90 capsule 3    hydrALAZINE (APRESOLINE) 25 MG tablet Take 1 tablet (25 mg) by mouth 2 times daily. 180 tablet 3    magnesium hydroxide (MILK OF MAGNESIA) 400 MG/5ML suspension Take 30 mLs by mouth daily as needed for constipation (Use if polyethylene glycol (Miralax) is not effective after 24 hours.). 354 mL 0    magnesium oxide (MAG-OX) 400 MG tablet Take 1 tablet (400 mg) by mouth daily. 90 tablet 3    melatonin 10 MG TABS tablet Take 1 tablet (10 mg) by mouth every evening.      multivitamin w/minerals (THERA-VIT-M) tablet Take 1 tablet by mouth daily. 90 tablet 3    warfarin ANTICOAGULANT (COUMADIN) 2.5 MG tablet Take 0.5 tablets (1.25 mg) by mouth daily.        No current facility-administered medications for this visit.        ALLERGIES:     Allergies   Allergen Reactions    Brilinta [Ticagrelor] Other (See Comments) and Difficulty breathing     Per pt and spouse, hyperventilation.    Clonazepam Other (See Comments)     Per spouse, acted like a zombie and he was shaky, could barely talk.       PHYSICAL EXAM:  BP (!) 86/0 (BP Location: Right arm, Patient Position: Chair, Cuff Size: Adult Regular)   Wt 71.8 kg (158 lb 6.4 oz)   SpO2 99%   BMI 25.57 kg/m      General: Well-nourished, well-developed, NAD   Chest: Normal work of breathing. clear to ausculation.   Cardio: VAD hum. Drive line c/d/I. Minimal crusting   Abdomen: Driveline dressing removed and driveline site examined  Extremities: Edema not present      LABS:  CMP  Last Comprehensive Metabolic Panel:  Sodium   Date Value Ref Range Status   03/18/2025 132 (L) 135 - 145 mmol/L Final   05/26/2021 141 133 - 144 mmol/L Final     Sodium Whole Blood   Date Value Ref Range Status   10/27/2023 138 135 - 145 mmol/L Final     Potassium   Date Value Ref Range Status   03/18/2025 3.8 3.4 - 5.3 mmol/L Final   05/26/2021 4.5 3.4 - 5.3 mmol/L Final     Potassium Whole Blood   Date Value Ref Range Status   09/23/2024 2.5 (LL) 3.4 - 5.3 mmol/L Final     Potassium POCT   Date Value Ref Range Status   10/23/2024 3.8 3.4 - 5.3 mmol/L Final     Chloride   Date Value Ref Range Status   03/18/2025 97 (L) 98 - 107 mmol/L Final   05/26/2021 105 94 - 109 mmol/L Final     Chloride Whole Blood   Date Value Ref Range Status   10/27/2023 103 94 - 109 mmol/L Final     Carbon Dioxide   Date Value Ref Range Status   05/26/2021 29 20 - 32 mmol/L Final     Carbon Dioxide (CO2)   Date Value Ref Range Status   03/18/2025 24 22 - 29 mmol/L Final     Carbon Dioxide Whole Blood   Date Value Ref Range Status   10/27/2023 27 20 - 32 mmol/L Final     Anion Gap   Date Value Ref Range Status   03/18/2025 11 7 - 15 mmol/L Final   05/26/2021 7 3 - 14  "mmol/L Final     Glucose   Date Value Ref Range Status   03/18/2025 84 70 - 99 mg/dL Final   05/26/2021 125 (H) 70 - 99 mg/dL Final     GLUCOSE BY METER POCT   Date Value Ref Range Status   11/09/2024 111 (H) 70 - 99 mg/dL Final     Urea Nitrogen   Date Value Ref Range Status   03/18/2025 20.8 8.0 - 23.0 mg/dL Final   05/26/2021 41 (H) 7 - 30 mg/dL Final     Creatinine   Date Value Ref Range Status   03/18/2025 0.94 0.67 - 1.17 mg/dL Final   05/26/2021 0.80 0.66 - 1.25 mg/dL Final     GFR Estimate   Date Value Ref Range Status   03/18/2025 85 >60 mL/min/1.73m2 Final     Comment:     eGFR calculated using 2021 CKD-EPI equation.   05/26/2021 90 >60 mL/min/[1.73_m2] Final     Comment:     Non  GFR Calc  Starting 12/18/2018, serum creatinine based estimated GFR (eGFR) will be   calculated using the Chronic Kidney Disease Epidemiology Collaboration   (CKD-EPI) equation.       GFR, ESTIMATED POCT   Date Value Ref Range Status   10/23/2024 >60 >60 mL/min/1.73m2 Final     Calcium   Date Value Ref Range Status   03/18/2025 9.2 8.8 - 10.4 mg/dL Final   05/26/2021 8.3 (L) 8.5 - 10.1 mg/dL Final     Bilirubin Total   Date Value Ref Range Status   03/18/2025 0.3 <=1.2 mg/dL Final   03/20/2018 0.5 0.2 - 1.3 mg/dL Final     Alkaline Phosphatase   Date Value Ref Range Status   03/18/2025 113 40 - 150 U/L Final   03/20/2018 82 40 - 150 U/L Final     ALT   Date Value Ref Range Status   03/18/2025 24 0 - 70 U/L Final   03/20/2018 22 0 - 70 U/L Final     AST   Date Value Ref Range Status   03/18/2025 30 0 - 45 U/L Final   03/20/2018 20 0 - 45 U/L Final       NT-proBNP       LDH      TROP  No results found for: \"TROPI\", \"TROPONIN\", \"TROPR\", \"TROPN\"    CBC  CBC RESULTS:   Recent Labs   Lab Test 11/13/24  0910   WBC 5.4   RBC 3.49*   HGB 10.6*   HCT 31.3*   MCV 90   MCH 30.4   MCHC 33.9   RDW 15.1*          LIPIDS  Recent Labs   Lab Test 09/04/24  0626 08/31/24  0414   CHOL 85 83   HDL 29* 25*   LDL 45 46   TRIG 56 " 59       TSH  TSH   Date Value Ref Range Status   03/18/2025 2.91 0.30 - 4.20 uIU/mL Final       HBA1C  Lab Results   Component Value Date    A1C 5.4 09/18/2024    A1C 4.8 03/13/2023     CARDIAC DATA:    EKG:  10/23/24: Atrial fibrillation, Left anterior fascicular block, Possible Lateral infarct , age undetermined     Echo:  10/23/24  LVAD cannula was seen in the expected anatomic position in the LV apex.  HM3 at 5100RPM.  LVIDd 47mm.  Septum normal.  Aortic valve open intermittently. No AI.  Normal flow velocities.  Global right ventricular function is mildly to moderately reduced.  Small circumferential pericardial effusion is present without any hemodynamic significance.    3/18/25   LVEDD is 5.2 cm. Septum is midline.  Global right ventricular function is mildly to moderately reduced. Mild right  ventricular dilation is present.  Aortic valve opens with every cardiac cycle. No aortic regurgitation is  present.  The right ventricular systolic pressure is 22mmHg above the right atrial  pressure.  The inferior vena cava was normal in size with preserved respiratory  variability.  Small circumferential pericardial effusion is present without any hemodynamic  significance. Chamber compression is not present; there is no evidence for  tamponade.   This study was compared with the study from 10/23/2024, LVEDD is mildly  larger.    Cardiac Catheterization:  [facility] 10/23/24  BSA 1.75 m2  /89/101 mmHg  RA --/--/6 mmHg  RV 30/3mmHg  PA 30/14/20 mmHg  PCWP --/--/9 mmHg  TD CO/CI 4.1/2.34   Goldy CO/CI 4.54/2.59   PVR 2.68 (TD)  PA sat 65%   PCW Oximeter sat 95%  Hgb 10.5 g/dL Right sided filling pressures are normal. Left sided filling pressures are normal. Normal PA pressures. Normal cardiac output level.        3/18/25; baseline at 5000 rpm   Each iteration we increased by 100 rpm, to 5100, 5200, 5300. .  PCWP decreased by 1 each speed increase, with PA sat increasing by 2% each time.        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 NYHA Class III.        Fluid status: mostly has been hypo-volemic, and is now on an aggressive oral hydration regimen.  He is advised to have fluid intake >120 ounce per day, mainly Gatorade/water, avoid soda,   ACEi/ARB/ARNi: off losartan due to hypotension  Vasodilator: hydralazine 25 mg q8h.  amlodipine 5 mg daily --> may need further reduction in the future  BB: deferred due to recent VAD. Sometimes misses mid day dose hydralazine  Aldosterone antagonist: STOPPED blane previously due to hypoNa  SGLT2i: STOPPED empagliflozin 10 mg previously due to propensity for hypovolemia    Also advised to increase salt intake in the food, salty snacks 3 times per day and increase added salt to the food.  As a next step may also need to consider adding salt tablets.  We discussed that all of this is the opposite of what we do for heart failure patients, but since his LVAD implant, he has robust urinary output and is auto diuresing, and with persistent low flow alarms, speed drops, dizziness, hypotension -would benefit from increased fluid and salt intake.  We discussed that none of these are perfect and may need continued adjustments and close monitoring based on his clinical status.    With above plans, he initially improved with no low flow alarms since 12/2024, improving lightheadedness.  His MAPS at home and in cardiac rehab are in the high 60s to low 70s.    SCD prophylaxis: ICD  MAP: goal 70-90, encouraging POs ulitize hold parameters. MAPs are in the upper 60s-upper 70s.   LDH trends: stable   Anticoagulation: warfarin dosing per pharmacy, INR goal  2-3  Antiplatelet: ASA not indicated in LVAD population, > 6 months s/p STEMI    Other:   - No fluid restriction, encourage drinks with Na/electrolytes, increase fluid intake to greater than 120 ounce per day, eat frequent salty snacks  - Patient has a low flow controller, will not alarm until flow <2. Hematocrit is set to match his true hematocrit  - Dr. Smith has reviewed inflow and outflow positioning, nothing to do    Left ventricular assist device interrogation:  Speed: 5000   Flow: 3.2   Power 3.2   PI: 6.7    # Low flow alarms  # Elevated PIs  # Labile MAPs   # Suspected orthostatic hypotension, resolved  Low flows seems releated to hypertension and some hypovolemia as well. Have improved with re-timing hydralazine and after increasing oral fluids. CTA was done on  10/17/24 with no outflow graft ostruction. The outflow graft does make an acute angle as it approaches the aorta, discussed with Dr. Mulvihill, unlikely to be impacting the low flows at this time.  RHC with reasonable filling pressures as above. He has been asymptomatic.  - Decreased speed to 5000 on 10/27  - Losartan stopped previously for low BP  - SGLT2 inihibitor stopped for hypovolemia  - Hydralazine and amlodipine doses reduced as above  - Stopped fludrocortisone 0.1 mg daily previously  - Increase PO intake as above, specifically right before bed, non free-water sources due to hypoNa  - Temp drop in hematocrit on monitor 10/29-10/30, low-flow controller change out 10/30, changed hematocrit back 10/31    # History of VT/VF arrest 2023  # AFib s/p DCCV 9/2024 and again 9/30/2024   Successfully cardioverted in early September for AF. Since then EKG suggested AF on 9/21. Tele is only available from as early as 9/22. The patient was started on hep gtt 9/21, but unclear if was in AF before this. While the patient was on hep gtt until INR was therapeutic, it is not possible to assess rhythm prior to 9/21. Systemic AC was off on 9/18-9/20. S/p  "MELBA and DCCV on 9/30 with conversion to sinus rhythm.  10/23 device interrogation shows persistent episode of AF since 10/10 with rates   - Continue amiodarone 200 mg daily  - S/p digoxin load, continue digoxin 62.5 mcg daily   - Ongoing a fib- given ongoing low flows- deferring cardioversion for now given reasonable rate control.   - AC as above     # Hypovolemia hyponatremia, stable  improving  - encourage high sodium PO intake/gatorade and salty foods     # Visual changes, resolved.   # Generalized weakness, improved   # Headache, resolved  Stoke code called 9/29 for visual changes - right eye with decreased upper half of vision and bluish haze. Resolved after 30 minutes. Seen by opthalmology and neuro. Negative head CT and CTA head and neck. He has had 3 episodes which last about 5 minutes before resolving completely.      On 10/9 Stroke code activated due to concern of generalized weakness, numbness and headache. LKW reported as 0715 when he worked with therapies and shortly after started feeling generally weak. Then around 1500 he had onset of moderate \"tension\" type headache and tingling in the bilateral fingertips. Per [facility]RT [facility] patient now reports tingling has resolved and headache is improving. MAP 48, INR 2.2. Given absence of focal deficits and rapidly improving symptoms, opted to cancel stroke.  CT head negative, though did show chronic maxillary sinusitis.      Stroke code called 10/23 when seen in clinic with report of new RUE weakness. He was notably hypotensive at this time with MAP ~45 with new right pronator drift. Evaluated in ED, CT and CTA head and neck negative. RUE weakness resolved.   - monitor for changes- no current symptoms    Started cardiac rehab Jan 2025     # Cognitive changes  - Formal neuropsych testing 02/2025 admission revealed some cognitive changes  - 24/7 care during his first 30 days, will likely not need therafter, but continue to assess with each LVAD appointment (per " Ho Méndez note from 11/8 [place] has passed his VAD education)  - Needs formal driving assessment with DMV or OT prior to being cleared to drive, wife aware and also discussed with patient at time of discharge  - Repeat neuropsych testing in 1 year  - Referred to behavioral neurology in place    Lizandro Marx MD  Cardiology Fellow, PGY-6  This note was created using Dragon dictation software, so please excuse any mistakes and incorrect syntax and semantics.    Attending attestation  I have seen and discussed the patient with Dr. Marx and edited the note above to reflect our joint history, exam, assessment and plan    I spent 44 minutes in care of the patient today including obtaining recent medical history, personally reviewing recent cardiac testing and/or lab results, today's examination, discussion of testing results and care recommendations with patient.     Thank you for the opportunity to participate in this patient's care. Please feel free to reach out with any questions or concerns.     Ham Cruz MD, [facility]  Associate Professor of Medicine  Advanced Heart Failure, LVAD & Cardiac Transplant  Director, Cardio-Obstetrics  Cardio-Oncology  [facility]

## 2025-03-22 LAB
COTININE SERPL-MCNC: <5 NG/ML
NICOTINE SERPL-MCNC: <5 NG/ML

## 2025-03-24 ENCOUNTER — ANCILLARY PROCEDURE (OUTPATIENT)
Dept: CARDIOLOGY | Facility: CLINIC | Age: 75
End: 2025-03-24
Attending: INTERNAL MEDICINE
Payer: MEDICARE

## 2025-03-24 VITALS — WEIGHT: 158.4 LBS | OXYGEN SATURATION: 99 % | BODY MASS INDEX: 25.57 KG/M2 | SYSTOLIC BLOOD PRESSURE: 86 MMHG

## 2025-03-24 DIAGNOSIS — I25.5 ISCHEMIC CARDIOMYOPATHY: ICD-10-CM

## 2025-03-24 DIAGNOSIS — Z95.811 LVAD (LEFT VENTRICULAR ASSIST DEVICE) PRESENT (H): ICD-10-CM

## 2025-03-24 DIAGNOSIS — R42 DIZZINESS: ICD-10-CM

## 2025-03-24 DIAGNOSIS — I50.42 CHRONIC COMBINED SYSTOLIC AND DIASTOLIC CONGESTIVE HEART FAILURE (H): Primary | ICD-10-CM

## 2025-03-24 DIAGNOSIS — I50.22 CHRONIC SYSTOLIC CONGESTIVE HEART FAILURE (H): ICD-10-CM

## 2025-03-24 LAB
MDC_IDC_LEAD_CONNECTION_STATUS: NORMAL
MDC_IDC_LEAD_CONNECTION_STATUS: NORMAL
MDC_IDC_LEAD_IMPLANT_DT: NORMAL
MDC_IDC_LEAD_IMPLANT_DT: NORMAL
MDC_IDC_LEAD_LOCATION: NORMAL
MDC_IDC_LEAD_LOCATION: NORMAL
MDC_IDC_LEAD_LOCATION_DETAIL_1: NORMAL
MDC_IDC_LEAD_LOCATION_DETAIL_1: NORMAL
MDC_IDC_LEAD_MFG: NORMAL
MDC_IDC_LEAD_MFG: NORMAL
MDC_IDC_LEAD_MODEL: NORMAL
MDC_IDC_LEAD_MODEL: NORMAL
MDC_IDC_LEAD_POLARITY_TYPE: NORMAL
MDC_IDC_LEAD_POLARITY_TYPE: NORMAL
MDC_IDC_LEAD_SERIAL: NORMAL
MDC_IDC_LEAD_SERIAL: NORMAL
MDC_IDC_LEAD_SPECIAL_FUNCTION: NORMAL
MDC_IDC_LEAD_SPECIAL_FUNCTION: NORMAL
MDC_IDC_MSMT_BATTERY_DTM: NORMAL
MDC_IDC_MSMT_BATTERY_REMAINING_LONGEVITY: 150 MO
MDC_IDC_MSMT_BATTERY_REMAINING_PERCENTAGE: 100 %
MDC_IDC_MSMT_BATTERY_STATUS: NORMAL
MDC_IDC_MSMT_CAP_CHARGE_DTM: NORMAL
MDC_IDC_MSMT_CAP_CHARGE_TIME: 10.2 S
MDC_IDC_MSMT_CAP_CHARGE_TYPE: NORMAL
MDC_IDC_MSMT_LEADCHNL_RA_IMPEDANCE_VALUE: 558 OHM
MDC_IDC_MSMT_LEADCHNL_RA_PACING_THRESHOLD_AMPLITUDE: 0.7 V
MDC_IDC_MSMT_LEADCHNL_RA_PACING_THRESHOLD_PULSEWIDTH: 0.4 MS
MDC_IDC_MSMT_LEADCHNL_RV_IMPEDANCE_VALUE: 363 OHM
MDC_IDC_MSMT_LEADCHNL_RV_PACING_THRESHOLD_AMPLITUDE: 0.7 V
MDC_IDC_MSMT_LEADCHNL_RV_PACING_THRESHOLD_PULSEWIDTH: 0.4 MS
MDC_IDC_PG_IMPLANT_DTM: NORMAL
MDC_IDC_PG_MFG: NORMAL
MDC_IDC_PG_MODEL: NORMAL
MDC_IDC_PG_SERIAL: NORMAL
MDC_IDC_PG_TYPE: NORMAL
MDC_IDC_SESS_CLINIC_NAME: NORMAL
MDC_IDC_SESS_DTM: NORMAL
MDC_IDC_SESS_TYPE: NORMAL
MDC_IDC_SET_BRADY_AT_MODE_SWITCH_MODE: NORMAL
MDC_IDC_SET_BRADY_AT_MODE_SWITCH_RATE: 170 {BEATS}/MIN
MDC_IDC_SET_BRADY_LOWRATE: 60 {BEATS}/MIN
MDC_IDC_SET_BRADY_MAX_SENSOR_RATE: 130 {BEATS}/MIN
MDC_IDC_SET_BRADY_MAX_TRACKING_RATE: 130 {BEATS}/MIN
MDC_IDC_SET_BRADY_MODE: NORMAL
MDC_IDC_SET_BRADY_PAV_DELAY_HIGH: 200 MS
MDC_IDC_SET_BRADY_PAV_DELAY_LOW: 300 MS
MDC_IDC_SET_BRADY_SAV_DELAY_HIGH: 200 MS
MDC_IDC_SET_BRADY_SAV_DELAY_LOW: 300 MS
MDC_IDC_SET_LEADCHNL_RA_PACING_AMPLITUDE: 2 V
MDC_IDC_SET_LEADCHNL_RA_PACING_CAPTURE_MODE: NORMAL
MDC_IDC_SET_LEADCHNL_RA_PACING_POLARITY: NORMAL
MDC_IDC_SET_LEADCHNL_RA_PACING_PULSEWIDTH: 0.4 MS
MDC_IDC_SET_LEADCHNL_RA_SENSING_ADAPTATION_MODE: NORMAL
MDC_IDC_SET_LEADCHNL_RA_SENSING_POLARITY: NORMAL
MDC_IDC_SET_LEADCHNL_RA_SENSING_SENSITIVITY: 0.25 MV
MDC_IDC_SET_LEADCHNL_RV_PACING_AMPLITUDE: 2 V
MDC_IDC_SET_LEADCHNL_RV_PACING_CAPTURE_MODE: NORMAL
MDC_IDC_SET_LEADCHNL_RV_PACING_POLARITY: NORMAL
MDC_IDC_SET_LEADCHNL_RV_PACING_PULSEWIDTH: 0.4 MS
MDC_IDC_SET_LEADCHNL_RV_SENSING_ADAPTATION_MODE: NORMAL
MDC_IDC_SET_LEADCHNL_RV_SENSING_POLARITY: NORMAL
MDC_IDC_SET_LEADCHNL_RV_SENSING_SENSITIVITY: 0.6 MV
MDC_IDC_SET_ZONE_DETECTION_INTERVAL: 250 MS
MDC_IDC_SET_ZONE_DETECTION_INTERVAL: 300 MS
MDC_IDC_SET_ZONE_DETECTION_INTERVAL: 353 MS
MDC_IDC_SET_ZONE_STATUS: NORMAL
MDC_IDC_SET_ZONE_TYPE: NORMAL
MDC_IDC_SET_ZONE_VENDOR_TYPE: NORMAL
MDC_IDC_STAT_AT_BURDEN_PERCENT: 0 %
MDC_IDC_STAT_AT_DTM_END: NORMAL
MDC_IDC_STAT_AT_DTM_START: NORMAL
MDC_IDC_STAT_BRADY_DTM_END: NORMAL
MDC_IDC_STAT_BRADY_DTM_START: NORMAL
MDC_IDC_STAT_BRADY_RA_PERCENT_PACED: 24 %
MDC_IDC_STAT_BRADY_RV_PERCENT_PACED: 1 %
MDC_IDC_STAT_EPISODE_RECENT_COUNT: 0
MDC_IDC_STAT_EPISODE_RECENT_COUNT_DTM_END: NORMAL
MDC_IDC_STAT_EPISODE_RECENT_COUNT_DTM_START: NORMAL
MDC_IDC_STAT_EPISODE_TYPE: NORMAL
MDC_IDC_STAT_EPISODE_VENDOR_TYPE: NORMAL
MDC_IDC_STAT_TACHYTHERAPY_ATP_DELIVERED_RECENT: 0
MDC_IDC_STAT_TACHYTHERAPY_ATP_DELIVERED_TOTAL: 0
MDC_IDC_STAT_TACHYTHERAPY_RECENT_DTM_END: NORMAL
MDC_IDC_STAT_TACHYTHERAPY_RECENT_DTM_START: NORMAL
MDC_IDC_STAT_TACHYTHERAPY_SHOCKS_ABORTED_RECENT: 0
MDC_IDC_STAT_TACHYTHERAPY_SHOCKS_ABORTED_TOTAL: 0
MDC_IDC_STAT_TACHYTHERAPY_SHOCKS_DELIVERED_RECENT: 0
MDC_IDC_STAT_TACHYTHERAPY_SHOCKS_DELIVERED_TOTAL: 0
MDC_IDC_STAT_TACHYTHERAPY_TOTAL_DTM_END: NORMAL
MDC_IDC_STAT_TACHYTHERAPY_TOTAL_DTM_START: NORMAL

## 2025-03-24 PROCEDURE — 99215 OFFICE O/P EST HI 40 MIN: CPT | Mod: GC | Performed by: INTERNAL MEDICINE

## 2025-03-24 PROCEDURE — 93750 INTERROGATION VAD IN PERSON: CPT | Performed by: INTERNAL MEDICINE

## 2025-03-24 PROCEDURE — 1126F AMNT PAIN NOTED NONE PRSNT: CPT | Performed by: INTERNAL MEDICINE

## 2025-03-24 PROCEDURE — 93283 PRGRMG EVAL IMPLANTABLE DFB: CPT | Performed by: INTERNAL MEDICINE

## 2025-03-24 PROCEDURE — G0463 HOSPITAL OUTPT CLINIC VISIT: HCPCS | Performed by: INTERNAL MEDICINE

## 2025-03-24 ASSESSMENT — PAIN SCALES - GENERAL: PAINLEVEL_OUTOF10: NO PAIN (0)

## 2025-03-24 NOTE — PATIENT INSTRUCTIONS
" [facility]  Take your medications every day, as directed!  Medication changes made today:  No medication changes Instructions:  Please continue to double anchor. Do not force the driveline in and out.   Send Ho photos of the LVAD drive line exit site if you have drainage or bright red skin Monitor your heart failure, Page the VAD Coordinator on call:  If you gain more than 3 lb in a day or 5 in a week  If you feel worsening shortness of breath, palpitations, or swelling.   If you have VAD alarms or change in parameters  If you feel dizzy or lightheaded     Keep your VAD appointments!    Please make an appointment with VAD URIAH in 3 months. Dr. Cruz in September      Please see the clinic schedulers after your appointment to schedule follow-up.    If you do not have an appointment scheduled, you need to call the VAD  at [phone]. To Page the VAD Coordinator on call, dial [phone] option #[phone] and ask to speak to the VAD coordinator on call. Try to maintain a heart healthy lifestyle!  Limit salt & sodium to 2000mg/day   Eat a heart healthy diet, low in saturated fats  Stay active! Aim to move at least 150 minutes every week.    Use [employer] allows you to communicate directly with your heart team through secure messaging.  light can be accessed any time on your phone, computer, or tablet.  If you need assistance, we'd be happy to help!      Equipment Reminders:   Charge your back-up controller at least every 6 months. To charge, connect it to either batteries or wall power. The screen will read \"Charging\". Keep connected until the screen reads \"charging complete\". Disconnect power once the controller battery is charged. Also do a self-test when the controller is connected to power.  Replace the AA batteries in your mobile power unit every 6 months.  Check your battery charger for when it is due for maintenance. It requires inspection in clinic once per year. There will " be a sticker stating the month and year maintenance is due. When you bring your battery charger to clinic, tell one of the schedulers you have LVAD equipment that needs maintenance. They will call [facility]. You can leave your  under the LVAD sign by the  at the far end of clinic. You must drop it off before 2pm.

## 2025-03-24 NOTE — LETTER
3/24/2025      RE: Duane C Johnson  [id number] [address] [facility] [address]       Dear Colleague,    Thank you for the opportunity to participate in the care of your patient, Duane C Johnson, at the [facility] HEART [facility]. Please see a copy of my visit note below.    [facility]  Advanced Heart Failure Clinic    Patient Name: Duane C Johnson   : 1950   Date of Visit: 2025    Primary Care Physician: Jose Coles MD     Referring Physician: No ref. provider found   Reason for Visit: LVAD    Dear Dr. Sanket MD     I had the pleasure of seeing Duane C Johnson today in the [facility] Advanced [facility] Failure [facility]. As you know Duane C Johnson is a pleasant 74 year old year old male, who presents today for further evaluation of LVAD.    Duane has a history of end-stage cardiomyopathy, history of coronary artery disease, previous STEMI, history of VT VF arrest, status post placement of ICD, history of A-fib a flutter with previous cardioversion, status post HeartMate 3 LVAD implantation 2024.    Duane was discharged to ARU on 10/5/24. There, he continued to have frequent low flow alarms with labile BPs and has required frequent adjustments to BP medications, initiation of florinef, as well as IVF and increased PO intake. Seen in clinic on 10/23 where he was presyncopal with MAPs in 40s. He was sent from clinic to ED for further evaluation. Admitted 10/23/24 for further workup of hypotension, hypovolemia, and low flows. Underwent stroke eval for neurological changes with negative CT. Symptoms improved following 2L IVF and improved MAPs. He was resumed on low doses of GDMT/afterload reduction. During this hospitalization continued to have several more runs of low flows. MAPs/meds optimized and prior to discharge he was without low flows/high PIs x 3 days. Controlled was also switched to low flow controlled.  Discharge was delayed due to needing additional LVAD education. Consulted neuropsych due to concerns for cognitive changes - Alzheimer's disease or possibly mixed pathology (mild cognitive impairment).     Following discharge, he presented to the emergency room on 11/10/2024 with witnessed syncopal episode, feeling lightheaded which led to a fall to the ground but he did not strike his head or suffer any injury.  He reported that he did not lose consciousness although EMS reported that family witnessed a brief loss of consciousness episode.  The ER spoke to Dr. Nicolas, who suggested aggressive hydration as he is known to have hypovolemia and orthostasis and he had not had much to eat or drink that day before this episode. He did not have a low flow alarm then.    We have been seeing him in clinic frequently, and have mostly cut back on some of his anti hypertensives and working on a plan of aggressive hydration. He saw me last on 1/6/25, when he was doing well    2/3/25 clinic visit  Today he reports blood in stool, fatigue, generalized weakness, dizziness/shakiness today. He sat down on the floor next to the bed as he went to lay down.  Last visit his lightheadedness was better, but now is worse for 4-5 days. He had labs on Thursday when Hb was stable but symptoms have worsened.  He has red blood in stool, and thinks this is a very small amount. He has normal bowel movements.    He is drinking a lot (gatorade), and he and his wife confirm that he is drinking at least 100 - 120 oz of fluid per day water/ gatorade, milk, one 14 oz of Core 42 protein drink, has cut down on mountain dew to less than one per day. His low flow alarm limit is set to 2L/min, last alarm was on 12/8/24, very brief.    He is mostly not using his walker, and reports he is using it if walking further than just going from couch to bathroom (about 20 feet) when he holds on to walls/doorways. His wife is with him 24x7, she works from home.    He  has started cardiac rehab Jan 2025, going MWF    Home BPs - Doppler at home MAPs in 86-90 mm Hg range  Home weights - not checking    3/24/25 clinic visit:   Last seen 2/3/25.   Admitted from 2/5 - 2/7/25 for hematochezia, dizziness and weakness. INR was found to be super therapeutic to 3.61 on admission. Flex sig showed radiation proctitis with no treatable lesion. Prescribed sucralfate enemas.    Lower GI bleeding has since resolved     3/17/25 ED encounter for fall in the setting of syncope w/ head trauma. CT head negative for any intracranial bleeding but was noted to have a small scalp hematoma.    There is no interim history of chest pain, tightness, changing exercise tolerance, paroxysmal nocturnal dyspnea, orthopnea, dependent edema, palpitation, headaches, acute vision changes, fevers, chills, cough, sore throat, and signs of bleeding.     He continues to have some episodes of lightheadedness some of which are such that that he recognizes them quickly and sits down to avoid fall and injury.  He has had a couple episodes more recently where they come on pretty quickly including the 1 with syncope and head trauma last week.    Continues to go to cardiac rehab Monday Wednesday and Friday and is tolerating most of the exercises well with the exception of the treadmill; he notes that he will become lightheaded pretty quickly while on the treadmill.  So he is not going into treadmill anymore but walks with a walker and does other exercises.  He can walk to the end of his driveway and back which is total about 2000 feet to get the mail, but will need to use a chair to rest [facility] through.    Patient reports that maps that cardiac rehab have been in the upper 70s, the opposite home have been in the upper 60s to low 70s.     He had a right heart cath and echocardiogram last week which are noted below.    ROS:  A complete 10-point ROS was negative except as above.    PAST MEDICAL HISTORY:  Past Medical History:    Diagnosis Date     Benign essential hypertension      CAD (coronary artery disease)      Chronic systolic heart failure (H)      Hypertension      ST elevation myocardial infarction (STEMI), unspecified artery (H)      Ventricular fibrillation (H)        PAST SURGICAL HISTORY:  Past Surgical History:   Procedure Laterality Date     ANESTHESIA CARDIOVERSION N/A 9/5/2024    Procedure: Anesthesia cardioversion;  Surgeon: GENERIC ANESTHESIA PROVIDER;  Location: [facility]     ANESTHESIA CARDIOVERSION N/A 9/30/2024    Procedure: Anesthesia cardioversion;  Surgeon: GENERIC ANESTHESIA PROVIDER;  Location: [facility] OR     COLONOSCOPY N/A 3/23/2023    Procedure: COLONOSCOPY, FLEXIBLE, WITH LESION REMOVAL USING SNARE;  Surgeon: Jose Faustin MD;  [facility]: [facility]     CV CENTRAL VENOUS CATHETER PLACEMENT N/A 10/26/2023    Procedure: Central Venous Catheter Placement;  Surgeon: Rob Lyles MD;  Location:  HEART CARDIAC CATH LAB     CV CORONARY ANGIOGRAM N/A 10/27/2023    Procedure: Coronary Angiogram;  Surgeon: Rob Lyles MD;  Location:  HEART CARDIAC CATH LAB     CV CORONARY ANGIOGRAM N/A 10/26/2023    Procedure: Coronary Angiogram;  Surgeon: Rob Lyles MD;  Location:  HEART CARDIAC CATH LAB     CV LEFT HEART CATH N/A 10/26/2023    Procedure: Left Heart Catheterization;  Surgeon: Rob Lyles MD;  Location:  HEART CARDIAC CATH LAB     CV PCI N/A 10/27/2023    Procedure: Percutaneous Coronary Intervention;  Surgeon: Rob Lyles MD;  Location:  HEART CARDIAC CATH LAB     CV PCI STENT DRUG ELUTING N/A 10/26/2023    Procedure: Percutaneous Coronary Intervention Stent;  Surgeon: Rob Lyles MD;  Location:  HEART CARDIAC CATH LAB     CV RIGHT HEART CATH MEASUREMENTS RECORDED N/A 5/6/2024    Procedure: Heart Cath Right Heart Cath;  Surgeon: Rob Lyles MD;  Location:  HEART CARDIAC CATH LAB     CV RIGHT HEART CATH MEASUREMENTS RECORDED N/A  9/3/2024    Procedure: Right Heart Catheterization;  Surgeon: Aaron Mesa MD;  Location:  HEART CARDIAC CATH LAB     CV RIGHT HEART CATH MEASUREMENTS RECORDED N/A 10/24/2024    Procedure: Right Heart Catheterization;  Surgeon: Haile Braden MD;  Location:  HEART CARDIAC CATH LAB     CV RIGHT HEART CATH MEASUREMENTS RECORDED N/A 3/18/2025    Procedure: Right Heart Catheterization with VAD Speed Optimization (no echo);  Surgeon: Aaron Mesa MD;  Location:  HEART CARDIAC CATH LAB     EP ICD INSERT SINGLE N/A 5/8/2024    Procedure: Implantable Cardioverter Defibrillator Device & Lead Implant Dual;  Surgeon: Guero Black MD;  Location:  HEART CARDIAC CATH LAB     INJECTION, HYDROGEL SPACER N/A 4/22/2024    Procedure: INJECTION, HYDROGEL SPACER AND FIDUCIAL MARKER PLACEMENT;  Surgeon: Stanislaw Barros MD;  Location: [facility]     INSERT VENTRICULAR ASSIST DEVICE LEFT (HEARTMATE II) N/A 9/18/2024    Procedure: Median Sternotomy, INSERTION of LEFT VENTRICULAR ASSIST DEVICE (HEARTMATE III), cardiopulmonary bypass, transesophageal echocardiogram performed by anesthesia.;  Surgeon: Mulvihill, Michael, MD;  Location: [facility] OR     IRRIGATION AND DEBRIDEMENT CHEST WASHOUT, COMBINED Right 9/18/2024    Procedure: Exploration of chest, chest washout;  Surgeon: Mulvihill, Michael, MD;  Location: [facility] OR     SIGMOIDOSCOPY FLEXIBLE N/A 2/5/2025    Procedure: Sigmoidoscopy flexible;  Surgeon: Jp Cartwright MD;  Location: [facility] GI       FAMILY HISTORY:  Family History   Problem Relation Age of Onset     Other Cancer Mother      Obesity Mother      GI problems Father      Uterine Cancer Sister        SOCIAL HISTORY:  Social History     Socioeconomic History     Marital status:      Spouse name: None     Number of children: None     Years of education: None     Highest education level: None   Tobacco Use     Smoking status: Former     Current packs/day: 0.00     Average packs/day: 0.3  packs/day for 30.0 years (7.5 ttl pk-yrs)     Types: Cigarettes     Start date: 10/26/1993     Quit date: 10/26/2023     Years since quittin.0     Passive exposure: Past     Smokeless tobacco: Never     Tobacco comments:     Quit 10/26/2023   Vaping Use     Vaping status: Never Used   Substance and Sexual Activity     Alcohol use: Yes     Comment: 1-2 beers per day     Drug use: No     Sexual activity: Yes     Partners: Female     Birth control/protection: None   Other Topics Concern     Parent/sibling w/ CABG, MI or angioplasty before 65F 55M? No     Social Drivers of Health     Financial Resource Strain: Low Risk  (10/23/2024)    Financial Resource Strain      Within the past 12 months, have you or your family members you live with been unable to get utilities (heat, electricity) when it was really needed?: No   Food Insecurity: Low Risk  (10/23/2024)    Food Insecurity      Within the past 12 months, did you worry that your food would run out before you got money to buy more?: No      Within the past 12 months, did the food you bought just not last and you didn t have money to get more?: No   Transportation Needs: Low Risk  (10/23/2024)    Transportation Needs      Within the past 12 months, has lack of transportation kept you from medical appointments, getting your medicines, non-medical meetings or appointments, work, or from getting things that you need?: No   Physical Activity: Inactive (2023)    Exercise Vital Sign      Days of Exercise per Week: 0 days      Minutes of Exercise per Session: 0 min   Social Connections: Unknown (2023)    Social Connection and Isolation Panel [NHANES]      Marital Status:    Interpersonal Safety: Low Risk  (10/23/2024)    Interpersonal Safety      Do you feel physically and emotionally safe where you currently live?: Yes      Within the past 12 months, have you been hit, slapped, kicked or otherwise physically hurt by someone?: No      Within the past 12  months, have you been humiliated or emotionally abused in other ways by your partner or ex-partner?: No   Recent Concern: Interpersonal Safety - High Risk (10/17/2024)    Interpersonal Safety      Do you feel physically and emotionally safe where you currently live?: No      Within the past 12 months, have you been hit, slapped, kicked or otherwise physically hurt by someone?: No      Within the past 12 months, have you been humiliated or emotionally abused in other ways by your partner or ex-partner?: No   Housing Stability: Low Risk  (10/23/2024)    Housing Stability      Do you have housing? : Yes      Are you worried about losing your housing?: No   Recent Concern: Housing Stability - High Risk (9/1/2024)    Housing Stability      Do you have housing? : No      Are you worried about losing your housing?: No       MEDICATIONS:  Current Outpatient Medications   Medication Sig Dispense Refill     acetaminophen (TYLENOL) 325 MG tablet Take 2 tablets (650 mg) by mouth every 4 hours as needed for other (For optimal non-opioid multimodal pain management to improve pain control.).       amiodarone (PACERONE) 200 MG tablet Take 1 tablet (200 mg) by mouth daily. 90 tablet 3     amLODIPine (NORVASC) 10 MG tablet Take 0.5 tablets (5 mg) by mouth daily. 45 tablet 3     atorvastatin (LIPITOR) 80 MG tablet Take 1 tablet (80 mg) by mouth every evening. 90 tablet 3     digoxin (LANOXIN) 62.5 MCG tablet Take 1 tablet (62.5 mcg) by mouth daily. 90 tablet 0     escitalopram (LEXAPRO) 10 MG tablet Take 1 tablet (10 mg) by mouth or Feeding Tube daily. 90 tablet 3     gabapentin (NEURONTIN) 100 MG capsule Take 1 capsule (100 mg) by mouth or Feeding Tube at bedtime. 90 capsule 3     hydrALAZINE (APRESOLINE) 25 MG tablet Take 1 tablet (25 mg) by mouth 2 times daily. 180 tablet 3     magnesium hydroxide (MILK OF MAGNESIA) 400 MG/5ML suspension Take 30 mLs by mouth daily as needed for constipation (Use if polyethylene glycol (Miralax) is  not effective after 24 hours.). 354 mL 0     magnesium oxide (MAG-OX) 400 MG tablet Take 1 tablet (400 mg) by mouth daily. 90 tablet 3     melatonin 10 MG TABS tablet Take 1 tablet (10 mg) by mouth every evening.       multivitamin w/minerals (THERA-VIT-M) tablet Take 1 tablet by mouth daily. 90 tablet 3     warfarin ANTICOAGULANT (COUMADIN) 2.5 MG tablet Take 0.5 tablets (1.25 mg) by mouth daily.       No current facility-administered medications for this visit.        ALLERGIES:     Allergies   Allergen Reactions     Brilinta [Ticagrelor] Other (See Comments) and Difficulty breathing     Per pt and spouse, hyperventilation.     Clonazepam Other (See Comments)     Per spouse, acted like a zombie and he was shaky, could barely talk.       PHYSICAL EXAM:  BP (!) 86/0 (BP Location: Right arm, Patient Position: Chair, Cuff Size: Adult Regular)   Wt 71.8 kg (158 lb 6.4 oz)   SpO2 99%   BMI 25.57 kg/m      General: Well-nourished, well-developed, NAD   Chest: Normal work of breathing. clear to ausculation.   Cardio: VAD hum. Drive line c/d/I. Minimal crusting   Abdomen: Driveline dressing removed and driveline site examined  Extremities: Edema not present      LABS:  CMP  Last Comprehensive Metabolic Panel:  Sodium   Date Value Ref Range Status   03/18/2025 132 (L) 135 - 145 mmol/L Final   05/26/2021 141 133 - 144 mmol/L Final     Sodium Whole Blood   Date Value Ref Range Status   10/27/2023 138 135 - 145 mmol/L Final     Potassium   Date Value Ref Range Status   03/18/2025 3.8 3.4 - 5.3 mmol/L Final   05/26/2021 4.5 3.4 - 5.3 mmol/L Final     Potassium Whole Blood   Date Value Ref Range Status   09/23/2024 2.5 (LL) 3.4 - 5.3 mmol/L Final     Potassium POCT   Date Value Ref Range Status   10/23/2024 3.8 3.4 - 5.3 mmol/L Final     Chloride   Date Value Ref Range Status   03/18/2025 97 (L) 98 - 107 mmol/L Final   05/26/2021 105 94 - 109 mmol/L Final     Chloride Whole Blood   Date Value Ref Range Status   10/27/2023 103  94 - 109 mmol/L Final     Carbon Dioxide   Date Value Ref Range Status   05/26/2021 29 20 - 32 mmol/L Final     Carbon Dioxide (CO2)   Date Value Ref Range Status   03/18/2025 24 22 - 29 mmol/L Final     Carbon Dioxide Whole Blood   Date Value Ref Range Status   10/27/2023 27 20 - 32 mmol/L Final     Anion Gap   Date Value Ref Range Status   03/18/2025 11 7 - 15 mmol/L Final   05/26/2021 7 3 - 14 mmol/L Final     Glucose   Date Value Ref Range Status   03/18/2025 84 70 - 99 mg/dL Final   05/26/2021 125 (H) 70 - 99 mg/dL Final     GLUCOSE BY METER POCT   Date Value Ref Range Status   11/09/2024 111 (H) 70 - 99 mg/dL Final     Urea Nitrogen   Date Value Ref Range Status   03/18/2025 20.8 8.0 - 23.0 mg/dL Final   05/26/2021 41 (H) 7 - 30 mg/dL Final     Creatinine   Date Value Ref Range Status   03/18/2025 0.94 0.67 - 1.17 mg/dL Final   05/26/2021 0.80 0.66 - 1.25 mg/dL Final     GFR Estimate   Date Value Ref Range Status   03/18/2025 85 >60 mL/min/1.73m2 Final     Comment:     eGFR calculated using 2021 CKD-EPI equation.   05/26/2021 90 >60 mL/min/[1.73_m2] Final     Comment:     Non  GFR Calc  Starting 12/18/2018, serum creatinine based estimated GFR (eGFR) will be   calculated using the Chronic Kidney Disease Epidemiology Collaboration   (CKD-EPI) equation.       GFR, ESTIMATED POCT   Date Value Ref Range Status   10/23/2024 >60 >60 mL/min/1.73m2 Final     Calcium   Date Value Ref Range Status   03/18/2025 9.2 8.8 - 10.4 mg/dL Final   05/26/2021 8.3 (L) 8.5 - 10.1 mg/dL Final     Bilirubin Total   Date Value Ref Range Status   03/18/2025 0.3 <=1.2 mg/dL Final   03/20/2018 0.5 0.2 - 1.3 mg/dL Final     Alkaline Phosphatase   Date Value Ref Range Status   03/18/2025 113 40 - 150 U/L Final   03/20/2018 82 40 - 150 U/L Final     ALT   Date Value Ref Range Status   03/18/2025 24 0 - 70 U/L Final   03/20/2018 22 0 - 70 U/L Final     AST   Date Value Ref Range Status   03/18/2025 30 0 - 45 U/L Final  "  03/20/2018 20 0 - 45 U/L Final       NT-proBNP       LDH      TROP  No results found for: \"TROPI\", \"TROPONIN\", \"TROPR\", \"TROPN\"    CBC  CBC RESULTS:   Recent Labs   Lab Test 11/13/24  0910   WBC 5.4   RBC 3.49*   HGB 10.6*   HCT 31.3*   MCV 90   MCH 30.4   MCHC 33.9   RDW 15.1*          LIPIDS  Recent Labs   Lab Test 09/04/24  0626 08/31/24  0414   CHOL 85 83   HDL 29* 25*   LDL 45 46   TRIG 56 59       TSH  TSH   Date Value Ref Range Status   03/18/2025 2.91 0.30 - 4.20 uIU/mL Final       HBA1C  Lab Results   Component Value Date    A1C 5.4 09/18/2024    A1C 4.8 03/13/2023     CARDIAC DATA:    EKG:  10/23/24: Atrial fibrillation, Left anterior fascicular block, Possible Lateral infarct , age undetermined     Echo:  10/23/24  LVAD cannula was seen in the expected anatomic position in the LV apex.  HM3 at 5100RPM.  LVIDd 47mm.  Septum normal.  Aortic valve open intermittently. No AI.  Normal flow velocities.  Global right ventricular function is mildly to moderately reduced.  Small circumferential pericardial effusion is present without any hemodynamic significance.    3/18/25   LVEDD is 5.2 cm. Septum is midline.  Global right ventricular function is mildly to moderately reduced. Mild right  ventricular dilation is present.  Aortic valve opens with every cardiac cycle. No aortic regurgitation is  present.  The right ventricular systolic pressure is 22mmHg above the right atrial  pressure.  The inferior vena cava was normal in size with preserved respiratory  variability.  Small circumferential pericardial effusion is present without any hemodynamic  significance. Chamber compression is not present; there is no evidence for  tamponade.   This study was compared with the study from 10/23/2024, LVEDD is mildly  larger.    Cardiac Catheterization:  [facility] 10/23/24  BSA 1.75 m2  /89/101 mmHg  RA --/--/6 mmHg  RV 30/3mmHg  PA 30/14/20 mmHg  PCWP --/--/9 mmHg  TD CO/CI 4.1/2.34   Goldy CO/CI 4.54/2.59   PVR 2.68 " (TD)  PA sat 65%   PCW Oximeter sat 95%  Hgb 10.5 g/dL Right sided filling pressures are normal. Left sided filling pressures are normal. Normal PA pressures. Normal cardiac output level.        3/18/25; baseline at 5000 rpm   Each iteration we increased by 100 rpm, to 5100, 5200, 5300. .  PCWP decreased by 1 each speed increase, with PA sat increasing by 2% each time.        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 NYHA Class III.        Fluid status: mostly has been hypo-volemic, and is now on an aggressive oral hydration regimen.  He is advised to have fluid intake >120 ounce per day, mainly Gatorade/water, avoid soda,   ACEi/ARB/ARNi: off losartan due to hypotension  Vasodilator: hydralazine 25 mg q8h.  amlodipine 5 mg daily --> may need further reduction in the future  BB: deferred due to recent VAD. Sometimes misses mid day dose hydralazine  Aldosterone antagonist: STOPPED blane previously due to hypoNa  SGLT2i: STOPPED empagliflozin 10 mg previously due to propensity for hypovolemia    Also advised to increase salt intake in the food, salty snacks 3 times per day and increase added salt to the food.  As a next step may also need to consider adding salt tablets.  We discussed that all of this is the opposite of what we do for heart failure patients, but since his LVAD implant, he has robust urinary output and is auto diuresing, and with persistent low flow alarms, speed drops, dizziness, hypotension -would benefit from increased fluid and salt intake.  We discussed that none of these are perfect and may need continued adjustments and close monitoring based on his clinical  status.    With above plans, he initially improved with no low flow alarms since 12/2024, improving lightheadedness.  His MAPS at home and in cardiac rehab are in the high 60s to low 70s.    SCD prophylaxis: ICD  MAP: goal 70-90, encouraging POs ulitize hold parameters. MAPs are in the upper 60s-upper 70s.   LDH trends: stable   Anticoagulation: warfarin dosing per pharmacy, INR goal 2-3  Antiplatelet: ASA not indicated in LVAD population, > 6 months s/p STEMI    Other:   - No fluid restriction, encourage drinks with Na/electrolytes, increase fluid intake to greater than 120 ounce per day, eat frequent salty snacks  - Patient has a low flow controller, will not alarm until flow <2. Hematocrit is set to match his true hematocrit  - Dr. Smith has reviewed inflow and outflow positioning, nothing to do    Left ventricular assist device interrogation:  Speed: 5000   Flow: 3.2   Power 3.2   PI: 6.7    # Low flow alarms  # Elevated PIs  # Labile MAPs   # Suspected orthostatic hypotension, resolved  Low flows seems releated to hypertension and some hypovolemia as well. Have improved with re-timing hydralazine and after increasing oral fluids. CTA was done on  10/17/24 with no outflow graft ostruction. The outflow graft does make an acute angle as it approaches the aorta, discussed with Dr. Mulvihill, unlikely to be impacting the low flows at this time.  RHC with reasonable filling pressures as above. He has been asymptomatic.  - Decreased speed to 5000 on 10/27  - Losartan stopped previously for low BP  - SGLT2 inihibitor stopped for hypovolemia  - Hydralazine and amlodipine doses reduced as above  - Stopped fludrocortisone 0.1 mg daily previously  - Increase PO intake as above, specifically right before bed, non free-water sources due to hypoNa  - Temp drop in hematocrit on monitor 10/29-10/30, low-flow controller change out 10/30, changed hematocrit back 10/31    # History of VT/VF arrest 2023  # AFib s/p DCCV 9/2024  "and again 9/30/2024   Successfully cardioverted in early September for AF. Since then EKG suggested AF on 9/21. Tele is only available from as early as 9/22. The patient was started on hep gtt 9/21, but unclear if was in AF before this. While the patient was on hep gtt until INR was therapeutic, it is not possible to assess rhythm prior to 9/21. Systemic AC was off on 9/18-9/20. S/p MELBA and DCCV on 9/30 with conversion to sinus rhythm.  10/23 device interrogation shows persistent episode of AF since 10/10 with rates   - Continue amiodarone 200 mg daily  - S/p digoxin load, continue digoxin 62.5 mcg daily   - Ongoing a fib- given ongoing low flows- deferring cardioversion for now given reasonable rate control.   - AC as above     # Hypovolemia hyponatremia, stable  improving  - encourage high sodium PO intake/gatorade and salty foods     # Visual changes, resolved.   # Generalized weakness, improved   # Headache, resolved  Stoke code called 9/29 for visual changes - right eye with decreased upper half of vision and bluish haze. Resolved after 30 minutes. Seen by opthalmology and neuro. Negative head CT and CTA head and neck. He has had 3 episodes which last about 5 minutes before resolving completely.      On 10/9 Stroke code activated due to concern of generalized weakness, numbness and headache. LKW reported as 0715 when he worked with therapies and shortly after started feeling generally weak. Then around 1500 he had onset of moderate \"tension\" type headache and tingling in the bilateral fingertips. Per RRT URIAH patient now reports tingling has resolved and headache is improving. MAP 48, INR 2.2. Given absence of focal deficits and rapidly improving symptoms, opted to cancel stroke.  CT head negative, though did show chronic maxillary sinusitis.      Stroke code called 10/23 when seen in clinic with report of new RUE weakness. He was notably hypotensive at this time with MAP ~45 with new right pronator drift. " Evaluated in ED, CT and CTA head and neck negative. RUE weakness resolved.   - monitor for changes- no current symptoms    Started cardiac rehab Jan 2025     # Cognitive changes  - Formal neuropsych testing 02/2025 admission revealed some cognitive changes  - 24/7 care during his first 30 days, will likely not need therafter, but continue to assess with each LVAD appointment (per Ho Méndez note from 11/8 Duane has passed his VAD education)  - Needs formal driving assessment with DMV or OT prior to being cleared to drive, wife aware and also discussed with patient at time of discharge  - Repeat neuropsych testing in 1 year  - Referred to behavioral neurology in place    Lizandro Marx MD  Cardiology Fellow, PGY-6  This note was created using Dragon dictation software, so please excuse any mistakes and incorrect syntax and semantics.    Attending attestation  I have seen and discussed the patient with Dr. Marx and edited the note above to reflect our joint history, exam, assessment and plan    I spent 44 minutes in care of the patient today including obtaining recent medical history, personally reviewing recent cardiac testing and/or lab results, today's examination, discussion of testing results and care recommendations with patient.     Thank you for the opportunity to participate in this patient's care. Please feel free to reach out with any questions or concerns.     Ham Cruz MD, [facility]  Associate Professor of Medicine  Advanced Heart Failure, LVAD & Cardiac Transplant  Director, Cardio-Obstetrics  Cardio-Oncology  [facility]        Please do not hesitate to contact me if you have any questions/concerns.     Sincerely,     Ham Cruz MD

## 2025-03-24 NOTE — NURSING NOTE
Chief Complaint   Patient presents with    Follow Up     RETURN VAD     Vitals were taken and medications reconciled.    Raymond Velasquez, EMT  2:49 PM

## 2025-03-24 NOTE — PATIENT INSTRUCTIONS
It was a pleasure to see you in clinic today.  Please do not hesitate to call with any questions or concerns.  You are scheduled for a remote transmission from home on 6/17/25.      Kaylee Rhodes, RN, MS, CCRN  Electrophysiology Nurse Clinician  [facility]    During Business Hours Please Call:  [phone]  After Hours Please Call:  [phone] - select option #4 and ask for job code [phone]

## 2025-03-24 NOTE — NURSING NOTE
MCS VAD Pump Info       Row Name 03/24/25 1400             MCS VAD Information    Implant LVAD      LVAD Pump HeartMate 3         Heartmate 3 LEFT VS    Flow (Lpm) 3.2 Lpm      Pulse Index (PI) 6.3 PI      Speed (rpm) 5000 rpm      Power (jackson) 3.2 jackson      Current Hct setting 32      Retired: Unexpected Alarms --         Primary Controller    Serial number [facility] [id number]      Low flow alarm setting 2.5      High watt alarm setting --      EBB: Patient use 12      Replace in 27 Months         Backup Controller    Serial number [facility] [id number]      EBB: Patient use 8      Replace EBB in 27 Months      Speed & HCT match primary controller --         VAD Interrogation    Alarms reported by patient N      Unexpected alarms noted upon interrogation None      PI events Frequent;w/ associated speed drops  1.9-7.3 48 hr history      Damage to equipment is noted N      Action taken Reviewed proper equipment care and maintenance         Driveline Exit Site    Dressing change done Y      Driveline properly secured No (patient re-educated)      DLES assessment other  yellow crusty; DLES moves in and out, reeducated      Dressing used Daily kit      Frequency patient changes dressing Q 3 days      Dressing modifications --      Dressing change supplier --                      Education Complete: Yes   Charge the BACKUP controller s backup battery every 6 months  Perform a self test on BACKUP every 6 months  Change the MPU s batteries every 6 months:Yes  Have equipment serviced yearly (if applicable):Yes    Reviewed laminated flashcard to be kept in back-up bag. Reviewed equipment names and functions.

## 2025-03-26 ENCOUNTER — TELEPHONE (OUTPATIENT)
Dept: ANTICOAGULATION | Facility: CLINIC | Age: 75
End: 2025-03-26
Payer: MEDICARE

## 2025-03-26 ENCOUNTER — HOSPITAL ENCOUNTER (OUTPATIENT)
Dept: CARDIAC REHAB | Facility: CLINIC | Age: 75
Discharge: HOME | End: 2025-03-26
Attending: SURGERY
Payer: MEDICARE

## 2025-03-26 PROCEDURE — 93798 PHYS/QHP OP CAR RHAB W/ECG: CPT

## 2025-03-26 NOTE — TELEPHONE ENCOUNTER
ANTICOAGULATION     Duane C Johnson is overdue for an INR check.     Spoke with Melissa  and scheduled lab appointment on 3/28/25    Namrata Walker, RN  3/26/2025  Anticoagulation Clinic  [facility] for routing messages: susannah MARTINEZ LVAD  ACC patient phone line: [phone]

## 2025-03-28 ENCOUNTER — HOSPITAL ENCOUNTER (OUTPATIENT)
Dept: CARDIAC REHAB | Facility: CLINIC | Age: 75
Discharge: HOME | End: 2025-03-28
Attending: SURGERY
Payer: MEDICARE

## 2025-03-28 PROCEDURE — 93798 PHYS/QHP OP CAR RHAB W/ECG: CPT | Mod: KX

## 2025-03-31 ENCOUNTER — HOSPITAL ENCOUNTER (OUTPATIENT)
Dept: CARDIAC REHAB | Facility: CLINIC | Age: 75
Discharge: HOME | End: 2025-03-31
Attending: SURGERY
Payer: MEDICARE

## 2025-03-31 PROCEDURE — 93798 PHYS/QHP OP CAR RHAB W/ECG: CPT | Mod: KX

## 2025-04-04 ENCOUNTER — HOSPITAL ENCOUNTER (OUTPATIENT)
Dept: CARDIAC REHAB | Facility: CLINIC | Age: 75
Discharge: HOME | End: 2025-04-04
Attending: SURGERY
Payer: MEDICARE

## 2025-04-04 PROCEDURE — 93798 PHYS/QHP OP CAR RHAB W/ECG: CPT | Mod: KX | Performed by: REHABILITATION PRACTITIONER

## 2025-04-07 ENCOUNTER — HOSPITAL ENCOUNTER (OUTPATIENT)
Dept: CARDIAC REHAB | Facility: CLINIC | Age: 75
Discharge: HOME | End: 2025-04-07
Attending: SURGERY
Payer: MEDICARE

## 2025-04-07 PROCEDURE — 93798 PHYS/QHP OP CAR RHAB W/ECG: CPT | Mod: KX

## 2025-04-08 ENCOUNTER — VIRTUAL VISIT (OUTPATIENT)
Dept: PALLIATIVE CARE | Facility: CLINIC | Age: 75
End: 2025-04-08
Attending: INTERNAL MEDICINE
Payer: MEDICARE

## 2025-04-08 VITALS — WEIGHT: 148 LBS | HEIGHT: 67 IN | BODY MASS INDEX: 23.23 KG/M2

## 2025-04-08 DIAGNOSIS — Z71.89 GOALS OF CARE, COUNSELING/DISCUSSION: ICD-10-CM

## 2025-04-08 DIAGNOSIS — Z71.89 ADVANCED CARE PLANNING/COUNSELING DISCUSSION: ICD-10-CM

## 2025-04-08 DIAGNOSIS — Z95.811 LVAD (LEFT VENTRICULAR ASSIST DEVICE) PRESENT (H): ICD-10-CM

## 2025-04-08 DIAGNOSIS — I48.91 ATRIAL FIBRILLATION, UNSPECIFIED TYPE (H): ICD-10-CM

## 2025-04-08 DIAGNOSIS — Z95.810 AICD (AUTOMATIC CARDIOVERTER/DEFIBRILLATOR) PRESENT: ICD-10-CM

## 2025-04-08 DIAGNOSIS — I50.22 CHRONIC SYSTOLIC CONGESTIVE HEART FAILURE (H): Primary | ICD-10-CM

## 2025-04-08 DIAGNOSIS — Z51.5 ENCOUNTER FOR PALLIATIVE CARE: ICD-10-CM

## 2025-04-08 PROCEDURE — 1126F AMNT PAIN NOTED NONE PRSNT: CPT | Mod: 95 | Performed by: STUDENT IN AN ORGANIZED HEALTH CARE EDUCATION/TRAINING PROGRAM

## 2025-04-08 PROCEDURE — 99497 ADVNCD CARE PLAN 30 MIN: CPT | Mod: 25 | Performed by: STUDENT IN AN ORGANIZED HEALTH CARE EDUCATION/TRAINING PROGRAM

## 2025-04-08 PROCEDURE — 98007 SYNCH AUDIO-VIDEO EST HI 40: CPT | Mod: 25 | Performed by: STUDENT IN AN ORGANIZED HEALTH CARE EDUCATION/TRAINING PROGRAM

## 2025-04-08 ASSESSMENT — PAIN SCALES - GENERAL: PAINLEVEL_OUTOF10: NO PAIN (0)

## 2025-04-08 NOTE — PROGRESS NOTES
"Palliative Care Progress Note    Patient Name: Duane C Johnson  Primary Provider: Jose Coles    Chief Complaint/Patient ID:   He has a PMHx of end-stage cardiomyopathy, history of coronary artery disease, previous STEMI, history of VT VF arrest, status post placement of ICD, history of A-fib a flutter with previous cardioversion, status post HeartMate 3 LVAD implantation 9/18/2024.     Last Palliative care appointment: Seen during inpatient admission at [facility] on 9/6/24 with Dr. Kumari for LVAD assessment.     Reviewed: Yes, only Rx's for Gabapentin.    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    Advanced Care Planning: Working on HCD. Wife is HCA.      Interim History:  Duane C Johnson is seen today for follow up with Palliative Care via billable video visit.     Referral was placed to have a check in on goals of care 1 year post LVAD placement, however, he was scheduled earlier (currently 7 months post procedure.      Says he was initially deemed not a transplant candidate. Says he got a letter a couple months ago regarding the levels of transplant candidacy. Wondering if this is changing.    Describes himself as \"very independent\".     LVAD seems to be working well- no big issues with it. Does note it's \"in the way\". 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 Notes stamina is low but about the same since LVAD placeent. Takes two naps a day. Can walk to his mailbox which is about 1000 feet but has to rest.     Wife thinks he doesn't doing anything. Was doing substantially more from a functionality standpoint prior to the surgery. At cardiac rehab, walks 1200 feet and does some other activities. Feels tired after sessions but he has been increasing his targets    Wakes up every 2 hours but falls back asleep OK. States he does feel refreshed in the morning when he gets up.    Quality of life is not as good as it was pre-LVAD but also not very poor. Has been forbidden from using " *NPV/ Dad okd appt for Aug 12th at 2 pm in [place] with dr Kely moyaw   "his - wondering about a laser  as no RF. He would also like to be able to drive again.     Can't do some things, but is finding other places for his attention and energy. 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 These sound orthostatic- only happened with position changes. Walks with a fast gait- hard to slow down. Trying to remember to go slower.     Heart transplantation and/or LVAD preparedness plan     Patient's legally designated health care agent: Melissa Aguila (spouse)     Patient's description of how they make decisions (by themselves, in consultation with certain close loved ones, based on advice from medical professionals, etc): In consultation with spouse Melissa     Patient's thoughts about what constitutes a 'good' quality of life/what makes life worth living: Duane very much enjoys being busy, both mentally busy with things that he finds to be challenging puzzles to solve as well as with hobbies that require fabrication/repair/restoration work on things like cars, dwellings.  Patient very much values his cognition and current mentation and state of mind, and he would not find a baseline mental status that is far from his current mental status to be acceptable. Duane loves learning new things and notes that he reads up on lots of different topics simply for the curiosity of wanting to know more about them.     Patient's personal goals/hopes for receiving the LVAD and/or transplant and how they anticipate future with LVAD and/or transplant to be like: Patient is hoping to be able to have more time to continue doing activities he enjoys.  He is hopeful that he is able to do what he calls \"light duty work\" such as using a riding mower or a tractor to do work on his property, and that he is able to continue doing work on cars and a motorcycle that he owns, and continues to " "be active in an online science group that he is a part of that he describes as working on [employer]-inspired science questions/puzzles.     Patient's worries/concerns when considering receiving the LVAD and/or transplant: Patient did not express specific worries for me today about receiving LVAD, but more had questions about what types of machinery and building activities he may need to stop doing.  These questions were forwarded to his cardiology team.     Patient's thoughts about what health conditions would be unacceptable (situations the patient would want her/his doctors and loved ones to know that she/he would not want life prolonged)? Duane noted that if he were in a \"vegetative state\", or a state where he were not wakeful and able to interact with loved ones, that this would not be an acceptable quality of life for him.  He would not find a life where he were confined to a bed and bed ridden continuously for the rest of his life to be an acceptable quality of life for him.  He is not sure if he would be okay with living for the rest of his life in a care facility, but would be okay with a care facility short-term.     Serious surgeries like LVAD and/or Heart transplantation carries a small risk of perioperative stroke/brain injury, which for some people, may limit their ability to communicate their wishes to others.  After a stroke, what sort of functional outcomes would be acceptable vs unacceptable to the patient?  For Duane, his current cognition and mental status is very important to him, he enjoys challenging his mind with puzzles and problems to solve.  He notes that if he were not near his current baseline cognition and mental status, that this would not be an unacceptable quality of life for him. He is not sure if he would be okay with living for the rest of his life in a care facility, but he would be okay with a TCU and/or care facility short-term.     The need to be on a ventilator/breathing machine " through a tracheostomy (a tube in your neck) is a risk. What are with circumstances you would want your medical providers and family to keep you alive with long term mechanical ventilation? Duane thinks he would be okay with tracheostomy and ventilation by tracheostomy for a short time, and discharge to LTACH, if it were a step toward clinical improvement and eventual ability to wean from ventilator and decannulation from tracheostomy.  He is not sure that ventilation by tracheostomy would be okay if it were for the rest of his life.     There are risks for kidney failure in patients with advancing heart disease who need LVAD support.  The places/locations that offer dialysis and accept patients with LVADs may be limited in your community.  What do you know about dialysis? Would you be open to doing dialysis and if so what quality of life concerns would you have? Duane really does not like the idea of having to go to a dialysis center 3 times a week to complete hemodialysis, and notes that he would be okay with indefinite hemodialysis if he were able to set this up at home with a private-pay home dialysis set up (we discussed that this may be unlikely).  Duane would be okay with short-term dialysis.     LVAD's are sometimes placed with the possibility of later pursuing heart transplant. Some of our patients are determined not to be heart transplant candidates, or choose to forego heart transplant surgery for personal reasons.  If you had an LVAD placed inside of you for the remainder of your life, would that be acceptable to you and what would be your concerns?  Duane did not express any concerns with living the remainder of his life with an LVAD in place.     What circumstances or events would lead you to decide or ask your health care agent to decide that you would want the LVAD turned off and be allowed to die a natural death?  Duane notes that he would want his LVAD turned off if he were in a vegetative state,  or in a state where he were permanently not wakeful and unable to interact with loved ones, or if he were bed ridden continuously for the remainder of his life.    Physical Exam:   Constitutional: Alert, pleasant, no apparent distress. Sitting up in chair.  Eyes: Sclera non-icteric, no eye discharge.  ENT: No nasal discharge. Ears grossly normal.  Respiratory: Unlabored respirations. Speaking in full sentences.  Musculoskeletal: Extremities appear normal- no gross deformities noted. No edema noted on upper body.   Skin: No suspicious lesions or rashes on visible skin.  Neurologic: Clear speech, no aphasia. No facial droop.  Psychiatric: Mentation appears normal, appropriate attention. Affect normal/bright. Does not appear anxious or depressed.    Key Data Reviewed:  LABS:   Lab Results   Component Value Date    WBC 5.0 03/28/2025    HGB 10.7 (L) 03/28/2025    HCT 32.1 (L) 03/28/2025     03/28/2025     03/28/2025    POTASSIUM 3.8 03/28/2025    CHLORIDE 98 03/28/2025    CO2 26 03/28/2025    BUN 10.5 03/28/2025    CR 0.97 03/28/2025     (H) 03/28/2025    DD 0.87 (H) 12/25/2023    NTBNPI 22,172 (H) 09/04/2024    NTBNP 1,764 (H) 03/28/2025    AST 26 03/28/2025    ALT 24 03/28/2025    ALKPHOS 126 03/28/2025    BILITOTAL 0.4 03/28/2025    INR 2.00 (H) 03/28/2025     3/28/25 - GFR 81, Albumin 3.9.      Impression & Recommendations & Counseling:  Duane C Johnson has a history of end-stage cardiomyopathy, history of coronary artery disease, previous STEMI, history of VT VF arrest, status post placement of ICD, history of A-fib a flutter with previous cardioversion, status post HeartMate 3 LVAD implantation 9/18/2024.    Advance Care Planning Discussion 4/8/2025. Crystal CURIEL DO met with Patient and their spouse today during a clinic visit to discuss Advance Care Planning. Duane C Johnson does have decisional capacity and was present for this discussion.  Those present were informed of the  "voluntary nature of this discussion and wished to proceed.     -Reviewed LVAD Preparedness plan questions today, and he reports no change. While his quality of life is not at the level it was previously, he says his measures of quality have shifted and he feels overall things are good. Hoping to continue getting stronger and building more stamina. He has no interest in turning off the LVAD at this point.    -We talked about how everybody has different criteria for what they consider acceptable in their lives, and this varies from person to person.  Encouraged him to think about what he considers important and if there are any situations he would deem not acceptable and to use this information to help inform how hemakes decisions about his health care, namely what sort of interventions or procedures he would want to go through.      -Encouraged patient and wife to review medical decisions through the lens of what is important to him and what brings meaning and value to his life, and then fit the medical \"stuff\" into that framework.     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 I also encouraged him to ask about medical clearance for driving in the future and to consider a driving assessment with Nuno Mtz at that time.      Follow up: As needed.      Video-Visit Details  Video Start Time: 2:24PM  Video End Time: 3:09PM    Originating Location (pt. Location): Home     Distant Location (provider location):  Offsite- Personal Home      Platform used for Video Visit: Ynes     Total time spent on day of encounter is 24 mins, including reviewing record, review of above studies, above visit with patient, 20 minutes spent exclusively on advanced care planning and goals of care discussion regarding prognosis with heart failure and the LVAD, other activities per note, and documentation.       The longitudinal plan of " care for the diagnosis(es)/condition(s) as documented were addressed during this visit.?Due to the added complexity in care, I will continue to support Duane C Johnson in the subsequent management and with the ongoing continuity of care.    Crystal Marcelo, DO  Palliative Medicine       Some chart documentation performed using Dragon Voice recognition Software. Although reviewed after completion, some words and grammatical errors may remain.

## 2025-04-08 NOTE — NURSING NOTE
Patient confirms medications and allergies are accurate via patients echeck in completion, and or denies any changes since last reviewed/verified.     Current patient location: [address] [place] [id number]    Is the patient currently in the state of [place]? YES    Visit mode: VIDEO    If the visit is dropped, the patient can be reconnected by:VIDEO VISIT: Text to cell phone:   Telephone Information:   Mobile [phone]       Will anyone else be joining the visit? NO Melissa - Wife  (If patient encounters technical issues they should call [phone])    Are changes needed to the allergy or medication list? No    Are refills needed on medications prescribed by this physician? NO    Rooming Documentation:  Questionnaire(s) not done per department protocol    Reason for visit: Consult    Jennifer Coughlin VVF

## 2025-04-08 NOTE — PATIENT INSTRUCTIONS
"Recommendations:  -We talked about how everybody has different criteria for what they consider acceptable in their lives, and this varies from person to person. Encouraged you to think about what you consider important and if there are any situations you would deem not acceptable and to use this information to help inform how you make decisions about your health care, namely what sort of interventions or procedures you would want to go through.  -Encouraged you to review medical decisions through the lens of what is important to you and what brings meaning and value to your life, and then fit the medical \"stuff\" into that framework.   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   -Also suggested asking about medical clearance for driving in the future and to consider a driving assessment with Nuno Mtz at that time.    Follow up: As needed      *Please do not make any changes to medications that we prescribe, including pain medicines, without talking to our team*    Reasons to Call    If you are having worsening/uncontrolled symptoms we want you to call!    You or your other physicians make any changes to medications we have prescribed.  -Please call for refills 4-5 days before you will run out of medication.    Important Phone Numbers, including: Refills, scheduling, and general questions     Palliative Care RN: Pallavi Villegas : [phone]  *For scheduling needs/follow up visits : [phone]  *After hours or on weekends- Will connect you with on call MD : [phone].    "

## 2025-04-08 NOTE — LETTER
"4/8/2025       RE: Duane C Johnson  [id number] [facility] [address]     Dear Colleague,    Thank you for referring your patient, Duane C Johnson, [facility]. Please see a copy of my visit note below.    Palliative Care Progress Note    Patient Name: Duane C Johnson  Primary Provider: Jose Coles    Chief Complaint/Patient ID:   He has a PMHx of end-stage cardiomyopathy, history of coronary artery disease, previous STEMI, history of VT VF arrest, status post placement of ICD, history of A-fib a flutter with previous cardioversion, status post HeartMate 3 LVAD implantation 9/18/2024.     Last Palliative care appointment: Seen during inpatient admission at [facility] on 9/6/24 with Dr. Kumari for LVAD assessment.     Reviewed: Yes, only Rx's for Gabapentin.    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    Advanced Care Planning: Working on HCD. Wife is [employer].      Interim History:  Duane C Johnson is seen today for follow up with Palliative Care via billable video visit.     Referral was placed to have a check in on goals of care 1 year post LVAD placement, however, he was scheduled earlier (currently 7 months post procedure.      Says he was initially deemed not a transplant candidate. Says he got a letter a couple months ago regarding the levels of transplant candidacy. Wondering if this is changing.    Describes himself as \"very independent\".     LVAD seems to be working well- no big issues with it. Does note it's \"in the way\". 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 Notes stamina is low but about the same since LVAD placeent. Takes two naps a day. Can walk to his mailbox which is about 1000 feet but has to rest.     Wife thinks he doesn't doing anything. Was doing substantially more from a functionality standpoint prior to the surgery. At cardiac rehab, walks 1200 feet and does some " "other activities. Feels tired after sessions but he has been increasing his targets    Wakes up every 2 hours but falls back asleep OK. States he does feel refreshed in the morning when he gets up.    Quality of life is not as good as it was pre-LVAD but also not very poor. Has been forbidden from using his - wondering about a laser  as no RF. He would also like to be able to drive again.     Can't do some things, but is finding other places for his attention and energy. He's a member of the [place] [employer]- \"I have interesting projects\"    Says he occasionally gets lightheaded and passes out, but it has been a while. These sound orthostatic- only happened with position changes. Walks with a fast gait- hard to slow down. Trying to remember to go slower.     Heart transplantation and/or LVAD preparedness plan     Patient's legally designated health care agent: Melissa Aguila (spouse)     Patient's description of how they make decisions (by themselves, in consultation with certain close loved ones, based on advice from medical professionals, etc): In consultation with spouse Melissa     Patient's thoughts about what constitutes a 'good' quality of life/what makes life worth living: Duane very much enjoys being busy, both mentally busy with things that he finds to be challenging puzzles to solve as well as with hobbies that require fabrication/repair/restoration work on things like cars, dwellings.  Patient very much values his cognition and current mentation and state of mind, and he would not find a baseline mental status that is far from his current mental status to be acceptable. Duane loves learning new things and notes that he reads up on lots of different topics simply for the curiosity of wanting to know more about them.     Patient's personal goals/hopes for receiving the LVAD and/or transplant and how they anticipate future with LVAD and/or transplant to be like: Patient is hoping to " "be able to have more time to continue doing activities he enjoys.  He is hopeful that he is able to do what he calls \"light duty work\" such as using a riding mower or a tractor to do work on his property, and that he is able to continue doing work on cars and a motorcycle that he owns, and continues to be active in an online science group that he is a part of that he describes as working on [employer]-inspired science questions/puzzles.     Patient's worries/concerns when considering receiving the LVAD and/or transplant: Patient did not express specific worries for me today about receiving LVAD, but more had questions about what types of machinery and building activities he may need to stop doing.  These questions were forwarded to his cardiology team.     Patient's thoughts about what health conditions would be unacceptable (situations the patient would want her/his doctors and loved ones to know that she/he would not want life prolonged)? Duane noted that if he were in a \"vegetative state\", or a state where he were not wakeful and able to interact with loved ones, that this would not be an acceptable quality of life for him.  He would not find a life where he were confined to a bed and bed ridden continuously for the rest of his life to be an acceptable quality of life for him.  He is not sure if he would be okay with living for the rest of his life in a care facility, but would be okay with a care facility short-term.     Serious surgeries like LVAD and/or Heart transplantation carries a small risk of perioperative stroke/brain injury, which for some people, may limit their ability to communicate their wishes to others.  After a stroke, what sort of functional outcomes would be acceptable vs unacceptable to the patient?  For Duane, his current cognition and mental status is very important to him, he enjoys challenging his mind with puzzles and problems to solve.  He notes that if he were not near his current baseline " cognition and mental status, that this would not be an unacceptable quality of life for him. He is not sure if he would be okay with living for the rest of his life in a care facility, but he would be okay with a TCU and/or care facility short-term.     The need to be on a ventilator/breathing machine through a tracheostomy (a tube in your neck) is a risk. What are with circumstances you would want your medical providers and family to keep you alive with long term mechanical ventilation? Duane thinks he would be okay with tracheostomy and ventilation by tracheostomy for a short time, and discharge to LTACH, if it were a step toward clinical improvement and eventual ability to wean from ventilator and decannulation from tracheostomy.  He is not sure that ventilation by tracheostomy would be okay if it were for the rest of his life.     There are risks for kidney failure in patients with advancing heart disease who need LVAD support.  The places/locations that offer dialysis and accept patients with LVADs may be limited in your community.  What do you know about dialysis? Would you be open to doing dialysis and if so what quality of life concerns would you have? Duane really does not like the idea of having to go to a dialysis center 3 times a week to complete hemodialysis, and notes that he would be okay with indefinite hemodialysis if he were able to set this up at home with a private-pay home dialysis set up (we discussed that this may be unlikely).  Duane would be okay with short-term dialysis.     LVAD's are sometimes placed with the possibility of later pursuing heart transplant. Some of our patients are determined not to be heart transplant candidates, or choose to forego heart transplant surgery for personal reasons.  If you had an LVAD placed inside of you for the remainder of your life, would that be acceptable to you and what would be your concerns?  Duane did not express any concerns with living the  remainder of his life with an LVAD in place.     What circumstances or events would lead you to decide or ask your health care agent to decide that you would want the LVAD turned off and be allowed to die a natural death?  Duane notes that he would want his LVAD turned off if he were in a vegetative state, or in a state where he were permanently not wakeful and unable to interact with loved ones, or if he were bed ridden continuously for the remainder of his life.    Physical Exam:   Constitutional: Alert, pleasant, no apparent distress. Sitting up in chair.  Eyes: Sclera non-icteric, no eye discharge.  ENT: No nasal discharge. Ears grossly normal.  Respiratory: Unlabored respirations. Speaking in full sentences.  Musculoskeletal: Extremities appear normal- no gross deformities noted. No edema noted on upper body.   Skin: No suspicious lesions or rashes on visible skin.  Neurologic: Clear speech, no aphasia. No facial droop.  Psychiatric: Mentation appears normal, appropriate attention. Affect normal/bright. Does not appear anxious or depressed.    Key Data Reviewed:  LABS:   Lab Results   Component Value Date    WBC 5.0 03/28/2025    HGB 10.7 (L) 03/28/2025    HCT 32.1 (L) 03/28/2025     03/28/2025     03/28/2025    POTASSIUM 3.8 03/28/2025    CHLORIDE 98 03/28/2025    CO2 26 03/28/2025    BUN 10.5 03/28/2025    CR 0.97 03/28/2025     (H) 03/28/2025    DD 0.87 (H) 12/25/2023    NTBNPI 22,172 (H) 09/04/2024    NTBNP 1,764 (H) 03/28/2025    AST 26 03/28/2025    ALT 24 03/28/2025    ALKPHOS 126 03/28/2025    BILITOTAL 0.4 03/28/2025    INR 2.00 (H) 03/28/2025     3/28/25 - GFR 81, Albumin 3.9.      Impression & Recommendations & Counseling:  Duane C Johnson has a history of end-stage cardiomyopathy, history of coronary artery disease, previous STEMI, history of VT VF arrest, status post placement of ICD, history of A-fib a flutter with previous cardioversion, status post HeartMate 3 LVAD  "implantation 9/18/2024.    Advance Care Planning Discussion 4/8/2025. I, Crystal Marcelo, DO met with Patient and their spouse today during a clinic visit to discuss Advance Care Planning. Duane C Johnson does have decisional capacity and was present for this discussion.  Those present were informed of the voluntary nature of this discussion and wished to proceed.     -Reviewed LVAD Preparedness plan questions today, and he reports no change. While his quality of life is not at the level it was previously, he says his measures of quality have shifted and he feels overall things are good. Hoping to continue getting stronger and building more stamina. He has no interest in turning off the LVAD at this point.    -We talked about how everybody has different criteria for what they consider acceptable in their lives, and this varies from person to person.  Encouraged him to think about what he considers important and if there are any situations he would deem not acceptable and to use this information to help inform how hemakes decisions about his health care, namely what sort of interventions or procedures he would want to go through.      -Encouraged patient and wife to review medical decisions through the lens of what is important to him and what brings meaning and value to his life, and then fit the medical \"stuff\" into that framework.     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 I also encouraged him to ask about medical clearance for driving in the future and to consider a driving assessment with Nuno Mtz at that time.      Follow up: As needed.      Video-Visit Details  Video Start Time: 2:24PM  Video End Time: 3:09PM    Originating Location (pt. Location): Home     Distant Location (provider location):  Offsite- Personal Home      Platform used for Video Visit: Ynes     Total time spent on day " of encounter is 24 mins, including reviewing record, review of above studies, above visit with patient, 20 minutes spent exclusively on advanced care planning and goals of care discussion regarding prognosis with heart failure and the LVAD, other activities per note, and documentation.       The longitudinal plan of care for the diagnosis(es)/condition(s) as documented were addressed during this visit.?Due to the added complexity in care, I will continue to support Duane C Johnson in the subsequent management and with the ongoing continuity of care.    Crystal Marcelo,   Palliative Medicine       Some chart documentation performed using Dragon Voice recognition Software. Although reviewed after completion, some words and grammatical errors may remain.      Again, thank you for allowing me to participate in the care of your patient.      Sincerely,    Crystal Marcelo, DO

## 2025-04-09 ENCOUNTER — LAB (OUTPATIENT)
Dept: LAB | Facility: CLINIC | Age: 75
End: 2025-04-09
Payer: MEDICARE

## 2025-04-09 ENCOUNTER — HOSPITAL ENCOUNTER (OUTPATIENT)
Dept: CARDIAC REHAB | Facility: CLINIC | Age: 75
Discharge: HOME | End: 2025-04-09
Attending: SURGERY
Payer: MEDICARE

## 2025-04-09 ENCOUNTER — ANTICOAGULATION THERAPY VISIT (OUTPATIENT)
Dept: ANTICOAGULATION | Facility: CLINIC | Age: 75
End: 2025-04-09

## 2025-04-09 ENCOUNTER — CARE COORDINATION (OUTPATIENT)
Dept: CARDIOLOGY | Facility: CLINIC | Age: 75
End: 2025-04-09

## 2025-04-09 VITALS — BODY MASS INDEX: 25.64 KG/M2 | WEIGHT: 163.7 LBS

## 2025-04-09 DIAGNOSIS — Z95.811 LVAD (LEFT VENTRICULAR ASSIST DEVICE) PRESENT (H): ICD-10-CM

## 2025-04-09 DIAGNOSIS — I95.89 OTHER SPECIFIED HYPOTENSION: ICD-10-CM

## 2025-04-09 DIAGNOSIS — I50.42 CHRONIC COMBINED SYSTOLIC AND DIASTOLIC HEART FAILURE (H): ICD-10-CM

## 2025-04-09 DIAGNOSIS — Z79.01 ANTICOAGULATED ON WARFARIN: ICD-10-CM

## 2025-04-09 DIAGNOSIS — I50.22 CHRONIC SYSTOLIC CONGESTIVE HEART FAILURE (H): ICD-10-CM

## 2025-04-09 DIAGNOSIS — I50.22 CHRONIC SYSTOLIC CONGESTIVE HEART FAILURE (H): Primary | ICD-10-CM

## 2025-04-09 LAB
INR BLD: 3 (ref 0.9–1.1)
INR HOME MONITORING: 3 RATIO (ref 2–3)

## 2025-04-09 PROCEDURE — 93798 PHYS/QHP OP CAR RHAB W/ECG: CPT | Mod: KX

## 2025-04-09 NOTE — PROGRESS NOTES
Situation:   Writer spoke with  Lucas, cardiac rehab  today to discuss weight gain.     Background:  Weight today:   Vitals:    04/07/25 1515 04/09/25 1500   Weight: 71.8 kg (158 lb 3.2 oz) 74.3 kg (163 lb 11.2 oz)      Compared to recent values this weight is increased by 5lbs in 2 days    Assessment:  NIBP/MAP: 85, same when obtained on Monday  Symptoms:   Heart failure symptoms: fatigue. No swelling in extremities, hasn't started his session however reports the 300 ft walk in to the clinic was difficult for him. Doesn't appear short of breath.    Duane reported having a saltier dinner the last few nights, which is encouraged by cardiologist due to chronic hypovolemia. Patient has no PRN diuretics.     Recommendation:  Will discuss with Dr. Cruz.  Requested that Lucas or Patient notify on-call coordinator if symptoms worsen or with other concerns.  Lucas  verbalized understanding.

## 2025-04-10 NOTE — PROGRESS NOTES
ANTICOAGULATION MANAGEMENT     Duane C Johnson 75 year old male is on warfarin with therapeutic INR result. (Goal INR 2.0-3.0)    Recent labs: (last 7 days)     04/09/25 2022   INR 3       ASSESSMENT     Source(s): Chart Review and Patient/Caregiver Call     Warfarin doses taken: Warfarin taken as instructed  Diet: No new diet changes identified  Medication/supplement changes: None noted  New illness, injury, or hospitalization: Yes: 5 lb weight gain-decrease weight by 3 lbs per spouse.   Signs or symptoms of bleeding or clotting: No  Previous result: Therapeutic last 2(+) visits  Additional findings: None       PLAN     Recommended plan for temporary change(s) affecting INR     Dosing Instructions: Continue your current warfarin dose with next INR in 1 week       Summary  As of 4/9/2025      Full warfarin instructions:  1.25 mg every Sun, Tue, Thu; 2.5 mg all other days   Next INR check:  4/16/2025               Telephone call with Melissa who agrees to plan and repeated back plan correctly    Patient to recheck with home meter    Education provided: Goal range and lab monitoring: goal range and significance of current result and Importance of following up at instructed interval  Contact [phone] with any changes, questions or concerns.     Plan made per [facility] anticoagulation protocol and per LVAD protocol    CANDY LOYOLA RN  4/10/2025  Anticoagulation Clinic  [employer] for routing messages: susannah MARTINEZ LVAD  [facility] patient phone line: [phone]        _______________________________________________________________________     Anticoagulation Episode Summary       Current INR goal:  2.0-3.0   TTR:  65.0% (4.7 mo)   Target end date:  Indefinite   Send INR reminders to:  JUAN LVAD    Indications    Chronic systolic congestive heart failure (H) [I50.22]  Anticoagulated on warfarin [Z79.01]  LVAD (left ventricular assist device) present (H) [Z95.811]  Other specified hypotension [I95.89]  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

## 2025-04-10 NOTE — PROGRESS NOTES
Dr. Cruz requests that no intervention is made and reassess at a later date due to his history of hypovolemia. Will notify primary VAD coordinator.

## 2025-04-14 ENCOUNTER — HOSPITAL ENCOUNTER (OUTPATIENT)
Dept: CARDIAC REHAB | Facility: CLINIC | Age: 75
Discharge: HOME | End: 2025-04-14
Attending: SURGERY
Payer: MEDICARE

## 2025-04-14 PROCEDURE — 93798 PHYS/QHP OP CAR RHAB W/ECG: CPT | Mod: KX

## 2025-04-15 ENCOUNTER — TELEPHONE (OUTPATIENT)
Dept: CARDIOLOGY | Facility: CLINIC | Age: 75
End: 2025-04-15
Payer: MEDICARE

## 2025-04-15 ENCOUNTER — CARE COORDINATION (OUTPATIENT)
Dept: CARDIOLOGY | Facility: CLINIC | Age: 75
End: 2025-04-15
Payer: MEDICARE

## 2025-04-15 DIAGNOSIS — I50.22 CHRONIC SYSTOLIC CONGESTIVE HEART FAILURE (H): Primary | ICD-10-CM

## 2025-04-15 DIAGNOSIS — Z95.811 LVAD (LEFT VENTRICULAR ASSIST DEVICE) PRESENT (H): ICD-10-CM

## 2025-04-15 NOTE — TELEPHONE ENCOUNTER
Patient Contacted for the patient to call back and schedule the following:    Appointment type: Return EP  Provider: Alexa Aleman  Return date: 09/08  Specialty phone number: [phone] opt 1  Additional appointment(s) needed: NA  Additonal Notes: NA

## 2025-04-15 NOTE — PROGRESS NOTES
"Called patient/caregiver to check in 24 weeks post discharge from VAD implant hospitalization.        These parameters are at baseline.   Pt states current weight is 158 lbs  which is at baseline.   Pt is checking MAP's at home. Most recent MAP is 78  Reviewed medications and answered any questions.   Patient reports sleeping well and has no anxiety since being home with their LVAD.   Patient is able to move around the house and care for himself independently.     Discussed specific new problems/stressors since being discharged from the hospital: \"just tired\"     Empathized with patient and reviewed coping strategies: enlisting support from friends and love ones, attending patient and caregiver support groups, reviewing LVAD educational materials to reinforce knowledge, and talking about concerns with family/care providers/trusted others. Encouraged pt to page VAD Coordinator with any issues or questions. Pt verbalizes understanding.   "

## 2025-04-16 ENCOUNTER — HOSPITAL ENCOUNTER (OUTPATIENT)
Dept: CARDIAC REHAB | Facility: CLINIC | Age: 75
Discharge: HOME | End: 2025-04-16
Attending: SURGERY
Payer: MEDICARE

## 2025-04-16 PROCEDURE — 93798 PHYS/QHP OP CAR RHAB W/ECG: CPT | Mod: KX

## 2025-04-18 ENCOUNTER — HOSPITAL ENCOUNTER (OUTPATIENT)
Dept: CARDIAC REHAB | Facility: CLINIC | Age: 75
Discharge: HOME | End: 2025-04-18
Attending: FAMILY MEDICINE
Payer: MEDICARE

## 2025-04-18 PROCEDURE — 93798 PHYS/QHP OP CAR RHAB W/ECG: CPT | Mod: KX

## 2025-04-21 ENCOUNTER — HOSPITAL ENCOUNTER (OUTPATIENT)
Dept: CARDIAC REHAB | Facility: CLINIC | Age: 75
Discharge: HOME | End: 2025-04-21
Attending: FAMILY MEDICINE
Payer: MEDICARE

## 2025-04-21 PROCEDURE — 93798 PHYS/QHP OP CAR RHAB W/ECG: CPT | Mod: KX

## 2025-04-23 ENCOUNTER — TELEPHONE (OUTPATIENT)
Dept: ANTICOAGULATION | Facility: CLINIC | Age: 75
End: 2025-04-23
Payer: MEDICARE

## 2025-04-23 NOTE — TELEPHONE ENCOUNTER
ANTICOAGULATION     Duane C Johnson is overdue for an INR check.     Spoke with Melissa and scheduled lab appointment on 4/30/25    Namrata Walker, RN  4/23/2025  Anticoagulation Clinic  [facility] for routing messages: susannah MARTINEZ LVAD  [facility] patient phone line: [phone]

## 2025-04-30 ENCOUNTER — OFFICE VISIT (OUTPATIENT)
Dept: SURGERY | Facility: CLINIC | Age: 75
End: 2025-04-30
Attending: INTERNAL MEDICINE
Payer: MEDICARE

## 2025-04-30 ENCOUNTER — PRE VISIT (OUTPATIENT)
Dept: SURGERY | Facility: CLINIC | Age: 75
End: 2025-04-30

## 2025-04-30 ENCOUNTER — ANTICOAGULATION THERAPY VISIT (OUTPATIENT)
Dept: ANTICOAGULATION | Facility: CLINIC | Age: 75
End: 2025-04-30

## 2025-04-30 ENCOUNTER — LAB (OUTPATIENT)
Dept: LAB | Facility: CLINIC | Age: 75
End: 2025-04-30
Payer: MEDICARE

## 2025-04-30 VITALS — HEIGHT: 66 IN | WEIGHT: 170 LBS | BODY MASS INDEX: 27.32 KG/M2 | HEART RATE: 65 BPM | OXYGEN SATURATION: 96 %

## 2025-04-30 DIAGNOSIS — K62.5 RECTAL BLEEDING: Primary | ICD-10-CM

## 2025-04-30 DIAGNOSIS — Z95.811 LVAD (LEFT VENTRICULAR ASSIST DEVICE) PRESENT (H): ICD-10-CM

## 2025-04-30 DIAGNOSIS — I50.22 CHRONIC SYSTOLIC CONGESTIVE HEART FAILURE (H): Primary | ICD-10-CM

## 2025-04-30 DIAGNOSIS — I50.22 CHRONIC SYSTOLIC CONGESTIVE HEART FAILURE (H): ICD-10-CM

## 2025-04-30 DIAGNOSIS — I50.42 CHRONIC COMBINED SYSTOLIC AND DIASTOLIC HEART FAILURE (H): ICD-10-CM

## 2025-04-30 DIAGNOSIS — Z79.01 ANTICOAGULATED ON WARFARIN: ICD-10-CM

## 2025-04-30 DIAGNOSIS — K62.7 RADIATION PROCTITIS: ICD-10-CM

## 2025-04-30 DIAGNOSIS — I95.89 OTHER SPECIFIED HYPOTENSION: ICD-10-CM

## 2025-04-30 LAB
INR BLD: 2.6 (ref 0.9–1.1)
INR HOME MONITORING: 2.6 RATIO (ref 2–3)

## 2025-04-30 PROCEDURE — 85610 PROTHROMBIN TIME: CPT | Performed by: PATHOLOGY

## 2025-04-30 PROCEDURE — 36415 COLL VENOUS BLD VENIPUNCTURE: CPT | Performed by: PATHOLOGY

## 2025-04-30 ASSESSMENT — PAIN SCALES - GENERAL: PAINLEVEL_OUTOF10: NO PAIN (0)

## 2025-04-30 NOTE — LETTER
"4/30/2025       RE: Duane C Johnson  [address] [address] [place] [id number]     Dear Colleague,    Thank you for referring your patient, Duane C Johnson, to the [facility] COLON AND RECTAL SURGERY CLINIC [place] at [facility]. Please see a copy of my visit note below.    Colon and Rectal Surgery Consult Clinic Note    Referring provider:  Ham Cruz MD  [address] [place] [id number]     Patient: Duane C Johnson  YOB: 1950  Date of Visit: 4/30/2025    Duane C Johnson is a very pleasant 75 year old male with history of prostate cancer s/p radiation 6/2024 and PMH significant for CAD, systolic heart failure, STEMI, and ventricular fibrillation with LVAD who presents today for rectal bleeding.    Flexible sigmoidoscopy 2/5/25:  Findings:       Skin tags were found on perianal exam.        A moderate amount of stool was found in the entire colon, interfering        with visualization. Large amount of solid stool in the proximal        descending colon which was the furthest extent of the exam        Many large-mouthed diverticula were found in the sigmoid colon.        A localized area of moderately altered vascular, congested and        erythematous mucosa was found in the rectum.     He did Carafate enemas and bleeding has significantly improved.  He only has a mild amount of bright red blood when he has hard bowel movements about once a week.  No pain with bowel movements.  No prolapsing tissue.  He is not on any bowel medications.    Physical Examination:  Pulse 65   Ht 5' 6\"   Wt 170 lb   SpO2 96%   BMI 27.44 kg/m    General: alert, oriented, in no acute distress, sitting comfortably  Chaperone: Allie Cobos, EMT   Perianal External Examination:  Perianal skin: Intact with no excoriation or lichenification.  Lesions: No evidence of an external lesion, nodularity, or induration in the perianal region.  Eversion of buttocks: " There was not evidence of an anal fissure. Details: N/A.  Skin tags or external hemorrhoids: None.    Digital Rectal Examination: Was performed.   Sphincter tone: Good.  Palpable lesions: No.  Other: None.  Bimanual examination: was not performed.    Anoscopy: Was performed.   Hemorrhoids: No significant internal hemorrhoids.  Lesions: Telangiectasia in the distal rectum visible without active bleeding.    Assessment/Plan: Duane C Johnson is a 75 year old male with complex cardiac history with LVAD and radiation proctitis and intermittent rectal bleeding.  Symptoms have significantly improved after Carafate enemas.  However, he continues to have a small amount of bright red blood once a week with hard bowel movements.  No significant hemorrhoids on anoscopy but some evidence of radiation proctitis without bleeding on exam today.  I would like him to try a fiber supplement daily to improve his stool consistency to see if this stops the bleeding.  If it does not, could consider formalin application in clinic.  Will have him return to clinic after 6 weeks if bleeding persists. Patient's questions were answered to his stated satisfaction and he is in agreement with this plan.       Past Medical History:   Diagnosis Date     Benign essential hypertension      CAD (coronary artery disease)      Chronic systolic heart failure (H)      Hypertension      ST elevation myocardial infarction (STEMI), unspecified artery (H)      Ventricular fibrillation (H)      Past Surgical History:   Procedure Laterality Date     ANESTHESIA CARDIOVERSION N/A 9/5/2024    Procedure: Anesthesia cardioversion;  Surgeon: GENERIC ANESTHESIA PROVIDER;  Location: [facility]     ANESTHESIA CARDIOVERSION N/A 9/30/2024    Procedure: Anesthesia cardioversion;  Surgeon: GENERIC ANESTHESIA PROVIDER;  Location: [facility]     COLONOSCOPY N/A 3/23/2023    Procedure: COLONOSCOPY, FLEXIBLE, WITH LESION REMOVAL USING SNARE;  Surgeon: Jose Faustin MD;   Location: [facility]     CV CENTRAL VENOUS CATHETER PLACEMENT N/A 10/26/2023    Procedure: Central Venous Catheter Placement;  Surgeon: Rob Lyles MD;  Location: [facility] CARDIAC CATH LAB     CV CORONARY ANGIOGRAM N/A 10/27/2023    Procedure: Coronary Angiogram;  Surgeon: Rob Lyles MD;  Location: [facility] CARDIAC CATH LAB     CV CORONARY ANGIOGRAM N/A 10/26/2023    Procedure: Coronary Angiogram;  Surgeon: Rob Lyles MD;  Location: [facility] CARDIAC CATH LAB     CV LEFT HEART CATH N/A 10/26/2023    Procedure: Left Heart Catheterization;  Surgeon: Rob Lyles MD;  Location: [facility] CARDIAC CATH LAB     CV PCI N/A 10/27/2023    Procedure: Percutaneous Coronary Intervention;  Surgeon: Rob Lyles MD;  Location: [facility] CARDIAC CATH LAB     CV PCI STENT DRUG ELUTING N/A 10/26/2023    Procedure: Percutaneous Coronary Intervention Stent;  Surgeon: Rob Lyles MD;  Location: [facility] CARDIAC CATH LAB     CV RIGHT HEART CATH MEASUREMENTS RECORDED N/A 5/6/2024    Procedure: Heart Cath Right Heart Cath;  Surgeon: Rob Lyles MD;  Location: [facility] CARDIAC CATH LAB     CV RIGHT HEART CATH MEASUREMENTS RECORDED N/A 9/3/2024    Procedure: Right Heart Catheterization;  Surgeon: Aaron Mesa MD;  Location: [facility] CARDIAC CATH LAB     CV RIGHT HEART CATH MEASUREMENTS RECORDED N/A 10/24/2024    Procedure: Right Heart Catheterization;  Surgeon: Haile Braden MD;  Location: [facility] CARDIAC CATH LAB     CV RIGHT HEART CATH MEASUREMENTS RECORDED N/A 3/18/2025    Procedure: Right Heart Catheterization with VAD Speed Optimization (no echo);  Surgeon: Aaron Mesa MD;  Location: [facility] CARDIAC CATH LAB     EP ICD INSERT SINGLE N/A 5/8/2024    Procedure: Implantable Cardioverter Defibrillator Device & Lead Implant Dual;  Surgeon: Guero Black MD;  Location: [facility] CARDIAC CATH LAB     INJECTION, HYDROGEL SPACER N/A  4/22/2024    Procedure: INJECTION, HYDROGEL SPACER AND FIDUCIAL MARKER PLACEMENT;  Surgeon: Stanislaw Barros MD;  [facility]     INSERT VENTRICULAR ASSIST DEVICE LEFT (HEARTMATE II) N/A 9/18/2024    Procedure: Median Sternotomy, INSERTION of LEFT VENTRICULAR ASSIST DEVICE (HEARTMATE III), cardiopulmonary bypass, transesophageal echocardiogram performed by anesthesia.;  Surgeon: Mulvihill, Michael, MD;  Location: [facility] OR     IRRIGATION AND DEBRIDEMENT CHEST WASHOUT, COMBINED Right 9/18/2024    Procedure: Exploration of chest, chest washout;  Surgeon: Mulvihill, Michael, MD;  Location: [facility]     SIGMOIDOSCOPY FLEXIBLE N/A 2/5/2025    Procedure: Sigmoidoscopy flexible;  Surgeon: Jp Cartwright MD;  Location: [facility] GI     Current Outpatient Medications   Medication Sig Dispense Refill     acetaminophen (TYLENOL) 325 MG tablet Take 2 tablets (650 mg) by mouth every 4 hours as needed for other (For optimal non-opioid multimodal pain management to improve pain control.).       amiodarone (PACERONE) 200 MG tablet Take 1 tablet (200 mg) by mouth daily. 90 tablet 3     amLODIPine (NORVASC) 10 MG tablet Take 0.5 tablets (5 mg) by mouth daily. 45 tablet 3     atorvastatin (LIPITOR) 80 MG tablet Take 1 tablet (80 mg) by mouth every evening. 90 tablet 3     digoxin (LANOXIN) 62.5 MCG tablet Take 1 tablet (62.5 mcg) by mouth daily. 90 tablet 0     escitalopram (LEXAPRO) 10 MG tablet Take 1 tablet (10 mg) by mouth or Feeding Tube daily. 90 tablet 3     gabapentin (NEURONTIN) 100 MG capsule Take 1 capsule (100 mg) by mouth or Feeding Tube at bedtime. 90 capsule 3     hydrALAZINE (APRESOLINE) 25 MG tablet Take 1 tablet (25 mg) by mouth 2 times daily. 180 tablet 3     magnesium hydroxide (MILK OF MAGNESIA) 400 MG/5ML suspension Take 30 mLs by mouth daily as needed for constipation (Use if polyethylene glycol (Miralax) is not effective after 24 hours.). 354 mL 0     magnesium oxide (MAG-OX) 400 MG tablet Take 1 tablet (400  mg) by mouth daily. 90 tablet 3     melatonin 10 MG TABS tablet Take 1 tablet (10 mg) by mouth every evening.       multivitamin w/minerals (THERA-VIT-M) tablet Take 1 tablet by mouth daily. 90 tablet 3     warfarin ANTICOAGULANT (COUMADIN) 2.5 MG tablet Take 0.5 tablets (1.25 mg) by mouth daily.       Allergies   Allergen Reactions     Brilinta [Ticagrelor] Other (See Comments) and Difficulty breathing     Per pt and spouse, hyperventilation.     Clonazepam Other (See Comments)     Per spouse, acted like a zombie and he was shaky, could barely talk.     Family History   Problem Relation Age of Onset     Other Cancer Mother      Obesity Mother      GI problems Father      Uterine Cancer Sister      Social History     Tobacco Use     Smoking status: Former     Current packs/day: 0.00     Average packs/day: 0.3 packs/day for 30.0 years (7.5 ttl pk-yrs)     Types: Cigarettes     Start date: 10/26/1993     Quit date: 10/26/2023     Years since quittin.5     Passive exposure: Past     Smokeless tobacco: Never     Tobacco comments:     Quit 10/26/2023   Substance Use Topics     Alcohol use: Yes     Comment: 1-2 beers per day    Marital status: .    NADEGE Mauro CNP  Colon and Rectal Surgery   [facility]      No LOS data to display  Time spent on date of encounter doing chart review, history and exam, documentation, and further activities in this note. An additional 2 minutes was spent for anoscopy.      Again, thank you for allowing me to participate in the care of your patient.      Sincerely,    NADEGE Mauro CNP

## 2025-04-30 NOTE — PROGRESS NOTES
ANTICOAGULATION MANAGEMENT     Duane C Johnson 75 year old male is on warfarin with subtherapeutic INR result. (Goal INR 2.0-3.0)    Recent labs: (last 7 days)     04/30/25  1536   INR 2.6*       ASSESSMENT     Source(s): Chart Review and Patient/Caregiver Call     Warfarin doses taken: Warfarin taken as instructed  Diet: No new diet changes identified  Medication/supplement changes: Provider is recommending a fiber supplement today  New illness, injury, or hospitalization: Yes: Patient saw GI today.  Patient is apparently only having rectal bleeding after a hard BM about once a week  Signs or symptoms of bleeding or clotting: No  Previous result: Therapeutic last 2(+) visits  Additional findings: None       PLAN     Recommended plan for no diet, medication or health factor changes affecting INR     Dosing Instructions: Continue your current warfarin dose with next INR in 2 weeks       Summary  As of 4/30/2025      Full warfarin instructions:  1.25 mg every Sun, Tue, Thu; 2.5 mg all other days   Next INR check:  5/14/2025               Telephone call with Melissa who verbalizes understanding and agrees to plan and who agrees to plan and repeated back plan correctly    Patient offered & declined to schedule next visit    Education provided: Taking warfarin: Importance of taking warfarin as instructed  Goal range and lab monitoring: goal range and significance of current result, Importance of therapeutic range, and Importance of following up at instructed interval    Plan made per [facility] anticoagulation protocol and per LVAD protocol    Namrata Walker RN  4/30/2025  Anticoagulation Clinic  [facility] for routing messages: susannah ANTICOAG LVAD  [facility] patient phone line: [phone]        _______________________________________________________________________     Anticoagulation Episode Summary       Current INR goal:  2.0-3.0   TTR:  69.5% (5.4 mo)   Target end date:  Indefinite   Send INR reminders to:  JUAN LVAD     Indications    Chronic systolic congestive heart failure (H) [I50.22]  Anticoagulated on warfarin [Z79.01]  LVAD (left ventricular assist device) present (H) [Z95.811]  Other specified hypotension [I95.89]  Chronic combined systolic and diastolic heart failure (H) [I50.42]             Comments:  Follow VAD Anticoag protocol:Yes: HeartMate 3  Bridging: Enoxaparin  Date VAD placed: 9/18/24             Anticoagulation Care Providers       Provider Role Specialty Phone number    Ham Cruz MD Referring Advanced Heart Failure and Transplant Cardiology [phone]    Sravanthi Asencio MD Referring Cardiovascular Disease [phone]

## 2025-04-30 NOTE — PATIENT INSTRUCTIONS
Start a daily fiber supplement such as Citrucel or Metamucil POWDER. Start with 2 tablespoons daily and slowly increase up to three times a day, if needed, over the next 4-6 weeks. If you are taking the fiber supplement three times a day that means you are taking 2 tablespoons three times a day (total 6 tablespoons daily).   Follow up after 6 weeks if bleeding persists

## 2025-04-30 NOTE — PROGRESS NOTES
"Colon and Rectal Surgery Consult Clinic Note    Referring provider:  Ham Cruz MD  [address] [place] [id number]     Patient: Duane C Johnson  YOB: 1950  Date of Visit: 4/30/2025    Duane C Johnson is a very pleasant 75 year old male with history of prostate cancer s/p radiation 6/2024 and PMH significant for CAD, systolic heart failure, STEMI, and ventricular fibrillation with LVAD who presents today for rectal bleeding.    Flexible sigmoidoscopy 2/5/25:  Findings:       Skin tags were found on perianal exam.        A moderate amount of stool was found in the entire colon, interfering        with visualization. Large amount of solid stool in the proximal        descending colon which was the furthest extent of the exam        Many large-mouthed diverticula were found in the sigmoid colon.        A localized area of moderately altered vascular, congested and        erythematous mucosa was found in the rectum.     He did Carafate enemas and bleeding has significantly improved.  He only has a mild amount of bright red blood when he has hard bowel movements about once a week.  No pain with bowel movements.  No prolapsing tissue.  He is not on any bowel medications.    Physical Examination:  Pulse 65   Ht 5' 6\"   Wt 170 lb   SpO2 96%   BMI 27.44 kg/m    General: alert, oriented, in no acute distress, sitting comfortably  Chaperone: Allie Cobos, EMT   Perianal External Examination:  Perianal skin: Intact with no excoriation or lichenification.  Lesions: No evidence of an external lesion, nodularity, or induration in the perianal region.  Eversion of buttocks: There was not evidence of an anal fissure. Details: N/A.  Skin tags or external hemorrhoids: None.    Digital Rectal Examination: Was performed.   Sphincter tone: Good.  Palpable lesions: No.  Other: None.  Bimanual examination: was not performed.    Anoscopy: Was performed.   Hemorrhoids: No significant internal " hemorrhoids.  Lesions: Telangiectasia in the distal rectum visible without active bleeding.    Assessment/Plan: Duane C Johnson is a 75 year old male with complex cardiac history with LVAD and radiation proctitis and intermittent rectal bleeding.  Symptoms have significantly improved after Carafate enemas.  However, he continues to have a small amount of bright red blood once a week with hard bowel movements.  No significant hemorrhoids on anoscopy but some evidence of radiation proctitis without bleeding on exam today.  I would like him to try a fiber supplement daily to improve his stool consistency to see if this stops the bleeding.  If it does not, could consider formalin application in clinic.  Will have him return to clinic after 6 weeks if bleeding persists. Patient's questions were answered to his stated satisfaction and he is in agreement with this plan.       Past Medical History:   Diagnosis Date    Benign essential hypertension     CAD (coronary artery disease)     Chronic systolic heart failure (H)     Hypertension     ST elevation myocardial infarction (STEMI), unspecified artery (H)     Ventricular fibrillation (H)      Past Surgical History:   Procedure Laterality Date    ANESTHESIA CARDIOVERSION N/A 9/5/2024    Procedure: Anesthesia cardioversion;  Surgeon: GENERIC ANESTHESIA PROVIDER;  Location[facility]    ANESTHESIA CARDIOVERSION N/A 9/30/2024    Procedure: Anesthesia cardioversion;  Surgeon: GENERIC ANESTHESIA PROVIDER;  [facility]    COLONOSCOPY N/A 3/23/2023    Procedure: COLONOSCOPY, FLEXIBLE, WITH LESION REMOVAL USING SNARE;  Surgeon: Jose Faustin MD;  Location: [facility]    CV CENTRAL VENOUS CATHETER PLACEMENT N/A 10/26/2023    Procedure: Central Venous Catheter Placement;  Surgeon: Rob Lyles MD;  Location: [facility] CARDIAC CATH LAB    CV CORONARY ANGIOGRAM N/A 10/27/2023    Procedure: Coronary Angiogram;  Surgeon: Rob Lyles MD;  Location: [facility] CARDIAC  CATH LAB    CV CORONARY ANGIOGRAM N/A 10/26/2023    Procedure: Coronary Angiogram;  Surgeon: Rob Lyles MD;  [facility]    CV LEFT HEART CATH N/A 10/26/2023    Procedure: Left Heart Catheterization;  Surgeon: Rob Lyles MD;  [facility]    CV PCI N/A 10/27/2023    Procedure: Percutaneous Coronary Intervention;  Surgeon: Rob Lyles MD;  [facility]    CV PCI STENT DRUG ELUTING N/A 10/26/2023    Procedure: Percutaneous Coronary Intervention Stent;  Surgeon: Rob Lyles MD;  [facility]    CV RIGHT HEART CATH MEASUREMENTS RECORDED N/A 5/6/2024    Procedure: Heart Cath Right Heart Cath;  Surgeon: Rob Lyles MD;  [facility]    CV RIGHT HEART CATH MEASUREMENTS RECORDED N/A 9/3/2024    Procedure: Right Heart Catheterization;  Surgeon: Aaron Mesa MD;  [facility]    CV RIGHT HEART CATH MEASUREMENTS RECORDED N/A 10/24/2024    Procedure: Right Heart Catheterization;  Surgeon: Haile Braden MD;  [facility]    CV RIGHT HEART CATH MEASUREMENTS RECORDED N/A 3/18/2025    Procedure: Right Heart Catheterization with VAD Speed Optimization (no echo);  Surgeon: Aaron Mesa MD;  [facility]    EP ICD INSERT SINGLE N/A 5/8/2024    Procedure: Implantable Cardioverter Defibrillator Device & Lead Implant Dual;  Surgeon: Guero Black MD;  Location: [facility] CARDIAC CATH LAB    INJECTION, HYDROGEL SPACER N/A 4/22/2024    Procedure: INJECTION, HYDROGEL SPACER AND FIDUCIAL MARKER PLACEMENT;  Surgeon: Stanislaw Barros MD;  [facility]    INSERT VENTRICULAR ASSIST DEVICE LEFT (HEARTMATE II) N/A 9/18/2024    Procedure: Median Sternotomy, INSERTION of LEFT VENTRICULAR ASSIST DEVICE (HEARTMATE III), cardiopulmonary bypass, transesophageal  echocardiogram performed by anesthesia.;  Surgeon: Mulvihill, Michael, MD;  [facility]    IRRIGATION AND DEBRIDEMENT CHEST WASHOUT, COMBINED Right 9/18/2024    Procedure: Exploration of chest, chest washout;  Surgeon: Mulvihill, Michael, MD;  [facility]    SIGMOIDOSCOPY FLEXIBLE N/A 2/5/2025    Procedure: Sigmoidoscopy flexible;  Surgeon: Jp Cartwright MD;  Location: [facility]     Current Outpatient Medications   Medication Sig Dispense Refill    acetaminophen (TYLENOL) 325 MG tablet Take 2 tablets (650 mg) by mouth every 4 hours as needed for other (For optimal non-opioid multimodal pain management to improve pain control.).      amiodarone (PACERONE) 200 MG tablet Take 1 tablet (200 mg) by mouth daily. 90 tablet 3    amLODIPine (NORVASC) 10 MG tablet Take 0.5 tablets (5 mg) by mouth daily. 45 tablet 3    atorvastatin (LIPITOR) 80 MG tablet Take 1 tablet (80 mg) by mouth every evening. 90 tablet 3    digoxin (LANOXIN) 62.5 MCG tablet Take 1 tablet (62.5 mcg) by mouth daily. 90 tablet 0    escitalopram (LEXAPRO) 10 MG tablet Take 1 tablet (10 mg) by mouth or Feeding Tube daily. 90 tablet 3    gabapentin (NEURONTIN) 100 MG capsule Take 1 capsule (100 mg) by mouth or Feeding Tube at bedtime. 90 capsule 3    hydrALAZINE (APRESOLINE) 25 MG tablet Take 1 tablet (25 mg) by mouth 2 times daily. 180 tablet 3    magnesium hydroxide (MILK OF MAGNESIA) 400 MG/5ML suspension Take 30 mLs by mouth daily as needed for constipation (Use if polyethylene glycol (Miralax) is not effective after 24 hours.). 354 mL 0    magnesium oxide (MAG-OX) 400 MG tablet Take 1 tablet (400 mg) by mouth daily. 90 tablet 3    melatonin 10 MG TABS tablet Take 1 tablet (10 mg) by mouth every evening.      multivitamin w/minerals (THERA-VIT-M) tablet Take 1 tablet by mouth daily. 90 tablet 3    warfarin ANTICOAGULANT (COUMADIN) 2.5 MG tablet Take 0.5 tablets (1.25 mg) by mouth daily.       Allergies   Allergen Reactions    Brilinta  [Ticagrelor] Other (See Comments) and Difficulty breathing     Per pt and spouse, hyperventilation.    Clonazepam Other (See Comments)     Per spouse, acted like a zombie and he was shaky, could barely talk.     Family History   Problem Relation Age of Onset    Other Cancer Mother     Obesity Mother     GI problems Father     Uterine Cancer Sister      Social History     Tobacco Use    Smoking status: Former     Current packs/day: 0.00     Average packs/day: 0.3 packs/day for 30.0 years (7.5 ttl pk-yrs)     Types: Cigarettes     Start date: 10/26/1993     Quit date: 10/26/2023     Years since quittin.5     Passive exposure: Past    Smokeless tobacco: Never    Tobacco comments:     Quit 10/26/2023   Substance Use Topics    Alcohol use: Yes     Comment: 1-2 beers per day    Marital status: .    NADEGE Mauro CNP  Colon and Rectal Surgery   [facility]      No LOS data to display  Time spent on date of encounter doing chart review, history and exam, documentation, and further activities in this note. An additional 2 minutes was spent for anoscopy.

## 2025-04-30 NOTE — NURSING NOTE
"Chief Complaint   Patient presents with    Consult     Radiation proctitis       Vitals:    04/30/25 1454   Pulse: 65   SpO2: 96%   Weight: 170 lb   Height: 5' 6\"       Body mass index is 27.44 kg/m .    Allie Cobos, EMT  "

## 2025-05-15 ENCOUNTER — TELEPHONE (OUTPATIENT)
Dept: ANTICOAGULATION | Facility: CLINIC | Age: 75
End: 2025-05-15
Payer: MEDICARE

## 2025-05-15 NOTE — TELEPHONE ENCOUNTER
ANTICOAGULATION     Duane C Johnson is overdue for an INR check.     Spoke with Melissa and scheduled lab appointment on 5/16/25    Namrata Walker, RN  5/15/2025  Anticoagulation Clinic  [facility] for routing messages: susannah MARTINEZ LVAD  [facility] patient phone line: [phone]

## 2025-05-16 ENCOUNTER — RESULTS FOLLOW-UP (OUTPATIENT)
Dept: ANTICOAGULATION | Facility: CLINIC | Age: 75
End: 2025-05-16

## 2025-05-17 ENCOUNTER — HEALTH MAINTENANCE LETTER (OUTPATIENT)
Age: 75
End: 2025-05-17

## 2025-05-19 ENCOUNTER — RESULTS FOLLOW-UP (OUTPATIENT)
Dept: ANTICOAGULATION | Facility: CLINIC | Age: 75
End: 2025-05-19

## 2025-06-01 LAB — INR HOME MONITORING: 2.8 RATIO (ref 2–3)

## 2025-06-18 ENCOUNTER — OFFICE VISIT (OUTPATIENT)
Dept: SURGERY | Facility: CLINIC | Age: 75
End: 2025-06-18
Payer: MEDICARE

## 2025-06-18 VITALS
DIASTOLIC BLOOD PRESSURE: 64 MMHG | BODY MASS INDEX: 27.44 KG/M2 | SYSTOLIC BLOOD PRESSURE: 84 MMHG | HEART RATE: 65 BPM | OXYGEN SATURATION: 99 % | HEIGHT: 66 IN

## 2025-06-18 DIAGNOSIS — K62.7 RADIATION PROCTITIS: Primary | ICD-10-CM

## 2025-06-18 DIAGNOSIS — K62.5 RECTAL BLEEDING: ICD-10-CM

## 2025-06-18 PROCEDURE — 1126F AMNT PAIN NOTED NONE PRSNT: CPT | Performed by: NURSE PRACTITIONER

## 2025-06-18 PROCEDURE — 3074F SYST BP LT 130 MM HG: CPT | Performed by: NURSE PRACTITIONER

## 2025-06-18 PROCEDURE — 3078F DIAST BP <80 MM HG: CPT | Performed by: NURSE PRACTITIONER

## 2025-06-18 PROCEDURE — 99213 OFFICE O/P EST LOW 20 MIN: CPT | Mod: 25 | Performed by: NURSE PRACTITIONER

## 2025-06-18 PROCEDURE — 45300 PROCTOSIGMOIDOSCOPY DX: CPT | Performed by: NURSE PRACTITIONER

## 2025-06-18 ASSESSMENT — PAIN SCALES - GENERAL: PAINLEVEL_OUTOF10: NO PAIN (0)

## 2025-06-18 NOTE — NURSING NOTE
"Chief Complaint   Patient presents with    RECHECK     Return, follow up for radiation proctitis, bleeding with bowel movements       Vitals:    06/18/25 1509   BP: (!) 84/64   BP Location: Left arm   Patient Position: Sitting   Cuff Size: Adult Regular   Pulse: 65   SpO2: 99%   Height: 5' 6\"       Body mass index is 27.44 kg/m .                          Coco Worthington, EMT    "

## 2025-06-18 NOTE — PROGRESS NOTES
"Colon and Rectal Surgery Consult Clinic Note    Referring provider:  Ham Cruz MD  [address] [place] [address]     Patient: Duane C Johnson  YOB: 1950  Date of Visit: 6/18/2025    Duane C Johnson is a very pleasant 75 year old male with history of prostate cancer s/p radiation 6/2024 and PMH significant for CAD, systolic heart failure, STEMI, and ventricular fibrillation with LVAD who presents today for rectal bleeding.    Flexible sigmoidoscopy 2/5/25:  Findings:       Skin tags were found on perianal exam.        A moderate amount of stool was found in the entire colon, interfering        with visualization. Large amount of solid stool in the proximal        descending colon which was the furthest extent of the exam        Many large-mouthed diverticula were found in the sigmoid colon.        A localized area of moderately altered vascular, congested and        erythematous mucosa was found in the rectum.     He did Carafate enemas and bleeding had significantly improved when I saw him last in April. He now is having bleeding again that fills the toilet bowl.    Physical Examination:  BP (!) 84/64 (BP Location: Left arm, Patient Position: Sitting, Cuff Size: Adult Regular)   Pulse 65   Ht 5' 6\"   SpO2 99%   BMI 27.44 kg/m    General: alert, oriented, in no acute distress, sitting comfortably  Chaperone: Allie Cobos, EMT   Perianal External Examination:  Perianal skin: Intact with no excoriation or lichenification.  Lesions: No evidence of an external lesion, nodularity, or induration in the perianal region.  Eversion of buttocks: There was not evidence of an anal fissure. Details: N/A.  Skin tags or external hemorrhoids: None.    Digital Rectal Examination: Was performed.   Sphincter tone: Good.  Palpable lesions: No.  Other: None.    Procedure:  I discussed the risks, benefits, and alternatives in detail with the and answered all of his questions to his stated " satisfaction.  We discussed the possibilities of rectal injury, stenosis, and ongoing bleeding that might require further therapy. I advised that he may require several topical formalin treatments. Duane expressed understanding of these risks and he provided written informed consent.    Proctoscopy was performed into the lower rectum using an adult proctoscope. In the distal-most rectum, there were visible actively bleeding telangiectasias consistent with radiation proctitis. I treated these with topical formalin and copiously irrigated the rectum with water after the treatment was complete. The patient tolerated this well.    Assessment/Plan: Duane C Johnson is a 75 year old male with complex cardiac history with LVAD and radiation proctitis and intermittent rectal bleeding.  Symptoms had significantly improved after Carafate enemas but have started up again and he is having bleeding into the toilet bowl. Tolerated formalin application in clinic today. Will have him return to clinic after 4 weeks if bleeding persists. Patient's questions were answered to his stated satisfaction and he is in agreement with this plan.       Past Medical History:   Diagnosis Date    Benign essential hypertension 1/26/2016    CAD (coronary artery disease)     Chronic systolic heart failure (H)     Hypertension     ST elevation myocardial infarction (STEMI), unspecified artery (H) 10/26/2023    Ventricular fibrillation (H)      Past Surgical History:   Procedure Laterality Date    ANESTHESIA CARDIOVERSION N/A 9/5/2024    Procedure: Anesthesia cardioversion;  Surgeon: GENERIC ANESTHESIA PROVIDER;  [facility]    ANESTHESIA CARDIOVERSION N/A 9/30/2024    Procedure: Anesthesia cardioversion;  Surgeon: GENERIC ANESTHESIA PROVIDER;  [facility]    COLONOSCOPY N/A 3/23/2023    Procedure: COLONOSCOPY, FLEXIBLE, WITH LESION REMOVAL USING SNARE;  Surgeon: Jose Faustin MD;  Location: [facility]    CV CENTRAL VENOUS CATHETER  PLACEMENT N/A 10/26/2023    Procedure: Central Venous Catheter Placement;  Surgeon: Rob Lyles MD;  Location:  [facility]    CV CORONARY ANGIOGRAM N/A 10/27/2023    Procedure: Coronary Angiogram;  Surgeon: Rob Lyles MD;  Location:  [facility] CATH [facility]    CV CORONARY ANGIOGRAM N/A 10/26/2023    Procedure: Coronary Angiogram;  Surgeon: Rob Lyles MD;  Location:  [facility] CATH [facility]    CV LEFT HEART CATH N/A 10/26/2023    Procedure: Left Heart Catheterization;  Surgeon: Rob Lyles MD;  Location:  [facility] CATH LAB    CV PCI N/A 10/27/2023    Procedure: Percutaneous Coronary Intervention;  Surgeon: Rob Lyles MD;  Location:  [facility] CATH LAB    CV PCI STENT DRUG ELUTING N/A 10/26/2023    Procedure: Percutaneous Coronary Intervention Stent;  Surgeon: Rob Lyles MD;  Location:  [facility] CATH LAB    CV RIGHT HEART CATH MEASUREMENTS RECORDED N/A 5/6/2024    Procedure: Heart Cath Right Heart Cath;  Surgeon: Rob Lyles MD;  Location:  [facility] CARD[facility] CATH LAB    CV RIGHT HEART CATH MEASUREMENTS RECORDED N/A 9/3/2024    Procedure: Right Heart Catheterization;  Surgeon: Aaron Mesa MD;  Location:  HEART CARDIAC CATH LAB    CV RIGHT HEART CATH MEASUREMENTS RECORDED N/A 10/24/2024    Procedure: Right Heart Catheterization;  Surgeon: Haile Braden MD;  Location:  HEART CARDIAC CATH LAB    CV RIGHT HEART CATH MEASUREMENTS RECORDED N/A 3/18/2025    Procedure: Right Heart Catheterization with VAD Speed Optimization (no echo);  Surgeon: Aaron Mesa MD;  Location: [facility] CARDIAC CATH LAB    EP ICD INSERT SINGLE N/A 5/8/2024    Procedure: Implantable Cardioverter Defibrillator Device & Lead Implant Dual;  Surgeon: Guero Black MD;  Location:  HEART CARDIAC CATH LAB    INJECTION, HYDROGEL SPACER N/A 4/22/2024    Procedure: INJECTION, HYDROGEL SPACER AND  FIDUCIAL MARKER PLACEMENT;  Surgeon: Stanislaw Barros MD;  [facility]    INSERT VENTRICULAR ASSIST DEVICE LEFT (HEARTMATE II) N/A 9/18/2024    Procedure: Median Sternotomy, INSERTION of LEFT VENTRICULAR ASSIST DEVICE (HEARTMATE III), cardiopulmonary bypass, transesophageal echocardiogram performed by anesthesia.;  Surgeon: Mulvihill, Michael, MD;  [facility]    IRRIGATION AND DEBRIDEMENT CHEST WASHOUT, COMBINED Right 9/18/2024    Procedure: Exploration of chest, chest washout;  Surgeon: Mulvihill, Michael, MD;  [facility]    SIGMOIDOSCOPY FLEXIBLE N/A 2/5/2025    Procedure: Sigmoidoscopy flexible;  Surgeon: Jp Cartwright MD;  Location: [facility]     Current Outpatient Medications   Medication Sig Dispense Refill    acetaminophen (TYLENOL) 325 MG tablet Take 2 tablets (650 mg) by mouth every 4 hours as needed for other (For optimal non-opioid multimodal pain management to improve pain control.).      amiodarone (PACERONE) 200 MG tablet Take 1 tablet (200 mg) by mouth daily. 90 tablet 3    amLODIPine (NORVASC) 10 MG tablet Take 0.5 tablets (5 mg) by mouth daily. 45 tablet 3    atorvastatin (LIPITOR) 80 MG tablet Take 1 tablet (80 mg) by mouth every evening. 90 tablet 3    digoxin (LANOXIN) 62.5 MCG tablet Take 1 tablet (62.5 mcg) by mouth daily. 90 tablet 0    escitalopram (LEXAPRO) 10 MG tablet Take 1 tablet (10 mg) by mouth or Feeding Tube daily. 90 tablet 3    gabapentin (NEURONTIN) 100 MG capsule Take 1 capsule (100 mg) by mouth or Feeding Tube at bedtime. 90 capsule 3    hydrALAZINE (APRESOLINE) 25 MG tablet Take 1 tablet (25 mg) by mouth 2 times daily. 180 tablet 3    magnesium hydroxide (MILK OF MAGNESIA) 400 MG/5ML suspension Take 30 mLs by mouth daily as needed for constipation (Use if polyethylene glycol (Miralax) is not effective after 24 hours.). 354 mL 0    magnesium oxide (MAG-OX) 400 MG tablet Take 1 tablet (400 mg) by mouth daily. 90 tablet 3    melatonin 10 MG TABS tablet Take 1  tablet (10 mg) by mouth every evening.      multivitamin w/minerals (THERA-VIT-M) tablet Take 1 tablet by mouth daily. 90 tablet 3    warfarin ANTICOAGULANT (COUMADIN) 2.5 MG tablet Take 0.5 tablets (1.25 mg) by mouth daily.       Allergies   Allergen Reactions    Brilinta [Ticagrelor] Other (See Comments) and Difficulty breathing     Per pt and spouse, hyperventilation.    Clonazepam Other (See Comments)     Per spouse, acted like a zombie and he was shaky, could barely talk.     Family History   Problem Relation Age of Onset    Other Cancer Mother     Obesity Mother     GI problems Father     Uterine Cancer Sister      Social History     Tobacco Use    Smoking status: Former     Current packs/day: 0.00     Average packs/day: 0.3 packs/day for 30.0 years (7.5 ttl pk-yrs)     Types: Cigarettes     Start date: 10/26/1993     Quit date: 10/26/2023     Years since quittin.6     Passive exposure: Past    Smokeless tobacco: Never    Tobacco comments:     Quit 10/26/2023   Substance Use Topics    Alcohol use: Yes     Comment: 1-2 beers per day    Marital status: .    NADEGE Mauro CNP  Colon and Rectal Surgery   [facility]      No LOS data to display  Time spent on date of encounter doing chart review, history and exam, documentation, and further activities in this note. An additional 15 minutes was spent for rigid proctoscopy and formalin application.

## 2025-06-18 NOTE — LETTER
"6/18/2025       RE: Duane C Johnson  [id number] [address] [address]     Dear Colleague,    Thank you for referring your patient, Duane C Johnson, to the [facility] COLON AND RECTAL [facility] [place] at [facility]. Please see a copy of my visit note below.    Colon and Rectal Surgery Consult Clinic Note    Referring provider:  Ham Cruz MD  [address] [place] [id number]     Patient: Duane C Johnson  YOB: 1950  Date of Visit: 6/18/2025    Duane C Johnson is a very pleasant 75 year old male with history of prostate cancer s/p radiation 6/2024 and PMH significant for CAD, systolic heart failure, STEMI, and ventricular fibrillation with LVAD who presents today for rectal bleeding.    Flexible sigmoidoscopy 2/5/25:  Findings:       Skin tags were found on perianal exam.        A moderate amount of stool was found in the entire colon, interfering        with visualization. Large amount of solid stool in the proximal        descending colon which was the furthest extent of the exam        Many large-mouthed diverticula were found in the sigmoid colon.        A localized area of moderately altered vascular, congested and        erythematous mucosa was found in the rectum.     He did Carafate enemas and bleeding had significantly improved when I saw him last in April. He now is having bleeding again that fills the toilet bowl.    Physical Examination:  BP (!) 84/64 (BP Location: Left arm, Patient Position: Sitting, Cuff Size: Adult Regular)   Pulse 65   Ht 5' 6\"   SpO2 99%   BMI 27.44 kg/m    General: alert, oriented, in no acute distress, sitting comfortably  Chaperone: Allie Cobos, EMT   Perianal External Examination:  Perianal skin: Intact with no excoriation or lichenification.  Lesions: No evidence of an external lesion, nodularity, or induration in the perianal region.  Eversion of buttocks: There was not evidence " of an anal fissure. Details: N/A.  Skin tags or external hemorrhoids: None.    Digital Rectal Examination: Was performed.   Sphincter tone: Good.  Palpable lesions: No.  Other: None.    Procedure:  I discussed the risks, benefits, and alternatives in detail with the and answered all of his questions to his stated satisfaction.  We discussed the possibilities of rectal injury, stenosis, and ongoing bleeding that might require further therapy. I advised that he may require several topical formalin treatments. Duane expressed understanding of these risks and he provided written informed consent.    Proctoscopy was performed into the lower rectum using an adult proctoscope. In the distal-most rectum, there were visible actively bleeding telangiectasias consistent with radiation proctitis. I treated these with topical formalin and copiously irrigated the rectum with water after the treatment was complete. The patient tolerated this well.    Assessment/Plan: Duane C Johnson is a 75 year old male with complex cardiac history with LVAD and radiation proctitis and intermittent rectal bleeding.  Symptoms had significantly improved after Carafate enemas but have started up again and he is having bleeding into the toilet bowl. Tolerated formalin application in clinic today. Will have him return to clinic after 4 weeks if bleeding persists. Patient's questions were answered to his stated satisfaction and he is in agreement with this plan.       Past Medical History:   Diagnosis Date     Benign essential hypertension 1/26/2016     CAD (coronary artery disease)      Chronic systolic heart failure (H)      Hypertension      ST elevation myocardial infarction (STEMI), unspecified artery (H) 10/26/2023     Ventricular fibrillation (H)      Past Surgical History:   Procedure Laterality Date     ANESTHESIA CARDIOVERSION N/A 9/5/2024    Procedure: Anesthesia cardioversion;  Surgeon: GENERIC ANESTHESIA PROVIDER;  Location:  OR      ANESTHESIA CARDIOVERSION N/A 9/30/2024    Procedure: Anesthesia cardioversion;  Surgeon: GENERIC ANESTHESIA PROVIDER;  [facility]     COLONOSCOPY N/A 3/23/2023    Procedure: COLONOSCOPY, FLEXIBLE, WITH LESION REMOVAL USING SNARE;  Surgeon: Jose Faustin MD;  Location: [facility]     CV CENTRAL VENOUS CATHETER PLACEMENT N/A 10/26/2023    Procedure: Central Venous Catheter Placement;  Surgeon: Rob Lyles MD;  [facility]     CV CORONARY ANGIOGRAM N/A 10/27/2023    Procedure: Coronary Angiogram;  Surgeon: Rob Lyles MD;  Location:  HE[facility] CARDIAC CATH LAB     CV CORONARY ANGIOGRAM N/A 10/26/2023    Procedure: Coronary Angiogram;  Surgeon: Rob Lyles MD;  Location:  HEART CARDIAC CATH LAB     CV LEFT HEART CATH N/A 10/26/2023    Procedure: Left Heart Catheterization;  Surgeon: Rob Lyles MD;  Location:  [facility] CARDIAC CATH LAB     CV PCI N/A 10/27/2023    Procedure: Percutaneous Coronary Intervention;  Surgeon: Rob Lyles MD;  Location:  [facility] CARDIAC CATH LAB     CV PCI STENT DRUG ELUTING N/A 10/26/2023    Procedure: Percutaneous Coronary Intervention Stent;  Surgeon: Rob Lyles MD;  [facility]     CV RIGHT HEART CATH MEASUREMENTS RECORDED N/A 5/6/2024    Procedure: Heart Cath Right Heart Cath;  Surgeon: Rob Lyles MD;  [facility]     CV RIGHT HEART CATH MEASUREMENTS RECORDED N/A 9/3/2024    Procedure: Right Heart Catheterization;  Surgeon: Aaron Mesa MD;  [facility]     CV RIGHT HEART CATH MEASUREMENTS RECORDED N/A 10/24/2024    Procedure: Right Heart Catheterization;  Surgeon: Haile Braden MD;  Location:  HEART CARDIAC CATH LAB     CV RIGHT HEART CATH MEASUREMENTS RECORDED N/A 3/18/2025    Procedure: Right Heart Catheterization with VAD Speed Optimization (no echo);  Surgeon: Aaron Mesa  MD BERNARD;  Location:  HE[facility] CARDIAC CATH LAB     EP ICD INSERT SINGLE N/A 5/8/2024    Procedure: Implantable Cardioverter Defibrillator Device & Lead Implant Dual;  Surgeon: Guero Black MD;  Location:  HEART CARDIAC CATH LAB     INJECTION, HYDROGEL SPACER N/A 4/22/2024    Procedure: INJECTION, HYDROGEL SPACER AND FIDUCIAL MARKER PLACEMENT;  Surgeon: Stanislaw Barros MD;  [facility]     INSERT VENTRICULAR ASSIST DEVICE LEFT (HEARTMATE II) N/A 9/18/2024    Procedure: Median Sternotomy, INSERTION of LEFT VENTRICULAR ASSIST DEVICE (HEARTMATE III), cardiopulmonary bypass, transesophageal echocardiogram performed by anesthesia.;  Surgeon: Mulvihill, Michael, MD;  [facility]     IRRIGATION AND DEBRIDEMENT CHEST WASHOUT, COMBINED Right 9/18/2024    Procedure: Exploration of chest, chest washout;  Surgeon: Mulvihill, Michael, MD;  Location:  OR     SIGMOIDOSCOPY FLEXIBLE N/A 2/5/2025    Procedure: Sigmoidoscopy flexible;  Surgeon: Jp Cartwright MD;  Location:  GI     Current Outpatient Medications   Medication Sig Dispense Refill     acetaminophen (TYLENOL) 325 MG tablet Take 2 tablets (650 mg) by mouth every 4 hours as needed for other (For optimal non-opioid multimodal pain management to improve pain control.).       amiodarone (PACERONE) 200 MG tablet Take 1 tablet (200 mg) by mouth daily. 90 tablet 3     amLODIPine (NORVASC) 10 MG tablet Take 0.5 tablets (5 mg) by mouth daily. 45 tablet 3     atorvastatin (LIPITOR) 80 MG tablet Take 1 tablet (80 mg) by mouth every evening. 90 tablet 3     digoxin (LANOXIN) 62.5 MCG tablet Take 1 tablet (62.5 mcg) by mouth daily. 90 tablet 0     escitalopram (LEXAPRO) 10 MG tablet Take 1 tablet (10 mg) by mouth or Feeding Tube daily. 90 tablet 3     gabapentin (NEURONTIN) 100 MG capsule Take 1 capsule (100 mg) by mouth or Feeding Tube at bedtime. 90 capsule 3     hydrALAZINE (APRESOLINE) 25 MG tablet Take 1 tablet (25 mg) by mouth 2 times daily. 180  tablet 3     magnesium hydroxide (MILK OF MAGNESIA) 400 MG/5ML suspension Take 30 mLs by mouth daily as needed for constipation (Use if polyethylene glycol (Miralax) is not effective after 24 hours.). 354 mL 0     magnesium oxide (MAG-OX) 400 MG tablet Take 1 tablet (400 mg) by mouth daily. 90 tablet 3     melatonin 10 MG TABS tablet Take 1 tablet (10 mg) by mouth every evening.       multivitamin w/minerals (THERA-VIT-M) tablet Take 1 tablet by mouth daily. 90 tablet 3     warfarin ANTICOAGULANT (COUMADIN) 2.5 MG tablet Take 0.5 tablets (1.25 mg) by mouth daily.       Allergies   Allergen Reactions     Brilinta [Ticagrelor] Other (See Comments) and Difficulty breathing     Per pt and spouse, hyperventilation.     Clonazepam Other (See Comments)     Per spouse, acted like a zombie and he was shaky, could barely talk.     Family History   Problem Relation Age of Onset     Other Cancer Mother      Obesity Mother      GI problems Father      Uterine Cancer Sister      Social History     Tobacco Use     Smoking status: Former     Current packs/day: 0.00     Average packs/day: 0.3 packs/day for 30.0 years (7.5 ttl pk-yrs)     Types: Cigarettes     Start date: 10/26/1993     Quit date: 10/26/2023     Years since quittin.6     Passive exposure: Past     Smokeless tobacco: Never     Tobacco comments:     Quit 10/26/2023   Substance Use Topics     Alcohol use: Yes     Comment: 1-2 beers per day    Marital status: .    NADEGE Mauro CNP  Colon and Rectal Surgery   [facility]      No LOS data to display  Time spent on date of encounter doing chart review, history and exam, documentation, and further activities in this note. An additional 15 minutes was spent for rigid proctoscopy and formalin application.    Again, thank you for allowing me to participate in the care of your patient.      Sincerely,    NADEGE Mauro CNP

## 2025-06-25 ENCOUNTER — ANCILLARY PROCEDURE (OUTPATIENT)
Dept: CARDIOLOGY | Facility: CLINIC | Age: 75
End: 2025-06-25
Attending: INTERNAL MEDICINE
Payer: MEDICARE

## 2025-06-25 DIAGNOSIS — I46.9 CARDIAC ARREST (H): ICD-10-CM

## 2025-06-25 DIAGNOSIS — Z95.810 ICD (IMPLANTABLE CARDIOVERTER-DEFIBRILLATOR) IN PLACE: ICD-10-CM

## 2025-06-25 DIAGNOSIS — I50.20 HEART FAILURE WITH REDUCED EJECTION FRACTION, NYHA CLASS I (H): ICD-10-CM

## 2025-06-25 DIAGNOSIS — I25.5 ISCHEMIC CARDIOMYOPATHY: ICD-10-CM

## 2025-06-25 PROCEDURE — 93296 REM INTERROG EVL PM/IDS: CPT

## 2025-06-25 PROCEDURE — 93295 DEV INTERROG REMOTE 1/2/MLT: CPT | Performed by: INTERNAL MEDICINE

## 2025-06-28 LAB
MDC_IDC_EPISODE_DTM: NORMAL
MDC_IDC_EPISODE_DURATION: 106 S
MDC_IDC_EPISODE_DURATION: 116 S
MDC_IDC_EPISODE_DURATION: 46 S
MDC_IDC_EPISODE_DURATION: 50 S
MDC_IDC_EPISODE_DURATION: 51 S
MDC_IDC_EPISODE_DURATION: 52 S
MDC_IDC_EPISODE_DURATION: 53 S
MDC_IDC_EPISODE_DURATION: 54 S
MDC_IDC_EPISODE_DURATION: 55 S
MDC_IDC_EPISODE_DURATION: 56 S
MDC_IDC_EPISODE_DURATION: 57 S
MDC_IDC_EPISODE_DURATION: 58 S
MDC_IDC_EPISODE_DURATION: 59 S
MDC_IDC_EPISODE_DURATION: 59 S
MDC_IDC_EPISODE_ID: NORMAL
MDC_IDC_EPISODE_TYPE: NORMAL
MDC_IDC_LEAD_CONNECTION_STATUS: NORMAL
MDC_IDC_LEAD_CONNECTION_STATUS: NORMAL
MDC_IDC_LEAD_IMPLANT_DT: NORMAL
MDC_IDC_LEAD_IMPLANT_DT: NORMAL
MDC_IDC_LEAD_LOCATION: NORMAL
MDC_IDC_LEAD_LOCATION: NORMAL
MDC_IDC_LEAD_LOCATION_DETAIL_1: NORMAL
MDC_IDC_LEAD_LOCATION_DETAIL_1: NORMAL
MDC_IDC_LEAD_MFG: NORMAL
MDC_IDC_LEAD_MFG: NORMAL
MDC_IDC_LEAD_MODEL: NORMAL
MDC_IDC_LEAD_MODEL: NORMAL
MDC_IDC_LEAD_POLARITY_TYPE: NORMAL
MDC_IDC_LEAD_POLARITY_TYPE: NORMAL
MDC_IDC_LEAD_SERIAL: NORMAL
MDC_IDC_LEAD_SERIAL: NORMAL
MDC_IDC_LEAD_SPECIAL_FUNCTION: NORMAL
MDC_IDC_LEAD_SPECIAL_FUNCTION: NORMAL
MDC_IDC_MSMT_BATTERY_DTM: NORMAL
MDC_IDC_MSMT_BATTERY_REMAINING_LONGEVITY: 150 MO
MDC_IDC_MSMT_BATTERY_REMAINING_PERCENTAGE: 100 %
MDC_IDC_MSMT_BATTERY_STATUS: NORMAL
MDC_IDC_MSMT_CAP_CHARGE_DTM: NORMAL
MDC_IDC_MSMT_CAP_CHARGE_TIME: 10.4 S
MDC_IDC_MSMT_CAP_CHARGE_TYPE: NORMAL
MDC_IDC_MSMT_LEADCHNL_RA_IMPEDANCE_VALUE: 586 OHM
MDC_IDC_MSMT_LEADCHNL_RA_PACING_THRESHOLD_AMPLITUDE: 0.4 V
MDC_IDC_MSMT_LEADCHNL_RA_PACING_THRESHOLD_PULSEWIDTH: 0.4 MS
MDC_IDC_MSMT_LEADCHNL_RV_IMPEDANCE_VALUE: 376 OHM
MDC_IDC_MSMT_LEADCHNL_RV_PACING_THRESHOLD_AMPLITUDE: 0.7 V
MDC_IDC_MSMT_LEADCHNL_RV_PACING_THRESHOLD_PULSEWIDTH: 0.4 MS
MDC_IDC_PG_IMPLANT_DTM: NORMAL
MDC_IDC_PG_MFG: NORMAL
MDC_IDC_PG_MODEL: NORMAL
MDC_IDC_PG_SERIAL: NORMAL
MDC_IDC_PG_TYPE: NORMAL
MDC_IDC_SESS_CLINIC_NAME: NORMAL
MDC_IDC_SESS_DTM: NORMAL
MDC_IDC_SESS_TYPE: NORMAL
MDC_IDC_SET_BRADY_AT_MODE_SWITCH_MODE: NORMAL
MDC_IDC_SET_BRADY_AT_MODE_SWITCH_RATE: 170 {BEATS}/MIN
MDC_IDC_SET_BRADY_LOWRATE: 60 {BEATS}/MIN
MDC_IDC_SET_BRADY_MAX_SENSOR_RATE: 130 {BEATS}/MIN
MDC_IDC_SET_BRADY_MAX_TRACKING_RATE: 130 {BEATS}/MIN
MDC_IDC_SET_BRADY_MODE: NORMAL
MDC_IDC_SET_BRADY_PAV_DELAY_HIGH: 200 MS
MDC_IDC_SET_BRADY_PAV_DELAY_LOW: 300 MS
MDC_IDC_SET_BRADY_SAV_DELAY_HIGH: 200 MS
MDC_IDC_SET_BRADY_SAV_DELAY_LOW: 300 MS
MDC_IDC_SET_LEADCHNL_RA_PACING_AMPLITUDE: 2 V
MDC_IDC_SET_LEADCHNL_RA_PACING_CAPTURE_MODE: NORMAL
MDC_IDC_SET_LEADCHNL_RA_PACING_POLARITY: NORMAL
MDC_IDC_SET_LEADCHNL_RA_PACING_PULSEWIDTH: 0.4 MS
MDC_IDC_SET_LEADCHNL_RA_SENSING_ADAPTATION_MODE: NORMAL
MDC_IDC_SET_LEADCHNL_RA_SENSING_POLARITY: NORMAL
MDC_IDC_SET_LEADCHNL_RA_SENSING_SENSITIVITY: 0.25 MV
MDC_IDC_SET_LEADCHNL_RV_PACING_AMPLITUDE: 2 V
MDC_IDC_SET_LEADCHNL_RV_PACING_CAPTURE_MODE: NORMAL
MDC_IDC_SET_LEADCHNL_RV_PACING_POLARITY: NORMAL
MDC_IDC_SET_LEADCHNL_RV_PACING_PULSEWIDTH: 0.4 MS
MDC_IDC_SET_LEADCHNL_RV_SENSING_ADAPTATION_MODE: NORMAL
MDC_IDC_SET_LEADCHNL_RV_SENSING_POLARITY: NORMAL
MDC_IDC_SET_LEADCHNL_RV_SENSING_SENSITIVITY: 0.6 MV
MDC_IDC_SET_ZONE_DETECTION_INTERVAL: 250 MS
MDC_IDC_SET_ZONE_DETECTION_INTERVAL: 300 MS
MDC_IDC_SET_ZONE_DETECTION_INTERVAL: 353 MS
MDC_IDC_SET_ZONE_STATUS: NORMAL
MDC_IDC_SET_ZONE_TYPE: NORMAL
MDC_IDC_SET_ZONE_VENDOR_TYPE: NORMAL
MDC_IDC_STAT_AT_BURDEN_PERCENT: 0 %
MDC_IDC_STAT_AT_DTM_END: NORMAL
MDC_IDC_STAT_AT_DTM_START: NORMAL
MDC_IDC_STAT_BRADY_DTM_END: NORMAL
MDC_IDC_STAT_BRADY_DTM_START: NORMAL
MDC_IDC_STAT_BRADY_RA_PERCENT_PACED: 60 %
MDC_IDC_STAT_BRADY_RV_PERCENT_PACED: 1 %
MDC_IDC_STAT_EPISODE_RECENT_COUNT: 0
MDC_IDC_STAT_EPISODE_RECENT_COUNT_DTM_END: NORMAL
MDC_IDC_STAT_EPISODE_RECENT_COUNT_DTM_START: NORMAL
MDC_IDC_STAT_EPISODE_TYPE: NORMAL
MDC_IDC_STAT_EPISODE_VENDOR_TYPE: NORMAL
MDC_IDC_STAT_TACHYTHERAPY_ATP_DELIVERED_RECENT: 0
MDC_IDC_STAT_TACHYTHERAPY_ATP_DELIVERED_TOTAL: 0
MDC_IDC_STAT_TACHYTHERAPY_RECENT_DTM_END: NORMAL
MDC_IDC_STAT_TACHYTHERAPY_RECENT_DTM_START: NORMAL
MDC_IDC_STAT_TACHYTHERAPY_SHOCKS_ABORTED_RECENT: 0
MDC_IDC_STAT_TACHYTHERAPY_SHOCKS_ABORTED_TOTAL: 0
MDC_IDC_STAT_TACHYTHERAPY_SHOCKS_DELIVERED_RECENT: 0
MDC_IDC_STAT_TACHYTHERAPY_SHOCKS_DELIVERED_TOTAL: 0
MDC_IDC_STAT_TACHYTHERAPY_TOTAL_DTM_END: NORMAL
MDC_IDC_STAT_TACHYTHERAPY_TOTAL_DTM_START: NORMAL

## 2025-06-30 NOTE — PLAN OF CARE
See Flowsheet for Immunotherapy administration.   Patient waited in clinic 30 mins for observation.  Patient had Epi Pen on hand.  No reactions noted at this time.      Temp: 97.8  F (36.6  C) Temp src: Oral BP: 102/75 Pulse: 69   Resp: 18 SpO2: 99 % O2 Device: None (Room air)      Neuro: A&O x4. Able to call appropriately & make needs known. Pt is sometimes forgetful & slightly hard of hearing - pt baseline per wife.  Cardiac: Tele in place, V-Paced around 75%. Afebrile  Resp: VSS on RA. No SOB reported.   GI/: Voiding well via urinal and up to the bathroom. No BM per pt today - denies nausea. Mechanical soft diet after multiple teeth pulled & 2L FR. Tolerating well.  Skin: No new deficits noted.  Pain: Chronic back pain well controlled w/ PRN tylenol, robaxin, heat therapy & pain creams. Pt educated to call if he needs pain meds.   Electrolytes: K, Mag, & Hep protocols. K not replaced - recheck in AM. Mag replaced - recheck in AM. Heparin therapeutic x1 next Xa @ 1930.  LDAs: L PIV in place infusing Heparin @ 1100 units/hr.  Activity: Up independently in the room & walking halls with wife. Pt educated to call before getting up if he feels unsteady or dizzy.      Plan: Continue to monitor and follow POC. Plan for scheduled LVAD placement 9/18. Notify C2 with changes

## 2025-07-01 ENCOUNTER — TELEPHONE (OUTPATIENT)
Dept: SURGERY | Facility: CLINIC | Age: 75
End: 2025-07-01
Payer: MEDICARE

## 2025-07-01 NOTE — TELEPHONE ENCOUNTER
[facility] Call Center    Phone Message    May a detailed message be left on voicemail: yes     Reason for Call: Symptoms or Concerns     If patient has red-flag symptoms, warm transfer to triage line    Current symptom or concern: Rectal Bleeding    Symptoms have been present for:  2-3 day(s)    Has patient previously been seen for this? Yes    By: Hayde Gunn    Date: 6/18/25    Are there any new or worsening symptoms? Yes: Pt's rectal bleeding is worsening    Action Taken: Message routed to:  [facility]    Travel Screening: Not Applicable     Date of Service:

## 2025-07-01 NOTE — TELEPHONE ENCOUNTER
S/p formalin with Hayde Arcos NP on 6/18. Calling in with increased bleeding over the last couple days. Feeling lightheaded/dizziness. Scheduled repeat formalin for 7/7 per Hayde Arcos NP.

## 2025-07-02 ENCOUNTER — MYC MEDICAL ADVICE (OUTPATIENT)
Dept: ANTICOAGULATION | Facility: CLINIC | Age: 75
End: 2025-07-02

## 2025-07-02 ENCOUNTER — OFFICE VISIT (OUTPATIENT)
Dept: CARDIOLOGY | Facility: CLINIC | Age: 75
End: 2025-07-02
Attending: PHYSICIAN ASSISTANT
Payer: MEDICARE

## 2025-07-02 ENCOUNTER — LAB (OUTPATIENT)
Dept: LAB | Facility: CLINIC | Age: 75
End: 2025-07-02
Attending: PHYSICIAN ASSISTANT
Payer: MEDICARE

## 2025-07-02 ENCOUNTER — RESULTS FOLLOW-UP (OUTPATIENT)
Dept: ANTICOAGULATION | Facility: CLINIC | Age: 75
End: 2025-07-02

## 2025-07-02 ENCOUNTER — ANTICOAGULATION THERAPY VISIT (OUTPATIENT)
Dept: ANTICOAGULATION | Facility: CLINIC | Age: 75
End: 2025-07-02

## 2025-07-02 VITALS
OXYGEN SATURATION: 99 % | SYSTOLIC BLOOD PRESSURE: 62 MMHG | HEART RATE: 90 BPM | WEIGHT: 174.6 LBS | BODY MASS INDEX: 28.18 KG/M2

## 2025-07-02 DIAGNOSIS — Z95.811 LVAD (LEFT VENTRICULAR ASSIST DEVICE) PRESENT (H): ICD-10-CM

## 2025-07-02 DIAGNOSIS — T82.7XXA INFECTION ASSOCIATED WITH DRIVELINE OF VENTRICULAR ASSIST DEVICE: ICD-10-CM

## 2025-07-02 DIAGNOSIS — I50.22 CHRONIC SYSTOLIC CONGESTIVE HEART FAILURE (H): Primary | ICD-10-CM

## 2025-07-02 DIAGNOSIS — I48.91 ATRIAL FIBRILLATION, UNSPECIFIED TYPE (H): ICD-10-CM

## 2025-07-02 DIAGNOSIS — T82.7XXA INFECTION ASSOCIATED WITH DRIVELINE OF LEFT VENTRICULAR ASSIST DEVICE (LVAD): ICD-10-CM

## 2025-07-02 DIAGNOSIS — D62 ANEMIA DUE TO BLOOD LOSS, ACUTE: ICD-10-CM

## 2025-07-02 DIAGNOSIS — Z79.01 ANTICOAGULATED ON WARFARIN: ICD-10-CM

## 2025-07-02 DIAGNOSIS — I50.42 CHRONIC COMBINED SYSTOLIC AND DIASTOLIC CONGESTIVE HEART FAILURE (H): ICD-10-CM

## 2025-07-02 DIAGNOSIS — I50.22 CHRONIC SYSTOLIC CONGESTIVE HEART FAILURE (H): ICD-10-CM

## 2025-07-02 DIAGNOSIS — I95.89 OTHER SPECIFIED HYPOTENSION: ICD-10-CM

## 2025-07-02 DIAGNOSIS — I50.42 CHRONIC COMBINED SYSTOLIC AND DIASTOLIC HEART FAILURE (H): ICD-10-CM

## 2025-07-02 DIAGNOSIS — M26.79 TORI PRESENT ON RESIDUAL ALVEOLAR RIDGE OF MAXILLA: ICD-10-CM

## 2025-07-02 LAB
ALBUMIN SERPL BCG-MCNC: 3.8 G/DL (ref 3.5–5.2)
ALP SERPL-CCNC: 112 U/L (ref 40–150)
ALT SERPL W P-5'-P-CCNC: 20 U/L (ref 0–70)
ANION GAP SERPL CALCULATED.3IONS-SCNC: 9 MMOL/L (ref 7–15)
AST SERPL W P-5'-P-CCNC: 28 U/L (ref 0–45)
BILIRUB SERPL-MCNC: 0.2 MG/DL
BUN SERPL-MCNC: 10.6 MG/DL (ref 8–23)
CALCIUM SERPL-MCNC: 8.7 MG/DL (ref 8.8–10.4)
CHLORIDE SERPL-SCNC: 101 MMOL/L (ref 98–107)
CREAT SERPL-MCNC: 1.19 MG/DL (ref 0.67–1.17)
CRP SERPL-MCNC: <3 MG/L
EGFRCR SERPLBLD CKD-EPI 2021: 64 ML/MIN/1.73M2
ERYTHROCYTE [DISTWIDTH] IN BLOOD BY AUTOMATED COUNT: 16.7 % (ref 10–15)
FERRITIN SERPL-MCNC: 40 NG/ML (ref 31–409)
GLUCOSE SERPL-MCNC: 85 MG/DL (ref 70–99)
HCO3 SERPL-SCNC: 24 MMOL/L (ref 22–29)
HCT VFR BLD AUTO: 26.7 % (ref 40–53)
HGB BLD-MCNC: 8.9 G/DL (ref 13.3–17.7)
INR HOME MONITORING: 2.8 RATIO (ref 2–3)
INR PPP: 2.59 (ref 0.85–1.15)
IRON BINDING CAPACITY (ROCHE): 236 UG/DL (ref 240–430)
IRON SATN MFR SERPL: 13 % (ref 15–46)
IRON SERPL-MCNC: 30 UG/DL (ref 61–157)
LDH SERPL L TO P-CCNC: 194 U/L (ref 0–250)
MCH RBC QN AUTO: 31.2 PG (ref 26.5–33)
MCHC RBC AUTO-ENTMCNC: 33.3 G/DL (ref 31.5–36.5)
MCV RBC AUTO: 94 FL (ref 78–100)
NT-PROBNP SERPL-MCNC: 1397 PG/ML (ref 0–852)
PLATELET # BLD AUTO: 208 10E3/UL (ref 150–450)
POTASSIUM SERPL-SCNC: 4.2 MMOL/L (ref 3.4–5.3)
PROT SERPL-MCNC: 6.5 G/DL (ref 6.4–8.3)
PROTHROMBIN TIME: 27.5 SECONDS (ref 11.8–14.8)
RBC # BLD AUTO: 2.85 10E6/UL (ref 4.4–5.9)
SODIUM SERPL-SCNC: 134 MMOL/L (ref 135–145)
WBC # BLD AUTO: 5.1 10E3/UL (ref 4–11)

## 2025-07-02 PROCEDURE — 83615 LACTATE (LD) (LDH) ENZYME: CPT | Performed by: PATHOLOGY

## 2025-07-02 PROCEDURE — 86140 C-REACTIVE PROTEIN: CPT | Performed by: PATHOLOGY

## 2025-07-02 PROCEDURE — 82728 ASSAY OF FERRITIN: CPT | Performed by: PATHOLOGY

## 2025-07-02 PROCEDURE — 84238 ASSAY NONENDOCRINE RECEPTOR: CPT | Mod: 90 | Performed by: PATHOLOGY

## 2025-07-02 PROCEDURE — 87075 CULTR BACTERIA EXCEPT BLOOD: CPT | Performed by: PHYSICIAN ASSISTANT

## 2025-07-02 PROCEDURE — 83540 ASSAY OF IRON: CPT | Performed by: PATHOLOGY

## 2025-07-02 PROCEDURE — 80053 COMPREHEN METABOLIC PANEL: CPT | Performed by: PATHOLOGY

## 2025-07-02 PROCEDURE — 93750 INTERROGATION VAD IN PERSON: CPT | Performed by: PHYSICIAN ASSISTANT

## 2025-07-02 PROCEDURE — 83550 IRON BINDING TEST: CPT | Performed by: PATHOLOGY

## 2025-07-02 PROCEDURE — 87205 SMEAR GRAM STAIN: CPT | Performed by: PHYSICIAN ASSISTANT

## 2025-07-02 PROCEDURE — 83880 ASSAY OF NATRIURETIC PEPTIDE: CPT | Performed by: PATHOLOGY

## 2025-07-02 PROCEDURE — G0463 HOSPITAL OUTPT CLINIC VISIT: HCPCS | Performed by: PHYSICIAN ASSISTANT

## 2025-07-02 PROCEDURE — 85610 PROTHROMBIN TIME: CPT | Performed by: PATHOLOGY

## 2025-07-02 PROCEDURE — 36415 COLL VENOUS BLD VENIPUNCTURE: CPT | Performed by: PATHOLOGY

## 2025-07-02 PROCEDURE — 99000 SPECIMEN HANDLING OFFICE-LAB: CPT | Performed by: PATHOLOGY

## 2025-07-02 PROCEDURE — 85027 COMPLETE CBC AUTOMATED: CPT | Performed by: PATHOLOGY

## 2025-07-02 RX ORDER — DOXYCYCLINE 100 MG/1
100 CAPSULE ORAL 2 TIMES DAILY
Qty: 28 CAPSULE | Refills: 0 | Status: SHIPPED | OUTPATIENT
Start: 2025-07-02

## 2025-07-02 RX ORDER — DIGOXIN 125 MCG
62.5 TABLET ORAL DAILY
Qty: 45 TABLET | Refills: 3 | Status: SHIPPED | OUTPATIENT
Start: 2025-07-02

## 2025-07-02 RX ORDER — DIGOXIN 0.06 MG/1
62.5 TABLET ORAL DAILY
Qty: 90 TABLET | Refills: 0 | Status: SHIPPED | OUTPATIENT
Start: 2025-07-02 | End: 2025-07-02 | Stop reason: DRUGHIGH

## 2025-07-02 ASSESSMENT — PAIN SCALES - GENERAL: PAINLEVEL_OUTOF10: NO PAIN (0)

## 2025-07-02 NOTE — NURSING NOTE
MCS VAD Pump Info       Row Name 07/02/25 1100             MCS VAD Information    Implant --      LVAD Pump --         Heartmate 3 LEFT VS    Flow (Lpm) 3.3 Lpm      Pulse Index (PI) 4.3 PI      Speed (rpm) 5000 rpm      Power (jackson) 3.1 jackson      Current Hct setting 26.7         Primary Controller    Serial number [id number]      Low flow alarm setting --      High watt alarm setting --      EBB: Patient use 12      Replace in 23 Months         Backup Controller    Serial number [id number]      EBB: Patient use 8      Replace EBB in 23 Months      Speed & HCT match primary controller --         VAD Interrogation    Alarms reported by patient N      Unexpected alarms noted upon interrogation None      PI events Frequent  PI 1.8-8.1, occasional speed drops, hx 11hrs      Damage to equipment is noted N      Action taken Reviewed proper equipment care and maintenance         Driveline Exit Site    Dressing change done Y      Driveline properly secured No (patient re-educated)  loose, falling off, secured with tape.      DLES assessment drainage      Dressing used Weekly kit  instructed pt to change dressing every day. refer to ID for infection      Frequency patient changes dressing Weekly      Dressing modifications --      Dressing change supplier --                      Education Complete: Yes   Charge the BACKUP controller s backup battery every 6 months  Perform a self test on BACKUP every 6 months  Change the MPU s batteries every 6 months:Yes  Have equipment serviced yearly (if applicable):Yes

## 2025-07-02 NOTE — LETTER
"7/2/2025      RE: Duane C Johnson  [id number] [address] [place] [id number]       Dear Colleague,    Thank you for the opportunity to participate in the care of your patient, Duane C Johnson, at the [facility]. Please see a copy of my visit note below.    HPI:   Mr. Aguila is a 75 year old male with a past medical history including CAD with history of anterior STEMI s/p PCI to the pLAD on 10/26/23 with a VT/VF arrest the next day (10/27), multiple subsequent admissions for HF exacerbation, with HFrEF 2/2 ICM s/p HM3 LVAD c/b frequent low atilio alarms s/p low-flow controller h/o CAD with history of STEMI, history of VT/VF arrest s/p ICD, history of a-fib flutter s/p cardioversion. Presents to clinic for LVAD follow-up.    He last saw Dr. Cruz 3/24/25- he was struggling with more lightheadedness at that point.    Today  He is having drainage from his driveline site. That has been going on for about one month. They sent texts of pictures, but they sent it to a landline, so they never heard back. No pain in the abdomen (he has hadd \"twinges when he moves certain ways, that is unchanged since implant).    He can walk 6688-1323 feet before he would need to sit now. No LE edema or abdominal edema.The lightheadedness has been improved- he notes that he used to have to sit down on the ground because of the lightheadedness and it has been over a month since he had to do that. He does sometimes have to sit down. No chest pain. Appetite is good- snacks a lot.     Weight is about 173 at home- he has gained some weight. At home without equipment is 165-170.     He is having some blood in the stool- that has been ongoing since the lvad implant first hospital stay. Then for a while it was spotting. He has seen the \"colon people\" twice and he has an appointment on Monday for treatment. He is on citricil. No nosebleeds. No blood in the urine.    No " headaches. He does have a history of migraine with visual aura- has a had a fiew times with loss of half vision. 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 No stroke symptoms.     No more low flow alarms.    Cardiac Medications   Coumdaine  Amldipien 5 mg daily  Atorvastatin 80 mg daily  Digoxin 62.5 mcg daily  Hydalazine 25 mg BID  Amiodarone 200 mg daily  Magnesium oxide 400 mg day      PAST MEDICAL HISTORY:  Past Medical History:   Diagnosis Date     Benign essential hypertension 2016     CAD (coronary artery disease)      Chronic systolic heart failure (H)      Hypertension      ST elevation myocardial infarction (STEMI), unspecified artery (H) 10/26/2023     Ventricular fibrillation (H)        FAMILY HISTORY:  Family History   Problem Relation Age of Onset     Other Cancer Mother      Obesity Mother      GI problems Father      Uterine Cancer Sister        SOCIAL HISTORY:  Social History     Socioeconomic History     Marital status:    Tobacco Use     Smoking status: Former     Current packs/day: 0.00     Average packs/day: 0.3 packs/day for 30.0 years (7.5 ttl pk-yrs)     Types: Cigarettes     Start date: 10/26/1993     Quit date: 10/26/2023     Years since quittin.6     Passive exposure: Past     Smokeless tobacco: Never     Tobacco comments:     Quit 10/26/2023   Vaping Use     Vaping status: Never Used   Substance and Sexual Activity     Alcohol use: Yes     Comment: 1-2 beers per day     Drug use: No     Sexual activity: Yes     Partners: Female     Birth control/protection: None   Other Topics Concern     Parent/sibling w/ CABG, MI or angioplasty before 65F 55M? No     Social Drivers of Health     Financial Resource Strain: Low Risk  (2025)    Financial Resource Strain      Within the past 12 months, have you or your family members you live with been unable to get utilities (heat, electricity) when it was really needed?: No   Food Insecurity: Low  Risk  (2/5/2025)    Food Insecurity      Within the past 12 months, did you worry that your food would run out before you got money to buy more?: No      Within the past 12 months, did the food you bought just not last and you didn t have money to get more?: No   Transportation Needs: Low Risk  (2/5/2025)    Transportation Needs      Within the past 12 months, has lack of transportation kept you from medical appointments, getting your medicines, non-medical meetings or appointments, work, or from getting things that you need?: No   Physical Activity: Inactive (11/16/2023)    Exercise Vital Sign      Days of Exercise per Week: 0 days      Minutes of Exercise per Session: 0 min   Social Connections: Unknown (11/16/2023)    Social Connection and Isolation Panel [NHANES]      Marital Status:    Interpersonal Safety: Low Risk  (3/18/2025)    Interpersonal Safety      Do you feel physically and emotionally safe where you currently live?: Yes      Within the past 12 months, have you been hit, slapped, kicked or otherwise physically hurt by someone?: No      Within the past 12 months, have you been humiliated or emotionally abused in other ways by your partner or ex-partner?: No   Housing Stability: High Risk (2/5/2025)    Housing Stability      Do you have housing? : No      Are you worried about losing your housing?: No       CURRENT MEDICATIONS:  Current Outpatient Medications   Medication Sig Dispense Refill     acetaminophen (TYLENOL) 325 MG tablet Take 2 tablets (650 mg) by mouth every 4 hours as needed for other (For optimal non-opioid multimodal pain management to improve pain control.).       amiodarone (PACERONE) 200 MG tablet Take 1 tablet (200 mg) by mouth daily. 90 tablet 3     amLODIPine (NORVASC) 10 MG tablet Take 0.5 tablets (5 mg) by mouth daily. 45 tablet 3     atorvastatin (LIPITOR) 80 MG tablet Take 1 tablet (80 mg) by mouth every evening. 90 tablet 3     digoxin (LANOXIN) 125 MCG tablet Take 0.5  tablets (62.5 mcg) by mouth daily. 45 tablet 3     doxycycline hyclate (VIBRAMYCIN) 100 MG capsule Take 1 capsule (100 mg) by mouth 2 times daily. 28 capsule 0     escitalopram (LEXAPRO) 10 MG tablet Take 1 tablet (10 mg) by mouth or Feeding Tube daily. 90 tablet 3     gabapentin (NEURONTIN) 100 MG capsule Take 1 capsule (100 mg) by mouth or Feeding Tube at bedtime. 90 capsule 3     magnesium hydroxide (MILK OF MAGNESIA) 400 MG/5ML suspension Take 30 mLs by mouth daily as needed for constipation (Use if polyethylene glycol (Miralax) is not effective after 24 hours.). 354 mL 0     magnesium oxide (MAG-OX) 400 MG tablet Take 1 tablet (400 mg) by mouth daily. 90 tablet 3     melatonin 10 MG TABS tablet Take 1 tablet (10 mg) by mouth every evening.       multivitamin w/minerals (THERA-VIT-M) tablet Take 1 tablet by mouth daily. 90 tablet 3     warfarin ANTICOAGULANT (COUMADIN) 2.5 MG tablet Take 0.5 tablets (1.25 mg) by mouth daily.       No current facility-administered medications for this visit.       ROS:   See HPI    EXAM:  BP (!) 62/0 (BP Location: Left arm, Patient Position: Chair, Cuff Size: Adult Regular)   Pulse 90   Wt 79.2 kg (174 lb 9.6 oz)   SpO2 99%   BMI 28.18 kg/m      GENERAL: Appears comfortable, in no distress .  HEENT: Eye symmetrical and without discharge or icterus bilaterally.   NECK: Supple, JVD <6.   CV: Hum of LVAD, no adventitious sounds  RESPIRATORY: Respirations regular, even, and unlabored. Lungs CTA throughout.   GI: Soft and non distended. Non tender to palpitation. No palabable masses or fluctuance   EXTREMITIES: No peripheral edema. Non- pulsatile.   NEUROLOGIC: Alert and interacting appropriately  MUSCULOSKELETAL: No joint swelling or tenderness.   SKIN: No jaundice. No rashes or lesions. Driveline dressing with significant drainage and some ertyhemia, see picture added to media from 7/2/25.    Labs - as reviewed in clinic with patient today:  CBC RESULTS:  Lab Results   Component  Value Date    WBC 5.1 07/02/2025    WBC 12.2 (H) 05/26/2021    RBC 2.85 (L) 07/02/2025    RBC 3.60 (L) 05/26/2021    HGB 8.9 (L) 07/02/2025    HGB 12.0 (L) 05/26/2021    HCT 26.7 (L) 07/02/2025    HCT 34.8 (L) 05/26/2021    MCV 94 07/02/2025    MCV 97 05/26/2021    MCH 31.2 07/02/2025    MCH 33.3 (H) 05/26/2021    MCHC 33.3 07/02/2025    MCHC 34.5 05/26/2021    RDW 16.7 (H) 07/02/2025    RDW 13.2 05/26/2021     07/02/2025     05/26/2021       CMP RESULTS:  Lab Results   Component Value Date     (L) 07/02/2025     10/27/2023     05/26/2021    POTASSIUM 4.2 07/02/2025    POTASSIUM 3.8 10/23/2024    POTASSIUM 2.5 (LL) 09/23/2024    POTASSIUM 4.5 05/26/2021    CHLORIDE 101 07/02/2025    CHLORIDE 103 10/27/2023    CHLORIDE 105 05/26/2021    CO2 24 07/02/2025    CO2 27 10/27/2023    CO2 29 05/26/2021    ANIONGAP 9 07/02/2025    ANIONGAP 7 05/26/2021    GLC 85 07/02/2025     (H) 11/09/2024     (H) 05/26/2021    BUN 10.6 07/02/2025    BUN 41 (H) 05/26/2021    CR 1.19 (H) 07/02/2025    CR 0.80 05/26/2021    GFRESTIMATED 64 07/02/2025    GFRESTIMATED >60 10/23/2024    GFRESTIMATED 90 05/26/2021    GFRESTBLACK >90 05/26/2021    PRANAV 8.7 (L) 07/02/2025    PRANAV 8.3 (L) 05/26/2021    BILITOTAL 0.2 07/02/2025    BILITOTAL 0.5 03/20/2018    ALBUMIN 3.8 07/02/2025    ALBUMIN 3.7 03/20/2018    ALKPHOS 112 07/02/2025    ALKPHOS 82 03/20/2018    ALT 20 07/02/2025    ALT 22 03/20/2018    AST 28 07/02/2025    AST 20 03/20/2018        INR RESULTS:  Lab Results   Component Value Date    INR 2.8 07/02/2025    INR 2.59 (H) 07/02/2025    INR 1.15 (H) 05/26/2021       Lab Results   Component Value Date    MAG 2.1 03/28/2025     Lab Results   Component Value Date    NTBNPI 22,172 (H) 09/04/2024     Lab Results   Component Value Date    NTBNP 1,397 (H) 07/02/2025 6/27/25 RHC  Device: [employer] [place] D533 RESONATE HF ICD  Normal Device Function  Mode: DDDR  bpm  AP: 60%  : 1%  Presenting  EGM: AP-VS @ 60 bpm  Lead Trends Appear Stable  Estimated battery longevity to RRT = 12.5 years.   Atrial Arrhythmia: None  AF [place]: 0%  Anticoagulant: Warfarin  Ventricular Arrhythmia: None    3/18/25 ECHO  Interpretation Summary  HeartMate 3 LVAD at 5000 rpm.     LVEDD is 5.2 cm. Septum is midline.  Global right ventricular function is mildly to moderately reduced. Mild right  ventricular dilation is present.  Aortic valve opens with every cardiac cycle. No aortic regurgitation is  present.  The right ventricular systolic pressure is 22mmHg above the right atrial  pressure.  The inferior vena cava was normal in size with preserved respiratory  variability.  Small circumferential pericardial effusion is present without any hemodynamic  significance. Chamber compression is not present; there is no evidence for  tamponade.     This study was compared with the study from 10/23/2024, LVEDD is mildly  larger.    Assessment and Plan:   Mr. Aguila is a 75 year old male with a past medical history including CAD with history of anterior STEMI s/p PCI to the pLAD on 10/26/23 with a VT/VF arrest the next day (10/27), multiple subsequent admissions for HF exacerbation, with HFrEF 2/2 ICM s/p HM3 LVAD c/b frequent low atilio alarms s/p low-flow controller h/o CAD with history of STEMI, history of VT/VF arrest s/p ICD, history of a-fib flutter s/p cardioversion. Presents to clinic for LVAD follow-up.    He has multiple active complaints today including driveline drainage very concerning for infection, rectal bleeding, dental concerns- please see detailed plans below.    # Chronic systolic heart failure secondary to ICM s/p HM3 LVAD as DT on 9/18/24.  Stage C. NYHA Class III.  # Has a LOW FLOW controller- will not alarm until flow is 2.0 or less    Fluid status euvolemic- doing well with aggressive fluid and salt intake, not on a loop diuretic  ACEi/ARB N/a given frequent hypotension  Afterload- stop hydralazine 25 mg BID given  softer BP today. Continue amlodipine 5 mg daily  BB N/a given bp concerns as above  Aldosterone antagonist avoiding d/t frequent dehydration  SGLT2i: avoiding d/t frequent dehydration  SCD prophylaxis ICD  NSAID use: contraindicated  Sleep Apnea Evaluation: not discussed today  BP: MAP goal 65-85, 62 today, stopping hydralazine as above  LDH trends: 194, stable  Anticoagulation: warfarin INR goal change goal to 2.0-2.5 at least until his colon treatment on Monday (being treated for radiation proctitis) and he has proven hgb stability, today 2.6  Antiplatelet: N/A  Other: continue mag ox 400 mg daily    VAD interrogation July 2, 2025: VAD interrogation reviewed with VAD coordinator. Agree with findings. Frequent PI events, no power spikes, speed drops, or other findings suspicious of pump malfunction noted.     # Low flow alarms, resolved  # Labile MAPs   # Suspected orthostatic hypotension, resolved  Low flows seems releated to hypertension and some hypovolemia as well. Have improved with re-timing hydralazine and after increasing oral fluids. CTA was done on  10/17/24 with no outflow graft ostruction. The outflow graft does make an acute angle as it approaches the aorta. Dr. Smith has reviewed inflow and outflow positioning, nothing to do . Speed was dropped to 5000 in oct 2024 which helped as well as stopping losartan and sglt2i as above.   - Speed set at 5000- decreased speed in Oct 2024 helped low-flow alarms  - Off all medications with a diuretic effecy  - Other BP med management as above  - Previously stopped fludrocortisone 0.1 mg daily   - Has a LOW FLOW controller- will not alarm until flow is 2.0 or less  - He continues to push fluids, including gatorade, salty snacks, etc    # Driveline drainage ongoing for the last month. Tried to send pictures, but appears he wa sendign these to a landline  - Start doxycycline 100 mg BID- discuss sun precautions, avoiding within 2 hours of mag, multivitamin,  calcium, and dairy  - Change to daily dressings, no showering  - Culture send today  - CVTs consult for lack of seal, ?benefit of a stich if this doesn't heal with infection treatment. By the sounds of it, he never had a seal and has had a lot of driveline mobility putting him at significant infection risk  - Refer to ID  - Low threshold for CT, but no pain, tendernss or fluctuance so deferring today     # History of VT/VF arrest 2023 2/2 STEMI  # AFib s/p DCCV 9/2024 and again 9/30/2024   Has had multiple attempted cardioversions. Eventually planned for just rate control, so no further cardioversions were planned, but now back in sinus  - ICD 6/27/25 without any atrial fibrillation since the last icd check, continue checks per protocol   - Continue amiodarone 200 mg daily  - Continue digoxin 62.5 mcg daily   - AC as above    # CAD w/h/o anterior STEMI s/p pLAD stent in 2023  - On coumadin, no asa given on coumadin  - BB deferred as above  - Continue atorvastatin 80 mg daily    # Dental concerns. Has no teeth. Needs dentures or alternative planning but has been told that he cannot have that until his abnormal bone growith along lower jaw is addressed. He has seen local dentists and oral surgeons but has been told his complexity is above their clinics ability  - Referred to [facility] surgery at [facility]    # Rectal bleeding  # Acute blood loss anemia  He is having rectal bleeding in the last few weeks- he has seen GI or colorectal surgery twice- they have a treatment plan and plan to do treatment for this on Monday. Has been told this is due to his radiation proctitis, and this has happened before as well  - Hgb has dropped in the last 2 months, 10.3->8.9, recheck hgb Wednesday, I expect this to improve after treatment but may need IV iron  - Adding iron studies today  - Discussed reasons to come to the ER  - Follow-up with longitudinal team Monday for treatment as scheduled    # Cognitive changes  - Needs formal driving  assessment with DMV or OT prior to being cleared to drive, wife aware and also discussed with patient at time of discharge- we discussed this again today. Would defer for at least another 3 months with ongoing medical issues  - Repeat neuropsych testing in 1 year post discharge from index admission  - Referred to behavioral neurology in place    # Inguinal hernia, can self-reduce. No pain, but irritating and uncomfortable  - Will discuss gen surg referal with his primary cardiologist    Follow up   - Hgb on Wednesday, (rectal bleeding, treatment Monday as planned)    - ID referral  - RTC in 2 months.     - I spent 7 minutes reviewing prior records and results on the day of service preparing for the appointment, 47 minutes face to face with the patient (11:00-> 11:47, not on the computer for the full appointment) and an additional 30 minutes coordinating care and completing documentation on the day of service    The longitudinal plan of care for the diagnosis(es)/condition(s) as documented were addressed during this visit. Due to the added complexity in care, I will continue to support Duane in the subsequent management and with ongoing continuity of care.        Torrie Mercado PA-C  Advanced Heart Failure          CC  TORRIE MERCADO      Please do not hesitate to contact me if you have any questions/concerns.     Sincerely,     Torrie Mercado PA-C

## 2025-07-02 NOTE — PROGRESS NOTES
ANTICOAGULATION MANAGEMENT     Duane C Johnson 75 year old male is on warfarin with therapeutic INR result. (Goal INR 2.0-3.0)    Recent labs: (last 7 days)     07/02/25  1119   INR 2.8     2.59 INR venous  ASSESSMENT     Source(s): Chart Review and Patient/Caregiver Call     Warfarin doses taken: Warfarin taken as instructed  Diet: No new diet changes identified  Medication/supplement changes: None noted  New illness, injury, or hospitalization: Hx: Pt saw colon/rectal surgery on 6/18/25 after having rectal bleeding again. Formalin was applied. A recurrence of rectal bleeding appeared. Pt scheduled for formalin again on 7/7/25. Cardiology team wants INR goal range to be 2.0-2.5 for the procedure. Will reassess goal range again after that.  Signs or symptoms of bleeding or clotting: Yes: see above  Previous result: Therapeutic last 2(+) visits  Additional findings: Upcoming surgery/procedure formalin application on 7/7/25. Pt will check labs and INR on 7/7/25 prior to procedure.  [facility] reviewed and advisement given to pt's wife Melissa.  Pt already took 2.5 mg today 7/2/25. [facility] recommended 1.25 mg today. Recommended that pt take 1/2 tablet (1.25 mg) for the next 4 days until procedure.       PLAN     Recommended plan for temporary change(s) affecting INR     Dosing Instructions: partial hold then decrease your warfarin dose (9.1% change) with next INR in 4 days       Summary  As of 7/2/2025      Full warfarin instructions:  7/4: 1.25 mg; Otherwise 2.5 mg every Mon, Wed, Fri; 1.25 mg all other days   Next INR check:  7/7/2025               Telephone call with Melissa (wife) who agrees to plan and repeated back plan correctly  Sent iTwin message with dosing and follow up instructions    Lab visit scheduled    Education provided: Contact [phone] with any changes, questions or concerns.     Plan made with [facility] Pharmacist Pratima Gabriel and per LVAD protocol    Paulina Martinez, RN  7/2/2025  Anticoagulation Clinic  Epic  pool for routing messages: p ANTICOAG LVAD  ACC patient phone line: [phone]        _______________________________________________________________________     Anticoagulation Episode Summary       Current INR goal:  2.0-3.0   TTR:  73.9% (7.5 mo)   Target end date:  Indefinite   Send INR reminders to:  ANTICOAG LVAD    Indications    Chronic systolic congestive heart failure (H) [I50.22]  Anticoagulated on warfarin [Z79.01]  LVAD (left ventricular assist device) present (H) [Z95.811]  Other specified hypotension [I95.89]  Chronic combined systolic and diastolic heart failure (H) [I50.42]             Comments:  Follow VAD Anticoag protocol:Yes: HeartMate 3  Bridging: Enoxaparin  Date VAD placed: 9/18/24             Anticoagulation Care Providers       Provider Role Specialty Phone number    Ham Cruz MD Referring Advanced Heart Failure and Transplant Cardiology [phone]    Sravanthi Asencio MD Referring Cardiovascular Disease [phone]

## 2025-07-02 NOTE — NURSING NOTE
Chief Complaint   Patient presents with    Follow Up     RETURN VAD         Vitals were taken, medications reconciled.    Gema Squires, EMT    10:51 AM

## 2025-07-02 NOTE — PROGRESS NOTES
"HPI:   Mr. Aguila is a 75 year old male with a past medical history including CAD with history of anterior STEMI s/p PCI to the pLAD on 10/26/23 with a VT/VF arrest the next day (10/27), multiple subsequent admissions for HF exacerbation, with HFrEF 2/2 ICM s/p HM3 LVAD c/b frequent low atilio alarms s/p low-flow controller h/o CAD with history of STEMI, history of VT/VF arrest s/p ICD, history of a-fib flutter s/p cardioversion. Presents to clinic for LVAD follow-up.    He last saw Dr. Cruz 3/24/25- he was struggling with more lightheadedness at that point.    Today  He is having drainage from his driveline site. That has been going on for about one month. They sent texts of pictures, but they sent it to a landline, so they never heard back. No pain in the abdomen (he has hadd \"twinges when he moves certain ways, that is unchanged since implant).    He can walk [phone] feet before he would need to sit now. No LE edema or abdominal edema.The lightheadedness has been improved- he notes that he used to have to sit down on the ground because of the lightheadedness and it has been over a month since he had to do that. He does sometimes have to sit down. No chest pain. Appetite is good- snacks a lot.     Weight is about 173 at home- he has gained some weight. At home without equipment is 165-170.     He is having some blood in the stool- that has been ongoing since the lvad implant first hospital stay. Then for a while it was spotting. He has seen the \"colon people\" twice and he has an appointment on Monday for treatment. He is on citricil. No nosebleeds. No blood in the urine.    No headaches. He does have a history of migraine with visual aura- has a had a fiew times with loss of half vision. 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 No stroke symptoms.     No more low flow alarms.    Cardiac Medications   Coumdaine  Amldipien 5 mg daily  Atorvastatin 80 mg daily  Digoxin 62.5 mcg " daily  Hydalazine 25 mg BID  Amiodarone 200 mg daily  Magnesium oxide 400 mg day      PAST MEDICAL HISTORY:  Past Medical History:   Diagnosis Date    Benign essential hypertension 2016    CAD (coronary artery disease)     Chronic systolic heart failure (H)     Hypertension     ST elevation myocardial infarction (STEMI), unspecified artery (H) 10/26/2023    Ventricular fibrillation (H)        FAMILY HISTORY:  Family History   Problem Relation Age of Onset    Other Cancer Mother     Obesity Mother     GI problems Father     Uterine Cancer Sister        SOCIAL HISTORY:  Social History     Socioeconomic History    Marital status:    Tobacco Use    Smoking status: Former     Current packs/day: 0.00     Average packs/day: 0.3 packs/day for 30.0 years (7.5 ttl pk-yrs)     Types: Cigarettes     Start date: 10/26/1993     Quit date: 10/26/2023     Years since quittin.6     Passive exposure: Past    Smokeless tobacco: Never    Tobacco comments:     Quit 10/26/2023   Vaping Use    Vaping status: Never Used   Substance and Sexual Activity    Alcohol use: Yes     Comment: 1-2 beers per day    Drug use: No    Sexual activity: Yes     Partners: Female     Birth control/protection: None   Other Topics Concern    Parent/sibling w/ CABG, MI or angioplasty before 65F 55M? No     Social Drivers of Health     Financial Resource Strain: Low Risk  (2025)    Financial Resource Strain     Within the past 12 months, have you or your family members you live with been unable to get utilities (heat, electricity) when it was really needed?: No   Food Insecurity: Low Risk  (2025)    Food Insecurity     Within the past 12 months, did you worry that your food would run out before you got money to buy more?: No     Within the past 12 months, did the food you bought just not last and you didn t have money to get more?: No   Transportation Needs: Low Risk  (2025)    Transportation Needs     Within the past 12 months, has  lack of transportation kept you from medical appointments, getting your medicines, non-medical meetings or appointments, work, or from getting things that you need?: No   Physical Activity: Inactive (11/16/2023)    Exercise Vital Sign     Days of Exercise per Week: 0 days     Minutes of Exercise per Session: 0 min   Social Connections: Unknown (11/16/2023)    Social Connection and Isolation Panel [NHANES]     Marital Status:    Interpersonal Safety: Low Risk  (3/18/2025)    Interpersonal Safety     Do you feel physically and emotionally safe where you currently live?: Yes     Within the past 12 months, have you been hit, slapped, kicked or otherwise physically hurt by someone?: No     Within the past 12 months, have you been humiliated or emotionally abused in other ways by your partner or ex-partner?: No   Housing Stability: High Risk (2/5/2025)    Housing Stability     Do you have housing? : No     Are you worried about losing your housing?: No       CURRENT MEDICATIONS:  Current Outpatient Medications   Medication Sig Dispense Refill    acetaminophen (TYLENOL) 325 MG tablet Take 2 tablets (650 mg) by mouth every 4 hours as needed for other (For optimal non-opioid multimodal pain management to improve pain control.).      amiodarone (PACERONE) 200 MG tablet Take 1 tablet (200 mg) by mouth daily. 90 tablet 3    amLODIPine (NORVASC) 10 MG tablet Take 0.5 tablets (5 mg) by mouth daily. 45 tablet 3    atorvastatin (LIPITOR) 80 MG tablet Take 1 tablet (80 mg) by mouth every evening. 90 tablet 3    digoxin (LANOXIN) 125 MCG tablet Take 0.5 tablets (62.5 mcg) by mouth daily. 45 tablet 3    doxycycline hyclate (VIBRAMYCIN) 100 MG capsule Take 1 capsule (100 mg) by mouth 2 times daily. 28 capsule 0    escitalopram (LEXAPRO) 10 MG tablet Take 1 tablet (10 mg) by mouth or Feeding Tube daily. 90 tablet 3    gabapentin (NEURONTIN) 100 MG capsule Take 1 capsule (100 mg) by mouth or Feeding Tube at bedtime. 90 capsule 3     magnesium hydroxide (MILK OF MAGNESIA) 400 MG/5ML suspension Take 30 mLs by mouth daily as needed for constipation (Use if polyethylene glycol (Miralax) is not effective after 24 hours.). 354 mL 0    magnesium oxide (MAG-OX) 400 MG tablet Take 1 tablet (400 mg) by mouth daily. 90 tablet 3    melatonin 10 MG TABS tablet Take 1 tablet (10 mg) by mouth every evening.      multivitamin w/minerals (THERA-VIT-M) tablet Take 1 tablet by mouth daily. 90 tablet 3    warfarin ANTICOAGULANT (COUMADIN) 2.5 MG tablet Take 0.5 tablets (1.25 mg) by mouth daily.       No current facility-administered medications for this visit.       ROS:   See HPI    EXAM:  BP (!) 62/0 (BP Location: Left arm, Patient Position: Chair, Cuff Size: Adult Regular)   Pulse 90   Wt 79.2 kg (174 lb 9.6 oz)   SpO2 99%   BMI 28.18 kg/m      GENERAL: Appears comfortable, in no distress .  HEENT: Eye symmetrical and without discharge or icterus bilaterally.   NECK: Supple, JVD <6.   CV: Hum of LVAD, no adventitious sounds  RESPIRATORY: Respirations regular, even, and unlabored. Lungs CTA throughout.   GI: Soft and non distended. Non tender to palpitation. No palabable masses or fluctuance   EXTREMITIES: No peripheral edema. Non- pulsatile.   NEUROLOGIC: Alert and interacting appropriately  MUSCULOSKELETAL: No joint swelling or tenderness.   SKIN: No jaundice. No rashes or lesions. Driveline dressing with significant drainage and some ertyhemia, see picture added to media from 7/2/25.    Labs - as reviewed in clinic with patient today:  CBC RESULTS:  Lab Results   Component Value Date    WBC 5.1 07/02/2025    WBC 12.2 (H) 05/26/2021    RBC 2.85 (L) 07/02/2025    RBC 3.60 (L) 05/26/2021    HGB 8.9 (L) 07/02/2025    HGB 12.0 (L) 05/26/2021    HCT 26.7 (L) 07/02/2025    HCT 34.8 (L) 05/26/2021    MCV 94 07/02/2025    MCV 97 05/26/2021    MCH 31.2 07/02/2025    MCH 33.3 (H) 05/26/2021    MCHC 33.3 07/02/2025    MCHC 34.5 05/26/2021    RDW 16.7 (H)  07/02/2025    RDW 13.2 05/26/2021     07/02/2025     05/26/2021       CMP RESULTS:  Lab Results   Component Value Date     (L) 07/02/2025     10/27/2023     05/26/2021    POTASSIUM 4.2 07/02/2025    POTASSIUM 3.8 10/23/2024    POTASSIUM 2.5 (LL) 09/23/2024    POTASSIUM 4.5 05/26/2021    CHLORIDE 101 07/02/2025    CHLORIDE 103 10/27/2023    CHLORIDE 105 05/26/2021    CO2 24 07/02/2025    CO2 27 10/27/2023    CO2 29 05/26/2021    ANIONGAP 9 07/02/2025    ANIONGAP 7 05/26/2021    GLC 85 07/02/2025     (H) 11/09/2024     (H) 05/26/2021    BUN 10.6 07/02/2025    BUN 41 (H) 05/26/2021    CR 1.19 (H) 07/02/2025    CR 0.80 05/26/2021    GFRESTIMATED 64 07/02/2025    GFRESTIMATED >60 10/23/2024    GFRESTIMATED 90 05/26/2021    GFRESTBLACK >90 05/26/2021    PRANAV 8.7 (L) 07/02/2025    PRANAV 8.3 (L) 05/26/2021    BILITOTAL 0.2 07/02/2025    BILITOTAL 0.5 03/20/2018    ALBUMIN 3.8 07/02/2025    ALBUMIN 3.7 03/20/2018    ALKPHOS 112 07/02/2025    ALKPHOS 82 03/20/2018    ALT 20 07/02/2025    ALT 22 03/20/2018    AST 28 07/02/2025    AST 20 03/20/2018        INR RESULTS:  Lab Results   Component Value Date    INR 2.8 07/02/2025    INR 2.59 (H) 07/02/2025    INR 1.15 (H) 05/26/2021       Lab Results   Component Value Date    MAG 2.1 03/28/2025     Lab Results   Component Value Date    NTBNPI 22,172 (H) 09/04/2024     Lab Results   Component Value Date    NTBNP 1,397 (H) 07/02/2025 6/27/25 [facility]  Device: [employer] D533 RESONATE HF ICD  Normal Device Function  Mode: DDDR  bpm  AP: 60%  : 1%  Presenting EGM: AP-VS @ 60 bpm  Lead Trends Appear Stable  Estimated battery longevity to RRT = 12.5 years.   Atrial Arrhythmia: None  AF Williams: 0%  Anticoagulant: Warfarin  Ventricular Arrhythmia: None    3/18/25 ECHO  Interpretation Summary  HeartMate 3 LVAD at 5000 rpm.     LVEDD is 5.2 cm. Septum is midline.  Global right ventricular function is mildly to moderately reduced. Mild  right  ventricular dilation is present.  Aortic valve opens with every cardiac cycle. No aortic regurgitation is  present.  The right ventricular systolic pressure is 22mmHg above the right atrial  pressure.  The inferior vena cava was normal in size with preserved respiratory  variability.  Small circumferential pericardial effusion is present without any hemodynamic  significance. Chamber compression is not present; there is no evidence for  tamponade.     This study was compared with the study from 10/23/2024, LVEDD is mildly  larger.    Assessment and Plan:   Mr. Aguila is a 75 year old male with a past medical history including CAD with history of anterior STEMI s/p PCI to the pLAD on 10/26/23 with a VT/VF arrest the next day (10/27), multiple subsequent admissions for HF exacerbation, with HFrEF 2/2 ICM s/p HM3 LVAD c/b frequent low atilio alarms s/p low-flow controller h/o CAD with history of STEMI, history of VT/VF arrest s/p ICD, history of a-fib flutter s/p cardioversion. Presents to clinic for LVAD follow-up.    He has multiple active complaints today including driveline drainage very concerning for infection, rectal bleeding, dental concerns- please see detailed plans below.    # Chronic systolic heart failure secondary to ICM s/p HM3 LVAD as DT on 9/18/24.  Stage C. NYHA Class III.  # Has a LOW FLOW controller- will not alarm until flow is 2.0 or less    Fluid status euvolemic- doing well with aggressive fluid and salt intake, not on a loop diuretic  ACEi/ARB N/a given frequent hypotension  Afterload- stop hydralazine 25 mg BID given softer BP today. Continue amlodipine 5 mg daily  BB N/a given bp concerns as above  Aldosterone antagonist avoiding d/t frequent dehydration  SGLT2i: avoiding d/t frequent dehydration  SCD prophylaxis ICD  NSAID use: contraindicated  Sleep Apnea Evaluation: not discussed today  BP: MAP goal 65-85, 62 today, stopping hydralazine as above  LDH trends: 194,  stable  Anticoagulation: warfarin INR goal change goal to 2.0-2.5 at least until his colon treatment on Monday (being treated for radiation proctitis) and he has proven hgb stability, today 2.6  Antiplatelet: N/A  Other: continue mag ox 400 mg daily    VAD interrogation July 2, 2025: VAD interrogation reviewed with VAD coordinator. Agree with findings. Frequent PI events, no power spikes, speed drops, or other findings suspicious of pump malfunction noted.     # Low flow alarms, resolved  # Labile MAPs   # Suspected orthostatic hypotension, resolved  Low flows seems releated to hypertension and some hypovolemia as well. Have improved with re-timing hydralazine and after increasing oral fluids. CTA was done on  10/17/24 with no outflow graft ostruction. The outflow graft does make an acute angle as it approaches the aorta. Dr. Smith has reviewed inflow and outflow positioning, nothing to do . Speed was dropped to 5000 in oct 2024 which helped as well as stopping losartan and sglt2i as above.   - Speed set at 5000- decreased speed in Oct 2024 helped low-flow alarms  - Off all medications with a diuretic effecy  - Other BP med management as above  - Previously stopped fludrocortisone 0.1 mg daily   - Has a LOW FLOW controller- will not alarm until flow is 2.0 or less  - He continues to push fluids, including gatorade, salty snacks, etc    # Driveline drainage ongoing for the last month. Tried to send pictures, but appears he wa sendign these to a landline  - Start doxycycline 100 mg BID- discuss sun precautions, avoiding within 2 hours of mag, multivitamin, calcium, and dairy  - Change to daily dressings, no showering  - Culture send today  - CVTs consult for lack of seal, ?benefit of a stich if this doesn't heal with infection treatment. By the sounds of it, he never had a seal and has had a lot of driveline mobility putting him at significant infection risk  - Refer to ID  - Low threshold for CT, but no pain,  tendernss or fluctuance so deferring today     # History of VT/VF arrest 2023 2/2 STEMI  # AFib s/p DCCV 9/2024 and again 9/30/2024   Has had multiple attempted cardioversions. Eventually planned for just rate control, so no further cardioversions were planned, but now back in sinus  - ICD 6/27/25 without any atrial fibrillation since the last icd check, continue checks per protocol   - Continue amiodarone 200 mg daily  - Continue digoxin 62.5 mcg daily   - AC as above    # CAD w/h/o anterior STEMI s/p pLAD stent in 2023  - On coumadin, no asa given on coumadin  - BB deferred as above  - Continue atorvastatin 80 mg daily    # Dental concerns. Has no teeth. Needs dentures or alternative planning but has been told that he cannot have that until his abnormal bone growith along lower jaw is addressed. He has seen local dentists and oral surgeons but has been told his complexity is above their clinics ability  - Referred to oral surgery at [facility]    # Rectal bleeding  # Acute blood loss anemia  He is having rectal bleeding in the last few weeks- he has seen GI or colorectal surgery twice- they have a treatment plan and plan to do treatment for this on Monday. Has been told this is due to his radiation proctitis, and this has happened before as well  - Hgb has dropped in the last 2 months, 10.3->8.9, recheck hgb Wednesday, I expect this to improve after treatment but may need IV iron  - Adding iron studies today  - Discussed reasons to come to the ER  - Follow-up with longitudinal team Monday for treatment as scheduled    # Cognitive changes  - Needs formal driving assessment with DMV or OT prior to being cleared to drive, wife aware and also discussed with patient at time of discharge- we discussed this again today. Would defer for at least another 3 months with ongoing medical issues  - Repeat neuropsych testing in 1 year post discharge from index admission  - Referred to behavioral neurology in place    # Inguinal  hernia, can self-reduce. No pain, but irritating and uncomfortable  - Will discuss gen surg referal with his primary cardiologist    Follow up   - Hgb on Wednesday, (rectal bleeding, treatment Monday as planned)    - ID referral  - RTC in 2 months.     - I spent 7 minutes reviewing prior records and results on the day of service preparing for the appointment, 47 minutes face to face with the patient (11:00-> 11:47, not on the computer for the full appointment) and an additional 30 minutes coordinating care and completing documentation on the day of service    The longitudinal plan of care for the diagnosis(es)/condition(s) as documented were addressed during this visit. Due to the added complexity in care, I will continue to support Duane in the subsequent management and with ongoing continuity of care.        Torrie Mercado PA-C  Advanced Heart Failure          CC  TORRIE MERCADO S

## 2025-07-02 NOTE — PATIENT INSTRUCTIONS
Ventricular Assist Device Clinic  Take your medications every day, as directed!  Medication changes made today:  STOP taking Hydralazine  START Doxycycline 100mg twice per day for 14 days. This is for the suspected infection at your driveline site.   Take Doxycycline 2 hours apart from dairy products and any medications that contain calcium, magnesium, or iron (including multivitamins)   Avoid direct sun exposure or use sun protection to avoid sunburn while on doxycycline  Take with ample water and avoid laying down for 30 min after taking to avoid esophageal irritation     Instructions:  We are referring you to the infectious disease clinic for suspected infection at the driveline site. They will call you to schedule an appointment. Stay on antibiotics until you see one of their providers.   We are low INR goal to 2-2.5 due to rectal bleeding for a few days.   Get a hemoglobin drawn on Monday, 7/7  Change the driveline dressing every day for now.  Monitor your heart failure, Page the VAD Coordinator on call:  If you gain more than 3 lb in a day or 5 in a week  If you feel worsening shortness of breath, palpitations, or swelling.   If you have VAD alarms or change in parameters  If you feel dizzy or lightheaded     Keep your VAD appointments!    Your next VAD appointment is on 9/8 with Dr. Cruz.   You have an echocardiogram and right heart cath on 9/5.     We will get you scheduled on 9/8 to see one of the surgeons as well.     Please schedule an appointment with Anitha for 6 months from now.     See the clinic schedulers after your appointment to schedule follow-up.    If you do not have an appointment scheduled, you need to call the VAD  at [phone]. To Page the VAD Coordinator on call, dial [phone] option #4 and ask to speak to the VAD coordinator on call. Try to maintain a heart healthy lifestyle!  Limit salt & sodium to 2000mg/day   Eat a heart healthy diet, low in saturated fats  Stay active!  "Aim to move at least 150 minutes every week.    Use [employer] allows you to communicate directly with your heart team through secure messaging.  [employer] can be accessed any time on your phone, computer, or tablet.  If you need assistance, we'd be happy to help!      Equipment Reminders:   Charge your back-up controller at least every 6 months. To charge, connect it to either batteries or wall power. The screen will read \"Charging\". Keep connected until the screen reads \"charging complete\". Disconnect power once the controller battery is charged. Also do a self-test when the controller is connected to power.  Replace the AA batteries in your mobile power unit every 6 months.  Check your battery charger for when it is due for maintenance. It requires inspection in clinic once per year. There will be a sticker stating the month and year maintenance is due. When you bring your battery charger to clinic, tell one of the schedulers you have LVAD equipment that needs maintenance. They will call [employer]. You can leave your  under the LVAD sign by the  at the far end of clinic. You must drop it off before 2pm.         "

## 2025-07-03 ENCOUNTER — PATIENT OUTREACH (OUTPATIENT)
Dept: CARE COORDINATION | Facility: CLINIC | Age: 75
End: 2025-07-03
Payer: MEDICARE

## 2025-07-03 ENCOUNTER — CARE COORDINATION (OUTPATIENT)
Dept: CARDIOLOGY | Facility: CLINIC | Age: 75
End: 2025-07-03
Payer: MEDICARE

## 2025-07-03 DIAGNOSIS — D64.9 ANEMIA, UNSPECIFIED TYPE: Primary | ICD-10-CM

## 2025-07-03 DIAGNOSIS — I50.22 CHRONIC SYSTOLIC CONGESTIVE HEART FAILURE (H): ICD-10-CM

## 2025-07-03 LAB
BACTERIA WND CULT: ABNORMAL
BACTERIA WND CULT: ABNORMAL
BACTERIA WND CULT: NORMAL
GRAM STAIN RESULT: ABNORMAL
GRAM STAIN RESULT: ABNORMAL
STFR SERPL-MCNC: 3.6 MG/L

## 2025-07-03 RX ORDER — ALBUTEROL SULFATE 0.83 MG/ML
2.5 SOLUTION RESPIRATORY (INHALATION)
OUTPATIENT
Start: 2025-07-08

## 2025-07-03 RX ORDER — METHYLPREDNISOLONE SODIUM SUCCINATE 40 MG/ML
40 INJECTION INTRAMUSCULAR; INTRAVENOUS
Start: 2025-07-08

## 2025-07-03 RX ORDER — MEPERIDINE HYDROCHLORIDE 25 MG/ML
25 INJECTION INTRAMUSCULAR; INTRAVENOUS; SUBCUTANEOUS
OUTPATIENT
Start: 2025-07-08

## 2025-07-03 RX ORDER — EPINEPHRINE 1 MG/ML
0.3 INJECTION, SOLUTION, CONCENTRATE INTRAVENOUS EVERY 5 MIN PRN
OUTPATIENT
Start: 2025-07-08

## 2025-07-03 RX ORDER — DIPHENHYDRAMINE HYDROCHLORIDE 50 MG/ML
25 INJECTION, SOLUTION INTRAMUSCULAR; INTRAVENOUS
Start: 2025-07-08

## 2025-07-03 RX ORDER — ALBUTEROL SULFATE 90 UG/1
1-2 INHALANT RESPIRATORY (INHALATION)
Start: 2025-07-08

## 2025-07-03 RX ORDER — DIPHENHYDRAMINE HYDROCHLORIDE 50 MG/ML
50 INJECTION, SOLUTION INTRAMUSCULAR; INTRAVENOUS
Start: 2025-07-08

## 2025-07-03 NOTE — PROGRESS NOTES
I talked with Melissa about the need for iron labs. I told her when the infusions clinic calls, she or Duane can ask if the two injectafer infusions could be done [facility].    Melissa agreed to have Duane see ID about his driveline drainage. He has started on Doxycycline and will see ID within two weeks. I reinforced with Melissa that they should call the VAD coordinator on call with concerns such as driveline drainage, bleeding, dizzyness, etc.

## 2025-07-07 ENCOUNTER — DOCUMENTATION ONLY (OUTPATIENT)
Dept: ANTICOAGULATION | Facility: CLINIC | Age: 75
End: 2025-07-07

## 2025-07-07 ENCOUNTER — PATIENT OUTREACH (OUTPATIENT)
Dept: CARE COORDINATION | Facility: CLINIC | Age: 75
End: 2025-07-07

## 2025-07-07 ENCOUNTER — CARE COORDINATION (OUTPATIENT)
Dept: CARDIOLOGY | Facility: CLINIC | Age: 75
End: 2025-07-07

## 2025-07-07 ENCOUNTER — LAB (OUTPATIENT)
Dept: LAB | Facility: CLINIC | Age: 75
End: 2025-07-07
Payer: MEDICARE

## 2025-07-07 ENCOUNTER — ANTICOAGULATION THERAPY VISIT (OUTPATIENT)
Dept: ANTICOAGULATION | Facility: CLINIC | Age: 75
End: 2025-07-07

## 2025-07-07 DIAGNOSIS — I50.42 CHRONIC COMBINED SYSTOLIC AND DIASTOLIC HEART FAILURE (H): ICD-10-CM

## 2025-07-07 DIAGNOSIS — I50.22 CHRONIC SYSTOLIC CONGESTIVE HEART FAILURE (H): Primary | ICD-10-CM

## 2025-07-07 DIAGNOSIS — I50.42 CHRONIC COMBINED SYSTOLIC AND DIASTOLIC CONGESTIVE HEART FAILURE (H): ICD-10-CM

## 2025-07-07 DIAGNOSIS — Z95.811 LVAD (LEFT VENTRICULAR ASSIST DEVICE) PRESENT (H): ICD-10-CM

## 2025-07-07 DIAGNOSIS — I50.22 CHRONIC SYSTOLIC CONGESTIVE HEART FAILURE (H): ICD-10-CM

## 2025-07-07 DIAGNOSIS — I95.89 OTHER SPECIFIED HYPOTENSION: ICD-10-CM

## 2025-07-07 DIAGNOSIS — T82.7XXA INFECTION ASSOCIATED WITH DRIVELINE OF LEFT VENTRICULAR ASSIST DEVICE (LVAD): Primary | ICD-10-CM

## 2025-07-07 DIAGNOSIS — Z79.01 ANTICOAGULATED ON WARFARIN: ICD-10-CM

## 2025-07-07 LAB
HGB BLD-MCNC: 9.2 G/DL (ref 13.3–17.7)
INR (EXTERNAL): 1.68
INR PPP: 1.62 (ref 0.85–1.15)
MCV RBC AUTO: 92 FL (ref 78–100)
PROTHROMBIN TIME: 19.4 SECONDS (ref 11.8–14.8)

## 2025-07-07 PROCEDURE — 85018 HEMOGLOBIN: CPT | Performed by: PATHOLOGY

## 2025-07-07 PROCEDURE — 85610 PROTHROMBIN TIME: CPT | Performed by: PATHOLOGY

## 2025-07-07 PROCEDURE — 36415 COLL VENOUS BLD VENIPUNCTURE: CPT | Performed by: PATHOLOGY

## 2025-07-07 RX ORDER — SULFAMETHOXAZOLE AND TRIMETHOPRIM 800; 160 MG/1; MG/1
1 TABLET ORAL 2 TIMES DAILY
Qty: 28 TABLET | Refills: 0 | Status: SHIPPED | OUTPATIENT
Start: 2025-07-07

## 2025-07-07 NOTE — PROGRESS NOTES
"VAD team received result note from Anitha Forrest regarding the wound culture obtained from pts DLES last week:   \"Per the antibiogram, doxycycline has very little effect against corynebacterium striatum. Would stop doxycylcine and start 1 double strength bactrim tablet twice daily.\"  Called pt's wife to review updated abx plan. Instructed pt's wife that pt should stop doxy now, and start batcrim this evening. Wife verbalized understanding. Wife willl call ID clinic today to schedule an appt.   Prescription sent to pt's pharmacy. 2 week course sent.     "

## 2025-07-07 NOTE — PROGRESS NOTES
"ANTICOAGULATION  MANAGEMENT     Interacting Medication Review    Interacting medication(s): Sulfamethoxazole-Trimethoprim (Bactrim) with warfarin.    Duration: 14 days  (7/7 to 7/21/25)    Indication: Driveline exit site infection    New medication?: Yes, interaction may increase INR and risk of bleeding. Closer INR monitoring recommended. and With sulfamethoxazole-Trimethoprim, [facility] protocol Appendix G recommends empiric reduction of warfarin dose in therapeutic/supratherapeutic patients.        PLAN     Decrease your warfarin dose (10% change) with next INR in 4 days        New warfarin dose: 2.5mg Mon Thur and 1.25mg all other days.  Recommend next INR: 7/11/25    Telephone call with Melissa who verbalizes understanding and agrees to plan and who agrees to plan and repeated back plan correctly    Maintenance dose modified on anticoagulation calendar for ongoing interaction    ACC priority set/moved to \"critical\" for LVAD with new major drug interaction    Plan made with [facility] Pharmacist Sindy Walker RN  7/7/2025  Anticoagulation Clinic  [facility] for routing messages: p ANTICOAG LVAD  [facility] patient phone line: [phone]   "

## 2025-07-07 NOTE — PROGRESS NOTES
ANTICOAGULATION MANAGEMENT     Duane C Johnson 75 year old male is on warfarin with subtherapeutic INR result. (Goal INR 2.0-3.0)    Recent labs: (last 7 days)     07/07/25  1506   INR 1.62*       ASSESSMENT     Source(s): Chart Review and Patient/Caregiver Call     Warfarin doses taken: Warfarin taken as instructed and Warfarin dose was lowered on 7/2 because of rectal bleeding  Diet: No new diet changes identified  Medication/supplement changes: Patient is starting a 2 week course of Bactrim today  New illness, injury, or hospitalization: Patient was scheduled for rectal formalin application today.  Procedure was canceled because bleeding subsided  Signs or symptoms of bleeding or clotting: Yes: Patient was having rectal bleeding but this has stopped  Previous result: Therapeutic last 2(+) visits  Additional findings: None       PLAN     Recommended plan for ongoing change(s) affecting INR     Dosing Instructions: decrease your warfarin dose (10% change) with next INR in 4 days       Summary  As of 7/7/2025      Full warfarin instructions:  2.5 mg every Mon, Thu; 1.25 mg all other days   Next INR check:  7/11/2025               Telephone call with Melissa who verbalizes understanding and agrees to plan and who agrees to plan and repeated back plan correctly    Lab visit scheduled    Education provided: Taking warfarin: Importance of taking warfarin as instructed  Goal range and lab monitoring: Importance of following up at instructed interval  Interaction IS anticipated between warfarin and Bactrim  Symptom monitoring: monitoring for bleeding signs and symptoms    Plan made with [facility] Pharmacist Sindy Burnette and per LVAD protocol    Namrata Walker RN  7/7/2025  Anticoagulation Clinic  Sand Sign for routing messages: susannah MARTINEZ LVJAQUAN  [facility] patient phone line: [phone]        _______________________________________________________________________     Anticoagulation Episode Summary       Current INR goal:  2.0-3.0    TTR:  73.8% (7.7 mo)   Target end date:  Indefinite   Send INR reminders to:  ANTICOAG LVAD    Indications    Chronic systolic congestive heart failure (H) [I50.22]  Anticoagulated on warfarin [Z79.01]  LVAD (left ventricular assist device) present (H) [Z95.811]  Other specified hypotension [I95.89]  Chronic combined systolic and diastolic heart failure (H) [I50.42]             Comments:  Follow VAD Anticoag protocol:Yes: HeartMate 3  Bridging: Enoxaparin  Date VAD placed: 9/18/24             Anticoagulation Care Providers       Provider Role Specialty Phone number    Ham Cruz MD Referring Advanced Heart Failure and Transplant Cardiology [phone]    Sravanthi Asencio MD Referring Cardiovascular Disease [phone]

## 2025-07-08 NOTE — PROGRESS NOTES
Chart reviewed and plan made with [facility] RN at time of encounter.  Recent partial hold likely affecting INR.  Recommended partial warfarin maintenance dose reduction with delayed onset  in anticipation of bactrim interaction with delayed onset (10% vs. 15-25%) in attempt to avoid supratherapeutic INR with recent rectal bleeding; continue to monitor INR.     Sindy Burnette, [facility]

## 2025-07-09 ENCOUNTER — CARE COORDINATION (OUTPATIENT)
Dept: CARDIOLOGY | Facility: CLINIC | Age: 75
End: 2025-07-09
Payer: MEDICARE

## 2025-07-09 DIAGNOSIS — K40.90 INGUINAL HERNIA: Primary | ICD-10-CM

## 2025-07-09 LAB — BACTERIA WND CULT: NORMAL

## 2025-07-09 NOTE — PROGRESS NOTES
D:  Received request from VIDHI Welsh for pt to have referral to Gen Surgery for hernia repair.  Also request for pt to follow up w/ CVTS to evaluate need for stitch around pt's DLES.  I:  Gen Surgery referral placed.  Message sent to Dr. Mulvihill for CVTS consult.

## 2025-07-10 ENCOUNTER — PATIENT OUTREACH (OUTPATIENT)
Dept: CARE COORDINATION | Facility: CLINIC | Age: 75
End: 2025-07-10
Payer: MEDICARE

## 2025-07-10 LAB
BACTERIA WND CULT: ABNORMAL
GRAM STAIN RESULT: ABNORMAL
GRAM STAIN RESULT: ABNORMAL

## 2025-07-14 ENCOUNTER — CARE COORDINATION (OUTPATIENT)
Dept: CARDIOLOGY | Facility: CLINIC | Age: 75
End: 2025-07-14

## 2025-07-14 ENCOUNTER — APPOINTMENT (OUTPATIENT)
Dept: CT IMAGING | Facility: CLINIC | Age: 75
DRG: 315 | End: 2025-07-14
Attending: NURSE PRACTITIONER
Payer: MEDICARE

## 2025-07-14 ENCOUNTER — HOSPITAL ENCOUNTER (INPATIENT)
Facility: CLINIC | Age: 75
DRG: 315 | End: 2025-07-14
Attending: STUDENT IN AN ORGANIZED HEALTH CARE EDUCATION/TRAINING PROGRAM | Admitting: INTERNAL MEDICINE
Payer: MEDICARE

## 2025-07-14 ENCOUNTER — APPOINTMENT (OUTPATIENT)
Dept: GENERAL RADIOLOGY | Facility: CLINIC | Age: 75
DRG: 315 | End: 2025-07-14
Attending: STUDENT IN AN ORGANIZED HEALTH CARE EDUCATION/TRAINING PROGRAM
Payer: MEDICARE

## 2025-07-14 ENCOUNTER — ANTICOAGULATION THERAPY VISIT (OUTPATIENT)
Dept: ANTICOAGULATION | Facility: CLINIC | Age: 75
End: 2025-07-14

## 2025-07-14 DIAGNOSIS — Z95.811 LVAD (LEFT VENTRICULAR ASSIST DEVICE) PRESENT (H): ICD-10-CM

## 2025-07-14 DIAGNOSIS — D64.9 ANEMIA, UNSPECIFIED TYPE: ICD-10-CM

## 2025-07-14 DIAGNOSIS — I95.89 OTHER SPECIFIED HYPOTENSION: ICD-10-CM

## 2025-07-14 DIAGNOSIS — B94.8 SEQUELA OF CORYNEBACTERIUM INFECTION: ICD-10-CM

## 2025-07-14 DIAGNOSIS — R53.1 GENERALIZED WEAKNESS: ICD-10-CM

## 2025-07-14 DIAGNOSIS — I50.42 CHRONIC COMBINED SYSTOLIC AND DIASTOLIC HEART FAILURE (H): ICD-10-CM

## 2025-07-14 DIAGNOSIS — R53.1 WEAKNESS: ICD-10-CM

## 2025-07-14 DIAGNOSIS — R42 LIGHTHEADEDNESS: ICD-10-CM

## 2025-07-14 DIAGNOSIS — Z79.01 ANTICOAGULATED ON WARFARIN: ICD-10-CM

## 2025-07-14 DIAGNOSIS — K92.1 HEMATOCHEZIA: ICD-10-CM

## 2025-07-14 DIAGNOSIS — E87.1 HYPONATREMIA: ICD-10-CM

## 2025-07-14 DIAGNOSIS — K40.90 LEFT INGUINAL HERNIA: ICD-10-CM

## 2025-07-14 DIAGNOSIS — I50.22 CHRONIC SYSTOLIC CONGESTIVE HEART FAILURE (H): Primary | ICD-10-CM

## 2025-07-14 DIAGNOSIS — Z46.3 ENCOUNTER FOR FITTING OF DENTURES: Primary | ICD-10-CM

## 2025-07-14 LAB
ALBUMIN SERPL BCG-MCNC: 4 G/DL (ref 3.5–5.2)
ALBUMIN UR-MCNC: NEGATIVE MG/DL
ALP SERPL-CCNC: 109 U/L (ref 40–150)
ALT SERPL W P-5'-P-CCNC: 64 U/L (ref 0–70)
ANION GAP SERPL CALCULATED.3IONS-SCNC: 12 MMOL/L (ref 7–15)
APPEARANCE UR: CLEAR
AST SERPL W P-5'-P-CCNC: 79 U/L (ref 0–45)
BASOPHILS # BLD AUTO: 0 10E3/UL (ref 0–0.2)
BASOPHILS NFR BLD AUTO: 1 %
BILIRUB SERPL-MCNC: 0.3 MG/DL
BILIRUB UR QL STRIP: NEGATIVE
BUN SERPL-MCNC: 15.4 MG/DL (ref 8–23)
CALCIUM SERPL-MCNC: 9 MG/DL (ref 8.8–10.4)
CHLORIDE SERPL-SCNC: 95 MMOL/L (ref 98–107)
COLOR UR AUTO: ABNORMAL
CREAT SERPL-MCNC: 1.13 MG/DL (ref 0.67–1.17)
EGFRCR SERPLBLD CKD-EPI 2021: 68 ML/MIN/1.73M2
EOSINOPHIL # BLD AUTO: 0.1 10E3/UL (ref 0–0.7)
EOSINOPHIL NFR BLD AUTO: 1 %
ERYTHROCYTE [DISTWIDTH] IN BLOOD BY AUTOMATED COUNT: 15.5 % (ref 10–15)
GLUCOSE SERPL-MCNC: 108 MG/DL (ref 70–99)
GLUCOSE UR STRIP-MCNC: 50 MG/DL
HCO3 SERPL-SCNC: 21 MMOL/L (ref 22–29)
HCT VFR BLD AUTO: 27.3 % (ref 40–53)
HGB BLD-MCNC: 9 G/DL (ref 13.3–17.7)
HGB UR QL STRIP: NEGATIVE
IMM GRANULOCYTES # BLD: 0 10E3/UL
IMM GRANULOCYTES NFR BLD: 0 %
INR PPP: 1.52 (ref 0.85–1.15)
KETONES UR STRIP-MCNC: NEGATIVE MG/DL
LACTATE SERPL-SCNC: 0.8 MMOL/L (ref 0.7–2)
LEUKOCYTE ESTERASE UR QL STRIP: NEGATIVE
LYMPHOCYTES # BLD AUTO: 0.3 10E3/UL (ref 0.8–5.3)
LYMPHOCYTES NFR BLD AUTO: 7 %
MCH RBC QN AUTO: 29.8 PG (ref 26.5–33)
MCHC RBC AUTO-ENTMCNC: 33 G/DL (ref 31.5–36.5)
MCV RBC AUTO: 90 FL (ref 78–100)
MONOCYTES # BLD AUTO: 0.4 10E3/UL (ref 0–1.3)
MONOCYTES NFR BLD AUTO: 10 %
NEUTROPHILS # BLD AUTO: 3.4 10E3/UL (ref 1.6–8.3)
NEUTROPHILS NFR BLD AUTO: 81 %
NITRATE UR QL: NEGATIVE
NRBC # BLD AUTO: 0 10E3/UL
NRBC BLD AUTO-RTO: 0 /100
PH UR STRIP: 6.5 [PH] (ref 5–7)
PLATELET # BLD AUTO: 213 10E3/UL (ref 150–450)
POTASSIUM SERPL-SCNC: 4.7 MMOL/L (ref 3.4–5.3)
PROT SERPL-MCNC: 7.1 G/DL (ref 6.4–8.3)
PROTHROMBIN TIME: 18.5 SECONDS (ref 11.8–14.8)
RBC # BLD AUTO: 3.02 10E6/UL (ref 4.4–5.9)
RBC URINE: 4 /HPF
SODIUM SERPL-SCNC: 128 MMOL/L (ref 135–145)
SP GR UR STRIP: 1.03 (ref 1–1.03)
TROPONIN T SERPL HS-MCNC: 13 NG/L
TROPONIN T SERPL HS-MCNC: 9 NG/L
UROBILINOGEN UR STRIP-MCNC: NORMAL MG/DL
WBC # BLD AUTO: 4.2 10E3/UL (ref 4–11)
WBC URINE: 1 /HPF

## 2025-07-14 PROCEDURE — 99222 1ST HOSP IP/OBS MODERATE 55: CPT | Mod: FS | Performed by: PHYSICIAN ASSISTANT

## 2025-07-14 PROCEDURE — 99285 EMERGENCY DEPT VISIT HI MDM: CPT | Performed by: STUDENT IN AN ORGANIZED HEALTH CARE EDUCATION/TRAINING PROGRAM

## 2025-07-14 PROCEDURE — 36415 COLL VENOUS BLD VENIPUNCTURE: CPT | Performed by: STUDENT IN AN ORGANIZED HEALTH CARE EDUCATION/TRAINING PROGRAM

## 2025-07-14 PROCEDURE — 87075 CULTR BACTERIA EXCEPT BLOOD: CPT | Performed by: NURSE PRACTITIONER

## 2025-07-14 PROCEDURE — 250N000013 HC RX MED GY IP 250 OP 250 PS 637: Performed by: NURSE PRACTITIONER

## 2025-07-14 PROCEDURE — 71046 X-RAY EXAM CHEST 2 VIEWS: CPT | Mod: 26 | Performed by: RADIOLOGY

## 2025-07-14 PROCEDURE — 93005 ELECTROCARDIOGRAM TRACING: CPT | Performed by: STUDENT IN AN ORGANIZED HEALTH CARE EDUCATION/TRAINING PROGRAM

## 2025-07-14 PROCEDURE — 71046 X-RAY EXAM CHEST 2 VIEWS: CPT

## 2025-07-14 PROCEDURE — 85004 AUTOMATED DIFF WBC COUNT: CPT | Performed by: STUDENT IN AN ORGANIZED HEALTH CARE EDUCATION/TRAINING PROGRAM

## 2025-07-14 PROCEDURE — 250N000011 HC RX IP 250 OP 636: Performed by: NURSE PRACTITIONER

## 2025-07-14 PROCEDURE — 120N000005 HC R&B MS OVERFLOW UMMC

## 2025-07-14 PROCEDURE — 250N000013 HC RX MED GY IP 250 OP 250 PS 637: Performed by: INTERNAL MEDICINE

## 2025-07-14 PROCEDURE — 93750 INTERROGATION VAD IN PERSON: CPT | Performed by: NURSE PRACTITIONER

## 2025-07-14 PROCEDURE — 87205 SMEAR GRAM STAIN: CPT | Performed by: NURSE PRACTITIONER

## 2025-07-14 PROCEDURE — 84155 ASSAY OF PROTEIN SERUM: CPT | Performed by: STUDENT IN AN ORGANIZED HEALTH CARE EDUCATION/TRAINING PROGRAM

## 2025-07-14 PROCEDURE — 85610 PROTHROMBIN TIME: CPT | Performed by: STUDENT IN AN ORGANIZED HEALTH CARE EDUCATION/TRAINING PROGRAM

## 2025-07-14 PROCEDURE — 99222 1ST HOSP IP/OBS MODERATE 55: CPT | Mod: 25 | Performed by: INTERNAL MEDICINE

## 2025-07-14 PROCEDURE — 84484 ASSAY OF TROPONIN QUANT: CPT | Performed by: STUDENT IN AN ORGANIZED HEALTH CARE EDUCATION/TRAINING PROGRAM

## 2025-07-14 PROCEDURE — 81003 URINALYSIS AUTO W/O SCOPE: CPT | Performed by: STUDENT IN AN ORGANIZED HEALTH CARE EDUCATION/TRAINING PROGRAM

## 2025-07-14 PROCEDURE — 83605 ASSAY OF LACTIC ACID: CPT | Performed by: STUDENT IN AN ORGANIZED HEALTH CARE EDUCATION/TRAINING PROGRAM

## 2025-07-14 PROCEDURE — 74177 CT ABD & PELVIS W/CONTRAST: CPT

## 2025-07-14 PROCEDURE — 99285 EMERGENCY DEPT VISIT HI MDM: CPT | Mod: 25 | Performed by: STUDENT IN AN ORGANIZED HEALTH CARE EDUCATION/TRAINING PROGRAM

## 2025-07-14 PROCEDURE — 74177 CT ABD & PELVIS W/CONTRAST: CPT | Mod: 26 | Performed by: STUDENT IN AN ORGANIZED HEALTH CARE EDUCATION/TRAINING PROGRAM

## 2025-07-14 PROCEDURE — 93010 ELECTROCARDIOGRAM REPORT: CPT | Performed by: STUDENT IN AN ORGANIZED HEALTH CARE EDUCATION/TRAINING PROGRAM

## 2025-07-14 RX ORDER — AMIODARONE HYDROCHLORIDE 200 MG/1
200 TABLET ORAL DAILY
Status: DISCONTINUED | OUTPATIENT
Start: 2025-07-15 | End: 2025-07-14

## 2025-07-14 RX ORDER — AMLODIPINE BESYLATE 5 MG/1
5 TABLET ORAL EVERY EVENING
Status: DISCONTINUED | OUTPATIENT
Start: 2025-07-14 | End: 2025-07-18 | Stop reason: HOSPADM

## 2025-07-14 RX ORDER — DIGOXIN 0.06 MG/1
62.5 TABLET ORAL DAILY
Status: DISCONTINUED | OUTPATIENT
Start: 2025-07-15 | End: 2025-07-18 | Stop reason: HOSPADM

## 2025-07-14 RX ORDER — IOPAMIDOL 755 MG/ML
104 INJECTION, SOLUTION INTRAVASCULAR ONCE
Status: COMPLETED | OUTPATIENT
Start: 2025-07-14 | End: 2025-07-14

## 2025-07-14 RX ORDER — MAGNESIUM OXIDE 400 MG/1
400 TABLET ORAL DAILY
Status: DISCONTINUED | OUTPATIENT
Start: 2025-07-15 | End: 2025-07-14

## 2025-07-14 RX ORDER — AMIODARONE HYDROCHLORIDE 200 MG/1
200 TABLET ORAL EVERY EVENING
Status: DISCONTINUED | OUTPATIENT
Start: 2025-07-14 | End: 2025-07-14

## 2025-07-14 RX ORDER — SULFAMETHOXAZOLE AND TRIMETHOPRIM 800; 160 MG/1; MG/1
1 TABLET ORAL 2 TIMES DAILY
Status: DISCONTINUED | OUTPATIENT
Start: 2025-07-14 | End: 2025-07-15

## 2025-07-14 RX ORDER — WARFARIN SODIUM 2.5 MG/1
2.5 TABLET ORAL
Status: COMPLETED | OUTPATIENT
Start: 2025-07-14 | End: 2025-07-14

## 2025-07-14 RX ORDER — MULTIPLE VITAMINS W/ MINERALS TAB 9MG-400MCG
1 TAB ORAL DAILY
Status: DISCONTINUED | OUTPATIENT
Start: 2025-07-15 | End: 2025-07-18 | Stop reason: HOSPADM

## 2025-07-14 RX ORDER — ATORVASTATIN CALCIUM 80 MG/1
80 TABLET, FILM COATED ORAL EVERY EVENING
Status: DISCONTINUED | OUTPATIENT
Start: 2025-07-14 | End: 2025-07-18 | Stop reason: HOSPADM

## 2025-07-14 RX ORDER — AMIODARONE HYDROCHLORIDE 200 MG/1
200 TABLET ORAL DAILY
Status: DISCONTINUED | OUTPATIENT
Start: 2025-07-15 | End: 2025-07-18 | Stop reason: HOSPADM

## 2025-07-14 RX ORDER — GABAPENTIN 100 MG/1
100 CAPSULE ORAL AT BEDTIME
Status: DISCONTINUED | OUTPATIENT
Start: 2025-07-14 | End: 2025-07-18 | Stop reason: HOSPADM

## 2025-07-14 RX ORDER — ESCITALOPRAM OXALATE 10 MG/1
10 TABLET ORAL DAILY
Status: DISCONTINUED | OUTPATIENT
Start: 2025-07-15 | End: 2025-07-18 | Stop reason: HOSPADM

## 2025-07-14 RX ORDER — AMLODIPINE BESYLATE 5 MG/1
5 TABLET ORAL DAILY
Status: DISCONTINUED | OUTPATIENT
Start: 2025-07-15 | End: 2025-07-14

## 2025-07-14 RX ORDER — MAGNESIUM OXIDE 400 MG/1
400 TABLET ORAL EVERY EVENING
Status: DISCONTINUED | OUTPATIENT
Start: 2025-07-14 | End: 2025-07-18 | Stop reason: HOSPADM

## 2025-07-14 RX ORDER — ACETAMINOPHEN 325 MG/1
650 TABLET ORAL EVERY 4 HOURS PRN
Status: DISCONTINUED | OUTPATIENT
Start: 2025-07-14 | End: 2025-07-18 | Stop reason: HOSPADM

## 2025-07-14 RX ORDER — WARFARIN SODIUM 1 MG/1
2 TABLET ORAL ONCE
Status: DISCONTINUED | OUTPATIENT
Start: 2025-07-14 | End: 2025-07-14

## 2025-07-14 RX ORDER — LIDOCAINE 40 MG/G
CREAM TOPICAL
Status: DISCONTINUED | OUTPATIENT
Start: 2025-07-14 | End: 2025-07-18 | Stop reason: HOSPADM

## 2025-07-14 RX ADMIN — WARFARIN SODIUM 2.5 MG: 2.5 TABLET ORAL at 13:53

## 2025-07-14 RX ADMIN — Medication 10 MG: at 22:04

## 2025-07-14 RX ADMIN — ATORVASTATIN CALCIUM 80 MG: 80 TABLET, FILM COATED ORAL at 21:15

## 2025-07-14 RX ADMIN — IOPAMIDOL 104 ML: 755 INJECTION, SOLUTION INTRAVENOUS at 15:03

## 2025-07-14 RX ADMIN — MAGNESIUM OXIDE TAB 400 MG (241.3 MG ELEMENTAL MG) 400 MG: 400 (241.3 MG) TAB at 22:04

## 2025-07-14 RX ADMIN — GABAPENTIN 100 MG: 100 CAPSULE ORAL at 21:15

## 2025-07-14 RX ADMIN — AMLODIPINE BESYLATE 5 MG: 5 TABLET ORAL at 22:04

## 2025-07-14 RX ADMIN — SULFAMETHOXAZOLE AND TRIMETHOPRIM 1 TABLET: 800; 160 TABLET ORAL at 21:16

## 2025-07-14 RX ADMIN — PSYLLIUM HUSK 1 PACKET: 3.4 POWDER ORAL at 18:49

## 2025-07-14 ASSESSMENT — ACTIVITIES OF DAILY LIVING (ADL)
ADLS_ACUITY_SCORE: 59
ADLS_ACUITY_SCORE: 57
ADLS_ACUITY_SCORE: 59

## 2025-07-14 NOTE — CONSULTS
CARDIOTHORACIC SURGERY CONSULT NOTE  7/14/2025      Reason for Consult: Increased driveline mobility in setting of current infection, question of driveline stitch benefit      ASSESSMENT/PLAN: Patient is a 75 year old male with a history of CAD including anterior STEMI s/p PCI to the pLAD on 10/26/23 with a VT/VF arrest the following day (10/27). He had had multiple hospitalizations related to heart failure exacerbation with HFrEF, ischemic cardiomyopathy, s/p HM3 LVAD c/b frequent low flow alarms s/p low flow controller on 9/18/24 with Dr. Mulvihill.     Patient presented to his cardiology clinic follow up on 7/2/25 with drainage from his driveline site which was cultured and returned positive for corynebacterium striatum.     Patient has had quite a bit of driveline mobility putting him at infection risk, CVTS consulted to assess benefit of driveline stitch.     Patient discussed and seen with Dr. Enrique Mena  - Do not recommend placing a driveline stitch. Recommend continuing to use anchor for line management.   - Recommend continued antibiotics per ID  - Other cares per Cardiology   - Thank you for the opportunity to participate in the care of this patient.    Patient and plan discussed with attending, Dr. Enrique Gutierrez PA-C  Cardiothoracic Surgery  July 14, 2025 2:07 PM   Available on [employer]      ________________________________________________________________________________________________    HPI: Patient is a 75 year old male with a history of HM3 LVAD with Dr. Mulvihill on 9/18/24. He called the cardiology RN with complaints of vomiting and dizziness and he was instructed to visit the ED.     He currently complains of some movement with his driveline though he does admit changing the position of his anchor has been helpful.      Patient currently admitted under cardiology for driveline infection and CVTS was consulted for consideration of placing a stitch around driveline.    PMH:  Past  "Medical History:   Diagnosis Date    Benign essential hypertension 1/26/2016    CAD (coronary artery disease)     Chronic systolic heart failure (H)     Hypertension     ST elevation myocardial infarction (STEMI), unspecified artery (H) 10/26/2023    Ventricular fibrillation (H)        Clinically Significant Risk Factors Present on Admission         # Hyponatremia: Lowest Na = 128 mmol/L in last 2 days, will monitor as appropriate  # Hypochloremia: Lowest Cl = 95 mmol/L in last 2 days, will monitor as appropriate       # Drug Induced Coagulation Defect: home medication list includes an anticoagulant medication     # End stage heart failure: Ventricular assist device (VAD) present     # Anemia: based on hgb <11       # Overweight: Estimated body mass index is 26.63 kg/m  as calculated from the following:    Height as of this encounter: 1.702 m (5' 7\").    Weight as of this encounter: 77.1 kg (170 lb).       # Financial/Environmental Concerns:     # ICD device present         PSH:  Past Surgical History:   Procedure Laterality Date    ANESTHESIA CARDIOVERSION N/A 9/5/2024    Procedure: Anesthesia cardioversion;  Surgeon: GENERIC ANESTHESIA PROVIDER;  Location: [facility] OR    ANESTHESIA CARDIOVERSION N/A 9/30/2024    Procedure: Anesthesia cardioversion;  Surgeon: GENERIC ANESTHESIA PROVIDER;  Location: [facility] OR    COLONOSCOPY N/A 3/23/2023    Procedure: COLONOSCOPY, FLEXIBLE, WITH LESION REMOVAL USING SNARE;  Surgeon: Jose Faustin MD;  Location: [facility]    CV CENTRAL VENOUS CATHETER PLACEMENT N/A 10/26/2023    Procedure: Central Venous Catheter Placement;  Surgeon: Rob Lyles MD;  Location: [facility] CARDIAC CATH LAB    CV CORONARY ANGIOGRAM N/A 10/27/2023    Procedure: Coronary Angiogram;  Surgeon: Rob Lyles MD;  Location: [facility] CARDIAC CATH LAB    CV CORONARY ANGIOGRAM N/A 10/26/2023    Procedure: Coronary Angiogram;  Surgeon: Rob Lyles MD;  Location: [facility] CARDIAC CATH LAB "    CV LEFT HEART CATH N/A 10/26/2023    Procedure: Left Heart Catheterization;  Surgeon: Rob Lyles MD;  Location:  HEART CARDIAC CATH LAB    CV PCI N/A 10/27/2023    Procedure: Percutaneous Coronary Intervention;  Surgeon: Rob Lyles MD;  Location:  HEART CARDIAC CATH LAB    CV PCI STENT DRUG ELUTING N/A 10/26/2023    Procedure: Percutaneous Coronary Intervention Stent;  Surgeon: Rob Lyles MD;  Location:  HEART CARDIAC CATH LAB    CV RIGHT HEART CATH MEASUREMENTS RECORDED N/A 5/6/2024    Procedure: Heart Cath Right Heart Cath;  Surgeon: Rob Lyles MD;  Location:  HEART CARDIAC CATH LAB    CV RIGHT HEART CATH MEASUREMENTS RECORDED N/A 9/3/2024    Procedure: Right Heart Catheterization;  Surgeon: Aaron Mesa MD;  Location:  HEART CARDIAC CATH LAB    CV RIGHT HEART CATH MEASUREMENTS RECORDED N/A 10/24/2024    Procedure: Right Heart Catheterization;  Surgeon: Haile Braden MD;  Location:  HEART CARDIAC CATH LAB    CV RIGHT HEART CATH MEASUREMENTS RECORDED N/A 3/18/2025    Procedure: Right Heart Catheterization with VAD Speed Optimization (no echo);  Surgeon: Aaron Mesa MD;  Location: [facility] CARDIAC CATH LAB    EP ICD INSERT SINGLE N/A 5/8/2024    Procedure: Implantable Cardioverter Defibrillator Device & Lead Implant Dual;  Surgeon: Guero Black MD;  Location: [facility] CARDIAC CATH LAB    INJECTION, HYDROGEL SPACER N/A 4/22/2024    Procedure: INJECTION, HYDROGEL SPACER AND FIDUCIAL MARKER PLACEMENT;  Surgeon: Stanislaw Barros MD;  Location:  OR    INSERT VENTRICULAR ASSIST DEVICE LEFT (HEARTMATE II) N/A 9/18/2024    Procedure: Median Sternotomy, INSERTION of LEFT VENTRICULAR ASSIST DEVICE (HEARTMATE III), cardiopulmonary bypass, transesophageal echocardiogram performed by anesthesia.;  Surgeon: Mulvihill, Michael, MD;  Location:  OR    IRRIGATION AND DEBRIDEMENT CHEST WASHOUT, COMBINED Right 9/18/2024     Procedure: Exploration of chest, chest washout;  Surgeon: Mulvihill, Michael, MD;  Location: [facility] OR    SIGMOIDOSCOPY FLEXIBLE N/A 2025    Procedure: Sigmoidoscopy flexible;  Surgeon: Jp Cartwright MD;  Location: [facility] GI       FH:  Family History   Problem Relation Age of Onset    Other Cancer Mother     Obesity Mother     GI problems Father     Uterine Cancer Sister        SH:  Social History     Socioeconomic History    Marital status:    Tobacco Use    Smoking status: Former     Current packs/day: 0.00     Average packs/day: 0.3 packs/day for 30.0 years (7.5 ttl pk-yrs)     Types: Cigarettes     Start date: 10/26/1993     Quit date: 10/26/2023     Years since quittin.7     Passive exposure: Past    Smokeless tobacco: Never    Tobacco comments:     Quit 10/26/2023   Vaping Use    Vaping status: Never Used   Substance and Sexual Activity    Alcohol use: Yes     Comment: 1-2 beers per day    Drug use: No    Sexual activity: Yes     Partners: Female     Birth control/protection: None   Other Topics Concern    Parent/sibling w/ CABG, MI or angioplasty before 65F 55M? No     Social Drivers of Health     Financial Resource Strain: Low Risk  (2025)    Financial Resource Strain     Within the past 12 months, have you or your family members you live with been unable to get utilities (heat, electricity) when it was really needed?: No   Food Insecurity: Low Risk  (2025)    Food Insecurity     Within the past 12 months, did you worry that your food would run out before you got money to buy more?: No     Within the past 12 months, did the food you bought just not last and you didn t have money to get more?: No   Transportation Needs: Low Risk  (2025)    Transportation Needs     Within the past 12 months, has lack of transportation kept you from medical appointments, getting your medicines, non-medical meetings or appointments, work, or from getting things that you need?: No   Physical Activity:  Inactive (11/16/2023)    Exercise Vital Sign     Days of Exercise per Week: 0 days     Minutes of Exercise per Session: 0 min   Social Connections: Unknown (11/16/2023)    Social Connection and Isolation Panel [NHANES]     Marital Status:    Interpersonal Safety: Low Risk  (3/18/2025)    Interpersonal Safety     Do you feel physically and emotionally safe where you currently live?: Yes     Within the past 12 months, have you been hit, slapped, kicked or otherwise physically hurt by someone?: No     Within the past 12 months, have you been humiliated or emotionally abused in other ways by your partner or ex-partner?: No   Housing Stability: High Risk (2/5/2025)    Housing Stability     Do you have housing? : No     Are you worried about losing your housing?: No       Home Meds:  (Not in a hospital admission)    Allergies:  Allergies   Allergen Reactions    Brilinta [Ticagrelor] Other (See Comments) and Difficulty breathing     Per pt and spouse, hyperventilation.    Clonazepam Other (See Comments)     Per spouse, acted like a zombie and he was shaky, could barely talk.       Physical Exam:  Temp:  [98.2  F (36.8  C)] 98.2  F (36.8  C)  Pulse:  [62-74] 62  Resp:  [16] 16  SpO2:  [98 %-100 %] 100 %  Gen: NAD, resting comfortably in bed, conversational  Skin: Driveline site examines, some thick drainage near insertion site. Not much movement of driveline as patient converses.   Neuro: AOx3, CN II-VII grossly intact, sensation/motor intact in upper and lower extremities  Mental: normal mood and affect, regular rate of speech    Labs:  ABG No lab results found in last 7 days.  CBC  Recent Labs   Lab 07/14/25  1131 07/07/25  1506   WBC 4.2  --    HGB 9.0* 9.2*     --      BMP  Recent Labs   Lab 07/14/25  1131   *   POTASSIUM 4.7   CHLORIDE 95*   CO2 21*   BUN 15.4   CR 1.13   *     LFT  Recent Labs   Lab 07/14/25  1131 07/11/25  1128 07/07/25  1506   AST 79*  --   --    ALT 64  --   --    ALKPHOS  109  --   --    BILITOTAL 0.3  --   --    ALBUMIN 4.0  --   --    INR 1.52* 1.75* 1.62*     PancreasNo lab results found in last 7 days.    Imaging:  Recent Results (from the past 24 hours)   XR Chest 2 Views    Narrative    Chest 2 views    INDICATION: Short of breath    COMPARISON: CT chest 10/17/2024. Most recent plain film on PACS  10/3/2024    Heart size upper normal. Implantable cardiac defibrillator and LVAD  again noted. Median sternotomy again present. Atherosclerotic  calcification of the aortic knob. No significant change in the  parenchymal pattern of the lungs on the PA view. Lateral view shows  osteopenia. There is also descending thoracic aortic calcification.      Impression    IMPRESSION: Borderline cardiac regular with implantable cardiac  defibrillator and LVAD. Atherosclerosis.    ANGELICA PRATHER MD         SYSTEM ID:  [id number]

## 2025-07-14 NOTE — H&P
[facility]   Cardiology II Service / Advanced Heart Failure  History & Physical    Johnson, Duane  : 1950  MRN # [id number]    ADMIT DATE: 2025    PCP: Jose Coles    CHIEF COMPLAINT: Weakness, dizziness, driveline drainage    HPI: Duane C Johnson is a 75 year old male with a past medical history including CAD with history of anterior STEMI s/p PCI to the pLAD on 10/26/23 with a VT/VF arrest the next day (10/27), multiple subsequent admissions for HF exacerbation, with HFrEF 2/2 ICM s/p HM3 LVAD c/b frequent low atilio alarms s/p low-flow controller. He was recently started on oral antibiotics for driveline infection and is now presenting with increased weakness, dizziness, episodes of emesis, and ongoing driveline drainage.     ASSESSMENT & PLAN:  Duane C Johnson is a 75 year old male who presents with acute driveline infection with progressive weakness, dizziness, nausea and vomiting.     Today's Plan:  - LVAD driveline culture  - CT abdomen/pelvis with contrast to r/o infectious fluid collection or abscess   - continue PTA bactrim DS  - ID consult   - CVTS consult to evaluate for utility of driveline suture 2/2 to lack of driveline seal       # LVAD driveline infection  Seen in clinic 25 with reports of ongoing driveline drainage x 1 month. Driveline cultures + corynebacterium striatum. Started on doxy and then coverage narrowed to Bactrim DS BID with plan for 2 week course through ~.    - CT abdomen/pelvis with contrast pending   - repeat driveline cx pending  - continue PTA Bactrim x 2 week course    - ID consult   - CVTS consult for lack of driveline seal, increased infection risk, eval for utility of driveline suture    - daily LVAD dressing changes     # Chronic systolic heart failure secondary to ICM s/p HM3 LVAD as DT on 24.    Stage C. NYHA Class III.     Fluid status euvolemic- doing well with aggressive fluid and salt intake, not on a loop diuretic  ACEi/ARB:  none given frequent hypotension  Afterload- continue PTA amlodipine 5 mg daily  BB: none given frequent hypotension   Aldosterone antagonist: avoiding d/t frequent dehydration  SGLT2i: avoiding d/t frequent dehydration  SCD prophylaxis ICD  NSAID use: contraindicated  BP: MAP goal 65-85  LDH trends: 194, stable  Anticoagulation: warfarin, INR goal 2.0-2.5 given periodic rectal bleeding with radiation proctitis. Today's INR 1.5, dosing per pharmacy. Defer bridge.   Antiplatelet: N/A  Other: continue mag ox 400 mg daily     # Low flow alarms, resolved  # Labile MAPs   # Suspected orthostatic hypotension, resolved  Low flows seems releated to hypertension and some hypovolemia as well. Have improved with re-timing hydralazine and after increasing oral fluids. CTA was done on  10/17/24 with no outflow graft ostruction. The outflow graft does make an acute angle as it approaches the aorta. Dr. Smith has reviewed inflow and outflow positioning, nothing to do . Speed was dropped to 5000 in oct 2024 which helped as well as stopping losartan and sglt2i as above.   - Speed set at 5000- decreased speed in Oct 2024 helped low-flow alarms  - Off all medications with a diuretic effecy  - Other BP med management as above  - Previously stopped fludrocortisone 0.1 mg daily   - Has a LOW FLOW controller, will not alarm until flow is </= 2  - Continue to push fluids, including gatorade, salty snacks, etc      # History of VT/VF arrest 2023 2/2 STEMI  # AFib s/p DCCV 9/2024 and again 9/30/2024   Has had multiple attempted cardioversions. Eventually planned for just rate control, so no further cardioversions were planned, but now back in sinus.   - ICD 6/27/25 without any atrial fibrillation since the last icd check, continue checks per protocol   - Continue amiodarone 200 mg daily  - Continue digoxin 62.5 mcg daily   - AC as above     # CAD w/h/o anterior STEMI s/p pLAD stent in 2023  - On coumadin, no asa given on coumadin  - BB  deferred as above  - Continue atorvastatin 80 mg daily     # Dental concerns. Has no teeth. Needs dentures or alternative planning but has been told that he cannot have that until his abnormal bone growith along lower jaw is addressed. He has seen local dentists and oral surgeons but has been told his complexity is above their clinics ability  - Referred to oral surgery at [facility] at discharge      # Rectal bleeding  # Acute blood loss anemia  Has had episodes of rectal bleeding, none within the last 2 weeks. He has seen GI and colorectal surgery twice. Has been told this is due to his radiation proctitis. Hgb has dropped in the last 2 months, presently stable ~ 9.  - daily CBC for now, consider spacing out  - recent iron studies iron 30, iron sat 13. He would benefit from IV iron, however will defer until driveline infection improving and near completion of treatment     # Cognitive changes  - Needs formal driving assessment with DMV or OT prior to being cleared to drive, wife aware and also discussed with patient at time of discharge- we discussed this again today. Would defer for at least another 3 months with ongoing medical issues  - Repeat neuropsych testing in 1 year post discharge from index admission  - Referred to behavioral neurology in place     # Inguinal hernia, can self-reduce. No pain, but irritating and uncomfortable. Recently referred to general surgery, was scheduled to see in clinic on 7/14, but will need to reschedule due to hospital admission.      FEN: regular  PROPHY:  warfarin   LINES:  PIV  DISPO:  2-5 days   CODE STATUS:  Full Code    Time/Communication  I spent 60 minutes reviewing results, care team notes, meeting directly with the patient, coordinating care, and completing documentation on the day of service.     Patient discussed with Dr. Laurent.      Sirisha Arias, CHICO, FNP-C  Advanced Heart Failure/Cardiology II Service  Vocera Preferred or Pager  [phone]  ================================================================    ROS:   Skin: negative  Eyes: negative  Ears/Nose/Throat: negative  Respiratory: No shortness of breath, dyspnea on exertion, cough, or hemoptysis  Cardiovascular: negative  Gastrointestinal: negative, nausea, and vomiting  Genitourinary: negative  Musculoskeletal: muscular weakness  Neurologic: negative  Psychiatric: negative  Hematologic/Lymphatic/Immunologic: anemia and bleeding disorder  Endocrine: hot flashes      PMH:  Past Medical History:   Diagnosis Date    Benign essential hypertension 1/26/2016    CAD (coronary artery disease)     Chronic systolic heart failure (H)     Hypertension     ST elevation myocardial infarction (STEMI), unspecified artery (H) 10/26/2023    Ventricular fibrillation (H)        PSH:  Past Surgical History:   Procedure Laterality Date    ANESTHESIA CARDIOVERSION N/A 9/5/2024    Procedure: Anesthesia cardioversion;  Surgeon: GENERIC ANESTHESIA PROVIDER;  Location: [facility] OR    ANESTHESIA CARDIOVERSION N/A 9/30/2024    Procedure: Anesthesia cardioversion;  Surgeon: GENERIC ANESTHESIA PROVIDER;  [facility]    COLONOSCOPY N/A 3/23/2023    Procedure: COLONOSCOPY, FLEXIBLE, WITH LESION REMOVAL USING SNARE;  Surgeon: Jose Faustin MD;  Location: [facility]    CV CENTRAL VENOUS CATHETER PLACEMENT N/A 10/26/2023    Procedure: Central Venous Catheter Placement;  Surgeon: Rob Lyles MD;  Location:  HEART CARDIAC CATH LAB    CV CORONARY ANGIOGRAM N/A 10/27/2023    Procedure: Coronary Angiogram;  Surgeon: Rbo Lyles MD;  Location:  HEART CARDIAC CATH LAB    CV CORONARY ANGIOGRAM N/A 10/26/2023    Procedure: Coronary Angiogram;  Surgeon: Rob Lyles MD;  Location:  HEART CARDIAC CATH LAB    CV LEFT HEART CATH N/A 10/26/2023    Procedure: Left Heart Catheterization;  Surgeon: Rob Lyles MD;  Location:  HEART CARDIAC CATH LAB    CV PCI N/A 10/27/2023     Procedure: Percutaneous Coronary Intervention;  Surgeon: Rob Lyles MD;  Location:  HEART CARDIAC CATH LAB    CV PCI STENT DRUG ELUTING N/A 10/26/2023    Procedure: Percutaneous Coronary Intervention Stent;  Surgeon: Rob Lyles MD;  Location:  HEART CARDIAC CATH LAB    CV RIGHT HEART CATH MEASUREMENTS RECORDED N/A 5/6/2024    Procedure: Heart Cath Right Heart Cath;  Surgeon: Rob Lyles MD;  Location:  HEART CARDIAC CATH LAB    CV RIGHT HEART CATH MEASUREMENTS RECORDED N/A 9/3/2024    Procedure: Right Heart Catheterization;  Surgeon: Aaron Mesa MD;  Location:  HEART CARDIAC CATH LAB    CV RIGHT HEART CATH MEASUREMENTS RECORDED N/A 10/24/2024    Procedure: Right Heart Catheterization;  Surgeon: Haile Braden MD;  Location:  HEART CARDIAC CATH LAB    CV RIGHT HEART CATH MEASUREMENTS RECORDED N/A 3/18/2025    Procedure: Right Heart Catheterization with VAD Speed Optimization (no echo);  Surgeon: Aaron Mesa MD;  Location:  HEART CARDIAC CATH LAB    EP ICD INSERT SINGLE N/A 5/8/2024    Procedure: Implantable Cardioverter Defibrillator Device & Lead Implant Dual;  Surgeon: Guero Black MD;  Location:  HEART CARDIAC CATH LAB    INJECTION, HYDROGEL SPACER N/A 4/22/2024    Procedure: INJECTION, HYDROGEL SPACER AND FIDUCIAL MARKER PLACEMENT;  Surgeon: Stanislaw Barros MD;  Location: [facility]    INSERT VENTRICULAR ASSIST DEVICE LEFT (HEARTMATE II) N/A 9/18/2024    Procedure: Median Sternotomy, INSERTION of LEFT VENTRICULAR ASSIST DEVICE (HEARTMATE III), cardiopulmonary bypass, transesophageal echocardiogram performed by anesthesia.;  Surgeon: Mulvihill, Michael, MD;  Location: [facility]    IRRIGATION AND DEBRIDEMENT CHEST WASHOUT, COMBINED Right 9/18/2024    Procedure: Exploration of chest, chest washout;  Surgeon: Mulvihill, Michael, MD;  Location: [facility]    SIGMOIDOSCOPY FLEXIBLE N/A 2/5/2025    Procedure: Sigmoidoscopy flexible;  Surgeon:  Jp Cartwright MD;  Location:  GI       MEDICATIONS:  Prior to Admission Medications   Prescriptions Last Dose Informant Patient Reported? Taking?   acetaminophen (TYLENOL) 325 MG tablet More than a month  No Yes   Sig: Take 2 tablets (650 mg) by mouth every 4 hours as needed for other (For optimal non-opioid multimodal pain management to improve pain control.).   amLODIPine (NORVASC) 10 MG tablet 7/13/2025 Evening  No Yes   Sig: Take 0.5 tablets (5 mg) by mouth daily.   amiodarone (PACERONE) 200 MG tablet 7/14/2025 Morning  No Yes   Sig: Take 1 tablet (200 mg) by mouth daily.   atorvastatin (LIPITOR) 80 MG tablet 7/13/2025 Evening  No Yes   Sig: Take 1 tablet (80 mg) by mouth every evening.   digoxin (LANOXIN) 125 MCG tablet 7/13/2025  No Yes   Sig: Take 0.5 tablets (62.5 mcg) by mouth daily.   escitalopram (LEXAPRO) 10 MG tablet 7/13/2025  No Yes   Sig: Take 1 tablet (10 mg) by mouth or Feeding Tube daily.   gabapentin (NEURONTIN) 100 MG capsule 7/13/2025 Bedtime  No Yes   Sig: Take 1 capsule (100 mg) by mouth or Feeding Tube at bedtime.   magnesium hydroxide (MILK OF MAGNESIA) 400 MG/5ML suspension Unknown  No No   Sig: Take 30 mLs by mouth daily as needed for constipation (Use if polyethylene glycol (Miralax) is not effective after 24 hours.).   magnesium oxide (MAG-OX) 400 MG tablet 7/13/2025 Evening  No Yes   Sig: Take 1 tablet (400 mg) by mouth daily.   melatonin 10 MG TABS tablet 7/13/2025 Evening  No Yes   Sig: Take 1 tablet (10 mg) by mouth every evening.   multivitamin w/minerals (THERA-VIT-M) tablet 7/13/2025  No Yes   Sig: Take 1 tablet by mouth daily.   sulfamethoxazole-trimethoprim (BACTRIM DS) 800-160 MG tablet 7/13/2025 at  8:00 PM  No Yes   Sig: Take 1 tablet by mouth 2 times daily.   warfarin ANTICOAGULANT (COUMADIN) 2.5 MG tablet 7/14/2025 Morning  Yes Yes   Sig: Take 2.5 mg by mouth in the morning, Mon and Thur. 1.25 mg by mouth ALL OTHER DAYS.      Facility-Administered Medications:  None        ALLERGIES:     Allergies   Allergen Reactions    Brilinta [Ticagrelor] Other (See Comments) and Difficulty breathing     Per pt and spouse, hyperventilation.    Clonazepam Other (See Comments)     Per spouse, acted like a zombie and he was shaky, could barely talk.       FAMILY HISTORY:  Family History   Problem Relation Age of Onset    Other Cancer Mother     Obesity Mother     GI problems Father     Uterine Cancer Sister        SOCIAL HISTORY:  Social History     Socioeconomic History    Marital status:      Spouse name: Not on file    Number of children: Not on file    Years of education: Not on file    Highest education level: Not on file   Occupational History    Not on file   Tobacco Use    Smoking status: Former     Current packs/day: 0.00     Average packs/day: 0.3 packs/day for 30.0 years (7.5 ttl pk-yrs)     Types: Cigarettes     Start date: 10/26/1993     Quit date: 10/26/2023     Years since quittin.7     Passive exposure: Past    Smokeless tobacco: Never    Tobacco comments:     Quit 10/26/2023   Vaping Use    Vaping status: Never Used   Substance and Sexual Activity    Alcohol use: Yes     Comment: 1-2 beers per day    Drug use: No    Sexual activity: Yes     Partners: Female     Birth control/protection: None   Other Topics Concern    Parent/sibling w/ CABG, MI or angioplasty before 65F 55M? No   Social History Narrative    Not on file     Social Drivers of Health     Financial Resource Strain: Low Risk  (2025)    Financial Resource Strain     Within the past 12 months, have you or your family members you live with been unable to get utilities (heat, electricity) when it was really needed?: No   Food Insecurity: Low Risk  (2025)    Food Insecurity     Within the past 12 months, did you worry that your food would run out before you got money to buy more?: No     Within the past 12 months, did the food you bought just not last and you didn t have money to get more?: No  "  Transportation Needs: Low Risk  (2/5/2025)    Transportation Needs     Within the past 12 months, has lack of transportation kept you from medical appointments, getting your medicines, non-medical meetings or appointments, work, or from getting things that you need?: No   Physical Activity: Inactive (11/16/2023)    Exercise Vital Sign     Days of Exercise per Week: 0 days     Minutes of Exercise per Session: 0 min   Stress: Not on file   Social Connections: Unknown (11/16/2023)    Social Connection and Isolation Panel [NHANES]     Frequency of Communication with Friends and Family: Not on file     Frequency of Social Gatherings with Friends and Family: Not on file     Attends Orthodox Services: Not on file     Active Member of Clubs or Organizations: Not on file     Attends Club or Organization Meetings: Not on file     Marital Status:    Interpersonal Safety: Low Risk  (3/18/2025)    Interpersonal Safety     Do you feel physically and emotionally safe where you currently live?: Yes     Within the past 12 months, have you been hit, slapped, kicked or otherwise physically hurt by someone?: No     Within the past 12 months, have you been humiliated or emotionally abused in other ways by your partner or ex-partner?: No   Housing Stability: High Risk (2/5/2025)    Housing Stability     Do you have housing? : No     Are you worried about losing your housing?: No       PHYSICAL EXAM:  Pulse 62, temperature 98.2  F (36.8  C), temperature source Oral, resp. rate 16, height 1.702 m (5' 7\"), weight 77.1 kg (170 lb), SpO2 100%.    GENERAL: Appears comfortable, in no acute distress.   HEENT: Eye symmetrical, no discharge or icterus bilaterally. Mucous membranes moist and without lesions.  NECK: Supple, JVD at clavicle at 90 degrees.   CV: + LVAD hum  RESPIRATORY: Respirations regular, even, and unlabored. Lungs CTA throughout.   GI: Soft and non distended with normoactive bowel sounds present in all quadrants. No " tenderness, rebound, guarding. No organomegaly.   EXTREMITIES: no peripheral edema. 2+ bilateral pedal pulses.   NEUROLOGIC: Alert and orientated x 3. No focal deficits.   MUSCULOSKELETAL: No joint swelling or tenderness.   SKIN: No jaundice. No rashes or lesions.     The patient's HeartMate LVAD was interrogated 7/14/2025  Heartmate 3 LEFT VS  Flow (Lpm): 3.2 Lpm  Pulse Index (PI): 5.1 PI  Speed (rpm): 5000 rpm  Power (jackson): 3.2 jackson  Fluid status: euvolemic   Alarms were reviewed, and notable for frequent PI events, no low flows. .   The driveline exit site was inspected, c/d/I.   All external components were inspected and showed no evidence of damage or malfunction, none replaced.   No changes to VAD settings made      LABS:  CBC:  Recent Labs   Lab Test 07/14/25  1131   WBC 4.2   RBC 3.02*   HGB 9.0*   HCT 27.3*   MCV 90   MCH 29.8   MCHC 33.0   RDW 15.5*          CMP:  Recent Labs   Lab Test 07/14/25  1131   *   POTASSIUM 4.7   CHLORIDE 95*   PRANAV 9.0   CO2 21*   BUN 15.4   CR 1.13   *   AST 79*   ALT 64   BILITOTAL 0.3   ALBUMIN 4.0   PROTTOTAL 7.1   ALKPHOS 109       I have reviewed today's vital signs, notes, medications, labs and imaging.  I have also seen and examined the patient and agree with the findings and plan as outlined above.  Pt is 76 yo WM with hx of ischemic DCM, S/P VT/VF arrest, S/P HM3 implant with course complicated by driveline infection with pt on oral abx.  Pt reports approx one week hx of nausea, emesis, fatigue and persistent drainage from driveline.  Denies bowel, bladder problems or productive cough.  Denies any changes in LVAD parameters.  VSS Labs as above.  Plan is to evaluate pt for infection of which the driveline is the likely culprit.  Plan for cultures and IV abx with ID consult.      Cristobal Laurent MD, PhD  Professor, Heart Failure and Cardiac Transplantation  [facility]

## 2025-07-14 NOTE — PHARMACY-ANTICOAGULATION SERVICE
Clinical Pharmacy - Warfarin Dosing Consult     Pharmacy has been consulted to manage this patient s warfarin therapy.  Indication: LVAD/RVAD  Therapy Goal: INR 2-2.5 (had previously been 2-3)  OP Anticoag Clinic: [facility]  Warfarin Prior to Admission: Yes  Warfarin PTA Regimen: 2.5mg monday and thursday, 1.25mg all other days  Significant drug interactions: amiodarone (pta med), bactrim (pta med)  Recent documented change in oral intake/nutrition: Unknown  Dose Comments: 2.5mg on 7/14 per provider    INR   Date Value Ref Range Status   07/14/2025 1.52 (H) 0.85 - 1.15 Final   07/11/2025 1.75 (H) 0.85 - 1.15 Final       Give warfarin 2.5 mg today per provider request (conservative dosing today due to concern for rectal bleeding per cardiology provider).  Pharmacy will monitor Duane C Johnson daily and order warfarin doses to achieve specified goal.      Please contact pharmacy as soon as possible if the warfarin needs to be held for a procedure or if the warfarin goals change.     Ag Hansen, PharmD, BCPS

## 2025-07-14 NOTE — PROGRESS NOTES
Situation:   Writer spoke with Patient today to discuss Vomiting and dizziness.    Background:  Weight today:  170  Compared to recent values this weight is remained the same.     Assessment:  NIBP/MAP: Has not done BP in a week.  VAD parameters:          07/14/25 0900   Heartmate 3 LEFT VS   Flow (Lpm) 3.7 Lpm   Pulse Index (PI) 4.2 PI   Speed (rpm) 5000 rpm   Power (jackson) 3.2 jackson     Symptoms:   Pt has been having episodes of dizziness and needs to sit down any time he ambulates.  N/V new this morning.         Recommendation:  Pt going to [facility].  Update to ED, Primary VAD Coordinator and to Cards 2 Attending.

## 2025-07-14 NOTE — ED TRIAGE NOTES
To triage for weakness, dizziness, N/V x 2 week, worsening over the past 3 days  LVAD HM3     Triage Assessment (Adult)       Row Name 07/14/25 1051          Triage Assessment    Airway WDL WDL        Respiratory WDL    Respiratory WDL WDL        Skin Circulation/Temperature WDL    Skin Circulation/Temperature WDL WDL        Cardiac WDL    Cardiac WDL WDL        Peripheral/Neurovascular WDL    Peripheral Neurovascular WDL WDL        Cognitive/Neuro/Behavioral WDL    Cognitive/Neuro/Behavioral WDL WDL

## 2025-07-14 NOTE — ED PROVIDER NOTES
ED Provider Note  [facility]      History   No chief complaint on file.    HPI  Duane C Johnson is a 75 year old male with a history of heart failure with an LVAD in place, HTN, CAD, STEMI, ventricular fibrillation who presents to the emergency department with generalized weakness.  Patient reports that he has been experiencing progressively worsening weakness send lethargy for the past week or two.  He has also been experiencing hot flashes.  He had an episode of emesis this past Friday and one today as well.  He states that he was told by his LVAD coordinator to present to the ED this past Friday, but he was unable to due to severe weakness.  Patient's partner reports that he has also been told that his provider was concerned for an infection near the site of his left lower abdomen.  His partner states that she has noticed brown discharge around this area.        Past Medical History  Past Medical History:   Diagnosis Date    Benign essential hypertension 1/26/2016    CAD (coronary artery disease)     Chronic systolic heart failure (H)     Hypertension     ST elevation myocardial infarction (STEMI), unspecified artery (H) 10/26/2023    Ventricular fibrillation (H)      Past Surgical History:   Procedure Laterality Date    ANESTHESIA CARDIOVERSION N/A 9/5/2024    Procedure: Anesthesia cardioversion;  Surgeon: GENERIC ANESTHESIA PROVIDER;  [facility]    ANESTHESIA CARDIOVERSION N/A 9/30/2024    Procedure: Anesthesia cardioversion;  Surgeon: GENERIC ANESTHESIA PROVIDER;  Location:  OR    COLONOSCOPY N/A 3/23/2023    Procedure: COLONOSCOPY, FLEXIBLE, WITH LESION REMOVAL USING SNARE;  Surgeon: Jose Faustin MD;  Location: [facility]    CV CENTRAL VENOUS CATHETER PLACEMENT N/A 10/26/2023    Procedure: Central Venous Catheter Placement;  Surgeon: Rob Lyles MD;  Location:  HEART CARDIAC CATH LAB    CV CORONARY ANGIOGRAM N/A 10/27/2023    Procedure: Coronary Angiogram;   Surgeon: Rob Lyles MD;  Location:  HEART CARDIAC CATH LAB    CV CORONARY ANGIOGRAM N/A 10/26/2023    Procedure: Coronary Angiogram;  Surgeon: Rob Lyles MD;  Location:  HEART CARDIAC CATH LAB    CV LEFT HEART CATH N/A 10/26/2023    Procedure: Left Heart Catheterization;  Surgeon: Rob Lyles MD;  Location:  HEART CARDIAC CATH LAB    CV PCI N/A 10/27/2023    Procedure: Percutaneous Coronary Intervention;  Surgeon: Rob Lyles MD;  [facility]    CV PCI STENT DRUG ELUTING N/A 10/26/2023    Procedure: Percutaneous Coronary Intervention Stent;  Surgeon: Rob Lyles MD;  Location:  HEART CARDIAC CATH LAB    CV RIGHT HEART CATH MEASUREMENTS RECORDED N/A 5/6/2024    Procedure: Heart Cath Right Heart Cath;  Surgeon: Rob Lyles MD;  Location:  HEART CARDIAC CATH LAB    CV RIGHT HEART CATH MEASUREMENTS RECORDED N/A 9/3/2024    Procedure: Right Heart Catheterization;  Surgeon: Aaron Mesa MD;  [facility]    CV RIGHT HEART CATH MEASUREMENTS RECORDED N/A 10/24/2024    Procedure: Right Heart Catheterization;  Surgeon: Haile Braden MD;  Location:  HEART CARDIAC CATH LAB    CV RIGHT HEART CATH MEASUREMENTS RECORDED N/A 3/18/2025    Procedure: Right Heart Catheterization with VAD Speed Optimization (no echo);  Surgeon: Aaron Mesa MD;  Location:  HEART CARDIAC CATH LAB    EP ICD INSERT SINGLE N/A 5/8/2024    Procedure: Implantable Cardioverter Defibrillator Device & Lead Implant Dual;  Surgeon: Guero Black MD;  Location:  HEART CARDIAC CATH LAB    INJECTION, HYDROGEL SPACER N/A 4/22/2024    Procedure: INJECTION, HYDROGEL SPACER AND FIDUCIAL MARKER PLACEMENT;  Surgeon: Stanislaw Barros MD;  Location: [facility]    INSERT VENTRICULAR ASSIST DEVICE LEFT (HEARTMATE II) N/A 9/18/2024    Procedure: Median Sternotomy, INSERTION of LEFT VENTRICULAR ASSIST DEVICE  (HEARTMATE III), cardiopulmonary bypass, transesophageal echocardiogram performed by anesthesia.;  Surgeon: Mulvihill, Michael, MD;  [facility]    IRRIGATION AND DEBRIDEMENT CHEST WASHOUT, COMBINED Right 2024    Procedure: Exploration of chest, chest washout;  Surgeon: Mulvihill, Michael, MD;  [facility]    SIGMOIDOSCOPY FLEXIBLE N/A 2025    Procedure: Sigmoidoscopy flexible;  Surgeon: Jp Cartwright MD;  [facility]     acetaminophen (TYLENOL) 325 MG tablet  amiodarone (PACERONE) 200 MG tablet  amLODIPine (NORVASC) 10 MG tablet  atorvastatin (LIPITOR) 80 MG tablet  digoxin (LANOXIN) 125 MCG tablet  escitalopram (LEXAPRO) 10 MG tablet  gabapentin (NEURONTIN) 100 MG capsule  magnesium hydroxide (MILK OF MAGNESIA) 400 MG/5ML suspension  magnesium oxide (MAG-OX) 400 MG tablet  melatonin 10 MG TABS tablet  multivitamin w/minerals (THERA-VIT-M) tablet  sulfamethoxazole-trimethoprim (BACTRIM DS) 800-160 MG tablet  warfarin ANTICOAGULANT (COUMADIN) 2.5 MG tablet      Allergies   Allergen Reactions    Brilinta [Ticagrelor] Other (See Comments) and Difficulty breathing     Per pt and spouse, hyperventilation.    Clonazepam Other (See Comments)     Per spouse, acted like a zombie and he was shaky, could barely talk.     Family History  Family History   Problem Relation Age of Onset    Other Cancer Mother     Obesity Mother     GI problems Father     Uterine Cancer Sister      Social History   Social History     Tobacco Use    Smoking status: Former     Current packs/day: 0.00     Average packs/day: 0.3 packs/day for 30.0 years (7.5 ttl pk-yrs)     Types: Cigarettes     Start date: 10/26/1993     Quit date: 10/26/2023     Years since quittin.7     Passive exposure: Past    Smokeless tobacco: Never    Tobacco comments:     Quit 10/26/2023   Vaping Use    Vaping status: Never Used   Substance Use Topics    Alcohol use: Yes     Comment: 1-2 beers per day    Drug use: No      A medically  appropriate review of systems was performed with pertinent positives and negatives noted in the HPI, and all other systems negative.    Physical Exam      Physical Exam  Vitals and nursing note reviewed.   Constitutional:       General: He is not in acute distress.     Appearance: Normal appearance. He is not toxic-appearing.   HENT:      Head: Atraumatic.   Eyes:      General: No scleral icterus.     Conjunctiva/sclera: Conjunctivae normal.   Cardiovascular:      Rate and Rhythm: Normal rate.      Comments: LVAD buzz  Pulmonary:      Effort: Pulmonary effort is normal. No respiratory distress.      Breath sounds: Normal breath sounds.   Abdominal:      Palpations: Abdomen is soft.      Tenderness: There is no abdominal tenderness.      Comments: LVAD does not appear infected    Musculoskeletal:         General: No deformity.      Cervical back: Neck supple.   Skin:     General: Skin is warm.   Neurological:      Mental Status: He is alert.           ED Course, Procedures, & Data      Procedures                  EKG Interpretation:      Interpreted by Ventura Bethea MD  Symptoms at time of EKG: Generalized weakness  Rhythm: Sinus rhythm  Rate: 64 bpm  Axis: Normal  Ectopy: Right ventricular hypertrophy  Conduction: Nonspecific interventricular conduction block and 1st degree AV block  ST Segments/ T Waves: No ST-T wave changes and No acute ischemic changes  Q Waves: None  Comparison to prior: Unchanged    Clinical Impression: Sinus rhythm with first-degree AV block with no acute ischemic changes        Medications - No data to display       Critical care was not performed.     Medical Decision Making  The patient's presentation was of high complexity (an acute health issue posing potential threat to life or bodily function).    The patient's evaluation involved:  review of external note(s) from 3+ sources (see separate area of note for details)  review of 3+ test result(s) ordered prior to this encounter (see  separate area of note for details)  ordering and/or review of 3+ test(s) in this encounter (see separate area of note for details)  discussion of management or test interpretation with another health professional (cardiology)    The patient's management necessitated high risk (a decision regarding hospitalization).    Assessment & Plan    Patient came here for nonspecific symptoms, dizziness and weakness and labs as reviewed and interpreted by me are significant for a hyponatremia to 128, chronic anemia to 9.0, otherwise reassuring  Reviewed first EKG with cardiology as there is concern for possible STEMI on the read, but I did not see a STEMI and neither did cardiology  Chest x-ray read as nonacute  Discussed case with cardiology attending Dr. Laurent who accepted patient for admission to their service.    I have reviewed the nursing notes. I have reviewed the findings, diagnosis, plan and need for follow up with the patient.    New Prescriptions    No medications on file       Final diagnoses:   None   IChelsey, am serving as a trained medical scribe to document services personally performed by Ventura Bethea MD, based on the provider's statements to me.     IVentura MD, was physically present and have reviewed and verified the accuracy of this note documented by Chelsey Szymanski.     Ventura Bethea MD  [facility] EMERGENCY DEPARTMENT  7/14/2025     Ventura Bethea MD  07/14/25 [id number]

## 2025-07-14 NOTE — CONFIDENTIAL NOTE
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

## 2025-07-14 NOTE — MEDICATION SCRIBE - ADMISSION MEDICATION HISTORY
Medication Scribe Admission Medication History    Admission medication history is complete. The information provided in this note is only as accurate as the sources available at the time of the update.    Information Source(s): Patient, Hospital records, and Spouse via in-person    Pertinent Information: Spoke with patient in person and completed medication hx. Patient spouse knew patient medications. Patient spouse states that patient did take AM meds this morning but vomited them up about 10-15 minutes after taking them.   Patient spouse did verify patient warfarin dose as well and writer was able to confirm dose from INR Anticoagulation note on 07/07/2025. Changed dose on patient medication list was well. Dose as of that date was 2.5 PO Q Mon and Thur; 1.25 mg by mouth all other days. Next INR check was due on 07/11/2025. Could not find note from that date that would indicate any new dose or dose change.     Changes made to PTA medication list:  Added: None  Deleted: None  Changed: Warfarin 2.5 MG Tab: Take 0.5 tablets (1.25 mg) by mouth daily. >>> Warfarin 2.5 MG Tab: Take 2.5 mg by mouth in the morning Q Mon and Thurs. 1.25 mg by mouth ALL OTHER DAYS.    Allergies reviewed with patient and updates made in EHR: no    Medication History Completed By: Octavia Simpson 7/14/2025 2:45 PM    PTA Med List   Medication Sig Last Dose/Taking    acetaminophen (TYLENOL) 325 MG tablet Take 2 tablets (650 mg) by mouth every 4 hours as needed for other (For optimal non-opioid multimodal pain management to improve pain control.). More than a month    amiodarone (PACERONE) 200 MG tablet Take 1 tablet (200 mg) by mouth daily. 7/14/2025 Morning    amLODIPine (NORVASC) 10 MG tablet Take 0.5 tablets (5 mg) by mouth daily. 7/13/2025 Evening    atorvastatin (LIPITOR) 80 MG tablet Take 1 tablet (80 mg) by mouth every evening. 7/13/2025 Evening    digoxin (LANOXIN) 125 MCG tablet Take 0.5 tablets (62.5 mcg) by mouth daily. 7/13/2025     escitalopram (LEXAPRO) 10 MG tablet Take 1 tablet (10 mg) by mouth or Feeding Tube daily. 7/13/2025    gabapentin (NEURONTIN) 100 MG capsule Take 1 capsule (100 mg) by mouth or Feeding Tube at bedtime. 7/13/2025 Bedtime    magnesium oxide (MAG-OX) 400 MG tablet Take 1 tablet (400 mg) by mouth daily. 7/13/2025 Evening    melatonin 10 MG TABS tablet Take 1 tablet (10 mg) by mouth every evening. 7/13/2025 Evening    multivitamin w/minerals (THERA-VIT-M) tablet Take 1 tablet by mouth daily. 7/13/2025    sulfamethoxazole-trimethoprim (BACTRIM DS) 800-160 MG tablet Take 1 tablet by mouth 2 times daily. 7/13/2025 at  8:00 PM    warfarin ANTICOAGULANT (COUMADIN) 2.5 MG tablet Take 2.5 mg by mouth in the morning, Mon and Thur. 1.25 mg by mouth ALL OTHER DAYS. 7/14/2025 Morning

## 2025-07-14 NOTE — PROGRESS NOTES
ANTICOAGULATION MANAGEMENT     Duane C Johnson 75 year old male is on warfarin with subtherapeutic INR result. (Goal INR 2.0-3.0)    Recent labs: (last 7 days)     07/14/25  1131   INR 1.52*       ASSESSMENT     Source(s): Chart Review  Previous INR was Subtherapeutic  Medication, diet, health changes since last INR: chart reviewed      Pt had ED to hospital admission today for weakness, lightheadedness          PLAN     Recommended plan for temporary change(s) affecting INR     Dosing Instructions: Pt will have warfarin managed by inpatient team, [facility] will resume management at discharge.       Dexter Squires RN  7/14/2025  Anticoagulation Clinic  [employer] for routing messages: p ANTICOAG LVAD  [facility] patient phone line: [phone]        _______________________________________________________________________     Anticoagulation Episode Summary       Current INR goal:  2.0-3.0   TTR:  71.4% (7.9 mo)   Target end date:  Indefinite   Send INR reminders to:  ANTICOAG LVAD    Indications    Chronic systolic congestive heart failure (H) [I50.22]  Anticoagulated on warfarin [Z79.01]  LVAD (left ventricular assist device) present (H) [Z95.811]  Other specified hypotension [I95.89]  Chronic combined systolic and diastolic heart failure (H) [I50.42]             Comments:  Follow VAD Anticoag protocol:Yes: HeartMate 3  Bridging: Enoxaparin  Date VAD placed: 9/18/24             Anticoagulation Care Providers       Provider Role Specialty Phone number    Ham Cruz MD Referring Advanced Heart Failure and Transplant Cardiology [phone]    Sravanthi Asencio MD Referring Cardiovascular Disease [phone]

## 2025-07-15 ENCOUNTER — PATIENT OUTREACH (OUTPATIENT)
Dept: CARE COORDINATION | Facility: CLINIC | Age: 75
End: 2025-07-15

## 2025-07-15 ENCOUNTER — APPOINTMENT (OUTPATIENT)
Dept: OCCUPATIONAL THERAPY | Facility: CLINIC | Age: 75
DRG: 315 | End: 2025-07-15
Attending: NURSE PRACTITIONER
Payer: MEDICARE

## 2025-07-15 LAB
ANION GAP SERPL CALCULATED.3IONS-SCNC: 11 MMOL/L (ref 7–15)
ATRIAL RATE - MUSE: 63 BPM
BACTERIA WND CULT: ABNORMAL
BUN SERPL-MCNC: 15.4 MG/DL (ref 8–23)
CALCIUM SERPL-MCNC: 8.5 MG/DL (ref 8.8–10.4)
CHLORIDE SERPL-SCNC: 95 MMOL/L (ref 98–107)
CREAT SERPL-MCNC: 1.21 MG/DL (ref 0.67–1.17)
DIASTOLIC BLOOD PRESSURE - MUSE: NORMAL MMHG
EGFRCR SERPLBLD CKD-EPI 2021: 62 ML/MIN/1.73M2
ERYTHROCYTE [DISTWIDTH] IN BLOOD BY AUTOMATED COUNT: 15.3 % (ref 10–15)
GLUCOSE SERPL-MCNC: 96 MG/DL (ref 70–99)
GRAM STAIN RESULT: ABNORMAL
GRAM STAIN RESULT: ABNORMAL
HCO3 SERPL-SCNC: 21 MMOL/L (ref 22–29)
HCT VFR BLD AUTO: 26.2 % (ref 40–53)
HGB BLD-MCNC: 8.7 G/DL (ref 13.3–17.7)
INR PPP: 1.74 (ref 0.85–1.15)
INTERPRETATION ECG - MUSE: NORMAL
MCH RBC QN AUTO: 30.2 PG (ref 26.5–33)
MCHC RBC AUTO-ENTMCNC: 33.2 G/DL (ref 31.5–36.5)
MCV RBC AUTO: 91 FL (ref 78–100)
P AXIS - MUSE: 25 DEGREES
PLATELET # BLD AUTO: 175 10E3/UL (ref 150–450)
POTASSIUM SERPL-SCNC: 4.3 MMOL/L (ref 3.4–5.3)
PR INTERVAL - MUSE: 254 MS
PROTHROMBIN TIME: 20.6 SECONDS (ref 11.8–14.8)
QRS DURATION - MUSE: 116 MS
QT - MUSE: 398 MS
QTC - MUSE: 407 MS
R AXIS - MUSE: 222 DEGREES
RBC # BLD AUTO: 2.88 10E6/UL (ref 4.4–5.9)
SODIUM SERPL-SCNC: 127 MMOL/L (ref 135–145)
SYSTOLIC BLOOD PRESSURE - MUSE: NORMAL MMHG
T AXIS - MUSE: -15 DEGREES
VENTRICULAR RATE- MUSE: 63 BPM
WBC # BLD AUTO: 4.1 10E3/UL (ref 4–11)

## 2025-07-15 PROCEDURE — 250N000013 HC RX MED GY IP 250 OP 250 PS 637: Performed by: INTERNAL MEDICINE

## 2025-07-15 PROCEDURE — 250N000011 HC RX IP 250 OP 636: Performed by: INTERNAL MEDICINE

## 2025-07-15 PROCEDURE — 36415 COLL VENOUS BLD VENIPUNCTURE: CPT | Performed by: NURSE PRACTITIONER

## 2025-07-15 PROCEDURE — 85610 PROTHROMBIN TIME: CPT | Performed by: NURSE PRACTITIONER

## 2025-07-15 PROCEDURE — 258N000003 HC RX IP 258 OP 636: Performed by: INTERNAL MEDICINE

## 2025-07-15 PROCEDURE — 99207 PR NO CHARGE LOS: CPT | Performed by: INTERNAL MEDICINE

## 2025-07-15 PROCEDURE — 97165 OT EVAL LOW COMPLEX 30 MIN: CPT | Mod: GO | Performed by: OCCUPATIONAL THERAPIST

## 2025-07-15 PROCEDURE — 93750 INTERROGATION VAD IN PERSON: CPT

## 2025-07-15 PROCEDURE — 80048 BASIC METABOLIC PNL TOTAL CA: CPT | Performed by: NURSE PRACTITIONER

## 2025-07-15 PROCEDURE — 250N000013 HC RX MED GY IP 250 OP 250 PS 637: Performed by: NURSE PRACTITIONER

## 2025-07-15 PROCEDURE — 99223 1ST HOSP IP/OBS HIGH 75: CPT | Mod: GC | Performed by: INTERNAL MEDICINE

## 2025-07-15 PROCEDURE — 120N000003 HC R&B IMCU UMMC

## 2025-07-15 PROCEDURE — 97535 SELF CARE MNGMENT TRAINING: CPT | Mod: GO | Performed by: OCCUPATIONAL THERAPIST

## 2025-07-15 PROCEDURE — G0545 PR INHRENT VISIT TO INPT/OBS W CNFRM/SUSPCT INFCT DIS BY INFCT DIS SPCIALST: HCPCS | Performed by: INTERNAL MEDICINE

## 2025-07-15 PROCEDURE — 99233 SBSQ HOSP IP/OBS HIGH 50: CPT | Performed by: PHYSICIAN ASSISTANT

## 2025-07-15 PROCEDURE — 999N000147 HC STATISTIC PT IP EVAL DEFER: Performed by: REHABILITATION PRACTITIONER

## 2025-07-15 PROCEDURE — 85018 HEMOGLOBIN: CPT | Performed by: NURSE PRACTITIONER

## 2025-07-15 RX ORDER — CEFAZOLIN SODIUM 1 G/50ML
1750 SOLUTION INTRAVENOUS ONCE
Status: COMPLETED | OUTPATIENT
Start: 2025-07-15 | End: 2025-07-15

## 2025-07-15 RX ORDER — WARFARIN SODIUM 2.5 MG/1
2.5 TABLET ORAL
Status: COMPLETED | OUTPATIENT
Start: 2025-07-15 | End: 2025-07-15

## 2025-07-15 RX ADMIN — AMLODIPINE BESYLATE 5 MG: 5 TABLET ORAL at 19:54

## 2025-07-15 RX ADMIN — AMIODARONE HYDROCHLORIDE 200 MG: 200 TABLET ORAL at 09:56

## 2025-07-15 RX ADMIN — Medication 10 MG: at 19:54

## 2025-07-15 RX ADMIN — GABAPENTIN 100 MG: 100 CAPSULE ORAL at 21:46

## 2025-07-15 RX ADMIN — MAGNESIUM OXIDE TAB 400 MG (241.3 MG ELEMENTAL MG) 400 MG: 400 (241.3 MG) TAB at 19:54

## 2025-07-15 RX ADMIN — Medication 1 TABLET: at 09:55

## 2025-07-15 RX ADMIN — ATORVASTATIN CALCIUM 80 MG: 80 TABLET, FILM COATED ORAL at 19:54

## 2025-07-15 RX ADMIN — ESCITALOPRAM OXALATE 10 MG: 10 TABLET ORAL at 09:56

## 2025-07-15 RX ADMIN — DIGOXIN 62.5 MCG: 0.06 TABLET ORAL at 14:13

## 2025-07-15 RX ADMIN — PSYLLIUM HUSK 1 PACKET: 3.4 POWDER ORAL at 09:55

## 2025-07-15 RX ADMIN — VANCOMYCIN HYDROCHLORIDE 1750 MG: 1 INJECTION, POWDER, LYOPHILIZED, FOR SOLUTION INTRAVENOUS at 11:21

## 2025-07-15 RX ADMIN — WARFARIN SODIUM 2.5 MG: 2.5 TABLET ORAL at 19:54

## 2025-07-15 ASSESSMENT — ACTIVITIES OF DAILY LIVING (ADL)
ADLS_ACUITY_SCORE: 42
ADLS_ACUITY_SCORE: 59
ADLS_ACUITY_SCORE: 42
ADLS_ACUITY_SCORE: 59
ADLS_ACUITY_SCORE: 42
ADLS_ACUITY_SCORE: 39
DEPENDENT_IADLS:: INDEPENDENT
ADLS_ACUITY_SCORE: 59
ADLS_ACUITY_SCORE: 39
ADLS_ACUITY_SCORE: 42
ADLS_ACUITY_SCORE: 42
ADLS_ACUITY_SCORE: 39
ADLS_ACUITY_SCORE: 42
ADLS_ACUITY_SCORE: 42
ADLS_ACUITY_SCORE: 44
ADLS_ACUITY_SCORE: 42
ADLS_ACUITY_SCORE: 44
ADLS_ACUITY_SCORE: 42
ADLS_ACUITY_SCORE: 42

## 2025-07-15 NOTE — PLAN OF CARE
Goal Outcome Evaluation:    Admission   Diagnosis: Driveline infection  Admitted from: [facility]   Via: stretcher   Accompanied by: self  Belongings: Placed in closet  Admission paperwork: completed   Teaching: Call don't fall, use of console, meal times, visiting hours, orientation to unit, when to call for the RN, and the importance of safety.   Access: R PIV x 2- SL  Telemetry:Placed on pt   Ht./Wt.: Complete   Two nurse head-to-toe skin assessment performed by Leigh RN and Jc RN.  No skin issues noted.  See PCS for assessment and treatment of wounds and surgical sites.    Neuro: A&Ox4.   Cardiac: Afebrile, VSS. SR w/ 1AVB . Maps: 80's  Respiratory: RA  GI/: Voiding spontaneously w/ urinal at bedside. No BM this shift.  Diet/appetite: Tolerating mech and soft diet. Denies nausea.  Activity: Independent.   Pain: Denies   Skin: No new deficits noted.  Lines: 2 PIV - SL

## 2025-07-15 NOTE — PHARMACY-VANCOMYCIN DOSING SERVICE
Pharmacy Vancomycin Initial Note  Date of Service July 15, 2025  Patient's  1950  75 year old, male with history of oral antibiotics treating driveline drainage, presenting with increased weakness, dizziness, episodes of emesis, and ongoing driveline drainage    Indication: Skin and Soft Tissue Infection - driveline infection     Current estimated CrCl = Estimated Creatinine Clearance: 57.4 mL/min (A) (based on SCr of 1.21 mg/dL (H)).    Creatinine for last 3 days  2025: 11:31 AM Creatinine 1.13 mg/dL  7/15/2025:  5:54 AM Creatinine 1.21 mg/dL    Nephrotoxins and other renal medications (From now, onward)      None            Contrast Orders - past 72 hours (72h ago, onward)      Start     Dose/Rate Route Frequency Stop    25 1455  iopamidol (ISOVUE-370) solution 104 mL         104 mL Intravenous ONCE 25 1503            Insight[employer] Prediction of Planned Initial Vancomycin Regimen  Loading dose: 1750 mg at 09:00 07/15/2025.  Regimen: 1250 mg IV every 24 hours.  Start time: 09:00 on 2025  Exposure target: AUC24 (range) 400-600 mg/L.hr   AUC24,ss: 500 mg/L.hr  Probability of AUC24 > 400: 72 %  Ctrough,ss: 15 mg/L  Probability of Ctrough,ss > 20: 28 %  Probability of nephrotoxicity (Lodise KARLA ): 10 %          Plan:  Start vancomycin  1750 mg IV ONCE as a loading dose.  Start vancomycin 1250 mg every 24 hour on  @1000 if renal function stays stable  Vancomycin monitoring method: AUC  Vancomycin therapeutic monitoring goal: 400-600 mg*h/L  Pharmacy will check vancomycin levels as appropriate in 1-3 Days.    Serum creatinine levels will be ordered daily for the first week of therapy and at least twice weekly for subsequent weeks.      Hermelinda Carranza, PharmD  PGY2 Pharmacy Resident

## 2025-07-15 NOTE — PROGRESS NOTES
"   07/15/25 0907   Appointment Info   Signing Clinician's Name / Credentials (OT) Jimena Santa,OTR/L   Rehab Comments (OT) LVAD   Living Environment   People in Home spouse   Current Living Arrangements house   Home Accessibility stairs to enter home;stairs within home   Number of Stairs, Main Entrance 3   Number of Stairs, Within Home, Primary greater than 10 stairs;other (see comments)  (chair lift)   Transportation Anticipated family or friend will provide   Living Environment Comments Pt lives at home with his wife. He has stairs to enter the home and a stair lift within the home. His bedroom is upstairs and his kitchen and living room are on the main floor. He reports he has a walk-in shower with a built-in shower chair and grab bars. He reports using a walker with a seat.   Self-Care   Usual Activity Tolerance good   Current Activity Tolerance moderate   Equipment Currently Used at Home walker, rolling   Fall history within last six months yes   Number of times patient has fallen within last six months   (\"a couple\")   Activity/Exercise/Self-Care Comment pt. reports being indep. with BADLs, amb. with and without his 4WW at baseline   Instrumental Activities of Daily Living (IADL)   IADL [place], he does cooking and yardwork, but reports they can have help. , wife assists with laundry, shopping, med management and finances.   General Information   Onset of Illness/Injury or Date of Surgery 07/14/25   Referring Physician Sirisha Arias, APRN CNP   Patient/Family Therapy Goal Statement (OT) to get home   Additional Occupational Profile Info/Pertinent History of Current Problem per chart \"75 year old male with a history of heart failure with an LVAD in place, HTN, CAD, STEMI, ventricular fibrillation who presents to the emergency department with generalized weakness.  Patient reports that he has been experiencing progressively worsening weakness send lethargy for the past week or two.  He has " "also been experiencing hot flashes.  He had an episode of emesis this past Friday and one today as well.  He states that he was told by his LVAD coordinator to present to the ED this past Friday, but he was unable to due to severe weakness.  Patient's partner reports that he has also been told that his provider was concerned for an infection near the site of his left lower abdomen.  His partner states that she has noticed brown discharge around this area.\"   Existing Precautions/Restrictions other (see comments)  (LVAD)   General Observations and Info act. orders;  amb.   Cognitive Status Examination   Orientation Status orientation to person, place and time   Cognitive Status Comments pt. somewhat impulsive, likely baseline pt. aware stating \"sometimes I just move too fast\"   Visual Perception   Visual Impairment/Limitations WFL   Pain Assessment   Patient Currently in Pain No   Range of Motion Comprehensive   General Range of Motion no range of motion deficits identified   Strength Comprehensive (MMT)   Comment, General Manual Muscle Testing (MMT) Assessment general weakness/deconditioning   Bathing Assessment/Intervention   Desha Level (Bathing) contact guard assist   Lower Body Dressing Assessment/Training   Desha Level (Lower Body Dressing) set up   Grooming Assessment/Training   Desha Level (Grooming) set up;supervision   Toileting   Desha Level (Toileting) contact guard assist   Clinical Impression   Criteria for Skilled Therapeutic Interventions Met (OT) Yes, treatment indicated   OT Diagnosis weakness   Influenced by the following impairments medical status, deconditioning   OT Problem List-Impairments impacting ADL activity tolerance impaired;strength   ADL comments/analysis below baseline with IADLS, funct. endurance   Assessment of Occupational Performance 3-5 Performance Deficits   Identified Performance Deficits dressing, toileting, home mgmt.   Planned Therapy Interventions " (OT) ADL retraining;IADL retraining;strengthening;home program guidelines;progressive activity/exercise   Clinical Decision Making Complexity (OT) problem focused assessment/low complexity   Risk & Benefits of therapy have been explained evaluation/treatment results reviewed;care plan/treatment goals reviewed;risks/benefits reviewed;current/potential barriers reviewed;participants voiced agreement with care plan;participants included;patient   OT Total Evaluation Time   OT Eval, Low Complexity Minutes (30492) 8   OT Goals   Therapy Frequency (OT) 5 times/week   OT Predicted Duration/Target Date for Goal Attainment 07/22/25   OT Goals Lower Body Dressing;Toilet Transfer/Toileting;Home Management;Aerobic Activity   OT: Lower Body Dressing Independent;Modified independent;including set-up/clothing retrieval   OT: Toilet Transfer/Toileting Independent;Modified independent;toilet transfer;cleaning and garment management   OT: Home Management with light demand household tasks;ambulatory level   OT: Perform aerobic activity with stable cardiovascular response intermittent activity;25 minutes   OT Discharge Planning   OT Plan OT/CR PLAN: hallway amb., stairs, end/str. HEP, may only be 1-2 more sessions   OT Discharge Recommendation (DC Rec) home with assist   OT Rationale for DC Rec pt. doing well, has good home support. anticipate pt. will d/c home with prev. A.   OT Brief overview of current status CGA/S wiht BADLs and functional mob. amb. short distance without AD, longer with FWW or 4WW   Total Session Time   Total Session Time (sum of timed and untimed services) 8

## 2025-07-15 NOTE — PROGRESS NOTES
D: Stopped by to see patient. Pt sleeping.  P: Continue to follow patient and address any questions or concerns patient and or caregiver may have.      Indication of Interrogation:  Other    Type of VAD:  Heartmate 3    Current Parameters:  Flow= 3.1 lpm, Speed= 5000 rpm, Power= 3.1 jackson, PI (if applicable)= 4.3    Abnormal Alarm on History:  No    Abnormal Events/Parameters Notes:  Yes, explain: Occ PI events.  PI 2.2-8.4.  Few speed drops noted.  Hx goes back 72hrs.    Changes Made during Interrogation:  No     I have reviewed today's vital signs, notes, medications, labs and imaging.  I have also seen and examined the patient and agree with the findings and plan as outlined above.   Cristobal Laurent MD, PhD  Professor, Heart Failure and Cardiac Transplantation  [facility]

## 2025-07-15 NOTE — PROGRESS NOTES
Physical Therapy: Orders received. Chart reviewed and discussed with care team.? Physical Therapy not indicated due to patient is mobilizing well, no balance deficits, anticipate pt will continue to participate in OT/CR while inpatient.? Defer discharge recommendations to OT.? Will complete orders.

## 2025-07-15 NOTE — PROGRESS NOTES
[facility]   Cardiology II Service / Advanced Heart Failure  Daily Progress Note      Patient: Duane C Johnson  MRN: [id number]  Admission Date: 7/14/2025  Hospital Day # 1    Assessment and Plan: Duane C Johnson is a 75 year old male with a past medical history including CAD with history of anterior STEMI s/p PCI to the pLAD on 10/26/23 with a VT/VF arrest the next day (10/27), multiple subsequent admissions for HF exacerbation, with HFrEF 2/2 ICM s/p HM3 LVAD c/b frequent low atilio alarms s/p low-flow controller. He was recently started on oral antibiotics for driveline infection and is now presenting with increased weakness, dizziness, episodes of emesis, and ongoing driveline drainage.     Today's Plan:  - Stop bactrim  - Start IV vanc, pharmacy to dose  - CVTS consult to consider stitch on driveline  - ID consult    # LVAD driveline infection  Seen in clinic 7/2/25 with reports of ongoing driveline drainage x 1 month. Driveline cultures + corynebacterium striatum. Started on doxy and then coverage narrowed to Bactrim DS BID with plan for 2 week course through ~7/21.  CT abdomen/pelvis with contrast with some fat stranding but no fluid collection  - ID consult   - CVTS consult for lack of driveline seal, increased infection risk, eval for utility of driveline suture    - repeat driveline cx pending  - Stop bactrim  - Start IV vancomycin, dose by pharmacy  - daily LVAD dressing changes      # Chronic systolic heart failure secondary to ICM s/p HM3 LVAD as DT on 9/18/24.    Stage C. NYHA Class III.     Fluid status euvolemic- doing well with aggressive fluid and salt intake, not on a loop diuretic. No fluid restriction  ACEi/ARB: none given frequent hypotension  Afterload- continue PTA amlodipine 5 mg daily  BB: none given frequent hypotension   Aldosterone antagonist: avoiding d/t frequent dehydration  SGLT2i: avoiding d/t frequent dehydration  SCD prophylaxis ICD  NSAID use: contraindicated  BP:  MAP goal 65-85  LDH trends: 194, stable  Anticoagulation: warfarin, INR goal 2.0-2.5 given periodic rectal bleeding with radiation proctitis. Today's INR 1.75, dosing per pharmacy. Defer bridge.   Antiplatelet: N/A  Other: continue mag ox 400 mg daily     # Low flow alarms, resolved  # Labile MAPs   # Suspected orthostatic hypotension, resolved  Low flows seems releated to hypertension and some hypovolemia as well. Have improved with re-timing hydralazine and after increasing oral fluids. CTA was done on  10/17/24 with no outflow graft ostruction. The outflow graft does make an acute angle as it approaches the aorta. Dr. Smith has reviewed inflow and outflow positioning, nothing to do . Speed was dropped to 5000 in oct 2024 which helped as well as stopping losartan and sglt2i as above.   - Speed set at 5000- decreased speed in Oct 2024 helped low-flow alarms  - Off all medications with a diuretic effect  - Other BP med management as above  - Previously stopped fludrocortisone 0.1 mg daily   - Has a LOW FLOW controller, will not alarm until flow is </= 2  - Continue to push fluids, including gatorade, salty snacks, etc      # History of VT/VF arrest 2023 2/2 STEMI  # AFib s/p DCCV 9/2024 and again 9/30/2024   Has had multiple attempted cardioversions. Eventually planned for just rate control, so no further cardioversions were planned, but now back in sinus.   - ICD 6/27/25 without any atrial fibrillation since the last icd check, continue checks per protocol   - Continue amiodarone 200 mg daily  - Continue digoxin 62.5 mcg daily   - AC as above     # CAD w/h/o anterior STEMI s/p pLAD stent in 2023  - On coumadin, no asa given on coumadin  - BB deferred as above  - Continue atorvastatin 80 mg daily     # Dental concerns. Has no teeth. Needs dentures or alternative planning but has been told that he cannot have that until his abnormal bone growith along lower jaw is addressed. He has seen local dentists and oral  surgeons but has been told his complexity is above their clinics ability  - Referred to oral surgery at [facility] at discharge      # Rectal bleeding  # Acute blood loss anemia  Has had episodes of rectal bleeding, none within the last 2 weeks. He has seen GI and colorectal surgery twice. Has been told this is due to his radiation proctitis. Hgb has dropped in the last 2 months, presently stable ~ 9.  - Daily CBC for now, consider spacing out  - Recent iron studies iron 30, iron sat 13. He would benefit from IV iron, however will defer until driveline infection improving and near completion of treatment     # Cognitive changes  - Needs formal driving assessment with DMV or OT prior to being cleared to drive, wife aware and also discussed with patient at time of discharge- we discussed this again today. Would defer for at least another 3 months with ongoing medical issues  - Repeat neuropsych testing in 1 year post discharge from index admission  - Referred to behavioral neurology in place     # Inguinal hernia, can self-reduce. No pain, but irritating and uncomfortable. Recently referred to general surgery, was scheduled to see in clinic on 7/14, but will need to reschedule due to hospital admission.      FEN: regular  PROPHY:  warfarin   LINES:  PIV  DISPO:  2-5 days   CODE STATUS:  Full Code    Torrie Forrest PA-C  Advanced Heart Failure/Cardiology II Service  Vocpanfilo preferred, or pager [phone]      Patient discussed with Dr. Laurent.        65 minutes spent on the date of the encounter doing chart review, history and exam, documentation and further activities per the note    ================================================================    Subjective/24-Hr Events:   Last 24 hr care team notes reviewed. He is feeling much better. No lightheadedness. No low flow alarms. No more nausea. No swelling.     ROS:  4 point ROS including respiratory, CV, GI and  (other than that noted in the HPI) is negative.  "    Medications: Reviewed in EPIC.     Physical Exam:   BP 93/81 (BP Location: Left arm)   Pulse 68   Temp 98.1  F (36.7  C) (Oral)   Resp 18   Ht 1.702 m (5' 7\")   Wt 77 kg (169 lb 11.2 oz)   SpO2 98%   BMI 26.58 kg/m      GENERAL: Appears comfortable, in no distress .  HEENT: Eye symmetrical, no discharge or icterus bilaterally. ple, JVD <6.   CV: RRR, +S1S2, no murmur, rub, or gallop.   RESPIRATORY: Respirations regular, even, and unlabored. Lungs CTA throughout.    GI: Soft and non distended   EXTREMITIES: No peripheral edema. All extremities are warm ane well perfused  NEUROLOGIC: Alert and interacting appropriatlye  SKIN: No jaundice. No rashes or lesions.  Drivelien  dressing c/d/i    Labs:  CMP  Recent Labs   Lab 07/15/25  0554 07/14/25  1131   * 128*   POTASSIUM 4.3 4.7   CHLORIDE 95* 95*   CO2 21* 21*   ANIONGAP 11 12   GLC 96 108*   BUN 15.4 15.4   CR 1.21* 1.13   GFRESTIMATED 62 68   PRANAV 8.5* 9.0   PROTTOTAL  --  7.1   ALBUMIN  --  4.0   BILITOTAL  --  0.3   ALKPHOS  --  109   AST  --  79*   ALT  --  64       CBC  Recent Labs   Lab 07/15/25  0554 07/14/25  1131   WBC 4.1 4.2   RBC 2.88* 3.02*   HGB 8.7* 9.0*   HCT 26.2* 27.3*   MCV 91 90   MCH 30.2 29.8   MCHC 33.2 33.0   RDW 15.3* 15.5*    213       INR  Recent Labs   Lab 07/15/25  0554 07/14/25  1131 07/11/25  1128   INR 1.74* 1.52* 1.75*                 "

## 2025-07-15 NOTE — CONSULTS
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    Stage C. NYHA Class III.  # Hyponatremia  # Labile MAPs   # History of VT/VF arrest 2023 2/2 STEMI  # AFib s/p DCCV 9/2024 and again 9/30/2024    # CAD w/h/o anterior STEMI s/p pLAD stent in 2023  # Dental concerns-edentulous   # Rectal bleeding  # Acute blood loss anemia  # Cognitive changes  # Inguinal hernia    Recommendations:  Continue IV vancomycin, target -600.    Discussion:  Duane C Johnson is a 75 year old male with a past medical history including CAD with history of anterior STEMI s/p PCI to the pLAD on 10/26/23 with a VT/VF arrest the next day (10/27), multiple subsequent admissions for HF exacerbation, with HFrEF 2/2 ICM s/p HM3 LVAD c/b frequent low atilio alarms s/p low-flow controller who presents after being transitioned to PO doxycycline and then PO bactrim and with ongoing concerns for driveline infection. He was found to have Cornybacterium striatium which was resistent to both antibiotics and so patient was started on IV vancomycin. On exam patient actually has improvement in his erythema and drainage from his site despite poor coverage from doxycycline and bactrim. Patient was started on vancomycin which pathogen was susceptible to.    Overall agree this is a first time driveline infection with surrounding cellulitis (given imaging findings, culture results and pictures in chart), that has been ongoing for approximately one month, this is likely exacerbated/induced by the amount of movement from the driveline and so agree with the possibility of suturing  this in place.    Vancomycin is a good choice, there are not good oral options to treat this infection, attempted to add on linezolid susceptibility to the sample, however the microbiology lab was unable to do this.     I have seen and discussed the patient with Dr Sarah who agrees with the plan.    Raymond Sutton MD, PhD  Internal Medicine and Pediatrics, PGY-4    Physician Attestation   I personally examined and evaluated this patient.  I discussed the patient with the resident/fellow and care team, and agree with the assessment and plan of care as documented in the note.     Regalado findings:   Duane C Johnson is a 75 year old male with driveline infection evidence by purulence and redness at site. We will recommend vancomycin at the moment.    Frank Sarah MD  Date of Service (when I saw the patient): 07/15/25    MDM: > 3 notes and tests reviewed, life threatening illness, discussed with primary team.     _________________________________________________________________    HPI:  Duane C Johnson is a 75 year old male with a past medical history including CAD with history of anterior STEMI s/p PCI to the pLAD on 10/26/23 with a VT/VF arrest the next day (10/27), multiple subsequent admissions for HF exacerbation, with HFrEF 2/2 ICM s/p HM3 LVAD c/b frequent low flow alarms s/p low-flow controller who presents with likely LVAD driveline infection and systemic symptoms including dizziness, emesis and weakness as well as ongoing drainage from the site. The LVAD was placed on  9/18/2024 and has not had a driveline infection prior. He was started on doxycyline on 7/2 and transitioned to bactrim on 7/7, however speciation came back with Cornybacterium striatum which is resistant to both agents.     Patient had a few episodes of emesis prior to coming in that he attributes to his antibiotics. He has had some dizziness and fatigue, though these have improved since hospitalization, he is not feeling dizzy  walking around. He has not had any worsening cough, fevers, headaches, mouth pain, chest pain, or rashes. He is currently not having symptoms and the tenderness around his driveline has improved.     Patient was able to walk around the unit today without symptoms.  Anti-infectives:  Current: Vancomycin (7/15 - *)  Past: Doxycyline 7/2-7/7, bactrim 7/7 - 7/15    Physical Examination:  Ranges for his vital signs:  Temp:  [97.3  F (36.3  C)-99.2  F (37.3  C)] 98.1  F (36.7  C)  Pulse:  [60-74] 64  Resp:  [16-18] 18  BP: (93)/(81) 93/81  Cuff Mean (mmHg):  [87] 87  SpO2:  [96 %-100 %] 98 %  Exam:  Constitutional: healthy, alert, and no distress  Cardiovascular: LVAD hum, appears well perfused  Respiratory: lungs are clear, normal wob on room air  Gastrointestinal: non-distended, non-tender, site appears dry and with minimal erythema around opening, mild brownish drainage noted on gauze  Skin: No rashes noted  Neurologic: no focal deficits noted    Microbiology:  Cultures  7/2 wound culture Cornybacterium striatum  7/15 wound culture -

## 2025-07-15 NOTE — PLAN OF CARE
Shift Events: Pt resting in bed with eye closed, easily arouses to voice.     Neuro: Alert & Oriented x4. Calm and cooperative. Denies any headache, dizziness, and lightheadedness.  Cardiac/Tele: Doppler MAPs 70 - 80s. LVAD #'s within defined limits - no alarms this shift. Denies any chest pain and palpitations. Afebrile.  Respiratory: Sats >95% on room air. Shortness of breath with movement but recovers quickly once settled.   GI/: Continent. Urinal at bedside. Denies any nausea.  Diet/Appetite: Mechanical soft, tolerating well.   Skin: No new deficits noted.   LDAs: Right AC PIV - saline locked. Right wrist PIV - saline locked.   Activity: Independent / standby assist with LVAD cord management.  Pain: Pt does not endorse any pain at this time.     P: Continue to monitor and notify team with changes.    Shift: 2300 - 0200

## 2025-07-15 NOTE — CONSULTS
"LVAD Care Management Initial Consult    General Information  Assessment completed with: Patient, VM-chart review,    Type of CM/SW Visit: Progression of Care    Patient is well known to writer following VAD implant 9/18/2024. Patient was last admitted in March.    Primary Care Provider verified and updated as needed: Yes   Readmission within the last 30 days: no previous admission in last 30 days      Reason for Consult:  (Elevated Risk Score)  Advance Care Planning: Advance Care Planning Reviewed: no concerns identified   Patient does not have HCD, however indicates his wife would be his individual to make decisions for him if needed.    Communication Assessment  Patient's communication style: spoken language (English or Bilingual)    Hearing Difficulty or Deaf: no   Wear Glasses or Blind: yes (\"readers\" per pt)    Cognitive  Cognitive/Neuro/Behavioral: WDL                      Living Environment:   People in home: spouse  Wife Bernard Torres  Current living Arrangements: house  - has three levels and 2 stair lifts to get to each level.  Able to return to prior arrangements: yes       Family/Social Support:  Care provided by: self, spouse/significant other  Provides care for: no one  Marital Status:   Support system: Samina Torres       Description of Support System: Supportive, Involved         Current Resources:   Patient receiving home care services: No        Community Resources: None  Equipment currently used at home: grab bar, toilet, grab bar, tub/shower, walker, rolling  Supplies currently used at home:      Employment/Financial:  Employment Status: retired        Financial Concerns: none   Referral to Financial Worker: No       Does the patient's insurance plan have a 3 day qualifying hospital stay waiver?  Yes     Which insurance plan 3 day waiver is available? [employer]    Will the waiver be used for post-acute placement? Undetermined at this time    Lifestyle & Psychosocial Needs:  Social Drivers of " Health     Food Insecurity: Low Risk  (7/15/2025)    Food Insecurity     Within the past 12 months, did you worry that your food would run out before you got money to buy more?: No     Within the past 12 months, did the food you bought just not last and you didn t have money to get more?: No   Depression: Not at risk (2/3/2025)    PHQ-2     PHQ-2 Score: 0   Housing Stability: Low Risk  (7/15/2025)    Housing Stability     Do you have housing? : Yes     Are you worried about losing your housing?: No   Tobacco Use: Medium Risk (7/7/2025)    Patient History     Smoking Tobacco Use: Former     Smokeless Tobacco Use: Never     Passive Exposure: Past   Financial Resource Strain: Low Risk  (7/15/2025)    Financial Resource Strain     Within the past 12 months, have you or your family members you live with been unable to get utilities (heat, electricity) when it was really needed?: No   Alcohol Use: Not on file   Transportation Needs: Low Risk  (7/15/2025)    Transportation Needs     Within the past 12 months, has lack of transportation kept you from medical appointments, getting your medicines, non-medical meetings or appointments, work, or from getting things that you need?: No   Physical Activity: Inactive (11/16/2023)    Exercise Vital Sign     Days of Exercise per Week: 0 days     Minutes of Exercise per Session: 0 min   Interpersonal Safety: High Risk (7/15/2025)    Interpersonal Safety     Do you feel physically and emotionally safe where you currently live?: No     Within the past 12 months, have you been hit, slapped, kicked or otherwise physically hurt by someone?: No     Within the past 12 months, have you been humiliated or emotionally abused in other ways by your partner or ex-partner?: No   Stress: Not on file   Social Connections: Unknown (11/16/2023)    Social Connection and Isolation Panel [NHANES]     Frequency of Communication with Friends and Family: Not on file     Frequency of Social Gatherings with  Friends and Family: Not on file     Attends Hindu Services: Not on file     Active Member of Clubs or Organizations: Not on file     Attends Club or Organization Meetings: Not on file     Marital Status:    Health Literacy: Not on file       Functional Status:  Prior to admission patient needed assistance:   Dependent ADLs:: Ambulation-walker  Dependent IADLs:: Independent       Mental Health Status:  Mental Health Status: No Current Concerns       Chemical Dependency Status:  Chemical Dependency Status: No Current Concerns             Values/Beliefs:  Spiritual, Cultural Beliefs, Hindu Practices, Values that affect care: no               Discussed  Partnership in Safe Discharge Planning  document with patient/family: No    Additional Information:  Patient well known to writer following LVAD implant on 9/18/2024. Patient was previously admitted in March of 2025. Patient presents this hospitalization with concern for increased weakness, dizziness, episodes of emesis, and ongoing driveline drainage.    Duane reports he has been increasingly tired within the last several weeks, sleeping close to 12 hours daily. He reports he has been doing well otherwise. He reports he recently won second place in a competition with his group ([employer]). Pt reports he has been adjusting well with the VAD and that he remains relatively independent at home.    Patient reports using a walker at home and grab bars throughout the house. He reports previously being in cardiac rehab, though this ended two months ago. Patient has not had any other home care or community resources.    Patient denied any concerns surrounding mental health, chemical dependency, or financials.    Patient has an unknown discharge date at this time. Pt not anticipated to have any discharge needs from . Will continue to follow for any that may arise.    Next Steps: Follow for any potential discharge needs.    MONTRELL Baig,  MARTIN  Heart Transplant/MCS   [facility] Acute Care Management  Phone: [phone]  Available on Retention Education

## 2025-07-16 ENCOUNTER — APPOINTMENT (OUTPATIENT)
Dept: GENERAL RADIOLOGY | Facility: CLINIC | Age: 75
DRG: 315 | End: 2025-07-16
Attending: PHYSICIAN ASSISTANT
Payer: MEDICARE

## 2025-07-16 ENCOUNTER — ENROLLMENT (OUTPATIENT)
Dept: HOME HEALTH SERVICES | Facility: HOME HEALTH | Age: 75
End: 2025-07-16
Payer: MEDICARE

## 2025-07-16 LAB
ANION GAP SERPL CALCULATED.3IONS-SCNC: 8 MMOL/L (ref 7–15)
BUN SERPL-MCNC: 12.8 MG/DL (ref 8–23)
CALCIUM SERPL-MCNC: 8.5 MG/DL (ref 8.8–10.4)
CHLORIDE SERPL-SCNC: 101 MMOL/L (ref 98–107)
CREAT SERPL-MCNC: 1.11 MG/DL (ref 0.67–1.17)
EGFRCR SERPLBLD CKD-EPI 2021: 69 ML/MIN/1.73M2
ERYTHROCYTE [DISTWIDTH] IN BLOOD BY AUTOMATED COUNT: 15.6 % (ref 10–15)
GLUCOSE SERPL-MCNC: 86 MG/DL (ref 70–99)
HCO3 SERPL-SCNC: 22 MMOL/L (ref 22–29)
HCT VFR BLD AUTO: 26.9 % (ref 40–53)
HGB BLD-MCNC: 8.9 G/DL (ref 13.3–17.7)
INR PPP: 1.85 (ref 0.85–1.15)
MCH RBC QN AUTO: 30.2 PG (ref 26.5–33)
MCHC RBC AUTO-ENTMCNC: 33.1 G/DL (ref 31.5–36.5)
MCV RBC AUTO: 91 FL (ref 78–100)
PLATELET # BLD AUTO: 175 10E3/UL (ref 150–450)
POTASSIUM SERPL-SCNC: 4.2 MMOL/L (ref 3.4–5.3)
PROTHROMBIN TIME: 21.6 SECONDS (ref 11.8–14.8)
RBC # BLD AUTO: 2.95 10E6/UL (ref 4.4–5.9)
SODIUM SERPL-SCNC: 131 MMOL/L (ref 135–145)
WBC # BLD AUTO: 3.4 10E3/UL (ref 4–11)

## 2025-07-16 PROCEDURE — 120N000003 HC R&B IMCU UMMC

## 2025-07-16 PROCEDURE — 36415 COLL VENOUS BLD VENIPUNCTURE: CPT | Performed by: NURSE PRACTITIONER

## 2025-07-16 PROCEDURE — 36569 INSJ PICC 5 YR+ W/O IMAGING: CPT

## 2025-07-16 PROCEDURE — 250N000011 HC RX IP 250 OP 636: Performed by: INTERNAL MEDICINE

## 2025-07-16 PROCEDURE — 999N000065 XR CHEST PORT 1 VIEW

## 2025-07-16 PROCEDURE — 99233 SBSQ HOSP IP/OBS HIGH 50: CPT | Mod: 25 | Performed by: PHYSICIAN ASSISTANT

## 2025-07-16 PROCEDURE — 250N000013 HC RX MED GY IP 250 OP 250 PS 637: Performed by: INTERNAL MEDICINE

## 2025-07-16 PROCEDURE — 250N000009 HC RX 250: Performed by: PHYSICIAN ASSISTANT

## 2025-07-16 PROCEDURE — 80048 BASIC METABOLIC PNL TOTAL CA: CPT | Performed by: NURSE PRACTITIONER

## 2025-07-16 PROCEDURE — 272N000451 HC KIT SHRLOCK 5FR POWER PICC DOUBLE LUMEN

## 2025-07-16 PROCEDURE — 99233 SBSQ HOSP IP/OBS HIGH 50: CPT | Performed by: INTERNAL MEDICINE

## 2025-07-16 PROCEDURE — 85610 PROTHROMBIN TIME: CPT | Performed by: NURSE PRACTITIONER

## 2025-07-16 PROCEDURE — 250N000011 HC RX IP 250 OP 636: Performed by: PHYSICIAN ASSISTANT

## 2025-07-16 PROCEDURE — 71045 X-RAY EXAM CHEST 1 VIEW: CPT | Mod: 26 | Performed by: RADIOLOGY

## 2025-07-16 PROCEDURE — G0545 PR INHRENT VISIT TO INPT/OBS W CNFRM/SUSPCT INFCT DIS BY INFCT DIS SPCIALST: HCPCS | Performed by: INTERNAL MEDICINE

## 2025-07-16 PROCEDURE — 93750 INTERROGATION VAD IN PERSON: CPT | Performed by: PHYSICIAN ASSISTANT

## 2025-07-16 PROCEDURE — 250N000013 HC RX MED GY IP 250 OP 250 PS 637: Performed by: NURSE PRACTITIONER

## 2025-07-16 PROCEDURE — 85027 COMPLETE CBC AUTOMATED: CPT | Performed by: NURSE PRACTITIONER

## 2025-07-16 RX ORDER — LIDOCAINE 40 MG/G
CREAM TOPICAL
Status: DISCONTINUED | OUTPATIENT
Start: 2025-07-16 | End: 2025-07-18 | Stop reason: HOSPADM

## 2025-07-16 RX ORDER — HEPARIN SODIUM,PORCINE 10 UNIT/ML
5-15 VIAL (ML) INTRAVENOUS
Status: DISCONTINUED | OUTPATIENT
Start: 2025-07-16 | End: 2025-07-18 | Stop reason: HOSPADM

## 2025-07-16 RX ORDER — HEPARIN SODIUM,PORCINE 10 UNIT/ML
5-15 VIAL (ML) INTRAVENOUS EVERY 24 HOURS
Status: DISCONTINUED | OUTPATIENT
Start: 2025-07-16 | End: 2025-07-18 | Stop reason: HOSPADM

## 2025-07-16 RX ORDER — WARFARIN SODIUM 2.5 MG/1
2.5 TABLET ORAL
Status: COMPLETED | OUTPATIENT
Start: 2025-07-16 | End: 2025-07-16

## 2025-07-16 RX ADMIN — DIGOXIN 62.5 MCG: 0.06 TABLET ORAL at 08:44

## 2025-07-16 RX ADMIN — PSYLLIUM HUSK 1 PACKET: 3.4 POWDER ORAL at 08:43

## 2025-07-16 RX ADMIN — ESCITALOPRAM OXALATE 10 MG: 10 TABLET ORAL at 08:44

## 2025-07-16 RX ADMIN — Medication 5 ML: at 18:23

## 2025-07-16 RX ADMIN — LIDOCAINE HYDROCHLORIDE ANHYDROUS 3 ML: 10 INJECTION, SOLUTION INFILTRATION at 16:28

## 2025-07-16 RX ADMIN — AMLODIPINE BESYLATE 5 MG: 5 TABLET ORAL at 19:53

## 2025-07-16 RX ADMIN — Medication 1250 MG: at 11:17

## 2025-07-16 RX ADMIN — AMIODARONE HYDROCHLORIDE 200 MG: 200 TABLET ORAL at 08:44

## 2025-07-16 RX ADMIN — GABAPENTIN 100 MG: 100 CAPSULE ORAL at 21:53

## 2025-07-16 RX ADMIN — MAGNESIUM OXIDE TAB 400 MG (241.3 MG ELEMENTAL MG) 400 MG: 400 (241.3 MG) TAB at 19:53

## 2025-07-16 RX ADMIN — WARFARIN SODIUM 2.5 MG: 2.5 TABLET ORAL at 18:23

## 2025-07-16 RX ADMIN — Medication 10 MG: at 19:53

## 2025-07-16 RX ADMIN — ATORVASTATIN CALCIUM 80 MG: 80 TABLET, FILM COATED ORAL at 19:53

## 2025-07-16 RX ADMIN — Medication 1 TABLET: at 08:44

## 2025-07-16 ASSESSMENT — ACTIVITIES OF DAILY LIVING (ADL)
ADLS_ACUITY_SCORE: 42
ADLS_ACUITY_SCORE: 42
ADLS_ACUITY_SCORE: 41
ADLS_ACUITY_SCORE: 42
ADLS_ACUITY_SCORE: 41
ADLS_ACUITY_SCORE: 42
ADLS_ACUITY_SCORE: 41
ADLS_ACUITY_SCORE: 42
ADLS_ACUITY_SCORE: 41
ADLS_ACUITY_SCORE: 42
ADLS_ACUITY_SCORE: 41
ADLS_ACUITY_SCORE: 42
ADLS_ACUITY_SCORE: 41
ADLS_ACUITY_SCORE: 42
ADLS_ACUITY_SCORE: 42
ADLS_ACUITY_SCORE: 41
ADLS_ACUITY_SCORE: 42
ADLS_ACUITY_SCORE: 42
ADLS_ACUITY_SCORE: 41

## 2025-07-16 NOTE — PROGRESS NOTES
"Care Management Follow Up    Length of Stay (days): 2    Expected Discharge Date: 07/17/2025     Concerns to be Addressed: denies needs/concerns at this time     Patient plan of care discussed at interdisciplinary rounds: Yes    Anticipated Discharge Disposition:  home vs TCS      Anticipated Discharge Services:  infusion  Anticipated Discharge DME:  infusion supplies    Patient/family educated on Medicare website which has current facility and service quality ratings:  yes  Education Provided on the Discharge Plan:  yes  Patient/Family in Agreement with the Plan:  TBD    Referrals Placed by CM/SW: Home Infusion  Private pay costs discussed: Not applicable    Discussed  Partnership in Safe Discharge Planning  document with patient/family: Yes: wife Melissa     Handoff Completed: No, handoff not indicated or clinically appropriate    Additional Information:    Per updates, patient  will discharge with IV antibiotic.     Home infusion referral sent.    First option  Per I liaison \"[place] does not have iv abx coverage in the home with their Medicare and [employer] Supplement plan. Drug would be billed to the part D and supplies will be self-pay. Based on [id number] q24h, total cost is $147.11 per day for drug and supplies.     For nursing, patient should have coverage if homebound, however [facility] is not contracted with Medicare and an outside nursing agency would be utilized instead. If patient is not homebound, there is no coverage and [facility] can see patient if patient agrees to self-pay for $90 per visit.\"      Second Option is outpatient [place] infusion clinic (and can they provide the infusion on weekends?)  Called [place] [facility] at phone: [phone], no answer and left a detail message with call back number.    Third option will be TCU      Writer discussed the 3 discharge options with patient's wife Melissa. She noted down the out of pocket home infusion cost and waiting on updates on all options to " make their final choice.     Next Steps:     Follow up with outpatient [place] clinic if they can do daily infusion including weekend< And if they don't TCU will be the last option.   Update patient and wife on all 3 options.        Elizabeth Cooper RN, PHN, BSN   [facility] Nurse Care Coordinator  Covering for Unit Ed/6C  Phone [phone]

## 2025-07-16 NOTE — PROGRESS NOTES
"  [facility] PROGRESS NOTE     Patient:  Duane C Johnson   YOB: 1950, MRN: [id number]  Date of Visit: 07/16/2025  Date of Admission: 7/14/2025          ASSESSMENT AND PLAN     Duane C Johnson is a 75-year-old male with a destination LVAD presents with his first episode of a driveline infection. We recommend initiating vancomycin therapy, to continue until his follow-up Infectious Diseases (ID) visit at the LVAD clinic, which is expected to be in approximately two weeks. At that time, the team will reassess his condition and determine the next steps in management.    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    History of VT/VF arrest 2023 2/2 STEMI  AFib s/p DCCV 9/2024 and again 9/30/2024    CAD w/h/o anterior STEMI s/p pLAD stent in 2023  Inguinal hernia    RECOMMENDATIONS:  Continue IV vancomycin until Aug 4, 2025. AUC target 400-600.  LVAD clinic visit on Aug 4, 2025.    Prolonged Parenteral/Oral Antibiotic Recommendations and ID Follow up  This template provides final ID recommendations as of this date.     Infectious Diseases Indication: Driveline infection     Antibiotic Information  Name of Antibiotic Dose of Antibiotic1 Anticipated duration Effective start date2 End date   Vancomycin 1250mg daily (target -600)  Pharm to adjust 3 weeks Jul 15, 2025 Aug 4, 2025 tentative                 1.Dose of antibiotic will need to be renally adjusted if creatinine clearance changes  2.Effective start date is the date of adequate therapy with appropriate spectrum    Method of antibiotic delivery:PICC line.Is the line being used for another indication besides antimicrobials? No At the end of therapy should the line used for antimicrobials be removed or de-accessed? No. Selecting \"yes\" will function as written order to remove PICC line or de-access the indwelling line at the " end of therapy.    Weekly labs required: Creatinine, AST/ALT, CBC with diff, and Vancomycin level. Dr. Sarah will follow labs at discharge until ID follow up. Fax labs to ID clinic.    [facility] ID Clinic Information:  Phone: [phone]  Fax: [phone] (Attention ID clinic nurses)    ID will sign off. Please check [facility] for staff covering the service.     Roxi Sarah MD  Infectious Diseases  Vocera courtney    50 MINUTES SPENT BY ME on the date of service doing chart review, history, exam, documentation & further activities per the note.     complex antibiotic therapy.          SUBJECTIVE      Interval History and Events:  No rash, diarrhea. Abdominal pain better.     Antimicrobial Treatment:  Vanco 7/15-         OBJECTIVE       Physical Examination:    Temp:  [98  F (36.7  C)-98.4  F (36.9  C)] 98.1  F (36.7  C)  Pulse:  [60-76] 60  Resp:  [16-18] 18  SpO2:  [92 %-99 %] 92 %    I/O last 3 completed shifts:  In: 120 [P.O.:120]  Out: 2900 [Urine:2900]    Vitals:    07/14/25 1048 07/15/25 0244 07/16/25 0500   Weight: 77.1 kg (170 lb) 77 kg (169 lb 11.2 oz) 74.6 kg (164 lb 8 oz)     Constitutional: Awake, alert, interactive.  Respiratory: No increased work of breathing  Cardiovascular: irregular rhythm     I reviewed the following laboratory data:    Microbiology:  7/14 Wcx - Corynebacterium striatum  7/2 Wcx - Corynebacterium striatum

## 2025-07-16 NOTE — PROGRESS NOTES
The patient's HeartMate LVAD was interrogated 2025  Heartmate 3 LEFT VS  Flow (Lpm): 3.1 Lpm  Pulse Index (PI): 6.2 PI  Speed (rpm): 5000 rpm  Power (jackson): 3.1 jackson  Current Hct settin  Fluid status: euvolemic   Alarms were reviewed, and notable for frequent pi events, no alarms.   The driveline exit site was inspected, c/d/i.   All external components were inspected and showed no evidence of damage or malfunction, none replaced.   No changes to VAD settings made

## 2025-07-16 NOTE — CARE PLAN
75 year old male with a past medical history including CAD with history of anterior STEMI s/p PCI to the pLAD on 10/26/23 with a VT/VF arrest the next day (10/27), multiple subsequent admissions for HF exacerbation, with HFrEF 2/2 ICM s/p HM3 LVAD c/b frequent low atilio alarms s/p low-flow controller. He was recently started on oral antibiotics for driveline infection and is now presenting with increased weakness, dizziness, episodes of emesis, and ongoing driveline drainage     Hours of Care: 5138-3211    Neuro: A&Ox4. Calls appropriately. Denies headache and dizziness.   Cardiac: Afebrile, VSS. HM 3 LVAD numbers WNL. SR, HRs in the 60s-70s, with intermittent atrial pacing.  Respiratory: Room air; dyspnea on exertion  GI/: Voiding spontaneously via urinal. LBM 7/15/25.   Diet/appetite: Mechanical/dental soft diet. Denies nausea.  Activity: Independent/SBA  Pain: Denies   Skin: No new deficits noted. Driveline site; dressing changed this afternoon.  Lines: R PIV SL; right single lumen PICC placed today.    Plan: Discharge to home x outpatient infusion clinic x TCU (see care doordinator's note). Contact CARDS 2 with questions or concerns.

## 2025-07-16 NOTE — PROCEDURES
[facility]    Double Lumen PICC Placement    Date/Time: 7/16/2025 4:52 PM    Performed by: Florecita Bpatiste RN  Authorized by: Torrie Forrest PA-C  Indications: vascular access      UNIVERSAL PROTOCOL   Site Marked: Yes  Prior Images Obtained and Reviewed:  Yes  Required items: Required blood products, implants, devices and special equipment available    Patient identity confirmed:  Verbally with patient, arm band and hospital-assigned identification number  NA - No sedation, light sedation, or local anesthesia (lidocaine was given local anesthesia)  Confirmation Checklist:  Patient's identity using two indicators, relevant allergies, procedure was appropriate and matched the consent or emergent situation and correct equipment/implants were available  Time out: Immediately prior to the procedure a time out was called    Universal Protocol: the Joint Commission Universal Protocol was followed    Preparation: Patient was prepped and draped in usual sterile fashion       ANESTHESIA    Anesthesia:  Local infiltration  Local Anesthetic:  Lidocaine 1% without epinephrine  Anesthetic Total (mL):  3      SEDATION    Patient Sedated: No        Preparation: skin prepped with 2% chlorhexidine and skin prepped with ChloraPrep  Skin prep agent: skin prep agent completely dried prior to procedure  Sterile barriers: maximum sterile barriers were used: cap, mask, sterile gown, sterile gloves, and large sterile sheet  Hand hygiene: hand hygiene performed prior to central venous catheter insertion  Type of line used: PICC  Catheter type: double lumen  Lumen type: non-valved and power PICC  Lumen Identification: Purple and Red  Catheter size: 5 Fr  Brand: Bard  Lot number: [id number]  Placement method: venipuncture, MST, ultrasound and tip navigation system  Number of attempts: 1  Difficulty threading catheter: yes (went IJ, resistance noted maybe from the ICD wires)  Successful  placement: yes  Orientation: right  Catheter to Vein (%): 27  Location: basilic vein (0.66 cm vein diameter)  Tip Location: SVC  Site rationale: ICD left chest wall  Arm circumference: adults 10 cm  Extremity circumference: 26  Visible catheter length: 1  Total catheter length: 39  Dressing and securement: adhesive securement device, blood cleaned with CHG, alcohol impregnated caps, chlorhexidine disc applied, dressing applied, gloves changed prior to final dressing, site cleansed, statlock, sterile dressing applied and transparent dressing  Post procedure assessment: blood return through all ports, free fluid flow and placement verified by x-ray  PROCEDURE Describe Procedure: PICC okay to use once verified in central location by Radiologist. Chest xray was released.   Disposal: sharps and needle count correct at the end of procedure, needles and guidewire disposed in sharps container  Patient Tolerance:  Patient tolerated the procedure well with no immediate complications

## 2025-07-16 NOTE — CONSULTS
"PICC placed in right upper extremity for antibiotic therapy. Patient tolerated well and denies pain. PICC okay to use once verified in central location by Radiologist. Chest xray was released. To contact the Vascular Access team enter a \"consult for inpatient vascular access\" [id number] order in EPIC.    "

## 2025-07-16 NOTE — PROGRESS NOTES
Care Management Follow Up    Length of Stay (days): 2    Expected Discharge Date: 07/17/2025     Concerns to be Addressed: denies needs/concerns at this time     Patient plan of care discussed at interdisciplinary rounds: Yes    Anticipated Discharge Disposition:                Anticipated Discharge Services:    Anticipated Discharge DME:      Patient/family educated on Medicare website which has current facility and service quality ratings:    Education Provided on the Discharge Plan:    Patient/Family in Agreement with the Plan:      Referrals Placed by CM/SW:    Private pay costs discussed: TBD HI costs: IV vancomycin needed through 8/4 per ID provider, has ID appt set up for 8/4    Discussed  Partnership in Safe Discharge Planning  document with patient/family: Yes     Handoff Completed: No, handoff not indicated or clinically appropriate    Additional Information:  RNCC updated in rounds pt to need IV vancomycin to continue after discharge, likely ready by 7/17. RNCC updated covering RNCC that patient would need assistance in coordinating, likely to use [facility] pending cost check vs. Outpatient infusion set up.    Next Steps: coodinate IV vancomycin, ID clinic follow up 8/4, med needed through 8/4    SANDIE DelgadoN, BA, RN, CMSRN  6C (Beds [address]19 and 1320) Nurse Care Coordinator   [facility] Acute Care Management  Phone: [phone]  Available on Vocera: Jaylon Aguila

## 2025-07-16 NOTE — PROGRESS NOTES
[facility]   Cardiology II Service / Advanced Heart Failure  Daily Progress Note      Patient: Duane C Johnson  MRN: [id number]  Admission Date: 7/14/2025  Hospital Day # 2    Assessment and Plan: Duane C Johnson is a 75 year old male with a past medical history including CAD with history of anterior STEMI s/p PCI to the pLAD on 10/26/23 with a VT/VF arrest the next day (10/27), multiple subsequent admissions for HF exacerbation, with HFrEF 2/2 ICM s/p HM3 LVAD c/b frequent low atilio alarms s/p low-flow controller. He was recently started on oral antibiotics for driveline infection and is now presenting with increased weakness, dizziness, episodes of emesis, and ongoing driveline drainage.     Today's Plan:  - Continue IV vanc, pharmacy to dose  - Increase Gatorade intake to closer to 60 ounces daily, appreciate dietician assistance  - Discussing course length with ID  - Will discuss IV antibiotic coverage with case management    # LVAD driveline infection  Seen in clinic 7/2/25 with reports of ongoing driveline drainage x 1 month. Driveline cultures + corynebacterium striatum. Started on doxy and then coverage narrowed to Bactrim DS BID with plan for 2 week course through ~7/21.  CT abdomen/pelvis with contrast with some fat stranding but no fluid collection  - ID consult   - Will discuss IV antibiotic coverage with case management  - CVTS consult for lack of driveline seal, increased infection risk, eval for utility of driveline suture- they don't recommend intervention at this time    - Repeat driveline cx pending, currently NGTD  - Stopped PTA bactrim d/t resistant bacteria on cultures  - Now on IV vancomycin, dose by pharmacy, discussing course length with ID  - Continue daily LVAD dressing changes      # Chronic systolic heart failure secondary to ICM s/p HM3 LVAD as DT on 9/18/24.    Stage C. NYHA Class III.     Fluid status euvolemic- doing well with aggressive fluid and salt intake, not on a  loop diuretic. No fluid restriction. His goal is to drink about 64 ounces of gatorade daily and 128 ounces of fluid total  ACEi/ARB: none given frequent hypotension  Afterload- continue PTA amlodipine 5 mg daily  BB: none given frequent hypotension   Aldosterone antagonist: avoiding d/t frequent dehydration  SGLT2i: avoiding d/t frequent dehydration  SCD prophylaxis ICD  NSAID use: contraindicated  BP: MAP goal 65-85  LDH trends: 194, stable  Anticoagulation: warfarin, INR goal 2.0-2.5 given periodic rectal bleeding with radiation proctitis. Today's INR 1.85, dosing per pharmacy. Defer bridge.   Antiplatelet: N/A  Other: Continue mag ox 400 mg daily     # Low flow alarms, resolved  # Labile MAPs   # Suspected orthostatic hypotension, resolved  Low flows seems releated to hypertension and some hypovolemia as well. Have improved with re-timing hydralazine and after increasing oral fluids. CTA was done on  10/17/24 with no outflow graft ostruction. The outflow graft does make an acute angle as it approaches the aorta. Dr. Smith has reviewed inflow and outflow positioning, nothing to do . Speed was dropped to 5000 in oct 2024 which helped as well as stopping losartan and sglt2i as above.   - Speed set at 5000- decreased speed in Oct 2024 helped low-flow alarms  - Off all medications with a diuretic effect  - Other BP med management as above  - Previously stopped fludrocortisone 0.1 mg daily   - Has a LOW FLOW controller, will not alarm until flow is </= 2  - Continue to push fluids, including gatorade, salty snacks, etc      # History of VT/VF arrest 2023 2/2 STEMI  # AFib s/p DCCV 9/2024 and again 9/30/2024   Has had multiple attempted cardioversions. Eventually planned for just rate control, so no further cardioversions were planned, but now back in sinus.   - ICD 6/27/25 without any atrial fibrillation since the last icd check, continue checks per protocol   - Continue amiodarone 200 mg daily  - Continue digoxin  62.5 mcg daily   - AC as above     # CAD w/h/o anterior STEMI s/p pLAD stent in 2023  - On coumadin, no asa given on coumadin  - BB deferred as above  - Continue atorvastatin 80 mg daily     # Dental concerns. Has no teeth. Needs dentures or alternative planning but has been told that he cannot have that until his abnormal bone growith along lower jaw is addressed. He has seen local dentists and oral surgeons but has been told his complexity is above their clinics ability  - Referred to [facility] at [facility] at discharge      # Rectal bleeding  # Acute blood loss anemia  Has had episodes of rectal bleeding, none within the last 2 weeks. He has seen GI and colorectal surgery twice. Has been told this is due to his radiation proctitis. Hgb has dropped in the last 2 months, presently stable ~ 9. No further episodes.  - Daily CBC for now, consider spacing out  - Recent iron studies iron 30, iron sat 13. He would benefit from IV iron, however will defer until driveline infection improving and near completion of treatment     # Cognitive changes  - Needs formal driving assessment with DMV or OT prior to being cleared to drive, wife aware and also discussed with patient at time of discharge- we discussed this again today. Would defer for at least another 3 months with ongoing medical issues  - Repeat neuropsych testing in 1 year post discharge from index admission  - Referred to behavioral neurology in place     # Inguinal hernia, can self-reduce. No pain, but irritating and uncomfortable. Recently referred to general surgery, was scheduled to see in clinic on 7/14, but will need to reschedule due to hospital admission.      FEN: regular  PROPHY:  warfarin   LINES:  PIV  DISPO:  2-5 days   CODE STATUS:  Full Code    Torrie Forrest PA-C  Advanced Heart Failure/Cardiology II Service  Vocera preferred, or pager [phone]      Patient discussed with Dr. Laurent.        55 minutes spent on the date of the encounter doing  "chart review, history and exam, documentation and further activities per the note    ================================================================    Subjective/24-Hr Events:   Last 24 hr care team notes reviewed. He is feeling much better. No lightheadedness. No low flow alarms. No more nausea. No vomitting or diarrhea. He is tolerating PO intake well. No pain around the driveline. No bleeding symptoms. No lightheadeness or dizziness. He \"walked all over yesterday\" and felt great. No swelling.     ROS:  4 point ROS including respiratory, CV, GI and  (other than that noted in the HPI) is negative.     Medications: Reviewed in EPIC.     Physical Exam:   BP 93/81 (BP Location: Left arm)   Pulse 60   Temp 98.1  F (36.7  C) (Oral)   Resp 16   Ht 1.702 m (5' 7\")   Wt 74.6 kg (164 lb 8 oz)   SpO2 99%   BMI 25.76 kg/m      GENERAL: Appears comfortable, in no distress .  HEENT: Eye symmetrical, no discharge or icterus bilaterally. ple, JVD <6.   CV: RRR, +S1S2, no murmur, rub, or gallop.   RESPIRATORY: Respirations regular, even, and unlabored. Lungs CTA throughout.    GI: Soft and non distended   EXTREMITIES: No peripheral edema. All extremities are warm ane well perfused  NEUROLOGIC: Alert and interacting appropriatlye  SKIN: No jaundice. No rashes or lesions.  Drivelien  dressing c/d/i    Labs:  CMP  Recent Labs   Lab 07/16/25  0619 07/15/25  0554 07/14/25  1131   * 127* 128*   POTASSIUM 4.2 4.3 4.7   CHLORIDE 101 95* 95*   CO2 22 21* 21*   ANIONGAP 8 11 12   GLC 86 96 108*   BUN 12.8 15.4 15.4   CR 1.11 1.21* 1.13   GFRESTIMATED 69 62 68   PRANAV 8.5* 8.5* 9.0   PROTTOTAL  --   --  7.1   ALBUMIN  --   --  4.0   BILITOTAL  --   --  0.3   ALKPHOS  --   --  109   AST  --   --  79*   ALT  --   --  64       CBC  Recent Labs   Lab 07/16/25  0619 07/15/25  0554 07/14/25  1131   WBC 3.4* 4.1 4.2   RBC 2.95* 2.88* 3.02*   HGB 8.9* 8.7* 9.0*   HCT 26.9* 26.2* 27.3*   MCV 91 91 90   MCH 30.2 30.2 29.8   MCHC 33.1 " 33.2 33.0   RDW 15.6* 15.3* 15.5*    175 213       INR  Recent Labs   Lab 07/16/25  0619 07/15/25  0554 07/14/25  1131 07/11/25  1128   INR 1.85* 1.74* 1.52* 1.75*

## 2025-07-16 NOTE — PLAN OF CARE
Shift: [id number]      Neuro: A&Ox4. Calls appropriately. Denies headache and dizziness.   Cardiac: Afebrile, VSS. HM 3 LVAD numbers WNL. SR, HR's in the 60's-70's, with intermittent atrial pacing overnight.    Respiratory: RA, DIOP.   GI/: Voiding spontaneously via urinal. No BM this shift. LBM 7/15/25.   Diet/appetite: Tolerating mechanical/dental soft diet. Denies nausea.  Activity: Independent/SBA  Pain: Denies   Skin: No new deficits noted.  Lines: R PIV SL      Plan: Followed ongoing plan of care, notify CARDS 2 of any changes.

## 2025-07-17 ENCOUNTER — APPOINTMENT (OUTPATIENT)
Dept: OCCUPATIONAL THERAPY | Facility: CLINIC | Age: 75
DRG: 315 | End: 2025-07-17
Payer: MEDICARE

## 2025-07-17 VITALS
WEIGHT: 164.2 LBS | HEART RATE: 72 BPM | SYSTOLIC BLOOD PRESSURE: 93 MMHG | BODY MASS INDEX: 25.77 KG/M2 | TEMPERATURE: 97.7 F | OXYGEN SATURATION: 99 % | HEIGHT: 67 IN | RESPIRATION RATE: 16 BRPM | DIASTOLIC BLOOD PRESSURE: 81 MMHG

## 2025-07-17 PROBLEM — B94.8 SEQUELA OF CORYNEBACTERIUM INFECTION: Status: ACTIVE | Noted: 2025-07-17

## 2025-07-17 LAB
ANION GAP SERPL CALCULATED.3IONS-SCNC: 8 MMOL/L (ref 7–15)
BACTERIA WND CULT: ABNORMAL
BACTERIA WND CULT: NORMAL
BUN SERPL-MCNC: 13.6 MG/DL (ref 8–23)
CALCIUM SERPL-MCNC: 8.5 MG/DL (ref 8.8–10.4)
CHLORIDE SERPL-SCNC: 97 MMOL/L (ref 98–107)
CREAT SERPL-MCNC: 0.95 MG/DL (ref 0.67–1.17)
EGFRCR SERPLBLD CKD-EPI 2021: 83 ML/MIN/1.73M2
ERYTHROCYTE [DISTWIDTH] IN BLOOD BY AUTOMATED COUNT: 15.2 % (ref 10–15)
GLUCOSE SERPL-MCNC: 85 MG/DL (ref 70–99)
GRAM STAIN RESULT: ABNORMAL
GRAM STAIN RESULT: ABNORMAL
HCO3 SERPL-SCNC: 24 MMOL/L (ref 22–29)
HCT VFR BLD AUTO: 25.5 % (ref 40–53)
HGB BLD-MCNC: 8.3 G/DL (ref 13.3–17.7)
INR PPP: 2.17 (ref 0.85–1.15)
MCH RBC QN AUTO: 29.5 PG (ref 26.5–33)
MCHC RBC AUTO-ENTMCNC: 32.5 G/DL (ref 31.5–36.5)
MCV RBC AUTO: 91 FL (ref 78–100)
PLATELET # BLD AUTO: 187 10E3/UL (ref 150–450)
POTASSIUM SERPL-SCNC: 4.2 MMOL/L (ref 3.4–5.3)
PROTHROMBIN TIME: 24.3 SECONDS (ref 11.8–14.8)
RBC # BLD AUTO: 2.81 10E6/UL (ref 4.4–5.9)
SODIUM SERPL-SCNC: 129 MMOL/L (ref 135–145)
VANCOMYCIN SERPL-MCNC: 10.6 UG/ML (ref ?–25)
WBC # BLD AUTO: 3.8 10E3/UL (ref 4–11)

## 2025-07-17 PROCEDURE — 120N000003 HC R&B IMCU UMMC

## 2025-07-17 PROCEDURE — 99233 SBSQ HOSP IP/OBS HIGH 50: CPT | Mod: 25 | Performed by: PHYSICIAN ASSISTANT

## 2025-07-17 PROCEDURE — 80202 ASSAY OF VANCOMYCIN: CPT | Performed by: INTERNAL MEDICINE

## 2025-07-17 PROCEDURE — 80048 BASIC METABOLIC PNL TOTAL CA: CPT | Performed by: NURSE PRACTITIONER

## 2025-07-17 PROCEDURE — 250N000011 HC RX IP 250 OP 636: Performed by: PHYSICIAN ASSISTANT

## 2025-07-17 PROCEDURE — 250N000011 HC RX IP 250 OP 636: Performed by: INTERNAL MEDICINE

## 2025-07-17 PROCEDURE — 250N000013 HC RX MED GY IP 250 OP 250 PS 637

## 2025-07-17 PROCEDURE — 85014 HEMATOCRIT: CPT | Performed by: NURSE PRACTITIONER

## 2025-07-17 PROCEDURE — 97535 SELF CARE MNGMENT TRAINING: CPT | Mod: GO

## 2025-07-17 PROCEDURE — 250N000013 HC RX MED GY IP 250 OP 250 PS 637: Performed by: INTERNAL MEDICINE

## 2025-07-17 PROCEDURE — 250N000013 HC RX MED GY IP 250 OP 250 PS 637: Performed by: NURSE PRACTITIONER

## 2025-07-17 PROCEDURE — 93750 INTERROGATION VAD IN PERSON: CPT | Performed by: PHYSICIAN ASSISTANT

## 2025-07-17 PROCEDURE — 85610 PROTHROMBIN TIME: CPT | Performed by: NURSE PRACTITIONER

## 2025-07-17 PROCEDURE — 99207 PR NO CHARGE LOS: CPT | Performed by: SURGERY

## 2025-07-17 PROCEDURE — 97530 THERAPEUTIC ACTIVITIES: CPT | Mod: GO

## 2025-07-17 RX ORDER — ALBUTEROL SULFATE 0.83 MG/ML
2.5 SOLUTION RESPIRATORY (INHALATION)
Status: CANCELLED | OUTPATIENT
Start: 2025-07-18

## 2025-07-17 RX ORDER — ALBUTEROL SULFATE 90 UG/1
1-2 INHALANT RESPIRATORY (INHALATION)
Status: CANCELLED
Start: 2025-07-18

## 2025-07-17 RX ORDER — SUCRALFATE ORAL 1 G/10ML
2 SUSPENSION ORAL DAILY
Status: DISCONTINUED | OUTPATIENT
Start: 2025-07-17 | End: 2025-07-18 | Stop reason: HOSPADM

## 2025-07-17 RX ORDER — MEPERIDINE HYDROCHLORIDE 25 MG/ML
25 INJECTION INTRAMUSCULAR; INTRAVENOUS; SUBCUTANEOUS
Status: CANCELLED | OUTPATIENT
Start: 2025-07-18

## 2025-07-17 RX ORDER — DIPHENHYDRAMINE HYDROCHLORIDE 50 MG/ML
25 INJECTION, SOLUTION INTRAMUSCULAR; INTRAVENOUS
Status: CANCELLED
Start: 2025-07-18

## 2025-07-17 RX ORDER — HEPARIN SODIUM,PORCINE 10 UNIT/ML
5-20 VIAL (ML) INTRAVENOUS DAILY PRN
Status: CANCELLED | OUTPATIENT
Start: 2025-07-18

## 2025-07-17 RX ORDER — EPINEPHRINE 1 MG/ML
0.3 INJECTION, SOLUTION, CONCENTRATE INTRAVENOUS EVERY 5 MIN PRN
Status: CANCELLED | OUTPATIENT
Start: 2025-07-18

## 2025-07-17 RX ORDER — DIPHENHYDRAMINE HYDROCHLORIDE 50 MG/ML
50 INJECTION, SOLUTION INTRAMUSCULAR; INTRAVENOUS
Status: CANCELLED
Start: 2025-07-18

## 2025-07-17 RX ORDER — METHYLPREDNISOLONE SODIUM SUCCINATE 40 MG/ML
40 INJECTION INTRAMUSCULAR; INTRAVENOUS
Status: CANCELLED
Start: 2025-07-18

## 2025-07-17 RX ORDER — HEPARIN SODIUM (PORCINE) LOCK FLUSH IV SOLN 100 UNIT/ML 100 UNIT/ML
5 SOLUTION INTRAVENOUS
Status: CANCELLED | OUTPATIENT
Start: 2025-07-18

## 2025-07-17 RX ADMIN — Medication 10 MG: at 20:31

## 2025-07-17 RX ADMIN — MAGNESIUM OXIDE TAB 400 MG (241.3 MG ELEMENTAL MG) 400 MG: 400 (241.3 MG) TAB at 20:31

## 2025-07-17 RX ADMIN — Medication 5 ML: at 18:23

## 2025-07-17 RX ADMIN — ATORVASTATIN CALCIUM 80 MG: 80 TABLET, FILM COATED ORAL at 20:31

## 2025-07-17 RX ADMIN — AMIODARONE HYDROCHLORIDE 200 MG: 200 TABLET ORAL at 09:07

## 2025-07-17 RX ADMIN — GABAPENTIN 100 MG: 100 CAPSULE ORAL at 21:10

## 2025-07-17 RX ADMIN — Medication 1 TABLET: at 09:07

## 2025-07-17 RX ADMIN — DIGOXIN 62.5 MCG: 0.06 TABLET ORAL at 09:07

## 2025-07-17 RX ADMIN — ESCITALOPRAM OXALATE 10 MG: 10 TABLET ORAL at 09:07

## 2025-07-17 RX ADMIN — SUCRALFATE 2 G: 1 SUSPENSION ORAL at 23:01

## 2025-07-17 RX ADMIN — AMLODIPINE BESYLATE 5 MG: 5 TABLET ORAL at 20:31

## 2025-07-17 RX ADMIN — WARFARIN SODIUM 1.25 MG: 2.5 TABLET ORAL at 18:17

## 2025-07-17 RX ADMIN — Medication 1500 MG: at 09:18

## 2025-07-17 RX ADMIN — PSYLLIUM HUSK 1 PACKET: 3.4 POWDER ORAL at 09:07

## 2025-07-17 ASSESSMENT — ACTIVITIES OF DAILY LIVING (ADL)
ADLS_ACUITY_SCORE: 43
ADLS_ACUITY_SCORE: 42
ADLS_ACUITY_SCORE: 43
ADLS_ACUITY_SCORE: 42
ADLS_ACUITY_SCORE: 43
ADLS_ACUITY_SCORE: 42
ADLS_ACUITY_SCORE: 43
ADLS_ACUITY_SCORE: 42
ADLS_ACUITY_SCORE: 42
ADLS_ACUITY_SCORE: 43
ADLS_ACUITY_SCORE: 42
ADLS_ACUITY_SCORE: 43
ADLS_ACUITY_SCORE: 43
ADLS_ACUITY_SCORE: 42
ADLS_ACUITY_SCORE: 42
ADLS_ACUITY_SCORE: 43
ADLS_ACUITY_SCORE: 42
ADLS_ACUITY_SCORE: 42

## 2025-07-17 NOTE — PROGRESS NOTES
"0700-1930    STATUS: 75 year old male with a past medical history including CAD with history of anterior STEMI s/p PCI to the pLAD on 10/26/23 with a VT/VF arrest the next day (10/27), multiple subsequent admissions for HF exacerbation, with HFrEF 2/2 ICM s/p HM3 LVAD c/b frequent low atilio alarms s/p low-flow controller. He was recently started on oral antibiotics for driveline infection and is now presenting with increased weakness, dizziness, episodes of emesis, and ongoing driveline drainage.     NEURO: A&O x4; calls appropriately; able to make needs known   RESPIRATORY: Room air; sats >94%   CARDIAC: 100% a-paced; HM3 LVAD numbers WNL; no alarms  GI/: Voiding adequately; no BM this shift; plan for carafate enema when available    ENDOCRINE: Not on BG checks;    SKIN: LVAD driveline; to be changed this evening   PAIN: Denies    TESTS/PROCEDURES: N/A  LAB: Not on electrolyte replacement protocols; recheck in AM   MOBILITY: Up ad mackenzie   DIET: Mechanical/Dental soft diet;    LDAs: PIV x1 SL; PICC x2 lumens for IV vancomycin      PLAN: Potential discharge tomorrow; Continue POC; notify primary team with any concerns/updates.       Intake/Output Summary (Last 24 hours) at 7/17/2025 1750  Last data filed at 7/17/2025 1612  Gross per 24 hour   Intake --   Output 4220 ml   Net -4220 ml       BP 93/81 (BP Location: Left arm)   Pulse 60   Temp 98  F (36.7  C) (Oral)   Resp 16   Ht 1.702 m (5' 7\")   Wt 74.5 kg (164 lb 3.2 oz)   SpO2 98%   BMI 25.72 kg/m        "

## 2025-07-17 NOTE — CARE PLAN
Hours of Care: 3545-3602    Neuro: A&Ox4. Calls appropriately. Denies headache and dizziness.   Cardiac: Afebrile, VSS. HM 3 LVAD numbers WNL. SR, HRs in the 60s-70s, with intermittent atrial pacing.  Respiratory: Room air; dyspnea on exertion  GI/: Voiding spontaneously via urinal. LBM 7/15/25.   Diet/appetite: Mechanical/dental soft diet. Denies nausea.  Activity: Independent/SBA  Pain: Denies   Skin: No new deficits noted. Driveline site; dressing changed this afternoon.  Lines: R PIV SL; right single lumen PICC placed today.    Plan: Discharge to home x outpatient infusion clinic x TCU (see care doordinator's note). Contact CARDS 2 with questions or concerns.

## 2025-07-17 NOTE — PHARMACY-VANCOMYCIN DOSING SERVICE
Pharmacy Vancomycin Note  Date of Service 2025  Patient's  1950   75 year old, male    Indication: Skin and Soft Tissue Infection - driveline infection   Day of Therapy: 3  Current vancomycin regimen:  1250 mg IV q24h  Current vancomycin monitoring method: AUC  Current vancomycin therapeutic monitoring goal: 400-600 mg*h/L    InsightRX Prediction of Current Vancomycin Regimen  Loading dose: N/A  Regimen: 1250 mg IV every 24 hours.  Start time: 11:17 on 2025  Exposure target: AUC24 (range) 400-600 mg/L.hr   AUC24,ss: 377 mg/L.hr  Probability of AUC24 > 400: 35 %  Ctrough,ss: 10 mg/L  Probability of Ctrough,ss > 20: %  Probability of nephrotoxicity (Lodise KARLA ): 6 %      Current estimated CrCl = Estimated Creatinine Clearance: 70.8 mL/min (based on SCr of 0.95 mg/dL).    Creatinine for last 3 days  2025: 11:31 AM Creatinine 1.13 mg/dL  7/15/2025:  5:54 AM Creatinine 1.21 mg/dL  2025:  6:19 AM Creatinine 1.11 mg/dL  2025:  5:38 AM Creatinine 0.95 mg/dL    Recent Vancomycin Levels (past 3 days)  2025:  5:38 AM Vancomycin 10.6 ug/mL    Vancomycin IV Administrations (past 72 hours)                     vancomycin (VANCOCIN) 1,250 mg in 0.9% NaCl 250 mL intermittent infusion (mg) 1,250 mg New Bag 25 1117    vancomycin (VANCOCIN) 1,750 mg in sodium chloride 0.9 % 500 mL intermittent infusion (mg) 1,750 mg Given 07/15/25 1121                    Nephrotoxins and other renal medications (From now, onward)      Start     Dose/Rate Route Frequency Ordered Stop    25 1000  vancomycin (VANCOCIN) 1,250 mg in 0.9% NaCl 250 mL intermittent infusion         1,250 mg  over 90 Minutes Intravenous EVERY 24 HOURS 07/15/25 0911                 Contrast Orders - past 72 hours (72h ago, onward)      Start     Dose/Rate Route Frequency Stop    25 1455  iopamidol (ISOVUE-370) solution 104 mL         104 mL Intravenous ONCE 25 1503            Interpretation of levels and  current regimen:  Vancomycin level is reflective of AUC less than 400    Has serum creatinine changed greater than 50% in last 72 hours: Yes    Urine output: improved from 0.98 mL/kg/hr to 1.6 mL/kg/hr over last 24 hours    Renal Function: Improving    InsightRX Prediction of Planned New Vancomycin Regimen  Loading dose: N/A  Regimen: 1500 mg IV every 24 hours.  Start time: 10:00 on 07/17/2025  Exposure target: AUC24 (range) 400-600 mg/L.hr   AUC24,ss: 450 mg/L.hr  Probability of AUC24 > 400: 78 %  Ctrough,ss: 12 mg/L  Probability of Ctrough,ss > 20: %  Probability of nephrotoxicity (Lodise KARLA 2009): 7 %      Plan:  Increase Dose to 1500 mg every 24 hours per pharmacokinetics above  Vancomycin monitoring method: AUC  Vancomycin therapeutic monitoring goal: 400-600 mg*h/L  Pharmacy will check vancomycin levels as appropriate in 1-3 Days or when renal function changes  Serum creatinine levels will be ordered daily for the first week of therapy and at least twice weekly for subsequent weeks.    Hermelinda Carranza, PharmD  PGY2 Pharmacy Resident

## 2025-07-17 NOTE — PLAN OF CARE
NURSING PROGRESS NOTE  Shift Summary      Date: July 17, 2025     Neuro/Musculoskeletal:  A&Ox4.   Cardiac:  100% A-paced.  VSS.  HM 3 LVAD  Respiratory:  Sating in the 90s on RA.  GI/:  Adequate urine output.  LBM: Today bloody clots but small. States not new.  Diet/Appetite:  Tolerating Mechanical soft (Dental) diet.  Activity: Independent   Pain:  Denies.   Skin:  No new deficits noted. Driveline infection  LDAs + Drips/IVF:  Double Lumen PICC R SL  Protocols/Labs: Per orders    Pertinent Shift Updates:  Uneventful night with no acute changes      Plan:  Continue with POC and report changes to team      Derrick Taveras RN  .................................................... July 17, 2025   6:16 AM  [facility] ([facility]): [facility] ICU (Unit 6D)    Problem: Adult Inpatient Plan of Care  Goal: Optimal Comfort and Wellbeing  Outcome: Progressing  Intervention: Monitor Pain and Promote Comfort  Recent Flowsheet Documentation  Taken 7/17/2025 0543 by Derrick Taveras RN  Pain Management Interventions:   MD notified (comment)   medication (see MAR)  Taken 7/16/2025 2353 by Derrick Taveras RN  Pain Management Interventions:   MD notified (comment)   medication (see MAR)  Taken 7/16/2025 1949 by Derrick Taveras RN  Pain Management Interventions:   MD notified (comment)   medication (see MAR)  Intervention: Provide Person-Centered Care  Recent Flowsheet Documentation  Taken 7/17/2025 0000 by Derrick Taveras RN  Trust Relationship/Rapport:   choices provided   care explained   empathic listening provided   emotional support provided  Taken 7/16/2025 1949 by Derrick Taveras RN  Trust Relationship/Rapport:   choices provided   care explained   empathic listening provided   emotional support provided   Goal Outcome Evaluation:      Plan of Care Reviewed With: patient    Overall Patient Progress: improvingOverall Patient Progress: improving

## 2025-07-17 NOTE — PROGRESS NOTES
Care Management Follow Up    Length of Stay (days): 3    Expected Discharge Date: 07/17/2025     Concerns to be Addressed: denies needs/concerns at this time     Patient plan of care discussed at interdisciplinary rounds: Yes    Anticipated Discharge Disposition:  home with spouse              Anticipated Discharge Services:    Anticipated Discharge DME:      Patient/family educated on Medicare website which has current facility and service quality ratings:    Education Provided on the Discharge Plan:    Patient/Family in Agreement with the Plan:      Referrals Placed by CM/SW: Home Infusion  Private pay costs discussed: insurance costs out of pocket expenses, co-pays, and deductibles    Discussed  Partnership in Safe Discharge Planning  document with patient/family: Yes: patient and spouse     Handoff Completed: No, handoff not indicated or clinically appropriate    Additional Information:  Patient to discharge today, RNCC coordinating infusion center IV abx, [facility] ([place]) and [facility] for IV vancomycin, line cares [facility] is agreeable to take on line cares. Additionally pt is an INR lab draw, Hgb and CMP also needed, [facility] lab appt scheduled, pt's wife Melissa silva, knows this will be found in Mychart as well. RNCC to sign off after discharge.    [facility]  [place], [place]  [phone]  IV abx and labs (INR), 9:15 a.m. to start on 7/18/25, will do line cares, schedule weekdays, 2:30 p.m. Monday also.       [facility]  [facility]  [address] [place] [id number]  tel:[phone]  Charge RN: [phone]  Weekends for IV abx (through 8/4), 12 p.m. on Saturday 7/19, 1 p.m. Sunday 7/20.    Patient to follow up with LVAD ID clinic, appt is set for 8/4/25, end date of IV abx.     RNCC to sign off after discharge.    Jaylon Aguila, SANDIEN, BA, RN, CMSRN  6C ([address]eds [id number] and [id number]) Nurse Care  Coordinator   [facility] Acute Care Management  Phone: [phone]  Available on Vocera: Jaylon Aguila

## 2025-07-17 NOTE — PROGRESS NOTES
The patient's HeartMate LVAD was interrogated 2025  Heartmate 3 LEFT VS  Flow (Lpm): 3 Lpm  Pulse Index (PI): 6.5 PI  Speed (rpm): 5000 rpm  Power (jackson): 3.2 jackson  Current Hct settin  Fluid status: near euvolemic   Alarms were reviewed, and notable for frequent pi events, history goes back just over 2 days, no alarms  The driveline exit site was inspected, c/d/i.   All external components were inspected and showed no evidence of damage or malfunction, none replaced.   No changes to VAD settings made

## 2025-07-17 NOTE — CONSULTS
Colon and Rectal Surgery Consultation Note  [facility]    Duane C Johnson MRN# [id number]   Age: 75 year old YOB: 1950     Date of Admission:  7/14/2025    Reason for consult: h/o radiation proctitis and gets intermittent topical formalin treatment with colorectal surgery- now having rectal bleeding again inpatient- is it possible to repeat treatment while inpatient        Requesting physician: Cristobal Laurent MD       Level of consult: Consult and follow for daily recommendations             History of Present Illness:   CC: Rectal bleeding    History was obtained from the patient.    Patient is a 76 y/o M who is known to [facility]. He has a hx of prostate cancer s/p radiation 06/2024 and subsequent radiation proctitis with intermittent rectal bleeding. Other significant PMHx includes CAD, systolic heart failure, STEMI, and ventricular fibrillation with LVAD. He is on chronic anticoagulation with warfarin. He underwent outpatient topical formalin treatment on 6/18/25 and was scheduled to have this repeated, however he had to cancel his appointment due to hospital admission.    He was admitted to Cardiology on 7/14 after presenting to the ED with increased weakness, dizziness, episodes of emesis, and ongoing driveline drainage despite oral antibiotic treatment. Patient reports a single episode of BRBPR unrelated to having a BM on 7/16. He is unsure if there were any clots. It was not painful. He has had no further bleeding since.         Assessment:     He is hemodynamically and clinically stable with slight decrease in Hgb (8.3 from 8.9) following this bleeding episode. 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 Per CRS clinic notes, carafate enemas were effective for reducing bleeding in the past.         Recommendations:   -  carafate enema while inpatient  - recommend starting daily metamucil  - if additional bleeding while hospitalized, can consider APC   - follow-up back in CRS clinic on an outpatient basis           Past Medical History:     Past Medical History:   Diagnosis Date    Benign essential hypertension 1/26/2016    CAD (coronary artery disease)     Chronic systolic heart failure (H)     Hypertension     ST elevation myocardial infarction (STEMI), unspecified artery (H) 10/26/2023    Ventricular fibrillation (H)              Past Surgical History:     Past Surgical History:   Procedure Laterality Date    ANESTHESIA CARDIOVERSION N/A 09/05/2024    Procedure: Anesthesia cardioversion;  Surgeon: GENERIC ANESTHESIA PROVIDER;  Location: [facility] OR    ANESTHESIA CARDIOVERSION N/A 09/30/2024    Procedure: Anesthesia cardioversion;  Surgeon: GENERIC ANESTHESIA PROVIDER;  Location: [facility] OR    COLONOSCOPY N/A 03/23/2023    Procedure: COLONOSCOPY, FLEXIBLE, WITH LESION REMOVAL USING SNARE;  Surgeon: Jose Faustin MD;  Location: [facility]    CV CENTRAL VENOUS CATHETER PLACEMENT N/A 10/26/2023    Procedure: Central Venous Catheter Placement;  Surgeon: Rob Lyles MD;  Location:  HEART CARDIAC CATH LAB    CV CORONARY ANGIOGRAM N/A 10/27/2023    Procedure: Coronary Angiogram;  Surgeon: Rob yLles MD;  Location:  HEART CARDIAC CATH LAB    CV CORONARY ANGIOGRAM N/A 10/26/2023    Procedure: Coronary Angiogram;  Surgeon: Rob Lyles MD;  Location:  HEART CARDIAC CATH LAB    CV LEFT HEART CATH N/A 10/26/2023    Procedure: Left Heart Catheterization;  Surgeon: Rob Lyles MD;  Location:  HEART CARDIAC CATH LAB    CV PCI N/A 10/27/2023    Procedure: Percutaneous Coronary Intervention;  Surgeon: Rob Lyles MD;  Location:  HEART CARDIAC CATH LAB    CV PCI STENT DRUG ELUTING N/A 10/26/2023    Procedure: Percutaneous Coronary Intervention Stent;  Surgeon: Rob Lyles MD;   Location:  HEART CARDIAC CATH LAB    CV RIGHT HEART CATH MEASUREMENTS RECORDED N/A 05/06/2024    Procedure: Heart Cath Right Heart Cath;  Surgeon: Rob Lyles MD;  Location:  HEART CARDIAC CATH LAB    CV RIGHT HEART CATH MEASUREMENTS RECORDED N/A 09/03/2024    Procedure: Right Heart Catheterization;  Surgeon: Aaron Mesa MD;  Location:  HEART CARDIAC CATH LAB    CV RIGHT HEART CATH MEASUREMENTS RECORDED N/A 10/24/2024    Procedure: Right Heart Catheterization;  Surgeon: Haile Braden MD;  Location:  HEART CARDIAC CATH LAB    CV RIGHT HEART CATH MEASUREMENTS RECORDED N/A 03/18/2025    Procedure: Right Heart Catheterization with VAD Speed Optimization (no echo);  Surgeon: Aaron Mesa MD;  Location:  HEART CARDIAC CATH LAB    EP ICD INSERT SINGLE N/A 05/08/2024    Procedure: Implantable Cardioverter Defibrillator Device & Lead Implant Dual;  Surgeon: Guero Black MD;  Location:  HEART CARDIAC CATH LAB    INJECTION, HYDROGEL SPACER N/A 04/22/2024    Procedure: INJECTION, HYDROGEL SPACER AND FIDUCIAL MARKER PLACEMENT;  Surgeon: Stanislaw Barros MD;  Location: [facility]    INSERT VENTRICULAR ASSIST DEVICE LEFT (HEARTMATE II) N/A 09/18/2024    Procedure: Median Sternotomy, INSERTION of LEFT VENTRICULAR ASSIST DEVICE (HEARTMATE III), cardiopulmonary bypass, transesophageal echocardiogram performed by anesthesia.;  Surgeon: Mulvihill, Michael, MD;  Location: [facility]    IRRIGATION AND DEBRIDEMENT CHEST WASHOUT, COMBINED Right 09/18/2024    Procedure: Exploration of chest, chest washout;  Surgeon: Mulvihill, Michael, MD;  Location: [facility]    PICC DOUBLE LUMEN PLACEMENT Right 07/16/2025    Basilic Vein 5F DL 39 cm, 1 cm external catheter length    SIGMOIDOSCOPY FLEXIBLE N/A 02/05/2025    Procedure: Sigmoidoscopy flexible;  Surgeon: Jp Cartwright MD;  Location:  GI             Social History:     Social History     Socioeconomic History    Marital status:       Spouse name: Not on file    Number of children: Not on file    Years of education: Not on file    Highest education level: Not on file   Occupational History    Not on file   Tobacco Use    Smoking status: Former     Current packs/day: 0.00     Average packs/day: 0.3 packs/day for 30.0 years (7.5 ttl pk-yrs)     Types: Cigarettes     Start date: 10/26/1993     Quit date: 10/26/2023     Years since quittin.7     Passive exposure: Past    Smokeless tobacco: Never    Tobacco comments:     Quit 10/26/2023   Vaping Use    Vaping status: Never Used   Substance and Sexual Activity    Alcohol use: Yes     Comment: 1-2 beers per day    Drug use: No    Sexual activity: Yes     Partners: Female     Birth control/protection: None   Other Topics Concern    Parent/sibling w/ CABG, MI or angioplasty before 65F 55M? No   Social History Narrative    Not on file     Social Drivers of Health     Financial Resource Strain: Low Risk  (7/15/2025)    Financial Resource Strain     Within the past 12 months, have you or your family members you live with been unable to get utilities (heat, electricity) when it was really needed?: No   Food Insecurity: Low Risk  (7/15/2025)    Food Insecurity     Within the past 12 months, did you worry that your food would run out before you got money to buy more?: No     Within the past 12 months, did the food you bought just not last and you didn t have money to get more?: No   Transportation Needs: Low Risk  (7/15/2025)    Transportation Needs     Within the past 12 months, has lack of transportation kept you from medical appointments, getting your medicines, non-medical meetings or appointments, work, or from getting things that you need?: No   Physical Activity: Inactive (2023)    Exercise Vital Sign     Days of Exercise per Week: 0 days     Minutes of Exercise per Session: 0 min   Stress: Not on file   Social Connections: Unknown (2023)    Social Connection and Isolation  Panel [NHANES]     Frequency of Communication with Friends and Family: Not on file     Frequency of Social Gatherings with Friends and Family: Not on file     Attends Adventism Services: Not on file     Active Member of Clubs or Organizations: Not on file     Attends Club or Organization Meetings: Not on file     Marital Status:    Interpersonal Safety: High Risk (7/15/2025)    Interpersonal Safety     Do you feel physically and emotionally safe where you currently live?: No     Within the past 12 months, have you been hit, slapped, kicked or otherwise physically hurt by someone?: No     Within the past 12 months, have you been humiliated or emotionally abused in other ways by your partner or ex-partner?: No   Housing Stability: Low Risk  (7/15/2025)    Housing Stability     Do you have housing? : Yes     Are you worried about losing your housing?: No             Family History:     Family History   Problem Relation Age of Onset    Other Cancer Mother     Obesity Mother     GI problems Father     Uterine Cancer Sister              Allergies:      Allergies   Allergen Reactions    Brilinta [Ticagrelor] Other (See Comments) and Difficulty breathing     Per pt and spouse, hyperventilation.    Clonazepam Other (See Comments)     Per spouse, acted like a zombie and he was shaky, could barely talk.             Medications:     Current Facility-Administered Medications   Medication Dose Route Frequency Provider Last Rate Last Admin    acetaminophen (TYLENOL) tablet 650 mg  650 mg Oral Q4H PRN Sirisha Arias APRN CNP        amiodarone (PACERONE) tablet 200 mg  200 mg Oral Daily Cristobal Laurent MD   200 mg at 07/17/25 0907    amLODIPine (NORVASC) tablet 5 mg  5 mg Oral QPM Cristobal Laurent MD   5 mg at 07/16/25 1953    atorvastatin (LIPITOR) tablet 80 mg  80 mg Oral QPM Sirisha Arias APRN CNP   80 mg at 07/16/25 1953    digoxin (LANOXIN) tablet 62.5 mcg  62.5 mcg Oral Daily Sirisha Arias APRN CNP    62.5 mcg at 07/17/25 0907    escitalopram (LEXAPRO) tablet 10 mg  10 mg Oral or Feeding Tube Daily Sirisha Arias APRN CNP   10 mg at 07/17/25 0907    gabapentin (NEURONTIN) capsule 100 mg  100 mg Oral or Feeding Tube At Bedtime Sirisha Arias APRN CNP   100 mg at 07/16/25 2153    heparin lock flush 10 unit/mL injection 5-15 mL  5-15 mL Intracatheter Q24H Torrie Forrest PA-C   5 mL at 07/16/25 1823    heparin lock flush 10 unit/mL injection 5-15 mL  5-15 mL Intracatheter Q1H PRN Torrie Forrest PA-C        lidocaine (LMX4) cream   Topical Q1H PRN Torrie Forrest PA-C        lidocaine (LMX4) cream   Topical Q1H PRN Sirisha Arias APRN CNP        lidocaine 1 % 0.1-1 mL  0.1-1 mL Other Q1H PRN Sirisha Arias APRN CNP        lidocaine 1 % 0.1-5 mL  0.1-5 mL Other Q1H PRN Torrie Forrest PA-C   3 mL at 07/16/25 1628    magnesium hydroxide (MILK OF MAGNESIA) suspension 30 mL  30 mL Oral Daily PRN Sirisha Arias APRN CNP        magnesium oxide (MAG-OX) tablet 400 mg  400 mg Oral QPM Cristobal Laurent MD   400 mg at 07/16/25 1953    medication instruction   Does not apply Continuous PRN Sirisha Arias APRN CNP        melatonin tablet 10 mg  10 mg Oral At Bedtime Sirisha Arias APRN CNP   10 mg at 07/16/25 1953    multivitamin w/minerals (THERA-VIT-M) tablet 1 tablet  1 tablet Oral Daily Sirisha Arias APRN CNP   1 tablet at 07/17/25 0907    Patient is already receiving anticoagulation with heparin, enoxaparin (LOVENOX), warfarin (COUMADIN)  or other anticoagulant medication   Does not apply Continuous PRN Sirisha Arias APRN CNP        psyllium (METAMUCIL/KONSYL) Packet 1 packet  1 packet Oral Daily Sirisha Arias APRN CNP   1 packet at 07/17/25 0907    sodium chloride (PF) 0.9% PF flush 10-20 mL  10-20 mL Intracatheter q1 min prn Torrie Forrest PA-C        sodium chloride (PF) 0.9% PF flush 10-40 mL  10-40 mL Intracatheter Q8H Torrie Forrest PA-C   10 mL at 07/17/25 0923  "   sodium chloride (PF) 0.9% PF flush 3 mL  3 mL Intracatheter Q8H KATIUSKA Sirisha Arias APRN CNP   3 mL at 07/17/25 0543    sodium chloride (PF) 0.9% PF flush 3 mL  3 mL Intracatheter q1 min prn Sirisha Arias APRN CNP        vancomycin (VANCOCIN) 1,500 mg in 0.9% NaCl 250 mL intermittent infusion  1,500 mg Intravenous Q24H Cristobal Laurent MD   1,500 mg at 07/17/25 0918    Warfarin Dose Required Daily - Pharmacist Managed  1 each Oral See Admin Instructions Sirisha Arias APRN CNP                 Review of Systems:      All other review of systems negative, except for what is mentioned above        Physical Exam:   BP 93/81 (BP Location: Left arm)   Pulse 60   Temp 98  F (36.7  C) (Oral)   Resp 16   Ht 1.702 m (5' 7\")   Wt 74.5 kg (164 lb 3.2 oz)   SpO2 98%   BMI 25.72 kg/m    General: Alert, interactive, NAD  Perineal: No active bleeding, no erythema or edema, no fissuring, no external hemorrhoids or skin tags noted, no prolapse            Data:     Lab Results   Component Value Date    WBC 3.8 (L) 07/17/2025    WBC 3.4 (L) 07/16/2025    WBC 4.1 07/15/2025    HGB 8.3 (L) 07/17/2025    HGB 8.9 (L) 07/16/2025    HGB 8.7 (L) 07/15/2025    HCT 25.5 (L) 07/17/2025    HCT 26.9 (L) 07/16/2025    HCT 26.2 (L) 07/15/2025    MCV 91 07/17/2025    MCV 91 07/16/2025    MCV 91 07/15/2025     07/17/2025     07/16/2025     07/15/2025     Lab Results   Component Value Date    CR 0.95 07/17/2025    CR 1.11 07/16/2025    CR 1.21 (H) 07/15/2025     Lab Results   Component Value Date    INR 2.17 (H) 07/17/2025    INR 1.85 (H) 07/16/2025    INR 1.74 (H) 07/15/2025          Plan of care discussed with Dr. Stanislaw Sharp, CRS Fellow and Dr. Luca Gonzales, CRS Attending Physician.    Fariba Alexis MD, PhD  General Surgery, PGY1  Pager: [phone]    "

## 2025-07-17 NOTE — PROGRESS NOTES
[facility]   Cardiology II Service / Advanced Heart Failure  Daily Progress Note      Patient: Duane C Johnson  MRN: [id number]  Admission Date: 7/14/2025  Hospital Day # 3    Assessment and Plan: Duane C Johnson is a 75 year old male with a past medical history including CAD with history of anterior STEMI s/p PCI to the pLAD on 10/26/23 with a VT/VF arrest the next day (10/27), multiple subsequent admissions for HF exacerbation, with HFrEF 2/2 ICM s/p HM3 LVAD c/b frequent low atilio alarms s/p low-flow controller. He was recently started on oral antibiotics for driveline infection and is now presenting with increased weakness, dizziness, episodes of emesis, and ongoing driveline drainage.     Today's Plan:  - Colorectal surgery consult for rectal bleeding- gets outpatient topical formalin treatment for radiation proctitis, ? Need for repeat treatment  - Continue IV vanc, pharmacy to dose, needs to be on through 8/4  - S/p PICC placement on 7/16  - Ongoing eval for how to do IV abx post discharge in lamar of insurance coverage for IV antibiotics: private pay vs infusions at [facility] vs TCU  - Repeat driveline cx now growing corynebacterium striatum as well- we have added linezolid on to these cultures as we were not able to on the 7/2 cultures  - Increase Gatorade intake to closer to 60 ounces daily, appreciate dietician assistance    # Uncomplicated LVAD driveline infection, corynebacterium striatum  This is is first documented driveline infection. Seen in clinic 7/2/25 with reports of ongoing driveline drainage x 1 month. Driveline cultures + corynebacterium striatum. Started on doxy empirically, d/t antibiogram was switched to Bactrim DS BID when the culture speciated, but then with sensitives ultimately resistant to both doxycycline and bactrim. He did have some clinical improvement on the PO antibiotics, but did not tolerate PO bactrim d/t lethargy/nausea/vomiting. He was switched to IV  vancomycin. CT abdomen/pelvis with contrast with some fat stranding but no fluid collection  - Appreciate ID consult   - CVTS consult for lack of driveline seal, increased infection risk, eval for utility of driveline suture- they don't recommend intervention at this time    - Repeat driveline cx now growing corynebacterium striatum as well- we have added linezolid on to these cultures as we were not able to on the 7/2 cultures  - Now on IV vancomycin, dose by pharmacy, planned through 8/4/25  - Ongoing eval for how to do IV abx post discharge in lamar of insurance coverage for IV antibiotics: private pay vs infusions at [facility] vs TCU  - On discharge, will need Creatinine, AST/ALT, CBC with diff, and Vancomycin level. Dr. Sarah will follow labs at discharge until ID follow up. Fax labs to ID clinic.  - Will need LVAD ID clinic visit on 8/4/25  - Continue daily LVAD dressing changes      # Chronic systolic heart failure secondary to ICM s/p HM3 LVAD as DT on 9/18/24.    Stage C. NYHA Class III.  Fluid status mild hypovolemia- doing well with aggressive fluid and salt intake, not on a loop diuretic. No fluid restriction. His goal is to drink about 64 ounces of gatorade daily and 128 ounces of fluid total  ACEi/ARB: none given frequent hypotension  Afterload- continue PTA amlodipine 5 mg daily  BB: none given frequent hypotension   Aldosterone antagonist: avoiding d/t frequent dehydration  SGLT2i: avoiding d/t frequent dehydration  SCD prophylaxis ICD  NSAID use: contraindicated  BP: MAP goal 65-85  LDH trends: 194, stable  Anticoagulation: warfarin, INR goal 2.0-2.5 given periodic rectal bleeding with radiation proctitis. Today's INR 2.17, dosing per pharmacy.  Antiplatelet: N/A  Other: Continue mag ox 400 mg daily     # Low flow alarms, resolved  # Has a LOW FLOW CONTROLLER  # Labile MAPs   # Suspected orthostatic hypotension, resolved  Low flows seems releated to hypertension and some hypovolemia as well. Have  improved with re-timing hydralazine and after increasing oral fluids. CTA was done on  10/17/24 with no outflow graft ostruction. The outflow graft does make an acute angle as it approaches the aorta. Dr. Smith has reviewed inflow and outflow positioning, nothing to do . Speed was dropped to 5000 in oct 2024 which helped as well as stopping losartan and sglt2i as above.   - Speed set at 5000- decreased speed in Oct 2024 helped low-flow alarms  - Off all medications with a diuretic effect  - Other BP med management as above  - Previously stopped fludrocortisone 0.1 mg daily   - Has a LOW FLOW controller, will not alarm until flow is </= 2  - Continue to push fluids, including gatorade, salty snacks, etc      # History of VT/VF arrest 2023 2/2 STEMI  # AFib s/p DCCV 9/2024 and again 9/30/2024   Has had multiple attempted cardioversions for a. fib. Eventually planned for just rate control, so no further cardioversions were planned, but now back in sinus.   - ICD 6/27/25 without any atrial fibrillation since the last icd check, continue checks per protocol   - Continue amiodarone 200 mg daily  - Continue digoxin 62.5 mcg daily   - AC as above     # Rectal bleeding  # Acute blood loss anemia  Has had episodes of rectal bleeding. He has seen GI and colorectal outpatient. Has been told this is due to his radiation proctitis. Hgb has dropped in the last 2 months, presently stable ~ 9. No further episodes. Had not had bleeding since early July, but now an episode of bleeding on 7/16.  - Colorectal surgery consult for rectal bleeding- gets outpatient topical formalin treatment for radiation proctitis, ? Need for repeat treatment  - Daily CBC for now, consider spacing out  - Recent iron studies iron 30, iron sat 13. He would benefit from IV iron, however will defer until driveline infection improving and near completion of treatment    # CAD w/h/o anterior STEMI s/p pLAD stent in 2023  - On coumadin, no asa given on  coumadin  - BB deferred as above  - Continue atorvastatin 80 mg daily     # Dental concerns. Has no teeth. Needs dentures or alternative planning but has been told that he cannot have that until his abnormal bone growith along lower jaw is addressed. He has seen local dentists and oral surgeons but has been told his complexity is above their clinics ability  - Refer to oral surgery at [facility] at discharge      # Cognitive changes  - Needs formal driving assessment with DMV or OT prior to being cleared to drive, wife aware and also discussed with patient at time of discharge- we discussed this again today. Would defer for at least another 3 months with ongoing medical issues  - Repeat neuropsych testing in 1 year post discharge from index admission  - Referred to behavioral neurology in place     # Inguinal hernia, can self-reduce. No pain, but irritating and uncomfortable. Recently referred to general surgery, was scheduled to see in clinic on 7/14, but will need to reschedule due to hospital admission.      FEN: regular  PROPHY:  warfarin   LINES:  PIV  DISPO:  2-5 days   CODE STATUS:  Full Code    Torrie Forrest PA-C  Advanced Heart Failure/Cardiology II Service  Vocera preferred, or pager [phone]      Patient discussed with Dr. Laurent.        60 minutes spent on the date of the encounter doing chart review, history and exam, documentation and further activities per the note    ================================================================    Subjective/24-Hr Events:   Last 24 hr care team notes reviewed. He is feeling very well. He walked at least 5 laps yesterday. No lightheadedness. No low flow alarms. No more nausea. No vomitting or diarrhea. He is tolerating PO intake well. No pain around the driveline. He did have an episode of rectal bleeding yesterday. No swelling.     ROS:  4 point ROS including respiratory, CV, GI and  (other than that noted in the HPI) is negative.     Medications: Reviewed in  "EPIC.     Physical Exam:   BP 93/81 (BP Location: Left arm)   Pulse 62   Temp 97.7  F (36.5  C) (Oral)   Resp 16   Ht 1.702 m (5' 7\")   Wt 74.5 kg (164 lb 3.2 oz)   SpO2 98%   BMI 25.72 kg/m      GENERAL: Appears comfortable, in no distress .  HEENT: Eye symmetrical, no discharge or icterus bilaterally. ple, JVD <6.   CV: RRR, +S1S2, no murmur, rub, or gallop.   RESPIRATORY: Respirations regular, even, and unlabored. Lungs CTA throughout.    GI: Soft and non distended   EXTREMITIES: No peripheral edema. All extremities are warm ane well perfused  NEUROLOGIC: Alert and interacting appropriatlye  SKIN: No jaundice. No rashes or lesions.  Drivelien  dressing c/d/i    Labs:  CMP  Recent Labs   Lab 07/17/25  0538 07/16/25  0619 07/15/25  0554 07/14/25  1131   * 131* 127* 128*   POTASSIUM 4.2 4.2 4.3 4.7   CHLORIDE 97* 101 95* 95*   CO2 24 22 21* 21*   ANIONGAP 8 8 11 12   GLC 85 86 96 108*   BUN 13.6 12.8 15.4 15.4   CR 0.95 1.11 1.21* 1.13   GFRESTIMATED 83 69 62 68   PRANAV 8.5* 8.5* 8.5* 9.0   PROTTOTAL  --   --   --  7.1   ALBUMIN  --   --   --  4.0   BILITOTAL  --   --   --  0.3   ALKPHOS  --   --   --  109   AST  --   --   --  79*   ALT  --   --   --  64       CBC  Recent Labs   Lab 07/17/25  0538 07/16/25  0619 07/15/25  0554 07/14/25  1131   WBC 3.8* 3.4* 4.1 4.2   RBC 2.81* 2.95* 2.88* 3.02*   HGB 8.3* 8.9* 8.7* 9.0*   HCT 25.5* 26.9* 26.2* 27.3*   MCV 91 91 91 90   MCH 29.5 30.2 30.2 29.8   MCHC 32.5 33.1 33.2 33.0   RDW 15.2* 15.6* 15.3* 15.5*    175 175 213       INR  Recent Labs   Lab 07/17/25  0538 07/16/25  0619 07/15/25  0554 07/14/25  1131   INR 2.17* 1.85* 1.74* 1.52*                 "

## 2025-07-18 ENCOUNTER — APPOINTMENT (OUTPATIENT)
Dept: OCCUPATIONAL THERAPY | Facility: CLINIC | Age: 75
DRG: 315 | End: 2025-07-18
Payer: MEDICARE

## 2025-07-18 VITALS
OXYGEN SATURATION: 100 % | TEMPERATURE: 97.9 F | WEIGHT: 161 LBS | HEART RATE: 65 BPM | RESPIRATION RATE: 18 BRPM | DIASTOLIC BLOOD PRESSURE: 81 MMHG | BODY MASS INDEX: 25.27 KG/M2 | SYSTOLIC BLOOD PRESSURE: 93 MMHG | HEIGHT: 67 IN

## 2025-07-18 LAB
ANION GAP SERPL CALCULATED.3IONS-SCNC: 9 MMOL/L (ref 7–15)
BACTERIA WND CULT: ABNORMAL
BUN SERPL-MCNC: 13.7 MG/DL (ref 8–23)
CALCIUM SERPL-MCNC: 8.7 MG/DL (ref 8.8–10.4)
CHLORIDE SERPL-SCNC: 98 MMOL/L (ref 98–107)
CREAT SERPL-MCNC: 0.98 MG/DL (ref 0.67–1.17)
EGFRCR SERPLBLD CKD-EPI 2021: 80 ML/MIN/1.73M2
ERYTHROCYTE [DISTWIDTH] IN BLOOD BY AUTOMATED COUNT: 15.3 % (ref 10–15)
GLUCOSE SERPL-MCNC: 84 MG/DL (ref 70–99)
GRAM STAIN RESULT: ABNORMAL
GRAM STAIN RESULT: ABNORMAL
HCO3 SERPL-SCNC: 24 MMOL/L (ref 22–29)
HCT VFR BLD AUTO: 26.8 % (ref 40–53)
HGB BLD-MCNC: 8.6 G/DL (ref 13.3–17.7)
INR PPP: 2.49 (ref 0.85–1.15)
MCH RBC QN AUTO: 29.7 PG (ref 26.5–33)
MCHC RBC AUTO-ENTMCNC: 32.1 G/DL (ref 31.5–36.5)
MCV RBC AUTO: 92 FL (ref 78–100)
PLATELET # BLD AUTO: 188 10E3/UL (ref 150–450)
POTASSIUM SERPL-SCNC: 4.1 MMOL/L (ref 3.4–5.3)
PROTHROMBIN TIME: 26.4 SECONDS (ref 11.8–14.8)
RBC # BLD AUTO: 2.9 10E6/UL (ref 4.4–5.9)
SODIUM SERPL-SCNC: 131 MMOL/L (ref 135–145)
WBC # BLD AUTO: 4.5 10E3/UL (ref 4–11)

## 2025-07-18 PROCEDURE — 250N000011 HC RX IP 250 OP 636: Performed by: INTERNAL MEDICINE

## 2025-07-18 PROCEDURE — 97110 THERAPEUTIC EXERCISES: CPT | Mod: GO

## 2025-07-18 PROCEDURE — 97535 SELF CARE MNGMENT TRAINING: CPT | Mod: GO

## 2025-07-18 PROCEDURE — 99239 HOSP IP/OBS DSCHRG MGMT >30: CPT | Performed by: PHYSICIAN ASSISTANT

## 2025-07-18 PROCEDURE — 80048 BASIC METABOLIC PNL TOTAL CA: CPT | Performed by: NURSE PRACTITIONER

## 2025-07-18 PROCEDURE — 85610 PROTHROMBIN TIME: CPT | Performed by: NURSE PRACTITIONER

## 2025-07-18 PROCEDURE — 250N000013 HC RX MED GY IP 250 OP 250 PS 637: Performed by: NURSE PRACTITIONER

## 2025-07-18 PROCEDURE — 85014 HEMATOCRIT: CPT | Performed by: NURSE PRACTITIONER

## 2025-07-18 PROCEDURE — 250N000013 HC RX MED GY IP 250 OP 250 PS 637: Performed by: INTERNAL MEDICINE

## 2025-07-18 RX ORDER — PHENOL 1.4 %
10 AEROSOL, SPRAY (ML) MUCOUS MEMBRANE AT BEDTIME
Status: SHIPPED
Start: 2025-07-18

## 2025-07-18 RX ORDER — WARFARIN SODIUM 2.5 MG/1
TABLET ORAL
Status: SHIPPED
Start: 2025-07-21 | End: 2025-07-18

## 2025-07-18 RX ORDER — WARFARIN SODIUM 2.5 MG/1
TABLET ORAL
Status: ACTIVE
Start: 2025-07-18

## 2025-07-18 RX ADMIN — PSYLLIUM HUSK 1 PACKET: 3.4 POWDER ORAL at 08:02

## 2025-07-18 RX ADMIN — Medication 1500 MG: at 09:13

## 2025-07-18 RX ADMIN — ESCITALOPRAM OXALATE 10 MG: 10 TABLET ORAL at 08:02

## 2025-07-18 RX ADMIN — Medication 1 TABLET: at 08:02

## 2025-07-18 RX ADMIN — AMIODARONE HYDROCHLORIDE 200 MG: 200 TABLET ORAL at 08:02

## 2025-07-18 RX ADMIN — DIGOXIN 62.5 MCG: 0.06 TABLET ORAL at 08:02

## 2025-07-18 ASSESSMENT — ACTIVITIES OF DAILY LIVING (ADL)
ADLS_ACUITY_SCORE: 43
ADLS_ACUITY_SCORE: 43
ADLS_ACUITY_SCORE: 42
ADLS_ACUITY_SCORE: 43
ADLS_ACUITY_SCORE: 42
ADLS_ACUITY_SCORE: 43
ADLS_ACUITY_SCORE: 42
ADLS_ACUITY_SCORE: 43
ADLS_ACUITY_SCORE: 42
ADLS_ACUITY_SCORE: 43
ADLS_ACUITY_SCORE: 43
ADLS_ACUITY_SCORE: 42

## 2025-07-18 NOTE — PLAN OF CARE
Hx: CAD with history of anterior STEMI s/p PCI to the pLAD on 10/26/23 with a VT/VF arrest the next day (10/27), multiple subsequent admissions for HF exacerbation, with HFrEF 2/2 ICM s/p HM3 LVAD c/b frequent low atilio alarms s/p low-flow controller. He was recently started on oral antibiotics for driveline infection.     Dx: Admitted for increased weakness, dizziness, episodes of emesis, and ongoing driveline drainage.     AO X 4, a-paced, vs wdl, lvad numbers wdl, maps 80's, no alarms, on RA, adequate uop, last bm 7/17, denies pain, up ad mackenzie. Continues on IV vanco.    Plan: Discharge home when ID formulates plan.

## 2025-07-18 NOTE — PROGRESS NOTES
Care Coordinator  D/I: per Torrie Forrest, cards 2 NP says pt may discharge to home today and will need the IV Vancomycin in Outpatient Infusion. He will need on Monday, 7/21 a: CBC,LDH,CMP,INR added onto labs.  P: Per previous RN CC Jaylon MANJARREZ--pt is set up in the [facility] Ph: [phone] Alexa RADER : I updated her and he is on their schedule 7/21 12n and Sun 7/22 1pm   I called [facility] Ph: [phone] talked to DIOR Quiroz and he is on their schedule @ 2:30pm--I informed her about the lab add on and she would like Anitha to add them to the AVS under [facility] or future labs--I have vovera messaged Anitha to do this.  Pt received his IV Vanco @ 0930 this morning via R PICC DL placed on 7/16/25.  Orders are signed.

## 2025-07-18 NOTE — PHARMACY
[facility]  Parenteral ANtibiotic Review at Departure from Acute Care Collaborative Note     Patient: Duane C Johnson  MRN: [id number]  Allergies: Brilinta [ticagrelor] and Clonazepam    Current Location: [facility]  OPAT to be provided by: [facility]       Line Type: PICC    Diagnosis/Indications: LVAD driveline infection  Organism(s): Corynebacterium striatum  MRDO? Yes - other  Pending Cultures/Microbiological Tests: no      Inpatient ID involved in developing OPAT plan: Yes - discharge OPAT plan has no changes from ID provider, Dr. Roxi Sarah, OPAT plan charted on 7/16/2025    Outpatient ID Follow-up: ID OPAT Clinic Referral Placed ([facility] Ph: [phone] and Fax: [phone], For LAB RESULTS ONLY, please either fax [phone] or email [email or web address]) - appointment scheduled  Designated Provider: Dr. Roxi Sarah will follow OPAT-related labs at least until outpatient ID follow-up    Antimicrobial Regimen / Route Anticipated  Duration Start Date Stop /  Reassess Date   Vancomycin IV 1500 mg every 24 hours/IV At least 3 weeks 7/15/2025 Tentative 8/4/2025, definitive end date to be determined by ID provider     Laboratory Tests and Monitoring Frequency: CBC with Diff, SCr, ALT, AST Once Weekly    Therapeutic Drug Monitoring: Vancomycin   - Drug: Vancomycin   - Goal(s): -600   - Level Type & Frequency: Please obtain a serum vancomycin level (ideally through) at least once weekly; may increase serum vancomycin level monitoring frequency (e.g., twice weekly) with regimen changes, labile renal function, and/or addition of nephrotoxic medication   - Level(s) last checked date: 7/17/2025    Imaging/Miscellaneous Monitoring: None    ID Pharmacist Interventions: None                          Chelsey Akins, PharmD, BCIDP  Pager: [phone]

## 2025-07-18 NOTE — CONSULTS
Discharge Pharmacy Test Claim    Linezolid is covered with a copay of $47.73 for 20 tablets through patient's [facility] [employer] plan.    Test Claim Copay   linezolid 47.73     Alexa Gaona  [facility] Pharmacy Liaison, [place]  Ph: [phone]  Fax: [phone]  Available on Teams and Vocera  Disclaimer: Pharmacy test claims are estimates and may not reflect final costs. Suggested alternatives aim to be cost-effective and may not be therapeutically equivalent. This consult is informational and does not constitute medical advice. Clinical decisions should be made by qualified healthcare providers.

## 2025-07-18 NOTE — PLAN OF CARE
DISCHARGE   Discharged to: Home  Via: Automobile  Accompanied by: Wife at 1530.  Discharge Instructions: diet, activity, medications, follow up appointments, when to call the MD, and what to watchout for (i.e. s/s of infection, increasing SOB, palpitations, chest pain,)  Prescriptions: To be filled by [facility] pharmacy per pt's request; medication list reviewed & sent with pt  Follow Up Appointments: arranged; information given  Belongings: All sent with pt  IV: discharging with PICC for outpatient IV Abx infusions.  Telemetry: off  Pt exhibits understanding of above discharge instructions; all questions answered.  Discharge Paperwork: faxed

## 2025-07-18 NOTE — DISCHARGE SUMMARY
[facility]   Cardiology II Service / Advanced Heart Failure  Discharge Summary     Duane C Johnson MRN# [id number]   YOB: 1950 Age: 75 year old     DATE OF ADMISSION:  7/14/2025  DATE OF DISCHARGE: 7/18/2025  ADMITTING PROVIDER: Cristobal Laurent MD  DISCHARGE PROVIDER: Torrie Forrest Pa-C and Alonso Valentine MD   PRIMARY PROVIDER:  Jose Coles    ADMIT DIAGNOSES:   LVAD driveline infection  Chronic systolic Heart Failure Secondary to ICM s/p HM3 LVAD as DT on 9/18,24, Stage D Class III  History of low flow alarms with a LOW FLOW CONTROLLER   History of VT/VF arrest in 2023 2/2 STEMI  AFib s/p DCCV 9/2024 and again 9/30/2024   CAD w/h/o anterior STEMI s/p pLAD stent in 2023   Adentulous   Radiation proctitis with history of rectal bleeding  Cognitive changes  Inguinal hernia, non-incarcerated, non-strangulated     DISCHARGE DIAGNOSES:   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 Dr. Sarah will follow labs at discharge until ID follow up. Fax labs to ID clinic. On Tuesday, he will also get an INR and LDH followed by cards 2  []  LVAD ID appointment 8/4/25, scheduled  [] Colorectal surgery is arranging follow-up with their team for further treatment of his radiation proctitis   [] Will need IV iron - will plan to arrange for his last week or so of antibiotic therapy    PENDING RESULTS:   [] Anaerobic wound culture of the abdomen, I will follow     HPI: Please see  the detailed H & P by Dr. Laurent and  Sirisha Arias, CHICO, FNP-C from 7/14/2025. Briefly, Duane C Johnson is a 75 year old male with a past medical history including CAD with history of anterior STEMI s/p PCI to the pLAD on 10/26/23 with a VT/VF arrest the next day (10/27), multiple subsequent admissions for HF exacerbation, with HFrEF 2/2 ICM s/p HM3 LVAD c/b frequent low atilio alarms s/p low-flow controller. He was recently started on oral antibiotics for driveline infection and is now presenting with increased weakness, dizziness, episodes of emesis, and ongoing driveline drainage.     HOSPITAL COURSE:     # Uncomplicated LVAD driveline infection, corynebacterium striatum  This is is first documented driveline infection. Seen in clinic 7/2/25 with reports of ongoing driveline drainage x 1 month. Driveline cultures + corynebacterium striatum. Started on doxy empirically, d/t antibiogram was switched to Bactrim DS BID when the culture speciated, but then with sensitives ultimately resistant to both doxycycline and bactrim. He did have some clinical improvement on the PO antibiotics, but did not tolerate PO bactrim d/t lethargy/nausea/vomiting. CT abdomen/pelvis with contrast with some fat stranding but no fluid collection. CVTS was consulted for lack of driveline seal, increased infection risk, eval for utility of driveline suture- they don't recommend intervention at this time  He was seen by ID during his hospitalization. He was switched to IV vancomycin with plan to continue until 8/4/25.  - On discharge, will need Creatinine, AST/ALT, CBC with diff, and Vancomycin level. Dr. Sarah will follow labs at discharge until ID follow up. Fax labs to ID clinic.  - LVAD ID clinic visit on 8/4/25 as scheduled  - Continue daily LVAD dressing changes, recommended no showering given he does not have a seal on his driveline     # Chronic systolic heart failure secondary to ICM s/p HM3 LVAD as DT on 9/18/24.    Stage D. NYHA Class  III.  Fluid status near euvolemic- doing well with aggressive fluid and salt intake, not on a loop diuretic. No fluid restriction. His goal is to drink about 64 ounces of gatorade daily and 128 ounces of fluid total  ACEi/ARB: none given frequent hypotension  Afterload- continued PTA amlodipine 5 mg daily  BB: none given frequent hypotension   Aldosterone antagonist: avoiding d/t frequent dehydration  SGLT2i: avoiding d/t frequent dehydration  SCD prophylaxis ICD  NSAID use: contraindicated  BP: MAP goal 65-85, has been mostly within goal this admission  LDH trends: 194, stable, will recheck to next week  Anticoagulation: warfarin, INR goal 2.0-2.5 given periodic rectal bleeding with radiation proctitis. Today's INR 2.49. Coumadin dose changed to 2.5 mg three times a week (Tuesday, Thursday, Sunday) and will recheck an Inr on Monday.   Antiplatelet: N/A  Other: Continue pta mag ox 400 mg daily     # Low flow alarms, resolved  # Has a LOW FLOW CONTROLLER  # Labile MAPs   # Suspected orthostatic hypotension, resolved  Low flows seems releated to hypertension and some hypovolemia as well. Have improved with re-timing hydralazine and after increasing oral fluids. CTA was done on  10/17/24 with no outflow graft ostruction. The outflow graft does make an acute angle as it approaches the aorta. Dr. Smith has reviewed inflow and outflow positioning, nothing to do . Speed was dropped to 5000 in oct 2024 which helped as well as stopping losartan and sglt2i as above.  Speed set at 5000- decreased speed in Oct 2024 helped low-flow alarms and he is off all medications with a diuretic effect. Afterload is being managed with amlodipine. He was previously on fludrocortizone, but was stopped as an outpatient earlier in his course. Overall, we did not change any of the management related to his prior low flows- he did not have any low-flow alarms this admission.  - Has a LOW FLOW controller, will not alarm until flow is </= 2  -  Continue to push fluids, including gatorade, salty snacks, etc      # History of VT/VF arrest 2023 2/2 STEMI  # AFib s/p DCCV 9/2024 and again 9/30/2024   Has had multiple attempted cardioversions for a. fib. Eventually planned for just rate control, so no further cardioversions were planned, but now back in sinus and he remained there throughout this admission.  - Continued pta amiodarone 200 mg daily  - Continue pta digoxin 62.5 mcg daily   - AC as above     # Rectal bleeding  # Acute blood loss anemia  Has had episodes of rectal bleeding as an outpatient prior to this admission. He has seen GI and colorectal outpatient. Has been told this is due to his radiation proctitis. Hgb has dropped in the last 2 months, presently stable ~ 9.  Had not had bleeding since early July, but now an episode of bleeding on 7/16. Colorectal surgery was consulted for the rectal bleeding episode- gets outpatient topical formalin treatment for radiation proctitis. This stay- they did a carafate enema x1. Recommended outpatient fiber and they will work on arranging outpatient follow-up as well.  - Hgb check on Tuesday  - Colorectal team to arrange follow-up as discussed    # CAD w/h/o anterior STEMI s/p pLAD stent in 2023  - On coumadin, no asa given on coumadin  - BB deferred as above  - Continued pta atorvastatin 80 mg daily     # Dental concerns. Has no teeth. Needs dentures or alternative planning but has been told that he cannot have that until his abnormal bone growith along lower jaw is addressed. He has seen local dentists and oral surgeons but has been told his complexity is above their clinics ability  - Referred to oral surgery at [facility] at discharge      # Cognitive changes Noted since early in his course post operatively from the LVAD. Needs formal driving assessment with DMV or OT prior to being cleared to drive, wife aware and also discussed with patient at time of discharge- we discussed this again today. Would defer for  "at least another 3 months with ongoing medical issues. He has been recommended to get repeat neuropsych testing in 1 year post discharge from index admission and the referral to behavioral neurology is in place.     # Inguinal hernia, can self-reduce. No pain, but irritating and uncomfortable. Recently referred to general surgery, was scheduled to see in clinic on 7/14, but will need to reschedule due to hospital admission. I did resend the referal through.    # Iron deficiency, related to intermittent blood loss. Last iron suties with iron saturation of 13.  - will plan for IV iron near the end of his antibiotic course    Torrie Forrest PA-C  Advanced Heart Failure/Cardiology 2   Vocera preferred, pr Pager: [phone]    PHYSICAL EXAM:  Blood pressure 93/81, pulse 65, temperature 97.9  F (36.6  C), temperature source Oral, resp. rate 18, height 1.702 m (5' 7\"), weight 73 kg (161 lb), SpO2 100%.    GENERAL: Appears comfortable, in no distress .  HEENT: Eye symmetrical, no discharge or icterus bilaterally. ple, JVD <6.   CV: RRR, +S1S2, no murmur, rub, or gallop.   RESPIRATORY: Respirations regular, even, and unlabored. Lungs CTA throughout.    GI: Soft and non distended   EXTREMITIES: No peripheral edema. All extremities are warm ane well perfused  NEUROLOGIC: Alert and interacting appropriatlye  SKIN: No jaundice. No rashes or lesions.  Drivelien  dressing c/d/i    LABS:   Last CBC:   Recent Labs   Lab Test 07/18/25  0516   WBC 4.5   RBC 2.90*   HGB 8.6*   HCT 26.8*   MCV 92   MCH 29.7   MCHC 32.1   RDW 15.3*          Last CMP:  Recent Labs   Lab Test 07/18/25  0516 07/15/25  0554 07/14/25  1131   *   < > 128*   POTASSIUM 4.1   < > 4.7   CHLORIDE 98   < > 95*   PRANAV 8.7*   < > 9.0   CO2 24   < > 21*   BUN 13.7   < > 15.4   CR 0.98   < > 1.13   GLC 84   < > 108*   AST  --   --  79*   ALT  --   --  64   BILITOTAL  --   --  0.3   ALBUMIN  --   --  4.0   PROTTOTAL  --   --  7.1   ALKPHOS  --   --  109 "    < > = values in this interval not displayed.       IMAGING:  Results for orders placed or performed during the hospital encounter of 07/14/25   XR Chest 2 Views    Narrative    Chest 2 views    INDICATION: Short of breath    COMPARISON: CT chest 10/17/2024. Most recent plain film on PACS  10/3/2024    Heart size upper normal. Implantable cardiac defibrillator and LVAD  again noted. Median sternotomy again present. Atherosclerotic  calcification of the aortic knob. No significant change in the  parenchymal pattern of the lungs on the PA view. Lateral view shows  osteopenia. There is also descending thoracic aortic calcification.      Impression    IMPRESSION: Borderline cardiac regular with implantable cardiac  defibrillator and LVAD. Atherosclerosis.    ANGELICA PRATHER MD         SYSTEM ID:  [id number]   CT Abdomen Pelvis w Contrast    Narrative    EXAMINATION: CT ABDOMEN PELVIS W CONTRAST  7/14/2025 3:21 PM      CLINICAL HISTORY: lvad driveline infection    COMPARISON: 9/3/2024, chest radiograph 7/14/2025    PROCEDURE COMMENTS: CT of the abdomen was performed with 104 mL  Isovue-370 intravenous contrast. Coronal and sagittal reformatted  images were obtained.    FINDINGS:    LOWER THORAX:   Mild interlobular septal thickening of the bases with trace left  pleural effusion. Cardiomegaly. No pericardial effusion. Visualized  LVAD in place which appears grossly patent accounting for significant  streak artifact, no fluid collection or overlying soft tissue edema  adjacent to the apparatus. Mild soft tissue thickening and fat  stranding along the drive line catheter along the left upper quadrant  deep and superficial soft tissues (5/113-  205).Partially visualized  confluent peripheral density in the lingula may represent atelectasis  or consolidation/infection. (6/4)    LIVER: No focal hepatic mass.    BILIARY: Cholelithiasis. No intra- or extrahepatic biliary dilation.    PANCREAS: No focal pancreatic mass or  ductal dilation.    SPLEEN: No splenic mass.    ADRENAL GLANDS: Left adrenal gland thickening, similar to prior. No  adrenal nodule or mass.    URINARY TRACT: Few too small to characterize renal cortical  hypodensities, statistically favor to represent cysts. No  hydronephrosis. Parapelvic renal cysts.       REPRODUCTIVE ORGANS: No suspicious pelvic mass.    STOMACH: Within normal limits.    BOWEL: Normal caliber of the small and large bowel.  Appendix is  within normal limits.    PERITONEUM/FLUID: No focal fluid collection or evidence of free fluid  or free air.  VESSELS: No aneurysmal dilatation of the abdominal aorta.  The portal,  splenic, and superior mesenteric veins are patent.  The origins of the  celiac and superior mesenteric arteries are patent.    LYMPH NODES: No new bulky lymphadenopathy.    BONES/SOFT TISSUES: No aggressive osseous lesions. Grade 1  anterolisthesis of L5 on S1 with bilateral L5 pars defects. Fat and  bowel containing left inguinal hernia. Small fat-containing ventral  hernia. Right inguinal hernia containing part of urinary bladder.        Impression    IMPRESSION:    1. Circumferential soft tissue density thickening and mild fatty  streakiness along the subcutaneous course of left ventricular assist  device drive line along left upper abdomen, concerning for  infection/inflammation.    2. Left inguinal hernia containing fat and nondilated bowel loops    3. Cardiomegaly with mild interlobar septal thickening in the lower  lobe may represent mild pulmonary edema. Trace left pleural effusion.  Partially visualized confluent peripheral density in the lingula may  represent atelectasis or consolidation/infection.    4. Additional incidental findings are similar to prior CT from  9/3/2024, as detailed above including cholelithiasis.    I have personally reviewed the examination and initial interpretation  and I agree with the findings.    SAPNA KAHN MD         SYSTEM ID:  [id number]    XR Chest Port 1 View    Narrative    EXAM: XR CHEST PORT 1 VIEW 7/16/2025 4:50 PM    INDICATION: s/p picc    COMPARISON: Chest radiograph 7/14/2025    TECHNIQUE: Single AP view of the chest.    FINDINGS:   New right upper extremity PICC terminates at the mid SVC. Stable  position of the LVAD and drive line. Left chest combination  implantable cardiac defibrillator and pacer terminates over the right  heart. Intact sternotomy wires. Atherosclerotic calcifications of the  aortic arch. Trachea is midline.  Cardiac silhouette is normal in  size.  No focal pulmonary opacities although left lower lobe is  obscured with LVAD.  No pleural effusion or pneumothorax.  Bones and  soft tissues are unremarkable.      Impression    IMPRESSION:   1. New right upper extremity PICC terminates at the mid SVC. All  remaining cardiac devices are unchanged in position.  2. No acute airspace disease.    I have personally reviewed the examination and initial interpretation  and I agree with the findings.    PANCHITO EID MD         SYSTEM ID:  [id number]     *Note: Due to a large number of results and/or encounters for the requested time period, some results have not been displayed. A complete set of results can be found in Results Review.       PROCEDURES:  PICC line placement     CONSULTATIONS:   Infectious disease  Colorectal surgery  CVTS    DISCHARGE MEDICATIONS:  Current Discharge Medication List        START taking these medications    Details   !! melatonin 10 MG TABS tablet Take 1 tablet (10 mg) by mouth at bedtime.    Associated Diagnoses: Generalized weakness      psyllium (METAMUCIL/KONSYL) Packet Take 1 packet by mouth daily.  Qty: 30 packet, Refills: 0    Associated Diagnoses: Hematochezia      vancomycin (VANCOCIN) 1500 mg/250 mL IVPB Inject 1,500 mg over 90 minutes into the vein every 24 hours. Will be managed at [facility]    Associated Diagnoses: Sequela of Corynebacterium infection       !! -  Potential duplicate medications found. Please discuss with provider.        CONTINUE these medications which have CHANGED    Details   warfarin ANTICOAGULANT (COUMADIN/JANTOVEN) 2.5 MG tablet Take 1 table (2.5 mg) by mouth in the morning on Tuesday, Thursdays, and Sundays and 1.25 mg (1/2 tab) by mouth all other days (Monday, Wednesday, Friday, Saturday).    Associated Diagnoses: LVAD (left ventricular assist device) present (H)           CONTINUE these medications which have NOT CHANGED    Details   acetaminophen (TYLENOL) 325 MG tablet Take 2 tablets (650 mg) by mouth every 4 hours as needed for other (For optimal non-opioid multimodal pain management to improve pain control.).    Associated Diagnoses: LVAD (left ventricular assist device) present (H)      amiodarone (PACERONE) 200 MG tablet Take 1 tablet (200 mg) by mouth daily.  Qty: 90 tablet, Refills: 3    Associated Diagnoses: Transient hypotension; LVAD (left ventricular assist device) present (H); Other specified hypotension; Anticoagulated on warfarin      amLODIPine (NORVASC) 10 MG tablet Take 0.5 tablets (5 mg) by mouth daily.  Qty: 45 tablet, Refills: 3    Associated Diagnoses: Transient hypotension; LVAD (left ventricular assist device) present (H); Other specified hypotension; Anticoagulated on warfarin      atorvastatin (LIPITOR) 80 MG tablet Take 1 tablet (80 mg) by mouth every evening.  Qty: 90 tablet, Refills: 3    Associated Diagnoses: Transient hypotension; LVAD (left ventricular assist device) present (H); Other specified hypotension; Anticoagulated on warfarin      digoxin (LANOXIN) 125 MCG tablet Take 0.5 tablets (62.5 mcg) by mouth daily.  Qty: 45 tablet, Refills: 3    Associated Diagnoses: Chronic combined systolic and diastolic congestive heart failure (H); LVAD (left ventricular assist device) present (H)      escitalopram (LEXAPRO) 10 MG tablet Take 1 tablet (10 mg) by mouth or Feeding Tube daily.  Qty: 90 tablet, Refills: 3     "Associated Diagnoses: LVAD (left ventricular assist device) present (H)      gabapentin (NEURONTIN) 100 MG capsule Take 1 capsule (100 mg) by mouth or Feeding Tube at bedtime.  Qty: 90 capsule, Refills: 3    Associated Diagnoses: LVAD (left ventricular assist device) present (H)      magnesium oxide (MAG-OX) 400 MG tablet Take 1 tablet (400 mg) by mouth daily.  Qty: 90 tablet, Refills: 3    Associated Diagnoses: Hypomagnesemia      !! melatonin 10 MG TABS tablet Take 1 tablet (10 mg) by mouth every evening.    Associated Diagnoses: LVAD (left ventricular assist device) present (H)      multivitamin w/minerals (THERA-VIT-M) tablet Take 1 tablet by mouth daily.  Qty: 90 tablet, Refills: 3    Associated Diagnoses: History of left ventricular assist device (LVAD) (H)      magnesium hydroxide (MILK OF MAGNESIA) 400 MG/5ML suspension Take 30 mLs by mouth daily as needed for constipation (Use if polyethylene glycol (Miralax) is not effective after 24 hours.).  Qty: 354 mL, Refills: 0    Associated Diagnoses: Transient hypotension; LVAD (left ventricular assist device) present (H); Other specified hypotension; Anticoagulated on warfarin       !! - Potential duplicate medications found. Please discuss with provider.        STOP taking these medications       sulfamethoxazole-trimethoprim (BACTRIM DS) 800-160 MG tablet Comments:   Reason for Stopping:               DISCHARGE DISPOSITION: Duane C Johnson will discharge to home in stable condition.     DISCHARGE INSTRUCTIONS:  Discharge Procedure Orders   CBC with platelets   Standing Status: Future Standing Exp. Date: 08/18/25   Scheduling Instructions: HOVER FOR \"APPROX\" AND \"EXPECTED DATE COMMENT\" INSTUCTIONS (Read Only):      Approx. Setting:  - Allows collection from 2 weeks before Expected Date until order expiration.  - Uncheck if collection should only occur on or after Expected Date.    Expected Date Comment Button:  - Use for additional instructions for collecting " "staff (e.g., for specialty follow up, don't collect x and y on same day).  - Avoid referencing order date as \"now\" (e.g., 2 months from now).     Lactate Dehydrogenase   Standing Status: Future Standing Exp. Date: 08/18/25   Scheduling Instructions: HOVER FOR \"APPROX\" AND \"EXPECTED DATE COMMENT\" INSTUCTIONS (Read Only):      Approx. Setting:  - Allows collection from 2 weeks before Expected Date until order expiration.  - Uncheck if collection should only occur on or after Expected Date.    Expected Date Comment Button:  - Use for additional instructions for collecting staff (e.g., for specialty follow up, don't collect x and y on same day).  - Avoid referencing order date as \"now\" (e.g., 2 months from now).     INR   Standing Status: Future Standing Exp. Date: 08/18/25   Scheduling Instructions: HOVER FOR \"APPROX\" AND \"EXPECTED DATE COMMENT\" INSTUCTIONS (Read Only):      Approx. Setting:  - Allows collection from 2 weeks before Expected Date until order expiration.  - Uncheck if collection should only occur on or after Expected Date.    Expected Date Comment Button:  - Use for additional instructions for collecting staff (e.g., for specialty follow up, don't collect x and y on same day).  - Avoid referencing order date as \"now\" (e.g., 2 months from now).     Comprehensive metabolic panel   Standing Status: Future Standing Exp. Date: 08/18/25   Scheduling Instructions: HOVER FOR \"APPROX\" AND \"EXPECTED DATE COMMENT\" INSTUCTIONS (Read Only):      Approx. Setting:  - Allows collection from 2 weeks before Expected Date until order expiration.  - Uncheck if collection should only occur on or after Expected Date.    Expected Date Comment Button:  - Use for additional instructions for collecting staff (e.g., for specialty follow up, don't collect x and y on same day).  - Avoid referencing order date as \"now\" (e.g., 2 months from now).     Vancomycin level   Standing Status: Future Standing Exp. Date: 08/18/25   Scheduling " "Instructions: HOVER FOR \"APPROX\" AND \"EXPECTED DATE COMMENT\" INSTUCTIONS (Read Only):      Approx. Setting:  - Allows collection from 2 weeks before Expected Date until order expiration.  - Uncheck if collection should only occur on or after Expected Date.    Expected Date Comment Button:  - Use for additional instructions for collecting staff (e.g., for specialty follow up, don't collect x and y on same day).  - Avoid referencing order date as \"now\" (e.g., 2 months from now).     Oral Surgery Referral   Standing Status: Future   Referral Priority: Routine: Next available opening Referral Type: Consultation   Number of Visits Requested: 1     Med Therapy Management Referral   Referral Priority: Priority: 1-2 Weeks Referral Type: Med Therapy Management   Requested Specialty: Pharmacist   Number of Visits Requested: 1     Primary Care - Care Coordination Referral   Referral Priority: Routine: Next available opening Referral Type: Care Coordination   Number of Visits Requested: 1     Adult Gen Surg  Referral   Standing Status: Future   Referral Priority: Routine: Next available opening Referral Type: Consultation   Requested Specialty: Surgery   Number of Visits Requested: 1     Anticoagulation Clinic Referral     Follow-Up with Cardiology [facility] Heart Failure Discharge   Standing Status: Future   Referral Priority: Urgent: 3-5 Days Referral Type: Consultation   Requested Specialty: Cardiovascular Disease   Number of Visits Requested: 1     Reason for your hospital stay   Order Comments: You came to the hospital for a infection of your LVAD driveline site. You were treated with antibiotics     Activity   Order Comments: Your activity upon discharge: activity as tolerated     Order Specific Question Answer Comments   Is discharge order? Yes      Tubes and Drains   Order Comments: Current Tubes and Drains:     PICC Line  Duration           PICC 07/16/25 Double Lumen Right Basilic ANTIBIOTICS 1 day          "     Order Specific Question Answer Comments   If tubes and drains present: Continue at discharge      ADULT [facility] Specialty Follow-up and recommended labs and tests   Order Comments: Follow-up  - Please get your IV antibiotics (vancomycin) on Saturday and Sunday as scheduled at [address] [place] [address]) in [place]  - You will have labs on Monday with your IV antibiotic appointment: CBC, CMP, vancomycin level, LDH, and INR  - After that, please get your IV antibiotics at [place] on weekdays  - Follow-up with Infectious disease on 8/4/25  - Please reschedule your appointment with general surgery (I placed a new referal)  - Please schedule with oral surgery (a new referal was placed for them as well)  - Colorectal surgery is working on setting up follow-up with their team as well, but if you don't hear from them by Tuesday please call them to schedule follow-up    Appointments on [place] and/or [facility] (with [facility] or [facility] provider or service). Call [phone] if you haven't heard regarding these appointments within 7 days of discharge.     Monitor and record   Order Comments: Daily weights  Daily LVAD number review     Diet   Order Comments: Follow this diet upon discharge: Regular diet with at 64 ounces of gatorade daily and 128 ounces of fluid daily     Order Specific Question Answer Comments   Is discharge order? Yes      Hospital Follow-up with Existing Primary Care Provider (PCP)   Standing Status: Future Standing Exp. Date: 08/17/25   Order Comments:       Order Specific Question Answer Comments   Schedule Primary Care visit within 7 Days        120 minutes spent in discharge, including >50% in counseling and coordination of care, medication review and plan of care recommended on follow up. Questions were answered, patient agrees to plan.      Clinically Significant Risk Factors Present on Admission           # End stage heart failure: Ventricular assist device (VAD) present           .        #  Financial/Environmental Concerns: none   # ICD device present      Cardiovascular: Systolic chronic    Not present on admission

## 2025-07-18 NOTE — PHARMACY-ANTICOAGULATION SERVICE
Clinical Pharmacy- Warfarin Discharge Note  This patient is currently on warfarin for the treatment of LVAD.  INR Goal= 2-2.5  Expected length of therapy lifetime.    Warfarin PTA Regimen: 2.5mg monday and thursday, 1.25mg all other days      Anticoagulation Dose History  More data exists         Latest Ref Rng & Units 7/7/2025 7/11/2025 7/14/2025 7/15/2025 7/16/2025 7/17/2025 7/18/2025   Recent Dosing and Labs   warfarin ANTICOAGULANT (COUMADIN/JANTOVEN) 1.25 mg TABS half-tab - - - - - - 1.25 mg, $Given -1.25 mg (to be given)   warfarin ANTICOAGULANT (COUMADIN/JANTOVEN) 2.5 MG tablet - - - 2.5 mg, $Given 2.5 mg, $Given 2.5 mg, $Given - -   INR 0.85 - 1.15 1.62  1.68  1.75  1.52  1.74  1.85  2.17  2.49        Vitamin K doses administered during the last 7 days: none    Recommend discharging the patient on a warfarin regimen of 1.25 mg on 7/18, 7/19, and 2.5 mg on 7/20 (with 2.5 mg every TuThSun and 1.25 mg on all other days)  The patient should have an INR checked in 3 days on Monday 7/21    Hermelinda Carranza, PharmD  PGY2 Pharmacy Resident  Thrombosis and Hemostasis Management

## 2025-07-18 NOTE — PROGRESS NOTES
D: Stopped by to see patient. No VAD related questions or concerns at this time.   I: Discussed POC and provided support and listened to patient and caregiver's thoughts and concerns.  P: Continue to follow patient and address any questions or concerns patient and or caregiver may have.      Indication of Interrogation:  Other: admitted with DLES infection    Type of VAD:  Heartmate 3    Current Parameters:  Flow= 3.2 lpm, Speed= 5000 rpm, Power= 3.2 jackson, PI = 5.4    Abnormal Alarm on History:  No    Abnormal Events/Parameters Notes:  No    Changes Made during Interrogation:  No

## 2025-07-18 NOTE — PLAN OF CARE
NURSING PROGRESS NOTE  Shift Summary      Date: July 18, 2025     Neuro/Musculoskeletal:  A&Ox4.   Cardiac:  100% A-paced.  VSS.  HM 3 LVAD, No Alarms  Respiratory:  Sating in the 90s on RA.  GI/:  Adequate urine output.  LBM: Yesterday  Diet/Appetite:  Tolerating Mechanical soft (Dental) diet.  Activity: Independent   Pain:  Denies.   Skin:  No new deficits noted. Driveline site clean with no discharge  LDAs + Drips/IVF:  Double Lumen PICC R SL  Protocols/Labs: Per orders     Pertinent Shift Updates:  Uneventful night with no acute changes        Plan:  Discharge to home today. Continue with POC and report changes to team      Derrick Taveras RN  .................................................... July 18, 2025   5:25 AM  [facility] ([facility]): [facility] ICU (Unit 6D)    Problem: Adult Inpatient Plan of Care  Goal: Readiness for Transition of Care  Outcome: Progressing     Problem: Ventricular Assist Device  Goal: Optimal Adjustment to Device  Outcome: Progressing  Intervention: Optimize Psychosocial Response to VAD  Recent Flowsheet Documentation  Taken 7/18/2025 0000 by Derrick Taveras RN  Family/Support System Care: caregiver stress acknowledged  Taken 7/17/2025 2026 by Derrick Taveras RN  Family/Support System Care: caregiver stress acknowledged   Goal Outcome Evaluation:      Plan of Care Reviewed With: patient    Overall Patient Progress: improvingOverall Patient Progress: improving

## 2025-07-19 ENCOUNTER — INFUSION THERAPY VISIT (OUTPATIENT)
Dept: INFUSION THERAPY | Facility: CLINIC | Age: 75
End: 2025-07-19
Attending: PHYSICIAN ASSISTANT
Payer: MEDICARE

## 2025-07-19 ENCOUNTER — HEALTH MAINTENANCE LETTER (OUTPATIENT)
Age: 75
End: 2025-07-19

## 2025-07-19 VITALS
SYSTOLIC BLOOD PRESSURE: 82 MMHG | HEART RATE: 60 BPM | OXYGEN SATURATION: 100 % | RESPIRATION RATE: 18 BRPM | TEMPERATURE: 97.5 F

## 2025-07-19 DIAGNOSIS — B94.8 SEQUELA OF CORYNEBACTERIUM INFECTION: Primary | ICD-10-CM

## 2025-07-19 PROCEDURE — 96365 THER/PROPH/DIAG IV INF INIT: CPT

## 2025-07-19 PROCEDURE — 250N000011 HC RX IP 250 OP 636: Performed by: PHYSICIAN ASSISTANT

## 2025-07-19 PROCEDURE — 96366 THER/PROPH/DIAG IV INF ADDON: CPT

## 2025-07-19 PROCEDURE — 258N000003 HC RX IP 258 OP 636: Performed by: PHYSICIAN ASSISTANT

## 2025-07-19 RX ORDER — ALBUTEROL SULFATE 90 UG/1
1-2 INHALANT RESPIRATORY (INHALATION)
Status: CANCELLED
Start: 2025-07-20

## 2025-07-19 RX ORDER — HEPARIN SODIUM,PORCINE 10 UNIT/ML
5-20 VIAL (ML) INTRAVENOUS DAILY PRN
Status: DISCONTINUED | OUTPATIENT
Start: 2025-07-19 | End: 2025-07-19 | Stop reason: HOSPADM

## 2025-07-19 RX ORDER — HEPARIN SODIUM (PORCINE) LOCK FLUSH IV SOLN 100 UNIT/ML 100 UNIT/ML
5 SOLUTION INTRAVENOUS
Status: CANCELLED | OUTPATIENT
Start: 2025-07-20

## 2025-07-19 RX ORDER — HEPARIN SODIUM,PORCINE 10 UNIT/ML
5-20 VIAL (ML) INTRAVENOUS DAILY PRN
Status: CANCELLED | OUTPATIENT
Start: 2025-07-20

## 2025-07-19 RX ORDER — EPINEPHRINE 1 MG/ML
0.3 INJECTION, SOLUTION INTRAMUSCULAR; SUBCUTANEOUS EVERY 5 MIN PRN
Status: CANCELLED | OUTPATIENT
Start: 2025-07-20

## 2025-07-19 RX ORDER — DIPHENHYDRAMINE HYDROCHLORIDE 50 MG/ML
25 INJECTION, SOLUTION INTRAMUSCULAR; INTRAVENOUS
Status: CANCELLED
Start: 2025-07-20

## 2025-07-19 RX ORDER — ALBUTEROL SULFATE 0.83 MG/ML
2.5 SOLUTION RESPIRATORY (INHALATION)
Status: CANCELLED | OUTPATIENT
Start: 2025-07-20

## 2025-07-19 RX ORDER — DIPHENHYDRAMINE HYDROCHLORIDE 50 MG/ML
50 INJECTION, SOLUTION INTRAMUSCULAR; INTRAVENOUS
Status: CANCELLED
Start: 2025-07-20

## 2025-07-19 RX ORDER — MEPERIDINE HYDROCHLORIDE 25 MG/ML
25 INJECTION INTRAMUSCULAR; INTRAVENOUS; SUBCUTANEOUS
Status: CANCELLED | OUTPATIENT
Start: 2025-07-20

## 2025-07-19 RX ORDER — METHYLPREDNISOLONE SODIUM SUCCINATE 40 MG/ML
40 INJECTION INTRAMUSCULAR; INTRAVENOUS
Status: CANCELLED
Start: 2025-07-20

## 2025-07-19 RX ADMIN — Medication 5 ML: at 14:11

## 2025-07-19 RX ADMIN — VANCOMYCIN HYDROCHLORIDE 1500 MG: 1 INJECTION, POWDER, LYOPHILIZED, FOR SOLUTION INTRAVENOUS at 12:32

## 2025-07-19 NOTE — PATIENT INSTRUCTIONS
Dear Duane C Johnson    Thank you for choosing [facility] ([facility] for your infusion.  The following information is a summary of our appointment as well as important reminders.      If you have any questions on your upcoming Specialty Infusion appointments, please call scheduling at [phone].  It was a pleasure taking care of you today.    Sincerely,    [facility]  [facility]  [address] [place] [address]  [address]  Phone:  [phone]

## 2025-07-19 NOTE — PROGRESS NOTES
Infusion Nursing Note:  Duane C Johnson presents today for Vancomycin.    Patient seen by provider today: No   present during visit today: Not Applicable.    Note: Pt reports being fine since discharge yesterday. He will do vanco here on the weekends and at [facility] during the week.      Intravenous Access:  PICC - double lumen.     Treatment Conditions:  Not Applicable.    Post Infusion Assessment:  Patient tolerated infusion without incident.  Site patent and intact, free from redness, edema or discomfort.    Discharge Plan:   Patient and/or family verbalized understanding of discharge instructions and all questions answered.  Patient discharged in stable condition accompanied by: wife.    Administrations This Visit       heparin lock flush 10 unit/mL injection 5-20 mL       Admin Date  07/19/2025 Action  $Given Dose  5 mL Route  Intracatheter Documented By  Shital Krishna RN              sodium chloride (PF) 0.9% PF flush 3-20 mL       Admin Date  07/19/2025 Action  $Given Dose  20 mL Route  Intracatheter Documented By  Shital Krishna RN              vancomycin (VANCOCIN) 1,500 mg in sodium chloride 0.9 % 250 mL intermittent infusion       Admin Date  07/19/2025 Action  $New Bag Dose  1,500 mg Route  Intravenous Documented By  Shital Krishna RN                  BP (!) 82/0 (Patient Position: Sitting)   Pulse 60   Temp 97.5  F (36.4  C) (Oral)   Resp 18   SpO2 100%     Shital Krishna RN

## 2025-07-20 ENCOUNTER — INFUSION THERAPY VISIT (OUTPATIENT)
Dept: INFUSION THERAPY | Facility: CLINIC | Age: 75
End: 2025-07-20
Attending: PHYSICIAN ASSISTANT
Payer: MEDICARE

## 2025-07-20 VITALS — TEMPERATURE: 97.5 F | HEART RATE: 60 BPM | OXYGEN SATURATION: 99 %

## 2025-07-20 DIAGNOSIS — B94.8 SEQUELA OF CORYNEBACTERIUM INFECTION: Primary | ICD-10-CM

## 2025-07-20 PROCEDURE — 96365 THER/PROPH/DIAG IV INF INIT: CPT

## 2025-07-20 PROCEDURE — 258N000003 HC RX IP 258 OP 636: Performed by: PHYSICIAN ASSISTANT

## 2025-07-20 PROCEDURE — 96366 THER/PROPH/DIAG IV INF ADDON: CPT

## 2025-07-20 PROCEDURE — 250N000011 HC RX IP 250 OP 636: Performed by: PHYSICIAN ASSISTANT

## 2025-07-20 RX ORDER — DIPHENHYDRAMINE HYDROCHLORIDE 50 MG/ML
50 INJECTION, SOLUTION INTRAMUSCULAR; INTRAVENOUS
Status: CANCELLED
Start: 2025-07-21

## 2025-07-20 RX ORDER — HEPARIN SODIUM,PORCINE 10 UNIT/ML
5-20 VIAL (ML) INTRAVENOUS DAILY PRN
Status: CANCELLED | OUTPATIENT
Start: 2025-07-21

## 2025-07-20 RX ORDER — EPINEPHRINE 1 MG/ML
0.3 INJECTION, SOLUTION INTRAMUSCULAR; SUBCUTANEOUS EVERY 5 MIN PRN
Status: CANCELLED | OUTPATIENT
Start: 2025-07-21

## 2025-07-20 RX ORDER — METHYLPREDNISOLONE SODIUM SUCCINATE 40 MG/ML
40 INJECTION INTRAMUSCULAR; INTRAVENOUS
Status: CANCELLED
Start: 2025-07-21

## 2025-07-20 RX ORDER — DIPHENHYDRAMINE HYDROCHLORIDE 50 MG/ML
25 INJECTION, SOLUTION INTRAMUSCULAR; INTRAVENOUS
Status: CANCELLED
Start: 2025-07-21

## 2025-07-20 RX ORDER — HEPARIN SODIUM (PORCINE) LOCK FLUSH IV SOLN 100 UNIT/ML 100 UNIT/ML
5 SOLUTION INTRAVENOUS
Status: CANCELLED | OUTPATIENT
Start: 2025-07-21

## 2025-07-20 RX ORDER — ALBUTEROL SULFATE 90 UG/1
1-2 INHALANT RESPIRATORY (INHALATION)
Status: CANCELLED
Start: 2025-07-21

## 2025-07-20 RX ORDER — MEPERIDINE HYDROCHLORIDE 25 MG/ML
25 INJECTION INTRAMUSCULAR; INTRAVENOUS; SUBCUTANEOUS
Status: CANCELLED | OUTPATIENT
Start: 2025-07-21

## 2025-07-20 RX ORDER — ALBUTEROL SULFATE 0.83 MG/ML
2.5 SOLUTION RESPIRATORY (INHALATION)
Status: CANCELLED | OUTPATIENT
Start: 2025-07-21

## 2025-07-20 RX ADMIN — VANCOMYCIN HYDROCHLORIDE 1500 MG: 1 INJECTION, POWDER, LYOPHILIZED, FOR SOLUTION INTRAVENOUS at 13:28

## 2025-07-20 NOTE — PROGRESS NOTES
Nursing Note  Duane C Johnson [facility] today [facility] for:   Chief Complaint   Patient presents with    Infusion     During today's [facility] appointment, orders from Torrie Forrest PA-C were completed.  Frequency: daily    Progress note:  Patient identification verified by name and date of birth.  Assessment completed.  Vitals recorded in Doc Flowsheets.  Patient was provided with education regarding medication/procedure and possible side effects.  Patient verbalized understanding.     present during visit today: Not Applicable.    Treatment Conditions: Non-applicable.    Premedications: were not ordered.    Drug Waste Record: No    Infusion length and rate:  infusion given over approximately 90 minutes    Labs: were not ordered for this appointment.    Vascular access: PICC accessed today.    Is the next appt scheduled? yes    Post Infusion Assessment:  Patient tolerated infusion without incident.     Discharge Plan:   Follow up plan of care with: ongoing infusions at [facility]., ordering provider as scheduled., and after visit summary given to patient  Discharge instructions were reviewed with patient.  Patient/representative verbalized understanding of discharge instructions and all questions answered.  Patient discharged from [facility] in stable condition.    Rafaela John RN      Administrations This Visit       vancomycin (VANCOCIN) 1,500 mg in sodium chloride 0.9 % 250 mL intermittent infusion       Admin Date  07/20/2025 Action  $New Bag Dose  1,500 mg Route  Intravenous Documented By  Rafaela John RN

## 2025-07-20 NOTE — PATIENT INSTRUCTIONS
Dear Duane C Johnson    Thank you for choosing [facility] ([facility]) for your infusion.  The following information is a summary of our appointment as well as important reminders.      If you are a transplant patient and require transplant education, please click on this link: [email or web address].    If you have any questions on your upcoming Specialty Infusion appointments, please call scheduling at [phone].  It was a pleasure taking care of you today.    Sincerely,    [facility]  [facility]  [address] [place] [address]  [address]  Phone:  [phone]

## 2025-07-21 ENCOUNTER — INFUSION THERAPY VISIT (OUTPATIENT)
Dept: INFUSION THERAPY | Facility: CLINIC | Age: 75
End: 2025-07-21
Attending: PHYSICIAN ASSISTANT
Payer: MEDICARE

## 2025-07-21 ENCOUNTER — PATIENT OUTREACH (OUTPATIENT)
Dept: CARE COORDINATION | Facility: CLINIC | Age: 75
End: 2025-07-21

## 2025-07-21 ENCOUNTER — INFUSION THERAPY VISIT (OUTPATIENT)
Dept: INFUSION THERAPY | Facility: CLINIC | Age: 75
End: 2025-07-21

## 2025-07-21 ENCOUNTER — ANTICOAGULATION THERAPY VISIT (OUTPATIENT)
Dept: ANTICOAGULATION | Facility: CLINIC | Age: 75
End: 2025-07-21

## 2025-07-21 VITALS
HEART RATE: 70 BPM | SYSTOLIC BLOOD PRESSURE: 94 MMHG | WEIGHT: 165 LBS | DIASTOLIC BLOOD PRESSURE: 71 MMHG | OXYGEN SATURATION: 99 % | BODY MASS INDEX: 25.84 KG/M2 | TEMPERATURE: 98.1 F

## 2025-07-21 DIAGNOSIS — Z95.811 LVAD (LEFT VENTRICULAR ASSIST DEVICE) PRESENT (H): ICD-10-CM

## 2025-07-21 DIAGNOSIS — Z79.01 ANTICOAGULATED ON WARFARIN: ICD-10-CM

## 2025-07-21 DIAGNOSIS — B94.8 SEQUELA OF CORYNEBACTERIUM INFECTION: Primary | ICD-10-CM

## 2025-07-21 DIAGNOSIS — I50.22 CHRONIC SYSTOLIC CONGESTIVE HEART FAILURE (H): Primary | ICD-10-CM

## 2025-07-21 DIAGNOSIS — I95.89 OTHER SPECIFIED HYPOTENSION: ICD-10-CM

## 2025-07-21 DIAGNOSIS — I50.42 CHRONIC COMBINED SYSTOLIC AND DIASTOLIC HEART FAILURE (H): ICD-10-CM

## 2025-07-21 LAB
ALBUMIN SERPL BCG-MCNC: 3.6 G/DL (ref 3.5–5.2)
ALP SERPL-CCNC: 119 U/L (ref 40–150)
ALT SERPL W P-5'-P-CCNC: 41 U/L (ref 0–70)
ANION GAP SERPL CALCULATED.3IONS-SCNC: 7 MMOL/L (ref 7–15)
AST SERPL W P-5'-P-CCNC: 35 U/L (ref 0–45)
BACTERIA WND CULT: NORMAL
BILIRUB SERPL-MCNC: 0.5 MG/DL
BUN SERPL-MCNC: 12 MG/DL (ref 8–23)
CALCIUM SERPL-MCNC: 8.6 MG/DL (ref 8.8–10.4)
CHLORIDE SERPL-SCNC: 101 MMOL/L (ref 98–107)
CREAT SERPL-MCNC: 1.07 MG/DL (ref 0.67–1.17)
EGFRCR SERPLBLD CKD-EPI 2021: 72 ML/MIN/1.73M2
ERYTHROCYTE [DISTWIDTH] IN BLOOD BY AUTOMATED COUNT: 14.8 % (ref 10–15)
GLUCOSE SERPL-MCNC: 114 MG/DL (ref 70–99)
HCO3 SERPL-SCNC: 26 MMOL/L (ref 22–29)
HCT VFR BLD AUTO: 26.2 % (ref 40–53)
HGB BLD-MCNC: 8.7 G/DL (ref 13.3–17.7)
INR PPP: 2.14 (ref 0.85–1.15)
LDH SERPL L TO P-CCNC: 201 U/L (ref 0–250)
MCH RBC QN AUTO: 30.2 PG (ref 26.5–33)
MCHC RBC AUTO-ENTMCNC: 33.2 G/DL (ref 31.5–36.5)
MCV RBC AUTO: 91 FL (ref 78–100)
PLATELET # BLD AUTO: 165 10E3/UL (ref 150–450)
POTASSIUM SERPL-SCNC: 3.7 MMOL/L (ref 3.4–5.3)
PROT SERPL-MCNC: 6.7 G/DL (ref 6.4–8.3)
PROTHROMBIN TIME: 23.6 SECONDS (ref 11.8–14.8)
RBC # BLD AUTO: 2.88 10E6/UL (ref 4.4–5.9)
SODIUM SERPL-SCNC: 134 MMOL/L (ref 135–145)
VANCOMYCIN SERPL-MCNC: 15.6 UG/ML (ref ?–25)
WBC # BLD AUTO: 5.4 10E3/UL (ref 4–11)

## 2025-07-21 PROCEDURE — 36592 COLLECT BLOOD FROM PICC: CPT

## 2025-07-21 PROCEDURE — 85610 PROTHROMBIN TIME: CPT | Performed by: PHYSICIAN ASSISTANT

## 2025-07-21 PROCEDURE — 82310 ASSAY OF CALCIUM: CPT | Performed by: PHYSICIAN ASSISTANT

## 2025-07-21 PROCEDURE — 258N000003 HC RX IP 258 OP 636: Performed by: PHYSICIAN ASSISTANT

## 2025-07-21 PROCEDURE — 83615 LACTATE (LD) (LDH) ENZYME: CPT | Performed by: PHYSICIAN ASSISTANT

## 2025-07-21 PROCEDURE — 96366 THER/PROPH/DIAG IV INF ADDON: CPT

## 2025-07-21 PROCEDURE — 96365 THER/PROPH/DIAG IV INF INIT: CPT

## 2025-07-21 PROCEDURE — 80202 ASSAY OF VANCOMYCIN: CPT | Performed by: PHYSICIAN ASSISTANT

## 2025-07-21 PROCEDURE — 250N000011 HC RX IP 250 OP 636: Performed by: PHYSICIAN ASSISTANT

## 2025-07-21 PROCEDURE — 85027 COMPLETE CBC AUTOMATED: CPT | Performed by: PHYSICIAN ASSISTANT

## 2025-07-21 RX ORDER — ALBUTEROL SULFATE 0.83 MG/ML
2.5 SOLUTION RESPIRATORY (INHALATION)
Status: CANCELLED | OUTPATIENT
Start: 2025-07-22

## 2025-07-21 RX ORDER — HEPARIN SODIUM,PORCINE 10 UNIT/ML
5-20 VIAL (ML) INTRAVENOUS DAILY PRN
Status: CANCELLED | OUTPATIENT
Start: 2025-07-22

## 2025-07-21 RX ORDER — HEPARIN SODIUM (PORCINE) LOCK FLUSH IV SOLN 100 UNIT/ML 100 UNIT/ML
5 SOLUTION INTRAVENOUS
Status: CANCELLED | OUTPATIENT
Start: 2025-07-22

## 2025-07-21 RX ORDER — EPINEPHRINE 1 MG/ML
0.3 INJECTION, SOLUTION, CONCENTRATE INTRAVENOUS EVERY 5 MIN PRN
Status: CANCELLED | OUTPATIENT
Start: 2025-07-22

## 2025-07-21 RX ORDER — DIPHENHYDRAMINE HYDROCHLORIDE 50 MG/ML
50 INJECTION, SOLUTION INTRAMUSCULAR; INTRAVENOUS
Status: CANCELLED
Start: 2025-07-22

## 2025-07-21 RX ORDER — ALBUTEROL SULFATE 90 UG/1
1-2 INHALANT RESPIRATORY (INHALATION)
Status: CANCELLED
Start: 2025-07-22

## 2025-07-21 RX ORDER — HEPARIN SODIUM,PORCINE 10 UNIT/ML
5-20 VIAL (ML) INTRAVENOUS DAILY PRN
Status: DISCONTINUED | OUTPATIENT
Start: 2025-07-21 | End: 2025-07-21 | Stop reason: HOSPADM

## 2025-07-21 RX ORDER — DIPHENHYDRAMINE HYDROCHLORIDE 50 MG/ML
25 INJECTION, SOLUTION INTRAMUSCULAR; INTRAVENOUS
Status: CANCELLED
Start: 2025-07-22

## 2025-07-21 RX ORDER — MEPERIDINE HYDROCHLORIDE 25 MG/ML
25 INJECTION INTRAMUSCULAR; INTRAVENOUS; SUBCUTANEOUS
Status: CANCELLED | OUTPATIENT
Start: 2025-07-22

## 2025-07-21 RX ORDER — METHYLPREDNISOLONE SODIUM SUCCINATE 40 MG/ML
40 INJECTION INTRAMUSCULAR; INTRAVENOUS
Status: CANCELLED
Start: 2025-07-22

## 2025-07-21 RX ADMIN — HEPARIN, PORCINE (PF) 10 UNIT/ML INTRAVENOUS SYRINGE 5 ML: at 17:12

## 2025-07-21 RX ADMIN — SODIUM CHLORIDE 100 ML: 9 INJECTION, SOLUTION INTRAVENOUS at 15:27

## 2025-07-21 RX ADMIN — VANCOMYCIN HYDROCHLORIDE 1500 MG: 1 INJECTION, POWDER, LYOPHILIZED, FOR SOLUTION INTRAVENOUS at 15:29

## 2025-07-21 ASSESSMENT — ACTIVITIES OF DAILY LIVING (ADL): DEPENDENT_IADLS:: INDEPENDENT

## 2025-07-21 NOTE — LETTER
[facility] CARE COORDINATION  [address] [facility] [address]     July 22, 2025    Duane C Johnson  [address] [facility] [address]      Dear Duane,    I am a clinic care coordinator who works with Jose Coles MD with the [facility]. I have been trying to reach you recently to introduce Clinic Care Coordination. Below is a description of clinic care coordination and how I can further assist you.       The clinic care coordination team is made up of a registered nurse, , financial resource worker and community health worker who understand the health care system. The goal of clinic care coordination is to help you manage your health and improve access to the health care system. Our team works alongside your provider to assist you in determining your health and social needs. We can help you obtain health care and community resources, providing you with necessary information and education. We can work with you through any barriers and develop a care plan that helps coordinate and strengthen the communication between you and your care team.  Our services are voluntary and are offered without charge to you personally.    Please feel free to contact me with any questions or concerns regarding care coordination and what we can offer.      We are focused on providing you with the highest-quality healthcare experience possible.    Sincerely,     [facility]   Ruthy Paiz RN, Care Coordinator   [facility]   E-mail [email or web address]   [phone]

## 2025-07-21 NOTE — PROGRESS NOTES
Clinic Care Coordination Contact  [facility]   Cardiology II Service / Advanced Heart Failure  Discharge Summary      Duane C Johnson MRN# [id number]   YOB: 1950 Age: 75 year old      DATE OF ADMISSION:               7/14/2025  DATE OF DISCHARGE:             7/18/2025  ADMITTING PROVIDER:           Cristobal Laurent MD  DISCHARGE PROVIDER:         Torrie Forrest Pa-C and Alonso Valentine MD   PRIMARY PROVIDER:              Jose Coles     ADMIT DIAGNOSES:   LVAD driveline infection  Chronic systolic Heart Failure Secondary to ICM s/p HM3 LVAD as DT on 9/18,24, Stage D Class III  History of low flow alarms with a LOW FLOW CONTROLLER   History of VT/VF arrest in 2023 2/2 STEMI  AFib s/p DCCV 9/2024 and again 9/30/2024   CAD w/h/o anterior STEMI s/p pLAD stent in 2023   Adentulous   Radiation proctitis with history of rectal bleeding  Cognitive changes  Inguinal hernia, non-incarcerated, non-strangulated      DISCHARGE DIAGNOSES:   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  Has antibiotic  infusion appointment today at 2:30       Plan:.   PCP follow up visit tomorrow 7/22 5/30 pm   Care Coordinator will try to reach patient again in 1-2 business days.    [facility]   Ruthy Paiz RN, Care Coordinator   [facility]   E-mail  [email or web address]   [phone]

## 2025-07-21 NOTE — PROGRESS NOTES
ANTICOAGULATION MANAGEMENT     Duane C Johnson 75 year old male is on warfarin with therapeutic INR result. (Goal INR 2.0-2.5)    Recent labs: (last 7 days)     07/21/25  1455   INR 2.14*       ASSESSMENT     Source(s): Chart Review and Patient/Caregiver Call     Warfarin doses taken: Warfarin taken as instructed  Diet: No new diet changes identified  Medication/supplement changes: On Vanco daily until 8/4/25- Concurrent use of VANCOMYCIN and WARFARIN may result in an increased risk of bleeding.  New illness, injury, or hospitalization: Yes: Hospitalized 7/14-7/18 for DLES infection  Signs or symptoms of bleeding or clotting: No  Previous result: Therapeutic last 2(+) visits  Additional findings: None       PLAN     Recommended plan for ongoing change(s) affecting INR     Dosing Instructions: Continue your current warfarin dose with next INR in 4 days       Summary  As of 7/21/2025      Full warfarin instructions:  2.5 mg every Sun, Tue, Thu; 1.25 mg all other days   Next INR check:  7/25/2025               Telephone call with Melissa, wife who verbalizes understanding and agrees to plan and who agrees to plan and repeated back plan correctly    Check at provider office visit    Education provided: Please call back if any changes to your diet, medications or how you've been taking warfarin    Plan made per [facility] anticoagulation protocol and per LVAD protocol    Carlyn Prince RN  7/21/2025  Anticoagulation Clinic  [employer] for routing messages: susannah ANTICOAG LVAD  [facility] patient phone line: [phone]        _______________________________________________________________________     Anticoagulation Episode Summary       Current INR goal:  2.0-2.5   TTR:  71.4% (7.9 mo)   Target end date:  Indefinite   Send INR reminders to:  MONIK LVAD    Indications    Chronic systolic congestive heart failure (H) [I50.22]  Anticoagulated on warfarin [Z79.01]  LVAD (left ventricular assist device) present (H) [Z95.811]  Other  specified hypotension [I95.89]  Chronic combined systolic and diastolic heart failure (H) [I50.42]             Comments:  Follow VAD Anticoag protocol:Yes: HeartMate 3  Bridging: Enoxaparin  Date VAD placed: 9/18/24             Anticoagulation Care Providers       Provider Role Specialty Phone number    Ham Cruz MD Referring Advanced Heart Failure and Transplant Cardiology [phone]    Sravanthi Asencio MD Referring Cardiovascular Disease [phone]    Torrie Forrest PARUPAL Referring Cardiovascular Disease [phone]

## 2025-07-21 NOTE — PROGRESS NOTES
Infusion Nursing Note:  Duane C Johnson presents today for Vancomycin.    Patient seen by provider today: No   present during visit today: Not Applicable.    Note: Discharged from hospital on Friday, Dx LVAD driveline infection, chronic heart failure.  Arrives here via W/C, wife present. Confused at times but also answers some questions appropriately and can carry a conversation.       Intravenous Access:  PICC.    Vanco level also drawn today.    Lab Results   Component Value Date    HGB 8.7 (L) 07/21/2025    WBC 5.4 07/21/2025    ANEU 3.4 07/14/2025     07/21/2025        Lab Results   Component Value Date     (L) 07/21/2025    POTASSIUM 3.7 07/21/2025    MAG 2.1 03/28/2025    CR 1.07 07/21/2025    PRANAV 8.6 (L) 07/21/2025    BILITOTAL 0.5 07/21/2025    ALBUMIN 3.6 07/21/2025    ALT 41 07/21/2025    AST 35 07/21/2025         Post Infusion Assessment:  Patient tolerated infusion without incident.  Blood return noted pre and post infusion.  Site patent and intact, free from redness, edema or discomfort.  No evidence of extravasations.  Access discontinued per protocol. Hep locked.       Discharge Plan:   AVS to patient via MYCHART.  Patient will return 7/22 for next appointment.   Patient discharged in stable condition accompanied by: wife.  Departure Mode: Wheelchair.      Toyin Cheney, RN

## 2025-07-22 ENCOUNTER — INFUSION THERAPY VISIT (OUTPATIENT)
Dept: INFUSION THERAPY | Facility: CLINIC | Age: 75
End: 2025-07-22
Attending: PHYSICIAN ASSISTANT
Payer: MEDICARE

## 2025-07-22 ENCOUNTER — VIRTUAL VISIT (OUTPATIENT)
Dept: FAMILY MEDICINE | Facility: CLINIC | Age: 75
End: 2025-07-22
Attending: PHYSICIAN ASSISTANT
Payer: MEDICARE

## 2025-07-22 VITALS — OXYGEN SATURATION: 100 % | TEMPERATURE: 97.7 F | SYSTOLIC BLOOD PRESSURE: 87 MMHG | DIASTOLIC BLOOD PRESSURE: 57 MMHG

## 2025-07-22 DIAGNOSIS — K40.90 LEFT INGUINAL HERNIA: ICD-10-CM

## 2025-07-22 DIAGNOSIS — I50.22 CHRONIC SYSTOLIC CONGESTIVE HEART FAILURE (H): Primary | ICD-10-CM

## 2025-07-22 DIAGNOSIS — T82.7XXA INFECTION ASSOCIATED WITH DRIVELINE OF LEFT VENTRICULAR ASSIST DEVICE (LVAD): ICD-10-CM

## 2025-07-22 DIAGNOSIS — I48.91 ATRIAL FIBRILLATION, UNSPECIFIED TYPE (H): ICD-10-CM

## 2025-07-22 DIAGNOSIS — E61.1 IRON DEFICIENCY: ICD-10-CM

## 2025-07-22 DIAGNOSIS — K62.5 RECTAL BLEEDING: ICD-10-CM

## 2025-07-22 DIAGNOSIS — B94.8 SEQUELA OF CORYNEBACTERIUM INFECTION: Primary | ICD-10-CM

## 2025-07-22 PROCEDURE — 250N000011 HC RX IP 250 OP 636: Performed by: PHYSICIAN ASSISTANT

## 2025-07-22 PROCEDURE — 96366 THER/PROPH/DIAG IV INF ADDON: CPT

## 2025-07-22 PROCEDURE — 258N000003 HC RX IP 258 OP 636: Performed by: PHYSICIAN ASSISTANT

## 2025-07-22 PROCEDURE — 96365 THER/PROPH/DIAG IV INF INIT: CPT

## 2025-07-22 PROCEDURE — 1111F DSCHRG MED/CURRENT MED MERGE: CPT | Performed by: NURSE PRACTITIONER

## 2025-07-22 PROCEDURE — 99495 TRANSJ CARE MGMT MOD F2F 14D: CPT | Mod: 95 | Performed by: NURSE PRACTITIONER

## 2025-07-22 RX ORDER — DIPHENHYDRAMINE HYDROCHLORIDE 50 MG/ML
50 INJECTION, SOLUTION INTRAMUSCULAR; INTRAVENOUS
Status: CANCELLED
Start: 2025-07-23

## 2025-07-22 RX ORDER — HEPARIN SODIUM (PORCINE) LOCK FLUSH IV SOLN 100 UNIT/ML 100 UNIT/ML
5 SOLUTION INTRAVENOUS
Status: CANCELLED | OUTPATIENT
Start: 2025-07-23

## 2025-07-22 RX ORDER — DIPHENHYDRAMINE HYDROCHLORIDE 50 MG/ML
25 INJECTION, SOLUTION INTRAMUSCULAR; INTRAVENOUS
Status: CANCELLED
Start: 2025-07-23

## 2025-07-22 RX ORDER — HEPARIN SODIUM (PORCINE) LOCK FLUSH IV SOLN 100 UNIT/ML 100 UNIT/ML
5 SOLUTION INTRAVENOUS
Status: DISCONTINUED | OUTPATIENT
Start: 2025-07-22 | End: 2025-07-22 | Stop reason: HOSPADM

## 2025-07-22 RX ORDER — HEPARIN SODIUM,PORCINE 10 UNIT/ML
5-20 VIAL (ML) INTRAVENOUS DAILY PRN
Status: DISCONTINUED | OUTPATIENT
Start: 2025-07-22 | End: 2025-07-22 | Stop reason: HOSPADM

## 2025-07-22 RX ORDER — METHYLPREDNISOLONE SODIUM SUCCINATE 40 MG/ML
40 INJECTION INTRAMUSCULAR; INTRAVENOUS
Status: CANCELLED
Start: 2025-07-23

## 2025-07-22 RX ORDER — ALBUTEROL SULFATE 0.83 MG/ML
2.5 SOLUTION RESPIRATORY (INHALATION)
Status: CANCELLED | OUTPATIENT
Start: 2025-07-23

## 2025-07-22 RX ORDER — HEPARIN SODIUM,PORCINE 10 UNIT/ML
5-20 VIAL (ML) INTRAVENOUS DAILY PRN
Status: CANCELLED | OUTPATIENT
Start: 2025-07-23

## 2025-07-22 RX ORDER — EPINEPHRINE 1 MG/ML
0.3 INJECTION, SOLUTION, CONCENTRATE INTRAVENOUS EVERY 5 MIN PRN
Status: CANCELLED | OUTPATIENT
Start: 2025-07-23

## 2025-07-22 RX ORDER — ALBUTEROL SULFATE 90 UG/1
1-2 INHALANT RESPIRATORY (INHALATION)
Status: CANCELLED
Start: 2025-07-23

## 2025-07-22 RX ORDER — MEPERIDINE HYDROCHLORIDE 25 MG/ML
25 INJECTION INTRAMUSCULAR; INTRAVENOUS; SUBCUTANEOUS
Status: CANCELLED | OUTPATIENT
Start: 2025-07-23

## 2025-07-22 RX ADMIN — HEPARIN, PORCINE (PF) 10 UNIT/ML INTRAVENOUS SYRINGE 5 ML: at 16:48

## 2025-07-22 RX ADMIN — VANCOMYCIN HYDROCHLORIDE 1500 MG: 1 INJECTION, POWDER, LYOPHILIZED, FOR SOLUTION INTRAVENOUS at 14:49

## 2025-07-22 RX ADMIN — SODIUM CHLORIDE 100 ML: 9 INJECTION, SOLUTION INTRAVENOUS at 14:45

## 2025-07-22 ASSESSMENT — ACTIVITIES OF DAILY LIVING (ADL): DEPENDENT_IADLS:: INDEPENDENT

## 2025-07-22 ASSESSMENT — PAIN SCALES - GENERAL: PAINLEVEL_OUTOF10: NO PAIN (0)

## 2025-07-22 NOTE — PATIENT INSTRUCTIONS
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  Recently hospitalized Stage D. NYHA Class III.  Currently followed by cardiology.  Currently doing daily antibiotic infusions, tolerating well.  Denies any fevers.  Keeping driveline dressing clean, dry and intact.  Changing daily and discharge is minimal.  Has follow-up with cardiology in September.  Has upcoming appointment with infectious disease.  Continue all current medications without any changes and follow-up with specialists and continue infusions as scheduled.    Rectal bleeding  Iron deficiency  Chronic history of rectal bleeding and anemia with iron deficiency.  Follows colorectal outpatient, has appointment coming up in August.  Likely secondary to radiation proctitis.  Hemoglobin yesterday stable at 8.7.  Patient feeling fatigued, same as prior to hospitalization.  Iron infusion will be set up outpatient towards the end of antibiotic infusions per discharge.    Left inguinal hernia  Chronic inguinal hernia.  Reducible and no significant pain or changes in bowel habits.  Had general surgery appointment, however missed due to being hospitalized.  Will reschedule once things settle down a little bit, likely mid September per wife.

## 2025-07-22 NOTE — PROGRESS NOTES
"Duane is a 75 year old who is being evaluated via a billable video visit.    How would you like to obtain your AVS? MyChart  If the video visit is dropped, the invitation should be resent by: Text to cell phone: [phone]  Will anyone else be joining your video visit? 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  Recently hospitalized Stage D. NYHA Class III.  Currently followed by cardiology.  Currently doing daily antibiotic infusions, tolerating well.  Denies any fevers.  Keeping driveline dressing clean, dry and intact.  Changing daily and discharge is minimal.  Has follow-up with cardiology in September.  Has upcoming appointment with infectious disease.  Continue all current medications without any changes and follow-up with specialists and continue infusions as scheduled.    Rectal bleeding  Iron deficiency  Chronic history of rectal bleeding and anemia with iron deficiency.  Follows colorectal outpatient, has appointment coming up in August.  Likely secondary to radiation proctitis.  Hemoglobin yesterday stable at 8.7.  Patient feeling fatigued, same as prior to hospitalization.  Iron infusion will be set up outpatient towards the end of antibiotic infusions per discharge.    Left inguinal hernia  Chronic inguinal hernia.  Reducible and no significant pain or changes in bowel habits.  Had general surgery appointment, however missed due to being hospitalized.  Will reschedule once things settle down a little bit, likely mid September per wife.    MED REC REQUIRED  Post Medication Reconciliation Status: discharge medications reconciled, continue medications without change  BMI  Estimated body mass index is 25.84 kg/m  as calculated from the following:    Height as of 7/14/25: 1.702 m (5' 7\").    Weight as of 7/21/25: 74.8 kg (165 lb). "           Subjective Duane is a 75 year old, presenting for the following health issues:  Hospital F/U        7/22/2025     2:56 PM   Additional Questions   Roomed by Pam JOYCE([facility])       Video Start Time: 5:09 PM    HPI      Fatigue is present today       Hospital Follow-up Visit:    Hospital/Nursing Home/IP Rehab Facility: [facility]  Most Recent Admission Date: 7/14/2025   Most Recent Admission Diagnosis: Lightheadedness - R42  Weakness - R53.1  Encounter for fitting of dentures - Z46.3  LVAD (left ventricular assist device) present (H) - Z95.811     Most Recent Discharge Date: 7/18/2025   Most Recent Discharge Diagnosis: Weakness - R53.1  Lightheadedness - R42  Encounter for fitting of dentures - Z46.3  Hyponatremia - E87.1  Anemia, unspecified type - D64.9  LVAD (left ventricular assist device) present (H) - Z95.811  Sequela of Corynebacterium infection - B94.8  Hematochezia - K92.1  Generalized weakness - R53.1  Left inguinal hernia - K40.90   Do you have any other stressors you would like to discuss with your provider? No    Problems taking medications regularly:  None  Medication changes since discharge: None  Problems adhering to non-medication therapy:  None    Summary of hospitalization:  [facility] discharge summary reviewed  Diagnostic Tests/Treatments reviewed.  Follow up needed: FOLLOW-UP:  [] ID labs: Creatinine, AST/ALT, CBC with diff, and Vancomycin level. Dr. Sarah will follow labs at discharge until ID follow up. Fax labs to ID clinic. On Tuesday, he will also get an INR and LDH followed by cards 2  []  LVAD ID appointment 8/4/25, scheduled  [] Colorectal surgery is arranging follow-up with their team for further treatment of his radiation proctitis   [] Will need IV iron - will plan to arrange for his last week or so of antibiotic therapy  Other Healthcare Providers Involved in Patient s Care:         Specialist appointment - ID,  Cardiology  - Colorectal team to arrange follow-up 8/18, Referred to oral surgery at [facility] at discharge       Update since discharge: stable.       Getting antibiotic infusion daily   Bandage changes daily ~ very little discharge   No fevers    Plan of care communicated with patient and spouse                       Review of Systems  Constitutional, neuro, ENT, endocrine, pulmonary, cardiac, gastrointestinal, genitourinary, musculoskeletal, integument and psychiatric systems are negative, except as otherwise noted.      Objective             Physical Exam   GENERAL: alert and no distress  EYES: Eyes grossly normal to inspection.  No discharge or erythema, or obvious scleral/conjunctival abnormalities.  RESP: No audible wheeze, cough, or visible cyanosis.    SKIN: Visible skin clear. No significant rash, abnormal pigmentation or lesions.  NEURO: Cranial nerves grossly intact.  Mentation and speech appropriate for age.  PSYCH: Appropriate affect, tone, and pace of words    Diagnostic Test Results:  Labs reviewed in Epic  none        Video-Visit Details    Type of service:  Video Visit   Video End Time:5:38 PM  Originating Location (pt. Location): Home    Distant Location (provider location):  Off-site  Platform used for Video Visit: Ynes  Signed Electronically by: Katiana Kirk DNP      Chart documentation with Dragon Voice recognition Software. Although reviewed after completion, some words and grammatical errors may remain.

## 2025-07-22 NOTE — PROGRESS NOTES
Infusion Nursing Note:  Duane C Johnson presents today for Vancomycin infusion.    Patient seen by provider today: No   present during visit today: Not Applicable.    Note: Patient is here today with his wife Melissa for his antibiotic infusion.  Has a PICC line  dressing  change is due tomorrow.      Intravenous Access:  PICC.    Treatment Conditions:  Not Applicable.      Post Infusion Assessment:  Patient tolerated infusion without incident.  Blood return noted pre and post infusion.       Discharge Plan:   Patient discharged in stable condition accompanied by: .  Departure Mode: Wheelchair.  Patient has appt to return tomorrow. .      Leda Reynolds RN

## 2025-07-22 NOTE — PROGRESS NOTES
Clinic Care Coordination Contact  [facility]    Clinical Data: Care Coordinator Outreach    Outreach Documentation Number of Outreach Attempt   7/21/2025   1:19 PM 1   7/22/2025   2:05 PM 2     No answer phone keeps ringing.  Patient has a video hospital follow up with PCP today at 5:30 pm       Plan: Care Coordinator will send unable to contact letter with care coordinator contact information via [employer]. Care Coordinator will do no further outreaches at this time.    [facility]   Ruthy Paiz RN, Care Coordinator   [facility]'s   E-mail [email or web address]   [phone]

## 2025-07-23 ENCOUNTER — INFUSION THERAPY VISIT (OUTPATIENT)
Dept: INFUSION THERAPY | Facility: CLINIC | Age: 75
End: 2025-07-23
Attending: PHYSICIAN ASSISTANT
Payer: MEDICARE

## 2025-07-23 ENCOUNTER — PATIENT OUTREACH (OUTPATIENT)
Dept: CARE COORDINATION | Facility: CLINIC | Age: 75
End: 2025-07-23
Payer: MEDICARE

## 2025-07-23 VITALS
SYSTOLIC BLOOD PRESSURE: 98 MMHG | TEMPERATURE: 98.5 F | OXYGEN SATURATION: 96 % | BODY MASS INDEX: 26.75 KG/M2 | HEART RATE: 62 BPM | RESPIRATION RATE: 18 BRPM | WEIGHT: 170.8 LBS | DIASTOLIC BLOOD PRESSURE: 68 MMHG

## 2025-07-23 DIAGNOSIS — B94.8 SEQUELA OF CORYNEBACTERIUM INFECTION: Primary | ICD-10-CM

## 2025-07-23 PROCEDURE — 96366 THER/PROPH/DIAG IV INF ADDON: CPT

## 2025-07-23 PROCEDURE — 258N000003 HC RX IP 258 OP 636: Performed by: PHYSICIAN ASSISTANT

## 2025-07-23 PROCEDURE — 250N000011 HC RX IP 250 OP 636: Performed by: PHYSICIAN ASSISTANT

## 2025-07-23 PROCEDURE — 96365 THER/PROPH/DIAG IV INF INIT: CPT

## 2025-07-23 RX ORDER — ALBUTEROL SULFATE 90 UG/1
1-2 INHALANT RESPIRATORY (INHALATION)
Status: CANCELLED
Start: 2025-07-24

## 2025-07-23 RX ORDER — HEPARIN SODIUM (PORCINE) LOCK FLUSH IV SOLN 100 UNIT/ML 100 UNIT/ML
5 SOLUTION INTRAVENOUS
Status: CANCELLED | OUTPATIENT
Start: 2025-07-24

## 2025-07-23 RX ORDER — DIPHENHYDRAMINE HYDROCHLORIDE 50 MG/ML
50 INJECTION, SOLUTION INTRAMUSCULAR; INTRAVENOUS
Status: CANCELLED
Start: 2025-07-24

## 2025-07-23 RX ORDER — HEPARIN SODIUM,PORCINE 10 UNIT/ML
5-20 VIAL (ML) INTRAVENOUS DAILY PRN
Status: CANCELLED | OUTPATIENT
Start: 2025-07-24

## 2025-07-23 RX ORDER — ALBUTEROL SULFATE 0.83 MG/ML
2.5 SOLUTION RESPIRATORY (INHALATION)
Status: CANCELLED | OUTPATIENT
Start: 2025-07-24

## 2025-07-23 RX ORDER — METHYLPREDNISOLONE SODIUM SUCCINATE 40 MG/ML
40 INJECTION INTRAMUSCULAR; INTRAVENOUS
Status: CANCELLED
Start: 2025-07-24

## 2025-07-23 RX ORDER — EPINEPHRINE 1 MG/ML
0.3 INJECTION, SOLUTION, CONCENTRATE INTRAVENOUS EVERY 5 MIN PRN
Status: CANCELLED | OUTPATIENT
Start: 2025-07-24

## 2025-07-23 RX ORDER — DIPHENHYDRAMINE HYDROCHLORIDE 50 MG/ML
25 INJECTION, SOLUTION INTRAMUSCULAR; INTRAVENOUS
Status: CANCELLED
Start: 2025-07-24

## 2025-07-23 RX ORDER — MEPERIDINE HYDROCHLORIDE 25 MG/ML
25 INJECTION INTRAMUSCULAR; INTRAVENOUS; SUBCUTANEOUS
Status: CANCELLED | OUTPATIENT
Start: 2025-07-24

## 2025-07-23 RX ADMIN — VANCOMYCIN HYDROCHLORIDE 1500 MG: 1 INJECTION, POWDER, LYOPHILIZED, FOR SOLUTION INTRAVENOUS at 14:24

## 2025-07-23 RX ADMIN — SODIUM CHLORIDE 250 ML: 0.9 INJECTION, SOLUTION INTRAVENOUS at 14:14

## 2025-07-23 ASSESSMENT — PAIN SCALES - GENERAL: PAINLEVEL_OUTOF10: NO PAIN (0)

## 2025-07-23 NOTE — PROGRESS NOTES
Infusion Nursing Note:  Duane C Johnson presents today for IV Vancomycin with PICC drsg change.    Patient seen by provider today: No   present during visit today: Not Applicable.    Note: N/A.      Intravenous Access:  PICC.    Treatment Conditions:  Not Applicable.      Post Infusion Assessment:  Patient tolerated infusion without incident.       Discharge Plan:   Patient discharged in stable condition accompanied by: wife.  Departure Mode: Wheelchair.      Dianna Guevara RN

## 2025-07-24 ENCOUNTER — INFUSION THERAPY VISIT (OUTPATIENT)
Dept: INFUSION THERAPY | Facility: CLINIC | Age: 75
End: 2025-07-24
Attending: PHYSICIAN ASSISTANT
Payer: MEDICARE

## 2025-07-24 VITALS
TEMPERATURE: 97.7 F | HEART RATE: 52 BPM | RESPIRATION RATE: 16 BRPM | OXYGEN SATURATION: 98 % | DIASTOLIC BLOOD PRESSURE: 62 MMHG | SYSTOLIC BLOOD PRESSURE: 87 MMHG

## 2025-07-24 DIAGNOSIS — B94.8 SEQUELA OF CORYNEBACTERIUM INFECTION: Primary | ICD-10-CM

## 2025-07-24 PROCEDURE — 250N000011 HC RX IP 250 OP 636: Performed by: PHYSICIAN ASSISTANT

## 2025-07-24 PROCEDURE — 258N000003 HC RX IP 258 OP 636: Performed by: PHYSICIAN ASSISTANT

## 2025-07-24 RX ORDER — ALBUTEROL SULFATE 90 UG/1
1-2 INHALANT RESPIRATORY (INHALATION)
Start: 2025-07-25

## 2025-07-24 RX ORDER — EPINEPHRINE 1 MG/ML
0.3 INJECTION, SOLUTION, CONCENTRATE INTRAVENOUS EVERY 5 MIN PRN
OUTPATIENT
Start: 2025-07-25

## 2025-07-24 RX ORDER — MEPERIDINE HYDROCHLORIDE 25 MG/ML
25 INJECTION INTRAMUSCULAR; INTRAVENOUS; SUBCUTANEOUS
OUTPATIENT
Start: 2025-07-25

## 2025-07-24 RX ORDER — DIPHENHYDRAMINE HYDROCHLORIDE 50 MG/ML
50 INJECTION, SOLUTION INTRAMUSCULAR; INTRAVENOUS
Start: 2025-07-25

## 2025-07-24 RX ORDER — METHYLPREDNISOLONE SODIUM SUCCINATE 40 MG/ML
40 INJECTION INTRAMUSCULAR; INTRAVENOUS
Start: 2025-07-25

## 2025-07-24 RX ORDER — ALBUTEROL SULFATE 0.83 MG/ML
2.5 SOLUTION RESPIRATORY (INHALATION)
OUTPATIENT
Start: 2025-07-25

## 2025-07-24 RX ORDER — DIPHENHYDRAMINE HYDROCHLORIDE 50 MG/ML
25 INJECTION, SOLUTION INTRAMUSCULAR; INTRAVENOUS
Start: 2025-07-25

## 2025-07-24 RX ORDER — HEPARIN SODIUM (PORCINE) LOCK FLUSH IV SOLN 100 UNIT/ML 100 UNIT/ML
5 SOLUTION INTRAVENOUS
OUTPATIENT
Start: 2025-07-25

## 2025-07-24 RX ORDER — HEPARIN SODIUM,PORCINE 10 UNIT/ML
5-20 VIAL (ML) INTRAVENOUS DAILY PRN
OUTPATIENT
Start: 2025-07-25

## 2025-07-24 RX ADMIN — SODIUM CHLORIDE 100 ML: 9 INJECTION, SOLUTION INTRAVENOUS at 14:33

## 2025-07-24 RX ADMIN — VANCOMYCIN HYDROCHLORIDE 1500 MG: 1 INJECTION, POWDER, LYOPHILIZED, FOR SOLUTION INTRAVENOUS at 14:37

## 2025-07-24 ASSESSMENT — PAIN SCALES - GENERAL: PAINLEVEL_OUTOF10: NO PAIN (0)

## 2025-07-24 NOTE — PROGRESS NOTES
Infusion Nursing Note:  Duane C Johnson presents today for IV Vancomycin.  Patient seen by provider today: No   present during visit today: Not Applicable.    Note: N/A.      Intravenous Access:  PICC.    Treatment Conditions:  Not Applicable.      Post Infusion Assessment:  Patient tolerated infusion without incident.       Discharge Plan:   Patient discharged in stable condition accompanied by: wife.  Departure Mode: Wheelchair.      Dianna Guevara RN

## 2025-07-24 NOTE — PHARMACY
Pharmacy Vancomycin Note  Date of Service 2025  Patient's  1950   75 year old, male    Indication: LVAD Driveline infection  Day of Therapy: 8  Current vancomycin regimen:  1500 mg IV q24h  Current vancomycin monitoring method: AUC  Current vancomycin therapeutic monitoring goal: 400-600 mg*h/L    InsightRX Prediction of Current Vancomycin Regimen  Regimen: 1500 mg IV every 24 hours.  Start time: 14:37 on 2025  Exposure target: AUC24 (range) 400-600 mg/L.hr   AUC24,ss: 541 mg/L.hr  Probability of AUC24 > 400: >95 %  Ctrough,ss: 16.2 mg/L  Probability of Ctrough,ss > 20: 7 %  Probability of nephrotoxicity (Lodise KARLA ): 12%    Current estimated CrCl = Estimated Creatinine Clearance: 65.4 mL/min (based on SCr of 1.07 mg/dL).    Creatinine for last 3 days  2025:  2:55 PM Creatinine 1.07 mg/dL    Recent Vancomycin Levels (past 3 days)  2025:  2:55 PM Vancomycin 15.6 ug/mL    Vancomycin IV Administrations (past 72 hours)                     vancomycin (VANCOCIN) 1,500 mg in 0.9% NaCl 250 mL intermittent infusion (mg) 1,500 mg New Bag 25 1437    vancomycin (VANCOCIN) 1,500 mg in 0.9% NaCl 250 mL intermittent infusion (mg) 1,500 mg New Bag 25 1424    vancomycin (VANCOCIN) 1,500 mg in 0.9% NaCl 250 mL intermittent infusion (mg) 1,500 mg New Bag 25 1449    vancomycin (VANCOCIN) 1,500 mg in 0.9% NaCl 250 mL intermittent infusion (mg) 1,500 mg New Bag 25 1529                    Nephrotoxins and other renal medications (From now, onward)      Start     Dose/Rate Route Frequency Ordered Stop    25 1445  vancomycin (VANCOCIN) 1,500 mg in 0.9% NaCl 250 mL intermittent infusion        Note to Pharmacy: Phamacist o adjust- AUC target is 400-600    1,500 mg  over 90 Minutes Intravenous ONCE 25 1428                 Contrast Orders - past 72 hours (72h ago, onward)      None            Interpretation of levels and current regimen:  Vancomycin level is reflective  of therapeutic level    Has serum creatinine changed greater than 50% in last 72 hours: No    Urine output:  unable to determine    Renal Function: Stable    Plan:  Continue Current Dose  Vancomycin monitoring method: AUC  Vancomycin therapeutic monitoring goal: 400-600 mg*h/L  Pharmacy will check vancomycin levels as appropriate in 1-3 Days.  Serum creatinine levels will be ordered daily for the first week of therapy and at least twice weekly for subsequent weeks.    Maxwell Samayoa RPH

## 2025-07-26 ENCOUNTER — INFUSION THERAPY VISIT (OUTPATIENT)
Dept: INFUSION THERAPY | Facility: CLINIC | Age: 75
End: 2025-07-26
Attending: PHYSICIAN ASSISTANT
Payer: MEDICARE

## 2025-07-26 VITALS — RESPIRATION RATE: 18 BRPM | OXYGEN SATURATION: 95 % | HEART RATE: 60 BPM | TEMPERATURE: 97.8 F

## 2025-07-26 DIAGNOSIS — B94.8 SEQUELA OF CORYNEBACTERIUM INFECTION: Primary | ICD-10-CM

## 2025-07-26 PROCEDURE — 250N000011 HC RX IP 250 OP 636: Performed by: PHYSICIAN ASSISTANT

## 2025-07-26 PROCEDURE — 258N000003 HC RX IP 258 OP 636: Performed by: PHYSICIAN ASSISTANT

## 2025-07-26 PROCEDURE — 96365 THER/PROPH/DIAG IV INF INIT: CPT

## 2025-07-26 PROCEDURE — 96366 THER/PROPH/DIAG IV INF ADDON: CPT

## 2025-07-26 RX ORDER — ALBUTEROL SULFATE 0.83 MG/ML
2.5 SOLUTION RESPIRATORY (INHALATION)
Status: CANCELLED | OUTPATIENT
Start: 2025-07-27

## 2025-07-26 RX ORDER — METHYLPREDNISOLONE SODIUM SUCCINATE 40 MG/ML
40 INJECTION INTRAMUSCULAR; INTRAVENOUS
Status: CANCELLED
Start: 2025-07-27

## 2025-07-26 RX ORDER — HEPARIN SODIUM,PORCINE 10 UNIT/ML
5-20 VIAL (ML) INTRAVENOUS DAILY PRN
Status: CANCELLED | OUTPATIENT
Start: 2025-07-27

## 2025-07-26 RX ORDER — MEPERIDINE HYDROCHLORIDE 25 MG/ML
25 INJECTION INTRAMUSCULAR; INTRAVENOUS; SUBCUTANEOUS
Status: CANCELLED | OUTPATIENT
Start: 2025-07-27

## 2025-07-26 RX ORDER — DIPHENHYDRAMINE HYDROCHLORIDE 50 MG/ML
25 INJECTION, SOLUTION INTRAMUSCULAR; INTRAVENOUS
Status: CANCELLED
Start: 2025-07-27

## 2025-07-26 RX ORDER — ALBUTEROL SULFATE 90 UG/1
1-2 INHALANT RESPIRATORY (INHALATION)
Status: CANCELLED
Start: 2025-07-27

## 2025-07-26 RX ORDER — EPINEPHRINE 1 MG/ML
0.3 INJECTION, SOLUTION INTRAMUSCULAR; SUBCUTANEOUS EVERY 5 MIN PRN
Status: CANCELLED | OUTPATIENT
Start: 2025-07-27

## 2025-07-26 RX ORDER — HEPARIN SODIUM (PORCINE) LOCK FLUSH IV SOLN 100 UNIT/ML 100 UNIT/ML
5 SOLUTION INTRAVENOUS
Status: CANCELLED | OUTPATIENT
Start: 2025-07-27

## 2025-07-26 RX ORDER — DIPHENHYDRAMINE HYDROCHLORIDE 50 MG/ML
50 INJECTION, SOLUTION INTRAMUSCULAR; INTRAVENOUS
Status: CANCELLED
Start: 2025-07-27

## 2025-07-26 RX ORDER — HEPARIN SODIUM,PORCINE 10 UNIT/ML
5-20 VIAL (ML) INTRAVENOUS DAILY PRN
Status: DISCONTINUED | OUTPATIENT
Start: 2025-07-26 | End: 2025-07-26 | Stop reason: HOSPADM

## 2025-07-26 RX ADMIN — VANCOMYCIN HYDROCHLORIDE 1500 MG: 500 INJECTION, POWDER, LYOPHILIZED, FOR SOLUTION INTRAVENOUS at 11:28

## 2025-07-26 RX ADMIN — Medication 5 ML: at 13:09

## 2025-07-26 NOTE — PROGRESS NOTES
Nursing Note  Duane C Johnson [facility] for:   Chief Complaint   Patient presents with    Infusion     vanco     Patient seen by provider today: No   present during visit today: Not Applicable.    Note: N/A.      Intravenous Access:  PICC.    Treatment Conditions:  Not Applicable.      Post Infusion Assessment:  Patient tolerated infusion without incident.       Discharge Plan:   AVS to patient via MYCHART.  Patient will return   Future Appointments 7/26/2025 - 1/22/2026      The maximum number of appointments has been reached.        Date Visit Type Length Department Provider     7/27/2025  1:00 PM SPEC INFUSION 2 HR (120 MIN) 30 min UC SPECIALTY INFUSION UC SIPC INFUSION NURSE     7/27/2025  1:00 PM SPEC INFUSION 2 HR (120 MIN) 120 min UC ATC UC 43 SIPC    Location Instructions:     Due to road [place] on [address], travel times to this location may be longer than usual. Please plan for extra travel time and check the [place] Department of Transportation [address] project website for delay, closure, and detour information.  The [facility] and [facility] [place] ([facility]) is in a dense urban area with multiple transportation and parking options. You may wish to review options for  service and self-parking in more detail on the [facility] s website at [email or web address].  This appointment is in a hospital-based location.&nbsp; Before your visit, you may want to check with your insurance company for coverage and referral options, including cost differences between services provided in different clinic settings.&nbsp; For more information visit this link on the [employer] Website:&nbsp; [email or web address]              7/28/2025  2:00 PM LAB CENTRAL 15 min PH INFUSION SERVICES PH INFUSION NURSE     7/28/2025  2:00 PM LAB CENTRAL 15 min PH INFUSION SERVICES PH INFUSION CHAIR 1    Location Instructions:     From [address]: Exit at [facility] on south side  [facility]. Turn right on [address]. Turn left at stoplight on [address]. [facility] will be in view two blocks ahead  This appointment is in a hospital-based location.&nbsp; Before your visit, you may want to check with your insurance company for coverage and referral options, including cost differences between services provided in different clinic settings.&nbsp; For more information visit this link on the [employer] Website:&nbsp; [email or web address]              7/28/2025  2:30 PM INFUSION 2 HR (120 MIN) 30 min PH INFUSION SERVICES PH INFUSION NURSE     7/28/2025  2:30 PM INFUSION 2 HR (120 MIN) 120 min PH INFUSION SERVICES PH INFUSION CHAIR 1    Location Instructions:     From [address]: Exit at [address] on [facility]. Turn right on [address]. Turn left at stoplight on [address]. [facility] will be in view two blocks ahead  This appointment is in a hospital-based location.&nbsp; Before your visit, you may want to check with your insurance company for coverage and referral options, including cost differences between services provided in different clinic settings.&nbsp; For more information visit this link on the [employer] Website:&nbsp; [email or web address]              7/29/2025  2:30 PM INFUSION 2 HR (120 MIN) 30 min PH INFUSION SERVICES PH INFUSION NURSE     7/29/2025  2:30 PM INFUSION 2 HR (120 MIN) 120 min PH INFUSION SERVICES PH INFUSION CHAIR 1    Location Instructions:     From [address]: Exit at [address] on [facility]. Turn right on [address]. Turn left at stoplight on [address]. [facility] will be in view two blocks ahead  This appointment is in a hospital-based location.&nbsp; Before your visit, you may want to check with your insurance company for coverage and referral options, including cost differences between services provided in different clinic settings.&nbsp; For more information  visit this link on the [employer] Website:&nbsp; [email or web address]              7/30/2025  2:30 PM INFUSION 2 HR (120 MIN) 30 min PH INFUSION SERVICES PH INFUSION NURSE     7/30/2025  2:30 PM INFUSION 2 HR (120 MIN) 120 min PH INFUSION SERVICES PH INFUSION CHAIR 1    Location Instructions:     From [address]: Exit at [address] on [facility]. Turn right on [address]. Turn left at stoplight on [address]. [facility] will be in view two blocks ahead  This appointment is in a hospital-based location.&nbsp; Before your visit, you may want to check with your insurance company for coverage and referral options, including cost differences between services provided in different clinic settings.&nbsp; For more information visit this link on the [employer] Website:&nbsp; [email or web address]              7/31/2025  2:30 PM INFUSION 2 HR (120 MIN) 30 min PH INFUSION SERVICES PH INFUSION NURSE     7/31/2025  2:30 PM INFUSION 2 HR (120 MIN) 120 min PH INFUSION SERVICES PH INFUSION CHAIR 1    Location Instructions:     From [address]: Exit at [facility] [address] on [facility] [address] [facility] [address]. Turn right on [address]. Turn left at stoplight on [address]. [facility] will be in view two blocks ahead  This appointment is in a hospital-based location.&nbsp; Before your visit, you may want to check with your insurance company for coverage and referral options, including cost differences between services provided in different clinic settings.&nbsp; For more information visit this link on the [employer] Website:&nbsp; [email or web address]              8/1/2025  2:30 PM INFUSION 2 HR (120 MIN) 30 min PH INFUSION SERVICES PH INFUSION NURSE     8/1/2025  2:30 PM INFUSION 2 HR (120 MIN) 120 min PH INFUSION SERVICES PH INFUSION CHAIR 1    Location Instructions:     From [address]: Exit at [address] on [facility]. Turn right on [address]. Turn left  at stoplight on [address]. [facility] will be in view two blocks ahead  This appointment is in a hospital-based location.&nbsp; Before your visit, you may want to check with your insurance company for coverage and referral options, including cost differences between services provided in different clinic settings.&nbsp; For more information visit this link on the [employer] Website:&nbsp; [email or web address]              8/2/2025 11:00 AM SPEC INFUSION 2 HR (120 MIN) 30 min UC SPECIALTY INFUSION UC SIPC INFUSION NURSE     8/2/2025 11:00 AM SPEC INFUSION 2 HR (120 MIN) 120 min UC ATC UC 44 SIPC    Location Instructions:     Due to road [place] on [address], travel times to this location may be longer than usual. Please plan for extra travel time and check the [place] [employer] [place] project website for delay, closure, and detour information.  The [facility] [place] ([facility] is in a dense urban area with multiple transportation and parking options. You may wish to review options for  service and self-parking in more detail on the [facility] s website at [email or web address].  This appointment is in a hospital-based location.&nbsp; Before your visit, you may want to check with your insurance company for coverage and referral options, including cost differences between services provided in different clinic settings.&nbsp; For more information visit this link on the [employer] Website:&nbsp; [email or web address]              8/3/2025 11:00 AM SPEC INFUSION 2 HR (120 MIN) 30 min UC SPECIALTY INFUSION UC SIPC INFUSION NURSE     8/3/2025 11:00 AM SPEC INFUSION 2 HR (120 MIN) 120 min UC ATC UC 46 SIPC    Location Instructions:     Due to road construction on [address], travel times to this location may be longer than usual. Please plan for extra travel time and check the [place] [employer] [address] [place] website for delay, closure, and detour  information.  The [facility] [place] is in a dense urban area with multiple transportation and parking options. You may wish to review options for  service and self-parking in more detail on the [facility] website at [email or web address].  This appointment is in a hospital-based location.&nbsp; Before your visit, you may want to check with your insurance company for coverage and referral options, including cost differences between services provided in different clinic settings.&nbsp; For more information visit this link on the [employer] Website:&nbsp; [email or web address]              8/4/2025  1:00 PM NEW ID LVAD 60 min UC INFECTIOUS DISEASE Consuelo Villela MD    Location Instructions:     Due to road construction on [address], travel times to this location may be longer than usual. Please plan for extra travel time and check the [employer] [address] project website for delay, closure, and detour information.  The [facility] [place] ([facility] is in a dense urban area with multiple transportation and parking options. You may wish to review options for  service and self-parking in more detail on the [facility] website at [email or web address].  This appointment is in a hospital-based location. Before your visit, you may want to check with your insurance company for coverage and referral options, including cost differences between services provided in different clinic settings. For more information visit this link on the [employer] Website: [email or web address]              8/6/2025  1:30 PM RETURN RADIATION ONCOLOGY 30 min LR UMP RAD THERAPY Yara Nicolas PA-C    Location Instructions:     [facility] and [facility] are located between [address] and [facility] in [place], four miles north of [place].                     for next appointment.       Jenny Singh,  RN    Administrations This Visit       heparin lock flush 10 unit/mL injection 5-20 mL       Admin Date  07/26/2025 Action  $Given Dose  5 mL Route  Intracatheter Documented By  Delores Lindsey, BLADE              sodium chloride (PF) 0.9% PF flush 3-20 mL       Admin Date  07/26/2025 Action  $Given Dose  10 mL Route  Intracatheter Documented By  Delores Lindsey, RN              vancomycin (VANCOCIN) 1,500 mg in sodium chloride 0.9 % 290 mL intermittent infusion       Admin Date  07/26/2025 Action  $New Bag Dose  1,500 mg Rate  193.3 mL/hr Route  Intravenous Documented By  Jenny Singh NP                    Pulse 60   Temp 97.8  F (36.6  C) (Oral)   Resp 18   SpO2 95%

## 2025-07-27 ENCOUNTER — INFUSION THERAPY VISIT (OUTPATIENT)
Dept: INFUSION THERAPY | Facility: CLINIC | Age: 75
End: 2025-07-27
Attending: PHYSICIAN ASSISTANT
Payer: MEDICARE

## 2025-07-27 VITALS — RESPIRATION RATE: 18 BRPM | HEART RATE: 88 BPM | TEMPERATURE: 97.6 F | OXYGEN SATURATION: 100 %

## 2025-07-27 DIAGNOSIS — B94.8 SEQUELA OF CORYNEBACTERIUM INFECTION: Primary | ICD-10-CM

## 2025-07-27 PROCEDURE — 258N000003 HC RX IP 258 OP 636: Performed by: PHYSICIAN ASSISTANT

## 2025-07-27 PROCEDURE — 96365 THER/PROPH/DIAG IV INF INIT: CPT

## 2025-07-27 PROCEDURE — 250N000011 HC RX IP 250 OP 636: Performed by: PHYSICIAN ASSISTANT

## 2025-07-27 RX ORDER — DIPHENHYDRAMINE HYDROCHLORIDE 50 MG/ML
50 INJECTION, SOLUTION INTRAMUSCULAR; INTRAVENOUS
Status: CANCELLED
Start: 2025-07-28

## 2025-07-27 RX ORDER — MEPERIDINE HYDROCHLORIDE 25 MG/ML
25 INJECTION INTRAMUSCULAR; INTRAVENOUS; SUBCUTANEOUS
Status: CANCELLED | OUTPATIENT
Start: 2025-07-28

## 2025-07-27 RX ORDER — DIPHENHYDRAMINE HYDROCHLORIDE 50 MG/ML
25 INJECTION, SOLUTION INTRAMUSCULAR; INTRAVENOUS
Status: CANCELLED
Start: 2025-07-28

## 2025-07-27 RX ORDER — METHYLPREDNISOLONE SODIUM SUCCINATE 40 MG/ML
40 INJECTION INTRAMUSCULAR; INTRAVENOUS
Status: CANCELLED
Start: 2025-07-28

## 2025-07-27 RX ORDER — ALBUTEROL SULFATE 90 UG/1
1-2 INHALANT RESPIRATORY (INHALATION)
Status: CANCELLED
Start: 2025-07-28

## 2025-07-27 RX ORDER — ALBUTEROL SULFATE 0.83 MG/ML
2.5 SOLUTION RESPIRATORY (INHALATION)
Status: CANCELLED | OUTPATIENT
Start: 2025-07-28

## 2025-07-27 RX ORDER — HEPARIN SODIUM,PORCINE 10 UNIT/ML
5-20 VIAL (ML) INTRAVENOUS DAILY PRN
Status: DISCONTINUED | OUTPATIENT
Start: 2025-07-27 | End: 2025-07-27 | Stop reason: HOSPADM

## 2025-07-27 RX ORDER — EPINEPHRINE 1 MG/ML
0.3 INJECTION, SOLUTION INTRAMUSCULAR; SUBCUTANEOUS EVERY 5 MIN PRN
Status: CANCELLED | OUTPATIENT
Start: 2025-07-28

## 2025-07-27 RX ORDER — HEPARIN SODIUM (PORCINE) LOCK FLUSH IV SOLN 100 UNIT/ML 100 UNIT/ML
5 SOLUTION INTRAVENOUS
Status: CANCELLED | OUTPATIENT
Start: 2025-07-28

## 2025-07-27 RX ORDER — HEPARIN SODIUM,PORCINE 10 UNIT/ML
5-20 VIAL (ML) INTRAVENOUS DAILY PRN
Status: CANCELLED | OUTPATIENT
Start: 2025-07-28

## 2025-07-27 RX ADMIN — VANCOMYCIN HYDROCHLORIDE 1500 MG: 500 INJECTION, POWDER, LYOPHILIZED, FOR SOLUTION INTRAVENOUS at 13:22

## 2025-07-27 RX ADMIN — Medication 5 ML: at 14:49

## 2025-07-27 NOTE — PROGRESS NOTES
Infusion Nursing Note:  Duane C Johnson presents today for vancomycin.    Patient seen by provider today: No   present during visit today: Not Applicable.    Note:   Infusion over 90 minutes.    Intravenous Access:  PICC.    Treatment Conditions:  Not Applicable.    Post Infusion Assessment:  Patient tolerated infusion without incident.  Blood return noted pre and post infusion.  Site patent and intact, free from redness, edema or discomfort.  No evidence of extravasations.     Discharge Plan:   Discharge instructions reviewed with: Patient.  Patient and/or family verbalized understanding of discharge instructions and all questions answered.  AVS to patient via [employer].  Patient will return tomorrow to [place] infusion clinic for next appointment.   Patient discharged in stable condition accompanied by: wife.  Departure Mode: Ambulatory.    Administrations This Visit       heparin lock flush 10 unit/mL injection 5-20 mL       Admin Date  07/27/2025 Action  $Given Dose  5 mL Route  Intracatheter Documented By  Vannessa Grullon RN              vancomycin (VANCOCIN) 1,500 mg in sodium chloride 0.9 % 290 mL intermittent infusion       Admin Date  07/27/2025 Action  $New Bag Dose  1,500 mg Rate  193.3 mL/hr Route  Intravenous Documented By  Vannessa Grullon RN                  Pulse 88   Temp 97.6  F (36.4  C) (Oral)   Resp 18   SpO2 100%     Vannessa Grullon RN

## 2025-07-28 ENCOUNTER — INFUSION THERAPY VISIT (OUTPATIENT)
Dept: INFUSION THERAPY | Facility: CLINIC | Age: 75
End: 2025-07-28
Attending: PHYSICIAN ASSISTANT
Payer: MEDICARE

## 2025-07-28 ENCOUNTER — ANTICOAGULATION THERAPY VISIT (OUTPATIENT)
Dept: ANTICOAGULATION | Facility: CLINIC | Age: 75
End: 2025-07-28

## 2025-07-28 ENCOUNTER — CARE COORDINATION (OUTPATIENT)
Dept: CARDIOLOGY | Facility: CLINIC | Age: 75
End: 2025-07-28

## 2025-07-28 ENCOUNTER — TELEPHONE (OUTPATIENT)
Dept: INFECTIOUS DISEASES | Facility: CLINIC | Age: 75
End: 2025-07-28

## 2025-07-28 VITALS — DIASTOLIC BLOOD PRESSURE: 64 MMHG | SYSTOLIC BLOOD PRESSURE: 85 MMHG | HEART RATE: 63 BPM

## 2025-07-28 DIAGNOSIS — Z95.811 LVAD (LEFT VENTRICULAR ASSIST DEVICE) PRESENT (H): ICD-10-CM

## 2025-07-28 DIAGNOSIS — I95.89 OTHER SPECIFIED HYPOTENSION: ICD-10-CM

## 2025-07-28 DIAGNOSIS — I50.22 CHRONIC SYSTOLIC CONGESTIVE HEART FAILURE (H): Primary | ICD-10-CM

## 2025-07-28 DIAGNOSIS — Z79.01 ANTICOAGULATED ON WARFARIN: ICD-10-CM

## 2025-07-28 DIAGNOSIS — B94.8 SEQUELA OF CORYNEBACTERIUM INFECTION: Primary | ICD-10-CM

## 2025-07-28 DIAGNOSIS — C61 PROSTATE CANCER (H): ICD-10-CM

## 2025-07-28 DIAGNOSIS — I50.22 CHRONIC SYSTOLIC CONGESTIVE HEART FAILURE (H): ICD-10-CM

## 2025-07-28 DIAGNOSIS — I50.42 CHRONIC COMBINED SYSTOLIC AND DIASTOLIC HEART FAILURE (H): ICD-10-CM

## 2025-07-28 LAB
CREAT SERPL-MCNC: 1.28 MG/DL (ref 0.67–1.17)
EGFRCR SERPLBLD CKD-EPI 2021: 58 ML/MIN/1.73M2
INR PPP: 3.12 (ref 0.85–1.15)
PROTHROMBIN TIME: 31 SECONDS (ref 11.8–14.8)
PSA SERPL DL<=0.01 NG/ML-MCNC: 0.04 NG/ML (ref 0–6.5)
VANCOMYCIN SERPL-MCNC: 21.4 UG/ML (ref ?–25)

## 2025-07-28 PROCEDURE — 85610 PROTHROMBIN TIME: CPT

## 2025-07-28 PROCEDURE — 96366 THER/PROPH/DIAG IV INF ADDON: CPT

## 2025-07-28 PROCEDURE — 250N000011 HC RX IP 250 OP 636: Performed by: PHYSICIAN ASSISTANT

## 2025-07-28 PROCEDURE — 258N000003 HC RX IP 258 OP 636: Performed by: PHYSICIAN ASSISTANT

## 2025-07-28 PROCEDURE — 82565 ASSAY OF CREATININE: CPT | Performed by: PHYSICIAN ASSISTANT

## 2025-07-28 PROCEDURE — 84153 ASSAY OF PSA TOTAL: CPT

## 2025-07-28 PROCEDURE — 36415 COLL VENOUS BLD VENIPUNCTURE: CPT

## 2025-07-28 PROCEDURE — 80202 ASSAY OF VANCOMYCIN: CPT | Performed by: PHYSICIAN ASSISTANT

## 2025-07-28 PROCEDURE — 96365 THER/PROPH/DIAG IV INF INIT: CPT

## 2025-07-28 RX ORDER — HEPARIN SODIUM (PORCINE) LOCK FLUSH IV SOLN 100 UNIT/ML 100 UNIT/ML
5 SOLUTION INTRAVENOUS
Status: CANCELLED | OUTPATIENT
Start: 2025-07-29

## 2025-07-28 RX ORDER — DIPHENHYDRAMINE HYDROCHLORIDE 50 MG/ML
25 INJECTION, SOLUTION INTRAMUSCULAR; INTRAVENOUS
Status: CANCELLED
Start: 2025-07-29

## 2025-07-28 RX ORDER — DIPHENHYDRAMINE HYDROCHLORIDE 50 MG/ML
50 INJECTION, SOLUTION INTRAMUSCULAR; INTRAVENOUS
Status: CANCELLED
Start: 2025-07-29

## 2025-07-28 RX ORDER — MEPERIDINE HYDROCHLORIDE 25 MG/ML
25 INJECTION INTRAMUSCULAR; INTRAVENOUS; SUBCUTANEOUS
Status: CANCELLED | OUTPATIENT
Start: 2025-07-29

## 2025-07-28 RX ORDER — EPINEPHRINE 1 MG/ML
0.3 INJECTION, SOLUTION, CONCENTRATE INTRAVENOUS EVERY 5 MIN PRN
Status: CANCELLED | OUTPATIENT
Start: 2025-07-29

## 2025-07-28 RX ORDER — METHYLPREDNISOLONE SODIUM SUCCINATE 40 MG/ML
40 INJECTION INTRAMUSCULAR; INTRAVENOUS
Status: CANCELLED
Start: 2025-07-29

## 2025-07-28 RX ORDER — HEPARIN SODIUM,PORCINE 10 UNIT/ML
5-20 VIAL (ML) INTRAVENOUS DAILY PRN
Status: CANCELLED | OUTPATIENT
Start: 2025-07-29

## 2025-07-28 RX ORDER — ALBUTEROL SULFATE 0.83 MG/ML
2.5 SOLUTION RESPIRATORY (INHALATION)
Status: CANCELLED | OUTPATIENT
Start: 2025-07-29

## 2025-07-28 RX ORDER — ALBUTEROL SULFATE 90 UG/1
1-2 INHALANT RESPIRATORY (INHALATION)
Status: CANCELLED
Start: 2025-07-29

## 2025-07-28 RX ADMIN — VANCOMYCIN HYDROCHLORIDE 1500 MG: 1 INJECTION, POWDER, LYOPHILIZED, FOR SOLUTION INTRAVENOUS at 14:34

## 2025-07-28 RX ADMIN — SODIUM CHLORIDE 100 ML: 9 INJECTION, SOLUTION INTRAVENOUS at 14:31

## 2025-07-28 NOTE — PHARMACY-VANCOMYCIN DOSING SERVICE
Pharmacy Vancomycin Note  Date of Service 2025  Patient's  1950   75 year old, male    Indication: LVAD Driveline Infection  Day of Therapy: 12  Current vancomycin regimen:  1500 mg IV q24h  Current vancomycin monitoring method: AUC  Current vancomycin therapeutic monitoring goal: 400-600 mg*h/L    InsightRX Prediction of Current Vancomycin Regimen  Loading dose: N/A  Regimen: 1500 mg IV every 24 hours.  Start time: 14:19 on 2025  Exposure target: AUC24 (range) 400-600 mg/L.hr   AUC24,ss: 541 mg/L.hr  Probability of AUC24 > 400: >95 %  Ctrough,ss: 16.2 mg/L  Probability of Ctrough,ss > 20: 7 %  Probability of nephrotoxicity (Lodise KARLA ): 12 %    Current estimated CrCl = Estimated Creatinine Clearance: 65.4 mL/min (based on SCr of 1.07 mg/dL).    Creatinine for last 3 days  No results found for requested labs within last 3 days.    Recent Vancomycin Levels (past 3 days)  No results found for requested labs within last 3 days.    Vancomycin IV Administrations (past 72 hours)                     vancomycin (VANCOCIN) 1,500 mg in sodium chloride 0.9 % 290 mL intermittent infusion (mg) 1,500 mg New Bag 25 1322    vancomycin (VANCOCIN) 1,500 mg in sodium chloride 0.9 % 290 mL intermittent infusion (mg) 1,500 mg New Bag 25 1128                    Nephrotoxins and other renal medications (From now, onward)      Start     Dose/Rate Route Frequency Ordered Stop    25 1415  vancomycin (VANCOCIN) 1,500 mg in 0.9% NaCl 250 mL intermittent infusion        Note to Pharmacy: Phamacist o adjust- AUC target is 400-600    1,500 mg  over 90 Minutes Intravenous EVERY 24 HOURS 25 1412                 Contrast Orders - past 72 hours (72h ago, onward)      None          Interpretation of levels and current regimen:  Vancomycin level is reflective of therapeutic level    Has serum creatinine changed greater than 50% in last 72 hours: Scr last collected at 2025    Urine output:  unable  to determine    Renal Function: Stable    InsightRX Prediction of Planned New Vancomycin Regimen  Loading dose: N/A  Regimen: 1500 mg IV every 24 hours.  Start time: 14:19 on 07/28/2025  Exposure target: AUC24 (range) 400-600 mg/L.hr   AUC24,ss: 541 mg/L.hr  Probability of AUC24 > 400: >95 %  Ctrough,ss: 16.2 mg/L  Probability of Ctrough,ss > 20: 7 %  Probability of nephrotoxicity (Lodise KARLA 2009): 12 %      Plan:  Continue Current Dose  Vancomycin monitoring method: AUC  Vancomycin therapeutic monitoring goal: 400-600 mg*h/L  Pharmacy will check vancomycin levels as appropriate in 1-3 Days.  Serum creatinine levels will be ordered daily for the first week of therapy and at least twice weekly for subsequent weeks.    Emily Cazares, PharmD

## 2025-07-28 NOTE — PROGRESS NOTES
Infusion Nursing Note:  Duane C Johnson presents today for PICC labs.    Patient seen by provider today: No   present during visit today: Not Applicable.    Note: N/A.      Intravenous Access:  Labs drawn without difficulty.  PICC.      Dianna Guevara RN

## 2025-07-28 NOTE — PROGRESS NOTES
ANTICOAGULATION MANAGEMENT     Duane C Johnson 75 year old male is on warfarin with supratherapeutic INR result. (Goal INR 2.0-2.5)    Recent labs: (last 7 days)     07/28/25  1438   INR 3.12*       ASSESSMENT     Source(s): Chart Review and Patient/Caregiver Call     Warfarin doses taken: Warfarin taken as instructed  Diet: No new diet changes identified  Medication/supplement changes: Vanco to be completed 8/3. Will see provider on Monday to assess need for continued.   New illness, injury, or hospitalization: No- continued with DLES infection  Signs or symptoms of bleeding or clotting: No  Previous result: Supratherapeutic  Additional findings: None       PLAN     Recommended plan for ongoing change(s) affecting INR     Dosing Instructions: Hold tomorrow and partial hold Wednesday. Then continue normal dose on Thursday with next INR in 4 days       Summary  As of 7/28/2025      Full warfarin instructions:  7/29: Hold; 7/30: 1.25 mg; Otherwise 2.5 mg every Sun, Wed; 1.25 mg all other days   Next INR check:  8/1/2025               Telephone call with Naz who verbalizes understanding and agrees to plan and who agrees to plan and repeated back plan correctly    Lab visit scheduled    Education provided: Please call back if any changes to your diet, medications or how you've been taking warfarin    Plan made with [facility] Pharmacist Sindy Burnette and per LVAD protocol    Carlyn Prince RN  7/28/2025  Anticoagulation Clinic  [employer] for routing messages: p ANTICOAG LVAD  [facility] patient phone line: [phone]        _______________________________________________________________________     Anticoagulation Episode Summary       Current INR goal:  2.0-2.5   TTR:  70.1% (8.1 mo)   Target end date:  Indefinite   Send INR reminders to:  ANTICOAG LVAD    Indications    Chronic systolic congestive heart failure (H) [I50.22]  Anticoagulated on warfarin [Z79.01]  LVAD (left ventricular assist device) present (H) [Z95.811]  Other  specified hypotension [I95.89]  Chronic combined systolic and diastolic heart failure (H) [I50.42]             Comments:  Follow VAD Anticoag protocol:Yes: HeartMate 3  Bridging: Enoxaparin  Date VAD placed: 9/18/24             Anticoagulation Care Providers       Provider Role Specialty Phone number    Ham Cruz MD Referring Advanced Heart Failure and Transplant Cardiology [phone]    Sravanthi Asencio MD Referring Cardiovascular Disease [phone]    Torrie Forrest PARUPAL Referring Cardiovascular Disease [phone]

## 2025-07-29 ENCOUNTER — INFUSION THERAPY VISIT (OUTPATIENT)
Dept: INFUSION THERAPY | Facility: CLINIC | Age: 75
End: 2025-07-29
Attending: PHYSICIAN ASSISTANT
Payer: MEDICARE

## 2025-07-29 VITALS
BODY MASS INDEX: 26.81 KG/M2 | SYSTOLIC BLOOD PRESSURE: 99 MMHG | WEIGHT: 171.2 LBS | HEART RATE: 60 BPM | DIASTOLIC BLOOD PRESSURE: 64 MMHG | OXYGEN SATURATION: 99 %

## 2025-07-29 DIAGNOSIS — B94.8 SEQUELA OF CORYNEBACTERIUM INFECTION: Primary | ICD-10-CM

## 2025-07-29 PROCEDURE — 96365 THER/PROPH/DIAG IV INF INIT: CPT

## 2025-07-29 PROCEDURE — 96366 THER/PROPH/DIAG IV INF ADDON: CPT

## 2025-07-29 PROCEDURE — 258N000003 HC RX IP 258 OP 636: Performed by: PHYSICIAN ASSISTANT

## 2025-07-29 PROCEDURE — 250N000011 HC RX IP 250 OP 636: Performed by: PHYSICIAN ASSISTANT

## 2025-07-29 RX ORDER — ALBUTEROL SULFATE 0.83 MG/ML
2.5 SOLUTION RESPIRATORY (INHALATION)
Status: CANCELLED | OUTPATIENT
Start: 2025-07-30

## 2025-07-29 RX ORDER — EPINEPHRINE 1 MG/ML
0.3 INJECTION, SOLUTION, CONCENTRATE INTRAVENOUS EVERY 5 MIN PRN
Status: CANCELLED | OUTPATIENT
Start: 2025-07-30

## 2025-07-29 RX ORDER — DIPHENHYDRAMINE HYDROCHLORIDE 50 MG/ML
50 INJECTION, SOLUTION INTRAMUSCULAR; INTRAVENOUS
Status: CANCELLED
Start: 2025-07-30

## 2025-07-29 RX ORDER — HEPARIN SODIUM,PORCINE 10 UNIT/ML
5-20 VIAL (ML) INTRAVENOUS DAILY PRN
Status: CANCELLED | OUTPATIENT
Start: 2025-07-30

## 2025-07-29 RX ORDER — METHYLPREDNISOLONE SODIUM SUCCINATE 40 MG/ML
40 INJECTION INTRAMUSCULAR; INTRAVENOUS
Status: CANCELLED
Start: 2025-07-30

## 2025-07-29 RX ORDER — MEPERIDINE HYDROCHLORIDE 25 MG/ML
25 INJECTION INTRAMUSCULAR; INTRAVENOUS; SUBCUTANEOUS
Status: CANCELLED | OUTPATIENT
Start: 2025-07-30

## 2025-07-29 RX ORDER — DIPHENHYDRAMINE HYDROCHLORIDE 50 MG/ML
25 INJECTION, SOLUTION INTRAMUSCULAR; INTRAVENOUS
Status: CANCELLED
Start: 2025-07-30

## 2025-07-29 RX ORDER — ALBUTEROL SULFATE 90 UG/1
1-2 INHALANT RESPIRATORY (INHALATION)
Status: CANCELLED
Start: 2025-07-30

## 2025-07-29 RX ORDER — HEPARIN SODIUM (PORCINE) LOCK FLUSH IV SOLN 100 UNIT/ML 100 UNIT/ML
5 SOLUTION INTRAVENOUS
Status: CANCELLED | OUTPATIENT
Start: 2025-07-30

## 2025-07-29 RX ORDER — HEPARIN SODIUM,PORCINE 10 UNIT/ML
5-20 VIAL (ML) INTRAVENOUS DAILY PRN
Status: DISCONTINUED | OUTPATIENT
Start: 2025-07-29 | End: 2025-07-29 | Stop reason: HOSPADM

## 2025-07-29 RX ADMIN — SODIUM CHLORIDE 100 ML: 9 INJECTION, SOLUTION INTRAVENOUS at 14:38

## 2025-07-29 RX ADMIN — VANCOMYCIN HYDROCHLORIDE 1250 MG: 1 INJECTION, POWDER, LYOPHILIZED, FOR SOLUTION INTRAVENOUS at 14:52

## 2025-07-29 RX ADMIN — HEPARIN, PORCINE (PF) 10 UNIT/ML INTRAVENOUS SYRINGE 5 ML: at 16:42

## 2025-07-29 NOTE — TELEPHONE ENCOUNTER
Therapy plan updated with weekly lab orders for ID:  CBC with Diff, SCr, ALT, AST, Vanco     Scarlet Lemus, SANDIEN, RN  RN Care Coordinator Infectious Disease Clinic

## 2025-07-29 NOTE — PROGRESS NOTES
Infusion Nursing Note:  Duane C Johnson presents today for Vancomycin infusion.    Patient seen by provider today: No   present during visit today: Not Applicable.    Note: Duane comes up to unit today in wheelchair pushed by his wife.  No changes.  No new concerns. .      Intravenous Access:  PICC.    Treatment Conditions:  Not Applicable.      Post Infusion Assessment:  Patient tolerated infusion without incident.  Blood return noted pre and post infusion.       Discharge Plan:   Patient discharged in stable condition accompanied by: self and wife.  Departure Mode: Wheelchair.  Patient has appt to return to infusion tomorrow. .      Leda Reynolds RN

## 2025-07-29 NOTE — PHARMACY-VANCOMYCIN DOSING SERVICE
Pharmacy Vancomycin Note  Date of Service 2025  Patient's  1950   75 year old, male    Indication: LVAD driveline infection   Day of Therapy: 15 (first dose on 7/15/2025)  Current vancomycin regimen: 1500 mg IV q24h  Current vancomycin monitoring method: AUC  Current vancomycin therapeutic monitoring goal: 400-600 mg*h/L    [employer]ight[employer] Prediction of Current Vancomycin Regimen  Loading dose: N/A  Regimen: 1500 mg IV every 24 hours.  Start time: 14:34 on 2025  Exposure target: AUC24 (range) 400-600 mg/L.hr   AUC24,ss: 666 mg/L.hr  Probability of AUC24 > 400: >95 %  Ctrough,ss: 20.7 mg/L  Probability of Ctrough,ss > 20: 60 %  Probability of nephrotoxicity (Lodise KARLA ): 19 %    Current estimated CrCl = Estimated Creatinine Clearance: 54.7 mL/min (A) (based on SCr of 1.28 mg/dL (H)).    Creatinine for last 3 days  2025:  2:38 PM Creatinine 1.28 mg/dL    Recent Vancomycin Levels (past 3 days)  2025:  2:38 PM Vancomycin 21.4 ug/mL    Vancomycin IV Administrations (past 72 hours)                     vancomycin (VANCOCIN) 1,500 mg in 0.9% NaCl 250 mL intermittent infusion (mg) 1,500 mg New Bag 25 1434    vancomycin (VANCOCIN) 1,500 mg in sodium chloride 0.9 % 290 mL intermittent infusion (mg) 1,500 mg New Bag 25 1322                    Nephrotoxins and other renal medications (From now, onward)      Start     Dose/Rate Route Frequency Ordered Stop    25 1430  vancomycin (VANCOCIN) 1,250 mg in 0.9% NaCl 250 mL intermittent infusion        Note to Pharmacy: Phamacist o adjust- AUC target is 400-600    1,250 mg  over 90 Minutes Intravenous EVERY 24 HOURS 25 1424                 Contrast Orders - past 72 hours (72h ago, onward)      None            Interpretation of levels and current regimen:  Vancomycin level is reflective of AUC greater than 600    Has serum creatinine changed greater than 50% in last 72 hours: No    Urine output:  unable to determine    Renal  Function: Worsening    [employer] Prediction of Planned New Vancomycin Regimen  Loading dose: N/A  Regimen: 1250 mg IV every 24 hours.  Start time: 14:34 on 07/29/2025  Exposure target: AUC24 (range) 400-600 mg/L.hr   AUC24,ss: 557 mg/L.hr  Probability of AUC24 > 400: >95 %  Ctrough,ss: 17.3 mg/L  Probability of Ctrough,ss > 20: 15 %  Probability of nephrotoxicity (Lodise KARLA 2009): 13 %      Plan:  Decrease Dose to vancomycin 1250 mg IV every 24 hours. Pharmacy placed an order for a recheck of creatinine and vancomycin level to be drawn on Thursday 7/31/2025.  Vancomycin monitoring method: AUC  Vancomycin therapeutic monitoring goal: 400-600 mg*h/L  Pharmacy will check vancomycin levels as appropriate in 3-5 Days.  Serum creatinine levels will be ordered a minimum of twice weekly.    Pat Willson, [facility]

## 2025-07-30 ENCOUNTER — TELEPHONE (OUTPATIENT)
Dept: INFECTIOUS DISEASES | Facility: CLINIC | Age: 75
End: 2025-07-30
Payer: MEDICARE

## 2025-07-30 ENCOUNTER — INFUSION THERAPY VISIT (OUTPATIENT)
Dept: INFUSION THERAPY | Facility: CLINIC | Age: 75
End: 2025-07-30
Attending: PHYSICIAN ASSISTANT
Payer: MEDICARE

## 2025-07-30 VITALS
SYSTOLIC BLOOD PRESSURE: 92 MMHG | RESPIRATION RATE: 20 BRPM | TEMPERATURE: 98 F | HEART RATE: 88 BPM | OXYGEN SATURATION: 98 % | DIASTOLIC BLOOD PRESSURE: 55 MMHG

## 2025-07-30 DIAGNOSIS — B94.8 SEQUELA OF CORYNEBACTERIUM INFECTION: Primary | ICD-10-CM

## 2025-07-30 PROCEDURE — 258N000003 HC RX IP 258 OP 636: Performed by: INTERNAL MEDICINE

## 2025-07-30 PROCEDURE — 250N000011 HC RX IP 250 OP 636: Performed by: INTERNAL MEDICINE

## 2025-07-30 PROCEDURE — 96365 THER/PROPH/DIAG IV INF INIT: CPT

## 2025-07-30 PROCEDURE — 258N000003 HC RX IP 258 OP 636: Performed by: PHYSICIAN ASSISTANT

## 2025-07-30 PROCEDURE — 250N000011 HC RX IP 250 OP 636: Performed by: PHYSICIAN ASSISTANT

## 2025-07-30 PROCEDURE — 96366 THER/PROPH/DIAG IV INF ADDON: CPT

## 2025-07-30 RX ORDER — DIPHENHYDRAMINE HYDROCHLORIDE 50 MG/ML
25 INJECTION, SOLUTION INTRAMUSCULAR; INTRAVENOUS
Status: CANCELLED
Start: 2025-07-31

## 2025-07-30 RX ORDER — ALBUTEROL SULFATE 90 UG/1
1-2 INHALANT RESPIRATORY (INHALATION)
Status: CANCELLED
Start: 2025-07-31

## 2025-07-30 RX ORDER — MEPERIDINE HYDROCHLORIDE 25 MG/ML
25 INJECTION INTRAMUSCULAR; INTRAVENOUS; SUBCUTANEOUS
Status: CANCELLED | OUTPATIENT
Start: 2025-07-31

## 2025-07-30 RX ORDER — HEPARIN SODIUM,PORCINE 10 UNIT/ML
5-20 VIAL (ML) INTRAVENOUS DAILY PRN
Status: CANCELLED | OUTPATIENT
Start: 2025-07-31

## 2025-07-30 RX ORDER — METHYLPREDNISOLONE SODIUM SUCCINATE 40 MG/ML
40 INJECTION INTRAMUSCULAR; INTRAVENOUS
Status: CANCELLED
Start: 2025-07-31

## 2025-07-30 RX ORDER — EPINEPHRINE 1 MG/ML
0.3 INJECTION, SOLUTION, CONCENTRATE INTRAVENOUS EVERY 5 MIN PRN
Status: CANCELLED | OUTPATIENT
Start: 2025-07-31

## 2025-07-30 RX ORDER — HEPARIN SODIUM,PORCINE 10 UNIT/ML
5-20 VIAL (ML) INTRAVENOUS DAILY PRN
Status: DISCONTINUED | OUTPATIENT
Start: 2025-07-30 | End: 2025-07-30 | Stop reason: HOSPADM

## 2025-07-30 RX ORDER — ALBUTEROL SULFATE 0.83 MG/ML
2.5 SOLUTION RESPIRATORY (INHALATION)
Status: CANCELLED | OUTPATIENT
Start: 2025-07-31

## 2025-07-30 RX ORDER — HEPARIN SODIUM (PORCINE) LOCK FLUSH IV SOLN 100 UNIT/ML 100 UNIT/ML
5 SOLUTION INTRAVENOUS
Status: CANCELLED | OUTPATIENT
Start: 2025-07-31

## 2025-07-30 RX ORDER — DIPHENHYDRAMINE HYDROCHLORIDE 50 MG/ML
50 INJECTION, SOLUTION INTRAMUSCULAR; INTRAVENOUS
Status: CANCELLED
Start: 2025-07-31

## 2025-07-30 RX ADMIN — SODIUM CHLORIDE 100 ML: 9 INJECTION, SOLUTION INTRAVENOUS at 14:47

## 2025-07-30 RX ADMIN — HEPARIN, PORCINE (PF) 10 UNIT/ML INTRAVENOUS SYRINGE 10 ML: at 16:38

## 2025-07-30 RX ADMIN — VANCOMYCIN HYDROCHLORIDE 1250 MG: 1 INJECTION, POWDER, LYOPHILIZED, FOR SOLUTION INTRAVENOUS at 14:48

## 2025-07-30 ASSESSMENT — PAIN SCALES - GENERAL: PAINLEVEL_OUTOF10: NO PAIN (0)

## 2025-07-30 NOTE — PROGRESS NOTES
Infusion Nursing Note:  Duane C Johnson presents today for Vancomycin and dressing change.    Patient seen by provider today: No   present during visit today: Not Applicable.    Note: Patient is here with his wife for vancomycin infusion.  Feeling well. No new concerns today.       Intravenous Access:  PICC.    Treatment Conditions:  Not Applicable.      Post Infusion Assessment:  Patient tolerated infusion without incident.  Blood return noted pre and post infusion.       Discharge Plan:   Patient discharged in stable condition accompanied by: self and wife.  Departure Mode: Ambulatory.  Patient has appt to return to infusion tomorrow for labs and infusion.      Leda Reynolds RN

## 2025-07-30 NOTE — TELEPHONE ENCOUNTER
Follow up call to [facility].  At discharge, patient was set up to go to the [facility] daily for his Vanco administration.  This RN confirmed that they are monitoring his Vanco levels. Vanco dose decreased to 1250mg on 7/29 based on the level from 7/28 of 21.4.  Next Vanco level to be checked 7/31 at infusion center.    Will send update to Dr. Sarah.    Scarlet Lemus, SANDIEN, RN  RN Care Coordinator [facility]

## 2025-07-31 ENCOUNTER — INFUSION THERAPY VISIT (OUTPATIENT)
Dept: INFUSION THERAPY | Facility: CLINIC | Age: 75
End: 2025-07-31
Attending: PHYSICIAN ASSISTANT
Payer: MEDICARE

## 2025-07-31 ENCOUNTER — TELEPHONE (OUTPATIENT)
Dept: INFECTIOUS DISEASES | Facility: CLINIC | Age: 75
End: 2025-07-31

## 2025-07-31 VITALS
TEMPERATURE: 97.8 F | OXYGEN SATURATION: 98 % | RESPIRATION RATE: 16 BRPM | HEART RATE: 53 BPM | DIASTOLIC BLOOD PRESSURE: 62 MMHG | SYSTOLIC BLOOD PRESSURE: 90 MMHG

## 2025-07-31 DIAGNOSIS — B94.8 SEQUELA OF CORYNEBACTERIUM INFECTION: ICD-10-CM

## 2025-07-31 DIAGNOSIS — B94.8 SEQUELA OF CORYNEBACTERIUM INFECTION: Primary | ICD-10-CM

## 2025-07-31 LAB
ALT SERPL W P-5'-P-CCNC: 23 U/L (ref 0–70)
AST SERPL W P-5'-P-CCNC: 30 U/L (ref 0–45)
BASOPHILS # BLD AUTO: 0.1 10E3/UL (ref 0–0.2)
BASOPHILS NFR BLD AUTO: 2 %
CREAT SERPL-MCNC: 1.44 MG/DL (ref 0.67–1.17)
EGFRCR SERPLBLD CKD-EPI 2021: 51 ML/MIN/1.73M2
EOSINOPHIL # BLD AUTO: 0.1 10E3/UL (ref 0–0.7)
EOSINOPHIL NFR BLD AUTO: 3 %
ERYTHROCYTE [DISTWIDTH] IN BLOOD BY AUTOMATED COUNT: 15.5 % (ref 10–15)
HCT VFR BLD AUTO: 25.9 % (ref 40–53)
HGB BLD-MCNC: 8.5 G/DL (ref 13.3–17.7)
IMM GRANULOCYTES # BLD: 0 10E3/UL
IMM GRANULOCYTES NFR BLD: 0 %
LYMPHOCYTES # BLD AUTO: 0.6 10E3/UL (ref 0.8–5.3)
LYMPHOCYTES NFR BLD AUTO: 14 %
MCH RBC QN AUTO: 29.9 PG (ref 26.5–33)
MCHC RBC AUTO-ENTMCNC: 32.8 G/DL (ref 31.5–36.5)
MCV RBC AUTO: 91 FL (ref 78–100)
MONOCYTES # BLD AUTO: 0.4 10E3/UL (ref 0–1.3)
MONOCYTES NFR BLD AUTO: 10 %
NEUTROPHILS # BLD AUTO: 3.2 10E3/UL (ref 1.6–8.3)
NEUTROPHILS NFR BLD AUTO: 72 %
NRBC # BLD AUTO: 0 10E3/UL
NRBC BLD AUTO-RTO: 0 /100
PLATELET # BLD AUTO: 185 10E3/UL (ref 150–450)
RBC # BLD AUTO: 2.84 10E6/UL (ref 4.4–5.9)
VANCOMYCIN SERPL-MCNC: 20 UG/ML (ref ?–25)
WBC # BLD AUTO: 4.5 10E3/UL (ref 4–11)

## 2025-07-31 PROCEDURE — 85025 COMPLETE CBC W/AUTO DIFF WBC: CPT

## 2025-07-31 PROCEDURE — 80202 ASSAY OF VANCOMYCIN: CPT

## 2025-07-31 PROCEDURE — 250N000011 HC RX IP 250 OP 636: Performed by: INTERNAL MEDICINE

## 2025-07-31 PROCEDURE — 258N000003 HC RX IP 258 OP 636: Performed by: INTERNAL MEDICINE

## 2025-07-31 PROCEDURE — 84460 ALANINE AMINO (ALT) (SGPT): CPT

## 2025-07-31 PROCEDURE — 258N000003 HC RX IP 258 OP 636: Performed by: PHYSICIAN ASSISTANT

## 2025-07-31 PROCEDURE — 82565 ASSAY OF CREATININE: CPT | Performed by: PHYSICIAN ASSISTANT

## 2025-07-31 PROCEDURE — 36415 COLL VENOUS BLD VENIPUNCTURE: CPT

## 2025-07-31 PROCEDURE — 84450 TRANSFERASE (AST) (SGOT): CPT

## 2025-07-31 RX ORDER — MEPERIDINE HYDROCHLORIDE 25 MG/ML
25 INJECTION INTRAMUSCULAR; INTRAVENOUS; SUBCUTANEOUS
OUTPATIENT
Start: 2025-08-01

## 2025-07-31 RX ORDER — VANCOMYCIN HYDROCHLORIDE 1 G/200ML
1000 INJECTION, SOLUTION INTRAVENOUS EVERY 24 HOURS
Start: 2025-08-01

## 2025-07-31 RX ORDER — METHYLPREDNISOLONE SODIUM SUCCINATE 40 MG/ML
40 INJECTION INTRAMUSCULAR; INTRAVENOUS
Start: 2025-08-01

## 2025-07-31 RX ORDER — HEPARIN SODIUM,PORCINE 10 UNIT/ML
5-20 VIAL (ML) INTRAVENOUS DAILY PRN
OUTPATIENT
Start: 2025-08-01

## 2025-07-31 RX ORDER — ALBUTEROL SULFATE 0.83 MG/ML
2.5 SOLUTION RESPIRATORY (INHALATION)
OUTPATIENT
Start: 2025-08-01

## 2025-07-31 RX ORDER — HEPARIN SODIUM (PORCINE) LOCK FLUSH IV SOLN 100 UNIT/ML 100 UNIT/ML
5 SOLUTION INTRAVENOUS
OUTPATIENT
Start: 2025-08-01

## 2025-07-31 RX ORDER — DIPHENHYDRAMINE HYDROCHLORIDE 50 MG/ML
25 INJECTION, SOLUTION INTRAMUSCULAR; INTRAVENOUS
Start: 2025-08-01

## 2025-07-31 RX ORDER — EPINEPHRINE 1 MG/ML
0.3 INJECTION, SOLUTION, CONCENTRATE INTRAVENOUS EVERY 5 MIN PRN
OUTPATIENT
Start: 2025-08-01

## 2025-07-31 RX ORDER — ALBUTEROL SULFATE 90 UG/1
1-2 INHALANT RESPIRATORY (INHALATION)
Start: 2025-08-01

## 2025-07-31 RX ORDER — DIPHENHYDRAMINE HYDROCHLORIDE 50 MG/ML
50 INJECTION, SOLUTION INTRAMUSCULAR; INTRAVENOUS
Start: 2025-08-01

## 2025-07-31 RX ADMIN — SODIUM CHLORIDE 250 ML: 0.9 INJECTION, SOLUTION INTRAVENOUS at 14:24

## 2025-07-31 RX ADMIN — VANCOMYCIN HYDROCHLORIDE 1250 MG: 1 INJECTION, POWDER, LYOPHILIZED, FOR SOLUTION INTRAVENOUS at 14:30

## 2025-07-31 ASSESSMENT — PAIN SCALES - GENERAL: PAINLEVEL_OUTOF10: NO PAIN (0)

## 2025-07-31 NOTE — PROGRESS NOTES
Infusion Nursing Note:  Duane C Johnson presents today for PICC labs.    Patient seen by provider today: No   present during visit today: Not Applicable.    Note: N/A.      Intravenous Access:  PICC. Labs drawn without difficulty.      Dianna Guevara RN

## 2025-07-31 NOTE — PHARMACY-VANCOMYCIN DOSING SERVICE
Pharmacy Vancomycin Note  Date of Service 2025  Patient's  1950   75 year old, male    Indication: Skin and Soft Tissue Infection  Current vancomycin regimen:  1250 mg IV q24h  Current vancomycin monitoring method: AUC  Current vancomycin therapeutic monitoring goal: 400-600 mg*h/L    [employer] Prediction of Current Vancomycin Regimen  Regimen: 1250 mg IV every 24 hours.  Start time: 14:30 on 2025  Exposure target: AUC24 (range) 400-600 mg/L.hr   AUC24,ss: 603 mg/L.hr  Probability of AUC24 > 400: >95 %  Ctrough,ss: 19.2 mg/L  Probability of Ctrough,ss > 20: 34 %  Probability of nephrotoxicity (Lodise KARLA ): 16%      Current estimated CrCl = Estimated Creatinine Clearance: 48.7 mL/min (A) (based on SCr of 1.44 mg/dL (H)).    Creatinine for last 3 days  2025:  2:12 PM Creatinine 1.44 mg/dL    Recent Vancomycin Levels (past 3 days)  2025:  2:12 PM Vancomycin 20.0 ug/mL    Vancomycin IV Administrations (past 72 hours)                     vancomycin (VANCOCIN) 1,250 mg in 0.9% NaCl 250 mL intermittent infusion (mg) 1,250 mg New Bag 25 1430    vancomycin (VANCOCIN) 1,250 mg in 0.9% NaCl 250 mL intermittent infusion (mg) 1,250 mg New Bag 25 1448    vancomycin (VANCOCIN) 1,250 mg in 0.9% NaCl 250 mL intermittent infusion (mg) 1,250 mg New Bag 25 1452                    Nephrotoxins and other renal medications (From now, onward)      Start     Dose/Rate Route Frequency Ordered Stop    25 1430  vancomycin (VANCOCIN) 1,250 mg in 0.9% NaCl 250 mL intermittent infusion        Note to Pharmacy: Phamacist o adjust- AUC target is 400-600    1,250 mg  over 90 Minutes Intravenous ONCE 25 1413                 Contrast Orders - past 72 hours (72h ago, onward)      None            Interpretation of levels and current regimen:  Vancomycin level is reflective of AUC greater than 600    Has serum creatinine changed greater than 50% in last 72 hours: Yes    Urine output:   unable to determine    Renal Function: Worsening    [employer] Prediction of Planned New Vancomycin Regimen  Regimen: 1000 mg IV every 24 hours.  Start time: 14:30 on 08/01/2025  Exposure target: AUC24 (range) 400-600 mg/L.hr   AUC24,ss: 486 mg/L.hr  Probability of AUC24 > 400: >95 %  Ctrough,ss: 15.4 mg/L  Probability of Ctrough,ss > 20: %  Probability of nephrotoxicity (Lodise KARLA 2009): 11 %    Plan:  Decrease Dose to 1000 mg IV Q24H  Vancomycin monitoring method: AUC  Vancomycin therapeutic monitoring goal: 400-600 mg*h/L  Pharmacy will check vancomycin levels as appropriate in 1-3 Days.  Serum creatinine levels will be ordered daily for the first week of therapy and at least twice weekly for subsequent weeks.    Maxwell Samayoa [facility]

## 2025-07-31 NOTE — PROGRESS NOTES
Infusion Nursing Note:  Duane CYRUS Aguila presents today for PICC lab draws and IV Vancomycin.    Patient seen by provider today: No   present during visit today: Not Applicable.    Note: N/A.      Intravenous Access:  PICC.    Treatment Conditions:  Not Applicable.      Post Infusion Assessment:  Patient tolerated infusion without incident.       Discharge Plan:   Patient discharged in stable condition accompanied by: wife.  Departure Mode: Wheelchair.      Dianna Guevara RN

## 2025-07-31 NOTE — TELEPHONE ENCOUNTER
Follow up call to [facility] regarding labs and Vanco level from today.      Vanco dose will be decreased to 1000 mg on 8/1 based on the level from 7/31 of 20.0  Next level to be checked 8/4.    All other ID labs drawn today.    This RN will update Dr. Sarah.    Scarlet Lemus, BSN, RN  RN Care Coordinator [facility]

## 2025-08-02 ENCOUNTER — INFUSION THERAPY VISIT (OUTPATIENT)
Dept: INFUSION THERAPY | Facility: CLINIC | Age: 75
End: 2025-08-02
Attending: PHYSICIAN ASSISTANT
Payer: MEDICARE

## 2025-08-02 VITALS — OXYGEN SATURATION: 99 %

## 2025-08-02 DIAGNOSIS — B94.8 SEQUELA OF CORYNEBACTERIUM INFECTION: Primary | ICD-10-CM

## 2025-08-02 PROCEDURE — 250N000011 HC RX IP 250 OP 636: Performed by: PHYSICIAN ASSISTANT

## 2025-08-02 PROCEDURE — 96365 THER/PROPH/DIAG IV INF INIT: CPT

## 2025-08-02 PROCEDURE — 258N000003 HC RX IP 258 OP 636: Performed by: PHYSICIAN ASSISTANT

## 2025-08-02 RX ORDER — HEPARIN SODIUM (PORCINE) LOCK FLUSH IV SOLN 100 UNIT/ML 100 UNIT/ML
5 SOLUTION INTRAVENOUS
Status: CANCELLED | OUTPATIENT
Start: 2025-08-03

## 2025-08-02 RX ORDER — MEPERIDINE HYDROCHLORIDE 25 MG/ML
25 INJECTION INTRAMUSCULAR; INTRAVENOUS; SUBCUTANEOUS
Status: CANCELLED | OUTPATIENT
Start: 2025-08-03

## 2025-08-02 RX ORDER — HEPARIN SODIUM,PORCINE 10 UNIT/ML
5-20 VIAL (ML) INTRAVENOUS DAILY PRN
Status: CANCELLED | OUTPATIENT
Start: 2025-08-03

## 2025-08-02 RX ORDER — DIPHENHYDRAMINE HYDROCHLORIDE 50 MG/ML
25 INJECTION, SOLUTION INTRAMUSCULAR; INTRAVENOUS
Status: CANCELLED
Start: 2025-08-03

## 2025-08-02 RX ORDER — DIPHENHYDRAMINE HYDROCHLORIDE 50 MG/ML
50 INJECTION, SOLUTION INTRAMUSCULAR; INTRAVENOUS
Status: CANCELLED
Start: 2025-08-03

## 2025-08-02 RX ORDER — METHYLPREDNISOLONE SODIUM SUCCINATE 40 MG/ML
40 INJECTION INTRAMUSCULAR; INTRAVENOUS
Status: CANCELLED
Start: 2025-08-03

## 2025-08-02 RX ORDER — ALBUTEROL SULFATE 0.83 MG/ML
2.5 SOLUTION RESPIRATORY (INHALATION)
Status: CANCELLED | OUTPATIENT
Start: 2025-08-03

## 2025-08-02 RX ORDER — ALBUTEROL SULFATE 90 UG/1
1-2 INHALANT RESPIRATORY (INHALATION)
Status: CANCELLED
Start: 2025-08-03

## 2025-08-02 RX ORDER — EPINEPHRINE 1 MG/ML
0.3 INJECTION, SOLUTION INTRAMUSCULAR; SUBCUTANEOUS EVERY 5 MIN PRN
Status: CANCELLED | OUTPATIENT
Start: 2025-08-03

## 2025-08-02 RX ADMIN — VANCOMYCIN HYDROCHLORIDE 1000 MG: 1 INJECTION, POWDER, LYOPHILIZED, FOR SOLUTION INTRAVENOUS at 11:18

## 2025-08-03 ENCOUNTER — INFUSION THERAPY VISIT (OUTPATIENT)
Dept: INFUSION THERAPY | Facility: CLINIC | Age: 75
End: 2025-08-03
Attending: PHYSICIAN ASSISTANT
Payer: MEDICARE

## 2025-08-03 VITALS — TEMPERATURE: 97.7 F | RESPIRATION RATE: 18 BRPM | HEART RATE: 57 BPM | OXYGEN SATURATION: 99 %

## 2025-08-03 DIAGNOSIS — B94.8 SEQUELA OF CORYNEBACTERIUM INFECTION: Primary | ICD-10-CM

## 2025-08-03 PROCEDURE — 258N000003 HC RX IP 258 OP 636: Performed by: PHYSICIAN ASSISTANT

## 2025-08-03 PROCEDURE — 250N000011 HC RX IP 250 OP 636: Performed by: PHYSICIAN ASSISTANT

## 2025-08-03 PROCEDURE — 96365 THER/PROPH/DIAG IV INF INIT: CPT

## 2025-08-03 RX ORDER — ALBUTEROL SULFATE 0.83 MG/ML
2.5 SOLUTION RESPIRATORY (INHALATION)
Status: CANCELLED | OUTPATIENT
Start: 2025-08-04

## 2025-08-03 RX ORDER — DIPHENHYDRAMINE HYDROCHLORIDE 50 MG/ML
50 INJECTION, SOLUTION INTRAMUSCULAR; INTRAVENOUS
Status: CANCELLED
Start: 2025-08-04

## 2025-08-03 RX ORDER — HEPARIN SODIUM,PORCINE 10 UNIT/ML
5-20 VIAL (ML) INTRAVENOUS DAILY PRN
Status: CANCELLED | OUTPATIENT
Start: 2025-08-04

## 2025-08-03 RX ORDER — EPINEPHRINE 1 MG/ML
0.3 INJECTION, SOLUTION INTRAMUSCULAR; SUBCUTANEOUS EVERY 5 MIN PRN
Status: CANCELLED | OUTPATIENT
Start: 2025-08-04

## 2025-08-03 RX ORDER — HEPARIN SODIUM (PORCINE) LOCK FLUSH IV SOLN 100 UNIT/ML 100 UNIT/ML
5 SOLUTION INTRAVENOUS
Status: CANCELLED | OUTPATIENT
Start: 2025-08-04

## 2025-08-03 RX ORDER — MEPERIDINE HYDROCHLORIDE 25 MG/ML
25 INJECTION INTRAMUSCULAR; INTRAVENOUS; SUBCUTANEOUS
Status: CANCELLED | OUTPATIENT
Start: 2025-08-04

## 2025-08-03 RX ORDER — ALBUTEROL SULFATE 90 UG/1
1-2 INHALANT RESPIRATORY (INHALATION)
Status: CANCELLED
Start: 2025-08-04

## 2025-08-03 RX ORDER — HEPARIN SODIUM,PORCINE 10 UNIT/ML
5-20 VIAL (ML) INTRAVENOUS DAILY PRN
Status: DISCONTINUED | OUTPATIENT
Start: 2025-08-03 | End: 2025-08-03 | Stop reason: HOSPADM

## 2025-08-03 RX ORDER — DIPHENHYDRAMINE HYDROCHLORIDE 50 MG/ML
25 INJECTION, SOLUTION INTRAMUSCULAR; INTRAVENOUS
Status: CANCELLED
Start: 2025-08-04

## 2025-08-03 RX ORDER — METHYLPREDNISOLONE SODIUM SUCCINATE 40 MG/ML
40 INJECTION INTRAMUSCULAR; INTRAVENOUS
Status: CANCELLED
Start: 2025-08-04

## 2025-08-03 RX ADMIN — VANCOMYCIN HYDROCHLORIDE 1000 MG: 1 INJECTION, POWDER, LYOPHILIZED, FOR SOLUTION INTRAVENOUS at 11:23

## 2025-08-03 RX ADMIN — Medication 5 ML: at 12:48

## 2025-08-04 ENCOUNTER — PRE VISIT (OUTPATIENT)
Dept: INFECTIOUS DISEASES | Facility: CLINIC | Age: 75
End: 2025-08-04
Payer: MEDICARE

## 2025-08-04 ENCOUNTER — ENROLLMENT (OUTPATIENT)
Dept: HOME HEALTH SERVICES | Facility: HOME HEALTH | Age: 75
End: 2025-08-04
Payer: MEDICARE

## 2025-08-04 ENCOUNTER — TELEPHONE (OUTPATIENT)
Dept: INFECTIOUS DISEASES | Facility: CLINIC | Age: 75
End: 2025-08-04

## 2025-08-04 ENCOUNTER — OFFICE VISIT (OUTPATIENT)
Dept: INFECTIOUS DISEASES | Facility: CLINIC | Age: 75
End: 2025-08-04
Attending: PHYSICIAN ASSISTANT
Payer: MEDICARE

## 2025-08-04 VITALS — OXYGEN SATURATION: 97 % | WEIGHT: 170 LBS | HEART RATE: 64 BPM | TEMPERATURE: 97.7 F | BODY MASS INDEX: 26.63 KG/M2

## 2025-08-04 DIAGNOSIS — I50.22 CHRONIC SYSTOLIC CONGESTIVE HEART FAILURE (H): ICD-10-CM

## 2025-08-04 DIAGNOSIS — R79.89 ELEVATED SERUM CREATININE: ICD-10-CM

## 2025-08-04 DIAGNOSIS — B94.8 SEQUELA OF CORYNEBACTERIUM INFECTION: ICD-10-CM

## 2025-08-04 DIAGNOSIS — T82.7XXA INFECTION ASSOCIATED WITH DRIVELINE OF LEFT VENTRICULAR ASSIST DEVICE (LVAD): Primary | ICD-10-CM

## 2025-08-04 DIAGNOSIS — Z95.811 HISTORY OF LEFT VENTRICULAR ASSIST DEVICE (LVAD) (H): ICD-10-CM

## 2025-08-04 PROCEDURE — 99417 PROLNG OP E/M EACH 15 MIN: CPT | Performed by: STUDENT IN AN ORGANIZED HEALTH CARE EDUCATION/TRAINING PROGRAM

## 2025-08-04 PROCEDURE — G0463 HOSPITAL OUTPT CLINIC VISIT: HCPCS | Performed by: STUDENT IN AN ORGANIZED HEALTH CARE EDUCATION/TRAINING PROGRAM

## 2025-08-04 PROCEDURE — 1126F AMNT PAIN NOTED NONE PRSNT: CPT | Performed by: STUDENT IN AN ORGANIZED HEALTH CARE EDUCATION/TRAINING PROGRAM

## 2025-08-04 PROCEDURE — 99215 OFFICE O/P EST HI 40 MIN: CPT | Performed by: STUDENT IN AN ORGANIZED HEALTH CARE EDUCATION/TRAINING PROGRAM

## 2025-08-04 RX ORDER — HEPARIN SODIUM (PORCINE) LOCK FLUSH IV SOLN 100 UNIT/ML 100 UNIT/ML
5 SOLUTION INTRAVENOUS
OUTPATIENT
Start: 2025-08-04

## 2025-08-04 RX ORDER — HEPARIN SODIUM,PORCINE 10 UNIT/ML
5-20 VIAL (ML) INTRAVENOUS DAILY PRN
Status: CANCELLED | OUTPATIENT
Start: 2025-08-04

## 2025-08-04 ASSESSMENT — PAIN SCALES - GENERAL: PAINLEVEL_OUTOF10: NO PAIN (0)

## 2025-08-05 ENCOUNTER — HOME INFUSION (OUTPATIENT)
Dept: HOME HEALTH SERVICES | Facility: HOME HEALTH | Age: 75
End: 2025-08-05
Payer: MEDICARE

## 2025-08-05 ENCOUNTER — HOME INFUSION BILLING (OUTPATIENT)
Dept: HOME HEALTH SERVICES | Facility: HOME HEALTH | Age: 75
End: 2025-08-05
Payer: MEDICARE

## 2025-08-05 ENCOUNTER — INFUSION THERAPY VISIT (OUTPATIENT)
Dept: INFUSION THERAPY | Facility: CLINIC | Age: 75
End: 2025-08-05
Attending: FAMILY MEDICINE
Payer: MEDICARE

## 2025-08-05 VITALS
HEART RATE: 54 BPM | RESPIRATION RATE: 15 BRPM | DIASTOLIC BLOOD PRESSURE: 53 MMHG | SYSTOLIC BLOOD PRESSURE: 69 MMHG | OXYGEN SATURATION: 100 % | TEMPERATURE: 97.7 F

## 2025-08-05 DIAGNOSIS — Z95.811 HISTORY OF LEFT VENTRICULAR ASSIST DEVICE (LVAD) (H): ICD-10-CM

## 2025-08-05 DIAGNOSIS — R53.81 PHYSICAL DECONDITIONING: Primary | ICD-10-CM

## 2025-08-05 DIAGNOSIS — B94.8 SEQUELA OF CORYNEBACTERIUM INFECTION: Primary | ICD-10-CM

## 2025-08-05 PROCEDURE — 250N000011 HC RX IP 250 OP 636: Performed by: STUDENT IN AN ORGANIZED HEALTH CARE EDUCATION/TRAINING PROGRAM

## 2025-08-05 RX ORDER — HEPARIN SODIUM (PORCINE) LOCK FLUSH IV SOLN 100 UNIT/ML 100 UNIT/ML
5 SOLUTION INTRAVENOUS
Status: CANCELLED | OUTPATIENT
Start: 2025-08-10

## 2025-08-05 RX ORDER — HEPARIN SODIUM,PORCINE 10 UNIT/ML
5-20 VIAL (ML) INTRAVENOUS DAILY PRN
Status: DISPENSED | OUTPATIENT
Start: 2025-08-05

## 2025-08-05 RX ORDER — HEPARIN SODIUM,PORCINE 10 UNIT/ML
5-20 VIAL (ML) INTRAVENOUS DAILY PRN
Status: CANCELLED | OUTPATIENT
Start: 2025-08-10

## 2025-08-05 RX ADMIN — HEPARIN, PORCINE (PF) 10 UNIT/ML INTRAVENOUS SYRINGE 10 ML: at 13:29

## 2025-08-06 ENCOUNTER — VIRTUAL VISIT (OUTPATIENT)
Dept: RADIATION THERAPY | Facility: OUTPATIENT CENTER | Age: 75
End: 2025-08-06
Payer: MEDICARE

## 2025-08-06 ENCOUNTER — ANTICOAGULATION THERAPY VISIT (OUTPATIENT)
Dept: ANTICOAGULATION | Facility: CLINIC | Age: 75
End: 2025-08-06
Payer: MEDICARE

## 2025-08-06 ENCOUNTER — HOME CARE VISIT (OUTPATIENT)
Dept: HOME HEALTH SERVICES | Facility: HOME HEALTH | Age: 75
End: 2025-08-06
Payer: MEDICARE

## 2025-08-06 ENCOUNTER — CARE COORDINATION (OUTPATIENT)
Dept: CARDIOLOGY | Facility: CLINIC | Age: 75
End: 2025-08-06
Payer: MEDICARE

## 2025-08-06 VITALS
RESPIRATION RATE: 16 BRPM | OXYGEN SATURATION: 95 % | SYSTOLIC BLOOD PRESSURE: 72 MMHG | HEART RATE: 70 BPM | DIASTOLIC BLOOD PRESSURE: 55 MMHG | TEMPERATURE: 97.1 F

## 2025-08-06 DIAGNOSIS — I95.89 OTHER SPECIFIED HYPOTENSION: ICD-10-CM

## 2025-08-06 DIAGNOSIS — I50.42 CHRONIC COMBINED SYSTOLIC AND DIASTOLIC HEART FAILURE (H): ICD-10-CM

## 2025-08-06 DIAGNOSIS — Z95.811 LVAD (LEFT VENTRICULAR ASSIST DEVICE) PRESENT (H): ICD-10-CM

## 2025-08-06 DIAGNOSIS — I50.22 CHRONIC SYSTOLIC CONGESTIVE HEART FAILURE (H): Primary | ICD-10-CM

## 2025-08-06 DIAGNOSIS — C61 PROSTATE CANCER (H): Primary | ICD-10-CM

## 2025-08-06 DIAGNOSIS — Z79.01 ANTICOAGULATED ON WARFARIN: ICD-10-CM

## 2025-08-06 LAB — INR HOME MONITORING: 1.4 RATIO (ref 2–3)

## 2025-08-06 PROCEDURE — 99601 HOME NFS VISIT <2 HRS: CPT

## 2025-08-11 ENCOUNTER — INFUSION THERAPY VISIT (OUTPATIENT)
Dept: INFUSION THERAPY | Facility: CLINIC | Age: 75
End: 2025-08-11
Attending: STUDENT IN AN ORGANIZED HEALTH CARE EDUCATION/TRAINING PROGRAM
Payer: MEDICARE

## 2025-08-11 ENCOUNTER — VIRTUAL VISIT (OUTPATIENT)
Facility: CLINIC | Age: 75
End: 2025-08-11
Attending: INTERNAL MEDICINE
Payer: MEDICARE

## 2025-08-11 ENCOUNTER — ANTICOAGULATION THERAPY VISIT (OUTPATIENT)
Dept: ANTICOAGULATION | Facility: CLINIC | Age: 75
End: 2025-08-11

## 2025-08-11 VITALS
DIASTOLIC BLOOD PRESSURE: 68 MMHG | HEART RATE: 64 BPM | RESPIRATION RATE: 16 BRPM | OXYGEN SATURATION: 94 % | SYSTOLIC BLOOD PRESSURE: 92 MMHG | TEMPERATURE: 97.8 F

## 2025-08-11 DIAGNOSIS — Z79.01 ANTICOAGULATED ON WARFARIN: ICD-10-CM

## 2025-08-11 DIAGNOSIS — I50.22 CHRONIC SYSTOLIC CONGESTIVE HEART FAILURE (H): Primary | ICD-10-CM

## 2025-08-11 DIAGNOSIS — Z95.811 LVAD (LEFT VENTRICULAR ASSIST DEVICE) PRESENT (H): ICD-10-CM

## 2025-08-11 DIAGNOSIS — I50.22 CHRONIC SYSTOLIC CONGESTIVE HEART FAILURE (H): ICD-10-CM

## 2025-08-11 DIAGNOSIS — I95.89 OTHER SPECIFIED HYPOTENSION: ICD-10-CM

## 2025-08-11 DIAGNOSIS — I25.2 HISTORY OF ST ELEVATION MYOCARDIAL INFARCTION (STEMI): ICD-10-CM

## 2025-08-11 DIAGNOSIS — B94.8 SEQUELA OF CORYNEBACTERIUM INFECTION: Primary | ICD-10-CM

## 2025-08-11 DIAGNOSIS — Z09 HOSPITAL DISCHARGE FOLLOW-UP: ICD-10-CM

## 2025-08-11 DIAGNOSIS — D64.9 ANEMIA, UNSPECIFIED TYPE: ICD-10-CM

## 2025-08-11 DIAGNOSIS — I48.91 ATRIAL FIBRILLATION, UNSPECIFIED TYPE (H): ICD-10-CM

## 2025-08-11 DIAGNOSIS — Z95.811 HISTORY OF LEFT VENTRICULAR ASSIST DEVICE (LVAD) (H): ICD-10-CM

## 2025-08-11 DIAGNOSIS — Z78.9 TAKES DIETARY SUPPLEMENTS: ICD-10-CM

## 2025-08-11 DIAGNOSIS — K59.00 CONSTIPATION: ICD-10-CM

## 2025-08-11 DIAGNOSIS — I50.42 CHRONIC COMBINED SYSTOLIC AND DIASTOLIC HEART FAILURE (H): ICD-10-CM

## 2025-08-11 DIAGNOSIS — F41.9 ANXIETY: ICD-10-CM

## 2025-08-11 LAB
CREAT SERPL-MCNC: 1.28 MG/DL (ref 0.67–1.17)
EGFRCR SERPLBLD CKD-EPI 2021: 58 ML/MIN/1.73M2
INR PPP: 2.41 (ref 0.85–1.15)
PROTHROMBIN TIME: 25.5 SECONDS (ref 11.8–14.8)

## 2025-08-11 PROCEDURE — 82565 ASSAY OF CREATININE: CPT

## 2025-08-11 PROCEDURE — 258N000003 HC RX IP 258 OP 636: Performed by: PHYSICIAN ASSISTANT

## 2025-08-11 PROCEDURE — 85610 PROTHROMBIN TIME: CPT

## 2025-08-11 PROCEDURE — 250N000011 HC RX IP 250 OP 636: Mod: JZ | Performed by: PHYSICIAN ASSISTANT

## 2025-08-11 PROCEDURE — 96365 THER/PROPH/DIAG IV INF INIT: CPT

## 2025-08-11 PROCEDURE — 36592 COLLECT BLOOD FROM PICC: CPT

## 2025-08-11 RX ORDER — DIPHENHYDRAMINE HYDROCHLORIDE 50 MG/ML
25 INJECTION, SOLUTION INTRAMUSCULAR; INTRAVENOUS
Start: 2025-08-18

## 2025-08-11 RX ORDER — ALBUTEROL SULFATE 90 UG/1
1-2 INHALANT RESPIRATORY (INHALATION)
Start: 2025-08-18

## 2025-08-11 RX ORDER — MEPERIDINE HYDROCHLORIDE 25 MG/ML
25 INJECTION INTRAMUSCULAR; INTRAVENOUS; SUBCUTANEOUS
OUTPATIENT
Start: 2025-08-18

## 2025-08-11 RX ORDER — METHYLPREDNISOLONE SODIUM SUCCINATE 40 MG/ML
40 INJECTION INTRAMUSCULAR; INTRAVENOUS
Start: 2025-08-18

## 2025-08-11 RX ORDER — HEPARIN SODIUM (PORCINE) LOCK FLUSH IV SOLN 100 UNIT/ML 100 UNIT/ML
5 SOLUTION INTRAVENOUS
OUTPATIENT
Start: 2025-08-12

## 2025-08-11 RX ORDER — DIPHENHYDRAMINE HYDROCHLORIDE 50 MG/ML
50 INJECTION, SOLUTION INTRAMUSCULAR; INTRAVENOUS
Start: 2025-08-18

## 2025-08-11 RX ORDER — ALBUTEROL SULFATE 0.83 MG/ML
2.5 SOLUTION RESPIRATORY (INHALATION)
OUTPATIENT
Start: 2025-08-18

## 2025-08-11 RX ORDER — EPINEPHRINE 1 MG/ML
0.3 INJECTION, SOLUTION, CONCENTRATE INTRAVENOUS EVERY 5 MIN PRN
OUTPATIENT
Start: 2025-08-18

## 2025-08-11 RX ORDER — HEPARIN SODIUM,PORCINE 10 UNIT/ML
5-20 VIAL (ML) INTRAVENOUS DAILY PRN
OUTPATIENT
Start: 2025-08-12

## 2025-08-11 RX ADMIN — FERRIC CARBOXYMALTOSE INJECTION 750 MG: 50 INJECTION, SOLUTION INTRAVENOUS at 14:03

## 2025-08-11 RX ADMIN — SODIUM CHLORIDE 250 ML: 0.9 INJECTION, SOLUTION INTRAVENOUS at 13:58

## 2025-08-11 ASSESSMENT — PAIN SCALES - GENERAL: PAINLEVEL_OUTOF10: NO PAIN (0)

## 2025-08-18 ENCOUNTER — VIRTUAL VISIT (OUTPATIENT)
Dept: INFECTIOUS DISEASES | Facility: CLINIC | Age: 75
End: 2025-08-18
Attending: STUDENT IN AN ORGANIZED HEALTH CARE EDUCATION/TRAINING PROGRAM
Payer: MEDICARE

## 2025-08-18 ENCOUNTER — TELEPHONE (OUTPATIENT)
Dept: INFECTIOUS DISEASES | Facility: CLINIC | Age: 75
End: 2025-08-18

## 2025-08-18 ENCOUNTER — OFFICE VISIT (OUTPATIENT)
Dept: SURGERY | Facility: CLINIC | Age: 75
End: 2025-08-18
Payer: MEDICARE

## 2025-08-18 VITALS — WEIGHT: 173.6 LBS | HEIGHT: 67 IN | OXYGEN SATURATION: 92 % | HEART RATE: 55 BPM | BODY MASS INDEX: 27.25 KG/M2

## 2025-08-18 DIAGNOSIS — K62.7 RADIATION PROCTITIS: Primary | ICD-10-CM

## 2025-08-18 DIAGNOSIS — R79.89 ELEVATED SERUM CREATININE: ICD-10-CM

## 2025-08-18 DIAGNOSIS — Z23 NEED FOR VACCINATION: ICD-10-CM

## 2025-08-18 DIAGNOSIS — K62.5 RECTAL BLEEDING: ICD-10-CM

## 2025-08-18 DIAGNOSIS — B94.8 SEQUELA OF CORYNEBACTERIUM INFECTION: ICD-10-CM

## 2025-08-18 DIAGNOSIS — T82.7XXA INFECTION ASSOCIATED WITH DRIVELINE OF LEFT VENTRICULAR ASSIST DEVICE (LVAD): Primary | ICD-10-CM

## 2025-08-18 PROCEDURE — 45300 PROCTOSIGMOIDOSCOPY DX: CPT | Performed by: NURSE PRACTITIONER

## 2025-08-18 PROCEDURE — 1126F AMNT PAIN NOTED NONE PRSNT: CPT | Performed by: STUDENT IN AN ORGANIZED HEALTH CARE EDUCATION/TRAINING PROGRAM

## 2025-08-18 PROCEDURE — 98007 SYNCH AUDIO-VIDEO EST HI 40: CPT | Performed by: STUDENT IN AN ORGANIZED HEALTH CARE EDUCATION/TRAINING PROGRAM

## 2025-08-18 PROCEDURE — 99213 OFFICE O/P EST LOW 20 MIN: CPT | Mod: 25 | Performed by: NURSE PRACTITIONER

## 2025-08-18 PROCEDURE — 1126F AMNT PAIN NOTED NONE PRSNT: CPT | Performed by: NURSE PRACTITIONER

## 2025-08-18 ASSESSMENT — PAIN SCALES - GENERAL
PAINLEVEL_OUTOF10: NO PAIN (0)
PAINLEVEL_OUTOF10: NO PAIN (0)

## 2025-08-19 ENCOUNTER — INFUSION THERAPY VISIT (OUTPATIENT)
Dept: INFUSION THERAPY | Facility: CLINIC | Age: 75
End: 2025-08-19
Attending: FAMILY MEDICINE
Payer: MEDICARE

## 2025-08-19 ENCOUNTER — ANTICOAGULATION THERAPY VISIT (OUTPATIENT)
Dept: ANTICOAGULATION | Facility: CLINIC | Age: 75
End: 2025-08-19

## 2025-08-19 VITALS
HEIGHT: 67 IN | SYSTOLIC BLOOD PRESSURE: 104 MMHG | TEMPERATURE: 97.8 F | WEIGHT: 174.1 LBS | RESPIRATION RATE: 15 BRPM | OXYGEN SATURATION: 91 % | DIASTOLIC BLOOD PRESSURE: 70 MMHG | BODY MASS INDEX: 27.33 KG/M2 | HEART RATE: 61 BPM

## 2025-08-19 DIAGNOSIS — D64.9 ANEMIA, UNSPECIFIED TYPE: ICD-10-CM

## 2025-08-19 DIAGNOSIS — Z79.01 ANTICOAGULATED ON WARFARIN: ICD-10-CM

## 2025-08-19 DIAGNOSIS — Z95.811 LVAD (LEFT VENTRICULAR ASSIST DEVICE) PRESENT (H): ICD-10-CM

## 2025-08-19 DIAGNOSIS — Z95.811 HISTORY OF LEFT VENTRICULAR ASSIST DEVICE (LVAD) (H): ICD-10-CM

## 2025-08-19 DIAGNOSIS — T82.7XXA INFECTION ASSOCIATED WITH DRIVELINE OF LEFT VENTRICULAR ASSIST DEVICE (LVAD): ICD-10-CM

## 2025-08-19 DIAGNOSIS — I50.42 CHRONIC COMBINED SYSTOLIC AND DIASTOLIC HEART FAILURE (H): ICD-10-CM

## 2025-08-19 DIAGNOSIS — I50.22 CHRONIC SYSTOLIC CONGESTIVE HEART FAILURE (H): Primary | ICD-10-CM

## 2025-08-19 DIAGNOSIS — R79.89 ELEVATED SERUM CREATININE: ICD-10-CM

## 2025-08-19 DIAGNOSIS — B94.8 SEQUELA OF CORYNEBACTERIUM INFECTION: ICD-10-CM

## 2025-08-19 DIAGNOSIS — I95.89 OTHER SPECIFIED HYPOTENSION: ICD-10-CM

## 2025-08-19 DIAGNOSIS — I50.22 CHRONIC SYSTOLIC CONGESTIVE HEART FAILURE (H): ICD-10-CM

## 2025-08-19 LAB
CREAT SERPL-MCNC: 1.15 MG/DL (ref 0.67–1.17)
EGFRCR SERPLBLD CKD-EPI 2021: 66 ML/MIN/1.73M2
INR PPP: 2.16 (ref 0.85–1.15)
PROTHROMBIN TIME: 23.5 SECONDS (ref 11.8–14.8)

## 2025-08-19 PROCEDURE — 82565 ASSAY OF CREATININE: CPT

## 2025-08-19 PROCEDURE — 250N000011 HC RX IP 250 OP 636: Mod: JZ | Performed by: PHYSICIAN ASSISTANT

## 2025-08-19 PROCEDURE — 85610 PROTHROMBIN TIME: CPT

## 2025-08-19 PROCEDURE — 258N000003 HC RX IP 258 OP 636: Performed by: PHYSICIAN ASSISTANT

## 2025-08-19 PROCEDURE — 36592 COLLECT BLOOD FROM PICC: CPT

## 2025-08-19 PROCEDURE — 96365 THER/PROPH/DIAG IV INF INIT: CPT

## 2025-08-19 RX ORDER — DIPHENHYDRAMINE HYDROCHLORIDE 50 MG/ML
25 INJECTION, SOLUTION INTRAMUSCULAR; INTRAVENOUS
Start: 2025-08-25

## 2025-08-19 RX ORDER — DIPHENHYDRAMINE HYDROCHLORIDE 50 MG/ML
50 INJECTION, SOLUTION INTRAMUSCULAR; INTRAVENOUS
Start: 2025-08-25

## 2025-08-19 RX ORDER — ALBUTEROL SULFATE 90 UG/1
1-2 INHALANT RESPIRATORY (INHALATION)
Start: 2025-08-25

## 2025-08-19 RX ORDER — METHYLPREDNISOLONE SODIUM SUCCINATE 40 MG/ML
40 INJECTION INTRAMUSCULAR; INTRAVENOUS
Start: 2025-08-25

## 2025-08-19 RX ORDER — EPINEPHRINE 1 MG/ML
0.3 INJECTION, SOLUTION, CONCENTRATE INTRAVENOUS EVERY 5 MIN PRN
OUTPATIENT
Start: 2025-08-25

## 2025-08-19 RX ORDER — ALBUTEROL SULFATE 0.83 MG/ML
2.5 SOLUTION RESPIRATORY (INHALATION)
OUTPATIENT
Start: 2025-08-25

## 2025-08-19 RX ORDER — MEPERIDINE HYDROCHLORIDE 25 MG/ML
25 INJECTION INTRAMUSCULAR; INTRAVENOUS; SUBCUTANEOUS
OUTPATIENT
Start: 2025-08-25

## 2025-08-19 RX ADMIN — FERRIC CARBOXYMALTOSE INJECTION 750 MG: 50 INJECTION, SOLUTION INTRAVENOUS at 14:13

## 2025-08-19 RX ADMIN — SODIUM CHLORIDE 250 ML: 9 INJECTION, SOLUTION INTRAVENOUS at 14:10

## 2025-08-26 ENCOUNTER — ANTICOAGULATION THERAPY VISIT (OUTPATIENT)
Dept: ANTICOAGULATION | Facility: CLINIC | Age: 75
End: 2025-08-26

## 2025-08-26 ENCOUNTER — INFUSION THERAPY VISIT (OUTPATIENT)
Dept: INFUSION THERAPY | Facility: CLINIC | Age: 75
End: 2025-08-26
Attending: INTERNAL MEDICINE
Payer: MEDICARE

## 2025-08-26 DIAGNOSIS — Z79.01 ANTICOAGULATED ON WARFARIN: ICD-10-CM

## 2025-08-26 DIAGNOSIS — I50.22 CHRONIC SYSTOLIC CONGESTIVE HEART FAILURE (H): ICD-10-CM

## 2025-08-26 DIAGNOSIS — Z95.811 HISTORY OF LEFT VENTRICULAR ASSIST DEVICE (LVAD) (H): ICD-10-CM

## 2025-08-26 DIAGNOSIS — B94.8 SEQUELA OF CORYNEBACTERIUM INFECTION: Primary | ICD-10-CM

## 2025-08-26 DIAGNOSIS — Z95.811 LVAD (LEFT VENTRICULAR ASSIST DEVICE) PRESENT (H): ICD-10-CM

## 2025-08-26 LAB
CREAT SERPL-MCNC: 1.22 MG/DL (ref 0.67–1.17)
EGFRCR SERPLBLD CKD-EPI 2021: 62 ML/MIN/1.73M2
INR HOME MONITORING: 2.2 RATIO (ref 2–3)
INR PPP: 2.2 (ref 0.85–1.15)
PROTHROMBIN TIME: 24.1 SECONDS (ref 11.8–14.8)

## 2025-08-26 PROCEDURE — 36415 COLL VENOUS BLD VENIPUNCTURE: CPT

## 2025-08-26 PROCEDURE — 85610 PROTHROMBIN TIME: CPT

## 2025-08-26 PROCEDURE — 82565 ASSAY OF CREATININE: CPT

## 2025-08-26 RX ORDER — HEPARIN SODIUM (PORCINE) LOCK FLUSH IV SOLN 100 UNIT/ML 100 UNIT/ML
5 SOLUTION INTRAVENOUS
OUTPATIENT
Start: 2025-09-02

## 2025-08-26 RX ORDER — HEPARIN SODIUM,PORCINE 10 UNIT/ML
5-20 VIAL (ML) INTRAVENOUS DAILY PRN
OUTPATIENT
Start: 2025-09-02

## 2025-09-02 ENCOUNTER — INFUSION THERAPY VISIT (OUTPATIENT)
Dept: INFUSION THERAPY | Facility: CLINIC | Age: 75
End: 2025-09-02
Attending: INTERNAL MEDICINE
Payer: MEDICARE

## 2025-09-02 ENCOUNTER — ANTICOAGULATION THERAPY VISIT (OUTPATIENT)
Dept: ANTICOAGULATION | Facility: CLINIC | Age: 75
End: 2025-09-02

## 2025-09-02 DIAGNOSIS — I95.89 OTHER SPECIFIED HYPOTENSION: ICD-10-CM

## 2025-09-02 DIAGNOSIS — Z95.811 LVAD (LEFT VENTRICULAR ASSIST DEVICE) PRESENT (H): ICD-10-CM

## 2025-09-02 DIAGNOSIS — I50.42 CHRONIC COMBINED SYSTOLIC AND DIASTOLIC HEART FAILURE (H): ICD-10-CM

## 2025-09-02 DIAGNOSIS — Z79.01 ANTICOAGULATED ON WARFARIN: ICD-10-CM

## 2025-09-02 DIAGNOSIS — B94.8 SEQUELA OF CORYNEBACTERIUM INFECTION: Primary | ICD-10-CM

## 2025-09-02 DIAGNOSIS — Z95.811 HISTORY OF LEFT VENTRICULAR ASSIST DEVICE (LVAD) (H): ICD-10-CM

## 2025-09-02 DIAGNOSIS — I50.22 CHRONIC SYSTOLIC CONGESTIVE HEART FAILURE (H): Primary | ICD-10-CM

## 2025-09-02 DIAGNOSIS — I50.22 CHRONIC SYSTOLIC CONGESTIVE HEART FAILURE (H): ICD-10-CM

## 2025-09-02 LAB
CREAT SERPL-MCNC: 1.17 MG/DL (ref 0.67–1.17)
EGFRCR SERPLBLD CKD-EPI 2021: 65 ML/MIN/1.73M2
INR HOME MONITORING: 1.7 RATIO (ref 2–3)
INR PPP: 1.75 (ref 0.85–1.15)
PROTHROMBIN TIME: 20 SECONDS (ref 11.8–14.8)

## 2025-09-02 PROCEDURE — 82565 ASSAY OF CREATININE: CPT

## 2025-09-02 PROCEDURE — 36592 COLLECT BLOOD FROM PICC: CPT

## 2025-09-02 PROCEDURE — 85610 PROTHROMBIN TIME: CPT

## 2025-09-02 RX ORDER — HEPARIN SODIUM (PORCINE) LOCK FLUSH IV SOLN 100 UNIT/ML 100 UNIT/ML
5 SOLUTION INTRAVENOUS
OUTPATIENT
Start: 2025-09-09

## 2025-09-02 RX ORDER — HEPARIN SODIUM,PORCINE 10 UNIT/ML
5-20 VIAL (ML) INTRAVENOUS DAILY PRN
OUTPATIENT
Start: 2025-09-09

## 2025-09-04 ENCOUNTER — CARE COORDINATION (OUTPATIENT)
Dept: CARDIOLOGY | Facility: CLINIC | Age: 75
End: 2025-09-04
Payer: MEDICARE

## 2025-09-04 DIAGNOSIS — Z51.81 ENCOUNTER FOR MONITORING DIGOXIN THERAPY: ICD-10-CM

## 2025-09-04 DIAGNOSIS — I50.22 CHRONIC SYSTOLIC CONGESTIVE HEART FAILURE (H): Primary | ICD-10-CM

## 2025-09-04 DIAGNOSIS — Z95.811 LVAD (LEFT VENTRICULAR ASSIST DEVICE) PRESENT (H): ICD-10-CM

## 2025-09-04 DIAGNOSIS — Z79.899 ENCOUNTER FOR MONITORING DIGOXIN THERAPY: ICD-10-CM

## 2025-09-04 DIAGNOSIS — Z79.01 ANTICOAGULATED ON WARFARIN: ICD-10-CM

## (undated) DEVICE — PACK HEART RIGHT CUSTOM SAN32RHF18

## (undated) DEVICE — DRSG GAUZE 4X4" TRAY 6939

## (undated) DEVICE — PATCH SURGICAL EVARREST FIBRIN SEALANT 4X2IN EVT5024

## (undated) DEVICE — MANIFOLD KIT ANGIO AUTOMATED 014613

## (undated) DEVICE — TIES BANDING T50R

## (undated) DEVICE — CATH FOLYSIL 16FR 15ML AA6116

## (undated) DEVICE — PROTECTOR ARM ONE-STEP TRENDELENBURG 40418 (COI)

## (undated) DEVICE — SU VICRYL 2-0 CT-1 27" UND J259H

## (undated) DEVICE — TUBING PRESSURE 30"

## (undated) DEVICE — VALVE HEMOSTASIS GUARDIAN II OD8 FR GUIDEWIRE 8215

## (undated) DEVICE — CATH ANGIO INFINITI 3DRC 4FRX100CM 538476

## (undated) DEVICE — KIT RIGHT HEART CATH 60130719

## (undated) DEVICE — SU ETHIBOND 3-0 BBDA 36" X588H

## (undated) DEVICE — Device

## (undated) DEVICE — GUIDEWIRE VASC 0.014INX180CM RUNTHROUGH 25-1011

## (undated) DEVICE — PREP POVIDONE IODINE SWABSTICKS TRIPLE PACK MDS093902A

## (undated) DEVICE — DRAIN CHEST TUBE 32FR STR 8032

## (undated) DEVICE — PACKING NUGAUZE 1/2" PLAIN 7632

## (undated) DEVICE — TUBING SUCTION DRAINAGE PLEURAL DUAL 8884714200

## (undated) DEVICE — SU PLEDGET SOFT TFE 3/8"X3/26"X1/16" PCP40

## (undated) DEVICE — DRAPE IOBAN INCISE 23X17" 6650EZ

## (undated) DEVICE — CATH ANGIO INFINITI JL4 4FRX100CM 538420

## (undated) DEVICE — LINEN TOWEL PACK X30 5481

## (undated) DEVICE — PREP SKIN SCRUB TRAY 4461A

## (undated) DEVICE — DEFIB PRO-PADZ LVP LQD GEL ADULT 8900-2105-01

## (undated) DEVICE — FILTER BLOOD ULTIPOR  SQ40S

## (undated) DEVICE — SUCTION CATH AIRLIFE TRI-FLO W/CONTROL PORT 14FR  T60C

## (undated) DEVICE — JELLY LUBRICATING SURGILUBE 2OZ TUBE

## (undated) DEVICE — SU VICRYL+ 3-0 FS1 27IN UND VCP442H

## (undated) DEVICE — PREP DYNA-HEX 4% CHG SCRUB 4OZ BOTTLE MDS098710

## (undated) DEVICE — NDL 25GA 1.5" 305838

## (undated) DEVICE — SYR 10ML FINGER CONTROL W/O NDL 309695

## (undated) DEVICE — DRAPE SHEET REV FOLD 3/4 9349

## (undated) DEVICE — NDL ANGIOCATH 14GA 1.25" 4048

## (undated) DEVICE — SURGICEL HEMOSTAT 4X8" 1952

## (undated) DEVICE — SUCTION TIP YANKAUER STR K87

## (undated) DEVICE — ESU HOLSTER PLASTIC DISP E2400

## (undated) DEVICE — INTRO SHEATH 7FRX10CM PINNACLE RSS702

## (undated) DEVICE — SU PLEDGET SOFT TFE 13MMX7MMX1.5MM D7044

## (undated) DEVICE — SU PROLENE 4-0 SHDA 36" 8521H

## (undated) DEVICE — KIT HAND CONTROL ACIST 016795

## (undated) DEVICE — DRSG TELFA 3X8" 1238

## (undated) DEVICE — DRSG TEGADERM 8X12" 1629

## (undated) DEVICE — CATH GUIDING BLUE YELLOW PTFE XB3 6FRX100CM 67005200

## (undated) DEVICE — SU PROLENE 5-0 RB-2DA 30" 8710H

## (undated) DEVICE — COVER NEOPROBE SOFTFLEX 5X96" W/BANDS 20-PC596

## (undated) DEVICE — SYR BIOGLUE PREFILLED 5ML BG3515-5-US

## (undated) DEVICE — SUCTION DRY CHEST DRAIN OASIS 3600-100

## (undated) DEVICE — DRAPE BACK TABLE  44X90" 8377

## (undated) DEVICE — GOWN XLG DISP 9545

## (undated) DEVICE — DRAPE MAYO STAND 23X54 8337

## (undated) DEVICE — GLOVE BIOGEL PI MICRO SZ 7.5 48575

## (undated) DEVICE — PREP POVIDONE-IODINE 7.5% SCRUB 4OZ BOTTLE MDS093945

## (undated) DEVICE — KIT DVC ANGIO IBASIXCOMPAK 13INX20ML 3WAY IN4430

## (undated) DEVICE — ESU GROUND PAD ADULT W/CORD E7507

## (undated) DEVICE — CLIP HORIZON MED BLUE 002200

## (undated) DEVICE — WIPES FOLEY CARE SURESTEP PROVON DFC100

## (undated) DEVICE — TUBING PRESSURE MONITOR M/F CONNECTOR 48" 50P148

## (undated) DEVICE — SU ETHIBOND 2-0 SHDA 30" X563H

## (undated) DEVICE — DRAPE TIBURON CARDIOVASCULAR PERI-GROIN LF 9154

## (undated) DEVICE — ELECTRODE DEFIB CADENCE 22550R

## (undated) DEVICE — DRSG DRAIN 4X4" 7086

## (undated) DEVICE — CATH BALLOON NC EMERGE 2.75X12MM H7493926712270

## (undated) DEVICE — SUCTION MANIFOLD NEPTUNE 2 SYS 4 PORT 0702-020-000

## (undated) DEVICE — DRAPE FLUID WARMING 52"X66" ORS-301

## (undated) DEVICE — 8FR X 13CM SAFESHEATH II PEEL-AWAY HEMOSTASIS INTRODUCER (MFR'D BY PRESSURE PRODUCTS)

## (undated) DEVICE — PACK HEART LEFT CUSTOM

## (undated) DEVICE — CATH BALLOON EMERGE 2.5X20MM H7493918920250

## (undated) DEVICE — TIES CABLE STERILE 8" 17133/30

## (undated) DEVICE — SU PROLENE 6-0 C-1DA 30" 8706H

## (undated) DEVICE — DRSG ABDOMINAL 07 1/2X8" 7197D

## (undated) DEVICE — INTRO SHEATH 6FRX25CM PINNACLE RSS606

## (undated) DEVICE — PREP CHLORAPREP 26ML TINTED HI-LITE ORANGE 930815

## (undated) DEVICE — TUBING PRESSURE TRANSDUCER MALE TO FEMALE LL 72" 50P172

## (undated) DEVICE — PACK ADULT HEART UMMC PV15CG92D

## (undated) DEVICE — GLOVE BIOGEL PI ULTRATOUCH G SZ 7.0 42170

## (undated) DEVICE — AGILITI BK ULTRASOUND

## (undated) DEVICE — KIT MICRO-INTRODUCER STIFFEN 4FR 7274V

## (undated) DEVICE — PACKING IODOFORM STRIP 1/2" 7832

## (undated) DEVICE — DRSG GAUZE 4X4" TRAY

## (undated) DEVICE — CABLE PACING ALLIGATOR CLIP 301-CG

## (undated) DEVICE — ESU ELEC BLADE E-SEP INSULATED NEPTUNE 165MM 0703-165-002

## (undated) DEVICE — CONNECTOR BLAKE DRAIN SGL BCC1

## (undated) DEVICE — 6F SAFE SHEATH II, 13CM ***DUPLICATE, PLEASE TRANSITION TO CAT # 667460-300

## (undated) DEVICE — BLADE CLIPPER SGL USE 9680

## (undated) DEVICE — INTRO GLIDESHEATH SLENDER 6FR 10X45CM 60-1060

## (undated) DEVICE — TAPE MEDIPORE 4"X2YD 2864

## (undated) DEVICE — ESU ELEC BLADE 2.75" COATED/INSULATED E1455

## (undated) DEVICE — BLADE SAW STRK STERNAL 6207-97-101

## (undated) DEVICE — RX SURGIFLO HEMOSTATIC MATRIX W/THROMBIN 8ML 2994

## (undated) DEVICE — SU ETHIBOND 2-0 MHDA 36" X843H

## (undated) DEVICE — SU PROLENE 3-0 SHDA 36" 8522H

## (undated) DEVICE — PREP POVIDONE-IODINE 10% SOLUTION 4OZ BOTTLE MDS093944

## (undated) DEVICE — WIRE GUIDE HI-TRQ PILOT 50 JTIP 0.014"X190CM 1010480-HJ

## (undated) DEVICE — ESU ELEC BLADE E-SEP INSULATED NEPTUNE 70MM 0703-070-002

## (undated) DEVICE — LINEN TOWEL PACK X6 WHITE 5487

## (undated) DEVICE — BASIN SET SINGLE STERILE 13752-624

## (undated) DEVICE — SLEEVE TR BAND RADIAL COMPRESSION DEVICE 24CM TRB24-REG

## (undated) DEVICE — TUBING INSUFFLATION PNEUMOCLEAR 0620050100

## (undated) DEVICE — DEVICE TISSUE STABILIZATION OCTOBASE 28707

## (undated) DEVICE — SYR 10ML PREFILLED 0.9% NACL INJ NOT STERILE 306500

## (undated) DEVICE — SU ETHIBOND 0 CT-1 CR 8X18" CX21D

## (undated) DEVICE — ENDO SNARE EXACTO COLD 9MM LOOP 2.4MMX230CM 00711115

## (undated) DEVICE — PUNCH AORTIC 4.0MMX8" RCB40

## (undated) DEVICE — SU SILK 0 TIE 6X30" A306H

## (undated) DEVICE — GW VASC .035IN DIA 260CML 7CML 3 MM RADIUS J CURVE 502455

## (undated) DEVICE — SU VICRYL 0 CTX 36" J370H

## (undated) DEVICE — LINEN GOWN XLG 5407

## (undated) DEVICE — KIT MICROINTRODUCER VASCULAR VSI 4FRX0.018INX40CM 7195X

## (undated) DEVICE — SU STEEL 6 CCS 4X18" M654G

## (undated) DEVICE — CATH BALLOON EMERGE 2.5X8MM H7493918908250

## (undated) DEVICE — SOL NACL 0.9% 10ML VIAL 0409-4888-02

## (undated) DEVICE — SU DERMABOND ADVANCED .7ML DNX12

## (undated) DEVICE — DRAIN CHEST TUBE RIGHT ANGLED 28FR 8128

## (undated) DEVICE — SU PROLENE 4-0 RB-1DA 36" 8557H

## (undated) DEVICE — ESU PENCIL SMOKE EVAC W/ROCKER SWITCH 0703-047-000

## (undated) DEVICE — INTRO SHEATH AVANTI 4FRX23CM 504604T

## (undated) DEVICE — PITCHER STERILE 1000ML  SSK9004A

## (undated) DEVICE — GOWN LG DISP 9515

## (undated) DEVICE — CLIP HORIZON SM RED WIDE SLOT 001201

## (undated) RX ORDER — FUROSEMIDE 10 MG/ML
INJECTION INTRAMUSCULAR; INTRAVENOUS
Status: DISPENSED
Start: 2023-10-26

## (undated) RX ORDER — CEFAZOLIN SODIUM 1 G/3ML
INJECTION, POWDER, FOR SOLUTION INTRAMUSCULAR; INTRAVENOUS
Status: DISPENSED
Start: 2024-05-08

## (undated) RX ORDER — LIDOCAINE HYDROCHLORIDE 20 MG/ML
SOLUTION OROPHARYNGEAL
Status: DISPENSED
Start: 2024-09-30

## (undated) RX ORDER — NICARDIPINE HCL-0.9% SOD CHLOR 1 MG/10 ML
SYRINGE (ML) INTRAVENOUS
Status: DISPENSED
Start: 2023-10-26

## (undated) RX ORDER — ONDANSETRON 2 MG/ML
INJECTION INTRAMUSCULAR; INTRAVENOUS
Status: DISPENSED
Start: 2024-09-18

## (undated) RX ORDER — LIDOCAINE HYDROCHLORIDE 10 MG/ML
INJECTION, SOLUTION EPIDURAL; INFILTRATION; INTRACAUDAL; PERINEURAL
Status: DISPENSED
Start: 2024-04-22

## (undated) RX ORDER — LIDOCAINE HYDROCHLORIDE 10 MG/ML
INJECTION, SOLUTION EPIDURAL; INFILTRATION; INTRACAUDAL; PERINEURAL
Status: DISPENSED
Start: 2023-09-12

## (undated) RX ORDER — ETOMIDATE 2 MG/ML
INJECTION INTRAVENOUS
Status: DISPENSED
Start: 2024-09-18

## (undated) RX ORDER — FENTANYL CITRATE 50 UG/ML
INJECTION, SOLUTION INTRAMUSCULAR; INTRAVENOUS
Status: DISPENSED
Start: 2023-10-26

## (undated) RX ORDER — CEFAZOLIN SODIUM 1 G/3ML
INJECTION, POWDER, FOR SOLUTION INTRAMUSCULAR; INTRAVENOUS
Status: DISPENSED
Start: 2024-09-18

## (undated) RX ORDER — FENTANYL CITRATE 50 UG/ML
INJECTION, SOLUTION INTRAMUSCULAR; INTRAVENOUS
Status: DISPENSED
Start: 2024-09-18

## (undated) RX ORDER — HEPARIN SODIUM 1000 [USP'U]/ML
INJECTION, SOLUTION INTRAVENOUS; SUBCUTANEOUS
Status: DISPENSED
Start: 2024-09-18

## (undated) RX ORDER — FENTANYL CITRATE-0.9 % NACL/PF 10 MCG/ML
PLASTIC BAG, INJECTION (ML) INTRAVENOUS
Status: DISPENSED
Start: 2024-09-18

## (undated) RX ORDER — PROPOFOL 10 MG/ML
INJECTION, EMULSION INTRAVENOUS
Status: DISPENSED
Start: 2024-09-18

## (undated) RX ORDER — VANCOMYCIN HYDROCHLORIDE 1 G/20ML
INJECTION, POWDER, LYOPHILIZED, FOR SOLUTION INTRAVENOUS
Status: DISPENSED
Start: 2024-09-18

## (undated) RX ORDER — FENTANYL CITRATE 50 UG/ML
INJECTION, SOLUTION INTRAMUSCULAR; INTRAVENOUS
Status: DISPENSED
Start: 2024-09-30

## (undated) RX ORDER — POTASSIUM CHLORIDE 29.8 MG/ML
INJECTION INTRAVENOUS
Status: DISPENSED
Start: 2023-10-26

## (undated) RX ORDER — CALCIUM CHLORIDE 100 MG/ML
INJECTION INTRAVENOUS; INTRAVENTRICULAR
Status: DISPENSED
Start: 2024-09-18

## (undated) RX ORDER — NITROGLYCERIN 5 MG/ML
VIAL (ML) INTRAVENOUS
Status: DISPENSED
Start: 2023-10-27

## (undated) RX ORDER — CEFAZOLIN SODIUM 2 G/100ML
INJECTION, SOLUTION INTRAVENOUS
Status: DISPENSED
Start: 2024-05-08

## (undated) RX ORDER — SODIUM CHLORIDE 9 MG/ML
INJECTION, SOLUTION INTRAVENOUS
Status: DISPENSED
Start: 2024-05-08

## (undated) RX ORDER — LIDOCAINE HYDROCHLORIDE 10 MG/ML
INJECTION, SOLUTION EPIDURAL; INFILTRATION; INTRACAUDAL; PERINEURAL
Status: DISPENSED
Start: 2023-03-23

## (undated) RX ORDER — HEPARIN SODIUM 1000 [USP'U]/ML
INJECTION, SOLUTION INTRAVENOUS; SUBCUTANEOUS
Status: DISPENSED
Start: 2023-10-26

## (undated) RX ORDER — LIDOCAINE HYDROCHLORIDE 10 MG/ML
INJECTION, SOLUTION INFILTRATION; PERINEURAL
Status: DISPENSED
Start: 2024-04-22

## (undated) RX ORDER — ONDANSETRON 2 MG/ML
INJECTION INTRAMUSCULAR; INTRAVENOUS
Status: DISPENSED
Start: 2023-10-27

## (undated) RX ORDER — GENTAMICIN 40 MG/ML
INJECTION, SOLUTION INTRAMUSCULAR; INTRAVENOUS
Status: DISPENSED
Start: 2023-09-12

## (undated) RX ORDER — FENTANYL CITRATE 50 UG/ML
INJECTION, SOLUTION INTRAMUSCULAR; INTRAVENOUS
Status: DISPENSED
Start: 2024-09-05

## (undated) RX ORDER — EPHEDRINE SULFATE 50 MG/ML
INJECTION, SOLUTION INTRAMUSCULAR; INTRAVENOUS; SUBCUTANEOUS
Status: DISPENSED
Start: 2024-04-22

## (undated) RX ORDER — LIDOCAINE HYDROCHLORIDE 20 MG/ML
SOLUTION OROPHARYNGEAL
Status: DISPENSED
Start: 2024-09-05

## (undated) RX ORDER — LIDOCAINE HYDROCHLORIDE 20 MG/ML
SOLUTION OROPHARYNGEAL
Status: DISPENSED
Start: 2024-09-19

## (undated) RX ORDER — MUPIROCIN 20 MG/G
OINTMENT TOPICAL
Status: DISPENSED
Start: 2024-09-18

## (undated) RX ORDER — LEVOFLOXACIN 5 MG/ML
INJECTION, SOLUTION INTRAVENOUS
Status: DISPENSED
Start: 2024-09-18

## (undated) RX ORDER — PROPOFOL 10 MG/ML
INJECTION, EMULSION INTRAVENOUS
Status: DISPENSED
Start: 2023-03-23

## (undated) RX ORDER — NITROGLYCERIN 5 MG/ML
VIAL (ML) INTRAVENOUS
Status: DISPENSED
Start: 2023-10-26

## (undated) RX ORDER — LIDOCAINE 40 MG/G
CREAM TOPICAL
Status: DISPENSED
Start: 2024-05-06

## (undated) RX ORDER — PROPOFOL 10 MG/ML
INJECTION, EMULSION INTRAVENOUS
Status: DISPENSED
Start: 2024-04-22

## (undated) RX ORDER — DEXAMETHASONE SODIUM PHOSPHATE 4 MG/ML
INJECTION, SOLUTION INTRA-ARTICULAR; INTRALESIONAL; INTRAMUSCULAR; INTRAVENOUS; SOFT TISSUE
Status: DISPENSED
Start: 2024-09-18

## (undated) RX ORDER — FENTANYL CITRATE 50 UG/ML
INJECTION, SOLUTION INTRAMUSCULAR; INTRAVENOUS
Status: DISPENSED
Start: 2024-05-08

## (undated) RX ORDER — FENTANYL CITRATE 50 UG/ML
INJECTION, SOLUTION INTRAMUSCULAR; INTRAVENOUS
Status: DISPENSED
Start: 2024-04-22

## (undated) RX ORDER — NOREPINEPHRINE BITARTRATE 0.06 MG/ML
INJECTION, SOLUTION INTRAVENOUS
Status: DISPENSED
Start: 2023-10-26

## (undated) RX ORDER — LIDOCAINE HYDROCHLORIDE 10 MG/ML
INJECTION, SOLUTION EPIDURAL; INFILTRATION; INTRACAUDAL; PERINEURAL
Status: DISPENSED
Start: 2023-10-26

## (undated) RX ORDER — HEPARIN SODIUM 1000 [USP'U]/ML
INJECTION, SOLUTION INTRAVENOUS; SUBCUTANEOUS
Status: DISPENSED
Start: 2023-10-27

## (undated) RX ORDER — LIDOCAINE 40 MG/G
CREAM TOPICAL
Status: DISPENSED
Start: 2025-03-18